# Patient Record
Sex: MALE | Race: WHITE | NOT HISPANIC OR LATINO | Employment: OTHER | URBAN - METROPOLITAN AREA
[De-identification: names, ages, dates, MRNs, and addresses within clinical notes are randomized per-mention and may not be internally consistent; named-entity substitution may affect disease eponyms.]

---

## 2017-01-04 DIAGNOSIS — I48.91 ATRIAL FIBRILLATION AND FLUTTER (H): ICD-10-CM

## 2017-01-04 DIAGNOSIS — G25.81 RESTLESS LEGS SYNDROME (RLS): Primary | ICD-10-CM

## 2017-01-04 DIAGNOSIS — I48.92 ATRIAL FIBRILLATION AND FLUTTER (H): ICD-10-CM

## 2017-01-09 ENCOUNTER — TRANSFERRED RECORDS (OUTPATIENT)
Dept: HEALTH INFORMATION MANAGEMENT | Facility: CLINIC | Age: 72
End: 2017-01-09

## 2017-02-07 DIAGNOSIS — G25.81 RESTLESS LEGS SYNDROME (RLS): Primary | ICD-10-CM

## 2017-02-07 RX ORDER — GABAPENTIN 300 MG/1
600 CAPSULE ORAL EVERY EVENING
Qty: 60 CAPSULE | Refills: 5 | Status: SHIPPED | OUTPATIENT
Start: 2017-02-07 | End: 2017-09-13

## 2017-02-10 ENCOUNTER — OFFICE VISIT (OUTPATIENT)
Dept: SLEEP MEDICINE | Facility: CLINIC | Age: 72
End: 2017-02-10
Payer: MEDICARE

## 2017-02-10 VITALS
HEART RATE: 61 BPM | BODY MASS INDEX: 27.55 KG/M2 | DIASTOLIC BLOOD PRESSURE: 54 MMHG | SYSTOLIC BLOOD PRESSURE: 110 MMHG | TEMPERATURE: 97.5 F | WEIGHT: 186 LBS | HEIGHT: 69 IN | OXYGEN SATURATION: 93 %

## 2017-02-10 DIAGNOSIS — G47.33 OSA (OBSTRUCTIVE SLEEP APNEA): Primary | ICD-10-CM

## 2017-02-10 PROCEDURE — 99214 OFFICE O/P EST MOD 30 MIN: CPT | Performed by: INTERNAL MEDICINE

## 2017-02-10 NOTE — PATIENT INSTRUCTIONS

## 2017-02-10 NOTE — MR AVS SNAPSHOT
After Visit Summary   2/10/2017    Efrem Ramos    MRN: [id number]           Patient Information     Date Of Birth          1945        Visit Information        Provider Department      2/10/2017 11:30 AM Ron Pacheco MD [facility] [place]        Today's Diagnoses     MEL (obstructive sleep apnea)    -  1      Care Instructions      Your BMI is Body mass index is 27.87 kg/(m^2).  Weight management is a personal decision.  If you are interested in exploring weight loss strategies, the following discussion covers the approaches that may be successful. Body mass index (BMI) is one way to tell whether you are at a healthy weight, overweight, or obese. It measures your weight in relation to your height.  A BMI of 18.5 to 24.9 is in the healthy range. A person with a BMI of 25 to 29.9 is considered overweight, and someone with a BMI of 30 or greater is considered obese. More than two-thirds of American adults are considered overweight or obese.  Being overweight or obese increases the risk for further weight gain. Excess weight may lead to heart disease and diabetes.  Creating and following plans for healthy eating and physical activity may help you improve your health.  Weight control is part of healthy lifestyle and includes exercise, emotional health, and healthy eating habits. Careful eating habits lifelong are the mainstay of weight control. Though there are significant health benefits from weight loss, long-term weight loss with diet alone may be very difficult to achieve- studies show long-term success with dietary management in less than 10% of people. Attaining a healthy weight may be especially difficult to achieve in those with severe obesity. In some cases, medications, devices and surgical management might be considered.  What can you do?  If you are overweight or obese and are interested in methods for weight loss, you should discuss this with your provider.     Consider  reducing daily calorie intake by 500 calories.     Keep a food journal.     Avoiding skipping meals, consider cutting portions instead.    Diet combined with exercise helps maintain muscle while optimizing fat loss. Strength training is particularly important for building and maintaining muscle mass. Exercise helps reduce stress, increase energy, and improves fitness. Increasing exercise without diet control, however, may not burn enough calories to loose weight.       Start walking three days a week 10-20 minutes at a time    Work towards walking thirty minutes five days a week     Eventually, increase the speed of your walking for 1-2 minutes at time    In addition, we recommend that you review healthy lifestyles and methods for weight loss available through the National Institutes of Health patient information sites:  http://win.niddk.nih.gov/publications/index.htm    And look into health and wellness programs that may be available through your health insurance provider, employer, local community center, or anna club.    Weight management plan: Patient was referred to their PCP to discuss a diet and exercise plan.            Follow-ups after your visit        Follow-up notes from your care team     Return in about 3 months (around 5/10/2017).      Your next 10 appointments already scheduled     May 23, 2017  1:00 PM CDT   Level O with [facility] 2   [facility] ([facility])    [facility]  [address]  [facility] [address]   [phone]            May 23, 2017  2:00 PM CDT   Return Sleep Patient with Ron Pacheco MD   [facility] ([facility])    [address]  [facility] [address]   [phone]            May 31, 2017  1:00 PM CDT   Return Visit with Shae Aldana MD   [facility] ([facility])    [facility]  [address]  [place] [address] [phone]              Who to contact     If you have questions or need follow up information about today's clinic visit or your schedule please contact [facility] directly at [phone].  Normal or non-critical lab and imaging results will be communicated to you by MyChart, letter or phone within 4 business days after the clinic has received the results. If you do not hear from us within 7 days, please contact the clinic through [employer] or phone. If you have a critical or abnormal lab result, we will notify you by phone as soon as possible.  Submit refill requests through [employer] or call your pharmacy and they will forward the refill request to us. Please allow 3 business days for your refill to be completed.          Additional Information About Your Visit        [employer] Information     [employer] gives you secure access to your electronic health record. If you see a primary care provider, you can also send messages to your care team and make appointments. If you have questions, please call your primary care clinic.  If you do not have a primary care provider, please call [phone] and they will assist you.        Care EveryWhere ID     This is your Care EveryWhere ID. This could be used by other organizations to access your [place] medical records  [id number]        Your Vitals Were     Pulse Temperature Height Pulse Oximetry BMI (Body Mass Index)       61 97.5  F (36.4  C) (Oral) 1.74 m (5' 8.5\") 93% 27.87 kg/m2        Blood Pressure from Last 3 Encounters:   02/10/17 110/54   12/14/16 112/66   11/30/16 112/72    Weight from Last 3 Encounters:   02/10/17 84.4 kg (186 lb)   12/14/16 82.2 kg (181 lb 3.2 oz)   11/30/16 83.9 kg (185 lb)              We Performed the Following     Comprehensive DME        Primary Care Provider Office Phone # Fax #    Danny Paige [phone]       [facility] [address]  [facility] [address]        Thank you!     Thank you for choosing [facility]  for your care. Our goal is always to provide you with excellent care. Hearing back from our patients is one way we can continue to improve our services. Please take a few minutes to complete the written survey that you may receive in the mail after your visit with us. Thank you!             Your Updated Medication List - Protect others around you: Learn how to safely use, store and throw away your medicines at www.disposemymeds.org.          This list is accurate as of: 2/10/17 11:59 PM.  Always use your most recent med list.                   Brand Name Dispense Instructions for use    acetaminophen 500 MG tablet    TYLENOL     Take 1,000 mg by mouth every 6 hours as needed for mild pain       apixaban ANTICOAGULANT 5 MG tablet    ELIQUIS    180 tablet    Take 1 tablet (5 mg) by mouth 2 times daily       ASPIRIN PO      Take 81 mg by mouth every morning       B-12 2000 MCG Tabs      Take 1 tablet by mouth every morning       digoxin 125 MCG tablet    LANOXIN    30 tablet    Take 1 tablet (125 mcg) by mouth daily       gabapentin 300 MG capsule    NEURONTIN    60 capsule    Take 2 capsules (600 mg) by mouth every evening       metoprolol 25 MG tablet    LOPRESSOR    30 tablet    Take 0.5 tablets (12.5 mg) by mouth 2 times daily       MIRALAX powder   Generic drug:  polyethylene glycol      Take 17 g by mouth daily       multivitamin Tabs tablet      Take 1 tablet by mouth every morning       predniSONE 10 MG tablet    DELTASONE     Take 5 mg by mouth daily       simvastatin 40 MG tablet    ZOCOR    90 tablet    Take 1 tablet (40 mg) by mouth At Bedtime

## 2017-02-10 NOTE — NURSING NOTE
"Chief Complaint   Patient presents with     Sleep Problem     medication follow up for RLS, discuss restart of cpap machine       Initial /54 mmHg  Pulse 61  Temp(Src) 97.5  F (36.4  C) (Oral)  Ht 1.74 m (5' 8.5\")  Wt 84.369 kg (186 lb)  BMI 27.87 kg/m2  SpO2 93% Estimated body mass index is 27.87 kg/(m^2) as calculated from the following:    Height as of this encounter: 1.74 m (5' 8.5\").    Weight as of this encounter: 84.369 kg (186 lb).  Medication Reconciliation: complete   ESS 7/24  Yudy Felix MA      "

## 2017-02-11 NOTE — PROGRESS NOTES
PROGRESS NOTE       DATE OF SERVICE:  02/10/2017.       CHIEF COMPLAINT:  Follow up for obstructive sleep apnea and restless legs syndrome.      HISTORY OF PRESENT ILLNESS:  Mr. Efrem Ramos was accompanied by his wife.  He was previously diagnosed with severe obstructive sleep apnea.  Polysomnogram from 07/20/2015 showed an apnea-hypopnea index of 43.4 per hour and RDI of 58.8 per hour.  Lowest oxygen saturation was 87.2%.  The patient had a PLM index of 40 per hour with a PLM arousal index of 8 per hour.  The patient has subjective complaints of restless legs syndrome.      He was started on auto-PAP therapy with a pressure range of 5-10 cm of water.  He had a good response initially to CPAP.  He had demonstrated good compliance on his followup on 10/21/2015.  However, patient had a break in therapy last year.  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  He cannot use CPAP any more due to recurrent cough.  He subsequently discontinued CPAP and has some anxiety about restarting it.  He currently uses a full face mask and wonders if a nasal mask interface may be better tolerated.      He was started on gabapentin at a dose of 600 mg for managing his restless legs and excessive periodic limb movements of sleep.  The patient has responded well to gabapentin, which he has continued regularly.      Today, I discussed restarting CPAP therapy for his sleep apnea.  CPAP desensitization techniques were discussed in detail.  We also discussed changing his mask interface to see if he can tolerate therapy better.  I have advised a mask fitting session with a DME and restarting CPAP in the autotitration range at 5-10 cm of water.            Medications:     Current Outpatient Prescriptions   Medication Sig     gabapentin (NEURONTIN) 300 MG capsule Take 2 capsules (600 mg) by mouth every evening     apixaban ANTICOAGULANT (ELIQUIS) 5 MG tablet Take 1 tablet (5 mg) by mouth 2 times daily      predniSONE (DELTASONE) 10 MG tablet Take 5 mg by mouth daily      polyethylene glycol (MIRALAX) powder Take 17 g by mouth daily     metoprolol (LOPRESSOR) 25 MG tablet Take 0.5 tablets (12.5 mg) by mouth 2 times daily     acetaminophen (TYLENOL) 500 MG tablet Take 1,000 mg by mouth every 6 hours as needed for mild pain     simvastatin (ZOCOR) 40 MG tablet Take 1 tablet (40 mg) by mouth At Bedtime     digoxin (LANOXIN) 125 MCG tablet Take 1 tablet (125 mcg) by mouth daily     ASPIRIN PO Take 81 mg by mouth every morning      Cyanocobalamin (B-12) 2000 MCG TABS Take 1 tablet by mouth every morning      multivitamin (OCUVITE) TABS Take 1 tablet by mouth every morning     No current facility-administered medications for this visit.         Allergies   Allergen Reactions     Adhesive Tape Blisters     Lasix [Furosemide] Other (See Comments)     Throat swelling, wheezing     Penicillins Unknown     Sulfa Drugs Difficulty breathing            Past Medical History:     Does not need 02 supplement at night   Past Medical History   Diagnosis Date     Atrial fibrillation (H)      Cancer (H) vocal cord     Carpal tunnel syndrome      abstracted 7/3/02.     CVA (cerebral infarction) 5/5/2015     Demyelinating disease of central nervous system, unspecified (H)      abstracted 7/3/02.     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     Other and unspecified noninfectious gastroenteritis and colitis(558.9)      abstracted 7/3/02.     Pneumonia 8/17/2016     Redundant colon      needs CT colonography     Shingles      SKIN DISORDERS NEC 3/15/2005     Sleep apnea              Past Surgical History:    No h/o  upper airway surgery  Past Surgical History   Procedure Laterality Date     C nonspecific procedure  as a child     T & A. abstracted 7/3/02.     C nonspecific  "procedure  early     CTR. abstracted 7/3/02.     Orthopedic surgery       Head & neck surgery       Biopsy  brain 2002     Thoracoscopic wedge resection lung Right 2016     Procedure: THORACOSCOPIC WEDGE RESECTION LUNG;  Surgeon: Abdelrahman Noriega MD;  Location:  OR            Physical Examination:   /54  Pulse 61  Temp 97.5  F (36.4  C) (Oral)  Ht 1.74 m (5' 8.5\")  Wt 84.4 kg (186 lb)  SpO2 93%  BMI 27.87 kg/m2     DIAGNOSES:     1.  Severe obstructive sleep apnea.   2.  Restless leg syndrome.     Patient has severe obstructive sleep apnea but has been unable to use CPAP due to intolerance of mask. A mask refitting has been requested through DME. He was counseled in detail regarding consequences of untreated severe sleep apnea. He was also counseled regarding CPAP desensitization techniques.      TREATMENT PLAN:   1.  Continue auto PAP therapy.    2.  Mask fitting through DME.   3.  Continue gabapentin at 600 mg daily.   4.  Follow up in a month.      I spent a total of 25 minutes with this patient, with more than 50% spent in counseling regarding sleep apnea.         MEGAN HERNANDEZ MD             D: 02/10/2017 16:53   T: 02/10/2017 18:18   MT: SUSHILA      Name:     PHANI AVITIA   MRN:      [id number]        Account:      [id number]   :      1945           Visit Date:   02/10/2017      Document: [id number]       cc: Danny Paige MD   "

## 2017-03-01 ENCOUNTER — TELEPHONE (OUTPATIENT)
Dept: SLEEP MEDICINE | Facility: CLINIC | Age: 72
End: 2017-03-01

## 2017-03-09 ENCOUNTER — ALLIED HEALTH/NURSE VISIT (OUTPATIENT)
Dept: FAMILY MEDICINE | Facility: CLINIC | Age: 72
End: 2017-03-09
Payer: MEDICARE

## 2017-03-09 VITALS — DIASTOLIC BLOOD PRESSURE: 68 MMHG | SYSTOLIC BLOOD PRESSURE: 110 MMHG

## 2017-03-09 DIAGNOSIS — Z01.30 BP CHECK: Primary | ICD-10-CM

## 2017-03-09 PROCEDURE — 99207 ZZC NO CHARGE NURSE ONLY: CPT | Performed by: INTERNAL MEDICINE

## 2017-03-09 NOTE — MR AVS SNAPSHOT
After Visit Summary   3/9/2017    Efrem Ramos    MRN: [id number]           Patient Information     Date Of Birth          1945        Visit Information        Provider Department      3/9/2017 2:04 PM Danny Paige MD [facility]        Today's Diagnoses     BP check    -  1       Follow-ups after your visit        Your next 10 appointments already scheduled     May 23, 2017  1:00 PM CDT   Level O with  INFUSION CHAIR 2   [facility] ([facility])    [facility]  [address]  [facility] [address]   [phone]            May 23, 2017  2:00 PM CDT   Return Sleep Patient with Ron Pacheco MD   [facility] ([facility])    [address]  [facility] [address]   [phone]            May 31, 2017  1:00 PM CDT   Return Visit with Shae Aldana MD   [facility] ([facility])    [facility]  [address]  [facility] [address]   [phone]              Who to contact     If you have questions or need follow up information about today's clinic visit or your schedule please contact [facility] directly at [phone].  Normal or non-critical lab and imaging results will be communicated to you by MyChart, letter or phone within 4 business days after the clinic has received the results. If you do not hear from us within 7 days, please contact the clinic through [employer] or phone. If you have a critical or abnormal lab result, we will notify you by phone as soon as possible.  Submit refill requests through [employer] or call your pharmacy and they will forward the refill request to us. Please allow 3 business days for your refill to be completed.          Additional Information About Your Visit        [employer] Information     [employer] gives you secure access to your electronic health  record. If you see a primary care provider, you can also send messages to your care team and make appointments. If you have questions, please call your primary care clinic.  If you do not have a primary care provider, please call [phone] and they will assist you.        Care EveryWhere ID     This is your Care EveryWhere ID. This could be used by other organizations to access your [place] medical records  [id number]         Blood Pressure from Last 3 Encounters:   03/09/17 110/68   02/10/17 110/54   12/14/16 112/66    Weight from Last 3 Encounters:   02/10/17 186 lb (84.4 kg)   12/14/16 181 lb 3.2 oz (82.2 kg)   11/30/16 185 lb (83.9 kg)              Today, you had the following     No orders found for display       Primary Care Provider Office Phone # Fax #    Danny Paige [phone]       [facility] [address]  [facility] [address]        Thank you!     Thank you for choosing [facility]  for your care. Our goal is always to provide you with excellent care. Hearing back from our patients is one way we can continue to improve our services. Please take a few minutes to complete the written survey that you may receive in the mail after your visit with us. Thank you!             Your Updated Medication List - Protect others around you: Learn how to safely use, store and throw away your medicines at www.disposemymeds.org.          This list is accurate as of: 3/9/17  2:06 PM.  Always use your most recent med list.                   Brand Name Dispense Instructions for use    acetaminophen 500 MG tablet    TYLENOL     Take 1,000 mg by mouth every 6 hours as needed for mild pain       apixaban ANTICOAGULANT 5 MG tablet    ELIQUIS    180 tablet    Take 1 tablet (5 mg) by mouth 2 times daily       ASPIRIN PO      Take 81 mg by mouth every morning       B-12 2000 MCG Tabs      Take 1 tablet by mouth every morning       digoxin 125 MCG tablet    LANOXIN     30 tablet    Take 1 tablet (125 mcg) by mouth daily       gabapentin 300 MG capsule    NEURONTIN    60 capsule    Take 2 capsules (600 mg) by mouth every evening       metoprolol 25 MG tablet    LOPRESSOR    30 tablet    Take 0.5 tablets (12.5 mg) by mouth 2 times daily       MIRALAX powder   Generic drug:  polyethylene glycol      Take 17 g by mouth daily       multivitamin Tabs tablet      Take 1 tablet by mouth every morning       predniSONE 10 MG tablet    DELTASONE     Take 5 mg by mouth daily       simvastatin 40 MG tablet    ZOCOR    90 tablet    Take 1 tablet (40 mg) by mouth At Bedtime

## 2017-03-09 NOTE — PROGRESS NOTES
Efrem Ramos is enrolled/participating in the retail pharmacy Blood Pressure Goals Achievement Program (BPGAP).  Efrem Ramos was evaluated at [facility] on March 9, 2017 at which time his blood pressure was:    BP Readings from Last 3 Encounters:   03/09/17 110/68   02/10/17 110/54   12/14/16 112/66     Reviewed lifestyle modifications for blood pressure control and reduction: including making healthy food choices, managing weight, getting regular exercise, smoking cessation, reducing alcohol consumption, monitoring blood pressure regularly.     Efrem Ramos is not experiencing symptoms.    Follow-Up: BP is at goal of < 150/90 mmHg (patient 60+ years of age without diabetes).  Recommended follow-up at pharmacy in 6 months.     Completed by: Analisa Le, PharmD, [facility]     [facility]  [phone]

## 2017-04-10 ENCOUNTER — TRANSFERRED RECORDS (OUTPATIENT)
Dept: HEALTH INFORMATION MANAGEMENT | Facility: CLINIC | Age: 72
End: 2017-04-10

## 2017-05-23 ENCOUNTER — HOSPITAL ENCOUNTER (OUTPATIENT)
Facility: CLINIC | Age: 72
Setting detail: SPECIMEN
Discharge: HOME | End: 2017-05-23
Attending: INTERNAL MEDICINE | Admitting: INTERNAL MEDICINE
Payer: MEDICARE

## 2017-05-23 ENCOUNTER — ONCOLOGY VISIT (OUTPATIENT)
Dept: ONCOLOGY | Facility: CLINIC | Age: 72
End: 2017-05-23
Attending: INTERNAL MEDICINE
Payer: MEDICARE

## 2017-05-23 DIAGNOSIS — D47.2 MGUS (MONOCLONAL GAMMOPATHY OF UNKNOWN SIGNIFICANCE): ICD-10-CM

## 2017-05-23 LAB
ALBUMIN SERPL-MCNC: 3.4 G/DL (ref 3.4–5)
ALP SERPL-CCNC: 90 U/L (ref 40–150)
ALT SERPL W P-5'-P-CCNC: 14 U/L (ref 0–70)
ANION GAP SERPL CALCULATED.3IONS-SCNC: 11 MMOL/L (ref 3–14)
AST SERPL W P-5'-P-CCNC: 12 U/L (ref 0–45)
BASOPHILS # BLD AUTO: 0.1 10E9/L (ref 0–0.2)
BASOPHILS NFR BLD AUTO: 0.6 %
BILIRUB SERPL-MCNC: 0.9 MG/DL (ref 0.2–1.3)
BUN SERPL-MCNC: 7 MG/DL (ref 7–30)
CALCIUM SERPL-MCNC: 8.7 MG/DL (ref 8.5–10.1)
CHLORIDE SERPL-SCNC: 107 MMOL/L (ref 94–109)
CO2 SERPL-SCNC: 22 MMOL/L (ref 20–32)
CREAT SERPL-MCNC: 0.82 MG/DL (ref 0.66–1.25)
DIFFERENTIAL METHOD BLD: ABNORMAL
EOSINOPHIL # BLD AUTO: 0.2 10E9/L (ref 0–0.7)
EOSINOPHIL NFR BLD AUTO: 1.7 %
ERYTHROCYTE [DISTWIDTH] IN BLOOD BY AUTOMATED COUNT: 14.3 % (ref 10–15)
GFR SERPL CREATININE-BSD FRML MDRD: NORMAL ML/MIN/1.7M2
GLUCOSE SERPL-MCNC: 94 MG/DL (ref 70–99)
HCT VFR BLD AUTO: 39.6 % (ref 40–53)
HGB BLD-MCNC: 13.5 G/DL (ref 13.3–17.7)
IMM GRANULOCYTES # BLD: 0 10E9/L (ref 0–0.4)
IMM GRANULOCYTES NFR BLD: 0.4 %
LYMPHOCYTES # BLD AUTO: 1.8 10E9/L (ref 0.8–5.3)
LYMPHOCYTES NFR BLD AUTO: 17.3 %
MCH RBC QN AUTO: 32.7 PG (ref 26.5–33)
MCHC RBC AUTO-ENTMCNC: 34.1 G/DL (ref 31.5–36.5)
MCV RBC AUTO: 96 FL (ref 78–100)
MONOCYTES # BLD AUTO: 1 10E9/L (ref 0–1.3)
MONOCYTES NFR BLD AUTO: 9.6 %
NEUTROPHILS # BLD AUTO: 7.4 10E9/L (ref 1.6–8.3)
NEUTROPHILS NFR BLD AUTO: 70.4 %
NRBC # BLD AUTO: 0 10*3/UL
NRBC BLD AUTO-RTO: 0 /100
PLATELET # BLD AUTO: 296 10E9/L (ref 150–450)
POTASSIUM SERPL-SCNC: 3.5 MMOL/L (ref 3.4–5.3)
PROT SERPL-MCNC: 7.3 G/DL (ref 6.8–8.8)
RBC # BLD AUTO: 4.13 10E12/L (ref 4.4–5.9)
SODIUM SERPL-SCNC: 140 MMOL/L (ref 133–144)
WBC # BLD AUTO: 10.6 10E9/L (ref 4–11)

## 2017-05-23 PROCEDURE — 80053 COMPREHEN METABOLIC PANEL: CPT | Performed by: INTERNAL MEDICINE

## 2017-05-23 PROCEDURE — 00000402 ZZHCL STATISTIC TOTAL PROTEIN: Performed by: INTERNAL MEDICINE

## 2017-05-23 PROCEDURE — 36415 COLL VENOUS BLD VENIPUNCTURE: CPT

## 2017-05-23 PROCEDURE — 82784 ASSAY IGA/IGD/IGG/IGM EACH: CPT | Performed by: INTERNAL MEDICINE

## 2017-05-23 PROCEDURE — 84165 PROTEIN E-PHORESIS SERUM: CPT | Performed by: INTERNAL MEDICINE

## 2017-05-23 PROCEDURE — 86334 IMMUNOFIX E-PHORESIS SERUM: CPT | Performed by: INTERNAL MEDICINE

## 2017-05-23 PROCEDURE — 83883 ASSAY NEPHELOMETRY NOT SPEC: CPT | Performed by: INTERNAL MEDICINE

## 2017-05-23 PROCEDURE — 85025 COMPLETE CBC W/AUTO DIFF WBC: CPT | Performed by: INTERNAL MEDICINE

## 2017-05-23 NOTE — MR AVS SNAPSHOT
After Visit Summary   5/23/2017    Efrem Ramos    MRN: [id number]           Patient Information     Date Of Birth          1945        Visit Information        Provider Department      5/23/2017 1:00 PM Nurse, Tram Oncology [facility]        Today's Diagnoses     MGUS (monoclonal gammopathy of unknown significance)           Follow-ups after your visit        Your next 10 appointments already scheduled     May 31, 2017  1:00 PM CDT   Return Visit with Shae Aldana MD   [facility] ([facility])    [facility]  [address]  [facility] [address] [phone]            Ferdinand 15, 2017  1:40 PM CDT   LAB with DANIELLE LAB   [facility] PHYSICIANS HEART AT [place] ([facility])    [address]  [facility] [address] [phone]           Patient must bring picture ID.  Patient should be prepared to give a urine specimen  Please do not eat 10-12 hours before your appointment if you are coming in fasting for labs on lipids, cholesterol, or glucose (sugar).  Pregnant women should follow their Care Team instructions. Water with medications is okay. Do not drink coffee or other fluids.   If you have concerns about taking  your medications, please ask at office or if scheduling via S[employer]ed [employer], send a message by clicking on Secure Messaging, Message Your Care Team.            Ferdinand 15, 2017  2:00 PM CDT   Ech Complete with SHCVECHR2   [facility] CV Echocardiography (Cardiovascular Imaging at [facility])    [address]  [facility] [address] [phone]           1.  Please bring or wear a comfortable two-piece outfit. 2.  You may eat, drink and take your normal medicines. 3.  For any questions that cannot be answered, please contact the ordering physician            Jun 16, 2017 12:45 PM CDT   Return Visit with Bella Chavis MD   [facility]  PHYSICIANS HEART AT [place] ([facility])    [address]  [place] [address] [phone]              Who to contact     If you have questions or need follow up information about today's clinic visit or your schedule please contact [facility] directly at [phone].  Normal or non-critical lab and imaging results will be communicated to you by MyChart, letter or phone within 4 business days after the clinic has received the results. If you do not hear from us within 7 days, please contact the clinic through Addeparhart or phone. If you have a critical or abnormal lab result, we will notify you by phone as soon as possible.  Submit refill requests through [employer] or call your pharmacy and they will forward the refill request to us. Please allow 3 business days for your refill to be completed.          Additional Information About Your Visit        Addeparhart Information     [employer] gives you secure access to your electronic health record. If you see a primary care provider, you can also send messages to your care team and make appointments. If you have questions, please call your primary care clinic.  If you do not have a primary care provider, please call [phone] and they will assist you.        Care EveryWhere ID     This is your Care EveryWhere ID. This could be used by other organizations to access your [place] medical records  [id number]         Blood Pressure from Last 3 Encounters:   03/09/17 110/68   02/10/17 110/54   12/14/16 112/66    Weight from Last 3 Encounters:   02/10/17 84.4 kg (186 lb)   12/14/16 82.2 kg (181 lb 3.2 oz)   11/30/16 83.9 kg (185 lb)              We Performed the Following     CBC with platelets differential     Comprehensive metabolic panel     Kappa and lambda light chain     Protein electrophoresis     Protein Immunofixation Serum        Primary Care Provider Office Phone # Fax #    Danny Paige [phone]        [facility] [address] [facility] [address]  [facility] [address]        Thank you!     Thank you for choosing [facility]  for your care. Our goal is always to provide you with excellent care. Hearing back from our patients is one way we can continue to improve our services. Please take a few minutes to complete the written survey that you may receive in the mail after your visit with us. Thank you!             Your Updated Medication List - Protect others around you: Learn how to safely use, store and throw away your medicines at www.disposemymeds.org.          This list is accurate as of: 5/23/17  1:15 PM.  Always use your most recent med list.                   Brand Name Dispense Instructions for use    acetaminophen 500 MG tablet    TYLENOL     Take 1,000 mg by mouth every 6 hours as needed for mild pain       apixaban ANTICOAGULANT 5 MG tablet    ELIQUIS    180 tablet    Take 1 tablet (5 mg) by mouth 2 times daily       ASPIRIN PO      Take 81 mg by mouth every morning       B-12 2000 MCG Tabs      Take 1 tablet by mouth every morning       digoxin 125 MCG tablet    LANOXIN    30 tablet    Take 1 tablet (125 mcg) by mouth daily       gabapentin 300 MG capsule    NEURONTIN    60 capsule    Take 2 capsules (600 mg) by mouth every evening       metoprolol 25 MG tablet    LOPRESSOR    30 tablet    Take 0.5 tablets (12.5 mg) by mouth 2 times daily       MIRALAX powder   Generic drug:  polyethylene glycol      Take 17 g by mouth daily       multivitamin Tabs tablet      Take 1 tablet by mouth every morning       predniSONE 10 MG tablet    DELTASONE     Take 5 mg by mouth daily       simvastatin 40 MG tablet    ZOCOR    90 tablet    Take 1 tablet (40 mg) by mouth At Bedtime

## 2017-05-23 NOTE — PROGRESS NOTES
Medical Assistant Note:  Efrem Ramos presents today for lab visit.    Patient seen by provider today: No.   present during visit today: Not Applicable.    Concerns: No Concerns.    Procedure:  Lab draw site: LAC, Needle type: BF, Gauge: 21 g gauze and coban applied.    Post Assessment:  Labs drawn without difficulty: Yes.    Discharge Plan:  Departure Mode: Ambulatory.    Face to Face Time: 5.    Dotty Batista MA

## 2017-05-24 LAB
ALBUMIN SERPL ELPH-MCNC: 3.6 G/DL (ref 3.7–5.1)
ALPHA1 GLOB SERPL ELPH-MCNC: 0.4 G/DL (ref 0.2–0.4)
ALPHA2 GLOB SERPL ELPH-MCNC: 0.8 G/DL (ref 0.5–0.9)
B-GLOBULIN SERPL ELPH-MCNC: 0.8 G/DL (ref 0.6–1)
GAMMA GLOB SERPL ELPH-MCNC: 1.5 G/DL (ref 0.7–1.6)
IGA SERPL-MCNC: 328 MG/DL (ref 70–380)
IGG SERPL-MCNC: 1370 MG/DL (ref 695–1620)
IGM SERPL-MCNC: 193 MG/DL (ref 60–265)
IMMUNOFIXATION ELP: NORMAL
KAPPA LC UR-MCNC: 5.51 MG/DL (ref 0.33–1.94)
KAPPA LC/LAMBDA SER: 1.69 {RATIO} (ref 0.26–1.65)
LAMBDA LC SERPL-MCNC: 3.26 MG/DL (ref 0.57–2.63)
M PROTEIN SERPL ELPH-MCNC: 0.4 G/DL
PROT PATTERN SERPL ELPH-IMP: ABNORMAL

## 2017-05-31 ENCOUNTER — ONCOLOGY VISIT (OUTPATIENT)
Dept: ONCOLOGY | Facility: CLINIC | Age: 72
End: 2017-05-31
Attending: INTERNAL MEDICINE
Payer: MEDICARE

## 2017-05-31 ENCOUNTER — HOSPITAL ENCOUNTER (OUTPATIENT)
Facility: CLINIC | Age: 72
Setting detail: SPECIMEN
Discharge: HOME | End: 2017-05-31
Attending: INTERNAL MEDICINE | Admitting: INTERNAL MEDICINE
Payer: MEDICARE

## 2017-05-31 VITALS
WEIGHT: 182.8 LBS | HEART RATE: 64 BPM | SYSTOLIC BLOOD PRESSURE: 98 MMHG | TEMPERATURE: 98.3 F | RESPIRATION RATE: 20 BRPM | OXYGEN SATURATION: 97 % | DIASTOLIC BLOOD PRESSURE: 61 MMHG | BODY MASS INDEX: 27.39 KG/M2

## 2017-05-31 DIAGNOSIS — D47.2 MGUS (MONOCLONAL GAMMOPATHY OF UNKNOWN SIGNIFICANCE): Primary | ICD-10-CM

## 2017-05-31 PROCEDURE — 99211 OFF/OP EST MAY X REQ PHY/QHP: CPT

## 2017-05-31 PROCEDURE — 36415 COLL VENOUS BLD VENIPUNCTURE: CPT

## 2017-05-31 PROCEDURE — 99214 OFFICE O/P EST MOD 30 MIN: CPT | Performed by: INTERNAL MEDICINE

## 2017-05-31 PROCEDURE — 82607 VITAMIN B-12: CPT | Performed by: INTERNAL MEDICINE

## 2017-05-31 ASSESSMENT — PAIN SCALES - GENERAL: PAINLEVEL: NO PAIN (0)

## 2017-05-31 NOTE — MR AVS SNAPSHOT
After Visit Summary   5/31/2017    Efrem Ramos    MRN: [id number]           Patient Information     Date Of Birth          1945        Visit Information        Provider Department      5/31/2017 1:00 PM Shae Aldana MD [facility]        Today's Diagnoses     MGUS (monoclonal gammopathy of unknown significance)    -  1      Care Instructions    1. Labs before next visit- CBC diff, CMP, IFXN, SPEP, light chain, B12  2.  RTC MD 4 months  3- B12 level today  4- Change vitamin B12 2000 mcg once a week  5- Schedule an appointment with Dr. Paige for chronic diarrhea  6- Schedule a bone marrow biopsy          Follow-ups after your visit        Your next 10 appointments already scheduled     Jun 08, 2017   Procedure with Jamie Gonzales MD   [facility] ([facility])    [address] [place] [address] [phone]           [facility] is located at [address] [place] [address]            Ferdinand 15, 2017  1:40 PM CDT   LAB with DANIELLE LAB   [facility] PHYSICIANS HEART AT [place] (P PSA Clinics)    [address]  [facility] [address] [phone]           Patient must bring picture ID.  Patient should be prepared to give a urine specimen  Please do not eat 10-12 hours before your appointment if you are coming in fasting for labs on lipids, cholesterol, or glucose (sugar).  Pregnant women should follow their Care Team instructions. Water with medications is okay. Do not drink coffee or other fluids.   If you have concerns about taking  your medications, please ask at office or if scheduling via [employer], send a message by clicking on Secure Messaging, Message Your Care Team.            Ferdinand 15, 2017  2:00 PM CDT   Ech Complete with SHCVECHR2   [facility] CV Echocardiography (Cardiovascular Imaging at [facility])    [address]  [facility] [address]    [phone]           1.  Please bring or wear a comfortable two-piece outfit. 2.  You may eat, drink and take your normal medicines. 3.  For any questions that cannot be answered, please contact the ordering physician            Jun 16, 2017 12:45 PM CDT   Return Visit with Bella Chavis MD   [facility] PHYSICIANS [facility] AT [place] ([facility] Clin[facility])    [address]  [facility] [address]   [phone]            Sep 20, 2017  1:00 PM CDT   Return Visit with  Oncology Nurse   [facility] ([facility])    [facility]  [address]  [facility] [address] [phone]            Sep 27, 2017  1:00 PM CDT   Return Visit with Shae Aldana MD   [facility] ([facility])    [facility]  [address]  [facility] [address] [phone]              Who to contact     If you have questions or need follow up information about today's clinic visit or your schedule please contact [facility] directly at [phone].  Normal or non-critical lab and imaging results will be communicated to you by Ambient [employer] Systemshart, letter or phone within 4 business days after the clinic has received the results. If you do not hear from us within 7 days, please contact the clinic through EzyInsightst or phone. If you have a critical or abnormal lab result, we will notify you by phone as soon as possible.  Submit refill requests through [employer] or call your pharmacy and they will forward the refill request to us. Please allow 3 business days for your refill to be completed.          Additional Information About Your Visit        [employer] Information     [employer] gives you secure access to your electronic health record. If you see a primary care provider, you can also send messages to your care team and make appointments. If you have questions, please call your primary care clinic.  If  you do not have a primary care provider, please call [phone] and they will assist you.        Care EveryWhere ID     This is your Care EveryWhere ID. This could be used by other organizations to access your [place] medical records  [id number]        Your Vitals Were     Pulse Temperature Respirations Pulse Oximetry BMI (Body Mass Index)       64 98.3  F (36.8  C) (Oral) 20 97% 27.39 kg/m2        Blood Pressure from Last 3 Encounters:   No data found for BP    Weight from Last 3 Encounters:   No data found for Wt              Today, you had the following     No orders found for display         Today's Medication Changes          These changes are accurate as of: 5/31/17 11:59 PM.  If you have any questions, ask your nurse or doctor.               Stop taking these medicines if you haven't already. Please contact your care team if you have questions.     predniSONE 10 MG tablet   Commonly known as:  DELTASONE                    Primary Care Provider Office Phone # Fax #    Danny Paige [phone]       [facility] [address]  [facility] [address]        Thank you!     Thank you for choosing [facility]  for your care. Our goal is always to provide you with excellent care. Hearing back from our patients is one way we can continue to improve our services. Please take a few minutes to complete the written survey that you may receive in the mail after your visit with us. Thank you!             Your Updated Medication List - Protect others around you: Learn how to safely use, store and throw away your medicines at www.disposemymeds.org.          This list is accurate as of: 5/31/17 11:59 PM.  Always use your most recent med list.                   Brand Name Dispense Instructions for use    acetaminophen 500 MG tablet    TYLENOL     Take 1,000 mg by mouth every 6 hours as needed for mild pain       apixaban ANTICOAGULANT 5 MG tablet    ELIQUIS    180  tablet    Take 1 tablet (5 mg) by mouth 2 times daily       ASPIRIN PO      Take 81 mg by mouth every morning       B-12 2000 MCG Tabs      Take 1 tablet by mouth every morning       digoxin 125 MCG tablet    LANOXIN    30 tablet    Take 1 tablet (125 mcg) by mouth daily       gabapentin 300 MG capsule    NEURONTIN    60 capsule    Take 2 capsules (600 mg) by mouth every evening       metoprolol 25 MG tablet    LOPRESSOR    30 tablet    Take 0.5 tablets (12.5 mg) by mouth 2 times daily       MIRALAX powder   Generic drug:  polyethylene glycol      Take 17 g by mouth daily       multivitamin Tabs tablet      Take 1 tablet by mouth every morning       simvastatin 40 MG tablet    ZOCOR    90 tablet    Take 1 tablet (40 mg) by mouth At Bedtime

## 2017-05-31 NOTE — PROGRESS NOTES
"Oncology Rooming Note    May 31, 2017 1:12 PM   Efrem Ramos is a 71 year old male who presents for:    Chief Complaint   Patient presents with     Oncology Clinic Visit     Initial Vitals: BP 98/61 (BP Location: Left arm, Patient Position: Chair, Cuff Size: Adult Regular)  Pulse 64  Temp 98.3  F (36.8  C) (Oral)  Resp 20  Wt 82.9 kg (182 lb 12.8 oz)  SpO2 97%  BMI 27.39 kg/m2 Estimated body mass index is 27.39 kg/(m^2) as calculated from the following:    Height as of 2/10/17: 1.74 m (5' 8.5\").    Weight as of this encounter: 82.9 kg (182 lb 12.8 oz). Body surface area is 2 meters squared.  No Pain (0) Comment: Data Unavailable   No LMP for male patient.  Allergies reviewed: Yes  Medications reviewed: Yes    Medications: Medication refills not needed today.  Pharmacy name entered into Osmopure:    [facility] [address] [place] [address] [address] AT [facility] & [address] [place] [address] PHARMACY [facility] [address]    Clinical concerns: None     5 minutes for nursing intake (face to face time)     Opal Samuel CMA        Medical Assistant Note:  Efrem Ramos presents today for blood draw.    Patient seen by provider today: Yes   present during visit today: Not Applicable.    Concerns: No Concerns.    Procedure:  Lab draw site: LAC, Needle type: BF, Gauge: 21.    Post Assessment:  Labs drawn without difficulty: Yes.    Discharge Plan:  Departure Mode: Ambulatory.    Face to Face Time: 5 minutes.    Opal Samuel MA        DISCHARGE PLAN:  Next appointments: See patient instruction section, blood drawn by Opal.  Departure Mode: Ambulatory  Accompanied by: wife  5 minutes for nursing discharge (face to face time)   Opal Samuel CMA      1. Labs before next visit- CBC diff, CMP, IFXN, SPEP, light chain, B12  2.  RTC MD 4 months  3- B12 level today  4- Change vitamin B12 2000 mcg once a week  5- Schedule an appointment with Dr." Idelkope for chronic diarrhea  6- Schedule a bone marrow biopsy--6-8-17 9/20/2017 Wed  1:00 PM  1:00 P 15 [facility] [[id number]]  ONCOLOGY NURSE [[id number]] RETURN [657] Labs, CBC diff, CMP, IFXN, SPEP, light chain, B12       9/27/2017 Wed  1:00 PM  1:00 P 15 [facility] [[id number]] CASSIDY MAYO [[id number]] RETURN [897] MGUS

## 2017-05-31 NOTE — PROGRESS NOTES
[facility] PHYSICIANS  HEMATOLOGY ONCOLOGY    HEMATOLOGY FOLLOWUP NOTE      DIAGNOSES:   1.  Monoclonal gammopathy of unknown significance.   2.  Anemia.   3.  History of Vocal cord cancer.      TREATMENT:     1.  Efrem Ramos is status post surgery for vocal cord cancer.  He is following up with ENT regularly.   2.  Vitamin B12 1000 mcg intramuscular today.   3.  Vitamin B12 2000 mcg p.o. daily.      SUBJECTIVE:  Patient comes in for followup today. He has been having issues with diarrhea. He otherwise loja not have any other alarming symptoms. No bone pains.      REVIEW OF SYSTEMS:  A complete review of systems was performed and found to be negative other than pertinent positives mentioned in History of Present Illness.     Past medical, social histories reviewed.    Meds- Reviewed.     PHYSICAL EXAMINATION:   VITAL SIGNS:  BP 98/61 (BP Location: Left arm, Patient Position: Chair, Cuff Size: Adult Regular)  Pulse 64  Temp 98.3  F (36.8  C) (Oral)  Resp 20  Wt 82.9 kg (182 lb 12.8 oz)  SpO2 97%  BMI 27.39 kg/m2  GENERAL:  Sitting comfortably.   HEENT:  Pupils equal.  Oral mucosa normal.   NECK:  No supraclavicular or cervical lymphadenopathy.   HEART:  S1, S2, regular.   LUNGS:  Bilaterally clear to auscultation.   GASTROINTESTINAL:  Abdomen is oft, nontender.  No hepatosplenomegaly.   SKIN:  No rash.   EXTREMITIES:  No dependent edema.   NEUROLOGIC:  Alert, awake.      DATA:  Recent Labs   Lab Test  05/23/17   1311  11/21/16   1314   NA  140  141   POTASSIUM  3.5  4.2   CHLORIDE  107  108   CO2  22  27   ANIONGAP  11  6   BUN  7  9   CR  0.82  0.77   GLC  94  87   ULISSES  8.7  8.9     Recent Labs   Lab Test  05/23/17   1311  11/21/16   1314  08/24/16   1539   WBC  10.6  15.2*  8.4   HGB  13.5  12.7*  12.5*   PLT  296  314  293   MCV  96  98  95   NEUTROPHIL  70.4  82.9  81.8     Recent Labs   Lab Test  05/23/17   1311  11/21/16   1314  11/09/16   1246  08/24/16   1539   09/16/13   1350  08/20/13    1509   BILITOTAL  0.9  0.6   --   0.8   < >  0.9  0.7   ALKPHOS  90  66   --   73   < >  90  109   ALT  14  25  <5*  42   < >  28  28   AST  12  15   --   22   < >  30  28   ALBUMIN  3.4  3.1*   --   3.0*   < >  4.1  3.8   LDH   --    --    --    --    --   453  434    < > = values in this interval not displayed.   Results for PHANI AVITIA (MRN [id number]) as of 5/31/2017 13:17   Ref. Range 8/24/2016 15:39 11/21/2016 13:14 5/23/2017 13:11   Gamma Fraction Latest Ref Range: 0.7 - 1.6 g/dL 1.2 1.2 1.5   IGA Latest Ref Range: 70 - 380 mg/dL 351 314 328   IGG Latest Ref Range: 695 - 1620 mg/dL 1150 1030 1370   IGM Latest Ref Range: 60 - 265 mg/dL 172 205 193   Immunofixation ELP Unknown (Note)... (Note)... (Note)...   Kappa Free Lt Chain Latest Ref Range: 0.33 - 1.94 mg/dL 2.90 (H) 2.25 (H) 5.51 (H)   Kappa Lambda Ratio Latest Ref Range: 0.26 - 1.65  1.58 1.30 1.69 (H)   Lambda Free Lt Chain Latest Ref Range: 0.57 - 2.63 mg/dL 1.84 1.73 3.26 (H)   Monoclonal Peak Latest Ref Range: 0.0 g/dL 0.3 (H) 0.3 (H) 0.4 (H)     ASSESSMENT:   1- This is a 69-year-old gentleman with monoclonal gammopathy of unknown significance.  Given abnormal light chain ratio a bone marrow biopsy was recommended, patient declined in the past. He is being monitored with periodic exam and labs.   - His labs show further abnormality of light chains. No other evidence of end organ damage. I discussed that a bone marrow biopsy would be reasonable to assess for possibility of multiple myeloma. He agrees to it.   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     PLAN:     1. Labs before next visit- CBC diff, CMP, IFXN, SPEP, light chain, B12  2.  RTC MD 4 months  3- B12 level today  4- Change vitamin B12 2000 mcg once a week  5- Schedule an appointment with Dr. Paige for chronic diarrhea  6- Schedule a bone marrow biopsy  CASSIDY MAYO MD    5/31/2017    Total time spent 30 minutes, more than 50% of which were  spent in counselling and coordination of care.       CC: Danny Paige MD

## 2017-06-01 LAB — VIT B12 SERPL-MCNC: 2484 PG/ML (ref 193–986)

## 2017-06-07 ENCOUNTER — TELEPHONE (OUTPATIENT)
Dept: ONCOLOGY | Facility: CLINIC | Age: 72
End: 2017-06-07

## 2017-06-07 NOTE — TELEPHONE ENCOUNTER
Received positive distress screen regarding patient's concern for ability to eat.  Called and spoke with patient's wife as patient was unavailable.  Patient's wife states patient eats 1 meal per day and snacks throughout the day which he has done for years.  Encouraged wife if patient desires to discuss nutrition concerns to make appointment with RD when he returns to clinic for follow up appointment with MD.  Patient's wife verbalized understanding of plan.    Angela Duque, RD, LD  Clinical Dietitian  [facility]  [phone] (direct)

## 2017-06-08 ENCOUNTER — HOSPITAL ENCOUNTER (OUTPATIENT)
Facility: CLINIC | Age: 72
Discharge: HOME | End: 2017-06-08
Attending: PATHOLOGY | Admitting: PATHOLOGY
Payer: MEDICARE

## 2017-06-08 VITALS
DIASTOLIC BLOOD PRESSURE: 74 MMHG | SYSTOLIC BLOOD PRESSURE: 120 MMHG | HEIGHT: 68 IN | OXYGEN SATURATION: 94 % | RESPIRATION RATE: 13 BRPM

## 2017-06-08 DIAGNOSIS — D47.2 MGUS (MONOCLONAL GAMMOPATHY OF UNKNOWN SIGNIFICANCE): ICD-10-CM

## 2017-06-08 LAB
BASOPHILS # BLD AUTO: 0.1 10E9/L (ref 0–0.2)
BASOPHILS NFR BLD AUTO: 0.5 %
DIFFERENTIAL METHOD BLD: ABNORMAL
EOSINOPHIL # BLD AUTO: 0.3 10E9/L (ref 0–0.7)
EOSINOPHIL NFR BLD AUTO: 2.5 %
ERYTHROCYTE [DISTWIDTH] IN BLOOD BY AUTOMATED COUNT: 14.3 % (ref 10–15)
HCT VFR BLD AUTO: 37 % (ref 40–53)
HGB BLD-MCNC: 12.5 G/DL (ref 13.3–17.7)
IMM GRANULOCYTES # BLD: 0.1 10E9/L (ref 0–0.4)
IMM GRANULOCYTES NFR BLD: 0.5 %
LYMPHOCYTES # BLD AUTO: 2.1 10E9/L (ref 0.8–5.3)
LYMPHOCYTES NFR BLD AUTO: 18 %
MCH RBC QN AUTO: 32.6 PG (ref 26.5–33)
MCHC RBC AUTO-ENTMCNC: 33.8 G/DL (ref 31.5–36.5)
MCV RBC AUTO: 96 FL (ref 78–100)
MONOCYTES # BLD AUTO: 1 10E9/L (ref 0–1.3)
MONOCYTES NFR BLD AUTO: 8.8 %
NEUTROPHILS # BLD AUTO: 8 10E9/L (ref 1.6–8.3)
NEUTROPHILS NFR BLD AUTO: 69.7 %
NRBC # BLD AUTO: 0 10*3/UL
NRBC BLD AUTO-RTO: 0 /100
PLATELET # BLD AUTO: 338 10E9/L (ref 150–450)
RBC # BLD AUTO: 3.84 10E12/L (ref 4.4–5.9)
RETICS # AUTO: 61.8 10E9/L (ref 25–95)
RETICS/RBC NFR AUTO: 1.6 % (ref 0.5–2)
WBC # BLD AUTO: 11.5 10E9/L (ref 4–11)

## 2017-06-08 PROCEDURE — 88275 CYTOGENETICS 100-300: CPT | Performed by: INTERNAL MEDICINE

## 2017-06-08 PROCEDURE — 88237 TISSUE CULTURE BONE MARROW: CPT | Performed by: INTERNAL MEDICINE

## 2017-06-08 PROCEDURE — 88311 DECALCIFY TISSUE: CPT | Performed by: INTERNAL MEDICINE

## 2017-06-08 PROCEDURE — 88341 IMHCHEM/IMCYTCHM EA ADD ANTB: CPT | Performed by: INTERNAL MEDICINE

## 2017-06-08 PROCEDURE — 88311 DECALCIFY TISSUE: CPT | Mod: 26 | Performed by: INTERNAL MEDICINE

## 2017-06-08 PROCEDURE — 88280 CHROMOSOME KARYOTYPE STUDY: CPT | Performed by: INTERNAL MEDICINE

## 2017-06-08 PROCEDURE — 88313 SPECIAL STAINS GROUP 2: CPT | Performed by: INTERNAL MEDICINE

## 2017-06-08 PROCEDURE — 40000424 ZZHCL STATISTIC BONE MARROW CORE PERF TC 38221: Performed by: INTERNAL MEDICINE

## 2017-06-08 PROCEDURE — 40000795 ZZHCL STATISTIC DNA PROCESS AND HOLD: Performed by: INTERNAL MEDICINE

## 2017-06-08 PROCEDURE — 88305 TISSUE EXAM BY PATHOLOGIST: CPT | Performed by: INTERNAL MEDICINE

## 2017-06-08 PROCEDURE — 85097 BONE MARROW INTERPRETATION: CPT | Performed by: INTERNAL MEDICINE

## 2017-06-08 PROCEDURE — G0364 BONE MARROW ASPIRATE &BIOPSY: HCPCS

## 2017-06-08 PROCEDURE — 38221 DX BONE MARROW BIOPSIES: CPT | Performed by: INTERNAL MEDICINE

## 2017-06-08 PROCEDURE — 38221 DX BONE MARROW BIOPSIES: CPT | Performed by: PATHOLOGY

## 2017-06-08 PROCEDURE — 00000159 ZZHCL STATISTIC H-SEND OUTS PREP: Performed by: INTERNAL MEDICINE

## 2017-06-08 PROCEDURE — 88185 FLOWCYTOMETRY/TC ADD-ON: CPT | Performed by: INTERNAL MEDICINE

## 2017-06-08 PROCEDURE — 40000948 ZZHCL STATISTIC BONE MARROW ASP TC 85097: Performed by: INTERNAL MEDICINE

## 2017-06-08 PROCEDURE — 40001004 ZZHCL STATISTIC FLOW INT 9-15 ABY TC 88188: Performed by: INTERNAL MEDICINE

## 2017-06-08 PROCEDURE — 88182 CELL MARKER STUDY: CPT | Performed by: INTERNAL MEDICINE

## 2017-06-08 PROCEDURE — 99152 MOD SED SAME PHYS/QHP 5/>YRS: CPT | Performed by: PATHOLOGY

## 2017-06-08 PROCEDURE — 88313 SPECIAL STAINS GROUP 2: CPT | Mod: 26 | Performed by: INTERNAL MEDICINE

## 2017-06-08 PROCEDURE — 88305 TISSUE EXAM BY PATHOLOGIST: CPT | Mod: 26 | Performed by: INTERNAL MEDICINE

## 2017-06-08 PROCEDURE — 88184 FLOWCYTOMETRY/ TC 1 MARKER: CPT | Performed by: INTERNAL MEDICINE

## 2017-06-08 PROCEDURE — 85045 AUTOMATED RETICULOCYTE COUNT: CPT | Performed by: PATHOLOGY

## 2017-06-08 PROCEDURE — 88271 CYTOGENETICS DNA PROBE: CPT | Performed by: INTERNAL MEDICINE

## 2017-06-08 PROCEDURE — 88264 CHROMOSOME ANALYSIS 20-25: CPT | Performed by: INTERNAL MEDICINE

## 2017-06-08 PROCEDURE — 25000125 ZZHC RX 250: Performed by: FAMILY MEDICINE

## 2017-06-08 PROCEDURE — G0364 BONE MARROW ASPIRATE &BIOPSY: HCPCS | Performed by: INTERNAL MEDICINE

## 2017-06-08 PROCEDURE — 40000847 ZZHCL STATISTIC MORPHOLOGY W/INTERP HISTOLOGY TC 85060: Performed by: INTERNAL MEDICINE

## 2017-06-08 PROCEDURE — 88342 IMHCHEM/IMCYTCHM 1ST ANTB: CPT | Performed by: INTERNAL MEDICINE

## 2017-06-08 PROCEDURE — 85060 BLOOD SMEAR INTERPRETATION: CPT | Performed by: INTERNAL MEDICINE

## 2017-06-08 PROCEDURE — 00000058 ZZHCL STATISTIC BONE MARROW ASP PERF TC 38220: Performed by: INTERNAL MEDICINE

## 2017-06-08 PROCEDURE — 85025 COMPLETE CBC W/AUTO DIFF WBC: CPT | Performed by: PATHOLOGY

## 2017-06-08 PROCEDURE — 36415 COLL VENOUS BLD VENIPUNCTURE: CPT | Performed by: PATHOLOGY

## 2017-06-08 PROCEDURE — 88342 IMHCHEM/IMCYTCHM 1ST ANTB: CPT | Mod: 26 | Performed by: INTERNAL MEDICINE

## 2017-06-08 PROCEDURE — 88341 IMHCHEM/IMCYTCHM EA ADD ANTB: CPT | Mod: 26 | Performed by: INTERNAL MEDICINE

## 2017-06-08 PROCEDURE — 25000128 H RX IP 250 OP 636: Performed by: PATHOLOGY

## 2017-06-08 RX ORDER — FLUMAZENIL 0.1 MG/ML
0.2 INJECTION, SOLUTION INTRAVENOUS
Status: DISCONTINUED | OUTPATIENT
Start: 2017-06-08 | End: 2017-06-08 | Stop reason: HOSPADM

## 2017-06-08 RX ORDER — FENTANYL CITRATE 50 UG/ML
25 INJECTION, SOLUTION INTRAMUSCULAR; INTRAVENOUS EVERY 5 MIN PRN
Status: DISCONTINUED | OUTPATIENT
Start: 2017-06-08 | End: 2017-06-08 | Stop reason: HOSPADM

## 2017-06-08 RX ORDER — FENTANYL CITRATE 50 UG/ML
25-50 INJECTION, SOLUTION INTRAMUSCULAR; INTRAVENOUS
Status: DISCONTINUED | OUTPATIENT
Start: 2017-06-08 | End: 2017-06-08 | Stop reason: HOSPADM

## 2017-06-08 RX ORDER — NALOXONE HYDROCHLORIDE 0.4 MG/ML
.1-.4 INJECTION, SOLUTION INTRAMUSCULAR; INTRAVENOUS; SUBCUTANEOUS
Status: DISCONTINUED | OUTPATIENT
Start: 2017-06-08 | End: 2017-06-08 | Stop reason: HOSPADM

## 2017-06-08 RX ORDER — FENTANYL CITRATE 50 UG/ML
INJECTION, SOLUTION INTRAMUSCULAR; INTRAVENOUS PRN
Status: DISCONTINUED | OUTPATIENT
Start: 2017-06-08 | End: 2017-06-08 | Stop reason: HOSPADM

## 2017-06-08 NOTE — BRIEF OP NOTE
The patient was positively identified and informed consent was obtained (see the completed Affirmation of Consent for Bone Marrow Aspiration and/or Biopsy Procedure(s) form in the patient's chart). The patient was placed in the prone position and the bony landmarks of the pelvis were identified. Medical staff reconfirmed the patient's name, date of birth and procedure. The skin over the posterior iliac crest was scrubbed and draped in a sterile fashion. The local area of the procedure was anesthetized with a total of 10 mL of 1% Lidocaine and a small incision was made.  The patient did receive conscious sedation.    Trephine bone marrow core(s) was/were obtained from the right posterior iliac crest. Bone marrow aspirate was obtained from the right posterior iliac crest for: morphology with possible immunophenotyping and/or cytogenetics and molecular diagnostics    Direct pressure was applied to the biopsy site with sterile gauze. The biopsy site was cleaned with alcohol and a sterile dressing was placed over the biopsy incision using a pressure bandage. The patient was then placed in the supine position to maintain pressure on the biopsy site. Post-procedure wound care instructions, including routine dressing instructions and analgesia, were given to the patient. The procedure was completed without complication.

## 2017-06-08 NOTE — PROGRESS NOTES
[facility]  House Physician Consult Note     1100 - Thurs 8 June 2017   Outpatient Procedures / Endoscopy       PROBLEMS   ACUTE/ CHRONIC       - Full Code (8/9/16)   -   - 82.9 kg   -   -   -   - IgG monoclonal gammopathy   -   -   -   - Anemia   -   - Elevated ferritin   -   - Vitamin B12 deficiency   -   -   -   -   -   -   - Alcohol abuse   -   - hx Tob - Quit ~'13  -     - ~40py = 2-3ppd x 20y     - py = pack year     - ppd = pack per day   -   -   -   - Shingles   -   -   - mixed hyperlipidemia 3/15/05 -    - (LDL < 100)   -   -   -   - Vocal cord cancer   -   -   - MEL (obstructive sleep apnea) -    - probs w cpap mask - new soon   -   - dyspnea   - amio lung toxicity --> O2 / pred   - acute resp failure / hypoxia   - decreased diffusion capacity   -   - Community Acquired Pneumonia (?yr)  -   -   -   -   - atrial fibrillation/ flutter    - CVA (cerebral infarction) 5/5/2015   -   - ?CAD - angio next week per pt   - PVD (peripheral vascular disease)   -   - Mitral Regurgitation (?)   -   -   - Urinary retention   -   -   - Constipation   - Redundant colon   - Collagenous colitis   - Lymphocytic colitis   - noninfectious gastroenteritis, colitis   -   -   -   -   - Encephalopathy 3/15/2005   -   - Demyelinating disease of CNS unspecified   - acute diseminated encephalitis   -   - Shingles   -   -   -   -   -   - Carpal Tunnel Syndrome   -   - Rash and nonspecific skin eruption   -   -   -   - 71M (R-handed)    -   - Full Code (8/9/16)  -   -   - amiodarone -     --> lung toxicity   -   - furosemide/Lasix -     --> throat swelling / wheezing   -   - sulfa drugs -     --> difficulty breathing   -   - penicillins -     --> ?   -   - adhesive tape -     --> blisters  -   -   - 82.9 kg / 1.73 m   -   - 2 m  (BSA)  -   - 27.39 kg/m  (BMI)   -   - Physical deconditioning   -   -   - Health Care Home   -   - ?Advanced Care Directives -    - discussions/counseling   -   -   -     MEDICATIONS  (NEW/CHANGES)     -   -   -   -   -   -   -   -   -   -   -   -   -   -   - cyanocobalamin (B-12) 2000   -   -   -    -   -   -   - (acetaminophen/Tylenol 1000/ 6h prn)   -   -   -   -   -   -   -   -    -   -   -   - simvastatin/Zocor 40 hs   -   -   -   -   -   -   -   -   -   -   -   -   -   -   -   -   -   -   -   -   - aspirin 81   - apixaban/Eliquis 5   - metoprolol (LOPRESSOR) 12.5 bid   - digoxin (LANOXIN) 125   -   -   -   -   -   -   -   -   - polyethylene glycol   -   - needs CT colonography   -   -   -   -   -   -   -   -   -   -   -   -   - gabapentin (NEURONTIN) 600 hs   -   - acetaminophen/Tylenol 1000/ 6h prn   -   -   -   -   -   -   -   -   -   - multivitamin (OCUVITE)   -   -   -   -   -   -   -   -   -   -   -   -   -   -   -   -   -   -   -   -   -   -   -   -   -   -   -   -   -   -   -   -   -      # Procedural Physician Assessment For Moderate/Conscious Sedation    Procedure- Bone Marrow Bx  Indication - IgG monoclonal gammopathy      A/P -   - patient assessment completed immediately prior to procedure by me   - vital signs have been reviewed   - airway, lungs, and heart have been assessed   - physical status classification is P2**     - (P2 -  A patient with mild systemic disease)   - risks, benefits, and alternatives to moderate sedation explained and consent obtained   - has  home after sedation   - approved for moderate/conscious sedation for Bone Marrow Bx    --> proceed with planned sedation       JACQUELIN Smyth MD/  15 min   [facility] Physician / [phone]    _______________________________________      History -   - NPO > 6 hrs   - no previous anesthesia/sedation problems       - (?) heart disease - cor angiogram next week   - (+) valve disease - mitral valve     - (+) lung disease - hx amiodarone toxicity   - (+) sleep apnea - difficulty w cpap mask - new soon   - no smoking or significant exposure to tobacco smoke in last year     - hx Tob - Quit ~'13  -       -  ~40py = 2-3ppd x 20y       - py = pack year       - ppd = pack per day       - no bleeding tendency/disorder     Lab Results   Component Value Date     05/23/2017      - (+) aspirin 81 - stopped 7 days ago   - (+) apixaban/Eliquis        - no recent infection     - no glaucoma         Exam -   Gen  - looks good - NAD    - alert, cooperative    - nl speech / Lang   - breathing easily - nl rate/ effort/ depth    - color-good, warm, dry      VS  - 122/75 - 65     - 94% - RA - 16 - easy - nl depth   - afebrile     ENT - Mallampati Class I*     - (MC-I - Faucial pillars, soft palate, and uvula are visible)   - able to open mouth    - able to stick tongue out     Lungs - nl - clear ausc bilat equal posterior base     Heart  - nl - RRR, nl s1s2, 2/6 systolic murmur -      - best ursb (upper right sternal border)                         [facility] Procedure Note        Sedation:      Performed by: Romel Smyth  Authorized by: Romel Smyth    Pre-Procedure Assessment done at 1130.    Expected Level:  Moderate Sedation    Indication:  Sedation is required to allow for Bone Marrow Biopsy      Consent obtained from patient after discussing the risks, benefits and alternatives.    PO Intake:  Appropriately NPO for procedure    ASA Class:  Class 2 - MILD SYSTEMIC DISEASE, NO ACUTE PROBLEMS, NO FUNCTIONAL LIMITATIONS.    Mallampati:  Grade 1:  Soft palate, uvula, tonsillar pillars, and posterior pharyngeal wall visible    Lungs: Lungs Clear with good breath sounds bilaterally.     Heart: Normal heart sounds and rate    History and physical reviewed and no updates needed. I have reviewed the lab findings, diagnostic data, medications, and the plan for sedation. I have determined this patient to be an appropriate candidate for the planned sedation and procedure and have reassessed the patient IMMEDIATELY PRIOR to sedation and procedure      Sedation Post Procedure Summary:    Prior to the  start of the procedure and with procedural staff participation, I verbally confirmed the patient s identity using two indicators, relevant allergies, that the procedure was appropriate and matched the consent or emergent situation, and that the correct equipment/implants were available. Immediately prior to starting the procedure I conducted the Time Out with the procedural staff and re-confirmed the patient s name, procedure, and site/side. (The Joint Commission universal protocol was followed.)    --> No (Not Applicable for House MD as Pathologist does Bone Marrow Bx)       Sedatives: Fentanyl and Midazolam (Versed)    Vital signs, airway, and pulse oximetry were monitored and remained stable throughout the procedure and sedation was maintained until the procedure was complete.  The patient was monitored by staff until sedation discharge criteria were met.    Patient tolerance: Patient tolerated the procedure well with no immediate complications.    Time of sedation in minutes:  20 minutes from beginning to end of physician one to one monitoring.      JACQUELIN Smyth MD/  15 min   [facility] Physician / [phone]    __________________________________                  [facility] Policy and Procedure Manual -    -System Policy--Code: S:PC-2012 -      -Sedation and Analgesia for Proced    *Mallampati Classification   Class  Definintion   I   Faucial pillars/ soft palate/ uvula - all visible   II   Faucial pillars/ soft palate seen - uvula blocked by base of tongue   III   Soft palate only visible => difficult Intubation predicted    IV   Soft palate not visible => difficult Intubation predicted      - The Mallampati classification is based on the finding that visualization of the glottis is impaired when the base of the tongue is disproportionately large     - Assessment is made with the patient sitting upright, with head in neutral position, the mouth open as wide as possible, and the tongue protruded maximally     - The  "modified classification includes four categories/classes above           **ASA Physical Classification System  Class  Definintion   P1  A normal healthy patient   P2  A patient with mild systemic disease   P3  A patient with severe systemic disease   P4  A patient with severe systemic disease that is a constant threat to life   P5  A moribund patient who is not expected to survive without the operation   P6  A declared brain-dead patient whose organs are being removed for donor purposes     (http://[email or web address].org/clinical/physicalstatus.htm; March 9, 2006)                          APPENDICES -   - Keystrokes for convenience   - Key to symbols, abbreviations, and format conventions         Keystrokes for convenience  -   - from on-line summary article by Ozzie Balderas-- + \"all-around computer geek\"    - Working with words instead of letters   - Moving the Cursor   - Selecting text   - Combine in sequence   - Editing   - Formatting   - Functions           Working with words instead of letters -     - using L or R arrow  (<- / ->), Backspace (<--), or Delete keys to select single characters     - add Control key (option key for [employer] users) to apply to entire words or phrases          - C/<-   - Ctrl + L arrow  - move cursor to beginning of previous word   - C/->   - Ctrl + R arrow - move cursor to beginning of next word     - C/<--  - Ctrl + Backspace  - delete previous word   - C/delete  - Ctrl + Delete  - delete next word     - C/up   - Ctrl + up arrow  - select text from cursor to beginning current paragraph or text entry field   - C/dn   - Ctrl + dn arrow  - select text from cursor to end current paragraph or text entry field                 Moving the Cursor -     - Home   -   - beginning of current line   - End   -   - end of current line   - C/Home  - Ctrl + Home  - top of text entry field   - C/End   - Ctrl + End  - bottom of text entry field   - pg up   - Page Up  - up a frame   - pg dn   - Page " "Down  - down a frame                 Selecting text -      - ^/end   - shift + end   - select to end of current line   - ^/home  - shift + home  - select to beginning of current line     - C/Lclk  - ctrl* + L click   - select individual smartphrases/entries/lines from  list   - ^/Lclk   - shift + L click   - select all smartphrases/entries/lines from initial selection to next     - ^<-   - shift + L arrow   - select characters one at a time   - ^->   - shift + R arrow   - select characters one at a time     - ^up    - shift  + up arrow   - select line above   - ^dn   - shift + dn arrow   - select line below     - ^C<-  - shift + ctrl + L arrow  - select words   - ^C->   - shift + ctrl + R arrow  - select words     - ^up  - shift + up arrow   - select paragraph above cursor   - ^dn   - shift + dn arrow   - select paragraph below  cursor      - ^Home  - shift + Home   - select text between cursor and beginning of line   - ^End   - shift + End   - select text between cursor and end of line     - ^C/Home - shift + ctrl + Home   - select text between cursor and beginning of next text entry field -------  - ^C/End   - shift + ctrl + End   - select text between cursor and end of next text entry field     - ^pg dn   - shift + page down   - select frame of text below cursor   - ^pg up   - shift + page up   - select frame of text above cursor     - C/A  - Ctrl + A    - select all text               Combine in sequence -     - ^End      - select to end line   - ^dn     - select line below   - (not need \"delete\" first, just start typing)               Editing -     - C/C   - Ctrl + C*   - copy selected text   - C/Insert  - Ctrl + Insert   - copy selected text     - C/X  -Ctrl + X*    - cut/delete selected text   - ^Del  - shift + Delete   - cut/delete selected text     - C/V   - Ctrl + V*   - paste   - ^Insert   - shift + Insert   - paste     - C/Z   - Ctrl + Z*   - undo   - C/Y   - Ctrl + Y*   - redo     - C/Z   - Ctrl + " Z*   - go back 1 step   - A/<--  - Alt + Backspace   - go back 1 step (same as Ctrl + Z)                Formatting -     - C/B  - Ctrl + B*   - Bold   - C/I  - Ctrl + I*    - Italics   - C/U  - Ctrl .+ U*  - Underline               Functions -     - C/F   - Ctrl + F    - Find   - F3   - F3    - Find Next   - ^F3  - shift + F3   - Find Previous     - C/O  - Ctrl + O   - Open   - C/S   - Ctrl + S   - Save   - C/N   - Ctrl + N   - New doc   - C/P   - Ctrl + P   - Print     - A   - Alt**    - activate application's menu bar   - A/F   - Alt + F    - open File menu   - A/E  - Alt + E    - open Edit menu   - A/V  - Alt + V    - open View menu                                     ______________________________            Key to symbols, abbreviations, and format conventions - [ordered by sense / meaning]     Symbol (Description)  Meaning / interpretation     #  (number/ hashtag)   - problem - summary which does not need editing if copied forward     -  (dash)   - data - s/sx (changes) found/observed at time pt seen     --  (double dash/ hyphen)  - Assessment/ Interpretation/ Impression - of observations in context of problem -       - - improved, worsened no change   -->  (arrow)    - Plan - action to be done in response to assessment -       - - either that day either or after discharge       --->>  (double arrow)   - cont same plan (eg --->> lisinopril  =  Cont lisinopril )        ([employer])   - degrees    [   ] (brackets)  - [author's (my own) comments/thoughts/additions/assumptions]      0'4 (apostrophe)   - instead of period - not stop highlighting entire line as a single unit - means 0.4           Abbrv'n    Abbreviation    - Time -   -    - date-time (month implied)  - (-2d)  - 2d ago   - d  - day(s)   - m - min(s)   - minute(s)   - h  - hr(s)   - hour(s)   - mo  - month(s)       - c  - with   - w /   - with     - s  - without   - w /o  - without     - x  - except     - p  - after   - a  - before     - c/w  -  consistent with   - c/c  - compare/contrast - ie differentiate from       - sx  - symptomatic   - asx  - asymptomatic       - sx  - symptoms   - s/sx  - signs/symptoms       - dx  - diagnosis   - dz  - disease   - dz  - dizzy   - ez  - easy       - tx  - treatment (or transplant)   - Tx  - Transplant (eg. LRD RTx - Living Related Donor Renal Transplant)     - px  - prophylaxis   - pphx  - prophylaxis       - d/c  - discharge (eg from hospital or from eye)       - cx  - culture (or canceled)   - cx  - cancelled       - fx  - fracture   - rxn  - reaction   - fxn  - function       - yest  - yesterday - (or yday)   - hector  - tomorrow       - nl  - normal   - abnl  - abnormal     - incr'd  -   - decr'd -     - hr (r)  - hyper-   - ho (o) - hypo-      - abx  - antibiotics       - cp  - chest pain   - sob  - shortness of breath       - dz/ lh - dizziness / lightheadedness   - HA - HeadAche (ha)   - H/N  - Head / Neck (h/n)       - f/c  - fevers/chills   - n/v  - nausea/vomiting       - cp  - chest pain/ pressure   - sob  - shortness of breath       - eg  - for example (e.g.)   - ie  - in other words (i.e.)       - 2   - secondary to (caused by, because of)   - 2/2  - secondary to (caused by, because of)     - dtr  - daughter   - mo  - mother   - fa  - father     - *o/w  - otherwise   - usu - usual     - * note: capital L used for visual clarity/emphasis where otherwise lower case is strictly correct -   - - eg in generic names drugs Labetalol or mL milliLiters        - [Cap]* - Capital Letter  - not need capital - only capital for clarity/emphasis in presentation here   - *Ctrl  - Control   - **Alt - Alternate         - BMBx  - Bone Marrow Biopsy             Anatomic Relationships -       - Lat - lateral (Lateral)        - prox  - proximal       Laterality (side) -   - bLat  - biLateral  - both sides   - iLat  - ipsiLateral  - same side   - cLat  - contraLateral  - opposite side       - L*  - Left*   - R  - Right   - B  -  Bilateral   - (karina)* - (any anatomical structure) + * (asterix) => Bilateral implied unless side / laterality specified    -- eg. Calf* => calf (B)    -- eg. Calf -  R .... L .... => different findings for R or L specified         - * note: capital L used for visual clarity/emphasis where otherwise lower case is strictly correct -   - - eg in generic names drugs Labetalol or mL milliLiters       - eg -     - UE  - Upper Extremity   - LE - Lower Extremity     - LUE   - Left Upper Extremity   - LLE  - Right Lower Extremity     - RUE  - Right Upper Extremity   - RLE  - Right Lower Extremity     - BU/LE  - Bilat Upper/Lower Extremities       __________________________________________

## 2017-06-09 LAB — COPATH REPORT: NORMAL

## 2017-06-12 LAB
COPATH REPORT: NORMAL
COPATH REPORT: NORMAL

## 2017-06-15 ENCOUNTER — TRANSFERRED RECORDS (OUTPATIENT)
Dept: HEALTH INFORMATION MANAGEMENT | Facility: CLINIC | Age: 72
End: 2017-06-15

## 2017-06-15 ENCOUNTER — HOSPITAL ENCOUNTER (OUTPATIENT)
Dept: CARDIOLOGY | Facility: CLINIC | Age: 72
Discharge: HOME | End: 2017-06-15
Attending: PHYSICIAN ASSISTANT | Admitting: PHYSICIAN ASSISTANT
Payer: MEDICARE

## 2017-06-15 DIAGNOSIS — I35.0 NONRHEUMATIC AORTIC VALVE STENOSIS: ICD-10-CM

## 2017-06-15 LAB
ANION GAP SERPL CALCULATED.3IONS-SCNC: 10.2 MMOL/L (ref 6–17)
BUN SERPL-MCNC: 11 MG/DL (ref 7–30)
CALCIUM SERPL-MCNC: 9.3 MG/DL (ref 8.5–10.5)
CHLORIDE SERPL-SCNC: 106 MMOL/L (ref 98–107)
CO2 SERPL-SCNC: 27 MMOL/L (ref 23–29)
CREAT SERPL-MCNC: 1.02 MG/DL (ref 0.7–1.3)
GFR SERPL CREATININE-BSD FRML MDRD: 72 ML/MIN/1.7M2
GLUCOSE SERPL-MCNC: 93 MG/DL (ref 70–105)
POTASSIUM SERPL-SCNC: 4.2 MMOL/L (ref 3.5–5.1)
SODIUM SERPL-SCNC: 139 MMOL/L (ref 136–145)

## 2017-06-15 PROCEDURE — 36415 COLL VENOUS BLD VENIPUNCTURE: CPT | Performed by: PHYSICIAN ASSISTANT

## 2017-06-15 PROCEDURE — 80048 BASIC METABOLIC PNL TOTAL CA: CPT | Performed by: PHYSICIAN ASSISTANT

## 2017-06-15 PROCEDURE — 93306 TTE W/DOPPLER COMPLETE: CPT | Mod: 26 | Performed by: INTERNAL MEDICINE

## 2017-06-15 PROCEDURE — 25500064 ZZH RX 255 OP 636: Performed by: PHYSICIAN ASSISTANT

## 2017-06-15 PROCEDURE — 93306 TTE W/DOPPLER COMPLETE: CPT

## 2017-06-15 RX ADMIN — SULFUR HEXAFLUORIDE 2 ML: KIT at 15:45

## 2017-06-16 ENCOUNTER — OFFICE VISIT (OUTPATIENT)
Dept: CARDIOLOGY | Facility: CLINIC | Age: 72
End: 2017-06-16
Attending: PHYSICIAN ASSISTANT
Payer: MEDICARE

## 2017-06-16 VITALS
BODY MASS INDEX: 26.51 KG/M2 | WEIGHT: 179 LBS | HEIGHT: 69 IN | SYSTOLIC BLOOD PRESSURE: 98 MMHG | DIASTOLIC BLOOD PRESSURE: 60 MMHG | HEART RATE: 65 BPM

## 2017-06-16 DIAGNOSIS — R94.31 ABNORMAL ELECTROCARDIOGRAM: ICD-10-CM

## 2017-06-16 DIAGNOSIS — R06.09 DYSPNEA ON EXERTION: Primary | ICD-10-CM

## 2017-06-16 DIAGNOSIS — I35.0 NONRHEUMATIC AORTIC VALVE STENOSIS: ICD-10-CM

## 2017-06-16 LAB — DIGOXIN SERPL-MCNC: 0.7 UG/L (ref 0.5–2)

## 2017-06-16 PROCEDURE — 93000 ELECTROCARDIOGRAM COMPLETE: CPT | Performed by: INTERNAL MEDICINE

## 2017-06-16 PROCEDURE — 99214 OFFICE O/P EST MOD 30 MIN: CPT | Mod: 25 | Performed by: INTERNAL MEDICINE

## 2017-06-16 PROCEDURE — 80162 ASSAY OF DIGOXIN TOTAL: CPT | Performed by: INTERNAL MEDICINE

## 2017-06-16 PROCEDURE — 36415 COLL VENOUS BLD VENIPUNCTURE: CPT | Performed by: INTERNAL MEDICINE

## 2017-06-16 RX ORDER — METOPROLOL SUCCINATE 25 MG/1
12.5 TABLET, EXTENDED RELEASE ORAL DAILY
Qty: 30 TABLET | Refills: 3 | Status: SHIPPED | OUTPATIENT
Start: 2017-06-16 | End: 2018-01-24

## 2017-06-16 NOTE — LETTER
6/16/2017    Danny Paige MD  [address]  [facility] [address]    RE: Efrem Manuelwell       Dear Colleague,    I had the pleasure of seeing Efrem Ramos in the [facility].    REASON FOR VISIT:  Followup visit.      Mr. Ramos is a pleasant 71-year-old gentleman who comes in routine followup.  He has a history of stroke with visual loss in the right eye, SVT and eventual diagnosis of atrial fibrillation and flutter.  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  He has a history of acute disseminated encephalomyelitis, sleep apnea treated, vocal cord cancer in remission and monoclonal gammopathy of unknown significance with no bone marrow biopsy per patient preference.      He has also got moderate aortic stenosis, stable on last echo done yesterday.  He has mild mitral stenosis and preserved systolic function.  He has mild to moderate pulmonary hypertension.      Mr. Ramos underwent right and left heart catheterization in 07/2016 showing nonobstructive disease in the distal RCA and mid LAD with negative FFRs.      He feels that he has gotten a bit more winded and fatigued over the past month, perhaps mildly worse than when he saw his pulmonologist in April of this year.  The pulmonologist felt that there had been some resolution or improvement in his amiodarone-induced pulmonary toxicity and put a followup out 6 more months with spirometry at that time.  He has had no change in his echocardiogram from prior.  He has no chest discomfort on exertion, can get lightheaded with change in position, no PND or orthopnea, and chronic left lower extremity pedal edema.  He has no significant palpitations, no presyncope nor syncope.      He has atrial fibrillation on anticoagulation and rate control with digoxin and metoprolol.     Outpatient Encounter Prescriptions as of 6/16/2017   Medication Sig Dispense Refill     metoprolol (TOPROL-XL) 25 MG 24 hr  tablet Take 0.5 tablets (12.5 mg) by mouth daily 30 tablet 3     gabapentin (NEURONTIN) 300 MG capsule Take 2 capsules (600 mg) by mouth every evening 60 capsule 5     apixaban ANTICOAGULANT (ELIQUIS) 5 MG tablet Take 1 tablet (5 mg) by mouth 2 times daily 180 tablet 3     acetaminophen (TYLENOL) 500 MG tablet Take 1,000 mg by mouth every 6 hours as needed for mild pain       [DISCONTINUED] simvastatin (ZOCOR) 40 MG tablet Take 1 tablet (40 mg) by mouth At Bedtime 90 tablet 3     [DISCONTINUED] digoxin (LANOXIN) 125 MCG tablet Take 1 tablet (125 mcg) by mouth daily 30 tablet 11     multivitamin (OCUVITE) TABS Take 1 tablet by mouth every morning       ASPIRIN PO Take 81 mg by mouth every morning        Cyanocobalamin (B-12) 2000 MCG TABS Take 1 tablet by mouth every morning        [DISCONTINUED] polyethylene glycol (MIRALAX) powder Take 17 g by mouth daily       [DISCONTINUED] metoprolol (LOPRESSOR) 25 MG tablet Take 0.5 tablets (12.5 mg) by mouth 2 times daily 30 tablet 11     No facility-administered encounter medications on file as of 6/16/2017.       ASSESSMENT AND PLAN:  Pleasant 71-year-old gentleman with moderate aortic stenosis, mild mitral stenosis, preserved systolic function and nonobstructive coronary disease on catheterization in 07/2016, with negative FFRs at that time.  He has had no change in his echocardiogram and only apparently some improvement from the standpoint of pneumonitis related to his amiodarone use last year, however, notes that he is more fatigued than short of breath on exertion over the past month or two.        At this point, I would decrease his metoprolol to metoprolol XL 12.5 mg twice daily.      He has atrial fibrillation on anticoagulation and rate control with digoxin and metoprolol.  The patient's atrial fibrillation appears rate controlled.  We are going to check a digoxin level and a 24-hour Holter monitor to see if this could be also contributing to his shortness of breath.   I will decrease the metoprolol with his low blood pressures and lowish heart rate as mentioned to 12.5 mg of XL daily.  We will check a Lexiscan sestamibi as the lesions on catheterization were close to 60%.      Further recommendations will be pending the findings above.      Total time is 30 minutes, 25 in coordination of care and counseling.     Again, thank you for allowing me to participate in the care of your patient.      Sincerely,    Bella Chavis MD     [facility]

## 2017-06-16 NOTE — PROGRESS NOTES
DIAGNOSES:      Encounter Diagnoses   Name Primary?     Nonrheumatic aortic valve stenosis      Dyspnea on exertion Yes     Abnormal electrocardiogram           HPI:  See dictation        MEDICATIONS:    Current Outpatient Prescriptions   Medication Sig Dispense Refill     metoprolol (TOPROL-XL) 25 MG 24 hr tablet Take 0.5 tablets (12.5 mg) by mouth daily 30 tablet 3     gabapentin (NEURONTIN) 300 MG capsule Take 2 capsules (600 mg) by mouth every evening 60 capsule 5     apixaban ANTICOAGULANT (ELIQUIS) 5 MG tablet Take 1 tablet (5 mg) by mouth 2 times daily 180 tablet 3     acetaminophen (TYLENOL) 500 MG tablet Take 1,000 mg by mouth every 6 hours as needed for mild pain       simvastatin (ZOCOR) 40 MG tablet Take 1 tablet (40 mg) by mouth At Bedtime 90 tablet 3     digoxin (LANOXIN) 125 MCG tablet Take 1 tablet (125 mcg) by mouth daily 30 tablet 11     multivitamin (OCUVITE) TABS Take 1 tablet by mouth every morning       ASPIRIN PO Take 81 mg by mouth every morning        Cyanocobalamin (B-12) 2000 MCG TABS Take 1 tablet by mouth every morning        polyethylene glycol (MIRALAX) powder Take 17 g by mouth daily           ALLERGIES     Allergies   Allergen Reactions     Adhesive Tape Blisters     Amiodarone      Lasix [Furosemide] Other (See Comments)     Throat swelling, wheezing     Penicillins Unknown     Sulfa Drugs Difficulty breathing       PAST MEDICAL HISTORY:  Past Medical History:   Diagnosis Date     Atrial fibrillation (H)      Cancer (H) vocal cord     Carpal tunnel syndrome     abstracted 7/3/02.     CVA (cerebral infarction) 5/5/2015     Demyelinating disease of central nervous system, unspecified (H)     abstracted 7/3/02.     Dyspnea      ENCEPHALOPATHY UNSPECIFIED  3/15/2005    acute diseminated encephalitis     Hyperlipidemia      Mixed hyperlipidemia 3/15/2005     Other and unspecified hyperlipidemia     abstracted 7/3/02.     Other and unspecified noninfectious gastroenteritis and  colitis(558.9)     abstracted 7/3/02.     Pneumonia 8/17/2016     Redundant colon     needs CT colonography     Shingles      SKIN DISORDERS NEC 3/15/2005     Sleep apnea        PAST SURGICAL HISTORY:  Past Surgical History:   Procedure Laterality Date     BIOPSY  brain 2002     BONE MARROW BIOPSY, BONE SPECIMEN, NEEDLE/TROCAR N/A 6/8/2017    Procedure: BIOPSY BONE MARROW;  UNILATERAL BONE MARROW BIOPSY (CONSCIOUS SEDATION) ;  Surgeon: Jamie Gonzales MD;  Location:  GI     C NONSPECIFIC PROCEDURE  as a child    T & A. abstracted 7/3/02.     C NONSPECIFIC PROCEDURE  early    CTR. abstracted 7/3/02.     HEAD & NECK SURGERY       ORTHOPEDIC SURGERY      left arm ulna reset after injury     THORACOSCOPIC WEDGE RESECTION LUNG Right 8/2/2016    Procedure: THORACOSCOPIC WEDGE RESECTION LUNG;  Surgeon: Abdelrahman Noriega MD;  Location:  OR       FAMILY HISTORY:  Family History   Problem Relation Age of Onset     Blood Disease Mother      Anemia     Cardiovascular Father      Cancer - colorectal Maternal Grandfather      Hypertension Brother      DIABETES Sister 5     Juvinile Diabetes passed at 36     Respiratory Other      Lung Cancer       SOCIAL HISTORY:  Social History     Social History     Marital status:      Spouse name: N/A     Number of children: N/A     Years of education: N/A     Social History Main Topics     Smoking status: Former Smoker     Packs/day: 1.00     Years: 30.00     Types: Cigarettes     Quit date: 7/26/2013     Smokeless tobacco: Never Used     Alcohol use 25.2 oz/week     42 Standard drinks or equivalent per week      Comment: 2 per day     Drug use: No     Sexual activity: Not Currently     Other Topics Concern     Caffeine Concern Yes     1 Coke occasionally      Special Diet No     Exercise No     Social History Narrative       Review of Systems:  Skin:  Positive for bruising   Eyes:  Positive for glasses  ENT:  Negative    Respiratory:  Positive for shortness of  "breath;dyspnea on exertion  Cardiovascular:  Negative for;palpitations;chest pain;edema Positive for;lightheadedness;dizziness;fatigue  Gastroenterology: Positive for poor appetite  Genitourinary:  Negative    Musculoskeletal:  Positive for joint pain  Neurologic:  Positive for stroke;incoordination  Psychiatric:  Positive for sleep disturbances  Heme/Lymph/Imm:  Positive for    Endocrine:  Negative      Physical Exam:  Vitals: BP 98/60  Pulse 65  Ht 1.74 m (5' 8.5\")  Wt 81.2 kg (179 lb)  BMI 26.82 kg/m2    Constitutional:  in no acute distress;cooperative, alert and oriented, well developed, well nourished, in no acute distress frail      Skin:  warm and dry to the touch        Head:  normocephalic;normocephalic, no masses or lesions        Eyes:  pupils equal and round;pupils equal and round, conjunctivae and lids unremarkable, sclera white, no xanthalasma, EOMS intact, no nystagmus        ENT:  no pallor or cyanosis        Neck:  JVP normal;carotid pulses are full and equal bilaterally        Chest:  normal breath sounds, clear to auscultation, normal A-P diameter, normal symmetry, normal respiratory excursion, no use of accessory muscles        Cardiac: no S3 or S4             S1, S2 regular with 3/6 ESM LUSB    Abdomen:  abdomen soft;non-tender;BS normoactive        Vascular: pulses full and equal                                        Extremities and Back:        RLE edema;trace;pitting LLE edema;pitting;1+      Neurological:  affect appropriate, oriented to time, person and place;no gross motor deficits          ASSESSMENT AND PLAN:    See dictation    ORDERS AT TODAY'S VISIT:    Orders Placed This Encounter   Procedures     NM Lexiscan stress test     Digoxin level     Follow-Up with Cardiac Advanced Practice Provider     Holter Monitor 24 hour - Adult     EKG 12-lead complete w/read - Clinics (performed today)           MINNIE Lyons PA-C  [facility] AT [address]  [place] [id number]

## 2017-06-16 NOTE — MR AVS SNAPSHOT
After Visit Summary   6/16/2017    Efrem Raoms    MRN: [id number]           Patient Information     Date Of Birth          1945        Visit Information        Provider Department      6/16/2017 12:45 PM Bella Chavis MD [facility] [place]        Today's Diagnoses     Dyspnea on exertion    -  1    Nonrheumatic aortic valve stenosis        Abnormal electrocardiogram            Follow-ups after your visit        Additional Services     Follow-Up with Cardiac Advanced Practice Provider                 Your next 10 appointments already scheduled     Sep 20, 2017  1:00 PM CDT   Return Visit with  Oncology Nurse   [facility]    [facility] [place]  [address]  [place] [address] [phone]            Sep 27, 2017  1:00 PM CDT   Return Visit with Shae Aldana MD   [facility]    [facility] [place]  [address]  [place] [id number] [phone]              Future tests that were ordered for you today     Open Future Orders        Priority Expected Expires Ordered    Follow-Up with Cardiac Advanced Practice Provider Routine 6/28/2017 6/16/2019 6/16/2017    Digoxin level Routine 6/16/2017 6/16/2019 6/16/2017    NM Lexiscan stress test Routine 6/16/2017 6/16/2018 6/16/2017    Holter Monitor 24 hour - Adult Routine 6/16/2017 7/31/2017 6/16/2017    EKG 12-lead complete w/read - Clinics (performed today) Routine 6/16/2017 7/6/2017 6/16/2017            Who to contact     If you have questions or need follow up information about today's clinic visit or your schedule please contact [facility] directly at [phone].  Normal or non-critical lab and imaging results will be communicated to you by MyChart, letter or phone within 4 business days after the clinic has received the  "results. If you do not hear from us within 7 days, please contact the clinic through [employer] or phone. If you have a critical or abnormal lab result, we will notify you by phone as soon as possible.  Submit refill requests through [employer] or call your pharmacy and they will forward the refill request to us. Please allow 3 business days for your refill to be completed.          Additional Information About Your Visit        [employer] Information     [employer] gives you secure access to your electronic health record. If you see a primary care provider, you can also send messages to your care team and make appointments. If you have questions, please call your primary care clinic.  If you do not have a primary care provider, please call [phone] and they will assist you.        Care EveryWhere ID     This is your Care EveryWhere ID. This could be used by other organizations to access your [place] medical records  [id number]        Your Vitals Were     Pulse Height BMI (Body Mass Index)             65 1.74 m (5' 8.5\") 26.82 kg/m2          Blood Pressure from Last 3 Encounters:   06/16/17 98/60   06/08/17 120/74   05/31/17 98/61    Weight from Last 3 Encounters:   06/16/17 81.2 kg (179 lb)   05/31/17 82.9 kg (182 lb 12.8 oz)   02/10/17 84.4 kg (186 lb)              We Performed the Following     Follow-Up with Cardiologist          Today's Medication Changes          These changes are accurate as of: 6/16/17  1:29 PM.  If you have any questions, ask your nurse or doctor.               Start taking these medicines.        Dose/Directions    metoprolol 25 MG 24 hr tablet   Commonly known as:  TOPROL-XL   Used for:  Nonrheumatic aortic valve stenosis   Started by:  Bella Chavis MD        Dose:  12.5 mg   Take 0.5 tablets (12.5 mg) by mouth daily   Quantity:  30 tablet   Refills:  3         Stop taking these medicines if you haven't already. Please contact your care team if you have questions.     metoprolol 25 " MG tablet   Commonly known as:  LOPRESSOR   Stopped by:  Bella Chavis MD                Where to get your medicines      These medications were sent to [id number] - [place], [place] - [address] AT [facility] [address], [facility] [address]     Phone:  [phone]     metoprolol 25 MG 24 hr tablet                Primary Care Provider Office Phone # Fax #    Danny Paige [phone]       [facility] [address]  [facility] [address]        Thank you!     Thank you for choosing [facility] [place]  for your care. Our goal is always to provide you with excellent care. Hearing back from our patients is one way we can continue to improve our services. Please take a few minutes to complete the written survey that you may receive in the mail after your visit with us. Thank you!             Your Updated Medication List - Protect others around you: Learn how to safely use, store and throw away your medicines at www.disposemymeds.org.          This list is accurate as of: 6/16/17  1:29 PM.  Always use your most recent med list.                   Brand Name Dispense Instructions for use    acetaminophen 500 MG tablet    TYLENOL     Take 1,000 mg by mouth every 6 hours as needed for mild pain       apixaban ANTICOAGULANT 5 MG tablet    ELIQUIS    180 tablet    Take 1 tablet (5 mg) by mouth 2 times daily       ASPIRIN PO      Take 81 mg by mouth every morning       B-12 2000 MCG Tabs      Take 1 tablet by mouth every morning       digoxin 125 MCG tablet    LANOXIN    30 tablet    Take 1 tablet (125 mcg) by mouth daily       gabapentin 300 MG capsule    NEURONTIN    60 capsule    Take 2 capsules (600 mg) by mouth every evening       metoprolol 25 MG 24 hr tablet    TOPROL-XL    30 tablet    Take 0.5 tablets (12.5 mg) by mouth daily       MIRALAX powder   Generic drug:  polyethylene  glycol      Take 17 g by mouth daily       multivitamin Tabs tablet      Take 1 tablet by mouth every morning       simvastatin 40 MG tablet    ZOCOR    90 tablet    Take 1 tablet (40 mg) by mouth At Bedtime

## 2017-06-17 NOTE — PROGRESS NOTES
REASON FOR VISIT:  Followup visit.      HISTORY OF PRESENT ILLNESS:  Mr. Ramos is a pleasant 71-year-old gentleman who comes in routine followup.  He has a history of stroke with visual loss in the right eye, SVT and eventual diagnosis of atrial fibrillation and flutter.  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  He has a history of acute disseminated encephalomyelitis, sleep apnea treated, vocal cord cancer in remission and monoclonal gammopathy of unknown significance with no bone marrow biopsy per patient preference.      He has also got moderate aortic stenosis, stable on last echo done yesterday.  He has mild mitral stenosis and preserved systolic function.  He has mild to moderate pulmonary hypertension.      Mr. Ramos underwent right and left heart catheterization in 07/2016 showing nonobstructive disease in the distal RCA and mid LAD with negative FFRs.      He feels that he has gotten a bit more winded and fatigued over the past month, perhaps mildly worse than when he saw his pulmonologist in April of this year.  The pulmonologist felt that there had been some resolution or improvement in his amiodarone-induced pulmonary toxicity and put a followup out 6 more months with spirometry at that time.  He has had no change in his echocardiogram from prior.  He has no chest discomfort on exertion, can get lightheaded with change in position, no PND or orthopnea, and chronic left lower extremity pedal edema.  He has no significant palpitations, no presyncope nor syncope.      He has atrial fibrillation on anticoagulation and rate control with digoxin and metoprolol.      ASSESSMENT AND PLAN:  Tex 71-year-old gentleman with moderate aortic stenosis, mild mitral stenosis, preserved systolic function and nonobstructive coronary disease on catheterization in 07/2016, with negative FFRs at that time.  He has had no change in his echocardiogram and only apparently some improvement from  the standpoint of pneumonitis related to his amiodarone use last year, however, notes that he is more fatigued than short of breath on exertion over the past month or two.        At this point, I would decrease his metoprolol to metoprolol XL 12.5 mg twice daily.      He has atrial fibrillation on anticoagulation and rate control with digoxin and metoprolol.  The patient's atrial fibrillation appears rate controlled.  We are going to check a digoxin level and a 24-hour Holter monitor to see if this could be also contributing to his shortness of breath.  I will decrease the metoprolol with his low blood pressures and lowish heart rate as mentioned to 12.5 mg of XL daily.  We will check a Lexiscan sestamibi as the lesions on catheterization were close to 60%.      Further recommendations will be pending the findings above.      Total time is 30 minutes, 25 in coordination of care and counseling.      Luís Chavis MD      cc:   Danny Paige MD    [address], [address]    [place] [address]         LUÍS CHAVIS MD             D: 2017 13:56   T: 2017 03:18   MT: COLLEEN      Name:     PHANI AVITIA   MRN:      [id number]        Account:      [id number]   :      1945           Service Date: 2017      Document: [id number]

## 2017-06-21 LAB
COPATH REPORT: NORMAL
COPATH REPORT: NORMAL

## 2017-06-28 ENCOUNTER — TELEPHONE (OUTPATIENT)
Dept: CARDIOLOGY | Facility: CLINIC | Age: 72
End: 2017-06-28

## 2017-06-28 ENCOUNTER — HOSPITAL ENCOUNTER (OUTPATIENT)
Dept: CARDIOLOGY | Facility: CLINIC | Age: 72
Discharge: HOME | End: 2017-06-28
Attending: INTERNAL MEDICINE | Admitting: INTERNAL MEDICINE
Payer: MEDICARE

## 2017-06-28 VITALS — SYSTOLIC BLOOD PRESSURE: 100 MMHG | DIASTOLIC BLOOD PRESSURE: 68 MMHG

## 2017-06-28 DIAGNOSIS — I35.0 NONRHEUMATIC AORTIC VALVE STENOSIS: ICD-10-CM

## 2017-06-28 DIAGNOSIS — I48.91 ATRIAL FIBRILLATION AND FLUTTER (H): ICD-10-CM

## 2017-06-28 DIAGNOSIS — R06.09 DYSPNEA ON EXERTION: ICD-10-CM

## 2017-06-28 DIAGNOSIS — I48.92 ATRIAL FIBRILLATION AND FLUTTER (H): ICD-10-CM

## 2017-06-28 PROCEDURE — A9502 TC99M TETROFOSMIN: HCPCS | Performed by: INTERNAL MEDICINE

## 2017-06-28 PROCEDURE — 34300033 ZZH RX 343: Performed by: INTERNAL MEDICINE

## 2017-06-28 PROCEDURE — 78452 HT MUSCLE IMAGE SPECT MULT: CPT | Mod: 26 | Performed by: INTERNAL MEDICINE

## 2017-06-28 PROCEDURE — 78452 HT MUSCLE IMAGE SPECT MULT: CPT

## 2017-06-28 PROCEDURE — 93018 CV STRESS TEST I&R ONLY: CPT | Performed by: INTERNAL MEDICINE

## 2017-06-28 PROCEDURE — 25000128 H RX IP 250 OP 636: Performed by: INTERNAL MEDICINE

## 2017-06-28 PROCEDURE — 93016 CV STRESS TEST SUPVJ ONLY: CPT | Performed by: INTERNAL MEDICINE

## 2017-06-28 RX ORDER — REGADENOSON 0.08 MG/ML
0.4 INJECTION, SOLUTION INTRAVENOUS ONCE
Status: COMPLETED | OUTPATIENT
Start: 2017-06-28 | End: 2017-06-28

## 2017-06-28 RX ORDER — DIGOXIN 125 MCG
125 TABLET ORAL DAILY
Qty: 30 TABLET | Refills: 11 | Status: SHIPPED | OUTPATIENT
Start: 2017-06-28 | End: 2018-05-11

## 2017-06-28 RX ORDER — ACYCLOVIR 200 MG/1
0-1 CAPSULE ORAL
Status: DISCONTINUED | OUTPATIENT
Start: 2017-06-28 | End: 2017-06-29 | Stop reason: HOSPADM

## 2017-06-28 RX ORDER — AMINOPHYLLINE 25 MG/ML
50-100 INJECTION, SOLUTION INTRAVENOUS
Status: DISCONTINUED | OUTPATIENT
Start: 2017-06-28 | End: 2017-06-29 | Stop reason: HOSPADM

## 2017-06-28 RX ORDER — ALBUTEROL SULFATE 90 UG/1
2 AEROSOL, METERED RESPIRATORY (INHALATION) EVERY 5 MIN PRN
Status: DISCONTINUED | OUTPATIENT
Start: 2017-06-28 | End: 2017-06-29 | Stop reason: HOSPADM

## 2017-06-28 RX ADMIN — TETROFOSMIN 9.14 MCI.: 1.38 INJECTION, POWDER, LYOPHILIZED, FOR SOLUTION INTRAVENOUS at 13:34

## 2017-06-28 RX ADMIN — REGADENOSON 0.4 MG: 0.08 INJECTION, SOLUTION INTRAVENOUS at 13:29

## 2017-06-28 RX ADMIN — TETROFOSMIN 3.2 MCI.: 1.38 INJECTION, POWDER, LYOPHILIZED, FOR SOLUTION INTRAVENOUS at 11:36

## 2017-06-28 NOTE — TELEPHONE ENCOUNTER
Reviewed lexiscan showing   1.  Myocardial perfusion imaging using single isotope technique demonstrated probably normal perfusion. However there is evidence of transient ischemic dilatation the ratio 1.5 which may represent balanced ischemia. Clinical correlation is recommended.   2. Gated images demonstrated normal LV cavity size and systolic function.  The left ventricular systolic function is 69%.  3. Compared to the prior study from July 21, 2016, similar findings are noted .    Attempted to call pt, left messages at home & mobile number for pt to call back. [place] RN

## 2017-06-29 ENCOUNTER — HOSPITAL ENCOUNTER (OUTPATIENT)
Dept: CARDIOLOGY | Facility: CLINIC | Age: 72
Discharge: HOME | End: 2017-06-29
Attending: INTERNAL MEDICINE | Admitting: INTERNAL MEDICINE
Payer: MEDICARE

## 2017-06-29 DIAGNOSIS — I35.0 NONRHEUMATIC AORTIC VALVE STENOSIS: ICD-10-CM

## 2017-06-29 DIAGNOSIS — R94.31 ABNORMAL ELECTROCARDIOGRAM: ICD-10-CM

## 2017-06-29 DIAGNOSIS — R06.09 DYSPNEA ON EXERTION: ICD-10-CM

## 2017-06-29 PROCEDURE — 93227 XTRNL ECG REC<48 HR R&I: CPT | Performed by: INTERNAL MEDICINE

## 2017-06-29 PROCEDURE — 93225 XTRNL ECG REC<48 HRS REC: CPT

## 2017-06-29 NOTE — TELEPHONE ENCOUNTER
"Per OV note from Dr. Chavis, 6/16/17 \"Tex 71-year-old gentleman with moderate aortic stenosis, mild mitral stenosis, preserved systolic function and nonobstructive coronary disease on catheterization in 07/2016, with negative FFRs at that time.  He has had no change in his echocardiogram and only apparently some improvement from the standpoint of pneumonitis related to his amiodarone use last year, however, notes that he is more fatigued than short of breath on exertion over the past month or two.         He has atrial fibrillation on anticoagulation and rate control with digoxin and metoprolol.  The patient's atrial fibrillation appears rate controlled.  We are going to check a digoxin level and a 24-hour Holter monitor to see if this could be also contributing to his shortness of breath.  I will decrease the metoprolol with his low blood pressures and lowish heart rate as mentioned to 12.5 mg of XL daily.  We will check a Lexiscan sestamibi as the lesions on catheterization were close to 60%.\"    \"Mr. Ramos underwent right and left heart catheterization in 07/2016 showing nonobstructive disease in the distal RCA and mid LAD with negative FFRs.\"    Lexiscan results available.    Reviewed lexiscan showing   1.  Myocardial perfusion imaging using single isotope technique demonstrated probably normal perfusion. However there is evidence of transient ischemic dilatation the ratio 1.5 which may represent balanced ischemia. Clinical correlation is recommended.   2. Gated images demonstrated normal LV cavity size and systolic function.  The left ventricular systolic function is 69%.  3. Compared to the prior study from July 21, 2016, similar findings are noted .      Call placed to patient.  Pt unavailable.  Discussed results of Lexiscan with patients wife Mariajose.  Mariajose states Efrem has not had any complaints of CP.  SOB has not changed in nature since OV with Dr. Chavis on 6/16/17. Mariajose " states pt did lots of walking yesterday without C/O CP or SOB but did feel fatigued.      24h holter to be placed today (6/29/17). Will route to Dr. Chavis for recommendations.

## 2017-06-30 NOTE — TELEPHONE ENCOUNTER
"Discussed results with Dr. Chavis. Per Dr. Chavis, \" we can offer him the option of an angiogram.\"  Pt to keep scheduled OV with [facility] 7/7/17 and discuss possibility of proceeding with angiogram at this time.      Call placed to patient to discuss results.  No answer, left VM.   "

## 2017-07-07 ENCOUNTER — OFFICE VISIT (OUTPATIENT)
Dept: CARDIOLOGY | Facility: CLINIC | Age: 72
End: 2017-07-07
Attending: INTERNAL MEDICINE
Payer: MEDICARE

## 2017-07-07 VITALS
SYSTOLIC BLOOD PRESSURE: 102 MMHG | HEIGHT: 69 IN | WEIGHT: 176 LBS | BODY MASS INDEX: 26.07 KG/M2 | DIASTOLIC BLOOD PRESSURE: 65 MMHG | HEART RATE: 79 BPM

## 2017-07-07 DIAGNOSIS — I25.119 CORONARY ARTERY DISEASE INVOLVING NATIVE CORONARY ARTERY OF NATIVE HEART WITH ANGINA PECTORIS (H): Primary | ICD-10-CM

## 2017-07-07 DIAGNOSIS — R06.09 DYSPNEA ON EXERTION: ICD-10-CM

## 2017-07-07 DIAGNOSIS — I48.0 PAROXYSMAL ATRIAL FIBRILLATION (H): ICD-10-CM

## 2017-07-07 DIAGNOSIS — E78.2 MIXED HYPERLIPIDEMIA: ICD-10-CM

## 2017-07-07 PROCEDURE — 99214 OFFICE O/P EST MOD 30 MIN: CPT | Performed by: NURSE PRACTITIONER

## 2017-07-07 RX ORDER — ATORVASTATIN CALCIUM 40 MG/1
40 TABLET, FILM COATED ORAL DAILY
Qty: 30 TABLET | Refills: 3 | Status: SHIPPED | OUTPATIENT
Start: 2017-07-07 | End: 2017-11-01

## 2017-07-07 NOTE — LETTER
7/7/2017    Danny Paige MD  [facility]   [address]  [facility] [address]    RE: Efrem Manuelwell       Dear Colleague,    I had the pleasure of seeing Efrem Ramos in the [facility].    HPI and Plan:   See dictation  3:19 PM  Mr. Ramos is a pleasant 71-year-old gentleman who comes in routine followup.  He has a history of stroke with visual loss in the right eye, SVT and eventual diagnosis of atrial fibrillation and flutter.  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  He has a history of acute disseminated encephalomyelitis, sleep apnea treated, vocal cord cancer in remission and monoclonal gammopathy of unknown significance. Bone biopsy was just very recently completed and results are unknown to the patient at this time. He follows with Dr. Aldana and has an appointment next month in follow up.        He has also got moderate aortic stenosis, stable on last echo done yesterday.  He has mild mitral stenosis and preserved systolic function.  He has mild to moderate pulmonary hypertension.       Mr. Ramos underwent right and left heart catheterization in 07/2016 showing nonobstructive disease in the distal RCA and mid LAD with negative FFRs.     He feels that he has gotten a bit more winded and fatigued over the past month, perhaps mildly worse than when he saw his pulmonologist in April of this year.  The pulmonologist felt that there had been some resolution or improvement in his amiodarone-induced pulmonary toxicity and put a followup out 6 more months with spirometry at that time.  He has had no change in his echocardiogram from prior.    Last saw Dr. Eid on June 16. At that time his metoprolol dosing was decreased to 12.5 mg twice daily. His digoxin level was checked he was sent for a 24-hour Holter monitor. He was also sent for a nuclear stress test. His Kevin level was within normal at 0.7. His Holter  monitor showed a rhythm of sinus rhythm. Average heart rate was 69 bpm with a minimum of 46 bpm and maximum overnight 98 bpm. He had 0 positive greater than 2 seconds. There were 274 isolated ventricular ectopic beats. He had one pair and 34 bigeminal events. There were 1704 isolated supraventricular premature beats. 17 were pairs and 6 or runs. The longest run was 6 beats which was also the fastest at a rate of 161 bpm. The patient pressed the event button 8 times during the recording time. The rhythm was sinus with heart rates ranging 64-89 bpm with occasional supraventricular ectopy. His nuclear stress test showed probable normal perfusion. However there was evidence of transient ischemic dilatation. The ratio was 1.5 her presenting possible balanced ischemia. These results were reviewed by Dr. Chavis who suggested we offer him an angiogram.     Mr. Ramos comes in the clinic today accompanied by his wife. In general he tells me he's doing okay. He feels that perhaps some of his symptoms are slightly better since reducing the dose of his metoprolol. He does however continue to have fatigue. At times he feels a little wobbly with a loss of balance. He falls or syncope. He denies any chest pain. His breathing does not seem to be particularly labored though he does not strain himself that often.    On exam this is a well-developed well-nourished male who appears his stated age. He is a bit frail appearing. He walks with a cane. Vital signs are stable.  HEENT: Normocephalic atraumatic without tenderness or involuntary movements. Face appears symmetric. Visual fields are full. EOMs intact.. Conjunctiva clear. Oral mucosa pink and moist.  Neck: Supple for range of motion no JVD no carotid bruit  Cardiac exam: S1-S2 regular rate and rhythm 2 to 3/6 systolic murmur  Lungs: Chest extension appears symmetric. Breath sounds are clear.  Abdomen: Soft nontender bowel sounds present  Extremities: Trace lower extremity  edema bilaterally.    Impression and plan  #1 history of nonobstructive coronary artery disease  Mr. Ramos is had some generalized complaints of fatigue and shortness of breath. He does not complain of chest pain per se. Nuclear stress test was concerning for possible balanced ischemia. T.i.d. was measured at 1.5 one previously a year ago was measured at 1.3. I talked to Mr. Ramos and his wife about pursuing coronary angiogram. I discussed with him and the procedure itself. We discussed the risks and benefits. Risks include but are not limited to bleeding hematoma contrast nephropathy vascular perforation heart attack stroke or death. They verbalize understanding. At this time Mr. Ramos is unable to commit to a decision. For now he would like to take some time to think about it. In the interim I would like to be more aggressive with treating his coronary disease. He is currently on simvastatin. Last LDL was 90. I would like to change his statin therapy to atorvastatin 40 mg daily. We'll recheck a fasting lipid panel and ALT in 6 weeks. At that time we can further discuss angiogram if he would like to pursue it. Otherwise if they make a decision prior to that I asked him to contact the office.    #2 history of initial fibrillation  Holter monitor was without periods of atrial fibrillation. It showed a primary rhythm of sinus rhythm. He did have occasional nonsustained superventricular tachycardia's. We'll continue with digoxin and metoprolol. He is anticoagulated on Eliquis.     #3 history of dyslipidemia.  As above were changing to a high potency statin. We'll recheck in 6 weeks.    #4 history of monoclonal gammopathy.  Recently underwent a bone marrow biopsy. Following with Dr. Aldana.     Again we'll plan to see Mr. Ramos in 6 weeks. He can call if he would like to discuss angiogram prior to that. Any other questions or concerns he can contact the office. Thank you for allowing me to participate in the care  of this patient.         Orders Placed This Encounter   Procedures     Lipid Profile     ALT     Follow-Up with Cardiac Advanced Practice Provider       Orders Placed This Encounter   Medications     atorvastatin (LIPITOR) 40 MG tablet     Sig: Take 1 tablet (40 mg) by mouth daily     Dispense:  30 tablet     Refill:  3       Medications Discontinued During This Encounter   Medication Reason     simvastatin (ZOCOR) 40 MG tablet          Encounter Diagnoses   Name Primary?     Nonrheumatic aortic valve stenosis      Dyspnea on exertion      Abnormal electrocardiogram       Coronary artery disease involving native coronary artery of native heart with angina pectoris (H) Yes       CURRENT MEDICATIONS:  Current Outpatient Prescriptions   Medication Sig Dispense Refill     atorvastatin (LIPITOR) 40 MG tablet Take 1 tablet (40 mg) by mouth daily 30 tablet 3     digoxin (LANOXIN) 125 MCG tablet Take 1 tablet (125 mcg) by mouth daily 30 tablet 11     metoprolol (TOPROL-XL) 25 MG 24 hr tablet Take 0.5 tablets (12.5 mg) by mouth daily 30 tablet 3     gabapentin (NEURONTIN) 300 MG capsule Take 2 capsules (600 mg) by mouth every evening 60 capsule 5     apixaban ANTICOAGULANT (ELIQUIS) 5 MG tablet Take 1 tablet (5 mg) by mouth 2 times daily 180 tablet 3     polyethylene glycol (MIRALAX) powder Take 17 g by mouth daily       acetaminophen (TYLENOL) 500 MG tablet Take 1,000 mg by mouth every 6 hours as needed for mild pain       multivitamin (OCUVITE) TABS Take 1 tablet by mouth every morning       ASPIRIN PO Take 81 mg by mouth every morning        Cyanocobalamin (B-12) 2000 MCG TABS Take 1 tablet by mouth every morning        ALLERGIES     Allergies   Allergen Reactions     Adhesive Tape Blisters     Amiodarone      Lasix [Furosemide] Other (See Comments)     Throat swelling, wheezing     Penicillins Unknown     Sulfa Drugs Difficulty breathing     PAST MEDICAL HISTORY:  Past Medical History:   Diagnosis Date     Atrial  fibrillation (H)      Cancer (H) vocal cord     Carpal tunnel syndrome     abstracted 7/3/02.     CVA (cerebral infarction) 5/5/2015     Demyelinating disease of central nervous system, unspecified (H)     abstracted 7/3/02.     Dyspnea      ENCEPHALOPATHY UNSPECIFIED  3/15/2005    acute diseminated encephalitis     Hyperlipidemia      Mixed hyperlipidemia 3/15/2005     Noninf gastroenterit NEC     abstracted 7/3/02.     Other and unspecified hyperlipidemia     abstracted 7/3/02.     Pneumonia 8/17/2016     Redundant colon     needs CT colonography     Shingles      SKIN DISORDERS NEC 3/15/2005     Sleep apnea        PAST SURGICAL HISTORY:  Past Surgical History:   Procedure Laterality Date     BIOPSY  brain 2002     BONE MARROW BIOPSY, BONE SPECIMEN, NEEDLE/TROCAR N/A 6/8/2017    Procedure: BIOPSY BONE MARROW;  UNILATERAL BONE MARROW BIOPSY (CONSCIOUS SEDATION) ;  Surgeon: Jamie Gonzales MD;  Location:  GI     C NONSPECIFIC PROCEDURE  as a child    T & A. abstracted 7/3/02.     C NONSPECIFIC PROCEDURE  early    CTR. abstracted 7/3/02.     HEAD & NECK SURGERY       ORTHOPEDIC SURGERY      left arm ulna reset after injury     THORACOSCOPIC WEDGE RESECTION LUNG Right 8/2/2016    Procedure: THORACOSCOPIC WEDGE RESECTION LUNG;  Surgeon: Abdelrahman Noriega MD;  Location:  OR     FAMILY HISTORY:  Family History   Problem Relation Age of Onset     Blood Disease Mother      Anemia     Cardiovascular Father      Cancer - colorectal Maternal Grandfather      Hypertension Brother      DIABETES Sister 5     Juvinile Diabetes passed at 36     Respiratory Other      Lung Cancer     SOCIAL HISTORY:  Social History     Social History     Marital status:      Spouse name: N/A     Number of children: N/A     Years of education: N/A     Social History Main Topics     Smoking status: Former Smoker     Packs/day: 1.00     Years: 30.00     Types: Cigarettes     Quit date: 7/26/2013     Smokeless tobacco: Never Used  "    Alcohol use 25.2 oz/week     42 Standard drinks or equivalent per week      Comment: 2 per day     Drug use: No     Sexual activity: Not Currently     Other Topics Concern     Caffeine Concern Yes     1 Coke occasionally      Special Diet No     Exercise No     Social History Narrative       Review of Systems:  Skin:  not assessed       Eyes:  Positive for glasses    ENT:  Negative      Respiratory:  Positive for dyspnea on exertion     Cardiovascular:    Positive for;lightheadedness;fatigue    Gastroenterology: Positive for poor appetite    Genitourinary:  Negative      Musculoskeletal:  Positive for joint pain right foot   Neurologic:  Positive for stroke;incoordination    Psychiatric:  Positive for sleep disturbances    Heme/Lymph/Imm:  not assessed      Endocrine:  Negative        Physical Exam:  Vitals: /65  Pulse 79  Ht 1.74 m (5' 8.5\")  Wt 79.8 kg (176 lb)  BMI 26.37 kg/m2    Constitutional:  cooperative, alert and oriented, well developed, well nourished, in no acute distress frail      Skin:  warm and dry to the touch, no apparent skin lesions or masses noted        Head:  normocephalic, no masses or lesions        Eyes:  pupils equal and round, conjunctivae and lids unremarkable, sclera white, no xanthalasma, EOMS intact, no nystagmus        ENT:  no pallor or cyanosis, dentition good        Neck:  carotid pulses are full and equal bilaterally;JVP normal;no carotid bruit        Chest:  clear to auscultation          Cardiac:               S1, S2 regular with 3/6 ESM LUSB    Abdomen:  abdomen soft;BS normoactive        Vascular: not assessed this visit                                        Extremities and Back:  no deformities, clubbing, cyanosis, erythema observed   bilateral LE edema;trace        Neurological:  affect appropriate, oriented to time, person and place;no gross motor deficits        Thank you for allowing me to participate in the care of your patient.    Sincerely,     Bella" FABI Chanel CNP     [facility]

## 2017-07-07 NOTE — PROGRESS NOTES
HPI and Plan:   See dictation  3:19 PM  Mr. Ramos is a pleasant 71-year-old gentleman who comes in routine followup.  He has a history of stroke with visual loss in the right eye, SVT and eventual diagnosis of atrial fibrillation and flutter.  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  He has a history of acute disseminated encephalomyelitis, sleep apnea treated, vocal cord cancer in remission and monoclonal gammopathy of unknown significance. Bone biopsy was just very recently completed and results are unknown to the patient at this time. He follows with Dr. Aldana and has an appointment next month in follow up.        He has also got moderate aortic stenosis, stable on last echo done yesterday.  He has mild mitral stenosis and preserved systolic function.  He has mild to moderate pulmonary hypertension.       Mr. Ramos underwent right and left heart catheterization in 07/2016 showing nonobstructive disease in the distal RCA and mid LAD with negative FFRs.     He feels that he has gotten a bit more winded and fatigued over the past month, perhaps mildly worse than when he saw his pulmonologist in April of this year.  The pulmonologist felt that there had been some resolution or improvement in his amiodarone-induced pulmonary toxicity and put a followup out 6 more months with spirometry at that time.  He has had no change in his echocardiogram from prior.    Last saw Dr. Eid on June 16. At that time his metoprolol dosing was decreased to 12.5 mg twice daily. His digoxin level was checked he was sent for a 24-hour Holter monitor. He was also sent for a nuclear stress test. His Kevin level was within normal at 0.7. His Holter monitor showed a rhythm of sinus rhythm. Average heart rate was 69 bpm with a minimum of 46 bpm and maximum overnight 98 bpm. He had 0 positive greater than 2 seconds. There were 274 isolated ventricular ectopic beats. He had one pair and 34 bigeminal events.  There were 1704 isolated supraventricular premature beats. 17 were pairs and 6 or runs. The longest run was 6 beats which was also the fastest at a rate of 161 bpm. The patient pressed the event button 8 times during the recording time. The rhythm was sinus with heart rates ranging 64-89 bpm with occasional supraventricular ectopy. His nuclear stress test showed probable normal perfusion. However there was evidence of transient ischemic dilatation. The ratio was 1.5 her presenting possible balanced ischemia. These results were reviewed by Dr. Chavis who suggested we offer him an angiogram.     Mr. Ramos comes in the clinic today accompanied by his wife. In general he tells me he's doing okay. He feels that perhaps some of his symptoms are slightly better since reducing the dose of his metoprolol. He does however continue to have fatigue. At times he feels a little wobbly with a loss of balance. He falls or syncope. He denies any chest pain. His breathing does not seem to be particularly labored though he does not strain himself that often.    On exam this is a well-developed well-nourished male who appears his stated age. He is a bit frail appearing. He walks with a cane. Vital signs are stable.  HEENT: Normocephalic atraumatic without tenderness or involuntary movements. Face appears symmetric. Visual fields are full. EOMs intact.. Conjunctiva clear. Oral mucosa pink and moist.  Neck: Supple for range of motion no JVD no carotid bruit  Cardiac exam: S1-S2 regular rate and rhythm 2 to 3/6 systolic murmur  Lungs: Chest extension appears symmetric. Breath sounds are clear.  Abdomen: Soft nontender bowel sounds present  Extremities: Trace lower extremity edema bilaterally.    Impression and plan  #1 history of nonobstructive coronary artery disease  Mr. Ramos is had some generalized complaints of fatigue and shortness of breath. He does not complain of chest pain per se. Nuclear stress test was concerning  for possible balanced ischemia. T.i.d. was measured at 1.5 one previously a year ago was measured at 1.3. I talked to Mr. Ramos and his wife about pursuing coronary angiogram. I discussed with him and the procedure itself. We discussed the risks and benefits. Risks include but are not limited to bleeding hematoma contrast nephropathy vascular perforation heart attack stroke or death. They verbalize understanding. At this time Mr. Ramos is unable to commit to a decision. For now he would like to take some time to think about it. In the interim I would like to be more aggressive with treating his coronary disease. He is currently on simvastatin. Last LDL was 90. I would like to change his statin therapy to atorvastatin 40 mg daily. We'll recheck a fasting lipid panel and ALT in 6 weeks. At that time we can further discuss angiogram if he would like to pursue it. Otherwise if they make a decision prior to that I asked him to contact the office.    #2 history of initial fibrillation  Holter monitor was without periods of atrial fibrillation. It showed a primary rhythm of sinus rhythm. He did have occasional nonsustained superventricular tachycardia's. We'll continue with digoxin and metoprolol. He is anticoagulated on Eliquis.     #3 history of dyslipidemia.  As above were changing to a high potency statin. We'll recheck in 6 weeks.    #4 history of monoclonal gammopathy.  Recently underwent a bone marrow biopsy. Following with Dr. Aldana.     Again we'll plan to see Mr. Ramos in 6 weeks. He can call if he would like to discuss angiogram prior to that. Any other questions or concerns he can contact the office. Thank you for allowing me to participate in the care of this patient.         Orders Placed This Encounter   Procedures     Lipid Profile     ALT     Follow-Up with Cardiac Advanced Practice Provider       Orders Placed This Encounter   Medications     atorvastatin (LIPITOR) 40 MG tablet     Sig: Take 1 tablet  (40 mg) by mouth daily     Dispense:  30 tablet     Refill:  3       Medications Discontinued During This Encounter   Medication Reason     simvastatin (ZOCOR) 40 MG tablet          Encounter Diagnoses   Name Primary?     Nonrheumatic aortic valve stenosis      Dyspnea on exertion      Abnormal electrocardiogram       Coronary artery disease involving native coronary artery of native heart with angina pectoris (H) Yes       CURRENT MEDICATIONS:  Current Outpatient Prescriptions   Medication Sig Dispense Refill     atorvastatin (LIPITOR) 40 MG tablet Take 1 tablet (40 mg) by mouth daily 30 tablet 3     digoxin (LANOXIN) 125 MCG tablet Take 1 tablet (125 mcg) by mouth daily 30 tablet 11     metoprolol (TOPROL-XL) 25 MG 24 hr tablet Take 0.5 tablets (12.5 mg) by mouth daily 30 tablet 3     gabapentin (NEURONTIN) 300 MG capsule Take 2 capsules (600 mg) by mouth every evening 60 capsule 5     apixaban ANTICOAGULANT (ELIQUIS) 5 MG tablet Take 1 tablet (5 mg) by mouth 2 times daily 180 tablet 3     polyethylene glycol (MIRALAX) powder Take 17 g by mouth daily       acetaminophen (TYLENOL) 500 MG tablet Take 1,000 mg by mouth every 6 hours as needed for mild pain       multivitamin (OCUVITE) TABS Take 1 tablet by mouth every morning       ASPIRIN PO Take 81 mg by mouth every morning        Cyanocobalamin (B-12) 2000 MCG TABS Take 1 tablet by mouth every morning          ALLERGIES     Allergies   Allergen Reactions     Adhesive Tape Blisters     Amiodarone      Lasix [Furosemide] Other (See Comments)     Throat swelling, wheezing     Penicillins Unknown     Sulfa Drugs Difficulty breathing       PAST MEDICAL HISTORY:  Past Medical History:   Diagnosis Date     Atrial fibrillation (H)      Cancer (H) vocal cord     Carpal tunnel syndrome     abstracted 7/3/02.     CVA (cerebral infarction) 5/5/2015     Demyelinating disease of central nervous system, unspecified (H)     abstracted 7/3/02.     Dyspnea      ENCEPHALOPATHY  UNSPECIFIED  3/15/2005    acute diseminated encephalitis     Hyperlipidemia      Mixed hyperlipidemia 3/15/2005     Noninf gastroenterit NEC     abstracted 7/3/02.     Other and unspecified hyperlipidemia     abstracted 7/3/02.     Pneumonia 8/17/2016     Redundant colon     needs CT colonography     Shingles      SKIN DISORDERS NEC 3/15/2005     Sleep apnea        PAST SURGICAL HISTORY:  Past Surgical History:   Procedure Laterality Date     BIOPSY  brain 2002     BONE MARROW BIOPSY, BONE SPECIMEN, NEEDLE/TROCAR N/A 6/8/2017    Procedure: BIOPSY BONE MARROW;  UNILATERAL BONE MARROW BIOPSY (CONSCIOUS SEDATION) ;  Surgeon: Jamie Gonzales MD;  Location:  GI     C NONSPECIFIC PROCEDURE  as a child    T & A. abstracted 7/3/02.     C NONSPECIFIC PROCEDURE  early    CTR. abstracted 7/3/02.     HEAD & NECK SURGERY       ORTHOPEDIC SURGERY      left arm ulna reset after injury     THORACOSCOPIC WEDGE RESECTION LUNG Right 8/2/2016    Procedure: THORACOSCOPIC WEDGE RESECTION LUNG;  Surgeon: Abdelrahman Noriega MD;  Location:  OR       FAMILY HISTORY:  Family History   Problem Relation Age of Onset     Blood Disease Mother      Anemia     Cardiovascular Father      Cancer - colorectal Maternal Grandfather      Hypertension Brother      DIABETES Sister 5     Juvinile Diabetes passed at 36     Respiratory Other      Lung Cancer       SOCIAL HISTORY:  Social History     Social History     Marital status:      Spouse name: N/A     Number of children: N/A     Years of education: N/A     Social History Main Topics     Smoking status: Former Smoker     Packs/day: 1.00     Years: 30.00     Types: Cigarettes     Quit date: 7/26/2013     Smokeless tobacco: Never Used     Alcohol use 25.2 oz/week     42 Standard drinks or equivalent per week      Comment: 2 per day     Drug use: No     Sexual activity: Not Currently     Other Topics Concern     Caffeine Concern Yes     1 Coke occasionally      Special Diet No      "Exercise No     Social History Narrative       Review of Systems:  Skin:  not assessed       Eyes:  Positive for glasses    ENT:  Negative      Respiratory:  Positive for dyspnea on exertion     Cardiovascular:    Positive for;lightheadedness;fatigue    Gastroenterology: Positive for poor appetite    Genitourinary:  Negative      Musculoskeletal:  Positive for joint pain right foot   Neurologic:  Positive for stroke;incoordination    Psychiatric:  Positive for sleep disturbances    Heme/Lymph/Imm:  not assessed      Endocrine:  Negative        Physical Exam:  Vitals: /65  Pulse 79  Ht 1.74 m (5' 8.5\")  Wt 79.8 kg (176 lb)  BMI 26.37 kg/m2    Constitutional:  cooperative, alert and oriented, well developed, well nourished, in no acute distress frail      Skin:  warm and dry to the touch, no apparent skin lesions or masses noted        Head:  normocephalic, no masses or lesions        Eyes:  pupils equal and round, conjunctivae and lids unremarkable, sclera white, no xanthalasma, EOMS intact, no nystagmus        ENT:  no pallor or cyanosis, dentition good        Neck:  carotid pulses are full and equal bilaterally;JVP normal;no carotid bruit        Chest:  clear to auscultation          Cardiac:               S1, S2 regular with 3/6 ESM LUSB    Abdomen:  abdomen soft;BS normoactive        Vascular: not assessed this visit                                        Extremities and Back:  no deformities, clubbing, cyanosis, erythema observed   bilateral LE edema;trace          Neurological:  affect appropriate, oriented to time, person and place;no gross motor deficits              CC  Bella Chavis MD   [facility]  [address]  [place] [address]              "

## 2017-07-07 NOTE — MR AVS SNAPSHOT
After Visit Summary   7/7/2017    Efrem Ramos    MRN: [id number]           Patient Information     Date Of Birth          1945        Visit Information        Provider Department      7/7/2017 1:10 PM Bella Alvarado APRN CNP [facility]        Today's Diagnoses     Coronary artery disease involving native coronary artery of native heart with angina pectoris (H)    -  1    Dyspnea on exertion        Mixed hyperlipidemia        Paroxysmal atrial fibrillation (H)          Care Instructions    Stop simvastatin  Start atorvastatin 40 mg daily  Follow up in 6 weeks          Follow-ups after your visit        Additional Services     Follow-Up with Cardiac Advanced Practice Provider                 Your next 10 appointments already scheduled     Aug 24, 2017 12:10 PM CDT   LAB with DANIELLE [facility]   [facility] ([facility])    [address] [place] [address] [address]  [facility] [id number] [phone]           Patient must bring picture ID.  Patient should be prepared to give a urine specimen  Please do not eat 10-12 hours before your appointment if you are coming in fasting for labs on lipids, cholesterol, or glucose (sugar).  Pregnant women should follow their Care Team instructions. Water with medications is okay. Do not drink coffee or other fluids.   If you have concerns about taking  your medications, please ask at office or if scheduling via [employer], send a message by clicking on Secure Messaging, Message Your Care Team.            Aug 24, 2017  1:10 PM CDT   Return Visit with FAIB Britton CNP   [facility] ([facility])    [address]  [facility] [id number] [phone]            Sep 20, 2017  1:00 PM CDT   Return Visit with  Oncology Nurse   [facility] ([facility])    [facility]  [place]  [address]  [place] [address]   [phone]            Sep 27, 2017  1:00 PM CDT   Return Visit with Shae Aldana MD   [facility] ([facility])    [facility] [place]  [address]  [place] [address]   [phone]              Future tests that were ordered for you today     Open Future Orders        Priority Expected Expires Ordered    Lipid Profile Routine 8/18/2017 7/7/2018 7/7/2017    ALT Routine 8/18/2017 7/7/2018 7/7/2017    Follow-Up with Cardiac Advanced Practice Provider Routine 8/18/2017 7/7/2018 7/7/2017            Who to contact     If you have questions or need follow up information about today's clinic visit or your schedule please contact [facility] [place] directly at [phone].  Normal or non-critical lab and imaging results will be communicated to you by [employer], letter or phone within 4 business days after the clinic has received the results. If you do not hear from us within 7 days, please contact the clinic through [employer] or phone. If you have a critical or abnormal lab result, we will notify you by phone as soon as possible.  Submit refill requests through [employer] or call your pharmacy and they will forward the refill request to us. Please allow 3 business days for your refill to be completed.          Additional Information About Your Visit        [employer] Information     [employer] gives you secure access to your electronic health record. If you see a primary care provider, you can also send messages to your care team and make appointments. If you have questions, please call your primary care clinic.  If you do not have a primary care provider, please call [phone] and they will assist you.        Care EveryWhere ID     This is your Care EveryWhere ID. This could be used by other organizations to access your [place] medical records  [id number]        Your Vitals Were      "Pulse Height BMI (Body Mass Index)             79 1.74 m (5' 8.5\") 26.37 kg/m2          Blood Pressure from Last 3 Encounters:   07/07/17 102/65   06/28/17 100/68   06/16/17 98/60    Weight from Last 3 Encounters:   07/07/17 79.8 kg (176 lb)   06/16/17 81.2 kg (179 lb)   05/31/17 82.9 kg (182 lb 12.8 oz)              We Performed the Following     Follow-Up with Cardiac Advanced Practice Provider          Today's Medication Changes          These changes are accurate as of: 7/7/17  3:31 PM.  If you have any questions, ask your nurse or doctor.               Start taking these medicines.        Dose/Directions    atorvastatin 40 MG tablet   Commonly known as:  LIPITOR   Used for:  Coronary artery disease involving native coronary artery of native heart with angina pectoris (H)   Started by:  Bella Alvarado APRN CNP        Dose:  40 mg   Take 1 tablet (40 mg) by mouth daily   Quantity:  30 tablet   Refills:  3         Stop taking these medicines if you haven't already. Please contact your care team if you have questions.     simvastatin 40 MG tablet   Commonly known as:  ZOCOR   Stopped by:  Bella Alvarado APRN CNP                Where to get your medicines      These medications were sent to [facility] [id number] [place] [address] [address]VE [address] AT [facility] [address]  [address] [facility] [address]     Phone:  [phone]     atorvastatin 40 MG tablet                Primary Care Provider Office Phone # Fax #    Danny Paige [phone]       [facility] [place] [address]  [facility] [address]        Equal Access to Services     MONA HARRIS AH: Tika Hodge, wafatimah luton, qasmiley kaalmada angelique, alfonzo lynch. So [facility] [phone].    ATENCIÓN: Si habla español, tiene a palomo disposición servicios gratuitos de asistencia lingüística. Llame al [phone].    We comply with " applicable federal civil rights laws and [place] laws. We do not discriminate on the basis of race, color, national origin, age, disability sex, sexual orientation or gender identity.            Thank you!     Thank you for choosing [facility] [place]  for your care. Our goal is always to provide you with excellent care. Hearing back from our patients is one way we can continue to improve our services. Please take a few minutes to complete the written survey that you may receive in the mail after your visit with us. Thank you!             Your Updated Medication List - Protect others around you: Learn how to safely use, store and throw away your medicines at www.disposemymeds.org.          This list is accurate as of: 7/7/17  3:31 PM.  Always use your most recent med list.                   Brand Name Dispense Instructions for use Diagnosis    acetaminophen 500 MG tablet    TYLENOL     Take 1,000 mg by mouth every 6 hours as needed for mild pain        apixaban ANTICOAGULANT 5 MG tablet    ELIQUIS    180 tablet    Take 1 tablet (5 mg) by mouth 2 times daily    Atrial fibrillation and flutter (H)       ASPIRIN PO      Take 81 mg by mouth every morning        atorvastatin 40 MG tablet    LIPITOR    30 tablet    Take 1 tablet (40 mg) by mouth daily    Coronary artery disease involving native coronary artery of native heart with angina pectoris (H)       B-12 2000 MCG Tabs      Take 1 tablet by mouth every morning        digoxin 125 MCG tablet    LANOXIN    30 tablet    Take 1 tablet (125 mcg) by mouth daily    Atrial fibrillation and flutter (H)       gabapentin 300 MG capsule    NEURONTIN    60 capsule    Take 2 capsules (600 mg) by mouth every evening    Restless legs syndrome (RLS)       metoprolol 25 MG 24 hr tablet    TOPROL-XL    30 tablet    Take 0.5 tablets (12.5 mg) by mouth daily    Nonrheumatic aortic valve stenosis       MIRALAX powder   Generic drug:  polyethylene glycol       Take 17 g by mouth daily        multivitamin Tabs tablet      Take 1 tablet by mouth every morning

## 2017-08-21 DIAGNOSIS — I48.91 ATRIAL FIBRILLATION (H): Primary | ICD-10-CM

## 2017-08-24 ENCOUNTER — OFFICE VISIT (OUTPATIENT)
Dept: CARDIOLOGY | Facility: CLINIC | Age: 72
End: 2017-08-24
Attending: NURSE PRACTITIONER
Payer: MEDICARE

## 2017-08-24 VITALS
DIASTOLIC BLOOD PRESSURE: 77 MMHG | HEIGHT: 69 IN | OXYGEN SATURATION: 98 % | WEIGHT: 175.1 LBS | BODY MASS INDEX: 25.93 KG/M2 | HEART RATE: 69 BPM | SYSTOLIC BLOOD PRESSURE: 126 MMHG

## 2017-08-24 DIAGNOSIS — I25.119 CORONARY ARTERY DISEASE INVOLVING NATIVE CORONARY ARTERY OF NATIVE HEART WITH ANGINA PECTORIS (H): Primary | ICD-10-CM

## 2017-08-24 DIAGNOSIS — I48.91 ATRIAL FIBRILLATION (H): ICD-10-CM

## 2017-08-24 DIAGNOSIS — I25.119 CORONARY ARTERY DISEASE INVOLVING NATIVE CORONARY ARTERY OF NATIVE HEART WITH ANGINA PECTORIS (H): ICD-10-CM

## 2017-08-24 DIAGNOSIS — R94.39 ABNORMAL CARDIOVASCULAR STRESS TEST: ICD-10-CM

## 2017-08-24 LAB
ALT SERPL W P-5'-P-CCNC: <5 U/L (ref 5–30)
ANION GAP SERPL CALCULATED.3IONS-SCNC: 10.1 MMOL/L (ref 6–17)
BUN SERPL-MCNC: 11 MG/DL (ref 7–30)
CALCIUM SERPL-MCNC: 9.2 MG/DL (ref 8.5–10.5)
CHLORIDE SERPL-SCNC: 104 MMOL/L (ref 98–107)
CHOLEST SERPL-MCNC: 134 MG/DL
CO2 SERPL-SCNC: 28 MMOL/L (ref 23–29)
CREAT SERPL-MCNC: 0.9 MG/DL (ref 0.7–1.3)
GFR SERPL CREATININE-BSD FRML MDRD: 83 ML/MIN/1.7M2
GLUCOSE SERPL-MCNC: 92 MG/DL (ref 70–105)
HDLC SERPL-MCNC: 56 MG/DL
LDLC SERPL CALC-MCNC: 64 MG/DL
NONHDLC SERPL-MCNC: 78 MG/DL
POTASSIUM SERPL-SCNC: 4.1 MMOL/L (ref 3.5–5.1)
SODIUM SERPL-SCNC: 138 MMOL/L (ref 136–145)
TRIGL SERPL-MCNC: 70 MG/DL

## 2017-08-24 PROCEDURE — 80048 BASIC METABOLIC PNL TOTAL CA: CPT | Performed by: NURSE PRACTITIONER

## 2017-08-24 PROCEDURE — 36415 COLL VENOUS BLD VENIPUNCTURE: CPT | Performed by: NURSE PRACTITIONER

## 2017-08-24 PROCEDURE — 99214 OFFICE O/P EST MOD 30 MIN: CPT | Performed by: NURSE PRACTITIONER

## 2017-08-24 PROCEDURE — 84460 ALANINE AMINO (ALT) (SGPT): CPT | Performed by: NURSE PRACTITIONER

## 2017-08-24 PROCEDURE — 80061 LIPID PANEL: CPT | Performed by: NURSE PRACTITIONER

## 2017-08-25 ENCOUNTER — CARE COORDINATION (OUTPATIENT)
Dept: CARDIOLOGY | Facility: CLINIC | Age: 72
End: 2017-08-25

## 2017-08-25 DIAGNOSIS — G25.81 RESTLESS LEGS SYNDROME (RLS): ICD-10-CM

## 2017-08-25 NOTE — PROGRESS NOTES
HPI and Plan:   See dictation  9:30 AM  [id number]    Orders Placed This Encounter   Procedures     Basic metabolic panel     Follow-Up with Cardiac Advanced Practice Provider       No orders of the defined types were placed in this encounter.      Medications Discontinued During This Encounter   Medication Reason     polyethylene glycol (MIRALAX) powder Therapy completed         Encounter Diagnoses   Name Primary?     Coronary artery disease involving native coronary artery of native heart with angina pectoris (H)      Mixed hyperlipidemia Yes     PVD (peripheral vascular disease) (H)      Mitral valve problem      Atrial fibrillation and flutter (H)      Abnormal cardiovascular stress test        CURRENT MEDICATIONS:  Current Outpatient Prescriptions   Medication Sig Dispense Refill     atorvastatin (LIPITOR) 40 MG tablet Take 1 tablet (40 mg) by mouth daily 30 tablet 3     digoxin (LANOXIN) 125 MCG tablet Take 1 tablet (125 mcg) by mouth daily 30 tablet 11     metoprolol (TOPROL-XL) 25 MG 24 hr tablet Take 0.5 tablets (12.5 mg) by mouth daily 30 tablet 3     gabapentin (NEURONTIN) 300 MG capsule Take 2 capsules (600 mg) by mouth every evening 60 capsule 5     apixaban ANTICOAGULANT (ELIQUIS) 5 MG tablet Take 1 tablet (5 mg) by mouth 2 times daily 180 tablet 3     acetaminophen (TYLENOL) 500 MG tablet Take 1,000 mg by mouth every 6 hours as needed for mild pain       multivitamin (OCUVITE) TABS Take 1 tablet by mouth every morning       ASPIRIN PO Take 81 mg by mouth every morning        Cyanocobalamin (B-12) 2000 MCG TABS Take 1 tablet by mouth every morning          ALLERGIES     Allergies   Allergen Reactions     Adhesive Tape Blisters     Amiodarone      Lasix [Furosemide] Other (See Comments)     Throat swelling, wheezing     Penicillins Unknown     Sulfa Drugs Difficulty breathing       PAST MEDICAL HISTORY:  Past Medical History:   Diagnosis Date     Atrial fibrillation (H)      Cancer (H) vocal cord     Carpal  tunnel syndrome     abstracted 7/3/02.     CVA (cerebral infarction) 5/5/2015     Demyelinating disease of central nervous system, unspecified (H)     abstracted 7/3/02.     Dyspnea      ENCEPHALOPATHY UNSPECIFIED  3/15/2005    acute diseminated encephalitis     Hyperlipidemia      Mixed hyperlipidemia 3/15/2005     Noninf gastroenterit NEC     abstracted 7/3/02.     Other and unspecified hyperlipidemia     abstracted 7/3/02.     Pneumonia 8/17/2016     Redundant colon     needs CT colonography     Shingles      SKIN DISORDERS NEC 3/15/2005     Sleep apnea        PAST SURGICAL HISTORY:  Past Surgical History:   Procedure Laterality Date     BIOPSY  brain 2002     BONE MARROW BIOPSY, BONE SPECIMEN, NEEDLE/TROCAR N/A 6/8/2017    Procedure: BIOPSY BONE MARROW;  UNILATERAL BONE MARROW BIOPSY (CONSCIOUS SEDATION) ;  Surgeon: Jaime Gonzales MD;  Location:  GI     C NONSPECIFIC PROCEDURE  as a child    T & A. abstracted 7/3/02.     C NONSPECIFIC PROCEDURE  early    CTR. abstracted 7/3/02.     HEAD & NECK SURGERY       ORTHOPEDIC SURGERY      left arm ulna reset after injury     THORACOSCOPIC WEDGE RESECTION LUNG Right 8/2/2016    Procedure: THORACOSCOPIC WEDGE RESECTION LUNG;  Surgeon: Abdelrahman Noriega MD;  Location:  OR       FAMILY HISTORY:  Family History   Problem Relation Age of Onset     Blood Disease Mother      Anemia     Cardiovascular Father      Cancer - colorectal Maternal Grandfather      Hypertension Brother      DIABETES Sister 5     Juvinile Diabetes passed at 36     Respiratory Other      Lung Cancer       SOCIAL HISTORY:  Social History     Social History     Marital status:      Spouse name: N/A     Number of children: N/A     Years of education: N/A     Social History Main Topics     Smoking status: Former Smoker     Packs/day: 1.00     Years: 30.00     Types: Cigarettes     Quit date: 7/26/2013     Smokeless tobacco: Never Used     Alcohol use 25.2 oz/week     42 Standard drinks  "or equivalent per week      Comment: 2 per day     Drug use: No     Sexual activity: Not Currently     Other Topics Concern     Caffeine Concern Yes     1 Coke occasionally      Special Diet No     Exercise No     Social History Narrative       Review of Systems:  Skin:  not assessed       Eyes:  Positive for glasses    ENT:  Negative      Respiratory:  Positive for dyspnea on exertion     Cardiovascular:    Positive for;lightheadedness;fatigue;dizziness;chest pain moves slow when standing.  Occassional chest pain.  Gastroenterology: Positive for poor appetite    Genitourinary:  Negative      Musculoskeletal:  Positive for joint pain right foot   Neurologic:  Positive for stroke;incoordination;headaches    Psychiatric:  Positive for sleep disturbances    Heme/Lymph/Imm:  not assessed      Endocrine:  Negative        Physical Exam:  Vitals: /77  Pulse 69  Ht 1.74 m (5' 8.5\")  Wt 79.4 kg (175 lb 1.6 oz)  SpO2 98%  BMI 26.23 kg/m2    Constitutional:  cooperative, alert and oriented, well developed, well nourished, in no acute distress        Skin:  warm and dry to the touch, no apparent skin lesions or masses noted        Head:  normocephalic, no masses or lesions        Eyes:  pupils equal and round, conjunctivae and lids unremarkable, sclera white, no xanthalasma, EOMS intact, no nystagmus        ENT:  no pallor or cyanosis, dentition good        Neck:  carotid pulses are full and equal bilaterally;JVP normal;no carotid bruit        Chest:  clear to auscultation          Cardiac:               S1, S2 regular with 3/6 ESM LUSB    Abdomen:  abdomen soft;BS normoactive        Vascular:   pulses below the femoral arteries are diminished                                      Extremities and Back:  no deformities, clubbing, cyanosis, erythema observed   bilateral LE edema;trace          Neurological:  affect appropriate, oriented to time, person and place;no gross motor deficits              MINNIE Mary " Christiano, APRN CNP  [address]  [place] [address]

## 2017-08-25 NOTE — PROGRESS NOTES
Called to Dr GRANT Aldana office at the request of SHAWN Alvarado NP to inquire if any contraindication to antiplatelet therapy as patient is undergoing an angiogram on 9/5/2017 for evaluation as of recently he has been complaining of more fatigue and shortness of breath; consideration will determine drug eluting stent (ISABEL) vs Bare Metal stent (BMS) if stenting is recommended at time of heart catheterization due to findings.    Seen by Dr Aldana 11/30/2016-  monoclonal gammopathy of unknown significance.  Given abnormal light chain ratio a bone marrow biopsy was recommended, patient declined in the past. He is being monitored with periodic exam and labs.    Messaged Dr Aldana.  Hemalatha Horvath RN 08/25/17 1:46 PM

## 2017-08-25 NOTE — PROGRESS NOTES
CHIEF COMPLAINT:  Followup.      HISTORY OF PRESENT ILLNESS:  Mr. Ramos is a pleasant 72-year-old patient who has seen Dr. Chavis.  He has a history of a stroke with visual loss in the right eye, SVT and eventual diagnosis of atrial fibrillation and flutter.  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  He has a history of acute disseminated encephalomyelitis, sleep apnea treated, vocal cord cancer in remission and monoclonal gammopathy of unknown significance.  He follows with Dr. Aldana for this.  He also has moderate aortic stenosis which is stable on his most recent echocardiogram.  He has mild mitral stenosis with preserved systolic function.  He has mild to moderate pulmonary hypertension.      In 2016, he had a right and left heart cath showing nonobstructive disease in the distal right coronary artery and mid-LAD with negative FFRs.      More recently he has been complaining of more fatigue and shortness of breath.  He is having a hard time following through with activities because he is not able to catch his breath.  He saw Dr. Chavis in June.  At that time she decreased his metoprolol dose.  He was sent for a 24-hour Holter monitor and a nuclear stress test.  His Holter monitor showed sinus rhythm with an average heart rate of 69 beats per minute, a minimum of 46 beats per minute and a maximum of 98 beats per minute.  He had 274 isolated ventricular ectopic beats.  He had 1 pair and 34 bigeminal events.  There were 1704 isolated supraventricular premature beats; 17 were paired and 6 were runs.  The longest run was 6 beats and the fastest was a rate of 161 beats per minute.  The nuclear stress test showed probable normal perfusion; however, there was evidence of transient ischemic dilatation.  When I discussed this with Dr. Chavis, she suggested I offer him an angiogram.      I then saw him in followup on 06/07.  At that time, he was having a hard time making any  definitive decisions on whether or not to pursue angiogram.  He comes back to the clinic today in followup.  He tells me his symptoms are persisting.  He still has significant fatigue and dyspnea on exertion.  He has occasional chest discomfort but cannot necessarily associate this with activity and is rather vague about a description.      LABORATORY DATA:  We did laboratory studies which showed a sodium of 138, potassium 4.1, BUN 11, creatinine 0.9, GFR 83.      PHYSICAL EXAMINATION:   GENERAL:  On exam, this is a well-developed, well-nourished male who appears his stated age.  He is cooperative and appropriate.   VITAL SIGNS:  Stable.   NEUROLOGIC:  He is intact.   HEENT:  Normocephalic, atraumatic without tenderness or involuntary movements.  Face appears symmetric.  Visual fields are full, EOMs intact.  Conjunctivae clear.  Oral mucosa is pink and moist.   NECK:  Supple, full range of motion.  Trachea appears midline.  No JVD, no carotid bruit.   CARDIOVASCULAR:  S1, S2, regular rate and rhythm, 3/6 systolic murmur.   LUNGS:  Chest expansion appears symmetric.  Breath sounds are clear.   ABDOMEN:  Soft, bowel sounds present.   EXTREMITIES:  Warm and dry with trace ankle edema bilaterally.      IMPRESSION AND PLAN:   1.  Dyspnea on exertion and fatigue, with recent stress test showing transient ischemic dilatation.  At this point, we did discuss further the coronary angiogram and he would like to pursue it.  We talked about the procedure itself.  We talked about the risks and benefits.  Risks include but are not limited to bleeding, hematoma, contrast nephropathy, vascular perforation, heart attack, stroke or death.  He verbalizes understanding of this and is willing to proceed.  He is on Eliquis for his history of atrial fibrillation and so he will need to hold this a couple of days prior to the procedure.  He will continue for now his metoprolol, atorvastatin, digoxin and aspirin.      He had a recent bone  marrow biopsy, though he has not followed up yet with Dr. Aldana on this.  I would like to get more information about those results prior to the procedure.  I will have my nurse get in touch with Dr. Aldana's staff for more information and to make sure that there will be no issues with use of antiplatelet therapy.      Mr. Ramos is aware if he has questions or concerns prior to his next visit, he can contact the office.      Thank you for allowing me to participate in the care of this patient.        Addendum: We did contact Dr. Aldana regarding the use of antiplatelet therapy, and we were told there is no contraindication to using it in this patient if stent placement is warranted.      FABI GEIGER CNP             D: 2017 09:39   T: 2017 11:40   MT: al      Name:     PHANI RAMOS   MRN:      [id number]        Account:      [id number]   :      1945           Service Date: 2017      Document: [id number]

## 2017-08-28 ENCOUNTER — CARE COORDINATION (OUTPATIENT)
Dept: CARDIOLOGY | Facility: CLINIC | Age: 72
End: 2017-08-28

## 2017-08-28 DIAGNOSIS — R94.39 ABNORMAL CARDIOVASCULAR STRESS TEST: Primary | ICD-10-CM

## 2017-08-28 RX ORDER — LIDOCAINE 40 MG/G
CREAM TOPICAL
Status: CANCELLED | OUTPATIENT
Start: 2017-08-28

## 2017-08-28 RX ORDER — ASPIRIN 81 MG/1
81 TABLET ORAL DAILY
Status: CANCELLED | OUTPATIENT
Start: 2017-08-28

## 2017-08-28 RX ORDER — SODIUM CHLORIDE 9 MG/ML
INJECTION, SOLUTION INTRAVENOUS CONTINUOUS
Status: CANCELLED | OUTPATIENT
Start: 2017-08-28

## 2017-08-28 RX ORDER — POTASSIUM CHLORIDE 1500 MG/1
20 TABLET, EXTENDED RELEASE ORAL
Status: CANCELLED | OUTPATIENT
Start: 2017-08-28

## 2017-08-28 NOTE — PROGRESS NOTES
"Pt scheduled for Left heart cath on 9/5/17.      Per OV note from LW, NP, \" ..He is on Eliquis for his history of atrial fibrillation and so he will need to hold this a couple of days prior to the procedure..\"    Orders entered for procedure.   "

## 2017-08-28 NOTE — TELEPHONE ENCOUNTER
There is no hematologic contraindication to coronary artery stenting and anti platelet therapy. Thanks

## 2017-08-31 ENCOUNTER — CARE COORDINATION (OUTPATIENT)
Dept: CARDIOLOGY | Facility: CLINIC | Age: 72
End: 2017-08-31

## 2017-08-31 NOTE — PROGRESS NOTES
Contacted patient to review pre-cath procedures: patient is scheduled for coronary angiogram (left )  on 9/5/17 . Patient's arrival time is 0900 and procedure time is 1100. Patient is aware to be NPO except for medications. Patient will hold the medication Eliquis x48h. Patient has arranged for transportation and 24 hour f/u care. Patient has no known contract dye allergy. Patient is not diabetic. Patient's renal function is WNL for procedure. Patient will continue daily aspirin dose 81mg. Order for procedure entered.

## 2017-09-05 ENCOUNTER — APPOINTMENT (OUTPATIENT)
Dept: CARDIOLOGY | Facility: CLINIC | Age: 72
End: 2017-09-05
Attending: NURSE PRACTITIONER
Payer: MEDICARE

## 2017-09-05 ENCOUNTER — HOSPITAL ENCOUNTER (OUTPATIENT)
Facility: CLINIC | Age: 72
Discharge: HOME | End: 2017-09-05
Attending: INTERNAL MEDICINE | Admitting: INTERNAL MEDICINE
Payer: MEDICARE

## 2017-09-05 VITALS
HEIGHT: 68 IN | BODY MASS INDEX: 26.52 KG/M2 | SYSTOLIC BLOOD PRESSURE: 91 MMHG | TEMPERATURE: 97 F | WEIGHT: 175 LBS | OXYGEN SATURATION: 94 % | HEART RATE: 62 BPM | DIASTOLIC BLOOD PRESSURE: 52 MMHG | RESPIRATION RATE: 16 BRPM

## 2017-09-05 DIAGNOSIS — R94.39 ABNORMAL CARDIOVASCULAR STRESS TEST: ICD-10-CM

## 2017-09-05 DIAGNOSIS — Z98.890 STATUS POST CORONARY ANGIOGRAM: Primary | ICD-10-CM

## 2017-09-05 LAB
ANION GAP SERPL CALCULATED.3IONS-SCNC: 8 MMOL/L (ref 3–14)
APTT PPP: 30 SEC (ref 22–37)
BUN SERPL-MCNC: 7 MG/DL (ref 7–30)
CALCIUM SERPL-MCNC: 8.4 MG/DL (ref 8.5–10.1)
CHLORIDE SERPL-SCNC: 108 MMOL/L (ref 94–109)
CO2 SERPL-SCNC: 24 MMOL/L (ref 20–32)
CREAT SERPL-MCNC: 0.78 MG/DL (ref 0.66–1.25)
ERYTHROCYTE [DISTWIDTH] IN BLOOD BY AUTOMATED COUNT: 14.7 % (ref 10–15)
GFR SERPL CREATININE-BSD FRML MDRD: >90 ML/MIN/1.7M2
GLUCOSE SERPL-MCNC: 92 MG/DL (ref 70–99)
HCT VFR BLD AUTO: 38.2 % (ref 40–53)
HGB BLD-MCNC: 13 G/DL (ref 13.3–17.7)
INR PPP: 1.02 (ref 0.86–1.14)
MCH RBC QN AUTO: 31.7 PG (ref 26.5–33)
MCHC RBC AUTO-ENTMCNC: 34 G/DL (ref 31.5–36.5)
MCV RBC AUTO: 93 FL (ref 78–100)
PLATELET # BLD AUTO: 302 10E9/L (ref 150–450)
POTASSIUM SERPL-SCNC: 3.8 MMOL/L (ref 3.4–5.3)
RBC # BLD AUTO: 4.1 10E12/L (ref 4.4–5.9)
SODIUM SERPL-SCNC: 140 MMOL/L (ref 133–144)
WBC # BLD AUTO: 10.9 10E9/L (ref 4–11)

## 2017-09-05 PROCEDURE — 27210914 ZZH SHEATH CR8

## 2017-09-05 PROCEDURE — B2111ZZ FLUOROSCOPY OF MULTIPLE CORONARY ARTERIES USING LOW OSMOLAR CONTRAST: ICD-10-PCS | Performed by: INTERNAL MEDICINE

## 2017-09-05 PROCEDURE — 93454 CORONARY ARTERY ANGIO S&I: CPT | Mod: 26 | Performed by: INTERNAL MEDICINE

## 2017-09-05 PROCEDURE — 93571 IV DOP VEL&/PRESS C FLO 1ST: CPT | Mod: 26 | Performed by: INTERNAL MEDICINE

## 2017-09-05 PROCEDURE — C1769 GUIDE WIRE: HCPCS

## 2017-09-05 PROCEDURE — 27210946 ZZH KIT HC TOTES DISP CR8

## 2017-09-05 PROCEDURE — 85610 PROTHROMBIN TIME: CPT | Performed by: INTERNAL MEDICINE

## 2017-09-05 PROCEDURE — A9270 NON-COVERED ITEM OR SERVICE: HCPCS | Mod: GY | Performed by: INTERNAL MEDICINE

## 2017-09-05 PROCEDURE — 93005 ELECTROCARDIOGRAM TRACING: CPT

## 2017-09-05 PROCEDURE — 40000852 ZZH STATISTIC HEART CATH LAB OR EP LAB

## 2017-09-05 PROCEDURE — 99153 MOD SED SAME PHYS/QHP EA: CPT

## 2017-09-05 PROCEDURE — 99152 MOD SED SAME PHYS/QHP 5/>YRS: CPT | Performed by: INTERNAL MEDICINE

## 2017-09-05 PROCEDURE — 93571 IV DOP VEL&/PRESS C FLO 1ST: CPT

## 2017-09-05 PROCEDURE — 99152 MOD SED SAME PHYS/QHP 5/>YRS: CPT

## 2017-09-05 PROCEDURE — 25000125 ZZHC RX 250: Performed by: INTERNAL MEDICINE

## 2017-09-05 PROCEDURE — 27210795 ZZH PAD DEFIB QUICK CR4

## 2017-09-05 PROCEDURE — 27210856 ZZH ACCESS HEART CATH CR2

## 2017-09-05 PROCEDURE — 36415 COLL VENOUS BLD VENIPUNCTURE: CPT | Performed by: INTERNAL MEDICINE

## 2017-09-05 PROCEDURE — 25000128 H RX IP 250 OP 636: Performed by: INTERNAL MEDICINE

## 2017-09-05 PROCEDURE — 80048 BASIC METABOLIC PNL TOTAL CA: CPT | Performed by: INTERNAL MEDICINE

## 2017-09-05 PROCEDURE — 25000132 ZZH RX MED GY IP 250 OP 250 PS 637: Mod: GY | Performed by: INTERNAL MEDICINE

## 2017-09-05 PROCEDURE — 40000065 ZZH STATISTIC EKG NON-CHARGEABLE

## 2017-09-05 PROCEDURE — 27210787 ZZH MANIFOLD CR2

## 2017-09-05 PROCEDURE — 93454 CORONARY ARTERY ANGIO S&I: CPT

## 2017-09-05 PROCEDURE — 4A033BC MEASUREMENT OF ARTERIAL PRESSURE, CORONARY, PERCUTANEOUS APPROACH: ICD-10-PCS | Performed by: INTERNAL MEDICINE

## 2017-09-05 PROCEDURE — 27211089 ZZH KIT ACIST INJECTOR CR3

## 2017-09-05 PROCEDURE — 85730 THROMBOPLASTIN TIME PARTIAL: CPT | Performed by: INTERNAL MEDICINE

## 2017-09-05 PROCEDURE — 93010 ELECTROCARDIOGRAM REPORT: CPT | Performed by: INTERNAL MEDICINE

## 2017-09-05 PROCEDURE — 85027 COMPLETE CBC AUTOMATED: CPT | Performed by: INTERNAL MEDICINE

## 2017-09-05 RX ORDER — LIDOCAINE 40 MG/G
CREAM TOPICAL
Status: DISCONTINUED | OUTPATIENT
Start: 2017-09-05 | End: 2017-09-05 | Stop reason: HOSPADM

## 2017-09-05 RX ORDER — LIDOCAINE HYDROCHLORIDE 10 MG/ML
30 INJECTION, SOLUTION EPIDURAL; INFILTRATION; INTRACAUDAL; PERINEURAL
Status: DISCONTINUED | OUTPATIENT
Start: 2017-09-05 | End: 2017-09-05 | Stop reason: HOSPADM

## 2017-09-05 RX ORDER — NIFEDIPINE 10 MG/1
10 CAPSULE ORAL
Status: DISCONTINUED | OUTPATIENT
Start: 2017-09-05 | End: 2017-09-05 | Stop reason: HOSPADM

## 2017-09-05 RX ORDER — LORAZEPAM 2 MG/ML
.5-2 INJECTION INTRAMUSCULAR EVERY 4 HOURS PRN
Status: DISCONTINUED | OUTPATIENT
Start: 2017-09-05 | End: 2017-09-05 | Stop reason: HOSPADM

## 2017-09-05 RX ORDER — NITROGLYCERIN 5 MG/ML
100-500 VIAL (ML) INTRAVENOUS
Status: COMPLETED | OUTPATIENT
Start: 2017-09-05 | End: 2017-09-05

## 2017-09-05 RX ORDER — CLOPIDOGREL BISULFATE 75 MG/1
75 TABLET ORAL
Status: DISCONTINUED | OUTPATIENT
Start: 2017-09-05 | End: 2017-09-05 | Stop reason: HOSPADM

## 2017-09-05 RX ORDER — POTASSIUM CHLORIDE 1500 MG/1
20 TABLET, EXTENDED RELEASE ORAL
Status: COMPLETED | OUTPATIENT
Start: 2017-09-05 | End: 2017-09-05

## 2017-09-05 RX ORDER — EPTIFIBATIDE 2 MG/ML
180 INJECTION, SOLUTION INTRAVENOUS EVERY 10 MIN PRN
Status: DISCONTINUED | OUTPATIENT
Start: 2017-09-05 | End: 2017-09-05 | Stop reason: HOSPADM

## 2017-09-05 RX ORDER — VERAPAMIL HYDROCHLORIDE 2.5 MG/ML
1-2.5 INJECTION, SOLUTION INTRAVENOUS
Status: COMPLETED | OUTPATIENT
Start: 2017-09-05 | End: 2017-09-05

## 2017-09-05 RX ORDER — PHENYLEPHRINE HCL IN 0.9% NACL 1 MG/10 ML
20-100 SYRINGE (ML) INTRAVENOUS
Status: DISCONTINUED | OUTPATIENT
Start: 2017-09-05 | End: 2017-09-05 | Stop reason: HOSPADM

## 2017-09-05 RX ORDER — NICARDIPINE HYDROCHLORIDE 2.5 MG/ML
100 INJECTION INTRAVENOUS
Status: DISCONTINUED | OUTPATIENT
Start: 2017-09-05 | End: 2017-09-05 | Stop reason: HOSPADM

## 2017-09-05 RX ORDER — DEXTROSE MONOHYDRATE 25 G/50ML
12.5-5 INJECTION, SOLUTION INTRAVENOUS EVERY 30 MIN PRN
Status: DISCONTINUED | OUTPATIENT
Start: 2017-09-05 | End: 2017-09-05 | Stop reason: HOSPADM

## 2017-09-05 RX ORDER — PROMETHAZINE HYDROCHLORIDE 25 MG/ML
6.25-25 INJECTION, SOLUTION INTRAMUSCULAR; INTRAVENOUS EVERY 4 HOURS PRN
Status: DISCONTINUED | OUTPATIENT
Start: 2017-09-05 | End: 2017-09-05 | Stop reason: HOSPADM

## 2017-09-05 RX ORDER — NITROGLYCERIN 5 MG/ML
100-200 VIAL (ML) INTRAVENOUS
Status: DISCONTINUED | OUTPATIENT
Start: 2017-09-05 | End: 2017-09-05 | Stop reason: HOSPADM

## 2017-09-05 RX ORDER — CLOPIDOGREL 300 MG/1
300-600 TABLET, FILM COATED ORAL
Status: DISCONTINUED | OUTPATIENT
Start: 2017-09-05 | End: 2017-09-05 | Stop reason: HOSPADM

## 2017-09-05 RX ORDER — FENTANYL CITRATE 50 UG/ML
25-50 INJECTION, SOLUTION INTRAMUSCULAR; INTRAVENOUS
Status: DISCONTINUED | OUTPATIENT
Start: 2017-09-05 | End: 2017-09-05 | Stop reason: HOSPADM

## 2017-09-05 RX ORDER — ENALAPRILAT 1.25 MG/ML
1.25-2.5 INJECTION INTRAVENOUS
Status: DISCONTINUED | OUTPATIENT
Start: 2017-09-05 | End: 2017-09-05 | Stop reason: HOSPADM

## 2017-09-05 RX ORDER — PROTAMINE SULFATE 10 MG/ML
25-100 INJECTION, SOLUTION INTRAVENOUS EVERY 5 MIN PRN
Status: DISCONTINUED | OUTPATIENT
Start: 2017-09-05 | End: 2017-09-05 | Stop reason: HOSPADM

## 2017-09-05 RX ORDER — METHYLPREDNISOLONE SODIUM SUCCINATE 125 MG/2ML
125 INJECTION, POWDER, LYOPHILIZED, FOR SOLUTION INTRAMUSCULAR; INTRAVENOUS
Status: DISCONTINUED | OUTPATIENT
Start: 2017-09-05 | End: 2017-09-05 | Stop reason: HOSPADM

## 2017-09-05 RX ORDER — DIPHENHYDRAMINE HYDROCHLORIDE 50 MG/ML
25-50 INJECTION INTRAMUSCULAR; INTRAVENOUS
Status: DISCONTINUED | OUTPATIENT
Start: 2017-09-05 | End: 2017-09-05 | Stop reason: HOSPADM

## 2017-09-05 RX ORDER — PRASUGREL 10 MG/1
10-60 TABLET, FILM COATED ORAL
Status: DISCONTINUED | OUTPATIENT
Start: 2017-09-05 | End: 2017-09-05 | Stop reason: HOSPADM

## 2017-09-05 RX ORDER — NITROGLYCERIN 20 MG/100ML
.07-2 INJECTION INTRAVENOUS CONTINUOUS PRN
Status: DISCONTINUED | OUTPATIENT
Start: 2017-09-05 | End: 2017-09-05 | Stop reason: HOSPADM

## 2017-09-05 RX ORDER — NALOXONE HYDROCHLORIDE 0.4 MG/ML
.1-.4 INJECTION, SOLUTION INTRAMUSCULAR; INTRAVENOUS; SUBCUTANEOUS
Status: DISCONTINUED | OUTPATIENT
Start: 2017-09-05 | End: 2017-09-05 | Stop reason: HOSPADM

## 2017-09-05 RX ORDER — DOBUTAMINE HYDROCHLORIDE 200 MG/100ML
2-20 INJECTION INTRAVENOUS CONTINUOUS PRN
Status: DISCONTINUED | OUTPATIENT
Start: 2017-09-05 | End: 2017-09-05 | Stop reason: HOSPADM

## 2017-09-05 RX ORDER — ASPIRIN 325 MG
325 TABLET ORAL
Status: DISCONTINUED | OUTPATIENT
Start: 2017-09-05 | End: 2017-09-05 | Stop reason: HOSPADM

## 2017-09-05 RX ORDER — LIDOCAINE HYDROCHLORIDE 10 MG/ML
1-10 INJECTION, SOLUTION EPIDURAL; INFILTRATION; INTRACAUDAL; PERINEURAL
Status: COMPLETED | OUTPATIENT
Start: 2017-09-05 | End: 2017-09-05

## 2017-09-05 RX ORDER — BUPIVACAINE HYDROCHLORIDE 2.5 MG/ML
1-10 INJECTION, SOLUTION EPIDURAL; INFILTRATION; INTRACAUDAL
Status: DISCONTINUED | OUTPATIENT
Start: 2017-09-05 | End: 2017-09-05 | Stop reason: HOSPADM

## 2017-09-05 RX ORDER — ACETAMINOPHEN 325 MG/1
325-650 TABLET ORAL EVERY 4 HOURS PRN
Status: DISCONTINUED | OUTPATIENT
Start: 2017-09-05 | End: 2017-09-05 | Stop reason: HOSPADM

## 2017-09-05 RX ORDER — ASPIRIN 81 MG/1
81 TABLET ORAL DAILY
Status: DISCONTINUED | OUTPATIENT
Start: 2017-09-05 | End: 2017-09-05 | Stop reason: HOSPADM

## 2017-09-05 RX ORDER — ATROPINE SULFATE 0.1 MG/ML
0.5 INJECTION INTRAVENOUS EVERY 5 MIN PRN
Status: DISCONTINUED | OUTPATIENT
Start: 2017-09-05 | End: 2017-09-05 | Stop reason: HOSPADM

## 2017-09-05 RX ORDER — PROTAMINE SULFATE 10 MG/ML
1-5 INJECTION, SOLUTION INTRAVENOUS
Status: DISCONTINUED | OUTPATIENT
Start: 2017-09-05 | End: 2017-09-05 | Stop reason: HOSPADM

## 2017-09-05 RX ORDER — EPTIFIBATIDE 2 MG/ML
2 INJECTION, SOLUTION INTRAVENOUS CONTINUOUS PRN
Status: DISCONTINUED | OUTPATIENT
Start: 2017-09-05 | End: 2017-09-05 | Stop reason: HOSPADM

## 2017-09-05 RX ORDER — SODIUM CHLORIDE 9 MG/ML
INJECTION, SOLUTION INTRAVENOUS CONTINUOUS
Status: DISCONTINUED | OUTPATIENT
Start: 2017-09-05 | End: 2017-09-05 | Stop reason: HOSPADM

## 2017-09-05 RX ORDER — FUROSEMIDE 10 MG/ML
20-100 INJECTION INTRAMUSCULAR; INTRAVENOUS
Status: DISCONTINUED | OUTPATIENT
Start: 2017-09-05 | End: 2017-09-05 | Stop reason: HOSPADM

## 2017-09-05 RX ORDER — METOPROLOL TARTRATE 1 MG/ML
5 INJECTION, SOLUTION INTRAVENOUS EVERY 5 MIN PRN
Status: DISCONTINUED | OUTPATIENT
Start: 2017-09-05 | End: 2017-09-05 | Stop reason: HOSPADM

## 2017-09-05 RX ORDER — IOPAMIDOL 755 MG/ML
50 INJECTION, SOLUTION INTRAVASCULAR ONCE
Status: COMPLETED | OUTPATIENT
Start: 2017-09-05 | End: 2017-09-05

## 2017-09-05 RX ORDER — NALOXONE HYDROCHLORIDE 0.4 MG/ML
0.4 INJECTION, SOLUTION INTRAMUSCULAR; INTRAVENOUS; SUBCUTANEOUS EVERY 5 MIN PRN
Status: DISCONTINUED | OUTPATIENT
Start: 2017-09-05 | End: 2017-09-05 | Stop reason: HOSPADM

## 2017-09-05 RX ORDER — POTASSIUM CHLORIDE 7.45 MG/ML
10 INJECTION INTRAVENOUS
Status: DISCONTINUED | OUTPATIENT
Start: 2017-09-05 | End: 2017-09-05 | Stop reason: HOSPADM

## 2017-09-05 RX ORDER — HYDRALAZINE HYDROCHLORIDE 20 MG/ML
10-20 INJECTION INTRAMUSCULAR; INTRAVENOUS
Status: DISCONTINUED | OUTPATIENT
Start: 2017-09-05 | End: 2017-09-05 | Stop reason: HOSPADM

## 2017-09-05 RX ORDER — ATROPINE SULFATE 0.1 MG/ML
.5-1 INJECTION INTRAVENOUS
Status: DISCONTINUED | OUTPATIENT
Start: 2017-09-05 | End: 2017-09-05 | Stop reason: HOSPADM

## 2017-09-05 RX ORDER — ONDANSETRON 2 MG/ML
4 INJECTION INTRAMUSCULAR; INTRAVENOUS EVERY 4 HOURS PRN
Status: DISCONTINUED | OUTPATIENT
Start: 2017-09-05 | End: 2017-09-05 | Stop reason: HOSPADM

## 2017-09-05 RX ORDER — FLUMAZENIL 0.1 MG/ML
0.2 INJECTION, SOLUTION INTRAVENOUS
Status: DISCONTINUED | OUTPATIENT
Start: 2017-09-05 | End: 2017-09-05 | Stop reason: HOSPADM

## 2017-09-05 RX ORDER — SODIUM NITROPRUSSIDE 25 MG/ML
100-200 INJECTION INTRAVENOUS
Status: DISCONTINUED | OUTPATIENT
Start: 2017-09-05 | End: 2017-09-05 | Stop reason: HOSPADM

## 2017-09-05 RX ORDER — POTASSIUM CHLORIDE 29.8 MG/ML
20 INJECTION INTRAVENOUS
Status: DISCONTINUED | OUTPATIENT
Start: 2017-09-05 | End: 2017-09-05 | Stop reason: HOSPADM

## 2017-09-05 RX ORDER — NALOXONE HYDROCHLORIDE 0.4 MG/ML
.2-.4 INJECTION, SOLUTION INTRAMUSCULAR; INTRAVENOUS; SUBCUTANEOUS
Status: DISCONTINUED | OUTPATIENT
Start: 2017-09-05 | End: 2017-09-05 | Stop reason: HOSPADM

## 2017-09-05 RX ORDER — DOPAMINE HYDROCHLORIDE 160 MG/100ML
2-20 INJECTION, SOLUTION INTRAVENOUS CONTINUOUS PRN
Status: DISCONTINUED | OUTPATIENT
Start: 2017-09-05 | End: 2017-09-05 | Stop reason: HOSPADM

## 2017-09-05 RX ORDER — NITROGLYCERIN 0.4 MG/1
0.4 TABLET SUBLINGUAL EVERY 5 MIN PRN
Status: DISCONTINUED | OUTPATIENT
Start: 2017-09-05 | End: 2017-09-05 | Stop reason: HOSPADM

## 2017-09-05 RX ORDER — ASPIRIN 81 MG/1
81-324 TABLET, CHEWABLE ORAL
Status: DISCONTINUED | OUTPATIENT
Start: 2017-09-05 | End: 2017-09-05 | Stop reason: HOSPADM

## 2017-09-05 RX ORDER — MORPHINE SULFATE 2 MG/ML
1-2 INJECTION, SOLUTION INTRAMUSCULAR; INTRAVENOUS EVERY 5 MIN PRN
Status: DISCONTINUED | OUTPATIENT
Start: 2017-09-05 | End: 2017-09-05 | Stop reason: HOSPADM

## 2017-09-05 RX ORDER — HYDROCODONE BITARTRATE AND ACETAMINOPHEN 5; 325 MG/1; MG/1
1-2 TABLET ORAL EVERY 4 HOURS PRN
Status: DISCONTINUED | OUTPATIENT
Start: 2017-09-05 | End: 2017-09-05 | Stop reason: HOSPADM

## 2017-09-05 RX ORDER — ADENOSINE 3 MG/ML
12-12000 INJECTION, SOLUTION INTRAVENOUS
Status: DISCONTINUED | OUTPATIENT
Start: 2017-09-05 | End: 2017-09-05 | Stop reason: HOSPADM

## 2017-09-05 RX ORDER — HEPARIN SODIUM 1000 [USP'U]/ML
1000-10000 INJECTION, SOLUTION INTRAVENOUS; SUBCUTANEOUS EVERY 5 MIN PRN
Status: DISCONTINUED | OUTPATIENT
Start: 2017-09-05 | End: 2017-09-05 | Stop reason: HOSPADM

## 2017-09-05 RX ADMIN — POTASSIUM CHLORIDE 20 MEQ: 1500 TABLET, EXTENDED RELEASE ORAL at 10:50

## 2017-09-05 RX ADMIN — MIDAZOLAM HYDROCHLORIDE 1 MG: 1 INJECTION, SOLUTION INTRAMUSCULAR; INTRAVENOUS at 12:10

## 2017-09-05 RX ADMIN — SODIUM CHLORIDE: 9 INJECTION, SOLUTION INTRAVENOUS at 10:01

## 2017-09-05 RX ADMIN — NITROGLYCERIN 200 MCG: 5 INJECTION, SOLUTION INTRAVENOUS at 11:48

## 2017-09-05 RX ADMIN — VERAPAMIL HYDROCHLORIDE 2.5 MG: 2.5 INJECTION, SOLUTION INTRAVENOUS at 11:46

## 2017-09-05 RX ADMIN — ASPIRIN 81 MG: 81 TABLET, COATED ORAL at 10:06

## 2017-09-05 RX ADMIN — IOPAMIDOL 50 ML: 755 INJECTION, SOLUTION INTRAVASCULAR at 12:15

## 2017-09-05 RX ADMIN — LIDOCAINE HYDROCHLORIDE 100 MG: 10 INJECTION, SOLUTION EPIDURAL; INFILTRATION; INTRACAUDAL; PERINEURAL at 11:46

## 2017-09-05 RX ADMIN — FENTANYL CITRATE 25 MCG: 50 INJECTION, SOLUTION INTRAMUSCULAR; INTRAVENOUS at 12:10

## 2017-09-05 RX ADMIN — Medication 5000 UNITS: at 11:49

## 2017-09-05 NOTE — PROGRESS NOTES
Up to BR to void, see flow sheet.  Care Suites Discharge Summary    Discharge Criteria:   Discharge Criteria met per MD orders: Yes.   Vital signs stable.     Pt demonstrates ability to ambulate safely: Yes.  (See discharge questionnaire for additional information)    Discharge instructions & education:   Discharge instructions reviewed with patient and wife. Patient verbalizes  understanding.     Medications:   Patient will be discharging on new medications- No. Patient verbalizes reason for use, start date, and side effects NA.    Items returned to patient:   Home and hospital acquired medications returned to patient NA   Listed belongings gathered and returned to patient: Yes    Patient discharged to home via w/c with wife.    Bella Salmeron

## 2017-09-05 NOTE — IP AVS SNAPSHOT
[place] [address]    [place] [address]    Phone:  [phone]                                       After Visit Summary   9/5/2017    Efrem Ramos    MRN: [id number]           After Visit Summary Signature Page     I have received my discharge instructions, and my questions have been answered. I have discussed any challenges I see with this plan with the nurse or doctor.    ..........................................................................................................................................  Patient/Patient Representative Signature      ..........................................................................................................................................  Patient Representative Print Name and Relationship to Patient    ..................................................               ................................................  Date                                            Time    ..........................................................................................................................................  Reviewed by Signature/Title    ...................................................              ..............................................  Date                                                            Time

## 2017-09-05 NOTE — IP AVS SNAPSHOT
MRN:[id number]                      After Visit Summary   9/5/2017    Efrem Ramos    MRN: [id number]           Visit Information        Department      9/5/2017  8:57 AM [facility]          Review of your medicines      UNREVIEWED medicines. Ask your doctor about these medicines        Dose / Directions    acetaminophen 500 MG tablet   Commonly known as:  TYLENOL        Dose:  1000 mg   Take 1,000 mg by mouth every 6 hours as needed for mild pain   Refills:  0       apixaban ANTICOAGULANT 5 MG tablet   Commonly known as:  ELIQUIS   Used for:  Atrial fibrillation and flutter (H)        Dose:  5 mg   Take 1 tablet (5 mg) by mouth 2 times daily   Quantity:  180 tablet   Refills:  3       ASPIRIN PO        Dose:  81 mg   Take 81 mg by mouth every morning   Refills:  0       atorvastatin 40 MG tablet   Commonly known as:  LIPITOR   Used for:  Coronary artery disease involving native coronary artery of native heart with angina pectoris (H)        Dose:  40 mg   Take 1 tablet (40 mg) by mouth daily   Quantity:  30 tablet   Refills:  3       B-12 2000 MCG Tabs        Dose:  1 tablet   Take 1 tablet by mouth every morning   Refills:  0       digoxin 125 MCG tablet   Commonly known as:  LANOXIN   Used for:  Atrial fibrillation and flutter (H)        Dose:  125 mcg   Take 1 tablet (125 mcg) by mouth daily   Quantity:  30 tablet   Refills:  11       gabapentin 300 MG capsule   Commonly known as:  NEURONTIN   Used for:  Restless legs syndrome (RLS)        Dose:  600 mg   Take 2 capsules (600 mg) by mouth every evening   Quantity:  60 capsule   Refills:  5       metoprolol 25 MG 24 hr tablet   Commonly known as:  TOPROL-XL   Used for:  Nonrheumatic aortic valve stenosis        Dose:  12.5 mg   Take 0.5 tablets (12.5 mg) by mouth daily   Quantity:  30 tablet   Refills:  3       multivitamin Tabs tablet        Dose:  1 tablet   Take 1 tablet by mouth 2 times daily   Refills:  0                 Protect others around you: Learn how to safely use, store and throw away your medicines at www.disposemymeds.org.         Follow-ups after your visit        Your next 10 appointments already scheduled     Sep 19, 2017  1:30 PM CDT   Return Visit with Cherise Raymond PA-C   [facility] [place] ([facility])    [address]  [place] [address]   [phone]            Sep 20, 2017  1:00 PM CDT   Return Visit with  Oncology Nurse   [facility] ([facility])    [facility]  [address]  [place] [address]   [phone]            Sep 27, 2017  1:00 PM CDT   Return Visit with Shae Aldana MD   [facility] ([facility])    [facility]  [address]  [place] [address]   [phone]               Care Instructions        After Care Instructions     Discharge Instructions - IF on Metformin (Glucophage or Glucovance) or Metformin containing medications       IF on Metformin (Glucophage or Glucovance) or Metformin containing medications , schedule a Basic Metabolic Panel at [facility] or Primary Clinic in 48 - 72 hours post procedure and PRIOR TO resuming the Metformin or Metformin containing medications.  Hold Metformin (Glucophage or Glucovance) or Metformin containing medications until after the Basic Metabolic Panel on the 2nd or 3rd day following the procedure.  May resume after blood draw is complete.                  Further instructions from your care team       Cardiac Angiogram Discharge Instructions - Radial    After you go home:      Have an adult stay with you until tomorrow.    Drink extra fluids for 2 days.    You may resume your normal diet.    No smoking       For 24 hours - due to the sedation you received:    Relax and take it easy.    Do NOT make any important or legal decisions.    Do NOT drive or operate  machines at home or at work.    Do NOT drink alcohol.    Care of Wrist Puncture Site:      For the first 24 hrs - check the puncture site every 1-2 hours while awake.    It is normal to have soreness at the puncture site and mild tingling in your hand for up to 3 days.    Remove the bandaid after 24 hours. If there is minor oozing, apply another bandaid and remove it after 12 hours.    You may shower tomorrow.  Do NOT take a bath, or use a hot tub or pool for at least 3 days. Do NOT scrub the site. Do not use lotion or powder near the puncture site.           Activity:        For 2 days:     do not use your hand or arm to support your weight (such as rising from a chair)     do not bend your wrist (such as lifting a garage door).    do not lift more than 5 pounds or exercise your arm (such as tennis, golf or bowling).    Do NOT do any heavy activity such as exercise, lifting, or straining.     Bleeding:      If you start bleeding from the site in your wrist, sit down and press firmly on/above the site for 10 minutes.     Once bleeding stops, keep arm still for 2 hours.     Call [facility] as soon as you can.       Call 911 right away if you have heavy bleeding or bleeding that does not stop.      Medicines:      If you are taking antiplatelet medications such as Plavix, Brilinta or Effient, do not stop taking it until you talk to your cardiologist.        If you are on Metformin (Glucophage) and your GFR (kidney function level) is >30, you may continue taking your Metformin.    If you are on Metformin (Glucophage) and your GFR (kidney function level) is <30, do not restart the Metformin for 48 hours after your procedure. Check with your primary care giver before restarting the Metformin to see if you need to have blood drawn to recheck your kidney function (GFR).    Take your medications, including blood thinners, unless your provider tells you not to.  If you take Coumadin (Warfarin), have your INR checked by your  "provider in  3-5 days. Call your clinic to schedule this.    If you have stopped any medicines, check with your provider about when to restart them.    Follow Up Appointments:      Follow up with [facility] Nurse Practitioner at [facility] of patient preference in 7-10 days.    Call the clinic if:      You have a large or growing hard lump around the site.    The site is red, swollen, hot or tender.    Blood or fluid is draining from the site.    You have chills or a fever greater than 101 F (38 C).    Your arm turns feels numb, cool or changes color.    You have hives, a rash or unusual itching.    Any questions or concerns.          [facility] at [place]:    [phone] [facility] (7 days a week)               Additional Information About Your Visit        MyChart Information     [employer] gives you secure access to your electronic health record. If you see a primary care provider, you can also send messages to your care team and make appointments. If you have questions, please call your primary care clinic.  If you do not have a primary care provider, please call [phone] and they will assist you.        Care EveryWhere ID     This is your Care EveryWhere ID. This could be used by other organizations to access your [place] medical records  [id number]        Your Vitals Were     Blood Pressure Pulse Temperature Respirations Height Weight    91/63 62 97  F (36.1  C) (Oral) 16 1.727 m (5' 8\") 79.4 kg (175 lb)    Pulse Oximetry BMI (Body Mass Index)                94% 26.61 kg/m2           Primary Care Provider Office Phone # Fax #    Danny Paige [phone]      Equal Access to Services     [facility]: Hadii angle Hodge, waaxda luqadaha, qaybta kaalmada alfonzo merino. So [facility] [phone].    ATENCIÓN: Si habla español, tiene a palomo disposición servicios gratuitos de asistencia lingüística. Llame al " [phone].    We comply with applicable federal civil rights laws and [place] laws. We do not discriminate on the basis of race, color, national origin, age, disability sex, sexual orientation or gender identity.            Thank you!     Thank you for choosing [facility] for your care. Our goal is always to provide you with excellent care. Hearing back from our patients is one way we can continue to improve our services. Please take a few minutes to complete the written survey that you may receive in the mail after you visit with us. Thank you!             Medication List: This is a list of all your medications and when to take them. Check marks below indicate your daily home schedule. Keep this list as a reference.      Medications           Morning Afternoon Evening Bedtime As Needed    acetaminophen 500 MG tablet   Commonly known as:  TYLENOL   Take 1,000 mg by mouth every 6 hours as needed for mild pain                                apixaban ANTICOAGULANT 5 MG tablet   Commonly known as:  ELIQUIS   Take 1 tablet (5 mg) by mouth 2 times daily                                ASPIRIN PO   Take 81 mg by mouth every morning   Last time this was given:  81 mg on 9/5/2017 10:06 AM                                atorvastatin 40 MG tablet   Commonly known as:  LIPITOR   Take 1 tablet (40 mg) by mouth daily                                B-12 2000 MCG Tabs   Take 1 tablet by mouth every morning                                digoxin 125 MCG tablet   Commonly known as:  LANOXIN   Take 1 tablet (125 mcg) by mouth daily                                gabapentin 300 MG capsule   Commonly known as:  NEURONTIN   Take 2 capsules (600 mg) by mouth every evening                                metoprolol 25 MG 24 hr tablet   Commonly known as:  TOPROL-XL   Take 0.5 tablets (12.5 mg) by mouth daily                                multivitamin Tabs tablet   Take 1 tablet by mouth 2 times daily

## 2017-09-05 NOTE — DISCHARGE INSTRUCTIONS
Cardiac Angiogram Discharge Instructions - Radial    After you go home:      Have an adult stay with you until tomorrow.    Drink extra fluids for 2 days.    You may resume your normal diet.    No smoking       For 24 hours - due to the sedation you received:    Relax and take it easy.    Do NOT make any important or legal decisions.    Do NOT drive or operate machines at home or at work.    Do NOT drink alcohol.    Care of Wrist Puncture Site:      For the first 24 hrs - check the puncture site every 1-2 hours while awake.    It is normal to have soreness at the puncture site and mild tingling in your hand for up to 3 days.    Remove the bandaid after 24 hours. If there is minor oozing, apply another bandaid and remove it after 12 hours.    You may shower tomorrow.  Do NOT take a bath, or use a hot tub or pool for at least 3 days. Do NOT scrub the site. Do not use lotion or powder near the puncture site.           Activity:        For 2 days:     do not use your hand or arm to support your weight (such as rising from a chair)     do not bend your wrist (such as lifting a garage door).    do not lift more than 5 pounds or exercise your arm (such as tennis, golf or bowling).    Do NOT do any heavy activity such as exercise, lifting, or straining.     Bleeding:      If you start bleeding from the site in your wrist, sit down and press firmly on/above the site for 10 minutes.     Once bleeding stops, keep arm still for 2 hours.     Call [facility] as soon as you can.       Call 911 right away if you have heavy bleeding or bleeding that does not stop.      Medicines:      If you are taking antiplatelet medications such as Plavix, Brilinta or Effient, do not stop taking it until you talk to your cardiologist.        If you are on Metformin (Glucophage) and your GFR (kidney function level) is >30, you may continue taking your Metformin.    If you are on Metformin (Glucophage) and your GFR (kidney function level) is <30,  do not restart the Metformin for 48 hours after your procedure. Check with your primary care giver before restarting the Metformin to see if you need to have blood drawn to recheck your kidney function (GFR).    Take your medications, including blood thinners, unless your provider tells you not to.  If you take Coumadin (Warfarin), have your INR checked by your provider in  3-5 days. Call your clinic to schedule this.    If you have stopped any medicines, check with your provider about when to restart them.    Follow Up Appointments:      Follow up with [facility] Nurse Practitioner at [facility] of patient preference in 7-10 days.    Call the clinic if:      You have a large or growing hard lump around the site.    The site is red, swollen, hot or tender.    Blood or fluid is draining from the site.    You have chills or a fever greater than 101 F (38 C).    Your arm turns feels numb, cool or changes color.    You have hives, a rash or unusual itching.    Any questions or concerns.          [facility] at [place]:    [phone] [facility] (7 days a week)

## 2017-09-07 LAB — INTERPRETATION ECG - MUSE: NORMAL

## 2017-09-13 DIAGNOSIS — G25.81 RESTLESS LEGS SYNDROME (RLS): ICD-10-CM

## 2017-09-13 RX ORDER — GABAPENTIN 300 MG/1
600 CAPSULE ORAL EVERY EVENING
Qty: 60 CAPSULE | Refills: 5 | Status: CANCELLED
Start: 2017-09-13

## 2017-09-13 RX ORDER — GABAPENTIN 300 MG/1
600 CAPSULE ORAL EVERY EVENING
Qty: 60 CAPSULE | Refills: 5 | Status: SHIPPED | OUTPATIENT
Start: 2017-09-13 | End: 2018-03-20

## 2017-09-19 ENCOUNTER — ONCOLOGY VISIT (OUTPATIENT)
Dept: ONCOLOGY | Facility: CLINIC | Age: 72
End: 2017-09-19
Attending: INTERNAL MEDICINE
Payer: MEDICARE

## 2017-09-19 ENCOUNTER — OFFICE VISIT (OUTPATIENT)
Dept: CARDIOLOGY | Facility: CLINIC | Age: 72
End: 2017-09-19
Attending: NURSE PRACTITIONER
Payer: MEDICARE

## 2017-09-19 ENCOUNTER — HOSPITAL ENCOUNTER (OUTPATIENT)
Facility: CLINIC | Age: 72
Setting detail: SPECIMEN
Discharge: HOME | End: 2017-09-19
Attending: INTERNAL MEDICINE | Admitting: INTERNAL MEDICINE
Payer: MEDICARE

## 2017-09-19 VITALS
HEIGHT: 69 IN | WEIGHT: 175.9 LBS | HEART RATE: 58 BPM | BODY MASS INDEX: 26.05 KG/M2 | DIASTOLIC BLOOD PRESSURE: 70 MMHG | SYSTOLIC BLOOD PRESSURE: 108 MMHG

## 2017-09-19 DIAGNOSIS — I48.0 PAROXYSMAL ATRIAL FIBRILLATION (H): ICD-10-CM

## 2017-09-19 DIAGNOSIS — D47.2 MGUS (MONOCLONAL GAMMOPATHY OF UNKNOWN SIGNIFICANCE): ICD-10-CM

## 2017-09-19 DIAGNOSIS — R06.09 DOE (DYSPNEA ON EXERTION): Primary | ICD-10-CM

## 2017-09-19 DIAGNOSIS — I25.119 CORONARY ARTERY DISEASE INVOLVING NATIVE CORONARY ARTERY OF NATIVE HEART WITH ANGINA PECTORIS (H): ICD-10-CM

## 2017-09-19 LAB
ALBUMIN SERPL-MCNC: 3 G/DL (ref 3.4–5)
ALP SERPL-CCNC: 127 U/L (ref 40–150)
ALT SERPL W P-5'-P-CCNC: 19 U/L (ref 0–70)
ANION GAP SERPL CALCULATED.3IONS-SCNC: 8 MMOL/L (ref 3–14)
AST SERPL W P-5'-P-CCNC: 17 U/L (ref 0–45)
BASOPHILS # BLD AUTO: 0.1 10E9/L (ref 0–0.2)
BASOPHILS NFR BLD AUTO: 0.5 %
BILIRUB SERPL-MCNC: 1 MG/DL (ref 0.2–1.3)
BUN SERPL-MCNC: 7 MG/DL (ref 7–30)
CALCIUM SERPL-MCNC: 8.7 MG/DL (ref 8.5–10.1)
CHLORIDE SERPL-SCNC: 108 MMOL/L (ref 94–109)
CO2 SERPL-SCNC: 26 MMOL/L (ref 20–32)
CREAT SERPL-MCNC: 0.81 MG/DL (ref 0.66–1.25)
DIFFERENTIAL METHOD BLD: ABNORMAL
EOSINOPHIL # BLD AUTO: 0.2 10E9/L (ref 0–0.7)
EOSINOPHIL NFR BLD AUTO: 2 %
ERYTHROCYTE [DISTWIDTH] IN BLOOD BY AUTOMATED COUNT: 15.2 % (ref 10–15)
GFR SERPL CREATININE-BSD FRML MDRD: >90 ML/MIN/1.7M2
GLUCOSE SERPL-MCNC: 82 MG/DL (ref 70–99)
HCT VFR BLD AUTO: 38.7 % (ref 40–53)
HGB BLD-MCNC: 12.8 G/DL (ref 13.3–17.7)
IMM GRANULOCYTES # BLD: 0.1 10E9/L (ref 0–0.4)
IMM GRANULOCYTES NFR BLD: 0.4 %
LYMPHOCYTES # BLD AUTO: 1.8 10E9/L (ref 0.8–5.3)
LYMPHOCYTES NFR BLD AUTO: 15.2 %
MCH RBC QN AUTO: 31 PG (ref 26.5–33)
MCHC RBC AUTO-ENTMCNC: 33.1 G/DL (ref 31.5–36.5)
MCV RBC AUTO: 94 FL (ref 78–100)
MONOCYTES # BLD AUTO: 1.2 10E9/L (ref 0–1.3)
MONOCYTES NFR BLD AUTO: 10.2 %
NEUTROPHILS # BLD AUTO: 8.4 10E9/L (ref 1.6–8.3)
NEUTROPHILS NFR BLD AUTO: 71.7 %
NRBC # BLD AUTO: 0 10*3/UL
NRBC BLD AUTO-RTO: 0 /100
PLATELET # BLD AUTO: 315 10E9/L (ref 150–450)
POTASSIUM SERPL-SCNC: 3.6 MMOL/L (ref 3.4–5.3)
PROT SERPL-MCNC: 7 G/DL (ref 6.8–8.8)
RBC # BLD AUTO: 4.13 10E12/L (ref 4.4–5.9)
SODIUM SERPL-SCNC: 142 MMOL/L (ref 133–144)
VIT B12 SERPL-MCNC: 948 PG/ML (ref 193–986)
WBC # BLD AUTO: 11.7 10E9/L (ref 4–11)

## 2017-09-19 PROCEDURE — 82607 VITAMIN B-12: CPT | Performed by: INTERNAL MEDICINE

## 2017-09-19 PROCEDURE — 36415 COLL VENOUS BLD VENIPUNCTURE: CPT

## 2017-09-19 PROCEDURE — 80053 COMPREHEN METABOLIC PANEL: CPT | Performed by: INTERNAL MEDICINE

## 2017-09-19 PROCEDURE — 86334 IMMUNOFIX E-PHORESIS SERUM: CPT | Performed by: INTERNAL MEDICINE

## 2017-09-19 PROCEDURE — 82784 ASSAY IGA/IGD/IGG/IGM EACH: CPT | Performed by: INTERNAL MEDICINE

## 2017-09-19 PROCEDURE — 00000402 ZZHCL STATISTIC TOTAL PROTEIN: Performed by: INTERNAL MEDICINE

## 2017-09-19 PROCEDURE — 83883 ASSAY NEPHELOMETRY NOT SPEC: CPT | Performed by: INTERNAL MEDICINE

## 2017-09-19 PROCEDURE — 84165 PROTEIN E-PHORESIS SERUM: CPT | Performed by: INTERNAL MEDICINE

## 2017-09-19 PROCEDURE — 99214 OFFICE O/P EST MOD 30 MIN: CPT | Performed by: PHYSICIAN ASSISTANT

## 2017-09-19 PROCEDURE — 85025 COMPLETE CBC W/AUTO DIFF WBC: CPT | Performed by: INTERNAL MEDICINE

## 2017-09-19 NOTE — PROGRESS NOTES
Medical Assistant Note:  Efrem Ramos presents today for LAB DRAW.    Patient seen by provider today: No.   present during visit today: Not Applicable.    Concerns: No Concerns.    Procedure:  Lab draw site: RAC, Needle type: BF, Gauge: 21G.  GAUZE AND COBAN APPLIED    Post Assessment:  Labs drawn without difficulty: Yes.    Discharge Plan:  Departure Mode: Ambulatory.    Face to Face Time: 4 MINUTES.    Mary Coburn

## 2017-09-19 NOTE — PROGRESS NOTES
HISTORY OF PRESENT ILLNESS:  I had the pleasure of seeing Efrem today when he came accompanied by his wife, Mariajose, for followup of his recent angiogram.  He is a very pleasant 72-year-old who unfortunately has suffered from significant shortness of breath.  He has a history of cerebrovascular accident with vision changes to the right eye and, atrial fibrillation and atrial flutter for which he had previously been on amiodarone.  This was stopped back in 06/2016 after a chest x-ray showing increased interstitial markings.  PFTs that previous January had been normal; however, there was significant concern for amiodarone toxicity and amiodarone was discontinued.  Subsequent CT scans have shown resolution of his interstitial disease, though he continues to complain of significant dyspnea.        Other history includes moderate aortic stenosis, ADEMS (acute disseminated encephalomyelitis) and untreated sleep apnea.  He also has monoclonal gammopathy of unknown significance and sees Dr. Aldana for this.  He has a history of peripheral vascular disease for which he sees Dr. Hoff.  Finally, he does also have COPD and sees Dr. 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      When he last saw Dr. Chavis, he continued to complain of significant shortness of breath and stress testing was recommended.  He then met with Bella Alvarado to review this stress test showing no evidence of ischemia but evidence of transient ischemic dilatation.  She also reviewed a 24-hour Holter monitor showing sinus rhythm with an average heart rate of 69 beats per minute.  He previously had his metoprolol dose reduced due to concerns that bradycardia was causing some of his shortness of breath.      After meeting with Bella, he opted to proceed with coronary angiography, and this was undertaken 09/05.      Dr. Chawla  proceed with coronary angiography via the right radial artery and showed really no significant change in his lesions since 2016.  He had a normal left main, mid LAD lesion 40%-50%, circumflex lesion of 20% and distal RCA lesion of 50%-60%.  IFR of that vessel was 0.95.      Therefore, it was felt that obstructive coronary disease is not the cause of his significant shortness of breath.      Workup in the past has included right and left heart catheterization in 2016 showing a mild nonobstructive disease as well as only mildly elevated pulmonary pressures and normal LV filling pressures.  It was not felt that these right heart catheterization abnormalities were significant enough to cause dyspnea/hypoxia.  He had severe mitral annular calcification with a mean gradient of 3.2/mild mitral stenosis at 61 beats per minute.  He had moderate trileaflet aortic sclerosis with moderate aortic stenosis noted.  Valve area was 1.3 cm2 and mean gradient was 19.9.  This had not changed dramatically since 11/2016.      PFTs done 04/2017 showed DLCO 53% predicted.  He sees Dr. Tineo at [facility] and has a followup appointment with him coming in the following weeks.     CBMP drawn at Dr. Aldana's office was basically normal.  CBC showed a slightly low hemoglobin at 12.8 g/dL. His WBC was slightly high at 11.7 with a mild elevation in ANC. He denies symptoms consistent with bacterial infection, including fever.    ASSESSMENT AND PLAN:     1.  Dyspnea.  Unfortunately, his dyspnea has persisted.  There was no evidence of significant CAD to be a cause of this, and although the patient was pleased to hear this, he is also disappointed that there has been no significant findings to account for this.  He has a normal ejection fraction, moderate aortic stenosis and does not appear fluid overloaded on exam with the exception of some lower extremity edema which is grossly unchanged.     In the past we did try a brief trial of Lasix  therapy, just for 3 days, and this was discontinued as his wife called back in 2016 and stated it really had not helped his shortness of breath at all.  We could certainly try that again if Dr. Valdez thinks that may be of benefit.     He actually sees Dr. Valdez on 10/09 and they will repeat spirometry.  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  She will speak with Dr. Tineo about this.     I would like to look at Dr. Tineo's note from October and contact the patient to determine followup.  It does not appear that he has any bradycardia or heart block to account for shortness of breath.  He has no significant lesions on coronary angiography and again does not appear to be fluid overloaded.  His hemoglobin is slightly low but stable at 12.8 g/dL and I do not have a cardiac reason for his significant shortness of breath.     He does have a history of amiodarone use and this was discontinued after infiltrative disease was found on his chest x-ray.  He had a biopsy and was placed on prednisone.       2.  Paroxysmal atrial fibrillation and atrial flutter.  He remains in a sinus rhythm on metoprolol and digoxin.  A rate control/anticoagulation strategy has been recommended as he had this potential lung disease due to amiodarone and had QT prolongation while on sotalol.      I will be in touch with him after I review Dr. Tineo's note and we will get him back in to see Dr. Chavis or myself.  As mentioned, we could always try a longer trial of diuretic therapy to see if that improves his symptoms at all, but feel it would be a bit of long shot.         BLESSING CLARK PA-C             D: 2017 14:27   T: 2017 16:36   MT: LONG      Name:     PHANI AVITIA   MRN:      [id number]        Account:      [id number]   :       1945           Service Date: 09/19/2017      Document: [id number]

## 2017-09-19 NOTE — PROGRESS NOTES
HPI and Plan:   See dictation #[id number]    No orders of the defined types were placed in this encounter.      Orders Placed This Encounter   Medications     Cyanocobalamin 2000 MCG TABS     Sig: Take 2,000 mcg by mouth every 7 days       Medications Discontinued During This Encounter   Medication Reason     Cyanocobalamin (B-12) 2000 MCG TABS Medication Reconciliation Clean Up       Encounter Diagnosis   Name Primary?     Coronary artery disease involving native coronary artery of native heart with angina pectoris (H)        CURRENT MEDICATIONS:  Current Outpatient Prescriptions   Medication Sig Dispense Refill     Cyanocobalamin 2000 MCG TABS Take 2,000 mcg by mouth every 7 days       gabapentin (NEURONTIN) 300 MG capsule Take 2 capsules (600 mg) by mouth every evening 60 capsule 5     atorvastatin (LIPITOR) 40 MG tablet Take 1 tablet (40 mg) by mouth daily 30 tablet 3     digoxin (LANOXIN) 125 MCG tablet Take 1 tablet (125 mcg) by mouth daily 30 tablet 11     metoprolol (TOPROL-XL) 25 MG 24 hr tablet Take 0.5 tablets (12.5 mg) by mouth daily 30 tablet 3     apixaban ANTICOAGULANT (ELIQUIS) 5 MG tablet Take 1 tablet (5 mg) by mouth 2 times daily 180 tablet 3     acetaminophen (TYLENOL) 500 MG tablet Take 1,000 mg by mouth every 6 hours as needed for mild pain       multivitamin (OCUVITE) TABS Take 1 tablet by mouth 2 times daily        ASPIRIN PO Take 81 mg by mouth every morning          ALLERGIES     Allergies   Allergen Reactions     Adhesive Tape Blisters     Amiodarone      Lasix [Furosemide] Other (See Comments)     Throat swelling, wheezing     Penicillins Unknown     Sulfa Drugs Difficulty breathing       PAST MEDICAL HISTORY:  Past Medical History:   Diagnosis Date     Atrial fibrillation (H)      Cancer (H) vocal cord     Carpal tunnel syndrome     abstracted 7/3/02.     CVA (cerebral infarction) 5/5/2015     Demyelinating disease of central nervous system, unspecified (H)     abstracted 7/3/02.     Dyspnea       ENCEPHALOPATHY UNSPECIFIED  3/15/2005    acute diseminated encephalitis     Hyperlipidemia      Mixed hyperlipidemia 3/15/2005     Noninf gastroenterit NEC     abstracted 7/3/02.     Other and unspecified hyperlipidemia     abstracted 7/3/02.     Pneumonia 8/17/2016     Redundant colon     needs CT colonography     S/P coronary angiogram 09/05/2017     Shingles      SKIN DISORDERS NEC 3/15/2005     Sleep apnea        PAST SURGICAL HISTORY:  Past Surgical History:   Procedure Laterality Date     BIOPSY  brain 2002     BONE MARROW BIOPSY, BONE SPECIMEN, NEEDLE/TROCAR N/A 6/8/2017    Procedure: BIOPSY BONE MARROW;  UNILATERAL BONE MARROW BIOPSY (CONSCIOUS SEDATION) ;  Surgeon: Jamie Gonzales MD;  Location:  GI     C NONSPECIFIC PROCEDURE  as a child    T & A. abstracted 7/3/02.     C NONSPECIFIC PROCEDURE  early    CTR. abstracted 7/3/02.     HEAD & NECK SURGERY       ORTHOPEDIC SURGERY      right arm ulna reset after injury     THORACOSCOPIC WEDGE RESECTION LUNG Right 8/2/2016    Procedure: THORACOSCOPIC WEDGE RESECTION LUNG;  Surgeon: Abdelrahman Noriega MD;  Location:  OR       FAMILY HISTORY:  Family History   Problem Relation Age of Onset     Blood Disease Mother      Anemia     Cardiovascular Father      Cancer - colorectal Maternal Grandfather      Hypertension Brother      DIABETES Sister 5     Juvinile Diabetes passed at 36     Respiratory Other      Lung Cancer       SOCIAL HISTORY:  Social History     Social History     Marital status:      Spouse name: N/A     Number of children: N/A     Years of education: N/A     Social History Main Topics     Smoking status: Former Smoker     Packs/day: 1.00     Years: 30.00     Types: Cigarettes     Quit date: 7/26/2013     Smokeless tobacco: Never Used     Alcohol use 25.2 oz/week     42 Standard drinks or equivalent per week      Comment: 2 beers per day     Drug use: No     Sexual activity: Not Currently     Other Topics Concern     Caffeine  "Concern Yes     1 Coke occasionally      Special Diet No     Exercise No     Social History Narrative       Review of Systems:  Skin:  not assessed rash pt reports rash from last EKG.   Eyes:  Positive for glasses pt reports partial vision in right eye due to stroke.    ENT:  Negative postnasal drainage;hearing loss    Respiratory:  Positive for dyspnea on exertion Pt reports SOB on occasion.     Cardiovascular:    Positive for;lightheadedness;fatigue;dizziness;chest pain moves slow when standing.  Occassional chest pain.  Gastroenterology: Positive for poor appetite    Genitourinary:  Negative      Musculoskeletal:  Positive for joint pain right foot   Neurologic:  Positive for stroke;incoordination;headaches Hx of stroke in 2015; occasion numbness reported.  Psychiatric:  Positive for sleep disturbances    Heme/Lymph/Imm:  not assessed   Vocal cord CA in 2013.  Endocrine:  Negative        Physical Exam:  Vitals: /70  Pulse 58  Ht 1.74 m (5' 8.5\")  Wt 79.8 kg (175 lb 14.4 oz)  BMI 26.36 kg/m2    Constitutional:  cooperative, alert and oriented, well developed, well nourished, in no acute distress        Skin:  warm and dry to the touch, no apparent skin lesions or masses noted        Head:  normocephalic, no masses or lesions        Eyes:  conjunctivae and lids unremarkable;sclera white        ENT:  no pallor or cyanosis, dentition good        Neck:  carotid pulses are full and equal bilaterally;JVP normal;no carotid bruit        Chest:  clear to auscultation          Cardiac: regular rhythm       systolic ejection murmur;grade 2;grade 3;LUSB          Abdomen:  abdomen soft;BS normoactive        Vascular:   pulses below the femoral arteries are diminished                             right radial bruit (-)   R radial artery without hematoma, bruising or tenderness    Extremities and Back:  no deformities, clubbing, cyanosis, erythema observed   bilateral LE edema;trace          Neurological:  affect " appropriate, oriented to time, person and place;no gross motor deficits

## 2017-09-19 NOTE — MR AVS SNAPSHOT
"              After Visit Summary   9/19/2017    Efrem Ramos    MRN: [id number]           Patient Information     Date Of Birth          1945        Visit Information        Provider Department      9/19/2017 1:30 PM Cherise Raymond PA-C [facility] AT [place]        Today's Diagnoses     Coronary artery disease involving native coronary artery of native heart with angina pectoris (H)          Care Instructions    1. Reviewed angiogram 9/5 showing no significant changes compared with last time.  No \"plumbing\" reason for continued breathing issues    2. Reviewed other labs drawn showing hemoglobin is still slightly low but stable, SLIGHTLY elevated white count (but no fever), normal liver, normal kidneys and normal electrolytes     3. As we discussed, will see what Dr. Tineo says and determine follow up    4. My nurse is Opal: [phone]          Follow-ups after your visit        Your next 10 appointments already scheduled     Sep 27, 2017  1:00 PM CDT   Return Visit with Shae Aldana MD   [facility] ([facility])    [facility]  [address]  [facility] [address] [phone]              Who to contact     If you have questions or need follow up information about today's clinic visit or your schedule please contact [facility] directly at [phone].  Normal or non-critical lab and imaging results will be communicated to you by MyChart, letter or phone within 4 business days after the clinic has received the results. If you do not hear from us within 7 days, please contact the clinic through MyChart or phone. If you have a critical or abnormal lab result, we will notify you by phone as soon as possible.  Submit refill requests through [employer] or call your pharmacy and they will forward the refill request to us. Please allow 3 business days for your refill " "to be completed.          Additional Information About Your Visit        [employer] Information     [employer] gives you secure access to your electronic health record. If you see a primary care provider, you can also send messages to your care team and make appointments. If you have questions, please call your primary care clinic.  If you do not have a primary care provider, please call [phone] and they will assist you.        Care EveryWhere ID     This is your Care EveryWhere ID. This could be used by other organizations to access your [facility] medical records  [id number]        Your Vitals Were     Pulse Height BMI (Body Mass Index)             58 1.74 m (5' 8.5\") 26.36 kg/m2          Blood Pressure from Last 3 Encounters:   09/19/17 108/70   09/05/17 91/52   08/24/17 126/77    Weight from Last 3 Encounters:   09/19/17 79.8 kg (175 lb 14.4 oz)   09/05/17 79.4 kg (175 lb)   08/24/17 79.4 kg (175 lb 1.6 oz)              We Performed the Following     Follow-Up with Cardiac Advanced Practice Provider        Primary Care Provider Office Phone # Fax #    Danny Paige [phone] [address] [address]        Equal Access to Services     JAMEEL HARRIS : Hadii aad ku hadasho Soomaali, waaxda luqadaha, qaybta kaalmada adeegyada, waxay idiin haymiryamn yris olvera . So [facility] [phone].    ATENCIÓN: Si habla español, tiene a palomo disposición servicios gratuitos de asistencia lingüística. Llame al [phone].    We comply with applicable federal civil rights laws and [place] laws. We do not discriminate on the basis of race, color, national origin, age, disability sex, sexual orientation or gender identity.            Thank you!     Thank you for choosing [facility] AT [place]  for your care. Our goal is always to provide you with excellent care. Hearing back from our patients is one way we can continue to improve our services. " Please take a few minutes to complete the written survey that you may receive in the mail after your visit with us. Thank you!             Your Updated Medication List - Protect others around you: Learn how to safely use, store and throw away your medicines at www.disposemymeds.org.          This list is accurate as of: 9/19/17  2:06 PM.  Always use your most recent med list.                   Brand Name Dispense Instructions for use Diagnosis    acetaminophen 500 MG tablet    TYLENOL     Take 1,000 mg by mouth every 6 hours as needed for mild pain        apixaban ANTICOAGULANT 5 MG tablet    ELIQUIS    180 tablet    Take 1 tablet (5 mg) by mouth 2 times daily    Atrial fibrillation and flutter (H)       ASPIRIN PO      Take 81 mg by mouth every morning        atorvastatin 40 MG tablet    LIPITOR    30 tablet    Take 1 tablet (40 mg) by mouth daily    Coronary artery disease involving native coronary artery of native heart with angina pectoris (H)       Cyanocobalamin 2000 MCG Tabs      Take 2,000 mcg by mouth every 7 days        digoxin 125 MCG tablet    LANOXIN    30 tablet    Take 1 tablet (125 mcg) by mouth daily    Atrial fibrillation and flutter (H)       gabapentin 300 MG capsule    NEURONTIN    60 capsule    Take 2 capsules (600 mg) by mouth every evening    Restless legs syndrome (RLS)       metoprolol 25 MG 24 hr tablet    TOPROL-XL    30 tablet    Take 0.5 tablets (12.5 mg) by mouth daily    Nonrheumatic aortic valve stenosis       multivitamin Tabs tablet      Take 1 tablet by mouth 2 times daily

## 2017-09-19 NOTE — MR AVS SNAPSHOT
After Visit Summary   9/19/2017    Efrem Ramos    MRN: [id number]           Patient Information     Date Of Birth          1945        Visit Information        Provider Department      9/19/2017 11:30 AM Nurse, Tram Oncology [facility]        Today's Diagnoses     MGUS (monoclonal gammopathy of unknown significance)        Coronary artery disease involving native coronary artery of native heart with angina pectoris (H)           Follow-ups after your visit        Your next 10 appointments already scheduled     Sep 19, 2017  1:30 PM CDT   Return Visit with Cherise Raymond PA-C   [facility] AT [place] ([facility])    [address]  [facility] [id number] [phone]            Sep 27, 2017  1:00 PM CDT   Return Visit with Shae Aldana MD   [facility] ([facility])    [facility]  [address]  [facility] [id number] [phone]              Who to contact     If you have questions or need follow up information about today's clinic visit or your schedule please contact [facility] directly at [phone].  Normal or non-critical lab and imaging results will be communicated to you by [employer], letter or phone within 4 business days after the clinic has received the results. If you do not hear from us within 7 days, please contact the clinic through [employer] or phone. If you have a critical or abnormal lab result, we will notify you by phone as soon as possible.  Submit refill requests through [email or web address] or call your pharmacy and they will forward the refill request to us. Please allow 3 business days for your refill to be completed.          Additional Information About Your Visit        [employer] Information     [email or web address] gives you secure access to your electronic health record. If you see a primary care provider, you can also send messages to your care team  and make appointments. If you have questions, please call your primary care clinic.  If you do not have a primary care provider, please call [phone] and they will assist you.        Care EveryWhere ID     This is your Care EveryWhere ID. This could be used by other organizations to access your [facility] medical records  [id number]         Blood Pressure from Last 3 Encounters:   09/05/17 91/52   08/24/17 126/77   07/07/17 102/65    Weight from Last 3 Encounters:   09/05/17 79.4 kg (175 lb)   08/24/17 79.4 kg (175 lb 1.6 oz)   07/07/17 79.8 kg (176 lb)              We Performed the Following     CBC with platelets differential     Comprehensive metabolic panel     Kappa and lambda light chain     Protein electrophoresis     Protein Immunofixation Serum     Vitamin B12        Primary Care Provider Office Phone # Fax #    Danny Paige [phone] [address]  [place] [address]        Equal Access to Services     JAMEEL HARRIS : Hadii aad ku hadasho Soomaali, waaxda luqadaha, qaybta kaalmada adeegyada, waxay trentonin haymiryamn yris olvera . So [facility] [phone].    ATENCIÓN: Si habla español, tiene a palomo disposición servicios gratuitos de asistencia lingüística. Ll[facility] [phone].    We comply with applicable federal civil rights laws and [place] laws. We do not discriminate on the basis of race, color, national origin, age, disability sex, sexual orientation or gender identity.            Thank you!     Thank you for choosing [facility]  for your care. Our goal is always to provide you with excellent care. Hearing back from our patients is one way we can continue to improve our services. Please take a few minutes to complete the written survey that you may receive in the mail after your visit with us. Thank you!             Your Updated Medication List - Protect others around you: Learn how to safely use, store and throw away your medicines at  www.disposemymeds.org.          This list is accurate as of: 9/19/17 12:46 PM.  Always use your most recent med list.                   Brand Name Dispense Instructions for use Diagnosis    acetaminophen 500 MG tablet    TYLENOL     Take 1,000 mg by mouth every 6 hours as needed for mild pain        apixaban ANTICOAGULANT 5 MG tablet    ELIQUIS    180 tablet    Take 1 tablet (5 mg) by mouth 2 times daily    Atrial fibrillation and flutter (H)       ASPIRIN PO      Take 81 mg by mouth every morning        atorvastatin 40 MG tablet    LIPITOR    30 tablet    Take 1 tablet (40 mg) by mouth daily    Coronary artery disease involving native coronary artery of native heart with angina pectoris (H)       B-12 2000 MCG Tabs      Take 1 tablet by mouth every morning        digoxin 125 MCG tablet    LANOXIN    30 tablet    Take 1 tablet (125 mcg) by mouth daily    Atrial fibrillation and flutter (H)       gabapentin 300 MG capsule    NEURONTIN    60 capsule    Take 2 capsules (600 mg) by mouth every evening    Restless legs syndrome (RLS)       metoprolol 25 MG 24 hr tablet    TOPROL-XL    30 tablet    Take 0.5 tablets (12.5 mg) by mouth daily    Nonrheumatic aortic valve stenosis       multivitamin Tabs tablet      Take 1 tablet by mouth 2 times daily

## 2017-09-19 NOTE — LETTER
9/19/2017    Danny Paige MD  [address]  [facility] [address]    RE: Efrem Ramos       Dear Colleague,    I had the pleasure of seeing Efrem Ramos in the [facility].    I had the pleasure of seeing Efrem today when he came accompanied by his wife, Mariajose, for followup of his recent angiogram.  He is a very pleasant 72-year-old who unfortunately has suffered from significant shortness of breath.  He has a history of cerebrovascular accident with vision changes to the right eye and, atrial fibrillation and atrial flutter for which he had previously been on amiodarone.  This was stopped back in 06/2016 after a chest x-ray showing increased interstitial markings.  PFTs that previous January had been normal; however, there was significant concern for amiodarone toxicity and amiodarone was discontinued.  Subsequent CT scans have shown resolution of his interstitial disease, though he continues to complain of significant dyspnea.        Other history includes moderate aortic stenosis, ADEMS (acute disseminated encephalomyelitis) and untreated sleep apnea.  He also has monoclonal gammopathy of unknown significance and sees Dr. Aldana for this.  He has a history of peripheral vascular disease for which he sees Dr. Hoff.  Finally, he does also have COPD and sees Dr. 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      When he last saw Dr. Chavis, he continued to complain of significant shortness of breath and stress testing was recommended.  He then met with Bella Alvarado to review this stress test showing no evidence of ischemia but evidence of transient ischemic dilatation.  She also reviewed a 24-hour Holter monitor showing sinus rhythm with an average heart rate of 69 beats per minute.  He previously had his metoprolol dose reduced due  to concerns that bradycardia was causing some of his shortness of breath.      After meeting with Bella, he opted to proceed with coronary angiography, and this was undertaken 09/05.      Dr. Chawla proceed with coronary angiography via the right radial artery and showed really no significant change in his lesions since 2016.  He had a normal left main, mid LAD lesion 40%-50%, circumflex lesion of 20% and distal RCA lesion of 50%-60%.  IFR of that vessel was 0.95.      Therefore, it was felt that obstructive coronary disease is not the cause of his significant shortness of breath.      Workup in the past has included right and left heart catheterization in 2016 showing a mild nonobstructive disease as well as only mildly elevated pulmonary pressures and normal LV filling pressures.  It was not felt that these right heart catheterization abnormalities were significant enough to cause dyspnea/hypoxia.  He had severe mitral annular calcification with a mean gradient of 3.2/mild mitral stenosis at 61 beats per minute.  He had moderate trileaflet aortic sclerosis with moderate aortic stenosis noted.  Valve area was 1.3 cm2 and mean gradient was 19.9.  This had not changed dramatically since 11/2016.      PFTs done 04/2017 showed DLCO 53% predicted.  He sees Dr. Tineo at [facility] and has a followup appointment with him coming in the following weeks.     CBMP drawn at Dr. Aldana's office was basically normal.  CBC showed a slightly low hemoglobin at 12.8 g/dL. His WBC was slightly high at 11.7 with a mild elevation in ANC. He denies symptoms consistent with bacterial infection, including fever.    Outpatient Encounter Prescriptions as of 9/19/2017   Medication Sig Dispense Refill     Cyanocobalamin 2000 MCG TABS Take 2,000 mcg by mouth every 7 days       gabapentin (NEURONTIN) 300 MG capsule Take 2 capsules (600 mg) by mouth every evening 60 capsule 5     [DISCONTINUED] atorvastatin (LIPITOR) 40 MG tablet Take 1 tablet  (40 mg) by mouth daily 30 tablet 3     digoxin (LANOXIN) 125 MCG tablet Take 1 tablet (125 mcg) by mouth daily 30 tablet 11     metoprolol (TOPROL-XL) 25 MG 24 hr tablet Take 0.5 tablets (12.5 mg) by mouth daily 30 tablet 3     apixaban ANTICOAGULANT (ELIQUIS) 5 MG tablet Take 1 tablet (5 mg) by mouth 2 times daily 180 tablet 3     acetaminophen (TYLENOL) 500 MG tablet Take 1,000 mg by mouth every 6 hours as needed for mild pain       multivitamin (OCUVITE) TABS Take 1 tablet by mouth 2 times daily        ASPIRIN PO Take 81 mg by mouth every morning        [DISCONTINUED] Cyanocobalamin (B-12) 2000 MCG TABS Take 1 tablet by mouth every morning        No facility-administered encounter medications on file as of 9/19/2017.      ASSESSMENT AND PLAN:     1.  Dyspnea.  Unfortunately, his dyspnea has persisted.  There was no evidence of significant CAD to be a cause of this, and although the patient was pleased to hear this, he is also disappointed that there has been no significant findings to account for this.  He has a normal ejection fraction, moderate aortic stenosis and does not appear fluid overloaded on exam with the exception of some lower extremity edema which is grossly unchanged.     In the past we did try a brief trial of Lasix therapy, just for 3 days, and this was discontinued as his wife called back in July 2016 and stated it really had not helped his shortness of breath at all.  We could certainly try that again if Dr. Valdez thinks that may be of benefit.     He actually sees Dr. Valdez on 10/09 and they will repeat spirometry.  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  She will speak with Dr. Tineo about this.     I would like to look at Dr. Tineo's note from October and contact the patient to determine followup.  It does not appear  that he has any bradycardia or heart block to account for shortness of breath.  He has no significant lesions on coronary angiography and again does not appear to be fluid overloaded.  His hemoglobin is slightly low but stable at 12.8 g/dL and I do not have a cardiac reason for his significant shortness of breath.     He does have a history of amiodarone use and this was discontinued after infiltrative disease was found on his chest x-ray.  He had a biopsy and was placed on prednisone.       2.  Paroxysmal atrial fibrillation and atrial flutter.  He remains in a sinus rhythm on metoprolol and digoxin.  A rate control/anticoagulation strategy has been recommended as he had this potential lung disease due to amiodarone and had QT prolongation while on sotalol.      I will be in touch with him after I review Dr. Tineo's note and we will get him back in to see Dr. Chavis or myself.  As mentioned, we could always try a longer trial of diuretic therapy to see if that improves his symptoms at all, but feel it would be a bit of long shot.     Again, thank you for allowing me to participate in the care of your patient.      Sincerely,    Cherise Raymond PA-C     [facility]

## 2017-09-20 LAB
ALBUMIN SERPL ELPH-MCNC: 3.3 G/DL (ref 3.7–5.1)
ALPHA1 GLOB SERPL ELPH-MCNC: 0.3 G/DL (ref 0.2–0.4)
ALPHA2 GLOB SERPL ELPH-MCNC: 0.8 G/DL (ref 0.5–0.9)
B-GLOBULIN SERPL ELPH-MCNC: 0.7 G/DL (ref 0.6–1)
GAMMA GLOB SERPL ELPH-MCNC: 1.6 G/DL (ref 0.7–1.6)
IGA SERPL-MCNC: 367 MG/DL (ref 70–380)
IGG SERPL-MCNC: 1390 MG/DL (ref 695–1620)
IGM SERPL-MCNC: 228 MG/DL (ref 60–265)
KAPPA LC UR-MCNC: 2.89 MG/DL (ref 0.33–1.94)
KAPPA LC/LAMBDA SER: 1.01 {RATIO} (ref 0.26–1.65)
LAMBDA LC SERPL-MCNC: 2.85 MG/DL (ref 0.57–2.63)
M PROTEIN SERPL ELPH-MCNC: 0.4 G/DL
PROT PATTERN SERPL ELPH-IMP: ABNORMAL
PROT PATTERN SERPL IFE-IMP: NORMAL

## 2017-09-27 ENCOUNTER — ONCOLOGY VISIT (OUTPATIENT)
Dept: ONCOLOGY | Facility: CLINIC | Age: 72
End: 2017-09-27
Attending: INTERNAL MEDICINE
Payer: MEDICARE

## 2017-09-27 VITALS
OXYGEN SATURATION: 100 % | RESPIRATION RATE: 16 BRPM | HEART RATE: 77 BPM | BODY MASS INDEX: 25.98 KG/M2 | DIASTOLIC BLOOD PRESSURE: 66 MMHG | WEIGHT: 173.4 LBS | TEMPERATURE: 98.1 F | SYSTOLIC BLOOD PRESSURE: 104 MMHG

## 2017-09-27 DIAGNOSIS — D47.2 MGUS (MONOCLONAL GAMMOPATHY OF UNKNOWN SIGNIFICANCE): Primary | ICD-10-CM

## 2017-09-27 PROCEDURE — 99213 OFFICE O/P EST LOW 20 MIN: CPT | Performed by: INTERNAL MEDICINE

## 2017-09-27 PROCEDURE — 99211 OFF/OP EST MAY X REQ PHY/QHP: CPT

## 2017-09-27 ASSESSMENT — PAIN SCALES - GENERAL: PAINLEVEL: NO PAIN (0)

## 2017-09-27 NOTE — PROGRESS NOTES
"Oncology Rooming Note    September 27, 2017 1:10 PM   Efrem Ramos is a 72 year old male who presents for:    Chief Complaint   Patient presents with     Oncology Clinic Visit     Vocal cord cancer     Initial Vitals: /66 (BP Location: Left arm, Patient Position: Sitting, Cuff Size: Adult Large)  Pulse 77  Temp 98.1  F (36.7  C) (Oral)  Resp 16  Wt 78.7 kg (173 lb 6.4 oz)  SpO2 100%  BMI 25.98 kg/m2 Estimated body mass index is 25.98 kg/(m^2) as calculated from the following:    Height as of 9/19/17: 1.74 m (5' 8.5\").    Weight as of this encounter: 78.7 kg (173 lb 6.4 oz). Body surface area is 1.95 meters squared.  No Pain (0) Comment: Data Unavailable   No LMP for male patient.  Allergies reviewed: Yes  Medications reviewed: Yes    Medications: Medication refills not needed today.  Pharmacy name entered into "[employer]:    [facility] [id number] [place] [address] [address] AT [address] [facility] [facility] [address]  [facility] [address] [facility] [address] AT [address] [place] [address]    Clinical concerns: None               4 minutes for nursing intake (face to face time)     Dotty Batista MA    DISCHARGE PLAN:  Next appointments: See patient instruction section.  Future appointments scheduled bu JOSELITO Storm  Departure Mode: Ambulatory  Accompanied by: wife  5 minutes for nursing discharge (face to face time)   Dotty Batista MA      1. Labs before next visit- CBC diff, CMP, IFXN, SPEP, light chain.- Scheduled by JOSELITO Storm  2.  RTC MD 6 months- scheduled by JOSELITO Storm  3- Continue B12 2000 mcg once a week--oral      03/21/2018 1:00 pm Labs at Dr. Aldana's office     03/28/2018 1:00 pm Follow up Dr. Aldana       3/21/2018 Wed  1:00 PM  1:00 P 15 [facility] [[id number]]  ONCOLOGY NURSE [[id number]] RETURN [177] Peripheral labs prior to exam- LW       3/28/2018 Wed  1:00 PM  1:00 P 15 [facility] [[id number]] CASSIDY ALDANA [[id number]] RETURN [547] 6 month " follow up MGUS labs prior

## 2017-09-27 NOTE — PROGRESS NOTES
[facility]  HEMATOLOGY ONCOLOGY    HEMATOLOGY FOLLOWUP NOTE      DIAGNOSES:   1.  Monoclonal gammopathy of unknown significance.   2.  Anemia.   3.  History of Vocal cord cancer.      TREATMENT:     1. Vitamin B12 2000 mcg p.o. daily.      SUBJECTIVE:  Patient comes in for followup today. He has been feeling well. No new complaints today.      REVIEW OF SYSTEMS:  A complete review of systems was performed and found to be negative other than pertinent positives mentioned in History of Present Illness.     Past medical, social histories reviewed.    Meds- Reviewed.     PHYSICAL EXAMINATION:   VITAL SIGNS:  /66 (BP Location: Left arm, Patient Position: Sitting, Cuff Size: Adult Large)  Pulse 77  Temp 98.1  F (36.7  C) (Oral)  Resp 16  Wt 78.7 kg (173 lb 6.4 oz)  SpO2 100%  BMI 25.98 kg/m2  CONSTITUTIONAL: Sitting comfortably.   HEENT: Pupils are equal. Oropharynx is clear.   NECK: No cervical or supraclavicular lymphadenopathy.   RESPIRATORY: Clear bilaterally.   CARD/VASC: S1, S2, regular.   GI: Soft, nontender, nondistended, no hepatosplenomegaly.   MUSKULOSKELETAL: Warm, well perfused.   NEUROLOGIC: Alert, awake.   INTEGUMENT: No rash.   LYMPHATICS: No edema.   PSYCH: Mood and affect was normal.     LABORATORY DATA AND IMAGING REVIEWED DURING THIS VISIT:  Recent Labs   Lab Test  09/19/17   1144  09/05/17   0950   NA  142  140   POTASSIUM  3.6  3.8   CHLORIDE  108  108   CO2  26  24   ANIONGAP  8  8   BUN  7  7   CR  0.81  0.78   GLC  82  92   ULISSES  8.7  8.4*     Recent Labs   Lab Test  09/19/17   1144  09/05/17   0950  06/08/17   1120  05/23/17   1311   WBC  11.7*  10.9  11.5*  10.6   HGB  12.8*  13.0*  12.5*  13.5   PLT  315  302  338  296   MCV  94  93  96  96   NEUTROPHIL  71.7   --   69.7  70.4     Recent Labs   Lab Test  09/19/17   1144  08/24/17   1206  05/23/17   1311  11/21/16   1314   09/16/13   1350  08/20/13   1509   BILITOTAL  1.0   --   0.9  0.6   < >  0.9  0.7    ALKPHOS  127   --   90  66   < >  90  109   ALT  19  <5*  14  25   < >  28  28   AST  17   --   12  15   < >  30  28   ALBUMIN  3.0*   --   3.4  3.1*   < >  4.1  3.8   LDH   --    --    --    --    --   453  434    < > = values in this interval not displayed.   Results for PHANI AVITIA (MRN [id number]) as of 9/27/2017 13:13   Ref. Range 5/23/2017 13:11 6/8/2017 12:06 9/19/2017 11:44   IGA Latest Ref Range: 70 - 380 mg/dL 328  367   IGG Latest Ref Range: 695 - 1620 mg/dL 1370  1390   IGM Latest Ref Range: 60 - 265 mg/dL 193  228   Immunofixation ELP Unknown (Note)...  (Note)   Kappa Free Lt Chain Latest Ref Range: 0.33 - 1.94 mg/dL 5.51 (H)  2.89 (H)   Kappa Lambda Ratio Latest Ref Range: 0.26 - 1.65  1.69 (H)  1.01   Lambda Free Lt Chain Latest Ref Range: 0.57 - 2.63 mg/dL 3.26 (H)  2.85 (H)   LEUKEMIA LYMPHOMA EVALUATION (FLOW CYTOMETRY) Unknown  Rpt    Monoclonal Peak Latest Ref Range: 0.0 g/dL 0.4 (H)  0.4 (H)     ASSESSMENT:   1- This is a 72-year-old gentleman with monoclonal gammopathy of unknown significance.    Bone marrow biopsy 6/8/17- normo cellular marrow with 0.3% monoclonal plasma cells. Normal flow. One (5%) of the 20 metaphase cells analyzed had a hyperdiploid karyotype   with gains of one extra copy each of chromosomes 5, 7, 9, 15, and 19, and loss of one copy each of chromosomes 13 and 22. 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   Labs were reviewed withteh patient- stable , no end organ damage.     PLAN:     1. Labs before next visit- CBC diff, CMP, IFXN, SPEP, light chain.  2.  RTC MD 6 months  3- Continue B12 2000 mcg once a week  CASSIDY MAYO MD    9/27/2017    CC: Danny Paige MD

## 2017-09-27 NOTE — PATIENT INSTRUCTIONS
1. Labs before next visit- CBC diff, CMP, IFXN, SPEP, light chain.- Scheduled by JOSELITO Storm  2.  RTC MD 6 months- scheduled by JOSELITO Storm  3- Continue B12 2000 mcg once a week      03/21/2018 1:00 pm Labs at Dr. Aldana's office     03/28/2018 1:00 pm Follow up Dr. Aldana

## 2017-09-27 NOTE — MR AVS SNAPSHOT
After Visit Summary   9/27/2017    Efrem Ramos    MRN: [id number]           Patient Information     Date Of Birth          1945        Visit Information        Provider Department      9/27/2017 1:00 PM Shae Aldana MD [facility]        Today's Diagnoses     MGUS (monoclonal gammopathy of unknown significance)    -  1      Care Instructions    1. Labs before next visit- CBC diff, CMP, IFXN, SPEP, light chain.- Scheduled by JOSELITO Storm  2.  RTC MD 6 months- scheduled by JOSELITO Storm  3- Continue B12 2000 mcg once a week      03/21/2018 1:00 pm Labs at Dr. Aldana's office     03/28/2018 1:00 pm Follow up Dr. Aldana           Follow-ups after your visit        Your next 10 appointments already scheduled     Mar 21, 2018  1:00 PM CDT   Return Visit with  Oncology Nurse   [facility] ([facility])    [facility]  [address]  [place] [address] [phone]            Mar 28, 2018  1:00 PM CDT   Return Visit with Shae Aldana MD   [facility] ([facility])    [facility] [place]  [address]  [place] [address] [phone]              Future tests that were ordered for you today     Open Future Orders        Priority Expected Expires Ordered    Protein electrophoresis Routine 3/27/2018 9/27/2018 9/27/2017    Kappa and lambda light chain Routine 3/27/2018 9/27/2018 9/27/2017    Comprehensive metabolic panel Routine 3/27/2018 9/27/2018 9/27/2017    CBC with platelets differential Routine 3/27/2018 9/27/2018 9/27/2017    Protein Immunofixation Serum Routine 3/27/2018 9/27/2018 9/27/2017            Who to contact     If you have questions or need follow up information about today's clinic visit or your schedule please contact [facility] directly at [phone].  Normal or non-critical lab and imaging results will be communicated to you by MyChart, letter or  phone within 4 business days after the clinic has received the results. If you do not hear from us within 7 days, please contact the clinic through [employer] or phone. If you have a critical or abnormal lab result, we will notify you by phone as soon as possible.  Submit refill requests through [employer] or call your pharmacy and they will forward the refill request to us. Please allow 3 business days for your refill to be completed.          Additional Information About Your Visit        [employer] Information     [employer] gives you secure access to your electronic health record. If you see a primary care provider, you can also send messages to your care team and make appointments. If you have questions, please call your primary care clinic.  If you do not have a primary care provider, please call [phone] and they will assist you.        Care EveryWhere ID     This is your Care EveryWhere ID. This could be used by other organizations to access your [place] medical records  [id number]        Your Vitals Were     Pulse Temperature Respirations Pulse Oximetry BMI (Body Mass Index)       77 98.1  F (36.7  C) (Oral) 16 100% 25.98 kg/m2        Blood Pressure from Last 3 Encounters:   09/27/17 104/66   09/19/17 108/70   09/05/17 91/52    Weight from Last 3 Encounters:   09/27/17 78.7 kg (173 lb 6.4 oz)   09/19/17 79.8 kg (175 lb 14.4 oz)   09/05/17 79.4 kg (175 lb)               Primary Care Provider Office Phone # Fax #    Danny Patric Paige [phone] [address] [facility] [address]  [facility] [address]        Equal Access to Services     [facility]: Hadii aad ku hadasho Soomaali, waaxda luqadaha, qaybta kaalmada angelique, alfonzo lynch. So [facility] [phone].    ATENCIÓN: Si habla español, tiene a palomo disposición servicios gratuitos de asistencia lingüística. Llame al [phone].    We comply with applicable federal civil rights laws and [place] laws. We do not  discriminate on the basis of race, color, national origin, age, disability sex, sexual orientation or gender identity.            Thank you!     Thank you for choosing [facility]  for your care. Our goal is always to provide you with excellent care. Hearing back from our patients is one way we can continue to improve our services. Please take a few minutes to complete the written survey that you may receive in the mail after your visit with us. Thank you!             Your Updated Medication List - Protect others around you: Learn how to safely use, store and throw away your medicines at www.disposemymeds.org.          This list is accurate as of: 9/27/17  1:26 PM.  Always use your most recent med list.                   Brand Name Dispense Instructions for use Diagnosis    acetaminophen 500 MG tablet    TYLENOL     Take 1,000 mg by mouth every 6 hours as needed for mild pain        apixaban ANTICOAGULANT 5 MG tablet    ELIQUIS    180 tablet    Take 1 tablet (5 mg) by mouth 2 times daily    Atrial fibrillation and flutter (H)       ASPIRIN PO      Take 81 mg by mouth every morning        atorvastatin 40 MG tablet    LIPITOR    30 tablet    Take 1 tablet (40 mg) by mouth daily    Coronary artery disease involving native coronary artery of native heart with angina pectoris (H)       Cyanocobalamin 2000 MCG Tabs      Take 2,000 mcg by mouth every 7 days        digoxin 125 MCG tablet    LANOXIN    30 tablet    Take 1 tablet (125 mcg) by mouth daily    Atrial fibrillation and flutter (H)       gabapentin 300 MG capsule    NEURONTIN    60 capsule    Take 2 capsules (600 mg) by mouth every evening    Restless legs syndrome (RLS)       metoprolol 25 MG 24 hr tablet    TOPROL-XL    30 tablet    Take 0.5 tablets (12.5 mg) by mouth daily    Nonrheumatic aortic valve stenosis       multivitamin Tabs tablet      Take 1 tablet by mouth 2 times daily

## 2017-10-09 ENCOUNTER — TRANSFERRED RECORDS (OUTPATIENT)
Dept: HEALTH INFORMATION MANAGEMENT | Facility: CLINIC | Age: 72
End: 2017-10-09

## 2017-10-31 ENCOUNTER — DOCUMENTATION ONLY (OUTPATIENT)
Dept: CARDIOLOGY | Facility: CLINIC | Age: 72
End: 2017-10-31

## 2017-10-31 DIAGNOSIS — I25.810 CORONARY ARTERY DISEASE INVOLVING CORONARY BYPASS GRAFT OF NATIVE HEART WITHOUT ANGINA PECTORIS: Primary | ICD-10-CM

## 2017-10-31 NOTE — PROGRESS NOTES
Called pt and left VM after reviewing Dr. Tineo's notes. No change to spirometry - CPAP use encouraged and nasal spray given (Dymista).     Encouraged to call Opal with update on breathing and if wants to try a longer trial of diuretic to see if breathing improves.  Previously tried Lasix x 3 days.

## 2017-11-01 DIAGNOSIS — I25.119 CORONARY ARTERY DISEASE INVOLVING NATIVE CORONARY ARTERY OF NATIVE HEART WITH ANGINA PECTORIS (H): ICD-10-CM

## 2017-11-01 RX ORDER — ATORVASTATIN CALCIUM 40 MG/1
40 TABLET, FILM COATED ORAL DAILY
Qty: 90 TABLET | Refills: 3 | Status: ON HOLD | OUTPATIENT
Start: 2017-11-01 | End: 2018-09-10

## 2018-01-22 DIAGNOSIS — I48.91 ATRIAL FIBRILLATION AND FLUTTER (H): ICD-10-CM

## 2018-01-22 DIAGNOSIS — I48.92 ATRIAL FIBRILLATION AND FLUTTER (H): ICD-10-CM

## 2018-01-24 DIAGNOSIS — I35.0 NONRHEUMATIC AORTIC VALVE STENOSIS: ICD-10-CM

## 2018-01-24 RX ORDER — METOPROLOL SUCCINATE 25 MG/1
12.5 TABLET, EXTENDED RELEASE ORAL DAILY
Qty: 45 TABLET | Refills: 2 | Status: SHIPPED | OUTPATIENT
Start: 2018-01-24 | End: 2018-10-08

## 2018-02-08 ENCOUNTER — TRANSFERRED RECORDS (OUTPATIENT)
Dept: HEALTH INFORMATION MANAGEMENT | Facility: CLINIC | Age: 73
End: 2018-02-08

## 2018-02-16 NOTE — PROGRESS NOTES
Received call back from Mariajose (Efrem was napping).  Let them know that we could try a diuretic to see if any retained fluid could be contributing to HIGH, but if he was happy with how things were, we certainly didn't need to try this.    Wife will speak with Efrem and call with update and decision.     Will see Dr. Chavis 6 months. If we end up starting diuretic, will need FU blood work and earlier FU.   Is This A New Presentation, Or A Follow-Up?: Skin Lesions

## 2018-03-09 DIAGNOSIS — G25.81 RESTLESS LEGS SYNDROME (RLS): ICD-10-CM

## 2018-03-09 RX ORDER — GABAPENTIN 300 MG/1
600 CAPSULE ORAL EVERY EVENING
Qty: 60 CAPSULE | Refills: 5 | OUTPATIENT
Start: 2018-03-09

## 2018-03-20 ENCOUNTER — OFFICE VISIT (OUTPATIENT)
Dept: SLEEP MEDICINE | Facility: CLINIC | Age: 73
End: 2018-03-20
Payer: MEDICARE

## 2018-03-20 VITALS
OXYGEN SATURATION: 91 % | SYSTOLIC BLOOD PRESSURE: 131 MMHG | HEART RATE: 68 BPM | BODY MASS INDEX: 27.4 KG/M2 | DIASTOLIC BLOOD PRESSURE: 84 MMHG | WEIGHT: 185 LBS | RESPIRATION RATE: 16 BRPM | HEIGHT: 69 IN

## 2018-03-20 DIAGNOSIS — G25.81 RESTLESS LEGS SYNDROME (RLS): ICD-10-CM

## 2018-03-20 DIAGNOSIS — G47.33 OSA (OBSTRUCTIVE SLEEP APNEA): Primary | ICD-10-CM

## 2018-03-20 DIAGNOSIS — G47.61 PLMD (PERIODIC LIMB MOVEMENT DISORDER): ICD-10-CM

## 2018-03-20 PROCEDURE — 99213 OFFICE O/P EST LOW 20 MIN: CPT | Performed by: INTERNAL MEDICINE

## 2018-03-20 RX ORDER — GABAPENTIN 300 MG/1
600 CAPSULE ORAL EVERY EVENING
Qty: 60 CAPSULE | Refills: 5 | Status: SHIPPED | OUTPATIENT
Start: 2018-03-20 | End: 2018-09-04

## 2018-03-20 NOTE — PATIENT INSTRUCTIONS

## 2018-03-20 NOTE — NURSING NOTE
"Chief Complaint   Patient presents with     Sleep Problem     Medication follow up       Initial /85  Pulse 68  Resp 16  Ht 1.74 m (5' 8.5\")  Wt 83.9 kg (185 lb)  SpO2 91%  BMI 27.72 kg/m2 Estimated body mass index is 27.72 kg/(m^2) as calculated from the following:    Height as of this encounter: 1.74 m (5' 8.5\").    Weight as of this encounter: 83.9 kg (185 lb).  Medication Reconciliation: complete   ESS 6  Yudy Felix MA      "

## 2018-03-20 NOTE — PROGRESS NOTES
Visit Date:   03/20/2018      SLEEP CLINIC PROGRESS NOTE      CHIEF COMPLAINT:  Follow up for obstructive sleep apnea and restless leg syndrome.      HISTORY OF PRESENT ILLNESS:  Mr. Ramos was accompanied by his wife.  He was diagnosed with severe obstructive sleep apnea in 2015.  A PSG from 07/20/2015 showed an apnea-hypopnea index of 43.4 per hour and RDI of 58.8 per hour.  PLM index was 40 per hour and PLM arousal index was 8 per hour.      The patient was prescribed CPAP therapy with a good response initially.  However, he discontinued treatment after he had amiodarone toxicity and pneumonia and a subsequent shortness of breath.  He has not resumed CPAP therapy.      He has been prescribed Gabapentin 600 mg for management of restless legs and periodic limb movements of sleep.  This has worked reasonably well for him.      The patient has a broken sleep pattern.  He goes to bed at midnight and falls asleep within 20 minutes.  He, however, wakes up every 2 hours for uncertain reasons.  In the morning he wakes up by 8:00 a.m.  He takes short periods of naps throughout the day.      The patient reports that he has been uncomfortable with a full face mask and has found it difficult to tolerate CPAP after his pneumonia.      I counseled him regarding the severity of his sleep apnea and importance of treatment.  Recommended a titration polysomnogram for an optimal titration of CPAP for him.  The patient was in agreement.      He wants to continue with gabapentin.      ASSESSMENT:   1.  Severe obstructive sleep apnea.   2.  Restless legs syndrome.      TREATMENT PLAN:   1.  A titration polysomnogram for titration of CPAP.   2.  Continue gabapentin at a dose of 600 mg at night.   3.  Follow up after sleep study.      I spent a total of 15 minutes with this patient, with more than 50% spent in counseling.         MEGAN HERNANDEZ MD             D: 03/20/2018   T: 03/20/2018   MT: AMBER      Name:     PHANI RAMOS   MRN:       [id number]        Account:      [id number]   :      1945           Visit Date:   2018      Document: [id number]       cc: Danny Paige MD

## 2018-03-20 NOTE — MR AVS SNAPSHOT
After Visit Summary   3/20/2018    Efrem Ramos    MRN: [id number]           Patient Information     Date Of Birth          1945        Visit Information        Provider Department      3/20/2018 1:30 PM Ron Pacheco MD [facility] [place]        Today's Diagnoses     MEL (obstructive sleep apnea)    -  1    Restless legs syndrome (RLS)        PLMD (periodic limb movement disorder)          Care Instructions      Your BMI is Body mass index is 27.72 kg/(m^2).  Weight management is a personal decision.  If you are interested in exploring weight loss strategies, the following discussion covers the approaches that may be successful. Body mass index (BMI) is one way to tell whether you are at a healthy weight, overweight, or obese. It measures your weight in relation to your height.  A BMI of 18.5 to 24.9 is in the healthy range. A person with a BMI of 25 to 29.9 is considered overweight, and someone with a BMI of 30 or greater is considered obese. More than two-thirds of American adults are considered overweight or obese.  Being overweight or obese increases the risk for further weight gain. Excess weight may lead to heart disease and diabetes.  Creating and following plans for healthy eating and physical activity may help you improve your health.  Weight control is part of healthy lifestyle and includes exercise, emotional health, and healthy eating habits. Careful eating habits lifelong are the mainstay of weight control. Though there are significant health benefits from weight loss, long-term weight loss with diet alone may be very difficult to achieve- studies show long-term success with dietary management in less than 10% of people. Attaining a healthy weight may be especially difficult to achieve in those with severe obesity. In some cases, medications, devices and surgical management might be considered.  What can you do?  If you are overweight or obese and are interested in  methods for weight loss, you should discuss this with your provider.     Consider reducing daily calorie intake by 500 calories.     Keep a food journal.     Avoiding skipping meals, consider cutting portions instead.    Diet combined with exercise helps maintain muscle while optimizing fat loss. Strength training is particularly important for building and maintaining muscle mass. Exercise helps reduce stress, increase energy, and improves fitness. Increasing exercise without diet control, however, may not burn enough calories to loose weight.       Start walking three days a week 10-20 minutes at a time    Work towards walking thirty minutes five days a week     Eventually, increase the speed of your walking for 1-2 minutes at time    In addition, we recommend that you review healthy lifestyles and methods for weight loss available through the National Institutes of Health patient information sites:  http://win.niddk.nih.gov/publications/index.htm    And look into health and wellness programs that may be available through your health insurance provider, employer, local community center, or anna club.    Weight management plan: Patient was referred to their PCP to discuss a diet and exercise plan.              Follow-ups after your visit        Your next 10 appointments already scheduled     Mar 21, 2018  1:00 PM CDT   Return Visit with  Oncology Nurse   [facility] ([facility])    [facility]  [address]  [facility] [address]   [phone]            Mar 28, 2018  1:00 PM CDT   Return Visit with Shae Aldana MD   [facility] ([facility])    [facility]  [address]  [facility] [address]   [phone]            May 03, 2018  8:30 PM CDT   PSG Titration with BED 6  SLEEP   [facility] ([facility])    [address]  [facility] [address]  [place]  [address] [phone]            May 11, 2018  1:45 PM CDT   Return Visit with Bella Chavis MD   [facility] ([facility])    [address]  [place] [address] [phone]            May 16, 2018  1:30 PM CDT   Return Sleep Patient with Ron Pacheco MD   [facility] ([facility])    [address]  [address]  [place] [address] [phone]              Future tests that were ordered for you today     Open Future Orders        Priority Expected Expires Ordered    Comprehensive Sleep Study Routine  9/16/2018 3/20/2018            Who to contact     If you have questions or need follow up information about today's clinic visit or your schedule please contact [facility] directly at [phone].  Normal or non-critical lab and imaging results will be communicated to you by MyChart, letter or phone within 4 business days after the clinic has received the results. If you do not hear from us within 7 days, please contact the clinic through Syllabustert or phone. If you have a critical or abnormal lab result, we will notify you by phone as soon as possible.  Submit refill requests through [employer] or call your pharmacy and they will forward the refill request to us. Please allow 3 business days for your refill to be completed.          Additional Information About Your Visit        MyChart Information     [employer] gives you secure access to your electronic health record. If you see a primary care provider, you can also send messages to your care team and make appointments. If you have questions, please call your primary care clinic.  If you do not have a primary care provider, please call [phone] and they will assist you.        Care EveryWhere ID     This is your Care EveryWhere ID. This could be used by other organizations to access your [facility]  "records  [id number]        Your Vitals Were     Pulse Respirations Height Pulse Oximetry BMI (Body Mass Index)       68 16 1.74 m (5' 8.5\") 91% 27.72 kg/m2        Blood Pressure from Last 3 Encounters:   03/20/18 131/84   09/27/17 104/66   09/19/17 108/70    Weight from Last 3 Encounters:   03/20/18 83.9 kg (185 lb)   09/27/17 78.7 kg (173 lb 6.4 oz)   09/19/17 79.8 kg (175 lb 14.4 oz)                 Where to get your medicines      These medications were sent to [id number] [place] [id number] [facility] [address] [facility] [address]     Phone:  [phone]     gabapentin 300 MG capsule          Primary Care Provider Office Phone # Fax #    Danny Paige [phone] [address] [facility] [address]  [facility] [address]        Equal Access to Services     [facility] : Hadii aad ku hadasho Soomaali, waaxda luqadaha, qaybta kaalmada angelique, alfonzo lynch. So [facility] [phone].    ATENCIÓN: Si habla español, tiene a palomo disposición servicios gratuitos de asistencia lingüística. Shilo al [phone].    We comply with applicable federal civil rights laws and [place] laws. We do not discriminate on the basis of race, color, national origin, age, disability, sex, sexual orientation, or gender identity.            Thank you!     Thank you for choosing [facility]  for your care. Our goal is always to provide you with excellent care. Hearing back from our patients is one way we can continue to improve our services. Please take a few minutes to complete the written survey that you may receive in the mail after your visit with us. Thank you!             Your Updated Medication List - Protect others around you: Learn how to safely use, store and throw away your medicines at www.disposemymeds.org.          This list is accurate as of 3/20/18  2:02 PM.  Always use your most recent med " list.                   Brand Name Dispense Instructions for use Diagnosis    acetaminophen 500 MG tablet    TYLENOL     Take 1,000 mg by mouth every 6 hours as needed for mild pain        apixaban ANTICOAGULANT 5 MG tablet    ELIQUIS    180 tablet    Take 1 tablet (5 mg) by mouth 2 times daily    Atrial fibrillation and flutter (H)       ASPIRIN PO      Take 81 mg by mouth every morning        atorvastatin 40 MG tablet    LIPITOR    90 tablet    Take 1 tablet (40 mg) by mouth daily    Coronary artery disease involving native coronary artery of native heart with angina pectoris (H)       Cyanocobalamin 2000 MCG Tabs      Take 2,000 mcg by mouth every 7 days        digoxin 125 MCG tablet    LANOXIN    30 tablet    Take 1 tablet (125 mcg) by mouth daily    Atrial fibrillation and flutter (H)       gabapentin 300 MG capsule    NEURONTIN    60 capsule    Take 2 capsules (600 mg) by mouth every evening    Restless legs syndrome (RLS)       metoprolol succinate 25 MG 24 hr tablet    TOPROL-XL    45 tablet    Take 0.5 tablets (12.5 mg) by mouth daily    Nonrheumatic aortic valve stenosis       multivitamin Tabs tablet      Take 1 tablet by mouth 2 times daily

## 2018-03-21 ENCOUNTER — HOSPITAL ENCOUNTER (OUTPATIENT)
Facility: CLINIC | Age: 73
Setting detail: SPECIMEN
Discharge: HOME | End: 2018-03-21
Attending: INTERNAL MEDICINE | Admitting: INTERNAL MEDICINE
Payer: MEDICARE

## 2018-03-21 ENCOUNTER — ONCOLOGY VISIT (OUTPATIENT)
Dept: ONCOLOGY | Facility: CLINIC | Age: 73
End: 2018-03-21
Attending: INTERNAL MEDICINE
Payer: MEDICARE

## 2018-03-21 DIAGNOSIS — D47.2 MGUS (MONOCLONAL GAMMOPATHY OF UNKNOWN SIGNIFICANCE): ICD-10-CM

## 2018-03-21 LAB
ALBUMIN SERPL-MCNC: 3 G/DL (ref 3.4–5)
ALP SERPL-CCNC: 125 U/L (ref 40–150)
ALT SERPL W P-5'-P-CCNC: 14 U/L (ref 0–70)
ANION GAP SERPL CALCULATED.3IONS-SCNC: 5 MMOL/L (ref 3–14)
AST SERPL W P-5'-P-CCNC: 19 U/L (ref 0–45)
BASOPHILS # BLD AUTO: 0.1 10E9/L (ref 0–0.2)
BASOPHILS NFR BLD AUTO: 0.5 %
BILIRUB SERPL-MCNC: 0.9 MG/DL (ref 0.2–1.3)
BUN SERPL-MCNC: 8 MG/DL (ref 7–30)
CALCIUM SERPL-MCNC: 8.3 MG/DL (ref 8.5–10.1)
CHLORIDE SERPL-SCNC: 108 MMOL/L (ref 94–109)
CO2 SERPL-SCNC: 28 MMOL/L (ref 20–32)
CREAT SERPL-MCNC: 0.82 MG/DL (ref 0.66–1.25)
DIFFERENTIAL METHOD BLD: ABNORMAL
EOSINOPHIL # BLD AUTO: 0.4 10E9/L (ref 0–0.7)
EOSINOPHIL NFR BLD AUTO: 3.8 %
ERYTHROCYTE [DISTWIDTH] IN BLOOD BY AUTOMATED COUNT: 14.2 % (ref 10–15)
GFR SERPL CREATININE-BSD FRML MDRD: >90 ML/MIN/1.7M2
GLUCOSE SERPL-MCNC: 84 MG/DL (ref 70–99)
HCT VFR BLD AUTO: 39.6 % (ref 40–53)
HGB BLD-MCNC: 13 G/DL (ref 13.3–17.7)
IMM GRANULOCYTES # BLD: 0.1 10E9/L (ref 0–0.4)
IMM GRANULOCYTES NFR BLD: 0.6 %
LYMPHOCYTES # BLD AUTO: 2.6 10E9/L (ref 0.8–5.3)
LYMPHOCYTES NFR BLD AUTO: 22.3 %
MCH RBC QN AUTO: 31.9 PG (ref 26.5–33)
MCHC RBC AUTO-ENTMCNC: 32.8 G/DL (ref 31.5–36.5)
MCV RBC AUTO: 97 FL (ref 78–100)
MONOCYTES # BLD AUTO: 1 10E9/L (ref 0–1.3)
MONOCYTES NFR BLD AUTO: 8.3 %
NEUTROPHILS # BLD AUTO: 7.5 10E9/L (ref 1.6–8.3)
NEUTROPHILS NFR BLD AUTO: 64.5 %
NRBC # BLD AUTO: 0 10*3/UL
NRBC BLD AUTO-RTO: 0 /100
PLATELET # BLD AUTO: 293 10E9/L (ref 150–450)
POTASSIUM SERPL-SCNC: 3.9 MMOL/L (ref 3.4–5.3)
PROT SERPL-MCNC: 7.2 G/DL (ref 6.8–8.8)
RBC # BLD AUTO: 4.07 10E12/L (ref 4.4–5.9)
SODIUM SERPL-SCNC: 141 MMOL/L (ref 133–144)
WBC # BLD AUTO: 11.6 10E9/L (ref 4–11)

## 2018-03-21 PROCEDURE — 84165 PROTEIN E-PHORESIS SERUM: CPT | Performed by: INTERNAL MEDICINE

## 2018-03-21 PROCEDURE — 82784 ASSAY IGA/IGD/IGG/IGM EACH: CPT | Performed by: INTERNAL MEDICINE

## 2018-03-21 PROCEDURE — 83883 ASSAY NEPHELOMETRY NOT SPEC: CPT | Performed by: INTERNAL MEDICINE

## 2018-03-21 PROCEDURE — 85025 COMPLETE CBC W/AUTO DIFF WBC: CPT | Performed by: INTERNAL MEDICINE

## 2018-03-21 PROCEDURE — 86334 IMMUNOFIX E-PHORESIS SERUM: CPT | Performed by: INTERNAL MEDICINE

## 2018-03-21 PROCEDURE — 00000402 ZZHCL STATISTIC TOTAL PROTEIN: Performed by: INTERNAL MEDICINE

## 2018-03-21 PROCEDURE — 80053 COMPREHEN METABOLIC PANEL: CPT | Performed by: INTERNAL MEDICINE

## 2018-03-21 PROCEDURE — 36415 COLL VENOUS BLD VENIPUNCTURE: CPT

## 2018-03-21 NOTE — LETTER
3/21/2018         RE: Efrem Ramos  [address]  [facility] [address]        Dear Colleague,    Thank you for referring your patient, Efrem Ramos, to the [facility]. Please see a copy of my visit note below.    Medical Assistant Note:  Efrem Ramos presents today for lab visit.    Patient seen by provider today: No.   present during visit today: Not Applicable.    Concerns: No Concerns.    Procedure:  Lab draw site: LAC, Needle type: BF, Gauge: 21 g gauze and coban applied.    Post Assessment:  Labs drawn without difficulty: Yes.    Discharge Plan:  Departure Mode: Ambulatory.    Face to Face Time: 4.    Dotty Batista MA              Again, thank you for allowing me to participate in the care of your patient.        Sincerely,        Oncology Nurse

## 2018-03-21 NOTE — PROGRESS NOTES
Medical Assistant Note:  Efrem Ramos presents today for lab visit.    Patient seen by provider today: No.   present during visit today: Not Applicable.    Concerns: No Concerns.    Procedure:  Lab draw site: LAC, Needle type: BF, Gauge: 21 g gauze and coban applied.    Post Assessment:  Labs drawn without difficulty: Yes.    Discharge Plan:  Departure Mode: Ambulatory.    Face to Face Time: 4.    Dotty Batista MA

## 2018-03-21 NOTE — MR AVS SNAPSHOT
After Visit Summary   3/21/2018    Efrem Ramos    MRN: [id number]           Patient Information     Date Of Birth          1945        Visit Information        Provider Department      3/21/2018 1:00 PM Nurse,  Oncology [facility]        Today's Diagnoses     MGUS (monoclonal gammopathy of unknown significance)           Follow-ups after your visit        Your next 10 appointments already scheduled     Mar 28, 2018  1:00 PM CDT   Return Visit with Shae Aldana MD   [facility] ([facility])    [facility]  [address]  [facility] [address]   [phone]            May 03, 2018  8:30 PM CDT   PSG Titration with BED 6  SLEEP   [facility] ([facility])    [address]  [place] [address]   [phone]            May 11, 2018  1:45 PM CDT   Return Visit with Bella Chavis MD   [facility] ([facility])    [address]  [facility] [address] [phone] [address]            May 16, 2018  1:30 PM CDT   Return Sleep Patient with Ron Pacheco MD   [facility] ([facility])    [address]  [address]  [facility] [address] [phone]              Future tests that were ordered for you today     Open Future Orders        Priority Expected Expires Ordered    Comprehensive Sleep Study Routine  9/16/2018 3/20/2018            Who to contact     If you have questions or need follow up information about today's clinic visit or your schedule please contact [facility] directly at [phone].  Normal or non-critical lab and imaging results will be communicated to you by MyChart, letter or phone within 4 business days after the clinic has received the results. If you do not hear from us within 7 days, please contact the clinic through Redkneet  or phone. If you have a critical or abnormal lab result, we will notify you by phone as soon as possible.  Submit refill requests through [employer] or call your pharmacy and they will forward the refill request to us. Please allow 3 business days for your refill to be completed.          Additional Information About Your Visit        Conformiqhart Information     [employer] gives you secure access to your electronic health record. If you see a primary care provider, you can also send messages to your care team and make appointments. If you have questions, please call your primary care clinic.  If you do not have a primary care provider, please call [phone] and they will assist you.        Care EveryWhere ID     This is your Care EveryWhere ID. This could be used by other organizations to access your [facility] medical records  [id number]         Blood Pressure from Last 3 Encounters:   03/20/18 131/84   09/27/17 104/66   09/19/17 108/70    Weight from Last 3 Encounters:   03/20/18 83.9 kg (185 lb)   09/27/17 78.7 kg (173 lb 6.4 oz)   09/19/17 79.8 kg (175 lb 14.4 oz)              We Performed the Following     CBC with platelets differential     Comprehensive metabolic panel     Kappa and lambda light chain     Protein electrophoresis     Protein Immunofixation Serum        Primary Care Provider Office Phone # Fax #    Danny Paige [phone] [address] [facility] [address]  [place] [address]        Equal Access to Services     MONA HARRIS : Hadii aad ku hadasho Soomaali, waaxda luqadaha, qaybta kaalmada adeegyada, alfonzo garcia haymiryamn yris olvera . So [facility] [phone].    ATENCIÓN: Si habla español, tiene a palomo disposición servicios gratuitos de asistencia lingüística. Llame al [phone].    We comply with applicable federal civil rights laws and [place] laws. We do not discriminate on the basis of race, color, national origin, age, disability, sex, sexual orientation, or gender  identity.            Thank you!     Thank you for choosing [facility]  for your care. Our goal is always to provide you with excellent care. Hearing back from our patients is one way we can continue to improve our services. Please take a few minutes to complete the written survey that you may receive in the mail after your visit with us. Thank you!             Your Updated Medication List - Protect others around you: Learn how to safely use, store and throw away your medicines at www.disposemymeds.org.          This list is accurate as of 3/21/18  1:07 PM.  Always use your most recent med list.                   Brand Name Dispense Instructions for use Diagnosis    acetaminophen 500 MG tablet    TYLENOL     Take 1,000 mg by mouth every 6 hours as needed for mild pain        apixaban ANTICOAGULANT 5 MG tablet    ELIQUIS    180 tablet    Take 1 tablet (5 mg) by mouth 2 times daily    Atrial fibrillation and flutter (H)       ASPIRIN PO      Take 81 mg by mouth every morning        atorvastatin 40 MG tablet    LIPITOR    90 tablet    Take 1 tablet (40 mg) by mouth daily    Coronary artery disease involving native coronary artery of native heart with angina pectoris (H)       Cyanocobalamin 2000 MCG Tabs      Take 2,000 mcg by mouth every 7 days        digoxin 125 MCG tablet    LANOXIN    30 tablet    Take 1 tablet (125 mcg) by mouth daily    Atrial fibrillation and flutter (H)       gabapentin 300 MG capsule    NEURONTIN    60 capsule    Take 2 capsules (600 mg) by mouth every evening    Restless legs syndrome (RLS)       metoprolol succinate 25 MG 24 hr tablet    TOPROL-XL    45 tablet    Take 0.5 tablets (12.5 mg) by mouth daily    Nonrheumatic aortic valve stenosis       multivitamin Tabs tablet      Take 1 tablet by mouth 2 times daily

## 2018-03-22 LAB
ALBUMIN SERPL ELPH-MCNC: 3.3 G/DL (ref 3.7–5.1)
ALPHA1 GLOB SERPL ELPH-MCNC: 0.3 G/DL (ref 0.2–0.4)
ALPHA2 GLOB SERPL ELPH-MCNC: 0.8 G/DL (ref 0.5–0.9)
B-GLOBULIN SERPL ELPH-MCNC: 0.8 G/DL (ref 0.6–1)
GAMMA GLOB SERPL ELPH-MCNC: 1.8 G/DL (ref 0.7–1.6)
IGA SERPL-MCNC: 419 MG/DL (ref 70–380)
IGG SERPL-MCNC: 1660 MG/DL (ref 695–1620)
IGM SERPL-MCNC: 234 MG/DL (ref 60–265)
KAPPA LC UR-MCNC: 3.51 MG/DL (ref 0.33–1.94)
KAPPA LC/LAMBDA SER: 1.42 {RATIO} (ref 0.26–1.65)
LAMBDA LC SERPL-MCNC: 2.48 MG/DL (ref 0.57–2.63)
M PROTEIN SERPL ELPH-MCNC: 0.4 G/DL
PROT PATTERN SERPL ELPH-IMP: ABNORMAL
PROT PATTERN SERPL IFE-IMP: ABNORMAL

## 2018-03-25 ENCOUNTER — TELEPHONE (OUTPATIENT)
Dept: FAMILY MEDICINE | Facility: CLINIC | Age: 73
End: 2018-03-25

## 2018-03-25 NOTE — TELEPHONE ENCOUNTER
Can we call Efrem Ramos and let him know that     He was recommended to follow-up in the clinic for elevated blood pressure

## 2018-03-26 NOTE — TELEPHONE ENCOUNTER
Left a message for pt return our call. Patient needs to schedule a follow up appointment with .    Beth Staples CMA

## 2018-03-26 NOTE — TELEPHONE ENCOUNTER
Pt's wife called back stating he does not need to come in and get it checked because his BP was fine at last two appointment with sleep clinic.    Pt has appointment in May with cardiologist.

## 2018-03-28 ENCOUNTER — HOSPITAL ENCOUNTER (OUTPATIENT)
Facility: CLINIC | Age: 73
Setting detail: SPECIMEN
End: 2018-03-28
Attending: INTERNAL MEDICINE
Payer: MEDICARE

## 2018-03-28 ENCOUNTER — ONCOLOGY VISIT (OUTPATIENT)
Dept: ONCOLOGY | Facility: CLINIC | Age: 73
End: 2018-03-28
Attending: INTERNAL MEDICINE
Payer: MEDICARE

## 2018-03-28 VITALS
OXYGEN SATURATION: 93 % | WEIGHT: 182.6 LBS | TEMPERATURE: 98.2 F | DIASTOLIC BLOOD PRESSURE: 75 MMHG | HEART RATE: 69 BPM | SYSTOLIC BLOOD PRESSURE: 118 MMHG | RESPIRATION RATE: 16 BRPM | BODY MASS INDEX: 27.36 KG/M2

## 2018-03-28 DIAGNOSIS — D47.2 MGUS (MONOCLONAL GAMMOPATHY OF UNKNOWN SIGNIFICANCE): Primary | ICD-10-CM

## 2018-03-28 PROCEDURE — G0463 HOSPITAL OUTPT CLINIC VISIT: HCPCS

## 2018-03-28 PROCEDURE — 99213 OFFICE O/P EST LOW 20 MIN: CPT | Performed by: INTERNAL MEDICINE

## 2018-03-28 ASSESSMENT — PAIN SCALES - GENERAL: PAINLEVEL: NO PAIN (0)

## 2018-03-28 NOTE — PROGRESS NOTES
"Oncology Rooming Note    March 28, 2018 1:06 PM   Efrem Ramos is a 72 year old male who presents for:    Chief Complaint   Patient presents with     Oncology Clinic Visit     CAD (coronary artery disease)-moderate nonobstructive 2 vessel     Initial Vitals: /75 (BP Location: Left arm, Patient Position: Sitting, Cuff Size: Adult Regular)  Pulse 69  Temp 98.2  F (36.8  C) (Oral)  Resp 16  Wt 82.8 kg (182 lb 9.6 oz)  SpO2 93%  BMI 27.36 kg/m2 Estimated body mass index is 27.36 kg/(m^2) as calculated from the following:    Height as of 3/20/18: 1.74 m (5' 8.5\").    Weight as of this encounter: 82.8 kg (182 lb 9.6 oz). Body surface area is 2 meters squared.  No Pain (0) Comment: Data Unavailable   No LMP for male patient.  Allergies reviewed: Yes  Medications reviewed: Yes    Medications: Medication refills not needed today.  Pharmacy name entered into FreshOffice:    [id number] - [place], [place] - [address] AT [address] [facility] [place]  [place] - [address]  [id number]  [place], [place] - [address] AT [address] [place] [address]    Clinical concerns: no    5 minutes for nursing intake (face to face time)          Lala Sanchez MA            "

## 2018-03-28 NOTE — PROGRESS NOTES
[facility]  HEMATOLOGY ONCOLOGY    HEMATOLOGY FOLLOWUP NOTE      DIAGNOSES:   1.  Monoclonal gammopathy of unknown significance.   2.  Anemia.   3.  History of Vocal cord cancer.      TREATMENT:     1. Vitamin B12 2000 mcg p.o. daily.      SUBJECTIVE:  Patient comes in for followup today. He has been at his baseline. No new complaints.      REVIEW OF SYSTEMS:  A complete review of systems was performed and found to be negative other than pertinent positives mentioned in History of Present Illness.     Past medical, social histories reviewed.    Meds- Reviewed.     PHYSICAL EXAMINATION:   VITAL SIGNS:  /75 (BP Location: Left arm, Patient Position: Sitting, Cuff Size: Adult Regular)  Pulse 69  Temp 98.2  F (36.8  C) (Oral)  Resp 16  Wt 82.8 kg (182 lb 9.6 oz)  SpO2 93%  BMI 27.36 kg/m2  CONSTITUTIONAL: Sitting comfortably.   HEENT: Pupils are equal. Oropharynx is clear.   NECK: No cervical or supraclavicular lymphadenopathy.   RESPIRATORY: Clear bilaterally.   CARD/VASC: S1, S2, regular.   GI: Soft, nontender, nondistended, no hepatosplenomegaly.   MUSKULOSKELETAL: Warm, well perfused.   NEUROLOGIC: Alert, awake.   INTEGUMENT: No rash.   LYMPHATICS: No edema.   PSYCH: Mood and affect was normal.     LABORATORY DATA AND IMAGING REVIEWED DURING THIS VISIT:  Recent Labs   Lab Test  03/21/18   1303  09/19/17   1144   NA  141  142   POTASSIUM  3.9  3.6   CHLORIDE  108  108   CO2  28  26   ANIONGAP  5  8   BUN  8  7   CR  0.82  0.81   GLC  84  82   ULISSES  8.3*  8.7     Recent Labs   Lab Test  03/21/18   1303  09/19/17   1144  09/05/17   0950  06/08/17   1120   WBC  11.6*  11.7*  10.9  11.5*   HGB  13.0*  12.8*  13.0*  12.5*   PLT  293  315  302  338   MCV  97  94  93  96   NEUTROPHIL  64.5  71.7   --   69.7     Recent Labs   Lab Test  03/21/18   1303  09/19/17   1144  08/24/17   1206  05/23/17   1311   09/16/13   1350  08/20/13   1509   BILITOTAL  0.9  1.0   --   0.9   < >  0.9  0.7    ALKPHOS  125  127   --   90   < >  90  109   ALT  14  19  <5*  14   < >  28  28   AST  19  17   --   12   < >  30  28   ALBUMIN  3.0*  3.0*   --   3.4   < >  4.1  3.8   LDH   --    --    --    --    --   453  434    < > = values in this interval not displayed.   Results for PHANI AVITIA (MRN [id number]) as of 3/28/2018 13:06   Ref. Range 9/19/2017 11:44 3/21/2018 13:03   Gamma Fraction Latest Ref Range: 0.7 - 1.6 g/dL 1.6 1.8 (H)   IGA Latest Ref Range: 70 - 380 mg/dL 367 419 (H)   IGG Latest Ref Range: 695 - 1620 mg/dL 1390 1660 (H)   IGM Latest Ref Range: 60 - 265 mg/dL 228 234   Immunofixation ELP Unknown (Note) (Note)   Kappa Free Lt Chain Latest Ref Range: 0.33 - 1.94 mg/dL 2.89 (H) 3.51 (H)   Kappa Lambda Ratio Latest Ref Range: 0.26 - 1.65  1.01 1.42   Lambda Free Lt Chain Latest Ref Range: 0.57 - 2.63 mg/dL 2.85 (H) 2.48   Monoclonal Peak Latest Ref Range: 0.0 g/dL 0.4 (H) 0.4 (H)     ECOG PS: 1    ASSESSMENT:   1- This is a 72-year-old gentleman with monoclonal gammopathy of unknown significance.    Bone marrow biopsy 6/8/17- normo cellular marrow with 0.3% monoclonal plasma cells. Normal flow. One (5%) of the 20 metaphase cells analyzed had a hyperdiploid karyotype   with gains of one extra copy each of chromosomes 5, 7, 9, 15, and 19, and loss of one copy each of chromosomes 13 and 22. 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   [facility]- M spike of 0.4 grams , no end organ damage.     PLAN:     1. Labs before next visit- CBC diff, CMP, IFXN, SPEP, light chain, B12.   2.  RTC MD 6 months  3- Continue B12 2000 mcg once a week  CASSIDY MAYO MD    3/28/2018    CC: Danny Paige MD

## 2018-03-28 NOTE — MR AVS SNAPSHOT
After Visit Summary   3/28/2018    Efrem Ramos    MRN: [id number]           Patient Information     Date Of Birth          1945        Visit Information        Provider Department      3/28/2018 1:00 PM Shae Aldana MD [facility]        Today's Diagnoses     MGUS (monoclonal gammopathy of unknown significance)    -  1      Care Instructions    1. Labs before next visit- CBC diff, CMP, IFXN, SPEP, light chain, B12.   2.  RTC MD 6 months  3- Continue B12 2000 mcg once a week          Follow-ups after your visit        Your next 10 appointments already scheduled     May 03, 2018  8:30 PM CDT   PSG Titration with BED 6  SLEEP   [facility] ([facility])    [address]  [place] [address]   [phone]            May 11, 2018  1:45 PM CDT   Return Visit with Bella Chavis MD   [facility] ([facility])    [address]  [place] [address] [phone] OPT 2            May 16, 2018  1:30 PM CDT   Return Sleep Patient with Ron Pacheco MD   [facility] ([facility])    [address]  [address]  [place] [address]   [phone]            Sep 19, 2018  1:15 PM CDT   Return Visit with  Oncology Nurse   [facility] ([facility])    [facility] [place]  [address]  [place] [address] [phone]            Sep 26, 2018  1:00 PM CDT   Return Visit with Shae Aldana MD   [facility] ([facility])    [facility] [place]  [address]  [place] [address]   [phone]              Future tests that were ordered for you today     Open Future Orders        Priority Expected Expires Ordered    Vitamin B12 Routine 9/28/2018 3/28/2019 3/28/2018    Protein electrophoresis Routine 9/28/2018 3/28/2019  3/28/2018    Kappa and lambda light chain Routine 9/28/2018 3/28/2019 3/28/2018    Comprehensive metabolic panel Routine 9/28/2018 3/28/2019 3/28/2018    CBC with platelets differential Routine 9/28/2018 3/28/2019 3/28/2018    Protein Immunofixation Serum Routine 9/28/2018 3/28/2019 3/28/2018            Who to contact     If you have questions or need follow up information about today's clinic visit or your schedule please contact [facility] directly at [phone].  Normal or non-critical lab and imaging results will be communicated to you by CICCWORLDhart, letter or phone within 4 business days after the clinic has received the results. If you do not hear from us within 7 days, please contact the clinic through Chief Trunk or phone. If you have a critical or abnormal lab result, we will notify you by phone as soon as possible.  Submit refill requests through Chief Trunk or call your pharmacy and they will forward the refill request to us. Please allow 3 business days for your refill to be completed.          Additional Information About Your Visit        MyChart Information     Chief Trunk gives you secure access to your electronic health record. If you see a primary care provider, you can also send messages to your care team and make appointments. If you have questions, please call your primary care clinic.  If you do not have a primary care provider, please call [phone] and they will assist you.        Care EveryWhere ID     This is your Care EveryWhere ID. This could be used by other organizations to access your [place] medical records  [id number]        Your Vitals Were     Pulse Temperature Respirations Pulse Oximetry BMI (Body Mass Index)       69 98.2  F (36.8  C) (Oral) 16 93% 27.36 kg/m2        Blood Pressure from Last 3 Encounters:   03/28/18 118/75   03/20/18 131/84   09/27/17 104/66    Weight from Last 3 Encounters:   03/28/18 82.8 kg (182 lb 9.6 oz)   03/20/18 83.9 kg (185 lb)   09/27/17 78.7 kg  (173 lb 6.4 oz)               Primary Care Provider Office Phone # Fax #    Danny Paige [phone] [address] [address]        Equal Access to Services     JAMEEL HARRIS : Hadii aad ku hadmeraryo Soomaali, waaxda luqadaha, qaybta kaalmada adeegyada, alfonzo garcia haymiryamn adedarleen blackman laabbyyair . So [facility] [phone].    ATENCIÓN: Si habla español, tiene a palomo disposición servicios gratuitos de asistencia lingüística. Llame al [phone].    We comply with applicable federal civil rights laws and [place] laws. We do not discriminate on the basis of race, color, national origin, age, disability, sex, sexual orientation, or gender identity.            Thank you!     Thank you for choosing [facility]  for your care. Our goal is always to provide you with excellent care. Hearing back from our patients is one way we can continue to improve our services. Please take a few minutes to complete the written survey that you may receive in the mail after your visit with us. Thank you!             Your Updated Medication List - Protect others around you: Learn how to safely use, store and throw away your medicines at www.disposemymeds.org.          This list is accurate as of 3/28/18  1:21 PM.  Always use your most recent med list.                   Brand Name Dispense Instructions for use Diagnosis    acetaminophen 500 MG tablet    TYLENOL     Take 1,000 mg by mouth every 6 hours as needed for mild pain        apixaban ANTICOAGULANT 5 MG tablet    ELIQUIS    180 tablet    Take 1 tablet (5 mg) by mouth 2 times daily    Atrial fibrillation and flutter (H)       ASPIRIN PO      Take 81 mg by mouth every morning        atorvastatin 40 MG tablet    LIPITOR    90 tablet    Take 1 tablet (40 mg) by mouth daily    Coronary artery disease involving native coronary artery of native heart with angina pectoris (H)       Cyanocobalamin 2000 MCG Tabs      Take 2,000 mcg by mouth  every 7 days        digoxin 125 MCG tablet    LANOXIN    30 tablet    Take 1 tablet (125 mcg) by mouth daily    Atrial fibrillation and flutter (H)       gabapentin 300 MG capsule    NEURONTIN    60 capsule    Take 2 capsules (600 mg) by mouth every evening    Restless legs syndrome (RLS)       metoprolol succinate 25 MG 24 hr tablet    TOPROL-XL    45 tablet    Take 0.5 tablets (12.5 mg) by mouth daily    Nonrheumatic aortic valve stenosis       multivitamin Tabs tablet      Take 1 tablet by mouth 2 times daily

## 2018-03-28 NOTE — PATIENT INSTRUCTIONS
1. Labs before next visit- CBC diff, CMP, IFXN, SPEP, light chain, B12.   Scheduled - Marily  2.  RTC MD 6 months   Scheduled - Marily  3- Continue B12 2000 mcg once a week    AVS given to patient - Marily

## 2018-03-28 NOTE — LETTER
3/28/2018         RE: Efrem Ramos  [address]  [facility] [address]        Dear Colleague,    Thank you for referring your patient, Efrem Ramos, to the [facility]. Please see a copy of my visit note below.    [facility] PHYSICIANS  HEMATOLOGY ONCOLOGY    HEMATOLOGY FOLLOWUP NOTE      DIAGNOSES:   1.  Monoclonal gammopathy of unknown significance.   2.  Anemia.   3.  History of Vocal cord cancer.      TREATMENT:     1. Vitamin B12 2000 mcg p.o. daily.      SUBJECTIVE:  Patient comes in for followup today. He has been at his baseline. No new complaints.      REVIEW OF SYSTEMS:  A complete review of systems was performed and found to be negative other than pertinent positives mentioned in History of Present Illness.     Past medical, social histories reviewed.    Meds- Reviewed.     PHYSICAL EXAMINATION:   VITAL SIGNS:  /75 (BP Location: Left arm, Patient Position: Sitting, Cuff Size: Adult Regular)  Pulse 69  Temp 98.2  F (36.8  C) (Oral)  Resp 16  Wt 82.8 kg (182 lb 9.6 oz)  SpO2 93%  BMI 27.36 kg/m2  CONSTITUTIONAL: Sitting comfortably.   HEENT: Pupils are equal. Oropharynx is clear.   NECK: No cervical or supraclavicular lymphadenopathy.   RESPIRATORY: Clear bilaterally.   CARD/VASC: S1, S2, regular.   GI: Soft, nontender, nondistended, no hepatosplenomegaly.   MUSKULOSKELETAL: Warm, well perfused.   NEUROLOGIC: Alert, awake.   INTEGUMENT: No rash.   LYMPHATICS: No edema.   PSYCH: Mood and affect was normal.     LABORATORY DATA AND IMAGING REVIEWED DURING THIS VISIT:  Recent Labs   Lab Test  03/21/18   1303  09/19/17   1144   NA  141  142   POTASSIUM  3.9  3.6   CHLORIDE  108  108   CO2  28  26   ANIONGAP  5  8   BUN  8  7   CR  0.82  0.81   GLC  84  82   ULISSES  8.3*  8.7     Recent Labs   Lab Test  03/21/18   1303  09/19/17   1144  09/05/17   0950  06/08/17   1120   WBC  11.6*  11.7*  10.9  11.5*   HGB  13.0*  12.8*  13.0*  12.5*   PLT  293  315  302   338   MCV  97  94  93  96   NEUTROPHIL  64.5  71.7   --   69.7     Recent Labs   Lab Test  03/21/18   1303  09/19/17   1144  08/24/17   1206  05/23/17   1311   09/16/13   1350  08/20/13   1509   BILITOTAL  0.9  1.0   --   0.9   < >  0.9  0.7   ALKPHOS  125  127   --   90   < >  90  109   ALT  14  19  <5*  14   < >  28  28   AST  19  17   --   12   < >  30  28   ALBUMIN  3.0*  3.0*   --   3.4   < >  4.1  3.8   LDH   --    --    --    --    --   453  434    < > = values in this interval not displayed.   Results for ADORE, PHANI JULIA (MRN [id number]) as of 3/28/2018 13:06   Ref. Range 9/19/2017 11:44 3/21/2018 13:03   Gamma Fraction Latest Ref Range: 0.7 - 1.6 g/dL 1.6 1.8 (H)   IGA Latest Ref Range: 70 - 380 mg/dL 367 419 (H)   IGG Latest Ref Range: 695 - 1620 mg/dL 1390 1660 (H)   IGM Latest Ref Range: 60 - 265 mg/dL 228 234   Immunofixation ELP Unknown (Note) (Note)   Kappa Free Lt Chain Latest Ref Range: 0.33 - 1.94 mg/dL 2.89 (H) 3.51 (H)   Kappa Lambda Ratio Latest Ref Range: 0.26 - 1.65  1.01 1.42   Lambda Free Lt Chain Latest Ref Range: 0.57 - 2.63 mg/dL 2.85 (H) 2.48   Monoclonal Peak Latest Ref Range: 0.0 g/dL 0.4 (H) 0.4 (H)     ECOG PS: 1    ASSESSMENT:   1- This is a 72-year-old gentleman with monoclonal gammopathy of unknown significance.    Bone marrow biopsy 6/8/17- normo cellular marrow with 0.3% monoclonal plasma cells. Normal flow. One (5%) of the 20 metaphase cells analyzed had a hyperdiploid karyotype   with gains of one extra copy each of chromosomes 5, 7, 9, 15, and 19, and loss of one copy each of chromosomes 13 and 22. 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   [facility]- M spike of 0.4 grams , no end organ damage.     PLAN:     1. Labs before next visit- CBC diff, CMP, IFXN, SPEP, light chain, B12.   2.  RTC MD 6 months  3- Continue B12 2000 mcg once a week  CASSIDY MAYO MD    3/28/2018    CC: Danny Paige MD          Oncology Rooming Note    March 28, 2018  "1:06 PM   Efrem Ramos is a 72 year old male who presents for:    Chief Complaint   Patient presents with     Oncology Clinic Visit     CAD (coronary artery disease)-moderate nonobstructive 2 vessel     Initial Vitals: /75 (BP Location: Left arm, Patient Position: Sitting, Cuff Size: Adult Regular)  Pulse 69  Temp 98.2  F (36.8  C) (Oral)  Resp 16  Wt 82.8 kg (182 lb 9.6 oz)  SpO2 93%  BMI 27.36 kg/m2 Estimated body mass index is 27.36 kg/(m^2) as calculated from the following:    Height as of 3/20/18: 1.74 m (5' 8.5\").    Weight as of this encounter: 82.8 kg (182 lb 9.6 oz). Body surface area is 2 meters squared.  No Pain (0) Comment: Data Unavailable   No LMP for male patient.  Allergies reviewed: Yes  Medications reviewed: Yes    Medications: Medication refills not needed today.  Pharmacy name entered into [facility]:    [id number] - [facility] [address] AT [address] PHARMACY [place], [place] - [address]  [facility] [address] AT [address] [place] [address]    Clinical concerns: no    5 minutes for nursing intake (face to face time)          Lala Sanchez MA              Again, thank you for allowing me to participate in the care of your patient.        Sincerely,        Shae Aldana MD    "

## 2018-05-03 ENCOUNTER — THERAPY VISIT (OUTPATIENT)
Dept: SLEEP MEDICINE | Facility: CLINIC | Age: 73
End: 2018-05-03
Payer: MEDICARE

## 2018-05-03 DIAGNOSIS — G47.33 OSA (OBSTRUCTIVE SLEEP APNEA): ICD-10-CM

## 2018-05-03 DIAGNOSIS — G25.81 RESTLESS LEGS SYNDROME (RLS): ICD-10-CM

## 2018-05-03 DIAGNOSIS — G47.61 PLMD (PERIODIC LIMB MOVEMENT DISORDER): ICD-10-CM

## 2018-05-03 PROCEDURE — 95811 POLYSOM 6/>YRS CPAP 4/> PARM: CPT | Performed by: INTERNAL MEDICINE

## 2018-05-03 NOTE — MR AVS SNAPSHOT
After Visit Summary   5/3/2018    Efrem Ramos    MRN: [id number]           Patient Information     Date Of Birth          1945        Visit Information        Provider Department      5/3/2018 8:30 PM BED 6  SLEEP [place] [facility]        Today's Diagnoses     MEL (obstructive sleep apnea)        PLMD (periodic limb movement disorder)        Restless legs syndrome (RLS)           Follow-ups after your visit        Your next 10 appointments already scheduled     May 11, 2018  1:45 PM CDT   Return Visit with Bella Chavis MD   [facility] ([facility])    [address]  [place] [address]   [phone] OPT 2            May 16, 2018  1:30 PM CDT   Return Sleep Patient with Ron Pacheco MD   [facility] ([facility] [place])    [address]  [place] [address]   [phone]            Sep 19, 2018  1:15 PM CDT   Return Visit with  Oncology Nurse   [facility] ([facility])    [facility]  [address]  [place] [address]   [phone]            Sep 26, 2018  1:00 PM CDT   Return Visit with Shae Aldana MD   [facility] ([facility])    [facility]  [address]  [place] [address]   [phone]              Who to contact     If you have questions or need follow up information about today's clinic visit or your schedule please contact [facility] directly at [phone].  Normal or non-critical lab and imaging results will be communicated to you by MyChart, letter or phone within 4 business days after the clinic has received the results. If you do not hear from us within 7 days, please contact the clinic through MyChart or phone. If you have a critical or abnormal lab result, we will notify you by phone as soon as  possible.  Submit refill requests through WineSimple or call your pharmacy and they will forward the refill request to us. Please allow 3 business days for your refill to be completed.          Additional Information About Your Visit        Kaixin001hart Information     WineSimple gives you secure access to your electronic health record. If you see a primary care provider, you can also send messages to your care team and make appointments. If you have questions, please call your primary care clinic.  If you do not have a primary care provider, please call [phone] and they will assist you.        Care EveryWhere ID     This is your Care EveryWhere ID. This could be used by other organizations to access your [place] medical records  [id number]         Blood Pressure from Last 3 Encounters:   03/28/18 118/75   03/20/18 131/84   09/27/17 104/66    Weight from Last 3 Encounters:   03/28/18 82.8 kg (182 lb 9.6 oz)   03/20/18 83.9 kg (185 lb)   09/27/17 78.7 kg (173 lb 6.4 oz)              We Performed the Following     Comprehensive Sleep Study        Primary Care Provider Office Phone # Fax #    Danny Paige [phone] [address]  [facility] [address]        Equal Access to Services     JAMEEL HARRIS : Hadii aad ku hadasho Soomaali, waaxda luqadaha, qaybta kaalmada adeegyada, waxay jose olvera . So [facility] [phone].    ATENCIÓN: Si habla español, tiene a palomo disposición servicios gratuitos de asistencia lingüística. Llame al [phone].    We comply with applicable federal civil rights laws and [place] laws. We do not discriminate on the basis of race, color, national origin, age, disability, sex, sexual orientation, or gender identity.            Thank you!     Thank you for choosing [facility]  for your care. Our goal is always to provide you with excellent care. Hearing back from our patients is one way we can continue to improve our  services. Please take a few minutes to complete the written survey that you may receive in the mail after your visit with us. Thank you!             Your Updated Medication List - Protect others around you: Learn how to safely use, store and throw away your medicines at www.disposemymeds.org.          This list is accurate as of 5/3/18 11:59 PM.  Always use your most recent med list.                   Brand Name Dispense Instructions for use Diagnosis    acetaminophen 500 MG tablet    TYLENOL     Take 1,000 mg by mouth every 6 hours as needed for mild pain        apixaban ANTICOAGULANT 5 MG tablet    ELIQUIS    180 tablet    Take 1 tablet (5 mg) by mouth 2 times daily    Atrial fibrillation and flutter (H)       ASPIRIN PO      Take 81 mg by mouth every morning        atorvastatin 40 MG tablet    LIPITOR    90 tablet    Take 1 tablet (40 mg) by mouth daily    Coronary artery disease involving native coronary artery of native heart with angina pectoris (H)       Cyanocobalamin 2000 MCG Tabs      Take 2,000 mcg by mouth every 7 days        digoxin 125 MCG tablet    LANOXIN    30 tablet    Take 1 tablet (125 mcg) by mouth daily    Atrial fibrillation and flutter (H)       gabapentin 300 MG capsule    NEURONTIN    60 capsule    Take 2 capsules (600 mg) by mouth every evening    Restless legs syndrome (RLS)       metoprolol succinate 25 MG 24 hr tablet    TOPROL-XL    45 tablet    Take 0.5 tablets (12.5 mg) by mouth daily    Nonrheumatic aortic valve stenosis       multivitamin Tabs tablet      Take 1 tablet by mouth 2 times daily

## 2018-05-04 NOTE — PROGRESS NOTES
Completed a all night titration PSG per provider order.    Preliminary AHI >30.  A final therapeutic PAP pressure was achieved.    Supine REM was seen on therapeutic pressure.    Patient reports feeling refreshed in AM.

## 2018-05-11 ENCOUNTER — OFFICE VISIT (OUTPATIENT)
Dept: CARDIOLOGY | Facility: CLINIC | Age: 73
End: 2018-05-11
Attending: PHYSICIAN ASSISTANT
Payer: MEDICARE

## 2018-05-11 VITALS
HEART RATE: 63 BPM | WEIGHT: 180 LBS | DIASTOLIC BLOOD PRESSURE: 75 MMHG | BODY MASS INDEX: 26.66 KG/M2 | SYSTOLIC BLOOD PRESSURE: 126 MMHG | HEIGHT: 69 IN

## 2018-05-11 DIAGNOSIS — I25.810 CORONARY ARTERY DISEASE INVOLVING CORONARY BYPASS GRAFT OF NATIVE HEART WITHOUT ANGINA PECTORIS: ICD-10-CM

## 2018-05-11 DIAGNOSIS — I48.0 PAROXYSMAL ATRIAL FIBRILLATION (H): ICD-10-CM

## 2018-05-11 DIAGNOSIS — I35.0 AORTIC VALVE STENOSIS, ETIOLOGY OF CARDIAC VALVE DISEASE UNSPECIFIED: Primary | ICD-10-CM

## 2018-05-11 PROCEDURE — 93000 ELECTROCARDIOGRAM COMPLETE: CPT | Performed by: INTERNAL MEDICINE

## 2018-05-11 PROCEDURE — 99214 OFFICE O/P EST MOD 30 MIN: CPT | Performed by: INTERNAL MEDICINE

## 2018-05-11 NOTE — PROCEDURES
"SLEEP STUDY INTERPRETATION  TITRATION STUDY      Patient: PHANI AVITIA  YOB: 1945  Study Date: 5/3/2018  MRN: [id number]  Referring Provider: MD Paige Brian  Ordering Provider: MD Pacheco Rakesh    Indications for Polysomnography: The patient is a 72 y old Male who is 5' 9\" and weighs 185.0 lbs. His BMI is 27.7, Sanders sleepiness scale 6.0 and neck circumference is 0.0 cm. Relevant medical history includes severe MEL with AHI 43.4 from PSG on 7/20/2015. A polysomnogram with PAP titration was performed to correct for sleep apnea.    Polysomnogram Data: A full night polysomnogram recorded the standard physiologic parameters including EEG, EOG, EMG, ECG, nasal and oral airflow. Respiratory parameters of chest and abdominal movements were recorded with respiratory inductance plethysmography. Oxygen saturation was recorded by pulse oximetry. Hypopnea scoring rule used: 1B 4%.    Treatment PSG  Sleep Architecture: Sleep was fragmented.   The total recording time of the polysomnogram was 395.3 minutes. The total sleep time was 275.0 minutes. Sleep latency was normal at 13.0 minutes without the use of a sleep aid. REM latency was 127.5 minutes. Arousal index was increased at 22.3 arousals per hour. Sleep efficiency was decreased at 69.6%. Wake after sleep onset was 101.0 minutes. The patient spent 13.6% of total sleep time in Stage N1, 43.8% in Stage N2, 28.7% in Stage N3, and 13.8% in REM. Time in REM supine was 38.0 minutes.      Respiration: CPAP effectively treated obstructive respiratory events. A few treatment emergent central apneas that were noted were present during a period of sleep fragmentation. .     The patient was titrated at pressures ranging from CPAP 6 cmH2O up to CPAP 11 cmH2O. The final pressure achieved was CPAP 11 cmH2O with a residual AHI of 12.7 events per hour. Time in REM supine on final pressure was 26.5 minutes.     This titration was considered :  - Adequate (residual AHI " with 75% decrease or above constraints without REM supine sleep at final pressure).    Snoring - was reported as mild on treatment.    Respiratory rate and pattern - was notable for normal respiratory rate and pattern.    Sustained Sleep Associated Hypoventilation - Transcutaneous carbon dioxide monitoring was not used; however significant hypoventilation was not suggested by oximetry.    Sleep Associated Hypoxemia - (Greater than 5 minutes O2 sat at or below 88%) was not present. Baseline oxygen saturation was 94.2%. Lowest oxygen saturation was 78.8%. Time spent less than or equal to 88% was 0.7 minutes. Time spent less than or equal to 89% was 1.2 minutes.    Movement Activity: There was an increased frequency of periodic limb movements. However, majority of the movements were not associated with EEG arousals.     Periodic Limb Activity - There were 239 PLMs during the entire study. The PLM index was 52.1 movements per hour. The PLM Arousal Index was 0.4 per hour.    REM EMG Activity - Excessive transient/sustained muscle activity was not present.    Nocturnal Behavior - Abnormal sleep related behaviors were not noted during/arising out of NREM / REM sleep.     Bruxism - None apparent.    Cardiac Summary: Sinus rhythm.   The average pulse rate was 50.7 bpm. The minimum pulse rate was 42.9 bpm while the maximum pulse rate was 71.3 bpm. Arrhythmias were noted.      Assessment:     This titration PSG shows adequate treatment of patient s severe obstructive sleep apnea with CPAP. A pressure of 11 cm H2O was adequate. Obstructive respiratory events were adequately treated at this pressure.   Recommendations:    Treatment of MEL with Auto?titrating PAP therapy with a range of 10 cmH2O to 14  cmH2O. Recommend clinical follow up with sleep management team, including review of compliance measures.    Pharmacologic therapy should be used for management of restless legs syndrome only if present and clinically indicated and  not based on the presence of periodic limb movements alone.    Diagnostic Codes:   Obstructive Sleep Apnea G47.33  Repetitive Intrusions Into Sleep F51.8    5/3/2018 [facility] Titration Sleep Study (185.0 lbs) - Treatment was titrated to a pressure of CPAP 11 with an AHI of 12.7. Time Spent in REM supine at this pressure was 26.5.      _____________________________________   Electronically Signed By: Ron Pacheco MD 05/10/2018

## 2018-05-11 NOTE — LETTER
5/11/2018    Danny Paige MD, MD  [address]  [facility] [address]    RE: Efrem Ramos       Dear Colleague,    I had the pleasure of seeing Efrem Ramos in the [facility].    DIAGNOSES:      Encounter Diagnoses   Name Primary?     Coronary artery disease involving coronary bypass graft of native heart without angina pectoris      Aortic valve stenosis, etiology of cardiac valve disease unspecified Yes     Paroxysmal atrial fibrillation (H)           HPI:  See [id number]        MEDICATIONS:    Current Outpatient Prescriptions   Medication Sig Dispense Refill     acetaminophen (TYLENOL) 500 MG tablet Take 1,000 mg by mouth every 6 hours as needed for mild pain       apixaban ANTICOAGULANT (ELIQUIS) 5 MG tablet Take 1 tablet (5 mg) by mouth 2 times daily 180 tablet 2     ASPIRIN PO Take 81 mg by mouth every morning        atorvastatin (LIPITOR) 40 MG tablet Take 1 tablet (40 mg) by mouth daily 90 tablet 3     Cyanocobalamin 2000 MCG TABS Take 2,000 mcg by mouth every 7 days       gabapentin (NEURONTIN) 300 MG capsule Take 2 capsules (600 mg) by mouth every evening 60 capsule 5     metoprolol succinate (TOPROL-XL) 25 MG 24 hr tablet Take 0.5 tablets (12.5 mg) by mouth daily 45 tablet 2     multivitamin (OCUVITE) TABS Take 1 tablet by mouth 2 times daily            ALLERGIES     Allergies   Allergen Reactions     Adhesive Tape Blisters     Amiodarone      Lasix [Furosemide] Other (See Comments)     Throat swelling, wheezing     Penicillins Unknown     Sulfa Drugs Difficulty breathing       PAST MEDICAL HISTORY:  Past Medical History:   Diagnosis Date     Atrial fibrillation (H)      Cancer (H) vocal cord     Carpal tunnel syndrome     abstracted 7/3/02.     CVA (cerebral infarction) 5/5/2015     Demyelinating disease of central nervous system, unspecified     abstracted 7/3/02.     Dyspnea      ENCEPHALOPATHY UNSPECIFIED  3/15/2005    acute diseminated  encephalitis     Mixed hyperlipidemia 3/15/2005     Other and unspecified noninfectious gastroenteritis and colitis(558.9)     abstracted 7/3/02.     Pneumonia 8/17/2016     Redundant colon     needs CT colonography     S/P coronary angiogram 09/05/2017     Shingles      SKIN DISORDERS NEC 3/15/2005     Sleep apnea        PAST SURGICAL HISTORY:  Past Surgical History:   Procedure Laterality Date     BIOPSY  brain 2002     BONE MARROW BIOPSY, BONE SPECIMEN, NEEDLE/TROCAR N/A 6/8/2017    Procedure: BIOPSY BONE MARROW;  UNILATERAL BONE MARROW BIOPSY (CONSCIOUS SEDATION) ;  Surgeon: Jamie Gonzales MD;  Location:  GI     C NONSPECIFIC PROCEDURE  as a child    T & A. abstracted 7/3/02.     C NONSPECIFIC PROCEDURE  early    CTR. abstracted 7/3/02.     HEAD & NECK SURGERY       ORTHOPEDIC SURGERY      right arm ulna reset after injury     THORACOSCOPIC WEDGE RESECTION LUNG Right 8/2/2016    Procedure: THORACOSCOPIC WEDGE RESECTION LUNG;  Surgeon: Abdelrahman Noriega MD;  Location:  OR       FAMILY HISTORY:  Family History   Problem Relation Age of Onset     Blood Disease Mother      Anemia     Cardiovascular Father      Cancer - colorectal Maternal Grandfather      Hypertension Brother      DIABETES Sister 5     Juvinile Diabetes passed at 36     Respiratory Other      Lung Cancer       SOCIAL HISTORY:  Social History     Social History     Marital status:      Spouse name: N/A     Number of children: N/A     Years of education: N/A     Social History Main Topics     Smoking status: Former Smoker     Packs/day: 1.00     Years: 30.00     Types: Cigarettes     Quit date: 7/26/2013     Smokeless tobacco: Never Used     Alcohol use 25.2 oz/week     42 Standard drinks or equivalent per week      Comment: 2 beers per day     Drug use: No     Sexual activity: Not Currently     Other Topics Concern     Caffeine Concern Yes     1 Coke occasionally      Special Diet No     Exercise No     Social History Narrative  "      Review of Systems:  Skin:  not assessed rash   Eyes:  Positive for glasses  ENT:  Negative postnasal drainage;hearing loss  Respiratory:  Positive for dyspnea on exertion;cough;wheezing  Cardiovascular:  Negative for;palpitations;chest pain;edema Positive for;lightheadedness;fatigue;dizziness;chest pain  Gastroenterology: Positive for poor appetite  Genitourinary:  Positive for urgency  Musculoskeletal:  Positive for joint pain;arthritis  Neurologic:  Positive for stroke;incoordination  Psychiatric:  Positive for sleep disturbances  Heme/Lymph/Imm:  Positive for    Endocrine:  Negative      Physical Exam:  Vitals: /75  Pulse 63  Ht 1.74 m (5' 8.5\")  Wt 81.6 kg (180 lb)  BMI 26.97 kg/m2    Constitutional:  cooperative, alert and oriented, well developed, well nourished, in no acute distress        Skin:  warm and dry to the touch, no apparent skin lesions or masses noted        Head:  normocephalic, no masses or lesions        Eyes:  conjunctivae and lids unremarkable;sclera white        ENT:  no pallor or cyanosis, dentition good        Neck:  carotid pulses are full and equal bilaterally;JVP normal;no carotid bruit        Chest:  clear to auscultation;normal symmetry;no tenderness to palpation;normal respiratory excursion;no intercostal retraction;no use of accessory muscles        Cardiac: regular rhythm;no S3 or S4       systolic ejection murmur;grade 2;grade 3;LUSB          Abdomen:  abdomen soft;BS normoactive;non-tender        Vascular:   pulses below the femoral arteries are diminished                                      Extremities and Back:  no deformities, clubbing, cyanosis, erythema observed   bilateral LE edema;trace          Neurological:  no gross motor deficits;affect appropriate          ASSESSMENT AND PLAN:    See dictation    ORDERS AT TODAY'S VISIT:    Orders Placed This Encounter   Procedures     Lipid Profile     TSH with free T4 reflex     CARDIOLOGY EVAL ADULT REFERRAL     " Follow-Up with Cardiac Advanced Practice Provider     EKG 12-lead complete w/read - Clinics (performed today)     Echocardiogram Complete     Echocardiogram Complete           CC  Cherise Raymond PA-C  [address]  [place] [address]              Thank you for allowing me to participate in the care of your patient.      Sincerely,     Bella Chavis MD     [facility]    cc:   Cherise Raymond PA-C  [address]  [place] [address]

## 2018-05-11 NOTE — LETTER
5/11/2018      Danny Paige MD, MD  [address]  [facility] [address]      RE: Efrem Ramos       Dear Colleague,    I had the pleasure of seeing Efrem Ramos in the [facility].    Service Date: 05/11/2018      REASON FOR VISIT:  Followup visit.      HISTORY OF PRESENT ILLNESS:  Mr. Ramos is a pleasant 72-year-old gentleman who comes in routine followup.  He has a history of a stroke with visual changes as well as atrial fibrillation and flutter for which he had previously been on amiodarone.  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      Mr. Ramos also has moderate aortic stenosis, acute disseminated encephalomyelitis and sleep apnea which was previously untreated.  He states he has repeated a sleep study recently as his physician is looking for a different mask which would be more comfortable to wear.  He states he is very fatigued during (?) exertion is clearly limited.      He also has a monoclonal gammopathy of unknown significance and follows with Dr. Aldana.  He has a history of peripheral vascular disease for which he sees Dr. Hoff.  He was told that he is not a candidate for bypass grafting due to his anatomy.      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      Mr. Ramos had complained of significant shortness of breath and underwent stress testing demonstrated transient ischemic dilatation, proceeding with cardiac catheterization demonstrating intermediate lesions, the worst of which was in the 50%-60% RCA lesion with a normal iFR.  He had only mildly elevated pulmonary pressures on a right heart cath in 2016 and mild mitral stenosis as well.      Mr. Ramos has chronic stable dyspnea, though is not clearly limiting.  He can get occasionally  lightheaded with sudden changes in position only.  He does not have any chest pain, palpitations, orthopnea, PND or pedal edema.      I have reviewed his medications today as well as heart rates on his EKGs which are really quite low.  He is in sinus bradycardia today with a rate of 52 beats per minute.      I would like to discontinue his digoxin given its poor effectiveness for heart rate control in atrial fibrillation.      We will also follow up lipids and a TSH (the latter, given his fatigue).  We will follow up an echocardiogram as well for structural evaluation.      We had discussed possibly light compression stockings for his mild edema; however, given the significant peripheral vascular disease, the extent of which is not entirely known to me, I would defer any consideration of compression stockings to his vascular surgeons and have told him I would ask their opinion with respect to whether it would be useful to have even light compression for trying them.  My recommendation, though, is for limiting his salt intake to 2500 mg a day of sodium or less.  He has verbalized understanding and agrees.  We will see him back with the results of the echo and labs.      Total time is 30 minutes, 25 in coordination of care and counseling.      Luís Chavis MD      cc:   Danny Paige MD    [address], [address]    [place] [address]         LUÍS CHAVIS MD             D: 2018   T: 2018   MT: COLLEEN      Name:     PHANI AVITIA   MRN:      [id number]        Account:      [id number]   :      1945           Service Date: 2018      Document: [id number]           Outpatient Encounter Prescriptions as of 2018   Medication Sig Dispense Refill     acetaminophen (TYLENOL) 500 MG tablet Take 1,000 mg by mouth every 6 hours as needed for mild pain       apixaban ANTICOAGULANT (ELIQUIS) 5 MG tablet Take 1 tablet (5 mg) by mouth 2 times  daily 180 tablet 2     ASPIRIN PO Take 81 mg by mouth every morning        atorvastatin (LIPITOR) 40 MG tablet Take 1 tablet (40 mg) by mouth daily 90 tablet 3     Cyanocobalamin 2000 MCG TABS Take 2,000 mcg by mouth every 7 days       gabapentin (NEURONTIN) 300 MG capsule Take 2 capsules (600 mg) by mouth every evening 60 capsule 5     metoprolol succinate (TOPROL-XL) 25 MG 24 hr tablet Take 0.5 tablets (12.5 mg) by mouth daily 45 tablet 2     multivitamin (OCUVITE) TABS Take 1 tablet by mouth 2 times daily        [DISCONTINUED] digoxin (LANOXIN) 125 MCG tablet Take 1 tablet (125 mcg) by mouth daily 30 tablet 11     No facility-administered encounter medications on file as of 5/11/2018.        Again, thank you for allowing me to participate in the care of your patient.      Sincerely,    Bella Chavis MD     [facility]

## 2018-05-11 NOTE — PROGRESS NOTES
DIAGNOSES:      Encounter Diagnoses   Name Primary?     Coronary artery disease involving coronary bypass graft of native heart without angina pectoris      Aortic valve stenosis, etiology of cardiac valve disease unspecified Yes     Paroxysmal atrial fibrillation (H)           HPI:  See [id number]        MEDICATIONS:    Current Outpatient Prescriptions   Medication Sig Dispense Refill     acetaminophen (TYLENOL) 500 MG tablet Take 1,000 mg by mouth every 6 hours as needed for mild pain       apixaban ANTICOAGULANT (ELIQUIS) 5 MG tablet Take 1 tablet (5 mg) by mouth 2 times daily 180 tablet 2     ASPIRIN PO Take 81 mg by mouth every morning        atorvastatin (LIPITOR) 40 MG tablet Take 1 tablet (40 mg) by mouth daily 90 tablet 3     Cyanocobalamin 2000 MCG TABS Take 2,000 mcg by mouth every 7 days       gabapentin (NEURONTIN) 300 MG capsule Take 2 capsules (600 mg) by mouth every evening 60 capsule 5     metoprolol succinate (TOPROL-XL) 25 MG 24 hr tablet Take 0.5 tablets (12.5 mg) by mouth daily 45 tablet 2     multivitamin (OCUVITE) TABS Take 1 tablet by mouth 2 times daily            ALLERGIES     Allergies   Allergen Reactions     Adhesive Tape Blisters     Amiodarone      Lasix [Furosemide] Other (See Comments)     Throat swelling, wheezing     Penicillins Unknown     Sulfa Drugs Difficulty breathing       PAST MEDICAL HISTORY:  Past Medical History:   Diagnosis Date     Atrial fibrillation (H)      Cancer (H) vocal cord     Carpal tunnel syndrome     abstracted 7/3/02.     CVA (cerebral infarction) 5/5/2015     Demyelinating disease of central nervous system, unspecified     abstracted 7/3/02.     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     Pneumonia 8/17/2016     Redundant colon     needs CT colonography     S/P coronary  angiogram 09/05/2017     Shingles      SKIN DISORDERS NEC 3/15/2005     Sleep apnea        PAST SURGICAL HISTORY:  Past Surgical History:   Procedure Laterality Date     BIOPSY  brain 2002     BONE MARROW BIOPSY, BONE SPECIMEN, NEEDLE/TROCAR N/A 6/8/2017    Procedure: BIOPSY BONE MARROW;  UNILATERAL BONE MARROW BIOPSY (CONSCIOUS SEDATION) ;  Surgeon: Jamie Gonzales MD;  Location:  GI     C NONSPECIFIC PROCEDURE  as a child    T & A. abstracted 7/3/02.     C NONSPECIFIC PROCEDURE  early    CTR. abstracted 7/3/02.     HEAD & NECK SURGERY       ORTHOPEDIC SURGERY      right arm ulna reset after injury     THORACOSCOPIC WEDGE RESECTION LUNG Right 8/2/2016    Procedure: THORACOSCOPIC WEDGE RESECTION LUNG;  Surgeon: Abdelrahman Noriega MD;  Location:  OR       FAMILY HISTORY:  Family History   Problem Relation Age of Onset     Blood Disease Mother      Anemia     Cardiovascular Father      Cancer - colorectal Maternal Grandfather      Hypertension Brother      DIABETES Sister 5     Juvinile Diabetes passed at 36     Respiratory Other      Lung Cancer       SOCIAL HISTORY:  Social History     Social History     Marital status:      Spouse name: N/A     Number of children: N/A     Years of education: N/A     Social History Main Topics     Smoking status: Former Smoker     Packs/day: 1.00     Years: 30.00     Types: Cigarettes     Quit date: 7/26/2013     Smokeless tobacco: Never Used     Alcohol use 25.2 oz/week     42 Standard drinks or equivalent per week      Comment: 2 beers per day     Drug use: No     Sexual activity: Not Currently     Other Topics Concern     Caffeine Concern Yes     1 Coke occasionally      Special Diet No     Exercise No     Social History Narrative       Review of Systems:  Skin:  not assessed rash   Eyes:  Positive for glasses  ENT:  Negative postnasal drainage;hearing loss  Respiratory:  Positive for dyspnea on exertion;cough;wheezing  Cardiovascular:  Negative  "for;palpitations;chest pain;edema Positive for;lightheadedness;fatigue;dizziness;chest pain  Gastroenterology: Positive for poor appetite  Genitourinary:  Positive for urgency  Musculoskeletal:  Positive for joint pain;arthritis  Neurologic:  Positive for stroke;incoordination  Psychiatric:  Positive for sleep disturbances  Heme/Lymph/Imm:  Positive for    Endocrine:  Negative      Physical Exam:  Vitals: /75  Pulse 63  Ht 1.74 m (5' 8.5\")  Wt 81.6 kg (180 lb)  BMI 26.97 kg/m2    Constitutional:  cooperative, alert and oriented, well developed, well nourished, in no acute distress        Skin:  warm and dry to the touch, no apparent skin lesions or masses noted        Head:  normocephalic, no masses or lesions        Eyes:  conjunctivae and lids unremarkable;sclera white        ENT:  no pallor or cyanosis, dentition good        Neck:  carotid pulses are full and equal bilaterally;JVP normal;no carotid bruit        Chest:  clear to auscultation;normal symmetry;no tenderness to palpation;normal respiratory excursion;no intercostal retraction;no use of accessory muscles        Cardiac: regular rhythm;no S3 or S4       systolic ejection murmur;grade 2;grade 3;LUSB          Abdomen:  abdomen soft;BS normoactive;non-tender        Vascular:   pulses below the femoral arteries are diminished                                      Extremities and Back:  no deformities, clubbing, cyanosis, erythema observed   bilateral LE edema;trace          Neurological:  no gross motor deficits;affect appropriate          ASSESSMENT AND PLAN:    See dictation    ORDERS AT TODAY'S VISIT:    Orders Placed This Encounter   Procedures     Lipid Profile     TSH with free T4 reflex     CARDIOLOGY EVAL ADULT REFERRAL     Follow-Up with Cardiac Advanced Practice Provider     EKG 12-lead complete w/read - Clinics (performed today)     Echocardiogram Complete     Echocardiogram Complete           CC  Cherise Raymond PA-C  [address]  [place] [id number]

## 2018-05-11 NOTE — LETTER
5/11/2018      Danny Paige MD, MD  [address]  [facility] [address]      RE: Efrem Ramos       Dear Colleague,    I had the pleasure of seeing Efrem Ramos in the [facility].    Service Date: 05/11/2018      REASON FOR VISIT:  Followup visit.      HISTORY OF PRESENT ILLNESS:  Mr. Ramos is a pleasant 72-year-old gentleman who comes in routine followup.  He has a history of a stroke with visual changes as well as atrial fibrillation and flutter for which he had previously been on amiodarone.  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      Mr. Ramos also has moderate aortic stenosis, acute disseminated encephalomyelitis and sleep apnea which was previously untreated.  He states he has repeated a sleep study recently as his physician is looking for a different mask which would be more comfortable to wear.  He states he is very fatigued during *** exertion is clearly limited.      He also has a monoclonal gammopathy of unknown significance and follows with Dr. Aldana.  He has a history of peripheral vascular disease for which he sees Dr. Hoff.  He was told that he is not a candidate for bypass grafting due to his anatomy.      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      Mr. Ramos had complained of significant shortness of breath and underwent stress testing demonstrated transient ischemic dilatation, proceeding with cardiac catheterization demonstrating intermediate lesions, the worst of which was in the 50%-60% RCA lesion with a normal iFR.  He had only mildly elevated pulmonary pressures on a right heart cath in 2016 and mild mitral stenosis as well.      Mr. Ramos has chronic stable dyspnea, though is not clearly limiting.  He can get occasionally  lightheaded with sudden changes in position only.  He does not have any chest pain, palpitations, orthopnea, PND or pedal edema.      I have reviewed his medications today as well as heart rates on his EKGs which are really quite low.  He is in sinus bradycardia today with a rate of 52 beats per minute.      I would like to discontinue his digoxin given its poor effectiveness for heart rate control in atrial fibrillation.      We will also follow up lipids and a TSH (the latter, given his fatigue).  We will follow up an echocardiogram as well for structural evaluation.      We had discussed possibly light compression stockings for his mild edema; however, given the significant peripheral vascular disease, the extent of which is not entirely known to me, I would defer any consideration of compression stockings to his vascular surgeons and have told him I would ask their opinion with respect to whether it would be useful to have even light compression for trying them.  My recommendation, though, is for limiting his salt intake to 2500 mg a day of sodium or less.  He has verbalized understanding and agrees.  We will see him back with the results of the echo and labs.      Total time is 30 minutes, 25 in coordination of care and counseling.      Luís Chavis MD      cc:   Danny Paige MD    [address], [address]    [place] [address]         LUÍS CHAVIS MD             D: 2018   T: 2018   MT: COLLEEN      Name:     PHANI AVITIA   MRN:      [id number]        Account:      [id number]   :      1945           Service Date: 2018      Document: [id number]         Outpatient Encounter Prescriptions as of 2018   Medication Sig Dispense Refill     acetaminophen (TYLENOL) 500 MG tablet Take 1,000 mg by mouth every 6 hours as needed for mild pain       apixaban ANTICOAGULANT (ELIQUIS) 5 MG tablet Take 1 tablet (5 mg) by mouth 2 times daily  180 tablet 2     ASPIRIN PO Take 81 mg by mouth every morning        atorvastatin (LIPITOR) 40 MG tablet Take 1 tablet (40 mg) by mouth daily 90 tablet 3     Cyanocobalamin 2000 MCG TABS Take 2,000 mcg by mouth every 7 days       gabapentin (NEURONTIN) 300 MG capsule Take 2 capsules (600 mg) by mouth every evening 60 capsule 5     metoprolol succinate (TOPROL-XL) 25 MG 24 hr tablet Take 0.5 tablets (12.5 mg) by mouth daily 45 tablet 2     multivitamin (OCUVITE) TABS Take 1 tablet by mouth 2 times daily        [DISCONTINUED] digoxin (LANOXIN) 125 MCG tablet Take 1 tablet (125 mcg) by mouth daily 30 tablet 11     No facility-administered encounter medications on file as of 5/11/2018.        Again, thank you for allowing me to participate in the care of your patient.      Sincerely,    Bella Chavis MD     [facility]

## 2018-05-11 NOTE — LETTER
5/11/2018      Danny Paige MD, MD  [address]  [facility] [address]      RE: Efrem Ramos       Dear Colleague,    I had the pleasure of seeing Efrem Ramos in the [facility].    Service Date: 05/11/2018      REASON FOR VISIT:  Followup visit.      HISTORY OF PRESENT ILLNESS:  Mr. Ramos is a pleasant 72-year-old gentleman who comes in routine followup.  He has a history of a stroke with visual changes as well as atrial fibrillation and flutter for which he had previously been on amiodarone.  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      Mr. Ramos also has moderate aortic stenosis, acute disseminated encephalomyelitis and sleep apnea which was previously untreated.  He states he has repeated a sleep study recently as his physician is looking for a different mask which would be more comfortable to wear.  He states he is very fatigued during *** exertion is clearly limited.      He also has a monoclonal gammopathy of unknown significance and follows with Dr. Aldana.  He has a history of peripheral vascular disease for which he sees Dr. Hoff.  He was told that he is not a candidate for bypass grafting due to his anatomy.      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      Mr. Ramos had complained of significant shortness of breath and underwent stress testing demonstrated transient ischemic dilatation, proceeding with cardiac catheterization demonstrating intermediate lesions, the worst of which was in the 50%-60% RCA lesion with a normal iFR.  He had only mildly elevated pulmonary pressures on a right heart cath in 2016 and mild mitral stenosis as well.      Mr. Ramos has chronic stable dyspnea, though is not clearly limiting.  He can get occasionally  lightheaded with sudden changes in position only.  He does not have any chest pain, palpitations, orthopnea, PND or pedal edema.      I have reviewed his medications today as well as heart rates on his EKGs which are really quite low.  He is in sinus bradycardia today with a rate of 52 beats per minute.      I would like to discontinue his digoxin given its poor effectiveness for heart rate control in atrial fibrillation.      We will also follow up lipids and a TSH (the latter, given his fatigue).  We will follow up an echocardiogram as well for structural evaluation.      We had discussed possibly light compression stockings for his mild edema; however, given the significant peripheral vascular disease, the extent of which is not entirely known to me, I would defer any consideration of compression stockings to his vascular surgeons and have told him I would ask their opinion with respect to whether it would be useful to have even light compression for trying them.  My recommendation, though, is for limiting his salt intake to 2500 mg a day of sodium or less.  He has verbalized understanding and agrees.  We will see him back with the results of the echo and labs.      Total time is 30 minutes, 25 in coordination of care and counseling.      Luís Chavis MD      cc:   Danny Paige MD    [address], [address]    [place] [address]         LUÍS CHAVIS MD             D: 2018   T: 2018   MT: COLLEEN      Name:     PHANI AVITIA   MRN:      [id number]        Account:      [id number]   :      1945           Service Date: 2018      Document: [id number]         Outpatient Encounter Prescriptions as of 2018   Medication Sig Dispense Refill     acetaminophen (TYLENOL) 500 MG tablet Take 1,000 mg by mouth every 6 hours as needed for mild pain       apixaban ANTICOAGULANT (ELIQUIS) 5 MG tablet Take 1 tablet (5 mg) by mouth 2 times daily  180 tablet 2     ASPIRIN PO Take 81 mg by mouth every morning        atorvastatin (LIPITOR) 40 MG tablet Take 1 tablet (40 mg) by mouth daily 90 tablet 3     Cyanocobalamin 2000 MCG TABS Take 2,000 mcg by mouth every 7 days       gabapentin (NEURONTIN) 300 MG capsule Take 2 capsules (600 mg) by mouth every evening 60 capsule 5     metoprolol succinate (TOPROL-XL) 25 MG 24 hr tablet Take 0.5 tablets (12.5 mg) by mouth daily 45 tablet 2     multivitamin (OCUVITE) TABS Take 1 tablet by mouth 2 times daily        [DISCONTINUED] digoxin (LANOXIN) 125 MCG tablet Take 1 tablet (125 mcg) by mouth daily 30 tablet 11     No facility-administered encounter medications on file as of 5/11/2018.        Again, thank you for allowing me to participate in the care of your patient.      Sincerely,    Bella Chavis MD     [facility]

## 2018-05-12 NOTE — PROGRESS NOTES
Service Date: 05/11/2018      REASON FOR VISIT:  Followup visit.      HISTORY OF PRESENT ILLNESS:  Mr. Ramos is a pleasant 72-year-old gentleman who comes in routine followup.  He has a history of a stroke with visual changes as well as atrial fibrillation and flutter for which he had previously been on amiodarone.  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      Mr. Ramos also has moderate aortic stenosis, acute disseminated encephalomyelitis and sleep apnea which was previously untreated.  He states he has repeated a sleep study recently as his physician is looking for a different mask which would be more comfortable to wear.  He states he is very fatigued during (?) exertion is clearly limited.      He also has a monoclonal gammopathy of unknown significance and follows with Dr. Aldana.  He has a history of peripheral vascular disease for which he sees Dr. Hoff.  He was told that he is not a candidate for bypass grafting due to his anatomy.      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      Mr. Ramos had complained of significant shortness of breath and underwent stress testing demonstrated transient ischemic dilatation, proceeding with cardiac catheterization demonstrating intermediate lesions, the worst of which was in the 50%-60% RCA lesion with a normal iFR.  He had only mildly elevated pulmonary pressures on a right heart cath in 2016 and mild mitral stenosis as well.      Mr. Ramos has chronic stable dyspnea, though is not clearly limiting.  He can get occasionally lightheaded with sudden changes in position only.  He does not have any chest pain, palpitations, orthopnea, PND or pedal edema.      I have reviewed his medications today as well as heart rates on his EKGs which are really quite low.  He is  in sinus bradycardia today with a rate of 52 beats per minute.      I would like to discontinue his digoxin given its poor effectiveness for heart rate control in atrial fibrillation.      We will also follow up lipids and a TSH (the latter, given his fatigue).  We will follow up an echocardiogram as well for structural evaluation.      We had discussed possibly light compression stockings for his mild edema; however, given the significant peripheral vascular disease, the extent of which is not entirely known to me, I would defer any consideration of compression stockings to his vascular surgeons and have told him I would ask their opinion with respect to whether it would be useful to have even light compression for trying them.  My recommendation, though, is for limiting his salt intake to 2500 mg a day of sodium or less.  He has verbalized understanding and agrees.  We will see him back with the results of the echo and labs.      Total time is 30 minutes, 25 in coordination of care and counseling.      Luís Chavis MD      cc:   Danny Paige MD    [address], [address]    [place] [address]         LUÍS CHAVIS MD             D: 2018   T: 2018   MT: COLLEEN      Name:     PHANI AVITIA   MRN:      [id number]        Account:      [id number]   :      1945           Service Date: 2018      Document: [id number]

## 2018-05-16 ENCOUNTER — OFFICE VISIT (OUTPATIENT)
Dept: SLEEP MEDICINE | Facility: CLINIC | Age: 73
End: 2018-05-16
Payer: MEDICARE

## 2018-05-16 VITALS
HEIGHT: 69 IN | HEART RATE: 70 BPM | RESPIRATION RATE: 16 BRPM | SYSTOLIC BLOOD PRESSURE: 110 MMHG | BODY MASS INDEX: 26.66 KG/M2 | OXYGEN SATURATION: 97 % | DIASTOLIC BLOOD PRESSURE: 68 MMHG | WEIGHT: 180 LBS

## 2018-05-16 DIAGNOSIS — G47.33 OSA (OBSTRUCTIVE SLEEP APNEA): Primary | ICD-10-CM

## 2018-05-16 PROCEDURE — 99213 OFFICE O/P EST LOW 20 MIN: CPT | Performed by: INTERNAL MEDICINE

## 2018-05-16 NOTE — NURSING NOTE
"Chief Complaint   Patient presents with     Study Results     Follow up psg        Initial /68  Pulse 70  Resp 16  Ht 1.74 m (5' 8.5\")  Wt 81.6 kg (180 lb)  SpO2 97%  BMI 26.97 kg/m2 Estimated body mass index is 26.97 kg/(m^2) as calculated from the following:    Height as of this encounter: 1.74 m (5' 8.5\").    Weight as of this encounter: 81.6 kg (180 lb).    Medication Reconciliation: complete  ESS 5  Yudy Felix MA    "

## 2018-05-16 NOTE — MR AVS SNAPSHOT
After Visit Summary   5/16/2018    Efrem Ramos    MRN: [id number]           Patient Information     Date Of Birth          1945        Visit Information        Provider Department      5/16/2018 1:30 PM Ron Pacheco MD [facility]        Today's Diagnoses     MEL (obstructive sleep apnea)    -  1       Follow-ups after your visit        Your next 10 appointments already scheduled     May 23, 2018  3:00 PM CDT   Ech Complete with [id number]   [facility] CV Echocardiography ([facility])    [address]  [facility] [address] [phone]           1. Please bring or wear a comfortable two-piece outfit. 2. You may eat, drink and take your normal medicines. 3. For any questions that cannot be answered, please contact the ordering physician            May 30, 2018 12:10 PM CDT   LAB with DANIELLE [facility]   [facility] [place] ([facility])    [address]  [facility] [address] [phone]           Please do not eat 10-12 hours before your appointment if you are coming in fasting for labs on lipids, cholesterol, or glucose (sugar). This does not apply to pregnant women. Water, hot tea and black coffee (with nothing added) are okay. Do not drink other fluids, diet soda or chew gum.            May 30, 2018  1:10 PM CDT   Return Visit with FABI Griffith CNP   [facility] ([facility])    [address]  [facility] [address] [phone] OPT 2            Sep 19, 2018  1:15 PM CDT   Return Visit with  Oncology Nurse   [facility] ([facility])    [facility]  [address]  [facility] [address] [phone]            Sep 26, 2018  1:00 PM CDT   Return Visit with Shae Aldana MD   [facility] ([facility])     "[facility] [place]  [address]  [place] [id number] [phone]              Who to contact     If you have questions or need follow up information about today's clinic visit or your schedule please contact [facility] [place] directly at [phone].  Normal or non-critical lab and imaging results will be communicated to you by MyChart, letter or phone within 4 business days after the clinic has received the results. If you do not hear from us within 7 days, please contact the clinic through Moodsn[employer]art or phone. If you have a critical or abnormal lab result, we will notify you by phone as soon as possible.  Submit refill requests through [employer] or call your pharmacy and they will forward the refill request to us. Please allow 3 business days for your refill to be completed.          Additional Information About Your Visit        Moodsnaphart Information     Front Row gives you secure access to your electronic health record. If you see a primary care provider, you can also send messages to your care team and make appointments. If you have questions, please call your primary care clinic.  If you do not have a primary care provider, please call [phone] and they will assist you.        Care EveryWhere ID     This is your Care EveryWhere ID. This could be used by other organizations to access your [place] medical records  [id number]        Your Vitals Were     Pulse Respirations Height Pulse Oximetry BMI (Body Mass Index)       70 16 1.74 m (5' 8.5\") 97% 26.97 kg/m2        Blood Pressure from Last 3 Encounters:   05/16/18 110/68   05/11/18 126/75   03/28/18 118/75    Weight from Last 3 Encounters:   05/16/18 81.6 kg (180 lb)   05/11/18 81.6 kg (180 lb)   03/28/18 82.8 kg (182 lb 9.6 oz)              We Performed the Following     Comprehensive DME        Primary Care Provider Office Phone # Fax #    Danny Paige [phone] [address]  [facility] [address]        Equal Access to Services     JAMEEL HARRIS : Hadii angle ku hadmeraryo Sojonasali, waaxda luqadaha, qaybta kaalmaalfonzo dickson. So [facility] [phone].    ATENCIÓN: Si habla español, tiene a palomo disposición servicios gratuitos de asistencia lingüística. Ummame al [phone].    We comply with applicable federal civil rights laws and [place] laws. We do not discriminate on the basis of race, color, national origin, age, disability, sex, sexual orientation, or gender identity.            Thank you!     Thank you for choosing [facility]  for your care. Our goal is always to provide you with excellent care. Hearing back from our patients is one way we can continue to improve our services. Please take a few minutes to complete the written survey that you may receive in the mail after your visit with us. Thank you!             Your Updated Medication List - Protect others around you: Learn how to safely use, store and throw away your medicines at www.disposemymeds.org.          This list is accurate as of 5/16/18  1:42 PM.  Always use your most recent med list.                   Brand Name Dispense Instructions for use Diagnosis    acetaminophen 500 MG tablet    TYLENOL     Take 1,000 mg by mouth every 6 hours as needed for mild pain        apixaban ANTICOAGULANT 5 MG tablet    ELIQUIS    180 tablet    Take 1 tablet (5 mg) by mouth 2 times daily    Atrial fibrillation and flutter (H)       ASPIRIN PO      Take 81 mg by mouth every morning        atorvastatin 40 MG tablet    LIPITOR    90 tablet    Take 1 tablet (40 mg) by mouth daily    Coronary artery disease involving native coronary artery of native heart with angina pectoris (H)       Cyanocobalamin 2000 MCG Tabs      Take 2,000 mcg by mouth every 7 days        gabapentin 300 MG capsule    NEURONTIN    60 capsule    Take 2 capsules (600 mg) by mouth every evening    Restless legs syndrome  (RLS)       metoprolol succinate 25 MG 24 hr tablet    TOPROL-XL    45 tablet    Take 0.5 tablets (12.5 mg) by mouth daily    Nonrheumatic aortic valve stenosis       multivitamin Tabs tablet      Take 1 tablet by mouth 2 times daily

## 2018-05-23 ENCOUNTER — HOSPITAL ENCOUNTER (OUTPATIENT)
Dept: CARDIOLOGY | Facility: CLINIC | Age: 73
End: 2018-05-23
Attending: INTERNAL MEDICINE
Payer: MEDICARE

## 2018-05-23 DIAGNOSIS — I25.810 CORONARY ARTERY DISEASE INVOLVING CORONARY BYPASS GRAFT OF NATIVE HEART WITHOUT ANGINA PECTORIS: ICD-10-CM

## 2018-05-23 PROCEDURE — 93306 TTE W/DOPPLER COMPLETE: CPT | Mod: 26 | Performed by: INTERNAL MEDICINE

## 2018-05-30 ENCOUNTER — OFFICE VISIT (OUTPATIENT)
Dept: CARDIOLOGY | Facility: CLINIC | Age: 73
End: 2018-05-30
Attending: INTERNAL MEDICINE
Payer: MEDICARE

## 2018-05-30 VITALS
WEIGHT: 181 LBS | DIASTOLIC BLOOD PRESSURE: 70 MMHG | HEIGHT: 69 IN | BODY MASS INDEX: 26.81 KG/M2 | SYSTOLIC BLOOD PRESSURE: 105 MMHG | HEART RATE: 66 BPM

## 2018-05-30 DIAGNOSIS — I25.810 CORONARY ARTERY DISEASE INVOLVING CORONARY BYPASS GRAFT OF NATIVE HEART WITHOUT ANGINA PECTORIS: ICD-10-CM

## 2018-05-30 DIAGNOSIS — I48.91 ATRIAL FIBRILLATION AND FLUTTER (H): Primary | ICD-10-CM

## 2018-05-30 DIAGNOSIS — R60.0 LOWER EXTREMITY EDEMA: ICD-10-CM

## 2018-05-30 DIAGNOSIS — I35.0 AORTIC VALVE STENOSIS, ETIOLOGY OF CARDIAC VALVE DISEASE UNSPECIFIED: ICD-10-CM

## 2018-05-30 DIAGNOSIS — I25.10 CORONARY ARTERY DISEASE INVOLVING NATIVE CORONARY ARTERY OF NATIVE HEART WITHOUT ANGINA PECTORIS: ICD-10-CM

## 2018-05-30 DIAGNOSIS — I48.92 ATRIAL FIBRILLATION AND FLUTTER (H): Primary | ICD-10-CM

## 2018-05-30 DIAGNOSIS — I48.0 PAROXYSMAL ATRIAL FIBRILLATION (H): ICD-10-CM

## 2018-05-30 LAB
CHOLEST SERPL-MCNC: 146 MG/DL
HDLC SERPL-MCNC: 61 MG/DL
LDLC SERPL CALC-MCNC: 67 MG/DL
NONHDLC SERPL-MCNC: 85 MG/DL
TRIGL SERPL-MCNC: 90 MG/DL
TSH SERPL DL<=0.005 MIU/L-ACNC: 3.66 MU/L (ref 0.4–4)

## 2018-05-30 PROCEDURE — 99214 OFFICE O/P EST MOD 30 MIN: CPT | Performed by: NURSE PRACTITIONER

## 2018-05-30 PROCEDURE — 36415 COLL VENOUS BLD VENIPUNCTURE: CPT | Performed by: INTERNAL MEDICINE

## 2018-05-30 PROCEDURE — 80061 LIPID PANEL: CPT | Performed by: INTERNAL MEDICINE

## 2018-05-30 PROCEDURE — 84443 ASSAY THYROID STIM HORMONE: CPT | Performed by: INTERNAL MEDICINE

## 2018-05-30 NOTE — MR AVS SNAPSHOT
After Visit Summary   5/30/2018    Efrem Ramos    MRN: [id number]           Patient Information     Date Of Birth          1945        Visit Information        Provider Department      5/30/2018 1:10 PM Marya Vela APRN CNP [facility]   [place]        Today's Diagnoses     Aortic valve stenosis, etiology of cardiac valve disease unspecified          Care Instructions    Thanks for coming into [facility] today.    Reviewed echo results.   HR stable off digoxin.  Labs stable  Continue on current medical therapy.   Follow up with sleep lab next week.   Follow up in 6 months with SHAWN        Please call my nurse at [phone] with any questions or concerns.    Scheduling phone number: [phone]  Reminder: Please bring in all current medications, over the counter supplements and vitamin bottles to your next appointment.                Follow-ups after your visit        Additional Services     Follow-Up with Cardiac Advanced Practice Provider                 Your next 10 appointments already scheduled     Sep 19, 2018  1:15 PM CDT   Return Visit with  Oncology Nurse   [facility] ([facility])    [facility]  [address]  [facility] [address]   [phone]            Sep 26, 2018  1:00 PM CDT   Return Visit with Shae Aldana MD   [facility] ([facility])    [facility]  [address]  [facility] [address]   [phone]              Future tests that were ordered for you today     Open Future Orders        Priority Expected Expires Ordered    Follow-Up with Cardiac Advanced Practice Provider Routine 11/26/2018 5/30/2019 5/30/2018            Who to contact     If you have questions or need follow up information about today's clinic visit or your schedule please contact [facility]   [place]  "directly at [phone].  Normal or non-critical lab and imaging results will be communicated to you by MyChart, letter or phone within 4 business days after the clinic has received the results. If you do not hear from us within 7 days, please contact the clinic through iViZ Securityhart or phone. If you have a critical or abnormal lab result, we will notify you by phone as soon as possible.  Submit refill requests through [employer] or call your pharmacy and they will forward the refill request to us. Please allow 3 business days for your refill to be completed.          Additional Information About Your Visit        [employer] Information     [employer] gives you secure access to your electronic health record. If you see a primary care provider, you can also send messages to your care team and make appointments. If you have questions, please call your primary care clinic.  If you do not have a primary care provider, please call [phone] and they will assist you.        Care EveryWhere ID     This is your Care EveryWhere ID. This could be used by other organizations to access your [place] medical records  [id number]        Your Vitals Were     Pulse Height BMI (Body Mass Index)             66 1.74 m (5' 8.5\") 27.12 kg/m2          Blood Pressure from Last 3 Encounters:   05/30/18 105/70   05/16/18 110/68   05/11/18 126/75    Weight from Last 3 Encounters:   05/30/18 82.1 kg (181 lb)   05/16/18 81.6 kg (180 lb)   05/11/18 81.6 kg (180 lb)              We Performed the Following     Follow-Up with Cardiac Advanced Practice Provider        Primary Care Provider Office Phone # Fax #    Danny Paige [phone] [address]  [place] [address]        Equal Access to Services     JAMEEL HARRIS : Tika Hodge, codi alexander, keyanna merino, alfonzo lynch. So [facility] [phone].    ATENCIÓN: Si habla español, tiene a palomo disposición " servicios gratuitos de asistencia lingüística. Shilo watt [phone].    We comply with applicable federal civil rights laws and [place] laws. We do not discriminate on the basis of race, color, national origin, age, disability, sex, sexual orientation, or gender identity.            Thank you!     Thank you for choosing [facility]  for your care. Our goal is always to provide you with excellent care. Hearing back from our patients is one way we can continue to improve our services. Please take a few minutes to complete the written survey that you may receive in the mail after your visit with us. Thank you!             Your Updated Medication List - Protect others around you: Learn how to safely use, store and throw away your medicines at www.disposemymeds.org.          This list is accurate as of 5/30/18  1:40 PM.  Always use your most recent med list.                   Brand Name Dispense Instructions for use Diagnosis    acetaminophen 500 MG tablet    TYLENOL     Take 1,000 mg by mouth every 6 hours as needed for mild pain        apixaban ANTICOAGULANT 5 MG tablet    ELIQUIS    180 tablet    Take 1 tablet (5 mg) by mouth 2 times daily    Atrial fibrillation and flutter (H)       ASPIRIN PO      Take 81 mg by mouth every morning        atorvastatin 40 MG tablet    LIPITOR    90 tablet    Take 1 tablet (40 mg) by mouth daily    Coronary artery disease involving native coronary artery of native heart with angina pectoris (H)       Cyanocobalamin 2000 MCG Tabs      Take 2,000 mcg by mouth every 7 days        gabapentin 300 MG capsule    NEURONTIN    60 capsule    Take 2 capsules (600 mg) by mouth every evening    Restless legs syndrome (RLS)       metoprolol succinate 25 MG 24 hr tablet    TOPROL-XL    45 tablet    Take 0.5 tablets (12.5 mg) by mouth daily    Nonrheumatic aortic valve stenosis       multivitamin Tabs tablet      Take 1 tablet by mouth 2 times daily

## 2018-05-30 NOTE — PATIENT INSTRUCTIONS
Thanks for coming into [facility] clinic today.    Reviewed echo results.   HR stable off digoxin.  Labs stable  Continue on current medical therapy.   Follow up with sleep lab next week.   Follow up in 6 months with SHAWN        Please call my nurse at [phone] with any questions or concerns.    Scheduling phone number: [phone]  Reminder: Please bring in all current medications, over the counter supplements and vitamin bottles to your next appointment.

## 2018-05-30 NOTE — LETTER
5/30/2018    Danny Paige MD  [address]  [facility] [address]    RE: Efrem Ramos       Dear Colleague,    I had the pleasure of seeing Efrem Ramos in the [facility].    Cardiology Clinic Progress Note  Efrem Ramos MRN# [id number]   YOB: 1945 Age: 72 year old     Reason For Visit:  Echo and Lab results.    Primary Cardiologist:   Dr. Chavis          History of Presenting Illness:    Efrem Ramos is a pleasant 72 year old patient who carries a  history significant for stroke w/ visual changes, atrial fibrillation/flutter (off amiodarone due to evidence of interstitial lung disease), aortic stenosis, acute disseminated encephalomyelitis, CAD, sleep apnea, COPD, PVD and MGUS. He recently presented to the office for follow up and found to have symptomatic bradycardia in the low 50's. His digoxin was discontinued and instructed to get a follow up echo. He returns to the office today accompanied by his wife for results.     He is feeling well on a cardiac standpoint, denies chest pain, shortness of breath, PND, orthopnea, presyncope, syncope, edema, heart racing, or palpitations.  He admits his lower extremity edema has completely resolved after significantly reducing the sodium in his diet. He continues with occasional exertional shortness of breath and fatigue.    Recent echo confirms:   Echo confirms  Left ventricular systolic function is normal.  There is severe mitral annular calcification.  There is mild (1+) mitral regurgitation.  Mild to moderate valvular aortic stenosis.    Last ischemic evaluation (9/2017) confirms:   1. Moderate nonobstructive two-vessel coronary disease  The patient's coronary angiogram demonstrated mild to moderate  LAD disease and moderate distal RCA disease. Both lesions appeared  similar to previous angiogram.     Blood pressure is well controlled at 105/70, heart rate 66.  He has discontinued his  digoxin and remains on metoprolol 12.5 mg daily and Eliquis 5 mg twice daily for stroke prevention.  He reports no significant change in symptoms since stopping digoxin.    Follow up labs today confirm normal TSH - 3.66  Lipid panel: Total cholesterol 146, triglycerides 90, HDL 61, LDL 67.    His activity consists of primarily walking with a cane.  Compliant with all medications.                   Assessment and Plan:   (I48.91,  I48.92) Atrial fibrillation and flutter (H)  (primary encounter diagnosis)  Comment: Well-controlled on metoprolol and Eliquis    (I35.0) Aortic valve stenosis, etiology of cardiac valve disease unspecified  Comment: Echo confirmed trace aortic regurgitation.  The peak AoV pressure gradient is 29.0 mmHg. The mean AoV pressure gradient is  18.3 mmHg. The calculated aortic valve are is 1.3 cm^2. Mild to moderate  valvular aortic stenosis.    (I25.10) Coronary artery disease involving native coronary artery of native heart without angina pectoris  Comment: Stable, no symptoms of ischemia.  Last angiogram confirms moderate nonobstructive 2 vessel disease  Plan: Continue on aspirin, beta-blocker, statin.  Encourage exercise, weight loss, heart healthy diet.    (R60.0) Lower extremity edema  Comment: Resolved after significant reduction in sodium intake.  Heart healthy, low-sodium diet.              Thank you for allowing me to participate in this delightful patient's care.      This note was completed in part using Dragon voice recognition software. Although reviewed after completion, some word and grammatical errors may occur.    FABI Griffith, CNP          Review of Systems:   Review of Systems:  Skin:  not assessed rash   Eyes:  Positive for glasses  ENT:  Negative postnasal drainage;hearing loss  Respiratory:  Positive for dyspnea on exertion;cough;wheezing  Cardiovascular:  Negative for;palpitations;chest pain;edema Positive for;lightheadedness;fatigue;dizziness;chest  "pain  Gastroenterology: Positive for poor appetite  Genitourinary:  Positive for urgency  Musculoskeletal:  Positive for joint pain;arthritis  Neurologic:  Positive for stroke;incoordination  Psychiatric:  Positive for sleep disturbances  Heme/Lymph/Imm:  Positive for    Endocrine:  Negative                Physical Exam:     Vitals: /70  Pulse 66  Ht 1.74 m (5' 8.5\")  Wt 82.1 kg (181 lb)  BMI 27.12 kg/m2  Constitutional:  cooperative, alert and oriented, well developed, well nourished, in no acute distress        Skin:  warm and dry to the touch, no apparent skin lesions or masses noted        Head:  normocephalic, no masses or lesions        Eyes:  pupils equal and round, conjunctivae and lids unremarkable, sclera white, no xanthalasma, EOMS intact, no nystagmus        ENT:  no pallor or cyanosis, dentition good        Neck:  carotid pulses are full and equal bilaterally, JVP normal, no carotid bruit        Chest:    fine rales bilateral upper lobes    Cardiac:   irregular rhythm     systolic murmur          Abdomen:  BS normoactive;non-tender        Vascular:                                    diminished pedal pulses bilaterally.    Extremities and Back:  no deformities, clubbing, cyanosis, erythema observed;no edema        Neurological:    ataxia;limb weakness               Medications:     Current Outpatient Prescriptions   Medication Sig Dispense Refill     acetaminophen (TYLENOL) 500 MG tablet Take 1,000 mg by mouth every 6 hours as needed for mild pain       apixaban ANTICOAGULANT (ELIQUIS) 5 MG tablet Take 1 tablet (5 mg) by mouth 2 times daily 180 tablet 2     ASPIRIN PO Take 81 mg by mouth every morning        atorvastatin (LIPITOR) 40 MG tablet Take 1 tablet (40 mg) by mouth daily 90 tablet 3     Cyanocobalamin 2000 MCG TABS Take 2,000 mcg by mouth every 7 days       gabapentin (NEURONTIN) 300 MG capsule Take 2 capsules (600 mg) by mouth every evening 60 capsule 5     metoprolol succinate " (TOPROL-XL) 25 MG 24 hr tablet Take 0.5 tablets (12.5 mg) by mouth daily 45 tablet 2     multivitamin (OCUVITE) TABS Take 1 tablet by mouth 2 times daily              Family History   Problem Relation Age of Onset     Blood Disease Mother      Anemia     Cardiovascular Father      Cancer - colorectal Maternal Grandfather      Hypertension Brother      DIABETES Sister 5     Juvinile Diabetes passed at 36     Respiratory Other      Lung Cancer       Social History     Social History     Marital status:      Spouse name: N/A     Number of children: N/A     Years of education: N/A     Occupational History     Not on file.     Social History Main Topics     Smoking status: Former Smoker     Packs/day: 1.00     Years: 30.00     Types: Cigarettes     Quit date: 7/26/2013     Smokeless tobacco: Never Used     Alcohol use 25.2 oz/week     42 Standard drinks or equivalent per week      Comment: 2 beers per day     Drug use: No     Sexual activity: Not Currently     Other Topics Concern     Caffeine Concern Yes     1 Coke occasionally      Special Diet No     Exercise No     Social History Narrative            Past Medical History:     Past Medical History:   Diagnosis Date     Atrial fibrillation (H)      Cancer (H) vocal cord     Carpal tunnel syndrome     abstracted 7/3/02.     CVA (cerebral infarction) 5/5/2015     Demyelinating disease of central nervous system, unspecified     abstracted 7/3/02.     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     Pneumonia 8/17/2016     Redundant colon     needs CT colonography     S/P coronary angiogram 09/05/2017     Shingles      SKIN DISORDERS NEC 3/15/2005     Sleep apnea               Past Surgical History:     Past Surgical History:   Procedure Laterality Date     BIOPSY  brain 2002     BONE MARROW  BIOPSY, BONE SPECIMEN, NEEDLE/TROCAR N/A 6/8/2017    Procedure: BIOPSY BONE MARROW;  UNILATERAL BONE MARROW BIOPSY (CONSCIOUS SEDATION) ;  Surgeon: Jamie Gonzales MD;  Location:  GI     C NONSPECIFIC PROCEDURE  as a child    T & A. abstracted 7/3/02.     C NONSPECIFIC PROCEDURE  early    CTR. abstracted 7/3/02.     HEAD & NECK SURGERY       ORTHOPEDIC SURGERY      right arm ulna reset after injury     THORACOSCOPIC WEDGE RESECTION LUNG Right 8/2/2016    Procedure: THORACOSCOPIC WEDGE RESECTION LUNG;  Surgeon: Abdelrahman Noriega MD;  Location:  OR              Allergies:   Adhesive tape; Amiodarone; Lasix [furosemide]; Penicillins; and Sulfa drugs       Data:   All laboratory data reviewed:    LAST CHOLESTEROL:  Lab Results   Component Value Date    CHOL 146 05/30/2018     Lab Results   Component Value Date    HDL 61 05/30/2018     Lab Results   Component Value Date    LDL 67 05/30/2018     Lab Results   Component Value Date    TRIG 90 05/30/2018     Lab Results   Component Value Date    CHOLHDLRATIO 2.7 09/04/2015       LAST BMP:  Lab Results   Component Value Date     03/21/2018      Lab Results   Component Value Date    POTASSIUM 3.9 03/21/2018     Lab Results   Component Value Date    CHLORIDE 108 03/21/2018     Lab Results   Component Value Date    ULISSES 8.3 03/21/2018     Lab Results   Component Value Date    CO2 28 03/21/2018     Lab Results   Component Value Date    BUN 8 03/21/2018     Lab Results   Component Value Date    CR 0.82 03/21/2018     Lab Results   Component Value Date    GLC 84 03/21/2018       LAST CBC:  Lab Results   Component Value Date    WBC 11.6 03/21/2018     Lab Results   Component Value Date    RBC 4.07 03/21/2018     Lab Results   Component Value Date    HGB 13.0 03/21/2018     Lab Results   Component Value Date    HCT 39.6 03/21/2018     Lab Results   Component Value Date    MCV 97 03/21/2018     Lab Results   Component Value Date    MCH 31.9 03/21/2018     Lab  Results   Component Value Date    MCHC 32.8 03/21/2018     Lab Results   Component Value Date    RDW 14.2 03/21/2018     Lab Results   Component Value Date     03/21/2018         Thank you for allowing me to participate in the care of your patient.    Sincerely,     FABI Griffith [facility]

## 2018-05-30 NOTE — PROGRESS NOTES
Cardiology Clinic Progress Note  Efrem Ramos MRN# [id number]   YOB: 1945 Age: 72 year old     Reason For Visit:  Echo and Lab results.    Primary Cardiologist:   Dr. Chavis          History of Presenting Illness:    Efrem Ramos is a pleasant 72 year old patient who carries a  history significant for stroke w/ visual changes, atrial fibrillation/flutter (off amiodarone due to evidence of interstitial lung disease), aortic stenosis, acute disseminated encephalomyelitis, CAD, sleep apnea, COPD, PVD and MGUS. He recently presented to the office for follow up and found to have symptomatic bradycardia in the low 50's. His digoxin was discontinued and instructed to get a follow up echo. He returns to the office today accompanied by his wife for results.     He is feeling well on a cardiac standpoint, denies chest pain, shortness of breath, PND, orthopnea, presyncope, syncope, edema, heart racing, or palpitations.  He admits his lower extremity edema has completely resolved after significantly reducing the sodium in his diet. He continues with occasional exertional shortness of breath and fatigue.    Recent echo confirms:   Echo confirms  Left ventricular systolic function is normal.  There is severe mitral annular calcification.  There is mild (1+) mitral regurgitation.  Mild to moderate valvular aortic stenosis.    Last ischemic evaluation (9/2017) confirms:   1. Moderate nonobstructive two-vessel coronary disease  The patient's coronary angiogram demonstrated mild to moderate  LAD disease and moderate distal RCA disease. Both lesions appeared  similar to previous angiogram.     Blood pressure is well controlled at 105/70, heart rate 66.  He has discontinued his digoxin and remains on metoprolol 12.5 mg daily and Eliquis 5 mg twice daily for stroke prevention.  He reports no significant change in symptoms since stopping digoxin.    Follow up labs today confirm normal TSH - 3.66  Lipid panel:  Total cholesterol 146, triglycerides 90, HDL 61, LDL 67.    His activity consists of primarily walking with a cane.  Compliant with all medications.                   Assessment and Plan:   (I48.91,  I48.92) Atrial fibrillation and flutter (H)  (primary encounter diagnosis)  Comment: Well-controlled on metoprolol and Eliquis    (I35.0) Aortic valve stenosis, etiology of cardiac valve disease unspecified  Comment: Echo confirmed trace aortic regurgitation.  The peak AoV pressure gradient is 29.0 mmHg. The mean AoV pressure gradient is  18.3 mmHg. The calculated aortic valve are is 1.3 cm^2. Mild to moderate  valvular aortic stenosis.    (I25.10) Coronary artery disease involving native coronary artery of native heart without angina pectoris  Comment: Stable, no symptoms of ischemia.  Last angiogram confirms moderate nonobstructive 2 vessel disease  Plan: Continue on aspirin, beta-blocker, statin.  Encourage exercise, weight loss, heart healthy diet.    (R60.0) Lower extremity edema  Comment: Resolved after significant reduction in sodium intake.  Heart healthy, low-sodium diet.              Thank you for allowing me to participate in this delightful patient's care.      This note was completed in part using Dragon voice recognition software. Although reviewed after completion, some word and grammatical errors may occur.    FABI Griffith, CNP          Review of Systems:   Review of Systems:  Skin:  not assessed rash   Eyes:  Positive for glasses  ENT:  Negative postnasal drainage;hearing loss  Respiratory:  Positive for dyspnea on exertion;cough;wheezing  Cardiovascular:  Negative for;palpitations;chest pain;edema Positive for;lightheadedness;fatigue;dizziness;chest pain  Gastroenterology: Positive for poor appetite  Genitourinary:  Positive for urgency  Musculoskeletal:  Positive for joint pain;arthritis  Neurologic:  Positive for stroke;incoordination  Psychiatric:  Positive for sleep  "disturbances  Heme/Lymph/Imm:  Positive for    Endocrine:  Negative                Physical Exam:     Vitals: /70  Pulse 66  Ht 1.74 m (5' 8.5\")  Wt 82.1 kg (181 lb)  BMI 27.12 kg/m2  Constitutional:  cooperative, alert and oriented, well developed, well nourished, in no acute distress        Skin:  warm and dry to the touch, no apparent skin lesions or masses noted        Head:  normocephalic, no masses or lesions        Eyes:  pupils equal and round, conjunctivae and lids unremarkable, sclera white, no xanthalasma, EOMS intact, no nystagmus        ENT:  no pallor or cyanosis, dentition good        Neck:  carotid pulses are full and equal bilaterally, JVP normal, no carotid bruit        Chest:    fine rales bilateral upper lobes    Cardiac:   irregular rhythm     systolic murmur          Abdomen:  BS normoactive;non-tender        Vascular:                                    diminished pedal pulses bilaterally.    Extremities and Back:  no deformities, clubbing, cyanosis, erythema observed;no edema        Neurological:    ataxia;limb weakness               Medications:     Current Outpatient Prescriptions   Medication Sig Dispense Refill     acetaminophen (TYLENOL) 500 MG tablet Take 1,000 mg by mouth every 6 hours as needed for mild pain       apixaban ANTICOAGULANT (ELIQUIS) 5 MG tablet Take 1 tablet (5 mg) by mouth 2 times daily 180 tablet 2     ASPIRIN PO Take 81 mg by mouth every morning        atorvastatin (LIPITOR) 40 MG tablet Take 1 tablet (40 mg) by mouth daily 90 tablet 3     Cyanocobalamin 2000 MCG TABS Take 2,000 mcg by mouth every 7 days       gabapentin (NEURONTIN) 300 MG capsule Take 2 capsules (600 mg) by mouth every evening 60 capsule 5     metoprolol succinate (TOPROL-XL) 25 MG 24 hr tablet Take 0.5 tablets (12.5 mg) by mouth daily 45 tablet 2     multivitamin (OCUVITE) TABS Take 1 tablet by mouth 2 times daily              Family History   Problem Relation Age of Onset     Blood Disease " Mother      Anemia     Cardiovascular Father      Cancer - colorectal Maternal Grandfather      Hypertension Brother      DIABETES Sister 5     Juvinile Diabetes passed at 36     Respiratory Other      Lung Cancer       Social History     Social History     Marital status:      Spouse name: N/A     Number of children: N/A     Years of education: N/A     Occupational History     Not on file.     Social History Main Topics     Smoking status: Former Smoker     Packs/day: 1.00     Years: 30.00     Types: Cigarettes     Quit date: 7/26/2013     Smokeless tobacco: Never Used     Alcohol use 25.2 oz/week     42 Standard drinks or equivalent per week      Comment: 2 beers per day     Drug use: No     Sexual activity: Not Currently     Other Topics Concern     Caffeine Concern Yes     1 Coke occasionally      Special Diet No     Exercise No     Social History Narrative            Past Medical History:     Past Medical History:   Diagnosis Date     Atrial fibrillation (H)      Cancer (H) vocal cord     Carpal tunnel syndrome     abstracted 7/3/02.     CVA (cerebral infarction) 5/5/2015     Demyelinating disease of central nervous system, unspecified     abstracted 7/3/02.     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     Pneumonia 8/17/2016     Redundant colon     needs CT colonography     S/P coronary angiogram 09/05/2017     Shingles      SKIN DISORDERS NEC 3/15/2005     Sleep apnea               Past Surgical History:     Past Surgical History:   Procedure Laterality Date     BIOPSY  brain 2002     BONE MARROW BIOPSY, BONE SPECIMEN, NEEDLE/TROCAR N/A 6/8/2017    Procedure: BIOPSY BONE MARROW;  UNILATERAL BONE MARROW BIOPSY (CONSCIOUS SEDATION) ;  Surgeon: Jamie Gonzales MD;  Location:  GI     C NONSPECIFIC PROCEDURE  as a child    T &  A. abstracted 7/3/02.     C NONSPECIFIC PROCEDURE  early    CTR. abstracted 7/3/02.     HEAD & NECK SURGERY       ORTHOPEDIC SURGERY      right arm ulna reset after injury     THORACOSCOPIC WEDGE RESECTION LUNG Right 8/2/2016    Procedure: THORACOSCOPIC WEDGE RESECTION LUNG;  Surgeon: Abdelrahman Noriega MD;  Location:  OR              Allergies:   Adhesive tape; Amiodarone; Lasix [furosemide]; Penicillins; and Sulfa drugs       Data:   All laboratory data reviewed:    LAST CHOLESTEROL:  Lab Results   Component Value Date    CHOL 146 05/30/2018     Lab Results   Component Value Date    HDL 61 05/30/2018     Lab Results   Component Value Date    LDL 67 05/30/2018     Lab Results   Component Value Date    TRIG 90 05/30/2018     Lab Results   Component Value Date    CHOLHDLRATIO 2.7 09/04/2015       LAST BMP:  Lab Results   Component Value Date     03/21/2018      Lab Results   Component Value Date    POTASSIUM 3.9 03/21/2018     Lab Results   Component Value Date    CHLORIDE 108 03/21/2018     Lab Results   Component Value Date    ULISSES 8.3 03/21/2018     Lab Results   Component Value Date    CO2 28 03/21/2018     Lab Results   Component Value Date    BUN 8 03/21/2018     Lab Results   Component Value Date    CR 0.82 03/21/2018     Lab Results   Component Value Date    GLC 84 03/21/2018       LAST CBC:  Lab Results   Component Value Date    WBC 11.6 03/21/2018     Lab Results   Component Value Date    RBC 4.07 03/21/2018     Lab Results   Component Value Date    HGB 13.0 03/21/2018     Lab Results   Component Value Date    HCT 39.6 03/21/2018     Lab Results   Component Value Date    MCV 97 03/21/2018     Lab Results   Component Value Date    MCH 31.9 03/21/2018     Lab Results   Component Value Date    MCHC 32.8 03/21/2018     Lab Results   Component Value Date    RDW 14.2 03/21/2018     Lab Results   Component Value Date     03/21/2018

## 2018-06-06 ENCOUNTER — DOCUMENTATION ONLY (OUTPATIENT)
Dept: SLEEP MEDICINE | Facility: CLINIC | Age: 73
End: 2018-06-06

## 2018-06-06 NOTE — PROGRESS NOTES
Patient came to [facility] mask fitting appointment on June 5, 2018. Patient requested to switch masks because general discomfort .  Patient tried on the following masks: AirFit F20 LG and Dreamwear Full Face Med.   Patient selected  [employer], type DreamwearFull Face mask Medium. Patient was also given an overview of how to properly operate is SystemOne CPAP machined as he had not used it in a few years due to numerous health issues.

## 2018-08-15 ENCOUNTER — OFFICE VISIT (OUTPATIENT)
Dept: SLEEP MEDICINE | Facility: CLINIC | Age: 73
End: 2018-08-15
Payer: MEDICARE

## 2018-08-15 VITALS
RESPIRATION RATE: 16 BRPM | BODY MASS INDEX: 27.93 KG/M2 | HEIGHT: 69 IN | HEART RATE: 68 BPM | WEIGHT: 188.6 LBS | OXYGEN SATURATION: 96 % | SYSTOLIC BLOOD PRESSURE: 110 MMHG | DIASTOLIC BLOOD PRESSURE: 73 MMHG

## 2018-08-15 DIAGNOSIS — G47.33 OSA (OBSTRUCTIVE SLEEP APNEA): Primary | ICD-10-CM

## 2018-08-15 PROCEDURE — 99213 OFFICE O/P EST LOW 20 MIN: CPT | Performed by: INTERNAL MEDICINE

## 2018-08-15 NOTE — PROGRESS NOTES
Obstructive Sleep Apnea - PAP Follow-Up Visit:    Chief Complaint   Patient presents with     Sleep Apnea     CPAP Follow Up       Efrem Ramos comes in today for follow-up of their severe sleep apnea, managed with CPAP.     PSG from 7/20/2015 showed an AHI of 43.4.     Overall, he rates the experience with PAP as 8 (0 poor, 10 great). The mask is comfortable. The mask is not leaking.  He is not snoring with the mask on. He is not having gasp arousals.  He is not having significant oral/nasal dryness. The pressure settings are comfortable.     He uses full-face mask.     Bedtime is typically 12 am. Usually it takes about 10 minutes to fall asleep with the mask on. Wake time is typically 5-6 am.  Patient is using PAP therapy 5-6 hours per night. The patient is usually getting 6-8 hours of sleep per night.    He does feel rested in the morning.    Total score - [place]: 5 (5/16/2018  1:24 PM)      Respironics    Auto-PAP 10-14 cmH2O download:  29/30 total days of use. 1 nonuse days.  Average use 5 hours 39 minutes per day.  83% days with >4 hours use.  Large leak 0 secs per day average. CPAP 90% pressure 1.2cm. AHI 1.7          Reviewed by team:      Reviewed by provider:        Problem List:  Patient Active Problem List    Diagnosis Date Noted     CAD (coronary artery disease)-moderate  nonobstructive 2 vessel 09/18/2017     Priority: Medium     Pneumonia 08/17/2016     Priority: Medium     Acute respiratory failure with hypoxia (H) 08/10/2016     Priority: Medium     Physical deconditioning 08/10/2016     Priority: Medium     Urinary retention 08/10/2016     Priority: Medium     Community acquired pneumonia 08/10/2016     Priority: Medium     Constipation 08/10/2016     Priority: Medium     Hypoxia 07/27/2016     Priority: Medium     Atrial fibrillation (H) 06/28/2016     Priority: Medium     Health Care Home 12/10/2015     Priority: Medium     State Tier Level:  unknown  Status:  Unable to reach  Care  "Coordinator:  Denisse Gonzales    See Letters for [facility] Care Plan  Date:  December 10, 2015           Atrial fibrillation and flutter (H) 12/07/2015     Priority: Medium     Alcohol abuse 08/11/2015     Priority: Medium     Cerebral infarction (H) 06/09/2015     Priority: Medium     Diagnosis updated by automated process. Provider to review and confirm.       Rash and nonspecific skin eruption 12/06/2013     Priority: Medium     Vitamin B12 deficiency disease 09/10/2013     Priority: Medium     Elevated ferritin 09/10/2013     Priority: Medium     Collagenous colitis 09/10/2013     Priority: Medium     Redundant colon      Priority: Medium     Lymphocytic colitis 08/18/2013     Priority: Medium     Mitral valve problem 08/18/2013     Priority: Medium     TRANSTHORACIC ECHOCARDIOGRAM 08/2013 There is a linear strand like projection seen in the LV cavity in diastole that I suspect is the posterior mitral leaflet although I cannot exclude a torn chordae or small vegetation         IgG monoclonal gammopathy 08/18/2013     Priority: Medium     MGUS        CARDIOVASCULAR SCREENING; LDL GOAL LESS THAN 100 08/18/2013     Priority: Medium     Vocal cord cancer (H) 07/31/2013     Priority: Medium     07/2013       Lower extremity edema 07/31/2013     Priority: Medium     Anemia 07/23/2013     Priority: Medium     MEL (obstructive sleep apnea) 07/22/2013     Priority: Medium     PVD (peripheral vascular disease) (H) 07/22/2013     Priority: Medium     Decreased diffusion capacity 07/22/2013     Priority: Medium     Encephalopathy 03/15/2005     Priority: Medium     Problem list name updated by automated process. Provider to review       Mixed hyperlipidemia 03/15/2005     Priority: Medium     Other specified disorder of skin 03/15/2005     Priority: Medium          /73  Pulse 68  Resp 16  Ht 1.74 m (5' 8.5\")  Wt 85.5 kg (188 lb 9.6 oz)  SpO2 96%  BMI 28.26 kg/m2    Impression/Plan:     Severe sleep apnea.     - " Tolerating PAP well. Daytime symptoms are stable. Regular compliance and normal AHI noted on download.     Plan:     1. Continue auto PAP therapy       Efrem Ramos will follow up in about 1 year(s).     Fifteen minutes spent with patient, all of which were spent face-to-face counseling, consulting, coordinating plan of care.      Ron Pacheco MD, MD    CC:  Danny Paige,

## 2018-08-15 NOTE — MR AVS SNAPSHOT
After Visit Summary   8/15/2018    Efrem Ramos    MRN: [id number]           Patient Information     Date Of Birth          1945        Visit Information        Provider Department      8/15/2018 10:30 AM Rno Pacheco MD [facility]        Today's Diagnoses     MEL (obstructive sleep apnea)    -  1      Care Instructions        Your Body mass index is 28.26 kg/(m^2).  Weight management is a personal decision.  If you are interested in exploring weight loss strategies, the following discussion covers the approaches that may be successful. Body mass index (BMI) is one way to tell whether you are at a healthy weight, overweight, or obese. It measures your weight in relation to your height.  A BMI of 18.5 to 24.9 is in the healthy range. A person with a BMI of 25 to 29.9 is considered overweight, and someone with a BMI of 30 or greater is considered obese. More than two-thirds of American adults are considered overweight or obese.  Being overweight or obese increases the risk for further weight gain. Excess weight may lead to heart disease and diabetes.  Creating and following plans for healthy eating and physical activity may help you improve your health.  Weight control is part of healthy lifestyle and includes exercise, emotional health, and healthy eating habits. Careful eating habits lifelong are the mainstay of weight control. Though there are significant health benefits from weight loss, long-term weight loss with diet alone may be very difficult to achieve- studies show long-term success with dietary management in less than 10% of people. Attaining a healthy weight may be especially difficult to achieve in those with severe obesity. In some cases, medications, devices and surgical management might be considered.  What can you do?  If you are overweight or obese and are interested in methods for weight loss, you should discuss this with your provider.     Consider reducing  daily calorie intake by 500 calories.     Keep a food journal.     Avoiding skipping meals, consider cutting portions instead.    Diet combined with exercise helps maintain muscle while optimizing fat loss. Strength training is particularly important for building and maintaining muscle mass. Exercise helps reduce stress, increase energy, and improves fitness. Increasing exercise without diet control, however, may not burn enough calories to loose weight.       Start walking three days a week 10-20 minutes at a time    Work towards walking thirty minutes five days a week     Eventually, increase the speed of your walking for 1-2 minutes at time    In addition, we recommend that you review healthy lifestyles and methods for weight loss available through the National Institutes of Health patient information sites:  http://win.niddk.nih.gov/publications/index.htm    And look into health and wellness programs that may be available through your health insurance provider, employer, local community center, or anna club.                Follow-ups after your visit        Your next 10 appointments already scheduled     Sep 19, 2018  1:15 PM CDT   Return Visit with  Oncology Nurse   [facility] ([facility])    [facility]  [address]  [facility] [address]   [phone]            Sep 26, 2018  1:00 PM CDT   Return Visit with Shae Aldana MD   [facility] ([facility])    [facility]  [address] [facility] [facility] [address]  [facility] [address]   [phone]            Aug 14, 2019  1:30 PM CDT   Return Sleep Patient with Ron Pacheco MD   [facility] ([facility])    [address]  [place] [facility] [facility] [address]   [phone]              Who to contact     If you have questions or need follow up information about today's clinic visit or your schedule please contact [facility]  "[facility] [place] directly at [phone].  Normal or non-critical lab and imaging results will be communicated to you by MyChart, letter or phone within 4 business days after the clinic has received the results. If you do not hear from us within 7 days, please contact the clinic through [employer] or phone. If you have a critical or abnormal lab result, we will notify you by phone as soon as possible.  Submit refill requests through [employer] or call your pharmacy and they will forward the refill request to us. Please allow 3 business days for your refill to be completed.          Additional Information About Your Visit        [employer] Information     [employer] gives you secure access to your electronic health record. If you see a primary care provider, you can also send messages to your care team and make appointments. If you have questions, please call your primary care clinic.  If you do not have a primary care provider, please call [phone] and they will assist you.        Care EveryWhere ID     This is your Care EveryWhere ID. This could be used by other organizations to access your [facility] medical records  [id number]        Your Vitals Were     Pulse Respirations Height Pulse Oximetry BMI (Body Mass Index)       68 16 1.74 m (5' 8.5\") 96% 28.26 kg/m2        Blood Pressure from Last 3 Encounters:   08/15/18 110/73   05/30/18 105/70   05/16/18 110/68    Weight from Last 3 Encounters:   08/15/18 85.5 kg (188 lb 9.6 oz)   05/30/18 82.1 kg (181 lb)   05/16/18 81.6 kg (180 lb)              Today, you had the following     No orders found for display       Primary Care Provider Office Phone # Fax #    Danny Paige [phone] [address]  [place] [address]        Equal Access to Services     JAMEEL HARRIS : Hadii angle herringo Sochetan, waaxda luqadaha, qaybta kaalmada yrisyada, alfonzo lynch. So [facility] [phone].    ATENCIÓN: Si caity espkarlos, " tiene a palomo disposición servicios gratuitos de asistencia lingüística. Shilo watt [phone].    We comply with applicable federal civil rights laws and [place] laws. We do not discriminate on the basis of race, color, national origin, age, disability, sex, sexual orientation, or gender identity.            Thank you!     Thank you for choosing [facility]  for your care. Our goal is always to provide you with excellent care. Hearing back from our patients is one way we can continue to improve our services. Please take a few minutes to complete the written survey that you may receive in the mail after your visit with us. Thank you!             Your Updated Medication List - Protect others around you: Learn how to safely use, store and throw away your medicines at www.disposemymeds.org.          This list is accurate as of 8/15/18 11:00 AM.  Always use your most recent med list.                   Brand Name Dispense Instructions for use Diagnosis    acetaminophen 500 MG tablet    TYLENOL     Take 1,000 mg by mouth every 6 hours as needed for mild pain        apixaban ANTICOAGULANT 5 MG tablet    ELIQUIS    180 tablet    Take 1 tablet (5 mg) by mouth 2 times daily    Atrial fibrillation and flutter (H)       ASPIRIN PO      Take 81 mg by mouth every morning        atorvastatin 40 MG tablet    LIPITOR    90 tablet    Take 1 tablet (40 mg) by mouth daily    Coronary artery disease involving native coronary artery of native heart with angina pectoris (H)       Cyanocobalamin 2000 MCG Tabs      Take 2,000 mcg by mouth every 7 days        gabapentin 300 MG capsule    NEURONTIN    60 capsule    Take 2 capsules (600 mg) by mouth every evening    Restless legs syndrome (RLS)       metoprolol succinate 25 MG 24 hr tablet    TOPROL-XL    45 tablet    Take 0.5 tablets (12.5 mg) by mouth daily    Nonrheumatic aortic valve stenosis       multivitamin Tabs tablet      Take 1 tablet by mouth 2 times daily

## 2018-08-15 NOTE — NURSING NOTE
"Chief Complaint   Patient presents with     Sleep Apnea     CPAP Follow Up       Initial /73  Pulse 68  Resp 16  Ht 1.74 m (5' 8.5\")  Wt 85.5 kg (188 lb 9.6 oz)  SpO2 96%  BMI 28.26 kg/m2 Estimated body mass index is 28.26 kg/(m^2) as calculated from the following:    Height as of this encounter: 1.74 m (5' 8.5\").    Weight as of this encounter: 85.5 kg (188 lb 9.6 oz).    Medication Reconciliation: complete     ESS   Kendal Smiley      "

## 2018-08-15 NOTE — PATIENT INSTRUCTIONS
Your Body mass index is 28.26 kg/(m^2).  Weight management is a personal decision.  If you are interested in exploring weight loss strategies, the following discussion covers the approaches that may be successful. Body mass index (BMI) is one way to tell whether you are at a healthy weight, overweight, or obese. It measures your weight in relation to your height.  A BMI of 18.5 to 24.9 is in the healthy range. A person with a BMI of 25 to 29.9 is considered overweight, and someone with a BMI of 30 or greater is considered obese. More than two-thirds of American adults are considered overweight or obese.  Being overweight or obese increases the risk for further weight gain. Excess weight may lead to heart disease and diabetes.  Creating and following plans for healthy eating and physical activity may help you improve your health.  Weight control is part of healthy lifestyle and includes exercise, emotional health, and healthy eating habits. Careful eating habits lifelong are the mainstay of weight control. Though there are significant health benefits from weight loss, long-term weight loss with diet alone may be very difficult to achieve- studies show long-term success with dietary management in less than 10% of people. Attaining a healthy weight may be especially difficult to achieve in those with severe obesity. In some cases, medications, devices and surgical management might be considered.  What can you do?  If you are overweight or obese and are interested in methods for weight loss, you should discuss this with your provider.     Consider reducing daily calorie intake by 500 calories.     Keep a food journal.     Avoiding skipping meals, consider cutting portions instead.    Diet combined with exercise helps maintain muscle while optimizing fat loss. Strength training is particularly important for building and maintaining muscle mass. Exercise helps reduce stress, increase energy, and improves fitness.  Increasing exercise without diet control, however, may not burn enough calories to loose weight.       Start walking three days a week 10-20 minutes at a time    Work towards walking thirty minutes five days a week     Eventually, increase the speed of your walking for 1-2 minutes at time    In addition, we recommend that you review healthy lifestyles and methods for weight loss available through the National Institutes of Health patient information sites:  http://win.niddk.nih.gov/publications/index.htm    And look into health and wellness programs that may be available through your health insurance provider, employer, local community center, or anna club.

## 2018-09-04 DIAGNOSIS — G25.81 RESTLESS LEGS SYNDROME (RLS): ICD-10-CM

## 2018-09-04 RX ORDER — GABAPENTIN 300 MG/1
600 CAPSULE ORAL EVERY EVENING
Qty: 60 CAPSULE | Refills: 5 | Status: SHIPPED | OUTPATIENT
Start: 2018-09-04 | End: 2019-04-08

## 2018-09-05 ENCOUNTER — OFFICE VISIT (OUTPATIENT)
Dept: URGENT CARE | Facility: URGENT CARE | Age: 73
End: 2018-09-05
Payer: MEDICARE

## 2018-09-05 ENCOUNTER — HOSPITAL ENCOUNTER (INPATIENT)
Facility: CLINIC | Age: 73
LOS: 5 days | Discharge: NURSING, REHAB OR LONG-TERM CARE | DRG: 392 | End: 2018-09-10
Attending: EMERGENCY MEDICINE | Admitting: HOSPITALIST
Payer: MEDICARE

## 2018-09-05 ENCOUNTER — APPOINTMENT (OUTPATIENT)
Dept: CT IMAGING | Facility: CLINIC | Age: 73
DRG: 392 | End: 2018-09-05
Attending: EMERGENCY MEDICINE
Payer: MEDICARE

## 2018-09-05 ENCOUNTER — APPOINTMENT (OUTPATIENT)
Dept: GENERAL RADIOLOGY | Facility: CLINIC | Age: 73
DRG: 392 | End: 2018-09-05
Attending: EMERGENCY MEDICINE
Payer: MEDICARE

## 2018-09-05 ENCOUNTER — APPOINTMENT (OUTPATIENT)
Dept: ULTRASOUND IMAGING | Facility: CLINIC | Age: 73
DRG: 392 | End: 2018-09-05
Attending: EMERGENCY MEDICINE
Payer: MEDICARE

## 2018-09-05 VITALS
OXYGEN SATURATION: 97 % | HEART RATE: 65 BPM | TEMPERATURE: 97.8 F | DIASTOLIC BLOOD PRESSURE: 61 MMHG | WEIGHT: 172 LBS | BODY MASS INDEX: 25.77 KG/M2 | SYSTOLIC BLOOD PRESSURE: 92 MMHG

## 2018-09-05 DIAGNOSIS — N17.9 ACUTE RENAL FAILURE, UNSPECIFIED ACUTE RENAL FAILURE TYPE (H): ICD-10-CM

## 2018-09-05 DIAGNOSIS — E87.6 HYPOKALEMIA: ICD-10-CM

## 2018-09-05 DIAGNOSIS — R14.2 FLATULENCE, ERUCTATION AND GAS PAIN: ICD-10-CM

## 2018-09-05 DIAGNOSIS — R53.1 WEAKNESS GENERALIZED: Primary | ICD-10-CM

## 2018-09-05 DIAGNOSIS — R19.7 DIARRHEA, UNSPECIFIED TYPE: ICD-10-CM

## 2018-09-05 DIAGNOSIS — R14.1 FLATULENCE, ERUCTATION AND GAS PAIN: ICD-10-CM

## 2018-09-05 DIAGNOSIS — K52.831 COLLAGENOUS COLITIS: Primary | ICD-10-CM

## 2018-09-05 DIAGNOSIS — D72.829 LEUKOCYTOSIS, UNSPECIFIED TYPE: ICD-10-CM

## 2018-09-05 DIAGNOSIS — E86.0 DEHYDRATION: ICD-10-CM

## 2018-09-05 DIAGNOSIS — R14.3 FLATULENCE, ERUCTATION AND GAS PAIN: ICD-10-CM

## 2018-09-05 LAB
ALBUMIN SERPL-MCNC: 3.2 G/DL (ref 3.4–5)
ALBUMIN UR-MCNC: 10 MG/DL
ALP SERPL-CCNC: 313 U/L (ref 40–150)
ALT SERPL W P-5'-P-CCNC: 97 U/L (ref 0–70)
ANION GAP SERPL CALCULATED.3IONS-SCNC: 11 MMOL/L (ref 3–14)
APPEARANCE UR: CLEAR
AST SERPL W P-5'-P-CCNC: 118 U/L (ref 0–45)
BASOPHILS # BLD AUTO: 0.1 10E9/L (ref 0–0.2)
BASOPHILS NFR BLD AUTO: 0.5 %
BILIRUB SERPL-MCNC: 1.2 MG/DL (ref 0.2–1.3)
BILIRUB UR QL STRIP: NEGATIVE
BUN SERPL-MCNC: 38 MG/DL (ref 7–30)
CALCIUM SERPL-MCNC: 8.5 MG/DL (ref 8.5–10.1)
CHLORIDE SERPL-SCNC: 104 MMOL/L (ref 94–109)
CO2 SERPL-SCNC: 23 MMOL/L (ref 20–32)
COLOR UR AUTO: YELLOW
CREAT SERPL-MCNC: 1.66 MG/DL (ref 0.66–1.25)
CRP SERPL-MCNC: 36.9 MG/L (ref 0–8)
DIFFERENTIAL METHOD BLD: ABNORMAL
EOSINOPHIL # BLD AUTO: 0.2 10E9/L (ref 0–0.7)
EOSINOPHIL NFR BLD AUTO: 0.8 %
ERYTHROCYTE [DISTWIDTH] IN BLOOD BY AUTOMATED COUNT: 14 % (ref 10–15)
GFR SERPL CREATININE-BSD FRML MDRD: 41 ML/MIN/1.7M2
GLUCOSE SERPL-MCNC: 93 MG/DL (ref 70–99)
GLUCOSE UR STRIP-MCNC: NEGATIVE MG/DL
HCT VFR BLD AUTO: 40.4 % (ref 40–53)
HGB BLD-MCNC: 14.4 G/DL (ref 13.3–17.7)
HGB UR QL STRIP: NEGATIVE
HYALINE CASTS #/AREA URNS LPF: 17 /LPF (ref 0–2)
IMM GRANULOCYTES # BLD: 0.2 10E9/L (ref 0–0.4)
IMM GRANULOCYTES NFR BLD: 1 %
INTERPRETATION ECG - MUSE: NORMAL
KETONES UR STRIP-MCNC: NEGATIVE MG/DL
LACTATE BLD-SCNC: 1.5 MMOL/L (ref 0.7–2)
LEUKOCYTE ESTERASE UR QL STRIP: NEGATIVE
LIPASE SERPL-CCNC: 294 U/L (ref 73–393)
LYMPHOCYTES # BLD AUTO: 2.3 10E9/L (ref 0.8–5.3)
LYMPHOCYTES NFR BLD AUTO: 11.4 %
MAGNESIUM SERPL-MCNC: 2.1 MG/DL (ref 1.6–2.3)
MCH RBC QN AUTO: 32.9 PG (ref 26.5–33)
MCHC RBC AUTO-ENTMCNC: 35.6 G/DL (ref 31.5–36.5)
MCV RBC AUTO: 92 FL (ref 78–100)
MONOCYTES # BLD AUTO: 1.8 10E9/L (ref 0–1.3)
MONOCYTES NFR BLD AUTO: 9.2 %
NEUTROPHILS # BLD AUTO: 15.4 10E9/L (ref 1.6–8.3)
NEUTROPHILS NFR BLD AUTO: 77.1 %
NITRATE UR QL: NEGATIVE
NRBC # BLD AUTO: 0 10*3/UL
NRBC BLD AUTO-RTO: 0 /100
PH UR STRIP: 6 PH (ref 5–7)
PLATELET # BLD AUTO: 366 10E9/L (ref 150–450)
POTASSIUM SERPL-SCNC: 2.3 MMOL/L (ref 3.4–5.3)
POTASSIUM SERPL-SCNC: 2.8 MMOL/L (ref 3.4–5.3)
PROT SERPL-MCNC: 8.5 G/DL (ref 6.8–8.8)
RBC # BLD AUTO: 4.38 10E12/L (ref 4.4–5.9)
RBC #/AREA URNS AUTO: <1 /HPF (ref 0–2)
SODIUM SERPL-SCNC: 138 MMOL/L (ref 133–144)
SOURCE: ABNORMAL
SP GR UR STRIP: 1.01 (ref 1–1.03)
TROPONIN I SERPL-MCNC: 0.03 UG/L (ref 0–0.04)
UROBILINOGEN UR STRIP-MCNC: NORMAL MG/DL (ref 0–2)
WBC # BLD AUTO: 19.9 10E9/L (ref 4–11)
WBC #/AREA URNS AUTO: 3 /HPF (ref 0–5)

## 2018-09-05 PROCEDURE — 96365 THER/PROPH/DIAG IV INF INIT: CPT

## 2018-09-05 PROCEDURE — 99223 1ST HOSP IP/OBS HIGH 75: CPT | Mod: AI | Performed by: HOSPITALIST

## 2018-09-05 PROCEDURE — 86140 C-REACTIVE PROTEIN: CPT | Performed by: EMERGENCY MEDICINE

## 2018-09-05 PROCEDURE — 99285 EMERGENCY DEPT VISIT HI MDM: CPT | Mod: 25

## 2018-09-05 PROCEDURE — 83605 ASSAY OF LACTIC ACID: CPT | Performed by: EMERGENCY MEDICINE

## 2018-09-05 PROCEDURE — A9270 NON-COVERED ITEM OR SERVICE: HCPCS | Mod: GY | Performed by: EMERGENCY MEDICINE

## 2018-09-05 PROCEDURE — 84484 ASSAY OF TROPONIN QUANT: CPT | Performed by: EMERGENCY MEDICINE

## 2018-09-05 PROCEDURE — 36415 COLL VENOUS BLD VENIPUNCTURE: CPT | Performed by: HOSPITALIST

## 2018-09-05 PROCEDURE — 93005 ELECTROCARDIOGRAM TRACING: CPT

## 2018-09-05 PROCEDURE — A9270 NON-COVERED ITEM OR SERVICE: HCPCS | Mod: GY | Performed by: HOSPITALIST

## 2018-09-05 PROCEDURE — 25000128 H RX IP 250 OP 636: Performed by: HOSPITALIST

## 2018-09-05 PROCEDURE — 76705 ECHO EXAM OF ABDOMEN: CPT

## 2018-09-05 PROCEDURE — 87493 C DIFF AMPLIFIED PROBE: CPT | Performed by: HOSPITALIST

## 2018-09-05 PROCEDURE — 36415 COLL VENOUS BLD VENIPUNCTURE: CPT

## 2018-09-05 PROCEDURE — 84132 ASSAY OF SERUM POTASSIUM: CPT | Performed by: HOSPITALIST

## 2018-09-05 PROCEDURE — 96361 HYDRATE IV INFUSION ADD-ON: CPT

## 2018-09-05 PROCEDURE — 25000132 ZZH RX MED GY IP 250 OP 250 PS 637: Mod: GY | Performed by: HOSPITALIST

## 2018-09-05 PROCEDURE — 83690 ASSAY OF LIPASE: CPT | Performed by: EMERGENCY MEDICINE

## 2018-09-05 PROCEDURE — 25000132 ZZH RX MED GY IP 250 OP 250 PS 637: Mod: GY | Performed by: EMERGENCY MEDICINE

## 2018-09-05 PROCEDURE — 99214 OFFICE O/P EST MOD 30 MIN: CPT | Performed by: PHYSICIAN ASSISTANT

## 2018-09-05 PROCEDURE — 83735 ASSAY OF MAGNESIUM: CPT | Performed by: EMERGENCY MEDICINE

## 2018-09-05 PROCEDURE — 25000125 ZZHC RX 250: Performed by: EMERGENCY MEDICINE

## 2018-09-05 PROCEDURE — 87506 IADNA-DNA/RNA PROBE TQ 6-11: CPT | Performed by: HOSPITALIST

## 2018-09-05 PROCEDURE — 25000128 H RX IP 250 OP 636: Performed by: EMERGENCY MEDICINE

## 2018-09-05 PROCEDURE — 71046 X-RAY EXAM CHEST 2 VIEWS: CPT

## 2018-09-05 PROCEDURE — 85025 COMPLETE CBC W/AUTO DIFF WBC: CPT | Performed by: EMERGENCY MEDICINE

## 2018-09-05 PROCEDURE — 80053 COMPREHEN METABOLIC PANEL: CPT | Performed by: EMERGENCY MEDICINE

## 2018-09-05 PROCEDURE — 12000000 ZZH R&B MED SURG/OB

## 2018-09-05 PROCEDURE — 81001 URINALYSIS AUTO W/SCOPE: CPT | Performed by: EMERGENCY MEDICINE

## 2018-09-05 PROCEDURE — 74176 CT ABD & PELVIS W/O CONTRAST: CPT

## 2018-09-05 PROCEDURE — 87040 BLOOD CULTURE FOR BACTERIA: CPT | Performed by: EMERGENCY MEDICINE

## 2018-09-05 RX ORDER — NALOXONE HYDROCHLORIDE 0.4 MG/ML
.1-.4 INJECTION, SOLUTION INTRAMUSCULAR; INTRAVENOUS; SUBCUTANEOUS
Status: DISCONTINUED | OUTPATIENT
Start: 2018-09-05 | End: 2018-09-08

## 2018-09-05 RX ORDER — LANOLIN ALCOHOL/MO/W.PET/CERES
2000 CREAM (GRAM) TOPICAL
Status: DISCONTINUED | OUTPATIENT
Start: 2018-09-09 | End: 2018-09-10 | Stop reason: HOSPADM

## 2018-09-05 RX ORDER — ASPIRIN 81 MG/1
81 TABLET, CHEWABLE ORAL EVERY EVENING
Status: DISCONTINUED | OUTPATIENT
Start: 2018-09-05 | End: 2018-09-10 | Stop reason: HOSPADM

## 2018-09-05 RX ORDER — MAGNESIUM SULFATE HEPTAHYDRATE 40 MG/ML
4 INJECTION, SOLUTION INTRAVENOUS EVERY 4 HOURS PRN
Status: DISCONTINUED | OUTPATIENT
Start: 2018-09-05 | End: 2018-09-10 | Stop reason: HOSPADM

## 2018-09-05 RX ORDER — SODIUM CHLORIDE 9 MG/ML
INJECTION, SOLUTION INTRAVENOUS CONTINUOUS
Status: DISCONTINUED | OUTPATIENT
Start: 2018-09-05 | End: 2018-09-06

## 2018-09-05 RX ORDER — POTASSIUM CHLORIDE 1500 MG/1
40 TABLET, EXTENDED RELEASE ORAL ONCE
Status: DISCONTINUED | OUTPATIENT
Start: 2018-09-05 | End: 2018-09-05

## 2018-09-05 RX ORDER — POTASSIUM CHLORIDE 1.5 G/1.58G
20-40 POWDER, FOR SOLUTION ORAL
Status: DISCONTINUED | OUTPATIENT
Start: 2018-09-05 | End: 2018-09-10 | Stop reason: HOSPADM

## 2018-09-05 RX ORDER — POTASSIUM CHLORIDE 7.45 MG/ML
10 INJECTION INTRAVENOUS
Status: DISCONTINUED | OUTPATIENT
Start: 2018-09-05 | End: 2018-09-10 | Stop reason: HOSPADM

## 2018-09-05 RX ORDER — ONDANSETRON 2 MG/ML
4 INJECTION INTRAMUSCULAR; INTRAVENOUS EVERY 6 HOURS PRN
Status: DISCONTINUED | OUTPATIENT
Start: 2018-09-05 | End: 2018-09-10 | Stop reason: HOSPADM

## 2018-09-05 RX ORDER — POTASSIUM CHLORIDE 29.8 MG/ML
20 INJECTION INTRAVENOUS
Status: DISCONTINUED | OUTPATIENT
Start: 2018-09-05 | End: 2018-09-10 | Stop reason: HOSPADM

## 2018-09-05 RX ORDER — POTASSIUM CHLORIDE 1.5 G/1.58G
40 POWDER, FOR SOLUTION ORAL ONCE
Status: COMPLETED | OUTPATIENT
Start: 2018-09-05 | End: 2018-09-05

## 2018-09-05 RX ORDER — ONDANSETRON 4 MG/1
4 TABLET, ORALLY DISINTEGRATING ORAL EVERY 6 HOURS PRN
Status: DISCONTINUED | OUTPATIENT
Start: 2018-09-05 | End: 2018-09-10 | Stop reason: HOSPADM

## 2018-09-05 RX ORDER — POTASSIUM CL/LIDO/0.9 % NACL 10MEQ/0.1L
10 INTRAVENOUS SOLUTION, PIGGYBACK (ML) INTRAVENOUS ONCE
Status: COMPLETED | OUTPATIENT
Start: 2018-09-05 | End: 2018-09-05

## 2018-09-05 RX ORDER — LIDOCAINE 40 MG/G
CREAM TOPICAL
Status: DISCONTINUED | OUTPATIENT
Start: 2018-09-05 | End: 2018-09-10 | Stop reason: HOSPADM

## 2018-09-05 RX ORDER — MAGNESIUM SULFATE HEPTAHYDRATE 40 MG/ML
2 INJECTION, SOLUTION INTRAVENOUS DAILY PRN
Status: DISCONTINUED | OUTPATIENT
Start: 2018-09-05 | End: 2018-09-10 | Stop reason: HOSPADM

## 2018-09-05 RX ORDER — ACETAMINOPHEN 325 MG/1
650 TABLET ORAL EVERY 4 HOURS PRN
Status: DISCONTINUED | OUTPATIENT
Start: 2018-09-05 | End: 2018-09-10 | Stop reason: HOSPADM

## 2018-09-05 RX ORDER — PROCHLORPERAZINE MALEATE 5 MG
5 TABLET ORAL EVERY 6 HOURS PRN
Status: DISCONTINUED | OUTPATIENT
Start: 2018-09-05 | End: 2018-09-10 | Stop reason: HOSPADM

## 2018-09-05 RX ORDER — GABAPENTIN 300 MG/1
600 CAPSULE ORAL EVERY EVENING
Status: DISCONTINUED | OUTPATIENT
Start: 2018-09-05 | End: 2018-09-10 | Stop reason: HOSPADM

## 2018-09-05 RX ORDER — SODIUM CHLORIDE 9 MG/ML
1000 INJECTION, SOLUTION INTRAVENOUS CONTINUOUS
Status: DISCONTINUED | OUTPATIENT
Start: 2018-09-05 | End: 2018-09-05

## 2018-09-05 RX ORDER — ATORVASTATIN CALCIUM 40 MG/1
40 TABLET, FILM COATED ORAL EVERY EVENING
Status: DISCONTINUED | OUTPATIENT
Start: 2018-09-05 | End: 2018-09-06

## 2018-09-05 RX ORDER — PROCHLORPERAZINE 25 MG
12.5 SUPPOSITORY, RECTAL RECTAL EVERY 12 HOURS PRN
Status: DISCONTINUED | OUTPATIENT
Start: 2018-09-05 | End: 2018-09-10 | Stop reason: HOSPADM

## 2018-09-05 RX ORDER — POTASSIUM CHLORIDE 1500 MG/1
20-40 TABLET, EXTENDED RELEASE ORAL
Status: DISCONTINUED | OUTPATIENT
Start: 2018-09-05 | End: 2018-09-10 | Stop reason: HOSPADM

## 2018-09-05 RX ORDER — POTASSIUM CL/LIDO/0.9 % NACL 10MEQ/0.1L
10 INTRAVENOUS SOLUTION, PIGGYBACK (ML) INTRAVENOUS
Status: DISCONTINUED | OUTPATIENT
Start: 2018-09-05 | End: 2018-09-10 | Stop reason: HOSPADM

## 2018-09-05 RX ADMIN — POTASSIUM CHLORIDE 10 MEQ: 2 INJECTION, SOLUTION, CONCENTRATE INTRAVENOUS at 17:45

## 2018-09-05 RX ADMIN — SODIUM CHLORIDE: 9 INJECTION, SOLUTION INTRAVENOUS at 21:47

## 2018-09-05 RX ADMIN — GABAPENTIN 600 MG: 300 CAPSULE ORAL at 21:52

## 2018-09-05 RX ADMIN — ASPIRIN 81 MG 81 MG: 81 TABLET ORAL at 21:52

## 2018-09-05 RX ADMIN — SODIUM CHLORIDE 1000 ML: 9 INJECTION, SOLUTION INTRAVENOUS at 16:52

## 2018-09-05 RX ADMIN — POTASSIUM CHLORIDE 40 MEQ: 1.5 POWDER, FOR SOLUTION ORAL at 23:51

## 2018-09-05 RX ADMIN — ATORVASTATIN CALCIUM 40 MG: 40 TABLET, FILM COATED ORAL at 21:52

## 2018-09-05 RX ADMIN — SODIUM CHLORIDE 1000 ML: 9 INJECTION, SOLUTION INTRAVENOUS at 19:27

## 2018-09-05 RX ADMIN — APIXABAN 5 MG: 5 TABLET, FILM COATED ORAL at 21:52

## 2018-09-05 RX ADMIN — POTASSIUM CHLORIDE 40 MEQ: 1.5 POWDER, FOR SOLUTION ORAL at 17:39

## 2018-09-05 RX ADMIN — SODIUM CHLORIDE 1000 ML: 9 INJECTION, SOLUTION INTRAVENOUS at 17:41

## 2018-09-05 ASSESSMENT — ENCOUNTER SYMPTOMS
SHORTNESS OF BREATH: 0
DIARRHEA: 1
NAUSEA: 0
ABDOMINAL PAIN: 0
VOMITING: 0
BLOOD IN STOOL: 0
COUGH: 1
CHILLS: 1
DIZZINESS: 0
FEVER: 0

## 2018-09-05 NOTE — MR AVS SNAPSHOT
After Visit Summary   9/5/2018    Efrem Ramos    MRN: [id number]           Patient Information     Date Of Birth          1945        Visit Information        Provider Department      9/5/2018 2:05 PM Juliana Bauman PA-C [facility]        Today's Diagnoses     Weakness generalized    -  1       Follow-ups after your visit        Your next 10 appointments already scheduled     Sep 19, 2018  1:15 PM CDT   Return Visit with  Oncology Nurse   [facility]    [facility]  [address]  [place] [address]   [phone]            Sep 26, 2018  1:00 PM CDT   Return Visit with Shae Aldana MD   [facility] ([facility]    [facility]  [address]  [place] [address]   [phone]            Aug 14, 2019  1:30 PM CDT   Return Sleep Patient with Ron Pacheco MD   [facility] [place] ([facility] [place])    [address]  [place] [address]   [phone]              Who to contact     If you have questions or need follow up information about today's clinic visit or your schedule please contact [facility] directly at [phone].  Normal or non-critical lab and imaging results will be communicated to you by MyChart, letter or phone within 4 business days after the clinic has received the results. If you do not hear from us within 7 days, please contact the clinic through [employer] or phone. If you have a critical or abnormal lab result, we will notify you by phone as soon as possible.  Submit refill requests through [employer] or call your pharmacy and they will forward the refill request to us. Please allow 3 business days for your refill to be completed.          Additional Information About Your Visit        [employer] Information     [employer] gives you secure  access to your electronic health record. If you see a primary care provider, you can also send messages to your care team and make appointments. If you have questions, please call your primary care clinic.  If you do not have a primary care provider, please call [phone] and they will assist you.        Care EveryWhere ID     This is your Care EveryWhere ID. This could be used by other organizations to access your [facility] medical records  [id number]        Your Vitals Were     Pulse Temperature Pulse Oximetry BMI (Body Mass Index)          65 97.8  F (36.6  C) (Oral) 97% 25.77 kg/m2         Blood Pressure from Last 3 Encounters:   09/05/18 92/61   08/15/18 110/73   05/30/18 105/70    Weight from Last 3 Encounters:   09/05/18 172 lb (78 kg)   08/15/18 188 lb 9.6 oz (85.5 kg)   05/30/18 181 lb (82.1 kg)              Today, you had the following     No orders found for display       Primary Care Provider Office Phone # Fax #    Danny Paige [phone] [address]  [place] [address]        Equal Access to Services     MON[facility] CenterJULIA : Hadii aad ku hadasho Sojonasali, waaxda luqadaha, qaybta kaalmada adeegyada, alfonzo olvera . So [facility] [phone].    ATENCIÓN: Si habla español, tiene a palomo disposición servicios gratuitos de asistencia lingüística. [facility] [phone].    We comply with applicable federal civil rights laws and [place] laws. We do not discriminate on the basis of race, color, national origin, age, disability, sex, sexual orientation, or gender identity.            Thank you!     Thank you for choosing [facility]  for your care. Our goal is always to provide you with excellent care. Hearing back from our patients is one way we can continue to improve our services. Please take a few minutes to complete the written survey that you may receive in the mail after your visit with us. Thank you!              Your Updated Medication List - Protect others around you: Learn how to safely use, store and throw away your medicines at www.disposemymeds.org.          This list is accurate as of 9/5/18  3:10 PM.  Always use your most recent med list.                   Brand Name Dispense Instructions for use Diagnosis    acetaminophen 500 MG tablet    TYLENOL     Take 1,000 mg by mouth every 6 hours as needed for mild pain        apixaban ANTICOAGULANT 5 MG tablet    ELIQUIS    180 tablet    Take 1 tablet (5 mg) by mouth 2 times daily    Atrial fibrillation and flutter (H)       ASPIRIN PO      Take 81 mg by mouth every morning        atorvastatin 40 MG tablet    LIPITOR    90 tablet    Take 1 tablet (40 mg) by mouth daily    Coronary artery disease involving native coronary artery of native heart with angina pectoris (H)       Cyanocobalamin 2000 MCG Tabs      Take 2,000 mcg by mouth every 7 days        gabapentin 300 MG capsule    NEURONTIN    60 capsule    Take 2 capsules (600 mg) by mouth every evening    Restless legs syndrome (RLS)       metoprolol succinate 25 MG 24 hr tablet    TOPROL-XL    45 tablet    Take 0.5 tablets (12.5 mg) by mouth daily    Nonrheumatic aortic valve stenosis       multivitamin Tabs tablet      Take 1 tablet by mouth 2 times daily

## 2018-09-05 NOTE — IP AVS SNAPSHOT
"          [place] [address] [facility]: [phone]                                              INTERAGENCY TRANSFER FORM - LAB / IMAGING / EKG / EMG RESULTS   2018                    Hospital Admission Date: 2018  PHANI AVITIA   : 1945  Sex: Male        Attending Provider: Aidan Zazueta MD     Allergies:  Adhesive Tape, Amiodarone, Lasix [Furosemide], Penicillins, Sulfa Drugs    Infection:  None   Service:  HOSPITALIST    Ht:  1.727 m (5' 8\")   Wt:  84.5 kg (186 lb 3.2 oz)   Admission Wt:  78 kg (172 lb)    BMI:  28.31 kg/m 2   BSA:  2.01 m 2            Patient PCP Information     Provider PCP Type    Danny Paige MD, MD General         Lab Results - 3 Days      Magnesium [[id number]]  Resulted: 09/10/18 1009, Result status: Final result    Ordering provider: Emeli Reyes MD  09/10/18 0750 Resulting lab: [facility]    Specimen Information    Type Source Collected On     09/10/18 0750          Components       Value Reference Range Flag Lab   Magnesium 1.8 1.6 - 2.3 mg/dL  FrStHsLb            Comprehensive metabolic panel [[id number]] (Abnormal)  Resulted: 09/10/18 0841, Result status: Final result    Ordering provider: Emeli Reyes MD  09/10/18 0000 Resulting lab: [facility]    Specimen Information    Type Source Collected On   Blood  09/10/18 0750          Components       Value Reference Range Flag Lab   Sodium 141 133 - 144 mmol/L  FrStHsLb   Potassium 4.3 3.4 - 5.3 mmol/L  FrStHsLb   Chloride 114 94 - 109 mmol/L H FrStHsLb   Carbon Dioxide 19 20 - 32 mmol/L L FrStHsLb   Anion Gap 8 3 - 14 mmol/L  FrStHsLb   Glucose 95 70 - 99 mg/dL  FrStHsLb   Urea Nitrogen 7 7 - 30 mg/dL  FrStHsLb   Creatinine 0.69 0.66 - 1.25 mg/dL  FrStHsLb   GFR Estimate >90 >60 mL/min/1.7m2  FrStHsLb   Comment:  Non  GFR Calc   GFR Estimate If Black >90 >60 mL/min/1.7m2  FrStHsLb   Comment:  African American GFR Calc   Calcium 7.4 " 8.5 - 10.1 mg/dL L FrStHsLb   Bilirubin Total 0.9 0.2 - 1.3 mg/dL  FrStHsLb   Albumin 2.5 3.4 - 5.0 g/dL L FrStHsLb   Protein Total 6.9 6.8 - 8.8 g/dL  FrStHsLb   Alkaline Phosphatase 424 40 - 150 U/L H FrStHsLb    0 - 70 U/L H FrStHsLb    0 - 45 U/L H FrStHsLb            Bilirubin direct [[id number]] (Abnormal)  Resulted: 09/10/18 0841, Result status: Final result    Ordering provider: Emeli Reyes MD  09/10/18 0601 Resulting lab: [facility]    Specimen Information    Type Source Collected On     09/10/18 0750          Components       Value Reference Range Flag Lab   Bilirubin Direct 0.4 0.0 - 0.2 mg/dL H FrStHsLb            CBC with platelets [[id number]] (Abnormal)  Resulted: 09/10/18 0819, Result status: Final result    Ordering provider: Emeli Reyes MD  09/10/18 0000 Resulting lab: [facility]    Specimen Information    Type Source Collected On   Blood  09/10/18 0750          Components       Value Reference Range Flag Lab   WBC 11.2 4.0 - 11.0 10e9/L H FrStHsLb   RBC Count 3.48 4.4 - 5.9 10e12/L L FrStHsLb   Hemoglobin 11.2 13.3 - 17.7 g/dL L FrStHsLb   Hematocrit 33.2 40.0 - 53.0 % L FrStHsLb   MCV 95 78 - 100 fl  FrStHsLb   MCH 32.2 26.5 - 33.0 pg  FrStHsLb   MCHC 33.7 31.5 - 36.5 g/dL  FrStHsLb   RDW 14.9 10.0 - 15.0 %  FrStHsLb   Platelet Count 294 150 - 450 10e9/L  FrStHsLb            Blood culture [[id number]]  Resulted: 09/10/18 0742, Result status: Preliminary result    Ordering provider: Yosvany Salomon MD  09/05/18 1933 Resulting lab: [facility]    Specimen Information    Type Source Collected On   Blood Arm, Right 09/05/18 1940   Comment:  Right Arm          Components       Value Reference Range Flag Lab   Specimen Description Blood Right Arm      Special Requests Aerobic and anaerobic bottles received   FrStHsLb   Culture Micro No growth after 5 days   225            Blood culture [[id number]]  Resulted: 09/10/18  0742, Result status: Preliminary result    Ordering provider: Yosvany Salomon MD  09/05/18 1933 Resulting lab: [facility]    Specimen Information    Type Source Collected On   Blood Arm, Right 09/05/18 2020   Comment:  Right Arm          Components       Value Reference Range Flag Lab   Specimen Description Blood Right Arm      Special Requests Aerobic and anaerobic bottles received   FrStHsLb   Culture Micro No growth after 5 days   225            Surgical pathology exam [[id number]]  Resulted: 09/10/18 0718, Result status: In process    Ordering provider: Xander Marie MD  09/08/18 1157 Resulting lab: [facility]    Specimen Information    Type Source Collected On   Biopsy Gastro Esophageal Junction 09/08/18 1157            Protein electrophoresis [[id number]]  Resulted: 09/09/18 1929, Result status: In process    Ordering provider: Xander Marie MD  09/08/18 2342 Resulting lab: [facility]    Specimen Information    Type Source Collected On   Blood  09/09/18 0601          Components       Value Reference Range Flag Lab   Albumin Fraction PENDING 3.7 - 5.1 g/dL     Alpha 1 Fraction PENDING 0.2 - 0.4 g/dL     Alpha 2 Fraction PENDING 0.5 - 0.9 g/dL     Beta Fraction PENDING 0.6 - 1.0 g/dL     Gamma Fraction PENDING 0.7 - 1.6 g/dL     Monoclonal Peak PENDING 0.0 g/dL     ELP Interpretation: PENDING               Potassium [[id number]]  Resulted: 09/09/18 1828, Result status: Final result    Ordering provider: Emeli Reyes MD  09/09/18 1610 Resulting lab: [facility]    Specimen Information    Type Source Collected On   Blood  09/09/18 1745          Components       Value Reference Range Flag Lab   Potassium 4.1 3.4 - 5.3 mmol/L  FrStHsLb            Hepatitis A antibody IgM [[id number]]  Resulted: 09/09/18 1532, Result status: In process    Ordering provider: Xander Marie MD  09/09/18 1001 Resulting lab: [facility]    Specimen Information    Type Source  Collected On   Blood  09/09/18 0601            Hepatitis B surface antigen [[id number]]  Resulted: 09/09/18 1532, Result status: In process    Ordering provider: Xander Marie MD  09/09/18 1001 Resulting lab: [facility]    Specimen Information    Type Source Collected On   Blood  09/09/18 0601            Hepatitis C antibody [[id number]]  Resulted: 09/09/18 1532, Result status: In process    Ordering provider: Xander Marie MD  09/09/18 1001 Resulting lab: [facility]    Specimen Information    Type Source Collected On   Blood  09/09/18 0601            Basic metabolic panel [[id number]] (Abnormal)  Resulted: 09/09/18 1446, Result status: Final result    Ordering provider: Emeli Reyes MD  09/08/18 2342 Resulting lab: [facility]    Specimen Information    Type Source Collected On   Blood  09/09/18 0601          Components       Value Reference Range Flag Lab   Sodium 142 133 - 144 mmol/L  FrStHsLb   Potassium 4.0 3.4 - 5.3 mmol/L  FrStHsLb   Chloride 115 94 - 109 mmol/L H FrStHsLb   Carbon Dioxide 19 20 - 32 mmol/L L FrStHsLb   Anion Gap 8 3 - 14 mmol/L  FrStHsLb   Glucose 82 70 - 99 mg/dL  FrStHsLb   Urea Nitrogen 9 7 - 30 mg/dL  FrStHsLb   Creatinine 0.79 0.66 - 1.25 mg/dL  FrStHsLb   GFR Estimate GFR not calculated, patient <16 years old. >60 mL/min/1.7m2  FrStHsLb   Comment:  Non  GFR Calc   GFR Estimate If Black GFR not calculated, patient <16 years old. >60 mL/min/1.7m2  FrStHsLb   Comment:  African American GFR Calc   Calcium 7.2 8.5 - 10.1 mg/dL L FrStHsLb            Hepatic panel [[id number]] (Abnormal)  Resulted: 09/09/18 1443, Result status: Final result    Ordering provider: Emeli Reyes MD  09/08/18 2342 Resulting lab: [facility]    Specimen Information    Type Source Collected On   Blood  09/09/18 0601          Components       Value Reference Range Flag Lab   Bilirubin Direct 0.6 0.0 - 0.2 mg/dL H FrStHsLb   Bilirubin Total 1.3 0.2 - 1.3 mg/dL   FrStHsLb   Albumin 2.4 3.4 - 5.0 g/dL L FrStHsLb   Protein Total 6.4 6.8 - 8.8 g/dL L FrStHsLb   Alkaline Phosphatase 448 40 - 150 U/L H FrStHsLb    0 - 70 U/L H FrStHsLb    0 - 45 U/L H FrStHsLb            CBC with platelets [[id number]] (Abnormal)  Resulted: 09/09/18 1324, Result status: Final result    Ordering provider: Emeli Reyes MD  09/08/18 2342 Resulting lab: [facility]    Specimen Information    Type Source Collected On   Blood  09/09/18 0601          Components       Value Reference Range Flag Lab   WBC 11.6 4.0 - 11.0 10e9/L H FrStHsLb   RBC Count 3.40 4.4 - 5.9 10e12/L L FrStHsLb   Hemoglobin 11.0 13.3 - 17.7 g/dL L FrStHsLb   Hematocrit 32.5 40.0 - 53.0 % L FrStHsLb   MCV 96 78 - 100 fl  FrStHsLb   MCH 32.4 26.5 - 33.0 pg  FrStHsLb   MCHC 33.8 31.5 - 36.5 g/dL  FrStHsLb   RDW 14.8 10.0 - 15.0 %  FrStHsLb   Platelet Count 263 150 - 450 10e9/L  FrStHsLb            Mitochondrial M2 Antibody IgG [[id number]]  Resulted: 09/09/18 1323, Result status: In process    Ordering provider: Xander Marie MD  09/08/18 2342 Resulting lab: [facility]    Specimen Information    Type Source Collected On   Blood  09/09/18 0601            Anti Nuclear Kiesha IgG by IFA with Reflex [[id number]]  Resulted: 09/09/18 1323, Result status: In process    Ordering provider: Xander Marie MD  09/08/18 2342 Resulting lab: [facility]    Specimen Information    Type Source Collected On   Blood  09/09/18 0601            F Actin EIA with reflex [[id number]]  Resulted: 09/09/18 1323, Result status: In process    Ordering provider: Xander Marie MD  09/08/18 2342 Resulting lab: [facility]    Specimen Information    Type Source Collected On   Blood  09/09/18 0601            Magnesium [[id number]] (Abnormal)  Resulted: 09/08/18 1644, Result status: Edited Result - FINAL    Ordering provider: Emeli Reyes MD  09/08/18 1400 Resulting lab: [facility]    Specimen Information    Type  Source Collected On   Blood  09/08/18 1555          Components       Value Reference Range Flag Lab   Magnesium 2.8 1.6 - 2.3 mg/dL H FrStHsLb   Comment:  Drawn above stopped IV            Lactic acid level STAT for sepsis protocol [[id number]]  Resulted: 09/08/18 1628, Result status: Final result    Ordering provider: Emeli Reyes MD  09/08/18 1556 Resulting lab: [facility]    Specimen Information    Type Source Collected On   Blood  09/08/18 1555          Components       Value Reference Range Flag Lab   Lactate for Sepsis Protocol 1.2 0.7 - 2.0 mmol/L  FrStHsLb   Comment:  Drawn above stopped IV            Hepatitis B surface antigen [[id number]]  Resulted: 09/08/18 1617, Result status: In process    Ordering provider: Xander Marie MD  09/08/18 0902 Resulting lab: [facility]    Specimen Information    Type Source Collected On   Blood  09/08/18 1555            Hepatitis C antibody [[id number]]  Resulted: 09/08/18 1617, Result status: In process    Ordering provider: Xander Marie MD  09/08/18 0902 Resulting lab: [facility]    Specimen Information    Type Source Collected On   Blood  09/08/18 1555            Hepatitis A antibody IgM [[id number]]  Resulted: 09/08/18 1617, Result status: In process    Ordering provider: Xander Marie MD  09/08/18 0902 Resulting lab: [facility]    Specimen Information    Type Source Collected On   Blood  09/08/18 1555            Ferritin [[id number]] (Abnormal)  Resulted: 09/08/18 1003, Result status: Final result    Ordering provider: Emeli Reyes MD  09/08/18 0839 Resulting lab: [facility]    Specimen Information    Type Source Collected On     09/08/18 0839          Components       Value Reference Range Flag Lab   Ferritin 584 26 - 388 ng/mL H FrStHsLb            Magnesium [[id number]] (Abnormal)  Resulted: 09/08/18 0948, Result status: Final result    Ordering provider: Emeli Reyes MD  09/08/18 0839 Resulting lab: [facility]    Specimen Information    Type Source Collected On     09/08/18 0839          Components       Value Reference Range Flag Lab   Magnesium 1.5 1.6 - 2.3 mg/dL L FrStHsLb            Comprehensive metabolic panel [[id number]] (Abnormal)  Resulted: 09/08/18 0905, Result status: Final result    Ordering provider: Emeli Reyes MD  09/08/18 0001 Resulting lab: [facility]    Specimen Information    Type Source Collected On   Blood  09/08/18 0839          Components       Value Reference Range Flag Lab   Sodium 141 133 - 144 mmol/L  FrStHsLb   Potassium 3.9 3.4 - 5.3 mmol/L  FrStHsLb   Chloride 115 94 - 109 mmol/L H FrStHsLb   Carbon Dioxide 18 20 - 32 mmol/L L FrStHsLb   Anion Gap 8 3 - 14 mmol/L  FrStHsLb   Glucose 102 70 - 99 mg/dL H FrStHsLb   Urea Nitrogen 13 7 - 30 mg/dL  FrStHsLb   Creatinine 0.85 0.66 - 1.25 mg/dL  FrStHsLb   GFR Estimate 88 >60 mL/min/1.7m2  FrStHsLb   Comment:  Non  GFR Calc   GFR Estimate If Black >90 >60 mL/min/1.7m2  FrStHsLb   Comment:  African American GFR Calc   Calcium 7.2 8.5 - 10.1 mg/dL L FrStHsLb   Bilirubin Total 1.5 0.2 - 1.3 mg/dL H FrStHsLb   Albumin 2.4 3.4 - 5.0 g/dL L FrStHsLb   Protein Total 6.4 6.8 - 8.8 g/dL L FrStHsLb   Alkaline Phosphatase 283 40 - 150 U/L H FrStHsLb    0 - 70 U/L H FrStHsLb    0 - 45 U/L H FrStHsLb            Basic metabolic panel [[id number]] (Abnormal)  Resulted: 09/07/18 0920, Result status: Final result    Ordering provider: Xochilt Cervantes MD  09/07/18 0001 Resulting lab: [facility]    Specimen Information    Type Source Collected On   Blood  09/07/18 0835          Components       Value Reference Range Flag Lab   Sodium 143 133 - 144 mmol/L  FrStHsLb   Potassium 3.4 3.4 - 5.3 mmol/L  FrStHsLb   Chloride 113 94 - 109 mmol/L H FrStHsLb   Carbon Dioxide 20 20 - 32 mmol/L  FrStHsLb   Anion Gap 10 3 - 14 mmol/L  FrStHsLb   Glucose 77 70 - 99 mg/dL  FrStHsLb   Urea  Nitrogen 13 7 - 30 mg/dL  FrStHsLb   Creatinine 1.06 0.66 - 1.25 mg/dL  FrStHsLb   GFR Estimate 68 >60 mL/min/1.7m2  FrStHsLb   Comment:  Non  GFR Calc   GFR Estimate If Black 82 >60 mL/min/1.7m2  FrStHsLb   Comment:  African American GFR Calc   Calcium 7.0 8.5 - 10.1 mg/dL L FrStHsLb            Hepatic panel [[id number]] (Abnormal)  Resulted: 09/07/18 0920, Result status: Final result    Ordering provider: Xochilt Cervantes MD  09/07/18 0001 Resulting lab: [facility]    Specimen Information    Type Source Collected On   Blood  09/07/18 0835          Components       Value Reference Range Flag Lab   Bilirubin Direct 0.6 0.0 - 0.2 mg/dL H FrStHsLb   Bilirubin Total 1.3 0.2 - 1.3 mg/dL  FrStHsLb   Albumin 2.4 3.4 - 5.0 g/dL L FrStHsLb   Protein Total 6.3 6.8 - 8.8 g/dL L FrStHsLb   Alkaline Phosphatase 312 40 - 150 U/L H FrStHsLb    0 - 70 U/L H FrStHsLb    0 - 45 U/L H FrStHsLb            CBC with platelets [[id number]] (Abnormal)  Resulted: 09/07/18 0901, Result status: Final result    Ordering provider: Xochilt Cervantes MD  09/07/18 0001 Resulting lab: [facility]    Specimen Information    Type Source Collected On   Blood  09/07/18 0835          Components       Value Reference Range Flag Lab   WBC 12.7 4.0 - 11.0 10e9/L H FrStHsLb   RBC Count 3.54 4.4 - 5.9 10e12/L L FrStHsLb   Hemoglobin 11.7 13.3 - 17.7 g/dL L FrStHsLb   Hematocrit 33.6 40.0 - 53.0 % L FrStHsLb   MCV 95 78 - 100 fl  FrStHsLb   MCH 33.1 26.5 - 33.0 pg H FrStHsLb   MCHC 34.8 31.5 - 36.5 g/dL  FrStHsLb   RDW 14.6 10.0 - 15.0 %  FrStHsLb   Platelet Count 262 150 - 450 10e9/L  FrStHsLb            Testing Performed By     Lab - Abbreviation Name Director Address Valid Date Range    14 - FrStHsLb [facility] Unknown [address] [place] [address] 05/08/15 1057 - Present    45 - JGL241 [facility] Unknown Unknown 01/28/02 0000 - Present    88 - Unknown [facility] Unknown  "Unknown 10/30/02 0000 - Present    225 - Unknown [facility] Unknown [address] [facility] [address] 12/19/14 0954 - Present            Unresulted Labs (24h ago through future)    Start       Ordered    Unscheduled  Potassium  (Potassium Replacement - \"High\" - Replacement for all levels less than 4.1 mmol/L - UU,UR,UA,RH,SH,PH,WY )  CONDITIONAL (SPECIFY),   Routine     Comments:  Obtain Potassium Level for these conditions:  *IF no potassium result within 24 hrs before initiation of order set, draw potassium level with next lab collect.    *2 HOURS AFTER last IV potassium replacement dose and 4 hours after an oral replacement dose when potassium replacement given for level less than 3.4.  *Next morning after potassium dose.     Repeat Potassium Replacement if necessary.    09/05/18 2125    Unscheduled  Magnesium  (Magnesium Replacement - Adult - \"High\" - Replacement for all levels less than or equal to 2 mg/dL)  CONDITIONAL (SPECIFY),   Routine     Comments:  Obtain Magnesium Level for these conditions:  *IF no magnesium result within 24 hrs before initiation of order set, draw magnesium level with next lab collect.    *2 HOURS AFTER last magnesium replacement dose when magnesium replacement given for level less than 1.6  *Next morning after magnesium dose.     Repeat Magnesium Replacement if necessary.    09/05/18 2125         Imaging Results - 3 Days      XR Video Swallow w/o Esophagram [[id number]]  Resulted: 09/07/18 1007, Result status: Final result    Ordering provider: Xochilt Cervantes MD  09/06/18 1331 Resulted by: Perry Ramirez MD    Performed: 09/07/18 0928 - 09/07/18 0929 Resulting lab: RADIOLOGY RESULTS    Narrative:       VIDEO SWALLOWING EVALUATION   9/7/2018 9:29 AM     HISTORY: Status post CA of the VC and stroke. Globus sensation with  swallowing.     COMPARISON: 6/30/2015.    FLUOROSCOPY TIME: 4.1 minutes. 11 cine video clips were obtained. "     FINDINGS:    Solid: Minimal residue. No aspiration or penetration.    Pill: Difficulty with oral transit. No aspiration. There was some  esophageal dysmotility. There was failure of relaxation of the lower  esophageal sphincter during the exam. The liquid readily passed the  lower esophageal sphincter but the pill did not pass during the exam.    Pudding: Mild residue. No aspiration or penetration.    Honey: Not tested.    Nectar: Not tested.    Thin: Multiple swallows with penetration. One swallow did produce  aspiration without cough. There was moderate residue. There was  premature spillage into both the vallecula and piriform sinuses. The  penetration improved with chin tuck maneuver.    This study only includes the cervical esophagus.  For more detailed  information, please see speech therapy report.    JOSE F RAMIREZ MD      CT Head w/o Contrast [[id number]]  Resulted: 09/07/18 0911, Result status: Final result    Ordering provider: Xochilt Cervantes MD  09/06/18 1611 Resulted by: Jose F Ramirez MD    Performed: 09/06/18 1703 - 09/06/18 1716 Resulting lab: RADIOLOGY RESULTS    Narrative:       CT SCAN OF THE HEAD WITHOUT CONTRAST   9/6/2018 5:16 PM     HISTORY:  Weakness. Rule out stroke.     TECHNIQUE:  Axial images of the head and coronal reformations without  IV contrast material.  Radiation dose for this scan was reduced using  automated exposure control, adjustment of the mA and/or kV according  to patient size, or iterative reconstruction technique.    COMPARISON: None.    FINDINGS: There has been a previous left frontal tim hole. The  calvarium is otherwise intact. Vascular calcifications are seen at the  skull base. Mild to moderate cerebral atrophy is present. Minimal  nonspecific white matter changes are present. There is a low density  in the left corona radiata which is probably an old lacunar infarct,  although it is difficult to exclude a subacute infarct. 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 There is no evidence for  intracranial hemorrhage, mass effect, acute infarct, or skull  fracture.      Impression:       IMPRESSION: Chronic changes. No evidence for intracranial hemorrhage  or any acute process. If clinically indicated, MRI of the brain  without and with contrast might be more sensitive in evaluating for a  subtle acute infarct.    JOSE F STEPHENS MD      Testing Performed By     Lab - Abbreviation Name Director Address Valid Date Range    104 - Rad Rslts RADIOLOGY RESULTS Unknown Unknown 02/16/05 1553 - Present            Encounter-Level Documents:     There are no encounter-level documents.      Order-Level Documents:     There are no order-level documents.

## 2018-09-05 NOTE — IP AVS SNAPSHOT
[place] [address] [facility]    [address] [place] [address]    Phone:  [phone]                                       After Visit Summary   9/5/2018    Efrem Ramos    MRN: [id number]           After Visit Summary Signature Page     I have received my discharge instructions, and my questions have been answered. I have discussed any challenges I see with this plan with the nurse or doctor.    ..........................................................................................................................................  Patient/Patient Representative Signature      ..........................................................................................................................................  Patient Representative Print Name and Relationship to Patient    ..................................................               ................................................  Date                                            Time    ..........................................................................................................................................  Reviewed by Signature/Title    ...................................................              ..............................................  Date                                                            Time          22EPIC Rev 08/18

## 2018-09-05 NOTE — IP AVS SNAPSHOT
"` `           [place] [address] [facility]: [phone]                                              INTERAGENCY TRANSFER FORM - NURSING   2018                    Hospital Admission Date: 2018  PHANI AVITIA   : 1945  Sex: Male        Attending Provider: Aidan Zazueta MD     Allergies:  Adhesive Tape, Amiodarone, Lasix [Furosemide], Penicillins, Sulfa Drugs    Infection:  None   Service:  HOSPITALIST    Ht:  1.727 m (5' 8\")   Wt:  84.5 kg (186 lb 3.2 oz)   Admission Wt:  78 kg (172 lb)    BMI:  28.31 kg/m 2   BSA:  2.01 m 2            Patient PCP Information     Provider PCP Type    Danny Paige MD, MD General      Current Code Status     Date Active Code Status Order ID Comments User Context       Prior      Code Status History     Date Active Date Inactive Code Status Order ID Comments User Context    9/10/2018 10:01 AM  Full Code [id number]  Emeli Reyes MD Outpatient    2018 10:34 AM 9/10/2018 10:01 AM Full Code [id number]  Emeli Reyes MD Inpatient    2018  9:25 PM 2018  4:09 PM Full Code [id number]  Aidan Zazueta MD Inpatient    2016  9:53 AM 2018  9:25 PM Full Code [id number]  Edenilson Leal MD Outpatient    2016  6:44 PM 2016 12:28 PM Full Code [id number]  Xochilt Cervantes MD Inpatient    2016  9:40 AM 2016  4:23 PM Full Code [id number]  Yumiko Wilson APRN CNP Inpatient    2015  3:43 PM 2016  9:40 AM Full Code [id number]  Moises Soriano MD Outpatient    2015 10:26 PM 2015  3:43 PM Full Code [id number]  Tony Lozano, DO Inpatient      Advance Directives        Scanned docmt in ACP Activity?           No scanned doc        Hospital Problems as of 9/10/2018              Priority Class Noted POA    Hypokalemia Medium  2018 Yes      Non-Hospital Problems as of 9/10/2018              Priority Class Noted    Encephalopathy Medium  3/15/2005    Mixed " hyperlipidemia Medium  3/15/2005    Other specified disorder of skin Medium  3/15/2005    MEL (obstructive sleep apnea) Medium  7/22/2013    PVD (peripheral vascular disease) (H) Medium  7/22/2013    Decreased diffusion capacity Medium  7/22/2013    Anemia Medium  7/23/2013    Vocal cord cancer (H) Medium  7/31/2013    Lower extremity edema Medium  7/31/2013    Lymphocytic colitis Medium  8/18/2013    Mitral valve problem Medium  8/18/2013    IgG monoclonal gammopathy Medium  8/18/2013    CARDIOVASCULAR SCREENING; LDL GOAL LESS THAN 100 Medium  8/18/2013    Vitamin B12 deficiency disease Medium  9/10/2013    Elevated ferritin Medium  9/10/2013    Collagenous colitis Medium  9/10/2013    Redundant colon Medium  Unknown    Rash and nonspecific skin eruption Medium  12/6/2013    Cerebral infarction (H) Medium  6/9/2015    Alcohol abuse Medium  8/11/2015    Atrial fibrillation and flutter (H) Medium  12/7/2015    Health Care Home Medium  12/10/2015    Atrial fibrillation (H) Medium  6/28/2016    Hypoxia Medium  7/27/2016    Acute respiratory failure with hypoxia (H) Medium  8/10/2016    Physical deconditioning Medium  8/10/2016    Urinary retention Medium  8/10/2016    Community acquired pneumonia Medium  8/10/2016    Constipation Medium  8/10/2016    Pneumonia Medium  8/17/2016    CAD (coronary artery disease)-moderate  nonobstructive 2 vessel Medium  9/18/2017      Immunizations     Name Date      Influenza (High Dose) 3 valent vaccine 10/09/17     Influenza (High Dose) 3 valent vaccine 11/01/16     Influenza (High Dose) 3 valent vaccine 10/21/15     Influenza (High Dose) 3 valent vaccine 11/24/14     Influenza (High Dose) 3 valent vaccine 11/25/13     Influenza (IIV3) PF 12/27/00     Pneumo Conj 13-V (2010&after) 06/29/15     Pneumococcal 23 valent 11/25/13     TDAP Vaccine (Adacel) 12/06/13          END      ASSESSMENT     Discharge Profile Flowsheet     EXPECTED DISCHARGE     FINAL RESOURCES      Expected Discharge  "Date  09/10/18 09/09/18 1142   Resources List  Transitional Care 08/09/16 1102    DISCHARGE NEEDS ASSESSMENT     PAS Number  [id number] 08/09/16 1117    Equipment Currently Used at Home  cane, straight;walker, rolling;raised toilet 09/07/18 1420   SKIN      # of Referrals Placed by CTS  Post Acute Facilities 08/06/16 1556   Inspection of bony prominences  Full 09/10/18 0242    Equipment Used at Home  cane, straight 12/11/15 1250   Inspection under devices  Full 09/10/18 0242    GASTROINTESTINAL (ADULT,PEDIATRIC,OB)     Skin WDL  WDL 09/10/18 0242    GI WDL  WDL 09/10/18 0242   Skin Color/Characteristics  pale 09/10/18 0242    Abdominal Appearance  distended 09/10/18 0242   Skin Moisture  dry 09/10/18 0242    All Quadrants Bowel Sounds  audible and active in all quadrants 09/10/18 0242   Skin Integrity  scar(s) 09/09/18 1731    Last Bowel Movement  09/09/18 09/09/18 1731   Skin Temperature  warm 09/10/18 0242    GI Signs/Symptoms  no gastrointestinal signs/symptoms 09/10/18 0242   SAFETY      Passing flatus  yes 09/10/18 0242   Safety WDL  WDL 09/10/18 0242    COMMUNICATION ASSESSMENT     All Alarms  alarm(s) activated and audible 09/10/18 0242    Patient's communication style  spoken language (English or Bilingual) 09/05/18 1531                      Assessment WDL (Within Defined Limits) Definitions           Safety WDL     Effective: 09/28/15    Row Information: <b>WDL Definition:</b> Bed in low position, wheels locked; call light in reach; upper side rails up x 2; ID band on<br> <font color=\"gray\"><i>Item=AS safety wdl>>List=AS safety wdl>>Version=F14</i></font>      Skin WDL     Effective: 09/28/15    Row Information: <b>WDL Definition:</b> Warm; dry; intact; elastic; without discoloration; pressure points without redness<br> <font color=\"gray\"><i>Item=AS skin wdl>>List=AS skin wdl>>Version=F14</i></font>      Vitals     Vital Signs Flowsheet     VITAL SIGNS     Side Effects Monitoring: Respiratory Depth  N " "09/09/18 0548    Temp  97.4  F (36.3  C) 09/10/18 0745   Side Effects Monitoring: Sedation Level  1 09/09/18 0548    Temp src  Axillary 09/10/18 0743   HEIGHT AND WEIGHT      Resp  16 09/10/18 0743   Height  1.727 m (5' 8\") 09/05/18 1536    Pulse  81 09/10/18 0743   Height Method  Stated 09/05/18 1536    Heart Rate  81 09/10/18 0743   Weight  84.5 kg (186 lb 3.2 oz) 09/10/18 0635    Pulse/Heart Rate Source  Monitor 09/10/18 0743   Weight Method  Standing scale 09/10/18 0635    BP  133/90 09/10/18 0743   Bed Scale  Standard (fitted sheet, draw sheet/ pad, cover/flat sheet, blanket, two pillows) 09/06/18 0645    BP Location  Left arm 09/10/18 0743   BSA (Calculated - sq m)  1.93 09/05/18 1536    OXYGEN THERAPY     BMI (Calculated)  26.21 09/05/18 1536    SpO2  90 % 09/10/18 0743   JING COMA SCALE      O2 Device  None (Room air) 09/10/18 0743   Best Eye Response  4-->(E4) spontaneous 09/10/18 0235    PAIN/COMFORT     Best Motor Response  6-->(M6) obeys commands 09/10/18 0235    Patient Currently in Pain  yes 09/10/18 0235   Best Verbal Response  5-->(V5) oriented 09/10/18 0235    Preferred Pain Scale  number (Numeric Rating Pain Scale) 09/10/18 0235   [place] Coma Scale Score  15 09/10/18 0235    Patient's Stated Pain Goal  No pain 09/10/18 0235   POSITIONING      0-10 Pain Scale  4 09/10/18 0235   Body Position  -- 09/10/18 0957    Pain Location  Abdomen 09/10/18 0235   Head of Bed (HOB)  -- 09/10/18 0957    Pain Orientation  Lower 09/10/18 0235   Positioning/Transfer Devices  -- 09/10/18 0957    Pain Descriptors  Cramping 09/10/18 0235   Chair  Upright in chair 09/10/18 0957    Pain Management Interventions  analgesia administered 09/10/18 0235   DAILY CARE      Pain Intervention(s)  Medication (See eMAR) 09/10/18 0235   Activity Management  ambulated to bathroom 09/10/18 0953    Response to Interventions  Decrease in pain 09/07/18 2105   Activity Assistance Provided  assistance, stand-by 09/10/18 0953    " ANALGESIA SIDE EFFECTS MONITORING     Assistive Device Utilized  cane 09/10/18 0953    Side Effects Monitoring: Respiratory Quality  R 09/09/18 0548   Additional Documentation  -- 09/06/18 0127            Patient Lines/Drains/Airways Status    Active LINES/DRAINS/AIRWAYS     Name: Placement date: Placement time: Site: Days: Last dressing change:    Peripheral IV 09/09/18 Right Upper forearm 09/09/18   1338   Upper forearm   less than 1     Incision/Surgical Site 08/02/16 Right Chest 08/02/16   1617    768             Patient Lines/Drains/Airways Status    Active PICC/CVC     None            Intake/Output Detail Report     Date Intake     Output    Shift P.O. I.V. IV Piggyback Total Total       Noc 09/08/18 2300 - 09/09/18 0659 -- -- -- -- -- 0    Day 09/09/18 0700 - 09/09/18 1459 -- 1231 -- 1231 -- 1231    Sam[place] 09/09/18 1500 - 09/09/18 2259 240 -- -- 240 -- 240    Noc 09/09/18 2300 - 09/10/18 0659 -- 1628 -- 1628 -- 1628    Day 09/10/18 0700 - 09/10/18 1459 240 170 -- 410 -- 410      Last Void/BM       Most Recent Value    Urine Occurrence 1 at 09/10/2018 0953    Stool Occurrence 1 at 09/10/2018 0637      Case Management/Discharge Planning     Case Management/Discharge Planning Flowsheet     REFERRAL INFORMATION     EXPECTED DISCHARGE      Did the Initial Social Work Assessment result in a Social Work Case?  Yes 09/09/18 1519   Expected Discharge Date  09/10/18 09/09/18 1142    Arrived From  home or self-care 06/28/16 1033   DISCHARGE PLANNING      Referral Source  physician 09/09/18 1519   Equipment Used at Home  cane, straight 12/11/15 1250    # of Referrals Placed by CTS  Post Acute Facilities 08/06/16 1556   FINAL RESOURCES      Reason For Consult  discharge planning 09/09/18 1519   Equipment Currently Used at Home  cane, straight;walker, rolling;raised toilet 09/07/18 1420    Record Reviewed  medical record 09/09/18 1519   Resources List  Transitional Care 08/09/16 1102    LIVING ENVIRONMENT     PAS Number   [id number] 08/09/16 1117    Lives With  spouse 09/09/18 1519   ABUSE RISK SCREEN      Living Arrangements  house 09/09/18 1519   QUESTION TO PATIENT:  Has a member of your family or a partner(now or in the past) intimidated, hurt, manipulated, or controlled you in any way?  no 09/05/18 1539    Quality Of Family Relationships  supportive;involved 09/09/18 1519   QUESTION TO PATIENT: Do you feel safe going back to the place where you are living?  yes 09/05/18 1539    ASSESSMENT OF FAMILY/SOCIAL SUPPORT     OBSERVATION: Is there reason to believe there has been maltreatment of a vulnerable adult (ie. Physical/Sexual/Emotional abuse, self neglect, lack of adequate food, shelter, medical care, or financial exploitation)?  no 09/05/18 1539    Marital Status   09/09/18 1519   OTHER      Who is your support system?  Wife 09/09/18 1519   Are you depressed or being treated for depression?  No 09/05/18 2202    COPING/STRESS     HOMICIDE RISK      Major Change/Loss/Stressor  hospitalization 09/05/18 2202   Feels Like Hurting Others  no 09/05/18 1539    Patient Personal Strengths  humor;positive attitude;strong support system 08/06/16 1554

## 2018-09-05 NOTE — IP AVS SNAPSHOT
"[place] [address] [facility]: [phone]                                              INTERAGENCY TRANSFER FORM - PHYSICIAN ORDERS   2018                    Hospital Admission Date: 2018  PHANI AVITIA   : 1945  Sex: Male        Attending Provider: Aidan Zazueta MD     Allergies:  Adhesive Tape, Amiodarone, Lasix [Furosemide], Penicillins, Sulfa Drugs    Infection:  None   Service:  HOSPITALIST    Ht:  1.727 m (5' 8\")   Wt:  84.5 kg (186 lb 3.2 oz)   Admission Wt:  78 kg (172 lb)    BMI:  28.31 kg/m 2   BSA:  2.01 m 2            Patient PCP Information     Provider PCP Type    Danny Paige MD, MD General      ED Clinical Impression     Diagnosis Description Comment Added By Time Added    Acute renal failure, unspecified acute renal failure type (H) [N17.9] Acute renal failure, unspecified acute renal failure type (H) [N17.9]  Yosvany Salomon MD 2018  8:06 PM    Hypokalemia [E87.6] Hypokalemia [E87.6]  Yosvany Salomon MD 2018  8:06 PM    Diarrhea, unspecified type [R19.7] Diarrhea, unspecified type [R19.7]  Yosvany Salomon MD 2018  8:06 PM    Dehydration [E86.0] Dehydration [E86.0]  Yosvany Salomon MD 2018  8:07 PM    Leukocytosis, unspecified type [D72.829] Leukocytosis, unspecified type [D72.829]  Yosvany Salomon MD 2018  8:07 PM      Hospital Problems as of 9/10/2018              Priority Class Noted POA    Hypokalemia Medium  2018 Yes      Non-Hospital Problems as of 9/10/2018              Priority Class Noted    Encephalopathy Medium  3/15/2005    Mixed hyperlipidemia Medium  3/15/2005    Other specified disorder of skin Medium  3/15/2005    MEL (obstructive sleep apnea) Medium  2013    PVD (peripheral vascular disease) (H) Medium  2013    Decreased diffusion capacity Medium  2013    Anemia Medium  2013    Vocal cord cancer (H) Medium  2013    Lower extremity edema Medium  " 7/31/2013    Lymphocytic colitis Medium  8/18/2013    Mitral valve problem Medium  8/18/2013    IgG monoclonal gammopathy Medium  8/18/2013    CARDIOVASCULAR SCREENING; LDL GOAL LESS THAN 100 Medium  8/18/2013    Vitamin B12 deficiency disease Medium  9/10/2013    Elevated ferritin Medium  9/10/2013    Collagenous colitis Medium  9/10/2013    Redundant colon Medium  Unknown    Rash and nonspecific skin eruption Medium  12/6/2013    Cerebral infarction (H) Medium  6/9/2015    Alcohol abuse Medium  8/11/2015    Atrial fibrillation and flutter (H) Medium  12/7/2015    Health Care Home Medium  12/10/2015    Atrial fibrillation (H) Medium  6/28/2016    Hypoxia Medium  7/27/2016    Acute respiratory failure with hypoxia (H) Medium  8/10/2016    Physical deconditioning Medium  8/10/2016    Urinary retention Medium  8/10/2016    Community acquired pneumonia Medium  8/10/2016    Constipation Medium  8/10/2016    Pneumonia Medium  8/17/2016    CAD (coronary artery disease)-moderate  nonobstructive 2 vessel Medium  9/18/2017      Code Status History     Date Active Date Inactive Code Status Order ID Comments User Context    9/10/2018 10:01 AM  Full Code [id number]  Emeli Reyes MD Outpatient    9/9/2018 10:34 AM 9/10/2018 10:01 AM Full Code [id number]  Emeli Reyes MD Inpatient    9/5/2018  9:25 PM 9/6/2018  4:09 PM Full Code [id number]  Aidan Zazueta MD Inpatient    8/9/2016  9:53 AM 9/5/2018  9:25 PM Full Code [id number]  Edenilson Leal MD Outpatient    7/27/2016  6:44 PM 8/5/2016 12:28 PM Full Code [id number]  Xochilt Cervantes MD Inpatient    6/28/2016  9:40 AM 6/28/2016  4:23 PM Full Code [id number]  Yumiko Wilson APRN CNP Inpatient    12/8/2015  3:43 PM 6/28/2016  9:40 AM Full Code [id number]  Moises Soriano MD Outpatient    12/7/2015 10:26 PM 12/8/2015  3:43 PM Full Code [id number]  Tony Lozano, DO Inpatient         Medication Review      START taking        Dose / Directions  Comments    budesonide 9 MG 24 hr tablet   Commonly known as:  UCERIS   Used for:  Collagenous colitis        Dose:  9 mg   Start taking on:  9/11/2018   Take 1 tablet (9 mg) by mouth daily   Quantity:  30 tablet   Refills:  1        loperamide 2 MG capsule   Commonly known as:  IMODIUM   Used for:  Diarrhea, unspecified type        Dose:  4 mg   Take 2 capsules (4 mg) by mouth 4 times daily as needed for diarrhea   Quantity:  20 capsule   Refills:  0    ho       simethicone 80 MG chewable tablet   Commonly known as:  MYLICON   Used for:  Flatulence, eructation and gas pain        Dose:  80 mg   Take 1 tablet (80 mg) by mouth every 6 hours as needed for cramping   Quantity:  180 tablet   Refills:  0          CONTINUE these medications which may have CHANGED, or have new prescriptions. If we are uncertain of the size of tablets/capsules you have at home, strength may be listed as something that might have changed.        Dose / Directions Comments    metoprolol succinate 25 MG 24 hr tablet   Commonly known as:  TOPROL-XL   This may have changed:  when to take this   Used for:  Nonrheumatic aortic valve stenosis        Dose:  12.5 mg   Take 0.5 tablets (12.5 mg) by mouth daily   Quantity:  45 tablet   Refills:  2          CONTINUE these medications which have NOT CHANGED        Dose / Directions Comments    apixaban ANTICOAGULANT 5 MG tablet   Commonly known as:  ELIQUIS   Used for:  Atrial fibrillation and flutter (H)        Dose:  5 mg   Take 1 tablet (5 mg) by mouth 2 times daily   Quantity:  180 tablet   Refills:  2        ASPIRIN PO        Dose:  81 mg   Take 81 mg by mouth every evening   Refills:  0        Cyanocobalamin 2000 MCG Tabs        Dose:  2000 mcg   Take 2,000 mcg by mouth every 7 days On Sundays   Refills:  0        gabapentin 300 MG capsule   Commonly known as:  NEURONTIN   Used for:  Restless legs syndrome (RLS)        Dose:  600 mg   Take 2 capsules (600 mg) by mouth every evening   Quantity:  60  capsule   Refills:  5        multivitamin Tabs tablet        Dose:  1 tablet   Take 1 tablet by mouth 2 times daily   Refills:  0          STOP taking     acetaminophen 500 MG tablet   Commonly known as:  TYLENOL           atorvastatin 40 MG tablet   Commonly known as:  LIPITOR                   Summary of Visit     Reason for your hospital stay       Diarrhea, low potassium , dysphagia, elevated liver function test             After Care     Activity - Up with assistive device           Activity - Up with nursing assistance           Advance Diet as Tolerated       Follow this diet upon discharge: Orders Placed This Encounter      Room Service      Combination Diet Dysphagia Diet Level 2: Mechan Altered; Thin Liquids (water, ice chips, juice, milk gelatin, ice cream, etc)  ASpiration precaution       Daily weights       Call Provider for weight gain of more than 2 pounds per day or 5 pounds per week.       Fall precautions           General info for SNF       Length of Stay Estimate: Short Term Care: Estimated # of Days <30  Condition at Discharge: Improving  Level of care:skilled   Rehabilitation Potential: Good  Admission H&P remains valid and up-to-date: Yes  Recent Chemotherapy: N/A  Use Nursing Home Standing Orders: Yes       Intake and output       Every shift       Mantoux instructions       Give two-step Mantoux (PPD) Per Facility Policy Yes             Referrals     Occupational Therapy Adult Consult       Evaluate and treat as clinically indicated.    Reason:  Deconditioning       Physical Therapy Adult Consult       Evaluate and treat as clinically indicated.    Reason:  Deconditioning       Speech Language Path Adult Consult       Evaluate and treat as clinically indicated.    Reason:  Dysphagia             Supplies     Pneumatic Compression Device        Bilateral calf. Remove 30 mins BID.             Your next 10 appointments already scheduled     Sep 19, 2018  1:15 PM CDT   Return Visit with Tram  Oncology Nurse   [facility] ([facility])    [facility] [place]  [address]  [place] [address]   [phone]            Sep 26, 2018  1:00 PM CDT   Return Visit with Shae Aldana MD   [facility] ([facility])    [facility] [place]  [address]  [place] [address]   [phone]            Aug 14, 2019  1:30 PM CDT   Return Sleep Patient with Ron Pacheco MD   [facility] [place] [facility]    [address]  [facility] [address]   [phone]              Follow-Up Appointment Instructions     Future Labs/Procedures    Follow Up and recommended labs and tests     Comments:    Follow up with senior living physician.  The following labs/tests are recommended: CBC/CMP.  Follow-up with GI in 2-3 weeks ([facility])      Follow-Up Appointment Instructions     Follow Up and recommended labs and tests       Follow up with senior living physician.  The following labs/tests are recommended: CBC/CMP.  Follow-up with GI in 2-3 weeks ([facility])             Statement of Approval     Ordered          09/10/18 1135  I have reviewed and agree with all the recommendations and orders detailed in this document.  EFFECTIVE NOW     Approved and electronically signed by:  Emeli Reyes MD

## 2018-09-05 NOTE — ED NOTES
DATE:  9/5/2018   TIME OF RECEIPT FROM LAB:  2364  LAB TEST:  Potassium  LAB VALUE:  2.3  RESULTS GIVEN WITH READ-BACK TO (PROVIDER):  Yosvany Salomon MD  TIME LAB VALUE REPORTED TO PROVIDER:   [id number]

## 2018-09-05 NOTE — PROGRESS NOTES
SUBJECTIVE:   Efrem Ramos is a 73 year old male presenting with a chief complaint of worsening weakness in the past 2 weeks.  Difficulty swallowing.  Fatigue.  Denies any specific urinary symptoms or abdominal pain.  Does have a h/o colitis and has frequent loose stools.  Denies any blood in stools.      Symptoms worse in the past couple of days    He states that he has been in bed for the past few days, unable to do much.      Denies any chest pain.      Feels very winded with walking only a few steps      SH: he is here with his wife Mariajose.   They have been  almost 50 years, their anniversary is on October 18.      Past Medical History:   Diagnosis Date     Atrial fibrillation (H)      Cancer (H) vocal cord     Carpal tunnel syndrome     abstracted 7/3/02.     CVA (cerebral infarction) 5/5/2015     Demyelinating disease of central nervous system, unspecified     abstracted 7/3/02.     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     Pneumonia 8/17/2016     Redundant colon     needs CT colonography     S/P coronary angiogram 09/05/2017     Shingles      SKIN DISORDERS NEC 3/15/2005     Sleep apnea      Patient Active Problem List   Diagnosis     Encephalopathy     Mixed hyperlipidemia     Other specified disorder of skin     MEL (obstructive sleep apnea)     PVD (peripheral vascular disease) (H)     Decreased diffusion capacity     Anemia     Vocal cord cancer (H)     Lower extremity edema     Lymphocytic colitis     Mitral valve problem     IgG monoclonal gammopathy     CARDIOVASCULAR SCREENING; LDL GOAL LESS THAN 100     Vitamin B12 deficiency disease     Elevated ferritin     Collagenous colitis     Redundant colon     Rash and nonspecific skin eruption     Cerebral infarction (H)     Alcohol abuse     Atrial fibrillation and  flutter (H)     Health Care Home     Atrial fibrillation (H)     Hypoxia     Acute respiratory failure with hypoxia (H)     Physical deconditioning     Urinary retention     Community acquired pneumonia     Constipation     Pneumonia     CAD (coronary artery disease)-moderate  nonobstructive 2 vessel     Social History   Substance Use Topics     Smoking status: Former Smoker     Packs/day: 1.00     Years: 30.00     Types: Cigarettes     Quit date: 7/26/2013     Smokeless tobacco: Never Used     Alcohol use 25.2 oz/week     42 Standard drinks or equivalent per week      Comment: 2 beers per day       ROS:  CONSTITUTIONAL:as per HPI  INTEGUMENTARY/SKIN: NEGATIVE for worrisome rashes, moles or lesions  ENT/MOUTH: NEGATIVE for ear, mouth and throat problems  RESP:as per HPI  CV: NEGATIVE for chest pain, palpitations or peripheral edema  GI: as per HPI  : negative for dysuria, hematuria, decreased urinary stream, erectile dysfunction    OBJECTIVE  :BP 92/61  Pulse 65  Temp 97.8  F (36.6  C) (Oral)  Wt 172 lb (78 kg)  SpO2 97%  BMI 25.77 kg/m2  GENERAL APPEARANCE: pale, weak, unable to walk more than a few steps without feeling winded.    SKIN: warm and dry    (R53.1) Weakness generalized  (primary encounter diagnosis)  Comment:   Plan: to ED now for further evaluation.  His wife Mariajose will drive him.

## 2018-09-05 NOTE — IP AVS SNAPSHOT
` `     [place] [address] [facility]: [phone]                 INTERAGENCY TRANSFER FORM - NOTES (H&P, Discharge Summary, Consults, Procedures, Therapies)   2018                    Hospital Admission Date: 2018  PHANI RAMOS   : 1945  Sex: Male        Patient PCP Information     Provider PCP Type    Danny Paige MD, MD General         History & Physicals      H&P by Aidan Zazueta MD at 2018  8:57 PM     Author:  Aidan Zazueta MD Service:  Hospitalist Author Type:  Physician    Filed:  2018 10:23 PM Date of Service:  2018  8:57 PM Creation Time:  2018  8:57 PM    Status:  Signed :  Aidan Zazueta MD (Physician)         [facility]    History and Physical  Hospitalist       Date of Admission:  2018    Assessment & Plan    Phani Ramos is a 73 year old male[CA1.1] with multiple medical problems 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  He has MGUS which is being followed by his oncologist.  His atrial fibrillation and history of a stroke.  He is anticoagulated with apixaban.  The patient says that he has had colitis throughout his life and he was originally told he had ulcerative colitis.  However later in life he was told that it was not ulcerative colitis but he cannot recall the diagnosis.  He says he has 4-5 loose stools per day on an ongoing basis.  He does not have blood in his stool.  Over the past 2 weeks he has had much more frequent bowel movements.  He is also had some abdominal distention and crampy pain with this.  He has not seen any blood in his stool.  He also says that he has had dysphagia to solid food for a couple of weeks.  He has been taking liquids only.  He has been having trouble swallowing his pills.  He has not had any fever, chills, night sweats, cold, cough, chest pain, shortness of breath.  He has not had any vomiting.  In  year  colonoscopy which showed a polyp but was otherwise unremarkable.  He says that a colonoscopy was attempted in 2015 but was aborted due to a redundant colon.  He says that he was told he could never have another colonoscopy.  I do not have that report available.  He says that he subsequently had a virtual colonoscopy.  He was reluctant to come to the hospital but his wife finally insisted that he come in this evening.  His blood pressure was 91/51 temperature 97.7 oxygen saturation 97% weight 78 kg.  He had a benign abdominal exam.  EKG showed atrial fibrillation.  CT of the abdomen was unremarkable.  Right upper quadrant ultrasound was unremarkable.  Chest x-ray showed an elevated left hemidiaphragm.  There was no infiltrate or effusion.  Labs are notable for potassium 2.3 creatinine 1.66 magnesium 2.1 AST 97  18 CRP 37 lactic acid 1.5 lipase 294 troponin 0 0.027 white blood cell count 19.9 hemoglobin 14.4 platelets 366,000 urinalysis showed 3 white cells less than 1 red cell and a small amount of protein.  He was treated with IV fluids and is being made to the hospital.[CA1.2]    Diarrhea[CA1.1], history of colitis  The patient normally has 4-5 nonbloody stools per day but is having increased frequency and abdominal cramping for 2 weeks  It is unclear what kind of colitis the patient was told he had.  He appears to have had a normal colonoscopy except for a polyp 13 years ago.  In 2015 and attempted colonoscopy was aborted and he was told he could never have another one because he had a redundant colon.  Stool studies were ordered, and we will ask GI to see him tomorrow[CA1.2]    Dysphagia to solid food[CA1.1]   Speech therapy and GI consultations[CA1.2]    Hypokalemia[CA1.1]   Replace per protocol    Acute kidney injury   Probably prerenal  Continue with IV fluid and recheck creatinine tomorrow    Leukocytosis  Daily due to stress and possibly dehydration.  CRP is 37.  Repeat tomorrow.[CA1.2]    Elevated  liver transaminases[CA1.1]    Total bilirubin is 1.2, ALT 97, , alkaline phosphatase 313 and lipase 294  Etiology unclear but may be due to his statin  Upper quadrant ultrasound negative  Repeat tomorrow morning    Chronic atrial fibrillation and hypertension  Continue metoprolol and apixaban    Hypercholesterolemia on statin therapy  Liver transaminases are elevated.  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  He has MGUS which is being followed by his oncologist.  His atrial fibrillation and history of a stroke.  He is anticoagulated with apixaban.  The patient says that he has had colitis throughout his life and he was originally told he had ulcerative colitis.  However later in life he was told that it was not ulcerative colitis but he cannot recall the diagnosis.  He says he has 4-5 loose stools per day on an ongoing basis.  He does not have blood in his stool.  Over the past 2 weeks he has had much more frequent bowel movements.  He is also had some abdominal distention and crampy pain with this.  He has not seen any blood in his stool.  He also says that he has had dysphagia to solid food for a couple of weeks.  He has been taking liquids only.  He has been having trouble swallowing his pills.  He has not had any fever, chills, night sweats, cold, cough, chest pain, shortness of breath.  He has not had any vomiting.  In 2005 year colonoscopy which showed a polyp but was otherwise unremarkable.  He says that a colonoscopy was  attempted in 2015 but was aborted due to a redundant colon.  He says that he was told he could never have another colonoscopy.  I do not have that report available.  He says that he subsequently had a virtual colonoscopy.  He was reluctant to come to the hospital but his wife finally insisted that he come in this evening.  His blood pressure was 91/51 temperature 97.7 oxygen saturation 97% weight 78 kg.  He had a benign abdominal exam.  EKG showed atrial fibrillation.  CT of the abdomen was unremarkable.  Right upper quadrant ultrasound was unremarkable.  Chest x-ray showed an elevated left hemidiaphragm.  There was no infiltrate or effusion.  Labs are notable for potassium 2.3 creatinine 1.66 magnesium 2.1 AST 97  18 CRP 37 lactic acid 1.5 lipase 294 troponin 0 0.027 white blood cell count 19.9 hemoglobin 14.4 platelets 366,000 urinalysis showed 3 white cells less than 1 red cell and a small amount of protein.  He was treated with IV fluids and is being made to the hospital.[CA1.2]        Past Medical History    I have reviewed this patient's medical history and updated it with pertinent information if needed.[CA1.1]   Past Medical History:   Diagnosis Date     Atrial fibrillation (H)      Cancer (H) vocal cord     Carpal tunnel syndrome     abstracted 7/3/02.     CVA (cerebral infarction) 5/5/2015     Demyelinating disease of central nervous system, unspecified     abstracted 7/3/02.     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     Pneumonia 8/17/2016     Redundant colon     needs CT colonography     S/P coronary angiogram 09/05/2017     Shingles      SKIN DISORDERS NEC 3/15/2005     Sleep apnea[CA1.3]        Past Surgical History   I have reviewed this patient's surgical history and updated it with pertinent information if  needed.[CA1.1]  Past Surgical History:   Procedure Laterality Date     BIOPSY  brain 2002     BONE MARROW BIOPSY, BONE SPECIMEN, NEEDLE/TROCAR N/A 6/8/2017    Procedure: BIOPSY BONE MARROW;  UNILATERAL BONE MARROW BIOPSY (CONSCIOUS SEDATION) ;  Surgeon: Jamie Gonzales MD;  Location:  GI     C NONSPECIFIC PROCEDURE  as a child    T & A. abstracted 7/3/02.     C NONSPECIFIC PROCEDURE  early    CTR. abstracted 7/3/02.     HEAD & NECK SURGERY       ORTHOPEDIC SURGERY      right arm ulna reset after injury     THORACOSCOPIC WEDGE RESECTION LUNG Right 8/2/2016    Procedure: THORACOSCOPIC WEDGE RESECTION LUNG;  Surgeon: Abdelrahman Noriega MD;  Location:  OR[CA1.3]       Prior to Admission Medications   Prior to Admission Medications   Prescriptions Last Dose Informant Patient Reported? Taking?   ASPIRIN PO 9/4/2018 at pm Self Yes Yes   Sig: Take 81 mg by mouth every evening    Cyanocobalamin 2000 MCG TABS 9/2/2018 at pm Self Yes No   Sig: Take 2,000 mcg by mouth every 7 days On Sundays   acetaminophen (TYLENOL) 500 MG tablet More than a month at prn Self Yes No   Sig: Take 1,000 mg by mouth every 6 hours as needed for mild pain   apixaban ANTICOAGULANT (ELIQUIS) 5 MG tablet 9/5/2018 at am Self No Yes   Sig: Take 1 tablet (5 mg) by mouth 2 times daily   atorvastatin (LIPITOR) 40 MG tablet 9/4/2018 at pm Self No Yes   Sig: Take 1 tablet (40 mg) by mouth daily   Patient taking differently: Take 40 mg by mouth every evening    gabapentin (NEURONTIN) 300 MG capsule 9/4/2018 at pm Self No Yes   Sig: Take 2 capsules (600 mg) by mouth every evening   metoprolol succinate (TOPROL-XL) 25 MG 24 hr tablet 9/4/2018 at pm Self No Yes   Sig: Take 0.5 tablets (12.5 mg) by mouth daily   Patient taking differently: Take 12.5 mg by mouth every evening    multivitamin (OCUVITE) TABS 9/5/2018 at am Self Yes Yes   Sig: Take 1 tablet by mouth 2 times daily       Facility-Administered Medications: None     Allergies   Allergies    Allergen Reactions     Adhesive Tape Blisters     Amiodarone      Lasix [Furosemide] Other (See Comments)     Throat swelling, wheezing     Penicillins Unknown     Sulfa Drugs Difficulty breathing       Social History   I have reviewed this patient's social history and updated it with pertinent information if needed. Efrem Ramos[CA1.1]  reports that he quit smoking about 5 years ago. His smoking use included Cigarettes. He has a 30.00 pack-year smoking history. He has never used smokeless tobacco. He reports that he drinks about 25.2 oz of alcohol per week  He reports that he does not use illicit drugs.[CA1.3]    Family History   I have reviewed this patient's family history and updated it with pertinent information if needed.[CA1.1]   Family History   Problem Relation Age of Onset     Blood Disease Mother      Anemia     Cardiovascular Father      Cancer - colorectal Maternal Grandfather      Hypertension Brother      Diabetes Sister 5     Juvinile Diabetes passed at 36     Respiratory Other      Lung Cancer[CA1.3]       Review of Systems   The 10 point Review of Systems is negative other than noted in the HPI or here.     Physical Exam   Temp: 97.7  F (36.5  C) Temp src: Oral BP: 90/66   Heart Rate: 77 Resp: 10 SpO2: 100 % O2 Device: None (Room air)    Vital Signs with Ranges  Temp:  [97.7  F (36.5  C)-97.8  F (36.6  C)] 97.7  F (36.5  C)  Pulse:  [65] 65  Heart Rate:  [55-77] 77  Resp:  [10-16] 10  BP: ()/(43-87) 90/66  SpO2:  [97 %-100 %] 100 %  172 lbs 0 oz    Constitutional: Awake, alert, cooperative, no apparent distress.  Eyes: Conjunctiva and pupils examined and normal.  HEENT: Moist mucous membranes, normal dentition.  Respiratory: Clear to auscultation bilaterally, no crackles or wheezing.  Cardiovascular: Regular rate and rhythm, normal S1 and S2, and no murmur noted.  GI: Soft, non-distended, non-tender, normal bowel sounds.  Lymph/Hematologic: No anterior cervical or supraclavicular  adenopathy.  Skin: No rashes, no cyanosis, no edema.  Musculoskeletal: No joint swelling, erythema or tenderness.  Neurologic: Cranial nerves 2-12 intact, normal strength and sensation.  Psychiatric: Alert, oriented to person, place and time, no obvious anxiety or depression.    Data   Data reviewed today:  I personally reviewed no images or EKG's today.    Recent Labs  Lab 09/05/18  1628   WBC 19.9*   HGB 14.4   MCV 92         POTASSIUM 2.3*   CHLORIDE 104   CO2 23   BUN 38*   CR 1.66*   ANIONGAP 11   ULISSES 8.5   GLC 93   ALBUMIN 3.2*   PROTTOTAL 8.5   BILITOTAL 1.2   ALKPHOS 313*   ALT 97*   *   LIPASE 294   TROPI 0.027       Recent Results (from the past 24 hour(s))   XR Chest 2 Views    Narrative    CHEST TWO VIEWS  9/5/2018 5:11 PM     HISTORY: Weakness, cough.     COMPARISON: 8/7/2016      Impression    IMPRESSION: Elevated left diaphragm. Interval clearing of interstitial  infiltrates noted on the previous exam. No acute infiltrates or  pleural effusion. Mitral annulus calcification.    RAJI THAKUR MD   US Abdomen Limited    Narrative    US ABDOMEN LIMITED  9/5/2018 6:38 PM      HISTORY: RUQ pain.      COMPARISON: None.    FINDINGS: The liver is normal in size and texture without focal mass.  There is no intra or extrahepatic biliary dilatation. The common bile  duct measures 0.5 cm.  The gallbladder is normal in appearance without  gallstones.  The pancreas head and body appear normal. The tail is  obscured by bowel gas.  The right kidney measures 9.4 cm and is normal  in appearance.      Impression    IMPRESSION: Normal right upper quadrant. No gallstone or biliary  dilatation.   Abd/pelvis CT no contrast - Stone Protocol    Narrative    CT ABDOMEN AND PELVIS WITHOUT CONTRAST   9/5/2018 7:57 PM     HISTORY: Abdominal pain, leukocytosis.     TECHNIQUE: Noncontrast CT abdomen and pelvis was performed. Radiation  dose for this scan was reduced using automated exposure control,  adjustment  of the mA and/or kV according to patient size, or iterative  reconstruction technique.    COMPARISON: None.    FINDINGS:  Abdomen: There is mild atelectasis and scarring at the lung bases.  Coronary artery atherosclerotic calcifications. Mitral valve  calcifications. Evaluation of the solid abdominal organs is limited by  the lack of intravenous contrast. The liver, spleen, gallbladder,  pancreas, adrenal glands and kidneys are normal in appearance. There  is atherosclerotic calcification of aorta and its branches. No  aneurysm. There is no abdominal or pelvic lymph node enlargement.    Pelvis: Small and large bowel are filled with gas and fluid to the  rectum. No bowel obstruction. No evidence of inflammation. There is no  free intraperitoneal gas or fluid. There is a small posterior right  urinary bladder diverticulum. The prostate gland is mildly enlarged.      Impression    IMPRESSION: No acute abnormality. No bowel obstruction or  inflammation.[CA1.1]        Revision History        User Key Date/Time User Provider Type Action    > CA1.2 9/5/2018 10:23 PM Aidan Zazueta MD Physician Sign     CA1.3 9/5/2018  8:58 PM Aidan Zazueta MD Physician      CA1.1 9/5/2018  8:57 PM Aidan Zazueta MD Physician                      Discharge Summaries      Discharge Summaries by Emeli Reyes MD at 9/10/2018 10:02 AM     Author:  Emeli Reyes MD Service:  Hospitalist Author Type:  Physician    Filed:  9/10/2018 11:47 AM Date of Service:  9/10/2018 10:02 AM Creation Time:  9/10/2018 10:02 AM    Status:  Signed :  Emeli Reyes MD (Physician)         [facility]    Discharge Summary  Hospitalist    Date of Admission:  9/5/2018  Date of Discharge:[PK1.1]  9/10/2018[PK1.2]  Discharging Provider:[PK1.1] Emeli Reyes[PK1.3], MD[PK1.1]    Discharge Diagnoses[PK1.3]   Acute exacerbation of chronic diarrhea  History of collagenous colitis  Hypokalemia related to above  Acute kidney  injury related to above  Hypernatremia due to dehydration, resolved  Transaminitis  Leukocytosis  Dysphagia  History of vocal cord cancer  History of chronic atrial fibrillation on chronic anticoagulation  Hypertension  History of MGUS  Hypercholesterolemia  History of disseminated encephalomyelitis  Physical deconditioning[PK1.2]    History of Present Illness[PK1.3]   Efrem Ramos is a 73 year old male with PMH of Afib- on Eliquis, CVA, HLP, MGUS, 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  Please see admission H&P for details[PK1.2]    Hospital Course[PK1.3]   Efrem Ramos was admitted on 9/5/2018.  The following problems were addressed during his hospitalization:[PK1.1]    Diarrhea  - h/o chronic diarrhea related to collagenous colitis  - last colonoscopy in 8/2013 was incomplete due to a redundant colon.  Random biopsies were obtained for diarrhea and he was found to have collagenous colitis; he had  a virtual colonoscopy after his last colonoscopy in 9/2013 which was normal except for scattered sigmoid diverticulosis- as per notes  - no associated fever, no abd pain, no blood in stool, CT abdomen and US abdomen- no acute pathology,C diff negative; stool sample for enteric bacteria and viruses- negative  -Started on budesonide daily with the plan to continue for 8 weeks and scheduled Imodium QID, diarrhea improved prior to discharge and was advised to follow-up with [facility] as an outpatient.      Dysphagia to solid food   Voice change  - reported difficulty swallowing solids and pills and change of voice for the last 2 weeks  - has h/o vocal cord cancer and is believed to be in remission  - SLP followed patient while in-house and had a video swallow evaluation which showed some silent aspiration with thin liquids.  See below for details.  Speech recommended  DD2 with thin liquids,  aspiration precaution, he needs to follow-up with speech therapy outpatient  -s/p  EGD 9/8/18-.Minimal schatzki's ring :  Biopsied, pathology pending at the time of discharge.  There is also possibility of tertiary esophageal contractions ?esophageal spasm.  May need esophageal manometry as an outpatient      Hypokalemia   - likely due to diarrhea, replaced prior to discharge      Acute kidney injury   - Probably prerenal due to diarrhea, improved with hydration      Hypernatremia  - Na 147, improved with hydration      Elevated liver transaminases    -Patient did have  elevated LFTs which remained stable.  He did have intermittent right upper quadrant abdominal pain which would not persist  - Upper quadrant ultrasound negative, viral serology and workup for elevated LFTs pending at the time of discharge, needs outpatient follow-up with GI/liver specialist  -Continue to hold PTA statin at the time of discharge      Leukocytosis  - cause?- stress?, dehydration?others, improved today, patient has been afebrile  - UA negative, CXR- no acute infiltrates, CT abd- no acute pathology  - c diff and stool cultures negative  - continue to monitor intermittently      Lethargy/weakness  - as per son- pt is more lethargic in the last few weeks- possible related to dehydration/diarrhea and electrolyte imbalance and physical deconditioning  -Head CT negative, patient being discharged to TCU for rehabilitation      Chronic atrial fibrillation  Hypertension  - rate controlled on PTA Toprol XL 12.5 mg po qdaily, continued at the time of discharge  - continue PTA apixaban      Hypercholesterolemia   - hold PTA Lipitor for now given elevated LFTs      MGUS which is being monitored by his oncologist, follow-up with his primary oncologist as previous schedule, no new acute issues while in-house[PK1.2]  # Discharge Pain Plan:[PK1.1]   - Patient currently has NO PAIN and is not being prescribed pain medications on  discharge.[PK1.2]    Active Problems:    Hypokalemia[PK1.3]      Emeli Reyes MD[PK1.1]    Significant Results and Procedures[PK1.3]   See below[PK1.2]    Pending Results[PK1.3]   These results will be followed up by[PK1.1] GI specialist/PCP[PK1.2]  Unresulted Labs Ordered in the Past 30 Days of this Admission     Date and Time Order Name Status Description    9/10/2018 0750 Magnesium In process     9/9/2018 1001 Hepatitis C antibody In process     9/9/2018 1001 Hepatitis B surface antigen In process     9/9/2018 1001 Hepatitis A antibody IgM In process     9/8/2018 2342 Mitochondrial M2 Antibody IgG In process     9/8/2018 2342 Protein electrophoresis In process     9/8/2018 2342 F Actin EIA with reflex In process     9/8/2018 2342 Anti Nuclear Nimisha IgG by IFA with Reflex In process     9/8/2018 1157 Surgical pathology exam In process     9/8/2018 0902 Hepatitis C antibody In process     9/8/2018 0902 Hepatitis B surface antigen In process     9/8/2018 0902 Hepatitis A antibody IgM In process     9/6/2018 0944 Tissue transglutaminase nimisha IgA and IgG In process     9/5/2018 1933 Blood culture Preliminary     9/5/2018 1933 Blood culture Preliminary           Code Status[PK1.3]   Full Code[PK1.2]       Primary Care Physician   Danny Paige MD    Physical Exam   Temp: 97.4  F (36.3  C) Temp src: Axillary BP: 133/90 Pulse: 81 Heart Rate: 81 Resp: 16 SpO2: 90 % O2 Device: None (Room air)    Vitals:    09/08/18 0630 09/09/18 0500 09/10/18 0634   Weight: 81.4 kg (179 lb 6.4 oz) 81.4 kg (179 lb 7.3 oz) 84.5 kg (186 lb 3.2 oz)[PK1.3]     Vital Signs with Ranges[PK1.1]  Temp:  [97.4  F (36.3  C)] 97.4  F (36.3  C)  Pulse:  [81] 81  Heart Rate:  [64-81] 81  Resp:  [16] 16  BP: (104-133)/(67-90) 133/90  SpO2:  [90 %-95 %] 90 %  I/O last 3 completed shifts:  In: 3099 [P.O.:240; I.V.:2859]  Out: -[PK1.3]     Exam:  Constitutional: Awake, alert and no distress. Appears comfortable  Head: Normocephalic. No masses,  lesions, tenderness or abnormalities  ENT: ENT exam normal, no neck nodes or sinus tenderness  Cardiovascular: RRR.  No murmurs, no rubs or JVD  Respiratory: Normal WOB,b/l equal air entry, no wheezes or crackles   Gastrointestinal: Abdomen soft, non-tender. BS normal. No masses, organomegaly  : Deferred   extremities : No edema , no clubbing or cyanosis[PK1.2]      Discharge Disposition[PK1.3]   Discharged to short-term care facility[PK1.2]  Condition at discharge:[PK1.1] Stable[PK1.2]    Consultations This Hospital Stay   GASTROENTEROLOGY IP CONSULT  SWALLOW EVAL SPEECH PATH AT BEDSIDE IP CONSULT  PHYSICAL THERAPY ADULT IP CONSULT  SOCIAL WORK IP CONSULT  PHYSICAL THERAPY ADULT IP CONSULT  OCCUPATIONAL THERAPY ADULT IP CONSULT  SPEECH LANGUAGE PATH ADULT IP CONSULT    Time Spent on this Encounter[PK1.3]   Emeli GREEN, personally saw the patient today and spent greater than 30 minutes discharging this patient.[PK1.2]    Discharge Orders     General info for SNF   Length of Stay Estimate: Short Term Care: Estimated # of Days <30  Condition at Discharge: Improving  Level of care:skilled   Rehabilitation Potential: Good  Admission H&P remains valid and up-to-date: Yes  Recent Chemotherapy: N/A  Use Nursing Home Standing Orders: Yes     Mantoux instructions   Give two-step Mantoux (PPD) Per Facility Policy Yes     Reason for your hospital stay   Diarrhea, low potassium , dysphagia, elevated liver function test     Intake and output   Every shift     Daily weights   Call Provider for weight gain of more than 2 pounds per day or 5 pounds per week.     Follow Up and recommended labs and tests   Follow up with custodial physician.  The following labs/tests are recommended: CBC/CMP.  Follow-up with GI in 2-3 weeks ([facility])     Activity - Up with assistive device     Activity - Up with nursing assistance     Full Code     Physical Therapy Adult Consult   Evaluate and treat as clinically indicated.    Reason:   Deconditioning     Occupational Therapy Adult Consult   Evaluate and treat as clinically indicated.    Reason:  Deconditioning     Speech Language Path Adult Consult   Evaluate and treat as clinically indicated.    Reason:  Dysphagia     Fall precautions     Pneumatic Compression Device    Bilateral calf. Remove 30 mins BID.     Advance Diet as Tolerated   Follow this diet upon discharge: Orders Placed This Encounter     Room Service     Combination Diet Dysphagia Diet Level 2: Mechan Altered; Thin Liquids (water, ice chips, juice, milk gelatin, ice cream, etc)  ASpiration precaution       Discharge Medications   Current Discharge Medication List      START taking these medications    Details   budesonide (UCERIS) 9 MG 24 hr tablet Take 1 tablet (9 mg) by mouth daily  Qty: 30 tablet, Refills: 1    Associated Diagnoses: Collagenous colitis      loperamide (IMODIUM) 2 MG capsule Take 2 capsules (4 mg) by mouth 4 times daily as needed for diarrhea  Qty: 20 capsule    Comments: ho  Associated Diagnoses: Diarrhea, unspecified type      simethicone (MYLICON) 80 MG chewable tablet Take 1 tablet (80 mg) by mouth every 6 hours as needed for cramping  Qty: 180 tablet    Associated Diagnoses: Flatulence, eructation and gas pain         CONTINUE these medications which have NOT CHANGED    Details   apixaban ANTICOAGULANT (ELIQUIS) 5 MG tablet Take 1 tablet (5 mg) by mouth 2 times daily  Qty: 180 tablet, Refills: 2    Associated Diagnoses: Atrial fibrillation and flutter (H)      ASPIRIN PO Take 81 mg by mouth every evening       gabapentin (NEURONTIN) 300 MG capsule Take 2 capsules (600 mg) by mouth every evening  Qty: 60 capsule, Refills: 5    Associated Diagnoses: Restless legs syndrome (RLS)      metoprolol succinate (TOPROL-XL) 25 MG 24 hr tablet Take 0.5 tablets (12.5 mg) by mouth daily  Qty: 45 tablet, Refills: 2    Associated Diagnoses: Nonrheumatic aortic valve stenosis      multivitamin (OCUVITE) TABS Take 1 tablet by  mouth 2 times daily       Cyanocobalamin 2000 MCG TABS Take 2,000 mcg by mouth every 7 days On Sundays         STOP taking these medications       acetaminophen (TYLENOL) 500 MG tablet Comments:   Reason for Stopping:         atorvastatin (LIPITOR) 40 MG tablet Comments:   Reason for Stopping:             Allergies   Allergies   Allergen Reactions     Adhesive Tape Blisters     Amiodarone      Lasix [Furosemide] Other (See Comments)     Throat swelling, wheezing     Penicillins Unknown     Sulfa Drugs Difficulty breathing     Data[PK1.3]   Most Recent 3 CBC's:[PK1.2]  Recent Labs   Lab Test  09/10/18   0750  09/09/18   0601  09/07/18   0835   WBC  11.2*  11.6*  12.7*   HGB  11.2*  11.0*  11.7*   MCV  95  96  95   PLT  294  263  262[PK1.4]      Most Recent 3 BMP's:[PK1.2]  Recent Labs   Lab Test  09/10/18   0750  09/09/18   1745  09/09/18   0601  09/08/18   0839   NA  141   --   142  141   POTASSIUM  4.3  4.1  4.0  3.9   CHLORIDE  114*   --   115*  115*   CO2  19*   --   19*  18*   BUN  7   --   9  13   CR  0.69   --   0.79  0.85   ANIONGAP  8   --   8  8   ULISSES  7.4*   --   7.2*  7.2*   GLC  95   --   82  102*[PK1.4]     Most Recent 2 LFT's:[PK1.2]  Recent Labs   Lab Test  09/10/18   0750  09/09/18   0601   AST  159*  240*   ALT  183*  194*   ALKPHOS  424*  448*   BILITOTAL  0.9  1.3[PK1.4]     Most Recent INR's and Anticoagulation Dosing History:  Anticoagulation Dose History     Recent Dosing and Labs Latest Ref Rng & Units 9/4/2015 8/1/2016 9/5/2017    INR 0.86 - 1.14 1.02 1.17(H) 1.02        Most Recent 3 Troponin's:[PK1.2]  Recent Labs   Lab Test  09/05/18   1628  07/27/16   1400  12/07/15   1655   TROPI  0.027  <0.015  The 99th percentile for upper reference range is 0.045 ug/L.  Troponin values in   the range of 0.045 - 0.120 ug/L may be associated with risks of adverse   clinical events.    0.022[PK1.4]     Most Recent Cholesterol Panel:[PK1.2]  Recent Labs   Lab Test  05/30/18   1204   CHOL  146   LDL  67    HDL  61   TRIG  90[PK1.4]     Most Recent 6 Bacteria Isolates From Any Culture (See EPIC Reports for Culture Details):[PK1.2]  Recent Labs   Lab Test  09/05/18 2020 09/05/18   1940  08/02/16   1609  07/28/16   0620   CULT  No growth after 5 days  No growth after 5 days  Culture negative for acid fast bacilli  Assayed at [facility] [address]    No growth after 4 weeks  Culture negative after 4 weeks  No Legionella species isolated  No anaerobes isolated  No growth  Canceled, Test credited  >10 Squamous epithelial cells/low power field indicates oral contamination.   Please recollect.  Called to David Cobb [phone] @ [phone]. [phone]  *[PK1.4]     Most Recent TSH, T4 and A1c Labs:[PK1.2]  Recent Labs   Lab Test  09/06/18   0602   04/06/16   1252   09/04/15   1040   TSH  2.05   < >  6.32*   < >   --    T4   --    --   0.90   < >   --    A1C   --    --    --    --   5.3    < > = values in this interval not displayed.     Results for orders placed or performed during the hospital encounter of 09/05/18   XR Chest 2 Views    Narrative    CHEST TWO VIEWS  9/5/2018 5:11 PM     HISTORY: Weakness, cough.     COMPARISON: 8/7/2016      Impression    IMPRESSION: Elevated left diaphragm. Interval clearing of interstitial  infiltrates noted on the previous exam. No acute infiltrates or  pleural effusion. Mitral annulus calcification.    RAJI THAKUR MD   US Abdomen Limited    Narrative    US ABDOMEN LIMITED  9/5/2018 6:38 PM      HISTORY: RUQ pain.      COMPARISON: None.    FINDINGS: The liver is normal in size and texture without focal mass.  There is no intra or extrahepatic biliary dilatation. The common bile  duct measures 0.5 cm.  The gallbladder is normal in appearance without  gallstones.  The pancreas head and body appear normal. The tail is  obscured by bowel gas.  The right kidney measures 9.4 cm and is normal  in appearance.      Impression    IMPRESSION: Normal right upper  quadrant. No gallstone or biliary  dilatation.    CLARA BHARDWAJ MD   Abd/pelvis CT no contrast - Stone Protocol    Narrative    CT ABDOMEN AND PELVIS WITHOUT CONTRAST   9/5/2018 7:57 PM     HISTORY: Abdominal pain, leukocytosis.     TECHNIQUE: Noncontrast CT abdomen and pelvis was performed. Radiation  dose for this scan was reduced using automated exposure control,  adjustment of the mA and/or kV according to patient size, or iterative  reconstruction technique.    COMPARISON: None.    FINDINGS:  Abdomen: There is mild atelectasis and scarring at the lung bases.  Coronary artery atherosclerotic calcifications. Mitral valve  calcifications. Evaluation of the solid abdominal organs is limited by  the lack of intravenous contrast. The liver, spleen, gallbladder,  pancreas, adrenal glands and kidneys are normal in appearance. There  is atherosclerotic calcification of aorta and its branches. No  aneurysm. There is no abdominal or pelvic lymph node enlargement.    Pelvis: Small and large bowel are filled with gas and fluid to the  rectum. No bowel obstruction. No evidence of inflammation. There is no  free intraperitoneal gas or fluid. There is a small posterior right  urinary bladder diverticulum. The prostate gland is mildly enlarged.      Impression    IMPRESSION: No acute abnormality. No bowel obstruction or  inflammation.    CLARA BHARDWAJ MD   XR Video Swallow w/o Esophagram    Narrative    VIDEO SWALLOWING EVALUATION   9/7/2018 9:29 AM     HISTORY: Status post CA of the VC and stroke. Globus sensation with  swallowing.     COMPARISON: 6/30/2015.    FLUOROSCOPY TIME: 4.1 minutes. 11 cine video clips were obtained.     FINDINGS:    Solid: Minimal residue. No aspiration or penetration.    Pill: Difficulty with oral transit. No aspiration. There was some  esophageal dysmotility. There was failure of relaxation of the lower  esophageal sphincter during the exam. The liquid readily passed the  lower esophageal  sphincter but the pill did not pass during the exam.    Pudding: Mild residue. No aspiration or penetration.    Honey: Not tested.    Nectar: Not tested.    Thin: Multiple swallows with penetration. One swallow did produce  aspiration without cough. There was moderate residue. There was  premature spillage into both the vallecula and piriform sinuses. The  penetration improved with chin tuck maneuver.    This study only includes the cervical esophagus.  For more detailed  information, please see speech therapy report.    JOSE F STEPHENS MD   CT Head w/o Contrast    Narrative    CT SCAN OF THE HEAD WITHOUT CONTRAST   9/6/2018 5:16 PM     HISTORY:  Weakness. Rule out stroke.     TECHNIQUE:  Axial images of the head and coronal reformations without  IV contrast material.  Radiation dose for this scan was reduced using  automated exposure control, adjustment of the mA and/or kV according  to patient size, or iterative reconstruction technique.    COMPARISON: None.    FINDINGS: There has been a previous left frontal tim hole. The  calvarium is otherwise intact. Vascular calcifications are seen at the  skull base. Mild to moderate cerebral atrophy is present. Minimal  nonspecific white matter changes are present. There is a low density  in the left corona radiata which is probably an old lacunar infarct,  although it is difficult to exclude a subacute infarct. There is also  an old left basal ganglia infarct. There is no evidence for  intracranial hemorrhage, mass effect, acute infarct, or skull  fracture.      Impression    IMPRESSION: Chronic changes. No evidence for intracranial hemorrhage  or any acute process. If clinically indicated, MRI of the brain  without and with contrast might be more sensitive in evaluating for a  subtle acute infarct.    JOSE F STEPHENS MD[PK1.4]        Revision History        User Key Date/Time User Provider Type Action    > PK1.4 9/10/2018 11:47 AM Emeli Reyes MD Physician Sign     PK1.2  9/10/2018 11:35 AM Emeli Reyes MD Physician      PK1.3 9/10/2018 10:03 AM Emeli Reyes MD Physician      PK1.1 9/10/2018 10:02 AM Emeli Reyes MD Physician                      Consult Notes      Consults by Pavan Gray at 9/9/2018  3:22 PM     Author:  Pavan Gray Service:  Social Work Author Type:      Filed:  9/9/2018  3:48 PM Date of Service:  9/9/2018  3:22 PM Creation Time:  9/9/2018  3:22 PM    Status:  Addendum :  Pavan Gray ()     Consult Orders:    1. Social Work IP Consult [[id number]] ordered by Emeli Reyes MD at 09/09/18 1137                Care Transition Initial Assessment - SW  Reason For Consult: discharge planning  Met with: Patient    Active Problems:    Hypokalemia       DATA  Lives With: spouse  Living Arrangements: house     Who is your support system?: Wife, son  Identified issues/concerns regarding health management: SW consult noted for DC planning. Patient is a 73 year old male anticipated to be ready for DC in 1 day. PT recommends a TCU stay. At baseline patient lives with his spouse in their home with steps. He uses a cane or walker.   Met with patient and reviewed Medicare guidelines for coverage of a TCU stay. Patient has been at [facility] in the past (8/16) and he would prefer placement there again. If other options are needed, his back up choices would be [facility] or [facility]. He prefers not to pay private room fees. He believes his spouse or son would be able to provide transport.[RH1.1] Updated spouse, Mariajose.[RH1.2]    A referral was sent via [facility] to [facility].     Quality Of Family Relationships: supportive, involved     ASSESSMENT  Cognitive Status: A&O  Concerns to be addressed: SW will follow for DC planning. No other concerns evident.   PLAN  Financial costs for the patient includes: None anticipated  Patient given options and choices for discharge: Yes  Patient/family is agreeable to the plan? Yes  Patient  "Goals and Preferences: TCU  Patient anticipates discharging to: TCU[RH1.1]           Revision History        User Key Date/Time User Provider Type Action    > RH1.2 9/9/2018  3:48 PM Pavan Gray  Addend     RH1.1 9/9/2018  3:32 PM Pavan Gray Worker Sign            Consults by Yudy Novoa MD at 9/6/2018  9:23 AM     Author:  Yudy Novoa MD Service:  Gastroenterology Author Type:  Physician    Filed:  9/6/2018  8:28 PM Date of Service:  9/6/2018  9:23 AM Creation Time:  9/6/2018  9:23 AM    Status:  Addendum :  Yudy Novoa MD (Physician)     Consult Orders:    1. Gastroenterology IP Consult: chronic diarrhea, dysphagia; Consultant may enter orders: Yes; Patient to be seen: Routine - within 24 hours [[id number]] ordered by Aidan Zazueta MD at 09/05/18 2056                [facility]  Gastroenterology Consultation    Efrem Ramos  [address] [address]  73 year old male    Admission Date/Time: 9/5/2018  Primary Care Provider: Danny Paige    We were asked to see the patient in consultation by Dr. Zazueta for evaluation of chronic diarrhea.        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  He has MGUS which is being followed by his oncologist. He also has a history of atrial fibrillation and stroke, on apixaban.  The patient has a long history of \"colitis\".  He reports being told at one time that he had \"ulcerative colitis\" but was later told this is not the case.  The patient reports 4-5 watery stools per day.  He denies hematochezia or melena. His bowel movements have been more frequent in the past 2 weeks.      His last colonoscopy[KW1.1] in 8/2013 was incomplete due to a redundant colon.  Random biopsies were obtained for diarrhea and he was found to have collagenous colitis.  He also had one adenomatous polyp and was " "recommended to return for a repeat colonoscopy in 8/2018.  He had  a virtual colonoscopy after his last colonoscopy in 9/2013 which was normal except for scattered sigmoid diveticulosis.[KW1.2]      The patient reports 4-5 watery stool per day.  He denies abdominal pain.  He has no hematochezia or melena.  Stool studies are negative for evidence of infection.  The patient also complains of onset of dysphagia to pills and solid food in the last 2 weeks.  He reports a \"choking\" sensation with the inability to breathe when food gets stuck.  He has a speech evaluation ordered.    ROS: A comprehensive ten point review of systems was negative aside from those in mentioned in the HPI.      MEDICATIONS:   No current facility-administered medications on file prior to encounter.   Current Outpatient Prescriptions on File Prior to Encounter:  apixaban ANTICOAGULANT (ELIQUIS) 5 MG tablet Take 1 tablet (5 mg) by mouth 2 times daily   ASPIRIN PO Take 81 mg by mouth every evening    atorvastatin (LIPITOR) 40 MG tablet Take 1 tablet (40 mg) by mouth daily (Patient taking differently: Take 40 mg by mouth every evening )   gabapentin (NEURONTIN) 300 MG capsule Take 2 capsules (600 mg) by mouth every evening   metoprolol succinate (TOPROL-XL) 25 MG 24 hr tablet Take 0.5 tablets (12.5 mg) by mouth daily (Patient taking differently: Take 12.5 mg by mouth every evening )   multivitamin (OCUVITE) TABS Take 1 tablet by mouth 2 times daily    acetaminophen (TYLENOL) 500 MG tablet Take 1,000 mg by mouth every 6 hours as needed for mild pain   Cyanocobalamin 2000 MCG TABS Take 2,000 mcg by mouth every 7 days On Sundays       ALLERGIES:   Allergies   Allergen Reactions     Adhesive Tape Blisters     Amiodarone      Lasix [Furosemide] Other (See Comments)     Throat swelling, wheezing     Penicillins Unknown     Sulfa Drugs Difficulty breathing       Past Medical History:   Diagnosis Date     Atrial fibrillation (H)      Cancer (H) vocal cord "     Carpal tunnel syndrome     abstracted 7/3/02.     CVA (cerebral infarction) 5/5/2015     Demyelinating disease of central nervous system, unspecified     abstracted 7/3/02.     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     Pneumonia 8/17/2016     Redundant colon     needs CT colonography     S/P coronary angiogram 09/05/2017     Shingles      SKIN DISORDERS NEC 3/15/2005     Sleep apnea        Past Surgical History:   Procedure Laterality Date     BIOPSY  brain 2002     BONE MARROW BIOPSY, BONE SPECIMEN, NEEDLE/TROCAR N/A 6/8/2017    Procedure: BIOPSY BONE MARROW;  UNILATERAL BONE MARROW BIOPSY (CONSCIOUS SEDATION) ;  Surgeon: Jamie Gonzales MD;  Location:  GI     C NONSPECIFIC PROCEDURE  as a child    T & A. abstracted 7/3/02.     C NONSPECIFIC PROCEDURE  early    CTR. abstracted 7/3/02.     HEAD & NECK SURGERY       ORTHOPEDIC SURGERY      right arm ulna reset after injury     THORACOSCOPIC WEDGE RESECTION LUNG Right 8/2/2016    Procedure: THORACOSCOPIC WEDGE RESECTION LUNG;  Surgeon: Abdelrahman Noriega MD;  Location:  OR         SOCIAL HISTORY:  Social History   Substance Use Topics     Smoking status: Former Smoker     Packs/day: 1.00     Years: 30.00     Types: Cigarettes     Quit date: 7/26/2013     Smokeless tobacco: Never Used     Alcohol use 25.2 oz/week     42 Standard drinks or equivalent per week      Comment: occ       FAMILY HISTORY:  Family History   Problem Relation Age of Onset     Blood Disease Mother      Anemia     Cardiovascular Father      Cancer - colorectal Maternal Grandfather      Hypertension Brother      Diabetes Sister 5     Juvinile Diabetes passed at 36     Respiratory Other      Lung Cancer       PHYSICAL EXAM:   BP 92/68 (BP Location: Left arm)  Temp 97.3  F (36.3  C) (Axillary)  Resp 12   "Ht 1.727 m (5' 8\")  Wt 80.9 kg (178 lb 5.6 oz)  SpO2 98%  BMI 27.12 kg/m2    Constitutional: NAD, comfortable  Cardiovascular: RRR, normal S1 and S2, no r/c/g/m  Respiratory: CTAB  Psychiatric: mentation appears normal and affect normal  Head: Normocephalic. Atraumatic.    Neck: Neck supple. No adenopathy. Thyroid symmetric, normal size, trachea midline  Eyes:  PERRL, no icterus  ENT: Hearing adequate, pharynx normal without erythema or exudate  Abdomen:   Auscultation: + BS  Appearance: non-distended  Palpation: non-tender  NEURO: grossly negative  SKIN: no suspicious lesions or rashes  LYMPH:   anterior cervical: no adenopathy  posterior cervical: no adenopathy  supraclavicular: no adenopathy          ADDITIONAL COMMENTS:   I reviewed the patient's new clinical lab test results.   Recent Labs   Lab Test  09/06/18   0602 09/05/18   1628  03/21/18   1303   09/05/17   0950   08/01/16   1733   09/04/15   1040   WBC  18.4*  19.9*  11.6*   < >  10.9   < >  14.9*   < >  9.4   HGB  11.9*  14.4  13.0*   < >  13.0*   < >  11.8*   < >  12.9*   MCV  94  92  97   < >  93   < >  94   < >  99   PLT  272  366  293   < >  302   < >  328   < >  350   INR   --    --    --    --   1.02   --   1.17*   --   1.02    < > = values in this interval not displayed.     Recent Labs   Lab Test  09/06/18   0602 09/05/18   2240  09/05/18   1628  03/21/18   1303   NA  147*   --   138  141   POTASSIUM  3.3*  2.8*  2.3*  3.9   CHLORIDE  121*   --   104  108   CO2  20   --   23  28   BUN  27   --   38*  8   CR  1.20   --   1.66*  0.82   ANIONGAP  6   --   11  5   ULISSES  7.2*   --   8.5  8.3*   GLC  87   --   93  84     Recent Labs   Lab Test  09/06/18   0602  09/05/18   1905  09/05/18   1628  03/21/18   1303   06/14/16   1252   ALBUMIN  2.5*   --   3.2*  3.0*   < >  2.8*   BILITOTAL  1.2   --   1.2  0.9   < >  0.7   DBIL  0.5*   --    --    --    --   0.3*   ALT  100*   --   97*  14   < >  46   AST  146*   --   118*  19   < >  46*   ALKPHOS  323* " "  --   313*  125   < >  102   PROTEIN   --   10*   --    --    --    --    LIPASE   --    --   294   --    --    --     < > = values in this interval not displayed.             .    CONSULTATION ASSESSMENT AND PLAN:      Pt with longstanding history of diarrhea.  Previously told had \"colitis\".  Symptoms are most consistent with microscopic colitis.[KW1.1]  Biopsies on colonoscopy in 2013 showed collagenous colitis.[KW1.2]  Stool studies are negative for infection.  CT of A/P and US are negative.    -  Will check thyroid levels, as well as, celiac panel.  -[KW1.1]  Recommend treatment with budesonide x 8 weeks.[KW1.2]  -  IVF, diet as tolerated.  -  Speech evaluation for dysphagia, most consistent with oropharyngeal, however, will consider upper endoscopy for evaluation of the esophagus if needed.        I discussed the patient's findings and plan with Dr. Novoa.          CHANDLER Castillo  [facility]  Office:  [phone] call if needed after 5PM  Cell:  [phone], not available after 5PM at this number[KW1.1]    GI Attending Note:  Chart reviewed, patient interviewed and examined.  Chronic, severe diarrhea with prior history of collagenous colitis, now started on Entocort.  He also has dysphagia, even occasionally for liquids, with a history of vocal cord carcinoma in remission and a history of demyelinating disease of CNS.  PE: VSS, afebrile. He appears in NAD.  Abd: soft, nontender, no masses, no organomegatly, +bs.  Labs as noted.    A/P: agree with Tonya Barrera[CS1.1]e[CS1.2]carlos a PA's assessment and plan.  Will follow on Entocort.  Await speech pathology swallowing evaluation next.    Yudy Novoa MD  [facility]  Office: [phone]  Cell:  [phone]  Monday through Thursday 8am to 5pm, Friday 8am to 4pm[CS1.1]     Revision History        User Key Date/Time User Provider Type Action    > CS1.2 9/6/2018  8:28 PM Yudy Novoa MD Physician Addend     CS1.1 " "2018  8:27 PM Yudy Novoa MD Physician Addend     KW1.2 2018 11:32 AM Tonya Abernathy PA-C Physician Assistant - [facility] Addend     KW1.1 2018  9:43 AM Tonya Abernathy PA-C Physician Assistant - [facility] Sign                     Progress Notes - Physician (Notes from 18 through 09/10/18)      Progress Notes by Sulma Belle APRN CNP at 9/10/2018  9:34 AM     Author:  Sulma Belle APRN CNP Service:  Gastroenterology Author Type:  Nurse Practitioner    Filed:  9/10/2018 11:49 AM Date of Service:  9/10/2018  9:34 AM Creation Time:  9/10/2018  9:34 AM    Status:  Addendum :  Sulma Belle APRN CNP (Nurse Practitioner)         GASTROENTEROLOGY PROGRESS NOTE     SUBJECTIVE: The patient states diarrhea has improved - reports having 0-1 bowel movement per day over the weekend. No complaints of dysphagia. Seems to have some abdominal discomfort in the evening hours - none currently.     OBJECTIVE:   /90 (BP Location: Left arm)  Pulse 81  Temp 97.4  F (36.3  C) (Axillary)  Resp 16  Ht 1.727 m (5' 8\")  Wt 84.5 kg (186 lb 3.2 oz)  SpO2 90%  BMI 28.31 kg/m2   Temp (24hrs), Av.4  F (36.3  C), Min:97.4  F (36.3  C), Max:97.4  F (36.3  C)     Patient Vitals for the past 72 hrs:   Weight   09/10/18 0634 84.5 kg (186 lb 3.2 oz)   18 0500 81.4 kg (179 lb 7.3 oz)   18 0630 81.4 kg (179 lb 6.4 oz)        Intake/Output Summary (Last 24 hours) at 09/10/18 0934  Last data filed at 09/10/18 0800   Gross per 24 hour   Intake             2684 ml   Output                0 ml   Net             2684 ml      PHYSICAL EXAM   Constitutional: in bed, no acute distress  Cardiovascular: Regular rate and rhythm. No murmurs rubs or gallops  Respiratory: Clear to auscultation bilaterally  Abdomen: Soft, non-tender, non-distended, normally active bowel sounds. No guarding or rebound tenderness.    Additional Comments:   ROS, FH, SH: See initial GI consult for details.     I " have reviewed the patient's new clinical lab results:   Recent Labs   Lab Test  09/10/18   0750  09/09/18   0601  09/07/18   0835   09/05/17   0950   08/01/16   1733   09/04/15   1040   WBC  11.2*  11.6*  12.7*   < >  10.9   < >  14.9*   < >  9.4   HGB  11.2*  11.0*  11.7*   < >  13.0*   < >  11.8*   < >  12.9*   MCV  95  96  95   < >  93   < >  94   < >  99   PLT  294  263  262   < >  302   < >  328   < >  350   INR   --    --    --    --   1.02   --   1.17*   --   1.02    < > = values in this interval not displayed.      Recent Labs   Lab Test  09/10/18   0750  09/09/18   1745  09/09/18   0601  09/08/18   0839   NA  141   --   142  141   POTASSIUM  4.3  4.1  4.0  3.9   CHLORIDE  114*   --   115*  115*   CO2  19*   --   19*  18*   BUN  7   --   9  13   CR  0.69   --   0.79  0.85   ANIONGAP  8   --   8  8   ULISSES  7.4*   --   7.2*  7.2*      Recent Labs   Lab Test  09/10/18   0750  09/09/18   0601  09/08/18   0839   09/05/18   1905  09/05/18   1628   ALBUMIN  2.5*  2.4*  2.4*   < >   --   3.2*   BILITOTAL  0.9  1.3  1.5*   < >   --   1.2   ALT  183*  194*  163*   < >   --   97*   AST  159*  240*  213*   < >   --   118*   ALKPHOS  424*  448*  283*   < >   --   313*   PROTEIN   --    --    --    --   10*   --    LIPASE   --    --    --    --    --   294    < > = values in this interval not displayed.          Assessment:  This is a pleasant 73 year old male with a history of collagenous colitis and redundant colon who we are following for diarrhea, dysphagia, and elevated liver function tests. He had symptoms consistent with reoccurrence of microscopic colitis and has had an excellent response to the initiation of budesonide.      Plan:  -Diarrhea    Enteric pathogen panel/c diff negative    History of collagenous colitis. Continue budesonide[LB1.1] 9mg[LB1.2] for 8 weeks   History of redundant colon and diverticulosis    Replace potassium as needed   -Elevated LFTs   Cause remains unknown - alkaline phosphatase 424,  ,    Imaging studies unremarkable - US/CT   Additional liver studies are pending   Continue to hold Statin and avoid hepatoxic medications   -Dysphagia    Benign Schatzki's ring was dilated with recent EGD   Biopsies from EGD are pending   If dysphagia persists, could consider esophageal manometry in the future[LB1.1]  -OK to discharge per GI (discussed with Dr. Enrique) f[LB1.2]ollow up in [facility][LB1.1] in 1-2 weeks[LB1.2]     Sulma Belle CNP  [facility]  Cell: [phone]  Monday through Friday [id number]  Office: [phone][LB1.1]     Revision History        User Key Date/Time User Provider Type Action    > [N/A] 9/10/2018 11:49 AM Sulma Belle APRN CNP Nurse Practitioner Addend     LB1.2 9/10/2018 11:48 AM Sulma Belle APRN CNP Nurse Practitioner Addend     LB1.1 9/10/2018  9:54 AM Sulma Belle APRN CNP Nurse Practitioner Sign            Progress Notes by Emeli Reyes MD at 9/9/2018 10:34 AM     Author:  Emeli Reyes MD Service:  Hospitalist Author Type:  Physician    Filed:  9/9/2018  3:20 PM Date of Service:  9/9/2018 10:34 AM Creation Time:  9/9/2018 10:34 AM    Status:  Addendum :  Emeli Reyes MD (Physician)         [facility]    Hospitalist Progress Note    Assessment & Plan   Efrem Ramos is a 73 year old male with PMH of Afib- on Eliquis, CVA, HLP, MGUS, 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     Diarrhea  - h/o chronic diarrhea  - last colonoscopy in 8/2013 was incomplete due to a redundant colon.  Random biopsies were obtained for diarrhea and he was found to have collagenous colitis; he had  a virtual colonoscopy after his last colonoscopy in 9/2013 which was normal except for scattered sigmoid diverticulosis- as per notes  - no associated fever, no abd pain, no blood in stool  -  WBCs elevated up to 18.4, but gradually improving   - CT abdomen and US abdomen- no acute pathology  - C diff negative; stool sample for enteric bacteria and viruses- negative  -Continue with Budesonide daily for 8 weeks and scheduled Imodium QID  - diet as tolerated  - iv fluids 1/2NS with KCl at 100cc/h, stop IVF if diarrhea continues to improve.  However if diarrhea recurs he may need colonoscopy.  GI following and consult appreciated     Dysphagia to solid food   Voice change  - reported difficulty swallowing solids and pills and change of voice for the last 2 weeks  - has h/o vocal cord cancer and is believed to be in remission  - SLP- rec DD2 with thin liquids, aspiration precaution, status post video swallow earlier today, does show some silent aspiration with thin liquid, see below for details  -Will continue to need outpatient speech evaluation and treatment, s/p  EGD 9/8/18-.Minimal schatzki's ring :  Biopsied for disruption  Tertiary esophageal contractions ?esophageal spasm.Exam otherwise negative.      Hypokalemia   - likely due to diarrhea  - K replacement protocol       Acute kidney injury   - Probably prerenal due to diarrhea  -Currently on IV fluids and renal function now improved.  Continue to monitor     Hypernatremia  - Na 147, improved after IV fluids changed to half normal saline  -Monitor     Elevated liver transaminases    - Total bilirubin is 1.2, ALT 97--100, --146, alkaline phosphatase 313 and lipase 294  - Upper quadrant ultrasound negative[PK1.1], viral serology pending, GI following[PK1.2]  -[PK1.1]Continue to hold[PK1.2] PTA statin for now[PK1.1] and[PK1.2] avoid hepatotoxic drugs  -Continue to monitor     Leukocytosis  - cause?- stress?, dehydration?others, improved today, patient has been afebrile  - UA negative, CXR- no acute infiltrates, CT abd- no acute pathology  - c diff and stool cultures negative  - continue to monitor     Lethargy/weakness  - as per son- pt is more  lethargic in the last few weeks- possible related to dehydration/diarrhea and electrolyte imbalance  -Head CT negative, PT OT recommending TCU,      Chronic atrial fibrillation  Hypertension  - rate controlled on PTA Toprol XL 12.5 mg po qdaily  - continue PTA apixaban      Hypercholesterolemia   - hold PTA Lipitor for now given elevated LFTs      MGUS which is being monitored by his oncologist    Pain assessment :  Current Pain Score 9/9/2018   Patient currently in pain? denies   Pain score (0-10) -   Pain location -   Pain descriptors -   Efrem s pain level was assessed and he currently denies pain.      DVT Prophylaxis: Eliquis  Code Status: Full code    Disposition: Expected discharge in 1-2 days once diarrhea improves, renal function/electrolytes remained stable off IV fluids and clearance by GI[PK1.1] and placement found[PK1.2].    Emeli Reyes MD  Text page (7am - 6pm)    Interval History     Patient reports his diarrhea has improved today and last night he just had one bowel movement.  Has some occasional gassy distention but feels better today.  No new nursing concerns  -Data reviewed today: I reviewed all new labs and imaging results over the last 24 hours. I personally reviewed the Barium swallow evaluation image(s) showing See below.    Physical Exam   Temp: 97.5  F (36.4  C) Temp src: Oral BP: 104/68   Heart Rate: 58 Resp: 16 SpO2: 96 % O2 Device: None (Room air)    Vitals:    09/06/18 0644 09/08/18 0630 09/09/18 0500   Weight: 80.9 kg (178 lb 5.6 oz) 81.4 kg (179 lb 6.4 oz) 81.4 kg (179 lb 7.3 oz)     Vital Signs with Ranges  Temp:  [97.3  F (36.3  C)-97.6  F (36.4  C)] 97.5  F (36.4  C)  Heart Rate:  [58-70] 58  Resp:  [15-18] 16  BP: ()/(48-68) 104/68  SpO2:  [93 %-99 %] 96 %  I/O last 3 completed shifts:  In: 6303 [P.O.:360; I.V.:5943]  Out: -     Exam:  Constitutional: Awake, alert and no distress. Appears comfortable  Head: Normocephalic. No masses, lesions, tenderness or  abnormalities  ENT: no neck nodes or sinus tenderness  Cardiovascular: Irregular but rate controlled, 2+ murmurs, no rub or gallop no JVD  Respiratory: Normal WOB, no wheezes or crackles   Gastrointestinal: Abdomen soft, non-tender. BS normal. No masses, organomegaly  : Deferred  Extremities: No edema , no clubbing /cyanosis   Skin: Warm and dry, no rash        Medications     IV infusion builder WITH additives 100 mL/hr at 09/09/18 1000     - MEDICATION INSTRUCTIONS -       - MEDICATION INSTRUCTIONS -         apixaban ANTICOAGULANT  5 mg Oral BID     aspirin chewable tablet 81 mg  81 mg Oral QPM     budesonide  9 mg Oral Daily     cyanocobalamin  2,000 mcg Oral Q7 Days     gabapentin  600 mg Oral QPM     loperamide  4 mg Oral 4x daily     metoprolol succinate  12.5 mg Oral QPM     sodium chloride (PF)  3 mL Intracatheter Q8H       Data     Recent Labs  Lab 09/09/18  0601 09/08/18  0839 09/07/18  0835  09/06/18  0602  09/05/18  1628   WBC 11.6*  --  12.7*  --  18.4*  --  19.9*   HGB 11.0*  --  11.7*  --  11.9*  --  14.4   MCV 96  --  95  --  94  --  92     --  262  --  272  --  366    141 143  --  147*  --  138   POTASSIUM 4.0 3.9 3.4  < > 3.3*  < > 2.3*   CHLORIDE 115* 115* 113*  --  121*  --  104   CO2 19* 18* 20  --  20  --  23   BUN 9 13 13  --  27  --  38*   CR 0.79 0.85 1.06  --  1.20  --  1.66*   ANIONGAP 8 8 10  --  6  --  11   ULISSES 7.2* 7.2* 7.0*  --  7.2*  --  8.5   GLC 82 102* 77  --  87  --  93   ALBUMIN 2.4* 2.4* 2.4*  --  2.5*  --  3.2*   PROTTOTAL 6.4* 6.4* 6.3*  --  6.7*  --  8.5   BILITOTAL 1.3 1.5* 1.3  --  1.2  --  1.2   ALKPHOS 448* 283* 312*  --  323*  --  313*   * 163* 131*  --  100*  --  97*   * 213* 188*  --  146*  --  118*   LIPASE  --   --   --   --   --   --  294   TROPI  --   --   --   --   --   --  0.027   < > = values in this interval not displayed.    Imaging:  No results found for this or any previous visit (from the past 24 hour(s)).[PK1.1]     Revision  "History        User Key Date/Time User Provider Type Action    > [N/A] 2018  3:20 PM Emeli Reyes MD Physician Addend     PK1.2 2018  3:18 PM Emeli Reyes MD Physician Sign     PK1.1 2018  3:16 PM Emeli Reyes MD Physician             Progress Notes by Xander Marie MD at 2018  9:54 AM     Author:  Xander Marie MD Service:  Gastroenterology Author Type:  Physician    Filed:  2018  9:59 AM Date of Service:  2018  9:54 AM Creation Time:  2018  9:54 AM    Status:  Signed :  Xander Marie MD (Physician)         [facility]  Gastroenterology Progress note      Assessment       No obvious change in swallowing post egd.  Finished his breakfast (omelet) without difficulty.  Some coughing when he lies flat.  Doubt significant esophageal component to his symptoms      Plan     Diet changes per speech pathology   workup for abnormal LFT\"s pending.     Interval History     see above.       Physical Exam    /68 (BP Location: Left arm)  Temp 97.5  F (36.4  C) (Oral)  Resp 16  Ht 1.727 m (5' 8\")  Wt 81.4 kg (179 lb 7.3 oz)  SpO2 96%  BMI 27.29 kg/m2  Temp (24hrs), Av.4  F (36.3  C), Min:97.3  F (36.3  C), Max:97.6  F (36.4  C)    Patient Vitals for the past 72 hrs:   Weight   18 0500 81.4 kg (179 lb 7.3 oz)   18 0630 81.4 kg (179 lb 6.4 oz)       Intake/Output Summary (Last 24 hours) at 18 0954  Last data filed at 18 0702   Gross per 24 hour   Intake             5897 ml   Output                0 ml   Net             5897 ml         Constitutional: NAD, comfortable  Cardiovascular: RRR, normal S1, S2   Respiratory: CTAB  Abdomen: soft, non-tender, nondistended,  bs+      Additional Comments     ROS, FH, SH: See initial GI consult for details.    Lab Data     Recent Labs   Lab Test  18   0835  18   0602  18   1628   17   0950   16   1733   09/04/15   1040   WBC  12.7*  " 18.4*  19.9*   < >  10.9   < >  14.9*   < >  9.4   HGB  11.7*  11.9*  14.4   < >  13.0*   < >  11.8*   < >  12.9*   MCV  95  94  92   < >  93   < >  94   < >  99   PLT  262  272  366   < >  302   < >  328   < >  350   INR   --    --    --    --   1.02   --   1.17*   --   1.02    < > = values in this interval not displayed.     Recent Labs   Lab Test  09/08/18   0839  09/07/18   0835  09/06/18   1328  09/06/18   0602   NA  141  143   --   147*   POTASSIUM  3.9  3.4  3.4  3.3*   CHLORIDE  115*  113*   --   121*   CO2  18*  20   --   20   BUN  13  13   --   27   CR  0.85  1.06   --   1.20   ANIONGAP  8  10   --   6   ULISSES  7.2*  7.0*   --   7.2*     Recent Labs   Lab Test  09/08/18   0839  09/07/18   0835  09/06/18   0602  09/05/18   1905  09/05/18   1628   ALBUMIN  2.4*  2.4*  2.5*   --   3.2*   BILITOTAL  1.5*  1.3  1.2   --   1.2   ALT  163*  131*  100*   --   97*   AST  213*  188*  146*   --   118*   ALKPHOS  283*  312*  323*   --   313*   PROTEIN   --    --    --   10*   --    LIPASE   --    --    --    --   294               S. Gino Marie MD  [facility]  Office: [phone] call if needed after 5PM or on weekends  Cell: [phone], not available after 5PM at this number[SF1.1]     Revision History        User Key Date/Time User Provider Type Action    > SF1.1 9/9/2018  9:59 AM Xander Marie MD Physician Sign            Progress Notes by Mary Kate Sainz, RN at 9/8/2018  4:47 PM     Author:  Mary Kate Sainz, RN Service:  (none) Author Type:  Registered Nurse    Filed:  9/8/2018  4:50 PM Date of Service:  9/8/2018  4:47 PM Creation Time:  9/8/2018  4:47 PM    Status:  Signed :  Mary Ktae Sainz, RN (Registered Nurse)         Pt blood pressure this evening 87/48, sepsis protocol fired, lactic acid 1.2 & recheck of bp 96/52.  Pt asymptomatic & denies any pain/dizziness.[CR1.1]      Revision History        User Key Date/Time User Provider Type Action    > CR1.1 9/8/2018  4:50 PM Alistair  Mary Kate RESTREPO RN Registered Nurse Sign            Progress Notes by Emeli Reyes MD at 9/8/2018  3:42 PM     Author:  Emeli Reyes MD Service:  Hospitalist Author Type:  Physician    Filed:  9/8/2018  3:56 PM Date of Service:  9/8/2018  3:42 PM Creation Time:  9/8/2018  3:42 PM    Status:  Signed :  Emeli Reyes MD (Physician)         [facility]    Hospitalist Progress Note[PK1.1]    Assessment & Plan[PK1.2]   Efrem Ramos is a 73 year old male with PMH of Afib- on Eliquis, CVA, HLP, MGUS, 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     Diarrhea  - h/o chronic diarrhea  - last colonoscopy in 8/2013 was incomplete due to a redundant colon.  Random biopsies were obtained for diarrhea and he was found to have collagenous colitis; he had  a virtual colonoscopy after his last colonoscopy in 9/2013 which was normal except for scattered sigmoid diverticulosis- as per notes  - no associated fever, no abd pain, no blood in stool  - WBCs elevated up to 18.4, but gradually improving   - CT abdomen and US abdomen- no acute pathology  - C diff negative; stool sample for enteric bacteria and viruses- negative  - GI consult appreciated  -Continue with Budesonide daily for 8 weeks and scheduled Imodium QID  - diet as tolerated  - iv fluids 1/2NS with KCl at 100cc/h, stop IVF once diarrhea improves     Dysphagia to solid food   Voice change  - reported difficulty swallowing solids and pills and change of voice for the last 2 weeks  - has h/o vocal cord cancer and is believed to be in remission  - SLP- rec DD2 with thin liquids, aspiration precaution, status post video swallow earlier today, does show some silent aspiration with thin liquid, see below for details  -Will continue to need outpatient speech evaluation and treatment, s/p  EGD 9/8/18-.Minimal schatzki's ring :  Biopsied for  disruption  Tertiary esophageal contractions ?esophageal spasm.Exam otherwise negative.      Hypokalemia   - likely due to diarrhea  - K replacement protocol       Acute kidney injury   - Probably prerenal due to diarrhea  -Currently on IV fluids and renal function now improved.  Continue to monitor     Hypernatremia  - Na 147, improved after IV fluids changed to half normal saline  -Monitor     Elevated liver transaminases    - Total bilirubin is 1.2, ALT 97--100, --146, alkaline phosphatase 313 and lipase 294  - Upper quadrant ultrasound negative  - stop PTA statin for now  - avoid hepatotoxic drugs  -Continue to monitor     Leukocytosis  - cause?- stress?, dehydration?others, improved today, patient has been afebrile  - UA negative, CXR- no acute infiltrates, CT abd- no acute pathology  - c diff and stool cultures negative  - continue to monitor     Lethargy/weakness  - as per son- pt is more lethargic in the last few weeks- possible related to dehydration/diarrhea and electrolyte imbalance  -Head CT negative, PT OT recommending TCU,      Chronic atrial fibrillation  Hypertension  - rate controlled on PTA Toprol XL 12.5 mg po qdaily  - continue PTA apixaban      Hypercholesterolemia   - hold PTA Lipitor for now given elevated LFTs      MGUS which is being monitored by his oncologist    Pain assessment :[PK1.1]  Current Pain Score 9/8/2018   Patient currently in pain? denies   Pain score (0-10) -   Pain location -   Pain descriptors -[PK1.2]   Efrem s pain level was assessed and he currently denies pain.      DVT Prophylaxis: Eliquis  Code Status: Full code    Disposition: Expected discharge in 1-2 days once diarrhea improves, renal function/electrolytes remained stable off IV fluids and clearance by GI.[PK1.1]    Emeli Reyes[PK1.2], MD  Text page (7am - 6pm)[PK1.1]    Interval History[PK1.2]   Patient  Had EGD today , see procedure report for details , had terrible night , still having liquidy diarrhea  which kept him awake throughout night  -Data reviewed today: I reviewed all new labs and imaging results over the last 24 hours. I personally reviewed the Barium swallow evaluation image(s) showing See below.[PK1.1]    Physical Exam   Temp: 97.6  F (36.4  C) Temp src: Oral BP: 95/64   Heart Rate: 60 Resp: 11 SpO2: 97 % O2 Device: None (Room air)    Vitals:    09/05/18 1535 09/06/18 0644 09/08/18 0630   Weight: 78 kg (172 lb) 80.9 kg (178 lb 5.6 oz) 81.4 kg (179 lb 6.4 oz)[PK1.2]     Vital Signs with Ranges[PK1.1]  Temp:  [97.4  F (36.3  C)-98.8  F (37.1  C)] 97.6  F (36.4  C)  Heart Rate:  [57-90] 60  Resp:  [11-20] 11  BP: ()/(58-85) 95/64  SpO2:  [94 %-98 %] 97 %  I/O last 3 completed shifts:  In: 320 [P.O.:320]  Out: -[PK1.2]     Exam:  Constitutional: Awake, alert and no distress. Appears comfortable  Head: Normocephalic. No masses, lesions, tenderness or abnormalities  ENT: no neck nodes or sinus tenderness  Cardiovascular: Irregular but rate controlled, 2+ murmurs, no rub or gallop no JVD  Respiratory: Normal WOB, no wheezes or crackles   Gastrointestinal: Abdomen soft, non-tender. BS normal. No masses, organomegaly  : Deferred  Extremities: No edema , no clubbing /cyanosis   Skin: Warm and dry, no rash[PK1.1]        Medications     IV infusion builder WITH additives 100 mL/hr at 09/08/18 1309     - MEDICATION INSTRUCTIONS -       - MEDICATION INSTRUCTIONS -         apixaban ANTICOAGULANT  5 mg Oral BID     aspirin chewable tablet 81 mg  81 mg Oral QPM     budesonide  9 mg Oral Daily     [START ON 9/9/2018] cyanocobalamin  2,000 mcg Oral Q7 Days     gabapentin  600 mg Oral QPM     loperamide  4 mg Oral 4x daily     metoprolol succinate  12.5 mg Oral QPM     sodium chloride (PF)  3 mL Intracatheter Q8H       Data     Recent Labs  Lab 09/08/18  0839 09/07/18  0835 09/06/18  1328 09/06/18  0602  09/05/18  1628   WBC  --  12.7*  --  18.4*  --  19.9*   HGB  --  11.7*  --  11.9*  --  14.4   MCV  --  95  --   94  --  92   PLT  --  262  --  272  --  366    143  --  147*  --  138   POTASSIUM 3.9 3.4 3.4 3.3*  < > 2.3*   CHLORIDE 115* 113*  --  121*  --  104   CO2 18* 20  --  20  --  23   BUN 13 13  --  27  --  38*   CR 0.85 1.06  --  1.20  --  1.66*   ANIONGAP 8 10  --  6  --  11   ULISSES 7.2* 7.0*  --  7.2*  --  8.5   * 77  --  87  --  93   ALBUMIN 2.4* 2.4*  --  2.5*  --  3.2*   PROTTOTAL 6.4* 6.3*  --  6.7*  --  8.5   BILITOTAL 1.5* 1.3  --  1.2  --  1.2   ALKPHOS 283* 312*  --  323*  --  313*   * 131*  --  100*  --  97*   * 188*  --  146*  --  118*   LIPASE  --   --   --   --   --  294   TROPI  --   --   --   --   --  0.027   < > = values in this interval not displayed.[PK1.2]    Imaging:[PK1.1]  No results found for this or any previous visit (from the past 24 hour(s)).[PK1.2]     Revision History        User Key Date/Time User Provider Type Action    > PK1.2 9/8/2018  3:56 PM Emeli Reyes MD Physician Sign     PK1.1 9/8/2018  3:42 PM Emeli Reyes MD Physician             Progress Notes by Xander Marie MD at 9/8/2018  8:54 AM     Author:  Xander Marie MD Service:  Gastroenterology Author Type:  Physician    Filed:  9/8/2018  8:59 AM Date of Service:  9/8/2018  8:54 AM Creation Time:  9/8/2018  8:54 AM    Status:  Signed :  Xander Marie MD (Physician)         [facility]  Gastroenterology Progress note      Assessment        Diarrhea:  Not clearly improved.  Will continue to observe this weekend.  May be worth colonoscopy with biopsies next week if no better by then.  Dysphagia:  Swallow study suggests both oropharyngeal problems as well as ? Obstruction in distal esophagus:  Will schedule EGD  Abnormal LFT's:  CT and ultrasound unrevealing.  None of his meds are hepatotoxic.  Statins rarely cause LFT's more than 3X normal.      Plan    EGD today  Will order hepatitis serologies, autoimmune markers.  Follow for now.      Interval  "History     5 watery stools last night.  Some aspiration as well as pooling of barium in hypopharynx on swallow study.       Physical Exam    BP 98/61 (BP Location: Left arm)  Temp 97.6  F (36.4  C) (Oral)  Resp 18  Ht 1.727 m (5' 8\")  Wt 81.4 kg (179 lb 6.4 oz)  SpO2 96%  BMI 27.28 kg/m2  Temp (24hrs), Av.9  F (36.6  C), Min:97.4  F (36.3  C), Max:98.8  F (37.1  C)    Patient Vitals for the past 72 hrs:   Weight   18 0630 81.4 kg (179 lb 6.4 oz)   18 0644 80.9 kg (178 lb 5.6 oz)   18 1535 78 kg (172 lb)       Intake/Output Summary (Last 24 hours) at 18 0854  Last data filed at 18 1923   Gross per 24 hour   Intake              560 ml   Output                0 ml   Net              560 ml         Constitutional: NAD, comfortable  Cardiovascular: RRR, normal S1, S2  1/6 PRAFUL heard at LSB   Respiratory: CTAB  Abdomen: soft, non-tender, nondistended, BS + no hsm      Additional Comments     ROS, FH, SH: See initial GI consult for details.    Lab Data     Recent Labs   Lab Test  18   0835  18   0602  18   1628   17   0950   16   1733   09/04/15   1040   WBC  12.7*  18.4*  19.9*   < >  10.9   < >  14.9*   < >  9.4   HGB  11.7*  11.9*  14.4   < >  13.0*   < >  11.8*   < >  12.9*   MCV  95  94  92   < >  93   < >  94   < >  99   PLT  262  272  366   < >  302   < >  328   < >  350   INR   --    --    --    --   1.02   --   1.17*   --   1.02    < > = values in this interval not displayed.     Recent Labs   Lab Test  18   0835  18   1328  18   0602   18   1628   NA  143   --   147*   --   138   POTASSIUM  3.4  3.4  3.3*   < >  2.3*   CHLORIDE  113*   --   121*   --   104   CO2  20   --   20   --   23   BUN  13   --   27   --   38*   CR  1.06   --   1.20   --   1.66*   ANIONGAP  10   --   6   --   11   ULISSES  7.0*   --   7.2*   --   8.5    < > = values in this interval not displayed.     Recent Labs   Lab Test  18   0835  18   " 0602  09/05/18   1905  09/05/18   1628   ALBUMIN  2.4*  2.5*   --   3.2*   BILITOTAL  1.3  1.2   --   1.2   ALT  131*  100*   --   97*   AST  188*  146*   --   118*   ALKPHOS  312*  323*   --   313*   PROTEIN   --    --   10*   --    LIPASE   --    --    --   294               S. Gino Marie MD  [facility]  Office: [phone] call if needed after 5PM or on weekends  Cell: [phone], not available after 5PM at this number[SF1.1]     Revision History        User Key Date/Time User Provider Type Action    > SF1.1 9/8/2018  8:59 AM Xander Marie MD Physician Sign            Progress Notes by Emeli Reyes MD at 9/7/2018 11:12 AM     Author:  Emeli Reyes MD Service:  Hospitalist Author Type:  Physician    Filed:  9/7/2018  2:57 PM Date of Service:  9/7/2018 11:12 AM Creation Time:  9/7/2018 11:12 AM    Status:  Signed :  Emeli Reyes MD (Physician)         [facility]    Hospitalist Progress Note[PK1.1]    Assessment & Plan[PK1.2]   Efrem Ramos is a 73 year old male with PMH of Afib- on Eliquis, CVA, HLP, MGUS, 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     Diarrhea  - h/o chronic diarrhea  - last colonoscopy in 8/2013 was incomplete due to a redundant colon.  Random biopsies were obtained for diarrhea and he was found to have collagenous colitis; he had  a virtual colonoscopy after his last colonoscopy in 9/2013 which was normal except for scattered sigmoid diverticulosis- as per notes  - no associated fever, no abd pain, no blood in stool  - WBCs elevated up to 18.4, but improved today to 12.7  - CT abdomen and US abdomen- no acute pathology  - C diff negative; stool sample for enteric bacteria and viruses- negative  - GI consult appreciated  -Continue with Budesonide daily for 8 weeks and scheduled Imodium QID  - diet as tolerated  - iv  fluids 1/2NS with KCl at 100cc/h     Dysphagia to solid food   Voice change  - reported difficulty swallowing solids and pills and change of voice for the last 2 weeks  - has h/o vocal cord cancer and is believed to be in remission  - SLP- rec DD2 with thin liquids, aspiration precaution, status post video swallow earlier today, does show some silent aspiration with thin liquid, see below for details  -Will continue to need outpatient speech evaluation, also awaiting further recommendation from GI about timing for possible EGD.      Hypokalemia   - likely due to diarrhea  - K replacement protocol       Acute kidney injury   - Probably prerenal due to diarrhea  -Currently on IV fluids and gradually improving renal function.  Continue to monitor     Hypernatremia  - Na 147, improved after IV fluids changed to half normal saline  -Monitor     Elevated liver transaminases    - Total bilirubin is 1.2, ALT 97--100, --146, alkaline phosphatase 313 and lipase 294  - Upper quadrant ultrasound negative  - stop PTA statin for now  - avoid hepatotoxic drugs  -Continue to monitor     Leukocytosis  - cause?- stress?, dehydration?others, improved today, patient has been afebrile  - UA negative, CXR- no acute infiltrates, CT abd- no acute pathology  - c diff and stool cultures negative  - continue to monitor     Lethargy/weakness  - as per son- pt is more lethargic in the last few weeks- possible related to dehydration/diarrhea and electrolyte imbalance  -Head CT negative, PT OT recommending TCU,      Chronic atrial fibrillation  Hypertension  - rate controlled on PTA Toprol XL 12.5 mg po qdaily  - continue PTA apixaban      Hypercholesterolemia   - hold PTA Lipitor for now given elevated LFTs      MGUS which is being monitored by his oncologist[PK1.3]    Pain assessment :[PK1.1]  Current Pain Score 9/7/2018   Patient currently in pain? denies   Pain score (0-10) -   Pain location -   Pain descriptors -[PK1.2]   Efrem s pain  level was assessed and he currently denies pain.[PK1.3]      DVT Prophylaxis:[PK1.1] Eliquis[PK1.3]  Code Status:[PK1.1] Full code[PK1.3]    Disposition: Expected discharge in[PK1.1] 1-2[PK1.3] days once[PK1.1] diarrhea improves, renal function/electrolytes remained stable off IV fluids and clearance by GI[PK1.3].[PK1.1]    Emeli Reyes[PK1.2], MD  Text page (7am - 6pm)[PK1.1]    Interval History[PK1.2]   Patient was just back from swallow evaluation had just tried some medicine when I was going to his room and had a coughing bout at the time of my evaluation.  Started getting better before I left.  Reports diarrhea is still there but getting better and he is almost back to his baseline.  Also reports generalized weakness[PK1.3]    -Data reviewed today: I reviewed all new labs and imaging results over the last 24 hours. I personally reviewed[PK1.1] the Barium swallow evaluation image(s) showing See below[PK1.3].[PK1.1]    Physical Exam   Temp: 98.4  F (36.9  C) Temp src: Oral BP: 90/54   Heart Rate: 67 Resp: 16 SpO2: 95 % O2 Device: None (Room air)    Vitals:    09/05/18 1535 09/06/18 0644   Weight: 78 kg (172 lb) 80.9 kg (178 lb 5.6 oz)[PK1.2]     Vital Signs with Ranges[PK1.1]  Temp:  [98.4  F (36.9  C)-98.5  F (36.9  C)] 98.4  F (36.9  C)  Heart Rate:  [61-75] 67  Resp:  [12-16] 16  BP: ()/(52-66) 90/54  SpO2:  [95 %-98 %] 95 %  I/O last 3 completed shifts:  In: 971 [I.V.:971]  Out: -[PK1.2]     Exam:  Constitutional: Awake, alert and no distress. Appears comfortable  Head: Normocephalic. No masses, lesions, tenderness or abnormalities  ENT: no neck nodes or sinus tenderness  Cardiovascular:[PK1.1] Irregular but rate controlled, 2+[PK1.3] murmurs, no rub or gallop no JVD  Respiratory:[PK1.1] Normal[PK1.3] WOB,[PK1.1] no[PK1.3] wheezes or crackles   Gastrointestinal: Abdomen soft, non-tender. BS normal. No masses, organomegaly  : Deferred  Extremities:[PK1.1] No[PK1.3] edema , no clubbing /cyanosis   Skin:  Warm and dry, no rash[PK1.1]        Medications     IV infusion builder WITH additives 100 mL/hr at 09/07/18 0714     - MEDICATION INSTRUCTIONS -         apixaban ANTICOAGULANT  5 mg Oral BID     aspirin chewable tablet 81 mg  81 mg Oral QPM     budesonide  9 mg Oral Daily     [START ON 9/9/2018] cyanocobalamin  2,000 mcg Oral Q7 Days     gabapentin  600 mg Oral QPM     loperamide  4 mg Oral 4x daily     metoprolol succinate  12.5 mg Oral QPM     sodium chloride (PF)  3 mL Intracatheter Q8H       Data     Recent Labs  Lab 09/07/18  0835 09/06/18  1328 09/06/18  0602  09/05/18  1628   WBC 12.7*  --  18.4*  --  19.9*   HGB 11.7*  --  11.9*  --  14.4   MCV 95  --  94  --  92     --  272  --  366     --  147*  --  138   POTASSIUM 3.4 3.4 3.3*  < > 2.3*   CHLORIDE 113*  --  121*  --  104   CO2 20  --  20  --  23   BUN 13  --  27  --  38*   CR 1.06  --  1.20  --  1.66*   ANIONGAP 10  --  6  --  11   ULISSES 7.0*  --  7.2*  --  8.5   GLC 77  --  87  --  93   ALBUMIN 2.4*  --  2.5*  --  3.2*   PROTTOTAL 6.3*  --  6.7*  --  8.5   BILITOTAL 1.3  --  1.2  --  1.2   ALKPHOS 312*  --  323*  --  313*   *  --  100*  --  97*   *  --  146*  --  118*   LIPASE  --   --   --   --  294   TROPI  --   --   --   --  0.027   < > = values in this interval not displayed.[PK1.2]    Imaging:[PK1.1]  Recent Results (from the past 24 hour(s))   CT Head w/o Contrast    Narrative    CT SCAN OF THE HEAD WITHOUT CONTRAST   9/6/2018 5:16 PM     HISTORY:  Weakness. Rule out stroke.     TECHNIQUE:  Axial images of the head and coronal reformations without  IV contrast material.  Radiation dose for this scan was reduced using  automated exposure control, adjustment of the mA and/or kV according  to patient size, or iterative reconstruction technique.    COMPARISON: None.    FINDINGS: There has been a previous left frontal tim hole. The  calvarium is otherwise intact. Vascular calcifications are seen at the  skull base. Mild to  moderate cerebral atrophy is present. Minimal  nonspecific white matter changes are present. There is a low density  in the left corona radiata which is probably an old lacunar infarct,  although it is difficult to exclude a subacute infarct. There is also  an old left basal ganglia infarct. There is no evidence for  intracranial hemorrhage, mass effect, acute infarct, or skull  fracture.      Impression    IMPRESSION: Chronic changes. No evidence for intracranial hemorrhage  or any acute process. If clinically indicated, MRI of the brain  without and with contrast might be more sensitive in evaluating for a  subtle acute infarct.    JOSE F STEPHENS MD   XR Video Swallow w/o Esophagram    Narrative    VIDEO SWALLOWING EVALUATION   9/7/2018 9:29 AM     HISTORY: Status post CA of the VC and stroke. Globus sensation with  swallowing.     COMPARISON: 6/30/2015.    FLUOROSCOPY TIME: 4.1 minutes. 11 cine video clips were obtained.     FINDINGS:    Solid: Minimal residue. No aspiration or penetration.    Pill: Difficulty with oral transit. No aspiration. There was some  esophageal dysmotility. There was failure of relaxation of the lower  esophageal sphincter during the exam. The liquid readily passed the  lower esophageal sphincter but the pill did not pass during the exam.    Pudding: Mild residue. No aspiration or penetration.    Honey: Not tested.    Nectar: Not tested.    Thin: Multiple swallows with penetration. One swallow did produce  aspiration without cough. There was moderate residue. There was  premature spillage into both the vallecula and piriform sinuses. The  penetration improved with chin tuck maneuver.    This study only includes the cervical esophagus.  For more detailed  information, please see speech therapy report.    JOSE F STEPHENS MD[PK1.2]        Revision History        User Key Date/Time User Provider Type Action    > PK1.3 9/7/2018  2:57 PM Emeli Reyes MD Physician Sign     PK1.2 9/7/2018 11:13 AM  Emeli Reyes MD Physician      PK1.1 9/7/2018 11:12 AM Emeli Reyes MD Physician             Progress Notes by Kev Rodriguez PT at 9/7/2018  2:28 PM     Author:  Kev Rodriguez PT Service:  (none) Author Type:  Physical Therapist    Filed:  9/7/2018  2:28 PM Date of Service:  9/7/2018  2:28 PM Creation Time:  9/7/2018  2:28 PM    Status:  Signed :  Kev Rodriguez PT (Physical Therapist)            09/07/18 1400   Quick Adds   Type of Visit Initial PT Evaluation   Living Environment   Lives With spouse   Living Arrangements house   Home Accessibility stairs to enter home;bed and bath on same level;stairs within home   Number of Stairs to Enter Home 6   Number of Stairs Within Home 12   Stair Railings at Home outside, present on right side   Living Environment Comment Lives with spouse, pt and spouse acted as caretaker for eachother   Self-Care   Usual Activity Tolerance moderate   Current Activity Tolerance fair   Regular Exercise no   Equipment Currently Used at Home cane, straight;walker, rolling;raised toilet   Functional Level Prior   Ambulation 1-->assistive equipment   Transferring 1-->assistive equipment   Toileting 0-->independent   Bathing 1-->assistive equipment   Dressing 0-->independent   Eating 0-->independent   Fall history within last six months yes   Number of times patient has fallen within last six months 2   Which of the above functional risks had a recent onset or change? 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   Precautions/Limitations fall precautions   Cognitive Status Examination   Orientation orientation to person, place and time   Level of Consciousness alert   Posture    Posture Forward head position;Kyphosis   Range of Motion (ROM)   ROM Comment UE LE WNL   Strength   Strength Comments B knee ext 5/5; B hip flex 4/5; B DF 5/5   Bed Mobility   Bed Mobility Comments SBA supine<>sit   Transfer Skills   Transfer Comments CGA sit<>stand to no AD. Requires cues to hand placement   Gait   Gait Comments SBA with FWW, decrease pace, step length, NBOS; CGA with SEC, R lean   Balance   Balance Comments SBA static sitting and standing balance   Sensory Examination   Sensory Perception Comments BLE WNL   General Therapy Interventions   Planned Therapy Interventions balance training;gait training;progressive activity/exercise;home program guidelines;risk factor education;transfer training;strengthening;neuromuscular re-education   Clinical Impression   Criteria for Skilled Therapeutic Intervention yes, treatment indicated   PT Diagnosis Impaired gait and mobility   Influenced by the following impairments Decreased activity tolerance, fatigue   Functional limitations due to impairments Decrease safety and IND with mobility   Clinical Presentation Stable/Uncomplicated   Clinical Presentation Rationale Medically stable   Clinical Decision Making (Complexity) Low complexity   Therapy Frequency` daily   Predicted Duration of Therapy Intervention (days/wks) 3 Days   Anticipated Equipment Needs at Discharge other (see comments)  (Has all DME)   Anticipated Discharge Disposition Transitional Care Facility   Risk & Benefits of therapy have been explained Yes   Patient, Family & other staff in agreement with plan of care Yes   Clinical Impression Comments Pt functioning below baseline, unable to safety return home as spouse is  "unable to provide full assistance   [facility]-P[facility] TM \"6 Clicks\"   2016, Trustees of [facility], under license to [employer].  All rights reserved.   6 Clicks Short Forms Basic Mobility Inpatient Short Form   [facility] AM-PAC  \"6 Clicks\" V.2 Basic Mobility Inpatient Short Form   1. Turning from your back to your side while in a flat bed without using bedrails? 4 - None   2. Moving from lying on your back to sitting on the side of a flat bed without using bedrails? 3 - A Little   3. Moving to and from a bed to a chair (including a wheelchair)? 3 - A Little   4. Standing up from a chair using your arms (e.g., wheelchair, or bedside chair)? 3 - A Little   5. To walk in hospital room? 3 - A Little   6. Climbing 3-5 steps with a railing? 2 - A Lot   Basic Mobility Raw Score (Score out of 24.Lower scores equate to lower levels of function) 18   Total Evaluation Time   Total Evaluation Time (Minutes) 7[JS1.1]        Revision History        User Key Date/Time User Provider Type Action    > JS1.1 9/7/2018  2:28 PM Kev Rodriguez, PT Physical Therapist Sign            Progress Notes by Tyra Fierro SLP at 9/7/2018 10:04 AM     Author:  Tyra Fierro SLP Service:  (none) Author Type:  Speech Therapist    Filed:  9/7/2018 10:04 AM Date of Service:  9/7/2018 10:04 AM Creation Time:  9/7/2018 10:04 AM    Status:  Signed :  Tyra Fierro SLP (Speech Therapist)         VIDEO SWALLOW STUDY       09/07/18 0900   General Information   Onset Date 09/05/18   Start of Care Date 09/07/18   Referring Physician Dr. Zazueta   Patient Profile Review/OT: Additional Occupational Profile Info See Profile for full history and prior level of function   Patient/Family Goals Statement To swallow pills better   Swallowing Evaluation Videofluoroscopic evaluation   Behaviorial Observations Alert   Mode of current nutrition Oral diet   Type of oral diet Dysphagia diet level 1;Thin liquid   Comments Efrem Ramos " is a 73 year old male with multiple medical problems 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  He has MGUS which is being followed by his oncologist.  His atrial fibrillation and history of a stroke.  He is anticoagulated with apixaban.  The patient says that he has had colitis throughout his life and he was originally told he had ulcerative colitis.  However later in life he was told that it was not ulcerative colitis but he cannot recall the diagnosis.  He says he has 4-5 loose stools per day on an ongoing basis.  He does not have blood in his stool.  Over the past 2 weeks he has had much more frequent bowel movements.  He is also had some abdominal distention and crampy pain with this.  He has not seen any blood in his stool.  He also says that he has had dysphagia to solid food for a couple of weeks.  He has been taking liquids only.  He has been having trouble swallowing his pills.  He has not had any fever, chills, night sweats, cold, cough, chest pain, shortness of breath.  He has not had any vomiting. Patient reports hx of dysphagia since the 1990s when he had encephalopathy and needed to rehabilitate his swallowing.     VFSS Eval: Thin Liquid Texture Trial   Mode of Presentation, Thin Liquid cup;self-fed   Preparatory Phase WFL   Oral Phase, Thin Liquid Premature pharyngeal entry   Pharyngeal Phase, Thin Liquid Residue in valleculae;Residue in pyriform sinus   Rosenbek's Penetration Aspiration Scale: Thin Liquid Trial Results 6 - contrast passes glottis, no subglottic residue remains (aspiration)   Response to Aspiration productive volitional cough following clinician cue   Successful Strategies Trialed During Procedure, Thin Liquid chin tuck   Diagnostic Statement Transient penetration for 3/5 trials, aspiration with delayed effective cough 1/5.     VFSS Eval: Puree Solid Texture Trial   Mode of Presentation, Puree spoon;self-fed   Preparatory Phase WFL   Oral Phase,  Puree Premature pharyngeal entry   Pharyngeal Phase, Puree Residue in valleculae;Residue in pyriform sinus   Rosenbek's Penetration Aspiration Scale: Puree Food Trial Results 1 - no aspiration, contrast does not enter airway   Diagnostic Statement Moderate pharyngeal residue   VFSS Eval: Solid Food Texture Trial   Mode of Presentation, Solid spoon;fed by clinician   Preparatory Phase WFL   Oral Phase, Solid Premature pharyngeal entry   Pharyngeal Phase, Solid Residue in valleculae;Residue in pyriform sinus   Rosenbek's Penetration Aspiration Scale: Solid Food Trial Results 1 - no aspiration, contrast does not enter airway   Diagnostic Statement Moderate pharyngeal residue   Esophageal Phase of Swallow   Patient reports or presents with symptoms of esophageal dysphagia Yes   Esophageal comments On video 2015 noted to have prominent cricopharyngeus muscle.    General Therapy Interventions   Planned Therapy Interventions Dysphagia Treatment   Dysphagia treatment Modified diet education;Instruction of safe swallow strategies   Swallow Eval: Clinical Impressions   Skilled Criteria for Therapy Intervention Skilled criteria met.  Treatment indicated.   Functional Assessment Scale (FAS) 3   Dysphagia Outcome Severity Scale (GONZALEZ) Level 3 - GONZALEZ   Treatment Diagnosis Moderate oropharyngeal dysphagia, distal esophageal pill retention   Diet texture recommendations Thin liquids;Dysphagia diet level 2   Recommended Feeding/Eating Techniques hard swallow w/ each bite or sip;maintain upright posture during/after eating for 30 mins;no straws;small sips/bites;tuck chin during every swallow  (Double swallow)   Demonstrates Need for Referral to Another Service dietitian   Therapy Frequency 5 times/wk   Predicted Duration of Therapy Intervention (days/wks) 1 week   Anticipated Discharge Disposition home w/ outpatient services  (Pending outcome)   Risks and Benefits of Treatment have been explained. Yes   Patient, family and/or staff  "in agreement with Plan of Care Yes   Clinical Impression Comments Patient presents with both oropharyngeal and sx of esophageal dysphagia.  Patient exhibited delayed swallow response with premature pharyngeal entry of liquids, moderate valleculae and pyriform sinus residue with all consistencies, and aspiration event x1 with thin liquid wash with delayed, strong cough response.  Patient exhibited good airway protection with use of chin tuck and DOUBLE SWALLOW.  Cricopharyngeal prominence present and stable from previous video swallow, not impeding solid foods or pills.  However, there was retention of the 13 mm diameter barium tablet at the GE junction across several liquid wash trials after the pill swallow during esophageal follow through, thus, recommend GI consideration for EGD as appropriate. Recommend dysphagia diet level 2 with thin liquids with CHIN TUCK and DOUBLE SWALLOW, eating only when alert and up to chair.    Total Evaluation Time   Total Evaluation Time (Minutes) 60[RL1.1]        Revision History        User Key Date/Time User Provider Type Action    > RL1.1 2018 10:04 AM Tyra Fierro, SLP Speech Therapist Sign            Progress Notes by Tonya Abernathy PA-C at 2018  9:19 AM     Author:  Tonya Abernathy PA-C Service:  Gastroenterology Author Type:  Physician Assistant - C    Filed:  2018  9:22 AM Date of Service:  2018  9:19 AM Creation Time:  2018  9:19 AM    Status:  Signed :  Tonya Abernathy PA-C (Physician Assistant - C)         [facility]  [facility]  Gastroenterology Progress note    Interval History:      Pt still with loose stool but with some improvement.  Video swallow study this am.      Vital Signs:      BP 90/54 (BP Location: Left arm)  Temp 98.4  F (36.9  C) (Oral)  Resp 16  Ht 1.727 m (5' 8\")  Wt 80.9 kg (178 lb 5.6 oz)  SpO2 95%  BMI 27.12 kg/m2  Temp (24hrs), Av.5  F (36.9  C), Min:98.4  F (36.9 "  C), Max:98.5  F (36.9  C)    Patient Vitals for the past 72 hrs:   Weight   09/06/18 0644 80.9 kg (178 lb 5.6 oz)   09/05/18 1535 78 kg (172 lb)       Intake/Output Summary (Last 24 hours) at 09/07/18 0919  Last data filed at 09/06/18 2149   Gross per 24 hour   Intake              971 ml   Output                0 ml   Net              971 ml         Constitutional: NAD, comfortable  Cardiovascular: RRR, normal S1, S2   Respiratory: CTAB  Abdomen: soft, non-tender, nondistended.    Additional Comments:  ROS, FH, SH: See initial GI consult for details.    Laboratory Data:  Recent Labs   Lab Test  09/07/18   0835  09/06/18   0602  09/05/18   1628   09/05/17   0950   08/01/16   1733   09/04/15   1040   WBC  12.7*  18.4*  19.9*   < >  10.9   < >  14.9*   < >  9.4   HGB  11.7*  11.9*  14.4   < >  13.0*   < >  11.8*   < >  12.9*   MCV  95  94  92   < >  93   < >  94   < >  99   PLT  262  272  366   < >  302   < >  328   < >  350   INR   --    --    --    --   1.02   --   1.17*   --   1.02    < > = values in this interval not displayed.     Recent Labs   Lab Test  09/06/18   1328  09/06/18   0602  09/05/18   2240  09/05/18   1628  03/21/18   1303   NA   --   147*   --   138  141   POTASSIUM  3.4  3.3*  2.8*  2.3*  3.9   CHLORIDE   --   121*   --   104  108   CO2   --   20   --   23  28   BUN   --   27   --   38*  8   CR   --   1.20   --   1.66*  0.82   ANIONGAP   --   6   --   11  5   ULISSES   --   7.2*   --   8.5  8.3*     Recent Labs   Lab Test  09/06/18   0602  09/05/18   1905  09/05/18   1628  03/21/18   1303   06/14/16   1252   ALBUMIN  2.5*   --   3.2*  3.0*   < >  2.8*   BILITOTAL  1.2   --   1.2  0.9   < >  0.7   DBIL  0.5*   --    --    --    --   0.3*   ALT  100*   --   97*  14   < >  46   AST  146*   --   118*  19   < >  46*   ALKPHOS  323*   --   313*  125   < >  102   PROTEIN   --   10*   --    --    --    --    LIPASE   --    --   294   --    --    --     < > = values in this interval not displayed.          Assessment:    Chronic diarrhea secondary to collagenous colitis.  Started on budesonide yesterday.  Some improvement.  Dysphagia.  Currently being evaluated with video swallow study by speech.  Can consider EGD for further evaluation if warranted.  Plan:    -  Continue budesonide for total of 8 weeks.  -  Video swallow study results pending.  EGD if needed.                CHANDLER Castillo  [facility]  Office:  [phone] call if needed after 5PM  Cell:  [phone], not available after 5PM at this number[[email or web address]]       Revision History        User Key Date/Time User Provider Type Action    > KW1.1 9/7/2018  9:22 AM Tonya Abernathy PA-C Physician Assistant - C Sign                     Procedure Notes      Procedures by Xander Marie MD at 9/8/2018 12:07 PM     Author:  Xander Marie MD Service:  Gastroenterology Author Type:  Physician    Filed:  9/8/2018 12:07 PM Date of Service:  9/8/2018 12:07 PM Creation Time:  9/8/2018 12:06 PM    Status:  Signed :  Xander Marie MD (Physician)         EGD:  Minimal schatzki's ring :  Biopsied for disruption  Tertiary esophageal contractions ?esophageal spasm  Exam otherwise negative.  See Provation procedure note.   Gino Marie MD  [phone]  After 5 pm or on weekends  [phone][SF1.1]     Revision History        User Key Date/Time User Provider Type Action    > SF1.1 9/8/2018 12:07 PM Xander Marie MD Physician Sign            Procedures by Xander Marie MD at 9/8/2018 11:24 AM     Author:  Xander Marie MD Service:  Gastroenterology Author Type:  Physician    Filed:  9/8/2018 11:24 AM Date of Service:  9/8/2018 11:24 AM Creation Time:  9/8/2018 11:23 AM    Status:  Signed :  Xander Marie MD (Physician)         INPATIENT PRE-PROCEDURE NOTE    CHIEF COMPLAINT / REASON FOR PROCEDURE:  dysphagia    History and Physical Reviewed: on chart-<30 days old;  updated within 7 days.    PRE-SEDATION ASSESSMENT:   Lung Exam: normal  Heart Exam: normal  Mallampati Airway Classification: Class I (visualization of the soft palate, fauces, uvula, anterior and posterior pillars)  Previous reaction to anesthesia/sedation: NO  Sedation plan based on assessment:Moderate (conscious) sedation    Comment(s):      ASA Classification: 2 - Mild systemic disease      IMPRESSION:  Dysphagia.  Question of distal esophageal obstruction on swallow evaluation    Plan:  EGD      LU Marie MD  [facility]  Office: [phone]  Pager:  [phone] 8- 5pm on Monday-Thursday, 8-4pm Friday[SF1.1]     Revision History        User Key Date/Time User Provider Type Action    > SF1.1 9/8/2018 11:24 AM Xander Marie MD Physician Sign                     Progress Notes - Therapies (Notes from 09/07/18 through 09/10/18)      Progress Notes by Kev Rodriguez PT at 9/7/2018  2:28 PM     Author:  Kev Rodriguez PT Service:  (none) Author Type:  Physical Therapist    Filed:  9/7/2018  2:28 PM Date of Service:  9/7/2018  2:28 PM Creation Time:  9/7/2018  2:28 PM    Status:  Signed :  Kev Rodriguez PT (Physical Therapist)            09/07/18 1400   Quick Adds   Type of Visit Initial PT Evaluation   Living Environment   Lives With spouse   Living Arrangements house   Home Accessibility stairs to enter home;bed and bath on same level;stairs within home   Number of Stairs to Enter Home 6   Number of Stairs Within Home 12   Stair Railings at Home outside, present on right side   Living Environment Comment Lives with spouse, pt and spouse acted as caretaker for eachother   Self-Care   Usual Activity Tolerance moderate   Current Activity Tolerance fair   Regular Exercise no   Equipment Currently Used at Home cane, straight;walker, rolling;raised toilet   Functional Level Prior   Ambulation 1-->assistive equipment   Transferring 1-->assistive equipment   Toileting 0-->independent    Bathing 1-->assistive equipment   Dressing 0-->independent   Eating 0-->independent   Fall history within last six months yes   Number of times patient has fallen within last six months 2   Which of the above functional risks had a recent onset or change? 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   Precautions/Limitations fall precautions   Cognitive Status Examination   Orientation orientation to person, place and time   Level of Consciousness alert   Posture    Posture Forward head position;Kyphosis   Range of Motion (ROM)   ROM Comment UE LE WNL   Strength   Strength Comments B knee ext 5/5; B hip flex 4/5; B DF 5/5   Bed Mobility   Bed Mobility Comments SBA supine<>sit   Transfer Skills   Transfer Comments CGA sit<>stand to no AD. Requires cues to hand placement   Gait   Gait Comments SBA with FWW, decrease pace, step length, NBOS; CGA with SEC, R lean   Balance   Balance Comments SBA static sitting and standing balance   Sensory Examination   Sensory Perception Comments BLE WNL   General Therapy Interventions   Planned Therapy Interventions balance training;gait training;progressive activity/exercise;home program guidelines;risk factor education;transfer training;strengthening;neuromuscular re-education   Clinical Impression   Criteria for Skilled Therapeutic Intervention yes, treatment indicated   PT Diagnosis Impaired gait and  "mobility   Influenced by the following impairments Decreased activity tolerance, fatigue   Functional limitations due to impairments Decrease safety and IND with mobility   Clinical Presentation Stable/Uncomplicated   Clinical Presentation Rationale Medically stable   Clinical Decision Making (Complexity) Low complexity   Therapy Frequency` daily   Predicted Duration of Therapy Intervention (days/wks) 3 Days   Anticipated Equipment Needs at Discharge other (see comments)  (Has all DME)   Anticipated Discharge Disposition Transitional Care Facility   Risk & Benefits of therapy have been explained Yes   Patient, Family & other staff in agreement with plan of care Yes   Clinical Impression Comments Pt functioning below baseline, unable to safety return home as spouse is unable to provide full assistance   [facility] AM-PAC TM \"6 Clicks\"   2016, Trustees of [facility], under license to [employer].  All rights reserved.   6 Clicks Short Forms Basic Mobility Inpatient Short Form   [facility] AM-PAC  \"6 Clicks\" V.2 Basic Mobility Inpatient Short Form   1. Turning from your back to your side while in a flat bed without using bedrails? 4 - None   2. Moving from lying on your back to sitting on the side of a flat bed without using bedrails? 3 - A Little   3. Moving to and from a bed to a chair (including a wheelchair)? 3 - A Little   4. Standing up from a chair using your arms (e.g., wheelchair, or bedside chair)? 3 - A Little   5. To walk in hospital room? 3 - A Little   6. Climbing 3-5 steps with a railing? 2 - A Lot   Basic Mobility Raw Score (Score out of 24.Lower scores equate to lower levels of function) 18   Total Evaluation Time   Total Evaluation Time (Minutes) 7[JS1.1]        Revision History        User Key Date/Time User Provider Type Action    > JS1.1 9/7/2018  2:28 PM Kev Rodriguez, PT Physical Therapist Sign            Progress Notes by Tyra Fierro, SLP at 9/7/2018 10:04 AM     Author: "  Sri, Tyra, SLP Service:  (none) Author Type:  Speech Therapist    Filed:  9/7/2018 10:04 AM Date of Service:  9/7/2018 10:04 AM Creation Time:  9/7/2018 10:04 AM    Status:  Signed :  Tyra Fierro SLP (Speech Therapist)         VIDEO SWALLOW STUDY       09/07/18 0900   General Information   Onset Date 09/05/18   Start of Care Date 09/07/18   Referring Physician Dr. 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  He has MGUS which is being followed by his oncologist.  His atrial fibrillation and history of a stroke.  He is anticoagulated with apixaban.  The patient says that he has had colitis throughout his life and he was originally told he had ulcerative colitis.  However later in life he was told that it was not ulcerative colitis but he cannot recall the diagnosis.  He says he has 4-5 loose stools per day on an ongoing basis.  He does not have blood in his stool.  Over the past 2 weeks he has had much more frequent bowel movements.  He is also had some abdominal distention and crampy pain with this.  He has not seen any blood in his stool.  He also says that he has had dysphagia to solid food for a couple of weeks.  He has been taking liquids only.  He has been having trouble swallowing his pills.  He has not had any fever, chills, night sweats, cold, cough, chest pain, shortness of breath.  He has not had any vomiting. Patient reports hx of dysphagia since the 1990s when he had encephalopathy and needed to rehabilitate his  swallowing.     VFSS Eval: Thin Liquid Texture Trial   Mode of Presentation, Thin Liquid cup;self-fed   Preparatory Phase WFL   Oral Phase, Thin Liquid Premature pharyngeal entry   Pharyngeal Phase, Thin Liquid Residue in valleculae;Residue in pyriform sinus   Rosenbek's Penetration Aspiration Scale: Thin Liquid Trial Results 6 - contrast passes glottis, no subglottic residue remains (aspiration)   Response to Aspiration productive volitional cough following clinician cue   Successful Strategies Trialed During Procedure, Thin Liquid chin tuck   Diagnostic Statement Transient penetration for 3/5 trials, aspiration with delayed effective cough 1/5.     VFSS Eval: Puree Solid Texture Trial   Mode of Presentation, Puree spoon;self-fed   Preparatory Phase WFL   Oral Phase, Puree Premature pharyngeal entry   Pharyngeal Phase, Puree Residue in valleculae;Residue in pyriform sinus   Rosenbek's Penetration Aspiration Scale: Puree Food Trial Results 1 - no aspiration, contrast does not enter airway   Diagnostic Statement Moderate pharyngeal residue   VFSS Eval: Solid Food Texture Trial   Mode of Presentation, Solid spoon;fed by clinician   Preparatory Phase WFL   Oral Phase, Solid Premature pharyngeal entry   Pharyngeal Phase, Solid Residue in valleculae;Residue in pyriform sinus   Rosenbek's Penetration Aspiration Scale: Solid Food Trial Results 1 - no aspiration, contrast does not enter airway   Diagnostic Statement Moderate pharyngeal residue   Esophageal Phase of Swallow   Patient reports or presents with symptoms of esophageal dysphagia Yes   Esophageal comments On video 2015 noted to have prominent cricopharyngeus muscle.    General Therapy Interventions   Planned Therapy Interventions Dysphagia Treatment   Dysphagia treatment Modified diet education;Instruction of safe swallow strategies   Swallow Eval: Clinical Impressions   Skilled Criteria for Therapy Intervention Skilled criteria met.  Treatment indicated.    Functional Assessment Scale (FAS) 3   Dysphagia Outcome Severity Scale (GONZALEZ) Level 3 - GONZALEZ   Treatment Diagnosis Moderate oropharyngeal dysphagia, distal esophageal pill retention   Diet texture recommendations Thin liquids;Dysphagia diet level 2   Recommended Feeding/Eating Techniques hard swallow w/ each bite or sip;maintain upright posture during/after eating for 30 mins;no straws;small sips/bites;tuck chin during every swallow  (Double swallow)   Demonstrates Need for Referral to Another Service dietitian   Therapy Frequency 5 times/wk   Predicted Duration of Therapy Intervention (days/wks) 1 week   Anticipated Discharge Disposition home w/ outpatient services  (Pending outcome)   Risks and Benefits of Treatment have been explained. Yes   Patient, family and/or staff in agreement with Plan of Care Yes   Clinical Impression Comments Patient presents with both oropharyngeal and sx of esophageal dysphagia.  Patient exhibited delayed swallow response with premature pharyngeal entry of liquids, moderate valleculae and pyriform sinus residue with all consistencies, and aspiration event x1 with thin liquid wash with delayed, strong cough response.  Patient exhibited good airway protection with use of chin tuck and DOUBLE SWALLOW.  Cricopharyngeal prominence present and stable from previous video swallow, not impeding solid foods or pills.  However, there was retention of the 13 mm diameter barium tablet at the GE junction across several liquid wash trials after the pill swallow during esophageal follow through, thus, recommend GI consideration for EGD as appropriate. Recommend dysphagia diet level 2 with thin liquids with CHIN TUCK and DOUBLE SWALLOW, eating only when alert and up to chair.    Total Evaluation Time   Total Evaluation Time (Minutes) 60[RL1.1]        Revision History        User Key Date/Time User Provider Type Action    > RL1.1 9/7/2018 10:04 AM Tyra Fierro SLP Speech Therapist Sign

## 2018-09-05 NOTE — IP AVS SNAPSHOT
MRN:[id number]                      After Visit Summary   9/5/2018    Efrem Ramos    MRN: [id number]           Thank you!     Thank you for choosing [facility] for your care. Our goal is always to provide you with excellent care. Hearing back from our patients is one way we can continue to improve our services. Please take a few minutes to complete the written survey that you may receive in the mail after you visit with us. Thank you!        Patient Information     Date Of Birth          1945        Designated Caregiver       Most Recent Value    Caregiver    Will someone help with your care after discharge? no      About your hospital stay     You were admitted on:  September 5, 2018 You last received care in the:  [place] [address] [facility]    You were discharged on:  September 10, 2018        Reason for your hospital stay       Diarrhea, low potassium , dysphagia, elevated liver function test                  Who to Call     For medical emergencies, please call 911.  For non-urgent questions about your medical care, please call your primary care provider or clinic, [phone]  For questions related to your surgery, please call your surgery clinic        Attending Provider     Provider Specialty    Yosvany Salomon MD Emergency Medicine    Aidan Zazueta MD Internal Medicine       Primary Care Provider Office Phone # Fax #    Danny Patric Paige [phone]      After Care Instructions     Activity - Up with assistive device           Activity - Up with nursing assistance           Advance Diet as Tolerated       Follow this diet upon discharge: Orders Placed This Encounter      Room Service      Combination Diet Dysphagia Diet Level 2: Mechan Altered; Thin Liquids (water, ice chips, juice, milk gelatin, ice cream, etc)  ASpiration precaution            Daily weights       Call Provider for weight gain of more than 2 pounds per day or 5  pounds per week.            Fall precautions           General info for SNF       Length of Stay Estimate: Short Term Care: Estimated # of Days <30  Condition at Discharge: Improving  Level of care:skilled   Rehabilitation Potential: Good  Admission H&P remains valid and up-to-date: Yes  Recent Chemotherapy: N/A  Use Nursing Home Standing Orders: Yes            Intake and output       Every shift            Mantoux instructions       Give two-step Mantoux (PPD) Per Facility Policy Yes                  Follow-up Appointments     Follow Up and recommended labs and tests       Follow up with snf physician.  The following labs/tests are recommended: CBC/CMP.  Follow-up with GI in 2-3 weeks ([facility])                  Your next 10 appointments already scheduled     Sep 19, 2018  1:15 PM CDT   Return Visit with  Oncology Nurse   [facility] ([facility])    [facility]  [address]  [facility] [address]   [phone]            Sep 26, 2018  1:00 PM CDT   Return Visit with Shae Aldana MD   [facility] ([facility])    [facility]  [address]  [facility] [address]   [phone]            Aug 14, 2019  1:30 PM CDT   Return Sleep Patient with Ron Pacheco MD   [facility] ([facility])    [address]  [facility] [address]   [phone]              Additional Services     Occupational Therapy Adult Consult       Evaluate and treat as clinically indicated.    Reason:  Deconditioning            Physical Therapy Adult Consult       Evaluate and treat as clinically indicated.    Reason:  Deconditioning            Speech Language Path Adult Consult       Evaluate and treat as clinically indicated.    Reason:  Dysphagia                  Pending Results     Date and Time Order Name Status Description    9/9/2018 1001 Hepatitis C antibody In  "process     9/9/2018 1001 Hepatitis B surface antigen In process     9/9/2018 1001 Hepatitis A antibody IgM In process     9/8/2018 2342 Mitochondrial M2 Antibody IgG In process     9/8/2018 2342 F Actin EIA with reflex In process     9/8/2018 2342 Anti Nuclear Nimisha IgG by IFA with Reflex In process     9/8/2018 1157 Surgical pathology exam In process     9/8/2018 0902 Hepatitis C antibody In process     9/8/2018 0902 Hepatitis B surface antigen In process     9/8/2018 0902 Hepatitis A antibody IgM In process     9/6/2018 0944 Tissue transglutaminase nimisha IgA and IgG In process     9/5/2018 1933 Blood culture Preliminary     9/5/2018 1933 Blood culture Preliminary             Statement of Approval     Ordered          09/10/18 1135  I have reviewed and agree with all the recommendations and orders detailed in this document.  EFFECTIVE NOW     Approved and electronically signed by:  Emeli Reyes MD             Admission Information     Date & Time Provider Department Dept. Phone    9/5/2018 Aidan Zazueta MD [place] [address] [facility] [phone]      Your Vitals Were     Blood Pressure Pulse Temperature Respirations Height Weight    133/90 (BP Location: Left arm) 81 97.4  F (36.3  C) (Axillary) 16 1.727 m (5' 8\") 84.5 kg (186 lb 3.2 oz)    Pulse Oximetry BMI (Body Mass Index)                90% 28.31 kg/m2          MyChart Information     tabulate gives you secure access to your electronic health record. If you see a primary care provider, you can also send messages to your care team and make appointments. If you have questions, please call your primary care clinic.  If you do not have a primary care provider, please call [phone] and they will assist you.        Care EveryWhere ID     This is your Care EveryWhere ID. This could be used by other organizations to access your [facility] medical records  [id number]        Equal Access to Services     JAMEEL HARRIS AH: Tika loja " Jenniferchetan, wajenda luqadaha, qaybta kaalmada angelique, alfonzo mosleyyair cris. So [facility] [phone].    ATENCIÓN: Si caity palomares, tiene a palomo disposición servicios gratuitos de asistencia lingüística. Shilo al [phone].    We comply with applicable federal civil rights laws and [place] laws. We do not discriminate on the basis of race, color, national origin, age, disability, sex, sexual orientation, or gender identity.               Review of your medicines      START taking        Dose / Directions    budesonide 9 MG 24 hr tablet   Commonly known as:  UCERIS   Used for:  Collagenous colitis        Dose:  9 mg   Start taking on:  9/11/2018   Take 1 tablet (9 mg) by mouth daily   Quantity:  30 tablet   Refills:  1       loperamide 2 MG capsule   Commonly known as:  IMODIUM   Used for:  Diarrhea, unspecified type        Dose:  4 mg   Take 2 capsules (4 mg) by mouth 4 times daily as needed for diarrhea   Quantity:  20 capsule   Refills:  0       simethicone 80 MG chewable tablet   Commonly known as:  MYLICON   Used for:  Flatulence, eructation and gas pain        Dose:  80 mg   Take 1 tablet (80 mg) by mouth every 6 hours as needed for cramping   Quantity:  180 tablet   Refills:  0         CONTINUE these medicines which may have CHANGED, or have new prescriptions. If we are uncertain of the size of tablets/capsules you have at home, strength may be listed as something that might have changed.        Dose / Directions    metoprolol succinate 25 MG 24 hr tablet   Commonly known as:  TOPROL-XL   This may have changed:  when to take this   Used for:  Nonrheumatic aortic valve stenosis        Dose:  12.5 mg   Take 0.5 tablets (12.5 mg) by mouth daily   Quantity:  45 tablet   Refills:  2         CONTINUE these medicines which have NOT CHANGED        Dose / Directions    apixaban ANTICOAGULANT 5 MG tablet   Commonly known as:  ELIQUIS   Used for:  Atrial fibrillation and flutter (H)        Dose:  5  mg   Take 1 tablet (5 mg) by mouth 2 times daily   Quantity:  180 tablet   Refills:  2       ASPIRIN PO        Dose:  81 mg   Take 81 mg by mouth every evening   Refills:  0       Cyanocobalamin 2000 MCG Tabs        Dose:  2000 mcg   Take 2,000 mcg by mouth every 7 days On Sundays   Refills:  0       gabapentin 300 MG capsule   Commonly known as:  NEURONTIN   Used for:  Restless legs syndrome (RLS)        Dose:  600 mg   Take 2 capsules (600 mg) by mouth every evening   Quantity:  60 capsule   Refills:  5       multivitamin Tabs tablet        Dose:  1 tablet   Take 1 tablet by mouth 2 times daily   Refills:  0         STOP taking     acetaminophen 500 MG tablet   Commonly known as:  TYLENOL           atorvastatin 40 MG tablet   Commonly known as:  LIPITOR                Where to get your medicines      These medications were sent to [facility] [id number] [place] [address] AT [facility] [address] [facility] [facility] [address]     Phone:  [phone]     budesonide 9 MG 24 hr tablet         Some of these will need a paper prescription and others can be bought over the counter. Ask your nurse if you have questions.     You don't need a prescription for these medications     loperamide 2 MG capsule    simethicone 80 MG chewable tablet                Protect others around you: Learn how to safely use, store and throw away your medicines at www.disposemymeds.org.             Medication List: This is a list of all your medications and when to take them. Check marks below indicate your daily home schedule. Keep this list as a reference.      Medications           Morning Afternoon Evening Bedtime As Needed    apixaban ANTICOAGULANT 5 MG tablet   Commonly known as:  ELIQUIS   Take 1 tablet (5 mg) by mouth 2 times daily   Last time this was given:  5 mg on 9/10/2018  8:22 AM                                ASPIRIN PO   Take 81 mg by mouth every evening   Last time this was given:   81 mg on 9/9/2018  7:54 PM                                budesonide 9 MG 24 hr tablet   Commonly known as:  UCERIS   Take 1 tablet (9 mg) by mouth daily   Start taking on:  9/11/2018   Last time this was given:  9 mg on 9/10/2018  8:22 AM                                Cyanocobalamin 2000 MCG Tabs   Take 2,000 mcg by mouth every 7 days On Sundays   Last time this was given:  2,000 mcg on 9/9/2018  8:10 AM                                gabapentin 300 MG capsule   Commonly known as:  NEURONTIN   Take 2 capsules (600 mg) by mouth every evening   Last time this was given:  600 mg on 9/9/2018  7:55 PM                                loperamide 2 MG capsule   Commonly known as:  IMODIUM   Take 2 capsules (4 mg) by mouth 4 times daily as needed for diarrhea   Last time this was given:  4 mg on 9/10/2018 12:02 PM                                metoprolol succinate 25 MG 24 hr tablet   Commonly known as:  TOPROL-XL   Take 0.5 tablets (12.5 mg) by mouth daily   Last time this was given:  12.5 mg on 9/9/2018  7:55 PM                                multivitamin Tabs tablet   Take 1 tablet by mouth 2 times daily                                simethicone 80 MG chewable tablet   Commonly known as:  MYLICON   Take 1 tablet (80 mg) by mouth every 6 hours as needed for cramping   Last time this was given:  80 mg on 9/10/2018  1:08 AM

## 2018-09-05 NOTE — IP AVS SNAPSHOT
` `     [place] [address] [facility]: [phone]            Medication Administration Report for Efrem Ramos as of 09/10/18 1251   Legend:    Given Hold Not Given Due Canceled Entry Other Actions    Time Time (Time) Time  Time-Action       Inactive    Active    Linked        Medications 09/04/18 09/05/18 09/06/18 09/07/18 09/08/18 09/09/18 09/10/18    acetaminophen (TYLENOL) tablet 650 mg  Dose: 650 mg  Freq: EVERY 4 HOURS PRN Route: PO  PRN Reason: mild pain  Start: 09/05/18 2125   Admin Instructions: Alternate ibuprofen (if ordered) with acetaminophen.  Maximum acetaminophen dose from all sources = 75 mg/kg/day not to exceed 4 grams/day.    Admin. Amount: 2 tablet (2 × 325 mg tablet)  Dispense Loc: [facility]               apixaban ANTICOAGULANT (ELIQUIS) tablet 5 mg  Dose: 5 mg  Freq: 2 TIMES DAILY Route: PO  Start: 09/05/18 2130   Admin. Amount: 1 tablet (1 × 5 mg tablet)  Last Admin: 09/10/18 0822  Dispense Loc: [facility]      2152 (5 mg)-Given        0933 (5 mg)-Given       2055 (5 mg)-Given        0832 (5 mg)-Given       2020 (5 mg)-Given        0802 (5 mg)-Given       2107 (5 mg)-Given        0810 (5 mg)-Given       1957 (5 mg)-Given               0822 (5 mg)-Given       [ ] 2100           aspirin chewable tablet 81 mg  Dose: 81 mg  Freq: EVERY EVENING Route: PO  Start: 09/05/18 2130   Admin. Amount: 1 tablet (1 × 81 mg tablet)  Last Admin: 09/09/18 1954  Dispense Loc: [facility]      2152 (81 mg)-Given        2055 (81 mg)-Given        2020 (81 mg)-Given        2107 (81 mg)-Given        1954 (81 mg)-Given        [ ] 2000           budesonide (UCERIS) 24 hr tablet 9 mg  Dose: 9 mg  Freq: DAILY Route: PO  Start: 09/06/18 1200   Admin. Amount: 1 tablet (1 × 9 mg tablet)  Last Admin: 09/10/18 0822  Dispense Loc: [facility]       1541 (9 mg)-Given        0832 (9 mg)-Given        0803 (9 mg)-Given        0810 (9 mg)-Given        0822 (9 mg)-Given           cyanocobalamin (vitamin  B-12)  "tablet 2,000 mcg  Dose: 2,000 mcg  Freq: EVERY 7 DAYS Route: PO  Start: 09/09/18 0900   Admin. Amount: 2 tablet (2 × 1,000 mcg tablet)  Last Admin: 09/09/18 0810  Dispense Loc: [place] [id number]          0810 (2,000 mcg)-Given            gabapentin (NEURONTIN) capsule 600 mg  Dose: 600 mg  Freq: EVERY EVENING Route: PO  Start: 09/05/18 2130   Admin. Amount: 2 capsule (2 × 300 mg capsule)  Last Admin: 09/09/18 1955  Dispense Loc: [place] [id number]      2152 (600 mg)-Given        2055 (600 mg)-Given        2020 (600 mg)-Given        2107 (600 mg)-Given        1955 (600 mg)-Given        [ ] 2000           lidocaine (LMX4) cream  Freq: EVERY 1 HOUR PRN Route: Top  PRN Reason: pain  PRN Comment: with VAD insertion or accessing implanted port.  Start: 09/05/18 2125   Admin Instructions: Do NOT give if patient has a history of allergy to any local anesthetic or any \"kera\" product.   Apply 30 minutes prior to VAD insertion or port access.  MAX Dose:  2.5 g (  of 5 g tube)    Dispense Loc: Contact Rx for dose               lidocaine 1 % 1 mL  Dose: 1 mL  Freq: EVERY 1 HOUR PRN Route: OTHER  PRN Comment: mild pain with VAD insertion or accessing implanted port  Start: 09/05/18 2125   Admin Instructions: Do NOT give if patient has a history of allergy to any local anesthetic or any \"kera\" product. MAX dose 1 mL subcutaneous OR intradermal in divided doses.    Admin. Amount: 1 mL  Dispense Loc: [facility]  Volume: 20 mL               loperamide (IMODIUM) capsule 4 mg  Dose: 4 mg  Freq: 4 TIMES DAILY Route: PO  Start: 09/06/18 0945   Admin. Amount: 2 capsule (2 × 2 mg capsule)  Last Admin: 09/10/18 1202  Dispense Loc: [place] [id number]       1248 (4 mg)-Given       1542 (4 mg)-Given       2054 (4 mg)-Given        0606 (4 mg)-Given       1039 (4 mg)-Given       1436 (4 mg)-Given       1814 (4 mg)-Given        0650 (4 mg)-Given       (1033)-Not Given       1306 (4 mg)-Given              1854 (4 mg)-Given        0702 (4 mg)-Given       1000 (4 " mg)-Given       1422 (4 mg)-Given       1953 (4 mg)-Given        0642 (4 mg)-Given       1202 (4 mg)-Given       [ ] 1500       [ ] 1900           magnesium sulfate 2 g in water intermittent infusion  Dose: 2 g  Freq: DAILY PRN Route: IV  PRN Reason: magnesium supplementation  Start: 09/05/18 2125   Admin Instructions: For Serum Mg++ 1.6 - 2 mg/dL  Give 2 g and recheck magnesium level next AM.    Admin. Amount: 2 g = 50 mL Conc: 0.04 g/mL  Dispense Loc: Contact Rx for dose  Infused Over: 60 Minutes  Volume: 50 mL               magnesium sulfate 4 g in 100 mL sterile water (premade)  Dose: 4 g  Freq: EVERY 4 HOURS PRN Route: IV  PRN Reason: magnesium supplementation  Start: 09/05/18 2125   Admin Instructions: For serum Mg++ less than 1.6 mg/dL  Give 4 g and recheck magnesium level 2 hours after dose, and next AM.    Admin. Amount: 4 g = 100 mL Conc: 4 g/100 mL  Last Admin: 09/08/18 1037  Dispense Loc: Contact Rx for dose  Infused Over: 120 Minutes  Volume: 100 mL         1037 (4 g)-New Bag             May continue current IV fluids if patient has IV fluids infusing.  Freq: CONTINUOUS PRN Route: XX  Start: 09/08/18 1211   Dispense Loc: [facility] Floor Stock  POC: Post-procedure               melatonin tablet 1 mg  Dose: 1 mg  Freq: AT BEDTIME PRN Route: PO  PRN Reason: sleep  Start: 09/05/18 2125   Admin Instructions: Do not give unless at least 6 hours of uninterrupted sleep is expected.    Admin. Amount: 1 tablet (1 × 1 mg tablet)  Dispense Loc:  [facility]               metoprolol succinate (TOPROL-XL) half-tab 12.5 mg  Dose: 12.5 mg  Freq: EVERY EVENING Route: PO  Start: 09/05/18 2130   Admin Instructions: DO NOT CRUSH. Tablet may be split in half along score line.  Hold it if SBP<100 or HR<55    Admin. Amount: 1 half-tab (1 × 12.5 mg half-tab)  Last Admin: 09/09/18 1955  Dispense Loc:  [facility]      (2145)-Not Given        2054 (12.5 mg)-Given        2020 (12.5 mg)-Given        2111-Hold        1955 (12.5 mg)-Given         [ ] 2000           ondansetron (ZOFRAN-ODT) ODT tab 4 mg  Dose: 4 mg  Freq: EVERY 6 HOURS PRN Route: PO  PRN Reasons: nausea,vomiting  Start: 09/05/18 2125   Admin Instructions: This is Step 1 of nausea and vomiting management.  If nausea not resolved in 15 minutes, go to Step 2 prochlorperazine (COMPAZINE). Do not push through foil backing. Peel back foil and gently remove. Place on tongue immediately. Administration with liquid unnecessary  With dry hands, peel back foil backing and gently remove tablet; do not push oral disintegrating tablet through foil backing; administer immediately on tongue and oral disintegrating tablet dissolves in seconds; then swallow with saliva; liquid not required.    Admin. Amount: 1 tablet (1 × 4 mg tablet)  Dispense Loc:  [facility]              Or  ondansetron (ZOFRAN) injection 4 mg  Dose: 4 mg  Freq: EVERY 6 HOURS PRN Route: IV  PRN Reasons: nausea,vomiting  Start: 09/05/18 2125   Admin Instructions: This is Step 1 of nausea and vomiting management.  If nausea not resolved in 15 minutes, go to Step 2 prochlorperazine (COMPAZINE).  Irritant. For ordered doses up to 4 mg, give IV Push undiluted over 2-5 minutes.    Admin. Amount: 4 mg = 2 mL Conc: 4 mg/2 mL  Dispense Loc:  [facility]  Infused Over: 2-5 Minutes  Volume: 2 mL               Patient is already receiving anticoagulation with heparin, enoxaparin (LOVENOX), warfarin (COUMADIN)  or other anticoagulant medication  Freq: CONTINUOUS PRN Route: XX  Start: 09/05/18 2125   Dispense Loc: [facility] Floor Stock               potassium chloride (KLOR-CON) Packet 20-40 mEq  Dose: 20-40 mEq  Freq: EVERY 2 HOURS PRN Route: ORAL OR FEED  PRN Reason: potassium supplementation  Start: 09/05/18 2125   Admin Instructions: Use if unable to tolerate tablets.    If Serum K+ 3.4-4.0, dose = 20 mEq x1. Recheck K+ level the next AM.  If Serum K+ 3.0-3.3, dose = 60 mEq po total dose (40 mEq x 1 followed in 2 hours by 20 mEq X1). Recheck K+ level 4  hours after dose and the next AM.  If Serum K+ 2.5-2.9, dose = 80 mEq po total dose (40 mEq Q2H x2). Recheck K+ level 4 hours after dose and the next AM.  If Serum K+ less than 2.5, See IV order.  Dissolve packet contents in 4-8 ounces of cold water or juice.    Admin. Amount: 20-40 mEq  Last Admin: 09/06/18 0933  Dispense Loc:  [facility]      2351 (40 mEq)-Given        0200 (40 mEq)-Given       0647 (40 mEq)-Given       0933 (20 mEq)-Given               potassium chloride 10 mEq in 100 mL intermittent infusion with 10 mg lidocaine  Dose: 10 mEq  Freq: EVERY 1 HOUR PRN Route: IV  PRN Reason: potassium supplementation  Start: 09/05/18 2125   Admin Instructions: Infuse via PERIPHERAL LINE. Use potassium with lidocaine for pain with peripheral administration.  If Serum K+ 3.4-4.0, dose = 10 mEq/hr x2 doses. Recheck K+ level the next AM.  If Serum K+ 3.0-3.3, dose = 10 mEq/hr x4 doses (40 mEq IV total dose). Recheck K+ level 2 hours after dose and the next AM.  If Serum K+ less than 3.0, dose = 10 mEq/hr x6 doses (60 mEq IV total dose). Recheck K+ level 2 hours after dose and the next AM.    Admin. Amount: 10 mEq = 100 mL Conc: 10 mEq/100 mL  Dispense Loc: Contact Rx for dose  Infused Over: 1 Hours  Volume: 100 mL               potassium chloride 10 mEq in 100 mL sterile water intermittent infusion (premix)  Dose: 10 mEq  Freq: EVERY 1 HOUR PRN Route: IV  PRN Reason: potassium supplementation  Start: 09/05/18 2125   Admin Instructions: Infuse via PERIPHERAL LINE or CENTRAL LINE. Use for central line replacement if patient weight less than 65 kg, if patient is on TPN with high potassium content or if unit does not stock 20 mEq bags.  If Serum K+ 3.4-4.0, dose = 10 mEq/hr x2 doses. Recheck K+ level the next AM.  If Serum K+ 3.0-3.3, dose = 10 mEq/hr x4 doses (40 mEq IV total dose). Recheck K+ level 2 hours after dose and the next AM.  If Serum K+ less than 3.0, dose = 10 mEq/hr x6 doses (60 mEq IV total dose). Recheck K+  level 2 hours after dose and the next AM.    Admin. Amount: 10 mEq = 100 mL Conc: 10 mEq/100 mL  Dispense Loc: Contact Rx for dose  Infused Over: 60 Minutes  Volume: 100 mL               potassium chloride 20 mEq in 50 mL intermittent infusion  Dose: 20 mEq  Freq: EVERY 1 HOUR PRN Route: IV  PRN Reason: potassium supplementation  Start: 09/05/18 2125   Admin Instructions: Infuse via CENTRAL LINE Only.  May need EKG if less than 65 kg or on TPN - Max rate is 0.3 mEq/kg/hr for patients not on EKG monitoring.    If Serum K+ 3.4-4.0, dose = 20 mEq/hr x1 doses. Recheck K+ level the next AM.  If Serum K+ 3.0-3.3, dose = 20 mEq/hr x2 doses (40 mEq IV total dose).  Recheck K+ level 2 hours after dose and the next AM.  If Serum K+ less than 3.0, dose = 20 mEq/hr x3 doses (60 mEq IV total dose). Recheck K+ level 2 hours after dose and the next AM.    Admin. Amount: 20 mEq = 50 mL Conc: 20 mEq/50 mL  Dispense Loc: Contact Rx for dose  Volume: 50 mL               potassium chloride SA (K-DUR/KLOR-CON M) CR tablet 20-40 mEq  Dose: 20-40 mEq  Freq: EVERY 2 HOURS PRN Route: PO  PRN Reason: potassium supplementation  Start: 09/05/18 2125   Admin Instructions: Use if able to take PO.   If Serum K+ 3.4-4.0, dose = 20 mEq x1. Recheck K+ level the next AM.  If Serum K+ 3.0-3.3, dose = 60 mEq po total dose (40 mEq x1 followed in 2 hours by 20 mEq x1). Recheck K+ level 4 hours after dose and the next AM.  If Serum K+ 2.5-2.9, dose = 80 mEq po total dose (40 mEq Q2H x2). Recheck K+ level 4 hours after dose and the next AM.  If Serum K+ less than 2.5, See IV order.  DO NOT CRUSH    Admin. Amount: 1-2 tablet (1-2 × 20 mEq tablet)  Last Admin: 09/07/18 1249  Dispense Loc:  [facility]       1542 (20 mEq)-Given        1249 (20 mEq)-Given [C]              prochlorperazine (COMPAZINE) injection 5 mg  Dose: 5 mg  Freq: EVERY 6 HOURS PRN Route: IV  PRN Reasons: nausea,vomiting  Start: 09/05/18 2125   Admin Instructions: This is Step 2 of nausea and  vomiting management. Give if nausea not resolved 15 minutes after giving ondansetron (ZOFRAN). If nausea not resolved in 15 minutes, go to Step 3 metoclopramide (REGLAN), if ordered.  For ordered doses up to 10 mg, give IV Push undiluted. Each 5mg over 1 minute.    Admin. Amount: 5 mg = 1 mL Conc: 5 mg/mL  Dispense Loc:  [facility]  Infused Over: 1-2 Minutes  Volume: 1 mL              Or  prochlorperazine (COMPAZINE) tablet 5 mg  Dose: 5 mg  Freq: EVERY 6 HOURS PRN Route: PO  PRN Reason: vomiting  Start: 09/05/18 2125   Admin Instructions: This is Step 2 of nausea and vomiting management. Give if nausea not resolved 15 minutes after giving ondansetron (ZOFRAN). If nausea not resolved in 15 minutes, go to Step 3 metoclopramide (REGLAN), if ordered.    Admin. Amount: 1 tablet (1 × 5 mg tablet)  Dispense Loc:  [facility]              Or  prochlorperazine (COMPAZINE) Suppository 12.5 mg  Dose: 12.5 mg  Freq: EVERY 12 HOURS PRN Route: RE  PRN Reasons: nausea,vomiting  Start: 09/05/18 2125   Admin Instructions: This is Step 2 of nausea and vomiting management. Give if nausea not resolved 15 minutes after giving ondansetron (ZOFRAN). If nausea not resolved in 15 minutes, go to Step 3 metoclopramide (REGLAN), if ordered.    Admin. Amount: 0.5 suppository (0.5 × 25 mg suppository)  Dispense Loc:  Main Pharmacy               simethicone (MYLICON) chewable tablet 80 mg  Dose: 80 mg  Freq: EVERY 6 HOURS PRN Route: PO  PRN Reason: cramping  Start: 09/07/18 1711   Admin. Amount: 1 tablet (1 × 80 mg tablet)  Last Admin: 09/10/18 0108  Dispense Loc:  [facility]        1722 (80 mg)-Given        2340 (80 mg)-Given         0108 (80 mg)-Given           sodium chloride (PF) 0.9% PF flush 3 mL  Dose: 3 mL  Freq: EVERY 1 MIN PRN Route: IV  PRN Reason: line flush  PRN Comment: after medication administration. For peripheral IV flush post IV meds  Start: 09/08/18 1211   Admin. Amount: 3 mL  Dispense Loc: [facility] Floor Stock  Volume: 3 mL  POC:  Post-procedure               sodium chloride (PF) 0.9% PF flush 3 mL  Dose: 3 mL  Freq: EVERY 8 HOURS Route: IK  Start: 09/05/18 2130   Admin Instructions: And Q1H PRN, to lock peripheral IV dormant line.    Admin. Amount: 3 mL  Last Admin: 09/10/18 0827  Dispense Loc: [facility] Stock  Volume: 3 mL   Current Line: Peripheral IV 09/09/18 Right Upper forearm     (2146)-Not Given        (0435)-Not Given       (1233)-Not Given       2056 (3 mL)-Given        (0606)-Not Given       (1433)-Not Given       (2137)-Not Given        (0651)-Not Given       (1310)-Not Given       (2111)-Not Given        (0654)-Not Given       (1615)-Not Given       (2002)-Not Given        (0446)-Not Given       0827 (3 mL)-Given       (1202)-Not Given       [ ] 2100           sodium chloride (PF) 0.9% PF flush 3 mL  Dose: 3 mL  Freq: EVERY 1 HOUR PRN Route: IK  PRN Reason: line flush  PRN Comment: for peripheral IV flush post IV meds  Start: 09/05/18 2125   Admin. Amount: 3 mL  Dispense Loc: [facility] Floor Stock  Volume: 3 mL              Discontinued Medications  Medications 09/04/18 09/05/18 09/06/18 09/07/18 09/08/18 09/09/18 09/10/18         Rate: 100 mL/hr   Freq: CONTINUOUS Route: IV  Start: 09/06/18 0830   End: 09/10/18 0807   Last Admin: 09/09/18 2308  Dispense Loc:  Main Pharmacy  Volume: 1,000 mL   Mixture Administration Information:   Medication Type Amount   0.45% sodium chloride + KCl 20 mEq/L 20-0.45 MEQ/L-% SOLN Medications 1,000 mL           Current Line: Peripheral IV 09/09/18 Right Upper forearm      0919 ( )-New Bag       2149 ( )-New Bag        0714 ( )-New Bag       1656 ( )-New Bag        0225 ( )-New Bag       1309 ( )-New Bag       2345 ( )-New Bag        1000 ( )-New Bag       1954 ( )-Rate/Dose Verify       2308 ( )-New Bag        0807-Med Discontinued         Freq: PRN  Start: 09/08/18 1144   End: 09/08/18 1234   Last Admin: 09/08/18 1144  Infused Over: 3-5 Minutes  POC: Intra-procedure         1144 (50 mcg)-Given        1234-Med Discontinued           Dose: 0.2 mg  Freq: EVERY 1 MIN PRN Route: IV  PRN Reason: benzodiazepine reversal  PRN Comment: over sedation  Start: 09/08/18 1211   End: 09/09/18 0010   Admin Instructions: Give over 15 seconds. If inadequate response after 45 seconds, may repeat up to a MAX total dose of 1 mg)  Irritant. For ordered doses up to 1 mg, give IV Push undiluted. Administer each 0.2mg over 15 seconds.    Admin. Amount: 0.2 mg = 2 mL Conc: 0.1 mg/mL  Dispense Loc: [facility]  Infused Over: 1 Minutes  Volume: 5 mL  POC: Post-procedure          0010-Med Discontinued          Freq: PRN  Start: 09/08/18 1145   End: 09/08/18 1234   Last Admin: 09/08/18 1145  Infused Over: 2 Minutes  POC: Intra-procedure         1145 (0.5 mg)-Given       1234-Med Discontinued           Dose: 0.1-0.4 mg  Freq: EVERY 2 MIN PRN Route: IV  PRN Reason: opioid reversal  Start: 09/08/18 1211   End: 09/09/18 1210   Admin Instructions: For apnea or imminent respiratory arrest: give 0.4 mg IV undiluted Q 2 minutes PRN until desired degree of reversal is obtained, stop opioid and notify provider. Continue monitoring until discharge criteria are met for a minimum of 2 hours.  For severe sedation, decrease in respiratory depth, quality or respiratory rate less than 8: give 0.1 mg IV Q 2 minutes x 3 doses, stop opioid and notify provider.  Try to minimize reversal of analgesia especially in end-of-life patients  For ordered doses up to 2mg give IVP. Give each 0.4mg over 15 seconds in emergency situations. For non-emergent situations further dilute in 9mL of NS to facilitate titration of response.    Admin. Amount: 0.1-0.4 mg = 0.25-1 mL Conc: 0.4 mg/mL  Dispense Loc: [facility]  Volume: 1 mL  POC: Post-procedure          1210-Med Discontinued          Dose: 0.1-0.4 mg  Freq: EVERY 2 MIN PRN Route: IV  PRN Reason: opioid reversal  Start: 09/05/18 2125   End: 09/08/18 1229   Admin Instructions: For respiratory rate LESS than or EQUAL to 8.   Partial reversal dose:  0.1 mg titrated q 2 minutes for Analgesia Side Effects Monitoring Sedation Level of 3 (frequently drowsy, arousable, drifts to sleep during conversation).Full reversal dose:  0.4 mg bolus for Analgesia Side Effects Monitoring Sedation Level of 4 (somnolent, minimal or no response to stimulation).  For ordered doses up to 2mg give IVP. Give each 0.4mg over 15 seconds in emergency situations. For non-emergent situations further dilute in 9mL of NS to facilitate titration of response.    Admin. Amount: 0.1-0.4 mg = 0.25-1 mL Conc: 0.4 mg/mL  Dispense Loc: [facility]  Volume: 1 mL         1229-Med Discontinued           Dose: 4 mg  Freq: EVERY 6 HOURS PRN Route: PO  PRN Reasons: nausea,vomiting  Start: 09/08/18 1231   End: 09/08/18 1232   Admin Instructions: This is Step 1 of nausea and vomiting management.  If nausea not resolved in 15 minutes, go to Step 2 prochlorperazine (COMPAZINE). Do not push through foil backing. Peel back foil and gently remove. Place on tongue immediately. Administration with liquid unnecessary  With dry hands, peel back foil backing and gently remove tablet; do not push oral disintegrating tablet through foil backing; administer immediately on tongue and oral disintegrating tablet dissolves in seconds; then swallow with saliva; liquid not required.    Admin. Amount: 1 tablet (1 × 4 mg tablet)  POC: Post-procedure         1232-Med Discontinued        Or    Dose: 4 mg  Freq: EVERY 6 HOURS PRN Route: IV  PRN Reasons: nausea,vomiting  Start: 09/08/18 1231   End: 09/08/18 1232   Admin Instructions: This is Step 1 of nausea and vomiting management.  If nausea not resolved in 15 minutes, go to Step 2 prochlorperazine (COMPAZINE).  Irritant. For ordered doses up to 4 mg, give IV Push undiluted over 2-5 minutes.    Admin. Amount: 4 mg = 2 mL Conc: 4 mg/2 mL  Infused Over: 2-5 Minutes  Volume: 2 mL  POC: Post-procedure         1232-Med Discontinued           Dose: 4 mg  Freq:  EVERY 6 HOURS PRN Route: PO  PRN Reasons: nausea,vomiting  Start: 09/08/18 1211   End: 09/08/18 1231   Admin Instructions: This is Step 1 of nausea and vomiting management.  If nausea not resolved in 15 minutes, go to Step 2 prochlorperazine (COMPAZINE). Do not push through foil backing. Peel back foil and gently remove. Place on tongue immediately. Administration with liquid unnecessary  With dry hands, peel back foil backing and gently remove tablet; do not push oral disintegrating tablet through foil backing; administer immediately on tongue and oral disintegrating tablet dissolves in seconds; then swallow with saliva; liquid not required.    Admin. Amount: 1 tablet (1 × 4 mg tablet)  POC: Post-procedure         1231-Med Discontinued        Or    Dose: 4 mg  Freq: EVERY 6 HOURS PRN Route: IV  PRN Reasons: nausea,vomiting  Start: 09/08/18 1211   End: 09/08/18 1231   Admin Instructions: This is Step 1 of nausea and vomiting management.  If nausea not resolved in 15 minutes, go to Step 2 prochlorperazine (COMPAZINE).  Irritant. For ordered doses up to 4 mg, give IV Push undiluted over 2-5 minutes.    Admin. Amount: 4 mg = 2 mL Conc: 4 mg/2 mL  Infused Over: 2-5 Minutes  Volume: 2 mL  POC: Post-procedure         1231-Med Discontinued      Medications 09/04/18 09/05/18 09/06/18 09/07/18 09/08/18 09/09/18 09/10/18

## 2018-09-05 NOTE — IP AVS SNAPSHOT
` ` Patient Information     Patient Name Sex     Efrem Ramos ([id number]) Male 1945       Room Bed    [facility]      Patient Demographics     Address Phone E-mail Address    [address]  [facility] [address] [phone] (Home)  [phone] (Mobile) *Preferred* [email or web address]      Patient Ethnicity & Race     Ethnic Group Patient Race    American White      Emergency Contact(s)     Name Relation Home Work Mobile    Mariajose Ramos Spouse [phone]      Documents on File        Status Date Received Description       Documents for the Patient    Face Sheet  () 05     Insurance Card  ()      Privacy Notice - [place] Received 13     Insurance Card  () 05     Face Sheet Received () 09     Face Sheet Received () 07/15/10     Insurance Card  () 07/15/10     External Medication Information Consent Accepted () 07/15/10     Patient ID Received () 13 [place] exp: 2015    Consent for Services - Hospital/Clinic Received () 13     Consent for EHR Access Received 13     [facility] Specified Other       Insurance Card Received () 13 BCBS    Insurance Card Received 13 Medicare    External Medication Information Consent Accepted 13     Consent to Communicate Received 13     Consent to Communicate Received 13    Consent for Services - Hospital/Clinic Received () 05/12/15     Insurance Card Received () 05/22/15 BCBS- [place]    Business/Insurance/Care Coordination/Health Form - Patient  06/11/15 [employer]    Speech Therapy Certification Received 07/09/15 6/30/15 Eval only    HIM REBEKAH Authorization  07/06/15     HIM REBEKAH Authorization  07/07/15     Patient ID Received 07/14/15 DL - 2019    Consent for Services/Privacy Notice - Hospital/Clinic Received () 16     Privacy Notice - [place] Received 16      Insurance Card Received 16 BCBS    Patient ID Received 16     HIM REBEKAH Authorization  16     Consent for Services/Privacy Notice - Hospital/Clinic Received () 02/10/17     Insurance Card Received 17     Patient ID Received 17     Care Everywhere Prospective Auth Received 18     Consent for Services/Privacy Notice - Hospital/Clinic Received 18     Consent for Services - Hospital and Clinic Received 18     HIE Auth Received 18     Insurance Card Received 18 BCBS 2018    Insurance Card Received 18 MEDICARE    Insurance Card  (Deleted) 09        Documents for the Encounter    CMS IM for Patient Signature Received 18       Admission Information     Attending Provider Admitting Provider Admission Type Admission Date/Time    Aidan Zazueta MD Aronson, Christopher D, MD Emergency 18  1607    Discharge Date Hospital Service Auth/Cert Status Service Area     Hospitalist [facility] SERVICES    Unit Room/Bed Admission Status        [facility] UNIT 6629/6629-01 Admission (Confirmed)       Admission     Complaint    Hypokalemia, anemia      Hospital Account     Name Acct ID Class Status Primary Coverage    Efrem Ramos [id number] Inpatient Open MEDICARE - MEDICARE            Guarantor Account (for Hospital Account #[id number])     Name Relation to Pt Service Area Active? Acct Type    Efrem Ramos  FCS Yes Personal/Family    Address Phone          [address] [place] [address] [phone](H)              Coverage Information (for Hospital Account #[id number])     1. MEDICARE/MEDICARE     F/O Payor/Plan Precert #    MEDICARE/MEDICARE     Subscriber Subscriber #    Efrem Ramos [id number]    Address Phone    [employer]  [address]  [place] [address] [phone]          2. [place]     F/O Payor/Plan Precert #    [place]     Subscriber Subscriber #     Efrem Ramos [id number]    Address Phone    [address] [address]  [place] [address] [phone]

## 2018-09-05 NOTE — ED PROVIDER NOTES
"  History     Chief Complaint:  Generalized Weakness and Diarrhea       HPI   Efrem Ramos is a 73 year old male with past medical history significant for high blood pressure, high cholesterol and atrial fibrillation and stroke who presents the emergency department with generalized weakness.  He has been having loose runny stools for the past 2 weeks has been eating less.  He says he feels like this voice is hoarse.  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  He was given the \"allCLEAR\" by his otolaryngologist.  He has not had any blood in his stool.  Has not been having any vomiting.  Denies any chest pain.  He is a mild cough.  He does have a history of a colitis of some sort.  He was previously diagnosed as ulcerative colitis but was later told that it was some other type of colitis which he is unsure of the name.    Allergies:  Allergies   Allergen Reactions     Adhesive Tape Blisters     Amiodarone      Lasix [Furosemide] Other (See Comments)     Throat swelling, wheezing     Penicillins Unknown     Sulfa Drugs Difficulty breathing        Medications:    Current Facility-Administered Medications   Medication     0.9% sodium chloride BOLUS    Followed by     sodium chloride 0.9% infusion     Current Outpatient Prescriptions   Medication     acetaminophen (TYLENOL) 500 MG tablet     apixaban ANTICOAGULANT (ELIQUIS) 5 MG tablet     ASPIRIN PO     atorvastatin (LIPITOR) 40 MG tablet     Cyanocobalamin 2000 MCG TABS     gabapentin (NEURONTIN) 300 MG capsule     metoprolol succinate (TOPROL-XL) 25 MG 24 hr tablet     multivitamin (OCUVITE) TABS        Past Medical History:    Past Medical History:   Diagnosis Date     Atrial fibrillation (H)      Cancer (H) vocal cord     Carpal tunnel syndrome     abstracted 7/3/02.     CVA (cerebral infarction) 5/5/2015     Demyelinating disease of central nervous system, unspecified     abstracted 7/3/02.     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     Pneumonia 8/17/2016     Redundant colon     needs CT colonography     S/P coronary angiogram 09/05/2017     Shingles      SKIN DISORDERS NEC 3/15/2005     Sleep apnea         Past Surgical History:    Past Surgical History:   Procedure Laterality Date     BIOPSY  brain 2002     BONE MARROW BIOPSY, BONE SPECIMEN, NEEDLE/TROCAR N/A 6/8/2017    Procedure: BIOPSY BONE MARROW;  UNILATERAL BONE MARROW BIOPSY (CONSCIOUS SEDATION) ;  Surgeon: Jamie Gonzales MD;  Location:  GI     C NONSPECIFIC PROCEDURE  as a child    T & A. abstracted 7/3/02.     C NONSPECIFIC PROCEDURE  early    CTR. abstracted 7/3/02.     HEAD & NECK SURGERY       ORTHOPEDIC SURGERY      right arm ulna reset after injury     THORACOSCOPIC WEDGE RESECTION LUNG Right 8/2/2016    Procedure: THORACOSCOPIC WEDGE RESECTION LUNG;  Surgeon: Abdelrahman Noriega MD;  Location:  OR        Family History:    Family History   Problem Relation Age of Onset     Blood Disease Mother      Anemia     Cardiovascular Father      Cancer - colorectal Maternal Grandfather      Hypertension Brother      Diabetes Sister 5     Juvinile Diabetes passed at 36     Respiratory Other      Lung Cancer        Social History:  Social History     Social History     Marital status:      Spouse name: N/A     Number of children: N/A     Years of education: N/A     Social History Main Topics     Smoking status: Former Smoker     Packs/day: 1.00     Years: 30.00     Types: Cigarettes     Quit date: 7/26/2013     Smokeless tobacco: Never Used     Alcohol use 25.2 oz/week     42 Standard drinks or equivalent per week      Comment: occ     Drug use: No     Sexual activity: Not Currently     Other Topics Concern     Caffeine Concern Yes     1 Coke occasionally      Special Diet No     Exercise No     Social History  "Narrative        Review of Systems   Review of Systems   Constitutional: Positive for chills. Negative for fever.   Respiratory: Positive for cough. Negative for shortness of breath.    Cardiovascular: Negative for chest pain.   Gastrointestinal: Positive for diarrhea. Negative for abdominal pain, blood in stool, nausea and vomiting.   Neurological: Negative for dizziness.   All other systems reviewed and are negative.       Physical Exam   Vitals:   ED Triage Vitals   Enc Vitals Group      BP 09/05/18 1535 91/56      Pulse --       Resp 09/05/18 1535 16      Temp 09/05/18 1535 97.7  F (36.5  C)      Temp src 09/05/18 1535 Oral      SpO2 09/05/18 1535 97 %      Weight 09/05/18 1535 78 kg (172 lb)      Height 09/05/18 1535 1.727 m (5' 8\")      Head Cir --       Peak Flow --       Pain Score --       Pain Loc --       Pain Edu? --       Excl. in GC? --         Physical Exam  Eyes:  The pupils are equal and round    Conjunctivae and sclerae are normal  ENT:    The nose is normal    Pinnae are normal    The oropharynx is dry  CV:  Regular rate and rhythm     No edema  Resp:  Lungs are clear    Non-labored    No rales    No wheezing   GI:  Abdomen is soft without tenderness, there is no rigidity    No distension    No rebound tenderness   MS:  Normal muscular tone    No asymmetric leg swelling  Skin:  No rash or acute skin lesions noted  Neuro:   Awake, alert.      Speech is normal and fluent.    Face is symmetric.     Moves all extremities      Emergency Department Course     ECG (17:29:21):  Rate 67 bpm. MD interval *. QRS duration 86. QT/QTc 422/445. P-R-T axes * -30 2. Atrial fibrillation with premature ventricular or aberrantly conducted complexes. Left axis deviation. Nonspecfic ST abnormality. Abnormal ECG. Interpreted at 1734 by Yosvany Salomon MD.    Imaging:  Abd/pelvis CT no contrast - Stone Protocol   Preliminary Result   IMPRESSION: No acute abnormality. No bowel obstruction or   inflammation.      US " Abdomen Limited   Preliminary Result   IMPRESSION: Normal right upper quadrant. No gallstone or biliary   dilatation.      XR Chest 2 Views   Final Result   IMPRESSION: Elevated left diaphragm. Interval clearing of interstitial   infiltrates noted on the previous exam. No acute infiltrates or   pleural effusion. Mitral annulus calcification.      RAJI THAKUR MD           Laboratory:  Laboratory findings were communicated with the patient who voiced understanding of the findings.    Labs Ordered and Resulted from Time of ED Arrival Up to the Time of Departure from the ED   CBC WITH PLATELETS DIFFERENTIAL - Abnormal; Notable for the following:        Result Value    WBC 19.9 (*)     RBC Count 4.38 (*)     Absolute Neutrophil 15.4 (*)     Absolute Monocytes 1.8 (*)     All other components within normal limits   UA MACROSCOPIC WITH REFLEX TO MICRO AND CULTURE - Abnormal; Notable for the following:     Protein Albumin Urine 10 (*)     Hyaline Casts 17 (*)     All other components within normal limits   COMPREHENSIVE METABOLIC PANEL - Abnormal; Notable for the following:     Potassium 2.3 (*)     Urea Nitrogen 38 (*)     Creatinine 1.66 (*)     GFR Estimate 41 (*)     GFR Estimate If Black 49 (*)     Albumin 3.2 (*)     Alkaline Phosphatase 313 (*)     ALT 97 (*)      (*)     All other components within normal limits   CRP INFLAMMATION - Abnormal; Notable for the following:     CRP Inflammation 36.9 (*)     All other components within normal limits   TROPONIN I   LACTIC ACID WHOLE BLOOD   LIPASE   MAGNESIUM   PERIPHERAL IV CATHETER   BLOOD CULTURE   BLOOD CULTURE   CLOSTRIDIUM DIFFICILE TOXIN B   ENTERIC BACTERIA AND VIRUS PANEL BY MIGUEL ÁNGEL STOOL        Interventions:  Medications   0.9% sodium chloride BOLUS (0 mLs Intravenous Stopped 9/5/18 1740)     Followed by   sodium chloride 0.9% infusion (not administered)   0.9% sodium chloride BOLUS (1,000 mLs Intravenous New Bag 9/5/18 1927)   0.9% sodium chloride BOLUS  (0 mLs Intravenous Stopped 9/5/18 1928)   potassium chloride 10 mEq in 100 mL intermittent infusion with 10 mg lidocaine (0 mEq Intravenous Stopped 9/5/18 1908)   potassium chloride (KLOR-CON) Packet 40 mEq (40 mEq Oral Given 9/5/18 1739)         Emergency Department Course:  Nursing notes and vitals reviewed.  I performed an exam of the patient as documented above.       I personally reviewed the laboratory and imaging results with the patient and answered all related questions prior to admission.    Impression & Plan      Medical Decision Making:  Efrem Ramos is a 73 year old male who presents the emergency department with diarrhea.  He appears dehydrated on exam.  Heart rate is normal.  No focal abdominal tenderness initially.  Labs returned and show elevated liver enzymes, renal insufficiency, and hypokalemia.  Suspect this is all due to the diarrhea and decreased oral intake.  Upon repeat exam he does have some tenderness in his right upper quadrant.  With elevated LFTs and ultrasound was performed.  Ultrasound was negative.  White blood cell count was fairly elevated and on repeat exam again he seemed to have more tenderness in his lower abdomen so CT scan was performed and was negative.  Continue with IV fluid hydration here.  He was given potassium.  Discussed with the hospitalist who agrees except from his admission.  Will be admitted to the floor for further IV fluid hydration.    Diagnosis:  1. Acute renal failure, unspecified acute renal failure type (H)    2. Hypokalemia    3. Diarrhea, unspecified type    4. Dehydration    5. Leukocytosis, unspecified type         Disposition:   Admission    9/5/2018    EMERGENCY DEPARTMENT       Yosvany Salomon MD  05/03/17 9450        Yosvany Salomon MD  09/05/18 3147

## 2018-09-06 ENCOUNTER — APPOINTMENT (OUTPATIENT)
Dept: CT IMAGING | Facility: CLINIC | Age: 73
DRG: 392 | End: 2018-09-06
Attending: INTERNAL MEDICINE
Payer: MEDICARE

## 2018-09-06 ENCOUNTER — APPOINTMENT (OUTPATIENT)
Dept: SPEECH THERAPY | Facility: CLINIC | Age: 73
DRG: 392 | End: 2018-09-06
Attending: HOSPITALIST
Payer: MEDICARE

## 2018-09-06 LAB
ALBUMIN SERPL-MCNC: 2.5 G/DL (ref 3.4–5)
ALP SERPL-CCNC: 323 U/L (ref 40–150)
ALT SERPL W P-5'-P-CCNC: 100 U/L (ref 0–70)
ANION GAP SERPL CALCULATED.3IONS-SCNC: 6 MMOL/L (ref 3–14)
AST SERPL W P-5'-P-CCNC: 146 U/L (ref 0–45)
BILIRUB DIRECT SERPL-MCNC: 0.5 MG/DL (ref 0–0.2)
BILIRUB SERPL-MCNC: 1.2 MG/DL (ref 0.2–1.3)
BUN SERPL-MCNC: 27 MG/DL (ref 7–30)
C COLI+JEJUNI+LARI FUSA STL QL NAA+PROBE: NOT DETECTED
C DIFF TOX B STL QL: NEGATIVE
CALCIUM SERPL-MCNC: 7.2 MG/DL (ref 8.5–10.1)
CHLORIDE SERPL-SCNC: 121 MMOL/L (ref 94–109)
CO2 SERPL-SCNC: 20 MMOL/L (ref 20–32)
CREAT SERPL-MCNC: 1.2 MG/DL (ref 0.66–1.25)
EC STX1 GENE STL QL NAA+PROBE: NOT DETECTED
EC STX2 GENE STL QL NAA+PROBE: NOT DETECTED
ENTERIC PATHOGEN COMMENT: NORMAL
ERYTHROCYTE [DISTWIDTH] IN BLOOD BY AUTOMATED COUNT: 14.3 % (ref 10–15)
GFR SERPL CREATININE-BSD FRML MDRD: 59 ML/MIN/1.7M2
GLUCOSE SERPL-MCNC: 87 MG/DL (ref 70–99)
HCT VFR BLD AUTO: 34.2 % (ref 40–53)
HGB BLD-MCNC: 11.9 G/DL (ref 13.3–17.7)
MCH RBC QN AUTO: 32.6 PG (ref 26.5–33)
MCHC RBC AUTO-ENTMCNC: 34.8 G/DL (ref 31.5–36.5)
MCV RBC AUTO: 94 FL (ref 78–100)
NOROV GI+II ORF1-ORF2 JNC STL QL NAA+PR: NOT DETECTED
PLATELET # BLD AUTO: 272 10E9/L (ref 150–450)
POTASSIUM SERPL-SCNC: 3.3 MMOL/L (ref 3.4–5.3)
POTASSIUM SERPL-SCNC: 3.4 MMOL/L (ref 3.4–5.3)
PROT SERPL-MCNC: 6.7 G/DL (ref 6.8–8.8)
RBC # BLD AUTO: 3.65 10E12/L (ref 4.4–5.9)
RVA NSP5 STL QL NAA+PROBE: NOT DETECTED
SALMONELLA SP RPOD STL QL NAA+PROBE: NOT DETECTED
SHIGELLA SP+EIEC IPAH STL QL NAA+PROBE: NOT DETECTED
SODIUM SERPL-SCNC: 147 MMOL/L (ref 133–144)
SPECIMEN SOURCE: NORMAL
TSH SERPL DL<=0.005 MIU/L-ACNC: 2.05 MU/L (ref 0.4–4)
V CHOL+PARA RFBL+TRKH+TNAA STL QL NAA+PR: NOT DETECTED
WBC # BLD AUTO: 18.4 10E9/L (ref 4–11)
Y ENTERO RECN STL QL NAA+PROBE: NOT DETECTED

## 2018-09-06 PROCEDURE — 25000132 ZZH RX MED GY IP 250 OP 250 PS 637: Mod: GY | Performed by: HOSPITALIST

## 2018-09-06 PROCEDURE — 83516 IMMUNOASSAY NONANTIBODY: CPT | Performed by: PHYSICIAN ASSISTANT

## 2018-09-06 PROCEDURE — 25000132 ZZH RX MED GY IP 250 OP 250 PS 637: Mod: GY | Performed by: INTERNAL MEDICINE

## 2018-09-06 PROCEDURE — 92526 ORAL FUNCTION THERAPY: CPT | Mod: GN | Performed by: SPEECH-LANGUAGE PATHOLOGIST

## 2018-09-06 PROCEDURE — 85027 COMPLETE CBC AUTOMATED: CPT | Performed by: HOSPITALIST

## 2018-09-06 PROCEDURE — A9270 NON-COVERED ITEM OR SERVICE: HCPCS | Mod: GY | Performed by: INTERNAL MEDICINE

## 2018-09-06 PROCEDURE — 92610 EVALUATE SWALLOWING FUNCTION: CPT | Mod: GN | Performed by: SPEECH-LANGUAGE PATHOLOGIST

## 2018-09-06 PROCEDURE — 99233 SBSQ HOSP IP/OBS HIGH 50: CPT | Performed by: INTERNAL MEDICINE

## 2018-09-06 PROCEDURE — 80048 BASIC METABOLIC PNL TOTAL CA: CPT | Performed by: HOSPITALIST

## 2018-09-06 PROCEDURE — 25000132 ZZH RX MED GY IP 250 OP 250 PS 637: Mod: GY | Performed by: PHYSICIAN ASSISTANT

## 2018-09-06 PROCEDURE — 25000128 H RX IP 250 OP 636: Performed by: HOSPITALIST

## 2018-09-06 PROCEDURE — 36415 COLL VENOUS BLD VENIPUNCTURE: CPT | Performed by: HOSPITALIST

## 2018-09-06 PROCEDURE — A9270 NON-COVERED ITEM OR SERVICE: HCPCS | Mod: GY | Performed by: PHYSICIAN ASSISTANT

## 2018-09-06 PROCEDURE — A9270 NON-COVERED ITEM OR SERVICE: HCPCS | Mod: GY | Performed by: HOSPITALIST

## 2018-09-06 PROCEDURE — 36415 COLL VENOUS BLD VENIPUNCTURE: CPT | Performed by: INTERNAL MEDICINE

## 2018-09-06 PROCEDURE — 84443 ASSAY THYROID STIM HORMONE: CPT | Performed by: HOSPITALIST

## 2018-09-06 PROCEDURE — 40000225 ZZH STATISTIC SLP WARD VISIT: Performed by: SPEECH-LANGUAGE PATHOLOGIST

## 2018-09-06 PROCEDURE — 80076 HEPATIC FUNCTION PANEL: CPT | Performed by: HOSPITALIST

## 2018-09-06 PROCEDURE — 84132 ASSAY OF SERUM POTASSIUM: CPT | Performed by: INTERNAL MEDICINE

## 2018-09-06 PROCEDURE — 12000000 ZZH R&B MED SURG/OB

## 2018-09-06 PROCEDURE — 70450 CT HEAD/BRAIN W/O DYE: CPT

## 2018-09-06 RX ORDER — BUDESONIDE 9 MG/1
9 TABLET, FILM COATED, EXTENDED RELEASE ORAL DAILY
Status: DISCONTINUED | OUTPATIENT
Start: 2018-09-06 | End: 2018-09-10 | Stop reason: HOSPADM

## 2018-09-06 RX ORDER — LOPERAMIDE HCL 2 MG
4 CAPSULE ORAL
Status: DISCONTINUED | OUTPATIENT
Start: 2018-09-06 | End: 2018-09-10 | Stop reason: HOSPADM

## 2018-09-06 RX ADMIN — POTASSIUM CHLORIDE 40 MEQ: 1.5 POWDER, FOR SOLUTION ORAL at 06:47

## 2018-09-06 RX ADMIN — Medication 12.5 MG: at 20:54

## 2018-09-06 RX ADMIN — APIXABAN 5 MG: 5 TABLET, FILM COATED ORAL at 20:55

## 2018-09-06 RX ADMIN — ASPIRIN 81 MG 81 MG: 81 TABLET ORAL at 20:55

## 2018-09-06 RX ADMIN — LOPERAMIDE HYDROCHLORIDE 4 MG: 2 CAPSULE ORAL at 15:42

## 2018-09-06 RX ADMIN — SODIUM CHLORIDE: 9 INJECTION, SOLUTION INTRAVENOUS at 06:09

## 2018-09-06 RX ADMIN — LOPERAMIDE HYDROCHLORIDE 4 MG: 2 CAPSULE ORAL at 20:54

## 2018-09-06 RX ADMIN — POTASSIUM CHLORIDE 20 MEQ: 1.5 POWDER, FOR SOLUTION ORAL at 09:33

## 2018-09-06 RX ADMIN — LOPERAMIDE HYDROCHLORIDE 4 MG: 2 CAPSULE ORAL at 12:48

## 2018-09-06 RX ADMIN — APIXABAN 5 MG: 5 TABLET, FILM COATED ORAL at 09:33

## 2018-09-06 RX ADMIN — POTASSIUM CHLORIDE 40 MEQ: 1.5 POWDER, FOR SOLUTION ORAL at 02:00

## 2018-09-06 RX ADMIN — SODIUM CHLORIDE AND POTASSIUM CHLORIDE: 4.5; 1.49 INJECTION, SOLUTION INTRAVENOUS at 21:49

## 2018-09-06 RX ADMIN — BUDESONIDE 9 MG: 9 TABLET, EXTENDED RELEASE ORAL at 15:41

## 2018-09-06 RX ADMIN — SODIUM CHLORIDE AND POTASSIUM CHLORIDE: 4.5; 1.49 INJECTION, SOLUTION INTRAVENOUS at 09:19

## 2018-09-06 RX ADMIN — POTASSIUM CHLORIDE 20 MEQ: 1500 TABLET, EXTENDED RELEASE ORAL at 15:42

## 2018-09-06 RX ADMIN — GABAPENTIN 600 MG: 300 CAPSULE ORAL at 20:55

## 2018-09-06 ASSESSMENT — ACTIVITIES OF DAILY LIVING (ADL)
ADLS_ACUITY_SCORE: 14
ADLS_ACUITY_SCORE: 13
ADLS_ACUITY_SCORE: 14
ADLS_ACUITY_SCORE: 14

## 2018-09-06 NOTE — PROGRESS NOTES
09/06/18 0955   General Information   Onset Date 09/05/18   Start of Care Date 09/06/18   Referring Physician Dr. Zazueta   Patient Profile Review/OT: Additional Occupational Profile Info See Profile for full history and prior level of function   Swallowing Evaluation Bedside swallow evaluation   Behaviorial Observations Alert   Mode of current nutrition Oral diet  (Clears thin)   Type of oral diet Thin liquid   Respiratory Status Room air   Comments Efrem Ramos is a 73 year old male with multiple medical problems 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  He has MGUS which is being followed by his oncologist.  His atrial fibrillation and history of a stroke.  He is anticoagulated with apixaban.  The patient says that he has had colitis throughout his life and he was originally told he had ulcerative colitis.  However later in life he was told that it was not ulcerative colitis but he cannot recall the diagnosis.  He says he has 4-5 loose stools per day on an ongoing basis.  He does not have blood in his stool.  Over the past 2 weeks he has had much more frequent bowel movements.  He is also had some abdominal distention and crampy pain with this.  He has not seen any blood in his stool.  He also says that he has had dysphagia to solid food for a couple of weeks.  He has been taking liquids only.  He has been having trouble swallowing his pills.  He has not had any fever, chills, night sweats, cold, cough, chest pain, shortness of breath.  He has not had any vomiting. Patient had an OP video in 2015 at that time it was WFL.    Clinical Swallow Evaluation   Oral Musculature anomalies present   Structural Abnormalities none present   Dentition present and adequate   Mucosal Quality adequate   Mandibular Strength and Mobility intact   Oral Labial Strength and Mobility impaired retraction   Lingual Strength and Mobility impaired protrusion   Velar Elevation intact   Buccal  Strength and Mobility intact   Laryngeal Function Cough;Throat clear;Swallow;Voicing initiated;Dry swallow palpated   Oral Musculature Comments Mild deficits.   Swallow Eval   Feeding Assistance set up only required   Clinical Swallow Eval: Thin Liquid Texture Trial   Mode of Presentation, Thin Liquids cup;straw;self-fed   Volume of Liquid or Food Presented 5 oz of water   Oral Phase of Swallow Premature pharyngeal entry   Pharyngeal Phase of Swallow impaired;reduction in laryngeal movement;repeated swallows   Diagnostic Statement Premature spillage suspect pharyngeal residue. No overt Sx of aspiration but can not rule out penetration or silent aspiration.    Clinical Swallow Eval: Puree Solid Texture Trial   Mode of Presentation, Puree spoon;self-fed   Volume of Puree Presented 3 teaspoons of pudding   Oral Phase, Puree WFL   Pharyngeal Phase, Puree impaired;repeated swallows   Diagnostic Statement No overt Sx of aspiration.   Clinical Swallow Eval: Semisolid Texture Trial   Mode of Presentation, Semisolid spoon;self-fed   Volume of Semisolid Food Presented 3 teaspoons   Oral Phase, Semisolid Poor AP movement;Premature pharyngeal entry   Pharyngeal Phase, Semisolid impaired;repeated swallows;reduction in laryngeal movement   Diagnostic Statement Effortful looking swallows with globus sensation reported.    Swallow Compensations   Swallow Compensations Alternate viscosity of consistencies;Effortful swallow;Pacing;Reduce amounts;Multiple swallow   Results Suspect silent aspiration   Esophageal Phase of Swallow   Patient reports or presents with symptoms of esophageal dysphagia Yes   Esophageal comments On video 2015 noted to have prominent crico pharyngeus muscle.    General Therapy Interventions   Planned Therapy Interventions Dysphagia Treatment   Dysphagia treatment Modified diet education;Instruction of safe swallow strategies   Swallow Eval: Clinical Impressions   Skilled Criteria for Therapy Intervention  Skilled criteria met.  Treatment indicated.   Functional Assessment Scale (FAS) 4   Treatment Diagnosis Mild to moderate oral and pharyngeal dysphagia   Diet texture recommendations Dysphagia diet level 1;Thin liquids   Recommended Feeding/Eating Techniques alternate between small bites and sips of food/liquid;hard swallow w/ each bite or sip;maintain upright posture during/after eating for 30 mins;no straws;small sips/bites   Therapy Frequency 5 times/wk   Predicted Duration of Therapy Intervention (days/wks) 1 week   Anticipated Discharge Disposition (Pending outcome)   Risks and Benefits of Treatment have been explained. Yes   Patient, family and/or staff in agreement with Plan of Care Yes   Clinical Impression Comments Patient presents with mild to moderate oral and pharyngeal dysphagia at bedside. Patient has a history for vocal cord cancer that was removed. He can not recall if it was his right or left VC. His voice is hoarse with a stratchy vocal quality. He reports globus sensation when eating solids and choking on pills recently. He completed an OP video in 2015, with similiar complaints. It was WFL's, but noted a promient cricopharyngeus muscle. He demonstrated premature entry of thin liquids with mild delay, no overt Sx of aspiration but suspect some pharyngeal residue. Pudding was tolerated without difficulty 2 swallows to clear. Mastication was mildly prolonged for a semi-solid with 2-3 swallows to clear, and reported globus sensation that cleared with a liquid rinse. Recommend; 1. If ok by GI/MD may advance diet to a DDL 1 with thin liquids. 2. Upright, no straws, small bites/sips, hard double swallow and alternate liquids/solids. Crush med-large pills if able. 3. Would complete a video swallow study to fully assess pharyngeal function and source of his globus sensation.    Total Evaluation Time   Total Evaluation Time (Minutes) 20

## 2018-09-06 NOTE — PHARMACY-ADMISSION MEDICATION HISTORY
Admission medication history interview status for the 9/5/2018  admission is complete. See EPIC admission navigator for prior to admission medications     Medication history source reliability:Good    Actions taken by pharmacist (provider contacted, etc): chart review and face to face interview with patient.     Additional medication history information not noted on PTA med list :None    Medication reconciliation/reorder completed by provider prior to medication history? No    Time spent in this activity: 10 minutes    Prior to Admission medications    Medication Sig Last Dose Taking? Auth Provider   apixaban ANTICOAGULANT (ELIQUIS) 5 MG tablet Take 1 tablet (5 mg) by mouth 2 times daily 9/5/2018 at am Yes More, Joanne Dailey PA-C   ASPIRIN PO Take 81 mg by mouth every evening  9/4/2018 at pm Yes Reported, Patient   atorvastatin (LIPITOR) 40 MG tablet Take 1 tablet (40 mg) by mouth daily  Patient taking differently: Take 40 mg by mouth every evening  9/4/2018 at pm Yes Bella Chavis MD   gabapentin (NEURONTIN) 300 MG capsule Take 2 capsules (600 mg) by mouth every evening 9/4/2018 at pm Yes Ron Pacheco MD   metoprolol succinate (TOPROL-XL) 25 MG 24 hr tablet Take 0.5 tablets (12.5 mg) by mouth daily  Patient taking differently: Take 12.5 mg by mouth every evening  9/4/2018 at pm Yes Bella Chavis MD   multivitamin (OCUVITE) TABS Take 1 tablet by mouth 2 times daily  9/5/2018 at am Yes Unknown, Entered By History   acetaminophen (TYLENOL) 500 MG tablet Take 1,000 mg by mouth every 6 hours as needed for mild pain More than a month at prn  Reported, Patient   Cyanocobalamin 2000 MCG TABS Take 2,000 mcg by mouth every 7 days On Sundays 9/2/2018 at pm  Reported, Patient

## 2018-09-06 NOTE — ED NOTES
[facility]  ED Nurse Handoff Report    ED Chief complaint: Generalized Weakness (ongoing x2 weeks, not eating well.  loose stools x2 weeks, known colitis. States a week ago developed hoarse after choking on a pill) and Diarrhea      ED Diagnosis:   Final diagnoses:   Acute renal failure, unspecified acute renal failure type (H)   Hypokalemia   Diarrhea, unspecified type   Dehydration   Leukocytosis, unspecified type       Code Status: Full Code    Allergies:   Allergies   Allergen Reactions     Adhesive Tape Blisters     Amiodarone      Lasix [Furosemide] Other (See Comments)     Throat swelling, wheezing     Penicillins Unknown     Sulfa Drugs Difficulty breathing       Activity level - Baseline/Home:  Independent    Activity Level - Current:   Stand with Assist of 2. Assist of 1-2. Pt feeling weak.     Needed?: No    Isolation: Yes  Infection: Not Applicable  C-Diff Pending  Bariatric?: No    Vital Signs:   Vitals:    09/05/18 1800 09/05/18 1900 09/05/18 1930 09/05/18 1937   BP: 93/43 101/73 (!) 86/63 90/66   Resp: 12  12 10   Temp:       TempSrc:       SpO2: 99%  100% 100%   Weight:       Height:           Cardiac Rhythm: ,        Pain level: 0-10 Pain Scale: 2    Is this patient confused?: No   [place] - Suicide Severity Rating Scale Completed?  Yes  If yes, what color did the patient score?  White    Patient Report: Initial Complaint: increased weakness. diarrhea  Focused Assessment:   Pt has chronic diarrhea from colitis. Pt states diarrhea has been worse the past couple of weeks, watery and multiple episodes a day, 3-7. Decreased appetite. Hx of afib.   Tests Performed: labs, xray, ct, US  Abnormal Results: Potassium 2.3. WBC 19.9. Soft pressures  Treatments provided: fluids, potassium oral and IV    Family Comments: wife at bedside    OBS brochure/video discussed/provided to patient: N/A    ED Medications:   Medications   0.9% sodium chloride BOLUS (0 mLs Intravenous Stopped  9/5/18 1740)     Followed by   sodium chloride 0.9% infusion (not administered)   0.9% sodium chloride BOLUS (1,000 mLs Intravenous New Bag 9/5/18 1927)   0.9% sodium chloride BOLUS (0 mLs Intravenous Stopped 9/5/18 1928)   potassium chloride 10 mEq in 100 mL intermittent infusion with 10 mg lidocaine (0 mEq Intravenous Stopped 9/5/18 1908)   potassium chloride (KLOR-CON) Packet 40 mEq (40 mEq Oral Given 9/5/18 1739)       Drips infusing?:  Yes    For the majority of the shift this patient was Green.   Interventions performed were n/a.    Severe Sepsis OR Septic Shock Diagnosis Present: No      ED NURSE PHONE NUMBER: [phone]

## 2018-09-06 NOTE — PROGRESS NOTES
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    Diarrhea  - h/o chronic diarrhea  - last colonoscopy in 8/2013 was incomplete due to a redundant colon.  Random biopsies were obtained for diarrhea and he was found to have collagenous colitis; he had  a virtual colonoscopy after his last colonoscopy in 9/2013 which was normal except for scattered sigmoid diverticulosis- as per notes  - no associated fever, no abd pain, no blood in stool  - WBCs elevated at 19/9--18.4  - CT abdomen and US abdomen- no acute pathology  - C diff negative; stool sample for enteric bacteria and viruses- negative  - GI consult appreciated  - check TTG Ab  - started on Budesonide daily for 8 weeks; scheduled Imodium QID  - diet as tolerated  - iv fluids 1/2NS with KCl at 100cc/h    Dysphagia to solid food   Voice change  - reported difficulty swallowing solids and pills and change of voice for the last 2 weeks  - has h/o vocal cord cancer and is believed to be in remission  - SLP- rec DD1 with thin liquids  - plan for video swallow tomorrow  - may need to follow up with ENT     Hypokalemia   - likely due to diarrhea  - K replacement protocol      Acute kidney injury   - Probably prerenal due to diarrhea  - started on iv fluids  - cr improved from 1.66--1.2 today 9/6  - continue with IV fluids   - repeat BMP in am    Hypernatremia  - Na 147  - change iv fluids from NS to 1/2NS with KCL at 100cc/h    Elevated liver transaminases    - Total bilirubin is 1.2, ALT 97--100, --146, alkaline phosphatase 313 and lipase 294  - Upper quadrant ultrasound negative  - stop PTA statin for now  - avoid hepatotoxic drugs  - repeat LFTs in am    Leukocytosis  - cause?- stress?,  "dehydration?others  - WBCs 19.9--18.4; afebrile  - CRP is 37  - UA negative, CXR- no acute infiltrates, CT abd- no acute pathology  - c diff and stool cultures negative  - continue to monitor    Lethargy/weakness  - as per son- pt is more lethargic in the last few weeks- possible related to dehydration/diarrhea  - family was wondering if he had a stroke (has h/o CVA)  - will do CT head, although stroke less likely given the fact that he is on Eliquis  - PT eval     Chronic atrial fibrillation  Hypertension  - rate controlled on PTA Toprol XL 12.5 mg po qdaily  - continue PTA apixaban     Hypercholesterolemia   - hold PTA Lipitor for now given elevated LFTs     MGUS which is being monitored by his oncologist    # Pain Assessment:  Current Pain Score 9/6/2018   Patient currently in pain? denies   Pain score (0-10) 0   Pain location -   Pain descriptors -   Efrem s pain level was assessed and he currently denies pain.      DVT Prophylaxis: Eliquis  Code Status: Full Code    Disposition: Expected discharge in 1-2 day, pending improvement of diarrhea    Xochilt Cervantes     I have spent 35 minute taking care of Mr Ramos with >50% of time spent counseling the patient and family.    Interval History   Doing better, had only 1 episode of diarrhea today; reports \"hunger pain in his stomach\" and would like to eat more (was on clear liquids in am); denies chest pain, no SOB but reports feeling very weak; no N/V; no chest pain, no SOB; discussed with bedside RN, son and SLP    -Data reviewed today: I reviewed all new labs and imaging results over the last 24 hours. I personally reviewed the abdominal CT image(s) showing no acute pathology.    Physical Exam   Temp: 98.5  F (36.9  C) Temp src: Oral BP: 113/66   Heart Rate: 71 Resp: 12 SpO2: 97 % O2 Device: None (Room air)    Vitals:    09/05/18 1535 09/06/18 0644   Weight: 78 kg (172 lb) 80.9 kg (178 lb 5.6 oz)     Vital Signs with Ranges  Temp:  [97.3  F (36.3  C)-98.5  F " (36.9  C)] 98.5  F (36.9  C)  Heart Rate:  [60-77] 71  Resp:  [10-19] 12  BP: ()/(43-73) 113/66  SpO2:  [97 %-100 %] 97 %       Constitutional: Awake, alert, NAD  Respiratory: Bilateral air entry, no wheezing, no rales, no crackles  Cardiovascular:S1S2, RRR, systolic murmur 2/6 precordial area  GI: abdomen- soft, mild, nontender, BS present  Skin/Integumen: intact, no rashes, no cyanosis  Other:      Medications     IV infusion builder WITH additives 100 mL/hr at 09/06/18 0919     - MEDICATION INSTRUCTIONS -         apixaban ANTICOAGULANT  5 mg Oral BID     aspirin chewable tablet 81 mg  81 mg Oral QPM     budesonide  9 mg Oral Daily     [START ON 9/9/2018] cyanocobalamin  2,000 mcg Oral Q7 Days     gabapentin  600 mg Oral QPM     loperamide  4 mg Oral 4x daily     metoprolol succinate  12.5 mg Oral QPM     sodium chloride (PF)  3 mL Intracatheter Q8H       Data     Recent Labs  Lab 09/06/18  1328 09/06/18  0602 09/05/18  2240 09/05/18  1628   WBC  --  18.4*  --  19.9*   HGB  --  11.9*  --  14.4   MCV  --  94  --  92   PLT  --  272  --  366   NA  --  147*  --  138   POTASSIUM 3.4 3.3* 2.8* 2.3*   CHLORIDE  --  121*  --  104   CO2  --  20  --  23   BUN  --  27  --  38*   CR  --  1.20  --  1.66*   ANIONGAP  --  6  --  11   ULISSES  --  7.2*  --  8.5   GLC  --  87  --  93   ALBUMIN  --  2.5*  --  3.2*   PROTTOTAL  --  6.7*  --  8.5   BILITOTAL  --  1.2  --  1.2   ALKPHOS  --  323*  --  313*   ALT  --  100*  --  97*   AST  --  146*  --  118*   LIPASE  --   --   --  294   TROPI  --   --   --  0.027       Recent Results (from the past 24 hour(s))   US Abdomen Limited    Narrative    US ABDOMEN LIMITED  9/5/2018 6:38 PM      HISTORY: RUQ pain.      COMPARISON: None.    FINDINGS: The liver is normal in size and texture without focal mass.  There is no intra or extrahepatic biliary dilatation. The common bile  duct measures 0.5 cm.  The gallbladder is normal in appearance without  gallstones.  The pancreas head and body  appear normal. The tail is  obscured by bowel gas.  The right kidney measures 9.4 cm and is normal  in appearance.      Impression    IMPRESSION: Normal right upper quadrant. No gallstone or biliary  dilatation.    CLARA BHARDWAJ MD   Abd/pelvis CT no contrast - Stone Protocol    Narrative    CT ABDOMEN AND PELVIS WITHOUT CONTRAST   9/5/2018 7:57 PM     HISTORY: Abdominal pain, leukocytosis.     TECHNIQUE: Noncontrast CT abdomen and pelvis was performed. Radiation  dose for this scan was reduced using automated exposure control,  adjustment of the mA and/or kV according to patient size, or iterative  reconstruction technique.    COMPARISON: None.    FINDINGS:  Abdomen: There is mild atelectasis and scarring at the lung bases.  Coronary artery atherosclerotic calcifications. Mitral valve  calcifications. Evaluation of the solid abdominal organs is limited by  the lack of intravenous contrast. The liver, spleen, gallbladder,  pancreas, adrenal glands and kidneys are normal in appearance. There  is atherosclerotic calcification of aorta and its branches. No  aneurysm. There is no abdominal or pelvic lymph node enlargement.    Pelvis: Small and large bowel are filled with gas and fluid to the  rectum. No bowel obstruction. No evidence of inflammation. There is no  free intraperitoneal gas or fluid. There is a small posterior right  urinary bladder diverticulum. The prostate gland is mildly enlarged.      Impression    IMPRESSION: No acute abnormality. No bowel obstruction or  inflammation.    CLARA BHARDWAJ MD

## 2018-09-06 NOTE — PLAN OF CARE
Problem: Patient Care Overview  Goal: Plan of Care/Patient Progress Review  Outcome: No Change  Pt A&O x4, up with assist of 1 w/cane. BP's soft, VSS on RA otherwise. Denies pain, SOB, N/V. K 2.8, replaced, rechecked at 0600. IVF running at 100mL/hr. Waiting on stool sample results, enteric precautions maintained until further notice. Discharge pending.

## 2018-09-06 NOTE — H&P
[facility]    History and Physical  Hospitalist       Date of Admission:  9/5/2018    Assessment & Plan    Efrem Ramos is a 73 year old male with multiple medical problems 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  He has MGUS which is being followed by his oncologist.  His atrial fibrillation and history of a stroke.  He is anticoagulated with apixaban.  The patient says that he has had colitis throughout his life and he was originally told he had ulcerative colitis.  However later in life he was told that it was not ulcerative colitis but he cannot recall the diagnosis.  He says he has 4-5 loose stools per day on an ongoing basis.  He does not have blood in his stool.  Over the past 2 weeks he has had much more frequent bowel movements.  He is also had some abdominal distention and crampy pain with this.  He has not seen any blood in his stool.  He also says that he has had dysphagia to solid food for a couple of weeks.  He has been taking liquids only.  He has been having trouble swallowing his pills.  He has not had any fever, chills, night sweats, cold, cough, chest pain, shortness of breath.  He has not had any vomiting.  In 2005 year colonoscopy which showed a polyp but was otherwise unremarkable.  He says that a colonoscopy was attempted in 2015 but was aborted due to a redundant colon.  He says that he was told he could never have another colonoscopy.  I do not have that report available.  He says that he subsequently had a virtual colonoscopy.  He was reluctant to come to the hospital but his wife finally insisted that he come in this evening.  His blood pressure was 91/51 temperature 97.7 oxygen saturation 97% weight 78 kg.  He had a benign abdominal exam.  EKG showed atrial fibrillation.  CT of the abdomen was unremarkable.  Right upper quadrant ultrasound was unremarkable.  Chest x-ray showed an elevated left hemidiaphragm.  There was no  infiltrate or effusion.  Labs are notable for potassium 2.3 creatinine 1.66 magnesium 2.1 AST 97  18 CRP 37 lactic acid 1.5 lipase 294 troponin 0 0.027 white blood cell count 19.9 hemoglobin 14.4 platelets 366,000 urinalysis showed 3 white cells less than 1 red cell and a small amount of protein.  He was treated with IV fluids and is being made to the hospital.    Diarrhea, history of colitis  The patient normally has 4-5 nonbloody stools per day but is having increased frequency and abdominal cramping for 2 weeks  It is unclear what kind of colitis the patient was told he had.  He appears to have had a normal colonoscopy except for a polyp 13 years ago.  In 2015 and attempted colonoscopy was aborted and he was told he could never have another one because he had a redundant colon.  Stool studies were ordered, and we will ask GI to see him tomorrow    Dysphagia to solid food   Speech therapy and GI consultations    Hypokalemia   Replace per protocol    Acute kidney injury   Probably prerenal  Continue with IV fluid and recheck creatinine tomorrow    Leukocytosis  Daily due to stress and possibly dehydration.  CRP is 37.  Repeat tomorrow.    Elevated liver transaminases    Total bilirubin is 1.2, ALT 97, , alkaline phosphatase 313 and lipase 294  Etiology unclear but may be due to his statin  Upper quadrant ultrasound negative  Repeat tomorrow morning    Chronic atrial fibrillation and hypertension  Continue metoprolol and apixaban    Hypercholesterolemia on statin therapy  Liver transaminases are elevated.  Continue to monitor    MGUS which is being monitored by his oncologist    DVT Prophylaxis: on anticoagulation   Code Status: Full Code    Disposition: Expected discharge TBD     Aidan Zazueta MD    Primary Care Physician   Danny Paige MD    Chief Complaint   Diarrhea     History is obtained from the patient    History of Present Illness   Efrem Ramos is a 73 year old male  with multiple medical problems 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  He has MGUS which is being followed by his oncologist.  His atrial fibrillation and history of a stroke.  He is anticoagulated with apixaban.  The patient says that he has had colitis throughout his life and he was originally told he had ulcerative colitis.  However later in life he was told that it was not ulcerative colitis but he cannot recall the diagnosis.  He says he has 4-5 loose stools per day on an ongoing basis.  He does not have blood in his stool.  Over the past 2 weeks he has had much more frequent bowel movements.  He is also had some abdominal distention and crampy pain with this.  He has not seen any blood in his stool.  He also says that he has had dysphagia to solid food for a couple of weeks.  He has been taking liquids only.  He has been having trouble swallowing his pills.  He has not had any fever, chills, night sweats, cold, cough, chest pain, shortness of breath.  He has not had any vomiting.  In 2005 year colonoscopy which showed a polyp but was otherwise unremarkable.  He says that a colonoscopy was attempted in 2015 but was aborted due to a redundant colon.  He says that he was told he could never have another colonoscopy.  I do not have that report available.  He says that he subsequently had a virtual colonoscopy.  He was reluctant to come to the hospital but his wife finally insisted that he come in this evening.  His blood pressure was 91/51 temperature 97.7 oxygen saturation 97% weight 78 kg.  He had a benign abdominal exam.  EKG showed atrial fibrillation.  CT of the abdomen was unremarkable.  Right upper quadrant ultrasound was unremarkable.  Chest x-ray showed an elevated left hemidiaphragm.  There was no infiltrate or effusion.  Labs are notable for potassium 2.3 creatinine 1.66 magnesium 2.1 AST 97  18 CRP 37 lactic acid 1.5 lipase 294 troponin 0 0.027 white blood  cell count 19.9 hemoglobin 14.4 platelets 366,000 urinalysis showed 3 white cells less than 1 red cell and a small amount of protein.  He was treated with IV fluids and is being made to the hospital.        Past Medical History    I have reviewed this patient's medical history and updated it with pertinent information if needed.   Past Medical History:   Diagnosis Date     Atrial fibrillation (H)      Cancer (H) vocal cord     Carpal tunnel syndrome     abstracted 7/3/02.     CVA (cerebral infarction) 5/5/2015     Demyelinating disease of central nervous system, unspecified     abstracted 7/3/02.     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     Pneumonia 8/17/2016     Redundant colon     needs CT colonography     S/P coronary angiogram 09/05/2017     Shingles      SKIN DISORDERS NEC 3/15/2005     Sleep apnea        Past Surgical History   I have reviewed this patient's surgical history and updated it with pertinent information if needed.  Past Surgical History:   Procedure Laterality Date     BIOPSY  brain 2002     BONE MARROW BIOPSY, BONE SPECIMEN, NEEDLE/TROCAR N/A 6/8/2017    Procedure: BIOPSY BONE MARROW;  UNILATERAL BONE MARROW BIOPSY (CONSCIOUS SEDATION) ;  Surgeon: Jamie Gonzales MD;  Location:  GI     C NONSPECIFIC PROCEDURE  as a child    T & A. abstracted 7/3/02.     C NONSPECIFIC PROCEDURE  early    CTR. abstracted 7/3/02.     HEAD & NECK SURGERY       ORTHOPEDIC SURGERY      right arm ulna reset after injury     THORACOSCOPIC WEDGE RESECTION LUNG Right 8/2/2016    Procedure: THORACOSCOPIC WEDGE RESECTION LUNG;  Surgeon: Abdelrahman Noriega MD;  Location:  OR       Prior to Admission Medications   Prior to Admission Medications   Prescriptions Last Dose Informant Patient Reported? Taking?   ASPIRIN PO 9/4/2018 at pm  Self Yes Yes   Sig: Take 81 mg by mouth every evening    Cyanocobalamin 2000 MCG TABS 9/2/2018 at pm Self Yes No   Sig: Take 2,000 mcg by mouth every 7 days On Sundays   acetaminophen (TYLENOL) 500 MG tablet More than a month at prn Self Yes No   Sig: Take 1,000 mg by mouth every 6 hours as needed for mild pain   apixaban ANTICOAGULANT (ELIQUIS) 5 MG tablet 9/5/2018 at am Self No Yes   Sig: Take 1 tablet (5 mg) by mouth 2 times daily   atorvastatin (LIPITOR) 40 MG tablet 9/4/2018 at pm Self No Yes   Sig: Take 1 tablet (40 mg) by mouth daily   Patient taking differently: Take 40 mg by mouth every evening    gabapentin (NEURONTIN) 300 MG capsule 9/4/2018 at pm Self No Yes   Sig: Take 2 capsules (600 mg) by mouth every evening   metoprolol succinate (TOPROL-XL) 25 MG 24 hr tablet 9/4/2018 at pm Self No Yes   Sig: Take 0.5 tablets (12.5 mg) by mouth daily   Patient taking differently: Take 12.5 mg by mouth every evening    multivitamin (OCUVITE) TABS 9/5/2018 at am Self Yes Yes   Sig: Take 1 tablet by mouth 2 times daily       Facility-Administered Medications: None     Allergies   Allergies   Allergen Reactions     Adhesive Tape Blisters     Amiodarone      Lasix [Furosemide] Other (See Comments)     Throat swelling, wheezing     Penicillins Unknown     Sulfa Drugs Difficulty breathing       Social History   I have reviewed this patient's social history and updated it with pertinent information if needed. Efrem DUMONT Richard  reports that he quit smoking about 5 years ago. His smoking use included Cigarettes. He has a 30.00 pack-year smoking history. He has never used smokeless tobacco. He reports that he drinks about 25.2 oz of alcohol per week  He reports that he does not use illicit drugs.    Family History   I have reviewed this patient's family history and updated it with pertinent information if needed.   Family History   Problem Relation Age of Onset     Blood Disease Mother      Anemia     Cardiovascular Father       Cancer - colorectal Maternal Grandfather      Hypertension Brother      Diabetes Sister 5     Constance Diabetes passed at 36     Respiratory Other      Lung Cancer       Review of Systems   The 10 point Review of Systems is negative other than noted in the HPI or here.     Physical Exam   Temp: 97.7  F (36.5  C) Temp src: Oral BP: 90/66   Heart Rate: 77 Resp: 10 SpO2: 100 % O2 Device: None (Room air)    Vital Signs with Ranges  Temp:  [97.7  F (36.5  C)-97.8  F (36.6  C)] 97.7  F (36.5  C)  Pulse:  [65] 65  Heart Rate:  [55-77] 77  Resp:  [10-16] 10  BP: ()/(43-87) 90/66  SpO2:  [97 %-100 %] 100 %  172 lbs 0 oz    Constitutional: Awake, alert, cooperative, no apparent distress.  Eyes: Conjunctiva and pupils examined and normal.  HEENT: Moist mucous membranes, normal dentition.  Respiratory: Clear to auscultation bilaterally, no crackles or wheezing.  Cardiovascular: Regular rate and rhythm, normal S1 and S2, and no murmur noted.  GI: Soft, non-distended, non-tender, normal bowel sounds.  Lymph/Hematologic: No anterior cervical or supraclavicular adenopathy.  Skin: No rashes, no cyanosis, no edema.  Musculoskeletal: No joint swelling, erythema or tenderness.  Neurologic: Cranial nerves 2-12 intact, normal strength and sensation.  Psychiatric: Alert, oriented to person, place and time, no obvious anxiety or depression.    Data   Data reviewed today:  I personally reviewed no images or EKG's today.    Recent Labs  Lab 09/05/18  1628   WBC 19.9*   HGB 14.4   MCV 92         POTASSIUM 2.3*   CHLORIDE 104   CO2 23   BUN 38*   CR 1.66*   ANIONGAP 11   ULISSES 8.5   GLC 93   ALBUMIN 3.2*   PROTTOTAL 8.5   BILITOTAL 1.2   ALKPHOS 313*   ALT 97*   *   LIPASE 294   TROPI 0.027       Recent Results (from the past 24 hour(s))   XR Chest 2 Views    Narrative    CHEST TWO VIEWS  9/5/2018 5:11 PM     HISTORY: Weakness, cough.     COMPARISON: 8/7/2016      Impression    IMPRESSION: Elevated left diaphragm.  Interval clearing of interstitial  infiltrates noted on the previous exam. No acute infiltrates or  pleural effusion. Mitral annulus calcification.    RAJI THAKUR MD   US Abdomen Limited    Narrative    US ABDOMEN LIMITED  9/5/2018 6:38 PM      HISTORY: RUQ pain.      COMPARISON: None.    FINDINGS: The liver is normal in size and texture without focal mass.  There is no intra or extrahepatic biliary dilatation. The common bile  duct measures 0.5 cm.  The gallbladder is normal in appearance without  gallstones.  The pancreas head and body appear normal. The tail is  obscured by bowel gas.  The right kidney measures 9.4 cm and is normal  in appearance.      Impression    IMPRESSION: Normal right upper quadrant. No gallstone or biliary  dilatation.   Abd/pelvis CT no contrast - Stone Protocol    Narrative    CT ABDOMEN AND PELVIS WITHOUT CONTRAST   9/5/2018 7:57 PM     HISTORY: Abdominal pain, leukocytosis.     TECHNIQUE: Noncontrast CT abdomen and pelvis was performed. Radiation  dose for this scan was reduced using automated exposure control,  adjustment of the mA and/or kV according to patient size, or iterative  reconstruction technique.    COMPARISON: None.    FINDINGS:  Abdomen: There is mild atelectasis and scarring at the lung bases.  Coronary artery atherosclerotic calcifications. Mitral valve  calcifications. Evaluation of the solid abdominal organs is limited by  the lack of intravenous contrast. The liver, spleen, gallbladder,  pancreas, adrenal glands and kidneys are normal in appearance. There  is atherosclerotic calcification of aorta and its branches. No  aneurysm. There is no abdominal or pelvic lymph node enlargement.    Pelvis: Small and large bowel are filled with gas and fluid to the  rectum. No bowel obstruction. No evidence of inflammation. There is no  free intraperitoneal gas or fluid. There is a small posterior right  urinary bladder diverticulum. The prostate gland is mildly enlarged.       Impression    IMPRESSION: No acute abnormality. No bowel obstruction or  inflammation.

## 2018-09-06 NOTE — CONSULTS
"[facility]  Gastroenterology Consultation    Efrem Ramos  [address]  [facility] [address]  73 year old male    Admission Date/Time: 9/5/2018  Primary Care Provider: Danny Paige    We were asked to see the patient in consultation by Dr. Zazueta for evaluation of chronic diarrhea.        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  He has MGUS which is being followed by his oncologist. He also has a history of atrial fibrillation and stroke, on apixaban.  The patient has a long history of \"colitis\".  He reports being told at one time that he had \"ulcerative colitis\" but was later told this is not the case.  The patient reports 4-5 watery stools per day.  He denies hematochezia or melena. His bowel movements have been more frequent in the past 2 weeks.      His last colonoscopy in 8/2013 was incomplete due to a redundant colon.  Random biopsies were obtained for diarrhea and he was found to have collagenous colitis.  He also had one adenomatous polyp and was recommended to return for a repeat colonoscopy in 8/2018.  He had  a virtual colonoscopy after his last colonoscopy in 9/2013 which was normal except for scattered sigmoid diveticulosis.      The patient reports 4-5 watery stool per day.  He denies abdominal pain.  He has no hematochezia or melena.  Stool studies are negative for evidence of infection.  The patient also complains of onset of dysphagia to pills and solid food in the last 2 weeks.  He reports a \"choking\" sensation with the inability to breathe when food gets stuck.  He has a speech evaluation ordered.    ROS: A comprehensive ten point review of systems was negative aside from those in mentioned in the HPI.      MEDICATIONS:   No current facility-administered medications on file prior to encounter.   Current Outpatient Prescriptions on File Prior to Encounter:  apixaban " ANTICOAGULANT (ELIQUIS) 5 MG tablet Take 1 tablet (5 mg) by mouth 2 times daily   ASPIRIN PO Take 81 mg by mouth every evening    atorvastatin (LIPITOR) 40 MG tablet Take 1 tablet (40 mg) by mouth daily (Patient taking differently: Take 40 mg by mouth every evening )   gabapentin (NEURONTIN) 300 MG capsule Take 2 capsules (600 mg) by mouth every evening   metoprolol succinate (TOPROL-XL) 25 MG 24 hr tablet Take 0.5 tablets (12.5 mg) by mouth daily (Patient taking differently: Take 12.5 mg by mouth every evening )   multivitamin (OCUVITE) TABS Take 1 tablet by mouth 2 times daily    acetaminophen (TYLENOL) 500 MG tablet Take 1,000 mg by mouth every 6 hours as needed for mild pain   Cyanocobalamin 2000 MCG TABS Take 2,000 mcg by mouth every 7 days On Sundays       ALLERGIES:   Allergies   Allergen Reactions     Adhesive Tape Blisters     Amiodarone      Lasix [Furosemide] Other (See Comments)     Throat swelling, wheezing     Penicillins Unknown     Sulfa Drugs Difficulty breathing       Past Medical History:   Diagnosis Date     Atrial fibrillation (H)      Cancer (H) vocal cord     Carpal tunnel syndrome     abstracted 7/3/02.     CVA (cerebral infarction) 5/5/2015     Demyelinating disease of central nervous system, unspecified     abstracted 7/3/02.     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     Pneumonia 8/17/2016     Redundant colon     needs CT colonography     S/P coronary angiogram 09/05/2017     Shingles      SKIN DISORDERS NEC 3/15/2005     Sleep apnea        Past Surgical History:   Procedure Laterality Date     BIOPSY  brain 2002     BONE MARROW BIOPSY, BONE SPECIMEN, NEEDLE/TROCAR N/A 6/8/2017    Procedure: BIOPSY BONE MARROW;  UNILATERAL BONE MARROW BIOPSY (CONSCIOUS SEDATION) ;  Surgeon: Jamie Gonzales MD;  Location:   "GI     C NONSPECIFIC PROCEDURE  as a child    T & A. abstracted 7/3/02.     C NONSPECIFIC PROCEDURE  early    CTR. abstracted 7/3/02.     HEAD & NECK SURGERY       ORTHOPEDIC SURGERY      right arm ulna reset after injury     THORACOSCOPIC WEDGE RESECTION LUNG Right 8/2/2016    Procedure: THORACOSCOPIC WEDGE RESECTION LUNG;  Surgeon: Abdelrahman Noriega MD;  Location:  OR         SOCIAL HISTORY:  Social History   Substance Use Topics     Smoking status: Former Smoker     Packs/day: 1.00     Years: 30.00     Types: Cigarettes     Quit date: 7/26/2013     Smokeless tobacco: Never Used     Alcohol use 25.2 oz/week     42 Standard drinks or equivalent per week      Comment: occ       FAMILY HISTORY:  Family History   Problem Relation Age of Onset     Blood Disease Mother      Anemia     Cardiovascular Father      Cancer - colorectal Maternal Grandfather      Hypertension Brother      Diabetes Sister 5     Juvinile Diabetes passed at 36     Respiratory Other      Lung Cancer       PHYSICAL EXAM:   BP 92/68 (BP Location: Left arm)  Temp 97.3  F (36.3  C) (Axillary)  Resp 12  Ht 1.727 m (5' 8\")  Wt 80.9 kg (178 lb 5.6 oz)  SpO2 98%  BMI 27.12 kg/m2    Constitutional: NAD, comfortable  Cardiovascular: RRR, normal S1 and S2, no r/c/g/m  Respiratory: CTAB  Psychiatric: mentation appears normal and affect normal  Head: Normocephalic. Atraumatic.    Neck: Neck supple. No adenopathy. Thyroid symmetric, normal size, trachea midline  Eyes:  PERRL, no icterus  ENT: Hearing adequate, pharynx normal without erythema or exudate  Abdomen:   Auscultation: + BS  Appearance: non-distended  Palpation: non-tender  NEURO: grossly negative  SKIN: no suspicious lesions or rashes  LYMPH:   anterior cervical: no adenopathy  posterior cervical: no adenopathy  supraclavicular: no adenopathy          ADDITIONAL COMMENTS:   I reviewed the patient's new clinical lab test results.   Recent Labs   Lab Test  09/06/18   0602  09/05/18   1628 " " 03/21/18   1303   09/05/17   0950   08/01/16   1733   09/04/15   1040   WBC  18.4*  19.9*  11.6*   < >  10.9   < >  14.9*   < >  9.4   HGB  11.9*  14.4  13.0*   < >  13.0*   < >  11.8*   < >  12.9*   MCV  94  92  97   < >  93   < >  94   < >  99   PLT  272  366  293   < >  302   < >  328   < >  350   INR   --    --    --    --   1.02   --   1.17*   --   1.02    < > = values in this interval not displayed.     Recent Labs   Lab Test  09/06/18   0602  09/05/18   2240  09/05/18   1628  03/21/18   1303   NA  147*   --   138  141   POTASSIUM  3.3*  2.8*  2.3*  3.9   CHLORIDE  121*   --   104  108   CO2  20   --   23  28   BUN  27   --   38*  8   CR  1.20   --   1.66*  0.82   ANIONGAP  6   --   11  5   ULISSES  7.2*   --   8.5  8.3*   GLC  87   --   93  84     Recent Labs   Lab Test  09/06/18   0602  09/05/18   1905  09/05/18   1628  03/21/18   1303   06/14/16   1252   ALBUMIN  2.5*   --   3.2*  3.0*   < >  2.8*   BILITOTAL  1.2   --   1.2  0.9   < >  0.7   DBIL  0.5*   --    --    --    --   0.3*   ALT  100*   --   97*  14   < >  46   AST  146*   --   118*  19   < >  46*   ALKPHOS  323*   --   313*  125   < >  102   PROTEIN   --   10*   --    --    --    --    LIPASE   --    --   294   --    --    --     < > = values in this interval not displayed.             .    CONSULTATION ASSESSMENT AND PLAN:      Pt with longstanding history of diarrhea.  Previously told had \"colitis\".  Symptoms are most consistent with microscopic colitis.  Biopsies on colonoscopy in 2013 showed collagenous colitis.  Stool studies are negative for infection.  CT of A/P and US are negative.    -  Will check thyroid levels, as well as, celiac panel.  -  Recommend treatment with budesonide x 8 weeks.  -  IVF, diet as tolerated.  -  Speech evaluation for dysphagia, most consistent with oropharyngeal, however, will consider upper endoscopy for evaluation of the esophagus if needed.        I discussed the patient's findings and plan with Dr. Novoa.  "         CHANDLER Castillo  [facility]  Office:  [phone] call if needed after 5PM  Cell:  [phone], not available after 5PM at this number    GI Attending Note:  Chart reviewed, patient interviewed and examined.  Chronic, severe diarrhea with prior history of collagenous colitis, now started on Entocort.  He also has dysphagia, even occasionally for liquids, with a history of vocal cord carcinoma in remission and a history of demyelinating disease of CNS.  PE: VSS, afebrile. He appears in NAD.  Abd: soft, nontender, no masses, no organomegatly, +bs.  Labs as noted.    A/P: agree with Tonya ADAMS's assessment and plan.  Will follow on Entocort.  Await speech pathology swallowing evaluation next.    Yudy Novoa MD  [facility]  Office: [phone]  Cell:  [phone]  Monday through Thursday 8am to 5pm, Friday 8am to 4pm

## 2018-09-06 NOTE — PLAN OF CARE
Problem: Patient Care Overview  Goal: Plan of Care/Patient Progress Review  Discharge Planner SLP   Patient plan for discharge: Patient did not state.  Current status: Bedside swallow evaluation completed. Patient presents with mild to moderate oral and pharyngeal dysphagia at bedside. Patient has a history for vocal cord cancer that was removed. He can not recall if it was his right or left VC. His voice is hoarse with a stratchy vocal quality. He reports globus sensation when eating solids and choking on pills recently. He completed an OP video in 2015, with similiar complaints. It was WFL's, but noted a promient cricopharyngeus muscle. He demonstrated premature entry of thin liquids with mild delay, no overt Sx of aspiration but suspect some pharyngeal residue. Pudding was tolerated without difficulty 2 swallows to clear. Mastication was mildly prolonged for a semi-solid with 2-3 swallows to clear, and reported globus sensation that cleared with a liquid rinse. Recommend; 1. If ok by GI/MD may advance diet to a DDL 1 with thin liquids. 2. Upright, no straws, small bites/sips, hard double swallow and alternate liquids/solids. Crush med-large pills if able. 3. Would complete a video swallow study to fully assess pharyngeal function and source of his globus sensation, if ok by GI/MD.   Barriers to return to prior living situation: Generalized weakness.  Recommendations for discharge: Per medical team.  Rationale for recommendations: Pending outcome and progress.        Entered by: Do Issa 09/06/2018 10:46 AM

## 2018-09-07 ENCOUNTER — APPOINTMENT (OUTPATIENT)
Dept: GENERAL RADIOLOGY | Facility: CLINIC | Age: 73
DRG: 392 | End: 2018-09-07
Attending: INTERNAL MEDICINE
Payer: MEDICARE

## 2018-09-07 ENCOUNTER — APPOINTMENT (OUTPATIENT)
Dept: PHYSICAL THERAPY | Facility: CLINIC | Age: 73
DRG: 392 | End: 2018-09-07
Attending: INTERNAL MEDICINE
Payer: MEDICARE

## 2018-09-07 ENCOUNTER — APPOINTMENT (OUTPATIENT)
Dept: SPEECH THERAPY | Facility: CLINIC | Age: 73
DRG: 392 | End: 2018-09-07
Payer: MEDICARE

## 2018-09-07 LAB
ALBUMIN SERPL-MCNC: 2.4 G/DL (ref 3.4–5)
ALP SERPL-CCNC: 312 U/L (ref 40–150)
ALT SERPL W P-5'-P-CCNC: 131 U/L (ref 0–70)
ANION GAP SERPL CALCULATED.3IONS-SCNC: 10 MMOL/L (ref 3–14)
AST SERPL W P-5'-P-CCNC: 188 U/L (ref 0–45)
BILIRUB DIRECT SERPL-MCNC: 0.6 MG/DL (ref 0–0.2)
BILIRUB SERPL-MCNC: 1.3 MG/DL (ref 0.2–1.3)
BUN SERPL-MCNC: 13 MG/DL (ref 7–30)
CALCIUM SERPL-MCNC: 7 MG/DL (ref 8.5–10.1)
CHLORIDE SERPL-SCNC: 113 MMOL/L (ref 94–109)
CO2 SERPL-SCNC: 20 MMOL/L (ref 20–32)
CREAT SERPL-MCNC: 1.06 MG/DL (ref 0.66–1.25)
ERYTHROCYTE [DISTWIDTH] IN BLOOD BY AUTOMATED COUNT: 14.6 % (ref 10–15)
GFR SERPL CREATININE-BSD FRML MDRD: 68 ML/MIN/1.7M2
GLUCOSE SERPL-MCNC: 77 MG/DL (ref 70–99)
HCT VFR BLD AUTO: 33.6 % (ref 40–53)
HGB BLD-MCNC: 11.7 G/DL (ref 13.3–17.7)
MCH RBC QN AUTO: 33.1 PG (ref 26.5–33)
MCHC RBC AUTO-ENTMCNC: 34.8 G/DL (ref 31.5–36.5)
MCV RBC AUTO: 95 FL (ref 78–100)
PLATELET # BLD AUTO: 262 10E9/L (ref 150–450)
POTASSIUM SERPL-SCNC: 3.4 MMOL/L (ref 3.4–5.3)
PROT SERPL-MCNC: 6.3 G/DL (ref 6.8–8.8)
RBC # BLD AUTO: 3.54 10E12/L (ref 4.4–5.9)
SODIUM SERPL-SCNC: 143 MMOL/L (ref 133–144)
WBC # BLD AUTO: 12.7 10E9/L (ref 4–11)

## 2018-09-07 PROCEDURE — 92526 ORAL FUNCTION THERAPY: CPT | Mod: GN | Performed by: SPEECH-LANGUAGE PATHOLOGIST

## 2018-09-07 PROCEDURE — 25000132 ZZH RX MED GY IP 250 OP 250 PS 637: Mod: GY | Performed by: INTERNAL MEDICINE

## 2018-09-07 PROCEDURE — A9270 NON-COVERED ITEM OR SERVICE: HCPCS | Mod: GY | Performed by: INTERNAL MEDICINE

## 2018-09-07 PROCEDURE — 99232 SBSQ HOSP IP/OBS MODERATE 35: CPT | Performed by: INTERNAL MEDICINE

## 2018-09-07 PROCEDURE — A9270 NON-COVERED ITEM OR SERVICE: HCPCS | Mod: GY | Performed by: HOSPITALIST

## 2018-09-07 PROCEDURE — 25000132 ZZH RX MED GY IP 250 OP 250 PS 637: Mod: GY | Performed by: HOSPITALIST

## 2018-09-07 PROCEDURE — 92611 MOTION FLUOROSCOPY/SWALLOW: CPT | Mod: GN | Performed by: SPEECH-LANGUAGE PATHOLOGIST

## 2018-09-07 PROCEDURE — 36415 COLL VENOUS BLD VENIPUNCTURE: CPT | Performed by: INTERNAL MEDICINE

## 2018-09-07 PROCEDURE — 97530 THERAPEUTIC ACTIVITIES: CPT | Mod: GP

## 2018-09-07 PROCEDURE — 25000132 ZZH RX MED GY IP 250 OP 250 PS 637: Mod: GY | Performed by: PHYSICIAN ASSISTANT

## 2018-09-07 PROCEDURE — 97116 GAIT TRAINING THERAPY: CPT | Mod: GP

## 2018-09-07 PROCEDURE — A9270 NON-COVERED ITEM OR SERVICE: HCPCS | Mod: GY | Performed by: PHYSICIAN ASSISTANT

## 2018-09-07 PROCEDURE — 40000225 ZZH STATISTIC SLP WARD VISIT: Performed by: SPEECH-LANGUAGE PATHOLOGIST

## 2018-09-07 PROCEDURE — 80048 BASIC METABOLIC PNL TOTAL CA: CPT | Performed by: INTERNAL MEDICINE

## 2018-09-07 PROCEDURE — 80076 HEPATIC FUNCTION PANEL: CPT | Performed by: INTERNAL MEDICINE

## 2018-09-07 PROCEDURE — 25000128 H RX IP 250 OP 636: Performed by: HOSPITALIST

## 2018-09-07 PROCEDURE — 12000000 ZZH R&B MED SURG/OB

## 2018-09-07 PROCEDURE — 85027 COMPLETE CBC AUTOMATED: CPT | Performed by: INTERNAL MEDICINE

## 2018-09-07 PROCEDURE — 97161 PT EVAL LOW COMPLEX 20 MIN: CPT | Mod: GP

## 2018-09-07 PROCEDURE — 74230 X-RAY XM SWLNG FUNCJ C+: CPT

## 2018-09-07 PROCEDURE — 40000193 ZZH STATISTIC PT WARD VISIT

## 2018-09-07 RX ORDER — SIMETHICONE 80 MG
80 TABLET,CHEWABLE ORAL EVERY 6 HOURS PRN
Status: DISCONTINUED | OUTPATIENT
Start: 2018-09-07 | End: 2018-09-10 | Stop reason: HOSPADM

## 2018-09-07 RX ADMIN — SODIUM CHLORIDE AND POTASSIUM CHLORIDE: 4.5; 1.49 INJECTION, SOLUTION INTRAVENOUS at 07:14

## 2018-09-07 RX ADMIN — APIXABAN 5 MG: 5 TABLET, FILM COATED ORAL at 20:20

## 2018-09-07 RX ADMIN — LOPERAMIDE HYDROCHLORIDE 4 MG: 2 CAPSULE ORAL at 06:06

## 2018-09-07 RX ADMIN — LOPERAMIDE HYDROCHLORIDE 4 MG: 2 CAPSULE ORAL at 14:36

## 2018-09-07 RX ADMIN — LOPERAMIDE HYDROCHLORIDE 4 MG: 2 CAPSULE ORAL at 10:39

## 2018-09-07 RX ADMIN — POTASSIUM CHLORIDE 20 MEQ: 1500 TABLET, EXTENDED RELEASE ORAL at 12:49

## 2018-09-07 RX ADMIN — BUDESONIDE 9 MG: 9 TABLET, EXTENDED RELEASE ORAL at 08:32

## 2018-09-07 RX ADMIN — LOPERAMIDE HYDROCHLORIDE 4 MG: 2 CAPSULE ORAL at 18:14

## 2018-09-07 RX ADMIN — GABAPENTIN 600 MG: 300 CAPSULE ORAL at 20:20

## 2018-09-07 RX ADMIN — SIMETHICONE CHEW TAB 80 MG 80 MG: 80 TABLET ORAL at 17:22

## 2018-09-07 RX ADMIN — SODIUM CHLORIDE AND POTASSIUM CHLORIDE: 4.5; 1.49 INJECTION, SOLUTION INTRAVENOUS at 16:56

## 2018-09-07 RX ADMIN — ASPIRIN 81 MG 81 MG: 81 TABLET ORAL at 20:20

## 2018-09-07 RX ADMIN — APIXABAN 5 MG: 5 TABLET, FILM COATED ORAL at 08:32

## 2018-09-07 RX ADMIN — Medication 12.5 MG: at 20:20

## 2018-09-07 ASSESSMENT — ACTIVITIES OF DAILY LIVING (ADL)
ADLS_ACUITY_SCORE: 14
ADLS_ACUITY_SCORE: 13

## 2018-09-07 NOTE — PLAN OF CARE
Problem: Patient Care Overview  Goal: Plan of Care/Patient Progress Review  Outcome: No Change  A&Ox4. VSS on RA. Up with SBA. Denies pain. IVF infusing. Potassium replaced per protocol. Frequent stools continue, but pt states it has improved. Video swallow completed today, diet advanced to DD2 with thin liquids; encourage chin tuck & slow sips. Continue to monitor.

## 2018-09-07 NOTE — PLAN OF CARE
Problem: Patient Care Overview  Goal: Plan of Care/Patient Progress Review  Discharge Planner PT      PT: Orders received, eval completed, tx initiated. 73 year old male with PMH of Afib- on Eliquis, CVA, HLP, MGUS, 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  Pt lives in a multi-floor home with his spouse. Pt and spouse mutually help take care of each other. At baseline ambulates mod-I with SEC and is IND with ADLs.    Patient plan for discharge: TCU  Current status: Transfers: supine<>sit SBA, sit<>stand CGA requiring cues for safety. Pt ambulates 50ft SBA with FWW and 50ft CGA with SEC. Recommending FWW at this time for increased stability and activity tolerance when up and pt fatigues easily.  Barriers to return to prior living situation: Stairs, current mobility status, limited assistance from spouse.  Recommendations for discharge: TCU  Rationale for recommendations: Will need ongoing skilled PT intervention to improve independence and safety with functional mobility skills prior to returning home.       Entered by: Kev Rodriguez 09/07/2018 2:35 PM

## 2018-09-07 NOTE — PROGRESS NOTES
09/07/18 1400   Quick Adds   Type of Visit Initial PT Evaluation   Living Environment   Lives With spouse   Living Arrangements house   Home Accessibility stairs to enter home;bed and bath on same level;stairs within home   Number of Stairs to Enter Home 6   Number of Stairs Within Home 12   Stair Railings at Home outside, present on right side   Living Environment Comment Lives with spouse, pt and spouse acted as caretaker for eachother   Self-Care   Usual Activity Tolerance moderate   Current Activity Tolerance fair   Regular Exercise no   Equipment Currently Used at Home cane, straight;walker, rolling;raised toilet   Functional Level Prior   Ambulation 1-->assistive equipment   Transferring 1-->assistive equipment   Toileting 0-->independent   Bathing 1-->assistive equipment   Dressing 0-->independent   Eating 0-->independent   Fall history within last six months yes   Number of times patient has fallen within last six months 2   Which of the above functional risks had a recent onset or change? 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   Precautions/Limitations fall precautions   Cognitive Status Examination   Orientation orientation to person, place and time   Level of Consciousness alert   Posture    Posture Forward head position;Kyphosis   Range of Motion (ROM)   ROM Comment UE LE WNL   Strength   Strength Comments B knee ext  "5/5; B hip flex 4/5; B DF 5/5   Bed Mobility   Bed Mobility Comments SBA supine<>sit   Transfer Skills   Transfer Comments CGA sit<>stand to no AD. Requires cues to hand placement   Gait   Gait Comments SBA with FWW, decrease pace, step length, NBOS; CGA with SEC, R lean   Balance   Balance Comments SBA static sitting and standing balance   Sensory Examination   Sensory Perception Comments BLE WNL   General Therapy Interventions   Planned Therapy Interventions balance training;gait training;progressive activity/exercise;home program guidelines;risk factor education;transfer training;strengthening;neuromuscular re-education   Clinical Impression   Criteria for Skilled Therapeutic Intervention yes, treatment indicated   PT Diagnosis Impaired gait and mobility   Influenced by the following impairments Decreased activity tolerance, fatigue   Functional limitations due to impairments Decrease safety and IND with mobility   Clinical Presentation Stable/Uncomplicated   Clinical Presentation Rationale Medically stable   Clinical Decision Making (Complexity) Low complexity   Therapy Frequency` daily   Predicted Duration of Therapy Intervention (days/wks) 3 Days   Anticipated Equipment Needs at Discharge other (see comments)  (Has all DME)   Anticipated Discharge Disposition Transitional Care Facility   Risk & Benefits of therapy have been explained Yes   Patient, Family & other staff in agreement with plan of care Yes   Clinical Impression Comments Pt functioning below baseline, unable to safety return home as spouse is unable to provide full assistance   [facility] [email or web address] TM \"6 Clicks\"   2016, Trustees of [facility], under license to [employer].  All rights reserved.   6 Clicks Short Forms Basic Mobility Inpatient Short Form   [facility] AM-PAC  \"6 Clicks\" V.2 Basic Mobility Inpatient Short Form   1. Turning from your back to your side while in a flat bed without using bedrails? 4 - None   2. Moving " from lying on your back to sitting on the side of a flat bed without using bedrails? 3 - A Little   3. Moving to and from a bed to a chair (including a wheelchair)? 3 - A Little   4. Standing up from a chair using your arms (e.g., wheelchair, or bedside chair)? 3 - A Little   5. To walk in hospital room? 3 - A Little   6. Climbing 3-5 steps with a railing? 2 - A Lot   Basic Mobility Raw Score (Score out of 24.Lower scores equate to lower levels of function) 18   Total Evaluation Time   Total Evaluation Time (Minutes) 7

## 2018-09-07 NOTE — PROGRESS NOTES
"[facility]  [facility]  Gastroenterology Progress note    Interval History:      Pt still with loose stool but with some improvement.  Video swallow study this am.      Vital Signs:      BP 90/54 (BP Location: Left arm)  Temp 98.4  F (36.9  C) (Oral)  Resp 16  Ht 1.727 m (5' 8\")  Wt 80.9 kg (178 lb 5.6 oz)  SpO2 95%  BMI 27.12 kg/m2  Temp (24hrs), Av.5  F (36.9  C), Min:98.4  F (36.9  C), Max:98.5  F (36.9  C)    Patient Vitals for the past 72 hrs:   Weight   18 0644 80.9 kg (178 lb 5.6 oz)   18 1535 78 kg (172 lb)       Intake/Output Summary (Last 24 hours) at 18 0919  Last data filed at 18 2149   Gross per 24 hour   Intake              971 ml   Output                0 ml   Net              971 ml         Constitutional: NAD, comfortable  Cardiovascular: RRR, normal S1, S2   Respiratory: CTAB  Abdomen: soft, non-tender, nondistended.    Additional Comments:  ROS, FH, SH: See initial GI consult for details.    Laboratory Data:  Recent Labs   Lab Test  18   0835  18   0602  18   1628   17   0950   16   1733   09/04/15   1040   WBC  12.7*  18.4*  19.9*   < >  10.9   < >  14.9*   < >  9.4   HGB  11.7*  11.9*  14.4   < >  13.0*   < >  11.8*   < >  12.9*   MCV  95  94  92   < >  93   < >  94   < >  99   PLT  262  272  366   < >  302   < >  328   < >  350   INR   --    --    --    --   1.02   --   1.17*   --   1.02    < > = values in this interval not displayed.     Recent Labs   Lab Test  18   1328  18   0602  18   2240  18   1628  18   1303   NA   --   147*   --   138  141   POTASSIUM  3.4  3.3*  2.8*  2.3*  3.9   CHLORIDE   --   121*   --   104  108   CO2   --   20   --   23  28   BUN   --   27   --   38*  8   CR   --   1.20   --   1.66*  0.82   ANIONGAP   --   6   --   11  5   ULISSES   --   7.2*   --   8.5  8.3*     Recent Labs   Lab Test  18   0602  18   1905  18   1628  " 03/21/18   1303   06/14/16   1252   ALBUMIN  2.5*   --   3.2*  3.0*   < >  2.8*   BILITOTAL  1.2   --   1.2  0.9   < >  0.7   DBIL  0.5*   --    --    --    --   0.3*   ALT  100*   --   97*  14   < >  46   AST  146*   --   118*  19   < >  46*   ALKPHOS  323*   --   313*  125   < >  102   PROTEIN   --   10*   --    --    --    --    LIPASE   --    --   294   --    --    --     < > = values in this interval not displayed.         Assessment:    Chronic diarrhea secondary to collagenous colitis.  Started on budesonide yesterday.  Some improvement.  Dysphagia.  Currently being evaluated with video swallow study by speech.  Can consider EGD for further evaluation if warranted.  Plan:    -  Continue budesonide for total of 8 weeks.  -  Video swallow study results pending.  EGD if needed.                Tonya Abernathy, PAC  [facility]  Office:  [phone] call if needed after 5PM  Cell:  [phone], not available after 5PM at this number

## 2018-09-07 NOTE — PLAN OF CARE
Problem: Patient Care Overview  Goal: Plan of Care/Patient Progress Review  Outcome: No Change  A&O x4. VSS on RA. Up with one- gait belt and cane. Denies pain. K was 3.4 this afternoon, 20 mEq administered, per order, K to be re-checked in AM. IVF at 100 mL/hr. Enteric precautions discontinued. CT ordered and completed, as SLP noticed a slight left facial droop (patient has history of stroke). DD1 diet- no straws, patient tolerated well. Video Swallow ordered for tomorrow. Will continue to monitor.

## 2018-09-07 NOTE — PLAN OF CARE
Problem: Patient Care Overview  Goal: Plan of Care/Patient Progress Review  Discharge Planner SLP   Patient plan for discharge: Home with spouse  Current status: Video swallow study completed in radiology.  Patient presents with both oropharyngeal and sx of esophageal dysphagia.  Patient exhibited delayed swallow response with premature pharyngeal entry of liquids, moderate valleculae and pyriform sinus residue with all consistencies, and aspiration event x1 with thin liquid wash with delayed, strong cough response.  Patient exhibited good airway protection with use of chin tuck and DOUBLE SWALLOW.  There was retention of the 13 mm diameter barium tablet at the GE junction across several liquid wash trials after the pill swallow during esophageal follow through, thus, recommend GI consideration for EGD as appropriate. Recommend dysphagia diet level 2 with thin liquids with CHIN TUCK and DOUBLE SWALLOW, eating only when alert and up to chair.   Barriers to return to prior living situation: Weakness, nutrition status, confusion.   Recommendations for discharge: OP SLP follow up after discharge  Rationale for recommendations: Recommend continued SLP services for strategy training and pharyngeal strengthening program.        Entered by: Tyra Fierro 09/07/2018 9:47 AM

## 2018-09-07 NOTE — PROGRESS NOTES
VIDEO SWALLOW STUDY       09/07/18 0900   General Information   Onset Date 09/05/18   Start of Care Date 09/07/18   Referring Physician Dr. 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  He has MGUS which is being followed by his oncologist.  His atrial fibrillation and history of a stroke.  He is anticoagulated with apixaban.  The patient says that he has had colitis throughout his life and he was originally told he had ulcerative colitis.  However later in life he was told that it was not ulcerative colitis but he cannot recall the diagnosis.  He says he has 4-5 loose stools per day on an ongoing basis.  He does not have blood in his stool.  Over the past 2 weeks he has had much more frequent bowel movements.  He is also had some abdominal distention and crampy pain with this.  He has not seen any blood in his stool.  He also says that he has had dysphagia to solid food for a couple of weeks.  He has been taking liquids only.  He has been having trouble swallowing his pills.  He has not had any fever, chills, night sweats, cold, cough, chest pain, shortness of breath.  He has not had any vomiting. Patient reports hx of dysphagia since the 1990s when he had encephalopathy and needed to rehabilitate his swallowing.     VFSS Eval: Thin Liquid Texture Trial   Mode of Presentation, Thin Liquid cup;self-fed   Preparatory Phase WFL   Oral Phase, Thin Liquid Premature pharyngeal entry   Pharyngeal Phase, Thin Liquid Residue in valleculae;Residue in  pyriform sinus   Rosenbek's Penetration Aspiration Scale: Thin Liquid Trial Results 6 - contrast passes glottis, no subglottic residue remains (aspiration)   Response to Aspiration productive volitional cough following clinician cue   Successful Strategies Trialed During Procedure, Thin Liquid chin tuck   Diagnostic Statement Transient penetration for 3/5 trials, aspiration with delayed effective cough 1/5.     VFSS Eval: Puree Solid Texture Trial   Mode of Presentation, Puree spoon;self-fed   Preparatory Phase WFL   Oral Phase, Puree Premature pharyngeal entry   Pharyngeal Phase, Puree Residue in valleculae;Residue in pyriform sinus   Rosenbek's Penetration Aspiration Scale: Puree Food Trial Results 1 - no aspiration, contrast does not enter airway   Diagnostic Statement Moderate pharyngeal residue   VFSS Eval: Solid Food Texture Trial   Mode of Presentation, Solid spoon;fed by clinician   Preparatory Phase WFL   Oral Phase, Solid Premature pharyngeal entry   Pharyngeal Phase, Solid Residue in valleculae;Residue in pyriform sinus   Rosenbek's Penetration Aspiration Scale: Solid Food Trial Results 1 - no aspiration, contrast does not enter airway   Diagnostic Statement Moderate pharyngeal residue   Esophageal Phase of Swallow   Patient reports or presents with symptoms of esophageal dysphagia Yes   Esophageal comments On video 2015 noted to have prominent cricopharyngeus muscle.    General Therapy Interventions   Planned Therapy Interventions Dysphagia Treatment   Dysphagia treatment Modified diet education;Instruction of safe swallow strategies   Swallow Eval: Clinical Impressions   Skilled Criteria for Therapy Intervention Skilled criteria met.  Treatment indicated.   Functional Assessment Scale (FAS) 3   Dysphagia Outcome Severity Scale (GONZALEZ) Level 3 - GONZALEZ   Treatment Diagnosis Moderate oropharyngeal dysphagia, distal esophageal pill retention   Diet texture recommendations Thin liquids;Dysphagia diet level 2    Recommended Feeding/Eating Techniques hard swallow w/ each bite or sip;maintain upright posture during/after eating for 30 mins;no straws;small sips/bites;tuck chin during every swallow  (Double swallow)   Demonstrates Need for Referral to Another Service dietitian   Therapy Frequency 5 times/wk   Predicted Duration of Therapy Intervention (days/wks) 1 week   Anticipated Discharge Disposition home w/ outpatient services  (Pending outcome)   Risks and Benefits of Treatment have been explained. Yes   Patient, family and/or staff in agreement with Plan of Care Yes   Clinical Impression Comments Patient presents with both oropharyngeal and sx of esophageal dysphagia.  Patient exhibited delayed swallow response with premature pharyngeal entry of liquids, moderate valleculae and pyriform sinus residue with all consistencies, and aspiration event x1 with thin liquid wash with delayed, strong cough response.  Patient exhibited good airway protection with use of chin tuck and DOUBLE SWALLOW.  Cricopharyngeal prominence present and stable from previous video swallow, not impeding solid foods or pills.  However, there was retention of the 13 mm diameter barium tablet at the GE junction across several liquid wash trials after the pill swallow during esophageal follow through, thus, recommend GI consideration for EGD as appropriate. Recommend dysphagia diet level 2 with thin liquids with CHIN TUCK and DOUBLE SWALLOW, eating only when alert and up to chair.    Total Evaluation Time   Total Evaluation Time (Minutes) 60

## 2018-09-07 NOTE — PLAN OF CARE
Problem: Patient Care Overview  Goal: Plan of Care/Patient Progress Review  Outcome: No Change  Pt alert/oriented. VS stable. Denies pain. Cont IVF. NDD1 diet w/thin liquids. Pt still having some loose stools but reports improvement. Plan for video swallow study 9/7 am.

## 2018-09-07 NOTE — PROGRESS NOTES
[facility]    Hospitalist Progress Note    Assessment & Plan   Efrem Ramos is a 73 year old male with PMH of Afib- on Eliquis, CVA, HLP, MGUS, 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     Diarrhea  - h/o chronic diarrhea  - last colonoscopy in 8/2013 was incomplete due to a redundant colon.  Random biopsies were obtained for diarrhea and he was found to have collagenous colitis; he had  a virtual colonoscopy after his last colonoscopy in 9/2013 which was normal except for scattered sigmoid diverticulosis- as per notes  - no associated fever, no abd pain, no blood in stool  - WBCs elevated up to 18.4, but improved today to 12.7  - CT abdomen and US abdomen- no acute pathology  - C diff negative; stool sample for enteric bacteria and viruses- negative  - GI consult appreciated  -Continue with Budesonide daily for 8 weeks and scheduled Imodium QID  - diet as tolerated  - iv fluids 1/2NS with KCl at 100cc/h     Dysphagia to solid food   Voice change  - reported difficulty swallowing solids and pills and change of voice for the last 2 weeks  - has h/o vocal cord cancer and is believed to be in remission  - SLP- rec DD2 with thin liquids, aspiration precaution, status post video swallow earlier today, does show some silent aspiration with thin liquid, see below for details  -Will continue to need outpatient speech evaluation, also awaiting further recommendation from GI about timing for possible EGD.      Hypokalemia   - likely due to diarrhea  - K replacement protocol       Acute kidney injury   - Probably prerenal due to diarrhea  -Currently on IV fluids and gradually improving renal function.  Continue to monitor     Hypernatremia  - Na 147, improved after IV fluids changed to half normal saline  -Monitor     Elevated liver transaminases    - Total bilirubin is 1.2, ALT 97--100, AST  118--146, alkaline phosphatase 313 and lipase 294  - Upper quadrant ultrasound negative  - stop PTA statin for now  - avoid hepatotoxic drugs  -Continue to monitor     Leukocytosis  - cause?- stress?, dehydration?others, improved today, patient has been afebrile  - UA negative, CXR- no acute infiltrates, CT abd- no acute pathology  - c diff and stool cultures negative  - continue to monitor     Lethargy/weakness  - as per son- pt is more lethargic in the last few weeks- possible related to dehydration/diarrhea and electrolyte imbalance  -Head CT negative, PT OT recommending TCU,      Chronic atrial fibrillation  Hypertension  - rate controlled on PTA Toprol XL 12.5 mg po qdaily  - continue PTA apixaban      Hypercholesterolemia   - hold PTA Lipitor for now given elevated LFTs      MGUS which is being monitored by his oncologist    Pain assessment :  Current Pain Score 9/7/2018   Patient currently in pain? denies   Pain score (0-10) -   Pain location -   Pain descriptors -   Efrem s pain level was assessed and he currently denies pain.      DVT Prophylaxis: Eliquis  Code Status: Full code    Disposition: Expected discharge in 1-2 days once diarrhea improves, renal function/electrolytes remained stable off IV fluids and clearance by GI.    Emeli Reyes MD  Text page (7am - 6pm)    Interval History   Patient was just back from swallow evaluation had just tried some medicine when I was going to his room and had a coughing bout at the time of my evaluation.  Started getting better before I left.  Reports diarrhea is still there but getting better and he is almost back to his baseline.  Also reports generalized weakness    -Data reviewed today: I reviewed all new labs and imaging results over the last 24 hours. I personally reviewed the Barium swallow evaluation image(s) showing See below.    Physical Exam   Temp: 98.4  F (36.9  C) Temp src: Oral BP: 90/54   Heart Rate: 67 Resp: 16 SpO2: 95 % O2 Device: None (Room air)     Vitals:    09/05/18 1535 09/06/18 0644   Weight: 78 kg (172 lb) 80.9 kg (178 lb 5.6 oz)     Vital Signs with Ranges  Temp:  [98.4  F (36.9  C)-98.5  F (36.9  C)] 98.4  F (36.9  C)  Heart Rate:  [61-75] 67  Resp:  [12-16] 16  BP: ()/(52-66) 90/54  SpO2:  [95 %-98 %] 95 %  I/O last 3 completed shifts:  In: 971 [I.V.:971]  Out: -     Exam:  Constitutional: Awake, alert and no distress. Appears comfortable  Head: Normocephalic. No masses, lesions, tenderness or abnormalities  ENT: no neck nodes or sinus tenderness  Cardiovascular: Irregular but rate controlled, 2+ murmurs, no rub or gallop no JVD  Respiratory: Normal WOB, no wheezes or crackles   Gastrointestinal: Abdomen soft, non-tender. BS normal. No masses, organomegaly  : Deferred  Extremities: No edema , no clubbing /cyanosis   Skin: Warm and dry, no rash        Medications     IV infusion builder WITH additives 100 mL/hr at 09/07/18 0714     - MEDICATION INSTRUCTIONS -         apixaban ANTICOAGULANT  5 mg Oral BID     aspirin chewable tablet 81 mg  81 mg Oral QPM     budesonide  9 mg Oral Daily     [START ON 9/9/2018] cyanocobalamin  2,000 mcg Oral Q7 Days     gabapentin  600 mg Oral QPM     loperamide  4 mg Oral 4x daily     metoprolol succinate  12.5 mg Oral QPM     sodium chloride (PF)  3 mL Intracatheter Q8H       Data     Recent Labs  Lab 09/07/18  0835 09/06/18  1328 09/06/18  0602  09/05/18  1628   WBC 12.7*  --  18.4*  --  19.9*   HGB 11.7*  --  11.9*  --  14.4   MCV 95  --  94  --  92     --  272  --  366     --  147*  --  138   POTASSIUM 3.4 3.4 3.3*  < > 2.3*   CHLORIDE 113*  --  121*  --  104   CO2 20  --  20  --  23   BUN 13  --  27  --  38*   CR 1.06  --  1.20  --  1.66*   ANIONGAP 10  --  6  --  11   ULISSES 7.0*  --  7.2*  --  8.5   GLC 77  --  87  --  93   ALBUMIN 2.4*  --  2.5*  --  3.2*   PROTTOTAL 6.3*  --  6.7*  --  8.5   BILITOTAL 1.3  --  1.2  --  1.2   ALKPHOS 312*  --  323*  --  313*   *  --  100*  --  97*   AST  188*  --  146*  --  118*   LIPASE  --   --   --   --  294   TROPI  --   --   --   --  0.027   < > = values in this interval not displayed.    Imaging:  Recent Results (from the past 24 hour(s))   CT Head w/o Contrast    Narrative    CT SCAN OF THE HEAD WITHOUT CONTRAST   9/6/2018 5:16 PM     HISTORY:  Weakness. Rule out stroke.     TECHNIQUE:  Axial images of the head and coronal reformations without  IV contrast material.  Radiation dose for this scan was reduced using  automated exposure control, adjustment of the mA and/or kV according  to patient size, or iterative reconstruction technique.    COMPARISON: None.    FINDINGS: There has been a previous left frontal tim hole. The  calvarium is otherwise intact. Vascular calcifications are seen at the  skull base. Mild to moderate cerebral atrophy is present. Minimal  nonspecific white matter changes are present. There is a low density  in the left corona radiata which is probably an old lacunar infarct,  although it is difficult to exclude a subacute infarct. There is also  an old left basal ganglia infarct. There is no evidence for  intracranial hemorrhage, mass effect, acute infarct, or skull  fracture.      Impression    IMPRESSION: Chronic changes. No evidence for intracranial hemorrhage  or any acute process. If clinically indicated, MRI of the brain  without and with contrast might be more sensitive in evaluating for a  subtle acute infarct.    JOSE F STEPHENS MD   XR Video Swallow w/o Esophagram    Narrative    VIDEO SWALLOWING EVALUATION   9/7/2018 9:29 AM     HISTORY: Status post CA of the VC and stroke. Globus sensation with  swallowing.     COMPARISON: 6/30/2015.    FLUOROSCOPY TIME: 4.1 minutes. 11 cine video clips were obtained.     FINDINGS:    Solid: Minimal residue. No aspiration or penetration.    Pill: Difficulty with oral transit. No aspiration. There was some  esophageal dysmotility. There was failure of relaxation of the lower  esophageal  sphincter during the exam. The liquid readily passed the  lower esophageal sphincter but the pill did not pass during the exam.    Pudding: Mild residue. No aspiration or penetration.    Honey: Not tested.    Nectar: Not tested.    Thin: Multiple swallows with penetration. One swallow did produce  aspiration without cough. There was moderate residue. There was  premature spillage into both the vallecula and piriform sinuses. The  penetration improved with chin tuck maneuver.    This study only includes the cervical esophagus.  For more detailed  information, please see speech therapy report.    JOSE F STEPHENS MD

## 2018-09-08 ENCOUNTER — RESULTS ONLY (OUTPATIENT)
Dept: ENDOSCOPY | Facility: CLINIC | Age: 73
End: 2018-09-08

## 2018-09-08 LAB
ALBUMIN SERPL-MCNC: 2.4 G/DL (ref 3.4–5)
ALP SERPL-CCNC: 283 U/L (ref 40–150)
ALT SERPL W P-5'-P-CCNC: 163 U/L (ref 0–70)
ANION GAP SERPL CALCULATED.3IONS-SCNC: 8 MMOL/L (ref 3–14)
AST SERPL W P-5'-P-CCNC: 213 U/L (ref 0–45)
BILIRUB SERPL-MCNC: 1.5 MG/DL (ref 0.2–1.3)
BUN SERPL-MCNC: 13 MG/DL (ref 7–30)
CALCIUM SERPL-MCNC: 7.2 MG/DL (ref 8.5–10.1)
CHLORIDE SERPL-SCNC: 115 MMOL/L (ref 94–109)
CO2 SERPL-SCNC: 18 MMOL/L (ref 20–32)
CREAT SERPL-MCNC: 0.85 MG/DL (ref 0.66–1.25)
FERRITIN SERPL-MCNC: 584 NG/ML (ref 26–388)
GFR SERPL CREATININE-BSD FRML MDRD: 88 ML/MIN/1.7M2
GLUCOSE SERPL-MCNC: 102 MG/DL (ref 70–99)
LACTATE BLD-SCNC: 1.2 MMOL/L (ref 0.7–2)
MAGNESIUM SERPL-MCNC: 1.5 MG/DL (ref 1.6–2.3)
MAGNESIUM SERPL-MCNC: 2.8 MG/DL (ref 1.6–2.3)
POTASSIUM SERPL-SCNC: 3.9 MMOL/L (ref 3.4–5.3)
PROT SERPL-MCNC: 6.4 G/DL (ref 6.8–8.8)
SODIUM SERPL-SCNC: 141 MMOL/L (ref 133–144)
UPPER GI ENDOSCOPY: NORMAL

## 2018-09-08 PROCEDURE — 80053 COMPREHEN METABOLIC PANEL: CPT | Performed by: INTERNAL MEDICINE

## 2018-09-08 PROCEDURE — 88305 TISSUE EXAM BY PATHOLOGIST: CPT | Performed by: INTERNAL MEDICINE

## 2018-09-08 PROCEDURE — A9270 NON-COVERED ITEM OR SERVICE: HCPCS | Mod: GY | Performed by: PHYSICIAN ASSISTANT

## 2018-09-08 PROCEDURE — 82728 ASSAY OF FERRITIN: CPT | Performed by: INTERNAL MEDICINE

## 2018-09-08 PROCEDURE — 86709 HEPATITIS A IGM ANTIBODY: CPT | Performed by: INTERNAL MEDICINE

## 2018-09-08 PROCEDURE — 25000128 H RX IP 250 OP 636: Performed by: HOSPITALIST

## 2018-09-08 PROCEDURE — 36415 COLL VENOUS BLD VENIPUNCTURE: CPT | Performed by: INTERNAL MEDICINE

## 2018-09-08 PROCEDURE — 43239 EGD BIOPSY SINGLE/MULTIPLE: CPT | Performed by: INTERNAL MEDICINE

## 2018-09-08 PROCEDURE — 12000000 ZZH R&B MED SURG/OB

## 2018-09-08 PROCEDURE — 25000132 ZZH RX MED GY IP 250 OP 250 PS 637: Mod: GY | Performed by: INTERNAL MEDICINE

## 2018-09-08 PROCEDURE — 0DB38ZX EXCISION OF LOWER ESOPHAGUS, VIA NATURAL OR ARTIFICIAL OPENING ENDOSCOPIC, DIAGNOSTIC: ICD-10-PCS | Performed by: INTERNAL MEDICINE

## 2018-09-08 PROCEDURE — 25000128 H RX IP 250 OP 636: Performed by: INTERNAL MEDICINE

## 2018-09-08 PROCEDURE — 25000132 ZZH RX MED GY IP 250 OP 250 PS 637: Mod: GY | Performed by: PHYSICIAN ASSISTANT

## 2018-09-08 PROCEDURE — 25000132 ZZH RX MED GY IP 250 OP 250 PS 637: Mod: GY | Performed by: HOSPITALIST

## 2018-09-08 PROCEDURE — G0500 MOD SEDAT ENDO SERVICE >5YRS: HCPCS | Performed by: INTERNAL MEDICINE

## 2018-09-08 PROCEDURE — 88305 TISSUE EXAM BY PATHOLOGIST: CPT | Mod: 26 | Performed by: INTERNAL MEDICINE

## 2018-09-08 PROCEDURE — 83735 ASSAY OF MAGNESIUM: CPT | Performed by: INTERNAL MEDICINE

## 2018-09-08 PROCEDURE — A9270 NON-COVERED ITEM OR SERVICE: HCPCS | Mod: GY | Performed by: INTERNAL MEDICINE

## 2018-09-08 PROCEDURE — A9270 NON-COVERED ITEM OR SERVICE: HCPCS | Mod: GY | Performed by: HOSPITALIST

## 2018-09-08 PROCEDURE — G0499 HEPB SCREEN HIGH RISK INDIV: HCPCS | Performed by: INTERNAL MEDICINE

## 2018-09-08 PROCEDURE — 99232 SBSQ HOSP IP/OBS MODERATE 35: CPT | Performed by: INTERNAL MEDICINE

## 2018-09-08 PROCEDURE — 83605 ASSAY OF LACTIC ACID: CPT | Performed by: INTERNAL MEDICINE

## 2018-09-08 PROCEDURE — 86803 HEPATITIS C AB TEST: CPT | Performed by: INTERNAL MEDICINE

## 2018-09-08 RX ORDER — ONDANSETRON 4 MG/1
4 TABLET, ORALLY DISINTEGRATING ORAL EVERY 6 HOURS PRN
Status: DISCONTINUED | OUTPATIENT
Start: 2018-09-08 | End: 2018-09-08

## 2018-09-08 RX ORDER — ONDANSETRON 2 MG/ML
4 INJECTION INTRAMUSCULAR; INTRAVENOUS EVERY 6 HOURS PRN
Status: DISCONTINUED | OUTPATIENT
Start: 2018-09-08 | End: 2018-09-08

## 2018-09-08 RX ORDER — FLUMAZENIL 0.1 MG/ML
0.2 INJECTION, SOLUTION INTRAVENOUS
Status: ACTIVE | OUTPATIENT
Start: 2018-09-08 | End: 2018-09-09

## 2018-09-08 RX ORDER — FENTANYL CITRATE 50 UG/ML
INJECTION, SOLUTION INTRAMUSCULAR; INTRAVENOUS PRN
Status: DISCONTINUED | OUTPATIENT
Start: 2018-09-08 | End: 2018-09-08 | Stop reason: HOSPADM

## 2018-09-08 RX ORDER — NALOXONE HYDROCHLORIDE 0.4 MG/ML
.1-.4 INJECTION, SOLUTION INTRAMUSCULAR; INTRAVENOUS; SUBCUTANEOUS
Status: ACTIVE | OUTPATIENT
Start: 2018-09-08 | End: 2018-09-09

## 2018-09-08 RX ADMIN — SIMETHICONE CHEW TAB 80 MG 80 MG: 80 TABLET ORAL at 23:40

## 2018-09-08 RX ADMIN — LOPERAMIDE HYDROCHLORIDE 4 MG: 2 CAPSULE ORAL at 18:54

## 2018-09-08 RX ADMIN — SODIUM CHLORIDE AND POTASSIUM CHLORIDE: 4.5; 1.49 INJECTION, SOLUTION INTRAVENOUS at 02:25

## 2018-09-08 RX ADMIN — LOPERAMIDE HYDROCHLORIDE 4 MG: 2 CAPSULE ORAL at 06:50

## 2018-09-08 RX ADMIN — APIXABAN 5 MG: 5 TABLET, FILM COATED ORAL at 08:02

## 2018-09-08 RX ADMIN — SODIUM CHLORIDE AND POTASSIUM CHLORIDE: 4.5; 1.49 INJECTION, SOLUTION INTRAVENOUS at 23:45

## 2018-09-08 RX ADMIN — SODIUM CHLORIDE AND POTASSIUM CHLORIDE: 4.5; 1.49 INJECTION, SOLUTION INTRAVENOUS at 13:09

## 2018-09-08 RX ADMIN — MAGNESIUM SULFATE HEPTAHYDRATE 4 G: 40 INJECTION, SOLUTION INTRAVENOUS at 10:37

## 2018-09-08 RX ADMIN — LOPERAMIDE HYDROCHLORIDE 4 MG: 2 CAPSULE ORAL at 13:06

## 2018-09-08 RX ADMIN — ASPIRIN 81 MG 81 MG: 81 TABLET ORAL at 21:07

## 2018-09-08 RX ADMIN — APIXABAN 5 MG: 5 TABLET, FILM COATED ORAL at 21:07

## 2018-09-08 RX ADMIN — GABAPENTIN 600 MG: 300 CAPSULE ORAL at 21:07

## 2018-09-08 RX ADMIN — BUDESONIDE 9 MG: 9 TABLET, EXTENDED RELEASE ORAL at 08:03

## 2018-09-08 ASSESSMENT — ACTIVITIES OF DAILY LIVING (ADL)
ADLS_ACUITY_SCORE: 13
ADLS_ACUITY_SCORE: 13
ADLS_ACUITY_SCORE: 14
ADLS_ACUITY_SCORE: 14
ADLS_ACUITY_SCORE: 13

## 2018-09-08 NOTE — PROCEDURES
INPATIENT PRE-PROCEDURE NOTE    CHIEF COMPLAINT / REASON FOR PROCEDURE:  dysphagia    History and Physical Reviewed: on chart-<30 days old; updated within 7 days.    PRE-SEDATION ASSESSMENT:   Lung Exam: normal  Heart Exam: normal  Mallampati Airway Classification: Class I (visualization of the soft palate, fauces, uvula, anterior and posterior pillars)  Previous reaction to anesthesia/sedation: NO  Sedation plan based on assessment:Moderate (conscious) sedation    Comment(s):      ASA Classification: 2 - Mild systemic disease      IMPRESSION:  Dysphagia.  Question of distal esophageal obstruction on swallow evaluation    Plan:  EGD      LU Marie MD  [facility]  Office: [phone]  Pager:  [phone] 8- 5pm on Monday-Thursday, 8-4pm Friday

## 2018-09-08 NOTE — PLAN OF CARE
Problem: Patient Care Overview  Goal: Plan of Care/Patient Progress Review  Outcome: Improving  Patient alert/orient X4, up with sba.  Lungs clear, on RA.  Had EGD today, vss except bp soft at beginning of shift.  Sepsis protocol fired, lactic 1.2, bp back to 96/52, no dizziness or lightheaded.  K+3.9, mag replaced & back at 2.8.  Continues with loose stools. Denied pain this shift.  Getting IVF.

## 2018-09-08 NOTE — PLAN OF CARE
Problem: Patient Care Overview  Goal: Plan of Care/Patient Progress Review  Outcome: Improving  A/O x4. SBA w walker. 3 loose stools overnight. No c/o pain. DDD2 thin liquid diet. PT recommending TCU at discharge. Nursing will continue to monitor.

## 2018-09-08 NOTE — PROGRESS NOTES
Pt blood pressure this evening 87/48, sepsis protocol fired, lactic acid 1.2 & recheck of bp 96/52.  Pt asymptomatic & denies any pain/dizziness.

## 2018-09-08 NOTE — PLAN OF CARE
Problem: Patient Care Overview  Goal: Plan of Care/Patient Progress Review  SLP: Dysphagia treatment session cancelled today. Pt NPO and having EGD this early afternoon. Attempted to see him in the afternoon and he requested no PO trials as his throat is still sore following his procedure. EGD report as follows -  Benign-appearing esophageal stenosis. Minimallumenal narrowing and I doubt that this is a cause of his swallowing difficulty. Esophageal spasm could be present but more difficult to diagnose and  treat. Would favor diet per speech pathology recommendations and observe response.     Will continue to follow per POC.

## 2018-09-08 NOTE — PLAN OF CARE
Problem: Patient Care Overview  Goal: Plan of Care/Patient Progress Review  Outcome: Improving  Pt is A&Ox4, pleasant and cooperative. Up SBA. To BA frequently, slightly improved per pt. Had abdominal cramping this tyler, medication prn given with some improvement. Is available q6hr. IVF infusing. PT recommend TCU at discharge. A-1, cane. DD2, thins. Encouraging careful swallowing, dip of chin. Pt sometimes still coughs with med admin with thin liquids. To try applesauce/pudding/thickened liquids at next med admin. Potassium recheck in AM. EGD still being considered for swallowing difficulty r/t past vocal cord cancer. Nursing will continue to monitor.

## 2018-09-08 NOTE — PROGRESS NOTES
[facility]    Hospitalist Progress Note    Assessment & Plan   Efrem Ramos is a 73 year old male with PMH of Afib- on Eliquis, CVA, HLP, MGUS, 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     Diarrhea  - h/o chronic diarrhea  - last colonoscopy in 8/2013 was incomplete due to a redundant colon.  Random biopsies were obtained for diarrhea and he was found to have collagenous colitis; he had  a virtual colonoscopy after his last colonoscopy in 9/2013 which was normal except for scattered sigmoid diverticulosis- as per notes  - no associated fever, no abd pain, no blood in stool  - WBCs elevated up to 18.4, but gradually improving   - CT abdomen and US abdomen- no acute pathology  - C diff negative; stool sample for enteric bacteria and viruses- negative  - GI consult appreciated  -Continue with Budesonide daily for 8 weeks and scheduled Imodium QID  - diet as tolerated  - iv fluids 1/2NS with KCl at 100cc/h, stop IVF once diarrhea improves     Dysphagia to solid food   Voice change  - reported difficulty swallowing solids and pills and change of voice for the last 2 weeks  - has h/o vocal cord cancer and is believed to be in remission  - SLP- rec DD2 with thin liquids, aspiration precaution, status post video swallow earlier today, does show some silent aspiration with thin liquid, see below for details  -Will continue to need outpatient speech evaluation and treatment, s/p  EGD 9/8/18-.Minimal schatzki's ring :  Biopsied for disruption  Tertiary esophageal contractions ?esophageal spasm.Exam otherwise negative.      Hypokalemia   - likely due to diarrhea  - K replacement protocol       Acute kidney injury   - Probably prerenal due to diarrhea  -Currently on IV fluids and renal function now improved.  Continue to monitor     Hypernatremia  - Na 147, improved after IV fluids changed to  half normal saline  -Monitor     Elevated liver transaminases    - Total bilirubin is 1.2, ALT 97--100, --146, alkaline phosphatase 313 and lipase 294  - Upper quadrant ultrasound negative  - stop PTA statin for now  - avoid hepatotoxic drugs  -Continue to monitor     Leukocytosis  - cause?- stress?, dehydration?others, improved today, patient has been afebrile  - UA negative, CXR- no acute infiltrates, CT abd- no acute pathology  - c diff and stool cultures negative  - continue to monitor     Lethargy/weakness  - as per son- pt is more lethargic in the last few weeks- possible related to dehydration/diarrhea and electrolyte imbalance  -Head CT negative, PT OT recommending TCU,      Chronic atrial fibrillation  Hypertension  - rate controlled on PTA Toprol XL 12.5 mg po qdaily  - continue PTA apixaban      Hypercholesterolemia   - hold PTA Lipitor for now given elevated LFTs      MGUS which is being monitored by his oncologist    Pain assessment :  Current Pain Score 9/8/2018   Patient currently in pain? denies   Pain score (0-10) -   Pain location -   Pain descriptors -   Efrem s pain level was assessed and he currently denies pain.      DVT Prophylaxis: Eliquis  Code Status: Full code    Disposition: Expected discharge in 1-2 days once diarrhea improves, renal function/electrolytes remained stable off IV fluids and clearance by GI.    Emeli Reyes MD  Text page (7am - 6pm)    Interval History   Patient  Had EGD today , see procedure report for details , had terrible night , still having liquidy diarrhea which kept him awake throughout night  -Data reviewed today: I reviewed all new labs and imaging results over the last 24 hours. I personally reviewed the Barium swallow evaluation image(s) showing See below.    Physical Exam   Temp: 97.6  F (36.4  C) Temp src: Oral BP: 95/64   Heart Rate: 60 Resp: 11 SpO2: 97 % O2 Device: None (Room air)    Vitals:    09/05/18 1535 09/06/18 0644 09/08/18 0630   Weight: 78  kg (172 lb) 80.9 kg (178 lb 5.6 oz) 81.4 kg (179 lb 6.4 oz)     Vital Signs with Ranges  Temp:  [97.4  F (36.3  C)-98.8  F (37.1  C)] 97.6  F (36.4  C)  Heart Rate:  [57-90] 60  Resp:  [11-20] 11  BP: ()/(58-85) 95/64  SpO2:  [94 %-98 %] 97 %  I/O last 3 completed shifts:  In: 320 [P.O.:320]  Out: -     Exam:  Constitutional: Awake, alert and no distress. Appears comfortable  Head: Normocephalic. No masses, lesions, tenderness or abnormalities  ENT: no neck nodes or sinus tenderness  Cardiovascular: Irregular but rate controlled, 2+ murmurs, no rub or gallop no JVD  Respiratory: Normal WOB, no wheezes or crackles   Gastrointestinal: Abdomen soft, non-tender. BS normal. No masses, organomegaly  : Deferred  Extremities: No edema , no clubbing /cyanosis   Skin: Warm and dry, no rash        Medications     IV infusion builder WITH additives 100 mL/hr at 09/08/18 1309     - MEDICATION INSTRUCTIONS -       - MEDICATION INSTRUCTIONS -         apixaban ANTICOAGULANT  5 mg Oral BID     aspirin chewable tablet 81 mg  81 mg Oral QPM     budesonide  9 mg Oral Daily     [START ON 9/9/2018] cyanocobalamin  2,000 mcg Oral Q7 Days     gabapentin  600 mg Oral QPM     loperamide  4 mg Oral 4x daily     metoprolol succinate  12.5 mg Oral QPM     sodium chloride (PF)  3 mL Intracatheter Q8H       Data     Recent Labs  Lab 09/08/18  0839 09/07/18  0835 09/06/18  1328 09/06/18  0602  09/05/18  1628   WBC  --  12.7*  --  18.4*  --  19.9*   HGB  --  11.7*  --  11.9*  --  14.4   MCV  --  95  --  94  --  92   PLT  --  262  --  272  --  366    143  --  147*  --  138   POTASSIUM 3.9 3.4 3.4 3.3*  < > 2.3*   CHLORIDE 115* 113*  --  121*  --  104   CO2 18* 20  --  20  --  23   BUN 13 13  --  27  --  38*   CR 0.85 1.06  --  1.20  --  1.66*   ANIONGAP 8 10  --  6  --  11   ULISSES 7.2* 7.0*  --  7.2*  --  8.5   * 77  --  87  --  93   ALBUMIN 2.4* 2.4*  --  2.5*  --  3.2*   PROTTOTAL 6.4* 6.3*  --  6.7*  --  8.5   BILITOTAL 1.5*  1.3  --  1.2  --  1.2   ALKPHOS 283* 312*  --  323*  --  313*   * 131*  --  100*  --  97*   * 188*  --  146*  --  118*   LIPASE  --   --   --   --   --  294   TROPI  --   --   --   --   --  0.027   < > = values in this interval not displayed.    Imaging:  No results found for this or any previous visit (from the past 24 hour(s)).

## 2018-09-08 NOTE — PROGRESS NOTES
"[facility]  Gastroenterology Progress note      Assessment        Diarrhea:  Not clearly improved.  Will continue to observe this weekend.  May be worth colonoscopy with biopsies next week if no better by then.  Dysphagia:  Swallow study suggests both oropharyngeal problems as well as ? Obstruction in distal esophagus:  Will schedule EGD  Abnormal LFT's:  CT and ultrasound unrevealing.  None of his meds are hepatotoxic.  Statins rarely cause LFT's more than 3X normal.      Plan    EGD today  Will order hepatitis serologies, autoimmune markers.  Follow for now.      Interval History     5 watery stools last night.  Some aspiration as well as pooling of barium in hypopharynx on swallow study.       Physical Exam    BP 98/61 (BP Location: Left arm)  Temp 97.6  F (36.4  C) (Oral)  Resp 18  Ht 1.727 m (5' 8\")  Wt 81.4 kg (179 lb 6.4 oz)  SpO2 96%  BMI 27.28 kg/m2  Temp (24hrs), Av.9  F (36.6  C), Min:97.4  F (36.3  C), Max:98.8  F (37.1  C)    Patient Vitals for the past 72 hrs:   Weight   18 0630 81.4 kg (179 lb 6.4 oz)   18 0644 80.9 kg (178 lb 5.6 oz)   18 1535 78 kg (172 lb)       Intake/Output Summary (Last 24 hours) at 18 0854  Last data filed at 18 1923   Gross per 24 hour   Intake              560 ml   Output                0 ml   Net              560 ml         Constitutional: NAD, comfortable  Cardiovascular: RRR, normal S1, S2  1/6 PRAFUL heard at LSB   Respiratory: CTAB  Abdomen: soft, non-tender, nondistended, BS + no hsm      Additional Comments     ROS, FH, SH: See initial GI consult for details.    Lab Data     Recent Labs   Lab Test  18   0835  18   0602  18   1628   17   0950   16   1733   09/04/15   1040   WBC  12.7*  18.4*  19.9*   < >  10.9   < >  14.9*   < >  9.4   HGB  11.7*  11.9*  14.4   < >  13.0*   < >  11.8*   < >  12.9*   MCV  95  94  92   < >  93   < >  94   < >  99   PLT  262  272  366   < >  302   < " >  328   < >  350   INR   --    --    --    --   1.02   --   1.17*   --   1.02    < > = values in this interval not displayed.     Recent Labs   Lab Test  09/07/18   0835  09/06/18   1328  09/06/18   0602   09/05/18   1628   NA  143   --   147*   --   138   POTASSIUM  3.4  3.4  3.3*   < >  2.3*   CHLORIDE  113*   --   121*   --   104   CO2  20   --   20   --   23   BUN  13   --   27   --   38*   CR  1.06   --   1.20   --   1.66*   ANIONGAP  10   --   6   --   11   ULISSES  7.0*   --   7.2*   --   8.5    < > = values in this interval not displayed.     Recent Labs   Lab Test  09/07/18   0835  09/06/18   0602  09/05/18   1905  09/05/18   1628   ALBUMIN  2.4*  2.5*   --   3.2*   BILITOTAL  1.3  1.2   --   1.2   ALT  131*  100*   --   97*   AST  188*  146*   --   118*   ALKPHOS  312*  323*   --   313*   PROTEIN   --    --   10*   --    LIPASE   --    --    --   294               S. Gino Marie MD  [facility]  Office: [phone] call if needed after 5PM or on weekends  Cell: [phone], not available after 5PM at this number

## 2018-09-09 ENCOUNTER — APPOINTMENT (OUTPATIENT)
Dept: SPEECH THERAPY | Facility: CLINIC | Age: 73
DRG: 392 | End: 2018-09-09
Payer: MEDICARE

## 2018-09-09 ENCOUNTER — APPOINTMENT (OUTPATIENT)
Dept: PHYSICAL THERAPY | Facility: CLINIC | Age: 73
DRG: 392 | End: 2018-09-09
Payer: MEDICARE

## 2018-09-09 LAB
ALBUMIN SERPL-MCNC: 2.4 G/DL (ref 3.4–5)
ALP SERPL-CCNC: 448 U/L (ref 40–150)
ALT SERPL W P-5'-P-CCNC: 194 U/L (ref 0–70)
ANION GAP SERPL CALCULATED.3IONS-SCNC: 8 MMOL/L (ref 3–14)
AST SERPL W P-5'-P-CCNC: 240 U/L (ref 0–45)
BILIRUB DIRECT SERPL-MCNC: 0.6 MG/DL (ref 0–0.2)
BILIRUB SERPL-MCNC: 1.3 MG/DL (ref 0.2–1.3)
BUN SERPL-MCNC: 9 MG/DL (ref 7–30)
CALCIUM SERPL-MCNC: 7.2 MG/DL (ref 8.5–10.1)
CHLORIDE SERPL-SCNC: 115 MMOL/L (ref 94–109)
CO2 SERPL-SCNC: 19 MMOL/L (ref 20–32)
CREAT SERPL-MCNC: 0.79 MG/DL (ref 0.66–1.25)
ERYTHROCYTE [DISTWIDTH] IN BLOOD BY AUTOMATED COUNT: 14.8 % (ref 10–15)
GFR SERPL CREATININE-BSD FRML MDRD: ABNORMAL ML/MIN/1.7M2
GLUCOSE SERPL-MCNC: 82 MG/DL (ref 70–99)
HCT VFR BLD AUTO: 32.5 % (ref 40–53)
HGB BLD-MCNC: 11 G/DL (ref 13.3–17.7)
MCH RBC QN AUTO: 32.4 PG (ref 26.5–33)
MCHC RBC AUTO-ENTMCNC: 33.8 G/DL (ref 31.5–36.5)
MCV RBC AUTO: 96 FL (ref 78–100)
PLATELET # BLD AUTO: 263 10E9/L (ref 150–450)
POTASSIUM SERPL-SCNC: 4 MMOL/L (ref 3.4–5.3)
POTASSIUM SERPL-SCNC: 4.1 MMOL/L (ref 3.4–5.3)
PROT SERPL-MCNC: 6.4 G/DL (ref 6.8–8.8)
RBC # BLD AUTO: 3.4 10E12/L (ref 4.4–5.9)
SODIUM SERPL-SCNC: 142 MMOL/L (ref 133–144)
WBC # BLD AUTO: 11.6 10E9/L (ref 4–11)

## 2018-09-09 PROCEDURE — 00000402 ZZHCL STATISTIC TOTAL PROTEIN: Performed by: INTERNAL MEDICINE

## 2018-09-09 PROCEDURE — 40000193 ZZH STATISTIC PT WARD VISIT

## 2018-09-09 PROCEDURE — 84132 ASSAY OF SERUM POTASSIUM: CPT | Performed by: INTERNAL MEDICINE

## 2018-09-09 PROCEDURE — 25000132 ZZH RX MED GY IP 250 OP 250 PS 637: Mod: GY | Performed by: INTERNAL MEDICINE

## 2018-09-09 PROCEDURE — 40000257 ZZH STATISTIC CONSULT NO CHARGE VASC ACCESS

## 2018-09-09 PROCEDURE — 86256 FLUORESCENT ANTIBODY TITER: CPT | Performed by: INTERNAL MEDICINE

## 2018-09-09 PROCEDURE — 25000132 ZZH RX MED GY IP 250 OP 250 PS 637: Mod: GY | Performed by: PHYSICIAN ASSISTANT

## 2018-09-09 PROCEDURE — 83516 IMMUNOASSAY NONANTIBODY: CPT | Performed by: INTERNAL MEDICINE

## 2018-09-09 PROCEDURE — 86038 ANTINUCLEAR ANTIBODIES: CPT | Performed by: INTERNAL MEDICINE

## 2018-09-09 PROCEDURE — 40000141 ZZH STATISTIC PERIPHERAL IV START W/O US GUIDANCE

## 2018-09-09 PROCEDURE — 40000225 ZZH STATISTIC SLP WARD VISIT: Performed by: SPEECH-LANGUAGE PATHOLOGIST

## 2018-09-09 PROCEDURE — 80076 HEPATIC FUNCTION PANEL: CPT | Performed by: INTERNAL MEDICINE

## 2018-09-09 PROCEDURE — 80048 BASIC METABOLIC PNL TOTAL CA: CPT | Performed by: INTERNAL MEDICINE

## 2018-09-09 PROCEDURE — A9270 NON-COVERED ITEM OR SERVICE: HCPCS | Mod: GY | Performed by: HOSPITALIST

## 2018-09-09 PROCEDURE — A9270 NON-COVERED ITEM OR SERVICE: HCPCS | Mod: GY | Performed by: PHYSICIAN ASSISTANT

## 2018-09-09 PROCEDURE — 12000000 ZZH R&B MED SURG/OB

## 2018-09-09 PROCEDURE — 86709 HEPATITIS A IGM ANTIBODY: CPT | Performed by: INTERNAL MEDICINE

## 2018-09-09 PROCEDURE — 25000132 ZZH RX MED GY IP 250 OP 250 PS 637: Mod: GY | Performed by: HOSPITALIST

## 2018-09-09 PROCEDURE — 99232 SBSQ HOSP IP/OBS MODERATE 35: CPT | Performed by: INTERNAL MEDICINE

## 2018-09-09 PROCEDURE — A9270 NON-COVERED ITEM OR SERVICE: HCPCS | Mod: GY | Performed by: INTERNAL MEDICINE

## 2018-09-09 PROCEDURE — 97116 GAIT TRAINING THERAPY: CPT | Mod: GP

## 2018-09-09 PROCEDURE — G0499 HEPB SCREEN HIGH RISK INDIV: HCPCS | Performed by: INTERNAL MEDICINE

## 2018-09-09 PROCEDURE — 36415 COLL VENOUS BLD VENIPUNCTURE: CPT | Performed by: INTERNAL MEDICINE

## 2018-09-09 PROCEDURE — 99207 ZZC CDG-MDM COMPONENT: MEETS MODERATE - UP CODED: CPT | Performed by: INTERNAL MEDICINE

## 2018-09-09 PROCEDURE — 25000128 H RX IP 250 OP 636: Performed by: HOSPITALIST

## 2018-09-09 PROCEDURE — 85027 COMPLETE CBC AUTOMATED: CPT | Performed by: INTERNAL MEDICINE

## 2018-09-09 PROCEDURE — 86803 HEPATITIS C AB TEST: CPT | Performed by: INTERNAL MEDICINE

## 2018-09-09 PROCEDURE — 92526 ORAL FUNCTION THERAPY: CPT | Mod: GN | Performed by: SPEECH-LANGUAGE PATHOLOGIST

## 2018-09-09 PROCEDURE — 84165 PROTEIN E-PHORESIS SERUM: CPT | Performed by: INTERNAL MEDICINE

## 2018-09-09 RX ADMIN — Medication 12.5 MG: at 19:55

## 2018-09-09 RX ADMIN — LOPERAMIDE HYDROCHLORIDE 4 MG: 2 CAPSULE ORAL at 19:53

## 2018-09-09 RX ADMIN — APIXABAN 5 MG: 5 TABLET, FILM COATED ORAL at 19:57

## 2018-09-09 RX ADMIN — LOPERAMIDE HYDROCHLORIDE 4 MG: 2 CAPSULE ORAL at 07:02

## 2018-09-09 RX ADMIN — GABAPENTIN 600 MG: 300 CAPSULE ORAL at 19:55

## 2018-09-09 RX ADMIN — APIXABAN 5 MG: 5 TABLET, FILM COATED ORAL at 08:10

## 2018-09-09 RX ADMIN — SODIUM CHLORIDE AND POTASSIUM CHLORIDE: 4.5; 1.49 INJECTION, SOLUTION INTRAVENOUS at 23:08

## 2018-09-09 RX ADMIN — ASPIRIN 81 MG 81 MG: 81 TABLET ORAL at 19:54

## 2018-09-09 RX ADMIN — CYANOCOBALAMIN TAB 1000 MCG 2000 MCG: 1000 TAB at 08:10

## 2018-09-09 RX ADMIN — LOPERAMIDE HYDROCHLORIDE 4 MG: 2 CAPSULE ORAL at 10:00

## 2018-09-09 RX ADMIN — BUDESONIDE 9 MG: 9 TABLET, EXTENDED RELEASE ORAL at 08:10

## 2018-09-09 RX ADMIN — SODIUM CHLORIDE AND POTASSIUM CHLORIDE: 4.5; 1.49 INJECTION, SOLUTION INTRAVENOUS at 10:00

## 2018-09-09 RX ADMIN — LOPERAMIDE HYDROCHLORIDE 4 MG: 2 CAPSULE ORAL at 14:22

## 2018-09-09 ASSESSMENT — ACTIVITIES OF DAILY LIVING (ADL)
ADLS_ACUITY_SCORE: 13
ADLS_ACUITY_SCORE: 13
ADLS_ACUITY_SCORE: 14
ADLS_ACUITY_SCORE: 13
ADLS_ACUITY_SCORE: 14
ADLS_ACUITY_SCORE: 13

## 2018-09-09 NOTE — PLAN OF CARE
Problem: Patient Care Overview  Goal: Plan of Care/Patient Progress Review  Outcome: Improving  Patient alert/orient X4, up with sba/cane/belt.  Lungs clear, on RA.  Had 1 stool today.  Tolerating diet, Getting IVF at 100cc/hr.  Vss, denied any pain. Ambulated in hallway X1.

## 2018-09-09 NOTE — PLAN OF CARE
Problem: Patient Care Overview  Goal: Plan of Care/Patient Progress Review  Outcome: Improving  A/O x4. SBA. VSS on ra. DD2 thin. Abdominal cramping treated x1 w simethicone, relief. Iv fluids infusing. Nursing will continue to monitor

## 2018-09-09 NOTE — PLAN OF CARE
Problem: Patient Care Overview  Goal: Plan of Care/Patient Progress Review  Discharge Planner SLP   Patient plan for discharge: Patient did not state.   Current status:  Patient seen for swallow treatment bedside. Did not fully recall how to perform a chin tuck, with instruction and minimal cues able to complete with 2 hard swallows. Took pills in apple sauce and needed a liquid rinse to fully clear. Tolerated apple sauce without difficulty. Recommend: 1. Continue on the DDL 2 with thin liquids. 2. Up in chair for all meals, CHIN TUCK with all liquids x2 and alternate liquids/solids. 3. SLP will f/u at a meal for tolerance and swallow strategies.   Barriers to return to prior living situation: Deconditioning  Recommendations for discharge: TCU  Rationale for recommendations: Continue ST at next level of care for diet advancement and swallow strategies. Not at his baseline.        Entered by: Do Issa 09/09/2018 3:09 PM

## 2018-09-09 NOTE — PLAN OF CARE
Problem: Patient Care Overview  Goal: Plan of Care/Patient Progress Review  Outcome: Improving  Alert and oriented. Up with SBA, gait belt and cane to the bathroom.  Low BP, metoprolol held, other VSS on room air. No complaints of pain. Skin pale but BLE slightly jaundice. IV at 100/hr. Tolerating DD2 with thin liquids.

## 2018-09-09 NOTE — PROGRESS NOTES
"[facility]  Gastroenterology Progress note      Assessment       No obvious change in swallowing post egd.  Finished his breakfast (omelet) without difficulty.  Some coughing when he lies flat.  Doubt significant esophageal component to his symptoms      Plan     Diet changes per speech pathology   workup for abnormal LFT\"s pending.     Interval History     see above.       Physical Exam    /68 (BP Location: Left arm)  Temp 97.5  F (36.4  C) (Oral)  Resp 16  Ht 1.727 m (5' 8\")  Wt 81.4 kg (179 lb 7.3 oz)  SpO2 96%  BMI 27.29 kg/m2  Temp (24hrs), Av.4  F (36.3  C), Min:97.3  F (36.3  C), Max:97.6  F (36.4  C)    Patient Vitals for the past 72 hrs:   Weight   18 0500 81.4 kg (179 lb 7.3 oz)   18 0630 81.4 kg (179 lb 6.4 oz)       Intake/Output Summary (Last 24 hours) at 18 0954  Last data filed at 18 0702   Gross per 24 hour   Intake             5897 ml   Output                0 ml   Net             5897 ml         Constitutional: NAD, comfortable  Cardiovascular: RRR, normal S1, S2   Respiratory: CTAB  Abdomen: soft, non-tender, nondistended,  bs+      Additional Comments     ROS, FH, SH: See initial GI consult for details.    Lab Data     Recent Labs   Lab Test  18   0835  18   0602  18   1628   17   0950   16   1733   09/04/15   1040   WBC  12.7*  18.4*  19.9*   < >  10.9   < >  14.9*   < >  9.4   HGB  11.7*  11.9*  14.4   < >  13.0*   < >  11.8*   < >  12.9*   MCV  95  94  92   < >  93   < >  94   < >  99   PLT  262  272  366   < >  302   < >  328   < >  350   INR   --    --    --    --   1.02   --   1.17*   --   1.02    < > = values in this interval not displayed.     Recent Labs   Lab Test  18   0839  18   0835  18   1328  18   0602   NA  141  143   --   147*   POTASSIUM  3.9  3.4  3.4  3.3*   CHLORIDE  115*  113*   --   121*   CO2  18*  20   --   20   BUN  13  13   --   27   CR  0.85  1.06   --  "  1.20   ANIONGAP  8  10   --   6   ULISSES  7.2*  7.0*   --   7.2*     Recent Labs   Lab Test  09/08/18   0839  09/07/18   0835  09/06/18   0602  09/05/18   1905  09/05/18   1628   ALBUMIN  2.4*  2.4*  2.5*   --   3.2*   BILITOTAL  1.5*  1.3  1.2   --   1.2   ALT  163*  131*  100*   --   97*   AST  213*  188*  146*   --   118*   ALKPHOS  283*  312*  323*   --   313*   PROTEIN   --    --    --   10*   --    LIPASE   --    --    --    --   294               S. Gino Marie MD  [facility]  Office: [phone] call if needed after 5PM or on weekends  Cell: [phone], not available after 5PM at this number

## 2018-09-09 NOTE — CONSULTS
Care Transition Initial Assessment -   Reason For Consult: discharge planning  Met with: Patient    Active Problems:    Hypokalemia       DATA  Lives With: spouse  Living Arrangements: house     Who is your support system?: Wife, son  Identified issues/concerns regarding health management: SW consult noted for DC planning. Patient is a 73 year old male anticipated to be ready for DC in 1 day. PT recommends a TCU stay. At baseline patient lives with his spouse in their home with steps. He uses a cane or walker.   Met with patient and reviewed Medicare guidelines for coverage of a TCU stay. Patient has been at [facility] in the past (8/16) and he would prefer placement there again. If other options are needed, his back up choices would be [facility] or [facility]. He prefers not to pay private room fees. He believes his spouse or son would be able to provide transport. Updated spouse, Mariajose.    A referral was sent via [facility] to [facility].     Quality Of Family Relationships: supportive, involved     ASSESSMENT  Cognitive Status: A&O  Concerns to be addressed: SW will follow for DC planning. No other concerns evident.   PLAN  Financial costs for the patient includes: None anticipated  Patient given options and choices for discharge: Yes  Patient/family is agreeable to the plan? Yes  Patient Goals and Preferences: TCU  Patient anticipates discharging to: TCU

## 2018-09-09 NOTE — PLAN OF CARE
Problem: Patient Care Overview  Goal: Plan of Care/Patient Progress Review  PT:  Discharge Planner PT   Patient plan for discharge: Go to rehab  Current status: Pt required SBA for bed mobility and transfers with SEC, CGA for gait of 150 ft with SEC with dec balance noted and multiple missteps causing small LOBs. Pt stated he felt off balance today and fatigued.  Barriers to return to prior living situation: Falls risk, needs assist with mobility, stairs in the home  Recommendations for discharge: TCU  Rationale for recommendations: Pt would benefit from continued PT to improve strength, balance, mobility to increase independence, reduce falls risk and increase safety before returning home.       Entered by: Pham Cervantes 09/09/2018 3:13 PM

## 2018-09-09 NOTE — PLAN OF CARE
Problem: Patient Care Overview  Goal: Plan of Care/Patient Progress Review  PT: Attempted to see patient for PT session. Pt stated he just got out of the the shower and is too fatigued for PT right now. Agreeable for PT later on if able.

## 2018-09-09 NOTE — PROGRESS NOTES
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     Diarrhea  - h/o chronic diarrhea  - last colonoscopy in 8/2013 was incomplete due to a redundant colon.  Random biopsies were obtained for diarrhea and he was found to have collagenous colitis; he had  a virtual colonoscopy after his last colonoscopy in 9/2013 which was normal except for scattered sigmoid diverticulosis- as per notes  - no associated fever, no abd pain, no blood in stool  - WBCs elevated up to 18.4, but gradually improving   - CT abdomen and US abdomen- no acute pathology  - C diff negative; stool sample for enteric bacteria and viruses- negative  -Continue with Budesonide daily for 8 weeks and scheduled Imodium QID  - diet as tolerated  - iv fluids 1/2NS with KCl at 100cc/h, stop IVF if diarrhea continues to improve.  However if diarrhea recurs he may need colonoscopy.  GI following and consult appreciated     Dysphagia to solid food   Voice change  - reported difficulty swallowing solids and pills and change of voice for the last 2 weeks  - has h/o vocal cord cancer and is believed to be in remission  - SLP- rec DD2 with thin liquids, aspiration precaution, status post video swallow earlier today, does show some silent aspiration with thin liquid, see below for details  -Will continue to need outpatient speech evaluation and treatment, s/p  EGD 9/8/18-.Minimal schatzki's ring :  Biopsied for disruption  Tertiary esophageal contractions ?esophageal spasm.Exam otherwise negative.      Hypokalemia   - likely due to diarrhea  - K replacement protocol       Acute kidney injury   - Probably prerenal due to diarrhea  -Currently on IV fluids and renal function now improved.  Continue to  monitor     Hypernatremia  - Na 147, improved after IV fluids changed to half normal saline  -Monitor     Elevated liver transaminases    - Total bilirubin is 1.2, ALT 97--100, --146, alkaline phosphatase 313 and lipase 294  - Upper quadrant ultrasound negative, viral serology pending, GI following  -Continue to hold PTA statin for now and avoid hepatotoxic drugs  -Continue to monitor     Leukocytosis  - cause?- stress?, dehydration?others, improved today, patient has been afebrile  - UA negative, CXR- no acute infiltrates, CT abd- no acute pathology  - c diff and stool cultures negative  - continue to monitor     Lethargy/weakness  - as per son- pt is more lethargic in the last few weeks- possible related to dehydration/diarrhea and electrolyte imbalance  -Head CT negative, PT OT recommending TCU,      Chronic atrial fibrillation  Hypertension  - rate controlled on PTA Toprol XL 12.5 mg po qdaily  - continue PTA apixaban      Hypercholesterolemia   - hold PTA Lipitor for now given elevated LFTs      MGUS which is being monitored by his oncologist    Pain assessment :  Current Pain Score 9/9/2018   Patient currently in pain? denies   Pain score (0-10) -   Pain location -   Pain descriptors -   Efrem s pain level was assessed and he currently denies pain.      DVT Prophylaxis: Eliquis  Code Status: Full code    Disposition: Expected discharge in 1-2 days once diarrhea improves, renal function/electrolytes remained stable off IV fluids and clearance by GI and placement found.    Emeli Reyes MD  Text page (7am - 6pm)    Interval History     Patient reports his diarrhea has improved today and last night he just had one bowel movement.  Has some occasional gassy distention but feels better today.  No new nursing concerns  -Data reviewed today: I reviewed all new labs and imaging results over the last 24 hours. I personally reviewed the Barium swallow evaluation image(s) showing See below.    Physical Exam    Temp: 97.5  F (36.4  C) Temp src: Oral BP: 104/68   Heart Rate: 58 Resp: 16 SpO2: 96 % O2 Device: None (Room air)    Vitals:    09/06/18 0644 09/08/18 0630 09/09/18 0500   Weight: 80.9 kg (178 lb 5.6 oz) 81.4 kg (179 lb 6.4 oz) 81.4 kg (179 lb 7.3 oz)     Vital Signs with Ranges  Temp:  [97.3  F (36.3  C)-97.6  F (36.4  C)] 97.5  F (36.4  C)  Heart Rate:  [58-70] 58  Resp:  [15-18] 16  BP: ()/(48-68) 104/68  SpO2:  [93 %-99 %] 96 %  I/O last 3 completed shifts:  In: 6303 [P.O.:360; I.V.:5943]  Out: -     Exam:  Constitutional: Awake, alert and no distress. Appears comfortable  Head: Normocephalic. No masses, lesions, tenderness or abnormalities  ENT: no neck nodes or sinus tenderness  Cardiovascular: Irregular but rate controlled, 2+ murmurs, no rub or gallop no JVD  Respiratory: Normal WOB, no wheezes or crackles   Gastrointestinal: Abdomen soft, non-tender. BS normal. No masses, organomegaly  : Deferred  Extremities: No edema , no clubbing /cyanosis   Skin: Warm and dry, no rash        Medications     IV infusion builder WITH additives 100 mL/hr at 09/09/18 1000     - MEDICATION INSTRUCTIONS -       - MEDICATION INSTRUCTIONS -         apixaban ANTICOAGULANT  5 mg Oral BID     aspirin chewable tablet 81 mg  81 mg Oral QPM     budesonide  9 mg Oral Daily     cyanocobalamin  2,000 mcg Oral Q7 Days     gabapentin  600 mg Oral QPM     loperamide  4 mg Oral 4x daily     metoprolol succinate  12.5 mg Oral QPM     sodium chloride (PF)  3 mL Intracatheter Q8H       Data     Recent Labs  Lab 09/09/18  0601 09/08/18  0839 09/07/18  0835  09/06/18  0602  09/05/18  1628   WBC 11.6*  --  12.7*  --  18.4*  --  19.9*   HGB 11.0*  --  11.7*  --  11.9*  --  14.4   MCV 96  --  95  --  94  --  92     --  262  --  272  --  366    141 143  --  147*  --  138   POTASSIUM 4.0 3.9 3.4  < > 3.3*  < > 2.3*   CHLORIDE 115* 115* 113*  --  121*  --  104   CO2 19* 18* 20  --  20  --  23   BUN 9 13 13  --  27  --  38*   CR  0.79 0.85 1.06  --  1.20  --  1.66*   ANIONGAP 8 8 10  --  6  --  11   ULISSES 7.2* 7.2* 7.0*  --  7.2*  --  8.5   GLC 82 102* 77  --  87  --  93   ALBUMIN 2.4* 2.4* 2.4*  --  2.5*  --  3.2*   PROTTOTAL 6.4* 6.4* 6.3*  --  6.7*  --  8.5   BILITOTAL 1.3 1.5* 1.3  --  1.2  --  1.2   ALKPHOS 448* 283* 312*  --  323*  --  313*   * 163* 131*  --  100*  --  97*   * 213* 188*  --  146*  --  118*   LIPASE  --   --   --   --   --   --  294   TROPI  --   --   --   --   --   --  0.027   < > = values in this interval not displayed.    Imaging:  No results found for this or any previous visit (from the past 24 hour(s)).

## 2018-09-10 ENCOUNTER — APPOINTMENT (OUTPATIENT)
Dept: SPEECH THERAPY | Facility: CLINIC | Age: 73
DRG: 392 | End: 2018-09-10
Payer: MEDICARE

## 2018-09-10 VITALS
HEIGHT: 68 IN | BODY MASS INDEX: 28.22 KG/M2 | HEART RATE: 81 BPM | RESPIRATION RATE: 16 BRPM | WEIGHT: 186.2 LBS | OXYGEN SATURATION: 90 % | SYSTOLIC BLOOD PRESSURE: 133 MMHG | DIASTOLIC BLOOD PRESSURE: 90 MMHG | TEMPERATURE: 97.4 F

## 2018-09-10 VITALS
TEMPERATURE: 98.3 F | HEART RATE: 54 BPM | OXYGEN SATURATION: 97 % | DIASTOLIC BLOOD PRESSURE: 71 MMHG | BODY MASS INDEX: 29.3 KG/M2 | RESPIRATION RATE: 16 BRPM | WEIGHT: 192.7 LBS | SYSTOLIC BLOOD PRESSURE: 116 MMHG

## 2018-09-10 LAB
ALBUMIN SERPL ELPH-MCNC: 2.7 G/DL (ref 3.7–5.1)
ALBUMIN SERPL-MCNC: 2.5 G/DL (ref 3.4–5)
ALP SERPL-CCNC: 424 U/L (ref 40–150)
ALPHA1 GLOB SERPL ELPH-MCNC: 0.4 G/DL (ref 0.2–0.4)
ALPHA2 GLOB SERPL ELPH-MCNC: 0.8 G/DL (ref 0.5–0.9)
ALT SERPL W P-5'-P-CCNC: 183 U/L (ref 0–70)
ANION GAP SERPL CALCULATED.3IONS-SCNC: 8 MMOL/L (ref 3–14)
AST SERPL W P-5'-P-CCNC: 159 U/L (ref 0–45)
B-GLOBULIN SERPL ELPH-MCNC: 0.6 G/DL (ref 0.6–1)
BILIRUB DIRECT SERPL-MCNC: 0.4 MG/DL (ref 0–0.2)
BILIRUB SERPL-MCNC: 0.9 MG/DL (ref 0.2–1.3)
BUN SERPL-MCNC: 7 MG/DL (ref 7–30)
CALCIUM SERPL-MCNC: 7.4 MG/DL (ref 8.5–10.1)
CHLORIDE SERPL-SCNC: 114 MMOL/L (ref 94–109)
CO2 SERPL-SCNC: 19 MMOL/L (ref 20–32)
CREAT SERPL-MCNC: 0.69 MG/DL (ref 0.66–1.25)
ERYTHROCYTE [DISTWIDTH] IN BLOOD BY AUTOMATED COUNT: 14.9 % (ref 10–15)
GAMMA GLOB SERPL ELPH-MCNC: 1.6 G/DL (ref 0.7–1.6)
GFR SERPL CREATININE-BSD FRML MDRD: >90 ML/MIN/1.7M2
GLUCOSE SERPL-MCNC: 95 MG/DL (ref 70–99)
HAV IGM SERPL QL IA: NONREACTIVE
HAV IGM SERPL QL IA: NONREACTIVE
HBV SURFACE AG SERPL QL IA: NONREACTIVE
HBV SURFACE AG SERPL QL IA: NONREACTIVE
HCT VFR BLD AUTO: 33.2 % (ref 40–53)
HCV AB SERPL QL IA: NONREACTIVE
HCV AB SERPL QL IA: NONREACTIVE
HGB BLD-MCNC: 11.2 G/DL (ref 13.3–17.7)
M PROTEIN SERPL ELPH-MCNC: 0.5 G/DL
MAGNESIUM SERPL-MCNC: 1.8 MG/DL (ref 1.6–2.3)
MCH RBC QN AUTO: 32.2 PG (ref 26.5–33)
MCHC RBC AUTO-ENTMCNC: 33.7 G/DL (ref 31.5–36.5)
MCV RBC AUTO: 95 FL (ref 78–100)
MITOCHONDRIA M2 IGG SER-ACNC: 1 U/ML
PLATELET # BLD AUTO: 294 10E9/L (ref 150–450)
POTASSIUM SERPL-SCNC: 4.3 MMOL/L (ref 3.4–5.3)
PROT PATTERN SERPL ELPH-IMP: ABNORMAL
PROT SERPL-MCNC: 6.9 G/DL (ref 6.8–8.8)
RBC # BLD AUTO: 3.48 10E12/L (ref 4.4–5.9)
SODIUM SERPL-SCNC: 141 MMOL/L (ref 133–144)
WBC # BLD AUTO: 11.2 10E9/L (ref 4–11)

## 2018-09-10 PROCEDURE — 82248 BILIRUBIN DIRECT: CPT | Performed by: INTERNAL MEDICINE

## 2018-09-10 PROCEDURE — A9270 NON-COVERED ITEM OR SERVICE: HCPCS | Mod: GY | Performed by: PHYSICIAN ASSISTANT

## 2018-09-10 PROCEDURE — 36415 COLL VENOUS BLD VENIPUNCTURE: CPT | Performed by: INTERNAL MEDICINE

## 2018-09-10 PROCEDURE — 25000132 ZZH RX MED GY IP 250 OP 250 PS 637: Mod: GY | Performed by: INTERNAL MEDICINE

## 2018-09-10 PROCEDURE — 83735 ASSAY OF MAGNESIUM: CPT | Performed by: INTERNAL MEDICINE

## 2018-09-10 PROCEDURE — 40000225 ZZH STATISTIC SLP WARD VISIT: Performed by: SPEECH-LANGUAGE PATHOLOGIST

## 2018-09-10 PROCEDURE — 80053 COMPREHEN METABOLIC PANEL: CPT | Performed by: INTERNAL MEDICINE

## 2018-09-10 PROCEDURE — 99239 HOSP IP/OBS DSCHRG MGMT >30: CPT | Performed by: INTERNAL MEDICINE

## 2018-09-10 PROCEDURE — 25000132 ZZH RX MED GY IP 250 OP 250 PS 637: Mod: GY | Performed by: HOSPITALIST

## 2018-09-10 PROCEDURE — A9270 NON-COVERED ITEM OR SERVICE: HCPCS | Mod: GY | Performed by: HOSPITALIST

## 2018-09-10 PROCEDURE — 92526 ORAL FUNCTION THERAPY: CPT | Mod: GN | Performed by: SPEECH-LANGUAGE PATHOLOGIST

## 2018-09-10 PROCEDURE — A9270 NON-COVERED ITEM OR SERVICE: HCPCS | Mod: GY | Performed by: INTERNAL MEDICINE

## 2018-09-10 PROCEDURE — 85027 COMPLETE CBC AUTOMATED: CPT | Performed by: INTERNAL MEDICINE

## 2018-09-10 PROCEDURE — 25000132 ZZH RX MED GY IP 250 OP 250 PS 637: Mod: GY | Performed by: PHYSICIAN ASSISTANT

## 2018-09-10 PROCEDURE — 99207 ZZC CDG-CHARGE REQUIRED MANUAL ENTRY: CPT | Performed by: INTERNAL MEDICINE

## 2018-09-10 RX ORDER — LOPERAMIDE HCL 2 MG
4 CAPSULE ORAL 4 TIMES DAILY PRN
Qty: 20 CAPSULE
Start: 2018-09-10 | End: 2018-10-22

## 2018-09-10 RX ORDER — BUDESONIDE 9 MG/1
9 TABLET, FILM COATED, EXTENDED RELEASE ORAL DAILY
Qty: 30 TABLET | Refills: 1 | Status: SHIPPED | OUTPATIENT
Start: 2018-09-11 | End: 2018-10-22

## 2018-09-10 RX ORDER — SIMETHICONE 80 MG
80 TABLET,CHEWABLE ORAL EVERY 6 HOURS PRN
Qty: 180 TABLET
Start: 2018-09-10 | End: 2018-10-22

## 2018-09-10 RX ADMIN — APIXABAN 5 MG: 5 TABLET, FILM COATED ORAL at 08:22

## 2018-09-10 RX ADMIN — SIMETHICONE CHEW TAB 80 MG 80 MG: 80 TABLET ORAL at 01:08

## 2018-09-10 RX ADMIN — LOPERAMIDE HYDROCHLORIDE 4 MG: 2 CAPSULE ORAL at 12:02

## 2018-09-10 RX ADMIN — LOPERAMIDE HYDROCHLORIDE 4 MG: 2 CAPSULE ORAL at 06:42

## 2018-09-10 RX ADMIN — BUDESONIDE 9 MG: 9 TABLET, EXTENDED RELEASE ORAL at 08:22

## 2018-09-10 ASSESSMENT — ACTIVITIES OF DAILY LIVING (ADL)
ADLS_ACUITY_SCORE: 13

## 2018-09-10 NOTE — PROGRESS NOTES
"GASTROENTEROLOGY PROGRESS NOTE     SUBJECTIVE: The patient states diarrhea has improved - reports having 0-1 bowel movement per day over the weekend. No complaints of dysphagia. Seems to have some abdominal discomfort in the evening hours - none currently.     OBJECTIVE:   /90 (BP Location: Left arm)  Pulse 81  Temp 97.4  F (36.3  C) (Axillary)  Resp 16  Ht 1.727 m (5' 8\")  Wt 84.5 kg (186 lb 3.2 oz)  SpO2 90%  BMI 28.31 kg/m2   Temp (24hrs), Av.4  F (36.3  C), Min:97.4  F (36.3  C), Max:97.4  F (36.3  C)     Patient Vitals for the past 72 hrs:   Weight   09/10/18 0634 84.5 kg (186 lb 3.2 oz)   18 0500 81.4 kg (179 lb 7.3 oz)   18 0630 81.4 kg (179 lb 6.4 oz)        Intake/Output Summary (Last 24 hours) at 09/10/18 0934  Last data filed at 09/10/18 0800   Gross per 24 hour   Intake             2684 ml   Output                0 ml   Net             2684 ml      PHYSICAL EXAM   Constitutional: in bed, no acute distress  Cardiovascular: Regular rate and rhythm. No murmurs rubs or gallops  Respiratory: Clear to auscultation bilaterally  Abdomen: Soft, non-tender, non-distended, normally active bowel sounds. No guarding or rebound tenderness.    Additional Comments:   ROS, FH, SH: See initial GI consult for details.     I have reviewed the patient's new clinical lab results:   Recent Labs   Lab Test  09/10/18   0750  18   0601  18   0835   17   0950   16   1733   09/04/15   1040   WBC  11.2*  11.6*  12.7*   < >  10.9   < >  14.9*   < >  9.4   HGB  11.2*  11.0*  11.7*   < >  13.0*   < >  11.8*   < >  12.9*   MCV  95  96  95   < >  93   < >  94   < >  99   PLT  294  263  262   < >  302   < >  328   < >  350   INR   --    --    --    --   1.02   --   1.17*   --   1.02    < > = values in this interval not displayed.      Recent Labs   Lab Test  09/10/18   0750  18   1745  18   0601  18   0839   NA  141   --   142  141   POTASSIUM  4.3  4.1  4.0  3.9 "   CHLORIDE  114*   --   115*  115*   CO2  19*   --   19*  18*   BUN  7   --   9  13   CR  0.69   --   0.79  0.85   ANIONGAP  8   --   8  8   ULISSES  7.4*   --   7.2*  7.2*      Recent Labs   Lab Test  09/10/18   0750  09/09/18   0601  09/08/18   0839   09/05/18   1905  09/05/18   1628   ALBUMIN  2.5*  2.4*  2.4*   < >   --   3.2*   BILITOTAL  0.9  1.3  1.5*   < >   --   1.2   ALT  183*  194*  163*   < >   --   97*   AST  159*  240*  213*   < >   --   118*   ALKPHOS  424*  448*  283*   < >   --   313*   PROTEIN   --    --    --    --   10*   --    LIPASE   --    --    --    --    --   294    < > = values in this interval not displayed.          Assessment:  This is a pleasant 73 year old male with a history of collagenous colitis and redundant colon who we are following for diarrhea, dysphagia, and elevated liver function tests. He had symptoms consistent with reoccurrence of microscopic colitis and has had an excellent response to the initiation of budesonide.      Plan:  -Diarrhea    Enteric pathogen panel/c diff negative    History of collagenous colitis. Continue budesonide 9mg for 8 weeks   History of redundant colon and diverticulosis    Replace potassium as needed   -Elevated LFTs   Cause remains unknown - alkaline phosphatase 424, ,    Imaging studies unremarkable - US/CT   Additional liver studies are pending   Continue to hold Statin and avoid hepatoxic medications   -Dysphagia    Benign Schatzki's ring was dilated with recent EGD   Biopsies from EGD are pending   If dysphagia persists, could consider esophageal manometry in the future  -OK to discharge per GI (discussed with Dr. Enrique) follow up in [facility] in 1-2 weeks     [facility]  Cell: [phone]  Monday through Friday 5544-5020  Office: [phone]

## 2018-09-10 NOTE — DISCHARGE SUMMARY
[facility]    Discharge Summary  Hospitalist    Date of Admission:  9/5/2018  Date of Discharge:  9/10/2018  Discharging Provider: Emeli Reyes MD    Discharge Diagnoses   Acute exacerbation of chronic diarrhea  History of collagenous colitis  Hypokalemia related to above  Acute kidney injury related to above  Hypernatremia due to dehydration, resolved  Transaminitis  Leukocytosis  Dysphagia  History of vocal cord cancer  History of chronic atrial fibrillation on chronic anticoagulation  Hypertension  History of MGUS  Hypercholesterolemia  History of disseminated encephalomyelitis  Physical deconditioning    History of Present Illness   Efrem Ramos is a 73 year old male with PMH of Afib- on Eliquis, CVA, HLP, MGUS, 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  Please see admission H&P for details    Hospital Course   Efrem Ramos was admitted on 9/5/2018.  The following problems were addressed during his hospitalization:    Diarrhea  - h/o chronic diarrhea related to collagenous colitis  - last colonoscopy in 8/2013 was incomplete due to a redundant colon.  Random biopsies were obtained for diarrhea and he was found to have collagenous colitis; he had  a virtual colonoscopy after his last colonoscopy in 9/2013 which was normal except for scattered sigmoid diverticulosis- as per notes  - no associated fever, no abd pain, no blood in stool, CT abdomen and US abdomen- no acute pathology,C diff negative; stool sample for enteric bacteria and viruses- negative  -Started on budesonide daily with the plan to continue for 8 weeks and scheduled Imodium QID, diarrhea improved prior to discharge and was advised to follow-up with [facility] as an outpatient.      Dysphagia to solid food   Voice change  - reported difficulty swallowing solids and pills and change of voice for the last 2  weeks  - has h/o vocal cord cancer and is believed to be in remission  - SLP followed patient while in-house and had a video swallow evaluation which showed some silent aspiration with thin liquids.  See below for details.  Speech recommended  DD2 with thin liquids, aspiration precaution, he needs to follow-up with speech therapy outpatient  -s/p  EGD 9/8/18-.Minimal schatzki's ring :  Biopsied, pathology pending at the time of discharge.  There is also possibility of tertiary esophageal contractions ?esophageal spasm.  May need esophageal manometry as an outpatient      Hypokalemia   - likely due to diarrhea, replaced prior to discharge      Acute kidney injury   - Probably prerenal due to diarrhea, improved with hydration      Hypernatremia  - Na 147, improved with hydration      Elevated liver transaminases    -Patient did have  elevated LFTs which remained stable.  He did have intermittent right upper quadrant abdominal pain which would not persist  - Upper quadrant ultrasound negative, viral serology and workup for elevated LFTs pending at the time of discharge, needs outpatient follow-up with GI/liver specialist  -Continue to hold PTA statin at the time of discharge      Leukocytosis  - cause?- stress?, dehydration?others, improved today, patient has been afebrile  - UA negative, CXR- no acute infiltrates, CT abd- no acute pathology  - c diff and stool cultures negative  - continue to monitor intermittently      Lethargy/weakness  - as per son- pt is more lethargic in the last few weeks- possible related to dehydration/diarrhea and electrolyte imbalance and physical deconditioning  -Head CT negative, patient being discharged to TCU for rehabilitation      Chronic atrial fibrillation  Hypertension  - rate controlled on PTA Toprol XL 12.5 mg po qdaily, continued at the time of discharge  - continue PTA apixaban      Hypercholesterolemia   - hold PTA Lipitor for now given elevated LFTs      MGUS which is being  monitored by his oncologist, follow-up with his primary oncologist as previous schedule, no new acute issues while in-house  # Discharge Pain Plan:   - Patient currently has NO PAIN and is not being prescribed pain medications on discharge.    Active Problems:    Hypokalemia      Emeli Reyes MD    Significant Results and Procedures   See below    Pending Results   These results will be followed up by GI specialist/PCP  Unresulted Labs Ordered in the Past 30 Days of this Admission     Date and Time Order Name Status Description    9/10/2018 0750 Magnesium In process     9/9/2018 1001 Hepatitis C antibody In process     9/9/2018 1001 Hepatitis B surface antigen In process     9/9/2018 1001 Hepatitis A antibody IgM In process     9/8/2018 2342 Mitochondrial M2 Antibody IgG In process     9/8/2018 2342 Protein electrophoresis In process     9/8/2018 2342 F Actin EIA with reflex In process     9/8/2018 2342 Anti Nuclear Nimisha IgG by IFA with Reflex In process     9/8/2018 1157 Surgical pathology exam In process     9/8/2018 0902 Hepatitis C antibody In process     9/8/2018 0902 Hepatitis B surface antigen In process     9/8/2018 0902 Hepatitis A antibody IgM In process     9/6/2018 0944 Tissue transglutaminase nimisha IgA and IgG In process     9/5/2018 1933 Blood culture Preliminary     9/5/2018 1933 Blood culture Preliminary           Code Status   Full Code       Primary Care Physician   Danny Paige MD    Physical Exam   Temp: 97.4  F (36.3  C) Temp src: Axillary BP: 133/90 Pulse: 81 Heart Rate: 81 Resp: 16 SpO2: 90 % O2 Device: None (Room air)    Vitals:    09/08/18 0630 09/09/18 0500 09/10/18 0634   Weight: 81.4 kg (179 lb 6.4 oz) 81.4 kg (179 lb 7.3 oz) 84.5 kg (186 lb 3.2 oz)     Vital Signs with Ranges  Temp:  [97.4  F (36.3  C)] 97.4  F (36.3  C)  Pulse:  [81] 81  Heart Rate:  [64-81] 81  Resp:  [16] 16  BP: (104-133)/(67-90) 133/90  SpO2:  [90 %-95 %] 90 %  I/O last 3 completed shifts:  In: 3099  [P.O.:240; I.V.:2859]  Out: -     Exam:  Constitutional: Awake, alert and no distress. Appears comfortable  Head: Normocephalic. No masses, lesions, tenderness or abnormalities  ENT: ENT exam normal, no neck nodes or sinus tenderness  Cardiovascular: RRR.  No murmurs, no rubs or JVD  Respiratory: Normal WOB,b/l equal air entry, no wheezes or crackles   Gastrointestinal: Abdomen soft, non-tender. BS normal. No masses, organomegaly  : Deferred   extremities : No edema , no clubbing or cyanosis      Discharge Disposition   Discharged to short-term care facility  Condition at discharge: Stable    Consultations This Hospital Stay   GASTROENTEROLOGY IP CONSULT  SWALLOW EVAL SPEECH PATH AT BEDSIDE IP CONSULT  PHYSICAL THERAPY ADULT IP CONSULT  SOCIAL WORK IP CONSULT  PHYSICAL THERAPY ADULT IP CONSULT  OCCUPATIONAL THERAPY ADULT IP CONSULT  SPEECH LANGUAGE PATH ADULT IP CONSULT    Time Spent on this Encounter   Emeli GREEN, personally saw the patient today and spent greater than 30 minutes discharging this patient.    Discharge Orders     General info for SNF   Length of Stay Estimate: Short Term Care: Estimated # of Days <30  Condition at Discharge: Improving  Level of care:skilled   Rehabilitation Potential: Good  Admission H&P remains valid and up-to-date: Yes  Recent Chemotherapy: N/A  Use Nursing Home Standing Orders: Yes     Mantoux instructions   Give two-step Mantoux (PPD) Per Facility Policy Yes     Reason for your hospital stay   Diarrhea, low potassium , dysphagia, elevated liver function test     Intake and output   Every shift     Daily weights   Call Provider for weight gain of more than 2 pounds per day or 5 pounds per week.     Follow Up and recommended labs and tests   Follow up with correction physician.  The following labs/tests are recommended: CBC/CMP.  Follow-up with GI in 2-3 weeks ([facility])     Activity - Up with assistive device     Activity - Up with nursing assistance     Full Code      Physical Therapy Adult Consult   Evaluate and treat as clinically indicated.    Reason:  Deconditioning     Occupational Therapy Adult Consult   Evaluate and treat as clinically indicated.    Reason:  Deconditioning     Speech Language Path Adult Consult   Evaluate and treat as clinically indicated.    Reason:  Dysphagia     Fall precautions     Pneumatic Compression Device    Bilateral calf. Remove 30 mins BID.     Advance Diet as Tolerated   Follow this diet upon discharge: Orders Placed This Encounter     Room Service     Combination Diet Dysphagia Diet Level 2: Mechan Altered; Thin Liquids (water, ice chips, juice, milk gelatin, ice cream, etc)  ASpiration precaution       Discharge Medications   Current Discharge Medication List      START taking these medications    Details   budesonide (UCERIS) 9 MG 24 hr tablet Take 1 tablet (9 mg) by mouth daily  Qty: 30 tablet, Refills: 1    Associated Diagnoses: Collagenous colitis      loperamide (IMODIUM) 2 MG capsule Take 2 capsules (4 mg) by mouth 4 times daily as needed for diarrhea  Qty: 20 capsule    Comments: ho  Associated Diagnoses: Diarrhea, unspecified type      simethicone (MYLICON) 80 MG chewable tablet Take 1 tablet (80 mg) by mouth every 6 hours as needed for cramping  Qty: 180 tablet    Associated Diagnoses: Flatulence, eructation and gas pain         CONTINUE these medications which have NOT CHANGED    Details   apixaban ANTICOAGULANT (ELIQUIS) 5 MG tablet Take 1 tablet (5 mg) by mouth 2 times daily  Qty: 180 tablet, Refills: 2    Associated Diagnoses: Atrial fibrillation and flutter (H)      ASPIRIN PO Take 81 mg by mouth every evening       gabapentin (NEURONTIN) 300 MG capsule Take 2 capsules (600 mg) by mouth every evening  Qty: 60 capsule, Refills: 5    Associated Diagnoses: Restless legs syndrome (RLS)      metoprolol succinate (TOPROL-XL) 25 MG 24 hr tablet Take 0.5 tablets (12.5 mg) by mouth daily  Qty: 45 tablet, Refills: 2    Associated  Diagnoses: Nonrheumatic aortic valve stenosis      multivitamin (OCUVITE) TABS Take 1 tablet by mouth 2 times daily       Cyanocobalamin 2000 MCG TABS Take 2,000 mcg by mouth every 7 days On Sundays         STOP taking these medications       acetaminophen (TYLENOL) 500 MG tablet Comments:   Reason for Stopping:         atorvastatin (LIPITOR) 40 MG tablet Comments:   Reason for Stopping:             Allergies   Allergies   Allergen Reactions     Adhesive Tape Blisters     Amiodarone      Lasix [Furosemide] Other (See Comments)     Throat swelling, wheezing     Penicillins Unknown     Sulfa Drugs Difficulty breathing     Data   Most Recent 3 CBC's:  Recent Labs   Lab Test  09/10/18   0750  09/09/18   0601  09/07/18   0835   WBC  11.2*  11.6*  12.7*   HGB  11.2*  11.0*  11.7*   MCV  95  96  95   PLT  294  263  262      Most Recent 3 BMP's:  Recent Labs   Lab Test  09/10/18   0750  09/09/18   1745  09/09/18   0601  09/08/18   0839   NA  141   --   142  141   POTASSIUM  4.3  4.1  4.0  3.9   CHLORIDE  114*   --   115*  115*   CO2  19*   --   19*  18*   BUN  7   --   9  13   CR  0.69   --   0.79  0.85   ANIONGAP  8   --   8  8   ULISSES  7.4*   --   7.2*  7.2*   GLC  95   --   82  102*     Most Recent 2 LFT's:  Recent Labs   Lab Test  09/10/18   0750  09/09/18   0601   AST  159*  240*   ALT  183*  194*   ALKPHOS  424*  448*   BILITOTAL  0.9  1.3     Most Recent INR's and Anticoagulation Dosing History:  Anticoagulation Dose History     Recent Dosing and Labs Latest Ref Rng & Units 9/4/2015 8/1/2016 9/5/2017    INR 0.86 - 1.14 1.02 1.17(H) 1.02        Most Recent 3 Troponin's:  Recent Labs   Lab Test  09/05/18   1628  07/27/16   1400  12/07/15   1655   TROPI  0.027  <0.015  The 99th percentile for upper reference range is 0.045 ug/L.  Troponin values in   the range of 0.045 - 0.120 ug/L may be associated with risks of adverse   clinical events.    0.022     Most Recent Cholesterol Panel:  Recent Labs   Lab Test  05/30/18    1204   CHOL  146   LDL  67   HDL  61   TRIG  90     Most Recent 6 Bacteria Isolates From Any Culture (See EPIC Reports for Culture Details):  Recent Labs   Lab Test  09/05/18   2020  09/05/18   1940  08/02/16   1609  07/28/16   0620   CULT  No growth after 5 days  No growth after 5 days  Culture negative for acid fast bacilli  Assayed at [facility] [address]    No growth after 4 weeks  Culture negative after 4 weeks  No Legionella species isolated  No anaerobes isolated  No growth  Canceled, Test credited  >10 Squamous epithelial cells/low power field indicates oral contamination.   Please recollect.  Called to David Cobb [phone]  *     Most Recent TSH, T4 and A1c Labs:  Recent Labs   Lab Test  09/06/18   0602   04/06/16   1252   09/04/15   1040   TSH  2.05   < >  6.32*   < >   --    T4   --    --   0.90   < >   --    A1C   --    --    --    --   5.3    < > = values in this interval not displayed.     Results for orders placed or performed during the hospital encounter of 09/05/18   XR Chest 2 Views    Narrative    CHEST TWO VIEWS  9/5/2018 5:11 PM     HISTORY: Weakness, cough.     COMPARISON: 8/7/2016      Impression    IMPRESSION: Elevated left diaphragm. Interval clearing of interstitial  infiltrates noted on the previous exam. No acute infiltrates or  pleural effusion. Mitral annulus calcification.    RAJI THAKUR MD   US Abdomen Limited    Narrative    US ABDOMEN LIMITED  9/5/2018 6:38 PM      HISTORY: RUQ pain.      COMPARISON: None.    FINDINGS: The liver is normal in size and texture without focal mass.  There is no intra or extrahepatic biliary dilatation. The common bile  duct measures 0.5 cm.  The gallbladder is normal in appearance without  gallstones.  The pancreas head and body appear normal. The tail is  obscured by bowel gas.  The right kidney measures 9.4 cm and is normal  in appearance.      Impression    IMPRESSION: Normal right upper  quadrant. No gallstone or biliary  dilatation.    CLARA BHARDWAJ MD   Abd/pelvis CT no contrast - Stone Protocol    Narrative    CT ABDOMEN AND PELVIS WITHOUT CONTRAST   9/5/2018 7:57 PM     HISTORY: Abdominal pain, leukocytosis.     TECHNIQUE: Noncontrast CT abdomen and pelvis was performed. Radiation  dose for this scan was reduced using automated exposure control,  adjustment of the mA and/or kV according to patient size, or iterative  reconstruction technique.    COMPARISON: None.    FINDINGS:  Abdomen: There is mild atelectasis and scarring at the lung bases.  Coronary artery atherosclerotic calcifications. Mitral valve  calcifications. Evaluation of the solid abdominal organs is limited by  the lack of intravenous contrast. The liver, spleen, gallbladder,  pancreas, adrenal glands and kidneys are normal in appearance. There  is atherosclerotic calcification of aorta and its branches. No  aneurysm. There is no abdominal or pelvic lymph node enlargement.    Pelvis: Small and large bowel are filled with gas and fluid to the  rectum. No bowel obstruction. No evidence of inflammation. There is no  free intraperitoneal gas or fluid. There is a small posterior right  urinary bladder diverticulum. The prostate gland is mildly enlarged.      Impression    IMPRESSION: No acute abnormality. No bowel obstruction or  inflammation.    CLARA BHARDWAJ MD   XR Video Swallow w/o Esophagram    Narrative    VIDEO SWALLOWING EVALUATION   9/7/2018 9:29 AM     HISTORY: Status post CA of the VC and stroke. Globus sensation with  swallowing.     COMPARISON: 6/30/2015.    FLUOROSCOPY TIME: 4.1 minutes. 11 cine video clips were obtained.     FINDINGS:    Solid: Minimal residue. No aspiration or penetration.    Pill: Difficulty with oral transit. No aspiration. There was some  esophageal dysmotility. There was failure of relaxation of the lower  esophageal sphincter during the exam. The liquid readily passed the  lower esophageal  sphincter but the pill did not pass during the exam.    Pudding: Mild residue. No aspiration or penetration.    Honey: Not tested.    Nectar: Not tested.    Thin: Multiple swallows with penetration. One swallow did produce  aspiration without cough. There was moderate residue. There was  premature spillage into both the vallecula and piriform sinuses. The  penetration improved with chin tuck maneuver.    This study only includes the cervical esophagus.  For more detailed  information, please see speech therapy report.    JOSE F STEPHENS MD   CT Head w/o Contrast    Narrative    CT SCAN OF THE HEAD WITHOUT CONTRAST   9/6/2018 5:16 PM     HISTORY:  Weakness. Rule out stroke.     TECHNIQUE:  Axial images of the head and coronal reformations without  IV contrast material.  Radiation dose for this scan was reduced using  automated exposure control, adjustment of the mA and/or kV according  to patient size, or iterative reconstruction technique.    COMPARISON: None.    FINDINGS: There has been a previous left frontal tim hole. The  calvarium is otherwise intact. Vascular calcifications are seen at the  skull base. Mild to moderate cerebral atrophy is present. Minimal  nonspecific white matter changes are present. There is a low density  in the left corona radiata which is probably an old lacunar infarct,  although it is difficult to exclude a subacute infarct. There is also  an old left basal ganglia infarct. There is no evidence for  intracranial hemorrhage, mass effect, acute infarct, or skull  fracture.      Impression    IMPRESSION: Chronic changes. No evidence for intracranial hemorrhage  or any acute process. If clinically indicated, MRI of the brain  without and with contrast might be more sensitive in evaluating for a  subtle acute infarct.    JOSE F STEPHENS MD

## 2018-09-10 NOTE — PLAN OF CARE
Problem: Patient Care Overview  Goal: Plan of Care/Patient Progress Review  Outcome: Improving  A&Ox4.  Ax1 with cane.  Ambulation encouraged, patient refused to walk halls this evening.  Tolerates DD2 with thins.  Swallow precautions utilized to prevent choking.  Meds whole, one at a time without issue.  VSS on RA.  Denies pain.  Running IVF.  K 4.1.  LS clear, HIGH.  No diarrhea this shift.  PT, speech swallow consults.  Discharge to TCU 1-2 days.

## 2018-09-10 NOTE — PLAN OF CARE
Problem: Patient Care Overview  Goal: Plan of Care/Patient Progress Review  Outcome: Adequate for Discharge Date Met: 09/10/18  Pt A & O x4, up with SBA to BR. VSS on RA. Denies pain. Tolerating DD2 with thin liquids. No complaints of abdominal pain/cramping/ diarrhea. Protocols WNL discharging to TCU today.

## 2018-09-10 NOTE — PROGRESS NOTES
Discharge    Patient discharged to [facility] via wheelchair with wife      Listed belongings gathered and returned to patient. Yes  Care Plan and Patient education resolved: Yes  Prescriptions if needed, hard copies sent with patient  NA  Home and hospital acquired medications returned to patient: NA  Medication Bin checked and emptied on discharge Yes  Follow up appointment made for patient: No

## 2018-09-10 NOTE — PROGRESS NOTES
SW:  D:  Met with wife and patient.  Wife will transport patient to [facility] TCU today.  Patient has been a patient at [facility] before and thus they know where [facility] is located.  Orders have been faxed to [facility]. No scripts.  PAS-RR    D: Per DHS regulation, SW completed and submitted PAS-RR to [place] [employer] via the Senior LinkAge Line.  PAS-RR confirmation [id number]    I: SW spoke with patient/wife and they are aware a PAS-RR has been submitted.  SW reviewed with them that they may be contacted for a follow up appointment within 10 days of hospital discharge if their SNF stay is < 30 days.  Contact information for [facility] LinkAge Line was also provided.    A: They verbalized understanding.    P: Further questions may be directed to [facility] LinkAge Line at #[phone], option #4 for PAS-RR staff.

## 2018-09-10 NOTE — PLAN OF CARE
Problem: Patient Care Overview  Goal: Plan of Care/Patient Progress Review  Outcome: Improving  A/O x4. SBA w cane. VSS on ra. DD2 w thins, tolerating. Up to bathroom, no bm. Discharge to TCU pending 1-2 days. Nursing will continue to monitor.

## 2018-09-10 NOTE — PLAN OF CARE
Problem: Patient Care Overview  Goal: Plan of Care/Patient Progress Review  Discharge Planner SLP   Patient plan for discharge: TCU  Current status:  Patient seen for swallow treatment with his wife present prior to discharging to a TCU. Patient up in the chair and had just finished lunch. He ate 100% of his meal, he was able to recall his swallow strategies and wife stated he was implementing them at lunch. Education provided to both on the anatomy and physiology of the swallow by viewing images of his video swallow study and rationale for swallow stratetgies and modified diet. Recommend: 1. Continue on the DDL 2 with thin liquids. 2. Upright in a chair and after for 60 minutes, Chin Tuck with all liquids x2 and alternate liquids/solids.   Barriers to return to prior living situation: Generalized weakness.  Recommendations for discharge: TCU  Rationale for recommendations: Continue ST needs at TCU for diet advancement as tolerated.     Speech Language Therapy Discharge Summary    Reason for therapy discharge:    Discharged to transitional care facility.    Progress towards therapy goal(s). See goals on Care Plan in Epic electronic health record for goal details.  Goals partially met.  Barriers to achieving goals:   discharge from facility.    Therapy recommendation(s):    Continued therapy is recommended.  Rationale/Recommendations:  Continue for safe diet advancement..           Entered by: Do Issa 09/10/2018 1:41 PM

## 2018-09-10 NOTE — PLAN OF CARE
Problem: Patient Care Overview  Goal: Plan of Care/Patient Progress Review  PT:  Attempted to see patient for PT this AM. Patient dressed and sitting in chair, states he is discharging today. Will discharge from acute PT.     Physical Therapy Discharge Summary    Reason for therapy discharge:    Discharged to transitional care facility.    Progress towards therapy goal(s). See goals on Care Plan in [facility] electronic health record for goal details.  Goals not met.  Barriers to achieving goals:   discharge from facility.    Therapy recommendation(s):    Continued therapy is recommended.  Rationale/Recommendations:  Patient would benefit from continued skilled PT to further improve strength, balance, and independence with mobility and ambulation. .

## 2018-09-11 ENCOUNTER — NURSING HOME VISIT (OUTPATIENT)
Dept: GERIATRICS | Facility: CLINIC | Age: 73
End: 2018-09-11
Payer: MEDICARE

## 2018-09-11 DIAGNOSIS — I48.91 ATRIAL FIBRILLATION AND FLUTTER (H): ICD-10-CM

## 2018-09-11 DIAGNOSIS — K52.831 COLLAGENOUS COLITIS: Primary | ICD-10-CM

## 2018-09-11 DIAGNOSIS — R53.81 PHYSICAL DECONDITIONING: ICD-10-CM

## 2018-09-11 DIAGNOSIS — C32.0 CANCER OF VOCAL CORD (H): ICD-10-CM

## 2018-09-11 DIAGNOSIS — R13.10 DYSPHAGIA, UNSPECIFIED TYPE: ICD-10-CM

## 2018-09-11 DIAGNOSIS — N17.9 ACUTE KIDNEY INJURY (H): ICD-10-CM

## 2018-09-11 DIAGNOSIS — R19.7 ACUTE DIARRHEA: ICD-10-CM

## 2018-09-11 DIAGNOSIS — I48.92 ATRIAL FIBRILLATION AND FLUTTER (H): ICD-10-CM

## 2018-09-11 DIAGNOSIS — R74.01 TRANSAMINITIS: ICD-10-CM

## 2018-09-11 LAB
ANA SER QL IF: NEGATIVE
BACTERIA SPEC CULT: NO GROWTH
BACTERIA SPEC CULT: NO GROWTH
COPATH REPORT: NORMAL
Lab: NORMAL
Lab: NORMAL
SPECIMEN SOURCE: NORMAL
SPECIMEN SOURCE: NORMAL
TTG IGA SER-ACNC: 1 U/ML
TTG IGG SER-ACNC: 1 U/ML

## 2018-09-11 PROCEDURE — 99310 SBSQ NF CARE HIGH MDM 45: CPT | Performed by: NURSE PRACTITIONER

## 2018-09-11 NOTE — PROGRESS NOTES
"[facility]  PRIMARY CARE PROVIDER AND CLINIC:  AnnaositopeDanny Patric [address] / [place] [address]  Chief Complaint   Patient presents with     Hospital F/U     [place] Medical Record Number:  [id number]    HPI:    Efrem Ramos is a 73 year old  (1945), PMH of Atrial fib on eliquis, CVA, HLP, MGUS, vocal chord cancer in remission, acute disseminated encephalomyelitis, PAD, chronic colitis presents with acute diarrhea admitted to the [facility]  from [facility].  Hospital stay 9/5/18 through 9/10/18.   Collagenous colitis exacerbation: last colonoscopy 8/2013 incomplete due to redundant colon with biopsy + for collagenous colitis: current stool cultures negative for acute pathology, started on budesonide and scheduled imodium with improvement in diarrhea.  Dysphagia/voice change: Hx of vocal chord cancer believed to be in remission: s/p EGD 9/8 biopsy pending, video swallow with silent aspiration: possibility for tertiary esophageal contractions/spasms may need esophageal manometry as OP.  MISTI: improved with IVF, K+ replaced and Hypernatremia improved as well   Elevated liver transaminase: LFT elevated, intermittent RUQ abdominal pain: RUQ US negative, viral serology and workup pending, need OP GI/liver specialist. Hold statin  Weakness: son reported lethargy and weakness, Head CT negative, TCU for rehab   Admitted to this facility for  rehab, medical management and nursing care.  HPI information obtained from: facility chart records, facility staff, patient report and [facility] chart review.  Current issues are: On exam today patient is alert, sitting up in WC, denies pain or discomfort, states he is \"tired\" otherwise feeling well, states he has not had a BM in a \"few\" days states he is passing flatus, denies N/V or abdominal pain, states appetite is good, denies CP, palpitations, fever, chills, SOB, cough or congestion, states she is " sleeping well at night.      Last 3 BPs: 116/71, 116/69, 138/88 mmHg  HR Ranges: 54 bpm  Admission Weight: 9/10/18: 192.7 lbs      CODE STATUS/ADVANCE DIRECTIVES DISCUSSION:   CPR/Full code   Patient's living condition: lives with spouse    ALLERGIES:Adhesive tape; Amiodarone; Lasix [furosemide]; Penicillins; and Sulfa drugs  PAST MEDICAL HISTORY:  has a past medical history of Atrial fibrillation (H); Cancer (H) (vocal cord); Carpal tunnel syndrome; CVA (cerebral infarction) (5/5/2015); Demyelinating disease of central nervous system, unspecified; Dyspnea; ENCEPHALOPATHY UNSPECIFIED  (3/15/2005); Mixed hyperlipidemia (3/15/2005); Other and unspecified noninfectious gastroenteritis and colitis(558.9); Pneumonia (8/17/2016); Redundant colon; S/P coronary angiogram (09/05/2017); Shingles; SKIN DISORDERS NEC (3/15/2005); and Sleep apnea. He also has no past medical history of Arthritis; Asthma; Congestive heart failure, unspecified; Diabetes mellitus (H); Hypertension; or Thyroid disease.  PAST SURGICAL HISTORY:  has a past surgical history that includes NONSPECIFIC PROCEDURE (as a child); NONSPECIFIC PROCEDURE (early); Head and neck surgery; biopsy (brain 2002); Thoracoscopic wedge resection lung (Right, 8/2/2016); Bone marrow biopsy, bone specimen, needle/trocar (N/A, 6/8/2017); orthopedic surgery; and Esophagoscopy, gastroscopy, duodenoscopy (EGD), combined (N/A, 9/8/2018).  FAMILY HISTORY: family history includes Blood Disease in his mother; Cancer - colorectal in his maternal grandfather; Cardiovascular in his father; Diabetes (age of onset: 5) in his sister; Hypertension in his brother; Respiratory in an other family member.  SOCIAL HISTORY:  reports that he quit smoking about 5 years ago. His smoking use included Cigarettes. He has a 30.00 pack-year smoking history. He has never used smokeless tobacco. He reports that he drinks about 25.2 oz of alcohol per week  He reports that he does not use illicit  drugs.    Post Discharge Medication Reconciliation Status: discharge medications reconciled, continue medications without change.  Current Outpatient Prescriptions   Medication Sig Dispense Refill     apixaban ANTICOAGULANT (ELIQUIS) 5 MG tablet Take 1 tablet (5 mg) by mouth 2 times daily 180 tablet 2     ASPIRIN PO Take 81 mg by mouth every evening        budesonide (UCERIS) 9 MG 24 hr tablet Take 1 tablet (9 mg) by mouth daily 30 tablet 1     Cyanocobalamin 2000 MCG TABS Take 2,000 mcg by mouth every 7 days On Sundays       gabapentin (NEURONTIN) 300 MG capsule Take 2 capsules (600 mg) by mouth every evening 60 capsule 5     loperamide (IMODIUM) 2 MG capsule Take 2 capsules (4 mg) by mouth 4 times daily as needed for diarrhea 20 capsule      metoprolol succinate (TOPROL-XL) 25 MG 24 hr tablet Take 0.5 tablets (12.5 mg) by mouth daily (Patient taking differently: Take 12.5 mg by mouth every evening ) 45 tablet 2     multivitamin (OCUVITE) TABS Take 1 tablet by mouth 2 times daily        simethicone (MYLICON) 80 MG chewable tablet Take 1 tablet (80 mg) by mouth every 6 hours as needed for cramping 180 tablet        ROS:  10 point ROS of systems including Constitutional, Eyes, Respiratory, Cardiovascular, Gastroenterology, Genitourinary, Integumentary, Muscularskeletal, Psychiatric were all negative except for pertinent positives noted in my HPI.    Exam:  /71  Pulse 54  Temp 98.3  F (36.8  C)  Resp 16  Wt 192 lb 11.2 oz (87.4 kg)  SpO2 97%  BMI 29.3 kg/m2  GENERAL APPEARANCE:  Alert, in no distress  ENT:  Mouth and posterior oropharynx normal, moist mucous membranes, [place]  EYES:  EOM, conjunctivae, lids, pupils and irises normal, PERRL  RESP:  respiratory effort and palpation of chest normal, lungs clear to auscultation , no respiratory distress  CV:  Palpation and auscultation of heart done , regular rate and rhythm, no murmur, rub, or gallop, peripheral edema 1+ in LE bilaterally  ABDOMEN:  normal bowel  sounds, soft, nontender, no hepatosplenomegaly or other masses  M/S:   Examination of:   right upper extremity, left upper extremity, right lower extremity and left lower extremity  Inspection, ROM, stability and muscle strength normal  SKIN:  Inspection of skin and subcutaneous tissue baseline  NEURO:   Cranial nerves 2-12 are normal tested and grossly at patient's baseline, speech WNL  PSYCH:  affect and mood normal    Lab/Diagnostic data:  CBC RESULTS:   Recent Labs   Lab Test  09/10/18   0750  09/09/18   0601   WBC  11.2*  11.6*   RBC  3.48*  3.40*   HGB  11.2*  11.0*   HCT  33.2*  32.5*   MCV  95  96   MCH  32.2  32.4   MCHC  33.7  33.8   RDW  14.9  14.8   PLT  294  263       Last Basic Metabolic Panel:  Recent Labs   Lab Test  09/10/18   0750  09/09/18   1745  09/09/18   0601   NA  141   --   142   POTASSIUM  4.3  4.1  4.0   CHLORIDE  114*   --   115*   ULISSES  7.4*   --   7.2*   CO2  19*   --   19*   BUN  7   --   9   CR  0.69   --   0.79   GLC  95   --   82       Liver Function Studies -   Recent Labs   Lab Test  09/10/18   0750  09/09/18   0601   PROTTOTAL  6.9  6.4*   ALBUMIN  2.5*  2.4*   BILITOTAL  0.9  1.3   ALKPHOS  424*  448*   AST  159*  240*   ALT  183*  194*       TSH   Date Value Ref Range Status   09/06/2018 2.05 0.40 - 4.00 mU/L Final   05/30/2018 3.66 0.40 - 4.00 mU/L Final       ASSESSMENT/PLAN:  Collagenous colitis  Acute diarrhea  Acute kidney injury (H)  Physical deconditioning  Acute/ongoing: PT and OT for strengthening, BMP twice weekly, continue budesonide 9mg every day, imodium 2mg QID prn, simethicone 80mg q 6 hours prn   miralax 17gm every day prn    Cancer of vocal cord (H)  Dysphagia, unspecified type  Acute/ongoing: biopsy done with EGD 9/8/18 results pending  F/u with ENT as OP  ST evaluation and treatment    Transaminitis  Acute: CMP weekly, f/u as OP with GI    Atrial fibrillation and flutter (H)  Chronic: vitals daily and prn, BMP follow, continue toprol xl 12.5mg every day,  apixaban 5mg BID, ASA 81mg QD       Orders:  BMP on Mondays and CMP on thursdays  Hgb weekly on thursdays  miralax 17gm every day prn    Total time with patient visit 45 minutes including discussions about the POC and care coordination with the patient, review of chart, update orders          Electronically signed by:  Tonya Lynn Haase, APRN CNP

## 2018-09-11 NOTE — LETTER
"    9/11/2018        RE: Efrem Ramos  [address]  [facility] [address]        [facility]  PRIMARY CARE PROVIDER AND CLINIC:  Danny Paige [address] [facility] [address] / [place] [address]  Chief Complaint   Patient presents with     Hospital F/U     [facility] Medical Record Number:  [id number]    HPI:    Efrem Ramos is a 73 year old  (1945), PMH of Atrial fib on eliquis, CVA, HLP, MGUS, vocal chord cancer in remission, acute disseminated encephalomyelitis, PAD, chronic colitis presents with acute diarrhea admitted to the [facility]  from [facility].  Hospital stay 9/5/18 through 9/10/18.   Collagenous colitis exacerbation: last colonoscopy 8/2013 incomplete due to redundant colon with biopsy + for collagenous colitis: current stool cultures negative for acute pathology, started on budesonide and scheduled imodium with improvement in diarrhea.  Dysphagia/voice change: Hx of vocal chord cancer believed to be in remission: s/p EGD 9/8 biopsy pending, video swallow with silent aspiration: possibility for tertiary esophageal contractions/spasms may need esophageal manometry as OP.  MISTI: improved with IVF, K+ replaced and Hypernatremia improved as well   Elevated liver transaminase: LFT elevated, intermittent RUQ abdominal pain: RUQ US negative, viral serology and workup pending, need OP GI/liver specialist. Hold statin  Weakness: son reported lethargy and weakness, Head CT negative, TCU for rehab   Admitted to this facility for  rehab, medical management and nursing care.  HPI information obtained from: facility chart records, facility staff, patient report and [facility] chart review.  Current issues are: On exam today patient is alert, sitting up in WC, denies pain or discomfort, states he is \"tired\" otherwise feeling well, states he has not had a BM in a \"few\" days states he is passing flatus, denies N/V or abdominal pain, states appetite " is good, denies CP, palpitations, fever, chills, SOB, cough or congestion, states she is sleeping well at night.      Last 3 BPs: 116/71, 116/69, 138/88 mmHg  HR Ranges: 54 bpm  Admission Weight: 9/10/18: 192.7 lbs      CODE STATUS/ADVANCE DIRECTIVES DISCUSSION:   CPR/Full code   Patient's living condition: lives with spouse    ALLERGIES:Adhesive tape; Amiodarone; Lasix [furosemide]; Penicillins; and Sulfa drugs  PAST MEDICAL HISTORY:  has a past medical history of Atrial fibrillation (H); Cancer (H) (vocal cord); Carpal tunnel syndrome; CVA (cerebral infarction) (5/5/2015); Demyelinating disease of central nervous system, unspecified; Dyspnea; ENCEPHALOPATHY UNSPECIFIED  (3/15/2005); Mixed hyperlipidemia (3/15/2005); Other and unspecified noninfectious gastroenteritis and colitis(558.9); Pneumonia (8/17/2016); Redundant colon; S/P coronary angiogram (09/05/2017); Shingles; SKIN DISORDERS NEC (3/15/2005); and Sleep apnea. He also has no past medical history of Arthritis; Asthma; Congestive heart failure, unspecified; Diabetes mellitus (H); Hypertension; or Thyroid disease.  PAST SURGICAL HISTORY:  has a past surgical history that includes NONSPECIFIC PROCEDURE (as a child); NONSPECIFIC PROCEDURE (early); Head and neck surgery; biopsy (brain 2002); Thoracoscopic wedge resection lung (Right, 8/2/2016); Bone marrow biopsy, bone specimen, needle/trocar (N/A, 6/8/2017); orthopedic surgery; and Esophagoscopy, gastroscopy, duodenoscopy (EGD), combined (N/A, 9/8/2018).  FAMILY HISTORY: family history includes Blood Disease in his mother; Cancer - colorectal in his maternal grandfather; Cardiovascular in his father; Diabetes (age of onset: 5) in his sister; Hypertension in his brother; Respiratory in an other family member.  SOCIAL HISTORY:  reports that he quit smoking about 5 years ago. His smoking use included Cigarettes. He has a 30.00 pack-year smoking history. He has never used smokeless tobacco. He reports that he  drinks about 25.2 oz of alcohol per week  He reports that he does not use illicit drugs.    Post Discharge Medication Reconciliation Status: discharge medications reconciled, continue medications without change.  Current Outpatient Prescriptions   Medication Sig Dispense Refill     apixaban ANTICOAGULANT (ELIQUIS) 5 MG tablet Take 1 tablet (5 mg) by mouth 2 times daily 180 tablet 2     ASPIRIN PO Take 81 mg by mouth every evening        budesonide (UCERIS) 9 MG 24 hr tablet Take 1 tablet (9 mg) by mouth daily 30 tablet 1     Cyanocobalamin 2000 MCG TABS Take 2,000 mcg by mouth every 7 days On Sundays       gabapentin (NEURONTIN) 300 MG capsule Take 2 capsules (600 mg) by mouth every evening 60 capsule 5     loperamide (IMODIUM) 2 MG capsule Take 2 capsules (4 mg) by mouth 4 times daily as needed for diarrhea 20 capsule      metoprolol succinate (TOPROL-XL) 25 MG 24 hr tablet Take 0.5 tablets (12.5 mg) by mouth daily (Patient taking differently: Take 12.5 mg by mouth every evening ) 45 tablet 2     multivitamin (OCUVITE) TABS Take 1 tablet by mouth 2 times daily        simethicone (MYLICON) 80 MG chewable tablet Take 1 tablet (80 mg) by mouth every 6 hours as needed for cramping 180 tablet        ROS:  10 point ROS of systems including Constitutional, Eyes, Respiratory, Cardiovascular, Gastroenterology, Genitourinary, Integumentary, Muscularskeletal, Psychiatric were all negative except for pertinent positives noted in my HPI.    Exam:  /71  Pulse 54  Temp 98.3  F (36.8  C)  Resp 16  Wt 192 lb 11.2 oz (87.4 kg)  SpO2 97%  BMI 29.3 kg/m2  GENERAL APPEARANCE:  Alert, in no distress  ENT:  Mouth and posterior oropharynx normal, moist mucous membranes, [place]  EYES:  EOM, conjunctivae, lids, pupils and irises normal, PERRL  RESP:  respiratory effort and palpation of chest normal, lungs clear to auscultation , no respiratory distress  CV:  Palpation and auscultation of heart done , regular rate and rhythm, no  murmur, rub, or gallop, peripheral edema 1+ in LE bilaterally  ABDOMEN:  normal bowel sounds, soft, nontender, no hepatosplenomegaly or other masses  M/S:   Examination of:   right upper extremity, left upper extremity, right lower extremity and left lower extremity  Inspection, ROM, stability and muscle strength normal  SKIN:  Inspection of skin and subcutaneous tissue baseline  NEURO:   Cranial nerves 2-12 are normal tested and grossly at patient's baseline, speech WNL  PSYCH:  affect and mood normal    Lab/Diagnostic data:  CBC RESULTS:   Recent Labs   Lab Test  09/10/18   0750  09/09/18   0601   WBC  11.2*  11.6*   RBC  3.48*  3.40*   HGB  11.2*  11.0*   HCT  33.2*  32.5*   MCV  95  96   MCH  32.2  32.4   MCHC  33.7  33.8   RDW  14.9  14.8   PLT  294  263       Last Basic Metabolic Panel:  Recent Labs   Lab Test  09/10/18   0750  09/09/18   1745  09/09/18   0601   NA  141   --   142   POTASSIUM  4.3  4.1  4.0   CHLORIDE  114*   --   115*   ULISSES  7.4*   --   7.2*   CO2  19*   --   19*   BUN  7   --   9   CR  0.69   --   0.79   GLC  95   --   82       Liver Function Studies -   Recent Labs   Lab Test  09/10/18   0750  09/09/18   0601   PROTTOTAL  6.9  6.4*   ALBUMIN  2.5*  2.4*   BILITOTAL  0.9  1.3   ALKPHOS  424*  448*   AST  159*  240*   ALT  183*  194*       TSH   Date Value Ref Range Status   09/06/2018 2.05 0.40 - 4.00 mU/L Final   05/30/2018 3.66 0.40 - 4.00 mU/L Final       ASSESSMENT/PLAN:  Collagenous colitis  Acute diarrhea  Acute kidney injury (H)  Physical deconditioning  Acute/ongoing: PT and OT for strengthening, BMP twice weekly, continue budesonide 9mg every day, imodium 2mg QID prn, simethicone 80mg q 6 hours prn   miralax 17gm every day prn    Cancer of vocal cord (H)  Dysphagia, unspecified type  Acute/ongoing: biopsy done with EGD 9/8/18 results pending  F/u with ENT as OP  ST evaluation and treatment    Transaminitis  Acute: CMP weekly, f/u as OP with GI    Atrial fibrillation and flutter  (H)  Chronic: vitals daily and prn, BMP follow, continue toprol xl 12.5mg every day, apixaban 5mg BID, ASA 81mg QD       Orders:  BMP on Mondays and CMP on thursdays  Hgb weekly on thursdays  miralax 17gm every day prn    Total time with patient visit 45 minutes including discussions about the POC and care coordination with the patient, review of chart, update orders          Electronically signed by:  Tonya Lynn Haase, APRN CNP                    Sincerely,        Tonya Lynn Haase, APRN CNP

## 2018-09-12 LAB — SMA IGG SER-ACNC: 32 UNITS (ref 0–19)

## 2018-09-13 ENCOUNTER — TRANSFERRED RECORDS (OUTPATIENT)
Dept: HEALTH INFORMATION MANAGEMENT | Facility: CLINIC | Age: 73
End: 2018-09-13

## 2018-09-13 LAB
ALBUMIN SERPL-MCNC: 2.9 G/DL (ref 3.4–5)
ALP SERPL-CCNC: 279 U/L (ref 40–150)
ALT SERPL-CCNC: 96 U/L (ref 9–55)
AST SERPL-CCNC: 66 U/L (ref 10–40)
BILIRUB SERPL-MCNC: 1 MG/DL (ref 0.2–1.2)
BUN SERPL-MCNC: <10 MG/DL (ref 9–26)
CALCIUM SERPL-MCNC: 8.7 MG/DL (ref 8.4–10.4)
CHLORIDE SERPLBLD-SCNC: 108 MMOL/L (ref 98–109)
CO2 SERPL-SCNC: 19 MMOL/L (ref 22–31)
CREAT SERPL-MCNC: 0.79 MG/DL (ref 0.73–1.18)
GFR SERPL CREATININE-BSD FRML MDRD: >60 ML/MIN/1.73M2
GLUCOSE SERPL-MCNC: 78 MG/DL (ref 70–100)
HEMOGLOBIN: 11.8 G/DL (ref 13.4–17.5)
POTASSIUM SERPL-SCNC: 4.1 MMOL/L (ref 3.5–5.2)
PROT SERPL-MCNC: 7.1 G/DL (ref 6.4–8.3)
SODIUM SERPL-SCNC: 139 MMOL/L (ref 136–145)

## 2018-09-14 LAB — SMOOTH MUSCLE ABY IGG TITER: ABNORMAL

## 2018-09-15 ENCOUNTER — NURSING HOME VISIT (OUTPATIENT)
Dept: GERIATRICS | Facility: CLINIC | Age: 73
End: 2018-09-15
Payer: MEDICARE

## 2018-09-15 DIAGNOSIS — K52.831 COLLAGENOUS COLITIS: Primary | ICD-10-CM

## 2018-09-15 DIAGNOSIS — R74.01 TRANSAMINITIS: ICD-10-CM

## 2018-09-15 DIAGNOSIS — I48.92 ATRIAL FIBRILLATION AND FLUTTER (H): ICD-10-CM

## 2018-09-15 DIAGNOSIS — R13.10 DYSPHAGIA, UNSPECIFIED TYPE: ICD-10-CM

## 2018-09-15 DIAGNOSIS — N17.9 ACUTE KIDNEY INJURY (H): ICD-10-CM

## 2018-09-15 DIAGNOSIS — I48.91 ATRIAL FIBRILLATION AND FLUTTER (H): ICD-10-CM

## 2018-09-15 PROCEDURE — 99306 1ST NF CARE HIGH MDM 50: CPT | Performed by: INTERNAL MEDICINE

## 2018-09-17 ENCOUNTER — TRANSFERRED RECORDS (OUTPATIENT)
Dept: HEALTH INFORMATION MANAGEMENT | Facility: CLINIC | Age: 73
End: 2018-09-17

## 2018-09-17 VITALS
RESPIRATION RATE: 18 BRPM | TEMPERATURE: 97.4 F | SYSTOLIC BLOOD PRESSURE: 126 MMHG | HEART RATE: 73 BPM | WEIGHT: 193.3 LBS | OXYGEN SATURATION: 97 % | BODY MASS INDEX: 29.39 KG/M2 | DIASTOLIC BLOOD PRESSURE: 79 MMHG

## 2018-09-17 LAB
BUN SERPL-MCNC: <10 MG/DL (ref 9–26)
CALCIUM SERPL-MCNC: 8.9 MG/DL (ref 8.4–10.4)
CHLORIDE SERPLBLD-SCNC: 105 MMOL/L (ref 98–109)
CO2 SERPL-SCNC: 29 MMOL/L (ref 22–31)
CREAT SERPL-MCNC: 0.8 MG/DL (ref 0.73–1.18)
GFR SERPL CREATININE-BSD FRML MDRD: >60 ML/MIN/1.73M2
GLUCOSE SERPL-MCNC: 79 MG/DL (ref 70–100)
POTASSIUM SERPL-SCNC: 4.4 MMOL/L (ref 3.5–5.2)
SODIUM SERPL-SCNC: 140 MMOL/L (ref 136–145)

## 2018-09-17 NOTE — PROGRESS NOTES
[facility]  PRIMARY CARE PROVIDER AND CLINIC:  Danny Paige [address] / [place] [address]      Pt was seen by Dr Lopez on 9/15/18 for an initial TCU visit      HPI:    Efrem Ramos is a 73 year old  (1945), PMH of Atrial fib on eliquis, CVA, MGUS, vocal chord cancer in remission, acute disseminated encephalomyelitis, PAD, chronic colitis, who was hospitalized at [facility] from 9/5/18-9/10/18 for the management of acute diarrhea     Hospital course reviewed by me, is as per the hospital dc summary and NP note.    Pt was started on budesonide for a planned 8 week course, after stool studies neg, with improvement in diarrhea.    Pt was evaluated for dysphagia with a video swallow and EGD  Was found to have silent aspiration with thin liquids  EGD revealed minimal Shatzki's ring. Biopsy of GE junction revealed chronic active inflammation   MISTI on admission resolved with IV fluids  Transaminases were elevated, though down trending  RUQ US was nl, serologic eval neg       Admitted to this facility for  rehab, medical management and nursing care.    Pt states he feels well, is getting stronger  He states he is now constipated  He denies abd pain, nausea, fevers, chills, cough, SOB, difficulty swallowing    Vitals have been stable since admission    CODE STATUS/ADVANCE DIRECTIVES DISCUSSION:   CPR/Full code   Patient's living condition: lives with spouse    ALLERGIES:Adhesive tape; Amiodarone; Lasix [furosemide]; Penicillins; and Sulfa drugs  PAST MEDICAL HISTORY:  has a past medical history of Atrial fibrillation (H); Cancer (H) (vocal cord); Carpal tunnel syndrome; CVA (cerebral infarction) (5/5/2015); Demyelinating disease of central nervous system, unspecified; Dyspnea; ENCEPHALOPATHY UNSPECIFIED  (3/15/2005); Mixed hyperlipidemia (3/15/2005); Other and unspecified noninfectious gastroenteritis and colitis(558.9); Pneumonia (8/17/2016); Redundant colon; S/P coronary angiogram  (09/05/2017); Shingles; SKIN DISORDERS NEC (3/15/2005); and Sleep apnea. He also has no past medical history of Arthritis; Asthma; Congestive heart failure, unspecified; Diabetes mellitus (H); Hypertension; or Thyroid disease.  PAST SURGICAL HISTORY:  has a past surgical history that includes NONSPECIFIC PROCEDURE (as a child); NONSPECIFIC PROCEDURE (early); Head and neck surgery; biopsy (brain 2002); Thoracoscopic wedge resection lung (Right, 8/2/2016); Bone marrow biopsy, bone specimen, needle/trocar (N/A, 6/8/2017); orthopedic surgery; and Esophagoscopy, gastroscopy, duodenoscopy (EGD), combined (N/A, 9/8/2018).  FAMILY HISTORY: family history includes Blood Disease in his mother; Cancer - colorectal in his maternal grandfather; Cardiovascular in his father; Diabetes (age of onset: 5) in his sister; Hypertension in his brother; Respiratory in an other family member.  SOCIAL HISTORY:  reports that he quit smoking about 5 years ago. His smoking use included Cigarettes. He has a 30.00 pack-year smoking history. He has never used smokeless tobacco. He reports that he drinks about 25.2 oz of alcohol per week  He reports that he does not use illicit drugs.        Post Discharge Medication Reconciliation Status:   .  Current Outpatient Prescriptions   Medication Sig Dispense Refill     apixaban ANTICOAGULANT (ELIQUIS) 5 MG tablet Take 1 tablet (5 mg) by mouth 2 times daily 180 tablet 2     ASPIRIN PO Take 81 mg by mouth every evening        budesonide (UCERIS) 9 MG 24 hr tablet Take 1 tablet (9 mg) by mouth daily 30 tablet 1     Cyanocobalamin 2000 MCG TABS Take 2,000 mcg by mouth every 7 days On Sundays       gabapentin (NEURONTIN) 300 MG capsule Take 2 capsules (600 mg) by mouth every evening 60 capsule 5     loperamide (IMODIUM) 2 MG capsule Take 2 capsules (4 mg) by mouth 4 times daily as needed for diarrhea 20 capsule      metoprolol succinate (TOPROL-XL) 25 MG 24 hr tablet Take 0.5 tablets (12.5 mg) by mouth daily  (Patient taking differently: Take 12.5 mg by mouth every evening ) 45 tablet 2     multivitamin (OCUVITE) TABS Take 1 tablet by mouth 2 times daily        simethicone (MYLICON) 80 MG chewable tablet Take 1 tablet (80 mg) by mouth every 6 hours as needed for cramping 180 tablet        ROS:  10 point ROS neg except as noted above    Exam:    GENERAL APPEARANCE:  Alert, in no distress, appears well, sitting at bedside  ENT:  Oral mucosa moist, voice nl sounding  EYES:  EOM, conjunctivae, lids, pupils and irises normal  RESP: RR 12, lungs clear  CV: RRR no M  ABDOMEN:  Soft, non-tender  M/S:   No LE edema  SKIN:  No rash  NEURO:   Pt is alert, fully oriented, facies symmetric  No focal weakness. No focal weakness  Gait was not assessed  PSYCH:  affect and mood normal    Lab/Diagnostic data:  CBC RESULTS:   Recent Labs   Lab Test  09/10/18   0750  09/09/18   0601   WBC  11.2*  11.6*   RBC  3.48*  3.40*   HGB  11.2*  11.0*   HCT  33.2*  32.5*   MCV  95  96   MCH  32.2  32.4   MCHC  33.7  33.8   RDW  14.9  14.8   PLT  294  263       Last Basic Metabolic Panel:  Recent Labs   Lab Test  09/10/18   0750  09/09/18   1745  09/09/18   0601   NA  141   --   142   POTASSIUM  4.3  4.1  4.0   CHLORIDE  114*   --   115*   ULISSES  7.4*   --   7.2*   CO2  19*   --   19*   BUN  7   --   9   CR  0.69   --   0.79   GLC  95   --   82       Liver Function Studies -   Recent Labs   Lab Test  09/10/18   0750  09/09/18   0601   PROTTOTAL  6.9  6.4*   ALBUMIN  2.5*  2.4*   BILITOTAL  0.9  1.3   ALKPHOS  424*  448*   AST  159*  240*   ALT  183*  194*       TSH   Date Value Ref Range Status   09/06/2018 2.05 0.40 - 4.00 mU/L Final   05/30/2018 3.66 0.40 - 4.00 mU/L Final       ASSESSMENT/PLAN:    Collagenous colitis  Acute on chronic diarrhea, likely related to above  Improved with budesonide  Plan continue budesonide for anticipated 8 weeks  Monitor bowel status.  GI f/u  PT/OT    Acute kidney injury (H)  Resolved  Plan monitor intake, renal  function      Cancer of vocal cord (H)  Dysphagia, unspecified type  Plan speech path eval. ENT eval  Consider H2 Blocker or PPI therapy for esophogeal inflammation noted on biopsy.    Transaminitis  Etiology not clear, resolving  F-actin antibody weakly +, of unclear significance.   Plan monitor CMP, GI f/u if transaminases do not completely resolve    Atrial fibrillation and flutter (H)  HR controlled  Plan continue toprol XL, apixaban        Perry Lopez MD

## 2018-09-18 ENCOUNTER — NURSING HOME VISIT (OUTPATIENT)
Dept: GERIATRICS | Facility: CLINIC | Age: 73
End: 2018-09-18
Payer: MEDICARE

## 2018-09-18 DIAGNOSIS — R13.10 DYSPHAGIA, UNSPECIFIED TYPE: ICD-10-CM

## 2018-09-18 DIAGNOSIS — I48.92 ATRIAL FIBRILLATION AND FLUTTER (H): ICD-10-CM

## 2018-09-18 DIAGNOSIS — C32.0 CANCER OF VOCAL CORD (H): ICD-10-CM

## 2018-09-18 DIAGNOSIS — R19.7 ACUTE DIARRHEA: ICD-10-CM

## 2018-09-18 DIAGNOSIS — N17.9 ACUTE KIDNEY INJURY (H): ICD-10-CM

## 2018-09-18 DIAGNOSIS — R74.01 TRANSAMINITIS: ICD-10-CM

## 2018-09-18 DIAGNOSIS — K52.831 COLLAGENOUS COLITIS: Primary | ICD-10-CM

## 2018-09-18 DIAGNOSIS — K22.9 ESOPHAGEAL ABNORMALITY: ICD-10-CM

## 2018-09-18 DIAGNOSIS — R53.81 PHYSICAL DECONDITIONING: ICD-10-CM

## 2018-09-18 DIAGNOSIS — I48.91 ATRIAL FIBRILLATION AND FLUTTER (H): ICD-10-CM

## 2018-09-18 PROCEDURE — 99309 SBSQ NF CARE MODERATE MDM 30: CPT | Performed by: NURSE PRACTITIONER

## 2018-09-18 NOTE — LETTER
9/18/2018        RE: Efrem Ramos  [address]  [facility] [address]        [facility]    Chief Complaint   Patient presents with     snf Acute       [facility] Medical Record Number:  [id number]    HPI:    Efrem Ramos is a 73 year old  (1945), who is being seen today for an episodic care visit at [facility] .  HPI information obtained from: facility chart records, facility staff, patient report and [facility] chart review.    Today's concern is:  Collagenous colitis  Acute diarrhea  Acute kidney injury (H)  Last colonoscopy 8/2013 incomplete due to redundant colon with biopsy + for collagenous colitis: current stool cultures negative for acute pathology, started on budesonide  Patient reports diarrhea has resolved. Had no stools x 4 days, then had two movements in the past two days. Stable renal function.   Lab Results   Component Value Date    CR 0.79 09/13/2018    CR 0.69 09/10/2018     Lab Results   Component Value Date    POTASSIUM 4.1 09/13/2018    POTASSIUM 4.3 09/10/2018       Physical deconditioning  Continues therapies. Up with walker. No dyspnea.     Cancer of vocal cord (H)  Dysphagia, unspecified type  Esophageal abnormality  Patient continues on altered diet. Reports some coughing with meals. Due to esophageal inflammation noted on biopsy TCU MD recommend PPI.     Transaminitis  RUQ US negative, viral serology and workup pending, need OP GI/liver specialist. Holding statin. Liver Function Studies improving.    Recent Labs   Lab Test 09/13/18   PROTTOTAL  7.1   ALBUMIN  2.9*   BILITOTAL  1.0   ALKPHOS  279*   AST  66*   ALT  96*       Atrial fibrillation and flutter (H)  Continues on Eliquis, no s/s bleeding or bruising. Rate controlled with Toprol XL. Admission Weight: 9/10/18: 192.7 lbs  Current Weight: 9/18/18: 191 lbs.  BP: /64-88 mmHg    ALLERGIES: Adhesive tape; Amiodarone; Lasix [furosemide]; Penicillins; and Sulfa drugs  Past  Medical, Surgical, Family and Social History reviewed and updated in [facility].    Current Outpatient Prescriptions   Medication Sig Dispense Refill     apixaban ANTICOAGULANT (ELIQUIS) 5 MG tablet Take 1 tablet (5 mg) by mouth 2 times daily 180 tablet 2     ASPIRIN PO Take 81 mg by mouth every evening        budesonide (UCERIS) 9 MG 24 hr tablet Take 1 tablet (9 mg) by mouth daily 30 tablet 1     Cyanocobalamin 2000 MCG TABS Take 2,000 mcg by mouth every 7 days On Sundays       gabapentin (NEURONTIN) 300 MG capsule Take 2 capsules (600 mg) by mouth every evening 60 capsule 5     loperamide (IMODIUM) 2 MG capsule Take 2 capsules (4 mg) by mouth 4 times daily as needed for diarrhea 20 capsule      metoprolol succinate (TOPROL-XL) 25 MG 24 hr tablet Take 0.5 tablets (12.5 mg) by mouth daily (Patient taking differently: Take 12.5 mg by mouth every evening ) 45 tablet 2     multivitamin (OCUVITE) TABS Take 1 tablet by mouth 2 times daily        Omeprazole (PRILOSEC PO) Take 20 mg by mouth every morning       simethicone (MYLICON) 80 MG chewable tablet Take 1 tablet (80 mg) by mouth every 6 hours as needed for cramping 180 tablet      Medications reviewed:  Medications reconciled to facility chart and changes were made to reflect current medications as identified as above med list. Below are the changes that were made:   Medications stopped since last EPIC medication reconciliation:   There are no discontinued medications.    Medications started since last [facility] medication reconciliation:  No orders of the defined types were placed in this encounter.    REVIEW OF SYSTEMS:  10 point ROS of systems including Constitutional, Eyes, Respiratory, Cardiovascular, Gastroenterology, Genitourinary, Integumentary, Musculoskeletal, Psychiatric were all negative except for pertinent positives noted in my HPI.    Physical Exam:  /79  Pulse 73  Temp 97.4  F (36.3  C)  Resp 18  Wt 193 lb 4.8 oz (87.7 kg)  SpO2 97%  BMI 29.39  kg/m2  GENERAL APPEARANCE:  Alert, in no distress, pleasant, cooperative, oriented x 4  EYES:  EOM, lids, pupils and irises normal, sclera clear and conjunctiva normal, no discharge or mattering on lids or lashes noted  ENT:  Mouth normal, moist mucous membranes, nose normal without drainage or crusting, external ears without lesions, hearing acuity intact  RESP:  respiratory effort and palpation of chest normal, no chest wall tenderness, no respiratory distress, Lung sounds clear, patient is on room air  CV:  Palpation and auscultation of heart done, rate and rhythm controlled and sounds regular today, no murmur, no rub or gallop. Edema +1 pitting bilateral lower extremities.   ABDOMEN:  normal bowel sounds, soft, nontender.  M/S:   Gait and station ambulates independently with walker, no tenderness or swelling of the joints; able to move all extremities   NEURO: cranial nerves 2-12 grossly intact, no facial asymmetry, no speech deficits and able to follow directions, moves all extremities symmetrically  PSYCH:  insight and judgement intact, memory at baseline, affect and mood normal     Recent Labs:   9/17/18 BUN 10, Cr 0.8, Na 140 and K 4.4  CBC RESULTS:   Recent Labs   Lab Test 09/13/18  09/10/18   0750  09/09/18   0601   WBC   --   11.2*  11.6*   RBC   --   3.48*  3.40*   HGB  11.8*  11.2*  11.0*   HCT   --   33.2*  32.5*   MCV   --   95  96   MCH   --   32.2  32.4   MCHC   --   33.7  33.8   RDW   --   14.9  14.8   PLT   --   294  263       Last Basic Metabolic Panel:  Recent Labs   Lab Test 09/13/18  09/10/18   0750   NA  139  141   POTASSIUM  4.1  4.3   CHLORIDE  108  114*   ULISSES  8.7  7.4*   CO2  19*  19*   BUN  <10  7   CR  0.79  0.69   GLC  78  95       Liver Function Studies -   Recent Labs   Lab Test 09/13/18  09/10/18   0750   PROTTOTAL  7.1  6.9   ALBUMIN  2.9*  2.5*   BILITOTAL  1.0  0.9   ALKPHOS  279*  424*   AST  66*  159*   ALT  96*  183*       TSH   Date Value Ref Range Status   09/06/2018 2.05  0.40 - 4.00 mU/L Final   05/30/2018 3.66 0.40 - 4.00 mU/L Final       Assessment/Plan:  Collagenous colitis  Acute diarrhea  Acute kidney injury (H)  Diarrhea resolved. Meds as above. Monitor renal function weekly and F/U next week.     Physical deconditioning  Acute on chronic. Therapies. F/U with progress next week.     Cancer of vocal cord (H)  Dysphagia, unspecified type  Esophageal abnormality  Chronic issues. ENT f/u as outpatient. Add PPI due to esophageal inflammation.     Transaminitis  Improving LFTs. Monitor.     Atrial fibrillation and flutter (H)  Chronic, rate controlled. Eliquis as ordered.     Orders:  1. Per MD recommendations start Prilosec 20 mg PO daily diagnosis esophageal inflammation    Electronically signed by  FABI Hernandez CNP                      Sincerely,        FABI Hernandez CNP

## 2018-09-18 NOTE — MR AVS SNAPSHOT
After Visit Summary   9/18/2018    Efrem Ramos    MRN: [id number]           Patient Information     Date Of Birth          1945        Visit Information        Provider Department      9/18/2018 8:30 AM Kavya Gibbs APRN CNP Ger[facility] Transitional [facility]        Today's Diagnoses     Collagenous colitis    -  1    Acute diarrhea        Acute kidney injury (H)        Physical deconditioning        Cancer of vocal cord (H)        Dysphagia, unspecified type        Transaminitis        Atrial fibrillation and flutter (H)        Esophageal abnormality           Follow-ups after your visit        Your next 10 appointments already scheduled     Sep 19, 2018  1:15 PM CDT   Return Visit with  Oncology Nurse   [facility] ([facility]    [facility] [place]  [address]  [place] [address]   [phone]            Sep 26, 2018  1:00 PM CDT   Return Visit with Shae Aldana MD   [facility]    [facility] [place]  [address]  [place] [address]   [phone]            Aug 14, 2019  1:30 PM CDT   Return Sleep Patient with Ron Pacheco MD   [facility] [place] ([facility] [place]    [address] [facility] [address]  [place] [address] [phone]              Who to contact     If you have questions or need follow up information about today's clinic visit or your schedule please contact G[facility] directly at [phone].  Normal or non-critical lab and imaging results will be communicated to you by MyChart, letter or phone within 4 business days after the clinic has received the results. If you do not hear from us within 7 days, please contact the clinic through [employer] or phone. If you have a critical or abnormal lab result, we will notify you by phone as soon as possible.  Submit refill requests through [employer] or call your  pharmacy and they will forward the refill request to us. Please allow 3 business days for your refill to be completed.          Additional Information About Your Visit        [employer] Information     [employer] gives you secure access to your electronic health record. If you see a primary care provider, you can also send messages to your care team and make appointments. If you have questions, please call your primary care clinic.  If you do not have a primary care provider, please call [phone] and they will assist you.        Care EveryWhere ID     This is your Care EveryWhere ID. This could be used by other organizations to access your [facility] medical records  [id number]        Your Vitals Were     Pulse Temperature Respirations Pulse Oximetry BMI (Body Mass Index)       73 97.4  F (36.3  C) 18 97% 29.39 kg/m2        Blood Pressure from Last 3 Encounters:   09/17/18 126/79   09/10/18 116/71   09/10/18 133/90    Weight from Last 3 Encounters:   09/17/18 193 lb 4.8 oz (87.7 kg)   09/10/18 192 lb 11.2 oz (87.4 kg)   09/10/18 186 lb 3.2 oz (84.5 kg)              Today, you had the following     No orders found for display         Today's Medication Changes          These changes are accurate as of 9/18/18  4:37 PM.  If you have any questions, ask your nurse or doctor.               These medicines have changed or have updated prescriptions.        Dose/Directions    metoprolol succinate 25 MG 24 hr tablet   Commonly known as:  TOPROL-XL   This may have changed:  when to take this   Used for:  Nonrheumatic aortic valve stenosis        Dose:  12.5 mg   Take 0.5 tablets (12.5 mg) by mouth daily   Quantity:  45 tablet   Refills:  2                Primary Care Provider Office Phone # Fax #    Danny Paige [phone] [address] [facility] [address]  [facility] [address]        Equal Access to Services     JAMEEL HARRIS AH: Tika Hodge, codi alexander, keyanna merino,  alfonzo oquendo yris sharp'aan ah. So [facility] [phone].    ATENCIÓN: Si carola marielle, tiene a palomo disposición servicios gratuitos de asistencia lingüística. Shilo watt [phone].    We comply with applicable federal civil rights laws and [place] laws. We do not discriminate on the basis of race, color, national origin, age, disability, sex, sexual orientation, or gender identity.            Thank you!     Thank you for choosing [facility]  for your care. Our goal is always to provide you with excellent care. Hearing back from our patients is one way we can continue to improve our services. Please take a few minutes to complete the written survey that you may receive in the mail after your visit with us. Thank you!             Your Updated Medication List - Protect others around you: Learn how to safely use, store and throw away your medicines at www.disposemymeds.org.          This list is accurate as of 9/18/18  4:37 PM.  Always use your most recent med list.                   Brand Name Dispense Instructions for use Diagnosis    apixaban ANTICOAGULANT 5 MG tablet    ELIQUIS    180 tablet    Take 1 tablet (5 mg) by mouth 2 times daily    Atrial fibrillation and flutter (H)       ASPIRIN PO      Take 81 mg by mouth every evening        budesonide 9 MG 24 hr tablet    UCERIS    30 tablet    Take 1 tablet (9 mg) by mouth daily    Collagenous colitis       Cyanocobalamin 2000 MCG Tabs      Take 2,000 mcg by mouth every 7 days On Sundays        gabapentin 300 MG capsule    NEURONTIN    60 capsule    Take 2 capsules (600 mg) by mouth every evening    Restless legs syndrome (RLS)       loperamide 2 MG capsule    IMODIUM    20 capsule    Take 2 capsules (4 mg) by mouth 4 times daily as needed for diarrhea    Diarrhea, unspecified type       metoprolol succinate 25 MG 24 hr tablet    TOPROL-XL    45 tablet    Take 0.5 tablets (12.5 mg) by mouth daily    Nonrheumatic aortic valve stenosis        multivitamin Tabs tablet      Take 1 tablet by mouth 2 times daily        PRILOSEC PO      Take 20 mg by mouth every morning        simethicone 80 MG chewable tablet    MYLICON    180 tablet    Take 1 tablet (80 mg) by mouth every 6 hours as needed for cramping    Flatulence, eructation and gas pain

## 2018-09-18 NOTE — PROGRESS NOTES
[facility]    Chief Complaint   Patient presents with     long term Acute       [facility] Medical Record Number:  [id number]    HPI:    Efrem Ramos is a 73 year old  (1945), who is being seen today for an episodic care visit at [facility] .  HPI information obtained from: facility chart records, facility staff, patient report and [facility] chart review.    Today's concern is:  Collagenous colitis  Acute diarrhea  Acute kidney injury (H)  Last colonoscopy 8/2013 incomplete due to redundant colon with biopsy + for collagenous colitis: current stool cultures negative for acute pathology, started on budesonide  Patient reports diarrhea has resolved. Had no stools x 4 days, then had two movements in the past two days. Stable renal function.   Lab Results   Component Value Date    CR 0.79 09/13/2018    CR 0.69 09/10/2018     Lab Results   Component Value Date    POTASSIUM 4.1 09/13/2018    POTASSIUM 4.3 09/10/2018       Physical deconditioning  Continues therapies. Up with walker. No dyspnea.     Cancer of vocal cord (H)  Dysphagia, unspecified type  Esophageal abnormality  Patient continues on altered diet. Reports some coughing with meals. Due to esophageal inflammation noted on biopsy TCU MD recommend PPI.     Transaminitis  RUQ US negative, viral serology and workup pending, need OP GI/liver specialist. Holding statin. Liver Function Studies improving.    Recent Labs   Lab Test 09/13/18   PROTTOTAL  7.1   ALBUMIN  2.9*   BILITOTAL  1.0   ALKPHOS  279*   AST  66*   ALT  96*       Atrial fibrillation and flutter (H)  Continues on Eliquis, no s/s bleeding or bruising. Rate controlled with Toprol XL. Admission Weight: 9/10/18: 192.7 lbs  Current Weight: 9/18/18: 191 lbs.  BP: /64-88 mmHg    ALLERGIES: Adhesive tape; Amiodarone; Lasix [furosemide]; Penicillins; and Sulfa drugs  Past Medical, Surgical, Family and Social History reviewed and updated in EPIC.    Current  Outpatient Prescriptions   Medication Sig Dispense Refill     apixaban ANTICOAGULANT (ELIQUIS) 5 MG tablet Take 1 tablet (5 mg) by mouth 2 times daily 180 tablet 2     ASPIRIN PO Take 81 mg by mouth every evening        budesonide (UCERIS) 9 MG 24 hr tablet Take 1 tablet (9 mg) by mouth daily 30 tablet 1     Cyanocobalamin 2000 MCG TABS Take 2,000 mcg by mouth every 7 days On Sundays       gabapentin (NEURONTIN) 300 MG capsule Take 2 capsules (600 mg) by mouth every evening 60 capsule 5     loperamide (IMODIUM) 2 MG capsule Take 2 capsules (4 mg) by mouth 4 times daily as needed for diarrhea 20 capsule      metoprolol succinate (TOPROL-XL) 25 MG 24 hr tablet Take 0.5 tablets (12.5 mg) by mouth daily (Patient taking differently: Take 12.5 mg by mouth every evening ) 45 tablet 2     multivitamin (OCUVITE) TABS Take 1 tablet by mouth 2 times daily        Omeprazole (PRILOSEC PO) Take 20 mg by mouth every morning       simethicone (MYLICON) 80 MG chewable tablet Take 1 tablet (80 mg) by mouth every 6 hours as needed for cramping 180 tablet      Medications reviewed:  Medications reconciled to facility chart and changes were made to reflect current medications as identified as above med list. Below are the changes that were made:   Medications stopped since last EPIC medication reconciliation:   There are no discontinued medications.    Medications started since last [facility] medication reconciliation:  No orders of the defined types were placed in this encounter.    REVIEW OF SYSTEMS:  10 point ROS of systems including Constitutional, Eyes, Respiratory, Cardiovascular, Gastroenterology, Genitourinary, Integumentary, Musculoskeletal, Psychiatric were all negative except for pertinent positives noted in my HPI.    Physical Exam:  /79  Pulse 73  Temp 97.4  F (36.3  C)  Resp 18  Wt 193 lb 4.8 oz (87.7 kg)  SpO2 97%  BMI 29.39 kg/m2  GENERAL APPEARANCE:  Alert, in no distress, pleasant, cooperative, oriented x  4  EYES:  EOM, lids, pupils and irises normal, sclera clear and conjunctiva normal, no discharge or mattering on lids or lashes noted  ENT:  Mouth normal, moist mucous membranes, nose normal without drainage or crusting, external ears without lesions, hearing acuity intact  RESP:  respiratory effort and palpation of chest normal, no chest wall tenderness, no respiratory distress, Lung sounds clear, patient is on room air  CV:  Palpation and auscultation of heart done, rate and rhythm controlled and sounds regular today, no murmur, no rub or gallop. Edema +1 pitting bilateral lower extremities.   ABDOMEN:  normal bowel sounds, soft, nontender.  M/S:   Gait and station ambulates independently with walker, no tenderness or swelling of the joints; able to move all extremities   NEURO: cranial nerves 2-12 grossly intact, no facial asymmetry, no speech deficits and able to follow directions, moves all extremities symmetrically  PSYCH:  insight and judgement intact, memory at baseline, affect and mood normal     Recent Labs:   9/17/18 BUN 10, Cr 0.8, Na 140 and K 4.4  CBC RESULTS:   Recent Labs   Lab Test 09/13/18  09/10/18   0750  09/09/18   0601   WBC   --   11.2*  11.6*   RBC   --   3.48*  3.40*   HGB  11.8*  11.2*  11.0*   HCT   --   33.2*  32.5*   MCV   --   95  96   MCH   --   32.2  32.4   MCHC   --   33.7  33.8   RDW   --   14.9  14.8   PLT   --   294  263       Last Basic Metabolic Panel:  Recent Labs   Lab Test 09/13/18  09/10/18   0750   NA  139  141   POTASSIUM  4.1  4.3   CHLORIDE  108  114*   ULISSES  8.7  7.4*   CO2  19*  19*   BUN  <10  7   CR  0.79  0.69   GLC  78  95       Liver Function Studies -   Recent Labs   Lab Test 09/13/18  09/10/18   0750   PROTTOTAL  7.1  6.9   ALBUMIN  2.9*  2.5*   BILITOTAL  1.0  0.9   ALKPHOS  279*  424*   AST  66*  159*   ALT  96*  183*       TSH   Date Value Ref Range Status   09/06/2018 2.05 0.40 - 4.00 mU/L Final   05/30/2018 3.66 0.40 - 4.00 mU/L Final        Assessment/Plan:  Collagenous colitis  Acute diarrhea  Acute kidney injury (H)  Diarrhea resolved. Meds as above. Monitor renal function weekly and F/U next week.     Physical deconditioning  Acute on chronic. Therapies. F/U with progress next week.     Cancer of vocal cord (H)  Dysphagia, unspecified type  Esophageal abnormality  Chronic issues. ENT f/u as outpatient. Add PPI due to esophageal inflammation.     Transaminitis  Improving LFTs. Monitor.     Atrial fibrillation and flutter (H)  Chronic, rate controlled. Eliquis as ordered.     Orders:  1. Per MD recommendations start Prilosec 20 mg PO daily diagnosis esophageal inflammation    Electronically signed by  FABI Hernandez CNP

## 2018-09-19 ENCOUNTER — ONCOLOGY VISIT (OUTPATIENT)
Dept: ONCOLOGY | Facility: CLINIC | Age: 73
End: 2018-09-19
Attending: INTERNAL MEDICINE
Payer: MEDICARE

## 2018-09-19 ENCOUNTER — MEDICAL CORRESPONDENCE (OUTPATIENT)
Dept: HEALTH INFORMATION MANAGEMENT | Facility: CLINIC | Age: 73
End: 2018-09-19

## 2018-09-19 ENCOUNTER — HOSPITAL ENCOUNTER (OUTPATIENT)
Facility: CLINIC | Age: 73
Setting detail: SPECIMEN
Discharge: HOME | End: 2018-09-19
Attending: INTERNAL MEDICINE | Admitting: INTERNAL MEDICINE
Payer: MEDICARE

## 2018-09-19 VITALS
SYSTOLIC BLOOD PRESSURE: 120 MMHG | OXYGEN SATURATION: 96 % | BODY MASS INDEX: 28.78 KG/M2 | DIASTOLIC BLOOD PRESSURE: 71 MMHG | TEMPERATURE: 96.7 F | HEART RATE: 69 BPM | RESPIRATION RATE: 18 BRPM | WEIGHT: 189.3 LBS

## 2018-09-19 DIAGNOSIS — D47.2 MGUS (MONOCLONAL GAMMOPATHY OF UNKNOWN SIGNIFICANCE): ICD-10-CM

## 2018-09-19 LAB
ALBUMIN SERPL-MCNC: 3 G/DL (ref 3.4–5)
ALP SERPL-CCNC: 173 U/L (ref 40–150)
ALT SERPL W P-5'-P-CCNC: 57 U/L (ref 0–70)
ANION GAP SERPL CALCULATED.3IONS-SCNC: 4 MMOL/L (ref 3–14)
AST SERPL W P-5'-P-CCNC: 33 U/L (ref 0–45)
BASOPHILS # BLD AUTO: 0.1 10E9/L (ref 0–0.2)
BASOPHILS NFR BLD AUTO: 0.8 %
BILIRUB SERPL-MCNC: 0.7 MG/DL (ref 0.2–1.3)
BUN SERPL-MCNC: 10 MG/DL (ref 7–30)
CALCIUM SERPL-MCNC: 8.5 MG/DL (ref 8.5–10.1)
CHLORIDE SERPL-SCNC: 107 MMOL/L (ref 94–109)
CO2 SERPL-SCNC: 31 MMOL/L (ref 20–32)
CREAT SERPL-MCNC: 0.88 MG/DL (ref 0.66–1.25)
DIFFERENTIAL METHOD BLD: ABNORMAL
EOSINOPHIL # BLD AUTO: 0.3 10E9/L (ref 0–0.7)
EOSINOPHIL NFR BLD AUTO: 1.8 %
ERYTHROCYTE [DISTWIDTH] IN BLOOD BY AUTOMATED COUNT: 15.3 % (ref 10–15)
GFR SERPL CREATININE-BSD FRML MDRD: 84 ML/MIN/1.7M2
GLUCOSE SERPL-MCNC: 82 MG/DL (ref 70–99)
HCT VFR BLD AUTO: 36.8 % (ref 40–53)
HGB BLD-MCNC: 11.9 G/DL (ref 13.3–17.7)
IMM GRANULOCYTES # BLD: 0.1 10E9/L (ref 0–0.4)
IMM GRANULOCYTES NFR BLD: 0.7 %
LYMPHOCYTES # BLD AUTO: 2.4 10E9/L (ref 0.8–5.3)
LYMPHOCYTES NFR BLD AUTO: 17.5 %
MCH RBC QN AUTO: 32 PG (ref 26.5–33)
MCHC RBC AUTO-ENTMCNC: 32.3 G/DL (ref 31.5–36.5)
MCV RBC AUTO: 99 FL (ref 78–100)
MONOCYTES # BLD AUTO: 1 10E9/L (ref 0–1.3)
MONOCYTES NFR BLD AUTO: 6.9 %
NEUTROPHILS # BLD AUTO: 9.9 10E9/L (ref 1.6–8.3)
NEUTROPHILS NFR BLD AUTO: 72.3 %
NRBC # BLD AUTO: 0 10*3/UL
NRBC BLD AUTO-RTO: 0 /100
PLATELET # BLD AUTO: 367 10E9/L (ref 150–450)
POTASSIUM SERPL-SCNC: 4.6 MMOL/L (ref 3.4–5.3)
PROT SERPL-MCNC: 7.1 G/DL (ref 6.8–8.8)
RBC # BLD AUTO: 3.72 10E12/L (ref 4.4–5.9)
SODIUM SERPL-SCNC: 142 MMOL/L (ref 133–144)
VIT B12 SERPL-MCNC: 846 PG/ML (ref 193–986)
WBC # BLD AUTO: 13.7 10E9/L (ref 4–11)

## 2018-09-19 PROCEDURE — 80053 COMPREHEN METABOLIC PANEL: CPT | Performed by: INTERNAL MEDICINE

## 2018-09-19 PROCEDURE — 82607 VITAMIN B-12: CPT | Performed by: INTERNAL MEDICINE

## 2018-09-19 PROCEDURE — 82784 ASSAY IGA/IGD/IGG/IGM EACH: CPT | Performed by: INTERNAL MEDICINE

## 2018-09-19 PROCEDURE — 83883 ASSAY NEPHELOMETRY NOT SPEC: CPT | Performed by: INTERNAL MEDICINE

## 2018-09-19 PROCEDURE — 36415 COLL VENOUS BLD VENIPUNCTURE: CPT

## 2018-09-19 PROCEDURE — 86334 IMMUNOFIX E-PHORESIS SERUM: CPT | Performed by: INTERNAL MEDICINE

## 2018-09-19 PROCEDURE — 85025 COMPLETE CBC W/AUTO DIFF WBC: CPT | Performed by: INTERNAL MEDICINE

## 2018-09-19 PROCEDURE — 00000402 ZZHCL STATISTIC TOTAL PROTEIN: Performed by: INTERNAL MEDICINE

## 2018-09-19 PROCEDURE — 84165 PROTEIN E-PHORESIS SERUM: CPT | Performed by: INTERNAL MEDICINE

## 2018-09-19 NOTE — MR AVS SNAPSHOT
After Visit Summary   9/19/2018    Efrem Ramos    MRN: [id number]           Patient Information     Date Of Birth          1945        Visit Information        Provider Department      9/19/2018 1:15 PM Nurse,  Oncology [facility]        Today's Diagnoses     MGUS (monoclonal gammopathy of unknown significance)           Follow-ups after your visit        Your next 10 appointments already scheduled     Sep 19, 2018  1:15 PM CDT   Return Visit with  Oncology Nurse   [facility]    [facility]  [address]  [facility] [address]   [phone]            Sep 26, 2018  1:00 PM CDT   Return Visit with Shae Aldana MD   [facility]    [facility]  [address]  [facility] [address]   [phone]            Aug 14, 2019  1:30 PM CDT   Return Sleep Patient with Ron Pacheco MD   [facility] ([facility]    [address]  [facility] [address] [phone]              Who to contact     If you have questions or need follow up information about today's clinic visit or your schedule please contact [facility] directly at [phone].  Normal or non-critical lab and imaging results will be communicated to you by MyChart, letter or phone within 4 business days after the clinic has received the results. If you do not hear from us within 7 days, please contact the clinic through [employer] or phone. If you have a critical or abnormal lab result, we will notify you by phone as soon as possible.  Submit refill requests through [employer] or call your pharmacy and they will forward the refill request to us. Please allow 3 business days for your refill to be completed.          Additional Information About Your Visit        [employer] Information     [employer] gives you secure access to your  electronic health record. If you see a primary care provider, you can also send messages to your care team and make appointments. If you have questions, please call your primary care clinic.  If you do not have a primary care provider, please call [phone] and they will assist you.        Care EveryWhere ID     This is your Care EveryWhere ID. This could be used by other organizations to access your [place] medical records  [id number]         Blood Pressure from Last 3 Encounters:   09/17/18 126/79   09/10/18 116/71   09/10/18 133/90    Weight from Last 3 Encounters:   09/17/18 87.7 kg (193 lb 4.8 oz)   09/10/18 87.4 kg (192 lb 11.2 oz)   09/10/18 84.5 kg (186 lb 3.2 oz)              We Performed the Following     CBC with platelets differential     Comprehensive metabolic panel     Kappa and lambda light chain     Protein electrophoresis     Protein Immunofixation Serum     Vitamin B12          Today's Medication Changes          These changes are accurate as of 9/19/18 12:57 PM.  If you have any questions, ask your nurse or doctor.               These medicines have changed or have updated prescriptions.        Dose/Directions    metoprolol succinate 25 MG 24 hr tablet   Commonly known as:  TOPROL-XL   This may have changed:  when to take this   Used for:  Nonrheumatic aortic valve stenosis        Dose:  12.5 mg   Take 0.5 tablets (12.5 mg) by mouth daily   Quantity:  45 tablet   Refills:  2                Primary Care Provider Office Phone # Fax #    Danny Patric Paige [phone] [address]  [facility] [address]        Equal Access to Services     MONA [facility] SystemJULIA : Hadii angle herringo Sochetan, waaxda luqadaha, qaybta kaalmada alfonzo merino. So [facility] [phone].    ATENCIÓN: Si habla español, tiene a palomo disposición servicios gratuitos de asistencia lingüística. Llame al [phone].    We comply with applicable federal civil  rights laws and [place] laws. We do not discriminate on the basis of race, color, national origin, age, disability, sex, sexual orientation, or gender identity.            Thank you!     Thank you for choosing [facility]  for your care. Our goal is always to provide you with excellent care. Hearing back from our patients is one way we can continue to improve our services. Please take a few minutes to complete the written survey that you may receive in the mail after your visit with us. Thank you!             Your Updated Medication List - Protect others around you: Learn how to safely use, store and throw away your medicines at www.disposemymeds.org.          This list is accurate as of 9/19/18 12:57 PM.  Always use your most recent med list.                   Brand Name Dispense Instructions for use Diagnosis    apixaban ANTICOAGULANT 5 MG tablet    ELIQUIS    180 tablet    Take 1 tablet (5 mg) by mouth 2 times daily    Atrial fibrillation and flutter (H)       ASPIRIN PO      Take 81 mg by mouth every evening        budesonide 9 MG 24 hr tablet    UCERIS    30 tablet    Take 1 tablet (9 mg) by mouth daily    Collagenous colitis       Cyanocobalamin 2000 MCG Tabs      Take 2,000 mcg by mouth every 7 days On Sundays        gabapentin 300 MG capsule    NEURONTIN    60 capsule    Take 2 capsules (600 mg) by mouth every evening    Restless legs syndrome (RLS)       loperamide 2 MG capsule    IMODIUM    20 capsule    Take 2 capsules (4 mg) by mouth 4 times daily as needed for diarrhea    Diarrhea, unspecified type       metoprolol succinate 25 MG 24 hr tablet    TOPROL-XL    45 tablet    Take 0.5 tablets (12.5 mg) by mouth daily    Nonrheumatic aortic valve stenosis       multivitamin Tabs tablet      Take 1 tablet by mouth 2 times daily        PRILOSEC PO      Take 20 mg by mouth every morning        simethicone 80 MG chewable tablet    MYLICON    180 tablet    Take 1 tablet (80 mg) by mouth every 6  hours as needed for cramping    Flatulence, eructation and gas pain

## 2018-09-19 NOTE — LETTER
9/19/2018         RE: Efrem Ramos  [address]  [facility] [address]        Dear Colleague,    Thank you for referring your patient, Efrem Ramos, to the [facility]. Please see a copy of my visit note below.    Medical Assistant Note:  Efrem Ramos presents today for yes.    Patient seen by provider today: no.   present during visit today: Not Applicable.    Concerns: No Concerns.    Procedure:  Lab draw site: RAC, Needle type: BF, Gauge: 21. Gauze and coban applied    Post Assessment:  Labs drawn without difficulty: Yes.    Discharge Plan:  Departure Mode: Ambulatory.    Face to Face Time: 5.    Lala Sanchez MA              Again, thank you for allowing me to participate in the care of your patient.        Sincerely,        Oncology Nurse

## 2018-09-19 NOTE — PROGRESS NOTES
Medical Assistant Note:  Efrem Ramos presents today for yes.    Patient seen by provider today: no.   present during visit today: Not Applicable.    Concerns: No Concerns.    Procedure:  Lab draw site: RAC, Needle type: BF, Gauge: 21. Gauze and coban applied    Post Assessment:  Labs drawn without difficulty: Yes.    Discharge Plan:  Departure Mode: Ambulatory.    Face to Face Time: 5.    Lala Sanchez MA

## 2018-09-20 ENCOUNTER — DISCHARGE SUMMARY NURSING HOME (OUTPATIENT)
Dept: GERIATRICS | Facility: CLINIC | Age: 73
End: 2018-09-20
Payer: MEDICARE

## 2018-09-20 DIAGNOSIS — R13.10 DYSPHAGIA, UNSPECIFIED TYPE: ICD-10-CM

## 2018-09-20 DIAGNOSIS — I48.92 ATRIAL FIBRILLATION AND FLUTTER (H): ICD-10-CM

## 2018-09-20 DIAGNOSIS — R53.81 PHYSICAL DECONDITIONING: ICD-10-CM

## 2018-09-20 DIAGNOSIS — R74.01 TRANSAMINITIS: ICD-10-CM

## 2018-09-20 DIAGNOSIS — I48.91 ATRIAL FIBRILLATION AND FLUTTER (H): ICD-10-CM

## 2018-09-20 DIAGNOSIS — K52.831 COLLAGENOUS COLITIS: Primary | ICD-10-CM

## 2018-09-20 DIAGNOSIS — R19.7 ACUTE DIARRHEA: ICD-10-CM

## 2018-09-20 DIAGNOSIS — N17.9 ACUTE KIDNEY INJURY (H): ICD-10-CM

## 2018-09-20 DIAGNOSIS — C32.0 CANCER OF VOCAL CORD (H): ICD-10-CM

## 2018-09-20 LAB
ALBUMIN SERPL ELPH-MCNC: 3.4 G/DL (ref 3.7–5.1)
ALPHA1 GLOB SERPL ELPH-MCNC: 0.3 G/DL (ref 0.2–0.4)
ALPHA2 GLOB SERPL ELPH-MCNC: 0.9 G/DL (ref 0.5–0.9)
B-GLOBULIN SERPL ELPH-MCNC: 0.7 G/DL (ref 0.6–1)
GAMMA GLOB SERPL ELPH-MCNC: 1.7 G/DL (ref 0.7–1.6)
IGA SERPL-MCNC: 435 MG/DL (ref 70–380)
IGG SERPL-MCNC: 1590 MG/DL (ref 695–1620)
IGM SERPL-MCNC: 255 MG/DL (ref 60–265)
KAPPA LC UR-MCNC: 4.5 MG/DL (ref 0.33–1.94)
KAPPA LC/LAMBDA SER: 1.69 {RATIO} (ref 0.26–1.65)
LAMBDA LC SERPL-MCNC: 2.67 MG/DL (ref 0.57–2.63)
M PROTEIN SERPL ELPH-MCNC: 0.4 G/DL
PROT PATTERN SERPL ELPH-IMP: ABNORMAL
PROT PATTERN SERPL IFE-IMP: ABNORMAL

## 2018-09-20 PROCEDURE — 99316 NF DSCHRG MGMT 30 MIN+: CPT | Performed by: NURSE PRACTITIONER

## 2018-09-20 NOTE — LETTER
9/20/2018        RE: Efrem Ramos  [address]  [place] [address]          [place] [facility] DISCHARGE SUMMARY    PATIENT'S NAME: Efrem Ramos  YOB: 1945  MEDICAL RECORD NUMBER:  [id number]    PRIMARY CARE PROVIDER AND CLINIC RESPONSIBLE AFTER TRANSFER: Danny Paige [address] / [place] [address]     CODE STATUS/ADVANCE DIRECTIVES DISCUSSION:   CPR/Full code        Allergies   Allergen Reactions     Adhesive Tape Blisters     Amiodarone      Lasix [Furosemide] Other (See Comments)     Throat swelling, wheezing     Penicillins Unknown     Sulfa Drugs Difficulty breathing       TRANSFERRING PROVIDERS: Tonya Lynn Haase, FABI CNP, Dr. John MD  DATE OF SNF ADMISSION:  September / 10 / 2018  DATE OF SNF (anticipated) DISCHARGE: September / 23 / 2018  DISCHARGE DISPOSITION: FMG Provider   Nursing Facility: [facility] stay 9/5/18 to 9/10/18.     Condition on Discharge:  Stable.  Function:  Walking up to 200 feet using a cane or RW indep  Cognitive Scores: BIMS: 15/15, SBT: 6/28    Equipment: RW and cane interchageably    DISCHARGE DIAGNOSIS:   1. Collagenous colitis    2. Acute diarrhea    3. Acute kidney injury (H)    4. Physical deconditioning    5. Cancer of vocal cord (H)    6. Dysphagia, unspecified type    7. Transaminitis    8. Atrial fibrillation and flutter (H)            PAST MEDICAL HISTORY:  has a past medical history of Atrial fibrillation (H); Cancer (H) (vocal cord); Carpal tunnel syndrome; CVA (cerebral infarction) (5/5/2015); Demyelinating disease of central nervous system, unspecified; Dyspnea; ENCEPHALOPATHY UNSPECIFIED  (3/15/2005); Mixed hyperlipidemia (3/15/2005); Other and unspecified noninfectious gastroenteritis and colitis(558.9); Pneumonia (8/17/2016); Redundant colon; S/P coronary angiogram (09/05/2017); Shingles; SKIN DISORDERS NEC (3/15/2005); and Sleep apnea. He also has no  past medical history of Arthritis; Asthma; Congestive heart failure, unspecified; Diabetes mellitus (H); Hypertension; or Thyroid disease.    DISCHARGE MEDICATIONS:  Current Outpatient Prescriptions   Medication Sig Dispense Refill     apixaban ANTICOAGULANT (ELIQUIS) 5 MG tablet Take 1 tablet (5 mg) by mouth 2 times daily 180 tablet 2     ASPIRIN PO Take 81 mg by mouth every evening        budesonide (UCERIS) 9 MG 24 hr tablet Take 1 tablet (9 mg) by mouth daily 30 tablet 1     Cyanocobalamin 2000 MCG TABS Take 2,000 mcg by mouth every 7 days On Sundays       gabapentin (NEURONTIN) 300 MG capsule Take 2 capsules (600 mg) by mouth every evening 60 capsule 5     loperamide (IMODIUM) 2 MG capsule Take 2 capsules (4 mg) by mouth 4 times daily as needed for diarrhea 20 capsule      metoprolol succinate (TOPROL-XL) 25 MG 24 hr tablet Take 0.5 tablets (12.5 mg) by mouth daily (Patient taking differently: Take 12.5 mg by mouth every evening ) 45 tablet 2     multivitamin (OCUVITE) TABS Take 1 tablet by mouth 2 times daily        Omeprazole (PRILOSEC PO) Take 20 mg by mouth every morning       simethicone (MYLICON) 80 MG chewable tablet Take 1 tablet (80 mg) by mouth every 6 hours as needed for cramping 180 tablet        MEDICATION CHANGES/RATIONALE:   Started on prilosec 20mg every day due to esophageal inflammation  Patient having constipation during TCU stay, using miralax 17gm every day  Prn for management, patient states he has miralax at home  Last 3 BPs: 120/71, 132/81, 126/79 mmHg  HR Ranges: 64-73 bpm  Admission Weight: 9/10/18: 192.7 lbs  Current Weight: 9/19/18: 189.3 lbs  Controlled medications sent with patient:   not applicable/none     ROS:    10 point ROS of systems including Constitutional, Eyes, Respiratory, Cardiovascular, Gastroenterology, Genitourinary, Integumentary, Muscularskeletal, Psychiatric were all negative except for pertinent positives noted in my HPI.    Physical Exam:   Vitals: /71   Pulse 69  Temp 96.7  F (35.9  C)  Resp 18  Wt 189 lb 4.8 oz (85.9 kg)  SpO2 96%  BMI 28.78 kg/m2  BMI= Body mass index is 28.78 kg/(m^2).  GENERAL APPEARANCE:  Alert, in no distress  ENT:  Mouth and posterior oropharynx normal, moist mucous membranes, [place]  EYES:  EOM, conjunctivae, lids, pupils and irises normal, PERRL  RESP:  respiratory effort and palpation of chest normal, lungs clear to auscultation , no respiratory distress  CV:  Palpation and auscultation of heart done , regular rate and rhythm, no murmur, rub, or gallop, peripheral edema 1+ in LE bilaterally  ABDOMEN:  normal bowel sounds, soft, nontender, no hepatosplenomegaly or other masses  M/S:   Examination of:   right upper extremity, left upper extremity, right lower extremity and left lower extremity  Inspection, ROM, stability and muscle strength normal  SKIN:  Inspection of skin and subcutaneous tissue baseline  NEURO:   Cranial nerves 2-12 are normal tested and grossly at patient's baseline, speech WNL  PSYCH:  affect and mood normal    HPI Nursing Facility Course: Patient progressed in therapy to walking > 200 feet using a RW or a cane, he is indep with ADL's will discontinue home with OP PT.   HPI information obtained from: facility chart records, facility staff, patient report and [facility] chart review.      Collagenous colitis  Acute diarrhea  Acute kidney injury (H)  Physical deconditioning  Acute/ongoing: continue budesonide 9mg every day, imodium 2mg QID prn, simethicone 80mg q 6 hours prn   miralax 17gm every day prn     Cancer of vocal cord (H)  Dysphagia, unspecified type  Acute/ongoing: biopsy done with EGD 9/8/18 results pending  F/u oncology 9/26/18  Started on prilosec 20mg every day due to esophageal irritation     Transaminitis  Acute: f/u as OP with GI     Atrial fibrillation and flutter (H)  Chronic:  continue toprol xl 12.5mg every day, apixaban 5mg BID, ASA 81mg QD    DISCHARGE PLAN:  Outpatient Physical  Therapy  Patient instructed to follow-up with:  PCP in 5-7 days       Current [place] scheduled appointments:  Future Appointments  Date Time Provider Department Center   9/26/2018 1:00 PM [facility]   8/14/2019 1:30 PM Ron Pacheco MD [facility]       MTM referral needed and placed by this provider: No    Pending labs: none  SNF labs   CBC RESULTS:   Recent Labs   Lab Test  09/19/18   1254 09/13/18  09/10/18   0750   WBC  13.7*   --   11.2*   RBC  3.72*   --   3.48*   HGB  11.9*  11.8*  11.2*   HCT  36.8*   --   33.2*   MCV  99   --   95   MCH  32.0   --   32.2   MCHC  32.3   --   33.7   RDW  15.3*   --   14.9   PLT  367   --   294       Last Basic Metabolic Panel:  Recent Labs   Lab Test  09/19/18   1254 09/17/18   NA  142  140   POTASSIUM  4.6  4.4   CHLORIDE  107  105   ULISSES  8.5  8.9   CO2  31  29   BUN  10  <10   CR  0.88  0.80   GLC  82  79       Liver Function Studies -   Recent Labs   Lab Test  09/19/18   1254 09/13/18   PROTTOTAL  7.1  7.1   ALBUMIN  3.0*  2.9*   BILITOTAL  0.7  1.0   ALKPHOS  173*  279*   AST  33  66*   ALT  57  96*       TSH   Date Value Ref Range Status   09/06/2018 2.05 0.40 - 4.00 mU/L Final   05/30/2018 3.66 0.40 - 4.00 mU/L Final         Discharge Treatments:none    TOTAL DISCHARGE TIME:   Greater than 30 minutes  Electronically signed by:  Tonya Lynn Haase, APRN CNP      Sincerely,        Tonya Lynn Haase, APRN CNP

## 2018-09-20 NOTE — PROGRESS NOTES
[place] [facility] DISCHARGE SUMMARY    PATIENT'S NAME: Efrem Ramos  YOB: 1945  MEDICAL RECORD NUMBER:  [id number]    PRIMARY CARE PROVIDER AND CLINIC RESPONSIBLE AFTER TRANSFER: Danny Paige [address] / [place] [address]     CODE STATUS/ADVANCE DIRECTIVES DISCUSSION:   CPR/Full code        Allergies   Allergen Reactions     Adhesive Tape Blisters     Amiodarone      Lasix [Furosemide] Other (See Comments)     Throat swelling, wheezing     Penicillins Unknown     Sulfa Drugs Difficulty breathing       TRANSFERRING PROVIDERS: Tonya Lynn Haase, APRN CNP, Dr. John MD  DATE OF SNF ADMISSION:  September / 10 / 2018  DATE OF SNF (anticipated) DISCHARGE: September / 23 / 2018  DISCHARGE DISPOSITION: FM Provider   Nursing Facility: [facility] stay 9/5/18 to 9/10/18.     Condition on Discharge:  Stable.  Function:  Walking up to 200 feet using a cane or RW indep  Cognitive Scores: BIMS: 15/15, SBT: 6/28    Equipment: RW and cane interchageably    DISCHARGE DIAGNOSIS:   1. Collagenous colitis    2. Acute diarrhea    3. Acute kidney injury (H)    4. Physical deconditioning    5. Cancer of vocal cord (H)    6. Dysphagia, unspecified type    7. Transaminitis    8. Atrial fibrillation and flutter (H)            PAST MEDICAL HISTORY:  has a past medical history of Atrial fibrillation (H); Cancer (H) (vocal cord); Carpal tunnel syndrome; CVA (cerebral infarction) (5/5/2015); Demyelinating disease of central nervous system, unspecified; Dyspnea; ENCEPHALOPATHY UNSPECIFIED  (3/15/2005); Mixed hyperlipidemia (3/15/2005); Other and unspecified noninfectious gastroenteritis and colitis(558.9); Pneumonia (8/17/2016); Redundant colon; S/P coronary angiogram (09/05/2017); Shingles; SKIN DISORDERS NEC (3/15/2005); and Sleep apnea. He also has no past medical history of Arthritis; Asthma; Congestive heart failure, unspecified; Diabetes  mellitus (H); Hypertension; or Thyroid disease.    DISCHARGE MEDICATIONS:  Current Outpatient Prescriptions   Medication Sig Dispense Refill     apixaban ANTICOAGULANT (ELIQUIS) 5 MG tablet Take 1 tablet (5 mg) by mouth 2 times daily 180 tablet 2     ASPIRIN PO Take 81 mg by mouth every evening        budesonide (UCERIS) 9 MG 24 hr tablet Take 1 tablet (9 mg) by mouth daily 30 tablet 1     Cyanocobalamin 2000 MCG TABS Take 2,000 mcg by mouth every 7 days On Sundays       gabapentin (NEURONTIN) 300 MG capsule Take 2 capsules (600 mg) by mouth every evening 60 capsule 5     loperamide (IMODIUM) 2 MG capsule Take 2 capsules (4 mg) by mouth 4 times daily as needed for diarrhea 20 capsule      metoprolol succinate (TOPROL-XL) 25 MG 24 hr tablet Take 0.5 tablets (12.5 mg) by mouth daily (Patient taking differently: Take 12.5 mg by mouth every evening ) 45 tablet 2     multivitamin (OCUVITE) TABS Take 1 tablet by mouth 2 times daily        Omeprazole (PRILOSEC PO) Take 20 mg by mouth every morning       simethicone (MYLICON) 80 MG chewable tablet Take 1 tablet (80 mg) by mouth every 6 hours as needed for cramping 180 tablet        MEDICATION CHANGES/RATIONALE:   Started on prilosec 20mg every day due to esophageal inflammation  Patient having constipation during TCU stay, using miralax 17gm every day  Prn for management, patient states he has miralax at home  Last 3 BPs: 120/71, 132/81, 126/79 mmHg  HR Ranges: 64-73 bpm  Admission Weight: 9/10/18: 192.7 lbs  Current Weight: 9/19/18: 189.3 lbs  Controlled medications sent with patient:   not applicable/none     ROS:    10 point ROS of systems including Constitutional, Eyes, Respiratory, Cardiovascular, Gastroenterology, Genitourinary, Integumentary, Muscularskeletal, Psychiatric were all negative except for pertinent positives noted in my HPI.    Physical Exam:   Vitals: /71  Pulse 69  Temp 96.7  F (35.9  C)  Resp 18  Wt 189 lb 4.8 oz (85.9 kg)  SpO2 96%  BMI  28.78 kg/m2  BMI= Body mass index is 28.78 kg/(m^2).  GENERAL APPEARANCE:  Alert, in no distress  ENT:  Mouth and posterior oropharynx normal, moist mucous membranes, [place]  EYES:  EOM, conjunctivae, lids, pupils and irises normal, PERRL  RESP:  respiratory effort and palpation of chest normal, lungs clear to auscultation , no respiratory distress  CV:  Palpation and auscultation of heart done , regular rate and rhythm, no murmur, rub, or gallop, peripheral edema 1+ in LE bilaterally  ABDOMEN:  normal bowel sounds, soft, nontender, no hepatosplenomegaly or other masses  M/S:   Examination of:   right upper extremity, left upper extremity, right lower extremity and left lower extremity  Inspection, ROM, stability and muscle strength normal  SKIN:  Inspection of skin and subcutaneous tissue baseline  NEURO:   Cranial nerves 2-12 are normal tested and grossly at patient's baseline, speech WNL  PSYCH:  affect and mood normal    HPI Nursing Facility Course: Patient progressed in therapy to walking > 200 feet using a RW or a cane, he is indep with ADL's will discontinue home with OP PT.   HPI information obtained from: facility chart records, facility staff, patient report and [facility] chart review.      Collagenous colitis  Acute diarrhea  Acute kidney injury (H)  Physical deconditioning  Acute/ongoing: continue budesonide 9mg every day, imodium 2mg QID prn, simethicone 80mg q 6 hours prn   miralax 17gm every day prn     Cancer of vocal cord (H)  Dysphagia, unspecified type  Acute/ongoing: biopsy done with EGD 9/8/18 results pending  F/u oncology 9/26/18  Started on prilosec 20mg every day due to esophageal irritation     Transaminitis  Acute: f/u as OP with GI     Atrial fibrillation and flutter (H)  Chronic:  continue toprol xl 12.5mg every day, apixaban 5mg BID, ASA 81mg QD    DISCHARGE PLAN:  Outpatient Physical Therapy  Patient instructed to follow-up with:  PCP in 5-7 days       Current [place] scheduled  appointments:  Future Appointments  Date Time Provider Department Center   9/26/2018 1:00 PM Shae Aldana MD [facility] [place]   8/14/2019 1:30 PM Ron Pacheco MD [facility]       MTM referral needed and placed by this provider: No    Pending labs: none  SNF labs   CBC RESULTS:   Recent Labs   Lab Test  09/19/18   1254 09/13/18  09/10/18   0750   WBC  13.7*   --   11.2*   RBC  3.72*   --   3.48*   HGB  11.9*  11.8*  11.2*   HCT  36.8*   --   33.2*   MCV  99   --   95   MCH  32.0   --   32.2   MCHC  32.3   --   33.7   RDW  15.3*   --   14.9   PLT  367   --   294       Last Basic Metabolic Panel:  Recent Labs   Lab Test  09/19/18   1254 09/17/18   NA  142  140   POTASSIUM  4.6  4.4   CHLORIDE  107  105   ULISSES  8.5  8.9   CO2  31  29   BUN  10  <10   CR  0.88  0.80   GLC  82  79       Liver Function Studies -   Recent Labs   Lab Test  09/19/18   1254 09/13/18   PROTTOTAL  7.1  7.1   ALBUMIN  3.0*  2.9*   BILITOTAL  0.7  1.0   ALKPHOS  173*  279*   AST  33  66*   ALT  57  96*       TSH   Date Value Ref Range Status   09/06/2018 2.05 0.40 - 4.00 mU/L Final   05/30/2018 3.66 0.40 - 4.00 mU/L Final         Discharge Treatments:none    TOTAL DISCHARGE TIME:   Greater than 30 minutes  Electronically signed by:  Tonya Lynn Haase, APRN CNP

## 2018-09-21 ENCOUNTER — TELEPHONE (OUTPATIENT)
Dept: FAMILY MEDICINE | Facility: CLINIC | Age: 73
End: 2018-09-21

## 2018-09-21 DIAGNOSIS — K52.831 COLLAGENOUS COLITIS: ICD-10-CM

## 2018-09-21 NOTE — TELEPHONE ENCOUNTER
Mariajose notified. Rx change called into [facility] [address] as requested.

## 2018-09-21 NOTE — TELEPHONE ENCOUNTER
Reason for Call:  Other returning call    Detailed comments: wife Mariajose returning call.  Please  Call her cell phone.     Phone Number Patient can be reached at: Cell number on file:    Telephone Information:   Mobile [phone]       Best Time: any    Can we leave a detailed message on this number? YES    Call taken on 9/21/2018 at 4:37 PM by Herlinda Sadler

## 2018-09-26 ENCOUNTER — HOSPITAL ENCOUNTER (OUTPATIENT)
Facility: CLINIC | Age: 73
Setting detail: SPECIMEN
End: 2018-09-26
Attending: INTERNAL MEDICINE
Payer: MEDICARE

## 2018-09-27 ENCOUNTER — ONCOLOGY VISIT (OUTPATIENT)
Dept: ONCOLOGY | Facility: CLINIC | Age: 73
End: 2018-09-27
Attending: INTERNAL MEDICINE
Payer: MEDICARE

## 2018-09-27 VITALS
TEMPERATURE: 97.7 F | WEIGHT: 180.2 LBS | HEART RATE: 65 BPM | OXYGEN SATURATION: 100 % | DIASTOLIC BLOOD PRESSURE: 80 MMHG | BODY MASS INDEX: 27.4 KG/M2 | SYSTOLIC BLOOD PRESSURE: 124 MMHG | RESPIRATION RATE: 16 BRPM

## 2018-09-27 DIAGNOSIS — D47.2 MGUS (MONOCLONAL GAMMOPATHY OF UNKNOWN SIGNIFICANCE): Primary | ICD-10-CM

## 2018-09-27 PROCEDURE — 99214 OFFICE O/P EST MOD 30 MIN: CPT | Performed by: INTERNAL MEDICINE

## 2018-09-27 PROCEDURE — G0463 HOSPITAL OUTPT CLINIC VISIT: HCPCS

## 2018-09-27 RX ORDER — ATORVASTATIN CALCIUM 40 MG/1
40 TABLET, FILM COATED ORAL DAILY
Refills: 3 | COMMUNITY
Start: 2018-07-23 | End: 2018-10-25

## 2018-09-27 ASSESSMENT — PAIN SCALES - GENERAL: PAINLEVEL: MODERATE PAIN (4)

## 2018-09-27 NOTE — MR AVS SNAPSHOT
After Visit Summary   9/27/2018    Efrem Ramos    MRN: [id number]           Patient Information     Date Of Birth          1945        Visit Information        Provider Department      9/27/2018 1:00 PM Shae Aldana MD [facility]        Today's Diagnoses     MGUS (monoclonal gammopathy of unknown significance)    -  1      Care Instructions    1. Labs before next visit- CBC diff, CMP, IFXN, SPEP, light chain, B12.   2.  RTC MD 3-4 months  3- Continue oral B12 2000 mcg once a week  4- Skeletal survey in 1-2 weeks              Follow-ups after your visit        Your next 10 appointments already scheduled     Oct 01, 2018  2:00 PM CDT   [facility] MD   [facility]    [address] [id number] [phone]           Bring a current list of meds and any records pertaining to this visit. For Physicals, please bring immunization records and any forms needing to be filled out. Please arrive 10 minutes early to complete paperwork.            Oct 04, 2018  1:00 PM CDT   XR BONE SURVEY COMPLETE with SHXR1   [facility] ([facility])    [address] [facility] [id number] [phone]           How do I prepare for my exam? (Food and drink instructions) No Food and Drink Restrictions.  How do I prepare for my exam? (Other instructions) You do not need to do anything special for this exam.  What should I wear: Wear comfortable clothes.  How long does the exam take: Most scans take less than 5 minutes.  What should I bring: Bring a list of your medicines, including vitamins, minerals and over-the-counter drugs. It is safest to leave personal items at home.  Do I need a :  No  is needed.  What do I need to tell my doctor: Tell your doctor if there s any chance you are pregnant.  What should I do after the exam: No restrictions, You may resume normal  activities.  What is this test: An image of a specific body part shown in shades of black and white.  Who should I call with questions: If you have any questions, please call the Imaging Department where you will have your exam. Directions, parking instructions, and other information is available on our website, [email or web address].            Dec 27, 2018  1:30 PM CST   Return Visit with  Oncology Nurse   [facility])    [facility]  [address]  [place] [address]   [phone]            Jan 03, 2019  2:00 PM CST   Return Visit with Shae Aldana MD   [facility])    [facility] [place]  [address]  [place] [address]   [phone]            Aug 14, 2019  1:30 PM CDT   Return Sleep Patient with Ron Pacheco MD   [facility] [place] ([facility] [place])    [address]  [place] [address]   [phone]              Future tests that were ordered for you today     Open Future Orders        Priority Expected Expires Ordered    XR Bone Survey Complete Routine 9/27/2018 9/27/2019 9/27/2018    Protein electrophoresis Routine 12/27/2018 6/27/2019 9/27/2018    Kappa and lambda light chain Routine 12/27/2018 6/27/2019 9/27/2018    Comprehensive metabolic panel Routine 12/27/2018 6/27/2019 9/27/2018    CBC with platelets differential Routine 12/27/2018 6/27/2019 9/27/2018    Protein Immunofixation Serum Routine 12/27/2018 6/27/2019 9/27/2018            Who to contact     If you have questions or need follow up information about today's clinic visit or your schedule please contact [facility] directly at [phone].  Normal or non-critical lab and imaging results will be communicated to you by MyChart, letter or phone within 4 business days after the clinic has received the results. If you do not hear from us within 7  days, please contact the clinic through Tomorrow or phone. If you have a critical or abnormal lab result, we will notify you by phone as soon as possible.  Submit refill requests through Tomorrow or call your pharmacy and they will forward the refill request to us. Please allow 3 business days for your refill to be completed.          Additional Information About Your Visit        Franchise [employer] Information     Tomorrow gives you secure access to your electronic health record. If you see a primary care provider, you can also send messages to your care team and make appointments. If you have questions, please call your primary care clinic.  If you do not have a primary care provider, please call [phone] and they will assist you.        Care EveryWhere ID     This is your Care EveryWhere ID. This could be used by other organizations to access your [facility] medical records  [id number]        Your Vitals Were     Pulse Temperature Respirations Pulse Oximetry BMI (Body Mass Index)       65 97.7  F (36.5  C) (Oral) 16 100% 27.4 kg/m2        Blood Pressure from Last 3 Encounters:   09/27/18 124/80   09/19/18 120/71   09/17/18 126/79    Weight from Last 3 Encounters:   09/27/18 81.7 kg (180 lb 3.2 oz)   09/19/18 85.9 kg (189 lb 4.8 oz)   09/17/18 87.7 kg (193 lb 4.8 oz)                 Today's Medication Changes          These changes are accurate as of 9/27/18  1:17 PM.  If you have any questions, ask your nurse or doctor.               These medicines have changed or have updated prescriptions.        Dose/Directions    metoprolol succinate 25 MG 24 hr tablet   Commonly known as:  TOPROL-XL   This may have changed:  when to take this   Used for:  Nonrheumatic aortic valve stenosis        Dose:  12.5 mg   Take 0.5 tablets (12.5 mg) by mouth daily   Quantity:  45 tablet   Refills:  2                Primary Care Provider Office Phone # Fax #    Danny Paige [phone] [address]  [facility] [address]        Equal Access to Services     [facility] Hospital[facility]ULIA : Hadii angle ku hadmeraryo Sojonasali, waaxda luqadaha, qaybta kaalmada angelique, alfonzo montielramyabrooke lynch. So [facility] [phone].    ATENCIÓN: Si habla español, tiene a palomo disposición servicios gratuitos de asistencia lingüística. Ummame al [phone].    We comply with applicable federal civil rights laws and [place] laws. We do not discriminate on the basis of race, color, national origin, age, disability, sex, sexual orientation, or gender identity.            Thank you!     Thank you for choosing [facility]  for your care. Our goal is always to provide you with excellent care. Hearing back from our patients is one way we can continue to improve our services. Please take a few minutes to complete the written survey that you may receive in the mail after your visit with us. Thank you!             Your Updated Medication List - Protect others around you: Learn how to safely use, store and throw away your medicines at www.disposemymeds.org.          This list is accurate as of 9/27/18  1:17 PM.  Always use your most recent med list.                   Brand Name Dispense Instructions for use Diagnosis    apixaban ANTICOAGULANT 5 MG tablet    ELIQUIS    180 tablet    Take 1 tablet (5 mg) by mouth 2 times daily    Atrial fibrillation and flutter (H)       ASPIRIN PO      Take 81 mg by mouth every evening        atorvastatin 40 MG tablet    LIPITOR      MGUS (monoclonal gammopathy of unknown significance)       budesonide 9 MG 24 hr tablet    UCERIS    30 tablet    Take 1 tablet (9 mg) by mouth daily    Collagenous colitis       Cyanocobalamin 2000 MCG Tabs      Take 2,000 mcg by mouth every 7 days On Sundays        gabapentin 300 MG capsule    NEURONTIN    60 capsule    Take 2 capsules (600 mg) by mouth every evening    Restless legs syndrome (RLS)       loperamide 2 MG capsule    IMODIUM    20 capsule    Take 2 capsules (4  mg) by mouth 4 times daily as needed for diarrhea    Diarrhea, unspecified type       metoprolol succinate 25 MG 24 hr tablet    TOPROL-XL    45 tablet    Take 0.5 tablets (12.5 mg) by mouth daily    Nonrheumatic aortic valve stenosis       multivitamin Tabs tablet      Take 1 tablet by mouth 2 times daily        PRILOSEC PO      Take 20 mg by mouth every morning        simethicone 80 MG chewable tablet    MYLICON    180 tablet    Take 1 tablet (80 mg) by mouth every 6 hours as needed for cramping    Flatulence, eructation and gas pain

## 2018-09-27 NOTE — PROGRESS NOTES
[facility] PHYSICIANS  HEMATOLOGY ONCOLOGY    HEMATOLOGY FOLLOWUP NOTE      DIAGNOSES:   1.  Monoclonal gammopathy of unknown significance.   2.  Anemia.   3.  History of Vocal cord cancer.      TREATMENT:     1. Vitamin B12 2000 mcg p.o. daily.      SUBJECTIVE:  Patient comes in for followup today. He was admitted to the hospitak with diarrhea and dehydration. He is slowly mara     REVIEW OF SYSTEMS:  A complete review of systems was performed and found to be negative other than pertinent positives mentioned in History of Present Illness.     Past medical, social histories reviewed.    Meds- Reviewed.     PHYSICAL EXAMINATION:   VITAL SIGNS:/80 (BP Location: Left arm, Patient Position: Chair, Cuff Size: Adult Regular)  Pulse 65  Temp 97.7  F (36.5  C) (Oral)  Resp 16  Wt 81.7 kg (180 lb 3.2 oz)  SpO2 100%  BMI 27.4 kg/m2  CONSTITUTIONAL: Appears tired.  HEENT: Pupils are equal. Oropharynx is clear.   NECK: No cervical or supraclavicular lymphadenopathy.   RESPIRATORY: Clear bilaterally.   CARD/VASC: S1, S2, regular.   GI: Soft, nontender, nondistended, no hepatosplenomegaly.   MUSKULOSKELETAL: Warm, well perfused.   NEUROLOGIC: Alert, awake.   INTEGUMENT: No rash.   LYMPHATICS: No edema.   PSYCH: Mood and affect was normal.     LABORATORY DATA AND IMAGING REVIEWED DURING THIS VISIT:  Recent Labs   Lab Test  09/19/18   1254 09/17/18   09/10/18   0750   09/08/18   1555   NA  142  140   < >  141   < >   --    POTASSIUM  4.6  4.4   < >  4.3   < >   --    CHLORIDE  107  105   < >  114*   < >   --    CO2  31  29   < >  19*   < >   --    ANIONGAP  4   --    --   8   < >   --    BUN  10  <10   < >  7   < >   --    CR  0.88  0.80   < >  0.69   < >   --    GLC  82  79   < >  95   < >   --    ULISSES  8.5  8.9   < >  7.4*   < >   --    MAG   --    --    --   1.8   --   2.8*    < > = values in this interval not displayed.     Recent Labs   Lab Test  09/19/18   1254 09/13/18  09/10/18   0750  09/09/18    0601   09/05/18   1628  03/21/18   1303   WBC  13.7*   --   11.2*  11.6*   < >  19.9*  11.6*   HGB  11.9*  11.8*  11.2*  11.0*   < >  14.4  13.0*   PLT  367   --   294  263   < >  366  293   MCV  99   --   95  96   < >  92  97   NEUTROPHIL  72.3   --    --    --    --   77.1  64.5    < > = values in this interval not displayed.     Recent Labs   Lab Test  09/19/18   1254 09/13/18  09/10/18   0750   09/16/13   1350  08/20/13   1509   BILITOTAL  0.7  1.0  0.9   < >  0.9  0.7   ALKPHOS  173*  279*  424*   < >  90  109   ALT  57  96*  183*   < >  28  28   AST  33  66*  159*   < >  30  28   ALBUMIN  3.0*  2.9*  2.5*   < >  4.1  3.8   LDH   --    --    --    --   453  434    < > = values in this interval not displayed.     Results for PHANI AVITIA (MRN [id number]) as of 9/27/2018 12:52   Ref. Range 3/21/2018 13:03 9/9/2018 06:01 9/19/2018 12:54   Gamma Fraction Latest Ref Range: 0.7 - 1.6 g/dL 1.8 (H) 1.6 1.7 (H)   IGA Latest Ref Range: 70 - 380 mg/dL 419 (H)  435 (H)   IGG Latest Ref Range: 695 - 1620 mg/dL 1660 (H)  1590   IGM Latest Ref Range: 60 - 265 mg/dL 234  255   Immunofixation ELP Unknown (Note)  (Note)   Kappa Free Lt Chain Latest Ref Range: 0.33 - 1.94 mg/dL 3.51 (H)  4.50 (H)   Kappa Lambda Ratio Latest Ref Range: 0.26 - 1.65  1.42  1.69 (H)   Lambda Free Lt Chain Latest Ref Range: 0.57 - 2.63 mg/dL 2.48  2.67 (H)   Mitochondrial M2 Antibody IgG Latest Ref Range: <4 U/mL  1    Monoclonal Peak Latest Ref Range: 0.0 g/dL 0.4 (H) 0.5 (H) 0.4 (H)       ECOG PS: 1    ASSESSMENT:   1- This is a 73-year-old gentleman with monoclonal gammopathy of unknown significance.    Bone marrow biopsy 6/8/17- normo cellular marrow with 0.3% monoclonal plasma cells. Normal flow. One (5%) of the 20 metaphase cells analyzed had a hyperdiploid karyotype   with gains of one extra copy each of chromosomes 5, 7, 9, 15, and 19, and loss of one copy each of chromosomes 13 and 22. 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     [facility]- M spike of 0.4 grams ,elevated kappa and lambda light chains and ratio. no end organ damage.   Recent admission due to worsening diarrhea. Leukocytosis is likely reactive.   We will monitor his labs for now. I will order a skeletal survey given back pain.    PLAN:     1. Labs before next visit- CBC diff, CMP, IFXN, SPEP, light chain, B12.   2.  RTC MD 3-4 months  3- Continue oral B12 2000 mcg once a week  4- Skeletal survey in 1-2 weeks  CASSIDY MAYO MD    9/27/2018    CC: Danny Paige MD

## 2018-09-27 NOTE — PATIENT INSTRUCTIONS
1. Labs before next visit- CBC diff, CMP, IFXN, SPEP, light chain, B12.   Scheduled/jp  2.  RTC MD 3-4 months   Scheduled/jp  3- Continue oral B12 2000 mcg once a week  4- Skeletal survey in 1-2 weeks  Scheduled/Jp    AVS given to patient/Jp

## 2018-09-27 NOTE — PROGRESS NOTES
"Oncology Rooming Note    September 27, 2018 12:57 PM   Efrem Ramos is a 73 year old male who presents for:    Chief Complaint   Patient presents with     Oncology Clinic Visit     Initial Vitals: /80 (BP Location: Left arm, Patient Position: Chair, Cuff Size: Adult Regular)  Pulse 65  Temp 97.7  F (36.5  C) (Oral)  Resp 16  Wt 81.7 kg (180 lb 3.2 oz)  SpO2 100%  BMI 27.4 kg/m2 Estimated body mass index is 27.4 kg/(m^2) as calculated from the following:    Height as of 9/5/18: 1.727 m (5' 8\").    Weight as of this encounter: 81.7 kg (180 lb 3.2 oz). Body surface area is 1.98 meters squared.  Moderate Pain (4) Comment: low back pain   No LMP for male patient.  Allergies reviewed: Yes  Medications reviewed: Yes    Medications: Medication refills not needed today.  Pharmacy name entered into I-MD:    [id number] - [facility] [address] AT [address] PHARMACY [facility], [place] - [address]  [facility], [place] - [address] AT [address] [place] [address]    Clinical concerns: None     5 minutes for nursing intake (face to face time)     Opal Samuel CMA              "

## 2018-10-01 ENCOUNTER — OFFICE VISIT (OUTPATIENT)
Dept: FAMILY MEDICINE | Facility: CLINIC | Age: 73
End: 2018-10-01
Payer: MEDICARE

## 2018-10-01 VITALS
SYSTOLIC BLOOD PRESSURE: 140 MMHG | WEIGHT: 178.1 LBS | HEIGHT: 68 IN | BODY MASS INDEX: 26.99 KG/M2 | OXYGEN SATURATION: 96 % | DIASTOLIC BLOOD PRESSURE: 86 MMHG | HEART RATE: 66 BPM | TEMPERATURE: 98.2 F

## 2018-10-01 DIAGNOSIS — G89.29 CHRONIC MIDLINE LOW BACK PAIN WITHOUT SCIATICA: ICD-10-CM

## 2018-10-01 DIAGNOSIS — R53.81 PHYSICAL DECONDITIONING: ICD-10-CM

## 2018-10-01 DIAGNOSIS — Z23 NEED FOR PROPHYLACTIC VACCINATION AND INOCULATION AGAINST INFLUENZA: ICD-10-CM

## 2018-10-01 DIAGNOSIS — Z12.11 SPECIAL SCREENING FOR MALIGNANT NEOPLASMS, COLON: Primary | ICD-10-CM

## 2018-10-01 DIAGNOSIS — Z12.11 SCREEN FOR COLON CANCER: ICD-10-CM

## 2018-10-01 DIAGNOSIS — K52.831 COLLAGENOUS COLITIS: ICD-10-CM

## 2018-10-01 DIAGNOSIS — M54.50 CHRONIC MIDLINE LOW BACK PAIN WITHOUT SCIATICA: ICD-10-CM

## 2018-10-01 PROCEDURE — 99214 OFFICE O/P EST MOD 30 MIN: CPT | Mod: 25 | Performed by: INTERNAL MEDICINE

## 2018-10-01 PROCEDURE — 36415 COLL VENOUS BLD VENIPUNCTURE: CPT | Performed by: INTERNAL MEDICINE

## 2018-10-01 PROCEDURE — G0008 ADMIN INFLUENZA VIRUS VAC: HCPCS | Performed by: INTERNAL MEDICINE

## 2018-10-01 PROCEDURE — 80048 BASIC METABOLIC PNL TOTAL CA: CPT | Performed by: INTERNAL MEDICINE

## 2018-10-01 PROCEDURE — 90662 IIV NO PRSV INCREASED AG IM: CPT | Performed by: INTERNAL MEDICINE

## 2018-10-01 RX ORDER — ACETAMINOPHEN 325 MG/1
325-650 TABLET ORAL EVERY 6 HOURS PRN
COMMUNITY
End: 2022-01-01

## 2018-10-01 NOTE — NURSING NOTE
Prior to injection verified patient identity using patient's name and date of birth.  Due to injection administration, patient instructed to remain in clinic for 15 minutes  afterwards, and to report any adverse reaction to me immediately.    Screening Questionnaire for Adult Immunization    Are you sick today?   No   Do you have allergies to medications, food, a vaccine component or latex?   No   Have you ever had a serious reaction after receiving a vaccination?   No   Do you have a long-term health problem with heart disease, lung disease, asthma, kidney disease, metabolic disease (e.g. diabetes), anemia, or other blood disorder?   No   Do you have cancer, leukemia, HIV/AIDS, or any other immune system problem?   No   In the past 3 months, have you taken medications that affect  your immune system, such as prednisone, other steroids, or anticancer drugs; drugs for the treatment of rheumatoid arthritis, Crohn s disease, or psoriasis; or have you had radiation treatments?   No   Have you had a seizure, or a brain or other nervous system problem?   No   During the past year, have you received a transfusion of blood or blood     products, or been given immune (gamma) globulin or antiviral drug?   No   For women: Are you pregnant or is there a chance you could become        pregnant during the next month?   No   Have you received any vaccinations in the past 4 weeks?   No     Immunization questionnaire answers were all negative.        Per orders of Dr. Paige, injection of Flu Shot given by Bria Clark. Patient instructed to remain in clinic for 15 minutes afterwards, and to report any adverse reaction to me immediately.       Screening performed by Bria Clark on 10/1/2018 at 3:03 PM.

## 2018-10-01 NOTE — PATIENT INSTRUCTIONS
(Z12.11) Special screening for malignant neoplasms, colon  (primary encounter diagnosis)  Comment: referral for CT colongraphy given [facility] phone #[phone]   Plan:     (Z12.11) Screen for colon cancer  Comment: We will check this as you are able  Plan: CT Colonography Screening without Contrast            (Z23) Need for prophylactic vaccination and inoculation against influenza  Comment: due for pneumonia vaccine   Plan:     (K52.831) Collagenous colitis  Comment: Recommend continued use of budesoide as needed   Plan:     (R53.81) Physical deconditioning  Comment: Plan to start physical therapy   Plan:     (M54.5,  G89.29) Chronic midline low back pain without sciatica  Comment: Recommend a trial of NSAID  patches  Plan: diclofenac (FLECTOR) 1.3 % Patch

## 2018-10-01 NOTE — PROGRESS NOTES
SUBJECTIVE:   Efrem Rmaos is a 73 year old male who presents to clinic today for the following health issues:          Hospital Follow-up Visit:    Hospital/Nursing Home/IP Rehab Facility: [facility]   Date of Admission: 9/10/18  Date of Discharge: 9/23/18  Reason(s) for Admission: Colitis            Problems taking medications regularly:  None       Medication changes since discharge: None       Problems adhering to non-medication therapy:  None    Summary of hospitalization:  [facility] discharge summary reviewed  Diagnostic Tests/Treatments reviewed.  Follow up needed: gastroenterology   Other Healthcare Providers Involved in Patient s Care:         None  Update since discharge: improved.     Post Discharge Medication Reconciliation: discharge medications reconciled, continue medications without change.  Plan of care communicated with patient     Coding guidelines for this visit:  Type of Medical   Decision Making Face-to-Face Visit       within 7 Days of discharge Face-to-Face Visit        within 14 days of discharge   Moderate Complexity [id number]   High Complexity [id number]            Collagenous colitis   He will plan to follow up in gastroenterology in the next few weeks.    Physical deconditioning   Efrem Ramos was recently admitted to [facility] and discharge to transitional care unit following treatment for collagenous colitis.  He was recommended to continue budesonide 9mg every day.  He did stop imodium due to constipation.   He is taking miralax 17gm on rare occasions.    Cancer of vocal cord (H)   No issues with regards to vocal cord tumor.  Dysphagia, unspecified type   He is having difficulty swallowing with EGD 9/8/18 results showing minimal luminal narrowing.  He was recommended speech evaluation which did not show any concerning findings.  He was recommended to start on prilosec 20mg every day due to esophageal irritation  Transaminitis   He has a plan to  "follow up in gastroenterology         Past medical history, medications, allergies, social history, family history reviewed and updated in [facility] as of 10/1/2018 .    ROS  CONSTITUTIONAL: + fatigue, no unexpected change in weight  SKIN: no worrisome rashes, no worrisome moles, no worrisome lesions  EYES: no acute vision problems or changes  ENT: no ear problems, no mouth problems  RESP: no significant cough, no shortness of breath  CV: no chest pain, no palpitations, no new or worsening peripheral edema  GI:  As above   : no frequency, no dysuria, no hematuria  MS: as above   PSYCHIATRIC: no changes in mood or affect      Objective:  Vitals  /86 (BP Location: Left arm, Patient Position: Sitting, Cuff Size: Adult Regular)  Pulse 66  Temp 98.2  F (36.8  C) (Oral)  Ht 5' 8\" (1.727 m)  Wt 178 lb 1.6 oz (80.8 kg)  SpO2 96%  BMI 27.08 kg/m2  GEN: Alert Oriented x3 NAD  HEENT: Atraumatic, normocephalic, neck supple,   CV: RRR no murmurs or rubs  PULM: CTA no wheezes or crackles  ABD: Soft, nontender nondistended,   SKIN: No visible skin lesion or ulcerations  EXT: No edema bilateral lower extremities  NEURO: Gait and station deferred, No focal neurologic deficits  PSYCH: Mood good, affect mood congruent    No images are attached to the encounter.    No results found for this or any previous visit (from the past 24 hour(s)).    Assessment/Plan:  Patient Instructions   (Z12.11) Special screening for malignant neoplasms, colon  (primary encounter diagnosis)  Comment: referral for CT colongraphy given [facility] phone #[phone]   Plan:     (Z12.11) Screen for colon cancer  Comment: We will check this as you are able  Plan: CT Colonography Screening without Contrast            (Z23) Need for prophylactic vaccination and inoculation against influenza  Comment: due for pneumonia vaccine   Plan:     (Z16.230) Collagenous colitis  Comment: Recommend continued use of budesoide as needed   Plan:     (R53.81) " Physical deconditioning  Comment: Plan to start physical therapy   Plan:     (M54.5,  G89.29) Chronic midline low back pain without sciatica  Comment: Recommend a trial of NSAID  patches  Plan: diclofenac (FLECTOR) 1.3 % Patch               Follow up in gastroenterology     Disclaimer: This note consists of symbols derived from keyboarding, dictation and/or voice recognition software. As a result, there may be errors in the script that have gone undetected. Please consider this when interpreting information found in this chart.    Danny Paige MD  [phone]

## 2018-10-01 NOTE — MR AVS SNAPSHOT
After Visit Summary   10/1/2018    Efrem Ramos    MRN: [id number]           Patient Information     Date Of Birth          1945        Visit Information        Provider Department      10/1/2018 2:00 PM Danny Paige MD [facility] [place]        Today's Diagnoses     Special screening for malignant neoplasms, colon    -  1    Screen for colon cancer        Need for prophylactic vaccination and inoculation against influenza        Collagenous colitis        Physical deconditioning        Chronic midline low back pain without sciatica          Care Instructions    (Z12.11) Special screening for malignant neoplasms, colon  (primary encounter diagnosis)  Comment: referral for CT colongraphy given [facility] phone #[phone]   Plan:     (Z12.11) Screen for colon cancer  Comment: We will check this as you are able  Plan: CT Colonography Screening without Contrast            (Z23) Need for prophylactic vaccination and inoculation against influenza  Comment: due for pneumonia vaccine   Plan:     (K52.831) Collagenous colitis  Comment: Recommend continued use of budesoide as needed   Plan:     (R53.81) Physical deconditioning  Comment: Plan to start physical therapy   Plan:     (M54.5,  G89.29) Chronic midline low back pain without sciatica  Comment: Recommend a trial of NSAID  patches  Plan: diclofenac (FLECTOR) 1.3 % Patch                     Follow-ups after your visit        Your next 10 appointments already scheduled     Oct 04, 2018  1:00 PM CDT   XR BONE SURVEY COMPLETE with SHXR1   [facility] ([facility])    [address] [place] [address]   [phone]           How do I prepare for my exam? (Food and drink instructions) No Food and Drink Restrictions.  How do I prepare for my exam? (Other instructions) You do not need to do anything special for this exam.  What should I wear: Wear comfortable clothes.  How long does  the exam take: Most scans take less than 5 minutes.  What should I bring: Bring a list of your medicines, including vitamins, minerals and over-the-counter drugs. It is safest to leave personal items at home.  Do I need a :  No  is needed.  What do I need to tell my doctor: Tell your doctor if there s any chance you are pregnant.  What should I do after the exam: No restrictions, You may resume normal activities.  What is this test: An image of a specific body part shown in shades of black and white.  Who should I call with questions: If you have any questions, please call the Imaging Department where you will have your exam. Directions, parking instructions, and other information is available on our website, [email or web address].            Dec 27, 2018  1:30 PM CST   Return Visit with  Oncology Nurse   [facility] ([facility])    [facility]  [address]  [facility] [address]   [phone]            Jan 03, 2019  2:00 PM CST   Return Visit with Shae Aldana MD   [facility] ([facility])    [facility]  [address] [facility] [address]  [facility] [address]   [phone]            Aug 14, 2019  1:30 PM CDT   Return Sleep Patient with Ron Pacheco MD   [facility] ([facility])    [address] [address]  [facility] [address]   [phone]              Future tests that were ordered for you today     Open Future Orders        Priority Expected Expires Ordered    CT Colonography Screening without Contrast Routine  10/1/2019 10/1/2018            Who to contact     If you have questions or need follow up information about today's clinic visit or your schedule please contact [facility] directly at [phone].  Normal or non-critical lab and imaging results will be communicated to you by MyChart, letter or phone within 4 business days after the  "clinic has received the results. If you do not hear from us within 7 days, please contact the clinic through [employer] or phone. If you have a critical or abnormal lab result, we will notify you by phone as soon as possible.  Submit refill requests through [employer] or call your pharmacy and they will forward the refill request to us. Please allow 3 business days for your refill to be completed.          Additional Information About Your Visit        [employer] Information     [employer] gives you secure access to your electronic health record. If you see a primary care provider, you can also send messages to your care team and make appointments. If you have questions, please call your primary care clinic.  If you do not have a primary care provider, please call [phone] and they will assist you.        Care EveryWhere ID     This is your Care EveryWhere ID. This could be used by other organizations to access your [place] medical records  [id number]        Your Vitals Were     Pulse Temperature Height Pulse Oximetry BMI (Body Mass Index)       66 98.2  F (36.8  C) (Oral) 5' 8\" (1.727 m) 96% 27.08 kg/m2        Blood Pressure from Last 3 Encounters:   10/01/18 140/86   09/27/18 124/80   09/19/18 120/71    Weight from Last 3 Encounters:   10/01/18 178 lb 1.6 oz (80.8 kg)   09/27/18 180 lb 3.2 oz (81.7 kg)   09/19/18 189 lb 4.8 oz (85.9 kg)                 Today's Medication Changes          These changes are accurate as of 10/1/18  2:41 PM.  If you have any questions, ask your nurse or doctor.               Start taking these medicines.        Dose/Directions    diclofenac 1.3 % Patch   Commonly known as:  FLECTOR   Used for:  Chronic midline low back pain without sciatica   Started by:  Danny Paige MD        Dose:  1 patch   Place 1 patch onto the skin 2 times daily   Quantity:  30 patch   Refills:  1         These medicines have changed or have updated prescriptions.        Dose/Directions    metoprolol " succinate 25 MG 24 hr tablet   Commonly known as:  TOPROL-XL   This may have changed:  when to take this   Used for:  Nonrheumatic aortic valve stenosis        Dose:  12.5 mg   Take 0.5 tablets (12.5 mg) by mouth daily   Quantity:  45 tablet   Refills:  2            Where to get your medicines      These medications were sent to [id number] - [place], [place] - [address] AT [facility] [address], [facility] [address]     Phone:  [phone]     diclofenac 1.3 % Patch                Primary Care Provider Office Phone # Fax #    Danny Paige [phone] [address] [facility] [address]  [facility] [address]        Equal Access to Services     JAMEEL HARRIS : Hadii angle quevedo hadasho Soomaali, waaxda luqadaha, qaybta kaalmada adeegyada, alfonzo olvera . So [facility] [phone].    ATENCIÓN: Si habla español, tiene a palomo disposición servicios gratuitos de asistencia lingüística. Llame al [phone].    We comply with applicable federal civil rights laws and [place] laws. We do not discriminate on the basis of race, color, national origin, age, disability, sex, sexual orientation, or gender identity.            Thank you!     Thank you for choosing [facility]  for your care. Our goal is always to provide you with excellent care. Hearing back from our patients is one way we can continue to improve our services. Please take a few minutes to complete the written survey that you may receive in the mail after your visit with us. Thank you!             Your Updated Medication List - Protect others around you: Learn how to safely use, store and throw away your medicines at www.disposemymeds.org.          This list is accurate as of 10/1/18  2:41 PM.  Always use your most recent med list.                   Brand Name Dispense Instructions for use Diagnosis    acetaminophen 325 MG tablet    TYLENOL     Take 325-650 mg by  mouth every 6 hours as needed for mild pain        apixaban ANTICOAGULANT 5 MG tablet    ELIQUIS    180 tablet    Take 1 tablet (5 mg) by mouth 2 times daily    Atrial fibrillation and flutter (H)       ASPIRIN PO      Take 81 mg by mouth every evening        atorvastatin 40 MG tablet    LIPITOR      MGUS (monoclonal gammopathy of unknown significance)       budesonide 9 MG 24 hr tablet    UCERIS    30 tablet    Take 1 tablet (9 mg) by mouth daily    Collagenous colitis       Cyanocobalamin 2000 MCG Tabs      Take 2,000 mcg by mouth every 7 days On Sundays        diclofenac 1.3 % Patch    FLECTOR    30 patch    Place 1 patch onto the skin 2 times daily    Chronic midline low back pain without sciatica       gabapentin 300 MG capsule    NEURONTIN    60 capsule    Take 2 capsules (600 mg) by mouth every evening    Restless legs syndrome (RLS)       loperamide 2 MG capsule    IMODIUM    20 capsule    Take 2 capsules (4 mg) by mouth 4 times daily as needed for diarrhea    Diarrhea, unspecified type       metoprolol succinate 25 MG 24 hr tablet    TOPROL-XL    45 tablet    Take 0.5 tablets (12.5 mg) by mouth daily    Nonrheumatic aortic valve stenosis       multivitamin Tabs tablet      Take 1 tablet by mouth 2 times daily        PRILOSEC PO      Take 20 mg by mouth every morning        simethicone 80 MG chewable tablet    MYLICON    180 tablet    Take 1 tablet (80 mg) by mouth every 6 hours as needed for cramping    Flatulence, eructation and gas pain

## 2018-10-01 NOTE — NURSING NOTE
"Chief Complaint   Patient presents with     TCU follow up        Initial /86 (BP Location: Left arm, Patient Position: Sitting, Cuff Size: Adult Regular)  Pulse 66  Temp 98.2  F (36.8  C) (Oral)  Ht 5' 8\" (1.727 m)  Wt 178 lb 1.6 oz (80.8 kg)  SpO2 96%  BMI 27.08 kg/m2 Estimated body mass index is 27.08 kg/(m^2) as calculated from the following:    Height as of this encounter: 5' 8\" (1.727 m).    Weight as of this encounter: 178 lb 1.6 oz (80.8 kg)..    BP completed using cuff size: regular  MEDICATIONS REVIEWED  SOCIAL AND FAMILY HX REVIEWED  Bria Clark CMA  "

## 2018-10-01 NOTE — PROGRESS NOTES

## 2018-10-02 ENCOUNTER — TELEPHONE (OUTPATIENT)
Dept: FAMILY MEDICINE | Facility: CLINIC | Age: 73
End: 2018-10-02

## 2018-10-02 DIAGNOSIS — G89.29 CHRONIC MIDLINE LOW BACK PAIN WITHOUT SCIATICA: ICD-10-CM

## 2018-10-02 DIAGNOSIS — M54.50 CHRONIC MIDLINE LOW BACK PAIN WITHOUT SCIATICA: ICD-10-CM

## 2018-10-02 LAB
ANION GAP SERPL CALCULATED.3IONS-SCNC: 9 MMOL/L (ref 3–14)
BUN SERPL-MCNC: 7 MG/DL (ref 7–30)
CALCIUM SERPL-MCNC: 8.9 MG/DL (ref 8.5–10.1)
CHLORIDE SERPL-SCNC: 104 MMOL/L (ref 94–109)
CO2 SERPL-SCNC: 25 MMOL/L (ref 20–32)
CREAT SERPL-MCNC: 0.94 MG/DL (ref 0.66–1.25)
GFR SERPL CREATININE-BSD FRML MDRD: 78 ML/MIN/1.7M2
GLUCOSE SERPL-MCNC: 89 MG/DL (ref 70–99)
POTASSIUM SERPL-SCNC: 4.6 MMOL/L (ref 3.4–5.3)
SODIUM SERPL-SCNC: 138 MMOL/L (ref 133–144)

## 2018-10-02 ASSESSMENT — PATIENT HEALTH QUESTIONNAIRE - PHQ9: SUM OF ALL RESPONSES TO PHQ QUESTIONS 1-9: 24

## 2018-10-02 NOTE — PROGRESS NOTES
Nik Topete,    I have had the opportunity to review your recent results and an interpretation is as follows:  Your follow up basic metabolic panel shows stable sodium and potassium     Sincerely,  Danny Paige MD

## 2018-10-02 NOTE — TELEPHONE ENCOUNTER
Prior Authorization Retail Medication Request    Medication/Dose: flector 1.3 % patch  ICD code (if different than what is on RX):  M54.5, G89.25  Previously Tried and Failed:  Tylenol 325 mg  Rationale:  failed    Insurance Name:  Medicare/medicare  Insurance ID:  [id number]      Pharmacy Information (if different than what is on RX)  Name:  [facility]  Phone:  [phone]

## 2018-10-02 NOTE — TELEPHONE ENCOUNTER
CENTRAL PRIOR AUTHORIZATION  [phone]    PA Initiation    Medication: flector 1.3 % patch  Insurance Company: [employer] - Phone [phone] Fax [phone]  Pharmacy Filling the Rx: [facility] [address] [place] [address] [address] AT [facility]  Filling Pharmacy Phone: [phone]  Filling Pharmacy Fax:    Start Date: 10/2/2018

## 2018-10-03 NOTE — TELEPHONE ENCOUNTER
PRIOR AUTHORIZATION DENIED    Medication: flector 1.3 % patch-DENIED     Denial Date: 10/3/2018    Denial Rational: FLECTOR PATCHES IS DENIED BECAUSE THE USE IS NOT SUPPORTED BY THE FOOD AND DRUG ADMINISTRATION (FDA) OR BY ONE OF THE MEDICARE APPROVED REFERENCES FOR TREATING YOUR MEDICAL CONDITIONS: LOW BACK, CHRONIC MIDLINE LOW BACK PAIN WITHOUT SCIATICA, AND OTHER CHRONIC PAIN. COVERAGE REQUIRES THAT THE REQUESTED DRUG HAS BEEN RECOGNIZED FOR THE TREATMENT OF YOUR MEDICAL CONDITIONS BY ONE OF THE FOLLOWING MEDICARE APPROVED COMPENDIA: (1) [employer] [facility] [employer] DRUG INFORMATION, (2) [employer] DRUGDEX INFORMATION SYSTEM          Appeal Information: IF YOU WANT TO APPEAL THIS DENIAL, PLEASE PROVIDE A LETTER OF MEDICAL NECESSITY TO THE PA TEAM AND ROUTE BACK

## 2018-10-04 ENCOUNTER — HOSPITAL ENCOUNTER (OUTPATIENT)
Dept: GENERAL RADIOLOGY | Facility: CLINIC | Age: 73
Discharge: HOME | End: 2018-10-04
Attending: INTERNAL MEDICINE | Admitting: INTERNAL MEDICINE
Payer: MEDICARE

## 2018-10-04 DIAGNOSIS — D47.2 MGUS (MONOCLONAL GAMMOPATHY OF UNKNOWN SIGNIFICANCE): ICD-10-CM

## 2018-10-04 PROCEDURE — 77075 RADEX OSSEOUS SURVEY COMPL: CPT

## 2018-10-05 ENCOUNTER — TELEPHONE (OUTPATIENT)
Dept: FAMILY MEDICINE | Facility: CLINIC | Age: 73
End: 2018-10-05

## 2018-10-05 LAB — RADIOLOGIST FLAGS: ABNORMAL

## 2018-10-05 NOTE — TELEPHONE ENCOUNTER
Can we call Efrem JULIA Ramos and let him know that     His X-ray showed finding of new L1 compression fracture.  Can we ask him to schedule a follow up in the clinic to review this as it is likely a consequence of osteoporosis.    Danny Paige MD

## 2018-10-08 DIAGNOSIS — I48.91 ATRIAL FIBRILLATION AND FLUTTER (H): ICD-10-CM

## 2018-10-08 DIAGNOSIS — I35.0 NONRHEUMATIC AORTIC VALVE STENOSIS: ICD-10-CM

## 2018-10-08 DIAGNOSIS — I48.92 ATRIAL FIBRILLATION AND FLUTTER (H): ICD-10-CM

## 2018-10-08 RX ORDER — METOPROLOL SUCCINATE 25 MG/1
12.5 TABLET, EXTENDED RELEASE ORAL DAILY
Qty: 45 TABLET | Refills: 2 | Status: SHIPPED | OUTPATIENT
Start: 2018-10-08 | End: 2019-07-05

## 2018-10-09 ENCOUNTER — OFFICE VISIT (OUTPATIENT)
Dept: FAMILY MEDICINE | Facility: CLINIC | Age: 73
End: 2018-10-09
Payer: MEDICARE

## 2018-10-09 VITALS
HEIGHT: 68 IN | TEMPERATURE: 97.2 F | WEIGHT: 174.6 LBS | BODY MASS INDEX: 26.46 KG/M2 | SYSTOLIC BLOOD PRESSURE: 126 MMHG | HEART RATE: 64 BPM | OXYGEN SATURATION: 99 % | DIASTOLIC BLOOD PRESSURE: 72 MMHG

## 2018-10-09 DIAGNOSIS — K52.831 COLLAGENOUS COLITIS: ICD-10-CM

## 2018-10-09 DIAGNOSIS — M80.00XD AGE-RELATED OSTEOPOROSIS WITH CURRENT PATHOLOGICAL FRACTURE WITH ROUTINE HEALING, SUBSEQUENT ENCOUNTER: ICD-10-CM

## 2018-10-09 DIAGNOSIS — M48.56XD NON-TRAUMATIC COMPRESSION FRACTURE OF L1 LUMBAR VERTEBRA WITH ROUTINE HEALING, SUBSEQUENT ENCOUNTER: Primary | ICD-10-CM

## 2018-10-09 PROCEDURE — 82306 VITAMIN D 25 HYDROXY: CPT | Performed by: INTERNAL MEDICINE

## 2018-10-09 PROCEDURE — 99214 OFFICE O/P EST MOD 30 MIN: CPT | Performed by: INTERNAL MEDICINE

## 2018-10-09 PROCEDURE — 36415 COLL VENOUS BLD VENIPUNCTURE: CPT | Performed by: INTERNAL MEDICINE

## 2018-10-09 RX ORDER — HYDROCODONE BITARTRATE AND ACETAMINOPHEN 5; 325 MG/1; MG/1
1 TABLET ORAL EVERY 4 HOURS PRN
Qty: 18 TABLET | Refills: 0 | Status: ON HOLD | OUTPATIENT
Start: 2018-10-09 | End: 2019-07-01

## 2018-10-09 RX ORDER — AMOXICILLIN 250 MG
2 CAPSULE ORAL 2 TIMES DAILY
Qty: 120 TABLET | Refills: 3 | Status: SHIPPED | OUTPATIENT
Start: 2018-10-09 | End: 2019-12-27

## 2018-10-09 RX ORDER — SENNOSIDES A AND B 8.6 MG/1
2 TABLET, FILM COATED ORAL 2 TIMES DAILY
Qty: 120 TABLET | Refills: 3 | Status: SHIPPED | OUTPATIENT
Start: 2018-10-09 | End: 2018-10-09

## 2018-10-09 NOTE — PROGRESS NOTES
"[facility] [place]  CLINIC PROGRESS NOTE    Subjective:  L1 compression fracture   Efrem Ramos was recently noted to have a new compression fracture of lumbar spine at level L1.  This was identified on x-ray bone survey which was ordered due to history of monoclonal gammopathy of undetermined significance.  He consulted with his oncologist who felt that the fracture did not represent any insidious pathology, but more likely due to osteoporosis.  He has never had a bone density scan to this point.  He is taking multivitamin, but not taking either calcium or vitamin D.  He is also taking budesonide tablets for management of his collagenous colitis.  Notably he is no taking omeprazole.   For pain medications he is taking only acetaminophen and diclofenac.  He is not using any brace at this time.       Past medical history, medications, allergies, social history, family history reviewed and updated in [facility] as of 10/9/2018 .    ROS  CONSTITUTIONAL: no fatigue, no unexpected change in weight  SKIN: no worrisome rashes, no worrisome moles, no worrisome lesions  EYES: no acute vision problems or changes  ENT: no ear problems, no mouth problems, no throat problems  RESP: no significant cough, no shortness of breath  CV: no chest pain, no palpitations, no new or worsening peripheral edema  GI: no nausea, no vomiting, no constipation, no diarrhea  : no frequency, no dysuria, no hematuria  MS: significant low back pain   PSYCHIATRIC: no changes in mood or affect      Objective:  Vitals  /72 (BP Location: Right arm, Patient Position: Sitting, Cuff Size: Adult Regular)  Pulse 64  Temp 97.2  F (36.2  C) (Oral)  Ht 5' 8\" (1.727 m)  Wt 174 lb 9.6 oz (79.2 kg)  SpO2 99%  BMI 26.55 kg/m2  GEN: Alert Oriented x3 NAD  HEENT: Atraumatic, normocephalic, neck supple  CV: RRR no murmurs or rubs  PULM: CTA no wheezes or crackles  ABD: Soft, nontender nondistended, no hepatosplenomegally  SKIN: No visible skin lesion or " ulcerations  EXT:  no edema bilateral lower extremities  NEURO: Gait and station stable, No focal neurologic deficits, equal strength 5/5 upper and lower extremities   PSYCH: Mood good, affect mood congruent    No images are attached to the encounter.    No results found for this or any previous visit (from the past 24 hour(s)).    Assessment/Plan:  Patient Instructions   (M48.56XD) Non-traumatic compression fracture of L1 lumbar vertebra with routine healing, subsequent encounter  (primary encounter diagnosis)  Comment: We will refer for urgent spine clinic assessment and start pain control with hydrocodone as needed.  Try to limit hydrocodone due to risk for constipation.  Be very aggressive with stool softeners, drinking fluids and being active.  Walk 5 minutes twice per day  Plan: SPINE SURGERY REFERRAL,         HYDROcodone-acetaminophen (NORCO) 5-325 MG per         tablet            (K52.831) Collagenous colitis  Comment: Noted - continuation of budesonide.  - plan to follow up in gastroenterology   Plan: senna (SENOKOT) 8.6 MG tablet            (M80.00XD) Age-related osteoporosis with current pathological fracture with routine healing, subsequent encounter  Comment: We will refer for bone density scan  [facility] (also performs diagnostic mammogram, ultrasound and biopsy) [phone]. And also check vitamin D.  Pending results we will likely start a medication for osteoporosis and referral to medical therapeutic management was given  Plan: SPINE SURGERY REFERRAL, Vitamin D Deficiency               Follow up in 4 weeks    Disclaimer: This note consists of symbols derived from keyboarding, dictation and/or voice recognition software. As a result, there may be errors in the script that have gone undetected. Please consider this when interpreting information found in this chart.    Danny Paige MD  [phone]

## 2018-10-09 NOTE — MR AVS SNAPSHOT
After Visit Summary   10/9/2018    Efrem Ramos    MRN: [id number]           Patient Information     Date Of Birth          1945        Visit Information        Provider Department      10/9/2018 2:30 PM Danny Paige MD [facility]        Today's Diagnoses     Non-traumatic compression fracture of L1 lumbar vertebra with routine healing, subsequent encounter    -  1    Collagenous colitis        Age-related osteoporosis with current pathological fracture with routine healing, subsequent encounter          Care Instructions    (M48.56XD) Non-traumatic compression fracture of L1 lumbar vertebra with routine healing, subsequent encounter  (primary encounter diagnosis)  Comment: We will refer for urgent spine clinic assessment and start pain control with hydrocodone as needed.  Try to limit hydrocodone due to risk for constipation.  Be very aggressive with stool softeners, drinking fluids and being active.  Walk 5 minutes twice per day  Plan: SPINE SURGERY REFERRAL,         HYDROcodone-acetaminophen (NORCO) 5-325 MG per         tablet            (K52.831) Collagenous colitis  Comment: Noted - continuation of budesonide.  - plan to follow up in gastroenterology   Plan: senna (SENOKOT) 8.6 MG tablet            (M80.00XD) Age-related osteoporosis with current pathological fracture with routine healing, subsequent encounter  Comment: We will refer for bone density scan  [facility] (also performs diagnostic mammogram, ultrasound and biopsy) [phone]. And also check vitamin D.  Pending results we will likely start a medication for osteoporosis and referral to medical therapeutic management was given  Plan: SPINE SURGERY REFERRAL, Vitamin D Deficiency                     Follow-ups after your visit        Additional Services     MED THERAPY MANAGE REFERRAL       Your provider has referred you to: **[facility] Scheduling (numerous  [facility] [phone]   [email or web address]  FMG: [facility] [phone]   [email or web address]    Reason for Referral: osteoporosis     The [facility] department will contact you to schedule an appointment.  You may also schedule the appointment by calling [phone].  For [facility] [place] patients, please call [phone] to confirm/schedule your appointment on the next business day.    This service is designed to help you get the most from your medications.  A specially trained Pharmacist will work closely with you and your providers to solve any questions, concerns, issues or problems related to your medications.    Please bring all of your prescription and non-prescription medications (such as vitamins, over-the-counter medications, and herbals) or a detailed medication list to your appointment.    If you have a glucose meter or other home monitoring information, please also bring this to your appointment (i.e. blood glucose log, blood pressure log, pain log, etc.).            SPINE SURGERY REFERRAL       You have been referred to: Spine Lumbar: Spine Surgeon: FMG: [facility] [phone]   [email or web address]    Please be aware that coverage of these services is subject to the terms and limitations of your health insurance plan.  Call member services at your health plan with any benefit or coverage questions.      Please bring the following to your appointment:    **Any x-rays, CTs or MRIs which have been performed.  Contact the facility where they were done to arrange for  prior to your scheduled appointment.    **List of current medications   **This referral request   **Any documents/labs given to you regarding this referral                  Follow-up notes from your care team      Return in about 4 weeks (around 11/6/2018) for MTM fu for osteoporosis .      Your next 10 appointments already scheduled     Oct 15, 2018  1:30 PM CDT   New Visit with Amadeo Chaves MD   [facility] ([facility])    [address]  [address]  [place] [address]   [phone]            Nov 20, 2018  2:30 PM CST   Return Visit with FABI [facility] ([facility])    [address]  [place] [address]   [phone] OPT 2            Dec 27, 2018  1:30 PM CST   Return Visit with  Oncology Nurse   [facility] ([facility])    [facility]  [address]  [facility] [address]   [phone]            Jan 03, 2019  2:00 PM CST   Return Visit with Shae Aldana MD   [facility] ([facility])    [facility]  [address]  [place] [address]   [phone]            Aug 14, 2019  1:30 PM CDT   Return Sleep Patient with Ron Pacheco MD   [facility] [place] ([facility])    [address]  [facility] [address]   [phone]              Who to contact     If you have questions or need follow up information about today's clinic visit or your schedule please contact [facility] directly at [phone].  Normal or non-critical lab and imaging results will be communicated to you by MyChart, letter or phone within 4 business days after the clinic has received the results. If you do not hear from us within 7 days, please contact the clinic through MyChart or phone. If you have a critical or abnormal lab result, we will notify you by phone as soon as possible.  Submit refill requests through [employer] or call your pharmacy and they will forward the refill request to us. Please allow 3 business days for your  "refill to be completed.          Additional Information About Your Visit        [facility] Information     [employer] gives you secure access to your electronic health record. If you see a primary care provider, you can also send messages to your care team and make appointments. If you have questions, please call your primary care clinic.  If you do not have a primary care provider, please call [phone] and they will assist you.        Care EveryWhere ID     This is your Care EveryWhere ID. This could be used by other organizations to access your [facility] medical records  [id number]        Your Vitals Were     Pulse Temperature Height Pulse Oximetry BMI (Body Mass Index)       64 97.2  F (36.2  C) (Oral) 5' 8\" (1.727 m) 99% 26.55 kg/m2        Blood Pressure from Last 3 Encounters:   10/09/18 126/72   10/01/18 140/86   09/27/18 124/80    Weight from Last 3 Encounters:   10/09/18 174 lb 9.6 oz (79.2 kg)   10/01/18 178 lb 1.6 oz (80.8 kg)   09/27/18 180 lb 3.2 oz (81.7 kg)              We Performed the Following     MED THERAPY MANAGE REFERRAL     SPINE SURGERY REFERRAL     Vitamin D Deficiency          Today's Medication Changes          These changes are accurate as of 10/9/18  3:19 PM.  If you have any questions, ask your nurse or doctor.               Start taking these medicines.        Dose/Directions    HYDROcodone-acetaminophen 5-325 MG per tablet   Commonly known as:  NORCO   Used for:  Non-traumatic compression fracture of L1 lumbar vertebra with routine healing, subsequent encounter   Started by:  Danny Paige MD        Dose:  1 tablet   Take 1 tablet by mouth every 4 hours as needed for pain   Quantity:  18 tablet   Refills:  0       senna-docusate 8.6-50 MG per tablet   Commonly known as:  SENOKOT-S;PERICOLACE   Used for:  Collagenous colitis   Started by:  Danny Paige MD        Dose:  2 tablet   Take 2 tablets by mouth 2 times daily   Quantity:  120 tablet   Refills:  3       "   Stop taking these medicines if you haven't already. Please contact your care team if you have questions.     diclofenac 1.3 % Patch   Commonly known as:  FLECTOR   Stopped by:  Danny Paige MD           PRILOSEC PO   Stopped by:  Danny Paige MD                Where to get your medicines      These medications were sent to [facility] [id number] [place] [address] [address] [facility] [address] [place], [facility] [address]     Phone:  [phone]     senna-docusate 8.6-50 MG per tablet         Some of these will need a paper prescription and others can be bought over the counter.  Ask your nurse if you have questions.     Bring a paper prescription for each of these medications     HYDROcodone-acetaminophen 5-325 MG per tablet               Information about OPIOIDS     PRESCRIPTION OPIOIDS: WHAT YOU NEED TO KNOW   We gave you an opioid (narcotic) pain medicine. It is important to manage your pain, but opioids are not always the best choice. You should first try all the other options your care team gave you. Take this medicine for as short a time (and as few doses) as possible.    Some activities can increase your pain, such as bandage changes or therapy sessions. It may help to take your pain medicine 30 to 60 minutes before these activities. Reduce your stress by getting enough sleep, working on hobbies you enjoy and practicing relaxation or meditation. Talk to your care team about ways to manage your pain beyond prescription opioids.    These medicines have risks:    DO NOT drive when on new or higher doses of pain medicine. These medicines can affect your alertness and reaction times, and you could be arrested for driving under the influence (DUI). If you need to use opioids long-term, talk to your care team about driving.    DO NOT operate heavy machinery    DO NOT do any other dangerous activities while taking these medicines.    DO NOT drink  any alcohol while taking these medicines.     If the opioid prescribed includes acetaminophen, DO NOT take with any other medicines that contain acetaminophen. Read all labels carefully. Look for the word  acetaminophen  or  Tylenol.  Ask your pharmacist if you have questions or are unsure.    You can get addicted to pain medicines, especially if you have a history of addiction (chemical, alcohol or substance dependence). Talk to your care team about ways to reduce this risk.    All opioids tend to cause constipation. Drink plenty of water and eat foods that have a lot of fiber, such as fruits, vegetables, prune juice, apple juice and high-fiber cereal. Take a laxative (Miralax, milk of magnesia, Colace, Senna) if you don t move your bowels at least every other day. Other side effects include upset stomach, sleepiness, dizziness, throwing up, tolerance (needing more of the medicine to have the same effect), physical dependence and slowed breathing.    Store your pills in a secure place, locked if possible. We will not replace any lost or stolen medicine. If you don t finish your medicine, please throw away (dispose) as directed by your pharmacist. The [place] Pollution Control Agency has more information about safe disposal: [email or web address]-medications         Primary Care Provider Office Phone # Fax #    Danny Paige [phone] [address] [facility] [address]  [facility] [address]        Equal Access to Services     AJMEEL HARRIS : Hadii angle quevedo hadasho Sojonasali, waaxda luqadaha, qaybta kaalmada adeegyada, alfonzo lynch. So [facility] [phone].    ATENCIÓN: Si habla español, tiene a palomo disposición servicios gratuitos de asistencia lingüística. Llame al [phone].    We comply with applicable federal civil rights laws and [place] laws. We do not discriminate on the basis of race, color, national origin, age, disability,  sex, sexual orientation, or gender identity.            Thank you!     Thank you for choosing [facility]  for your care. Our goal is always to provide you with excellent care. Hearing back from our patients is one way we can continue to improve our services. Please take a few minutes to complete the written survey that you may receive in the mail after your visit with us. Thank you!             Your Updated Medication List - Protect others around you: Learn how to safely use, store and throw away your medicines at www.disposemymeds.org.          This list is accurate as of 10/9/18  3:19 PM.  Always use your most recent med list.                   Brand Name Dispense Instructions for use Diagnosis    acetaminophen 325 MG tablet    TYLENOL     Take 325-650 mg by mouth every 6 hours as needed for mild pain        apixaban ANTICOAGULANT 5 MG tablet    ELIQUIS    180 tablet    Take 1 tablet (5 mg) by mouth 2 times daily    Atrial fibrillation and flutter (H)       ASPIRIN PO      Take 81 mg by mouth every evening        atorvastatin 40 MG tablet    LIPITOR      MGUS (monoclonal gammopathy of unknown significance)       budesonide 9 MG 24 hr tablet    UCERIS    30 tablet    Take 1 tablet (9 mg) by mouth daily    Collagenous colitis       Cyanocobalamin 2000 MCG Tabs      Take 2,000 mcg by mouth every 7 days On Sundays        gabapentin 300 MG capsule    NEURONTIN    60 capsule    Take 2 capsules (600 mg) by mouth every evening    Restless legs syndrome (RLS)       HYDROcodone-acetaminophen 5-325 MG per tablet    NORCO    18 tablet    Take 1 tablet by mouth every 4 hours as needed for pain    Non-traumatic compression fracture of L1 lumbar vertebra with routine healing, subsequent encounter       loperamide 2 MG capsule    IMODIUM    20 capsule    Take 2 capsules (4 mg) by mouth 4 times daily as needed for diarrhea    Diarrhea, unspecified type       metoprolol succinate 25 MG 24 hr tablet    TOPROL-XL    45  tablet    Take 0.5 tablets (12.5 mg) by mouth daily    Nonrheumatic aortic valve stenosis       multivitamin Tabs tablet      Take 1 tablet by mouth 2 times daily        senna-docusate 8.6-50 MG per tablet    SENOKOT-S;PERICOLACE    120 tablet    Take 2 tablets by mouth 2 times daily    Collagenous colitis       simethicone 80 MG chewable tablet    MYLICON    180 tablet    Take 1 tablet (80 mg) by mouth every 6 hours as needed for cramping    Flatulence, eructation and gas pain

## 2018-10-09 NOTE — PATIENT INSTRUCTIONS
(M48.56XD) Non-traumatic compression fracture of L1 lumbar vertebra with routine healing, subsequent encounter  (primary encounter diagnosis)  Comment: We will refer for urgent spine clinic assessment and start pain control with hydrocodone as needed.  Try to limit hydrocodone due to risk for constipation.  Be very aggressive with stool softeners, drinking fluids and being active.  Walk 5 minutes twice per day  Plan: SPINE SURGERY REFERRAL,         HYDROcodone-acetaminophen (NORCO) 5-325 MG per         tablet            (K52.831) Collagenous colitis  Comment: Noted - continuation of budesonide.  - plan to follow up in gastroenterology   Plan: senna (SENOKOT) 8.6 MG tablet            (M80.00XD) Age-related osteoporosis with current pathological fracture with routine healing, subsequent encounter  Comment: We will refer for bone density scan  [facility] (also performs diagnostic mammogram, ultrasound and biopsy) [phone]. And also check vitamin D.  Pending results we will likely start a medication for osteoporosis and referral to medical therapeutic management was given  Plan: SPINE SURGERY REFERRAL, Vitamin D Deficiency

## 2018-10-10 LAB — DEPRECATED CALCIDIOL+CALCIFEROL SERPL-MC: 28 UG/L (ref 20–75)

## 2018-10-11 NOTE — PROGRESS NOTES
Nik Topete,    I have had the opportunity to review your recent results and an interpretation is as follows:  Your vitamin D returned within normal limits - we will await results of bone density scan and discussion with medical therapeutic management      Sincerely,  Danny Paige MD

## 2018-10-12 ENCOUNTER — HOSPITAL ENCOUNTER (OUTPATIENT)
Dept: MRI IMAGING | Facility: CLINIC | Age: 73
Discharge: HOME | End: 2018-10-12
Attending: INTERNAL MEDICINE | Admitting: INTERNAL MEDICINE
Payer: MEDICARE

## 2018-10-12 DIAGNOSIS — M48.56XD NON-TRAUMATIC COMPRESSION FRACTURE OF L1 LUMBAR VERTEBRA WITH ROUTINE HEALING, SUBSEQUENT ENCOUNTER: ICD-10-CM

## 2018-10-12 PROCEDURE — 72148 MRI LUMBAR SPINE W/O DYE: CPT

## 2018-10-12 NOTE — PROGRESS NOTES
Nik Topete,    I have had the opportunity to review    Your MRI does confirm the presence of a lumbar compression fracture at the level of L1.  We will plan to follow-up with neurosurgery next week    Sincerely,  Danny Paige MD

## 2018-10-15 ENCOUNTER — OFFICE VISIT (OUTPATIENT)
Dept: NEUROSURGERY | Facility: CLINIC | Age: 73
End: 2018-10-15
Attending: NEUROLOGICAL SURGERY
Payer: MEDICARE

## 2018-10-15 VITALS
SYSTOLIC BLOOD PRESSURE: 110 MMHG | WEIGHT: 174 LBS | DIASTOLIC BLOOD PRESSURE: 74 MMHG | BODY MASS INDEX: 26.37 KG/M2 | HEART RATE: 71 BPM | OXYGEN SATURATION: 94 % | HEIGHT: 68 IN

## 2018-10-15 DIAGNOSIS — M48.56XA COLLAPSED VERTEBRA, NOT ELSEWHERE CLASSIFIED, LUMBAR REGION, INITIAL ENCOUNTER FOR FRACTURE (H): ICD-10-CM

## 2018-10-15 DIAGNOSIS — S32.010A CLOSED COMPRESSION FRACTURE OF FIRST LUMBAR VERTEBRA, INITIAL ENCOUNTER: Primary | ICD-10-CM

## 2018-10-15 PROCEDURE — 99214 OFFICE O/P EST MOD 30 MIN: CPT | Performed by: PHYSICIAN ASSISTANT

## 2018-10-15 PROCEDURE — G0463 HOSPITAL OUTPT CLINIC VISIT: HCPCS

## 2018-10-15 ASSESSMENT — PAIN SCALES - GENERAL: PAINLEVEL: SEVERE PAIN (6)

## 2018-10-15 NOTE — NURSING NOTE
"Efrem Ramos is a 73 year old male who presents for:  Chief Complaint   Patient presents with     Neurologic Problem     Non-traumatic compression fracture of L1 lumbar vertebra with routine healing. No brace on today         Vitals:    Vitals:    10/15/18 1329   BP: 110/74   BP Location: Left arm   Patient Position: Sitting   Cuff Size: Adult Regular   Pulse: 71   SpO2: 94%   Weight: 174 lb (78.9 kg)   Height: 5' 8\" (1.727 m)       BMI:  Estimated body mass index is 26.46 kg/(m^2) as calculated from the following:    Height as of this encounter: 5' 8\" (1.727 m).    Weight as of this encounter: 174 lb (78.9 kg).    Pain Score:  Severe Pain (6)      Do you feel safe in your environment?  Yes      Joy Montoya          "

## 2018-10-15 NOTE — PROGRESS NOTES
Dr. Amadeo Chaves  [facility]  Neurosurgery Clinic Visit      CC: Back pain    Primary care Provider: Danny Paige      Reason For Visit:   I was asked by Danny Paige MD to consult on the patient for non traumatic compression fracture of L1 lumbar vertebrae with routine healing.      HPI: Efrem Ramos is a 73 year old male who presents for evaluation of back pain x 3-4 weeks. He was trying to get out of bed too quickly and had sudden acute back pain. He was recently found to have L1 compression fracture on XR bone survey and followed up with oncology who felt fracture was likely due to osteoporosis. Pain is located in bilateral low back and does not radiate down into the legs. Describes the pain as a constant ache with intermittent sharpness. Pain is worsened with movement. Pain is alleviated with tylenol. He does not have a brace. Denies bladder/bowel incontinence.    Current pain: 6-7/10 At worst: 10/10    Past Medical History:   Diagnosis Date     Atrial fibrillation (H)      Cancer (H) vocal cord     Carpal tunnel syndrome     abstracted 7/3/02.     CVA (cerebral infarction) 5/5/2015     Demyelinating disease of central nervous system, unspecified (H)     abstracted 7/3/02.     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     Pneumonia 8/17/2016     Redundant colon     needs CT colonography     S/P coronary angiogram 09/05/2017     Shingles      SKIN DISORDERS NEC 3/15/2005     Sleep apnea        Past Medical History reviewed with patient during visit.    Past Surgical History:   Procedure Laterality Date     BIOPSY  brain 2002     BONE MARROW BIOPSY, BONE SPECIMEN, NEEDLE/TROCAR N/A 6/8/2017    Procedure: BIOPSY BONE MARROW;  UNILATERAL BONE MARROW BIOPSY (CONSCIOUS SEDATION) ;  Surgeon: Jamie Gonzales,  MD;  Location:  GI     C NONSPECIFIC PROCEDURE  as a child    T & A. abstracted 7/3/02.     C NONSPECIFIC PROCEDURE  early    CTR. abstracted 7/3/02.     ESOPHAGOSCOPY, GASTROSCOPY, DUODENOSCOPY (EGD), COMBINED N/A 9/8/2018    Procedure: COMBINED ESOPHAGOSCOPY, GASTROSCOPY, DUODENOSCOPY (EGD), BIOPSY SINGLE OR MULTIPLE;;  Surgeon: Xander Marie MD;  Location:  GI     HEAD & NECK SURGERY       ORTHOPEDIC SURGERY      right arm ulna reset after injury     THORACOSCOPIC WEDGE RESECTION LUNG Right 8/2/2016    Procedure: THORACOSCOPIC WEDGE RESECTION LUNG;  Surgeon: Abdelrahman Noriega MD;  Location:  OR     Past Surgical History reviewed with patient during visit.    Current Outpatient Prescriptions   Medication     acetaminophen (TYLENOL) 325 MG tablet     apixaban ANTICOAGULANT (ELIQUIS) 5 MG tablet     ASPIRIN PO     atorvastatin (LIPITOR) 40 MG tablet     budesonide (UCERIS) 9 MG 24 hr tablet     Cyanocobalamin 2000 MCG TABS     gabapentin (NEURONTIN) 300 MG capsule     HYDROcodone-acetaminophen (NORCO) 5-325 MG per tablet     loperamide (IMODIUM) 2 MG capsule     metoprolol succinate (TOPROL-XL) 25 MG 24 hr tablet     multivitamin (OCUVITE) TABS     senna-docusate (SENOKOT-S;PERICOLACE) 8.6-50 MG per tablet     simethicone (MYLICON) 80 MG chewable tablet     No current facility-administered medications for this visit.        Allergies   Allergen Reactions     Adhesive Tape Blisters     Amiodarone      Lasix [Furosemide] Other (See Comments)     Throat swelling, wheezing     Penicillins Unknown     Sulfa Drugs Difficulty breathing       Social History     Social History     Marital status:      Spouse name: N/A     Number of children: N/A     Years of education: N/A     Social History Main Topics     Smoking status: Former Smoker     Packs/day: 1.00     Years: 30.00     Types: Cigarettes     Quit date: 7/26/2013     Smokeless tobacco: Never Used     Alcohol use 25.2 oz/week     42  "Standard drinks or equivalent per week      Comment: occ     Drug use: No     Sexual activity: Not Currently     Other Topics Concern     Caffeine Concern Yes     1 Coke occasionally      Special Diet No     Exercise No     Social History Narrative       Family History   Problem Relation Age of Onset     Blood Disease Mother      Anemia     Cardiovascular Father      Cancer - colorectal Maternal Grandfather      Hypertension Brother      Diabetes Sister 5     Juvinile Diabetes passed at 36     Respiratory Other      Lung Cancer          ROS: 10 point ROS neg other than the symptoms noted above in the HPI.    Vital Signs: /74 (BP Location: Left arm, Patient Position: Sitting, Cuff Size: Adult Regular)  Pulse 71  Ht 5' 8\" (1.727 m)  Wt 174 lb (78.9 kg)  SpO2 94%  BMI 26.46 kg/m2    Examination:  Constitutional:  Alert, well nourished, NAD.  HEENT: Normocephalic, atraumatic.   Pulmonary:  Without shortness of breath, normal effort.   Lymph: no lymphadenopathy to low back or LE.   Integumentary: Skin is free of rashes or lesions.   Cardiovascular:  No pitting edema of BLE.      Neurological:  Awake  Alert  Oriented x 3  Speech clear  Cranial nerves II - XII grossly intact  PERRL  EOMI  Face symmetric  Tongue midline  Motor exam   Hip Flexor:                Right: 5/5  Left:  5/5  Hip Adductor:             Right:  5/5  Left:  5/5  Hip Abductor:             Right:  5/5  Left:  5/5  Gastroc Soleus:        Right:  5/5  Left:  5/5  Tib/Ant:                      Right:  5/5  Left:  5/5  EHL:                          Right:  5/5  Left:  5/5       Sensation normal to bilateral upper and lower extremities.    Reflexes are 2+ in the patellar and Achilles. There is no clonus. Downgoing Babinski.  Musculoskeletal:  Gait: Ambulates with walker. Increased pain when going from standing to seated.  Lumbar examination reveals no tenderness of the spine or paraspinous muscles.  Hip height is symmetrical. Negative SI joint, " sciatic notch or greater trochanteric tenderness to palpation bilaterally.      Imaging:   MRI of the lumbar spine from 10/12/2018 was reviewed in the office today. Reveals acute/subacute L1 compression fracture.     XR bone survey from 10/4/2018 revealed L1 compression fracture with 30% loss of vertebral height.    Assessment/Plan:   Efrem Ramos is a 73 year old male who presents for evaluation of back pain x 3-4 weeks. He was trying to get out of bed too quickly and had sudden acute back pain. He was recently found to have L1 compression fracture on XR bone survey and followed up with oncology who felt fracture was likely due to osteoporosis. Pain is located in bilateral low back and does not radiate down into the legs. Describes the pain as a constant ache with intermittent sharpness. MRI and bone survey XR with acute/subacute L1 compression fracture with 30% height loss. Will place referral for evaluation for vertebroplasty. Discussed activity restrictions (no lifting >10-15 pounds bending, twisting, overhead reaching). Patient voiced understating and agreement.          Ewa Rodríguez PA-C  [facility]  [address] [address]    Tel [phone]  Pager [phone]

## 2018-10-15 NOTE — MR AVS SNAPSHOT
After Visit Summary   10/15/2018    Efrem Ramos    MRN: [id number]           Patient Information     Date Of Birth          1945        Visit Information        Provider Department      10/15/2018 1:30 PM Ewa Rodríguez PA-C [facility]        Today's Diagnoses     Closed compression fracture of first lumbar vertebra, initial encounter (H)    -  1    Collapsed vertebra, not elsewhere classified, lumbar region, initial encounter for fracture (H)           Care Instructions    Lumbar vertebroplasty referral placed - they will contact you to schedule    Please contact the clinic if pain persists at [phone].            Follow-ups after your visit        Your next 10 appointments already scheduled     Oct 22, 2018  2:00 PM CDT   Office Visit with Amparo Antunez RPH   [facility] ([facility])    [address]  [place] [id number] [phone]           Bring a current list of meds and any records pertaining to this visit. For Physicals, please bring immunization records and any forms needing to be filled out. Please arrive 10 minutes early to complete paperwork.            Nov 20, 2018  2:30 PM CST   Return Visit with FABI Griffith CNP   [facility] ([facility])    [address]  [place] [address] [phone] OPT 2            Dec 27, 2018  1:30 PM CST   Return Visit with  Oncology Nurse   [facility] ([facility])    [facility]  [address]  [place] [address] [phone]            Jan 03, 2019  2:00 PM CST   Return Visit with Shae Aldana MD   [facility] ([facility])    [facility]  [address]  [place] [address] [phone]            Aug 14, 2019  1:30 PM CDT   Return Sleep Patient with Ron  "MD Junior   [facility] ([facility])    [address] [address] [phone]              Future tests that were ordered for you today     Open Future Orders        Priority Expected Expires Ordered    IR Lumbar Vertebroplasty Routine  10/15/2019 10/15/2018            Who to contact     If you have questions or need follow up information about today's clinic visit or your schedule please contact [facility] directly at [phone].  Normal or non-critical lab and imaging results will be communicated to you by [employer], letter or phone within 4 business days after the clinic has received the results. If you do not hear from us within 7 days, please contact the clinic through "[employer] or phone. If you have a critical or abnormal lab result, we will notify you by phone as soon as possible.  Submit refill requests through "[employer] or call your pharmacy and they will forward the refill request to us. Please allow 3 business days for your refill to be completed.          Additional Information About Your Visit        "[employer] Information     "[employer] gives you secure access to your electronic health record. If you see a primary care provider, you can also send messages to your care team and make appointments. If you have questions, please call your primary care clinic.  If you do not have a primary care provider, please call [phone] and they will assist you.        Care EveryWhere ID     This is your Care EveryWhere ID. This could be used by other organizations to access your [facility] medical records  [id number]        Your Vitals Were     Pulse Height Pulse Oximetry BMI (Body Mass Index)          71 5' 8\" (1.727 m) 94% 26.46 kg/m2         Blood Pressure from Last 3 Encounters:   10/15/18 110/74   10/09/18 126/72   10/01/18 140/86    Weight from Last 3 Encounters:   10/15/18 174 lb (78.9 kg)   10/09/18 174 lb 9.6 oz (79.2 " kg)   10/01/18 178 lb 1.6 oz (80.8 kg)               Primary Care Provider Office Phone # Fax #    Danny Paige [phone] [address] [place] [address]        Equal Access to Services     [facility] KIMBERLY : Hadii aad ku hadasho Soomaali, waaxda luqadaha, qaybta kaalmada adeegyada, waxay idiin hayaan adedarleen yehuda lacarlos . So [facility] [phone].    ATENCIÓN: Si habla español, tiene a palomo disposición servicios gratuitos de asistencia lingüística. Shilo al [phone].    We comply with applicable federal civil rights laws and [place] laws. We do not discriminate on the basis of race, color, national origin, age, disability, sex, sexual orientation, or gender identity.            Thank you!     Thank you for choosing [facility]  for your care. Our goal is always to provide you with excellent care. Hearing back from our patients is one way we can continue to improve our services. Please take a few minutes to complete the written survey that you may receive in the mail after your visit with us. Thank you!             Your Updated Medication List - Protect others around you: Learn how to safely use, store and throw away your medicines at www.disposemymeds.org.          This list is accurate as of 10/15/18  1:49 PM.  Always use your most recent med list.                   Brand Name Dispense Instructions for use Diagnosis    acetaminophen 325 MG tablet    TYLENOL     Take 325-650 mg by mouth every 6 hours as needed for mild pain        apixaban ANTICOAGULANT 5 MG tablet    ELIQUIS    180 tablet    Take 1 tablet (5 mg) by mouth 2 times daily    Atrial fibrillation and flutter (H)       ASPIRIN PO      Take 81 mg by mouth every evening        atorvastatin 40 MG tablet    LIPITOR      MGUS (monoclonal gammopathy of unknown significance)       budesonide 9 MG 24 hr tablet    UCERIS    30 tablet    Take 1 tablet (9 mg) by mouth daily    Collagenous  colitis       Cyanocobalamin 2000 MCG Tabs      Take 2,000 mcg by mouth every 7 days On Sundays        gabapentin 300 MG capsule    NEURONTIN    60 capsule    Take 2 capsules (600 mg) by mouth every evening    Restless legs syndrome (RLS)       HYDROcodone-acetaminophen 5-325 MG per tablet    NORCO    18 tablet    Take 1 tablet by mouth every 4 hours as needed for pain    Non-traumatic compression fracture of L1 lumbar vertebra with routine healing, subsequent encounter       loperamide 2 MG capsule    IMODIUM    20 capsule    Take 2 capsules (4 mg) by mouth 4 times daily as needed for diarrhea    Diarrhea, unspecified type       metoprolol succinate 25 MG 24 hr tablet    TOPROL-XL    45 tablet    Take 0.5 tablets (12.5 mg) by mouth daily    Nonrheumatic aortic valve stenosis       multivitamin Tabs tablet      Take 1 tablet by mouth 2 times daily        senna-docusate 8.6-50 MG per tablet    SENOKOT-S;PERICOLACE    120 tablet    Take 2 tablets by mouth 2 times daily    Collagenous colitis       simethicone 80 MG chewable tablet    MYLICON    180 tablet    Take 1 tablet (80 mg) by mouth every 6 hours as needed for cramping    Flatulence, eructation and gas pain

## 2018-10-15 NOTE — PATIENT INSTRUCTIONS
Lumbar vertebroplasty referral placed - they will contact you to schedule    Please contact the clinic if pain persists at [phone].

## 2018-10-15 NOTE — LETTER
10/15/2018         RE: Efrem Ramos  [address]  [place] [address]        Dear Colleague,    Thank you for referring your patient, Efrem Ramos, to the [facility]UROS[facility]ERY [facility]IC. Please see a copy of my visit note below.    Dr. Amadeo Chaves  [facility]  Neurosurgery Clinic Visit      CC: Back pain    Primary care Provider: Danny Paige      Reason For Visit:   I was asked by Danny Paige MD to consult on the patient for non traumatic compression fracture of L1 lumbar vertebrae with routine healing.      HPI: Efrem Ramos is a 73 year old male who presents for evaluation of back pain x 3-4 weeks. He was trying to get out of bed too quickly and had sudden acute back pain. He was recently found to have L1 compression fracture on XR bone survey and followed up with oncology who felt fracture was likely due to osteoporosis. Pain is located in bilateral low back and does not radiate down into the legs. Describes the pain as a constant ache with intermittent sharpness. Pain is worsened with movement. Pain is alleviated with tylenol. He does not have a brace. Denies bladder/bowel incontinence.    Current pain: 6-7/10 At worst: 10/10    Past Medical History:   Diagnosis Date     Atrial fibrillation (H)      Cancer (H) vocal cord     Carpal tunnel syndrome     abstracted 7/3/02.     CVA (cerebral infarction) 5/5/2015     Demyelinating disease of central nervous system, unspecified (H)     abstracted 7/3/02.     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     Pneumonia 8/17/2016     Redundant colon     needs CT colonography     S/P coronary angiogram 09/05/2017     Shingles      SKIN DISORDERS NEC 3/15/2005     Sleep apnea        Past Medical History reviewed with patient during  visit.    Past Surgical History:   Procedure Laterality Date     BIOPSY  brain 2002     BONE MARROW BIOPSY, BONE SPECIMEN, NEEDLE/TROCAR N/A 6/8/2017    Procedure: BIOPSY BONE MARROW;  UNILATERAL BONE MARROW BIOPSY (CONSCIOUS SEDATION) ;  Surgeon: Jamie Gonzales MD;  Location:  GI     C NONSPECIFIC PROCEDURE  as a child    T & A. abstracted 7/3/02.     C NONSPECIFIC PROCEDURE  early    CTR. abstracted 7/3/02.     ESOPHAGOSCOPY, GASTROSCOPY, DUODENOSCOPY (EGD), COMBINED N/A 9/8/2018    Procedure: COMBINED ESOPHAGOSCOPY, GASTROSCOPY, DUODENOSCOPY (EGD), BIOPSY SINGLE OR MULTIPLE;;  Surgeon: Xander Marie MD;  Location:  GI     HEAD & NECK SURGERY       ORTHOPEDIC SURGERY      right arm ulna reset after injury     THORACOSCOPIC WEDGE RESECTION LUNG Right 8/2/2016    Procedure: THORACOSCOPIC WEDGE RESECTION LUNG;  Surgeon: Abdelrahman Noriega MD;  Location:  OR     Past Surgical History reviewed with patient during visit.    Current Outpatient Prescriptions   Medication     acetaminophen (TYLENOL) 325 MG tablet     apixaban ANTICOAGULANT (ELIQUIS) 5 MG tablet     ASPIRIN PO     atorvastatin (LIPITOR) 40 MG tablet     budesonide (UCERIS) 9 MG 24 hr tablet     Cyanocobalamin 2000 MCG TABS     gabapentin (NEURONTIN) 300 MG capsule     HYDROcodone-acetaminophen (NORCO) 5-325 MG per tablet     loperamide (IMODIUM) 2 MG capsule     metoprolol succinate (TOPROL-XL) 25 MG 24 hr tablet     multivitamin (OCUVITE) TABS     senna-docusate (SENOKOT-S;PERICOLACE) 8.6-50 MG per tablet     simethicone (MYLICON) 80 MG chewable tablet     No current facility-administered medications for this visit.        Allergies   Allergen Reactions     Adhesive Tape Blisters     Amiodarone      Lasix [Furosemide] Other (See Comments)     Throat swelling, wheezing     Penicillins Unknown     Sulfa Drugs Difficulty breathing       Social History     Social History     Marital status:      Spouse name: N/A     Number of  "children: N/A     Years of education: N/A     Social History Main Topics     Smoking status: Former Smoker     Packs/day: 1.00     Years: 30.00     Types: Cigarettes     Quit date: 7/26/2013     Smokeless tobacco: Never Used     Alcohol use 25.2 oz/week     42 Standard drinks or equivalent per week      Comment: occ     Drug use: No     Sexual activity: Not Currently     Other Topics Concern     Caffeine Concern Yes     1 Coke occasionally      Special Diet No     Exercise No     Social History Narrative       Family History   Problem Relation Age of Onset     Blood Disease Mother      Anemia     Cardiovascular Father      Cancer - colorectal Maternal Grandfather      Hypertension Brother      Diabetes Sister 5     Juvinile Diabetes passed at 36     Respiratory Other      Lung Cancer          ROS: 10 point ROS neg other than the symptoms noted above in the HPI.    Vital Signs: /74 (BP Location: Left arm, Patient Position: Sitting, Cuff Size: Adult Regular)  Pulse 71  Ht 5' 8\" (1.727 m)  Wt 174 lb (78.9 kg)  SpO2 94%  BMI 26.46 kg/m2    Examination:  Constitutional:  Alert, well nourished, NAD.  HEENT: Normocephalic, atraumatic.   Pulmonary:  Without shortness of breath, normal effort.   Lymph: no lymphadenopathy to low back or LE.   Integumentary: Skin is free of rashes or lesions.   Cardiovascular:  No pitting edema of BLE.      Neurological:  Awake  Alert  Oriented x 3  Speech clear  Cranial nerves II - XII grossly intact  PERRL  EOMI  Face symmetric  Tongue midline  Motor exam   Hip Flexor:                Right: 5/5  Left:  5/5  Hip Adductor:             Right:  5/5  Left:  5/5  Hip Abductor:             Right:  5/5  Left:  5/5  Gastroc Soleus:        Right:  5/5  Left:  5/5  Tib/Ant:                      Right:  5/5  Left:  5/5  EHL:                          Right:  5/5  Left:  5/5       Sensation normal to bilateral upper and lower extremities.    Reflexes are 2+ in the patellar and Achilles. There " is no clonus. Downgoing Babinski.  Musculoskeletal:  Gait: Ambulates with walker. Increased pain when going from standing to seated.  Lumbar examination reveals no tenderness of the spine or paraspinous muscles.  Hip height is symmetrical. Negative SI joint, sciatic notch or greater trochanteric tenderness to palpation bilaterally.      Imaging:   MRI of the lumbar spine from 10/12/2018 was reviewed in the office today. Reveals acute/subacute L1 compression fracture.     XR bone survey from 10/4/2018 revealed L1 compression fracture with 30% loss of vertebral height.    Assessment/Plan:   Efrem Ramos is a 73 year old male who presents for evaluation of back pain x 3-4 weeks. He was trying to get out of bed too quickly and had sudden acute back pain. He was recently found to have L1 compression fracture on XR bone survey and followed up with oncology who felt fracture was likely due to osteoporosis. Pain is located in bilateral low back and does not radiate down into the legs. Describes the pain as a constant ache with intermittent sharpness. MRI and bone survey XR with acute/subacute L1 compression fracture with 30% height loss. Will place referral for evaluation for vertebroplasty. Discussed activity restrictions (no lifting >10-15 pounds bending, twisting, overhead reaching). Patient voiced understating and agreement.          Ewa Rodríguez PA-C  [facility]  [address] [address]    Tel [phone]  Pager [phone]      Again, thank you for allowing me to participate in the care of your patient.        Sincerely,        Ewa Rodríguez PA-C

## 2018-10-18 ENCOUNTER — TRANSFERRED RECORDS (OUTPATIENT)
Dept: HEALTH INFORMATION MANAGEMENT | Facility: CLINIC | Age: 73
End: 2018-10-18

## 2018-10-19 ENCOUNTER — DOCUMENTATION ONLY (OUTPATIENT)
Dept: CARDIOLOGY | Facility: CLINIC | Age: 73
End: 2018-10-19

## 2018-10-19 DIAGNOSIS — I48.0 PAROXYSMAL ATRIAL FIBRILLATION (H): Primary | ICD-10-CM

## 2018-10-19 NOTE — PROGRESS NOTES
Called pt's wife with recommendations from Marya Vela NP. Wife verbalized understanding. Prescription escripted to [facility] as requested. [place] RN

## 2018-10-19 NOTE — PROGRESS NOTES
Received call from patients wife stating patient has fallen and needing to undergo a vetebralplasty on Monday. Patient is on Eliquis for atrial fibrillation. Due to his history of stroke and CHADsVAS - 5, he needs to be bridged with Lovenox 1mg/kg every 12 hr prior to procedure. Please notify patient to take last dose of Eliquis this am. Start Lovenox injections tonight then twice Saturday, and Sunday ( last dose Sunday night). Resume Eliquis post procedure when surgeon feels bleeding risk is low.    Thanks

## 2018-10-22 ENCOUNTER — HOSPITAL ENCOUNTER (OUTPATIENT)
Facility: CLINIC | Age: 73
Discharge: HOME | End: 2018-10-22
Attending: RADIOLOGY | Admitting: RADIOLOGY
Payer: MEDICARE

## 2018-10-22 ENCOUNTER — OFFICE VISIT (OUTPATIENT)
Dept: PHARMACY | Facility: CLINIC | Age: 73
End: 2018-10-22
Payer: COMMERCIAL

## 2018-10-22 ENCOUNTER — APPOINTMENT (OUTPATIENT)
Dept: INTERVENTIONAL RADIOLOGY/VASCULAR | Facility: CLINIC | Age: 73
End: 2018-10-22
Attending: PHYSICIAN ASSISTANT
Payer: MEDICARE

## 2018-10-22 VITALS
DIASTOLIC BLOOD PRESSURE: 68 MMHG | SYSTOLIC BLOOD PRESSURE: 102 MMHG | WEIGHT: 168 LBS | HEART RATE: 60 BPM | BODY MASS INDEX: 25.54 KG/M2

## 2018-10-22 VITALS
BODY MASS INDEX: 25.29 KG/M2 | OXYGEN SATURATION: 96 % | HEART RATE: 129 BPM | WEIGHT: 166.9 LBS | RESPIRATION RATE: 16 BRPM | DIASTOLIC BLOOD PRESSURE: 62 MMHG | SYSTOLIC BLOOD PRESSURE: 111 MMHG | TEMPERATURE: 98.9 F | HEIGHT: 68 IN

## 2018-10-22 DIAGNOSIS — I25.10 CORONARY ARTERY DISEASE INVOLVING NATIVE CORONARY ARTERY OF NATIVE HEART WITHOUT ANGINA PECTORIS: ICD-10-CM

## 2018-10-22 DIAGNOSIS — M48.56XA COLLAPSED VERTEBRA, NOT ELSEWHERE CLASSIFIED, LUMBAR REGION, INITIAL ENCOUNTER FOR FRACTURE (H): ICD-10-CM

## 2018-10-22 DIAGNOSIS — S32.010A CLOSED COMPRESSION FRACTURE OF FIRST LUMBAR VERTEBRA, INITIAL ENCOUNTER: ICD-10-CM

## 2018-10-22 DIAGNOSIS — E78.2 MIXED HYPERLIPIDEMIA: ICD-10-CM

## 2018-10-22 DIAGNOSIS — K52.831 COLLAGENOUS COLITIS: ICD-10-CM

## 2018-10-22 DIAGNOSIS — I10 BENIGN ESSENTIAL HYPERTENSION: ICD-10-CM

## 2018-10-22 DIAGNOSIS — G25.81 RESTLESS LEG SYNDROME: ICD-10-CM

## 2018-10-22 DIAGNOSIS — I48.0 PAROXYSMAL ATRIAL FIBRILLATION (H): ICD-10-CM

## 2018-10-22 DIAGNOSIS — M48.56XD NON-TRAUMATIC COMPRESSION FRACTURE OF L1 LUMBAR VERTEBRA WITH ROUTINE HEALING, SUBSEQUENT ENCOUNTER: Primary | ICD-10-CM

## 2018-10-22 DIAGNOSIS — E63.9 NUTRITIONAL DEFICIENCY: ICD-10-CM

## 2018-10-22 LAB
APTT PPP: 40 SEC (ref 22–37)
HGB BLD-MCNC: 13.5 G/DL (ref 13.3–17.7)
INR PPP: 1.08 (ref 0.86–1.14)
PLATELET # BLD AUTO: 365 10E9/L (ref 150–450)

## 2018-10-22 PROCEDURE — 27210905 ZZH KIT CR7

## 2018-10-22 PROCEDURE — 25000125 ZZHC RX 250

## 2018-10-22 PROCEDURE — 25000128 H RX IP 250 OP 636

## 2018-10-22 PROCEDURE — 85730 THROMBOPLASTIN TIME PARTIAL: CPT | Performed by: PHYSICIAN ASSISTANT

## 2018-10-22 PROCEDURE — 99605 MTMS BY PHARM NP 15 MIN: CPT | Performed by: PHARMACIST

## 2018-10-22 PROCEDURE — 25000128 H RX IP 250 OP 636: Performed by: RADIOLOGY

## 2018-10-22 PROCEDURE — 36415 COLL VENOUS BLD VENIPUNCTURE: CPT | Performed by: PHYSICIAN ASSISTANT

## 2018-10-22 PROCEDURE — 40000853 ZZH STATISTIC ANGIOGRAM, STENT, VERTEBRO PLASTY

## 2018-10-22 PROCEDURE — 85610 PROTHROMBIN TIME: CPT | Performed by: PHYSICIAN ASSISTANT

## 2018-10-22 PROCEDURE — 85018 HEMOGLOBIN: CPT | Performed by: PHYSICIAN ASSISTANT

## 2018-10-22 PROCEDURE — 99152 MOD SED SAME PHYS/QHP 5/>YRS: CPT

## 2018-10-22 PROCEDURE — 85049 AUTOMATED PLATELET COUNT: CPT | Performed by: PHYSICIAN ASSISTANT

## 2018-10-22 PROCEDURE — 99607 MTMS BY PHARM ADDL 15 MIN: CPT | Performed by: PHARMACIST

## 2018-10-22 PROCEDURE — 25000125 ZZHC RX 250: Performed by: PHYSICIAN ASSISTANT

## 2018-10-22 RX ORDER — LIDOCAINE 40 MG/G
CREAM TOPICAL
Status: DISCONTINUED | OUTPATIENT
Start: 2018-10-22 | End: 2018-10-22 | Stop reason: HOSPADM

## 2018-10-22 RX ORDER — POLYETHYLENE GLYCOL 3350 17 G/17G
1 POWDER, FOR SOLUTION ORAL DAILY PRN
COMMUNITY
End: 2020-07-09

## 2018-10-22 RX ORDER — NALOXONE HYDROCHLORIDE 0.4 MG/ML
.1-.4 INJECTION, SOLUTION INTRAMUSCULAR; INTRAVENOUS; SUBCUTANEOUS
Status: DISCONTINUED | OUTPATIENT
Start: 2018-10-22 | End: 2018-10-22 | Stop reason: HOSPADM

## 2018-10-22 RX ORDER — FLUMAZENIL 0.1 MG/ML
0.2 INJECTION, SOLUTION INTRAVENOUS
Status: DISCONTINUED | OUTPATIENT
Start: 2018-10-22 | End: 2018-10-22 | Stop reason: HOSPADM

## 2018-10-22 RX ORDER — FENTANYL CITRATE 50 UG/ML
INJECTION, SOLUTION INTRAMUSCULAR; INTRAVENOUS
Status: COMPLETED
Start: 2018-10-22 | End: 2018-10-22

## 2018-10-22 RX ORDER — CLINDAMYCIN PHOSPHATE 900 MG/50ML
900 INJECTION, SOLUTION INTRAVENOUS
Status: COMPLETED | OUTPATIENT
Start: 2018-10-22 | End: 2018-10-22

## 2018-10-22 RX ORDER — LIDOCAINE HYDROCHLORIDE 10 MG/ML
INJECTION, SOLUTION INFILTRATION; PERINEURAL
Status: DISCONTINUED
Start: 2018-10-22 | End: 2018-10-22 | Stop reason: HOSPADM

## 2018-10-22 RX ORDER — LIDOCAINE HYDROCHLORIDE 10 MG/ML
1-30 INJECTION, SOLUTION EPIDURAL; INFILTRATION; INTRACAUDAL; PERINEURAL
Status: COMPLETED | OUTPATIENT
Start: 2018-10-22 | End: 2018-10-22

## 2018-10-22 RX ORDER — FENTANYL CITRATE 50 UG/ML
25-50 INJECTION, SOLUTION INTRAMUSCULAR; INTRAVENOUS EVERY 5 MIN PRN
Status: DISCONTINUED | OUTPATIENT
Start: 2018-10-22 | End: 2018-10-22 | Stop reason: HOSPADM

## 2018-10-22 RX ORDER — BUDESONIDE 3 MG/1
3 CAPSULE, COATED PELLETS ORAL 3 TIMES DAILY
COMMUNITY
End: 2018-11-20

## 2018-10-22 RX ADMIN — LIDOCAINE HYDROCHLORIDE 6 ML: 10 INJECTION, SOLUTION EPIDURAL; INFILTRATION; INTRACAUDAL; PERINEURAL at 08:48

## 2018-10-22 RX ADMIN — CLINDAMYCIN PHOSPHATE 900 MG: 900 INJECTION, SOLUTION INTRAVENOUS at 08:13

## 2018-10-22 RX ADMIN — MIDAZOLAM HYDROCHLORIDE 0.5 MG: 1 INJECTION, SOLUTION INTRAMUSCULAR; INTRAVENOUS at 09:02

## 2018-10-22 RX ADMIN — SODIUM CHLORIDE 300 ML: 9 INJECTION, SOLUTION INTRAVENOUS at 09:15

## 2018-10-22 RX ADMIN — LIDOCAINE HYDROCHLORIDE 6 ML: 10 INJECTION, SOLUTION INFILTRATION; PERINEURAL at 08:48

## 2018-10-22 RX ADMIN — FENTANYL CITRATE 25 MCG: 50 INJECTION INTRAMUSCULAR; INTRAVENOUS at 08:50

## 2018-10-22 RX ADMIN — MIDAZOLAM HYDROCHLORIDE 0.5 MG: 1 INJECTION, SOLUTION INTRAMUSCULAR; INTRAVENOUS at 08:37

## 2018-10-22 RX ADMIN — MIDAZOLAM HYDROCHLORIDE 0.5 MG: 1 INJECTION, SOLUTION INTRAMUSCULAR; INTRAVENOUS at 08:48

## 2018-10-22 RX ADMIN — FENTANYL CITRATE 25 MCG: 50 INJECTION INTRAMUSCULAR; INTRAVENOUS at 08:38

## 2018-10-22 RX ADMIN — FENTANYL CITRATE 25 MCG: 50 INJECTION INTRAMUSCULAR; INTRAVENOUS at 09:02

## 2018-10-22 NOTE — PATIENT INSTRUCTIONS
Recommendations from today's MTM visit:                                                    MTM (medication therapy management) is a service provided by a clinical pharmacist designed to help you get the most of out of your medicines.   Today we reviewed what your medicines are for, how to know if they are working, that your medicines are safe and how to make your medicine regimen as easy as possible.     1.  If you do feel you need something additional for constipation, you can add in the Miralax.    2.  Go ahead and start taking calcium/vitamin D 600mg/400 international units twice daily.    3.  Schedule the DEXA scan and then call to schedule a follow-up appointment with us once you've had the scan.    Next MTM visit: Following DEXA scan    To schedule another MTM appointment, please call the clinic directly or you may call the MTM scheduling line at [phone] or toll-free at [phone].     My Clinical Pharmacist's contact information:                                                      It was a pleasure seeing you today!  Please feel free to contact me with any questions or concerns you have.      Amparo Antunez, PharmD, [facility]  Medication Therapy Management Provider  Pager: [phone]     Opal Chanel PharmD  Medication Therapy Management Resident  Pager: [phone]    You may receive a survey about the MTM services you received.  I would appreciate your feedback to help me serve you better in the future. Please fill it out and return it when you can. Your comments will be anonymous.

## 2018-10-22 NOTE — PROGRESS NOTES
"SUBJECTIVE/OBJECTIVE:                           Efrem Ramos is a 73 year old male coming in for an initial visit for Medication Therapy Management.  He was referred to me from Dr. Paige.  Efrem is joined by his wife, Mariajose, for our visit today.    Chief Complaint: Discuss bone health.  Mariajose also has some questions about budesonide.    Allergies/ADRs: Reviewed in Epic  Tobacco: History of tobacco dependence - quit 2013  Alcohol: not currently using, but says \"I enjoy it but haven't had any for about a month now.\"  Caffeine: 2 sodas/day, which has also been reduced  Activity: Currently limited due to back pain    PMH: Reviewed in Epic    Medication Adherence/Access:  Patient uses pill box(es).  Patient takes medications 3 time(s) per day.   Per patient, misses medication 0 times per week (maybe once every 3 weeks or so he'll miss the PM dose because he falls asleep).    Compression Fracture: Pt recently had compression fracture.  Was recommended he get DEXA scan and f/up with me to discuss medication options.  They have not yet scheduled the DEXA scan.  He underwent a vertebroplasty this morning, he reports his back is hurting but they said this would likely be the case for a few days/weeks.  He has hydrocodone/APAP available for use, but has not been using (he took one dose since receiving it).  He does take APAP as needed which is helpful.  Last Vitamin D level 10/9/18 = 28 micrograms/L.  He does not eat much dairy because it tends to upset his stomach.  Estimated Creatinine Clearance: 72.7 mL/min (based on Cr of 0.94).     Collagenous Colitis: This was diagnosed in ED on 9/5 - he was started on budesonide with plan to continue for 8 weeks.  He reports he's no longer having diarrhea, in fact is having more constipation so is taking senna/docusate 2 tablets BID.  He's having bowel movements every other day or so.  They do also have Miralax available at home if needed but he hasn't been taking this.  He " "was also asked to f/up with [facility], they have an appt scheduled in November. He has had colitis since he was in high school but this is his first flare.    Hypertension/A. Fib/CAD: Current medications include ASA 81mg daily, Eliquis 5mg BID and metoprolol ER 12.5mg daily.  Eliquis was on hold for procedure today - he'll resume Eliquis tonight.  Patient does not self-monitor BP.  Patient reports no current medication side effects.     Hyperlipidemia: Current therapy includes atorvastatin 40mg once daily.  Pt reports no significant myalgias or other side effects.    RLS:  He takes gabapentin 600mg at bedtime for RLS.  He reports he was having a procedure of some sort and they noticed his legs were quite restless.  He's been taking gabapentin since this time.  He denies side effects.  He does find he doesn't sleep very well if he misses the dose of gabapentin.  Last ferritin 9/8/18 = 584ng/mL.    Supplements:  Current supplements include Ocuvite BID for the \"beginnings of macular degeneration\" and Vitamin B12 2000mcg once weekly (Sundays).  Last Vitamin B12 level on file in EPIC = 846pg/mL.    Today's Vitals: /68  Pulse 60  Wt 168 lb (76.2 kg)  BMI 25.54 kg/m2    ASSESSMENT:                             Current medications were reviewed today.     Medication Adherence: good, no issues identified    Compression Fracture: Needs improvement. Patient would benefit from having a DEXA scan done to confirm osteoporosis diagnosis. Patient would benefit from starting a Calcium/Vitamin D supplement given that his Vitamin D level (10/9/18) was just below goal of 30 ug/L and he admits to low calcium intake in his diet. If diagnosed with osteoporosis, patient would likely benefit from an injection option such as Reclast or Prolia given that he has esophageal issues with prior history of vocal cord cancer.     Collagenous colitis: Needs improvement. Defer to [facility] to discuss duration of budesonide treatment. However, it would " be beneficial to minimize long-term use as much as possible to preserve bone health and other complications. Although minimal opioid use, he is having worsened constipation since starting budesonide. Patient would benefit from adding in Miralax as appropriate to help with the constipation.    Hypertension/A. Fib/CAD: Stable.    Hyperlipidemia: Stable.    RLS: Stable. In the future, if he does not continue to find this medication helpful could consider reducing the dose or discontinuation. It was unclear how helpful this medication truly was given his current nighttime pain from the fracture.     Supplements: Stable.    PLAN:                            1.  If you do feel you need something additional for constipation, you can add in the Miralax.  2.  Go ahead and start taking calcium/vitamin D 600mg/400 international units twice daily.  3.  Schedule the DEXA scan and then call to schedule a follow-up appointment with us once you've had the scan.    I spent 60 minutes with this patient today. A copy of the visit note was provided to the patient's primary care provider.    Will follow up within the next month following DEXA scan.    The patient was given a summary of these recommendations as an after visit summary.     Kristen Weiler, PharmD IV Student    Amparo Antunez, PharmD, BCACP  Medication Therapy Management Provider  Pager: [phone]

## 2018-10-22 NOTE — MR AVS SNAPSHOT
After Visit Summary   10/22/2018    Efrem Ramos    MRN: [id number]           Patient Information     Date Of Birth          1945        Visit Information        Provider Department      10/22/2018 2:00 PM Amparo Antunez, [facility] MTM        Care Instructions    Recommendations from today's MTM visit:                                                    MTM (medication therapy management) is a service provided by a clinical pharmacist designed to help you get the most of out of your medicines.   Today we reviewed what your medicines are for, how to know if they are working, that your medicines are safe and how to make your medicine regimen as easy as possible.     1.  If you do feel you need something additional for constipation, you can add in the Miralax.    2.  Go ahead and start taking calcium/vitamin D 600mg/400 international units twice daily.    3.  Schedule the DEXA scan and then call to schedule a follow-up appointment with us once you've had the scan.    Next MTM visit: Following DEXA scan    To schedule another MTM appointment, please call the clinic directly or you may call the MTM scheduling line at [phone] or toll-free at [phone].     My Clinical Pharmacist's contact information:                                                      It was a pleasure seeing you today!  Please feel free to contact me with any questions or concerns you have.      Amparo Antunez PharmD, [facility]  Medication Therapy Management Provider  Pager: [phone]     Opal Chanel PharmD  Medication Therapy Management Resident  Pager: [phone]    You may receive a survey about the MTM services you received.  I would appreciate your feedback to help me serve you better in the future. Please fill it out and return it when you can. Your comments will be anonymous.                     Follow-ups after your visit        Your next 10 appointments already scheduled     Nov 20, 2018   2:30 PM CST   Return Visit with FABI Griffith CNP   [facility] ([facility])    [address] [place] [address]  [facility] [address] [phone] OPT 2            Dec 27, 2018  1:30 PM CST   Return Visit with  Oncology Nurse   [facility] ([facility])    [facility]  [address]  [facility] [address]   [phone]            Jan 03, 2019  2:00 PM CST   Return Visit with Shae Aldana MD   [facility] ([facility])    [facility]  [address]  [facility] [address]   [phone]            Aug 14, 2019  1:30 PM CDT   Return Sleep Patient with Ron Pacheco MD   [facility] ([facility])    [address]  [address]  [facility] [address] [phone]              Who to contact     If you have questions or need follow up information about today's clinic visit or your schedule please contact [facility] directly at [phone].  Normal or non-critical lab and imaging results will be communicated to you by AirWatchhart, letter or phone within 4 business days after the clinic has received the results. If you do not hear from us within 7 days, please contact the clinic through AirWatchhart or phone. If you have a critical or abnormal lab result, we will notify you by phone as soon as possible.  Submit refill requests through OneFineMeal or call your pharmacy and they will forward the refill request to us. Please allow 3 business days for your refill to be completed.          Additional Information About Your Visit        OneFineMeal Information     OneFineMe[employer] gives you secure access to your electronic health record. If you see a primary care provider, you can also send messages to your care team and make appointments. If you have questions, please call your primary care clinic.  If you do not have a  primary care provider, please call [phone] and they will assist you.        Care EveryWhere ID     This is your Care EveryWhere ID. This could be used by other organizations to access your [facility] medical records  [id number]        Your Vitals Were     Pulse BMI (Body Mass Index)                60 25.54 kg/m2           Blood Pressure from Last 3 Encounters:   10/22/18 102/68   10/22/18 111/62   10/15/18 110/74    Weight from Last 3 Encounters:   10/22/18 168 lb (76.2 kg)   10/22/18 166 lb 14.4 oz (75.7 kg)   10/15/18 174 lb (78.9 kg)              Today, you had the following     No orders found for display       Primary Care Provider Office Phone # Fax #    Danny Paige [phone] [address]  [place] [address]        Equal Access to Services     [facility]: Hadii aad ku hadasho Soomaali, waaxda luqadaha, qaybta kaalmada adeegyada, waxay idiin haymiryamn yris olvera . So [facility] [phone].    ATENCIÓN: Si habla español, tiene a palomo disposición servicios gratuitos de asistencia lingüística. Ummame al [phone].    We comply with applicable federal civil rights laws and [place] laws. We do not discriminate on the basis of race, color, national origin, age, disability, sex, sexual orientation, or gender identity.            Thank you!     Thank you for choosing [facility]  for your care. Our goal is always to provide you with excellent care. Hearing back from our patients is one way we can continue to improve our services. Please take a few minutes to complete the written survey that you may receive in the mail after your visit with us. Thank you!             Your Updated Medication List - Protect others around you: Learn how to safely use, store and throw away your medicines at www.disposemymeds.org.          This list is accurate as of 10/22/18  2:50 PM.  Always use your most recent med list.                   Brand Name Dispense  Instructions for use Diagnosis    acetaminophen 325 MG tablet    TYLENOL     Take 325-650 mg by mouth every 6 hours as needed for mild pain        apixaban ANTICOAGULANT 5 MG tablet    ELIQUIS    180 tablet    Take 1 tablet (5 mg) by mouth 2 times daily    Atrial fibrillation and flutter (H)       ASPIRIN PO      Take 81 mg by mouth every evening        atorvastatin 40 MG tablet    LIPITOR     Take 40 mg by mouth daily    MGUS (monoclonal gammopathy of unknown significance)       budesonide 3 MG EC capsule    ENTOCORT EC     Take 3 mg by mouth 3 times daily        calcium carbonate 600 mg-vitamin D 400 units 600-400 MG-UNIT per tablet    CALTRATE     Take 1 tablet by mouth 2 times daily        Cyanocobalamin 2000 MCG Tabs      Take 2,000 mcg by mouth every 7 days On Sundays        gabapentin 300 MG capsule    NEURONTIN    60 capsule    Take 2 capsules (600 mg) by mouth every evening    Restless legs syndrome (RLS)       HYDROcodone-acetaminophen 5-325 MG per tablet    NORCO    18 tablet    Take 1 tablet by mouth every 4 hours as needed for pain    Non-traumatic compression fracture of L1 lumbar vertebra with routine healing, subsequent encounter       metoprolol succinate 25 MG 24 hr tablet    TOPROL-XL    45 tablet    Take 0.5 tablets (12.5 mg) by mouth daily    Nonrheumatic aortic valve stenosis       multivitamin Tabs tablet      Take 1 tablet by mouth 2 times daily        polyethylene glycol powder    MIRALAX/GLYCOLAX     Take 1 capful by mouth daily as needed for constipation        senna-docusate 8.6-50 MG per tablet    SENOKOT-S;PERICOLACE    120 tablet    Take 2 tablets by mouth 2 times daily    Collagenous colitis

## 2018-10-22 NOTE — DISCHARGE INSTRUCTIONS
Vertebroplasty Discharge Instructions    After you go home:      Have an adult stay with you until tomorrow.    Drink extra fluids for 2 days.    You may resume your normal diet.    No smoking       For 24 hours - due to the sedation you received:    Relax and take it easy.    Do NOT make any important or legal decisions.    Do NOT drive or operate machines at home or at work.    Do NOT drink alcohol.    Care of Back Puncture Site:      Keep the bandages on until your incision heals. Make sure the bandages are clean and dry.    You may take a shower tomorrow.     Wait three days before swimming or taking a bath.    Activity:      We urge you to lie down for the rest of the day. You may get up to use the toilet.    You may drive tomorrow.     Slowly increase your activity level. Walking will help you get stronger.     If you cannot increase your activity or you are not getting stronger, call your primary care provider.    Bleeding:       If you start bleeding from the site in your back, lie down and press gently on the site for 10 minutes. You will need assistance to do this.    If bleeding does not stop - call your primary care provider as soon as you can.       Call 911 right away if you have heavy bleeding that does not stop.      Medicines:      Take your medications, including blood thinners, unless your provider tells you not to.    If you take aspirin, Coumadin (Warfarin) or Plavix - you may start taking it this evening if no bleeding present.    If you take Coumadin (Warfarin), have your INR checked by your provider in 3 to 5 days. Call your clinic to schedule this.    If you have stopped any medicines, check with your provider about when to restart them.    Follow Up Appointments:      Follow up with your primary care provider as needed.    Call the clinic if:      Back pain that gets worse.    You have increased pain or a large or growing hard lump around the site.    The site is red, swollen, hot or  tender.    Blood or fluid is draining from the site.    You have chills or a fever greater than 101 F (38 C).    Any questions or concerns.    Other Information:      You may have bruising and new soreness in your back for two to three days. Take Tylenol (acetaminophen) or the pain medicine your provider prescribed.    You may feel pain relief right away, or it may take several days. If your pain does not improve, call your provider.    As your pain lessens and you become more active, you will need to be careful. You may have a higher risk for broken bones in the spine.    If you have osteoporosis (brittle bones) - talk to your provider about a treatment plan. This may include:        Medicine to strengthen your bones (such as Fosamax or Actonel).      Calcium tablets, along with vitamins D and C.      Exercise to increase strength and balance.      Physical therapy, if advised by your provider.      Bone scans (called DEXA scans) to check your bone density.    If you have questions or concerns, call:    MARQUEZ Penny @ [facility]     @ [phone] during business hours

## 2018-10-22 NOTE — IP AVS SNAPSHOT
[place] [address]    [place] [address]    Phone:  [phone]                                       After Visit Summary   10/22/2018    Efrem Ramos    MRN: [id number]           After Visit Summary Signature Page     I have received my discharge instructions, and my questions have been answered. I have discussed any challenges I see with this plan with the nurse or doctor.    ..........................................................................................................................................  Patient/Patient Representative Signature      ..........................................................................................................................................  Patient Representative Print Name and Relationship to Patient    ..................................................               ................................................  Date                                   Time    ..........................................................................................................................................  Reviewed by Signature/Title    ...................................................              ..............................................  Date                                               Time          22EPIC Rev 08/18

## 2018-10-22 NOTE — PROGRESS NOTES
Completed over two hours bedrest. Dressing on back C/D/I. Dangled to eat lunch ,tolerated well. BP currently 111/62, HR 79. Wife at bedside. Will prepare for discharge. Instructions were reviewed earlier by Jamie ZAYAS.

## 2018-10-22 NOTE — IR NOTE
Interventional Radiology Intra-procedural Nursing Note    Patient Name: Efrem Ramos  Medical Record Number: [id number]  Today's Date: October 22, 2018    Start Time: 0845   End of procedure time: 0907  Procedure: vertebroplasty L1  Report given to: care suites RN  Time pt departs:  0930  : n/a    Other Notes: patient tolerated procedure well. SBP 70-80's during and post-procedure. Ns bolus of 300ml initiated after procedre per Dr. Valdes who also remained present and evaluated patient. Pt remains on 2 liters o2 nasal cannula with o2 sats 100% and respirations regular and unlabored. Remained with patient for a few minutes until he was more alert and responsive. Left IR suite in stable condition, BP up to 87/61, transferred to care suites where report was given to Georgina Ayala.  1.5mg Versed and 75mcg Fentanyl were given for sedation.    Tasneem Tamayo RN

## 2018-10-22 NOTE — IP AVS SNAPSHOT
MRN:[id number]                      After Visit Summary   10/22/2018    Efrem Ramos    MRN: [id number]           Visit Information        Department      10/22/2018  6:26 AM [facility]          Review of your medicines      UNREVIEWED medicines. Ask your doctor about these medicines        Dose / Directions    acetaminophen 325 MG tablet   Commonly known as:  TYLENOL        Dose:  325-650 mg   Take 325-650 mg by mouth every 6 hours as needed for mild pain   Refills:  0       apixaban ANTICOAGULANT 5 MG tablet   Commonly known as:  ELIQUIS   Used for:  Atrial fibrillation and flutter (H)        Dose:  5 mg   Take 1 tablet (5 mg) by mouth 2 times daily   Quantity:  180 tablet   Refills:  2       ASPIRIN PO        Dose:  81 mg   Take 81 mg by mouth every evening   Refills:  0       atorvastatin 40 MG tablet   Commonly known as:  LIPITOR   Used for:  MGUS (monoclonal gammopathy of unknown significance)        Refills:  3       budesonide 9 MG 24 hr tablet   Commonly known as:  UCERIS   Used for:  Collagenous colitis        Dose:  9 mg   Take 1 tablet (9 mg) by mouth daily   Quantity:  30 tablet   Refills:  1       Cyanocobalamin 2000 MCG Tabs        Dose:  2000 mcg   Take 2,000 mcg by mouth every 7 days On Sundays   Refills:  0       enoxaparin 100 MG/ML injection   Commonly known as:  LOVENOX   Used for:  Paroxysmal atrial fibrillation (H)        Dose:  1 mg/kg   Inject 0.79 mLs (79 mg) Subcutaneous every 12 hours   Quantity:  5 Syringe   Refills:  0       gabapentin 300 MG capsule   Commonly known as:  NEURONTIN   Used for:  Restless legs syndrome (RLS)        Dose:  600 mg   Take 2 capsules (600 mg) by mouth every evening   Quantity:  60 capsule   Refills:  5       HYDROcodone-acetaminophen 5-325 MG per tablet   Commonly known as:  NORCO   Used for:  Non-traumatic compression fracture of L1 lumbar vertebra with routine healing, subsequent encounter        Dose:  1 tablet    Take 1 tablet by mouth every 4 hours as needed for pain   Quantity:  18 tablet   Refills:  0       loperamide 2 MG capsule   Commonly known as:  IMODIUM   Used for:  Diarrhea, unspecified type        Dose:  4 mg   Take 2 capsules (4 mg) by mouth 4 times daily as needed for diarrhea   Quantity:  20 capsule   Refills:  0       metoprolol succinate 25 MG 24 hr tablet   Commonly known as:  TOPROL-XL   Used for:  Nonrheumatic aortic valve stenosis        Dose:  12.5 mg   Take 0.5 tablets (12.5 mg) by mouth daily   Quantity:  45 tablet   Refills:  2       multivitamin Tabs tablet        Dose:  1 tablet   Take 1 tablet by mouth 2 times daily   Refills:  0       senna-docusate 8.6-50 MG per tablet   Commonly known as:  SENOKOT-S;PERICOLACE   Used for:  Collagenous colitis        Dose:  2 tablet   Take 2 tablets by mouth 2 times daily   Quantity:  120 tablet   Refills:  3       simethicone 80 MG chewable tablet   Commonly known as:  MYLICON   Used for:  Flatulence, eructation and gas pain        Dose:  80 mg   Take 1 tablet (80 mg) by mouth every 6 hours as needed for cramping   Quantity:  180 tablet   Refills:  0                Protect others around you: Learn how to safely use, store and throw away your medicines at www.disposemymeds.org.         Follow-ups after your visit        Your next 10 appointments already scheduled     Oct 22, 2018  2:00 PM CDT   Office Visit with Amparo Antunez ABNER   [facility] ([facility])    [address] [place] [address] [phone]           Bring a current list of meds and any records pertaining to this visit. For Physicals, please bring immunization records and any forms needing to be filled out. Please arrive 10 minutes early to complete paperwork.            Nov 20, 2018  2:30 PM CST   Return Visit with FABI Griffith [facility] ([facility])    [address]  [address]  [place] [address]   [phone] OPT 2            Dec 27, 2018  1:30 PM CST   Return Visit with  Oncology Nurse   [facility] ([facility])    [facility]  [address]  [place] [address]   [phone]            Jan 03, 2019  2:00 PM CST   Return Visit with Shae Aldana MD   [facility] ([facility])    [facility] [place]  [address]  [place] [address]   [phone]            Aug 14, 2019  1:30 PM CDT   Return Sleep Patient with Ron Pacheco MD   [facility] ([facility])    [address]  [address]  [place] [address]   [phone]               Care Instructions        Further instructions from your care team       Vertebroplasty Discharge Instructions    After you go home:      Have an adult stay with you until tomorrow.    Drink extra fluids for 2 days.    You may resume your normal diet.    No smoking       For 24 hours - due to the sedation you received:    Relax and take it easy.    Do NOT make any important or legal decisions.    Do NOT drive or operate machines at home or at work.    Do NOT drink alcohol.    Care of Back Puncture Site:      Keep the bandages on until your incision heals. Make sure the bandages are clean and dry.    You may take a shower tomorrow.     Wait three days before swimming or taking a bath.    Activity:      We urge you to lie down for the rest of the day. You may get up to use the toilet.    You may drive tomorrow.     Slowly increase your activity level. Walking will help you get stronger.     If you cannot increase your activity or you are not getting stronger, call your primary care provider.    Bleeding:       If you start bleeding from the site in your back, lie down and press gently on the site for 10 minutes. You will need assistance to do this.    If bleeding does not stop - call your  primary care provider as soon as you can.       Call 911 right away if you have heavy bleeding that does not stop.      Medicines:      Take your medications, including blood thinners, unless your provider tells you not to.    If you take aspirin, Coumadin (Warfarin) or Plavix - you may start taking it this evening if no bleeding present.    If you take Coumadin (Warfarin), have your INR checked by your provider in 3 to 5 days. Call your clinic to schedule this.    If you have stopped any medicines, check with your provider about when to restart them.    Follow Up Appointments:      Follow up with your primary care provider as needed.    Call the clinic if:      Back pain that gets worse.    You have increased pain or a large or growing hard lump around the site.    The site is red, swollen, hot or tender.    Blood or fluid is draining from the site.    You have chills or a fever greater than 101 F (38 C).    Any questions or concerns.    Other Information:      You may have bruising and new soreness in your back for two to three days. Take Tylenol (acetaminophen) or the pain medicine your provider prescribed.    You may feel pain relief right away, or it may take several days. If your pain does not improve, call your provider.    As your pain lessens and you become more active, you will need to be careful. You may have a higher risk for broken bones in the spine.    If you have osteoporosis (brittle bones) - talk to your provider about a treatment plan. This may include:        Medicine to strengthen your bones (such as Fosamax or Actonel).      Calcium tablets, along with vitamins D and C.      Exercise to increase strength and balance.      Physical therapy, if advised by your provider.      Bone scans (called DEXA scans) to check your bone density.    If you have questions or concerns, call:    MARQUEZ Penny @ [facility]     @ [phone] during business hours       Additional Information About Your Visit       "  [employer] Information     [employer] gives you secure access to your electronic health record. If you see a primary care provider, you can also send messages to your care team and make appointments. If you have questions, please call your primary care clinic.  If you do not have a primary care provider, please call [phone] and they will assist you.        Care EveryWhere ID     This is your Care EveryWhere ID. This could be used by other organizations to access your [facility] medical records  [id number]        Your Vitals Were     Blood Pressure Pulse Temperature Respirations Height Weight    93/63 129 98.9  F (37.2  C) (Oral) 18 1.727 m (5' 8\") 75.7 kg (166 lb 14.4 oz)    Pulse Oximetry BMI (Body Mass Index)                98% 25.38 kg/m2           Primary Care Provider Office Phone # Fax #    Danny Paige [phone]      Equal Access to Services     JAMEEL HARRIS AH: Hadii angle ku hadasho Soomaali, waaxda luqadaha, qaybta kaalmada adeegyada, alfonzo olvera . So [facility] [phone].    ATENCIÓN: Si habla español, tiene a palomo disposición servicios gratuitos de asistencia lingüística. Llame al [phone].    We comply with applicable federal civil rights laws and [place] laws. We do not discriminate on the basis of race, color, national origin, age, disability, sex, sexual orientation, or gender identity.            Thank you!     Thank you for choosing [facility] for your care. Our goal is always to provide you with excellent care. Hearing back from our patients is one way we can continue to improve our services. Please take a few minutes to complete the written survey that you may receive in the mail after you visit with us. Thank you!             Medication List: This is a list of all your medications and when to take them. Check marks below indicate your daily home schedule. Keep this list as a reference.      Medications           Morning Afternoon Evening " Bedtime As Needed    acetaminophen 325 MG tablet   Commonly known as:  TYLENOL   Take 325-650 mg by mouth every 6 hours as needed for mild pain                                apixaban ANTICOAGULANT 5 MG tablet   Commonly known as:  ELIQUIS   Take 1 tablet (5 mg) by mouth 2 times daily                                ASPIRIN PO   Take 81 mg by mouth every evening                                atorvastatin 40 MG tablet   Commonly known as:  LIPITOR                                budesonide 9 MG 24 hr tablet   Commonly known as:  UCERIS   Take 1 tablet (9 mg) by mouth daily                                Cyanocobalamin 2000 MCG Tabs   Take 2,000 mcg by mouth every 7 days On Sundays                                enoxaparin 100 MG/ML injection   Commonly known as:  LOVENOX   Inject 0.79 mLs (79 mg) Subcutaneous every 12 hours                                gabapentin 300 MG capsule   Commonly known as:  NEURONTIN   Take 2 capsules (600 mg) by mouth every evening                                HYDROcodone-acetaminophen 5-325 MG per tablet   Commonly known as:  NORCO   Take 1 tablet by mouth every 4 hours as needed for pain                                loperamide 2 MG capsule   Commonly known as:  IMODIUM   Take 2 capsules (4 mg) by mouth 4 times daily as needed for diarrhea                                metoprolol succinate 25 MG 24 hr tablet   Commonly known as:  TOPROL-XL   Take 0.5 tablets (12.5 mg) by mouth daily                                multivitamin Tabs tablet   Take 1 tablet by mouth 2 times daily                                senna-docusate 8.6-50 MG per tablet   Commonly known as:  SENOKOT-S;PERICOLACE   Take 2 tablets by mouth 2 times daily                                simethicone 80 MG chewable tablet   Commonly known as:  MYLICON   Take 1 tablet (80 mg) by mouth every 6 hours as needed for cramping

## 2018-10-22 NOTE — BRIEF OP NOTE
RADIOLOGY PROCEDURE NOTE  Patient name: Efrem Ramos  MRN: [id number]  : 1945    Pre-procedure diagnosis: L1 compression fx  Post-procedure diagnosis: Same    Procedure Date/Time: 2018  9:19 AM  Procedure: l1 vertebroplasty  Estimated blood loss: None  Specimen(s) collected with description: none  The patient tolerated the procedure well with no immediate complications.  Significant findings:none    See imaging dictation for procedural details.    Provider name: Karson Valdes  Assistant(s):None

## 2018-10-25 DIAGNOSIS — D47.2 MGUS (MONOCLONAL GAMMOPATHY OF UNKNOWN SIGNIFICANCE): ICD-10-CM

## 2018-10-25 DIAGNOSIS — E78.5 HLD (HYPERLIPIDEMIA): Primary | ICD-10-CM

## 2018-10-25 RX ORDER — ATORVASTATIN CALCIUM 40 MG/1
40 TABLET, FILM COATED ORAL DAILY
Qty: 90 TABLET | Refills: 2 | Status: SHIPPED | OUTPATIENT
Start: 2018-10-25 | End: 2019-07-31

## 2018-10-25 NOTE — ADDENDUM NOTE
Addended by: PENFIELD, LYNETTE EILEEN BARTELT on: 10/25/2018 01:27 PM     Modules accepted: Orders

## 2018-10-29 ENCOUNTER — MYC MEDICAL ADVICE (OUTPATIENT)
Dept: FAMILY MEDICINE | Facility: CLINIC | Age: 73
End: 2018-10-29

## 2018-10-29 DIAGNOSIS — M80.08XA AGE-RELATED OSTEOPOROSIS WITH CURRENT PATHOLOGICAL FRACTURE OF VERTEBRA, INITIAL ENCOUNTER (H): ICD-10-CM

## 2018-10-29 DIAGNOSIS — S32.010D CLOSED COMPRESSION FRACTURE OF L1 LUMBAR VERTEBRA WITH ROUTINE HEALING, SUBSEQUENT ENCOUNTER: ICD-10-CM

## 2018-10-29 DIAGNOSIS — M80.00XD AGE-RELATED OSTEOPOROSIS WITH CURRENT PATHOLOGICAL FRACTURE WITH ROUTINE HEALING, SUBSEQUENT ENCOUNTER: Primary | ICD-10-CM

## 2018-10-29 NOTE — TELEPHONE ENCOUNTER
To PCP: Please see [employer] message.     Order for DEXA is pended in     Thank you,   Pauline LEVINE RN

## 2018-10-31 ENCOUNTER — HOSPITAL ENCOUNTER (OUTPATIENT)
Dept: BONE DENSITY | Facility: CLINIC | Age: 73
End: 2018-10-31
Attending: INTERNAL MEDICINE
Payer: MEDICARE

## 2018-10-31 ENCOUNTER — HOSPITAL ENCOUNTER (OUTPATIENT)
Dept: BONE DENSITY | Facility: CLINIC | Age: 73
Discharge: HOME | End: 2018-10-31
Attending: INTERNAL MEDICINE | Admitting: INTERNAL MEDICINE
Payer: MEDICARE

## 2018-10-31 DIAGNOSIS — M80.08XA AGE-RELATED OSTEOPOROSIS WITH CURRENT PATHOLOGICAL FRACTURE OF VERTEBRA, INITIAL ENCOUNTER (H): ICD-10-CM

## 2018-10-31 DIAGNOSIS — M80.00XD AGE-RELATED OSTEOPOROSIS WITH CURRENT PATHOLOGICAL FRACTURE WITH ROUTINE HEALING, SUBSEQUENT ENCOUNTER: ICD-10-CM

## 2018-10-31 DIAGNOSIS — S32.010D CLOSED COMPRESSION FRACTURE OF L1 LUMBAR VERTEBRA WITH ROUTINE HEALING, SUBSEQUENT ENCOUNTER: ICD-10-CM

## 2018-10-31 PROCEDURE — 77080 DXA BONE DENSITY AXIAL: CPT

## 2018-10-31 PROCEDURE — 77081 DXA BONE DENSITY APPENDICULR: CPT

## 2018-11-02 NOTE — PROGRESS NOTES
Nik Topete,    I have had the opportunity to review your recent results and an interpretation is as follows:  Your bone density scan shows evidence of osteoporosis and I would recommend we plan to discuss with medical therapeutic management the risks/benefits of taking a medication for this     Sincerely,  Danny Paige MD

## 2018-11-10 ENCOUNTER — HEALTH MAINTENANCE LETTER (OUTPATIENT)
Age: 73
End: 2018-11-10

## 2018-11-14 ENCOUNTER — TRANSFERRED RECORDS (OUTPATIENT)
Dept: HEALTH INFORMATION MANAGEMENT | Facility: CLINIC | Age: 73
End: 2018-11-14

## 2018-11-20 ENCOUNTER — OFFICE VISIT (OUTPATIENT)
Dept: CARDIOLOGY | Facility: CLINIC | Age: 73
End: 2018-11-20
Attending: NURSE PRACTITIONER
Payer: MEDICARE

## 2018-11-20 VITALS
BODY MASS INDEX: 24.67 KG/M2 | HEART RATE: 70 BPM | SYSTOLIC BLOOD PRESSURE: 110 MMHG | HEIGHT: 68 IN | DIASTOLIC BLOOD PRESSURE: 70 MMHG | WEIGHT: 162.8 LBS

## 2018-11-20 DIAGNOSIS — I48.91 ATRIAL FIBRILLATION AND FLUTTER (H): Primary | ICD-10-CM

## 2018-11-20 DIAGNOSIS — I10 BENIGN ESSENTIAL HYPERTENSION: ICD-10-CM

## 2018-11-20 DIAGNOSIS — I48.92 ATRIAL FIBRILLATION AND FLUTTER (H): Primary | ICD-10-CM

## 2018-11-20 DIAGNOSIS — R60.0 LOWER EXTREMITY EDEMA: ICD-10-CM

## 2018-11-20 DIAGNOSIS — I25.10 CORONARY ARTERY DISEASE INVOLVING NATIVE CORONARY ARTERY OF NATIVE HEART WITHOUT ANGINA PECTORIS: ICD-10-CM

## 2018-11-20 DIAGNOSIS — E78.2 MIXED HYPERLIPIDEMIA: ICD-10-CM

## 2018-11-20 DIAGNOSIS — I35.0 AORTIC VALVE STENOSIS, ETIOLOGY OF CARDIAC VALVE DISEASE UNSPECIFIED: ICD-10-CM

## 2018-11-20 PROCEDURE — 99214 OFFICE O/P EST MOD 30 MIN: CPT | Performed by: NURSE PRACTITIONER

## 2018-11-20 RX ORDER — LOPERAMIDE HCL 2 MG
2 CAPSULE ORAL 4 TIMES DAILY PRN
COMMUNITY
End: 2020-07-09

## 2018-11-20 NOTE — LETTER
11/20/2018    Danny Paige MD, MD  [address]  [facility] [address]    RE: Efrem Ramos       Dear Colleague,    I had the pleasure of seeing Efrem Ramos in the [facility].    Cardiology Clinic Progress Note  Efrem Ramos MRN# [id number]   YOB: 1945 Age: 73 year old     Reason For Visit:  6 month follow up              History of Presenting Illness:    Efrem Ramos is a pleasant 73 year old patient who comes to the office today for a 6 month follow.    He carries a past medical history significant for stroke with visual changes, atrial fibrillation/flutter, aortic stenosis, acute disseminated  encephalomyelitis, coronary artery disease, sleep apnea, hypertension, hyperlipidemia, colitis, COPD, PVD and MGUS.      He is feeling well on a cardiac standpoint, denies chest pain, shortness of breath, PND, orthopnea, presyncope, syncope,  heart racing, or palpitations. His primary complaint is back pain.  In October,  he was evaluated by his primary doctor for back pain and found to have L1 compression fracture. On 10/22/2018,  he underwent L1 vertebroplasty without complication.     Blood pressure is well controlled at 110/70, managed on low-dose metoprolol  BMP showed a sodium of 138, potassium 4.6, BUN 7, creatinine 0.94, GFR 78  Hemoglobin normal at 13.5, platelet 365  BMI 24.81    Atrial fibrillation is rate controlled on metoprolol, anticoagulated with Eliquis. He denies any evidence of bleeding.   Last echo showed normal LV function 60-65%. Severe mitral annular calcification, mild MR, mild to moderate aortic stenosis.     His activity is limited due to back pain. Upon exam, he is noted to have 1+ pitting lower extremity edema . He admits his legs hang dependent most of the day.  He refuses to wear compression stockings.      He reports a poor appetite and weight loss of > 30lbs over the past year.   He remains compliant with all  medications.                   Assessment and Plan:     1. Atrial fibrillation - managed on metoprolol and Eliquis  2. Aortic Stenosis - last echo showed mild to moderate aortic stenosis. Recommend follow up echo in 6 months. Continue on current medical therapy.   3. CAD - no symptoms of ischemia. Last angiogram confirms moderate non obstructive disease. Continue with aspirin, metoprolol, and statin. Encourage activity.   4. Hypertension - Well controlled on current medical therapy   5.  LE edema - encourage ACE wraps, low sodium diet, and elevation.   6. Hyperlipidemia - LDL at goal, continue on current dose of statin. Encourage heart healthy diet.   7.  Back pain - follows with PCP and neurology.              Thank you for allowing me to participate in this delightful patient's care. He will need to follow up in 6 months to establish care with cardiologist.        FABI Griffith, CNP          Review of Systems:   Review of Systems:  Skin:  Negative     Eyes:  Positive for glasses  ENT:  Negative    Respiratory:  Positive for dyspnea on exertion  Cardiovascular:    Positive for;lightheadedness;fatigue;dizziness;chest pain  Gastroenterology: Positive for poor appetite  Genitourinary:  Negative    Musculoskeletal:  Positive for joint pain;arthritis;muscular weakness  Neurologic:  Positive for stroke;incoordination  Psychiatric:  Positive for sleep disturbances  Heme/Lymph/Imm:  Positive for    Endocrine:  Negative                Physical Exam:     GEN:  In general, this is a frail elderly male in no acute distress. Patient ambulatory with cane/walker  HEENT:  Pupils equal, round. Sclerae nonicteric. Clear oropharynx. Mucous membranes moist.  NECK: Supple, no masses appreciated. Trachea midline. No JVD   C/V:  Regular rate and irregular rhythm, systolic  murmur, rub or gallop.   RESP: Respirations are unlabored. No use of accessory muscles. Clear to auscultation bilaterally without wheezing, rales, or  rhonchi.  GI: Abdomen soft, nontender, nondistended. No HSM appreciated.   EXTREM: 1+ pitting LE edema.   NEURO: Alert and oriented, cooperative. Gait stable with assistance device. Extremity weakness  PSYCH: Normal affect.  SKIN: Warm and dry. No rashes or petechiae appreciated.          Past Medical History:     Past Medical History:   Diagnosis Date     Atrial fibrillation (H)      Cancer (H) vocal cord     Carpal tunnel syndrome     abstracted 7/3/02.     CVA (cerebral infarction) 5/5/2015     Demyelinating disease of central nervous system, unspecified (H)     abstracted 7/3/02.     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     Pneumonia 8/17/2016     Redundant colon     needs CT colonography     S/P coronary angiogram 09/05/2017     Shingles      SKIN DISORDERS NEC 3/15/2005     Sleep apnea               Past Surgical History:     Past Surgical History:   Procedure Laterality Date     BIOPSY  brain 2002     BONE MARROW BIOPSY, BONE SPECIMEN, NEEDLE/TROCAR N/A 6/8/2017    Procedure: BIOPSY BONE MARROW;  UNILATERAL BONE MARROW BIOPSY (CONSCIOUS SEDATION) ;  Surgeon: Jamie Gonzales MD;  Location:  GI     C NONSPECIFIC PROCEDURE  as a child    T & A. abstracted 7/3/02.     C NONSPECIFIC PROCEDURE  early    CTR. abstracted 7/3/02.     ESOPHAGOSCOPY, GASTROSCOPY, DUODENOSCOPY (EGD), COMBINED N/A 9/8/2018    Procedure: COMBINED ESOPHAGOSCOPY, GASTROSCOPY, DUODENOSCOPY (EGD), BIOPSY SINGLE OR MULTIPLE;;  Surgeon: Xander Marie MD;  Location:  GI     HEAD & NECK SURGERY       ORTHOPEDIC SURGERY      right arm ulna reset after injury     THORACOSCOPIC WEDGE RESECTION LUNG Right 8/2/2016    Procedure: THORACOSCOPIC WEDGE RESECTION LUNG;  Surgeon: Abdelrahman Noriega MD;  Location:  OR              Allergies:   Lasix  [furosemide]; Sulfa drugs; Adhesive tape; Amiodarone; and Penicillins       Data:   All laboratory data reviewed:    LAST CHOLESTEROL:  Lab Results   Component Value Date    CHOL 146 05/30/2018     Lab Results   Component Value Date    HDL 61 05/30/2018     Lab Results   Component Value Date    LDL 67 05/30/2018     Lab Results   Component Value Date    TRIG 90 05/30/2018     Lab Results   Component Value Date    CHOLHDLRATIO 2.7 09/04/2015       LAST BMP:  Lab Results   Component Value Date     10/01/2018      Lab Results   Component Value Date    POTASSIUM 4.6 10/01/2018     Lab Results   Component Value Date    CHLORIDE 104 10/01/2018     Lab Results   Component Value Date    ULISSES 8.9 10/01/2018     Lab Results   Component Value Date    CO2 25 10/01/2018     Lab Results   Component Value Date    BUN 7 10/01/2018     Lab Results   Component Value Date    CR 0.94 10/01/2018     Lab Results   Component Value Date    GLC 89 10/01/2018       LAST CBC:  Lab Results   Component Value Date    WBC 13.7 09/19/2018     Lab Results   Component Value Date    RBC 3.72 09/19/2018     Lab Results   Component Value Date    HGB 13.5 10/22/2018     Lab Results   Component Value Date    HCT 36.8 09/19/2018     Lab Results   Component Value Date    MCV 99 09/19/2018     Lab Results   Component Value Date    MCH 32.0 09/19/2018     Lab Results   Component Value Date    MCHC 32.3 09/19/2018     Lab Results   Component Value Date    RDW 15.3 09/19/2018     Lab Results   Component Value Date     10/22/2018             Thank you for allowing me to participate in the care of your patient.      Sincerely,     FABI Griffith CNP     [facility]    cc:   FABI Griffith CNP  [address] [place] [address]

## 2018-11-20 NOTE — PATIENT INSTRUCTIONS
Thanks for coming into [facility] today.    No cardiac complaints  Healing from compression fracture  Encourage activity  Elevate legs, use ACE wraps to manage edema.  Monitor worsening shortness of breath  Follow up echo in May   Follow up in 6 months to est care.         Please call my nurse at [phone] with any questions or concerns.    Scheduling phone number: [phone]  Reminder: Please bring in all current medications, over the counter supplements and vitamin bottles to your next appointment.

## 2018-11-20 NOTE — LETTER
11/20/2018    Danny Paige MD, MD  [address]  [facility] [address]    RE: Efrem Ramos       Dear Colleague,    I had the pleasure of seeing Efrem Ramos in the [facility].    Cardiology Clinic Progress Note  Efrem Ramos MRN# [id number]   YOB: 1945 Age: 73 year old     Reason For Visit:  6 month follow up              History of Presenting Illness:    Efrem Ramos is a pleasant 73 year old patient who comes to the office today for a 6 month follow.    He carries a past medical history significant for stroke with visual changes, atrial fibrillation/flutter, aortic stenosis, acute disseminated  encephalomyelitis, coronary artery disease, sleep apnea, hypertension, hyperlipidemia, colitis, COPD, PVD and MGUS.      He is feeling well on a cardiac standpoint, denies chest pain, shortness of breath, PND, orthopnea, presyncope, syncope,  heart racing, or palpitations. His primary complaint is back pain.  In October,  he was evaluated by his primary doctor for back pain and found to have L1 compression fracture. On 10/22/2018,  he underwent L1 vertebroplasty without complication.     Blood pressure is well controlled at 110/70, managed on low-dose metoprolol  BMP showed a sodium of 138, potassium 4.6, BUN 7, creatinine 0.94, GFR 78  Hemoglobin normal at 13.5, platelet 365  BMI 24.81    Atrial fibrillation is rate controlled on metoprolol, anticoagulated with Eliquis. He denies any evidence of bleeding.   Last echo showed normal LV function 60-65%. Severe mitral annular calcification, mild MR, mild to moderate aortic stenosis.     His activity is limited due to back pain. Upon exam, he is noted to have 1+ pitting lower extremity edema . He admits his legs hang dependent most of the day.  He refuses to wear compression stockings.      He reports a poor appetite and weight loss of > 30lbs over the past year.   He remains compliant with all  medications.                   Assessment and Plan:     1. Atrial fibrillation - managed on metoprolol and Eliquis  2. Aortic Stenosis - last echo showed mild to moderate aortic stenosis. Recommend follow up echo in 6 months. Continue on current medical therapy.   3. CAD - no symptoms of ischemia. Last angiogram confirms moderate non obstructive disease. Continue with aspirin, metoprolol, and statin. Encourage activity.   4. Hypertension - Well controlled on current medical therapy   5.  LE edema - encourage ACE wraps, low sodium diet, and elevation.   6. Hyperlipidemia - LDL at goal, continue on current dose of statin. Encourage heart healthy diet.   7.  Back pain - follows with PCP and neurology.              Thank you for allowing me to participate in this delightful patient's care. He will need to follow up in 6 months to establish care with cardiologist.        FABI Griffith, CNP          Review of Systems:   Review of Systems:  Skin:  Negative     Eyes:  Positive for glasses  ENT:  Negative    Respiratory:  Positive for dyspnea on exertion  Cardiovascular:    Positive for;lightheadedness;fatigue;dizziness;chest pain  Gastroenterology: Positive for poor appetite  Genitourinary:  Negative    Musculoskeletal:  Positive for joint pain;arthritis;muscular weakness  Neurologic:  Positive for stroke;incoordination  Psychiatric:  Positive for sleep disturbances  Heme/Lymph/Imm:  Positive for    Endocrine:  Negative                Physical Exam:     GEN:  In general, this is a frail elderly male in no acute distress. Patient ambulatory with cane/walker  HEENT:  Pupils equal, round. Sclerae nonicteric. Clear oropharynx. Mucous membranes moist.  NECK: Supple, no masses appreciated. Trachea midline. No JVD   C/V:  Regular rate and irregular rhythm, systolic  murmur, rub or gallop.   RESP: Respirations are unlabored. No use of accessory muscles. Clear to auscultation bilaterally without wheezing, rales, or  rhonchi.  GI: Abdomen soft, nontender, nondistended. No HSM appreciated.   EXTREM: 1+ pitting LE edema.   NEURO: Alert and oriented, cooperative. Gait stable with assistance device. Extremity weakness  PSYCH: Normal affect.  SKIN: Warm and dry. No rashes or petechiae appreciated.          Past Medical History:     Past Medical History:   Diagnosis Date     Atrial fibrillation (H)      Cancer (H) vocal cord     Carpal tunnel syndrome     abstracted 7/3/02.     CVA (cerebral infarction) 5/5/2015     Demyelinating disease of central nervous system, unspecified (H)     abstracted 7/3/02.     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     Pneumonia 8/17/2016     Redundant colon     needs CT colonography     S/P coronary angiogram 09/05/2017     Shingles      SKIN DISORDERS NEC 3/15/2005     Sleep apnea               Past Surgical History:     Past Surgical History:   Procedure Laterality Date     BIOPSY  brain 2002     BONE MARROW BIOPSY, BONE SPECIMEN, NEEDLE/TROCAR N/A 6/8/2017    Procedure: BIOPSY BONE MARROW;  UNILATERAL BONE MARROW BIOPSY (CONSCIOUS SEDATION) ;  Surgeon: Jamie Gonzales MD;  Location:  GI     C NONSPECIFIC PROCEDURE  as a child    T & A. abstracted 7/3/02.     C NONSPECIFIC PROCEDURE  early    CTR. abstracted 7/3/02.     ESOPHAGOSCOPY, GASTROSCOPY, DUODENOSCOPY (EGD), COMBINED N/A 9/8/2018    Procedure: COMBINED ESOPHAGOSCOPY, GASTROSCOPY, DUODENOSCOPY (EGD), BIOPSY SINGLE OR MULTIPLE;;  Surgeon: Xander Marie MD;  Location:  GI     HEAD & NECK SURGERY       ORTHOPEDIC SURGERY      right arm ulna reset after injury     THORACOSCOPIC WEDGE RESECTION LUNG Right 8/2/2016    Procedure: THORACOSCOPIC WEDGE RESECTION LUNG;  Surgeon: Abdelrahman Noriega MD;  Location:  OR              Allergies:   Lasix  [furosemide]; Sulfa drugs; Adhesive tape; Amiodarone; and Penicillins       Data:   All laboratory data reviewed:    LAST CHOLESTEROL:  Lab Results   Component Value Date    CHOL 146 05/30/2018     Lab Results   Component Value Date    HDL 61 05/30/2018     Lab Results   Component Value Date    LDL 67 05/30/2018     Lab Results   Component Value Date    TRIG 90 05/30/2018     Lab Results   Component Value Date    CHOLHDLRATIO 2.7 09/04/2015       LAST BMP:  Lab Results   Component Value Date     10/01/2018      Lab Results   Component Value Date    POTASSIUM 4.6 10/01/2018     Lab Results   Component Value Date    CHLORIDE 104 10/01/2018     Lab Results   Component Value Date    ULISSES 8.9 10/01/2018     Lab Results   Component Value Date    CO2 25 10/01/2018     Lab Results   Component Value Date    BUN 7 10/01/2018     Lab Results   Component Value Date    CR 0.94 10/01/2018     Lab Results   Component Value Date    GLC 89 10/01/2018       LAST CBC:  Lab Results   Component Value Date    WBC 13.7 09/19/2018     Lab Results   Component Value Date    RBC 3.72 09/19/2018     Lab Results   Component Value Date    HGB 13.5 10/22/2018     Lab Results   Component Value Date    HCT 36.8 09/19/2018     Lab Results   Component Value Date    MCV 99 09/19/2018     Lab Results   Component Value Date    MCH 32.0 09/19/2018     Lab Results   Component Value Date    MCHC 32.3 09/19/2018     Lab Results   Component Value Date    RDW 15.3 09/19/2018     Lab Results   Component Value Date     10/22/2018       Thank you for allowing me to participate in the care of your patient.    Sincerely,     FABI Griffith [facility]

## 2018-11-20 NOTE — MR AVS SNAPSHOT
After Visit Summary   11/20/2018    Efrem Ramos    MRN: [id number]           Patient Information     Date Of Birth          1945        Visit Information        Provider Department      11/20/2018 2:30 PM Marya Vela APRN [facility]        Today's Diagnoses     Aortic valve stenosis, etiology of cardiac valve disease unspecified          Care Instructions    Thanks for coming into [facility] today.    No cardiac complaints  Healing from compression fracture  Encourage activity  Elevate legs, use ACE wraps to manage edema.  Monitor worsening shortness of breath  Follow up echo in May   Follow up in 6 months to est care.         Please call my nurse at [phone] with any questions or concerns.    Scheduling phone number: [phone]  Reminder: Please bring in all current medications, over the counter supplements and vitamin bottles to your next appointment.                Follow-ups after your visit        Your next 10 appointments already scheduled     Nov 21, 2018  1:30 PM CST   TELEMEDICINE with Amparo Antunez RPH   [facility] ([facility])    [address] [place] [address] [address]  [facility] [address] [phone]           Note: this is not an onsite visit; there is no need to come to the facility.            Dec 27, 2018  1:30 PM CST   Return Visit with  Oncology Nurse   [facility] ([facility])    [facility]  [address] [facility] [address]  [facility] [address]   [phone]            Jan 03, 2019  2:00 PM CST   Return Visit with Shae Aldana MD   [facility] ([facility])    [facility]  [address] [facility] [address]  [facility] [address]   [phone]            Aug 14, 2019  1:30 PM CDT   Return Sleep Patient with Ron Pacheco MD   [facility] ([facility])  "   [address] [id number] [phone]              Who to contact     If you have questions or need follow up information about today's clinic visit or your schedule please contact [facility] directly at [phone].  Normal or non-critical lab and imaging results will be communicated to you by MyChart, letter or phone within 4 business days after the clinic has received the results. If you do not hear from us within 7 days, please contact the clinic through LaunchKeyhart or phone. If you have a critical or abnormal lab result, we will notify you by phone as soon as possible.  Submit refill requests through CollabRx or call your pharmacy and they will forward the refill request to us. Please allow 3 business days for your refill to be completed.          Additional Information About Your Visit        MyChart Information     CollabRx gives you secure access to your electronic health record. If you see a primary care provider, you can also send messages to your care team and make appointments. If you have questions, please call your primary care clinic.  If you do not have a primary care provider, please call [phone] and they will assist you.        Care EveryWhere ID     This is your Care EveryWhere ID. This could be used by other organizations to access your [facility] medical records  [id number]        Your Vitals Were     Pulse Height BMI (Body Mass Index)             70 1.727 m (5' 8\") 24.75 kg/m2          Blood Pressure from Last 3 Encounters:   11/20/18 110/70   10/22/18 102/68   10/22/18 111/62    Weight from Last 3 Encounters:   11/20/18 73.8 kg (162 lb 12.8 oz)   10/22/18 76.2 kg (168 lb)   10/22/18 75.7 kg (166 lb 14.4 oz)              We Performed the Following     Follow-Up with Cardiac Advanced Practice Provider          Today's Medication Changes          These changes are accurate as of 11/20/18  3:00 PM.  If you have any " questions, ask your nurse or doctor.               Stop taking these medicines if you haven't already. Please contact your care team if you have questions.     budesonide 3 MG EC capsule   Commonly known as:  ENTOCORT EC   Stopped by:  Marya Vela APRN CNP                    Primary Care Provider Office Phone # Fax #    Danny Paige [phone] [address] [facility] [address]  [facility] [address]        Equal Access to Services     [facility]: Hadii aad ku hadasho Soomaali, waaxda luqadaha, qaybta kaalmada adeegyada, waxay idiin hayaan adeeg kharash la'aan . So [facility] [phone].    ATENCIÓN: Si habla español, tiene a palomo disposición servicios gratuitos de asistencia lingüística. [facility] [phone].    We comply with applicable federal civil rights laws and [place] laws. We do not discriminate on the basis of race, color, national origin, age, disability, sex, sexual orientation, or gender identity.            Thank you!     Thank you for choosing [facility]  for your care. Our goal is always to provide you with excellent care. Hearing back from our patients is one way we can continue to improve our services. Please take a few minutes to complete the written survey that you may receive in the mail after your visit with us. Thank you!             Your Updated Medication List - Protect others around you: Learn how to safely use, store and throw away your medicines at www.disposemymeds.org.          This list is accurate as of 11/20/18  3:00 PM.  Always use your most recent med list.                   Brand Name Dispense Instructions for use Diagnosis    acetaminophen 325 MG tablet    TYLENOL     Take 325-650 mg by mouth every 6 hours as needed for mild pain        apixaban ANTICOAGULANT 5 MG tablet    ELIQUIS    180 tablet    Take 1 tablet (5 mg) by mouth 2 times daily    Atrial fibrillation and flutter (H)       ASPIRIN PO      Take 81 mg by  mouth every evening        atorvastatin 40 MG tablet    LIPITOR    90 tablet    Take 1 tablet (40 mg) by mouth daily    MGUS (monoclonal gammopathy of unknown significance)       calcium carbonate 600 mg-vitamin D 400 units 600-400 MG-UNIT per tablet    CALTRATE     Take 1 tablet by mouth 2 times daily        gabapentin 300 MG capsule    NEURONTIN    60 capsule    Take 2 capsules (600 mg) by mouth every evening    Restless legs syndrome (RLS)       HYDROcodone-acetaminophen 5-325 MG per tablet    NORCO    18 tablet    Take 1 tablet by mouth every 4 hours as needed for pain    Non-traumatic compression fracture of L1 lumbar vertebra with routine healing, subsequent encounter       loperamide 2 MG capsule    IMODIUM     Take 2 mg by mouth 4 times daily as needed for diarrhea        metoprolol succinate 25 MG 24 hr tablet    TOPROL-XL    45 tablet    Take 0.5 tablets (12.5 mg) by mouth daily    Nonrheumatic aortic valve stenosis       multivitamin Tabs tablet      Take 1 tablet by mouth 2 times daily        polyethylene glycol powder    MIRALAX/GLYCOLAX     Take 1 capful by mouth daily as needed for constipation        senna-docusate 8.6-50 MG per tablet    SENOKOT-S;PERICOLACE    120 tablet    Take 2 tablets by mouth 2 times daily    Collagenous colitis       vitamin B12 2000 MCG tablet    CYANOCOBALAMIN     Take 2,000 mcg by mouth every 7 days On Sundays

## 2018-11-21 ENCOUNTER — ALLIED HEALTH/NURSE VISIT (OUTPATIENT)
Dept: PHARMACY | Facility: CLINIC | Age: 73
End: 2018-11-21
Payer: COMMERCIAL

## 2018-11-21 DIAGNOSIS — M80.00XA AGE-RELATED OSTEOPOROSIS WITH CURRENT PATHOLOGICAL FRACTURE, INITIAL ENCOUNTER: Primary | ICD-10-CM

## 2018-11-21 DIAGNOSIS — M48.56XD NON-TRAUMATIC COMPRESSION FRACTURE OF L1 LUMBAR VERTEBRA WITH ROUTINE HEALING, SUBSEQUENT ENCOUNTER: ICD-10-CM

## 2018-11-21 PROCEDURE — 99606 MTMS BY PHARM EST 15 MIN: CPT | Performed by: PHARMACIST

## 2018-11-21 PROCEDURE — 99607 MTMS BY PHARM ADDL 15 MIN: CPT | Performed by: PHARMACIST

## 2018-11-21 NOTE — MR AVS SNAPSHOT
After Visit Summary   11/21/2018    Efrem Ramos    MRN: [id number]           Patient Information     Date Of Birth          1945        Visit Information        Provider Department      11/21/2018 1:30 PM Amparo Antunez [facility]        Today's Diagnoses     Age-related osteoporosis with current pathological fracture, initial encounter    -  1    Non-traumatic compression fracture of L1 lumbar vertebra with routine healing, subsequent encounter           Follow-ups after your visit        Your next 10 appointments already scheduled     Dec 27, 2018  1:30 PM CST   Return Visit with  Oncology Nurse   [facility])    [facility]  [address]  [place] [address]   [phone]            Jan 03, 2019  2:00 PM CST   Return Visit with Shae Aldana MD   [facility]    [facility] [place]  [address]  [place] [address]   [phone]            Aug 14, 2019  1:30 PM CDT   Return Sleep Patient with Ron Pacheco MD   [facility] ([facility])    [address] [facility] [address]  [place] [address] [phone]              Future tests that were ordered for you today     Open Future Orders        Priority Expected Expires Ordered    Follow-Up with Cardiologist Routine 5/19/2019 11/20/2019 11/20/2018    Echocardiogram Routine 5/27/2019 11/20/2019 11/20/2018            Who to contact     If you have questions or need follow up information about today's clinic visit or your schedule please contact [facility] directly at [phone].  Normal or non-critical lab and imaging results will be communicated to you by MyChart, letter or phone within 4 business days after the clinic has received the results. If you do not hear from us within 7 days, please contact the clinic through  motifyhart or phone. If you have a critical or abnormal lab result, we will notify you by phone as soon as possible.  Submit refill requests through Nextdoor or call your pharmacy and they will forward the refill request to us. Please allow 3 business days for your refill to be completed.          Additional Information About Your Visit        motifyhart Information     Nextdoor gives you secure access to your electronic health record. If you see a primary care provider, you can also send messages to your care team and make appointments. If you have questions, please call your primary care clinic.  If you do not have a primary care provider, please call [phone] and they will assist you.        Care EveryWhere ID     This is your Care EveryWhere ID. This could be used by other organizations to access your [facility] medical records  [id number]         Blood Pressure from Last 3 Encounters:   11/20/18 110/70   10/22/18 102/68   10/22/18 111/62    Weight from Last 3 Encounters:   11/20/18 162 lb 12.8 oz (73.8 kg)   10/22/18 168 lb (76.2 kg)   10/22/18 166 lb 14.4 oz (75.7 kg)              Today, you had the following     No orders found for display       Primary Care Provider Office Phone # Fax #    Danny Paige [phone] [address] [facility] [address]  [place] [address]        Equal Access to Services     [facility]: Hadii aad ku hadasho Soomaali, waaxda luqadaha, qaybta kaalmada yrisyada, alfonzo olvera . So [facility] [phone].    ATENCIÓN: Si habla español, tiene a palomo disposición servicios gratuitos de asistencia lingüística. Ummame al [phone].    We comply with applicable federal civil rights laws and [place] laws. We do not discriminate on the basis of race, color, national origin, age, disability, sex, sexual orientation, or gender identity.            Thank you!     Thank you for choosing [facility]  for your care. Our goal is always  to provide you with excellent care. Hearing back from our patients is one way we can continue to improve our services. Please take a few minutes to complete the written survey that you may receive in the mail after your visit with us. Thank you!             Your Updated Medication List - Protect others around you: Learn how to safely use, store and throw away your medicines at www.disposemymeds.org.          This list is accurate as of 11/21/18  1:57 PM.  Always use your most recent med list.                   Brand Name Dispense Instructions for use Diagnosis    acetaminophen 325 MG tablet    TYLENOL     Take 325-650 mg by mouth every 6 hours as needed for mild pain        apixaban ANTICOAGULANT 5 MG tablet    ELIQUIS    180 tablet    Take 1 tablet (5 mg) by mouth 2 times daily    Atrial fibrillation and flutter (H)       ASPIRIN PO      Take 81 mg by mouth every evening        atorvastatin 40 MG tablet    LIPITOR    90 tablet    Take 1 tablet (40 mg) by mouth daily    MGUS (monoclonal gammopathy of unknown significance)       calcium carbonate 600 mg-vitamin D 400 units 600-400 MG-UNIT per tablet    CALTRATE     Take 1 tablet by mouth 2 times daily        gabapentin 300 MG capsule    NEURONTIN    60 capsule    Take 2 capsules (600 mg) by mouth every evening    Restless legs syndrome (RLS)       HYDROcodone-acetaminophen 5-325 MG tablet    NORCO    18 tablet    Take 1 tablet by mouth every 4 hours as needed for pain    Non-traumatic compression fracture of L1 lumbar vertebra with routine healing, subsequent encounter       loperamide 2 MG capsule    IMODIUM     Take 2 mg by mouth 4 times daily as needed for diarrhea        metoprolol succinate 25 MG 24 hr tablet    TOPROL-XL    45 tablet    Take 0.5 tablets (12.5 mg) by mouth daily    Nonrheumatic aortic valve stenosis       multivitamin Tabs tablet      Take 1 tablet by mouth 2 times daily        polyethylene glycol powder    MIRALAX/GLYCOLAX     Take 1 capful by  mouth daily as needed for constipation        senna-docusate 8.6-50 MG per tablet    SENOKOT-S;PERICOLACE    120 tablet    Take 2 tablets by mouth 2 times daily    Collagenous colitis       vitamin B12 2000 MCG tablet    CYANOCOBALAMIN     Take 2,000 mcg by mouth every 7 days On Sundays

## 2018-11-21 NOTE — PROGRESS NOTES
Cardiology Clinic Progress Note  Efrem Ramos MRN# [id number]   YOB: 1945 Age: 73 year old     Reason For Visit:  6 month follow up              History of Presenting Illness:    Efrem Ramos is a pleasant 73 year old patient who comes to the office today for a 6 month follow.    He carries a past medical history significant for stroke with visual changes, atrial fibrillation/flutter, aortic stenosis, acute disseminated  encephalomyelitis, coronary artery disease, sleep apnea, hypertension, hyperlipidemia, colitis, COPD, PVD and MGUS.      He is feeling well on a cardiac standpoint, denies chest pain, shortness of breath, PND, orthopnea, presyncope, syncope,  heart racing, or palpitations. His primary complaint is back pain.  In October,  he was evaluated by his primary doctor for back pain and found to have L1 compression fracture. On 10/22/2018,  he underwent L1 vertebroplasty without complication.     Blood pressure is well controlled at 110/70, managed on low-dose metoprolol  BMP showed a sodium of 138, potassium 4.6, BUN 7, creatinine 0.94, GFR 78  Hemoglobin normal at 13.5, platelet 365  BMI 24.81    Atrial fibrillation is rate controlled on metoprolol, anticoagulated with Eliquis. He denies any evidence of bleeding.   Last echo showed normal LV function 60-65%. Severe mitral annular calcification, mild MR, mild to moderate aortic stenosis.     His activity is limited due to back pain. Upon exam, he is noted to have 1+ pitting lower extremity edema . He admits his legs hang dependent most of the day.  He refuses to wear compression stockings.      He reports a poor appetite and weight loss of > 30lbs over the past year.   He remains compliant with all medications.                   Assessment and Plan:     1. Atrial fibrillation - managed on metoprolol and Eliquis  2. Aortic Stenosis - last echo showed mild to moderate aortic stenosis. Recommend follow up echo in 6 months. Continue on  current medical therapy.   3. CAD - no symptoms of ischemia. Last angiogram confirms moderate non obstructive disease. Continue with aspirin, metoprolol, and statin. Encourage activity.   4. Hypertension - Well controlled on current medical therapy   5.  LE edema - encourage ACE wraps, low sodium diet, and elevation.   6. Hyperlipidemia - LDL at goal, continue on current dose of statin. Encourage heart healthy diet.   7.  Back pain - follows with PCP and neurology.              Thank you for allowing me to participate in this delightful patient's care. He will need to follow up in 6 months to establish care with cardiologist.        Marya Vela, FABI, CNP          Review of Systems:   Review of Systems:  Skin:  Negative     Eyes:  Positive for glasses  ENT:  Negative    Respiratory:  Positive for dyspnea on exertion  Cardiovascular:    Positive for;lightheadedness;fatigue;dizziness;chest pain  Gastroenterology: Positive for poor appetite  Genitourinary:  Negative    Musculoskeletal:  Positive for joint pain;arthritis;muscular weakness  Neurologic:  Positive for stroke;incoordination  Psychiatric:  Positive for sleep disturbances  Heme/Lymph/Imm:  Positive for    Endocrine:  Negative                Physical Exam:     GEN:  In general, this is a frail elderly male in no acute distress. Patient ambulatory with cane/walker  HEENT:  Pupils equal, round. Sclerae nonicteric. Clear oropharynx. Mucous membranes moist.  NECK: Supple, no masses appreciated. Trachea midline. No JVD   C/V:  Regular rate and irregular rhythm, systolic  murmur, rub or gallop.   RESP: Respirations are unlabored. No use of accessory muscles. Clear to auscultation bilaterally without wheezing, rales, or rhonchi.  GI: Abdomen soft, nontender, nondistended. No HSM appreciated.   EXTREM: 1+ pitting LE edema.   NEURO: Alert and oriented, cooperative. Gait stable with assistance device. Extremity weakness  PSYCH: Normal affect.  SKIN: Warm and dry. No  rashes or petechiae appreciated.          Past Medical History:     Past Medical History:   Diagnosis Date     Atrial fibrillation (H)      Cancer (H) vocal cord     Carpal tunnel syndrome     abstracted 7/3/02.     CVA (cerebral infarction) 5/5/2015     Demyelinating disease of central nervous system, unspecified (H)     abstracted 7/3/02.     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     Pneumonia 8/17/2016     Redundant colon     needs CT colonography     S/P coronary angiogram 09/05/2017     Shingles      SKIN DISORDERS NEC 3/15/2005     Sleep apnea               Past Surgical History:     Past Surgical History:   Procedure Laterality Date     BIOPSY  brain 2002     BONE MARROW BIOPSY, BONE SPECIMEN, NEEDLE/TROCAR N/A 6/8/2017    Procedure: BIOPSY BONE MARROW;  UNILATERAL BONE MARROW BIOPSY (CONSCIOUS SEDATION) ;  Surgeon: Jamie Gonzales MD;  Location:  GI     C NONSPECIFIC PROCEDURE  as a child    T & A. abstracted 7/3/02.     C NONSPECIFIC PROCEDURE  early    CTR. abstracted 7/3/02.     ESOPHAGOSCOPY, GASTROSCOPY, DUODENOSCOPY (EGD), COMBINED N/A 9/8/2018    Procedure: COMBINED ESOPHAGOSCOPY, GASTROSCOPY, DUODENOSCOPY (EGD), BIOPSY SINGLE OR MULTIPLE;;  Surgeon: Xander Marie MD;  Location:  GI     HEAD & NECK SURGERY       ORTHOPEDIC SURGERY      right arm ulna reset after injury     THORACOSCOPIC WEDGE RESECTION LUNG Right 8/2/2016    Procedure: THORACOSCOPIC WEDGE RESECTION LUNG;  Surgeon: Abdelrahman Noriega MD;  Location:  OR              Allergies:   Lasix [furosemide]; Sulfa drugs; Adhesive tape; Amiodarone; and Penicillins       Data:   All laboratory data reviewed:    LAST CHOLESTEROL:  Lab Results   Component Value Date    CHOL 146 05/30/2018     Lab Results   Component Value Date    HDL 61 05/30/2018      Lab Results   Component Value Date    LDL 67 05/30/2018     Lab Results   Component Value Date    TRIG 90 05/30/2018     Lab Results   Component Value Date    CHOLHDLRATIO 2.7 09/04/2015       LAST BMP:  Lab Results   Component Value Date     10/01/2018      Lab Results   Component Value Date    POTASSIUM 4.6 10/01/2018     Lab Results   Component Value Date    CHLORIDE 104 10/01/2018     Lab Results   Component Value Date    ULISSES 8.9 10/01/2018     Lab Results   Component Value Date    CO2 25 10/01/2018     Lab Results   Component Value Date    BUN 7 10/01/2018     Lab Results   Component Value Date    CR 0.94 10/01/2018     Lab Results   Component Value Date    GLC 89 10/01/2018       LAST CBC:  Lab Results   Component Value Date    WBC 13.7 09/19/2018     Lab Results   Component Value Date    RBC 3.72 09/19/2018     Lab Results   Component Value Date    HGB 13.5 10/22/2018     Lab Results   Component Value Date    HCT 36.8 09/19/2018     Lab Results   Component Value Date    MCV 99 09/19/2018     Lab Results   Component Value Date    MCH 32.0 09/19/2018     Lab Results   Component Value Date    MCHC 32.3 09/19/2018     Lab Results   Component Value Date    RDW 15.3 09/19/2018     Lab Results   Component Value Date     10/22/2018

## 2018-11-21 NOTE — PROGRESS NOTES
SUBJECTIVE/OBJECTIVE:                Efrem Ramos is a 73 year old male called for a follow-up visit for Medication Therapy Management.  He was referred to me from Dr. Paige.  His wife, Mariajose, spoke on his behalf today (with his authorization at the beginning of the visit).    Chief Complaint: Follow up from our visit on 10/22/18.  Called to f/up on osteoporosis treatment options following DEXA scan.    Tobacco: History of tobacco dependence - quit 2013  Alcohol: not currently using    Medication Adherence/Access: no issues reported    Osteoporosis/Compression Fracture: Current therapy includes: calcium/vitamin D BID. Pt is not experiencing side effects.  Pt is getting the following sources of dietary calcium: limited  Last vitamin D level: 10/9/18 - 28 micrograms/L  DEXA History: 10/31/18 - IMPRESSION: 1. Prominent osteopenia within L2.  2. Mild osteoporosis within the left femoral neck. 3. The right femoral neck bone density is borderline between osteoporosis and osteopenia.  4. Mild/moderate osteopenia within the distal 33% radius.    Risk factors: recent fracture   Estimated Creatinine Clearance: 70.9 mL/min (based on Cr of 0.94).     Other medications/conditions were not discussed per Mariajose's preference.    Today's Vitals: There were no vitals taken for this visit. - telephone visit    ASSESSMENT:              Current medications were reviewed today as discussed above.      Medication Adherence: good, no issues identified    Osteoporosis/Compression Fracture: Needs Improvement.  Osteoporosis is currently untreated - would benefit from starting therapy.  Patient would likely benefit from an injection option such as Reclast or Prolia given that he has esophageal issues with prior history of vocal cord cancer.  Would recommend he finish therapy for compression fracture prior to receiving any medication for osteoporosis (unclear if he's getting further vertebroplasties, etc).     PLAN:                  1.   Discussed risks vs benefits of Prolia vs Reclast.  Mariajose will discuss with Alfredo and get back to me.    I spent 30 minutes with this patient today. A copy of the visit note was provided to the patient's primary care provider.     Will follow up in the next 2 weeks if I don't hear back from them.    The patient declined a summary of these recommendations as an after visit summary.    Amparo Antunez, PharmD, [facility]  Medication Therapy Management Provider  Pager: [phone]

## 2018-12-26 ENCOUNTER — TRANSFERRED RECORDS (OUTPATIENT)
Dept: HEALTH INFORMATION MANAGEMENT | Facility: CLINIC | Age: 73
End: 2018-12-26

## 2018-12-31 ENCOUNTER — ONCOLOGY VISIT (OUTPATIENT)
Dept: ONCOLOGY | Facility: CLINIC | Age: 73
End: 2018-12-31
Attending: INTERNAL MEDICINE
Payer: MEDICARE

## 2018-12-31 ENCOUNTER — HOSPITAL ENCOUNTER (OUTPATIENT)
Facility: CLINIC | Age: 73
Setting detail: SPECIMEN
Discharge: HOME | End: 2018-12-31
Attending: INTERNAL MEDICINE | Admitting: INTERNAL MEDICINE
Payer: MEDICARE

## 2018-12-31 DIAGNOSIS — D47.2 MGUS (MONOCLONAL GAMMOPATHY OF UNKNOWN SIGNIFICANCE): ICD-10-CM

## 2018-12-31 DIAGNOSIS — I25.119 CORONARY ARTERY DISEASE INVOLVING NATIVE CORONARY ARTERY OF NATIVE HEART WITH ANGINA PECTORIS (H): Primary | ICD-10-CM

## 2018-12-31 LAB
ALBUMIN SERPL-MCNC: 3.3 G/DL (ref 3.4–5)
ALP SERPL-CCNC: 92 U/L (ref 40–150)
ALT SERPL W P-5'-P-CCNC: 20 U/L (ref 0–70)
ANION GAP SERPL CALCULATED.3IONS-SCNC: 10 MMOL/L (ref 3–14)
AST SERPL W P-5'-P-CCNC: 21 U/L (ref 0–45)
BASOPHILS # BLD AUTO: 0.1 10E9/L (ref 0–0.2)
BASOPHILS NFR BLD AUTO: 0.8 %
BILIRUB SERPL-MCNC: 0.8 MG/DL (ref 0.2–1.3)
BUN SERPL-MCNC: 12 MG/DL (ref 7–30)
CALCIUM SERPL-MCNC: 9 MG/DL (ref 8.5–10.1)
CHLORIDE SERPL-SCNC: 104 MMOL/L (ref 94–109)
CO2 SERPL-SCNC: 26 MMOL/L (ref 20–32)
CREAT SERPL-MCNC: 0.8 MG/DL (ref 0.66–1.25)
DIFFERENTIAL METHOD BLD: ABNORMAL
EOSINOPHIL # BLD AUTO: 0.4 10E9/L (ref 0–0.7)
EOSINOPHIL NFR BLD AUTO: 3.2 %
ERYTHROCYTE [DISTWIDTH] IN BLOOD BY AUTOMATED COUNT: 13.7 % (ref 10–15)
GFR SERPL CREATININE-BSD FRML MDRD: 87 ML/MIN/{1.73_M2}
GLUCOSE SERPL-MCNC: 89 MG/DL (ref 70–99)
HCT VFR BLD AUTO: 37.8 % (ref 40–53)
HGB BLD-MCNC: 12.5 G/DL (ref 13.3–17.7)
IMM GRANULOCYTES # BLD: 0.1 10E9/L (ref 0–0.4)
IMM GRANULOCYTES NFR BLD: 0.5 %
LYMPHOCYTES # BLD AUTO: 2.1 10E9/L (ref 0.8–5.3)
LYMPHOCYTES NFR BLD AUTO: 18.8 %
MCH RBC QN AUTO: 31.7 PG (ref 26.5–33)
MCHC RBC AUTO-ENTMCNC: 33.1 G/DL (ref 31.5–36.5)
MCV RBC AUTO: 96 FL (ref 78–100)
MONOCYTES # BLD AUTO: 0.9 10E9/L (ref 0–1.3)
MONOCYTES NFR BLD AUTO: 8.3 %
NEUTROPHILS # BLD AUTO: 7.6 10E9/L (ref 1.6–8.3)
NEUTROPHILS NFR BLD AUTO: 68.4 %
NRBC # BLD AUTO: 0 10*3/UL
NRBC BLD AUTO-RTO: 0 /100
PLATELET # BLD AUTO: 282 10E9/L (ref 150–450)
POTASSIUM SERPL-SCNC: 4 MMOL/L (ref 3.4–5.3)
PROT SERPL-MCNC: 7.5 G/DL (ref 6.8–8.8)
RBC # BLD AUTO: 3.94 10E12/L (ref 4.4–5.9)
SODIUM SERPL-SCNC: 140 MMOL/L (ref 133–144)
WBC # BLD AUTO: 11.1 10E9/L (ref 4–11)

## 2018-12-31 PROCEDURE — 80053 COMPREHEN METABOLIC PANEL: CPT | Performed by: INTERNAL MEDICINE

## 2018-12-31 PROCEDURE — 83883 ASSAY NEPHELOMETRY NOT SPEC: CPT | Performed by: INTERNAL MEDICINE

## 2018-12-31 PROCEDURE — 82784 ASSAY IGA/IGD/IGG/IGM EACH: CPT | Performed by: INTERNAL MEDICINE

## 2018-12-31 PROCEDURE — 00000402 ZZHCL STATISTIC TOTAL PROTEIN: Performed by: INTERNAL MEDICINE

## 2018-12-31 PROCEDURE — 84165 PROTEIN E-PHORESIS SERUM: CPT | Performed by: INTERNAL MEDICINE

## 2018-12-31 PROCEDURE — 36415 COLL VENOUS BLD VENIPUNCTURE: CPT

## 2018-12-31 PROCEDURE — 86334 IMMUNOFIX E-PHORESIS SERUM: CPT | Performed by: INTERNAL MEDICINE

## 2018-12-31 PROCEDURE — 85025 COMPLETE CBC W/AUTO DIFF WBC: CPT | Performed by: INTERNAL MEDICINE

## 2018-12-31 NOTE — LETTER
12/31/2018         RE: Efrem Ramos  [address]  [place] [facility] [address]        Dear Colleague,    Thank you for referring your patient, Efrem Ramos, to the [facility]. Please see a copy of my visit note below.    Medical Assistant Note:  Efrem Ramos presents today for Blood Draw.    Patient seen by provider today: No   present during visit today: Not Applicable.    Concerns: No Concerns.    Procedure:  Lab draw site: LAC, Needle type: BF, Gauge: 23.    Post Assessment:  Labs drawn without difficulty: Yes.    Discharge Plan:  Departure Mode: Ambulatory.    Face to Face Time: 4 min.    Mague Lechuga CMA              Again, thank you for allowing me to participate in the care of your patient.        Sincerely,        Oncology Nurse

## 2018-12-31 NOTE — PROGRESS NOTES
Medical Assistant Note:  Efrem Ramos presents today for Blood Draw.    Patient seen by provider today: No   present during visit today: Not Applicable.    Concerns: No Concerns.    Procedure:  Lab draw site: LAC, Needle type: BF, Gauge: 23.    Post Assessment:  Labs drawn without difficulty: Yes.    Discharge Plan:  Departure Mode: Ambulatory.    Face to Face Time: 4 min.    Mague Lechuga CMA

## 2019-01-02 LAB
ALBUMIN SERPL ELPH-MCNC: 3.6 G/DL (ref 3.7–5.1)
ALPHA1 GLOB SERPL ELPH-MCNC: 0.3 G/DL (ref 0.2–0.4)
ALPHA2 GLOB SERPL ELPH-MCNC: 0.9 G/DL (ref 0.5–0.9)
B-GLOBULIN SERPL ELPH-MCNC: 0.8 G/DL (ref 0.6–1)
GAMMA GLOB SERPL ELPH-MCNC: 1.6 G/DL (ref 0.7–1.6)
IGA SERPL-MCNC: 360 MG/DL (ref 70–380)
IGG SERPL-MCNC: 1380 MG/DL (ref 695–1620)
IGM SERPL-MCNC: 213 MG/DL (ref 60–265)
KAPPA LC UR-MCNC: 5.1 MG/DL (ref 0.33–1.94)
KAPPA LC/LAMBDA SER: 2.12 {RATIO} (ref 0.26–1.65)
LAMBDA LC SERPL-MCNC: 2.41 MG/DL (ref 0.57–2.63)
M PROTEIN SERPL ELPH-MCNC: 0.5 G/DL
PROT PATTERN SERPL ELPH-IMP: ABNORMAL
PROT PATTERN SERPL IFE-IMP: NORMAL

## 2019-01-03 ENCOUNTER — ONCOLOGY VISIT (OUTPATIENT)
Dept: ONCOLOGY | Facility: CLINIC | Age: 74
End: 2019-01-03
Attending: INTERNAL MEDICINE
Payer: MEDICARE

## 2019-01-03 VITALS
HEART RATE: 62 BPM | BODY MASS INDEX: 23.52 KG/M2 | DIASTOLIC BLOOD PRESSURE: 74 MMHG | HEIGHT: 68 IN | TEMPERATURE: 97.7 F | WEIGHT: 155.2 LBS | OXYGEN SATURATION: 95 % | SYSTOLIC BLOOD PRESSURE: 131 MMHG

## 2019-01-03 DIAGNOSIS — D47.2 MGUS (MONOCLONAL GAMMOPATHY OF UNKNOWN SIGNIFICANCE): Primary | ICD-10-CM

## 2019-01-03 DIAGNOSIS — R91.8 PULMONARY NODULES: ICD-10-CM

## 2019-01-03 PROCEDURE — 99214 OFFICE O/P EST MOD 30 MIN: CPT | Performed by: INTERNAL MEDICINE

## 2019-01-03 PROCEDURE — G0463 HOSPITAL OUTPT CLINIC VISIT: HCPCS

## 2019-01-03 ASSESSMENT — PAIN SCALES - GENERAL: PAINLEVEL: NO PAIN (0)

## 2019-01-03 ASSESSMENT — MIFFLIN-ST. JEOR: SCORE: 1413.48

## 2019-01-03 NOTE — PATIENT INSTRUCTIONS
1. Labs before next visit- CBC diff, CMP, IFXN, SPEP, light chain, B12. , Scheduled/Tia  2.  RTC MD 6 months, Scheduled/Tia  3- Continue oral B12 2000 mcg once a week  4- CT scan chest within 1 week, Order Faxed to [facility] per patient's request/Tia      Avs printed and given to patient

## 2019-01-03 NOTE — LETTER
"    1/3/2019         RE: Efrem Ramos  [address]  [place] [address]        Dear Colleague,    Thank you for referring your patient, Efrem Ramos, to the [facility]. Please see a copy of my visit note below.    [facility] PHYSICIANS  HEMATOLOGY ONCOLOGY    HEMATOLOGY FOLLOWUP NOTE      DIAGNOSES:   1.  Monoclonal gammopathy of unknown significance.   2.  Anemia.   3.  History of Vocal cord cancer.      TREATMENT:     1. Vitamin B12 supplementation     SUBJECTIVE:  Patient comes in for followup today. He has a vertebroplasty done for compression fracture. He was noted to have right lower lobe nodule noted on CT colonography. He is fatigued.      REVIEW OF SYSTEMS:  A complete review of systems was performed and found to be negative other than pertinent positives mentioned in History of Present Illness.     Past medical, social histories reviewed.    Meds- Reviewed.     PHYSICAL EXAMINATION:   VITAL SIGNS:/74   Pulse 62   Temp 97.7  F (36.5  C) (Oral)   Ht 1.727 m (5' 8\")   Wt 70.4 kg (155 lb 3.2 oz)   SpO2 95%   BMI 23.60 kg/m     CONSTITUTIONAL: Appears tired.  HEENT: Pupils are equal. Oropharynx is clear.   NECK: No cervical or supraclavicular lymphadenopathy.   RESPIRATORY: Clear bilaterally.   CARD/VASC: S1, S2, regular.   GI: Soft, nontender, nondistended, no hepatosplenomegaly.   MUSKULOSKELETAL: Warm, well perfused.   NEUROLOGIC: Alert, awake.   INTEGUMENT: No rash.   LYMPHATICS: No edema.   PSYCH: Mood and affect was normal.     LABORATORY DATA AND IMAGING REVIEWED DURING THIS VISIT:  Recent Labs   Lab Test 12/31/18  1310 10/01/18  1455  09/10/18  0750  09/08/18  1555    138   < > 141   < >  --    POTASSIUM 4.0 4.6   < > 4.3   < >  --    CHLORIDE 104 104   < > 114*   < >  --    CO2 26 25   < > 19*   < >  --    ANIONGAP 10 9   < > 8   < >  --    BUN 12 7   < > 7   < >  --    CR 0.80 0.94   < > 0.69   < >  --    GLC 89 89   < > 95   < >  --    ULISSES 9.0 " 8.9   < > 7.4*   < >  --    MAG  --   --   --  1.8  --  2.8*    < > = values in this interval not displayed.     Recent Labs   Lab Test 12/31/18  1310 10/22/18  0715 09/19/18  1254  09/10/18  0750  09/05/18  1628   WBC 11.1*  --  13.7*  --  11.2*   < > 19.9*   HGB 12.5* 13.5 11.9*   < > 11.2*   < > 14.4    365 367  --  294   < > 366   MCV 96  --  99  --  95   < > 92   NEUTROPHIL 68.4  --  72.3  --   --   --  77.1    < > = values in this interval not displayed.     Recent Labs   Lab Test 12/31/18  1310 09/19/18  1254 09/13/18 09/16/13  1350 08/20/13  1509   BILITOTAL 0.8 0.7 1.0   < > 0.9 0.7   ALKPHOS 92 173* 279*   < > 90 109   ALT 20 57 96*   < > 28 28   AST 21 33 66*   < > 30 28   ALBUMIN 3.3* 3.0* 2.9*   < > 4.1 3.8   LDH  --   --   --   --  453 434    < > = values in this interval not displayed.     Results for YOAV AVITIA (MRN [id number]) as of 1/3/2019 14:24   Ref. Range 3/21/2018 13:03 9/9/2018 06:01 9/19/2018 12:54 12/31/2018 13:10   Gamma Fraction Latest Ref Range: 0.7 - 1.6 g/dL 1.8 (H) 1.6 1.7 (H) 1.6   IGA Latest Ref Range: 70 - 380 mg/dL 419 (H)  435 (H) 360   IGG Latest Ref Range: 695 - 1,620 mg/dL 1,660 (H)  1,590 1,380   IGM Latest Ref Range: 60 - 265 mg/dL 234  255 213   Immunofixation ELP Unknown (Note)  (Note) (Note)   Kappa Free Lt Chain Latest Ref Range: 0.33 - 1.94 mg/dL 3.51 (H)  4.50 (H) 5.10 (H)   Kappa Lambda Ratio Latest Ref Range: 0.26 - 1.65  1.42  1.69 (H) 2.12 (H)   Lambda Free Lt Chain Latest Ref Range: 0.57 - 2.63 mg/dL 2.48  2.67 (H) 2.41   Mitochondrial M2 Antibody IgG Latest Ref Range: <4 U/mL  1     Monoclonal Peak Latest Ref Range: 0.0 g/dL 0.4 (H) 0.5 (H) 0.4 (H) 0.5 (H)     XR BONE SURVEY COMPLETE, 10/4/2018 1:40 PM     Comparison: 8/30/2016 bone survey and abdominal CT dated 9/5/2018.     History: MGUS (monoclonal gammopathy of unknown significance).     Findings:   Skull: Old tim hole again seen in the skull, unchanged. No new lytic  lesions  identified.     Cervical spine: Moderate to severe degenerative changes are seen. No  concerning lesions identified.     Thoracic spine: No fractures are seen. No concerning lesions  identified.     Lumbar spine: L1 compression fracture with approximately 30% vertebral  body height loss is new since abdominal CT dated 9/5/2018, but  otherwise age indeterminate. No other fractures are seen. No definite  lytic lesions identified.     Pelvis and lower extremities: Osteopenia is seen throughout both lower  extremities. Mottled lucencies in the proximal femurs do not appear  significantly changed and may reflect osteopenia.     Chest and ribs: No concerning lesions identified.     Upper extremities: Mottled lucencies in the proximal humeri are again  seen and not significantly changed.                                                                      Impression:   1. L1 compression fracture with 30% vertebral body height loss is new  since abdominal CT dated 9/5/2018.  2. Mottled lucencies are seen in the proximal femurs and proximal  humeri. These are not significantly changed since 8/30/2016 and may  reflect osteopenia.     [Access Center: L1 compression fracture new since 9/5/2018 CT.]     This report will be copied to the [facility] to ensure a  provider acknowledges the finding. Access Center is available Monday  through Friday 8am-3:30 pm.      LINDA ESPINAL MD      ECOG PS: 1    ASSESSMENT:   1- This is a 73-year-old gentleman with monoclonal gammopathy of unknown significance.    Bone marrow biopsy 6/8/17- normo cellular marrow with 0.3% monoclonal plasma cells. Normal flow. One (5%) of the 20 metaphase cells analyzed had a hyperdiploid karyotype   with gains of one extra copy each of chromosomes 5, 7, 9, 15, and 19, and loss of one copy each of chromosomes 13 and 22. 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     - S/P vertebroplasty for compression fracture  -[facility]- some  elevation of light chains. Stable M spike. No clear end organ damage.   - Lung nodule, right lower lobe on CT colonography. He needs a CT of lungs for better assessment.     PLAN:     1. Labs before next visit- CBC diff, CMP, IFXN, SPEP, light chain, B12.   2.  RTC MD 6 months  3- Continue oral B12 2000 mcg once a week  4- CT scan chest within 1 week    SHAE ALDANA MD    1/3/2019    CC: Danny Paige MD          Again, thank you for allowing me to participate in the care of your patient.        Sincerely,        Shae Aldana MD

## 2019-01-03 NOTE — PROGRESS NOTES
"[facility] PHYSICIANS  HEMATOLOGY ONCOLOGY    HEMATOLOGY FOLLOWUP NOTE      DIAGNOSES:   1.  Monoclonal gammopathy of unknown significance.   2.  Anemia.   3.  History of Vocal cord cancer.      TREATMENT:     1. Vitamin B12 supplementation     SUBJECTIVE:  Patient comes in for followup today. He has a vertebroplasty done for compression fracture. He was noted to have right lower lobe nodule noted on CT colonography. He is fatigued.      REVIEW OF SYSTEMS:  A complete review of systems was performed and found to be negative other than pertinent positives mentioned in History of Present Illness.     Past medical, social histories reviewed.    Meds- Reviewed.     PHYSICAL EXAMINATION:   VITAL SIGNS:/74   Pulse 62   Temp 97.7  F (36.5  C) (Oral)   Ht 1.727 m (5' 8\")   Wt 70.4 kg (155 lb 3.2 oz)   SpO2 95%   BMI 23.60 kg/m    CONSTITUTIONAL: Appears tired.  HEENT: Pupils are equal. Oropharynx is clear.   NECK: No cervical or supraclavicular lymphadenopathy.   RESPIRATORY: Clear bilaterally.   CARD/VASC: S1, S2, regular.   GI: Soft, nontender, nondistended, no hepatosplenomegaly.   MUSKULOSKELETAL: Warm, well perfused.   NEUROLOGIC: Alert, awake.   INTEGUMENT: No rash.   LYMPHATICS: No edema.   PSYCH: Mood and affect was normal.     LABORATORY DATA AND IMAGING REVIEWED DURING THIS VISIT:  Recent Labs   Lab Test 12/31/18  1310 10/01/18  1455  09/10/18  0750  09/08/18  1555    138   < > 141   < >  --    POTASSIUM 4.0 4.6   < > 4.3   < >  --    CHLORIDE 104 104   < > 114*   < >  --    CO2 26 25   < > 19*   < >  --    ANIONGAP 10 9   < > 8   < >  --    BUN 12 7   < > 7   < >  --    CR 0.80 0.94   < > 0.69   < >  --    GLC 89 89   < > 95   < >  --    ULISSES 9.0 8.9   < > 7.4*   < >  --    MAG  --   --   --  1.8  --  2.8*    < > = values in this interval not displayed.     Recent Labs   Lab Test 12/31/18  1310 10/22/18  0715 09/19/18  1254  09/10/18  0750  09/05/18  1628   WBC 11.1*  --  13.7*  --  " 11.2*   < > 19.9*   HGB 12.5* 13.5 11.9*   < > 11.2*   < > 14.4    365 367  --  294   < > 366   MCV 96  --  99  --  95   < > 92   NEUTROPHIL 68.4  --  72.3  --   --   --  77.1    < > = values in this interval not displayed.     Recent Labs   Lab Test 12/31/18  1310 09/19/18  1254 09/13/18 09/16/13  1350 08/20/13  1509   BILITOTAL 0.8 0.7 1.0   < > 0.9 0.7   ALKPHOS 92 173* 279*   < > 90 109   ALT 20 57 96*   < > 28 28   AST 21 33 66*   < > 30 28   ALBUMIN 3.3* 3.0* 2.9*   < > 4.1 3.8   LDH  --   --   --   --  453 434    < > = values in this interval not displayed.     Results for YOAV AVITIA (MRN [id number]) as of 1/3/2019 14:24   Ref. Range 3/21/2018 13:03 9/9/2018 06:01 9/19/2018 12:54 12/31/2018 13:10   Gamma Fraction Latest Ref Range: 0.7 - 1.6 g/dL 1.8 (H) 1.6 1.7 (H) 1.6   IGA Latest Ref Range: 70 - 380 mg/dL 419 (H)  435 (H) 360   IGG Latest Ref Range: 695 - 1,620 mg/dL 1,660 (H)  1,590 1,380   IGM Latest Ref Range: 60 - 265 mg/dL 234  255 213   Immunofixation ELP Unknown (Note)  (Note) (Note)   Kappa Free Lt Chain Latest Ref Range: 0.33 - 1.94 mg/dL 3.51 (H)  4.50 (H) 5.10 (H)   Kappa Lambda Ratio Latest Ref Range: 0.26 - 1.65  1.42  1.69 (H) 2.12 (H)   Lambda Free Lt Chain Latest Ref Range: 0.57 - 2.63 mg/dL 2.48  2.67 (H) 2.41   Mitochondrial M2 Antibody IgG Latest Ref Range: <4 U/mL  1     Monoclonal Peak Latest Ref Range: 0.0 g/dL 0.4 (H) 0.5 (H) 0.4 (H) 0.5 (H)     XR BONE SURVEY COMPLETE, 10/4/2018 1:40 PM     Comparison: 8/30/2016 bone survey and abdominal CT dated 9/5/2018.     History: MGUS (monoclonal gammopathy of unknown significance).     Findings:   Skull: Old tim hole again seen in the skull, unchanged. No new lytic  lesions identified.     Cervical spine: Moderate to severe degenerative changes are seen. No  concerning lesions identified.     Thoracic spine: No fractures are seen. No concerning lesions  identified.     Lumbar spine: L1 compression fracture with approximately 30%  vertebral  body height loss is new since abdominal CT dated 9/5/2018, but  otherwise age indeterminate. No other fractures are seen. No definite  lytic lesions identified.     Pelvis and lower extremities: Osteopenia is seen throughout both lower  extremities. Mottled lucencies in the proximal femurs do not appear  significantly changed and may reflect osteopenia.     Chest and ribs: No concerning lesions identified.     Upper extremities: Mottled lucencies in the proximal humeri are again  seen and not significantly changed.                                                                      Impression:   1. L1 compression fracture with 30% vertebral body height loss is new  since abdominal CT dated 9/5/2018.  2. Mottled lucencies are seen in the proximal femurs and proximal  humeri. These are not significantly changed since 8/30/2016 and may  reflect osteopenia.     [Access Center: L1 compression fracture new since 9/5/2018 CT.]     This report will be copied to the [place] [facility] to ensure a  provider acknowledges the finding. Access Center is available Monday  through Friday 8am-3:30 pm.      LINDA ESPINAL MD      ECOG PS: 1    ASSESSMENT:   1- This is a 73-year-old gentleman with monoclonal gammopathy of unknown significance.    Bone marrow biopsy 6/8/17- normo cellular marrow with 0.3% monoclonal plasma cells. Normal flow. One (5%) of the 20 metaphase cells analyzed had a hyperdiploid karyotype   with gains of one extra copy each of chromosomes 5, 7, 9, 15, and 19, and loss of one copy each of chromosomes 13 and 22. 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     - S/P vertebroplasty for compression fracture  -Labs were reviewed withteh patient- some elevation of light chains. Stable M spike. No clear end organ damage.   - Lung nodule, right lower lobe on CT colonography. He needs a CT of lungs for better assessment.     PLAN:     1. Labs before next visit- CBC diff, CMP, IFXN, SPEP, light chain, B12.   2.   RTC MD 6 months  3- Continue oral B12 2000 mcg once a week  4- CT scan chest within 1 week    CASSIDY MAYO MD    1/3/2019    CC: Danny Paige MD

## 2019-01-07 ENCOUNTER — MYC MEDICAL ADVICE (OUTPATIENT)
Dept: FAMILY MEDICINE | Facility: CLINIC | Age: 74
End: 2019-01-07

## 2019-01-07 NOTE — TELEPHONE ENCOUNTER
Will route to  for review. (See [employer] message)    ,    Do you want pt to see you after the PET scan?  Pt scheduled for the scan on 1/10/19.

## 2019-01-10 ENCOUNTER — TRANSFERRED RECORDS (OUTPATIENT)
Dept: HEALTH INFORMATION MANAGEMENT | Facility: CLINIC | Age: 74
End: 2019-01-10

## 2019-01-16 ENCOUNTER — OFFICE VISIT (OUTPATIENT)
Dept: PHARMACY | Facility: CLINIC | Age: 74
End: 2019-01-16
Payer: COMMERCIAL

## 2019-01-16 ENCOUNTER — TELEPHONE (OUTPATIENT)
Dept: PHARMACY | Facility: CLINIC | Age: 74
End: 2019-01-16

## 2019-01-16 ENCOUNTER — OFFICE VISIT (OUTPATIENT)
Dept: FAMILY MEDICINE | Facility: CLINIC | Age: 74
End: 2019-01-16
Payer: MEDICARE

## 2019-01-16 VITALS
TEMPERATURE: 97 F | HEART RATE: 80 BPM | BODY MASS INDEX: 23.64 KG/M2 | DIASTOLIC BLOOD PRESSURE: 71 MMHG | OXYGEN SATURATION: 97 % | SYSTOLIC BLOOD PRESSURE: 109 MMHG | WEIGHT: 156 LBS | HEIGHT: 68 IN

## 2019-01-16 DIAGNOSIS — I48.91 ATRIAL FIBRILLATION AND FLUTTER (H): ICD-10-CM

## 2019-01-16 DIAGNOSIS — G47.00 INSOMNIA, UNSPECIFIED TYPE: ICD-10-CM

## 2019-01-16 DIAGNOSIS — I25.119 CORONARY ARTERY DISEASE INVOLVING NATIVE CORONARY ARTERY OF NATIVE HEART WITH ANGINA PECTORIS (H): ICD-10-CM

## 2019-01-16 DIAGNOSIS — M80.00XA AGE-RELATED OSTEOPOROSIS WITH CURRENT PATHOLOGICAL FRACTURE, INITIAL ENCOUNTER: Primary | ICD-10-CM

## 2019-01-16 DIAGNOSIS — M48.56XD NON-TRAUMATIC COMPRESSION FRACTURE OF L1 LUMBAR VERTEBRA WITH ROUTINE HEALING, SUBSEQUENT ENCOUNTER: ICD-10-CM

## 2019-01-16 DIAGNOSIS — I48.92 ATRIAL FIBRILLATION AND FLUTTER (H): ICD-10-CM

## 2019-01-16 DIAGNOSIS — E43 EDEMA DUE TO MALNUTRITION, DUE TO UNSPECIFIED MALNUTRITION TYPE (H): ICD-10-CM

## 2019-01-16 DIAGNOSIS — I73.9 PVD (PERIPHERAL VASCULAR DISEASE) (H): ICD-10-CM

## 2019-01-16 DIAGNOSIS — C32.0 CANCER OF VOCAL CORD (H): ICD-10-CM

## 2019-01-16 DIAGNOSIS — I27.29 OTHER SECONDARY PULMONARY HYPERTENSION (H): ICD-10-CM

## 2019-01-16 PROCEDURE — 99605 MTMS BY PHARM NP 15 MIN: CPT | Performed by: PHARMACIST

## 2019-01-16 PROCEDURE — 99214 OFFICE O/P EST MOD 30 MIN: CPT | Performed by: INTERNAL MEDICINE

## 2019-01-16 RX ORDER — ZOLEDRONIC ACID 5 MG/100ML
5 INJECTION, SOLUTION INTRAVENOUS ONCE
Status: CANCELLED
Start: 2019-01-16 | End: 2019-01-16

## 2019-01-16 ASSESSMENT — MIFFLIN-ST. JEOR: SCORE: 1417.11

## 2019-01-16 NOTE — Clinical Note
Please see recent CT chest 01/10/19 done at [facility] and can you help to determine if PET scan would be warranted.

## 2019-01-16 NOTE — TELEPHONE ENCOUNTER
Triage team - can you help us put in a therapy plan for Reclast infusions to be done at the [facility]?    See MT note for more details (1/16).    Thanks!  Amparo Antunez, PharmD, [facility]  Medication Therapy Management Provider  Pager: [phone]

## 2019-01-16 NOTE — TELEPHONE ENCOUNTER
I actually don't think I have access to sign a therapy plan, so let's try Dr. Paige.    Pete MurrellD, [facility]  Medication Therapy Management Provider  Pager: [phone]

## 2019-01-16 NOTE — PATIENT INSTRUCTIONS
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  Continue apixiban for stroke prevention  Plan:     (I25.119) Coronary artery disease involving native coronary artery of native heart with angina pectoris (H)  Comment: Also stable - conitnue aspirin, statin and beta blocker   Plan:     (I27.29) Other secondary pulmonary hypertension (H)  Comment: No issues  Plan:     (E43) Edema due to malnutrition, due to unspecified malnutrition type (H)  Comment: Continue to drink Boost and try to increase nutrition to help improved your energy and strength  Plan:      Lung nodules  Comment: Recent CT showing nodules/concern for masses - would recommend discussion in oncology for need for PET scan

## 2019-01-16 NOTE — PROGRESS NOTES
SUBJECTIVE/OBJECTIVE:                Efrem Ramos is a 73 year old male coming in for a follow-up visit for Medication Therapy Management.  He was referred to me from Dr. Paige.  Alfredo was here to see Dr. Paige today and they asked to talk with me.  We are joined by his wife, Mariajose, for our visit today.    Chief Complaint: Follow up from our visit on 11/21/18 and myChart correspondence between now and then.  Here to f/up on treatment of osteoporosis/compression fracture.  This visit serves as an initial visit for 2019.    Tobacco: History of tobacco dependence - quit 2013  Alcohol: not currently using     Medication Adherence/Access: no issues reported    Osteoporosis/Compression Fracture: Current therapy includes: calcium/vitamin D BID. Pt is not experiencing side effects.  Pt would like to proceed with Reclast infusions.  Pt is getting the following sources of dietary calcium: limited  DEXA History: 10/31/18 - IMPRESSION: 1. Prominent osteopenia within L2.  2. Mild osteoporosis within the left femoral neck. 3. The right femoral neck bone density is borderline between osteoporosis and osteopenia.  4. Mild/moderate osteopenia within the distal 33% radius.    Risk factors: recent fracture   Estimated Creatinine Clearance: 79.9 mL/min (based on SCr of 0.8 mg/dL).     Insomnia: Alfredo has been having more trouble with insomnia lately and Mariajose wonders if he can try melatonin.    Today's Vitals: There were no vitals taken for this visit. - vitals taken during PCP immediately before ours:  BP Readings from Last 3 Encounters:   01/16/19 109/71   01/03/19 131/74   11/20/18 110/70      Wt Readings from Last 2 Encounters:   01/16/19 156 lb (70.8 kg)   01/03/19 155 lb 3.2 oz (70.4 kg)      ASSESSMENT:              Current medications were reviewed today as discussed above.      Medication Adherence: good, no issues identified    Osteoporosis/Compression Fracture: Needs Improvement.  Would benefit from starting therapy  for osteoporosis.     Insomnia: Needed additional education, would be ok to try melatonin.     PLAN:                  1.  Will have RN's put in therapy plan for Reclast infusions. Advised pt he should be contacted in the next few days to schedule infusion.  2.  Advised trying 1-3mg of melatonin about 2 hours prior to anticipated bedtime.    I spent 15 minutes with this patient today. All changes were made via collaborative practice agreement with Dr. Paige. A copy of the visit note was provided to the patient's primary care provider.     [facility] in [facility].    The patient was given a summary of these recommendations as an after visit summary.    Amparo Antunez, PharmD, [facility]  Medication Therapy Management Provider  Pager: [phone]

## 2019-01-16 NOTE — PROGRESS NOTES
"[facility]  CLINIC PROGRESS NOTE    Subjective:  MGUS   Efrem Ramos has been following in oncology for MGUS.  He did have a recent compression fracture which was thought to be related to osteoporosis.  He did speak to Amparo about starting Reclast, but has not decided   Lung Opacities   CT scan from [facility] imaging 01/10/19 showed slight increased in right lower lobe lung nodule.  He is breathing OK during the day, but has difficulties with obstructive sleep apnea and can't breath well at night.  He was recommended to consider PET scan.     Past medical history, medications, allergies, social history, family history reviewed and updated in [facility] as of 1/16/2019 .    ROS  CONSTITUTIONAL: no fatigue, no unexpected change in weight  SKIN: no worrisome rashes, no worrisome moles, no worrisome lesions  EYES: no acute vision problems or changes  ENT: no ear problems, no mouth problems, no throat problems  RESP: as above   CV: no chest pain, no palpitations, no new or worsening peripheral edema  GI: no nausea, no vomiting, no constipation, no diarrhea  : no frequency, no dysuria, no hematuria  MS: no claudication, no myalgias, no joint aches  PSYCHIATRIC: no changes in mood or affect      Objective:  Vitals  /71 (BP Location: Right arm, Patient Position: Chair, Cuff Size: Adult Large)   Pulse 80   Temp 97  F (36.1  C) (Oral)   Ht 1.727 m (5' 8\")   Wt 70.8 kg (156 lb)   SpO2 97%   BMI 23.72 kg/m    GEN: Alert Oriented x3 NAD  HEENT: Atraumatic, normocephalic, neck supple, no thyromegaly, negative cervical adenopathy  TM: TM bilaterally pearly and grey with normal light reflex  CV: RRR 2/6 systolic murmur or rubs  PULM: CTA no wheezes or crackles  ABD: Soft, nontender nondistended,  SKIN: No visible skin lesion or ulcerations  EXT:   no edema bilateral lower extremities  NEURO: Gait and station normal - generalized weakness, No focal neurologic deficits  PSYCH: Mood good, affect mood " congruent    No images are attached to the encounter.    No results found for this or any previous visit (from the past 24 hour(s)).    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  Continue apixiban for stroke prevention  Plan:     (I25.119) Coronary artery disease involving native coronary artery of native heart with angina pectoris (H)  Comment: Also stable - conitnue aspirin, statin and beta blocker   Plan:     (I27.29) Other secondary pulmonary hypertension (H)  Comment: No issues  Plan:     (E43) Edema due to malnutrition, due to unspecified malnutrition type (H)  Comment: Continue to drink Boost and try to increase nutrition to help improved your energy and strength  Plan:      Lung nodules  Comment: Recent CT showing nodules/concern for masses - would recommend discussion in oncology for need for PET scan    Follow up in 2 weeks    Disclaimer: This note consists of symbols derived from keyboarding, dictation and/or voice recognition software. As a result, there may be errors in the script that have gone undetected. Please consider this when interpreting information found in this chart.    Danny Paige MD  [phone]

## 2019-01-16 NOTE — TELEPHONE ENCOUNTER
Pended therapy plan for review and signature and routed back to you.  Route to PCP if that is more appropriate.    Thank you,  Kaylan Severino RN

## 2019-01-17 NOTE — TELEPHONE ENCOUNTER
Thank you!  Amparo Antunez, PharmD, [facility]  Medication Therapy Management Provider  Pager: [phone]

## 2019-04-08 DIAGNOSIS — G25.81 RESTLESS LEGS SYNDROME (RLS): ICD-10-CM

## 2019-04-08 RX ORDER — GABAPENTIN 300 MG/1
600 CAPSULE ORAL EVERY EVENING
Qty: 60 CAPSULE | Refills: 5 | Status: SHIPPED | OUTPATIENT
Start: 2019-04-08 | End: 2019-10-14

## 2019-04-23 NOTE — TELEPHONE ENCOUNTER
Reason for Call:  Other appointment    Detailed comments: Mariajose called in to try and get information about Reclast injection that was discussed previously. They don't know when they should be seen & where the the reclast would be at.    Please contact them.    Phone Number Patient can be reached at: Home number on file [phone] (home)    Best Time: any    Can we leave a detailed message on this number? NO    Call taken on 4/23/2019 at 1:30 PM by Jona Amezcua

## 2019-04-24 NOTE — TELEPHONE ENCOUNTER
Sent Rocky [employer] with information for [facility] to call to schedule Reclast.    [phone]     Pete MurrellD, [facility]  Medication Therapy Management Provider  Pager: [phone]

## 2019-06-24 ENCOUNTER — HOSPITAL ENCOUNTER (OUTPATIENT)
Dept: CARDIOLOGY | Facility: CLINIC | Age: 74
Discharge: HOME | End: 2019-06-24
Attending: NURSE PRACTITIONER | Admitting: NURSE PRACTITIONER
Payer: MEDICARE

## 2019-06-24 ENCOUNTER — OFFICE VISIT (OUTPATIENT)
Dept: CARDIOLOGY | Facility: CLINIC | Age: 74
End: 2019-06-24
Attending: NURSE PRACTITIONER
Payer: MEDICARE

## 2019-06-24 VITALS
WEIGHT: 153 LBS | DIASTOLIC BLOOD PRESSURE: 62 MMHG | SYSTOLIC BLOOD PRESSURE: 104 MMHG | BODY MASS INDEX: 23.26 KG/M2 | HEART RATE: 54 BPM

## 2019-06-24 DIAGNOSIS — I35.0 AORTIC VALVE STENOSIS, ETIOLOGY OF CARDIAC VALVE DISEASE UNSPECIFIED: ICD-10-CM

## 2019-06-24 DIAGNOSIS — E78.5 HLD (HYPERLIPIDEMIA): ICD-10-CM

## 2019-06-24 LAB
ALT SERPL W P-5'-P-CCNC: <5 U/L (ref 5–30)
CHOLEST SERPL-MCNC: 118 MG/DL
HDLC SERPL-MCNC: 51 MG/DL
LDLC SERPL CALC-MCNC: 51 MG/DL
NONHDLC SERPL-MCNC: 67 MG/DL
TRIGL SERPL-MCNC: 80 MG/DL

## 2019-06-24 PROCEDURE — 99214 OFFICE O/P EST MOD 30 MIN: CPT | Performed by: INTERNAL MEDICINE

## 2019-06-24 PROCEDURE — 93306 TTE W/DOPPLER COMPLETE: CPT

## 2019-06-24 PROCEDURE — 36415 COLL VENOUS BLD VENIPUNCTURE: CPT | Performed by: INTERNAL MEDICINE

## 2019-06-24 PROCEDURE — 84460 ALANINE AMINO (ALT) (SGPT): CPT | Performed by: INTERNAL MEDICINE

## 2019-06-24 PROCEDURE — 80061 LIPID PANEL: CPT | Performed by: INTERNAL MEDICINE

## 2019-06-24 PROCEDURE — 93306 TTE W/DOPPLER COMPLETE: CPT | Mod: 26 | Performed by: INTERNAL MEDICINE

## 2019-06-24 NOTE — PROGRESS NOTES
HPI and Plan:     Today I had the pleasure of seeing Efrem Ramos at [facility] in [place]. He is a pleasant 73 year old patient with a past medical history of left PCA stroke in 2013, paroxsymal atrial fibrillation/flutter currently on Eliquis, acute disseminated encephalomyelitis in 2003, obstructive sleep apnea, hypertension, hyperlipidemia, colitis, peripheral vascular disease involving rt superficial femoral artery  who presents to the clinic for routine follow-up.  The patient was previously seen by Dr. Chavis and Ms. Marya Ford.  His last visit was on 11/20/2018.    During the last visit with Ms. Ford a transthoracic echocardiogram was ordered six-month follow-up appointment scheduled.  The echocardiogram was completed today and shows severe aortic valve stenosis with aortic valve area of 0.9 cm  with a mean gradient of 17 mmHg.  The stroke volume index was 32 mL/m .  There is also mild aortic insufficiency.  There was mild mitral regurgitation and stenosis with a mean gradient of 3 mmHg at a heart rate of 64 bpm.  There was moderate tricuspid regurgitation.  The patient was in sinus rhythm during the study.     Symptom wise, the patient does report becoming dizzy often, especially while changing positions and having no energy.  He says he has to get up very slowly or else becomes lightheaded and comes close to losing his balance.  For example, today while walking from the third floor elevator to get an echo room the patient had to wait because he was fatigued and dizzy.  He has never passed out or had a syncopal episode.     As for his paroxysmal atrial fibrillation is concerned he was previously on amiodarone which was discontinued due to concern for pulmonary fibrosis.  Coronary angiogram in 07/2016 showed 50% disease in the mid RCA and mid LAD.  IFR was negative and no intervention was performed.  Right heart cath at that time showed pulmonary pressure of  40/18 with a mean of 23, PCWP of 12, RV 35/6, RA 5.     He was also previously seen by Dr. Hoff in vascular clinic in 2015. As per Dr. Maravilla note a peripheral angiogram was performed and sowed long segment of stenosis in the superficial femoral artery on the right with reconstitution of a right below the knee popletial.  As per the note this lesion was not amenable to percutaneous intervention.  There was no other significant vascular disease noted.  He is being conservatively managed for this.     Assessment and plan    1.  Severe aortic stenosis (low flow low gradient)-  There is a discrepancy between the aortic valve area and the mean pressure gradient across the aortic valve raising suspicion for low flow low gradient severe aortic stenosis.  I would perform a dobutamine stress echocardiogram to further evaluate this.  I realize that he has a history of paroxysmal atrial fibrillation there is a small risk of pushing him into A. fib with dobutamine infusion.  However, I think this is an important step in further assessing his symptoms as I believe he may be a symptom from his aortic stenosis.  I will also consider performing a trans-esophageal echocardiogram to further evaluate the aortic stenosis.     2.  Paroxysmal atrial fibrillation with high chads vas score  The patient is currently on Eliquis for anticoagulation for primary prevention.  I will continue him on anti-correlation and also on metoprolol 25 mg p.o. twice daily.    3.  Moderate, nonobstructive two-vessel coronary artery disease  He had disease of the mid RCA and LAD both of which were negative by IFR.  He is currently on Lipitor and metoprolol and I will continue both of these.    4.  Hypertension  Well controlled.    5.  Hyperlipidemia  Blood work from today showed LDL of 51 mg/dL.  ALT is normal    6.  Moderate tricuspid regurgitation, mild mitral regurgitation and mitral stenosis  We will continue to monitor for now.  Unchanged from previous  echo in 2018.      Thank you for allowing me to participate in the care of Efrem Xiong MD  Cardiology    Orders Placed This Encounter   Procedures     Follow-Up with Cardiologist     Echocardiogram Dobutamine Stress       No orders of the defined types were placed in this encounter.      There are no discontinued medications.    Encounter Diagnosis   Name Primary?     Aortic valve stenosis, etiology of cardiac valve disease unspecified        CURRENT MEDICATIONS:  Current Outpatient Medications   Medication Sig Dispense Refill     acetaminophen (TYLENOL) 325 MG tablet Take 325-650 mg by mouth every 6 hours as needed for mild pain       apixaban ANTICOAGULANT (ELIQUIS) 5 MG tablet Take 1 tablet (5 mg) by mouth 2 times daily 180 tablet 2     ASPIRIN PO Take 81 mg by mouth every evening        atorvastatin (LIPITOR) 40 MG tablet Take 1 tablet (40 mg) by mouth daily 90 tablet 2     calcium carbonate 600 mg-vitamin D 400 units (CALTRATE) 600-400 MG-UNIT per tablet Take 1 tablet by mouth 2 times daily       Cyanocobalamin 2000 MCG TABS Take 2,000 mcg by mouth every 7 days On Sundays       gabapentin (NEURONTIN) 300 MG capsule Take 2 capsules (600 mg) by mouth every evening 60 capsule 5     HYDROcodone-acetaminophen (NORCO) 5-325 MG per tablet Take 1 tablet by mouth every 4 hours as needed for pain 18 tablet 0     loperamide (IMODIUM) 2 MG capsule Take 2 mg by mouth 4 times daily as needed for diarrhea       melatonin 1 MG TABS tablet Take 1-3 mg by mouth nightly as needed for sleep       metoprolol succinate (TOPROL-XL) 25 MG 24 hr tablet Take 0.5 tablets (12.5 mg) by mouth daily 45 tablet 2     multivitamin (OCUVITE) TABS Take 1 tablet by mouth 2 times daily        polyethylene glycol (MIRALAX/GLYCOLAX) powder Take 1 capful by mouth daily as needed for constipation       senna-docusate (SENOKOT-S;PERICOLACE) 8.6-50 MG per tablet Take 2 tablets by mouth 2 times daily 120 tablet 3       ALLERGIES      Allergies   Allergen Reactions     Lasix [Furosemide] Other (See Comments)     Throat swelling, wheezing     Sulfa Drugs Difficulty breathing     Adhesive Tape Blisters     Amiodarone Other (See Comments)     Concern regarding chest X-ray     Penicillins Unknown     Reaction occurred as a child       PAST MEDICAL HISTORY:  Past Medical History:   Diagnosis Date     Atrial fibrillation (H)      Benign essential hypertension 11/20/2018     Cancer (H) vocal cord     Carpal tunnel syndrome     abstracted 7/3/02.     CVA (cerebral infarction) 5/5/2015     Demyelinating disease of central nervous system, unspecified (H)     abstracted 7/3/02.     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     Pneumonia 8/17/2016     Redundant colon     needs CT colonography     S/P coronary angiogram 09/05/2017     Shingles      SKIN DISORDERS NEC 3/15/2005     Sleep apnea        PAST SURGICAL HISTORY:  Past Surgical History:   Procedure Laterality Date     BIOPSY  brain 2002     BONE MARROW BIOPSY, BONE SPECIMEN, NEEDLE/TROCAR N/A 6/8/2017    Procedure: BIOPSY BONE MARROW;  UNILATERAL BONE MARROW BIOPSY (CONSCIOUS SEDATION) ;  Surgeon: Jamie Gonzales MD;  Location:  GI     C NONSPECIFIC PROCEDURE  as a child    T & A. abstracted 7/3/02.     C NONSPECIFIC PROCEDURE  early    CTR. abstracted 7/3/02.     ESOPHAGOSCOPY, GASTROSCOPY, DUODENOSCOPY (EGD), COMBINED N/A 9/8/2018    Procedure: COMBINED ESOPHAGOSCOPY, GASTROSCOPY, DUODENOSCOPY (EGD), BIOPSY SINGLE OR MULTIPLE;;  Surgeon: Xander Marie MD;  Location:  GI     HEAD & NECK SURGERY       ORTHOPEDIC SURGERY      right arm ulna reset after injury     THORACOSCOPIC WEDGE RESECTION LUNG Right 8/2/2016    Procedure: THORACOSCOPIC WEDGE RESECTION LUNG;  Surgeon: Abdelrahman Noriega MD;   Location:  OR       FAMILY HISTORY:  Family History   Problem Relation Age of Onset     Blood Disease Mother         Anemia     Cardiovascular Father      Cancer - colorectal Maternal Grandfather      Hypertension Brother      Diabetes Sister 5        Juvinile Diabetes passed at 36     Respiratory Other         Lung Cancer       SOCIAL HISTORY:  Social History     Socioeconomic History     Marital status:      Spouse name: None     Number of children: None     Years of education: None     Highest education level: None   Occupational History     None   Social Needs     Financial resource strain: None     Food insecurity:     Worry: None     Inability: None     Transportation needs:     Medical: None     Non-medical: None   Tobacco Use     Smoking status: Former Smoker     Packs/day: 1.00     Years: 30.00     Pack years: 30.00     Types: Cigarettes     Last attempt to quit: 2013     Years since quittin.9     Smokeless tobacco: Never Used   Substance and Sexual Activity     Alcohol use: Yes     Alcohol/week: 25.2 oz     Types: 42 Standard drinks or equivalent per week     Comment: occ     Drug use: No     Sexual activity: Not Currently   Lifestyle     Physical activity:     Days per week: None     Minutes per session: None     Stress: None   Relationships     Social connections:     Talks on phone: None     Gets together: None     Attends Jew service: None     Active member of club or organization: None     Attends meetings of clubs or organizations: None     Relationship status: None     Intimate partner violence:     Fear of current or ex partner: None     Emotionally abused: None     Physically abused: None     Forced sexual activity: None   Other Topics Concern     Parent/sibling w/ CABG, MI or angioplasty before 65F 55M? Not Asked      Service Not Asked     Blood Transfusions Not Asked     Caffeine Concern Yes     Comment: 1 Coke occasionally      Occupational Exposure Not Asked      Hobby Hazards Not Asked     Sleep Concern Not Asked     Stress Concern Not Asked     Weight Concern Not Asked     Special Diet No     Back Care Not Asked     Exercise No     Bike Helmet Not Asked     Seat Belt Not Asked     Self-Exams Not Asked   Social History Narrative     None       Review of Systems:  Skin:  Negative       Eyes:  Positive for glasses    ENT:  Negative      Respiratory:  Positive for dyspnea on exertion     Cardiovascular:    Positive for;dizziness;fatigue    Gastroenterology: not assessed      Genitourinary:  Negative      Musculoskeletal:  Positive for joint pain;arthritis;muscular weakness    Neurologic:  Positive for stroke;incoordination    Psychiatric:  Positive for sleep disturbances    Heme/Lymph/Imm:  Positive for   Vocal cord CA in 2013.  Endocrine:  Negative        Physical Exam:  Vitals: /62   Pulse 54   Wt 69.4 kg (153 lb)   BMI 23.26 kg/m     Constitutional: awake, alert, no distress  Skin: Warm and dry to touch  Head: Normocephalic, atraumatic  Eyes: Conjunctivae and lids unremarkable, sclera white  ENT: No pallor or cyanosis  Respiratory: Normal breath sounds, clear to auscultation  Cardiac: Regular rate and rhythm, S1 normal, soft S2, delayed carotid upstrokes, 2/6 late peaking systolic murmur  heard best at the right upper sternal border.  No pedal edema.   Extremities and musculoskeletal: No gross motor deficit, Poor pulses on the right lower extremity.  Neurological.  Affect normal  Psych: Alert and oriented x3    Recent Lab Results:  LIPID RESULTS:  Lab Results   Component Value Date    CHOL 118 06/24/2019    HDL 51 06/24/2019    LDL 51 06/24/2019    TRIG 80 06/24/2019    CHOLHDLRATIO 2.7 09/04/2015       LIVER ENZYME RESULTS:  Lab Results   Component Value Date    AST 21 12/31/2018    ALT <5 (L) 06/24/2019       CBC RESULTS:  Lab Results   Component Value Date    WBC 11.1 (H) 12/31/2018    RBC 3.94 (L) 12/31/2018    HGB 12.5 (L) 12/31/2018    HCT 37.8 (L) 12/31/2018     MCV 96 12/31/2018    MCH 31.7 12/31/2018    MCHC 33.1 12/31/2018    RDW 13.7 12/31/2018     12/31/2018       BMP RESULTS:  Lab Results   Component Value Date     12/31/2018    POTASSIUM 4.0 12/31/2018    CHLORIDE 104 12/31/2018    CO2 26 12/31/2018    ANIONGAP 10 12/31/2018    GLC 89 12/31/2018    BUN 12 12/31/2018    CR 0.80 12/31/2018    GFRESTIMATED 87 12/31/2018    GFRESTBLACK >90 12/31/2018    ULISSES 9.0 12/31/2018        A1C RESULTS:  Lab Results   Component Value Date    A1C 5.3 09/04/2015       INR RESULTS:  Lab Results   Component Value Date    INR 1.08 10/22/2018    INR 1.02 09/05/2017       CC  Marya Vela, FABI CNP  [address] [place] [address]    All medical records were reviewed in detail and discussed with the patient. Greater than 35 mins were spent with the patient, 50% of this time was spent on counseling and coordination of care.  After visit summary was printed and given to the patient.

## 2019-06-24 NOTE — LETTER
6/24/2019    Danny Paige MD, MD  [address]  [facility] [address]    RE: Efrem Manuelwell       Dear Colleague,    I had the pleasure of seeing Efrem Ramos in the [facility].    HPI and Plan:     Today I had the pleasure of seeing Efrem Ramos at [facility] in [place]. He is a pleasant 73 year old patient with a past medical history of left PCA stroke in 2013, paroxsymal atrial fibrillation/flutter currently on Eliquis, acute disseminated encephalomyelitis in 2003, obstructive sleep apnea, hypertension, hyperlipidemia, colitis, peripheral vascular disease involving rt superficial femoral artery  who presents to the clinic for routine follow-up.  The patient was previously seen by Dr. Chavis and Ms. Marya Ford.  His last visit was on 11/20/2018.    During the last visit with Ms. Ford a transthoracic echocardiogram was ordered six-month follow-up appointment scheduled.  The echocardiogram was completed today and shows severe aortic valve stenosis with aortic valve area of 0.9 cm  with a mean gradient of 17 mmHg.  The stroke volume index was 32 mL/m .  There is also mild aortic insufficiency.  There was mild mitral regurgitation and stenosis with a mean gradient of 3 mmHg at a heart rate of 64 bpm.  There was moderate tricuspid regurgitation.  The patient was in sinus rhythm during the study.     Symptom wise, the patient does report becoming dizzy often, especially while changing positions and having no energy.  He says he has to get up very slowly or else becomes lightheaded and comes close to losing his balance.  For example, today while walking from the third floor elevator to get an echo room the patient had to wait because he was fatigued and dizzy.  He has never passed out or had a syncopal episode.     As for his paroxysmal atrial fibrillation is concerned he was previously on amiodarone which was discontinued  due to concern for pulmonary fibrosis.  Coronary angiogram in 07/2016 showed 50% disease in the mid RCA and mid LAD.  IFR was negative and no intervention was performed.  Right heart cath at that time showed pulmonary pressure of 40/18 with a mean of 23, PCWP of 12, RV 35/6, RA 5.     He was also previously seen by Dr. Hoff in vascular clinic in 2015. As per Dr. Maravilla note a peripheral angiogram was performed and sowed long segment of stenosis in the superficial femoral artery on the right with reconstitution of a right below the knee popletial.  As per the note this lesion was not amenable to percutaneous intervention.  There was no other significant vascular disease noted.  He is being conservatively managed for this.     Assessment and plan    1.  Severe aortic stenosis (low flow low gradient)-  There is a discrepancy between the aortic valve area and the mean pressure gradient across the aortic valve raising suspicion for low flow low gradient severe aortic stenosis.  I would perform a dobutamine stress echocardiogram to further evaluate this.  I realize that he has a history of paroxysmal atrial fibrillation there is a small risk of pushing him into A. fib with dobutamine infusion.  However, I think this is an important step in further assessing his symptoms as I believe he may be a symptom from his aortic stenosis.  I will also consider performing a trans-esophageal echocardiogram to further evaluate the aortic stenosis.     2.  Paroxysmal atrial fibrillation with high chads vas score  The patient is currently on Eliquis for anticoagulation for primary prevention.  I will continue him on anti-correlation and also on metoprolol 25 mg p.o. twice daily.    3.  Moderate, nonobstructive two-vessel coronary artery disease  He had disease of the mid RCA and LAD both of which were negative by IFR.  He is currently on Lipitor and metoprolol and I will continue both of these.    4.  Hypertension  Well  controlled.    5.  Hyperlipidemia  Blood work from today showed LDL of 51 mg/dL.  ALT is normal    6.  Moderate tricuspid regurgitation, mild mitral regurgitation and mitral stenosis  We will continue to monitor for now.  Unchanged from previous echo in 2018.      Thank you for allowing me to participate in the care of Efrem Xiong MD  Cardiology    Orders Placed This Encounter   Procedures     Follow-Up with Cardiologist     Echocardiogram Dobutamine Stress       No orders of the defined types were placed in this encounter.      There are no discontinued medications.    Encounter Diagnosis   Name Primary?     Aortic valve stenosis, etiology of cardiac valve disease unspecified        CURRENT MEDICATIONS:  Current Outpatient Medications   Medication Sig Dispense Refill     acetaminophen (TYLENOL) 325 MG tablet Take 325-650 mg by mouth every 6 hours as needed for mild pain       apixaban ANTICOAGULANT (ELIQUIS) 5 MG tablet Take 1 tablet (5 mg) by mouth 2 times daily 180 tablet 2     ASPIRIN PO Take 81 mg by mouth every evening        atorvastatin (LIPITOR) 40 MG tablet Take 1 tablet (40 mg) by mouth daily 90 tablet 2     calcium carbonate 600 mg-vitamin D 400 units (CALTRATE) 600-400 MG-UNIT per tablet Take 1 tablet by mouth 2 times daily       Cyanocobalamin 2000 MCG TABS Take 2,000 mcg by mouth every 7 days On Sundays       gabapentin (NEURONTIN) 300 MG capsule Take 2 capsules (600 mg) by mouth every evening 60 capsule 5     HYDROcodone-acetaminophen (NORCO) 5-325 MG per tablet Take 1 tablet by mouth every 4 hours as needed for pain 18 tablet 0     loperamide (IMODIUM) 2 MG capsule Take 2 mg by mouth 4 times daily as needed for diarrhea       melatonin 1 MG TABS tablet Take 1-3 mg by mouth nightly as needed for sleep       metoprolol succinate (TOPROL-XL) 25 MG 24 hr tablet Take 0.5 tablets (12.5 mg) by mouth daily 45 tablet 2     multivitamin (OCUVITE) TABS Take 1 tablet by mouth 2 times daily         polyethylene glycol (MIRALAX/GLYCOLAX) powder Take 1 capful by mouth daily as needed for constipation       senna-docusate (SENOKOT-S;PERICOLACE) 8.6-50 MG per tablet Take 2 tablets by mouth 2 times daily 120 tablet 3       ALLERGIES     Allergies   Allergen Reactions     Lasix [Furosemide] Other (See Comments)     Throat swelling, wheezing     Sulfa Drugs Difficulty breathing     Adhesive Tape Blisters     Amiodarone Other (See Comments)     Concern regarding chest X-ray     Penicillins Unknown     Reaction occurred as a child       PAST MEDICAL HISTORY:  Past Medical History:   Diagnosis Date     Atrial fibrillation (H)      Benign essential hypertension 11/20/2018     Cancer (H) vocal cord     Carpal tunnel syndrome     abstracted 7/3/02.     CVA (cerebral infarction) 5/5/2015     Demyelinating disease of central nervous system, unspecified (H)     abstracted 7/3/02.     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     Pneumonia 8/17/2016     Redundant colon     needs CT colonography     S/P coronary angiogram 09/05/2017     Shingles      SKIN DISORDERS NEC 3/15/2005     Sleep apnea        PAST SURGICAL HISTORY:  Past Surgical History:   Procedure Laterality Date     BIOPSY  brain 2002     BONE MARROW BIOPSY, BONE SPECIMEN, NEEDLE/TROCAR N/A 6/8/2017    Procedure: BIOPSY BONE MARROW;  UNILATERAL BONE MARROW BIOPSY (CONSCIOUS SEDATION) ;  Surgeon: Jamie Gonzales MD;  Location:  GI     C NONSPECIFIC PROCEDURE  as a child    T & A. abstracted 7/3/02.     C NONSPECIFIC PROCEDURE  early    CTR. abstracted 7/3/02.     ESOPHAGOSCOPY, GASTROSCOPY, DUODENOSCOPY (EGD), COMBINED N/A 9/8/2018    Procedure: COMBINED ESOPHAGOSCOPY, GASTROSCOPY, DUODENOSCOPY (EGD), BIOPSY SINGLE OR MULTIPLE;;  Surgeon: Xander Marie MD;  Location:   GI     HEAD & NECK SURGERY       ORTHOPEDIC SURGERY      right arm ulna reset after injury     THORACOSCOPIC WEDGE RESECTION LUNG Right 2016    Procedure: THORACOSCOPIC WEDGE RESECTION LUNG;  Surgeon: Abdelrahman Noriega MD;  Location:  OR       FAMILY HISTORY:  Family History   Problem Relation Age of Onset     Blood Disease Mother         Anemia     Cardiovascular Father      Cancer - colorectal Maternal Grandfather      Hypertension Brother      Diabetes Sister 5        Juvinile Diabetes passed at 36     Respiratory Other         Lung Cancer       SOCIAL HISTORY:  Social History     Socioeconomic History     Marital status:      Spouse name: None     Number of children: None     Years of education: None     Highest education level: None   Occupational History     None   Social Needs     Financial resource strain: None     Food insecurity:     Worry: None     Inability: None     Transportation needs:     Medical: None     Non-medical: None   Tobacco Use     Smoking status: Former Smoker     Packs/day: 1.00     Years: 30.00     Pack years: 30.00     Types: Cigarettes     Last attempt to quit: 2013     Years since quittin.9     Smokeless tobacco: Never Used   Substance and Sexual Activity     Alcohol use: Yes     Alcohol/week: 25.2 oz     Types: 42 Standard drinks or equivalent per week     Comment: occ     Drug use: No     Sexual activity: Not Currently   Lifestyle     Physical activity:     Days per week: None     Minutes per session: None     Stress: None   Relationships     Social connections:     Talks on phone: None     Gets together: None     Attends [employer] service: None     Active member of club or organization: None     Attends meetings of clubs or organizations: None     Relationship status: None     Intimate partner violence:     Fear of current or ex partner: None     Emotionally abused: None     Physically abused: None     Forced sexual activity: None   Other Topics Concern      Parent/sibling w/ CABG, MI or angioplasty before 65F 55M? Not Asked      Service Not Asked     Blood Transfusions Not Asked     Caffeine Concern Yes     Comment: 1 Coke occasionally      Occupational Exposure Not Asked     Hobby Hazards Not Asked     Sleep Concern Not Asked     Stress Concern Not Asked     Weight Concern Not Asked     Special Diet No     Back Care Not Asked     Exercise No     Bike Helmet Not Asked     Seat Belt Not Asked     Self-Exams Not Asked   Social History Narrative     None       Review of Systems:  Skin:  Negative       Eyes:  Positive for glasses    ENT:  Negative      Respiratory:  Positive for dyspnea on exertion     Cardiovascular:    Positive for;dizziness;fatigue    Gastroenterology: not assessed      Genitourinary:  Negative      Musculoskeletal:  Positive for joint pain;arthritis;muscular weakness    Neurologic:  Positive for stroke;incoordination    Psychiatric:  Positive for sleep disturbances    Heme/Lymph/Imm:  Positive for   Vocal cord CA in 2013.  Endocrine:  Negative        Physical Exam:  Vitals: /62   Pulse 54   Wt 69.4 kg (153 lb)   BMI 23.26 kg/m      Constitutional: awake, alert, no distress  Skin: Warm and dry to touch  Head: Normocephalic, atraumatic  Eyes: Conjunctivae and lids unremarkable, sclera white  ENT: No pallor or cyanosis  Respiratory: Normal breath sounds, clear to auscultation  Cardiac: Regular rate and rhythm, S1 normal, soft S2, delayed carotid upstrokes, 2/6 late peaking systolic murmur  heard best at the right upper sternal border.  No pedal edema.   Extremities and musculoskeletal: No gross motor deficit, Poor pulses on the right lower extremity.  Neurological.  Affect normal  Psych: Alert and oriented x3    Recent Lab Results:  LIPID RESULTS:  Lab Results   Component Value Date    CHOL 118 06/24/2019    HDL 51 06/24/2019    LDL 51 06/24/2019    TRIG 80 06/24/2019    CHOLHDLRATIO 2.7 09/04/2015       LIVER ENZYME RESULTS:  Lab  Results   Component Value Date    AST 21 12/31/2018    ALT <5 (L) 06/24/2019       CBC RESULTS:  Lab Results   Component Value Date    WBC 11.1 (H) 12/31/2018    RBC 3.94 (L) 12/31/2018    HGB 12.5 (L) 12/31/2018    HCT 37.8 (L) 12/31/2018    MCV 96 12/31/2018    MCH 31.7 12/31/2018    MCHC 33.1 12/31/2018    RDW 13.7 12/31/2018     12/31/2018       BMP RESULTS:  Lab Results   Component Value Date     12/31/2018    POTASSIUM 4.0 12/31/2018    CHLORIDE 104 12/31/2018    CO2 26 12/31/2018    ANIONGAP 10 12/31/2018    GLC 89 12/31/2018    BUN 12 12/31/2018    CR 0.80 12/31/2018    GFRESTIMATED 87 12/31/2018    GFRESTBLACK >90 12/31/2018    ULISSES 9.0 12/31/2018        A1C RESULTS:  Lab Results   Component Value Date    A1C 5.3 09/04/2015       INR RESULTS:  Lab Results   Component Value Date    INR 1.08 10/22/2018    INR 1.02 09/05/2017       CC  FABI Griffith CNP  [address] [place] [address]    All medical records were reviewed in detail and discussed with the patient. Greater than 35 mins were spent with the patient, 50% of this time was spent on counseling and coordination of care.  After visit summary was printed and given to the patient.        Thank you for allowing me to participate in the care of your patient.      Sincerely,     Severo Xiong MD     [facility]    cc:   FABI Griffith CNP  [address] [place] [address]

## 2019-06-24 NOTE — LETTER
6/24/2019    Danny Paige MD, MD  [address]  [facility] [address]    RE: Efrem Manuelwell       Dear Colleague,    I had the pleasure of seeing Efrem Ramos in the [facility].    HPI and Plan:     Today I had the pleasure of seeing Efrem Ramos at [facility] in [place]. He is a pleasant 73 year old patient with a past medical history of left PCA stroke in 2013, paroxsymal atrial fibrillation/flutter currently on Eliquis, acute disseminated encephalomyelitis in 2003, obstructive sleep apnea, hypertension, hyperlipidemia, colitis, peripheral vascular disease involving rt superficial femoral artery  who presents to the clinic for routine follow-up.  The patient was previously seen by Dr. Chavis and Ms. Marya Ford.  His last visit was on 11/20/2018.    During the last visit with Ms. Ford a transthoracic echocardiogram was ordered six-month follow-up appointment scheduled.  The echocardiogram was completed today and shows severe aortic valve stenosis with aortic valve area of 0.9 cm  with a mean gradient of 17 mmHg.  The stroke volume index was 32 mL/m .  There is also mild aortic insufficiency.  There was mild mitral regurgitation and stenosis with a mean gradient of 3 mmHg at a heart rate of 64 bpm.  There was moderate tricuspid regurgitation.  The patient was in sinus rhythm during the study.     Symptom wise, the patient does report becoming dizzy often, especially while changing positions and having no energy.  He says he has to get up very slowly or else becomes lightheaded and comes close to losing his balance.  For example, today while walking from the third floor elevator to get an echo room the patient had to wait because he was fatigued and dizzy.  He has never passed out or had a syncopal episode.     As for his paroxysmal atrial fibrillation is concerned he was previously on amiodarone which was discontinued  due to concern for pulmonary fibrosis.  Coronary angiogram in 07/2016 showed 50% disease in the mid RCA and mid LAD.  IFR was negative and no intervention was performed.  Right heart cath at that time showed pulmonary pressure of 40/18 with a mean of 23, PCWP of 12, RV 35/6, RA 5.     He was also previously seen by Dr. Hoff in vascular clinic in 2015. As per Dr. Maravilla note a peripheral angiogram was performed and sowed long segment of stenosis in the superficial femoral artery on the right with reconstitution of a right below the knee popletial.  As per the note this lesion was not amenable to percutaneous intervention.  There was no other significant vascular disease noted.  He is being conservatively managed for this.     Assessment and plan    1.  Severe aortic stenosis (low flow low gradient)-  There is a discrepancy between the aortic valve area and the mean pressure gradient across the aortic valve raising suspicion for low flow low gradient severe aortic stenosis.  I would perform a dobutamine stress echocardiogram to further evaluate this.  I realize that he has a history of paroxysmal atrial fibrillation there is a small risk of pushing him into A. fib with dobutamine infusion.  However, I think this is an important step in further assessing his symptoms as I believe he may be a symptom from his aortic stenosis.  I will also consider performing a trans-esophageal echocardiogram to further evaluate the aortic stenosis.     2.  Paroxysmal atrial fibrillation with high chads vas score  The patient is currently on Eliquis for anticoagulation for primary prevention.  I will continue him on anti-correlation and also on metoprolol 25 mg p.o. twice daily.    3.  Moderate, nonobstructive two-vessel coronary artery disease  He had disease of the mid RCA and LAD both of which were negative by IFR.  He is currently on Lipitor and metoprolol and I will continue both of these.    4.  Hypertension  Well  controlled.    5.  Hyperlipidemia  Blood work from today showed LDL of 51 mg/dL.  ALT is normal    6.  Moderate tricuspid regurgitation, mild mitral regurgitation and mitral stenosis  We will continue to monitor for now.  Unchanged from previous echo in 2018.      Thank you for allowing me to participate in the care of Efrem Xiong MD  Cardiology    Orders Placed This Encounter   Procedures     Follow-Up with Cardiologist     Echocardiogram Dobutamine Stress       No orders of the defined types were placed in this encounter.      There are no discontinued medications.    Encounter Diagnosis   Name Primary?     Aortic valve stenosis, etiology of cardiac valve disease unspecified        CURRENT MEDICATIONS:  Current Outpatient Medications   Medication Sig Dispense Refill     acetaminophen (TYLENOL) 325 MG tablet Take 325-650 mg by mouth every 6 hours as needed for mild pain       apixaban ANTICOAGULANT (ELIQUIS) 5 MG tablet Take 1 tablet (5 mg) by mouth 2 times daily 180 tablet 2     ASPIRIN PO Take 81 mg by mouth every evening        atorvastatin (LIPITOR) 40 MG tablet Take 1 tablet (40 mg) by mouth daily 90 tablet 2     calcium carbonate 600 mg-vitamin D 400 units (CALTRATE) 600-400 MG-UNIT per tablet Take 1 tablet by mouth 2 times daily       Cyanocobalamin 2000 MCG TABS Take 2,000 mcg by mouth every 7 days On Sundays       gabapentin (NEURONTIN) 300 MG capsule Take 2 capsules (600 mg) by mouth every evening 60 capsule 5     HYDROcodone-acetaminophen (NORCO) 5-325 MG per tablet Take 1 tablet by mouth every 4 hours as needed for pain 18 tablet 0     loperamide (IMODIUM) 2 MG capsule Take 2 mg by mouth 4 times daily as needed for diarrhea       melatonin 1 MG TABS tablet Take 1-3 mg by mouth nightly as needed for sleep       metoprolol succinate (TOPROL-XL) 25 MG 24 hr tablet Take 0.5 tablets (12.5 mg) by mouth daily 45 tablet 2     multivitamin (OCUVITE) TABS Take 1 tablet by mouth 2 times daily         polyethylene glycol (MIRALAX/GLYCOLAX) powder Take 1 capful by mouth daily as needed for constipation       senna-docusate (SENOKOT-S;PERICOLACE) 8.6-50 MG per tablet Take 2 tablets by mouth 2 times daily 120 tablet 3       ALLERGIES     Allergies   Allergen Reactions     Lasix [Furosemide] Other (See Comments)     Throat swelling, wheezing     Sulfa Drugs Difficulty breathing     Adhesive Tape Blisters     Amiodarone Other (See Comments)     Concern regarding chest X-ray     Penicillins Unknown     Reaction occurred as a child       PAST MEDICAL HISTORY:  Past Medical History:   Diagnosis Date     Atrial fibrillation (H)      Benign essential hypertension 11/20/2018     Cancer (H) vocal cord     Carpal tunnel syndrome     abstracted 7/3/02.     CVA (cerebral infarction) 5/5/2015     Demyelinating disease of central nervous system, unspecified (H)     abstracted 7/3/02.     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     Pneumonia 8/17/2016     Redundant colon     needs CT colonography     S/P coronary angiogram 09/05/2017     Shingles      SKIN DISORDERS NEC 3/15/2005     Sleep apnea        PAST SURGICAL HISTORY:  Past Surgical History:   Procedure Laterality Date     BIOPSY  brain 2002     BONE MARROW BIOPSY, BONE SPECIMEN, NEEDLE/TROCAR N/A 6/8/2017    Procedure: BIOPSY BONE MARROW;  UNILATERAL BONE MARROW BIOPSY (CONSCIOUS SEDATION) ;  Surgeon: Jamie Gonzales MD;  Location:  GI     C NONSPECIFIC PROCEDURE  as a child    T & A. abstracted 7/3/02.     C NONSPECIFIC PROCEDURE  early    CTR. abstracted 7/3/02.     ESOPHAGOSCOPY, GASTROSCOPY, DUODENOSCOPY (EGD), COMBINED N/A 9/8/2018    Procedure: COMBINED ESOPHAGOSCOPY, GASTROSCOPY, DUODENOSCOPY (EGD), BIOPSY SINGLE OR MULTIPLE;;  Surgeon: Xander Marie MD;  Location:   GI     HEAD & NECK SURGERY       ORTHOPEDIC SURGERY      right arm ulna reset after injury     THORACOSCOPIC WEDGE RESECTION LUNG Right 2016    Procedure: THORACOSCOPIC WEDGE RESECTION LUNG;  Surgeon: Abdelrahman Noriega MD;  Location:  OR       FAMILY HISTORY:  Family History   Problem Relation Age of Onset     Blood Disease Mother         Anemia     Cardiovascular Father      Cancer - colorectal Maternal Grandfather      Hypertension Brother      Diabetes Sister 5        Juvinile Diabetes passed at 36     Respiratory Other         Lung Cancer       SOCIAL HISTORY:  Social History     Socioeconomic History     Marital status:      Spouse name: None     Number of children: None     Years of education: None     Highest education level: None   Occupational History     None   Social Needs     Financial resource strain: None     Food insecurity:     Worry: None     Inability: None     Transportation needs:     Medical: None     Non-medical: None   Tobacco Use     Smoking status: Former Smoker     Packs/day: 1.00     Years: 30.00     Pack years: 30.00     Types: Cigarettes     Last attempt to quit: 2013     Years since quittin.9     Smokeless tobacco: Never Used   Substance and Sexual Activity     Alcohol use: Yes     Alcohol/week: 25.2 oz     Types: 42 Standard drinks or equivalent per week     Comment: occ     Drug use: No     Sexual activity: Not Currently   Lifestyle     Physical activity:     Days per week: None     Minutes per session: None     Stress: None   Relationships     Social connections:     Talks on phone: None     Gets together: None     Attends [employer] service: None     Active member of club or organization: None     Attends meetings of clubs or organizations: None     Relationship status: None     Intimate partner violence:     Fear of current or ex partner: None     Emotionally abused: None     Physically abused: None     Forced sexual activity: None   Other Topics Concern      Parent/sibling w/ CABG, MI or angioplasty before 65F 55M? Not Asked      Service Not Asked     Blood Transfusions Not Asked     Caffeine Concern Yes     Comment: 1 Coke occasionally      Occupational Exposure Not Asked     Hobby Hazards Not Asked     Sleep Concern Not Asked     Stress Concern Not Asked     Weight Concern Not Asked     Special Diet No     Back Care Not Asked     Exercise No     Bike Helmet Not Asked     Seat Belt Not Asked     Self-Exams Not Asked   Social History Narrative     None       Review of Systems:  Skin:  Negative       Eyes:  Positive for glasses    ENT:  Negative      Respiratory:  Positive for dyspnea on exertion     Cardiovascular:    Positive for;dizziness;fatigue    Gastroenterology: not assessed      Genitourinary:  Negative      Musculoskeletal:  Positive for joint pain;arthritis;muscular weakness    Neurologic:  Positive for stroke;incoordination    Psychiatric:  Positive for sleep disturbances    Heme/Lymph/Imm:  Positive for   Vocal cord CA in 2013.  Endocrine:  Negative        Physical Exam:  Vitals: /62   Pulse 54   Wt 69.4 kg (153 lb)   BMI 23.26 kg/m      Constitutional: awake, alert, no distress  Skin: Warm and dry to touch  Head: Normocephalic, atraumatic  Eyes: Conjunctivae and lids unremarkable, sclera white  ENT: No pallor or cyanosis  Respiratory: Normal breath sounds, clear to auscultation  Cardiac: Regular rate and rhythm, S1 normal, soft S2, delayed carotid upstrokes, 2/6 late peaking systolic murmur  heard best at the right upper sternal border.  No pedal edema.   Extremities and musculoskeletal: No gross motor deficit, Poor pulses on the right lower extremity.  Neurological.  Affect normal  Psych: Alert and oriented x3    Recent Lab Results:  LIPID RESULTS:  Lab Results   Component Value Date    CHOL 118 06/24/2019    HDL 51 06/24/2019    LDL 51 06/24/2019    TRIG 80 06/24/2019    CHOLHDLRATIO 2.7 09/04/2015       LIVER ENZYME RESULTS:  Lab  Results   Component Value Date    AST 21 12/31/2018    ALT <5 (L) 06/24/2019       CBC RESULTS:  Lab Results   Component Value Date    WBC 11.1 (H) 12/31/2018    RBC 3.94 (L) 12/31/2018    HGB 12.5 (L) 12/31/2018    HCT 37.8 (L) 12/31/2018    MCV 96 12/31/2018    MCH 31.7 12/31/2018    MCHC 33.1 12/31/2018    RDW 13.7 12/31/2018     12/31/2018       BMP RESULTS:  Lab Results   Component Value Date     12/31/2018    POTASSIUM 4.0 12/31/2018    CHLORIDE 104 12/31/2018    CO2 26 12/31/2018    ANIONGAP 10 12/31/2018    GLC 89 12/31/2018    BUN 12 12/31/2018    CR 0.80 12/31/2018    GFRESTIMATED 87 12/31/2018    GFRESTBLACK >90 12/31/2018    ULISSES 9.0 12/31/2018        A1C RESULTS:  Lab Results   Component Value Date    A1C 5.3 09/04/2015       INR RESULTS:  Lab Results   Component Value Date    INR 1.08 10/22/2018    INR 1.02 09/05/2017       CC  Marya Vela, FABI CNP  [address] [place] [address]    All medical records were reviewed in detail and discussed with the patient. Greater than 35 mins were spent with the patient, 50% of this time was spent on counseling and coordination of care.  After visit summary was printed and given to the patient.      Thank you for allowing me to participate in the care of your patient.    Sincerely,     Severo Xiong MD     [facility]

## 2019-07-01 ENCOUNTER — HOSPITAL ENCOUNTER (OUTPATIENT)
Dept: CARDIOLOGY | Facility: CLINIC | Age: 74
End: 2019-07-01
Attending: INTERNAL MEDICINE
Payer: MEDICARE

## 2019-07-01 ENCOUNTER — HOSPITAL ENCOUNTER (OUTPATIENT)
Facility: CLINIC | Age: 74
Discharge: HOME | End: 2019-07-01
Attending: INTERNAL MEDICINE | Admitting: INTERNAL MEDICINE
Payer: MEDICARE

## 2019-07-01 VITALS — BODY MASS INDEX: 23.46 KG/M2 | WEIGHT: 154.32 LBS

## 2019-07-01 DIAGNOSIS — I35.0 AORTIC VALVE STENOSIS, ETIOLOGY OF CARDIAC VALVE DISEASE UNSPECIFIED: ICD-10-CM

## 2019-07-01 PROCEDURE — 40000863 ZZH STATISTIC RADIOLOGY XRAY, US, CT, MAR, NM

## 2019-07-01 PROCEDURE — 25000128 H RX IP 250 OP 636: Performed by: INTERNAL MEDICINE

## 2019-07-01 PROCEDURE — 25800030 ZZH RX IP 258 OP 636: Performed by: INTERNAL MEDICINE

## 2019-07-01 PROCEDURE — 25000125 ZZHC RX 250: Performed by: INTERNAL MEDICINE

## 2019-07-01 PROCEDURE — 93016 CV STRESS TEST SUPVJ ONLY: CPT | Performed by: INTERNAL MEDICINE

## 2019-07-01 PROCEDURE — 93321 DOPPLER ECHO F-UP/LMTD STD: CPT | Mod: 26 | Performed by: INTERNAL MEDICINE

## 2019-07-01 PROCEDURE — 93350 STRESS TTE ONLY: CPT | Mod: 26 | Performed by: INTERNAL MEDICINE

## 2019-07-01 PROCEDURE — 93018 CV STRESS TEST I&R ONLY: CPT | Performed by: INTERNAL MEDICINE

## 2019-07-01 PROCEDURE — 93325 DOPPLER ECHO COLOR FLOW MAPG: CPT | Mod: 26 | Performed by: INTERNAL MEDICINE

## 2019-07-01 PROCEDURE — 93350 STRESS TTE ONLY: CPT | Mod: TC

## 2019-07-01 RX ORDER — SODIUM CHLORIDE 9 MG/ML
INJECTION, SOLUTION INTRAVENOUS CONTINUOUS
Status: DISCONTINUED | OUTPATIENT
Start: 2019-07-01 | End: 2019-07-01 | Stop reason: HOSPADM

## 2019-07-01 RX ORDER — DOBUTAMINE HYDROCHLORIDE 200 MG/100ML
5-20 INJECTION INTRAVENOUS CONTINUOUS
Status: DISCONTINUED | OUTPATIENT
Start: 2019-07-01 | End: 2019-07-01 | Stop reason: HOSPADM

## 2019-07-01 RX ORDER — ATROPINE SULFATE 0.1 MG/ML
.2-2 INJECTION INTRAVENOUS
Status: DISCONTINUED | OUTPATIENT
Start: 2019-07-01 | End: 2019-07-01 | Stop reason: HOSPADM

## 2019-07-01 RX ORDER — METOPROLOL TARTRATE 1 MG/ML
1-20 INJECTION, SOLUTION INTRAVENOUS
Status: DISCONTINUED | OUTPATIENT
Start: 2019-07-01 | End: 2019-07-01 | Stop reason: HOSPADM

## 2019-07-01 RX ADMIN — SODIUM CHLORIDE: 9 INJECTION, SOLUTION INTRAVENOUS at 14:00

## 2019-07-01 RX ADMIN — METOPROLOL TARTRATE 2.5 MG: 5 INJECTION, SOLUTION INTRAVENOUS at 14:51

## 2019-07-01 RX ADMIN — DOBUTAMINE IN DEXTROSE 5 MCG/KG/MIN: 200 INJECTION, SOLUTION INTRAVENOUS at 14:28

## 2019-07-01 NOTE — PROGRESS NOTES
Pre-procedure - [facility]     Dobutamine Stress Test: Pt tolerated well. VSS.  Aortic Stenosis protocol used. Pt denied chest pain, pressure or SOB prior to test. During procedure pt reported no symptoms even when HR in 160's. Rhythm converted to A fib after gtt shut off. Metoprolol 2.5 mg IV given per VO Dr Malhotra.    Dr Malhotra spoke with pt pre & post procedure.    1510 Pt discharged per w/c to lobby by EKG tech. All personal belongings taken with pt. Pt states that his wife is waiting for him in the lobby.

## 2019-07-02 ENCOUNTER — HOSPITAL ENCOUNTER (OUTPATIENT)
Facility: CLINIC | Age: 74
Setting detail: SPECIMEN
Discharge: HOME | End: 2019-07-02
Attending: INTERNAL MEDICINE | Admitting: INTERNAL MEDICINE
Payer: MEDICARE

## 2019-07-02 ENCOUNTER — INFUSION THERAPY VISIT (OUTPATIENT)
Dept: INFUSION THERAPY | Facility: CLINIC | Age: 74
End: 2019-07-02
Attending: INTERNAL MEDICINE
Payer: MEDICARE

## 2019-07-02 DIAGNOSIS — D47.2 MGUS (MONOCLONAL GAMMOPATHY OF UNKNOWN SIGNIFICANCE): ICD-10-CM

## 2019-07-02 LAB
ALBUMIN SERPL-MCNC: 3.1 G/DL (ref 3.4–5)
ALP SERPL-CCNC: 96 U/L (ref 40–150)
ALT SERPL W P-5'-P-CCNC: 16 U/L (ref 0–70)
ANION GAP SERPL CALCULATED.3IONS-SCNC: 4 MMOL/L (ref 3–14)
AST SERPL W P-5'-P-CCNC: 18 U/L (ref 0–45)
BASOPHILS # BLD AUTO: 0.1 10E9/L (ref 0–0.2)
BASOPHILS NFR BLD AUTO: 0.6 %
BILIRUB SERPL-MCNC: 0.9 MG/DL (ref 0.2–1.3)
BUN SERPL-MCNC: 17 MG/DL (ref 7–30)
CALCIUM SERPL-MCNC: 8.9 MG/DL (ref 8.5–10.1)
CHLORIDE SERPL-SCNC: 107 MMOL/L (ref 94–109)
CO2 SERPL-SCNC: 30 MMOL/L (ref 20–32)
CREAT SERPL-MCNC: 0.83 MG/DL (ref 0.66–1.25)
DIFFERENTIAL METHOD BLD: ABNORMAL
EOSINOPHIL # BLD AUTO: 0.2 10E9/L (ref 0–0.7)
EOSINOPHIL NFR BLD AUTO: 1.9 %
ERYTHROCYTE [DISTWIDTH] IN BLOOD BY AUTOMATED COUNT: 14 % (ref 10–15)
GFR SERPL CREATININE-BSD FRML MDRD: 85 ML/MIN/{1.73_M2}
GLUCOSE SERPL-MCNC: 82 MG/DL (ref 70–99)
HCT VFR BLD AUTO: 35.9 % (ref 40–53)
HGB BLD-MCNC: 12.4 G/DL (ref 13.3–17.7)
IMM GRANULOCYTES # BLD: 0 10E9/L (ref 0–0.4)
IMM GRANULOCYTES NFR BLD: 0.1 %
LYMPHOCYTES # BLD AUTO: 2.8 10E9/L (ref 0.8–5.3)
LYMPHOCYTES NFR BLD AUTO: 22.5 %
MCH RBC QN AUTO: 32.8 PG (ref 26.5–33)
MCHC RBC AUTO-ENTMCNC: 34.5 G/DL (ref 31.5–36.5)
MCV RBC AUTO: 95 FL (ref 78–100)
MONOCYTES # BLD AUTO: 1.1 10E9/L (ref 0–1.3)
MONOCYTES NFR BLD AUTO: 8.7 %
NEUTROPHILS # BLD AUTO: 8.3 10E9/L (ref 1.6–8.3)
NEUTROPHILS NFR BLD AUTO: 66.2 %
NRBC # BLD AUTO: 0 10*3/UL
NRBC BLD AUTO-RTO: 0 /100
PLATELET # BLD AUTO: 321 10E9/L (ref 150–450)
POTASSIUM SERPL-SCNC: 2.8 MMOL/L (ref 3.4–5.3)
PROT SERPL-MCNC: 7.6 G/DL (ref 6.8–8.8)
RBC # BLD AUTO: 3.78 10E12/L (ref 4.4–5.9)
SODIUM SERPL-SCNC: 141 MMOL/L (ref 133–144)
WBC # BLD AUTO: 12.5 10E9/L (ref 4–11)

## 2019-07-02 PROCEDURE — 85025 COMPLETE CBC W/AUTO DIFF WBC: CPT | Performed by: INTERNAL MEDICINE

## 2019-07-02 PROCEDURE — 83883 ASSAY NEPHELOMETRY NOT SPEC: CPT | Performed by: INTERNAL MEDICINE

## 2019-07-02 PROCEDURE — 80053 COMPREHEN METABOLIC PANEL: CPT | Performed by: INTERNAL MEDICINE

## 2019-07-02 PROCEDURE — 82784 ASSAY IGA/IGD/IGG/IGM EACH: CPT | Performed by: INTERNAL MEDICINE

## 2019-07-02 PROCEDURE — 86334 IMMUNOFIX E-PHORESIS SERUM: CPT | Performed by: INTERNAL MEDICINE

## 2019-07-02 PROCEDURE — 36415 COLL VENOUS BLD VENIPUNCTURE: CPT

## 2019-07-02 PROCEDURE — 84165 PROTEIN E-PHORESIS SERUM: CPT | Performed by: INTERNAL MEDICINE

## 2019-07-02 PROCEDURE — 00000402 ZZHCL STATISTIC TOTAL PROTEIN: Performed by: INTERNAL MEDICINE

## 2019-07-02 NOTE — PROGRESS NOTES
Medical Assistant Note:  Efrem Ramos presents today for blood draw.    Patient seen by provider today: No.   present during visit today: Not Applicable.    Concerns: No Concerns.    Procedure:  Lab draw site: LAC, Needle type: BF, Gauge: 23g with gauze and coban.    Post Assessment:  Labs drawn without difficulty: Yes.    Discharge Plan:  Departure Mode: Ambulatory.    Face to Face Time: 5.    Camille Mccann

## 2019-07-03 DIAGNOSIS — E87.6 HYPOKALEMIA: Primary | ICD-10-CM

## 2019-07-03 LAB
ALBUMIN SERPL ELPH-MCNC: 3.5 G/DL (ref 3.7–5.1)
ALPHA1 GLOB SERPL ELPH-MCNC: 0.4 G/DL (ref 0.2–0.4)
ALPHA2 GLOB SERPL ELPH-MCNC: 0.9 G/DL (ref 0.5–0.9)
B-GLOBULIN SERPL ELPH-MCNC: 0.8 G/DL (ref 0.6–1)
GAMMA GLOB SERPL ELPH-MCNC: 1.9 G/DL (ref 0.7–1.6)
IGA SERPL-MCNC: 402 MG/DL (ref 70–380)
IGG SERPL-MCNC: 1760 MG/DL (ref 695–1620)
IGM SERPL-MCNC: 201 MG/DL (ref 60–265)
M PROTEIN SERPL ELPH-MCNC: 0.7 G/DL
PROT PATTERN SERPL ELPH-IMP: ABNORMAL
PROT PATTERN SERPL IFE-IMP: ABNORMAL

## 2019-07-03 RX ORDER — POTASSIUM CHLORIDE 750 MG/1
40 TABLET, EXTENDED RELEASE ORAL DAILY
Qty: 12 TABLET | Refills: 0 | Status: SHIPPED | OUTPATIENT
Start: 2019-07-03 | End: 2019-08-14

## 2019-07-04 LAB
KAPPA LC FREE SER-MCNC: 6.81 MG/DL (ref 0.33–1.94)
KAPPA LC FREE/LAMBDA FREE SER NEPH: 2.86 {RATIO} (ref 0.26–1.65)
LAMBDA LC FREE SERPL-MCNC: 2.38 MG/DL (ref 0.57–2.63)

## 2019-07-05 DIAGNOSIS — I35.0 NONRHEUMATIC AORTIC VALVE STENOSIS: ICD-10-CM

## 2019-07-05 DIAGNOSIS — I48.91 ATRIAL FIBRILLATION AND FLUTTER (H): ICD-10-CM

## 2019-07-05 DIAGNOSIS — I48.92 ATRIAL FIBRILLATION AND FLUTTER (H): ICD-10-CM

## 2019-07-05 RX ORDER — METOPROLOL SUCCINATE 25 MG/1
12.5 TABLET, EXTENDED RELEASE ORAL DAILY
Qty: 45 TABLET | Refills: 3 | Status: ON HOLD | OUTPATIENT
Start: 2019-07-05 | End: 2020-03-23

## 2019-07-10 ENCOUNTER — ONCOLOGY VISIT (OUTPATIENT)
Dept: ONCOLOGY | Facility: CLINIC | Age: 74
End: 2019-07-10
Attending: INTERNAL MEDICINE
Payer: MEDICARE

## 2019-07-10 ENCOUNTER — HOSPITAL ENCOUNTER (OUTPATIENT)
Facility: CLINIC | Age: 74
Setting detail: SPECIMEN
Discharge: HOME | End: 2019-07-10
Attending: INTERNAL MEDICINE | Admitting: INTERNAL MEDICINE
Payer: MEDICARE

## 2019-07-10 VITALS
WEIGHT: 155.3 LBS | HEIGHT: 68 IN | BODY MASS INDEX: 23.54 KG/M2 | TEMPERATURE: 98.1 F | DIASTOLIC BLOOD PRESSURE: 77 MMHG | SYSTOLIC BLOOD PRESSURE: 110 MMHG | OXYGEN SATURATION: 98 % | HEART RATE: 76 BPM

## 2019-07-10 DIAGNOSIS — R91.8 PULMONARY NODULES: ICD-10-CM

## 2019-07-10 DIAGNOSIS — D47.2 MGUS (MONOCLONAL GAMMOPATHY OF UNKNOWN SIGNIFICANCE): Primary | ICD-10-CM

## 2019-07-10 LAB — POTASSIUM SERPL-SCNC: 3.6 MMOL/L (ref 3.4–5.3)

## 2019-07-10 PROCEDURE — 36415 COLL VENOUS BLD VENIPUNCTURE: CPT

## 2019-07-10 PROCEDURE — 84132 ASSAY OF SERUM POTASSIUM: CPT | Performed by: INTERNAL MEDICINE

## 2019-07-10 PROCEDURE — G0463 HOSPITAL OUTPT CLINIC VISIT: HCPCS

## 2019-07-10 PROCEDURE — 99214 OFFICE O/P EST MOD 30 MIN: CPT | Performed by: INTERNAL MEDICINE

## 2019-07-10 ASSESSMENT — MIFFLIN-ST. JEOR: SCORE: 1408.94

## 2019-07-10 ASSESSMENT — PAIN SCALES - GENERAL: PAINLEVEL: NO PAIN (0)

## 2019-07-10 NOTE — PATIENT INSTRUCTIONS
1. Labs before next visit- CBC diff, CMP, IFXN, SPEP, light chain, B12.   2.  RTC MD 6 months  3- Continue oral B12 2000 mcg once a week  4- CT scan chest within 1 week  5- Schedule an appointment with Dr. Abad for lung nodules  6- Potassium level today / Complete Kadi Macias CMA    7- Please obtain CT scan images from 1/2019/ Sub IMaging- Called

## 2019-07-10 NOTE — PROGRESS NOTES
"[facility]  HEMATOLOGY ONCOLOGY    HEMATOLOGY FOLLOWUP NOTE      DIAGNOSES:   1.  Monoclonal gammopathy of unknown significance.   2.  Anemia.   3.  History of Vocal cord cancer.      TREATMENT:     1. Vitamin B12 supplementation     SUBJECTIVE:  Patient comes in for followup today. He has been at his baseline. No bone pain, no weight loss. No respiratory symptoms.      REVIEW OF SYSTEMS:  A complete review of systems was performed and found to be negative other than pertinent positives mentioned in History of Present Illness.     Past medical, social histories reviewed.    Meds- Reviewed.     PHYSICAL EXAMINATION:   VITAL SIGNS:/77   Pulse 76   Temp 98.1  F (36.7  C)   Ht 1.727 m (5' 8\")   Wt 70.4 kg (155 lb 4.8 oz)   SpO2 98%   BMI 23.61 kg/m    CONSTITUTIONAL: Appears tired.  HEENT: Pupils are equal. Oropharynx is clear.   NECK: No cervical or supraclavicular lymphadenopathy.   RESPIRATORY: Clear bilaterally.   CARD/VASC: S1, S2, regular.   GI: Soft, nontender, nondistended, no hepatosplenomegaly.   MUSKULOSKELETAL: Warm, well perfused.   NEUROLOGIC: Alert, awake.   INTEGUMENT: No rash.   LYMPHATICS: Bilateral edema.   PSYCH: Mood and affect was normal.     LABORATORY DATA AND IMAGING REVIEWED DURING THIS VISIT:  Recent Labs   Lab Test 07/02/19  1329 12/31/18  1310  09/10/18  0750  09/08/18  1555    140   < > 141   < >  --    POTASSIUM 2.8* 4.0   < > 4.3   < >  --    CHLORIDE 107 104   < > 114*   < >  --    CO2 30 26   < > 19*   < >  --    ANIONGAP 4 10   < > 8   < >  --    BUN 17 12   < > 7   < >  --    CR 0.83 0.80   < > 0.69   < >  --    GLC 82 89   < > 95   < >  --    ULISSES 8.9 9.0   < > 7.4*   < >  --    MAG  --   --   --  1.8  --  2.8*    < > = values in this interval not displayed.     Recent Labs   Lab Test 07/02/19  1329 12/31/18  1310 10/22/18  0715 09/19/18  1254   WBC 12.5* 11.1*  --  13.7*   HGB 12.4* 12.5* 13.5 11.9*    282 365 367   MCV 95 96  --  99 "   NEUTROPHIL 66.2 68.4  --  72.3     Recent Labs   Lab Test 07/02/19  1329 06/24/19  1023 12/31/18  1310 09/19/18  1254  09/16/13  1350 08/20/13  1509   BILITOTAL 0.9  --  0.8 0.7   < > 0.9 0.7   ALKPHOS 96  --  92 173*   < > 90 109   ALT 16 <5* 20 57   < > 28 28   AST 18  --  21 33   < > 30 28   ALBUMIN 3.1*  --  3.3* 3.0*   < > 4.1 3.8   LDH  --   --   --   --   --  453 434    < > = values in this interval not displayed.     Results for YOAV AVITIA (MRN [id number]) as of 7/10/2019 13:31   Ref. Range 9/19/2018 12:54 12/31/2018 13:10 7/2/2019 13:29   Gamma Fraction Latest Ref Range: 0.7 - 1.6 g/dL 1.7 (H) 1.6 1.9 (H)   IGA Latest Ref Range: 70 - 380 mg/dL 435 (H) 360 402 (H)   IGG Latest Ref Range: 695 - 1,620 mg/dL 1,590 1,380 1,760 (H)   IGM Latest Ref Range: 60 - 265 mg/dL 255 213 201   Immunofixation ELP Unknown (Note) (Note) (Note)   Kappa Free Lt Chain Latest Ref Range: 0.33 - 1.94 mg/dL 4.50 (H) 5.10 (H)    Kappa Lambda Ratio Latest Ref Range: 0.26 - 1.65  1.69 (H) 2.12 (H)    Lambda Free Lt Chain Latest Ref Range: 0.57 - 2.63 mg/dL 2.67 (H) 2.41    Monoclonal Peak Latest Ref Range: 0.0 g/dL 0.4 (H) 0.5 (H) 0.7 (H)       Results for orders placed or performed during the hospital encounter of 10/31/18   DX Wrist Heel Radius    Narrative    BONE DENSITY (DEXA) October 31, 2018 2:11 PM    HISTORY: Male patient with osteoporosis.    COMPARISON: None.    TECHNIQUE: The study was performed using DEXA in the AP lumbar spine,  AP distal radius and AP femur. In accordance with the ISCD  (International Society of Clinical Densitometry), the lowest BMD  between the total hip and femoral neck will be used.     FINDINGS: Using DEXA, the results were reported according to T-score.  The T-score is the standard deviation from the peak bone mass in a  normal young patient. A T-score of 0 to -1.0 is normal. A T-score of  -1.0 to -2.5 correlates with osteopenia. A T-score of less than -2.5  correlates with osteoporosis.       The L2 T-score is -2.2 and I suspect false elevation at the other  levels. The femoral neck T-scores are -2.8 on the left and -2.5 on the  right. The distal 33% radius T-score is -1.5.      Impression    IMPRESSION:  1. Prominent osteopenia within L2.  2. Mild osteoporosis within the left femoral neck.  3. The right femoral neck bone density is borderline between  osteoporosis and osteopenia.   4. Mild/moderate osteopenia within the distal 33% radius.      ARACELIS TABOR MD       ECOG PS: 1    ASSESSMENT:   1- This is a 74-year-old gentleman with monoclonal gammopathy of unknown significance.    Bone marrow biopsy 6/8/17- normo cellular marrow with 0.3% monoclonal plasma cells. Normal flow. One (5%) of the 20 metaphase cells analyzed had a hyperdiploid karyotype   with gains of one extra copy each of chromosomes 5, 7, 9, 15, and 19, and loss of one copy each of chromosomes 13 and 22. 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     - S/P vertebroplasty for compression fracture. Also has osteoporosis (addressed by primary care).   -Labs were reviewed withteh patient- some elevation of light chains. M spike has gone up to 0.7.   Kappa light chains 6.81  K/L ratio 2.86    - Lung nodule, right lower lobe on CT colonography.  CT 1/2019 showed 1.4X0.9 cm right lower lobe nodule, 0.3 cm right lower lobe nodule. Right upper lobe 1.5X1.0 cm nodule.   Needs another scan and evaluation by pulmonary.   - Hypokalemia, he had oral replacement. We will check the levels today.     PLAN:     1. Labs before next visit- CBC diff, CMP, IFXN, SPEP, light chain, B12.   2.  RTC MD 6 months  3- Continue oral B12 2000 mcg once a week  4- CT scan chest within 1 week  5- Schedule an appointment with Dr. Abad for lung nodules  6- Potassium level today  7- Please obtain CT scan images from 1/2019    CASSIDY MAYO MD    7/10/2019    CC: Danny Paige MD

## 2019-07-10 NOTE — LETTER
"    7/10/2019         RE: Efrem Ramos  [address]  [facility] [address]        Dear Colleague,    Thank you for referring your patient, Efrem Ramos, to the [facility]. Please see a copy of my visit note below.    [facility] PHYSICIANS  HEMATOLOGY ONCOLOGY    HEMATOLOGY FOLLOWUP NOTE      DIAGNOSES:   1.  Monoclonal gammopathy of unknown significance.   2.  Anemia.   3.  History of Vocal cord cancer.      TREATMENT:     1. Vitamin B12 supplementation     SUBJECTIVE:  Patient comes in for followup today. He has been at his baseline. No bone pain, no weight loss. No respiratory symptoms.      REVIEW OF SYSTEMS:  A complete review of systems was performed and found to be negative other than pertinent positives mentioned in History of Present Illness.     Past medical, social histories reviewed.    Meds- Reviewed.     PHYSICAL EXAMINATION:   VITAL SIGNS:/77   Pulse 76   Temp 98.1  F (36.7  C)   Ht 1.727 m (5' 8\")   Wt 70.4 kg (155 lb 4.8 oz)   SpO2 98%   BMI 23.61 kg/m     CONSTITUTIONAL: Appears tired.  HEENT: Pupils are equal. Oropharynx is clear.   NECK: No cervical or supraclavicular lymphadenopathy.   RESPIRATORY: Clear bilaterally.   CARD/VASC: S1, S2, regular.   GI: Soft, nontender, nondistended, no hepatosplenomegaly.   MUSKULOSKELETAL: Warm, well perfused.   NEUROLOGIC: Alert, awake.   INTEGUMENT: No rash.   LYMPHATICS: Bilateral edema.   PSYCH: Mood and affect was normal.     LABORATORY DATA AND IMAGING REVIEWED DURING THIS VISIT:  Recent Labs   Lab Test 07/02/19  1329 12/31/18  1310  09/10/18  0750  09/08/18  1555    140   < > 141   < >  --    POTASSIUM 2.8* 4.0   < > 4.3   < >  --    CHLORIDE 107 104   < > 114*   < >  --    CO2 30 26   < > 19*   < >  --    ANIONGAP 4 10   < > 8   < >  --    BUN 17 12   < > 7   < >  --    CR 0.83 0.80   < > 0.69   < >  --    GLC 82 89   < > 95   < >  --    ULISSES 8.9 9.0   < > 7.4*   < >  --    MAG  --   --   --  1.8  -- "  2.8*    < > = values in this interval not displayed.     Recent Labs   Lab Test 07/02/19  1329 12/31/18  1310 10/22/18  0715 09/19/18  1254   WBC 12.5* 11.1*  --  13.7*   HGB 12.4* 12.5* 13.5 11.9*    282 365 367   MCV 95 96  --  99   NEUTROPHIL 66.2 68.4  --  72.3     Recent Labs   Lab Test 07/02/19  1329 06/24/19  1023 12/31/18  1310 09/19/18  1254  09/16/13  1350 08/20/13  1509   BILITOTAL 0.9  --  0.8 0.7   < > 0.9 0.7   ALKPHOS 96  --  92 173*   < > 90 109   ALT 16 <5* 20 57   < > 28 28   AST 18  --  21 33   < > 30 28   ALBUMIN 3.1*  --  3.3* 3.0*   < > 4.1 3.8   LDH  --   --   --   --   --  453 434    < > = values in this interval not displayed.     Results for YOAV AVITIA (MRN [id number]) as of 7/10/2019 13:31   Ref. Range 9/19/2018 12:54 12/31/2018 13:10 7/2/2019 13:29   Gamma Fraction Latest Ref Range: 0.7 - 1.6 g/dL 1.7 (H) 1.6 1.9 (H)   IGA Latest Ref Range: 70 - 380 mg/dL 435 (H) 360 402 (H)   IGG Latest Ref Range: 695 - 1,620 mg/dL 1,590 1,380 1,760 (H)   IGM Latest Ref Range: 60 - 265 mg/dL 255 213 201   Immunofixation ELP Unknown (Note) (Note) (Note)   Kappa Free Lt Chain Latest Ref Range: 0.33 - 1.94 mg/dL 4.50 (H) 5.10 (H)    Kappa Lambda Ratio Latest Ref Range: 0.26 - 1.65  1.69 (H) 2.12 (H)    Lambda Free Lt Chain Latest Ref Range: 0.57 - 2.63 mg/dL 2.67 (H) 2.41    Monoclonal Peak Latest Ref Range: 0.0 g/dL 0.4 (H) 0.5 (H) 0.7 (H)       Results for orders placed or performed during the hospital encounter of 10/31/18   DX Wrist Heel Radius    Narrative    BONE DENSITY (DEXA) October 31, 2018 2:11 PM    HISTORY: Male patient with osteoporosis.    COMPARISON: None.    TECHNIQUE: The study was performed using DEXA in the AP lumbar spine,  AP distal radius and AP femur. In accordance with the ISCD  (International Society of Clinical Densitometry), the lowest BMD  between the total hip and femoral neck will be used.     FINDINGS: Using DEXA, the results were reported according to  T-score.  The T-score is the standard deviation from the peak bone mass in a  normal young patient. A T-score of 0 to -1.0 is normal. A T-score of  -1.0 to -2.5 correlates with osteopenia. A T-score of less than -2.5  correlates with osteoporosis.      The L2 T-score is -2.2 and I suspect false elevation at the other  levels. The femoral neck T-scores are -2.8 on the left and -2.5 on the  right. The distal 33% radius T-score is -1.5.      Impression    IMPRESSION:  1. Prominent osteopenia within L2.  2. Mild osteoporosis within the left femoral neck.  3. The right femoral neck bone density is borderline between  osteoporosis and osteopenia.   4. Mild/moderate osteopenia within the distal 33% radius.      ARACELIS TABOR MD       ECOG PS: 1    ASSESSMENT:   1- This is a 74-year-old gentleman with monoclonal gammopathy of unknown significance.    Bone marrow biopsy 6/8/17- normo cellular marrow with 0.3% monoclonal plasma cells. Normal flow. One (5%) of the 20 metaphase cells analyzed had a hyperdiploid karyotype   with gains of one extra copy each of chromosomes 5, 7, 9, 15, and 19, and loss of one copy each of chromosomes 13 and 22. 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     - S/P vertebroplasty for compression fracture. Also has osteoporosis (addressed by primary care).   -Labs were reviewed withteh patient- some elevation of light chains. M spike has gone up to 0.7.   Kappa light chains 6.81  K/L ratio 2.86    - Lung nodule, right lower lobe on CT colonography.  CT 1/2019 showed 1.4X0.9 cm right lower lobe nodule, 0.3 cm right lower lobe nodule. Right upper lobe 1.5X1.0 cm nodule.   Needs another scan and evaluation by pulmonary.   - Hypokalemia, he had oral replacement. We will check the levels today.     PLAN:     1. Labs before next visit- CBC diff, CMP, IFXN, SPEP, light chain, B12.   2.  RTC MD 6 months  3- Continue oral B12 2000 mcg once a week  4- CT scan chest within 1 week  5- Schedule an appointment with   Jenniffer for lung nodules  6- Potassium level today  7- Please obtain CT scan images from 1/2019    CASSIDY ALDANA MD    7/10/2019    CC: Danny Paige MD          Again, thank you for allowing me to participate in the care of your patient.        Sincerely,        Cassidy Aldana MD

## 2019-07-11 NOTE — TELEPHONE ENCOUNTER
RECORDS STATUS - ALL OTHER DIAGNOSIS      RECORDS RECEIVED FROM: Epic/   DATE RECEIVED: 8/12/19   NOTES STATUS DETAILS   OFFICE NOTE from referring provider Complete  Dr. Aldana   OFFICE NOTE from medical oncologist Complete  EPIC   DISCHARGE SUMMARY from hospital Complete  Epic 6/8/17   DISCHARGE REPORT from the ER N/A    OPERATIVE REPORT Complete  EPIC   MEDICATION LIST Complete  Epic/   CLINICAL TRIAL TREATMENTS TO DATE N/A    LABS     PATHOLOGY REPORTS Complete  EPIC   ANYTHING RELATED TO DIAGNOSIS Complete  Recent Labs in Epic    GENONOMIC TESTING     TYPE:     IMAGING (NEED IMAGES & REPORT)     CT SCANS Complete  PACS   Xray  Complete  PACS   MRI     MAMMO     ULTRASOUND     PET

## 2019-07-16 ENCOUNTER — HOSPITAL ENCOUNTER (OUTPATIENT)
Dept: CT IMAGING | Facility: CLINIC | Age: 74
Discharge: HOME | End: 2019-07-16
Attending: INTERNAL MEDICINE | Admitting: INTERNAL MEDICINE
Payer: MEDICARE

## 2019-07-16 DIAGNOSIS — R91.8 PULMONARY NODULES: ICD-10-CM

## 2019-07-16 PROCEDURE — 71250 CT THORAX DX C-: CPT

## 2019-07-26 ENCOUNTER — OFFICE VISIT (OUTPATIENT)
Dept: CARDIOLOGY | Facility: CLINIC | Age: 74
End: 2019-07-26
Attending: INTERNAL MEDICINE
Payer: MEDICARE

## 2019-07-26 VITALS
HEIGHT: 68 IN | WEIGHT: 155 LBS | HEART RATE: 54 BPM | SYSTOLIC BLOOD PRESSURE: 103 MMHG | BODY MASS INDEX: 23.49 KG/M2 | DIASTOLIC BLOOD PRESSURE: 63 MMHG

## 2019-07-26 DIAGNOSIS — I35.0 AORTIC VALVE STENOSIS, ETIOLOGY OF CARDIAC VALVE DISEASE UNSPECIFIED: ICD-10-CM

## 2019-07-26 PROCEDURE — 99214 OFFICE O/P EST MOD 30 MIN: CPT | Performed by: INTERNAL MEDICINE

## 2019-07-26 ASSESSMENT — MIFFLIN-ST. JEOR: SCORE: 1407.58

## 2019-07-26 NOTE — PROGRESS NOTES
HPI and Plan:     Today I had the pleasure of seeing Efrem Ramos at [facility] in [place]. He is a pleasant 73 year old patient with a past medical history of left PCA stroke in 2013, paroxsymal atrial fibrillation/flutter currently on Eliquis, acute disseminated encephalomyelitis in 2003, obstructive sleep apnea, hypertension, hyperlipidemia, colitis, peripheral vascular disease involving rt superficial femoral artery, moderate tricuspid regurgitation and mild MS/MR who presents to the clinic for routine follow-up.  The patient was previously seen by Dr. Chavis and Ms. Marya Ford.  His last visit with me was on 6/24/2019. A TTE prior to that visit showed severe aortic valve stenosis with aortic valve area of 0.9 cm  with a mean gradient of 17 mmHg and SVI of 32 mL/m  single flow low gradient severe aortic stenosis.  I ordered a low-dose dobutamine stress echocardiogram to further evaluate low flow low gradient AS.  During that study the gradient increased from 20 mmHg at rest to 30 mmHg at peak infusion of 15 mcg.  The test was stopped due to development of rate with atrial fibrillation.  The patient was not symptomatic during the test.  He is here today in the clinic to follow-up on the results and for further management planning.     He continues to be symptomatic and reports becoming dizzy often, especially when changing positions, quickly.  He also reports being fatigued and tired all the time.  He needs multiple naps during the day. He says he has to get up very slowly or else becomes lightheaded and comes close to losing his balance. He has never passed out or had a syncopal episode.    As far as paroxysmal atrial fibrillation is concerned he was previously on amiodarone which was discontinued due to concern for pulmonary fibrosis.  Coronary angiogram in 07/2016 showed 50% disease in the mid RCA and mid LAD.  IFR was negative and no intervention was performed.  Right  heart cath at that time showed pulmonary pressure of 40/18 with a mean of 23, PCWP of 12, RV 35/6, RA 5.     He also has peripheral vascular disease and was previously seen by Dr. Hoff in vascular clinic in 2015. As per Dr. Maravilla note a peripheral angiogram was performed and sowed long segment of stenosis in the rt superficial femoral artery.  As per the note this lesion was not amenable to percutaneous intervention.  There was no other significant vascular disease noted.  He is being conservatively managed for this.     As stated above, the most recent echocardiogram showed there was also mild aortic insufficiency.  There was mild mitral regurgitation and stenosis with a mean gradient of 3 mmHg at a heart rate of 64 bpm.  There was moderate tricuspid regurgitation.  The patient was in sinus rhythm during the study.   Symptom wise, the patient does report becoming dizzy often, especially while changing positions and having no energy.       Assessment and plan    1.  Severe aortic stenosis (paradoxical low flow low gradient)-  There was an increase in gradient during the stress echocardiogram to 30 mmHg at peak infusion of 15 mcg of dobutamine at a stroke volume index of 41 mL/m2.  The test had to be stopped because of development of atrial fibrillation.  I will refer him to TAVR clinic for further work-up.  I am not certain if he is going to be a TAVR candidate due to presence of peripheral vascular disease and possible access site issues.  This will be w/u with a CT scan as part of TAVR w/u. He also has other valvular issues which I think patient needs further investigation.  These include mitral stenosis/mitral regurgitation, tricuspid regurgitation.  I will perform trans-esophageal echocardiogram to further evaluate his valvular disease.      I explained the procedure (EMILIANO) to the patient.  I explained the risks and benefits and answered all his questions to the best of my ability.  The patient verbally  consented to undergo the procedure.    2.  Paroxysmal atrial fibrillation with high chads vas score  The patient is currently on Eliquis for anticoagulation for primary prevention.  I will continue him on AC and also on metoprolol 25 mg p.o. twice daily.    3.  Moderate, nonobstructive two-vessel coronary artery disease  He had disease of the mid RCA and LAD both of which were negative by IFR.  He is currently on Lipitor and metoprolol and I will continue both of these.    4.  Hypertension  Well controlled.    5.  Hyperlipidemia  Blood work from today showed LDL of 51 mg/dL.  ALT is normal    6.  Moderate tricuspid regurgitation, mild mitral regurgitation and mitral stenosis  EMILIANO for further w/u.     Thank you for allowing me to participate in the care of Efrem Xiong MD  Cardiology    Orders Placed This Encounter   Procedures     Cardiac Structural Heart Clinic     Follow-Up with Cardiologist     Transesophageal Echocardiogram       No orders of the defined types were placed in this encounter.      There are no discontinued medications.    Encounter Diagnosis   Name Primary?     Aortic valve stenosis, etiology of cardiac valve disease unspecified        CURRENT MEDICATIONS:  Current Outpatient Medications   Medication Sig Dispense Refill     acetaminophen (TYLENOL) 325 MG tablet Take 325-650 mg by mouth every 6 hours as needed for mild pain       apixaban ANTICOAGULANT (ELIQUIS) 5 MG tablet Take 1 tablet (5 mg) by mouth 2 times daily 180 tablet 3     ASPIRIN PO Take 81 mg by mouth every evening        atorvastatin (LIPITOR) 40 MG tablet Take 1 tablet (40 mg) by mouth daily 90 tablet 2     calcium carbonate 600 mg-vitamin D 400 units (CALTRATE) 600-400 MG-UNIT per tablet Take 1 tablet by mouth 2 times daily       Cyanocobalamin 2000 MCG TABS Take 2,000 mcg by mouth every 7 days On Sundays       gabapentin (NEURONTIN) 300 MG capsule Take 2 capsules (600 mg) by mouth every evening 60 capsule 5      loperamide (IMODIUM) 2 MG capsule Take 2 mg by mouth 4 times daily as needed for diarrhea       melatonin 1 MG TABS tablet Take 1-3 mg by mouth nightly as needed for sleep       metoprolol succinate ER (TOPROL-XL) 25 MG 24 hr tablet Take 0.5 tablets (12.5 mg) by mouth daily 45 tablet 3     multivitamin (OCUVITE) TABS Take 1 tablet by mouth 2 times daily        polyethylene glycol (MIRALAX/GLYCOLAX) powder Take 1 capful by mouth daily as needed for constipation       senna-docusate (SENOKOT-S;PERICOLACE) 8.6-50 MG per tablet Take 2 tablets by mouth 2 times daily 120 tablet 3       ALLERGIES     Allergies   Allergen Reactions     Lasix [Furosemide] Other (See Comments) and Swelling     Throat swelling, wheezing     Sulfa Drugs Difficulty breathing     Adhesive Tape Blisters     Amiodarone Other (See Comments)     Developed pleural effusion     Penicillins Unknown     Reaction occurred as a child       PAST MEDICAL HISTORY:  Past Medical History:   Diagnosis Date     Atrial fibrillation (H)      Benign essential hypertension 11/20/2018     Cancer (H) vocal cord     Carpal tunnel syndrome     abstracted 7/3/02.     CVA (cerebral infarction) 5/5/2015     Demyelinating disease of central nervous system, unspecified (H)     abstracted 7/3/02.     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     Pneumonia 8/17/2016     Redundant colon     needs CT colonography     S/P coronary angiogram 09/05/2017     Shingles      SKIN DISORDERS NEC 3/15/2005     Sleep apnea        PAST SURGICAL HISTORY:  Past Surgical History:   Procedure Laterality Date     BIOPSY  brain 2002     BONE MARROW BIOPSY, BONE SPECIMEN, NEEDLE/TROCAR N/A 6/8/2017    Procedure: BIOPSY BONE MARROW;  UNILATERAL BONE MARROW BIOPSY (CONSCIOUS SEDATION) ;  Surgeon: Jamie Gonzales MD;   Location:  GI     C NONSPECIFIC PROCEDURE  as a child    T & A. abstracted 7/3/02.     C NONSPECIFIC PROCEDURE  early    CTR. abstracted 7/3/02.     ESOPHAGOSCOPY, GASTROSCOPY, DUODENOSCOPY (EGD), COMBINED N/A 2018    Procedure: COMBINED ESOPHAGOSCOPY, GASTROSCOPY, DUODENOSCOPY (EGD), BIOPSY SINGLE OR MULTIPLE;;  Surgeon: Xander Marie MD;  Location:  GI     HEAD & NECK SURGERY       ORTHOPEDIC SURGERY      right arm ulna reset after injury     THORACOSCOPIC WEDGE RESECTION LUNG Right 2016    Procedure: THORACOSCOPIC WEDGE RESECTION LUNG;  Surgeon: Abdelrahman Noriega MD;  Location:  OR       FAMILY HISTORY:  Family History   Problem Relation Age of Onset     Blood Disease Mother         Anemia     Cardiovascular Father      Cancer - colorectal Maternal Grandfather      Hypertension Brother      Diabetes Sister 5        Juvinile Diabetes passed at 36     Respiratory Other         Lung Cancer       SOCIAL HISTORY:  Social History     Socioeconomic History     Marital status:      Spouse name: None     Number of children: None     Years of education: None     Highest education level: None   Occupational History     None   Social Needs     Financial resource strain: None     Food insecurity:     Worry: None     Inability: None     Transportation needs:     Medical: None     Non-medical: None   Tobacco Use     Smoking status: Former Smoker     Packs/day: 1.00     Years: 30.00     Pack years: 30.00     Types: Cigarettes     Last attempt to quit: 2013     Years since quittin.0     Smokeless tobacco: Never Used   Substance and Sexual Activity     Alcohol use: Yes     Alcohol/week: 25.2 oz     Types: 42 Standard drinks or equivalent per week     Comment: occ     Drug use: No     Sexual activity: Not Currently   Lifestyle     Physical activity:     Days per week: None     Minutes per session: None     Stress: None   Relationships     Social connections:     Talks on phone: None     " Gets together: None     Attends [employer] service: None     Active member of club or organization: None     Attends meetings of clubs or organizations: None     Relationship status: None     Intimate partner violence:     Fear of current or ex partner: None     Emotionally abused: None     Physically abused: None     Forced sexual activity: None   Other Topics Concern     Parent/sibling w/ CABG, MI or angioplasty before 65F 55M? Not Asked      Service Not Asked     Blood Transfusions Not Asked     Caffeine Concern Yes     Comment: 1 Coke occasionally      Occupational Exposure Not Asked     Hobby Hazards Not Asked     Sleep Concern Not Asked     Stress Concern Not Asked     Weight Concern Not Asked     Special Diet No     Back Care Not Asked     Exercise No     Bike Helmet Not Asked     Seat Belt Not Asked     Self-Exams Not Asked   Social History Narrative     None       Review of Systems:  Skin:  Negative       Eyes:  Positive for glasses    ENT:  Negative      Respiratory:  Positive for dyspnea on exertion     Cardiovascular:    Positive for;dizziness;fatigue    Gastroenterology: not assessed      Genitourinary:  Negative      Musculoskeletal:  Positive for joint pain;arthritis;muscular weakness    Neurologic:  Positive for stroke;incoordination    Psychiatric:  Positive for sleep disturbances    Heme/Lymph/Imm:  Positive for   Vocal cord CA in 2013.  Endocrine:  Negative        Physical Exam:  Vitals: /63   Pulse 54   Ht 1.727 m (5' 8\")   Wt 70.3 kg (155 lb)   BMI 23.57 kg/m     Constitutional: awake, alert, no distress  Skin: Warm and dry to touch  Head: Normocephalic, atraumatic  Eyes: Conjunctivae and lids unremarkable, sclera white  ENT: No pallor or cyanosis  Respiratory: Normal breath sounds, clear to auscultation  Cardiac: Regular rate and rhythm, S1 normal, soft S2, delayed carotid upstrokes, 2/6 late peaking systolic murmur  heard best at the right upper sternal border.  No pedal " edema.   Extremities and musculoskeletal: No gross motor deficit, Poor pulses on the right lower extremity.  Neurological.  Affect normal  Psych: Alert and oriented x3    Recent Lab Results:  LIPID RESULTS:  Lab Results   Component Value Date    CHOL 118 06/24/2019    HDL 51 06/24/2019    LDL 51 06/24/2019    TRIG 80 06/24/2019    CHOLHDLRATIO 2.7 09/04/2015       LIVER ENZYME RESULTS:  Lab Results   Component Value Date    AST 18 07/02/2019    ALT 16 07/02/2019       CBC RESULTS:  Lab Results   Component Value Date    WBC 12.5 (H) 07/02/2019    RBC 3.78 (L) 07/02/2019    HGB 12.4 (L) 07/02/2019    HCT 35.9 (L) 07/02/2019    MCV 95 07/02/2019    MCH 32.8 07/02/2019    MCHC 34.5 07/02/2019    RDW 14.0 07/02/2019     07/02/2019       BMP RESULTS:  Lab Results   Component Value Date     07/02/2019    POTASSIUM 3.6 07/10/2019    CHLORIDE 107 07/02/2019    CO2 30 07/02/2019    ANIONGAP 4 07/02/2019    GLC 82 07/02/2019    BUN 17 07/02/2019    CR 0.83 07/02/2019    GFRESTIMATED 85 07/02/2019    GFRESTBLACK >90 07/02/2019    ULISSES 8.9 07/02/2019        A1C RESULTS:  Lab Results   Component Value Date    A1C 5.3 09/04/2015       INR RESULTS:  Lab Results   Component Value Date    INR 1.08 10/22/2018    INR 1.02 09/05/2017       CC  FABI Griffith CNP  [address] [place] [address]    All medical records were reviewed in detail and discussed with the patient. Greater than 35 mins were spent with the patient, 50% of this time was spent on counseling and coordination of care.  After visit summary was printed and given to the patient.

## 2019-07-26 NOTE — LETTER
7/26/2019    Danny Paige MD, MD  [address]  [facility] [address]    RE: Efrem DUMONT Richard       Dear Colleague,    I had the pleasure of seeing Efrem Ramos in the [facility].    HPI and Plan:     Today I had the pleasure of seeing Efrem Ramos at [facility] in [place]. He is a pleasant 73 year old patient with a past medical history of left PCA stroke in 2013, paroxsymal atrial fibrillation/flutter currently on Eliquis, acute disseminated encephalomyelitis in 2003, obstructive sleep apnea, hypertension, hyperlipidemia, colitis, peripheral vascular disease involving rt superficial femoral artery, moderate tricuspid regurgitation and mild MS/MR who presents to the clinic for routine follow-up.  The patient was previously seen by Dr. Chavis and Ms. Marya Ford.  His last visit with me was on 6/24/2019. A TTE prior to that visit showed severe aortic valve stenosis with aortic valve area of 0.9 cm  with a mean gradient of 17 mmHg and SVI of 32 mL/m   single flow low gradient severe aortic stenosis.  I ordered a low-dose dobutamine stress echocardiogram to further evaluate low flow low gradient AS.  During that study the gradient increased from 20 mmHg at rest to 30 mmHg at peak infusion of 15 mcg.  The test was stopped due to development of rate with atrial fibrillation.  The patient was not symptomatic during the test.  He is here today in the clinic to follow-up on the results and for further management planning.     He continues to be symptomatic and reports becoming dizzy often, especially when changing positions, quickly.  He also reports being fatigued and tired all the time.  He needs multiple naps during the day. He says he has to get up very slowly or else becomes lightheaded and comes close to losing his balance. He has never passed out or had a syncopal episode.    As far as paroxysmal atrial fibrillation is concerned  he was previously on amiodarone which was discontinued due to concern for pulmonary fibrosis.  Coronary angiogram in 07/2016 showed 50% disease in the mid RCA and mid LAD.  IFR was negative and no intervention was performed.  Right heart cath at that time showed pulmonary pressure of 40/18 with a mean of 23, PCWP of 12, RV 35/6, RA 5.     He also has peripheral vascular disease and was previously seen by Dr. Hoff in vascular clinic in 2015. As per Dr. Maravilla note a peripheral angiogram was performed and sowed long segment of stenosis in the rt superficial femoral artery.  As per the note this lesion was not amenable to percutaneous intervention.  There was no other significant vascular disease noted.  He is being conservatively managed for this.     As stated above, the most recent echocardiogram showed there was also mild aortic insufficiency.  There was mild mitral regurgitation and stenosis with a mean gradient of 3 mmHg at a heart rate of 64 bpm.  There was moderate tricuspid regurgitation.  The patient was in sinus rhythm during the study.   Symptom wise, the patient does report becoming dizzy often, especially while changing positions and having no energy.       Assessment and plan    1.  Severe aortic stenosis (paradoxical low flow low gradient)-  There was an increase in gradient during the stress echocardiogram to 30 mmHg at peak infusion of 15 mcg of dobutamine at a stroke volume index of 41 mL/m2.  The test had to be stopped because of development of atrial fibrillation.  I will refer him to TAVR clinic for further work-up.  I am not certain if he is going to be a TAVR candidate due to presence of peripheral vascular disease and possible access site issues.  This will be w/u with a CT scan as part of TAVR w/u. He also has other valvular issues which I think patient needs further investigation.  These include mitral stenosis/mitral regurgitation, tricuspid regurgitation.  I will perform trans-esophageal  echocardiogram to further evaluate his valvular disease.      I explained the procedure (EMILIANO) to the patient.  I explained the risks and benefits and answered all his questions to the best of my ability.  The patient verbally consented to undergo the procedure.    2.  Paroxysmal atrial fibrillation with high chads vas score  The patient is currently on Eliquis for anticoagulation for primary prevention.  I will continue him on AC and also on metoprolol 25 mg p.o. twice daily.    3.  Moderate, nonobstructive two-vessel coronary artery disease  He had disease of the mid RCA and LAD both of which were negative by IFR.  He is currently on Lipitor and metoprolol and I will continue both of these.    4.  Hypertension  Well controlled.    5.  Hyperlipidemia  Blood work from today showed LDL of 51 mg/dL.  ALT is normal    6.  Moderate tricuspid regurgitation, mild mitral regurgitation and mitral stenosis  EMILIANO for further w/u.     Thank you for allowing me to participate in the care of Efrem Xiong MD  Cardiology    Orders Placed This Encounter   Procedures     Cardiac Structural Heart Clinic     Follow-Up with Cardiologist     Transesophageal Echocardiogram       No orders of the defined types were placed in this encounter.      There are no discontinued medications.    Encounter Diagnosis   Name Primary?     Aortic valve stenosis, etiology of cardiac valve disease unspecified        CURRENT MEDICATIONS:  Current Outpatient Medications   Medication Sig Dispense Refill     acetaminophen (TYLENOL) 325 MG tablet Take 325-650 mg by mouth every 6 hours as needed for mild pain       apixaban ANTICOAGULANT (ELIQUIS) 5 MG tablet Take 1 tablet (5 mg) by mouth 2 times daily 180 tablet 3     ASPIRIN PO Take 81 mg by mouth every evening        atorvastatin (LIPITOR) 40 MG tablet Take 1 tablet (40 mg) by mouth daily 90 tablet 2     calcium carbonate 600 mg-vitamin D 400 units (CALTRATE) 600-400 MG-UNIT per tablet  Take 1 tablet by mouth 2 times daily       Cyanocobalamin 2000 MCG TABS Take 2,000 mcg by mouth every 7 days On Sundays       gabapentin (NEURONTIN) 300 MG capsule Take 2 capsules (600 mg) by mouth every evening 60 capsule 5     loperamide (IMODIUM) 2 MG capsule Take 2 mg by mouth 4 times daily as needed for diarrhea       melatonin 1 MG TABS tablet Take 1-3 mg by mouth nightly as needed for sleep       metoprolol succinate ER (TOPROL-XL) 25 MG 24 hr tablet Take 0.5 tablets (12.5 mg) by mouth daily 45 tablet 3     multivitamin (OCUVITE) TABS Take 1 tablet by mouth 2 times daily        polyethylene glycol (MIRALAX/GLYCOLAX) powder Take 1 capful by mouth daily as needed for constipation       senna-docusate (SENOKOT-S;PERICOLACE) 8.6-50 MG per tablet Take 2 tablets by mouth 2 times daily 120 tablet 3       ALLERGIES     Allergies   Allergen Reactions     Lasix [Furosemide] Other (See Comments) and Swelling     Throat swelling, wheezing     Sulfa Drugs Difficulty breathing     Adhesive Tape Blisters     Amiodarone Other (See Comments)     Developed pleural effusion     Penicillins Unknown     Reaction occurred as a child       PAST MEDICAL HISTORY:  Past Medical History:   Diagnosis Date     Atrial fibrillation (H)      Benign essential hypertension 11/20/2018     Cancer (H) vocal cord     Carpal tunnel syndrome     abstracted 7/3/02.     CVA (cerebral infarction) 5/5/2015     Demyelinating disease of central nervous system, unspecified (H)     abstracted 7/3/02.     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     Pneumonia 8/17/2016     Redundant colon     needs CT colonography     S/P coronary angiogram 09/05/2017     Shingles      SKIN DISORDERS NEC 3/15/2005     Sleep apnea        PAST SURGICAL HISTORY:  Past Surgical History:   Procedure  Laterality Date     BIOPSY  brain 2002     BONE MARROW BIOPSY, BONE SPECIMEN, NEEDLE/TROCAR N/A 2017    Procedure: BIOPSY BONE MARROW;  UNILATERAL BONE MARROW BIOPSY (CONSCIOUS SEDATION) ;  Surgeon: Jamie Gonzales MD;  Location:  GI     C NONSPECIFIC PROCEDURE  as a child    T & A. abstracted 7/3/02.     C NONSPECIFIC PROCEDURE  early    CTR. abstracted 7/3/02.     ESOPHAGOSCOPY, GASTROSCOPY, DUODENOSCOPY (EGD), COMBINED N/A 2018    Procedure: COMBINED ESOPHAGOSCOPY, GASTROSCOPY, DUODENOSCOPY (EGD), BIOPSY SINGLE OR MULTIPLE;;  Surgeon: Xander Marie MD;  Location:  GI     HEAD & NECK SURGERY       ORTHOPEDIC SURGERY      right arm ulna reset after injury     THORACOSCOPIC WEDGE RESECTION LUNG Right 2016    Procedure: THORACOSCOPIC WEDGE RESECTION LUNG;  Surgeon: Abdelrahman Noriega MD;  Location:  OR       FAMILY HISTORY:  Family History   Problem Relation Age of Onset     Blood Disease Mother         Anemia     Cardiovascular Father      Cancer - colorectal Maternal Grandfather      Hypertension Brother      Diabetes Sister 5        Juvinile Diabetes passed at 36     Respiratory Other         Lung Cancer       SOCIAL HISTORY:  Social History     Socioeconomic History     Marital status:      Spouse name: None     Number of children: None     Years of education: None     Highest education level: None   Occupational History     None   Social Needs     Financial resource strain: None     Food insecurity:     Worry: None     Inability: None     Transportation needs:     Medical: None     Non-medical: None   Tobacco Use     Smoking status: Former Smoker     Packs/day: 1.00     Years: 30.00     Pack years: 30.00     Types: Cigarettes     Last attempt to quit: 2013     Years since quittin.0     Smokeless tobacco: Never Used   Substance and Sexual Activity     Alcohol use: Yes     Alcohol/week: 25.2 oz     Types: 42 Standard drinks or equivalent per week     Comment:  "occ     Drug use: No     Sexual activity: Not Currently   Lifestyle     Physical activity:     Days per week: None     Minutes per session: None     Stress: None   Relationships     Social connections:     Talks on phone: None     Gets together: None     Attends Synagogue service: None     Active member of club or organization: None     Attends meetings of clubs or organizations: None     Relationship status: None     Intimate partner violence:     Fear of current or ex partner: None     Emotionally abused: None     Physically abused: None     Forced sexual activity: None   Other Topics Concern     Parent/sibling w/ CABG, MI or angioplasty before 65F 55M? Not Asked      Service Not Asked     Blood Transfusions Not Asked     Caffeine Concern Yes     Comment: 1 Coke occasionally      Occupational Exposure Not Asked     Hobby Hazards Not Asked     Sleep Concern Not Asked     Stress Concern Not Asked     Weight Concern Not Asked     Special Diet No     Back Care Not Asked     Exercise No     Bike Helmet Not Asked     Seat Belt Not Asked     Self-Exams Not Asked   Social History Narrative     None       Review of Systems:  Skin:  Negative       Eyes:  Positive for glasses    ENT:  Negative      Respiratory:  Positive for dyspnea on exertion     Cardiovascular:    Positive for;dizziness;fatigue    Gastroenterology: not assessed      Genitourinary:  Negative      Musculoskeletal:  Positive for joint pain;arthritis;muscular weakness    Neurologic:  Positive for stroke;incoordination    Psychiatric:  Positive for sleep disturbances    Heme/Lymph/Imm:  Positive for   Vocal cord CA in 2013.  Endocrine:  Negative        Physical Exam:  Vitals: /63   Pulse 54   Ht 1.727 m (5' 8\")   Wt 70.3 kg (155 lb)   BMI 23.57 kg/m      Constitutional: awake, alert, no distress  Skin: Warm and dry to touch  Head: Normocephalic, atraumatic  Eyes: Conjunctivae and lids unremarkable, sclera white  ENT: No pallor or " cyanosis  Respiratory: Normal breath sounds, clear to auscultation  Cardiac: Regular rate and rhythm, S1 normal, soft S2, delayed carotid upstrokes, 2/6 late peaking systolic murmur  heard best at the right upper sternal border.  No pedal edema.   Extremities and musculoskeletal: No gross motor deficit, Poor pulses on the right lower extremity.  Neurological.  Affect normal  Psych: Alert and oriented x3    Recent Lab Results:  LIPID RESULTS:  Lab Results   Component Value Date    CHOL 118 06/24/2019    HDL 51 06/24/2019    LDL 51 06/24/2019    TRIG 80 06/24/2019    CHOLHDLRATIO 2.7 09/04/2015       LIVER ENZYME RESULTS:  Lab Results   Component Value Date    AST 18 07/02/2019    ALT 16 07/02/2019       CBC RESULTS:  Lab Results   Component Value Date    WBC 12.5 (H) 07/02/2019    RBC 3.78 (L) 07/02/2019    HGB 12.4 (L) 07/02/2019    HCT 35.9 (L) 07/02/2019    MCV 95 07/02/2019    MCH 32.8 07/02/2019    MCHC 34.5 07/02/2019    RDW 14.0 07/02/2019     07/02/2019       BMP RESULTS:  Lab Results   Component Value Date     07/02/2019    POTASSIUM 3.6 07/10/2019    CHLORIDE 107 07/02/2019    CO2 30 07/02/2019    ANIONGAP 4 07/02/2019    GLC 82 07/02/2019    BUN 17 07/02/2019    CR 0.83 07/02/2019    GFRESTIMATED 85 07/02/2019    GFRESTBLACK >90 07/02/2019    ULISSES 8.9 07/02/2019        A1C RESULTS:  Lab Results   Component Value Date    A1C 5.3 09/04/2015       INR RESULTS:  Lab Results   Component Value Date    INR 1.08 10/22/2018    INR 1.02 09/05/2017       CC  Marya Vela, APRN CNP  [address] [place] [address]    All medical records were reviewed in detail and discussed with the patient. Greater than 35 mins were spent with the patient, 50% of this time was spent on counseling and coordination of care.  After visit summary was printed and given to the patient.      Thank you for allowing me to participate in the care of your patient.    Sincerely,     Severo Xiong MD     [facility]  [facility]

## 2019-07-29 ENCOUNTER — TELEPHONE (OUTPATIENT)
Dept: CARDIOLOGY | Facility: CLINIC | Age: 74
End: 2019-07-29

## 2019-07-29 NOTE — TELEPHONE ENCOUNTER
Spoke with Dr. Xiong about required risks/benefits discussion prior to 7/31/19 EMILIANO. Dr. Xiong verbalized that he would addend his 7/26/19 office note to include this.     Called patient x2, once at approx 1130 and once at approx 1630, to discuss pre-visit. Unable to reach, left VM both times requesting callback.     ELOISA FischerN, RN  Care Coordinator - Cardiology    Addendum: Received return call from patient's wife. Reviewed Cardioversion Check List:      Therapeutic INR >2.0 x 4 weeks: N/A - on Eliquis  Insulin/metformin/glipazide: No  Digoxin/Lanoxin: No  Patient aware to be NPO x 6hr except for medications.  Transportation home: Wife  H&P + consent + risk&benefit within 30 days: Per Dr. Xiong, will addend OV note to include this  Labwork ordered DAY OF procedure only: N/A    Patient aware of time, date, and location.

## 2019-07-31 ENCOUNTER — HOSPITAL ENCOUNTER (OUTPATIENT)
Facility: CLINIC | Age: 74
Discharge: HOME | End: 2019-07-31
Attending: INTERNAL MEDICINE | Admitting: INTERNAL MEDICINE
Payer: MEDICARE

## 2019-07-31 ENCOUNTER — HOSPITAL ENCOUNTER (OUTPATIENT)
Dept: CARDIOLOGY | Facility: CLINIC | Age: 74
End: 2019-07-31
Attending: INTERNAL MEDICINE | Admitting: INTERNAL MEDICINE
Payer: MEDICARE

## 2019-07-31 VITALS
HEIGHT: 68 IN | RESPIRATION RATE: 16 BRPM | DIASTOLIC BLOOD PRESSURE: 58 MMHG | BODY MASS INDEX: 23.79 KG/M2 | HEART RATE: 68 BPM | WEIGHT: 157 LBS | SYSTOLIC BLOOD PRESSURE: 97 MMHG | TEMPERATURE: 98 F | OXYGEN SATURATION: 97 %

## 2019-07-31 DIAGNOSIS — D47.2 MGUS (MONOCLONAL GAMMOPATHY OF UNKNOWN SIGNIFICANCE): ICD-10-CM

## 2019-07-31 DIAGNOSIS — I35.0 AORTIC VALVE STENOSIS, ETIOLOGY OF CARDIAC VALVE DISEASE UNSPECIFIED: ICD-10-CM

## 2019-07-31 PROCEDURE — 40000857 ZZH STATISTIC TEE INCLUDES SEDATION

## 2019-07-31 PROCEDURE — 76376 3D RENDER W/INTRP POSTPROCES: CPT | Mod: 26 | Performed by: INTERNAL MEDICINE

## 2019-07-31 PROCEDURE — 93320 DOPPLER ECHO COMPLETE: CPT | Mod: 26 | Performed by: INTERNAL MEDICINE

## 2019-07-31 PROCEDURE — 93320 DOPPLER ECHO COMPLETE: CPT

## 2019-07-31 PROCEDURE — 25000125 ZZHC RX 250: Performed by: INTERNAL MEDICINE

## 2019-07-31 PROCEDURE — 93325 DOPPLER ECHO COLOR FLOW MAPG: CPT | Mod: 26 | Performed by: INTERNAL MEDICINE

## 2019-07-31 PROCEDURE — 93312 ECHO TRANSESOPHAGEAL: CPT | Mod: 26 | Performed by: INTERNAL MEDICINE

## 2019-07-31 PROCEDURE — 25800025 ZZH RX 258: Performed by: INTERNAL MEDICINE

## 2019-07-31 PROCEDURE — 25000128 H RX IP 250 OP 636: Performed by: INTERNAL MEDICINE

## 2019-07-31 RX ORDER — ATORVASTATIN CALCIUM 40 MG/1
40 TABLET, FILM COATED ORAL DAILY
Qty: 90 TABLET | Refills: 3 | Status: SHIPPED | OUTPATIENT
Start: 2019-07-31 | End: 2020-07-09

## 2019-07-31 RX ORDER — GLYCOPYRROLATE 0.2 MG/ML
0.1 INJECTION, SOLUTION INTRAMUSCULAR; INTRAVENOUS ONCE
Status: COMPLETED | OUTPATIENT
Start: 2019-07-31 | End: 2019-07-31

## 2019-07-31 RX ORDER — FLUMAZENIL 0.1 MG/ML
0.2 INJECTION, SOLUTION INTRAVENOUS
Status: DISCONTINUED | OUTPATIENT
Start: 2019-07-31 | End: 2019-08-01 | Stop reason: HOSPADM

## 2019-07-31 RX ORDER — FENTANYL CITRATE 50 UG/ML
25 INJECTION, SOLUTION INTRAMUSCULAR; INTRAVENOUS
Status: DISCONTINUED | OUTPATIENT
Start: 2019-07-31 | End: 2019-07-31 | Stop reason: HOSPADM

## 2019-07-31 RX ORDER — SODIUM CHLORIDE 9 MG/ML
INJECTION, SOLUTION INTRAVENOUS CONTINUOUS PRN
Status: DISCONTINUED | OUTPATIENT
Start: 2019-07-31 | End: 2019-08-01 | Stop reason: HOSPADM

## 2019-07-31 RX ORDER — SODIUM CHLORIDE 9 MG/ML
INJECTION, SOLUTION INTRAVENOUS CONTINUOUS PRN
Status: DISCONTINUED | OUTPATIENT
Start: 2019-07-31 | End: 2019-07-31 | Stop reason: HOSPADM

## 2019-07-31 RX ORDER — NALOXONE HYDROCHLORIDE 0.4 MG/ML
.1-.4 INJECTION, SOLUTION INTRAMUSCULAR; INTRAVENOUS; SUBCUTANEOUS
Status: DISCONTINUED | OUTPATIENT
Start: 2019-07-31 | End: 2019-07-31 | Stop reason: HOSPADM

## 2019-07-31 RX ORDER — PHENYLEPHRINE HCL IN 0.9% NACL 1 MG/10 ML
50 SYRINGE (ML) INTRAVENOUS CONTINUOUS PRN
Status: DISCONTINUED | OUTPATIENT
Start: 2019-07-31 | End: 2019-07-31

## 2019-07-31 RX ORDER — FENTANYL CITRATE 50 UG/ML
25 INJECTION, SOLUTION INTRAMUSCULAR; INTRAVENOUS ONCE
Status: COMPLETED | OUTPATIENT
Start: 2019-07-31 | End: 2019-07-31

## 2019-07-31 RX ORDER — FENTANYL CITRATE 50 UG/ML
25 INJECTION, SOLUTION INTRAMUSCULAR; INTRAVENOUS
Status: DISCONTINUED | OUTPATIENT
Start: 2019-07-31 | End: 2019-08-01 | Stop reason: HOSPADM

## 2019-07-31 RX ORDER — FLUMAZENIL 0.1 MG/ML
0.2 INJECTION, SOLUTION INTRAVENOUS
Status: DISCONTINUED | OUTPATIENT
Start: 2019-07-31 | End: 2019-07-31 | Stop reason: HOSPADM

## 2019-07-31 RX ORDER — FENTANYL CITRATE 50 UG/ML
25 INJECTION, SOLUTION INTRAMUSCULAR; INTRAVENOUS ONCE
Status: DISCONTINUED | OUTPATIENT
Start: 2019-07-31 | End: 2019-08-01 | Stop reason: HOSPADM

## 2019-07-31 RX ORDER — NALOXONE HYDROCHLORIDE 0.4 MG/ML
.1-.4 INJECTION, SOLUTION INTRAMUSCULAR; INTRAVENOUS; SUBCUTANEOUS
Status: DISCONTINUED | OUTPATIENT
Start: 2019-07-31 | End: 2019-08-01 | Stop reason: HOSPADM

## 2019-07-31 RX ORDER — PHENYLEPHRINE HCL IN 0.9% NACL 1 MG/10 ML
50 SYRINGE (ML) INTRAVENOUS ONCE
Status: COMPLETED | OUTPATIENT
Start: 2019-07-31 | End: 2019-07-31

## 2019-07-31 RX ORDER — DEXTROSE MONOHYDRATE 25 G/50ML
9.5 INJECTION, SOLUTION INTRAVENOUS
Status: DISCONTINUED | OUTPATIENT
Start: 2019-07-31 | End: 2019-07-31 | Stop reason: HOSPADM

## 2019-07-31 RX ORDER — LIDOCAINE 40 MG/G
CREAM TOPICAL
Status: DISCONTINUED | OUTPATIENT
Start: 2019-07-31 | End: 2019-08-01 | Stop reason: HOSPADM

## 2019-07-31 RX ORDER — GLYCOPYRROLATE 0.2 MG/ML
0.1 INJECTION, SOLUTION INTRAMUSCULAR; INTRAVENOUS ONCE
Status: DISCONTINUED | OUTPATIENT
Start: 2019-07-31 | End: 2019-08-01 | Stop reason: HOSPADM

## 2019-07-31 RX ORDER — PHENYLEPHRINE HCL IN 0.9% NACL 1 MG/10 ML
100 SYRINGE (ML) INTRAVENOUS ONCE
Status: COMPLETED | OUTPATIENT
Start: 2019-07-31 | End: 2019-07-31

## 2019-07-31 RX ADMIN — FENTANYL CITRATE 50 MCG: 50 INJECTION, SOLUTION INTRAMUSCULAR; INTRAVENOUS at 13:48

## 2019-07-31 RX ADMIN — Medication 50 MCG: at 14:10

## 2019-07-31 RX ADMIN — Medication 50 MCG: at 14:02

## 2019-07-31 RX ADMIN — DEXTROSE MONOHYDRATE 10 ML: 25 INJECTION, SOLUTION INTRAVENOUS at 14:15

## 2019-07-31 RX ADMIN — GLYCOPYRROLATE 0.1 MG: 0.2 INJECTION, SOLUTION INTRAMUSCULAR; INTRAVENOUS at 13:21

## 2019-07-31 RX ADMIN — TOPICAL ANESTHETIC 0.5 ML: 200 SPRAY DENTAL; PERIODONTAL at 13:28

## 2019-07-31 RX ADMIN — MIDAZOLAM HYDROCHLORIDE 1 MG: 1 INJECTION, SOLUTION INTRAMUSCULAR; INTRAVENOUS at 13:50

## 2019-07-31 RX ADMIN — MIDAZOLAM HYDROCHLORIDE 1 MG: 1 INJECTION, SOLUTION INTRAMUSCULAR; INTRAVENOUS at 13:48

## 2019-07-31 ASSESSMENT — MIFFLIN-ST. JEOR: SCORE: 1416.65

## 2019-07-31 NOTE — PROGRESS NOTES
EMILIANO done in [facility] [facility]  BP baseline  - soft - remained low through out procedure   Total of 2 mg versed and 50 mcg of IV Fentanyl given  In addition phenylephrine 50 mcg IV given x 2 for low BP's  Pt tolerated procedure well   Presently sitting on side of bed - talking - taking fluids  Good SATs on RA  AVS given   IV dc'd   Pt ready for discharge home with wife

## 2019-07-31 NOTE — DISCHARGE INSTRUCTIONS
EMILIANO  (Transesophageal Echocardiogram)  Discharge Instructions    After you go home:      Have an adult stay with you for 6 hours.       For 24 hours - due to the sedation you received:    Relax and take it easy.    Do NOT make any important or legal decisions.    Do NOT drive or operate machines at home or at work.    Do NOT drink alcohol.    Diet:    You may resume your normal diet, but no scratchy foods for two days.    If your throat is sore, eat cold, bland or soft foods.    You may have heartburn if the tube used in the exam entered your stomach.  If so:   - Do not eat acidic and spicy foods.   - Do not eat three hours before bedtime. Clear liquids are okay.   - When lying down, use two pillows to raise your head.    Medicines:      Take your medications, including blood thinners, unless your provider tells you not to.    If you have stopped any medicines, check with your provider about when to restart them.    You may take Tylenol (Acetaminophen) if your throat is sore.    You may take antacids if you have heartburn.      Follow Up Appointments:      Follow up with your cardiologist at [facility] of patient preference as instructed.    Follow up with your primary care provider as needed.    Call the clinic if:      You have heartburn that is severe or lasts more than 72 hours.    You have a sore throat that feels worse after 72 hours.    You have shortness of breath, neck pain, chest pain, fever, chills, coughing up blood, or other unusual signs.    Questions or concerns      [facility] [place]:    [phone] [facility] (7 days a week)

## 2019-08-06 ENCOUNTER — OFFICE VISIT (OUTPATIENT)
Dept: CARDIOLOGY | Facility: CLINIC | Age: 74
End: 2019-08-06
Attending: INTERNAL MEDICINE
Payer: MEDICARE

## 2019-08-06 VITALS
HEART RATE: 54 BPM | WEIGHT: 157 LBS | BODY MASS INDEX: 23.79 KG/M2 | DIASTOLIC BLOOD PRESSURE: 60 MMHG | HEIGHT: 68 IN | SYSTOLIC BLOOD PRESSURE: 96 MMHG

## 2019-08-06 DIAGNOSIS — I48.91 ATRIAL FIBRILLATION AND FLUTTER (H): ICD-10-CM

## 2019-08-06 DIAGNOSIS — I25.810 CORONARY ARTERY DISEASE INVOLVING CORONARY BYPASS GRAFT OF NATIVE HEART WITHOUT ANGINA PECTORIS: ICD-10-CM

## 2019-08-06 DIAGNOSIS — D47.2 MGUS (MONOCLONAL GAMMOPATHY OF UNKNOWN SIGNIFICANCE): ICD-10-CM

## 2019-08-06 DIAGNOSIS — I35.0 AORTIC VALVE STENOSIS, ETIOLOGY OF CARDIAC VALVE DISEASE UNSPECIFIED: Primary | ICD-10-CM

## 2019-08-06 DIAGNOSIS — I10 BENIGN ESSENTIAL HYPERTENSION: ICD-10-CM

## 2019-08-06 DIAGNOSIS — I48.92 ATRIAL FIBRILLATION AND FLUTTER (H): ICD-10-CM

## 2019-08-06 PROCEDURE — 99205 OFFICE O/P NEW HI 60 MIN: CPT | Performed by: INTERNAL MEDICINE

## 2019-08-06 PROCEDURE — 93000 ELECTROCARDIOGRAM COMPLETE: CPT | Performed by: INTERNAL MEDICINE

## 2019-08-06 ASSESSMENT — MIFFLIN-ST. JEOR: SCORE: 1416.65

## 2019-08-06 NOTE — LETTER
8/6/2019    Danny Paige MD, MD  [address]  [facility] [address]    RE: Efrem Ramos       Dear Colleague,    I had the pleasure of seeing Efrem Ramos in the [facility].    HPI and Plan:   See dictation    Orders Placed This Encounter   Procedures     EKG 12-lead complete w/read - Clinics (performed today)     No orders of the defined types were placed in this encounter.    There are no discontinued medications.      Encounter Diagnosis   Name Primary?     Aortic valve stenosis, etiology of cardiac valve disease unspecified Yes       CURRENT MEDICATIONS:  Current Outpatient Medications   Medication Sig Dispense Refill     acetaminophen (TYLENOL) 325 MG tablet Take 325-650 mg by mouth every 6 hours as needed for mild pain       apixaban ANTICOAGULANT (ELIQUIS) 5 MG tablet Take 1 tablet (5 mg) by mouth 2 times daily 180 tablet 3     ASPIRIN PO Take 81 mg by mouth every evening        atorvastatin (LIPITOR) 40 MG tablet Take 1 tablet (40 mg) by mouth daily 90 tablet 3     calcium carbonate 600 mg-vitamin D 400 units (CALTRATE) 600-400 MG-UNIT per tablet Take 1 tablet by mouth 2 times daily       Cyanocobalamin 2000 MCG TABS Take 2,000 mcg by mouth every 7 days On Sundays       gabapentin (NEURONTIN) 300 MG capsule Take 2 capsules (600 mg) by mouth every evening 60 capsule 5     loperamide (IMODIUM) 2 MG capsule Take 2 mg by mouth 4 times daily as needed for diarrhea       melatonin 1 MG TABS tablet Take 1-3 mg by mouth nightly as needed for sleep       metoprolol succinate ER (TOPROL-XL) 25 MG 24 hr tablet Take 0.5 tablets (12.5 mg) by mouth daily 45 tablet 3     multivitamin (OCUVITE) TABS Take 1 tablet by mouth 2 times daily        polyethylene glycol (MIRALAX/GLYCOLAX) powder Take 1 capful by mouth daily as needed for constipation       senna-docusate (SENOKOT-S;PERICOLACE) 8.6-50 MG per tablet Take 2 tablets by mouth 2 times daily 120 tablet 3        ALLERGIES     Allergies   Allergen Reactions     Lasix [Furosemide] Other (See Comments) and Swelling     Throat swelling, wheezing     Sulfa Drugs Difficulty breathing     Adhesive Tape Blisters     Amiodarone Other (See Comments)     Developed pleural effusion     Penicillins Unknown     Reaction occurred as a child       PAST MEDICAL HISTORY:  Past Medical History:   Diagnosis Date     Atrial fibrillation (H)      Benign essential hypertension 11/20/2018     Cancer (H) vocal cord     Carpal tunnel syndrome     abstracted 7/3/02.     CVA (cerebral infarction) 5/5/2015     Demyelinating disease of central nervous system, unspecified (H)     abstracted 7/3/02.     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     Pneumonia 8/17/2016     PVD (peripheral vascular disease) (H)      Redundant colon     needs CT colonography     S/P coronary angiogram 09/05/2017     Shingles      SKIN DISORDERS NEC 3/15/2005     Sleep apnea        PAST SURGICAL HISTORY:  Past Surgical History:   Procedure Laterality Date     BIOPSY  brain 2002     BONE MARROW BIOPSY, BONE SPECIMEN, NEEDLE/TROCAR N/A 6/8/2017    Procedure: BIOPSY BONE MARROW;  UNILATERAL BONE MARROW BIOPSY (CONSCIOUS SEDATION) ;  Surgeon: Jamie Gonzales MD;  Location:  GI     C NONSPECIFIC PROCEDURE  as a child    T & A. abstracted 7/3/02.     C NONSPECIFIC PROCEDURE  early    CTR. abstracted 7/3/02.     CARDIAC CATHERIZATION  09/05/2017    2V CAD, IFR of RCA 0.95     ESOPHAGOSCOPY, GASTROSCOPY, DUODENOSCOPY (EGD), COMBINED N/A  2018    Procedure: COMBINED ESOPHAGOSCOPY, GASTROSCOPY, DUODENOSCOPY (EGD), BIOPSY SINGLE OR MULTIPLE;;  Surgeon: Xander Marie MD;  Location:  GI     HEAD & NECK SURGERY       ORTHOPEDIC SURGERY      right arm ulna reset after injury     THORACOSCOPIC WEDGE RESECTION LUNG Right 2016    Procedure: THORACOSCOPIC WEDGE RESECTION LUNG;  Surgeon: Abdelrahman Noriega MD;  Location:  OR       FAMILY HISTORY:  Family History   Problem Relation Age of Onset     Blood Disease Mother         Anemia     Cardiovascular Father      Cancer - colorectal Maternal Grandfather      Hypertension Brother      Diabetes Sister 5        Juvinile Diabetes passed at 36     Respiratory Other         Lung Cancer       SOCIAL HISTORY:  Social History     Socioeconomic History     Marital status:      Spouse name: None     Number of children: None     Years of education: None     Highest education level: None   Occupational History     None   Social Needs     Financial resource strain: None     Food insecurity:     Worry: None     Inability: None     Transportation needs:     Medical: None     Non-medical: None   Tobacco Use     Smoking status: Former Smoker     Packs/day: 1.00     Years: 30.00     Pack years: 30.00     Types: Cigarettes     Last attempt to quit: 2013     Years since quittin.0     Smokeless tobacco: Never Used   Substance and Sexual Activity     Alcohol use: Yes     Alcohol/week: 25.2 oz     Types: 42 Standard drinks or equivalent per week     Comment: occ     Drug use: No     Sexual activity: Not Currently   Lifestyle     Physical activity:     Days per week: None     Minutes per session: None     Stress: None   Relationships     Social connections:     Talks on phone: None     Gets together: None     Attends Spiritism service: None     Active member of club or organization: None     Attends meetings of clubs or organizations: None     Relationship status: None     Intimate partner  "violence:     Fear of current or ex partner: None     Emotionally abused: None     Physically abused: None     Forced sexual activity: None   Other Topics Concern     Parent/sibling w/ CABG, MI or angioplasty before 65F 55M? Not Asked      Service Not Asked     Blood Transfusions Not Asked     Caffeine Concern Yes     Comment: 1 Coke occasionally      Occupational Exposure Not Asked     Hobby Hazards Not Asked     Sleep Concern Not Asked     Stress Concern Not Asked     Weight Concern Not Asked     Special Diet No     Back Care Not Asked     Exercise No     Bike Helmet Not Asked     Seat Belt Not Asked     Self-Exams Not Asked   Social History Narrative     None       Review of Systems:  Skin:  Negative     Eyes:  Positive for glasses  ENT:  Negative    Respiratory:  Positive for dyspnea on exertion  Cardiovascular:    Positive for;dizziness;fatigue  Gastroenterology: not assessed    Genitourinary:  Negative    Musculoskeletal:  Positive for joint pain;arthritis;muscular weakness  Neurologic:  Positive for stroke;incoordination  Psychiatric:  Positive for sleep disturbances  Heme/Lymph/Imm:  Positive for    Endocrine:  Negative      Physical Exam:  Vitals: BP 96/60   Pulse 54   Ht 1.727 m (5' 8\")   Wt 71.2 kg (157 lb)   BMI 23.87 kg/m       Constitutional:  cooperative, alert and oriented, well developed, well nourished, in no acute distress frail      Skin:  warm and dry to the touch, no apparent skin lesions or masses noted          Head:  normocephalic, no masses or lesions        Eyes:  pupils equal and round, conjunctivae and lids unremarkable, sclera white, no xanthalasma, EOMS intact, no nystagmus        Lymph:No Cervical lymphadenopathy present     ENT:  no pallor or cyanosis, dentition good        Neck:  carotid pulses are full and equal bilaterally, JVP normal, no carotid bruit        Respiratory:  clear to auscultation;normal symmetry;no tenderness to palpation;normal respiratory excursion;no " intercostal retraction;no use of accessory muscles fine rales  bilateral upper lobes    Cardiac: regular rhythm;no S3 or S4 irregular rhythm     systolic murmur;late systolic murmur;grade 3 systolic murmur;RUSB   S1, S2 regular with 3/6 ESM LUSB    pulses below the femoral arteries are diminished;radial-femoral delay                             right radial bruit (-)   R radial artery without hematoma, bruising or tenderness    GI:  BS normoactive;non-tender        Extremities and Muscular Skeletal:  no deformities, clubbing, cyanosis, erythema observed;no edema   bilateral LE edema;trace RLE edema;trace;pitting LLE edema;pitting;1+ up to shin    Neurological:  no gross motor deficits;affect appropriate ataxia;limb weakness      Psych:  Alert and Oriented x 3        Recent Lab Results:  LIPID RESULTS:  Lab Results   Component Value Date    CHOL 118 06/24/2019    HDL 51 06/24/2019    LDL 51 06/24/2019    TRIG 80 06/24/2019    CHOLHDLRATIO 2.7 09/04/2015       LIVER ENZYME RESULTS:  Lab Results   Component Value Date    AST 18 07/02/2019    ALT 16 07/02/2019       CBC RESULTS:  Lab Results   Component Value Date    WBC 12.5 (H) 07/02/2019    RBC 3.78 (L) 07/02/2019    HGB 12.4 (L) 07/02/2019    HCT 35.9 (L) 07/02/2019    MCV 95 07/02/2019    MCH 32.8 07/02/2019    MCHC 34.5 07/02/2019    RDW 14.0 07/02/2019     07/02/2019       BMP RESULTS:  Lab Results   Component Value Date     07/02/2019    POTASSIUM 3.6 07/10/2019    CHLORIDE 107 07/02/2019    CO2 30 07/02/2019    ANIONGAP 4 07/02/2019    GLC 82 07/02/2019    BUN 17 07/02/2019    CR 0.83 07/02/2019    GFRESTIMATED 85 07/02/2019    GFRESTBLACK >90 07/02/2019    ULISSES 8.9 07/02/2019        A1C RESULTS:  Lab Results   Component Value Date    A1C 5.3 09/04/2015       INR RESULTS:  Lab Results   Component Value Date    INR 1.08 10/22/2018    INR 1.02 09/05/2017           CC  Severo Xiong MD  [address]  [place]  [id number]                  Thank you for allowing me to participate in the care of your patient.      Sincerely,     MILADYS ASH MD     [facility]    cc:   Severo Xiong MD  [address]  [place] [address]

## 2019-08-06 NOTE — PROGRESS NOTES
HPI and Plan:   See dictation    Orders Placed This Encounter   Procedures     EKG 12-lead complete w/read - Clinics (performed today)     No orders of the defined types were placed in this encounter.    There are no discontinued medications.      Encounter Diagnosis   Name Primary?     Aortic valve stenosis, etiology of cardiac valve disease unspecified Yes       CURRENT MEDICATIONS:  Current Outpatient Medications   Medication Sig Dispense Refill     acetaminophen (TYLENOL) 325 MG tablet Take 325-650 mg by mouth every 6 hours as needed for mild pain       apixaban ANTICOAGULANT (ELIQUIS) 5 MG tablet Take 1 tablet (5 mg) by mouth 2 times daily 180 tablet 3     ASPIRIN PO Take 81 mg by mouth every evening        atorvastatin (LIPITOR) 40 MG tablet Take 1 tablet (40 mg) by mouth daily 90 tablet 3     calcium carbonate 600 mg-vitamin D 400 units (CALTRATE) 600-400 MG-UNIT per tablet Take 1 tablet by mouth 2 times daily       Cyanocobalamin 2000 MCG TABS Take 2,000 mcg by mouth every 7 days On Sundays       gabapentin (NEURONTIN) 300 MG capsule Take 2 capsules (600 mg) by mouth every evening 60 capsule 5     loperamide (IMODIUM) 2 MG capsule Take 2 mg by mouth 4 times daily as needed for diarrhea       melatonin 1 MG TABS tablet Take 1-3 mg by mouth nightly as needed for sleep       metoprolol succinate ER (TOPROL-XL) 25 MG 24 hr tablet Take 0.5 tablets (12.5 mg) by mouth daily 45 tablet 3     multivitamin (OCUVITE) TABS Take 1 tablet by mouth 2 times daily        polyethylene glycol (MIRALAX/GLYCOLAX) powder Take 1 capful by mouth daily as needed for constipation       senna-docusate (SENOKOT-S;PERICOLACE) 8.6-50 MG per tablet Take 2 tablets by mouth 2 times daily 120 tablet 3       ALLERGIES     Allergies   Allergen Reactions     Lasix [Furosemide] Other (See Comments) and Swelling     Throat swelling, wheezing     Sulfa Drugs Difficulty breathing     Adhesive Tape Blisters     Amiodarone Other (See Comments)      Developed pleural effusion     Penicillins Unknown     Reaction occurred as a child       PAST MEDICAL HISTORY:  Past Medical History:   Diagnosis Date     Atrial fibrillation (H)      Benign essential hypertension 11/20/2018     Cancer (H) vocal cord     Carpal tunnel syndrome     abstracted 7/3/02.     CVA (cerebral infarction) 5/5/2015     Demyelinating disease of central nervous system, unspecified (H)     abstracted 7/3/02.     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     Pneumonia 8/17/2016     PVD (peripheral vascular disease) (H)      Redundant colon     needs CT colonography     S/P coronary angiogram 09/05/2017     Shingles      SKIN DISORDERS NEC 3/15/2005     Sleep apnea        PAST SURGICAL HISTORY:  Past Surgical History:   Procedure Laterality Date     BIOPSY  brain 2002     BONE MARROW BIOPSY, BONE SPECIMEN, NEEDLE/TROCAR N/A 6/8/2017    Procedure: BIOPSY BONE MARROW;  UNILATERAL BONE MARROW BIOPSY (CONSCIOUS SEDATION) ;  Surgeon: Jamie Gonzales MD;  Location:  GI     C NONSPECIFIC PROCEDURE  as a child    T & A. abstracted 7/3/02.     C NONSPECIFIC PROCEDURE  early    CTR. abstracted 7/3/02.     CARDIAC CATHERIZATION  09/05/2017    2V CAD, IFR of RCA 0.95     ESOPHAGOSCOPY, GASTROSCOPY, DUODENOSCOPY (EGD), COMBINED N/A 9/8/2018    Procedure: COMBINED ESOPHAGOSCOPY, GASTROSCOPY, DUODENOSCOPY (EGD), BIOPSY SINGLE OR MULTIPLE;;  Surgeon: Xander Marie MD;  Location:  GI     HEAD & NECK SURGERY       ORTHOPEDIC SURGERY      right arm ulna reset after  injury     THORACOSCOPIC WEDGE RESECTION LUNG Right 2016    Procedure: THORACOSCOPIC WEDGE RESECTION LUNG;  Surgeon: Abdelrahman Noriega MD;  Location: [facility] OR       FAMILY HISTORY:  Family History   Problem Relation Age of Onset     Blood Disease Mother         Anemia     Cardiovascular Father      Cancer - colorectal Maternal Grandfather      Hypertension Brother      Diabetes Sister 5        Juvinile Diabetes passed at 36     Respiratory Other         Lung Cancer       SOCIAL HISTORY:  Social History     Socioeconomic History     Marital status:      Spouse name: None     Number of children: None     Years of education: None     Highest education level: None   Occupational History     None   Social Needs     Financial resource strain: None     Food insecurity:     Worry: None     Inability: None     Transportation needs:     Medical: None     Non-medical: None   Tobacco Use     Smoking status: Former Smoker     Packs/day: 1.00     Years: 30.00     Pack years: 30.00     Types: Cigarettes     Last attempt to quit: 2013     Years since quittin.0     Smokeless tobacco: Never Used   Substance and Sexual Activity     Alcohol use: Yes     Alcohol/week: 25.2 oz     Types: 42 Standard drinks or equivalent per week     Comment: occ     Drug use: No     Sexual activity: Not Currently   Lifestyle     Physical activity:     Days per week: None     Minutes per session: None     Stress: None   Relationships     Social connections:     Talks on phone: None     Gets together: None     Attends Episcopalian service: None     Active member of club or organization: None     Attends meetings of clubs or organizations: None     Relationship status: None     Intimate partner violence:     Fear of current or ex partner: None     Emotionally abused: None     Physically abused: None     Forced sexual activity: None   Other Topics Concern     Parent/sibling w/ CABG, MI or angioplasty before 65F 55M? Not Asked      " Service Not Asked     Blood Transfusions Not Asked     Caffeine Concern Yes     Comment: 1 Coke occasionally      Occupational Exposure Not Asked     Hobby Hazards Not Asked     Sleep Concern Not Asked     Stress Concern Not Asked     Weight Concern Not Asked     Special Diet No     Back Care Not Asked     Exercise No     Bike Helmet Not Asked     Seat Belt Not Asked     Self-Exams Not Asked   Social History Narrative     None       Review of Systems:  Skin:  Negative     Eyes:  Positive for glasses  ENT:  Negative    Respiratory:  Positive for dyspnea on exertion  Cardiovascular:    Positive for;dizziness;fatigue  Gastroenterology: not assessed    Genitourinary:  Negative    Musculoskeletal:  Positive for joint pain;arthritis;muscular weakness  Neurologic:  Positive for stroke;incoordination  Psychiatric:  Positive for sleep disturbances  Heme/Lymph/Imm:  Positive for    Endocrine:  Negative      Physical Exam:  Vitals: BP 96/60   Pulse 54   Ht 1.727 m (5' 8\")   Wt 71.2 kg (157 lb)   BMI 23.87 kg/m      Constitutional:  cooperative, alert and oriented, well developed, well nourished, in no acute distress frail      Skin:  warm and dry to the touch, no apparent skin lesions or masses noted          Head:  normocephalic, no masses or lesions        Eyes:  pupils equal and round, conjunctivae and lids unremarkable, sclera white, no xanthalasma, EOMS intact, no nystagmus        Lymph:No Cervical lymphadenopathy present     ENT:  no pallor or cyanosis, dentition good        Neck:  carotid pulses are full and equal bilaterally, JVP normal, no carotid bruit        Respiratory:  clear to auscultation;normal symmetry;no tenderness to palpation;normal respiratory excursion;no intercostal retraction;no use of accessory muscles fine rales  bilateral upper lobes    Cardiac: regular rhythm;no S3 or S4 irregular rhythm     systolic murmur;late systolic murmur;grade 3 systolic murmur;RUSB   S1, S2 regular with 3/6 ESM " LUSB    pulses below the femoral arteries are diminished;radial-femoral delay                             right radial bruit (-)   R radial artery without hematoma, bruising or tenderness    GI:  BS normoactive;non-tender        Extremities and Muscular Skeletal:  no deformities, clubbing, cyanosis, erythema observed;no edema   bilateral LE edema;trace RLE edema;trace;pitting LLE edema;pitting;1+ up to shin    Neurological:  no gross motor deficits;affect appropriate ataxia;limb weakness      Psych:  Alert and Oriented x 3        Recent Lab Results:  LIPID RESULTS:  Lab Results   Component Value Date    CHOL 118 06/24/2019    HDL 51 06/24/2019    LDL 51 06/24/2019    TRIG 80 06/24/2019    CHOLHDLRATIO 2.7 09/04/2015       LIVER ENZYME RESULTS:  Lab Results   Component Value Date    AST 18 07/02/2019    ALT 16 07/02/2019       CBC RESULTS:  Lab Results   Component Value Date    WBC 12.5 (H) 07/02/2019    RBC 3.78 (L) 07/02/2019    HGB 12.4 (L) 07/02/2019    HCT 35.9 (L) 07/02/2019    MCV 95 07/02/2019    MCH 32.8 07/02/2019    MCHC 34.5 07/02/2019    RDW 14.0 07/02/2019     07/02/2019       BMP RESULTS:  Lab Results   Component Value Date     07/02/2019    POTASSIUM 3.6 07/10/2019    CHLORIDE 107 07/02/2019    CO2 30 07/02/2019    ANIONGAP 4 07/02/2019    GLC 82 07/02/2019    BUN 17 07/02/2019    CR 0.83 07/02/2019    GFRESTIMATED 85 07/02/2019    GFRESTBLACK >90 07/02/2019    ULISSES 8.9 07/02/2019        A1C RESULTS:  Lab Results   Component Value Date    A1C 5.3 09/04/2015       INR RESULTS:  Lab Results   Component Value Date    INR 1.08 10/22/2018    INR 1.02 09/05/2017           CC  Severo Xiong MD  [address]  [place] [id number]

## 2019-08-06 NOTE — PROGRESS NOTES
Service Date: 08/06/2019      HISTORY OF PRESENT ILLNESS:  Mr. Ramos is a 74-year-old gentleman who is referred by Dr. Xiong for consideration of transcatheter aortic valve.  He has a history of atrial fibrillation, prior stroke, interstitial lung disease, obstructive sleep apnea, COPD, peripheral arterial disease, walks and/or lives in a walker and looks older than his stated age, who comes in with his wife and son.  He states his complaint is that of fatigue, but it has been constant for 5 years.  Mr. Ramos denies any chest pain, chest pressure.  He does have chronic dyspnea, but again this has been there for 5 years.  He has chronic interstitial lung disease thought secondary to amiodarone therapy.      His echocardiograms have always shown moderate aortic stenosis with gradients in the teens.  His most recent transthoracic study from June showed a gradient of 17, prior 20; however dobutamine stress echo done on 07/01/2019 on low dose increased gradient of 30.  There is no dictation of what happened to the valve area.  It did induce atrial fibrillation.  He had a transesophageal echocardiogram also performed by Dr. Cortez where she felt it was consistent with severe low-flow, low-gradient aortic stenosis with mild aortic insufficiency; however, there was moderate mitral regurgitation by her dictation and mild mitral stenosis and moderately severe tricuspid regurgitation.  His STS score is approximately 3%.  He also has peripheral arterial disease, history of monoclonal gammopathy and previous encephalopathy.      ASSESSMENT AND PLAN:  Overall, the patient, even though his STS put him at low risk, I would put him at least at intermediate if not high-risk surgical candidate due to multiple factors including frailty and lung disease.  He does have concomitant tricuspid valve disease, which is concerning as well.  The severity of his aortic stenosis is in question here.  I personally reviewed the transesophageal  echocardiogram.  While the noncoronary cusp is frozen, there is good mobility of the right coronary cusp and I would put it at more moderately severe.  The dobutamine echo is hard to interpret, and I would ask our echocardiography friends to interpret this.  If it appears to be more in the severe range, we could consider proceeding with a TAVR CT scan as our initial thought and present him at conference to discuss what his best options may be.  At this point in time, he is relatively asymptomatic, best I can tell; however, he is a poor historian, making the whole history somewhat of a question and in doubt.         MILADYS ASH MD [facility]             D: 2019   T: 2019   MT: COLLEEN      Name:     PHANI AVITIA   MRN:      [id number]        Account:      [id number]   :      1945           Service Date: 2019      Document: [id number]

## 2019-08-06 NOTE — LETTER
8/6/2019      Danny Paige MD, MD  [address]  [facility] [address]      RE: Efrem Ramos       Dear Colleague,    I had the pleasure of seeing Efrem Ramos in the [facility].    Service Date: 08/06/2019      HISTORY OF PRESENT ILLNESS:  Mr. Ramos is a 74-year-old gentleman who is referred by Dr. Xiong for consideration of transcatheter aortic valve.  He has a history of atrial fibrillation, prior stroke, interstitial lung disease, obstructive sleep apnea, COPD, peripheral arterial disease, walks and/or lives in a walker and looks older than his stated age, who comes in with his wife and son.  He states his complaint is that of fatigue, but it has been constant for 5 years.  Mr. Ramos denies any chest pain, chest pressure.  He does have chronic dyspnea, but again this has been there for 5 years.  He has chronic interstitial lung disease thought secondary to amiodarone therapy.      His echocardiograms have always shown moderate aortic stenosis with gradients in the teens.  His most recent transthoracic study from June showed a gradient of 17, prior 20; however dobutamine stress echo done on 07/01/2019 on low dose increased gradient of 30.  There is no dictation of what happened to the valve area.  It did induce atrial fibrillation.  He had a transesophageal echocardiogram also performed by Dr. Cortez where she felt it was consistent with severe low-flow, low-gradient aortic stenosis with mild aortic insufficiency; however, there was moderate mitral regurgitation by her dictation and mild mitral stenosis and moderately severe tricuspid regurgitation.  His STS score is approximately 3%.  He also has peripheral arterial disease, history of monoclonal gammopathy and previous encephalopathy.      ASSESSMENT AND PLAN:  Overall, the patient, even though his STS put him at low risk, I would put him at least at intermediate if not high-risk surgical candidate due to  multiple factors including frailty and lung disease.  He does have concomitant tricuspid valve disease, which is concerning as well.  The severity of his aortic stenosis is in question here.  I personally reviewed the transesophageal echocardiogram.  While the noncoronary cusp is frozen, there is good mobility of the right coronary cusp and I would put it at more moderately severe.  The dobutamine echo is hard to interpret, and I would ask our echocardiography friends to interpret this.  If it appears to be more in the severe range, we could consider proceeding with a TAVR CT scan as our initial thought and present him at conference to discuss what his best options may be.  At this point in time, he is relatively asymptomatic, best I can tell; however, he is a poor historian, making the whole history somewhat of a question and in doubt.         MILADYS ASH MD [facility]             D: 2019   T: 2019   MT: COLLEEN      Name:     PHANI AVITIA   MRN:      [id number]        Account:      [id number]   :      1945           Service Date: 2019      Document: [id number]         Outpatient Encounter Medications as of 2019   Medication Sig Dispense Refill     acetaminophen (TYLENOL) 325 MG tablet Take 325-650 mg by mouth every 6 hours as needed for mild pain       apixaban ANTICOAGULANT (ELIQUIS) 5 MG tablet Take 1 tablet (5 mg) by mouth 2 times daily 180 tablet 3     ASPIRIN PO Take 81 mg by mouth every evening        atorvastatin (LIPITOR) 40 MG tablet Take 1 tablet (40 mg) by mouth daily 90 tablet 3     calcium carbonate 600 mg-vitamin D 400 units (CALTRATE) 600-400 MG-UNIT per tablet Take 1 tablet by mouth 2 times daily       Cyanocobalamin 2000 MCG TABS Take 2,000 mcg by mouth every 7 days On Sundays       gabapentin (NEURONTIN) 300 MG capsule Take 2 capsules (600 mg) by mouth every evening 60 capsule 5     loperamide (IMODIUM) 2 MG capsule Take 2 mg by mouth 4 times daily as needed for  diarrhea       melatonin 1 MG TABS tablet Take 1-3 mg by mouth nightly as needed for sleep       metoprolol succinate ER (TOPROL-XL) 25 MG 24 hr tablet Take 0.5 tablets (12.5 mg) by mouth daily 45 tablet 3     multivitamin (OCUVITE) TABS Take 1 tablet by mouth 2 times daily        polyethylene glycol (MIRALAX/GLYCOLAX) powder Take 1 capful by mouth daily as needed for constipation       senna-docusate (SENOKOT-S;PERICOLACE) 8.6-50 MG per tablet Take 2 tablets by mouth 2 times daily 120 tablet 3     [] potassium chloride ER (K-TAB/KLOR-CON) 10 MEQ CR tablet Take 4 tablets (40 mEq) by mouth daily for 3 days 12 tablet 0     No facility-administered encounter medications on file as of 2019.        Again, thank you for allowing me to participate in the care of your patient.      Sincerely,    MILADYS ASH MD     [facility]

## 2019-08-07 DIAGNOSIS — I35.0 AORTIC VALVE STENOSIS, ETIOLOGY OF CARDIAC VALVE DISEASE UNSPECIFIED: Primary | ICD-10-CM

## 2019-08-07 NOTE — PROGRESS NOTES
has reviewed echo, EMILIANO and Dobutamine echo with Dr. Juarez.  Both feel his aortic valve looks restricted and is calcified, but his measurements are showing mod-severe AS.  He has not reached severe aortic stenosis at this time.  The plan is for patient to have repeat echo and to be seen in TAVR clinic in 6 months.

## 2019-08-12 ENCOUNTER — ONCOLOGY VISIT (OUTPATIENT)
Dept: ONCOLOGY | Facility: CLINIC | Age: 74
End: 2019-08-12
Attending: INTERNAL MEDICINE
Payer: MEDICARE

## 2019-08-12 ENCOUNTER — PRE VISIT (OUTPATIENT)
Dept: ONCOLOGY | Facility: CLINIC | Age: 74
End: 2019-08-12

## 2019-08-12 ENCOUNTER — HOSPITAL ENCOUNTER (OUTPATIENT)
Facility: CLINIC | Age: 74
Setting detail: SPECIMEN
End: 2019-08-12
Attending: INTERNAL MEDICINE
Payer: MEDICARE

## 2019-08-12 VITALS
DIASTOLIC BLOOD PRESSURE: 60 MMHG | OXYGEN SATURATION: 96 % | RESPIRATION RATE: 16 BRPM | WEIGHT: 158 LBS | HEIGHT: 68 IN | HEART RATE: 62 BPM | TEMPERATURE: 97.6 F | BODY MASS INDEX: 23.95 KG/M2 | SYSTOLIC BLOOD PRESSURE: 109 MMHG

## 2019-08-12 DIAGNOSIS — R91.8 PULMONARY NODULES: Primary | ICD-10-CM

## 2019-08-12 PROCEDURE — G0463 HOSPITAL OUTPT CLINIC VISIT: HCPCS

## 2019-08-12 ASSESSMENT — PAIN SCALES - GENERAL: PAINLEVEL: MODERATE PAIN (4)

## 2019-08-12 ASSESSMENT — MIFFLIN-ST. JEOR: SCORE: 1421.18

## 2019-08-12 NOTE — NURSING NOTE
"Oncology Rooming Note    August 12, 2019 1:18 PM   Efrem Ramos is a 74 year old male who presents for:    Chief Complaint   Patient presents with     Oncology Clinic Visit     New Patient consult     Initial Vitals: /60   Pulse 62   Temp 97.6  F (36.4  C) (Tympanic)   Resp 16   Ht 1.727 m (5' 8\")   Wt 71.7 kg (158 lb)   SpO2 96%   BMI 24.02 kg/m   Estimated body mass index is 24.02 kg/m  as calculated from the following:    Height as of this encounter: 1.727 m (5' 8\").    Weight as of this encounter: 71.7 kg (158 lb). Body surface area is 1.85 meters squared.  Moderate Pain (4) Comment: Data Unavailable   No LMP for male patient.  Allergies reviewed: Yes  Medications reviewed: Yes    Medications: Medication refills not needed today.  Pharmacy name entered into [employer]: [id number] - [place], [place] - [address] AT [address]    Clinical concerns: New Patient consult       Bella Curry CMA              "

## 2019-08-12 NOTE — PATIENT INSTRUCTIONS
The white spots on the lung are probably mucus. I recommend we just keep an eye on them in about 6 months with a CT scan. If you want to minimize appointments, okay to just get CT scan and visit with medical oncology.

## 2019-08-12 NOTE — PROGRESS NOTES
[facility] Initial Visit    Reason for Visit  Efrem Ramos is a 74 year old male who is referred by Dr Aldana for abnormal lung CT  Pulmonary HPI    Seen for MGUS surveillance. In 2016 he had lung biopsy showing DAD/organizing pneumonia. 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  The patient was seen and examined by Katie Abad MD   Current Outpatient Medications   Medication     acetaminophen (TYLENOL) 325 MG tablet     apixaban ANTICOAGULANT (ELIQUIS) 5 MG tablet     ASPIRIN PO     atorvastatin (LIPITOR) 40 MG tablet     calcium carbonate 600 mg-vitamin D 400 units (CALTRATE) 600-400 MG-UNIT per tablet     Cyanocobalamin 2000 MCG TABS     gabapentin (NEURONTIN) 300 MG capsule     loperamide (IMODIUM) 2 MG capsule     melatonin 1 MG TABS tablet     metoprolol succinate ER (TOPROL-XL) 25 MG 24 hr tablet     multivitamin (OCUVITE) TABS     polyethylene glycol (MIRALAX/GLYCOLAX) powder     senna-docusate (SENOKOT-S;PERICOLACE) 8.6-50 MG per tablet     No current facility-administered medications for this visit.      Allergies   Allergen Reactions     Lasix [Furosemide] Other (See Comments) and Swelling     Throat swelling, wheezing     Sulfa Drugs Difficulty breathing     Adhesive  Tape Blisters     Amiodarone Other (See Comments)     Developed pleural effusion     Penicillins Unknown     Reaction occurred as a child     Social History     Socioeconomic History     Marital status:      Spouse name: Not on file     Number of children: Not on file     Years of education: Not on file     Highest education level: Not on file   Occupational History     Not on file   Social Needs     Financial resource strain: Not on file     Food insecurity:     Worry: Not on file     Inability: Not on file     Transportation needs:     Medical: Not on file     Non-medical: Not on file   Tobacco Use     Smoking status: Former Smoker     Packs/day: 1.00     Years: 30.00     Pack years: 30.00     Types: Cigarettes     Last attempt to quit: 2013     Years since quittin.0     Smokeless tobacco: Never Used   Substance and Sexual Activity     Alcohol use: Yes     Alcohol/week: 25.2 oz     Types: 42 Standard drinks or equivalent per week     Comment: occ     Drug use: No     Sexual activity: Not Currently   Lifestyle     Physical activity:     Days per week: Not on file     Minutes per session: Not on file     Stress: Not on file   Relationships     Social connections:     Talks on phone: Not on file     Gets together: Not on file     Attends [employer] service: Not on file     Active member of club or organization: Not on file     Attends meetings of clubs or organizations: Not on file     Relationship status: Not on file     Intimate partner violence:     Fear of current or ex partner: Not on file     Emotionally abused: Not on file     Physically abused: Not on file     Forced sexual activity: Not on file   Other Topics Concern     Parent/sibling w/ CABG, MI or angioplasty before 65F 55M? Not Asked      Service Not Asked     Blood Transfusions Not Asked     Caffeine Concern Yes     Comment: 1 Coke occasionally      Occupational Exposure Not Asked     Hobby Hazards Not Asked     Sleep Concern Not  Asked     Stress Concern Not Asked     Weight Concern Not Asked     Special Diet No     Back Care Not Asked     Exercise No     Bike Helmet Not Asked     Seat Belt Not Asked     Self-Exams Not Asked   Social History Narrative     Not on file     Past Medical History:   Diagnosis Date     Atrial fibrillation (H)      Benign essential hypertension 11/20/2018     Cancer (H) vocal cord     Carpal tunnel syndrome     abstracted 7/3/02.     CVA (cerebral infarction) 5/5/2015     Demyelinating disease of central nervous system, unspecified (H)     abstracted 7/3/02.     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     Pneumonia 8/17/2016     PVD (peripheral vascular disease) (H)      Redundant colon     needs CT colonography     S/P coronary angiogram 09/05/2017     Shingles      SKIN DISORDERS NEC 3/15/2005     Sleep apnea      Past Surgical History:   Procedure Laterality Date     BIOPSY  brain 2002     BONE MARROW BIOPSY, BONE SPECIMEN, NEEDLE/TROCAR N/A 6/8/2017    Procedure: BIOPSY BONE MARROW;  UNILATERAL BONE MARROW BIOPSY (CONSCIOUS SEDATION) ;  Surgeon: Jamie Gonzales MD;  Location:  GI     C NONSPECIFIC PROCEDURE  as a child    T & A. abstracted 7/3/02.     C NONSPECIFIC PROCEDURE  early    CTR. abstracted 7/3/02.     CARDIAC CATHERIZATION  09/05/2017    2V CAD, IFR of RCA 0.95     ESOPHAGOSCOPY, GASTROSCOPY, DUODENOSCOPY (EGD), COMBINED N/A 9/8/2018    Procedure: COMBINED ESOPHAGOSCOPY, GASTROSCOPY, DUODENOSCOPY (EGD), BIOPSY SINGLE OR MULTIPLE;;  Surgeon: Xander Marie,  "MD;  Location:  GI     HEAD & NECK SURGERY       ORTHOPEDIC SURGERY      right arm ulna reset after injury     THORACOSCOPIC WEDGE RESECTION LUNG Right 8/2/2016    Procedure: THORACOSCOPIC WEDGE RESECTION LUNG;  Surgeon: Abdelrahman Noriega MD;  Location:  OR     Family History   Problem Relation Age of Onset     Blood Disease Mother         Anemia     Cardiovascular Father      Cancer - colorectal Maternal Grandfather      Hypertension Brother      Diabetes Sister 5        Juvinile Diabetes passed at 36     Respiratory Other         Lung Cancer       Exam:   /60   Pulse 62   Temp 97.6  F (36.4  C) (Tympanic)   Resp 16   Ht 1.727 m (5' 8\")   Wt 71.7 kg (158 lb)   SpO2 96%   BMI 24.02 kg/m    GENERAL APPEARANCE: Well developed, well nourished, alert, and in no apparent distress.  EYES: PERRL, EOMI  HENT: Nasal mucosa with no edema and no hyperemia. No nasal polyps.  EARS: Canals clear, TMs normal  MOUTH: Oral mucosa is moist, without any lesions, no tonsillar enlargement, no oropharyngeal exudate.  NECK: supple, no masses, no thyromegaly.  LYMPHATICS: No significant axillary, cervical, or supraclavicular nodes.  RESP: normal percussion, good air flow throughout.  No crackles. No rhonchi. No wheezes.  CV: Normal S1, S2, regular rhythm, normal rate. Systolic murmur.  No rub. No gallop. No LE edema.   ABDOMEN:  Bowel sounds normal, soft, nontender, no HSM or masses.   MS: extremities normal. No clubbing. No cyanosis.  SKIN: no rash on limited exam  NEURO: Mentation intact, speech normal, normal strength and tone, normal gait and stance  PSYCH: mentation appears normal. and affect normal/bright  Results:  - My interpretation of the images relevant for this visit includes: CT 7/16 shows multiple abnormalities including bronchiectasis, mucus plugging, peripheral fibrosis, emphysema      Assessment and plan: Alfredo is a 75 yo male with abnormal chest CT. He is rather frail, minimally symptomatic.   Lung " nodule - seen on chest CT, initially Jan 2019 then more conspicuous in July 2019. Not seen in 2016.    Considering patient with high risk for lung cancer, recommend follow up with CT in  6 months.

## 2019-08-12 NOTE — LETTER
8/12/2019         RE: Efrem Ramos  [address]  [facility] [address]        Dear Colleague,    Thank you for referring your patient, Efrem Ramos, to the [facility]. Please see a copy of my visit note below.    [facility] Initial Visit    Reason for Visit  Efrem Ramos is a 74 year old male who is referred by Dr Aldana for abnormal lung CT  Pulmonary HPI    Seen for MGUS surveillance. In 2016 he had lung biopsy showing DAD/organizing pneumonia. 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  The patient was seen and examined by Katie Abad MD   Current Outpatient Medications   Medication     acetaminophen (TYLENOL) 325 MG tablet     apixaban ANTICOAGULANT (ELIQUIS) 5 MG tablet     ASPIRIN PO     atorvastatin (LIPITOR) 40 MG tablet     calcium carbonate 600 mg-vitamin D 400 units (CALTRATE) 600-400 MG-UNIT per tablet     Cyanocobalamin 2000 MCG TABS     gabapentin (NEURONTIN) 300 MG capsule     loperamide (IMODIUM) 2 MG capsule     melatonin 1 MG TABS tablet     metoprolol succinate ER (TOPROL-XL) 25 MG 24 hr tablet     multivitamin (OCUVITE) TABS     polyethylene glycol (MIRALAX/GLYCOLAX) powder     senna-docusate (SENOKOT-S;PERICOLACE)  8.6-50 MG per tablet     No current facility-administered medications for this visit.      Allergies   Allergen Reactions     Lasix [Furosemide] Other (See Comments) and Swelling     Throat swelling, wheezing     Sulfa Drugs Difficulty breathing     Adhesive Tape Blisters     Amiodarone Other (See Comments)     Developed pleural effusion     Penicillins Unknown     Reaction occurred as a child     Social History     Socioeconomic History     Marital status:      Spouse name: Not on file     Number of children: Not on file     Years of education: Not on file     Highest education level: Not on file   Occupational History     Not on file   Social Needs     Financial resource strain: Not on file     Food insecurity:     Worry: Not on file     Inability: Not on file     Transportation needs:     Medical: Not on file     Non-medical: Not on file   Tobacco Use     Smoking status: Former Smoker     Packs/day: 1.00     Years: 30.00     Pack years: 30.00     Types: Cigarettes     Last attempt to quit: 2013     Years since quittin.0     Smokeless tobacco: Never Used   Substance and Sexual Activity     Alcohol use: Yes     Alcohol/week: 25.2 oz     Types: 42 Standard drinks or equivalent per week     Comment: occ     Drug use: No     Sexual activity: Not Currently   Lifestyle     Physical activity:     Days per week: Not on file     Minutes per session: Not on file     Stress: Not on file   Relationships     Social connections:     Talks on phone: Not on file     Gets together: Not on file     Attends Yazidism service: Not on file     Active member of club or organization: Not on file     Attends meetings of clubs or organizations: Not on file     Relationship status: Not on file     Intimate partner violence:     Fear of current or ex partner: Not on file     Emotionally abused: Not on file     Physically abused: Not on file     Forced sexual activity: Not on file   Other Topics Concern     Parent/sibling w/  CABG, MI or angioplasty before 65F 55M? Not Asked      Service Not Asked     Blood Transfusions Not Asked     Caffeine Concern Yes     Comment: 1 Coke occasionally      Occupational Exposure Not Asked     Hobby Hazards Not Asked     Sleep Concern Not Asked     Stress Concern Not Asked     Weight Concern Not Asked     Special Diet No     Back Care Not Asked     Exercise No     Bike Helmet Not Asked     Seat Belt Not Asked     Self-Exams Not Asked   Social History Narrative     Not on file     Past Medical History:   Diagnosis Date     Atrial fibrillation (H)      Benign essential hypertension 11/20/2018     Cancer (H) vocal cord     Carpal tunnel syndrome     abstracted 7/3/02.     CVA (cerebral infarction) 5/5/2015     Demyelinating disease of central nervous system, unspecified (H)     abstracted 7/3/02.     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     Pneumonia 8/17/2016     PVD (peripheral vascular disease) (H)      Redundant colon     needs CT colonography     S/P coronary angiogram 09/05/2017     Shingles      SKIN DISORDERS NEC 3/15/2005     Sleep apnea      Past Surgical History:   Procedure Laterality Date     BIOPSY  brain 2002     BONE MARROW BIOPSY, BONE SPECIMEN, NEEDLE/TROCAR N/A 6/8/2017    Procedure: BIOPSY BONE MARROW;  UNILATERAL BONE MARROW BIOPSY (CONSCIOUS SEDATION) ;  Surgeon: Jamie Gonzales MD;  Location: [facility]     C NONSPECIFIC PROCEDURE  as a child    T & A. abstracted 7/3/02.     C NONSPECIFIC PROCEDURE  early    CTR. abstracted 7/3/02.     CARDIAC  "CATHERIZATION  09/05/2017    2V CAD, IFR of RCA 0.95     ESOPHAGOSCOPY, GASTROSCOPY, DUODENOSCOPY (EGD), COMBINED N/A 9/8/2018    Procedure: COMBINED ESOPHAGOSCOPY, GASTROSCOPY, DUODENOSCOPY (EGD), BIOPSY SINGLE OR MULTIPLE;;  Surgeon: Xander Marie MD;  Location:  GI     HEAD & NECK SURGERY       ORTHOPEDIC SURGERY      right arm ulna reset after injury     THORACOSCOPIC WEDGE RESECTION LUNG Right 8/2/2016    Procedure: THORACOSCOPIC WEDGE RESECTION LUNG;  Surgeon: Abdelrahman Noriega MD;  Location:  OR     Family History   Problem Relation Age of Onset     Blood Disease Mother         Anemia     Cardiovascular Father      Cancer - colorectal Maternal Grandfather      Hypertension Brother      Diabetes Sister 5        Juvinile Diabetes passed at 36     Respiratory Other         Lung Cancer       Exam:   /60   Pulse 62   Temp 97.6  F (36.4  C) (Tympanic)   Resp 16   Ht 1.727 m (5' 8\")   Wt 71.7 kg (158 lb)   SpO2 96%   BMI 24.02 kg/m     GENERAL APPEARANCE: Well developed, well nourished, alert, and in no apparent distress.  EYES: PERRL, EOMI  HENT: Nasal mucosa with no edema and no hyperemia. No nasal polyps.  EARS: Canals clear, TMs normal  MOUTH: Oral mucosa is moist, without any lesions, no tonsillar enlargement, no oropharyngeal exudate.  NECK: supple, no masses, no thyromegaly.  LYMPHATICS: No significant axillary, cervical, or supraclavicular nodes.  RESP: normal percussion, good air flow throughout.  No crackles. No rhonchi. No wheezes.  CV: Normal S1, S2, regular rhythm, normal rate. Systolic murmur.  No rub. No gallop. No LE edema.   ABDOMEN:  Bowel sounds normal, soft, nontender, no HSM or masses.   MS: extremities normal. No clubbing. No cyanosis.  SKIN: no rash on limited exam  NEURO: Mentation intact, speech normal, normal strength and tone, normal gait and stance  PSYCH: mentation appears normal. and affect normal/bright  Results:  - My interpretation of the images " relevant for this visit includes: CT 7/16 shows multiple abnormalities including bronchiectasis, mucus plugging, peripheral fibrosis, emphysema      Assessment and plan: Alfredo is a 75 yo male with abnormal chest CT. He is rather frail, minimally symptomatic.   Lung nodule - seen on chest CT, initially Jan 2019 then more conspicuous in July 2019. Not seen in 2016.    Considering patient with high risk for lung cancer, recommend follow up with CT in  6 months.     Again, thank you for allowing me to participate in the care of your patient.        Sincerely,        Katie Abad MD

## 2019-08-14 ENCOUNTER — OFFICE VISIT (OUTPATIENT)
Dept: SLEEP MEDICINE | Facility: CLINIC | Age: 74
End: 2019-08-14
Payer: MEDICARE

## 2019-08-14 VITALS
OXYGEN SATURATION: 92 % | HEART RATE: 60 BPM | WEIGHT: 158 LBS | DIASTOLIC BLOOD PRESSURE: 71 MMHG | SYSTOLIC BLOOD PRESSURE: 117 MMHG | BODY MASS INDEX: 23.95 KG/M2 | RESPIRATION RATE: 16 BRPM | HEIGHT: 68 IN

## 2019-08-14 DIAGNOSIS — G47.33 OSA (OBSTRUCTIVE SLEEP APNEA): Primary | ICD-10-CM

## 2019-08-14 PROCEDURE — 99213 OFFICE O/P EST LOW 20 MIN: CPT | Performed by: INTERNAL MEDICINE

## 2019-08-14 ASSESSMENT — MIFFLIN-ST. JEOR: SCORE: 1421.18

## 2019-08-14 NOTE — NURSING NOTE
"Chief Complaint   Patient presents with     Sleep Apnea     CPAP Follow Up       Initial /71   Pulse 60   Resp 16   Ht 1.727 m (5' 8\")   Wt 71.7 kg (158 lb)   SpO2 92%   BMI 24.02 kg/m   Estimated body mass index is 24.02 kg/m  as calculated from the following:    Height as of this encounter: 1.727 m (5' 8\").    Weight as of this encounter: 71.7 kg (158 lb).    Medication Reconciliation: complete     ESS 8  Kendal Smiley        "

## 2019-08-14 NOTE — PATIENT INSTRUCTIONS
Your Body mass index is 24.02 kg/m .  Weight management is a personal decision.  If you are interested in exploring weight loss strategies, the following discussion covers the approaches that may be successful. Body mass index (BMI) is one way to tell whether you are at a healthy weight, overweight, or obese. It measures your weight in relation to your height.  A BMI of 18.5 to 24.9 is in the healthy range. A person with a BMI of 25 to 29.9 is considered overweight, and someone with a BMI of 30 or greater is considered obese. More than two-thirds of American adults are considered overweight or obese.  Being overweight or obese increases the risk for further weight gain. Excess weight may lead to heart disease and diabetes.  Creating and following plans for healthy eating and physical activity may help you improve your health.  Weight control is part of healthy lifestyle and includes exercise, emotional health, and healthy eating habits. Careful eating habits lifelong are the mainstay of weight control. Though there are significant health benefits from weight loss, long-term weight loss with diet alone may be very difficult to achieve- studies show long-term success with dietary management in less than 10% of people. Attaining a healthy weight may be especially difficult to achieve in those with severe obesity. In some cases, medications, devices and surgical management might be considered.  What can you do?  If you are overweight or obese and are interested in methods for weight loss, you should discuss this with your provider.     Consider reducing daily calorie intake by 500 calories.     Keep a food journal.     Avoiding skipping meals, consider cutting portions instead.    Diet combined with exercise helps maintain muscle while optimizing fat loss. Strength training is particularly important for building and maintaining muscle mass. Exercise helps reduce stress, increase energy, and improves fitness.  Increasing exercise without diet control, however, may not burn enough calories to loose weight.       Start walking three days a week 10-20 minutes at a time    Work towards walking thirty minutes five days a week     Eventually, increase the speed of your walking for 1-2 minutes at time    In addition, we recommend that you review healthy lifestyles and methods for weight loss available through the National Institutes of Health patient information sites:  http://win.niddk.nih.gov/publications/index.htm    And look into health and wellness programs that may be available through your health insurance provider, employer, local community center, or anna club.    Weight management plan: Patient was referred to their PCP to discuss a diet and exercise plan.

## 2019-08-14 NOTE — PROGRESS NOTES
Obstructive Sleep Apnea - PAP Follow-Up Visit:    Chief Complaint   Patient presents with     Sleep Apnea       Efrem Ramos comes in today for follow-up of their severe sleep apnea, prescribed CPAP.     PSG on 7/20/2015 showed an AHI of 43.4 per hour.     Medical history is significant for stroke , atrial fibrillation/flutter, aortic stenosis, coronary artery disease, hypertension, hyperlipidemia,  COPD, PVD and MGUS.     Patient has been using CPAP consistently in our last meeting in August 2018. However, since then he has fallen off regular compliance. He has not used the device in many months.     He complains of having dry mouth which interferes with CPAP use.     Bedtime is typically 1 am. Usually it takes about 10 minutes to fall asleep with the mask on. Wake time is typically 8 am.  The patient is usually getting 7 hours of sleep per night.    [place]bent Sleepiness Scale: 8/24      Reviewed by team:      Reviewed by provider:        Problem List:  Patient Active Problem List    Diagnosis Date Noted     Coronary artery disease involving native coronary artery of native heart with angina pectoris (H) 01/16/2019     Priority: Medium     Other secondary pulmonary hypertension (H) 01/16/2019     Priority: Medium     Edema due to malnutrition, due to unspecified malnutrition type (H) 01/16/2019     Priority: Medium     Benign essential hypertension 11/20/2018     Priority: Medium     Non-traumatic compression fracture of L1 lumbar vertebra with routine healing, subsequent encounter 10/09/2018     Priority: Medium     Age-related osteoporosis with current pathological fracture with routine healing, subsequent encounter 10/09/2018     Priority: Medium     Hypokalemia 09/05/2018     Priority: Medium     CAD (coronary artery disease)-moderate  nonobstructive 2 vessel 09/18/2017     Priority: Medium     Pneumonia 08/17/2016     Priority: Medium     Acute respiratory failure with hypoxia (H) 08/10/2016     Priority:  Medium     Physical deconditioning 08/10/2016     Priority: Medium     Urinary retention 08/10/2016     Priority: Medium     Community acquired pneumonia 08/10/2016     Priority: Medium     Constipation 08/10/2016     Priority: Medium     Hypoxia 07/27/2016     Priority: Medium     Atrial fibrillation (H) 06/28/2016     Priority: Medium     Health Care Home 12/10/2015     Priority: Medium     State Tier Level:  unknown  Status:  Unable to reach  Care Coordinator:  Denisse Gonzales    See Letters for [facility] Care Plan  Date:  December 10, 2015           Atrial fibrillation and flutter (H) 12/07/2015     Priority: Medium     Alcohol abuse 08/11/2015     Priority: Medium     Cerebral infarction (H) 06/09/2015     Priority: Medium     Diagnosis updated by automated process. Provider to review and confirm.       Rash and nonspecific skin eruption 12/06/2013     Priority: Medium     Vitamin B12 deficiency disease 09/10/2013     Priority: Medium     Elevated ferritin 09/10/2013     Priority: Medium     Collagenous colitis 09/10/2013     Priority: Medium     Redundant colon      Priority: Medium     Lymphocytic colitis 08/18/2013     Priority: Medium     Mitral valve problem 08/18/2013     Priority: Medium     TRANSTHORACIC ECHOCARDIOGRAM 08/2013 There is a linear strand like projection seen in the LV cavity in diastole that I suspect is the posterior mitral leaflet although I cannot exclude a torn chordae or small vegetation         IgG monoclonal gammopathy 08/18/2013     Priority: Medium     MGUS        CARDIOVASCULAR SCREENING; LDL GOAL LESS THAN 100 08/18/2013     Priority: Medium     Vocal cord cancer (H) 07/31/2013     Priority: Medium     07/2013       Lower extremity edema 07/31/2013     Priority: Medium     Anemia 07/23/2013     Priority: Medium     MEL (obstructive sleep apnea) 07/22/2013     Priority: Medium     PVD (peripheral vascular disease) (H) 07/22/2013     Priority: Medium     Decreased diffusion capacity  "07/22/2013     Priority: Medium     Encephalopathy 03/15/2005     Priority: Medium     Problem list name updated by automated process. Provider to review       Mixed hyperlipidemia 03/15/2005     Priority: Medium     Other specified disorder of skin 03/15/2005     Priority: Medium          /71   Pulse 60   Resp 16   Ht 1.727 m (5' 8\")   Wt 71.7 kg (158 lb)   SpO2 92%   BMI 24.02 kg/m      Impression/Plan:     Severe sleep apnea.     - Not tolerating CPAP at this time. Dry mouth is the main problem for which he will try increased humidity and an oral rinse. He will try to restart CPAP and adherent with therapy.     - Alternate therapy options were reviewed, mainly dental appliance. He will try to improve PAP adherence but can consider dental appliance if unable to tolerate CPAP.       Fifteen minutes spent with patient, all of which were spent face-to-face counseling, consulting, coordinating plan of care.      Ron Pacheco MD, MD    CC:  Danny Paige,   "

## 2019-09-29 ENCOUNTER — HEALTH MAINTENANCE LETTER (OUTPATIENT)
Age: 74
End: 2019-09-29

## 2019-10-14 DIAGNOSIS — G25.81 RESTLESS LEGS SYNDROME (RLS): ICD-10-CM

## 2019-10-14 RX ORDER — GABAPENTIN 300 MG/1
600 CAPSULE ORAL EVERY EVENING
Qty: 60 CAPSULE | Refills: 5 | Status: SHIPPED | OUTPATIENT
Start: 2019-10-14 | End: 2020-04-06

## 2019-10-14 NOTE — TELEPHONE ENCOUNTER
Pending Prescriptions:                       Disp   Refills    gabapentin (NEURONTIN) 300 MG capsule     60 cap*5            Sig: Take 2 capsules (600 mg) by mouth every evening          Last Written Prescription Date:  04/08/2019  Last Fill Quantity: 60,   # refills: 5  Future Office visit:    Next 5 appointments (look out 90 days)    Nov 21, 2019  2:45 PM CST  Return Visit with Severo Xiong MD  [facility] ([facility]) [address]  [facility] [id number] [phone] OPT 2   Jan 08, 2020  1:10 PM CST  Return Visit with  LAB DRAW 1  [facility] ([facility]) [facility]  [address]  [place] [facility] [address]  [phone]           Routing refill request to provider for review/approval because:  Drug not on the [facility], [facility] or [facility] refill protocol or controlled substance

## 2019-11-21 ENCOUNTER — OFFICE VISIT (OUTPATIENT)
Dept: CARDIOLOGY | Facility: CLINIC | Age: 74
End: 2019-11-21
Attending: INTERNAL MEDICINE
Payer: MEDICARE

## 2019-11-21 VITALS
DIASTOLIC BLOOD PRESSURE: 79 MMHG | SYSTOLIC BLOOD PRESSURE: 140 MMHG | WEIGHT: 164.8 LBS | HEART RATE: 65 BPM | OXYGEN SATURATION: 96 % | BODY MASS INDEX: 24.98 KG/M2 | HEIGHT: 68 IN

## 2019-11-21 DIAGNOSIS — I35.0 AORTIC VALVE STENOSIS, ETIOLOGY OF CARDIAC VALVE DISEASE UNSPECIFIED: ICD-10-CM

## 2019-11-21 PROCEDURE — 99214 OFFICE O/P EST MOD 30 MIN: CPT | Performed by: INTERNAL MEDICINE

## 2019-11-21 RX ORDER — FUROSEMIDE 20 MG
20 TABLET ORAL DAILY
Qty: 60 TABLET | Refills: 1 | Status: SHIPPED | OUTPATIENT
Start: 2019-11-21 | End: 2020-03-13

## 2019-11-21 RX ORDER — POTASSIUM CHLORIDE 1500 MG/1
20 TABLET, EXTENDED RELEASE ORAL DAILY
Qty: 90 TABLET | Refills: 0 | Status: SHIPPED | OUTPATIENT
Start: 2019-11-21 | End: 2020-02-19

## 2019-11-21 ASSESSMENT — MIFFLIN-ST. JEOR: SCORE: 1452.03

## 2019-11-21 NOTE — LETTER
11/21/2019    Danny Paige MD, MD  [address]  [facility] [address]    RE: Efrem Ramos       Dear Colleague,    I had the pleasure of seeing Efrem Ramos in the [facility].    HPI and Plan:     Today I had the pleasure of seeing Efrem Ramos at [facility] in [place]. He is a pleasant 73 year old patient with a past medical history of left PCA stroke in 2013, paroxsymal atrial fibrillation/flutter currently on Eliquis, acute disseminated encephalomyelitis in 2003, obstructive sleep apnea, hypertension, hyperlipidemia, colitis, peripheral vascular disease involving rt superficial femoral artery not amenable to PCI, severe aortic stenosis, moderate-severe tricuspid regurgitation and mild MS/MR who presents to the clinic for routine follow-up.  The patient was previously seen by Dr. Chavis and Ms. Marya Ford.     He had an echocardiogram prior to the visit with me in 07/2019.  Echocardiogram showed progression of aortic stenosis to severe from mild to moderate previously.  The  aortic valve area  was noted to be 0.9 cm  with a mean gradient of 17 mmHg and SVI of 32 mL/m   indicating low flow low gradient severe aortic stenosis.  I ordered a low-dose dobutamine stress echocardiogram to further evaluate low flow, low gradient AS.  During that study the gradient increased from 20 mmHg at rest to 30 mmHg at peak infusion of 15 mcg.  The test was stopped due to development of rapid atrial fibrillation.  I also ordered a transesophageal echocardiogram which showed severe low flow low gradient aortic stenosis with a mean gradient of 18 mmHg, mild mitral stenosis with moderate mitral regurgitation, moderate to severe tricuspid regurgitation and moderate to severe biatrial enlargement. During my last appointment with him I referred him to to the TAVR clinic.  He was seen by Dr. Bonilla who thought he was intermediate risk for surgical  AVR due to multiple co morbidities, even though his STS score was 2.5%.  He wanted to discuss the echocardiograms during a valve conference to help assist decision making.    Previous work-up has included coronary angiogram in 07/2016 which showed 50% disease in the mid RCA and mid LAD.  IFR was negative and no intervention was performed.  Right heart cath at that time showed pulmonary pressure of 40/18 with a mean of 23, PCWP of 12, RV 35/6, RA 5.     Today, the patient reports being short of breath.  Today he walked from the front door to the elevator which is a 50 feet distance and became significantly E[place] short of breath.  He today denies dizziness although he has reported that in the past.  He has also noticed lower extremities swelling for which he has tried compression stockings in the past but not using them due to discomfort.        Assessment and plan    1.  Severe aortic stenosis (paradoxical low flow low gradient)-  There was an increase in gradient during the stress echocardiogram to 30 mmHg at peak infusion of 15 mcg of dobutamine at a stroke volume index of 41 mL/m2.  The test had to be stopped because of development of atrial fibrillation.  I referred him to TAVR clinic but It was thought that his stenosis was not severe enough to require TAVR yet. Another issue is that he also has mod to severe TR which may be causing his s/s.  He may even be a candidate for TV ring along with surgical AVR. However, will reassess in a few months with repeat echo after optimization of his fluid status.       2.  Paroxysmal atrial fibrillation with high chads vas score  The patient is currently on Eliquis for anticoagulation for primary prevention.  I will continue him on AC and also on metoprolol 25 mg p.o. twice daily.    3.  Moderate, nonobstructive two-vessel coronary artery disease  He had disease of the mid RCA and LAD both of which were negative by IFR.  He is currently on Lipitor and metoprolol and I will  continue both of these.    4.  Hypertension  Well controlled.    5.  Hyperlipidemia  Blood work from today showed LDL of 51 mg/dL.  ALT is normal    6.  Moderate to severe tricuspid regurgitation, mild mitral regurgitation and mitral stenosis  The patient looks volume overloaded today.  I will start him on 20 mg of Lasix daily.  Will uptitrate as tolerated.  We will also start him on 20 mEq of potassium given hypokalemia.  I will have him come back and see our nurse practitioner in 1 month.  I would like him to come back sooner but they are hesitant to come back any sooner than this.  If he is found to have improvement in his lower extremity edema and symptoms he can continue Lasix and regimen the current dose.  Otherwise, he can uptitrate Lasix to 40 mg daily.  Alternatively, if the potassium is low we can start him on low-dose of spironolactone instead and keep Lasix at 20 mg. I also told him to check his weight daily and use Ace wraps on his legs.    Thank you for allowing me to participate in the care of Efrem Xiong MD  Cardiology    Orders Placed This Encounter   Procedures     Basic metabolic panel     Follow-Up with Cardiac Advanced Practice Provider     Follow-Up with Cardiologist       Orders Placed This Encounter   Medications     furosemide (LASIX) 20 MG tablet     Sig: Take 1 tablet (20 mg) by mouth daily     Dispense:  60 tablet     Refill:  1     potassium chloride ER (K-TAB) 20 MEQ CR tablet     Sig: Take 1 tablet (20 mEq) by mouth daily     Dispense:  90 tablet     Refill:  0       There are no discontinued medications.    Encounter Diagnosis   Name Primary?     Aortic valve stenosis, etiology of cardiac valve disease unspecified        CURRENT MEDICATIONS:  Current Outpatient Medications   Medication Sig Dispense Refill     acetaminophen (TYLENOL) 325 MG tablet Take 325-650 mg by mouth every 6 hours as needed for mild pain       apixaban ANTICOAGULANT (ELIQUIS) 5 MG tablet Take  1 tablet (5 mg) by mouth 2 times daily 180 tablet 3     ASPIRIN PO Take 81 mg by mouth every evening        atorvastatin (LIPITOR) 40 MG tablet Take 1 tablet (40 mg) by mouth daily 90 tablet 3     Cyanocobalamin 2000 MCG TABS Take 2,000 mcg by mouth every 7 days On Sundays       furosemide (LASIX) 20 MG tablet Take 1 tablet (20 mg) by mouth daily 60 tablet 1     gabapentin (NEURONTIN) 300 MG capsule Take 2 capsules (600 mg) by mouth every evening 60 capsule 5     loperamide (IMODIUM) 2 MG capsule Take 2 mg by mouth 4 times daily as needed for diarrhea       melatonin 1 MG TABS tablet Take 1-3 mg by mouth nightly as needed for sleep       metoprolol succinate ER (TOPROL-XL) 25 MG 24 hr tablet Take 0.5 tablets (12.5 mg) by mouth daily 45 tablet 3     multivitamin (OCUVITE) TABS Take 1 tablet by mouth 2 times daily        potassium chloride ER (K-TAB) 20 MEQ CR tablet Take 1 tablet (20 mEq) by mouth daily 90 tablet 0     calcium carbonate 600 mg-vitamin D 400 units (CALTRATE) 600-400 MG-UNIT per tablet Take 1 tablet by mouth 2 times daily       polyethylene glycol (MIRALAX/GLYCOLAX) powder Take 1 capful by mouth daily as needed for constipation       senna-docusate (SENOKOT-S;PERICOLACE) 8.6-50 MG per tablet Take 2 tablets by mouth 2 times daily (Patient not taking: Reported on 11/21/2019) 120 tablet 3       ALLERGIES     Allergies   Allergen Reactions     Lasix [Furosemide] Other (See Comments) and Swelling     Throat swelling, wheezing     Sulfa Drugs Difficulty breathing     Adhesive Tape Blisters     Amiodarone Other (See Comments)     Developed pleural effusion     Penicillins Unknown     Reaction occurred as a child       PAST MEDICAL HISTORY:  Past Medical History:   Diagnosis Date     Atrial fibrillation (H)      Benign essential hypertension 11/20/2018     Cancer (H) vocal cord     Carpal tunnel syndrome     abstracted 7/3/02.     CVA (cerebral infarction) 5/5/2015     Demyelinating disease of central nervous  system, unspecified (H)     abstracted 7/3/02.     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     Pneumonia 8/17/2016     PVD (peripheral vascular disease) (H)      Redundant colon     needs CT colonography     S/P coronary angiogram 09/05/2017     Shingles      SKIN DISORDERS NEC 3/15/2005     Sleep apnea        PAST SURGICAL HISTORY:  Past Surgical History:   Procedure Laterality Date     BIOPSY  brain 2002     BONE MARROW BIOPSY, BONE SPECIMEN, NEEDLE/TROCAR N/A 6/8/2017    Procedure: BIOPSY BONE MARROW;  UNILATERAL BONE MARROW BIOPSY (CONSCIOUS SEDATION) ;  Surgeon: Jamie Gonzales MD;  Location:  GI     C NONSPECIFIC PROCEDURE  as a child    T & A. abstracted 7/3/02.     C NONSPECIFIC PROCEDURE  early    CTR. abstracted 7/3/02.     CARDIAC CATHERIZATION  09/05/2017    2V CAD, IFR of RCA 0.95     ESOPHAGOSCOPY, GASTROSCOPY, DUODENOSCOPY (EGD), COMBINED N/A 9/8/2018    Procedure: COMBINED ESOPHAGOSCOPY, GASTROSCOPY, DUODENOSCOPY (EGD), BIOPSY SINGLE OR MULTIPLE;;  Surgeon: Xander aMrie MD;  Location:  GI     HEAD & NECK SURGERY       ORTHOPEDIC SURGERY      right arm ulna reset after injury     THORACOSCOPIC WEDGE RESECTION LUNG Right 8/2/2016    Procedure: THORACOSCOPIC WEDGE RESECTION LUNG;  Surgeon: Abdelrahman Noriega MD;  Location:  OR       FAMILY HISTORY:  Family History   Problem Relation Age of Onset     Blood Disease Mother         Anemia     Cardiovascular Father      Cancer - colorectal Maternal Grandfather      Hypertension Brother      Diabetes  Sister 5        Constance Diabetes passed at 36     Respiratory Other         Lung Cancer       SOCIAL HISTORY:  Social History     Socioeconomic History     Marital status:      Spouse name: None     Number of children: None     Years of education: None     Highest education level: None   Occupational History     None   Social Needs     Financial resource strain: None     Food insecurity:     Worry: None     Inability: None     Transportation needs:     Medical: None     Non-medical: None   Tobacco Use     Smoking status: Former Smoker     Packs/day: 1.00     Years: 30.00     Pack years: 30.00     Types: Cigarettes     Last attempt to quit: 2013     Years since quittin.3     Smokeless tobacco: Never Used   Substance and Sexual Activity     Alcohol use: Yes     Alcohol/week: 42.0 standard drinks     Types: 42 Standard drinks or equivalent per week     Comment: occ     Drug use: No     Sexual activity: Not Currently   Lifestyle     Physical activity:     Days per week: None     Minutes per session: None     Stress: None   Relationships     Social connections:     Talks on phone: None     Gets together: None     Attends [employer] service: None     Active member of club or organization: None     Attends meetings of clubs or organizations: None     Relationship status: None     Intimate partner violence:     Fear of current or ex partner: None     Emotionally abused: None     Physically abused: None     Forced sexual activity: None   Other Topics Concern     Parent/sibling w/ CABG, MI or angioplasty before 65F 55M? Not Asked      Service Not Asked     Blood Transfusions Not Asked     Caffeine Concern Yes     Comment: 1 Coke occasionally      Occupational Exposure Not Asked     Hobby Hazards Not Asked     Sleep Concern Not Asked     Stress Concern Not Asked     Weight Concern Not Asked     Special Diet No     Back Care Not Asked     Exercise No     Bike Helmet Not Asked     Seat Belt Not Asked      "Self-Exams Not Asked   Social History Narrative    Retired (disability)        Review of Systems:  Skin:  Negative       Eyes:  Positive for glasses    ENT:  Negative      Respiratory:  Positive for dyspnea on exertion;sleep apnea;CPAP     Cardiovascular:    Positive for;dizziness;fatigue;chest pain;edema    Gastroenterology: Positive for diarrhea    Genitourinary:  Negative      Musculoskeletal:  Positive for joint pain;arthritis;muscular weakness;back pain    Neurologic:  Positive for stroke;incoordination;memory problems    Psychiatric:  Positive for sleep disturbances;excessive stress;anxiety;depression    Heme/Lymph/Imm:  Positive for allergies Vocal cord CA in 2013.  Endocrine:  Negative        Physical Exam:  Vitals: BP (!) 140/79 (BP Location: Right arm, Patient Position: Sitting, Cuff Size: Adult Regular)   Pulse 65   Ht 1.727 m (5' 8\")   Wt 74.8 kg (164 lb 12.8 oz)   SpO2 96%   BMI 25.06 kg/m      Constitutional: awake, alert, no distress  Skin: Warm and dry to touch  Head: Normocephalic, atraumatic  Eyes: Conjunctivae and lids unremarkable, sclera white  Respiratory: Normal breath sounds, clear to auscultation  Cardiac: Regular rate and rhythm, S1 normal, soft S2, delayed carotid upstrokes, 2/6 late peaking systolic murmur  heard best at the right upper sternal border.   1-2+ b/l pedal edema.   Extremities and musculoskeletal: No gross motor deficit, Poor pulses on the right lower extremity.  Neurological.  Affect normal  Psych: Alert and oriented x3    Recent Lab Results:  LIPID RESULTS:  Lab Results   Component Value Date    CHOL 118 06/24/2019    HDL 51 06/24/2019    LDL 51 06/24/2019    TRIG 80 06/24/2019    CHOLHDLRATIO 2.7 09/04/2015       LIVER ENZYME RESULTS:  Lab Results   Component Value Date    AST 18 07/02/2019    ALT 16 07/02/2019       CBC RESULTS:  Lab Results   Component Value Date    WBC 12.5 (H) 07/02/2019    RBC 3.78 (L) 07/02/2019    HGB 12.4 (L) 07/02/2019    HCT " 35.9 (L) 07/02/2019    MCV 95 07/02/2019    MCH 32.8 07/02/2019    MCHC 34.5 07/02/2019    RDW 14.0 07/02/2019     07/02/2019       BMP RESULTS:  Lab Results   Component Value Date     07/02/2019    POTASSIUM 3.6 07/10/2019    CHLORIDE 107 07/02/2019    CO2 30 07/02/2019    ANIONGAP 4 07/02/2019    GLC 82 07/02/2019    BUN 17 07/02/2019    CR 0.83 07/02/2019    GFRESTIMATED 85 07/02/2019    GFRESTBLACK >90 07/02/2019    ULISSES 8.9 07/02/2019        A1C RESULTS:  Lab Results   Component Value Date    A1C 5.3 09/04/2015       INR RESULTS:  Lab Results   Component Value Date    INR 1.08 10/22/2018    INR 1.02 09/05/2017         All medical records were reviewed in detail and discussed with the patient. Greater than 35 mins were spent with the patient, 50% of this time was spent on counseling and coordination of care.  After visit summary was printed and given to the patient.        Thank you for allowing me to participate in the care of your patient.    Sincerely,     Severo Xiong MD     [facility]

## 2019-11-21 NOTE — PROGRESS NOTES
HPI and Plan:     Today I had the pleasure of seeing Efrem Ramos at [facility] in [place]. He is a pleasant 73 year old patient with a past medical history of left PCA stroke in 2013, paroxsymal atrial fibrillation/flutter currently on Eliquis, acute disseminated encephalomyelitis in 2003, obstructive sleep apnea, hypertension, hyperlipidemia, colitis, peripheral vascular disease involving rt superficial femoral artery not amenable to PCI, severe aortic stenosis, moderate-severe tricuspid regurgitation and mild MS/MR who presents to the clinic for routine follow-up.  The patient was previously seen by Dr. Chavis and Ms. Marya Ford.     He had an echocardiogram prior to the visit with me in 07/2019.  Echocardiogram showed progression of aortic stenosis to severe from mild to moderate previously.  The  aortic valve area  was noted to be 0.9 cm  with a mean gradient of 17 mmHg and SVI of 32 mL/m  indicating low flow low gradient severe aortic stenosis.  I ordered a low-dose dobutamine stress echocardiogram to further evaluate low flow, low gradient AS.  During that study the gradient increased from 20 mmHg at rest to 30 mmHg at peak infusion of 15 mcg.  The test was stopped due to development of rapid atrial fibrillation.  I also ordered a transesophageal echocardiogram which showed severe low flow low gradient aortic stenosis with a mean gradient of 18 mmHg, mild mitral stenosis with moderate mitral regurgitation, moderate to severe tricuspid regurgitation and moderate to severe biatrial enlargement. During my last appointment with him I referred him to to the TAVR clinic.  He was seen by Dr. Bonilla who thought he was intermediate risk for surgical AVR due to multiple co morbidities, even though his STS score was 2.5%.  He wanted to discuss the echocardiograms during a valve conference to help assist decision making.    Previous work-up has included coronary angiogram in 07/2016  which showed 50% disease in the mid RCA and mid LAD.  IFR was negative and no intervention was performed.  Right heart cath at that time showed pulmonary pressure of 40/18 with a mean of 23, PCWP of 12, RV 35/6, RA 5.     Today, the patient reports being short of breath.  Today he walked from the front door to the elevator which is a 50 feet distance and became significantly [place] short of breath.  He today denies dizziness although he has reported that in the past.  He has also noticed lower extremities swelling for which he has tried compression stockings in the past but not using them due to discomfort.        Assessment and plan    1.  Severe aortic stenosis (paradoxical low flow low gradient)-  There was an increase in gradient during the stress echocardiogram to 30 mmHg at peak infusion of 15 mcg of dobutamine at a stroke volume index of 41 mL/m2.  The test had to be stopped because of development of atrial fibrillation.  I referred him to TAVR clinic but It was thought that his stenosis was not severe enough to require TAVR yet. Another issue is that he also has mod to severe TR which may be causing his s/s.  He may even be a candidate for TV ring along with surgical AVR. However, will reassess in a few months with repeat echo after optimization of his fluid status.       2.  Paroxysmal atrial fibrillation with high chads vas score  The patient is currently on Eliquis for anticoagulation for primary prevention.  I will continue him on AC and also on metoprolol 25 mg p.o. twice daily.    3.  Moderate, nonobstructive two-vessel coronary artery disease  He had disease of the mid RCA and LAD both of which were negative by IFR.  He is currently on Lipitor and metoprolol and I will continue both of these.    4.  Hypertension  Well controlled.    5.  Hyperlipidemia  Blood work from today showed LDL of 51 mg/dL.  ALT is normal    6.  Moderate to severe tricuspid regurgitation, mild mitral regurgitation and mitral  stenosis  The patient looks volume overloaded today.  I will start him on 20 mg of Lasix daily.  Will uptitrate as tolerated.  We will also start him on 20 mEq of potassium given hypokalemia.  I will have him come back and see our nurse practitioner in 1 month.  I would like him to come back sooner but they are hesitant to come back any sooner than this.  If he is found to have improvement in his lower extremity edema and symptoms he can continue Lasix and regimen the current dose.  Otherwise, he can uptitrate Lasix to 40 mg daily.  Alternatively, if the potassium is low we can start him on low-dose of spironolactone instead and keep Lasix at 20 mg. I also told him to check his weight daily and use Ace wraps on his legs.    Thank you for allowing me to participate in the care of Efrem Xiong MD  Cardiology    Orders Placed This Encounter   Procedures     Basic metabolic panel     Follow-Up with Cardiac Advanced Practice Provider     Follow-Up with Cardiologist       Orders Placed This Encounter   Medications     furosemide (LASIX) 20 MG tablet     Sig: Take 1 tablet (20 mg) by mouth daily     Dispense:  60 tablet     Refill:  1     potassium chloride ER (K-TAB) 20 MEQ CR tablet     Sig: Take 1 tablet (20 mEq) by mouth daily     Dispense:  90 tablet     Refill:  0       There are no discontinued medications.    Encounter Diagnosis   Name Primary?     Aortic valve stenosis, etiology of cardiac valve disease unspecified        CURRENT MEDICATIONS:  Current Outpatient Medications   Medication Sig Dispense Refill     acetaminophen (TYLENOL) 325 MG tablet Take 325-650 mg by mouth every 6 hours as needed for mild pain       apixaban ANTICOAGULANT (ELIQUIS) 5 MG tablet Take 1 tablet (5 mg) by mouth 2 times daily 180 tablet 3     ASPIRIN PO Take 81 mg by mouth every evening        atorvastatin (LIPITOR) 40 MG tablet Take 1 tablet (40 mg) by mouth daily 90 tablet 3     Cyanocobalamin 2000 MCG TABS Take  2,000 mcg by mouth every 7 days On Sundays       furosemide (LASIX) 20 MG tablet Take 1 tablet (20 mg) by mouth daily 60 tablet 1     gabapentin (NEURONTIN) 300 MG capsule Take 2 capsules (600 mg) by mouth every evening 60 capsule 5     loperamide (IMODIUM) 2 MG capsule Take 2 mg by mouth 4 times daily as needed for diarrhea       melatonin 1 MG TABS tablet Take 1-3 mg by mouth nightly as needed for sleep       metoprolol succinate ER (TOPROL-XL) 25 MG 24 hr tablet Take 0.5 tablets (12.5 mg) by mouth daily 45 tablet 3     multivitamin (OCUVITE) TABS Take 1 tablet by mouth 2 times daily        potassium chloride ER (K-TAB) 20 MEQ CR tablet Take 1 tablet (20 mEq) by mouth daily 90 tablet 0     calcium carbonate 600 mg-vitamin D 400 units (CALTRATE) 600-400 MG-UNIT per tablet Take 1 tablet by mouth 2 times daily       polyethylene glycol (MIRALAX/GLYCOLAX) powder Take 1 capful by mouth daily as needed for constipation       senna-docusate (SENOKOT-S;PERICOLACE) 8.6-50 MG per tablet Take 2 tablets by mouth 2 times daily (Patient not taking: Reported on 11/21/2019) 120 tablet 3       ALLERGIES     Allergies   Allergen Reactions     Lasix [Furosemide] Other (See Comments) and Swelling     Throat swelling, wheezing     Sulfa Drugs Difficulty breathing     Adhesive Tape Blisters     Amiodarone Other (See Comments)     Developed pleural effusion     Penicillins Unknown     Reaction occurred as a child       PAST MEDICAL HISTORY:  Past Medical History:   Diagnosis Date     Atrial fibrillation (H)      Benign essential hypertension 11/20/2018     Cancer (H) vocal cord     Carpal tunnel syndrome     abstracted 7/3/02.     CVA (cerebral infarction) 5/5/2015     Demyelinating disease of central nervous system, unspecified (H)     abstracted 7/3/02.     Dyspnea      ENCEPHALOPATHY UNSPECIFIED  3/15/2005    acute diseminated encephalitis     Mitral valve problem 8/18/2013    TRANSTHORACIC ECHOCARDIOGRAM 08/2013 There is a linear  strand like projection seen in the LV cavity in diastole that I suspect is the posterior mitral leaflet although I cannot exclude a torn chordae or small vegetation       Mixed hyperlipidemia 3/15/2005     Other and unspecified noninfectious gastroenteritis and colitis(558.9)     abstracted 7/3/02.     Pneumonia 8/17/2016     PVD (peripheral vascular disease) (H)      Redundant colon     needs CT colonography     S/P coronary angiogram 09/05/2017     Shingles      SKIN DISORDERS NEC 3/15/2005     Sleep apnea        PAST SURGICAL HISTORY:  Past Surgical History:   Procedure Laterality Date     BIOPSY  brain 2002     BONE MARROW BIOPSY, BONE SPECIMEN, NEEDLE/TROCAR N/A 6/8/2017    Procedure: BIOPSY BONE MARROW;  UNILATERAL BONE MARROW BIOPSY (CONSCIOUS SEDATION) ;  Surgeon: Jamie Gonzales MD;  Location:  GI     C NONSPECIFIC PROCEDURE  as a child    T & A. abstracted 7/3/02.     C NONSPECIFIC PROCEDURE  early    CTR. abstracted 7/3/02.     CARDIAC CATHERIZATION  09/05/2017    2V CAD, IFR of RCA 0.95     ESOPHAGOSCOPY, GASTROSCOPY, DUODENOSCOPY (EGD), COMBINED N/A 9/8/2018    Procedure: COMBINED ESOPHAGOSCOPY, GASTROSCOPY, DUODENOSCOPY (EGD), BIOPSY SINGLE OR MULTIPLE;;  Surgeon: Xander Marie MD;  Location:  GI     HEAD & NECK SURGERY       ORTHOPEDIC SURGERY      right arm ulna reset after injury     THORACOSCOPIC WEDGE RESECTION LUNG Right 8/2/2016    Procedure: THORACOSCOPIC WEDGE RESECTION LUNG;  Surgeon: Abdelrahman Noriega MD;  Location:  OR       FAMILY HISTORY:  Family History   Problem Relation Age of Onset     Blood Disease Mother         Anemia     Cardiovascular Father      Cancer - colorectal Maternal Grandfather      Hypertension Brother      Diabetes Sister 5        Juvinile Diabetes passed at 36     Respiratory Other         Lung Cancer       SOCIAL HISTORY:  Social History     Socioeconomic History     Marital status:      Spouse name: None     Number of children: None      Years of education: None     Highest education level: None   Occupational History     None   Social Needs     Financial resource strain: None     Food insecurity:     Worry: None     Inability: None     Transportation needs:     Medical: None     Non-medical: None   Tobacco Use     Smoking status: Former Smoker     Packs/day: 1.00     Years: 30.00     Pack years: 30.00     Types: Cigarettes     Last attempt to quit: 2013     Years since quittin.3     Smokeless tobacco: Never Used   Substance and Sexual Activity     Alcohol use: Yes     Alcohol/week: 42.0 standard drinks     Types: 42 Standard drinks or equivalent per week     Comment: occ     Drug use: No     Sexual activity: Not Currently   Lifestyle     Physical activity:     Days per week: None     Minutes per session: None     Stress: None   Relationships     Social connections:     Talks on phone: None     Gets together: None     Attends Mu-ism service: None     Active member of club or organization: None     Attends meetings of clubs or organizations: None     Relationship status: None     Intimate partner violence:     Fear of current or ex partner: None     Emotionally abused: None     Physically abused: None     Forced sexual activity: None   Other Topics Concern     Parent/sibling w/ CABG, MI or angioplasty before 65F 55M? Not Asked      Service Not Asked     Blood Transfusions Not Asked     Caffeine Concern Yes     Comment: 1 Coke occasionally      Occupational Exposure Not Asked     Hobby Hazards Not Asked     Sleep Concern Not Asked     Stress Concern Not Asked     Weight Concern Not Asked     Special Diet No     Back Care Not Asked     Exercise No     Bike Helmet Not Asked     Seat Belt Not Asked     Self-Exams Not Asked   Social History Narrative    Retired (disability)        Review of Systems:  Skin:  Negative       Eyes:  Positive for glasses    ENT:  Negative      Respiratory:  Positive for dyspnea on  "exertion;sleep apnea;CPAP     Cardiovascular:    Positive for;dizziness;fatigue;chest pain;edema    Gastroenterology: Positive for diarrhea    Genitourinary:  Negative      Musculoskeletal:  Positive for joint pain;arthritis;muscular weakness;back pain    Neurologic:  Positive for stroke;incoordination;memory problems    Psychiatric:  Positive for sleep disturbances;excessive stress;anxiety;depression    Heme/Lymph/Imm:  Positive for allergies Vocal cord CA in 2013.  Endocrine:  Negative        Physical Exam:  Vitals: BP (!) 140/79 (BP Location: Right arm, Patient Position: Sitting, Cuff Size: Adult Regular)   Pulse 65   Ht 1.727 m (5' 8\")   Wt 74.8 kg (164 lb 12.8 oz)   SpO2 96%   BMI 25.06 kg/m     Constitutional: awake, alert, no distress  Skin: Warm and dry to touch  Head: Normocephalic, atraumatic  Eyes: Conjunctivae and lids unremarkable, sclera white  Respiratory: Normal breath sounds, clear to auscultation  Cardiac: Regular rate and rhythm, S1 normal, soft S2, delayed carotid upstrokes, 2/6 late peaking systolic murmur  heard best at the right upper sternal border.  1-2+ b/l pedal edema.   Extremities and musculoskeletal: No gross motor deficit, Poor pulses on the right lower extremity.  Neurological.  Affect normal  Psych: Alert and oriented x3    Recent Lab Results:  LIPID RESULTS:  Lab Results   Component Value Date    CHOL 118 06/24/2019    HDL 51 06/24/2019    LDL 51 06/24/2019    TRIG 80 06/24/2019    CHOLHDLRATIO 2.7 09/04/2015       LIVER ENZYME RESULTS:  Lab Results   Component Value Date    AST 18 07/02/2019    ALT 16 07/02/2019       CBC RESULTS:  Lab Results   Component Value Date    WBC 12.5 (H) 07/02/2019    RBC 3.78 (L) 07/02/2019    HGB 12.4 (L) 07/02/2019    HCT 35.9 (L) 07/02/2019    MCV 95 07/02/2019    MCH 32.8 07/02/2019    MCHC 34.5 07/02/2019    RDW 14.0 07/02/2019     07/02/2019       BMP RESULTS:  Lab Results   Component Value Date     07/02/2019    POTASSIUM 3.6 " 07/10/2019    CHLORIDE 107 07/02/2019    CO2 30 07/02/2019    ANIONGAP 4 07/02/2019    GLC 82 07/02/2019    BUN 17 07/02/2019    CR 0.83 07/02/2019    GFRESTIMATED 85 07/02/2019    GFRESTBLACK >90 07/02/2019    ULISSES 8.9 07/02/2019        A1C RESULTS:  Lab Results   Component Value Date    A1C 5.3 09/04/2015       INR RESULTS:  Lab Results   Component Value Date    INR 1.08 10/22/2018    INR 1.02 09/05/2017       CC  FABI Griffith CNP  [address] [place] [address]    All medical records were reviewed in detail and discussed with the patient. Greater than 35 mins were spent with the patient, 50% of this time was spent on counseling and coordination of care.  After visit summary was printed and given to the patient.

## 2019-11-21 NOTE — LETTER
11/21/2019    Danny Paige MD, MD  [address]  [facility] [address]    RE: Efrem Ramos       Dear Colleague,    I had the pleasure of seeing Efrem Ramos in the [facility].    HPI and Plan:     Today I had the pleasure of seeing Efrem Ramos at [facility] in [place]. He is a pleasant 73 year old patient with a past medical history of left PCA stroke in 2013, paroxsymal atrial fibrillation/flutter currently on Eliquis, acute disseminated encephalomyelitis in 2003, obstructive sleep apnea, hypertension, hyperlipidemia, colitis, peripheral vascular disease involving rt superficial femoral artery not amenable to PCI, severe aortic stenosis, moderate-severe tricuspid regurgitation and mild MS/MR who presents to the clinic for routine follow-up.  The patient was previously seen by Dr. Chavis and Ms. Marya Ford.     He had an echocardiogram prior to the visit with me in 07/2019.  Echocardiogram showed progression of aortic stenosis to severe from mild to moderate previously.  The  aortic valve area  was noted to be 0.9 cm  with a mean gradient of 17 mmHg and SVI of 32 mL/m   indicating low flow low gradient severe aortic stenosis.  I ordered a low-dose dobutamine stress echocardiogram to further evaluate low flow, low gradient AS.  During that study the gradient increased from 20 mmHg at rest to 30 mmHg at peak infusion of 15 mcg.  The test was stopped due to development of rapid atrial fibrillation.  I also ordered a transesophageal echocardiogram which showed severe low flow low gradient aortic stenosis with a mean gradient of 18 mmHg, mild mitral stenosis with moderate mitral regurgitation, moderate to severe tricuspid regurgitation and moderate to severe biatrial enlargement. During my last appointment with him I referred him to to the TAVR clinic.  He was seen by Dr. Bonilla who thought he was intermediate risk for surgical  AVR due to multiple co morbidities, even though his STS score was 2.5%.  He wanted to discuss the echocardiograms during a valve conference to help assist decision making.    Previous work-up has included coronary angiogram in 07/2016 which showed 50% disease in the mid RCA and mid LAD.  IFR was negative and no intervention was performed.  Right heart cath at that time showed pulmonary pressure of 40/18 with a mean of 23, PCWP of 12, RV 35/6, RA 5.     Today, the patient reports being short of breath.  Today he walked from the front door to the elevator which is a 50 feet distance and became significantly E[place] short of breath.  He today denies dizziness although he has reported that in the past.  He has also noticed lower extremities swelling for which he has tried compression stockings in the past but not using them due to discomfort.        Assessment and plan    1.  Severe aortic stenosis (paradoxical low flow low gradient)-  There was an increase in gradient during the stress echocardiogram to 30 mmHg at peak infusion of 15 mcg of dobutamine at a stroke volume index of 41 mL/m2.  The test had to be stopped because of development of atrial fibrillation.  I referred him to TAVR clinic but It was thought that his stenosis was not severe enough to require TAVR yet. Another issue is that he also has mod to severe TR which may be causing his s/s.  He may even be a candidate for TV ring along with surgical AVR. However, will reassess in a few months with repeat echo after optimization of his fluid status.       2.  Paroxysmal atrial fibrillation with high chads vas score  The patient is currently on Eliquis for anticoagulation for primary prevention.  I will continue him on AC and also on metoprolol 25 mg p.o. twice daily.    3.  Moderate, nonobstructive two-vessel coronary artery disease  He had disease of the mid RCA and LAD both of which were negative by IFR.  He is currently on Lipitor and metoprolol and I will  continue both of these.    4.  Hypertension  Well controlled.    5.  Hyperlipidemia  Blood work from today showed LDL of 51 mg/dL.  ALT is normal    6.  Moderate to severe tricuspid regurgitation, mild mitral regurgitation and mitral stenosis  The patient looks volume overloaded today.  I will start him on 20 mg of Lasix daily.  Will uptitrate as tolerated.  We will also start him on 20 mEq of potassium given hypokalemia.  I will have him come back and see our nurse practitioner in 1 month.  I would like him to come back sooner but they are hesitant to come back any sooner than this.  If he is found to have improvement in his lower extremity edema and symptoms he can continue Lasix and regimen the current dose.  Otherwise, he can uptitrate Lasix to 40 mg daily.  Alternatively, if the potassium is low we can start him on low-dose of spironolactone instead and keep Lasix at 20 mg. I also told him to check his weight daily and use Ace wraps on his legs.    Thank you for allowing me to participate in the care of Efrem Xiong MD  Cardiology    Orders Placed This Encounter   Procedures     Basic metabolic panel     Follow-Up with Cardiac Advanced Practice Provider     Follow-Up with Cardiologist       Orders Placed This Encounter   Medications     furosemide (LASIX) 20 MG tablet     Sig: Take 1 tablet (20 mg) by mouth daily     Dispense:  60 tablet     Refill:  1     potassium chloride ER (K-TAB) 20 MEQ CR tablet     Sig: Take 1 tablet (20 mEq) by mouth daily     Dispense:  90 tablet     Refill:  0       There are no discontinued medications.    Encounter Diagnosis   Name Primary?     Aortic valve stenosis, etiology of cardiac valve disease unspecified        CURRENT MEDICATIONS:  Current Outpatient Medications   Medication Sig Dispense Refill     acetaminophen (TYLENOL) 325 MG tablet Take 325-650 mg by mouth every 6 hours as needed for mild pain       apixaban ANTICOAGULANT (ELIQUIS) 5 MG tablet Take  1 tablet (5 mg) by mouth 2 times daily 180 tablet 3     ASPIRIN PO Take 81 mg by mouth every evening        atorvastatin (LIPITOR) 40 MG tablet Take 1 tablet (40 mg) by mouth daily 90 tablet 3     Cyanocobalamin 2000 MCG TABS Take 2,000 mcg by mouth every 7 days On Sundays       furosemide (LASIX) 20 MG tablet Take 1 tablet (20 mg) by mouth daily 60 tablet 1     gabapentin (NEURONTIN) 300 MG capsule Take 2 capsules (600 mg) by mouth every evening 60 capsule 5     loperamide (IMODIUM) 2 MG capsule Take 2 mg by mouth 4 times daily as needed for diarrhea       melatonin 1 MG TABS tablet Take 1-3 mg by mouth nightly as needed for sleep       metoprolol succinate ER (TOPROL-XL) 25 MG 24 hr tablet Take 0.5 tablets (12.5 mg) by mouth daily 45 tablet 3     multivitamin (OCUVITE) TABS Take 1 tablet by mouth 2 times daily        potassium chloride ER (K-TAB) 20 MEQ CR tablet Take 1 tablet (20 mEq) by mouth daily 90 tablet 0     calcium carbonate 600 mg-vitamin D 400 units (CALTRATE) 600-400 MG-UNIT per tablet Take 1 tablet by mouth 2 times daily       polyethylene glycol (MIRALAX/GLYCOLAX) powder Take 1 capful by mouth daily as needed for constipation       senna-docusate (SENOKOT-S;PERICOLACE) 8.6-50 MG per tablet Take 2 tablets by mouth 2 times daily (Patient not taking: Reported on 11/21/2019) 120 tablet 3       ALLERGIES     Allergies   Allergen Reactions     Lasix [Furosemide] Other (See Comments) and Swelling     Throat swelling, wheezing     Sulfa Drugs Difficulty breathing     Adhesive Tape Blisters     Amiodarone Other (See Comments)     Developed pleural effusion     Penicillins Unknown     Reaction occurred as a child       PAST MEDICAL HISTORY:  Past Medical History:   Diagnosis Date     Atrial fibrillation (H)      Benign essential hypertension 11/20/2018     Cancer (H) vocal cord     Carpal tunnel syndrome     abstracted 7/3/02.     CVA (cerebral infarction) 5/5/2015     Demyelinating disease of central nervous  system, unspecified (H)     abstracted 7/3/02.     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     Pneumonia 8/17/2016     PVD (peripheral vascular disease) (H)      Redundant colon     needs CT colonography     S/P coronary angiogram 09/05/2017     Shingles      SKIN DISORDERS NEC 3/15/2005     Sleep apnea        PAST SURGICAL HISTORY:  Past Surgical History:   Procedure Laterality Date     BIOPSY  brain 2002     BONE MARROW BIOPSY, BONE SPECIMEN, NEEDLE/TROCAR N/A 6/8/2017    Procedure: BIOPSY BONE MARROW;  UNILATERAL BONE MARROW BIOPSY (CONSCIOUS SEDATION) ;  Surgeon: Jamie Gonzales MD;  Location:  GI     C NONSPECIFIC PROCEDURE  as a child    T & A. abstracted 7/3/02.     C NONSPECIFIC PROCEDURE  early    CTR. abstracted 7/3/02.     CARDIAC CATHERIZATION  09/05/2017    2V CAD, IFR of RCA 0.95     ESOPHAGOSCOPY, GASTROSCOPY, DUODENOSCOPY (EGD), COMBINED N/A 9/8/2018    Procedure: COMBINED ESOPHAGOSCOPY, GASTROSCOPY, DUODENOSCOPY (EGD), BIOPSY SINGLE OR MULTIPLE;;  Surgeon: Xander Marie MD;  Location:  GI     HEAD & NECK SURGERY       ORTHOPEDIC SURGERY      right arm ulna reset after injury     THORACOSCOPIC WEDGE RESECTION LUNG Right 8/2/2016    Procedure: THORACOSCOPIC WEDGE RESECTION LUNG;  Surgeon: Abdelrahman Noriega MD;  Location:  OR       FAMILY HISTORY:  Family History   Problem Relation Age of Onset     Blood Disease Mother         Anemia     Cardiovascular Father      Cancer - colorectal Maternal Grandfather      Hypertension Brother      Diabetes  Sister 5        Constance Diabetes passed at 36     Respiratory Other         Lung Cancer       SOCIAL HISTORY:  Social History     Socioeconomic History     Marital status:      Spouse name: None     Number of children: None     Years of education: None     Highest education level: None   Occupational History     None   Social Needs     Financial resource strain: None     Food insecurity:     Worry: None     Inability: None     Transportation needs:     Medical: None     Non-medical: None   Tobacco Use     Smoking status: Former Smoker     Packs/day: 1.00     Years: 30.00     Pack years: 30.00     Types: Cigarettes     Last attempt to quit: 2013     Years since quittin.3     Smokeless tobacco: Never Used   Substance and Sexual Activity     Alcohol use: Yes     Alcohol/week: 42.0 standard drinks     Types: 42 Standard drinks or equivalent per week     Comment: occ     Drug use: No     Sexual activity: Not Currently   Lifestyle     Physical activity:     Days per week: None     Minutes per session: None     Stress: None   Relationships     Social connections:     Talks on phone: None     Gets together: None     Attends Orthodox service: None     Active member of club or organization: None     Attends meetings of clubs or organizations: None     Relationship status: None     Intimate partner violence:     Fear of current or ex partner: None     Emotionally abused: None     Physically abused: None     Forced sexual activity: None   Other Topics Concern     Parent/sibling w/ CABG, MI or angioplasty before 65F 55M? Not Asked      Service Not Asked     Blood Transfusions Not Asked     Caffeine Concern Yes     Comment: 1 Coke occasionally      Occupational Exposure Not Asked     Hobby Hazards Not Asked     Sleep Concern Not Asked     Stress Concern Not Asked     Weight Concern Not Asked     Special Diet No     Back Care Not Asked     Exercise No     Bike Helmet Not Asked     Seat Belt Not Asked      "Self-Exams Not Asked   Social History Narrative    Retired (disability)        Review of Systems:  Skin:  Negative       Eyes:  Positive for glasses    ENT:  Negative      Respiratory:  Positive for dyspnea on exertion;sleep apnea;CPAP     Cardiovascular:    Positive for;dizziness;fatigue;chest pain;edema    Gastroenterology: Positive for diarrhea    Genitourinary:  Negative      Musculoskeletal:  Positive for joint pain;arthritis;muscular weakness;back pain    Neurologic:  Positive for stroke;incoordination;memory problems    Psychiatric:  Positive for sleep disturbances;excessive stress;anxiety;depression    Heme/Lymph/Imm:  Positive for allergies Vocal cord CA in 2013.  Endocrine:  Negative        Physical Exam:  Vitals: BP (!) 140/79 (BP Location: Right arm, Patient Position: Sitting, Cuff Size: Adult Regular)   Pulse 65   Ht 1.727 m (5' 8\")   Wt 74.8 kg (164 lb 12.8 oz)   SpO2 96%   BMI 25.06 kg/m      Constitutional: awake, alert, no distress  Skin: Warm and dry to touch  Head: Normocephalic, atraumatic  Eyes: Conjunctivae and lids unremarkable, sclera white  Respiratory: Normal breath sounds, clear to auscultation  Cardiac: Regular rate and rhythm, S1 normal, soft S2, delayed carotid upstrokes, 2/6 late peaking systolic murmur  heard best at the right upper sternal border.   1-2+ b/l pedal edema.   Extremities and musculoskeletal: No gross motor deficit, Poor pulses on the right lower extremity.  Neurological.  Affect normal  Psych: Alert and oriented x3    Recent Lab Results:  LIPID RESULTS:  Lab Results   Component Value Date    CHOL 118 06/24/2019    HDL 51 06/24/2019    LDL 51 06/24/2019    TRIG 80 06/24/2019    CHOLHDLRATIO 2.7 09/04/2015       LIVER ENZYME RESULTS:  Lab Results   Component Value Date    AST 18 07/02/2019    ALT 16 07/02/2019       CBC RESULTS:  Lab Results   Component Value Date    WBC 12.5 (H) 07/02/2019    RBC 3.78 (L) 07/02/2019    HGB 12.4 (L) 07/02/2019    HCT " 35.9 (L) 07/02/2019    MCV 95 07/02/2019    MCH 32.8 07/02/2019    MCHC 34.5 07/02/2019    RDW 14.0 07/02/2019     07/02/2019       BMP RESULTS:  Lab Results   Component Value Date     07/02/2019    POTASSIUM 3.6 07/10/2019    CHLORIDE 107 07/02/2019    CO2 30 07/02/2019    ANIONGAP 4 07/02/2019    GLC 82 07/02/2019    BUN 17 07/02/2019    CR 0.83 07/02/2019    GFRESTIMATED 85 07/02/2019    GFRESTBLACK >90 07/02/2019    ULISSES 8.9 07/02/2019        A1C RESULTS:  Lab Results   Component Value Date    A1C 5.3 09/04/2015       INR RESULTS:  Lab Results   Component Value Date    INR 1.08 10/22/2018    INR 1.02 09/05/2017       CC  FABI Griffith CNP  [address] [place] [address]    All medical records were reviewed in detail and discussed with the patient. Greater than 35 mins were spent with the patient, 50% of this time was spent on counseling and coordination of care.  After visit summary was printed and given to the patient.        Thank you for allowing me to participate in the care of your patient.      Sincerely,     Severo Xiong MD     [facility]    cc:   Severo Xiong MD  [address]  [place] [address]

## 2019-11-27 ENCOUNTER — TELEPHONE (OUTPATIENT)
Dept: PHARMACY | Facility: CLINIC | Age: 74
End: 2019-11-27

## 2019-11-27 NOTE — TELEPHONE ENCOUNTER
We have been unable to reach this patient for MTM follow-up after several attempts. We will stop reaching out to the patient at this time. Please let us know if we can assist in this patient's care in the future.    Routing to PCP as Pete AmesD, [facility]  Medication Therapy Management Provider  Pager: [phone]

## 2019-12-27 ENCOUNTER — OFFICE VISIT (OUTPATIENT)
Dept: CARDIOLOGY | Facility: CLINIC | Age: 74
End: 2019-12-27
Attending: INTERNAL MEDICINE
Payer: MEDICARE

## 2019-12-27 VITALS
BODY MASS INDEX: 23.95 KG/M2 | WEIGHT: 158 LBS | HEART RATE: 106 BPM | SYSTOLIC BLOOD PRESSURE: 99 MMHG | HEIGHT: 68 IN | DIASTOLIC BLOOD PRESSURE: 67 MMHG

## 2019-12-27 DIAGNOSIS — I25.119 CORONARY ARTERY DISEASE INVOLVING NATIVE CORONARY ARTERY OF NATIVE HEART WITH ANGINA PECTORIS (H): ICD-10-CM

## 2019-12-27 DIAGNOSIS — I48.91 ATRIAL FIBRILLATION AND FLUTTER (H): ICD-10-CM

## 2019-12-27 DIAGNOSIS — I35.0 AORTIC VALVE STENOSIS, ETIOLOGY OF CARDIAC VALVE DISEASE UNSPECIFIED: Primary | ICD-10-CM

## 2019-12-27 DIAGNOSIS — I10 BENIGN ESSENTIAL HYPERTENSION: ICD-10-CM

## 2019-12-27 DIAGNOSIS — R60.0 LOWER EXTREMITY EDEMA: ICD-10-CM

## 2019-12-27 DIAGNOSIS — I35.0 AORTIC VALVE STENOSIS, ETIOLOGY OF CARDIAC VALVE DISEASE UNSPECIFIED: ICD-10-CM

## 2019-12-27 DIAGNOSIS — E78.2 MIXED HYPERLIPIDEMIA: ICD-10-CM

## 2019-12-27 DIAGNOSIS — I48.92 ATRIAL FIBRILLATION AND FLUTTER (H): ICD-10-CM

## 2019-12-27 LAB
ANION GAP SERPL CALCULATED.3IONS-SCNC: 10.7 MMOL/L (ref 6–17)
BUN SERPL-MCNC: 9 MG/DL (ref 7–30)
CALCIUM SERPL-MCNC: 9.3 MG/DL (ref 8.5–10.5)
CHLORIDE SERPL-SCNC: 102 MMOL/L (ref 98–107)
CO2 SERPL-SCNC: 32 MMOL/L (ref 23–29)
CREAT SERPL-MCNC: 1.16 MG/DL (ref 0.7–1.3)
GFR SERPL CREATININE-BSD FRML MDRD: 61 ML/MIN/{1.73_M2}
GLUCOSE SERPL-MCNC: 99 MG/DL (ref 70–105)
POTASSIUM SERPL-SCNC: 3.7 MMOL/L (ref 3.5–5.1)
SODIUM SERPL-SCNC: 141 MMOL/L (ref 136–145)

## 2019-12-27 PROCEDURE — 99214 OFFICE O/P EST MOD 30 MIN: CPT | Performed by: NURSE PRACTITIONER

## 2019-12-27 PROCEDURE — 80048 BASIC METABOLIC PNL TOTAL CA: CPT | Performed by: INTERNAL MEDICINE

## 2019-12-27 PROCEDURE — 36415 COLL VENOUS BLD VENIPUNCTURE: CPT | Performed by: INTERNAL MEDICINE

## 2019-12-27 PROCEDURE — 93000 ELECTROCARDIOGRAM COMPLETE: CPT | Performed by: NURSE PRACTITIONER

## 2019-12-27 ASSESSMENT — MIFFLIN-ST. JEOR: SCORE: 1421.18

## 2019-12-27 NOTE — PATIENT INSTRUCTIONS
Thanks for coming into [facility] today.    Continues with shortness of breath at rest and with exertion.   Tolerating Lasix 20 mg daily, down ~ 6 lbs over the last month.  Improved lower extremity edema  BMP today, stable results.   Follow up echo in February, structural heart team to review.   Continue on all medications.         Please call my nurse at [phone] with any questions or concerns.    Scheduling phone number: [phone]  Reminder: Please bring in all current medications, over the counter supplements and vitamin bottles to your next appointment.

## 2019-12-27 NOTE — PROGRESS NOTES
Cardiology Clinic Progress Note  Efrem Ramos MRN# [id number]   YOB: 1945 Age: 73 year old     Reason For Visit:  1 month follow up    Primary cardiologist -Dr. Xiong          History of Presenting Illness:    Efrem Ramos is a pleasant 73 year old patient who carries a past medical history significant for stroke with visual changes, atrial fibrillation/flutter, aortic stenosis, acute disseminated  encephalomyelitis, coronary artery disease, sleep apnea, hypertension, hyperlipidemia, colitis, COPD, PVD and MGUS.      He was last seen on 11/21/2019 for further evaluation of his aortic stenosis.  His last echocardiogram showed a progression of his aortic stenosis with aortic valve area 0.9 cm  with a mean gradient of 17 mmHg and SVI of 32 mls/m2, indicating low flow low gradient severe aortic stenosis.  A follow-up transesophageal echocardiogram showed severe low-flow low gradient with a mean gradient of 18 mmHg, mild mitral stenosis with moderate MR, moderate to severe tricuspid regurgitation and moderate to severe biatrial enlargement.  The studies were reviewed by Dr. Cortes and the structural heart team and felt his aortic valve looked restricted and calcified but measurements showed moderate to severe aortic stenosis.  He recommended repeat echocardiogram in 6 months to reassess the aortic valve.  He was initiated on 20 mg of Lasix daily for symptom improvement of his exertional shortness of breath and lower extremity edema.  He returns to the office today accompanied by his wife for clinical evaluation.    He reports improvement in his lower extremity edema and noted to be down approximately 6 pounds since the initiation of furosemide.  However, he has no improvement in his shortness of breath and continues with positional lightheadedness.  He denies chest pain, orthopnea, PND, palpitations, presyncope, or syncope.    Blood pressure is borderline low at 99/67  Atrial fibrillation is rate  controlled on metoprolol, anticoagulated with Eliquis. He denies any evidence of bleeding.    Activity is primarily sedentary.  He does walk with the assistance of a walker.  Eating and sleeping well  He remains compliant with all medications.                   Assessment and Plan:     1. Atrial fibrillation - managed on metoprolol and Eliquis  2. Aortic Stenosis - . Last echocardiogram showed a progression of his aortic stenosis with aortic valve area 0.9 cm  with a mean gradient of 17 mmHg and SVI of 32 mls/m2, indicating low flow low gradient severe aortic stenosis.  A follow-up EMILIANO showed severe low-flow low gradient with a mean gradient of 18 mmHg, mild mitral stenosis with moderate MR, moderate to severe tricuspid regurgitation and moderate to severe biatrial enlargement.  The studies were reviewed by Dr. Cortes and TAVR team and felt his aortic valve looked restricted and calcified but measurements showed moderate to severe aortic stenosis. He recommended repeat echocardiogram in 6 months to reassess the aortic valve.   3. CAD - no symptoms of ischemia. Last angiogram confirms moderate non obstructive disease. Continue with aspirin, metoprolol, and statin. Encourage activity.   4. Hypertension -borderline low 99/67.  Continue with current therapy, monitor for lightheadedness or dizziness.  5.          LE edema -significantly improved after initiating Lasix.  Encourage compression stockings.  6.         Hyperlipidemia - LDL at goal, continue on current dose of statin. Encourage heart healthy diet.                Thank you for allowing me to participate in this delightful patient's care.  He is scheduled for a follow-up echocardiogram in February with follow-up after in structural heart clinic.       Marya Vela, APRN, CNP          Review of Systems:   Review of Systems:  Skin:  Negative     Eyes:  Positive for glasses  ENT:  Negative    Respiratory:  Positive for dyspnea on exertion;sleep  apnea;CPAP  Cardiovascular:    Positive for;dizziness;fatigue;chest pain;edema  Gastroenterology: Positive for diarrhea  Genitourinary:  Negative    Musculoskeletal:  Positive for joint pain;arthritis;muscular weakness;back pain  Neurologic:  Positive for stroke;incoordination;memory problems;headaches  Psychiatric:  Positive for sleep disturbances;excessive stress;anxiety;depression  Heme/Lymph/Imm:  Positive for allergies  Endocrine:  Negative                Physical Exam:     GEN:  In general, this is a frail elderly male in no acute distress. Patient ambulatory with cane/walker  HEENT:  Pupils equal, round. Sclerae nonicteric. Clear oropharynx. Mucous membranes moist.  NECK: Supple, no masses appreciated. Trachea midline. No JVD   C/V:  Regular rate and irregular rhythm, systolic  murmur, rub or gallop.   RESP: Respirations are unlabored. No use of accessory muscles. Clear to auscultation bilaterally without wheezing, rales, or rhonchi.  GI: Abdomen soft, nontender, nondistended. No HSM appreciated.   EXTREM: Trace bilateral LE edema.   NEURO: Alert and oriented, cooperative. Gait stable with assistance device. Extremity weakness  PSYCH: Normal affect.  SKIN: Warm and dry. No rashes or petechiae appreciated.          Past Medical History:     Past Medical History:   Diagnosis Date     Atrial fibrillation (H)      Benign essential hypertension 11/20/2018     Cancer (H) vocal cord     Carpal tunnel syndrome     abstracted 7/3/02.     CVA (cerebral infarction) 5/5/2015     Demyelinating disease of central nervous system, unspecified (H)     abstracted 7/3/02.     Dyspnea      ENCEPHALOPATHY UNSPECIFIED  3/15/2005    acute diseminated encephalitis     Mitral valve problem 8/18/2013    TRANSTHORACIC ECHOCARDIOGRAM 08/2013 There is a linear strand like projection seen in the LV cavity in diastole that I suspect is the posterior mitral leaflet although I cannot exclude a torn chordae or small vegetation       Mixed  hyperlipidemia 3/15/2005     Other and unspecified noninfectious gastroenteritis and colitis(558.9)     abstracted 7/3/02.     Pneumonia 8/17/2016     PVD (peripheral vascular disease) (H)      Redundant colon     needs CT colonography     S/P coronary angiogram 09/05/2017     Shingles      SKIN DISORDERS NEC 3/15/2005     Sleep apnea               Past Surgical History:     Past Surgical History:   Procedure Laterality Date     BIOPSY  brain 2002     BONE MARROW BIOPSY, BONE SPECIMEN, NEEDLE/TROCAR N/A 6/8/2017    Procedure: BIOPSY BONE MARROW;  UNILATERAL BONE MARROW BIOPSY (CONSCIOUS SEDATION) ;  Surgeon: Jamie Gonzales MD;  Location:  GI     C NONSPECIFIC PROCEDURE  as a child    T & A. abstracted 7/3/02.     C NONSPECIFIC PROCEDURE  early    CTR. abstracted 7/3/02.     CARDIAC CATHERIZATION  09/05/2017    2V CAD, IFR of RCA 0.95     ESOPHAGOSCOPY, GASTROSCOPY, DUODENOSCOPY (EGD), COMBINED N/A 9/8/2018    Procedure: COMBINED ESOPHAGOSCOPY, GASTROSCOPY, DUODENOSCOPY (EGD), BIOPSY SINGLE OR MULTIPLE;;  Surgeon: Xander Marie MD;  Location:  GI     HEAD & NECK SURGERY       ORTHOPEDIC SURGERY      right arm ulna reset after injury     THORACOSCOPIC WEDGE RESECTION LUNG Right 8/2/2016    Procedure: THORACOSCOPIC WEDGE RESECTION LUNG;  Surgeon: Abdelrahman Noriega MD;  Location:  OR              Allergies:   Lasix [furosemide]; Sulfa drugs; Adhesive tape; Amiodarone; and Penicillins       Data:   All laboratory data reviewed:    LAST CHOLESTEROL:  Lab Results   Component Value Date    CHOL 146 05/30/2018     Lab Results   Component Value Date    HDL 61 05/30/2018     Lab Results   Component Value Date    LDL 67 05/30/2018     Lab Results   Component Value Date    TRIG 90 05/30/2018     Lab Results   Component Value Date    CHOLHDLRATIO 2.7 09/04/2015       LAST BMP:  Lab Results   Component Value Date     10/01/2018      Lab Results   Component Value Date    POTASSIUM 4.6 10/01/2018      Lab Results   Component Value Date    CHLORIDE 104 10/01/2018     Lab Results   Component Value Date    ULISSES 8.9 10/01/2018     Lab Results   Component Value Date    CO2 25 10/01/2018     Lab Results   Component Value Date    BUN 7 10/01/2018     Lab Results   Component Value Date    CR 0.94 10/01/2018     Lab Results   Component Value Date    GLC 89 10/01/2018       LAST CBC:  Lab Results   Component Value Date    WBC 13.7 09/19/2018     Lab Results   Component Value Date    RBC 3.72 09/19/2018     Lab Results   Component Value Date    HGB 13.5 10/22/2018     Lab Results   Component Value Date    HCT 36.8 09/19/2018     Lab Results   Component Value Date    MCV 99 09/19/2018     Lab Results   Component Value Date    MCH 32.0 09/19/2018     Lab Results   Component Value Date    MCHC 32.3 09/19/2018     Lab Results   Component Value Date    RDW 15.3 09/19/2018     Lab Results   Component Value Date     10/22/2018

## 2019-12-27 NOTE — LETTER
12/27/2019    Danny Paige MD, MD  [address]  [facility] [address]    RE: Efrem Ramos       Dear Colleague,    I had the pleasure of seeing Efrem Ramos in the [facility].    Cardiology Clinic Progress Note  Efrem Ramos MRN# [id number]   YOB: 1945 Age: 73 year old     Reason For Visit:  1 month follow up    Primary cardiologist -Dr. Xiong          History of Presenting Illness:    Efrem Ramos is a pleasant 73 year old patient who carries a past medical history significant for stroke with visual changes, atrial fibrillation/flutter, aortic stenosis, acute disseminated  encephalomyelitis, coronary artery disease, sleep apnea, hypertension, hyperlipidemia, colitis, COPD, PVD and MGUS.      He was last seen on 11/21/2019 for further evaluation of his aortic stenosis.  His last echocardiogram showed a progression of his aortic stenosis with aortic valve area 0.9 cm  with a mean gradient of 17 mmHg and SVI of 32 mls/m2, indicating low flow low gradient severe aortic stenosis.  A follow-up transesophageal echocardiogram showed severe low-flow low gradient with a mean gradient of 18 mmHg, mild mitral stenosis with moderate MR, moderate to severe tricuspid regurgitation and moderate to severe biatrial enlargement.  The studies were reviewed by Dr. Cortes and the structural heart team and felt his aortic valve looked restricted and calcified but measurements showed moderate to severe aortic stenosis.  He recommended repeat echocardiogram in 6 months to reassess the aortic valve.  He was initiated on 20 mg of Lasix daily for symptom improvement of his exertional shortness of breath and lower extremity edema.  He returns to the office today accompanied by his wife for clinical evaluation.    He reports improvement in his lower extremity edema and noted to be down approximately 6 pounds since the initiation of furosemide.  However, he has no  improvement in his shortness of breath and continues with positional lightheadedness.  He denies chest pain, orthopnea, PND, palpitations, presyncope, or syncope.    Blood pressure is borderline low at 99/67  Atrial fibrillation is rate controlled on metoprolol, anticoagulated with Eliquis. He denies any evidence of bleeding.    Activity is primarily sedentary.  He does walk with the assistance of a walker.  Eating and sleeping well  He remains compliant with all medications.                   Assessment and Plan:     1. Atrial fibrillation - managed on metoprolol and Eliquis  2. Aortic Stenosis - . Last echocardiogram showed a progression of his aortic stenosis with aortic valve area 0.9 cm  with a mean gradient of 17 mmHg and SVI of 32 mls/m2, indicating low flow low gradient severe aortic stenosis.  A follow-up EMILIANO showed severe low-flow low gradient with a mean gradient of 18 mmHg, mild mitral stenosis with moderate MR, moderate to severe tricuspid regurgitation and moderate to severe biatrial enlargement.  The studies were reviewed by Dr. Cortes and TAVR team and felt his aortic valve looked restricted and calcified but measurements showed moderate to severe aortic stenosis. He recommended repeat echocardiogram in 6 months to reassess the aortic valve.   3. CAD - no symptoms of ischemia. Last angiogram confirms moderate non obstructive disease. Continue with aspirin, metoprolol, and statin. Encourage activity.   4. Hypertension -borderline low 99/67.  Continue with current therapy, monitor for lightheadedness or dizziness.  5.          LE edema -significantly improved after initiating Lasix.  Encourage compression stockings.  6.         Hyperlipidemia - LDL at goal, continue on current dose of statin. Encourage heart healthy diet.                Thank you for allowing me to participate in this delightful patient's care.  He is scheduled for a follow-up echocardiogram in February with follow-up after in  structural heart clinic.       FABI Griffith, CNP          Review of Systems:   Review of Systems:  Skin:  Negative     Eyes:  Positive for glasses  ENT:  Negative    Respiratory:  Positive for dyspnea on exertion;sleep apnea;CPAP  Cardiovascular:    Positive for;dizziness;fatigue;chest pain;edema  Gastroenterology: Positive for diarrhea  Genitourinary:  Negative    Musculoskeletal:  Positive for joint pain;arthritis;muscular weakness;back pain  Neurologic:  Positive for stroke;incoordination;memory problems;headaches  Psychiatric:  Positive for sleep disturbances;excessive stress;anxiety;depression  Heme/Lymph/Imm:  Positive for allergies  Endocrine:  Negative                Physical Exam:     GEN:  In general, this is a frail elderly male in no acute distress. Patient ambulatory with cane/walker  HEENT:  Pupils equal, round. Sclerae nonicteric. Clear oropharynx. Mucous membranes moist.  NECK: Supple, no masses appreciated. Trachea midline. No JVD   C/V:  Regular rate and irregular rhythm, systolic  murmur, rub or gallop.   RESP: Respirations are unlabored. No use of accessory muscles. Clear to auscultation bilaterally without wheezing, rales, or rhonchi.  GI: Abdomen soft, nontender, nondistended. No HSM appreciated.   EXTREM: Trace bilateral LE edema.   NEURO: Alert and oriented, cooperative. Gait stable with assistance device. Extremity weakness  PSYCH: Normal affect.  SKIN: Warm and dry. No rashes or petechiae appreciated.          Past Medical History:     Past Medical History:   Diagnosis Date     Atrial fibrillation (H)      Benign essential hypertension 11/20/2018     Cancer (H) vocal cord     Carpal tunnel syndrome     abstracted 7/3/02.     CVA (cerebral infarction) 5/5/2015     Demyelinating disease of central nervous system, unspecified (H)     abstracted 7/3/02.     Dyspnea      ENCEPHALOPATHY UNSPECIFIED  3/15/2005    acute diseminated encephalitis     Mitral valve problem 8/18/2013     TRANSTHORACIC ECHOCARDIOGRAM 08/2013 There is a linear strand like projection seen in the LV cavity in diastole that I suspect is the posterior mitral leaflet although I cannot exclude a torn chordae or small vegetation       Mixed hyperlipidemia 3/15/2005     Other and unspecified noninfectious gastroenteritis and colitis(558.9)     abstracted 7/3/02.     Pneumonia 8/17/2016     PVD (peripheral vascular disease) (H)      Redundant colon     needs CT colonography     S/P coronary angiogram 09/05/2017     Shingles      SKIN DISORDERS NEC 3/15/2005     Sleep apnea               Past Surgical History:     Past Surgical History:   Procedure Laterality Date     BIOPSY  brain 2002     BONE MARROW BIOPSY, BONE SPECIMEN, NEEDLE/TROCAR N/A 6/8/2017    Procedure: BIOPSY BONE MARROW;  UNILATERAL BONE MARROW BIOPSY (CONSCIOUS SEDATION) ;  Surgeon: Jamie Gonzales MD;  Location:  GI     C NONSPECIFIC PROCEDURE  as a child    T & A. abstracted 7/3/02.     C NONSPECIFIC PROCEDURE  early    CTR. abstracted 7/3/02.     CARDIAC CATHERIZATION  09/05/2017    2V CAD, IFR of RCA 0.95     ESOPHAGOSCOPY, GASTROSCOPY, DUODENOSCOPY (EGD), COMBINED N/A 9/8/2018    Procedure: COMBINED ESOPHAGOSCOPY, GASTROSCOPY, DUODENOSCOPY (EGD), BIOPSY SINGLE OR MULTIPLE;;  Surgeon: Xander Marie MD;  Location:  GI     HEAD & NECK SURGERY       ORTHOPEDIC SURGERY      right arm ulna reset after injury     THORACOSCOPIC WEDGE RESECTION LUNG Right 8/2/2016    Procedure: THORACOSCOPIC WEDGE RESECTION LUNG;  Surgeon: Abdelrahman Noriega MD;  Location:  OR              Allergies:   Lasix [furosemide]; Sulfa drugs; Adhesive tape; Amiodarone; and Penicillins       Data:   All laboratory data reviewed:    LAST CHOLESTEROL:  Lab Results   Component Value Date    CHOL 146 05/30/2018     Lab Results   Component Value Date    HDL 61 05/30/2018     Lab Results   Component Value Date    LDL 67 05/30/2018     Lab Results   Component Value Date     TRIG 90 05/30/2018     Lab Results   Component Value Date    CHOLHDLRATIO 2.7 09/04/2015       LAST BMP:  Lab Results   Component Value Date     10/01/2018      Lab Results   Component Value Date    POTASSIUM 4.6 10/01/2018     Lab Results   Component Value Date    CHLORIDE 104 10/01/2018     Lab Results   Component Value Date    ULISSES 8.9 10/01/2018     Lab Results   Component Value Date    CO2 25 10/01/2018     Lab Results   Component Value Date    BUN 7 10/01/2018     Lab Results   Component Value Date    CR 0.94 10/01/2018     Lab Results   Component Value Date    GLC 89 10/01/2018       LAST CBC:  Lab Results   Component Value Date    WBC 13.7 09/19/2018     Lab Results   Component Value Date    RBC 3.72 09/19/2018     Lab Results   Component Value Date    HGB 13.5 10/22/2018     Lab Results   Component Value Date    HCT 36.8 09/19/2018     Lab Results   Component Value Date    MCV 99 09/19/2018     Lab Results   Component Value Date    MCH 32.0 09/19/2018     Lab Results   Component Value Date    MCHC 32.3 09/19/2018     Lab Results   Component Value Date    RDW 15.3 09/19/2018     Lab Results   Component Value Date     10/22/2018       Thank you for allowing me to participate in the care of your patient.    Sincerely,     FABI Griffith [facility]

## 2020-01-07 DIAGNOSIS — D47.2 IGG MONOCLONAL GAMMOPATHY: Primary | ICD-10-CM

## 2020-01-09 ENCOUNTER — INFUSION THERAPY VISIT (OUTPATIENT)
Dept: INFUSION THERAPY | Facility: CLINIC | Age: 75
End: 2020-01-09
Attending: INTERNAL MEDICINE
Payer: MEDICARE

## 2020-01-09 ENCOUNTER — HOSPITAL ENCOUNTER (OUTPATIENT)
Facility: CLINIC | Age: 75
Setting detail: SPECIMEN
Discharge: HOME | End: 2020-01-09
Attending: INTERNAL MEDICINE | Admitting: INTERNAL MEDICINE
Payer: MEDICARE

## 2020-01-09 DIAGNOSIS — D47.2 IGG MONOCLONAL GAMMOPATHY: ICD-10-CM

## 2020-01-09 LAB
ALBUMIN SERPL-MCNC: 3.5 G/DL (ref 3.4–5)
ALP SERPL-CCNC: 111 U/L (ref 40–150)
ALT SERPL W P-5'-P-CCNC: 18 U/L (ref 0–70)
ANION GAP SERPL CALCULATED.3IONS-SCNC: 5 MMOL/L (ref 3–14)
AST SERPL W P-5'-P-CCNC: 23 U/L (ref 0–45)
BASOPHILS # BLD AUTO: 0.1 10E9/L (ref 0–0.2)
BASOPHILS NFR BLD AUTO: 0.9 %
BILIRUB SERPL-MCNC: 1.2 MG/DL (ref 0.2–1.3)
BUN SERPL-MCNC: 8 MG/DL (ref 7–30)
CALCIUM SERPL-MCNC: 9.3 MG/DL (ref 8.5–10.1)
CHLORIDE SERPL-SCNC: 105 MMOL/L (ref 94–109)
CO2 SERPL-SCNC: 29 MMOL/L (ref 20–32)
CREAT SERPL-MCNC: 1.02 MG/DL (ref 0.66–1.25)
DIFFERENTIAL METHOD BLD: ABNORMAL
EOSINOPHIL # BLD AUTO: 0.2 10E9/L (ref 0–0.7)
EOSINOPHIL NFR BLD AUTO: 2 %
ERYTHROCYTE [DISTWIDTH] IN BLOOD BY AUTOMATED COUNT: 14.7 % (ref 10–15)
GFR SERPL CREATININE-BSD FRML MDRD: 71 ML/MIN/{1.73_M2}
GLUCOSE SERPL-MCNC: 104 MG/DL (ref 70–99)
HCT VFR BLD AUTO: 41 % (ref 40–53)
HGB BLD-MCNC: 13.3 G/DL (ref 13.3–17.7)
IMM GRANULOCYTES # BLD: 0 10E9/L (ref 0–0.4)
IMM GRANULOCYTES NFR BLD: 0.4 %
LYMPHOCYTES # BLD AUTO: 2.4 10E9/L (ref 0.8–5.3)
LYMPHOCYTES NFR BLD AUTO: 23.1 %
MCH RBC QN AUTO: 31.1 PG (ref 26.5–33)
MCHC RBC AUTO-ENTMCNC: 32.4 G/DL (ref 31.5–36.5)
MCV RBC AUTO: 96 FL (ref 78–100)
MONOCYTES # BLD AUTO: 0.8 10E9/L (ref 0–1.3)
MONOCYTES NFR BLD AUTO: 7.4 %
NEUTROPHILS # BLD AUTO: 6.8 10E9/L (ref 1.6–8.3)
NEUTROPHILS NFR BLD AUTO: 66.2 %
NRBC # BLD AUTO: 0 10*3/UL
NRBC BLD AUTO-RTO: 0 /100
PLATELET # BLD AUTO: 316 10E9/L (ref 150–450)
POTASSIUM SERPL-SCNC: 4.4 MMOL/L (ref 3.4–5.3)
PROT SERPL-MCNC: 8.2 G/DL (ref 6.8–8.8)
RBC # BLD AUTO: 4.28 10E12/L (ref 4.4–5.9)
SODIUM SERPL-SCNC: 139 MMOL/L (ref 133–144)
VIT B12 SERPL-MCNC: 1212 PG/ML (ref 193–986)
WBC # BLD AUTO: 10.3 10E9/L (ref 4–11)

## 2020-01-09 PROCEDURE — 36415 COLL VENOUS BLD VENIPUNCTURE: CPT

## 2020-01-09 PROCEDURE — 86334 IMMUNOFIX E-PHORESIS SERUM: CPT | Performed by: INTERNAL MEDICINE

## 2020-01-09 PROCEDURE — 82784 ASSAY IGA/IGD/IGG/IGM EACH: CPT | Performed by: INTERNAL MEDICINE

## 2020-01-09 PROCEDURE — 85025 COMPLETE CBC W/AUTO DIFF WBC: CPT | Performed by: INTERNAL MEDICINE

## 2020-01-09 PROCEDURE — 00000402 ZZHCL STATISTIC TOTAL PROTEIN: Performed by: INTERNAL MEDICINE

## 2020-01-09 PROCEDURE — 84165 PROTEIN E-PHORESIS SERUM: CPT | Performed by: INTERNAL MEDICINE

## 2020-01-09 PROCEDURE — 80053 COMPREHEN METABOLIC PANEL: CPT | Performed by: INTERNAL MEDICINE

## 2020-01-09 PROCEDURE — 83883 ASSAY NEPHELOMETRY NOT SPEC: CPT | Performed by: INTERNAL MEDICINE

## 2020-01-09 PROCEDURE — 82607 VITAMIN B-12: CPT | Performed by: INTERNAL MEDICINE

## 2020-01-09 NOTE — PROGRESS NOTES
Medical Assistant Note:  Efrem Ramos presents today for blood draw.    Patient seen by provider today: No.   present during visit today: Not Applicable.    Concerns: No Concerns.    Procedure:  Lab draw site: LAC, Needle type: BF, Gauge: 23.    Post Assessment:  Labs drawn without difficulty: Yes.    Discharge Plan:  Departure Mode: Ambulatory.    Face to Face Time: 5MIN  .    Ebony Johnson, CMA

## 2020-01-10 LAB
KAPPA LC UR-MCNC: 6.79 MG/DL (ref 0.33–1.94)
KAPPA LC/LAMBDA SER: 2.63 {RATIO} (ref 0.26–1.65)
LAMBDA LC SERPL-MCNC: 2.58 MG/DL (ref 0.57–2.63)

## 2020-01-11 LAB
ALBUMIN SERPL ELPH-MCNC: 3.8 G/DL (ref 3.7–5.1)
ALPHA1 GLOB SERPL ELPH-MCNC: 0.4 G/DL (ref 0.2–0.4)
ALPHA2 GLOB SERPL ELPH-MCNC: 0.9 G/DL (ref 0.5–0.9)
B-GLOBULIN SERPL ELPH-MCNC: 0.8 G/DL (ref 0.6–1)
GAMMA GLOB SERPL ELPH-MCNC: 2.2 G/DL (ref 0.7–1.6)
IGA SERPL-MCNC: 474 MG/DL (ref 84–499)
IGG SERPL-MCNC: 1984 MG/DL (ref 610–1616)
IGM SERPL-MCNC: 247 MG/DL (ref 35–242)
M PROTEIN SERPL ELPH-MCNC: 0.8 G/DL
PROT PATTERN SERPL ELPH-IMP: ABNORMAL
PROT PATTERN SERPL IFE-IMP: ABNORMAL

## 2020-02-10 ENCOUNTER — HOSPITAL ENCOUNTER (OUTPATIENT)
Dept: CT IMAGING | Facility: CLINIC | Age: 75
End: 2020-02-10
Attending: INTERNAL MEDICINE
Payer: MEDICARE

## 2020-02-10 ENCOUNTER — HOSPITAL ENCOUNTER (OUTPATIENT)
Dept: CARDIOLOGY | Facility: CLINIC | Age: 75
Discharge: HOME | End: 2020-02-10
Attending: INTERNAL MEDICINE | Admitting: INTERNAL MEDICINE
Payer: MEDICARE

## 2020-02-10 VITALS — SYSTOLIC BLOOD PRESSURE: 90 MMHG | DIASTOLIC BLOOD PRESSURE: 60 MMHG

## 2020-02-10 DIAGNOSIS — R91.8 PULMONARY NODULES: ICD-10-CM

## 2020-02-10 DIAGNOSIS — I35.0 AORTIC VALVE STENOSIS, ETIOLOGY OF CARDIAC VALVE DISEASE UNSPECIFIED: ICD-10-CM

## 2020-02-10 PROCEDURE — 71250 CT THORAX DX C-: CPT

## 2020-02-10 PROCEDURE — 25500064 ZZH RX 255 OP 636: Performed by: INTERNAL MEDICINE

## 2020-02-10 PROCEDURE — 93306 TTE W/DOPPLER COMPLETE: CPT | Mod: 26 | Performed by: INTERNAL MEDICINE

## 2020-02-10 PROCEDURE — 40000264 ECHOCARDIOGRAM COMPLETE

## 2020-02-10 RX ADMIN — HUMAN ALBUMIN MICROSPHERES AND PERFLUTREN 9 ML: 10; .22 INJECTION, SOLUTION INTRAVENOUS at 15:45

## 2020-02-14 ENCOUNTER — PATIENT OUTREACH (OUTPATIENT)
Dept: ONCOLOGY | Facility: CLINIC | Age: 75
End: 2020-02-14

## 2020-02-14 NOTE — PROGRESS NOTES
"Per Dr. Abad's results note from Alfredo's CT on 2/10/20    \"CT looks about the same, nothing concerning. I recommend he continue annual lung screening with PCP\"     Writer discussed with Alfredo's spouse, Mariajose. She verbalized understanding and is in agreement with the plan.     Twila Henning RN, BSN, [facility]  Patient Care Coordinator  [facility]  [phone]      "

## 2020-02-19 DIAGNOSIS — I35.0 AORTIC VALVE STENOSIS, ETIOLOGY OF CARDIAC VALVE DISEASE UNSPECIFIED: ICD-10-CM

## 2020-02-19 RX ORDER — POTASSIUM CHLORIDE 1500 MG/1
20 TABLET, EXTENDED RELEASE ORAL DAILY
Qty: 90 TABLET | Refills: 3 | Status: SHIPPED | OUTPATIENT
Start: 2020-02-19 | End: 2020-07-09

## 2020-02-24 ENCOUNTER — ONCOLOGY VISIT (OUTPATIENT)
Dept: ONCOLOGY | Facility: CLINIC | Age: 75
End: 2020-02-24
Attending: INTERNAL MEDICINE
Payer: MEDICARE

## 2020-02-24 VITALS
SYSTOLIC BLOOD PRESSURE: 122 MMHG | DIASTOLIC BLOOD PRESSURE: 80 MMHG | TEMPERATURE: 97.6 F | BODY MASS INDEX: 25.01 KG/M2 | RESPIRATION RATE: 16 BRPM | WEIGHT: 165 LBS | HEART RATE: 94 BPM | HEIGHT: 68 IN | OXYGEN SATURATION: 97 %

## 2020-02-24 DIAGNOSIS — E53.8 VITAMIN B12 DEFICIENCY (NON ANEMIC): ICD-10-CM

## 2020-02-24 DIAGNOSIS — M54.50 LUMBAR SPINE PAIN: ICD-10-CM

## 2020-02-24 DIAGNOSIS — D47.2 MGUS (MONOCLONAL GAMMOPATHY OF UNKNOWN SIGNIFICANCE): Primary | ICD-10-CM

## 2020-02-24 PROCEDURE — 99215 OFFICE O/P EST HI 40 MIN: CPT | Performed by: INTERNAL MEDICINE

## 2020-02-24 PROCEDURE — G0463 HOSPITAL OUTPT CLINIC VISIT: HCPCS

## 2020-02-24 ASSESSMENT — PAIN SCALES - GENERAL: PAINLEVEL: NO PAIN (0)

## 2020-02-24 ASSESSMENT — MIFFLIN-ST. JEOR: SCORE: 1452.94

## 2020-02-24 NOTE — LETTER
2/24/2020         RE: Efrem Ramos  [address]  [facility] [address]        Dear Colleague,    Thank you for referring your patient, Efrem Ramos, to the [facility]. Please see a copy of my visit note below.    [facility]    Hematology/Oncology Established Patient Follow-up Note      Today's Date: 02/24/20    Reason for Follow-up: MGUS, IgG kappa type. Transfer of care.  Previously seen by Dr. Shae Aldana.    HISTORY OF PRESENT ILLNESS: Efrem Ramos is a 74 year old male who presents for follow-up of MGUS and vitamin B12 deficiency anemia.  He was previously followed by Dr. Alan Solomon for MGUS in 2013 when his M-spike was 0.6 g/dL.  He then transferred his care to Dr. Shae Aldana around 2015.  He underwent bone marrow biopsy on 6/08/2017 which showed normocellular marrow with 0.3% monoclonal plasma cells; 1 of 20 metaphase cells had a hyperdiploid karyotype with gains of one extra copy of chromosomes 5, 7, 9, 15, and loss of 1 copy each of chromosomes 13 and 22; FISH showed loss of chromosome 13.  For his vitamin B12 deficiency anemia, he received oral B12 2000 mcg once per week.      INTERIM HISTORY:  Efrem Ramos reports stable fatigue.  He has chronic right lower extremity weakness that he is attributed to prior blood clot.  He is on Eliquis for this.  He is also taking gabapentin for restless leg syndrome.  Otherwise, he denies any fevers, chills, night sweats, unintentional weight loss, bowel or bladder dysfunction.  He has chronic back pain that he attributes to a fall after vertebroplasty in 2018 for lumbar spine L1 compression fracture.  He never had follow-up for this after the fall.      REVIEW OF SYSTEMS:   14 point ROS was reviewed and is negative other than as noted above in HPI.       HOME MEDICATIONS:  Current Outpatient Medications   Medication Sig Dispense Refill     acetaminophen (TYLENOL) 325 MG tablet Take 325-650 mg by mouth every 6  hours as needed for mild pain       apixaban ANTICOAGULANT (ELIQUIS) 5 MG tablet Take 1 tablet (5 mg) by mouth 2 times daily 180 tablet 3     ASPIRIN PO Take 81 mg by mouth every evening        atorvastatin (LIPITOR) 40 MG tablet Take 1 tablet (40 mg) by mouth daily 90 tablet 3     calcium carbonate 600 mg-vitamin D 400 units (CALTRATE) 600-400 MG-UNIT per tablet Take 1 tablet by mouth 2 times daily       Cyanocobalamin 2000 MCG TABS Take 2,000 mcg by mouth every 7 days On Sundays       furosemide (LASIX) 20 MG tablet Take 1 tablet (20 mg) by mouth daily 60 tablet 1     gabapentin (NEURONTIN) 300 MG capsule Take 2 capsules (600 mg) by mouth every evening 60 capsule 5     loperamide (IMODIUM) 2 MG capsule Take 2 mg by mouth 4 times daily as needed for diarrhea       melatonin 1 MG TABS tablet Take 1-3 mg by mouth nightly as needed for sleep       metoprolol succinate ER (TOPROL-XL) 25 MG 24 hr tablet Take 0.5 tablets (12.5 mg) by mouth daily 45 tablet 3     multivitamin (OCUVITE) TABS Take 1 tablet by mouth 2 times daily        polyethylene glycol (MIRALAX/GLYCOLAX) powder Take 1 capful by mouth daily as needed for constipation       potassium chloride ER 20 MEQ PO CR tablet Take 1 tablet (20 mEq) by mouth daily 90 tablet 3         ALLERGIES:  Allergies   Allergen Reactions     Lasix [Furosemide] Other (See Comments) and Swelling     Throat swelling, wheezing     Sulfa Drugs Difficulty breathing     Adhesive Tape Blisters     Amiodarone Other (See Comments)     Developed pleural effusion     Penicillins Unknown     Reaction occurred as a child         PAST MEDICAL HISTORY:  Past Medical History:   Diagnosis Date     Atrial fibrillation (H)      Benign essential hypertension 11/20/2018     Cancer (H) vocal cord     Carpal tunnel syndrome     abstracted 7/3/02.     CVA (cerebral infarction) 5/5/2015     Demyelinating disease of central nervous system, unspecified (H)     abstracted 7/3/02.     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     Pneumonia 8/17/2016     PVD (peripheral vascular disease) (H)      Redundant colon     needs CT colonography     S/P coronary angiogram 09/05/2017     Shingles      SKIN DISORDERS NEC 3/15/2005     Sleep apnea          PAST SURGICAL HISTORY:  Past Surgical History:   Procedure Laterality Date     BIOPSY  brain 2002     BONE MARROW BIOPSY, BONE SPECIMEN, NEEDLE/TROCAR N/A 6/8/2017    Procedure: BIOPSY BONE MARROW;  UNILATERAL BONE MARROW BIOPSY (CONSCIOUS SEDATION) ;  Surgeon: Jamie Gonzales MD;  Location:  GI     C NONSPECIFIC PROCEDURE  as a child    T & A. abstracted 7/3/02.     C NONSPECIFIC PROCEDURE  early    CTR. abstracted 7/3/02.     CARDIAC CATHERIZATION  09/05/2017    2V CAD, IFR of RCA 0.95     ESOPHAGOSCOPY, GASTROSCOPY, DUODENOSCOPY (EGD), COMBINED N/A 9/8/2018    Procedure: COMBINED ESOPHAGOSCOPY, GASTROSCOPY, DUODENOSCOPY (EGD), BIOPSY SINGLE OR MULTIPLE;;  Surgeon: Xander Marie MD;  Location:  GI     HEAD & NECK SURGERY       ORTHOPEDIC SURGERY      right arm ulna reset after injury     THORACOSCOPIC WEDGE RESECTION LUNG Right 8/2/2016    Procedure: THORACOSCOPIC WEDGE RESECTION LUNG;  Surgeon: Abdelrahman Noriega MD;  Location:  OR         SOCIAL HISTORY:  Social History     Socioeconomic History     Marital status:      Spouse name: Not on file     Number of children: Not on file     Years of education: Not on file     Highest education level: Not on file   Occupational History     Not on file   Social  Needs     Financial resource strain: Not on file     Food insecurity:     Worry: Not on file     Inability: Not on file     Transportation needs:     Medical: Not on file     Non-medical: Not on file   Tobacco Use     Smoking status: Former Smoker     Packs/day: 1.00     Years: 30.00     Pack years: 30.00     Types: Cigarettes     Last attempt to quit: 2013     Years since quittin.5     Smokeless tobacco: Never Used   Substance and Sexual Activity     Alcohol use: Yes     Alcohol/week: 42.0 standard drinks     Types: 42 Standard drinks or equivalent per week     Comment: occ     Drug use: No     Sexual activity: Not Currently   Lifestyle     Physical activity:     Days per week: Not on file     Minutes per session: Not on file     Stress: Not on file   Relationships     Social connections:     Talks on phone: Not on file     Gets together: Not on file     Attends Islam service: Not on file     Active member of club or organization: Not on file     Attends meetings of clubs or organizations: Not on file     Relationship status: Not on file     Intimate partner violence:     Fear of current or ex partner: Not on file     Emotionally abused: Not on file     Physically abused: Not on file     Forced sexual activity: Not on file   Other Topics Concern     Parent/sibling w/ CABG, MI or angioplasty before 65F 55M? Not Asked      Service Not Asked     Blood Transfusions Not Asked     Caffeine Concern Yes     Comment: 1 Coke occasionally      Occupational Exposure Not Asked     Hobby Hazards Not Asked     Sleep Concern Not Asked     Stress Concern Not Asked     Weight Concern Not Asked     Special Diet No     Back Care Not Asked     Exercise No     Bike Helmet Not Asked     Seat Belt Not Asked     Self-Exams Not Asked   Social History Narrative    Retired (disability)          FAMILY HISTORY:  Family History   Problem Relation Age of Onset     Blood Disease Mother         Anemia      "Cardiovascular Father      Cancer - colorectal Maternal Grandfather      Hypertension Brother      Diabetes Sister 5        Constance Diabetes passed at 36     Respiratory Other         Lung Cancer         PHYSICAL EXAM:  Vital signs:  /80   Pulse 94   Temp 97.6  F (36.4  C)   Resp 16   Ht 1.727 m (5' 8\")   Wt 74.8 kg (165 lb)   SpO2 97%   BMI 25.09 kg/m      ECO  GENERAL/CONSTITUTIONAL: No acute distress.  EYES: No scleral icterus.  ENT/MOUTH: Neck supple. Oropharynx clear.  LYMPH: No occipital, pre- or postauricular, submandibular, submental, anterior cervical, posterior cervical, supraclavicular, axillary or inguinal adenopathy.   RESPIRATORY: Clear to auscultation bilaterally. No crackles or wheezing.   CARDIOVASCULAR: Irregular rate and rhythm 3/6 systolic murmur loudest at the aortic position.  GASTROINTESTINAL: No hepatosplenomegaly, masses, or tenderness. No guarding or distention.  MUSCULOSKELETAL:  No cyanosis, clubbing, or edema.  NEUROLOGIC: Cranial nerves II-XII are grossly intact. Alert, oriented, answers questions appropriately.  Decreased strength in the right lower extremity.  INTEGUMENTARY: No rashes or jaundice.  GAIT: Slight limp to the right; uses cane for ambulation.      LABS:  CBC RESULTS:   Recent Labs   Lab Test 20  1420   WBC 10.3   RBC 4.28*   HGB 13.3   HCT 41.0   MCV 96   MCH 31.1   MCHC 32.4   RDW 14.7          Recent Labs   Lab Test 20  1420 19  1325    141   POTASSIUM 4.4 3.7   CHLORIDE 105 102   CO2 29 32*   ANIONGAP 5 10.7   * 99   BUN 8 9   CR 1.02 1.16   ULISSES 9.3 9.3     SPEP - M-spike:  2017: 0.4  2017: 0.4  3/21/2018: 0.4  2018: 0.5  2018: 0.5  2019: 0.7  2020: 0.8    2020:  Serum kappa FLC = 6.79  Serum lambda FLC = 2.58  Ratio = 2.63  IgA = 2.2  IgG = 1984  IgM = 247    PATHOLOGY:  2017 Bone marrow biopsy:   1.  Peripheral blood with mild normochromic normocytic anemia.   2. "  Normocellular bone marrow with a 0.5% plasma cells with monoclonal   light chain restriction identified in 0.3% plasma cells  (MGUS).   Flow cytometry, cytogenetics, and FISH panel studies sent and pending   and be separately reported.   4.  Adequate bone marrow storage iron    IMAGIN/10/2020 CT chest without contrast:  1.  Pulmonary fibrosis, without significant change from the prior  study. CT features are most consistent with a non-IPF diagnosis.  Findings are nonspecific, though differential includes drug toxicity,  combined pulmonary fibrosis and emphysema, postinfectious change, and  other processes.  2.  Diffuse bronchial wall thickening and debris in the areas can be  seen with bronchitis.  3.  Bronchiectasis.  4.  Severe coronary artery disease.  5.  Emphysema.    ASSESSMENT/PLAN:  Efrem Ramos is a 74 year old male with the following issues:  1. Monoclonal gammopathy of undetermined significance, IgG kappa  -I reviewed the 2020 labs with Alfredo.  Overall, his M-spike is increasing slightly over past 3 years.  However, he has no evidence of anemia, hypercalcemia, renal failure, or other end organ disease at the present time.  -I discussed that he has at least 1-2% chance per year of developing multiple myeloma or other lymphoproliferative disorder.  -I recommended return in 1 year with repeat MGUS lab parameters.  -Will also check skeletal survey prior to next visit.    2. History of vitamin B12 deficiency  -2020 Vitamin B12 level was elevated at 1212.   -Currently not anemic.  -He may discontinue vitamin B12 supplement.  Will recheck level prior to next visit.    3. Pulmonary fibrosis, history of pulmonary nodule  -Follows with Dr. Abad.  -2/10/2020 Chest CT scan showed stable pulmonary fibrosis, non-IPF type.  He also has bronchiectasis, severe CAD, and emphysema.    4. Moderate aortic stenosis  -Seen by Dr. Cortes previously in 2019.  -Intermediate to high risk surgical candidate.  Not  "felt to need TAVR at present time due to relative asymptomatic status.    5. Lumbar spine pain with history of L1 compression fracture  -His mid back pain is chronic since at least 2018 when he underwent vertebroplasty for his L1 compression fracture but subsequently suffered a fall and reinjuring his back.  He did not seek medical attention for his fall and has not had evaluation for the back pain since 2018.  -I recommended revisiting spine surgery to have his lumbar spine reevaluated.  He agreed to this plan of care. Referral made.    Return in 1 year with labs done prior to visit.    Cherelle Hurley MD  Hematology/Oncology  [facility]    I spent a total of 40 minutes with the patient, with greater than 50% of the time in counseling and coordination of care.    Oncology Rooming Note    February 24, 2020 12:57 PM   Efrem Ramos is a 74 year old male who presents for:    Chief Complaint   Patient presents with     Oncology Clinic Visit     Initial Vitals: There were no vitals taken for this visit. Estimated body mass index is 24.02 kg/m  as calculated from the following:    Height as of 12/27/19: 1.727 m (5' 8\").    Weight as of 12/27/19: 71.7 kg (158 lb). There is no height or weight on file to calculate BSA.  Data Unavailable Comment: Data Unavailable   No LMP for male patient.  Allergies reviewed: Yes  Medications reviewed: Yes    Medications: Medication refills not needed today.  Pharmacy name entered into [facility]: [id number] [address] AT [facility] & [facility]    Clinical concerns:  doctor was notified.      Claudia Hill MA              Again, thank you for allowing me to participate in the care of your patient.        Sincerely,        Cherelle Hurley MD    "

## 2020-02-24 NOTE — PATIENT INSTRUCTIONS
1. Arrange for neurosurgery return appointment.  2. RTC MD in 1 year with labs and bone survey prior to visit.

## 2020-02-24 NOTE — PROGRESS NOTES
"Oncology Rooming Note    February 24, 2020 12:57 PM   Efrem Ramos is a 74 year old male who presents for:    Chief Complaint   Patient presents with     Oncology Clinic Visit     Initial Vitals: There were no vitals taken for this visit. Estimated body mass index is 24.02 kg/m  as calculated from the following:    Height as of 12/27/19: 1.727 m (5' 8\").    Weight as of 12/27/19: 71.7 kg (158 lb). There is no height or weight on file to calculate BSA.  Data Unavailable Comment: Data Unavailable   No LMP for male patient.  Allergies reviewed: Yes  Medications reviewed: Yes    Medications: Medication refills not needed today.  Pharmacy name entered into [facility]: [facility] #[id number] - [place], [place] - [address] AT [facility] & [facility]    Clinical concerns:  doctor was notified.      Claudia Hill MA            "

## 2020-02-24 NOTE — PROGRESS NOTES
[facility]    Hematology/Oncology Established Patient Follow-up Note      Today's Date: 02/24/20    Reason for Follow-up: MGUS, IgG kappa type. Transfer of care.  Previously seen by Dr. Shae Aldana.    HISTORY OF PRESENT ILLNESS: Efrem Ramos is a 74 year old male who presents for follow-up of MGUS and vitamin B12 deficiency anemia.  He was previously followed by Dr. Alan Solomon for MGUS in 2013 when his M-spike was 0.6 g/dL.  He then transferred his care to Dr. Shae Aldana around 2015.  He underwent bone marrow biopsy on 6/08/2017 which showed normocellular marrow with 0.3% monoclonal plasma cells; 1 of 20 metaphase cells had a hyperdiploid karyotype with gains of one extra copy of chromosomes 5, 7, 9, 15, and loss of 1 copy each of chromosomes 13 and 22; FISH showed loss of chromosome 13.  For his vitamin B12 deficiency anemia, he received oral B12 2000 mcg once per week.      INTERIM HISTORY:  Efrem Ramos reports stable fatigue.  He has chronic right lower extremity weakness that he is attributed to prior blood clot.  He is on Eliquis for this.  He is also taking gabapentin for restless leg syndrome.  Otherwise, he denies any fevers, chills, night sweats, unintentional weight loss, bowel or bladder dysfunction.  He has chronic back pain that he attributes to a fall after vertebroplasty in 2018 for lumbar spine L1 compression fracture.  He never had follow-up for this after the fall.      REVIEW OF SYSTEMS:   14 point ROS was reviewed and is negative other than as noted above in HPI.       HOME MEDICATIONS:  Current Outpatient Medications   Medication Sig Dispense Refill     acetaminophen (TYLENOL) 325 MG tablet Take 325-650 mg by mouth every 6 hours as needed for mild pain       apixaban ANTICOAGULANT (ELIQUIS) 5 MG tablet Take 1 tablet (5 mg) by mouth 2 times daily 180 tablet 3     ASPIRIN PO Take 81 mg by mouth every evening        atorvastatin (LIPITOR) 40 MG tablet Take 1 tablet  (40 mg) by mouth daily 90 tablet 3     calcium carbonate 600 mg-vitamin D 400 units (CALTRATE) 600-400 MG-UNIT per tablet Take 1 tablet by mouth 2 times daily       Cyanocobalamin 2000 MCG TABS Take 2,000 mcg by mouth every 7 days On Sundays       furosemide (LASIX) 20 MG tablet Take 1 tablet (20 mg) by mouth daily 60 tablet 1     gabapentin (NEURONTIN) 300 MG capsule Take 2 capsules (600 mg) by mouth every evening 60 capsule 5     loperamide (IMODIUM) 2 MG capsule Take 2 mg by mouth 4 times daily as needed for diarrhea       melatonin 1 MG TABS tablet Take 1-3 mg by mouth nightly as needed for sleep       metoprolol succinate ER (TOPROL-XL) 25 MG 24 hr tablet Take 0.5 tablets (12.5 mg) by mouth daily 45 tablet 3     multivitamin (OCUVITE) TABS Take 1 tablet by mouth 2 times daily        polyethylene glycol (MIRALAX/GLYCOLAX) powder Take 1 capful by mouth daily as needed for constipation       potassium chloride ER 20 MEQ PO CR tablet Take 1 tablet (20 mEq) by mouth daily 90 tablet 3         ALLERGIES:  Allergies   Allergen Reactions     Lasix [Furosemide] Other (See Comments) and Swelling     Throat swelling, wheezing     Sulfa Drugs Difficulty breathing     Adhesive Tape Blisters     Amiodarone Other (See Comments)     Developed pleural effusion     Penicillins Unknown     Reaction occurred as a child         PAST MEDICAL HISTORY:  Past Medical History:   Diagnosis Date     Atrial fibrillation (H)      Benign essential hypertension 11/20/2018     Cancer (H) vocal cord     Carpal tunnel syndrome     abstracted 7/3/02.     CVA (cerebral infarction) 5/5/2015     Demyelinating disease of central nervous system, unspecified (H)     abstracted 7/3/02.     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     Pneumonia 8/17/2016     PVD (peripheral vascular disease) (H)      Redundant colon     needs CT colonography     S/P coronary angiogram 09/05/2017     Shingles      SKIN DISORDERS NEC 3/15/2005     Sleep apnea          PAST SURGICAL HISTORY:  Past Surgical History:   Procedure Laterality Date     BIOPSY  brain 2002     BONE MARROW BIOPSY, BONE SPECIMEN, NEEDLE/TROCAR N/A 6/8/2017    Procedure: BIOPSY BONE MARROW;  UNILATERAL BONE MARROW BIOPSY (CONSCIOUS SEDATION) ;  Surgeon: Jamie Gonzales MD;  Location:  GI     C NONSPECIFIC PROCEDURE  as a child    T & A. abstracted 7/3/02.     C NONSPECIFIC PROCEDURE  early    CTR. abstracted 7/3/02.     CARDIAC CATHERIZATION  09/05/2017    2V CAD, IFR of RCA 0.95     ESOPHAGOSCOPY, GASTROSCOPY, DUODENOSCOPY (EGD), COMBINED N/A 9/8/2018    Procedure: COMBINED ESOPHAGOSCOPY, GASTROSCOPY, DUODENOSCOPY (EGD), BIOPSY SINGLE OR MULTIPLE;;  Surgeon: Xander Marie MD;  Location:  GI     HEAD & NECK SURGERY       ORTHOPEDIC SURGERY      right arm ulna reset after injury     THORACOSCOPIC WEDGE RESECTION LUNG Right 8/2/2016    Procedure: THORACOSCOPIC WEDGE RESECTION LUNG;  Surgeon: Abdelrahman Noriega MD;  Location:  OR         SOCIAL HISTORY:  Social History     Socioeconomic History     Marital status:      Spouse name: Not on file     Number of children: Not on file     Years of education: Not on file     Highest education level: Not on file   Occupational History     Not on file   Social Needs     Financial resource strain: Not on file     Food insecurity:     Worry: Not on file     Inability: Not on file     Transportation needs:     Medical: Not on file     Non-medical: Not on file   Tobacco Use     Smoking status: Former Smoker      Packs/day: 1.00     Years: 30.00     Pack years: 30.00     Types: Cigarettes     Last attempt to quit: 2013     Years since quittin.5     Smokeless tobacco: Never Used   Substance and Sexual Activity     Alcohol use: Yes     Alcohol/week: 42.0 standard drinks     Types: 42 Standard drinks or equivalent per week     Comment: occ     Drug use: No     Sexual activity: Not Currently   Lifestyle     Physical activity:     Days per week: Not on file     Minutes per session: Not on file     Stress: Not on file   Relationships     Social connections:     Talks on phone: Not on file     Gets together: Not on file     Attends Christianity service: Not on file     Active member of club or organization: Not on file     Attends meetings of clubs or organizations: Not on file     Relationship status: Not on file     Intimate partner violence:     Fear of current or ex partner: Not on file     Emotionally abused: Not on file     Physically abused: Not on file     Forced sexual activity: Not on file   Other Topics Concern     Parent/sibling w/ CABG, MI or angioplasty before 65F 55M? Not Asked      Service Not Asked     Blood Transfusions Not Asked     Caffeine Concern Yes     Comment: 1 Coke occasionally      Occupational Exposure Not Asked     Hobby Hazards Not Asked     Sleep Concern Not Asked     Stress Concern Not Asked     Weight Concern Not Asked     Special Diet No     Back Care Not Asked     Exercise No     Bike Helmet Not Asked     Seat Belt Not Asked     Self-Exams Not Asked   Social History Narrative    Retired (disability)          FAMILY HISTORY:  Family History   Problem Relation Age of Onset     Blood Disease Mother         Anemia     Cardiovascular Father      Cancer - colorectal Maternal Grandfather      Hypertension Brother      Diabetes Sister 5        Juvinile Diabetes passed at 36     Respiratory Other         Lung Cancer         PHYSICAL EXAM:  Vital signs:  /80   Pulse 94   " Temp 97.6  F (36.4  C)   Resp 16   Ht 1.727 m (5' 8\")   Wt 74.8 kg (165 lb)   SpO2 97%   BMI 25.09 kg/m     ECO  GENERAL/CONSTITUTIONAL: No acute distress.  EYES: No scleral icterus.  ENT/MOUTH: Neck supple. Oropharynx clear.  LYMPH: No occipital, pre- or postauricular, submandibular, submental, anterior cervical, posterior cervical, supraclavicular, axillary or inguinal adenopathy.   RESPIRATORY: Clear to auscultation bilaterally. No crackles or wheezing.   CARDIOVASCULAR: Irregular rate and rhythm 3/6 systolic murmur loudest at the aortic position.  GASTROINTESTINAL: No hepatosplenomegaly, masses, or tenderness. No guarding or distention.  MUSCULOSKELETAL:  No cyanosis, clubbing, or edema.  NEUROLOGIC: Cranial nerves II-XII are grossly intact. Alert, oriented, answers questions appropriately.  Decreased strength in the right lower extremity.  INTEGUMENTARY: No rashes or jaundice.  GAIT: Slight limp to the right; uses cane for ambulation.      LABS:  CBC RESULTS:   Recent Labs   Lab Test 20  1420   WBC 10.3   RBC 4.28*   HGB 13.3   HCT 41.0   MCV 96   MCH 31.1   MCHC 32.4   RDW 14.7          Recent Labs   Lab Test 20  1420 19  1325    141   POTASSIUM 4.4 3.7   CHLORIDE 105 102   CO2 29 32*   ANIONGAP 5 10.7   * 99   BUN 8 9   CR 1.02 1.16   ULISSES 9.3 9.3     SPEP - M-spike:  2017: 0.4  2017: 0.4  3/21/2018: 0.4  2018: 0.5  2018: 0.5  2019: 0.7  2020: 0.8    2020:  Serum kappa FLC = 6.79  Serum lambda FLC = 2.58  Ratio = 2.63  IgA = 2.2  IgG = 1984  IgM = 247    PATHOLOGY:  2017 Bone marrow biopsy:   1.  Peripheral blood with mild normochromic normocytic anemia.   2.  Normocellular bone marrow with a 0.5% plasma cells with monoclonal   light chain restriction identified in 0.3% plasma cells  (MGUS).   Flow cytometry, cytogenetics, and FISH panel studies sent and pending   and be separately reported.   4.  Adequate bone marrow storage " iron    IMAGIN/10/2020 CT chest without contrast:  1.  Pulmonary fibrosis, without significant change from the prior  study. CT features are most consistent with a non-IPF diagnosis.  Findings are nonspecific, though differential includes drug toxicity,  combined pulmonary fibrosis and emphysema, postinfectious change, and  other processes.  2.  Diffuse bronchial wall thickening and debris in the areas can be  seen with bronchitis.  3.  Bronchiectasis.  4.  Severe coronary artery disease.  5.  Emphysema.    ASSESSMENT/PLAN:  Efrem Ramos is a 74 year old male with the following issues:  1. Monoclonal gammopathy of undetermined significance, IgG kappa  -I reviewed the 2020 labs with Alfredo.  Overall, his M-spike is increasing slightly over past 3 years.  However, he has no evidence of anemia, hypercalcemia, renal failure, or other end organ disease at the present time.  -I discussed that he has at least 1-2% chance per year of developing multiple myeloma or other lymphoproliferative disorder.  -I recommended return in 1 year with repeat MGUS lab parameters.  -Will also check skeletal survey prior to next visit.    2. History of vitamin B12 deficiency  -2020 Vitamin B12 level was elevated at 1212.   -Currently not anemic.  -He may discontinue vitamin B12 supplement.  Will recheck level prior to next visit.    3. Pulmonary fibrosis, history of pulmonary nodule  -Follows with Dr. Abad.  -2/10/2020 Chest CT scan showed stable pulmonary fibrosis, non-IPF type.  He also has bronchiectasis, severe CAD, and emphysema.    4. Moderate aortic stenosis  -Seen by Dr. Cortes previously in 2019.  -Intermediate to high risk surgical candidate.  Not felt to need TAVR at present time due to relative asymptomatic status.    5. Lumbar spine pain with history of L1 compression fracture  -His mid back pain is chronic since at least 2018 when he underwent vertebroplasty for his L1 compression fracture but subsequently  suffered a fall and reinjuring his back.  He did not seek medical attention for his fall and has not had evaluation for the back pain since 2018.  -I recommended revisiting spine surgery to have his lumbar spine reevaluated.  He agreed to this plan of care. Referral made.    Return in 1 year with labs done prior to visit.    Cherelle Hurley MD  Hematology/Oncology  [facility]    I spent a total of 40 minutes with the patient, with greater than 50% of the time in counseling and coordination of care.

## 2020-02-26 ENCOUNTER — OFFICE VISIT (OUTPATIENT)
Dept: NEUROSURGERY | Facility: CLINIC | Age: 75
End: 2020-02-26
Attending: PHYSICIAN ASSISTANT
Payer: MEDICARE

## 2020-02-26 VITALS
RESPIRATION RATE: 16 BRPM | BODY MASS INDEX: 25.01 KG/M2 | HEART RATE: 114 BPM | OXYGEN SATURATION: 97 % | HEIGHT: 68 IN | WEIGHT: 165 LBS | SYSTOLIC BLOOD PRESSURE: 120 MMHG | DIASTOLIC BLOOD PRESSURE: 75 MMHG

## 2020-02-26 DIAGNOSIS — M54.50 LUMBAR SPINE PAIN: ICD-10-CM

## 2020-02-26 DIAGNOSIS — Z98.890 S/P VERTEBROPLASTY: Primary | ICD-10-CM

## 2020-02-26 PROCEDURE — 99214 OFFICE O/P EST MOD 30 MIN: CPT | Performed by: PHYSICIAN ASSISTANT

## 2020-02-26 PROCEDURE — G0463 HOSPITAL OUTPT CLINIC VISIT: HCPCS

## 2020-02-26 ASSESSMENT — MIFFLIN-ST. JEOR: SCORE: 1452.94

## 2020-02-26 ASSESSMENT — PAIN SCALES - GENERAL: PAINLEVEL: MODERATE PAIN (4)

## 2020-02-26 NOTE — PROGRESS NOTES
"[facility]  Neurosurgery followup:    HPI: Efrem Ramos is a 73 year old male who presents for evaluation of back pain x 3-4 weeks. He was trying to get out of bed too quickly and had sudden acute back pain. He was recently found to have L1 compression fracture on XR bone survey and followed up with oncology who felt fracture was likely due to osteoporosis. Pain is located in bilateral low back and does not radiate down into the legs. Describes the pain as a constant ache with intermittent sharpness. Pain is worsened with movement. Pain is alleviated with tylenol. He does not have a brace. Denies bladder/bowel incontinence.  Returns for follow up. Continued back localized to midline lumbar region. Underwent vertebroplasty 10/2018. He did experience another fall about 1-2 weeks after and did not seek medical evaluation. States pain has been pretty consistent since this time rated 4/10. Has been wearing back brace for comfort which is slightly helpful. Has not had any recent imaging. No overt weakness.   Exam:  Constitutional:  Alert, well nourished, NAD.  HEENT: Normocephalic, atraumatic.   Pulm:  Without shortness of breath   CV:  No pitting edema of BLE.      /75   Pulse 114   Resp 16   Ht 5' 8\" (1.727 m)   Wt 165 lb (74.8 kg)   SpO2 97%   BMI 25.09 kg/m      Neurological:  Awake  Alert  Oriented x 3  Motor exam:        IP Q DF PF EHL  R   5  5   5   5    5  L   5  5   5   5    5     Reflexes are 2+ in the patellar and Achilles. There is no clonus. Downgoing Babinski.    Able to spontaneously move L/E bilaterally  Sensation intact throughout all L/E dermatomes    Ambulates with walker     Imaging: No recent imaging  A/P: Returns for follow up. Continued back localized to midline lumbar region. Underwent vertebroplasty 10/2018. He did experience another fall about 1-2 weeks after and did not seek medical evaluation. Will obtain updated lumbar MRI and call him with results. Patient " voiced understanding and agreement.          Ewa Rodríguez PA-C  [facility]  [address] [address]    Tel [phone]  Pager [phone]

## 2020-02-26 NOTE — NURSING NOTE
"Efrem Ramos is a 74 year old male who presents for:  Chief Complaint   Patient presents with     Consult For     compression fracture of vertebra        Initial Vitals:  /75   Pulse 114   Resp 16   Ht 5' 8\" (1.727 m)   Wt 165 lb (74.8 kg)   SpO2 97%   BMI 25.09 kg/m   Estimated body mass index is 25.09 kg/m  as calculated from the following:    Height as of this encounter: 5' 8\" (1.727 m).    Weight as of this encounter: 165 lb (74.8 kg).. Body surface area is 1.89 meters squared. BP completed using cuff size: regular  Moderate Pain (4)    Nursing Comments: Pt present today for consult of compression fracture of first vertebra.    Markie Velazquez, [facility]    "

## 2020-02-26 NOTE — LETTER
"    2/26/2020         RE: Efrem Ramos  [address]  [facility] [facility] [address]        Dear Colleague,    Thank you for referring your patient, Efrem Ramos, to the [facility]. Please see a copy of my visit note below.    [facility]  Neurosurgery followup:    HPI: Efrem Ramos is a 73 year old male who presents for evaluation of back pain x 3-4 weeks. He was trying to get out of bed too quickly and had sudden acute back pain. He was recently found to have L1 compression fracture on XR bone survey and followed up with oncology who felt fracture was likely due to osteoporosis. Pain is located in bilateral low back and does not radiate down into the legs. Describes the pain as a constant ache with intermittent sharpness. Pain is worsened with movement. Pain is alleviated with tylenol. He does not have a brace. Denies bladder/bowel incontinence.  Returns for follow up. Continued back localized to midline lumbar region. Underwent vertebroplasty 10/2018. He did experience another fall about 1-2 weeks after and did not seek medical evaluation. States pain has been pretty consistent since this time rated 4/10. Has been wearing back brace for comfort which is slightly helpful. Has not had any recent imaging. No overt weakness.   Exam:  Constitutional:  Alert, well nourished, NAD.  HEENT: Normocephalic, atraumatic.   Pulm:  Without shortness of breath   CV:  No pitting edema of BLE.      /75   Pulse 114   Resp 16   Ht 5' 8\" (1.727 m)   Wt 165 lb (74.8 kg)   SpO2 97%   BMI 25.09 kg/m       Neurological:  Awake  Alert  Oriented x 3  Motor exam:        IP Q DF PF EHL  R   5  5   5   5    5  L   5  5   5   5    5     Reflexes are 2+ in the patellar and Achilles. There is no clonus. Downgoing Babinski.    Able to spontaneously move L/E bilaterally  Sensation intact throughout all L/E dermatomes    Ambulates with walker     Imaging: No recent imaging  A/P: " Returns for follow up. Continued back localized to midline lumbar region. Underwent vertebroplasty 10/2018. He did experience another fall about 1-2 weeks after and did not seek medical evaluation. Will obtain updated lumbar MRI and call him with results. Patient voiced understanding and agreement.          Ewa Rodríguez PA-C  [facility]  [address] [place] [address]    Tel [phone]  Pager [phone]      Again, thank you for allowing me to participate in the care of your patient.        Sincerely,        Ewa Rodríguez PA-C

## 2020-03-04 ENCOUNTER — TELEPHONE (OUTPATIENT)
Dept: NEUROSURGERY | Facility: CLINIC | Age: 75
End: 2020-03-04

## 2020-03-04 ENCOUNTER — HOSPITAL ENCOUNTER (OUTPATIENT)
Dept: MRI IMAGING | Facility: CLINIC | Age: 75
Discharge: HOME | End: 2020-03-04
Attending: PHYSICIAN ASSISTANT | Admitting: PHYSICIAN ASSISTANT
Payer: MEDICARE

## 2020-03-04 DIAGNOSIS — M54.50 LUMBAR SPINE PAIN: ICD-10-CM

## 2020-03-04 DIAGNOSIS — Z98.890 S/P VERTEBROPLASTY: ICD-10-CM

## 2020-03-04 DIAGNOSIS — M80.08XA AGE-RELATED OSTEOPOROSIS WITH CURRENT PATHOLOGICAL FRACTURE, VERTEBRA(E), INITIAL ENCOUNTER FOR FRACTURE (H): ICD-10-CM

## 2020-03-04 DIAGNOSIS — S32.010A CLOSED COMPRESSION FRACTURE OF FIRST LUMBAR VERTEBRA, INITIAL ENCOUNTER: Primary | ICD-10-CM

## 2020-03-04 DIAGNOSIS — S22.080A T12 COMPRESSION FRACTURE (H): ICD-10-CM

## 2020-03-04 DIAGNOSIS — M81.0 AGE RELATED OSTEOPOROSIS, UNSPECIFIED PATHOLOGICAL FRACTURE PRESENCE: ICD-10-CM

## 2020-03-04 PROCEDURE — 72148 MRI LUMBAR SPINE W/O DYE: CPT

## 2020-03-04 NOTE — TELEPHONE ENCOUNTER
Called and spoke to patient. IR vertebroplasty order placed and staff message sent to Lupe Sneed to coordinate and contact patient.     ----- Message from Ewa Rodríguez PA-C sent at 3/4/2020  4:34 PM CST -----  Please call patient and let him know he has new T12 compression fracture that appears new. Recommend IR vertebroplasty consult. Thanks.

## 2020-03-05 ENCOUNTER — TELEPHONE (OUTPATIENT)
Dept: OTHER | Facility: CLINIC | Age: 75
End: 2020-03-05

## 2020-03-05 NOTE — TELEPHONE ENCOUNTER
Received referral for Vertebroplasty from Ewa ADAMS.  Contacted Juliana ( care coordinator) for [facility] to contact patient to schedule.  Lupe Sneed RN  IR nurse clinician  [phone]

## 2020-03-09 ENCOUNTER — CARE COORDINATION (OUTPATIENT)
Dept: CARDIOLOGY | Facility: CLINIC | Age: 75
End: 2020-03-09

## 2020-03-09 NOTE — PROGRESS NOTES
Dr. Cortes has reviewed echo showing:  Severely calcificied mitral apparatus.   Mild-moderate mitral stenosis. Mean gradient averages 6.8 mmHg at  bpm.  Moderate-severe aortic stenosis. Vmax 3.3 m/s with mean gradient of 32 mmHg, likely underestimated in setting of low flow (Stroke volume index 23 ml/m2).  DVI 0.26 and SEBASTIAN by continuity 0.8 cm2.  Moderate-severe TR.    Dr. Cortes has reviewed echo results.  He would like to see patient along with Dr. Butterfield in clinic.  I have scheduled this patient  to see Dr. Butterfield and Dr. Cortes next Monday 3/16/2020

## 2020-03-13 ENCOUNTER — TELEPHONE (OUTPATIENT)
Dept: CARDIOLOGY | Facility: CLINIC | Age: 75
End: 2020-03-13

## 2020-03-13 DIAGNOSIS — I35.0 AORTIC VALVE STENOSIS, ETIOLOGY OF CARDIAC VALVE DISEASE UNSPECIFIED: ICD-10-CM

## 2020-03-13 RX ORDER — FUROSEMIDE 20 MG
20 TABLET ORAL DAILY
Qty: 90 TABLET | Refills: 0 | Status: ON HOLD | OUTPATIENT
Start: 2020-03-13 | End: 2020-06-04

## 2020-03-13 NOTE — TELEPHONE ENCOUNTER
"Wellness Screening Tool    Symptom Screening:    Do you have a:    Fever?  No    Cough?  No    Shortness of breath?  Yes, not new \"could be his lungs or his heart\"    Skin rash?  No    Within the past 14 days, have you been in contact with someone who:    Is currently being ruled out for COVID-19 (novel coronavirus)?  No    Has tested positive for COVID-10? No    Has symptoms of a respiratory illness (fever, cough, shortness of breath)?  No    Have you or someone you have been in contact with traveled to an area with COVID-19:  Refer to the CDC Coronavirus webpage for COVID-19 areas: No travel noted    Within the past 3 weeks, have you been exposed to the following:    Pertussis?  No    Chicken pox? No    Measles? No    Patient's appointment status: Spoke with patient's wife, Mariajose, who answered these questions on behalf of patient. Patient will be seen in clinic as scheduled on Monday 3/16/20. They will be coming with their son. Bella Dunn RN    "

## 2020-03-15 ENCOUNTER — HEALTH MAINTENANCE LETTER (OUTPATIENT)
Age: 75
End: 2020-03-15

## 2020-03-16 ENCOUNTER — OFFICE VISIT (OUTPATIENT)
Dept: CARDIOLOGY | Facility: CLINIC | Age: 75
End: 2020-03-16
Payer: MEDICARE

## 2020-03-16 VITALS
WEIGHT: 166 LBS | HEIGHT: 68 IN | BODY MASS INDEX: 25.16 KG/M2 | HEART RATE: 96 BPM | DIASTOLIC BLOOD PRESSURE: 75 MMHG | SYSTOLIC BLOOD PRESSURE: 110 MMHG

## 2020-03-16 DIAGNOSIS — I48.92 ATRIAL FIBRILLATION AND FLUTTER (H): ICD-10-CM

## 2020-03-16 DIAGNOSIS — R93.1 ABNORMAL FINDINGS ON DIAGNOSTIC IMAGING OF HEART AND CORONARY CIRCULATION: ICD-10-CM

## 2020-03-16 DIAGNOSIS — I35.0 AORTIC VALVE STENOSIS, ETIOLOGY OF CARDIAC VALVE DISEASE UNSPECIFIED: Primary | ICD-10-CM

## 2020-03-16 DIAGNOSIS — I25.10 CORONARY ARTERY DISEASE INVOLVING NATIVE CORONARY ARTERY OF NATIVE HEART WITHOUT ANGINA PECTORIS: ICD-10-CM

## 2020-03-16 DIAGNOSIS — R09.89 ABNORMAL CHEST SOUNDS: Primary | ICD-10-CM

## 2020-03-16 DIAGNOSIS — I35.0 AORTIC STENOSIS: ICD-10-CM

## 2020-03-16 DIAGNOSIS — I48.91 ATRIAL FIBRILLATION AND FLUTTER (H): ICD-10-CM

## 2020-03-16 DIAGNOSIS — I36.1 NONRHEUMATIC TRICUSPID VALVE REGURGITATION: ICD-10-CM

## 2020-03-16 PROCEDURE — 99215 OFFICE O/P EST HI 40 MIN: CPT | Performed by: INTERNAL MEDICINE

## 2020-03-16 ASSESSMENT — MIFFLIN-ST. JEOR: SCORE: 1457.47

## 2020-03-16 NOTE — PROGRESS NOTES
HPI and Plan:     Mr. Ramos comes to structural clinic today with his son and wife.  He was seen by both myself and Dr. Butterfield.  Mr. Ramos is known to me.  I saw him 1 year ago when we felt that his aortic valve was moderate.  He has had increasing fatigue over the past 6 to 12 months.  Of time.  His aortic valve is now worsened with a mean gradient of 32 mmHg.  Valve area is 0.8.  However he also has concomitant tricuspid valve disease with the echocardiogram showing 3-4+ TR preserved LV function moderate mitral valve stenosis with a mean gradient of 7 and mild mitral regurgitation.    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  His last DLCO was performed in 2016 which was 24%.  He gets short of breath with walking minimal distances and really does not do much at home.    Although Mr. Ramos has a low STS score of approximately 3% Dr. Butterfield and I believe he would be a very high surgical risk due to his underlying lung disease and frailty with increased risk for prolonged intubation pneumonia sepsis.  Because of this we have recommended transcatheter aortic valve replacement.  He needs to have a TAVR CT scan done as well as coronary angiography and left and right heart catheterization and repeat pulmonary function test.  He has class III NYHA heart failure symptoms and therefore I would proceed with aortic valve replacement even at times current with the Covidien 19 situation.  Risk and benefits of the procedure been Splane to the patient including the risk of mortality stroke vascular injury and permanent pacemaker.      Encounter Diagnoses   Name Primary?     Aortic valve stenosis, etiology of cardiac valve disease unspecified Yes     Coronary artery disease involving native coronary artery of native heart without angina pectoris      Atrial fibrillation  and flutter (H)      Nonrheumatic tricuspid valve regurgitation        CURRENT MEDICATIONS:  Current Outpatient Medications   Medication Sig Dispense Refill     acetaminophen (TYLENOL) 325 MG tablet Take 325-650 mg by mouth every 6 hours as needed for mild pain       apixaban ANTICOAGULANT (ELIQUIS) 5 MG tablet Take 1 tablet (5 mg) by mouth 2 times daily 180 tablet 3     ASPIRIN PO Take 81 mg by mouth every evening        atorvastatin (LIPITOR) 40 MG tablet Take 1 tablet (40 mg) by mouth daily 90 tablet 3     calcium carbonate 600 mg-vitamin D 400 units (CALTRATE) 600-400 MG-UNIT per tablet Take 1 tablet by mouth 2 times daily       Cyanocobalamin 2000 MCG TABS Take 2,000 mcg by mouth every 7 days On Sundays       furosemide (LASIX) 20 MG tablet Take 1 tablet (20 mg) by mouth daily 90 tablet 0     gabapentin (NEURONTIN) 300 MG capsule Take 2 capsules (600 mg) by mouth every evening 60 capsule 5     loperamide (IMODIUM) 2 MG capsule Take 2 mg by mouth 4 times daily as needed for diarrhea       melatonin 1 MG TABS tablet Take 1-3 mg by mouth nightly as needed for sleep       metoprolol succinate ER (TOPROL-XL) 25 MG 24 hr tablet Take 0.5 tablets (12.5 mg) by mouth daily 45 tablet 3     multivitamin (OCUVITE) TABS Take 1 tablet by mouth 2 times daily        polyethylene glycol (MIRALAX/GLYCOLAX) powder Take 1 capful by mouth daily as needed for constipation       potassium chloride ER 20 MEQ PO CR tablet Take 1 tablet (20 mEq) by mouth daily 90 tablet 3       ALLERGIES     Allergies   Allergen Reactions     Lasix [Furosemide] Other (See Comments) and Swelling     Throat swelling, wheezing     Sulfa Drugs Difficulty breathing     Adhesive Tape Blisters     Amiodarone Other (See Comments)     Developed pleural effusion     Penicillins Unknown     Reaction occurred as a child       PAST MEDICAL, SURGICAL, FAMILY, SOCIAL HISTORY:  History was reviewed and updated as needed, see medical record.    Review of Systems:  A  12-point review of systems was completed, see medical record for detailed review of systems information.    Physical Exam:  Vitals: There were no vitals taken for this visit.    Constitutional:  cooperative, alert and oriented, well developed, well nourished, in no acute distress frail      Skin:  warm and dry to the touch, no apparent skin lesions or masses noted          Head:  normocephalic, no masses or lesions        Eyes:  pupils equal and round, conjunctivae and lids unremarkable, sclera white, no xanthalasma, EOMS intact, no nystagmus        Lymph:No Cervical lymphadenopathy present     ENT:  no pallor or cyanosis, dentition good        Neck:  carotid pulses are full and equal bilaterally, JVP normal, no carotid bruit        Respiratory:  clear to auscultation;normal symmetry;no tenderness to palpation;normal respiratory excursion;no intercostal retraction;no use of accessory muscles fine rales  bilateral upper lobes    Cardiac: regular rhythm;no S3 or S4 irregular rhythm     systolic murmur;late systolic murmur;grade 3 systolic murmur;RUSB   S1, S2 regular with 3/6 ESM LUSB  not assessed this visit pulses below the femoral arteries are diminished;radial-femoral delay                             right radial bruit (-)   R radial artery without hematoma, bruising or tenderness    GI:  BS normoactive;non-tender        Extremities and Muscular Skeletal:  no deformities, clubbing, cyanosis, erythema observed;no edema   bilateral LE edema;2+ RLE edema;trace;pitting LLE edema;pitting;2+ up to shin    Neurological:  no gross motor deficits;affect appropriate ataxia;limb weakness      Psych:  Alert and Oriented x 3        Recent Lab Results:  LIPID RESULTS:  Lab Results   Component Value Date    CHOL 118 06/24/2019    HDL 51 06/24/2019    LDL 51 06/24/2019    TRIG 80 06/24/2019    CHOLHDLRATIO 2.7 09/04/2015       LIVER ENZYME RESULTS:  Lab Results   Component Value Date    AST 23 01/09/2020    ALT 18 01/09/2020        CBC RESULTS:  Lab Results   Component Value Date    WBC 10.3 01/09/2020    RBC 4.28 (L) 01/09/2020    HGB 13.3 01/09/2020    HCT 41.0 01/09/2020    MCV 96 01/09/2020    MCH 31.1 01/09/2020    MCHC 32.4 01/09/2020    RDW 14.7 01/09/2020     01/09/2020       BMP RESULTS:  Lab Results   Component Value Date     01/09/2020    POTASSIUM 4.4 01/09/2020    CHLORIDE 105 01/09/2020    CO2 29 01/09/2020    ANIONGAP 5 01/09/2020     (H) 01/09/2020    BUN 8 01/09/2020    CR 1.02 01/09/2020    GFRESTIMATED 71 01/09/2020    GFRESTBLACK 82 01/09/2020    ULISSES 9.3 01/09/2020        A1C RESULTS:  Lab Results   Component Value Date    A1C 5.3 09/04/2015       INR RESULTS:  Lab Results   Component Value Date    INR 1.08 10/22/2018    INR 1.02 09/05/2017           CC  Danny Paige MD  [address]  [place] [id number]

## 2020-03-16 NOTE — LETTER
3/16/2020       RE: Efrem Ramos  [address]  [facility] [address]       CV Surgery      Tia Butterfield MD    Service Date: 03/16/2020      REFERRING CARDIOLOGIST:  Severo Xiong MD      REASON FOR CONSULTATION:  Evaluation for severe aortic stenosis and severe tricuspid valve regurgitation.      HISTORY OF PRESENT ILLNESS:  Mr. 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  He is chronically short of breath at baseline and is able to ambulate only a minimal distance and has been so for quite some time.  He was referred to us for worsening aortic stenosis, now to the severe range, and also severe tricuspid valve regurgitation with moderate mitral valve stenosis as well.  I am seeing him together today with Dr. Cortes to discuss any potential surgical or percutaneous intervention for his multivalvular disease.      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      PAST SURGICAL HISTORY:  Bone marrow biopsy, tonsillectomy, right arm ulna resection, thoracoscopic lung resection in 2016 on the right.      ALLERGIES:  Lasix, sulfa, amiodarone, penicillin.       CURRENT OUTPATIENT MEDICATIONS:  Reviewed in Epic chart.      FAMILY HISTORY:  Noncontributory.      SOCIAL HISTORY:  , former pack a day smoker, quit in 2013.  Drinks occasional alcohol.      REVIEW OF SYSTEMS:  As per HPI.  All other 10-point review of systems are completed and were otherwise negative unless stated above.      PHYSICAL EXAMINATION:   VITAL SIGNS:  Blood pressure is 110/75, pulse is 96, 75  kg, 5 feet 8 inches, BMI of 25, BSA 1.9.   GENERAL:  He appears quite frail but in no acute distress.   HEENT:  Within normal limits.   NECK:  Supple.   CARDIOVASCULAR:  Irregularly irregular, normal S1, S2.  Grade 2/6 systolic murmur at the apex.   LUNGS:  Distant bilaterally.   ABDOMEN:  Soft, nontender, nondistended.   EXTREMITIES:  Negative for edema, cyanosis or clubbing.   NEUROLOGIC:  A&O x3 with no focal deficits.      LABORATORY DATA:  Most recent transthoracic echo from 02/10/2020 demonstrated moderate LVH with EF of 50%-55%, mildly decreased RV systolic function, severely calcified mitral valve apparatus with mild to moderate mitral valve stenosis, mean gradient of 7 mmHg, severe aortic stenosis with mean gradient of 32 mmHg, calculated valve area of 0.8 cm2 and severe tricuspid valve regurgitation with mild pulmonary hypertension.      IMPRESSION AND PLAN:  Mr. Ramos is a 74 gentleman with multivalvular disease, namely severe AS, mild to moderate mitral valve stenosis, severe tricuspid valve regurgitation with multiple medical comorbidities but in particular severely reduced pulmonary function from presumed pulmonary fibrosis with a DLCO of 24% based on a PFT from 2016.  He is very deconditioned, quite frail and ambulates very minimally.  Although the STS score is only 3%, I do think that his surgical risk would be prohibitively high for multivalvular open heart surgery.  Dr. Cortes also agrees.  We did discuss the possibility of being able to replace his aortic valve via transcatheter approach (TAVR) and will proceed with this workup with a TAVR protocol CT scan as well as a left and right heart catheterization.  Once the studies are completed, we will discuss his case at our upcoming multidisciplinary TAVR conference for group consensus treatment plan.      It was a pleasure to meet Mr. Ramos today.         ROLLY KING MD             D: 03/21/2020   T: 03/23/2020   MT: little      Name:      ADORE PHANI   MRN:      [id number]        Account:      [id number]   :      1945           Service Date: 2020      Document: [id number]       Tia Butterfield MD

## 2020-03-16 NOTE — LETTER
3/16/2020    Danny Paige MD, MD  [address]  [facility] [address]    RE: Efrem Ramos       Dear Colleague,    I had the pleasure of seeing Efrem Ramos in the [facility].    HPI and Plan:     Mr. Ramos comes to structural clinic today with his son and wife.  He was seen by both myself and Dr. Butterfiled.  Mr. Ramos is known to me.  I saw him 1 year ago when we felt that his aortic valve was moderate.  He has had increasing fatigue over the past 6 to 12 months.  Of time.  His aortic valve is now worsened with a mean gradient of 32 mmHg.  Valve area is 0.8.  However he also has concomitant tricuspid valve disease with the echocardiogram showing 3-4+ TR preserved LV function moderate mitral valve stenosis with a mean gradient of 7 and mild mitral regurgitation.    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  His last DLCO was performed in 2016 which was 24%.  He gets short of breath with walking minimal distances and really does not do much at home.    Although Mr. Ramos has a low STS score of approximately 3% Dr. Butterfield and I believe he would be a very high surgical risk due to his underlying lung disease and frailty with increased risk for prolonged intubation pneumonia sepsis.  Because of this we have recommended transcatheter aortic valve replacement.  He needs to have a TAVR CT scan done as well as coronary angiography and left and right heart catheterization and repeat pulmonary function test.  He has class III NYHA heart failure symptoms and therefore I would proceed with aortic valve replacement even at times current with the Covidien 19 situation.  Risk and benefits of the procedure been Splane to the patient including the risk of mortality stroke vascular injury and permanent  pacemaker.      Encounter Diagnoses   Name Primary?     Aortic valve stenosis, etiology of cardiac valve disease unspecified Yes     Coronary artery disease involving native coronary artery of native heart without angina pectoris      Atrial fibrillation and flutter (H)      Nonrheumatic tricuspid valve regurgitation        CURRENT MEDICATIONS:  Current Outpatient Medications   Medication Sig Dispense Refill     acetaminophen (TYLENOL) 325 MG tablet Take 325-650 mg by mouth every 6 hours as needed for mild pain       apixaban ANTICOAGULANT (ELIQUIS) 5 MG tablet Take 1 tablet (5 mg) by mouth 2 times daily 180 tablet 3     ASPIRIN PO Take 81 mg by mouth every evening        atorvastatin (LIPITOR) 40 MG tablet Take 1 tablet (40 mg) by mouth daily 90 tablet 3     calcium carbonate 600 mg-vitamin D 400 units (CALTRATE) 600-400 MG-UNIT per tablet Take 1 tablet by mouth 2 times daily       Cyanocobalamin 2000 MCG TABS Take 2,000 mcg by mouth every 7 days On Sundays       furosemide (LASIX) 20 MG tablet Take 1 tablet (20 mg) by mouth daily 90 tablet 0     gabapentin (NEURONTIN) 300 MG capsule Take 2 capsules (600 mg) by mouth every evening 60 capsule 5     loperamide (IMODIUM) 2 MG capsule Take 2 mg by mouth 4 times daily as needed for diarrhea       melatonin 1 MG TABS tablet Take 1-3 mg by mouth nightly as needed for sleep       metoprolol succinate ER (TOPROL-XL) 25 MG 24 hr tablet Take 0.5 tablets (12.5 mg) by mouth daily 45 tablet 3     multivitamin (OCUVITE) TABS Take 1 tablet by mouth 2 times daily        polyethylene glycol (MIRALAX/GLYCOLAX) powder Take 1 capful by mouth daily as needed for constipation       potassium chloride ER 20 MEQ PO CR tablet Take 1 tablet (20 mEq) by mouth daily 90 tablet 3       ALLERGIES     Allergies   Allergen Reactions     Lasix [Furosemide] Other (See Comments) and Swelling     Throat swelling, wheezing     Sulfa Drugs Difficulty breathing     Adhesive Tape Blisters     Amiodarone  Other (See Comments)     Developed pleural effusion     Penicillins Unknown     Reaction occurred as a child       PAST MEDICAL, SURGICAL, FAMILY, SOCIAL HISTORY:  History was reviewed and updated as needed, see medical record.    Review of Systems:  A 12-point review of systems was completed, see medical record for detailed review of systems information.    Physical Exam:  Vitals: There were no vitals taken for this visit.    Constitutional:  cooperative, alert and oriented, well developed, well nourished, in no acute distress frail      Skin:  warm and dry to the touch, no apparent skin lesions or masses noted          Head:  normocephalic, no masses or lesions        Eyes:  pupils equal and round, conjunctivae and lids unremarkable, sclera white, no xanthalasma, EOMS intact, no nystagmus        Lymph:No Cervical lymphadenopathy present     ENT:  no pallor or cyanosis, dentition good        Neck:  carotid pulses are full and equal bilaterally, JVP normal, no carotid bruit        Respiratory:  clear to auscultation;normal symmetry;no tenderness to palpation;normal respiratory excursion;no intercostal retraction;no use of accessory muscles fine rales  bilateral upper lobes    Cardiac: regular rhythm;no S3 or S4 irregular rhythm     systolic murmur;late systolic murmur;grade 3 systolic murmur;RUSB   S1, S2 regular with 3/6 ESM LUSB  not assessed this visit pulses below the femoral arteries are diminished;radial-femoral delay                             right radial bruit (-)   R radial artery without hematoma, bruising or tenderness    GI:  BS normoactive;non-tender        Extremities and Muscular Skeletal:  no deformities, clubbing, cyanosis, erythema observed;no edema   bilateral LE edema;2+ RLE edema;trace;pitting LLE edema;pitting;2+ up to shin    Neurological:  no gross motor deficits;affect appropriate ataxia;limb weakness      Psych:  Alert and Oriented x 3        Recent Lab Results:  LIPID RESULTS:  Lab  Results   Component Value Date    CHOL 118 06/24/2019    HDL 51 06/24/2019    LDL 51 06/24/2019    TRIG 80 06/24/2019    CHOLHDLRATIO 2.7 09/04/2015       LIVER ENZYME RESULTS:  Lab Results   Component Value Date    AST 23 01/09/2020    ALT 18 01/09/2020       CBC RESULTS:  Lab Results   Component Value Date    WBC 10.3 01/09/2020    RBC 4.28 (L) 01/09/2020    HGB 13.3 01/09/2020    HCT 41.0 01/09/2020    MCV 96 01/09/2020    MCH 31.1 01/09/2020    MCHC 32.4 01/09/2020    RDW 14.7 01/09/2020     01/09/2020       BMP RESULTS:  Lab Results   Component Value Date     01/09/2020    POTASSIUM 4.4 01/09/2020    CHLORIDE 105 01/09/2020    CO2 29 01/09/2020    ANIONGAP 5 01/09/2020     (H) 01/09/2020    BUN 8 01/09/2020    CR 1.02 01/09/2020    GFRESTIMATED 71 01/09/2020    GFRESTBLACK 82 01/09/2020    ULISSES 9.3 01/09/2020        A1C RESULTS:  Lab Results   Component Value Date    A1C 5.3 09/04/2015       INR RESULTS:  Lab Results   Component Value Date    INR 1.08 10/22/2018    INR 1.02 09/05/2017           Thank you for allowing me to participate in the care of your patient.    Sincerely,     MILADYS ASH MD     [facility]

## 2020-03-16 NOTE — LETTER
3/16/2020      RE: Efrem Ramos  [address]  [facility] [address]       Dear Colleague,    Thank you for the opportunity to participate in the care of your patient, Efrem Ramos, at the [facility] CARDIOTH[facility] at [facility]. Please see a copy of my visit note below.    CV Surgery    Patient seen, clinic note dictated #[id number].    Tia Butterfield MD    Please do not hesitate to contact me if you have any questions/concerns.     Sincerely,     Tia Butterfield MD

## 2020-03-19 ENCOUNTER — TELEPHONE (OUTPATIENT)
Dept: CARDIOLOGY | Facility: CLINIC | Age: 75
End: 2020-03-19

## 2020-03-19 DIAGNOSIS — I35.0 AORTIC STENOSIS: Primary | ICD-10-CM

## 2020-03-19 RX ORDER — SODIUM CHLORIDE 9 MG/ML
INJECTION, SOLUTION INTRAVENOUS CONTINUOUS
Status: CANCELLED | OUTPATIENT
Start: 2020-03-19

## 2020-03-19 RX ORDER — LIDOCAINE 40 MG/G
CREAM TOPICAL
Status: CANCELLED | OUTPATIENT
Start: 2020-03-19

## 2020-03-19 RX ORDER — POTASSIUM CHLORIDE 1500 MG/1
20 TABLET, EXTENDED RELEASE ORAL
Status: CANCELLED | OUTPATIENT
Start: 2020-03-19

## 2020-03-19 NOTE — TELEPHONE ENCOUNTER
Patient is scheduled for a heart cath at [facility] on 3/23/20. Check in time is at 0800am and procedure time is at 1000am.  Patient to be NPO after midnight on 3/23/20.   Patient should hold Lasix the morning of the procedure and take it after the procedure. Patient is not on insulin or on any other diabetic medications. Patient is on an anticoagulant, patient will hold his Eliquis prior to angiogram taking last dose: 3/20/20. Patient is on aspirin and will continue 81mg daily. Patient can take other meds prior to procedure with small sips of water.  Patient denies any contrast allergy.  Patient will have someone available to drive to the hospital, to drive patient home, and have someone available for 24 hours post-procedure.

## 2020-03-23 ENCOUNTER — HOSPITAL ENCOUNTER (OUTPATIENT)
Facility: CLINIC | Age: 75
Discharge: HOME | End: 2020-03-23
Attending: INTERNAL MEDICINE | Admitting: INTERNAL MEDICINE
Payer: MEDICARE

## 2020-03-23 VITALS
HEART RATE: 83 BPM | RESPIRATION RATE: 16 BRPM | SYSTOLIC BLOOD PRESSURE: 108 MMHG | TEMPERATURE: 98.1 F | WEIGHT: 165.2 LBS | HEIGHT: 68 IN | BODY MASS INDEX: 25.04 KG/M2 | DIASTOLIC BLOOD PRESSURE: 80 MMHG | OXYGEN SATURATION: 94 %

## 2020-03-23 DIAGNOSIS — R93.1 ABNORMAL FINDINGS ON DIAGNOSTIC IMAGING OF HEART AND CORONARY CIRCULATION: ICD-10-CM

## 2020-03-23 DIAGNOSIS — I35.0 AORTIC STENOSIS: ICD-10-CM

## 2020-03-23 LAB
ANION GAP SERPL CALCULATED.3IONS-SCNC: 5 MMOL/L (ref 3–14)
APTT PPP: 29 SEC (ref 22–37)
BUN SERPL-MCNC: 11 MG/DL (ref 7–30)
CALCIUM SERPL-MCNC: 9 MG/DL (ref 8.5–10.1)
CHLORIDE SERPL-SCNC: 107 MMOL/L (ref 94–109)
CO2 BLDCOV-SCNC: 26 MMOL/L (ref 21–28)
CO2 SERPL-SCNC: 27 MMOL/L (ref 20–32)
CREAT SERPL-MCNC: 0.97 MG/DL (ref 0.66–1.25)
ERYTHROCYTE [DISTWIDTH] IN BLOOD BY AUTOMATED COUNT: 14.3 % (ref 10–15)
GFR SERPL CREATININE-BSD FRML MDRD: 75 ML/MIN/{1.73_M2}
GLUCOSE SERPL-MCNC: 96 MG/DL (ref 70–99)
HCT VFR BLD AUTO: 38.1 % (ref 40–53)
HGB BLD-MCNC: 12.2 G/DL (ref 13.3–17.7)
INR PPP: 1.07 (ref 0.86–1.14)
LACTATE BLD-SCNC: 0.8 MMOL/L (ref 0.7–2)
MCH RBC QN AUTO: 31.7 PG (ref 26.5–33)
MCHC RBC AUTO-ENTMCNC: 32 G/DL (ref 31.5–36.5)
MCV RBC AUTO: 99 FL (ref 78–100)
PCO2 BLDV: 41 MM HG (ref 40–50)
PH BLDV: 7.4 PH (ref 7.32–7.43)
PLATELET # BLD AUTO: 390 10E9/L (ref 150–450)
PO2 BLDV: 33 MM HG (ref 25–47)
POTASSIUM SERPL-SCNC: 4.3 MMOL/L (ref 3.4–5.3)
RBC # BLD AUTO: 3.85 10E12/L (ref 4.4–5.9)
SAO2 % BLDV FROM PO2: 64 %
SODIUM SERPL-SCNC: 139 MMOL/L (ref 133–144)
WBC # BLD AUTO: 12.9 10E9/L (ref 4–11)

## 2020-03-23 PROCEDURE — 85610 PROTHROMBIN TIME: CPT | Performed by: INTERNAL MEDICINE

## 2020-03-23 PROCEDURE — C1894 INTRO/SHEATH, NON-LASER: HCPCS | Performed by: INTERNAL MEDICINE

## 2020-03-23 PROCEDURE — 93005 ELECTROCARDIOGRAM TRACING: CPT

## 2020-03-23 PROCEDURE — 80048 BASIC METABOLIC PNL TOTAL CA: CPT | Performed by: INTERNAL MEDICINE

## 2020-03-23 PROCEDURE — 40000852 ZZH STATISTIC HEART CATH LAB OR EP LAB

## 2020-03-23 PROCEDURE — 36415 COLL VENOUS BLD VENIPUNCTURE: CPT | Performed by: INTERNAL MEDICINE

## 2020-03-23 PROCEDURE — 25000125 ZZHC RX 250: Performed by: INTERNAL MEDICINE

## 2020-03-23 PROCEDURE — 99152 MOD SED SAME PHYS/QHP 5/>YRS: CPT | Performed by: INTERNAL MEDICINE

## 2020-03-23 PROCEDURE — 93454 CORONARY ARTERY ANGIO S&I: CPT | Performed by: INTERNAL MEDICINE

## 2020-03-23 PROCEDURE — 40000235 ZZH STATISTIC TELEMETRY

## 2020-03-23 PROCEDURE — 85730 THROMBOPLASTIN TIME PARTIAL: CPT | Performed by: INTERNAL MEDICINE

## 2020-03-23 PROCEDURE — C1769 GUIDE WIRE: HCPCS | Performed by: INTERNAL MEDICINE

## 2020-03-23 PROCEDURE — 93571 IV DOP VEL&/PRESS C FLO 1ST: CPT | Mod: 52 | Performed by: INTERNAL MEDICINE

## 2020-03-23 PROCEDURE — 25000128 H RX IP 250 OP 636: Performed by: INTERNAL MEDICINE

## 2020-03-23 PROCEDURE — 83605 ASSAY OF LACTIC ACID: CPT | Performed by: INTERNAL MEDICINE

## 2020-03-23 PROCEDURE — 85027 COMPLETE CBC AUTOMATED: CPT | Performed by: INTERNAL MEDICINE

## 2020-03-23 PROCEDURE — 25800030 ZZH RX IP 258 OP 636: Performed by: INTERNAL MEDICINE

## 2020-03-23 PROCEDURE — 40000065 ZZH STATISTIC EKG NON-CHARGEABLE

## 2020-03-23 PROCEDURE — 36415 COLL VENOUS BLD VENIPUNCTURE: CPT

## 2020-03-23 PROCEDURE — 93010 ELECTROCARDIOGRAM REPORT: CPT | Mod: 59 | Performed by: INTERNAL MEDICINE

## 2020-03-23 PROCEDURE — 27210794 ZZH OR GENERAL SUPPLY STERILE: Performed by: INTERNAL MEDICINE

## 2020-03-23 PROCEDURE — 99153 MOD SED SAME PHYS/QHP EA: CPT | Performed by: INTERNAL MEDICINE

## 2020-03-23 PROCEDURE — 82803 BLOOD GASES ANY COMBINATION: CPT

## 2020-03-23 RX ORDER — NALOXONE HYDROCHLORIDE 0.4 MG/ML
.1-.4 INJECTION, SOLUTION INTRAMUSCULAR; INTRAVENOUS; SUBCUTANEOUS
Status: DISCONTINUED | OUTPATIENT
Start: 2020-03-23 | End: 2020-03-23 | Stop reason: HOSPADM

## 2020-03-23 RX ORDER — SODIUM CHLORIDE 9 MG/ML
INJECTION, SOLUTION INTRAVENOUS CONTINUOUS
Status: DISCONTINUED | OUTPATIENT
Start: 2020-03-23 | End: 2020-03-23 | Stop reason: HOSPADM

## 2020-03-23 RX ORDER — NALOXONE HYDROCHLORIDE 0.4 MG/ML
.2-.4 INJECTION, SOLUTION INTRAMUSCULAR; INTRAVENOUS; SUBCUTANEOUS
Status: DISCONTINUED | OUTPATIENT
Start: 2020-03-23 | End: 2020-03-23 | Stop reason: HOSPADM

## 2020-03-23 RX ORDER — ATROPINE SULFATE 0.1 MG/ML
0.5 INJECTION INTRAVENOUS EVERY 5 MIN PRN
Status: DISCONTINUED | OUTPATIENT
Start: 2020-03-23 | End: 2020-03-23 | Stop reason: HOSPADM

## 2020-03-23 RX ORDER — ACETAMINOPHEN 325 MG/1
650 TABLET ORAL EVERY 4 HOURS PRN
Status: DISCONTINUED | OUTPATIENT
Start: 2020-03-23 | End: 2020-03-23 | Stop reason: HOSPADM

## 2020-03-23 RX ORDER — FLUMAZENIL 0.1 MG/ML
0.2 INJECTION, SOLUTION INTRAVENOUS
Status: DISCONTINUED | OUTPATIENT
Start: 2020-03-23 | End: 2020-03-23 | Stop reason: HOSPADM

## 2020-03-23 RX ORDER — FENTANYL CITRATE 50 UG/ML
25-50 INJECTION, SOLUTION INTRAMUSCULAR; INTRAVENOUS
Status: DISCONTINUED | OUTPATIENT
Start: 2020-03-23 | End: 2020-03-23 | Stop reason: HOSPADM

## 2020-03-23 RX ORDER — LIDOCAINE 40 MG/G
CREAM TOPICAL
Status: DISCONTINUED | OUTPATIENT
Start: 2020-03-23 | End: 2020-03-23 | Stop reason: HOSPADM

## 2020-03-23 RX ORDER — POTASSIUM CHLORIDE 1500 MG/1
20 TABLET, EXTENDED RELEASE ORAL
Status: DISCONTINUED | OUTPATIENT
Start: 2020-03-23 | End: 2020-03-23 | Stop reason: HOSPADM

## 2020-03-23 RX ORDER — FENTANYL CITRATE 50 UG/ML
INJECTION, SOLUTION INTRAMUSCULAR; INTRAVENOUS
Status: DISCONTINUED | OUTPATIENT
Start: 2020-03-23 | End: 2020-03-23 | Stop reason: HOSPADM

## 2020-03-23 RX ORDER — METOPROLOL SUCCINATE 25 MG/1
25 TABLET, EXTENDED RELEASE ORAL DAILY
Qty: 90 TABLET | Refills: 3 | Status: SHIPPED | OUTPATIENT
Start: 2020-03-23 | End: 2020-05-13 | Stop reason: DRUGHIGH

## 2020-03-23 RX ADMIN — SODIUM CHLORIDE 150 ML/HR: 9 INJECTION, SOLUTION INTRAVENOUS at 08:52

## 2020-03-23 ASSESSMENT — MIFFLIN-ST. JEOR: SCORE: 1453.84

## 2020-03-23 NOTE — DISCHARGE INSTRUCTIONS
Cardiac Angiogram Discharge Instructions - Brachial & Femoral    After you go home:      Have an adult stay with you until tomorrow.    Drink extra fluids for 2 days.    You may resume your normal diet.    No smoking       For 24 hours - due to the sedation you received:    Relax and take it easy.    Do NOT make any important or legal decisions.    Do NOT drive or operate machines at home or at work.    Do NOT drink alcohol.    Care of Arm & Groin Puncture Sites:      For the first 24 hrs - check the puncture site every 1-2 hours while awake.    For 2 days, when you cough, sneeze, laugh or move your bowels, hold your hand over the puncture site and press firmly.    Remove the bandaid after 24 hours. If there is minor oozing, apply another bandaid and remove it after 12 hours.    It is normal to have a small bruise or pea size lump at the puncture sites.    You may shower tomorrow. Do NOT take a bath, or use a hot tub or pool for at least 3 days. Do NOT scrub the site. Do not use lotion or powder near the puncture site.    Activity - For 2 days:       Arm site:      Do not use your hand or arm to support your weight (such as rising from a chair)     Do not lift more than 5 pounds or exercise your arm (such as tennis, golf or bowling).       Groin site:          No stooping or squatting    Do NOT do any heavy activity such as exercise, lifting, or straining.     No housework, yard work or any activity that make you sweat    Do NOT lift more than 10 pounds    Bleeding:     Arm site:    If you start bleeding from the site in your arm, sit down and press firmly on/above the site with your fingers for 10 minutes.    Once bleeding stops, keep your arm straight for 2 hours.     Call the [facility] as soon as you can.    Groin site:      If you start bleeding from the site in your groin, lie down flat and press firmly on/above the site for 10 minutes.     Once bleeding stops, lay flat for 2 hours.     Call [facility] as soon as you can.       Call 911 right away if you have heavy bleeding or bleeding that does not stop.      Medicines:      Take your medications, including blood thinners, unless your provider tells you not to.      If you have stopped any medicines, check with your provider about when to restart them.    Follow Up Appointments:      Follow up with [facility] Nurse Practitioner at [facility] of patient preference in 7-10 days.    Call the clinic if:      You have increased pain or a large or growing hard lump around the site.    The site is red, swollen, hot or tender.    Blood or fluid is draining from the site.    You have chills or a fever greater than 101 F (38 C).    Your arm or leg feels numb, cool or changes color.    You have hives, a rash or unusual itching.    New pain in the back or belly that you cannot control with Tylenol.    Any questions or concerns.        [facility] [place]:    [phone] [facility] (7 days a week)

## 2020-03-23 NOTE — PROGRESS NOTES
Care Suites Post Procedure Note    Patient Information  Name: Efrem Ramos  Age: 74 year old    Post Procedure  Procedure: Right and left heart cath   Time patient returned to Care Suites: 1115      Plan/Other: Continue post procedure plan of care  Report received from Yamil to continue monitoring pt.  Right groin dressing CDI, no hematoma. Pulses are dopplerable.  Right brachial and radial CDI.  No hematoma.  Pt has been taking po fluids well, voiding without difficulty.  VS stable, BP has been soft. Denies any pain or discomfort    Anticipate bedrest until 1715  Anticipate discharge at 1800 if stable.  Pt is off bedrest and up to the nails with his walker and tolerate well. Stable sites  Report to Antoinette to continue monitoring pt.      Kam Mohamud RN

## 2020-03-23 NOTE — PROVIDER NOTIFICATION
Dr. Cortes notified of Sepsis BPA alert   Hypotensive and WBC 12.6  Pt. Is afebrile   Lactic acid lab ordered per protocol     Lactate level  .8

## 2020-03-23 NOTE — PROGRESS NOTES
Care Suites Post Procedure Note    Patient Information  Name: Efrem Ramos  Age: 74 year old    Post Procedure  Procedure: Right and Left Heart Cath  No intervention  Time patient returned to Care Suites: 1112  Concerns/abnormal assessment:   Right groin site -6 FR.   Right brachial site redressed/CDI  Right radial attempted site- CDI   If abnormal assessment, provider notified:   1255- Dr. Cortes notified of Sepsis alert - Lactic acid drawn.  Lactate result .8  Plan/Other: Bedrest until 1715   Discharge at 1800  1315- Called wife Mariajose- [phone] Updated with procedure results and discharge plans of 1800.   Mariajose instructed to call CS at 1730 for update.   Efrem- son will plan on picking patient up at Door [address] at 1800    1430- Dr. Laxson called wife and explained increased Metoprolol dose, resume Eliquis tomorrow evening.   AVS reviewed with patient, with emphasis on activity limitations of brachial, radial and right groin site and copy to be sent home.    Yamil Hathaway RN

## 2020-03-23 NOTE — PROGRESS NOTES
1725 Report received from Arben Mohamud RN.  1730 Pt A/O. Gauze drsg CDI top right AC site.  No oozing or hematoma noted. Area soft & flat. Gauze CDI to right groin puncture site. No oozing or hematoma noted. Area soft & flat. Pt denies pain. Pt sitting in bedside chair. No family present.   1745 Pt ready for discharge. Sitting at side of bed.  1800 Pt discharged per w/c to private vehicle. All personal belongings taken w/ pt. Pt's son informed that pt should do no stooping or squatting for next 2 days.

## 2020-03-23 NOTE — PRE-PROCEDURE
GENERAL PRE-PROCEDURE:   Procedure:  Heart catheterization  Date/Time:  3/23/2020 9:31 AM    Written consent obtained?: Yes    Risks and benefits: Risks, benefits and alternatives were discussed    Consent given by:  Patient  Patient states understanding of procedure being performed: Yes    Patient's understanding of procedure matches consent: Yes    Procedure consent matches procedure scheduled: Yes    Expected level of sedation:  Moderate  Appropriately NPO:  Yes  ASA Class:  Class 3- Severe systemic disease, definite functional limitations  Mallampati  :  Grade 2- soft palate, base of uvula, tonsillar pillars, and portion of posterior pharyngeal wall visible  Lungs:  Lungs clear with good breath sounds bilaterally  Heart:  Systolic murmur and a-fib  History & Physical reviewed:  History and physical reviewed and no updates needed  Statement of review:  I have reviewed the lab findings, diagnostic data, medications, and the plan for sedation

## 2020-03-23 NOTE — PROGRESS NOTES
Care Suites Admission Nursing Note    Patient Information  Name: Efrem Ramos  Age: 74 year old  Reason for admission: Right Heart Cath   Care Suites arrival time: 0810    Patient Admission/Assessment    Pre-procedure assessment complete: Yes  If abnormal assessment/labs, provider notified: Yes  Labs in normal limits   NPO: Yes  Medications held per instructions/orders: Yes  Consent: Dr. Ferrer consented   Patient oriented to room: Yes Pt. Is forgetful and needs reminding   Alert and cooperative   Education/questions answered: Yes  Plan/other: Right Heart cath at 10:00  0915- Labs intact   To Cath Lab     Discharge Planning  Accompanied by: self   Wife Mariajose [phone]  Discharge name/phone number: son Efrem to drive home   Overnight post sedation caregiver: Mariajose wife  Discharge location: home    Yamil Hathaway RN

## 2020-03-23 NOTE — PROGRESS NOTES
1115 Report received from Yamil Hathaway RN.  1130 Pt A/O. Gauze drsg CDI to right AC site. No oozing or hematoma noted. Area soft & flat. Right arm elevated on pillows. Gauze drsg CDI to right groin puncture site. No oozing or hematoma noted. Area soft & flat. Pt denies pain. Pt instructed on activity restrictions while on bedrest. Verbal understanding received from pt. No family present at this time.  1210 Report given to Yamil Hathaway RN.

## 2020-03-23 NOTE — PROGRESS NOTES
Service Date: 03/16/2020      REFERRING CARDIOLOGIST:  Severo Xiong MD      REASON FOR CONSULTATION:  Evaluation for severe aortic stenosis and severe tricuspid valve regurgitation.      HISTORY OF PRESENT ILLNESS:  Mr. 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  He is chronically short of breath at baseline and is able to ambulate only a minimal distance and has been so for quite some time.  He was referred to us for worsening aortic stenosis, now to the severe range, and also severe tricuspid valve regurgitation with moderate mitral valve stenosis as well.  I am seeing him together today with Dr. Cortes to discuss any potential surgical or percutaneous intervention for his multivalvular disease.      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      PAST SURGICAL HISTORY:  Bone marrow biopsy, tonsillectomy, right arm ulna resection, thoracoscopic lung resection in 2016 on the right.      ALLERGIES:  Lasix, sulfa, amiodarone, penicillin.       CURRENT OUTPATIENT MEDICATIONS:  Reviewed in Epic chart.      FAMILY HISTORY:  Noncontributory.      SOCIAL HISTORY:  , former pack a day smoker, quit in 2013.  Drinks occasional alcohol.      REVIEW OF SYSTEMS:  As per HPI.  All other 10-point review of systems are completed and were otherwise negative unless stated above.      PHYSICAL EXAMINATION:   VITAL SIGNS:  Blood pressure is 110/75, pulse is 96, 75 kg, 5 feet 8 inches, BMI of 25, BSA 1.9.   GENERAL:  He appears quite frail but in no acute distress.   HEENT:  Within normal  limits.   NECK:  Supple.   CARDIOVASCULAR:  Irregularly irregular, normal S1, S2.  Grade 2/6 systolic murmur at the apex.   LUNGS:  Distant bilaterally.   ABDOMEN:  Soft, nontender, nondistended.   EXTREMITIES:  Negative for edema, cyanosis or clubbing.   NEUROLOGIC:  A&O x3 with no focal deficits.      LABORATORY DATA:  Most recent transthoracic echo from 02/10/2020 demonstrated moderate LVH with EF of 50%-55%, mildly decreased RV systolic function, severely calcified mitral valve apparatus with mild to moderate mitral valve stenosis, mean gradient of 7 mmHg, severe aortic stenosis with mean gradient of 32 mmHg, calculated valve area of 0.8 cm2 and severe tricuspid valve regurgitation with mild pulmonary hypertension.      IMPRESSION AND PLAN:  Mr. Ramos is a 74 gentleman with multivalvular disease, namely severe AS, mild to moderate mitral valve stenosis, severe tricuspid valve regurgitation with multiple medical comorbidities but in particular severely reduced pulmonary function from presumed pulmonary fibrosis with a DLCO of 24% based on a PFT from 2016.  He is very deconditioned, quite frail and ambulates very minimally.  Although the STS score is only 3%, I do think that his surgical risk would be prohibitively high for multivalvular open heart surgery.  Dr. Cortes also agrees.  We did discuss the possibility of being able to replace his aortic valve via transcatheter approach (TAVR) and will proceed with this workup with a TAVR protocol CT scan as well as a left and right heart catheterization.  Once the studies are completed, we will discuss his case at our upcoming multidisciplinary TAVR conference for group consensus treatment plan.      It was a pleasure to meet Mr. Ramos today.         ROLLY KING MD             D: 2020   T: 2020   MT: little      Name:     PHANI RAMOS   MRN:      [id number]        Account:      [id number]   :      1945           Service Date:  03/16/2020      Document: [id number]

## 2020-04-01 LAB — INTERPRETATION ECG - MUSE: NORMAL

## 2020-04-02 ENCOUNTER — HOSPITAL ENCOUNTER (OUTPATIENT)
Dept: CARDIOLOGY | Facility: CLINIC | Age: 75
End: 2020-04-02
Attending: INTERNAL MEDICINE
Payer: MEDICARE

## 2020-04-02 ENCOUNTER — VIRTUAL VISIT (OUTPATIENT)
Dept: FAMILY MEDICINE | Facility: CLINIC | Age: 75
End: 2020-04-02
Payer: MEDICARE

## 2020-04-02 ENCOUNTER — TELEPHONE (OUTPATIENT)
Dept: FAMILY MEDICINE | Facility: CLINIC | Age: 75
End: 2020-04-02

## 2020-04-02 ENCOUNTER — HOSPITAL ENCOUNTER (OUTPATIENT)
Dept: ULTRASOUND IMAGING | Facility: CLINIC | Age: 75
End: 2020-04-02
Attending: INTERNAL MEDICINE
Payer: MEDICARE

## 2020-04-02 VITALS — SYSTOLIC BLOOD PRESSURE: 107 MMHG | HEART RATE: 88 BPM | DIASTOLIC BLOOD PRESSURE: 92 MMHG

## 2020-04-02 DIAGNOSIS — I35.0 AORTIC STENOSIS: ICD-10-CM

## 2020-04-02 DIAGNOSIS — R09.89 ABNORMAL CHEST SOUNDS: ICD-10-CM

## 2020-04-02 DIAGNOSIS — B02.9 HERPES ZOSTER WITHOUT COMPLICATION: Primary | ICD-10-CM

## 2020-04-02 LAB
CREAT BLD-MCNC: 1 MG/DL (ref 0.66–1.25)
GFR SERPL CREATININE-BSD FRML MDRD: 73 ML/MIN/{1.73_M2}

## 2020-04-02 PROCEDURE — 93880 EXTRACRANIAL BILAT STUDY: CPT | Mod: 26 | Performed by: INTERNAL MEDICINE

## 2020-04-02 PROCEDURE — 74174 CTA ABD&PLVS W/CONTRAST: CPT | Mod: 26 | Performed by: INTERNAL MEDICINE

## 2020-04-02 PROCEDURE — 71275 CT ANGIOGRAPHY CHEST: CPT

## 2020-04-02 PROCEDURE — 82565 ASSAY OF CREATININE: CPT | Performed by: INTERNAL MEDICINE

## 2020-04-02 PROCEDURE — 25000128 H RX IP 250 OP 636: Performed by: INTERNAL MEDICINE

## 2020-04-02 PROCEDURE — 99442 ZZC PHYSICIAN TELEPHONE EVALUATION 11-20 MIN: CPT | Performed by: INTERNAL MEDICINE

## 2020-04-02 PROCEDURE — 71275 CT ANGIOGRAPHY CHEST: CPT | Mod: 26 | Performed by: INTERNAL MEDICINE

## 2020-04-02 PROCEDURE — 93880 EXTRACRANIAL BILAT STUDY: CPT

## 2020-04-02 RX ORDER — IOPAMIDOL 755 MG/ML
200 INJECTION, SOLUTION INTRAVASCULAR ONCE
Status: COMPLETED | OUTPATIENT
Start: 2020-04-02 | End: 2020-04-02

## 2020-04-02 RX ORDER — VALACYCLOVIR HYDROCHLORIDE 1 G/1
1000 TABLET, FILM COATED ORAL 3 TIMES DAILY
Qty: 21 TABLET | Refills: 0 | Status: SHIPPED | OUTPATIENT
Start: 2020-04-02 | End: 2020-04-10

## 2020-04-02 RX ADMIN — IOPAMIDOL 115 ML: 755 INJECTION, SOLUTION INTRAVENOUS at 16:16

## 2020-04-02 ASSESSMENT — PATIENT HEALTH QUESTIONNAIRE - PHQ9: SUM OF ALL RESPONSES TO PHQ QUESTIONS 1-9: 3

## 2020-04-02 NOTE — TELEPHONE ENCOUNTER
PCP,    Pt was at the [facility] heart clinic and the nurse stated the pt had shingles. Would you like telephone visit to discuss before prescribing antiviral/ pain medication?    Thank you,  Odell REYES RN

## 2020-04-02 NOTE — PROGRESS NOTES
Upon start of CT TAVR, pt. Noted to have shingles with the beginning stages of weeping.  Dr. Maurer informed and TAVR nurse Presley along with Dr. Cortes.  Dr. Cortes ordered to proceed with CT TAVR and also carotid ultrasound.  Pt. Instructed to see his primary physician as soon as possible for active shingles

## 2020-04-02 NOTE — PROGRESS NOTES
"Efrem Ramos is a 74 year old male who is being evaluated via a telephone visit.      The patient has been notified of following (by JAMARCUS Herman CMA       \"We have found that certain health care needs can be provided without the need for a physical exam.  This service lets us provide the care you need with a short phone conversation.  If a prescription is necessary we can send it directly to your pharmacy.  If lab work is needed we can place an order for that and you can then stop by our lab to have the test done at a later time.    This telephone visit will be conducted via 3 way call with the you (the patient) , the physician/provider, and a me all on the line at the same time.  This allows your physician/provider to have the phone conversation with you while I will be taking notes for your medical record.  We will have full access to your [facility] medical record during this entire phone call.    Since this is like an office visit,  will bill your insurance company for this service.  Please check with your medical insurance if this type of telephone/virtual is covered . You may be responsible for the cost of this service if insurance coverage is denied.  The typical cost is $30 (10min), $59(11-20min) and $85 (21-30min)     If during the course of the call the physician/provider feels a telephone visit is not appropriate, you will not be charged for this service\"    Consent has been obtained for this service by care team member: yes.  See the scanned image in the medical record.    S: The history as provided by the patient to the provider during this 3 way call include:             Pertinent parts of the the patient's medical history reviewed and confirmed by the provider included : Probable shingles    Very pleasant 74-year-old man with multiple medical problems including coronary artery disease, atrial fibrillation, valvular heart disease, history of stroke.  He is a former smoker.  He is currently " being evaluated for possible heart valve replacement.  Part of his evaluation included a CT scan today.  When he removed his shirt for the CT scan, staff at the radiology suite noted a rash concerning for shingles.  The patient and his wife describe a mildly tender, itchy rash in a linear distribution across his chest and back.  There are no other systemic symptoms such as fevers, chills, weight loss.  His wife states that he has been feeling less energy than usual lately but she attributes that to his valvular heart disease.  In his recollection, he thinks the rash may have been present for several days.    Total time of call between patient and provider was 13 minutes     Adria Mishra (MD signature)  ===================================================    I have reviewed the note as documented above.  This accurately captures the substance of my conversation with the patient,      Assessment/Plan:    Herpes zoster without complication    This is most likely shingles, but without an examination, this cannot be confirmed.  Given that the treatment for shingles is relatively benign, and also that there is risk of potential exposure to the novel coronavirus associated with coming in for a clinical evaluation of the rash, I believe the risk-benefit ratio favors treating for putative herpes zoster eruption.  The pathophysiology of shingles as well as the treatment was discussed with the patient and his wife in detail.  I advised starting valacyclovir as soon as possible.  Since symptoms associated with the rash are currently quite mild, I advised using Tylenol as directed to help with any associated discomfort.  He was advised that pain could become more severe, and that if he has pain that is not controlled by Tylenol, he is advised to call back to discuss more potent analgesics such as gabapentin or opioids.  The time course to recovery can be very variable and this was discussed with the patient and his  wife.

## 2020-04-02 NOTE — TELEPHONE ENCOUNTER
Reason for call:  Patient reporting a symptom    Symptom or request: pain in back, shingles    Duration (how long have symptoms been present): 2-3 days    Have you been treated for this before? No    Additional comments: had an appointment at the [facility] and the nurse there informed him he has shingles, what medication for pain?    Phone Number patient can be reached at:  Home number on file [phone] (home)    Best Time:  Today 4/2/2020 after 4 p.m.    Can we leave a detailed message on this number:  YES    Call taken on 4/2/2020 at 2:38 PM by Arti Jeffery

## 2020-04-03 ENCOUNTER — VIRTUAL VISIT (OUTPATIENT)
Dept: CARDIOLOGY | Facility: CLINIC | Age: 75
End: 2020-04-03
Attending: INTERNAL MEDICINE
Payer: MEDICARE

## 2020-04-03 DIAGNOSIS — R06.09 DOE (DYSPNEA ON EXERTION): ICD-10-CM

## 2020-04-03 DIAGNOSIS — I35.0 AORTIC VALVE STENOSIS, ETIOLOGY OF CARDIAC VALVE DISEASE UNSPECIFIED: ICD-10-CM

## 2020-04-03 DIAGNOSIS — I10 BENIGN ESSENTIAL HYPERTENSION: ICD-10-CM

## 2020-04-03 DIAGNOSIS — I25.10 CORONARY ARTERY DISEASE INVOLVING NATIVE CORONARY ARTERY OF NATIVE HEART WITHOUT ANGINA PECTORIS: Primary | ICD-10-CM

## 2020-04-03 DIAGNOSIS — I48.91 ATRIAL FIBRILLATION AND FLUTTER (H): ICD-10-CM

## 2020-04-03 DIAGNOSIS — I48.92 ATRIAL FIBRILLATION AND FLUTTER (H): ICD-10-CM

## 2020-04-03 DIAGNOSIS — E78.2 MIXED HYPERLIPIDEMIA: ICD-10-CM

## 2020-04-03 PROCEDURE — 99442 ZZC PHYSICIAN TELEPHONE EVALUATION 11-20 MIN: CPT | Performed by: PHYSICIAN ASSISTANT

## 2020-04-03 NOTE — PROGRESS NOTES
"Efrem Ramos is a 74 year old male who is being evaluated via a billable telephone visit.      The patient has been notified of following:     \"This telephone visit will be conducted via a call between you and your physician/provider. We have found that certain health care needs can be provided without the need for a physical exam.  This service lets us provide the care you need with a short phone conversation.  If a prescription is necessary we can send it directly to your pharmacy.  If lab work is needed we can place an order for that and you can then stop by our lab to have the test done at a later time.    If during the course of the call the physician/provider feels a telephone visit is not appropriate, you will not be charged for this service.\"     Patient has given verbal consent for Telephone visit? Yes.     Efrem Ramos complains of    Chief Complaint   Patient presents with     FU After Angiogram       I have reviewed and updated the patient's Past Medical History, Social History, Family History and Medication List.    ALLERGIES  Sulfa drugs; Adhesive tape; Amiodarone; and Penicillins     Unable to obtain BP and P.  Current weiight per patient 165lb.  Patient reports currently has shingles.    Emily Koch LPN    -----------------------------------------------------------------------------------------------------------    Primary Cardiologist: Dr. Xiong (general cardiology)/Dr. 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    He was seen in the structural heart clinic recently for symptoms of worsening dyspnea on exertion.  He was initiated on work-up for transcatheter aortic valve replacement.  He underwent left and right heart catheterization.  His right heart catheterization showed normal right and left heart pressures and normal cardiac output and index.  He did have one-vessel coronary artery disease with 60% mid RCA lesion with IFR R of 0.97 and 60 to 70% distal RCA lesion with IFR of 0.89 (this was in the setting of suboptimally controlled heart rate).  Given his suboptimal heart rates his metoprolol was increased from 12.5 mg to 25 mg daily.  Revascularization of the distal RCA lesion was deferred after discussing the case with Dr. Palmer.  He wanted to further evaluate and see what the results of the patient's other tests would show.  Angiogram also revealed significant peripheral arterial disease with likely significant obstructive disease in the right common iliac artery.  Given his right heart catheterization results it was felt that his shortness of breath could be more lung related.    CT demonstrated fibrotic and emphysematous abnormalities in the lungs.  There appears to be a prior wedge resection in the right lung.  No effusion or infiltration is noted.  CT angiogram portion of the study showed no acute aortic dissection, intramural hematoma, or contained rupture.    I spoke with Alfredo and his wife on the phone today.  He denies any type of groin discomfort or numbness/tingling down to his leg.  He continues to have dyspnea on exertion.  He denies chest discomfort,  palpitations, PND, orthopnea.  They do not have a blood pressure machine so he is not sure his blood pressure is.  He is tolerating the increased dose of metoprolol.  They do have a pulse oximetry but they have not checked his pulse.  He continues to have mild peripheral edema but denies orthopnea, PND, abdominal distention, or significant weight gain (though he tells me he does not check his weight regularly).  He denies any bleeding issues.    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       I have reviewed the results of the coronary angiogram as well as a right heart catheterization with Alfredo today.  He does not have any symptoms to suggest angina.  He continues to have dyspnea on exertion.  He is tolerating the increased dose of metoprolol but were not sure if his heart rates are controlled.  He did check his pulse oximetry today and it showed that his heart rate was 111 bpm.  He did admit that he was quite anxious about the phone visit today.  We ultimately decided to have him check it daily and schedule a phone visit with me in 2 weeks.  At that time we will review his numbers and see if he needs further increase in metoprolol.    It is possible that his shortness  of breath could be multifactorial in etiology.  He does have emphysematous changes noted on his recent chest CT.  He is not on any inhalers.    I also discussed with him that given the COVID 19 pandemic all elective procedures are on hold at this time.  He will get further updates from our valve care coordinators.  Both he and his wife verbalized understanding.    Thank you for allowing me to participate in the care of this pleasant patient today.    Call Duration: 14 minutes    This visit is being conducted as a virtual visit due to the emphasis on mitigation of the COVID-19 virus pandemic. The clinician has decided that the risk of an in-office visit outweighs the benefit for this patient.     Garcia Vincent PA-C   4/3/2020  Pager: [phone]

## 2020-04-06 DIAGNOSIS — G25.81 RESTLESS LEGS SYNDROME (RLS): ICD-10-CM

## 2020-04-06 NOTE — TELEPHONE ENCOUNTER
Received faxed request from [facility] [address] [facility], pending RX for Gabapentin 300 mg capsules, DISP:  60 capsules, SIG:  Take 2 capsules (600mg) by mouth every evening.  REFILL     Last Written Prescription Date: 10/14/2019  Last Fill Quantity: 60   # refills:5  Last office visit:  8/14/2019  Future Office visit: none scheduled      Route to Dr. Pacheco for his review.   Next 5 appointments (look out 90 days)    Apr 17, 2020  1:10 PM CDT  Telephone Visit with Garcia Vincent PA-C  [facility] ([facility]) [address] [place] [id number] [phone] OPT 2           Routing refill request to provider for review/approval because:  Drug not on the [facility] or [facility] refill protocol or controlled substance

## 2020-04-07 RX ORDER — GABAPENTIN 300 MG/1
600 CAPSULE ORAL EVERY EVENING
Qty: 60 CAPSULE | Refills: 5 | Status: SHIPPED | OUTPATIENT
Start: 2020-04-09 | End: 2020-07-09

## 2020-04-09 DIAGNOSIS — B02.9 HERPES ZOSTER WITHOUT COMPLICATION: ICD-10-CM

## 2020-04-09 NOTE — TELEPHONE ENCOUNTER
Dr Mishra/DOD:   Please advise on further refill    MyChart response from spouse:   Thank you for responding.  This is his wife (Mariajose.)  Yes, he still has them.  He says they are a little better but still pretty painful & the rash is still bad.  He just told me he will be out of meds after tonight.  I just don't want him to have to start over with treatment if he doesn't have to.

## 2020-04-09 NOTE — TELEPHONE ENCOUNTER
"Rx'd 1x for shingles  Sent MyChart to pt  Hermelinda GRAHAM RN    Last Written Prescription Date:  4/2/2020  Last Fill Quantity: 21,  # refills: 0   Last office visit: 4/2/2020 with prescribing provider:     Future Office Visit:   Next 5 appointments (look out 90 days)    Apr 17, 2020  1:10 PM CDT  Telephone Visit with Garcia Vincent PA-C  [facility] ([facility]) [address] [place] [phone] OPT 2   May 15, 2020  2:45 PM CDT  Telephone Visit with Severo Xiong MD  [facility] ([facility]) [address] [place] [phone] OPT 2         Requested Prescriptions   Pending Prescriptions Disp Refills     valACYclovir (VALTREX) 1000 mg tablet [Pharmacy Med Name: VALACYCLOVIR 1GM TABLETS] 21 tablet 0     Sig: TAKE 1 TABLET(1000 MG) BY MOUTH THREE TIMES DAILY FOR 7 DAYS       Antivirals for Herpes Protocol Passed - 4/9/2020 12:59 PM        Passed - Patient is age 12 or older        Passed - Recent (12 mo) or future (30 days) visit within the authorizing provider's specialty     Patient has had an office visit with the authorizing provider or a provider within the authorizing providers department within the previous 12 mos or has a future within next 30 days. See \"Patient Info\" tab in inbasket, or \"Choose Columns\" in Meds & Orders section of the refill encounter.              Passed - Medication is active on med list        Passed - Normal serum creatinine on file in past 12 months     Recent Labs   Lab Test 04/02/20  1309 03/23/20  0847   CR  --  0.97   CREAT 1.0  --        Ok to refill medication if creatinine is low             "

## 2020-04-10 RX ORDER — VALACYCLOVIR HYDROCHLORIDE 1 G/1
TABLET, FILM COATED ORAL
Qty: 21 TABLET | Refills: 0 | Status: ON HOLD | OUTPATIENT
Start: 2020-04-10 | End: 2020-06-04

## 2020-04-16 ENCOUNTER — CARE COORDINATION (OUTPATIENT)
Dept: CARDIOLOGY | Facility: CLINIC | Age: 75
End: 2020-04-16

## 2020-04-16 NOTE — PROGRESS NOTES
Patient was presented this morning at the multidisciplinary TAVR conference..   Plan is to proceed with TAVR procedure.    Patient will need RCA intervention prior to TAVR procedure.  PFT also are needed, but due to COVID-19 it has been difficult to get him scheduled.    Valve: [facility]  Approach: We are going to upload his CT images to SHOCKWAVE rep along with [employer] rep to determine if shockwave would be doable due to all the calcium in his femoral arteries.  [place]: No, due to inability to access right radial artery.  Sedation: Hopefully MAC due to h/o pulmonary fibrosis    Above information was called out to the patient.  There was no answer.  I left a VM instructing him to call back.

## 2020-04-17 ENCOUNTER — VIRTUAL VISIT (OUTPATIENT)
Dept: CARDIOLOGY | Facility: CLINIC | Age: 75
End: 2020-04-17
Attending: PHYSICIAN ASSISTANT
Payer: MEDICARE

## 2020-04-17 VITALS — WEIGHT: 158 LBS | BODY MASS INDEX: 24.02 KG/M2 | HEART RATE: 133 BPM

## 2020-04-17 DIAGNOSIS — R06.09 DOE (DYSPNEA ON EXERTION): ICD-10-CM

## 2020-04-17 DIAGNOSIS — E78.2 MIXED HYPERLIPIDEMIA: ICD-10-CM

## 2020-04-17 DIAGNOSIS — I35.0 AORTIC VALVE STENOSIS, ETIOLOGY OF CARDIAC VALVE DISEASE UNSPECIFIED: ICD-10-CM

## 2020-04-17 DIAGNOSIS — I10 BENIGN ESSENTIAL HYPERTENSION: ICD-10-CM

## 2020-04-17 DIAGNOSIS — I48.91 ATRIAL FIBRILLATION AND FLUTTER (H): Primary | ICD-10-CM

## 2020-04-17 DIAGNOSIS — I48.92 ATRIAL FIBRILLATION AND FLUTTER (H): Primary | ICD-10-CM

## 2020-04-17 DIAGNOSIS — I25.10 CORONARY ARTERY DISEASE INVOLVING NATIVE CORONARY ARTERY OF NATIVE HEART WITHOUT ANGINA PECTORIS: ICD-10-CM

## 2020-04-17 PROCEDURE — 99443 ZZC PHYSICIAN TELEPHONE EVALUATION 21-30 MIN: CPT | Performed by: PHYSICIAN ASSISTANT

## 2020-04-17 NOTE — PROGRESS NOTES
"Efrem Ramos is a 74 year old male who is being evaluated via a billable telephone visit.      The patient has been notified of following:     \"This telephone visit will be conducted via a call between you and your physician/provider. We have found that certain health care needs can be provided without the need for a physical exam.  This service lets us provide the care you need with a short phone conversation.  If a prescription is necessary we can send it directly to your pharmacy.  If lab work is needed we can place an order for that and you can then stop by our lab to have the test done at a later time.    Telephone visits are billed at different rates depending on your insurance coverage. During this emergency period, for some insurers they may be billed the same as an in-person visit.  Please reach out to your insurance provider with any questions.    If during the course of the call the physician/provider feels a telephone visit is not appropriate, you will not be charged for this service.\"    Patient has given verbal consent for Telephone visit?  Yes    How would you like to obtain your AVS? Mail a copy     Vital signs reported by patient  BP. Patient unable to check due to broken bp machine  P.  133  Wt.  158 lb  O2. 95-98%  [facility]    Please call Patient at [phone]       -----------------------------------------------------------------------------------------------------------     Primary Cardiologist: Dr. Xiong (general cardiology)/Dr. 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     He was seen in the structural heart clinic recently for symptoms of worsening dyspnea on exertion.  He was initiated on work-up for transcatheter aortic valve replacement.  He underwent left and right heart catheterization.  His right heart catheterization showed normal right and left heart pressures and normal cardiac output and index.  He did have one-vessel coronary artery disease with 60% mid RCA lesion with IFR R of 0.97 and 60 to 70% distal RCA lesion with IFR of 0.89 (this was in the setting of suboptimally controlled heart rate).  Given his suboptimal heart rates his metoprolol was increased from 12.5 mg to 25 mg daily.  Revascularization of the distal RCA lesion was deferred after discussing the case with Dr. Swenson.  He wanted to further evaluate and see what the results of the patient's other tests would show.  Angiogram also revealed significant peripheral arterial disease with likely significant obstructive disease in the right common iliac artery.  Given his right heart catheterization results it was felt that his shortness of breath could be more lung related.     CT demonstrated fibrotic and emphysematous abnormalities in the lungs.  There appears to be a prior wedge resection in the right lung.  No effusion or infiltration is noted.  CT angiogram portion of the study showed no acute aortic dissection, intramural hematoma, or contained rupture.     I met him during last visit which was for post angiogram follow up. At that time he had elevated heart rates and we decided to  monitor this further as he was not sure if they were elevated other times or just elevated on the day of the phone visit as he was feeling a bit anxious.     He reports he continues to have HIGH and lightheadedness when he stands up too fast. He has checked his pulse everyday and it ranges from 86 to 121. It is even higher today at 133. He does not feel more lightheaded or any palpitations. He does SOB but this seems to be about the same for the last 4-6 weeks. He is not able to check his BP as his machine is broken. He denies presyncope or syncope.      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        It appears that his heart rates are still suboptimal. He takes his metoprolol in the evening. I asked him to start taking additional half tablet (12.5 mg) in the morning as well to see if we can lower his HR's a bit more. I will have TAVR team contact him on Monday to see how he is doing on this dose.     His case was discussed at the TAVR meeting yesterday and he will be scheduled for TAVR with possible shockwave therapy given significant calcification in the femoral tree. Given his significant symptoms of HIGH that is  progressive, he would be a good candidate to have TAVR in the near future. He missed TAVR team's call yesterday; I instructed him to call them back for more details.     He is instructed to call 911 if his heart rates get higher and he feels lightheaded or presyncopal. Both he and his wife verbalized understanding.      Thank you for allowing me to participate in the care of this pleasant patient today.     Call Duration: 23 minutes     This visit is being conducted as a virtual visit due to the emphasis on mitigation of the COVID-19 virus pandemic. The clinician has decided that the risk of an in-office visit outweighs the benefit for this patient.      Garcia Vincent PA-C   4/17/2020  Pager: [phone]

## 2020-04-23 ENCOUNTER — CARE COORDINATION (OUTPATIENT)
Dept: CARDIOLOGY | Facility: CLINIC | Age: 75
End: 2020-04-23

## 2020-04-23 NOTE — PROGRESS NOTES
Garcia had recent phone call to patient.  Metoprolol 12.5 mg  AM was.  He is currently taking it in the PM.  I talked with patient's wife who stated he feels about the same and his heart rate is better, she thought more in the 90's.  She was going to rechecks his heart rate and then get back to me.  I will inform Garcia.  He will need PFT's, but due to COVID-19 they are not bringing any patient into clinic.  He will also need RCA intervention prior to TAVR.

## 2020-04-24 ENCOUNTER — CARE COORDINATION (OUTPATIENT)
Dept: CARDIOLOGY | Facility: CLINIC | Age: 75
End: 2020-04-24

## 2020-04-24 NOTE — PROGRESS NOTES
Received a call from Mariajose and Efrem' HR has been running anywhere from 74 bpm to 100 bpm. She wanted to make sure we had the numbers and I will pass them on the Lupe. She also had questions as to when we thought the Pulmonologist office would be able to get him for PFT's I told her we would follow up with her next week.Presley Lisa RN

## 2020-04-29 ENCOUNTER — CARE COORDINATION (OUTPATIENT)
Dept: CARDIOLOGY | Facility: CLINIC | Age: 75
End: 2020-04-29

## 2020-04-29 RX ORDER — ZOLEDRONIC ACID 5 MG/100ML
5 INJECTION, SOLUTION INTRAVENOUS ONCE
Status: CANCELLED
Start: 2020-04-29

## 2020-05-01 ENCOUNTER — CARE COORDINATION (OUTPATIENT)
Dept: CARDIOLOGY | Facility: CLINIC | Age: 75
End: 2020-05-01

## 2020-05-01 NOTE — PROGRESS NOTES
I have called patient and left VM x 2 to have him call back with update on how he is feeling.  Plan is to continue with current dose of beta blocker since heart rate better.  Wondering how his shingles is healing.  Once this heals our plan is intervention on right coronary artery prior to TAVR procedure.

## 2020-05-08 ENCOUNTER — CARE COORDINATION (OUTPATIENT)
Dept: CARDIOLOGY | Facility: CLINIC | Age: 75
End: 2020-05-08

## 2020-05-08 DIAGNOSIS — Z11.59 ENCOUNTER FOR SCREENING FOR OTHER VIRAL DISEASES: Primary | ICD-10-CM

## 2020-05-08 DIAGNOSIS — I25.10 CORONARY ARTERY DISEASE INVOLVING NATIVE CORONARY ARTERY OF NATIVE HEART WITHOUT ANGINA PECTORIS: Primary | ICD-10-CM

## 2020-05-08 NOTE — PROGRESS NOTES
Patient needs to have RCA intervention prior to TAVR.  His shingles is all dry, no evidence of skin break down or open areas per wife.  Plan is for SHAWN visit for H&P and then schedule for angiogram with Dr. Cortes on 5/15/20.

## 2020-05-13 ENCOUNTER — VIRTUAL VISIT (OUTPATIENT)
Dept: CARDIOLOGY | Facility: CLINIC | Age: 75
End: 2020-05-13
Attending: INTERNAL MEDICINE
Payer: MEDICARE

## 2020-05-13 VITALS
DIASTOLIC BLOOD PRESSURE: 81 MMHG | HEART RATE: 80 BPM | WEIGHT: 163 LBS | BODY MASS INDEX: 24.78 KG/M2 | SYSTOLIC BLOOD PRESSURE: 99 MMHG

## 2020-05-13 DIAGNOSIS — I35.0 AORTIC VALVE STENOSIS, ETIOLOGY OF CARDIAC VALVE DISEASE UNSPECIFIED: ICD-10-CM

## 2020-05-13 DIAGNOSIS — I25.10 CORONARY ARTERY DISEASE INVOLVING NATIVE CORONARY ARTERY OF NATIVE HEART WITHOUT ANGINA PECTORIS: Primary | ICD-10-CM

## 2020-05-13 DIAGNOSIS — I25.119 CORONARY ARTERY DISEASE INVOLVING NATIVE CORONARY ARTERY OF NATIVE HEART WITH ANGINA PECTORIS (H): Primary | ICD-10-CM

## 2020-05-13 PROCEDURE — 99443: CPT | Performed by: INTERNAL MEDICINE

## 2020-05-13 RX ORDER — METOPROLOL SUCCINATE 25 MG/1
37.5 TABLET, EXTENDED RELEASE ORAL
Status: ON HOLD | COMMUNITY
End: 2020-06-04

## 2020-05-13 RX ORDER — ISOSORBIDE MONONITRATE 30 MG/1
15 TABLET, EXTENDED RELEASE ORAL DAILY
Qty: 30 TABLET | Refills: 1 | Status: ON HOLD | OUTPATIENT
Start: 2020-05-13 | End: 2020-06-04

## 2020-05-13 RX ORDER — POTASSIUM CHLORIDE 1500 MG/1
20 TABLET, EXTENDED RELEASE ORAL
Status: CANCELLED | OUTPATIENT
Start: 2020-05-13

## 2020-05-13 RX ORDER — SODIUM CHLORIDE 9 MG/ML
INJECTION, SOLUTION INTRAVENOUS CONTINUOUS
Status: CANCELLED | OUTPATIENT
Start: 2020-05-13

## 2020-05-13 RX ORDER — LIDOCAINE 40 MG/G
CREAM TOPICAL
Status: CANCELLED | OUTPATIENT
Start: 2020-05-13

## 2020-05-13 NOTE — PROGRESS NOTES
"Efrem Ramos is a 74 year old male who is being evaluated via a billable telephone visit.      The patient has been notified of following:     \"This telephone visit will be conducted via a call between you and your physician/provider. We have found that certain health care needs can be provided without the need for a physical exam.  This service lets us provide the care you need with a short phone conversation.  If a prescription is necessary we can send it directly to your pharmacy.  If lab work is needed we can place an order for that and you can then stop by our lab to have the test done at a later time.    Telephone visits are billed at different rates depending on your insurance coverage. During this emergency period, for some insurers they may be billed the same as an in-person visit.  Please reach out to your insurance provider with any questions.    If during the course of the call the physician/provider feels a telephone visit is not appropriate, you will not be charged for this service.\"    Patient has given verbal consent for Telephone visit? Yes   What phone number would you like to be contacted at? [phone]  How would you like to obtain your AVS? MyChart     Review Of Systems  Skin: NEGATIVE  Eyes:Ears/Nose/Throat: NEGATIVE  Respiratory: HIGH, with some activity  Cardiovascular: at rest \"needle feeling\" under left breast,twinge off and on for 1/2 hr,then goes away. A few times a week  Gastrointestinal: NEGATIVE  Genitourinary:NEGATIVE   Musculoskeletal: NEGATIVE  Neurologic: NEGATIVE  Psychiatric: NEGATIVE  Hematologic/Lymphatic/Immunologic: NEGATIVE  Endocrine:  NEGATIVE    Vitals: Pt reports Pulse 80 BP 99/81  Weight 163lbs    Phone call duration: 30 minutes    Jess Dinero LPN    "

## 2020-05-13 NOTE — PROGRESS NOTES
Called patient with Pre cath instructions:      Contrast allergy: no  Anticoagulation: yes, has been instructed to hold Eliquis x 48 hrs prior to procedure.   Metformin: no  Oral DM meds: no  Insulin: no (will review with PMD)  Diuretic: yes, Lasix (will hold)  Use of phosphodiesterase type 5 inhibitor: no  Aspirin: yes, will continue 81 mg daily.  Pt informed to be NPO at midnight  Renal issues: no  Pt has transportation and 24 hours post procedure monitoring set up.   Pt aware of no driving for 24 hours post procedure.   Pt. and wife aware he can not have visitors.     Pt aware of arrival time and location. Pt verbalized understanding of instructions.   All questions answered.    He is aware of COVID-19 testing prior to procedure.    Wellness Screening Tool    Symptom Screening:    Do you have one of the following new symptoms:      Fever or reported chills?   No    A new cough (started within the past 14 days)? No    Shortness of breath (started within the past 14 days)? No    Nausea, vomiting or diarrhea? No    Within the past 3 weeks, have you been exposed to someone with a known positive illness below?      COVID - 19 (known or suspected)  no    Chicken pox?   no.  Patient had shingles weeks ago.  All areas are healed.  No areas of breakdown in his skin according to his wife.    Measles? no    Pertussis?  No    Patient notified of visitor restriction: Yes   Patient informed to wear a mask: YES

## 2020-05-13 NOTE — LETTER
5/13/2020      RE: Efrem Ramos  [address]  [facility] [place] [address]       Dear Colleague,    Thank you for the opportunity to participate in the care of your patient, Efrem Ramos, at the [facility]. Please see a copy of my visit note below.    Today, I had the pleasure of connecting with Efrem Ramos for a follow-up visit.  He is a pleasant 74 year old patient with single-vessel coronary disease pending intervention to RCA, atrial fibrillation with high chads Vasc and prior CVA, obstructive sleep apnea, dyslipidemia, hypertension and and valvular disease in the form of severe aortic stenosis mild to moderate mitral stenosis whom I reached out today for a follow-up.  I refer the patient earlier to the TAVR clinic for aortic valve replacement.  He underwent a coronary angiogram as part of work-up which showed severe RCA disease.  Right heart cath showed normal left and right-sided and pulmonary pressures.  However because he was not decided whether surgical or percutaneous approach for aortic valve replacement would be adopted RCA was not intervened on.  He then underwent a CT TAVR protocol which showed favorable anatomy for TAVR.  He is currently scheduled to undergo PCI of RCA on 5/16/2020.  After he recovers from this transcatheter aortic valve placement will be performed at a later date.    Today the patient is accompanied by his wife on phone.  He tells me that he has been experiencing a left-sided, sharp, left inframammary pain almost every day which occurs at rest and lasts for several minutes before resolving spontaneously.  He has also noticed this times when walking the stairs.  He continues to have shortness of breath is chronic and multifactorial due to multiple lung and cardiac issues.  Shortness of breath has not changed.  He denies orthopnea, dizziness, lightheadedness, presyncope or  syncope.    Assessment:  1.  Coronary disease-pending intervention to RCA  2.  Severe aortic stenosis-pending TAVR and   3.  Amiodarone induced lung toxicity  4.  History of ischemic CVA  5.  Obstructive sleep apnea  6.  Mild-moderate mitral stenosis, moderate to severe tricuspid regurgitation  7.  Hypertension  8.  Chronic atrial fibrillation on anticoagulation  9.  Peripheral arterial disease- rt superficial femoral artery not amenable to PCI  10.  Prior tobacco abuse    The patient is scheduled to undergo PCI to RCA on Monday.  I instructed him to hold anticoagulation starting Friday night.  He will continue rest of his medications unchanged.  He reported episodes of left-sided chest pain which is suggestive of angina.  I will add low-dose of Imdur.  I am expecting this to be a temporary measure as fixing RCA should improve his symptoms. I believe will be able to take him off Imdur at a later date after RCA intervention and TAVR.    Plan:   1.  Scheduled for PCI to RCA  2.  Plan to perform TAVR at a later date  3.  Start Imdur 15 mg p.o. daily.  4.  Do not take anticoagulation after Friday night.  5.  Stay well-hydrated before angiogram    Thank you for allowing me to participate in the care of Efrem Ramos    This note was completed in part using Dragon voice recognition software. Although reviewed after completion, some word and grammatical errors may occur.    Severo Xiong MD  Cardiology    No orders of the defined types were placed in this encounter.      Orders Placed This Encounter   Medications     metoprolol succinate ER (TOPROL-XL) 25 MG 24 hr tablet     Sig: Take 37.5 mg by mouth 1/2 tablet in am , and 1 tablet in pm       Medications Discontinued During This Encounter   Medication Reason     metoprolol succinate ER (TOPROL-XL) 25 MG 24 hr tablet Dose adjustment       Encounter Diagnoses   Name Primary?     Coronary artery disease involving native coronary artery of native heart with angina  pectoris (H) Yes     Aortic valve stenosis, etiology of cardiac valve disease unspecified        CURRENT MEDICATIONS:  Current Outpatient Medications   Medication Sig Dispense Refill     acetaminophen (TYLENOL) 325 MG tablet Take 325-650 mg by mouth every 6 hours as needed for mild pain       apixaban ANTICOAGULANT (ELIQUIS) 5 MG tablet Take 1 tablet (5 mg) by mouth 2 times daily 180 tablet 3     ASPIRIN PO Take 81 mg by mouth every evening        atorvastatin (LIPITOR) 40 MG tablet Take 1 tablet (40 mg) by mouth daily 90 tablet 3     furosemide (LASIX) 20 MG tablet Take 1 tablet (20 mg) by mouth daily 90 tablet 0     gabapentin (NEURONTIN) 300 MG capsule Take 2 capsules (600 mg) by mouth every evening 60 capsule 5     loperamide (IMODIUM) 2 MG capsule Take 2 mg by mouth 4 times daily as needed for diarrhea       melatonin 1 MG TABS tablet Take 1-3 mg by mouth nightly as needed for sleep       metoprolol succinate ER (TOPROL-XL) 25 MG 24 hr tablet Take 37.5 mg by mouth 1/2 tablet in am , and 1 tablet in pm       multivitamin (OCUVITE) TABS Take 1 tablet by mouth 2 times daily        polyethylene glycol (MIRALAX/GLYCOLAX) powder Take 1 capful by mouth daily as needed for constipation       potassium chloride ER 20 MEQ PO CR tablet Take 1 tablet (20 mEq) by mouth daily 90 tablet 3     calcium carbonate 600 mg-vitamin D 400 units (CALTRATE) 600-400 MG-UNIT per tablet Take 1 tablet by mouth 2 times daily       Cyanocobalamin 2000 MCG TABS Take 2,000 mcg by mouth every 7 days On Sundays       valACYclovir (VALTREX) 1000 mg tablet TAKE 1 TABLET(1000 MG) BY MOUTH THREE TIMES DAILY FOR 7 DAYS 21 tablet 0       ALLERGIES     Allergies   Allergen Reactions     Sulfa Drugs Difficulty breathing     Adhesive Tape Blisters     Amiodarone Other (See Comments)     Developed pleural effusion     Penicillins Unknown     Reaction occurred as a child       PAST MEDICAL HISTORY:  Past Medical History:   Diagnosis Date     Atrial  fibrillation (H)      Benign essential hypertension 11/20/2018     Cancer (H) vocal cord     Carpal tunnel syndrome     abstracted 7/3/02.     CVA (cerebral infarction) 5/5/2015     Demyelinating disease of central nervous system, unspecified (H)     abstracted 7/3/02.     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     Pneumonia 8/17/2016     PVD (peripheral vascular disease) (H)      Redundant colon     needs CT colonography     S/P coronary angiogram 09/05/2017     Shingles      SKIN DISORDERS NEC 3/15/2005     Sleep apnea        PAST SURGICAL HISTORY:  Past Surgical History:   Procedure Laterality Date     BIOPSY  brain 2002     BONE MARROW BIOPSY, BONE SPECIMEN, NEEDLE/TROCAR N/A 6/8/2017    Procedure: BIOPSY BONE MARROW;  UNILATERAL BONE MARROW BIOPSY (CONSCIOUS SEDATION) ;  Surgeon: Jamie Gonzales MD;  Location:  GI     C NONSPECIFIC PROCEDURE  as a child    T & A. abstracted 7/3/02.     C NONSPECIFIC PROCEDURE  early    CTR. abstracted 7/3/02.     CARDIAC CATHERIZATION  09/05/2017    2V CAD, IFR of RCA 0.95     CV CORONARY ANGIOGRAM N/A 3/23/2020    Procedure: Coronary Angiogram and right heart cath;  Surgeon: Gino Ferrer MD;  Location: [facility] CARDIAC CATH LAB     CV INSTANTANEOUS WAVE-FREE RATIO N/A 3/23/2020    Procedure: Instantaneous Wave-Free Ratio;  Surgeon: Gino Ferrer MD;  Location: [facility] CARDIAC CATH LAB     ESOPHAGOSCOPY, GASTROSCOPY, DUODENOSCOPY (EGD), COMBINED N/A 9/8/2018    Procedure: COMBINED ESOPHAGOSCOPY, GASTROSCOPY, DUODENOSCOPY  (EGD), BIOPSY SINGLE OR MULTIPLE;;  Surgeon: Xander Marie MD;  Location:  GI     HEAD & NECK SURGERY       ORTHOPEDIC SURGERY      right arm ulna reset after injury     THORACOSCOPIC WEDGE RESECTION LUNG Right 2016    Procedure: THORACOSCOPIC WEDGE RESECTION LUNG;  Surgeon: Abdelrahman Noriega MD;  Location:  OR       FAMILY HISTORY:  Family History   Problem Relation Age of Onset     Blood Disease Mother         Anemia     Cardiovascular Father      Cancer - colorectal Maternal Grandfather      Hypertension Brother      Diabetes Sister 5        Juvinile Diabetes passed at 36     Respiratory Other         Lung Cancer       SOCIAL HISTORY:  Social History     Socioeconomic History     Marital status:      Spouse name: None     Number of children: None     Years of education: None     Highest education level: None   Occupational History     None   Social Needs     Financial resource strain: None     Food insecurity     Worry: None     Inability: None     Transportation needs     Medical: None     Non-medical: None   Tobacco Use     Smoking status: Former Smoker     Packs/day: 1.00     Years: 30.00     Pack years: 30.00     Types: Cigarettes     Last attempt to quit: 2013     Years since quittin.8     Smokeless tobacco: Never Used   Substance and Sexual Activity     Alcohol use: Yes     Alcohol/week: 42.0 standard drinks     Types: 42 Standard drinks or equivalent per week     Comment: occ     Drug use: No     Sexual activity: Not Currently   Lifestyle     Physical activity     Days per week: None     Minutes per session: None     Stress: None   Relationships     Social connections     Talks on phone: None     Gets together: None     Attends Yarsanism service: None     Active member of club or organization: None     Attends meetings of clubs or organizations: None     Relationship status: None     Intimate partner violence     Fear of current or ex partner: None     Emotionally  abused: None     Physically abused: None     Forced sexual activity: None   Other Topics Concern     Parent/sibling w/ CABG, MI or angioplasty before 65F 55M? Not Asked      Service Not Asked     Blood Transfusions Not Asked     Caffeine Concern Yes     Comment: 1 Coke occasionally      Occupational Exposure Not Asked     Hobby Hazards Not Asked     Sleep Concern Not Asked     Stress Concern Not Asked     Weight Concern Not Asked     Special Diet No     Back Care Not Asked     Exercise No     Bike Helmet Not Asked     Seat Belt Not Asked     Self-Exams Not Asked   Social History Narrative    Retired (disability)      Please do not hesitate to contact me if you have any questions/concerns.     Sincerely,     Severo Xiong MD

## 2020-05-14 NOTE — PROGRESS NOTES
"Efrem Ramos is a 74 year old male who is being evaluated via a billable telephone visit.      The patient has been notified of following:     \"This telephone visit will be conducted via a call between you and your physician/provider. We have found that certain health care needs can be provided without the need for a physical exam.  This service lets us provide the care you need with a short phone conversation.  If a prescription is necessary we can send it directly to your pharmacy.  If lab work is needed we can place an order for that and you can then stop by our lab to have the test done at a later time.    Telephone visits are billed at different rates depending on your insurance coverage. During this emergency period, for some insurers they may be billed the same as an in-person visit.  Please reach out to your insurance provider with any questions.    If during the course of the call the physician/provider feels a telephone visit is not appropriate, you will not be charged for this service.\"    Patient has given verbal consent for Telephone visit? Yes   What phone number would you like to be contacted at? [phone]  How would you like to obtain your AVS? MyChart     Review Of Systems  Skin: NEGATIVE  Eyes:Ears/Nose/Throat: NEGATIVE  Respiratory: HIGH, with some activity  Cardiovascular: at rest \"needle feeling\" under left breast,twinge off and on for 1/2 hr,then goes away. A few times a week  Gastrointestinal: NEGATIVE  Genitourinary:NEGATIVE   Musculoskeletal: NEGATIVE  Neurologic: NEGATIVE  Psychiatric: NEGATIVE  Hematologic/Lymphatic/Immunologic: NEGATIVE  Endocrine:  NEGATIVE    Vitals: Pt reports Pulse 80 BP 99/81  Weight 163lbs      Phone call duration: 30 minutes    Jess Dinero LPN    This clinic visit was performed via telephone/video due to COVID-19 restrictions.    Today, I had the pleasure of connecting with Efrem Ramos for a follow-up visit.  He is a pleasant 74 year old patient with " single-vessel coronary disease pending intervention to RCA, atrial fibrillation with high chads Vasc and prior CVA, obstructive sleep apnea, dyslipidemia, hypertension and and valvular disease in the form of severe aortic stenosis mild to moderate mitral stenosis whom I reached out today for a follow-up.  I refer the patient earlier to the TAVR clinic for aortic valve replacement.  He underwent a coronary angiogram as part of work-up which showed severe RCA disease.  Right heart cath showed normal left and right-sided and pulmonary pressures.  However because he was not decided whether surgical or percutaneous approach for aortic valve replacement would be adopted RCA was not intervened on.  He then underwent a CT TAVR protocol which showed favorable anatomy for TAVR.  He is currently scheduled to undergo PCI of RCA on 5/16/2020.  After he recovers from this transcatheter aortic valve placement will be performed at a later date.    Today the patient is accompanied by his wife on phone.  He tells me that he has been experiencing a left-sided, sharp, left inframammary pain almost every day which occurs at rest and lasts for several minutes before resolving spontaneously.  He has also noticed this times when walking the stairs.  He continues to have shortness of breath is chronic and multifactorial due to multiple lung and cardiac issues.  Shortness of breath has not changed.  He denies orthopnea, dizziness, lightheadedness, presyncope or syncope.    Assessment:  1.  Coronary disease-pending intervention to RCA  2.  Severe aortic stenosis-pending TAVR and   3.  Amiodarone induced lung toxicity  4.  History of ischemic CVA  5.  Obstructive sleep apnea  6.  Mild-moderate mitral stenosis, moderate to severe tricuspid regurgitation  7.  Hypertension  8.  Chronic atrial fibrillation on anticoagulation  9.  Peripheral arterial disease- rt superficial femoral artery not amenable to PCI  10.  Prior tobacco abuse    The patient  is scheduled to undergo PCI to RCA on Monday.  I instructed him to hold anticoagulation starting Friday night.  He will continue rest of his medications unchanged.  He reported episodes of left-sided chest pain which is suggestive of angina.  I will add low-dose of Imdur.  I am expecting this to be a temporary measure as fixing RCA should improve his symptoms. I believe will be able to take him off Imdur at a later date after RCA intervention and TAVR.    Plan:   1.  Scheduled for PCI to RCA  2.  Plan to perform TAVR at a later date  3.  Start Imdur 15 mg p.o. daily.  4.  Do not take anticoagulation after Friday night.  5.  Stay well-hydrated before angiogram    Thank you for allowing me to participate in the care of Efrem Ramos    This note was completed in part using Dragon voice recognition software. Although reviewed after completion, some word and grammatical errors may occur.    Severo Xiong MD  Cardiology    No orders of the defined types were placed in this encounter.      Orders Placed This Encounter   Medications     metoprolol succinate ER (TOPROL-XL) 25 MG 24 hr tablet     Sig: Take 37.5 mg by mouth 1/2 tablet in am , and 1 tablet in pm       Medications Discontinued During This Encounter   Medication Reason     metoprolol succinate ER (TOPROL-XL) 25 MG 24 hr tablet Dose adjustment       Encounter Diagnoses   Name Primary?     Coronary artery disease involving native coronary artery of native heart with angina pectoris (H) Yes     Aortic valve stenosis, etiology of cardiac valve disease unspecified        CURRENT MEDICATIONS:  Current Outpatient Medications   Medication Sig Dispense Refill     acetaminophen (TYLENOL) 325 MG tablet Take 325-650 mg by mouth every 6 hours as needed for mild pain       apixaban ANTICOAGULANT (ELIQUIS) 5 MG tablet Take 1 tablet (5 mg) by mouth 2 times daily 180 tablet 3     ASPIRIN PO Take 81 mg by mouth every evening        atorvastatin (LIPITOR) 40 MG tablet Take 1  tablet (40 mg) by mouth daily 90 tablet 3     furosemide (LASIX) 20 MG tablet Take 1 tablet (20 mg) by mouth daily 90 tablet 0     gabapentin (NEURONTIN) 300 MG capsule Take 2 capsules (600 mg) by mouth every evening 60 capsule 5     loperamide (IMODIUM) 2 MG capsule Take 2 mg by mouth 4 times daily as needed for diarrhea       melatonin 1 MG TABS tablet Take 1-3 mg by mouth nightly as needed for sleep       metoprolol succinate ER (TOPROL-XL) 25 MG 24 hr tablet Take 37.5 mg by mouth 1/2 tablet in am , and 1 tablet in pm       multivitamin (OCUVITE) TABS Take 1 tablet by mouth 2 times daily        polyethylene glycol (MIRALAX/GLYCOLAX) powder Take 1 capful by mouth daily as needed for constipation       potassium chloride ER 20 MEQ PO CR tablet Take 1 tablet (20 mEq) by mouth daily 90 tablet 3     calcium carbonate 600 mg-vitamin D 400 units (CALTRATE) 600-400 MG-UNIT per tablet Take 1 tablet by mouth 2 times daily       Cyanocobalamin 2000 MCG TABS Take 2,000 mcg by mouth every 7 days On Sundays       valACYclovir (VALTREX) 1000 mg tablet TAKE 1 TABLET(1000 MG) BY MOUTH THREE TIMES DAILY FOR 7 DAYS 21 tablet 0       ALLERGIES     Allergies   Allergen Reactions     Sulfa Drugs Difficulty breathing     Adhesive Tape Blisters     Amiodarone Other (See Comments)     Developed pleural effusion     Penicillins Unknown     Reaction occurred as a child       PAST MEDICAL HISTORY:  Past Medical History:   Diagnosis Date     Atrial fibrillation (H)      Benign essential hypertension 11/20/2018     Cancer (H) vocal cord     Carpal tunnel syndrome     abstracted 7/3/02.     CVA (cerebral infarction) 5/5/2015     Demyelinating disease of central nervous system, unspecified (H)     abstracted 7/3/02.     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     Pneumonia 8/17/2016     PVD (peripheral vascular disease) (H)      Redundant colon     needs CT colonography     S/P coronary angiogram 09/05/2017     Shingles      SKIN DISORDERS NEC 3/15/2005     Sleep apnea        PAST SURGICAL HISTORY:  Past Surgical History:   Procedure Laterality Date     BIOPSY  brain 2002     BONE MARROW BIOPSY, BONE SPECIMEN, NEEDLE/TROCAR N/A 6/8/2017    Procedure: BIOPSY BONE MARROW;  UNILATERAL BONE MARROW BIOPSY (CONSCIOUS SEDATION) ;  Surgeon: Jamie Gonzales MD;  Location:  GI     C NONSPECIFIC PROCEDURE  as a child    T & A. abstracted 7/3/02.     C NONSPECIFIC PROCEDURE  early    CTR. abstracted 7/3/02.     CARDIAC CATHERIZATION  09/05/2017    2V CAD, IFR of RCA 0.95     CV CORONARY ANGIOGRAM N/A 3/23/2020    Procedure: Coronary Angiogram and right heart cath;  Surgeon: Gino Ferrer MD;  Location:  [facility]ATH [facility]     CV INSTANTANEOUS WAVE-FREE RATIO N/A 3/23/2020    Procedure: Instantaneous Wave-Free Ratio;  Surgeon: Gino Ferrer MD;  Location:  [facility]ATH [facility]     ESOPHAGOSCOPY, GASTROSCOPY, DUODENOSCOPY (EGD), COMBINED N/A 9/8/2018    Procedure: COMBINED ESOPHAGOSCOPY, GASTROSCOPY, DUODENOSCOPY (EGD), BIOPSY SINGLE OR MULTIPLE;;  Surgeon: Xander Marie MD;  Location:  GI     HEAD & NECK SURGERY       ORTHOPEDIC SURGERY      right arm ulna reset after injury     THORACOSCOPIC WEDGE RESECTION LUNG Right 8/2/2016    Procedure: THORACOSCOPIC WEDGE RESECTION LUNG;  Surgeon: Abdelrahman Noriega MD;  Location:  OR       FAMILY HISTORY:  Family History   Problem Relation Age of Onset     Blood Disease Mother         Anemia     Cardiovascular Father      Cancer - colorectal  Maternal Grandfather      Hypertension Brother      Diabetes Sister 5        Constance Diabetes passed at 36     Respiratory Other         Lung Cancer       SOCIAL HISTORY:  Social History     Socioeconomic History     Marital status:      Spouse name: None     Number of children: None     Years of education: None     Highest education level: None   Occupational History     None   Social Needs     Financial resource strain: None     Food insecurity     Worry: None     Inability: None     Transportation needs     Medical: None     Non-medical: None   Tobacco Use     Smoking status: Former Smoker     Packs/day: 1.00     Years: 30.00     Pack years: 30.00     Types: Cigarettes     Last attempt to quit: 2013     Years since quittin.8     Smokeless tobacco: Never Used   Substance and Sexual Activity     Alcohol use: Yes     Alcohol/week: 42.0 standard drinks     Types: 42 Standard drinks or equivalent per week     Comment: occ     Drug use: No     Sexual activity: Not Currently   Lifestyle     Physical activity     Days per week: None     Minutes per session: None     Stress: None   Relationships     Social connections     Talks on phone: None     Gets together: None     Attends Tenriism service: None     Active member of club or organization: None     Attends meetings of clubs or organizations: None     Relationship status: None     Intimate partner violence     Fear of current or ex partner: None     Emotionally abused: None     Physically abused: None     Forced sexual activity: None   Other Topics Concern     Parent/sibling w/ CABG, MI or angioplasty before 65F 55M? Not Asked      Service Not Asked     Blood Transfusions Not Asked     Caffeine Concern Yes     Comment: 1 Coke occasionally      Occupational Exposure Not Asked     Hobby Hazards Not Asked     Sleep Concern Not Asked     Stress Concern Not Asked     Weight Concern Not Asked     Special Diet No     Back Care Not Asked     Exercise  No     Bike Helmet Not Asked     Seat Belt Not Asked     Self-Exams Not Asked   Social History Narrative    Retired (disability)        MD Tyrese  Cardiology

## 2020-05-16 ENCOUNTER — OFFICE VISIT (OUTPATIENT)
Dept: URGENT CARE | Facility: URGENT CARE | Age: 75
End: 2020-05-16
Attending: INTERNAL MEDICINE
Payer: MEDICARE

## 2020-05-16 DIAGNOSIS — Z11.59 ENCOUNTER FOR SCREENING FOR OTHER VIRAL DISEASES: ICD-10-CM

## 2020-05-16 PROCEDURE — 87635 SARS-COV-2 COVID-19 AMP PRB: CPT | Performed by: INTERNAL MEDICINE

## 2020-05-16 PROCEDURE — 99207 ZZC NO BILLABLE SERVICE THIS VISIT: CPT

## 2020-05-16 PROCEDURE — 99000 SPECIMEN HANDLING OFFICE-LAB: CPT | Performed by: INTERNAL MEDICINE

## 2020-05-17 LAB
SARS-COV-2 RNA SPEC QL NAA+PROBE: NOT DETECTED
SPECIMEN SOURCE: NORMAL

## 2020-05-18 ENCOUNTER — HOSPITAL ENCOUNTER (INPATIENT)
Facility: CLINIC | Age: 75
LOS: 17 days | Discharge: NURSING, REHAB OR LONG-TERM CARE | DRG: 246 | End: 2020-06-04
Admitting: INTERNAL MEDICINE
Payer: MEDICARE

## 2020-05-18 ENCOUNTER — APPOINTMENT (OUTPATIENT)
Dept: CT IMAGING | Facility: CLINIC | Age: 75
DRG: 246 | End: 2020-05-18
Attending: NURSE PRACTITIONER
Payer: MEDICARE

## 2020-05-18 ENCOUNTER — SURGERY (OUTPATIENT)
Age: 75
End: 2020-05-18
Payer: MEDICARE

## 2020-05-18 ENCOUNTER — APPOINTMENT (OUTPATIENT)
Dept: ULTRASOUND IMAGING | Facility: CLINIC | Age: 75
DRG: 246 | End: 2020-05-18
Attending: NURSE PRACTITIONER
Payer: MEDICARE

## 2020-05-18 DIAGNOSIS — D62 ANEMIA DUE TO BLOOD LOSS, ACUTE: ICD-10-CM

## 2020-05-18 DIAGNOSIS — I35.0 NONRHEUMATIC AORTIC VALVE STENOSIS: ICD-10-CM

## 2020-05-18 DIAGNOSIS — Z98.890 STATUS POST CORONARY ANGIOGRAM: ICD-10-CM

## 2020-05-18 DIAGNOSIS — I25.10 CORONARY ARTERY DISEASE INVOLVING NATIVE CORONARY ARTERY OF NATIVE HEART WITHOUT ANGINA PECTORIS: ICD-10-CM

## 2020-05-18 DIAGNOSIS — I48.19 PERSISTENT ATRIAL FIBRILLATION (H): ICD-10-CM

## 2020-05-18 DIAGNOSIS — I27.29 OTHER SECONDARY PULMONARY HYPERTENSION (H): ICD-10-CM

## 2020-05-18 DIAGNOSIS — I35.0 NONRHEUMATIC AORTIC VALVE STENOSIS: Primary | ICD-10-CM

## 2020-05-18 DIAGNOSIS — R57.0 CARDIOGENIC SHOCK (H): ICD-10-CM

## 2020-05-18 DIAGNOSIS — E78.2 MIXED HYPERLIPIDEMIA: ICD-10-CM

## 2020-05-18 DIAGNOSIS — J18.9 PNEUMONIA DUE TO INFECTIOUS ORGANISM, UNSPECIFIED LATERALITY, UNSPECIFIED PART OF LUNG: ICD-10-CM

## 2020-05-18 DIAGNOSIS — R58 BLEEDING: ICD-10-CM

## 2020-05-18 LAB
ABO + RH BLD: NORMAL
ABO + RH BLD: NORMAL
ANION GAP SERPL CALCULATED.3IONS-SCNC: 10 MMOL/L (ref 3–14)
ANION GAP SERPL CALCULATED.3IONS-SCNC: 7 MMOL/L (ref 3–14)
APTT PPP: 33 SEC (ref 22–37)
BLD GP AB SCN SERPL QL: NORMAL
BLD PROD TYP BPU: NORMAL
BLD UNIT ID BPU: 0
BLOOD BANK CMNT PATIENT-IMP: NORMAL
BLOOD PRODUCT CODE: NORMAL
BPU ID: NORMAL
BUN SERPL-MCNC: 8 MG/DL (ref 7–30)
BUN SERPL-MCNC: 8 MG/DL (ref 7–30)
CA-I BLD-MCNC: 4.7 MG/DL (ref 4.4–5.2)
CALCIUM SERPL-MCNC: 8.5 MG/DL (ref 8.5–10.1)
CALCIUM SERPL-MCNC: 8.8 MG/DL (ref 8.5–10.1)
CHLORIDE SERPL-SCNC: 109 MMOL/L (ref 94–109)
CHLORIDE SERPL-SCNC: 111 MMOL/L (ref 94–109)
CO2 SERPL-SCNC: 20 MMOL/L (ref 20–32)
CO2 SERPL-SCNC: 24 MMOL/L (ref 20–32)
CREAT SERPL-MCNC: 0.84 MG/DL (ref 0.66–1.25)
CREAT SERPL-MCNC: 0.86 MG/DL (ref 0.66–1.25)
ERYTHROCYTE [DISTWIDTH] IN BLOOD BY AUTOMATED COUNT: 14.9 % (ref 10–15)
ERYTHROCYTE [DISTWIDTH] IN BLOOD BY AUTOMATED COUNT: 15.2 % (ref 10–15)
ERYTHROCYTE [DISTWIDTH] IN BLOOD BY AUTOMATED COUNT: 16.8 % (ref 10–15)
GFR SERPL CREATININE-BSD FRML MDRD: 84 ML/MIN/{1.73_M2}
GFR SERPL CREATININE-BSD FRML MDRD: 84 ML/MIN/{1.73_M2}
GLUCOSE BLDC GLUCOMTR-MCNC: 101 MG/DL (ref 70–99)
GLUCOSE SERPL-MCNC: 155 MG/DL (ref 70–99)
GLUCOSE SERPL-MCNC: 84 MG/DL (ref 70–99)
HCT VFR BLD AUTO: 32.7 % (ref 40–53)
HCT VFR BLD AUTO: 36.7 % (ref 40–53)
HCT VFR BLD AUTO: 38.2 % (ref 40–53)
HGB BLD-MCNC: 10.6 G/DL (ref 13.3–17.7)
HGB BLD-MCNC: 12.1 G/DL (ref 13.3–17.7)
HGB BLD-MCNC: 12.4 G/DL (ref 13.3–17.7)
INR PPP: 1.18 (ref 0.86–1.14)
INR PPP: 1.51 (ref 0.86–1.14)
KCT BLD-ACNC: 171 SEC (ref 75–150)
KCT BLD-ACNC: 199 SEC (ref 75–150)
KCT BLD-ACNC: 211 SEC (ref 75–150)
KCT BLD-ACNC: 219 SEC (ref 75–150)
KCT BLD-ACNC: 239 SEC (ref 75–150)
KCT BLD-ACNC: 267 SEC (ref 75–150)
KCT BLD-ACNC: 308 SEC (ref 75–150)
LACTATE BLD-SCNC: 0.7 MMOL/L (ref 0.7–2)
LACTATE BLD-SCNC: 5.4 MMOL/L (ref 0.7–2)
MAGNESIUM SERPL-MCNC: 1.8 MG/DL (ref 1.6–2.3)
MCH RBC QN AUTO: 31.8 PG (ref 26.5–33)
MCH RBC QN AUTO: 32.9 PG (ref 26.5–33)
MCH RBC QN AUTO: 33 PG (ref 26.5–33)
MCHC RBC AUTO-ENTMCNC: 32.4 G/DL (ref 31.5–36.5)
MCHC RBC AUTO-ENTMCNC: 32.5 G/DL (ref 31.5–36.5)
MCHC RBC AUTO-ENTMCNC: 33 G/DL (ref 31.5–36.5)
MCV RBC AUTO: 101 FL (ref 78–100)
MCV RBC AUTO: 102 FL (ref 78–100)
MCV RBC AUTO: 97 FL (ref 78–100)
NUM BPU REQUESTED: 8
PLATELET # BLD AUTO: 238 10E9/L (ref 150–450)
PLATELET # BLD AUTO: 261 10E9/L (ref 150–450)
PLATELET # BLD AUTO: 310 10E9/L (ref 150–450)
POTASSIUM SERPL-SCNC: 3.9 MMOL/L (ref 3.4–5.3)
POTASSIUM SERPL-SCNC: 4.3 MMOL/L (ref 3.4–5.3)
RADIOLOGIST FLAGS: ABNORMAL
RBC # BLD AUTO: 3.21 10E12/L (ref 4.4–5.9)
RBC # BLD AUTO: 3.77 10E12/L (ref 4.4–5.9)
RBC # BLD AUTO: 3.8 10E12/L (ref 4.4–5.9)
SODIUM SERPL-SCNC: 140 MMOL/L (ref 133–144)
SODIUM SERPL-SCNC: 141 MMOL/L (ref 133–144)
SPECIMEN EXP DATE BLD: NORMAL
TRANSFUSION STATUS PATIENT QL: NORMAL
WBC # BLD AUTO: 11.9 10E9/L (ref 4–11)
WBC # BLD AUTO: 21.4 10E9/L (ref 4–11)
WBC # BLD AUTO: 23.8 10E9/L (ref 4–11)

## 2020-05-18 PROCEDURE — 86900 BLOOD TYPING SEROLOGIC ABO: CPT | Performed by: INTERNAL MEDICINE

## 2020-05-18 PROCEDURE — 93926 LOWER EXTREMITY STUDY: CPT | Mod: LT

## 2020-05-18 PROCEDURE — 25000125 ZZHC RX 250: Performed by: INTERNAL MEDICINE

## 2020-05-18 PROCEDURE — 36415 COLL VENOUS BLD VENIPUNCTURE: CPT

## 2020-05-18 PROCEDURE — 25000125 ZZHC RX 250

## 2020-05-18 PROCEDURE — C1894 INTRO/SHEATH, NON-LASER: HCPCS | Performed by: INTERNAL MEDICINE

## 2020-05-18 PROCEDURE — 36415 COLL VENOUS BLD VENIPUNCTURE: CPT | Performed by: NURSE PRACTITIONER

## 2020-05-18 PROCEDURE — 85027 COMPLETE CBC AUTOMATED: CPT | Performed by: NURSE PRACTITIONER

## 2020-05-18 PROCEDURE — C1769 GUIDE WIRE: HCPCS | Performed by: INTERNAL MEDICINE

## 2020-05-18 PROCEDURE — C1730 CATH, EP, 19 OR FEW ELECT: HCPCS | Performed by: INTERNAL MEDICINE

## 2020-05-18 PROCEDURE — 86901 BLOOD TYPING SEROLOGIC RH(D): CPT | Performed by: INTERNAL MEDICINE

## 2020-05-18 PROCEDURE — 25800030 ZZH RX IP 258 OP 636: Performed by: NURSE PRACTITIONER

## 2020-05-18 PROCEDURE — 85027 COMPLETE CBC AUTOMATED: CPT

## 2020-05-18 PROCEDURE — 85347 COAGULATION TIME ACTIVATED: CPT

## 2020-05-18 PROCEDURE — 20000003 ZZH R&B ICU

## 2020-05-18 PROCEDURE — C9602 PERC D-E COR STENT ATHER S: HCPCS | Performed by: INTERNAL MEDICINE

## 2020-05-18 PROCEDURE — 25800030 ZZH RX IP 258 OP 636: Performed by: INTERNAL MEDICINE

## 2020-05-18 PROCEDURE — 25000128 H RX IP 250 OP 636: Performed by: INTERNAL MEDICINE

## 2020-05-18 PROCEDURE — 25000128 H RX IP 250 OP 636: Performed by: NURSE PRACTITIONER

## 2020-05-18 PROCEDURE — C1887 CATHETER, GUIDING: HCPCS | Performed by: INTERNAL MEDICINE

## 2020-05-18 PROCEDURE — 27210794 ZZH OR GENERAL SUPPLY STERILE: Performed by: INTERNAL MEDICINE

## 2020-05-18 PROCEDURE — 83735 ASSAY OF MAGNESIUM: CPT | Performed by: NURSE PRACTITIONER

## 2020-05-18 PROCEDURE — C1874 STENT, COATED/COV W/DEL SYS: HCPCS | Performed by: INTERNAL MEDICINE

## 2020-05-18 PROCEDURE — 40000344 ZZHCL STATISTIC THAWING COMPONENT: Performed by: INTERNAL MEDICINE

## 2020-05-18 PROCEDURE — 74174 CTA ABD&PLVS W/CONTRAST: CPT

## 2020-05-18 PROCEDURE — 86900 BLOOD TYPING SEROLOGIC ABO: CPT | Performed by: NURSE PRACTITIONER

## 2020-05-18 PROCEDURE — 99291 CRITICAL CARE FIRST HOUR: CPT | Performed by: INTERNAL MEDICINE

## 2020-05-18 PROCEDURE — 99152 MOD SED SAME PHYS/QHP 5/>YRS: CPT | Mod: 59 | Performed by: INTERNAL MEDICINE

## 2020-05-18 PROCEDURE — 85730 THROMBOPLASTIN TIME PARTIAL: CPT

## 2020-05-18 PROCEDURE — 99152 MOD SED SAME PHYS/QHP 5/>YRS: CPT | Performed by: INTERNAL MEDICINE

## 2020-05-18 PROCEDURE — 86923 COMPATIBILITY TEST ELECTRIC: CPT | Performed by: INTERNAL MEDICINE

## 2020-05-18 PROCEDURE — 25000132 ZZH RX MED GY IP 250 OP 250 PS 637: Mod: GY | Performed by: INTERNAL MEDICINE

## 2020-05-18 PROCEDURE — 00000146 ZZHCL STATISTIC GLUCOSE BY METER IP

## 2020-05-18 PROCEDURE — 93571 IV DOP VEL&/PRESS C FLO 1ST: CPT | Mod: 26 | Performed by: INTERNAL MEDICINE

## 2020-05-18 PROCEDURE — 99292 CRITICAL CARE ADDL 30 MIN: CPT | Performed by: NURSE PRACTITIONER

## 2020-05-18 PROCEDURE — 86850 RBC ANTIBODY SCREEN: CPT | Performed by: INTERNAL MEDICINE

## 2020-05-18 PROCEDURE — 40000852 ZZH STATISTIC HEART CATH LAB OR EP LAB

## 2020-05-18 PROCEDURE — 80048 BASIC METABOLIC PNL TOTAL CA: CPT | Performed by: NURSE PRACTITIONER

## 2020-05-18 PROCEDURE — 99291 CRITICAL CARE FIRST HOUR: CPT | Performed by: NURSE PRACTITIONER

## 2020-05-18 PROCEDURE — 92933 PRQ TRLML C ATHRC ST ANGIOP1: CPT | Mod: RC | Performed by: INTERNAL MEDICINE

## 2020-05-18 PROCEDURE — 25000125 ZZHC RX 250: Performed by: NURSE PRACTITIONER

## 2020-05-18 PROCEDURE — 80048 BASIC METABOLIC PNL TOTAL CA: CPT

## 2020-05-18 PROCEDURE — 85610 PROTHROMBIN TIME: CPT | Performed by: NURSE PRACTITIONER

## 2020-05-18 PROCEDURE — 25000128 H RX IP 250 OP 636

## 2020-05-18 PROCEDURE — 93005 ELECTROCARDIOGRAM TRACING: CPT

## 2020-05-18 PROCEDURE — 83605 ASSAY OF LACTIC ACID: CPT | Performed by: NURSE PRACTITIONER

## 2020-05-18 PROCEDURE — P9016 RBC LEUKOCYTES REDUCED: HCPCS | Performed by: INTERNAL MEDICINE

## 2020-05-18 PROCEDURE — 027037Z DILATION OF CORONARY ARTERY, ONE ARTERY WITH FOUR OR MORE DRUG-ELUTING INTRALUMINAL DEVICES, PERCUTANEOUS APPROACH: ICD-10-PCS | Performed by: INTERNAL MEDICINE

## 2020-05-18 PROCEDURE — 93010 ELECTROCARDIOGRAM REPORT: CPT | Mod: 76 | Performed by: INTERNAL MEDICINE

## 2020-05-18 PROCEDURE — C1724 CATH, TRANS ATHEREC,ROTATION: HCPCS | Performed by: INTERNAL MEDICINE

## 2020-05-18 PROCEDURE — 82330 ASSAY OF CALCIUM: CPT | Performed by: NURSE PRACTITIONER

## 2020-05-18 PROCEDURE — 3E043XZ INTRODUCTION OF VASOPRESSOR INTO CENTRAL VEIN, PERCUTANEOUS APPROACH: ICD-10-PCS | Performed by: INTERNAL MEDICINE

## 2020-05-18 PROCEDURE — C1725 CATH, TRANSLUMIN NON-LASER: HCPCS | Performed by: INTERNAL MEDICINE

## 2020-05-18 PROCEDURE — 40000235 ZZH STATISTIC TELEMETRY

## 2020-05-18 PROCEDURE — 99153 MOD SED SAME PHYS/QHP EA: CPT | Performed by: INTERNAL MEDICINE

## 2020-05-18 PROCEDURE — 83605 ASSAY OF LACTIC ACID: CPT

## 2020-05-18 PROCEDURE — 85610 PROTHROMBIN TIME: CPT

## 2020-05-18 PROCEDURE — 99291 CRITICAL CARE FIRST HOUR: CPT | Mod: 25 | Performed by: INTERNAL MEDICINE

## 2020-05-18 DEVICE — STENT SYNERGY DRUG ELUTING 2.75X08MM  H7493926008270: Type: IMPLANTABLE DEVICE | Status: FUNCTIONAL

## 2020-05-18 DEVICE — STENT SYNERGY DRUG ELUTING 3.00X28MM  H7493926028300: Type: IMPLANTABLE DEVICE | Status: FUNCTIONAL

## 2020-05-18 DEVICE — STENT SYNERGY DRUG ELUTING 3.00X38MM  H7493926038300: Type: IMPLANTABLE DEVICE | Status: FUNCTIONAL

## 2020-05-18 DEVICE — STENT SYNERGY DRUG ELUTING 3.00X24MM  H7493926024300: Type: IMPLANTABLE DEVICE | Status: FUNCTIONAL

## 2020-05-18 RX ORDER — FENTANYL CITRATE 50 UG/ML
25 INJECTION, SOLUTION INTRAMUSCULAR; INTRAVENOUS ONCE
Status: COMPLETED | OUTPATIENT
Start: 2020-05-18 | End: 2020-05-18

## 2020-05-18 RX ORDER — ACETAMINOPHEN 325 MG/1
650 TABLET ORAL EVERY 4 HOURS PRN
Status: DISCONTINUED | OUTPATIENT
Start: 2020-05-18 | End: 2020-05-19

## 2020-05-18 RX ORDER — ONDANSETRON 2 MG/ML
4 INJECTION INTRAMUSCULAR; INTRAVENOUS EVERY 6 HOURS PRN
Status: DISCONTINUED | OUTPATIENT
Start: 2020-05-18 | End: 2020-06-04 | Stop reason: HOSPADM

## 2020-05-18 RX ORDER — IOPAMIDOL 755 MG/ML
INJECTION, SOLUTION INTRAVASCULAR
Status: DISCONTINUED | OUTPATIENT
Start: 2020-05-18 | End: 2020-05-18 | Stop reason: HOSPADM

## 2020-05-18 RX ORDER — SODIUM CHLORIDE 9 MG/ML
INJECTION, SOLUTION INTRAVENOUS CONTINUOUS
Status: DISCONTINUED | OUTPATIENT
Start: 2020-05-18 | End: 2020-05-18 | Stop reason: HOSPADM

## 2020-05-18 RX ORDER — DOBUTAMINE HYDROCHLORIDE 200 MG/100ML
2-20 INJECTION INTRAVENOUS CONTINUOUS PRN
Status: DISCONTINUED | OUTPATIENT
Start: 2020-05-18 | End: 2020-05-18 | Stop reason: HOSPADM

## 2020-05-18 RX ORDER — FENTANYL CITRATE 50 UG/ML
25 INJECTION, SOLUTION INTRAMUSCULAR; INTRAVENOUS EVERY 5 MIN PRN
Status: ACTIVE | OUTPATIENT
Start: 2020-05-18 | End: 2020-05-19

## 2020-05-18 RX ORDER — NITROGLYCERIN 20 MG/100ML
.07-2 INJECTION INTRAVENOUS CONTINUOUS PRN
Status: DISCONTINUED | OUTPATIENT
Start: 2020-05-18 | End: 2020-05-18 | Stop reason: HOSPADM

## 2020-05-18 RX ORDER — FENTANYL CITRATE 50 UG/ML
25-50 INJECTION, SOLUTION INTRAMUSCULAR; INTRAVENOUS
Status: DISCONTINUED | OUTPATIENT
Start: 2020-05-18 | End: 2020-05-19

## 2020-05-18 RX ORDER — MAGNESIUM SULFATE HEPTAHYDRATE 40 MG/ML
2 INJECTION, SOLUTION INTRAVENOUS ONCE
Status: COMPLETED | OUTPATIENT
Start: 2020-05-18 | End: 2020-05-18

## 2020-05-18 RX ORDER — FLUMAZENIL 0.1 MG/ML
0.2 INJECTION, SOLUTION INTRAVENOUS
Status: DISCONTINUED | OUTPATIENT
Start: 2020-05-18 | End: 2020-05-19

## 2020-05-18 RX ORDER — AMOXICILLIN 250 MG
2 CAPSULE ORAL 2 TIMES DAILY PRN
Status: DISCONTINUED | OUTPATIENT
Start: 2020-05-18 | End: 2020-06-04 | Stop reason: HOSPADM

## 2020-05-18 RX ORDER — FENTANYL CITRATE 50 UG/ML
50 INJECTION, SOLUTION INTRAMUSCULAR; INTRAVENOUS
Status: COMPLETED | OUTPATIENT
Start: 2020-05-18 | End: 2020-05-18

## 2020-05-18 RX ORDER — DOCUSATE SODIUM 100 MG/1
100 CAPSULE, LIQUID FILLED ORAL 2 TIMES DAILY
Status: DISCONTINUED | OUTPATIENT
Start: 2020-05-18 | End: 2020-05-22

## 2020-05-18 RX ORDER — FENTANYL CITRATE 50 UG/ML
INJECTION, SOLUTION INTRAMUSCULAR; INTRAVENOUS
Status: DISCONTINUED | OUTPATIENT
Start: 2020-05-18 | End: 2020-05-18 | Stop reason: HOSPADM

## 2020-05-18 RX ORDER — NALOXONE HYDROCHLORIDE 0.4 MG/ML
.1-.4 INJECTION, SOLUTION INTRAMUSCULAR; INTRAVENOUS; SUBCUTANEOUS
Status: DISCONTINUED | OUTPATIENT
Start: 2020-05-18 | End: 2020-05-19

## 2020-05-18 RX ORDER — PHENYLEPHRINE HYDROCHLORIDE 10 MG/ML
INJECTION INTRAVENOUS
Status: DISCONTINUED | OUTPATIENT
Start: 2020-05-18 | End: 2020-05-18 | Stop reason: HOSPADM

## 2020-05-18 RX ORDER — CALCIUM CHLORIDE 100 MG/ML
1 INJECTION INTRAVENOUS; INTRAVENTRICULAR ONCE
Status: COMPLETED | OUTPATIENT
Start: 2020-05-18 | End: 2020-05-18

## 2020-05-18 RX ORDER — NOREPINEPHRINE BITARTRATE 0.06 MG/ML
0.03-0.4 INJECTION, SOLUTION INTRAVENOUS CONTINUOUS
Status: DISCONTINUED | OUTPATIENT
Start: 2020-05-18 | End: 2020-05-22

## 2020-05-18 RX ORDER — CLOPIDOGREL BISULFATE 75 MG/1
75 TABLET ORAL DAILY
Status: DISCONTINUED | OUTPATIENT
Start: 2020-05-19 | End: 2020-06-04 | Stop reason: HOSPADM

## 2020-05-18 RX ORDER — LIDOCAINE 40 MG/G
CREAM TOPICAL
Status: DISCONTINUED | OUTPATIENT
Start: 2020-05-18 | End: 2020-05-19

## 2020-05-18 RX ORDER — LIDOCAINE 40 MG/G
CREAM TOPICAL
Status: DISCONTINUED | OUTPATIENT
Start: 2020-05-18 | End: 2020-05-18 | Stop reason: HOSPADM

## 2020-05-18 RX ORDER — HEPARIN SODIUM 10000 [USP'U]/100ML
100-1000 INJECTION, SOLUTION INTRAVENOUS CONTINUOUS PRN
Status: DISCONTINUED | OUTPATIENT
Start: 2020-05-18 | End: 2020-05-18 | Stop reason: HOSPADM

## 2020-05-18 RX ORDER — ATORVASTATIN CALCIUM 40 MG/1
40 TABLET, FILM COATED ORAL DAILY
Status: DISCONTINUED | OUTPATIENT
Start: 2020-05-18 | End: 2020-06-04 | Stop reason: HOSPADM

## 2020-05-18 RX ORDER — IOPAMIDOL 755 MG/ML
82 INJECTION, SOLUTION INTRAVASCULAR ONCE
Status: COMPLETED | OUTPATIENT
Start: 2020-05-18 | End: 2020-05-18

## 2020-05-18 RX ORDER — SODIUM CHLORIDE 9 MG/ML
INJECTION, SOLUTION INTRAVENOUS CONTINUOUS
Status: ACTIVE | OUTPATIENT
Start: 2020-05-18 | End: 2020-05-18

## 2020-05-18 RX ORDER — NITROGLYCERIN 0.4 MG/1
0.4 TABLET SUBLINGUAL EVERY 5 MIN PRN
Status: DISCONTINUED | OUTPATIENT
Start: 2020-05-18 | End: 2020-05-19

## 2020-05-18 RX ORDER — CLOPIDOGREL BISULFATE 75 MG/1
75 TABLET ORAL DAILY
Qty: 90 TABLET | Refills: 3 | Status: SHIPPED | OUTPATIENT
Start: 2020-05-19 | End: 2020-07-09

## 2020-05-18 RX ORDER — ASPIRIN 81 MG/1
81 TABLET ORAL DAILY
Status: DISCONTINUED | OUTPATIENT
Start: 2020-05-18 | End: 2020-05-18

## 2020-05-18 RX ORDER — CLOPIDOGREL BISULFATE 75 MG/1
TABLET ORAL
Status: DISCONTINUED | OUTPATIENT
Start: 2020-05-18 | End: 2020-05-18 | Stop reason: HOSPADM

## 2020-05-18 RX ORDER — HEPARIN SODIUM 1000 [USP'U]/ML
INJECTION, SOLUTION INTRAVENOUS; SUBCUTANEOUS
Status: DISCONTINUED | OUTPATIENT
Start: 2020-05-18 | End: 2020-05-18 | Stop reason: HOSPADM

## 2020-05-18 RX ORDER — AMOXICILLIN 250 MG
1 CAPSULE ORAL 2 TIMES DAILY PRN
Status: DISCONTINUED | OUTPATIENT
Start: 2020-05-18 | End: 2020-06-04 | Stop reason: HOSPADM

## 2020-05-18 RX ORDER — SODIUM CHLORIDE 9 MG/ML
INJECTION, SOLUTION INTRAVENOUS CONTINUOUS
Status: DISCONTINUED | OUTPATIENT
Start: 2020-05-18 | End: 2020-05-19

## 2020-05-18 RX ORDER — DOPAMINE HYDROCHLORIDE 160 MG/100ML
2-20 INJECTION, SOLUTION INTRAVENOUS CONTINUOUS PRN
Status: DISCONTINUED | OUTPATIENT
Start: 2020-05-18 | End: 2020-05-18 | Stop reason: HOSPADM

## 2020-05-18 RX ORDER — NALOXONE HYDROCHLORIDE 0.4 MG/ML
.2-.4 INJECTION, SOLUTION INTRAMUSCULAR; INTRAVENOUS; SUBCUTANEOUS
Status: DISCONTINUED | OUTPATIENT
Start: 2020-05-18 | End: 2020-05-19

## 2020-05-18 RX ORDER — ATROPINE SULFATE 0.1 MG/ML
0.5 INJECTION INTRAVENOUS EVERY 5 MIN PRN
Status: DISCONTINUED | OUTPATIENT
Start: 2020-05-18 | End: 2020-05-19

## 2020-05-18 RX ORDER — CLOPIDOGREL BISULFATE 75 MG/1
75 TABLET ORAL DAILY
Qty: 90 TABLET | Refills: 3 | Status: SHIPPED | OUTPATIENT
Start: 2020-05-19 | End: 2020-06-07

## 2020-05-18 RX ORDER — ASPIRIN 81 MG/1
81 TABLET ORAL DAILY
Status: DISCONTINUED | OUTPATIENT
Start: 2020-05-19 | End: 2020-05-19

## 2020-05-18 RX ORDER — GABAPENTIN 300 MG/1
600 CAPSULE ORAL EVERY EVENING
Status: DISCONTINUED | OUTPATIENT
Start: 2020-05-18 | End: 2020-05-31

## 2020-05-18 RX ORDER — ONDANSETRON 4 MG/1
4 TABLET, ORALLY DISINTEGRATING ORAL EVERY 6 HOURS PRN
Status: DISCONTINUED | OUTPATIENT
Start: 2020-05-18 | End: 2020-06-04 | Stop reason: HOSPADM

## 2020-05-18 RX ORDER — POTASSIUM CHLORIDE 1500 MG/1
20 TABLET, EXTENDED RELEASE ORAL
Status: COMPLETED | OUTPATIENT
Start: 2020-05-18 | End: 2020-05-18

## 2020-05-18 RX ORDER — LIDOCAINE HYDROCHLORIDE 20 MG/ML
JELLY TOPICAL ONCE
Status: COMPLETED | OUTPATIENT
Start: 2020-05-18 | End: 2020-05-18

## 2020-05-18 RX ADMIN — IOPAMIDOL 82 ML: 755 INJECTION, SOLUTION INTRAVENOUS at 20:12

## 2020-05-18 RX ADMIN — IOPAMIDOL 65 ML: 755 INJECTION, SOLUTION INTRAVENOUS at 12:36

## 2020-05-18 RX ADMIN — SODIUM CHLORIDE 500 ML: 9 INJECTION, SOLUTION INTRAVENOUS at 23:20

## 2020-05-18 RX ADMIN — MIDAZOLAM HYDROCHLORIDE 1 MG: 1 INJECTION, SOLUTION INTRAMUSCULAR; INTRAVENOUS at 20:37

## 2020-05-18 RX ADMIN — ASPIRIN 325 MG ORAL TABLET 325 MG: 325 PILL ORAL at 09:27

## 2020-05-18 RX ADMIN — HEPARIN SODIUM 2000 UNITS: 1000 INJECTION INTRAVENOUS; SUBCUTANEOUS at 11:56

## 2020-05-18 RX ADMIN — POTASSIUM CHLORIDE 20 MEQ: 1500 TABLET, EXTENDED RELEASE ORAL at 09:28

## 2020-05-18 RX ADMIN — PHENYLEPHRINE HYDROCHLORIDE 50 MCG: 10 INJECTION INTRAVENOUS at 11:27

## 2020-05-18 RX ADMIN — FENTANYL CITRATE 50 MCG: 0.05 INJECTION, SOLUTION INTRAMUSCULAR; INTRAVENOUS at 20:36

## 2020-05-18 RX ADMIN — MIDAZOLAM 0.5 MG: 1 INJECTION INTRAMUSCULAR; INTRAVENOUS at 10:59

## 2020-05-18 RX ADMIN — MAGNESIUM SULFATE HEPTAHYDRATE 2 G: 40 INJECTION, SOLUTION INTRAVENOUS at 21:38

## 2020-05-18 RX ADMIN — PHENYLEPHRINE HYDROCHLORIDE 100 MCG: 10 INJECTION INTRAVENOUS at 11:42

## 2020-05-18 RX ADMIN — LIDOCAINE HYDROCHLORIDE: 20 JELLY TOPICAL at 20:50

## 2020-05-18 RX ADMIN — HEPARIN SODIUM 4000 UNITS: 1000 INJECTION INTRAVENOUS; SUBCUTANEOUS at 11:29

## 2020-05-18 RX ADMIN — CALCIUM CHLORIDE 1 G: 100 INJECTION INTRAVENOUS; INTRAVENTRICULAR at 19:25

## 2020-05-18 RX ADMIN — PHENYLEPHRINE HYDROCHLORIDE 100 MCG: 10 INJECTION INTRAVENOUS at 11:51

## 2020-05-18 RX ADMIN — FENTANYL CITRATE 25 MCG: 0.05 INJECTION, SOLUTION INTRAMUSCULAR; INTRAVENOUS at 20:04

## 2020-05-18 RX ADMIN — FENTANYL CITRATE 25 MCG: 50 INJECTION, SOLUTION INTRAMUSCULAR; INTRAVENOUS at 10:59

## 2020-05-18 RX ADMIN — SODIUM CHLORIDE: 9 INJECTION, SOLUTION INTRAVENOUS at 08:48

## 2020-05-18 RX ADMIN — ONDANSETRON 4 MG: 2 INJECTION INTRAMUSCULAR; INTRAVENOUS at 21:36

## 2020-05-18 RX ADMIN — LIDOCAINE HYDROCHLORIDE 10 ML: 10 INJECTION, SOLUTION EPIDURAL; INFILTRATION; INTRACAUDAL; PERINEURAL at 11:12

## 2020-05-18 RX ADMIN — SODIUM CHLORIDE 63 ML: 9 INJECTION, SOLUTION INTRAVENOUS at 20:12

## 2020-05-18 RX ADMIN — HEPARIN SODIUM 7000 UNITS: 1000 INJECTION INTRAVENOUS; SUBCUTANEOUS at 11:16

## 2020-05-18 RX ADMIN — CLOPIDOGREL BISULFATE 600 MG: 75 TABLET ORAL at 12:34

## 2020-05-18 RX ADMIN — SODIUM CHLORIDE: 9 INJECTION, SOLUTION INTRAVENOUS at 23:07

## 2020-05-18 RX ADMIN — FENTANYL CITRATE 50 MCG: 50 INJECTION, SOLUTION INTRAMUSCULAR; INTRAVENOUS at 17:27

## 2020-05-18 RX ADMIN — NOREPINEPHRINE BITARTRATE 0.03 MCG/KG/MIN: 1 INJECTION, SOLUTION, CONCENTRATE INTRAVENOUS at 19:25

## 2020-05-18 ASSESSMENT — MIFFLIN-ST. JEOR: SCORE: 1431.62

## 2020-05-18 NOTE — Clinical Note
Stent deployed in the middle right coronary artery. Max pressure = 8 tiago. Total duration = 20 seconds.

## 2020-05-18 NOTE — PROGRESS NOTES
Care Suites Post Procedure Note    Patient Information  Name: Efrem Ramos  Age: 74 year old    Post Procedure  Time patient returned to Care Suites: 1245  Concerns/abnormal assessment: none  If abnormal assessment, provider notified: N/A  Plan/Other: PTCR to RCA- 4 stents placed today to RCA.  VSS, denies pain/  Arterial and Venous sheaths secured in place.  Left groin soft and clean  Last heparin given at 1156.  Last  @ 1214.  Will recheck ACT at 1400.  Will cont to monitor.  Call light in reach.  Pt comfortable.    1422-   , will plan to re-denys at 1500.  Groin site soft but puffy.  Pressure held to site, faint hematoma noted above tegaderm.  Site soft and flat now.  will cont to monitor  Text page sent to Dr Swenson @ 1350 to call and update family and Plavix Rx here and needs to be signed.    1435-   Sepsis alert triggered.  Lactic acid ordered as protocol.    1455-  Per Dr Swenson verbal order- pt will take Aspirin, Plavix and Eliquis for one week.  Then pt will only take Plavix and Eliquils and discontinue aspirin.    Natasha Carrero RN

## 2020-05-18 NOTE — Clinical Note
Stent deployed in the proximal right coronary artery. Max pressure = 11 tiago. Total duration = 20 seconds.

## 2020-05-18 NOTE — Clinical Note
The first balloon was inserted into the right coronary artery and distal right coronary artery.Max pressure = 14 tiago. Total duration = 10 seconds.     Max pressure = 14 tiago. Total duration = 5 seconds.    Balloon reinflated a second time: Max pressure = 14 tiago. Total duration = 5 seconds.  Balloon reinflated a third time: Max pressure = 14 tiago. Total duration = 10 seconds.  Balloon reinflated a fourth time: Max pressure = 14 tiago. Total duration = 7 seconds.

## 2020-05-18 NOTE — Clinical Note
The first balloon was inserted into the right coronary artery and middle right coronary artery.Max pressure = 12 tiago. Total duration = 20 seconds.     Max pressure = 14 tiago. Total duration = 10 seconds.    Balloon reinflated a second time: Max pressure = 14 tiago. Total duration = 10 seconds.

## 2020-05-18 NOTE — Clinical Note
Potential access sites were evaluated for patency using ultrasound.   The right brachial vein was selected. Access was obtained under with Sonosite guidance using a micropuncture 21 guage needle with direct visualization of needle entry.

## 2020-05-18 NOTE — Clinical Note
Atherectomy performed in the middle right coronary artery. Rotational atherectomy was performed. Pass rate = 160 RPM. Pass duration = 15 seconds.

## 2020-05-18 NOTE — Clinical Note
The first balloon was inserted into the right coronary artery and middle right coronary artery.Max pressure = 12 tiago. Total duration = 10 seconds.     Max pressure = 14 tiago. Total duration = 10 seconds.    Balloon reinflated a second time: Max pressure = 14 tiago. Total duration = 10 seconds. Max pressure = 14 tiago. Total duration = 10 seconds.  Balloon reinflated a fourth time: Max pressure = 14 tiago. Total duration = 5 seconds.

## 2020-05-18 NOTE — Clinical Note
Max pressure = 16 tiago. Total duration = 5 seconds.     Max pressure = 18 tiago. Total duration = 5 seconds.    Balloon reinflated a second time: Max pressure = 18 tiago. Total duration = 5 seconds.  Balloon reinflated a third time: Max pressure = 16 tiago. Total duration = 10 seconds.

## 2020-05-18 NOTE — PROGRESS NOTES
"Care Suites Admission Nursing Note    Patient Information  Name: Efrem Ramos  Age: 74 year old  Reason for admission: coronary angiogram  Care Suites arrival time: 0810    Patient Admission/Assessment   Pre-procedure assessment complete: Yes  If abnormal assessment/labs, provider notified: No  NPO: Yes  Medications held per instructions/orders: N/A  Consent: deferred  If applicable, pregnancy test status: deferred  Patient oriented to room: Yes  Education/questions answered: Yes  Plan/other: labs pending, awaiting 10:00 angiogram.  No pain today.  Reports occasional \"shooting pain under left nipple, very brief, goes away on its own.    Discharge Planning  Accompanied by: none  Discharge name/phone number: Wife Mariajose  [phone], Son Alfredo will .  Overnight post sedation caregiver: wife  Discharge location: home    Natasha Carrero RN         "

## 2020-05-18 NOTE — PRE-PROCEDURE
GENERAL PRE-PROCEDURE:   Date/Time:  5/18/2020 10:51 AM    Verbal consent obtained?: Yes    Written consent obtained?: Yes    Risks and benefits: Risks, benefits and alternatives were discussed    Consent given by:  Patient  Patient states understanding of procedure being performed: Yes    Patient's understanding of procedure matches consent: Yes    Procedure consent matches procedure scheduled: Yes    Expected level of sedation:  Moderate  Appropriately NPO:  Yes  ASA Class:  Class 3- Severe systemic disease, definite functional limitations  Mallampati  :  Grade 3- soft palate visible, posterior pharyngeal wall not visible  Lungs:  Lungs clear with good breath sounds bilaterally  Heart:  Normal heart sounds and rate  History & Physical reviewed:  History and physical reviewed and no updates needed  Statement of review:  I have reviewed the lab findings, diagnostic data, medications, and the plan for sedation

## 2020-05-18 NOTE — Clinical Note
Atherectomy performed in the middle right coronary artery. Rotational atherectomy was performed. Pass rate = 150 RPM. Pass duration = 20 seconds.

## 2020-05-18 NOTE — Clinical Note
Stent deployed in the distal right coronary artery. Max pressure = 16 tiago. Total duration = 15 seconds. Balloon reinflated a second time: Max pressure = 18 tiago. Total duration = 5 seconds.

## 2020-05-18 NOTE — Clinical Note
Stent deployed in the distal right coronary artery. Max pressure = 10 tiago. Total duration = 20 seconds.

## 2020-05-18 NOTE — Clinical Note
Atherectomy performed in the middle right coronary artery. Pass rate = 160 RPM. Pass duration = 15 seconds.

## 2020-05-18 NOTE — Clinical Note
The first balloon was inserted into the right coronary artery and middle right coronary artery.Max pressure = 12 tiago. Total duration = 10 seconds.      Balloon reinflated a second time:

## 2020-05-18 NOTE — Clinical Note
The first balloon was inserted into the right coronary artery and distal right coronary artery.Max pressure = 16 tiago. Total duration = 10 seconds.

## 2020-05-18 NOTE — PROGRESS NOTES
1720 .  Arterial sheath dc'd from (L) femoral artery. Fentanyl 50mcg given for sheath pull. Intermittent large hematoma above site easily resolved with manual pressure.  1754 Venous sheath dc'd from (L) femoral site, manual pressure held. Site stable.  1820 Hematoma quickly redeveloped once manual pressure removed.  1825 Femstop applied to (L) femoral as well as manual pressure to surrounding site. Cardiology called.  1830 Hypotensive with BP's 60's, RRT in progress.  Scrotal swelling now apparent more notably on (L)  Refer to RRT notes for further information.  Rapid transfusion protocol in progress.  1925 Spouse Mariajose updated on patients condition, emergent  scan, blood transfusion and transfer to ICU.  1935 Transferred to CT per cart with flying squad, zoll and belongings. Stent cards and 2 filled rx's enclosed in chart.

## 2020-05-19 ENCOUNTER — APPOINTMENT (OUTPATIENT)
Dept: ULTRASOUND IMAGING | Facility: CLINIC | Age: 75
DRG: 246 | End: 2020-05-19
Attending: INTERNAL MEDICINE
Payer: MEDICARE

## 2020-05-19 ENCOUNTER — APPOINTMENT (OUTPATIENT)
Dept: GENERAL RADIOLOGY | Facility: CLINIC | Age: 75
DRG: 246 | End: 2020-05-19
Attending: INTERNAL MEDICINE
Payer: MEDICARE

## 2020-05-19 LAB
ANION GAP SERPL CALCULATED.3IONS-SCNC: 9 MMOL/L (ref 3–14)
BUN SERPL-MCNC: 13 MG/DL (ref 7–30)
CALCIUM SERPL-MCNC: 8.4 MG/DL (ref 8.5–10.1)
CHLORIDE SERPL-SCNC: 113 MMOL/L (ref 94–109)
CO2 SERPL-SCNC: 19 MMOL/L (ref 20–32)
CREAT SERPL-MCNC: 1.08 MG/DL (ref 0.66–1.25)
GFR SERPL CREATININE-BSD FRML MDRD: 66 ML/MIN/{1.73_M2}
GLUCOSE BLDC GLUCOMTR-MCNC: 108 MG/DL (ref 70–99)
GLUCOSE BLDC GLUCOMTR-MCNC: 111 MG/DL (ref 70–99)
GLUCOSE BLDC GLUCOMTR-MCNC: 99 MG/DL (ref 70–99)
GLUCOSE SERPL-MCNC: 115 MG/DL (ref 70–99)
HGB BLD-MCNC: 10.3 G/DL (ref 13.3–17.7)
HGB BLD-MCNC: 11.6 G/DL (ref 13.3–17.7)
HGB BLD-MCNC: 11.8 G/DL (ref 13.3–17.7)
HGB BLD-MCNC: 9 G/DL (ref 13.3–17.7)
HGB BLD-MCNC: 9.7 G/DL (ref 13.3–17.7)
LACTATE BLD-SCNC: 2.4 MMOL/L (ref 0.7–2)
MAGNESIUM SERPL-MCNC: 2.4 MG/DL (ref 1.6–2.3)
PHOSPHATE SERPL-MCNC: 4.6 MG/DL (ref 2.5–4.5)
POTASSIUM SERPL-SCNC: 4.9 MMOL/L (ref 3.4–5.3)
SODIUM SERPL-SCNC: 141 MMOL/L (ref 133–144)
TROPONIN I SERPL-MCNC: 0.65 UG/L (ref 0–0.04)
TROPONIN I SERPL-MCNC: 0.69 UG/L (ref 0–0.04)
TROPONIN I SERPL-MCNC: 0.8 UG/L (ref 0–0.04)

## 2020-05-19 PROCEDURE — 85018 HEMOGLOBIN: CPT | Performed by: INTERNAL MEDICINE

## 2020-05-19 PROCEDURE — 93926 LOWER EXTREMITY STUDY: CPT | Mod: LT

## 2020-05-19 PROCEDURE — 25000128 H RX IP 250 OP 636: Performed by: INTERNAL MEDICINE

## 2020-05-19 PROCEDURE — 93005 ELECTROCARDIOGRAM TRACING: CPT

## 2020-05-19 PROCEDURE — 85018 HEMOGLOBIN: CPT | Performed by: SURGERY

## 2020-05-19 PROCEDURE — 99233 SBSQ HOSP IP/OBS HIGH 50: CPT | Performed by: INTERNAL MEDICINE

## 2020-05-19 PROCEDURE — 25000125 ZZHC RX 250: Performed by: NURSE PRACTITIONER

## 2020-05-19 PROCEDURE — 25800030 ZZH RX IP 258 OP 636: Performed by: INTERNAL MEDICINE

## 2020-05-19 PROCEDURE — 93010 ELECTROCARDIOGRAM REPORT: CPT | Performed by: INTERNAL MEDICINE

## 2020-05-19 PROCEDURE — 36415 COLL VENOUS BLD VENIPUNCTURE: CPT | Performed by: INTERNAL MEDICINE

## 2020-05-19 PROCEDURE — 25000132 ZZH RX MED GY IP 250 OP 250 PS 637: Mod: GY | Performed by: NURSE PRACTITIONER

## 2020-05-19 PROCEDURE — 84100 ASSAY OF PHOSPHORUS: CPT | Performed by: INTERNAL MEDICINE

## 2020-05-19 PROCEDURE — 40000986 XR CHEST PORT 1 VW

## 2020-05-19 PROCEDURE — 80048 BASIC METABOLIC PNL TOTAL CA: CPT | Performed by: INTERNAL MEDICINE

## 2020-05-19 PROCEDURE — 25000132 ZZH RX MED GY IP 250 OP 250 PS 637: Mod: GY | Performed by: INTERNAL MEDICINE

## 2020-05-19 PROCEDURE — 25800030 ZZH RX IP 258 OP 636: Performed by: NURSE PRACTITIONER

## 2020-05-19 PROCEDURE — 20000003 ZZH R&B ICU

## 2020-05-19 PROCEDURE — 84484 ASSAY OF TROPONIN QUANT: CPT | Performed by: SURGERY

## 2020-05-19 PROCEDURE — 83735 ASSAY OF MAGNESIUM: CPT | Performed by: INTERNAL MEDICINE

## 2020-05-19 PROCEDURE — 83605 ASSAY OF LACTIC ACID: CPT | Performed by: INTERNAL MEDICINE

## 2020-05-19 PROCEDURE — 00000146 ZZHCL STATISTIC GLUCOSE BY METER IP

## 2020-05-19 PROCEDURE — 84484 ASSAY OF TROPONIN QUANT: CPT | Performed by: INTERNAL MEDICINE

## 2020-05-19 PROCEDURE — 99291 CRITICAL CARE FIRST HOUR: CPT | Mod: 25 | Performed by: INTERNAL MEDICINE

## 2020-05-19 RX ORDER — SODIUM CHLORIDE, SODIUM LACTATE, POTASSIUM CHLORIDE, CALCIUM CHLORIDE 600; 310; 30; 20 MG/100ML; MG/100ML; MG/100ML; MG/100ML
INJECTION, SOLUTION INTRAVENOUS CONTINUOUS
Status: DISCONTINUED | OUTPATIENT
Start: 2020-05-19 | End: 2020-05-20

## 2020-05-19 RX ORDER — SODIUM CHLORIDE 9 MG/ML
INJECTION, SOLUTION INTRAVENOUS CONTINUOUS
Status: DISCONTINUED | OUTPATIENT
Start: 2020-05-19 | End: 2020-05-19

## 2020-05-19 RX ORDER — LIDOCAINE 40 MG/G
CREAM TOPICAL
Status: DISCONTINUED | OUTPATIENT
Start: 2020-05-19 | End: 2020-06-04 | Stop reason: HOSPADM

## 2020-05-19 RX ORDER — NITROGLYCERIN 0.4 MG/1
0.4 TABLET SUBLINGUAL EVERY 5 MIN PRN
Status: DISCONTINUED | OUTPATIENT
Start: 2020-05-19 | End: 2020-06-04 | Stop reason: HOSPADM

## 2020-05-19 RX ORDER — ASPIRIN 81 MG/1
81 TABLET ORAL DAILY
Status: DISCONTINUED | OUTPATIENT
Start: 2020-05-19 | End: 2020-05-25

## 2020-05-19 RX ORDER — DIGOXIN 0.25 MG/ML
125 INJECTION INTRAMUSCULAR; INTRAVENOUS ONCE
Status: COMPLETED | OUTPATIENT
Start: 2020-05-19 | End: 2020-05-19

## 2020-05-19 RX ORDER — FLUMAZENIL 0.1 MG/ML
0.2 INJECTION, SOLUTION INTRAVENOUS
Status: ACTIVE | OUTPATIENT
Start: 2020-05-19 | End: 2020-05-19

## 2020-05-19 RX ORDER — DIGOXIN 0.25 MG/ML
250 INJECTION INTRAMUSCULAR; INTRAVENOUS ONCE
Status: COMPLETED | OUTPATIENT
Start: 2020-05-19 | End: 2020-05-19

## 2020-05-19 RX ORDER — ALBUTEROL SULFATE 0.83 MG/ML
2.5 SOLUTION RESPIRATORY (INHALATION) EVERY 6 HOURS PRN
Status: DISCONTINUED | OUTPATIENT
Start: 2020-05-19 | End: 2020-06-04 | Stop reason: HOSPADM

## 2020-05-19 RX ORDER — ATROPINE SULFATE 0.1 MG/ML
0.5 INJECTION INTRAVENOUS EVERY 5 MIN PRN
Status: ACTIVE | OUTPATIENT
Start: 2020-05-19 | End: 2020-05-19

## 2020-05-19 RX ORDER — FENTANYL CITRATE 50 UG/ML
25-50 INJECTION, SOLUTION INTRAMUSCULAR; INTRAVENOUS
Status: ACTIVE | OUTPATIENT
Start: 2020-05-19 | End: 2020-05-19

## 2020-05-19 RX ORDER — ACETAMINOPHEN 325 MG/1
650 TABLET ORAL EVERY 4 HOURS PRN
Status: DISCONTINUED | OUTPATIENT
Start: 2020-05-19 | End: 2020-05-24

## 2020-05-19 RX ORDER — NALOXONE HYDROCHLORIDE 0.4 MG/ML
.1-.4 INJECTION, SOLUTION INTRAMUSCULAR; INTRAVENOUS; SUBCUTANEOUS
Status: DISCONTINUED | OUTPATIENT
Start: 2020-05-19 | End: 2020-05-28

## 2020-05-19 RX ORDER — NALOXONE HYDROCHLORIDE 0.4 MG/ML
.2-.4 INJECTION, SOLUTION INTRAMUSCULAR; INTRAVENOUS; SUBCUTANEOUS
Status: ACTIVE | OUTPATIENT
Start: 2020-05-19 | End: 2020-05-19

## 2020-05-19 RX ADMIN — GABAPENTIN 600 MG: 300 CAPSULE ORAL at 22:24

## 2020-05-19 RX ADMIN — CLOPIDOGREL BISULFATE 75 MG: 75 TABLET, FILM COATED ORAL at 09:48

## 2020-05-19 RX ADMIN — ASPIRIN 81 MG: 81 TABLET, DELAYED RELEASE ORAL at 09:48

## 2020-05-19 RX ADMIN — SODIUM CHLORIDE, POTASSIUM CHLORIDE, SODIUM LACTATE AND CALCIUM CHLORIDE: 600; 310; 30; 20 INJECTION, SOLUTION INTRAVENOUS at 01:13

## 2020-05-19 RX ADMIN — DOCUSATE SODIUM 100 MG: 100 CAPSULE, LIQUID FILLED ORAL at 08:43

## 2020-05-19 RX ADMIN — NOREPINEPHRINE BITARTRATE 0.09 MCG/KG/MIN: 1 INJECTION, SOLUTION, CONCENTRATE INTRAVENOUS at 15:12

## 2020-05-19 RX ADMIN — SENNOSIDES AND DOCUSATE SODIUM 1 TABLET: 8.6; 5 TABLET ORAL at 08:43

## 2020-05-19 RX ADMIN — DIGOXIN 125 MCG: 0.25 INJECTION INTRAMUSCULAR; INTRAVENOUS at 10:53

## 2020-05-19 RX ADMIN — DIGOXIN 250 MCG: 0.25 INJECTION INTRAMUSCULAR; INTRAVENOUS at 02:22

## 2020-05-19 RX ADMIN — SODIUM CHLORIDE 500 ML: 9 INJECTION, SOLUTION INTRAVENOUS at 01:14

## 2020-05-19 RX ADMIN — ACETAMINOPHEN 650 MG: 325 TABLET, FILM COATED ORAL at 12:48

## 2020-05-19 RX ADMIN — ACETAMINOPHEN 650 MG: 325 TABLET, FILM COATED ORAL at 08:43

## 2020-05-19 RX ADMIN — SODIUM CHLORIDE, POTASSIUM CHLORIDE, SODIUM LACTATE AND CALCIUM CHLORIDE: 600; 310; 30; 20 INJECTION, SOLUTION INTRAVENOUS at 11:43

## 2020-05-19 RX ADMIN — ATORVASTATIN CALCIUM 40 MG: 40 TABLET, FILM COATED ORAL at 08:43

## 2020-05-19 ASSESSMENT — ACTIVITIES OF DAILY LIVING (ADL)
BATHING: 1-->ASSISTIVE EQUIPMENT
FALL_HISTORY_WITHIN_LAST_SIX_MONTHS: NO
SWALLOWING: 0-->SWALLOWS FOODS/LIQUIDS WITHOUT DIFFICULTY
RETIRED_EATING: 0-->INDEPENDENT
DRESS: 0-->INDEPENDENT
ADLS_ACUITY_SCORE: 21
COGNITION: 0 - NO COGNITION ISSUES REPORTED
ADLS_ACUITY_SCORE: 21
AMBULATION: 1-->ASSISTIVE EQUIPMENT
WHICH_OF_THE_ABOVE_FUNCTIONAL_RISKS_HAD_A_RECENT_ONSET_OR_CHANGE?: TRANSFERRING;AMBULATION
TRANSFERRING: 1-->ASSISTIVE EQUIPMENT
TOILETING: 1-->ASSISTIVE EQUIPMENT
ADLS_ACUITY_SCORE: 16
RETIRED_COMMUNICATION: 0-->UNDERSTANDS/COMMUNICATES WITHOUT DIFFICULTY
ADLS_ACUITY_SCORE: 21

## 2020-05-19 ASSESSMENT — MIFFLIN-ST. JEOR: SCORE: 1457.63

## 2020-05-19 NOTE — CONSULTS
"UROLOGY CONSULTATION:    PATIENT: Efrem Ramos       (1945)  AGE: 74 year old year old male    Referring Physician: Gerrado Cronin  Primary Physician: Danny Paige    CHIEF COMPLAINT:  Penile and Scrotal Swelling    HPI:   Mr. Ramos is a very pleasant 74 yoM who underwent a cardiac catheterization 20 and subsequently developed severe groin stick hematoma with evidence of hemorrhagic shock. He was transferred to the ICU. Patient has known critical aortic stenosis and is being prepared for a potential TAVR. Between his cardiogenic edema and the edema from his groin hematoma his scrotum and penis have developed a significant amount of swelling. Overnight the intensivist attempted to place a pedroza catheter for I&O's monitoring but was unsuccessful due to the edema of his foreskin (uncircumcised). So a consultation was placed this morning for evaluation.    Mr. Ramos has been able to spontaneously urinate in the meantime. He reports that he has never seen a urologist before and his baseline urination is \"just fine.\"  He does endorse a prior catheterization as he was recovering from a previous surgery but otherwise no urological history.  At current he does not feel like he is retain and endorses sensation of emptying and normal urge to urinate.  He would like to avoid cauterization if possible as last night's attempts proved very painful in the setting of his groin hematoma.    Past Medical History:   Diagnosis Date     Atrial fibrillation (H)      Benign essential hypertension 2018     Cancer (H) vocal cord     Carpal tunnel syndrome     abstracted 7/3/02.     CVA (cerebral infarction) 2015     Demyelinating disease of central nervous system, unspecified (H)     abstracted 7/3/02.     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     Pneumonia 8/17/2016     PVD (peripheral vascular disease) (H)      Redundant colon     needs CT colonography     S/P coronary angiogram 09/05/2017     Shingles      SKIN DISORDERS NEC 3/15/2005     Sleep apnea      Past Surgical History:   Procedure Laterality Date     BIOPSY  brain 2002     BONE MARROW BIOPSY, BONE SPECIMEN, NEEDLE/TROCAR N/A 6/8/2017    Procedure: BIOPSY BONE MARROW;  UNILATERAL BONE MARROW BIOPSY (CONSCIOUS SEDATION) ;  Surgeon: Jamie Gonzales MD;  Location:  GI     C NONSPECIFIC PROCEDURE  as a child    T & A. abstracted 7/3/02.     C NONSPECIFIC PROCEDURE  early    CTR. abstracted 7/3/02.     CARDIAC CATHERIZATION  09/05/2017    2V CAD, IFR of RCA 0.95     CV CORONARY ANGIOGRAM N/A 3/23/2020    Procedure: Coronary Angiogram and right heart cath;  Surgeon: Gino Ferrer MD;  Location:  HEART CARDIAC CATH LAB     CV INSTANTANEOUS WAVE-FREE RATIO N/A 3/23/2020    Procedure: Instantaneous Wave-Free Ratio;  Surgeon: Gino Ferrer MD;  Location:  HEART CARDIAC CATH LAB     ESOPHAGOSCOPY, GASTROSCOPY, DUODENOSCOPY (EGD), COMBINED N/A 9/8/2018    Procedure: COMBINED ESOPHAGOSCOPY, GASTROSCOPY, DUODENOSCOPY (EGD), BIOPSY SINGLE OR MULTIPLE;;  Surgeon: Xander Marie MD;  Location:  GI     HEAD & NECK SURGERY       ORTHOPEDIC SURGERY      right arm ulna reset after injury     THORACOSCOPIC WEDGE RESECTION LUNG Right 8/2/2016    Procedure: THORACOSCOPIC WEDGE RESECTION LUNG;  Surgeon: Abdelrahman Noriega MD;  Location:  OR     PAST SOCIAL HISTORY  Social History     Social History Narrative    Retired (disability)      Social History     Tobacco Use      "Smoking status: Former Smoker     Packs/day: 1.00     Years: 30.00     Pack years: 30.00     Types: Cigarettes     Last attempt to quit: 2013     Years since quittin.8     Smokeless tobacco: Never Used   Substance Use Topics     Alcohol use: Yes     Alcohol/week: 42.0 standard drinks     Types: 42 Standard drinks or equivalent per week     Comment: occ       FAMILY HISTORY  family history includes Blood Disease in his mother; Cancer - colorectal in his maternal grandfather; Cardiovascular in his father; Diabetes (age of onset: 5) in his sister; Hypertension in his brother; Respiratory in an other family member.    REVIEW OF SYSTEMS:  A comprehensive review of systems was reviewed with the patient with all findings across 10 systems negative except as per dictated in HPI.    There are no exam notes on file for this visit.             Current Outpatient Medications   Medication Sig Dispense Refill     aspirin (ASA) 81 MG EC tablet Take 1 tablet (81 mg) by mouth daily Start tomorrow morning. 90 tablet 3     clopidogrel (PLAVIX) 75 MG tablet Take 1 tablet (75 mg) by mouth daily 90 tablet 3     clopidogrel (PLAVIX) 75 MG tablet Take 1 tablet (75 mg) by mouth daily 90 tablet 3                Allergies   Allergen Reactions     Sulfa Drugs Difficulty breathing     Adhesive Tape Blisters     Amiodarone Other (See Comments)     Developed pleural effusion     Penicillins Unknown     Reaction occurred as a child       PHYSICAL EXAM:          BP 94/57   Pulse 113   Temp 98.5  F (36.9  C) (Oral)   Resp 21   Ht 1.702 m (5' 7\")   Wt 76.9 kg (169 lb 8.5 oz)   SpO2 92%   BMI 26.55 kg/m           General appearance shows no deformaties and good grooming.  EYES: no icterus  HEAD, EARS, NOSE, MOUTH, AND THROAT: atraumatic, normocephalic  NECK: symmetric  CHEST WALL: symmetric  CARDIAC: skin well perfused, pulses regular  RESPIRATORY: breathing unlabored  ABDOMEN: soft, nontender, non distended, no rebound, guarding or " peritoneal signs. No palpable bladder above the pubic symphysis, very tender anywhere near groin hematoma.  BACK/FLANKS: no CVA tenderness bilaterally  : significant amount of both penile and scrotal swelling and ecchymosis. I am able to gently squeeze some fluid out of the phallus but evidence of phimotic ring is present.  RECTAL: patient expresses too much pain to position to make JESSICA possible.  SKIN/HAIR/NAILS: no visible rashes  NEUROLOGIC: no focal deficits  PSYCHIATRIC: Alert and oriented x3. Speech: normal Mood: normal Affect: normal    LABS:          The results of your urine culture showed no significant bacterial growth.             PSA   Date Value Ref Range Status   07/25/2016 0.37 0 - 4 ug/L Final               Hemoglobin   Date Value Ref Range Status   05/19/2020 10.3 (L) 13.3 - 17.7 g/dL Final   ]           Lab Results   Component Value Date    CR 1.08 05/19/2020         IMAGING:   Images from patient's CTA A/P dated 5/18/2020 reviewed and I concur with the Radiologist's interpretation...    FINDINGS: There is active extravasation from probably the distal  external iliac artery vs. very proximal common femoral artery into the  superficial tissues of the left groin, as well as into the inguinal  canal which extends into the scrotum. The scrotum is markedly  distended through the hematoma. No significant intra-abdominal  extension. There are extensive atherosclerotic changes of the  visualized aorta and its branches. There is no evidence of aortic  dissection or aneurysm. There are no dilated loops of small intestine  or large bowel to suggest ileus or obstruction. The liver, spleen,  adrenal glands, kidneys, and pancreas demonstrate no worrisome focal  lesion.                                                                       IMPRESSION: Active extravasation from the left common femoral artery  into the left groin and extensive hemorrhage into the scrotum. The  center of the compression device is  approximately 4 cm caudal and 3 cm  lateral to the area of active extravasation.    Additional Read by me:  Bladder appears full but not grossly distended, suggesting that he is not in significant retention. Evidence of some prostatomegaly and small bladder trabeculations but not severe.        ASSESSMENT:   74 yoM with significant groin hematoma with resulting penile and scrotal edema.    PLAN:    1. Penile and Scrotal Edema  - Encouraged scrotal elevation  - With gentle manual compression I am able to push much of his penile edema out. I anticipate he does have a phimotic ring at baseline but this appears mild. His case was discussed the the intensivist on duty. As too much manipulation is painful for him and he seems to be voiding well (voided volumes 175ml+) we will hold off on pedroza catheter at this time.   - Would recommend addition of tamsulosin if ok with intensivist    2. Groin Hematoma with Scrotal/Penile Echymosis  - Will likely resolve with conservative measures.    Daniel Campbell MD  [facility]  Pager: [phone]  Office: [phone]  Surgical [phone]

## 2020-05-19 NOTE — CONSULTS
VASCULAR SURGERY CONSULTATION    VASCULAR SURGEON: Marcia Worthy MD    LOCATION:  [facility]    Efrem Ramos   Medical Record #:  [id number]  YOB: 1945  Age:  74 year old     Date of Service: 5/8/2020    PRIMARY CARE PROVIDER: Danny Paige      Reason for visit: Left groin hematoma    IMPRESSION: Patient who had significant bleeding from left groin 6 [place] access site for cardiac catheterization and coronary stenting.  After identification of location of bleeding and direct pressure and close monitoring over the period of an hour followed by completion duplex imaging bleeding controlled and patient is coming down on pressors    RECOMMENDATION: Remain with left groin immobilized overnight.  Close monitoring in ICU.  Ongoing volume resuscitation likely critical but needs to be closely managed given his critical aortic stenosis.  Repeat duplex in a.m.    HPI:  Efrem Ramos is a 74 year old male who was seen today in consultation for massive bleeding and hypotension with severe scrotal and groin hematoma shortly after cardiac catheterization performed and completed at 5:20 PM today.  Called urgently to support hypotensive patient and after review over the phone plan was to initiate resuscitation directly apply pressure to access site and get an urgent CTA.  CT demonstrated active bleeding from the mid common femoral artery.  I came in to see the patient in the ICU who was mildly somnolent but arousable and in fairly significant discomfort.    Patient has critical aortic stenosis and is being planned for a TAVR.  As part of that work-up his coronaries were evaluated and rotational atherectomy was performed of a coronary artery today.  Patient to be taken off his Eliquis for 3 days.  He was loaded on 600 mg of Plavix and 70103 units of heparin for the procedure.  On completion of the procedure his ACT was down to 170.  No attempt was made to percutaneously close the access site.   Local pressure was applied for hemostasis by the coronary cath team.    REVIEW OF SYSTEMS:    A 12 point ROS was reviewed and except for what is listed in the HPI above, all others are negative    PHH:    Past Medical History:   Diagnosis Date     Atrial fibrillation (H)      Benign essential hypertension 11/20/2018     Cancer (H) vocal cord     Carpal tunnel syndrome     abstracted 7/3/02.     CVA (cerebral infarction) 5/5/2015     Demyelinating disease of central nervous system, unspecified (H)     abstracted 7/3/02.     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     Pneumonia 8/17/2016     PVD (peripheral vascular disease) (H)      Redundant colon     needs CT colonography     S/P coronary angiogram 09/05/2017     Shingles      SKIN DISORDERS NEC 3/15/2005     Sleep apnea         Past Surgical History:   Procedure Laterality Date     BIOPSY  brain 2002     BONE MARROW BIOPSY, BONE SPECIMEN, NEEDLE/TROCAR N/A 6/8/2017    Procedure: BIOPSY BONE MARROW;  UNILATERAL BONE MARROW BIOPSY (CONSCIOUS SEDATION) ;  Surgeon: Jamie Gonzales MD;  Location:  GI     C NONSPECIFIC PROCEDURE  as a child    T & A. abstracted 7/3/02.     C NONSPECIFIC PROCEDURE  early    CTR. abstracted 7/3/02.     CARDIAC CATHERIZATION  09/05/2017    2V CAD, IFR of RCA 0.95     CV CORONARY ANGIOGRAM N/A 3/23/2020    Procedure: Coronary Angiogram and right heart cath;  Surgeon: Gino Ferrer MD;  Location:  HEART CARDIAC CATH LAB     CV INSTANTANEOUS WAVE-FREE RATIO N/A 3/23/2020    Procedure:  Instantaneous Wave-Free Ratio;  Surgeon: Gino Ferrer MD;  Location:  HEART CARDIAC CATH LAB     ESOPHAGOSCOPY, GASTROSCOPY, DUODENOSCOPY (EGD), COMBINED N/A 9/8/2018    Procedure: COMBINED ESOPHAGOSCOPY, GASTROSCOPY, DUODENOSCOPY (EGD), BIOPSY SINGLE OR MULTIPLE;;  Surgeon: Xander Marie MD;  Location:  GI     HEAD & NECK SURGERY       ORTHOPEDIC SURGERY      right arm ulna reset after injury     THORACOSCOPIC WEDGE RESECTION LUNG Right 8/2/2016    Procedure: THORACOSCOPIC WEDGE RESECTION LUNG;  Surgeon: Abdelrahman Noriega MD;  Location:  OR       ALLERGIES:    Allergies   Allergen Reactions     Sulfa Drugs Difficulty breathing     Adhesive Tape Blisters     Amiodarone Other (See Comments)     Developed pleural effusion     Penicillins Unknown     Reaction occurred as a child       MEDS:    Current Facility-Administered Medications:      0.9% sodium chloride BOLUS, 300 mL, Intravenous, Once PRN, Gaurang Swenson MD     acetaminophen (TYLENOL) tablet 650 mg, 650 mg, Oral, Q4H PRN, Gaurang Swenson MD     [START ON 5/19/2020] aspirin EC tablet 81 mg, 81 mg, Oral, Daily, Gaurang Swenson MD     atorvastatin (LIPITOR) tablet 40 mg, 40 mg, Oral, Daily, Gaurang Swenson MD     atropine injection 0.5 mg, 0.5 mg, Intravenous, Q5 Min PRN, Gaurang Swenson MD     [START ON 5/19/2020] clopidogrel (PLAVIX) tablet 75 mg, 75 mg, Oral, Daily, Gaurang Swenson MD     Continuing ACE inhibitor/ARB/ARNI from home medication list OR ACEinhibitor/ARB/ARNI order already placed during this visit, , Does not apply, DOES NOT GO TO [facility] Timinder Singh, MD     Continuing antiplatelet from home medication list OR antiplatelet order already placed during this visit, , Does not apply, DOES NOT GO TO [facility] Timinder Singh, MD     Continuing beta blocker from home medication list OR beta blocker order already placed during this visit, , Does not apply, DOES  NOT GO TO [facility] Timinder Singh, MD     Continuing statin from home medication list OR statin order already placed during this visit, , Does not apply, DOES NOT GO TO [facility] Timinder Singh, MD     docusate sodium (COLACE) capsule 100 mg, 100 mg, Oral, BID, Patrick Davis NP     [COMPLETED] fentaNYL (PF) (SUBLIMAZE) injection 50 mcg, 50 mcg, Intravenous, Once within 24 hrs, 50 mcg at 05/18/20 2036 **FOLLOWED BY** fentaNYL (PF) (SUBLIMAZE) injection 25 mcg, 25 mcg, Intravenous, Q5 Min PRN, Patrick Davis NP     fentaNYL (PF) (SUBLIMAZE) injection 25-50 mcg, 25-50 mcg, Intravenous, Q15 Min PRN, Gaurang Swenson MD, 50 mcg at 05/18/20 1727     flumazenil (ROMAZICON) injection 0.2 mg, 0.2 mg, Intravenous, q1 min prn, Gaurang Swenson MD     gabapentin (NEURONTIN) capsule 600 mg, 600 mg, Oral, QPM, Gaurang Swenson MD     Hold: metformin and metformin containing medications on day of the procedure and for 48 hours after IV contrast given- Patients with acute kidney injury or severe chronic kidney disease (stage IV or stage V; i.e., eGFR less than 30), , Does not apply, HOLD, Gaurang Swenson MD     lidocaine (LMX4) cream, , Topical, Q1H PRN, Gaurang Swenson MD     lidocaine 1 % 0.1-1 mL, 0.1-1 mL, Other, Q1H PRN, Gaurang Swenson MD     lidocaine 1 % 5 mL, 5 mL, Subcutaneous, Once PRN, Gaurang Swenson MD     magnesium hydroxide (MILK OF MAGNESIA) suspension 30 mL, 30 mL, Oral, Daily PRN, Patrick Davis NP     [COMPLETED] midazolam (VERSED) injection 1 mg, 1 mg, Intravenous, Once within 24 hrs, 1 mg at 05/18/20 2037 **FOLLOWED BY** midazolam (VERSED) injection 0.5 mg, 0.5 mg, Intravenous, Q4 Min PRN, Patrick Davis NP     midazolam (VERSED) injection 0.5-1 mg, 0.5-1 mg, Intravenous, Q5 Min PRN, Gaurang Swenson MD     naloxone (NARCAN) injection 0.1-0.4 mg, 0.1-0.4 mg, Intravenous, Q2 Min PRN, Gaurang Swenson MD      naloxone (NARCAN) injection 0.2-0.4 mg, 0.2-0.4 mg, Intravenous, Q2 Min PRN, Gaurang Swenson MD     nitroGLYcerin (NITROSTAT) sublingual tablet 0.4 mg, 0.4 mg, Sublingual, Q5 Min PRN, Gaurang Swenson MD     norepinephrine (LEVOPHED) 16 mg in  mL infusion, 0.03-0.4 mcg/kg/min, Intravenous, Continuous, Patrick Davis NP, Last Rate: 13.9 mL/hr at 05/18/20 2330, 0.2 mcg/kg/min at 05/18/20 2330     ondansetron (ZOFRAN-ODT) ODT tab 4 mg, 4 mg, Oral, Q6H PRN **OR** ondansetron (ZOFRAN) injection 4 mg, 4 mg, Intravenous, Q6H PRN, Patrick Davis NP, 4 mg at 05/18/20 2136     Percutaneous Coronary Intervention orders placed (this is information for BPA alerting), , Does not apply, DOES NOT GO TO [facility] Timinder Singh, MD     Percutaneous Coronary Intervention orders placed (this is information for BPA alerting), , Does not apply, DOES NOT GO TO [facility] Timinder Singh, MD     senna-docusate (SENOKOT-S/PERICOLACE) 8.6-50 MG per tablet 1 tablet, 1 tablet, Oral, BID PRN **OR** senna-docusate (SENOKOT-S/PERICOLACE) 8.6-50 MG per tablet 2 tablet, 2 tablet, Oral, BID PRN, Patrick Davis NP     sodium chloride (PF) 0.9% PF flush 3 mL, 3 mL, Intracatheter, q1 min prn, Gaurang Swenson MD     sodium chloride (PF) 0.9% PF flush 3 mL, 3 mL, Intracatheter, Q8H, Gaurang Swenson MD     sodium chloride 0.9% infusion, , Intravenous, Continuous, Karson Howard MD, Last Rate: 100 mL/hr at 05/18/20 2307    SOCIAL HABITS:   Social History    Substance and Sexual Activity      Alcohol use: Yes        Alcohol/week: 42.0 standard drinks        Types: 42 Standard drinks or equivalent per week        Comment: occ      History   Drug Use No      History   Smoking Status     Former Smoker     Packs/day: 1.00     Years: 30.00     Types: Cigarettes     Quit date: 7/26/2013   Smokeless Tobacco     Never Used        FAMILY HISTORY:    Family History   Problem Relation Age of  "Onset     Blood Disease Mother         Anemia     Cardiovascular Father      Cancer - colorectal Maternal Grandfather      Hypertension Brother      Diabetes Sister 5        Juvinile Diabetes passed at 36     Respiratory Other         Lung Cancer       PE:  /76   Pulse 107   Temp 97.9  F (36.6  C) (Oral)   Resp 28   Ht 1.702 m (5' 7\")   Wt 74.3 kg (163 lb 12.8 oz)   SpO2 96%   BMI 25.65 kg/m    Wt Readings from Last 1 Encounters:   05/18/20 74.3 kg (163 lb 12.8 oz)     Body mass index is 25.65 kg/m .    EXAM:  GENERAL: This is a well-developed 74 year old male who appears his stated age  EYES: Grossly normal.  MOUTH: Buccal mucosa normal   CARDIAC: Not evaluated.  CHEST/LUNG: Not evaluated.  GASTROINTESINAL (ABDOMEN): Small left groin hematoma but massive scrotal swelling.    MUSCULOSKELETAL: Grossly normal and both lower extremities are intact.  HEME/LYMPH: No lymphedema  NEUROLOGIC: Focally intact, Alert and oriented x 3.   PSYCH: appropriate affect  INTEGUMENT: No open lesions or ulcers.  Ecchymosis to scrotum and left groin    Pulse Exam:   Patient on pressors and with nonpalpable pulses.    DIAGNOSTIC STUDIES:     Images:  Cardiac Catheterization    Result Date: 5/18/2020  1.  Successful complex PCI with adjunctive rotational atherectomy with drug-eluting stents x4 from proximal to distal RCA 2.  Temporary pacemaker wire removed at end of case    Us Low Extremity Arterial Duplex Left Port    Result Date: 5/18/2020  EXAM: US LOWER EXTREMITY ARTERIAL DUPLEX LEFT PORTABLE LOCATION: [facility] DATE/TIME: 5/18/2020 10:04 PM INDICATION: Status post angiogram. Pain. Rule out bleeding. COMPARISON: None. TECHNIQUE: Duplex utilizing 2D gray-scale imaging, Doppler interrogation with color-flow and spectral waveform analysis. 1.  Findings: Ultrasound examination of the left groin shows no evidence for pseudoaneurysm or active bleeding. Ill-defined hypoechoic region in the left groin measuring 7.5 " x 4.6 cm probably ill-defined hematoma. The underlying common femoral artery is patent.    Cta Abdomen Pelvis With Contrast    Result Date: 5/18/2020  CTA ABDOMEN AND PELVIS WITH CONTRAST   5/18/2020 8:12 PM HISTORY: Rupture of artery. Post angiography with femoral approach, now large hematoma formation. TECHNIQUE:  82mL [id number]. CTA images were obtained before and after contrast administration.Radiation dose for this scan was reduced using automated exposure control, adjustment of the mA and/or kV according to patient size, or iterative reconstruction technique. Multi planar And Three-D reformatted images were created on a separate workstation. COMPARISON: April 2, 2020 FINDINGS: There is active extravasation from probably the distal external iliac artery vs. very proximal common femoral artery into the superficial tissues of the left groin, as well as into the inguinal canal which extends into the scrotum. The scrotum is markedly distended through the hematoma. No significant intra-abdominal extension. There are extensive atherosclerotic changes of the visualized aorta and its branches. There is no evidence of aortic dissection or aneurysm. There are no dilated loops of small intestine or large bowel to suggest ileus or obstruction. The liver, spleen, adrenal glands, kidneys, and pancreas demonstrate no worrisome focal lesion.     IMPRESSION: Active extravasation from the left common femoral artery into the left groin and extensive hemorrhage into the scrotum. The center of the compression device is approximately 4 cm caudal and 3 cm lateral to the area of active extravasation. [Critical Result: Active extravasation] Finding was identified on 5/18/2020 8:14 PM. Dr. Diez was contacted by me at 5/18/2020 8:32 PM and verbalized understanding of the critical finding. MARY WESLEY MD      I personally reviewed the images and my interpretation is that the CT demonstrated active bleeding from the mid common  femoral artery on the left side.  His completion duplex after ultrasound-guided pressure by me in the team showed resolution of active extravasation..    LABS:      Sodium   Date Value Ref Range Status   05/18/2020 141 133 - 144 mmol/L Final   05/18/2020 140 133 - 144 mmol/L Final   03/23/2020 139 133 - 144 mmol/L Final     Urea Nitrogen   Date Value Ref Range Status   05/18/2020 8 7 - 30 mg/dL Final   05/18/2020 8 7 - 30 mg/dL Final   03/23/2020 11 7 - 30 mg/dL Final     Hemoglobin   Date Value Ref Range Status   05/18/2020 12.1 (L) 13.3 - 17.7 g/dL Final   05/18/2020 10.6 (L) 13.3 - 17.7 g/dL Final   05/18/2020 12.4 (L) 13.3 - 17.7 g/dL Final     Platelet Count   Date Value Ref Range Status   05/18/2020 238 150 - 450 10e9/L Final   05/18/2020 261 150 - 450 10e9/L Final   05/18/2020 310 150 - 450 10e9/L Final     INR   Date Value Ref Range Status   05/18/2020 1.51 (H) 0.86 - 1.14 Final   05/18/2020 1.18 (H) 0.86 - 1.14 Final   03/23/2020 1.07 0.86 - 1.14 Final       Total time spent 120 minutes face to face with patient in an active care of the patient.    Marcia Worthy MD, MD  VASCULAR SURGERY

## 2020-05-19 NOTE — PLAN OF CARE
Alert and oriented X 4. L groin hematoma site stable, edges demarcated. LE ultrasound repeated, see results. Scrotal edema slightly improved, tender to touch. Last hg 9.0, MD aware. IVF's changed to 50 ml/hr. Pulses present via doppler. Endorses intermittent L chest twinges, cardiology aware. EKG unchanged. Trops elevated but trending down. BP stable but hypotensive at times, attempting to wean off Levophed gtt. A-fib RVR on tele, HR improved with Digoxin. O2 weaned. Voiding spontaneously. Urology consulted due to penile edema, no pedroza at this time. Wife Mariajose called and updated on POC. Continue to monitor.

## 2020-05-19 NOTE — PROVIDER NOTIFICATION
MD Notification    Notified Person: MD    Notified Person Name: Dr. Cronin    Notification Date/Time: 5/19/20 @ 0940    Notification Interaction: Verbal    Purpose of Notification: Trop elevated @ 0.802    Orders Received: No new orders at this time. EKG done.    Comments:

## 2020-05-19 NOTE — PROCEDURES
[facility]    Central line    Date/Time: 5/19/2020 6:37 AM  Performed by: Karson Howard MD  Authorized by: Karson Howard MD   Indications: vascular access (In the setting of shock with pressor requirements)      UNIVERSAL PROTOCOL   Site Marked: NA  Prior Images Obtained and Reviewed:  NA  Required items: Required blood products, implants, devices and special equipment available    Patient identity confirmed:  Verbally with patient  Patient was reevaluated immediately before administering moderate or deep sedation or anesthesia  Confirmation Checklist:  Patient's identity using two indicators, relevant allergies, procedure was appropriate and matched the consent or emergent situation and correct equipment/implants were available  Time out: Immediately prior to the procedure a time out was called    Universal Protocol: the Joint Commission Universal Protocol was followed    Preparation: Patient was prepped and draped in usual sterile fashion    ESBL (mL):  0.5     ANESTHESIA    Anesthesia: Local infiltration  Local Anesthetic:  Lidocaine 1% without epinephrine  Anesthetic Total (mL):  3      SEDATION    Patient Sedated: No      Preparation: skin prepped with 2% chlorhexidine  Skin prep agent dried: skin prep agent completely dried prior to procedure  Sterile barriers: all five maximum sterile barriers used - cap, mask, sterile gown, sterile gloves, and large sterile sheet  Hand hygiene: hand hygiene performed prior to central venous catheter insertion  Location details: right internal jugular  Patient position: Trendelenburg  Catheter type: triple lumen  Pre-procedure: landmarks identified  Ultrasound guidance: yes  Sterile ultrasound techniques: sterile gel and sterile probe covers were used  Number of attempts: 1  Successful placement: yes  Post-procedure: line sutured and dressing applied  Assessment: blood return through all ports,  free fluid flow,  placement  verified by x-ray and no pneumothorax on x-ray      PROCEDURE    Patient Tolerance:  Patient tolerated the procedure well with no immediate complications  Length of time physician/provider present for 1:1 monitoring during sedation: 0

## 2020-05-19 NOTE — PLAN OF CARE
Central line placed per MD. Left groin hematoma unchanged from previous note. Pulses dopplerable. Left knee immobilizer in place. Pt remains flat and turning side to side. Pain at a 2 out of 10. Will continue to monitor.

## 2020-05-19 NOTE — PLAN OF CARE
Pt from cath lab post cardiac cath and stenting. Pt with hematoma to left groin and swelling of the scrotum and penis. Pt hypotensive and is on levophed via PIV.   Dr. Worthy at bedside. Fem stop removed and direct pressure applied to site. Duplex image ordered and shows no bleeding at this time. Will continue to monitor.

## 2020-05-19 NOTE — PROGRESS NOTES
Critical Care Progress Note  05/19/2020    Name: Efrem Ramos MRN#: [id number]   Age: 74 year old YOB: 1945     \[facility]\"" Day# 1           Problem List:   Principal Problem:    Coronary artery disease involving native coronary artery of native heart without angina pectoris  Active Problems:    CAD (coronary artery disease)-moderate  nonobstructive 2 vessel    Status post coronary angiogram    Bleeding           Summary/Hospital Course:   74 year old male with history of MEL, recent shingles, PVD, HLD, CVA 2015, A fib, severe aortic stenosis, and mild to moderate mitral stenosis admitted 5/18/2020 for elective RCA catheterization with stent placement in anticipation of TAVR. Post procedure, he developed persistent cath site bleeding and massive hematoma. He was also hypotensive, and was transferred to the ICU. He was transfused 3 units PRBCs. CT contrast was done, which found active extravasation in the common iliac vs proximal common femoral. Femstop was repositioned by vascular surgery. He had persistent pressor needs after ICU admission, and central line was placed on 5/19 in the early morning.       Assessment and plan :     Efrem Ramos is a 74 year old male admitted on 5/18/2020 for RCA catheterization and stent placement in anticipation of TAVR, with course complicated by post op bleeding/hematoma and shock state requiring pressors, in the setting of a history of MEL, recent shingles, PVD, HLD, CVA 2015, A fib, severe aortic stenosis, and mild to moderate mitral stenosis.  I have personally reviewed the daily labs, imaging studies, cultures and discussed the case with referring physician and consulting physicians.   My assessment and plan by system for this patient is as follows:    Neurology/Psychiatry:   1. Analgesia  Plan  -Fentanyl PRN   -gabapentin 600 mg nightly     Cardiovascular:   1. Single vessel CAD (RCA) s/p 4 stents on 5/18, complicated by cath site bleeding/hematoma  2. Severe  Aortic stenosis  3. Mild-moderate mitral stenosis  4. Atrial fibrillation with RVR  5. Shock state, acute blood loss vs undifferentiated vs vasoplegia.   Plan  -appreciate cardiology assistance  -Continue norepinephrine, wean as able  -Follow serial hemoglobin q4h  -holding eliquis given bleeding.  -given digoxin 125 mcg today (received 250 mcg last night) for rate control, plan to resume home metoprolol tomorrow if stable   -Trend troponin   -Unclear timing on plan for TAVR  -holding metoprolol until tomorrow.   -asa 81 mg daily  -clopidogrel 75 mg daily   -atorvastatin 40 mg dialy     Pulmonary/Ventilator Management:   1. Acute hypoxic respiratory insufficiency  2. 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 Emphysema on imaging: not on inhalers as outpatient  Plan  - wean supplemental O2 as able.   -albuterol PRN    GI/Nutrition :   1.  Nutrition:  Plan  - start cardiac diet  -docusate 100 mg BID    Renal/Fluids/Electrolytes/:  1. Penile and scrotal edema and hematoma: initially had difficulty urinating, now has been able to pass urine. Bravo unable to be placed overnight.   Plan  - Urology consulted, appreciate assistance  - monitor function and electrolytes as needed with replacement per ICU protocols.  - generally avoid nephrotoxic agents such as NSAID, IV contrast unless specifically required  - adjust medications as needed for renal clearance  - follow I/O's as appropriate.  - Reduce LR to 50 mL/hr, with plan to stop if PO intake improves    Infectious Disease:    1. No acute issues  Plan:  -monitor    Endocrine:   1. No acute issues  Plan  - ICU insulin protocol, goal sugar <180    Hematology/Oncology:   1.  Acute blood loss anemia with hemodynamic instability   Plan  - Pressors as above, wean as able  -Hgb goal >8 per cardiology  -follow Hgb q4h  -Holding eliquis with bleeding  -Dual antiplatelet therapy with aspirin and plavix for recent heart stents (placed  5/18)    MSK/Rheum:  1. No acute issues  Plan:  -monitor    IV/Access:   1. Venous access - right internal jugular CVC   2. Arterial access - none  3.  Plan  - central access required and necessary    ICU Prophylaxis:   1. DVT: mechanical  2. VAP: Not indicated  3. Stress Ulcer: Not indicated  4. Restraints: Not indicated  5. Wound care - per unit routine   6. Feeding - advance to cardiac diet  7. Family Update: done overnight   8. Disposition - remain in ICU given pressor requirement    Key goals for next 24 hours:   1. Wean pressors as able  2.    Urology consult  3.    Follow Hgb        Interim History/Overnight Events:   Doing okay. Had some chest pain in left lower chest this AM. EKG unchanged. No fevers or chills. No shortness of breath. No nausea. Some lower abdominal/groin pain due to swelling. No LE edema. No other complaints currently. Feels much better than last night.        Key Medications:       aspirin  81 mg Oral Daily     atorvastatin  40 mg Oral Daily     clopidogrel  75 mg Oral Daily     docusate sodium  100 mg Oral BID     gabapentin  600 mg Oral QPM     sodium chloride (PF)  3 mL Intracatheter Q8H       - MEDICATION INSTRUCTIONS -       - MEDICATION INSTRUCTIONS -       - MEDICATION INSTRUCTIONS -       - MEDICATION INSTRUCTIONS -       lactated ringers 100 mL/hr at 05/19/20 0113     norepinephrine 0.15 mcg/kg/min (05/19/20 0633)     Percutaneous Coronary Intervention orders placed (this is information for BPA alerting)       Percutaneous Coronary Intervention orders placed (this is information for BPA alerting)       ACE/ARB/ARNI NOT PRESCRIBED                 Physical Examination:   Temp:  [97.6  F (36.4  C)-98.3  F (36.8  C)] 98.3  F (36.8  C)  Pulse:  [] 134  Heart Rate:  [] 161  Resp:  [0-39] 15  BP: ()/() 107/69  SpO2:  [30 %-100 %] 100 %    Intake/Output Summary (Last 24 hours) at 5/19/2020 0750  Last data filed at 5/19/2020 0600  Gross per 24 hour   Intake 2819.91  ml   Output 500 ml   Net 2319.91 ml     Wt Readings from Last 4 Encounters:   05/19/20 76.9 kg (169 lb 8.5 oz)   05/13/20 73.9 kg (163 lb)   04/17/20 71.7 kg (158 lb)   03/23/20 74.9 kg (165 lb 3.2 oz)     BP - Mean:  [] 81  Resp: 15    No lab results found in last 7 days.    GEN: no acute distress, lying in bed.   HEENT: head ncat, sclera anicteric, trachea midline   PULM: unlabored breathing. No crackles. On NC o2.   CV/COR: irregularly irregular, soft systolic murmur. Tachycardic.   ABD: soft. TTP in lower abdomen bilaterally. Hypoactive bowel sounds.   MSK/EXT:  Bruising at L iliac crest anteriorly, mild on right. TTP. No LE edema noted  NEURO: grossly intact. Speech/language wnl.   SKIN: no obvious rash. Bruising in left iliac crest as well as scrotum and penis   : significant edema and hematoma present in scrotum, with tense edema. Penis with tense hematoma/edema as well.   LINES: clean, dry intact         Data:   All data and imaging reviewed.     ROUTINE ICU LABS (Last four results)  CMP  Recent Labs   Lab 05/19/20  0506 05/18/20 2025 05/18/20  0831   NA  --  141 140   POTASSIUM  --  4.3 3.9   CHLORIDE  --  111* 109   CO2  --  20 24   ANIONGAP  --  10 7   GLC  --  155* 84   BUN  --  8 8   CR  --  0.86 0.84   GFRESTIMATED  --  84 84   GFRESTBLACK  --  >90 >90   ULISSES  --  8.8 8.5   MAG 2.4* 1.8  --    PHOS 4.6*  --   --      CBC  Recent Labs   Lab 05/19/20  0506 05/19/20  0112 05/18/20 2025 05/18/20  1935 05/18/20  0831   WBC  --   --  21.4* 23.8* 11.9*   RBC  --   --  3.80* 3.21* 3.77*   HGB 11.8* 11.6* 12.1* 10.6* 12.4*   HCT  --   --  36.7* 32.7* 38.2*   MCV  --   --  97 102* 101*   MCH  --   --  31.8 33.0 32.9   MCHC  --   --  33.0 32.4 32.5   RDW  --   --  16.8* 15.2* 14.9   PLT  --   --  238 261 310     INR  Recent Labs   Lab 05/18/20 2025 05/18/20  0831   INR 1.51* 1.18*     Arterial Blood GasNo lab results found in last 7 days.    All cultures:  No results for input(s): CULT in the last 168  hours.  Recent Results (from the past 24 hour(s))   Cardiac Catheterization    Narrative    1.  Successful complex PCI with adjunctive rotational atherectomy with   drug-eluting stents x4 from proximal to distal RCA  2.  Temporary pacemaker wire removed at end of case   CTA Abdomen Pelvis with Contrast   Result Value    Radiologist flags Active extravasation (AA)    Narrative    CTA ABDOMEN AND PELVIS WITH CONTRAST   5/18/2020 8:12 PM     HISTORY: Rupture of artery. Post angiography with femoral approach,  now large hematoma formation.    TECHNIQUE:  82mL [id number]. CTA images were obtained before and after  contrast administration.Radiation dose for this scan was reduced using  automated exposure control, adjustment of the mA and/or kV according  to patient size, or iterative reconstruction technique. Multi planar  And Three-D reformatted images were created on a separate workstation.    COMPARISON: April 2, 2020    FINDINGS: There is active extravasation from probably the distal  external iliac artery vs. very proximal common femoral artery into the  superficial tissues of the left groin, as well as into the inguinal  canal which extends into the scrotum. The scrotum is markedly  distended through the hematoma. No significant intra-abdominal  extension. There are extensive atherosclerotic changes of the  visualized aorta and its branches. There is no evidence of aortic  dissection or aneurysm. There are no dilated loops of small intestine  or large bowel to suggest ileus or obstruction. The liver, spleen,  adrenal glands, kidneys, and pancreas demonstrate no worrisome focal  lesion.       Impression    IMPRESSION: Active extravasation from the left common femoral artery  into the left groin and extensive hemorrhage into the scrotum. The  center of the compression device is approximately 4 cm caudal and 3 cm  lateral to the area of active extravasation.    [Critical Result: Active extravasation]    Finding was  identified on 5/18/2020 8:14 PM.     Dr. Diez was contacted by me at 5/18/2020 8:32 PM and  verbalized understanding of the critical finding.     MARY WESLEY MD   US Low Extremity Arterial Duplex Left Port    Narrative    EXAM: US LOWER EXTREMITY ARTERIAL DUPLEX LEFT PORTABLE  LOCATION: [facility]  DATE/TIME: 5/18/2020 10:04 PM    INDICATION: Status post angiogram. Pain. Rule out bleeding.  COMPARISON: None.  TECHNIQUE: Duplex utilizing 2D gray-scale imaging, Doppler interrogation with color-flow and spectral waveform analysis.    1.  Findings: Ultrasound examination of the left groin shows no evidence for pseudoaneurysm or active bleeding. Ill-defined hypoechoic region in the left groin measuring 7.5 x 4.6 cm probably ill-defined hematoma. The underlying common femoral artery is   patent.   XR Chest Port 1 View    Narrative    EXAM: XR CHEST PORT 1 VW  LOCATION: [facility]  DATE/TIME: 5/19/2020 6:10 AM    INDICATION: Central line placement.  COMPARISON: None.      Impression    IMPRESSION: Right-sided central line with tip at the cavoatrial junction. No focal airspace disease or pneumothorax. Emphysema. Right upper lung scarring. Normal heart size.                          Billing: This patient is critically ill: Yes, due to shock state requiring pressors. Total critical care time today 45 min.    Gerardo Cronin MD    Department of Medicine, Division of Pulmonary, Allergy, Critical Care, and Sleep Medicine  Pager: [phone]

## 2020-05-19 NOTE — CONSULTS
[facility]    Cardiology Consultation     Date of Admission:  5/18/2020    Assessment & Plan   Efrem Ramos is a 74 year old male who was admitted on 5/18/2020.    Postprocedural shock likely hemorrhagic  Moderate to severe aortic stenosis awaiting transcatheter aortic valve implant as an outpatient  Severe single-vessel right coronary artery disease status post 4 stents today  History of atrial fibrillation  Peripheral vascular disease    Plan  1. Patient is in shock and appears pale with low blood pressures.  After a unit of blood and little bit of epi systolic blood pressures came up from 60 up to 77.  The patient is conversive and alert.  2. Groin site looks 'stable' with the FemStop in place. Will confirm on CT  3. Patient transferred to stat CT  4. ICU and vascular teams have been updated.  They are awaiting CT results. Dr. Worthy should be here to assess the patient with US soon.   5. Patient will be transferred to the ICU for transfusion protocol.  6. Family has been updated    Please call cardiology overnight with questions. Critical care time of 60 minutes spent with the patient and coordinating care.    Anton Calvillo MD    Primary Care Physician   Danny Paige MD    Reason for Consult   Reason for consult: I was asked by medicine team and care suites stat to evaluate this patient for severe hypotension post PCI.    History of Present Illness   Efrem Ramos is a 74 year old male who presents as an outpatient for elective right coronary artery stenting.  The patient has a history of moderate to severe aortic stenosis awaiting outpatient transcatheter aortic valve implantation.  He came in today for an elective outpatient PCI to his right coronary artery.  He has a history of peripheral vascular disease.  Left femoral access was taken with a 6 Indonesian catheter in the femoral artery and femoral vein for temporary pacemaker wire as well.  Patient had successful 4 stents placed  to his right coronary artery.  Post procedurally did okay until 5 PM today.  After the sheath came out there was a large hematoma which initially resolved with manual pressure however upon releasing manual pressure the hematoma developed very quickly and associated with severe hypotension with systolic blood pressures dropping into the 60s.  Massive scrotal swelling was also very evident that happened quite acutely.  Rapid response was called and cardiology was paged.    Upon my arrival hospitalist team was already there and appropriately initiated blood transfusion.  The patient was obviously appearing pale.  He was conversive mentating well and talking in full sentences without any respiratory distress.  Time stop seem to be in reasonable position.  Patient denied any chest pain or shortness of breath.  Last ACT was 170.  Vascular surgery has been paged and ICU team has been informed.    Patient Active Problem List   Diagnosis     Encephalopathy     Mixed hyperlipidemia     Other specified disorder of skin     MEL (obstructive sleep apnea)     PVD (peripheral vascular disease) (H)     Decreased diffusion capacity     Anemia     Vocal cord cancer (H)     Lower extremity edema     Lymphocytic colitis     Mitral valve problem     IgG monoclonal gammopathy     CARDIOVASCULAR SCREENING; LDL GOAL LESS THAN 100     Vitamin B12 deficiency disease     Elevated ferritin     Collagenous colitis     Redundant colon     Rash and nonspecific skin eruption     Cerebral infarction (H)     Alcohol abuse     Atrial fibrillation and flutter (H)     Health Care Home     Atrial fibrillation (H)     Hypoxia     Acute respiratory failure with hypoxia (H)     Physical deconditioning     Urinary retention     Community acquired pneumonia     Constipation     Pneumonia     CAD (coronary artery disease)-moderate  nonobstructive 2 vessel     Hypokalemia     Non-traumatic compression fracture of L1 lumbar vertebra with routine healing,  subsequent encounter     Age-related osteoporosis with current pathological fracture with routine healing, subsequent encounter     Benign essential hypertension     Coronary artery disease involving native coronary artery of native heart with angina pectoris (H)     Other secondary pulmonary hypertension (H)     Edema due to malnutrition, due to unspecified malnutrition type (H)     Abnormal findings on diagnostic imaging of heart and coronary circulation     Coronary artery disease involving native coronary artery of native heart without angina pectoris     Status post coronary angiogram       Past Medical History   I have reviewed this patient's medical history and updated it with pertinent information if needed.   Past Medical History:   Diagnosis Date     Atrial fibrillation (H)      Benign essential hypertension 11/20/2018     Cancer (H) vocal cord     Carpal tunnel syndrome     abstracted 7/3/02.     CVA (cerebral infarction) 5/5/2015     Demyelinating disease of central nervous system, unspecified (H)     abstracted 7/3/02.     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     Pneumonia 8/17/2016     PVD (peripheral vascular disease) (H)      Redundant colon     needs CT colonography     S/P coronary angiogram 09/05/2017     Shingles      SKIN DISORDERS NEC 3/15/2005     Sleep apnea        Past Surgical History   I have reviewed this patient's surgical history and updated it with pertinent information if needed.  Past Surgical History:   Procedure Laterality Date     BIOPSY  brain  2002     BONE MARROW BIOPSY, BONE SPECIMEN, NEEDLE/TROCAR N/A 6/8/2017    Procedure: BIOPSY BONE MARROW;  UNILATERAL BONE MARROW BIOPSY (CONSCIOUS SEDATION) ;  Surgeon: Jamie Gonzales MD;  Location:  GI     C NONSPECIFIC PROCEDURE  as a child    T & A. abstracted 7/3/02.     C NONSPECIFIC PROCEDURE  early    CTR. abstracted 7/3/02.     CARDIAC CATHERIZATION  09/05/2017    2V CAD, IFR of RCA 0.95     CV CORONARY ANGIOGRAM N/A 3/23/2020    Procedure: Coronary Angiogram and right heart cath;  Surgeon: Gino Ferrer MD;  Location:  HEART CARDIAC CATH LAB     CV INSTANTANEOUS WAVE-FREE RATIO N/A 3/23/2020    Procedure: Instantaneous Wave-Free Ratio;  Surgeon: Gino Ferrer MD;  Location:  HEART CARDIAC CATH LAB     ESOPHAGOSCOPY, GASTROSCOPY, DUODENOSCOPY (EGD), COMBINED N/A 9/8/2018    Procedure: COMBINED ESOPHAGOSCOPY, GASTROSCOPY, DUODENOSCOPY (EGD), BIOPSY SINGLE OR MULTIPLE;;  Surgeon: Xander Marie MD;  Location:  GI     HEAD & NECK SURGERY       ORTHOPEDIC SURGERY      right arm ulna reset after injury     THORACOSCOPIC WEDGE RESECTION LUNG Right 8/2/2016    Procedure: THORACOSCOPIC WEDGE RESECTION LUNG;  Surgeon: Abdelrahman Noriega MD;  Location:  OR       Prior to Admission Medications   Prior to Admission Medications   Prescriptions Last Dose Informant Patient Reported? Taking?   ASPIRIN PO 5/17/2020 at Unknown time Self Yes Yes   Sig: Take 81 mg by mouth every evening    Cyanocobalamin 2000 MCG TABS Unknown at Unknown time Self Yes No   Sig: Take 2,000 mcg by mouth every 7 days On Sundays   acetaminophen (TYLENOL) 325 MG tablet Past Month at Unknown time  Yes Yes   Sig: Take 325-650 mg by mouth every 6 hours as needed for mild pain   apixaban ANTICOAGULANT (ELIQUIS) 5 MG tablet 5/17/2020 at Unknown time  No Yes   Sig: Take 1 tablet (5 mg) by mouth 2 times daily   atorvastatin (LIPITOR) 40 MG tablet 5/17/2020 at Unknown time  No Yes   Sig: Take 1 tablet (40 mg)  by mouth daily   calcium carbonate 600 mg-vitamin D 400 units (CALTRATE) 600-400 MG-UNIT per tablet 5/17/2020 at Unknown time  Yes Yes   Sig: Take 1 tablet by mouth 2 times daily   furosemide (LASIX) 20 MG tablet Past Week at Unknown time  No Yes   Sig: Take 1 tablet (20 mg) by mouth daily   gabapentin (NEURONTIN) 300 MG capsule 5/17/2020 at Unknown time  No Yes   Sig: Take 2 capsules (600 mg) by mouth every evening   isosorbide mononitrate (IMDUR) 30 MG 24 hr tablet not started yet  No No   Sig: Take 0.5 tablets (15 mg) by mouth daily   loperamide (IMODIUM) 2 MG capsule Unknown at Unknown time  Yes No   Sig: Take 2 mg by mouth 4 times daily as needed for diarrhea   melatonin 1 MG TABS tablet 5/17/2020 at Unknown time  Yes Yes   Sig: Take 1-3 mg by mouth nightly as needed for sleep   metoprolol succinate ER (TOPROL-XL) 25 MG 24 hr tablet 5/17/2020 at Unknown time  Yes Yes   Sig: Take 37.5 mg by mouth 1/2 tablet in am , and 1 tablet in pm   multivitamin (OCUVITE) TABS 5/17/2020 at Unknown time Self Yes Yes   Sig: Take 1 tablet by mouth 2 times daily    polyethylene glycol (MIRALAX/GLYCOLAX) powder Unknown at Unknown time  Yes No   Sig: Take 1 capful by mouth daily as needed for constipation   potassium chloride ER 20 MEQ PO CR tablet 5/17/2020 at Unknown time  No Yes   Sig: Take 1 tablet (20 mEq) by mouth daily   valACYclovir (VALTREX) 1000 mg tablet   No No   Sig: TAKE 1 TABLET(1000 MG) BY MOUTH THREE TIMES DAILY FOR 7 DAYS      Facility-Administered Medications: None     Current Facility-Administered Medications   Medication Dose Route Frequency     aspirin  81 mg Oral Daily     [START ON 5/19/2020] aspirin  81 mg Oral Daily     atorvastatin  40 mg Oral Daily     calcium chloride  1 g Intravenous Once     [START ON 5/19/2020] clopidogrel  75 mg Oral Daily     gabapentin  600 mg Oral QPM     iopamidol  82 mL Intravenous Once     sodium chloride 0.9 %  63 mL Intravenous Once     sodium chloride (PF)  3 mL  "Intracatheter Q8H     Current Facility-Administered Medications   Medication Last Rate     - MEDICATION INSTRUCTIONS -       - MEDICATION INSTRUCTIONS -       - MEDICATION INSTRUCTIONS -       - MEDICATION INSTRUCTIONS -       norepinephrine       Percutaneous Coronary Intervention orders placed (this is information for BPA alerting)       Percutaneous Coronary Intervention orders placed (this is information for BPA alerting)       Allergies   Allergies   Allergen Reactions     Sulfa Drugs Difficulty breathing     Adhesive Tape Blisters     Amiodarone Other (See Comments)     Developed pleural effusion     Penicillins Unknown     Reaction occurred as a child       Social History    reports that he quit smoking about 6 years ago. His smoking use included cigarettes. He has a 30.00 pack-year smoking history. He has never used smokeless tobacco. He reports current alcohol use of about 42.0 standard drinks of alcohol per week. He reports that he does not use drugs.    Family History   Family History   Problem Relation Age of Onset     Blood Disease Mother         Anemia     Cardiovascular Father      Cancer - colorectal Maternal Grandfather      Hypertension Brother      Diabetes Sister 5        Juvinile Diabetes passed at 36     Respiratory Other         Lung Cancer       Review of Systems   The comprehensive 10 point Review of Systems is negative other than noted in the HPI or here.     Physical Exam   Vital Signs with Ranges  Temp:  [97.9  F (36.6  C)] 97.9  F (36.6  C)  Pulse:  [] 82  Heart Rate:  [] 83  Resp:  [8-23] 11  BP: ()/(47-92) 99/74  SpO2:  [94 %-100 %] 94 %  Wt Readings from Last 4 Encounters:   05/18/20 74.3 kg (163 lb 12.8 oz)   05/13/20 73.9 kg (163 lb)   04/17/20 71.7 kg (158 lb)   03/23/20 74.9 kg (165 lb 3.2 oz)     No intake/output data recorded.      Vitals: BP 99/74   Pulse 82   Temp 97.9  F (36.6  C) (Oral)   Resp 11   Ht 1.702 m (5' 7\")   Wt 74.3 kg (163 lb 12.8 oz)   " SpO2 94%   BMI 25.65 kg/m      General the patient is alert oriented x3 in no acute distress.  He appears pale.  No icterus.  Respirations are normal.  Lungs are mild basilar crackles.  Ejection systolic murmur.  No edema in the extremities.  Large scrotal swelling.  Hematoma on the left side which is resolved with the FemoStop.      No lab results found in last 7 days.    Invalid input(s): TROPONINIES    Recent Labs   Lab 05/18/20 1935 05/18/20  0831   WBC 23.8* 11.9*   HGB 10.6* 12.4*   * 101*    310   INR  --  1.18*   NA  --  140   POTASSIUM  --  3.9   CHLORIDE  --  109   CO2  --  24   BUN  --  8   CR  --  0.84   GFRESTIMATED  --  84   GFRESTBLACK  --  >90   ANIONGAP  --  7   ULISSES  --  8.5   GLC  --  84     Recent Labs   Lab Test 06/24/19  1023 05/30/18  1204  09/04/15  1040 05/29/15  1203   CHOL 118 146   < > 162 189   HDL 51 61   < > 59 80   LDL 51 67   < > 83 93   TRIG 80 90   < > 100 80   CHOLHDLRATIO  --   --   --  2.7 2.4    < > = values in this interval not displayed.     Recent Labs   Lab 05/18/20 1935 05/18/20  0831   WBC 23.8* 11.9*   HGB 10.6* 12.4*   HCT 32.7* 38.2*   * 101*    310     No results for input(s): PH, PHV, PO2, PO2V, SAT, PCO2, PCO2V, HCO3, HCO3V in the last 168 hours.  No results for input(s): NTBNPI, NTBNP in the last 168 hours.  No results for input(s): DD in the last 168 hours.  No results for input(s): SED, CRP in the last 168 hours.  Recent Labs   Lab 05/18/20 1935 05/18/20  0831    310     No results for input(s): TSH in the last 168 hours.  No results for input(s): COLOR, APPEARANCE, URINEGLC, URINEBILI, URINEKETONE, SG, UBLD, URINEPH, PROTEIN, UROBILINOGEN, NITRITE, LEUKEST, RBCU, WBCU in the last 168 hours.    Imaging:  Recent Results (from the past 48 hour(s))   Cardiac Catheterization    Narrative    1.  Successful complex PCI with adjunctive rotational atherectomy with   drug-eluting stents x4 from proximal to distal RCA  2.  Temporary  pacemaker wire removed at end of case       Echo:  No results found for this or any previous visit (from the past 4320 hour(s)).

## 2020-05-19 NOTE — PLAN OF CARE
Pt in A-fib with rates going up to the 180's. One time dose of digoxin ordered and given. Continue to monitor.

## 2020-05-19 NOTE — PROGRESS NOTES
Assessment and Plan:        Efrem Ramos is a 74 year old male who was admitted on 5/18/2020.     Postprocedural shock likely hemorrhagic  Moderate to severe aortic stenosis awaiting transcatheter aortic valve implant as an outpatient  Severe single-vessel right coronary artery disease status post 4 stents today  History of atrial fibrillation on Eliquis at home  Peripheral vascular disease     Groin site looks 'stable' this am. Appreciate vascular assistance last evening. L CFA bleed actively that was stopped with pressure. Artery patent on doppler last night. No surgery was needed. Hemodynamically improved this am. Coming off NE. Not SOB. Hgb is stable.      Plan and recommendations -   1. Continue weaning NE as able per ICU.   2. Continue serial trending Hgb still and follow up with vascular  3. Continue DAPT with aspirin and Plavix.   4. Hold Eliquis for the time being. Once stable in a week or two, can switch to Plavix DOAC combination or as an when deemed safe by vascular.   5. He is in AF with RVR episodically from acute issues including pressor need and BB withdrawal. Has some MS with elevated gradients at higher HRs. Reasonable to use short term Dig 0.125 mg daily (got one dose last night) but switch to home metoprolol tomorrow if stable hemodynamically and stop digoxin.   6. Check Creatinine today  7. Cautiously watch volume status with hydration. Call cardiology with worsening SOB. Has non specific pain on the left side. Troponin was drawn this am at 0.8. Continue to trend, call cardiology if worsening.    Dr. Pinto's CR inpatient team will follow this afternoon. I am covering this am for this patient.    MD Anton Griffith MD        Interval History:   No overnight issues. Feeling OK this am. HR was a bit fast overnight in AF given that he is on NE and his home BP was held and that he has acute anemia with bleeding and pain. Atypical chest wall pain this am.            Medications:       aspirin  81 mg Oral Daily     atorvastatin  40 mg Oral Daily     clopidogrel  75 mg Oral Daily     docusate sodium  100 mg Oral BID     gabapentin  600 mg Oral QPM     sodium chloride (PF)  3 mL Intracatheter Q8H            Physical Exam:     Patient Vitals for the past 24 hrs:   BP Temp Temp src Pulse Heart Rate Resp SpO2 Weight   05/19/20 0800 121/77 98.5  F (36.9  C) Oral 89 118 14 97 % --   05/19/20 0745 106/63 -- -- 142 156 19 94 % --   05/19/20 0730 (!) 122/109 -- -- 126 140 12 96 % --   05/19/20 0715 (!) 117/99 -- -- 158 128 15 100 % --   05/19/20 0700 (!) 86/67 -- -- 141 140 10 99 % --   05/19/20 0645 107/69 -- -- 134 161 15 100 % --   05/19/20 0630 (!) 161/86 -- -- 86 89 22 98 % --   05/19/20 0615 (!) 111/106 -- -- 130 160 12 98 % --   05/19/20 0600 (!) 107/92 -- -- 135 161 18 100 % --   05/19/20 0500 (!) 89/54 -- -- 138 161 18 100 % --   05/19/20 0400 94/61 98.3  F (36.8  C) Oral 129 140 18 100 % --   05/19/20 0300 (!) 87/67 -- -- 149 149 (!) 0 100 % --   05/19/20 0200 (!) 76/66 -- -- 141 184 22 99 % 76.9 kg (169 lb 8.5 oz)   05/19/20 0100 (!) 82/60 -- -- 160 179 12 100 % --   05/19/20 0000 (!) 87/68 98.3  F (36.8  C) Oral 151 152 21 99 % --   05/18/20 2300 (!) 87/53 -- -- 142 160 (!) 0 (!) 87 % --   05/18/20 2200 100/63 -- -- 110 109 22 91 % --   05/18/20 2100 -- 97.6  F (36.4  C) Oral 54 -- 14 96 % --   05/18/20 2015 105/70 97.6  F (36.4  C) Oral 100 108 28 100 % --   05/18/20 2013 114/76 -- -- 107 -- 28 -- --   05/18/20 2000 102/75 -- -- 132 -- 14 96 % --   05/18/20 1900 (!) 47/36 -- -- 90 97 (!) 39 (!) 30 % --   05/18/20 1835 (!) 71/50 -- -- 84 84 20 98 % --   05/18/20 1830 (!) 60/43 -- -- 88 100 21 97 % --   05/18/20 1825 (!) 61/42 -- -- 76 89 23 99 % --   05/18/20 1815 99/74 -- -- 82 83 11 94 % --   05/18/20 1810 (!) 73/60 -- -- 93 88 16 94 % --   05/18/20 1800 (!) 83/60 -- -- 86 87 16 95 % --   05/18/20 1755 (!) 84/64 -- -- 89 91 16 95 % --   05/18/20 1750 91/58 -- -- 83 83 16 97  % --   05/18/20 1745 92/63 -- -- 83 95 16 97 % --   05/18/20 1740 110/74 -- -- 99 88 16 98 % --   05/18/20 1735 117/79 -- -- 88 96 16 95 % --   05/18/20 1730 114/82 -- -- -- 87 15 97 % --   05/18/20 1715 116/79 -- -- 90 89 17 97 % --   05/18/20 1700 111/74 -- -- 83 93 17 98 % --   05/18/20 1645 104/78 -- -- 84 80 11 96 % --   05/18/20 1630 (!) 114/92 -- -- 91 72 12 99 % --   05/18/20 1615 108/80 -- -- 84 84 8 100 % --   05/18/20 1600 118/47 -- -- 87 72 17 98 % --   05/18/20 1545 92/70 -- -- 70 82 15 97 % --   05/18/20 1530 91/67 -- -- 75 73 23 98 % --   05/18/20 1515 97/68 -- -- 64 65 17 97 % --   05/18/20 1500 (!) 75/51 -- -- 74 68 14 97 % --   05/18/20 1430 96/72 -- -- 73 66 16 99 % --   05/18/20 1415 (!) 83/61 -- -- 78 60 22 98 % --   05/18/20 1400 (!) 87/70 -- -- 66 71 11 98 % --   05/18/20 1330 (!) 89/65 -- -- 70 73 19 99 % --   05/18/20 1315 100/69 -- -- 71 70 17 98 % --   05/18/20 1300 -- -- -- -- 149 18 95 % --   05/18/20 1245 104/66 -- -- -- 69 18 97 % --     Vitals:    05/18/20 0807 05/19/20 0200   Weight: 74.3 kg (163 lb 12.8 oz) 76.9 kg (169 lb 8.5 oz)         Intake/Output Summary (Last 24 hours) at 5/19/2020 0929  Last data filed at 5/19/2020 0800  Gross per 24 hour   Intake 3042.64 ml   Output 675 ml   Net 2367.64 ml       05/14 0700 - 05/19 0659  In: 2819.91 [I.V.:1319.91]  Out: 500 [Urine:500]  Net: 2319.91    Exam:  GENERAL APPEARANCE ADULT: Alert, in no acute distress  RESP: lungs clear to auscultation   CV: Irregular, RVR, ESM  ABDOMEN: no guarding or rebound  EXTREMITIES: No edema, groin has large swelling in the scrotum, L groin swelling is minimal. No signs of worsening bleeding         Data:   LABS (Last four results)  CMP  Recent Labs   Lab 05/19/20  0506 05/18/20 2025 05/18/20  0831   NA  --  141 140   POTASSIUM  --  4.3 3.9   CHLORIDE  --  111* 109   CO2  --  20 24   ANIONGAP  --  10 7   GLC  --  155* 84   BUN  --  8 8   CR  --  0.86 0.84   GFRESTIMATED  --  84 84   GFRESTBLACK  --  >90  >90   ULISSES  --  8.8 8.5   MAG 2.4* 1.8  --    PHOS 4.6*  --   --      CBC  Recent Labs   Lab 05/19/20  0506 05/19/20  0112 05/18/20 2025 05/18/20  1935 05/18/20  0831   WBC  --   --  21.4* 23.8* 11.9*   RBC  --   --  3.80* 3.21* 3.77*   HGB 11.8* 11.6* 12.1* 10.6* 12.4*   HCT  --   --  36.7* 32.7* 38.2*   MCV  --   --  97 102* 101*   MCH  --   --  31.8 33.0 32.9   MCHC  --   --  33.0 32.4 32.5   RDW  --   --  16.8* 15.2* 14.9   PLT  --   --  238 261 310     INR  Recent Labs   Lab 05/18/20 2025 05/18/20  0831   INR 1.51* 1.18*     TROPONINS   Lab Results   Component Value Date    TROPI 0.027 09/05/2018    TROPI  07/27/2016     <0.015  The 99th percentile for upper reference range is 0.045 ug/L.  Troponin values in   the range of 0.045 - 0.120 ug/L may be associated with risks of adverse   clinical events.      TROPI 0.022 12/07/2015                                                                                                               Anton Calvillo MD

## 2020-05-19 NOTE — H&P
Medical ICU History and Physical    Name: Efrem Ramos MRN: [id number]     Age: 74 year old   YOB: 1945     Date of Admisson: 5/18/2020    Primary Care Provider: Danny Paige          HPI:   CC: Persistent bleeding and shock      Efrem Ramos is a 74 year old male with HO MEL, recently active shingles, PVD, Mitral valve disorder NOS, HLD, CVA 2015, A fib, severe aortic stenosis, mild to moderate mitral stenosis admitted 5/18/2020 for elective RCA catheterization being seen for persistent bleeding following cardiac catheterization.     Patient unerwent scheduled RCA catheterization with stent placement in anticipation of TAVR.  He initially appeared to recover well from the procedure, but developed persistent cath site bleeding and massive hematoma despite fem stop in place.   He apparently was hypotensive following the procedure and massive transfusion protocol was initiated based on hemodynamics however his hgb was not documented below 10.6.    He underwent CT with contrast which identified active extravasation in the common iliac vs proximal common femoral. The femstop was repositioned initially based on imaging. The vascular surgery consultant arrived at bedside and the femstop was again repositioned based on ultrasound assessment of artery position and distal flow.               Past Medical History:     Past Medical History:   Diagnosis Date     Atrial fibrillation (H)      Benign essential hypertension 11/20/2018     Cancer (H) vocal cord     Carpal tunnel syndrome     abstracted 7/3/02.     CVA (cerebral infarction) 5/5/2015     Demyelinating disease of central nervous system, unspecified (H)     abstracted 7/3/02.     Dyspnea      ENCEPHALOPATHY UNSPECIFIED  3/15/2005    acute diseminated encephalitis     Mitral valve problem 8/18/2013    TRANSTHORACIC ECHOCARDIOGRAM 08/2013 There is a linear strand like projection seen in the LV cavity in diastole that I suspect is  the posterior mitral leaflet although I cannot exclude a torn chordae or small vegetation       Mixed hyperlipidemia 3/15/2005     Other and unspecified noninfectious gastroenteritis and colitis(558.9)     abstracted 7/3/02.     Pneumonia 8/17/2016     PVD (peripheral vascular disease) (H)      Redundant colon     needs CT colonography     S/P coronary angiogram 09/05/2017     Shingles      SKIN DISORDERS NEC 3/15/2005     Sleep apnea              Past Surgical History:      Past Surgical History:   Procedure Laterality Date     BIOPSY  brain 2002     BONE MARROW BIOPSY, BONE SPECIMEN, NEEDLE/TROCAR N/A 6/8/2017    Procedure: BIOPSY BONE MARROW;  UNILATERAL BONE MARROW BIOPSY (CONSCIOUS SEDATION) ;  Surgeon: Jmaie Gonzales MD;  Location:  GI     C NONSPECIFIC PROCEDURE  as a child    T & A. abstracted 7/3/02.     C NONSPECIFIC PROCEDURE  early    CTR. abstracted 7/3/02.     CARDIAC CATHERIZATION  09/05/2017    2V CAD, IFR of RCA 0.95     CV CORONARY ANGIOGRAM N/A 3/23/2020    Procedure: Coronary Angiogram and right heart cath;  Surgeon: Gino Ferrer MD;  Location:  HEART CARDIAC CATH LAB     CV INSTANTANEOUS WAVE-FREE RATIO N/A 3/23/2020    Procedure: Instantaneous Wave-Free Ratio;  Surgeon: Gino Ferrer MD;  Location:  HEART CARDIAC CATH LAB     ESOPHAGOSCOPY, GASTROSCOPY, DUODENOSCOPY (EGD), COMBINED N/A 9/8/2018    Procedure: COMBINED ESOPHAGOSCOPY, GASTROSCOPY, DUODENOSCOPY (EGD), BIOPSY SINGLE OR MULTIPLE;;  Surgeon: Xander Marie MD;  Location:  GI     HEAD & NECK SURGERY       ORTHOPEDIC SURGERY      right arm ulna reset after injury     THORACOSCOPIC WEDGE RESECTION LUNG Right 8/2/2016    Procedure: THORACOSCOPIC WEDGE RESECTION LUNG;  Surgeon: Abdelrahman Noriega MD;  Location:  OR             Social History:     Social History     Socioeconomic History     Marital status:      Spouse name: Not on file     Number of children: Not on file     Years of  education: Not on file     Highest education level: Not on file   Occupational History     Not on file   Social Needs     Financial resource strain: Not on file     Food insecurity     Worry: Not on file     Inability: Not on file     Transportation needs     Medical: Not on file     Non-medical: Not on file   Tobacco Use     Smoking status: Former Smoker     Packs/day: 1.00     Years: 30.00     Pack years: 30.00     Types: Cigarettes     Last attempt to quit: 2013     Years since quittin.8     Smokeless tobacco: Never Used   Substance and Sexual Activity     Alcohol use: Yes     Alcohol/week: 42.0 standard drinks     Types: 42 Standard drinks or equivalent per week     Comment: occ     Drug use: No     Sexual activity: Not Currently   Lifestyle     Physical activity     Days per week: Not on file     Minutes per session: Not on file     Stress: Not on file   Relationships     Social connections     Talks on phone: Not on file     Gets together: Not on file     Attends Denominational service: Not on file     Active member of club or organization: Not on file     Attends meetings of clubs or organizations: Not on file     Relationship status: Not on file     Intimate partner violence     Fear of current or ex partner: Not on file     Emotionally abused: Not on file     Physically abused: Not on file     Forced sexual activity: Not on file   Other Topics Concern     Parent/sibling w/ CABG, MI or angioplasty before 65F 55M? Not Asked      Service Not Asked     Blood Transfusions Not Asked     Caffeine Concern Yes     Comment: 1 Coke occasionally      Occupational Exposure Not Asked     Hobby Hazards Not Asked     Sleep Concern Not Asked     Stress Concern Not Asked     Weight Concern Not Asked     Special Diet No     Back Care Not Asked     Exercise No     Bike Helmet Not Asked     Seat Belt Not Asked     Self-Exams Not Asked   Social History Narrative    Retired (disability)                Family History:     Family History   Problem Relation Age of Onset     Blood Disease Mother         Anemia     Cardiovascular Father      Cancer - colorectal Maternal Grandfather      Hypertension Brother      Diabetes Sister 5        Juvinile Diabetes passed at 36     Respiratory Other         Lung Cancer             Immunizations:     Immunization History   Administered Date(s) Administered     Influenza (High Dose) 3 valent vaccine 11/25/2013, 11/24/2014, 10/21/2015, 11/01/2016, 10/09/2017, 10/01/2018, 11/21/2019     Influenza (IIV3) PF 12/27/2000     Pneumo Conj 13-V (2010&after) 06/29/2015     Pneumococcal 23 valent 11/25/2013     TDAP Vaccine (Adacel) 12/06/2013             Allergies:     Allergies   Allergen Reactions     Sulfa Drugs Difficulty breathing     Adhesive Tape Blisters     Amiodarone Other (See Comments)     Developed pleural effusion     Penicillins Unknown     Reaction occurred as a child             Medications:   No current facility-administered medications on file prior to encounter.   acetaminophen (TYLENOL) 325 MG tablet, Take 325-650 mg by mouth every 6 hours as needed for mild pain  apixaban ANTICOAGULANT (ELIQUIS) 5 MG tablet, Take 1 tablet (5 mg) by mouth 2 times daily  ASPIRIN PO, Take 81 mg by mouth every evening   atorvastatin (LIPITOR) 40 MG tablet, Take 1 tablet (40 mg) by mouth daily  calcium carbonate 600 mg-vitamin D 400 units (CALTRATE) 600-400 MG-UNIT per tablet, Take 1 tablet by mouth 2 times daily  furosemide (LASIX) 20 MG tablet, Take 1 tablet (20 mg) by mouth daily  gabapentin (NEURONTIN) 300 MG capsule, Take 2 capsules (600 mg) by mouth every evening  melatonin 1 MG TABS tablet, Take 1-3 mg by mouth nightly as needed for sleep  multivitamin (OCUVITE) TABS, Take 1 tablet by mouth 2 times daily   potassium chloride ER 20 MEQ PO CR tablet, Take 1 tablet (20 mEq) by mouth daily  Cyanocobalamin 2000 MCG TABS, Take 2,000 mcg by mouth every 7 days On Sundays  loperamide  "(IMODIUM) 2 MG capsule, Take 2 mg by mouth 4 times daily as needed for diarrhea  polyethylene glycol (MIRALAX/GLYCOLAX) powder, Take 1 capful by mouth daily as needed for constipation  valACYclovir (VALTREX) 1000 mg tablet, TAKE 1 TABLET(1000 MG) BY MOUTH THREE TIMES DAILY FOR 7 DAYS             Review of Systems:     Review of systems is not obtainable due to patient factors - confusion, critical condition and sedation    Review of Systems   Unable to perform ROS: Acuity of condition            Physical Exam:     BP (!) 83/60   Pulse 86   Temp 97.9  F (36.6  C) (Oral)   Resp 16   Ht 1.702 m (5' 7\")   Wt 74.3 kg (163 lb 12.8 oz)   SpO2 95%   BMI 25.65 kg/m      Resp: 16      Physical Exam  Vitals signs and nursing note reviewed.   Constitutional:       General: He is in acute distress.      Comments: Somnolent, complaining of pain   HENT:      Head: Normocephalic and atraumatic.      Right Ear: External ear normal.      Left Ear: External ear normal.      Nose: Nose normal.      Mouth/Throat:      Mouth: Mucous membranes are moist.      Pharynx: Oropharynx is clear.   Eyes:      Extraocular Movements: Extraocular movements intact.      Conjunctiva/sclera: Conjunctivae normal.      Pupils: Pupils are equal, round, and reactive to light.   Neck:      Musculoskeletal: Normal range of motion and neck supple. No neck rigidity.   Cardiovascular:      Rate and Rhythm: Tachycardia present. Rhythm irregular.      Heart sounds: Murmur present.   Pulmonary:      Effort: Pulmonary effort is normal.      Breath sounds: Normal breath sounds.   Abdominal:      Palpations: Abdomen is soft.      Tenderness: There is abdominal tenderness.      Comments: Tenderness in lower abd   Genitourinary:     Comments: Tight edema and bruising of penis and scrotum  Musculoskeletal:      Comments: Bruising at L iliac crest   Skin:     General: Skin is warm and dry.   Neurological:      General: No focal deficit present.      Mental Status: " He is oriented to person, place, and time.                  Data:   ROUTINE ICU LABS (Last four results)  CMP  Recent Labs   Lab 05/18/20  0831      POTASSIUM 3.9   CHLORIDE 109   CO2 24   ANIONGAP 7   GLC 84   BUN 8   CR 0.84   GFRESTIMATED 84   GFRESTBLACK >90   ULISSES 8.5     CBC  Recent Labs   Lab 05/18/20  1935 05/18/20  0831   WBC 23.8* 11.9*   RBC 3.21* 3.77*   HGB 10.6* 12.4*   HCT 32.7* 38.2*   * 101*   MCH 33.0 32.9   MCHC 32.4 32.5   RDW 15.2* 14.9    310     INR  Recent Labs   Lab 05/18/20  0831   INR 1.18*     Arterial Blood GasNo lab results found in last 7 days.         Imaging:   EKG:    CTA ABD and Pelvis with contrast 5/18/2020  FINDINGS: There is active extravasation from probably the distal  external iliac artery vs. very proximal common femoral artery into the  superficial tissues of the left groin, as well as into the inguinal  canal which extends into the scrotum. The scrotum is markedly  distended through the hematoma. No significant intra-abdominal  extension. There are extensive atherosclerotic changes of the  visualized aorta and its branches. There is no evidence of aortic  dissection or aneurysm. There are no dilated loops of small intestine  or large bowel to suggest ileus or obstruction. The liver, spleen,  adrenal glands, kidneys, and pancreas demonstrate no worrisome focal  lesion.                                                                       IMPRESSION: Active extravasation from the left common femoral artery  into the left groin and extensive hemorrhage into the scrotum. The  center of the compression device is approximately 4 cm caudal and 3 cm  lateral to the area of active extravasation.             Assessment and Plan:       Neuro/psych:    -Sedation: None   -Pain: Fentanyl PRN for pain   -Agitation: None    Pulmonary:     ## Fibrosis vs CPFE on imaging. Thought secondary to amiodarone per cardiology notes but no pulmonology notes available.  - Titrate  O2 to maintain sats above 92%    Cardiac:     ## Single vessel disease SP 4 stents to the RCA this admission with persistent cath site bleeding  Plavix in the cath lab, no further antiplatelet or anticoagulating drugs at this time due to ongoing bleeding. Await recs from cardiology in the AM after reassessment.   Persistent cath site bleeding with hemodynamic compromise, lowest measured HGB 10.6. Massive transfusion protocol with 3u PRC's given.  Activated clot time 171 early evening. INR 1.5    ## Valvular heart disease  - Mild to moderate mitral disease  - Severe Aortic stenosis pending TAVR  Monitor    ## Atrial fib  High Chads-Vasc on apixaban and metoprolol as outpatient   Holding apixaban due to bleeding.  Holding metoprolol due to hypotension    ## Hypotension/shock  Primarily hemorrhagic secondary to persistent cath site bleeding requiring epi up to 0.4 mcg/kg/min. Initially treated with fluids and blood. BP appears to be rapidly improving and pressor needs declining so central line / arterial line deferred for now. Lactic acid initially 5.4, will trend.       Renal / FEN:   ## Scrotal / penile edema  Discussed with urology on-call. No concern for vascular compromise in this situation. They will reassess in the AM.  He initially had trouble voiding but after manual compression in an attempt to place a pedroza, he was able to void to pedroza was not placed.       Infectious Disease: No active concerns    GI/:   -Nutrition: NPO with meds overnight.   Will reassess in AM, to ensure no procedures which would preclude PO intake.    Endocrine: No active concerns    Heme:   ## Blood loss with hemodynamic instability though anemia was not measured  - HGB goal > 8 per cardiology.   - Trending HGB Q4h    Skin:   ## Puncture site and bruising at L iliac crest    Prophylaxis:    -GI: None   -DVT: No chemoprophylaxis due to bleeding, will reassess in AM    Family Disposition: Wife updated by phone     CODE: Full    Dispo:  Likely out of the ICU tomorrow if remains stable          Karson Howard M.D.  Pulmonary & Critical Care  Pager: Click Here to page      Efrem Ramos remains critically ill with hypovolemic shock.  I personally examined and evaluated the patient today.   I have evaluated all laboratory values and imaging studies from the past 24 hours.    I spent a total of greater than 60 minutes (excluding procedure time) personally providing and directing critical care services at the  bedside and on the critical care unit for Efrem Ramos.

## 2020-05-19 NOTE — CODE/RAPID RESPONSE
[facility]  [facility] RRT Note  5/18/2020   Time Called: 1834  RRT called for: hypotension  Code Status: Full Code During Procedure    Assessment & Plan   I was paged to the bedside to evaluate . Efrem Ramos for an acute episode of hypotension following stent placement by Dr. Swenson. Nursing staff paged an RRT after determining patient had a large left leg hematoma. Patient was hemodynamically unstable on my initial exam. BPs were  60/30 mmHg. There was a large hematoma extending into the patient's scrotum. Patient was pale, lightheaded and lethargic.     I initiated the massive transfusion protocol given this patients initial appearance and apparent bleed. Patient was given multiple 10 mcg pushes of epinephrine to maintain adequate perfusion. Dr. Calvillo with cardiology was stat paged and came to the bedside to evaluate groin. While Dr. Calvillo was on his way to [facility], I stat consulted vascular surgery to evaluate groin also. I ordered a CT/CTA of his abdomen and pelvis. Patient was stabilized with multiple units of blood, calcium chloride, epinephrine and norepinephrine infusion. Patient was taken to CT without issue then taken to the ICU. Dr. Howard was given full hand-off of what happened in care suites post procedure. Dr. Worthy with vasuclar surgery came to the ICU to evaluate patient and reposition femstop pressure device.     Diagnosis:  -- hypovolemic/hemorrhagic shock 2/2 left femoral access site complication    Interventions ordered/provided:  -- epinephrine pushes  -- MTP  -- norepinephrine infusion  -- fluid bolus x1  -- CT as noted above  -- admission to ICU  -- labs    At the conclusion of this RRT patient was hemodynamically unstable and will transfer to ICU. Dr. Howard with critical-care medicine was updated and will assume care.    Wife, Mariajose was notified of change in condition and transfer to the ICU.    Interval History     Mr. Efrem Ramos is a 74 year old male  who was admitted on 5/18/2020 for hemorrhagic shock.    His history is significant for:  Past Medical History:   Diagnosis Date     Atrial fibrillation (H)      Benign essential hypertension 11/20/2018     Cancer (H) vocal cord     Carpal tunnel syndrome     abstracted 7/3/02.     CVA (cerebral infarction) 5/5/2015     Demyelinating disease of central nervous system, unspecified (H)     abstracted 7/3/02.     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     Pneumonia 8/17/2016     PVD (peripheral vascular disease) (H)      Redundant colon     needs CT colonography     S/P coronary angiogram 09/05/2017     Shingles      SKIN DISORDERS NEC 3/15/2005     Sleep apnea      Past Surgical History:   Procedure Laterality Date     BIOPSY  brain 2002     BONE MARROW BIOPSY, BONE SPECIMEN, NEEDLE/TROCAR N/A 6/8/2017    Procedure: BIOPSY BONE MARROW;  UNILATERAL BONE MARROW BIOPSY (CONSCIOUS SEDATION) ;  Surgeon: Jamie Gonzales MD;  Location:  GI     C NONSPECIFIC PROCEDURE  as a child    T & A. abstracted 7/3/02.     C NONSPECIFIC PROCEDURE  early    CTR. abstracted 7/3/02.     CARDIAC CATHERIZATION  09/05/2017    2V CAD, IFR of RCA 0.95     CV CORONARY ANGIOGRAM N/A 3/23/2020    Procedure: Coronary Angiogram and right heart cath;  Surgeon: Gino Ferrer MD;  Location: [facility] CARDIAC CATH LAB     CV INSTANTANEOUS WAVE-FREE RATIO N/A 3/23/2020    Procedure: Instantaneous Wave-Free Ratio;  Surgeon: Gino Ferrer MD;  Location: [facility] CARDIAC CATH LAB     ESOPHAGOSCOPY,  GASTROSCOPY, DUODENOSCOPY (EGD), COMBINED N/A 9/8/2018    Procedure: COMBINED ESOPHAGOSCOPY, GASTROSCOPY, DUODENOSCOPY (EGD), BIOPSY SINGLE OR MULTIPLE;;  Surgeon: Xander Marie MD;  Location:  GI     HEAD & NECK SURGERY       ORTHOPEDIC SURGERY      right arm ulna reset after injury     THORACOSCOPIC WEDGE RESECTION LUNG Right 8/2/2016    Procedure: THORACOSCOPIC WEDGE RESECTION LUNG;  Surgeon: Abdelrahman Noriega MD;  Location:  OR       Allergies   Allergies   Allergen Reactions     Sulfa Drugs Difficulty breathing     Adhesive Tape Blisters     Amiodarone Other (See Comments)     Developed pleural effusion     Penicillins Unknown     Reaction occurred as a child       Physical Exam   Physical Exam  Constitutional:       General: He is in acute distress.      Appearance: He is ill-appearing, toxic-appearing and diaphoretic.   HENT:      Head: Normocephalic and atraumatic.      Mouth/Throat:      Mouth: Mucous membranes are dry.   Eyes:      Extraocular Movements: Extraocular movements intact.      Pupils: Pupils are equal, round, and reactive to light.   Cardiovascular:      Rate and Rhythm: Tachycardia present. Rhythm irregular.      Pulses: Decreased pulses.           Dorsalis pedis pulses are 0 on the right side and 0 on the left side.      Heart sounds: No systolic murmur.   Pulmonary:      Effort: Pulmonary effort is normal.   Abdominal:      General: Abdomen is flat. There is no distension.   Genitourinary:     Penis: Swelling present.    Musculoskeletal: Normal range of motion.      Right lower leg: No edema.      Left lower leg: No edema.   Skin:     General: Skin is warm.      Capillary Refill: Capillary refill takes 2 to 3 seconds.   Neurological:      Mental Status: He is oriented to person, place, and time.   Psychiatric:         Mood and Affect: Mood normal.         Vital Signs with Ranges:  Temp:  [97.9  F (36.6  C)] 97.9  F (36.6  C)  Pulse:  [] 107  Heart Rate:  []  97  Resp:  [8-39] 28  BP: ()/(36-92) 114/76  SpO2:  [30 %-100 %] 96 %  No intake/output data recorded.    Data     Results for orders placed or performed during the hospital encounter of 05/18/20 (from the past 24 hour(s))   EKG 12-lead, tracing only   Result Value Ref Range    Interpretation ECG Click View Image link to view waveform and result    Basic metabolic panel   Result Value Ref Range    Sodium 140 133 - 144 mmol/L    Potassium 3.9 3.4 - 5.3 mmol/L    Chloride 109 94 - 109 mmol/L    Carbon Dioxide 24 20 - 32 mmol/L    Anion Gap 7 3 - 14 mmol/L    Glucose 84 70 - 99 mg/dL    Urea Nitrogen 8 7 - 30 mg/dL    Creatinine 0.84 0.66 - 1.25 mg/dL    GFR Estimate 84 >60 mL/min/[1.73_m2]    GFR Estimate If Black >90 >60 mL/min/[1.73_m2]    Calcium 8.5 8.5 - 10.1 mg/dL   CBC with platelets   Result Value Ref Range    WBC 11.9 (H) 4.0 - 11.0 10e9/L    RBC Count 3.77 (L) 4.4 - 5.9 10e12/L    Hemoglobin 12.4 (L) 13.3 - 17.7 g/dL    Hematocrit 38.2 (L) 40.0 - 53.0 %     (H) 78 - 100 fl    MCH 32.9 26.5 - 33.0 pg    MCHC 32.5 31.5 - 36.5 g/dL    RDW 14.9 10.0 - 15.0 %    Platelet Count 310 150 - 450 10e9/L   INR   Result Value Ref Range    INR 1.18 (H) 0.86 - 1.14   Partial thromboplastin time   Result Value Ref Range    PTT 33 22 - 37 sec   ABO/Rh type and screen   Result Value Ref Range    Units Ordered 8     ABO O     RH(D) Pos     Antibody Screen Neg     Test Valid Only At [facility]        Specimen Expires 05/21/2020     Crossmatch Red Blood Cells    Blood component   Result Value Ref Range    Unit Number [id number]     Blood Component Type Red Blood Cells Leukocyte Reduced     Division Number 00     Status of Unit Released to care unit    Blood component   Result Value Ref Range    Unit Number [id number]     Blood Component Type Red Blood Cells Leukocyte Reduced     Division Number 00     Status of Unit Released to care unit    Blood component   Result Value Ref Range    Unit  Number [id number]     Blood Component Type Red Blood Cells Leukocyte Reduced     Division Number 00     Status of Unit No longer available 05/18/2020 2009    Blood component   Result Value Ref Range    Unit Number [id number]     Blood Component Type Red Blood Cells Leukocyte Reduced     Division Number 00     Status of Unit Released to care unit    Blood component   Result Value Ref Range    Unit Number [id number]     Blood Component Type Red Blood Cells Leukocyte Reduced     Division Number 00     Status of Unit No longer available 05/18/2020 2251     Blood Product Code Y6921G08     Unit Status RET    Blood component   Result Value Ref Range    Unit Number [id number]     Blood Component Type Red Blood Cells Leukocyte Reduced     Division Number 00     Status of Unit Ready for patient 05/18/2020 2254     Blood Product Code J5721L12     Unit Status DEAN    Blood component   Result Value Ref Range    Unit Number [id number]     Blood Component Type Red Blood Cells Leukocyte Reduced     Division Number 00     Status of Unit No longer available 05/18/2020 2253     Blood Product Code E2580I73     Unit Status RET    Blood component   Result Value Ref Range    Unit Number [id number]     Blood Component Type Red Blood Cells Leukocyte Reduced     Division Number 00     Status of Unit No longer available 05/18/2020 2252     Blood Product Code X4492L99     Unit Status RET    Activated clotting time POCT   Result Value Ref Range    Activated Clot Time 211 (H) 75 - 150 sec   Activated clotting time POCT   Result Value Ref Range    Activated Clot Time 239 (H) 75 - 150 sec   Activated clotting time POCT   Result Value Ref Range    Activated Clot Time 267 (H) 75 - 150 sec   Cardiac Catheterization    Narrative    1.  Successful complex PCI with adjunctive rotational atherectomy with   drug-eluting stents x4 from proximal to distal RCA  2.  Temporary pacemaker wire removed at end of case   EKG 12-lead, tracing only     Result Value Ref Range    Interpretation ECG Click View Image link to view waveform and result    Activated clotting time POCT   Result Value Ref Range    Activated Clot Time 219 (H) 75 - 150 sec   Activated clotting time POCT   Result Value Ref Range    Activated Clot Time 199 (H) 75 - 150 sec   Lactic acid level STAT   Result Value Ref Range    Lactate for Sepsis Protocol 0.7 0.7 - 2.0 mmol/L   Activated clotting time POCT   Result Value Ref Range    Activated Clot Time 308 (H) 75 - 150 sec   Activated clotting time POCT   Result Value Ref Range    Activated Clot Time 171 (H) 75 - 150 sec   Plasma prepare order unit   Result Value Ref Range    Blood Component Type Plasma     Units Ordered 4    Blood component   Result Value Ref Range    Unit Number [id number]     Blood Component Type Plasma, Thawed     Division Number 00     Status of Unit No longer available 05/18/2020 2250     Blood Product Code M6905G68     Unit Status RET    Blood component   Result Value Ref Range    Unit Number [id number]     Blood Component Type Plasma, Thawed     Division Number 00     Status of Unit No longer available 05/18/2020 2249     Blood Product Code R4651V58     Unit Status RET    Blood component   Result Value Ref Range    Unit Number [id number]     Blood Component Type Plasma, Thawed     Division Number 00     Status of Unit No longer available 05/18/2020 2301     Blood Product Code J1954Q25     Unit Status RET    Blood component   Result Value Ref Range    Unit Number [id number]     Blood Component Type Plasma, Thawed     Division Number 00     Status of Unit No longer available 05/18/2020 2301     Blood Product Code K5202N75     Unit Status RET    Platelets prepare order unit   Result Value Ref Range    Blood Component Type PLT Pheresis     Units Ordered 1    Cryoprecipitate prepare order unit   Result Value Ref Range    Blood Component Type Cryoprecipitate     Units Ordered 1    Blood component   Result Value  Ref Range    Unit Number [id number]     Blood Component Type PlateletPheresis LeukoReduced Irradiated     Division Number 00     Status of Unit No longer available 05/18/2020 2247     Blood Product Code M1413G66     Unit Status RET    CBC with platelets   Result Value Ref Range    WBC 23.8 (H) 4.0 - 11.0 10e9/L    RBC Count 3.21 (L) 4.4 - 5.9 10e12/L    Hemoglobin 10.6 (L) 13.3 - 17.7 g/dL    Hematocrit 32.7 (L) 40.0 - 53.0 %     (H) 78 - 100 fl    MCH 33.0 26.5 - 33.0 pg    MCHC 32.4 31.5 - 36.5 g/dL    RDW 15.2 (H) 10.0 - 15.0 %    Platelet Count 261 150 - 450 10e9/L   CTA Abdomen Pelvis with Contrast   Result Value Ref Range    Radiologist flags Active extravasation (AA)     Narrative    CTA ABDOMEN AND PELVIS WITH CONTRAST   5/18/2020 8:12 PM     HISTORY: Rupture of artery. Post angiography with femoral approach,  now large hematoma formation.    TECHNIQUE:  82mL Isovue-370. CTA images were obtained before and after  contrast administration.Radiation dose for this scan was reduced using  automated exposure control, adjustment of the mA and/or kV according  to patient size, or iterative reconstruction technique. Multi planar  And Three-D reformatted images were created on a separate workstation.    COMPARISON: April 2, 2020    FINDINGS: There is active extravasation from probably the distal  external iliac artery vs. very proximal common femoral artery into the  superficial tissues of the left groin, as well as into the inguinal  canal which extends into the scrotum. The scrotum is markedly  distended through the hematoma. No significant intra-abdominal  extension. There are extensive atherosclerotic changes of the  visualized aorta and its branches. There is no evidence of aortic  dissection or aneurysm. There are no dilated loops of small intestine  or large bowel to suggest ileus or obstruction. The liver, spleen,  adrenal glands, kidneys, and pancreas demonstrate no worrisome focal  lesion.        Impression    IMPRESSION: Active extravasation from the left common femoral artery  into the left groin and extensive hemorrhage into the scrotum. The  center of the compression device is approximately 4 cm caudal and 3 cm  lateral to the area of active extravasation.    [Critical Result: Active extravasation]    Finding was identified on 5/18/2020 8:14 PM.     Dr. Diez was contacted by me at 5/18/2020 8:32 PM and  verbalized understanding of the critical finding.     MARY WESLEY MD   Glucose by meter   Result Value Ref Range    Glucose 101 (H) 70 - 99 mg/dL   Basic metabolic panel   Result Value Ref Range    Sodium 141 133 - 144 mmol/L    Potassium 4.3 3.4 - 5.3 mmol/L    Chloride 111 (H) 94 - 109 mmol/L    Carbon Dioxide 20 20 - 32 mmol/L    Anion Gap 10 3 - 14 mmol/L    Glucose 155 (H) 70 - 99 mg/dL    Urea Nitrogen 8 7 - 30 mg/dL    Creatinine 0.86 0.66 - 1.25 mg/dL    GFR Estimate 84 >60 mL/min/[1.73_m2]    GFR Estimate If Black >90 >60 mL/min/[1.73_m2]    Calcium 8.8 8.5 - 10.1 mg/dL   Calcium ionized whole blood (Add on or recollect)   Result Value Ref Range    Calcium Ionized Whole Blood 4.7 4.4 - 5.2 mg/dL   CBC with platelets   Result Value Ref Range    WBC 21.4 (H) 4.0 - 11.0 10e9/L    RBC Count 3.80 (L) 4.4 - 5.9 10e12/L    Hemoglobin 12.1 (L) 13.3 - 17.7 g/dL    Hematocrit 36.7 (L) 40.0 - 53.0 %    MCV 97 78 - 100 fl    MCH 31.8 26.5 - 33.0 pg    MCHC 33.0 31.5 - 36.5 g/dL    RDW 16.8 (H) 10.0 - 15.0 %    Platelet Count 238 150 - 450 10e9/L   INR   Result Value Ref Range    INR 1.51 (H) 0.86 - 1.14   Lactic acid whole blood   Result Value Ref Range    Lactic Acid 5.4 (HH) 0.7 - 2.0 mmol/L   Magnesium   Result Value Ref Range    Magnesium 1.8 1.6 - 2.3 mg/dL   US Low Extremity Arterial Duplex Left Port    Narrative    EXAM: US LOWER EXTREMITY ARTERIAL DUPLEX LEFT PORTABLE  LOCATION: [facility]  DATE/TIME: 5/18/2020 10:04 PM    INDICATION: Status post angiogram. Pain. Rule out  bleeding.  COMPARISON: None.  TECHNIQUE: Duplex utilizing 2D gray-scale imaging, Doppler interrogation with color-flow and spectral waveform analysis.    1.  Findings: Ultrasound examination of the left groin shows no evidence for pseudoaneurysm or active bleeding. Ill-defined hypoechoic region in the left groin measuring 7.5 x 4.6 cm probably ill-defined hematoma. The underlying common femoral artery is   patent.       EKG:  -- none    Time Spent on this Encounter   I spent 90 minutes of critical care time on the unit/floor managing the care of Efrem Ramos. Upon evaluation, this patient had a high probability of imminent or life-threatening deterioration due to hypovolemic shock, which required my direct attention, intervention, and personal management. 100% of my time was spent at the bedside counseling the patient and/or coordinating care regarding services listed in this note.    FABI Black, CNP  Hospitalist - House [facility]  Text Page  [phone])

## 2020-05-20 ENCOUNTER — APPOINTMENT (OUTPATIENT)
Dept: OCCUPATIONAL THERAPY | Facility: CLINIC | Age: 75
DRG: 246 | End: 2020-05-20
Attending: NURSE PRACTITIONER
Payer: MEDICARE

## 2020-05-20 LAB
ANION GAP SERPL CALCULATED.3IONS-SCNC: 7 MMOL/L (ref 3–14)
BLD PROD TYP BPU: NORMAL
BUN SERPL-MCNC: 12 MG/DL (ref 7–30)
CALCIUM SERPL-MCNC: 7.8 MG/DL (ref 8.5–10.1)
CHLORIDE SERPL-SCNC: 111 MMOL/L (ref 94–109)
CO2 SERPL-SCNC: 22 MMOL/L (ref 20–32)
CREAT SERPL-MCNC: 0.79 MG/DL (ref 0.66–1.25)
ERYTHROCYTE [DISTWIDTH] IN BLOOD BY AUTOMATED COUNT: 17.1 % (ref 10–15)
GFR SERPL CREATININE-BSD FRML MDRD: 87 ML/MIN/{1.73_M2}
GLUCOSE SERPL-MCNC: 93 MG/DL (ref 70–99)
HCT VFR BLD AUTO: 24.5 % (ref 40–53)
HGB BLD-MCNC: 8.5 G/DL (ref 13.3–17.7)
MCH RBC QN AUTO: 32.6 PG (ref 26.5–33)
MCHC RBC AUTO-ENTMCNC: 34.7 G/DL (ref 31.5–36.5)
MCV RBC AUTO: 94 FL (ref 78–100)
NUM BPU REQUESTED: 0
PLATELET # BLD AUTO: 209 10E9/L (ref 150–450)
POTASSIUM SERPL-SCNC: 3.6 MMOL/L (ref 3.4–5.3)
RBC # BLD AUTO: 2.61 10E12/L (ref 4.4–5.9)
SODIUM SERPL-SCNC: 140 MMOL/L (ref 133–144)
WBC # BLD AUTO: 17.9 10E9/L (ref 4–11)

## 2020-05-20 PROCEDURE — 25000128 H RX IP 250 OP 636: Performed by: NURSE PRACTITIONER

## 2020-05-20 PROCEDURE — 25000132 ZZH RX MED GY IP 250 OP 250 PS 637: Mod: GY | Performed by: INTERNAL MEDICINE

## 2020-05-20 PROCEDURE — 99232 SBSQ HOSP IP/OBS MODERATE 35: CPT | Performed by: INTERNAL MEDICINE

## 2020-05-20 PROCEDURE — 80048 BASIC METABOLIC PNL TOTAL CA: CPT | Performed by: INTERNAL MEDICINE

## 2020-05-20 PROCEDURE — 99291 CRITICAL CARE FIRST HOUR: CPT | Performed by: INTERNAL MEDICINE

## 2020-05-20 PROCEDURE — 97535 SELF CARE MNGMENT TRAINING: CPT | Mod: GO

## 2020-05-20 PROCEDURE — 85027 COMPLETE CBC AUTOMATED: CPT | Performed by: INTERNAL MEDICINE

## 2020-05-20 PROCEDURE — 20000003 ZZH R&B ICU

## 2020-05-20 PROCEDURE — 25800030 ZZH RX IP 258 OP 636: Performed by: INTERNAL MEDICINE

## 2020-05-20 PROCEDURE — 97165 OT EVAL LOW COMPLEX 30 MIN: CPT | Mod: GO

## 2020-05-20 RX ORDER — MAGNESIUM SULFATE HEPTAHYDRATE 40 MG/ML
2 INJECTION, SOLUTION INTRAVENOUS DAILY PRN
Status: DISCONTINUED | OUTPATIENT
Start: 2020-05-20 | End: 2020-06-04 | Stop reason: HOSPADM

## 2020-05-20 RX ORDER — POTASSIUM CHLORIDE 1.5 G/1.58G
20-40 POWDER, FOR SOLUTION ORAL
Status: DISCONTINUED | OUTPATIENT
Start: 2020-05-20 | End: 2020-06-04 | Stop reason: HOSPADM

## 2020-05-20 RX ORDER — MAGNESIUM SULFATE HEPTAHYDRATE 40 MG/ML
4 INJECTION, SOLUTION INTRAVENOUS EVERY 4 HOURS PRN
Status: DISCONTINUED | OUTPATIENT
Start: 2020-05-20 | End: 2020-06-04 | Stop reason: HOSPADM

## 2020-05-20 RX ORDER — POTASSIUM CHLORIDE 29.8 MG/ML
20 INJECTION INTRAVENOUS
Status: DISCONTINUED | OUTPATIENT
Start: 2020-05-20 | End: 2020-06-04 | Stop reason: HOSPADM

## 2020-05-20 RX ORDER — SODIUM CHLORIDE, SODIUM LACTATE, POTASSIUM CHLORIDE, CALCIUM CHLORIDE 600; 310; 30; 20 MG/100ML; MG/100ML; MG/100ML; MG/100ML
INJECTION, SOLUTION INTRAVENOUS CONTINUOUS
Status: DISCONTINUED | OUTPATIENT
Start: 2020-05-20 | End: 2020-05-25

## 2020-05-20 RX ORDER — POTASSIUM CHLORIDE 7.45 MG/ML
10 INJECTION INTRAVENOUS
Status: DISCONTINUED | OUTPATIENT
Start: 2020-05-20 | End: 2020-06-04 | Stop reason: HOSPADM

## 2020-05-20 RX ORDER — DIGOXIN 0.25 MG/ML
125 INJECTION INTRAMUSCULAR; INTRAVENOUS ONCE
Status: COMPLETED | OUTPATIENT
Start: 2020-05-20 | End: 2020-05-20

## 2020-05-20 RX ORDER — POTASSIUM CHLORIDE 1500 MG/1
20-40 TABLET, EXTENDED RELEASE ORAL
Status: DISCONTINUED | OUTPATIENT
Start: 2020-05-20 | End: 2020-06-04 | Stop reason: HOSPADM

## 2020-05-20 RX ORDER — POTASSIUM CL/LIDO/0.9 % NACL 10MEQ/0.1L
10 INTRAVENOUS SOLUTION, PIGGYBACK (ML) INTRAVENOUS
Status: DISCONTINUED | OUTPATIENT
Start: 2020-05-20 | End: 2020-06-04 | Stop reason: HOSPADM

## 2020-05-20 RX ADMIN — SODIUM CHLORIDE, POTASSIUM CHLORIDE, SODIUM LACTATE AND CALCIUM CHLORIDE: 600; 310; 30; 20 INJECTION, SOLUTION INTRAVENOUS at 04:14

## 2020-05-20 RX ADMIN — DIGOXIN 125 MCG: 0.25 INJECTION INTRAMUSCULAR; INTRAVENOUS at 11:50

## 2020-05-20 RX ADMIN — POTASSIUM CHLORIDE 20 MEQ: 29.8 INJECTION, SOLUTION INTRAVENOUS at 11:50

## 2020-05-20 RX ADMIN — SODIUM CHLORIDE, POTASSIUM CHLORIDE, SODIUM LACTATE AND CALCIUM CHLORIDE 500 ML: 600; 310; 30; 20 INJECTION, SOLUTION INTRAVENOUS at 14:06

## 2020-05-20 RX ADMIN — GABAPENTIN 600 MG: 300 CAPSULE ORAL at 21:30

## 2020-05-20 RX ADMIN — SODIUM CHLORIDE, POTASSIUM CHLORIDE, SODIUM LACTATE AND CALCIUM CHLORIDE: 600; 310; 30; 20 INJECTION, SOLUTION INTRAVENOUS at 05:34

## 2020-05-20 RX ADMIN — SODIUM CHLORIDE, POTASSIUM CHLORIDE, SODIUM LACTATE AND CALCIUM CHLORIDE: 600; 310; 30; 20 INJECTION, SOLUTION INTRAVENOUS at 17:16

## 2020-05-20 RX ADMIN — CLOPIDOGREL BISULFATE 75 MG: 75 TABLET, FILM COATED ORAL at 09:15

## 2020-05-20 RX ADMIN — ASPIRIN 81 MG: 81 TABLET, DELAYED RELEASE ORAL at 09:15

## 2020-05-20 RX ADMIN — ATORVASTATIN CALCIUM 40 MG: 40 TABLET, FILM COATED ORAL at 09:15

## 2020-05-20 ASSESSMENT — ACTIVITIES OF DAILY LIVING (ADL)
ADLS_ACUITY_SCORE: 21
PREVIOUS_RESPONSIBILITIES: MEAL PREP;MEDICATION MANAGEMENT
ADLS_ACUITY_SCORE: 21

## 2020-05-20 ASSESSMENT — MIFFLIN-ST. JEOR: SCORE: 1475.63

## 2020-05-20 NOTE — PROGRESS NOTES
Critical Care Progress Note  05/20/2020    Name: Efrem Ramos MRN#: [id number]   Age: 74 year old YOB: 1945     \[facility] [place] [facility]\"" Day# 2           Problem List:   Principal Problem:    Coronary artery disease involving native coronary artery of native heart without angina pectoris  Active Problems:    CAD (coronary artery disease)-moderate  nonobstructive 2 vessel    Status post coronary angiogram    Bleeding           Summary/Hospital Course:   74 year old male with history of MEL, recent shingles, PVD, HLD, CVA 2015, A fib, severe aortic stenosis, and mild to moderate mitral stenosis admitted 5/18/2020 for elective RCA catheterization with stent placement in anticipation of TAVR. Post procedure, he developed persistent cath site bleeding and massive hematoma. He was also hypotensive, and was transferred to the ICU. He was transfused 3 units PRBCs. CT contrast was done, which found active extravasation in the common iliac vs proximal common femoral. Femstop was repositioned by vascular surgery. He had persistent pressor needs after ICU admission, and central line was placed on 5/19 in the early morning.     Assessment and plan :     Efrem Ramos is a 74 year old male admitted on 5/18/2020 for RCA catheterization and stent placement in anticipation of TAVR, with course complicated by post op bleeding/hematoma and shock state requiring pressors, in the setting of a history of MEL, recent shingles, PVD, HLD, CVA 2015, A fib, severe aortic stenosis, and mild to moderate mitral stenosis.  I have personally reviewed the daily labs, imaging studies, cultures and discussed the case with referring physician and consulting physicians.   My assessment and plan by system for this patient is as follows:    Neurology/Psychiatry:   1. Analgesia  Plan  -acetaminophen 650 mg q4h PRN  -gabapentin 600 mg nightly     Cardiovascular:   1. Single vessel CAD (RCA) s/p 4 stents on 5/18, complicated by cath site  bleeding/hematoma  2. Severe Aortic stenosis  3. Mild-moderate mitral stenosis  4. Atrial fibrillation with RVR  5. Shock state, acute blood loss vs undifferentiated vs vasoplegia.   Plan  -appreciate cardiology assistance  -Continue norepinephrine, wean as able, MAP goal of 60 (baseline SBPs often in 90s per chart review and patient)  -Give 500 mL LR bolus now in attempt to wean pressor   -Follow serial hemoglobin q8h, may need blood if continues to decrease   -holding eliquis given bleeding.  -Holding home metoprolol given ongoing pressor requirement  -redose digoxin 125 mcg today.   -Unclear timing on plan for TAVR  -asa 81 mg daily  -clopidogrel 75 mg daily   -atorvastatin 40 mg dialy     Pulmonary/Ventilator Management:   1. Acute hypoxic respiratory insufficiency, resolved  2. 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 Emphysema on imaging: not on inhalers as outpatient  Plan  - wean supplemental O2 as able, now on room air.   -albuterol PRN    GI/Nutrition :   1.  Nutrition:  Plan  -cardiac diet  -docusate 100 mg BID    Renal/Fluids/Electrolytes/:  1. Penile and scrotal edema and hematoma: slow improvement   2. Hypotension, possible hypovolemia.   Plan  - Urology consulted, appreciate assistance  - monitor function and electrolytes as needed with replacement per ICU protocols.  - generally avoid nephrotoxic agents such as NSAID, IV contrast unless specifically required  - adjust medications as needed for renal clearance  - follow I/O's as appropriate.  - Bolus 500 mL LR as above. Also continue LR at 50 mL/hr, with plan to stop if PO intake improves    Infectious Disease:    1. No acute issues  Plan:  -monitor    Endocrine:   1. No acute issues  Plan  - ICU insulin protocol, goal sugar <180    Hematology/Oncology:   1.  Acute blood loss anemia with hemodynamic instability   Plan  - Pressors as above, wean as able  -Hgb goal >8 per cardiology, transfuse if needed.    -follow Hgb q8h  -Holding eliquis with bleeding  -Dual antiplatelet therapy with aspirin and plavix for recent heart stents (placed 5/18)    MSK/Rheum:  1. No acute issues  Plan:  -monitor    IV/Access:   1. Venous access - right internal jugular CVC   2. Arterial access - none  3.  Plan  - central access required and necessary    ICU Prophylaxis:   1. DVT: mechanical  2. VAP: Not indicated  3. Stress Ulcer: Not indicated  4. Restraints: Not indicated  5. Wound care - per unit routine   6. Feeding - advance to cardiac diet  7. Family Update: not yet done  8. Disposition - remain in ICU given pressor requirement    Key goals for next 24 hours:   1. Wean pressors as able  2.    Bolus LR  3.    Redose digoxin  4.    Follow Hgb, transfuse for <8        Interim History/Overnight Events:   Continued on levophed overnight. Slowly improving scrotal edema. Troponin trended down. Mr. Ramos reports ongoing soreness in his scrotum. He tried to eat last night, but feels his swallow is somewhat weak. He feels weak overall. No fevers, chills, chest pain, or shortness of breath. Still having some lower abdominal pain. Able to urinate. Does feel a bit dizzy and lightheaded when heart rate is elevated.        Key Medications:       aspirin  81 mg Oral Daily     atorvastatin  40 mg Oral Daily     clopidogrel  75 mg Oral Daily     docusate sodium  100 mg Oral BID     gabapentin  600 mg Oral QPM     sodium chloride (PF)  3 mL Intracatheter Q8H       - MEDICATION INSTRUCTIONS -       - MEDICATION INSTRUCTIONS -       - MEDICATION INSTRUCTIONS -       - MEDICATION INSTRUCTIONS -       lactated ringers 50 mL/hr at 05/20/20 0534     norepinephrine 0.1 mcg/kg/min (05/20/20 0800)     Percutaneous Coronary Intervention orders placed (this is information for BPA alerting)       Percutaneous Coronary Intervention orders placed (this is information for BPA alerting)       ACE/ARB/ARNI NOT PRESCRIBED            Physical Examination:   Temp:   [98.3  F (36.8  C)-99  F (37.2  C)] 99  F (37.2  C)  Pulse:  [] 112  Heart Rate:  [] 115  Resp:  [8-26] 16  BP: ()/(46-90) 99/64  SpO2:  [86 %-100 %] 95 %    Intake/Output Summary (Last 24 hours) at 5/19/2020 0750  Last data filed at 5/19/2020 0600  Gross per 24 hour   Intake 2819.91 ml   Output 500 ml   Net 2319.91 ml     Wt Readings from Last 4 Encounters:   05/20/20 78.7 kg (173 lb 8 oz)   05/13/20 73.9 kg (163 lb)   04/17/20 71.7 kg (158 lb)   03/23/20 74.9 kg (165 lb 3.2 oz)     BP - Mean:  [] 78  Resp: 16    No lab results found in last 7 days.    GEN: no acute distress, sitting up in chair.   HEENT: head ncat, sclera anicteric, trachea midline   PULM: unlabored breathing. No crackles. On room air  CV/COR: irregularly irregular and tachycardic, soft systolic murmur.    ABD: soft. TTP in lower abdomen bilaterally. Hypoactive bowel sounds.   MSK/EXT:  Bruising at L iliac crest anteriorly, mild on right. TTP. No LE edema noted  NEURO: grossly intact. Speech/language wnl.   SKIN: no obvious rash. Bruising in left iliac crest as well as scrotum and penis   : significant edema and hematoma present in scrotum, with tense edema. Penis with tense hematoma/edema as well. This seems to be slightly improved compared to yesterday.   LINES: clean, dry intact         Data:   All data and imaging reviewed.     ROUTINE ICU LABS (Last four results)  CMP  Recent Labs   Lab 05/20/20  0415 05/19/20  0850 05/19/20  0506 05/18/20 2025 05/18/20  0831    141  --  141 140   POTASSIUM 3.6 4.9  --  4.3 3.9   CHLORIDE 111* 113*  --  111* 109   CO2 22 19*  --  20 24   ANIONGAP 7 9  --  10 7   GLC 93 115*  --  155* 84   BUN 12 13  --  8 8   CR 0.79 1.08  --  0.86 0.84   GFRESTIMATED 87 66  --  84 84   GFRESTBLACK >90 76  --  >90 >90   ULISSES 7.8* 8.4*  --  8.8 8.5   MAG  --   --  2.4* 1.8  --    PHOS  --   --  4.6*  --   --      CBC  Recent Labs   Lab 05/20/20  0415 05/19/20  1736 05/19/20  1242 05/19/20  0911   05/18/20 2025 05/18/20 1935 05/18/20  0831   WBC 17.9*  --   --   --   --  21.4* 23.8* 11.9*   RBC 2.61*  --   --   --   --  3.80* 3.21* 3.77*   HGB 8.5* 9.0* 9.7* 10.3*   < > 12.1* 10.6* 12.4*   HCT 24.5*  --   --   --   --  36.7* 32.7* 38.2*   MCV 94  --   --   --   --  97 102* 101*   MCH 32.6  --   --   --   --  31.8 33.0 32.9   MCHC 34.7  --   --   --   --  33.0 32.4 32.5   RDW 17.1*  --   --   --   --  16.8* 15.2* 14.9     --   --   --   --  238 261 310    < > = values in this interval not displayed.     INR  Recent Labs   Lab 05/18/20 2025 05/18/20  0831   INR 1.51* 1.18*     Arterial Blood GasNo lab results found in last 7 days.    All cultures:  No results for input(s): CULT in the last 168 hours.  Recent Results (from the past 24 hour(s))   US Lower Extremity Arterial Duplex Left    Narrative    US LOWER EXTREMITY ARTERIAL DUPLEX LEFT   5/19/2020 3:31 PM     HISTORY: Hematoma 2/2 catheterization, with potential vascular  compromise. Follow up imaging per vascular surgery.    COMPARISON: Ultrasound 5/18/2020, CT 5/18/2020.     FINDINGS: Color Doppler and spectral waveform analysis was performed  throughout the left groin. The left common femoral, superficial  femoral and profunda femoral arteries are patent. No evidence of  pseudoaneurysm or active bleed. There is a large hematoma in the left  groin measuring 7.2 x 6.7 x 2.7 cm.      Impression    IMPRESSION:   1. No pseudoaneurysm or active bleeding.  2. Hematoma in the left groin measuring 7.2 x 6.7 x 2.7 cm.    HAY TEJEDA DO                 Billing: This patient is critically ill: Yes, due to shock state requiring pressors. Total critical care time today 40 min.    Gerardo Cronin MD    Department of Medicine, Division of Pulmonary, Allergy, Critical Care, and Sleep Medicine  Pager: [phone]

## 2020-05-20 NOTE — PLAN OF CARE
OT- Evaluation and treatment initiated. Pt lives in a two story home with his spouse. Prior pt independent in all ADLs and some IADLs.   Discharge Planner OT   Patient plan for discharge: Home   Current status: Pt went from sit<>Stand with CGA of 2. Pt took 5 steps to the bedside chair with minimum assist of 2 and walker. Pt transferred to/from the chair with minimum assist. HR elevated with activity, see vitals flow sheet for details. RN aware of vitals.   Barriers to return to prior living situation: Current level of A for I/ADLs, stairs   Recommendations for discharge: TCU  Rationale for recommendations: Pt will benefit from skilled therapy to address safety and independence in I/ADLs. Pt hopeful to return home, will continue to assess progress.        Entered by: Du Butler 05/20/2020 12:31 PM

## 2020-05-20 NOTE — PLAN OF CARE
Pt is alert and oriented x4. Hematoma is unchanged this shift. Scrotal and penile edema improving slightly. Pt rates pain at a 2. Pulses via doppler in lower extremities. Remains in A-fib with hr in 80-90's. Levophed at 0.1 currently. Pt voiding on own. Will continue to monitor.

## 2020-05-20 NOTE — PLAN OF CARE
[id number]  Neuro: A&Ox4, up to chair w OCTAVIO, TERRI DAMIAN  CV: A-fib rate 130-160's-given digoxin & rate now more controlled 100's. Levo MAP>65  Pulm: LS dim, room air. Pulmonary hygiene w/ encouragement   GI: Low fat diet, pt ate one meal of soup and pudding. No BM.  : urinating with urinal, good UO  Skin: lt groin and scrotal hematoma  Access: Rt CVC, PIVx2  Plan: wean levo as able. Cont to monitor.  Please see flowsheet for detailed assessments and drip titrations.  Conchis Wright RN 05/20/20 6:50 PM

## 2020-05-20 NOTE — PLAN OF CARE
PT: Order received; per chart review and conversation with OT, patient having poor tolerance to mobility today with HR into 140's and scrotal edema limiting to mobility. Will reschedule eval for 5/21.

## 2020-05-20 NOTE — PROGRESS NOTES
Urology Progress NOte     Scrotums is still swollen but able to urinate    Plan: scrotal support; will take time (weeks for hematoma to dissolve)            Check Post void residuals as needed     Symone Millard MD

## 2020-05-20 NOTE — PROGRESS NOTES
05/20/20 1047   Quick Adds   Type of Visit Initial Occupational Therapy Evaluation   Living Environment   Lives With spouse   Living Arrangements house  (two story home )   Home Accessibility stairs to enter home;stairs within home   Transportation Anticipated family or friend will provide  (Son does the driving)   Living Environment Comment Spouse uses a walker and can assist to an extent. Son lives nearby and can assist as needed.    Self-Care   Usual Activity Tolerance good   Current Activity Tolerance moderate   Equipment Currently Used at Home cane, straight;walker, rolling;raised toilet;shower chair   Activity/Exercise/Self-Care Comment Pt does not use an AD in the home however does use the walker outside of the home.    Functional Level   Ambulation 1-->assistive equipment   Transferring 1-->assistive equipment   Toileting 1-->assistive equipment   Bathing 1-->assistive equipment  (shower chair )   Dressing 0-->independent   Fall history within last six months no   General Information   Onset of Illness/Injury or Date of Surgery - Date 05/18/20   Referring Physician Patrick Davis NP    Patient/Family Goals Statement Home    Additional Occupational Profile Info/Pertinent History of Current Problem Per chart: 74 year old male with history of MEL, recent shingles, PVD, HLD, CVA 2015, A fib, severe aortic stenosis, and mild to moderate mitral stenosis admitted 5/18/2020 for elective RCA catheterization with stent placement in anticipation of TAVR. Post procedure, he developed persistent cath site bleeding and massive hematoma. He was also hypotensive, and was transferred to the ICU.    Cognitive Status Examination   Orientation orientation to person, place and time   Level of Consciousness alert   Mobility   Bed Mobility Bed mobility skill: Supine to sit   Bed Mobility Skill: Supine to Sit   Level of [place]: Supine/Sit moderate assist (50% patients effort)   Physical Assist/Nonphysical Assist:  "Supine/Sit 2 persons   Transfer Skills   Transfer Transfer Safety Analysis Bed/Chair;Transfer Skill: Stand to Sit;Transfer Safety Analysis Sit/Stand   Transfer Skill: Bed to Chair/Chair to Bed   Level of [place]: Bed to Chair minimum assist (75% patients effort)   Physical Assist/Nonphysical Assist: Bed to Chair 1 person assist   Transfer Skill: Sit to Stand   Level of [place]: Sit/Stand contact guard   Physical Assist/Nonphysical Assist: Sit/Stand 2 persons;set-up required;verbal cues   Instrumental Activities of Daily Living (IADL)   Previous Responsibilities meal prep;medication management   IADL Comments Son assists in home management tasks, laundry, driving, yardwork.    General Therapy Interventions   Planned Therapy Interventions ADL retraining;IADL retraining;cognition;transfer training   Clinical Impression   Criteria for Skilled Therapeutic Interventions Met yes, treatment indicated   OT Diagnosis decreased independence in I/ADLs    Influenced by the following impairments decreased independence in I/ADLs    Assessment of Occupational Performance 1-3 Performance Deficits   Identified Performance Deficits decreased independence in I/ADLs  (dressing, bathing, toileting)   Clinical Decision Making (Complexity) Low complexity   Therapy Frequency Daily   Predicted Duration of Therapy Intervention (days/wks) 4 days   Anticipated Discharge Disposition Transitional Care Facility   Risks and Benefits of Treatment have been explained. Yes   Patient, Family & other staff in agreement with plan of care Yes   [facility] TM \"6 Clicks\"   2016, Trustees of [facility], under license to [employer].  All rights reserved.   6 Clicks Short Forms Daily Activity Inpatient Short Form   [facility] AM-PAC  \"6 Clicks\" Daily Activity Inpatient Short Form   1. Putting on and taking off regular lower body clothing? 3 - A Little   2. Bathing (including washing, rinsing, drying)? 2 - A Lot   3. " Toileting, which includes using toilet, bedpan or urinal? 3 - A Little   4. Putting on and taking off regular upper body clothing? 4 - None   5. Taking care of personal grooming such as brushing teeth? 3 - A Little   6. Eating meals? 4 - None   Daily Activity Raw Score (Score out of 24.Lower scores equate to lower levels of function) 19   Total Evaluation Time   Total Evaluation Time (Minutes) 8

## 2020-05-20 NOTE — PROGRESS NOTES
"       Assessment and Plan:     Efrem Ramos is a 74 year old male who was admitted on 5/18/2020 after outpatient PTCA/stent of RCA prior to TAVR.      1. Postprocedural shock, hemorrhagic, complicated by severe aortic stenosis. Ongoing hypotension requiring NorEpi.   2. Severe aortic stenosis awaiting transcatheter aortic valve implant as an outpatient after recovery from PCI.  3. Severe single-vessel right coronary artery disease status post 4 stents 5/18/20. Complicated by RFA bleeding into sctrotum and groin without retroperitoneal bleeding or pseudoaneurysm.   4. Chronic atrial fibrillation on Eliquis PTA, with history of stroke. Currently rate controlled except during activity. Off Eliquis currently due to bleeding. Off metoprolol due to hypotension   5. Peripheral vascular disease  6. Blood loss anemia, Hgb 8.5 (12.1 prior to PCI and bleeding)    Continue aspirin and Plavix  Holding Eliquis for now. Plan to restart Eliquis and stop aspirin with bleeding risk has subsided.   Wean NorEpi as tolerated   Continue digoxin  TAVR not yet scheduled. He should recover well from this episode prior to TAVR if possible.        RAJI BURNETT MD        Interval History:   Painful groin, especially with movement. Feeling a little discouraged.          Medications:       aspirin  81 mg Oral Daily     atorvastatin  40 mg Oral Daily     clopidogrel  75 mg Oral Daily     docusate sodium  100 mg Oral BID     gabapentin  600 mg Oral QPM     lactated ringers  500 mL Intravenous Once     sodium chloride (PF)  3 mL Intracatheter Q8H            Physical Exam:   Blood pressure (!) 88/63, pulse 134, temperature 99  F (37.2  C), temperature source Oral, resp. rate 8, height 1.702 m (5' 7\"), weight 78.7 kg (173 lb 8 oz), SpO2 95 %.    Vitals:    05/18/20 0807 05/19/20 0200 05/20/20 0400   Weight: 74.3 kg (163 lb 12.8 oz) 76.9 kg (169 lb 8.5 oz) 78.7 kg (173 lb 8 oz)         Intake/Output Summary (Last 24 hours) at 5/20/2020 " 1328  Last data filed at 5/20/2020 1200  Gross per 24 hour   Intake 1845.77 ml   Output 1125 ml   Net 720.77 ml       05/15 0700 - 05/20 0659  In: 4905.47 [P.O.:610; I.V.:2795.47]  Out: 1520 [Urine:1520]  Net: 3385.47    Exam:  GENERAL APPEARANCE ADULT: Alert, in no acute distress  RESP: lungs clear to auscultation   CV: irregularly irregular rhythm with systolic murmur.  ABDOMEN: normal bowel sounds, soft, nontender, no hepatosplenomegaly or other masses  EXTREMITIES: No edema, ecchymosis over RFA site, penis and scrotum, which are enlarged and swollen.          Data:   LABS (Last four results)  CMP  Recent Labs   Lab 05/20/20  0415 05/19/20  0850 05/19/20  0506 05/18/20 2025 05/18/20  0831    141  --  141 140   POTASSIUM 3.6 4.9  --  4.3 3.9   CHLORIDE 111* 113*  --  111* 109   CO2 22 19*  --  20 24   ANIONGAP 7 9  --  10 7   GLC 93 115*  --  155* 84   BUN 12 13  --  8 8   CR 0.79 1.08  --  0.86 0.84   GFRESTIMATED 87 66  --  84 84   GFRESTBLACK >90 76  --  >90 >90   ULISSES 7.8* 8.4*  --  8.8 8.5   MAG  --   --  2.4* 1.8  --    PHOS  --   --  4.6*  --   --      CBC  Recent Labs   Lab 05/20/20  0415 05/19/20  1736 05/19/20  1242 05/19/20  0911  05/18/20 2025 05/18/20  1935 05/18/20  0831   WBC 17.9*  --   --   --   --  21.4* 23.8* 11.9*   RBC 2.61*  --   --   --   --  3.80* 3.21* 3.77*   HGB 8.5* 9.0* 9.7* 10.3*   < > 12.1* 10.6* 12.4*   HCT 24.5*  --   --   --   --  36.7* 32.7* 38.2*   MCV 94  --   --   --   --  97 102* 101*   MCH 32.6  --   --   --   --  31.8 33.0 32.9   MCHC 34.7  --   --   --   --  33.0 32.4 32.5   RDW 17.1*  --   --   --   --  16.8* 15.2* 14.9     --   --   --   --  238 261 310    < > = values in this interval not displayed.     INR  Recent Labs   Lab 05/18/20 2025 05/18/20  0831   INR 1.51* 1.18*     TROPONINS   Lab Results   Component Value Date    TROPI 0.645 () 05/19/2020    TROPI 0.692 () 05/19/2020    TROPI 0.802 () 05/19/2020    TROPI 0.027 09/05/2018    TROPI   07/27/2016     <0.015  The 99th percentile for upper reference range is 0.045 ug/L.  Troponin values in   the range of 0.045 - 0.120 ug/L may be associated with risks of adverse   clinical events.                                                                                                                 RAJI BURNETT MD

## 2020-05-21 ENCOUNTER — APPOINTMENT (OUTPATIENT)
Dept: OCCUPATIONAL THERAPY | Facility: CLINIC | Age: 75
DRG: 246 | End: 2020-05-21
Payer: MEDICARE

## 2020-05-21 ENCOUNTER — APPOINTMENT (OUTPATIENT)
Dept: ULTRASOUND IMAGING | Facility: CLINIC | Age: 75
DRG: 246 | End: 2020-05-21
Attending: INTERNAL MEDICINE
Payer: MEDICARE

## 2020-05-21 LAB
ALBUMIN SERPL-MCNC: 2.2 G/DL (ref 3.4–5)
ANION GAP SERPL CALCULATED.3IONS-SCNC: 4 MMOL/L (ref 3–14)
BLD PROD TYP BPU: NORMAL
BLD UNIT ID BPU: 0
BLOOD PRODUCT CODE: NORMAL
BPU ID: NORMAL
BUN SERPL-MCNC: 7 MG/DL (ref 7–30)
CALCIUM SERPL-MCNC: 8 MG/DL (ref 8.5–10.1)
CHLORIDE SERPL-SCNC: 110 MMOL/L (ref 94–109)
CO2 SERPL-SCNC: 26 MMOL/L (ref 20–32)
CREAT SERPL-MCNC: 0.67 MG/DL (ref 0.66–1.25)
ERYTHROCYTE [DISTWIDTH] IN BLOOD BY AUTOMATED COUNT: 16.7 % (ref 10–15)
GFR SERPL CREATININE-BSD FRML MDRD: >90 ML/MIN/{1.73_M2}
GLUCOSE SERPL-MCNC: 94 MG/DL (ref 70–99)
HCT VFR BLD AUTO: 23 % (ref 40–53)
HGB BLD-MCNC: 7.8 G/DL (ref 13.3–17.7)
MCH RBC QN AUTO: 32.1 PG (ref 26.5–33)
MCHC RBC AUTO-ENTMCNC: 33.9 G/DL (ref 31.5–36.5)
MCV RBC AUTO: 95 FL (ref 78–100)
PLATELET # BLD AUTO: 203 10E9/L (ref 150–450)
POTASSIUM SERPL-SCNC: 3.6 MMOL/L (ref 3.4–5.3)
RBC # BLD AUTO: 2.43 10E12/L (ref 4.4–5.9)
SODIUM SERPL-SCNC: 140 MMOL/L (ref 133–144)
TRANSFUSION STATUS PATIENT QL: NORMAL
TRANSFUSION STATUS PATIENT QL: NORMAL
WBC # BLD AUTO: 13 10E9/L (ref 4–11)

## 2020-05-21 PROCEDURE — 97530 THERAPEUTIC ACTIVITIES: CPT | Mod: GO

## 2020-05-21 PROCEDURE — 25000125 ZZHC RX 250: Performed by: INTERNAL MEDICINE

## 2020-05-21 PROCEDURE — 25000132 ZZH RX MED GY IP 250 OP 250 PS 637: Mod: GY | Performed by: INTERNAL MEDICINE

## 2020-05-21 PROCEDURE — 25800030 ZZH RX IP 258 OP 636: Performed by: INTERNAL MEDICINE

## 2020-05-21 PROCEDURE — 25000128 H RX IP 250 OP 636: Performed by: INTERNAL MEDICINE

## 2020-05-21 PROCEDURE — 82040 ASSAY OF SERUM ALBUMIN: CPT | Performed by: INTERNAL MEDICINE

## 2020-05-21 PROCEDURE — 99232 SBSQ HOSP IP/OBS MODERATE 35: CPT | Performed by: INTERNAL MEDICINE

## 2020-05-21 PROCEDURE — 80048 BASIC METABOLIC PNL TOTAL CA: CPT | Performed by: INTERNAL MEDICINE

## 2020-05-21 PROCEDURE — P9016 RBC LEUKOCYTES REDUCED: HCPCS | Performed by: INTERNAL MEDICINE

## 2020-05-21 PROCEDURE — 20000003 ZZH R&B ICU

## 2020-05-21 PROCEDURE — 93926 LOWER EXTREMITY STUDY: CPT | Mod: LT

## 2020-05-21 PROCEDURE — 97535 SELF CARE MNGMENT TRAINING: CPT | Mod: GO

## 2020-05-21 PROCEDURE — 85027 COMPLETE CBC AUTOMATED: CPT | Performed by: INTERNAL MEDICINE

## 2020-05-21 PROCEDURE — 99291 CRITICAL CARE FIRST HOUR: CPT | Performed by: INTERNAL MEDICINE

## 2020-05-21 RX ORDER — DIGOXIN 0.25 MG/ML
125 INJECTION INTRAMUSCULAR; INTRAVENOUS DAILY
Status: DISCONTINUED | OUTPATIENT
Start: 2020-05-21 | End: 2020-05-22

## 2020-05-21 RX ADMIN — GABAPENTIN 600 MG: 300 CAPSULE ORAL at 21:09

## 2020-05-21 RX ADMIN — SODIUM CHLORIDE, POTASSIUM CHLORIDE, SODIUM LACTATE AND CALCIUM CHLORIDE: 600; 310; 30; 20 INJECTION, SOLUTION INTRAVENOUS at 19:24

## 2020-05-21 RX ADMIN — SODIUM CHLORIDE, POTASSIUM CHLORIDE, SODIUM LACTATE AND CALCIUM CHLORIDE: 600; 310; 30; 20 INJECTION, SOLUTION INTRAVENOUS at 02:39

## 2020-05-21 RX ADMIN — ASPIRIN 81 MG: 81 TABLET, DELAYED RELEASE ORAL at 08:35

## 2020-05-21 RX ADMIN — SODIUM CHLORIDE, POTASSIUM CHLORIDE, SODIUM LACTATE AND CALCIUM CHLORIDE: 600; 310; 30; 20 INJECTION, SOLUTION INTRAVENOUS at 10:50

## 2020-05-21 RX ADMIN — DIGOXIN 125 MCG: 0.25 INJECTION INTRAMUSCULAR; INTRAVENOUS at 08:36

## 2020-05-21 RX ADMIN — NOREPINEPHRINE BITARTRATE 0.03 MCG/KG/MIN: 1 INJECTION, SOLUTION, CONCENTRATE INTRAVENOUS at 22:27

## 2020-05-21 RX ADMIN — ATORVASTATIN CALCIUM 40 MG: 40 TABLET, FILM COATED ORAL at 08:35

## 2020-05-21 RX ADMIN — CLOPIDOGREL BISULFATE 75 MG: 75 TABLET, FILM COATED ORAL at 08:35

## 2020-05-21 RX ADMIN — ACETAMINOPHEN 650 MG: 325 TABLET, FILM COATED ORAL at 08:35

## 2020-05-21 ASSESSMENT — ACTIVITIES OF DAILY LIVING (ADL)
ADLS_ACUITY_SCORE: 22
ADLS_ACUITY_SCORE: 21
ADLS_ACUITY_SCORE: 22
ADLS_ACUITY_SCORE: 22
ADLS_ACUITY_SCORE: 21
ADLS_ACUITY_SCORE: 21

## 2020-05-21 ASSESSMENT — MIFFLIN-ST. JEOR: SCORE: 1498.63

## 2020-05-21 NOTE — PROGRESS NOTES
Critical Care Progress Note  05/21/2020    Name: Efrem Ramos MRN#: [id number]   Age: 74 year old YOB: 1945     [facility] Day# 3           Problem List:   Principal Problem:    Coronary artery disease involving native coronary artery of native heart without angina pectoris  Active Problems:    CAD (coronary artery disease)-moderate  nonobstructive 2 vessel    Status post coronary angiogram    Bleeding           Summary/Hospital Course:   74 year old male with history of MEL, recent shingles, PVD, HLD, CVA 2015, A fib, severe aortic stenosis, and mild to moderate mitral stenosis admitted 5/18/2020 for elective RCA catheterization with stent placement in anticipation of TAVR. Post procedure, he developed persistent cath site bleeding and massive hematoma. He was also hypotensive, and was transferred to the ICU. He was transfused 3 units PRBCs. CT contrast was done, which found active extravasation in the common iliac vs proximal common femoral. Femstop was repositioned by vascular surgery. He had persistent pressor needs after ICU admission, and central line was placed on 5/19 in the early morning.     Assessment and plan :     Efrem Ramos is a 74 year old male admitted on 5/18/2020 for RCA catheterization and stent placement in anticipation of TAVR, with course complicated by post op bleeding/hematoma and shock state requiring pressors, in the setting of a history of MEL, recent shingles, PVD, HLD, CVA 2015, A fib, severe aortic stenosis, and mild to moderate mitral stenosis.  I have personally reviewed the daily labs, imaging studies, cultures and discussed the case with referring physician and consulting physicians.   My assessment and plan by system for this patient is as follows:    Neurology/Psychiatry:   1. Analgesia  Plan  -acetaminophen 650 mg q4h PRN  -gabapentin 600 mg nightly     Cardiovascular:   1. Single vessel CAD (RCA) s/p 4 stents on 5/18, complicated by cath site  bleeding/hematoma  2. Severe Aortic stenosis  3. Mild-moderate mitral stenosis  4. Atrial fibrillation with RVR  5. Shock state, acute blood loss vs undifferentiated vs vasoplegia.   Plan  -appreciate cardiology assistance  -Continue norepinephrine, wean as able, MAP goal of 60 (baseline SBPs often in 90s per chart review and patient)  -Follow serial hemoglobin q8h, may need blood if continues to decrease   -holding eliquis given bleeding, with plan to restart eliquis and stop aspirin when bleeding risk subsides.   -Holding home metoprolol given ongoing pressor requirement  -redose digoxin 125 mcg today.   -TAVR to hopefully be done as outpatient after patient is fully recovered.   -asa 81 mg daily (for now)  -clopidogrel 75 mg daily   -atorvastatin 40 mg dialy     Pulmonary/Ventilator Management:   1. Acute hypoxic respiratory insufficiency, resolved  2. 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 Emphysema on imaging: not on inhalers as outpatient  Plan  -albuterol PRN    GI/Nutrition :   1.  Nutrition:  Plan  -regular diet  -docusate 100 mg BID    Renal/Fluids/Electrolytes/:  1. Penile and scrotal edema and hematoma: slow improvement   2. Hypotension, possible hypovolemia.   Plan  - monitor function and electrolytes as needed with replacement per ICU protocols.  - generally avoid nephrotoxic agents such as NSAID, IV contrast unless specifically required  - adjust medications as needed for renal clearance  - follow I/O's as appropriate.  - LR at 125 mL/hr until PO intake improves    Infectious Disease:    1. No acute issues  Plan:  -monitor    Endocrine:   1. No acute issues  Plan  - ICU insulin protocol, goal sugar <180    Hematology/Oncology:   1.  Acute blood loss anemia with hemodynamic instability   Plan  - Pressors as above, wean as able  -Hgb goal >7, transfuse if needed.   -follow Hgb q8h  -Holding eliquis with bleeding as above  -Dual antiplatelet therapy  with aspirin and plavix for recent heart stents (placed 5/18)    MSK/Rheum:  1. No acute issues  Plan:  -monitor    IV/Access:   1. Venous access - right internal jugular CVC   2. Arterial access - none  3.  Plan  - central access required and necessary    ICU Prophylaxis:   1. DVT: mechanical  2. VAP: Not indicated  3. Stress Ulcer: Not indicated  4. Restraints: Not indicated  5. Wound care - per unit routine   6. Feeding - regular diet  7. Family Update: not yet done  8. Disposition - remain in ICU given pressor requirement    Key goals for next 24 hours:   1. Wean pressors as able  2.   Repeat arterial doppler LLE to look for extension of hematoma/bleeding       Interim History/Overnight Events:   No acute events overnight. Hematoma noted to be somewhat enlarged. Continues to have some pain in lower abdomen and groin. Feeling weak. Low appetite. No nausea. Some shortness of breath with movement, and left lateral chest pain (has been present for a couple days). No fevers, but felt mildly chilled last evening. No other complaints. Had BM, still urinating.        Key Medications:       aspirin  81 mg Oral Daily     atorvastatin  40 mg Oral Daily     clopidogrel  75 mg Oral Daily     docusate sodium  100 mg Oral BID     gabapentin  600 mg Oral QPM     sodium chloride (PF)  3 mL Intracatheter Q8H       - MEDICATION INSTRUCTIONS -       - MEDICATION INSTRUCTIONS -       - MEDICATION INSTRUCTIONS -       - MEDICATION INSTRUCTIONS -       lactated ringers 125 mL/hr at 05/21/20 0239     norepinephrine 0.09 mcg/kg/min (05/21/20 0030)     Percutaneous Coronary Intervention orders placed (this is information for BPA alerting)       Percutaneous Coronary Intervention orders placed (this is information for BPA alerting)       ACE/ARB/ARNI NOT PRESCRIBED            Physical Examination:   Temp:  [98.1  F (36.7  C)-99  F (37.2  C)] 98.1  F (36.7  C)  Pulse:  [] 129  Heart Rate:  [] 114  Resp:  [7-29] 15  BP:  ()/(50-95) 98/77  SpO2:  [83 %-99 %] 95 %    Intake/Output Summary (Last 24 hours) at 5/19/2020 0750  Last data filed at 5/19/2020 0600  Gross per 24 hour   Intake 2819.91 ml   Output 500 ml   Net 2319.91 ml     Wt Readings from Last 4 Encounters:   05/21/20 81 kg (178 lb 9.2 oz)   05/13/20 73.9 kg (163 lb)   04/17/20 71.7 kg (158 lb)   03/23/20 74.9 kg (165 lb 3.2 oz)     BP - Mean:  [] 81  Resp: 15    No lab results found in last 7 days.    GEN: no acute distress, lying in bed    HEENT: head ncat, sclera anicteric, trachea midline   PULM: unlabored breathing. Mild crackles at bilateral bases. Somewhat decreased air movement at bases. On room air  CV/COR: irregularly irregular and tachycardic, soft systolic murmur.    ABD: soft. TTP in lower abdomen bilaterally. Hypoactive bowel sounds.   MSK/EXT:  Bruising at L iliac crest, with extension to left lower back, mild on right. TTP. No LE edema noted  NEURO: grossly intact. Speech/language wnl.   SKIN: no obvious rash. Bruising in lower abdomen as well as scrotum and penis   : Improvement in scrotal edema/hematoma. Ongoing significant edema and hematoma present in penis.  LINES: clean, dry intact         Data:   All data and imaging reviewed.     ROUTINE ICU LABS (Last four results)  CMP  Recent Labs   Lab 05/21/20  0530 05/20/20  0415 05/19/20  0850 05/19/20  0506 05/18/20 2025    140 141  --  141   POTASSIUM 3.6 3.6 4.9  --  4.3   CHLORIDE 110* 111* 113*  --  111*   CO2 26 22 19*  --  20   ANIONGAP 4 7 9  --  10   GLC 94 93 115*  --  155*   BUN 7 12 13  --  8   CR 0.67 0.79 1.08  --  0.86   GFRESTIMATED >90 87 66  --  84   GFRESTBLACK >90 >90 76  --  >90   ULISSES 8.0* 7.8* 8.4*  --  8.8   MAG  --   --   --  2.4* 1.8   PHOS  --   --   --  4.6*  --      CBC  Recent Labs   Lab 05/21/20  0545 05/20/20  0415 05/19/20  1736 05/19/20  1242  05/18/20 2025 05/18/20  1935   WBC 13.0* 17.9*  --   --   --  21.4* 23.8*   RBC 2.43* 2.61*  --   --   --  3.80*  3.21*   HGB 7.8* 8.5* 9.0* 9.7*   < > 12.1* 10.6*   HCT 23.0* 24.5*  --   --   --  36.7* 32.7*   MCV 95 94  --   --   --  97 102*   MCH 32.1 32.6  --   --   --  31.8 33.0   MCHC 33.9 34.7  --   --   --  33.0 32.4   RDW 16.7* 17.1*  --   --   --  16.8* 15.2*    209  --   --   --  238 261    < > = values in this interval not displayed.     INR  Recent Labs   Lab 05/18/20 2025 05/18/20  0831   INR 1.51* 1.18*     Arterial Blood GasNo lab results found in last 7 days.    All cultures:  No results for input(s): CULT in the last 168 hours.  No results found for this or any previous visit (from the past 24 hour(s)).              Billing: This patient is critically ill: Yes, due to shock state requiring pressors. Total critical care time today 35 min.    Gerardo Cronin MD    Department of Medicine, Division of Pulmonary, Allergy, Critical Care, and Sleep Medicine  Pager: [phone]

## 2020-05-21 NOTE — PROVIDER NOTIFICATION
During shift change/bedside patient hand off the patient's ecchymosis on the left groin hematoma was found to have extended some (about 8-10cm long by 3-4cm wide) since it was last checked by the off-going RN . The area is soft. No bruit. Pulses are palpable in the lower extremities bilaterally.   Dr. Murphy was paged and notified. Previous ultrasound of the area had not shown any active bleeding or pseudoaneurysm. Per Dr. Murphy if hematoma/ecchymotic area continues to increase in size have a repeat ultrasound of the area ordered by the intensivist physician.   Junior Pack RN 7:57 PM 05/20/20

## 2020-05-21 NOTE — PLAN OF CARE
[id number]  Neuro: A&Ox4, up to chair w A2, RICKIE, TERRI  CV: A-fib rate 130-160's-given digoxin & rate now more controlled 100's. Levo MAP>65. Given 1 unit PRBCs  Pulm: LS dim, room air. Pulmonary hygiene w/ encouragement   GI: Low fat diet, pt ate one meal. BMx1  : urinating with urinal, good UO  Skin: lt groin and scrotal hematoma  Access: Rt CVC, PIVx2  Plan: wean levo as able. Cont to monitor.  Please see flowsheet for detailed assessments and drip titrations.  Conchis Wright RN 05/21/20 6:57 PM          Monitor ins and outs  Monitor weight  Monitor for volume overload  Seen by Cardiology has an ICD pacemaker in place  Last EF was 20%

## 2020-05-21 NOTE — PROGRESS NOTES
Assessment and Plan:     Efrem Ramos is a 74 year old male who was admitted on 5/18/2020 after outpatient PCI and stenting of the RCA prior to consideration for TAVR.      1. Postprocedural shock, hemorrhagic, complicated by severe aortic stenosis. Ongoing hypotension requiring NorEpi.   2. Severe aortic stenosis   - Awaiting TAVR as an outpatient after recovery from PCI, problem #1.  3. Severe single-vessel right coronary artery disease   - Status post 4 stents 5/18/20. - Complicated by RFA bleeding into sctrotum and groin without retroperitoneal bleeding or pseudoaneurysm.   4. Chronic atrial fibrillation   - Anticoagulated on Eliquis PTA, with history of stroke.   - Currently rate controlled except during activity. Off Eliquis currently due to bleeding.   - Off PTA metoprolol due to hypotension. Rate controlled with digoxin 125 mcg daily.  5. Peripheral vascular disease  6. Acute blood loss anemia  - Hgb trending down to 7.8 today from 8.5 yesterday (12.1 prior to PCI and bleeding).    Plan:  1. Continue with aspirin and plavix.  2. Continue holding Eliquis for now. Plan to restart Eliquis and stop aspirin once bleeding risk has subsided and anemia improved.   3. Wean NorEpi as tolerated.  4. Continue digoxin for rate control.  5. TAVR not yet scheduled. He should recover well from this episode prior to TAVR if possible.      Pedro Grimes NP        Interval History:   Groin remains painful with any movement but tolerable at rest. Currently denies any chest pain or shortness of breath. BP remain soft requiring NorEpi. Hgb trending down to 7.          Medications:       aspirin  81 mg Oral Daily     atorvastatin  40 mg Oral Daily     clopidogrel  75 mg Oral Daily     digoxin  125 mcg Intravenous Daily     docusate sodium  100 mg Oral BID     gabapentin  600 mg Oral QPM     sodium chloride (PF)  3 mL Intracatheter Q8H          Physical Exam:   Blood pressure 131/74, pulse 112, temperature 98.2  F  "(36.8  C), temperature source Oral, resp. rate 21, height 1.702 m (5' 7\"), weight 81 kg (178 lb 9.2 oz), SpO2 93 %.    Vitals:    05/18/20 0807 05/19/20 0200 05/20/20 0400 05/21/20 0600   Weight: 74.3 kg (163 lb 12.8 oz) 76.9 kg (169 lb 8.5 oz) 78.7 kg (173 lb 8 oz) 81 kg (178 lb 9.2 oz)     Intake/Output Summary (Last 24 hours) at 5/21/2020 1144  Last data filed at 5/21/2020 0830  Gross per 24 hour   Intake 2729.3 ml   Output 2550 ml   Net 179.3 ml     05/16 0700 - 05/21 0659  In: 7622.17 [P.O.:660; I.V.:4962.17]  Out: 3795 [Urine:3795]  Net: 3827.17    Physical Exam:  CONSTITUTIONAL: Alert, in no acute distress  HEENT: Normocephalic,atraumatic. PERRL. EOMs intact.   RESP: Lungs clear to auscultation bilaterally.  CV: Irregularly irregular rhythm, controlled rate, with grade 2/6systolic murmur best heard at the LUSB.  EXTREMITIES: Moves all extremities well and symmetrically. No lower extremity edema. Significant ecchymosis surrounding the right femoral site, penis, and scrotum. Penile and scotal edema noted.   NEURO: No gross focal deficits. Speech clear.        Data:   LABS (Last four results)  CMP  Recent Labs   Lab 05/21/20  0530 05/20/20  0415 05/19/20  0850 05/19/20  0506 05/18/20 2025    140 141  --  141   POTASSIUM 3.6 3.6 4.9  --  4.3   CHLORIDE 110* 111* 113*  --  111*   CO2 26 22 19*  --  20   ANIONGAP 4 7 9  --  10   GLC 94 93 115*  --  155*   BUN 7 12 13  --  8   CR 0.67 0.79 1.08  --  0.86   GFRESTIMATED >90 87 66  --  84   GFRESTBLACK >90 >90 76  --  >90   ULISSES 8.0* 7.8* 8.4*  --  8.8   MAG  --   --   --  2.4* 1.8   PHOS  --   --   --  4.6*  --      CBC  Recent Labs   Lab 05/21/20  0545 05/20/20  0415 05/19/20  1736 05/19/20  1242  05/18/20 2025 05/18/20  1935   WBC 13.0* 17.9*  --   --   --  21.4* 23.8*   RBC 2.43* 2.61*  --   --   --  3.80* 3.21*   HGB 7.8* 8.5* 9.0* 9.7*   < > 12.1* 10.6*   HCT 23.0* 24.5*  --   --   --  36.7* 32.7*   MCV 95 94  --   --   --  97 102*   MCH 32.1 32.6  --   " --   --  31.8 33.0   MCHC 33.9 34.7  --   --   --  33.0 32.4   RDW 16.7* 17.1*  --   --   --  16.8* 15.2*    209  --   --   --  238 261    < > = values in this interval not displayed.     INR  Recent Labs   Lab 05/18/20 2025 05/18/20  0831   INR 1.51* 1.18*     TROPONINS   Lab Results   Component Value Date    TROPI 0.645 () 05/19/2020    TROPI 0.692 () 05/19/2020    TROPI 0.802 () 05/19/2020    TROPI 0.027 09/05/2018    TROPI  07/27/2016     <0.015  The 99th percentile for upper reference range is 0.045 ug/L.  Troponin values in   the range of 0.045 - 0.120 ug/L may be associated with risks of adverse   clinical events.                                                                                                            Pedro Grimes NP

## 2020-05-21 NOTE — PLAN OF CARE
Discharge Planner OT   Patient plan for discharge: Home     Current status: BP 89/55,  BPM. Pt completed supine > sit with min A. CGA for sit <> stand and FWW and able to ambulate 5 steps to chair. HR up to 120 BPM. Pt able to tolerate standing x 40 seconds and marching in place however HR up in 140's therefore returned to sitting. BP 81/58 post activity. G/h tasks completed up in chair.     Barriers to return to prior living situation: Current level of A for I/ADLs, stairs     Recommendations for discharge: TCU    Rationale for recommendations: Pt would benefit from continued skilled OT services to improve independence and safety with ADL's and functional transfers as pt is not at baseline. Pending progress and LOS, pt may be safe to return home with spouse A. Will continue to update recs.           Entered by: Kendal Nolasco 05/21/2020 9:41 AM

## 2020-05-21 NOTE — PLAN OF CARE
PT: Reviewed chart, spoke with OT following patient. Pt demonstrates decreased activity tolerance with increased HR and decreased BP with minimal activity, will not currently tolerate two therapies.

## 2020-05-21 NOTE — PROGRESS NOTES
[facility]    Urology Progress Note     Assessment & Plan   Efrem Ramos is a 74 year old male who was admitted on 5/18/2020. Urology following for penoscrotal edema and hematoma following cardiac cath on 5/18-- stable.     Plan:   -scrotal support and elevation   -RN to please follow and chart postvoid residuals     Ave Madrid PA-C  [facility]   [email or web address]  Text Page (7:30am to 4:30pm)    Interval History   No interval changes or events   Voiding fairly well, 150-250cc at a time; unsure if he is emptying completely   *no PVRs charted   Urine has been clear    AVSS    Physical Exam   Temp: 98.1  F (36.7  C) Temp src: Oral BP: 100/63 Pulse: 98 Heart Rate: 96 Resp: 9 SpO2: 92 % O2 Device: None (Room air) Oxygen Delivery: 1 LPM  Vitals:    05/19/20 0200 05/20/20 0400 05/21/20 0600   Weight: 76.9 kg (169 lb 8.5 oz) 78.7 kg (173 lb 8 oz) 81 kg (178 lb 9.2 oz)     Vital Signs with Ranges  Temp:  [98.1  F (36.7  C)] 98.1  F (36.7  C)  Pulse:  [] 98  Heart Rate:  [] 96  Resp:  [7-29] 9  BP: ()/(50-95) 100/63  SpO2:  [83 %-100 %] 92 %  I/O last 3 completed shifts:  In: 2716.7 [P.O.:50; I.V.:2166.7; IV Piggyback:500]  Out: 2275 [Urine:2275]    Constitutional: Sitting up in chair, NAD  Eyes: no icterus  ENT: normocephalic   Respiratory: breathing unlabored   Cardiovascular: chest wall symmetric   GI: abd soft, NT, ND  Lymph/Hematologic: no le edema   Genitourinary: significant penoscrotal edema and ecchymosis present; tender to palpation   Skin: well perfused   Musculoskeletal: moves all extremities   Neurologic: no focal deficits   Neuropsychiatric: A&Ox3    Medications     - MEDICATION INSTRUCTIONS -       - MEDICATION INSTRUCTIONS -       - MEDICATION INSTRUCTIONS -       - MEDICATION INSTRUCTIONS -       lactated ringers 125 mL/hr at 05/21/20 0239     norepinephrine 0.06 mcg/kg/min (05/21/20 0849)     Percutaneous Coronary Intervention orders  placed (this is information for BPA alerting)       Percutaneous Coronary Intervention orders placed (this is information for BPA alerting)       ACE/ARB/ARNI NOT PRESCRIBED         aspirin  81 mg Oral Daily     atorvastatin  40 mg Oral Daily     clopidogrel  75 mg Oral Daily     digoxin  125 mcg Intravenous Daily     docusate sodium  100 mg Oral BID     gabapentin  600 mg Oral QPM     sodium chloride (PF)  3 mL Intracatheter Q8H       Data   Results for orders placed or performed during the hospital encounter of 05/18/20 (from the past 24 hour(s))   Basic metabolic panel   Result Value Ref Range    Sodium 140 133 - 144 mmol/L    Potassium 3.6 3.4 - 5.3 mmol/L    Chloride 110 (H) 94 - 109 mmol/L    Carbon Dioxide 26 20 - 32 mmol/L    Anion Gap 4 3 - 14 mmol/L    Glucose 94 70 - 99 mg/dL    Urea Nitrogen 7 7 - 30 mg/dL    Creatinine 0.67 0.66 - 1.25 mg/dL    GFR Estimate >90 >60 mL/min/[1.73_m2]    GFR Estimate If Black >90 >60 mL/min/[1.73_m2]    Calcium 8.0 (L) 8.5 - 10.1 mg/dL   CBC (AM Draw)   Result Value Ref Range    WBC 13.0 (H) 4.0 - 11.0 10e9/L    RBC Count 2.43 (L) 4.4 - 5.9 10e12/L    Hemoglobin 7.8 (L) 13.3 - 17.7 g/dL    Hematocrit 23.0 (L) 40.0 - 53.0 %    MCV 95 78 - 100 fl    MCH 32.1 26.5 - 33.0 pg    MCHC 33.9 31.5 - 36.5 g/dL    RDW 16.7 (H) 10.0 - 15.0 %    Platelet Count 203 150 - 450 10e9/L

## 2020-05-22 ENCOUNTER — DOCUMENTATION ONLY (OUTPATIENT)
Dept: CARDIOLOGY | Facility: CLINIC | Age: 75
End: 2020-05-22

## 2020-05-22 ENCOUNTER — APPOINTMENT (OUTPATIENT)
Dept: OCCUPATIONAL THERAPY | Facility: CLINIC | Age: 75
DRG: 246 | End: 2020-05-22
Payer: MEDICARE

## 2020-05-22 LAB
ANION GAP SERPL CALCULATED.3IONS-SCNC: 5 MMOL/L (ref 3–14)
BUN SERPL-MCNC: 6 MG/DL (ref 7–30)
CALCIUM SERPL-MCNC: 8 MG/DL (ref 8.5–10.1)
CHLORIDE SERPL-SCNC: 109 MMOL/L (ref 94–109)
CO2 SERPL-SCNC: 26 MMOL/L (ref 20–32)
CREAT SERPL-MCNC: 0.66 MG/DL (ref 0.66–1.25)
ERYTHROCYTE [DISTWIDTH] IN BLOOD BY AUTOMATED COUNT: 16.8 % (ref 10–15)
GFR SERPL CREATININE-BSD FRML MDRD: >90 ML/MIN/{1.73_M2}
GLUCOSE SERPL-MCNC: 87 MG/DL (ref 70–99)
HCT VFR BLD AUTO: 29.3 % (ref 40–53)
HGB BLD-MCNC: 9.7 G/DL (ref 13.3–17.7)
LACTATE BLD-SCNC: 1.6 MMOL/L (ref 0.7–2)
MCH RBC QN AUTO: 30.9 PG (ref 26.5–33)
MCHC RBC AUTO-ENTMCNC: 33.1 G/DL (ref 31.5–36.5)
MCV RBC AUTO: 93 FL (ref 78–100)
PLATELET # BLD AUTO: 224 10E9/L (ref 150–450)
POTASSIUM SERPL-SCNC: 3 MMOL/L (ref 3.4–5.3)
RBC # BLD AUTO: 3.14 10E12/L (ref 4.4–5.9)
SODIUM SERPL-SCNC: 140 MMOL/L (ref 133–144)
WBC # BLD AUTO: 11.5 10E9/L (ref 4–11)

## 2020-05-22 PROCEDURE — 99207 ZZC CONSULT E&M CHANGED TO INITIAL LEVEL: CPT | Performed by: PHYSICIAN ASSISTANT

## 2020-05-22 PROCEDURE — 99232 SBSQ HOSP IP/OBS MODERATE 35: CPT | Performed by: INTERNAL MEDICINE

## 2020-05-22 PROCEDURE — 40000893 ZZH STATISTIC PT IP EVAL DEFER: Performed by: PHYSICAL THERAPIST

## 2020-05-22 PROCEDURE — 21000001 ZZH R&B HEART CARE

## 2020-05-22 PROCEDURE — 83605 ASSAY OF LACTIC ACID: CPT | Performed by: INTERNAL MEDICINE

## 2020-05-22 PROCEDURE — 85027 COMPLETE CBC AUTOMATED: CPT | Performed by: INTERNAL MEDICINE

## 2020-05-22 PROCEDURE — 97110 THERAPEUTIC EXERCISES: CPT | Mod: GO

## 2020-05-22 PROCEDURE — 25000132 ZZH RX MED GY IP 250 OP 250 PS 637: Mod: GY | Performed by: NURSE PRACTITIONER

## 2020-05-22 PROCEDURE — 25000128 H RX IP 250 OP 636: Performed by: INTERNAL MEDICINE

## 2020-05-22 PROCEDURE — 25000132 ZZH RX MED GY IP 250 OP 250 PS 637: Mod: GY | Performed by: INTERNAL MEDICINE

## 2020-05-22 PROCEDURE — 80048 BASIC METABOLIC PNL TOTAL CA: CPT | Performed by: INTERNAL MEDICINE

## 2020-05-22 PROCEDURE — 99233 SBSQ HOSP IP/OBS HIGH 50: CPT | Performed by: INTERNAL MEDICINE

## 2020-05-22 PROCEDURE — 99222 1ST HOSP IP/OBS MODERATE 55: CPT | Performed by: PHYSICIAN ASSISTANT

## 2020-05-22 PROCEDURE — 25800030 ZZH RX IP 258 OP 636: Performed by: INTERNAL MEDICINE

## 2020-05-22 RX ORDER — DOCUSATE SODIUM 100 MG/1
100 CAPSULE, LIQUID FILLED ORAL 2 TIMES DAILY PRN
Status: DISCONTINUED | OUTPATIENT
Start: 2020-05-22 | End: 2020-06-04 | Stop reason: HOSPADM

## 2020-05-22 RX ORDER — LOPERAMIDE HCL 2 MG
2 CAPSULE ORAL 4 TIMES DAILY PRN
Status: DISCONTINUED | OUTPATIENT
Start: 2020-05-22 | End: 2020-06-04 | Stop reason: HOSPADM

## 2020-05-22 RX ORDER — DIGOXIN 125 MCG
125 TABLET ORAL DAILY
Status: DISCONTINUED | OUTPATIENT
Start: 2020-05-23 | End: 2020-05-31

## 2020-05-22 RX ADMIN — SODIUM CHLORIDE, POTASSIUM CHLORIDE, SODIUM LACTATE AND CALCIUM CHLORIDE: 600; 310; 30; 20 INJECTION, SOLUTION INTRAVENOUS at 11:33

## 2020-05-22 RX ADMIN — ATORVASTATIN CALCIUM 40 MG: 40 TABLET, FILM COATED ORAL at 08:17

## 2020-05-22 RX ADMIN — ASPIRIN 81 MG: 81 TABLET, DELAYED RELEASE ORAL at 08:17

## 2020-05-22 RX ADMIN — CLOPIDOGREL BISULFATE 75 MG: 75 TABLET, FILM COATED ORAL at 08:17

## 2020-05-22 RX ADMIN — DIGOXIN 125 MCG: 0.25 INJECTION INTRAMUSCULAR; INTRAVENOUS at 08:17

## 2020-05-22 RX ADMIN — POTASSIUM CHLORIDE 40 MEQ: 1500 TABLET, EXTENDED RELEASE ORAL at 06:38

## 2020-05-22 RX ADMIN — SODIUM CHLORIDE, POTASSIUM CHLORIDE, SODIUM LACTATE AND CALCIUM CHLORIDE: 600; 310; 30; 20 INJECTION, SOLUTION INTRAVENOUS at 20:12

## 2020-05-22 RX ADMIN — SODIUM CHLORIDE, POTASSIUM CHLORIDE, SODIUM LACTATE AND CALCIUM CHLORIDE: 600; 310; 30; 20 INJECTION, SOLUTION INTRAVENOUS at 03:21

## 2020-05-22 RX ADMIN — GABAPENTIN 600 MG: 300 CAPSULE ORAL at 20:05

## 2020-05-22 ASSESSMENT — ACTIVITIES OF DAILY LIVING (ADL)
ADLS_ACUITY_SCORE: 22

## 2020-05-22 ASSESSMENT — MIFFLIN-ST. JEOR: SCORE: 1490.63

## 2020-05-22 NOTE — PROGRESS NOTES
Report called to CCU. Patient VSS. On room air. Transferred in wheelchair with RN & NA . Belongings reviewed with patient: cell phone, , glasses, wallet, clothing.     Transferred without complication.

## 2020-05-22 NOTE — PLAN OF CARE
Discharge Planner PT   Patient plan for discharge: unknown  Current status: PT: Order received; Per chart review and conversation with OT, patient having poor tolerance for activity with 's to 170's with limited activity; OT will plan to carry out rehab services in hospital; will plan to defer PT to next level of care. Will complete order  Barriers to return to prior living situation: defer to OT  Recommendations for discharge: defer to OT  Rationale for recommendations: At this time we will defer PT to next level of care given patient's poor tolerance for activity. Would likely benefit from both services when more medically stable and has improved activity tolerance.       Entered by: Марина Lawrence 05/22/2020 3:53 PM

## 2020-05-22 NOTE — CONSULTS
[facility]  Consult Note - Hospitalist Service     Date of Admission:  5/18/2020  Consult Requested by: ICU service  Reason for Consult: assume care    Assessment & Plan   Efrem Ramos is a 74 year old male with a past medical history of obstructive sleep apnea, hyperlipidemia, CVA, A. fib on chronic anticoagulation with Eliquis, coronary artery disease, mitral stenosis, and severe aortic stenosis who was admitted on 5/18/2020 following complications after an elective heart catheterization with stent placement and rotational artherectomy of RCA in anticipation of TAVR.  Postprocedure, patient was identified to have persistent cath site bleeding with development of massive hematoma and associated hemorrhagic shock.  Patient was identified to have active extravasation from the left common femoral artery. Hemostasis was achieved with ultrasound-guided direct pressure. He was resuscitated and admitted to the ICU with ongoing cares.  He has since been weaned from pressor support for greater than 24 hours, hospitalist service was contacted to assume medical care.    Massive hematoma, left groin/scrotum  Hemorrhagic shock, resolved  ABL anemia  Postprocedure, developed acute hypotension with rapid development of groin/scrotal hematoma. In setting of chronic AC with Eliquis for afib, had been held for 3d in anticipation of procedure. Required initiation of transfusion protocol, pressor support. CTA A/P revealed active extravasation from left common femoral artery into left groin with extensive hemorrhage. Vascular surgery was urgently consulted, femstop repositioned and hemostasis achieved with direct ultrasound-guided pressure. Duplex ultrasound performed after direct pressure indicated resolution of active extravasation. Pt received a total of 4u PRBCs, last transfusion 5/21 for Hgb 7.8 and persistent hypotension. Has been weaned from pressors for >24h. Hgb this AM 9.7.  - Daily Hgb  - Transfuse PRN  Hgb < 7  - Holding PTA Eliquis given above, anticipate resuming in 1-2 weeks when bleeding risk decreased  - Remains on DAPT with ASA/Plavix given recent PCI  - Urology consulted given scrotal hematoma, scrotal support & elevation, PVRs have been wnl, signed off    Chronic Afib with RVR  Hx afib on chronic AC with Eliquis. PTA maintained on metoprolol for rate control. Developed episodic RVR. Transitioned to digoxin temporarily for rate control.  - Continues on digoxin, holding PTA metoprolol, resume per Cardiology  - Holding PTA Eliquis as above    CAD s/p PCIx4 to RCA (5/18/2020)  Hypertension  Hyperlipidemia  Pt with identified RCA disease on initial angiogram for TAVR workup performed 3/23/20, however due to uncertainty on repair approach (surgical v percutaneous) of aortic valve, RCA was not intervened upon at that time. CT TAVR was subsequently performed and indicated favorable anatomy for TAVR. Subsequently underwent angiogram 5/18/2020 with complex PCI, adjunctive rotational arthrectomy with DESx4 from proximal to distal RCA.  - Continues on DAPT with ASA/Plavix given recent stent  - Holding PTA metoprolol due to soft blood pressures, resume per Cardiology    Severe Aortic Stenosis  Undergoing workup for eventual outpatient TAVR.  - Cardiology following    MEL  - CPAP with home settings    The patient's care was discussed with the Attending Physician, Dr. Reyes, Bedside Nurse and Patient.    Franklin Christensen PA-C  [facility]    ______________________________________________________________________    History of Present Illness   Efrem Ramos is a 74 year old male with a past medical history of obstructive sleep apnea, hyperlipidemia, CVA, A. fib on chronic anticoagulation with Eliquis, coronary artery disease, mitral stenosis, and severe aortic stenosis who was admitted on 5/18/2020 following complications after an elective heart catheterization with stent placement and rotational  artherectomy of RCA in anticipation of TAVR.  Postprocedure, patient was identified to have persistent cath site bleeding with development of massive hematoma and associated hemorrhagic shock.  Patient was identified to have active extravasation from the left common femoral artery. Hemostasis was achieved with ultrasound-guided direct pressure. He was resuscitated and admitted to the ICU with ongoing cares.  He has since been weaned from pressor support for greater than 24 hours, hospitalist service was contacted to assume medical care.    Patient is presently evaluated in hospital room. He is sleeping on interview, wakes easily to voice. Denies pain, difficulty breathing. RN at bedside without issues presently. Blood pressures have been soft overnight and this AM, pt is tolerating well. Continuing to hold PTA metoprolol.    Review of Systems   The 10 point Review of Systems is negative other than noted in the HPI or here.     Past Medical History    I have reviewed this patient's medical history and updated it with pertinent information if needed.   Past Medical History:   Diagnosis Date     Atrial fibrillation (H)      Benign essential hypertension 11/20/2018     Cancer (H) vocal cord     Carpal tunnel syndrome     abstracted 7/3/02.     CVA (cerebral infarction) 5/5/2015     Demyelinating disease of central nervous system, unspecified (H)     abstracted 7/3/02.     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     Pneumonia 8/17/2016     PVD (peripheral vascular disease) (H)      Redundant  colon     needs CT colonography     S/P coronary angiogram 09/05/2017     Shingles      SKIN DISORDERS NEC 3/15/2005     Sleep apnea        Past Surgical History   I have reviewed this patient's surgical history and updated it with pertinent information if needed.  Past Surgical History:   Procedure Laterality Date     BIOPSY  brain 2002     BONE MARROW BIOPSY, BONE SPECIMEN, NEEDLE/TROCAR N/A 6/8/2017    Procedure: BIOPSY BONE MARROW;  UNILATERAL BONE MARROW BIOPSY (CONSCIOUS SEDATION) ;  Surgeon: Jamie Gonzales MD;  Location:  GI     C NONSPECIFIC PROCEDURE  as a child    T & A. abstracted 7/3/02.     C NONSPECIFIC PROCEDURE  early    CTR. abstracted 7/3/02.     CARDIAC CATHERIZATION  09/05/2017    2V CAD, IFR of RCA 0.95     CV CORONARY ANGIOGRAM N/A 3/23/2020    Procedure: Coronary Angiogram and right heart cath;  Surgeon: Gino Ferrer MD;  Location: [facility] CARDIAC CATH LAB     CV HEART CATHETERIZATION WITH POSSIBLE INTERVENTION N/A 5/18/2020    Procedure: Heart Catheterization with Possible Intervention;  Surgeon: Gaurang Swenson MD;  Location: [facility] CARDIAC CATH LAB     CV INSTANTANEOUS WAVE-FREE RATIO N/A 3/23/2020    Procedure: Instantaneous Wave-Free Ratio;  Surgeon: Gino Ferrer MD;  Location: [facility] CARDIAC CATH LAB     CV PCI ATHERECTOMY ORBITAL N/A 5/18/2020    Procedure: Percutaneous Coronary Intervention Atherectomy Rotational;  Surgeon: Gaurnag Swenson MD;  Location: [facility] CARDIAC CATH LAB     CV PCI STENT DRUG ELUTING N/A 5/18/2020    Procedure: Percutaneous Coronary Intervention Stent Drug Eluting;  Surgeon: Gaurang Swenson MD;  Location: [facility] CARDIAC CATH LAB     CV TEMPORARY PACEMAKER INSERTION N/A 5/18/2020    Procedure: Temporary Pacemaker Insertion;  Surgeon: Gaurang Swenson MD;  Location: [facility] CARDIAC CATH LAB     ESOPHAGOSCOPY, GASTROSCOPY, DUODENOSCOPY (EGD), COMBINED N/A 9/8/2018    Procedure: COMBINED  ESOPHAGOSCOPY, GASTROSCOPY, DUODENOSCOPY (EGD), BIOPSY SINGLE OR MULTIPLE;;  Surgeon: Xander Marie MD;  Location:  GI     HEAD & NECK SURGERY       ORTHOPEDIC SURGERY      right arm ulna reset after injury     THORACOSCOPIC WEDGE RESECTION LUNG Right 2016    Procedure: THORACOSCOPIC WEDGE RESECTION LUNG;  Surgeon: Abdelrahman Noriega MD;  Location:  OR       Social History   I have reviewed this patient's social history and updated it with pertinent information if needed.  Social History     Tobacco Use     Smoking status: Former Smoker     Packs/day: 1.00     Years: 30.00     Pack years: 30.00     Types: Cigarettes     Last attempt to quit: 2013     Years since quittin.8     Smokeless tobacco: Never Used   Substance Use Topics     Alcohol use: Yes     Alcohol/week: 42.0 standard drinks     Types: 42 Standard drinks or equivalent per week     Comment: occ     Drug use: No       Family History   I have reviewed this patient's family history and updated it with pertinent information if needed.   Family History   Problem Relation Age of Onset     Blood Disease Mother         Anemia     Cardiovascular Father      Cancer - colorectal Maternal Grandfather      Hypertension Brother      Diabetes Sister 5        Juvinile Diabetes passed at 36     Respiratory Other         Lung Cancer       Medications   Medications Prior to Admission   Medication Sig Dispense Refill Last Dose     acetaminophen (TYLENOL) 325 MG tablet Take 325-650 mg by mouth every 6 hours as needed for mild pain   Past Month at Unknown time     apixaban ANTICOAGULANT (ELIQUIS) 5 MG tablet Take 1 tablet (5 mg) by mouth 2 times daily 180 tablet 3 2020 at Unknown time     ASPIRIN PO Take 81 mg by mouth every evening    2020 at Unknown time     atorvastatin (LIPITOR) 40 MG tablet Take 1 tablet (40 mg) by mouth daily 90 tablet 3 2020 at Unknown time     calcium carbonate 600 mg-vitamin D 400 units (CALTRATE)  600-400 MG-UNIT per tablet Take 1 tablet by mouth 2 times daily   5/17/2020 at Unknown time     furosemide (LASIX) 20 MG tablet Take 1 tablet (20 mg) by mouth daily 90 tablet 0 Past Week at Unknown time     gabapentin (NEURONTIN) 300 MG capsule Take 2 capsules (600 mg) by mouth every evening 60 capsule 5 5/17/2020 at Unknown time     melatonin 1 MG TABS tablet Take 1-3 mg by mouth nightly as needed for sleep   5/17/2020 at Unknown time     metoprolol succinate ER (TOPROL-XL) 25 MG 24 hr tablet Take 37.5 mg by mouth 1/2 tablet in am , and 1 tablet in pm   5/17/2020 at Unknown time     multivitamin (OCUVITE) TABS Take 1 tablet by mouth 2 times daily    5/17/2020 at Unknown time     potassium chloride ER 20 MEQ PO CR tablet Take 1 tablet (20 mEq) by mouth daily 90 tablet 3 5/17/2020 at Unknown time     Cyanocobalamin 2000 MCG TABS Take 2,000 mcg by mouth every 7 days On Sundays   Unknown at Unknown time     isosorbide mononitrate (IMDUR) 30 MG 24 hr tablet Take 0.5 tablets (15 mg) by mouth daily 30 tablet 1 not started yet     loperamide (IMODIUM) 2 MG capsule Take 2 mg by mouth 4 times daily as needed for diarrhea   Unknown at Unknown time     polyethylene glycol (MIRALAX/GLYCOLAX) powder Take 1 capful by mouth daily as needed for constipation   Unknown at Unknown time     valACYclovir (VALTREX) 1000 mg tablet TAKE 1 TABLET(1000 MG) BY MOUTH THREE TIMES DAILY FOR 7 DAYS 21 tablet 0        Allergies   Allergies   Allergen Reactions     Sulfa Drugs Difficulty breathing     Adhesive Tape Blisters     Amiodarone Other (See Comments)     Developed pleural effusion     Penicillins Unknown     Reaction occurred as a child       Physical Exam   Vital Signs: Temp: 97.7  F (36.5  C) Temp src: Oral BP: (!) 108/96 Pulse: 129 Heart Rate: 124 Resp: 17 SpO2: 95 % O2 Device: None (Room air)    Weight: 176 lbs 12.94 oz    GEN: well-developed, appears comfortable  HEENT: NCAT, PERRL, EOM intact bilaterally, sclera clear, conjunctiva  normal, nose & mouth patent, mucous membranes moist  CHEST: lungs CTA bilaterally, no increased work of breathing, no wheeze, rales, rhonchi  HEART: tachycardic, irregularly irregular, S1 & S2, +systolic murmur along left sternal border  ABD: soft, nontender, nondistended, no guarding or rigidity, +BS in all 4 quadrants  : scrotum edematous with ecchymosis in circumferential pattern, ecchymosis extending into left groin; no warmth, open wound, drainage  MSK: full AROM bilateral UE/LE, pedal & radial pulses 2+ bilaterally  NEURO: awake, alert, oriented to name, place, and time. CN II-XII grossly intact.   SKIN: warm & dry without rash, no pedal edema      Data   Results for orders placed or performed during the hospital encounter of 05/18/20 (from the past 24 hour(s))   US Lower Extremity Arterial Duplex Left    Narrative    ULTRASOUND LEFT LOWER EXTREMITY ARTERIAL DUPLEX May 21, 2020 12:15 PM     HISTORY: 74-year-old with worsening left groin hematoma.    COMPARISON: Ultrasound dated 5/19/2020.    FINDINGS: Color Doppler and spectral waveform analysis performed.    The left external iliac artery, common femoral artery, proximal  superficial femoral and proximal profunda femoris arteries are patent.  No evidence for pseudoaneurysm. Again noted is a probable hematoma in  the left inguinal region that measures 4.2 x 5.7 x 2.2 cm. This is not  significantly changed when compared to the previous exam.       Impression    IMPRESSION: No definite evidence for pseudoaneurysm in the left  inguinal region.    OMAIRA WILKINS MD   Basic metabolic panel   Result Value Ref Range    Sodium 140 133 - 144 mmol/L    Potassium 3.0 (L) 3.4 - 5.3 mmol/L    Chloride 109 94 - 109 mmol/L    Carbon Dioxide 26 20 - 32 mmol/L    Anion Gap 5 3 - 14 mmol/L    Glucose 87 70 - 99 mg/dL    Urea Nitrogen 6 (L) 7 - 30 mg/dL    Creatinine 0.66 0.66 - 1.25 mg/dL    GFR Estimate >90 >60 mL/min/[1.73_m2]    GFR Estimate If Black >90 >60  mL/min/[1.73_m2]    Calcium 8.0 (L) 8.5 - 10.1 mg/dL   CBC (AM Draw)   Result Value Ref Range    WBC 11.5 (H) 4.0 - 11.0 10e9/L    RBC Count 3.14 (L) 4.4 - 5.9 10e12/L    Hemoglobin 9.7 (L) 13.3 - 17.7 g/dL    Hematocrit 29.3 (L) 40.0 - 53.0 %    MCV 93 78 - 100 fl    MCH 30.9 26.5 - 33.0 pg    MCHC 33.1 31.5 - 36.5 g/dL    RDW 16.8 (H) 10.0 - 15.0 %    Platelet Count 224 150 - 450 10e9/L

## 2020-05-22 NOTE — PROGRESS NOTES
Critical Care Progress Note  05/22/2020    Name: Efrem Ramos MRN#: [id number]   Age: 74 year old YOB: 1945     [facility]           Problem List:   Principal Problem:    Coronary artery disease involving native coronary artery of native heart without angina pectoris  Active Problems:    CAD (coronary artery disease)-moderate  nonobstructive 2 vessel    Status post coronary angiogram    Bleeding         Summary/Hospital Course:   74 year old male with history of MEL, recent shingles, PVD, HLD, CVA 2015, A fib, severe aortic stenosis, and mild to moderate mitral stenosis admitted 5/18/2020 for elective RCA catheterization with stent placement in anticipation of TAVR. Post procedure, he developed persistent cath site bleeding and massive hematoma. He was also hypotensive, and was transferred to the ICU. He was transfused 3 units PRBCs. CT contrast was done, which found active extravasation in the common iliac vs proximal common femoral. Femstop was repositioned by vascular surgery. He had persistent pressor needs after ICU admission, and central line was placed on 5/19 in the early morning. He was weaned off pressors on 5/21 after receiving 1 unit PRBC.     Assessment and plan :     Efrem Ramos is a 74 year old male admitted on 5/18/2020 for RCA catheterization and stent placement in anticipation of TAVR, with course complicated by post-op bleeding/hematoma and shock state requiring pressors, in the setting of a history of MEL, recent shingles, PVD, HLD, CVA 2015, A fib, severe aortic stenosis, and mild to moderate mitral stenosis.  I have personally reviewed the daily labs, imaging studies, cultures and discussed the case with referring physician and consulting physicians.   My assessment and plan by system for this patient is as follows:    Neurology/Psychiatry:   1. Analgesia  Plan  -acetaminophen 650 mg q4h PRN  -gabapentin 600 mg nightly     Cardiovascular:   1. Single vessel CAD (RCA) s/p  4 stents on 5/18, complicated by cath site bleeding/hematoma  2. Severe Aortic stenosis  3. Mild-moderate mitral stenosis  4. Atrial fibrillation with RVR  5. Shock state, acute blood loss vs undifferentiated vs vasoplegia, improved with PRBC transfusion.   Plan  -appreciate cardiology assistance  -Follow serial hemoglobin daily   -holding eliquis given bleeding, with plan to restart eliquis and stop aspirin when bleeding risk subsides (1-2 weeks likely)   -Holding home metoprolol given ongoing low BPs, hopefully will be able to restart soon.   -digoxin 125 mcg daily while off metoprolol.   -TAVR to hopefully be done as outpatient after patient is fully recovered.   -asa 81 mg daily (planning to stop this when eliquis restarted)   -clopidogrel 75 mg daily   -atorvastatin 40 mg dialy     Pulmonary/Ventilator Management:   1. Acute hypoxic respiratory insufficiency, resolved  2. 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 Emphysema on imaging: not on inhalers as outpatient  Plan  -albuterol PRN    GI/Nutrition :   1.  Nutrition:  2. Loose stools.   Plan  -regular diet  -docusate 100 mg BID PRN  -Imodium PRN    Renal/Fluids/Electrolytes/:  1. Penile and scrotal edema and hematoma: slow improvement   2. Hypotension, possible hypovolemia.   Plan  - monitor function and electrolytes as needed with replacement per ICU protocols.  - generally avoid nephrotoxic agents such as NSAID, IV contrast unless specifically required  - adjust medications as needed for renal clearance  - follow I/O's as appropriate.  - LR at 125 mL/hr until PO intake improves  - may need additional 1 unit PRBC if BP remains marginal.     Infectious Disease:    1. No acute issues  Plan:  -monitor    Endocrine:   1. No acute issues  Plan  - ICU insulin protocol, goal sugar <180    Hematology/Oncology:   1.  Acute blood loss anemia with hemodynamic instability   Plan  -Hgb goal >7, transfuse if needed. May  need to transfuse to higher goal if BP remains marginal  -follow Hgb daily   -Holding eliquis with bleeding as above  -Dual antiplatelet therapy with aspirin and plavix for recent heart stents (placed 5/18)    MSK/Rheum:  1. No acute issues  Plan:  -monitor    IV/Access:   1. Venous access - right internal jugular CVC, can remove  2. Arterial access - none  3.  Plan  - central access can be discontinued     ICU Prophylaxis:   1. DVT: mechanical  2. VAP: Not indicated  3. Stress Ulcer: Not indicated  4. Restraints: Not indicated  5. Wound care - per unit routine   6. Feeding - regular diet  7. Family Update: not yet done  8. Disposition - stable to transfer to monitored bed    Key goals for next 24 hours:   1. Transfer out of ICU  2. Monitor BP, may need additional unit PRBC      Interim History/Overnight Events:   Transfused 1 unit PRBC last evening. Off of pressors. Remains tachycardic at times. HR controlled at rest, but increases with activity. Continues to have some lower abdominal pain/scrotal pain. Mild improvement in scrotal swelling. No chest pain, no shortness of breath. Occasional lightheadedness with movement. Had loose BM this AM (this apparently happens at home too). Did eat about half of breakfast this AM.        Key Medications:       aspirin  81 mg Oral Daily     atorvastatin  40 mg Oral Daily     clopidogrel  75 mg Oral Daily     digoxin  125 mcg Intravenous Daily     docusate sodium  100 mg Oral BID     gabapentin  600 mg Oral QPM     sodium chloride (PF)  3 mL Intracatheter Q8H       - MEDICATION INSTRUCTIONS -       - MEDICATION INSTRUCTIONS -       - MEDICATION INSTRUCTIONS -       - MEDICATION INSTRUCTIONS -       lactated ringers 125 mL/hr at 05/22/20 0321     norepinephrine Stopped (05/22/20 0040)     Percutaneous Coronary Intervention orders placed (this is information for BPA alerting)       Percutaneous Coronary Intervention orders placed (this is information for BPA alerting)        ACE/ARB/ARNI NOT PRESCRIBED            Physical Examination:   Temp:  [97.7  F (36.5  C)-98.7  F (37.1  C)] 97.7  F (36.5  C)  Pulse:  [] 96  Heart Rate:  [] 92  Resp:  [9-36] 19  BP: ()/() 97/67  SpO2:  [88 %-99 %] 96 %    Intake/Output Summary (Last 24 hours) at 5/19/2020 0750  Last data filed at 5/19/2020 0600  Gross per 24 hour   Intake 2819.91 ml   Output 500 ml   Net 2319.91 ml     Wt Readings from Last 4 Encounters:   05/22/20 80.2 kg (176 lb 12.9 oz)   05/13/20 73.9 kg (163 lb)   04/17/20 71.7 kg (158 lb)   03/23/20 74.9 kg (165 lb 3.2 oz)     BP - Mean:  [] 78  Resp: 19    No lab results found in last 7 days.    GEN: no acute distress, lying in bed    HEENT: head ncat, sclera anicteric, trachea midline   PULM: unlabored breathing. CTAB.   CV/COR: irregularly irregular and tachycardic, soft systolic murmur.    ABD: soft. TTP in lower abdomen bilaterally which is somewhat tense from hematoma. Hypoactive bowel sounds.   MSK/EXT:  Bruising at L iliac crest, with extension to left lower back, mild on right. TTP. No LE edema noted  NEURO: grossly intact. Speech/language wnl.   SKIN: no obvious rash. Bruising in lower abdomen as well as scrotum and penis   : Improvement in scrotal and penile edema/hematoma.   LINES: clean, dry intact         Data:   All data and imaging reviewed.     ROUTINE ICU LABS (Last four results)  CMP  Recent Labs   Lab 05/22/20  0540 05/21/20  0530 05/20/20  0415 05/19/20  0850 05/19/20  0506 05/18/20 2025    140 140 141  --  141   POTASSIUM 3.0* 3.6 3.6 4.9  --  4.3   CHLORIDE 109 110* 111* 113*  --  111*   CO2 26 26 22 19*  --  20   ANIONGAP 5 4 7 9  --  10   GLC 87 94 93 115*  --  155*   BUN 6* 7 12 13  --  8   CR 0.66 0.67 0.79 1.08  --  0.86   GFRESTIMATED >90 >90 87 66  --  84   GFRESTBLACK >90 >90 >90 76  --  >90   ULISSES 8.0* 8.0* 7.8* 8.4*  --  8.8   MAG  --   --   --   --  2.4* 1.8   PHOS  --   --   --   --  4.6*  --    ALBUMIN  --  2.2*  --    --   --   --      CBC  Recent Labs   Lab 05/22/20  0540 05/21/20  0545 05/20/20  0415 05/19/20  1736  05/18/20 2025   WBC 11.5* 13.0* 17.9*  --   --  21.4*   RBC 3.14* 2.43* 2.61*  --   --  3.80*   HGB 9.7* 7.8* 8.5* 9.0*   < > 12.1*   HCT 29.3* 23.0* 24.5*  --   --  36.7*   MCV 93 95 94  --   --  97   MCH 30.9 32.1 32.6  --   --  31.8   MCHC 33.1 33.9 34.7  --   --  33.0   RDW 16.8* 16.7* 17.1*  --   --  16.8*    203 209  --   --  238    < > = values in this interval not displayed.     INR  Recent Labs   Lab 05/18/20 2025 05/18/20  0831   INR 1.51* 1.18*     Arterial Blood GasNo lab results found in last 7 days.    All cultures:  No results for input(s): CULT in the last 168 hours.  Recent Results (from the past 24 hour(s))   US Lower Extremity Arterial Duplex Left    Narrative    ULTRASOUND LEFT LOWER EXTREMITY ARTERIAL DUPLEX May 21, 2020 12:15 PM     HISTORY: 74-year-old with worsening left groin hematoma.    COMPARISON: Ultrasound dated 5/19/2020.    FINDINGS: Color Doppler and spectral waveform analysis performed.    The left external iliac artery, common femoral artery, proximal  superficial femoral and proximal profunda femoris arteries are patent.  No evidence for pseudoaneurysm. Again noted is a probable hematoma in  the left inguinal region that measures 4.2 x 5.7 x 2.2 cm. This is not  significantly changed when compared to the previous exam.       Impression    IMPRESSION: No definite evidence for pseudoaneurysm in the left  inguinal region.    OMAIRA WILKINS MD                 Billing: This patient is critically ill: No. Care time today is 35 minutes, with >50% of time in counseling/coordination of care.     Gerardo Cronin MD    Department of Medicine, Division of Pulmonary, Allergy, Critical Care, and Sleep Medicine  Pager: [phone]

## 2020-05-22 NOTE — PLAN OF CARE
The patient remains alert and oriented. He wanted to sleep tonight.   Levophed weaned off. Patient still borderline hypotensive. MAP>65 most of the night.   A-fib on monitor. Mostly rate controlled. HR 120s with getting up to commode this morning.   Room air. Lung sounds clear.   Groin site still ecchymotic. Soft to palpation.   1 unit of blood was started on previous shift and finished on this shift. Hgb stable with AM labs.   Potassium replaced this AM.   Patient urinal voiding.   Junior Pack RN 7:02 AM 05/22/20

## 2020-05-22 NOTE — PLAN OF CARE
Discharge Planner OT   Patient plan for discharge: Home      Current status: Supine > sit with min A. Initially Malinda for sitting balance and progressed to SBA. Pt uncomfortable due to scrotal edema. CGA for sit to stand with FWW. Engaged in standing exercises for 2 consecutive minutes. Stand to sit with CGA. Rest break. Sit to stand again with CGA. Ambulated 4 ft to bedside chair with CGA using FWW. HR briefly spiked to 140s otherwise was in 110-125 range with all activity. RN reported he spoke to MD regarding HR and received approval for therapy participation. BP decreased with EOB activity; refer to VS flowsheet. Maintained O2 sat on RA.     Barriers to return to prior living situation: Current level of A for I/ADLs, stairs      Recommendations for discharge: TCU     Rationale for recommendations: Pt would benefit from continued skilled OT services to improve independence and safety with ADL's and functional transfers as pt is not at baseline. Pending progress and LOS, pt may be safe to return home with spouse A. Will continue to update recs.         Entered by: Pham Hendricks 05/22/2020 3:11 PM

## 2020-05-22 NOTE — PROGRESS NOTES
Patient reviewed with Dr. Cortes and Dr. Swenson. Pt's TAVR will be delayed until recovery from current admission, at least one month after PCI per Dr. Swenson. Alternative access will be pursued. Will follow along chart.  Bella Dunn RN  Structural Heart Care Coordinator

## 2020-05-22 NOTE — PROGRESS NOTES
[facility]    Urology Progress Note     Assessment & Plan   Efrem Ramos is a 74 year old male who was admitted on 5/18/2020. Urology following for penoscrotal edema and hematoma following cardiac cath on 5/18-- stable.     Plan:   -cont scrotal support and elevation   -PVR this am 25cc, cont intermittent checks to ensure emptying well     Urology will sign off for now.   Please call with any questions or concerns.     Ave Madrid PA-C  [facility]   [email or web address]  Text Page (7:30am to 4:30pm)    Interval History   No events overnight   Edema seems to be slightly improved this AM   Voiding well, small amounts; PVR this AM 25cc    AVSS    Physical Exam   Temp: 97.7  F (36.5  C) Temp src: Oral BP: (!) 108/96 Pulse: 129 Heart Rate: 124 Resp: 17 SpO2: 95 % O2 Device: None (Room air)    Vitals:    05/20/20 0400 05/21/20 0600 05/22/20 0600   Weight: 78.7 kg (173 lb 8 oz) 81 kg (178 lb 9.2 oz) 80.2 kg (176 lb 12.9 oz)     Vital Signs with Ranges  Temp:  [97.7  F (36.5  C)-98.7  F (37.1  C)] 97.7  F (36.5  C)  Pulse:  [] 129  Heart Rate:  [] 124  Resp:  [0-36] 17  BP: ()/() 108/96  SpO2:  [88 %-99 %] 95 %  I/O last 3 completed shifts:  In: 3669.7 [I.V.:3049.7]  Out: 2035 [Urine:2035]    Constitutional: Sitting up in chair, NAD  Eyes: no icterus  ENT: normocephalic   Respiratory: breathing unlabored   Cardiovascular: chest wall symmetric   GI: abd soft, NT, ND  Lymph/Hematologic: no le edema   Genitourinary: significant penoscrotal edema and ecchymosis present; mildly tender to palpation   Skin: well perfused   Musculoskeletal: moves all extremities   Neurologic: no focal deficits   Neuropsychiatric: A&Ox3    Medications     - MEDICATION INSTRUCTIONS -       - MEDICATION INSTRUCTIONS -       - MEDICATION INSTRUCTIONS -       - MEDICATION INSTRUCTIONS -       lactated ringers 125 mL/hr at 05/22/20 0321     norepinephrine Stopped (05/22/20 0040)      Percutaneous Coronary Intervention orders placed (this is information for BPA alerting)       Percutaneous Coronary Intervention orders placed (this is information for BPA alerting)       ACE/ARB/ARNI NOT PRESCRIBED         aspirin  81 mg Oral Daily     atorvastatin  40 mg Oral Daily     clopidogrel  75 mg Oral Daily     [START ON 5/23/2020] digoxin  125 mcg Oral Daily     docusate sodium  100 mg Oral BID     gabapentin  600 mg Oral QPM     sodium chloride (PF)  3 mL Intracatheter Q8H       Data   Results for orders placed or performed during the hospital encounter of 05/18/20 (from the past 24 hour(s))   US Lower Extremity Arterial Duplex Left    Narrative    ULTRASOUND LEFT LOWER EXTREMITY ARTERIAL DUPLEX May 21, 2020 12:15 PM     HISTORY: 74-year-old with worsening left groin hematoma.    COMPARISON: Ultrasound dated 5/19/2020.    FINDINGS: Color Doppler and spectral waveform analysis performed.    The left external iliac artery, common femoral artery, proximal  superficial femoral and proximal profunda femoris arteries are patent.  No evidence for pseudoaneurysm. Again noted is a probable hematoma in  the left inguinal region that measures 4.2 x 5.7 x 2.2 cm. This is not  significantly changed when compared to the previous exam.       Impression    IMPRESSION: No definite evidence for pseudoaneurysm in the left  inguinal region.    OMAIRA WILKINS MD   Basic metabolic panel   Result Value Ref Range    Sodium 140 133 - 144 mmol/L    Potassium 3.0 (L) 3.4 - 5.3 mmol/L    Chloride 109 94 - 109 mmol/L    Carbon Dioxide 26 20 - 32 mmol/L    Anion Gap 5 3 - 14 mmol/L    Glucose 87 70 - 99 mg/dL    Urea Nitrogen 6 (L) 7 - 30 mg/dL    Creatinine 0.66 0.66 - 1.25 mg/dL    GFR Estimate >90 >60 mL/min/[1.73_m2]    GFR Estimate If Black >90 >60 mL/min/[1.73_m2]    Calcium 8.0 (L) 8.5 - 10.1 mg/dL   CBC (AM Draw)   Result Value Ref Range    WBC 11.5 (H) 4.0 - 11.0 10e9/L    RBC Count 3.14 (L) 4.4 - 5.9 10e12/L     Hemoglobin 9.7 (L) 13.3 - 17.7 g/dL    Hematocrit 29.3 (L) 40.0 - 53.0 %    MCV 93 78 - 100 fl    MCH 30.9 26.5 - 33.0 pg    MCHC 33.1 31.5 - 36.5 g/dL    RDW 16.8 (H) 10.0 - 15.0 %    Platelet Count 224 150 - 450 10e9/L

## 2020-05-22 NOTE — PROGRESS NOTES
Assessment and Plan:     Efrem Ramos is a 74 year old male who was admitted on 5/18/2020 after outpatient PCI and stenting of the RCA prior to consideration for TAVR.      1. Postprocedural shock, hemorrhagic  - Complicated by severe aortic stenosis. Ongoing hypotension requiring NorEpi.   - Now weaned off pressors on 5/21/20 after receiving 1 unit PRBC. BP remain soft but stable.   2. Severe aortic stenosis   - Awaiting TAVR as an outpatient after recovery from recent PCI and problem #1.  3. Severe single-vessel right coronary artery disease   - Status post complex PCI with rotational atherectomy and 4 stents to the proximal to distal RCA on 5/18/20.   - Complicated by RFA bleeding into the groin, penis, and scrotum without retroperitoneal bleeding or pseudoaneurysm.   4. Chronic atrial fibrillation   - Anticoagulated on Eliquis PTA with history of stroke.   - Currently rate controlled except during activity. Off Eliquis currently due to bleeding.   - Off PTA metoprolol due to hypotension. Rate controlled with digoxin 125 mcg daily.  5. Peripheral vascular disease  6. Acute blood loss anemia  - Hgb trended down to 7.8 on 5/21/20. Improved today at 9.7 status post transfusion with 1 additional unit of PRBCs on 5/21/20 (Hgb of 12.1 prior to PCI and bleeding).    Plan:  1. Continue with aspirin and plavix.  2. Continue holding Eliquis for now. Plan to restart Eliquis and stop aspirin once bleeding risk has subsided and anemia improved.   4. Continue digoxin for rate control. Continue to hold metoprolol for now given continued soft blood pressures off NorEpi. Will attempt to re-initiate as blood pressures allow.   5. Could likely transfer to [facility] later this afternoon or tomorrow if blood pressures remain stable.   6. TAVR not yet scheduled. He should recover well from this episode prior to TAVR if possible.     Thank you for the opportunity to participate in this very nice patient's care.      Pedro  "KRYSTAL Grimes        Interval History:   Groin remains painful with any movement but tolerable at rest. Currently denies any chest pain or shortness of breath. BP remaining intermittently soft but now stable off pressors.          Medications:       aspirin  81 mg Oral Daily     atorvastatin  40 mg Oral Daily     clopidogrel  75 mg Oral Daily     [START ON 5/23/2020] digoxin  125 mcg Oral Daily     gabapentin  600 mg Oral QPM     sodium chloride (PF)  3 mL Intracatheter Q8H          Physical Exam:   Blood pressure 93/58, pulse 101, temperature 97.7  F (36.5  C), temperature source Oral, resp. rate 27, height 1.702 m (5' 7\"), weight 80.2 kg (176 lb 12.9 oz), SpO2 92 %.    Vitals:    05/18/20 0807 05/19/20 0200 05/20/20 0400 05/21/20 0600   Weight: 74.3 kg (163 lb 12.8 oz) 76.9 kg (169 lb 8.5 oz) 78.7 kg (173 lb 8 oz) 81 kg (178 lb 9.2 oz)    05/22/20 0600   Weight: 80.2 kg (176 lb 12.9 oz)     Intake/Output Summary (Last 24 hours) at 5/21/2020 1144  Last data filed at 5/21/2020 0830  Gross per 24 hour   Intake 2729.3 ml   Output 2550 ml   Net 179.3 ml     05/17 0700 - 05/22 0659  In: 32375.87 [P.O.:660; I.V.:8011.87]  Out: 5830 [Urine:5830]  Net: 5461.87    Physical Exam:  CONSTITUTIONAL: Alert, in no acute distress  HEENT: Normocephalic,atraumatic.   RESP: Lungs clear to auscultation bilaterally.  CV: Irregularly irregular rhythm, controlled rate, with grade 2/6 systolic murmur best heard at the LUSB.  EXTREMITIES: Moves all extremities well and symmetrically. Significant ecchymosis surrounding the right femoral site, penis, and scrotum. Penile and scotal edema noted. No lower extremity edema.  NEURO: No gross focal deficits. Speech clear.        Data:   LABS (Last four results)  CMP  Recent Labs   Lab 05/22/20  0540 05/21/20  0530 05/20/20  0415 05/19/20  0850 05/19/20  0506 05/18/20 2025    140 140 141  --  141   POTASSIUM 3.0* 3.6 3.6 4.9  --  4.3   CHLORIDE 109 110* 111* 113*  --  111*   CO2 26 26 22 19*  -- "  20   ANIONGAP 5 4 7 9  --  10   GLC 87 94 93 115*  --  155*   BUN 6* 7 12 13  --  8   CR 0.66 0.67 0.79 1.08  --  0.86   GFRESTIMATED >90 >90 87 66  --  84   GFRESTBLACK >90 >90 >90 76  --  >90   ULISSES 8.0* 8.0* 7.8* 8.4*  --  8.8   MAG  --   --   --   --  2.4* 1.8   PHOS  --   --   --   --  4.6*  --    ALBUMIN  --  2.2*  --   --   --   --      CBC  Recent Labs   Lab 05/22/20  0540 05/21/20  0545 05/20/20  0415 05/19/20  1736  05/18/20 2025   WBC 11.5* 13.0* 17.9*  --   --  21.4*   RBC 3.14* 2.43* 2.61*  --   --  3.80*   HGB 9.7* 7.8* 8.5* 9.0*   < > 12.1*   HCT 29.3* 23.0* 24.5*  --   --  36.7*   MCV 93 95 94  --   --  97   MCH 30.9 32.1 32.6  --   --  31.8   MCHC 33.1 33.9 34.7  --   --  33.0   RDW 16.8* 16.7* 17.1*  --   --  16.8*    203 209  --   --  238    < > = values in this interval not displayed.     INR  Recent Labs   Lab 05/18/20 2025 05/18/20  0831   INR 1.51* 1.18*     TROPONINS   Lab Results   Component Value Date    TROPI 0.645 () 05/19/2020    TROPI 0.692 () 05/19/2020    TROPI 0.802 () 05/19/2020    TROPI 0.027 09/05/2018    TROPI  07/27/2016     <0.015  The 99th percentile for upper reference range is 0.045 ug/L.  Troponin values in   the range of 0.045 - 0.120 ug/L may be associated with risks of adverse   clinical events.

## 2020-05-23 ENCOUNTER — APPOINTMENT (OUTPATIENT)
Dept: OCCUPATIONAL THERAPY | Facility: CLINIC | Age: 75
DRG: 246 | End: 2020-05-23
Payer: MEDICARE

## 2020-05-23 LAB
ANION GAP SERPL CALCULATED.3IONS-SCNC: 6 MMOL/L (ref 3–14)
BUN SERPL-MCNC: 5 MG/DL (ref 7–30)
CALCIUM SERPL-MCNC: 7.7 MG/DL (ref 8.5–10.1)
CHLORIDE SERPL-SCNC: 110 MMOL/L (ref 94–109)
CO2 SERPL-SCNC: 27 MMOL/L (ref 20–32)
CREAT SERPL-MCNC: 0.65 MG/DL (ref 0.66–1.25)
ERYTHROCYTE [DISTWIDTH] IN BLOOD BY AUTOMATED COUNT: 17 % (ref 10–15)
GFR SERPL CREATININE-BSD FRML MDRD: >90 ML/MIN/{1.73_M2}
GLUCOSE SERPL-MCNC: 79 MG/DL (ref 70–99)
HCT VFR BLD AUTO: 25.9 % (ref 40–53)
HGB BLD-MCNC: 8.6 G/DL (ref 13.3–17.7)
HGB BLD-MCNC: 9.8 G/DL (ref 13.3–17.7)
INTERPRETATION ECG - MUSE: NORMAL
MCH RBC QN AUTO: 31.3 PG (ref 26.5–33)
MCHC RBC AUTO-ENTMCNC: 33.2 G/DL (ref 31.5–36.5)
MCV RBC AUTO: 94 FL (ref 78–100)
PLATELET # BLD AUTO: 219 10E9/L (ref 150–450)
POTASSIUM SERPL-SCNC: 2.9 MMOL/L (ref 3.4–5.3)
POTASSIUM SERPL-SCNC: 3.5 MMOL/L (ref 3.4–5.3)
RBC # BLD AUTO: 2.75 10E12/L (ref 4.4–5.9)
SODIUM SERPL-SCNC: 143 MMOL/L (ref 133–144)
WBC # BLD AUTO: 9.5 10E9/L (ref 4–11)

## 2020-05-23 PROCEDURE — 84132 ASSAY OF SERUM POTASSIUM: CPT | Performed by: INTERNAL MEDICINE

## 2020-05-23 PROCEDURE — 85027 COMPLETE CBC AUTOMATED: CPT | Performed by: INTERNAL MEDICINE

## 2020-05-23 PROCEDURE — 25800030 ZZH RX IP 258 OP 636: Performed by: INTERNAL MEDICINE

## 2020-05-23 PROCEDURE — 80048 BASIC METABOLIC PNL TOTAL CA: CPT | Performed by: INTERNAL MEDICINE

## 2020-05-23 PROCEDURE — 99232 SBSQ HOSP IP/OBS MODERATE 35: CPT | Performed by: INTERNAL MEDICINE

## 2020-05-23 PROCEDURE — 21000001 ZZH R&B HEART CARE

## 2020-05-23 PROCEDURE — 97535 SELF CARE MNGMENT TRAINING: CPT | Mod: GO

## 2020-05-23 PROCEDURE — 25000132 ZZH RX MED GY IP 250 OP 250 PS 637: Mod: GY | Performed by: NURSE PRACTITIONER

## 2020-05-23 PROCEDURE — 25000132 ZZH RX MED GY IP 250 OP 250 PS 637: Mod: GY | Performed by: INTERNAL MEDICINE

## 2020-05-23 PROCEDURE — 85018 HEMOGLOBIN: CPT | Performed by: INTERNAL MEDICINE

## 2020-05-23 RX ADMIN — ASPIRIN 81 MG: 81 TABLET, DELAYED RELEASE ORAL at 08:22

## 2020-05-23 RX ADMIN — ATORVASTATIN CALCIUM 40 MG: 40 TABLET, FILM COATED ORAL at 08:22

## 2020-05-23 RX ADMIN — SODIUM CHLORIDE, POTASSIUM CHLORIDE, SODIUM LACTATE AND CALCIUM CHLORIDE: 600; 310; 30; 20 INJECTION, SOLUTION INTRAVENOUS at 13:14

## 2020-05-23 RX ADMIN — POTASSIUM CHLORIDE 40 MEQ: 1.5 POWDER, FOR SOLUTION ORAL at 07:02

## 2020-05-23 RX ADMIN — POTASSIUM CHLORIDE 20 MEQ: 1.5 POWDER, FOR SOLUTION ORAL at 14:54

## 2020-05-23 RX ADMIN — DIGOXIN 125 MCG: 125 TABLET ORAL at 08:22

## 2020-05-23 RX ADMIN — CLOPIDOGREL BISULFATE 75 MG: 75 TABLET, FILM COATED ORAL at 08:22

## 2020-05-23 RX ADMIN — GABAPENTIN 600 MG: 300 CAPSULE ORAL at 20:30

## 2020-05-23 RX ADMIN — POTASSIUM CHLORIDE 40 MEQ: 1.5 POWDER, FOR SOLUTION ORAL at 05:00

## 2020-05-23 RX ADMIN — SODIUM CHLORIDE, POTASSIUM CHLORIDE, SODIUM LACTATE AND CALCIUM CHLORIDE: 600; 310; 30; 20 INJECTION, SOLUTION INTRAVENOUS at 22:04

## 2020-05-23 RX ADMIN — SODIUM CHLORIDE, POTASSIUM CHLORIDE, SODIUM LACTATE AND CALCIUM CHLORIDE: 600; 310; 30; 20 INJECTION, SOLUTION INTRAVENOUS at 04:12

## 2020-05-23 NOTE — PROGRESS NOTES
[facility]    Cardiology Progress Note     Assessment & Plan     Efrem Ramos is a 74 year old male who was admitted on 5/18/2020 after outpatient PCI and stenting of the RCA prior to consideration for TAVR.      1. Postprocedural shock, hemorrhagic  - Complicated by severe aortic stenosis. Ongoing hypotension requiring NorEpi.   - Now weaned off pressors on 5/21/20 after receiving 1 unit PRBC. BP remain soft but stable.  Hgb down 9.7 to 8.6 today.  No overt signs of bleeding    2. Severe aortic stenosis   - Awaiting TAVR as an outpatient after recovery from recent PCI and problem #1.    3. Severe single-vessel right coronary artery disease   - Status post complex PCI with rotational atherectomy and 4 stents to the proximal to distal RCA on 5/18/20.   - Complicated by RFA bleeding into the groin, penis, and scrotum without retroperitoneal bleeding or pseudoaneurysm.     4. Chronic atrial fibrillation   - Anticoagulated on Eliquis PTA with history of stroke.   - Currently rate controlled except during activity. Off Eliquis currently due to bleeding.   - Off PTA metoprolol due to hypotension. Rate controlled with digoxin 125 mcg daily.    5. Peripheral vascular disease    6. Acute blood loss anemia  - Hgb trended down to 7.8 on 5/21/20. Improved to 9.7 status post transfusion with 1 additional unit of PRBCs on 5/21/20 (Hgb of 12.1 prior to PCI and bleeding).     Plan:  Patient to remain in hospital today with the ongoing uncontrolled heart rates and small drop in Hgb.      -- Recheck Hgb around 4-5pm   -- Continue with aspirin and plavix.  -- Continue holding Eliquis for now. Plan to restart Eliquis and stop aspirin once bleeding risk has subsided and anemia improved.   -- Continue digoxin for rate control. Continue to hold metoprolol for now given continued soft blood pressures off NorEpi. Will attempt to re-initiate as blood pressures allow.   -- TAVR not yet scheduled. He should recover well  from this episode prior to TAVR if possible.       Efrem Ferguson MD    Interval History   No acute events overnight.  Continues to have tachycardia at times and had a 1 gram drop in Hgb.  No overt bleeding.     Physical Exam   Temp: 98.2  F (36.8  C) Temp src: Oral BP: 113/69 Pulse: 92 Heart Rate: 98 Resp: 20 SpO2: 95 % O2 Device: None (Room air)    Vitals:    05/20/20 0400 05/21/20 0600 05/22/20 0600   Weight: 78.7 kg (173 lb 8 oz) 81 kg (178 lb 9.2 oz) 80.2 kg (176 lb 12.9 oz)     Vital Signs with Ranges  Temp:  [97.8  F (36.6  C)-98.2  F (36.8  C)] 98.2  F (36.8  C)  Pulse:  [] 92  Heart Rate:  [] 98  Resp:  [11-31] 20  BP: ()/(56-86) 113/69  SpO2:  [90 %-96 %] 95 %  I/O last 3 completed shifts:  In: 1991 [P.O.:480; I.V.:1511]  Out: 2100 [Urine:2100]    CONSTITUTIONAL: Alert, in no acute distress  HEENT: Normocephalic,atraumatic.   RESP: Lungs clear to auscultation bilaterally.  CV: Irregularly irregular rhythm, controlled rate, with grade 2/6 systolic murmur best heard at the LUSB.  EXTREMITIES: Moves all extremities well and symmetrically. Significant ecchymosis surrounding the right femoral site, penis, and scrotum. Penile and scotal edema noted. No lower extremity edema.  NEURO: No gross focal deficits. Speech clear.     Medications     - MEDICATION INSTRUCTIONS -       - MEDICATION INSTRUCTIONS -       - MEDICATION INSTRUCTIONS -       - MEDICATION INSTRUCTIONS -       lactated ringers 125 mL/hr at 05/23/20 0412     Percutaneous Coronary Intervention orders placed (this is information for BPA alerting)       Percutaneous Coronary Intervention orders placed (this is information for BPA alerting)       ACE/ARB/ARNI NOT PRESCRIBED         aspirin  81 mg Oral Daily     atorvastatin  40 mg Oral Daily     clopidogrel  75 mg Oral Daily     digoxin  125 mcg Oral Daily     gabapentin  600 mg Oral QPM     sodium chloride (PF)  3 mL Intracatheter Q8H       Data   Results for orders placed or  performed during the hospital encounter of 05/18/20 (from the past 24 hour(s))   Lactic acid level STAT   Result Value Ref Range    Lactate for Sepsis Protocol 1.6 0.7 - 2.0 mmol/L   Basic metabolic panel   Result Value Ref Range    Sodium 143 133 - 144 mmol/L    Potassium 2.9 (L) 3.4 - 5.3 mmol/L    Chloride 110 (H) 94 - 109 mmol/L    Carbon Dioxide 27 20 - 32 mmol/L    Anion Gap 6 3 - 14 mmol/L    Glucose 79 70 - 99 mg/dL    Urea Nitrogen 5 (L) 7 - 30 mg/dL    Creatinine 0.65 (L) 0.66 - 1.25 mg/dL    GFR Estimate >90 >60 mL/min/[1.73_m2]    GFR Estimate If Black >90 >60 mL/min/[1.73_m2]    Calcium 7.7 (L) 8.5 - 10.1 mg/dL   CBC (AM Draw)   Result Value Ref Range    WBC 9.5 4.0 - 11.0 10e9/L    RBC Count 2.75 (L) 4.4 - 5.9 10e12/L    Hemoglobin 8.6 (L) 13.3 - 17.7 g/dL    Hematocrit 25.9 (L) 40.0 - 53.0 %    MCV 94 78 - 100 fl    MCH 31.3 26.5 - 33.0 pg    MCHC 33.2 31.5 - 36.5 g/dL    RDW 17.0 (H) 10.0 - 15.0 %    Platelet Count 219 150 - 450 10e9/L

## 2020-05-23 NOTE — PLAN OF CARE
A&Ox4. C/o tenderness in scrotum with movement. Tele AFIB controlled at rest, increases with activity as high as 180 but quickly resolves with rest. SOB with activity. Potassium rechecked and replaced, hemoglobin now stable. Groin ecchymotic and swollen. Ambulated to BR with FWW with assist x1. Plan for home vs TCU possibly tomorrow.

## 2020-05-23 NOTE — PLAN OF CARE
A&Ox4, c/o tenderness in groin with movement. Denies SOB. Tele AFIB, HR elevated with activity to 150's. SBP soft. Voids per urinal, post void bladder scan WNL. Watery BM. Groin and scrotum with dark purple hematoma and edema. Home medications sent to security.

## 2020-05-23 NOTE — PROGRESS NOTES
[facility]    Medicine Progress Note - Hospitalist Service       Date of Admission:  5/18/2020  Assessment & Plan   Efrem Ramos is a 74 year old male with a past medical history of obstructive sleep apnea, hyperlipidemia, CVA, A. fib on chronic anticoagulation with Eliquis, coronary artery disease, mitral stenosis, and severe aortic stenosis who was admitted on 5/18/2020 following complications after an elective heart catheterization with stent placement and rotational artherectomy of RCA in anticipation of TAVR.  Postprocedure, patient was identified to have persistent cath site bleeding with development of massive hematoma and associated hemorrhagic shock.  Patient was identified to have active extravasation from the left common femoral artery. Hemostasis was achieved with ultrasound-guided direct pressure. He was resuscitated and admitted to the ICU with ongoing cares.  Once off of pressors patient transferred out of ICU/hospitalist contacted for resumption of care.    Postprocedure massive hematoma, left groin/scrotum  Hemorrhagic shock, resolved  Acute blood loss anemia  Postprocedure, developed acute hypotension with rapid development of groin/scrotal hematoma. In setting of chronic AC with Eliquis for afib, had been held for 3d in anticipation of procedure. Required initiation of massive transfusion protocol, pressor support.  - CTA A/P revealed active extravasation from left common femoral artery into left groin with extensive hemorrhage. Vascular surgery was urgently consulted, femstop repositioned and hemostasis achieved with direct ultrasound-guided pressure. Duplex ultrasound performed after direct pressure indicated resolution of active extravasation. Pt received a total of 4u PRBCs, last transfusion 5/21 for Hgb 7.8 and persistent hypotension.  Posttransfusion hemoglobin was 9.7, hemoglobin 8.6 today.  Will recheck hemoglobin later today.  -No overt signs of bleeding, scrotal  hematoma about the same per patient  -Continue to monitor daily Hgb  - Transfuse PRN Hgb < 7  - Holding PTA Eliquis given above, anticipate resuming in 1-2 weeks when bleeding risk decreased  - Remains on DAPT with ASA/Plavix given recent PCI.  Ultimate plan is to start Eliquis and Plavix once more stable from bleeding perspective and anemia has improved..  - Urology consulted given scrotal hematoma, scrotal support & elevation, PVRs have been wnl, signed off     Chronic Afib with RVR  Hx afib on chronic AC with Eliquis. PTA maintained on metoprolol for rate control.   - episodic RVR with activity.  Last night heart rate went as high as 180s with activity.  -Patient not tolerating metoprolol due to low/soft blood pressures.  Started on  digoxin temporarily for rate control.  - Continues on digoxin, holding PTA metoprolol, resume per Cardiology  - Holding PTA Eliquis as above     CAD s/p PCIx4 to RCA (5/18/2020)  Hypertension  Hyperlipidemia  Pt with identified RCA disease on initial angiogram for TAVR workup performed 3/23/20, however due to uncertainty on repair approach (surgical v percutaneous) of aortic valve, RCA was not intervened upon at that time. CT TAVR was subsequently performed and indicated favorable anatomy for TAVR. Subsequently underwent angiogram 5/18/2020 with complex PCI, adjunctive rotational arthrectomy with DESx4 from proximal to distal RCA.  - Continues on DAPT with ASA/Plavix given recent stent  - Holding PTA metoprolol due to soft blood pressures, resume per Cardiology     Severe Aortic Stenosis  Undergoing workup for eventual outpatient TAVR.  -Patient should recover well from current episode prior to TAVR  - Cardiology following     MEL  - CPAP with home settings        Diet: Regular Diet Adult    DVT Prophylaxis: Pneumatic Compression Devices  Bravo Catheter: not present  Code Status: Full Code           Disposition Plan   Expected discharge: 1 to 2 days, recommended to transitional care  unit once hemoglobin stable, safe disposition plan/ TCU bed available and Heart rate better controlled and cleared by cardiology.  Entered: Emeli Reyes MD 05/23/2020, 12:51 PM       The patient's care was discussed with the Bedside Nurse and Patient.    Emeli Reyes MD  Hospitalist Service  [facility]    ______________________________________________________________________    Interval History   Patient reports feeling about the same.  Aware that he has to stay another night due to his decreasing hemoglobin and elevated heart rate with activity.  No other nursing concerns.    Data reviewed today: I reviewed all medications, new labs and imaging results over the last 24 hours. I personally reviewed the EKG tracing showing Atrial fibrillation.    Physical Exam   Vital Signs: Temp: 98.1  F (36.7  C) Temp src: Oral BP: 113/73 Pulse: 97 Heart Rate: 98 Resp: 20 SpO2: 95 % O2 Device: None (Room air)    Weight: 176 lbs 12.94 oz  Exam:  Constitutional: Awake, alert and no distress. Appears comfortable  Head: Normocephalic. No masses, lesions, tenderness or abnormalities  ENT: ENT exam normal, no neck nodes or sinus tenderness  Cardiovascular: Tachycardic, irregular, 3+ murmurs, no rubs or JVD  Respiratory: Normal WOB,b/l equal air entry, no wheezes or crackles   Gastrointestinal: Abdomen soft, non-tender. BS normal. No masses, organomegaly  : Hematoma in the scrotum  Extremities : No edema , no clubbing or cyanosis        Data   Recent Labs   Lab 05/23/20  1115 05/23/20  0423 05/22/20  0540 05/21/20  0545 05/21/20  0530  05/19/20  1736 05/19/20  1242  05/19/20  0850  05/18/20  2025  05/18/20  0831   WBC  --  9.5 11.5* 13.0*  --    < >  --   --   --   --   --  21.4*   < > 11.9*   HGB  --  8.6* 9.7* 7.8*  --    < > 9.0* 9.7*   < >  --    < > 12.1*   < > 12.4*   MCV  --  94 93 95  --    < >  --   --   --   --   --  97   < > 101*   PLT  --  219 224 203  --    < >  --   --   --   --   --  238   < > 310   INR   --   --   --   --   --   --   --   --   --   --   --  1.51*  --  1.18*   NA  --  143 140  --  140   < >  --   --   --  141  --  141  --  140   POTASSIUM 3.5 2.9* 3.0*  --  3.6   < >  --   --   --  4.9  --  4.3  --  3.9   CHLORIDE  --  110* 109  --  110*   < >  --   --   --  113*  --  111*  --  109   CO2  --  27 26  --  26   < >  --   --   --  19*  --  20  --  24   BUN  --  5* 6*  --  7   < >  --   --   --  13  --  8  --  8   CR  --  0.65* 0.66  --  0.67   < >  --   --   --  1.08  --  0.86  --  0.84   ANIONGAP  --  6 5  --  4   < >  --   --   --  9  --  10  --  7   ULISSES  --  7.7* 8.0*  --  8.0*   < >  --   --   --  8.4*  --  8.8  --  8.5   GLC  --  79 87  --  94   < >  --   --   --  115*  --  155*  --  84   ALBUMIN  --   --   --   --  2.2*  --   --   --   --   --   --   --   --   --    TROPI  --   --   --   --   --   --  0.645* 0.692*  --  0.802*  --   --   --   --     < > = values in this interval not displayed.     No results found for this or any previous visit (from the past 24 hour(s)).  Medications     - MEDICATION INSTRUCTIONS -       - MEDICATION INSTRUCTIONS -       - MEDICATION INSTRUCTIONS -       - MEDICATION INSTRUCTIONS -       lactated ringers 125 mL/hr at 05/23/20 0412     Percutaneous Coronary Intervention orders placed (this is information for BPA alerting)       Percutaneous Coronary Intervention orders placed (this is information for BPA alerting)       ACE/ARB/ARNI NOT PRESCRIBED         aspirin  81 mg Oral Daily     atorvastatin  40 mg Oral Daily     clopidogrel  75 mg Oral Daily     digoxin  125 mcg Oral Daily     gabapentin  600 mg Oral QPM     sodium chloride (PF)  3 mL Intracatheter Q8H

## 2020-05-23 NOTE — PLAN OF CARE
Discharge Planner OT   Patient plan for discharge: Home    Current status: Pt required min A with FWW for toilet transfer. Pt required mod A for LE dressing task. XK=[phone] bpm during activity with RN notified.     Barriers to return to prior living situation: Current level of A required; Fall risk; Stairs    Recommendations for discharge: TCU    Rationale for recommendations: Pt limited by impaired cognition and safety, decreased balance and activity tolerance, and pain; however, pt making gains with daily OT/CR. Pt would benefit from TCU stay, but hopeful to return home. If discharges home, recommend 24 hr supervision for A with ADLs, use of FWW at all times, and home RN, PT, and OT.        Entered by: Ivonne Quinones 05/23/2020 12:39 PM

## 2020-05-23 NOTE — PLAN OF CARE
A&Ox4. RA. Tele is AFib RVR. BP soft at times. 1 episode of HR up to 180 w/ activity - resolved when pt back to bed. Frequent diarrhea. K+ replaced in AM. Continue to monitor.

## 2020-05-24 ENCOUNTER — APPOINTMENT (OUTPATIENT)
Dept: OCCUPATIONAL THERAPY | Facility: CLINIC | Age: 75
DRG: 246 | End: 2020-05-24
Payer: MEDICARE

## 2020-05-24 LAB
ANION GAP SERPL CALCULATED.3IONS-SCNC: 4 MMOL/L (ref 3–14)
BUN SERPL-MCNC: 4 MG/DL (ref 7–30)
CALCIUM SERPL-MCNC: 7.7 MG/DL (ref 8.5–10.1)
CHLORIDE SERPL-SCNC: 108 MMOL/L (ref 94–109)
CO2 SERPL-SCNC: 27 MMOL/L (ref 20–32)
CREAT SERPL-MCNC: 0.61 MG/DL (ref 0.66–1.25)
ERYTHROCYTE [DISTWIDTH] IN BLOOD BY AUTOMATED COUNT: 17.2 % (ref 10–15)
GFR SERPL CREATININE-BSD FRML MDRD: >90 ML/MIN/{1.73_M2}
GLUCOSE SERPL-MCNC: 80 MG/DL (ref 70–99)
HCT VFR BLD AUTO: 26.2 % (ref 40–53)
HGB BLD-MCNC: 8.8 G/DL (ref 13.3–17.7)
LACTATE BLD-SCNC: 1.3 MMOL/L (ref 0.7–2)
MCH RBC QN AUTO: 31.9 PG (ref 26.5–33)
MCHC RBC AUTO-ENTMCNC: 33.6 G/DL (ref 31.5–36.5)
MCV RBC AUTO: 95 FL (ref 78–100)
PLATELET # BLD AUTO: 241 10E9/L (ref 150–450)
POTASSIUM SERPL-SCNC: 3 MMOL/L (ref 3.4–5.3)
POTASSIUM SERPL-SCNC: 3.6 MMOL/L (ref 3.4–5.3)
RBC # BLD AUTO: 2.76 10E12/L (ref 4.4–5.9)
SODIUM SERPL-SCNC: 139 MMOL/L (ref 133–144)
WBC # BLD AUTO: 10 10E9/L (ref 4–11)

## 2020-05-24 PROCEDURE — 25000132 ZZH RX MED GY IP 250 OP 250 PS 637: Mod: GY | Performed by: PHYSICIAN ASSISTANT

## 2020-05-24 PROCEDURE — 99232 SBSQ HOSP IP/OBS MODERATE 35: CPT | Performed by: INTERNAL MEDICINE

## 2020-05-24 PROCEDURE — 80048 BASIC METABOLIC PNL TOTAL CA: CPT | Performed by: PHYSICIAN ASSISTANT

## 2020-05-24 PROCEDURE — 85027 COMPLETE CBC AUTOMATED: CPT | Performed by: PHYSICIAN ASSISTANT

## 2020-05-24 PROCEDURE — 25800030 ZZH RX IP 258 OP 636: Performed by: PHYSICIAN ASSISTANT

## 2020-05-24 PROCEDURE — 83605 ASSAY OF LACTIC ACID: CPT | Performed by: INTERNAL MEDICINE

## 2020-05-24 PROCEDURE — 97530 THERAPEUTIC ACTIVITIES: CPT | Mod: GO | Performed by: OCCUPATIONAL THERAPIST

## 2020-05-24 PROCEDURE — 25000132 ZZH RX MED GY IP 250 OP 250 PS 637: Mod: GY | Performed by: INTERNAL MEDICINE

## 2020-05-24 PROCEDURE — 21000001 ZZH R&B HEART CARE

## 2020-05-24 PROCEDURE — 84132 ASSAY OF SERUM POTASSIUM: CPT | Performed by: INTERNAL MEDICINE

## 2020-05-24 RX ORDER — ACETAMINOPHEN 325 MG/1
325-650 TABLET ORAL EVERY 6 HOURS PRN
Status: DISCONTINUED | OUTPATIENT
Start: 2020-05-24 | End: 2020-05-28

## 2020-05-24 RX ADMIN — POTASSIUM CHLORIDE 20 MEQ: 1.5 POWDER, FOR SOLUTION ORAL at 13:53

## 2020-05-24 RX ADMIN — SODIUM CHLORIDE, POTASSIUM CHLORIDE, SODIUM LACTATE AND CALCIUM CHLORIDE: 600; 310; 30; 20 INJECTION, SOLUTION INTRAVENOUS at 13:58

## 2020-05-24 RX ADMIN — DIGOXIN 125 MCG: 125 TABLET ORAL at 08:47

## 2020-05-24 RX ADMIN — ATORVASTATIN CALCIUM 40 MG: 40 TABLET, FILM COATED ORAL at 08:47

## 2020-05-24 RX ADMIN — CLOPIDOGREL BISULFATE 75 MG: 75 TABLET, FILM COATED ORAL at 08:47

## 2020-05-24 RX ADMIN — SODIUM CHLORIDE, POTASSIUM CHLORIDE, SODIUM LACTATE AND CALCIUM CHLORIDE: 600; 310; 30; 20 INJECTION, SOLUTION INTRAVENOUS at 06:07

## 2020-05-24 RX ADMIN — ASPIRIN 81 MG: 81 TABLET, DELAYED RELEASE ORAL at 08:47

## 2020-05-24 RX ADMIN — POTASSIUM CHLORIDE 20 MEQ: 1.5 POWDER, FOR SOLUTION ORAL at 18:36

## 2020-05-24 RX ADMIN — POTASSIUM CHLORIDE 40 MEQ: 1.5 POWDER, FOR SOLUTION ORAL at 11:45

## 2020-05-24 RX ADMIN — GABAPENTIN 600 MG: 300 CAPSULE ORAL at 20:41

## 2020-05-24 RX ADMIN — METOPROLOL TARTRATE 12.5 MG: 25 TABLET, FILM COATED ORAL at 11:45

## 2020-05-24 RX ADMIN — SODIUM CHLORIDE, POTASSIUM CHLORIDE, SODIUM LACTATE AND CALCIUM CHLORIDE: 600; 310; 30; 20 INJECTION, SOLUTION INTRAVENOUS at 23:08

## 2020-05-24 RX ADMIN — METOPROLOL TARTRATE 12.5 MG: 25 TABLET, FILM COATED ORAL at 20:41

## 2020-05-24 ASSESSMENT — MIFFLIN-ST. JEOR: SCORE: 1517.35

## 2020-05-24 NOTE — PROGRESS NOTES
SW:  Acknowledging SW consult for discharge planning.  Note PT is recommending TCU.  Will complete consult either later today or tomorrow.

## 2020-05-24 NOTE — PLAN OF CARE
A&Ox4, denies pain. Tele controlled AFIB, elevated HR with activity to 160's. VSS, tolerated metoprolol today. Up to chair x2. Needs encouragement for activity and meals. Plan for discharge home vs TCU in next couple days.

## 2020-05-24 NOTE — PLAN OF CARE
Discharge Planner OT   Patient plan for discharge: home  Current status: supine to sit with min A, sit <> stand and transfer to BSC with CGA and FWW with cues for safety. Per nursing, only bedside ADL's as pt's HR increasing to 140-160's with mobility and ADL's. Pt needing to sit on BSC for awhile and nursing notified and safe sitting on BSC with call light within reach.   Barriers to return to prior living situation: impaired activity tolerance, abdominal pain, impaired strength, safety.   Recommendations for discharge: TCU  Rationale for recommendations: pt requires increased A with ADL/IADL's and functional mobility and Pt will require daily therapy to increase ADL and functional mobility independence and safety to PLOF. Pt does not want to go to TCU, if home will require 24 hr S and A with ADL/IADL's and functional mobility at all times, use of FWW and home RN, OT and PT.        Entered by: Natasha Leyva 05/24/2020 8:19 AM

## 2020-05-24 NOTE — PLAN OF CARE
A&Ox4 w/ VSS. Tele is AFib RVR- Up to 180 w/ activity. Rt groin site ecchymotic/tender. No change. Voiding well. Continue POC.

## 2020-05-24 NOTE — PROGRESS NOTES
[facility]    Cardiology Progress Note     Assessment & Plan     Efrem Ramos is a 74 year old male who was admitted on 5/18/2020 after outpatient PCI and stenting of the RCA prior to consideration for TAVR.      1. Postprocedural shock, hemorrhagic  - Complicated by severe aortic stenosis. Ongoing hypotension requiring NorEpi.   - Now weaned off pressors on 5/21/20 after receiving 1 unit PRBC. BP remain soft but stable.  Hgb stable today.  No overt signs of bleeding    2. Severe aortic stenosis   - Awaiting TAVR as an outpatient after recovery from recent PCI and problem #1.    3. Severe single-vessel right coronary artery disease   - Status post complex PCI with rotational atherectomy and 4 stents to the proximal to distal RCA on 5/18/20.   - Complicated by RFA bleeding into the groin, penis, and scrotum without retroperitoneal bleeding or pseudoaneurysm.     4. Chronic atrial fibrillation   - Anticoagulated on Eliquis PTA with history of stroke.   - Currently rate controlled except during activity. Off Eliquis currently due to bleeding.   - Off PTA metoprolol due to hypotension. Rate controlled with digoxin 125 mcg daily.    5. Peripheral vascular disease    6. Acute blood loss anemia  - Hgb trended down to 7.8 on 5/21/20. Improved to 9.7 status post transfusion with 1 additional unit of PRBCs on 5/21/20 (Hgb of 12.1 prior to PCI and bleeding).     Plan:  Patient to remain in hospital today with the ongoing uncontrolled heart rates      -- Continue with aspirin and plavix.  -- Continue holding Eliquis for now. Plan to restart Eliquis and stop aspirin once bleeding risk has subsided and anemia improved (possibly tomorrow).   -- Continue digoxin for rate control. Will also restart metoprolol today at reduced dose. -- TAVR not yet scheduled. He should recover well from this episode prior to TAVR if possible.       Efrem Ferguson MD    Interval History   No acute events overnight.   No  overt bleeding.     Physical Exam   Temp: 98.7  F (37.1  C) Temp src: Oral BP: 123/77 Pulse: 114 Heart Rate: 116 Resp: 22 SpO2: 93 % O2 Device: None (Room air)    Vitals:    05/21/20 0600 05/22/20 0600 05/24/20 0639   Weight: 81 kg (178 lb 9.2 oz) 80.2 kg (176 lb 12.9 oz) 82.9 kg (182 lb 11.2 oz)     Vital Signs with Ranges  Temp:  [97.8  F (36.6  C)-98.9  F (37.2  C)] 98.7  F (37.1  C)  Pulse:  [] 114  Heart Rate:  [107-116] 116  Resp:  [16-22] 22  BP: (109-128)/(67-86) 123/77  SpO2:  [93 %-97 %] 93 %  I/O last 3 completed shifts:  In: 3501 [P.O.:710; I.V.:2791]  Out: 3975 [Urine:3975]    CONSTITUTIONAL: Alert, in no acute distress  HEENT: Normocephalic,atraumatic.   RESP: Lungs clear to auscultation bilaterally.  CV: Irregularly irregular rhythm, controlled rate, with grade 2/6 systolic murmur best heard at the LUSB.  EXTREMITIES: Moves all extremities well and symmetrically. Significant ecchymosis surrounding the right femoral site, penis, and scrotum. Penile and scotal edema noted. No lower extremity edema.  NEURO: No gross focal deficits. Speech clear.     Medications     - MEDICATION INSTRUCTIONS -       - MEDICATION INSTRUCTIONS -       - MEDICATION INSTRUCTIONS -       - MEDICATION INSTRUCTIONS -       lactated ringers 125 mL/hr at 05/24/20 0607     Percutaneous Coronary Intervention orders placed (this is information for BPA alerting)       ACE/ARB/ARNI NOT PRESCRIBED         aspirin  81 mg Oral Daily     atorvastatin  40 mg Oral Daily     clopidogrel  75 mg Oral Daily     digoxin  125 mcg Oral Daily     gabapentin  600 mg Oral QPM     sodium chloride (PF)  3 mL Intracatheter Q8H       Data   Results for orders placed or performed during the hospital encounter of 05/18/20 (from the past 24 hour(s))   Potassium   Result Value Ref Range    Potassium 3.5 3.4 - 5.3 mmol/L   Hemoglobin   Result Value Ref Range    Hemoglobin 9.8 (L) 13.3 - 17.7 g/dL   CBC (AM Draw)   Result Value Ref Range    WBC 10.0 4.0 -  11.0 10e9/L    RBC Count 2.76 (L) 4.4 - 5.9 10e12/L    Hemoglobin 8.8 (L) 13.3 - 17.7 g/dL    Hematocrit 26.2 (L) 40.0 - 53.0 %    MCV 95 78 - 100 fl    MCH 31.9 26.5 - 33.0 pg    MCHC 33.6 31.5 - 36.5 g/dL    RDW 17.2 (H) 10.0 - 15.0 %    Platelet Count 241 150 - 450 10e9/L   Basic metabolic panel   Result Value Ref Range    Sodium 139 133 - 144 mmol/L    Potassium 3.0 (L) 3.4 - 5.3 mmol/L    Chloride 108 94 - 109 mmol/L    Carbon Dioxide 27 20 - 32 mmol/L    Anion Gap 4 3 - 14 mmol/L    Glucose 80 70 - 99 mg/dL    Urea Nitrogen 4 (L) 7 - 30 mg/dL    Creatinine 0.61 (L) 0.66 - 1.25 mg/dL    GFR Estimate >90 >60 mL/min/[1.73_m2]    GFR Estimate If Black >90 >60 mL/min/[1.73_m2]    Calcium 7.7 (L) 8.5 - 10.1 mg/dL   Lactic acid level STAT   Result Value Ref Range    Lactate for Sepsis Protocol 1.3 0.7 - 2.0 mmol/L

## 2020-05-24 NOTE — PROGRESS NOTES
[facility]    Medicine Progress Note - Hospitalist Service       Date of Admission:  5/18/2020  Assessment & Plan   Efrem Ramos is a 74 year old male with a past medical history of obstructive sleep apnea, hyperlipidemia, CVA, A. fib on chronic anticoagulation with Eliquis, coronary artery disease, mitral stenosis, and severe aortic stenosis who was admitted on 5/18/2020 following complications after an elective heart catheterization with stent placement and rotational artherectomy of RCA in anticipation of TAVR.  Postprocedure, patient was identified to have persistent cath site bleeding with development of massive hematoma and associated hemorrhagic shock.  Patient was identified to have active extravasation from the left common femoral artery. Hemostasis was achieved with ultrasound-guided direct pressure. He was resuscitated and admitted to the ICU with ongoing cares.  Once off of pressors patient transferred out of ICU/hospitalist contacted for resumption of care.    Postprocedure massive hematoma, left groin/scrotum  Hemorrhagic shock, resolved  Acute blood loss anemia  Postprocedure, developed acute hypotension with rapid development of groin/scrotal hematoma. In setting of chronic AC with Eliquis for afib, had been held for 3d in anticipation of procedure. Required initiation of massive transfusion protocol, pressor support.  - CTA A/P revealed active extravasation from left common femoral artery into left groin with extensive hemorrhage. Vascular surgery was urgently consulted, femstop repositioned and hemostasis achieved with direct ultrasound-guided pressure. Duplex ultrasound performed after direct pressure indicated resolution of active extravasation. Pt received a total of 4u PRBCs, last transfusion 5/21 for Hgb 7.8 and persistent hypotension.  Posttransfusion hemoglobin was 9.7, fluctuating between 8.6 and 9.8 since.   -No overt signs of bleeding, scrotal hematoma about the same    -Continue to monitor daily Hgb  - Transfuse PRN Hgb < 7  - Holding PTA Eliquis given above, anticipate resuming in 1-2 weeks when bleeding risk decreased  - Remains on DAPT with ASA/Plavix given recent PCI.  Ultimate plan is to start Eliquis and Plavix once more stable from bleeding perspective and anemia has improved..  - Urology consulted given scrotal hematoma, scrotal support & elevation, PVRs have been wnl, signed off     Chronic Afib with RVR  Hx afib on chronic AC with Eliquis. PTA maintained on metoprolol for rate control.   - episodic RVR with activity.  Last night heart rate went as high as 160s with minimal activity.    -PTA metoprolol was held due to soft/low blood pressure.  Started on  digoxin temporarily for rate control during this hospitalization and heart rate continues to be elevated with minimal activity.  -Low-dose metoprolol initiated today as blood pressure slightly better, continue digoxin  - Holding PTA Eliquis as above     CAD s/p PCIx4 to RCA (5/18/2020)  Hypertension  Hyperlipidemia  Pt with identified RCA disease on initial angiogram for TAVR workup performed 3/23/20, however due to uncertainty on repair approach (surgical v percutaneous) of aortic valve, RCA was not intervened upon at that time. CT TAVR was subsequently performed and indicated favorable anatomy for TAVR. Subsequently underwent angiogram 5/18/2020 with complex PCI, adjunctive rotational arthrectomy with DESx4 from proximal to distal RCA.  - Continues on DAPT with ASA/Plavix given recent stent  -Restarted low-dose metoprolol today, dose adjustment per cardiology     Severe Aortic Stenosis  Undergoing workup for eventual outpatient TAVR.  -Patient should recover well from current episode prior to TAVR  - Cardiology following     MEL  - CPAP with home settings        Diet: Regular Diet Adult    DVT Prophylaxis: Pneumatic Compression Devices  Bravo Catheter: not present  Code Status: Full Code           Disposition Plan    Expected discharge: 1 to 2 days, recommended to transitional care unit once hemoglobin stable, safe disposition plan/ TCU bed available and Heart rate better controlled and cleared by cardiology.  Entered: Emeli Reyes MD 05/24/2020, 9:41 AM       The patient's care was discussed with the Bedside Nurse and Patient.    Emeli Reyes MD  Hospitalist Service  [facility]    ______________________________________________________________________    Interval History   Patient reports feeling about the same.  Still extremely weak.  Did discuss with him recommendation from therapy about him needing to go to rehab, he understands but worried about going to rehab due to COVID condition.  Data reviewed today: I reviewed all medications, new labs and imaging results over the last 24 hours. I personally reviewed the EKG tracing showing Atrial fibrillation.    Physical Exam   Vital Signs: Temp: 98.7  F (37.1  C) Temp src: Oral BP: 123/77 Pulse: 114 Heart Rate: 116 Resp: 22 SpO2: 93 % O2 Device: None (Room air)    Weight: 182 lbs 11.2 oz  Exam:  Constitutional: Awake, alert and no distress. Appears comfortable  Head: Normocephalic. No masses, lesions, tenderness or abnormalities  ENT: ENT exam normal, no neck nodes or sinus tenderness  Cardiovascular: Tachycardic, irregular, 3+ murmurs, no rubs or JVD  Respiratory: Normal WOB,b/l equal air entry, no wheezes or crackles   Gastrointestinal: Abdomen soft, non-tender. BS normal. No masses, organomegaly  : Hematoma in the scrotum  Extremities : No edema , no clubbing or cyanosis        Data   Recent Labs   Lab 05/24/20  0615 05/23/20  1720 05/23/20  1115 05/23/20  0423 05/22/20  0540  05/21/20  0530  05/19/20  1736 05/19/20  1242  05/19/20  0850  05/18/20  2025  05/18/20  0831   WBC 10.0  --   --  9.5 11.5*   < >  --    < >  --   --   --   --   --  21.4*   < > 11.9*   HGB 8.8* 9.8*  --  8.6* 9.7*   < >  --    < > 9.0* 9.7*   < >  --    < > 12.1*   < > 12.4*   MCV  95  --   --  94 93   < >  --    < >  --   --   --   --   --  97   < > 101*     --   --  219 224   < >  --    < >  --   --   --   --   --  238   < > 310   INR  --   --   --   --   --   --   --   --   --   --   --   --   --  1.51*  --  1.18*     --   --  143 140  --  140   < >  --   --   --  141  --  141  --  140   POTASSIUM 3.0*  --  3.5 2.9* 3.0*  --  3.6   < >  --   --   --  4.9  --  4.3  --  3.9   CHLORIDE 108  --   --  110* 109  --  110*   < >  --   --   --  113*  --  111*  --  109   CO2 27  --   --  27 26  --  26   < >  --   --   --  19*  --  20  --  24   BUN 4*  --   --  5* 6*  --  7   < >  --   --   --  13  --  8  --  8   CR 0.61*  --   --  0.65* 0.66  --  0.67   < >  --   --   --  1.08  --  0.86  --  0.84   ANIONGAP 4  --   --  6 5  --  4   < >  --   --   --  9  --  10  --  7   ULISSES 7.7*  --   --  7.7* 8.0*  --  8.0*   < >  --   --   --  8.4*  --  8.8  --  8.5   GLC 80  --   --  79 87  --  94   < >  --   --   --  115*  --  155*  --  84   ALBUMIN  --   --   --   --   --   --  2.2*  --   --   --   --   --   --   --   --   --    TROPI  --   --   --   --   --   --   --   --  0.645* 0.692*  --  0.802*  --   --   --   --     < > = values in this interval not displayed.     No results found for this or any previous visit (from the past 24 hour(s)).  Medications     - MEDICATION INSTRUCTIONS -       - MEDICATION INSTRUCTIONS -       - MEDICATION INSTRUCTIONS -       - MEDICATION INSTRUCTIONS -       lactated ringers 125 mL/hr at 05/24/20 0607     Percutaneous Coronary Intervention orders placed (this is information for BPA alerting)       ACE/ARB/ARNI NOT PRESCRIBED         aspirin  81 mg Oral Daily     atorvastatin  40 mg Oral Daily     clopidogrel  75 mg Oral Daily     digoxin  125 mcg Oral Daily     gabapentin  600 mg Oral QPM     sodium chloride (PF)  3 mL Intracatheter Q8H

## 2020-05-24 NOTE — PROGRESS NOTES
SPIRITUAL HEALTH SERVICES Progress Note  [facility]    Initiated phone visit due to LOS.  Pt declined offer for visit at this time - SH remains available for further support upon pt request.      Timo Carmen  Chaplain Resident

## 2020-05-25 LAB
HGB BLD-MCNC: 8.7 G/DL (ref 13.3–17.7)
HGB BLD-MCNC: 8.8 G/DL (ref 13.3–17.7)
POTASSIUM SERPL-SCNC: 3.2 MMOL/L (ref 3.4–5.3)
POTASSIUM SERPL-SCNC: 3.7 MMOL/L (ref 3.4–5.3)

## 2020-05-25 PROCEDURE — 25800030 ZZH RX IP 258 OP 636: Performed by: PHYSICIAN ASSISTANT

## 2020-05-25 PROCEDURE — 25000132 ZZH RX MED GY IP 250 OP 250 PS 637: Mod: GY | Performed by: PHYSICIAN ASSISTANT

## 2020-05-25 PROCEDURE — 84132 ASSAY OF SERUM POTASSIUM: CPT | Performed by: INTERNAL MEDICINE

## 2020-05-25 PROCEDURE — 21000001 ZZH R&B HEART CARE

## 2020-05-25 PROCEDURE — 99232 SBSQ HOSP IP/OBS MODERATE 35: CPT | Performed by: INTERNAL MEDICINE

## 2020-05-25 PROCEDURE — 85018 HEMOGLOBIN: CPT | Performed by: INTERNAL MEDICINE

## 2020-05-25 PROCEDURE — 25000128 H RX IP 250 OP 636: Performed by: INTERNAL MEDICINE

## 2020-05-25 PROCEDURE — 25000132 ZZH RX MED GY IP 250 OP 250 PS 637: Mod: GY | Performed by: INTERNAL MEDICINE

## 2020-05-25 RX ORDER — HEPARIN SODIUM,PORCINE 10 UNIT/ML
5-10 VIAL (ML) INTRAVENOUS
Status: DISCONTINUED | OUTPATIENT
Start: 2020-05-25 | End: 2020-06-04 | Stop reason: HOSPADM

## 2020-05-25 RX ORDER — HEPARIN SODIUM,PORCINE 10 UNIT/ML
5-10 VIAL (ML) INTRAVENOUS EVERY 24 HOURS
Status: DISCONTINUED | OUTPATIENT
Start: 2020-05-25 | End: 2020-06-04 | Stop reason: HOSPADM

## 2020-05-25 RX ORDER — LIDOCAINE 40 MG/G
CREAM TOPICAL
Status: DISCONTINUED | OUTPATIENT
Start: 2020-05-25 | End: 2020-05-25

## 2020-05-25 RX ADMIN — ATORVASTATIN CALCIUM 40 MG: 40 TABLET, FILM COATED ORAL at 08:27

## 2020-05-25 RX ADMIN — METOPROLOL TARTRATE 12.5 MG: 25 TABLET, FILM COATED ORAL at 08:27

## 2020-05-25 RX ADMIN — Medication 5 ML: at 06:57

## 2020-05-25 RX ADMIN — POTASSIUM CHLORIDE 20 MEQ: 1.5 POWDER, FOR SOLUTION ORAL at 18:03

## 2020-05-25 RX ADMIN — SODIUM CHLORIDE, POTASSIUM CHLORIDE, SODIUM LACTATE AND CALCIUM CHLORIDE: 600; 310; 30; 20 INJECTION, SOLUTION INTRAVENOUS at 08:26

## 2020-05-25 RX ADMIN — Medication 5 ML: at 17:28

## 2020-05-25 RX ADMIN — ASPIRIN 81 MG: 81 TABLET, DELAYED RELEASE ORAL at 08:27

## 2020-05-25 RX ADMIN — POTASSIUM CHLORIDE 20 MEQ: 1.5 POWDER, FOR SOLUTION ORAL at 13:26

## 2020-05-25 RX ADMIN — METOPROLOL TARTRATE 12.5 MG: 25 TABLET, FILM COATED ORAL at 23:41

## 2020-05-25 RX ADMIN — CLOPIDOGREL BISULFATE 75 MG: 75 TABLET, FILM COATED ORAL at 08:27

## 2020-05-25 RX ADMIN — SODIUM CHLORIDE, PRESERVATIVE FREE 5 ML: 5 INJECTION INTRAVENOUS at 06:56

## 2020-05-25 RX ADMIN — GABAPENTIN 600 MG: 300 CAPSULE ORAL at 23:40

## 2020-05-25 RX ADMIN — APIXABAN 5 MG: 5 TABLET, FILM COATED ORAL at 23:41

## 2020-05-25 RX ADMIN — DIGOXIN 125 MCG: 125 TABLET ORAL at 08:27

## 2020-05-25 RX ADMIN — POTASSIUM CHLORIDE 40 MEQ: 1500 TABLET, EXTENDED RELEASE ORAL at 11:12

## 2020-05-25 ASSESSMENT — MIFFLIN-ST. JEOR: SCORE: 1473.35

## 2020-05-25 NOTE — PROGRESS NOTES
[facility]    Cardiology Progress Note     Assessment & Plan     Efrem Ramos is a 74 year old male who was admitted on 5/18/2020 after outpatient PCI and stenting of the RCA prior to consideration for TAVR.      1. Postprocedural shock, hemorrhagic  - Complicated by severe aortic stenosis. Ongoing hypotension requiring NorEpi.   - Now weaned off pressors on 5/21/20 after receiving 1 unit PRBC. BP remain soft but stable.  Hgb stable today.  No overt signs of bleeding    2. Severe aortic stenosis   - Awaiting TAVR as an outpatient after recovery from recent PCI and problem #1.    3. Severe single-vessel right coronary artery disease   - Status post complex PCI with rotational atherectomy and 4 stents to the proximal to distal RCA on 5/18/20.   - Complicated by RFA bleeding into the groin, penis, and scrotum without retroperitoneal bleeding or pseudoaneurysm.     4. Chronic atrial fibrillation   - Anticoagulated on Eliquis PTA with history of stroke.   - Currently rate controlled except during activity. Off Eliquis currently due to bleeding.   - Off PTA metoprolol due to hypotension. Rate controlled with digoxin 125 mcg daily.    5. Peripheral vascular disease    6. Acute blood loss anemia  - Hgb trended down to 7.8 on 5/21/20. Improved to 9.7 status post transfusion with 1 additional unit of PRBCs on 5/21/20 (Hgb of 12.1 prior to PCI and bleeding).     Plan:  --Patient is very weak and will need to discharge to a facility in my opinion  --heart rates have improved and Hgb is stable today.    --metoprolol 12.5 BID added yesterday, we can continue digoxin today and then likely stop this tomorrow with uptitration of metoprolol  -- discharge aspirin today  --Continue Plavix   --Start Eliquis today (this evening) for stroke PPx in the setting of Afib.  Multiple days of stable Hgb at this point.    -- TAVR not yet scheduled. He should recover well from this episode prior to TAVR if possible.       Efrem  Kennedy Ferguson MD    Interval History   No acute events overnight.   No overt bleeding.     Physical Exam   Temp: 98.5  F (36.9  C) Temp src: Oral BP: 115/85 Pulse: 121 Heart Rate: 93 Resp: 20 SpO2: 96 % O2 Device: Nasal cannula Oxygen Delivery: 1.5 LPM  Vitals:    05/22/20 0600 05/24/20 0639 05/25/20 0544   Weight: 80.2 kg (176 lb 12.9 oz) 82.9 kg (182 lb 11.2 oz) 78.5 kg (173 lb)     Vital Signs with Ranges  Temp:  [97.6  F (36.4  C)-99  F (37.2  C)] 98.5  F (36.9  C)  Pulse:  [] 121  Heart Rate:  [] 93  Resp:  [18-24] 20  BP: (108-129)/(59-85) 115/85  SpO2:  [89 %-97 %] 96 %  I/O last 3 completed shifts:  In: 4763 [P.O.:440; I.V.:4323]  Out: 3520 [Urine:3520]    CONSTITUTIONAL: Alert, in no acute distress  HEENT: Normocephalic,atraumatic.   RESP: Lungs clear to auscultation bilaterally.  CV: Irregularly irregular rhythm, controlled rate, with grade 2/6 systolic murmur best heard at the LUSB.  EXTREMITIES: Moves all extremities well and symmetrically. Significant ecchymosis surrounding the right femoral site, penis, and scrotum. Penile and scotal edema noted. No lower extremity edema.  NEURO: No gross focal deficits. Speech clear.     Medications     - MEDICATION INSTRUCTIONS -       - MEDICATION INSTRUCTIONS -       - MEDICATION INSTRUCTIONS -       - MEDICATION INSTRUCTIONS -       lactated ringers 125 mL/hr at 05/25/20 0826     Percutaneous Coronary Intervention orders placed (this is information for BPA alerting)       ACE/ARB/ARNI NOT PRESCRIBED         aspirin  81 mg Oral Daily     atorvastatin  40 mg Oral Daily     clopidogrel  75 mg Oral Daily     digoxin  125 mcg Oral Daily     gabapentin  600 mg Oral QPM     heparin lock flush  5-10 mL Intracatheter Q24H     metoprolol tartrate  12.5 mg Oral BID     sodium chloride (PF)  3 mL Intracatheter Q8H       Data   Results for orders placed or performed during the hospital encounter of 05/18/20 (from the past 24 hour(s))   Potassium   Result Value  Ref Range    Potassium 3.6 3.4 - 5.3 mmol/L   Hemoglobin   Result Value Ref Range    Hemoglobin 8.7 (L) 13.3 - 17.7 g/dL   Hemoglobin   Result Value Ref Range    Hemoglobin 8.8 (L) 13.3 - 17.7 g/dL   Potassium   Result Value Ref Range    Potassium 3.2 (L) 3.4 - 5.3 mmol/L

## 2020-05-25 NOTE — PROGRESS NOTES
"BRIEF NUTRITION ASSESSMENT      REASON FOR ASSESSMENT:  Efrem Ramos is a 74 year old male seen by Registered Dietitian for [facility]    NUTRITION HISTORY:  Patient known to our services from past hospitalizations.  Most recent encounter was in 2017 -->  Received positive distress screen regarding patient's concern for ability to eat.  Called and spoke with patient's wife as patient was unavailable.  Patient's wife states patient eats 1 meal per day and snacks throughout the day which he has done for years.  Encouraged wife if patient desires to discuss nutrition concerns to make appointment with RD when he returns to clinic for follow up appointment with MD.  Patient's wife verbalized understanding of plan.      CURRENT DIET AND INTAKE:  Diet:  Regular               Noted patient has been ordered one meal per day (which is baseline for him) and also ordering a Boost (which he takes at home based on my colleague's note from 2016).    Per flowsheets, taking % of meals.   Dinner last night was a grilled cheese sandwich, chips, and juice.     ANTHROPOMETRICS:  Height: 5' 7\"  Weight:  74.3 kg (163#)(5/18)  Body mass index is 25.5 kg/m2   Weight Status: Overweight BMI 25-29.9  IBW:  67.3 kg   %IBW: 110%  Weight History:   Wt Readings from Last 10 Encounters:   05/25/20 78.5 kg (173 lb)   05/13/20 73.9 kg (163 lb)   04/17/20 71.7 kg (158 lb)   03/23/20 74.9 kg (165 lb 3.2 oz)   03/16/20 75.3 kg (166 lb)   02/26/20 74.8 kg (165 lb)   02/24/20 74.8 kg (165 lb)   12/27/19 71.7 kg (158 lb)   11/21/19 74.8 kg (164 lb 12.8 oz)   08/14/19 71.7 kg (158 lb)     No weight loss noted     LABS:  Labs noted    MALNUTRITION:  Patient does not meet two of the criteria necessary for diagnosing malnutrition.     NUTRITION INTERVENTION:  Nutrition Diagnosis:  No nutrition diagnosis at this time.    Implementation:  Nutrition Education:  Per Provider order if indicated.    FOLLOW UP/MONITORING:   Will re-evaluate in 7 - 10 days, or " sooner, if re-consulted.    Rocio Chang RD, LD, CNSC   Clinical Dietitian - [facility]

## 2020-05-25 NOTE — PROGRESS NOTES
[facility]    Medicine Progress Note - Hospitalist Service       Date of Admission:  5/18/2020  Assessment & Plan   Efrem Ramos is a 74 year old male with a past medical history of obstructive sleep apnea, hyperlipidemia, CVA, A. fib on chronic anticoagulation with Eliquis, coronary artery disease, mitral stenosis, and severe aortic stenosis who was admitted on 5/18/2020 following complications after an elective heart catheterization with stent placement and rotational artherectomy of RCA in anticipation of TAVR.  Postprocedure, patient was identified to have persistent cath site bleeding with development of massive hematoma and associated hemorrhagic shock.  Patient was identified to have active extravasation from the left common femoral artery. Hemostasis was achieved with ultrasound-guided direct pressure. He was resuscitated and admitted to the ICU with ongoing cares.  Once off of pressors patient transferred out of ICU/hospitalist contacted for resumption of care.    Postprocedure massive hematoma, left groin/scrotum  Hemorrhagic shock, resolved  Acute blood loss anemia  Postprocedure, developed acute hypotension with rapid development of groin/scrotal hematoma. In setting of chronic AC with Eliquis for afib, had been held for 3d in anticipation of procedure. Required initiation of massive transfusion protocol, pressor support.  - CTA A/P revealed active extravasation from left common femoral artery into left groin with extensive hemorrhage. Vascular surgery was urgently consulted, femstop repositioned and hemostasis achieved with direct ultrasound-guided pressure. Duplex ultrasound performed after direct pressure indicated resolution of active extravasation. Pt received a total of 4u PRBCs, last transfusion 5/21 for Hgb 7.8 and persistent hypotension.  Posttransfusion hemoglobin was 9.7, fluctuating between 8.6 and 9.8 since.   -No overt signs of bleeding, scrotal hematoma about the same    -Continue to monitor daily Hgb  - Transfuse PRN Hgb < 7  - Holding PTA Eliquis given above, anticipate resuming in 1-2 weeks when bleeding risk decreased  - Was on DAPT with ASA/Plavix given recent PCI.  This patient's hemoglobin has been stable for last several days cardiology started Eliquis and he discontinued aspirin (5/25)  continue Plavix.  - Urology consulted given scrotal hematoma, scrotal support & elevation, PVRs have been wnl, signed off     Chronic Afib with RVR  Hx afib on chronic AC with Eliquis. PTA maintained on metoprolol for rate control.   - episodic RVR with activity.  Last night heart rate went as high as 160s with minimal activity.    -PTA metoprolol resumed at a lower dose 12.5 twice daily 5/24/2020.  Started on  digoxin temporarily for rate control during this hospitalization and heart rate continues to be elevated with minimal activity.  However gradually getting better.  - Holding PTA Eliquis as above       Hypoxia  -Patient started needing oxygen, DC IV fluid  -Monitor closely, incentive spirometry, early ambulation    CAD s/p PCIx4 to RCA (5/18/2020)  Hypertension  Hyperlipidemia  Pt with identified RCA disease on initial angiogram for TAVR workup performed 3/23/20, however due to uncertainty on repair approach (surgical v percutaneous) of aortic valve, RCA was not intervened upon at that time. CT TAVR was subsequently performed and indicated favorable anatomy for TAVR. Subsequently underwent angiogram 5/18/2020 with complex PCI, adjunctive rotational arthrectomy with DESx4 from proximal to distal RCA.  - Continues on DAPT with ASA/Plavix given recent stent  -Restarted low-dose metoprolol 5/24/2020, dose adjustment per cardiology     Severe Aortic Stenosis  Undergoing workup for eventual outpatient TAVR.  -Patient should recover well from current episode prior to TAVR  - Cardiology following     MEL  - CPAP with home settings        Diet: Regular Diet Adult    DVT Prophylaxis: Pneumatic  Compression Devices  Bravo Catheter: not present  Code Status: Full Code           Disposition Plan   Expected discharge: 1 to 2 days, recommended to transitional care unit once hemoglobin stable, safe disposition plan/ TCU bed available and Heart rate better controlled and cleared by cardiology.  Entered: Emeli Reyes MD 05/25/2020, 12:54 PM       The patient's care was discussed with the Bedside Nurse and Patient.    Emeli Reyes MD  Hospitalist Service  [facility]    ______________________________________________________________________    Interval History   Patient reports feeling about the same.  Still extremely weak.  No new nursing concerns.  Heart rate goes up to 140 with minimal activity but improves faster.    Data reviewed today: I reviewed all medications, new labs and imaging results over the last 24 hours. I personally reviewed the EKG tracing showing Atrial fibrillation.    Physical Exam   Vital Signs: Temp: 98.5  F (36.9  C) Temp src: Oral BP: 115/85 Pulse: 121 Heart Rate: 93 Resp: 20 SpO2: 96 % O2 Device: Nasal cannula Oxygen Delivery: 1.5 LPM  Weight: 173 lbs 0 oz  Exam:  Constitutional: Awake, alert and no distress. Appears comfortable  Head: Normocephalic. No masses, lesions, tenderness or abnormalities  ENT: ENT exam normal, no neck nodes or sinus tenderness  Cardiovascular: Tachycardic, irregular, 3+ murmurs, no rubs or JVD  Respiratory: Normal WOB,b/l equal air entry, no wheezes or crackles   Gastrointestinal: Abdomen soft, non-tender. BS normal. No masses, organomegaly  : Hematoma in the scrotum appears stable  Extremities : No edema , no clubbing or cyanosis        Data   Recent Labs   Lab 05/25/20  0700 05/25/20  0150 05/24/20  1720 05/24/20  0615  05/23/20  0423 05/22/20  0540  05/21/20  0530  05/19/20  1736 05/19/20  1242  05/19/20  0850  05/18/20 2025   WBC  --   --   --  10.0  --  9.5 11.5*   < >  --    < >  --   --   --   --   --  21.4*   HGB 8.8* 8.7*  --  8.8*    < > 8.6* 9.7*   < >  --    < > 9.0* 9.7*   < >  --    < > 12.1*   MCV  --   --   --  95  --  94 93   < >  --    < >  --   --   --   --   --  97   PLT  --   --   --  241  --  219 224   < >  --    < >  --   --   --   --   --  238   INR  --   --   --   --   --   --   --   --   --   --   --   --   --   --   --  1.51*   NA  --   --   --  139  --  143 140  --  140   < >  --   --   --  141  --  141   POTASSIUM 3.2*  --  3.6 3.0*   < > 2.9* 3.0*  --  3.6   < >  --   --   --  4.9  --  4.3   CHLORIDE  --   --   --  108  --  110* 109  --  110*   < >  --   --   --  113*  --  111*   CO2  --   --   --  27  --  27 26  --  26   < >  --   --   --  19*  --  20   BUN  --   --   --  4*  --  5* 6*  --  7   < >  --   --   --  13  --  8   CR  --   --   --  0.61*  --  0.65* 0.66  --  0.67   < >  --   --   --  1.08  --  0.86   ANIONGAP  --   --   --  4  --  6 5  --  4   < >  --   --   --  9  --  10   ULISSES  --   --   --  7.7*  --  7.7* 8.0*  --  8.0*   < >  --   --   --  8.4*  --  8.8   GLC  --   --   --  80  --  79 87  --  94   < >  --   --   --  115*  --  155*   ALBUMIN  --   --   --   --   --   --   --   --  2.2*  --   --   --   --   --   --   --    TROPI  --   --   --   --   --   --   --   --   --   --  0.645* 0.692*  --  0.802*  --   --     < > = values in this interval not displayed.     No results found for this or any previous visit (from the past 24 hour(s)).  Medications     - MEDICATION INSTRUCTIONS -       - MEDICATION INSTRUCTIONS -       - MEDICATION INSTRUCTIONS -       - MEDICATION INSTRUCTIONS -       Percutaneous Coronary Intervention orders placed (this is information for BPA alerting)       ACE/ARB/ARNI NOT PRESCRIBED         apixaban ANTICOAGULANT  5 mg Oral BID     atorvastatin  40 mg Oral Daily     clopidogrel  75 mg Oral Daily     digoxin  125 mcg Oral Daily     gabapentin  600 mg Oral QPM     heparin lock flush  5-10 mL Intracatheter Q24H     metoprolol tartrate  12.5 mg Oral BID     sodium chloride (PF)  3 mL  Intracatheter Q8H

## 2020-05-25 NOTE — PLAN OF CARE
VSS. Tele: a fib RVR. Up to commode for BM. Groin sites ecchymotic, no bruit, good CMS and pulses. Will continue to monitor.

## 2020-05-25 NOTE — PLAN OF CARE
A&Ox4. C/o tenderness in scrotum with movement, bruised, swollen. Tele AFIB controlled at rest, increases with activity as high as 140-150 but quickly resolves with rest. SOB with activity. RA 95%, O2 at times for SOB. Potassium rechecked and replaced, hemoglobin now stable. Groin ecchymotic and swollen. Ambulated to BR/BSC with FWW with assist x1. Plan for home vs TCU possibly tomorrow.

## 2020-05-26 ENCOUNTER — APPOINTMENT (OUTPATIENT)
Dept: OCCUPATIONAL THERAPY | Facility: CLINIC | Age: 75
DRG: 246 | End: 2020-05-26
Payer: MEDICARE

## 2020-05-26 LAB
INTERPRETATION ECG - MUSE: NORMAL
LACTATE BLD-SCNC: 1.4 MMOL/L (ref 0.7–2)
MAGNESIUM SERPL-MCNC: 1.5 MG/DL (ref 1.6–2.3)
MAGNESIUM SERPL-MCNC: 2.8 MG/DL (ref 1.6–2.3)
PHOSPHATE SERPL-MCNC: 3.3 MG/DL (ref 2.5–4.5)
POTASSIUM SERPL-SCNC: 3.3 MMOL/L (ref 3.4–5.3)
POTASSIUM SERPL-SCNC: 3.8 MMOL/L (ref 3.4–5.3)

## 2020-05-26 PROCEDURE — 25000128 H RX IP 250 OP 636: Performed by: INTERNAL MEDICINE

## 2020-05-26 PROCEDURE — 25000132 ZZH RX MED GY IP 250 OP 250 PS 637: Mod: GY | Performed by: PHYSICIAN ASSISTANT

## 2020-05-26 PROCEDURE — 25000128 H RX IP 250 OP 636: Performed by: PHYSICIAN ASSISTANT

## 2020-05-26 PROCEDURE — 25000132 ZZH RX MED GY IP 250 OP 250 PS 637: Mod: GY | Performed by: INTERNAL MEDICINE

## 2020-05-26 PROCEDURE — 97535 SELF CARE MNGMENT TRAINING: CPT | Mod: GO

## 2020-05-26 PROCEDURE — 25000132 ZZH RX MED GY IP 250 OP 250 PS 637: Mod: GY | Performed by: UROLOGY

## 2020-05-26 PROCEDURE — 84100 ASSAY OF PHOSPHORUS: CPT | Performed by: INTERNAL MEDICINE

## 2020-05-26 PROCEDURE — 99232 SBSQ HOSP IP/OBS MODERATE 35: CPT | Performed by: INTERNAL MEDICINE

## 2020-05-26 PROCEDURE — 83735 ASSAY OF MAGNESIUM: CPT | Performed by: INTERNAL MEDICINE

## 2020-05-26 PROCEDURE — 83605 ASSAY OF LACTIC ACID: CPT | Performed by: HOSPITALIST

## 2020-05-26 PROCEDURE — 84132 ASSAY OF SERUM POTASSIUM: CPT | Performed by: INTERNAL MEDICINE

## 2020-05-26 PROCEDURE — 99233 SBSQ HOSP IP/OBS HIGH 50: CPT | Performed by: INTERNAL MEDICINE

## 2020-05-26 PROCEDURE — 21000001 ZZH R&B HEART CARE

## 2020-05-26 RX ORDER — TAMSULOSIN HYDROCHLORIDE 0.4 MG/1
0.4 CAPSULE ORAL DAILY
Status: DISCONTINUED | OUTPATIENT
Start: 2020-05-26 | End: 2020-06-03

## 2020-05-26 RX ADMIN — Medication 5 ML: at 13:58

## 2020-05-26 RX ADMIN — POTASSIUM CHLORIDE 40 MEQ: 1.5 POWDER, FOR SOLUTION ORAL at 08:16

## 2020-05-26 RX ADMIN — APIXABAN 5 MG: 5 TABLET, FILM COATED ORAL at 21:49

## 2020-05-26 RX ADMIN — METOPROLOL TARTRATE 12.5 MG: 25 TABLET, FILM COATED ORAL at 08:02

## 2020-05-26 RX ADMIN — POTASSIUM CHLORIDE 20 MEQ: 1.5 POWDER, FOR SOLUTION ORAL at 10:06

## 2020-05-26 RX ADMIN — SODIUM CHLORIDE, PRESERVATIVE FREE 5 ML: 5 INJECTION INTRAVENOUS at 06:48

## 2020-05-26 RX ADMIN — GABAPENTIN 600 MG: 300 CAPSULE ORAL at 21:49

## 2020-05-26 RX ADMIN — ATORVASTATIN CALCIUM 40 MG: 40 TABLET, FILM COATED ORAL at 08:02

## 2020-05-26 RX ADMIN — ACETAMINOPHEN 650 MG: 325 TABLET, FILM COATED ORAL at 15:46

## 2020-05-26 RX ADMIN — TAMSULOSIN HYDROCHLORIDE 0.4 MG: 0.4 CAPSULE ORAL at 13:51

## 2020-05-26 RX ADMIN — CLOPIDOGREL BISULFATE 75 MG: 75 TABLET, FILM COATED ORAL at 08:03

## 2020-05-26 RX ADMIN — POTASSIUM CHLORIDE 20 MEQ: 1.5 POWDER, FOR SOLUTION ORAL at 15:47

## 2020-05-26 RX ADMIN — APIXABAN 5 MG: 5 TABLET, FILM COATED ORAL at 08:02

## 2020-05-26 RX ADMIN — Medication 5 ML: at 21:51

## 2020-05-26 RX ADMIN — Medication 5 ML: at 11:42

## 2020-05-26 RX ADMIN — MAGNESIUM SULFATE IN WATER 4 G: 40 INJECTION, SOLUTION INTRAVENOUS at 09:32

## 2020-05-26 RX ADMIN — DIGOXIN 125 MCG: 125 TABLET ORAL at 08:03

## 2020-05-26 RX ADMIN — METOPROLOL TARTRATE 12.5 MG: 25 TABLET, FILM COATED ORAL at 21:49

## 2020-05-26 ASSESSMENT — MIFFLIN-ST. JEOR: SCORE: 1457.48

## 2020-05-26 NOTE — PLAN OF CARE
Discharge Planner OT   Patient plan for discharge: now agreeable to TCU  Current status: sit>stand and ambulation to/from restroom using 2WW w/ CGA and VCs. Required seated rest break prior to completing oral-care standing, then completed w/ SBA-CGA, no LOB. Pt required another seated rest break 2' fatigue and SOB. Pt completed toileting w/ CGA standing, had difficulty completing pericare seated, then cues for attempting while standing, then was able to complete w/ CGA. Pt very fatigued after completion, HR at 180 at highest briefly, otherwise ranged from 110's to 160. Pt hypotensive at rest, dec to 72/63, then after a few min increased to 90s systolic.   Barriers to return to prior living situation: medical status, impaired functional act tolerance,scrotal edema and pain  Recommendations for discharge: TCU  Rationale for recommendations:  pt requires increased A with ADL/IADL's and functional mobility and Pt will require daily therapy to increase ADL and functional mobility independence and safety to PLOF. Pt does not want to go to TCU, if home will require 24 hr S and A with ADL/IADL's and functional mobility at all times, use of FWW and home RN, OT and PT.        Entered by: Joan Craft 05/26/2020 3:17 PM

## 2020-05-26 NOTE — PROGRESS NOTES
[facility]  Cardiology Progress Note  Date of Service: 05/26/2020  Primary Cardiologist: Dr. Xiong     Assessment & Plan    Efrem Ramos is a 74 year old male with past medical history significant for MEL, PVD, HLD, CVA 2015, atrial fibrillation, severe aortic stenosis and mild to moderate mitral valve stenosis admitted on 5/18/2020. He underwent and elective RCA catheterization with intervention prior to TAVR. Post procedurally he developed persistent bleeding from his left femoral access site.     Assessment:   1. Hemorrhagic shock secondary to left femoral artery prolonged bleeding post coronary angiogram.    Complicated by severe AS    CT scan showed active extravasation of the common iliac vs prox common femoral.FemStop was repositioned based on US.     Initially requiring NorEpi and now weaned off pressor support as of 5/21.    Hemoglobin dropped to 7.8 and improved to 9.7 post transfusion of 1 unit (12.1 prior to procedure)    Hemoglobin remains stable after first dose of Eliquis    Metoprolol restarted 5/24 BP soft, but stable    2. Coronary artery disease    Status post PCI with adjunctive rotational atherectomy with drug-eluting stents x4 from proximal to distal RCA on 5/18/2020    ASA+Plavix+Eliquis x 1 week.     Now on Plavix + Eliquis as of 5/25    3. Severe aortic stenosis    Awaiting TAVR    4. Chronic atrial fibrillation with history of CVA    Eliquis was initially held due to bleed, but was restarted last evening 5/25    Plan:  1.     FABI WARD CNP  Pager:  [phone]   (7am - 5pm, M-F)    Interval History   Continues to have elevated HRs with activity with drop in BP    Physical Exam   Temp: 98.4  F (36.9  C) Temp src: Oral BP: 104/73 Pulse: 129(with activity) Heart Rate: 89 Resp: 20 SpO2: 97 % O2 Device: Nasal cannula Oxygen Delivery: 1 LPM  Vitals:    05/24/20 0639 05/25/20 0544 05/26/20 0912   Weight: 82.9 kg (182 lb 11.2 oz) 78.5 kg (173 lb) 76.9 kg (169 lb 8  oz)       Constitutional:   NAD   Skin:   Warm and dry   Head:   Nontraumatic   Neck:   no JVD   Lungs:   symmetric, clear to auscultation   Cardiovascular:   irregularly irregular rhythm and 2/6 PRAFUL    Abdomen:   Benign   Extremities and Back:   Left groin site with diffuse hematoma with palpable pulses   Neurological:   Grossly nonfocal       Medications     - MEDICATION INSTRUCTIONS -       - MEDICATION INSTRUCTIONS -       - MEDICATION INSTRUCTIONS -       - MEDICATION INSTRUCTIONS -       Percutaneous Coronary Intervention orders placed (this is information for BPA alerting)       ACE/ARB/ARNI NOT PRESCRIBED         apixaban ANTICOAGULANT  5 mg Oral BID     atorvastatin  40 mg Oral Daily     clopidogrel  75 mg Oral Daily     digoxin  125 mcg Oral Daily     gabapentin  600 mg Oral QPM     heparin lock flush  5-10 mL Intracatheter Q24H     metoprolol tartrate  12.5 mg Oral BID     sodium chloride (PF)  3 mL Intracatheter Q8H       Data     Most Recent 3 CBC's:  Recent Labs   Lab Test 05/25/20  0700 05/25/20  0150 05/24/20  0615  05/23/20  0423 05/22/20  0540   WBC  --   --  10.0  --  9.5 11.5*   HGB 8.8* 8.7* 8.8*   < > 8.6* 9.7*   MCV  --   --  95  --  94 93   PLT  --   --  241  --  219 224    < > = values in this interval not displayed.     Most Recent 3 BMP's:  Recent Labs   Lab Test 05/26/20  0645 05/25/20  1733 05/25/20  0700  05/24/20  0615  05/23/20  0423 05/22/20  0540   NA  --   --   --   --  139  --  143 140   POTASSIUM 3.3* 3.7 3.2*   < > 3.0*   < > 2.9* 3.0*   CHLORIDE  --   --   --   --  108  --  110* 109   CO2  --   --   --   --  27  --  27 26   BUN  --   --   --   --  4*  --  5* 6*   CR  --   --   --   --  0.61*  --  0.65* 0.66   ANIONGAP  --   --   --   --  4  --  6 5   ULISSES  --   --   --   --  7.7*  --  7.7* 8.0*   GLC  --   --   --   --  80  --  79 87    < > = values in this interval not displayed.     Most Recent 3 Troponin's:  Recent Labs   Lab Test 05/19/20  1736 05/19/20  1242 05/19/20  0850    TROPI 0.645* 0.692* 0.802*     Most Recent 3 BNP's:  Recent Labs   Lab Test 07/30/16  1555 07/27/16  1400   NTBNPI 3,617* 872     Most Recent Cholesterol Panel:  Recent Labs   Lab Test 06/24/19  1023   CHOL 118   LDL 51   HDL 51   TRIG 80

## 2020-05-26 NOTE — CONSULTS
Care Transition Initial Assessment -      Met with: Patient and Family  (wife)  Principal Problem:    Coronary artery disease involving native coronary artery of native heart without angina pectoris  Active Problems:    CAD (coronary artery disease)-moderate  nonobstructive 2 vessel    Status post coronary angiogram    Bleeding       DATA  Lives With: spouse   Living Arrangements: house(two story home )  Quality of Family Relationships: involved, supportive  Description of Support System: Supportive, Involved  Who is your support system?: Wife  Support Assessment: Adequate family and caregiver support.   Identified issues/concerns regarding health management:    Quality of Family Relationships: involved, supportive  Transportation Anticipated: family or friend will provide(Son does the driving)    Per social work consult for discharge planning and TCU placement on discharge.  Patient was admitted on 5-18-20 who was admitted after complications after an elective heart cath.  The tentative date of discharge is 5-27-20.  Reviewed chart and spoke with patient regarding discharge plans.  Per patient report, he lives with his wife in a 2 story house with steps to enter and steps inside the house.  Patient states that he typically does not use any assistive devices within the house, but he has a straight cane and a rolling walker and he uses them outside the house, depending on the distance.  Patient has a raised toilet seat and a shower chair in the bathroom.  Patient was independent with ADL's and some IADL's at baseline.  Patient's wife is not able to assist too much as she uses a walker and she is on oxygen.  Patient's son lives nearby and can assist as needed, including driving.  Reviewed the therapy discharge recommendations of tcu placement on discharge and patient's son is asking for me to contact his wife to discuss the discharge planning further.  Call placed to patient's wife Mariajose to discuss discharge  plans.  Per patient's wife's report, she is concerned about having patient go to a tcu due to the COVID virus.    Patient's wife also states that she does not feel that she can care for him.  She mainly stays upstairs and he mainly stays downstairs.  Patient's wife states that if patient was to go anywhere she would want him to go to [facility].  Patient's wife is asking for a referral to be sent and then she would like to talk to patient further about the discharge.  Referral sent, via discharge on the double, to check bed availability.   Received a call back from [facility], they have a bed available for patient tomorrow.   Call placed to update patient's wife so she can consider her options.  Also explained what homecare would look like and that it would not be 24 hour care.  Patient's wife states she understands.      ASSESSMENT  Cognitive Status:  awake and alert  Concerns to be addressed: discharge planning, possible TCU placement on discharge.     PLAN  Financial costs for the patient includes N/A.  Patient given options and choices for discharge TCu choices.  Patient/family is agreeable to the plan?  Yes  Transportation/person available to transport on day of discharge  is TBD and have they been notified/set up TBD  Patient Goals and Preferences: TBD .  Patient anticipates discharging to:  TBD.    Will continue to follow and assist with a safe discharge plan.      DAVIE Shell, [facility]  Lead   [phone]  [facility]

## 2020-05-26 NOTE — TELECONSULT
"BP 95/62   Pulse 94   Temp 98.4  F (36.9  C) (Oral)   Resp 20   Ht 1.702 m (5' 7\")   Wt 76.9 kg (169 lb 8 oz)   SpO2 90%   BMI 26.55 kg/m      I have now done a telephone consultation with this 74-year-old gentleman.  I have been asked to discuss the situation with the staff regarding some apparent difficulty emptying his bladder although he is voiding about 200 cc amounts but with residuals are between 202 150 cc.  No obvious previous urologic history.  He has been in the hospital here for treatment because of complications after elective heart catheterization with stent placement.  He has had persistent catheter site of bleeding with a hematoma in the groin area with hemorrhagic shock which is being addressed.  He has a history of cerebrovascular accident, atrial fibrillation on chronic anticoagulation with Eliquis, coronary artery disease, mitral stenosis, severe aortic stenosis.  Currently still in intensive care unit although improving with some considerations for discharge in the near future.    Patient is voiding, but has some difficulty standing because of hematoma in the groin.  I have recommended that he start Flomax 0.4 mg daily, that provided he is voiding satisfactorily he would not need catheterizing.  If in great distress and unable to void he will need a Bravo catheter placed.  I would recommend monitoring the situation this afternoon, if he continues to void well with residuals at or below the current level then he should be able to be discharged.  If there are concerns about this however placement of a Bravo catheter would be appropriate, he will go home with a catheter in for 1 week until some improvement in his overall situation and then return to the office for trial of voiding.  Please call me if concerns [phone]  Thank you  "

## 2020-05-26 NOTE — PROGRESS NOTES
[facility]    Medicine Progress Note - Hospitalist Service       Date of Admission:  5/18/2020  Assessment & Plan   Efrem Ramos is a 74 year old male with a past medical history of obstructive sleep apnea, hyperlipidemia, CVA, A. fib on chronic anticoagulation with Eliquis, coronary artery disease, mitral stenosis, and severe aortic stenosis who was admitted on 5/18/2020 following complications after an elective heart catheterization with stent placement and rotational artherectomy of RCA in anticipation of TAVR.  Postprocedure, patient was identified to have persistent cath site bleeding with development of massive hematoma and associated hemorrhagic shock.  Patient was identified to have active extravasation from the left common femoral artery. Hemostasis was achieved with ultrasound-guided direct pressure. He was resuscitated and admitted to the ICU with ongoing cares.  Once off of pressors patient transferred out of ICU/hospitalist contacted for resumption of care.    Postprocedure massive hematoma, left groin/scrotum  Hemorrhagic shock, resolved  Acute blood loss anemia  Postprocedure, developed acute hypotension with rapid development of groin/scrotal hematoma. In setting of chronic AC with Eliquis for afib, had been held for 3d in anticipation of procedure. Required initiation of massive transfusion protocol, pressor support.  - CTA A/P revealed active extravasation from left common femoral artery into left groin with extensive hemorrhage. Vascular surgery was urgently consulted, femstop repositioned and hemostasis achieved with direct ultrasound-guided pressure. Duplex ultrasound performed after direct pressure indicated resolution of active extravasation. Pt received a total of 4u PRBCs, last transfusion 5/21 for Hgb 7.8 and persistent hypotension.  Posttransfusion hemoglobin was 9.7, fluctuating between 8.6 and 9.8 since.   -No overt signs of bleeding, scrotal hematoma about the same    -Continue to monitor daily Hgb  - Transfuse PRN Hgb < 7  - Was on DAPT with ASA/Plavix given recent PCI.  This patient's hemoglobin has been stable for last several days cardiology started Eliquis and discontinued aspirin (5/25)  continue Plavix.  - Urology consulted given scrotal hematoma, scrotal support & elevation, PVRs have been wnl, so had signed off, this morning PVR was more than 200 cc and patient complained of pain with urination.  Urology reconsulted, started on Flomax and recommending to monitor.  If continues to have problems with voiding he would need a Bravo catheter before discharge.  -Patient used to have normal hemoglobin preprocedure, will check iron studies and if low iron sats will give IV iron     Chronic Afib with RVR  Hx afib on chronic AC with Eliquis. PTA maintained on metoprolol for rate control.   - episodic RVR with activity.  Heart rate bumps up up to 140-150 with minimal activity  -PTA metoprolol resumed at a lower dose 12.5 twice daily 5/24/2020.  Started on  digoxin temporarily for rate control during this hospitalization and heart rate continues to be elevated with minimal activity.    Cardiology following and adjusting his medication, appreciate input  - Holding PTA Eliquis as above     Hypoxia  -Patient started needing oxygen so IV fluid discontinued, currently on room air  -Monitor closely, incentive spirometry, early ambulation    CAD s/p PCIx4 to RCA (5/18/2020)  Hypertension  Hyperlipidemia  Pt with identified RCA disease on initial angiogram for TAVR workup performed 3/23/20, however due to uncertainty on repair approach (surgical v percutaneous) of aortic valve, RCA was not intervened upon at that time. CT TAVR was subsequently performed and indicated favorable anatomy for TAVR. Subsequently underwent angiogram 5/18/2020 with complex PCI, adjunctive rotational arthrectomy with DESx4 from proximal to distal RCA.  - Continues on DAPT with ASA/Plavix given recent  stent  -Restarted low-dose metoprolol 5/24/2020, dose adjustment per cardiology     Severe Aortic Stenosis  Undergoing workup for eventual outpatient TAVR.  -Patient should recover well from current episode prior to TAVR  - Cardiology following     MEL  - CPAP with home settings    Physical deconditioning        Diet: Regular Diet Adult    DVT Prophylaxis: Pneumatic Compression Devices  Bravo Catheter: not present  Code Status: Full Code           Disposition Plan   Expected discharge: 1 to 2 days, recommended to transitional care unit once hemoglobin stable, safe disposition plan/ TCU bed available and Heart rate better controlled and cleared by cardiology.  Entered: Emeli Reyes MD 05/26/2020, 2:38 PM       The patient's care was discussed with the Bedside Nurse and Patient.    Emeli Reyes MD  Hospitalist Service  [facility]    ______________________________________________________________________    Interval History   Patient reports feeling about the same.  Still extremely weak.  No new nursing concerns.  Heart rate goes up to 140 with minimal activity .  Continues to have groin pain and difficulty voiding.  Postvoid residual was more than 200.    Data reviewed today: I reviewed all medications, new labs and imaging results over the last 24 hours. I personally reviewed the EKG tracing showing Atrial fibrillation.    Physical Exam   Vital Signs: Temp: 98.4  F (36.9  C) Temp src: Oral BP: 95/62 Pulse: 94 Heart Rate: 89 Resp: 20 SpO2: 90 % O2 Device: None (Room air) Oxygen Delivery: 1 LPM  Weight: 169 lbs 8 oz  Exam:  Constitutional: Awake, alert and no distress. Appears comfortable  Head: Normocephalic. No masses, lesions, tenderness or abnormalities  ENT: ENT exam normal, no neck nodes or sinus tenderness  Cardiovascular: Tachycardic, irregular, 3+ murmurs, no rubs or JVD  Respiratory: Normal WOB,b/l equal air entry, no wheezes or crackles   Gastrointestinal: Abdomen soft, non-tender. BS  normal. No masses, organomegaly  : Hematoma in the scrotum appears stable  Extremities : No edema , no clubbing or cyanosis        Data   Recent Labs   Lab 05/26/20  1359 05/26/20  0645 05/25/20  1733 05/25/20  0700 05/25/20  0150  05/24/20  0615  05/23/20  0423 05/22/20  0540  05/21/20  0530  05/19/20  1736   WBC  --   --   --   --   --   --  10.0  --  9.5 11.5*   < >  --    < >  --    HGB  --   --   --  8.8* 8.7*  --  8.8*   < > 8.6* 9.7*   < >  --    < > 9.0*   MCV  --   --   --   --   --   --  95  --  94 93   < >  --    < >  --    PLT  --   --   --   --   --   --  241  --  219 224   < >  --    < >  --    NA  --   --   --   --   --   --  139  --  143 140  --  140   < >  --    POTASSIUM 3.8 3.3* 3.7 3.2*  --    < > 3.0*   < > 2.9* 3.0*  --  3.6   < >  --    CHLORIDE  --   --   --   --   --   --  108  --  110* 109  --  110*   < >  --    CO2  --   --   --   --   --   --  27  --  27 26  --  26   < >  --    BUN  --   --   --   --   --   --  4*  --  5* 6*  --  7   < >  --    CR  --   --   --   --   --   --  0.61*  --  0.65* 0.66  --  0.67   < >  --    ANIONGAP  --   --   --   --   --   --  4  --  6 5  --  4   < >  --    ULISSES  --   --   --   --   --   --  7.7*  --  7.7* 8.0*  --  8.0*   < >  --    GLC  --   --   --   --   --   --  80  --  79 87  --  94   < >  --    ALBUMIN  --   --   --   --   --   --   --   --   --   --   --  2.2*  --   --    TROPI  --   --   --   --   --   --   --   --   --   --   --   --   --  0.645*    < > = values in this interval not displayed.     No results found for this or any previous visit (from the past 24 hour(s)).  Medications     - MEDICATION INSTRUCTIONS -       - MEDICATION INSTRUCTIONS -       - MEDICATION INSTRUCTIONS -       - MEDICATION INSTRUCTIONS -       Percutaneous Coronary Intervention orders placed (this is information for BPA alerting)       ACE/ARB/ARNI NOT PRESCRIBED         apixaban ANTICOAGULANT  5 mg Oral BID     atorvastatin  40 mg Oral Daily     clopidogrel  75 mg  Oral Daily     digoxin  125 mcg Oral Daily     gabapentin  600 mg Oral QPM     heparin lock flush  5-10 mL Intracatheter Q24H     metoprolol tartrate  12.5 mg Oral BID     sodium chloride (PF)  3 mL Intracatheter Q8H     tamsulosin  0.4 mg Oral Daily

## 2020-05-26 NOTE — PLAN OF CARE
Patient is up un the room with walker gait belt and 1 assist. Up in the chair with meals, patient has hard time swallowing it is not new. Voiding in the urinal post void residuals 200, urology started on Flomax. Gave Tylenol for groin pain. It is very painful to move because off swollen ecchymotic groin.

## 2020-05-26 NOTE — PLAN OF CARE
Neuro- A&O, forgetful  Most Recent Vitals- Temp: 98.4  F (36.9  C) Temp src: Oral BP: 105/77 Pulse: 104 Heart Rate: 94 Resp: 20 SpO2: 99 % O2 Device: Nasal cannula Oxygen Delivery: 2 LPM  Tele - AFib RVR-metoprolol restarted 5/24, continues on dig  Cardiac- No chest pain reported   Resp- LS clear, some shortness of breath reported-not a change  O2- 2L for comfort   Activity- Ax2 to BSC  Pain- Scrotal pain related to edema and hematoma   Drips- SL  Drains/Tubes- Right IJ  Aggression Color- Green  Plan- Awaiting safe discharge plan   Misc- Patient is severely deconditioned, needs encouragement.  Per discussion with previous day shift RN, will get daily weight when patient gets up for breakfast.    Twila Sharp, RN

## 2020-05-27 ENCOUNTER — APPOINTMENT (OUTPATIENT)
Dept: OCCUPATIONAL THERAPY | Facility: CLINIC | Age: 75
DRG: 246 | End: 2020-05-27
Payer: MEDICARE

## 2020-05-27 ENCOUNTER — APPOINTMENT (OUTPATIENT)
Dept: CT IMAGING | Facility: CLINIC | Age: 75
DRG: 246 | End: 2020-05-27
Attending: NURSE PRACTITIONER
Payer: MEDICARE

## 2020-05-27 LAB
HGB BLD-MCNC: 8.3 G/DL (ref 13.3–17.7)
IRON SATN MFR SERPL: 14 % (ref 15–46)
IRON SERPL-MCNC: 26 UG/DL (ref 35–180)
MAGNESIUM SERPL-MCNC: 2.4 MG/DL (ref 1.6–2.3)
POTASSIUM SERPL-SCNC: 3.4 MMOL/L (ref 3.4–5.3)
TIBC SERPL-MCNC: 191 UG/DL (ref 240–430)

## 2020-05-27 PROCEDURE — 25000132 ZZH RX MED GY IP 250 OP 250 PS 637: Mod: GY | Performed by: INTERNAL MEDICINE

## 2020-05-27 PROCEDURE — 83735 ASSAY OF MAGNESIUM: CPT | Performed by: INTERNAL MEDICINE

## 2020-05-27 PROCEDURE — 25000132 ZZH RX MED GY IP 250 OP 250 PS 637: Mod: GY | Performed by: UROLOGY

## 2020-05-27 PROCEDURE — 25000132 ZZH RX MED GY IP 250 OP 250 PS 637: Mod: GY | Performed by: PHYSICIAN ASSISTANT

## 2020-05-27 PROCEDURE — 21000001 ZZH R&B HEART CARE

## 2020-05-27 PROCEDURE — 85018 HEMOGLOBIN: CPT | Performed by: INTERNAL MEDICINE

## 2020-05-27 PROCEDURE — 72191 CT ANGIOGRAPH PELV W/O&W/DYE: CPT

## 2020-05-27 PROCEDURE — 99233 SBSQ HOSP IP/OBS HIGH 50: CPT | Performed by: SURGERY

## 2020-05-27 PROCEDURE — 83550 IRON BINDING TEST: CPT | Performed by: INTERNAL MEDICINE

## 2020-05-27 PROCEDURE — 25000128 H RX IP 250 OP 636: Performed by: INTERNAL MEDICINE

## 2020-05-27 PROCEDURE — 99233 SBSQ HOSP IP/OBS HIGH 50: CPT | Performed by: HOSPITALIST

## 2020-05-27 PROCEDURE — 97535 SELF CARE MNGMENT TRAINING: CPT | Mod: GO

## 2020-05-27 PROCEDURE — 83540 ASSAY OF IRON: CPT | Performed by: INTERNAL MEDICINE

## 2020-05-27 PROCEDURE — 99233 SBSQ HOSP IP/OBS HIGH 50: CPT | Performed by: INTERNAL MEDICINE

## 2020-05-27 PROCEDURE — 84132 ASSAY OF SERUM POTASSIUM: CPT | Performed by: INTERNAL MEDICINE

## 2020-05-27 PROCEDURE — 25000128 H RX IP 250 OP 636

## 2020-05-27 PROCEDURE — 25000125 ZZHC RX 250

## 2020-05-27 RX ORDER — IOPAMIDOL 755 MG/ML
80 INJECTION, SOLUTION INTRAVASCULAR ONCE
Status: COMPLETED | OUTPATIENT
Start: 2020-05-27 | End: 2020-05-27

## 2020-05-27 RX ORDER — OXYCODONE HYDROCHLORIDE 5 MG/1
5 TABLET ORAL EVERY 8 HOURS PRN
Status: DISCONTINUED | OUTPATIENT
Start: 2020-05-27 | End: 2020-05-30

## 2020-05-27 RX ADMIN — DIGOXIN 125 MCG: 125 TABLET ORAL at 08:29

## 2020-05-27 RX ADMIN — ATORVASTATIN CALCIUM 40 MG: 40 TABLET, FILM COATED ORAL at 08:29

## 2020-05-27 RX ADMIN — POTASSIUM CHLORIDE 20 MEQ: 1.5 POWDER, FOR SOLUTION ORAL at 08:35

## 2020-05-27 RX ADMIN — IOPAMIDOL 80 ML: 755 INJECTION, SOLUTION INTRAVENOUS at 09:17

## 2020-05-27 RX ADMIN — TAMSULOSIN HYDROCHLORIDE 0.4 MG: 0.4 CAPSULE ORAL at 08:29

## 2020-05-27 RX ADMIN — SODIUM CHLORIDE 80 ML: 9 INJECTION, SOLUTION INTRAVENOUS at 09:18

## 2020-05-27 RX ADMIN — SODIUM CHLORIDE, PRESERVATIVE FREE 5 ML: 5 INJECTION INTRAVENOUS at 06:54

## 2020-05-27 RX ADMIN — APIXABAN 5 MG: 5 TABLET, FILM COATED ORAL at 22:37

## 2020-05-27 RX ADMIN — METOPROLOL TARTRATE 12.5 MG: 25 TABLET, FILM COATED ORAL at 09:42

## 2020-05-27 RX ADMIN — METOPROLOL TARTRATE 12.5 MG: 25 TABLET, FILM COATED ORAL at 22:37

## 2020-05-27 RX ADMIN — ACETAMINOPHEN 650 MG: 325 TABLET, FILM COATED ORAL at 09:42

## 2020-05-27 RX ADMIN — CLOPIDOGREL BISULFATE 75 MG: 75 TABLET, FILM COATED ORAL at 08:29

## 2020-05-27 RX ADMIN — ACETAMINOPHEN 650 MG: 325 TABLET, FILM COATED ORAL at 04:32

## 2020-05-27 RX ADMIN — APIXABAN 5 MG: 5 TABLET, FILM COATED ORAL at 08:29

## 2020-05-27 RX ADMIN — ACETAMINOPHEN 650 MG: 325 TABLET, FILM COATED ORAL at 22:37

## 2020-05-27 RX ADMIN — GABAPENTIN 600 MG: 300 CAPSULE ORAL at 22:37

## 2020-05-27 ASSESSMENT — MIFFLIN-ST. JEOR: SCORE: 1485.6

## 2020-05-27 NOTE — PROGRESS NOTES
[facility]  Cardiology Progress Note  Date of Service: 05/27/2020  Primary Cardiologist: Dr. Xiong     Assessment & Plan    Efrem Ramos is a 74 year old male with past medical history significant for MEL, PVD, HLD, CVA 2015, atrial fibrillation, severe aortic stenosis and mild to moderate mitral valve stenosis admitted on 5/18/2020. He underwent and elective cardiac catheterization and RCA intervention prior to TAVR. Post procedurally he developed persistent bleeding from his left femoral access site and subsequent hemorrhagic shock.     Assessment:   1. Hemorrhagic shock secondary to left femoral artery prolonged bleeding post coronary angiogram.    Complicated by severe AS    CT scan showed active extravasation of the common iliac vs prox common femoral. Vascular surgery consulted and FemStop was repositioned based on US.     Initially requiring NorEpi and now weaned off pressor support as of 5/21.    Hemoglobin dropped to 7.8 and improved to 9.7 post transfusion of 4 units (12.1 prior to procedure).    Transfuse for hgb <7    Hemoglobin remains stable after first dose of Eliquis, but continues to drift down since transfusion 8.3 today.    Metoprolol restarted 5/24 BP soft, but stable    Iron studies Iron 26 (L), iron binding cap 191 (L), Iron Sat. Index 14 (L)    2. Coronary artery disease    Status post PCI with adjunctive rotational atherectomy with drug-eluting stents x4 from proximal to distal RCA on 5/18/2020    ASA+Plavix+Eliquis x 1 week.     Now on Plavix + Eliquis as of 5/25    3. Severe aortic stenosis    Awaiting TAVR    4. Chronic atrial fibrillation with history of CVA    Eliquis was initially held due to bleed, but was restarted last evening 5/25    Episodes of RVR with activity. Resumed metoprolol 12.5 mg BID on 5/24 and started on temporary digoxin.     Plan:  1. Repeat CT of abdomen and pelvis for reassessment of hematoma given ongoing pain.     ALEJANDRA KEARNEY, FABI  CNP  Pager:  [phone]   (7am - 5pm, M-F)    Interval History   Continues to have elevated HRs with activity with drop in BP. Patient reports ongoing pain from hematoma.     Physical Exam   Temp: 98.7  F (37.1  C) Temp src: Oral BP: 96/69 Pulse: 105 Heart Rate: 88 Resp: 20 SpO2: 91 % O2 Device: None (Room air) Oxygen Delivery: 1 LPM  Vitals:    05/25/20 0544 05/26/20 0912 05/27/20 0500   Weight: 78.5 kg (173 lb) 76.9 kg (169 lb 8 oz) 79.7 kg (175 lb 11.2 oz)       Constitutional:   NAD   Skin:   Warm and dry   Head:   Nontraumatic   Neck:   no JVD   Lungs:   symmetric, clear to auscultation   Cardiovascular:   irregularly irregular rhythm and 2/6 PRAFUL    Abdomen:   Benign   Extremities and Back:   Left groin site with diffuse hematoma with palpable pulses   Neurological:   Grossly nonfocal       Medications     - MEDICATION INSTRUCTIONS -       - MEDICATION INSTRUCTIONS -       - MEDICATION INSTRUCTIONS -       - MEDICATION INSTRUCTIONS -       Percutaneous Coronary Intervention orders placed (this is information for BPA alerting)       ACE/ARB/ARNI NOT PRESCRIBED         apixaban ANTICOAGULANT  5 mg Oral BID     atorvastatin  40 mg Oral Daily     clopidogrel  75 mg Oral Daily     digoxin  125 mcg Oral Daily     gabapentin  600 mg Oral QPM     heparin lock flush  5-10 mL Intracatheter Q24H     metoprolol tartrate  12.5 mg Oral BID     sodium chloride (PF)  3 mL Intracatheter Q8H     tamsulosin  0.4 mg Oral Daily       Data     Most Recent 3 CBC's:  Recent Labs   Lab Test 05/27/20  0645 05/25/20  0700 05/25/20  0150 05/24/20  0615  05/23/20  0423 05/22/20  0540   WBC  --   --   --  10.0  --  9.5 11.5*   HGB 8.3* 8.8* 8.7* 8.8*   < > 8.6* 9.7*   MCV  --   --   --  95  --  94 93   PLT  --   --   --  241  --  219 224    < > = values in this interval not displayed.     Most Recent 3 BMP's:  Recent Labs   Lab Test 05/27/20  0645 05/26/20  1359 05/26/20  0645  05/24/20  0615  05/23/20  0423 05/22/20  0540   NA  --   --    --   --  139  --  143 140   POTASSIUM 3.4 3.8 3.3*   < > 3.0*   < > 2.9* 3.0*   CHLORIDE  --   --   --   --  108  --  110* 109   CO2  --   --   --   --  27  --  27 26   BUN  --   --   --   --  4*  --  5* 6*   CR  --   --   --   --  0.61*  --  0.65* 0.66   ANIONGAP  --   --   --   --  4  --  6 5   ULISSES  --   --   --   --  7.7*  --  7.7* 8.0*   GLC  --   --   --   --  80  --  79 87    < > = values in this interval not displayed.     Most Recent 3 Troponin's:  Recent Labs   Lab Test 05/19/20  1736 05/19/20  1242 05/19/20  0850   TROPI 0.645* 0.692* 0.802*     Most Recent 3 BNP's:  Recent Labs   Lab Test 07/30/16  1555 07/27/16  1400   NTBNPI 3,617* 872     Most Recent Cholesterol Panel:  Recent Labs   Lab Test 06/24/19  1023   CHOL 118   LDL 51   HDL 51   TRIG 80

## 2020-05-27 NOTE — PLAN OF CARE
VSS, groin sites stable, ice and elevation to scrotum frequently. BP soft, was able to get meds on day shift after re-checks. Urine retention seems improved on flomax. Checking PVRs. Up with 1 assist.

## 2020-05-27 NOTE — PROGRESS NOTES
SW:  D:  Call placed to update patient's wife as to patient's status.    P:  Will continue to follow.      DAVIE Shell, [facility]  Lead   [phone]  [facility]

## 2020-05-27 NOTE — PROGRESS NOTES
[facility]    Medicine Progress Note - Hospitalist Service       Date of Admission:  5/18/2020  Assessment & Plan   Efrem Ramos is a 74 year old male with a past medical history of obstructive sleep apnea, hyperlipidemia, CVA, A. fib on chronic anticoagulation with Eliquis, coronary artery disease, mitral stenosis, and severe aortic stenosis who was admitted on 5/18/2020 following complications after an elective heart catheterization with stent placement and rotational artherectomy of RCA in anticipation of TAVR.  Postprocedure, patient was identified to have persistent cath site bleeding with development of massive hematoma and associated hemorrhagic shock.  Patient was identified to have active extravasation from the left common femoral artery. Hemostasis was achieved with ultrasound-guided direct pressure. He was resuscitated and admitted to the ICU with ongoing cares.  Once off of pressors patient transferred out of ICU/hospitalist contacted for resumption of care.    Postprocedure massive hematoma, left groin/scrotum  Hemorrhagic shock, resolved  Acute blood loss anemia  Postprocedure, developed acute hypotension with rapid development of groin/scrotal hematoma. In setting of chronic AC with Eliquis for afib, had been held for 3d in anticipation of procedure. Required initiation of massive transfusion protocol, pressor support.  - CTA A/P revealed active extravasation from left common femoral artery into left groin with extensive hemorrhage. Vascular surgery was urgently consulted, femstop repositioned and hemostasis achieved with direct ultrasound-guided pressure. Duplex ultrasound performed after direct pressure indicated resolution of active extravasation. Pt received a total of 4u PRBCs, last transfusion 5/21 for Hgb 7.8 and persistent hypotension.  Posttransfusion hemoglobin was 9.7, fluctuating between 8.6 and 9.8 since.  Today 8.3.  -Continue to monitor daily Hgb  - Transfuse PRN Hgb  < 7  - Was on DAPT with ASA/Plavix given recent PCI.  This patient's hemoglobin has been stable for last several days cardiology started Eliquis and discontinued aspirin (5/25)  continue Plavix.  Drift down in hemoglobin, persistent pain.  Repeat CT.  - Urology consulted given scrotal hematoma, scrotal support & elevation, PVRs have been wnl, Urology reconsulted, started on Flomax and recommending to monitor  -Patient used to have normal hemoglobin preprocedure, will check iron studies and if low iron sats will give IV iron  -Increasing pain, appreciate general surgery consultation who recommended conservative management/local care.  Close monitor serial hemoglobin..  Start PRN oxycodone, close monitor for any excess sedation on narcotics, monitor bowels.  Serial monitor hemoglobin     Chronic Afib with RVR  Hx afib on chronic AC with Eliquis. PTA maintained on metoprolol for rate control.   - episodic RVR with activity.  Heart rate bumps up up to 140-150 with minimal activity secondary to severe aortic stenosis.  -PTA metoprolol resumed at a lower dose 12.5 twice daily 5/24/2020.  Started on  digoxin temporarily for rate control during this hospitalization and heart rate continues to be elevated with minimal activity.    Cardiology following and adjusting his medication, appreciate input  -Eliquis restarted by Cardiology, close monitor.     Hypoxia  -Patient started needing oxygen;  IV fluid discontinued, currently on room air  -Monitor closely, incentive spirometry, early ambulation    CAD s/p PCIx4 to RCA (5/18/2020)  Hypertension  Hyperlipidemia  Pt with identified RCA disease on initial angiogram for TAVR workup performed 3/23/20, however due to uncertainty on repair approach (surgical v percutaneous) of aortic valve, RCA was not intervened upon at that time. CT TAVR was subsequently performed and indicated favorable anatomy for TAVR. Subsequently underwent angiogram 5/18/2020 with complex PCI, adjunctive  rotational arthrectomy with DESx4 from proximal to distal RCA.  - Continues on DAPT with ASA/Plavix given recent stent  -Restarted low-dose metoprolol 5/24/2020, dose adjustment per cardiology     Severe Aortic Stenosis  Undergoing workup for eventual outpatient TAVR.  -Patient should recover well from current episode prior to TAVR  - Cardiology following     MEL  - CPAP with home settings    Physical deconditioning  -OT PT.        Diet: Regular Diet Adult  Snacks/Supplements Adult: Boost Plus; Between Meals    DVT Prophylaxis: Pneumatic Compression Devices  Bravo Catheter: not present  Code Status: Full Code           Disposition Plan   Expected discharge: 1 to 2 days, recommended to transitional care unit pending status of hematoma, scrotal edema, pain management  Entered: Larisa Carrillo MD 05/27/2020, 4:20 PM         Larisa Carrillo MD  Hospitalist Service  [facility]    ______________________________________________________________________    Interval History   Patient reports persistent discomfort in scrotal region, making urination difficult although he is able to urinate.  Nursing reports no elevated PVRs.  Eating, bowels moving.  Nursing also reports pain refractory to Tylenol.    Data reviewed today: I reviewed all medications, new labs and imaging results over the last 24 hours.    Physical Exam   Vital Signs: Temp: 98.9  F (37.2  C) Temp src: Oral BP: 94/58 Pulse: 111 Heart Rate: 114 Resp: 16 SpO2: 98 % O2 Device: None (Room air)    Weight: 175 lbs 11.2 oz      Constitutional: NAD, alert, calm, cooperative.  Head: Atraumatic no masses, lesions, tenderness or abnormalities  ENT: Buccal mucosa moist.  Cardiovascular: Irregular, 2/6 murmur.  Respiratory: Air, no rales or wheeze.  Respirations nonlabored.  Gastrointestinal: Abdomen soft, non-tender.  No rebound guarding or other peritoneal signs.  : Extensive ecchymoses across lower abdomen, suprapubic region.  2/4 scrotal edema  bilaterally.  Extremities : No edema , no clubbing or cyanosis    Neurologic, gross motor testing intact, nonfocal  Psych: Affect calm.      Data   Recent Labs   Lab 05/27/20  0645 05/26/20  1359 05/26/20  0645  05/25/20  0700 05/25/20  0150  05/24/20  0615  05/23/20  0423 05/22/20  0540  05/21/20  0530   WBC  --   --   --   --   --   --   --  10.0  --  9.5 11.5*   < >  --    HGB 8.3*  --   --   --  8.8* 8.7*  --  8.8*   < > 8.6* 9.7*   < >  --    MCV  --   --   --   --   --   --   --  95  --  94 93   < >  --    PLT  --   --   --   --   --   --   --  241  --  219 224   < >  --    NA  --   --   --   --   --   --   --  139  --  143 140  --  140   POTASSIUM 3.4 3.8 3.3*   < > 3.2*  --    < > 3.0*   < > 2.9* 3.0*  --  3.6   CHLORIDE  --   --   --   --   --   --   --  108  --  110* 109  --  110*   CO2  --   --   --   --   --   --   --  27  --  27 26  --  26   BUN  --   --   --   --   --   --   --  4*  --  5* 6*  --  7   CR  --   --   --   --   --   --   --  0.61*  --  0.65* 0.66  --  0.67   ANIONGAP  --   --   --   --   --   --   --  4  --  6 5  --  4   ULISSES  --   --   --   --   --   --   --  7.7*  --  7.7* 8.0*  --  8.0*   GLC  --   --   --   --   --   --   --  80  --  79 87  --  94   ALBUMIN  --   --   --   --   --   --   --   --   --   --   --   --  2.2*    < > = values in this interval not displayed.     Recent Results (from the past 24 hour(s))   CT Pelvis Angio w/o & w Contrast    Narrative    CTA ANGIOGRAM PELVIS  5/27/2020 10:28 AM     HISTORY: Reassess hematoma post coronary artery angiogram.    COMPARISON: CT angiogram dated 5/18/2020    TECHNIQUE: CT angiogram of the pelvis was performed following the  administration of 80 mL intravenous contrast. Images are reviewed in  multiple planes and 3-D reconstructions were also performed. Radiation  dose for this scan was reduced using automated exposure control,  adjustment of the mA and/or kV according to patient size, or iterative  reconstruction  technique.    FINDINGS:   Vascular examination: Previously seen left femoral pseudoaneurysm has  resolved. There is a residual hematoma in the left inguinal region  extending into the left scrotum. This has decreased in size. The  inguinal component measures approximately 5.6 x 3.8 cm in maximum  craniocaudad and transverse dimensions. The scrotal component measures  approximately 11.6 x 5.3 cm in maximum craniocaudal and transverse  dimensions.    Again identified is scattered atherosclerotic plaque in the abdominal  aorta iliac arteries and proximal femoral arteries. The proximal/mid  right superficial femoral artery is occluded.    There is bladder wall thickening and small bladder diverticuli.      Impression    IMPRESSION: Interval resolution of the previously seen left femoral  pseudoaneurysm. Residual hematomas in the left inguinal region and  scrotum are decreased in size.    OMAIRA WILKINS MD     Medications     - MEDICATION INSTRUCTIONS -       - MEDICATION INSTRUCTIONS -       - MEDICATION INSTRUCTIONS -       - MEDICATION INSTRUCTIONS -       Percutaneous Coronary Intervention orders placed (this is information for BPA alerting)       ACE/ARB/ARNI NOT PRESCRIBED         apixaban ANTICOAGULANT  5 mg Oral BID     atorvastatin  40 mg Oral Daily     clopidogrel  75 mg Oral Daily     digoxin  125 mcg Oral Daily     gabapentin  600 mg Oral QPM     heparin lock flush  5-10 mL Intracatheter Q24H     metoprolol tartrate  12.5 mg Oral BID     sodium chloride (PF)  3 mL Intracatheter Q8H     tamsulosin  0.4 mg Oral Daily       Discussed with Nursing and Patient today

## 2020-05-27 NOTE — PLAN OF CARE
"Discharge Planner OT   Patient plan for discharge: now agreeable to TCU    Current status:  CGA for sit <> stand transfer with  FWW, HR up to 150's with activity. Pt ambulated to bathroom with CGA and FWW. CGA for toilet transfer. Pt able to perform pericares with SBA. Attempted to have pt stand at sink for ADL tasks however unable to due to feeling \"shaky,\" pt completed g/h tasks with encouragement and set- up A for sequencing. Pt reports feeling low energy and unable to participate in further ax. CGA for sit > supine. BP 80/61,  in supine. RN updated.     Barriers to return to prior living situation: medical status, impaired functional act tolerance,scrotal edema and pain    Recommendations for discharge: TCU    Rationale for recommendations:  Pt would benefit from continued skilled OT services to improve independence and safety with ADL's and functional transfers as pt is not at baseline.  Pt does not want to go to TCU, if home will require 24 hr S and A with ADL/IADL's and functional mobility at all times, use of FWW and home RN, OT and PT.        Entered by: Kendal Nolasco 05/27/2020 10:45 AM       "

## 2020-05-27 NOTE — PLAN OF CARE
Neuro- A&O, forgetful  Most Recent Vitals- Temp: 98.7  F (37.1  C) Temp src: Oral BP: 96/69 Pulse: 105 Heart Rate: 88 Resp: 20 SpO2: 91 % O2 Device: None (Room air) Oxygen Delivery: 1 LPM  Tele - AFib CVR  Cardiac- No chest pain reported, murmur noted   Resp- LS clear, shortness of breath with ambulation  O2- RA  Activity- Ax2 with walker and gait belt   Pain- Tylenol given for groin/scrotal pain  Drips- SL  Drains/Tubes- Right IJ  Aggression Color- Green  Plan- [place] 5/27  [facility] [place]     Twila Sharp RN

## 2020-05-27 NOTE — PLAN OF CARE
Patient is very weak , do not want to have lunch, blood pressure 80 systolic. Cardiology saw patient vascular aware of blood pressure and groin pain, Vascular surgery saw ice packs on the groin area, elevation.

## 2020-05-27 NOTE — PROGRESS NOTES
"BP (!) 80/53   Pulse 111   Temp 97.7  F (36.5  C) (Oral)   Resp 16   Ht 1.702 m (5' 7\")   Wt 79.7 kg (175 lb 11.2 oz)   SpO2 98%   BMI 27.52 kg/m      Follow-up visit with this pleasant 74-year-old gentleman who was having voiding difficulties following elective heart catheterization and stent placement with who developed a significant groin hematoma.  As noted he has a history of cerebrovascular accidents, atrial fibrillation on chronic anticoagulation with Eliquis, coronary artery disease mitral stenosis and severe aortic stenosis.  We will concern yesterday that he still was getting significant postvoid residuals in excess of 200 cc and we started Flomax 0.4 mg daily.  Today the post void residual volumes have declined and I certainly no more than 150 cc and he seems to be voiding a little more satisfactorily.  My current recommendation therefore is that he should continue on Flomax during his convalescence.  I would recommend a follow-up virtual visit in about 4 weeks time unless he is having increasing difficulty voiding again.  If there are concerns about his ability to void the next step would be a cystoscopy to determine if there is evidence of intravesical obstruction.  .  I discussed this is the situation in detail today.  He is convalescing here for the time being.  I will be happy to see him again in the hospital if there are concerns otherwise we will look forward to communicating with him in a video consultation.  "

## 2020-05-28 ENCOUNTER — SURGERY (OUTPATIENT)
Age: 75
End: 2020-05-28
Payer: MEDICARE

## 2020-05-28 ENCOUNTER — APPOINTMENT (OUTPATIENT)
Dept: OCCUPATIONAL THERAPY | Facility: CLINIC | Age: 75
DRG: 246 | End: 2020-05-28
Payer: MEDICARE

## 2020-05-28 ENCOUNTER — APPOINTMENT (OUTPATIENT)
Dept: CARDIOLOGY | Facility: CLINIC | Age: 75
DRG: 246 | End: 2020-05-28
Attending: NURSE PRACTITIONER
Payer: MEDICARE

## 2020-05-28 PROBLEM — I35.0 NONRHEUMATIC AORTIC VALVE STENOSIS: Status: ACTIVE | Noted: 2020-05-08

## 2020-05-28 PROBLEM — R57.0 CARDIOGENIC SHOCK (H): Status: ACTIVE | Noted: 2020-05-08

## 2020-05-28 LAB
ALBUMIN SERPL-MCNC: 2.1 G/DL (ref 3.4–5)
ALBUMIN UR-MCNC: 10 MG/DL
ALP SERPL-CCNC: 88 U/L (ref 40–150)
ALT SERPL W P-5'-P-CCNC: 15 U/L (ref 0–70)
ANION GAP SERPL CALCULATED.3IONS-SCNC: 4 MMOL/L (ref 3–14)
ANISOCYTOSIS BLD QL SMEAR: SLIGHT
APPEARANCE UR: CLEAR
AST SERPL W P-5'-P-CCNC: 27 U/L (ref 0–45)
BASOPHILS # BLD AUTO: 0.1 10E9/L (ref 0–0.2)
BASOPHILS NFR BLD AUTO: 0.9 %
BILIRUB DIRECT SERPL-MCNC: 0.3 MG/DL (ref 0–0.2)
BILIRUB SERPL-MCNC: 1.3 MG/DL (ref 0.2–1.3)
BILIRUB UR QL STRIP: NEGATIVE
BUN SERPL-MCNC: 8 MG/DL (ref 7–30)
BURR CELLS BLD QL SMEAR: SLIGHT
CALCIUM SERPL-MCNC: 7.4 MG/DL (ref 8.5–10.1)
CHLORIDE SERPL-SCNC: 110 MMOL/L (ref 94–109)
CO2 SERPL-SCNC: 24 MMOL/L (ref 20–32)
COLOR UR AUTO: YELLOW
CREAT SERPL-MCNC: 0.65 MG/DL (ref 0.66–1.25)
DIFFERENTIAL METHOD BLD: ABNORMAL
EOSINOPHIL # BLD AUTO: 0.6 10E9/L (ref 0–0.7)
EOSINOPHIL NFR BLD AUTO: 5 %
ERYTHROCYTE [DISTWIDTH] IN BLOOD BY AUTOMATED COUNT: 17.2 % (ref 10–15)
GFR SERPL CREATININE-BSD FRML MDRD: >90 ML/MIN/{1.73_M2}
GLUCOSE SERPL-MCNC: 85 MG/DL (ref 70–99)
GLUCOSE UR STRIP-MCNC: NEGATIVE MG/DL
HCT VFR BLD AUTO: 27.6 % (ref 40–53)
HGB BLD-MCNC: 9 G/DL (ref 13.3–17.7)
HGB BLD-MCNC: 9.1 G/DL (ref 13.3–17.7)
HGB UR QL STRIP: NEGATIVE
HYALINE CASTS #/AREA URNS LPF: 3 /LPF (ref 0–2)
IMM GRANULOCYTES # BLD: 0.1 10E9/L (ref 0–0.4)
IMM GRANULOCYTES NFR BLD: 0.8 %
KETONES UR STRIP-MCNC: NEGATIVE MG/DL
LACTATE BLD-SCNC: 1.6 MMOL/L (ref 0.7–2)
LACTATE BLD-SCNC: 3.3 MMOL/L (ref 0.7–2)
LEUKOCYTE ESTERASE UR QL STRIP: NEGATIVE
LYMPHOCYTES # BLD AUTO: 2 10E9/L (ref 0.8–5.3)
LYMPHOCYTES NFR BLD AUTO: 16.9 %
MCH RBC QN AUTO: 31.7 PG (ref 26.5–33)
MCHC RBC AUTO-ENTMCNC: 32.6 G/DL (ref 31.5–36.5)
MCV RBC AUTO: 97 FL (ref 78–100)
MONOCYTES # BLD AUTO: 1.3 10E9/L (ref 0–1.3)
MONOCYTES NFR BLD AUTO: 10.8 %
MUCOUS THREADS #/AREA URNS LPF: PRESENT /LPF
NEUTROPHILS # BLD AUTO: 7.8 10E9/L (ref 1.6–8.3)
NEUTROPHILS NFR BLD AUTO: 65.6 %
NITRATE UR QL: NEGATIVE
NRBC # BLD AUTO: 0 10*3/UL
NRBC BLD AUTO-RTO: 0 /100
PH UR STRIP: 7.5 PH (ref 5–7)
PLATELET # BLD AUTO: 353 10E9/L (ref 150–450)
PLATELET # BLD EST: ABNORMAL 10*3/UL
POLYCHROMASIA BLD QL SMEAR: SLIGHT
POTASSIUM SERPL-SCNC: 3.4 MMOL/L (ref 3.4–5.3)
PROT SERPL-MCNC: 6.3 G/DL (ref 6.8–8.8)
RBC # BLD AUTO: 2.84 10E12/L (ref 4.4–5.9)
RBC #/AREA URNS AUTO: <1 /HPF (ref 0–2)
SARS-COV-2 RNA SPEC QL NAA+PROBE: NORMAL
SODIUM SERPL-SCNC: 138 MMOL/L (ref 133–144)
SOURCE: ABNORMAL
SP GR UR STRIP: 1.02 (ref 1–1.03)
SPECIMEN SOURCE: NORMAL
UROBILINOGEN UR STRIP-MCNC: NORMAL MG/DL (ref 0–2)
WBC # BLD AUTO: 12.4 10E9/L (ref 4–11)
WBC #/AREA URNS AUTO: 1 /HPF (ref 0–5)

## 2020-05-28 PROCEDURE — 25000125 ZZHC RX 250: Performed by: INTERNAL MEDICINE

## 2020-05-28 PROCEDURE — 83605 ASSAY OF LACTIC ACID: CPT | Performed by: HOSPITALIST

## 2020-05-28 PROCEDURE — 97535 SELF CARE MNGMENT TRAINING: CPT | Mod: GO

## 2020-05-28 PROCEDURE — 25000128 H RX IP 250 OP 636: Performed by: INTERNAL MEDICINE

## 2020-05-28 PROCEDURE — 99233 SBSQ HOSP IP/OBS HIGH 50: CPT | Performed by: HOSPITALIST

## 2020-05-28 PROCEDURE — 25000132 ZZH RX MED GY IP 250 OP 250 PS 637: Mod: GY | Performed by: PHYSICIAN ASSISTANT

## 2020-05-28 PROCEDURE — 97530 THERAPEUTIC ACTIVITIES: CPT | Mod: GO

## 2020-05-28 PROCEDURE — 4A023N6 MEASUREMENT OF CARDIAC SAMPLING AND PRESSURE, RIGHT HEART, PERCUTANEOUS APPROACH: ICD-10-PCS | Performed by: INTERNAL MEDICINE

## 2020-05-28 PROCEDURE — 25000132 ZZH RX MED GY IP 250 OP 250 PS 637: Mod: GY | Performed by: INTERNAL MEDICINE

## 2020-05-28 PROCEDURE — 85347 COAGULATION TIME ACTIVATED: CPT

## 2020-05-28 PROCEDURE — 02HQ32Z INSERTION OF MONITORING DEVICE INTO RIGHT PULMONARY ARTERY, PERCUTANEOUS APPROACH: ICD-10-PCS | Performed by: INTERNAL MEDICINE

## 2020-05-28 PROCEDURE — C1887 CATHETER, GUIDING: HCPCS | Performed by: INTERNAL MEDICINE

## 2020-05-28 PROCEDURE — 25800030 ZZH RX IP 258 OP 636: Performed by: HOSPITALIST

## 2020-05-28 PROCEDURE — 25500064 ZZH RX 255 OP 636

## 2020-05-28 PROCEDURE — 25000128 H RX IP 250 OP 636: Performed by: HOSPITALIST

## 2020-05-28 PROCEDURE — 93453 R&L HRT CATH W/VENTRICLGRPHY: CPT | Performed by: INTERNAL MEDICINE

## 2020-05-28 PROCEDURE — 80048 BASIC METABOLIC PNL TOTAL CA: CPT | Performed by: HOSPITALIST

## 2020-05-28 PROCEDURE — 99153 MOD SED SAME PHYS/QHP EA: CPT | Performed by: INTERNAL MEDICINE

## 2020-05-28 PROCEDURE — 80076 HEPATIC FUNCTION PANEL: CPT | Performed by: NURSE PRACTITIONER

## 2020-05-28 PROCEDURE — 27210788 ZZH MANIFOLD CR3: Performed by: INTERNAL MEDICINE

## 2020-05-28 PROCEDURE — 83605 ASSAY OF LACTIC ACID: CPT | Performed by: NURSE PRACTITIONER

## 2020-05-28 PROCEDURE — 93321 DOPPLER ECHO F-UP/LMTD STD: CPT | Mod: 26 | Performed by: INTERNAL MEDICINE

## 2020-05-28 PROCEDURE — 99152 MOD SED SAME PHYS/QHP 5/>YRS: CPT | Mod: 59 | Performed by: INTERNAL MEDICINE

## 2020-05-28 PROCEDURE — 93308 TTE F-UP OR LMTD: CPT

## 2020-05-28 PROCEDURE — 85025 COMPLETE CBC W/AUTO DIFF WBC: CPT | Performed by: HOSPITALIST

## 2020-05-28 PROCEDURE — 25000132 ZZH RX MED GY IP 250 OP 250 PS 637: Mod: GY | Performed by: HOSPITALIST

## 2020-05-28 PROCEDURE — C1769 GUIDE WIRE: HCPCS | Performed by: INTERNAL MEDICINE

## 2020-05-28 PROCEDURE — 21000001 ZZH R&B HEART CARE

## 2020-05-28 PROCEDURE — U0003 INFECTIOUS AGENT DETECTION BY NUCLEIC ACID (DNA OR RNA); SEVERE ACUTE RESPIRATORY SYNDROME CORONAVIRUS 2 (SARS-COV-2) (CORONAVIRUS DISEASE [COVID-19]), AMPLIFIED PROBE TECHNIQUE, MAKING USE OF HIGH THROUGHPUT TECHNOLOGIES AS DESCRIBED BY CMS-2020-01-R: HCPCS | Performed by: NURSE PRACTITIONER

## 2020-05-28 PROCEDURE — C1894 INTRO/SHEATH, NON-LASER: HCPCS | Performed by: INTERNAL MEDICINE

## 2020-05-28 PROCEDURE — 82803 BLOOD GASES ANY COMBINATION: CPT

## 2020-05-28 PROCEDURE — 93460 R&L HRT ART/VENTRICLE ANGIO: CPT | Performed by: INTERNAL MEDICINE

## 2020-05-28 PROCEDURE — 25800030 ZZH RX IP 258 OP 636: Performed by: INTERNAL MEDICINE

## 2020-05-28 PROCEDURE — 99152 MOD SED SAME PHYS/QHP 5/>YRS: CPT | Performed by: INTERNAL MEDICINE

## 2020-05-28 PROCEDURE — 36415 COLL VENOUS BLD VENIPUNCTURE: CPT | Performed by: HOSPITALIST

## 2020-05-28 PROCEDURE — 25000132 ZZH RX MED GY IP 250 OP 250 PS 637: Mod: GY | Performed by: UROLOGY

## 2020-05-28 PROCEDURE — 99233 SBSQ HOSP IP/OBS HIGH 50: CPT | Mod: 25 | Performed by: INTERNAL MEDICINE

## 2020-05-28 PROCEDURE — 93325 DOPPLER ECHO COLOR FLOW MAPG: CPT | Mod: 26 | Performed by: INTERNAL MEDICINE

## 2020-05-28 PROCEDURE — 27210794 ZZH OR GENERAL SUPPLY STERILE: Performed by: INTERNAL MEDICINE

## 2020-05-28 PROCEDURE — 93308 TTE F-UP OR LMTD: CPT | Mod: 26 | Performed by: INTERNAL MEDICINE

## 2020-05-28 PROCEDURE — 85018 HEMOGLOBIN: CPT | Performed by: NURSE PRACTITIONER

## 2020-05-28 PROCEDURE — 87040 BLOOD CULTURE FOR BACTERIA: CPT | Performed by: NURSE PRACTITIONER

## 2020-05-28 PROCEDURE — 87040 BLOOD CULTURE FOR BACTERIA: CPT | Performed by: HOSPITALIST

## 2020-05-28 PROCEDURE — 81001 URINALYSIS AUTO W/SCOPE: CPT | Performed by: HOSPITALIST

## 2020-05-28 PROCEDURE — 93460 R&L HRT ART/VENTRICLE ANGIO: CPT | Mod: 26 | Performed by: INTERNAL MEDICINE

## 2020-05-28 RX ORDER — LIDOCAINE 40 MG/G
CREAM TOPICAL
Status: DISCONTINUED | OUTPATIENT
Start: 2020-05-28 | End: 2020-05-28

## 2020-05-28 RX ORDER — HEPARIN SODIUM 10000 [USP'U]/100ML
100-1000 INJECTION, SOLUTION INTRAVENOUS CONTINUOUS PRN
Status: DISCONTINUED | OUTPATIENT
Start: 2020-05-28 | End: 2020-05-28 | Stop reason: HOSPADM

## 2020-05-28 RX ORDER — LORAZEPAM 0.5 MG/1
0.5 TABLET ORAL
Status: DISCONTINUED | OUTPATIENT
Start: 2020-05-28 | End: 2020-05-28 | Stop reason: HOSPADM

## 2020-05-28 RX ORDER — LORAZEPAM 2 MG/ML
0.5 INJECTION INTRAMUSCULAR
Status: DISCONTINUED | OUTPATIENT
Start: 2020-05-28 | End: 2020-05-28 | Stop reason: HOSPADM

## 2020-05-28 RX ORDER — SODIUM CHLORIDE 9 MG/ML
INJECTION, SOLUTION INTRAVENOUS CONTINUOUS
Status: DISCONTINUED | OUTPATIENT
Start: 2020-05-28 | End: 2020-05-28

## 2020-05-28 RX ORDER — NALOXONE HYDROCHLORIDE 0.4 MG/ML
.2-.4 INJECTION, SOLUTION INTRAMUSCULAR; INTRAVENOUS; SUBCUTANEOUS
Status: DISCONTINUED | OUTPATIENT
Start: 2020-05-28 | End: 2020-05-29 | Stop reason: CLARIF

## 2020-05-28 RX ORDER — DOBUTAMINE HYDROCHLORIDE 200 MG/100ML
2-20 INJECTION INTRAVENOUS CONTINUOUS PRN
Status: DISCONTINUED | OUTPATIENT
Start: 2020-05-28 | End: 2020-05-28 | Stop reason: HOSPADM

## 2020-05-28 RX ORDER — ATROPINE SULFATE 0.1 MG/ML
0.5 INJECTION INTRAVENOUS EVERY 5 MIN PRN
Status: DISCONTINUED | OUTPATIENT
Start: 2020-05-28 | End: 2020-05-29 | Stop reason: CLARIF

## 2020-05-28 RX ORDER — LORAZEPAM 0.5 MG/1
0.5 TABLET ORAL
Status: CANCELLED | OUTPATIENT
Start: 2020-05-28

## 2020-05-28 RX ORDER — FENTANYL CITRATE 50 UG/ML
INJECTION, SOLUTION INTRAMUSCULAR; INTRAVENOUS
Status: DISCONTINUED | OUTPATIENT
Start: 2020-05-28 | End: 2020-05-28 | Stop reason: HOSPADM

## 2020-05-28 RX ORDER — EPTIFIBATIDE 2 MG/ML
2 INJECTION, SOLUTION INTRAVENOUS CONTINUOUS PRN
Status: DISCONTINUED | OUTPATIENT
Start: 2020-05-28 | End: 2020-05-28 | Stop reason: HOSPADM

## 2020-05-28 RX ORDER — METHYLPREDNISOLONE SODIUM SUCCINATE 125 MG/2ML
125 INJECTION, POWDER, LYOPHILIZED, FOR SOLUTION INTRAMUSCULAR; INTRAVENOUS
Status: DISCONTINUED | OUTPATIENT
Start: 2020-05-28 | End: 2020-05-30

## 2020-05-28 RX ORDER — DIPHENHYDRAMINE HYDROCHLORIDE 50 MG/ML
50 INJECTION INTRAMUSCULAR; INTRAVENOUS
Status: DISCONTINUED | OUTPATIENT
Start: 2020-05-28 | End: 2020-05-30

## 2020-05-28 RX ORDER — IOPAMIDOL 755 MG/ML
INJECTION, SOLUTION INTRAVASCULAR
Status: DISCONTINUED | OUTPATIENT
Start: 2020-05-28 | End: 2020-05-28 | Stop reason: HOSPADM

## 2020-05-28 RX ORDER — NALOXONE HYDROCHLORIDE 0.4 MG/ML
.1-.4 INJECTION, SOLUTION INTRAMUSCULAR; INTRAVENOUS; SUBCUTANEOUS
Status: DISCONTINUED | OUTPATIENT
Start: 2020-05-28 | End: 2020-05-28

## 2020-05-28 RX ORDER — FENTANYL CITRATE 50 UG/ML
25-50 INJECTION, SOLUTION INTRAMUSCULAR; INTRAVENOUS
Status: DISCONTINUED | OUTPATIENT
Start: 2020-05-28 | End: 2020-05-29 | Stop reason: CLARIF

## 2020-05-28 RX ORDER — ATROPINE SULFATE 0.1 MG/ML
0.5 INJECTION INTRAVENOUS EVERY 5 MIN PRN
Status: DISCONTINUED | OUTPATIENT
Start: 2020-05-28 | End: 2020-05-28

## 2020-05-28 RX ORDER — POTASSIUM CHLORIDE 1500 MG/1
20 TABLET, EXTENDED RELEASE ORAL
Status: CANCELLED | OUTPATIENT
Start: 2020-05-28

## 2020-05-28 RX ORDER — HEPARIN SODIUM 1000 [USP'U]/ML
INJECTION, SOLUTION INTRAVENOUS; SUBCUTANEOUS
Status: DISCONTINUED | OUTPATIENT
Start: 2020-05-28 | End: 2020-05-28 | Stop reason: HOSPADM

## 2020-05-28 RX ORDER — SODIUM CHLORIDE 9 MG/ML
INJECTION, SOLUTION INTRAVENOUS CONTINUOUS
Status: DISCONTINUED | OUTPATIENT
Start: 2020-05-28 | End: 2020-05-28 | Stop reason: HOSPADM

## 2020-05-28 RX ORDER — FLUMAZENIL 0.1 MG/ML
0.2 INJECTION, SOLUTION INTRAVENOUS
Status: DISCONTINUED | OUTPATIENT
Start: 2020-05-28 | End: 2020-05-28

## 2020-05-28 RX ORDER — FLUMAZENIL 0.1 MG/ML
0.2 INJECTION, SOLUTION INTRAVENOUS
Status: DISCONTINUED | OUTPATIENT
Start: 2020-05-28 | End: 2020-05-29 | Stop reason: CLARIF

## 2020-05-28 RX ORDER — LORAZEPAM 2 MG/ML
0.5 INJECTION INTRAMUSCULAR
Status: CANCELLED | OUTPATIENT
Start: 2020-05-28

## 2020-05-28 RX ORDER — ACETAMINOPHEN 325 MG/1
650 TABLET ORAL EVERY 4 HOURS PRN
Status: DISCONTINUED | OUTPATIENT
Start: 2020-05-28 | End: 2020-05-28

## 2020-05-28 RX ORDER — FENTANYL CITRATE 50 UG/ML
25-50 INJECTION, SOLUTION INTRAMUSCULAR; INTRAVENOUS
Status: DISCONTINUED | OUTPATIENT
Start: 2020-05-28 | End: 2020-05-28

## 2020-05-28 RX ORDER — NALOXONE HYDROCHLORIDE 0.4 MG/ML
.2-.4 INJECTION, SOLUTION INTRAMUSCULAR; INTRAVENOUS; SUBCUTANEOUS
Status: DISCONTINUED | OUTPATIENT
Start: 2020-05-28 | End: 2020-05-28

## 2020-05-28 RX ORDER — LIDOCAINE 40 MG/G
CREAM TOPICAL
Status: CANCELLED | OUTPATIENT
Start: 2020-05-28

## 2020-05-28 RX ORDER — EPTIFIBATIDE 2 MG/ML
180 INJECTION, SOLUTION INTRAVENOUS EVERY 10 MIN PRN
Status: DISCONTINUED | OUTPATIENT
Start: 2020-05-28 | End: 2020-05-28 | Stop reason: HOSPADM

## 2020-05-28 RX ORDER — ACETAMINOPHEN 325 MG/1
650 TABLET ORAL EVERY 4 HOURS PRN
Status: DISCONTINUED | OUTPATIENT
Start: 2020-05-28 | End: 2020-06-04 | Stop reason: HOSPADM

## 2020-05-28 RX ORDER — POTASSIUM CHLORIDE 1500 MG/1
20 TABLET, EXTENDED RELEASE ORAL
Status: DISCONTINUED | OUTPATIENT
Start: 2020-05-28 | End: 2020-05-28 | Stop reason: HOSPADM

## 2020-05-28 RX ORDER — NALOXONE HYDROCHLORIDE 0.4 MG/ML
.1-.4 INJECTION, SOLUTION INTRAMUSCULAR; INTRAVENOUS; SUBCUTANEOUS
Status: DISCONTINUED | OUTPATIENT
Start: 2020-05-28 | End: 2020-05-29 | Stop reason: CLARIF

## 2020-05-28 RX ADMIN — ACETAMINOPHEN 650 MG: 325 TABLET, FILM COATED ORAL at 12:18

## 2020-05-28 RX ADMIN — HEPARIN SODIUM 2000 UNITS: 1000 INJECTION INTRAVENOUS; SUBCUTANEOUS at 15:24

## 2020-05-28 RX ADMIN — FENTANYL CITRATE 25 MCG: 50 INJECTION, SOLUTION INTRAMUSCULAR; INTRAVENOUS at 15:24

## 2020-05-28 RX ADMIN — ATORVASTATIN CALCIUM 40 MG: 40 TABLET, FILM COATED ORAL at 09:28

## 2020-05-28 RX ADMIN — APIXABAN 5 MG: 5 TABLET, FILM COATED ORAL at 09:29

## 2020-05-28 RX ADMIN — SODIUM CHLORIDE: 9 INJECTION, SOLUTION INTRAVENOUS at 14:40

## 2020-05-28 RX ADMIN — DIGOXIN 125 MCG: 125 TABLET ORAL at 09:28

## 2020-05-28 RX ADMIN — LIDOCAINE HYDROCHLORIDE 1 ML: 10 INJECTION, SOLUTION EPIDURAL; INFILTRATION; INTRACAUDAL; PERINEURAL at 15:02

## 2020-05-28 RX ADMIN — METOPROLOL TARTRATE 12.5 MG: 25 TABLET, FILM COATED ORAL at 21:39

## 2020-05-28 RX ADMIN — POTASSIUM CHLORIDE 20 MEQ: 1.5 POWDER, FOR SOLUTION ORAL at 09:37

## 2020-05-28 RX ADMIN — METOPROLOL TARTRATE 12.5 MG: 25 TABLET, FILM COATED ORAL at 09:28

## 2020-05-28 RX ADMIN — CLOPIDOGREL BISULFATE 75 MG: 75 TABLET, FILM COATED ORAL at 09:29

## 2020-05-28 RX ADMIN — LIDOCAINE HYDROCHLORIDE 1 ML: 10 INJECTION, SOLUTION EPIDURAL; INFILTRATION; INTRACAUDAL; PERINEURAL at 14:48

## 2020-05-28 RX ADMIN — IOPAMIDOL 20 ML: 755 INJECTION, SOLUTION INTRAVENOUS at 15:43

## 2020-05-28 RX ADMIN — Medication 5 ML: at 19:02

## 2020-05-28 RX ADMIN — HUMAN ALBUMIN MICROSPHERES AND PERFLUTREN 9 ML: 10; .22 INJECTION, SOLUTION INTRAVENOUS at 11:23

## 2020-05-28 RX ADMIN — SODIUM CHLORIDE, PRESERVATIVE FREE 5 ML: 5 INJECTION INTRAVENOUS at 07:00

## 2020-05-28 RX ADMIN — TAMSULOSIN HYDROCHLORIDE 0.4 MG: 0.4 CAPSULE ORAL at 09:28

## 2020-05-28 ASSESSMENT — MIFFLIN-ST. JEOR: SCORE: 1449.31

## 2020-05-28 NOTE — PROGRESS NOTES
[facility]  Cardiology Progress Note  Date of Service: 05/28/2020  Primary Cardiologist: Dr. Xiong     Assessment & Plan    Efrem Ramos is a 74 year old male with past medical history significant for MEL, PVD, HLD, CVA 2015, atrial fibrillation, severe aortic stenosis and mild to moderate mitral valve stenosis admitted on 5/18/2020. He underwent and elective cardiac catheterization and RCA intervention prior to TAVR. Post procedurally he developed persistent bleeding from his left femoral access site and subsequent hemorrhagic shock.     Assessment:   1. Hemorrhagic shock secondary to left femoral artery prolonged bleeding post coronary angiogram.    Complicated by severe AS    CT scan showed active extravasation of the common iliac vs prox common femoral. Vascular surgery consulted and FemStop was repositioned based on US.     Initially requiring NorEpi and now weaned off pressor support as of 5/21.    Hemoglobin dropped to 7.8 and improved to 9.7 post transfusion of 4 units (12.1 prior to procedure).     Transfuse for hgb <7    Hemoglobin 9.0 today    Metoprolol restarted 5/24 BP soft, but stable    Iron studies Iron 26 (L), iron binding cap 191 (L), Iron Sat. Index 14 (L)-IV Iron     Repeat CT (5/27): no active extravasation and decrease in groin inguinal and scrotal hematomas.     Vascular surgery recommended ice, elevation and snug fitting underwear    2. Coronary artery disease    Status post PCI with adjunctive rotational atherectomy with drug-eluting stents x4 from proximal to distal RCA on 5/18/2020    ASA+Plavix+Eliquis x 1 week.     Now on Plavix + Eliquis as of 5/25    3. Severe aortic stenosis    Awaiting TAVR    4. Chronic atrial fibrillation with history of CVA    Eliquis was initially held due to bleed, but was restarted last evening 5/25    Episodes of RVR with activity. Resumed metoprolol 12.5 mg BID on 5/24 and started on temporary digoxin.     Plan:  1. R and L heart  catheterization  2. Echocardiogram    FABI WARD CNP  Pager:  [phone]   (7am - 5pm, M-F)    Interval History   He remains tachycardic and hypotensive, but this may be his hemodynamic state until his aortic valve is fixed.     Physical Exam   Temp: 99.1  F (37.3  C) Temp src: Oral BP: 108/74 Pulse: 134 Heart Rate: 134 Resp: 16 SpO2: 92 % O2 Device: None (Room air)    Vitals:    05/26/20 0912 05/27/20 0500 05/28/20 0621   Weight: 76.9 kg (169 lb 8 oz) 79.7 kg (175 lb 11.2 oz) 76.1 kg (167 lb 11.2 oz)       Constitutional:   NAD   Skin:   Warm and dry   Head:   Nontraumatic   Neck:   no JVD   Lungs:   symmetric, clear to auscultation   Cardiovascular:   irregularly irregular rhythm and 2/6 PRAFUL    Abdomen:   Benign   Extremities and Back:   Left groin site with diffuse hematoma with palpable pulses    Neurological:   Grossly nonfocal       Medications     - MEDICATION INSTRUCTIONS -       - MEDICATION INSTRUCTIONS -       - MEDICATION INSTRUCTIONS -       - MEDICATION INSTRUCTIONS -       Percutaneous Coronary Intervention orders placed (this is information for BPA alerting)       ACE/ARB/ARNI NOT PRESCRIBED         apixaban ANTICOAGULANT  5 mg Oral BID     atorvastatin  40 mg Oral Daily     clopidogrel  75 mg Oral Daily     digoxin  125 mcg Oral Daily     gabapentin  600 mg Oral QPM     heparin lock flush  5-10 mL Intracatheter Q24H     metoprolol tartrate  12.5 mg Oral BID     sodium chloride (PF)  3 mL Intracatheter Q8H     tamsulosin  0.4 mg Oral Daily       Data     Most Recent 3 CBC's:  Recent Labs   Lab Test 05/28/20  0625 05/27/20  0645 05/25/20  0700  05/24/20  0615  05/23/20  0423   WBC 12.4*  --   --   --  10.0  --  9.5   HGB 9.0* 8.3* 8.8*   < > 8.8*   < > 8.6*   MCV 97  --   --   --  95  --  94     --   --   --  241  --  219    < > = values in this interval not displayed.     Most Recent 3 BMP's:  Recent Labs   Lab Test 05/28/20  0625 05/27/20  0645 05/26/20  1359  05/24/20  0615   05/23/20  0423     --   --   --  139  --  143   POTASSIUM 3.4 3.4 3.8   < > 3.0*   < > 2.9*   CHLORIDE 110*  --   --   --  108  --  110*   CO2 24  --   --   --  27  --  27   BUN 8  --   --   --  4*  --  5*   CR 0.65*  --   --   --  0.61*  --  0.65*   ANIONGAP 4  --   --   --  4  --  6   ULISSES 7.4*  --   --   --  7.7*  --  7.7*   GLC 85  --   --   --  80  --  79    < > = values in this interval not displayed.     Most Recent 3 Troponin's:  Recent Labs   Lab Test 05/19/20  1736 05/19/20  1242 05/19/20  0850   TROPI 0.645* 0.692* 0.802*     Most Recent 3 BNP's:  Recent Labs   Lab Test 07/30/16  1555 07/27/16  1400   NTBNPI 3,617* 872     Most Recent Cholesterol Panel:  Recent Labs   Lab Test 06/24/19  1023   CHOL 118   LDL 51   HDL 51   TRIG 80

## 2020-05-28 NOTE — PLAN OF CARE
Patient is up with walker and stand by assist. Tolerated breakfast. Patient is very weak. Pain in the groin area gave Tylenol once.

## 2020-05-28 NOTE — PROGRESS NOTES
Sepsis Evaluation Progress Note    I Re Efrem Ramos due to abnormal vital signs triggering the Sepsis SIRS screening alert. He is not known to have an infection.     Physical Exam   Vital Signs:  Temp: 97.6  F (36.4  C) Temp src: Oral BP: 97/71 Pulse: 98 Heart Rate: 120 Resp: 16 SpO2: 96 % O2 Device: Nasal cannula      Lab:  Lactic Acid   Date Value Ref Range Status   05/28/2020 1.6 0.7 - 2.0 mmol/L Final     Lactate for Sepsis Protocol   Date Value Ref Range Status   05/28/2020 3.3 (H) 0.7 - 2.0 mmol/L Final     Comment:     Significant value called to and read back by  CHANDAN BERMUDEZ RN IN [facility] [place] @ 1237 DK         The patient is at baseline mental status.     The rest of their physical exam is significant for tachycardia; otherwise no change from baseline exam    Assessment & Plan   NO EVIDENCE OF SEPSIS at this time.  Vital sign, physical exam, and lab findings are likely due to concerns of cardiogenic shock; severe AoStenosis; prior TAVR candidate. .    Disposition: The patient was evaluated by Cardiology who plans RHC and consider need for emergent TAVR.  Larisa Carrillo MD    Sepsis Criteria   Sepsis: 2+ SIRS criteria due to infection  Severe Sepsis: Sepsis AND 1+ new sign of acute organ dysfunction (Note: lactate >2 is organ dysfunction)  Septic Shock: Sepsis AND hypotension despite volume resuscitation with 30 ml/kg crystalloid

## 2020-05-28 NOTE — PROVIDER NOTIFICATION
MD Notification    Notified Person: MD    Notified Person Name: Dr. Remy    Notification Date/Time: 5/28/2020 1906 / [id number]    Notification Interaction: Paged / Phone    Purpose of Notification: Do you want patient to receive the post procedure IV fluids given BP/HR and lactic values?    Orders Received: Hold post-procedure IV fluids as patient is volume overloaded.    Comments: High risk for sepsis or may be early sepsis; will require close monitoring of hemodynamics over the next 12 hours.

## 2020-05-28 NOTE — SIGNIFICANT EVENT
CARDIOLOGY EVENT NOTE    Notified of patient with elevated lactate. Patient with progressive hypotension and tachycardia over past several days. CTA yesterday demonstrated no recurrent acute bleed and patient evaluated by vascular surgery again yesterday. Hemoglobin stable this morning. Patient seen and evaluated again this afternoon. Appears critically unwell, ashen. Suspect lactate elevation from possible cardiogenic shock. Medications reviewed. On toprol XL 12.5 (stable dose for several days). Known severe aortic stenosis awaiting TAVR. Restarted on apixaban after bleed deemed stable. S/p 4 units PRBC.     -stat hemoglobin, LFTs  -stop toprol for now, permissive tachycardia   -will hold apixaban starting now pending further evaluation   -RHC with possible leave in [place]  -would consider more urgent TAVR if this is leading to progressive shock in setting of volume fluctuations although groin access issues have been problematic - to be discussed with Dr. Chawla   -temitope nevarez for sepsis, blood cultures, UA    Lala Remy MD MSc  Cardiology Staff

## 2020-05-28 NOTE — PROGRESS NOTES
[facility]    Medicine Progress Note - Hospitalist Service       Date of Admission:  5/18/2020  Assessment & Plan   Efrem Ramos is a 74 year old male with a past medical history of obstructive sleep apnea, hyperlipidemia, CVA, A. fib on chronic anticoagulation with Eliquis, coronary artery disease, mitral stenosis, and severe aortic stenosis who was admitted on 5/18/2020 following complications after an elective heart catheterization with stent placement and rotational artherectomy of RCA in anticipation of TAVR.  Postprocedure, patient was identified to have persistent cath site bleeding with development of massive hematoma and associated hemorrhagic shock.  Patient was identified to have active extravasation from the left common femoral artery. Hemostasis was achieved with ultrasound-guided direct pressure. He was resuscitated and admitted to the ICU with ongoing cares.  Once off of pressors patient transferred out of ICU/hospitalist contacted for resumption of care.    Postprocedure massive hematoma, left groin/scrotum  Hemorrhagic shock, resolved  Acute blood loss anemia  Postprocedure, developed acute hypotension with rapid development of groin/scrotal hematoma. In setting of chronic AC with Eliquis for afib, had been held for 3d in anticipation of procedure. Required initiation of massive transfusion protocol, pressor support.  - CTA A/P revealed active extravasation from left common femoral artery into left groin with extensive hemorrhage. Vascular surgery was urgently consulted, femstop repositioned and hemostasis achieved with direct ultrasound-guided pressure. Duplex ultrasound performed after direct pressure indicated resolution of active extravasation. Pt received a total of 4u PRBCs, last transfusion 5/21 for Hgb 7.8 and persistent hypotension.  Posttransfusion hemoglobin was 9.7, fluctuating between 8.6 and 9.8 since.  Hemoglobin stable.  -Continue to monitor daily Hgb  - Transfuse  PRN Hgb < 7  - Was on DAPT with ASA/Plavix given recent PCI.  This patient's hemoglobin has been stable for last several days cardiology started Eliquis and discontinued aspirin (5/25)  continue Plavix.  Drift down in hemoglobin, persistent pain.  Repeat CT, no active bleeding..  - Urology consulted given scrotal hematoma, scrotal support & elevation, PVRs have been wnl, Urology reconsulted, started on Flomax and recommending to monitor  -Low iron studies, IV iron x2.  -Increasing pain, appreciate general surgery consultation who recommended conservative management/local care.    Start PRN oxycodone, close monitor for any excess sedation on narcotics, monitor bowels.  Serial monitor hemoglobin, have remained stable.    Chronic Afib with RVR  Hx afib on chronic AC with Eliquis. PTA maintained on metoprolol for rate control.   - episodic RVR with activity.  Heart rate bumps up up to 140-150 with minimal activity secondary to severe aortic stenosis.  -PTA metoprolol resumed at a lower dose 12.5 twice daily 5/24/2020.  Started on  digoxin temporarily for rate control during this hospitalization   Cardiology following and adjusting his medication, appreciate input  -Eliquis restarted by Cardiology, close monitor.  Held this afternoon for heart cath.  -Metoprolol held this afternoon due to hypotension.  Heart cath this afternoon, verbal report from Cardiology,  [formal result report pending]  cites  EF WNL, gradient across the aortic valve not as severe as had been supposed with TAVR candidacy.  Recommend restart on metoprolol 12.5 mg twice daily with hold parameters, continues on dig; If hemoglobin stable restart Eliquis tomorrow..    Hypoxia  -Patient started needing oxygen; titrate to O2 sat 90%;  IV fluid discontinued,  -Monitor closely, incentive spirometry    CAD s/p PCIx4 to RCA (5/18/2020)  Hypertension  Hyperlipidemia  Pt with identified RCA disease on initial angiogram for TAVR workup performed 3/23/20,  "however due to uncertainty on repair approach (surgical v percutaneous) of aortic valve, RCA was not intervened upon at that time. CT TAVR was subsequently performed and indicated favorable anatomy for TAVR. Subsequently underwent angiogram 5/18/2020 with complex PCI, adjunctive rotational arthrectomy with DESx4 from proximal to distal RCA.  - Continues on DAPT with apixaban/Plavix given recent stent  -Restarted low-dose metoprolol 5/24/2020, dose adjustment per cardiology, see \"A. fib \"above.     Severe Aortic Stenosis  Undergoing workup for eventual outpatient TAVR.  -TAVR on hold related to severe hematoma/anemia.  -5/28/2020 hypotension and tachycardia fired lactate, elevated 3.3, see separate Note.  Hemoglobin stable, BP soft but stable.  Cardiology has evaluated patient, concern for impending cardiogenic shock given severe aortic stenosis, plan for cardiac catherization;  verbal report from Cardiology,  [formal result report pending]  cites  EF WNL, gradient across the aortic valve not as severe as had been supposed with TAVR candidacy.  Recommend restart on metoprolol 12.5 mg twice daily with hold parameters, and if hemoglobin stable restart Eliquis tomorrow..    MEL  - CPAP with home settings    Physical deconditioning  -OT PT.        Diet: Regular Diet Adult  Snacks/Supplements Adult: Boost Plus; Between Meals  NPO for Medical/Clinical Reasons Except for: Meds    DVT Prophylaxis: Pneumatic Compression Devices  Bravo Catheter: not present  Code Status: Full Code           Disposition Plan   Expected discharge: TBD pending cardiac decompensation, cor catheterization today, Cardiology recommendations.Entered: Larisa Carrillo MD 05/28/2020, 3:59 PM         Larisa Carrillo MD  Hospitalist Service  [facility]    ______________________________________________________________________    Interval History   Patient reports he is tired, continued pain and scrotum/groin related to " swelling/hematoma.  Persistent tachycardia with activities.  Later in the day tachycardia and hypotension triggered lactate/sepsis evaluation, please see separate Progress Note    Data reviewed today: I reviewed all medications, new labs and imaging results over the last 24 hours.    Physical Exam   Vital Signs: Temp: 97.6  F (36.4  C) Temp src: Oral BP: 97/71 Pulse: 98 Heart Rate: 120 Resp: 16 SpO2: 96 % O2 Device: Nasal cannula    Weight: 167 lbs 11.2 oz      Constitutional: NAD, tired appearance, alert, calm.  Head: Atraumatic no masses, lesions, tenderness or abnormalities  ENT: Buccal mucosa moist.  Cardiovascular: Irregular, 2/6 murmur.  Respiratory: Air, no rales or wheeze.  Respirations nonlabored.  Gastrointestinal: Abdomen soft, non-tender.  No rebound guarding or other peritoneal signs.  : Extensive ecchymoses across lower abdomen, suprapubic region.  2/4 scrotal edema bilaterally.  Extremities : No edema , no clubbing or cyanosis    Neurologic, gross motor testing intact, nonfocal  Psych: Affect calm.      Data   Recent Labs   Lab 05/28/20  1412 05/28/20  0625 05/27/20  0645 05/26/20  1359  05/24/20  0615  05/23/20  0423   WBC  --  12.4*  --   --   --  10.0  --  9.5   HGB 9.1* 9.0* 8.3*  --    < > 8.8*   < > 8.6*   MCV  --  97  --   --   --  95  --  94   PLT  --  353  --   --   --  241  --  219   NA  --  138  --   --   --  139  --  143   POTASSIUM  --  3.4 3.4 3.8   < > 3.0*   < > 2.9*   CHLORIDE  --  110*  --   --   --  108  --  110*   CO2  --  24  --   --   --  27  --  27   BUN  --  8  --   --   --  4*  --  5*   CR  --  0.65*  --   --   --  0.61*  --  0.65*   ANIONGAP  --  4  --   --   --  4  --  6   ULISSES  --  7.4*  --   --   --  7.7*  --  7.7*   GLC  --  85  --   --   --  80  --  79   ALBUMIN 2.1*  --   --   --   --   --   --   --    PROTTOTAL 6.3*  --   --   --   --   --   --   --    BILITOTAL 1.3  --   --   --   --   --   --   --    ALKPHOS 88  --   --   --   --   --   --   --    ALT 15  --   --    --   --   --   --   --    AST 27  --   --   --   --   --   --   --     < > = values in this interval not displayed.     Recent Results (from the past 24 hour(s))   Echocardiogram Limited    Narrative    [id number]  [id number]  [id number]  [id number]^CHRISTEL^ALEJANDRA^LORIN           [facility]  Echocardiography Laboratory  [address] [place] [address]        Name: PHANI AVITIA  MRN: [id number]  : 1945  Study Date: 2020 10:49 AM  Age: 74 yrs  Gender: Male  Patient Location: [facility]  Reason For Study: Aortic Valve Disorder  Ordering Physician: ALEJANDRA KEARNEY  Referring Physician: Danny Paige  Performed By: Mary Kate Nuñez     BSA: 1.9 m2  Height: 67 in  Weight: 167 lb  HR: 108  BP: 123/77 mmHg  _____________________________________________________________________________  __        Procedure  Limited Echo Adult. Optison (NDC #[id number]) given intravenously.  _____________________________________________________________________________  __        Interpretation Summary     1. The left ventricle is normal in structure, function and size. The visual  ejection fraction is estimated at 60%.  2. The right ventricle is normal size. Mildly decreased right ventricular  systolic function  3. There is moderate to severe mitral annular calcification. The mitral valve  leaflets appear thickened, but open well. There is mild to moderate mitral  stenosis. Mean 5-6mmHg.  4. Moderate valvular aortic stenosis. Mean 22mmHg, Vmax 3.2m/s, SEBASTIAN 1.3cm, DI  0.39. Visually valve appears more in the severe range, suspect gradient is  underestimated.  5. There is moderate (2+) tricuspid regurgitation.  6. Dilation of the inferior vena cava is present with abnormal respiratory  variation in diameter.     Echo from 2-10-20 showed EF 50-55%, mild RV hypokinesis, MS with mean 6-7mmHg,  AS with mean 32mmHg, Vmax 3.3m/s, 3-4+ TR.     _____________________________________________________________________________  __         Left Ventricle  The left ventricle is normal in structure, function and size. The visual  ejection fraction is estimated at 60%. Normal left ventricular wall motion.     Right Ventricle  The right ventricle is normal size. Mildly decreased right ventricular  systolic function.     Mitral Valve  There is moderate to severe mitral annular calcification. The mitral valve  leaflets appear thickened, but open well. There is mild (1+) mitral  regurgitation. There is mild to moderate mitral stenosis. Mean 5-6mmHg.     Tricuspid Valve  The right ventricular systolic pressure is approximated at 36.6 mmHg plus the  right atrial pressure. There is moderate (2+) tricuspid regurgitation.        Aortic Valve  No aortic regurgitation is present. Moderate valvular aortic stenosis. Mean  22mmHg, Vmax 3.2m/s, SEBASTIAN 1.3cm, DI 0.39.     Vessels  Dilation of the inferior vena cava is present with abnormal respiratory  variation in diameter.     Pericardium  There is no pericardial effusion.     Rhythm  The rhythm was undetermined.     _____________________________________________________________________________  __  MMode/2D Measurements & Calculations  LVOT diam: 2.2 cm  LVOT area: 3.7 cm2        Doppler Measurements & Calculations  MV E max lex: 147.8 cm/sec  MV max P.4 mmHg  MV mean P.3 mmHg  MV V2 VTI: 37.8 cm  MVA(VTI): 1.9 cm2  MV dec time: 0.19 sec  Ao V2 max: 319.0 cm/sec  Ao max P.0 mmHg  Ao V2 mean: 221.0 cm/sec  Ao mean P.8 mmHg  Ao V2 VTI: 53.5 cm  SEBASTIAN(I,D): 1.3 cm2  SEBASTIAN(V,D): 1.4 cm2     LV V1 max P.2 mmHg  LV V1 max: 124.4 cm/sec  LV V1 VTI: 19.5 cm  SV(LVOT): 72.0 ml  SI(LVOT): 38.5 ml/m2  TR max lex: 302.7 cm/sec  TR max P.6 mmHg  AV Lex Ratio (DI): 0.39  SEBASTIAN Index (cm2/m2): 0.72  E/E' av.1  Lateral E/e': 13.4  Medial E/e': 14.8           _____________________________________________________________________________  __           Report approved by: Funmilayo Bass 2020  12:10 PM        Medications     cangrelor (KENGREAL) infusion ADULT       - MEDICATION INSTRUCTIONS -       - MEDICATION INSTRUCTIONS -       - MEDICATION INSTRUCTIONS -       - MEDICATION INSTRUCTIONS -       DOBUTamine       eptifibatide       HEParin       - MEDICATION INSTRUCTIONS -       nitroPRUsside (NIPRIDE) IV infusion ADULT/PEDS GREATER than or EQUAL to 45 kg std conc       - MEDICATION INSTRUCTIONS -       Percutaneous Coronary Intervention orders placed (this is information for BPA alerting)       ACE/ARB/ARNI NOT PRESCRIBED       sodium chloride 150 mL/hr at 05/28/20 1440       aspirin  325 mg Oral Daily     atorvastatin  40 mg Oral Daily     cangrelor  30 mcg/kg Intravenous Once     clopidogrel  75 mg Oral Daily     digoxin  125 mcg Oral Daily     gabapentin  600 mg Oral QPM     heparin lock flush  5-10 mL Intracatheter Q24H     iron sucrose (VENOFER) intermittent infusion  300 mg Intravenous Daily     sodium chloride (PF)  3 mL Intracatheter Q8H     sodium chloride (PF)  3 mL Intracatheter Q8H     sodium chloride (PF)  3 mL Intracatheter Q8H     tamsulosin  0.4 mg Oral Daily       Discussed with Nursing, Cardiology PA, SW and Patient today

## 2020-05-28 NOTE — PLAN OF CARE
Neuro- A&O  Most Recent Vitals- Temp: 99.1  F (37.3  C) Temp src: Oral BP: 123/77 Pulse: 93 Heart Rate: 103 Resp: 16 SpO2: 92 % O2 Device: None (Room air)    Tele - Afib CVR  Cardiac- No chest pain reported, murmur noted   Resp- LS clear, HIGH, no shortness of breath at rest   O2- RA  Activity- Ax1 with walker and gait belt   Pain- Scrotal pain-ice pack applied with relief   Drips- SL  Drains/Tubes- Right IJ  Aggression Color- Green  Plan- Discharge to TCU 5/28-5/29    Twila Sharp, RN

## 2020-05-28 NOTE — PRE-PROCEDURE
GENERAL PRE-PROCEDURE:     Written consent obtained?: Yes    Risks and benefits: Risks, benefits and alternatives were discussed    Consent given by:  Patient  Patient states understanding of procedure being performed: Yes    Patient's understanding of procedure matches consent: Yes    Procedure consent matches procedure scheduled: Yes    Expected level of sedation:  Moderate  Appropriately NPO:  Yes  ASA Class:  Class 3- Severe systemic disease, definite functional limitations  Mallampati  :  Grade 1- soft palate, uvula, tonsillar pillars, and posterior pharyngeal wall visible  Lungs:  Lungs clear with good breath sounds bilaterally  Heart:  Normal heart sounds and rate  History & Physical reviewed:  History and physical reviewed and no updates needed  Statement of review:  I have reviewed the lab findings, diagnostic data, medications, and the plan for sedation

## 2020-05-28 NOTE — PROGRESS NOTES
SW:  D:  Call placed to update [facility] as to patient's status.  They will have a bed available for patient tomorrow.  P:  Will continue to follow.      DAVIE Shell, [facility]  Lead   [phone]  [facility]

## 2020-05-28 NOTE — PLAN OF CARE
Discharge Planner OT   Patient plan for discharge: now agreeable to TCU    Current status:  BP in supine 101/80,  BPM's, per RN ok to see. Min A for supine > sit due to discomfort. Pt ambulated x 75 ft with CGA and FWW. HR up to 160 BPM's, RN aware. BP 99/84 post session. Pt ambulated to bathroom with CGA and FWW.  min A for toilet transfer. Max A for pericares due to inc pain with reaching.  Pt completed g/h tasks sitting on BSC at sink with set- up A.      Barriers to return to prior living situation: medical status, impaired functional act tolerance,scrotal edema and pain    Recommendations for discharge: TCU    Rationale for recommendations:  Pt would benefit from continued skilled OT services to improve independence and safety with ADL's and functional transfers as pt is not at baseline.  Pt does not want to go to TCU, if home will require 24 hr S and A with ADL/IADL's and functional mobility at all times, use of FWW and home RN, OT and PT.        Entered by: Kendal Nolasco 05/28/2020 9:29 AM

## 2020-05-28 NOTE — PROGRESS NOTES
Received significant value lactic acid of 3.3. Text sent to Dr Carrillo, await further orders.     Spoke with Dr Remy who does not want any fluids given.

## 2020-05-29 ENCOUNTER — APPOINTMENT (OUTPATIENT)
Dept: OCCUPATIONAL THERAPY | Facility: CLINIC | Age: 75
DRG: 246 | End: 2020-05-29
Payer: MEDICARE

## 2020-05-29 ENCOUNTER — APPOINTMENT (OUTPATIENT)
Dept: GENERAL RADIOLOGY | Facility: CLINIC | Age: 75
DRG: 246 | End: 2020-05-29
Attending: HOSPITALIST
Payer: MEDICARE

## 2020-05-29 ENCOUNTER — APPOINTMENT (OUTPATIENT)
Dept: CT IMAGING | Facility: CLINIC | Age: 75
DRG: 246 | End: 2020-05-29
Attending: HOSPITALIST
Payer: MEDICARE

## 2020-05-29 ENCOUNTER — APPOINTMENT (OUTPATIENT)
Dept: ULTRASOUND IMAGING | Facility: CLINIC | Age: 75
DRG: 246 | End: 2020-05-29
Attending: NURSE PRACTITIONER
Payer: MEDICARE

## 2020-05-29 ENCOUNTER — APPOINTMENT (OUTPATIENT)
Dept: SPEECH THERAPY | Facility: CLINIC | Age: 75
DRG: 246 | End: 2020-05-29
Attending: HOSPITALIST
Payer: MEDICARE

## 2020-05-29 LAB
ALBUMIN SERPL-MCNC: 2.1 G/DL (ref 3.4–5)
ALP SERPL-CCNC: 71 U/L (ref 40–150)
ALT SERPL W P-5'-P-CCNC: 15 U/L (ref 0–70)
ANION GAP SERPL CALCULATED.3IONS-SCNC: 5 MMOL/L (ref 3–14)
AST SERPL W P-5'-P-CCNC: 28 U/L (ref 0–45)
BASOPHILS # BLD AUTO: 0.1 10E9/L (ref 0–0.2)
BASOPHILS NFR BLD AUTO: 0.3 %
BILIRUB DIRECT SERPL-MCNC: 0.4 MG/DL (ref 0–0.2)
BILIRUB SERPL-MCNC: 1.4 MG/DL (ref 0.2–1.3)
BUN SERPL-MCNC: 6 MG/DL (ref 7–30)
CALCIUM SERPL-MCNC: 7.7 MG/DL (ref 8.5–10.1)
CHLORIDE SERPL-SCNC: 109 MMOL/L (ref 94–109)
CO2 BLD-SCNC: 20 MMOL/L (ref 21–28)
CO2 BLD-SCNC: 22 MMOL/L (ref 21–28)
CO2 SERPL-SCNC: 24 MMOL/L (ref 20–32)
CREAT SERPL-MCNC: 0.67 MG/DL (ref 0.66–1.25)
DIFFERENTIAL METHOD BLD: ABNORMAL
EOSINOPHIL # BLD AUTO: 0.3 10E9/L (ref 0–0.7)
EOSINOPHIL NFR BLD AUTO: 1.7 %
ERYTHROCYTE [DISTWIDTH] IN BLOOD BY AUTOMATED COUNT: 16.8 % (ref 10–15)
GFR SERPL CREATININE-BSD FRML MDRD: >90 ML/MIN/{1.73_M2}
GLUCOSE SERPL-MCNC: 87 MG/DL (ref 70–99)
HCT VFR BLD AUTO: 27.1 % (ref 40–53)
HGB BLD-MCNC: 9 G/DL (ref 13.3–17.7)
IMM GRANULOCYTES # BLD: 0.1 10E9/L (ref 0–0.4)
IMM GRANULOCYTES NFR BLD: 0.5 %
KCT BLD-ACNC: 167 SEC (ref 75–150)
LACTATE BLD-SCNC: 0.9 MMOL/L (ref 0.7–2)
LDH SERPL L TO P-CCNC: 385 U/L (ref 85–227)
LYMPHOCYTES # BLD AUTO: 1 10E9/L (ref 0.8–5.3)
LYMPHOCYTES NFR BLD AUTO: 5.9 %
MCH RBC QN AUTO: 32.3 PG (ref 26.5–33)
MCHC RBC AUTO-ENTMCNC: 33.2 G/DL (ref 31.5–36.5)
MCV RBC AUTO: 97 FL (ref 78–100)
MONOCYTES # BLD AUTO: 1 10E9/L (ref 0–1.3)
MONOCYTES NFR BLD AUTO: 5.9 %
NEUTROPHILS # BLD AUTO: 14.9 10E9/L (ref 1.6–8.3)
NEUTROPHILS NFR BLD AUTO: 85.7 %
NRBC # BLD AUTO: 0 10*3/UL
NRBC BLD AUTO-RTO: 0 /100
NT-PROBNP SERPL-MCNC: 5428 PG/ML (ref 0–1800)
PCO2 BLD: 26 MM HG (ref 35–45)
PCO2 BLD: 30 MM HG (ref 35–45)
PH BLD: 7.47 PH (ref 7.35–7.45)
PH BLD: 7.5 PH (ref 7.35–7.45)
PLATELET # BLD AUTO: 388 10E9/L (ref 150–450)
PO2 BLD: 26 MM HG (ref 80–105)
PO2 BLD: 50 MM HG (ref 80–105)
POTASSIUM SERPL-SCNC: 4.1 MMOL/L (ref 3.4–5.3)
PROCALCITONIN SERPL-MCNC: <0.05 NG/ML
PROT SERPL-MCNC: 6.1 G/DL (ref 6.8–8.8)
RBC # BLD AUTO: 2.79 10E12/L (ref 4.4–5.9)
SAO2 % BLDA FROM PO2: 54 % (ref 92–100)
SAO2 % BLDA FROM PO2: 89 % (ref 92–100)
SARS-COV-2 PCR COMMENT: NORMAL
SARS-COV-2 RNA SPEC QL NAA+PROBE: NEGATIVE
SODIUM SERPL-SCNC: 138 MMOL/L (ref 133–144)
SPECIMEN SOURCE: NORMAL
WBC # BLD AUTO: 17.9 10E9/L (ref 4–11)

## 2020-05-29 PROCEDURE — 83605 ASSAY OF LACTIC ACID: CPT | Performed by: NURSE PRACTITIONER

## 2020-05-29 PROCEDURE — 85025 COMPLETE CBC W/AUTO DIFF WBC: CPT | Performed by: HOSPITALIST

## 2020-05-29 PROCEDURE — 25000125 ZZHC RX 250: Performed by: INTERNAL MEDICINE

## 2020-05-29 PROCEDURE — 25000132 ZZH RX MED GY IP 250 OP 250 PS 637: Mod: GY | Performed by: PHYSICIAN ASSISTANT

## 2020-05-29 PROCEDURE — 25000128 H RX IP 250 OP 636: Performed by: INTERNAL MEDICINE

## 2020-05-29 PROCEDURE — 83880 ASSAY OF NATRIURETIC PEPTIDE: CPT | Performed by: HOSPITALIST

## 2020-05-29 PROCEDURE — 92610 EVALUATE SWALLOWING FUNCTION: CPT | Mod: GN | Performed by: SPEECH-LANGUAGE PATHOLOGIST

## 2020-05-29 PROCEDURE — 83615 LACTATE (LD) (LDH) ENZYME: CPT | Performed by: NURSE PRACTITIONER

## 2020-05-29 PROCEDURE — 99233 SBSQ HOSP IP/OBS HIGH 50: CPT | Performed by: HOSPITALIST

## 2020-05-29 PROCEDURE — 80048 BASIC METABOLIC PNL TOTAL CA: CPT | Performed by: HOSPITALIST

## 2020-05-29 PROCEDURE — 80076 HEPATIC FUNCTION PANEL: CPT | Performed by: NURSE PRACTITIONER

## 2020-05-29 PROCEDURE — 25000132 ZZH RX MED GY IP 250 OP 250 PS 637: Mod: GY | Performed by: NURSE PRACTITIONER

## 2020-05-29 PROCEDURE — 93971 EXTREMITY STUDY: CPT

## 2020-05-29 PROCEDURE — 70450 CT HEAD/BRAIN W/O DYE: CPT

## 2020-05-29 PROCEDURE — 99233 SBSQ HOSP IP/OBS HIGH 50: CPT | Performed by: INTERNAL MEDICINE

## 2020-05-29 PROCEDURE — 84145 PROCALCITONIN (PCT): CPT | Performed by: HOSPITALIST

## 2020-05-29 PROCEDURE — 21000001 ZZH R&B HEART CARE

## 2020-05-29 PROCEDURE — 25000128 H RX IP 250 OP 636: Performed by: HOSPITALIST

## 2020-05-29 PROCEDURE — 25000132 ZZH RX MED GY IP 250 OP 250 PS 637: Mod: GY | Performed by: UROLOGY

## 2020-05-29 PROCEDURE — 25800030 ZZH RX IP 258 OP 636: Performed by: HOSPITALIST

## 2020-05-29 PROCEDURE — 97110 THERAPEUTIC EXERCISES: CPT | Mod: GO

## 2020-05-29 PROCEDURE — 92526 ORAL FUNCTION THERAPY: CPT | Mod: GN | Performed by: SPEECH-LANGUAGE PATHOLOGIST

## 2020-05-29 PROCEDURE — 71045 X-RAY EXAM CHEST 1 VIEW: CPT

## 2020-05-29 PROCEDURE — 25000132 ZZH RX MED GY IP 250 OP 250 PS 637: Mod: GY | Performed by: HOSPITALIST

## 2020-05-29 RX ORDER — METOPROLOL TARTRATE 1 MG/ML
2.5 INJECTION, SOLUTION INTRAVENOUS EVERY 4 HOURS PRN
Status: DISCONTINUED | OUTPATIENT
Start: 2020-05-29 | End: 2020-06-04 | Stop reason: HOSPADM

## 2020-05-29 RX ORDER — CEFTRIAXONE 1 G/1
1 INJECTION, POWDER, FOR SOLUTION INTRAMUSCULAR; INTRAVENOUS EVERY 24 HOURS
Status: DISCONTINUED | OUTPATIENT
Start: 2020-05-29 | End: 2020-05-31

## 2020-05-29 RX ORDER — METOPROLOL TARTRATE 1 MG/ML
2.5 INJECTION, SOLUTION INTRAVENOUS ONCE
Status: COMPLETED | OUTPATIENT
Start: 2020-05-29 | End: 2020-05-29

## 2020-05-29 RX ADMIN — Medication 5 ML: at 06:18

## 2020-05-29 RX ADMIN — METOPROLOL TARTRATE 12.5 MG: 25 TABLET, FILM COATED ORAL at 09:56

## 2020-05-29 RX ADMIN — Medication 5 ML: at 13:09

## 2020-05-29 RX ADMIN — ATORVASTATIN CALCIUM 40 MG: 40 TABLET, FILM COATED ORAL at 08:48

## 2020-05-29 RX ADMIN — METOPROLOL TARTRATE 2.5 MG: 5 INJECTION INTRAVENOUS at 03:34

## 2020-05-29 RX ADMIN — CLOPIDOGREL BISULFATE 75 MG: 75 TABLET, FILM COATED ORAL at 08:48

## 2020-05-29 RX ADMIN — METOPROLOL TARTRATE 2.5 MG: 5 INJECTION INTRAVENOUS at 06:10

## 2020-05-29 RX ADMIN — APIXABAN 5 MG: 5 TABLET, FILM COATED ORAL at 15:53

## 2020-05-29 RX ADMIN — VANCOMYCIN HYDROCHLORIDE 1500 MG: 5 INJECTION, POWDER, LYOPHILIZED, FOR SOLUTION INTRAVENOUS at 22:10

## 2020-05-29 RX ADMIN — Medication 5 ML: at 11:50

## 2020-05-29 RX ADMIN — CEFTRIAXONE 1 G: 1 INJECTION, POWDER, FOR SOLUTION INTRAMUSCULAR; INTRAVENOUS at 09:57

## 2020-05-29 RX ADMIN — OXYCODONE HYDROCHLORIDE 5 MG: 5 TABLET ORAL at 09:55

## 2020-05-29 RX ADMIN — TAMSULOSIN HYDROCHLORIDE 0.4 MG: 0.4 CAPSULE ORAL at 08:48

## 2020-05-29 RX ADMIN — Medication 5 ML: at 12:30

## 2020-05-29 RX ADMIN — IRON SUCROSE 300 MG: 20 INJECTION, SOLUTION INTRAVENOUS at 08:44

## 2020-05-29 RX ADMIN — VANCOMYCIN HYDROCHLORIDE 1500 MG: 5 INJECTION, POWDER, LYOPHILIZED, FOR SOLUTION INTRAVENOUS at 10:26

## 2020-05-29 RX ADMIN — DIGOXIN 125 MCG: 125 TABLET ORAL at 08:45

## 2020-05-29 RX ADMIN — METOPROLOL TARTRATE 2.5 MG: 5 INJECTION INTRAVENOUS at 04:32

## 2020-05-29 RX ADMIN — SODIUM CHLORIDE, PRESERVATIVE FREE 5 ML: 5 INJECTION INTRAVENOUS at 06:10

## 2020-05-29 ASSESSMENT — MIFFLIN-ST. JEOR
SCORE: 1440.63
SCORE: 1440.24

## 2020-05-29 NOTE — PLAN OF CARE
Patient plan for discharge: now agreeable to TCU     Current status:  BP in supine 84/54, HR 91 BPM's, per RN ok to see for light activity but defer ambulation. Mod A for supine > sit and frequent cues. Pt reporting SOB and required cues for PLB. BP increased with sitting EOB to 96/60. Tolerating sitting EOB for one minute with Malinda for sitting balance then impulsively laid down. Needed mod assist for semi-supine to sit again. Cues to sidescoot hips towards HOB. Completed sit to supine with Malinda. Slight assist and max cues to reposition in bed. Engaged in light BLE exercises while supine.      Barriers to return to prior living situation: medical status, impaired functional act tolerance,scrotal edema and pain, need for supplemental O2     Recommendations for discharge: TCU     Rationale for recommendations:  Pt would benefit from continued skilled OT services to improve independence and safety with ADL's and functional transfers as pt is not at baseline.  Pt does not want to go to TCU, if home will require 24 hr S and A with ADL/IADL's and functional mobility at all times, use of FWW and home RN, OT and PT.

## 2020-05-29 NOTE — PLAN OF CARE
Patient is confused this morning, very weak, SOB with activity. Patient is yellow color, back pain gave oxycodone once today. Refused to get up in the chair, one loose stool early in the morning. Blood pressure os low side, cardiology aware. Rapid a-fib with any activity. Groin, scrotum  edema. Choked on the pill this morning. Speech saw patient.

## 2020-05-29 NOTE — PHARMACY-VANCOMYCIN DOSING SERVICE
Pharmacy Vancomycin Initial Note  Date of Service May 29, 2020  Patient's  1945  74 year old, male    Indication: [facility]ated Pneumonia    Current estimated CrCl = Estimated Creatinine Clearance: 99.8 mL/min (based on SCr of 0.67 mg/dL).    Creatinine for last 3 days  2020:  6:25 AM Creatinine 0.65 mg/dL  2020:  6:20 AM Creatinine 0.67 mg/dL    Recent Vancomycin Level(s) for last 3 days  No results found for requested labs within last 72 hours.      Vancomycin IV Administrations (past 72 hours)      No vancomycin orders with administrations in past 72 hours.                Nephrotoxins and other renal medications (From now, onward)    Start     Dose/Rate Route Frequency Ordered Stop    20 1000  vancomycin 1500 mg in 0.9% NaCl 250 ml intermittent infusion 1,500 mg      1,500 mg  over 90 Minutes Intravenous EVERY 12 HOURS 20 0922            Contrast Orders - past 72 hours (72h ago, onward)    Start     Dose/Rate Route Frequency Ordered Stop    20 1543  iopamidol (ISOVUE-370) solution  Status:  Discontinued        ONCE PRN 20 1543 20 1607    20 1130  perflutren diluted 1mL to 2mL with saline (OPTISON) diluted injection 9 mL     Note to Pharmacy:  NDC # 9379-3428-92    9 mL Intravenous ONCE 20 1122 20 1123    20 0915  iopamidol (ISOVUE-370) solution 80 mL      80 mL Intravenous ONCE 20 0903 20 0917                Plan:  1.  Start vancomycin  1500 mg IV q12h.   2.  Goal Trough Level: 15-20 mg/L   3.  Pharmacy will check trough levels as appropriate in 1-3 Days.    4. Serum creatinine levels will be ordered daily for the first week of therapy and at least twice weekly for subsequent weeks.    5. [place] method utilized to dose vancomycin therapy: Method 1    Natasha Wright [facility]

## 2020-05-29 NOTE — PROGRESS NOTES
"   05/29/20 1543   General Information   Onset Date 05/18/20   Start of Care Date 05/29/20   Referring Physician Larisa Carrillo MD    Patient Profile Review/OT: Additional Occupational Profile Info See Profile for full history and prior level of function   Patient/Family Goals Statement determine cause of dysphagia   Swallowing Evaluation Bedside swallow evaluation   Behaviorial Observations Alert;Confused   Mode of current nutrition Oral diet   Type of oral diet Regular;Thin liquid   Comments \"Efrem Ramos is a 74 year old male with past medical history significant for MEL, PVD, HLD, CVA 2015, atrial fibrillation, severe aortic stenosis and mild to moderate mitral valve stenosis admitted on 5/18/2020. He underwent and elective cardiac catheterization and RCA intervention prior to TAVR. 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 Latest VFSS in 2018 found oropharyngeal and sx of esophageal dysphagia with cricopharyngeal prominence. EG at this time also found Benign-appearing esophageal stenosis and ?esophageal spasm. dysphagia diet level 2 with thin liquids with CHIN TUCK and DOUBLE SWALLOW, eating only when alert and up to chair was recommended at that time.   Clinical Swallow Evaluation   Oral Musculature generally intact   Esophageal Phase of Swallow   Patient reports or presents with symptoms of esophageal dysphagia Yes   General Therapy Interventions   Planned Therapy Interventions Dysphagia Treatment   Dysphagia treatment Modified diet education;Instruction of safe swallow strategies   Swallow Eval: Clinical Impressions   Skilled Criteria for Therapy Intervention Skilled criteria met.  " Treatment indicated.   Functional Assessment Scale (FAS) 3   Treatment Diagnosis moderate dysphagia   Diet texture recommendations Clear liquid;Thin liquids   Recommended Feeding/Eating Techniques maintain upright posture during/after eating for 30 mins;small sips/bites   Therapy Frequency Daily   Predicted Duration of Therapy Intervention (days/wks) 1 week   Anticipated Discharge Disposition extended care facility   Risks and Benefits of Treatment have been explained. Yes   Patient, family and/or staff in agreement with Plan of Care Yes   Clinical Impression Comments SLP: SLP consulted after aspiration event with thin liquids and pill with concern for choking. Pt pleasantly confused and unable provide a history, but reported inability to swallow anything but boost shake. Pt also unable to sit fully upright d/t back pain. Pt accepted small amount of thin liquids with good timing and no immediate overt s/sx of aspiration, however, belching/gurgling noted with delayed coughing. Lengthy discussion with pt. Pt would like to pursue a repeat Video Swallow Study. Would allow clear liquids only with meds crushed for tonight. Pending VFSS, may need additional GI consult and EGD. Will tentatively schedule VFSS for 5/30 am.    Total Evaluation Time   Total Evaluation Time (Minutes) 20

## 2020-05-29 NOTE — PROVIDER NOTIFICATION
Brief update:    Added low dose IV PRN metoprolol w/ one time 2.5 mg dose, additional PRN doses available thereafter    (note prior hypotension, but now restarting low dose metoprolol as per PTA)    Jonathan Franks MD  3:18 AM

## 2020-05-29 NOTE — PROGRESS NOTES
[facility]  Cardiology Progress Note  Date of Service: 05/29/2020  Primary Cardiologist: Dr. Xiong     Assessment & Plan    Efrem Ramos is a 74 year old male with past medical history significant for MEL, PVD, HLD, CVA 2015, atrial fibrillation, severe aortic stenosis and mild to moderate mitral valve stenosis admitted on 5/18/2020. He underwent and elective cardiac catheterization and RCA intervention prior to TAVR. Post procedurally he developed persistent bleeding from his left femoral access site and subsequent hemorrhagic shock.     Assessment:   1. Elevated Lactate concerning for Sepsis    CXR    Blood cultures    US    IV ABx    2. Hemorrhagic shock secondary to left femoral artery prolonged bleeding post coronary angiogram.    Complicated by severe AS    CT scan showed active extravasation of the common iliac vs prox common femoral. Vascular surgery consulted and FemStop was repositioned based on US.     Initially requiring NorEpi and now weaned off pressor support as of 5/21.    Hemoglobin dropped to 7.8 and improved to 9.7 post transfusion of 4 units (12.1 prior to procedure).     Transfuse for hgb <7    Hemoglobin 9.0 today    Metoprolol restarted 5/24 BP soft, but stable    Iron studies Iron 26 (L), iron binding cap 191 (L), Iron Sat. Index 14 (L)-IV Iron     Repeat CT (5/27): no active extravasation and decrease in groin inguinal and scrotal hematomas.     Vascular surgery recommended ice, elevation and snug fitting underwear    RHC (5/28): RA pressures 14 mmHg, Wedge 18 mmHg, LVEDP 14 mmHg, CO 5.16 l/min    3. Coronary artery disease    Status post PCI with adjunctive rotational atherectomy with drug-eluting stents x4 from proximal to distal RCA on 5/18/2020    ASA+Plavix+Eliquis x 1 week.     Now on Plavix + Eliquis as of 5/25    4. Severe aortic stenosis    Awaiting TAVR    5. Chronic atrial fibrillation with history of CVA    Eliquis was initially held due to bleed, but was  restarted last evening 5/25    Episodes of RVR with activity. Resumed metoprolol 12.5 mg BID on 5/24 and started on temporary digoxin.     Plan:  1. Restart Eliquis 5 mg BID  2. Metoprolol 25 mg BID  3. Left groin US today to assess previous hematoma and pseudoaneurysm.    FABI WARD CNP  Pager:  [phone]   (7am - 5pm, M-F)    Interval History   He remains tachycardic and hypotensive, but this may be his hemodynamic state until his aortic valve is fixed. Confused this AM and being worked up by hospitalist service. If IV hydration is necessary, consider D5 1/2 NS.     Physical Exam   Temp: 98.5  F (36.9  C) Temp src: Oral BP: 102/85 Pulse: 174 Heart Rate: 101 Resp: 20 SpO2: 96 % O2 Device: Nasal cannula Oxygen Delivery: 2 LPM  Vitals:    05/27/20 0500 05/28/20 0621 05/29/20 0500   Weight: 79.7 kg (175 lb 11.2 oz) 76.1 kg (167 lb 11.2 oz) 75.2 kg (165 lb 12.6 oz)       Constitutional:   NAD   Skin:   Warm and dry   Head:   Nontraumatic   Neck:   no JVD   Lungs:   symmetric, clear to auscultation   Cardiovascular:   irregularly irregular rhythm and 2/6 PRAFUL    Abdomen:   Benign   Extremities and Back:   Left groin site with diffuse hematoma with palpable pulses. Left radial with mild old blood right brachial WNL    Neurological:   Grossly nonfocal       Medications     - MEDICATION INSTRUCTIONS -       - MEDICATION INSTRUCTIONS -       - MEDICATION INSTRUCTIONS -       - MEDICATION INSTRUCTIONS -       Percutaneous Coronary Intervention orders placed (this is information for BPA alerting)       ACE/ARB/ARNI NOT PRESCRIBED         atorvastatin  40 mg Oral Daily     clopidogrel  75 mg Oral Daily     digoxin  125 mcg Oral Daily     gabapentin  600 mg Oral QPM     heparin lock flush  5-10 mL Intracatheter Q24H     iron sucrose (VENOFER) intermittent infusion  300 mg Intravenous Daily     metoprolol tartrate  12.5 mg Oral BID     tamsulosin  0.4 mg Oral Daily       Data     Most Recent 3 CBC's:  Recent Labs    Lab Test 05/29/20  0620 05/28/20  1412 05/28/20  0625  05/24/20  0615   WBC 17.9*  --  12.4*  --  10.0   HGB 9.0* 9.1* 9.0*   < > 8.8*   MCV 97  --  97  --  95     --  353  --  241    < > = values in this interval not displayed.     Most Recent 3 BMP's:  Recent Labs   Lab Test 05/29/20  0620 05/28/20  0625 05/27/20  0645  05/24/20  0615    138  --   --  139   POTASSIUM 4.1 3.4 3.4   < > 3.0*   CHLORIDE 109 110*  --   --  108   CO2 24 24  --   --  27   BUN 6* 8  --   --  4*   CR 0.67 0.65*  --   --  0.61*   ANIONGAP 5 4  --   --  4   ULISSES 7.7* 7.4*  --   --  7.7*   GLC 87 85  --   --  80    < > = values in this interval not displayed.     Most Recent 3 Troponin's:  Recent Labs   Lab Test 05/19/20  1736 05/19/20  1242 05/19/20  0850   TROPI 0.645* 0.692* 0.802*     Most Recent 3 BNP's:  Recent Labs   Lab Test 07/30/16  1555 07/27/16  1400   NTBNPI 3,617* 872     Most Recent Cholesterol Panel:  Recent Labs   Lab Test 06/24/19  1023   CHOL 118   LDL 51   HDL 51   TRIG 80

## 2020-05-29 NOTE — PLAN OF CARE
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 Latest VFSS in 2018 found oropharyngeal and sx of esophageal dysphagia with cricopharyngeal prominence. EG at this time also found Benign-appearing esophageal stenosis and ?esophageal spasm. dysphagia diet level 2 with thin liquids with CHIN TUCK and DOUBLE SWALLOW, eating only when alert and up to chair was recommended at that time.       SLP consulted after aspiration event with thin liquids and pill with concern for choking. Pt pleasantly confused and unable provide a history, but reported inability to swallow anything but boost shake. Pt also unable to sit fully upright d/t back pain. Pt accepted small amount of thin liquids with good timing and no immediate overt s/sx of aspiration, however, belching/gurgling noted with delayed coughing. Lengthy discussion with pt. Pt would like to pursue a repeat Video Swallow Study.     Would allow clear liquids only with meds crushed in puree for tonight. Pending VFSS, may need additional GI consult and EGD. Will tentatively schedule VFSS for 5/30 am.     Barriers to return to prior living situation: Dysphagia baseline vs new deficits; pending VFSS  Recommendations for discharge: TCU  Rationale for recommendations: Pending VFSS       Entered by: Sabina Ching 05/29/2020 3:38 PM

## 2020-05-29 NOTE — PLAN OF CARE
DATE & TIME: 5/28/2020 8284-9179    COGNITION/BEHAVIOR: A&Ox4  Vital signs:  Temp: 98.5  F (36.9  C) Temp src: Oral BP: 119/77 Pulse: 104 Heart Rate: 101 Resp: 20 SpO2: 98 % via 2 LPM NC  TELEMETRY RHYTHM: A-Fib RVR in 110's  MOBILITY: Has yet to be out of bed post angio  PAIN: Denies  DIET: Choked on apple juice and metoprolol at HS; held gabapentin.  RESP: LS clear  CV/PV: Irregular apical pulse, tachycardic, with a murmur.  3-4+ edema in the hips and scrotum.  Tolerates PCD's.  GI/: No stool this shift, voiding adequately  SKIN: Grossly bruised in the pelvic region.  Puncture to left wrist from angio.  LINES: Right triple lumen internal jugular heparin locked.  Left radial site with TR band in place, gradually reducing air.  LAB/BG: Asymptomatic COVID-19 test pending.  Potassium recheck in the morning following replacement earlier today.  TESTS/PROCEDURES: Coronary angio 5/28/2020 without intervention

## 2020-05-29 NOTE — PLAN OF CARE
Pt stable after Angiogram, left wrist bruised but intact with out bleeding.  BP stable, but HR was elevated and in Afib RVR, HR'[id number]'s. Pt received PRN IV Metoprolol per direction of Dr Franks.  Pt is asymptomatic with elevated HR's, he denied SOB or discomfort.  Pt was reported from Lab that he is Covid Neg.  He is alert and oriented and able to make his needs known.  Will continue to monitor.

## 2020-05-30 ENCOUNTER — APPOINTMENT (OUTPATIENT)
Dept: SPEECH THERAPY | Facility: CLINIC | Age: 75
DRG: 246 | End: 2020-05-30
Payer: MEDICARE

## 2020-05-30 LAB
ANION GAP SERPL CALCULATED.3IONS-SCNC: 5 MMOL/L (ref 3–14)
BASOPHILS # BLD AUTO: 0.1 10E9/L (ref 0–0.2)
BASOPHILS NFR BLD AUTO: 0.7 %
BUN SERPL-MCNC: 7 MG/DL (ref 7–30)
CALCIUM SERPL-MCNC: 7.4 MG/DL (ref 8.5–10.1)
CHLORIDE SERPL-SCNC: 109 MMOL/L (ref 94–109)
CO2 SERPL-SCNC: 24 MMOL/L (ref 20–32)
CREAT SERPL-MCNC: 0.69 MG/DL (ref 0.66–1.25)
DIFFERENTIAL METHOD BLD: ABNORMAL
EOSINOPHIL # BLD AUTO: 0.5 10E9/L (ref 0–0.7)
EOSINOPHIL NFR BLD AUTO: 4.2 %
ERYTHROCYTE [DISTWIDTH] IN BLOOD BY AUTOMATED COUNT: 17.1 % (ref 10–15)
GFR SERPL CREATININE-BSD FRML MDRD: >90 ML/MIN/{1.73_M2}
GLUCOSE SERPL-MCNC: 77 MG/DL (ref 70–99)
HCT VFR BLD AUTO: 24.3 % (ref 40–53)
HGB BLD-MCNC: 7.9 G/DL (ref 13.3–17.7)
HGB BLD-MCNC: 8.9 G/DL (ref 13.3–17.7)
IMM GRANULOCYTES # BLD: 0.1 10E9/L (ref 0–0.4)
IMM GRANULOCYTES NFR BLD: 1.1 %
LACTATE BLD-SCNC: 0.9 MMOL/L (ref 0.7–2)
LYMPHOCYTES # BLD AUTO: 2.1 10E9/L (ref 0.8–5.3)
LYMPHOCYTES NFR BLD AUTO: 17.2 %
MCH RBC QN AUTO: 31.9 PG (ref 26.5–33)
MCHC RBC AUTO-ENTMCNC: 32.5 G/DL (ref 31.5–36.5)
MCV RBC AUTO: 98 FL (ref 78–100)
MONOCYTES # BLD AUTO: 1.2 10E9/L (ref 0–1.3)
MONOCYTES NFR BLD AUTO: 10.1 %
NEUTROPHILS # BLD AUTO: 8.1 10E9/L (ref 1.6–8.3)
NEUTROPHILS NFR BLD AUTO: 66.7 %
NRBC # BLD AUTO: 0 10*3/UL
NRBC BLD AUTO-RTO: 0 /100
PLATELET # BLD AUTO: 375 10E9/L (ref 150–450)
POTASSIUM SERPL-SCNC: 3.6 MMOL/L (ref 3.4–5.3)
RBC # BLD AUTO: 2.48 10E12/L (ref 4.4–5.9)
SODIUM SERPL-SCNC: 138 MMOL/L (ref 133–144)
WBC # BLD AUTO: 12.1 10E9/L (ref 4–11)

## 2020-05-30 PROCEDURE — 92526 ORAL FUNCTION THERAPY: CPT | Mod: GN | Performed by: SPEECH-LANGUAGE PATHOLOGIST

## 2020-05-30 PROCEDURE — 99232 SBSQ HOSP IP/OBS MODERATE 35: CPT | Performed by: INTERNAL MEDICINE

## 2020-05-30 PROCEDURE — 25000132 ZZH RX MED GY IP 250 OP 250 PS 637: Mod: GY | Performed by: PHYSICIAN ASSISTANT

## 2020-05-30 PROCEDURE — 21000001 ZZH R&B HEART CARE

## 2020-05-30 PROCEDURE — 83605 ASSAY OF LACTIC ACID: CPT | Performed by: HOSPITALIST

## 2020-05-30 PROCEDURE — 25000128 H RX IP 250 OP 636: Performed by: INTERNAL MEDICINE

## 2020-05-30 PROCEDURE — 25000132 ZZH RX MED GY IP 250 OP 250 PS 637: Mod: GY | Performed by: INTERNAL MEDICINE

## 2020-05-30 PROCEDURE — 85018 HEMOGLOBIN: CPT | Performed by: INTERNAL MEDICINE

## 2020-05-30 PROCEDURE — 25000128 H RX IP 250 OP 636: Performed by: HOSPITALIST

## 2020-05-30 PROCEDURE — 25000132 ZZH RX MED GY IP 250 OP 250 PS 637: Mod: GY | Performed by: NURSE PRACTITIONER

## 2020-05-30 PROCEDURE — 25000128 H RX IP 250 OP 636: Performed by: PHYSICIAN ASSISTANT

## 2020-05-30 PROCEDURE — 25800030 ZZH RX IP 258 OP 636: Performed by: HOSPITALIST

## 2020-05-30 PROCEDURE — 25000132 ZZH RX MED GY IP 250 OP 250 PS 637: Mod: GY | Performed by: HOSPITALIST

## 2020-05-30 PROCEDURE — 99233 SBSQ HOSP IP/OBS HIGH 50: CPT | Performed by: HOSPITALIST

## 2020-05-30 PROCEDURE — 85025 COMPLETE CBC W/AUTO DIFF WBC: CPT | Performed by: HOSPITALIST

## 2020-05-30 PROCEDURE — 80048 BASIC METABOLIC PNL TOTAL CA: CPT | Performed by: HOSPITALIST

## 2020-05-30 PROCEDURE — 25000132 ZZH RX MED GY IP 250 OP 250 PS 637: Mod: GY | Performed by: UROLOGY

## 2020-05-30 RX ORDER — NALOXONE HYDROCHLORIDE 0.4 MG/ML
.1-.4 INJECTION, SOLUTION INTRAMUSCULAR; INTRAVENOUS; SUBCUTANEOUS
Status: DISCONTINUED | OUTPATIENT
Start: 2020-05-30 | End: 2020-06-04 | Stop reason: HOSPADM

## 2020-05-30 RX ADMIN — TAMSULOSIN HYDROCHLORIDE 0.4 MG: 0.4 CAPSULE ORAL at 11:05

## 2020-05-30 RX ADMIN — DIGOXIN 125 MCG: 125 TABLET ORAL at 11:05

## 2020-05-30 RX ADMIN — SODIUM CHLORIDE, PRESERVATIVE FREE 5 ML: 5 INJECTION INTRAVENOUS at 05:43

## 2020-05-30 RX ADMIN — Medication 5 ML: at 09:41

## 2020-05-30 RX ADMIN — CEFTRIAXONE 1 G: 1 INJECTION, POWDER, FOR SOLUTION INTRAMUSCULAR; INTRAVENOUS at 08:40

## 2020-05-30 RX ADMIN — Medication 5 ML: at 00:10

## 2020-05-30 RX ADMIN — METOPROLOL TARTRATE 12.5 MG: 25 TABLET, FILM COATED ORAL at 11:05

## 2020-05-30 RX ADMIN — VANCOMYCIN HYDROCHLORIDE 1500 MG: 5 INJECTION, POWDER, LYOPHILIZED, FOR SOLUTION INTRAVENOUS at 22:01

## 2020-05-30 RX ADMIN — OXYCODONE HYDROCHLORIDE 5 MG: 5 TABLET ORAL at 00:08

## 2020-05-30 RX ADMIN — APIXABAN 5 MG: 5 TABLET, FILM COATED ORAL at 00:08

## 2020-05-30 RX ADMIN — Medication 5 ML: at 09:26

## 2020-05-30 RX ADMIN — GABAPENTIN 600 MG: 300 CAPSULE ORAL at 00:09

## 2020-05-30 RX ADMIN — Medication 5 ML: at 23:43

## 2020-05-30 RX ADMIN — ATORVASTATIN CALCIUM 40 MG: 40 TABLET, FILM COATED ORAL at 11:05

## 2020-05-30 RX ADMIN — CLOPIDOGREL BISULFATE 75 MG: 75 TABLET, FILM COATED ORAL at 11:05

## 2020-05-30 RX ADMIN — POTASSIUM CHLORIDE 20 MEQ: 29.8 INJECTION, SOLUTION INTRAVENOUS at 08:41

## 2020-05-30 RX ADMIN — ACETAMINOPHEN 650 MG: 325 TABLET, FILM COATED ORAL at 22:11

## 2020-05-30 RX ADMIN — METOPROLOL TARTRATE 12.5 MG: 25 TABLET, FILM COATED ORAL at 20:27

## 2020-05-30 RX ADMIN — VANCOMYCIN HYDROCHLORIDE 1500 MG: 5 INJECTION, POWDER, LYOPHILIZED, FOR SOLUTION INTRAVENOUS at 11:00

## 2020-05-30 RX ADMIN — METOPROLOL TARTRATE 12.5 MG: 25 TABLET, FILM COATED ORAL at 00:08

## 2020-05-30 RX ADMIN — APIXABAN 5 MG: 5 TABLET, FILM COATED ORAL at 11:04

## 2020-05-30 RX ADMIN — Medication 5 ML: at 13:14

## 2020-05-30 ASSESSMENT — MIFFLIN-ST. JEOR: SCORE: 1443.42

## 2020-05-30 NOTE — PLAN OF CARE
Discharge Planner SLP   Patient plan for discharge: Did not state  Current status: SLP: Attempted Video Swallow Study, however, unable to be completed due to Radiology scheduling. Pt reported that liquids are going well and pt is okay with crushed meds, but feels like there is residue in his throat. Education provided on diet levels.     Pt reported poor appetite, but would like to continue boost shakes. Will change order to full liquids, meds crushed. Based on previous Video Swallow's CHIN TUCK with liquids and DOUBLE SWALLOWS.     Barriers to return to prior living situation: Dysphagia; deconditioning  Recommendations for discharge: TCU  Rationale for recommendations: pending VFSS on Monday       Entered by: Sabina Ching 05/30/2020 10:49 AM

## 2020-05-30 NOTE — PLAN OF CARE
DATE & TIME: 5/29/2020 1500 - 5/30/2020 0700    COGNITION/BEHAVIOR: A&Ox4, though slow to respond.  Vital signs:  Temp: 98.3  F (36.8  C) Temp src: Oral BP: 97/70 Pulse: 87 Heart Rate: 110 Resp: 18 SpO2: 92 % via 2 LPM oxymask.  NC while awake.  TELEMETRY RHYTHM: A-Fib CVR  MOBILITY: SBA with GB, walker.  Ambulated to bathroom.  PAIN: Reports back and groin pain; PRN oxycodone x1, effective  DIET: Tolerates clear liquids.  Tolerated meds crushed in applesauce with intentional focus on swallowing including use of double swallow technique.  RESP: Crackles RML.  Endorses some SOB.  CV/PV: Irregular apical pulse.  Intermittently tachycardia.  Murmur present.  +3-4 edema in hips and groin, trace edema in LE's.  Testicles elevated in bed.  Reports some numbness/tingling in right second toe.  GI/: One loose bowel movement.  Voiding michelle urine in adequate amounts.  SKIN: Yellow, bruised, pale.  Groin significantly bruised.  Left radial and right brachial sites CDI, CMS intact.  LINES: Right internal jugular heparin locked.  Oxygen in use.  BG/LAB: K 3.6 this morning, replaced, recheck [place] morning.  TESTS/PROCEDURES: Barium swallow study 5/30/2020 for repeated choking on pills.

## 2020-05-30 NOTE — PLAN OF CARE
OT - Attempted to see pt for scheduled session, however, pt reports he is too cold and fatigued to participate in occupational therapy today. CX OT session today.

## 2020-05-30 NOTE — PLAN OF CARE
VSS--continues to have intermittent soft BPs but asymptomatic with this, HR rapid with activity.  Alert and oriented--forgetful at times. Needs barium swallow study completed.  Continues on antibiotics.  Weak and very frail. Rechecking hgb this evening, eliquis on hold again.  Continue to monitor closely.

## 2020-05-30 NOTE — PROGRESS NOTES
[facility]    Medicine Progress Note - Hospitalist Service       Date of Admission:  5/18/2020  Assessment & Plan   Efrem Ramos is a 74 year old male with a past medical history of obstructive sleep apnea, hyperlipidemia, CVA, A. fib on chronic anticoagulation with Eliquis, coronary artery disease, mitral stenosis, and severe aortic stenosis who was admitted on 5/18/2020 following complications after an elective heart catheterization with stent placement and rotational artherectomy of RCA in anticipation of TAVR.  Postprocedure, patient was identified to have persistent cath site bleeding with development of massive hematoma and associated hemorrhagic shock.  Patient was identified to have active extravasation from the left common femoral artery. Hemostasis was achieved with ultrasound-guided direct pressure. He was resuscitated and admitted to the ICU with ongoing cares.  Once off of pressors patient transferred out of ICU/hospitalist contacted for resumption of care.    Postprocedure massive hematoma, left groin/scrotum  Hemorrhagic shock, resolved  Acute blood loss anemia  Postprocedure, developed acute hypotension with rapid development of groin/scrotal hematoma. In setting of chronic AC with Eliquis for afib, had been held for 3d in anticipation of procedure. Required initiation of massive transfusion protocol, pressor support.  - CTA A/P revealed active extravasation from left common femoral artery into left groin with extensive hemorrhage. Vascular surgery was urgently consulted, femstop repositioned and hemostasis achieved with direct ultrasound-guided pressure. Duplex ultrasound performed after direct pressure indicated resolution of active extravasation. Pt received a total of 4u PRBCs, last transfusion 5/21 for Hgb 7.8 and persistent hypotension.  Posttransfusion hemoglobin was 9.7, fluctuating between 8.6 and 9.8 since.  Hemoglobin stable.  -Continue to monitor daily Hgb  - Transfuse  PRN Hgb < 7  - Was on DAPT with ASA/Plavix given recent PCI.  This patient's hemoglobin has been stable for last several days cardiology started Eliquis and discontinued aspirin (5/25)  continue Plavix.  Drift down in hemoglobin, persistent pain.  Repeat CT, no active bleeding..  - Urology consulted given scrotal hematoma, scrotal support & elevation, PVRs have been wnl, Urology reconsulted, started on Flomax and recommending to monitor  -Low iron studies, IV iron x2.  -Increasing pain, appreciate general surgery consultation who recommended conservative management/local care.    Start PRN oxycodone, close monitor for any excess sedation on narcotics, monitor bowels.  Serial monitor hemoglobin, have remained stable.    Chronic Afib with RVR  Hx afib on chronic AC with Eliquis. PTA maintained on metoprolol for rate control.   - episodic RVR with activity.  Heart rate bumps up up to 140-150 with minimal activity secondary to severe aortic stenosis.  -PTA metoprolol resumed at a lower dose 12.5 twice daily 5/24/2020.  Started on  digoxin temporarily for rate control during this hospitalization   Cardiology following and adjusting his medication, appreciate input  -Eliquis restarted by Cardiology, close monitor.  -Emergent cardiac catheterization 5/28/2020 final report pending, verbal report from Cardiology,   cites  EF WNL, gradient across the aortic valve not as severe as had been supposed with TAVR candidacy.  Recommend restart on metoprolol 12.5 mg twice daily with hold parameters, continues on dig; restarted on Eliquis.      Hypoxia  -Patient started needing oxygen; titrate to O2 sat 90%;  IV fluid discontinued,  -Monitor closely, incentive spirometry    CAD s/p PCIx4 to RCA (5/18/2020)  Hypertension  Hyperlipidemia  Pt with identified RCA disease on initial angiogram for TAVR workup performed 3/23/20, however due to uncertainty on repair approach (surgical v percutaneous) of aortic valve, RCA was not  "intervened upon at that time. CT TAVR was subsequently performed and indicated favorable anatomy for TAVR. Subsequently underwent angiogram 5/18/2020 with complex PCI, adjunctive rotational arthrectomy with DESx4 from proximal to distal RCA.  - Continues on DAPT with apixaban/Plavix given recent stent  -Restarted low-dose metoprolol 5/24/2020, dose adjustment per cardiology, see \"A. fib \"above.     Severe Aortic Stenosis  Undergoing workup for eventual outpatient TAVR.  -TAVR on hold related to severe hematoma/anemia.  -5/28/2020 hypotension and tachycardia fired lactate, elevated 3.3, see separate Note.  Hemoglobin stable, BP soft but stable.  Cardiology has evaluated patient, concern for impending cardiogenic shock given severe aortic stenosis, plan for cardiac catherization;  verbal report from Cardiology,  [formal result report pending]  cites  EF WNL, gradient across the aortic valve not as severe as had been supposed with TAVR candidacy.  Recommend restart on metoprolol 12.5 mg twice daily with hold parameters,restart Eliquis post cath.     Leukocytosis, lactic acidosis with hypotension, tachycardia  Bilateral pulmonary infiltrates  CXR new bilateral interstitial infiltrates suggesting infection or edema.  Given concerns of possible early sepsis firing sepsis protocol with lactic at 3.3, patient started on broad-spectrum antibiotics, given penicillin allergy elected ceftriaxone and vancomycin.  Ordered sputum to narrow antibiotics.  Nursing concerned of aspiration, ST to rule out aspiration.  Close monitor.    MEL  - CPAP with home settings    Physical deconditioning  -OT PT.        Diet: Snacks/Supplements Adult: Boost Plus; Between Meals  Combination Diet Full Liquid Diet; Thin Liquids (water, ice chips, juice, milk, gelatin, ice cream, etc) (CHIN TUCK)    DVT Prophylaxis: Pneumatic Compression Devices  Bravo Catheter: not present  Code Status: Full Code           Disposition Plan   Expected " discharge: TBD pending Cardiology recommendations with complex medical condition and labile hemodynamic status.      Entered: Larisa Carrillo MD 05/30/2020, 3:07 PM      Larisa Carrillo MD  Hospitalist Service  [facility]    ______________________________________________________________________    Interval History   Patient more sleepy, confused in his thoughts today.  Nursing reports no acute or new neurologic focality.  Has not been using PRN oxycodone, patient prefers only to use Tylenol.  Remains afebrile.  Persistent ecchymoses lower abdomen/scrotum due to hematoma, hemoglobin stable.    Data reviewed today: I reviewed all medications, new labs and imaging results over the last 24 hours.    Physical Exam   Vital Signs: Temp: 97.8  F (36.6  C) Temp src: Oral BP: 92/63 Pulse: 87   Resp: 16 SpO2: 96 % O2 Device: Nasal cannula Oxygen Delivery: 2 LPM  Weight: 166 lbs 6.4 oz      Constitutional: NAD, tired appearance, more sleepy yet arousable, calm.  Head: Atraumatic no masses, lesions, tenderness or abnormalities  ENT: Buccal mucosa moist.  Cardiovascular: Irregular, 2/6 murmur.  Respiratory: No rales or wheeze.  Respirations nonlabored.  Gastrointestinal: Abdomen soft, non-tender.  No rebound guarding or other peritoneal signs.  : Extensive ecchymoses across lower abdomen, suprapubic region.  2/4 scrotal edema bilaterally.  Extremities : No edema , no clubbing or cyanosis    Neurologic, gross motor testing intact, nonfocal  Psych: Affect calm.      Data   Recent Labs   Lab 05/30/20  0545 05/29/20  1310 05/29/20  0620 05/28/20  1412 05/28/20  0625   WBC 12.1*  --  17.9*  --  12.4*   HGB 7.9*  --  9.0* 9.1* 9.0*   MCV 98  --  97  --  97     --  388  --  353     --  138  --  138   POTASSIUM 3.6  --  4.1  --  3.4   CHLORIDE 109  --  109  --  110*   CO2 24  --  24  --  24   BUN 7  --  6*  --  8   CR 0.69  --  0.67  --  0.65*   ANIONGAP 5  --  5  --  4   ULISSES 7.4*  --  7.7*  --   7.4*   GLC 77  --  87  --  85   ALBUMIN  --  2.1*  --  2.1*  --    PROTTOTAL  --  6.1*  --  6.3*  --    BILITOTAL  --  1.4*  --  1.3  --    ALKPHOS  --  71  --  88  --    ALT  --  15  --  15  --    AST  --  28  --  27  --      No results found for this or any previous visit (from the past 24 hour(s)).  Medications     - MEDICATION INSTRUCTIONS -       - MEDICATION INSTRUCTIONS -       - MEDICATION INSTRUCTIONS -       - MEDICATION INSTRUCTIONS -       Percutaneous Coronary Intervention orders placed (this is information for BPA alerting)       ACE/ARB/ARNI NOT PRESCRIBED         atorvastatin  40 mg Oral Daily     cefTRIAXone  1 g Intravenous Q24H     clopidogrel  75 mg Oral Daily     digoxin  125 mcg Oral Daily     gabapentin  600 mg Oral QPM     heparin lock flush  5-10 mL Intracatheter Q24H     metoprolol tartrate  12.5 mg Oral BID     tamsulosin  0.4 mg Oral Daily     vancomycin (VANCOCIN) IV  1,500 mg Intravenous Q12H       Discussed with Nursing, Cardiology and Patient today

## 2020-05-30 NOTE — PROGRESS NOTES
"Cardiology Progress Note          Assessment and Plan:   Hemorrhagic shock secondary to delayed failed angioseal of left femoral artery post angiogram with PCI x4 to RCA. Required 4 units and fortunately stopped after repositioned of Femstop. Did not required vascular repair. This am hgb 7.9. it was 9 yesterday. Eliquis was started yesterday in lieu of ASA. On plavix  -will check hgb later today. Transfuse for hgb <7   -hold Eliquis now. If no active bleed will start ASA instead as Eliquis has an antidote but it is quite expensive    CAD - s/p PCI of RCA as above    Long lasting persistent AF since Dec on rate control. Rate is less than 100 at rest but up to 140 with ambulation. Given anemia this may be compensatory tachy response. As long as resting HR <100 continue with current dig and bb    Aortic stenosis - area only 1.3 cm with mean grad of 22 but suspect underestimation, pt is currently eval for TAVR    H/o CVA in 2015 no residual neurol deficit. Ok to be off Eliquis until confirmation of no rebleeding.                 Interval History:   Pain over left femoral and scrotum due large extrasvastion of blood from LFA. Not bigger.              Review of Systems:   As per subjective, otherwise 5 systems reviewed and negative.           Physical Exam:   Blood pressure 92/63, pulse 87, temperature 97.8  F (36.6  C), resp. rate 16, height 1.702 m (5' 7\"), weight 75.5 kg (166 lb 6.4 oz), SpO2 96 %.          Intake/Output Summary (Last 24 hours) at 5/30/2020 1400  Last data filed at 5/30/2020 1100  Gross per 24 hour   Intake 1435 ml   Output 725 ml   Net 710 ml       Constitutional:   NAD   Skin:   Warm and dry   Head:   Nontraumatic   Neck:   Supple, symmetrical, trachea midline, no adenopathy, thyroid symmetric, not enlarged and no tenderness, skin normal   Lungs:   normal   Cardiovascular:   irregularly irregular rhythm murmurs include systolic murmur II/VI located at right upper sternal border without radiation "   Abdomen:   Benign   Extremities and Back:   Symmetric, no curvature, spinous processes are non-tender on palpation, paraspinous muscles are non-tender on palpation, no costal vertebral tenderness   Neurological:   Grossly nonfocal            Medications:       apixaban ANTICOAGULANT  5 mg Oral BID     atorvastatin  40 mg Oral Daily     cefTRIAXone  1 g Intravenous Q24H     clopidogrel  75 mg Oral Daily     digoxin  125 mcg Oral Daily     gabapentin  600 mg Oral QPM     heparin lock flush  5-10 mL Intracatheter Q24H     metoprolol tartrate  12.5 mg Oral BID     tamsulosin  0.4 mg Oral Daily     vancomycin (VANCOCIN) IV  1,500 mg Intravenous Q12H     Office Visit on 05/16/2020   Component Date Value Ref Range Status     COVID-19 Virus PCR to [facility] - So* 05/16/2020 Nasopharyngeal   Final     COVID-19 Virus PCR to [facility] - Re* 05/16/2020 Not Detected   Final    Comment: Collection of multiple specimens from the same patient may be necessary to   detect the virus. The possibility of a false negative should be considered if   the patient's recent exposure or clinical presentation suggests 2019 nCOV   infection and diagnostic tests for other causes of illness are negative.   Repeat testing may be considered in this setting.  Viral RNA was extracted via a validated method and subsequently underwent   single step reverse transcriptase-real time polymerase chain reaction using   primers to the CDC specified N1,N2 gene targets of CoV2 and human RNP as an   internal control.  A negative result does not rule out the presence of real-time PCR inhibitors   in the specimen or COVID-19 RNA in concentrations below the limit of detection   of the assay. The possibility of a false negative should be considered if the   patients recent exposure or clinical presentation suggests COVID-19.   Additional testing or repeat testing requires consultation with the   laborator                           y.  Nasopharyngeal specimen is the  preferred choice for swab-based SARS CoV2   testing. When collection of a nasopharyngeal swab is not possible the   following are acceptable alternatives:  an oropharyngeal (OP) specimen collected by a healthcare professional, or a   nasal mid-turbinate (NMT) swab collected by a healthcare professional or by   onsite self-collection (using a flocked tapered swab), or an anterior nares   specimen collected by a healthcare professional or by onsite self-collection   (using a round foam swab). (Centers for Disease Control)  Testing performed by [facility], Room [address]210, [address] [place] [address].ï   This test was developed and its   performance characteristics determined by the [facility]   [place]. It has not been cleared or approved by the FDA.  The laboratory is regulated under the Clinical Laboratory Improvement   Amendments of 1988 (CLIA-88) as qualified to perform high-complexity                            testing.   This test is used for clinical purposes. It should not be regarded as   investigational or for research.                    Stiven Song MD

## 2020-05-30 NOTE — PROGRESS NOTES
[facility]    Medicine Progress Note - Hospitalist Service       Date of Admission:  5/18/2020  Assessment & Plan   Efrem Ramos is a 74 year old male with a past medical history of obstructive sleep apnea, hyperlipidemia, CVA, A. fib on chronic anticoagulation with Eliquis, coronary artery disease, mitral stenosis, and severe aortic stenosis who was admitted on 5/18/2020 following complications after an elective heart catheterization with stent placement and rotational artherectomy of RCA in anticipation of TAVR.  Postprocedure, patient was identified to have persistent cath site bleeding with development of massive hematoma and associated hemorrhagic shock.  Patient was identified to have active extravasation from the left common femoral artery. Hemostasis was achieved with ultrasound-guided direct pressure. He was resuscitated and admitted to the ICU with ongoing cares.  Once off of pressors patient transferred out of ICU/hospitalist contacted for resumption of care.    Postprocedure massive hematoma, left groin/scrotum  Hemorrhagic shock, resolved  Acute blood loss anemia  Postprocedure, developed acute hypotension with rapid development of groin/scrotal hematoma. In setting of chronic AC with Eliquis for afib, had been held for 3d in anticipation of procedure. Required initiation of massive transfusion protocol, pressor support.  - CTA A/P revealed active extravasation from left common femoral artery into left groin with extensive hemorrhage. Vascular surgery was urgently consulted, femstop repositioned and hemostasis achieved with direct ultrasound-guided pressure. Duplex ultrasound performed after direct pressure indicated resolution of active extravasation. Pt received a total of 4u PRBCs, last transfusion 5/21 for Hgb 7.8 and persistent hypotension.  Posttransfusion hemoglobin was 9.7, fluctuating between 8.6 and 9.8 since.  Hemoglobin stable.  -Continue to monitor daily Hgb  - Transfuse  PRN Hgb < 7  - Was on DAPT with ASA/Plavix given recent PCI.  This patient's hemoglobin has been stable for last several days cardiology started Eliquis and discontinued aspirin (5/25)  continue Plavix.  Drift down in hemoglobin, persistent pain.  Repeat CT, no active bleeding..  - Urology consulted given scrotal hematoma, scrotal support & elevation, PVRs have been wnl, Urology reconsulted, started on Flomax and recommending to monitor  -Low iron studies, IV iron x2.  -Increasing pain, appreciate general surgery consultation who recommended conservative management/local care.    Start low dose PRN oxycodone, close monitor for any excess sedation on narcotics, monitor bowels.  Serial monitor hemoglobin, have remained stable. USN recurrent pseudoaneurysm, residual hematoma left groin.    Chronic Afib with RVR  Hx afib on chronic AC with Eliquis. PTA maintained on metoprolol for rate control.   - episodic RVR with activity.  Heart rate bumps up up to 140-150 with minimal activity secondary to severe aortic stenosis.  -PTA metoprolol resumed at a lower dose 12.5 twice daily 5/24/2020.  Started on  digoxin temporarily for rate control during this hospitalization   Cardiology following and adjusting his medication, appreciate input  -Eliquis restarted by Cardiology, close monitor.  -Emergent cardiac catheterization 5/28/2020 secondary to acute hypotension, tachycardia, lactate firing.  Final report pending, verbal report from Cardiology,   cites  EF WNL, gradient across the aortic valve not as severe as had been supposed with TAVR candidacy.  Recommend restart on metoprolol 12.5 mg twice daily with hold parameters, continues on dig; restarted on Eliquis postprocedure, per Cardiology..      Hypoxia  -Patient started needing oxygen; titrate to O2 sat 90%;  IV fluid discontinued,  -Monitor closely, incentive spirometry    CAD s/p PCIx4 to RCA (5/18/2020)  Hypertension  Hyperlipidemia  Pt with identified RCA disease  "on initial angiogram for TAVR workup performed 3/23/20, however due to uncertainty on repair approach (surgical v percutaneous) of aortic valve, RCA was not intervened upon at that time. CT TAVR was subsequently performed and indicated favorable anatomy for TAVR. Subsequently underwent angiogram 5/18/2020 with complex PCI, adjunctive rotational arthrectomy with DESx4 from proximal to distal RCA.  - Continues on DAPT with apixaban/Plavix given recent stent  -Restarted low-dose metoprolol 5/24/2020, dose adjustment per cardiology, see \"A. fib \"above.  Restarted Eliquis per Cardiology post catheterization.     Severe Aortic Stenosis  Undergoing workup for eventual outpatient TAVR.  -TAVR on hold related to severe hematoma/anemia.  -5/28/2020 acute hypotension and tachycardia fired lactate, elevated 3.3, see separate Note.  Hemoglobin stable, BP soft but stable.  Cardiology has evaluated patient, concern for impending cardiogenic shock given severe aortic stenosis, plan for cardiac catherization;  verbal report from Cardiology,  [formal result report pending]  cites  EF WNL, gradient across the aortic valve not as severe as had been supposed with TAVR candidacy.  Recommend restart on metoprolol 12.5 mg twice daily with hold parameters,restart Eliquis post cath per Cardiology..     Leukocytosis, lactic acidosis with hypotension, tachycardia  Bilateral pulmonary infiltrates  CXR new bilateral interstitial infiltrates suggesting infection or edema.  Given concerns of possible early sepsis firing sepsis protocol with lactic at 3.3, patient started on broad-spectrum antibiotics, given penicillin allergy elected ceftriaxone and vancomycin.  Ordered sputum to narrow antibiotics.  Nursing concerned of aspiration, ST to rule out aspiration.  Close monitor.  -Discussed with Cardiology, CXR Radiology interpretation infection vs edema.  Laboratory studies with elevated BNP suggest more so pulmonary volume excess, " Cardiology reluctant to diuresis given aortic stenosis.  As Patient has significantly improved on IV antibiotics with downtrending WBC, marked improvement in patient's clinical status/cognition, and with normal procalcitonin leaning to  short course of antibiotics, ie 5 days.     MEL  - CPAP with home settings    Physical deconditioning  -OT PT.        Diet: Snacks/Supplements Adult: Boost Plus; Between Meals  Combination Diet Full Liquid Diet; Thin Liquids (water, ice chips, juice, milk, gelatin, ice cream, etc) (CHIN TUCK)    DVT Prophylaxis: Pneumatic Compression Devices  Bravo Catheter: not present  Code Status: Full Code           Disposition Plan   Expected discharge: TBD pending Cardiology recommendations with complex medical condition and labile hemodynamic status.      Entered: Larisa Carrillo MD 05/30/2020, 3:22 PM      Larisa Carrillo MD  Hospitalist Service  [facility]    ______________________________________________________________________    Interval History   Patient more alert, near his baseline, initiates conversation, appropriate.  Reports continued scrotal and back pain, although improved, Nursing states patient has taken low-dose oxycodone, in the past he preferred only Tylenol.  No dyspnea, cough, sputum.     Data reviewed today: I reviewed all medications, new labs and imaging results over the last 24 hours.    Physical Exam   Vital Signs: Temp: 97.8  F (36.6  C) Temp src: Oral BP: 92/63 Pulse: 87   Resp: 16 SpO2: 96 % O2 Device: Nasal cannula Oxygen Delivery: 2 LPM  Weight: 166 lbs 6.4 oz      Constitutional: NAD, more alert [near baseline] initiates conversation, appropriate  Head: Atraumatic no masses, lesions, tenderness or abnormalities  ENT: Buccal mucosa moist.  Cardiovascular: Irregular, 2/6 murmur.  Respiratory: No rales or wheeze.  Respirations nonlabored.  Gastrointestinal: Abdomen soft, non-tender.  No rebound guarding or other peritoneal signs.  :  Extensive ecchymoses across lower abdomen, suprapubic region.  2/4 scrotal edema bilaterally.  Extremities : No edema , no clubbing or cyanosis, bilateral hammertoe.  Neurologic, gross motor testing intact, nonfocal  Psych: Affect calm.      Data   Recent Labs   Lab 05/30/20  0545 05/29/20  1310 05/29/20  0620 05/28/20  1412 05/28/20  0625   WBC 12.1*  --  17.9*  --  12.4*   HGB 7.9*  --  9.0* 9.1* 9.0*   MCV 98  --  97  --  97     --  388  --  353     --  138  --  138   POTASSIUM 3.6  --  4.1  --  3.4   CHLORIDE 109  --  109  --  110*   CO2 24  --  24  --  24   BUN 7  --  6*  --  8   CR 0.69  --  0.67  --  0.65*   ANIONGAP 5  --  5  --  4   ULISSES 7.4*  --  7.7*  --  7.4*   GLC 77  --  87  --  85   ALBUMIN  --  2.1*  --  2.1*  --    PROTTOTAL  --  6.1*  --  6.3*  --    BILITOTAL  --  1.4*  --  1.3  --    ALKPHOS  --  71  --  88  --    ALT  --  15  --  15  --    AST  --  28  --  27  --      No results found for this or any previous visit (from the past 24 hour(s)).  Medications     - MEDICATION INSTRUCTIONS -       - MEDICATION INSTRUCTIONS -       - MEDICATION INSTRUCTIONS -       - MEDICATION INSTRUCTIONS -       Percutaneous Coronary Intervention orders placed (this is information for BPA alerting)       ACE/ARB/ARNI NOT PRESCRIBED         atorvastatin  40 mg Oral Daily     cefTRIAXone  1 g Intravenous Q24H     clopidogrel  75 mg Oral Daily     digoxin  125 mcg Oral Daily     gabapentin  600 mg Oral QPM     heparin lock flush  5-10 mL Intracatheter Q24H     metoprolol tartrate  12.5 mg Oral BID     tamsulosin  0.4 mg Oral Daily     vancomycin (VANCOCIN) IV  1,500 mg Intravenous Q12H       Discussed with Nursing and Patient today

## 2020-05-31 ENCOUNTER — APPOINTMENT (OUTPATIENT)
Dept: GENERAL RADIOLOGY | Facility: CLINIC | Age: 75
DRG: 246 | End: 2020-05-31
Attending: HOSPITALIST
Payer: MEDICARE

## 2020-05-31 ENCOUNTER — APPOINTMENT (OUTPATIENT)
Dept: OCCUPATIONAL THERAPY | Facility: CLINIC | Age: 75
DRG: 246 | End: 2020-05-31
Payer: MEDICARE

## 2020-05-31 ENCOUNTER — APPOINTMENT (OUTPATIENT)
Dept: SPEECH THERAPY | Facility: CLINIC | Age: 75
DRG: 246 | End: 2020-05-31
Payer: MEDICARE

## 2020-05-31 LAB
BASOPHILS # BLD AUTO: 0.1 10E9/L (ref 0–0.2)
BASOPHILS NFR BLD AUTO: 1 %
CREAT SERPL-MCNC: 0.61 MG/DL (ref 0.66–1.25)
DIFFERENTIAL METHOD BLD: ABNORMAL
EOSINOPHIL # BLD AUTO: 0.6 10E9/L (ref 0–0.7)
EOSINOPHIL NFR BLD AUTO: 4.1 %
ERYTHROCYTE [DISTWIDTH] IN BLOOD BY AUTOMATED COUNT: 17 % (ref 10–15)
GFR SERPL CREATININE-BSD FRML MDRD: >90 ML/MIN/{1.73_M2}
HCT VFR BLD AUTO: 26.7 % (ref 40–53)
HGB BLD-MCNC: 8.7 G/DL (ref 13.3–17.7)
IMM GRANULOCYTES # BLD: 0.2 10E9/L (ref 0–0.4)
IMM GRANULOCYTES NFR BLD: 1.3 %
LACTATE BLD-SCNC: 1 MMOL/L (ref 0.7–2)
LYMPHOCYTES # BLD AUTO: 1.8 10E9/L (ref 0.8–5.3)
LYMPHOCYTES NFR BLD AUTO: 13 %
MCH RBC QN AUTO: 32.2 PG (ref 26.5–33)
MCHC RBC AUTO-ENTMCNC: 32.6 G/DL (ref 31.5–36.5)
MCV RBC AUTO: 99 FL (ref 78–100)
MONOCYTES # BLD AUTO: 1.3 10E9/L (ref 0–1.3)
MONOCYTES NFR BLD AUTO: 9.2 %
MRSA DNA SPEC QL NAA+PROBE: NEGATIVE
NEUTROPHILS # BLD AUTO: 9.8 10E9/L (ref 1.6–8.3)
NEUTROPHILS NFR BLD AUTO: 71.4 %
NRBC # BLD AUTO: 0 10*3/UL
NRBC BLD AUTO-RTO: 0 /100
PLATELET # BLD AUTO: 422 10E9/L (ref 150–450)
POTASSIUM SERPL-SCNC: 3.6 MMOL/L (ref 3.4–5.3)
RBC # BLD AUTO: 2.7 10E12/L (ref 4.4–5.9)
SPECIMEN SOURCE: NORMAL
VANCOMYCIN SERPL-MCNC: 21.3 MG/L
WBC # BLD AUTO: 13.7 10E9/L (ref 4–11)

## 2020-05-31 PROCEDURE — 74230 X-RAY XM SWLNG FUNCJ C+: CPT

## 2020-05-31 PROCEDURE — 25000132 ZZH RX MED GY IP 250 OP 250 PS 637: Mod: GY | Performed by: PHYSICIAN ASSISTANT

## 2020-05-31 PROCEDURE — 84132 ASSAY OF SERUM POTASSIUM: CPT

## 2020-05-31 PROCEDURE — 21000001 ZZH R&B HEART CARE

## 2020-05-31 PROCEDURE — 85025 COMPLETE CBC W/AUTO DIFF WBC: CPT

## 2020-05-31 PROCEDURE — 87640 STAPH A DNA AMP PROBE: CPT | Performed by: HOSPITALIST

## 2020-05-31 PROCEDURE — 92610 EVALUATE SWALLOWING FUNCTION: CPT | Mod: GN | Performed by: SPEECH-LANGUAGE PATHOLOGIST

## 2020-05-31 PROCEDURE — 92526 ORAL FUNCTION THERAPY: CPT | Mod: GN | Performed by: SPEECH-LANGUAGE PATHOLOGIST

## 2020-05-31 PROCEDURE — 25000132 ZZH RX MED GY IP 250 OP 250 PS 637: Mod: GY | Performed by: UROLOGY

## 2020-05-31 PROCEDURE — 25000132 ZZH RX MED GY IP 250 OP 250 PS 637: Mod: GY | Performed by: INTERNAL MEDICINE

## 2020-05-31 PROCEDURE — 25000128 H RX IP 250 OP 636: Performed by: PHYSICIAN ASSISTANT

## 2020-05-31 PROCEDURE — 97110 THERAPEUTIC EXERCISES: CPT | Mod: GO | Performed by: OCCUPATIONAL THERAPIST

## 2020-05-31 PROCEDURE — 25000128 H RX IP 250 OP 636: Performed by: INTERNAL MEDICINE

## 2020-05-31 PROCEDURE — 99232 SBSQ HOSP IP/OBS MODERATE 35: CPT | Performed by: INTERNAL MEDICINE

## 2020-05-31 PROCEDURE — 99233 SBSQ HOSP IP/OBS HIGH 50: CPT | Performed by: HOSPITALIST

## 2020-05-31 PROCEDURE — 82565 ASSAY OF CREATININE: CPT

## 2020-05-31 PROCEDURE — 92611 MOTION FLUOROSCOPY/SWALLOW: CPT | Mod: GN | Performed by: SPEECH-LANGUAGE PATHOLOGIST

## 2020-05-31 PROCEDURE — 80202 ASSAY OF VANCOMYCIN: CPT

## 2020-05-31 PROCEDURE — 25800030 ZZH RX IP 258 OP 636: Performed by: HOSPITALIST

## 2020-05-31 PROCEDURE — 87641 MR-STAPH DNA AMP PROBE: CPT | Performed by: HOSPITALIST

## 2020-05-31 PROCEDURE — 83605 ASSAY OF LACTIC ACID: CPT | Performed by: HOSPITALIST

## 2020-05-31 PROCEDURE — 25000132 ZZH RX MED GY IP 250 OP 250 PS 637: Mod: GY | Performed by: HOSPITALIST

## 2020-05-31 PROCEDURE — 25000128 H RX IP 250 OP 636: Performed by: HOSPITALIST

## 2020-05-31 RX ORDER — GABAPENTIN 600 MG/1
600 TABLET ORAL EVERY EVENING
Status: DISCONTINUED | OUTPATIENT
Start: 2020-05-31 | End: 2020-06-04 | Stop reason: HOSPADM

## 2020-05-31 RX ORDER — ASPIRIN 81 MG/1
81 TABLET ORAL DAILY
Status: DISCONTINUED | OUTPATIENT
Start: 2020-05-31 | End: 2020-06-04 | Stop reason: HOSPADM

## 2020-05-31 RX ORDER — DIGOXIN 125 MCG
250 TABLET ORAL DAILY
Status: DISCONTINUED | OUTPATIENT
Start: 2020-06-01 | End: 2020-06-04 | Stop reason: HOSPADM

## 2020-05-31 RX ORDER — SODIUM CHLORIDE 9 MG/ML
INJECTION, SOLUTION INTRAVENOUS CONTINUOUS
Status: ACTIVE | OUTPATIENT
Start: 2020-05-31 | End: 2020-05-31

## 2020-05-31 RX ORDER — CEFTRIAXONE 2 G/1
2 INJECTION, POWDER, FOR SOLUTION INTRAMUSCULAR; INTRAVENOUS EVERY 24 HOURS
Status: DISCONTINUED | OUTPATIENT
Start: 2020-05-31 | End: 2020-06-04 | Stop reason: HOSPADM

## 2020-05-31 RX ORDER — DIGOXIN 125 MCG
250 TABLET ORAL ONCE
Status: COMPLETED | OUTPATIENT
Start: 2020-05-31 | End: 2020-05-31

## 2020-05-31 RX ADMIN — Medication 5 ML: at 10:47

## 2020-05-31 RX ADMIN — TAMSULOSIN HYDROCHLORIDE 0.4 MG: 0.4 CAPSULE ORAL at 08:41

## 2020-05-31 RX ADMIN — ASPIRIN 81 MG: 81 TABLET, DELAYED RELEASE ORAL at 13:33

## 2020-05-31 RX ADMIN — Medication 5 ML: at 13:09

## 2020-05-31 RX ADMIN — SODIUM CHLORIDE 75 ML: 9 INJECTION, SOLUTION INTRAVENOUS at 16:30

## 2020-05-31 RX ADMIN — DIGOXIN 250 MCG: 125 TABLET ORAL at 13:33

## 2020-05-31 RX ADMIN — SODIUM CHLORIDE, PRESERVATIVE FREE 5 ML: 5 INJECTION INTRAVENOUS at 05:41

## 2020-05-31 RX ADMIN — METOPROLOL TARTRATE 12.5 MG: 25 TABLET, FILM COATED ORAL at 08:41

## 2020-05-31 RX ADMIN — ACETAMINOPHEN 650 MG: 325 TABLET, FILM COATED ORAL at 13:33

## 2020-05-31 RX ADMIN — VANCOMYCIN HYDROCHLORIDE 1500 MG: 5 INJECTION, POWDER, LYOPHILIZED, FOR SOLUTION INTRAVENOUS at 11:30

## 2020-05-31 RX ADMIN — CLOPIDOGREL BISULFATE 75 MG: 75 TABLET, FILM COATED ORAL at 08:41

## 2020-05-31 RX ADMIN — Medication 5 ML: at 23:10

## 2020-05-31 RX ADMIN — CEFTRIAXONE 2 G: 2 INJECTION, POWDER, FOR SOLUTION INTRAMUSCULAR; INTRAVENOUS at 08:40

## 2020-05-31 RX ADMIN — POTASSIUM CHLORIDE 20 MEQ: 29.8 INJECTION, SOLUTION INTRAVENOUS at 07:01

## 2020-05-31 RX ADMIN — Medication 5 ML: at 11:32

## 2020-05-31 RX ADMIN — ATORVASTATIN CALCIUM 40 MG: 40 TABLET, FILM COATED ORAL at 08:41

## 2020-05-31 RX ADMIN — GABAPENTIN 600 MG: 600 TABLET, FILM COATED ORAL at 21:44

## 2020-05-31 RX ADMIN — DIGOXIN 125 MCG: 125 TABLET ORAL at 08:41

## 2020-05-31 ASSESSMENT — MIFFLIN-ST. JEOR: SCORE: 1452.03

## 2020-05-31 NOTE — PROGRESS NOTES
[facility]    Medicine Progress Note - Hospitalist Service       Date of Admission:  5/18/2020  Assessment & Plan   Efrem Ramos is a 74 year old male with a past medical history of obstructive sleep apnea, hyperlipidemia, CVA, A. fib on chronic anticoagulation with Eliquis, coronary artery disease, mitral stenosis, and severe aortic stenosis who was admitted on 5/18/2020 following complications after an elective heart catheterization with stent placement and rotational artherectomy of RCA in anticipation of TAVR.  Postprocedure, patient was identified to have persistent cath site bleeding with development of massive hematoma and associated hemorrhagic shock.  Patient was identified to have active extravasation from the left common femoral artery. Hemostasis was achieved with ultrasound-guided direct pressure. He was resuscitated and admitted to the ICU with ongoing cares.  Once off of pressors patient transferred out of ICU/hospitalist contacted for resumption of care.    Postprocedure massive hematoma, left groin/scrotum  Hemorrhagic shock, resolved  Acute blood loss anemia  Postprocedure, developed acute hypotension with rapid development of groin/scrotal hematoma. In setting of chronic AC with Eliquis for afib, had been held for 3d in anticipation of procedure. Required initiation of massive transfusion protocol, pressor support.  - CTA A/P revealed active extravasation from left common femoral artery into left groin with extensive hemorrhage. Vascular surgery was urgently consulted, femstop repositioned and hemostasis achieved with direct ultrasound-guided pressure. Pt received a total of 4u PRBCs, last transfusion 5/21 for Hgb 7.8 and persistent hypotension.  Posttransfusion hemoglobin was 9.7, fluctuating between 7.9-8.9 since, today at 8.7.  Monitor  -Continue to monitor daily Hgb  - Transfuse PRN Hgb < 7  - Was on DAPT with ASA/Plavix given recent PCI.  This patient's hemoglobin has been  stable for last several days cardiology started Eliquis and discontinued aspirin (5/25)  continue Plavix.  Drift down in hemoglobin, persistent pain.  Repeat CT, no active bleeding..  - Urology consulted given scrotal hematoma, scrotal support & elevation, PVRs have been wnl, Urology reconsulted, started on Flomax and recommending to monitor  -Low iron studies, IV iron x2.  -Increasing pain, appreciate general surgery consultation who recommended conservative management/local care.    Start low dose PRN oxycodone, close monitor for any excess sedation on narcotics, monitor bowels.    - USN no recurrent pseudoaneurysm, residual hematoma left groin.  -Apixaban held 5/28/2020 prior to urgent heart catheterization.  Note per cardiology NP 5/29/2020 cites restart Eliquis, however this was not done.  Rounding Cardiologist this weekend has held Eliquis, please see note 5/31/2020, instead elected to restart aspirin.  Hemoglobin today 8.7; range 7.8-9.0.  Defer to Cardiology.    Chronic Afib with RVR  Hx afib on chronic AC with Eliquis. PTA maintained on metoprolol for rate control.   - episodic RVR with activity.  Heart rate bumps up up to 140-150 with minimal activity secondary to severe aortic stenosis.  -PTA metoprolol resumed at a lower dose 12.5 twice daily 5/24/2020.  Started on  digoxin temporarily for rate control during this hospitalization   Cardiology following and adjusting his medication, appreciate input  -Emergent cardiac catheterization 5/28/2020 secondary to acute hypotension, tachycardia, lactate firing.  Final report pending, verbal report from Cardiology,   cites  EF WNL, gradient across the aortic valve not as severe as had been supposed with TAVR candidacy.  Recommend restart on metoprolol 12.5 mg twice daily with hold parameters, continues on dig; restart on Eliquis postprocedure, per Cardiology.  See above; Note per cardiology NP 5/29/2020 cites restart Eliquis, however this was not done.   "Rounding Cardiologist this weekend has held Eliquis, please see note 5/31/2020, instead elected to restart aspirin.  Hemoglobin today 8.7; range 7.8-9.0.  Defer to Cardiology.    Hypoxia  - titrate to O2 sat 90%;  IV fluid discontinued,  -Monitor closely, incentive spirometry    CAD s/p PCIx4 to RCA (5/18/2020)  Hypertension  Hyperlipidemia  Pt with identified RCA disease on initial angiogram for TAVR workup performed 3/23/20, however due to uncertainty on repair approach (surgical v percutaneous) of aortic valve, RCA was not intervened upon at that time. CT TAVR was subsequently performed and indicated favorable anatomy for TAVR. Subsequently underwent angiogram 5/18/2020 with complex PCI, adjunctive rotational arthrectomy with DESx4 from proximal to distal RCA.  -  DAPT with apixaban/Plavix given recent stent  -Restarted low-dose metoprolol 5/24/2020, dose adjustment per cardiology, see \"A. fib \"above.  Restarted Eliquis per Cardiology post catheterization; see above.      Severe Aortic Stenosis  Undergoing workup for eventual outpatient TAVR.  -TAVR on hold related to severe hematoma/anemia.  -5/28/2020 acute hypotension and tachycardia fired lactate, elevated 3.3, see separate Note.  Hemoglobin stable, BP soft but stable.  Cardiology has evaluated patient, concern for impending cardiogenic shock given severe aortic stenosis, plan for cardiac catherization;  verbal report from Cardiology,  [formal result report pending]  cites  EF WNL, gradient across the aortic valve not as severe as had been supposed with TAVR candidacy.  Recommend restart on metoprolol 12.5 mg twice daily with hold parameters,restart Eliquis post cath per Cardiology, see above.     Leukocytosis, lactic acidosis with hypotension, tachycardia  Bilateral pulmonary infiltrates  CXR new bilateral interstitial infiltrates suggesting infection or edema.  Given concerns of possible early sepsis firing sepsis protocol with lactic at 3.3, " patient started on broad-spectrum antibiotics, given penicillin allergy elected ceftriaxone and vancomycin.  Ordered sputum to narrow antibiotics.  Nursing concerned of aspiration, ST to rule out aspiration.  Close monitor.  -Discussed with Cardiology, CXR Radiology interpretation infection vs edema.  Laboratory studies with elevated BNP suggest more so pulmonary volume excess, Cardiology reluctant to diuresis given aortic stenosis.  As Patient has significantly improved on IV antibiotics with downtrending WBC, marked improvement in patient's clinical status/cognition, and with normal procalcitonin leaning to short course of antibiotics, ie 5 days.  MRSA nasal swab today negative, DC Vanco, continue cefuroxime as above.    MEL  - CPAP with home settings    Physical deconditioning  -OT PT.        Diet: Snacks/Supplements Adult: Boost Plus; Between Meals  Combination Diet Dysphagia Diet Level 1: Pureed; Thin Liquids (water, ice chips, juice, milk gelatin, ice cream, etc) (NO straws and NO chin tuck. )  Room Service    DVT Prophylaxis: Pneumatic Compression Devices  Bravo Catheter: not present  Code Status: Full Code           Disposition Plan   Expected discharge: TBD pending Cardiology recommendations with complex medical condition and labile hemodynamic status.    Entered: Larisa Carrillo MD 05/31/2020, 1:06 PM    Larisa Carrillo MD  Hospitalist Service  [facility]    ______________________________________________________________________    Interval History   Patient is more alert, appears stronger, up in sitting in chair, conversive.  Persistent pain primarily in the scrotum, serially decreased edema.  No new or change symptoms.  No cough, sputum, afebrile.     Data reviewed today: I reviewed all medications, new labs and imaging results over the last 24 hours.    Physical Exam   Vital Signs: Temp: 97.9  F (36.6  C) Temp src: Oral BP: (!) 138/95 Pulse: 127 Heart Rate: 111 Resp: 17 SpO2: 100  % O2 Device: Nasal cannula Oxygen Delivery: 1 LPM  Weight: 168 lbs 4.8 oz      Constitutional: NAD, more alert [near baseline] initiates conversation, appropriate  Head: Atraumatic   ENT: Buccal mucosa moist.  Cardiovascular: Irregular, 2/6 murmur.  Respiratory: No rales or wheeze.  Respirations nonlabored.  Gastrointestinal: Abdomen soft, non-tender.  No rebound guarding or other peritoneal signs.  : Extensive ecchymoses across lower abdomen, suprapubic region, serially decreased;.  2/4 scrotal edema bilaterally, decreasing.  Extremities : No edema , no clubbing or cyanosis, bilateral hammertoe.  Neurologic, gross motor testing intact, nonfocal  Psych: Affect calm.      Data   Recent Labs   Lab 05/31/20  0535 05/30/20  1645 05/30/20  0545 05/29/20  1310 05/29/20  0620 05/28/20  1412 05/28/20  0625   WBC 13.7*  --  12.1*  --  17.9*  --  12.4*   HGB 8.7* 8.9* 7.9*  --  9.0* 9.1* 9.0*   MCV 99  --  98  --  97  --  97     --  375  --  388  --  353   NA  --   --  138  --  138  --  138   POTASSIUM 3.6  --  3.6  --  4.1  --  3.4   CHLORIDE  --   --  109  --  109  --  110*   CO2  --   --  24  --  24  --  24   BUN  --   --  7  --  6*  --  8   CR 0.61*  --  0.69  --  0.67  --  0.65*   ANIONGAP  --   --  5  --  5  --  4   ULISSES  --   --  7.4*  --  7.7*  --  7.4*   GLC  --   --  77  --  87  --  85   ALBUMIN  --   --   --  2.1*  --  2.1*  --    PROTTOTAL  --   --   --  6.1*  --  6.3*  --    BILITOTAL  --   --   --  1.4*  --  1.3  --    ALKPHOS  --   --   --  71  --  88  --    ALT  --   --   --  15  --  15  --    AST  --   --   --  28  --  27  --      No results found for this or any previous visit (from the past 24 hour(s)).  Medications     - MEDICATION INSTRUCTIONS -       - MEDICATION INSTRUCTIONS -       - MEDICATION INSTRUCTIONS -       - MEDICATION INSTRUCTIONS -       Percutaneous Coronary Intervention orders placed (this is information for BPA alerting)       ACE/ARB/ARNI NOT PRESCRIBED         aspirin  81 mg  Oral Daily     atorvastatin  40 mg Oral Daily     cefTRIAXone  2 g Intravenous Q24H     clopidogrel  75 mg Oral Daily     [START ON 6/1/2020] digoxin  250 mcg Oral Daily     digoxin  250 mcg Oral Once     gabapentin  600 mg Oral QPM     heparin lock flush  5-10 mL Intracatheter Q24H     metoprolol tartrate  12.5 mg Oral BID     tamsulosin  0.4 mg Oral Daily     vancomycin (VANCOCIN) IV  1,250 mg Intravenous Q12H       Discussed with Nursing and Patient today

## 2020-05-31 NOTE — PLAN OF CARE
Discharge Planner SLP   Patient plan for discharge: Not addressed.   Current status: Video swallow study completed. Patient presents with moderate dysphagia on today's study. Deficits include mild holding of the bolus, decreased oral control with premature entry of to the level of the pyriform sinuses with thin liquids and past the epiglottis with nectar thick liquids. Reduced laryngeal elevation and contraction. These deficits resulted in premature entry of thin liquids to the pyriform sinuses with minimal flash penetration with thin liquids via the cup. Chin tuck there was increased depth of the penetration. No penetration via the cup with nectar thick liquids. Delay with pudding and semi-solids with vallecular and post pharyngeal wall residue. That reduced with an additional swallow and liquid rinse, but not fully cleared. He has a prominent crico pharyngeus muscle without obstruction of the flow. There was no aspiration during this study.   Recommend: 1. Advance diet to DDL 1 and continue with thin liquids. 2. Up in a chair for all meals and 30 minutes after. No straws, small single sips, hard double swallow and alternate liquids/solids. Continue to crush medication and place in apple sauce. If coughing with thin liquids change to nectar. 3. GI consult as an OP to further assess esophageal dysfunction. 4. SLP will follow up for diet tolerance and advancement as indicated.   Barriers to return to prior living situation: Dysphagia and deconditioning.   Recommendations for discharge: TCU  Rationale for recommendations: Patient will need on going ST need for dysphagia management and diet advancement as indicated. OP GI consult. Patient is below his baseline.        Entered by: Do Issa 05/31/2020 1:22 PM

## 2020-05-31 NOTE — PROGRESS NOTES
"   05/31/20 1108   General Information   Onset Date 05/18/20   Start of Care Date 05/31/20   Referring Physician Larisa Carrillo MD    Patient Profile Review/OT: Additional Occupational Profile Info See Profile for full history and prior level of function   Patient/Family Goals Statement Patient did not state.    Swallowing Evaluation Videofluoroscopic evaluation   Behaviorial Observations Alert   Mode of current nutrition Oral diet   Type of oral diet   (Full liquids thin.)   Respiratory Status O2 Supply   Type of O2 supply Nasal cannula   Comments \"Efrem Ramos is a 74 year old male with past medical history significant for MEL, PVD, HLD, CVA 2015, atrial fibrillation, severe aortic stenosis and mild to moderate mitral valve stenosis admitted on 5/18/2020. He underwent and elective cardiac catheterization and RCA intervention prior to TAVR. 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 Latest VFSS in 2018 found oropharyngeal and sx of esophageal dysphagia with cricopharyngeal prominence. EG at this time also found Benign-appearing esophageal stenosis and ?esophageal spasm.    VFSS Eval: Radiology   Radiologist Dr. Gorman   Views Taken left lateral   Physical Location of Procedure [facility]   VFSS Eval: Thin Liquid Texture Trial   Mode of Presentation, Thin Liquid cup;spoon;self-fed;fed by clinician   Order of Presentation 3 4 5 7 9 10   Preparatory Phase Holding   Oral Phase, Thin Liquid Premature pharyngeal entry   Pharyngeal Phase, Thin Liquid Delayed swallow reflex;Residue in valleculae;Residue in pyriform sinus   Rosenbek's Penetration Aspiration Scale: Thin Liquid Trial Results 2 - contrast " enters airway, remains above the vocal cords, no residue remains (penetration)   Successful Strategies Trialed During Procedure, Thin Liquid chin tuck  (Made the penetration deeper. )   Diagnostic Statement Flash penetration of the vestibule.    VFSS Eval: Nectar Thick Liquid Texture Trial   Mode of Presentation, Nectar cup;spoon;self-fed;fed by clinician   Order of Presentation 1 2    Preparatory Phase Holding   Oral Phase, Nectar Premature pharyngeal entry   Pharyngeal Phase, Nectar Residue in valleculae;Residue in pyriform sinus   Rosenbek's Penetration Aspiration Scale: Nectar-Thick Liquid Trial Results 1 - no aspiration, contrast does not enter airway   VFSS Eval: Puree Solid Texture Trial   Mode of Presentation, Puree spoon;self-fed   Order of Presentation 6   Preparatory Phase WFL   Oral Phase, Puree Poor AP movement;Residue in oral cavity;Premature pharyngeal entry   Pharyngeal Phase, Puree Delayed swallow reflex;Pharyngeal wall coating;Residue in valleculae;Residue in pyriform sinus   Rosenbek's Penetration Aspiration Scale: Puree Food Trial Results 1 - no aspiration, contrast does not enter airway   Diagnostic Statement Post pharyngeal wall, vallecular residue mild to moderate amount.    VFSS Eval: Semisolid Texture Trial   Mode of Presentation, Semisolid spoon;self-fed   Order of Presentation 8   Preparatory Phase WFL   Oral Phase, Semisolid Poor AP movement;Residue in oral cavity;Premature pharyngeal entry   Pharyngeal Phase, Semisolid Delayed swallow reflex;Pharyngeal wall coating;Residue in valleculae   Rosenbek's Penetration Aspiration Scale: Semisolid Food Trial Results 1 - no aspiration, contrast does not enter airway   Diagnostic Statement Delay and mild/moderate vallecular and post pharyngeal wall residue.    Esophageal Phase of Swallow   Patient reports or presents with symptoms of esophageal dysphagia Yes   Esophageal comments Prominent crico pharyngeus muscle.    Swallow Eval: Clinical  Impressions   Skilled Criteria for Therapy Intervention Skilled criteria met.  Treatment indicated.   Functional Assessment Scale (FAS) 3   Dysphagia Outcome Severity Scale (GONZALEZ) Level 3 - GONZALEZ   Diet texture recommendations Dysphagia diet level 1;Thin liquids   Recommended Feeding/Eating Techniques alternate between small bites and sips of food/liquid;hard swallow w/ each bite or sip;maintain upright posture during/after eating for 30 mins;small sips/bites;no straws  (Swallow x3)   Therapy Frequency Daily   Predicted Duration of Therapy Intervention (days/wks) 1 week   Anticipated Discharge Disposition inpatient rehabilitation facility   Risks and Benefits of Treatment have been explained. Yes   Patient, family and/or staff in agreement with Plan of Care Yes   Clinical Impression Comments Patient presents with moderate dysphagia on today's study. Deficits include mild holding of the bolus, decreased oral control with premature entry of to the level of the pyriform sinuses with thin liquids and past the epiglottis with nectar thick liquids. Reduced laryngeal elevation and contraction. These deficits resulted in premature entry of thin liquids to the pyriform sinuses with minimal flash penetration with thin liquids via the cup. Chin tuck there was increased depth of the penetration. No penetration via the cup with nectar thick liquids. Delay with pudding and semi-solids with vallecular and post pharyngeal wall residue. That reduced with an additional swallow and liquid rinse, but not fully cleared. He has a prominent crico pharyngeus muscle without obstruction of the flow. There was no aspiration during this study. Recommend: 1. Advance diet to DDL 1 and continue with thin liquids. 2. Up in a chair for all meals and 30 minutes after. No straws, small single sips, hard double swallow and alternate liquids/solids. Continue to crush medication and place in apple sauce. 3. GI consult as an OP to further assess  esophageal dysfunction.    Total Evaluation Time   Total Evaluation Time (Minutes) 20

## 2020-05-31 NOTE — PROVIDER NOTIFICATION
MD Notification    Notified Person: MD    Notified Person Name:  Lorena    Notification Date/Time:  5/31, 1530    Notification Interaction:  Text page     Purpose of Notification: FYI, pts blood pressure softer this afternoon, SBP high 70s-80s/50s,  pt asymptomatic with this.      Orders Received: NS bolus of 500, will complete and continue to monitor.     Comments:       23-Dec-2019 03:37

## 2020-05-31 NOTE — PLAN OF CARE
Low BPs this afternoon, fluid bolus of 500/5hours started, c/o back pain and scrotal pain--prn tylenol given, more tired today, swallow study completed diet changed to pureed with thin liquids, speech to follow up, lactic fired-results pending, mrsa negative, vanco d/c'd, will need probable TCU but pt is declining at this time.  Will continue to monitor closely.

## 2020-05-31 NOTE — PLAN OF CARE
Discharge Planner OT   Patient plan for discharge: TCU  Current status: Pt had just transferred bed to chair with Burt CAMPOS of nurse. Pt agreeable to do light exercises while up in chair, with encouragement. Completed 3 UE and 2 LE AROM exercises with cues and demo. HR up to 170 with very minimal activity. SOB on supplemental O2; cued for pursed lip breathing. Pt reports too fatigued to do more.  Barriers to return to prior living situation: medical status, very limited activity tolerance, impaired swallowing, current level of assist  Recommendations for discharge: TCU  Rationale for recommendations: Pt would benefit from continued therapy at an inpt setting to maximize (I), tolerance, safety with ADL, IADL and mobility. Does not appear safe or appropriate for discharge home at this time.       Entered by: Hannah Bernard 05/31/2020 8:40 AM

## 2020-05-31 NOTE — PROGRESS NOTES
"Cardiac Electrophysiology Progress Note          Assessment and Plan:   Hemorrhagic shock secondary to delayed failed angioseal of left femoral artery post angiogram with PCI x4 to RCA. Required 4 units and fortunately stopped after repositioned of Femstop. Did not required vascular repair. hgb stabilized now. Transfuse for hgb <7   -hold Eliquis now.  will start ASA instead as Eliquis has an antidote but it is quite expensive     CAD - s/p PCI of RCA as above     Long lasting persistent AF since Dec on rate control. Rate is less than 100 at rest but up to 140 with ambulation or minimal movement. Some of it from scrotum pain. Increase digoxin to 250mcg daily for now along with one time dose of 250 mcg digoxin today. Normal renal function and bp too low to increase bb. Will wait for a week to restart Eliquis and stop ASA due to higher risk of bleeding than cva risk if start it now.     Aortic stenosis - area only 1.3 cm with mean grad of 22 but suspect underestimation, pt is currently eval for TAVR, obviously will defer oupt eval in the future.     H/o CVA in 2015 no residual neurol deficit. Ok to be off Eliquis until confirmation of no rebleeding.    Need tcu pt pt declines. Swallowing test today.                Interval History:   No sob but pain from scrotum              Review of Systems:   As per subjective, otherwise 5 systems reviewed and negative.           Physical Exam:   Blood pressure (!) 138/95, pulse 127, temperature 97.9  F (36.6  C), temperature source Oral, resp. rate 17, height 1.702 m (5' 7\"), weight 76.3 kg (168 lb 4.8 oz), SpO2 100 %.          Intake/Output Summary (Last 24 hours) at 5/31/2020 1259  Last data filed at 5/31/2020 1100  Gross per 24 hour   Intake 665 ml   Output 1850 ml   Net -1185 ml       Constitutional:   NAD   Skin:   Warm and dry   Head:   Nontraumatic   Neck:   Supple, symmetrical, trachea midline, no adenopathy, thyroid symmetric, not enlarged and no tenderness, skin normal "   Lungs:   normal   Cardiovascular:   irregularly irregular rhythm murmurs include systolic murmur II/VI located at right upper sternal border without radiation   Abdomen:   Benign   Extremities and Back:   Symmetric, no curvature, spinous processes are non-tender on palpation, paraspinous muscles are non-tender on palpation, no costal vertebral tenderness   Neurological:   Grossly nonfocal            Medications:       atorvastatin  40 mg Oral Daily     cefTRIAXone  2 g Intravenous Q24H     clopidogrel  75 mg Oral Daily     [START ON 6/1/2020] digoxin  250 mcg Oral Daily     digoxin  250 mcg Oral Once     gabapentin  600 mg Oral QPM     heparin lock flush  5-10 mL Intracatheter Q24H     metoprolol tartrate  12.5 mg Oral BID     tamsulosin  0.4 mg Oral Daily     vancomycin (VANCOCIN) IV  1,250 mg Intravenous Q12H     Office Visit on 05/16/2020   Component Date Value Ref Range Status     COVID-19 Virus PCR to [facility] - So* 05/16/2020 Nasopharyngeal   Final     COVID-19 Virus PCR to [facility] - Re* 05/16/2020 Not Detected   Final    Comment: Collection of multiple specimens from the same patient may be necessary to   detect the virus. The possibility of a false negative should be considered if   the patient's recent exposure or clinical presentation suggests 2019 nCOV   infection and diagnostic tests for other causes of illness are negative.   Repeat testing may be considered in this setting.  Viral RNA was extracted via a validated method and subsequently underwent   single step reverse transcriptase-real time polymerase chain reaction using   primers to the CDC specified N1,N2 gene targets of CoV2 and human RNP as an   internal control.  A negative result does not rule out the presence of real-time PCR inhibitors   in the specimen or COVID-19 RNA in concentrations below the limit of detection   of the assay. The possibility of a false negative should be considered if the   patients recent exposure or clinical  presentation suggests COVID-19.   Additional testing or repeat testing requires consultation with the   laborator                           y.  Nasopharyngeal specimen is the preferred choice for swab-based SARS CoV2   testing. When collection of a nasopharyngeal swab is not possible the   following are acceptable alternatives:  an oropharyngeal (OP) specimen collected by a healthcare professional, or a   nasal mid-turbinate (NMT) swab collected by a healthcare professional or by   onsite self-collection (using a flocked tapered swab), or an anterior nares   specimen collected by a healthcare professional or by onsite self-collection   (using a round foam swab). (Centers for Disease Control)  Testing performed by [facility] [place], Room [address], [address] [place] [address].ï   This test was developed and its   performance characteristics determined by the [facility]   [place]. It has not been cleared or approved by the FDA.  The laboratory is regulated under the Clinical Laboratory Improvement   Amendments of 1988 (CLIA-88) as qualified to perform high-complexity                            testing.   This test is used for clinical purposes. It should not be regarded as   investigational or for research.                    Stiven Song MD

## 2020-05-31 NOTE — PLAN OF CARE
A&Ox4, forgetful. BPs stable, VSS on 2L NC. Tele: Afib; HR 90s-low 100s at rest, tachycardic with activity. Continues to have moderate-severe edema to scrotum, hips, and groin. Signficant bruising to groin. Left radial and right brachial site slightly bruised, CDI, CMS intact. Voiding adequatley. R IJ heparin locked. Tolerates pills crushed in applesauce. Needs barium swallow study.

## 2020-05-31 NOTE — PHARMACY-VANCOMYCIN DOSING SERVICE
Pharmacy Vancomycin Note  Date of Service May 31, 2020  Patient's  1945   74 year old, male    Indication: Healthcare-Associated Pneumonia  Goal Trough Level: 15-20 mg/L  Day of Therapy: 3  Current Vancomycin regimen: 1500 mg IV q12h    Current estimated CrCl = Estimated Creatinine Clearance: 111.2 mL/min (A) (based on SCr of 0.61 mg/dL (L)).    Creatinine for last 3 days  2020:  6:20 AM Creatinine 0.67 mg/dL  2020:  5:45 AM Creatinine 0.69 mg/dL  2020:  5:35 AM Creatinine 0.61 mg/dL    Recent Vancomycin Levels (past 3 days)  2020: 10:49 AM Vancomycin Level 21.3 mg/L    Vancomycin IV Administrations (past 72 hours)                   vancomycin 1500 mg in 0.9% NaCl 250 ml intermittent infusion 1,500 mg (mg) 1,500 mg Given 20 1130     1,500 mg Given 20 2201     1,500 mg Given  1100     1,500 mg Given 20 2210     1,500 mg Given  1026                Nephrotoxins and other renal medications (From now, onward)    Start     Dose/Rate Route Frequency Ordered Stop    20 2330  vancomycin 1250 mg in 0.9% NaCl 250 mL intermittent infusion 1,250 mg      1,250 mg  over 90 Minutes Intravenous EVERY 12 HOURS 20 1143               Contrast Orders - past 72 hours (72h ago, onward)    Start     Dose/Rate Route Frequency Ordered Stop    20 1000  barium sulfate (EZ-DISK) tablet 700 mg  Status:  Discontinued      700 mg Oral ONCE 20 0954 20 1013    20 1000  barium sulfate 40% (VARIBAR HONEY or NECTAR) oral suspension  Status:  Discontinued       Oral ONCE 20 0954 20 1013    20 1000  barium sulfate 40% (VARIBAR HONEY or NECTAR) oral suspension       Oral ONCE 20 0954 20 1010    20 1000  barium sulfate (VARIBAR) 40 % pudding/paste 5 mL      5 mL Oral ONCE 20 0954 20 1010    20 1000  barium sulfate (VARIBAR THIN Liquid) 40 % oral suspension 2 g      5 mL Oral ONCE 20 0954 20 1006    20 1543   iopamidol (ISOVUE-370) solution  Status:  Discontinued        ONCE PRN 05/28/20 1543 05/28/20 1607          Interpretation of levels and current regimen:  Trough level is  Supratherapeutic    Has serum creatinine changed > 50% in last 72 hours: No    Urine output:  good urine output    Renal Function: Stable    Plan:  1.  Decrease Dose to 1250 mg q12h  2.  Pharmacy will check trough levels as appropriate in 1-3 Days.    3. Serum creatinine levels will be ordered daily for the first week of therapy and at least twice weekly for subsequent weeks.      Patrica Boss, PharmD, BCPS      .

## 2020-06-01 ENCOUNTER — APPOINTMENT (OUTPATIENT)
Dept: SPEECH THERAPY | Facility: CLINIC | Age: 75
DRG: 246 | End: 2020-06-01
Payer: MEDICARE

## 2020-06-01 LAB
ANION GAP SERPL CALCULATED.3IONS-SCNC: 6 MMOL/L (ref 3–14)
BASOPHILS # BLD AUTO: 0.1 10E9/L (ref 0–0.2)
BASOPHILS NFR BLD AUTO: 0.9 %
BUN SERPL-MCNC: 9 MG/DL (ref 7–30)
CALCIUM SERPL-MCNC: 7.6 MG/DL (ref 8.5–10.1)
CHLORIDE SERPL-SCNC: 111 MMOL/L (ref 94–109)
CO2 SERPL-SCNC: 24 MMOL/L (ref 20–32)
CREAT SERPL-MCNC: 0.82 MG/DL (ref 0.66–1.25)
DIFFERENTIAL METHOD BLD: ABNORMAL
EOSINOPHIL # BLD AUTO: 0.5 10E9/L (ref 0–0.7)
EOSINOPHIL NFR BLD AUTO: 3.9 %
ERYTHROCYTE [DISTWIDTH] IN BLOOD BY AUTOMATED COUNT: 17.5 % (ref 10–15)
GFR SERPL CREATININE-BSD FRML MDRD: 85 ML/MIN/{1.73_M2}
GLUCOSE SERPL-MCNC: 87 MG/DL (ref 70–99)
HCT VFR BLD AUTO: 26.6 % (ref 40–53)
HGB BLD-MCNC: 8.4 G/DL (ref 13.3–17.7)
IMM GRANULOCYTES # BLD: 0.1 10E9/L (ref 0–0.4)
IMM GRANULOCYTES NFR BLD: 1.1 %
LACTATE BLD-SCNC: 1.1 MMOL/L (ref 0.7–2)
LYMPHOCYTES # BLD AUTO: 1.7 10E9/L (ref 0.8–5.3)
LYMPHOCYTES NFR BLD AUTO: 13.4 %
MCH RBC QN AUTO: 31.3 PG (ref 26.5–33)
MCHC RBC AUTO-ENTMCNC: 31.6 G/DL (ref 31.5–36.5)
MCV RBC AUTO: 99 FL (ref 78–100)
MONOCYTES # BLD AUTO: 1.4 10E9/L (ref 0–1.3)
MONOCYTES NFR BLD AUTO: 10.7 %
NEUTROPHILS # BLD AUTO: 9 10E9/L (ref 1.6–8.3)
NEUTROPHILS NFR BLD AUTO: 70 %
NRBC # BLD AUTO: 0 10*3/UL
NRBC BLD AUTO-RTO: 0 /100
PLATELET # BLD AUTO: 438 10E9/L (ref 150–450)
POTASSIUM SERPL-SCNC: 3.4 MMOL/L (ref 3.4–5.3)
RBC # BLD AUTO: 2.68 10E12/L (ref 4.4–5.9)
SODIUM SERPL-SCNC: 141 MMOL/L (ref 133–144)
WBC # BLD AUTO: 12.8 10E9/L (ref 4–11)

## 2020-06-01 PROCEDURE — 25000128 H RX IP 250 OP 636: Performed by: HOSPITALIST

## 2020-06-01 PROCEDURE — 99232 SBSQ HOSP IP/OBS MODERATE 35: CPT | Performed by: INTERNAL MEDICINE

## 2020-06-01 PROCEDURE — 80048 BASIC METABOLIC PNL TOTAL CA: CPT | Performed by: HOSPITALIST

## 2020-06-01 PROCEDURE — 25000132 ZZH RX MED GY IP 250 OP 250 PS 637: Mod: GY | Performed by: INTERNAL MEDICINE

## 2020-06-01 PROCEDURE — 83605 ASSAY OF LACTIC ACID: CPT | Performed by: INTERNAL MEDICINE

## 2020-06-01 PROCEDURE — 25000132 ZZH RX MED GY IP 250 OP 250 PS 637: Mod: GY | Performed by: PHYSICIAN ASSISTANT

## 2020-06-01 PROCEDURE — 25000132 ZZH RX MED GY IP 250 OP 250 PS 637: Mod: GY | Performed by: HOSPITALIST

## 2020-06-01 PROCEDURE — 85025 COMPLETE CBC W/AUTO DIFF WBC: CPT | Performed by: HOSPITALIST

## 2020-06-01 PROCEDURE — 21000001 ZZH R&B HEART CARE

## 2020-06-01 PROCEDURE — 25000132 ZZH RX MED GY IP 250 OP 250 PS 637: Mod: GY | Performed by: UROLOGY

## 2020-06-01 PROCEDURE — 99233 SBSQ HOSP IP/OBS HIGH 50: CPT | Performed by: INTERNAL MEDICINE

## 2020-06-01 PROCEDURE — 25000128 H RX IP 250 OP 636: Performed by: PHYSICIAN ASSISTANT

## 2020-06-01 PROCEDURE — 92526 ORAL FUNCTION THERAPY: CPT | Mod: GN | Performed by: SPEECH-LANGUAGE PATHOLOGIST

## 2020-06-01 PROCEDURE — 25000128 H RX IP 250 OP 636: Performed by: INTERNAL MEDICINE

## 2020-06-01 RX ADMIN — ASPIRIN 81 MG: 81 TABLET, DELAYED RELEASE ORAL at 08:14

## 2020-06-01 RX ADMIN — CLOPIDOGREL BISULFATE 75 MG: 75 TABLET, FILM COATED ORAL at 08:15

## 2020-06-01 RX ADMIN — CEFTRIAXONE 2 G: 2 INJECTION, POWDER, FOR SOLUTION INTRAMUSCULAR; INTRAVENOUS at 10:07

## 2020-06-01 RX ADMIN — Medication 5 ML: at 21:18

## 2020-06-01 RX ADMIN — ATORVASTATIN CALCIUM 40 MG: 40 TABLET, FILM COATED ORAL at 08:15

## 2020-06-01 RX ADMIN — SODIUM CHLORIDE, PRESERVATIVE FREE 10 ML: 5 INJECTION INTRAVENOUS at 06:13

## 2020-06-01 RX ADMIN — METOPROLOL TARTRATE 12.5 MG: 25 TABLET, FILM COATED ORAL at 08:15

## 2020-06-01 RX ADMIN — ACETAMINOPHEN 650 MG: 325 TABLET, FILM COATED ORAL at 21:04

## 2020-06-01 RX ADMIN — POTASSIUM CHLORIDE 20 MEQ: 29.8 INJECTION, SOLUTION INTRAVENOUS at 07:04

## 2020-06-01 RX ADMIN — TAMSULOSIN HYDROCHLORIDE 0.4 MG: 0.4 CAPSULE ORAL at 08:14

## 2020-06-01 RX ADMIN — DIGOXIN 250 MCG: 125 TABLET ORAL at 08:14

## 2020-06-01 RX ADMIN — GABAPENTIN 600 MG: 600 TABLET, FILM COATED ORAL at 21:04

## 2020-06-01 ASSESSMENT — MIFFLIN-ST. JEOR: SCORE: 1443.87

## 2020-06-01 NOTE — PROGRESS NOTES
SW:  D:  Call placed to update Anna as to patient's status and potential for discharge on Tuesday or Wednesday.  P:  Will continue to follow.      DAVIE Shell, [facility]  Lead   [phone]  [facility]

## 2020-06-01 NOTE — PLAN OF CARE
Discharge Planner SLP   Patient plan for discharge: Did not discuss  Current status: Swallow Tx was provided this am.  Pt was fatigued and reported poor appetite.  Pt accepted limited thin liquid and pudding trials with mod cues to use safe swallow strategies.  Pt did not consistently follow cues and took large sips of thin liquids.  Slight wet voice and SOB were observed.    Recommend increased precautions with a DDL1 and thin liquids. Recommend nursing reminders for use of safe swallow strategies including:  pt to sit up in a chair/at 90 degrees for all meals and 30 minutes after. No straws, small single sips, hard double swallow and alternate liquids/solids. Continue to crush medication and place in apple sauce. If coughing with thin liquids change to nectar.     Recommend GI consult as an OP to further assess esophageal dysfunction.    SLP will follow up for diet tolerance and advancement as indicated.   Barriers to return to prior living situation: Dysphagia and deconditioning.   Recommendations for discharge: TCU  Rationale for recommendations: Patient will need on going ST need for dysphagia management and diet advancement as indicated. OP GI consult. Patient is below his baseline.          Entered by: Kae Acevedo 06/01/2020 9:40 AM

## 2020-06-01 NOTE — PROGRESS NOTES
[facility]    Medicine Progress Note - Hospitalist Service       Date of Admission:  5/18/2020  Assessment & Plan   Efrem Ramos is a 74 year old male with a past medical history of obstructive sleep apnea, hyperlipidemia, CVA, A. fib on chronic anticoagulation with Eliquis, coronary artery disease, mitral stenosis, and severe aortic stenosis who was admitted on 5/18/2020 following complications after an elective heart catheterization with stent placement and rotational artherectomy of RCA in anticipation of TAVR.  Postprocedure, patient was identified to have persistent cath site bleeding with development of massive hematoma and associated hemorrhagic shock.  Patient was identified to have active extravasation from the left common femoral artery. Hemostasis was achieved with ultrasound-guided direct pressure. He was resuscitated and admitted to the ICU with ongoing cares.  Once off of pressors patient transferred out of ICU/hospitalist contacted for resumption of care.    Postprocedure massive hematoma, left groin/scrotum  Hemorrhagic shock, resolved  Acute blood loss anemia  Postprocedure, developed acute hypotension with rapid development of groin/scrotal hematoma. In setting of chronic AC with Eliquis for afib, had been held for 3d in anticipation of procedure. Required initiation of massive transfusion protocol, pressor support.  - CTA A/P revealed active extravasation from left common femoral artery into left groin with extensive hemorrhage. Vascular surgery was urgently consulted, femstop repositioned and hemostasis achieved with direct ultrasound-guided pressure. Pt received a total of 4u PRBCs, last transfusion 5/21 for Hgb 7.8 and persistent hypotension.  Posttransfusion hemoglobin was 9.7, fluctuating between 7.9-8.9 since, today at 8.7.  Monitor  -Continue to monitor daily Hgb  - Transfuse PRN Hgb < 7  - Was on DAPT with ASA/Plavix given recent PCI.  This patient's hemoglobin has been  stable for last several days cardiology started Eliquis and discontinued aspirin (5/25)  continue Plavix.  Drift down in hemoglobin, persistent pain.  Repeat CT, no active bleeding.. On 5/29/2020 Eliquis was restarted, over the weekend on 5/30 Eliquis was on hold, plan to continue on holding that until bleeding risk is much reduced.  - Urology consulted given scrotal hematoma, scrotal support & elevation, PVRs have been wnl, Urology reconsulted, started on Flomax and recommending to monitor  -Low iron studies, IV iron x2.  -Increasing pain, appreciate general surgery consultation who recommended conservative management/local care.    Start low dose PRN oxycodone, close monitor for any excess sedation on narcotics, monitor bowels.    - USN no recurrent pseudoaneurysm, residual hematoma left groin.  -Hemoglobin today stable at 8.4    Chronic Afib with RVR  Hx afib on chronic AC with Eliquis. PTA maintained on metoprolol for rate control.   - episodic RVR with activity.  Heart rate bumps up up to 140-150 with minimal activity secondary to severe aortic stenosis.  -PTA metoprolol resumed at a lower dose 12.5 twice daily 5/24/2020.  Started on  digoxin temporarily for rate control during this hospitalization   Cardiology following and adjusting his medication, appreciate input  -Emergent cardiac catheterization 5/28/2020 secondary to acute hypotension, tachycardia, lactate firing.  Final report pending, verbal report from Cardiology,   cites  EF WNL, gradient across the aortic valve not as severe as had been supposed with TAVR candidacy.  Recommend restart on metoprolol 12.5 mg twice daily with hold parameters, continues on dig; restart on Eliquis postprocedure, per Cardiology.  See above; Note per cardiology NP 5/29/2020 cites restart Eliquis, however this was not done.  Rounding Cardiologist this weekend has held Eliquis, please see note 5/31/2020, instead elected to restart aspirin.   -Cardiology planning to  hold off on Eliquis for now, restart following outpatient follow-up, digoxin levels have been increased to 250 mcg, heart rate is only controlled at rest quite uncontrolled with the minimal movement, blood pressures are low to titrate beta-blockers.    Hypoxia  - titrate to O2 sat 90%;  IV fluid discontinued,  -Monitor closely, incentive spirometry    CAD s/p PCIx4 to RCA (5/18/2020)  Hypertension  Hyperlipidemia  Pt with identified RCA disease on initial angiogram for TAVR workup performed 3/23/20, however due to uncertainty on repair approach (surgical v percutaneous) of aortic valve, RCA was not intervened upon at that time. CT TAVR was subsequently performed and indicated favorable anatomy for TAVR. Subsequently underwent angiogram 5/18/2020 with complex PCI, adjunctive rotational arthrectomy with DESx4 from proximal to distal RCA.  -  DAPT with apixaban/Plavix given recent stent  -Restarted low-dose metoprolol 5/24/2020, see above     Severe Aortic Stenosis  Undergoing workup for eventual outpatient TAVR.  -TAVR on hold related to severe hematoma/anemia.  -5/28/2020 acute hypotension and tachycardia fired lactate, elevated 3.3, see separate Note.  Hemoglobin stable, BP soft but stable.  Cardiology has evaluated patient, concern for impending cardiogenic shock given severe aortic stenosis, plan for cardiac catherization;  verbal report from Cardiology,  [formal result report pending]  cites  EF WNL, gradient across the aortic valve not as severe as had been supposed with TAVR candidacy.-TAVR follow-up as per cardiology    Leukocytosis, lactic acidosis with hypotension, tachycardia  Bilateral pulmonary infiltrates  CXR new bilateral interstitial infiltrates suggesting infection or edema.  Given concerns of possible early sepsis firing sepsis protocol with lactic at 3.3, patient started on broad-spectrum antibiotics, given penicillin allergy elected ceftriaxone and vancomycin.  Ordered sputum to narrow  antibiotics.  Nursing concerned of aspiration, ST to rule out aspiration.  Close monitor.  -Discussed with Cardiology, CXR Radiology interpretation infection vs edema.  Laboratory studies with elevated BNP suggest more so pulmonary volume excess, Cardiology reluctant to diuresis given aortic stenosis.  As Patient has significantly improved on IV antibiotics with downtrending WBC, marked improvement in patient's clinical status/cognition, and with normal procalcitonin leaning to short course of antibiotics, ie 5 days.  MRSA nasal swab today negative, DC Vanco, continue cefuroxime as above.  Stop Rocephin on June 4    MEL  - CPAP with home settings    Physical deconditioning  -OT PT.        Diet: Snacks/Supplements Adult: Boost Plus; Between Meals  Combination Diet Dysphagia Diet Level 1: Pureed; Thin Liquids (water, ice chips, juice, milk gelatin, ice cream, etc) (NO straws and NO chin tuck. )  Room Service    DVT Prophylaxis: Pneumatic Compression Devices  Bravo Catheter: not present  Code Status: Full Code           Disposition Plan   Expected discharge: Discussed about TCU transfer in 1 to 2 days, patient was first hesitant but after a long conversation he had agreed to pursue that, he is quite anxious about COVID cases in TCU's, he understands that going home would not be the right choice for him.  His wife is also debilitated and will not be able to care for him.    Entered: Mere Jimenez MD 06/01/2020, 2:04 PM  Total time spend 35 min >50% spend on coordination of care including discharge planning and disposition.    Mere Jimenez MD  Hospitalist Service  [facility]    ______________________________________________________________________    Interval History   Patient is resting comfortably, no new bleeding noted, he is quite tachycardic, in A. fib, worse with activity, blood pressures are borderline, no new issues overnight, we discussed at length about discharge disposition.  Data  reviewed today: I reviewed all medications, new labs and imaging results over the last 24 hours.    Physical Exam   Vital Signs: Temp: 98.2  F (36.8  C) Temp src: Oral BP: 120/78 Pulse: 84   Resp: 18 SpO2: 95 % O2 Device: Nasal cannula Oxygen Delivery: 1 LPM  Weight: 166 lbs 8 oz      Constitutional: Awake, alert, cooperative, no apparent distress  Respiratory: Clear to auscultation bilaterally, no crackles or wheezing  Cardiovascular: Regular rate and rhythm, normal S1 and S2, and no murmur noted  GI: Normal bowel sounds, soft, non-distended, left groin area noted, ecchymosis extending into the scrotal area and the external genitalia.    Skin/Integumen: No rashes, no cyanosis, no edema  Neuro : moving all 4 extremities, no focal deficit noted         Data   Recent Labs   Lab 06/01/20  0605 05/31/20  0535 05/30/20  1645 05/30/20  0545 05/29/20  1310 05/29/20  0620 05/28/20  1412   WBC 12.8* 13.7*  --  12.1*  --  17.9*  --    HGB 8.4* 8.7* 8.9* 7.9*  --  9.0* 9.1*   MCV 99 99  --  98  --  97  --     422  --  375  --  388  --      --   --  138  --  138  --    POTASSIUM 3.4 3.6  --  3.6  --  4.1  --    CHLORIDE 111*  --   --  109  --  109  --    CO2 24  --   --  24  --  24  --    BUN 9  --   --  7  --  6*  --    CR 0.82 0.61*  --  0.69  --  0.67  --    ANIONGAP 6  --   --  5  --  5  --    ULISSES 7.6*  --   --  7.4*  --  7.7*  --    GLC 87  --   --  77  --  87  --    ALBUMIN  --   --   --   --  2.1*  --  2.1*   PROTTOTAL  --   --   --   --  6.1*  --  6.3*   BILITOTAL  --   --   --   --  1.4*  --  1.3   ALKPHOS  --   --   --   --  71  --  88   ALT  --   --   --   --  15  --  15   AST  --   --   --   --  28  --  27     No results found for this or any previous visit (from the past 24 hour(s)).  Medications     - MEDICATION INSTRUCTIONS -       - MEDICATION INSTRUCTIONS -       - MEDICATION INSTRUCTIONS -       - MEDICATION INSTRUCTIONS -       Percutaneous Coronary Intervention orders placed (this is information  for BPA alerting)       ACE/ARB/ARNI NOT PRESCRIBED         aspirin  81 mg Oral Daily     atorvastatin  40 mg Oral Daily     cefTRIAXone  2 g Intravenous Q24H     clopidogrel  75 mg Oral Daily     digoxin  250 mcg Oral Daily     gabapentin  600 mg Oral QPM     heparin lock flush  5-10 mL Intracatheter Q24H     metoprolol tartrate  12.5 mg Oral BID     tamsulosin  0.4 mg Oral Daily

## 2020-06-01 NOTE — PROGRESS NOTES
[facility]    Cardiology Progress Note    Date of Admission: 05/18/2020  Service date: 06/01/2020    Summary:  Mr. Efrem Ramos is a very pleasant 74 year old male with a history of MEL, PVD, HLD, CVA 2015, atrial fibrillation, severe aortic stenosis and mild to moderate mitral valve stenosis admitted on 5/18/2020. He underwent and elective cardiac catheterization and RCA intervention as part of an outpatient TAVR workup. Post-procedurally he unfortunately developed persistent bleeding from his left femoral access site and subsequent hemorrhagic shock.     Assessment & Plan   1. Postprocedural shock, hemorrhagic  - Complicated by severe aortic stenosis. Ongoing hypotension requiring NorEpi.   - Now weaned off pressors on 5/21/20 after receiving 1 unit PRBC. BP remain soft but stable.   2. Severe aortic stenosis   - Awaiting TAVR as an outpatient after recovery from recent PCI and hemorrhagic shock.  3. Severe single-vessel right coronary artery disease   - Status post complex PCI with rotational atherectomy and 4 stents to the proximal to distal RCA on 5/18/20.   - Complicated by RFA bleeding into the groin, penis, and scrotum without retroperitoneal bleeding or pseudoaneurysm.   4. Chronic atrial fibrillation   - Anticoagulated on Eliquis PTA with history of stroke. Off Eliquis currently due to bleeding.   - Rate controlled with metoprolol tartrate 12.5 mg BID and digoxin 250 mcg daily, which was increased from 125 mcg daily yesterday. BP too low to allow titration of beta blocker.  - Currently rate controlled except during activity. Control overall improved and adequate with the increased dose of digoxin.   5. Peripheral vascular disease  6. History of CVA in 2015   - No significant residual neurological deficit. Okay to be off Eliquis until confirmation of no re-bleeding.  7. Acute blood loss anemia  - Hgb stabilized now at 8.4 today. Status post transfusions with 4 units of PRBCs  this admission. Transfuse for hgb <7.    Plan:   1. Continue with aspirin and plavix.  2. Continue holding Eliquis for now. Plan to restart Eliquis and stop aspirin once bleeding risk has subsided and anemia improved.   3. Continue digoxin 250 mcg daily and metoprolol tartrate 12.5 mg BID for rate control.   4. Wean off oxygen as able.  5. TAVR not yet scheduled. He should recover well from this episode prior to TAVR if possible.   6. Possible discharge tomorrow pending placement. Patient needs TCU due to deconditioning and dysphagia but has been declining this due to concern with COVID-19. After discussion today, patient noted that his home has a lot of narrow hallways and would not be ideal for home with home care and PT/OT so he would be agreeable to going to a TCU to help rehab and get his strength back.   7. Will arrange for follow up with a Cardiology [facility] in 1-2 weeks post discharge with a digoxin level beforehand.     Disposition Plan   Expected discharge in 0-2 days to transitional care unit pending placement.     Entered: Pedro Grimes 06/01/2020, 12:10 PM     Interval History   Patient is resting comfortably. He continues to feel winded quite easily with minimal activity and with groin pain with movement. His rate control with A.Fib is improved today on the increased dose of digoxin. He otherwise denies chest pain, palpitations, or dizziness. He has intermittently been oxygen, currently on 1.5 L by nasal cannula.     Telemetry: A.Fib with HRs around  bpm    Thank you for the opportunity to participate in this pleasant patient's care.     Seth Grimes, APRN, CNP   Text Page  (8am - 5pm, M-F)    Patient Active Problem List   Diagnosis     Encephalopathy     Mixed hyperlipidemia     Other specified disorder of skin     MEL (obstructive sleep apnea)     PVD (peripheral vascular disease) (H)     Decreased diffusion capacity     Anemia     Vocal cord cancer (H)     Lower extremity edema     Lymphocytic  colitis     Mitral valve problem     IgG monoclonal gammopathy     CARDIOVASCULAR SCREENING; LDL GOAL LESS THAN 100     Vitamin B12 deficiency disease     Elevated ferritin     Collagenous colitis     Redundant colon     Rash and nonspecific skin eruption     Cerebral infarction (H)     Alcohol abuse     Atrial fibrillation and flutter (H)     Health Care Home     Atrial fibrillation (H)     Hypoxia     Acute respiratory failure with hypoxia (H)     Physical deconditioning     Urinary retention     Community acquired pneumonia     Constipation     Pneumonia     CAD (coronary artery disease)-moderate  nonobstructive 2 vessel     Hypokalemia     Non-traumatic compression fracture of L1 lumbar vertebra with routine healing, subsequent encounter     Age-related osteoporosis with current pathological fracture with routine healing, subsequent encounter     Benign essential hypertension     Coronary artery disease involving native coronary artery of native heart with angina pectoris (H)     Other secondary pulmonary hypertension (H)     Edema due to malnutrition, due to unspecified malnutrition type (H)     Abnormal findings on diagnostic imaging of heart and coronary circulation     Coronary artery disease involving native coronary artery of native heart without angina pectoris     Status post coronary angiogram     Bleeding     Nonrheumatic aortic valve stenosis     Cardiogenic shock (H)       Physical Exam   Temp: 98.2  F (36.8  C) Temp src: Oral BP: 120/78 Pulse: 84   Resp: 18 SpO2: 95 % O2 Device: Nasal cannula Oxygen Delivery: 1 LPM  Vitals:    05/30/20 0600 05/31/20 0542 06/01/20 0645   Weight: 75.5 kg (166 lb 6.4 oz) 76.3 kg (168 lb 4.8 oz) 75.5 kg (166 lb 8 oz)     Vital Signs with Ranges  Temp:  [98  F (36.7  C)-98.3  F (36.8  C)] 98.2  F (36.8  C)  Pulse:  [] 84  Resp:  [16-18] 18  BP: ()/(44-79) 120/78  SpO2:  [92 %-98 %] 95 %  I/O last 3 completed shifts:  In: 1907.5 [P.O.:820; I.V.:1087.5]  Out:  1850 [Urine:1850]    Constitutional: Pleasant elderly gentleman, well nourished, and in no acute distress.  Eyes: Pupils equal, round. Sclerae anicteric.   HEENT: Normocephalic, atraumatic.   Respiratory: Breathing non-labored. Lungs dimished bilaterally. No crackles, wheezes, rhonchi, or rales.  Cardiovascular: Irregularly irregular rhythm, normal rate, grade 2/6 systolic murmur at the LUSB, no rub or gallop. Femoral pulses 2+ bilaterally.  Skin: Warm, dry. Significant healing ecchymosis throughout the groin to both femorals and down to the scrotum.   Musculoskeletal/Extremities: Moves all extremities well and symmetrically. No lower extremity edema bilaterally.  Neurologic: No gross focal deficits. Alert, awake, and oriented to person, place and time.  Psychiatric: Affect appropriate. Mentation normal.    Medications     - MEDICATION INSTRUCTIONS -       - MEDICATION INSTRUCTIONS -       - MEDICATION INSTRUCTIONS -       - MEDICATION INSTRUCTIONS -       Percutaneous Coronary Intervention orders placed (this is information for BPA alerting)       ACE/ARB/ARNI NOT PRESCRIBED         aspirin  81 mg Oral Daily     atorvastatin  40 mg Oral Daily     cefTRIAXone  2 g Intravenous Q24H     clopidogrel  75 mg Oral Daily     digoxin  250 mcg Oral Daily     gabapentin  600 mg Oral QPM     heparin lock flush  5-10 mL Intracatheter Q24H     metoprolol tartrate  12.5 mg Oral BID     tamsulosin  0.4 mg Oral Daily     Data   Results for orders placed or performed during the hospital encounter of 05/18/20 (from the past 24 hour(s))   Lactic acid level STAT   Result Value Ref Range    Lactate for Sepsis Protocol 1.0 0.7 - 2.0 mmol/L   Basic metabolic panel   Result Value Ref Range    Sodium 141 133 - 144 mmol/L    Potassium 3.4 3.4 - 5.3 mmol/L    Chloride 111 (H) 94 - 109 mmol/L    Carbon Dioxide 24 20 - 32 mmol/L    Anion Gap 6 3 - 14 mmol/L    Glucose 87 70 - 99 mg/dL    Urea Nitrogen 9 7 - 30 mg/dL    Creatinine 0.82 0.66 -  1.25 mg/dL    GFR Estimate 85 >60 mL/min/[1.73_m2]    GFR Estimate If Black >90 >60 mL/min/[1.73_m2]    Calcium 7.6 (L) 8.5 - 10.1 mg/dL   CBC with platelets differential   Result Value Ref Range    WBC 12.8 (H) 4.0 - 11.0 10e9/L    RBC Count 2.68 (L) 4.4 - 5.9 10e12/L    Hemoglobin 8.4 (L) 13.3 - 17.7 g/dL    Hematocrit 26.6 (L) 40.0 - 53.0 %    MCV 99 78 - 100 fl    MCH 31.3 26.5 - 33.0 pg    MCHC 31.6 31.5 - 36.5 g/dL    RDW 17.5 (H) 10.0 - 15.0 %    Platelet Count 438 150 - 450 10e9/L    Diff Method Automated Method     % Neutrophils 70.0 %    % Lymphocytes 13.4 %    % Monocytes 10.7 %    % Eosinophils 3.9 %    % Basophils 0.9 %    % Immature Granulocytes 1.1 %    Nucleated RBCs 0 0 /100    Absolute Neutrophil 9.0 (H) 1.6 - 8.3 10e9/L    Absolute Lymphocytes 1.7 0.8 - 5.3 10e9/L    Absolute Monocytes 1.4 (H) 0.0 - 1.3 10e9/L    Absolute Eosinophils 0.5 0.0 - 0.7 10e9/L    Absolute Basophils 0.1 0.0 - 0.2 10e9/L    Abs Immature Granulocytes 0.1 0 - 0.4 10e9/L    Absolute Nucleated RBC 0.0      This note was completed in part using Dragon voice recognition software. Although reviewed after completion, some word and grammatical errors may occur.

## 2020-06-01 NOTE — PLAN OF CARE
Weak. Up with asistance  of  2. Uses walker. Ambulated to bathroom. Sat in chair. Voiding. Wilmer diet.  Agreeable to TCU. SW to see. Scrotal area and hip very bruised

## 2020-06-01 NOTE — PLAN OF CARE
Assumed care [id number]. A&Ox4, c/o pain/tenderness in scrotum with movement. Tele Afib controlled when at rest. SBP soft in 90's.Groin & scrotum ecchymotic and swollen but improved. Up with walker and gait belt, pt needs encouragement to move. Plan to discharge to TCU in next 1-2 days.

## 2020-06-01 NOTE — PLAN OF CARE
BPs in the high 80s-90s systolic last evening, improved after fluid bolus. O2 sats stable on 1-2L NC, unable to wean O2 off this shift. Continues to have edema to scrotum, hips, and groin; significant bruising to groin and hips. Left radial and right brachial site CDI, CMS intact. Voiding. R IJ heparin locked. Tolerating DD1 diet and thin liquids. Tele: afib, rapid with activity. C/o back pain and scrotal pain, denies need for medication. OT recamending TCU at d/c but pt declining at this time.

## 2020-06-02 ENCOUNTER — APPOINTMENT (OUTPATIENT)
Dept: SPEECH THERAPY | Facility: CLINIC | Age: 75
DRG: 246 | End: 2020-06-02
Payer: MEDICARE

## 2020-06-02 ENCOUNTER — APPOINTMENT (OUTPATIENT)
Dept: OCCUPATIONAL THERAPY | Facility: CLINIC | Age: 75
DRG: 246 | End: 2020-06-02
Payer: MEDICARE

## 2020-06-02 ENCOUNTER — APPOINTMENT (OUTPATIENT)
Dept: GENERAL RADIOLOGY | Facility: CLINIC | Age: 75
DRG: 246 | End: 2020-06-02
Attending: INTERNAL MEDICINE
Payer: MEDICARE

## 2020-06-02 LAB
CREAT SERPL-MCNC: 0.9 MG/DL (ref 0.66–1.25)
GFR SERPL CREATININE-BSD FRML MDRD: 82 ML/MIN/{1.73_M2}
LACTATE BLD-SCNC: 0.7 MMOL/L (ref 0.7–2)
POTASSIUM SERPL-SCNC: 3.4 MMOL/L (ref 3.4–5.3)

## 2020-06-02 PROCEDURE — 25000128 H RX IP 250 OP 636: Performed by: INTERNAL MEDICINE

## 2020-06-02 PROCEDURE — 99232 SBSQ HOSP IP/OBS MODERATE 35: CPT | Performed by: INTERNAL MEDICINE

## 2020-06-02 PROCEDURE — 25000132 ZZH RX MED GY IP 250 OP 250 PS 637: Mod: GY | Performed by: UROLOGY

## 2020-06-02 PROCEDURE — 25000132 ZZH RX MED GY IP 250 OP 250 PS 637: Mod: GY | Performed by: INTERNAL MEDICINE

## 2020-06-02 PROCEDURE — 71045 X-RAY EXAM CHEST 1 VIEW: CPT

## 2020-06-02 PROCEDURE — 25800030 ZZH RX IP 258 OP 636: Performed by: NURSE PRACTITIONER

## 2020-06-02 PROCEDURE — 25000128 H RX IP 250 OP 636: Performed by: HOSPITALIST

## 2020-06-02 PROCEDURE — 25000132 ZZH RX MED GY IP 250 OP 250 PS 637: Mod: GY | Performed by: PHYSICIAN ASSISTANT

## 2020-06-02 PROCEDURE — 92526 ORAL FUNCTION THERAPY: CPT | Mod: GN

## 2020-06-02 PROCEDURE — 21000001 ZZH R&B HEART CARE

## 2020-06-02 PROCEDURE — 97110 THERAPEUTIC EXERCISES: CPT | Mod: GO | Performed by: OCCUPATIONAL THERAPIST

## 2020-06-02 PROCEDURE — 84132 ASSAY OF SERUM POTASSIUM: CPT

## 2020-06-02 PROCEDURE — 83605 ASSAY OF LACTIC ACID: CPT | Performed by: INTERNAL MEDICINE

## 2020-06-02 PROCEDURE — 25000132 ZZH RX MED GY IP 250 OP 250 PS 637: Mod: GY | Performed by: HOSPITALIST

## 2020-06-02 PROCEDURE — 82565 ASSAY OF CREATININE: CPT

## 2020-06-02 RX ADMIN — CEFTRIAXONE 2 G: 2 INJECTION, POWDER, FOR SOLUTION INTRAMUSCULAR; INTRAVENOUS at 08:56

## 2020-06-02 RX ADMIN — TAMSULOSIN HYDROCHLORIDE 0.4 MG: 0.4 CAPSULE ORAL at 09:05

## 2020-06-02 RX ADMIN — DIGOXIN 250 MCG: 125 TABLET ORAL at 08:57

## 2020-06-02 RX ADMIN — SODIUM CHLORIDE 250 ML: 9 INJECTION, SOLUTION INTRAVENOUS at 12:42

## 2020-06-02 RX ADMIN — Medication 5 ML: at 05:23

## 2020-06-02 RX ADMIN — ACETAMINOPHEN 650 MG: 325 TABLET, FILM COATED ORAL at 22:21

## 2020-06-02 RX ADMIN — ATORVASTATIN CALCIUM 40 MG: 40 TABLET, FILM COATED ORAL at 08:58

## 2020-06-02 RX ADMIN — ASPIRIN 81 MG: 81 TABLET, DELAYED RELEASE ORAL at 08:58

## 2020-06-02 RX ADMIN — POTASSIUM CHLORIDE 20 MEQ: 1500 TABLET, EXTENDED RELEASE ORAL at 14:18

## 2020-06-02 RX ADMIN — CLOPIDOGREL BISULFATE 75 MG: 75 TABLET, FILM COATED ORAL at 08:58

## 2020-06-02 RX ADMIN — GABAPENTIN 600 MG: 600 TABLET, FILM COATED ORAL at 20:48

## 2020-06-02 RX ADMIN — METOPROLOL TARTRATE 12.5 MG: 25 TABLET, FILM COATED ORAL at 20:47

## 2020-06-02 ASSESSMENT — MIFFLIN-ST. JEOR: SCORE: 1406.22

## 2020-06-02 NOTE — PLAN OF CARE
Discharge Planner OT   Patient plan for discharge: home  Current status: Supine <> sit with MIN A, sit <> stand and ambulate to BR and completed toilet transfer with CGA and cues for safety. Pt dyspneic and HR increasing to 130-150's. Pt requires mod A for LE dress of pull up pad and CGA for toileting hygiene, pt requires cues for PLB and rest breaks due to SOB. After activity Pt's BP decreased to 76/60 and pt reports some lightheadedness, pt assisted back into bed and BP increased to 93/61, nursing present and aware of BP and HR.   Barriers to return to prior living situation: impaired activity tolerance, balance, strength, SOB with min activity, pt's wife unable to A pt.   Recommendations for discharge: TCU  Rationale for recommendations: pt requires increased A with ADL/IADl's and functional mobility and Pt will require daily therapy to increase ADL and functional mobility independence and safety to PLOF. Pt reports he wants to return home and hoping his son can stay with him and his wife as currently staying with his wife. If home will need increased ADl/IADl's IE dressing, tub/shower transfer all IADl's and home RN, PT, OT.       Entered by: Natasha Leyva 06/02/2020 8:59 AM

## 2020-06-02 NOTE — PLAN OF CARE
Full code. A&O x4. L groin and L wrist angiogram sites, ecchymotic. Scrotum edematous, sore, relief with tylenol. Asst 2 walker gait belt. AF CVR/RVR, scheduled lopressor held for SBP <100. LS dim. 1L NC. Q2 turns overnight. Voiding in urinal. Pills crushed in applesauce. Continue to monitor.

## 2020-06-02 NOTE — PROGRESS NOTES
Brief cardiology progress note:  Discussed patient with Dr. Xiong. Will try giving 250 mL fluid bolus to see if patient may be slightly dehydrated contributing to his soft blood pressures. If BP improved with fluid bolus, will plan to titrate metoprolol dose slightly versus add amiodarone to help with rate control with chronic A.Fib. Seth Grimes APRN, CNP

## 2020-06-02 NOTE — PROGRESS NOTES
[facility]    Medicine Progress Note - Hospitalist Service       Date of Admission:  5/18/2020  Assessment & Plan   Efrem Ramos is a 74 year old male with a past medical history of obstructive sleep apnea, hyperlipidemia, CVA, A. fib on chronic anticoagulation with Eliquis, coronary artery disease, mitral stenosis, and severe aortic stenosis who was admitted on 5/18/2020 following complications after an elective heart catheterization with stent placement and rotational artherectomy of RCA in anticipation of TAVR.  Postprocedure, patient was identified to have persistent cath site bleeding with development of massive hematoma and associated hemorrhagic shock.  Patient was identified to have active extravasation from the left common femoral artery. Hemostasis was achieved with ultrasound-guided direct pressure. He was resuscitated and admitted to the ICU with ongoing cares.  Once off of pressors patient transferred out of ICU/hospitalist contacted for resumption of care.    Postprocedure massive hematoma, left groin/scrotum  Hemorrhagic shock, resolved  Acute blood loss anemia  Postprocedure, developed acute hypotension with rapid development of groin/scrotal hematoma. In setting of chronic AC with Eliquis for afib, had been held for 3d in anticipation of procedure. Required initiation of massive transfusion protocol, pressor support.  - CTA A/P revealed active extravasation from left common femoral artery into left groin with extensive hemorrhage. Vascular surgery was urgently consulted, femstop repositioned and hemostasis achieved with direct ultrasound-guided pressure. Pt received a total of 4u PRBCs, last transfusion 5/21 for Hgb 7.8 and persistent hypotension.  Posttransfusion hemoglobin was 9.7, fluctuating between 7.9-8.9 since, today at 8.7.  Monitor  -Continue to monitor daily Hgb  - Transfuse PRN Hgb < 7  - Was on DAPT with ASA/Plavix given recent PCI.  This patient's hemoglobin has been  stable for last several days cardiology started Eliquis and discontinued aspirin (5/25)  continue Plavix.  Drift down in hemoglobin, persistent pain.  Repeat CT, no active bleeding.. On 5/29/2020 Eliquis was restarted, over the weekend on 5/30 Eliquis was on hold, plan to continue on holding that until bleeding risk is much reduced.  - Urology consulted given scrotal hematoma, scrotal support & elevation, PVRs have been wnl, Urology reconsulted, started on Flomax and recommending to monitor  -Low iron studies, IV iron x2.  -Increasing pain, appreciate general surgery consultation who recommended conservative management/local care.    Start low dose PRN oxycodone, close monitor for any excess sedation on narcotics, monitor bowels.    - USN no recurrent pseudoaneurysm, residual hematoma left groin.  -Hemoglobin  stable at 8.4, CBC in a.m.    Chronic Afib with RVR  Hx afib on chronic AC with Eliquis. PTA maintained on metoprolol for rate control.   - episodic RVR with activity.  Heart rate bumps up up to 140-150 with minimal activity secondary to severe aortic stenosis.  -PTA metoprolol resumed at a lower dose 12.5 twice daily 5/24/2020.  Started on  digoxin temporarily for rate control during this hospitalization   Cardiology following and adjusting his medication, appreciate input  -Emergent cardiac catheterization 5/28/2020 secondary to acute hypotension, tachycardia, lactate firing.  Final report pending, verbal report from Cardiology,   cites  EF WNL, gradient across the aortic valve not as severe as had been supposed with TAVR candidacy.  Recommend restart on metoprolol 12.5 mg twice daily with hold parameters, continues on dig; restart on Eliquis postprocedure, per Cardiology.  See above; Note per cardiology NP 5/29/2020 cites restart Eliquis, however this was not done.  Rounding Cardiologist this weekend has held Eliquis, please see note 5/31/2020, instead elected to restart aspirin.   -Cardiology  planning to hold off on Eliquis for now, restart following outpatient follow-up, digoxin levels have been increased to 250 mcg, heart rate is only controlled at rest quite uncontrolled with the minimal movement, blood pressures are low to titrate beta-blockers.  -Patient was symptomatic today including dizziness, his metoprolol dose was on hold, his x-ray still shows pulmonary vascular congestion, blood pressures are on the lower side, cardiology is planning to hydrate him today with small fluid bolus.    Hypoxia  - titrate to O2 sat 90%;  IV fluid discontinued,  -Monitor closely, incentive spirometry  -Patient is currently stable on 2 L of oxygen, he was not using any oxygen prior to this admission, repeat chest x-ray just shows pulmonary vascular congestion along with the right-sided infiltrate.  He is currently on antibiotics.    CAD s/p PCIx4 to RCA (5/18/2020)  Hypertension  Hyperlipidemia  Pt with identified RCA disease on initial angiogram for TAVR workup performed 3/23/20, however due to uncertainty on repair approach (surgical v percutaneous) of aortic valve, RCA was not intervened upon at that time. CT TAVR was subsequently performed and indicated favorable anatomy for TAVR. Subsequently underwent angiogram 5/18/2020 with complex PCI, adjunctive rotational arthrectomy with DESx4 from proximal to distal RCA.  -  DAPT with apixaban/Plavix given recent stent  -Restarted low-dose metoprolol 5/24/2020, see above     Severe Aortic Stenosis  Undergoing workup for eventual outpatient TAVR.  -TAVR on hold related to severe hematoma/anemia.  -5/28/2020 acute hypotension and tachycardia fired lactate, elevated 3.3, see separate Note.  Hemoglobin stable, BP soft but stable.  Cardiology has evaluated patient, concern for impending cardiogenic shock given severe aortic stenosis, plan for cardiac catherization;  verbal report from Cardiology,  [formal result report pending]  cites  EF WNL, gradient across the  aortic valve not as severe as had been supposed with TAVR candidacy.-TAVR follow-up as per cardiology    Leukocytosis, lactic acidosis with hypotension, tachycardia  Bilateral pulmonary infiltrates  CXR new bilateral interstitial infiltrates suggesting infection or edema.  Given concerns of possible early sepsis firing sepsis protocol with lactic at 3.3, patient started on broad-spectrum antibiotics, given penicillin allergy elected ceftriaxone and vancomycin.  Ordered sputum to narrow antibiotics.  Nursing concerned of aspiration, ST to rule out aspiration.  Close monitor.  -Discussed with Cardiology, CXR Radiology interpretation infection vs edema.  Laboratory studies with elevated BNP suggest more so pulmonary volume excess, Cardiology reluctant to diuresis given aortic stenosis.  As Patient has significantly improved on IV antibiotics with downtrending WBC, marked improvement in patient's clinical status/cognition, and with normal procalcitonin leaning to short course of antibiotics, ie 5 days.  MRSA nasal swab negative, DC Vanco, continue cefuroxime as above.  Stop Rocephin on June 4    MEL  - CPAP with home settings    Physical deconditioning  -OT PT.  I contacted Mrs. Ramos and left message with callback number.     Diet: Snacks/Supplements Adult: Boost Plus; Between Meals  Combination Diet Dysphagia Diet Level 1: Pureed; Thin Liquids (water, ice chips, juice, milk gelatin, ice cream, etc) (NO straws and NO chin tuck. )  Room Service    DVT Prophylaxis: Pneumatic Compression Devices  Bravo Catheter: not present  Code Status: Full Code           Disposition Plan   Expected discharge: He will need a transfer to TCU possibly in a day or 2, transfer was not initiated today as the patient was feeling dizzy and his blood pressures were low.    Entered: Mere Jimenez MD 06/02/2020, 1:39 PM      Mere Jimenez MD  Hospitalist Service  [facility]  [facility]    ______________________________________________________________________    Interval History   His heart rate was much controlled today, his metoprolol dose was on hold as his blood pressures were very low, he also had complaints of dizziness, discussed with the nursing about obtaining an orthostatic blood pressure check on him.  His blood pressures later on was much lowered 82/50.  Cardiology is planning to do a small fluid bolus.  X-ray was obtained of his chest which shows pulmonary vascular congestion with improvement in bilateral infiltrates.  Data reviewed today: I reviewed all medications, new labs and imaging results over the last 24 hours.    Physical Exam   Vital Signs: Temp: 98.6  F (37  C) Temp src: Axillary BP: (!) 82/50 Pulse: 93 Heart Rate: 123 Resp: 18 SpO2: 94 % O2 Device: Nasal cannula Oxygen Delivery: 1 LPM  Weight: 158 lbs 3.2 oz      Constitutional: Awake, alert, cooperative, no apparent distress  Respiratory: Clear to auscultation bilaterally, no crackles or wheezing  Cardiovascular: Regular rate and rhythm, normal S1 and S2, and no murmur noted  GI: Normal bowel sounds, soft, non-distended, left groin area noted, ecchymosis extending into the scrotal area and the external genitalia.    Skin/Integumen: No rashes, no cyanosis, no edema  Neuro : moving all 4 extremities, no focal deficit noted         Data   Recent Labs   Lab 06/02/20  0530 06/01/20  0605 05/31/20  0535 05/30/20  1645 05/30/20  0545 05/29/20  1310 05/29/20  0620 05/28/20  1412   WBC  --  12.8* 13.7*  --  12.1*  --  17.9*  --    HGB  --  8.4* 8.7* 8.9* 7.9*  --  9.0* 9.1*   MCV  --  99 99  --  98  --  97  --    PLT  --  438 422  --  375  --  388  --    NA  --  141  --   --  138  --  138  --    POTASSIUM 3.4 3.4 3.6  --  3.6  --  4.1  --    CHLORIDE  --  111*  --   --  109  --  109  --    CO2  --  24  --   --  24  --  24  --    BUN  --  9  --   --  7  --  6*  --    CR 0.90 0.82 0.61*  --  0.69  --  0.67  --    ANIONGAP   --  6  --   --  5  --  5  --    ULISSES  --  7.6*  --   --  7.4*  --  7.7*  --    GLC  --  87  --   --  77  --  87  --    ALBUMIN  --   --   --   --   --  2.1*  --  2.1*   PROTTOTAL  --   --   --   --   --  6.1*  --  6.3*   BILITOTAL  --   --   --   --   --  1.4*  --  1.3   ALKPHOS  --   --   --   --   --  71  --  88   ALT  --   --   --   --   --  15  --  15   AST  --   --   --   --   --  28  --  27     Recent Results (from the past 24 hour(s))   XR Chest Port 1 View    Narrative    XR CHEST PORT 1 VW 6/2/2020 10:15 AM    HISTORY: shortness of breath    COMPARISON: 5/29/2020      Impression    IMPRESSION: Improvement in previously seen bilateral airspace disease.  There is still pulmonary venous congestion and focal infiltrate in the  right costophrenic angle. No pneumothorax. Mild decrease in the  cardiopericardial silhouette size. There are mitral annular  calcifications. Right internal jugular central line with tip at the  expected location of the cavoatrial junction.    SAMEER BHATT MD     Medications     - MEDICATION INSTRUCTIONS -       - MEDICATION INSTRUCTIONS -       - MEDICATION INSTRUCTIONS -       - MEDICATION INSTRUCTIONS -       Percutaneous Coronary Intervention orders placed (this is information for BPA alerting)       ACE/ARB/ARNI NOT PRESCRIBED         sodium chloride 0.9%  250 mL Intravenous Once     aspirin  81 mg Oral Daily     atorvastatin  40 mg Oral Daily     cefTRIAXone  2 g Intravenous Q24H     clopidogrel  75 mg Oral Daily     digoxin  250 mcg Oral Daily     gabapentin  600 mg Oral QPM     heparin lock flush  5-10 mL Intracatheter Q24H     metoprolol tartrate  12.5 mg Oral BID     tamsulosin  0.4 mg Oral Daily

## 2020-06-02 NOTE — PLAN OF CARE
Discharge Planner SLP   Patient plan for discharge: Hopeful for TCU soon  Current status: Pt tolerated soft solids with slow and careful mastication and oral manipulation. Sufficient oral clearance and no s/sx of aspiration with solids or liquids. Education provided re: strategies, pt very agreeable and reported understanding     Recommend dysphagia diet 2 and thin liquids. Pt must be seated fully upright, avoid straws, take small bites/sips, eat/drink at a slow rate, and alternate solids and liquids. Pt to remain upright for 30-60 minutes after eating     Barriers to return to prior living situation: Below baseline  Recommendations for discharge: TCU  Rationale for recommendations: SLP at next level of care for management of dysphagia         Entered by: Beba Novak 06/02/2020 3:48 PM

## 2020-06-02 NOTE — PROGRESS NOTES
[facility]    Cardiology Progress Note    Date of Admission: 05/18/2020  Service date: 06/02/2020    Summary:  Mr. Efrem Ramos is a very pleasant 74 year old male with a history of MEL, PVD, HLD, CVA 2015, atrial fibrillation, severe aortic stenosis and mild to moderate mitral valve stenosis admitted on 5/18/2020. He underwent and elective cardiac catheterization and RCA intervention as part of an outpatient TAVR workup. Post-procedurally he unfortunately developed persistent bleeding from his left femoral access site and subsequent hemorrhagic shock.     Assessment & Plan   1. Postprocedural shock, hemorrhagic  - Complicated by severe aortic stenosis. Ongoing hypotension requiring NorEpi.   - Now weaned off pressors on 5/21/20 after receiving 1 unit PRBC. BP remain soft but stable.   2. Severe aortic stenosis   - Awaiting TAVR as an outpatient after recovery from recent PCI and hemorrhagic shock.  3. Severe single-vessel right coronary artery disease   - Status post complex PCI with rotational atherectomy and 4 stents to the proximal to distal RCA on 5/18/20.   - Complicated by RFA bleeding into the groin, penis, and scrotum without retroperitoneal bleeding or pseudoaneurysm.   4. Chronic atrial fibrillation   - Anticoagulated on Eliquis PTA with history of stroke. Off Eliquis currently due to bleeding.   - On metoprolol tartrate 12.5 mg BID and digoxin 250 mcg bre. BP has been too low to allow titration of beta blocker.  - Currently rate controlled at rest, but with RVR and dizziness with activity.  5. Peripheral vascular disease  6. History of CVA in 2015   - No significant residual neurological deficit. Okay to be off Eliquis until confirmation of no re-bleeding.  7. Acute blood loss anemia  - Hgb stabilized now at 8.4 today. Status post transfusions with 4 units of PRBCs this admission. Transfuse for hgb <7.    Plan:   1. Continue with aspirin and plavix.  2. Continue holding  Eliquis for now. Plan to restart Eliquis and stop aspirin once bleeding risk has subsided and anemia improved. Likely in about 2 weeks and then can consider stopping ASA and restarting Apixaban with Plavix.   3. Continue digoxin 250 mcg daily and metoprolol tartrate 12.5 mg BID for rate control. Would consider a formal EP consult for consideration of rhythm control strategy given difficult to control rates without BP room for titration of beta blocker.   4. Wean off oxygen as able.  5. TAVR not yet scheduled. He should recover well from this episode prior to TAVR if possible.   6. Will arrange for follow up with a Cardiology [facility] in 1-2 weeks post discharge with a digoxin level beforehand.     Disposition Plan   Expected discharge in 1-2 days to transitional care unit pending improvement in dizziness, stable blood pressures, and improvement in rate control with A.Fib.     Entered: Pedro Grimes 06/02/2020, 11:25 AM     Interval History   Patient is resting comfortably. He continues to feel winded easily with minimal activity. His BPs have been soft intermittently and he had some dizziness getting up to the bathroom this morning.  He otherwise denies chest pain, palpitations, or dizziness. He has intermittently been oxygen, currently on 1.0-1.5 L by nasal cannula. His rates with A.Fib have been fairly rapid with any activity but stable at rest around  bpm.    Telemetry: A.Fib with HRs around  bpm at rest, up to 120-160 bpm with activity.    Thank you for the opportunity to participate in this pleasant patient's care.     Seth Grimes, APRN, CNP   Text Page  (8am - 5pm, M-F)    Patient Active Problem List   Diagnosis     Encephalopathy     Mixed hyperlipidemia     Other specified disorder of skin     MEL (obstructive sleep apnea)     PVD (peripheral vascular disease) (H)     Decreased diffusion capacity     Anemia     Vocal cord cancer (H)     Lower extremity edema     Lymphocytic colitis     Mitral valve  problem     IgG monoclonal gammopathy     CARDIOVASCULAR SCREENING; LDL GOAL LESS THAN 100     Vitamin B12 deficiency disease     Elevated ferritin     Collagenous colitis     Redundant colon     Rash and nonspecific skin eruption     Cerebral infarction (H)     Alcohol abuse     Atrial fibrillation and flutter (H)     Health Care Home     Atrial fibrillation (H)     Hypoxia     Acute respiratory failure with hypoxia (H)     Physical deconditioning     Urinary retention     Community acquired pneumonia     Constipation     Pneumonia     CAD (coronary artery disease)-moderate  nonobstructive 2 vessel     Hypokalemia     Non-traumatic compression fracture of L1 lumbar vertebra with routine healing, subsequent encounter     Age-related osteoporosis with current pathological fracture with routine healing, subsequent encounter     Benign essential hypertension     Coronary artery disease involving native coronary artery of native heart with angina pectoris (H)     Other secondary pulmonary hypertension (H)     Edema due to malnutrition, due to unspecified malnutrition type (H)     Abnormal findings on diagnostic imaging of heart and coronary circulation     Coronary artery disease involving native coronary artery of native heart without angina pectoris     Status post coronary angiogram     Bleeding     Nonrheumatic aortic valve stenosis     Cardiogenic shock (H)       Physical Exam   Temp: 98  F (36.7  C) Temp src: Axillary BP: 93/61 Pulse: 103 Heart Rate: 123 Resp: 18 SpO2: 94 % O2 Device: Nasal cannula Oxygen Delivery: 1 LPM  Vitals:    05/31/20 0542 06/01/20 0645 06/02/20 0516   Weight: 76.3 kg (168 lb 4.8 oz) 75.5 kg (166 lb 8 oz) 71.8 kg (158 lb 3.2 oz)     Vital Signs with Ranges  Temp:  [97.5  F (36.4  C)-98.3  F (36.8  C)] 98  F (36.7  C)  Pulse:  [] 103  Heart Rate:  [106-156] 123  Resp:  [18] 18  BP: ()/(56-85) 93/61  SpO2:  [90 %-97 %] 94 %  I/O last 3 completed shifts:  In: 620 [P.O.:620]  Out:  920 [Urine:920]    Constitutional: Pleasant, frail appearing elderly gentleman in no acute distress.  Eyes: Pupils equal, round. Sclerae anicteric.   HEENT: Normocephalic, atraumatic.   Respiratory: Breathing non-labored. Lungs dimished bilaterally. No crackles, wheezes, rhonchi, or rales.  Cardiovascular: Irregularly irregular rhythm, fast rate, grade 2/6 systolic murmur best heard at the LUSB, no rub or gallop. Femoral pulses 2+ bilaterally.  Skin: Warm, dry. Significant healing ecchymosis throughout the groin to both femorals and down to the scrotum.   Musculoskeletal/Extremities: Moves all extremities well and symmetrically. No lower extremity edema bilaterally.  Neurologic: No gross focal deficits. Alert, awake, and oriented to person, place and time.  Psychiatric: Affect appropriate. Mentation normal.    Medications     - MEDICATION INSTRUCTIONS -       - MEDICATION INSTRUCTIONS -       - MEDICATION INSTRUCTIONS -       - MEDICATION INSTRUCTIONS -       Percutaneous Coronary Intervention orders placed (this is information for BPA alerting)       ACE/ARB/ARNI NOT PRESCRIBED         aspirin  81 mg Oral Daily     atorvastatin  40 mg Oral Daily     cefTRIAXone  2 g Intravenous Q24H     clopidogrel  75 mg Oral Daily     digoxin  250 mcg Oral Daily     gabapentin  600 mg Oral QPM     heparin lock flush  5-10 mL Intracatheter Q24H     metoprolol tartrate  12.5 mg Oral BID     tamsulosin  0.4 mg Oral Daily     Data   Results for orders placed or performed during the hospital encounter of 05/18/20 (from the past 24 hour(s))   Lactic acid level STAT   Result Value Ref Range    Lactate for Sepsis Protocol 1.1 0.7 - 2.0 mmol/L   Creatinine   Result Value Ref Range    Creatinine 0.90 0.66 - 1.25 mg/dL    GFR Estimate 82 >60 mL/min/[1.73_m2]    GFR Estimate If Black >90 >60 mL/min/[1.73_m2]   Potassium   Result Value Ref Range    Potassium 3.4 3.4 - 5.3 mmol/L   XR Chest Port 1 View    Narrative    XR CHEST PORT 1 VW  6/2/2020 10:15 AM    HISTORY: shortness of breath    COMPARISON: 5/29/2020      Impression    IMPRESSION: Improvement in previously seen bilateral airspace disease.  There is still pulmonary venous congestion and focal infiltrate in the  right costophrenic angle. No pneumothorax. Mild decrease in the  cardiopericardial silhouette size. There are mitral annular  calcifications. Right internal jugular central line with tip at the  expected location of the cavoatrial junction.    SAMEER BHATT MD     This note was completed in part using Dragon voice recognition software. Although reviewed after completion, some word and grammatical errors may occur.

## 2020-06-03 ENCOUNTER — APPOINTMENT (OUTPATIENT)
Dept: SPEECH THERAPY | Facility: CLINIC | Age: 75
DRG: 246 | End: 2020-06-03
Payer: MEDICARE

## 2020-06-03 ENCOUNTER — APPOINTMENT (OUTPATIENT)
Dept: OCCUPATIONAL THERAPY | Facility: CLINIC | Age: 75
DRG: 246 | End: 2020-06-03
Payer: MEDICARE

## 2020-06-03 LAB
ANION GAP SERPL CALCULATED.3IONS-SCNC: 6 MMOL/L (ref 3–14)
BACTERIA SPEC CULT: NO GROWTH
BACTERIA SPEC CULT: NO GROWTH
BUN SERPL-MCNC: 10 MG/DL (ref 7–30)
CALCIUM SERPL-MCNC: 7.9 MG/DL (ref 8.5–10.1)
CHLORIDE SERPL-SCNC: 108 MMOL/L (ref 94–109)
CO2 SERPL-SCNC: 25 MMOL/L (ref 20–32)
CREAT SERPL-MCNC: 0.9 MG/DL (ref 0.66–1.25)
ERYTHROCYTE [DISTWIDTH] IN BLOOD BY AUTOMATED COUNT: 17.6 % (ref 10–15)
GFR SERPL CREATININE-BSD FRML MDRD: 82 ML/MIN/{1.73_M2}
GLUCOSE SERPL-MCNC: 74 MG/DL (ref 70–99)
HCT VFR BLD AUTO: 27.7 % (ref 40–53)
HGB BLD-MCNC: 9 G/DL (ref 13.3–17.7)
LACTATE BLD-SCNC: 0.8 MMOL/L (ref 0.7–2)
MCH RBC QN AUTO: 32.5 PG (ref 26.5–33)
MCHC RBC AUTO-ENTMCNC: 32.5 G/DL (ref 31.5–36.5)
MCV RBC AUTO: 100 FL (ref 78–100)
PLATELET # BLD AUTO: 487 10E9/L (ref 150–450)
POTASSIUM SERPL-SCNC: 3.4 MMOL/L (ref 3.4–5.3)
RBC # BLD AUTO: 2.77 10E12/L (ref 4.4–5.9)
SODIUM SERPL-SCNC: 139 MMOL/L (ref 133–144)
SPECIMEN SOURCE: NORMAL
SPECIMEN SOURCE: NORMAL
WBC # BLD AUTO: 13.5 10E9/L (ref 4–11)

## 2020-06-03 PROCEDURE — 21000001 ZZH R&B HEART CARE

## 2020-06-03 PROCEDURE — 83605 ASSAY OF LACTIC ACID: CPT | Performed by: INTERNAL MEDICINE

## 2020-06-03 PROCEDURE — 99233 SBSQ HOSP IP/OBS HIGH 50: CPT | Performed by: INTERNAL MEDICINE

## 2020-06-03 PROCEDURE — 25000132 ZZH RX MED GY IP 250 OP 250 PS 637: Mod: GY | Performed by: HOSPITALIST

## 2020-06-03 PROCEDURE — 99232 SBSQ HOSP IP/OBS MODERATE 35: CPT | Performed by: INTERNAL MEDICINE

## 2020-06-03 PROCEDURE — 25000128 H RX IP 250 OP 636: Performed by: HOSPITALIST

## 2020-06-03 PROCEDURE — 80048 BASIC METABOLIC PNL TOTAL CA: CPT | Performed by: INTERNAL MEDICINE

## 2020-06-03 PROCEDURE — 92526 ORAL FUNCTION THERAPY: CPT | Mod: GN

## 2020-06-03 PROCEDURE — 25000132 ZZH RX MED GY IP 250 OP 250 PS 637: Mod: GY | Performed by: INTERNAL MEDICINE

## 2020-06-03 PROCEDURE — 25800030 ZZH RX IP 258 OP 636: Performed by: INTERNAL MEDICINE

## 2020-06-03 PROCEDURE — 25000132 ZZH RX MED GY IP 250 OP 250 PS 637: Mod: GY | Performed by: PHYSICIAN ASSISTANT

## 2020-06-03 PROCEDURE — 25000128 H RX IP 250 OP 636: Performed by: INTERNAL MEDICINE

## 2020-06-03 PROCEDURE — 97535 SELF CARE MNGMENT TRAINING: CPT | Mod: GO | Performed by: OCCUPATIONAL THERAPIST

## 2020-06-03 PROCEDURE — 85027 COMPLETE CBC AUTOMATED: CPT | Performed by: INTERNAL MEDICINE

## 2020-06-03 RX ADMIN — SODIUM CHLORIDE, PRESERVATIVE FREE 5 ML: 5 INJECTION INTRAVENOUS at 05:37

## 2020-06-03 RX ADMIN — CLOPIDOGREL BISULFATE 75 MG: 75 TABLET, FILM COATED ORAL at 08:59

## 2020-06-03 RX ADMIN — ASPIRIN 81 MG: 81 TABLET, DELAYED RELEASE ORAL at 09:00

## 2020-06-03 RX ADMIN — METOPROLOL TARTRATE 12.5 MG: 25 TABLET, FILM COATED ORAL at 22:39

## 2020-06-03 RX ADMIN — SODIUM CHLORIDE 500 ML: 9 INJECTION, SOLUTION INTRAVENOUS at 14:42

## 2020-06-03 RX ADMIN — CEFTRIAXONE 2 G: 2 INJECTION, POWDER, FOR SOLUTION INTRAMUSCULAR; INTRAVENOUS at 09:10

## 2020-06-03 RX ADMIN — POTASSIUM CHLORIDE 20 MEQ: 1500 TABLET, EXTENDED RELEASE ORAL at 17:00

## 2020-06-03 RX ADMIN — Medication 5 ML: at 22:48

## 2020-06-03 RX ADMIN — DIGOXIN 250 MCG: 125 TABLET ORAL at 09:00

## 2020-06-03 RX ADMIN — GABAPENTIN 600 MG: 600 TABLET, FILM COATED ORAL at 22:39

## 2020-06-03 RX ADMIN — ATORVASTATIN CALCIUM 40 MG: 40 TABLET, FILM COATED ORAL at 08:59

## 2020-06-03 RX ADMIN — METOPROLOL TARTRATE 12.5 MG: 25 TABLET, FILM COATED ORAL at 11:06

## 2020-06-03 RX ADMIN — OXYCODONE HYDROCHLORIDE 2.5 MG: 5 TABLET ORAL at 22:39

## 2020-06-03 ASSESSMENT — MIFFLIN-ST. JEOR: SCORE: 1407.58

## 2020-06-03 NOTE — PROGRESS NOTES
[facility]    Medicine Progress Note - Hospitalist Service       Date of Admission:  5/18/2020  Assessment & Plan   Efrem Ramos is a 74 year old male with a past medical history of obstructive sleep apnea, hyperlipidemia, CVA, A. fib on chronic anticoagulation with Eliquis, coronary artery disease, mitral stenosis, and severe aortic stenosis who was admitted on 5/18/2020 following complications after an elective heart catheterization with stent placement and rotational artherectomy of RCA in anticipation of TAVR.  Postprocedure, patient was identified to have persistent cath site bleeding with development of massive hematoma and associated hemorrhagic shock.  Patient was identified to have active extravasation from the left common femoral artery. Hemostasis was achieved with ultrasound-guided direct pressure. He was resuscitated and admitted to the ICU with ongoing cares.  Once off of pressors patient transferred out of ICU/hospitalist contacted for resumption of care.    Postprocedure massive hematoma, left groin/scrotum  Hemorrhagic shock, resolved  Acute blood loss anemia  Postprocedure, developed acute hypotension with rapid development of groin/scrotal hematoma. In setting of chronic AC with Eliquis for afib, had been held for 3d in anticipation of procedure. Required initiation of massive transfusion protocol, pressor support.  CTA A/P revealed active extravasation from left common femoral artery into left groin with extensive hemorrhage. Vascular surgery was urgently consulted, femstop repositioned and hemostasis achieved with direct ultrasound-guided pressure.   - Pt received a total of 4u PRBCs, last transfusion 5/21 for Hgb 7.8 and persistent hypotension.  Posttransfusion hemoglobin was 9.7  - fluctuating between ~8-9 since, 6/3 hgb at 9.0  - Continue to monitor daily Hgb, transfuse PRN Hgb < 7  - On DAPT with ASA/Plavix given recent PCI. Previously on Eliquis for afib, currently on  hold as per cardiology with plans to restart in near future   - Urology consulted given scrotal hematoma, recommended scrotal support & elevation.  Urology reconsulted given significant PVR's, started on Flomax. Recommended follow up visit in 4 weeks or earlier if needed  - given IV iron x 2 2/2 low iron  - ongoing pain management  - US no recurrent pseudoaneurysm 5/29, noted residual hematoma left groin.    Chronic Afib with RVR  Hx afib on chronic AC with Eliquis. PTA maintained on metoprolol for rate control.   Has episodic RVR with activity.  Heart rate bumps up up to 140-150 with minimal activity secondary to severe aortic stenosis.  - PTA metoprolol continues at lower dose 2/2 hypotension at 12.5 mg am/ 25 mg pm  - digoxin added, currently on 250 mcg daily  - he had an emergent cardiac catheterization 5/28/2020 secondary to acute hypotension, tachycardia, lactate firing.  Final report pending (as of 6/3 not completed), verbal report from Cardiology,   cites  EF WNL, gradient across the aortic valve not as severe as had been supposed with TAVR candidacy  - Cardiology planning to hold off on Eliquis for now, restart following outpatient follow-up in 2 weeks/ when safe given bleed  - rates still 110-120's, BP's 80-90's systolic. Getting 500 ml fluids now, will follow    Hypoxia  Not on O2 prior to admission. CXR with pulmonary vascular congestion.   - titrate O2 off as able  - abx as below for pneumonia    CAD s/p PCIx4 to RCA (5/18/2020)  Hypertension  Hyperlipidemia  Pt with identified RCA disease on initial angiogram for TAVR workup performed 3/23/20, however due to uncertainty on repair approach (surgical v percutaneous) of aortic valve, RCA was not intervened upon at that time. CT TAVR was subsequently performed and indicated favorable anatomy for TAVR. Subsequently underwent angiogram 5/18/2020 with complex PCI, adjunctive rotational arthrectomy with DESx4 from proximal to distal RCA.  - DAPT with  "ASA/ plavix for now given bleed  - Restarted low-dose metoprolol 5/24/2020, see above     Severe Aortic Stenosis  Undergoing workup for eventual outpatient TAVR. TAVR currently on hold related give bleed  -TAVR follow-up as per cardiology    Sepsis (Leukocytosis, lactic acidosis with hypotension, tachycardia)  Bilateral pulmonary infiltrates concerning for pna vs CHF  CXR 5/29 with new bilateral interstitial infiltrates suggesting infection or edema. With lactate at 3.3 was started on vanco/ceftx (pcn allergy).    - SLP following and appreciate assistance  - ? CHF vs pna. Very difficult to diurese given severe aortic stenosis and hypotension. Improved on IV abx. vanco discontinued. Completes course of rocephin 6/4  - 5/28 blood cultures no growth    MEL  CPAP with home settings    Physical deconditioning  -OT PT.     Diet: Snacks/Supplements Adult: Boost Plus; Between Meals  Room Service  Combination Diet Dysphagia Diet Level 2: Mechan Altered; Thin Liquids (water, ice chips, juice, milk gelatin, ice cream, etc) (NO straws and NO chin tuck. )    DVT Prophylaxis: Pneumatic Compression Devices  Bravo Catheter: not present  Code Status: Full Code           Disposition Plan   Expected discharge: possibly in next day or two, however unless demonstrates stability worry that if discharged he would come right back    Entered: Ramesh Marie MD 06/03/2020, 12:18 PM     I spent >35 minutes on the unit/floor in management of care today. Over 50% of my time was spent counseling the patient and/or coordinating care regarding services listed in this note.    Ramesh Marie M.D.  Hospitalist  Pager [phone]  Text Page    ______________________________________________________________________    Interval History   Overnight events reviewed. C/o being \"sore\" in groin area. C/o SOB but not worse. No noted CP. Still with poor appetite    Data reviewed today: I reviewed all medications, new labs and imaging results over the " last 24 hours.    Physical Exam   Vital Signs: Temp: 98.2  F (36.8  C) Temp src: Oral BP: 90/57 Pulse: 110 Heart Rate: 121 Resp: 18 SpO2: 98 % O2 Device: Nasal cannula Oxygen Delivery: 1/2 LPM  Weight: 158 lbs 8 oz      Constitutional: Awake, alert, cooperative, no apparent distress  Respiratory: CTA B  Cardiovascular: irregular rhythm, rate ok. PRAFUL noted. No edema  GI: Normal bowel sounds, soft, non-distended  Skin/Integumen: No rashes, no cyanosis. Bruising/ ecchymoses diffusely in LLQ and groin area. Scrotum very swollen. Skin looks ok in area  Neuro : moving all 4 extremities, no focal deficit noted         Data   Recent Labs   Lab 06/03/20  0530 06/02/20  0530 06/01/20  0605 05/31/20  0535  05/30/20  0545 05/29/20  1310  05/28/20  1412   WBC 13.5*  --  12.8* 13.7*  --  12.1*  --    < >  --    HGB 9.0*  --  8.4* 8.7*   < > 7.9*  --    < > 9.1*     --  99 99  --  98  --    < >  --    *  --  438 422  --  375  --    < >  --      --  141  --   --  138  --    < >  --    POTASSIUM 3.4 3.4 3.4 3.6  --  3.6  --    < >  --    CHLORIDE 108  --  111*  --   --  109  --    < >  --    CO2 25  --  24  --   --  24  --    < >  --    BUN 10  --  9  --   --  7  --    < >  --    CR 0.90 0.90 0.82 0.61*  --  0.69  --    < >  --    ANIONGAP 6  --  6  --   --  5  --    < >  --    ULISSES 7.9*  --  7.6*  --   --  7.4*  --    < >  --    GLC 74  --  87  --   --  77  --    < >  --    ALBUMIN  --   --   --   --   --   --  2.1*  --  2.1*   PROTTOTAL  --   --   --   --   --   --  6.1*  --  6.3*   BILITOTAL  --   --   --   --   --   --  1.4*  --  1.3   ALKPHOS  --   --   --   --   --   --  71  --  88   ALT  --   --   --   --   --   --  15  --  15   AST  --   --   --   --   --   --  28  --  27    < > = values in this interval not displayed.     No results found for this or any previous visit (from the past 24 hour(s)).  Medications     - MEDICATION INSTRUCTIONS -       - MEDICATION INSTRUCTIONS -       - MEDICATION INSTRUCTIONS  -       - MEDICATION INSTRUCTIONS -       Percutaneous Coronary Intervention orders placed (this is information for BPA alerting)       ACE/ARB/ARNI NOT PRESCRIBED         aspirin  81 mg Oral Daily     atorvastatin  40 mg Oral Daily     cefTRIAXone  2 g Intravenous Q24H     clopidogrel  75 mg Oral Daily     digoxin  250 mcg Oral Daily     gabapentin  600 mg Oral QPM     heparin lock flush  5-10 mL Intracatheter Q24H     metoprolol tartrate  12.5 mg Oral BID     tamsulosin  0.4 mg Oral Daily

## 2020-06-03 NOTE — PLAN OF CARE
7am-3pm nsg note    Ortho bp positive, 250 ml bolus given per orders. Denies any pain or discomfort. Afib w  CVR, Up with 2 assist and walker, gait belt.

## 2020-06-03 NOTE — PLAN OF CARE
Pt stable over night, VSS, no c/o Chest pain or sob.  Pt in Afib CVR without ectopy.  Pt slept well, no new issues, will continue to monitor.

## 2020-06-03 NOTE — PLAN OF CARE
Discharge Planner SLP   Patient plan for discharge: did not state  Current status: Swallow tx completed. Pt seen with thin liquids, DD2 solids - RN and pt deny concerns since upgrade prior day. Pt very cautious and slow with PO, taking small single bites/sips. Cues for double swallows and liquid wash for pharyngeal residuals. No signs/sx aspiration noted.     Recommend continue dysphagia diet 2 and thin liquids. Pt must be seated fully upright, avoid straws, take small bites/sips, eat/drink at a slow rate, and alternate solids and liquids. Pt to remain upright for 30-60 minutes after eating. Medications: crush with puree currently, RN may trial small single pills with puree, as appropriate.      Barriers to return to prior living situation: Below baseline  Recommendations for discharge: TCU  Rationale for recommendations: SLP at next level of care for management of dysphagia       Entered by: Alisha Vitale 06/03/2020 3:09 PM

## 2020-06-03 NOTE — PLAN OF CARE
Discharge Planner OT   Patient plan for discharge: Agreeable to TCU but would rather go home  Current status: Pt washing up and brushing teeth at EOB when OT arrived. Transferred sit to stand with CGA from bed. Ambulated approx 25' total with CGA, WW, assist to manage IV and O2 cord. O2 98% on 0.5 LPM via NC. HR up to 140 with activity. Completed toilet task with CGA for transfer, Min A for hygiene after BM and Mod A for managing briefs. Pt wanted to rest in bed at end of session. Mildly lightheaded, getting a fluid bolus. Updated RN.  Barriers to return to prior living situation: Current level of assist, falls risk, impaired activity tolerance  Recommendations for discharge: TCU  Rationale for recommendations: Pt would benefit from continued OT and PT at TCU setting to maximize (I), safety and activity tolerance for eventual discharge home.       Entered by: Hannah Bernard 06/03/2020 12:44 PM

## 2020-06-03 NOTE — PLAN OF CARE
Neuro A&Ox4  .vital   Tele/Cardiac A-Fib CVR/ RVR with activity  Respiratory 1L NC  Activity assist of 2 and RW and GB  Pain Tylenol given at HS  Drips SL  Drains/Tubes three lumen internal jugular- SL  Skin bruised, L and R groin and scrotal and vivek area  CI/ voiding well and had BM 6/2  Aggression color green  Plan TBD  Miscellaneous: poor appetite, encourage fluids

## 2020-06-03 NOTE — PLAN OF CARE
Pt AOR X 4, he continues to be weak and deconditioned, needs assist of 2 to transfer out of bed. Denies any pain or discomfort.   Bp soft this am, 500 CC bolus given per cards. Will cont to monitor. Remains in AFIB w CVR, on Metoprolol and Digoxin.

## 2020-06-03 NOTE — PROGRESS NOTES
[facility]    Cardiology Progress Note    Date of Admission: 05/18/2020  Service date: 06/03/2020    Summary:  Mr. Efrem Ramos is a very pleasant 74 year old male with a history of MEL, PVD, HLD, CVA 2015, atrial fibrillation, severe aortic stenosis and mild to moderate mitral valve stenosis admitted on 5/18/2020. He underwent and elective cardiac catheterization and RCA intervention as part of an outpatient TAVR workup. Post-procedurally he unfortunately developed persistent bleeding from his left femoral access site and subsequent hemorrhagic shock.     Assessment & Plan   1. Postprocedural shock, hemorrhagic  - Complicated by severe aortic stenosis. Ongoing hypotension requiring NorEpi.   - Now weaned off pressors on 5/21/20 after receiving 1 unit PRBC. BP remain soft but stable.   2. Severe aortic stenosis   - Awaiting TAVR as an outpatient after recovery from recent PCI and hemorrhagic shock.  3. Severe single-vessel right coronary artery disease   - Status post complex PCI with rotational atherectomy and 4 stents to the proximal to distal RCA on 5/18/20.   - Complicated by RFA bleeding into the groin, penis, and scrotum without retroperitoneal bleeding or pseudoaneurysm.   4. Chronic atrial fibrillation   - Anticoagulated on Eliquis PTA with history of stroke. Off Eliquis currently due to bleeding.   - PTA on metoprolol succinate 12.5 mg in the morning and 25 mg in the evening. Currently on metoprolol tartrate 12.5 mg BID and digoxin 250 mcg daily. BP has been too low to allow titration of beta blocker and metoprolol doses have .  - Currently rate controlled at rest, but with RVR and dizziness with activity.  5. Peripheral vascular disease  6. History of CVA in 2015   - No significant residual neurological deficit. Okay to be off Eliquis until confirmation of no re-bleeding.  7. Acute blood loss anemia  - Hgb stabilized now at 9.0 today. Status post transfusions with 4 units of  PRBCs this admission. Transfuse for hgb <7.  8. Physical deconditioning   - Patient remains quite weak and with a poor appetite. Continue with PT/OT and try to increase activity. Plan for TCU at discharge for continued rehab, PT/OT.     Plan:   1. Continue with aspirin and plavix.  2. Continue holding Eliquis for now. Plan to restart Eliquis and stop aspirin once bleeding risk has subsided and anemia improved. Likely in about 2 weeks and then can consider stopping ASA and restarting Apixaban with Plavix.   3. Continue digoxin 250 mcg daily and metoprolol tartrate 12.5 mg BID for rate control. 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 There is a reasonable chance that he may need PPM implant should he undergo TAVR regardless.   4. Wean off oxygen as able.  5. TAVR not yet scheduled. He should recover well from this episode prior to TAVR if possible.   6. Will arrange for follow up with a Cardiology [facility] in 1-2 weeks post discharge with a digoxin level beforehand.     Disposition Plan   Expected discharge in 1-2 days to transitional care unit pending improvement in dizziness, hypotension, and rate control with A.Fib.     Entered: Pedro Sydney 06/03/2020, 10:31 AM     Interval History   Patient is resting comfortably. He continues to feel winded and dizzy easily with minimal activity. His appetite has been poor and he continues to feel weak. His BPs have been soft intermittently. He did have positive orthostatic blood pressures yesterday without significant change noted following a 250 mL fluid bolus. He otherwise denies chest pain, palpitations, or dizziness. He has intermittently been oxygen, currently on 0.5 L by nasal cannula. His rates with A.Fib have been fairly rapid with minimal activity but stable at rest around   bpm.    Telemetry: Bret     Thank you for the opportunity to participate in this pleasant patient's care.     Seht Grimes, APRN, CNP   Text Page  (8am - 5pm, M-F)    Patient Active Problem List   Diagnosis     Encephalopathy     Mixed hyperlipidemia     Other specified disorder of skin     MEL (obstructive sleep apnea)     PVD (peripheral vascular disease) (H)     Decreased diffusion capacity     Anemia     Vocal cord cancer (H)     Lower extremity edema     Lymphocytic colitis     Mitral valve problem     IgG monoclonal gammopathy     CARDIOVASCULAR SCREENING; LDL GOAL LESS THAN 100     Vitamin B12 deficiency disease     Elevated ferritin     Collagenous colitis     Redundant colon     Rash and nonspecific skin eruption     Cerebral infarction (H)     Alcohol abuse     Atrial fibrillation and flutter (H)     Health Care Home     Atrial fibrillation (H)     Hypoxia     Acute respiratory failure with hypoxia (H)     Physical deconditioning     Urinary retention     Community acquired pneumonia     Constipation     Pneumonia     CAD (coronary artery disease)-moderate  nonobstructive 2 vessel     Hypokalemia     Non-traumatic compression fracture of L1 lumbar vertebra with routine healing, subsequent encounter     Age-related osteoporosis with current pathological fracture with routine healing, subsequent encounter     Benign essential hypertension     Coronary artery disease involving native coronary artery of native heart with angina pectoris (H)     Other secondary pulmonary hypertension (H)     Edema due to malnutrition, due to unspecified malnutrition type (H)     Abnormal findings on diagnostic imaging of heart and coronary circulation     Coronary artery disease involving native coronary artery of native heart without angina pectoris     Status post coronary angiogram     Bleeding     Nonrheumatic aortic valve stenosis     Cardiogenic shock (H)       Physical Exam   Temp: 98.2  F (36.8  C) Temp src: Oral  BP: (!) 86/61 Pulse: 112 Heart Rate: 95 Resp: 18 SpO2: 97 % O2 Device: Nasal cannula Oxygen Delivery: 1 LPM  Vitals:    06/01/20 0645 06/02/20 0516 06/03/20 0532   Weight: 75.5 kg (166 lb 8 oz) 71.8 kg (158 lb 3.2 oz) 71.9 kg (158 lb 8 oz)     Vital Signs with Ranges  Temp:  [97.4  F (36.3  C)-99  F (37.2  C)] 98.2  F (36.8  C)  Pulse:  [] 112  Heart Rate:  [95] 95  Resp:  [10-20] 18  BP: ()/(50-72) 86/61  SpO2:  [94 %-99 %] 97 %  I/O last 3 completed shifts:  In: 240 [P.O.:240]  Out: 1475 [Urine:1475]    Constitutional: Pleasant, frail appearing elderly gentleman in no acute distress.  Eyes: Pupils equal, round. Sclerae anicteric.   HEENT: Normocephalic, atraumatic.   Respiratory: Breathing non-labored. Lungs clear bilaterally. No crackles, wheezes, rhonchi, or rales.  Cardiovascular: Irregularly irregular rhythm, fast rate, grade 2/6 systolic murmur best heard at the LUSB, no rub or gallop.   Skin: Warm, dry. Significant healing ecchymosis throughout the groin to both femorals and down to the scrotum.   Musculoskeletal/Extremities: Moves all extremities well and symmetrically. No lower extremity edema bilaterally.  Neurologic: No gross focal deficits. Alert, awake, and oriented to person, place and time.  Psychiatric: Affect appropriate. Mentation normal.    Medications     - MEDICATION INSTRUCTIONS -       - MEDICATION INSTRUCTIONS -       - MEDICATION INSTRUCTIONS -       - MEDICATION INSTRUCTIONS -       Percutaneous Coronary Intervention orders placed (this is information for BPA alerting)       ACE/ARB/ARNI NOT PRESCRIBED         aspirin  81 mg Oral Daily     atorvastatin  40 mg Oral Daily     cefTRIAXone  2 g Intravenous Q24H     clopidogrel  75 mg Oral Daily     digoxin  250 mcg Oral Daily     gabapentin  600 mg Oral QPM     heparin lock flush  5-10 mL Intracatheter Q24H     metoprolol tartrate  12.5 mg Oral BID     tamsulosin  0.4 mg Oral Daily     Data   Results for orders placed or performed  during the hospital encounter of 05/18/20 (from the past 24 hour(s))   Lactic acid level STAT   Result Value Ref Range    Lactate for Sepsis Protocol 0.7 0.7 - 2.0 mmol/L   Basic metabolic panel   Result Value Ref Range    Sodium 139 133 - 144 mmol/L    Potassium 3.4 3.4 - 5.3 mmol/L    Chloride 108 94 - 109 mmol/L    Carbon Dioxide 25 20 - 32 mmol/L    Anion Gap 6 3 - 14 mmol/L    Glucose 74 70 - 99 mg/dL    Urea Nitrogen 10 7 - 30 mg/dL    Creatinine 0.90 0.66 - 1.25 mg/dL    GFR Estimate 82 >60 mL/min/[1.73_m2]    GFR Estimate If Black >90 >60 mL/min/[1.73_m2]    Calcium 7.9 (L) 8.5 - 10.1 mg/dL   CBC with platelets   Result Value Ref Range    WBC 13.5 (H) 4.0 - 11.0 10e9/L    RBC Count 2.77 (L) 4.4 - 5.9 10e12/L    Hemoglobin 9.0 (L) 13.3 - 17.7 g/dL    Hematocrit 27.7 (L) 40.0 - 53.0 %     78 - 100 fl    MCH 32.5 26.5 - 33.0 pg    MCHC 32.5 31.5 - 36.5 g/dL    RDW 17.6 (H) 10.0 - 15.0 %    Platelet Count 487 (H) 150 - 450 10e9/L     This note was completed in part using Dragon voice recognition software. Although reviewed after completion, some word and grammatical errors may occur.

## 2020-06-03 NOTE — PLAN OF CARE
Alert and oriented x 4, VS low but stable and no complaints or pressure. He was turned and repositioned every two hours and has skin intact. Tele a fib with rates in the 80's. His Potassium was replaced this afternoon. Plan for discharge at some point to TCU.

## 2020-06-04 ENCOUNTER — APPOINTMENT (OUTPATIENT)
Dept: OCCUPATIONAL THERAPY | Facility: CLINIC | Age: 75
DRG: 246 | End: 2020-06-04
Payer: MEDICARE

## 2020-06-04 VITALS
BODY MASS INDEX: 26.4 KG/M2 | RESPIRATION RATE: 16 BRPM | HEART RATE: 128 BPM | OXYGEN SATURATION: 92 % | WEIGHT: 168.2 LBS | HEIGHT: 67 IN | SYSTOLIC BLOOD PRESSURE: 90 MMHG | TEMPERATURE: 99 F | DIASTOLIC BLOOD PRESSURE: 61 MMHG

## 2020-06-04 DIAGNOSIS — R58 HEMORRHAGE: ICD-10-CM

## 2020-06-04 DIAGNOSIS — I48.19 PERSISTENT ATRIAL FIBRILLATION (H): ICD-10-CM

## 2020-06-04 DIAGNOSIS — I25.10 CORONARY ARTERY DISEASE INVOLVING NATIVE CORONARY ARTERY OF NATIVE HEART WITHOUT ANGINA PECTORIS: Primary | ICD-10-CM

## 2020-06-04 LAB
ANION GAP SERPL CALCULATED.3IONS-SCNC: 5 MMOL/L (ref 3–14)
BASOPHILS # BLD AUTO: 0.2 10E9/L (ref 0–0.2)
BASOPHILS NFR BLD AUTO: 1.2 %
BUN SERPL-MCNC: 10 MG/DL (ref 7–30)
CALCIUM SERPL-MCNC: 7.7 MG/DL (ref 8.5–10.1)
CHLORIDE SERPL-SCNC: 109 MMOL/L (ref 94–109)
CO2 SERPL-SCNC: 26 MMOL/L (ref 20–32)
CREAT SERPL-MCNC: 0.91 MG/DL (ref 0.66–1.25)
DIFFERENTIAL METHOD BLD: ABNORMAL
EOSINOPHIL # BLD AUTO: 0.6 10E9/L (ref 0–0.7)
EOSINOPHIL NFR BLD AUTO: 5.2 %
ERYTHROCYTE [DISTWIDTH] IN BLOOD BY AUTOMATED COUNT: 17.5 % (ref 10–15)
GFR SERPL CREATININE-BSD FRML MDRD: 81 ML/MIN/{1.73_M2}
GLUCOSE SERPL-MCNC: 109 MG/DL (ref 70–99)
HCT VFR BLD AUTO: 27.5 % (ref 40–53)
HGB BLD-MCNC: 8.8 G/DL (ref 13.3–17.7)
IMM GRANULOCYTES # BLD: 0.2 10E9/L (ref 0–0.4)
IMM GRANULOCYTES NFR BLD: 2 %
LYMPHOCYTES # BLD AUTO: 2 10E9/L (ref 0.8–5.3)
LYMPHOCYTES NFR BLD AUTO: 16.6 %
MAGNESIUM SERPL-MCNC: 2 MG/DL (ref 1.6–2.3)
MCH RBC QN AUTO: 31.8 PG (ref 26.5–33)
MCHC RBC AUTO-ENTMCNC: 32 G/DL (ref 31.5–36.5)
MCV RBC AUTO: 99 FL (ref 78–100)
MONOCYTES # BLD AUTO: 1.4 10E9/L (ref 0–1.3)
MONOCYTES NFR BLD AUTO: 11.5 %
NEUTROPHILS # BLD AUTO: 7.6 10E9/L (ref 1.6–8.3)
NEUTROPHILS NFR BLD AUTO: 63.5 %
NRBC # BLD AUTO: 0 10*3/UL
NRBC BLD AUTO-RTO: 0 /100
PHOSPHATE SERPL-MCNC: 2.6 MG/DL (ref 2.5–4.5)
PLATELET # BLD AUTO: 472 10E9/L (ref 150–450)
POTASSIUM SERPL-SCNC: 3.5 MMOL/L (ref 3.4–5.3)
RBC # BLD AUTO: 2.77 10E12/L (ref 4.4–5.9)
SODIUM SERPL-SCNC: 140 MMOL/L (ref 133–144)
WBC # BLD AUTO: 12 10E9/L (ref 4–11)

## 2020-06-04 PROCEDURE — 84100 ASSAY OF PHOSPHORUS: CPT | Performed by: INTERNAL MEDICINE

## 2020-06-04 PROCEDURE — 99239 HOSP IP/OBS DSCHRG MGMT >30: CPT | Performed by: INTERNAL MEDICINE

## 2020-06-04 PROCEDURE — 97535 SELF CARE MNGMENT TRAINING: CPT | Mod: GO

## 2020-06-04 PROCEDURE — 25000132 ZZH RX MED GY IP 250 OP 250 PS 637: Mod: GY | Performed by: INTERNAL MEDICINE

## 2020-06-04 PROCEDURE — 25000128 H RX IP 250 OP 636: Performed by: HOSPITALIST

## 2020-06-04 PROCEDURE — 80048 BASIC METABOLIC PNL TOTAL CA: CPT | Performed by: INTERNAL MEDICINE

## 2020-06-04 PROCEDURE — 85025 COMPLETE CBC W/AUTO DIFF WBC: CPT | Performed by: INTERNAL MEDICINE

## 2020-06-04 PROCEDURE — 25000128 H RX IP 250 OP 636: Performed by: INTERNAL MEDICINE

## 2020-06-04 PROCEDURE — 83735 ASSAY OF MAGNESIUM: CPT | Performed by: INTERNAL MEDICINE

## 2020-06-04 PROCEDURE — 25000132 ZZH RX MED GY IP 250 OP 250 PS 637: Mod: GY | Performed by: PHYSICIAN ASSISTANT

## 2020-06-04 PROCEDURE — 99232 SBSQ HOSP IP/OBS MODERATE 35: CPT | Performed by: INTERNAL MEDICINE

## 2020-06-04 PROCEDURE — G0463 HOSPITAL OUTPT CLINIC VISIT: HCPCS | Mod: 25

## 2020-06-04 RX ORDER — METOPROLOL TARTRATE 25 MG/1
12.5 TABLET, FILM COATED ORAL 2 TIMES DAILY
Qty: 60 TABLET | Refills: 0 | DISCHARGE
Start: 2020-06-04 | End: 2020-07-09 | Stop reason: DRUGHIGH

## 2020-06-04 RX ORDER — NITROGLYCERIN 0.4 MG/1
TABLET SUBLINGUAL
Qty: 20 TABLET | Refills: 0 | DISCHARGE
Start: 2020-06-04 | End: 2020-07-09

## 2020-06-04 RX ORDER — OXYCODONE HYDROCHLORIDE 5 MG/1
2.5 TABLET ORAL EVERY 8 HOURS PRN
Qty: 15 TABLET | Refills: 0 | Status: SHIPPED | OUTPATIENT
Start: 2020-06-04 | End: 2020-07-03

## 2020-06-04 RX ORDER — LEVOFLOXACIN 750 MG/1
750 TABLET, FILM COATED ORAL DAILY
Qty: 3 TABLET | Refills: 0 | DISCHARGE
Start: 2020-06-04 | End: 2020-06-14

## 2020-06-04 RX ORDER — DIGOXIN 250 MCG
250 TABLET ORAL DAILY
Qty: 30 TABLET | Refills: 0 | DISCHARGE
Start: 2020-06-05 | End: 2020-06-17 | Stop reason: DRUGHIGH

## 2020-06-04 RX ADMIN — CLOPIDOGREL BISULFATE 75 MG: 75 TABLET, FILM COATED ORAL at 09:01

## 2020-06-04 RX ADMIN — CEFTRIAXONE 2 G: 2 INJECTION, POWDER, FOR SOLUTION INTRAMUSCULAR; INTRAVENOUS at 09:02

## 2020-06-04 RX ADMIN — SODIUM CHLORIDE, PRESERVATIVE FREE 10 ML: 5 INJECTION INTRAVENOUS at 06:19

## 2020-06-04 RX ADMIN — ASPIRIN 81 MG: 81 TABLET, DELAYED RELEASE ORAL at 09:01

## 2020-06-04 RX ADMIN — ATORVASTATIN CALCIUM 40 MG: 40 TABLET, FILM COATED ORAL at 09:01

## 2020-06-04 RX ADMIN — DIGOXIN 250 MCG: 125 TABLET ORAL at 09:01

## 2020-06-04 RX ADMIN — POTASSIUM CHLORIDE 20 MEQ: 1500 TABLET, EXTENDED RELEASE ORAL at 09:10

## 2020-06-04 ASSESSMENT — MIFFLIN-ST. JEOR: SCORE: 1451.58

## 2020-06-04 NOTE — PLAN OF CARE
Alert and oriented x 4 . VS stable despite  BP on the low side, he is asymptomatic with lower pressures. Patient was discharged this afternoon with [facility] staff as transport to TCU. No new medications were sent with the patient. I reviewed all DC paperwork,new medications, orders and appointments with the patient and he was able to verbalize understanding of teaching. All questions answered. All IVs were removed and patient had all belongings at the time of DC.

## 2020-06-04 NOTE — DISCHARGE INSTRUCTIONS
Discharge Instructions for Coronary Angioplasty and Stenting  During your angioplasty, a doctor inserts a thin tube called a catheter into a blood vessel in your groin or wrist. The catheter is pushed through your blood vessel to a blocked area in one of your heart s arteries. The doctor inflates a tiny balloon at the tip of the catheter and stretches the blocked vessel so blood can flow freely. The balloon is then deflated and removed with the catheter. The doctor may also insert a metal mesh tube called a stent in the blocked vessel. The stent helps the vessel stay open. You may get several stents if you have blockages in more than one of your arteries.  Home care    Ask someone to drive you to your appointments for the next few days.    Rest for 2 to 3 days after the procedure. Most people are able to go back to normal activity within a few days.    Take your temperature and check your incision for signs of infection every day for a week. Signs of infection include redness, swelling, drainage, or warmth. It is normal to have a small bruise or bump where the catheter was inserted.    Take your medicines exactly as directed. Don t skip doses. It is important to take aspirin or other similar medicines for as long as your doctor advises. If you were also prescribed clopidogrel, prasugrel, or ticagrelor, it is very important to take these medicines, as well. These medicines prevent clots that could cause a heart attack. If you have a problem with any of your medicines, call healthcare provider right away. Call your provider right away if you have extra bleeding, but go to the emergency room if the bleeding can't be controlled.    Unless told otherwise, drink plenty of fluids to help flush your body of the dye that was used during your angioplasty. Let your healthcare provider know if the color of your urine changes and doesn't return to normal color.    Eat a healthy diet that is low in fat, salt, and cholesterol.  Ask your healthcare team for menus and other diet information.    Exercise according to your healthcare team's recommendation. Depending on your case, your team may recommend you start a cardiac rehabilitation program. Cardiac rehab is an exercise program in which trained healthcare staff monitor your progress and stress on your heart while you exercise. Ask how to enroll if your team recommends this program.    Don't swim or take a bath for 5 to 7 days. You may shower the day after the procedure. This keeps the incision site from getting wet and infected until the skin and artery can heal.  Follow-up care    Make a follow-up appointment as directed by our staff. Follow-up appointments are usually scheduled for 2 to 4 weeks after an angioplasty or coronary stent procedure.    Have a yearly checkup to make sure you are still doing well and not having any new symptoms.    Don't wait for a follow-up appointment if your medicines are not working or you are having heart-related symptoms.     When to call your healthcare provider  Call your healthcare provider right away if you have any of the following:    Chest pain or a return of the symptoms you had prior to the angioplasty    Constant or increasing pain or numbness in your leg, or if your leg looks blue or feels cold    Fever above 100.4 F (38.0 C) or other signs of infection (redness, swelling, drainage, or warmth at the incision site of the leg or wrist)    Shortness of breath    Bleeding, bruising, or a large swelling where the catheter (tube) was inserted    Blood in your urine    Black or tarry stools    Feeling faint    Difficulty speaking or weakness in any muscle   Date Last Reviewed: 10/1/2016    1203-2164 [employer]. [address] [place] [address], [place] [address]. All rights reserved. This information is not intended as a substitute for professional medical care. Always follow your healthcare professional's instructions.

## 2020-06-04 NOTE — PROGRESS NOTES
SW:  D:  Received discharge orders for patient.  Bed available at [facility] for today.  Patient will transport, via the skyway, around 13:00 today.  Patient informed of the plan and in agreement to the plan.  Call placed to update patient's wife and she is in agreement to the plan.  Call placed to update [facility] and faxed the orders and the PAS.      PAS-RR    D: Per DHS regulation, SW completed and submitted PAS-RR to [place] [employer] Direct Connect via the Senior LinkAge Line.  PAS-RR confirmation # is : [id number].    I: SW spoke with patient and wife and they are aware a PAS-RR has been submitted.  SW reviewed with patient and wife that they may be contacted for a follow up appointment within 10 days of hospital discharge if their SNF stay is < 30 days.  Contact information for [facility] LinkAge Line was also provided.    A: Patient and wife verbalized understanding.    P: Further questions may be directed to [facility] LinkAge Line at #[phone], option #4 for PAS-RR staff.      DAVIE Shell, MaineGeneral Medical CenterSW  Lead   [phone]  [facility]

## 2020-06-04 NOTE — PROGRESS NOTES
[facility]    Cardiology Progress Note    Date of Admission: 05/18/2020  Service date: 06/03/2020    Summary:  Mr. Efrem Ramos is a very pleasant 74 year old male with a history of MEL, PVD, HLD, CVA 2015, atrial fibrillation, severe aortic stenosis and mild to moderate mitral valve stenosis admitted on 5/18/2020. He underwent and elective cardiac catheterization and RCA intervention as part of an outpatient TAVR workup. Post-procedurally he unfortunately developed persistent bleeding from his left femoral access site and subsequent hemorrhagic shock.     Assessment & Plan   1. Postprocedural shock, hemorrhagic  - Complicated by severe aortic stenosis. Required pressors temporarily post procedure, now improved s/p 4 transfusions of PRBCs this admission.  - BP remain soft but stable.   2. Severe aortic stenosis   - Awaiting TAVR as an outpatient after recovery from recent PCI and hemorrhagic shock.  3. Severe single-vessel right coronary artery disease   - Status post complex PCI with rotational atherectomy and 4 stents to the proximal to distal RCA on 5/18/20.   - Complicated by RFA bleeding into the groin, penis, and scrotum without retroperitoneal bleeding or pseudoaneurysm.   4. Chronic atrial fibrillation   - Anticoagulated on Eliquis PTA with history of stroke. Off Eliquis currently due to bleeding.   - PTA on metoprolol succinate 12.5 mg in the morning and 25 mg in the evening. Currently on metoprolol tartrate 12.5 mg BID and digoxin 250 mcg daily. BP has been too low to allow titration of beta blocker with doses of metoprolol intermittently held due to hypotension.  - Rate control adequate with the increased dose of digoxin.  5. Peripheral vascular disease  6. History of CVA in 2015   - No significant residual neurological deficit. Okay to be off Eliquis until confirmation of no re-bleeding.  7. Acute blood loss anemia  - Hgb stabilized now at 8.8 today. Status post transfusions  with 4 units of PRBCs this admission. Transfuse for hgb <7.  8. Physical deconditioning   - Patient remains quite weak and with a poor appetite. Continue with PT/OT and try to increase activity. Plan for TCU stay at discharge for continued rehab, PT/OT.     Plan:   1. Continue with aspirin and plavix.  2. Continue holding Eliquis for now. Plan to restart Eliquis and stop aspirin once bleeding risk has subsided and anemia improved. Likely in about 2 weeks and then can consider stopping ASA and restarting Apixaban with Plavix.   3. Continue digoxin 250 mcg daily and metoprolol tartrate 12.5 mg BID for rate control.   4. TAVR not yet scheduled. He should recover well from this episode prior to TAVR if possible.   5. Okay from a cardiac standpoint for discharge to TCU today.  6. Will arrange for follow up with a [facility] PA-C in 1-2 weeks post discharge with a digoxin level beforehand.     Disposition Plan   Expected discharge in 0-1 days to transitional care unit pending placement.     Entered: Pedro Grimes 06/04/2020, 9:52 AM     Interval History   Patient is resting comfortably. His BPs remain soft intermittently but stable. His breathing is improved today and he is now on room air with O2 sats in the mid to upper 90s. He remains somewhat weak and with some postural dizziness intermittently He otherwise denies chest pain or palpitations. His groin remains tender with movement with pain control with PRN tylenol.    Telemetry: Bret     Thank you for the opportunity to participate in this pleasant patient's care.     Seth Grimes, FABI, CNP   Text Page  (8am - 5pm, M-F)    Patient Active Problem List   Diagnosis     Encephalopathy     Mixed hyperlipidemia     Other specified disorder of skin     MEL (obstructive sleep apnea)     PVD (peripheral vascular disease) (H)     Decreased diffusion capacity     Anemia     Vocal cord cancer (H)     Lower extremity edema     Lymphocytic colitis     Mitral valve  problem     IgG monoclonal gammopathy     CARDIOVASCULAR SCREENING; LDL GOAL LESS THAN 100     Vitamin B12 deficiency disease     Elevated ferritin     Collagenous colitis     Redundant colon     Rash and nonspecific skin eruption     Cerebral infarction (H)     Alcohol abuse     Atrial fibrillation and flutter (H)     Health Care Home     Atrial fibrillation (H)     Hypoxia     Acute respiratory failure with hypoxia (H)     Physical deconditioning     Urinary retention     Community acquired pneumonia     Constipation     Pneumonia     CAD (coronary artery disease)-moderate  nonobstructive 2 vessel     Hypokalemia     Non-traumatic compression fracture of L1 lumbar vertebra with routine healing, subsequent encounter     Age-related osteoporosis with current pathological fracture with routine healing, subsequent encounter     Benign essential hypertension     Coronary artery disease involving native coronary artery of native heart with angina pectoris (H)     Other secondary pulmonary hypertension (H)     Edema due to malnutrition, due to unspecified malnutrition type (H)     Abnormal findings on diagnostic imaging of heart and coronary circulation     Coronary artery disease involving native coronary artery of native heart without angina pectoris     Status post coronary angiogram     Bleeding     Nonrheumatic aortic valve stenosis     Cardiogenic shock (H)       Physical Exam   Temp: 99  F (37.2  C) Temp src: Oral BP: 90/61 Pulse: 128 Heart Rate: 78 Resp: 16 SpO2: 92 % O2 Device: None (Room air) Oxygen Delivery: 1/2 LPM  Vitals:    06/02/20 0516 06/03/20 0532 06/04/20 0629   Weight: 71.8 kg (158 lb 3.2 oz) 71.9 kg (158 lb 8 oz) 76.3 kg (168 lb 3.2 oz)     Vital Signs with Ranges  Temp:  [98.7  F (37.1  C)-99  F (37.2  C)] 99  F (37.2  C)  Pulse:  [] 128  Heart Rate:  [] 78  Resp:  [16-18] 16  BP: ()/(53-74) 90/61  SpO2:  [92 %-98 %] 92 %  I/O last 3 completed shifts:  In: 380 [P.O.:340;  I.V.:40]  Out: 1675 [Urine:1675]    Constitutional: Pleasant, frail appearing elderly gentleman in no acute distress.  Eyes: Pupils equal, round. Sclerae anicteric.   HEENT: Normocephalic, atraumatic.   Respiratory: Breathing non-labored. Lungs clear bilaterally. No crackles, wheezes, rhonchi, or rales.  Cardiovascular: Irregularly irregular rhythm, fast rate, grade 2/6 systolic murmur best heard at the LUSB, no rub or gallop. Femoral pulses 2+ bilaterally.   Skin: Warm, dry. Significant healing ecchymosis throughout the groin to both femorals and down to the scrotum.   Musculoskeletal/Extremities: Moves all extremities well and symmetrically. No lower extremity edema bilaterally.  Neurologic: No gross focal deficits. Alert, awake, and oriented to person, place and time.  Psychiatric: Affect appropriate. Mentation normal.    Medications     - MEDICATION INSTRUCTIONS -       - MEDICATION INSTRUCTIONS -       - MEDICATION INSTRUCTIONS -       - MEDICATION INSTRUCTIONS -       Percutaneous Coronary Intervention orders placed (this is information for BPA alerting)       ACE/ARB/ARNI NOT PRESCRIBED         aspirin  81 mg Oral Daily     atorvastatin  40 mg Oral Daily     cefTRIAXone  2 g Intravenous Q24H     clopidogrel  75 mg Oral Daily     digoxin  250 mcg Oral Daily     gabapentin  600 mg Oral QPM     heparin lock flush  5-10 mL Intracatheter Q24H     metoprolol tartrate  12.5 mg Oral BID     Data   Results for orders placed or performed during the hospital encounter of 05/18/20 (from the past 24 hour(s))   Lactic acid level STAT   Result Value Ref Range    Lactate for Sepsis Protocol 0.8 0.7 - 2.0 mmol/L   CBC with platelets differential   Result Value Ref Range    WBC 12.0 (H) 4.0 - 11.0 10e9/L    RBC Count 2.77 (L) 4.4 - 5.9 10e12/L    Hemoglobin 8.8 (L) 13.3 - 17.7 g/dL    Hematocrit 27.5 (L) 40.0 - 53.0 %    MCV 99 78 - 100 fl    MCH 31.8 26.5 - 33.0 pg    MCHC 32.0 31.5 - 36.5 g/dL    RDW 17.5 (H) 10.0 - 15.0 %     Platelet Count 472 (H) 150 - 450 10e9/L    Diff Method Automated Method     % Neutrophils 63.5 %    % Lymphocytes 16.6 %    % Monocytes 11.5 %    % Eosinophils 5.2 %    % Basophils 1.2 %    % Immature Granulocytes 2.0 %    Nucleated RBCs 0 0 /100    Absolute Neutrophil 7.6 1.6 - 8.3 10e9/L    Absolute Lymphocytes 2.0 0.8 - 5.3 10e9/L    Absolute Monocytes 1.4 (H) 0.0 - 1.3 10e9/L    Absolute Eosinophils 0.6 0.0 - 0.7 10e9/L    Absolute Basophils 0.2 0.0 - 0.2 10e9/L    Abs Immature Granulocytes 0.2 0 - 0.4 10e9/L    Absolute Nucleated RBC 0.0    Basic metabolic panel   Result Value Ref Range    Sodium 140 133 - 144 mmol/L    Potassium 3.5 3.4 - 5.3 mmol/L    Chloride 109 94 - 109 mmol/L    Carbon Dioxide 26 20 - 32 mmol/L    Anion Gap 5 3 - 14 mmol/L    Glucose 109 (H) 70 - 99 mg/dL    Urea Nitrogen 10 7 - 30 mg/dL    Creatinine 0.91 0.66 - 1.25 mg/dL    GFR Estimate 81 >60 mL/min/[1.73_m2]    GFR Estimate If Black >90 >60 mL/min/[1.73_m2]    Calcium 7.7 (L) 8.5 - 10.1 mg/dL   Magnesium   Result Value Ref Range    Magnesium 2.0 1.6 - 2.3 mg/dL   Phosphorus   Result Value Ref Range    Phosphorus 2.6 2.5 - 4.5 mg/dL     This note was completed in part using Dragon voice recognition software. Although reviewed after completion, some word and grammatical errors may occur.

## 2020-06-04 NOTE — PROGRESS NOTES
"CLINICAL NUTRITION SERVICES - REASSESSMENT NOTE    Recommendations Ordered by Registered Dietitian (RD):   - Continue supplements as ordered. Chocolate Boost BID between meals  - Continue \"Not appropriate for room service\" --> Pt feels this is the best option for him.    Malnutrition:   % Weight Loss:  None noted  % Intake:  <75% for > 7 days (non-severe malnutrition) --> or worse  Subcutaneous Fat Loss:  Deferred  Muscle Loss:  Deferred  Fluid Retention:  Moderate-Severe    Malnutrition Diagnosis: Non-Severe malnutrition  In Context of:  Chronic illness or disease     EVALUATION OF PROGRESS TOWARD GOALS   Diet: DD2 + Thin Liquids  Boost BID between meals   Room Service Not Appropriate     Intake/Tolerance:   - Spoke to patient over the phone. He states that he has not much of an appetite, which has been the case for years. Generally he consumes just 1 small meal/day (consisting of soup or similar).   - Has been drinking 1-2 of the Boost Plus daily (350 kcal, 14 g pro per serving)   - Given that he is on a \"Room Service Not Appropriate\" plan, he automatically receives standard diet-appropriate meals TID. He feels this works just fine, as he generally does not know what he might want for a meal anyway. \"It's kind of like a surprise\". He tends to pick at what looks good \"nibble on what catches my eye\", though states \"I am ashamed to say I end up sending most of it back\".   - Flowsheet documentation shows 0-75% intakes, with the average falling somewhere between 25-50% of meal consumption.   - Patient does not drink coffee due to colitis (was recommended to him in the past). Notes that it frequently comes on his tray --> will enter preference.     ASSESSED NUTRITION NEEDS PER APPROVED PRACTICE GUIDELINES:  Dosing Weight: 71.8 kg - lowest of admission   Estimated Energy Needs: 6684-1247 kcals (25-30 Kcal/Kg)  Justification: maintenance  Estimated Protein Needs:  grams protein (1.2-1.5 g pro/Kg)  Justification: " preservation of lean body mass  Estimated Fluid Needs: 1 mL/kcal  Justification: maintenance    NEW FINDINGS:   - Pt w/ diarrhea. Last BM recorded 6/3.   - Labs reviewed  - Moderate-Severe Edema recorded today  - Meds reviewed     Previous Nutrition Diagnosis:   No nutrition diagnosis at this time.  Evaluation: Declining    MALNUTRITION  % Weight Loss:  None noted  % Intake:  <75% for > 7 days (non-severe malnutrition) --> or worse  Subcutaneous Fat Loss:  Deferred  Muscle Loss:  Deferred  Fluid Retention:  Moderate-Severe    Malnutrition Diagnosis: Non-Severe malnutrition  In Context of:  Chronic illness or disease    CURRENT NUTRITION DIAGNOSIS  Inadequate oral intake related to baseline poor appetite as evidenced by baseline intake of just 1 small meal/day, eating 25-50% on average of meals TID while inpatient.     INTERVENTIONS  Recommendations / Nutrition Prescription  Continue diet as ordered - Per SLP    Boost Plus BID between meals (likes the chocolate flavor)    Continue Not appropriate for room service - patient feels this works best for him    Implementation  None new today. See recommendations and POC abvoe.     Goals:  Patient to consume at least 50% meals and 1 supplement daily.     MONITORING AND EVALUATION:  Progress towards goals will be monitored and evaluated per protocol and Practice Guidelines    Manjula Lau RD, LD  Pager: [phone]

## 2020-06-04 NOTE — PLAN OF CARE
DATE & TIME: 6/3/2020 1900 - 6/4/2020 0700    COGNITION/BEHAVIOR: A&Ox4  Vital signs:  Temp: 99  F (37.2  C) Temp src: Oral BP: 96/58 Pulse: 93 Heart Rate: 78 Resp: 16 SpO2: 92 % via RA  TELEMETRY RHYTHM: A-Fib CVR  MOBILITY: SBA with walker  PAIN: Reported groin pain at HS; PRN oxycodone effective  DIET: DD2 with thin liquids, no straw, no chin tuck, meds crushed  RESP: RML with crackles, CECIL coarse.  HIGH.  CV/PV: Irregular apical pulse. Murmur.  3-4+ hip and scrotal edema, trace LE edema.  Refused PCD's.  GI/: BS active.  Voiding adequately.  SKIN: Bruised groin  LINES: Right internal jugular CVC heparin locked  LAB/BG: Sepsis risk fired for tachycardia and leukocytosis with lactic acid 0.8

## 2020-06-04 NOTE — DISCHARGE SUMMARY
[facility]    Discharge Summary  Hospitalist    Date of Admission:  5/18/2020  Date of Discharge:  6/4/2020  Discharging Provider: Ramesh Marie MD  Date of Service (when I saw the patient): 06/04/20    Discharge Diagnoses     Postprocedure massive hematoma, left groin/scrotum  Hemorrhagic shock, resolved  Acute blood loss anemia  Chronic atrial fibrillation with RVR  CAD s/p PCIx4 to RCA (5/18/2020)  Hypertension  Hyperlipidemia  Severe aortic stenosis  Sepsis (Leukocytosis, lactic acidosis with hypotension, tachycardia)  Bilateral pulmonary infiltrates concerning for pna vs CHF  MEL    History of Present Illness   Efrem Ramos is a 74 year old male with a past medical history of obstructive sleep apnea, hyperlipidemia, CVA, A. fib on chronic anticoagulation with Eliquis, coronary artery disease, mitral stenosis, and severe aortic stenosis who was admitted on 5/18/2020 following complications after an elective heart catheterization with stent placement and rotational artherectomy of RCA in anticipation of TAVR.  Postprocedure, patient was identified to have persistent cath site bleeding with development of massive hematoma and associated hemorrhagic shock.  Patient was identified to have active extravasation from the left common femoral artery. Hemostasis was achieved with ultrasound-guided direct pressure. He was resuscitated and admitted to the ICU with ongoing cares.  Once off of pressors patient transferred out of ICU/hospitalist contacted for resumption of care.    Hospital Course   Efrem Ramos was admitted on 5/18/2020.  The following problems were addressed during his hospitalization:    Postprocedure massive hematoma, left groin/scrotum  Hemorrhagic shock, resolved  Acute blood loss anemia  Postprocedure, developed acute hypotension with rapid development of groin/scrotal hematoma. In setting of chronic AC with Eliquis for afib, had been held for 3d in anticipation of procedure.  Required initiation of massive transfusion protocol, pressor support.  CTA A/P revealed active extravasation from left common femoral artery into left groin with extensive hemorrhage. Vascular surgery was urgently consulted, femstop repositioned and hemostasis achieved with direct ultrasound-guided pressure.   - Pt received a total of 4u PRBCs, last transfusion 5/21 for Hgb 7.8 and persistent hypotension.  Posttransfusion hemoglobin was 9.7  - given IV iron x 2 2/2 low iron  - fluctuating between ~8-9 since, at time of discharge stable at 8.8  - On DAPT with ASA/Plavix given recent PCI. Holding Eliquis for ~2 weeks, restart as per cardiology  - Urology consulted given scrotal hematoma, recommended scrotal support & elevation.  Urology reconsulted given significant PVR's, started on Flomax. Recommended follow up visit in 4 weeks or earlier if needed. Will have PVR checked at TCU  - prn oxycodone q8 at time of discharge  - US no recurrent pseudoaneurysm 5/29, noted residual hematoma left groin.     Chronic Afib with RVR  Hx afib on chronic AC with Eliquis. PTA maintained on metoprolol for rate control.   Has episodic RVR with activity.  He had an emergent cardiac catheterization 5/28/2020 secondary to acute hypotension, tachycardia, lactate firing.  Final report pending (as of 6/3 not completed), verbal report from Cardiology,   cites  EF WNL, gradient across the aortic valve not as severe as had been supposed with TAVR candidacy  - PTA metoprolol decreased to 12.5 mg BID at discharge  - digoxin started and will discharge on 250 mcg daily  - Cardiology planning to hold off on Eliquis for now, restart following outpatient follow-up in 2 weeks/ when safe given bleed  - he has transient tachycardia to 120's with ambulation but improve with rest. BP's also drop to upper 80's systolic/ low 90's systolic intermittently. Ok if remains asymptomatic and cardiology has cleared for discharge.     CAD s/p PCIx4 to RCA  (5/18/2020)  Hypertension  Hyperlipidemia  Pt with identified RCA disease on initial angiogram for TAVR workup performed 3/23/20, however due to uncertainty on repair approach (surgical v percutaneous) of aortic valve, RCA was not intervened upon at that time. CT TAVR was subsequently performed and indicated favorable anatomy for TAVR. Subsequently underwent angiogram 5/18/2020 with complex PCI, adjunctive rotational arthrectomy with DESx4 from proximal to distal RCA.  - DAPT with ASA/ plavix, further anticoagulation per cardiology  - Restarted low-dose metoprolol 5/24/2020, see above     Severe Aortic Stenosis  Undergoing workup for eventual outpatient TAVR. TAVR currently on hold related give bleed  -TAVR follow-up as per cardiology     Sepsis (Leukocytosis, lactic acidosis with hypotension, tachycardia)  Bilateral pulmonary infiltrates concerning for pna vs CHF  CXR 5/29 with new bilateral interstitial infiltrates suggesting infection or edema. With lactate at 3.3 was started on vanco/ceftx (pcn allergy).    - SLP following and appreciate assistance  - ? CHF vs pna. Very difficult to diurese given severe aortic stenosis and hypotension. Improved on IV abx. vanco discontinued. Will continue 3 more days of levofloxacin to complete 10 day course for pneumonia.   - 5/28 blood cultures no growth     MEL  CPAP with home settings     Ramesh Marie M.D.  Hospitalist  Pager [phone]    Significant Results and Procedures   CXR  Video swallow study  CT head  CT angiogram  US lower extremity  CT abdomen pelvis  Coronary angiogram  echocardiogram    Pending Results   None    Code Status   Full Code       Primary Care Physician   Danny Paige MD    Physical Exam   Temp: 99  F (37.2  C) Temp src: Oral BP: 90/61 Pulse: 128 Heart Rate: 78 Resp: 16 SpO2: 92 % O2 Device: None (Room air) Oxygen Delivery: 1/2 LPM  Vitals:    06/02/20 0516 06/03/20 0532 06/04/20 0629   Weight: 71.8 kg (158 lb 3.2 oz) 71.9 kg (158  lb 8 oz) 76.3 kg (168 lb 3.2 oz)     Vital Signs with Ranges  Temp:  [98.7  F (37.1  C)-99  F (37.2  C)] 99  F (37.2  C)  Pulse:  [] 128  Heart Rate:  [] 78  Resp:  [16-18] 16  BP: ()/(53-74) 90/61  SpO2:  [92 %-98 %] 92 %  I/O last 3 completed shifts:  In: 380 [P.O.:340; I.V.:40]  Out: 1675 [Urine:1675]    Constitutional: Alert, oriented, no acute distress  Respiratory: Lungs clear to auscultation bilaterally, no wheezes, no crackles  Cardiovascular: Regular rate and irregular rhythm, prominent PRAFUL  GI: Soft, non-tender, non-disteneded, good bowel sounds  Skin/Integumen: No erythema, cyanosis or edema. Large ecchymoses involving his groin, lower abdominal wall. Pronounced tense scrotal swelling  Other:      Discharge Disposition   Discharged to short-term care facility  Condition at discharge: Stable    Consultations This Hospital Stay   VASCULAR SURGERY IP CONSULT  PHYSICAL THERAPY ADULT IP CONSULT  OCCUPATIONAL THERAPY ADULT IP CONSULT  CARDIOLOGY IP CONSULT  UROLOGY IP CONSULT  SOCIAL WORK IP CONSULT  UROLOGY IP CONSULT  PHARMACY IP CONSULT  PHARMACY IP CONSULT  PHARMACY IP CONSULT  PHARMACY IP CONSULT  PHARMACY TO DOSE VANCO  SWALLOW EVAL SPEECH PATH AT BEDSIDE IP CONSULT  NUTRITION SERVICES ADULT IP CONSULT  PHYSICAL THERAPY ADULT IP CONSULT  OCCUPATIONAL THERAPY ADULT IP CONSULT  SPEECH LANGUAGE PATH ADULT IP CONSULT  SMOKING CESSATION PROGRAM IP CONSULT  SMOKING CESSATION PROGRAM IP CONSULT    Time Spent on this Encounter   I, Ramesh Marie MD, personally saw the patient today and spent greater than 30 minutes discharging this patient.    Discharge Orders      Discharge Order: F/U with Cardiac  SHAWN      Discharge Instructions - IF on Metformin (Glucophage or Glucovance) or Metformin containing medications    IF on Metformin (Glucophage or Glucovance) or Metformin containing medications , schedule a Basic Metabolic Panel at [facility] in 48 - 72 hours post procedure and  PRIOR TO resuming the Metformin or Metformin containing medications.  Hold Metformin (Glucophage or Glucovance) or Metformin containing medications until after the Basic Metabolic Panel on the 2nd or 3rd day following the procedure.  May resume after blood draw is complete.     Discharge Instructions - Follow up with [facility] Nurse Practitioner     Follow up with [facility] Nurse Practitioner at [facility] of patient preference in 7-10 days.     Discharge Instructions - IF on Metformin (Glucophage or Glucovance) or Metformin containing medications    IF on Metformin (Glucophage or Glucovance) or Metformin containing medications , schedule a Basic Metabolic Panel at [facility] or Primary Clinic in 48 - 72 hours post procedure and PRIOR TO resuming the Metformin or Metformin containing medications.  Hold Metformin (Glucophage or Glucovance) or Metformin containing medications until after the Basic Metabolic Panel on the 2nd or 3rd day following the procedure.  May resume after blood draw is complete.     Discharge Instructions - Follow up with [facility] Nurse Practitioner     Follow up with [facility] Nurse Practitioner at [facility] of patient preference in 7-10 days.     General info for SNF    Length of Stay Estimate: Short Term Care: Estimated # of Days <30  Condition at Discharge: Improving  Level of care:skilled   Rehabilitation Potential: Good  Admission H&P remains valid and up-to-date: Yes  Recent Chemotherapy: N/A  Use Nursing Home Standing Orders: Yes     Mantoux instructions    Give two-step Mantoux (PPD) Per Facility Policy Yes     Reason for your hospital stay    You were hospitalized secondary to a bleed into your groin following a cardiac procedure.     Glucose monitor nursing POCT    Before meals and at bedtime     Daily weights    Call Provider for weight gain of more than 2 pounds per day or 5 pounds per week.     Bladder scan    X 2 for post void residual     Follow Up and recommended  labs and tests    Follow up with shelter physician.  The following labs/tests are recommended: cbc, bmp in 2-3 days.     Activity - Up with nursing assistance     Full Code     Nutrition Services Adult IP Consult    Reason:  Malnutrition s/p complex hospitalization     Physical Therapy Adult Consult    Evaluate and treat as clinically indicated.    Reason:  Weakness/ deconditioning s/p hospitalization     Occupational Therapy Adult Consult    Evaluate and treat as clinically indicated.    Reason:  Weakness/ deconditioning s/p hospitalization     Speech Language Path Adult Consult    Evaluate and treat as clinically indicated.    Reason:  dysphagia     Advance Diet as Tolerated    Follow this diet upon discharge: Orders Placed This Encounter      Snacks/Supplements Adult: Boost Plus; Between Meals      Room Service      Combination Diet Dysphagia Diet Level 2: Mechan Altered; Thin Liquids (water, ice chips, juice, milk gelatin, ice cream, etc) (NO straws and NO chin tuck. )     Discharge Medications   Current Discharge Medication List      START taking these medications    Details   !! clopidogrel (PLAVIX) 75 MG tablet Take 1 tablet (75 mg) by mouth daily  Qty: 90 tablet, Refills: 3    Associated Diagnoses: Coronary artery disease involving native coronary artery of native heart without angina pectoris      !! clopidogrel (PLAVIX) 75 MG tablet Take 1 tablet (75 mg) by mouth daily  Qty: 90 tablet, Refills: 3    Associated Diagnoses: Coronary artery disease involving native coronary artery of native heart without angina pectoris      digoxin (LANOXIN) 250 MCG tablet Take 1 tablet (250 mcg) by mouth daily  Qty: 30 tablet, Refills: 0    Associated Diagnoses: Persistent atrial fibrillation      levofloxacin (LEVAQUIN) 750 MG tablet Take 1 tablet (750 mg) by mouth daily  Qty: 3 tablet, Refills: 0    Associated Diagnoses: Pneumonia due to infectious organism, unspecified laterality, unspecified part of lung       metoprolol tartrate (LOPRESSOR) 25 MG tablet Take 0.5 tablets (12.5 mg) by mouth 2 times daily Hold for SBP<100 or HR<60  Qty: 60 tablet, Refills: 0    Associated Diagnoses: Persistent atrial fibrillation      nitroGLYcerin (NITROSTAT) 0.4 MG sublingual tablet For chest pain place 1 tablet under the tongue every 5 minutes for 3 doses. If symptoms persist 5 minutes after 1st dose call 911.  Qty: 20 tablet, Refills: 0    Associated Diagnoses: Other secondary pulmonary hypertension (H)      oxyCODONE (ROXICODONE) 5 MG tablet Take 0.5 tablets (2.5 mg) by mouth every 8 hours as needed for moderate to severe pain  Qty: 15 tablet, Refills: 0    Associated Diagnoses: Anemia due to blood loss, acute      Skin Protectants, Misc. (EUCERIN) cream Apply topically every hour as needed for dry skin  Qty: 120 g, Refills: 0    Associated Diagnoses: Anemia due to blood loss, acute       !! - Potential duplicate medications found. Please discuss with provider.      CONTINUE these medications which have CHANGED    Details   aspirin (ASA) 81 MG EC tablet Take 1 tablet (81 mg) by mouth daily Start tomorrow morning.  Qty: 90 tablet, Refills: 3    Associated Diagnoses: Coronary artery disease involving native coronary artery of native heart without angina pectoris         CONTINUE these medications which have NOT CHANGED    Details   acetaminophen (TYLENOL) 325 MG tablet Take 325-650 mg by mouth every 6 hours as needed for mild pain      atorvastatin (LIPITOR) 40 MG tablet Take 1 tablet (40 mg) by mouth daily  Qty: 90 tablet, Refills: 3    Associated Diagnoses: MGUS (monoclonal gammopathy of unknown significance)      calcium carbonate 600 mg-vitamin D 400 units (CALTRATE) 600-400 MG-UNIT per tablet Take 1 tablet by mouth 2 times daily      gabapentin (NEURONTIN) 300 MG capsule Take 2 capsules (600 mg) by mouth every evening  Qty: 60 capsule, Refills: 5    Associated Diagnoses: Restless legs syndrome (RLS)      melatonin 1 MG TABS tablet  Take 1-3 mg by mouth nightly as needed for sleep      multivitamin (OCUVITE) TABS Take 1 tablet by mouth 2 times daily       potassium chloride ER 20 MEQ PO CR tablet Take 1 tablet (20 mEq) by mouth daily  Qty: 90 tablet, Refills: 3    Associated Diagnoses: Aortic valve stenosis, etiology of cardiac valve disease unspecified      Cyanocobalamin 2000 MCG TABS Take 2,000 mcg by mouth every 7 days On Sundays      loperamide (IMODIUM) 2 MG capsule Take 2 mg by mouth 4 times daily as needed for diarrhea      polyethylene glycol (MIRALAX/GLYCOLAX) powder Take 1 capful by mouth daily as needed for constipation         STOP taking these medications       apixaban ANTICOAGULANT (ELIQUIS) 5 MG tablet Comments:   Reason for Stopping:         furosemide (LASIX) 20 MG tablet Comments:   Reason for Stopping:         isosorbide mononitrate (IMDUR) 30 MG 24 hr tablet Comments:   Reason for Stopping:         metoprolol succinate ER (TOPROL-XL) 25 MG 24 hr tablet Comments:   Reason for Stopping:         valACYclovir (VALTREX) 1000 mg tablet Comments:   Reason for Stopping:             Allergies   Allergies   Allergen Reactions     Sulfa Drugs Difficulty breathing     Adhesive Tape Blisters     Amiodarone Other (See Comments)     Developed pleural effusion     Penicillins Unknown     Reaction occurred as a child     Data   Most Recent 3 CBC's:  Recent Labs   Lab Test 06/04/20  0620 06/03/20  0530 06/01/20  0605   WBC 12.0* 13.5* 12.8*   HGB 8.8* 9.0* 8.4*   MCV 99 100 99   * 487* 438      Most Recent 3 BMP's:  Recent Labs   Lab Test 06/04/20  0620 06/03/20  0530 06/02/20  0530 06/01/20  0605    139  --  141   POTASSIUM 3.5 3.4 3.4 3.4   CHLORIDE 109 108  --  111*   CO2 26 25  --  24   BUN 10 10  --  9   CR 0.91 0.90 0.90 0.82   ANIONGAP 5 6  --  6   ULISSES 7.7* 7.9*  --  7.6*   * 74  --  87     Most Recent 2 LFT's:  Recent Labs   Lab Test 05/29/20  1310 05/28/20  1412   AST 28 27   ALT 15 15   ALKPHOS 71 88    BILITOTAL 1.4* 1.3     Most Recent INR's and Anticoagulation Dosing History:  Anticoagulation Dose History     Recent Dosing and Labs Latest Ref Rng & Units 9/4/2015 8/1/2016 9/5/2017 10/22/2018 3/23/2020 5/18/2020 5/18/2020    INR 0.86 - 1.14 1.02 1.17(H) 1.02 1.08 1.07 1.18(H) 1.51(H)        Most Recent 3 Troponin's:  Recent Labs   Lab Test 05/19/20  1736 05/19/20  1242 05/19/20  0850   TROPI 0.645* 0.692* 0.802*     Most Recent Cholesterol Panel:  Recent Labs   Lab Test 06/24/19  1023   CHOL 118   LDL 51   HDL 51   TRIG 80     Most Recent 6 Bacteria Isolates From Any Culture (See EPIC Reports for Culture Details):  Recent Labs   Lab Test 05/28/20  1631 05/28/20  1412 09/05/18 2020 09/05/18  1940 08/02/16  1609 07/28/16  0620   CULT No growth No growth No growth No growth Culture negative for acid fast bacilli  Assayed at [facility] [address]    No growth after 4 weeks  Culture negative after 4 weeks  No Legionella species isolated  No anaerobes isolated  No growth Canceled, Test credited  >10 Squamous epithelial cells/low power field indicates oral contamination.   Please recollect.  Called to David Cobb [phone] @ [phone]. [phone]  *     Most Recent TSH, T4 and A1c Labs:  Recent Labs   Lab Test 09/06/18  0602  04/06/16  1252  09/04/15  1040   TSH 2.05   < > 6.32*   < >  --    T4  --   --  0.90   < >  --    A1C  --   --   --   --  5.3    < > = values in this interval not displayed.     Results for orders placed or performed during the hospital encounter of 05/18/20   CTA Abdomen Pelvis with Contrast     Value    Radiologist flags Active extravasation (AA)    Narrative    CTA ABDOMEN AND PELVIS WITH CONTRAST   5/18/2020 8:12 PM     HISTORY: Rupture of artery. Post angiography with femoral approach,  now large hematoma formation.    TECHNIQUE:  82mL Isovue-[id number]. CTA images were obtained before and after  contrast administration.Radiation dose for this scan was reduced  using  automated exposure control, adjustment of the mA and/or kV according  to patient size, or iterative reconstruction technique. Multi planar  And Three-D reformatted images were created on a separate workstation.    COMPARISON: April 2, 2020    FINDINGS: There is active extravasation from probably the distal  external iliac artery vs. very proximal common femoral artery into the  superficial tissues of the left groin, as well as into the inguinal  canal which extends into the scrotum. The scrotum is markedly  distended through the hematoma. No significant intra-abdominal  extension. There are extensive atherosclerotic changes of the  visualized aorta and its branches. There is no evidence of aortic  dissection or aneurysm. There are no dilated loops of small intestine  or large bowel to suggest ileus or obstruction. The liver, spleen,  adrenal glands, kidneys, and pancreas demonstrate no worrisome focal  lesion.       Impression    IMPRESSION: Active extravasation from the left common femoral artery  into the left groin and extensive hemorrhage into the scrotum. The  center of the compression device is approximately 4 cm caudal and 3 cm  lateral to the area of active extravasation.    [Critical Result: Active extravasation]    Finding was identified on 5/18/2020 8:14 PM.     Dr. Diez was contacted by me at 5/18/2020 8:32 PM and  verbalized understanding of the critical finding.     MARY WESLEY MD   US Low Extremity Arterial Duplex Left Port    Narrative    EXAM: US LOWER EXTREMITY ARTERIAL DUPLEX LEFT PORTABLE  LOCATION: [facility]  DATE/TIME: 5/18/2020 10:04 PM    INDICATION: Status post angiogram. Pain. Rule out bleeding.  COMPARISON: None.  TECHNIQUE: Duplex utilizing 2D gray-scale imaging, Doppler interrogation with color-flow and spectral waveform analysis.    1.  Findings: Ultrasound examination of the left groin shows no evidence for pseudoaneurysm or active bleeding. Ill-defined  hypoechoic region in the left groin measuring 7.5 x 4.6 cm probably ill-defined hematoma. The underlying common femoral artery is   patent.   XR Chest Port 1 View    Narrative    EXAM: XR CHEST PORT 1 VW  LOCATION: [facility]  DATE/TIME: 5/19/2020 6:10 AM    INDICATION: Central line placement.  COMPARISON: None.      Impression    IMPRESSION: Right-sided central line with tip at the cavoatrial junction. No focal airspace disease or pneumothorax. Emphysema. Right upper lung scarring. Normal heart size.            US Lower Extremity Arterial Duplex Left    Narrative    US LOWER EXTREMITY ARTERIAL DUPLEX LEFT   5/19/2020 3:31 PM     HISTORY: Hematoma 2/2 catheterization, with potential vascular  compromise. Follow up imaging per vascular surgery.    COMPARISON: Ultrasound 5/18/2020, CT 5/18/2020.     FINDINGS: Color Doppler and spectral waveform analysis was performed  throughout the left groin. The left common femoral, superficial  femoral and profunda femoral arteries are patent. No evidence of  pseudoaneurysm or active bleed. There is a large hematoma in the left  groin measuring 7.2 x 6.7 x 2.7 cm.      Impression    IMPRESSION:   1. No pseudoaneurysm or active bleeding.  2. Hematoma in the left groin measuring 7.2 x 6.7 x 2.7 cm.    HAY TEJEDA DO   US Lower Extremity Arterial Duplex Left    Narrative    ULTRASOUND LEFT LOWER EXTREMITY ARTERIAL DUPLEX May 21, 2020 12:15 PM     HISTORY: 74-year-old with worsening left groin hematoma.    COMPARISON: Ultrasound dated 5/19/2020.    FINDINGS: Color Doppler and spectral waveform analysis performed.    The left external iliac artery, common femoral artery, proximal  superficial femoral and proximal profunda femoris arteries are patent.  No evidence for pseudoaneurysm. Again noted is a probable hematoma in  the left inguinal region that measures 4.2 x 5.7 x 2.2 cm. This is not  significantly changed when compared to the previous exam.       Impression     IMPRESSION: No definite evidence for pseudoaneurysm in the left  inguinal region.    OMAIRA WILKINS MD   CT Pelvis Angio w/o & w Contrast    Narrative    CTA ANGIOGRAM PELVIS  5/27/2020 10:28 AM     HISTORY: Reassess hematoma post coronary artery angiogram.    COMPARISON: CT angiogram dated 5/18/2020    TECHNIQUE: CT angiogram of the pelvis was performed following the  administration of 80 mL intravenous contrast. Images are reviewed in  multiple planes and 3-D reconstructions were also performed. Radiation  dose for this scan was reduced using automated exposure control,  adjustment of the mA and/or kV according to patient size, or iterative  reconstruction technique.    FINDINGS:   Vascular examination: Previously seen left femoral pseudoaneurysm has  resolved. There is a residual hematoma in the left inguinal region  extending into the left scrotum. This has decreased in size. The  inguinal component measures approximately 5.6 x 3.8 cm in maximum  craniocaudad and transverse dimensions. The scrotal component measures  approximately 11.6 x 5.3 cm in maximum craniocaudal and transverse  dimensions.    Again identified is scattered atherosclerotic plaque in the abdominal  aorta iliac arteries and proximal femoral arteries. The proximal/mid  right superficial femoral artery is occluded.    There is bladder wall thickening and small bladder diverticuli.      Impression    IMPRESSION: Interval resolution of the previously seen left femoral  pseudoaneurysm. Residual hematomas in the left inguinal region and  scrotum are decreased in size.    OMAIRA WILKINS MD   XR Chest Port 1 View    Narrative    XR CHEST PORT 1 VW 5/29/2020 8:00 AM    HISTORY: leukocytosis, hypotensive    COMPARISON: 5/19/2020      Impression    IMPRESSION: New bilateral interstitial and airspace disease, with  probable bibasilar infiltrates or atelectasis. This suggests infection  or edema. No pneumothorax. Unchanged cardiac size. Mitral  annular  calcifications. Right internal jugular central line with the tip at  the expected location of the right atrium.    SAMEER BHATT MD   CT Head w/o Contrast    Narrative    CT OF THE HEAD WITHOUT CONTRAST 5/29/2020 11:31 AM     COMPARISON: Head CT 9/6/2018.    HISTORY: Altered level of consciousness (LOC), unexplained.    TECHNIQUE: 5 mm thick axial CT images of the head were acquired  without IV contrast material.    FINDINGS:  There is moderate diffuse cerebral volume loss. There are  subtle patchy areas of decreased density in the cerebral white matter  bilaterally that are consistent with sequela of chronic small vessel  ischemic disease.     The ventricles and basal cisterns are within normal limits in  configuration given the degree of cerebral volume loss.  There is no  midline shift. There are no extra-axial fluid collections.     No intracranial hemorrhage, mass or recent infarct.    The visualized paranasal sinuses are well-aerated. There is no  mastoiditis. There are no fractures of the visualized bones. Old left  frontal tim hole is again noted.      Impression    IMPRESSION:  Diffuse cerebral volume loss and cerebral white matter  changes consistent with chronic small vessel ischemic disease. No  evidence for acute intracranial pathology.      Radiation dose for this scan was reduced using automated exposure  control, adjustment of the mA and/or kV according to patient size, or  iterative reconstruction technique.    PRACHI VILLAR MD   Us Post Vascular Access Low Ext Duplex    Narrative    ULTRASOUND POST VASCULAR ACCESS LOWER EXTREMITY DUPLEX  5/29/2020  11:19 AM     HISTORY:  Patient has a left groin hematoma following femoral  catheterization. Evaluate for recurrent pseudoaneurysm.    COMPARISON: CT of the pelvis dated 5/27/2020.    FINDINGS: Color Doppler and spectral waveform analysis performed.    There is no evidence for pseudoaneurysm arising from the left common  femoral artery.  The left common femoral artery, proximal superficial  femoral artery, proximal profunda femoris artery, and distal external  iliac artery are patent with multiphasic waveforms.    There is a hematoma in the left groin that measures 7.0 x 10.0 x 3.2  cm.      Impression    IMPRESSION: No recurrent pseudoaneurysm. Residual hematoma in the left  groin.    OMAIRA WILKINS MD   XR Video Swallow with SLP or OT    Narrative    VIDEO SWALLOW WITH SLP OR OT 5/31/2020 10:13 AM    HISTORY: 74-year-old patient with history of aspiration and dysphagia.    COMPARISON: September 7, 2018.    TECHNIQUE: Patient was brought to the radiology department and lateral  radiographs obtained overlying the neck. Today's exam was performed  with [place] of speech pathology. Patient has previous history of vocal  cord cancer remotely and coronary arterial disease.    A total of 10 fluoroscopic sequences obtained throughout the  procedure. Prior to administering contrast, densities noted in the  posterior soft tissues of the hypopharynx overlying C4-C5, present on  previous exam to variable degree. Patient was given thin barium with  cup and spoon with approximately 50% of swallows demonstrating minimal  penetration, though otherwise unremarkable. This was predominantly  with cup administration and with chin tuck. Nectar-thick consistency  was administered demonstrating tiny flash penetration with spoon,  though unremarkable with cup administration. Pooling was noted in the  valleculae as well as residual. Pudding was administered also  demonstrating pooling, and residual, otherwise unremarkable.  Similarly, semisolid material demonstrated pooling and residual,  though without penetration or aspiration. Total fluoroscopic time is  2.2 minutes.    Air kerma is 3.7 mGy.      Impression    IMPRESSION:  1. Overall, no aspiration. Trace amounts of penetration noted with  thin barium.  2. Relatively consistent throughout the exam was pooling in  the  valleculae and residual and multiple consistencies.    This study includes only the cervical esophagus.    JAMIL DE LA CRUZ MD   XR Chest Port 1 View    Narrative    XR CHEST PORT 1 VW 2020 10:15 AM    HISTORY: shortness of breath    COMPARISON: 2020      Impression    IMPRESSION: Improvement in previously seen bilateral airspace disease.  There is still pulmonary venous congestion and focal infiltrate in the  right costophrenic angle. No pneumothorax. Mild decrease in the  cardiopericardial silhouette size. There are mitral annular  calcifications. Right internal jugular central line with tip at the  expected location of the cavoatrial junction.    SAMEER BHATT MD   Echocardiogram Limited    Narrative    [id number]  [id number]  [id number]  [id number]^CHRISTEL^ALEJANDRA^[facility]  Echocardiography Laboratory  [address] [place] [address]        Name: PHANI AVITIA  MRN: [id number]  : 1945  Study Date: 2020 10:49 AM  Age: 74 yrs  Gender: Male  Patient Location: [facility]  Reason For Study: Aortic Valve Disorder  Ordering Physician: ALEJANDRA KEARNEY  Referring Physician: Jamil Paige  Performed By: Mary Kate Nuñez     BSA: 1.9 m2  Height: 67 in  Weight: 167 lb  HR: 108  BP: 123/77 mmHg  _____________________________________________________________________________  __        Procedure  Limited Echo Adult. Optison (NDC #[id number]) given intravenously.  _____________________________________________________________________________  __        Interpretation Summary     1. The left ventricle is normal in structure, function and size. The visual  ejection fraction is estimated at 60%.  2. The right ventricle is normal size. Mildly decreased right ventricular  systolic function  3. There is moderate to severe mitral annular calcification. The mitral valve  leaflets appear thickened, but open well. There is mild to moderate mitral  stenosis. Mean 5-6mmHg.  4.  Moderate valvular aortic stenosis. Mean 22mmHg, Vmax 3.2m/s, SEBASTIAN 1.3cm, DI  0.39. Visually valve appears more in the severe range, suspect gradient is  underestimated.  5. There is moderate (2+) tricuspid regurgitation.  6. Dilation of the inferior vena cava is present with abnormal respiratory  variation in diameter.     Echo from 2-10-20 showed EF 50-55%, mild RV hypokinesis, MS with mean 6-7mmHg,  AS with mean 32mmHg, Vmax 3.3m/s, 3-4+ TR.     _____________________________________________________________________________  __        Left Ventricle  The left ventricle is normal in structure, function and size. The visual  ejection fraction is estimated at 60%. Normal left ventricular wall motion.     Right Ventricle  The right ventricle is normal size. Mildly decreased right ventricular  systolic function.     Mitral Valve  There is moderate to severe mitral annular calcification. The mitral valve  leaflets appear thickened, but open well. There is mild (1+) mitral  regurgitation. There is mild to moderate mitral stenosis. Mean 5-6mmHg.     Tricuspid Valve  The right ventricular systolic pressure is approximated at 36.6 mmHg plus the  right atrial pressure. There is moderate (2+) tricuspid regurgitation.        Aortic Valve  No aortic regurgitation is present. Moderate valvular aortic stenosis. Mean  22mmHg, Vmax 3.2m/s, SEBASTIAN 1.3cm, DI 0.39.     Vessels  Dilation of the inferior vena cava is present with abnormal respiratory  variation in diameter.     Pericardium  There is no pericardial effusion.     Rhythm  The rhythm was undetermined.     _____________________________________________________________________________  __  MMode/2D Measurements & Calculations  LVOT diam: 2.2 cm  LVOT area: 3.7 cm2        Doppler Measurements & Calculations  MV E max cassandra: 147.8 cm/sec  MV max P.4 mmHg  MV mean P.3 mmHg  MV V2 VTI: 37.8 cm  MVA(VTI): 1.9 cm2  MV dec time: 0.19 sec  Ao V2 max: 319.0 cm/sec  Ao max P.0  mmHg  Ao V2 mean: 221.0 cm/sec  Ao mean P.8 mmHg  Ao V2 VTI: 53.5 cm  SEBASTIAN(I,D): 1.3 cm2  SEBASTIAN(V,D): 1.4 cm2     LV V1 max P.2 mmHg  LV V1 max: 124.4 cm/sec  LV V1 VTI: 19.5 cm  SV(LVOT): 72.0 ml  SI(LVOT): 38.5 ml/m2  TR max lex: 302.7 cm/sec  TR max P.6 mmHg  AV Lex Ratio (DI): 0.39  SEBASTIAN Index (cm2/m2): 0.72  E/E' av.1  Lateral E/e': 13.4  Medial E/e': 14.8           _____________________________________________________________________________  __           Report approved by: Funmilayo Bass 2020 12:10 PM      Cardiac Catheterization    Narrative    1.  Successful complex PCI with adjunctive rotational atherectomy with   drug-eluting stents x4 from proximal to distal RCA  2.  Temporary pacemaker wire removed at end of case

## 2020-06-04 NOTE — PLAN OF CARE
Discharge Planner OT   Patient plan for discharge: agreeable to TCU  Current status: See VS flowsheet for vitals but pt hypotensive and HR tachy w/ activity, in 120's-130s for majority of activity but did spike to 150 and 160 briefly. Mod I w/ bed mob. CGA for transfers and ambulation to/from restroom using 2WW, had 1 LOB and needed min A to regain balance. CGA for toileting. Plan was to complete g/h at sink following toileting, however pt too fatigued after.   Barriers to return to prior living situation: level of A, fall risk, impaired act tolerance, medical status  Recommendations for discharge: TCU  Rationale for recommendations: Pt would benefit from continued OT and PT at TCU setting to maximize (I), safety and activity tolerance for eventual discharge home.       Entered by: Joan Craft 06/04/2020 9:03 AM    Occupational Therapy Discharge Summary    Reason for therapy discharge:    Discharged to transitional care facility.    Progress towards therapy goal(s). See goals on Care Plan in [facility] electronic health record for goal details.  Goals not met.  Barriers to achieving goals:   discharge from facility.    Therapy recommendation(s):    Continued therapy is recommended.  Rationale/Recommendations:  at TCU to improve ind/safety w/ ADL's and IADL's.

## 2020-06-05 ENCOUNTER — PATIENT OUTREACH (OUTPATIENT)
Dept: CARE COORDINATION | Facility: CLINIC | Age: 75
End: 2020-06-05

## 2020-06-05 ENCOUNTER — NURSING HOME VISIT (OUTPATIENT)
Dept: GERIATRICS | Facility: CLINIC | Age: 75
End: 2020-06-05
Payer: MEDICARE

## 2020-06-05 VITALS
HEART RATE: 98 BPM | TEMPERATURE: 98.3 F | SYSTOLIC BLOOD PRESSURE: 118 MMHG | WEIGHT: 155 LBS | RESPIRATION RATE: 20 BRPM | HEIGHT: 67 IN | DIASTOLIC BLOOD PRESSURE: 66 MMHG | BODY MASS INDEX: 24.33 KG/M2 | OXYGEN SATURATION: 95 %

## 2020-06-05 DIAGNOSIS — G47.33 OSA (OBSTRUCTIVE SLEEP APNEA): ICD-10-CM

## 2020-06-05 DIAGNOSIS — T14.8XXA HEMATOMA: ICD-10-CM

## 2020-06-05 DIAGNOSIS — I48.20 CHRONIC A-FIB (H): ICD-10-CM

## 2020-06-05 DIAGNOSIS — J18.9 PNEUMONIA OF BOTH LOWER LOBES DUE TO INFECTIOUS ORGANISM: ICD-10-CM

## 2020-06-05 DIAGNOSIS — R33.9 URINARY RETENTION: ICD-10-CM

## 2020-06-05 DIAGNOSIS — I10 BENIGN ESSENTIAL HYPERTENSION: ICD-10-CM

## 2020-06-05 DIAGNOSIS — R13.12 OROPHARYNGEAL DYSPHAGIA: ICD-10-CM

## 2020-06-05 DIAGNOSIS — Z71.89 ADVANCED DIRECTIVES, COUNSELING/DISCUSSION: ICD-10-CM

## 2020-06-05 DIAGNOSIS — K52.831 COLLAGENOUS COLITIS: ICD-10-CM

## 2020-06-05 DIAGNOSIS — D62 ANEMIA DUE TO BLOOD LOSS, ACUTE: Primary | ICD-10-CM

## 2020-06-05 DIAGNOSIS — I35.0 NONRHEUMATIC AORTIC VALVE STENOSIS: ICD-10-CM

## 2020-06-05 DIAGNOSIS — S30.1XXA HEMATOMA OF GROIN: Primary | ICD-10-CM

## 2020-06-05 DIAGNOSIS — R57.8 HEMORRHAGIC SHOCK (H): ICD-10-CM

## 2020-06-05 DIAGNOSIS — I25.10 CORONARY ARTERY DISEASE INVOLVING NATIVE CORONARY ARTERY OF NATIVE HEART WITHOUT ANGINA PECTORIS: ICD-10-CM

## 2020-06-05 DIAGNOSIS — R53.81 PHYSICAL DECONDITIONING: ICD-10-CM

## 2020-06-05 PROCEDURE — 99309 SBSQ NF CARE MODERATE MDM 30: CPT | Performed by: NURSE PRACTITIONER

## 2020-06-05 ASSESSMENT — MIFFLIN-ST. JEOR: SCORE: 1391.71

## 2020-06-05 NOTE — PROGRESS NOTES
[facility]  PRIMARY CARE PROVIDER AND CLINIC:  Danny Paige MD, MD, [address] / [place] [address]  Chief Complaint   Patient presents with     Hospital F/U     [facility] Record Number: [id number]  Place of Service where encounter took place: [facility] [[id number]]    Efrem Ramos is a 74 year old (1945), admitted to the above facility from  [facility]. Hospital stay 05/18/20 through 06/04/20. Admitted to this facility for rehab, medical management and nursing care.    HPI:    HPI information obtained from: facility chart records, facility staff, patient report and [facility] chart review.   Brief Summary of Hospital Course:   He has a medical history significant for CAD, chronic afib,  severe aortic stenosis, mild to moderate mitral stenosis, PVD,  HTN, MEL on CPAP, hyperlipidemia, colitis, CVA 2015, and was admitted 5/18/2020 due to hemorrhagic shock secondary to cath site bleeding and a massive hematoma of his left groin post elective  right coronary artery stenting X 4, in anticipation of TAVR. Eliquis had been held 3 days prior to the procedure. CTA abdomen/pelvis showed active extravasation from the left common femoral artery into the left groin with extensive hemorrhage. Vascular Surgery was consulted and hemostasis was achieved with direct US guided pressure. He required pressor support and received a total of 4 units of PRBC. IV iron was given X 2. Hgb stabilized in the mid 8s. Eliquis is on hold pending Cardiology follow up. ASA/Plavix continued given recent PCI. Follow up US 5/29/2020 showed no recurrent pseudoaneurysm, residual left groin hematoma was noted.   Urology consulted for scrotal edema and recommended support and elevation. Tamsulosin was started for urinary retention with high PVRs.   Emergent cardiac cath was done 5/28/2020 due to acute hypotension, leukocytosis, tachycardia and elevated lactate  "of 3.3. Final report is pending. Metoprolol was decreased and digoxin was started. He had transient tachycardia in the 120s with ambulation, HR improved at rest. SBP intermittently dropped to the 80-90s, but he remained asymptomatic.   CXR 5/29/2020 showed new bibasilar interstitial infiltrates suggesting infection or edema. Given elevated lactate and WBC 12.4, he was treated with vancomycin and ceftriaxone. Discharged on levaquin for 3 days. Blood cultures no growth. CT head was done 5/29/2020 for altered level of consciousness and showed diffuse cerebral volume loss and changes consistent with small vessel ischemic disease, no acute pathology.   SPEECH THERAPY evaluated for dysphagia and recommended DD2 with thin liquids.   At discharge: WBC 12.0, Hgb 8.8, creatinine 0.91.       Updates on Status Since Skilled nursing Admission:   Feels \"not so hot.\" Fatigued with poor activity tolerance. Reports pain of his groin is slowly improving. Appetite is poor. Denies trouble chewing or swallowing. Denies GI symptoms. Reports shortness of breath with exertion, no cough or chest pain.     CODE STATUS/ADVANCE DIRECTIVES DISCUSSION: CPR/Full code   Patient's living condition: lives with spouse at their home   ALLERGIES: Sulfa drugs; Adhesive tape; Amiodarone; and Penicillins  PAST MEDICAL HISTORY:  has a past medical history of Atrial fibrillation (H), Benign essential hypertension (11/20/2018), Cancer (H) (vocal cord), Carpal tunnel syndrome, CVA (cerebral infarction) (5/5/2015), Demyelinating disease of central nervous system, unspecified (H), Dyspnea, ENCEPHALOPATHY UNSPECIFIED  (3/15/2005), Mitral valve problem (8/18/2013), Mixed hyperlipidemia (3/15/2005), Other and unspecified noninfectious gastroenteritis and colitis(558.9), Pneumonia (8/17/2016), PVD (peripheral vascular disease) (H), Redundant colon, S/P coronary angiogram (09/05/2017), Shingles, SKIN DISORDERS NEC (3/15/2005), and Sleep apnea. He also has no past " medical history of Arthritis, Asthma, Congestive heart failure, unspecified, Diabetes mellitus (H), or Thyroid disease.  PAST SURGICAL HISTORY:   has a past surgical history that includes NONSPECIFIC PROCEDURE (as a child); NONSPECIFIC PROCEDURE (early); Head and neck surgery; biopsy (brain 2002); Thoracoscopic wedge resection lung (Right, 8/2/2016); Bone marrow biopsy, bone specimen, needle/trocar (N/A, 6/8/2017); orthopedic surgery; Esophagoscopy, gastroscopy, duodenoscopy (EGD), combined (N/A, 9/8/2018); cardiac catherization (09/05/2017); Coronary Angiogram (N/A, 3/23/2020); Instantaneous Wave-Free Ratio (N/A, 3/23/2020); Heart Catheterization with Possible Intervention (N/A, 5/18/2020); Temporary Pacemaker Insertion (N/A, 5/18/2020); Percutaneous Coronary Intervention Stent Drug Eluting (N/A, 5/18/2020); and Percutaneous Coronary Intervention Atherectomy Orbital (N/A, 5/18/2020).  FAMILY HISTORY: family history includes Blood Disease in his mother; Cancer - colorectal in his maternal grandfather; Cardiovascular in his father; Diabetes (age of onset: 5) in his sister; Hypertension in his brother; Respiratory in an other family member.  SOCIAL HISTORY:   reports that he quit smoking about 6 years ago. His smoking use included cigarettes. He has a 30.00 pack-year smoking history. He has never used smokeless tobacco. He reports current alcohol use of about 42.0 standard drinks of alcohol per week. He reports that he does not use drugs.    Post Discharge Medication Reconciliation Status: discharge medications reconciled and changed, per note/orders (see AVS)    Current Outpatient Medications   Medication Sig Dispense Refill     acetaminophen (TYLENOL) 325 MG tablet Take 650 mg by mouth every 4 hours as needed for mild pain        aspirin (ASA) 81 MG EC tablet Take 1 tablet (81 mg) by mouth daily Start tomorrow morning. 90 tablet 3     atorvastatin (LIPITOR) 40 MG tablet Take 1 tablet (40 mg) by mouth daily 90 tablet  "3     calcium carbonate 600 mg-vitamin D 400 units (CALTRATE) 600-400 MG-UNIT per tablet Take 1 tablet by mouth 2 times daily       clopidogrel (PLAVIX) 75 MG tablet Take 1 tablet (75 mg) by mouth daily 90 tablet 3     Cyanocobalamin 2000 MCG TABS Take 2,000 mcg by mouth every 7 days On Sundays       digoxin (LANOXIN) 250 MCG tablet Take 1 tablet (250 mcg) by mouth daily 30 tablet 0     gabapentin (NEURONTIN) 300 MG capsule Take 2 capsules (600 mg) by mouth every evening 60 capsule 5     levofloxacin (LEVAQUIN) 750 MG tablet Take 1 tablet (750 mg) by mouth daily 3 tablet 0     loperamide (IMODIUM) 2 MG capsule Take 2 mg by mouth 4 times daily as needed for diarrhea       melatonin 1 MG TABS tablet Take 3 mg by mouth nightly as needed for sleep        metoprolol tartrate (LOPRESSOR) 25 MG tablet Take 0.5 tablets (12.5 mg) by mouth 2 times daily Hold for SBP<100 or HR<60 60 tablet 0     multivitamin (OCUVITE) TABS Take 1 tablet by mouth 2 times daily        nitroGLYcerin (NITROSTAT) 0.4 MG sublingual tablet For chest pain place 1 tablet under the tongue every 5 minutes for 3 doses. If symptoms persist 5 minutes after 1st dose call 911. 20 tablet 0     oxyCODONE (ROXICODONE) 5 MG tablet Take 0.5 tablets (2.5 mg) by mouth every 8 hours as needed for moderate to severe pain 15 tablet 0     polyethylene glycol (MIRALAX/GLYCOLAX) powder Take 1 capful by mouth daily as needed for constipation       potassium chloride ER 20 MEQ PO CR tablet Take 1 tablet (20 mEq) by mouth daily 90 tablet 3     Skin Protectants, Misc. (EUCERIN) cream Apply topically every hour as needed for dry skin 120 g 0         ROS:  10 point ROS of systems including Constitutional, Eyes, Respiratory, Cardiovascular, Gastroenterology, Genitourinary, Integumentary, Musculoskeletal, Psychiatric were all negative except for pertinent positives noted in my HPI.    Vitals:  /66   Pulse 98   Temp 98.3  F (36.8  C)   Resp 20   Ht 1.702 m (5' 7\")   Wt " 70.3 kg (155 lb)   SpO2 95%   BMI 24.28 kg/m    Limited exam due to COVID-19 precautions   GENERAL APPEARANCE:  Alert, in no distress  ENT:  [place]  EYES:  EOM normal, conjunctiva and lids normal  RESP:  no respiratory distress  CV:  peripheral edema trace in both LE  M/S:   in bed. DAMIAN with good strength  SKIN: hematoma across groin,  lower abdomen, scrotum and upper thighs. No visible open areas or rashes. Mild scrotal edema.   PSYCH:  oriented X 3, memory impaired , affect and mood normal    Lab/Diagnostic data:  Recent labs in [facility] reviewed by me today.     ASSESSMENT/PLAN:  (D62) Anemia due to blood loss, acute  (primary encounter diagnosis)  (T14.8XXA) Hematoma  (R57.8) Hemorrhagic shock (H)  Comment: post cardiac catheterization, he developed acute bleeding from his left femoral access site with subsequent hemorrhagic shock. Received a total of 4 units PRBC and IV iron X 2. Hgb 8.8 on 6/4/2020. Hematoma slowly improving, pain is controlled. Eliquis is on hold.   Plan: continue tylenol and oxycodone. CBC 6/8/2020.    (I25.10) Coronary artery disease involving native coronary artery of native heart without angina pectoris   (I35.0) Nonrheumatic aortic valve stenosis  Comment: s/p complex PCI with rotational atherectomy and 4 stents to the proximal to distal RCA on 5/18/2020. Awaiting TAVR after he recuperates.   Plan: continue ASA, Plavix and metoprolol. Cardiology follow up 6/11/2020.     (I48.20) Chronic a-fib  Comment: he had episodes of afib with RVR with activity. Emergent cardiac cath was done 5/28/2020 and final report is not available. Metoprolol was decreased due to hypotension and digoxin was started.  Eliquis remains on hold secondary to massive hematoma.   Recent HR: 81-98   Plan: continue metoprolol and digoxin.  Monitor HR. Hold Eliquis pending Cardiology follow up 6/11/2020.     (J18.1) Pneumonia of both lower lobes due to infectious organism (H)  Comment: CXR 5/29/2020 concerning for pneumonia vs  edema. Given elevated lactate and WBC, he was treated with vancomycin and ceftriaxone. Respiratory status appears to be improving, no hypoxia. Afebrile.   Plan: complete 3 day course of levaquin. Monitor respiratory status, VS. CBC    (R13.12) Oropharyngeal dysphagia  Comment: video swallow done 5/31/2020 showed trace penetration with thin barium, no aspiration. Tolerating DD2 with thin liquids   Plan: SPEECH THERAPY eval and treat, advance diet as tolerated.     (I10) Benign essential hypertension  Comment: controlled with BPs: 118/66, 125/82, 114/71  Plan: continue metoprolol. Monitor VS. Avoid hypotension due to multiple comorbidities, advanced age and fall risk.     Urinary retention  Comment: tamsulosin was started while inpatient for significant PVRs. PVR today is 260. His poor mobility is likely contributing. Scrotal edema is improving.   Plan: continue tamsulosin. Bladder scan for PVR.  Urology follow up in 4 weeks.     (K52.831) Collagenous colitis  Comment: chronic with no acute symptoms   Plan: miralax and imodium prn     (G47.33) MEL (obstructive sleep apnea)  Comment: reports he has not used his CPAP for the past few months and is not sure if a family member will be able to bring it to the facility   Plan: closely monitor respiratory status and sleep. He will check with family re: bringing in his CPAP.     (R53.81) Physical deconditioning  Comment: due to acute illness, prolonged hospitalization and multiple comorbidities   Plan: PHYSICAL THERAPY/OT. Goal is to return home with his wife and home care services. They are interested in possibly moving to [place].  will schedule care conference.     (Z71.89) Advanced directives, counseling/discussion  Comment: he requests Full Code   Plan: POLST completed and orders updated             Electronically signed by:  FABI Fuentes CNP

## 2020-06-05 NOTE — LETTER
6/5/2020        RE: Efrem Ramos  [address] [place] [address]        [facility]  PRIMARY CARE PROVIDER AND CLINIC:  Danny Paige MD, MD, [address] [place] [address] / [place] [address]  Chief Complaint   Patient presents with     Hospital F/U     [facility] Medical Record Number: [id number]  Place of Service where encounter took place: [facility] [[id number]]    Efrem Ramos is a 74 year old (1945), admitted to the above facility from  [facility]. Hospital stay 05/18/20 through 06/04/20. Admitted to this facility for rehab, medical management and nursing care.    HPI:    HPI information obtained from: facility chart records, facility staff, patient report and [facility] chart review.   Brief Summary of Hospital Course:   He has a medical history significant for CAD, chronic afib,  severe aortic stenosis, mild to moderate mitral stenosis, PVD,  HTN, MEL on CPAP, hyperlipidemia, colitis, CVA 2015, and was admitted 5/18/2020 due to hemorrhagic shock secondary to cath site bleeding and a massive hematoma of his left groin post elective  right coronary artery stenting X 4, in anticipation of TAVR. Eliquis had been held 3 days prior to the procedure. CTA abdomen/pelvis showed active extravasation from the left common femoral artery into the left groin with extensive hemorrhage. Vascular Surgery was consulted and hemostasis was achieved with direct US guided pressure. He required pressor support and received a total of 4 units of PRBC. IV iron was given X 2. Hgb stabilized in the mid 8s. Eliquis is on hold pending Cardiology follow up. ASA/Plavix continued given recent PCI. Follow up US 5/29/2020 showed no recurrent pseudoaneurysm, residual left groin hematoma was noted.   Urology consulted for scrotal edema and recommended support and elevation. Tamsulosin was started for urinary retention with high PVRs.   Emergent cardiac cath  "was done 5/28/2020 due to acute hypotension, leukocytosis, tachycardia and elevated lactate of 3.3. Final report is pending. Metoprolol was decreased and digoxin was started. He had transient tachycardia in the 120s with ambulation, HR improved at rest. SBP intermittently dropped to the 80-90s, but he remained asymptomatic.   CXR 5/29/2020 showed new bibasilar interstitial infiltrates suggesting infection or edema. Given elevated lactate and WBC 12.4, he was treated with vancomycin and ceftriaxone. Discharged on levaquin for 3 days. Blood cultures no growth. CT head was done 5/29/2020 for altered level of consciousness and showed diffuse cerebral volume loss and changes consistent with small vessel ischemic disease, no acute pathology.   SPEECH THERAPY evaluated for dysphagia and recommended DD2 with thin liquids.   At discharge: WBC 12.0, Hgb 8.8, creatinine 0.91.       Updates on Status Since Skilled nursing Admission:   Feels \"not so hot.\" Fatigued with poor activity tolerance. Reports pain of his groin is slowly improving. Appetite is poor. Denies trouble chewing or swallowing. Denies GI symptoms. Reports shortness of breath with exertion, no cough or chest pain.     CODE STATUS/ADVANCE DIRECTIVES DISCUSSION: CPR/Full code   Patient's living condition: lives with spouse at their home   ALLERGIES: Sulfa drugs; Adhesive tape; Amiodarone; and Penicillins  PAST MEDICAL HISTORY:  has a past medical history of Atrial fibrillation (H), Benign essential hypertension (11/20/2018), Cancer (H) (vocal cord), Carpal tunnel syndrome, CVA (cerebral infarction) (5/5/2015), Demyelinating disease of central nervous system, unspecified (H), Dyspnea, ENCEPHALOPATHY UNSPECIFIED  (3/15/2005), Mitral valve problem (8/18/2013), Mixed hyperlipidemia (3/15/2005), Other and unspecified noninfectious gastroenteritis and colitis(558.9), Pneumonia (8/17/2016), PVD (peripheral vascular disease) (H), Redundant colon, S/P coronary angiogram " (09/05/2017), Shingles, SKIN DISORDERS NEC (3/15/2005), and Sleep apnea. He also has no past medical history of Arthritis, Asthma, Congestive heart failure, unspecified, Diabetes mellitus (H), or Thyroid disease.  PAST SURGICAL HISTORY:   has a past surgical history that includes NONSPECIFIC PROCEDURE (as a child); NONSPECIFIC PROCEDURE (early); Head and neck surgery; biopsy (brain 2002); Thoracoscopic wedge resection lung (Right, 8/2/2016); Bone marrow biopsy, bone specimen, needle/trocar (N/A, 6/8/2017); orthopedic surgery; Esophagoscopy, gastroscopy, duodenoscopy (EGD), combined (N/A, 9/8/2018); cardiac catherization (09/05/2017); Coronary Angiogram (N/A, 3/23/2020); Instantaneous Wave-Free Ratio (N/A, 3/23/2020); Heart Catheterization with Possible Intervention (N/A, 5/18/2020); Temporary Pacemaker Insertion (N/A, 5/18/2020); Percutaneous Coronary Intervention Stent Drug Eluting (N/A, 5/18/2020); and Percutaneous Coronary Intervention Atherectomy Orbital (N/A, 5/18/2020).  FAMILY HISTORY: family history includes Blood Disease in his mother; Cancer - colorectal in his maternal grandfather; Cardiovascular in his father; Diabetes (age of onset: 5) in his sister; Hypertension in his brother; Respiratory in an other family member.  SOCIAL HISTORY:   reports that he quit smoking about 6 years ago. His smoking use included cigarettes. He has a 30.00 pack-year smoking history. He has never used smokeless tobacco. He reports current alcohol use of about 42.0 standard drinks of alcohol per week. He reports that he does not use drugs.    Post Discharge Medication Reconciliation Status: discharge medications reconciled and changed, per note/orders (see AVS)    Current Outpatient Medications   Medication Sig Dispense Refill     acetaminophen (TYLENOL) 325 MG tablet Take 650 mg by mouth every 4 hours as needed for mild pain        aspirin (ASA) 81 MG EC tablet Take 1 tablet (81 mg) by mouth daily Start tomorrow morning. 90  tablet 3     atorvastatin (LIPITOR) 40 MG tablet Take 1 tablet (40 mg) by mouth daily 90 tablet 3     calcium carbonate 600 mg-vitamin D 400 units (CALTRATE) 600-400 MG-UNIT per tablet Take 1 tablet by mouth 2 times daily       clopidogrel (PLAVIX) 75 MG tablet Take 1 tablet (75 mg) by mouth daily 90 tablet 3     Cyanocobalamin 2000 MCG TABS Take 2,000 mcg by mouth every 7 days On Sundays       digoxin (LANOXIN) 250 MCG tablet Take 1 tablet (250 mcg) by mouth daily 30 tablet 0     gabapentin (NEURONTIN) 300 MG capsule Take 2 capsules (600 mg) by mouth every evening 60 capsule 5     levofloxacin (LEVAQUIN) 750 MG tablet Take 1 tablet (750 mg) by mouth daily 3 tablet 0     loperamide (IMODIUM) 2 MG capsule Take 2 mg by mouth 4 times daily as needed for diarrhea       melatonin 1 MG TABS tablet Take 3 mg by mouth nightly as needed for sleep        metoprolol tartrate (LOPRESSOR) 25 MG tablet Take 0.5 tablets (12.5 mg) by mouth 2 times daily Hold for SBP<100 or HR<60 60 tablet 0     multivitamin (OCUVITE) TABS Take 1 tablet by mouth 2 times daily        nitroGLYcerin (NITROSTAT) 0.4 MG sublingual tablet For chest pain place 1 tablet under the tongue every 5 minutes for 3 doses. If symptoms persist 5 minutes after 1st dose call 911. 20 tablet 0     oxyCODONE (ROXICODONE) 5 MG tablet Take 0.5 tablets (2.5 mg) by mouth every 8 hours as needed for moderate to severe pain 15 tablet 0     polyethylene glycol (MIRALAX/GLYCOLAX) powder Take 1 capful by mouth daily as needed for constipation       potassium chloride ER 20 MEQ PO CR tablet Take 1 tablet (20 mEq) by mouth daily 90 tablet 3     Skin Protectants, Misc. (EUCERIN) cream Apply topically every hour as needed for dry skin 120 g 0         ROS:  10 point ROS of systems including Constitutional, Eyes, Respiratory, Cardiovascular, Gastroenterology, Genitourinary, Integumentary, Musculoskeletal, Psychiatric were all negative except for pertinent positives noted in my  "HPI.    Vitals:  /66   Pulse 98   Temp 98.3  F (36.8  C)   Resp 20   Ht 1.702 m (5' 7\")   Wt 70.3 kg (155 lb)   SpO2 95%   BMI 24.28 kg/m    Limited exam due to COVID-19 precautions   GENERAL APPEARANCE:  Alert, in no distress  ENT:  [place]  EYES:  EOM normal, conjunctiva and lids normal  RESP:  no respiratory distress  CV:  peripheral edema trace in both LE  M/S:   in bed. DAMIAN with good strength  SKIN: hematoma across groin,  lower abdomen, scrotum and upper thighs. No visible open areas or rashes. Mild scrotal edema.   PSYCH:  oriented X 3, memory impaired , affect and mood normal    Lab/Diagnostic data:  Recent labs in [facility] reviewed by me today.     ASSESSMENT/PLAN:  (D62) Anemia due to blood loss, acute  (primary encounter diagnosis)  (T14.8XXA) Hematoma  (R57.8) Hemorrhagic shock (H)  Comment: post cardiac catheterization, he developed acute bleeding from his left femoral access site with subsequent hemorrhagic shock. Received a total of 4 units PRBC and IV iron X 2. Hgb 8.8 on 6/4/2020. Hematoma slowly improving, pain is controlled. Eliquis is on hold.   Plan: continue tylenol and oxycodone. CBC 6/8/2020.    (I25.10) Coronary artery disease involving native coronary artery of native heart without angina pectoris   (I35.0) Nonrheumatic aortic valve stenosis  Comment: s/p complex PCI with rotational atherectomy and 4 stents to the proximal to distal RCA on 5/18/2020. Awaiting TAVR after he recuperates.   Plan: continue ASA, Plavix and metoprolol. Cardiology follow up 6/11/2020.     (I48.20) Chronic a-fib  Comment: he had episodes of afib with RVR with activity. Emergent cardiac cath was done 5/28/2020 and final report is not available. Metoprolol was decreased due to hypotension and digoxin was started.  Eliquis remains on hold secondary to massive hematoma.   Recent HR: 81-98   Plan: continue metoprolol and digoxin.  Monitor HR. Hold Eliquis pending Cardiology follow up 6/11/2020.     (J18.1) " Pneumonia of both lower lobes due to infectious organism (H)  Comment: CXR 5/29/2020 concerning for pneumonia vs edema. Given elevated lactate and WBC, he was treated with vancomycin and ceftriaxone. Respiratory status appears to be improving, no hypoxia. Afebrile.   Plan: complete 3 day course of levaquin. Monitor respiratory status, VS. CBC    (R13.12) Oropharyngeal dysphagia  Comment: video swallow done 5/31/2020 showed trace penetration with thin barium, no aspiration. Tolerating DD2 with thin liquids   Plan: SPEECH THERAPY eval and treat, advance diet as tolerated.     (I10) Benign essential hypertension  Comment: controlled with BPs: 118/66, 125/82, 114/71  Plan: continue metoprolol. Monitor VS. Avoid hypotension due to multiple comorbidities, advanced age and fall risk.     Urinary retention  Comment: tamsulosin was started while inpatient for significant PVRs. PVR today is 260. His poor mobility is likely contributing. Scrotal edema is improving.   Plan: continue tamsulosin. Bladder scan for PVR.  Urology follow up in 4 weeks.     (K52.831) Collagenous colitis  Comment: chronic with no acute symptoms   Plan: miralax and imodium prn     (G47.33) MEL (obstructive sleep apnea)  Comment: reports he has not used his CPAP for the past few months and is not sure if a family member will be able to bring it to the facility   Plan: closely monitor respiratory status and sleep. He will check with family re: bringing in his CPAP.     (R53.81) Physical deconditioning  Comment: due to acute illness, prolonged hospitalization and multiple comorbidities   Plan: PHYSICAL THERAPY/OT. Goal is to return home with his wife and home care services. They are interested in possibly moving to [place].  will schedule care conference.     (Z71.89) Advanced directives, counseling/discussion  Comment: he requests Full Code   Plan: POLST completed and orders updated             Electronically signed by:  FABI Fuentes  CNP         Sincerely,        FABI Fuentes CNP

## 2020-06-05 NOTE — PROGRESS NOTES
Clinic Care Coordination Contact  Care Coordination Transition Communication         Clinical Data:  [facility]     Discharge Summary  Hospitalist     Date of Admission:  5/18/2020  Date of Discharge:  6/4/2020  Discharging Provider: Ramesh Marie MD  Date of Service (when I saw the patient): 06/04/20        Discharge Diagnoses        Postprocedure massive hematoma, left groin/scrotum  Hemorrhagic shock, resolved  Acute blood loss anemia  Chronic atrial fibrillation with RVR  CAD s/p PCIx4 to RCA (5/18/2020)  Hypertension  Hyperlipidemia  Severe aortic stenosis  Sepsis (Leukocytosis, lactic acidosis with hypotension, tachycardia)  Bilateral pulmonary infiltrates concerning for pna vs CHF  MEL   .     Transition to Facility:              Facility Name: [facility]              Contact name and phone number/fax: CC faxed contact information to facility to call back when the patient has been discharged     Plan: RN/SW Care Coordinator will await notification from facility staff informing RN/SW Care Coordinator of patient's discharge plans/needs. RN/SW Care Coordinator will review chart and outreach to facility staff every 4 weeks and as needed.     [facility]     Shannen Lynn RN Care Coordinator   [facility]   Phone: [phone]  Email :  [email or web address]

## 2020-06-05 NOTE — LETTER
To:             Please give to facility    From:   Shannen Lynn RN, Care Coordinator   [facility] -Care Coordination  [facility]   E-mail [email or web address]   [phone]    Patient Name:  Efrem Ramos YOB: 1945   Admit date: 6/4/2020      *Information Needed:  Please contact me when the patient will discharge (or if they will move to long term care)- include the discharge date, disposition, and main diagnosis   - If the patient is discharged with home care services, please provide the name of the agency    Also- Please inform me if a care conference is being held.   Shannen Lynn RN, Care Coordinator   [facility] -Care Coordination  [facility]   E-mail [email or web address]   [phone]                              Thank you

## 2020-06-08 ENCOUNTER — TELEPHONE (OUTPATIENT)
Dept: CARDIOLOGY | Facility: CLINIC | Age: 75
End: 2020-06-08

## 2020-06-08 ENCOUNTER — HOSPITAL LABORATORY (OUTPATIENT)
Dept: OTHER | Facility: CLINIC | Age: 75
End: 2020-06-08

## 2020-06-08 LAB
ANION GAP SERPL CALCULATED.3IONS-SCNC: 9 MMOL/L (ref 3–14)
BUN SERPL-MCNC: 15 MG/DL (ref 7–30)
CALCIUM SERPL-MCNC: 8.9 MG/DL (ref 8.5–10.1)
CHLORIDE SERPL-SCNC: 103 MMOL/L (ref 94–109)
CO2 SERPL-SCNC: 27 MMOL/L (ref 20–32)
CREAT SERPL-MCNC: 1.12 MG/DL (ref 0.66–1.25)
ERYTHROCYTE [DISTWIDTH] IN BLOOD BY AUTOMATED COUNT: 17.1 % (ref 10–15)
GFR SERPL CREATININE-BSD FRML MDRD: 63 ML/MIN/{1.73_M2}
GLUCOSE SERPL-MCNC: 113 MG/DL (ref 70–99)
HCT VFR BLD AUTO: 33.5 % (ref 40–53)
HGB BLD-MCNC: 10.4 G/DL (ref 13.3–17.7)
MCH RBC QN AUTO: 30.8 PG (ref 26.5–33)
MCHC RBC AUTO-ENTMCNC: 31 G/DL (ref 31.5–36.5)
MCV RBC AUTO: 99 FL (ref 78–100)
PLATELET # BLD AUTO: 581 10E9/L (ref 150–450)
POTASSIUM SERPL-SCNC: 3.5 MMOL/L (ref 3.4–5.3)
RBC # BLD AUTO: 3.38 10E12/L (ref 4.4–5.9)
SODIUM SERPL-SCNC: 139 MMOL/L (ref 133–144)
WBC # BLD AUTO: 11.6 10E9/L (ref 4–11)

## 2020-06-08 NOTE — TELEPHONE ENCOUNTER
Patient with a past medical history of obstructive sleep apnea, hyperlipidemia, CVA, A. fib on chronic anticoagulation with Eliquis, coronary artery disease, mitral stenosis, and severe aortic stenosis who was admitted on 5/18/2020 following complications after an elective heart catheterization with stent placement and rotational artherectomy of RCA in anticipation of TAVR. PCI via LFA with ISABEL x 4 to RCA. Post procedure hypotension and massive LFA hematoma and hemorrhagic shock. Vascular surgery achieved hemostasis via US guided direct pressure. Anemia with Hgb dropping to 7.8-received 4 units of PRBC's. Started on Plavix, Digoxin, Metoprolol and PRN SL NTG prior to discharge. [facility] up plan for patient with Care Coordinator. RN confirmed with Care Coordinator that patient has a scheduled video visit appt on 6/11/20 at 1050 with SHAWN Griffith. JB Ruff RN.

## 2020-06-09 VITALS
DIASTOLIC BLOOD PRESSURE: 60 MMHG | OXYGEN SATURATION: 100 % | SYSTOLIC BLOOD PRESSURE: 86 MMHG | HEART RATE: 97 BPM | HEIGHT: 67 IN | BODY MASS INDEX: 23.32 KG/M2 | TEMPERATURE: 98.7 F | RESPIRATION RATE: 18 BRPM | WEIGHT: 148.6 LBS

## 2020-06-09 ASSESSMENT — MIFFLIN-ST. JEOR: SCORE: 1362.68

## 2020-06-09 NOTE — PROGRESS NOTES
"[facility]   Efrem Ramos is being evaluated via a billable video visit due to the restrictions of the Covid-19 pandemic.   The patient has been notified of following:  This video visit will be conducted via a call between you and your provider. We have found that certain health care needs can be provided without the need for an in-person physical exam.  This service lets us provide the care you need with a video conversation. If during the course of the call the provider feels a video visit is not appropriate, you will not be charged for this service.\"   The provider has received verbal consent for a Video Visit from the patient or first contact? Yes  Patient  or facility staff would like the video invitation sent by: Send to e-mail at: [email or web address]  Video Start Time: 10:30 am  [facility] Record Number:  [id number]  Place of Location at the time of visit: [facility]     CHIEF COMPLAINT:  Hospital follow-up/Initial visit    HPI:  Efrem Ramos  is a 74 year old (1945), who is being seen today for a visit.  HPI information obtained from: patient report and [employer] chart review.     Medical history is significant for coronary artery disease, aortic stenosis, mitral stenosis, chronic atrial fibrillation on Eliquis, hypertension, CVA, COPD, obstructive sleep apnea, peripheral vascular disease, dyslipidemia, vocal cord cancer, shingles, pneumonia, collagenous colitis, carpal tunnel syndrome, and demyelinating disease of central nervous system.    He was hospitalized at [facility] from May 18 through June 4, 2020 following complications after an elective cardiac catheterization and placement of multiple stents to RCA.  Postprocedure he was noted to have persistent catheter site bleeding with massive hematoma and associated hemorrhagic shock.  There was active extravasation from the left common femoral artery into the left groin and extensive " hemorrhage into the scrotum.  He was resuscitated with total of 4 units of PRBC and managed initially in ICU.  Vascular surgery was consulted urgently and hemostasis was achieved with direct ultrasound-guided pressure and repositioning of FemoStop.  Urology was consulted for scrotal hematoma, recommending scrotal support.  Patient had uncontrolled A. fib with RVR and therefore started on digoxin.  Metoprolol was started once blood pressure stabilized.  Emergent cardiac cath was done on May 28 due to acute hypotension, leukocytosis, tachycardia, and elevated lactate of 3.3 (report is still pending).  Metoprolol was decreased and Zosyn was restarted.  Chest x-ray on May 29, showed new bibasilar interstitial infiltrates suggesting infection or edema.  Given elevated lactate and mild leukocytosis, he was treated with vancomycin and ceftriaxone.  He was discharged on Levaquin for 3 days.  Blood cultures did not yield any growth.  CT head done on May 29 for altered level of consciousness, and showed diffuse cerebral volume loss and changes consistent with diffuse cerebral volume loss and chronic small vessel ischemic disease but no acute pathology.  SLP evaluation was performed and he was started on dysphagia diet level 2 with thin liquids.    At time of discharge from hospital, hemoglobin was 8.8.  Notably he received IV iron x2 while inpatient.    Patient is admitted to this facility for rehabilitation and continuation of medical management.  He appears comfortable.  He complains of swelling in his scrotum as well as penis which at times interferes with normal urination.  His blood pressure is running low to low normal.  He feels somewhat dizzy upon standing. He reports dyspnea with moderate exertion.  He denies fever, chills, chest pain, palpitation,  nausea, vomiting, or abdominal pain.  He does endorse 3-4 loose/watery BMs a day which is normal for him due to collagenous colitis.      Patient was seen today through a  virtual video visit.     CODE STATUS:   CPR/Full code     Past Medical and Surgical History reviewed in Epic today.    PAST MEDICAL HISTORY:   1 vessel coronary artery disease, status post drug-eluting stents x4  from proximal to distal RCA on May 18, 2020  Hypertension  Dyslipidemia  Chronic atrial fibrillation, on Eliquis  CVA  Peripheral arterial disease (obstructive disease in the right common iliac artery)  Moderate-severe aortic stenosis  Mild to moderate mitral stenosis  Moderate tricuspid regurgitation  COPD  Obstructive sleep apnea, on CPAP  Vocal cord cancer  Shingles  Pneumonia  Carpal tunnel syndrome  Demyelinating disease of central nervous system  Collagenous colitis  Post-cardiac cath left inguinal and scrotal hematoma with hemorrhagic shock, as outlined in HPI    Past Medical History:   Diagnosis Date     Atrial fibrillation (H)      Benign essential hypertension 11/20/2018     Cancer (H) vocal cord     Carpal tunnel syndrome     abstracted 7/3/02.     CVA (cerebral infarction) 5/5/2015     Demyelinating disease of central nervous system, unspecified (H)     abstracted 7/3/02.     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     Pneumonia 8/17/2016     PVD (peripheral vascular disease) (H)      Redundant colon     needs CT colonography     S/P coronary angiogram 09/05/2017     Shingles      SKIN DISORDERS NEC 3/15/2005     Sleep apnea        PAST SURGICAL HISTORY:   Past Surgical History:   Procedure Laterality Date     BIOPSY  brain 2002     BONE MARROW BIOPSY, BONE  SPECIMEN, NEEDLE/TROCAR N/A 6/8/2017    Procedure: BIOPSY BONE MARROW;  UNILATERAL BONE MARROW BIOPSY (CONSCIOUS SEDATION) ;  Surgeon: Jamie Gonzales MD;  Location:  GI     C NONSPECIFIC PROCEDURE  as a child    T & A. abstracted 7/3/02.     C NONSPECIFIC PROCEDURE  early    CTR. abstracted 7/3/02.     CARDIAC CATHERIZATION  09/05/2017    2V CAD, IFR of RCA 0.95     CV CORONARY ANGIOGRAM N/A 3/23/2020    Procedure: Coronary Angiogram and right heart cath;  Surgeon: Gino Ferrer MD;  Location:  HEART CARDIAC CATH LAB     CV HEART CATHETERIZATION WITH POSSIBLE INTERVENTION N/A 5/18/2020    Procedure: Heart Catheterization with Possible Intervention;  Surgeon: Gaurang Swenson MD;  Location:  HEART CARDIAC CATH LAB     CV INSTANTANEOUS WAVE-FREE RATIO N/A 3/23/2020    Procedure: Instantaneous Wave-Free Ratio;  Surgeon: Gino Ferrer MD;  Location:  HEART CARDIAC CATH LAB     CV PCI ATHERECTOMY ORBITAL N/A 5/18/2020    Procedure: Percutaneous Coronary Intervention Atherectomy Rotational;  Surgeon: Gaurang Swenson MD;  Location:  HEART CARDIAC CATH LAB     CV PCI STENT DRUG ELUTING N/A 5/18/2020    Procedure: Percutaneous Coronary Intervention Stent Drug Eluting;  Surgeon: Gaurang Swenson MD;  Location:  HEART CARDIAC CATH LAB     CV TEMPORARY PACEMAKER INSERTION N/A 5/18/2020    Procedure: Temporary Pacemaker Insertion;  Surgeon: Gaurang Swenson MD;  Location:  HEART CARDIAC CATH LAB     ESOPHAGOSCOPY, GASTROSCOPY, DUODENOSCOPY (EGD), COMBINED N/A 9/8/2018    Procedure: COMBINED ESOPHAGOSCOPY, GASTROSCOPY, DUODENOSCOPY (EGD), BIOPSY SINGLE OR MULTIPLE;;  Surgeon: Xander Marie MD;  Location:  GI     HEAD & NECK SURGERY       ORTHOPEDIC SURGERY      right arm ulna reset after injury     THORACOSCOPIC WEDGE RESECTION LUNG Right 8/2/2016    Procedure: THORACOSCOPIC WEDGE RESECTION LUNG;  Surgeon: Abdelrahman Noriega MD;  Location:  OR        FAMILY HISTORY:   Family History   Problem Relation Age of Onset     Blood Disease Mother         Anemia     Cardiovascular Father      Cancer - colorectal Maternal Grandfather      Hypertension Brother      Diabetes Sister 5        Juvinile Diabetes passed at 36     Respiratory Other         Lung Cancer       SOCIAL HISTORY:  Social History     Tobacco Use     Smoking status: Former Smoker     Packs/day: 1.00     Years: 30.00     Pack years: 30.00     Types: Cigarettes     Last attempt to quit: 2013     Years since quittin.8     Smokeless tobacco: Never Used   Substance Use Topics     Alcohol use: Yes     Alcohol/week: 42.0 standard drinks     Types: 42 Standard drinks or equivalent per week     Comment: occ       MEDICATIONS:  Current Outpatient Medications   Medication Sig Dispense Refill     acetaminophen (TYLENOL) 325 MG tablet Take 650 mg by mouth every 4 hours as needed for mild pain        aspirin (ASA) 81 MG EC tablet Take 1 tablet (81 mg) by mouth daily Start tomorrow morning. 90 tablet 3     atorvastatin (LIPITOR) 40 MG tablet Take 1 tablet (40 mg) by mouth daily 90 tablet 3     calcium carbonate 600 mg-vitamin D 400 units (CALTRATE) 600-400 MG-UNIT per tablet Take 1 tablet by mouth 2 times daily       clopidogrel (PLAVIX) 75 MG tablet Take 1 tablet (75 mg) by mouth daily 90 tablet 3     Cyanocobalamin 2000 MCG TABS Take 2,000 mcg by mouth every 7 days On Sundays       digoxin (LANOXIN) 250 MCG tablet Take 1 tablet (250 mcg) by mouth daily 30 tablet 0     gabapentin (NEURONTIN) 300 MG capsule Take 2 capsules (600 mg) by mouth every evening 60 capsule 5     levofloxacin (LEVAQUIN) 750 MG tablet Take 1 tablet (750 mg) by mouth daily 3 tablet 0     loperamide (IMODIUM) 2 MG capsule Take 2 mg by mouth 4 times daily as needed for diarrhea       melatonin 1 MG TABS tablet Take 3 mg by mouth nightly as needed for sleep        metoprolol tartrate (LOPRESSOR) 25 MG tablet Take 0.5 tablets (12.5 mg)  by mouth 2 times daily Hold for SBP<100 or HR<60 60 tablet 0     multivitamin (OCUVITE) TABS Take 1 tablet by mouth 2 times daily        nitroGLYcerin (NITROSTAT) 0.4 MG sublingual tablet For chest pain place 1 tablet under the tongue every 5 minutes for 3 doses. If symptoms persist 5 minutes after 1st dose call 911. 20 tablet 0     oxyCODONE (ROXICODONE) 5 MG tablet Take 0.5 tablets (2.5 mg) by mouth every 8 hours as needed for moderate to severe pain 15 tablet 0     polyethylene glycol (MIRALAX/GLYCOLAX) powder Take 1 capful by mouth daily as needed for constipation       potassium chloride ER 20 MEQ PO CR tablet Take 1 tablet (20 mEq) by mouth daily 90 tablet 3     Skin Protectants, Misc. (EUCERIN) cream Apply topically every hour as needed for dry skin 120 g 0       REVIEW OF SYSTEMS:   10 point review of system was performed as much as possible as stated in HPI.    Objective:   Limited visit exam done given COVID-19 precautions.   Vital signs in the chart were reviewed.  Vital Signs: Blood pressure 93/60, heart rate 95, respiratory rate 18, temperature 98.7  F, oxygen saturation 100%, weight 148.6 LBS  Constitutional: Somewhat weak appearing but comfortable and in no acute distress  HEENT: Mild conjunctival pallor  Neurologic: Awake, alert, oriented x3  Extremities: No significant lower extremity edema  Skin/integument: No acute rash, no cyanosis    Labs:   Lab Results   Component Value Date    WBC 11.6 06/08/2020     Lab Results   Component Value Date    RBC 3.38 06/08/2020     Lab Results   Component Value Date    HGB 10.4 06/08/2020     Lab Results   Component Value Date    HCT 33.5 06/08/2020     Lab Results   Component Value Date    MCV 99 06/08/2020     Lab Results   Component Value Date    MCH 30.8 06/08/2020     Lab Results   Component Value Date    MCHC 31.0 06/08/2020     Lab Results   Component Value Date    RDW 17.1 06/08/2020     Lab Results   Component Value Date     06/08/2020     Last  Comprehensive Metabolic Panel:  Sodium   Date Value Ref Range Status   06/08/2020 139 133 - 144 mmol/L Final     Potassium   Date Value Ref Range Status   06/08/2020 3.5 3.4 - 5.3 mmol/L Final     Chloride   Date Value Ref Range Status   06/08/2020 103 94 - 109 mmol/L Final     Carbon Dioxide   Date Value Ref Range Status   06/08/2020 27 20 - 32 mmol/L Final     Anion Gap   Date Value Ref Range Status   06/08/2020 9 3 - 14 mmol/L Final     Glucose   Date Value Ref Range Status   06/08/2020 113 (H) 70 - 99 mg/dL Final     Urea Nitrogen   Date Value Ref Range Status   06/08/2020 15 7 - 30 mg/dL Final     Creatinine   Date Value Ref Range Status   06/08/2020 1.12 0.66 - 1.25 mg/dL Final     GFR Estimate   Date Value Ref Range Status   06/08/2020 63 >60 mL/min/[1.73_m2] Final     Comment:     Non  GFR Calc  Starting 12/18/2018, serum creatinine based estimated GFR (eGFR) will be   calculated using the Chronic Kidney Disease Epidemiology Collaboration   (CKD-EPI) equation.       Calcium   Date Value Ref Range Status   06/08/2020 8.9 8.5 - 10.1 mg/dL Final       ASSESSMENT/PLAN:  Acute blood loss anemia,  Left inguinal and scrotal hematoma,  Post procedure hemorrhagic shock.  Bleeding from left common femoral artery at the catheterization site.   Hemostasis was achieved by ultrasound guided direct pressure as well as repositioning of FemoStop.  Patient was resuscitated with 4 units of PRBCs.  He also received IV iron x2.  Most recent hemoglobin was stable at 10.4 on June 8.  Plan:  Monitor hemoglobin periodically    Single-vessel coronary artery disease, status post 4 drug-eluting stents to RCA on May 18, 2020,  Dyslipidemia.  Stable.  Plan:  Continue dual antiplatelet therapy with aspirin 81 mg daily and Plavix 75 mg daily  Continue metoprolol 12.5 mg p.o. twice daily, atorvastatin 40 mg p.o. daily, and PRN nitroglycerin sublingual    Essential hypertension.  Blood pressure is running low to  low-normal.  Plan:  May need to reduce the dose of metoprolol from 12.5 mg p.o. twice daily to 6.25 mg p.o. twice daily if BP does not improve  Monitor blood pressure  Await input from cardiology, scheduled for video visit on 06/11    Chronic atrial fibrillation with RVR,  History of CVA in 2015.  Heart rate is currently in the range of .  Plan:  Continue to hold Eliquis pending further input from cardiology due to blood loss anemia and hematoma as above (he is scheduled for cardiac follow-up on June 11)  Continue metoprolol 12.5 mg p.o. twice daily and digoxin 250 mcg p.o. daily  Monitor heart rate    Moderate-severe aortic stenosis.  Plan:  TAVR as an outpatient after recovery from recent hospitalization for hemorrhagic shock    Question bilateral basilar pneumonia.  He completed the course of antibiotics on June 8.  Plan:  Monitor respiratory status    Collagenous colitis.  Patient has chronic diarrhea, 3-4 times a day.  Plan:  Continue PRN Imodium    Oropharyngeal dysphagia.  Plan:  Continue dysphagia diet level 2 with regular consistency  Continue SLP evaluation and therapy    Obstructive sleep apnea.  Patient normally uses CPAP at night.  He states that his CPAP machine requires disassembling and assembling which is quite complex and therefore he is not able to bring it in to use it in TCU.  Plan:  Monitor O2 sat    Physical deconditioning.  Plan:  Continue PT/OT evaluation and therapy      Orders written by provider at facility:  None    Electronically signed by:  Jaycob Naqvi MD     Video-Visit Details  Type of service:  Video Visit  Video End Time (time video stopped): 10:40 am  Distant Location (provider location):  [facility]

## 2020-06-10 ENCOUNTER — VIRTUAL VISIT (OUTPATIENT)
Dept: GERIATRICS | Facility: CLINIC | Age: 75
End: 2020-06-10
Payer: MEDICARE

## 2020-06-10 DIAGNOSIS — I48.20 CHRONIC A-FIB (H): ICD-10-CM

## 2020-06-10 DIAGNOSIS — D62 ANEMIA DUE TO BLOOD LOSS, ACUTE: Primary | ICD-10-CM

## 2020-06-10 DIAGNOSIS — I10 BENIGN ESSENTIAL HYPERTENSION: ICD-10-CM

## 2020-06-10 DIAGNOSIS — J18.9 PNEUMONIA OF BOTH LOWER LOBES DUE TO INFECTIOUS ORGANISM: ICD-10-CM

## 2020-06-10 DIAGNOSIS — I25.10 CORONARY ARTERY DISEASE INVOLVING NATIVE CORONARY ARTERY OF NATIVE HEART WITHOUT ANGINA PECTORIS: ICD-10-CM

## 2020-06-10 DIAGNOSIS — G47.33 OSA (OBSTRUCTIVE SLEEP APNEA): ICD-10-CM

## 2020-06-10 DIAGNOSIS — R53.81 PHYSICAL DECONDITIONING: ICD-10-CM

## 2020-06-10 DIAGNOSIS — K52.831 COLLAGENOUS COLITIS: ICD-10-CM

## 2020-06-10 DIAGNOSIS — R57.8 HEMORRHAGIC SHOCK (H): ICD-10-CM

## 2020-06-10 DIAGNOSIS — Z86.73 HISTORY OF CVA (CEREBROVASCULAR ACCIDENT): ICD-10-CM

## 2020-06-10 DIAGNOSIS — I35.0 AORTIC VALVE STENOSIS, ETIOLOGY OF CARDIAC VALVE DISEASE UNSPECIFIED: ICD-10-CM

## 2020-06-10 DIAGNOSIS — R13.12 OROPHARYNGEAL DYSPHAGIA: ICD-10-CM

## 2020-06-10 DIAGNOSIS — T14.8XXA HEMATOMA: ICD-10-CM

## 2020-06-10 PROCEDURE — 99305 1ST NF CARE MODERATE MDM 35: CPT | Mod: 95 | Performed by: INTERNAL MEDICINE

## 2020-06-10 NOTE — LETTER
"    6/10/2020        RE: Efrem Ramos  [address] [facility] [address]        [facility]   Efrem Ramos is being evaluated via a billable video visit due to the restrictions of the Covid-19 pandemic.   The patient has been notified of following:  This video visit will be conducted via a call between you and your provider. We have found that certain health care needs can be provided without the need for an in-person physical exam.  This service lets us provide the care you need with a video conversation. If during the course of the call the provider feels a video visit is not appropriate, you will not be charged for this service.\"   The provider has received verbal consent for a Video Visit from the patient or first contact? Yes  Patient  or facility staff would like the video invitation sent by: Send to e-mail at: [email or web address]  Video Start Time: 10:30 am  [facility] Medical Record Number:  [id number]  Place of Location at the time of visit: [facility]     CHIEF COMPLAINT:  Hospital follow-up/Initial visit    HPI:  Efrem Ramos  is a 74 year old (1945), who is being seen today for a visit.  HPI information obtained from: patient report and [facility] chart review.     Medical history is significant for coronary artery disease, aortic stenosis, mitral stenosis, chronic atrial fibrillation on Eliquis, hypertension, CVA, COPD, obstructive sleep apnea, peripheral vascular disease, dyslipidemia, vocal cord cancer, shingles, pneumonia, collagenous colitis, carpal tunnel syndrome, and demyelinating disease of central nervous system.    He was hospitalized at [facility] from May 18 through June 4, 2020 following complications after an elective cardiac catheterization and placement of multiple stents to RCA.  Postprocedure he was noted to have persistent catheter site bleeding with massive hematoma and associated hemorrhagic shock.  There was " active extravasation from the left common femoral artery into the left groin and extensive hemorrhage into the scrotum.  He was resuscitated with total of 4 units of PRBC and managed initially in ICU.  Vascular surgery was consulted urgently and hemostasis was achieved with direct ultrasound-guided pressure and repositioning of FemoStop.  Urology was consulted for scrotal hematoma, recommending scrotal support.  Patient had uncontrolled A. fib with RVR and therefore started on digoxin.  Metoprolol was started once blood pressure stabilized.  Emergent cardiac cath was done on May 28 due to acute hypotension, leukocytosis, tachycardia, and elevated lactate of 3.3 (report is still pending).  Metoprolol was decreased and the Zosyn was restarted.  Chest x-ray on May 29, showed new bibasilar interstitial infiltrates suggesting infection or edema.  Given elevated lactate and mild leukocytosis, he was treated with vancomycin and ceftriaxone.  He was discharged on Levaquin for 3 days.  Blood cultures did not yield any growth.  CT head done on May 29 for altered level of consciousness, and showed diffuse cerebral volume loss and changes consistent with diffuse cerebral volume loss and chronic small vessel ischemic disease but no acute pathology.  SLP evaluation was performed and he was started on dysphagia diet level 2 with thin liquids.    At time of discharge from hospital, hemoglobin was 8.8.  Notably he received IV iron x2 while inpatient.    Patient is admitted to this facility for rehabilitation and continuation of medical management.  He appears comfortable.  He complains of swelling in his scrotum as well as penis which at times interferes with normal urination.  His blood pressure is running low to low normal.  He feels somewhat dizzy upon standing. He reports dyspnea with moderate exertion.  He denies fever, chills, chest pain, palpitation,  nausea, vomiting, or abdominal pain.  He does endorse 3-4 loose/watery BMs  a day which is normal for him due to collagenous colitis.      Patient was seen today through a virtual video visit.     CODE STATUS:   CPR/Full code     Past Medical and Surgical History reviewed in [facility] today.    PAST MEDICAL HISTORY:   1 vessel coronary artery disease, status post drug-eluting stents x4  from proximal to distal RCA on May 18, 2020  Hypertension  Dyslipidemia  Chronic atrial fibrillation, on Eliquis  CVA  Peripheral arterial disease (obstructive disease in the right common iliac artery)  Moderate-severe aortic stenosis  Mild to moderate mitral stenosis  Moderate tricuspid regurgitation  COPD  Obstructive sleep apnea, on CPAP  Vocal cord cancer  Shingles  Pneumonia  Carpal tunnel syndrome  Demyelinating disease of central nervous system  Collagenous colitis  Post-cardiac cath left inguinal and scrotal hematoma with hemorrhagic shock, as outlined in HPI    Past Medical History:   Diagnosis Date     Atrial fibrillation (H)      Benign essential hypertension 11/20/2018     Cancer (H) vocal cord     Carpal tunnel syndrome     abstracted 7/3/02.     CVA (cerebral infarction) 5/5/2015     Demyelinating disease of central nervous system, unspecified (H)     abstracted 7/3/02.     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     Pneumonia 8/17/2016     PVD (peripheral vascular disease) (H)      Redundant colon     needs CT colonography     S/P coronary angiogram 09/05/2017     Shingles      SKIN DISORDERS NEC 3/15/2005     Sleep apnea        PAST SURGICAL HISTORY:   Past Surgical  History:   Procedure Laterality Date     BIOPSY  brain 2002     BONE MARROW BIOPSY, BONE SPECIMEN, NEEDLE/TROCAR N/A 6/8/2017    Procedure: BIOPSY BONE MARROW;  UNILATERAL BONE MARROW BIOPSY (CONSCIOUS SEDATION) ;  Surgeon: Jamie Gonzales MD;  Location:  GI     C NONSPECIFIC PROCEDURE  as a child    T & A. abstracted 7/3/02.     C NONSPECIFIC PROCEDURE  early    CTR. abstracted 7/3/02.     CARDIAC CATHERIZATION  09/05/2017    2V CAD, IFR of RCA 0.95     CV CORONARY ANGIOGRAM N/A 3/23/2020    Procedure: Coronary Angiogram and right heart cath;  Surgeon: Gino Ferrer MD;  Location:  HEART CARDIAC CATH LAB     CV HEART CATHETERIZATION WITH POSSIBLE INTERVENTION N/A 5/18/2020    Procedure: Heart Catheterization with Possible Intervention;  Surgeon: Gaurang Swenson MD;  Location:  HEART CARDIAC CATH LAB     CV INSTANTANEOUS WAVE-FREE RATIO N/A 3/23/2020    Procedure: Instantaneous Wave-Free Ratio;  Surgeon: Gino Ferrer MD;  Location: [facility] CARDIAC CATH LAB     CV PCI ATHERECTOMY ORBITAL N/A 5/18/2020    Procedure: Percutaneous Coronary Intervention Atherectomy Rotational;  Surgeon: Gaurang Swenson MD;  Location: [facility] CARDIAC CATH LAB     CV PCI STENT DRUG ELUTING N/A 5/18/2020    Procedure: Percutaneous Coronary Intervention Stent Drug Eluting;  Surgeon: Gaurang Swenson MD;  Location: [facility] CARDIAC CATH LAB     CV TEMPORARY PACEMAKER INSERTION N/A 5/18/2020    Procedure: Temporary Pacemaker Insertion;  Surgeon: Gaurang Swenson MD;  Location:  HEART CARDIAC CATH LAB     ESOPHAGOSCOPY, GASTROSCOPY, DUODENOSCOPY (EGD), COMBINED N/A 9/8/2018    Procedure: COMBINED ESOPHAGOSCOPY, GASTROSCOPY, DUODENOSCOPY (EGD), BIOPSY SINGLE OR MULTIPLE;;  Surgeon: Xander Marie MD;  Location:  GI     HEAD & NECK SURGERY       ORTHOPEDIC SURGERY      right arm ulna reset after injury     THORACOSCOPIC WEDGE RESECTION LUNG Right 8/2/2016    Procedure:  THORACOSCOPIC WEDGE RESECTION LUNG;  Surgeon: Abdelrahman Noriega MD;  Location:  OR       FAMILY HISTORY:   Family History   Problem Relation Age of Onset     Blood Disease Mother         Anemia     Cardiovascular Father      Cancer - colorectal Maternal Grandfather      Hypertension Brother      Diabetes Sister 5        Juvinile Diabetes passed at 36     Respiratory Other         Lung Cancer       SOCIAL HISTORY:  Social History     Tobacco Use     Smoking status: Former Smoker     Packs/day: 1.00     Years: 30.00     Pack years: 30.00     Types: Cigarettes     Last attempt to quit: 2013     Years since quittin.8     Smokeless tobacco: Never Used   Substance Use Topics     Alcohol use: Yes     Alcohol/week: 42.0 standard drinks     Types: 42 Standard drinks or equivalent per week     Comment: occ       MEDICATIONS:  Current Outpatient Medications   Medication Sig Dispense Refill     acetaminophen (TYLENOL) 325 MG tablet Take 650 mg by mouth every 4 hours as needed for mild pain        aspirin (ASA) 81 MG EC tablet Take 1 tablet (81 mg) by mouth daily Start tomorrow morning. 90 tablet 3     atorvastatin (LIPITOR) 40 MG tablet Take 1 tablet (40 mg) by mouth daily 90 tablet 3     calcium carbonate 600 mg-vitamin D 400 units (CALTRATE) 600-400 MG-UNIT per tablet Take 1 tablet by mouth 2 times daily       clopidogrel (PLAVIX) 75 MG tablet Take 1 tablet (75 mg) by mouth daily 90 tablet 3     Cyanocobalamin 2000 MCG TABS Take 2,000 mcg by mouth every 7 days On Sundays       digoxin (LANOXIN) 250 MCG tablet Take 1 tablet (250 mcg) by mouth daily 30 tablet 0     gabapentin (NEURONTIN) 300 MG capsule Take 2 capsules (600 mg) by mouth every evening 60 capsule 5     levofloxacin (LEVAQUIN) 750 MG tablet Take 1 tablet (750 mg) by mouth daily 3 tablet 0     loperamide (IMODIUM) 2 MG capsule Take 2 mg by mouth 4 times daily as needed for diarrhea       melatonin 1 MG TABS tablet Take 3 mg by mouth nightly as  needed for sleep        metoprolol tartrate (LOPRESSOR) 25 MG tablet Take 0.5 tablets (12.5 mg) by mouth 2 times daily Hold for SBP<100 or HR<60 60 tablet 0     multivitamin (OCUVITE) TABS Take 1 tablet by mouth 2 times daily        nitroGLYcerin (NITROSTAT) 0.4 MG sublingual tablet For chest pain place 1 tablet under the tongue every 5 minutes for 3 doses. If symptoms persist 5 minutes after 1st dose call 911. 20 tablet 0     oxyCODONE (ROXICODONE) 5 MG tablet Take 0.5 tablets (2.5 mg) by mouth every 8 hours as needed for moderate to severe pain 15 tablet 0     polyethylene glycol (MIRALAX/GLYCOLAX) powder Take 1 capful by mouth daily as needed for constipation       potassium chloride ER 20 MEQ PO CR tablet Take 1 tablet (20 mEq) by mouth daily 90 tablet 3     Skin Protectants, Misc. (EUCERIN) cream Apply topically every hour as needed for dry skin 120 g 0       REVIEW OF SYSTEMS:   10 point review of system was performed as much as possible as stated in HPI.    Objective:   Limited visit exam done given COVID-19 precautions.   Vital signs in the chart were reviewed.  Vital Signs: Blood pressure 93/60, heart rate 95, respiratory rate 18, temperature 98.7  F, oxygen saturation 100%, weight 148.6 LBS  Constitutional: Somewhat weak appearing but comfortable and in no acute distress  HEENT: Mild conjunctival pallor  Neurologic: Awake, alert, oriented x3  Extremities: No significant lower extremity edema  Skin/integument: No acute rash, no cyanosis    Labs:   Lab Results   Component Value Date    WBC 11.6 06/08/2020     Lab Results   Component Value Date    RBC 3.38 06/08/2020     Lab Results   Component Value Date    HGB 10.4 06/08/2020     Lab Results   Component Value Date    HCT 33.5 06/08/2020     Lab Results   Component Value Date    MCV 99 06/08/2020     Lab Results   Component Value Date    MCH 30.8 06/08/2020     Lab Results   Component Value Date    MCHC 31.0 06/08/2020     Lab Results   Component Value Date     RDW 17.1 06/08/2020     Lab Results   Component Value Date     06/08/2020     Last Comprehensive Metabolic Panel:  Sodium   Date Value Ref Range Status   06/08/2020 139 133 - 144 mmol/L Final     Potassium   Date Value Ref Range Status   06/08/2020 3.5 3.4 - 5.3 mmol/L Final     Chloride   Date Value Ref Range Status   06/08/2020 103 94 - 109 mmol/L Final     Carbon Dioxide   Date Value Ref Range Status   06/08/2020 27 20 - 32 mmol/L Final     Anion Gap   Date Value Ref Range Status   06/08/2020 9 3 - 14 mmol/L Final     Glucose   Date Value Ref Range Status   06/08/2020 113 (H) 70 - 99 mg/dL Final     Urea Nitrogen   Date Value Ref Range Status   06/08/2020 15 7 - 30 mg/dL Final     Creatinine   Date Value Ref Range Status   06/08/2020 1.12 0.66 - 1.25 mg/dL Final     GFR Estimate   Date Value Ref Range Status   06/08/2020 63 >60 mL/min/[1.73_m2] Final     Comment:     Non  GFR Calc  Starting 12/18/2018, serum creatinine based estimated GFR (eGFR) will be   calculated using the Chronic Kidney Disease Epidemiology Collaboration   (CKD-EPI) equation.       Calcium   Date Value Ref Range Status   06/08/2020 8.9 8.5 - 10.1 mg/dL Final       ASSESSMENT/PLAN:  Acute blood loss anemia,  Left inguinal and scrotal hematoma,  Post procedure hemorrhagic shock.  Bleeding from left common femoral artery at the catheterization site.   Hemostasis was achieved by ultrasound guided direct pressure as well as repositioning of FemoStop.  Patient was resuscitated with 4 units of PRBCs.  He also received IV iron x2.  Most recent hemoglobin was stable at 10.4 on June 8.  Plan:  Monitor hemoglobin periodically    Single-vessel coronary artery disease, status post 4 drug-eluting stents to RCA on May 18, 2020,  Dyslipidemia.  Stable.  Plan:  Continue dual antiplatelet therapy with aspirin 81 mg daily and Plavix 75 mg daily  Continue metoprolol 12.5 mg p.o. twice daily, atorvastatin 40 mg p.o. daily, and PRN  nitroglycerin sublingual    Essential hypertension.  Blood pressure is running low to low-normal.  Plan:  May need to reduce the dose of metoprolol from 12.5 mg p.o. twice daily to 6.25 mg p.o. twice daily if BP does not improve  Monitor blood pressure  Await input from cardiology, scheduled for video visit on 06/11    Chronic atrial fibrillation with RVR,  History of CVA in 2015.  Heart rate is currently in the range of .  Plan:  Continue to hold Eliquis pending further input from cardiology due to blood loss anemia and hematoma as above (he is scheduled for cardiac follow-up on June 11)  Continue metoprolol 12.5 mg p.o. twice daily and digoxin 250 mcg p.o. daily  Monitor heart rate    Moderate-severe aortic stenosis.  Plan:  TAVR as an outpatient after recovery from recent hospitalization for hemorrhagic shock    Question bilateral basilar pneumonia.  He completed the course of antibiotics on June 8.  Plan:  Monitor respiratory status    Collagenous colitis.  Patient has chronic diarrhea, 3-4 times a day.  Plan:  Continue PRN Imodium    Oropharyngeal dysphagia.  Plan:  Continue dysphagia diet level 2 with regular consistency  Continue SLP evaluation and therapy    Obstructive sleep apnea.  Patient normally uses CPAP at night.  He states that his CPAP machine requires disassembling and assembling which is quite complex and therefore he is not able to bring it in to use it in TCU.  Plan:  Monitor O2 sat    Physical deconditioning.  Plan:  Continue PT/OT evaluation and therapy      Orders written by provider at facility:  None    Electronically signed by:  Jaycob Naqvi MD     Video-Visit Details  Type of service:  Video Visit  Video End Time (time video stopped): 10:40 am  Distant Location (provider location):  [facility]       Sincerely,        Jaycob Naqvi MD

## 2020-06-12 ENCOUNTER — NURSING HOME VISIT (OUTPATIENT)
Dept: GERIATRICS | Facility: CLINIC | Age: 75
End: 2020-06-12
Payer: MEDICARE

## 2020-06-12 ENCOUNTER — HOSPITAL LABORATORY (OUTPATIENT)
Dept: OTHER | Facility: CLINIC | Age: 75
End: 2020-06-12

## 2020-06-12 VITALS
HEIGHT: 67 IN | WEIGHT: 148.2 LBS | TEMPERATURE: 98.8 F | RESPIRATION RATE: 16 BRPM | DIASTOLIC BLOOD PRESSURE: 63 MMHG | SYSTOLIC BLOOD PRESSURE: 92 MMHG | HEART RATE: 91 BPM | BODY MASS INDEX: 23.26 KG/M2

## 2020-06-12 DIAGNOSIS — R33.9 URINARY RETENTION: ICD-10-CM

## 2020-06-12 DIAGNOSIS — I10 BENIGN ESSENTIAL HYPERTENSION: ICD-10-CM

## 2020-06-12 DIAGNOSIS — D62 ANEMIA DUE TO BLOOD LOSS, ACUTE: Primary | ICD-10-CM

## 2020-06-12 DIAGNOSIS — I48.20 CHRONIC A-FIB (H): ICD-10-CM

## 2020-06-12 DIAGNOSIS — R13.12 OROPHARYNGEAL DYSPHAGIA: ICD-10-CM

## 2020-06-12 DIAGNOSIS — I35.0 NONRHEUMATIC AORTIC VALVE STENOSIS: ICD-10-CM

## 2020-06-12 DIAGNOSIS — T14.8XXA HEMATOMA: ICD-10-CM

## 2020-06-12 DIAGNOSIS — I25.10 CORONARY ARTERY DISEASE INVOLVING NATIVE CORONARY ARTERY OF NATIVE HEART WITHOUT ANGINA PECTORIS: ICD-10-CM

## 2020-06-12 DIAGNOSIS — G47.33 OSA (OBSTRUCTIVE SLEEP APNEA): ICD-10-CM

## 2020-06-12 DIAGNOSIS — K52.831 COLLAGENOUS COLITIS: ICD-10-CM

## 2020-06-12 DIAGNOSIS — J18.9 PNEUMONIA OF BOTH LOWER LOBES DUE TO INFECTIOUS ORGANISM: ICD-10-CM

## 2020-06-12 DIAGNOSIS — R53.81 PHYSICAL DECONDITIONING: ICD-10-CM

## 2020-06-12 LAB
C DIFF TOX B STL QL: NEGATIVE
SPECIMEN SOURCE: NORMAL

## 2020-06-12 PROCEDURE — 99309 SBSQ NF CARE MODERATE MDM 30: CPT | Performed by: NURSE PRACTITIONER

## 2020-06-12 ASSESSMENT — MIFFLIN-ST. JEOR: SCORE: 1360.86

## 2020-06-12 NOTE — PROGRESS NOTES
[facility] [facility]  [facility] Record Number: [id number]  Place of Service where encounter took place: [facility] [[id number]]  Chief Complaint   Patient presents with     RECHECK       HPI:    Efrem Ramos is a 74 year old (1945), who is being seen today for an episodic care visit. HPI information obtained from: facility chart records, facility staff, patient report and [facility] chart review.   He came to this facility 6/4/2020 for short term rehab and medical management following hospitalization 5/18/2020 due to   hemorrhagic shock secondary to cath site bleeding and a massive hematoma of his left groin post elective  right coronary artery stenting X 4, in anticipation of TAVR. Eliquis had been held 3 days prior to the procedure. CTA abdomen/pelvis showed active extravasation from the left common femoral artery into the left groin with extensive hemorrhage. Vascular Surgery was consulted and hemostasis was achieved with direct US guided pressure. He required pressor support and received a total of 4 units of PRBC. IV iron was given X 2. Hgb stabilized in the mid 8s. Eliquis is on hold pending Cardiology follow up. ASA/Plavix continued given recent PCI. Follow up US 5/29/2020 showed no recurrent pseudoaneurysm, residual left groin hematoma was noted.   Urology consulted for scrotal edema and recommended support and elevation. Tamsulosin was started for urinary retention with high PVRs.   Emergent cardiac cath was done 5/28/2020 due to acute hypotension, leukocytosis, tachycardia and elevated lactate of 3.3.  Metoprolol was decreased and digoxin was started. He had transient tachycardia in the 120s with ambulation, HR improved at rest. SBP intermittently dropped to the 80-90s, but he remained asymptomatic.   CXR 5/29/2020 showed new bibasilar interstitial infiltrates suggesting infection or edema. Given elevated lactate and WBC 12.4, he was treated with vancomycin  and ceftriaxone. Discharged on levaquin for 3 days. Blood cultures no growth. CT head was done 5/29/2020 for altered level of consciousness and showed diffuse cerebral volume loss and changes consistent with small vessel ischemic disease, no acute pathology. SPEECH THERAPY evaluated for dysphagia and recommended DD2 with thin liquids.   At discharge: WBC 12.0, Hgb 8.8, creatinine 0.91.     Today's concern is:     Anemia due to blood loss, acute  Hematoma  Coronary artery disease involving native coronary artery of native heart without angina pectoris  Nonrheumatic aortic valve stenosis  Chronic a-fib (H)-HR: 77-91   Pneumonia of both lower lobes due to infectious organism-afebrile. Denies cough, shortness of breath or chest pain.   Oropharyngeal dysphagia  Benign essential hypertension-BPs: 97/63, 92/58, 95/63   Urinary retention-feels that he's emptying his bladder. No pain or burning   Collagenous colitis-reports not feeling as well today with poor appetite, mild nausea and abdominal cramping. Reports an episode of watery stools this morning. Doesn't like the modified diet.   MEL (obstructive sleep apnea)  Physical deconditioning-ambulating short distances with walker and stand by assist. Requires contact guard to min assist with cares.   SLUMS 24/30     Past Medical and Surgical History reviewed in Epic today.    MEDICATIONS:    Current Outpatient Medications   Medication Sig Dispense Refill     acetaminophen (TYLENOL) 325 MG tablet Take 650 mg by mouth every 4 hours as needed for mild pain        aspirin (ASA) 81 MG EC tablet Take 1 tablet (81 mg) by mouth daily Start tomorrow morning. 90 tablet 3     atorvastatin (LIPITOR) 40 MG tablet Take 1 tablet (40 mg) by mouth daily 90 tablet 3     calcium carbonate 600 mg-vitamin D 400 units (CALTRATE) 600-400 MG-UNIT per tablet Take 1 tablet by mouth 2 times daily       clopidogrel (PLAVIX) 75 MG tablet Take 1 tablet (75 mg) by mouth daily 90 tablet 3     Cyanocobalamin  "2000 MCG TABS Take 2,000 mcg by mouth every 7 days On Sundays       digoxin (LANOXIN) 250 MCG tablet Take 1 tablet (250 mcg) by mouth daily 30 tablet 0     gabapentin (NEURONTIN) 300 MG capsule Take 2 capsules (600 mg) by mouth every evening 60 capsule 5     levofloxacin (LEVAQUIN) 750 MG tablet Take 1 tablet (750 mg) by mouth daily 3 tablet 0     loperamide (IMODIUM) 2 MG capsule Take 2 mg by mouth 4 times daily as needed for diarrhea       melatonin 1 MG TABS tablet Take 3 mg by mouth nightly as needed for sleep        metoprolol tartrate (LOPRESSOR) 25 MG tablet Take 0.5 tablets (12.5 mg) by mouth 2 times daily Hold for SBP<100 or HR<60 60 tablet 0     multivitamin (OCUVITE) TABS Take 1 tablet by mouth 2 times daily        nitroGLYcerin (NITROSTAT) 0.4 MG sublingual tablet For chest pain place 1 tablet under the tongue every 5 minutes for 3 doses. If symptoms persist 5 minutes after 1st dose call 911. 20 tablet 0     oxyCODONE (ROXICODONE) 5 MG tablet Take 0.5 tablets (2.5 mg) by mouth every 8 hours as needed for moderate to severe pain 15 tablet 0     polyethylene glycol (MIRALAX/GLYCOLAX) powder Take 1 capful by mouth daily as needed for constipation       potassium chloride ER 20 MEQ PO CR tablet Take 1 tablet (20 mEq) by mouth daily 90 tablet 3     Skin Protectants, Misc. (EUCERIN) cream Apply topically every hour as needed for dry skin 120 g 0     REVIEW OF SYSTEMS:  4 point ROS including Respiratory, CV, GI and , other than that noted in the HPI,  is negative    Objective:  BP 92/63   Pulse 91   Temp 98.8  F (37.1  C)   Resp 16   Ht 1.702 m (5' 7\")   Wt 67.2 kg (148 lb 3.2 oz)   PF 96 L/min   BMI 23.21 kg/m    Exam:  GENERAL APPEARANCE:  Alert, in no distress  ENT:  [place]  EYES:  EOM normal, conjunctiva and lids normal  RESP:  no respiratory distress  CV:  No LE  edema  M/S:   in bed. DAMIAN with good strength  SKIN:  fading hematoma over groin, lower abdomen and upper thighs  PSYCH:  oriented X 3, " memory impaired , affect and mood normal    Labs:   Recent labs in [facility] reviewed by me today.     ASSESSMENT/PLAN:  (D62) Anemia due to blood loss, acute  (primary encounter diagnosis)  (T14.8XXA) Hematoma  Comment: post cardiac catheterization, he developed acute bleeding from his left femoral access site with subsequent hemorrhagic shock. Received a total of 4 units PRBC and IV iron X 2. Hgb improved at 10.4 on 6/8/2020 . Hematoma is slowly resolving.   Plan: continue tylenol and oxycodone. CBC 6/15/2020.      (I25.10) Coronary artery disease involving native coronary artery of native heart without angina pectoris   (I35.0) Nonrheumatic aortic valve stenosis  Comment: s/p complex PCI with rotational atherectomy and 4 stents to the proximal to distal RCA on 5/18/2020. Awaiting TAVR after he recuperates.   Plan: continue ASA, Plavix. Continue metoprolol with parameters-see below. He was scheduled for Cardiology follow up 6/11/2020, but it appears this did not happen-will check with unit coordinator to reschedule.      (I48.20) Chronic a-fib  Comment: recent HR: 77-91. Almost every dose of metoprolol has been held for SBP in the 90s.   Plan: continue digoxin. Continue metoprolol with parameters. Hold Eliquis. Cardiology follow up as scheduled.      (J18.1) Pneumonia of both lower lobes due to infectious organism (H)  Comment: completed levaquin 6/7/2020 and infection appears resolved.   Plan: monitor respiratory status, VS. CBC  on 6/15/2020      (R13.12) Oropharyngeal dysphagia  Comment: video swallow done 5/31/2020 showed trace penetration with thin barium, no aspiration. Tolerating DD2 with thin liquids. Oral intake is fair to poor, in part due to the modified diet.   Plan: continue SPEECH THERAPY, advance diet as tolerated. Encourage fluids. BMP 6/15/2020       (I10) Benign essential hypertension  Comment: metoprolol has been held for SBP in the 90s. Denies feeling dizzy or lightheaded.   Plan: continue  metoprolol with parameters. Monitor VS. Avoid hypotension due to advanced age and fall risk.      Urinary retention  Comment: appears resolved. Scrotal edema has improved.   Plan: continue tamsulosin.  Urology follow up as scheduled.      (K53.621) Collagenous colitis  Comment: intermittent nausea, cramping and loose stools.   Plan: obtain specimen for C diff.      (G47.33) MEL (obstructive sleep apnea)  Comment: reports he has not used his CPAP for the past few months. States that his machine is a different type of set up and too complicated to have family bring to the facility.   Plan: closely monitor respiratory status and sleep.     (R53.81) Physical deconditioning  Comment: slowly progressing in therapies.   Plan: continue PHYSICAL THERAPY/OT. Goal is to return home with his wife and home care services.     Electronically signed by:  FABI Fuentes CNP

## 2020-06-12 NOTE — LETTER
6/12/2020        RE: Efrem Ramos  [address] [facility] [address] [address] [facility] [address]        [place] [facility]  [place] [facility] Record Number: [id number]  [facility] of [facility] where encounter took place[facility] [[id number]]  Chief Complaint   Patient presents with     RECHECK       HPI:    Efrem Ramos is a 74 year old (1945), who is being seen today for an episodic care visit. HPI information obtained from: facility chart records, facility staff, patient report and [facility] chart review.   He came to this facility 6/4/2020 for short term rehab and medical management following hospitalization 5/18/2020 due to   hemorrhagic shock secondary to cath site bleeding and a massive hematoma of his left groin post elective  right coronary artery stenting X 4, in anticipation of TAVR. Eliquis had been held 3 days prior to the procedure. CTA abdomen/pelvis showed active extravasation from the left common femoral artery into the left groin with extensive hemorrhage. Vascular Surgery was consulted and hemostasis was achieved with direct US guided pressure. He required pressor support and received a total of 4 units of PRBC. IV iron was given X 2. Hgb stabilized in the mid 8s. Eliquis is on hold pending Cardiology follow up. ASA/Plavix continued given recent PCI. Follow up US 5/29/2020 showed no recurrent pseudoaneurysm, residual left groin hematoma was noted.   Urology consulted for scrotal edema and recommended support and elevation. Tamsulosin was started for urinary retention with high PVRs.   Emergent cardiac cath was done 5/28/2020 due to acute hypotension, leukocytosis, tachycardia and elevated lactate of 3.3.  Metoprolol was decreased and digoxin was started. He had transient tachycardia in the 120s with ambulation, HR improved at rest. SBP intermittently dropped to the 80-90s, but he remained asymptomatic.   CXR 5/29/2020 showed new bibasilar interstitial infiltrates suggesting  infection or edema. Given elevated lactate and WBC 12.4, he was treated with vancomycin and ceftriaxone. Discharged on levaquin for 3 days. Blood cultures no growth. CT head was done 5/29/2020 for altered level of consciousness and showed diffuse cerebral volume loss and changes consistent with small vessel ischemic disease, no acute pathology. SPEECH THERAPY evaluated for dysphagia and recommended DD2 with thin liquids.   At discharge: WBC 12.0, Hgb 8.8, creatinine 0.91.     Today's concern is:     Anemia due to blood loss, acute  Hematoma  Coronary artery disease involving native coronary artery of native heart without angina pectoris  Nonrheumatic aortic valve stenosis  Chronic a-fib (H)-HR: 77-91   Pneumonia of both lower lobes due to infectious organism-afebrile. Denies cough, shortness of breath or chest pain.   Oropharyngeal dysphagia  Benign essential hypertension-BPs: 97/63, 92/58, 95/63   Urinary retention-feels that he's emptying his bladder. No pain or burning   Collagenous colitis-reports not feeling as well today with poor appetite, mild nausea and abdominal cramping. Reports an episode of watery stools this morning. Doesn't like the modified diet.   MEL (obstructive sleep apnea)  Physical deconditioning-ambulating short distances with walker and stand by assist. Requires contact guard to min assist with cares.   SLUMS 24/30     Past Medical and Surgical History reviewed in Epic today.    MEDICATIONS:    Current Outpatient Medications   Medication Sig Dispense Refill     acetaminophen (TYLENOL) 325 MG tablet Take 650 mg by mouth every 4 hours as needed for mild pain        aspirin (ASA) 81 MG EC tablet Take 1 tablet (81 mg) by mouth daily Start tomorrow morning. 90 tablet 3     atorvastatin (LIPITOR) 40 MG tablet Take 1 tablet (40 mg) by mouth daily 90 tablet 3     calcium carbonate 600 mg-vitamin D 400 units (CALTRATE) 600-400 MG-UNIT per tablet Take 1 tablet by mouth 2 times daily       clopidogrel  "(PLAVIX) 75 MG tablet Take 1 tablet (75 mg) by mouth daily 90 tablet 3     Cyanocobalamin 2000 MCG TABS Take 2,000 mcg by mouth every 7 days On Sundays       digoxin (LANOXIN) 250 MCG tablet Take 1 tablet (250 mcg) by mouth daily 30 tablet 0     gabapentin (NEURONTIN) 300 MG capsule Take 2 capsules (600 mg) by mouth every evening 60 capsule 5     levofloxacin (LEVAQUIN) 750 MG tablet Take 1 tablet (750 mg) by mouth daily 3 tablet 0     loperamide (IMODIUM) 2 MG capsule Take 2 mg by mouth 4 times daily as needed for diarrhea       melatonin 1 MG TABS tablet Take 3 mg by mouth nightly as needed for sleep        metoprolol tartrate (LOPRESSOR) 25 MG tablet Take 0.5 tablets (12.5 mg) by mouth 2 times daily Hold for SBP<100 or HR<60 60 tablet 0     multivitamin (OCUVITE) TABS Take 1 tablet by mouth 2 times daily        nitroGLYcerin (NITROSTAT) 0.4 MG sublingual tablet For chest pain place 1 tablet under the tongue every 5 minutes for 3 doses. If symptoms persist 5 minutes after 1st dose call 911. 20 tablet 0     oxyCODONE (ROXICODONE) 5 MG tablet Take 0.5 tablets (2.5 mg) by mouth every 8 hours as needed for moderate to severe pain 15 tablet 0     polyethylene glycol (MIRALAX/GLYCOLAX) powder Take 1 capful by mouth daily as needed for constipation       potassium chloride ER 20 MEQ PO CR tablet Take 1 tablet (20 mEq) by mouth daily 90 tablet 3     Skin Protectants, Misc. (EUCERIN) cream Apply topically every hour as needed for dry skin 120 g 0     REVIEW OF SYSTEMS:  4 point ROS including Respiratory, CV, GI and , other than that noted in the HPI,  is negative    Objective:  BP 92/63   Pulse 91   Temp 98.8  F (37.1  C)   Resp 16   Ht 1.702 m (5' 7\")   Wt 67.2 kg (148 lb 3.2 oz)   PF 96 L/min   BMI 23.21 kg/m    Exam:  GENERAL APPEARANCE:  Alert, in no distress  ENT:  [place]  EYES:  EOM normal, conjunctiva and lids normal  RESP:  no respiratory distress  CV:  No LE  edema  M/S:   in bed. DAMIAN with good " strength  SKIN:  fading hematoma over groin, lower abdomen and upper thighs  PSYCH:  oriented X 3, memory impaired , affect and mood normal    Labs:   Recent labs in [facility] reviewed by me today.     ASSESSMENT/PLAN:  (D62) Anemia due to blood loss, acute  (primary encounter diagnosis)  (T14.8XXA) Hematoma  Comment: post cardiac catheterization, he developed acute bleeding from his left femoral access site with subsequent hemorrhagic shock. Received a total of 4 units PRBC and IV iron X 2. Hgb improved at 10.4 on 6/8/2020 . Hematoma is slowly resolving.   Plan: continue tylenol and oxycodone. CBC 6/15/2020.      (I25.10) Coronary artery disease involving native coronary artery of native heart without angina pectoris   (I35.0) Nonrheumatic aortic valve stenosis  Comment: s/p complex PCI with rotational atherectomy and 4 stents to the proximal to distal RCA on 5/18/2020. Awaiting TAVR after he recuperates.   Plan: continue ASA, Plavix. Continue metoprolol with parameters-see below. He was scheduled for Cardiology follow up 6/11/2020, but it appears this did not happen-will check with unit coordinator to reschedule.      (I48.20) Chronic a-fib  Comment: recent HR: 77-91. Almost every dose of metoprolol has been held for SBP in the 90s.   Plan: continue digoxin. Continue metoprolol with parameters. Hold Eliquis. Cardiology follow up as scheduled.      (J18.1) Pneumonia of both lower lobes due to infectious organism (H)  Comment: completed levaquin 6/7/2020 and infection appears resolved.   Plan: monitor respiratory status, VS. CBC  on 6/15/2020      (R13.12) Oropharyngeal dysphagia  Comment: video swallow done 5/31/2020 showed trace penetration with thin barium, no aspiration. Tolerating DD2 with thin liquids. Oral intake is fair to poor, in part due to the modified diet.   Plan: continue SPEECH THERAPY, advance diet as tolerated. Encourage fluids. BMP 6/15/2020       (I10) Benign essential hypertension  Comment:  metoprolol has been held for SBP in the 90s. Denies feeling dizzy or lightheaded.   Plan: continue metoprolol with parameters. Monitor VS. Avoid hypotension due to advanced age and fall risk.      Urinary retention  Comment: appears resolved. Scrotal edema has improved.   Plan: continue tamsulosin.  Urology follow up as scheduled.      (K50.751) Collagenous colitis  Comment: intermittent nausea, cramping and loose stools.   Plan: obtain specimen for C diff.      (G47.33) MEL (obstructive sleep apnea)  Comment: reports he has not used his CPAP for the past few months. States that his machine is a different type of set up and too complicated to have family bring to the facility.   Plan: closely monitor respiratory status and sleep.     (R53.81) Physical deconditioning  Comment: slowly progressing in therapies.   Plan: continue PHYSICAL THERAPY/OT. Goal is to return home with his wife and home care services.     Electronically signed by:  FABI Fuentes CNP         Sincerely,        FABI Fuentes CNP

## 2020-06-15 ENCOUNTER — NURSING HOME VISIT (OUTPATIENT)
Dept: GERIATRICS | Facility: CLINIC | Age: 75
End: 2020-06-15
Payer: MEDICARE

## 2020-06-15 ENCOUNTER — HOSPITAL LABORATORY (OUTPATIENT)
Dept: OTHER | Facility: CLINIC | Age: 75
End: 2020-06-15

## 2020-06-15 VITALS
DIASTOLIC BLOOD PRESSURE: 63 MMHG | BODY MASS INDEX: 22.63 KG/M2 | SYSTOLIC BLOOD PRESSURE: 96 MMHG | RESPIRATION RATE: 16 BRPM | HEIGHT: 67 IN | OXYGEN SATURATION: 92 % | TEMPERATURE: 98.7 F | WEIGHT: 144.2 LBS | HEART RATE: 102 BPM

## 2020-06-15 DIAGNOSIS — I35.0 NONRHEUMATIC AORTIC (VALVE) STENOSIS: ICD-10-CM

## 2020-06-15 DIAGNOSIS — R13.12 OROPHARYNGEAL DYSPHAGIA: ICD-10-CM

## 2020-06-15 DIAGNOSIS — J18.9 PNEUMONIA OF BOTH LOWER LOBES DUE TO INFECTIOUS ORGANISM: ICD-10-CM

## 2020-06-15 DIAGNOSIS — R33.9 URINARY RETENTION: ICD-10-CM

## 2020-06-15 DIAGNOSIS — R63.4 WEIGHT LOSS: ICD-10-CM

## 2020-06-15 DIAGNOSIS — I48.20 CHRONIC A-FIB (H): ICD-10-CM

## 2020-06-15 DIAGNOSIS — R53.81 PHYSICAL DECONDITIONING: ICD-10-CM

## 2020-06-15 DIAGNOSIS — D62 ANEMIA DUE TO BLOOD LOSS, ACUTE: Primary | ICD-10-CM

## 2020-06-15 DIAGNOSIS — T14.8XXA HEMATOMA: ICD-10-CM

## 2020-06-15 DIAGNOSIS — I10 BENIGN ESSENTIAL HYPERTENSION: ICD-10-CM

## 2020-06-15 DIAGNOSIS — K52.831 COLLAGENOUS COLITIS: ICD-10-CM

## 2020-06-15 DIAGNOSIS — I25.10 CORONARY ARTERY DISEASE INVOLVING NATIVE CORONARY ARTERY OF NATIVE HEART WITHOUT ANGINA PECTORIS: ICD-10-CM

## 2020-06-15 LAB
ANION GAP SERPL CALCULATED.3IONS-SCNC: 7 MMOL/L (ref 3–14)
BUN SERPL-MCNC: 17 MG/DL (ref 7–30)
CALCIUM SERPL-MCNC: 8.6 MG/DL (ref 8.5–10.1)
CHLORIDE SERPL-SCNC: 107 MMOL/L (ref 94–109)
CO2 SERPL-SCNC: 26 MMOL/L (ref 20–32)
CREAT SERPL-MCNC: 0.9 MG/DL (ref 0.66–1.25)
ERYTHROCYTE [DISTWIDTH] IN BLOOD BY AUTOMATED COUNT: 16.6 % (ref 10–15)
GFR SERPL CREATININE-BSD FRML MDRD: 82 ML/MIN/{1.73_M2}
GLUCOSE SERPL-MCNC: 74 MG/DL (ref 70–99)
HCT VFR BLD AUTO: 33.3 % (ref 40–53)
HGB BLD-MCNC: 10.5 G/DL (ref 13.3–17.7)
MCH RBC QN AUTO: 31.2 PG (ref 26.5–33)
MCHC RBC AUTO-ENTMCNC: 31.5 G/DL (ref 31.5–36.5)
MCV RBC AUTO: 99 FL (ref 78–100)
PLATELET # BLD AUTO: 488 10E9/L (ref 150–450)
POTASSIUM SERPL-SCNC: 3.4 MMOL/L (ref 3.4–5.3)
RBC # BLD AUTO: 3.37 10E12/L (ref 4.4–5.9)
SODIUM SERPL-SCNC: 140 MMOL/L (ref 133–144)
WBC # BLD AUTO: 14.6 10E9/L (ref 4–11)

## 2020-06-15 PROCEDURE — 99310 SBSQ NF CARE HIGH MDM 45: CPT | Performed by: NURSE PRACTITIONER

## 2020-06-15 ASSESSMENT — MIFFLIN-ST. JEOR: SCORE: 1342.72

## 2020-06-15 NOTE — LETTER
6/15/2020        RE: Efrem Ramos  [address] [facility] [address]        [place] [facility]  [facility] [facility] Record Number: [id number]  [facility] of [facility] took [facility] [[id number]]  Chief Complaint   Patient presents with     RECHECK       HPI:    Efrem Ramos is a 74 year old (1945), who is being seen today for an episodic care visit. HPI information obtained from: facility chart records, facility staff, patient report and [facility] chart review.   He came to this facility 6/4/2020 for short term rehab and medical management following hospitalization 5/18/2020 due to   hemorrhagic shock secondary to cath site bleeding and a massive hematoma of his left groin post elective  right coronary artery stenting X 4, in anticipation of TAVR. Eliquis had been held 3 days prior to the procedure. CTA abdomen/pelvis showed active extravasation from the left common femoral artery into the left groin with extensive hemorrhage. Vascular Surgery was consulted and hemostasis was achieved with direct US guided pressure. He required pressor support and received a total of 4 units of PRBC. IV iron was given X 2. Hgb stabilized in the mid 8s. Eliquis is on hold pending Cardiology follow up. ASA/Plavix continued given recent PCI. Follow up US 5/29/2020 showed no recurrent pseudoaneurysm, residual left groin hematoma was noted.   Urology consulted for scrotal edema and recommended support and elevation. Tamsulosin was started for urinary retention with high PVRs.   Emergent cardiac cath was done 5/28/2020 due to acute hypotension, leukocytosis, tachycardia and elevated lactate of 3.3.  Metoprolol was decreased and digoxin was started. He had transient tachycardia in the 120s with ambulation, HR improved at rest. SBP intermittently dropped to the 80-90s, but he remained asymptomatic.   CXR 5/29/2020 showed new bibasilar interstitial infiltrates suggesting  "infection or edema. Given elevated lactate and WBC 12.4, he was treated with vancomycin and ceftriaxone. Discharged on levaquin for 3 days. Blood cultures no growth. CT head was done 5/29/2020 for altered level of consciousness and showed diffuse cerebral volume loss and changes consistent with small vessel ischemic disease, no acute pathology. SPEECH THERAPY evaluated for dysphagia and recommended DD2 with thin liquids.   At discharge: WBC 12.0, Hgb 8.8, creatinine 0.91.     Today's concern is:     Anemia due to blood loss, acute-reports pain from the hematoma and scrotal edema continues to improve.   Hematoma  Coronary artery disease involving native coronary artery of native heart without angina pectoris  Nonrheumatic aortic (valve) stenosis  Chronic a-fib (H)-HR: 63-97   Weight loss-nurses report that he refused to eat over the weekend. Patient states that he doesn't feel well, but is unable to be more specific. Denies cough, shortness of breath or chest pain. Reports intermittent nausea, no vomiting.  He doesn't like the modified diet and reports the food is \"unappealing.\" He had loose stools last week and reports this has improved. C diff was negative on 6/12/2020. Denies abdominal pain. Afebrile.   Pneumonia of both lower lobes due to infectious organism  Oropharyngeal dysphagia  Benign essential hypertension-BPs: 115/73, 93/57, 95/61   Urinary retention-denies pain or burning.   Collagenous colitis  Physical deconditioning-ambulating short distances with walker and assist. Requires supervision to min assist with cares.   SLUMS 24/30     Past Medical and Surgical History reviewed in Epic today.    MEDICATIONS:    Current Outpatient Medications   Medication Sig Dispense Refill     acetaminophen (TYLENOL) 325 MG tablet Take 650 mg by mouth every 4 hours as needed for mild pain        aspirin (ASA) 81 MG EC tablet Take 1 tablet (81 mg) by mouth daily Start tomorrow morning. 90 tablet 3     atorvastatin (LIPITOR) " "40 MG tablet Take 1 tablet (40 mg) by mouth daily 90 tablet 3     calcium carbonate 600 mg-vitamin D 400 units (CALTRATE) 600-400 MG-UNIT per tablet Take 1 tablet by mouth 2 times daily       clopidogrel (PLAVIX) 75 MG tablet Take 1 tablet (75 mg) by mouth daily 90 tablet 3     Cyanocobalamin 2000 MCG TABS Take 2,000 mcg by mouth every 7 days On Sundays       digoxin (LANOXIN) 250 MCG tablet Take 1 tablet (250 mcg) by mouth daily 30 tablet 0     gabapentin (NEURONTIN) 300 MG capsule Take 2 capsules (600 mg) by mouth every evening 60 capsule 5     loperamide (IMODIUM) 2 MG capsule Take 2 mg by mouth 4 times daily as needed for diarrhea       melatonin 1 MG TABS tablet Take 3 mg by mouth nightly as needed for sleep        metoprolol tartrate (LOPRESSOR) 25 MG tablet Take 0.5 tablets (12.5 mg) by mouth 2 times daily Hold for SBP<100 or HR<60 60 tablet 0     multivitamin (OCUVITE) TABS Take 1 tablet by mouth 2 times daily        nitroGLYcerin (NITROSTAT) 0.4 MG sublingual tablet For chest pain place 1 tablet under the tongue every 5 minutes for 3 doses. If symptoms persist 5 minutes after 1st dose call 911. 20 tablet 0     oxyCODONE (ROXICODONE) 5 MG tablet Take 0.5 tablets (2.5 mg) by mouth every 8 hours as needed for moderate to severe pain 15 tablet 0     polyethylene glycol (MIRALAX/GLYCOLAX) powder Take 1 capful by mouth daily as needed for constipation       potassium chloride ER 20 MEQ PO CR tablet Take 1 tablet (20 mEq) by mouth daily 90 tablet 3     Skin Protectants, Misc. (EUCERIN) cream Apply topically every hour as needed for dry skin 120 g 0     REVIEW OF SYSTEMS:  10 point ROS of systems including Constitutional, Eyes, Respiratory, Cardiovascular, Gastroenterology, Genitourinary, Integumentary, Musculoskeletal, Psychiatric were all negative except for pertinent positives noted in my HPI.    Objective:  BP 96/63   Pulse 102   Temp 98.7  F (37.1  C)   Resp 16   Ht 1.702 m (5' 7\")   Wt 65.4 kg (144 lb " 3.2 oz)   SpO2 92%   BMI 22.58 kg/m    Exam:  GENERAL APPEARANCE:  Alert, in no distress  ENT:  [place]  EYES:  EOM normal, conjunctiva and lids normal  RESP:  no respiratory distress  CV:  no edema  ABDOMEN:  soft, non-tender, no distension, no masses  M/S:   in bed. DAMIAN with good strength  SKIN:  fading hematoma over groin, lower abdomen and upper thighs. No visible rashes or open areas  PSYCH:  oriented X 3, memory impaired , flat affect    Labs:   Recent labs in [facility] reviewed by me today.     ASSESSMENT/PLAN:  (D62) Anemia due to blood loss, acute  (primary encounter diagnosis)  (T14.8XXA) Hematoma  Comment: Hgb improved at 10.5. Hematoma is slowly resolving with less pain. Oxycodone hasn't been used in over a week.   Plan: continue tylenol, prn oxycodone for severe pain. Recheck labs 6/22/2020.     (I25.10) Coronary artery disease involving native coronary artery of native heart without angina pectoris  (I35.0) Nonrheumatic aortic (valve) stenosis  Comment: s/p complex PCI with rotational atherectomy and 4 stents to the proximal to distal RCA on 5/18/2020. Plan is for TAVR after he recuperates.   Plan: continue ASA, Plavix. Continue metoprolol with parameters-see below. He was scheduled for Cardiology follow up 6/11/2020, but it appears this did not happen and will need to be rescheduled.     (I48.20) Chronic a-fib (H)  Comment: rate controlled. Metoprolol has been held most doses for SBP <100. Question if digoxin is possibly contributing to his intermittent nausea and poor intake   Plan: continue digoxin. Continue metoprolol with parameters. Obtain digoxin level.     (R63.4) Weight loss   (R13.12) Oropharyngeal dysphagia  Comment: appetite continues to be poor,  in part due to his dislike of the modified diet. Weight is down 9 lbs from admission. Speech therapist reports that his swallow is ok, but his dentition is poor.  Dietician has offered different types of supplements, which he declines. Question if  depression may be contributing. He denies feeling depressed but admits that his home situation is difficult. He lives with his wife and their son, who is a hoarder. There is no room in the home for a walker and he eats his meals while sitting on the bed. [place], but his wife states that's not possible. He doesn't feel that there is enough space to have home care services, especially therapy.   Plan: change diet to DD3 with thin liquids to promote intake. SPEECH THERAPY will continue to advance diet as tolerated. Dietician will meet with him again re: supplements and food preferences. Trial of zofran 30 minutes prior to meals. Check digoxin level.  Refer to onsite psychologist for possible depression.  is involved.   Discussed with dietician, speech therapist, , nurse manager and floor nurse.     (J18.9) Pneumonia of both lower lobes due to infectious organism  Comment: infection appears resolved. He continues to have mild leukocytosis with WBC 14.6 today. This is consistent with WBC while inpatient. He remains afebrile,no hypoxia and no s/s of infection   Plan: monitor respiratory status. CBC with diff, BMP 6/22/2020.     (I10) Benign essential hypertension  Comment: soft BPs are unchanged, asymptomatic   Plan: continue metoprolol for rate control, with parameters. Monitor VS. Avoid hypotension due to advanced age and fall risk.    (R33.9) Urinary retention  Comment: scrotal edema has improved. PVRs were within range early in his tcu stay. Given elevated WBC and vague reports of not feeling well, urinary retention may be ongoing and UTI is a possibility   Plan: bladder scan for PVR x 3 and nurse to update me tomorrow. Monitor urinary symptoms. Continue tamsulosin.     (K52.831) Collagenous colitis  Comment: he had a few watery stools last week, resolved. C diff was negative 6/12/2020  Plan: monitor symptoms     (R53.81) Physical deconditioning  Comment: slow  progress in therapies   Plan: continue PHYSICAL THERAPY/OT. Disposition unclear at this time, but he will most likely return home with family.      Electronically signed by:  FABI Fuentes CNP         Sincerely,        FABI Fuentes CNP

## 2020-06-15 NOTE — PROGRESS NOTES
[place] [facility]  [place] [facility] Record Number: [id number]  [facility] of [facility] where encounter took place[facility] [[id number]]  Chief Complaint   Patient presents with     RECHECK       HPI:    Efrem Ramos is a 74 year old (1945), who is being seen today for an episodic care visit. HPI information obtained from: facility chart records, facility staff, patient report and [facility] chart review.   He came to this facility 6/4/2020 for short term rehab and medical management following hospitalization 5/18/2020 due to   hemorrhagic shock secondary to cath site bleeding and a massive hematoma of his left groin post elective  right coronary artery stenting X 4, in anticipation of TAVR. Eliquis had been held 3 days prior to the procedure. CTA abdomen/pelvis showed active extravasation from the left common femoral artery into the left groin with extensive hemorrhage. Vascular Surgery was consulted and hemostasis was achieved with direct US guided pressure. He required pressor support and received a total of 4 units of PRBC. IV iron was given X 2. Hgb stabilized in the mid 8s. Eliquis is on hold pending Cardiology follow up. ASA/Plavix continued given recent PCI. Follow up US 5/29/2020 showed no recurrent pseudoaneurysm, residual left groin hematoma was noted.   Urology consulted for scrotal edema and recommended support and elevation. Tamsulosin was started for urinary retention with high PVRs.   Emergent cardiac cath was done 5/28/2020 due to acute hypotension, leukocytosis, tachycardia and elevated lactate of 3.3.  Metoprolol was decreased and digoxin was started. He had transient tachycardia in the 120s with ambulation, HR improved at rest. SBP intermittently dropped to the 80-90s, but he remained asymptomatic.   CXR 5/29/2020 showed new bibasilar interstitial infiltrates suggesting infection or edema. Given elevated lactate and WBC 12.4, he was treated with vancomycin  "and ceftriaxone. Discharged on levaquin for 3 days. Blood cultures no growth. CT head was done 5/29/2020 for altered level of consciousness and showed diffuse cerebral volume loss and changes consistent with small vessel ischemic disease, no acute pathology. SPEECH THERAPY evaluated for dysphagia and recommended DD2 with thin liquids.   At discharge: WBC 12.0, Hgb 8.8, creatinine 0.91.     Today's concern is:     Anemia due to blood loss, acute-reports pain from the hematoma and scrotal edema continues to improve.   Hematoma  Coronary artery disease involving native coronary artery of native heart without angina pectoris  Nonrheumatic aortic (valve) stenosis  Chronic a-fib (H)-HR: 63-97   Weight loss-nurses report that he refused to eat over the weekend. Patient states that he doesn't feel well, but is unable to be more specific. Denies cough, shortness of breath or chest pain. Reports intermittent nausea, no vomiting.  He doesn't like the modified diet and reports the food is \"unappealing.\" He had loose stools last week and reports this has improved. C diff was negative on 6/12/2020. Denies abdominal pain. Afebrile.   Pneumonia of both lower lobes due to infectious organism  Oropharyngeal dysphagia  Benign essential hypertension-BPs: 115/73, 93/57, 95/61   Urinary retention-denies pain or burning.   Collagenous colitis  Physical deconditioning-ambulating short distances with walker and assist. Requires supervision to min assist with cares.   SLUMS 24/30     Past Medical and Surgical History reviewed in Epic today.    MEDICATIONS:    Current Outpatient Medications   Medication Sig Dispense Refill     acetaminophen (TYLENOL) 325 MG tablet Take 650 mg by mouth every 4 hours as needed for mild pain        aspirin (ASA) 81 MG EC tablet Take 1 tablet (81 mg) by mouth daily Start tomorrow morning. 90 tablet 3     atorvastatin (LIPITOR) 40 MG tablet Take 1 tablet (40 mg) by mouth daily 90 tablet 3     calcium carbonate 600 " "mg-vitamin D 400 units (CALTRATE) 600-400 MG-UNIT per tablet Take 1 tablet by mouth 2 times daily       clopidogrel (PLAVIX) 75 MG tablet Take 1 tablet (75 mg) by mouth daily 90 tablet 3     Cyanocobalamin 2000 MCG TABS Take 2,000 mcg by mouth every 7 days On Sundays       digoxin (LANOXIN) 250 MCG tablet Take 1 tablet (250 mcg) by mouth daily 30 tablet 0     gabapentin (NEURONTIN) 300 MG capsule Take 2 capsules (600 mg) by mouth every evening 60 capsule 5     loperamide (IMODIUM) 2 MG capsule Take 2 mg by mouth 4 times daily as needed for diarrhea       melatonin 1 MG TABS tablet Take 3 mg by mouth nightly as needed for sleep        metoprolol tartrate (LOPRESSOR) 25 MG tablet Take 0.5 tablets (12.5 mg) by mouth 2 times daily Hold for SBP<100 or HR<60 60 tablet 0     multivitamin (OCUVITE) TABS Take 1 tablet by mouth 2 times daily        nitroGLYcerin (NITROSTAT) 0.4 MG sublingual tablet For chest pain place 1 tablet under the tongue every 5 minutes for 3 doses. If symptoms persist 5 minutes after 1st dose call 911. 20 tablet 0     oxyCODONE (ROXICODONE) 5 MG tablet Take 0.5 tablets (2.5 mg) by mouth every 8 hours as needed for moderate to severe pain 15 tablet 0     polyethylene glycol (MIRALAX/GLYCOLAX) powder Take 1 capful by mouth daily as needed for constipation       potassium chloride ER 20 MEQ PO CR tablet Take 1 tablet (20 mEq) by mouth daily 90 tablet 3     Skin Protectants, Misc. (EUCERIN) cream Apply topically every hour as needed for dry skin 120 g 0     REVIEW OF SYSTEMS:  10 point ROS of systems including Constitutional, Eyes, Respiratory, Cardiovascular, Gastroenterology, Genitourinary, Integumentary, Musculoskeletal, Psychiatric were all negative except for pertinent positives noted in my HPI.    Objective:  BP 96/63   Pulse 102   Temp 98.7  F (37.1  C)   Resp 16   Ht 1.702 m (5' 7\")   Wt 65.4 kg (144 lb 3.2 oz)   SpO2 92%   BMI 22.58 kg/m    Exam:  GENERAL APPEARANCE:  Alert, in no " distress  ENT:  [place]  EYES:  EOM normal, conjunctiva and lids normal  RESP:  no respiratory distress  CV:  no edema  ABDOMEN:  soft, non-tender, no distension, no masses  M/S:   in bed. DAMIAN with good strength  SKIN:  fading hematoma over groin, lower abdomen and upper thighs. No visible rashes or open areas  PSYCH:  oriented X 3, memory impaired , flat affect    Labs:   Recent labs in [facility] reviewed by me today.     ASSESSMENT/PLAN:  (D62) Anemia due to blood loss, acute  (primary encounter diagnosis)  (T14.8XXA) Hematoma  Comment: Hgb improved at 10.5. Hematoma is slowly resolving with less pain. Oxycodone hasn't been used in over a week.   Plan: continue tylenol, prn oxycodone for severe pain. Recheck labs 6/22/2020.     (I25.10) Coronary artery disease involving native coronary artery of native heart without angina pectoris  (I35.0) Nonrheumatic aortic (valve) stenosis  Comment: s/p complex PCI with rotational atherectomy and 4 stents to the proximal to distal RCA on 5/18/2020. Plan is for TAVR after he recuperates.   Plan: continue ASA, Plavix. Continue metoprolol with parameters-see below. He was scheduled for Cardiology follow up 6/11/2020, but it appears this did not happen and will need to be rescheduled.     (I48.20) Chronic a-fib (H)  Comment: rate controlled. Metoprolol has been held most doses for SBP <100. Question if digoxin is possibly contributing to his intermittent nausea and poor intake   Plan: continue digoxin. Continue metoprolol with parameters. Obtain digoxin level.     (R63.4) Weight loss   (R13.12) Oropharyngeal dysphagia  Comment: appetite continues to be poor,  in part due to his dislike of the modified diet. Weight is down 9 lbs from admission. Speech therapist reports that his swallow is ok, but his dentition is poor.  Dietician has offered different types of supplements, which he declines. Question if depression may be contributing. He denies feeling depressed but admits that his home  situation is difficult. He lives with his wife and their son, who is a hoarder. There is no room in the home for a walker and he eats his meals while sitting on the bed. [place], but his wife states that's not possible. He doesn't feel that there is enough space to have home care services, especially therapy.   Plan: change diet to DD3 with thin liquids to promote intake. SPEECH THERAPY will continue to advance diet as tolerated. Dietician will meet with him again re: supplements and food preferences. Trial of zofran 30 minutes prior to meals. Check digoxin level.  Refer to onsite psychologist for possible depression.  is involved.   Discussed with dietician, speech therapist, , nurse manager and floor nurse.     (J18.9) Pneumonia of both lower lobes due to infectious organism  Comment: infection appears resolved. He continues to have mild leukocytosis with WBC 14.6 today. This is consistent with WBC while inpatient. He remains afebrile,no hypoxia and no s/s of infection   Plan: monitor respiratory status. CBC with diff, BMP 6/22/2020.     (I10) Benign essential hypertension  Comment: soft BPs are unchanged, asymptomatic   Plan: continue metoprolol for rate control, with parameters. Monitor VS. Avoid hypotension due to advanced age and fall risk.    (R33.9) Urinary retention  Comment: scrotal edema has improved. PVRs were within range early in his tcu stay. Given elevated WBC and vague reports of not feeling well, urinary retention may be ongoing and UTI is a possibility   Plan: bladder scan for PVR x 3 and nurse to update me tomorrow. Monitor urinary symptoms. Continue tamsulosin.     (K52.831) Collagenous colitis  Comment: he had a few watery stools last week, resolved. C diff was negative 6/12/2020  Plan: monitor symptoms     (R53.81) Physical deconditioning  Comment: slow progress in therapies   Plan: continue PHYSICAL THERAPY/OT. Disposition unclear at this  time, but he will most likely return home with family.      Electronically signed by:  FABI Fuentes CNP

## 2020-06-16 ENCOUNTER — HOSPITAL LABORATORY (OUTPATIENT)
Dept: OTHER | Facility: CLINIC | Age: 75
End: 2020-06-16

## 2020-06-16 LAB
ALBUMIN UR-MCNC: 30 MG/DL
APPEARANCE UR: CLEAR
BACTERIA #/AREA URNS HPF: ABNORMAL /HPF
BILIRUB UR QL STRIP: NEGATIVE
COLOR UR AUTO: YELLOW
GLUCOSE UR STRIP-MCNC: NEGATIVE MG/DL
HGB UR QL STRIP: NEGATIVE
HYALINE CASTS #/AREA URNS LPF: 1 /LPF (ref 0–2)
KETONES UR STRIP-MCNC: NEGATIVE MG/DL
LEUKOCYTE ESTERASE UR QL STRIP: NEGATIVE
MUCOUS THREADS #/AREA URNS LPF: PRESENT /LPF
NITRATE UR QL: NEGATIVE
PH UR STRIP: 6 PH (ref 5–7)
RBC #/AREA URNS AUTO: 1 /HPF (ref 0–2)
SOURCE: ABNORMAL
SP GR UR STRIP: 1.03 (ref 1–1.03)
SQUAMOUS #/AREA URNS AUTO: <1 /HPF (ref 0–1)
UROBILINOGEN UR STRIP-MCNC: NORMAL MG/DL (ref 0–2)
WBC #/AREA URNS AUTO: 2 /HPF (ref 0–5)

## 2020-06-16 RX ORDER — ONDANSETRON 4 MG/1
4 TABLET, ORALLY DISINTEGRATING ORAL 3 TIMES DAILY
COMMUNITY
End: 2020-07-09

## 2020-06-16 ASSESSMENT — MIFFLIN-ST. JEOR: SCORE: 1341.81

## 2020-06-17 ENCOUNTER — HOSPITAL LABORATORY (OUTPATIENT)
Dept: OTHER | Facility: CLINIC | Age: 75
End: 2020-06-17

## 2020-06-17 ENCOUNTER — TELEPHONE (OUTPATIENT)
Dept: CARDIOLOGY | Facility: CLINIC | Age: 75
End: 2020-06-17

## 2020-06-17 ENCOUNTER — NURSING HOME VISIT (OUTPATIENT)
Dept: GERIATRICS | Facility: CLINIC | Age: 75
End: 2020-06-17
Payer: MEDICARE

## 2020-06-17 VITALS
TEMPERATURE: 97.7 F | SYSTOLIC BLOOD PRESSURE: 98 MMHG | BODY MASS INDEX: 22.6 KG/M2 | HEIGHT: 67 IN | HEART RATE: 65 BPM | RESPIRATION RATE: 18 BRPM | DIASTOLIC BLOOD PRESSURE: 63 MMHG | WEIGHT: 144 LBS | OXYGEN SATURATION: 94 %

## 2020-06-17 DIAGNOSIS — F32.A DEPRESSION, UNSPECIFIED DEPRESSION TYPE: ICD-10-CM

## 2020-06-17 DIAGNOSIS — D62 ANEMIA DUE TO BLOOD LOSS, ACUTE: ICD-10-CM

## 2020-06-17 DIAGNOSIS — I25.10 CORONARY ARTERY DISEASE INVOLVING NATIVE CORONARY ARTERY OF NATIVE HEART WITHOUT ANGINA PECTORIS: ICD-10-CM

## 2020-06-17 DIAGNOSIS — R53.81 PHYSICAL DECONDITIONING: ICD-10-CM

## 2020-06-17 DIAGNOSIS — J18.9 PNEUMONIA OF BOTH LOWER LOBES DUE TO INFECTIOUS ORGANISM: ICD-10-CM

## 2020-06-17 DIAGNOSIS — I35.0 NONRHEUMATIC AORTIC (VALVE) STENOSIS: ICD-10-CM

## 2020-06-17 DIAGNOSIS — R33.9 URINARY RETENTION: ICD-10-CM

## 2020-06-17 DIAGNOSIS — R63.4 WEIGHT LOSS: Primary | ICD-10-CM

## 2020-06-17 DIAGNOSIS — T14.8XXA HEMATOMA: ICD-10-CM

## 2020-06-17 DIAGNOSIS — R13.12 OROPHARYNGEAL DYSPHAGIA: ICD-10-CM

## 2020-06-17 DIAGNOSIS — K52.831 COLLAGENOUS COLITIS: ICD-10-CM

## 2020-06-17 DIAGNOSIS — I10 BENIGN ESSENTIAL HYPERTENSION: ICD-10-CM

## 2020-06-17 DIAGNOSIS — I48.20 CHRONIC A-FIB (H): ICD-10-CM

## 2020-06-17 LAB — DIGOXIN SERPL-MCNC: 2 UG/L (ref 0.5–2)

## 2020-06-17 PROCEDURE — 99310 SBSQ NF CARE HIGH MDM 45: CPT | Performed by: NURSE PRACTITIONER

## 2020-06-17 RX ORDER — MIRTAZAPINE 7.5 MG/1
15 TABLET, FILM COATED ORAL AT BEDTIME
COMMUNITY
End: 2020-07-09 | Stop reason: DRUGHIGH

## 2020-06-17 RX ORDER — DIGOXIN 125 MCG
125 TABLET ORAL DAILY
COMMUNITY
End: 2020-07-09

## 2020-06-17 NOTE — LETTER
6/17/2020        RE: Efrem Ramos  [address] [facility] [address]        [place] [facility]  [place] Medical Record Number: [id number]  [facility] of [facility] took [facility] [[id number]]  Chief Complaint   Patient presents with     RECHECK       HPI:    Efrem Ramos is a 74 year old (1945), who is being seen today for an episodic care visit. HPI information obtained from: facility chart records, facility staff, patient report, [facility] chart review and family/first contact wife Mariajose Ramos report.   He came to this facility 6/4/2020 for short term rehab and medical management following hospitalization 5/18/2020 due to   hemorrhagic shock secondary to cath site bleeding and a massive hematoma of his left groin post elective  right coronary artery stenting X 4, in anticipation of TAVR. Eliquis had been held 3 days prior to the procedure. CTA abdomen/pelvis showed active extravasation from the left common femoral artery into the left groin with extensive hemorrhage. Vascular Surgery was consulted and hemostasis was achieved with direct US guided pressure. He required pressor support and received a total of 4 units of PRBC. IV iron was given X 2. Hgb stabilized in the mid 8s. Eliquis is on hold pending Cardiology follow up. ASA/Plavix continued given recent PCI. Follow up US 5/29/2020 showed no recurrent pseudoaneurysm, residual left groin hematoma was noted.   Urology consulted for scrotal edema and recommended support and elevation. Tamsulosin was started for urinary retention with high PVRs.   Emergent cardiac cath was done 5/28/2020 due to acute hypotension, leukocytosis, tachycardia and elevated lactate of 3.3.  Metoprolol was decreased and digoxin was started. He had transient tachycardia in the 120s with ambulation, HR improved at rest. SBP intermittently dropped to the 80-90s, but he remained asymptomatic.   CXR 5/29/2020 showed  "new bibasilar interstitial infiltrates suggesting infection or edema. Given elevated lactate and WBC 12.4, he was treated with vancomycin and ceftriaxone. Discharged on levaquin for 3 days. Blood cultures no growth. CT head was done 5/29/2020 for altered level of consciousness and showed diffuse cerebral volume loss and changes consistent with small vessel ischemic disease, no acute pathology. SPEECH THERAPY evaluated for dysphagia and recommended DD2 with thin liquids.   At discharge: WBC 12.0, Hgb 8.8, creatinine 0.91.       Today's concern is:     Weight loss-appetite remains very poor and weight is down 11 lbs from tcu admission. His diet was advanced to DD3, but he continues to refuse to eat and drink, stating \"I'm don't know why. I just don't feel like it.\" Zofran was started for nausea and he's not sure if this has been helpful. No vomiting or abdominal pain. Occasional loose stool, which is related to his colitis and typical for him.   Oropharyngeal dysphagia  Anemia due to blood loss, acute  Hematoma  Coronary artery disease involving native coronary artery of native heart without angina pectoris  Nonrheumatic aortic (valve) stenosis  Chronic a-fib (H)-HR: 65-83, outlier 102  Benign essential hypertension-BPs: 98/63, 104/68, 97/61, 105/72. Denies feeling dizzy or lightheaded.   Urinary retention-he urinated in small amounts on the pm shift yesterday and was straight cathed for 412 ml. PVRs today are 112 ml and 0 ml.   Collagenous colitis  Pneumonia of both lower lobes due to infectious organism-denies cough, shortness of breath or chest pain. Afebrile. No hypoxia.   Physical deconditioning-ambulating 155 ft with walker and stand  by to contact guard assist. Requires contact guard to max assist with cares.     Past Medical and Surgical History reviewed in Epic today.    MEDICATIONS:    Current Outpatient Medications   Medication Sig Dispense Refill     digoxin (LANOXIN) 125 MCG tablet Take 125 mcg by mouth " daily       mirtazapine (REMERON) 7.5 MG tablet Take 7.5 mg by mouth At Bedtime       acetaminophen (TYLENOL) 325 MG tablet Take 650 mg by mouth every 4 hours as needed for mild pain        aspirin (ASA) 81 MG EC tablet Take 1 tablet (81 mg) by mouth daily Start tomorrow morning. 90 tablet 3     atorvastatin (LIPITOR) 40 MG tablet Take 1 tablet (40 mg) by mouth daily 90 tablet 3     calcium carbonate 600 mg-vitamin D 400 units (CALTRATE) 600-400 MG-UNIT per tablet Take 1 tablet by mouth 2 times daily       clopidogrel (PLAVIX) 75 MG tablet Take 1 tablet (75 mg) by mouth daily 90 tablet 3     Cyanocobalamin 2000 MCG TABS Take 2,000 mcg by mouth every 7 days On Sundays       gabapentin (NEURONTIN) 300 MG capsule Take 2 capsules (600 mg) by mouth every evening 60 capsule 5     loperamide (IMODIUM) 2 MG capsule Take 2 mg by mouth 4 times daily as needed for diarrhea       melatonin 1 MG TABS tablet Take 3 mg by mouth nightly as needed for sleep        metoprolol tartrate (LOPRESSOR) 25 MG tablet Take 0.5 tablets (12.5 mg) by mouth 2 times daily Hold for SBP<100 or HR<60 60 tablet 0     multivitamin (OCUVITE) TABS Take 1 tablet by mouth 2 times daily        nitroGLYcerin (NITROSTAT) 0.4 MG sublingual tablet For chest pain place 1 tablet under the tongue every 5 minutes for 3 doses. If symptoms persist 5 minutes after 1st dose call 911. 20 tablet 0     ondansetron (ZOFRAN-ODT) 4 MG ODT tab Take 4 mg by mouth 3 times daily Prior to meals       oxyCODONE (ROXICODONE) 5 MG tablet Take 0.5 tablets (2.5 mg) by mouth every 8 hours as needed for moderate to severe pain 15 tablet 0     polyethylene glycol (MIRALAX/GLYCOLAX) powder Take 1 capful by mouth daily as needed for constipation       potassium chloride ER 20 MEQ PO CR tablet Take 1 tablet (20 mEq) by mouth daily 90 tablet 3     Skin Protectants, Misc. (EUCERIN) cream Apply topically every hour as needed for dry skin 120 g 0     REVIEW OF SYSTEMS:  10 point ROS of systems  "including Constitutional, Eyes, Respiratory, Cardiovascular, Gastroenterology, Genitourinary, Integumentary, Musculoskeletal, Psychiatric were all negative except for pertinent positives noted in my HPI.    Objective:  BP 98/63   Pulse 65   Temp 97.7  F (36.5  C)   Resp 18   Ht 1.702 m (5' 7\")   Wt 65.3 kg (144 lb)   SpO2 94%   BMI 22.55 kg/m    Exam:  GENERAL APPEARANCE:  Alert, in no distress, thin  ENT:  [place]  EYES:  EOM normal, conjunctiva and lids normal  RESP:  no respiratory distress  CV:  no edema  ABDOMEN:  soft, non-tender, no distension, no masses  M/S:   gait slow with walker. DAMIAN with good strength  SKIN:  fading hematoma over lower abdomen and groin. Mild scrotal edema with areas of induration along the left side and lower left pelvis, no warmth or tenderness  PSYCH:  oriented X 3, insight and judgement impaired, memory impaired , flat affect    Labs:   Recent labs in [facility] reviewed by me today.     ASSESSMENT/PLAN:  (R63.4) Weight loss  (primary encounter diagnosis)  (R13.12) Oropharyngeal dysphagia  Comment: ongoing weight loss with refusal to eat and drink. This seems to be most likely related to depression. No s/s of infection or an acute process. Speech therapist has advanced his diet to DD3 to promote intake.   Plan: continue SPEECH THERAPY, advance diet as tolerated. Dietician is involved, continue nutritional supplements. Give 500 ml IVF today due to poor fluid intake. Continue zofran prior to meals.     Depression  Comment: mood is flat and he's poorly motivated to eat, participate in self cares and therapies. He admits to feeling depressed and talked with the psychologist yesterday, but is  unsure if this was helpful. He would prefer to stay at the facility or move to an AL. He and his wife live in the same house they bought when they were in their  20s and their son lives with them. 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 There is no room in the house for patient to use a walker and he " and his wife live in separate areas of the house. Per wife, AL is not an option at this time. We discussed a trial of mirtazapine to help with mood an appetite and he is willing to give it a try.   Plan: start mirtazapine 7.5 mg HS. Onsite psychologist will continue to follow.     (D62) Anemia due to blood loss, acute  (T14.8XXA) Hematoma  Comment: hematoma resolving. There's mild induration along the left lower pelvis and scrotum, but the area does not appear infected. Pain has improved and he hasn't used oxycodone for several days.   Plan: continue tylenol, prn oxycodone for severe pain. CBC 6/22/2020    (I25.10) Coronary artery disease involving native coronary artery of native heart without angina pectoris  (I35.0) Nonrheumatic aortic (valve) stenosis  Comment: s/p complex PCI with rotational atherectomy and 4 stents to the proximal to distal RCA on 5/18/2020. Plan is for TAVR in the future, but his functional status and weight will need to improve.   Plan: continue ASA, Plavix. Continue metoprolol with parameters.     (I48.20) Chronic a-fib (H)  Comment: rate controlled. Digoxin level is 2.0 today, ? If digoxin is contributing to poor intake and intermittent nausea.   Plan: discussed with Pedro Grimes NP with Cardiology, who was concerned about patient's mood and ability to rehab while he was inpatient. Will decrease digoxin to 125 mcg daily and closely monitor VS. Continue metoprolol with parameters. Patient's wife cancelled the 6/11/2020 Cardiology follow up and this will be rescheduled. Eliquis remains on hold.     (I10) Benign essential hypertension  Comment: soft BPs are unchanged, asymptomatic   Plan: continue metoprolol for rate control, with parameters. Monitor VS. Avoid hypotension due to advanced age and fall risk.    (R33.9) Urinary retention  Comment: he had difficulty urinating last night and was straight cathed for 412 ml. PVRs today: 112 ml and 0 ml. UA was sent last night and shows few  bacteria, 30 protein and mucous, otherwise normal.   Plan: bladder scan for PVR q shift and straight cath if >350 ml. Encourage fluids and activity. Continue tamsulosin. Urology follow up as scheduled 6/29/2020.     (K52.831) Collagenous colitis  Comment: no recent diarrhea. C diff was negative 6/12/2020  Plan: monitor     (J18.9) Pneumonia of both lower lobes due to infectious organism  Comment: infection appears resolved. Completed levaquin 6/7/2020. He continues to have mild leukocytosis with WBC 14.6, similar to results while inpatient. He remains afebrile,no hypoxia and no s/s of infection   Plan: monitor respiratory status. CBC with diff, BMP 6/22/2020.     (R53.81) Physical deconditioning  Comment: slowly progressing in therapies   Plan: continue PHYSICAL THERAPY/OT. Goal is to return home with family and hopefully they will agree to home care services.       Total time spent with patient visit at the skilled nursing facility was 55  min including patient visit, review of past records, phone call to patient contact and coordinating care with Cardiology. Greater than 50% of total time spent with counseling and coordinating care due to poor appetite, weight loss and general decline in status. Counseling patient and his wife re: depression, potential benefit vs risk of mirtazapine and concerns re: ability to manage at home, necessary follow up labs and specialty appts . Coordinating care with facility staff re: medication changes as noted above, monitoring PVRs, starting IVF, supervising intake at meals were discussed with nurses,  need for follow up specialty appts discussed with unit coordinator.       Electronically signed by:  FABI Fuentes CNP         Sincerely,        FABI Fuentes CNP

## 2020-06-17 NOTE — PROGRESS NOTES
[place] [facility]  [place] Medical Record Number: [id number]  [facility] of Service where encounter took place[facility] [[id number]]  Chief Complaint   Patient presents with     RECHECK       HPI:    Efrem Ramos is a 74 year old (1945), who is being seen today for an episodic care visit. HPI information obtained from: facility chart records, facility staff, patient report, [facility] chart review and family/first contact wife Mariajose Ramos report.   He came to this facility 6/4/2020 for short term rehab and medical management following hospitalization 5/18/2020 due to   hemorrhagic shock secondary to cath site bleeding and a massive hematoma of his left groin post elective  right coronary artery stenting X 4, in anticipation of TAVR. Eliquis had been held 3 days prior to the procedure. CTA abdomen/pelvis showed active extravasation from the left common femoral artery into the left groin with extensive hemorrhage. Vascular Surgery was consulted and hemostasis was achieved with direct US guided pressure. He required pressor support and received a total of 4 units of PRBC. IV iron was given X 2. Hgb stabilized in the mid 8s. Eliquis is on hold pending Cardiology follow up. ASA/Plavix continued given recent PCI. Follow up US 5/29/2020 showed no recurrent pseudoaneurysm, residual left groin hematoma was noted.   Urology consulted for scrotal edema and recommended support and elevation. Tamsulosin was started for urinary retention with high PVRs.   Emergent cardiac cath was done 5/28/2020 due to acute hypotension, leukocytosis, tachycardia and elevated lactate of 3.3.  Metoprolol was decreased and digoxin was started. He had transient tachycardia in the 120s with ambulation, HR improved at rest. SBP intermittently dropped to the 80-90s, but he remained asymptomatic.   CXR 5/29/2020 showed new bibasilar interstitial infiltrates suggesting infection or edema. Given elevated  "lactate and WBC 12.4, he was treated with vancomycin and ceftriaxone. Discharged on levaquin for 3 days. Blood cultures no growth. CT head was done 5/29/2020 for altered level of consciousness and showed diffuse cerebral volume loss and changes consistent with small vessel ischemic disease, no acute pathology. SPEECH THERAPY evaluated for dysphagia and recommended DD2 with thin liquids.   At discharge: WBC 12.0, Hgb 8.8, creatinine 0.91.       Today's concern is:     Weight loss-appetite remains very poor and weight is down 11 lbs from tcu admission. His diet was advanced to DD3, but he continues to refuse to eat and drink, stating \"I'm don't know why. I just don't feel like it.\" Zofran was started for nausea and he's not sure if this has been helpful. No vomiting or abdominal pain. Occasional loose stool, which is related to his colitis and typical for him.   Oropharyngeal dysphagia  Anemia due to blood loss, acute  Hematoma  Coronary artery disease involving native coronary artery of native heart without angina pectoris  Nonrheumatic aortic (valve) stenosis  Chronic a-fib (H)-HR: 65-83, outlier 102  Benign essential hypertension-BPs: 98/63, 104/68, 97/61, 105/72. Denies feeling dizzy or lightheaded.   Urinary retention-he urinated in small amounts on the pm shift yesterday and was straight cathed for 412 ml. PVRs today are 112 ml and 0 ml.   Collagenous colitis  Pneumonia of both lower lobes due to infectious organism-denies cough, shortness of breath or chest pain. Afebrile. No hypoxia.   Physical deconditioning-ambulating 155 ft with walker and stand  by to contact guard assist. Requires contact guard to max assist with cares.     Past Medical and Surgical History reviewed in Epic today.    MEDICATIONS:    Current Outpatient Medications   Medication Sig Dispense Refill     digoxin (LANOXIN) 125 MCG tablet Take 125 mcg by mouth daily       mirtazapine (REMERON) 7.5 MG tablet Take 7.5 mg by mouth At Bedtime       " acetaminophen (TYLENOL) 325 MG tablet Take 650 mg by mouth every 4 hours as needed for mild pain        aspirin (ASA) 81 MG EC tablet Take 1 tablet (81 mg) by mouth daily Start tomorrow morning. 90 tablet 3     atorvastatin (LIPITOR) 40 MG tablet Take 1 tablet (40 mg) by mouth daily 90 tablet 3     calcium carbonate 600 mg-vitamin D 400 units (CALTRATE) 600-400 MG-UNIT per tablet Take 1 tablet by mouth 2 times daily       clopidogrel (PLAVIX) 75 MG tablet Take 1 tablet (75 mg) by mouth daily 90 tablet 3     Cyanocobalamin 2000 MCG TABS Take 2,000 mcg by mouth every 7 days On Sundays       gabapentin (NEURONTIN) 300 MG capsule Take 2 capsules (600 mg) by mouth every evening 60 capsule 5     loperamide (IMODIUM) 2 MG capsule Take 2 mg by mouth 4 times daily as needed for diarrhea       melatonin 1 MG TABS tablet Take 3 mg by mouth nightly as needed for sleep        metoprolol tartrate (LOPRESSOR) 25 MG tablet Take 0.5 tablets (12.5 mg) by mouth 2 times daily Hold for SBP<100 or HR<60 60 tablet 0     multivitamin (OCUVITE) TABS Take 1 tablet by mouth 2 times daily        nitroGLYcerin (NITROSTAT) 0.4 MG sublingual tablet For chest pain place 1 tablet under the tongue every 5 minutes for 3 doses. If symptoms persist 5 minutes after 1st dose call 911. 20 tablet 0     ondansetron (ZOFRAN-ODT) 4 MG ODT tab Take 4 mg by mouth 3 times daily Prior to meals       oxyCODONE (ROXICODONE) 5 MG tablet Take 0.5 tablets (2.5 mg) by mouth every 8 hours as needed for moderate to severe pain 15 tablet 0     polyethylene glycol (MIRALAX/GLYCOLAX) powder Take 1 capful by mouth daily as needed for constipation       potassium chloride ER 20 MEQ PO CR tablet Take 1 tablet (20 mEq) by mouth daily 90 tablet 3     Skin Protectants, Misc. (EUCERIN) cream Apply topically every hour as needed for dry skin 120 g 0     REVIEW OF SYSTEMS:  10 point ROS of systems including Constitutional, Eyes, Respiratory, Cardiovascular, Gastroenterology,  "Genitourinary, Integumentary, Musculoskeletal, Psychiatric were all negative except for pertinent positives noted in my HPI.    Objective:  BP 98/63   Pulse 65   Temp 97.7  F (36.5  C)   Resp 18   Ht 1.702 m (5' 7\")   Wt 65.3 kg (144 lb)   SpO2 94%   BMI 22.55 kg/m    Exam:  GENERAL APPEARANCE:  Alert, in no distress, thin  ENT:  [place]  EYES:  EOM normal, conjunctiva and lids normal  RESP:  no respiratory distress  CV:  no edema  ABDOMEN:  soft, non-tender, no distension, no masses  M/S:   gait slow with walker. DAMIAN with good strength  SKIN:  fading hematoma over lower abdomen and groin. Mild scrotal edema with areas of induration along the left side and lower left pelvis, no warmth or tenderness  PSYCH:  oriented X 3, insight and judgement impaired, memory impaired , flat affect    Labs:   Recent labs in [facility] reviewed by me today.     ASSESSMENT/PLAN:  (R63.4) Weight loss  (primary encounter diagnosis)  (R13.12) Oropharyngeal dysphagia  Comment: ongoing weight loss with refusal to eat and drink. This seems to be most likely related to depression. No s/s of infection or an acute process. Speech therapist has advanced his diet to DD3 to promote intake.   Plan: continue SPEECH THERAPY, advance diet as tolerated. Dietician is involved, continue nutritional supplements. Give 500 ml IVF today due to poor fluid intake. Continue zofran prior to meals.     Depression  Comment: mood is flat and he's poorly motivated to eat, participate in self cares and therapies. He admits to feeling depressed and talked with the psychologist yesterday, but is  unsure if this was helpful. He would prefer to stay at the facility or move to an AL. He and his wife live in the same house they bought when they were in their  20s and their son lives with them. 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 There is no room in the house for patient to use a walker and he and his wife live in separate areas of the house. Per wife, AL is not an " option at this time. We discussed a trial of mirtazapine to help with mood an appetite and he is willing to give it a try.   Plan: start mirtazapine 7.5 mg HS. Onsite psychologist will continue to follow.     (D62) Anemia due to blood loss, acute  (T14.8XXA) Hematoma  Comment: hematoma resolving. There's mild induration along the left lower pelvis and scrotum, but the area does not appear infected. Pain has improved and he hasn't used oxycodone for several days.   Plan: continue tylenol, prn oxycodone for severe pain. CBC 6/22/2020    (I25.10) Coronary artery disease involving native coronary artery of native heart without angina pectoris  (I35.0) Nonrheumatic aortic (valve) stenosis  Comment: s/p complex PCI with rotational atherectomy and 4 stents to the proximal to distal RCA on 5/18/2020. Plan is for TAVR in the future, but his functional status and weight will need to improve.   Plan: continue ASA, Plavix. Continue metoprolol with parameters.     (I48.20) Chronic a-fib (H)  Comment: rate controlled. Digoxin level is 2.0 today, ? If digoxin is contributing to poor intake and intermittent nausea.   Plan: discussed with Pedro Grimes NP with Cardiology, who was concerned about patient's mood and ability to rehab while he was inpatient. Will decrease digoxin to 125 mcg daily and closely monitor VS. Continue metoprolol with parameters. Patient's wife cancelled the 6/11/2020 Cardiology follow up and this will be rescheduled. Eliquis remains on hold.     (I10) Benign essential hypertension  Comment: soft BPs are unchanged, asymptomatic   Plan: continue metoprolol for rate control, with parameters. Monitor VS. Avoid hypotension due to advanced age and fall risk.    (R33.9) Urinary retention  Comment: he had difficulty urinating last night and was straight cathed for 412 ml. PVRs today: 112 ml and 0 ml. UA was sent last night and shows few bacteria, 30 protein and mucous, otherwise normal.   Plan: bladder scan for  PVR q shift and straight cath if >350 ml. Encourage fluids and activity. Continue tamsulosin. Urology follow up as scheduled 6/29/2020.     (K52.831) Collagenous colitis  Comment: no recent diarrhea. C diff was negative 6/12/2020  Plan: monitor     (J18.9) Pneumonia of both lower lobes due to infectious organism  Comment: infection appears resolved. Completed levaquin 6/7/2020. He continues to have mild leukocytosis with WBC 14.6, similar to results while inpatient. He remains afebrile,no hypoxia and no s/s of infection   Plan: monitor respiratory status. CBC with diff, BMP 6/22/2020.     (R53.81) Physical deconditioning  Comment: slowly progressing in therapies   Plan: continue PHYSICAL THERAPY/OT. Goal is to return home with family and hopefully they will agree to home care services.       Total time spent with patient visit at the skilled nursing facility was 55  min including patient visit, review of past records, phone call to patient contact and coordinating care with Cardiology. Greater than 50% of total time spent with counseling and coordinating care due to poor appetite, weight loss and general decline in status. Counseling patient and his wife re: depression, potential benefit vs risk of mirtazapine and concerns re: ability to manage at home, necessary follow up labs and specialty appts . Coordinating care with facility staff re: medication changes as noted above, monitoring PVRs, starting IVF, supervising intake at meals were discussed with nurses,  need for follow up specialty appts discussed with unit coordinator.       Electronically signed by:  FABI Fuentes CNP

## 2020-06-18 NOTE — PROGRESS NOTES
[facility]  [facility] Record Number: [id number]  [facility] of Service where encounter took place[facility] [[id number]]  Chief Complaint   Patient presents with     RECHECK       HPI:    Efrem Ramos is a 74 year old (1945), who is being seen today for an episodic care visit. HPI information obtained from: facility chart records, facility staff, patient report and [facility] chart review.   He came to this facility 6/4/2020 for short term rehab and medical management following hospitalization 5/18/2020 due to   hemorrhagic shock secondary to cath site bleeding and a massive hematoma of his left groin post elective  right coronary artery stenting X 4, in anticipation of TAVR. Eliquis had been held 3 days prior to the procedure. CTA abdomen/pelvis showed active extravasation from the left common femoral artery into the left groin with extensive hemorrhage. Vascular Surgery was consulted and hemostasis was achieved with direct US guided pressure. He required pressor support and received a total of 4 units of PRBC. IV iron was given X 2. Hgb stabilized in the mid 8s. Eliquis is on hold pending Cardiology follow up. ASA/Plavix continued given recent PCI. Follow up US 5/29/2020 showed no recurrent pseudoaneurysm, residual left groin hematoma was noted.   Urology consulted for scrotal edema and recommended support and elevation. Tamsulosin was started for urinary retention with high PVRs.   Emergent cardiac cath was done 5/28/2020 due to acute hypotension, leukocytosis, tachycardia and elevated lactate of 3.3.  Metoprolol was decreased and digoxin was started. He had transient tachycardia in the 120s with ambulation, HR improved at rest. SBP intermittently dropped to the 80-90s, but he remained asymptomatic.   CXR 5/29/2020 showed new bibasilar interstitial infiltrates suggesting infection or edema. Given elevated lactate and WBC 12.4, he was treated with vancomycin  and ceftriaxone. Discharged on levaquin for 3 days. Blood cultures no growth. CT head was done 5/29/2020 for altered level of consciousness and showed diffuse cerebral volume loss and changes consistent with small vessel ischemic disease, no acute pathology. SPEECH THERAPY evaluated for dysphagia and recommended DD2 with thin liquids.   At discharge: WBC 12.0, Hgb 8.8, creatinine 0.91.     Today's concern is:     Weight loss-mirtazapine was started 6/17/2020 for poor appetite and depression. He reports his appetite remains poor, but he's trying to drink supplements. Nurse reports he's been more alert and interactive today.   Oropharyngeal dysphagia  Depression, unspecified depression type  Anemia due to blood loss, acute  Hematoma  Coronary artery disease involving native coronary artery of native heart without angina pectoris  Nonrheumatic aortic (valve) stenosis  Chronic a-fib (H)-HR: 62-94   Benign essential hypertension-BPs: 93/61, 118/75, 82/54  Urinary retention-feels that he's emptying his bladder. PVR was 187 today. No pain or burning with urination.   Collagenous colitis  Pneumonia of both lower lobes due to infectious organism-denies cough,shortness of breath or chest pain   Physical deconditioning-ambulating 155 ft with walker and stand  by to contact guard assist. Requires contact guard to max assist with cares.     Past Medical and Surgical History reviewed in Epic today.    MEDICATIONS:    Current Outpatient Medications   Medication Sig Dispense Refill     acetaminophen (TYLENOL) 325 MG tablet Take 650 mg by mouth every 4 hours as needed for mild pain        aspirin (ASA) 81 MG EC tablet Take 1 tablet (81 mg) by mouth daily Start tomorrow morning. 90 tablet 3     atorvastatin (LIPITOR) 40 MG tablet Take 1 tablet (40 mg) by mouth daily 90 tablet 3     calcium carbonate 600 mg-vitamin D 400 units (CALTRATE) 600-400 MG-UNIT per tablet Take 1 tablet by mouth 2 times daily       clopidogrel (PLAVIX) 75 MG  "tablet Take 1 tablet (75 mg) by mouth daily 90 tablet 3     Cyanocobalamin 2000 MCG TABS Take 2,000 mcg by mouth every 7 days On Sundays       digoxin (LANOXIN) 125 MCG tablet Take 125 mcg by mouth daily       gabapentin (NEURONTIN) 300 MG capsule Take 2 capsules (600 mg) by mouth every evening 60 capsule 5     loperamide (IMODIUM) 2 MG capsule Take 2 mg by mouth 4 times daily as needed for diarrhea       melatonin 1 MG TABS tablet Take 3 mg by mouth nightly as needed for sleep        metoprolol tartrate (LOPRESSOR) 25 MG tablet Take 0.5 tablets (12.5 mg) by mouth 2 times daily Hold for SBP<100 or HR<60 60 tablet 0     mirtazapine (REMERON) 7.5 MG tablet Take 7.5 mg by mouth At Bedtime       multivitamin (OCUVITE) TABS Take 1 tablet by mouth 2 times daily        nitroGLYcerin (NITROSTAT) 0.4 MG sublingual tablet For chest pain place 1 tablet under the tongue every 5 minutes for 3 doses. If symptoms persist 5 minutes after 1st dose call 911. 20 tablet 0     ondansetron (ZOFRAN-ODT) 4 MG ODT tab Take 4 mg by mouth 3 times daily Prior to meals       oxyCODONE (ROXICODONE) 5 MG tablet Take 0.5 tablets (2.5 mg) by mouth every 8 hours as needed for moderate to severe pain 15 tablet 0     polyethylene glycol (MIRALAX/GLYCOLAX) powder Take 1 capful by mouth daily as needed for constipation       potassium chloride ER 20 MEQ PO CR tablet Take 1 tablet (20 mEq) by mouth daily 90 tablet 3     Skin Protectants, Misc. (EUCERIN) cream Apply topically every hour as needed for dry skin 120 g 0     REVIEW OF SYSTEMS:  4 point ROS including Respiratory, CV, GI and , other than that noted in the HPI,  is negative    Objective:  BP 93/61   Pulse 62   Temp 98.9  F (37.2  C)   Resp 16   Ht 1.702 m (5' 7\")   Wt 65.4 kg (144 lb 3.2 oz)   SpO2 94%   BMI 22.58 kg/m    Exam:  GENERAL APPEARANCE:  Alert, in no distress  ENT:  [place]  EYES:  EOM normal, conjunctiva and lids normal  RESP:  no respiratory distress  CV:  no edema  ABDOMEN:  " mild scrotal edema with areas of induration, no warmth or erythema, no tenderness, soft, non-tender, no distension, no masses  M/S:   in bed. DAMIAN with good strength  SKIN:  fading hematoma over lower abdomen and groin. No rashes or open areas  PSYCH:  oriented X 3, insight and judgement impaired, memory impaired , affect and mood normal    Labs:   Recent labs in [facility] reviewed by me today.     ASSESSMENT/PLAN:  (R63.4) Weight loss  (primary encounter diagnosis)  (R13.12) Oropharyngeal dysphagia  Comment: received 500 ml of IVF 6/17/2020 for poor intake and mild dehydration. Diet was advanced to DD3 to promote oral intake. Refusal to eat and drink is most is most likely related to depression.   Plan: continue SPEECH THERAPY, advance diet as tolerated. Dietician is involved, continue nutritional supplements.  Continue zofran prior to meals. Continue mirtazapine.     (F32.9) Depression, unspecified depression type  Comment: mirtazapine was started 6/17/2020 for poor intake, poor motivation and depression. Onsite psychologist is involved.   Plan: continue mirtazapine.     (D62) Anemia due to blood loss, acute  (T14.8XXA) Hematoma  Comment: hematoma slowly resolving. Scrotal edema is improving, no signs of infection or abscess. Pain improved.   Plan: continue tylenol, prn oxycodone for severe pain. CBC 6/22/2020    (I25.10) Coronary artery disease involving native coronary artery of native heart without angina pectoris  (I35.0) Nonrheumatic aortic (valve) stenosis  Comment: s/p complex PCI with rotational atherectomy and 4 stents to the proximal to distal RCA on 5/18/2020. Plan is for TAVR in the future, but his functional status and weight will need to improve.   Plan: continue ASA, Plavix. Continue metoprolol with parameters.    (I48.20) Chronic a-fib (H)  Comment: rate remains controlled. Digoxin level was 2.0 on 6/17/2020 and dose was decreased 6/17/2020 after consulting  with Pedro Grimes NP with Cardiology.    Plan: continue digoxin. Continue metoprolol with parameters. Eliquis remains on hold. Patient's wife cancelled the 6/11/2020 Cardiology follow up and this will be rescheduled    (I10) Benign essential hypertension  Comment: BPs remain soft, but asymptomatic.   Plan: continue metoprolol for rate control, with parameters. Monitor VS. Avoid hypotension due to advanced age and fall risk.    (R33.9) Urinary retention  Comment: he required straight cath 6/16/2020 for 412 ml. PVRs have been acceptable since that time. UA 6/16/2020 negative.   Plan: continue tamsulosin. Bladder scan for PVR q shift and straight cath if >350 ml. Encourage fluids and activity. Urology follow up as scheduled 6/29/2020.     (K52.831) Collagenous colitis  Comment: no recent diarrhea or acute GI symptoms C diff was negative 6/12/2020  Plan: monitor     (J18.9) Pneumonia of both lower lobes due to infectious organism  Comment: infection appears resolved. Completed levaquin 6/7/2020. He continues to have mild leukocytosis with WBC 14.6, similar to results while inpatient. He remains afebrile,no hypoxia and no s/s of infection   Plan: monitor respiratory status. CBC with diff, BMP 6/22/2020    (R53.81) Physical deconditioning  Comment: slowly progressing in therapies   Plan: continue PHYSICAL THERAPY/OT. Goal is to return home with family and hopefully they will agree to home care services.       Electronically signed by:  FABI Fuentes CNP

## 2020-06-19 ENCOUNTER — NURSING HOME VISIT (OUTPATIENT)
Dept: GERIATRICS | Facility: CLINIC | Age: 75
End: 2020-06-19
Payer: MEDICARE

## 2020-06-19 VITALS
RESPIRATION RATE: 16 BRPM | DIASTOLIC BLOOD PRESSURE: 61 MMHG | HEART RATE: 62 BPM | OXYGEN SATURATION: 94 % | WEIGHT: 144.2 LBS | BODY MASS INDEX: 22.63 KG/M2 | TEMPERATURE: 98.9 F | HEIGHT: 67 IN | SYSTOLIC BLOOD PRESSURE: 93 MMHG

## 2020-06-19 DIAGNOSIS — I25.10 CORONARY ARTERY DISEASE INVOLVING NATIVE CORONARY ARTERY OF NATIVE HEART WITHOUT ANGINA PECTORIS: ICD-10-CM

## 2020-06-19 DIAGNOSIS — D62 ANEMIA DUE TO BLOOD LOSS, ACUTE: ICD-10-CM

## 2020-06-19 DIAGNOSIS — R53.81 PHYSICAL DECONDITIONING: ICD-10-CM

## 2020-06-19 DIAGNOSIS — R33.9 URINARY RETENTION: ICD-10-CM

## 2020-06-19 DIAGNOSIS — I35.0 NONRHEUMATIC AORTIC (VALVE) STENOSIS: ICD-10-CM

## 2020-06-19 DIAGNOSIS — T14.8XXA HEMATOMA: ICD-10-CM

## 2020-06-19 DIAGNOSIS — J18.9 PNEUMONIA OF BOTH LOWER LOBES DUE TO INFECTIOUS ORGANISM: ICD-10-CM

## 2020-06-19 DIAGNOSIS — R13.12 OROPHARYNGEAL DYSPHAGIA: ICD-10-CM

## 2020-06-19 DIAGNOSIS — R63.4 WEIGHT LOSS: Primary | ICD-10-CM

## 2020-06-19 DIAGNOSIS — I48.20 CHRONIC A-FIB (H): ICD-10-CM

## 2020-06-19 DIAGNOSIS — K52.831 COLLAGENOUS COLITIS: ICD-10-CM

## 2020-06-19 DIAGNOSIS — F32.A DEPRESSION, UNSPECIFIED DEPRESSION TYPE: ICD-10-CM

## 2020-06-19 DIAGNOSIS — I10 BENIGN ESSENTIAL HYPERTENSION: ICD-10-CM

## 2020-06-19 PROCEDURE — 99309 SBSQ NF CARE MODERATE MDM 30: CPT | Performed by: NURSE PRACTITIONER

## 2020-06-19 ASSESSMENT — MIFFLIN-ST. JEOR: SCORE: 1342.72

## 2020-06-22 ENCOUNTER — HOSPITAL LABORATORY (OUTPATIENT)
Dept: OTHER | Facility: CLINIC | Age: 75
End: 2020-06-22

## 2020-06-22 LAB
ANION GAP SERPL CALCULATED.3IONS-SCNC: 3 MMOL/L (ref 3–14)
BASOPHILS # BLD AUTO: 0.1 10E9/L (ref 0–0.2)
BASOPHILS NFR BLD AUTO: 0.7 %
BUN SERPL-MCNC: 10 MG/DL (ref 7–30)
CALCIUM SERPL-MCNC: 8.5 MG/DL (ref 8.5–10.1)
CHLORIDE SERPL-SCNC: 109 MMOL/L (ref 94–109)
CO2 SERPL-SCNC: 27 MMOL/L (ref 20–32)
CREAT SERPL-MCNC: 1.1 MG/DL (ref 0.66–1.25)
DIFFERENTIAL METHOD BLD: ABNORMAL
EOSINOPHIL # BLD AUTO: 0.5 10E9/L (ref 0–0.7)
EOSINOPHIL NFR BLD AUTO: 4 %
ERYTHROCYTE [DISTWIDTH] IN BLOOD BY AUTOMATED COUNT: 16.4 % (ref 10–15)
GFR SERPL CREATININE-BSD FRML MDRD: 64 ML/MIN/{1.73_M2}
GLUCOSE SERPL-MCNC: 69 MG/DL (ref 70–99)
HCT VFR BLD AUTO: 33.6 % (ref 40–53)
HGB BLD-MCNC: 10.8 G/DL (ref 13.3–17.7)
IMM GRANULOCYTES # BLD: 0.2 10E9/L (ref 0–0.4)
IMM GRANULOCYTES NFR BLD: 1.3 %
LYMPHOCYTES # BLD AUTO: 2.2 10E9/L (ref 0.8–5.3)
LYMPHOCYTES NFR BLD AUTO: 16 %
MCH RBC QN AUTO: 31.6 PG (ref 26.5–33)
MCHC RBC AUTO-ENTMCNC: 32.1 G/DL (ref 31.5–36.5)
MCV RBC AUTO: 98 FL (ref 78–100)
MONOCYTES # BLD AUTO: 0.9 10E9/L (ref 0–1.3)
MONOCYTES NFR BLD AUTO: 6.9 %
NEUTROPHILS # BLD AUTO: 9.7 10E9/L (ref 1.6–8.3)
NEUTROPHILS NFR BLD AUTO: 71.1 %
NRBC # BLD AUTO: 0 10*3/UL
NRBC BLD AUTO-RTO: 0 /100
PLATELET # BLD AUTO: 426 10E9/L (ref 150–450)
POTASSIUM SERPL-SCNC: 3.8 MMOL/L (ref 3.4–5.3)
RBC # BLD AUTO: 3.42 10E12/L (ref 4.4–5.9)
SODIUM SERPL-SCNC: 139 MMOL/L (ref 133–144)
WBC # BLD AUTO: 13.6 10E9/L (ref 4–11)

## 2020-06-23 ENCOUNTER — HOSPITAL LABORATORY (OUTPATIENT)
Dept: OTHER | Facility: CLINIC | Age: 75
End: 2020-06-23

## 2020-06-23 ENCOUNTER — NURSING HOME VISIT (OUTPATIENT)
Dept: GERIATRICS | Facility: CLINIC | Age: 75
End: 2020-06-23
Payer: MEDICARE

## 2020-06-23 VITALS
SYSTOLIC BLOOD PRESSURE: 93 MMHG | OXYGEN SATURATION: 91 % | HEART RATE: 91 BPM | WEIGHT: 145.2 LBS | RESPIRATION RATE: 16 BRPM | HEIGHT: 67 IN | BODY MASS INDEX: 22.79 KG/M2 | DIASTOLIC BLOOD PRESSURE: 60 MMHG | TEMPERATURE: 98.8 F

## 2020-06-23 DIAGNOSIS — I48.20 CHRONIC A-FIB (H): ICD-10-CM

## 2020-06-23 DIAGNOSIS — R53.81 PHYSICAL DECONDITIONING: ICD-10-CM

## 2020-06-23 DIAGNOSIS — R63.4 WEIGHT LOSS: Primary | ICD-10-CM

## 2020-06-23 DIAGNOSIS — K52.831 COLLAGENOUS COLITIS: ICD-10-CM

## 2020-06-23 DIAGNOSIS — F32.A DEPRESSION, UNSPECIFIED DEPRESSION TYPE: ICD-10-CM

## 2020-06-23 DIAGNOSIS — I10 BENIGN ESSENTIAL HYPERTENSION: ICD-10-CM

## 2020-06-23 DIAGNOSIS — J18.9 PNEUMONIA OF BOTH LOWER LOBES DUE TO INFECTIOUS ORGANISM: ICD-10-CM

## 2020-06-23 DIAGNOSIS — I35.0 NONRHEUMATIC AORTIC (VALVE) STENOSIS: ICD-10-CM

## 2020-06-23 DIAGNOSIS — T14.8XXA HEMATOMA: ICD-10-CM

## 2020-06-23 DIAGNOSIS — D62 ANEMIA DUE TO BLOOD LOSS, ACUTE: ICD-10-CM

## 2020-06-23 DIAGNOSIS — R33.9 URINARY RETENTION: ICD-10-CM

## 2020-06-23 DIAGNOSIS — I25.10 CORONARY ARTERY DISEASE INVOLVING NATIVE CORONARY ARTERY OF NATIVE HEART WITHOUT ANGINA PECTORIS: ICD-10-CM

## 2020-06-23 DIAGNOSIS — R13.12 OROPHARYNGEAL DYSPHAGIA: ICD-10-CM

## 2020-06-23 LAB
ALBUMIN UR-MCNC: 10 MG/DL
APPEARANCE UR: CLEAR
BACTERIA #/AREA URNS HPF: ABNORMAL /HPF
BILIRUB UR QL STRIP: NEGATIVE
COLOR UR AUTO: YELLOW
GLUCOSE UR STRIP-MCNC: NEGATIVE MG/DL
HGB UR QL STRIP: ABNORMAL
HYALINE CASTS #/AREA URNS LPF: 4 /LPF (ref 0–2)
KETONES UR STRIP-MCNC: NEGATIVE MG/DL
LEUKOCYTE ESTERASE UR QL STRIP: ABNORMAL
MUCOUS THREADS #/AREA URNS LPF: PRESENT /LPF
NITRATE UR QL: NEGATIVE
PH UR STRIP: 5.5 PH (ref 5–7)
RBC #/AREA URNS AUTO: 28 /HPF (ref 0–2)
SOURCE: ABNORMAL
SP GR UR STRIP: 1.02 (ref 1–1.03)
SQUAMOUS #/AREA URNS AUTO: 3 /HPF (ref 0–1)
UROBILINOGEN UR STRIP-MCNC: NORMAL MG/DL (ref 0–2)
WBC #/AREA URNS AUTO: 13 /HPF (ref 0–5)

## 2020-06-23 PROCEDURE — 99309 SBSQ NF CARE MODERATE MDM 30: CPT | Performed by: NURSE PRACTITIONER

## 2020-06-23 ASSESSMENT — MIFFLIN-ST. JEOR: SCORE: 1347.25

## 2020-06-23 NOTE — PROGRESS NOTES
[facility]  [facility] Record Number: [id number]  Place of Service where encounter took place[facility] [[id number]]  Chief Complaint   Patient presents with     RECHECK       HPI:    Efrem Ramos is a 74 year old (1945), who is being seen today for an episodic care visit. HPI information obtained from: facility chart records, facility staff, patient report and [facility] chart review.   He came to this facility 6/4/2020 for short term rehab and medical management following hospitalization 5/18/2020 due to   hemorrhagic shock secondary to cath site bleeding and a massive hematoma of his left groin post elective  right coronary artery stenting X 4, in anticipation of TAVR. Eliquis had been held 3 days prior to the procedure. CTA abdomen/pelvis showed active extravasation from the left common femoral artery into the left groin with extensive hemorrhage. Vascular Surgery was consulted and hemostasis was achieved with direct US guided pressure. He required pressor support and received a total of 4 units of PRBC. IV iron was given X 2. Hgb stabilized in the mid 8s. Eliquis is on hold pending Cardiology follow up. ASA/Plavix continued given recent PCI. Follow up US 5/29/2020 showed no recurrent pseudoaneurysm, residual left groin hematoma was noted.   Urology consulted for scrotal edema and recommended support and elevation. Tamsulosin was started for urinary retention with high PVRs.   Emergent cardiac cath was done 5/28/2020 due to acute hypotension, leukocytosis, tachycardia and elevated lactate of 3.3.  Metoprolol was decreased and digoxin was started. He had transient tachycardia in the 120s with ambulation, HR improved at rest. SBP intermittently dropped to the 80-90s, but he remained asymptomatic.   CXR 5/29/2020 showed new bibasilar interstitial infiltrates suggesting infection or edema. Given elevated lactate and WBC 12.4, he was treated with vancomycin  "and ceftriaxone. Discharged on levaquin for 3 days. Blood cultures no growth. CT head was done 5/29/2020 for altered level of consciousness and showed diffuse cerebral volume loss and changes consistent with small vessel ischemic disease, no acute pathology. SPEECH THERAPY evaluated for dysphagia and recommended DD2 with thin liquids.   At discharge: WBC 12.0, Hgb 8.8, creatinine 0.91.     Today's concern is:     Weight loss-appetite remains poor and he frequently refuses to eat. Has been drinking small amounts of supplements, with a lot of encouragement from staff. States that he isn't hungry. He's not sure if zofran has helped, \"maybe a little.\"   Oropharyngeal dysphagia  Depression, unspecified depression type-mirtazapine was started last week. Affect remains flat. Stays in bed watching tv, except when working with therapy. His is cooperative with therapies.   Anemia due to blood loss, acute  Hematoma-reports scrotal pain is less   Coronary artery disease involving native coronary artery of native heart without angina pectoris  Nonrheumatic aortic (valve) stenosis  Chronic a-fib (H)-HR: 70-94   Benign essential hypertension-BPs: 99/62, 93/60, 104/65, 103/68   Urinary retention-PVRs have ranged 136-331. Required straight cath 2-3 days ago. Reports hesitancy, no pain.   Temp 100 this am. Denies cough, shortness of breath or chest pain.   Collagenous colitis  Pneumonia of both lower lobes due to infectious organism-no hypoxia   Physical deconditioning-ambulating 150 ft with walker and stand by assist. Requires stand by to contact guard assist with cares. Able to do 6 stairs with contact guard assist.   SLUMS 24/30.     Past Medical and Surgical History reviewed in Epic today.    MEDICATIONS:    Current Outpatient Medications   Medication Sig Dispense Refill     acetaminophen (TYLENOL) 325 MG tablet Take 650 mg by mouth 3 times daily Plus 650 mg bid prn       aspirin (ASA) 81 MG EC tablet Take 1 tablet (81 mg) by " mouth daily Start tomorrow morning. 90 tablet 3     atorvastatin (LIPITOR) 40 MG tablet Take 1 tablet (40 mg) by mouth daily 90 tablet 3     calcium carbonate 600 mg-vitamin D 400 units (CALTRATE) 600-400 MG-UNIT per tablet Take 1 tablet by mouth 2 times daily       clopidogrel (PLAVIX) 75 MG tablet Take 1 tablet (75 mg) by mouth daily 90 tablet 3     Cyanocobalamin 2000 MCG TABS Take 2,000 mcg by mouth every 7 days On Sundays       digoxin (LANOXIN) 125 MCG tablet Take 125 mcg by mouth daily       gabapentin (NEURONTIN) 300 MG capsule Take 2 capsules (600 mg) by mouth every evening 60 capsule 5     loperamide (IMODIUM) 2 MG capsule Take 2 mg by mouth 4 times daily as needed for diarrhea       melatonin 1 MG TABS tablet Take 3 mg by mouth nightly as needed for sleep        metoprolol tartrate (LOPRESSOR) 25 MG tablet Take 0.5 tablets (12.5 mg) by mouth 2 times daily Hold for SBP<100 or HR<60 60 tablet 0     mirtazapine (REMERON) 7.5 MG tablet Take 15 mg by mouth At Bedtime        multivitamin (OCUVITE) TABS Take 1 tablet by mouth 2 times daily        nitroGLYcerin (NITROSTAT) 0.4 MG sublingual tablet For chest pain place 1 tablet under the tongue every 5 minutes for 3 doses. If symptoms persist 5 minutes after 1st dose call 911. 20 tablet 0     ondansetron (ZOFRAN-ODT) 4 MG ODT tab Take 4 mg by mouth 3 times daily Prior to meals       oxyCODONE (ROXICODONE) 5 MG tablet Take 0.5 tablets (2.5 mg) by mouth every 8 hours as needed for moderate to severe pain 15 tablet 0     polyethylene glycol (MIRALAX/GLYCOLAX) powder Take 1 capful by mouth daily as needed for constipation       potassium chloride ER 20 MEQ PO CR tablet Take 1 tablet (20 mEq) by mouth daily 90 tablet 3     Skin Protectants, Misc. (EUCERIN) cream Apply topically every hour as needed for dry skin 120 g 0     REVIEW OF SYSTEMS:  4 point ROS including Respiratory, CV, GI and , other than that noted in the HPI,  is negative    Objective:  BP 93/60    "Pulse 91   Temp 98.8  F (37.1  C)   Resp 16   Ht 1.702 m (5' 7\")   Wt 65.9 kg (145 lb 3.2 oz)   SpO2 91%   BMI 22.74 kg/m    Exam:  GENERAL APPEARANCE:  Alert, in no distress, thin  ENT:  [place]  EYES:  EOM normal, conjunctiva and lids normal  RESP:  respiratory effort and palpation of chest normal, lungs clear to auscultation , no respiratory distress  CV:  Palpation and auscultation of heart done , irregular rhythm, rate normal, no murmur,  no edema, +2 pedal pulses  ABDOMEN:  soft, non-tender, no distension, no masses  M/S:   in bed. DAMIAN with good strength  SKIN:  fading hematoma across lower abdomen and groin. No erythema or warmth. No open areas  PSYCH:  oriented X 3, insight and judgement impaired, memory impaired , affect and mood normal    Labs:   Recent labs in [facility] reviewed by me today.     ASSESSMENT/PLAN:  (R63.4) Weight loss  (primary encounter diagnosis)  (R13.12) Oropharyngeal dysphagia  Comment: appetite remains poor. No acute GI issues and this seems to be related to depression. Tolerating DD3 with thin liquids. Weight has stabilized at 143-145 lbs.   Plan: increase mirtazapine to 15 mg HS. Continue SPEECH THERAPY, advance diet as tolerated. Continue supplements. Dietician is involved.     (F32.9) Depression, unspecified depression type  Comment: he somewhat brighter and more talkative today   Plan: increase mirtazapine as above. Onsite psychologist is involved.     (D62) Anemia due to blood loss, acute  (T14.8XXA) Hematoma  Comment: hematoma and scrotal edema slowly resolving. Hgb improved at 10.8.   Plan: follow Hgb     (I25.10) Coronary artery disease involving native coronary artery of native heart without angina pectoris  (I35.0) Nonrheumatic aortic (valve) stenosis  Comment: s/p complex PCI with rotational atherectomy and 4 stents to the proximal to distal RCA on 5/18/2020. Plan is for TAVR in the future, but his functional status and weight will need to improve.   Plan: continue ASA, " Plavix. Continue metoprolol with parameters. Cardiology follow up later this week.     (I48.20) Chronic a-fib (H)  Comment: rate remains controlled after digoxin dose was decreased 6/17/2020 (after consulting with Pedro Grimes NP with Cardiology).   Plan: continue digoxin. Continue metoprolol with parameters. Eliquis remains on hold, pending Cardiology follow up (wife cancelled 6/11/2020 appointment).     (I10) Benign essential hypertension  Comment: BPs unchanged, asymptomatic   Plan: continue metoprolol for rate control, with parameters. Monitor VS. Avoid hypotension due to advanced age and fall risk    (R33.9) Urinary retention  Comment: scrotal edema improving. Has required  intermittent straight cath for PVR >350 ml. Fever to 100 this morning and ongoing urinary issues are  concerning for UTI.   WBC has been slightly high throughout his hospitalization and tcu stay, some improvement at 13.6 on 6/22/2020.   Plan: UA/UC. Monitor temp, symptoms.     (K52.831) Collagenous colitis  Comment: stools are loose at baseline. C diff was negative 6/12/2020.   Plan: monitor symptoms     (J18.9) Pneumonia of both lower lobes due to infectious organism  Comment: completed levaquin 6/7/2020 and infection appears resolved   Plan: monitor respiratory symptoms     (R53.81) Physical deconditioning  Comment: slowly progressing in therapies.   Lives with his wife and son. Their son is a hoarder and there is no space for patient to use a walker or have home services, per patient and wife. Moving to [place] is not possible at this time.   Plan: continue PHYSICAL THERAPY/OT. Goal is to return home with family, hopefully he will allow home care to assess.       Electronically signed by:  FABI Fuentes CNP

## 2020-06-24 LAB
BACTERIA SPEC CULT: NORMAL
Lab: NORMAL
SPECIMEN SOURCE: NORMAL

## 2020-06-25 ENCOUNTER — VIRTUAL VISIT (OUTPATIENT)
Dept: CARDIOLOGY | Facility: CLINIC | Age: 75
End: 2020-06-25
Payer: MEDICARE

## 2020-06-25 DIAGNOSIS — Z53.9 ERRONEOUS ENCOUNTER--DISREGARD: Primary | ICD-10-CM

## 2020-06-25 NOTE — PROGRESS NOTES
"Efrem Ramos is a 74 year old male who is being evaluated via a billable video visit.      The patient has been notified of following:     \"This video visit will be conducted via a call between you and your physician/provider. We have found that certain health care needs can be provided without the need for an in-person physical exam.  This service lets us provide the care you need with a video conversation.  If a prescription is necessary we can send it directly to your pharmacy.  If lab work is needed we can place an order for that and you can then stop by our lab to have the test done at a later time.    Video visits are billed at different rates depending on your insurance coverage.  Please reach out to your insurance provider with any questions.    If during the course of the call the physician/provider feels a video visit is not appropriate, you will not be charged for this service.\"    Patient has given verbal consent for Video visit? Yes  Please Email pt at [email or web address]  Will anyone else be joining your video visit? No    Vitals reported by patient:  B/P: 6:03 am:  107/64 laying down   HR: 87  Weight: 143.8 lbs    Review Of Systems  Skin: negative  Eyes: negative  Ears/Nose/Throat: negative  Respiratory: No shortness of breath, dyspnea on exertion, cough, or hemoptysis  Cardiovascular: negative  Gastrointestinal: negative  Genitourinary: negative  Musculoskeletal: negative  Neurologic: negative  Psychiatric: negative  Hematologic/Lymphatic/Immunologic: negative  Endocrine: negative    Information was provided by Phill Green A phone # that I reached him on was [phone].   Reviewed by: Joy Rangel MA    -----------------------------------------------------------------------------------------------------------------------------    Primary Cardiologist: Dr. Xiong    Reason for Video Visit: Hospital follow up    HPI:  This is a very pleasant 74-year-old gentleman with past medical " history notable for coronary artery disease, history of CVA, obstructive sleep apnea, dyslipidemia, hypertension, paradoxical low-flow low gradient moderately severe aortic stenosis (valve area of 0.8 cm , mean gradient of 32 mmHg in the setting of low stroke volume index of 23 mL/m  with LVEF 50 to 55%; subsequent echo studies shows SEBASTIAN up to 1.3 cm with DI 0.39; commented visually appears more severe and stress dobutamine echo in 7/2019 showed mean gradient increase to 30 mmHg), mild mitral stenosis (severe MAC, mean gradient of 6.8 mmHg at heart rate 106 bpm), 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     He was seen in the structural heart clinic recently for symptoms of worsening dyspnea on exertion.  He was initiated on work-up for transcatheter aortic valve replacement.  He underwent left and right heart catheterization.  His right heart catheterization showed normal right and left heart pressures and normal cardiac output and index.  He did have one-vessel coronary artery disease with 60% mid RCA lesion with IFR R of 0.97 and 60 to 70% distal RCA lesion with IFR of 0.89 (this was in the setting of suboptimally controlled heart rate).  Given his suboptimal heart rates his metoprolol was increased from 12.5 mg to 25 mg daily.  Revascularization of the distal RCA lesion was deferred after discussing the case with Dr. Swenson.  He wanted to further evaluate and see what the results of the patient's other tests would show.  Angiogram also revealed significant peripheral arterial disease with likely significant obstructive disease in the right common iliac artery.  Given his right heart catheterization results  it was felt that his shortness of breath could be more lung related.     CT demonstrated fibrotic and emphysematous abnormalities in the lungs.  There appears to be a prior wedge resection in the right lung.  No effusion or infiltration is noted.  CT angiogram portion of the study showed no acute aortic dissection, intramural hematoma, or contained rupture.    He recently underwent left heart catheterization forcomplex PCI of heavily calcified RCA lesion (received 4 ISABEL from proximal to distal), but this was complicated by bleeding from access site leading to hemorrhagic shock, requiring short course of pressors and blood transfusion (4 units). His hgb was as low as 7.9 but improved to around 10. Due to his complications he had a repeat left and heart catheterization during which his valve was crossed and this showed SEBASTIAN of 1.1. cm2 and mean gradient of 23 mmHg with normal cardiac output. He was kept on DAPT to be taken for 2 weeks and then switching his aspirin to anticoagulation. He was also started on digoxin for rate control as his metoprolol could not be increase further due to soft BP. If rate control became difficult referral for AVNA/PPM was recommended.        Physical Exam:  A limited exam was conducted via video.  Constitutional:  Patient is pleasant, alert, cooperative, and in NAD.  HEENT:  NCAT. EOM's intact.   Neck:  CVP appears normal.   Pulmonary: Normal respiratory effort.   Extremities: No edema, erythema, cyanosis appreciated.  Skin:  No rashes or lesions appreciated.   Neurological:  No gross motor or sensory deficits.   Psych: Appropriate affect.       A/P:     There was miscommunication regarding timing for appointment. Nurse staff at [facility] asked to reschedule.    Video start time: N/A  Video end time: N/A  Originating Location (pt. Location): home  Distant Location (provider location):  [facility]   Mode of Communication:  Video Conference via [employer]        This visit is being conducted as a virtual visit due to the emphasis on mitigation of the COVID-19 virus pandemic. The clinician has decided that the risk of an in-office visit outweighs the benefit for this patient. The rest of the comprehensive physical examination is deferred due to public health emergency video visit restrictions.         Garcia Vincent PA-C   6/25/2020  Pager: [phone]

## 2020-06-25 NOTE — PROGRESS NOTES
Garcia Vincent PA-C   6/25/2020  Pager: [phone]      This encounter was opened in error. There was miscommunication regarding timing of appointment. Staff at [facility] asked to reschedule. Please disregard.

## 2020-06-25 NOTE — LETTER
"6/25/2020    Danny Paige MD, MD  [address] [facility] [address]  [facility] [address]    RE: Efrem Ramos       Dear Colleague,    I had the pleasure of seeing Efrem Ramos in the [facility].    Garcia Vincent PA-C   6/25/2020  Pager: [phone]      This encounter was opened in error. There was miscommunication regarding timing of appointment. Staff at [facility] asked to reschedule. Please disregard.    Efrem Ramos is a 74 year old male who is being evaluated via a billable video visit.      The patient has been notified of following:     \"This video visit will be conducted via a call between you and your physician/provider. We have found that certain health care needs can be provided without the need for an in-person physical exam.  This service lets us provide the care you need with a video conversation.  If a prescription is necessary we can send it directly to your pharmacy.  If lab work is needed we can place an order for that and you can then stop by our lab to have the test done at a later time.    Video visits are billed at different rates depending on your insurance coverage.  Please reach out to your insurance provider with any questions.    If during the course of the call the physician/provider feels a video visit is not appropriate, you will not be charged for this service.\"    Patient has given verbal consent for Video visit? Yes  Please Email pt at [email or web address]  Will anyone else be joining your video visit? No    Vitals reported by patient:  B/P: 6:03 am:  107/64 laying down   HR: 87  Weight: 143.8 lbs    Review Of Systems  Skin: negative  Eyes: negative  Ears/Nose/Throat: negative  Respiratory: No shortness of breath, dyspnea on exertion, cough, or hemoptysis  Cardiovascular: negative  Gastrointestinal: negative  Genitourinary: negative  Musculoskeletal: negative  Neurologic: negative  Psychiatric: " negative  Hematologic/Lymphatic/Immunologic: negative  Endocrine: negative    Information was provided by Phill @ [employer] phone # that I reached him on was [phone].   Reviewed by: Joy Rangel MA    -----------------------------------------------------------------------------------------------------------------------------    Primary Cardiologist: Dr. Xiong    Reason for Video Visit: Hospital follow up    HPI:  This is a very pleasant 74-year-old gentleman with past medical history notable for coronary artery disease, history of CVA, obstructive sleep apnea, dyslipidemia, hypertension, paradoxical low-flow low gradient moderately severe aortic stenosis (valve area of 0.8 cm , mean gradient of 32 mmHg in the setting of low stroke volume index of 23 mL/m  with LVEF 50 to 55%; subsequent echo studies shows SEBASTIAN up to 1.3 cm with DI 0.39; commented visually appears more severe and stress dobutamine echo in 7/2019 showed mean gradient increase to 30 mmHg), mild mitral stenosis (severe MAC, mean gradient of 6.8 mmHg at heart rate 106 bpm), 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     He was seen in the structural heart clinic recently for symptoms of worsening dyspnea on exertion.  He was initiated on work-up for transcatheter aortic valve replacement.  He underwent left and right heart catheterization.  His right heart catheterization showed normal right and left heart pressures and normal cardiac output and index.  He did have one-vessel coronary artery disease with 60% mid RCA lesion with IFR R of 0.97 and 60 to 70% distal RCA lesion with IFR of 0.89 (this was in the setting of suboptimally  controlled heart rate).  Given his suboptimal heart rates his metoprolol was increased from 12.5 mg to 25 mg daily.  Revascularization of the distal RCA lesion was deferred after discussing the case with Dr. Swenson.  He wanted to further evaluate and see what the results of the patient's other tests would show.  Angiogram also revealed significant peripheral arterial disease with likely significant obstructive disease in the right common iliac artery.  Given his right heart catheterization results it was felt that his shortness of breath could be more lung related.     CT demonstrated fibrotic and emphysematous abnormalities in the lungs.  There appears to be a prior wedge resection in the right lung.  No effusion or infiltration is noted.  CT angiogram portion of the study showed no acute aortic dissection, intramural hematoma, or contained rupture.    He recently underwent left heart catheterization forcomplex PCI of heavily calcified RCA lesion (received 4 ISABEL from proximal to distal), but this was complicated by bleeding from access site leading to hemorrhagic shock, requiring short course of pressors and blood transfusion (4 units). His hgb was as low as 7.9 but improved to around 10. Due to his complications he had a repeat left and heart catheterization during which his valve was crossed and this showed SEBASTIAN of 1.1. cm2 and mean gradient of 23 mmHg with normal cardiac output. He was kept on DAPT to be taken for 2 weeks and then switching his aspirin to anticoagulation. He was also started on digoxin for rate control as his metoprolol could not be increase further due to soft BP. If rate control became difficult referral for AVNA/PPM was recommended.        Physical Exam:  A limited exam was conducted via video.  Constitutional:  Patient is pleasant, alert, cooperative, and in NAD.  HEENT:  NCAT. EOM's intact.   Neck:  CVP appears normal.   Pulmonary: Normal respiratory effort.   Extremities: No edema,  erythema, cyanosis appreciated.  Skin:  No rashes or lesions appreciated.   Neurological:  No gross motor or sensory deficits.   Psych: Appropriate affect.       A/P:     There was miscommunication regarding timing for appointment. Nurse staff at [facility] asked to reschedule.    Video start time: N/A  Video end time: N/A  Originating Location (pt. Location): home  Distant Location (provider location):  [facility]   Mode of Communication:  Video Conference via [employer]       This visit is being conducted as a virtual visit due to the emphasis on mitigation of the COVID-19 virus pandemic. The clinician has decided that the risk of an in-office visit outweighs the benefit for this patient. The rest of the comprehensive physical examination is deferred due to public health emergency video visit restrictions.         Garcia Vincent PA-C   6/25/2020  Pager: [phone]        Thank you for allowing me to participate in the care of your patient.      Sincerely,     Garcia Vincent PA-C     [facility]    cc:   Danny Paige MD  [address] [address]

## 2020-06-27 ENCOUNTER — TELEPHONE (OUTPATIENT)
Dept: GERIATRICS | Facility: CLINIC | Age: 75
End: 2020-06-27

## 2020-06-27 NOTE — TELEPHONE ENCOUNTER
Resident had a fall this AM with no injury.    Staff note his temp runs on higher normal possible.  In the 99's but no acute symptoms.    Asked them if temp goes above 100, then to follow up with on call      Electronically signed by Yudy Michaels RN, CNP

## 2020-06-29 ENCOUNTER — VIRTUAL VISIT (OUTPATIENT)
Dept: UROLOGY | Facility: CLINIC | Age: 75
End: 2020-06-29
Payer: MEDICARE

## 2020-06-29 VITALS — HEIGHT: 68 IN | WEIGHT: 143 LBS | BODY MASS INDEX: 21.67 KG/M2

## 2020-06-29 DIAGNOSIS — R33.9 URINARY RETENTION: Primary | ICD-10-CM

## 2020-06-29 PROCEDURE — 99442 ZZC PHYSICIAN TELEPHONE EVALUATION 11-20 MIN: CPT | Performed by: UROLOGY

## 2020-06-29 ASSESSMENT — MIFFLIN-ST. JEOR: SCORE: 1353.14

## 2020-06-29 ASSESSMENT — PAIN SCALES - GENERAL: PAINLEVEL: MODERATE PAIN (5)

## 2020-06-29 NOTE — PROGRESS NOTES
"Efrem Ramos is a 74 year old male who is being evaluated via a billable telephone visit.      The patient has been notified of following:     \"This telephone visit will be conducted via a call between you and your physician/provider. We have found that certain health care needs can be provided without the need for a physical exam.  This service lets us provide the care you need with a short phone conversation.  If a prescription is necessary we can send it directly to your pharmacy.  If lab work is needed we can place an order for that and you can then stop by our lab to have the test done at a later time.    Telephone visits are billed at different rates depending on your insurance coverage. During this emergency period, for some insurers they may be billed the same as an in-person visit.  Please reach out to your insurance provider with any questions.    If during the course of the call the physician/provider feels a telephone visit is not appropriate, you will not be charged for this service.\"    Patient has given verbal consent for Telephone visit?  Yes    What phone number would you like to be contacted at? [phone]    How would you like to obtain your AVS? Mail a copy   This pleasant 74-year-old gentleman who was having voiding difficulties following elective heart catheterization and stent placement with who developed a significant groin hematoma.  As noted he has a history of cerebrovascular accidents, atrial fibrillation on chronic anticoagulation with Eliquis, coronary artery disease mitral stenosis and severe aortic stenosis.  We will concern yesterday that he still was getting significant postvoid residuals in excess of 200 cc and we started Flomax 0.4 mg daily.  Today the post void residual volumes have declined and I certainly no more than 150 cc and he seems to be voiding a little more satisfactorily.  My current recommendation therefore is that he should continue on Flomax during his " convalescence.  I would recommend a follow-up virtual visit in about 4 weeks time unless he is having increasing difficulty voiding again.    Past Medical History:   Diagnosis Date     Atrial fibrillation (H)     amiodarone therapy discontinued due to pulmonary toxicity     Atrial flutter (H)      Benign essential hypertension 11/20/2018     Cancer (H) vocal cord     Carpal tunnel syndrome     abstracted 7/3/02.     Coronary artery disease involving native coronary artery of native heart without angina pectoris 5/8/2020    Cath 5/28/2020: patent stents; Cath 5/18/2020: ISABEL x4 to RCA; Cath 3/2020: 1 vessel disease; Cath 2017: moderate 2 vessel disease     CVA (cerebral infarction) 5/5/2015     Demyelinating disease of central nervous system, unspecified (H)     abstracted 7/3/02.     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     Pneumonia 8/17/2016     PVD (peripheral vascular disease) (H)      Redundant colon     needs CT colonography     Shingles      SKIN DISORDERS NEC 3/15/2005     Sleep apnea      SVT (supraventricular tachycardia) (H)      Past Surgical History:   Procedure Laterality Date     BIOPSY  brain 2002     BONE MARROW BIOPSY, BONE SPECIMEN, NEEDLE/TROCAR N/A 6/8/2017    Procedure: BIOPSY BONE MARROW;  UNILATERAL BONE MARROW BIOPSY (CONSCIOUS SEDATION) ;  Surgeon: Jamie Gonzales MD;  Location: [facility]     C NONSPECIFIC  PROCEDURE  as a child    T & A. abstracted 7/3/02.     C NONSPECIFIC PROCEDURE  early    CTR. abstracted 7/3/02.     CARDIAC CATHERIZATION  09/05/2017    2V CAD, IFR of RCA 0.95     CV CORONARY ANGIOGRAM N/A 3/23/2020    Procedure: Coronary Angiogram and right heart cath;  Surgeon: Gino Ferrer MD;  Location:  HEART CARDIAC CATH LAB     CV HEART CATHETERIZATION WITH POSSIBLE INTERVENTION N/A 5/18/2020    Procedure: Heart Catheterization with Possible Intervention;  Surgeon: Gaurang Swenson MD;  Location:  HEART CARDIAC CATH LAB     CV HEART CATHETERIZATION WITH POSSIBLE INTERVENTION N/A 5/28/2020    Procedure: Heart Catheterization with Possible Intervention;  Surgeon: Larry Chawla MD;  Location:  HEART CARDIAC CATH LAB     CV INSTANTANEOUS WAVE-FREE RATIO N/A 3/23/2020    Procedure: Instantaneous Wave-Free Ratio;  Surgeon: Gino Ferrer MD;  Location:  HEART CARDIAC CATH LAB     CV PCI ATHERECTOMY ORBITAL N/A 5/18/2020    Procedure: Percutaneous Coronary Intervention Atherectomy Rotational;  Surgeon: Gaurang Swenson MD;  Location:  HEART CARDIAC CATH LAB     CV PCI STENT DRUG ELUTING N/A 5/18/2020    Procedure: Percutaneous Coronary Intervention Stent Drug Eluting;  Surgeon: Gaurang Swenson MD;  Location:  HEART CARDIAC CATH LAB     CV RIGHT HEART CATH N/A 5/28/2020    Procedure: Right Heart Cath;  Surgeon: Larry Chawla MD;  Location:  HEART CARDIAC CATH LAB     CV TEMPORARY PACEMAKER INSERTION N/A 5/18/2020    Procedure: Temporary Pacemaker Insertion;  Surgeon: Gaurang Swenson MD;  Location:  HEART CARDIAC CATH LAB     ESOPHAGOSCOPY, GASTROSCOPY, DUODENOSCOPY (EGD), COMBINED N/A 9/8/2018    Procedure: COMBINED ESOPHAGOSCOPY, GASTROSCOPY, DUODENOSCOPY (EGD), BIOPSY SINGLE OR MULTIPLE;;  Surgeon: Xander Marie MD;  Location:  GI     HEAD & NECK SURGERY       ORTHOPEDIC SURGERY      right arm ulna reset after injury      THORACOSCOPIC WEDGE RESECTION LUNG Right 8/2/2016    Procedure: THORACOSCOPIC WEDGE RESECTION LUNG;  Surgeon: Abdelrahman Noriega MD;  Location: [facility] OR       Current Outpatient Medications:      acetaminophen (TYLENOL) 325 MG tablet, Take 650 mg by mouth 3 times daily Plus 650 mg bid prn, Disp: , Rfl:      aspirin (ASA) 81 MG EC tablet, Take 1 tablet (81 mg) by mouth daily Start tomorrow morning., Disp: 90 tablet, Rfl: 3     atorvastatin (LIPITOR) 40 MG tablet, Take 1 tablet (40 mg) by mouth daily, Disp: 90 tablet, Rfl: 3     calcium carbonate 600 mg-vitamin D 400 units (CALTRATE) 600-400 MG-UNIT per tablet, Take 1 tablet by mouth 2 times daily, Disp: , Rfl:      clopidogrel (PLAVIX) 75 MG tablet, Take 1 tablet (75 mg) by mouth daily, Disp: 90 tablet, Rfl: 3     Cyanocobalamin 2000 MCG TABS, Take 2,000 mcg by mouth every 7 days On Sundays, Disp: , Rfl:      digoxin (LANOXIN) 125 MCG tablet, Take 125 mcg by mouth daily, Disp: , Rfl:      gabapentin (NEURONTIN) 300 MG capsule, Take 2 capsules (600 mg) by mouth every evening, Disp: 60 capsule, Rfl: 5     loperamide (IMODIUM) 2 MG capsule, Take 2 mg by mouth 4 times daily as needed for diarrhea, Disp: , Rfl:      melatonin 1 MG TABS tablet, Take 3 mg by mouth nightly as needed for sleep , Disp: , Rfl:      metoprolol tartrate (LOPRESSOR) 25 MG tablet, Take 0.5 tablets (12.5 mg) by mouth 2 times daily Hold for SBP<100 or HR<60, Disp: 60 tablet, Rfl: 0     mirtazapine (REMERON) 7.5 MG tablet, Take 15 mg by mouth At Bedtime , Disp: , Rfl:      multivitamin (OCUVITE) TABS, Take 1 tablet by mouth 2 times daily , Disp: , Rfl:      nitroGLYcerin (NITROSTAT) 0.4 MG sublingual tablet, For chest pain place 1 tablet under the tongue every 5 minutes for 3 doses. If symptoms persist 5 minutes after 1st dose call 911. (Patient not taking: Reported on 6/25/2020), Disp: 20 tablet, Rfl: 0     ondansetron (ZOFRAN-ODT) 4 MG ODT tab, Take 4 mg by mouth 3 times daily Prior to meals,  "Disp: , Rfl:      oxyCODONE (ROXICODONE) 5 MG tablet, Take 0.5 tablets (2.5 mg) by mouth every 8 hours as needed for moderate to severe pain, Disp: 15 tablet, Rfl: 0     polyethylene glycol (MIRALAX/GLYCOLAX) powder, Take 1 capful by mouth daily as needed for constipation, Disp: , Rfl:      potassium chloride ER 20 MEQ PO CR tablet, Take 1 tablet (20 mEq) by mouth daily, Disp: 90 tablet, Rfl: 3     Skin Protectants, Misc. (EUCERIN) cream, Apply topically every hour as needed for dry skin (Patient not taking: Reported on 6/25/2020), Disp: 120 g, Rfl: 0      ROS: 10 point ROS neg other than the symptoms noted above in the HPI.    Impression.  I had a careful discussion today with the patient on the telephone.  He is still in convalescence at [facility], he however states he is voiding satisfactorily although he is getting to the bathroom only with the help of staff and a walker.  He has not observed blood in the urine, he is not troubled too much during the night, the stream seems to be initiated satisfactorily and is had no evidence of urinary tract infection or incontinence.  Apparently the bladder scanner at [facility] has not been working well so they have not been able to do estimations of residual volume but by all accounts based on my discussion today the patient is voiding well with few problems.  Therefore I do not think we need to initiate any further treatment at the present time.  Clearly if there is problems in the near future, we will need to be informed.  Otherwise, I would recommend I see him in the office in 3 months for cystoscopy to evaluate for intravesical obstruction and assess his progress.  I carefully discussed the situation with him in detail.  I answered all his questions.    Plan.  Cystoscopy in 3 months.      Phone call duration: 12 minutes    It is a pleasure to coordinate this very pleasant 74-year-old gentleman in telephone consultation today.  He was seen in the hospital initially.  \"This " "dictation was performed with voice recognition software and may contain errors,  omissions and inadvertent word substitution.\"    Junior Doan MD    "

## 2020-06-29 NOTE — LETTER
"6/29/2020       RE: Efrem Ramos  [address] [facility] [address]     Dear Colleague,    Thank you for referring your patient, Efrem Ramos, to the [facility] [place] at [facility]. Please see a copy of my visit note below.    Efrem Ramos is a 74 year old male who is being evaluated via a billable telephone visit.      The patient has been notified of following:     \"This telephone visit will be conducted via a call between you and your physician/provider. We have found that certain health care needs can be provided without the need for a physical exam.  This service lets us provide the care you need with a short phone conversation.  If a prescription is necessary we can send it directly to your pharmacy.  If lab work is needed we can place an order for that and you can then stop by our lab to have the test done at a later time.    Telephone visits are billed at different rates depending on your insurance coverage. During this emergency period, for some insurers they may be billed the same as an in-person visit.  Please reach out to your insurance provider with any questions.    If during the course of the call the physician/provider feels a telephone visit is not appropriate, you will not be charged for this service.\"    Patient has given verbal consent for Telephone visit?  Yes    What phone number would you like to be contacted at? [phone]    How would you like to obtain your AVS? Mail a copy   This pleasant 74-year-old gentleman who was having voiding difficulties following elective heart catheterization and stent placement with who developed a significant groin hematoma.  As noted he has a history of cerebrovascular accidents, atrial fibrillation on chronic anticoagulation with Eliquis, coronary artery disease mitral stenosis and severe aortic stenosis.  We will concern yesterday that he still was getting significant " postvoid residuals in excess of 200 cc and we started Flomax 0.4 mg daily.  Today the post void residual volumes have declined and I certainly no more than 150 cc and he seems to be voiding a little more satisfactorily.  My current recommendation therefore is that he should continue on Flomax during his convalescence.  I would recommend a follow-up virtual visit in about 4 weeks time unless he is having increasing difficulty voiding again.    Past Medical History:   Diagnosis Date     Atrial fibrillation (H)     amiodarone therapy discontinued due to pulmonary toxicity     Atrial flutter (H)      Benign essential hypertension 11/20/2018     Cancer (H) vocal cord     Carpal tunnel syndrome     abstracted 7/3/02.     Coronary artery disease involving native coronary artery of native heart without angina pectoris 5/8/2020    Cath 5/28/2020: patent stents; Cath 5/18/2020: ISABEL x4 to RCA; Cath 3/2020: 1 vessel disease; Cath 2017: moderate 2 vessel disease     CVA (cerebral infarction) 5/5/2015     Demyelinating disease of central nervous system, unspecified (H)     abstracted 7/3/02.     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     Pneumonia 8/17/2016     PVD (peripheral vascular disease) (H)      Redundant colon     needs CT colonography     Shingles      SKIN DISORDERS NEC 3/15/2005     Sleep apnea      SVT (supraventricular tachycardia) (H)       Past Surgical History:   Procedure Laterality Date     BIOPSY  brain 2002     BONE MARROW BIOPSY, BONE SPECIMEN, NEEDLE/TROCAR N/A 6/8/2017    Procedure: BIOPSY BONE MARROW;  UNILATERAL BONE MARROW BIOPSY (CONSCIOUS SEDATION) ;  Surgeon: Jamie Gonzales MD;  Location:  GI     C NONSPECIFIC PROCEDURE  as a child    T & A. abstracted 7/3/02.     C NONSPECIFIC PROCEDURE  early    CTR. abstracted 7/3/02.     CARDIAC CATHERIZATION  09/05/2017    2V CAD, IFR of RCA 0.95     CV CORONARY ANGIOGRAM N/A 3/23/2020    Procedure: Coronary Angiogram and right heart cath;  Surgeon: Gino Ferrer MD;  Location:  HEART CARDIAC CATH LAB     CV HEART CATHETERIZATION WITH POSSIBLE INTERVENTION N/A 5/18/2020    Procedure: Heart Catheterization with Possible Intervention;  Surgeon: Gaurang Swenson MD;  Location:  HEART CARDIAC CATH LAB     CV HEART CATHETERIZATION WITH POSSIBLE INTERVENTION N/A 5/28/2020    Procedure: Heart Catheterization with Possible Intervention;  Surgeon: Larry Chawla MD;  Location:  HEART CARDIAC CATH LAB     CV INSTANTANEOUS WAVE-FREE RATIO N/A 3/23/2020    Procedure: Instantaneous Wave-Free Ratio;  Surgeon: Gino Ferrer MD;  Location: [facility] CARDIAC CATH LAB     CV PCI ATHERECTOMY ORBITAL N/A 5/18/2020    Procedure: Percutaneous Coronary Intervention Atherectomy Rotational;  Surgeon: Gaurang Swenson MD;  Location: [facility] CARDIAC CATH LAB     CV PCI STENT DRUG ELUTING N/A 5/18/2020    Procedure: Percutaneous Coronary Intervention Stent Drug Eluting;  Surgeon: Gaurang Swenson MD;  Location:  HEART CARDIAC CATH LAB     CV RIGHT HEART CATH N/A 5/28/2020    Procedure: Right Heart Cath;  Surgeon: Larry Chawla MD;  Location: [facility] CARDIAC CATH LAB     CV TEMPORARY PACEMAKER INSERTION N/A 5/18/2020    Procedure: Temporary Pacemaker Insertion;  Surgeon: Gaurang Swenson MD;  Location: [facility] CARDIAC CATH LAB     ESOPHAGOSCOPY,  GASTROSCOPY, DUODENOSCOPY (EGD), COMBINED N/A 9/8/2018    Procedure: COMBINED ESOPHAGOSCOPY, GASTROSCOPY, DUODENOSCOPY (EGD), BIOPSY SINGLE OR MULTIPLE;;  Surgeon: Xander Marie MD;  Location:  GI     HEAD & NECK SURGERY       ORTHOPEDIC SURGERY      right arm ulna reset after injury     THORACOSCOPIC WEDGE RESECTION LUNG Right 8/2/2016    Procedure: THORACOSCOPIC WEDGE RESECTION LUNG;  Surgeon: Abdelrahman Noriega MD;  Location:  OR       Current Outpatient Medications:      acetaminophen (TYLENOL) 325 MG tablet, Take 650 mg by mouth 3 times daily Plus 650 mg bid prn, Disp: , Rfl:      aspirin (ASA) 81 MG EC tablet, Take 1 tablet (81 mg) by mouth daily Start tomorrow morning., Disp: 90 tablet, Rfl: 3     atorvastatin (LIPITOR) 40 MG tablet, Take 1 tablet (40 mg) by mouth daily, Disp: 90 tablet, Rfl: 3     calcium carbonate 600 mg-vitamin D 400 units (CALTRATE) 600-400 MG-UNIT per tablet, Take 1 tablet by mouth 2 times daily, Disp: , Rfl:      clopidogrel (PLAVIX) 75 MG tablet, Take 1 tablet (75 mg) by mouth daily, Disp: 90 tablet, Rfl: 3     Cyanocobalamin 2000 MCG TABS, Take 2,000 mcg by mouth every 7 days On Sundays, Disp: , Rfl:      digoxin (LANOXIN) 125 MCG tablet, Take 125 mcg by mouth daily, Disp: , Rfl:      gabapentin (NEURONTIN) 300 MG capsule, Take 2 capsules (600 mg) by mouth every evening, Disp: 60 capsule, Rfl: 5     loperamide (IMODIUM) 2 MG capsule, Take 2 mg by mouth 4 times daily as needed for diarrhea, Disp: , Rfl:      melatonin 1 MG TABS tablet, Take 3 mg by mouth nightly as needed for sleep , Disp: , Rfl:      metoprolol tartrate (LOPRESSOR) 25 MG tablet, Take 0.5 tablets (12.5 mg) by mouth 2 times daily Hold for SBP<100 or HR<60, Disp: 60 tablet, Rfl: 0     mirtazapine (REMERON) 7.5 MG tablet, Take 15 mg by mouth At Bedtime , Disp: , Rfl:      multivitamin (OCUVITE) TABS, Take 1 tablet by mouth 2 times daily , Disp: , Rfl:      nitroGLYcerin (NITROSTAT) 0.4 MG sublingual  tablet, For chest pain place 1 tablet under the tongue every 5 minutes for 3 doses. If symptoms persist 5 minutes after 1st dose call 911. (Patient not taking: Reported on 6/25/2020), Disp: 20 tablet, Rfl: 0     ondansetron (ZOFRAN-ODT) 4 MG ODT tab, Take 4 mg by mouth 3 times daily Prior to meals, Disp: , Rfl:      oxyCODONE (ROXICODONE) 5 MG tablet, Take 0.5 tablets (2.5 mg) by mouth every 8 hours as needed for moderate to severe pain, Disp: 15 tablet, Rfl: 0     polyethylene glycol (MIRALAX/GLYCOLAX) powder, Take 1 capful by mouth daily as needed for constipation, Disp: , Rfl:      potassium chloride ER 20 MEQ PO CR tablet, Take 1 tablet (20 mEq) by mouth daily, Disp: 90 tablet, Rfl: 3     Skin Protectants, Misc. (EUCERIN) cream, Apply topically every hour as needed for dry skin (Patient not taking: Reported on 6/25/2020), Disp: 120 g, Rfl: 0      ROS: 10 point ROS neg other than the symptoms noted above in the HPI.    Impression.  I had a careful discussion today with the patient on the telephone.  He is still in convalescence at [facility], he however states he is voiding satisfactorily although he is getting to the bathroom only with the help of staff and a walker.  He has not observed blood in the urine, he is not troubled too much during the night, the stream seems to be initiated satisfactorily and is had no evidence of urinary tract infection or incontinence.  Apparently the bladder scanner at [facility] has not been working well so they have not been able to do estimations of residual volume but by all accounts based on my discussion today the patient is voiding well with few problems.  Therefore I do not think we need to initiate any further treatment at the present time.  Clearly if there is problems in the near future, we will need to be informed.  Otherwise, I would recommend I see him in the office in 3 months for cystoscopy to evaluate for intravesical obstruction and assess his progress.  I carefully  "discussed the situation with him in detail.  I answered all his questions.    Plan.  Cystoscopy in 3 months.      Phone call duration: 12 minutes    It is a pleasure to coordinate this very pleasant 74-year-old gentleman in telephone consultation today.  He was seen in the hospital initially.  \"This dictation was performed with voice recognition software and may contain errors,  omissions and inadvertent word substitution.\"  "

## 2020-06-29 NOTE — NURSING NOTE
Chief Complaint   Patient presents with     New Patient     follow up from hospital discuss urinary retention      Patient does not know his medication list. He is at [place].     Trang Shay CMA

## 2020-06-30 ENCOUNTER — NURSING HOME VISIT (OUTPATIENT)
Dept: GERIATRICS | Facility: CLINIC | Age: 75
End: 2020-06-30
Payer: MEDICARE

## 2020-06-30 VITALS
RESPIRATION RATE: 17 BRPM | SYSTOLIC BLOOD PRESSURE: 101 MMHG | HEART RATE: 68 BPM | DIASTOLIC BLOOD PRESSURE: 66 MMHG | HEIGHT: 67 IN | BODY MASS INDEX: 22.54 KG/M2 | OXYGEN SATURATION: 95 % | WEIGHT: 143.6 LBS | TEMPERATURE: 98.5 F

## 2020-06-30 DIAGNOSIS — R33.9 URINARY RETENTION: ICD-10-CM

## 2020-06-30 DIAGNOSIS — I35.0 NONRHEUMATIC AORTIC (VALVE) STENOSIS: ICD-10-CM

## 2020-06-30 DIAGNOSIS — R13.12 OROPHARYNGEAL DYSPHAGIA: ICD-10-CM

## 2020-06-30 DIAGNOSIS — D62 ANEMIA DUE TO BLOOD LOSS, ACUTE: ICD-10-CM

## 2020-06-30 DIAGNOSIS — F32.A DEPRESSION, UNSPECIFIED DEPRESSION TYPE: ICD-10-CM

## 2020-06-30 DIAGNOSIS — I48.20 CHRONIC A-FIB (H): ICD-10-CM

## 2020-06-30 DIAGNOSIS — K52.831 COLLAGENOUS COLITIS: ICD-10-CM

## 2020-06-30 DIAGNOSIS — I10 BENIGN ESSENTIAL HYPERTENSION: ICD-10-CM

## 2020-06-30 DIAGNOSIS — T14.8XXA HEMATOMA: ICD-10-CM

## 2020-06-30 DIAGNOSIS — R53.81 PHYSICAL DECONDITIONING: ICD-10-CM

## 2020-06-30 DIAGNOSIS — R63.4 WEIGHT LOSS: Primary | ICD-10-CM

## 2020-06-30 DIAGNOSIS — I25.10 CORONARY ARTERY DISEASE INVOLVING NATIVE CORONARY ARTERY OF NATIVE HEART WITHOUT ANGINA PECTORIS: ICD-10-CM

## 2020-06-30 PROCEDURE — 99309 SBSQ NF CARE MODERATE MDM 30: CPT | Performed by: NURSE PRACTITIONER

## 2020-06-30 ASSESSMENT — MIFFLIN-ST. JEOR: SCORE: 1340

## 2020-06-30 NOTE — PROGRESS NOTES
[facility]  [facility] Medical Record Number:  [id number]  Place of Service where encounter took place:  [facility] [[id number]]  Chief Complaint   Patient presents with     RECHECK       HPI:    Efrem Ramos  is a 74 year old (1945), who is being seen today for an episodic care visit.  HPI information obtained from: facility chart records, facility staff, patient report and [facility] chart review.   He came to this facility 6/4/2020 for short term rehab and medical management following hospitalization 5/18/2020 due to   hemorrhagic shock secondary to cath site bleeding and a massive hematoma of his left groin post elective  right coronary artery stenting X 4, in anticipation of TAVR. Eliquis had been held 3 days prior to the procedure. CTA abdomen/pelvis showed active extravasation from the left common femoral artery into the left groin with extensive hemorrhage. Vascular Surgery was consulted and hemostasis was achieved with direct US guided pressure. He required pressor support and received a total of 4 units of PRBC. IV iron was given X 2. Hgb stabilized in the mid 8s. Eliquis is on hold pending Cardiology follow up. ASA/Plavix continued given recent PCI. Follow up US 5/29/2020 showed no recurrent pseudoaneurysm, residual left groin hematoma was noted.   Urology consulted for scrotal edema and recommended support and elevation. Tamsulosin was started for urinary retention with high PVRs.   Emergent cardiac cath was done 5/28/2020 due to acute hypotension, leukocytosis, tachycardia and elevated lactate of 3.3.  Metoprolol was decreased and digoxin was started. He had transient tachycardia in the 120s with ambulation, HR improved at rest. SBP intermittently dropped to the 80-90s, but he remained asymptomatic.   CXR 5/29/2020 showed new bibasilar interstitial infiltrates suggesting infection or edema. Given elevated lactate and WBC 12.4, he was treated with  "vancomycin and ceftriaxone. Discharged on levaquin for 3 days. Blood cultures no growth. CT head was done 5/29/2020 for altered level of consciousness and showed diffuse cerebral volume loss and changes consistent with small vessel ischemic disease, no acute pathology. SPEECH THERAPY evaluated for dysphagia and recommended DD2 with thin liquids.   At discharge: WBC 12.0, Hgb 8.8, creatinine 0.91.     Today's concern is:     Weight loss-reports his appetite remains poor. Denies GI symptoms, \"just not hungry.\" Weight has been stable at 143-145 lbs for the past 2 weeks.   Oropharyngeal dysphagia-tolerating DD3 with thin. Denies coughing or choking with meals.   Depression, unspecified depression type-report his mood is \"maybe a little better.\" Sleeping well.   Anemia due to blood loss, acute  Hematoma-  Coronary artery disease involving native coronary artery of native heart without angina pectoris-denies cough, shortness of breath or chest pain   Nonrheumatic aortic (valve) stenosis  Benign essential hypertension-BPs: 108/66, 93/62, 93/60   Chronic a-fib (H)-HR: 62-87  Urinary retention-voiding has improved and PVRs have been within range. Denies pain or burning. Scrotal edema and pain have improved.   Collagenous colitis-reports loose stools are unchanged from usual   Physical deconditioning-ambulating 180 ft with walker and stand by assist. Able to do 12 stairs with hand rails and stand by assist. Requires supervision to contact guard assist with cares.   SLUMS 24/30     Past Medical and Surgical History reviewed in Epic today.    MEDICATIONS:    Current Outpatient Medications   Medication Sig Dispense Refill     acetaminophen (TYLENOL) 325 MG tablet Take 650 mg by mouth 3 times daily Plus 650 mg bid prn       aspirin (ASA) 81 MG EC tablet Take 1 tablet (81 mg) by mouth daily Start tomorrow morning. 90 tablet 3     atorvastatin (LIPITOR) 40 MG tablet Take 1 tablet (40 mg) by mouth daily 90 tablet 3     calcium " carbonate 600 mg-vitamin D 400 units (CALTRATE) 600-400 MG-UNIT per tablet Take 1 tablet by mouth 2 times daily       clopidogrel (PLAVIX) 75 MG tablet Take 1 tablet (75 mg) by mouth daily 90 tablet 3     Cyanocobalamin 2000 MCG TABS Take 2,000 mcg by mouth every 7 days On Sundays       digoxin (LANOXIN) 125 MCG tablet Take 125 mcg by mouth daily       gabapentin (NEURONTIN) 300 MG capsule Take 2 capsules (600 mg) by mouth every evening 60 capsule 5     loperamide (IMODIUM) 2 MG capsule Take 2 mg by mouth 4 times daily as needed for diarrhea       melatonin 1 MG TABS tablet Take 3 mg by mouth nightly as needed for sleep        metoprolol tartrate (LOPRESSOR) 25 MG tablet Take 0.5 tablets (12.5 mg) by mouth 2 times daily Hold for SBP<100 or HR<60 60 tablet 0     mirtazapine (REMERON) 7.5 MG tablet Take 15 mg by mouth At Bedtime        multivitamin (OCUVITE) TABS Take 1 tablet by mouth 2 times daily        nitroGLYcerin (NITROSTAT) 0.4 MG sublingual tablet For chest pain place 1 tablet under the tongue every 5 minutes for 3 doses. If symptoms persist 5 minutes after 1st dose call 911. (Patient not taking: Reported on 6/25/2020) 20 tablet 0     ondansetron (ZOFRAN-ODT) 4 MG ODT tab Take 4 mg by mouth 3 times daily Prior to meals       oxyCODONE (ROXICODONE) 5 MG tablet Take 0.5 tablets (2.5 mg) by mouth every 8 hours as needed for moderate to severe pain 15 tablet 0     polyethylene glycol (MIRALAX/GLYCOLAX) powder Take 1 capful by mouth daily as needed for constipation       potassium chloride ER 20 MEQ PO CR tablet Take 1 tablet (20 mEq) by mouth daily 90 tablet 3     Skin Protectants, Misc. (EUCERIN) cream Apply topically every hour as needed for dry skin (Patient not taking: Reported on 6/25/2020) 120 g 0         REVIEW OF SYSTEMS:  4 point ROS including Respiratory, CV, GI and , other than that noted in the HPI,  is negative    Objective:  /66   Pulse 68   Temp 98.5  F (36.9  C)   Resp 17   Ht 1.702 m  "(5' 7\")   Wt 65.1 kg (143 lb 9.6 oz)   SpO2 95%   BMI 22.49 kg/m    Exam:  GENERAL APPEARANCE:  Alert, in no distress, thin  ENT:  [place]  EYES:  EOM normal, conjunctiva and lids normal  NECK:  No adenopathy,masses or thyromegaly  RESP:  respiratory effort and palpation of chest normal, lungs clear to auscultation , no respiratory distress  CV:  Palpation and auscultation of heart done, slightly irregular rhythm, rate normal,  no murmur, no edema, +2 pedal pulses  ABDOMEN:  soft, non-tender, no distension, no masses  M/S:   gait slow with walker. DAMIAN with good strength  SKIN:  fading hematoma over lower abdomen, groin and upper thighs. No erythema or warmth.   PSYCH:  oriented X 3, insight and judgement impaired, memory impaired , affect and mood normal    Labs:   Recent labs in [facility] reviewed by me today.     ASSESSMENT/PLAN:  (R63.4) Weight loss  (primary encounter diagnosis)  (R13.12) Oropharyngeal dysphagia  Comment: appetite remains poor, but weight has been stable for the past 2 weeks. Tolerating DD3 with thin liquids.   Plan: continue mirtazapine. Continue nutritional supplements. Continue SPEECH THERAPY, advance diet as tolerated.     (F32.9) Depression, unspecified depression type  Comment: mood appears better after mirtazapine was started. He's brighter and more talkative today.   He is eager to return home, but their home situation is worrisome as patient and his wife report there is no space for him to use a walker or have home care services. Their son lives with them and is a hoarder.  recently spoke with his wife who reports they are trying to clean the house.   Plan: continue mirtazapine. Onsite psychologist is involved.     (D62) Anemia due to blood loss, acute  (T14.8XXA) Hematoma  Comment: hematoma is resolving. Hgb improved at 10.8 on 6/22/2020.   Plan: follow Hgb.     (I25.10) Coronary artery disease involving native coronary artery of native heart without angina pectoris  (I35.0) " Nonrheumatic aortic (valve) stenosis  Comment: s/p complex PCI with rotational atherectomy and 4 stents to the proximal to distal RCA on 5/18/2020. Plan is for TAVR in the future, but his functional status and weight will need to improve.   Plan: continue ASA, Plavix. Continue metoprolol with parameters. Cardiology follow up as scheduled.     (I48.20) Chronic a-fib (H)  Comment: rate remains controlled on lower dose of digoxin. He was scheduled for a Cardiology virtual visit on 6/25/2020 that did not occur.   Plan: continue digoxin and metoprolol. Eliquis remains on hold pending Cardiology follow up-will consult with Cardiology re: resuming Eliquis.     (I10) Benign essential hypertension  Comment: soft BPs are unchanged and asymptomatic   Plan: continue metoprolol for rate control, with parameters. Avoid hypotension due to advanced age and fall risk.     (R33.9) Urinary retention  Comment: scrotal edema has improved. Retention appears resolved and recent PVRs have been within range. UC 6/23/2020 with <1000 mixed urogenital leti. He has a virtual visit with Dr Doan 6/29/2020 who recommended continuing tamsulosin and follow up in 4 weeks.   Plan: continue tamsulosin. Monitor symptoms.     (K52.831) Collagenous colitis  Comment: stools are loose at baseline. C diff was negative 6/12/2020.   Plan: monitor symptoms     (R53.81) Physical deconditioning  Comment: continues to progress in therapies   Plan: continue PHYSICAL THERAPY/OT. Goal is to return home with his wife and son, with home care services.       Electronically signed by:  FABI Fuentes CNP

## 2020-07-02 ENCOUNTER — HOSPITAL LABORATORY (OUTPATIENT)
Dept: OTHER | Facility: CLINIC | Age: 75
End: 2020-07-02

## 2020-07-02 NOTE — PROGRESS NOTES
[facility]  [place] Medical Record Number:  [id number]  Place of Service where encounter took place:  [facility] [[id number]]  Chief Complaint   Patient presents with     Nursing Home Acute       HPI:    Efrem Ramos  is a 74 year old (1945), who is being seen today for an episodic care visit.  HPI information obtained from: facility chart records, facility staff, patient report and [facility] chart review.   He came to this facility 6/4/2020 for short term rehab and medical management following hospitalization 5/18/2020 due to   hemorrhagic shock secondary to catheter site bleeding and a massive hematoma of his left groin post right coronary artery stenting X 4, in anticipation of TAVR. Eliquis had been held 3 days prior to the procedure. CTA abdomen/pelvis showed active extravasation from the left common femoral artery into the left groin with extensive hemorrhage. Vascular Surgery was consulted and hemostasis was achieved with direct US guided pressure. He required pressor support and received a total of 4 units of PRBC. IV iron was given X 2. Hgb stabilized in the mid 8s. Eliquis held pending Cardiology follow up. ASA/Plavix continued given recent PCI. Follow up US 5/29/2020 showed no recurrent pseudoaneurysm, residual left groin hematoma was noted.   Urology consulted for scrotal edema and recommended support and elevation. Tamsulosin was started for urinary retention with high PVRs.   Emergent cardiac cath was done 5/28/2020 due to acute hypotension, leukocytosis, tachycardia and elevated lactate of 3.3.  Metoprolol was decreased and digoxin was started. He had transient tachycardia in the 120s with ambulation, HR improved at rest. SBP intermittently dropped to the 80-90s, but he remained asymptomatic. CXR 5/29/2020 showed new bibasilar interstitial infiltrates suggesting infection or edema. Given elevated lactate and WBC 12.4, he was treated with vancomycin  and ceftriaxone. Discharged on levaquin for 3 days. Blood cultures no growth. CT head was done 5/29/2020 for altered level of consciousness and showed diffuse cerebral volume loss and changes consistent with small vessel ischemic disease, no acute pathology. SPEECH THERAPY evaluated for dysphagia and recommended DD2 with thin liquids.   At discharge: WBC 12.0, Hgb 8.8, creatinine 0.91.    Today's concern is:     Chronic a-fib (H)-HR: 66-92   Weight loss-reports his appetite remains poor. Denies GI symptoms. Weight has been stable around 143 lbs.   Oropharyngeal dysphagia  Depression, unspecified depression type  Anemia due to blood loss, acute  Hematoma  Coronary artery disease involving native coronary artery of native heart without angina pectoris-denies cough, shortness of breath or chest pain   Nonrheumatic aortic (valve) stenosis  Benign essential hypertension-BPs: 109/73, 103/70, 94/63   Urinary retention-voiding without difficulty   Collagenous colitis-no diarrhea, reports loose stools are usual   Physical deconditioning-ambulating 180 ft with walker and stand by assist. Able to do 12 stairs with hand rails and stand by assist. Requires supervision to contact guard assist with cares.   SLUMS 24/30       Past Medical and Surgical History reviewed in Epic today.    MEDICATIONS:    Current Outpatient Medications   Medication Sig Dispense Refill     apixaban ANTICOAGULANT (ELIQUIS) 5 MG tablet Take 5 mg by mouth 2 times daily       acetaminophen (TYLENOL) 325 MG tablet Take 650 mg by mouth 3 times daily Plus 650 mg bid prn       atorvastatin (LIPITOR) 40 MG tablet Take 1 tablet (40 mg) by mouth daily 90 tablet 3     calcium carbonate 600 mg-vitamin D 400 units (CALTRATE) 600-400 MG-UNIT per tablet Take 1 tablet by mouth 2 times daily       clopidogrel (PLAVIX) 75 MG tablet Take 1 tablet (75 mg) by mouth daily 90 tablet 3     Cyanocobalamin 2000 MCG TABS Take 2,000 mcg by mouth every 7 days On Sundays        "digoxin (LANOXIN) 125 MCG tablet Take 125 mcg by mouth daily       gabapentin (NEURONTIN) 300 MG capsule Take 2 capsules (600 mg) by mouth every evening 60 capsule 5     loperamide (IMODIUM) 2 MG capsule Take 2 mg by mouth 4 times daily as needed for diarrhea       melatonin 1 MG TABS tablet Take 3 mg by mouth nightly as needed for sleep        metoprolol tartrate (LOPRESSOR) 25 MG tablet Take 0.5 tablets (12.5 mg) by mouth 2 times daily Hold for SBP<100 or HR<60 60 tablet 0     mirtazapine (REMERON) 7.5 MG tablet Take 15 mg by mouth At Bedtime        multivitamin (OCUVITE) TABS Take 1 tablet by mouth 2 times daily        nitroGLYcerin (NITROSTAT) 0.4 MG sublingual tablet For chest pain place 1 tablet under the tongue every 5 minutes for 3 doses. If symptoms persist 5 minutes after 1st dose call 911. (Patient not taking: Reported on 6/25/2020) 20 tablet 0     ondansetron (ZOFRAN-ODT) 4 MG ODT tab Take 4 mg by mouth 3 times daily Prior to meals       polyethylene glycol (MIRALAX/GLYCOLAX) powder Take 1 capful by mouth daily as needed for constipation       potassium chloride ER 20 MEQ PO CR tablet Take 1 tablet (20 mEq) by mouth daily 90 tablet 3     Skin Protectants, Misc. (EUCERIN) cream Apply topically every hour as needed for dry skin (Patient not taking: Reported on 6/25/2020) 120 g 0         REVIEW OF SYSTEMS:  4 point ROS including Respiratory, CV, GI and , other than that noted in the HPI,  is negative    Objective:  /73   Pulse 89   Temp 98.6  F (37  C)   Resp 18   Ht 1.702 m (5' 7\")   Wt 64.4 kg (142 lb)   SpO2 96%   BMI 22.24 kg/m    Exam:  GENERAL APPEARANCE:  Alert, in no distress, thin  ENT:  [place]  EYES:  EOM normal, conjunctiva and lids normal  NECK:  No adenopathy,masses or thyromegaly  RESP:  respiratory effort and palpation of chest normal, lungs clear to auscultation , no respiratory distress  CV:  Palpation and auscultation of heart done, slightly irregular rhythm, rate normal,  no " murmur, no edema, +2 pedal pulses  ABDOMEN:  soft, non-tender, no distension, no masses  M/S:   in bed. DAMIAN with good strength  SKIN:  fading hematoma over lower abdomen, groin and upper thighs, indurated areas are soft,  No erythema or warmth.   PSYCH:  oriented X 3, insight and judgement impaired, memory impaired , affect and mood normal    Labs:   Recent labs in [facility] reviewed by me today.     ASSESSMENT/PLAN:  (I48.20) Chronic a-fib (H)  (primary encounter diagnosis)  Comment: rate controlled. He missed a Cardiology virtual visit last week. Hgb has stabilized, no signs of bleeding.   Discussed resuming apixaban with Pedro Grimes NP at Cardiology, who agrees ok to resume.   Plan: resume apixaban 5 mg bid. Discontinue ASA. Continue metoprolol and digoxin. Monitor VS. Cardiology virtual visit 7/10/2020.     (R63.4) Weight loss  (R13.12) Oropharyngeal dysphagia  Comment: oral intake remains fairly poor, but weight has stabilized. No acute GI symptoms. No swallow issues. Tolerating DD3 with thin liquids.   Plan:continue mirtazapine. Continue nutritional supplements.      (F32.9) Depression, unspecified depression type  Comment: mirtazapine was started 6/17/2020 for depression and poor appetite. He's brighter and more interactive and agrees that his mood has improved. Onsite psychologist is involved.   Plan: continue mirtazapine.     (D62) Anemia due to blood loss, acute  (T14.8XXA) Hematoma  Comment: hematoma continues to resolved, areas of induration are softer and smaller. Hgb improved at 10.8   Plan: CBC 7/7/2020    (I25.10) Coronary artery disease involving native coronary artery of native heart without angina pectoris  (I35.0) Nonrheumatic aortic (valve) stenosis  Comment: s/p complex PCI with rotational atherectomy and 4 stents to the proximal to distal RCA on 5/18/2020. Plan is for TAVR in the future, but his functional status and weight will need to improve.   Plan: continue ASA, Plavix. Continue  metoprolol with parameters. Cardiology follow up as scheduled.     (I10) Benign essential hypertension  Comment: soft BPs are unchanged and asymptomatic   Plan: continue metoprolol for rate control, with parameters. Avoid hypotension due to advanced age and fall risk.     (R33.9) Urinary retention  Comment: scrotal edema has improved. Retention appears resolved and recent PVRs were within range. UC 6/23/2020 with <1000 mixed urogenital leti. He had a virtual visit with Dr Doan 6/29/2020 who recommended continuing tamsulosin and follow up in 4 weeks.   Plan: continue tamsulosin. Monitor symptoms    (K52.831) Collagenous colitis  Comment: chronic. C diff negative 6/12/2020  Plan: monitor     (R53.81) Physical deconditioning  Comment: progressing in therapies   Plan: continue PHYSICAL THERAPY/OT. Goal is to return home with his wife and son, probably next week.       Electronically signed by:  FABI Fuentes CNP

## 2020-07-03 ENCOUNTER — NURSING HOME VISIT (OUTPATIENT)
Dept: GERIATRICS | Facility: CLINIC | Age: 75
End: 2020-07-03
Payer: MEDICARE

## 2020-07-03 VITALS
BODY MASS INDEX: 22.29 KG/M2 | SYSTOLIC BLOOD PRESSURE: 109 MMHG | DIASTOLIC BLOOD PRESSURE: 73 MMHG | HEART RATE: 89 BPM | WEIGHT: 142 LBS | TEMPERATURE: 98.6 F | HEIGHT: 67 IN | RESPIRATION RATE: 18 BRPM | OXYGEN SATURATION: 96 %

## 2020-07-03 DIAGNOSIS — K52.831 COLLAGENOUS COLITIS: ICD-10-CM

## 2020-07-03 DIAGNOSIS — F32.A DEPRESSION, UNSPECIFIED DEPRESSION TYPE: ICD-10-CM

## 2020-07-03 DIAGNOSIS — I25.10 CORONARY ARTERY DISEASE INVOLVING NATIVE CORONARY ARTERY OF NATIVE HEART WITHOUT ANGINA PECTORIS: ICD-10-CM

## 2020-07-03 DIAGNOSIS — R33.9 URINARY RETENTION: ICD-10-CM

## 2020-07-03 DIAGNOSIS — I10 BENIGN ESSENTIAL HYPERTENSION: ICD-10-CM

## 2020-07-03 DIAGNOSIS — R63.4 WEIGHT LOSS: ICD-10-CM

## 2020-07-03 DIAGNOSIS — I48.20 CHRONIC A-FIB (H): Primary | ICD-10-CM

## 2020-07-03 DIAGNOSIS — T14.8XXA HEMATOMA: ICD-10-CM

## 2020-07-03 DIAGNOSIS — R13.12 OROPHARYNGEAL DYSPHAGIA: ICD-10-CM

## 2020-07-03 DIAGNOSIS — D62 ANEMIA DUE TO BLOOD LOSS, ACUTE: ICD-10-CM

## 2020-07-03 DIAGNOSIS — R53.81 PHYSICAL DECONDITIONING: ICD-10-CM

## 2020-07-03 DIAGNOSIS — I35.0 NONRHEUMATIC AORTIC (VALVE) STENOSIS: ICD-10-CM

## 2020-07-03 PROCEDURE — 99309 SBSQ NF CARE MODERATE MDM 30: CPT | Performed by: NURSE PRACTITIONER

## 2020-07-03 ASSESSMENT — MIFFLIN-ST. JEOR: SCORE: 1332.74

## 2020-07-06 LAB
COVID-19 VIRUS PCR TO MAYO - RESULT: NORMAL
SPECIMEN SOURCE: NORMAL

## 2020-07-07 ENCOUNTER — HOSPITAL LABORATORY (OUTPATIENT)
Dept: OTHER | Facility: CLINIC | Age: 75
End: 2020-07-07

## 2020-07-07 LAB
ANION GAP SERPL CALCULATED.3IONS-SCNC: 5 MMOL/L (ref 3–14)
BUN SERPL-MCNC: 11 MG/DL (ref 7–30)
CALCIUM SERPL-MCNC: 8.7 MG/DL (ref 8.5–10.1)
CHLORIDE SERPL-SCNC: 109 MMOL/L (ref 94–109)
CO2 SERPL-SCNC: 25 MMOL/L (ref 20–32)
CREAT SERPL-MCNC: 0.82 MG/DL (ref 0.66–1.25)
ERYTHROCYTE [DISTWIDTH] IN BLOOD BY AUTOMATED COUNT: 15.8 % (ref 10–15)
GFR SERPL CREATININE-BSD FRML MDRD: 85 ML/MIN/{1.73_M2}
GLUCOSE SERPL-MCNC: 78 MG/DL (ref 70–99)
HCT VFR BLD AUTO: 35.8 % (ref 40–53)
HGB BLD-MCNC: 11.5 G/DL (ref 13.3–17.7)
MCH RBC QN AUTO: 31.3 PG (ref 26.5–33)
MCHC RBC AUTO-ENTMCNC: 32.1 G/DL (ref 31.5–36.5)
MCV RBC AUTO: 98 FL (ref 78–100)
PLATELET # BLD AUTO: 480 10E9/L (ref 150–450)
POTASSIUM SERPL-SCNC: 3.7 MMOL/L (ref 3.4–5.3)
RBC # BLD AUTO: 3.67 10E12/L (ref 4.4–5.9)
SODIUM SERPL-SCNC: 139 MMOL/L (ref 133–144)
WBC # BLD AUTO: 10.9 10E9/L (ref 4–11)

## 2020-07-08 NOTE — PROGRESS NOTES
[facility] DISCHARGE SUMMARY  PATIENT'S NAME: Efrem Ramos  YOB: 1945  MEDICAL RECORD NUMBER: [id number]  Place of Service where encounter took place[facility] [[id number]]    PRIMARY CARE PROVIDER AND CLINIC RESPONSIBLE AFTER TRANSFER: Danny Paige MD, MD, [address] / [place] [address]; Phone: [phone]; Fax: [phone]; [facility]     Transferring providers: FABI Waddell CNP; Jaycob Naqvi MD  Recent Hospitalization/ED:  [facility] stay 05/18/20 to 06/04/20.  Date of SNF Admission: June / 04 / 2020  Date of SNF (anticipated) Discharge: July / 11 / 2020  Discharged to: previous home with wife   Cognitive Scores: SLUMS: 24/30 and Safety Questionnaire: 17/20  DME: Walker    CODE STATUS/ADVANCE DIRECTIVES DISCUSSION: Full Code   ALLERGIES: Sulfa drugs; Adhesive tape; Amiodarone; and Penicillins    DISCHARGE DIAGNOSIS/NURSING FACILITY COURSE:   He came to this facility 6/4/2020 for short term rehab and medical management following hospitalization 5/18/2020 due to   hemorrhagic shock secondary to catheter site bleeding and a massive hematoma of his left groin post right coronary artery stenting X 4, in anticipation of TAVR. Eliquis had been held 3 days prior to the procedure. CTA abdomen/pelvis showed active extravasation from the left common femoral artery into the left groin with extensive hemorrhage. Vascular Surgery was consulted and hemostasis was achieved with direct US guided pressure. He required pressor support and received a total of 4 units of PRBC. IV iron was given X 2. Hgb stabilized in the mid 8s. Eliquis was held pending Cardiology follow up. ASA/Plavix continued given recent PCI. Follow up US 5/29/2020 showed no recurrent pseudoaneurysm, residual left groin hematoma was noted.   Urology consulted for scrotal edema and recommended support and elevation. Tamsulosin was started  for urinary retention with high PVRs.   Emergent cardiac cath was done 5/28/2020 due to acute hypotension, leukocytosis, tachycardia and elevated lactate of 3.3.  Metoprolol was decreased and digoxin was started. He had transient tachycardia in the 120s with ambulation, HR improved at rest. SBP intermittently dropped to the 80-90s, but he remained asymptomatic. CXR 5/29/2020 showed new bibasilar interstitial infiltrates suggesting infection or edema. Given elevated lactate and WBC 12.4, he was treated with vancomycin and ceftriaxone. Discharged on levaquin for 3 days. Blood cultures no growth. CT head was done 5/29/2020 for altered level of consciousness and showed diffuse cerebral volume loss and changes consistent with small vessel ischemic disease, no acute pathology. SPEECH THERAPY evaluated for dysphagia and recommended DD2 with thin liquids. At hospital discharge: WBC 12.0, Hgb 8.8, creatinine 0.91.    He has worked with PHYSICAL THERAPY and OT with slow, but ultimately, good progress. Ambulating 150 ft with walker and stand by assist. Able to do 12 stairs with hand rails and stand by assist. TUG 34, indicating high fall risk.   ASSESSMENT / PLAN:  (D62) Anemia due to blood loss, acute  (primary encounter diagnosis)  (T14.8XXA) Hematoma  Comment: hematoma is resolving. Hgb improved at 11.5 on 7/7/2020. He is feeling well and ready to return home.   Unfortunately, he and his wife decline all home care services, stating that there is no room for someone to come into their home. Their son is a hoarder, but has been trying to clean out the house.   will be filing a [facility] report due to concerns about home safety.   Plan: discharge home with family as planned. Follow up appts as below.     (I48.20) Chronic a-fib (H)  Comment: rate controlled: 79-90. Digoxin level was 2.0 on 6/17/2020 and dose was decreased, per consultation with Cardiology. Eliquis was resumed and ASA discontinued 7/3/2020, also per  consultation with Cardiology.   Metoprolol dose is limited by hypotension.   Plan: continue low dose metoprolol, digoxin and apixaban.     (I25.10) Coronary artery disease involving native coronary artery of native heart without angina pectoris  (I35.0) Nonrheumatic aortic (valve) stenosis  Comment: s/p complex PCI with rotational atherectomy and 4 stents to the proximal to distal RCA on 5/18/2020. Awaiting TAVR after he recuperates.   Plan: continue Plavix and metoprolol. Cardiology follow up 7/10/2020.     (F32.9) Depression, unspecified depression type  Comment: mood and motivation have been poor, but he has been brighter and more interactive after mirtazapine was started 6/17/2020. The onsite psychologist has been involved.   Plan: continue mirtazapine.     (R63.4) Weight loss  Comment: he has struggled with oral intake throughout the tcu stay. Mirtazapine was started with some improvement in both appetite and mood. Weight has been stable at 141-145 lbs for the past 3 weeks. His weigh on admission was 155 lbs.   Plan: continue mirtazapine. Continue nutritional supplements.     (R13.12) Oropharyngeal dysphagia  Comment: video swallow done 5/31/2020 showed trace penetration with thin barium, no aspiration. Diet has been advanced to DD3 (due to dentition), which he is tolerating well.   Plan: continue food textures as tolerated with thin liquids.     (J18.9) Pneumonia of both lower lobes due to infectious organism  Comment: completed levaquin 6/7/2020 and infection appears resolved.   Plan: monitor respiratory status.     (I10) Benign essential hypertension  Comment: soft BPs are unchanged and asymptomatic. Recent readings: 91/59, 105/69, 92/65   Plan: continue low dose metoprolol for rate control. Avoid hypotension due to advanced age and fall risk.     (K52.831) Collagenous colitis  Comment: stools are loose at baseline and unchanged. C diff was negative 6/12/2020.   Plan: monitor symptoms     (R33.9) Urinary  retention   Comment: retention resolved as his scrotal edema improved. No urinary symptoms at present. He had a telephone visit with Dr Doan who recommended continuing tamsulosin and following up in 4 weeks. UC 6/23/2020 with <1000 mixed urogenital leti.   Plan: continue tamsulosin. Urology follow up as scheduled.     (G47.33) MEL (obstructive sleep apnea)  Comment: he has not used his CPAP while on tcu and states that he hasn't used it for several months prior to hospitalization   Plan: encouraged him to use his CPAP regularly at home     RLS  Comment: chronic, managed  Plan: continue gabapentin.     (R53.81) Physical deconditioning  Comment: progress in therapies as above   Plan: he and his wife decline home services     Past Medical History:  has a past medical history of Atrial fibrillation (H), Atrial flutter (H), Benign essential hypertension (11/20/2018), Cancer (H) (vocal cord), Carpal tunnel syndrome, Coronary artery disease involving native coronary artery of native heart without angina pectoris (5/8/2020), CVA (cerebral infarction) (5/5/2015), Demyelinating disease of central nervous system, unspecified (H), Dyspnea, ENCEPHALOPATHY UNSPECIFIED  (3/15/2005), Femoral artery hematoma complicating cardiac catheterization, History of thrombophlebitis, Mitral valve problem (8/18/2013), Mixed hyperlipidemia (3/15/2005), Nonrheumatic mitral valve stenosis, Other and unspecified noninfectious gastroenteritis and colitis(558.9), Pneumonia (8/17/2016), PVD (peripheral vascular disease) (H), Redundant colon, Shingles, SKIN DISORDERS NEC (3/15/2005), Sleep apnea, and SVT (supraventricular tachycardia) (H). He also has no past medical history of Arthritis, Asthma, Asymptomatic human immunodeficiency virus (HIV) infection status (H), Blood in semen, Cerebral palsy (H), Complication of anesthesia, Congenital renal agenesis and dysgenesis, Congestive heart failure, unspecified, Diabetes mellitus (H), Epididymitis,  bilateral, Epididymitis, left, Epididymitis, right, Goiter, Gout, Hernia, abdominal, History of spinal cord injury, Horseshoe kidney, Hydrocephalus (H), Malignant hyperthermia, Mumps, Orchitis, epididymitis, and epididymo-orchitis, with abscess, Palpitations, Parkinsons disease (H), Penile discharge, Prostate infection, Spider veins, Spina bifida (H), STD (sexually transmitted disease), Swelling of testicle, Tethered cord (H), Thyroid disease, or Tuberculosis.    Discharge Medications:    Current Outpatient Medications   Medication Sig Dispense Refill     apixaban ANTICOAGULANT (ELIQUIS) 5 MG tablet Take 1 tablet (5 mg) by mouth 2 times daily 60 tablet 0     atorvastatin (LIPITOR) 40 MG tablet Take 1 tablet (40 mg) by mouth daily 30 tablet 0     clopidogrel (PLAVIX) 75 MG tablet Take 1 tablet (75 mg) by mouth daily 30 tablet 0     digoxin (LANOXIN) 125 MCG tablet Take 1 tablet (125 mcg) by mouth daily 30 tablet 0     gabapentin (NEURONTIN) 300 MG capsule Take 2 capsules (600 mg) by mouth every evening 60 capsule 0     metoprolol tartrate (LOPRESSOR) 25 MG tablet Take 0.5 tablets (12.5 mg) by mouth 2 times daily 60 tablet 0     mirtazapine (REMERON) 15 MG tablet Take 1 tablet (15 mg) by mouth At Bedtime 30 tablet 0     nitroGLYcerin (NITROSTAT) 0.4 MG sublingual tablet For chest pain place 1 tablet under the tongue every 5 minutes for 3 doses. If symptoms persist 5 minutes after 1st dose call 911. 20 tablet 0     potassium chloride ER (K-TAB) 20 MEQ CR tablet Take 1 tablet (20 mEq) by mouth daily 30 tablet 0     acetaminophen (TYLENOL) 325 MG tablet Take 650 mg by mouth 3 times daily Plus 650 mg bid prn       calcium carbonate 600 mg-vitamin D 400 units (CALTRATE) 600-400 MG-UNIT per tablet Take 1 tablet by mouth 2 times daily       Cyanocobalamin 2000 MCG TABS Take 2,000 mcg by mouth every 7 days On Sundays       multivitamin (OCUVITE) TABS Take 1 tablet by mouth 2 times daily        Medication Changes/Rationale:  "    Levaquin completed 6/7/2020    Mirtazapine started 6/17 and dose increased 6/23/2020    Tylenol scheduled for pain    Digoxin dose decreased 6/17/2020    Apixaban restarted and ASA discontinued 7/3/2020    Zofran prn nausea-discontinued when no longer used     Melatonin prn discontinued, not used    Bowel regimen adjusted     Oxycodone was discontinued     Controlled medications sent with patient:   not applicable/none     ROS:   4 point ROS including Respiratory, CV, GI and , other than that noted in the HPI,  is negative    Physical Exam:   Vitals: BP 91/59   Pulse 86   Temp 98  F (36.7  C)   Resp 16   Ht 1.702 m (5' 7\")   Wt 64.3 kg (141 lb 12.8 oz)   SpO2 94%   BMI 22.21 kg/m    BMI= Body mass index is 22.21 kg/m .  GENERAL APPEARANCE:  Alert, in no distress, thin  ENT:  [place]  EYES:  EOM normal, conjunctiva and lids normal  NECK:  No adenopathy,masses or thyromegaly  RESP:  respiratory effort and palpation of chest normal, lungs clear to auscultation , no respiratory distress  CV:  Palpation and auscultation of heart done, irregular rhythm, rate normal,  no murmur, no edema, +2 pedal pulses  ABDOMEN:  soft, non-tender, no distension, no masses  M/S:   in bed. DAMIAN with good strength  SKIN:  fading hematoma over lower abdomen, groin and upper thighs, indurated areas are soft,  No erythema or warmth.   PSYCH:  oriented X 3, insight and judgement impaired, memory impaired , affect and mood normal    SNF labs: Labs done in SNF are in [place] EPIC. Please refer to them using EPIC/Care Everywhere.      DISCHARGE PLAN:    Follow up labs: per PCP    Medical Follow Up: Follow up with primary care provider within 1-2 weeks     Follow up with Cardiology 7/10/2020    MT referral needed and placed by this provider: No    Discharge Services: patient and wife declined     Discharge Instructions Verbalized to Patient at Discharge:     Continue nutritional supplements       TOTAL DISCHARGE TIME:   Greater than 30 " minutes  Electronically signed by:  FABI Fuentes CNP

## 2020-07-09 ENCOUNTER — NURSING HOME VISIT (OUTPATIENT)
Dept: GERIATRICS | Facility: CLINIC | Age: 75
End: 2020-07-09
Payer: MEDICARE

## 2020-07-09 VITALS
TEMPERATURE: 98 F | WEIGHT: 141.8 LBS | RESPIRATION RATE: 16 BRPM | DIASTOLIC BLOOD PRESSURE: 59 MMHG | SYSTOLIC BLOOD PRESSURE: 91 MMHG | HEIGHT: 67 IN | OXYGEN SATURATION: 94 % | BODY MASS INDEX: 22.26 KG/M2 | HEART RATE: 86 BPM

## 2020-07-09 DIAGNOSIS — I25.10 CORONARY ARTERY DISEASE INVOLVING NATIVE CORONARY ARTERY OF NATIVE HEART WITHOUT ANGINA PECTORIS: ICD-10-CM

## 2020-07-09 DIAGNOSIS — I10 BENIGN ESSENTIAL HYPERTENSION: ICD-10-CM

## 2020-07-09 DIAGNOSIS — G25.81 RESTLESS LEGS SYNDROME (RLS): ICD-10-CM

## 2020-07-09 DIAGNOSIS — G47.33 OSA (OBSTRUCTIVE SLEEP APNEA): ICD-10-CM

## 2020-07-09 DIAGNOSIS — T14.8XXA HEMATOMA: ICD-10-CM

## 2020-07-09 DIAGNOSIS — R63.4 WEIGHT LOSS: ICD-10-CM

## 2020-07-09 DIAGNOSIS — I35.0 NONRHEUMATIC AORTIC (VALVE) STENOSIS: ICD-10-CM

## 2020-07-09 DIAGNOSIS — I48.20 CHRONIC A-FIB (H): ICD-10-CM

## 2020-07-09 DIAGNOSIS — D62 ANEMIA DUE TO BLOOD LOSS, ACUTE: Primary | ICD-10-CM

## 2020-07-09 DIAGNOSIS — R13.12 OROPHARYNGEAL DYSPHAGIA: ICD-10-CM

## 2020-07-09 DIAGNOSIS — J18.9 PNEUMONIA OF BOTH LOWER LOBES DUE TO INFECTIOUS ORGANISM: ICD-10-CM

## 2020-07-09 DIAGNOSIS — R33.9 URINARY RETENTION: ICD-10-CM

## 2020-07-09 DIAGNOSIS — R53.81 PHYSICAL DECONDITIONING: ICD-10-CM

## 2020-07-09 DIAGNOSIS — F32.A DEPRESSION, UNSPECIFIED DEPRESSION TYPE: ICD-10-CM

## 2020-07-09 DIAGNOSIS — K52.831 COLLAGENOUS COLITIS: ICD-10-CM

## 2020-07-09 PROCEDURE — 99316 NF DSCHRG MGMT 30 MIN+: CPT | Performed by: NURSE PRACTITIONER

## 2020-07-09 RX ORDER — METOPROLOL TARTRATE 25 MG/1
12.5 TABLET, FILM COATED ORAL 2 TIMES DAILY
COMMUNITY
End: 2020-07-09

## 2020-07-09 RX ORDER — GABAPENTIN 300 MG/1
600 CAPSULE ORAL EVERY EVENING
Qty: 60 CAPSULE | Refills: 0 | Status: SHIPPED | OUTPATIENT
Start: 2020-07-09 | End: 2020-09-14

## 2020-07-09 RX ORDER — CLOPIDOGREL BISULFATE 75 MG/1
75 TABLET ORAL DAILY
Qty: 30 TABLET | Refills: 0 | Status: SHIPPED | OUTPATIENT
Start: 2020-07-09 | End: 2020-08-07

## 2020-07-09 RX ORDER — ATORVASTATIN CALCIUM 40 MG/1
40 TABLET, FILM COATED ORAL DAILY
Qty: 30 TABLET | Refills: 0 | Status: SHIPPED | OUTPATIENT
Start: 2020-07-09 | End: 2020-08-07

## 2020-07-09 RX ORDER — POTASSIUM CHLORIDE 1500 MG/1
20 TABLET, EXTENDED RELEASE ORAL DAILY
Qty: 30 TABLET | Refills: 0 | Status: SHIPPED | OUTPATIENT
Start: 2020-07-09 | End: 2021-05-19

## 2020-07-09 RX ORDER — NITROGLYCERIN 0.4 MG/1
TABLET SUBLINGUAL
Qty: 20 TABLET | Refills: 0 | Status: SHIPPED | OUTPATIENT
Start: 2020-07-09 | End: 2021-02-24

## 2020-07-09 RX ORDER — METOPROLOL TARTRATE 25 MG/1
12.5 TABLET, FILM COATED ORAL 2 TIMES DAILY
Qty: 60 TABLET | Refills: 0 | Status: SHIPPED | OUTPATIENT
Start: 2020-07-09 | End: 2021-04-29

## 2020-07-09 RX ORDER — MIRTAZAPINE 15 MG/1
15 TABLET, FILM COATED ORAL AT BEDTIME
COMMUNITY
End: 2020-07-09

## 2020-07-09 RX ORDER — DIGOXIN 125 MCG
125 TABLET ORAL DAILY
Qty: 30 TABLET | Refills: 0 | Status: SHIPPED | OUTPATIENT
Start: 2020-07-09 | End: 2020-08-07

## 2020-07-09 RX ORDER — MIRTAZAPINE 15 MG/1
15 TABLET, FILM COATED ORAL AT BEDTIME
Qty: 30 TABLET | Refills: 0 | Status: SHIPPED | OUTPATIENT
Start: 2020-07-09 | End: 2020-08-07

## 2020-07-09 ASSESSMENT — MIFFLIN-ST. JEOR: SCORE: 1326.83

## 2020-07-09 NOTE — PLAN OF CARE
Neuro- A&O, forgetful  Most Recent Vitals- Temp: 98.5  F (36.9  C) Temp src: Oral BP: 111/59 Pulse: 121 Heart Rate: 101 Resp: 20 SpO2: 95 % O2 Device: Nasal cannula Oxygen Delivery: 2 LPM  Tele - AFib RVR  Cardiac- No chest pain reported, significant murmur   Resp- LS clear, no shortness of breath reported  O2- 2L for comfort   Activity- Ax1 with walker and gait belt   Pain- Scrotal pain related to hematoma-refused intervention   Drips- LR at 125 ml/hr  Drains/Tubes- Right IJ  Aggression Color- Green  Plan-  Patient and family deciding home vs tcu      Twila Sharp, RN     UTI start Bactrim DS twice a day x7 days urine CNS  A1c slightly elevated prediabetic 1800-calorie ADA diet  Triglycerides elevated  Add fenofibrate 145 mg daily  Lipid profile in 1 month

## 2020-07-10 ENCOUNTER — VIRTUAL VISIT (OUTPATIENT)
Dept: CARDIOLOGY | Facility: CLINIC | Age: 75
End: 2020-07-10
Payer: MEDICARE

## 2020-07-10 DIAGNOSIS — I35.0 SEVERE AORTIC STENOSIS: ICD-10-CM

## 2020-07-10 DIAGNOSIS — I10 BENIGN ESSENTIAL HYPERTENSION: ICD-10-CM

## 2020-07-10 DIAGNOSIS — I48.19 PERSISTENT ATRIAL FIBRILLATION (H): ICD-10-CM

## 2020-07-10 DIAGNOSIS — E78.2 MIXED HYPERLIPIDEMIA: ICD-10-CM

## 2020-07-10 DIAGNOSIS — I25.10 CORONARY ARTERY DISEASE INVOLVING NATIVE CORONARY ARTERY OF NATIVE HEART WITHOUT ANGINA PECTORIS: Primary | ICD-10-CM

## 2020-07-10 PROCEDURE — 99442 ZZC PHYSICIAN TELEPHONE EVALUATION 11-20 MIN: CPT | Performed by: NURSE PRACTITIONER

## 2020-07-10 RX ORDER — POLYETHYLENE GLYCOL 3350 17 G/17G
1 POWDER, FOR SOLUTION ORAL DAILY
COMMUNITY
End: 2021-08-30

## 2020-07-10 NOTE — LETTER
7/10/2020    Danny Paige MD, MD  [address]  [facility] [address]    RE: Efrem Ramos       Dear Colleague,    I had the pleasure of seeing Efrem Ramos in the [facility].      Cardiology Phone Visit Progress Note    Service Date: July 10, 2020    PRIMARY CARDIOLOGIST: Dr. Severo Xiong      REASON FOR VISIT: Hospital follow up    Mr. Efrem Ramos is a 75 year old male who is being evaluated via a billable telephone visit to minimize risk of exposure with the current COVID-19 pandemic.     I had the pleasure of speaking with Efrem Ramos today over the phone for follow up of his recent hospitalization. He is a very nice 75 year old male with a past medical history significant for obstructive sleep apnea, peripheral vascular disease, hyperlipidemia, prior CVA in 2015, atrial fibrillation, severe aortic stenosis, mild to moderate mitral valve stenosis, and coronary artery disease.  He was recently admitted to [facility] on 5/18/2020.  He underwent an elective coronary angiogram and RCA intervention as part of an outpatient TAVR work-up. Post procedurally he unfortunately developed persistent bleeding from his left femoral access site and subsequently developed hemorrhagic shock.     Unfortunately, he had quite a prolonged hospital stay of over 2 weeks. He had significant bleeding into the groin, scrotum, and penis. He required pressors temporarily following his coronary angiogram and gradually improved over time and with transfusions of IV iron and 4 units of PRBCs throughout his admission. He was discharged on aspirin and Plavix with a plan to restart Eliquis and stop aspirin once his anemia had improved and bleeding risk had subsided some. He was also discharged on to digoxin 250 mcg daily and metoprolol tartrate 12.5 mg for rate control with atrial fibrillation. He was discharged to the TCU at [place].    Following his  discharge, he has gradually build some of his strength back up working with rehab at the TCU. His hemoglobin has continued to gradually improve up to 11.5 earlier this week.  He was restarted on apixaban at 5 mg twice daily. Aspirin was stopped and he has continued on Plavix. Alfredo tells me that the swelling in his groin has improved and is now nearly resolved. He has significant pain in his groin with the swelling from the breathing and this has also now nearly completely resolved. He continues to have mild dyspnea on exertion when he is more active working with rehab. He otherwise denies chest pain, palpitations, orthopnea, PND, or lower extremity edema.     He has had a bit of a tough time with depression and low mood through this extended hospitalization and TCU stay, particularly with the current visitor restrictions due to COVID-19. His appetite is been a bit poor as well but has gradually been improving somewhat.  He is planning to be discharged home from the TCU in the next couple of days and he is looking forward to this.    ASSESSMENT AND PLAN:  1. Coronary artery disease   - Status post complex PCI with rotational arthrectomy and placement of 4 stents to the proximal to distal RCA on 5/18/2020 complicated by right femoral artery bleeding into the groin and postprocedural hemorrhagic shock.  - Stable without anginal symptoms. Continues on Plavix. He was previously on aspirin but this has subsequently been stopped to allow for reinitiation of apixaban for anticoagulation for stroke prophylaxis in the setting of persistent atrial fibrillation. His hemoglobin has gradually improved and is now stable at 11.5.    2. Severe aortic stenosis  - Aortic valve area of 0.8 cm , mean gradient of 32 mmHg in the setting of low stroke volume index of 23 mL/m  with LVEF 50 to 55%.  - He has been seen by the structural heart clinic with plan for TAVR. Will arrange follow-up with the structural heart clinic in the next couple  of weeks to discuss further work-up needed prior to proceeding with TAVR now that he seems to be recovering nicely and more stable following his recent prolonged hospitalization.    3. Mild mitral stenosis (severe MAC, mean gradient of 6.8 mmHg at heart rate 106 bpm)     4. Persistent atrial fibrillation  - Anticoagulated on apixaban 5 mg twice daily and rate controlled on digoxin 125 mcg daily and metoprolol tartrate 12.5 mg twice daily.    5. Hypertension, well-controlled    6. Hyperlipidemia  - Treated on atorvastatin 40 mg daily.    7. Obstructive sleep apnea, treated with CPAP    Thank you for the opportunity to participate in this pleasant patient's care.  As noted above, I will arrange for him to visit with the Structural Heart care team in follow up in the next couple of weeks to discuss any further workup needed prior to proceeding with the planned TAVR and to get this scheduled. We would be happy to see him sooner if needed for any concerns in the meantime.    Provider location: Home  Patient location: [facility]    Phone call start time: 2:35 PM  Phone call end time: 2:46 PM  Total phone call time: 11 minutes    FABI Thapa, CNP  Text Page  (8am - 5pm, M-F)    Orders this Visit:  Orders Placed This Encounter   Procedures     Follow-Up with Cardiac Structural Heart Clinic     Orders Placed This Encounter   Medications     polyethylene glycol (MIRALAX) 17 g packet     Sig: Take 1 packet by mouth daily     There are no discontinued medications.  Encounter Diagnoses   Name Primary?     Coronary artery disease involving native coronary artery of native heart without angina pectoris Yes     Severe aortic stenosis      Persistent atrial fibrillation (H)      Benign essential hypertension      Mixed hyperlipidemia      CURRENT MEDICATIONS:  Current Outpatient Medications   Medication Sig Dispense Refill     acetaminophen (TYLENOL) 325 MG tablet Take 650 mg by mouth 3 times daily Plus 650 mg bid  prn       apixaban ANTICOAGULANT (ELIQUIS) 5 MG tablet Take 1 tablet (5 mg) by mouth 2 times daily 60 tablet 0     atorvastatin (LIPITOR) 40 MG tablet Take 1 tablet (40 mg) by mouth daily 30 tablet 0     calcium carbonate 600 mg-vitamin D 400 units (CALTRATE) 600-400 MG-UNIT per tablet Take 1 tablet by mouth 2 times daily       clopidogrel (PLAVIX) 75 MG tablet Take 1 tablet (75 mg) by mouth daily 30 tablet 0     Cyanocobalamin 2000 MCG TABS Take 2,000 mcg by mouth every 7 days On Sundays       digoxin (LANOXIN) 125 MCG tablet Take 1 tablet (125 mcg) by mouth daily 30 tablet 0     gabapentin (NEURONTIN) 300 MG capsule Take 2 capsules (600 mg) by mouth every evening 60 capsule 0     metoprolol tartrate (LOPRESSOR) 25 MG tablet Take 0.5 tablets (12.5 mg) by mouth 2 times daily 60 tablet 0     mirtazapine (REMERON) 15 MG tablet Take 1 tablet (15 mg) by mouth At Bedtime 30 tablet 0     multivitamin (OCUVITE) TABS Take 1 tablet by mouth 2 times daily        polyethylene glycol (MIRALAX) 17 g packet Take 1 packet by mouth daily       potassium chloride ER (K-TAB) 20 MEQ CR tablet Take 1 tablet (20 mEq) by mouth daily 30 tablet 0     nitroGLYcerin (NITROSTAT) 0.4 MG sublingual tablet For chest pain place 1 tablet under the tongue every 5 minutes for 3 doses. If symptoms persist 5 minutes after 1st dose call 911. (Patient not taking: Reported on 7/10/2020) 20 tablet 0     ALLERGIES  Allergies   Allergen Reactions     Sulfa Drugs Difficulty breathing and Swelling     Adhesive Tape Blisters     Amiodarone Other (See Comments)     Developed pleural effusion     Penicillins Other (See Comments)     Reaction occurred as a child     PAST MEDICAL, SURGICAL, FAMILY HISTORY:  History was reviewed and updated as needed, see medical record.    SOCIAL HISTORY:  Social History     Socioeconomic History     Marital status:      Spouse name: Not on file     Number of children: Not on file     Years of education: Not on file      Highest education level: Not on file   Occupational History     Not on file   Social Needs     Financial resource strain: Not on file     Food insecurity     Worry: Not on file     Inability: Not on file     Transportation needs     Medical: Not on file     Non-medical: Not on file   Tobacco Use     Smoking status: Former Smoker     Packs/day: 1.00     Years: 30.00     Pack years: 30.00     Types: Cigarettes     Last attempt to quit: 2013     Years since quittin.9     Smokeless tobacco: Never Used   Substance and Sexual Activity     Alcohol use: Yes     Alcohol/week: 42.0 standard drinks     Types: 42 Standard drinks or equivalent per week     Comment: occ     Drug use: No     Sexual activity: Not Currently   Lifestyle     Physical activity     Days per week: Not on file     Minutes per session: Not on file     Stress: Not on file   Relationships     Social connections     Talks on phone: Not on file     Gets together: Not on file     Attends Druze service: Not on file     Active member of club or organization: Not on file     Attends meetings of clubs or organizations: Not on file     Relationship status: Not on file     Intimate partner violence     Fear of current or ex partner: Not on file     Emotionally abused: Not on file     Physically abused: Not on file     Forced sexual activity: Not on file   Other Topics Concern     Parent/sibling w/ CABG, MI or angioplasty before 65F 55M? Not Asked      Service Not Asked     Blood Transfusions Not Asked     Caffeine Concern Yes     Comment: 1 Coke occasionally      Occupational Exposure Not Asked     Hobby Hazards Not Asked     Sleep Concern Not Asked     Stress Concern Not Asked     Weight Concern Not Asked     Special Diet No     Back Care Not Asked     Exercise No     Bike Helmet Not Asked     Seat Belt Not Asked     Self-Exams Not Asked   Social History Narrative    Retired (disability)      Review of Systems:  Skin:  negative  Eyes: positive for negative, glasses  Ears/Nose/Throat: negative  Respiratory: Dyspnea on exertion - sleep apnea uses CPAP, SOB  Cardiovascular:  Negative   Gastrointestinal: diarrhea  Genitourinary: negative   Musculoskeletal: back pain, arthritis, joint pain and muscular weakness  Neurologic: negative  Psychiatric: positive for excessive stress, sleep disturbance, anxiety and depression  Hematologic/Lymphatic/Immunologic: negative  Endocrine: negative    Patient Reported Vitals:   BP: N/A  Pulse: N/A  Weight: N/A    Sugar Garcia CMA    There were no vitals taken for this visit.   Wt Readings from Last 4 Encounters:   07/09/20 64.3 kg (141 lb 12.8 oz)   07/03/20 64.4 kg (142 lb)   06/30/20 65.1 kg (143 lb 9.6 oz)   06/29/20 64.9 kg (143 lb)     Recent Lab Results:  LIPID RESULTS:  Lab Results   Component Value Date    CHOL 118 06/24/2019    HDL 51 06/24/2019    LDL 51 06/24/2019    TRIG 80 06/24/2019    CHOLHDLRATIO 2.7 09/04/2015     LIVER ENZYME RESULTS:  Lab Results   Component Value Date    AST 28 05/29/2020    ALT 15 05/29/2020     CBC RESULTS:  Lab Results   Component Value Date    WBC 10.9 07/07/2020    RBC 3.67 (L) 07/07/2020    HGB 11.5 (L) 07/07/2020    HCT 35.8 (L) 07/07/2020    MCV 98 07/07/2020    MCH 31.3 07/07/2020    MCHC 32.1 07/07/2020    RDW 15.8 (H) 07/07/2020     (H) 07/07/2020     BMP RESULTS:  Lab Results   Component Value Date     07/07/2020    POTASSIUM 3.7 07/07/2020    CHLORIDE 109 07/07/2020    CO2 25 07/07/2020    ANIONGAP 5 07/07/2020    GLC 78 07/07/2020    BUN 11 07/07/2020    CR 0.82 07/07/2020    GFRESTIMATED 85 07/07/2020    GFRESTBLACK >90 07/07/2020    ULISSES 8.7 07/07/2020      A1C RESULTS:  Lab Results   Component Value Date    A1C 5.3 09/04/2015     INR RESULTS:  Lab Results   Component Value Date    INR 1.51 (H) 05/18/2020    INR 1.18 (H) 05/18/2020     This note was completed in part using Dragon voice recognition software. Although reviewed after  completion, some word and grammatical errors may occur.     Thank you for allowing me to participate in the care of your patient.    Sincerely,     Pedro rGimes NP     [facility]

## 2020-07-10 NOTE — PATIENT INSTRUCTIONS
[facility] Discharge Orders    Name: Efrem Ramos  : 1945  Planned Discharge Date: 2020  Discharged to: previous home with wife     MEDICAL FOLLOW UP  Follow up with Dr Paige within 1-2 weeks   Follow up with Cardiology 7/10/2020      ORDER CHANGES:  Mirtazapine was started to help your mood and appetite.   Eliquis was resumed and aspirin was discontinued for afib.   Digoxin dose was decreased.     DISCHARGE MEDICATIONS:  The patient s pharmacy is authorized to dispense a 30-day supply of medications. Refill requests should be directed to the primary provider, Danny Paige  Your medications were ordered at [facility] in [place] [phone]   Current Outpatient Medications   Medication Sig Dispense Refill     apixaban ANTICOAGULANT (ELIQUIS) 5 MG tablet Take 1 tablet (5 mg) by mouth 2 times daily 60 tablet 0     atorvastatin (LIPITOR) 40 MG tablet Take 1 tablet (40 mg) by mouth daily 30 tablet 0     clopidogrel (PLAVIX) 75 MG tablet Take 1 tablet (75 mg) by mouth daily 30 tablet 0     digoxin (LANOXIN) 125 MCG tablet Take 1 tablet (125 mcg) by mouth daily 30 tablet 0     gabapentin (NEURONTIN) 300 MG capsule Take 2 capsules (600 mg) by mouth every evening 60 capsule 0     metoprolol tartrate (LOPRESSOR) 25 MG tablet Take 0.5 tablets (12.5 mg) by mouth 2 times daily 60 tablet 0     mirtazapine (REMERON) 15 MG tablet Take 1 tablet (15 mg) by mouth At Bedtime 30 tablet 0     nitroGLYcerin (NITROSTAT) 0.4 MG sublingual tablet For chest pain place 1 tablet under the tongue every 5 minutes for 3 doses. If symptoms persist 5 minutes after 1st dose call 911. 20 tablet 0     potassium chloride ER (K-TAB) 20 MEQ CR tablet Take 1 tablet (20 mEq) by mouth daily 30 tablet 0     acetaminophen (TYLENOL) 325 MG tablet Take 650 mg by mouth 3 times daily Plus 650 mg bid prn       calcium carbonate 600 mg-vitamin D 400 units (CALTRATE) 600-400 MG-UNIT per tablet Take 1 tablet by mouth 2  times daily       Cyanocobalamin 2000 MCG TABS Take 2,000 mcg by mouth every 7 days On Sundays       multivitamin (OCUVITE) TABS Take 1 tablet by mouth 2 times daily          SERVICES:  You and your wife declined home care services.     ADDITIONAL INSTRUCTIONS:  Continue nutritional supplements.     FABI Fuentes CNP  This document was electronically signed on July 9, 2020

## 2020-07-10 NOTE — PROGRESS NOTES
"  Cardiology Phone Visit Progress Note    Service Date: July 10, 2020    PRIMARY CARDIOLOGIST: Dr. Severo Xiong      REASON FOR VISIT: Hospital follow up    Mr. Efrem Ramos is a 75 year old male who is being evaluated via a billable telephone visit to minimize risk of exposure with the current COVID-19 pandemic.     The patient has been notified of following:     \"This telephone visit will be conducted via a call between you and your provider. We have found that certain health care needs can be provided without the need for a physical exam. This service lets us provide the care you need with a short phone conversation. If a prescription is necessary we can send it directly to your pharmacy. If lab work is needed we can place an order for that and you can then stop by our lab to have the test done at a later time.    Telephone visits are billed at different rates depending on your insurance coverage. During this emergency period, for some insurers they may be billed the same as an in-person visit. Please reach out to your insurance provider with any questions.    If during the course of the call the provider feels a telephone visit is not appropriate, you will not be charged for this service.\"    Patient has given verbal consent for Telephone visit? Yes    What phone number would you like to be contacted at? Call patient at [facility] [phone]    How would you like to obtain your AVS? Mail a copy    I had the pleasure of speaking with Efrem Ramos today over the phone for follow up of his recent hospitalization. He is a very nice 75 year old male with a past medical history significant for obstructive sleep apnea, peripheral vascular disease, hyperlipidemia, prior CVA in 2015, atrial fibrillation, severe aortic stenosis, mild to moderate mitral valve stenosis, and coronary artery disease.  He was recently admitted to [facility] on 5/18/2020.  He underwent an " elective coronary angiogram and RCA intervention as part of an outpatient TAVR work-up. Post procedurally he unfortunately developed persistent bleeding from his left femoral access site and subsequently developed hemorrhagic shock.     Unfortunately, he had quite a prolonged hospital stay of over 2 weeks. He had significant bleeding into the groin, scrotum, and penis. He required pressors temporarily following his coronary angiogram and gradually improved over time and with transfusions of IV iron and 4 units of PRBCs throughout his admission. He was discharged on aspirin and Plavix with a plan to restart Eliquis and stop aspirin once his anemia had improved and bleeding risk had subsided some. He was also discharged on to digoxin 250 mcg daily and metoprolol tartrate 12.5 mg for rate control with atrial fibrillation. He was discharged to the TCU at [place].    Following his discharge, he has gradually build some of his strength back up working with rehab at the TCU. His hemoglobin has continued to gradually improve up to 11.5 earlier this week.  He was restarted on apixaban at 5 mg twice daily. Aspirin was stopped and he has continued on Plavix. Alfredo tells me that the swelling in his groin has improved and is now nearly resolved. He has significant pain in his groin with the swelling from the breathing and this has also now nearly completely resolved. He continues to have mild dyspnea on exertion when he is more active working with rehab. He otherwise denies chest pain, palpitations, orthopnea, PND, or lower extremity edema.     He has had a bit of a tough time with depression and low mood through this extended hospitalization and TCU stay, particularly with the current visitor restrictions due to COVID-19. His appetite is been a bit poor as well but has gradually been improving somewhat.  He is planning to be discharged home from the TCU in the next couple of days and he is looking forward to this.    ASSESSMENT  AND PLAN:  1. Coronary artery disease   - Status post complex PCI with rotational arthrectomy and placement of 4 stents to the proximal to distal RCA on 5/18/2020 complicated by right femoral artery bleeding into the groin and postprocedural hemorrhagic shock.  - Stable without anginal symptoms. Continues on Plavix. He was previously on aspirin but this has subsequently been stopped to allow for reinitiation of apixaban for anticoagulation for stroke prophylaxis in the setting of persistent atrial fibrillation. His hemoglobin has gradually improved and is now stable at 11.5.    2. Severe aortic stenosis  - Aortic valve area of 0.8 cm , mean gradient of 32 mmHg in the setting of low stroke volume index of 23 mL/m  with LVEF 50 to 55%.  - He has been seen by the structural heart clinic with plan for TAVR. Will arrange follow-up with the structural heart clinic in the next couple of weeks to discuss further work-up needed prior to proceeding with TAVR now that he seems to be recovering nicely and more stable following his recent prolonged hospitalization.    3. Mild mitral stenosis (severe MAC, mean gradient of 6.8 mmHg at heart rate 106 bpm)     4. Persistent atrial fibrillation  - Anticoagulated on apixaban 5 mg twice daily and rate controlled on digoxin 125 mcg daily and metoprolol tartrate 12.5 mg twice daily.    5. Hypertension, well-controlled    6. Hyperlipidemia  - Treated on atorvastatin 40 mg daily.    7. Obstructive sleep apnea, treated with CPAP    Thank you for the opportunity to participate in this pleasant patient's care.  As noted above, I will arrange for him to visit with the Structural Heart care team in follow up in the next couple of weeks to discuss any further workup needed prior to proceeding with the planned TAVR and to get this scheduled. We would be happy to see him sooner if needed for any concerns in the meantime.    Provider location: Home  Patient location: [facility]    Phone  call start time: 2:35 PM  Phone call end time: 2:46 PM  Total phone call time: 11 minutes    FABI Thapa, CNP  Text Page  (8am - 5pm, M-F)    Orders this Visit:  Orders Placed This Encounter   Procedures     Follow-Up with Cardiac Structural Heart Clinic     Orders Placed This Encounter   Medications     polyethylene glycol (MIRALAX) 17 g packet     Sig: Take 1 packet by mouth daily     There are no discontinued medications.  Encounter Diagnoses   Name Primary?     Coronary artery disease involving native coronary artery of native heart without angina pectoris Yes     Severe aortic stenosis      Persistent atrial fibrillation (H)      Benign essential hypertension      Mixed hyperlipidemia      CURRENT MEDICATIONS:  Current Outpatient Medications   Medication Sig Dispense Refill     acetaminophen (TYLENOL) 325 MG tablet Take 650 mg by mouth 3 times daily Plus 650 mg bid prn       apixaban ANTICOAGULANT (ELIQUIS) 5 MG tablet Take 1 tablet (5 mg) by mouth 2 times daily 60 tablet 0     atorvastatin (LIPITOR) 40 MG tablet Take 1 tablet (40 mg) by mouth daily 30 tablet 0     calcium carbonate 600 mg-vitamin D 400 units (CALTRATE) 600-400 MG-UNIT per tablet Take 1 tablet by mouth 2 times daily       clopidogrel (PLAVIX) 75 MG tablet Take 1 tablet (75 mg) by mouth daily 30 tablet 0     Cyanocobalamin 2000 MCG TABS Take 2,000 mcg by mouth every 7 days On Sundays       digoxin (LANOXIN) 125 MCG tablet Take 1 tablet (125 mcg) by mouth daily 30 tablet 0     gabapentin (NEURONTIN) 300 MG capsule Take 2 capsules (600 mg) by mouth every evening 60 capsule 0     metoprolol tartrate (LOPRESSOR) 25 MG tablet Take 0.5 tablets (12.5 mg) by mouth 2 times daily 60 tablet 0     mirtazapine (REMERON) 15 MG tablet Take 1 tablet (15 mg) by mouth At Bedtime 30 tablet 0     multivitamin (OCUVITE) TABS Take 1 tablet by mouth 2 times daily        polyethylene glycol (MIRALAX) 17 g packet Take 1 packet by mouth daily       potassium  chloride ER (K-TAB) 20 MEQ CR tablet Take 1 tablet (20 mEq) by mouth daily 30 tablet 0     nitroGLYcerin (NITROSTAT) 0.4 MG sublingual tablet For chest pain place 1 tablet under the tongue every 5 minutes for 3 doses. If symptoms persist 5 minutes after 1st dose call 911. (Patient not taking: Reported on 7/10/2020) 20 tablet 0     ALLERGIES  Allergies   Allergen Reactions     Sulfa Drugs Difficulty breathing and Swelling     Adhesive Tape Blisters     Amiodarone Other (See Comments)     Developed pleural effusion     Penicillins Other (See Comments)     Reaction occurred as a child     PAST MEDICAL, SURGICAL, FAMILY HISTORY:  History was reviewed and updated as needed, see medical record.    SOCIAL HISTORY:  Social History     Socioeconomic History     Marital status:      Spouse name: Not on file     Number of children: Not on file     Years of education: Not on file     Highest education level: Not on file   Occupational History     Not on file   Social Needs     Financial resource strain: Not on file     Food insecurity     Worry: Not on file     Inability: Not on file     Transportation needs     Medical: Not on file     Non-medical: Not on file   Tobacco Use     Smoking status: Former Smoker     Packs/day: 1.00     Years: 30.00     Pack years: 30.00     Types: Cigarettes     Last attempt to quit: 2013     Years since quittin.9     Smokeless tobacco: Never Used   Substance and Sexual Activity     Alcohol use: Yes     Alcohol/week: 42.0 standard drinks     Types: 42 Standard drinks or equivalent per week     Comment: occ     Drug use: No     Sexual activity: Not Currently   Lifestyle     Physical activity     Days per week: Not on file     Minutes per session: Not on file     Stress: Not on file   Relationships     Social connections     Talks on phone: Not on file     Gets together: Not on file     Attends Quaker service: Not on file     Active member of club or organization: Not on file      Attends meetings of clubs or organizations: Not on file     Relationship status: Not on file     Intimate partner violence     Fear of current or ex partner: Not on file     Emotionally abused: Not on file     Physically abused: Not on file     Forced sexual activity: Not on file   Other Topics Concern     Parent/sibling w/ CABG, MI or angioplasty before 65F 55M? Not Asked      Service Not Asked     Blood Transfusions Not Asked     Caffeine Concern Yes     Comment: 1 Coke occasionally      Occupational Exposure Not Asked     Hobby Hazards Not Asked     Sleep Concern Not Asked     Stress Concern Not Asked     Weight Concern Not Asked     Special Diet No     Back Care Not Asked     Exercise No     Bike Helmet Not Asked     Seat Belt Not Asked     Self-Exams Not Asked   Social History Narrative    Retired (disability)      Review of Systems:  Skin: negative  Eyes: positive for negative, glasses  Ears/Nose/Throat: negative  Respiratory: Dyspnea on exertion - sleep apnea uses CPAP, SOB  Cardiovascular:  Negative   Gastrointestinal: diarrhea  Genitourinary: negative   Musculoskeletal: back pain, arthritis, joint pain and muscular weakness  Neurologic: negative  Psychiatric: positive for excessive stress, sleep disturbance, anxiety and depression  Hematologic/Lymphatic/Immunologic: negative  Endocrine: negative    Patient Reported Vitals:   BP: N/A  Pulse: N/A  Weight: N/A    Sugar Garcia CMA    There were no vitals taken for this visit.   Wt Readings from Last 4 Encounters:   07/09/20 64.3 kg (141 lb 12.8 oz)   07/03/20 64.4 kg (142 lb)   06/30/20 65.1 kg (143 lb 9.6 oz)   06/29/20 64.9 kg (143 lb)     Recent Lab Results:  LIPID RESULTS:  Lab Results   Component Value Date    CHOL 118 06/24/2019    HDL 51 06/24/2019    LDL 51 06/24/2019    TRIG 80 06/24/2019    CHOLHDLRATIO 2.7 09/04/2015     LIVER ENZYME RESULTS:  Lab Results   Component Value Date    AST 28 05/29/2020    ALT 15 05/29/2020      CBC RESULTS:  Lab Results   Component Value Date    WBC 10.9 07/07/2020    RBC 3.67 (L) 07/07/2020    HGB 11.5 (L) 07/07/2020    HCT 35.8 (L) 07/07/2020    MCV 98 07/07/2020    MCH 31.3 07/07/2020    MCHC 32.1 07/07/2020    RDW 15.8 (H) 07/07/2020     (H) 07/07/2020     BMP RESULTS:  Lab Results   Component Value Date     07/07/2020    POTASSIUM 3.7 07/07/2020    CHLORIDE 109 07/07/2020    CO2 25 07/07/2020    ANIONGAP 5 07/07/2020    GLC 78 07/07/2020    BUN 11 07/07/2020    CR 0.82 07/07/2020    GFRESTIMATED 85 07/07/2020    GFRESTBLACK >90 07/07/2020    ULISSES 8.7 07/07/2020      A1C RESULTS:  Lab Results   Component Value Date    A1C 5.3 09/04/2015     INR RESULTS:  Lab Results   Component Value Date    INR 1.51 (H) 05/18/2020    INR 1.18 (H) 05/18/2020     This note was completed in part using Dragon voice recognition software. Although reviewed after completion, some word and grammatical errors may occur.

## 2020-07-10 NOTE — PATIENT INSTRUCTIONS
Thank you for your phone visit with the [facility] today.    Today's plan:   1. Continue with your current medications.  2. I will have scheduling reach out to you to arrange a follow-up visit with the structural heart clinic in the next couple of weeks to discuss the next steps for the planned TAVR (aortic valve replacement).    If you have questions or concerns in the meantime, please do not hesitate to call my nurse at [phone].    Scheduling phone number: [phone]    It was a pleasure speaking with you today!     Seth Grimes, Nurse Practitioner  [facility]  July 10, 2020  _____________________________________________________

## 2020-07-20 ENCOUNTER — PATIENT OUTREACH (OUTPATIENT)
Dept: CARE COORDINATION | Facility: CLINIC | Age: 75
End: 2020-07-20

## 2020-07-20 DIAGNOSIS — S30.1XXA GROIN HEMATOMA: Primary | ICD-10-CM

## 2020-07-20 DIAGNOSIS — R57.8 HEMORRHAGIC SHOCK (H): ICD-10-CM

## 2020-07-20 NOTE — PROGRESS NOTES
Clinic Care Coordination Contact  [facility]/Voicemail       Clinical Data: Care Coordinator Outreach  [facility]     Discharge Summary  Hospitalist     Date of Admission:  5/18/2020  Date of Discharge:  6/4/2020  Discharging Provider: Ramesh Marie MD  Date of Service (when I saw the patient): 06/04/20        Discharge Diagnoses        Postprocedure massive hematoma, left groin/scrotum  Hemorrhagic shock, resolved  Acute blood loss anemia  Chronic atrial fibrillation with RVR  CAD s/p PCIx4 to RCA (5/18/2020)  Hypertension  Hyperlipidemia  Severe aortic stenosis  Sepsis (Leukocytosis, lactic acidosis with hypotension, tachycardia)  Bilateral pulmonary infiltrates concerning for pna vs CHF  MEL     Outreach attempted x 1.  Left message on patient's voicemail with call back information and requested return call.  Plan: . Care Coordinator will try to reach patient again in 1-2 business days.  [facility]     Shannen Lynn RN Care Coordinator   [facility] / [facility] -[facility]   Phone: [phone]  Email :  [email or web address]

## 2020-07-22 NOTE — PROGRESS NOTES
Clinic Care Coordination Contact  [facility]/Voicemail    Referral Source: IP Report  [facility]     Discharge Summary  Hospitalist     Date of Admission:  5/18/2020  Date of Discharge:  6/4/2020  Discharging Provider: Ramesh Marie MD  Date of Service (when I saw the patient): 06/04/20        Discharge Diagnoses        Postprocedure massive hematoma, left groin/scrotum  Hemorrhagic shock, resolved  Acute blood loss anemia  Chronic atrial fibrillation with RVR  CAD s/p PCIx4 to RCA (5/18/2020)  Hypertension  Hyperlipidemia  Severe aortic stenosis  Sepsis (Leukocytosis, lactic acidosis with hypotension, tachycardia)  Bilateral pulmonary infiltrates concerning for pna vs CHF  MEL       TCU discharge July 11 ,2020      Clinical Data: Care Coordinator Outreach  Outreach attempted x 2.  Left message on patient's voicemail with call back information and requested return call.  Left a message to make a follow up appointment with Dr Paige  Plan: . Care Coordinator will do no further outreaches at this time.  [facility]     Shannen Lynn RN Care Coordinator   [facility] / [facility] [place]   Phone: [phone]  Email :  [email or web address]

## 2020-08-07 DIAGNOSIS — I25.10 CORONARY ARTERY DISEASE INVOLVING NATIVE CORONARY ARTERY OF NATIVE HEART WITHOUT ANGINA PECTORIS: ICD-10-CM

## 2020-08-07 DIAGNOSIS — I48.20 CHRONIC A-FIB (H): ICD-10-CM

## 2020-08-10 RX ORDER — ATORVASTATIN CALCIUM 40 MG/1
TABLET, FILM COATED ORAL
Qty: 90 TABLET | Refills: 0 | Status: SHIPPED | OUTPATIENT
Start: 2020-08-10 | End: 2021-02-10

## 2020-08-10 RX ORDER — CLOPIDOGREL BISULFATE 75 MG/1
TABLET ORAL
Qty: 90 TABLET | Refills: 0 | Status: SHIPPED | OUTPATIENT
Start: 2020-08-10 | End: 2020-11-12

## 2020-08-10 RX ORDER — DIGOXIN 125 MCG
TABLET ORAL
Qty: 90 TABLET | Refills: 0 | Status: SHIPPED | OUTPATIENT
Start: 2020-08-10 | End: 2020-11-12

## 2020-08-28 ENCOUNTER — TELEPHONE (OUTPATIENT)
Dept: NEUROSURGERY | Facility: CLINIC | Age: 75
End: 2020-08-28

## 2020-08-28 DIAGNOSIS — M80.08XA AGE-RELATED OSTEOPOROSIS WITH CURRENT PATHOLOGICAL FRACTURE, VERTEBRA(E), INITIAL ENCOUNTER FOR FRACTURE (H): Primary | ICD-10-CM

## 2020-08-28 DIAGNOSIS — S22.080A T12 COMPRESSION FRACTURE (H): ICD-10-CM

## 2020-08-28 NOTE — TELEPHONE ENCOUNTER
Patient's wife Called to get patient scheduled for his IR Thoracic Vertebroplasty. Provided them the contact number to get that scheduled and they were advised they needed an  MRA done.     Please place order for MRA and contact wife at  [phone] once order is placed.

## 2020-08-28 NOTE — TELEPHONE ENCOUNTER
Spoke to Mariajose, wife, and she mentioned he will be having this procedure done at [facility]. Informed her writer will reach out to Interventional Radiology (IR) to get more information as to what orders are needed. Writer left a voice message for IR to call writer back to confirm what is being requested.

## 2020-08-31 DIAGNOSIS — F32.A DEPRESSION, UNSPECIFIED DEPRESSION TYPE: ICD-10-CM

## 2020-08-31 NOTE — TELEPHONE ENCOUNTER
Spoke to DARVIN Andrade Care Coordinator, she states patient needs an updated MRI within 30 days of procedure date.     Attempted to reach out to patient/spouse, no answer. Left voice message asking them to call clinic back to further discuss. Should ask if he has the procedure scheduled or not. Writer will forward this to WILL Mcneil, to confirm that it is OK to order the MRI.

## 2020-08-31 NOTE — TELEPHONE ENCOUNTER
Per WILL Mcneil, OK to order MRI. MRI ordered. Still awaiting for a call back from patient/spouse.

## 2020-09-01 RX ORDER — MIRTAZAPINE 15 MG/1
TABLET, FILM COATED ORAL
Qty: 90 TABLET | Refills: 0 | Status: SHIPPED | OUTPATIENT
Start: 2020-09-01 | End: 2020-12-09

## 2020-09-01 NOTE — TELEPHONE ENCOUNTER
Pt last visit in clinic with Diego Jan 2019, virtual visit 4/2/20 with [facility] uriah has reached out to pt in July.  But was being followed closely by geriatrics  As was in tcu      Note to pharmacy to have pt call to schedule f/u with diego.  Gretta Rich RN

## 2020-09-03 NOTE — TELEPHONE ENCOUNTER
Called and spoke to Mariajose, patients wife. She states that they have the MRI scheduled for 9/13. No further questions or concerns at this time.

## 2020-09-13 ENCOUNTER — HOSPITAL ENCOUNTER (OUTPATIENT)
Dept: MRI IMAGING | Facility: CLINIC | Age: 75
Discharge: HOME | End: 2020-09-13
Attending: PHYSICIAN ASSISTANT | Admitting: PHYSICIAN ASSISTANT
Payer: MEDICARE

## 2020-09-13 DIAGNOSIS — S22.080A T12 COMPRESSION FRACTURE (H): ICD-10-CM

## 2020-09-13 DIAGNOSIS — M80.08XA AGE-RELATED OSTEOPOROSIS WITH CURRENT PATHOLOGICAL FRACTURE, VERTEBRA(E), INITIAL ENCOUNTER FOR FRACTURE (H): ICD-10-CM

## 2020-09-13 PROCEDURE — 72148 MRI LUMBAR SPINE W/O DYE: CPT

## 2020-09-23 ENCOUNTER — TELEPHONE (OUTPATIENT)
Dept: UROLOGY | Facility: CLINIC | Age: 75
End: 2020-09-23

## 2020-10-08 DIAGNOSIS — R33.9 URINARY RETENTION: Primary | ICD-10-CM

## 2020-11-12 DIAGNOSIS — I25.10 CORONARY ARTERY DISEASE INVOLVING NATIVE CORONARY ARTERY OF NATIVE HEART WITHOUT ANGINA PECTORIS: ICD-10-CM

## 2020-11-12 DIAGNOSIS — I35.0 AORTIC STENOSIS: Primary | ICD-10-CM

## 2020-11-12 DIAGNOSIS — I48.20 CHRONIC A-FIB (H): ICD-10-CM

## 2020-11-12 RX ORDER — CLOPIDOGREL BISULFATE 75 MG/1
75 TABLET ORAL DAILY
Qty: 90 TABLET | Refills: 3 | Status: SHIPPED | OUTPATIENT
Start: 2020-11-12 | End: 2021-11-16

## 2020-11-12 RX ORDER — DIGOXIN 125 MCG
125 TABLET ORAL DAILY
Qty: 90 TABLET | Refills: 3 | Status: SHIPPED | OUTPATIENT
Start: 2020-11-12 | End: 2021-11-10

## 2020-11-12 NOTE — PROGRESS NOTES
Received call from Mariajose, patient's wife requesting refills for his medications.  He had a virtural visit with Seth Grimes in July 2020.  At that time he was told to:  1. stop ASA  2. Continue Plavix  3. Restart Eliquis.  According to his wife patient is taking all 3 meds.  I informed her that he should stop his ASA. Continue Plavix and Eliquis.  Will refill.  Wife also requesting refill for Isosorbid.  She states patient takes this medication on prn basis for chest discomfort.  He has not needed many.  I informed her he should take subl NTG, which he has for chest discomfort and not Isosorbide.  Will place orders for echo and to be seen by Dr. Cortes sometime Feb. 2021.

## 2020-12-07 DIAGNOSIS — F32.A DEPRESSION, UNSPECIFIED DEPRESSION TYPE: ICD-10-CM

## 2020-12-09 RX ORDER — MIRTAZAPINE 15 MG/1
TABLET, FILM COATED ORAL
Qty: 90 TABLET | Refills: 0 | Status: SHIPPED | OUTPATIENT
Start: 2020-12-09 | End: 2021-03-11

## 2020-12-09 NOTE — TELEPHONE ENCOUNTER
Prescription approved per [facility].  Hermelinda GRAHAM RN

## 2021-01-01 ENCOUNTER — TELEPHONE (OUTPATIENT)
Dept: CARDIOLOGY | Facility: CLINIC | Age: 76
End: 2021-01-01
Payer: MEDICARE

## 2021-01-01 ENCOUNTER — HOSPITAL ENCOUNTER (OUTPATIENT)
Dept: CARDIOLOGY | Facility: CLINIC | Age: 76
Discharge: HOME | End: 2021-12-06
Attending: INTERNAL MEDICINE | Admitting: INTERNAL MEDICINE
Payer: MEDICARE

## 2021-01-01 DIAGNOSIS — I48.19 PERSISTENT ATRIAL FIBRILLATION (H): ICD-10-CM

## 2021-01-01 DIAGNOSIS — I48.20 CHRONIC A-FIB (H): ICD-10-CM

## 2021-01-01 DIAGNOSIS — I50.32 CHRONIC DIASTOLIC CONGESTIVE HEART FAILURE (H): ICD-10-CM

## 2021-01-01 DIAGNOSIS — I25.10 CORONARY ARTERY DISEASE INVOLVING NATIVE CORONARY ARTERY OF NATIVE HEART WITHOUT ANGINA PECTORIS: ICD-10-CM

## 2021-01-01 DIAGNOSIS — I35.0 AORTIC VALVE STENOSIS, ETIOLOGY OF CARDIAC VALVE DISEASE UNSPECIFIED: ICD-10-CM

## 2021-01-01 DIAGNOSIS — G25.81 RESTLESS LEGS SYNDROME (RLS): ICD-10-CM

## 2021-01-01 LAB — LVEF ECHO: NORMAL

## 2021-01-01 PROCEDURE — 93306 TTE W/DOPPLER COMPLETE: CPT

## 2021-01-01 PROCEDURE — 93306 TTE W/DOPPLER COMPLETE: CPT | Mod: 26 | Performed by: INTERNAL MEDICINE

## 2021-01-01 RX ORDER — GABAPENTIN 300 MG/1
CAPSULE ORAL
Qty: 180 CAPSULE | Refills: 3 | Status: SHIPPED | OUTPATIENT
Start: 2021-01-01 | End: 2022-01-01

## 2021-01-14 ENCOUNTER — HEALTH MAINTENANCE LETTER (OUTPATIENT)
Age: 76
End: 2021-01-14

## 2021-02-10 DIAGNOSIS — I25.10 CORONARY ARTERY DISEASE INVOLVING NATIVE CORONARY ARTERY OF NATIVE HEART WITHOUT ANGINA PECTORIS: ICD-10-CM

## 2021-02-10 RX ORDER — ATORVASTATIN CALCIUM 40 MG/1
40 TABLET, FILM COATED ORAL DAILY
Qty: 90 TABLET | Refills: 0 | Status: SHIPPED | OUTPATIENT
Start: 2021-02-10 | End: 2021-05-10

## 2021-02-16 ENCOUNTER — HOSPITAL ENCOUNTER (OUTPATIENT)
Dept: LAB | Facility: CLINIC | Age: 76
End: 2021-02-16
Attending: INTERNAL MEDICINE
Payer: MEDICARE

## 2021-02-16 ENCOUNTER — HOSPITAL ENCOUNTER (OUTPATIENT)
Dept: GENERAL RADIOLOGY | Facility: CLINIC | Age: 76
End: 2021-02-16
Attending: INTERNAL MEDICINE
Payer: MEDICARE

## 2021-02-16 DIAGNOSIS — E53.8 VITAMIN B12 DEFICIENCY (NON ANEMIC): ICD-10-CM

## 2021-02-16 DIAGNOSIS — D47.2 MGUS (MONOCLONAL GAMMOPATHY OF UNKNOWN SIGNIFICANCE): ICD-10-CM

## 2021-02-16 LAB
ALBUMIN SERPL-MCNC: 3.2 G/DL (ref 3.4–5)
ALP SERPL-CCNC: 115 U/L (ref 40–150)
ALT SERPL W P-5'-P-CCNC: 52 U/L (ref 0–70)
ANION GAP SERPL CALCULATED.3IONS-SCNC: 4 MMOL/L (ref 3–14)
AST SERPL W P-5'-P-CCNC: 37 U/L (ref 0–45)
BASOPHILS # BLD AUTO: 0.1 10E9/L (ref 0–0.2)
BASOPHILS NFR BLD AUTO: 1.1 %
BILIRUB SERPL-MCNC: 0.6 MG/DL (ref 0.2–1.3)
BUN SERPL-MCNC: 14 MG/DL (ref 7–30)
CALCIUM SERPL-MCNC: 8.9 MG/DL (ref 8.5–10.1)
CHLORIDE SERPL-SCNC: 108 MMOL/L (ref 94–109)
CO2 SERPL-SCNC: 26 MMOL/L (ref 20–32)
CREAT SERPL-MCNC: 0.92 MG/DL (ref 0.66–1.25)
DIFFERENTIAL METHOD BLD: ABNORMAL
EOSINOPHIL # BLD AUTO: 0.3 10E9/L (ref 0–0.7)
EOSINOPHIL NFR BLD AUTO: 3.3 %
ERYTHROCYTE [DISTWIDTH] IN BLOOD BY AUTOMATED COUNT: 15.5 % (ref 10–15)
GFR SERPL CREATININE-BSD FRML MDRD: 80 ML/MIN/{1.73_M2}
GLUCOSE SERPL-MCNC: 92 MG/DL (ref 70–99)
HCT VFR BLD AUTO: 37.5 % (ref 40–53)
HGB BLD-MCNC: 11.8 G/DL (ref 13.3–17.7)
IMM GRANULOCYTES # BLD: 0.1 10E9/L (ref 0–0.4)
IMM GRANULOCYTES NFR BLD: 0.7 %
LYMPHOCYTES # BLD AUTO: 1.6 10E9/L (ref 0.8–5.3)
LYMPHOCYTES NFR BLD AUTO: 15.7 %
MCH RBC QN AUTO: 30.8 PG (ref 26.5–33)
MCHC RBC AUTO-ENTMCNC: 31.5 G/DL (ref 31.5–36.5)
MCV RBC AUTO: 98 FL (ref 78–100)
MONOCYTES # BLD AUTO: 0.8 10E9/L (ref 0–1.3)
MONOCYTES NFR BLD AUTO: 7.3 %
NEUTROPHILS # BLD AUTO: 7.5 10E9/L (ref 1.6–8.3)
NEUTROPHILS NFR BLD AUTO: 71.9 %
NRBC # BLD AUTO: 0 10*3/UL
NRBC BLD AUTO-RTO: 0 /100
PLATELET # BLD AUTO: 344 10E9/L (ref 150–450)
POTASSIUM SERPL-SCNC: 3.8 MMOL/L (ref 3.4–5.3)
PROT SERPL-MCNC: 7.8 G/DL (ref 6.8–8.8)
RBC # BLD AUTO: 3.83 10E12/L (ref 4.4–5.9)
SODIUM SERPL-SCNC: 138 MMOL/L (ref 133–144)
VIT B12 SERPL-MCNC: 767 PG/ML (ref 193–986)
WBC # BLD AUTO: 10.4 10E9/L (ref 4–11)

## 2021-02-16 PROCEDURE — 36415 COLL VENOUS BLD VENIPUNCTURE: CPT | Performed by: INTERNAL MEDICINE

## 2021-02-16 PROCEDURE — 85025 COMPLETE CBC W/AUTO DIFF WBC: CPT | Performed by: INTERNAL MEDICINE

## 2021-02-16 PROCEDURE — 86334 IMMUNOFIX E-PHORESIS SERUM: CPT | Mod: 26 | Performed by: PATHOLOGY

## 2021-02-16 PROCEDURE — 77075 RADEX OSSEOUS SURVEY COMPL: CPT

## 2021-02-16 PROCEDURE — 82784 ASSAY IGA/IGD/IGG/IGM EACH: CPT | Performed by: INTERNAL MEDICINE

## 2021-02-16 PROCEDURE — 84165 PROTEIN E-PHORESIS SERUM: CPT | Mod: TC | Performed by: INTERNAL MEDICINE

## 2021-02-16 PROCEDURE — 999N001036 HC STATISTIC TOTAL PROTEIN: Performed by: INTERNAL MEDICINE

## 2021-02-16 PROCEDURE — 82607 VITAMIN B-12: CPT | Performed by: INTERNAL MEDICINE

## 2021-02-16 PROCEDURE — 80053 COMPREHEN METABOLIC PANEL: CPT | Performed by: INTERNAL MEDICINE

## 2021-02-16 PROCEDURE — 84165 PROTEIN E-PHORESIS SERUM: CPT | Mod: 26 | Performed by: PATHOLOGY

## 2021-02-16 PROCEDURE — 86334 IMMUNOFIX E-PHORESIS SERUM: CPT | Mod: TC | Performed by: INTERNAL MEDICINE

## 2021-02-18 LAB
ALBUMIN SERPL ELPH-MCNC: 3.4 G/DL (ref 3.7–5.1)
ALPHA1 GLOB SERPL ELPH-MCNC: 0.4 G/DL (ref 0.2–0.4)
ALPHA2 GLOB SERPL ELPH-MCNC: 0.9 G/DL (ref 0.5–0.9)
B-GLOBULIN SERPL ELPH-MCNC: 0.7 G/DL (ref 0.6–1)
GAMMA GLOB SERPL ELPH-MCNC: 2 G/DL (ref 0.7–1.6)
IGA SERPL-MCNC: 425 MG/DL (ref 84–499)
IGG SERPL-MCNC: 1936 MG/DL (ref 610–1616)
IGM SERPL-MCNC: 164 MG/DL (ref 35–242)
M PROTEIN SERPL ELPH-MCNC: 0.6 G/DL
PROT PATTERN SERPL ELPH-IMP: ABNORMAL
PROT PATTERN SERPL IFE-IMP: ABNORMAL

## 2021-02-19 NOTE — PROGRESS NOTES
"[facility]    Hematology/Oncology Established Patient Follow-up Note      Today's Date: 02/24/20    Reason for Follow-up: MGUS, IgG kappa type.     Efrem Ramos is a 75 year old male who is being evaluated via a billable telephone visit.      The patient has been notified of following:     \"This telephone visit will be conducted via a call between you and your physician/provider. We have found that certain health care needs can be provided without the need for a physical exam.  This service lets us provide the care you need with a short phone conversation during the COVID-19 pandemic.  If a prescription is necessary we can send it directly to your pharmacy.  If lab work is needed we can place an order for that and you can then stop by our lab to have the test done at a later time.    Telephone visits are billed at different rates depending on your insurance coverage. During this emergency period, for some insurers they may be billed the same as an in-person visit.  Please reach out to your insurance provider with any questions.    If during the course of the call the physician/provider feels a telephone visit is not appropriate, you will not be charged for this service.\"    Patient has given verbal consent for Telephone visit?  Yes    How would you like to obtain your AVS? [employer]    Phone visit duration: 10 minutes    HISTORY OF PRESENT ILLNESS: Efrem Ramos is a 75 year old male who presents for follow-up of MGUS and vitamin B12 deficiency anemia.  He was previously followed by Dr. Alan Solomon for MGUS in 2013 when his M-spike was 0.6 g/dL.  He then transferred his care to Dr. Shae Aldana around 2015.  He underwent bone marrow biopsy on 6/08/2017 which showed normocellular marrow with 0.3% monoclonal plasma cells; 1 of 20 metaphase cells had a hyperdiploid karyotype with gains of one extra copy of chromosomes 5, 7, 9, 15, and loss of 1 copy each of chromosomes 13 and 22; FISH showed " loss of chromosome 13.  For his vitamin B12 deficiency anemia, he received oral B12 2000 mcg once per week.      INTERIM HISTORY:  Alfredo reports stable fatigue.  He has chronic right lower extremity weakness that he is attributed to prior blood clot.  Otherwise, he denies any fevers, chills, night sweats, unintentional weight loss, bowel or bladder dysfunction.      He was recently hospitalized for cardiac issues and is still recovering from it.      REVIEW OF SYSTEMS:   14 point ROS was reviewed and is negative other than as noted above in HPI.       HOME MEDICATIONS:  Current Outpatient Medications   Medication Sig Dispense Refill     acetaminophen (TYLENOL) 325 MG tablet Take 650 mg by mouth 3 times daily Plus 650 mg bid prn       apixaban ANTICOAGULANT (ELIQUIS) 5 MG tablet Take 1 tablet (5 mg) by mouth 2 times daily 180 tablet 3     atorvastatin (LIPITOR) 40 MG tablet Take 1 tablet (40 mg) by mouth daily 90 tablet 0     calcium carbonate 600 mg-vitamin D 400 units (CALTRATE) 600-400 MG-UNIT per tablet Take 1 tablet by mouth 2 times daily       clopidogrel (PLAVIX) 75 MG tablet Take 1 tablet (75 mg) by mouth daily 90 tablet 3     Cyanocobalamin 2000 MCG TABS Take 2,000 mcg by mouth every 7 days On Sundays       digoxin (LANOXIN) 125 MCG tablet Take 1 tablet (125 mcg) by mouth daily 90 tablet 3     gabapentin (NEURONTIN) 300 MG capsule TAKE 2 CAPSULES(600 MG) BY MOUTH EVERY EVENING 180 capsule 3     metoprolol tartrate (LOPRESSOR) 25 MG tablet Take 0.5 tablets (12.5 mg) by mouth 2 times daily 60 tablet 0     mirtazapine (REMERON) 15 MG tablet TAKE 1 TABLET(15 MG) BY MOUTH AT BEDTIME 90 tablet 0     multivitamin (OCUVITE) TABS Take 1 tablet by mouth 2 times daily        nitroGLYcerin (NITROSTAT) 0.4 MG sublingual tablet For chest pain place 1 tablet under the tongue every 5 minutes for 3 doses. If symptoms persist 5 minutes after 1st dose call 911. (Patient not taking: Reported on 7/10/2020) 20 tablet 0      polyethylene glycol (MIRALAX) 17 g packet Take 1 packet by mouth daily       potassium chloride ER (K-TAB) 20 MEQ CR tablet Take 1 tablet (20 mEq) by mouth daily 30 tablet 0         ALLERGIES:  Allergies   Allergen Reactions     Sulfa Drugs Difficulty breathing and Swelling     Adhesive Tape Blisters     Amiodarone Other (See Comments)     Developed pleural effusion     Penicillins Other (See Comments)     Reaction occurred as a child         PAST MEDICAL HISTORY:  Past Medical History:   Diagnosis Date     Atrial fibrillation (H)     amiodarone therapy discontinued due to pulmonary toxicity     Atrial flutter (H)      Benign essential hypertension 11/20/2018     Cancer (H) vocal cord     Carpal tunnel syndrome     abstracted 7/3/02.     Coronary artery disease involving native coronary artery of native heart without angina pectoris 5/8/2020    Cath 5/28/2020: patent stents; Cath 5/18/2020: ISABEL x4 to RCA; Cath 3/2020: 1 vessel disease; Cath 2017: moderate 2 vessel disease     CVA (cerebral infarction) 5/5/2015     Demyelinating disease of central nervous system, unspecified (H)     abstracted 7/3/02.     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     Pneumonia 8/17/2016     PVD (peripheral vascular disease) (H)      Redundant colon     needs CT colonography     Shingles      SKIN DISORDERS NEC 3/15/2005     Sleep apnea      SVT  (supraventricular tachycardia) (H)          PAST SURGICAL HISTORY:  Past Surgical History:   Procedure Laterality Date     BIOPSY  brain 2002     BONE MARROW BIOPSY, BONE SPECIMEN, NEEDLE/TROCAR N/A 6/8/2017    Procedure: BIOPSY BONE MARROW;  UNILATERAL BONE MARROW BIOPSY (CONSCIOUS SEDATION) ;  Surgeon: Jamie Gonzales MD;  Location:  GI     CARDIAC CATHERIZATION  09/05/2017    2V CAD, IFR of RCA 0.95     CV CORONARY ANGIOGRAM N/A 3/23/2020    Procedure: Coronary Angiogram and right heart cath;  Surgeon: Gino Ferrer MD;  Location:  HEART CARDIAC CATH LAB     CV HEART CATHETERIZATION WITH POSSIBLE INTERVENTION N/A 5/28/2020    Procedure: Heart Catheterization with Possible Intervention;  Surgeon: Larry Chawla MD;  Location:  HEART CARDIAC CATH LAB     CV HEART CATHETERIZATION WITH POSSIBLE INTERVENTION N/A 5/18/2020    Procedure: Heart Catheterization with Possible Intervention;  Surgeon: Gaurang Swenson MD;  Location:  HEART CARDIAC CATH LAB     CV INSTANTANEOUS WAVE-FREE RATIO N/A 3/23/2020    Procedure: Instantaneous Wave-Free Ratio;  Surgeon: Gino Ferrer MD;  Location:  HEART CARDIAC CATH LAB     CV PCI ATHERECTOMY ORBITAL N/A 5/18/2020    Procedure: Percutaneous Coronary Intervention Atherectomy Rotational;  Surgeon: Gaurang Swenson MD;  Location:  HEART CARDIAC CATH LAB     CV PCI STENT DRUG ELUTING N/A 5/18/2020    Procedure: Percutaneous Coronary Intervention Stent Drug Eluting;  Surgeon: Gaurang Swenson MD;  Location:  HEART CARDIAC CATH LAB     CV RIGHT HEART CATH MEASUREMENTS RECORDED N/A 5/28/2020    Procedure: Right Heart Cath;  Surgeon: Larry Chawla MD;  Location: [facility] CARDIAC CATH LAB     CV TEMPORARY PACEMAKER INSERTION N/A 5/18/2020    Procedure: Temporary Pacemaker Insertion;  Surgeon: Gaurang Swenson MD;  Location:  HEART CARDIAC CATH LAB     ESOPHAGOSCOPY, GASTROSCOPY, DUODENOSCOPY (EGD), COMBINED N/A 9/8/2018     Procedure: COMBINED ESOPHAGOSCOPY, GASTROSCOPY, DUODENOSCOPY (EGD), BIOPSY SINGLE OR MULTIPLE;;  Surgeon: Xander Marie MD;  Location:  GI     HEAD & NECK SURGERY       ORTHOPEDIC SURGERY      right arm ulna reset after injury     THORACOSCOPIC WEDGE RESECTION LUNG Right 2016    Procedure: THORACOSCOPIC WEDGE RESECTION LUNG;  Surgeon: Abdelrahman Noriega MD;  Location:  OR     [facility] NONSPECIFIC PROCEDURE  as a child    T & A. abstracted 7/3/02.     ZZC NONSPECIFIC PROCEDURE  early    CTR. abstracted 7/3/02.         SOCIAL HISTORY:  Social History     Socioeconomic History     Marital status:      Spouse name: Not on file     Number of children: Not on file     Years of education: Not on file     Highest education level: Not on file   Occupational History     Not on file   Social Needs     Financial resource strain: Not on file     Food insecurity     Worry: Not on file     Inability: Not on file     Transportation needs     Medical: Not on file     Non-medical: Not on file   Tobacco Use     Smoking status: Former Smoker     Packs/day: 1.00     Years: 30.00     Pack years: 30.00     Types: Cigarettes     Quit date: 2013     Years since quittin.5     Smokeless tobacco: Never Used   Substance and Sexual Activity     Alcohol use: Yes     Alcohol/week: 42.0 standard drinks     Types: 42 Standard drinks or equivalent per week     Comment: occ     Drug use: No     Sexual activity: Not Currently   Lifestyle     Physical activity     Days per week: Not on file     Minutes per session: Not on file     Stress: Not on file   Relationships     Social connections     Talks on phone: Not on file     Gets together: Not on file     Attends Restoration service: Not on file     Active member of club or organization: Not on file     Attends meetings of clubs or organizations: Not on file     Relationship status: Not on file     Intimate partner violence     Fear of current or ex partner: Not on  file     Emotionally abused: Not on file     Physically abused: Not on file     Forced sexual activity: Not on file   Other Topics Concern     Parent/sibling w/ CABG, MI or angioplasty before 65F 55M? Not Asked      Service Not Asked     Blood Transfusions Not Asked     Caffeine Concern Yes     Comment: 1 Coke occasionally      Occupational Exposure Not Asked     Hobby Hazards Not Asked     Sleep Concern Not Asked     Stress Concern Not Asked     Weight Concern Not Asked     Special Diet No     Back Care Not Asked     Exercise No     Bike Helmet Not Asked     Seat Belt Not Asked     Self-Exams Not Asked   Social History Narrative    Retired (disability)          FAMILY HISTORY:  Family History   Problem Relation Age of Onset     Blood Disease Mother         Anemia     Cardiovascular Father      Cancer - colorectal Maternal Grandfather      Hypertension Brother      Diabetes Sister 5        Juvinile Diabetes passed at 36     Respiratory Other         Lung Cancer         PHYSICAL EXAM:  Vital signs:  There were no vitals taken for this visit.   Not performed as this was a telephone visit.    LABS:  CBC RESULTS:   Recent Labs   Lab Test 02/16/21  1255   WBC 10.4   RBC 3.83*   HGB 11.8*   HCT 37.5*   MCV 98   MCH 30.8   MCHC 31.5   RDW 15.5*        Last Comprehensive Metabolic Panel:  Sodium   Date Value Ref Range Status   02/16/2021 138 133 - 144 mmol/L Final     Potassium   Date Value Ref Range Status   02/16/2021 3.8 3.4 - 5.3 mmol/L Final     Chloride   Date Value Ref Range Status   02/16/2021 108 94 - 109 mmol/L Final     Carbon Dioxide   Date Value Ref Range Status   02/16/2021 26 20 - 32 mmol/L Final     Anion Gap   Date Value Ref Range Status   02/16/2021 4 3 - 14 mmol/L Final     Glucose   Date Value Ref Range Status   02/16/2021 92 70 - 99 mg/dL Final     Urea Nitrogen   Date Value Ref Range Status   02/16/2021 14 7 - 30 mg/dL Final     Creatinine   Date Value Ref Range Status    2021 0.92 0.66 - 1.25 mg/dL Final     GFR Estimate   Date Value Ref Range Status   2021 80 >60 mL/min/[1.73_m2] Final     Comment:     Non  GFR Calc  Starting 2018, serum creatinine based estimated GFR (eGFR) will be   calculated using the Chronic Kidney Disease Epidemiology Collaboration   (CKD-EPI) equation.       Calcium   Date Value Ref Range Status   2021 8.9 8.5 - 10.1 mg/dL Final     Bilirubin Total   Date Value Ref Range Status   2021 0.6 0.2 - 1.3 mg/dL Final     Alkaline Phosphatase   Date Value Ref Range Status   2021 115 40 - 150 U/L Final     ALT   Date Value Ref Range Status   2021 52 0 - 70 U/L Final     AST   Date Value Ref Range Status   2021 37 0 - 45 U/L Final     Vitamin B12 = 767    SPEP - M-spike:  2017: 0.4  2017: 0.4  3/21/2018: 0.4  2018: 0.5  2018: 0.5  2019: 0.7  2020: 0.8  2021: 0.6    2021:  IgA = 425  IgG = 1936  IgM = 164    PATHOLOGY:  2017 Bone marrow biopsy:   1.  Peripheral blood with mild normochromic normocytic anemia.   2.  Normocellular bone marrow with a 0.5% plasma cells with monoclonal   light chain restriction identified in 0.3% plasma cells  (MGUS).   Flow cytometry, cytogenetics, and FISH panel studies sent and pending   and be separately reported.   4.  Adequate bone marrow storage iron    IMAGIN/10/2020 CT chest without contrast:  1.  Pulmonary fibrosis, without significant change from the prior  study. CT features are most consistent with a non-IPF diagnosis.  Findings are nonspecific, though differential includes drug toxicity,  combined pulmonary fibrosis and emphysema, postinfectious change, and  other processes.  2.  Diffuse bronchial wall thickening and debris in the areas can be  seen with bronchitis.  3.  Bronchiectasis.  4.  Severe coronary artery disease.  5.  Emphysema.    2021: Bone survey showed calvarial lucency unchanged from 2018; T11,  T12, L1 vertebral body fractures with vertebroplasty cement at L1.    ASSESSMENT/PLAN:  Efrem Ramos is a 75 year old male with the following issues:  1. Monoclonal gammopathy of undetermined significance, IgG kappa  -I reviewed the 2/16/2021 labs with Alfredo.  His M-spike has decreased to 0.6.  He has no evidence of anemia of Hgb < 10 g/dL, hypercalcemia, renal failure, or other end organ disease at the present time.  -I reviewed his bone survey from 2/16/2021 which shows no lytic lesions.  -I discussed that he has at least 1-2% chance per year of developing multiple myeloma or other lymphoproliferative disorder.  -I recommended return in 1 year with repeat MGUS lab parameters.    2. History of vitamin B12 deficiency  -1/9/2020 Vitamin B12 level was elevated at 1212.  -He discontinued vitamin B12 supplement since last visit with normal B12 level.  Will recheck level prior to next visit.    3. Pulmonary fibrosis, history of pulmonary nodule  -Follows with Dr. Abad.  -2/10/2020 Chest CT scan showed stable pulmonary fibrosis, non-IPF type.  He also has bronchiectasis, severe CAD, and emphysema.    4. Moderate aortic stenosis  -Seen by Dr. Cortes previously in 8/2019.  -Intermediate to high risk surgical candidate.  Not felt to need TAVR at present time due to relative asymptomatic status.    5. Lumbar spine pain with history of L1 compression fracture  -His mid back pain is chronic since at least 2018 when he underwent vertebroplasty for his L1 compression fracture but subsequently suffered a fall and reinjuring his back.    - 9/13/2020 spine MRI showed no new fractures; no significant interval change compared to 3/2020.    Return in 1 year with labs done prior to visit.    Cherelle Hurley MD  Hematology/Oncology  [facility]

## 2021-02-24 ENCOUNTER — VIRTUAL VISIT (OUTPATIENT)
Dept: ONCOLOGY | Facility: CLINIC | Age: 76
End: 2021-02-24
Attending: INTERNAL MEDICINE
Payer: MEDICARE

## 2021-02-24 DIAGNOSIS — M54.50 LUMBAR SPINE PAIN: ICD-10-CM

## 2021-02-24 DIAGNOSIS — R91.8 PULMONARY NODULES: ICD-10-CM

## 2021-02-24 DIAGNOSIS — D47.2 MGUS (MONOCLONAL GAMMOPATHY OF UNKNOWN SIGNIFICANCE): Primary | ICD-10-CM

## 2021-02-24 DIAGNOSIS — E53.8 VITAMIN B12 DEFICIENCY (NON ANEMIC): ICD-10-CM

## 2021-02-24 PROCEDURE — 99442 PR PHYSICIAN TELEPHONE EVALUATION 11-20 MIN: CPT | Mod: 95 | Performed by: INTERNAL MEDICINE

## 2021-02-24 PROCEDURE — 999N001193 HC VIDEO/TELEPHONE VISIT; NO CHARGE

## 2021-02-24 ASSESSMENT — PAIN SCALES - GENERAL: PAINLEVEL: NO PAIN (0)

## 2021-02-24 NOTE — PROGRESS NOTES
Alfredo is a 75 year old who is being evaluated via a billable telephone visit.      What phone number would you like to be contacted at? [phone]  How would you like to obtain your AVS? [employer]inda  Phone call duration: 5 minutes

## 2021-02-24 NOTE — LETTER
"    2/24/2021         RE: Efrem Ramos  [address] [address]        Dear Colleague,    Thank you for referring your patient, Efrem Ramos, to the [facility]. Please see a copy of my visit note below.    [facility]    Hematology/Oncology Established Patient Follow-up Note      Today's Date: 02/24/20    Reason for Follow-up: MGUS, IgG kappa type.     Efrem Ramos is a 75 year old male who is being evaluated via a billable telephone visit.      The patient has been notified of following:     \"This telephone visit will be conducted via a call between you and your physician/provider. We have found that certain health care needs can be provided without the need for a physical exam.  This service lets us provide the care you need with a short phone conversation during the COVID-19 pandemic.  If a prescription is necessary we can send it directly to your pharmacy.  If lab work is needed we can place an order for that and you can then stop by our lab to have the test done at a later time.    Telephone visits are billed at different rates depending on your insurance coverage. During this emergency period, for some insurers they may be billed the same as an in-person visit.  Please reach out to your insurance provider with any questions.    If during the course of the call the physician/provider feels a telephone visit is not appropriate, you will not be charged for this service.\"    Patient has given verbal consent for Telephone visit?  Yes    How would you like to obtain your AVS? MyChart    Phone visit duration: 10 minutes    HISTORY OF PRESENT ILLNESS: Efrem Ramos is a 75 year old male who presents for follow-up of MGUS and vitamin B12 deficiency anemia.  He was previously followed by Dr. Alan Solomon for MGUS in 2013 when his M-spike was 0.6 g/dL.  He then transferred his care to Dr. Shae Aldana around 2015.  He underwent bone marrow biopsy " on 6/08/2017 which showed normocellular marrow with 0.3% monoclonal plasma cells; 1 of 20 metaphase cells had a hyperdiploid karyotype with gains of one extra copy of chromosomes 5, 7, 9, 15, and loss of 1 copy each of chromosomes 13 and 22; FISH showed loss of chromosome 13.  For his vitamin B12 deficiency anemia, he received oral B12 2000 mcg once per week.      INTERIM HISTORY:  Alfredo reports stable fatigue.  He has chronic right lower extremity weakness that he is attributed to prior blood clot.  Otherwise, he denies any fevers, chills, night sweats, unintentional weight loss, bowel or bladder dysfunction.      He was recently hospitalized for cardiac issues and is still recovering from it.      REVIEW OF SYSTEMS:   14 point ROS was reviewed and is negative other than as noted above in HPI.       HOME MEDICATIONS:  Current Outpatient Medications   Medication Sig Dispense Refill     acetaminophen (TYLENOL) 325 MG tablet Take 650 mg by mouth 3 times daily Plus 650 mg bid prn       apixaban ANTICOAGULANT (ELIQUIS) 5 MG tablet Take 1 tablet (5 mg) by mouth 2 times daily 180 tablet 3     atorvastatin (LIPITOR) 40 MG tablet Take 1 tablet (40 mg) by mouth daily 90 tablet 0     calcium carbonate 600 mg-vitamin D 400 units (CALTRATE) 600-400 MG-UNIT per tablet Take 1 tablet by mouth 2 times daily       clopidogrel (PLAVIX) 75 MG tablet Take 1 tablet (75 mg) by mouth daily 90 tablet 3     Cyanocobalamin 2000 MCG TABS Take 2,000 mcg by mouth every 7 days On Sundays       digoxin (LANOXIN) 125 MCG tablet Take 1 tablet (125 mcg) by mouth daily 90 tablet 3     gabapentin (NEURONTIN) 300 MG capsule TAKE 2 CAPSULES(600 MG) BY MOUTH EVERY EVENING 180 capsule 3     metoprolol tartrate (LOPRESSOR) 25 MG tablet Take 0.5 tablets (12.5 mg) by mouth 2 times daily 60 tablet 0     mirtazapine (REMERON) 15 MG tablet TAKE 1 TABLET(15 MG) BY MOUTH AT BEDTIME 90 tablet 0     multivitamin (OCUVITE) TABS Take 1 tablet by mouth 2 times daily         nitroGLYcerin (NITROSTAT) 0.4 MG sublingual tablet For chest pain place 1 tablet under the tongue every 5 minutes for 3 doses. If symptoms persist 5 minutes after 1st dose call 911. (Patient not taking: Reported on 7/10/2020) 20 tablet 0     polyethylene glycol (MIRALAX) 17 g packet Take 1 packet by mouth daily       potassium chloride ER (K-TAB) 20 MEQ CR tablet Take 1 tablet (20 mEq) by mouth daily 30 tablet 0         ALLERGIES:  Allergies   Allergen Reactions     Sulfa Drugs Difficulty breathing and Swelling     Adhesive Tape Blisters     Amiodarone Other (See Comments)     Developed pleural effusion     Penicillins Other (See Comments)     Reaction occurred as a child         PAST MEDICAL HISTORY:  Past Medical History:   Diagnosis Date     Atrial fibrillation (H)     amiodarone therapy discontinued due to pulmonary toxicity     Atrial flutter (H)      Benign essential hypertension 11/20/2018     Cancer (H) vocal cord     Carpal tunnel syndrome     abstracted 7/3/02.     Coronary artery disease involving native coronary artery of native heart without angina pectoris 5/8/2020    Cath 5/28/2020: patent stents; Cath 5/18/2020: ISABEL x4 to RCA; Cath 3/2020: 1 vessel disease; Cath 2017: moderate 2 vessel disease     CVA (cerebral infarction) 5/5/2015     Demyelinating disease of central nervous system, unspecified (H)     abstracted 7/3/02.     Dyspnea      ENCEPHALOPATHY UNSPECIFIED  3/15/2005    acute diseminated encephalitis     Femoral artery hematoma complicating cardiac catheterization     5/18/2020     History of thrombophlebitis      Mitral valve problem 8/18/2013    TRANSTHORACIC ECHOCARDIOGRAM 08/2013 There is a linear strand like projection seen in the LV cavity in diastole that I suspect is the posterior mitral leaflet although I cannot exclude a torn chordae or small vegetation       Mixed hyperlipidemia 3/15/2005     Nonrheumatic mitral valve stenosis      Other and unspecified noninfectious  gastroenteritis and colitis(558.9)     abstracted 7/3/02.     Pneumonia 8/17/2016     PVD (peripheral vascular disease) (H)      Redundant colon     needs CT colonography     Shingles      SKIN DISORDERS NEC 3/15/2005     Sleep apnea      SVT (supraventricular tachycardia) (H)          PAST SURGICAL HISTORY:  Past Surgical History:   Procedure Laterality Date     BIOPSY  brain 2002     BONE MARROW BIOPSY, BONE SPECIMEN, NEEDLE/TROCAR N/A 6/8/2017    Procedure: BIOPSY BONE MARROW;  UNILATERAL BONE MARROW BIOPSY (CONSCIOUS SEDATION) ;  Surgeon: Jamie Gonzales MD;  Location:  GI     CARDIAC CATHERIZATION  09/05/2017    2V CAD, IFR of RCA 0.95     CV CORONARY ANGIOGRAM N/A 3/23/2020    Procedure: Coronary Angiogram and right heart cath;  Surgeon: Gino Ferrer MD;  Location:  HEART CARDIAC CATH LAB     CV HEART CATHETERIZATION WITH POSSIBLE INTERVENTION N/A 5/28/2020    Procedure: Heart Catheterization with Possible Intervention;  Surgeon: Larry Chawla MD;  Location:  HEART CARDIAC CATH LAB     CV HEART CATHETERIZATION WITH POSSIBLE INTERVENTION N/A 5/18/2020    Procedure: Heart Catheterization with Possible Intervention;  Surgeon: Gaurang Swenson MD;  Location: [facility] CARDIAC CATH LAB     CV INSTANTANEOUS WAVE-FREE RATIO N/A 3/23/2020    Procedure: Instantaneous Wave-Free Ratio;  Surgeon: Gino Ferrer MD;  Location:  HEART CARDIAC CATH LAB     CV PCI ATHERECTOMY ORBITAL N/A 5/18/2020    Procedure: Percutaneous Coronary Intervention Atherectomy Rotational;  Surgeon: Gaurang Swenson MD;  Location:  HEART CARDIAC CATH LAB     CV PCI STENT DRUG ELUTING N/A 5/18/2020    Procedure: Percutaneous Coronary Intervention Stent Drug Eluting;  Surgeon: Gaurang Swenson MD;  Location:  HEART CARDIAC CATH LAB     CV RIGHT HEART CATH MEASUREMENTS RECORDED N/A 5/28/2020    Procedure: Right Heart Cath;  Surgeon: Larry Chawla MD;  Location:  HEART CARDIAC CATH LAB      CV TEMPORARY PACEMAKER INSERTION N/A 2020    Procedure: Temporary Pacemaker Insertion;  Surgeon: Gaurang Swenson MD;  Location:  HEART CARDIAC CATH LAB     ESOPHAGOSCOPY, GASTROSCOPY, DUODENOSCOPY (EGD), COMBINED N/A 2018    Procedure: COMBINED ESOPHAGOSCOPY, GASTROSCOPY, DUODENOSCOPY (EGD), BIOPSY SINGLE OR MULTIPLE;;  Surgeon: Xander Marie MD;  Location:  GI     HEAD & NECK SURGERY       ORTHOPEDIC SURGERY      right arm ulna reset after injury     THORACOSCOPIC WEDGE RESECTION LUNG Right 2016    Procedure: THORACOSCOPIC WEDGE RESECTION LUNG;  Surgeon: Abdelrahman Noriega MD;  Location:  OR     [facility] NONSPECIFIC PROCEDURE  as a child    T & A. abstracted 7/3/02.     ZZC NONSPECIFIC PROCEDURE  early    CTR. abstracted 7/3/02.         SOCIAL HISTORY:  Social History     Socioeconomic History     Marital status:      Spouse name: Not on file     Number of children: Not on file     Years of education: Not on file     Highest education level: Not on file   Occupational History     Not on file   Social Needs     Financial resource strain: Not on file     Food insecurity     Worry: Not on file     Inability: Not on file     Transportation needs     Medical: Not on file     Non-medical: Not on file   Tobacco Use     Smoking status: Former Smoker     Packs/day: 1.00     Years: 30.00     Pack years: 30.00     Types: Cigarettes     Quit date: 2013     Years since quittin.5     Smokeless tobacco: Never Used   Substance and Sexual Activity     Alcohol use: Yes     Alcohol/week: 42.0 standard drinks     Types: 42 Standard drinks or equivalent per week     Comment: occ     Drug use: No     Sexual activity: Not Currently   Lifestyle     Physical activity     Days per week: Not on file     Minutes per session: Not on file     Stress: Not on file   Relationships     Social connections     Talks on phone: Not on file     Gets together: Not on file     Attends Judaism  service: Not on file     Active member of club or organization: Not on file     Attends meetings of clubs or organizations: Not on file     Relationship status: Not on file     Intimate partner violence     Fear of current or ex partner: Not on file     Emotionally abused: Not on file     Physically abused: Not on file     Forced sexual activity: Not on file   Other Topics Concern     Parent/sibling w/ CABG, MI or angioplasty before 65F 55M? Not Asked      Service Not Asked     Blood Transfusions Not Asked     Caffeine Concern Yes     Comment: 1 Coke occasionally      Occupational Exposure Not Asked     Hobby Hazards Not Asked     Sleep Concern Not Asked     Stress Concern Not Asked     Weight Concern Not Asked     Special Diet No     Back Care Not Asked     Exercise No     Bike Helmet Not Asked     Seat Belt Not Asked     Self-Exams Not Asked   Social History Narrative    Retired (disability)          FAMILY HISTORY:  Family History   Problem Relation Age of Onset     Blood Disease Mother         Anemia     Cardiovascular Father      Cancer - colorectal Maternal Grandfather      Hypertension Brother      Diabetes Sister 5        Juvinile Diabetes passed at 36     Respiratory Other         Lung Cancer         PHYSICAL EXAM:  Vital signs:  There were no vitals taken for this visit.   Not performed as this was a telephone visit.    LABS:  CBC RESULTS:   Recent Labs   Lab Test 02/16/21  1255   WBC 10.4   RBC 3.83*   HGB 11.8*   HCT 37.5*   MCV 98   MCH 30.8   MCHC 31.5   RDW 15.5*        Last Comprehensive Metabolic Panel:  Sodium   Date Value Ref Range Status   02/16/2021 138 133 - 144 mmol/L Final     Potassium   Date Value Ref Range Status   02/16/2021 3.8 3.4 - 5.3 mmol/L Final     Chloride   Date Value Ref Range Status   02/16/2021 108 94 - 109 mmol/L Final     Carbon Dioxide   Date Value Ref Range Status   02/16/2021 26 20 - 32 mmol/L Final     Anion Gap   Date Value Ref Range Status    2021 4 3 - 14 mmol/L Final     Glucose   Date Value Ref Range Status   2021 92 70 - 99 mg/dL Final     Urea Nitrogen   Date Value Ref Range Status   2021 14 7 - 30 mg/dL Final     Creatinine   Date Value Ref Range Status   2021 0.92 0.66 - 1.25 mg/dL Final     GFR Estimate   Date Value Ref Range Status   2021 80 >60 mL/min/[1.73_m2] Final     Comment:     Non  GFR Calc  Starting 2018, serum creatinine based estimated GFR (eGFR) will be   calculated using the Chronic Kidney Disease Epidemiology Collaboration   (CKD-EPI) equation.       Calcium   Date Value Ref Range Status   2021 8.9 8.5 - 10.1 mg/dL Final     Bilirubin Total   Date Value Ref Range Status   2021 0.6 0.2 - 1.3 mg/dL Final     Alkaline Phosphatase   Date Value Ref Range Status   2021 115 40 - 150 U/L Final     ALT   Date Value Ref Range Status   2021 52 0 - 70 U/L Final     AST   Date Value Ref Range Status   2021 37 0 - 45 U/L Final     Vitamin B12 = 767    SPEP - M-spike:  2017: 0.4  2017: 0.4  3/21/2018: 0.4  2018: 0.5  2018: 0.5  2019: 0.7  2020: 0.8  2021: 0.6    2021:  IgA = 425  IgG = 1936  IgM = 164    PATHOLOGY:  2017 Bone marrow biopsy:   1.  Peripheral blood with mild normochromic normocytic anemia.   2.  Normocellular bone marrow with a 0.5% plasma cells with monoclonal   light chain restriction identified in 0.3% plasma cells  (MGUS).   Flow cytometry, cytogenetics, and FISH panel studies sent and pending   and be separately reported.   4.  Adequate bone marrow storage iron    IMAGIN/10/2020 CT chest without contrast:  1.  Pulmonary fibrosis, without significant change from the prior  study. CT features are most consistent with a non-IPF diagnosis.  Findings are nonspecific, though differential includes drug toxicity,  combined pulmonary fibrosis and emphysema, postinfectious change, and  other processes.  2.   Diffuse bronchial wall thickening and debris in the areas can be  seen with bronchitis.  3.  Bronchiectasis.  4.  Severe coronary artery disease.  5.  Emphysema.    2/16/2021: Bone survey showed calvarial lucency unchanged from 2018; T11, T12, L1 vertebral body fractures with vertebroplasty cement at L1.    ASSESSMENT/PLAN:  Efrem Ramos is a 75 year old male with the following issues:  1. Monoclonal gammopathy of undetermined significance, IgG kappa  -I reviewed the 2/16/2021 labs with Alfredo.  His M-spike has decreased to 0.6.  He has no evidence of anemia of Hgb < 10 g/dL, hypercalcemia, renal failure, or other end organ disease at the present time.  -I reviewed his bone survey from 2/16/2021 which shows no lytic lesions.  -I discussed that he has at least 1-2% chance per year of developing multiple myeloma or other lymphoproliferative disorder.  -I recommended return in 1 year with repeat MGUS lab parameters.    2. History of vitamin B12 deficiency  -1/9/2020 Vitamin B12 level was elevated at 1212.  -He discontinued vitamin B12 supplement since last visit with normal B12 level.  Will recheck level prior to next visit.    3. Pulmonary fibrosis, history of pulmonary nodule  -Follows with Dr. Abad.  -2/10/2020 Chest CT scan showed stable pulmonary fibrosis, non-IPF type.  He also has bronchiectasis, severe CAD, and emphysema.    4. Moderate aortic stenosis  -Seen by Dr. Cortes previously in 8/2019.  -Intermediate to high risk surgical candidate.  Not felt to need TAVR at present time due to relative asymptomatic status.    5. Lumbar spine pain with history of L1 compression fracture  -His mid back pain is chronic since at least 2018 when he underwent vertebroplasty for his L1 compression fracture but subsequently suffered a fall and reinjuring his back.    - 9/13/2020 spine MRI showed no new fractures; no significant interval change compared to 3/2020.    Return in 1 year with labs done prior to  visit.    Cherelle Hurley MD  Hematology/Oncology  [facility]      Alfredo is a 75 year old who is being evaluated via a billable telephone visit.      What phone number would you like to be contacted at? [phone]  How would you like to obtain your AVS? MyChart  Phone call duration: 5 minutes      Again, thank you for allowing me to participate in the care of your patient.        Sincerely,        Cherelle Hurley MD

## 2021-02-24 NOTE — PATIENT INSTRUCTIONS
RTC MD in 1 year with labs prior to visit    Patient requested a call in Oct 2021 to schedule- order deferred in [place]

## 2021-02-24 NOTE — LETTER
"    2/24/2021         RE: Efrem Ramos  [address] [facility] [address]        Dear Colleague,    Thank you for referring your patient, Efrem Ramos, to the [facility]. Please see a copy of my visit note below.    [facility]    Hematology/Oncology Established Patient Follow-up Note      Today's Date: 02/24/20    Reason for Follow-up: MGUS, IgG kappa type.     Efrem Ramos is a 75 year old male who is being evaluated via a billable telephone visit.      The patient has been notified of following:     \"This telephone visit will be conducted via a call between you and your physician/provider. We have found that certain health care needs can be provided without the need for a physical exam.  This service lets us provide the care you need with a short phone conversation during the COVID-19 pandemic.  If a prescription is necessary we can send it directly to your pharmacy.  If lab work is needed we can place an order for that and you can then stop by our lab to have the test done at a later time.    Telephone visits are billed at different rates depending on your insurance coverage. During this emergency period, for some insurers they may be billed the same as an in-person visit.  Please reach out to your insurance provider with any questions.    If during the course of the call the physician/provider feels a telephone visit is not appropriate, you will not be charged for this service.\"    Patient has given verbal consent for Telephone visit?  Yes    How would you like to obtain your AVS? MyChart    Phone visit duration: 10 minutes    HISTORY OF PRESENT ILLNESS: Efrem Ramos is a 75 year old male who presents for follow-up of MGUS and vitamin B12 deficiency anemia.  He was previously followed by Dr. Alan Solomon for MGUS in 2013 when his M-spike was 0.6 g/dL.  He then transferred his care to Dr. Shae Aldana around 2015.  He underwent bone marrow biopsy " on 6/08/2017 which showed normocellular marrow with 0.3% monoclonal plasma cells; 1 of 20 metaphase cells had a hyperdiploid karyotype with gains of one extra copy of chromosomes 5, 7, 9, 15, and loss of 1 copy each of chromosomes 13 and 22; FISH showed loss of chromosome 13.  For his vitamin B12 deficiency anemia, he received oral B12 2000 mcg once per week.      INTERIM HISTORY:  Alfredo reports stable fatigue.  He has chronic right lower extremity weakness that he is attributed to prior blood clot.  Otherwise, he denies any fevers, chills, night sweats, unintentional weight loss, bowel or bladder dysfunction.      He was recently hospitalized for cardiac issues and is still recovering from it.      REVIEW OF SYSTEMS:   14 point ROS was reviewed and is negative other than as noted above in HPI.       HOME MEDICATIONS:  Current Outpatient Medications   Medication Sig Dispense Refill     acetaminophen (TYLENOL) 325 MG tablet Take 650 mg by mouth 3 times daily Plus 650 mg bid prn       apixaban ANTICOAGULANT (ELIQUIS) 5 MG tablet Take 1 tablet (5 mg) by mouth 2 times daily 180 tablet 3     atorvastatin (LIPITOR) 40 MG tablet Take 1 tablet (40 mg) by mouth daily 90 tablet 0     calcium carbonate 600 mg-vitamin D 400 units (CALTRATE) 600-400 MG-UNIT per tablet Take 1 tablet by mouth 2 times daily       clopidogrel (PLAVIX) 75 MG tablet Take 1 tablet (75 mg) by mouth daily 90 tablet 3     Cyanocobalamin 2000 MCG TABS Take 2,000 mcg by mouth every 7 days On Sundays       digoxin (LANOXIN) 125 MCG tablet Take 1 tablet (125 mcg) by mouth daily 90 tablet 3     gabapentin (NEURONTIN) 300 MG capsule TAKE 2 CAPSULES(600 MG) BY MOUTH EVERY EVENING 180 capsule 3     metoprolol tartrate (LOPRESSOR) 25 MG tablet Take 0.5 tablets (12.5 mg) by mouth 2 times daily 60 tablet 0     mirtazapine (REMERON) 15 MG tablet TAKE 1 TABLET(15 MG) BY MOUTH AT BEDTIME 90 tablet 0     multivitamin (OCUVITE) TABS Take 1 tablet by mouth 2 times daily         nitroGLYcerin (NITROSTAT) 0.4 MG sublingual tablet For chest pain place 1 tablet under the tongue every 5 minutes for 3 doses. If symptoms persist 5 minutes after 1st dose call 911. (Patient not taking: Reported on 7/10/2020) 20 tablet 0     polyethylene glycol (MIRALAX) 17 g packet Take 1 packet by mouth daily       potassium chloride ER (K-TAB) 20 MEQ CR tablet Take 1 tablet (20 mEq) by mouth daily 30 tablet 0         ALLERGIES:  Allergies   Allergen Reactions     Sulfa Drugs Difficulty breathing and Swelling     Adhesive Tape Blisters     Amiodarone Other (See Comments)     Developed pleural effusion     Penicillins Other (See Comments)     Reaction occurred as a child         PAST MEDICAL HISTORY:  Past Medical History:   Diagnosis Date     Atrial fibrillation (H)     amiodarone therapy discontinued due to pulmonary toxicity     Atrial flutter (H)      Benign essential hypertension 11/20/2018     Cancer (H) vocal cord     Carpal tunnel syndrome     abstracted 7/3/02.     Coronary artery disease involving native coronary artery of native heart without angina pectoris 5/8/2020    Cath 5/28/2020: patent stents; Cath 5/18/2020: ISABEL x4 to RCA; Cath 3/2020: 1 vessel disease; Cath 2017: moderate 2 vessel disease     CVA (cerebral infarction) 5/5/2015     Demyelinating disease of central nervous system, unspecified (H)     abstracted 7/3/02.     Dyspnea      ENCEPHALOPATHY UNSPECIFIED  3/15/2005    acute diseminated encephalitis     Femoral artery hematoma complicating cardiac catheterization     5/18/2020     History of thrombophlebitis      Mitral valve problem 8/18/2013    TRANSTHORACIC ECHOCARDIOGRAM 08/2013 There is a linear strand like projection seen in the LV cavity in diastole that I suspect is the posterior mitral leaflet although I cannot exclude a torn chordae or small vegetation       Mixed hyperlipidemia 3/15/2005     Nonrheumatic mitral valve stenosis      Other and unspecified noninfectious  gastroenteritis and colitis(558.9)     abstracted 7/3/02.     Pneumonia 8/17/2016     PVD (peripheral vascular disease) (H)      Redundant colon     needs CT colonography     Shingles      SKIN DISORDERS NEC 3/15/2005     Sleep apnea      SVT (supraventricular tachycardia) (H)          PAST SURGICAL HISTORY:  Past Surgical History:   Procedure Laterality Date     BIOPSY  brain 2002     BONE MARROW BIOPSY, BONE SPECIMEN, NEEDLE/TROCAR N/A 6/8/2017    Procedure: BIOPSY BONE MARROW;  UNILATERAL BONE MARROW BIOPSY (CONSCIOUS SEDATION) ;  Surgeon: Jamie Gonzales MD;  Location:  GI     CARDIAC CATHERIZATION  09/05/2017    2V CAD, IFR of RCA 0.95     CV CORONARY ANGIOGRAM N/A 3/23/2020    Procedure: Coronary Angiogram and right heart cath;  Surgeon: Gino Ferrer MD;  Location:  HEART CARDIAC CATH LAB     CV HEART CATHETERIZATION WITH POSSIBLE INTERVENTION N/A 5/28/2020    Procedure: Heart Catheterization with Possible Intervention;  Surgeon: Larry Chawla MD;  Location:  HEART CARDIAC CATH LAB     CV HEART CATHETERIZATION WITH POSSIBLE INTERVENTION N/A 5/18/2020    Procedure: Heart Catheterization with Possible Intervention;  Surgeon: Gaurang Swenson MD;  Location: [facility] CARDIAC CATH LAB     CV INSTANTANEOUS WAVE-FREE RATIO N/A 3/23/2020    Procedure: Instantaneous Wave-Free Ratio;  Surgeon: Gino Ferrer MD;  Location:  HEART CARDIAC CATH LAB     CV PCI ATHERECTOMY ORBITAL N/A 5/18/2020    Procedure: Percutaneous Coronary Intervention Atherectomy Rotational;  Surgeon: Gaurang Swenson MD;  Location:  HEART CARDIAC CATH LAB     CV PCI STENT DRUG ELUTING N/A 5/18/2020    Procedure: Percutaneous Coronary Intervention Stent Drug Eluting;  Surgeon: Gaurang Swenson MD;  Location:  HEART CARDIAC CATH LAB     CV RIGHT HEART CATH MEASUREMENTS RECORDED N/A 5/28/2020    Procedure: Right Heart Cath;  Surgeon: Larry Chawla MD;  Location:  HEART CARDIAC CATH LAB      CV TEMPORARY PACEMAKER INSERTION N/A 2020    Procedure: Temporary Pacemaker Insertion;  Surgeon: Gaurang Swenson MD;  Location:  HEART CARDIAC CATH LAB     ESOPHAGOSCOPY, GASTROSCOPY, DUODENOSCOPY (EGD), COMBINED N/A 2018    Procedure: COMBINED ESOPHAGOSCOPY, GASTROSCOPY, DUODENOSCOPY (EGD), BIOPSY SINGLE OR MULTIPLE;;  Surgeon: Xander Marie MD;  Location:  GI     HEAD & NECK SURGERY       ORTHOPEDIC SURGERY      right arm ulna reset after injury     THORACOSCOPIC WEDGE RESECTION LUNG Right 2016    Procedure: THORACOSCOPIC WEDGE RESECTION LUNG;  Surgeon: Abdelrahman Noriega MD;  Location:  OR     [facility] NONSPECIFIC PROCEDURE  as a child    T & A. abstracted 7/3/02.     ZZC NONSPECIFIC PROCEDURE  early    CTR. abstracted 7/3/02.         SOCIAL HISTORY:  Social History     Socioeconomic History     Marital status:      Spouse name: Not on file     Number of children: Not on file     Years of education: Not on file     Highest education level: Not on file   Occupational History     Not on file   Social Needs     Financial resource strain: Not on file     Food insecurity     Worry: Not on file     Inability: Not on file     Transportation needs     Medical: Not on file     Non-medical: Not on file   Tobacco Use     Smoking status: Former Smoker     Packs/day: 1.00     Years: 30.00     Pack years: 30.00     Types: Cigarettes     Quit date: 2013     Years since quittin.5     Smokeless tobacco: Never Used   Substance and Sexual Activity     Alcohol use: Yes     Alcohol/week: 42.0 standard drinks     Types: 42 Standard drinks or equivalent per week     Comment: occ     Drug use: No     Sexual activity: Not Currently   Lifestyle     Physical activity     Days per week: Not on file     Minutes per session: Not on file     Stress: Not on file   Relationships     Social connections     Talks on phone: Not on file     Gets together: Not on file     Attends [employer]  service: Not on file     Active member of club or organization: Not on file     Attends meetings of clubs or organizations: Not on file     Relationship status: Not on file     Intimate partner violence     Fear of current or ex partner: Not on file     Emotionally abused: Not on file     Physically abused: Not on file     Forced sexual activity: Not on file   Other Topics Concern     Parent/sibling w/ CABG, MI or angioplasty before 65F 55M? Not Asked      Service Not Asked     Blood Transfusions Not Asked     Caffeine Concern Yes     Comment: 1 Coke occasionally      Occupational Exposure Not Asked     Hobby Hazards Not Asked     Sleep Concern Not Asked     Stress Concern Not Asked     Weight Concern Not Asked     Special Diet No     Back Care Not Asked     Exercise No     Bike Helmet Not Asked     Seat Belt Not Asked     Self-Exams Not Asked   Social History Narrative    Retired (disability)          FAMILY HISTORY:  Family History   Problem Relation Age of Onset     Blood Disease Mother         Anemia     Cardiovascular Father      Cancer - colorectal Maternal Grandfather      Hypertension Brother      Diabetes Sister 5        Juvinile Diabetes passed at 36     Respiratory Other         Lung Cancer         PHYSICAL EXAM:  Vital signs:  There were no vitals taken for this visit.   Not performed as this was a telephone visit.    LABS:  CBC RESULTS:   Recent Labs   Lab Test 02/16/21  1255   WBC 10.4   RBC 3.83*   HGB 11.8*   HCT 37.5*   MCV 98   MCH 30.8   MCHC 31.5   RDW 15.5*        Last Comprehensive Metabolic Panel:  Sodium   Date Value Ref Range Status   02/16/2021 138 133 - 144 mmol/L Final     Potassium   Date Value Ref Range Status   02/16/2021 3.8 3.4 - 5.3 mmol/L Final     Chloride   Date Value Ref Range Status   02/16/2021 108 94 - 109 mmol/L Final     Carbon Dioxide   Date Value Ref Range Status   02/16/2021 26 20 - 32 mmol/L Final     Anion Gap   Date Value Ref Range Status    2021 4 3 - 14 mmol/L Final     Glucose   Date Value Ref Range Status   2021 92 70 - 99 mg/dL Final     Urea Nitrogen   Date Value Ref Range Status   2021 14 7 - 30 mg/dL Final     Creatinine   Date Value Ref Range Status   2021 0.92 0.66 - 1.25 mg/dL Final     GFR Estimate   Date Value Ref Range Status   2021 80 >60 mL/min/[1.73_m2] Final     Comment:     Non  GFR Calc  Starting 2018, serum creatinine based estimated GFR (eGFR) will be   calculated using the Chronic Kidney Disease Epidemiology Collaboration   (CKD-EPI) equation.       Calcium   Date Value Ref Range Status   2021 8.9 8.5 - 10.1 mg/dL Final     Bilirubin Total   Date Value Ref Range Status   2021 0.6 0.2 - 1.3 mg/dL Final     Alkaline Phosphatase   Date Value Ref Range Status   2021 115 40 - 150 U/L Final     ALT   Date Value Ref Range Status   2021 52 0 - 70 U/L Final     AST   Date Value Ref Range Status   2021 37 0 - 45 U/L Final     Vitamin B12 = 767    SPEP - M-spike:  2017: 0.4  2017: 0.4  3/21/2018: 0.4  2018: 0.5  2018: 0.5  2019: 0.7  2020: 0.8  2021: 0.6    2021:  IgA = 425  IgG = 1936  IgM = 164    PATHOLOGY:  2017 Bone marrow biopsy:   1.  Peripheral blood with mild normochromic normocytic anemia.   2.  Normocellular bone marrow with a 0.5% plasma cells with monoclonal   light chain restriction identified in 0.3% plasma cells  (MGUS).   Flow cytometry, cytogenetics, and FISH panel studies sent and pending   and be separately reported.   4.  Adequate bone marrow storage iron    IMAGIN/10/2020 CT chest without contrast:  1.  Pulmonary fibrosis, without significant change from the prior  study. CT features are most consistent with a non-IPF diagnosis.  Findings are nonspecific, though differential includes drug toxicity,  combined pulmonary fibrosis and emphysema, postinfectious change, and  other processes.  2.   Diffuse bronchial wall thickening and debris in the areas can be  seen with bronchitis.  3.  Bronchiectasis.  4.  Severe coronary artery disease.  5.  Emphysema.    2/16/2021: Bone survey showed calvarial lucency unchanged from 2018; T11, T12, L1 vertebral body fractures with vertebroplasty cement at L1.    ASSESSMENT/PLAN:  Efrem Ramos is a 75 year old male with the following issues:  1. Monoclonal gammopathy of undetermined significance, IgG kappa  -I reviewed the 2/16/2021 labs with Alfredo.  His M-spike has decreased to 0.6.  He has no evidence of anemia of Hgb < 10 g/dL, hypercalcemia, renal failure, or other end organ disease at the present time.  -I reviewed his bone survey from 2/16/2021 which shows no lytic lesions.  -I discussed that he has at least 1-2% chance per year of developing multiple myeloma or other lymphoproliferative disorder.  -I recommended return in 1 year with repeat MGUS lab parameters.    2. History of vitamin B12 deficiency  -1/9/2020 Vitamin B12 level was elevated at 1212.  -He discontinued vitamin B12 supplement since last visit with normal B12 level.  Will recheck level prior to next visit.    3. Pulmonary fibrosis, history of pulmonary nodule  -Follows with Dr. Abad.  -2/10/2020 Chest CT scan showed stable pulmonary fibrosis, non-IPF type.  He also has bronchiectasis, severe CAD, and emphysema.    4. Moderate aortic stenosis  -Seen by Dr. Cortes previously in 8/2019.  -Intermediate to high risk surgical candidate.  Not felt to need TAVR at present time due to relative asymptomatic status.    5. Lumbar spine pain with history of L1 compression fracture  -His mid back pain is chronic since at least 2018 when he underwent vertebroplasty for his L1 compression fracture but subsequently suffered a fall and reinjuring his back.    - 9/13/2020 spine MRI showed no new fractures; no significant interval change compared to 3/2020.    Return in 1 year with labs done prior to  visit.    Cherelle Hurley MD  Hematology/Oncology  [facility]      Alfredo is a 75 year old who is being evaluated via a billable telephone visit.      What phone number would you like to be contacted at? [phone]  How would you like to obtain your AVS? MyChart  Phone call duration: 5 minutes      Again, thank you for allowing me to participate in the care of your patient.        Sincerely,        Cherelle Hurley MD

## 2021-03-10 DIAGNOSIS — F32.A DEPRESSION, UNSPECIFIED DEPRESSION TYPE: ICD-10-CM

## 2021-03-11 RX ORDER — MIRTAZAPINE 15 MG/1
15 TABLET, FILM COATED ORAL AT BEDTIME
Qty: 30 TABLET | Refills: 0 | Status: SHIPPED | OUTPATIENT
Start: 2021-03-11 | End: 2021-04-09

## 2021-04-29 DIAGNOSIS — I48.20 CHRONIC A-FIB (H): ICD-10-CM

## 2021-04-29 RX ORDER — METOPROLOL TARTRATE 25 MG/1
12.5 TABLET, FILM COATED ORAL 2 TIMES DAILY
Qty: 90 TABLET | Refills: 0 | Status: SHIPPED | OUTPATIENT
Start: 2021-04-29 | End: 2021-07-23 | Stop reason: DRUGHIGH

## 2021-05-08 DIAGNOSIS — F32.A DEPRESSION, UNSPECIFIED DEPRESSION TYPE: ICD-10-CM

## 2021-05-09 ENCOUNTER — HEALTH MAINTENANCE LETTER (OUTPATIENT)
Age: 76
End: 2021-05-09

## 2021-05-10 DIAGNOSIS — I25.10 CORONARY ARTERY DISEASE INVOLVING NATIVE CORONARY ARTERY OF NATIVE HEART WITHOUT ANGINA PECTORIS: ICD-10-CM

## 2021-05-10 RX ORDER — ATORVASTATIN CALCIUM 40 MG/1
40 TABLET, FILM COATED ORAL DAILY
Qty: 90 TABLET | Refills: 0 | Status: SHIPPED | OUTPATIENT
Start: 2021-05-10 | End: 2021-05-10

## 2021-05-10 RX ORDER — ATORVASTATIN CALCIUM 40 MG/1
40 TABLET, FILM COATED ORAL DAILY
Qty: 90 TABLET | Refills: 3 | Status: SHIPPED | OUTPATIENT
Start: 2021-05-10 | End: 2022-01-01

## 2021-05-10 RX ORDER — MIRTAZAPINE 15 MG/1
TABLET, FILM COATED ORAL
Qty: 30 TABLET | Refills: 0 | Status: SHIPPED | OUTPATIENT
Start: 2021-05-10 | End: 2021-06-10

## 2021-05-10 NOTE — TELEPHONE ENCOUNTER
Routing refill request to provider for review/approval because:  Edna given x1 and patient did not follow up, please advise  Patient needs to be seen because it has been more than 1 year since last office visit.  Juliana Sharpe RN

## 2021-05-19 DIAGNOSIS — I10 BENIGN ESSENTIAL HYPERTENSION: Primary | ICD-10-CM

## 2021-05-19 RX ORDER — POTASSIUM CHLORIDE 1500 MG/1
20 TABLET, EXTENDED RELEASE ORAL DAILY
Qty: 30 TABLET | Refills: 0 | Status: SHIPPED | OUTPATIENT
Start: 2021-05-19 | End: 2021-06-18

## 2021-06-03 ENCOUNTER — HOSPITAL ENCOUNTER (OUTPATIENT)
Dept: CARDIOLOGY | Facility: CLINIC | Age: 76
Discharge: HOME | End: 2021-06-03
Attending: INTERNAL MEDICINE | Admitting: INTERNAL MEDICINE
Payer: MEDICARE

## 2021-06-03 DIAGNOSIS — I25.10 CORONARY ARTERY DISEASE INVOLVING NATIVE CORONARY ARTERY OF NATIVE HEART WITHOUT ANGINA PECTORIS: Primary | ICD-10-CM

## 2021-06-03 DIAGNOSIS — I35.0 AORTIC STENOSIS: ICD-10-CM

## 2021-06-03 PROCEDURE — 999N000208 ECHOCARDIOGRAM COMPLETE

## 2021-06-03 PROCEDURE — 255N000002 HC RX 255 OP 636: Performed by: INTERNAL MEDICINE

## 2021-06-03 PROCEDURE — 93306 TTE W/DOPPLER COMPLETE: CPT | Mod: 26 | Performed by: INTERNAL MEDICINE

## 2021-06-03 RX ADMIN — HUMAN ALBUMIN MICROSPHERES AND PERFLUTREN 9 ML: 10; .22 INJECTION, SOLUTION INTRAVENOUS at 14:28

## 2021-06-07 ENCOUNTER — OFFICE VISIT (OUTPATIENT)
Dept: CARDIOLOGY | Facility: CLINIC | Age: 76
End: 2021-06-07
Payer: MEDICARE

## 2021-06-07 VITALS
HEART RATE: 99 BPM | BODY MASS INDEX: 17.88 KG/M2 | WEIGHT: 118 LBS | DIASTOLIC BLOOD PRESSURE: 72 MMHG | SYSTOLIC BLOOD PRESSURE: 104 MMHG | HEIGHT: 68 IN

## 2021-06-07 DIAGNOSIS — I48.19 PERSISTENT ATRIAL FIBRILLATION (H): ICD-10-CM

## 2021-06-07 DIAGNOSIS — I48.20 CHRONIC A-FIB (H): ICD-10-CM

## 2021-06-07 DIAGNOSIS — I50.32 CHRONIC DIASTOLIC CONGESTIVE HEART FAILURE (H): ICD-10-CM

## 2021-06-07 DIAGNOSIS — I35.0 AORTIC VALVE STENOSIS, ETIOLOGY OF CARDIAC VALVE DISEASE UNSPECIFIED: ICD-10-CM

## 2021-06-07 DIAGNOSIS — I25.10 CORONARY ARTERY DISEASE INVOLVING NATIVE CORONARY ARTERY OF NATIVE HEART WITHOUT ANGINA PECTORIS: Primary | ICD-10-CM

## 2021-06-07 PROCEDURE — 99214 OFFICE O/P EST MOD 30 MIN: CPT | Performed by: INTERNAL MEDICINE

## 2021-06-07 RX ORDER — FUROSEMIDE 20 MG
20 TABLET ORAL ONCE
Status: DISCONTINUED | OUTPATIENT
Start: 2021-06-07 | End: 2021-06-22

## 2021-06-07 ASSESSMENT — MIFFLIN-ST. JEOR: SCORE: 1234.74

## 2021-06-07 NOTE — PROGRESS NOTES
HPI and Plan:     Alfredo Ramos is a 75-year-old very frail gentleman history of coronary disease atrial fibrillation diastolic heart failure aortic stenosis tricuspid regurgitation who gets quite dyspneic with any exertion.  He also has spinal disease walks very slowly with a walker and is quite frail.  In anticipation for transcatheter aortic valve replacement he underwent an angiogram where he had 4 stents placed subsequently had hemorrhage of his left femoral site and shock.  He had a prolonged recovery from this.    He has subsequently of late put on about 8 pounds.  He is still quite underweight at 118 pounds and 5 foot 8.  We talked about his echocardiogram results and reviewed them personally and images reviewed showing now severe tricuspid regurgitation with severe aortic valve stenosis low-flow low gradient valve area 0.5 gradient 18.    Assessment is a complex and difficult case.  I believe that his frailty really at this time does not warrant intervention.  I have asked him to try and gain weight gain strength with exercises calorie supplementation boost and get more towards a weight of 130 or greater pounds.  However the aortic valve is likely to progress and may be in at least part contributing to his tricuspid regurgitation.  I am starting him out on low-dose diuretics to see if this then can improve his breathing given the severe tricuspid regurgitation.  We will get follow-up with our [facility] is in a BMP in 2 weeks.  Of time.    Orders Placed This Encounter   Procedures     Basic metabolic panel     Follow-Up with Cardiac Advanced Practice Provider     Follow-Up with Cardiologist     Orders Placed This Encounter   Medications     furosemide (LASIX) tablet 20 mg     There are no discontinued medications.      Encounter Diagnoses   Name Primary?     Aortic valve stenosis, etiology of cardiac valve disease unspecified      Coronary artery disease involving native coronary artery of native heart without  angina pectoris Yes     Persistent atrial fibrillation (H)      Chronic a-fib (H)      Chronic diastolic congestive heart failure (H)        CURRENT MEDICATIONS:  Current Outpatient Medications   Medication Sig Dispense Refill     acetaminophen (TYLENOL) 325 MG tablet Take 650 mg by mouth 3 times daily Plus 650 mg bid prn       apixaban ANTICOAGULANT (ELIQUIS) 5 MG tablet Take 1 tablet (5 mg) by mouth 2 times daily 180 tablet 3     atorvastatin (LIPITOR) 40 MG tablet Take 1 tablet (40 mg) by mouth daily 90 tablet 3     calcium carbonate 600 mg-vitamin D 400 units (CALTRATE) 600-400 MG-UNIT per tablet Take 1 tablet by mouth 2 times daily       clopidogrel (PLAVIX) 75 MG tablet Take 1 tablet (75 mg) by mouth daily 90 tablet 3     digoxin (LANOXIN) 125 MCG tablet Take 1 tablet (125 mcg) by mouth daily 90 tablet 3     gabapentin (NEURONTIN) 300 MG capsule TAKE 2 CAPSULES(600 MG) BY MOUTH EVERY EVENING 180 capsule 3     melatonin 1 MG TABS tablet Take 1 mg by mouth nightly as needed for sleep       metoprolol tartrate (LOPRESSOR) 25 MG tablet Take 0.5 tablets (12.5 mg) by mouth 2 times daily Please call [phone] for an appointment. 90 tablet 0     mirtazapine (REMERON) 15 MG tablet TAKE 1 TABLET(15 MG) BY MOUTH AT BEDTIME 30 tablet 0     multivitamin (OCUVITE) TABS Take 1 tablet by mouth 2 times daily        polyethylene glycol (MIRALAX) 17 g packet Take 1 packet by mouth daily       potassium chloride ER (K-TAB) 20 MEQ CR tablet Take 1 tablet (20 mEq) by mouth daily 30 tablet 0     Cyanocobalamin 2000 MCG TABS Take 2,000 mcg by mouth every 7 days On Sundays         ALLERGIES     Allergies   Allergen Reactions     Sulfa Drugs Difficulty breathing and Swelling     Adhesive Tape Blisters     Amiodarone Other (See Comments)     Developed pleural effusion     Penicillins Other (See Comments)     Reaction occurred as a child       PAST MEDICAL HISTORY:  Past Medical History:   Diagnosis Date     Atrial fibrillation (H)      amiodarone therapy discontinued due to pulmonary toxicity     Atrial flutter (H)      Benign essential hypertension 11/20/2018     Cancer (H) vocal cord     Carpal tunnel syndrome     abstracted 7/3/02.     Coronary artery disease involving native coronary artery of native heart without angina pectoris 5/8/2020    Cath 5/28/2020: patent stents; Cath 5/18/2020: ISABEL x4 to RCA; Cath 3/2020: 1 vessel disease; Cath 2017: moderate 2 vessel disease     CVA (cerebral infarction) 5/5/2015     Demyelinating disease of central nervous system, unspecified (H)     abstracted 7/3/02.     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     Pneumonia 8/17/2016     PVD (peripheral vascular disease) (H)      Redundant colon     needs CT colonography     Shingles      SKIN DISORDERS NEC 3/15/2005     Sleep apnea      SVT (supraventricular tachycardia) (H)        PAST SURGICAL HISTORY:  Past Surgical History:   Procedure Laterality Date     BIOPSY  brain 2002     BONE MARROW BIOPSY, BONE SPECIMEN, NEEDLE/TROCAR N/A 6/8/2017    Procedure: BIOPSY BONE MARROW;  UNILATERAL BONE MARROW BIOPSY (CONSCIOUS SEDATION) ;  Surgeon: Jamie Gonzales MD;  Location:  GI     CARDIAC CATHERIZATION  09/05/2017    2V CAD, IFR of RCA 0.95     CV CORONARY ANGIOGRAM N/A 3/23/2020    Procedure: Coronary Angiogram and right heart cath;  Surgeon: Gino Ferrer MD;   Location:  HEART CARDIAC CATH LAB     CV HEART CATHETERIZATION WITH POSSIBLE INTERVENTION N/A 5/28/2020    Procedure: Heart Catheterization with Possible Intervention;  Surgeon: Larry Chawla MD;  Location:  HEART CARDIAC CATH LAB     CV HEART CATHETERIZATION WITH POSSIBLE INTERVENTION N/A 5/18/2020    Procedure: Heart Catheterization with Possible Intervention;  Surgeon: Gaurang Swenson MD;  Location:  HEART CARDIAC CATH LAB     CV INSTANTANEOUS WAVE-FREE RATIO N/A 3/23/2020    Procedure: Instantaneous Wave-Free Ratio;  Surgeon: Gino Ferrer MD;  Location:  HEART CARDIAC CATH LAB     CV PCI ATHERECTOMY ORBITAL N/A 5/18/2020    Procedure: Percutaneous Coronary Intervention Atherectomy Rotational;  Surgeon: Gaurang Swenson MD;  Location:  HEART CARDIAC CATH LAB     CV PCI STENT DRUG ELUTING N/A 5/18/2020    Procedure: Percutaneous Coronary Intervention Stent Drug Eluting;  Surgeon: Gaurang Swenson MD;  Location:  HEART CARDIAC CATH LAB     CV RIGHT HEART CATH MEASUREMENTS RECORDED N/A 5/28/2020    Procedure: Right Heart Cath;  Surgeon: Larry Chawla MD;  Location:  HEART CARDIAC CATH LAB     CV TEMPORARY PACEMAKER INSERTION N/A 5/18/2020    Procedure: Temporary Pacemaker Insertion;  Surgeon: Gaurang Swenson MD;  Location:  HEART CARDIAC CATH LAB     ESOPHAGOSCOPY, GASTROSCOPY, DUODENOSCOPY (EGD), COMBINED N/A 9/8/2018    Procedure: COMBINED ESOPHAGOSCOPY, GASTROSCOPY, DUODENOSCOPY (EGD), BIOPSY SINGLE OR MULTIPLE;;  Surgeon: Xander Marie MD;  Location:  GI     HEAD & NECK SURGERY       ORTHOPEDIC SURGERY      right arm ulna reset after injury     THORACOSCOPIC WEDGE RESECTION LUNG Right 8/2/2016    Procedure: THORACOSCOPIC WEDGE RESECTION LUNG;  Surgeon: Abdelrahman Noriega MD;  Location:  OR     [facility] NONSPECIFIC PROCEDURE  as a child    T & A. abstracted 7/3/02.     [facility] NONSPECIFIC PROCEDURE  early    CTR. abstracted 7/3/02.        FAMILY HISTORY:  Family History   Problem Relation Age of Onset     Blood Disease Mother         Anemia     Cardiovascular Father      Cancer - colorectal Maternal Grandfather      Hypertension Brother      Diabetes Sister 5        Juvinile Diabetes passed at 36     Respiratory Other         Lung Cancer       SOCIAL HISTORY:  Social History     Socioeconomic History     Marital status:      Spouse name: None     Number of children: None     Years of education: None     Highest education level: None   Occupational History     None   Social Needs     Financial resource strain: None     Food insecurity     Worry: None     Inability: None     Transportation needs     Medical: None     Non-medical: None   Tobacco Use     Smoking status: Former Smoker     Packs/day: 1.00     Years: 30.00     Pack years: 30.00     Types: Cigarettes     Quit date: 2013     Years since quittin.8     Smokeless tobacco: Never Used   Substance and Sexual Activity     Alcohol use: Yes     Alcohol/week: 42.0 standard drinks     Types: 42 Standard drinks or equivalent per week     Comment: occ     Drug use: No     Sexual activity: Not Currently   Lifestyle     Physical activity     Days per week: None     Minutes per session: None     Stress: None   Relationships     Social connections     Talks on phone: None     Gets together: None     Attends [employer] service: None     Active member of club or organization: None     Attends meetings of clubs or organizations: None     Relationship status: None     Intimate partner violence     Fear of current or ex partner: None     Emotionally abused: None     Physically abused: None     Forced sexual activity: None   Other Topics Concern     Parent/sibling w/ CABG, MI or angioplasty before 65F 55M? Not Asked      Service Not Asked     Blood Transfusions Not Asked     Caffeine Concern Yes     Comment: 1 Coke occasionally      Occupational Exposure Not Asked     Hobby Hazards Not  "Asked     Sleep Concern Not Asked     Stress Concern Not Asked     Weight Concern Not Asked     Special Diet No     Back Care Not Asked     Exercise No     Bike Helmet Not Asked     Seat Belt Not Asked     Self-Exams Not Asked   Social History Narrative    Retired (disability)        Review of Systems:  Skin:  Negative rash   Eyes:  Positive for glasses  ENT:  Negative postnasal drainage;hearing loss  Respiratory:  Positive for dyspnea on exertion;sleep apnea;CPAP  Cardiovascular:  Negative for;palpitations;chest pain;edema Positive for;fatigue;lightheadedness  Gastroenterology: Positive for diarrhea  Genitourinary:  Negative urgency  Musculoskeletal:  Positive for joint pain;arthritis;muscular weakness;back pain  Neurologic:  Positive for stroke;incoordination;memory problems;headaches  Psychiatric:  Positive for sleep disturbances;excessive stress;anxiety;depression  Heme/Lymph/Imm:  Positive for allergies  Endocrine:  Negative      Physical Exam:  Vitals: /72   Pulse 99   Ht 1.727 m (5' 8\")   Wt 53.5 kg (118 lb)   BMI 17.94 kg/m      Constitutional:           Skin:             Head:           Eyes:           Lymph:      ENT:           Neck:           Respiratory:            Cardiac:                                                           GI:           Extremities and Muscular Skeletal:                 Neurological:           Psych:         Recent Lab Results:  LIPID RESULTS:  Lab Results   Component Value Date    CHOL 118 06/24/2019    HDL 51 06/24/2019    LDL 51 06/24/2019    TRIG 80 06/24/2019    CHOLHDLRATIO 2.7 09/04/2015       LIVER ENZYME RESULTS:  Lab Results   Component Value Date    AST 37 02/16/2021    ALT 52 02/16/2021       CBC RESULTS:  Lab Results   Component Value Date    WBC 10.4 02/16/2021    RBC 3.83 (L) 02/16/2021    HGB 11.8 (L) 02/16/2021    HCT 37.5 (L) 02/16/2021    MCV 98 02/16/2021    MCH 30.8 02/16/2021    MCHC 31.5 02/16/2021    RDW 15.5 (H) 02/16/2021    PLT " 344 02/16/2021       BMP RESULTS:  Lab Results   Component Value Date     02/16/2021    POTASSIUM 3.8 02/16/2021    CHLORIDE 108 02/16/2021    CO2 26 02/16/2021    ANIONGAP 4 02/16/2021    GLC 92 02/16/2021    BUN 14 02/16/2021    CR 0.92 02/16/2021    GFRESTIMATED 80 02/16/2021    GFRESTBLACK >90 02/16/2021    ULISSES 8.9 02/16/2021        A1C RESULTS:  Lab Results   Component Value Date    A1C 5.3 09/04/2015       INR RESULTS:  Lab Results   Component Value Date    INR 1.51 (H) 05/18/2020    INR 1.18 (H) 05/18/2020           CC  No referring provider defined for this encounter.

## 2021-06-07 NOTE — LETTER
6/7/2021    Danny Paige MD, MD  [address]  [facility] [address]    RE: Efrem Ramos       Dear Colleague,    I had the pleasure of seeing Efrem Ramos in the [facility].    HPI and Plan:     Alfredo Ramos is a 75-year-old very frail gentleman history of coronary disease atrial fibrillation diastolic heart failure aortic stenosis tricuspid regurgitation who gets quite dyspneic with any exertion.  He also has spinal disease walks very slowly with a walker and is quite frail.  In anticipation for transcatheter aortic valve replacement he underwent an angiogram where he had 4 stents placed subsequently had hemorrhage of his left femoral site and shock.  He had a prolonged recovery from this.    He has subsequently of late put on about 8 pounds.  He is still quite underweight at 118 pounds and 5 foot 8.  We talked about his echocardiogram results and reviewed them personally and images reviewed showing now severe tricuspid regurgitation with severe aortic valve stenosis low-flow low gradient valve area 0.5 gradient 18.    Assessment is a complex and difficult case.  I believe that his frailty really at this time does not warrant intervention.  I have asked him to try and gain weight gain strength with exercises calorie supplementation boost and get more towards a weight of 130 or greater pounds.  However the aortic valve is likely to progress and may be in at least part contributing to his tricuspid regurgitation.  I am starting him out on low-dose diuretics to see if this then can improve his breathing given the severe tricuspid regurgitation.  We will get follow-up with our [facility] is in a BMP in 2 weeks.  Of time.    Orders Placed This Encounter   Procedures     Basic metabolic panel     Follow-Up with Cardiac Advanced Practice Provider     Follow-Up with Cardiologist     Orders Placed This Encounter   Medications     furosemide (LASIX)  tablet 20 mg     There are no discontinued medications.      Encounter Diagnoses   Name Primary?     Aortic valve stenosis, etiology of cardiac valve disease unspecified      Coronary artery disease involving native coronary artery of native heart without angina pectoris Yes     Persistent atrial fibrillation (H)      Chronic a-fib (H)      Chronic diastolic congestive heart failure (H)        CURRENT MEDICATIONS:  Current Outpatient Medications   Medication Sig Dispense Refill     acetaminophen (TYLENOL) 325 MG tablet Take 650 mg by mouth 3 times daily Plus 650 mg bid prn       apixaban ANTICOAGULANT (ELIQUIS) 5 MG tablet Take 1 tablet (5 mg) by mouth 2 times daily 180 tablet 3     atorvastatin (LIPITOR) 40 MG tablet Take 1 tablet (40 mg) by mouth daily 90 tablet 3     calcium carbonate 600 mg-vitamin D 400 units (CALTRATE) 600-400 MG-UNIT per tablet Take 1 tablet by mouth 2 times daily       clopidogrel (PLAVIX) 75 MG tablet Take 1 tablet (75 mg) by mouth daily 90 tablet 3     digoxin (LANOXIN) 125 MCG tablet Take 1 tablet (125 mcg) by mouth daily 90 tablet 3     gabapentin (NEURONTIN) 300 MG capsule TAKE 2 CAPSULES(600 MG) BY MOUTH EVERY EVENING 180 capsule 3     melatonin 1 MG TABS tablet Take 1 mg by mouth nightly as needed for sleep       metoprolol tartrate (LOPRESSOR) 25 MG tablet Take 0.5 tablets (12.5 mg) by mouth 2 times daily Please call [phone] for an appointment. 90 tablet 0     mirtazapine (REMERON) 15 MG tablet TAKE 1 TABLET(15 MG) BY MOUTH AT BEDTIME 30 tablet 0     multivitamin (OCUVITE) TABS Take 1 tablet by mouth 2 times daily        polyethylene glycol (MIRALAX) 17 g packet Take 1 packet by mouth daily       potassium chloride ER (K-TAB) 20 MEQ CR tablet Take 1 tablet (20 mEq) by mouth daily 30 tablet 0     Cyanocobalamin 2000 MCG TABS Take 2,000 mcg by mouth every 7 days On Sundays         ALLERGIES     Allergies   Allergen Reactions     Sulfa Drugs Difficulty breathing and Swelling      Adhesive Tape Blisters     Amiodarone Other (See Comments)     Developed pleural effusion     Penicillins Other (See Comments)     Reaction occurred as a child       PAST MEDICAL HISTORY:  Past Medical History:   Diagnosis Date     Atrial fibrillation (H)     amiodarone therapy discontinued due to pulmonary toxicity     Atrial flutter (H)      Benign essential hypertension 11/20/2018     Cancer (H) vocal cord     Carpal tunnel syndrome     abstracted 7/3/02.     Coronary artery disease involving native coronary artery of native heart without angina pectoris 5/8/2020    Cath 5/28/2020: patent stents; Cath 5/18/2020: ISABEL x4 to RCA; Cath 3/2020: 1 vessel disease; Cath 2017: moderate 2 vessel disease     CVA (cerebral infarction) 5/5/2015     Demyelinating disease of central nervous system, unspecified (H)     abstracted 7/3/02.     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     Pneumonia 8/17/2016     PVD (peripheral vascular disease) (H)      Redundant colon     needs CT colonography     Shingles      SKIN DISORDERS NEC 3/15/2005     Sleep apnea      SVT (supraventricular tachycardia) (H)        PAST SURGICAL HISTORY:  Past Surgical History:   Procedure Laterality Date     BIOPSY  brain 2002     BONE MARROW BIOPSY, BONE SPECIMEN, NEEDLE/TROCAR N/A 6/8/2017    Procedure: BIOPSY BONE MARROW;  UNILATERAL BONE MARROW BIOPSY (CONSCIOUS SEDATION) ;   Surgeon: Jamie Gonzales MD;  Location:  GI     CARDIAC CATHERIZATION  09/05/2017    2V CAD, IFR of RCA 0.95     CV CORONARY ANGIOGRAM N/A 3/23/2020    Procedure: Coronary Angiogram and right heart cath;  Surgeon: Gino Ferrer MD;  Location:  HEART CARDIAC CATH LAB     CV HEART CATHETERIZATION WITH POSSIBLE INTERVENTION N/A 5/28/2020    Procedure: Heart Catheterization with Possible Intervention;  Surgeon: Larry Chawla MD;  Location:  HEART CARDIAC CATH LAB     CV HEART CATHETERIZATION WITH POSSIBLE INTERVENTION N/A 5/18/2020    Procedure: Heart Catheterization with Possible Intervention;  Surgeon: Gaurang Swenson MD;  Location:  HEART CARDIAC CATH LAB     CV INSTANTANEOUS WAVE-FREE RATIO N/A 3/23/2020    Procedure: Instantaneous Wave-Free Ratio;  Surgeon: Gino Ferrer MD;  Location:  HEART CARDIAC CATH LAB     CV PCI ATHERECTOMY ORBITAL N/A 5/18/2020    Procedure: Percutaneous Coronary Intervention Atherectomy Rotational;  Surgeon: Gaurang Swenson MD;  Location:  HEART CARDIAC CATH LAB     CV PCI STENT DRUG ELUTING N/A 5/18/2020    Procedure: Percutaneous Coronary Intervention Stent Drug Eluting;  Surgeon: Gaurang Swenson MD;  Location:  HEART CARDIAC CATH LAB     CV RIGHT HEART CATH MEASUREMENTS RECORDED N/A 5/28/2020    Procedure: Right Heart Cath;  Surgeon: Larry Chawla MD;  Location:  HEART CARDIAC CATH LAB     CV TEMPORARY PACEMAKER INSERTION N/A 5/18/2020    Procedure: Temporary Pacemaker Insertion;  Surgeon: Gaurang Swenson MD;  Location:  HEART CARDIAC CATH LAB     ESOPHAGOSCOPY, GASTROSCOPY, DUODENOSCOPY (EGD), COMBINED N/A 9/8/2018    Procedure: COMBINED ESOPHAGOSCOPY, GASTROSCOPY, DUODENOSCOPY (EGD), BIOPSY SINGLE OR MULTIPLE;;  Surgeon: Xander Marie MD;  Location:  GI     HEAD & NECK SURGERY       ORTHOPEDIC SURGERY      right arm ulna reset after injury     THORACOSCOPIC WEDGE RESECTION LUNG Right 8/2/2016     Procedure: THORACOSCOPIC WEDGE RESECTION LUNG;  Surgeon: Abdelrahman Noriega MD;  Location:  OR     [facility] NONSPECIFIC PROCEDURE  as a child    T & A. abstracted 7/3/02.     ZZC NONSPECIFIC PROCEDURE  early    CTR. abstracted 7/3/02.       FAMILY HISTORY:  Family History   Problem Relation Age of Onset     Blood Disease Mother         Anemia     Cardiovascular Father      Cancer - colorectal Maternal Grandfather      Hypertension Brother      Diabetes Sister 5        Juvinile Diabetes passed at 36     Respiratory Other         Lung Cancer       SOCIAL HISTORY:  Social History     Socioeconomic History     Marital status:      Spouse name: None     Number of children: None     Years of education: None     Highest education level: None   Occupational History     None   Social Needs     Financial resource strain: None     Food insecurity     Worry: None     Inability: None     Transportation needs     Medical: None     Non-medical: None   Tobacco Use     Smoking status: Former Smoker     Packs/day: 1.00     Years: 30.00     Pack years: 30.00     Types: Cigarettes     Quit date: 2013     Years since quittin.8     Smokeless tobacco: Never Used   Substance and Sexual Activity     Alcohol use: Yes     Alcohol/week: 42.0 standard drinks     Types: 42 Standard drinks or equivalent per week     Comment: occ     Drug use: No     Sexual activity: Not Currently   Lifestyle     Physical activity     Days per week: None     Minutes per session: None     Stress: None   Relationships     Social connections     Talks on phone: None     Gets together: None     Attends Religion service: None     Active member of club or organization: None     Attends meetings of clubs or organizations: None     Relationship status: None     Intimate partner violence     Fear of current or ex partner: None     Emotionally abused: None     Physically abused: None     Forced sexual activity: None   Other Topics Concern      "Parent/sibling w/ CABG, MI or angioplasty before 65F 55M? Not Asked      Service Not Asked     Blood Transfusions Not Asked     Caffeine Concern Yes     Comment: 1 Coke occasionally      Occupational Exposure Not Asked     Hobby Hazards Not Asked     Sleep Concern Not Asked     Stress Concern Not Asked     Weight Concern Not Asked     Special Diet No     Back Care Not Asked     Exercise No     Bike Helmet Not Asked     Seat Belt Not Asked     Self-Exams Not Asked   Social History Narrative    Retired (disability)        Review of Systems:  Skin:  Negative rash   Eyes:  Positive for glasses  ENT:  Negative postnasal drainage;hearing loss  Respiratory:  Positive for dyspnea on exertion;sleep apnea;CPAP  Cardiovascular:  Negative for;palpitations;chest pain;edema Positive for;fatigue;lightheadedness  Gastroenterology: Positive for diarrhea  Genitourinary:  Negative urgency  Musculoskeletal:  Positive for joint pain;arthritis;muscular weakness;back pain  Neurologic:  Positive for stroke;incoordination;memory problems;headaches  Psychiatric:  Positive for sleep disturbances;excessive stress;anxiety;depression  Heme/Lymph/Imm:  Positive for allergies  Endocrine:  Negative      Physical Exam:  Vitals: /72   Pulse 99   Ht 1.727 m (5' 8\")   Wt 53.5 kg (118 lb)   BMI 17.94 kg/m      Constitutional:           Skin:             Head:           Eyes:           Lymph:      ENT:           Neck:           Respiratory:            Cardiac:                                                           GI:           Extremities and Muscular Skeletal:                 Neurological:           Psych:         Recent Lab Results:  LIPID RESULTS:  Lab Results   Component Value Date    CHOL 118 06/24/2019    HDL 51 06/24/2019    LDL 51 06/24/2019    TRIG 80 06/24/2019    CHOLHDLRATIO 2.7 09/04/2015       LIVER ENZYME RESULTS:  Lab Results   Component Value Date    AST 37 02/16/2021    ALT 52 02/16/2021       CBC " RESULTS:  Lab Results   Component Value Date    WBC 10.4 02/16/2021    RBC 3.83 (L) 02/16/2021    HGB 11.8 (L) 02/16/2021    HCT 37.5 (L) 02/16/2021    MCV 98 02/16/2021    MCH 30.8 02/16/2021    MCHC 31.5 02/16/2021    RDW 15.5 (H) 02/16/2021     02/16/2021       BMP RESULTS:  Lab Results   Component Value Date     02/16/2021    POTASSIUM 3.8 02/16/2021    CHLORIDE 108 02/16/2021    CO2 26 02/16/2021    ANIONGAP 4 02/16/2021    GLC 92 02/16/2021    BUN 14 02/16/2021    CR 0.92 02/16/2021    GFRESTIMATED 80 02/16/2021    GFRESTBLACK >90 02/16/2021    ULISSES 8.9 02/16/2021        A1C RESULTS:  Lab Results   Component Value Date    A1C 5.3 09/04/2015       INR RESULTS:  Lab Results   Component Value Date    INR 1.51 (H) 05/18/2020    INR 1.18 (H) 05/18/2020     Thank you for allowing me to participate in the care of your patient.      Sincerely,     MILADYS ASH MD     [facility]  cc:   No referring provider defined for this encounter.

## 2021-06-10 DIAGNOSIS — F32.A DEPRESSION, UNSPECIFIED DEPRESSION TYPE: ICD-10-CM

## 2021-06-10 RX ORDER — MIRTAZAPINE 15 MG/1
TABLET, FILM COATED ORAL
Qty: 90 TABLET | Refills: 0 | Status: SHIPPED | OUTPATIENT
Start: 2021-06-10 | End: 2021-09-14

## 2021-06-10 NOTE — TELEPHONE ENCOUNTER
Routing refill request to provider for review/approval because:  Labs not current:    PHQ-9 and GAD7 Scores  10/1/2018   4/2/2020    PHQ-9 Total Score 24 3     Last office vist: 4/2/20    Pended 90 day supply & 0 refills. Please Review.    Next office visit: none scheduled    TC:  Please call to schedule PHQ-9 and Visit    Michelle Marino RN  [facility]

## 2021-06-16 NOTE — TELEPHONE ENCOUNTER
Connected with wife, C2C verified, and said they have been too hot to schedule harvinder'ts. Will call back to schedule after their AC is fixed.    Awaiting callback.

## 2021-06-18 DIAGNOSIS — I10 BENIGN ESSENTIAL HYPERTENSION: ICD-10-CM

## 2021-06-18 RX ORDER — POTASSIUM CHLORIDE 1500 MG/1
20 TABLET, EXTENDED RELEASE ORAL DAILY
Qty: 30 TABLET | Refills: 0 | Status: SHIPPED | OUTPATIENT
Start: 2021-06-18 | End: 2021-06-22

## 2021-06-21 NOTE — PROGRESS NOTES
"[facility]    I had the pleasure of seeing Alfredo when he came for follow up of diuretic initiation.  This 75 year old sees Dr. Xiong and has seen Dr. Cortes for his history of:    1. Severe low flow, low gradient AoS - echo 6/3/2021 with SEBASTIAN 0.5cm2 with mean gradient 19mmHg  2. CAD - noted during TAVR w/u 5/2020. S/p rotational atherectomy and ISABEL x 4 to prox-distal RCA. Complicated by hemorrhagic shock from LFA bleeding. 4 units of blood transfused.  3. Permanent AFib - rate control with digoxin and lopressor. Eliquis for CHADSVASc 6 (HTN, CVA, CAD, age)  4. Mild-moderate MS, severe TR - on echo 6/3/2021  5. H/o L PCA CVA - 2013  6. MEL - on CPAP  7. HFpEF  8. PVD - has R SFA not amenable to PCI  9. Dyslipidemia   10. MGUS - sees Dr. Xavi Cortes saw Alfredo 6/7/2021 at which time they discussed his prolonged hospitalization following angiogram complicated by hemorrhagic shock back in 2020. They reviewed his echo showing severe TR, severe AoS (low-flow gradient with SEBASTIAN 0.5 cm2 with gradient of 19 mmHg). He was very frail, at 118#. Low dose diuretic was started in an effort to improve HIGH. 2 week follow-up was rec'd and I'm meeting him today.  Weight was recommended to be >130# with calorie supplements.    Interval History:  He actually doesn't think he picked up the Lasix and we called [facility] to confirm - indeed, no Rx for Lasix was sent in following Dr. Cortes's appt 6/7/2021.  Has not really had any change in breathing at all, which makes sense as he's not started diuretics.    He's working on increasing calorie intake, hoping to get at least to 130# per Dr. Cortes's recommendation.  He drinks Boost every morning.  Though we typically have our CAD pts follow a low saturated fat diet, we are looking for high calorie nutritious food for him.      VITALS:  Vitals: /70   Pulse 79   Ht 1.727 m (5' 8\")   Wt 53.4 kg (117 lb 12.8 oz)   BMI 17.91 kg/m      Diagnostic " Testing:  Echocardiogram 6/3/2021 - EF 65-70%. Sev dilated RV, mod decreased RVSF. Severe MAC, 2+MR, mild-mod MS (3.7 mmHg @ 84 bpm). 3-4+TR. Sev AoS. Mean gradient 19mmHg with SEBASTIAN 0.59cm2  Angiogram 5/2020 - Patent RCA stents. No obstructive dz elsewhere. Mild pHTN. Mod-sev elevated RH filling pressures (mean RA 14 mmHg). Nl CO. SEBASTIAN 1.1 cm2, mean gradient 23 mmHg    Plan:  Lasix 20 mg daily  BMP/see me 2 weeks    Assessment/Plan:    1. Severe AoS, low flow    This has been followed over the last few years; he had a very prolonged recovery from his cath over 1 year ago 5/2020 resulting in 4 stents placed to RCA, complicated by LFA bleed and hemorrhagic shock    Dr. Cortes noted poor candidate for TAVR given extreme frailty. Calorie boosting rec'd. He's up 5# since 6/7 and drinking Boost in AM    PLAN:    Continue close follow-up       2. HFpEF    Has severe TR, mild MS (with AFib) and severe AoS in setting of nl EF on echo 6/3/2021    Lasix wasn't sent in to pharmacy 6/7 so he's not tried this. Not surprisingly, without change in HIGH      BMP stable    PLAN:    Start Lasix 20 mg daily    See me back 2-3 weeks with BMP        Odette Raymond PA-C, MSPAS      Orders Placed This Encounter   Procedures     Basic metabolic panel     Follow-Up with Cardiac Advanced Practice Provider     Orders Placed This Encounter   Medications     furosemide (LASIX) 20 MG tablet     Sig: Take 1 tablet (20 mg) by mouth daily     Dispense:  30 tablet     Refill:  0     Medications Discontinued During This Encounter   Medication Reason     furosemide (LASIX) tablet 20 mg Stopped by Patient     potassium chloride ER (K-TAB) 20 MEQ CR tablet Stopped by Patient     Cyanocobalamin 2000 MCG TABS          Encounter Diagnoses   Name Primary?     Aortic valve stenosis, etiology of cardiac valve disease unspecified      Coronary artery disease involving native coronary artery of native heart without angina pectoris      Persistent atrial fibrillation  (H)      Chronic a-fib (H)      Chronic diastolic congestive heart failure (H)        CURRENT MEDICATIONS:  Current Outpatient Medications   Medication Sig Dispense Refill     acetaminophen (TYLENOL) 325 MG tablet Take 650 mg by mouth 3 times daily Plus 650 mg bid prn       apixaban ANTICOAGULANT (ELIQUIS) 5 MG tablet Take 1 tablet (5 mg) by mouth 2 times daily 180 tablet 3     atorvastatin (LIPITOR) 40 MG tablet Take 1 tablet (40 mg) by mouth daily 90 tablet 3     calcium carbonate 600 mg-vitamin D 400 units (CALTRATE) 600-400 MG-UNIT per tablet Take 1 tablet by mouth 2 times daily       clopidogrel (PLAVIX) 75 MG tablet Take 1 tablet (75 mg) by mouth daily 90 tablet 3     digoxin (LANOXIN) 125 MCG tablet Take 1 tablet (125 mcg) by mouth daily 90 tablet 3     furosemide (LASIX) 20 MG tablet Take 1 tablet (20 mg) by mouth daily 30 tablet 0     gabapentin (NEURONTIN) 300 MG capsule TAKE 2 CAPSULES(600 MG) BY MOUTH EVERY EVENING 180 capsule 3     melatonin 1 MG TABS tablet Take 1 mg by mouth nightly as needed for sleep       metoprolol tartrate (LOPRESSOR) 25 MG tablet Take 0.5 tablets (12.5 mg) by mouth 2 times daily Please call [phone] for an appointment. 90 tablet 0     mirtazapine (REMERON) 15 MG tablet TAKE 1 TABLET(15 MG) BY MOUTH AT BEDTIME 90 tablet 0     multivitamin (OCUVITE) TABS Take 1 tablet by mouth 2 times daily        polyethylene glycol (MIRALAX) 17 g packet Take 1 packet by mouth daily         ALLERGIES     Allergies   Allergen Reactions     Sulfa Drugs Difficulty breathing and Swelling     Adhesive Tape Blisters     Amiodarone Other (See Comments)     Developed pleural effusion     Penicillins Other (See Comments)     Reaction occurred as a child         Review of Systems:  Skin:  Negative rash   Eyes:  Positive for glasses  ENT:  Negative postnasal drainage;hearing loss  Respiratory:  Positive for dyspnea on exertion;sleep apnea;CPAP  Cardiovascular:  Negative for;palpitations;chest pain;edema  "Positive for;fatigue;lightheadedness;dizziness  Gastroenterology: Positive for diarrhea  Genitourinary:  Negative urgency  Musculoskeletal:  Positive for joint pain;arthritis;muscular weakness;back pain  Neurologic:  Positive for stroke;incoordination;memory problems;headaches  Psychiatric:  Positive for sleep disturbances;excessive stress;anxiety;depression  Heme/Lymph/Imm:  Positive for allergies  Endocrine:  Negative      Physical Exam:  Vitals: /70   Pulse 79   Ht 1.727 m (5' 8\")   Wt 53.4 kg (117 lb 12.8 oz)   BMI 17.91 kg/m      Constitutional:  cooperative, alert and oriented, well developed, well nourished, in no acute distress frail      Skin:  warm and dry to the touch, no apparent skin lesions or masses noted        Head:  normocephalic, no masses or lesions        Eyes:  pupils equal and round;conjunctivae and lids unremarkable;sclera white        ENT:  not assessed this visit        Neck:  JVP normal transmitted murmur      Chest:  clear to auscultation;normal symmetry;no tenderness to palpation;normal respiratory excursion;no intercostal retraction;no use of accessory muscles fine rales bilateral upper lobes    Cardiac: no S3 or S4 irregular rhythm     systolic murmur;late systolic murmur;grade 3 systolic murmur;RUSB   S1, S2 regular with 3/6 ESM LUSB    Abdomen:  BS normoactive;non-tender        Vascular:   pulses below the femoral arteries are diminished                               R radial artery without hematoma, bruising or tenderness    Extremities and Back:  no deformities, clubbing, cyanosis, erythema observed   up to shin    Neurological:  no gross motor deficits;affect appropriate ataxia;limb weakness          PAST MEDICAL HISTORY:  Past Medical History:   Diagnosis Date     Atrial fibrillation (H)     amiodarone therapy discontinued due to pulmonary toxicity     Atrial flutter (H)      Benign essential hypertension 11/20/2018     Cancer (H) vocal cord     Carpal tunnel syndrome  "    abstracted 7/3/02.     Coronary artery disease involving native coronary artery of native heart without angina pectoris 5/8/2020    Cath 5/28/2020: patent stents; Cath 5/18/2020: ISABEL x4 to RCA; Cath 3/2020: 1 vessel disease; Cath 2017: moderate 2 vessel disease     CVA (cerebral infarction) 5/5/2015     Demyelinating disease of central nervous system, unspecified (H)     abstracted 7/3/02.     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     Pneumonia 8/17/2016     PVD (peripheral vascular disease) (H)      Redundant colon     needs CT colonography     Shingles      SKIN DISORDERS NEC 3/15/2005     Sleep apnea      SVT (supraventricular tachycardia) (H)        PAST SURGICAL HISTORY:  Past Surgical History:   Procedure Laterality Date     BIOPSY  brain 2002     BONE MARROW BIOPSY, BONE SPECIMEN, NEEDLE/TROCAR N/A 6/8/2017    Procedure: BIOPSY BONE MARROW;  UNILATERAL BONE MARROW BIOPSY (CONSCIOUS SEDATION) ;  Surgeon: Jamie Gonzales MD;  Location:  GI     CARDIAC CATHERIZATION  09/05/2017    2V CAD, IFR of RCA 0.95     CV CORONARY ANGIOGRAM N/A 3/23/2020    Procedure: Coronary Angiogram and right heart cath;  Surgeon: Gino Ferrer MD;  Location:  HEART CARDIAC CATH LAB     CV HEART CATHETERIZATION WITH POSSIBLE INTERVENTION N/A 5/28/2020    Procedure: Heart Catheterization with Possible Intervention;  Surgeon: Larry Chawla  MD Shae;  Location:  HEART CARDIAC CATH LAB     CV HEART CATHETERIZATION WITH POSSIBLE INTERVENTION N/A 5/18/2020    Procedure: Heart Catheterization with Possible Intervention;  Surgeon: Gaurang Swenson MD;  Location:  HEART CARDIAC CATH LAB     CV INSTANTANEOUS WAVE-FREE RATIO N/A 3/23/2020    Procedure: Instantaneous Wave-Free Ratio;  Surgeon: Gino Ferrer MD;  Location:  HEART CARDIAC CATH LAB     CV PCI ATHERECTOMY ORBITAL N/A 5/18/2020    Procedure: Percutaneous Coronary Intervention Atherectomy Rotational;  Surgeon: Gaurang Swenson MD;  Location:  HEART CARDIAC CATH LAB     CV PCI STENT DRUG ELUTING N/A 5/18/2020    Procedure: Percutaneous Coronary Intervention Stent Drug Eluting;  Surgeon: Gaurang Swenson MD;  Location:  HEART CARDIAC CATH LAB     CV RIGHT HEART CATH MEASUREMENTS RECORDED N/A 5/28/2020    Procedure: Right Heart Cath;  Surgeon: Larry Chawla MD;  Location:  HEART CARDIAC CATH LAB     CV TEMPORARY PACEMAKER INSERTION N/A 5/18/2020    Procedure: Temporary Pacemaker Insertion;  Surgeon: Gaurang Swenson MD;  Location:  HEART CARDIAC CATH LAB     ESOPHAGOSCOPY, GASTROSCOPY, DUODENOSCOPY (EGD), COMBINED N/A 9/8/2018    Procedure: COMBINED ESOPHAGOSCOPY, GASTROSCOPY, DUODENOSCOPY (EGD), BIOPSY SINGLE OR MULTIPLE;;  Surgeon: Xnader Marie MD;  Location:  GI     HEAD & NECK SURGERY       ORTHOPEDIC SURGERY      right arm ulna reset after injury     THORACOSCOPIC WEDGE RESECTION LUNG Right 8/2/2016    Procedure: THORACOSCOPIC WEDGE RESECTION LUNG;  Surgeon: Abdelrahman Noriega MD;  Location:  OR     [facility] NONSPECIFIC PROCEDURE  as a child    T & A. abstracted 7/3/02.     [facility] NONSPECIFIC PROCEDURE  early    CTR. abstracted 7/3/02.       FAMILY HISTORY:  Family History   Problem Relation Age of Onset     Blood Disease Mother         Anemia     Cardiovascular Father      Cancer - colorectal Maternal Grandfather       Hypertension Brother      Diabetes Sister 5        Constance Diabetes passed at 36     Respiratory Other         Lung Cancer       SOCIAL HISTORY:  Social History     Socioeconomic History     Marital status:      Spouse name: None     Number of children: None     Years of education: None     Highest education level: None   Occupational History     None   Social Needs     Financial resource strain: None     Food insecurity     Worry: None     Inability: None     Transportation needs     Medical: None     Non-medical: None   Tobacco Use     Smoking status: Former Smoker     Packs/day: 1.00     Years: 30.00     Pack years: 30.00     Types: Cigarettes     Quit date: 2013     Years since quittin.9     Smokeless tobacco: Never Used   Substance and Sexual Activity     Alcohol use: Yes     Alcohol/week: 42.0 standard drinks     Types: 42 Standard drinks or equivalent per week     Comment: occ     Drug use: No     Sexual activity: Not Currently   Lifestyle     Physical activity     Days per week: None     Minutes per session: None     Stress: None   Relationships     Social connections     Talks on phone: None     Gets together: None     Attends Worship service: None     Active member of club or organization: None     Attends meetings of clubs or organizations: None     Relationship status: None     Intimate partner violence     Fear of current or ex partner: None     Emotionally abused: None     Physically abused: None     Forced sexual activity: None   Other Topics Concern     Parent/sibling w/ CABG, MI or angioplasty before 65F 55M? Not Asked      Service Not Asked     Blood Transfusions Not Asked     Caffeine Concern Yes     Comment: 1 Coke occasionally      Occupational Exposure Not Asked     Hobby Hazards Not Asked     Sleep Concern Not Asked     Stress Concern Not Asked     Weight Concern Not Asked     Special Diet No     Back Care Not Asked     Exercise No     Bike Helmet Not Asked     Seat  Belt Not Asked     Self-Exams Not Asked   Social History Narrative    Retired (disability)

## 2021-06-22 ENCOUNTER — OFFICE VISIT (OUTPATIENT)
Dept: CARDIOLOGY | Facility: CLINIC | Age: 76
End: 2021-06-22
Attending: INTERNAL MEDICINE
Payer: MEDICARE

## 2021-06-22 VITALS
BODY MASS INDEX: 17.85 KG/M2 | HEART RATE: 79 BPM | HEIGHT: 68 IN | WEIGHT: 117.8 LBS | SYSTOLIC BLOOD PRESSURE: 106 MMHG | DIASTOLIC BLOOD PRESSURE: 70 MMHG

## 2021-06-22 DIAGNOSIS — I25.10 CORONARY ARTERY DISEASE INVOLVING NATIVE CORONARY ARTERY OF NATIVE HEART WITHOUT ANGINA PECTORIS: ICD-10-CM

## 2021-06-22 DIAGNOSIS — I48.19 PERSISTENT ATRIAL FIBRILLATION (H): ICD-10-CM

## 2021-06-22 DIAGNOSIS — I50.32 CHRONIC DIASTOLIC CONGESTIVE HEART FAILURE (H): ICD-10-CM

## 2021-06-22 DIAGNOSIS — I48.20 CHRONIC A-FIB (H): ICD-10-CM

## 2021-06-22 DIAGNOSIS — I35.0 AORTIC VALVE STENOSIS, ETIOLOGY OF CARDIAC VALVE DISEASE UNSPECIFIED: ICD-10-CM

## 2021-06-22 LAB
ANION GAP SERPL CALCULATED.3IONS-SCNC: 3 MMOL/L (ref 3–14)
BUN SERPL-MCNC: 13 MG/DL (ref 7–30)
CALCIUM SERPL-MCNC: 8.7 MG/DL (ref 8.5–10.1)
CHLORIDE SERPL-SCNC: 109 MMOL/L (ref 94–109)
CO2 SERPL-SCNC: 28 MMOL/L (ref 20–32)
CREAT SERPL-MCNC: 0.97 MG/DL (ref 0.66–1.25)
GFR SERPL CREATININE-BSD FRML MDRD: 74 ML/MIN/{1.73_M2}
GLUCOSE SERPL-MCNC: 85 MG/DL (ref 70–99)
POTASSIUM SERPL-SCNC: 4 MMOL/L (ref 3.4–5.3)
SODIUM SERPL-SCNC: 140 MMOL/L (ref 133–144)

## 2021-06-22 PROCEDURE — 99214 OFFICE O/P EST MOD 30 MIN: CPT | Performed by: PHYSICIAN ASSISTANT

## 2021-06-22 PROCEDURE — 36415 COLL VENOUS BLD VENIPUNCTURE: CPT | Performed by: INTERNAL MEDICINE

## 2021-06-22 PROCEDURE — 80048 BASIC METABOLIC PNL TOTAL CA: CPT | Performed by: INTERNAL MEDICINE

## 2021-06-22 RX ORDER — FUROSEMIDE 20 MG
20 TABLET ORAL DAILY
Qty: 30 TABLET | Refills: 0 | Status: SHIPPED | OUTPATIENT
Start: 2021-06-22 | End: 2021-06-23

## 2021-06-22 ASSESSMENT — MIFFLIN-ST. JEOR: SCORE: 1233.84

## 2021-06-22 NOTE — LETTER
6/22/2021    Danny Paige MD, MD  [address]  [place] [address]    RE: Efrem Ramos       Dear Colleague,    I had the pleasure of seeing Efrem JULIA Richard in the [facility].    [facility]    I had the pleasure of seeing Alfredo when he came for follow up of diuretic initiation.  This 75 year old sees Dr. Xiong and has seen Dr. Cortes for his history of:    1. Severe low flow, low gradient AoS - echo 6/3/2021 with SEBASTIAN 0.5cm2 with mean gradient 19mmHg  2. CAD - noted during TAVR w/u 5/2020. S/p rotational atherectomy and ISABEL x 4 to prox-distal RCA. Complicated by hemorrhagic shock from LFA bleeding. 4 units of blood transfused.  3. Permanent AFib - rate control with digoxin and lopressor. Eliquis for CHADSVASc 6 (HTN, CVA, CAD, age)  4. Mild-moderate MS, severe TR - on echo 6/3/2021  5. H/o L PCA CVA - 2013  6. MEL - on CPAP  7. HFpEF  8. PVD - has R SFA not amenable to PCI  9. Dyslipidemia   10. MGUS - sees Dr. Xavi Cortes saw Alfredo 6/7/2021 at which time they discussed his prolonged hospitalization following angiogram complicated by hemorrhagic shock back in 2020. They reviewed his echo showing severe TR, severe AoS (low-flow gradient with SEBASTIAN 0.5 cm2 with gradient of 19 mmHg). He was very frail, at 118#. Low dose diuretic was started in an effort to improve HIGH. 2 week follow-up was rec'd and I'm meeting him today.  Weight was recommended to be >130# with calorie supplements.    Interval History:  He actually doesn't think he picked up the Lasix and we called [facility] to confirm - indeed, no Rx for Lasix was sent in following Dr. Cortes's appt 6/7/2021.  Has not really had any change in breathing at all, which makes sense as he's not started diuretics.    He's working on increasing calorie intake, hoping to get at least to 130# per Dr. Cortes's recommendation.  He drinks Boost every morning.  Though we  "typically have our CAD pts follow a low saturated fat diet, we are looking for high calorie nutritious food for him.      VITALS:  Vitals: /70   Pulse 79   Ht 1.727 m (5' 8\")   Wt 53.4 kg (117 lb 12.8 oz)   BMI 17.91 kg/m      Diagnostic Testing:  Echocardiogram 6/3/2021 - EF 65-70%. Sev dilated RV, mod decreased RVSF. Severe MAC, 2+MR, mild-mod MS (3.7 mmHg @ 84 bpm). 3-4+TR. Sev AoS. Mean gradient 19mmHg with SEBASTIAN 0.59cm2  Angiogram 5/2020 - Patent RCA stents. No obstructive dz elsewhere. Mild pHTN. Mod-sev elevated RH filling pressures (mean RA 14 mmHg). Nl CO. SEBASTIAN 1.1 cm2, mean gradient 23 mmHg    Plan:  Lasix 20 mg daily  BMP/see me 2 weeks    Assessment/Plan:    1. Severe AoS, low flow    This has been followed over the last few years; he had a very prolonged recovery from his cath over 1 year ago 5/2020 resulting in 4 stents placed to RCA, complicated by LFA bleed and hemorrhagic shock    Dr. Cortes noted poor candidate for TAVR given extreme frailty. Calorie boosting rec'd. He's up 5# since 6/7 and drinking Boost in AM    PLAN:    Continue close follow-up       2. HFpEF    Has severe TR, mild MS (with AFib) and severe AoS in setting of nl EF on echo 6/3/2021    Lasix wasn't sent in to pharmacy 6/7 so he's not tried this. Not surprisingly, without change in HIGH      BMP stable    PLAN:    Start Lasix 20 mg daily    See me back 2-3 weeks with BMP        Odette Raymond PA-C, MSPAS      Orders Placed This Encounter   Procedures     Basic metabolic panel     Follow-Up with Cardiac Advanced Practice Provider     Orders Placed This Encounter   Medications     furosemide (LASIX) 20 MG tablet     Sig: Take 1 tablet (20 mg) by mouth daily     Dispense:  30 tablet     Refill:  0     Medications Discontinued During This Encounter   Medication Reason     furosemide (LASIX) tablet 20 mg Stopped by Patient     potassium chloride ER (K-TAB) 20 MEQ CR tablet Stopped by Patient     Cyanocobalamin 2000 MCG TABS  "         Encounter Diagnoses   Name Primary?     Aortic valve stenosis, etiology of cardiac valve disease unspecified      Coronary artery disease involving native coronary artery of native heart without angina pectoris      Persistent atrial fibrillation (H)      Chronic a-fib (H)      Chronic diastolic congestive heart failure (H)        CURRENT MEDICATIONS:  Current Outpatient Medications   Medication Sig Dispense Refill     acetaminophen (TYLENOL) 325 MG tablet Take 650 mg by mouth 3 times daily Plus 650 mg bid prn       apixaban ANTICOAGULANT (ELIQUIS) 5 MG tablet Take 1 tablet (5 mg) by mouth 2 times daily 180 tablet 3     atorvastatin (LIPITOR) 40 MG tablet Take 1 tablet (40 mg) by mouth daily 90 tablet 3     calcium carbonate 600 mg-vitamin D 400 units (CALTRATE) 600-400 MG-UNIT per tablet Take 1 tablet by mouth 2 times daily       clopidogrel (PLAVIX) 75 MG tablet Take 1 tablet (75 mg) by mouth daily 90 tablet 3     digoxin (LANOXIN) 125 MCG tablet Take 1 tablet (125 mcg) by mouth daily 90 tablet 3     furosemide (LASIX) 20 MG tablet Take 1 tablet (20 mg) by mouth daily 30 tablet 0     gabapentin (NEURONTIN) 300 MG capsule TAKE 2 CAPSULES(600 MG) BY MOUTH EVERY EVENING 180 capsule 3     melatonin 1 MG TABS tablet Take 1 mg by mouth nightly as needed for sleep       metoprolol tartrate (LOPRESSOR) 25 MG tablet Take 0.5 tablets (12.5 mg) by mouth 2 times daily Please call [phone] for an appointment. 90 tablet 0     mirtazapine (REMERON) 15 MG tablet TAKE 1 TABLET(15 MG) BY MOUTH AT BEDTIME 90 tablet 0     multivitamin (OCUVITE) TABS Take 1 tablet by mouth 2 times daily        polyethylene glycol (MIRALAX) 17 g packet Take 1 packet by mouth daily         ALLERGIES     Allergies   Allergen Reactions     Sulfa Drugs Difficulty breathing and Swelling     Adhesive Tape Blisters     Amiodarone Other (See Comments)     Developed pleural effusion     Penicillins Other (See Comments)     Reaction occurred as a  "child         Review of Systems:  Skin:  Negative rash   Eyes:  Positive for glasses  ENT:  Negative postnasal drainage;hearing loss  Respiratory:  Positive for dyspnea on exertion;sleep apnea;CPAP  Cardiovascular:  Negative for;palpitations;chest pain;edema Positive for;fatigue;lightheadedness;dizziness  Gastroenterology: Positive for diarrhea  Genitourinary:  Negative urgency  Musculoskeletal:  Positive for joint pain;arthritis;muscular weakness;back pain  Neurologic:  Positive for stroke;incoordination;memory problems;headaches  Psychiatric:  Positive for sleep disturbances;excessive stress;anxiety;depression  Heme/Lymph/Imm:  Positive for allergies  Endocrine:  Negative      Physical Exam:  Vitals: /70   Pulse 79   Ht 1.727 m (5' 8\")   Wt 53.4 kg (117 lb 12.8 oz)   BMI 17.91 kg/m      Constitutional:  cooperative, alert and oriented, well developed, well nourished, in no acute distress frail      Skin:  warm and dry to the touch, no apparent skin lesions or masses noted        Head:  normocephalic, no masses or lesions        Eyes:  pupils equal and round;conjunctivae and lids unremarkable;sclera white        ENT:  not assessed this visit        Neck:  JVP normal transmitted murmur      Chest:  clear to auscultation;normal symmetry;no tenderness to palpation;normal respiratory excursion;no intercostal retraction;no use of accessory muscles fine rales bilateral upper lobes    Cardiac: no S3 or S4 irregular rhythm     systolic murmur;late systolic murmur;grade 3 systolic murmur;RUSB   S1, S2 regular with 3/6 ESM LUSB    Abdomen:  BS normoactive;non-tender        Vascular:   pulses below the femoral arteries are diminished                               R radial artery without hematoma, bruising or tenderness    Extremities and Back:  no deformities, clubbing, cyanosis, erythema observed   up to shin    Neurological:  no gross motor deficits;affect appropriate ataxia;limb weakness          PAST MEDICAL " HISTORY:  Past Medical History:   Diagnosis Date     Atrial fibrillation (H)     amiodarone therapy discontinued due to pulmonary toxicity     Atrial flutter (H)      Benign essential hypertension 11/20/2018     Cancer (H) vocal cord     Carpal tunnel syndrome     abstracted 7/3/02.     Coronary artery disease involving native coronary artery of native heart without angina pectoris 5/8/2020    Cath 5/28/2020: patent stents; Cath 5/18/2020: ISABEL x4 to RCA; Cath 3/2020: 1 vessel disease; Cath 2017: moderate 2 vessel disease     CVA (cerebral infarction) 5/5/2015     Demyelinating disease of central nervous system, unspecified (H)     abstracted 7/3/02.     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     Pneumonia 8/17/2016     PVD (peripheral vascular disease) (H)      Redundant colon     needs CT colonography     Shingles      SKIN DISORDERS NEC 3/15/2005     Sleep apnea      SVT (supraventricular tachycardia) (H)        PAST SURGICAL HISTORY:  Past Surgical History:   Procedure Laterality Date     BIOPSY  brain 2002     BONE MARROW BIOPSY, BONE SPECIMEN, NEEDLE/TROCAR N/A 6/8/2017    Procedure: BIOPSY BONE MARROW;  UNILATERAL BONE MARROW BIOPSY (CONSCIOUS SEDATION) ;  Surgeon: Jamie Gonzales MD;  Location:  GI     CARDIAC CATHERIZATION  09/05/2017    2V CAD, IFR of RCA 0.95     CV CORONARY ANGIOGRAM N/A 3/23/2020    Procedure: Coronary  Angiogram and right heart cath;  Surgeon: Gino Ferrer MD;  Location:  HEART CARDIAC CATH LAB     CV HEART CATHETERIZATION WITH POSSIBLE INTERVENTION N/A 5/28/2020    Procedure: Heart Catheterization with Possible Intervention;  Surgeon: Larry Chawla MD;  Location:  HEART CARDIAC CATH LAB     CV HEART CATHETERIZATION WITH POSSIBLE INTERVENTION N/A 5/18/2020    Procedure: Heart Catheterization with Possible Intervention;  Surgeon: Gaurang Swenson MD;  Location:  HEART CARDIAC CATH LAB     CV INSTANTANEOUS WAVE-FREE RATIO N/A 3/23/2020    Procedure: Instantaneous Wave-Free Ratio;  Surgeon: Gino Ferrer MD;  Location:  HEART CARDIAC CATH LAB     CV PCI ATHERECTOMY ORBITAL N/A 5/18/2020    Procedure: Percutaneous Coronary Intervention Atherectomy Rotational;  Surgeon: Gaurang Swenson MD;  Location:  HEART CARDIAC CATH LAB     CV PCI STENT DRUG ELUTING N/A 5/18/2020    Procedure: Percutaneous Coronary Intervention Stent Drug Eluting;  Surgeon: Gaurang Swenson MD;  Location:  HEART CARDIAC CATH LAB     CV RIGHT HEART CATH MEASUREMENTS RECORDED N/A 5/28/2020    Procedure: Right Heart Cath;  Surgeon: Larry Chawla MD;  Location:  HEART CARDIAC CATH LAB     CV TEMPORARY PACEMAKER INSERTION N/A 5/18/2020    Procedure: Temporary Pacemaker Insertion;  Surgeon: Gaurang Swenson MD;  Location:  HEART CARDIAC CATH LAB     ESOPHAGOSCOPY, GASTROSCOPY, DUODENOSCOPY (EGD), COMBINED N/A 9/8/2018    Procedure: COMBINED ESOPHAGOSCOPY, GASTROSCOPY, DUODENOSCOPY (EGD), BIOPSY SINGLE OR MULTIPLE;;  Surgeon: Xander Marie MD;  Location:  GI     HEAD & NECK SURGERY       ORTHOPEDIC SURGERY      right arm ulna reset after injury     THORACOSCOPIC WEDGE RESECTION LUNG Right 8/2/2016    Procedure: THORACOSCOPIC WEDGE RESECTION LUNG;  Surgeon: Abdelrahman Noriega MD;  Location:  OR     [facility] NONSPECIFIC PROCEDURE  as a child    T & A. abstracted 7/3/02.      ZZC NONSPECIFIC PROCEDURE  early    CTR. abstracted 7/3/02.       FAMILY HISTORY:  Family History   Problem Relation Age of Onset     Blood Disease Mother         Anemia     Cardiovascular Father      Cancer - colorectal Maternal Grandfather      Hypertension Brother      Diabetes Sister 5        Juvinile Diabetes passed at 36     Respiratory Other         Lung Cancer       SOCIAL HISTORY:  Social History     Socioeconomic History     Marital status:      Spouse name: None     Number of children: None     Years of education: None     Highest education level: None   Occupational History     None   Social Needs     Financial resource strain: None     Food insecurity     Worry: None     Inability: None     Transportation needs     Medical: None     Non-medical: None   Tobacco Use     Smoking status: Former Smoker     Packs/day: 1.00     Years: 30.00     Pack years: 30.00     Types: Cigarettes     Quit date: 2013     Years since quittin.9     Smokeless tobacco: Never Used   Substance and Sexual Activity     Alcohol use: Yes     Alcohol/week: 42.0 standard drinks     Types: 42 Standard drinks or equivalent per week     Comment: occ     Drug use: No     Sexual activity: Not Currently   Lifestyle     Physical activity     Days per week: None     Minutes per session: None     Stress: None   Relationships     Social connections     Talks on phone: None     Gets together: None     Attends Mosque service: None     Active member of club or organization: None     Attends meetings of clubs or organizations: None     Relationship status: None     Intimate partner violence     Fear of current or ex partner: None     Emotionally abused: None     Physically abused: None     Forced sexual activity: None   Other Topics Concern     Parent/sibling w/ CABG, MI or angioplasty before 65F 55M? Not Asked      Service Not Asked     Blood Transfusions Not Asked     Caffeine Concern Yes     Comment: 1 José Manuel  occasionally      Occupational Exposure Not Asked     Hobby Hazards Not Asked     Sleep Concern Not Asked     Stress Concern Not Asked     Weight Concern Not Asked     Special Diet No     Back Care Not Asked     Exercise No     Bike Helmet Not Asked     Seat Belt Not Asked     Self-Exams Not Asked   Social History Narrative    Retired (disability)      Thank you for allowing me to participate in the care of your patient.      Sincerely,     Cherise Raymond PA-C     [facility]    cc:   Tariq Cortes MD  [address]  [place]  [place] [address]

## 2021-06-22 NOTE — PATIENT INSTRUCTIONS
Alfredo - it was nice to see you today!    1. Reviewed that we don't think you're taking the water pill (furosemide) 20 mg daily - we verified with the pharmacy that they didn't get Rx (our fault!).  I've now sent this in  2. Keep eating as many calories as possible!    PLAN:  1. Start furosemide 20 mg (Lasix) one daily in morning  2. See me back in 2-3 weeks with non-fasting blood work    [phone]

## 2021-06-23 DIAGNOSIS — I50.32 CHRONIC DIASTOLIC CONGESTIVE HEART FAILURE (H): ICD-10-CM

## 2021-06-23 DIAGNOSIS — I35.0 AORTIC VALVE STENOSIS, ETIOLOGY OF CARDIAC VALVE DISEASE UNSPECIFIED: ICD-10-CM

## 2021-06-23 DIAGNOSIS — I25.10 CORONARY ARTERY DISEASE INVOLVING NATIVE CORONARY ARTERY OF NATIVE HEART WITHOUT ANGINA PECTORIS: ICD-10-CM

## 2021-06-23 RX ORDER — FUROSEMIDE 20 MG
20 TABLET ORAL DAILY
Qty: 90 TABLET | Refills: 0 | Status: SHIPPED | OUTPATIENT
Start: 2021-06-23 | End: 2021-07-27

## 2021-06-24 ENCOUNTER — DOCUMENTATION ONLY (OUTPATIENT)
Dept: CARDIOLOGY | Facility: CLINIC | Age: 76
End: 2021-06-24

## 2021-06-24 DIAGNOSIS — I10 HTN (HYPERTENSION): Primary | ICD-10-CM

## 2021-06-24 RX ORDER — POTASSIUM CHLORIDE 750 MG/1
10 TABLET, EXTENDED RELEASE ORAL DAILY
Qty: 90 TABLET | Refills: 0 | Status: SHIPPED | OUTPATIENT
Start: 2021-06-24 | End: 2021-10-27

## 2021-06-24 NOTE — PROGRESS NOTES
Interesting ...   Pls put him back on KCl but on just 10 mEq daily. Has a BMP scheduled 7/12. Thx! Odette

## 2021-06-24 NOTE — PROGRESS NOTES
I see that lasix was just refilled, we received a request to refill k+ chloride 20meq daily-I do not see It on the EMR-is he to take this??  Message to Odette marti who saw pt 6-    [facility]      [facility] [place] [phone]

## 2021-07-08 NOTE — TELEPHONE ENCOUNTER
Connected with wife again and left message to schedule harvinder't with PCP. Stated that she needed to speak with her  and her son () and will call back to schedule.    Awaiting callback still.    Rx refilled

## 2021-07-18 NOTE — PROGRESS NOTES
"[facility] HEART CLINIC    I had the pleasure of seeing Alfredo when he came for follow up of fluid overload.  This 76 year old sees Dr. Cortes and Dr. Xiong for his history of:    1. Severe low flow, low gradient AoS - echo 6/3/2021 with SEBASTIAN 0.5cm2 with mean gradient 19mmHg  2. CAD - noted during TAVR w/u 5/2020. S/p rotational atherectomy and ISABEL x 4 to prox-distal RCA. Complicated by hemorrhagic shock from LFA bleeding. 4 units of blood transfused.  3. Permanent AFib - rate control with digoxin and lopressor. Eliquis for CHADSVASc 6 (HTN, CVA, CAD, age)  4. Mild-moderate MS, severe TR - on echo 6/3/2021  5. H/o L PCA CVA - 2013  6. MEL - on CPAP  7. HFpEF  8. PVD - has R SFA not amenable to PCI  9. Dyslipidemia   10. MGUS - sees Dr. Hurley     I saw Alfredo 6/22 at which time we reviewed that he had not started furosemide after Dr. Cortes's appointment 6/7.  He continued to complain of shortness of breath, which was not surprising given that he had not started diuretic therapy for his severe TR and severe aortic stenosis (low-low gradient with SEBASTIAN 0.5 cm  with gradient of 19 mmHg).  He had continue to work on increasing dietary intake, hoping to get to 130#.    I asked him to start Lasix 20 mg daily and KCl 10 mEq daily. Close follow-up with BMP rec'd.  Of note, at that appointment weight was 117#. Unfortunately, follow-up was delayed d/t my illness and I'm seeing him back >1 m later.    Interval History:  He and his son Alfredo think he might be a little better on he Lasix 20 mg daily and KCl 10 mEq daily from a breathing perspective, but note no increased energy.  His BP is low on this medication but BMP is fine today.    His son Alfredo is concerned that he continues to lose stamina/muscle mass b/c he \"doesn't do anything.\" He stays upstairs in bed most of the time and \"dreads\" doctors' appts b/c he has to come downstairs and then go back up. His son brings him meals each day, which he eats upstairs.    At our last " "visit I asked him to liberalized his diet b/c of the weight gain goal. Alfredo notes his teeth are \"really bad\" which has limited his chewing and intake but is going to dentist on Thursday, where he anticipates having some teeth pulled.  He's gained ~5# since our last visit ~5 weeks ago.    No CP. No orthopnea/PND. As above, breathing might be a little better on Lasix 20 mg daily.    VITALS:  Vitals: BP (!) 89/62 (BP Location: Left arm, Patient Position: Sitting, Cuff Size: Adult Regular)   Pulse 94   Ht 1.727 m (5' 7.99\")   Wt 55.3 kg (122 lb)   BMI 18.55 kg/m      Diagnostic Testing:  Echocardiogram 6/3/2021 - EF 65-70%. Sev dilated RV, mod decreased RVSF. Severe MAC, 2+MR, mild-mod MS (3.7 mmHg @ 84 bpm). 3-4+TR. Sev AoS. Mean gradient 19mmHg with SEBASTIAN 0.59cm2  Angiogram 5/2020 - Patent RCA stents. No obstructive dz elsewhere. Mild pHTN. Mod-sev elevated RH filling pressures (mean RA 14 mmHg). Nl CO. SEBASTIAN 1.1 cm2, mean gradient 23 mmHg  Component      Latest Ref Rng & Units 2/16/2021 6/22/2021 7/27/2021   Sodium      133 - 144 mmol/L 138 140 139   Potassium      3.4 - 5.3 mmol/L 3.8 4.0 3.7   Chloride      94 - 109 mmol/L 108 109 106   Carbon Dioxide      20 - 32 mmol/L 26 28 29   Anion Gap      3 - 14 mmol/L 4 3 4   Glucose      70 - 99 mg/dL 92 85 90   Urea Nitrogen      7 - 30 mg/dL 14 13 22   Creatinine      0.66 - 1.25 mg/dL 0.92 0.97 0.97   GFR Estimate      >60 mL/min/1.73m2 80 74 74   GFR Estimate If Black      >60 mL/min/1.73:m2 >90 86    Calcium      8.5 - 10.1 mg/dL 8.9 8.7 8.7       Plan:  Decrease Lasix from 20 to 10 mg daily  Continue KCl 10 mEq daily  Increase exercise     Assessment/Plan:    1. Severe AoS, low flow    This is been followed over the last few years; he had a very prolonged recovery from his angiogram over 1 year ago 5/2020.  This is resulted in 4 stents placed to the RCA, complicated by LFA bleed and hemorrhagic shock. Today he tells me CP did improve after stents placed, which we've " not heard from him before    Dr. Cortes had noted he be a poor candidate for TAVR given his extreme frailty when he saw him ~2 m ago.  He recommended boosting his calories    PLAN:    I suggested Palliative Care Consultation. They will think about htis    Reduce Lasix to 10 mg daily and continue KCl 10 mEq daily as breathing somewhat improved but BP low    Increase exercise to build muscle mass. See AVS    Hoping that dental work may improve his ability to eat.    2. HFpEF    Has severe TR, mild MS (with AFib) and severe AoS in setting of nl EF on echo 6/3/2021    He started Lasix 20 mg daily on 6/23 and KCl 10 mEq.    BMP done in follow-up today looks good    Breathing somewhat better    PLAN:    Decrease Lasix from 20 to 10 mg daily. Continue KCl 10 mEq daily    1 m follow-up with me    Odette Raymond PA-C, MSPAS      Orders Placed This Encounter   Procedures     Follow-Up with Cardiac Advanced Practice Provider     Orders Placed This Encounter   Medications     furosemide (LASIX) 20 MG tablet     Sig: Take 0.5 tablets (10 mg) by mouth daily     Medications Discontinued During This Encounter   Medication Reason     furosemide (LASIX) 20 MG tablet          Encounter Diagnoses   Name Primary?     Chronic diastolic congestive heart failure (H)      Aortic valve stenosis, etiology of cardiac valve disease unspecified      Coronary artery disease involving native coronary artery of native heart without angina pectoris        CURRENT MEDICATIONS:  Current Outpatient Medications   Medication Sig Dispense Refill     acetaminophen (TYLENOL) 325 MG tablet Take 650 mg by mouth 3 times daily Plus 650 mg bid prn       apixaban ANTICOAGULANT (ELIQUIS) 5 MG tablet Take 1 tablet (5 mg) by mouth 2 times daily 180 tablet 3     atorvastatin (LIPITOR) 40 MG tablet Take 1 tablet (40 mg) by mouth daily 90 tablet 3     calcium carbonate 600 mg-vitamin D 400 units (CALTRATE) 600-400 MG-UNIT per tablet Take 1 tablet by mouth 2 times daily        clopidogrel (PLAVIX) 75 MG tablet Take 1 tablet (75 mg) by mouth daily 90 tablet 3     digoxin (LANOXIN) 125 MCG tablet Take 1 tablet (125 mcg) by mouth daily 90 tablet 3     furosemide (LASIX) 20 MG tablet Take 0.5 tablets (10 mg) by mouth daily       gabapentin (NEURONTIN) 300 MG capsule TAKE 2 CAPSULES(600 MG) BY MOUTH EVERY EVENING 180 capsule 3     melatonin 1 MG TABS tablet Take 1 mg by mouth nightly as needed for sleep       metoprolol succinate ER (TOPROL XL) 25 MG 24 hr tablet Take 1 tablet (25 mg) by mouth daily 90 tablet 3     mirtazapine (REMERON) 15 MG tablet TAKE 1 TABLET(15 MG) BY MOUTH AT BEDTIME 90 tablet 0     multivitamin (OCUVITE) TABS Take 1 tablet by mouth 2 times daily        polyethylene glycol (MIRALAX) 17 g packet Take 1 packet by mouth daily       potassium chloride ER (K-TAB/KLOR-CON) 10 MEQ CR tablet Take 1 tablet (10 mEq) by mouth daily 90 tablet 0       ALLERGIES     Allergies   Allergen Reactions     Sulfa Drugs Difficulty breathing and Swelling     Adhesive Tape Blisters     Amiodarone Other (See Comments)     Developed pleural effusion     Penicillins Other (See Comments)     Reaction occurred as a child         Review of Systems:  Skin:  Negative rash   Eyes:  Positive for glasses  ENT:  Negative postnasal drainage;hearing loss  Respiratory:  Positive for dyspnea on exertion;sleep apnea;CPAP  Cardiovascular:  Negative for;palpitations;chest pain;edema Positive for;fatigue;lightheadedness;dizziness  Gastroenterology: Positive for diarrhea  Genitourinary:  Negative urgency  Musculoskeletal:  Positive for joint pain;arthritis;muscular weakness;back pain  Neurologic:  Positive for stroke;incoordination;memory problems;headaches  Psychiatric:  Positive for sleep disturbances;excessive stress;anxiety;depression  Heme/Lymph/Imm:  Positive for allergies  Endocrine:  Negative      Physical Exam:  Vitals: BP (!) 89/62 (BP Location: Left arm, Patient Position: Sitting, Cuff Size: Adult  "Regular)   Pulse 94   Ht 1.727 m (5' 7.99\")   Wt 55.3 kg (122 lb)   BMI 18.55 kg/m      Constitutional:  cooperative, alert and oriented, well developed, well nourished, in no acute distress frail      Skin:  warm and dry to the touch, no apparent skin lesions or masses noted        Head:  normocephalic, no masses or lesions        Eyes:  pupils equal and round;conjunctivae and lids unremarkable;sclera white        ENT:  not assessed this visit        Neck:  JVP normal transmitted murmur      Chest:  clear to auscultation;normal symmetry;no tenderness to palpation;normal respiratory excursion;no intercostal retraction;no use of accessory muscles        Cardiac: no S3 or S4 irregular rhythm     systolic murmur;late systolic murmur;grade 3 systolic murmur;RUSB   S1, S2 regular with 3/6 ESM LUSB    Abdomen:  BS normoactive;non-tender        Vascular:   pulses below the femoral arteries are diminished                                    Extremities and Back:  no deformities, clubbing, cyanosis, erythema observed   up to shin    Neurological:  no gross motor deficits;affect appropriate ataxia;limb weakness          PAST MEDICAL HISTORY:  Past Medical History:   Diagnosis Date     Atrial fibrillation (H)     amiodarone therapy discontinued due to pulmonary toxicity     Atrial flutter (H)      Benign essential hypertension 11/20/2018     Cancer (H) vocal cord     Carpal tunnel syndrome     abstracted 7/3/02.     Coronary artery disease involving native coronary artery of native heart without angina pectoris 5/8/2020    Cath 5/28/2020: patent stents; Cath 5/18/2020: ISABEL x4 to RCA; Cath 3/2020: 1 vessel disease; Cath 2017: moderate 2 vessel disease     CVA (cerebral infarction) 5/5/2015     Demyelinating disease of central nervous system, unspecified (H)     abstracted 7/3/02.     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     Pneumonia 8/17/2016     PVD (peripheral vascular disease) (H)      Redundant colon     needs CT colonography     Shingles      SKIN DISORDERS NEC 3/15/2005     Sleep apnea      SVT (supraventricular tachycardia) (H)        PAST SURGICAL HISTORY:  Past Surgical History:   Procedure Laterality Date     BIOPSY  brain 2002     BONE MARROW BIOPSY, BONE SPECIMEN, NEEDLE/TROCAR N/A 6/8/2017    Procedure: BIOPSY BONE MARROW;  UNILATERAL BONE MARROW BIOPSY (CONSCIOUS SEDATION) ;  Surgeon: Jamie Gonzales MD;  Location:  GI     CARDIAC CATHERIZATION  09/05/2017    2V CAD, IFR of RCA 0.95     CV CORONARY ANGIOGRAM N/A 3/23/2020    Procedure: Coronary Angiogram and right heart cath;  Surgeon: Gino Ferrer MD;  Location:  HEART CARDIAC CATH LAB     CV HEART CATHETERIZATION WITH POSSIBLE INTERVENTION N/A 5/28/2020    Procedure: Heart Catheterization with Possible Intervention;  Surgeon: Larry Chawla MD;  Location:  HEART CARDIAC CATH LAB     CV HEART CATHETERIZATION WITH POSSIBLE INTERVENTION N/A 5/18/2020    Procedure: Heart Catheterization with Possible Intervention;  Surgeon: Gaurang Swenson MD;  Location:  HEART CARDIAC CATH LAB     CV INSTANTANEOUS WAVE-FREE RATIO N/A 3/23/2020    Procedure: Instantaneous Wave-Free Ratio;  Surgeon: Gino Ferrer MD;  Location:  HEART CARDIAC CATH LAB     CV PCI ATHERECTOMY ORBITAL N/A 5/18/2020    Procedure: Percutaneous Coronary Intervention Atherectomy  Rotational;  Surgeon: Gaurang Swenson MD;  Location:  HEART CARDIAC CATH LAB     CV PCI STENT DRUG ELUTING N/A 5/18/2020    Procedure: Percutaneous Coronary Intervention Stent Drug Eluting;  Surgeon: Gaurang Swenson MD;  Location:  HEART CARDIAC CATH LAB     CV RIGHT HEART CATH MEASUREMENTS RECORDED N/A 5/28/2020    Procedure: Right Heart Cath;  Surgeon: Larry Chawla MD;  Location:  HEART CARDIAC CATH LAB     CV TEMPORARY PACEMAKER INSERTION N/A 5/18/2020    Procedure: Temporary Pacemaker Insertion;  Surgeon: Gaurang Swenson MD;  Location:  HEART CARDIAC CATH LAB     ESOPHAGOSCOPY, GASTROSCOPY, DUODENOSCOPY (EGD), COMBINED N/A 9/8/2018    Procedure: COMBINED ESOPHAGOSCOPY, GASTROSCOPY, DUODENOSCOPY (EGD), BIOPSY SINGLE OR MULTIPLE;;  Surgeon: Xander Marie MD;  Location:  GI     HEAD & NECK SURGERY       ORTHOPEDIC SURGERY      right arm ulna reset after injury     THORACOSCOPIC WEDGE RESECTION LUNG Right 8/2/2016    Procedure: THORACOSCOPIC WEDGE RESECTION LUNG;  Surgeon: Abdelrahman Noriega MD;  Location:  OR     [facility] NONSPECIFIC PROCEDURE  as a child    T & A. abstracted 7/3/02.     ZZC NONSPECIFIC PROCEDURE  early    CTR. abstracted 7/3/02.       FAMILY HISTORY:  Family History   Problem Relation Age of Onset     Blood Disease Mother         Anemia     Cardiovascular Father      Cancer - colorectal Maternal Grandfather      Hypertension Brother      Diabetes Sister 5        Juvinile Diabetes passed at 36     Respiratory Other         Lung Cancer       SOCIAL HISTORY:  Social History     Socioeconomic History     Marital status:      Spouse name: None     Number of children: None     Years of education: None     Highest education level: None   Occupational History     None   Tobacco Use     Smoking status: Former Smoker     Packs/day: 1.00     Years: 30.00     Pack years: 30.00     Types: Cigarettes     Quit date: 7/26/2013     Years since quitting:  8.0     Smokeless tobacco: Never Used   Substance and Sexual Activity     Alcohol use: Yes     Alcohol/week: 42.0 standard drinks     Types: 42 Standard drinks or equivalent per week     Comment: occ     Drug use: No     Sexual activity: Not Currently   Other Topics Concern     Parent/sibling w/ CABG, MI or angioplasty before 65F 55M? Not Asked      Service Not Asked     Blood Transfusions Not Asked     Caffeine Concern Yes     Comment: 1 Coke occasionally      Occupational Exposure Not Asked     Hobby Hazards Not Asked     Sleep Concern Not Asked     Stress Concern Not Asked     Weight Concern Not Asked     Special Diet No     Back Care Not Asked     Exercise No     Bike Helmet Not Asked     Seat Belt Not Asked     Self-Exams Not Asked   Social History Narrative    Retired (disability)      Social Determinants of Health     Financial Resource Strain:      Difficulty of Paying Living Expenses:    Food Insecurity:      Worried About Running Out of Food in the Last Year:      Ran Out of Food in the Last Year:    Transportation Needs:      Lack of Transportation (Medical):      Lack of Transportation (Non-Medical):    Physical Activity:      Days of Exercise per Week:      Minutes of Exercise per Session:    Stress:      Feeling of Stress :    Social Connections:      Frequency of Communication with Friends and Family:      Frequency of Social Gatherings with Friends and Family:      Attends Jew Services:      Active Member of Clubs or Organizations:      Attends Club or Organization Meetings:      Marital Status:    Intimate Partner Violence:      Fear of Current or Ex-Partner:      Emotionally Abused:      Physically Abused:      Sexually Abused:

## 2021-07-21 ENCOUNTER — TELEPHONE (OUTPATIENT)
Dept: CARDIOLOGY | Facility: CLINIC | Age: 76
End: 2021-07-21

## 2021-07-21 DIAGNOSIS — I48.0 PAROXYSMAL ATRIAL FIBRILLATION (H): Primary | ICD-10-CM

## 2021-07-21 NOTE — TELEPHONE ENCOUNTER
Pt wife called and is questioning pt Metoprolol. Pt wife states that she has taken over refilling pt med box.  Pt Has both Metoprolol XL 25 mg daily, but wife has been giving 12.5 mg twice daily. The other bottle is Metoprolol Tartate 12.5 mg twice daily.  Wife states that pt has just started filling pill box and hs been giving pt Xl which was stopped in June of 2020.  Pt was able to get a refill of the XL in January of this year.  Pharmacy now has only tartrate.  Pt wife wonders what should patient be taking?  She is unaware of his BP.  Pt has OV on Monday with Martine, but needs to switch the tartrate as the XL will be out.  Will message martine as to the switch and then make wife know. Rosa Maria

## 2021-07-22 NOTE — TELEPHONE ENCOUNTER
Would send Rx for what he's actually been taking (metoprolol XL 12.5 mg BID) as doesn't appear it's causing any issues.    Pls update Epic med list and send Rx    Odette

## 2021-07-23 RX ORDER — METOPROLOL SUCCINATE 25 MG/1
25 TABLET, EXTENDED RELEASE ORAL DAILY
Qty: 90 TABLET | Refills: 3 | Status: SHIPPED | OUTPATIENT
Start: 2021-07-23 | End: 2021-10-26

## 2021-07-23 NOTE — TELEPHONE ENCOUNTER
7/23/21 Spoke w wife and explained recommendations per Odette Raymond PA  Would send Rx for what he's actually been taking (metoprolol XL 12.5 mg BID) as doesn't appear it's causing any issues.  Sent refill to [facility] per wife's request. Med list updated.  KHAlejo 1115 am

## 2021-07-27 ENCOUNTER — LAB (OUTPATIENT)
Dept: LAB | Facility: CLINIC | Age: 76
End: 2021-07-27
Payer: MEDICARE

## 2021-07-27 ENCOUNTER — OFFICE VISIT (OUTPATIENT)
Dept: CARDIOLOGY | Facility: CLINIC | Age: 76
End: 2021-07-27
Payer: MEDICARE

## 2021-07-27 VITALS
BODY MASS INDEX: 18.49 KG/M2 | WEIGHT: 122 LBS | SYSTOLIC BLOOD PRESSURE: 89 MMHG | HEIGHT: 68 IN | DIASTOLIC BLOOD PRESSURE: 62 MMHG | HEART RATE: 94 BPM

## 2021-07-27 DIAGNOSIS — I50.32 CHRONIC DIASTOLIC CONGESTIVE HEART FAILURE (H): ICD-10-CM

## 2021-07-27 DIAGNOSIS — I35.0 AORTIC VALVE STENOSIS, ETIOLOGY OF CARDIAC VALVE DISEASE UNSPECIFIED: ICD-10-CM

## 2021-07-27 DIAGNOSIS — I25.10 CORONARY ARTERY DISEASE INVOLVING NATIVE CORONARY ARTERY OF NATIVE HEART WITHOUT ANGINA PECTORIS: ICD-10-CM

## 2021-07-27 LAB
ANION GAP SERPL CALCULATED.3IONS-SCNC: 4 MMOL/L (ref 3–14)
BUN SERPL-MCNC: 22 MG/DL (ref 7–30)
CALCIUM SERPL-MCNC: 8.7 MG/DL (ref 8.5–10.1)
CHLORIDE BLD-SCNC: 106 MMOL/L (ref 94–109)
CO2 SERPL-SCNC: 29 MMOL/L (ref 20–32)
CREAT SERPL-MCNC: 0.97 MG/DL (ref 0.66–1.25)
GFR SERPL CREATININE-BSD FRML MDRD: 74 ML/MIN/1.73M2
GLUCOSE BLD-MCNC: 90 MG/DL (ref 70–99)
POTASSIUM BLD-SCNC: 3.7 MMOL/L (ref 3.4–5.3)
SODIUM SERPL-SCNC: 139 MMOL/L (ref 133–144)

## 2021-07-27 PROCEDURE — 99214 OFFICE O/P EST MOD 30 MIN: CPT | Performed by: PHYSICIAN ASSISTANT

## 2021-07-27 PROCEDURE — 80048 BASIC METABOLIC PNL TOTAL CA: CPT | Performed by: PHYSICIAN ASSISTANT

## 2021-07-27 PROCEDURE — 36415 COLL VENOUS BLD VENIPUNCTURE: CPT | Performed by: PHYSICIAN ASSISTANT

## 2021-07-27 RX ORDER — FUROSEMIDE 20 MG
10 TABLET ORAL DAILY
Start: 2021-07-27 | End: 2021-08-30

## 2021-07-27 ASSESSMENT — MIFFLIN-ST. JEOR: SCORE: 1247.76

## 2021-07-27 NOTE — PATIENT INSTRUCTIONS
Alfredo - it was nice to see you and Alfredo today!    1. Reviewed that diuretic/water pill furosemide 20 mg daily maybe improved breathing slightly but no extra energy or anything.   2. Blood work today looked great    PLAN:  1. INCREASE exercise!  Start with peddling the bike ONLY during commercials during 2 30 minutes shows (1 in AM and 1 in PM)  2. INCREASE protein in diet as able  3. Go to dentist as planned  4. DECREASE furosemide/Lasix to just 10 mg daily in AM (cut 20 mg tabs in 1/2) and continue potassium

## 2021-07-27 NOTE — LETTER
7/27/2021    Danny Paige MD, MD  [address]  [place] [address]    RE: Efrem Ramos       Dear Colleague,    I had the pleasure of seeing Efrem DUMONT Richard in the [facility].    [facility]    I had the pleasure of seeing Alfredo when he came for follow up of fluid overload.  This 76 year old sees Dr. Cortes and Dr. Xiong for his history of:    1. Severe low flow, low gradient AoS - echo 6/3/2021 with SEBASTIAN 0.5cm2 with mean gradient 19mmHg  2. CAD - noted during TAVR w/u 5/2020. S/p rotational atherectomy and ISABEL x 4 to prox-distal RCA. Complicated by hemorrhagic shock from LFA bleeding. 4 units of blood transfused.  3. Permanent AFib - rate control with digoxin and lopressor. Eliquis for CHADSVASc 6 (HTN, CVA, CAD, age)  4. Mild-moderate MS, severe TR - on echo 6/3/2021  5. H/o L PCA CVA - 2013  6. MEL - on CPAP  7. HFpEF  8. PVD - has R SFA not amenable to PCI  9. Dyslipidemia   10. MGUS - sees Dr. Hurley     I saw Alfredo 6/22 at which time we reviewed that he had not started furosemide after Dr. Cortes's appointment 6/7.  He continued to complain of shortness of breath, which was not surprising given that he had not started diuretic therapy for his severe TR and severe aortic stenosis (low-low gradient with SEBASTIAN 0.5 cm  with gradient of 19 mmHg).  He had continue to work on increasing dietary intake, hoping to get to 130#.    I asked him to start Lasix 20 mg daily and KCl 10 mEq daily. Close follow-up with BMP rec'd.  Of note, at that appointment weight was 117#. Unfortunately, follow-up was delayed d/t my illness and I'm seeing him back >1 m later.    Interval History:  He and his son Alfredo think he might be a little better on he Lasix 20 mg daily and KCl 10 mEq daily from a breathing perspective, but note no increased energy.  His BP is low on this medication but BMP is fine today.    His son Alfredo is concerned that he  "continues to lose stamina/muscle mass b/c he \"doesn't do anything.\" He stays upstairs in bed most of the time and \"dreads\" doctors' appts b/c he has to come downstairs and then go back up. His son brings him meals each day, which he eats upstairs.    At our last visit I asked him to liberalized his diet b/c of the weight gain goal. Alfredo notes his teeth are \"really bad\" which has limited his chewing and intake but is going to dentist on Thursday, where he anticipates having some teeth pulled.  He's gained ~5# since our last visit ~5 weeks ago.    No CP. No orthopnea/PND. As above, breathing might be a little better on Lasix 20 mg daily.    VITALS:  Vitals: BP (!) 89/62 (BP Location: Left arm, Patient Position: Sitting, Cuff Size: Adult Regular)   Pulse 94   Ht 1.727 m (5' 7.99\")   Wt 55.3 kg (122 lb)   BMI 18.55 kg/m      Diagnostic Testing:  Echocardiogram 6/3/2021 - EF 65-70%. Sev dilated RV, mod decreased RVSF. Severe MAC, 2+MR, mild-mod MS (3.7 mmHg @ 84 bpm). 3-4+TR. Sev AoS. Mean gradient 19mmHg with SEBASTIAN 0.59cm2  Angiogram 5/2020 - Patent RCA stents. No obstructive dz elsewhere. Mild pHTN. Mod-sev elevated RH filling pressures (mean RA 14 mmHg). Nl CO. SEBASTIAN 1.1 cm2, mean gradient 23 mmHg  Component      Latest Ref Rng & Units 2/16/2021 6/22/2021 7/27/2021   Sodium      133 - 144 mmol/L 138 140 139   Potassium      3.4 - 5.3 mmol/L 3.8 4.0 3.7   Chloride      94 - 109 mmol/L 108 109 106   Carbon Dioxide      20 - 32 mmol/L 26 28 29   Anion Gap      3 - 14 mmol/L 4 3 4   Glucose      70 - 99 mg/dL 92 85 90   Urea Nitrogen      7 - 30 mg/dL 14 13 22   Creatinine      0.66 - 1.25 mg/dL 0.92 0.97 0.97   GFR Estimate      >60 mL/min/1.73m2 80 74 74   GFR Estimate If Black      >60 mL/min/1.73:m2 >90 86    Calcium      8.5 - 10.1 mg/dL 8.9 8.7 8.7       Plan:  Decrease Lasix from 20 to 10 mg daily  Continue KCl 10 mEq daily  Increase exercise     Assessment/Plan:    1. Severe AoS, low flow    This is been followed " over the last few years; he had a very prolonged recovery from his angiogram over 1 year ago 5/2020.  This is resulted in 4 stents placed to the RCA, complicated by LFA bleed and hemorrhagic shock. Today he tells me CP did improve after stents placed, which we've not heard from him before    Dr. Cortes had noted he be a poor candidate for TAVR given his extreme frailty when he saw him ~2 m ago.  He recommended boosting his calories    PLAN:    I suggested Palliative Care Consultation. They will think about htis    Reduce Lasix to 10 mg daily and continue KCl 10 mEq daily as breathing somewhat improved but BP low    Increase exercise to build muscle mass. See AVS    Hoping that dental work may improve his ability to eat.    2. HFpEF    Has severe TR, mild MS (with AFib) and severe AoS in setting of nl EF on echo 6/3/2021    He started Lasix 20 mg daily on 6/23 and KCl 10 mEq.    BMP done in follow-up today looks good    Breathing somewhat better    PLAN:    Decrease Lasix from 20 to 10 mg daily. Continue KCl 10 mEq daily    1 m follow-up with me    Odette Raymond PA-C, MSPAS      Orders Placed This Encounter   Procedures     Follow-Up with Cardiac Advanced Practice Provider     Orders Placed This Encounter   Medications     furosemide (LASIX) 20 MG tablet     Sig: Take 0.5 tablets (10 mg) by mouth daily     Medications Discontinued During This Encounter   Medication Reason     furosemide (LASIX) 20 MG tablet          Encounter Diagnoses   Name Primary?     Chronic diastolic congestive heart failure (H)      Aortic valve stenosis, etiology of cardiac valve disease unspecified      Coronary artery disease involving native coronary artery of native heart without angina pectoris        CURRENT MEDICATIONS:  Current Outpatient Medications   Medication Sig Dispense Refill     acetaminophen (TYLENOL) 325 MG tablet Take 650 mg by mouth 3 times daily Plus 650 mg bid prn       apixaban ANTICOAGULANT (ELIQUIS) 5 MG tablet Take 1  tablet (5 mg) by mouth 2 times daily 180 tablet 3     atorvastatin (LIPITOR) 40 MG tablet Take 1 tablet (40 mg) by mouth daily 90 tablet 3     calcium carbonate 600 mg-vitamin D 400 units (CALTRATE) 600-400 MG-UNIT per tablet Take 1 tablet by mouth 2 times daily       clopidogrel (PLAVIX) 75 MG tablet Take 1 tablet (75 mg) by mouth daily 90 tablet 3     digoxin (LANOXIN) 125 MCG tablet Take 1 tablet (125 mcg) by mouth daily 90 tablet 3     furosemide (LASIX) 20 MG tablet Take 0.5 tablets (10 mg) by mouth daily       gabapentin (NEURONTIN) 300 MG capsule TAKE 2 CAPSULES(600 MG) BY MOUTH EVERY EVENING 180 capsule 3     melatonin 1 MG TABS tablet Take 1 mg by mouth nightly as needed for sleep       metoprolol succinate ER (TOPROL XL) 25 MG 24 hr tablet Take 1 tablet (25 mg) by mouth daily 90 tablet 3     mirtazapine (REMERON) 15 MG tablet TAKE 1 TABLET(15 MG) BY MOUTH AT BEDTIME 90 tablet 0     multivitamin (OCUVITE) TABS Take 1 tablet by mouth 2 times daily        polyethylene glycol (MIRALAX) 17 g packet Take 1 packet by mouth daily       potassium chloride ER (K-TAB/KLOR-CON) 10 MEQ CR tablet Take 1 tablet (10 mEq) by mouth daily 90 tablet 0       ALLERGIES     Allergies   Allergen Reactions     Sulfa Drugs Difficulty breathing and Swelling     Adhesive Tape Blisters     Amiodarone Other (See Comments)     Developed pleural effusion     Penicillins Other (See Comments)     Reaction occurred as a child         Review of Systems:  Skin:  Negative rash   Eyes:  Positive for glasses  ENT:  Negative postnasal drainage;hearing loss  Respiratory:  Positive for dyspnea on exertion;sleep apnea;CPAP  Cardiovascular:  Negative for;palpitations;chest pain;edema Positive for;fatigue;lightheadedness;dizziness  Gastroenterology: Positive for diarrhea  Genitourinary:  Negative urgency  Musculoskeletal:  Positive for joint pain;arthritis;muscular weakness;back pain  Neurologic:  Positive for stroke;incoordination;memory  "problems;headaches  Psychiatric:  Positive for sleep disturbances;excessive stress;anxiety;depression  Heme/Lymph/Imm:  Positive for allergies  Endocrine:  Negative      Physical Exam:  Vitals: BP (!) 89/62 (BP Location: Left arm, Patient Position: Sitting, Cuff Size: Adult Regular)   Pulse 94   Ht 1.727 m (5' 7.99\")   Wt 55.3 kg (122 lb)   BMI 18.55 kg/m      Constitutional:  cooperative, alert and oriented, well developed, well nourished, in no acute distress frail      Skin:  warm and dry to the touch, no apparent skin lesions or masses noted        Head:  normocephalic, no masses or lesions        Eyes:  pupils equal and round;conjunctivae and lids unremarkable;sclera white        ENT:  not assessed this visit        Neck:  JVP normal transmitted murmur      Chest:  clear to auscultation;normal symmetry;no tenderness to palpation;normal respiratory excursion;no intercostal retraction;no use of accessory muscles        Cardiac: no S3 or S4 irregular rhythm     systolic murmur;late systolic murmur;grade 3 systolic murmur;RUSB   S1, S2 regular with 3/6 ESM LUSB    Abdomen:  BS normoactive;non-tender        Vascular:   pulses below the femoral arteries are diminished                                    Extremities and Back:  no deformities, clubbing, cyanosis, erythema observed   up to shin    Neurological:  no gross motor deficits;affect appropriate ataxia;limb weakness          PAST MEDICAL HISTORY:  Past Medical History:   Diagnosis Date     Atrial fibrillation (H)     amiodarone therapy discontinued due to pulmonary toxicity     Atrial flutter (H)      Benign essential hypertension 11/20/2018     Cancer (H) vocal cord     Carpal tunnel syndrome     abstracted 7/3/02.     Coronary artery disease involving native coronary artery of native heart without angina pectoris 5/8/2020    Cath 5/28/2020: patent stents; Cath 5/18/2020: ISABEL x4 to RCA; Cath 3/2020: 1 vessel disease; Cath 2017: moderate 2 vessel disease     " CVA (cerebral infarction) 5/5/2015     Demyelinating disease of central nervous system, unspecified (H)     abstracted 7/3/02.     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     Pneumonia 8/17/2016     PVD (peripheral vascular disease) (H)      Redundant colon     needs CT colonography     Shingles      SKIN DISORDERS NEC 3/15/2005     Sleep apnea      SVT (supraventricular tachycardia) (H)        PAST SURGICAL HISTORY:  Past Surgical History:   Procedure Laterality Date     BIOPSY  brain 2002     BONE MARROW BIOPSY, BONE SPECIMEN, NEEDLE/TROCAR N/A 6/8/2017    Procedure: BIOPSY BONE MARROW;  UNILATERAL BONE MARROW BIOPSY (CONSCIOUS SEDATION) ;  Surgeon: Jamie Gonzales MD;  Location:  GI     CARDIAC CATHERIZATION  09/05/2017    2V CAD, IFR of RCA 0.95     CV CORONARY ANGIOGRAM N/A 3/23/2020    Procedure: Coronary Angiogram and right heart cath;  Surgeon: Gino eFrrer MD;  Location:  HEART CARDIAC CATH LAB     CV HEART CATHETERIZATION WITH POSSIBLE INTERVENTION N/A 5/28/2020    Procedure: Heart Catheterization with Possible Intervention;  Surgeon: Larry Chawla MD;  Location:  HEART CARDIAC CATH LAB     CV HEART CATHETERIZATION WITH POSSIBLE INTERVENTION N/A 5/18/2020    Procedure: Heart Catheterization with Possible Intervention;  Surgeon: Gaurang Swenson MD;  Location:  HEART CARDIAC CATH LAB     CV  INSTANTANEOUS WAVE-FREE RATIO N/A 3/23/2020    Procedure: Instantaneous Wave-Free Ratio;  Surgeon: Gino Ferrer MD;  Location:  HEART CARDIAC CATH LAB     CV PCI ATHERECTOMY ORBITAL N/A 5/18/2020    Procedure: Percutaneous Coronary Intervention Atherectomy Rotational;  Surgeon: Gaurang Swenson MD;  Location:  HEART CARDIAC CATH LAB     CV PCI STENT DRUG ELUTING N/A 5/18/2020    Procedure: Percutaneous Coronary Intervention Stent Drug Eluting;  Surgeon: Gaurang Swenson MD;  Location:  HEART CARDIAC CATH LAB     CV RIGHT HEART CATH MEASUREMENTS RECORDED N/A 5/28/2020    Procedure: Right Heart Cath;  Surgeon: Larry Chawla MD;  Location:  HEART CARDIAC CATH LAB     CV TEMPORARY PACEMAKER INSERTION N/A 5/18/2020    Procedure: Temporary Pacemaker Insertion;  Surgeon: Gaurang Swenson MD;  Location:  HEART CARDIAC CATH LAB     ESOPHAGOSCOPY, GASTROSCOPY, DUODENOSCOPY (EGD), COMBINED N/A 9/8/2018    Procedure: COMBINED ESOPHAGOSCOPY, GASTROSCOPY, DUODENOSCOPY (EGD), BIOPSY SINGLE OR MULTIPLE;;  Surgeon: Xander Marie MD;  Location:  GI     HEAD & NECK SURGERY       ORTHOPEDIC SURGERY      right arm ulna reset after injury     THORACOSCOPIC WEDGE RESECTION LUNG Right 8/2/2016    Procedure: THORACOSCOPIC WEDGE RESECTION LUNG;  Surgeon: Abdelrahman Noriega MD;  Location:  OR     [facility] NONSPECIFIC PROCEDURE  as a child    T & A. abstracted 7/3/02.     [facility] NONSPECIFIC PROCEDURE  early    CTR. abstracted 7/3/02.       FAMILY HISTORY:  Family History   Problem Relation Age of Onset     Blood Disease Mother         Anemia     Cardiovascular Father      Cancer - colorectal Maternal Grandfather      Hypertension Brother      Diabetes Sister 5        Juvinile Diabetes passed at 36     Respiratory Other         Lung Cancer       SOCIAL HISTORY:  Social History     Socioeconomic History     Marital status:      Spouse name: None     Number of children: None      Years of education: None     Highest education level: None   Occupational History     None   Tobacco Use     Smoking status: Former Smoker     Packs/day: 1.00     Years: 30.00     Pack years: 30.00     Types: Cigarettes     Quit date: 2013     Years since quittin.0     Smokeless tobacco: Never Used   Substance and Sexual Activity     Alcohol use: Yes     Alcohol/week: 42.0 standard drinks     Types: 42 Standard drinks or equivalent per week     Comment: occ     Drug use: No     Sexual activity: Not Currently   Other Topics Concern     Parent/sibling w/ CABG, MI or angioplasty before 65F 55M? Not Asked      Service Not Asked     Blood Transfusions Not Asked     Caffeine Concern Yes     Comment: 1 Coke occasionally      Occupational Exposure Not Asked     Hobby Hazards Not Asked     Sleep Concern Not Asked     Stress Concern Not Asked     Weight Concern Not Asked     Special Diet No     Back Care Not Asked     Exercise No     Bike Helmet Not Asked     Seat Belt Not Asked     Self-Exams Not Asked   Social History Narrative    Retired (disability)      Social Determinants of Health     Financial Resource Strain:      Difficulty of Paying Living Expenses:    Food Insecurity:      Worried About Running Out of Food in the Last Year:      Ran Out of Food in the Last Year:    Transportation Needs:      Lack of Transportation (Medical):      Lack of Transportation (Non-Medical):    Physical Activity:      Days of Exercise per Week:      Minutes of Exercise per Session:    Stress:      Feeling of Stress :    Social Connections:      Frequency of Communication with Friends and Family:      Frequency of Social Gatherings with Friends and Family:      Attends Mandaen Services:      Active Member of Clubs or Organizations:      Attends Club or Organization Meetings:      Marital Status:    Intimate Partner Violence:      Fear of Current or Ex-Partner:      Emotionally Abused:      Physically  Abused:      Sexually Abused:                  Thank you for allowing me to participate in the care of your patient.      Sincerely,     Cherise Raymond PA-C     [facility]  cc:   Cherise Raymond PA-C  [address] [address]

## 2021-08-26 NOTE — PROGRESS NOTES
"[facility] HEART CLINIC    I had the pleasure of seeing Alfredo when he and his son Alfredo Mckeon came for follow up of fluid overload.  This 76 year old sees Dr. Cortes and Dr. Xiong for his history of:    1. Severe low flow, low gradient AoS - echo 6/3/2021 with SEBASTIAN 0.5cm2 with mean gradient 19mmHg  2. CAD - noted during TAVR w/u 5/2020. S/p rotational atherectomy and ISABEL x 4 to prox-distal RCA. Complicated by hemorrhagic shock from LFA bleeding. 4 units of blood transfused.  3. Permanent AFib - rate control with digoxin and lopressor. Eliquis for CHADSVASc 6 (HTN, CVA, CAD, age)  4. Mild-moderate MS, severe TR - on echo 6/3/2021  5. H/o L PCA CVA - 2013  6. MEL - on CPAP  7. HFpEF  8. PVD - has R SFA not amenable to PCI  9. Dyslipidemia   10. MGUS - sees Dr. Hurley       I saw Alfredo 7/27 at which time he and his son Alfredo agreed he might have been a little better on Lasix 20 mg daily and KCl (started 6/22). Son Alfredo continued to note significant loss of stamina/muscle mass, as he was \"in bed all the time\" unless he had a doctor's appt.  His diet was very limited d/t poor dentition and he was to go to the dentist the following week.  Due to hypotension (minimally sx'c), I decreased furosemide to 10 mg daily and continue KCl 10 mEq, along with increasing exercise with pedal bike. 1 m follow-up with me rec'd     Interval History:  Dentist visit resulted in recommendations for extensive work for periodontal disease, cavities, an infected tooth and implants/dentures.  Given extensive cost, he's not done any of this yet.    Thinks nutrition is better as has doubled up on Boost. Appetite improving. Weight and stamina have improved some.    He has been using his foot bicycle at least once a day. Started at 5 minutes, up to 10 minutes. His stamina has improved.    BP has come up since reducing Lasix to the 10 mg daily at last visit 7/27 but he's noted increased HIGH. No orthopnea, PND, edema.    VITALS:  Vitals: BP 95/66   Pulse " "92   Ht 1.727 m (5' 8\")   Wt 59.6 kg (131 lb 8 oz)   SpO2 98%   BMI 19.99 kg/m      Diagnostic Testing:  Echocardiogram 6/3/2021 - EF 65-70%. Sev dilated RV, mod decreased RVSF. Severe MAC, 2+MR, mild-mod MS (3.7 mmHg @ 84 bpm). 3-4+TR. Sev AoS. Mean gradient 19mmHg with SEBASTIAN 0.59cm2  Angiogram 5/2020 - Patent RCA stents. No obstructive dz elsewhere. Mild pHTN. Mod-sev elevated RH filling pressures (mean RA 14 mmHg). Nl CO. SEBASTIAN 1.1 cm2, mean gradient 23 mmHg      Plan:  Increase Lasix back to 20 mg daily  1 m follow-up with BMP    Assessment/Plan:    1. Severe aortic stenosis, low flow    This has been followed over the last few years; prolonged recovery from angiogram over 1 year ago 5/202, resulting in 4 stents placed to RCA but complicated by LFA bleed/hemorrhagic shock/prolonged hospitalization. CP following stent implantation did improve    Dr. Cortes had noted he be a poor candidate for TAVR given his extreme frailty when he saw him ~2 m ago.  He recommended boosting his calories. He's now seen dentist but hasn't had any of the recommended work done for this. He's using BID Boost, using a pedal bike to build stamina and really looks less frail today!   Today, weight is up; no edema    At last visit, they were not ready to discuss with Palliative Care will continue to assess given multiple medical issues    PLAN:    Increase Lasix back to 20 mg daily as SOB increased after I reduced this 7/27.     Increase activity, building pedal bike up to 10 minutes 2-3x/day    Jr Alfredo will call me in ~2 weeks with update on breathing after increasing Lasix back to 20 mg daily    See me 1 m with blood work    2. HFpEF    Since decreasing Lasix from 20 to 10 mg at 7/27 OV d/t minimally sx'c hypotension, his breathing seems more labored. I noted this on exam and he mentioned it as well. LLL crackle. No edema    Weight at that visit 122#, now up to 130#!    PLAN:    Increase Lasix back to 20 mg daily    Jr Alfredo will call me " in ~2 weeks with update on breathing after increasing Lasix back to 20 mg daily    See me 1 m with blood work    3. CAD    As above, prolonged hospitalization following 4 stents placed to RCA, with LFA bleed/hemorrhagic shock/prolonged hospitalization    CP had improved following stent implantation    Remains on BB, statin, Plavix.    PLAN:    Continue current meds      4. Permanent AFib    Remains on proper dose of Eliquis given age/weight/Cr criteria. CHADSVASc 6 (HTN, CVA, CAD, age)    Rate has been under good control on low dose metoprolol XL 25 mg daily and digoxin 125 mcg daily    PLAN:    Continue current meds      Odette Raymond PA-C, MSPAS      Orders Placed This Encounter   Procedures     Basic metabolic panel     Follow-Up with Cardiac Advanced Practice Provider     Orders Placed This Encounter   Medications     furosemide (LASIX) 20 MG tablet     Sig: Take 1 tablet (20 mg) by mouth daily     Medications Discontinued During This Encounter   Medication Reason     polyethylene glycol (MIRALAX) 17 g packet      furosemide (LASIX) 20 MG tablet          Encounter Diagnoses   Name Primary?     Chronic diastolic congestive heart failure (H)      Aortic valve stenosis, etiology of cardiac valve disease unspecified      Coronary artery disease involving native coronary artery of native heart without angina pectoris        CURRENT MEDICATIONS:  Current Outpatient Medications   Medication Sig Dispense Refill     acetaminophen (TYLENOL) 325 MG tablet Take 650 mg by mouth 3 times daily Plus 650 mg bid prn       apixaban ANTICOAGULANT (ELIQUIS) 5 MG tablet Take 1 tablet (5 mg) by mouth 2 times daily 180 tablet 3     atorvastatin (LIPITOR) 40 MG tablet Take 1 tablet (40 mg) by mouth daily 90 tablet 3     calcium carbonate 600 mg-vitamin D 400 units (CALTRATE) 600-400 MG-UNIT per tablet Take 1 tablet by mouth 2 times daily       clopidogrel (PLAVIX) 75 MG tablet Take 1 tablet (75 mg) by mouth daily 90 tablet 3     digoxin  "(LANOXIN) 125 MCG tablet Take 1 tablet (125 mcg) by mouth daily 90 tablet 3     furosemide (LASIX) 20 MG tablet Take 1 tablet (20 mg) by mouth daily       gabapentin (NEURONTIN) 300 MG capsule TAKE 2 CAPSULES(600 MG) BY MOUTH EVERY EVENING 180 capsule 3     melatonin 1 MG TABS tablet Take 1 mg by mouth nightly as needed for sleep       metoprolol succinate ER (TOPROL XL) 25 MG 24 hr tablet Take 1 tablet (25 mg) by mouth daily 90 tablet 3     mirtazapine (REMERON) 15 MG tablet TAKE 1 TABLET(15 MG) BY MOUTH AT BEDTIME 90 tablet 0     multivitamin (OCUVITE) TABS Take 1 tablet by mouth 2 times daily        potassium chloride ER (K-TAB/KLOR-CON) 10 MEQ CR tablet Take 1 tablet (10 mEq) by mouth daily 90 tablet 0       ALLERGIES     Allergies   Allergen Reactions     Sulfa Drugs Difficulty breathing and Swelling     Adhesive Tape Blisters     Amiodarone Other (See Comments)     Developed pleural effusion     Penicillins Other (See Comments)     Reaction occurred as a child         Review of Systems:  Skin:  Negative rash   Eyes:  Positive for glasses  ENT:  Negative postnasal drainage;hearing loss  Respiratory:  Positive for dyspnea on exertion;sleep apnea;CPAP  Cardiovascular:  Negative for;palpitations;chest pain;edema Positive for;fatigue;lightheadedness;dizziness  Gastroenterology: Positive for diarrhea  Genitourinary:  Negative urgency  Musculoskeletal:  Positive for joint pain;arthritis;muscular weakness;back pain  Neurologic:  Positive for stroke;incoordination;memory problems;headaches  Psychiatric:  Positive for sleep disturbances;excessive stress;anxiety;depression  Heme/Lymph/Imm:  Positive for allergies  Endocrine:  Negative      Physical Exam:  Vitals: BP 95/66   Pulse 92   Ht 1.727 m (5' 8\")   Wt 59.6 kg (131 lb 8 oz)   SpO2 98%   BMI 19.99 kg/m      Constitutional:  cooperative, alert and oriented, well developed, well nourished, in no acute distress frail      Skin:  warm and dry to the touch, no " apparent skin lesions or masses noted        Head:  normocephalic, no masses or lesions        Eyes:  pupils equal and round;conjunctivae and lids unremarkable;sclera white        ENT:  not assessed this visit        Neck:  JVP normal transmitted murmur      Chest:      LLL>RLL crackles    Cardiac: no S3 or S4 irregular rhythm     systolic murmur;late systolic murmur;grade 3 systolic murmur;RUSB   S1, S2 regular with 3/6 ESM LUSB    Abdomen:  BS normoactive;non-tender        Vascular:   pulses below the femoral arteries are diminished                                    Extremities and Back:  no deformities, clubbing, cyanosis, erythema observed   up to shin    Neurological:  no gross motor deficits;affect appropriate ataxia;limb weakness          PAST MEDICAL HISTORY:  Past Medical History:   Diagnosis Date     Atrial fibrillation (H)     amiodarone therapy discontinued due to pulmonary toxicity     Atrial flutter (H)      Benign essential hypertension 11/20/2018     Cancer (H) vocal cord     Carpal tunnel syndrome     abstracted 7/3/02.     Coronary artery disease involving native coronary artery of native heart without angina pectoris 5/8/2020    Cath 5/28/2020: patent stents; Cath 5/18/2020: ISABEL x4 to RCA; Cath 3/2020: 1 vessel disease; Cath 2017: moderate 2 vessel disease     CVA (cerebral infarction) 5/5/2015     Demyelinating disease of central nervous system, unspecified (H)     abstracted 7/3/02.     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     Pneumonia 8/17/2016     PVD (peripheral vascular disease) (H)      Redundant colon     needs CT colonography     Shingles      SKIN DISORDERS NEC 3/15/2005     Sleep apnea      SVT (supraventricular tachycardia) (H)        PAST SURGICAL HISTORY:  Past Surgical History:   Procedure Laterality Date     BIOPSY  brain 2002     BONE MARROW BIOPSY, BONE SPECIMEN, NEEDLE/TROCAR N/A 6/8/2017    Procedure: BIOPSY BONE MARROW;  UNILATERAL BONE MARROW BIOPSY (CONSCIOUS SEDATION) ;  Surgeon: Jamie Gonzales MD;  Location:  GI     CARDIAC CATHERIZATION  09/05/2017    2V CAD, IFR of RCA 0.95     CV CORONARY ANGIOGRAM N/A 3/23/2020    Procedure: Coronary Angiogram and right heart cath;  Surgeon: Gino Ferrer MD;  Location:  HEART CARDIAC CATH LAB     CV HEART CATHETERIZATION WITH POSSIBLE INTERVENTION N/A 5/28/2020    Procedure: Heart Catheterization with Possible Intervention;  Surgeon: Larry Chawla MD;  Location:  HEART CARDIAC CATH LAB     CV HEART CATHETERIZATION WITH POSSIBLE INTERVENTION N/A 5/18/2020    Procedure: Heart Catheterization with Possible Intervention;  Surgeon: Gaurang Swenson MD;  Location:  HEART CARDIAC CATH LAB     CV INSTANTANEOUS WAVE-FREE RATIO N/A 3/23/2020    Procedure: Instantaneous Wave-Free Ratio;  Surgeon: Gino Ferrer MD;  Location:  HEART CARDIAC CATH LAB     CV PCI ATHERECTOMY ORBITAL N/A 5/18/2020    Procedure: Percutaneous Coronary Intervention Atherectomy Rotational;  Surgeon: Gaurang Swenson MD;  Location:  HEART CARDIAC CATH LAB     CV PCI STENT DRUG ELUTING N/A 5/18/2020    Procedure: Percutaneous Coronary Intervention Stent Drug Eluting;  Surgeon: Gaurang Swenson MD;  Location:  HEART CARDIAC CATH LAB     CV RIGHT HEART CATH MEASUREMENTS RECORDED N/A 5/28/2020    Procedure: Right Heart Cath;  Surgeon: [place]  Larry Kaufman MD;  Location:  HEART CARDIAC CATH LAB     CV TEMPORARY PACEMAKER INSERTION N/A 2020    Procedure: Temporary Pacemaker Insertion;  Surgeon: Gaurang Swenson MD;  Location:  HEART CARDIAC CATH LAB     ESOPHAGOSCOPY, GASTROSCOPY, DUODENOSCOPY (EGD), COMBINED N/A 2018    Procedure: COMBINED ESOPHAGOSCOPY, GASTROSCOPY, DUODENOSCOPY (EGD), BIOPSY SINGLE OR MULTIPLE;;  Surgeon: Xander Marie MD;  Location:  GI     HEAD & NECK SURGERY       ORTHOPEDIC SURGERY      right arm ulna reset after injury     THORACOSCOPIC WEDGE RESECTION LUNG Right 2016    Procedure: THORACOSCOPIC WEDGE RESECTION LUNG;  Surgeon: Abdelrahman Noriega MD;  Location:  OR     [facility] NONSPECIFIC PROCEDURE  as a child    T & A. abstracted 7/3/02.     ZZC NONSPECIFIC PROCEDURE  early    CTR. abstracted 7/3/02.       FAMILY HISTORY:  Family History   Problem Relation Age of Onset     Blood Disease Mother         Anemia     Cardiovascular Father      Cancer - colorectal Maternal Grandfather      Hypertension Brother      Diabetes Sister 5        Juvinile Diabetes passed at 36     Respiratory Other         Lung Cancer       SOCIAL HISTORY:  Social History     Socioeconomic History     Marital status:      Spouse name: None     Number of children: None     Years of education: None     Highest education level: None   Occupational History     None   Tobacco Use     Smoking status: Former Smoker     Packs/day: 1.00     Years: 30.00     Pack years: 30.00     Types: Cigarettes     Quit date: 2013     Years since quittin.1     Smokeless tobacco: Never Used   Substance and Sexual Activity     Alcohol use: Yes     Alcohol/week: 42.0 standard drinks     Types: 42 Standard drinks or equivalent per week     Comment: occ     Drug use: No     Sexual activity: Not Currently   Other Topics Concern     Parent/sibling w/ CABG, MI or angioplasty before 65F 55M? Not Asked      Service Not Asked      Blood Transfusions Not Asked     Caffeine Concern Yes     Comment: 1 Coke occasionally      Occupational Exposure Not Asked     Hobby Hazards Not Asked     Sleep Concern Not Asked     Stress Concern Not Asked     Weight Concern Not Asked     Special Diet No     Back Care Not Asked     Exercise No     Bike Helmet Not Asked     Seat Belt Not Asked     Self-Exams Not Asked   Social History Narrative    Retired (disability)      Social Determinants of Health     Financial Resource Strain:      Difficulty of Paying Living Expenses:    Food Insecurity:      Worried About Running Out of Food in the Last Year:      Ran Out of Food in the Last Year:    Transportation Needs:      Lack of Transportation (Medical):      Lack of Transportation (Non-Medical):    Physical Activity:      Days of Exercise per Week:      Minutes of Exercise per Session:    Stress:      Feeling of Stress :    Social Connections:      Frequency of Communication with Friends and Family:      Frequency of Social Gatherings with Friends and Family:      Attends Shinto Services:      Active Member of Clubs or Organizations:      Attends Club or Organization Meetings:      Marital Status:    Intimate Partner Violence:      Fear of Current or Ex-Partner:      Emotionally Abused:      Physically Abused:      Sexually Abused:

## 2021-08-30 ENCOUNTER — OFFICE VISIT (OUTPATIENT)
Dept: CARDIOLOGY | Facility: CLINIC | Age: 76
End: 2021-08-30
Attending: PHYSICIAN ASSISTANT
Payer: MEDICARE

## 2021-08-30 VITALS
WEIGHT: 131.5 LBS | BODY MASS INDEX: 19.93 KG/M2 | HEIGHT: 68 IN | SYSTOLIC BLOOD PRESSURE: 95 MMHG | DIASTOLIC BLOOD PRESSURE: 66 MMHG | HEART RATE: 92 BPM | OXYGEN SATURATION: 98 %

## 2021-08-30 DIAGNOSIS — I35.0 AORTIC VALVE STENOSIS, ETIOLOGY OF CARDIAC VALVE DISEASE UNSPECIFIED: ICD-10-CM

## 2021-08-30 DIAGNOSIS — I50.32 CHRONIC DIASTOLIC CONGESTIVE HEART FAILURE (H): ICD-10-CM

## 2021-08-30 DIAGNOSIS — I25.10 CORONARY ARTERY DISEASE INVOLVING NATIVE CORONARY ARTERY OF NATIVE HEART WITHOUT ANGINA PECTORIS: ICD-10-CM

## 2021-08-30 PROCEDURE — 99214 OFFICE O/P EST MOD 30 MIN: CPT | Performed by: PHYSICIAN ASSISTANT

## 2021-08-30 RX ORDER — FUROSEMIDE 20 MG
20 TABLET ORAL DAILY
Start: 2021-08-30 | End: 2021-10-18

## 2021-08-30 ASSESSMENT — MIFFLIN-ST. JEOR: SCORE: 1290.98

## 2021-08-30 NOTE — PATIENT INSTRUCTIONS
Alfredo - it was nice to see you and Alfredo today    1. Reviewed that you've had a little more breathing trouble since I saw you - I think this is due to me reducing the furosemide at your last visit!  2. I'm THRILLED you're using the pedal bike ... keep building up to 2-3 times/day    PLAN:  1. Increase  furosemide back to 20 mg daily (a full tablet)  2. Pls have Alfredo Mojica call me in ~2 weeks (mid Sept) with update on breathing - I expect it be less labored with the higher dose of water pill  3. See me with blood work in 1 month

## 2021-08-30 NOTE — LETTER
"8/30/2021    Danny Paige MD, MD  [address]  [place] [address]    RE: Efrem Ramos       Dear Colleague,    I had the pleasure of seeing Efrem Ramos in the [facility].    [facility]    I had the pleasure of seeing Alfredo when he and his son Alfredo Mckeon came for follow up of fluid overload.  This 76 year old sees Dr. Cortes and Dr. Xiong for his history of:    1. Severe low flow, low gradient AoS - echo 6/3/2021 with SEBASTIAN 0.5cm2 with mean gradient 19mmHg  2. CAD - noted during TAVR w/u 5/2020. S/p rotational atherectomy and ISABEL x 4 to prox-distal RCA. Complicated by hemorrhagic shock from LFA bleeding. 4 units of blood transfused.  3. Permanent AFib - rate control with digoxin and lopressor. Eliquis for CHADSVASc 6 (HTN, CVA, CAD, age)  4. Mild-moderate MS, severe TR - on echo 6/3/2021  5. H/o L PCA CVA - 2013  6. MEL - on CPAP  7. HFpEF  8. PVD - has R SFA not amenable to PCI  9. Dyslipidemia   10. MGUS - sees Dr. Hurley       I saw Alfredo 7/27 at which time he and his son Alfredo agreed he might have been a little better on Lasix 20 mg daily and KCl (started 6/22). Son Alfredo continued to note significant loss of stamina/muscle mass, as he was \"in bed all the time\" unless he had a doctor's appt.  His diet was very limited d/t poor dentition and he was to go to the dentist the following week.  Due to hypotension (minimally sx'c), I decreased furosemide to 10 mg daily and continue KCl 10 mEq, along with increasing exercise with pedal bike. 1 m follow-up with me rec'd     Interval History:  Dentist visit resulted in recommendations for extensive work for periodontal disease, cavities, an infected tooth and implants/dentures.  Given extensive cost, he's not done any of this yet.    Thinks nutrition is better as has doubled up on Boost. Appetite improving. Weight and stamina have improved some.    He has been using his foot bicycle " "at least once a day. Started at 5 minutes, up to 10 minutes. His stamina has improved.    BP has come up since reducing Lasix to the 10 mg daily at last visit 7/27 but he's noted increased HIHG. No orthopnea, PND, edema.    VITALS:  Vitals: BP 95/66   Pulse 92   Ht 1.727 m (5' 8\")   Wt 59.6 kg (131 lb 8 oz)   SpO2 98%   BMI 19.99 kg/m      Diagnostic Testing:  Echocardiogram 6/3/2021 - EF 65-70%. Sev dilated RV, mod decreased RVSF. Severe MAC, 2+MR, mild-mod MS (3.7 mmHg @ 84 bpm). 3-4+TR. Sev AoS. Mean gradient 19mmHg with SEBASTIAN 0.59cm2  Angiogram 5/2020 - Patent RCA stents. No obstructive dz elsewhere. Mild pHTN. Mod-sev elevated RH filling pressures (mean RA 14 mmHg). Nl CO. SEBASTIAN 1.1 cm2, mean gradient 23 mmHg      Plan:  Increase Lasix back to 20 mg daily  1 m follow-up with BMP    Assessment/Plan:    1. Severe aortic stenosis, low flow    This has been followed over the last few years; prolonged recovery from angiogram over 1 year ago 5/202, resulting in 4 stents placed to RCA but complicated by LFA bleed/hemorrhagic shock/prolonged hospitalization. CP following stent implantation did improve    Dr. Cortes had noted he be a poor candidate for TAVR given his extreme frailty when he saw him ~2 m ago.  He recommended boosting his calories. He's now seen dentist but hasn't had any of the recommended work done for this. He's using BID Boost, using a pedal bike to build stamina and really looks less frail today!   Today, weight is up; no edema    At last visit, they were not ready to discuss with Palliative Care will continue to assess given multiple medical issues    PLAN:    Increase Lasix back to 20 mg daily as SOB increased after I reduced this 7/27.     Increase activity, building pedal bike up to 10 minutes 2-3x/day    Jr Alfredo will call me in ~2 weeks with update on breathing after increasing Lasix back to 20 mg daily    See me 1 m with blood work    2. HFpEF    Since decreasing Lasix from 20 to 10 mg at 7/27 " OV d/t minimally sx'c hypotension, his breathing seems more labored. I noted this on exam and he mentioned it as well. LLL crackle. No edema    Weight at that visit 122#, now up to 130#!    PLAN:    Increase Lasix back to 20 mg daily    Jr Alfredo will call me in ~2 weeks with update on breathing after increasing Lasix back to 20 mg daily    See me 1 m with blood work    3. CAD    As above, prolonged hospitalization following 4 stents placed to RCA, with LFA bleed/hemorrhagic shock/prolonged hospitalization    CP had improved following stent implantation    Remains on BB, statin, Plavix.    PLAN:    Continue current meds      4. Permanent AFib    Remains on proper dose of Eliquis given age/weight/Cr criteria. CHADSVASc 6 (HTN, CVA, CAD, age)    Rate has been under good control on low dose metoprolol XL 25 mg daily and digoxin 125 mcg daily    PLAN:    Continue current meds      Odette Raymond PA-C, MSPAS      Orders Placed This Encounter   Procedures     Basic metabolic panel     Follow-Up with Cardiac Advanced Practice Provider     Orders Placed This Encounter   Medications     furosemide (LASIX) 20 MG tablet     Sig: Take 1 tablet (20 mg) by mouth daily     Medications Discontinued During This Encounter   Medication Reason     polyethylene glycol (MIRALAX) 17 g packet      furosemide (LASIX) 20 MG tablet          Encounter Diagnoses   Name Primary?     Chronic diastolic congestive heart failure (H)      Aortic valve stenosis, etiology of cardiac valve disease unspecified      Coronary artery disease involving native coronary artery of native heart without angina pectoris        CURRENT MEDICATIONS:  Current Outpatient Medications   Medication Sig Dispense Refill     acetaminophen (TYLENOL) 325 MG tablet Take 650 mg by mouth 3 times daily Plus 650 mg bid prn       apixaban ANTICOAGULANT (ELIQUIS) 5 MG tablet Take 1 tablet (5 mg) by mouth 2 times daily 180 tablet 3     atorvastatin (LIPITOR) 40 MG tablet Take 1 tablet (40  mg) by mouth daily 90 tablet 3     calcium carbonate 600 mg-vitamin D 400 units (CALTRATE) 600-400 MG-UNIT per tablet Take 1 tablet by mouth 2 times daily       clopidogrel (PLAVIX) 75 MG tablet Take 1 tablet (75 mg) by mouth daily 90 tablet 3     digoxin (LANOXIN) 125 MCG tablet Take 1 tablet (125 mcg) by mouth daily 90 tablet 3     furosemide (LASIX) 20 MG tablet Take 1 tablet (20 mg) by mouth daily       gabapentin (NEURONTIN) 300 MG capsule TAKE 2 CAPSULES(600 MG) BY MOUTH EVERY EVENING 180 capsule 3     melatonin 1 MG TABS tablet Take 1 mg by mouth nightly as needed for sleep       metoprolol succinate ER (TOPROL XL) 25 MG 24 hr tablet Take 1 tablet (25 mg) by mouth daily 90 tablet 3     mirtazapine (REMERON) 15 MG tablet TAKE 1 TABLET(15 MG) BY MOUTH AT BEDTIME 90 tablet 0     multivitamin (OCUVITE) TABS Take 1 tablet by mouth 2 times daily        potassium chloride ER (K-TAB/KLOR-CON) 10 MEQ CR tablet Take 1 tablet (10 mEq) by mouth daily 90 tablet 0       ALLERGIES     Allergies   Allergen Reactions     Sulfa Drugs Difficulty breathing and Swelling     Adhesive Tape Blisters     Amiodarone Other (See Comments)     Developed pleural effusion     Penicillins Other (See Comments)     Reaction occurred as a child         Review of Systems:  Skin:  Negative rash   Eyes:  Positive for glasses  ENT:  Negative postnasal drainage;hearing loss  Respiratory:  Positive for dyspnea on exertion;sleep apnea;CPAP  Cardiovascular:  Negative for;palpitations;chest pain;edema Positive for;fatigue;lightheadedness;dizziness  Gastroenterology: Positive for diarrhea  Genitourinary:  Negative urgency  Musculoskeletal:  Positive for joint pain;arthritis;muscular weakness;back pain  Neurologic:  Positive for stroke;incoordination;memory problems;headaches  Psychiatric:  Positive for sleep disturbances;excessive stress;anxiety;depression  Heme/Lymph/Imm:  Positive for allergies  Endocrine:  Negative      Physical Exam:  Vitals: BP  "95/66   Pulse 92   Ht 1.727 m (5' 8\")   Wt 59.6 kg (131 lb 8 oz)   SpO2 98%   BMI 19.99 kg/m      Constitutional:  cooperative, alert and oriented, well developed, well nourished, in no acute distress frail      Skin:  warm and dry to the touch, no apparent skin lesions or masses noted        Head:  normocephalic, no masses or lesions        Eyes:  pupils equal and round;conjunctivae and lids unremarkable;sclera white        ENT:  not assessed this visit        Neck:  JVP normal transmitted murmur      Chest:      LLL>RLL crackles    Cardiac: no S3 or S4 irregular rhythm     systolic murmur;late systolic murmur;grade 3 systolic murmur;RUSB   S1, S2 regular with 3/6 ESM LUSB    Abdomen:  BS normoactive;non-tender        Vascular:   pulses below the femoral arteries are diminished                                    Extremities and Back:  no deformities, clubbing, cyanosis, erythema observed   up to shin    Neurological:  no gross motor deficits;affect appropriate ataxia;limb weakness          PAST MEDICAL HISTORY:  Past Medical History:   Diagnosis Date     Atrial fibrillation (H)     amiodarone therapy discontinued due to pulmonary toxicity     Atrial flutter (H)      Benign essential hypertension 11/20/2018     Cancer (H) vocal cord     Carpal tunnel syndrome     abstracted 7/3/02.     Coronary artery disease involving native coronary artery of native heart without angina pectoris 5/8/2020    Cath 5/28/2020: patent stents; Cath 5/18/2020: ISABEL x4 to RCA; Cath 3/2020: 1 vessel disease; Cath 2017: moderate 2 vessel disease     CVA (cerebral infarction) 5/5/2015     Demyelinating disease of central nervous system, unspecified (H)     abstracted 7/3/02.     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     Pneumonia 8/17/2016     PVD (peripheral vascular disease) (H)      Redundant colon     needs CT colonography     Shingles      SKIN DISORDERS NEC 3/15/2005     Sleep apnea      SVT (supraventricular tachycardia) (H)        PAST SURGICAL HISTORY:  Past Surgical History:   Procedure Laterality Date     BIOPSY  brain 2002     BONE MARROW BIOPSY, BONE SPECIMEN, NEEDLE/TROCAR N/A 6/8/2017    Procedure: BIOPSY BONE MARROW;  UNILATERAL BONE MARROW BIOPSY (CONSCIOUS SEDATION) ;  Surgeon: Jamie Gonzales MD;  Location:  GI     CARDIAC CATHERIZATION  09/05/2017    2V CAD, IFR of RCA 0.95     CV CORONARY ANGIOGRAM N/A 3/23/2020    Procedure: Coronary Angiogram and right heart cath;  Surgeon: Gino Ferrer MD;  Location:  HEART CARDIAC CATH LAB     CV HEART CATHETERIZATION WITH POSSIBLE INTERVENTION N/A 5/28/2020    Procedure: Heart Catheterization with Possible Intervention;  Surgeon: Larry Chawla MD;  Location:  HEART CARDIAC CATH LAB     CV HEART CATHETERIZATION WITH POSSIBLE INTERVENTION N/A 5/18/2020    Procedure: Heart Catheterization with Possible Intervention;  Surgeon: Gaurang Swenson MD;  Location:  HEART CARDIAC CATH LAB     CV INSTANTANEOUS WAVE-FREE RATIO N/A 3/23/2020    Procedure: Instantaneous Wave-Free Ratio;  Surgeon: Gino Ferrer MD;  Location:  HEART CARDIAC CATH LAB     CV PCI ATHERECTOMY ORBITAL N/A 5/18/2020    Procedure: Percutaneous Coronary Intervention Atherectomy Rotational;  Surgeon: Gaurang Swenson MD;  Location:  HEART CARDIAC CATH LAB     CV PCI STENT DRUG ELUTING N/A 5/18/2020     Procedure: Percutaneous Coronary Intervention Stent Drug Eluting;  Surgeon: Gaurang Swenson MD;  Location:  HEART CARDIAC CATH LAB     CV RIGHT HEART CATH MEASUREMENTS RECORDED N/A 2020    Procedure: Right Heart Cath;  Surgeon: Larry Chawla MD;  Location:  HEART CARDIAC CATH LAB     CV TEMPORARY PACEMAKER INSERTION N/A 2020    Procedure: Temporary Pacemaker Insertion;  Surgeon: Gaurang Swenson MD;  Location:  HEART CARDIAC CATH LAB     ESOPHAGOSCOPY, GASTROSCOPY, DUODENOSCOPY (EGD), COMBINED N/A 2018    Procedure: COMBINED ESOPHAGOSCOPY, GASTROSCOPY, DUODENOSCOPY (EGD), BIOPSY SINGLE OR MULTIPLE;;  Surgeon: Xander Marie MD;  Location:  GI     HEAD & NECK SURGERY       ORTHOPEDIC SURGERY      right arm ulna reset after injury     THORACOSCOPIC WEDGE RESECTION LUNG Right 2016    Procedure: THORACOSCOPIC WEDGE RESECTION LUNG;  Surgeon: Abdelrahman Noriega MD;  Location:  OR     [facility] NONSPECIFIC PROCEDURE  as a child    T & A. abstracted 7/3/02.     ZZC NONSPECIFIC PROCEDURE  early    CTR. abstracted 7/3/02.       FAMILY HISTORY:  Family History   Problem Relation Age of Onset     Blood Disease Mother         Anemia     Cardiovascular Father      Cancer - colorectal Maternal Grandfather      Hypertension Brother      Diabetes Sister 5        Juvinile Diabetes passed at 36     Respiratory Other         Lung Cancer       SOCIAL HISTORY:  Social History     Socioeconomic History     Marital status:      Spouse name: None     Number of children: None     Years of education: None     Highest education level: None   Occupational History     None   Tobacco Use     Smoking status: Former Smoker     Packs/day: 1.00     Years: 30.00     Pack years: 30.00     Types: Cigarettes     Quit date: 2013     Years since quittin.1     Smokeless tobacco: Never Used   Substance and Sexual Activity     Alcohol use: Yes     Alcohol/week: 42.0 standard drinks      Types: 42 Standard drinks or equivalent per week     Comment: occ     Drug use: No     Sexual activity: Not Currently   Other Topics Concern     Parent/sibling w/ CABG, MI or angioplasty before 65F 55M? Not Asked      Service Not Asked     Blood Transfusions Not Asked     Caffeine Concern Yes     Comment: 1 Coke occasionally      Occupational Exposure Not Asked     Hobby Hazards Not Asked     Sleep Concern Not Asked     Stress Concern Not Asked     Weight Concern Not Asked     Special Diet No     Back Care Not Asked     Exercise No     Bike Helmet Not Asked     Seat Belt Not Asked     Self-Exams Not Asked   Social History Narrative    Retired (disability)      Social Determinants of Health     Financial Resource Strain:      Difficulty of Paying Living Expenses:    Food Insecurity:      Worried About Running Out of Food in the Last Year:      Ran Out of Food in the Last Year:    Transportation Needs:      Lack of Transportation (Medical):      Lack of Transportation (Non-Medical):    Physical Activity:      Days of Exercise per Week:      Minutes of Exercise per Session:    Stress:      Feeling of Stress :    Social Connections:      Frequency of Communication with Friends and Family:      Frequency of Social Gatherings with Friends and Family:      Attends Religion Services:      Active Member of Clubs or Organizations:      Attends Club or Organization Meetings:      Marital Status:    Intimate Partner Violence:      Fear of Current or Ex-Partner:      Emotionally Abused:      Physically Abused:      Sexually Abused:            Thank you for allowing me to participate in the care of your patient.      Sincerely,     Cherise Raymond PA-C     [facility]  cc:   Cherise Raymond PA-C  [address]  [place] [address]

## 2021-09-07 ENCOUNTER — TELEPHONE (OUTPATIENT)
Dept: FAMILY MEDICINE | Facility: CLINIC | Age: 76
End: 2021-09-07

## 2021-09-07 DIAGNOSIS — G25.81 RESTLESS LEGS SYNDROME (RLS): ICD-10-CM

## 2021-09-08 RX ORDER — GABAPENTIN 300 MG/1
CAPSULE ORAL
Qty: 180 CAPSULE | Refills: 0 | Status: SHIPPED | OUTPATIENT
Start: 2021-09-08 | End: 2021-10-26

## 2021-09-08 NOTE — TELEPHONE ENCOUNTER
OK to refill x 1 - Please call to schedule a follow up visit  - virtual visit     Danny Paige MD, MD

## 2021-09-08 NOTE — TELEPHONE ENCOUNTER
gabapentin (NEURONTIN) 300 MG capsule 180 capsule 3 9/14/2020  No   Sig: TAKE 2 CAPSULES(600 MG) BY MOUTH EVERY EVENING   Sent to pharmacy as: Gabapentin 300 MG Oral Capsule (NEURONTIN)   Class: E-Prescribe   Notes to Pharmacy: **Patient requests 90 days supply**   Order: [id number]   E-Prescribing Status: Receipt confirmed by pharmacy (9/14/2020  6:30 PM CDT)     Last visit 4/2/2020 - virtual with Dr Mishra  Last OV with PCP 1/16/2019     Called patient to advise overdue for visit with PCP     Wife is in ICU in the hospital so he declines to set up an appointment at this time     Asked that TC reach out to him in a week or so     .Routing refill request to provider for review/approval because:  Drug not on the [facility] refill protocol     Bella BURNS RN

## 2021-09-09 NOTE — TELEPHONE ENCOUNTER
Called attempted to pt. Son answered call. Left message with son asking pt call clinic back for VV with PCP. Son agreed to relay message. Postponing message to follow up with pt. Once appt is scheduled, encounter can be closed.

## 2021-09-13 DIAGNOSIS — F32.A DEPRESSION, UNSPECIFIED DEPRESSION TYPE: ICD-10-CM

## 2021-09-14 RX ORDER — MIRTAZAPINE 15 MG/1
TABLET, FILM COATED ORAL
Qty: 30 TABLET | Refills: 0 | Status: SHIPPED | OUTPATIENT
Start: 2021-09-14 | End: 2021-10-18

## 2021-09-14 NOTE — TELEPHONE ENCOUNTER
Last visit 4/2/2020 - virtual     Due for appointment and PHQ-9     Sent [employer] message advising due and to complete PHQ9     Routing refill request to provider for review/approval because:  Edna given x1 and patient did not follow up, please advise  Patient needs to be seen because it has been more than 1 year since last office visit.    Bella BURNS RN

## 2021-09-15 ENCOUNTER — TELEPHONE (OUTPATIENT)
Dept: FAMILY MEDICINE | Facility: CLINIC | Age: 76
End: 2021-09-15

## 2021-09-15 NOTE — TELEPHONE ENCOUNTER
"PCP FYI: patient's son called for refill of Mirtazpine. Script sent yesterday 9/14/21 for 30 day supply. Per son, patient's wife is in the ICU and wasn't sure how much time she had left. Writer offered to schedule appt for patient, son declined at this time but states that they will call and schedule but d/t patient's wife being in the hospital, unsure when they will be able to come in.    Efrem Ramos JR. Calling [facility],    Patient's wife is in the ICU,    Refill request for remron: was sent on 9/14/2021 for 30 day supply. Patient due for visit. LOV 4/2/2020.     Writer offered to schedule an appt. Declined at this time.   \"I will set it up as soon as I am able to consult with him (the patient) and when is a good time to do it\".  Writer again notifed that the script is for a 30 day supply.    Sharon Zaragoza RN  [facility]    "

## 2021-09-16 NOTE — TELEPHONE ENCOUNTER
Writer spoke with patient's son yesterday 9/15/2021, see telephone encounter.    Sharon Zaragoza RN  [facility]

## 2021-09-28 NOTE — PROGRESS NOTES
[facility]    I had the pleasure of seeing Alfredo when he and his son Alfredo Mojica came for follow up of fluid overload and severe valvular dz.  This 76 year old sees Dr. Xiong and Dr. Cortes for his history of:     1. Severe low flow, low gradient AoS - echo 6/3/2021 with SEBASTIAN 0.5cm2 with mean gradient 19mmHg  2. CAD - noted during TAVR w/u 5/2020. S/p rotational atherectomy and ISABEL x 4 to prox-distal RCA. Complicated by hemorrhagic shock from LFA bleeding. 4 units of blood transfused.  3. Permanent AFib - rate control with digoxin and lopressor. Eliquis for CHADSVASc 6 (HTN, CVA, CAD, age)  4. Mild-moderate MS, severe TR - on echo 6/3/2021  5. H/o L PCA CVA - 2013  6. MEL - on CPAP  7. HFpEF  8. PVD - has R SFA not amenable to PCI  9. Dyslipidemia   10. MGUS - sees Dr. Hurley    I saw Alfredo 8/2021 at which time we reviewed that nutrition was improving and weight was climbing from calories.  He was using his foot bicycle at least daily, up to 10 minutes and stamina was improving.  HIGH had increased after I reduced his furosemide 20 mg to 10 mg daily d/t asx'c hypotension.    I asked that he increase his Lasix back to 20 mg daily and increase activity, building up pedal bike to 2-3x/day. 1 m follow-up with blood work rec'd.     Interval History:  Alfredo's wife just passed away from [facility], which has been very hard. She required intubation and a Feeding Tube but was unable to come off the ventilator.  It turns out that she'd never reduced the furosemide from 20 to 10 mg daily as I had instructed a few visits ago (d/t hypotension) but instead has remained on the 20 mg.    Alfredo and his son are at a loss with this, given she did all the finances and medications.  Alfredo Mojica is still encouraging his dad to exercise and eat. He has Boost and Cherry Coke for breakfast, has yogurt and Naked juice for lunch and has a frozen dinner (supa's pie), Boost, pudding or appl sauce for supper.    With his grieving, he's really not  "had the \"gumption\" to increase exercise but is typically doing 10-15 minutes on the foot pedal bike.    VITALS:  Vitals: /64 (BP Location: Left arm, Cuff Size: Adult Regular)   Pulse 91   Ht 1.727 m (5' 8\")   Wt 59.9 kg (132 lb)   SpO2 100%   BMI 20.07 kg/m      Diagnostic Testing:  Echocardiogram 6/3/2021 - EF 65-70%. Sev dilated RV, mod decreased RVSF. Severe MAC, 2+MR, mild-mod MS (3.7 mmHg @ 84 bpm). 3-4+TR. Sev AoS. Mean gradient 19mmHg with SEBASTIAN 0.59cm2  Angiogram 5/2020 - Patent RCA stents. No obstructive dz elsewhere. Mild pHTN. Mod-sev elevated RH filling pressures (mean RA 14 mmHg). Nl CO. SEBASTIAN 1.1 cm2, mean gradient 23 mmHg  Component      Latest Ref Rng & Units 6/22/2021 7/27/2021 9/30/2021   Sodium      133 - 144 mmol/L 140 139 138   Potassium      3.4 - 5.3 mmol/L 4.0 3.7 3.5   Chloride      94 - 109 mmol/L 109 106 106   Carbon Dioxide      20 - 32 mmol/L 28 29 29   Anion Gap      3 - 14 mmol/L 3 4 3   Glucose      70 - 99 mg/dL 85 90 90   Urea Nitrogen      7 - 30 mg/dL 13 22 23   Creatinine      0.66 - 1.25 mg/dL 0.97 0.97 1.06   GFR Estimate      >60 mL/min/1.73m2 74 74 67   GFR Estimate If Black      >60 mL/min/1.73:m2 86     Calcium      8.5 - 10.1 mg/dL 8.7 8.7 8.5       Plan:  Increase exercise    Assessment/Plan:    1. Severe Aortic Stenosis, low flow    This has been followed over the last few  Years; prolonged recovery from angiogram >1 year ago 5/2020, which resulted in 4 stents placed to RCA but complicated by LFA bleed/hemorrhagic shock/prlonged hospitalization. CP did improve after stents placed, however    Dr. Cortes met with him 6/2021 noting he was a poor candidate for TAVR given his extreme frailty. We've been working on building up stamina/calories    Dentist's recommendations for implants/dentures have taken a \"backseat\" given his wife's recent death and he continues to eat just soft food.  Alfredo Mojica is trying to boost his calories with applesauce, Boost, yogurt, " "etc    PLAN:    Continue current meds    Increase exercise, aiming for 10-15 minutes 3 times a day before he sees me    Spoke with Nasra,  who gave # for [facility] [employer] to help with Alfredo's questions about finances, etc. [phone].     1 m follow-up     2. HFpEF    He's now on Lasix 20 mg daily (had never been decreased to 10 mg on 7/27 d/t minimally sx'c hypotension, they found out)    BMP today is stable    BP OK    Breathing \"OK\"    PLAN:    Continue current meds    3. CAD    As above, prolonged hospitalization following 4 stents placed to RCA, with LFA bleed/hemorrhagic shock/prolonged hospitalization    CP had improved following stent implantation    Remains on BB, statin, Plavix.     PLAN:    Continue current meds        4. Permanent AFib    Remains on proper dose of Eliquis given age/weight/Cr criteria. CHADSVASc 6 (HTN, CVA, CAD, age)    Rate has been under good control on low dose metoprolol XL 25 mg daily and digoxin 125 mcg daily     PLAN:    Continue current meds        Odette Raymond PA-C, MSPAS      Orders Placed This Encounter   Procedures     Follow-Up with Cardiac Advanced Practice Provider     No orders of the defined types were placed in this encounter.    There are no discontinued medications.      Encounter Diagnoses   Name Primary?     Chronic diastolic congestive heart failure (H)      Aortic valve stenosis, etiology of cardiac valve disease unspecified      Coronary artery disease involving native coronary artery of native heart without angina pectoris        CURRENT MEDICATIONS:  Current Outpatient Medications   Medication Sig Dispense Refill     acetaminophen (TYLENOL) 325 MG tablet Take 650 mg by mouth 3 times daily Plus 650 mg bid prn       apixaban ANTICOAGULANT (ELIQUIS) 5 MG tablet Take 1 tablet (5 mg) by mouth 2 times daily 180 tablet 3     atorvastatin (LIPITOR) 40 MG tablet Take 1 tablet (40 mg) by mouth daily 90 tablet 3     calcium carbonate 600 mg-vitamin D 400 " units (CALTRATE) 600-400 MG-UNIT per tablet Take 1 tablet by mouth 2 times daily       clopidogrel (PLAVIX) 75 MG tablet Take 1 tablet (75 mg) by mouth daily 90 tablet 3     digoxin (LANOXIN) 125 MCG tablet Take 1 tablet (125 mcg) by mouth daily 90 tablet 3     furosemide (LASIX) 20 MG tablet Take 1 tablet (20 mg) by mouth daily       gabapentin (NEURONTIN) 300 MG capsule TAKE 2 CAPSULES BY MOUTH EVERY EVENING 180 capsule 0     melatonin 1 MG TABS tablet Take 1 mg by mouth nightly as needed for sleep       metoprolol succinate ER (TOPROL XL) 25 MG 24 hr tablet Take 1 tablet (25 mg) by mouth daily 90 tablet 3     mirtazapine (REMERON) 15 MG tablet TAKE 1 TABLET(15 MG) BY MOUTH AT BEDTIME 30 tablet 0     multivitamin (OCUVITE) TABS Take 1 tablet by mouth 2 times daily        potassium chloride ER (K-TAB/KLOR-CON) 10 MEQ CR tablet Take 1 tablet (10 mEq) by mouth daily 90 tablet 0       ALLERGIES     Allergies   Allergen Reactions     Sulfa Drugs Difficulty breathing and Swelling     Adhesive Tape Blisters     Amiodarone Other (See Comments)     Developed pleural effusion     Penicillins Other (See Comments)     Reaction occurred as a child         Review of Systems:  Skin:  Negative rash   Eyes:  Positive for glasses  ENT:  Negative postnasal drainage;hearing loss  Respiratory:  Positive for dyspnea on exertion;sleep apnea;CPAP  Cardiovascular:  Negative for;palpitations;chest pain;edema Positive for;fatigue;lightheadedness;dizziness  Gastroenterology: Positive for poor appetite  Genitourinary:  Negative urgency  Musculoskeletal:  Positive for joint pain;arthritis;muscular weakness;back pain  Neurologic:  Positive for stroke;incoordination;memory problems;headaches  Psychiatric:  Positive for sleep disturbances;excessive stress;anxiety;depression  Heme/Lymph/Imm:  Positive for allergies  Endocrine:  Negative      Physical Exam:  Vitals: /64 (BP Location: Left arm, Cuff Size: Adult Regular)   Pulse 91   Ht 1.727  "m (5' 8\")   Wt 59.9 kg (132 lb)   SpO2 100%   BMI 20.07 kg/m      Constitutional:  cooperative, alert and oriented, well developed, well nourished, in no acute distress frail      Skin:  warm and dry to the touch, no apparent skin lesions or masses noted        Head:  normocephalic, no masses or lesions        Eyes:  pupils equal and round;conjunctivae and lids unremarkable;sclera white        ENT:  not assessed this visit        Neck:  JVP normal transmitted murmur      Chest:      LLL>RLL crackles    Cardiac: no S3 or S4 irregular rhythm     systolic murmur;late systolic murmur;grade 3 systolic murmur;RUSB   S1, S2 regular with 3/6 ESM LUSB    Abdomen:  BS normoactive;non-tender        Vascular:   pulses below the femoral arteries are diminished                                    Extremities and Back:  no deformities, clubbing, cyanosis, erythema observed;no edema   up to shin    Neurological:  no gross motor deficits;affect appropriate ataxia;limb weakness          PAST MEDICAL HISTORY:  Past Medical History:   Diagnosis Date     Atrial fibrillation (H)     amiodarone therapy discontinued due to pulmonary toxicity     Atrial flutter (H)      Benign essential hypertension 11/20/2018     Cancer (H) vocal cord     Carpal tunnel syndrome     abstracted 7/3/02.     Coronary artery disease involving native coronary artery of native heart without angina pectoris 5/8/2020    Cath 5/28/2020: patent stents; Cath 5/18/2020: ISABEL x4 to RCA; Cath 3/2020: 1 vessel disease; Cath 2017: moderate 2 vessel disease     CVA (cerebral infarction) 5/5/2015     Demyelinating disease of central nervous system, unspecified (H)     abstracted 7/3/02.     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     Pneumonia 8/17/2016     PVD (peripheral vascular disease) (H)      Redundant colon     needs CT colonography     Shingles      SKIN DISORDERS NEC 3/15/2005     Sleep apnea      SVT (supraventricular tachycardia) (H)        PAST SURGICAL HISTORY:  Past Surgical History:   Procedure Laterality Date     BIOPSY  brain 2002     BONE MARROW BIOPSY, BONE SPECIMEN, NEEDLE/TROCAR N/A 6/8/2017    Procedure: BIOPSY BONE MARROW;  UNILATERAL BONE MARROW BIOPSY (CONSCIOUS SEDATION) ;  Surgeon: Jamie Gonzales MD;  Location:  GI     CARDIAC CATHERIZATION  09/05/2017    2V CAD, IFR of RCA 0.95     CV CORONARY ANGIOGRAM N/A 3/23/2020    Procedure: Coronary Angiogram and right heart cath;  Surgeon: Gino Ferrer MD;  Location:  HEART CARDIAC CATH LAB     CV HEART CATHETERIZATION WITH POSSIBLE INTERVENTION N/A 5/28/2020    Procedure: Heart Catheterization with Possible Intervention;  Surgeon: Larry Chawla MD;  Location:  HEART CARDIAC CATH LAB     CV HEART CATHETERIZATION WITH POSSIBLE INTERVENTION N/A 5/18/2020    Procedure: Heart Catheterization with Possible Intervention;  Surgeon: Gaurang Swenson MD;  Location:  HEART CARDIAC CATH LAB     CV INSTANTANEOUS WAVE-FREE RATIO N/A 3/23/2020    Procedure: Instantaneous Wave-Free Ratio;  Surgeon: Gino Ferrer MD;  Location:  HEART CARDIAC CATH LAB     CV PCI ATHERECTOMY ORBITAL N/A 5/18/2020    Procedure: Percutaneous Coronary Intervention Atherectomy Rotational;  Surgeon: Gaurang Swenson MD;  Location:  HEART CARDIAC CATH LAB     CV PCI STENT DRUG ELUTING N/A 5/18/2020    Procedure:  Percutaneous Coronary Intervention Stent Drug Eluting;  Surgeon: Gaurang Swenson MD;  Location:  HEART CARDIAC CATH LAB     CV RIGHT HEART CATH MEASUREMENTS RECORDED N/A 2020    Procedure: Right Heart Cath;  Surgeon: Larry Chawla MD;  Location:  HEART CARDIAC CATH LAB     CV TEMPORARY PACEMAKER INSERTION N/A 2020    Procedure: Temporary Pacemaker Insertion;  Surgeon: Gaurang Swenson MD;  Location:  HEART CARDIAC CATH LAB     ESOPHAGOSCOPY, GASTROSCOPY, DUODENOSCOPY (EGD), COMBINED N/A 2018    Procedure: COMBINED ESOPHAGOSCOPY, GASTROSCOPY, DUODENOSCOPY (EGD), BIOPSY SINGLE OR MULTIPLE;;  Surgeon: Xander Marie MD;  Location:  GI     HEAD & NECK SURGERY       ORTHOPEDIC SURGERY      right arm ulna reset after injury     THORACOSCOPIC WEDGE RESECTION LUNG Right 2016    Procedure: THORACOSCOPIC WEDGE RESECTION LUNG;  Surgeon: Abdelrahman Noriega MD;  Location:  OR     [facility] NONSPECIFIC PROCEDURE  as a child    T & A. abstracted 7/3/02.     ZZC NONSPECIFIC PROCEDURE  early    CTR. abstracted 7/3/02.       FAMILY HISTORY:  Family History   Problem Relation Age of Onset     Blood Disease Mother         Anemia     Cardiovascular Father      Cancer - colorectal Maternal Grandfather      Hypertension Brother      Diabetes Sister 5        Juvinile Diabetes passed at 36     Respiratory Other         Lung Cancer       SOCIAL HISTORY:  Social History     Socioeconomic History     Marital status:      Spouse name: None     Number of children: None     Years of education: None     Highest education level: None   Occupational History     None   Tobacco Use     Smoking status: Former Smoker     Packs/day: 1.00     Years: 30.00     Pack years: 30.00     Types: Cigarettes     Quit date: 2013     Years since quittin.1     Smokeless tobacco: Never Used   Substance and Sexual Activity     Alcohol use: Not Currently     Alcohol/week: 42.0 standard drinks      Types: 42 Standard drinks or equivalent per week     Drug use: No     Sexual activity: Not Currently   Other Topics Concern     Parent/sibling w/ CABG, MI or angioplasty before 65F 55M? Not Asked      Service Not Asked     Blood Transfusions Not Asked     Caffeine Concern Yes     Comment: 1 Coke occasionally      Occupational Exposure Not Asked     Hobby Hazards Not Asked     Sleep Concern Not Asked     Stress Concern Not Asked     Weight Concern Not Asked     Special Diet No     Back Care Not Asked     Exercise No     Bike Helmet Not Asked     Seat Belt Not Asked     Self-Exams Not Asked   Social History Narrative    Retired (disability)      Social Determinants of Health     Financial Resource Strain:      Difficulty of Paying Living Expenses:    Food Insecurity:      Worried About Running Out of Food in the Last Year:      Ran Out of Food in the Last Year:    Transportation Needs:      Lack of Transportation (Medical):      Lack of Transportation (Non-Medical):    Physical Activity:      Days of Exercise per Week:      Minutes of Exercise per Session:    Stress:      Feeling of Stress :    Social Connections:      Frequency of Communication with Friends and Family:      Frequency of Social Gatherings with Friends and Family:      Attends Alevism Services:      Active Member of Clubs or Organizations:      Attends Club or Organization Meetings:      Marital Status:    Intimate Partner Violence:      Fear of Current or Ex-Partner:      Emotionally Abused:      Physically Abused:      Sexually Abused:

## 2021-09-30 ENCOUNTER — DOCUMENTATION ONLY (OUTPATIENT)
Dept: CARDIOLOGY | Facility: CLINIC | Age: 76
End: 2021-09-30

## 2021-09-30 ENCOUNTER — OFFICE VISIT (OUTPATIENT)
Dept: CARDIOLOGY | Facility: CLINIC | Age: 76
End: 2021-09-30
Attending: PHYSICIAN ASSISTANT
Payer: MEDICARE

## 2021-09-30 ENCOUNTER — LAB (OUTPATIENT)
Dept: LAB | Facility: CLINIC | Age: 76
End: 2021-09-30
Payer: MEDICARE

## 2021-09-30 VITALS
SYSTOLIC BLOOD PRESSURE: 108 MMHG | BODY MASS INDEX: 20 KG/M2 | OXYGEN SATURATION: 100 % | DIASTOLIC BLOOD PRESSURE: 64 MMHG | WEIGHT: 132 LBS | HEIGHT: 68 IN | HEART RATE: 91 BPM

## 2021-09-30 DIAGNOSIS — I35.0 AORTIC VALVE STENOSIS, ETIOLOGY OF CARDIAC VALVE DISEASE UNSPECIFIED: ICD-10-CM

## 2021-09-30 DIAGNOSIS — I25.10 CORONARY ARTERY DISEASE INVOLVING NATIVE CORONARY ARTERY OF NATIVE HEART WITHOUT ANGINA PECTORIS: ICD-10-CM

## 2021-09-30 DIAGNOSIS — I50.32 CHRONIC DIASTOLIC CONGESTIVE HEART FAILURE (H): ICD-10-CM

## 2021-09-30 LAB
ANION GAP SERPL CALCULATED.3IONS-SCNC: 3 MMOL/L (ref 3–14)
BUN SERPL-MCNC: 23 MG/DL (ref 7–30)
CALCIUM SERPL-MCNC: 8.5 MG/DL (ref 8.5–10.1)
CHLORIDE BLD-SCNC: 106 MMOL/L (ref 94–109)
CO2 SERPL-SCNC: 29 MMOL/L (ref 20–32)
CREAT SERPL-MCNC: 1.06 MG/DL (ref 0.66–1.25)
GFR SERPL CREATININE-BSD FRML MDRD: 67 ML/MIN/1.73M2
GLUCOSE BLD-MCNC: 90 MG/DL (ref 70–99)
POTASSIUM BLD-SCNC: 3.5 MMOL/L (ref 3.4–5.3)
SODIUM SERPL-SCNC: 138 MMOL/L (ref 133–144)

## 2021-09-30 PROCEDURE — 99214 OFFICE O/P EST MOD 30 MIN: CPT | Performed by: PHYSICIAN ASSISTANT

## 2021-09-30 PROCEDURE — 80048 BASIC METABOLIC PNL TOTAL CA: CPT

## 2021-09-30 PROCEDURE — 36415 COLL VENOUS BLD VENIPUNCTURE: CPT

## 2021-09-30 ASSESSMENT — MIFFLIN-ST. JEOR: SCORE: 1293.25

## 2021-09-30 NOTE — LETTER
9/30/2021    Danny Paige MD, MD  [address]  [place] [address]    RE: Efrem Ramos       Dear Colleague,    I had the pleasure of seeing Efrem Ramos in the [facility].    [facility]    I had the pleasure of seeing Alfredo when he and his son Alfredo Mojica came for follow up of fluid overload and severe valvular dz.  This 76 year old sees Dr. Xiong and Dr. Cortes for his history of:     1. Severe low flow, low gradient AoS - echo 6/3/2021 with SEBASTIAN 0.5cm2 with mean gradient 19mmHg  2. CAD - noted during TAVR w/u 5/2020. S/p rotational atherectomy and ISABEL x 4 to prox-distal RCA. Complicated by hemorrhagic shock from LFA bleeding. 4 units of blood transfused.  3. Permanent AFib - rate control with digoxin and lopressor. Eliquis for CHADSVASc 6 (HTN, CVA, CAD, age)  4. Mild-moderate MS, severe TR - on echo 6/3/2021  5. H/o L PCA CVA - 2013  6. MEL - on CPAP  7. HFpEF  8. PVD - has R SFA not amenable to PCI  9. Dyslipidemia   10. MGUS - sees Dr. Hurley    I saw Alfredo 8/2021 at which time we reviewed that nutrition was improving and weight was climbing from calories.  He was using his foot bicycle at least daily, up to 10 minutes and stamina was improving.  HIGH had increased after I reduced his furosemide 20 mg to 10 mg daily d/t asx'c hypotension.    I asked that he increase his Lasix back to 20 mg daily and increase activity, building up pedal bike to 2-3x/day. 1 m follow-up with blood work rec'd.     Interval History:  Alfredo's wife just passed away from PNA, which has been very hard. She required intubation and a Feeding Tube but was unable to come off the ventilator.  It turns out that she'd never reduced the furosemide from 20 to 10 mg daily as I had instructed a few visits ago (d/t hypotension) but instead has remained on the 20 mg.    Alfredo and his son are at a loss with this, given she did all the finances and medications.   "Alfredo Mojica is still encouraging his dad to exercise and eat. He has Boost and Cherry Coke for breakfast, has yogurt and Naked juice for lunch and has a frozen dinner (supa's pie), Boost, pudding or appl sauce for supper.    With his grieving, he's really not had the \"gumption\" to increase exercise but is typically doing 10-15 minutes on the foot pedal bike.    VITALS:  Vitals: /64 (BP Location: Left arm, Cuff Size: Adult Regular)   Pulse 91   Ht 1.727 m (5' 8\")   Wt 59.9 kg (132 lb)   SpO2 100%   BMI 20.07 kg/m      Diagnostic Testing:  Echocardiogram 6/3/2021 - EF 65-70%. Sev dilated RV, mod decreased RVSF. Severe MAC, 2+MR, mild-mod MS (3.7 mmHg @ 84 bpm). 3-4+TR. Sev AoS. Mean gradient 19mmHg with SEBASTIAN 0.59cm2  Angiogram 5/2020 - Patent RCA stents. No obstructive dz elsewhere. Mild pHTN. Mod-sev elevated RH filling pressures (mean RA 14 mmHg). Nl CO. SEBASTIAN 1.1 cm2, mean gradient 23 mmHg  Component      Latest Ref Rng & Units 6/22/2021 7/27/2021 9/30/2021   Sodium      133 - 144 mmol/L 140 139 138   Potassium      3.4 - 5.3 mmol/L 4.0 3.7 3.5   Chloride      94 - 109 mmol/L 109 106 106   Carbon Dioxide      20 - 32 mmol/L 28 29 29   Anion Gap      3 - 14 mmol/L 3 4 3   Glucose      70 - 99 mg/dL 85 90 90   Urea Nitrogen      7 - 30 mg/dL 13 22 23   Creatinine      0.66 - 1.25 mg/dL 0.97 0.97 1.06   GFR Estimate      >60 mL/min/1.73m2 74 74 67   GFR Estimate If Black      >60 mL/min/1.73:m2 86     Calcium      8.5 - 10.1 mg/dL 8.7 8.7 8.5       Plan:  Increase exercise    Assessment/Plan:    1. Severe Aortic Stenosis, low flow    This has been followed over the last few  Years; prolonged recovery from angiogram >1 year ago 5/2020, which resulted in 4 stents placed to RCA but complicated by LFA bleed/hemorrhagic shock/prlonged hospitalization. CP did improve after stents placed, however    Dr. Cortes met with him 6/2021 noting he was a poor candidate for TAVR given his extreme frailty. We've been working on " "building up stamina/calories    Dentist's recommendations for implants/dentures have taken a \"backseat\" given his wife's recent death and he continues to eat just soft food.  Alfredo Mojica is trying to boost his calories with applesauce, Boost, yogurt, etc    PLAN:    Continue current meds    Increase exercise, aiming for 10-15 minutes 3 times a day before he sees me    Spoke with Nasra,  who gave # for Senior Link[employer] Line to help with Alfredo's questions about finances, etc. [phone].     1 m follow-up     2. HFpEF    He's now on Lasix 20 mg daily (had never been decreased to 10 mg on 7/27 d/t minimally sx'c hypotension, they found out)    BMP today is stable    BP OK    Breathing \"OK\"    PLAN:    Continue current meds    3. CAD    As above, prolonged hospitalization following 4 stents placed to RCA, with LFA bleed/hemorrhagic shock/prolonged hospitalization    CP had improved following stent implantation    Remains on BB, statin, Plavix.     PLAN:    Continue current meds        4. Permanent AFib    Remains on proper dose of Eliquis given age/weight/Cr criteria. CHADSVASc 6 (HTN, CVA, CAD, age)    Rate has been under good control on low dose metoprolol XL 25 mg daily and digoxin 125 mcg daily     PLAN:    Continue current meds        Odette Raymond PA-C, MSPAS      Orders Placed This Encounter   Procedures     Follow-Up with Cardiac Advanced Practice Provider     No orders of the defined types were placed in this encounter.    There are no discontinued medications.      Encounter Diagnoses   Name Primary?     Chronic diastolic congestive heart failure (H)      Aortic valve stenosis, etiology of cardiac valve disease unspecified      Coronary artery disease involving native coronary artery of native heart without angina pectoris        CURRENT MEDICATIONS:  Current Outpatient Medications   Medication Sig Dispense Refill     acetaminophen (TYLENOL) 325 MG tablet Take 650 mg by mouth 3 times daily Plus 650 mg " bid prn       apixaban ANTICOAGULANT (ELIQUIS) 5 MG tablet Take 1 tablet (5 mg) by mouth 2 times daily 180 tablet 3     atorvastatin (LIPITOR) 40 MG tablet Take 1 tablet (40 mg) by mouth daily 90 tablet 3     calcium carbonate 600 mg-vitamin D 400 units (CALTRATE) 600-400 MG-UNIT per tablet Take 1 tablet by mouth 2 times daily       clopidogrel (PLAVIX) 75 MG tablet Take 1 tablet (75 mg) by mouth daily 90 tablet 3     digoxin (LANOXIN) 125 MCG tablet Take 1 tablet (125 mcg) by mouth daily 90 tablet 3     furosemide (LASIX) 20 MG tablet Take 1 tablet (20 mg) by mouth daily       gabapentin (NEURONTIN) 300 MG capsule TAKE 2 CAPSULES BY MOUTH EVERY EVENING 180 capsule 0     melatonin 1 MG TABS tablet Take 1 mg by mouth nightly as needed for sleep       metoprolol succinate ER (TOPROL XL) 25 MG 24 hr tablet Take 1 tablet (25 mg) by mouth daily 90 tablet 3     mirtazapine (REMERON) 15 MG tablet TAKE 1 TABLET(15 MG) BY MOUTH AT BEDTIME 30 tablet 0     multivitamin (OCUVITE) TABS Take 1 tablet by mouth 2 times daily        potassium chloride ER (K-TAB/KLOR-CON) 10 MEQ CR tablet Take 1 tablet (10 mEq) by mouth daily 90 tablet 0       ALLERGIES     Allergies   Allergen Reactions     Sulfa Drugs Difficulty breathing and Swelling     Adhesive Tape Blisters     Amiodarone Other (See Comments)     Developed pleural effusion     Penicillins Other (See Comments)     Reaction occurred as a child         Review of Systems:  Skin:  Negative rash   Eyes:  Positive for glasses  ENT:  Negative postnasal drainage;hearing loss  Respiratory:  Positive for dyspnea on exertion;sleep apnea;CPAP  Cardiovascular:  Negative for;palpitations;chest pain;edema Positive for;fatigue;lightheadedness;dizziness  Gastroenterology: Positive for poor appetite  Genitourinary:  Negative urgency  Musculoskeletal:  Positive for joint pain;arthritis;muscular weakness;back pain  Neurologic:  Positive for stroke;incoordination;memory  "problems;headaches  Psychiatric:  Positive for sleep disturbances;excessive stress;anxiety;depression  Heme/Lymph/Imm:  Positive for allergies  Endocrine:  Negative      Physical Exam:  Vitals: /64 (BP Location: Left arm, Cuff Size: Adult Regular)   Pulse 91   Ht 1.727 m (5' 8\")   Wt 59.9 kg (132 lb)   SpO2 100%   BMI 20.07 kg/m      Constitutional:  cooperative, alert and oriented, well developed, well nourished, in no acute distress frail      Skin:  warm and dry to the touch, no apparent skin lesions or masses noted        Head:  normocephalic, no masses or lesions        Eyes:  pupils equal and round;conjunctivae and lids unremarkable;sclera white        ENT:  not assessed this visit        Neck:  JVP normal transmitted murmur      Chest:      LLL>RLL crackles    Cardiac: no S3 or S4 irregular rhythm     systolic murmur;late systolic murmur;grade 3 systolic murmur;RUSB   S1, S2 regular with 3/6 ESM LUSB    Abdomen:  BS normoactive;non-tender        Vascular:   pulses below the femoral arteries are diminished                                    Extremities and Back:  no deformities, clubbing, cyanosis, erythema observed;no edema   up to shin    Neurological:  no gross motor deficits;affect appropriate ataxia;limb weakness          PAST MEDICAL HISTORY:  Past Medical History:   Diagnosis Date     Atrial fibrillation (H)     amiodarone therapy discontinued due to pulmonary toxicity     Atrial flutter (H)      Benign essential hypertension 11/20/2018     Cancer (H) vocal cord     Carpal tunnel syndrome     abstracted 7/3/02.     Coronary artery disease involving native coronary artery of native heart without angina pectoris 5/8/2020    Cath 5/28/2020: patent stents; Cath 5/18/2020: ISABEL x4 to RCA; Cath 3/2020: 1 vessel disease; Cath 2017: moderate 2 vessel disease     CVA (cerebral infarction) 5/5/2015     Demyelinating disease of central nervous system, unspecified (H)     abstracted 7/3/02.     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     Pneumonia 8/17/2016     PVD (peripheral vascular disease) (H)      Redundant colon     needs CT colonography     Shingles      SKIN DISORDERS NEC 3/15/2005     Sleep apnea      SVT (supraventricular tachycardia) (H)        PAST SURGICAL HISTORY:  Past Surgical History:   Procedure Laterality Date     BIOPSY  brain 2002     BONE MARROW BIOPSY, BONE SPECIMEN, NEEDLE/TROCAR N/A 6/8/2017    Procedure: BIOPSY BONE MARROW;  UNILATERAL BONE MARROW BIOPSY (CONSCIOUS SEDATION) ;  Surgeon: Jamie Gonzales MD;  Location:  GI     CARDIAC CATHERIZATION  09/05/2017    2V CAD, IFR of RCA 0.95     CV CORONARY ANGIOGRAM N/A 3/23/2020    Procedure: Coronary Angiogram and right heart cath;  Surgeon: Gino Ferrer MD;  Location:  HEART CARDIAC CATH LAB     CV HEART CATHETERIZATION WITH POSSIBLE INTERVENTION N/A 5/28/2020    Procedure: Heart Catheterization with Possible Intervention;  Surgeon: Larry Chawla MD;  Location:  HEART CARDIAC CATH LAB     CV HEART CATHETERIZATION WITH POSSIBLE INTERVENTION N/A 5/18/2020    Procedure: Heart Catheterization with Possible Intervention;  Surgeon: Gaurang Swenson MD;  Location:  HEART CARDIAC CATH LAB     CV INSTANTANEOUS WAVE-FREE RATIO N/A 3/23/2020    Procedure: Instantaneous Wave-Free Ratio;  Surgeon: Gino Ferrer MD;  Location: [facility]  CARDIAC CATH LAB     CV PCI ATHERECTOMY ORBITAL N/A 5/18/2020    Procedure: Percutaneous Coronary Intervention Atherectomy Rotational;  Surgeon: Gaurang Swenson MD;  Location:  HEART CARDIAC CATH LAB     CV PCI STENT DRUG ELUTING N/A 5/18/2020    Procedure: Percutaneous Coronary Intervention Stent Drug Eluting;  Surgeon: Gaurang Swenson MD;  Location:  HEART CARDIAC CATH LAB     CV RIGHT HEART CATH MEASUREMENTS RECORDED N/A 5/28/2020    Procedure: Right Heart Cath;  Surgeon: Larry Chawla MD;  Location:  HEART CARDIAC CATH LAB     CV TEMPORARY PACEMAKER INSERTION N/A 5/18/2020    Procedure: Temporary Pacemaker Insertion;  Surgeon: Gaurang Swenson MD;  Location:  HEART CARDIAC CATH LAB     ESOPHAGOSCOPY, GASTROSCOPY, DUODENOSCOPY (EGD), COMBINED N/A 9/8/2018    Procedure: COMBINED ESOPHAGOSCOPY, GASTROSCOPY, DUODENOSCOPY (EGD), BIOPSY SINGLE OR MULTIPLE;;  Surgeon: Xander Marie MD;  Location:  GI     HEAD & NECK SURGERY       ORTHOPEDIC SURGERY      right arm ulna reset after injury     THORACOSCOPIC WEDGE RESECTION LUNG Right 8/2/2016    Procedure: THORACOSCOPIC WEDGE RESECTION LUNG;  Surgeon: Abdelrahman Noriega MD;  Location:  OR     [facility] NONSPECIFIC PROCEDURE  as a child    T & A. abstracted 7/3/02.     ZZC NONSPECIFIC PROCEDURE  early    CTR. abstracted 7/3/02.       FAMILY HISTORY:  Family History   Problem Relation Age of Onset     Blood Disease Mother         Anemia     Cardiovascular Father      Cancer - colorectal Maternal Grandfather      Hypertension Brother      Diabetes Sister 5        Juvinile Diabetes passed at 36     Respiratory Other         Lung Cancer       SOCIAL HISTORY:  Social History     Socioeconomic History     Marital status:      Spouse name: None     Number of children: None     Years of education: None     Highest education level: None   Occupational History     None   Tobacco Use     Smoking status: Former Smoker      Packs/day: 1.00     Years: 30.00     Pack years: 30.00     Types: Cigarettes     Quit date: 2013     Years since quittin.1     Smokeless tobacco: Never Used   Substance and Sexual Activity     Alcohol use: Not Currently     Alcohol/week: 42.0 standard drinks     Types: 42 Standard drinks or equivalent per week     Drug use: No     Sexual activity: Not Currently   Other Topics Concern     Parent/sibling w/ CABG, MI or angioplasty before 65F 55M? Not Asked      Service Not Asked     Blood Transfusions Not Asked     Caffeine Concern Yes     Comment: 1 Coke occasionally      Occupational Exposure Not Asked     Hobby Hazards Not Asked     Sleep Concern Not Asked     Stress Concern Not Asked     Weight Concern Not Asked     Special Diet No     Back Care Not Asked     Exercise No     Bike Helmet Not Asked     Seat Belt Not Asked     Self-Exams Not Asked   Social History Narrative    Retired (disability)      Social Determinants of Health     Financial Resource Strain:      Difficulty of Paying Living Expenses:    Food Insecurity:      Worried About Running Out of Food in the Last Year:      Ran Out of Food in the Last Year:    Transportation Needs:      Lack of Transportation (Medical):      Lack of Transportation (Non-Medical):    Physical Activity:      Days of Exercise per Week:      Minutes of Exercise per Session:    Stress:      Feeling of Stress :    Social Connections:      Frequency of Communication with Friends and Family:      Frequency of Social Gatherings with Friends and Family:      Attends Yazidi Services:      Active Member of Clubs or Organizations:      Attends Club or Organization Meetings:      Marital Status:    Intimate Partner Violence:      Fear of Current or Ex-Partner:      Emotionally Abused:      Physically Abused:      Sexually Abused:            Thank you for allowing me to participate in the care of your patient.      Sincerely,     Cherise Raymond,  WILL     [facility]  cc:   Cherise Raymond PA-C  [address]  [place] [id number]

## 2021-09-30 NOTE — PROGRESS NOTES
Pls call Alfredo's son Alfredo Mojica this afternoon or Friday - Alfredo's wife recently  and they are floundering with the financial/banking issues - she had taken care of all of that.    Pls let Alfredo Mojica know that  at hospital gave # for [facility] [employer] ([phone]) ... they help with ALL aspects of Senior Care (community support, financial help, etc)    Marley Pino

## 2021-09-30 NOTE — PROGRESS NOTES
21 Msg recd from Odette ADAMS  Pls call Alfredo's son Alfredo Mojica this afternoon or Friday - Alfredo's wife recently  and they are floundering with the financial/banking issues - she had taken care of all of that.     Pls let Alfredo Mojica know that  at hospital gave # for [facility] [employer] ([phone]) ... they help with ALL aspects of Senior Care (community support, financial help, etc)    Spoke w pts son Alfredo and explained resource. Gave him the phone # and he said he will reach out to them.  Julien 430 pm

## 2021-09-30 NOTE — PATIENT INSTRUCTIONS
Alfredo - it was good to see you and your son today!    1. Reviewed your sad news about your wife  2. Blood work today showed normal electrolytes and kidneys.  This isn't surprising given that you're still on the same dose of furosemide (water pill)    PLAN:  1. Increase exercise - get up to 10-15 minutes THREE TIMES A DAY. Start with doing before or after breakfast and then before or after dinner.   After a few weeks, add a lunch work out!!    2. Boost the calories. Continue Boost & Naked Juice supplements, soft foods (try to steam veggies). Add an extra yogurt to breakfast    3. See me 1 month

## 2021-10-14 DIAGNOSIS — F32.A DEPRESSION, UNSPECIFIED DEPRESSION TYPE: ICD-10-CM

## 2021-10-18 DIAGNOSIS — I35.0 AORTIC VALVE STENOSIS, ETIOLOGY OF CARDIAC VALVE DISEASE UNSPECIFIED: ICD-10-CM

## 2021-10-18 DIAGNOSIS — I50.32 CHRONIC DIASTOLIC CONGESTIVE HEART FAILURE (H): ICD-10-CM

## 2021-10-18 DIAGNOSIS — I25.10 CORONARY ARTERY DISEASE INVOLVING NATIVE CORONARY ARTERY OF NATIVE HEART WITHOUT ANGINA PECTORIS: ICD-10-CM

## 2021-10-18 RX ORDER — FUROSEMIDE 20 MG
20 TABLET ORAL DAILY
Qty: 90 TABLET | Refills: 3 | Status: SHIPPED | OUTPATIENT
Start: 2021-10-18 | End: 2021-10-29

## 2021-10-18 RX ORDER — MIRTAZAPINE 15 MG/1
TABLET, FILM COATED ORAL
Qty: 30 TABLET | Refills: 0 | Status: SHIPPED | OUTPATIENT
Start: 2021-10-18 | End: 2021-10-26

## 2021-10-18 NOTE — TELEPHONE ENCOUNTER
Routing refill request to provider for review/approval because:  Labs not current:   Sent mychart to complete new PHQ 9  PHQ-9 and GAD7 Scores  10/1/2018   4/2/2020    PHQ-9 Total Score 24 3       Last office vist: 4/2/2020    Pended 90 day supply & 0 refills. Please Review.    Next office visit: 10/26/2021    Michelle Marino RN  [facility]

## 2021-10-24 ENCOUNTER — HEALTH MAINTENANCE LETTER (OUTPATIENT)
Age: 76
End: 2021-10-24

## 2021-10-26 ENCOUNTER — OFFICE VISIT (OUTPATIENT)
Dept: FAMILY MEDICINE | Facility: CLINIC | Age: 76
End: 2021-10-26
Payer: MEDICARE

## 2021-10-26 VITALS
DIASTOLIC BLOOD PRESSURE: 78 MMHG | HEART RATE: 88 BPM | TEMPERATURE: 97.1 F | RESPIRATION RATE: 24 BRPM | OXYGEN SATURATION: 100 % | BODY MASS INDEX: 21.06 KG/M2 | SYSTOLIC BLOOD PRESSURE: 110 MMHG | WEIGHT: 138.5 LBS

## 2021-10-26 DIAGNOSIS — I25.10 CORONARY ARTERY DISEASE INVOLVING NATIVE CORONARY ARTERY OF NATIVE HEART WITHOUT ANGINA PECTORIS: ICD-10-CM

## 2021-10-26 DIAGNOSIS — F32.A DEPRESSION, UNSPECIFIED DEPRESSION TYPE: ICD-10-CM

## 2021-10-26 DIAGNOSIS — Z23 NEED FOR PROPHYLACTIC VACCINATION AND INOCULATION AGAINST INFLUENZA: ICD-10-CM

## 2021-10-26 DIAGNOSIS — K52.831 COLLAGENOUS COLITIS: ICD-10-CM

## 2021-10-26 DIAGNOSIS — K52.832 LYMPHOCYTIC COLITIS: ICD-10-CM

## 2021-10-26 DIAGNOSIS — I10 BENIGN ESSENTIAL HYPERTENSION: ICD-10-CM

## 2021-10-26 DIAGNOSIS — Z00.00 ROUTINE GENERAL MEDICAL EXAMINATION AT A HEALTH CARE FACILITY: Primary | ICD-10-CM

## 2021-10-26 DIAGNOSIS — G25.81 RESTLESS LEGS SYNDROME (RLS): ICD-10-CM

## 2021-10-26 DIAGNOSIS — I48.0 PAROXYSMAL ATRIAL FIBRILLATION (H): ICD-10-CM

## 2021-10-26 PROBLEM — F10.20 ALCOHOL DEPENDENCE (H): Status: ACTIVE | Noted: 2021-10-26

## 2021-10-26 LAB
ERYTHROCYTE [DISTWIDTH] IN BLOOD BY AUTOMATED COUNT: 14.6 % (ref 10–15)
HCT VFR BLD AUTO: 35.7 % (ref 40–53)
HGB BLD-MCNC: 11.8 G/DL (ref 13.3–17.7)
MCH RBC QN AUTO: 33 PG (ref 26.5–33)
MCHC RBC AUTO-ENTMCNC: 33.1 G/DL (ref 31.5–36.5)
MCV RBC AUTO: 100 FL (ref 78–100)
PLATELET # BLD AUTO: 329 10E3/UL (ref 150–450)
RBC # BLD AUTO: 3.58 10E6/UL (ref 4.4–5.9)
WBC # BLD AUTO: 10.6 10E3/UL (ref 4–11)

## 2021-10-26 PROCEDURE — G0008 ADMIN INFLUENZA VIRUS VAC: HCPCS | Performed by: INTERNAL MEDICINE

## 2021-10-26 PROCEDURE — 80053 COMPREHEN METABOLIC PANEL: CPT | Performed by: INTERNAL MEDICINE

## 2021-10-26 PROCEDURE — 85027 COMPLETE CBC AUTOMATED: CPT | Performed by: INTERNAL MEDICINE

## 2021-10-26 PROCEDURE — 36415 COLL VENOUS BLD VENIPUNCTURE: CPT | Performed by: INTERNAL MEDICINE

## 2021-10-26 PROCEDURE — G0439 PPPS, SUBSEQ VISIT: HCPCS | Performed by: INTERNAL MEDICINE

## 2021-10-26 PROCEDURE — 90662 IIV NO PRSV INCREASED AG IM: CPT | Performed by: INTERNAL MEDICINE

## 2021-10-26 PROCEDURE — 80061 LIPID PANEL: CPT | Performed by: INTERNAL MEDICINE

## 2021-10-26 PROCEDURE — 99214 OFFICE O/P EST MOD 30 MIN: CPT | Mod: 25 | Performed by: INTERNAL MEDICINE

## 2021-10-26 RX ORDER — BUDESONIDE 9 MG/1
9 TABLET, FILM COATED, EXTENDED RELEASE ORAL DAILY
Qty: 42 TABLET | Refills: 0 | Status: SHIPPED | OUTPATIENT
Start: 2021-10-26 | End: 2021-11-01

## 2021-10-26 RX ORDER — MIRTAZAPINE 15 MG/1
15 TABLET, FILM COATED ORAL AT BEDTIME
Qty: 90 TABLET | Refills: 3 | Status: ON HOLD | OUTPATIENT
Start: 2021-10-26 | End: 2022-01-01

## 2021-10-26 RX ORDER — BUDESONIDE 3 MG/1
CAPSULE, COATED PELLETS ORAL
Qty: 42 CAPSULE | Refills: 0 | Status: SHIPPED | OUTPATIENT
Start: 2021-10-26 | End: 2021-11-01

## 2021-10-26 RX ORDER — ATORVASTATIN CALCIUM 40 MG/1
40 TABLET, FILM COATED ORAL DAILY
Qty: 90 TABLET | Refills: 3 | Status: CANCELLED | OUTPATIENT
Start: 2021-10-26

## 2021-10-26 RX ORDER — GABAPENTIN 300 MG/1
600 CAPSULE ORAL AT BEDTIME
Qty: 180 CAPSULE | Refills: 3 | Status: SHIPPED | OUTPATIENT
Start: 2021-10-26 | End: 2021-01-01

## 2021-10-26 RX ORDER — METOPROLOL SUCCINATE 25 MG/1
25 TABLET, EXTENDED RELEASE ORAL DAILY
Qty: 90 TABLET | Refills: 3 | Status: ON HOLD | OUTPATIENT
Start: 2021-10-26 | End: 2022-01-01

## 2021-10-26 ASSESSMENT — ENCOUNTER SYMPTOMS
MYALGIAS: 1
PALPITATIONS: 0
ARTHRALGIAS: 1
DYSURIA: 0
JOINT SWELLING: 0
SORE THROAT: 0
HEARTBURN: 0
NAUSEA: 0
SHORTNESS OF BREATH: 1
EYE PAIN: 0
PARESTHESIAS: 0
CHILLS: 1
HEMATURIA: 0
DIZZINESS: 1
COUGH: 1
DIARRHEA: 1
CONSTIPATION: 0
ABDOMINAL PAIN: 0
HEMATOCHEZIA: 0
HEADACHES: 0
NERVOUS/ANXIOUS: 1
FEVER: 0
FREQUENCY: 1
WEAKNESS: 1

## 2021-10-26 ASSESSMENT — PATIENT HEALTH QUESTIONNAIRE - PHQ9
SUM OF ALL RESPONSES TO PHQ QUESTIONS 1-9: 20
10. IF YOU CHECKED OFF ANY PROBLEMS, HOW DIFFICULT HAVE THESE PROBLEMS MADE IT FOR YOU TO DO YOUR WORK, TAKE CARE OF THINGS AT HOME, OR GET ALONG WITH OTHER PEOPLE: VERY DIFFICULT
SUM OF ALL RESPONSES TO PHQ QUESTIONS 1-9: 20

## 2021-10-26 ASSESSMENT — ACTIVITIES OF DAILY LIVING (ADL): CURRENT_FUNCTION: NO ASSISTANCE NEEDED

## 2021-10-26 NOTE — PROGRESS NOTES
"SUBJECTIVE:   Efrem Ramos is a 76 year old male who presents for Preventive Visit.      Patient has been advised of split billing requirements and indicates understanding: Yes   Are you in the first 12 months of your Medicare coverage?  No    Healthy Habits:     In general, how would you rate your overall health?  Poor    Frequency of exercise:  6-7 days/week    Duration of exercise:  Less than 15 minutes    Do you usually eat at least 4 servings of fruit and vegetables a day, include whole grains    & fiber and avoid regularly eating high fat or \"junk\" foods?  No    Taking medications regularly:  Yes    Medication side effects:  None    Ability to successfully perform activities of daily living:  No assistance needed    Home Safety:  No safety concerns identified    Hearing Impairment:  Difficulty following a conversation in a noisy restaurant or crowded room, feel that people are mumbling or not speaking clearly, need to ask people to speak up or repeat themselves, find that men's voices are easier to understand than woman's, difficulty understanding soft or whispered speech and difficulty understanding speech on the telephone    In the past 6 months, have you been bothered by leaking of urine? Yes    In general, how would you rate your overall mental or emotional health?  Fair      PHQ-2 Total Score: 6    Additional concerns today:  No  Answers for HPI/ROS submitted by the patient on 10/26/2021  If you checked off any problems, how difficult have these problems made it for you to do your work, take care of things at home, or get along with other people?: Very difficult  PHQ9 TOTAL SCORE: 20      Do you feel safe in your environment? Yes    Have you ever done Advance Care Planning? (For example, a Health Directive, POLST, or a discussion with a medical provider or your loved ones about your wishes): Yes, advance care planning is on file.       Fall risk  Fallen 2 or more times in the past year?: Yes  Any fall " with injury in the past year?: No      Mini-CogTM Copyright GIOVANI Pena. Licensed by the author for use in [facility]; reprinted with permission ([email or web address]). All rights reserved.      Do you have sleep apnea, excessive snoring or daytime drowsiness?: yes    Reviewed and updated as needed this visit by clinical staff  Tobacco  Allergies  Meds              Reviewed and updated as needed this visit by Provider                Social History     Tobacco Use     Smoking status: Former Smoker     Packs/day: 1.00     Years: 30.00     Pack years: 30.00     Types: Cigarettes     Quit date: 2013     Years since quittin.2     Smokeless tobacco: Never Used   Substance Use Topics     Alcohol use: Not Currently     Alcohol/week: 42.0 standard drinks     Types: 42 Standard drinks or equivalent per week     If you drink alcohol do you typically have >3 drinks per day or >7 drinks per week? Not applicable    Alcohol Use 10/26/2021   Prescreen: >3 drinks/day or >7 drinks/week? Not Applicable       Current providers sharing in care for this patient include:   Patient Care Team:  Danny Paige MD as PCP - General (Internal Medicine)  Bella Chavis MD as MD (Cardiology)  Amparo Antunez PharmD as Pharmacist (Pharmacist)  Cherelle Hurley MD as Assigned Cancer Care Provider  Junior Doan MD as Assigned Surgical Provider  Danny Paige MD as Assigned PCP  Cherise Raymond PA-C as Assigned Heart and Vascular Provider    The following health maintenance items are reviewed in Epic and correct as of today:  Health Maintenance Due   Topic Date Due     HF ACTION PLAN  Never done     ANNUAL REVIEW OF HM ORDERS  Never done     ZOSTER IMMUNIZATION (1 of 2) Never done     MEDICARE ANNUAL WELLNESS VISIT  2008     COLORECTAL CANCER SCREENING  2018     LIPID  2020     FALL RISK ASSESSMENT  2021     DIGOXIN  2021     INFLUENZA VACCINE (1)  "09/01/2021        Coronary artery disease involving native coronary artery of native heart without angina pectoris   Efrem Ramos has been using a pedal machine most days.  No chest pain currently.  He is taking his medications.  His son notes that the atorvastatin does not appear to be being asorbed.    Paroxysmal atrial fibrillation (H)   Doing well with metoprolo, digoxinl for rate control and apixaban  Lymphocytic colitis   Has had chronic loose stools, but notes that more recently the stools have been excessively loose and multiple per day.  He has not had any blood in the stool.  No abdominal pain.  No indigestion.  Normal appetite.  Has not tried any lomotil and imodium.   Benign essential hypertension   Blood pressure is well controlled currently    Review of Systems  Constitutional, HEENT, cardiovascular, pulmonary, GI, , musculoskeletal, neuro, skin, endocrine and psych systems are negative, except as otherwise noted.    OBJECTIVE:   /78   Pulse 88   Temp 97.1  F (36.2  C) (Temporal)   Resp 24   Wt 62.8 kg (138 lb 8 oz)   SpO2 100%   BMI 21.06 kg/m   Estimated body mass index is 21.06 kg/m  as calculated from the following:    Height as of 9/30/21: 1.727 m (5' 8\").    Weight as of this encounter: 62.8 kg (138 lb 8 oz).  Physical Exam  GENERAL: healthy, alert and no distress  EYES: Eyes grossly normal to inspection,  HENT: ear canals and TM's normal,  NECK: no adenopathy, no asymmetry, masses  RESP: lungs clear to auscultation - no rales, rhonchi or wheezes  CV: regular rate and rhythm,  ABDOMEN: soft, nontender, no hepatosplenomegaly,  MS: no gross musculoskeletal defects noted, no edema  SKIN: no suspicious lesions or rashes  NEURO: Normal strength and tone, mentation stable  PSYCH: mentation appears normal, affect normal/bright    Diagnostic Test Results:  Labs reviewed in Epic    ASSESSMENT / PLAN:     Patient Instructions   (Z00.00) Routine general medical examination at [facility]" facility  (primary encounter diagnosis)  Comment: For routine exam, we will draw labs as ordered, cholesterol, diabetes mellitus check, liver function, renal function,   We will also update vaccination history.  Plan: Lipid panel reflex to direct LDL Fasting,         Comprehensive metabolic panel, CBC with         platelets, budesonide (ENTOCORT EC) 3 MG EC         capsule            (I25.10) Coronary artery disease involving native coronary artery of native heart without angina pectoris  Comment: Plan to follow up in cardiology.  Check labs today.  Continue atorvastatin for the time being   Plan:       (I48.0) Paroxysmal atrial fibrillation (H)  Comment: Continue follow up in cardiology, continue diltiazem, metoprolol and apixaban   Plan: metoprolol succinate ER (TOPROL XL) 25 MG 24 hr        tablet            (K52.832) Lymphocytic colitis  Comment: Recommend a trial of budesonide 9 mg daily x 6 weeks, then 6 mg daily x 2 weeks, then 3 mg daily x 2 weeks  Plan:     (I10) Benign essential hypertension  Comment: blood pressure is stable  Plan:     (K52.831) Collagenous colitis  Comment: as above   Plan: budesonide (UCERIS) 9 MG 24 hr tablet            (F32.A) Depression, unspecified depression type  Comment: Continue mirtazapine for now, follow up in 2-3 weeks to review   Plan: mirtazapine (REMERON) 15 MG tablet            (G25.81) Restless legs syndrome (RLS)  Comment: Continue gabapentin 600 mg at night  Plan: gabapentin (NEURONTIN) 300 MG capsule            (Z23) Need for prophylactic vaccination and inoculation against influenza  Comment:   Plan: INFLUENZA, QUAD, HIGH DOSE, PF, 65YR + (FLUZONE        HD), ADMIN INFLUENZA (For MEDICARE Patients         ONLY) []                  Patient has been advised of split billing requirements and indicates understanding: Yes  COUNSELING:  Reviewed preventive health counseling, as reflected in patient instructions    Estimated body mass index is 21.06 kg/m  as calculated  "from the following:    Height as of 9/30/21: 1.727 m (5' 8\").    Weight as of this encounter: 62.8 kg (138 lb 8 oz).        He reports that he quit smoking about 8 years ago. His smoking use included cigarettes. He has a 30.00 pack-year smoking history. He has never used smokeless tobacco.      Appropriate preventive services were discussed with this patient, including applicable screening as appropriate for cardiovascular disease, diabetes, osteopenia/osteoporosis, and glaucoma.  As appropriate for age/gender, discussed screening for colorectal cancer, prostate cancer, breast cancer, and cervical cancer. Checklist reviewing preventive services available has been given to the patient.    Reviewed patients plan of care and provided an AVS. The Basic Care Plan (routine screening as documented in Health Maintenance) for Efrem meets the Care Plan requirement. This Care Plan has been established and reviewed with the Patient.    Counseling Resources:  ATP IV Guidelines  Pooled Cohorts Equation Calculator  Breast Cancer Risk Calculator  Breast Cancer: Medication to Reduce Risk  FRAX Risk Assessment  ICSI Preventive Guidelines  Dietary Guidelines for Americans, 2010  USDA's MyPlate  ASA Prophylaxis  Lung CA Screening    Danny Paige MD, MD  [facility]    Identified Health Risks:  "

## 2021-10-26 NOTE — PATIENT INSTRUCTIONS
(Z00.00) Routine general medical examination at a health care facility  (primary encounter diagnosis)  Comment: For routine exam, we will draw labs as ordered, cholesterol, diabetes mellitus check, liver function, renal function,   We will also update vaccination history.  Plan: Lipid panel reflex to direct LDL Fasting,         Comprehensive metabolic panel, CBC with         platelets, budesonide (ENTOCORT EC) 3 MG EC         capsule            (I25.10) Coronary artery disease involving native coronary artery of native heart without angina pectoris  Comment: Plan to follow up in cardiology.  Check labs today.  Continue atorvastatin for the time being   Plan:       (I48.0) Paroxysmal atrial fibrillation (H)  Comment: Continue follow up in cardiology, continue diltiazem, metoprolol and apixaban   Plan: metoprolol succinate ER (TOPROL XL) 25 MG 24 hr        tablet            (K52.832) Lymphocytic colitis  Comment: Recommend a trial of budesonide 9 mg daily x 6 weeks, then 6 mg daily x 2 weeks, then 3 mg daily x 2 weeks  Plan:     (I10) Benign essential hypertension  Comment: blood pressure is stable  Plan:     (K52.831) Collagenous colitis  Comment: as above   Plan: budesonide (UCERIS) 9 MG 24 hr tablet            (F32.A) Depression, unspecified depression type  Comment: Continue mirtazapine for now, follow up in 2-3 weeks to review   Plan: mirtazapine (REMERON) 15 MG tablet            (G25.81) Restless legs syndrome (RLS)  Comment: Continue gabapentin 600 mg at night  Plan: gabapentin (NEURONTIN) 300 MG capsule            (Z23) Need for prophylactic vaccination and inoculation against influenza  Comment:   Plan: INFLUENZA, QUAD, HIGH DOSE, PF, 65YR + (FLUZONE        HD), ADMIN INFLUENZA (For MEDICARE Patients         ONLY) []

## 2021-10-27 ENCOUNTER — TELEPHONE (OUTPATIENT)
Dept: FAMILY MEDICINE | Facility: CLINIC | Age: 76
End: 2021-10-27

## 2021-10-27 DIAGNOSIS — D64.9 ANEMIA, UNSPECIFIED TYPE: Primary | ICD-10-CM

## 2021-10-27 DIAGNOSIS — E88.09 HYPOALBUMINEMIA: ICD-10-CM

## 2021-10-27 DIAGNOSIS — I10 HTN (HYPERTENSION): ICD-10-CM

## 2021-10-27 LAB
ALBUMIN SERPL-MCNC: 2.8 G/DL (ref 3.4–5)
ALP SERPL-CCNC: 118 U/L (ref 40–150)
ALT SERPL W P-5'-P-CCNC: 25 U/L (ref 0–70)
ANION GAP SERPL CALCULATED.3IONS-SCNC: 6 MMOL/L (ref 3–14)
AST SERPL W P-5'-P-CCNC: 21 U/L (ref 0–45)
BILIRUB SERPL-MCNC: 0.7 MG/DL (ref 0.2–1.3)
BUN SERPL-MCNC: 20 MG/DL (ref 7–30)
CALCIUM SERPL-MCNC: 8.4 MG/DL (ref 8.5–10.1)
CHLORIDE BLD-SCNC: 109 MMOL/L (ref 94–109)
CHOLEST SERPL-MCNC: 92 MG/DL
CO2 SERPL-SCNC: 23 MMOL/L (ref 20–32)
CREAT SERPL-MCNC: 0.88 MG/DL (ref 0.66–1.25)
FASTING STATUS PATIENT QL REPORTED: YES
GFR SERPL CREATININE-BSD FRML MDRD: 82 ML/MIN/1.73M2
GLUCOSE BLD-MCNC: 73 MG/DL (ref 70–99)
HDLC SERPL-MCNC: 45 MG/DL
LDLC SERPL CALC-MCNC: 37 MG/DL
NONHDLC SERPL-MCNC: 47 MG/DL
POTASSIUM BLD-SCNC: 3.7 MMOL/L (ref 3.4–5.3)
PROT SERPL-MCNC: 7.7 G/DL (ref 6.8–8.8)
SODIUM SERPL-SCNC: 138 MMOL/L (ref 133–144)
TRIGL SERPL-MCNC: 52 MG/DL

## 2021-10-27 RX ORDER — POTASSIUM CHLORIDE 750 MG/1
10 TABLET, EXTENDED RELEASE ORAL DAILY
Qty: 90 TABLET | Refills: 3 | Status: SHIPPED | OUTPATIENT
Start: 2021-10-27 | End: 2022-01-01

## 2021-10-27 NOTE — PROGRESS NOTES
"[facility]    I had the pleasure of seeing Alfredo when he and Alfredo Mojica came for follow up of aortic stenosis.  This 76 year old sees Dr. Xiong and Dr. Cortes for his history of:    1. Severe low flow, low gradient AoS - echo 6/3/2021 with SEBASTIAN 0.5cm2 with mean gradient 19mmHg  2. CAD - noted during TAVR w/u 5/2020. S/p rotational atherectomy and ISABEL x 4 to prox-distal RCA. Complicated by hemorrhagic shock from LFA bleeding. 4 units of blood transfused.  3. Permanent AFib - rate control with digoxin and lopressor. Eliquis for CHADSVASc 6 (HTN, CVA, CAD, age)  4. Mild-moderate MS, severe TR - on echo 6/3/2021  5. H/o L PCA CVA - 2013  6. MEL - on CPAP  7. HFpEF  8. PVD - has R SFA not amenable to PCI  9. Dyslipidemia   10. MGUS - sees Dr. Hurley. CBC done 10/2021 wnl    I saw Alfredo 9/30 at which time we reviewed the recent death of his wife from pneumonia. He continued his 10-15 minutes of pedaling the foot bike but lacked the gumption to do much else. Alfredo Mojica was working on improving his dad's nutrition, as Dr. Cortes did not think he'd be a good candidate for TAVR until his frailty improved. They requested close follow-up with me.     Interval History:  Notes increased \"windedness\" over the last month and today I see this on exam as well today. No issues with CP. Remains without orthopnea, PND.     He's switched the pedal bike to sitting step machine, still only going ~10 minutes total for the day. He notes this step machine is giving him a better workout than the pedal bike.    Notes increased dizziness with standing/orthostasis since I saw him last month. Slipped recently when doing the stairs (Alfredo Mojica uses a gait belt for his dad) - confirms no LOC or injury. No palpitations, dizziness associated.      VITALS:  Vitals: /75 (BP Location: Left arm, Patient Position: Sitting, Cuff Size: Adult Regular)   Pulse 84   Ht 1.727 m (5' 8\")   Wt 62.6 kg (138 lb 1.6 oz)   SpO2 99%   BMI 21.00 kg/m  "     Diagnostic Testing:  Echocardiogram 6/3/2021 - EF 65-70%. Sev dilated RV, mod decreased RVSF. Severe MAC, 2+MR, mild-mod MS (3.7 mmHg @ 84 bpm). 3-4+TR. Sev AoS. Mean gradient 19mmHg with SEBASTIAN 0.59cm2  Angiogram 5/2020 - Patent RCA stents. No obstructive dz elsewhere. Mild pHTN. Mod-sev elevated RH filling pressures (mean RA 14 mmHg). Nl CO. SEBASTIAN 1.1 cm2, mean gradient 23 mmHg  Component      Latest Ref Rng & Units 2/16/2021 10/26/2021   WBC      4.0 - 11.0 10e3/uL 10.4 10.6   RBC Count      4.40 - 5.90 10e6/uL 3.83 (L) 3.58 (L)   Hemoglobin      13.3 - 17.7 g/dL 11.8 (L) 11.8 (L)   Hematocrit      40.0 - 53.0 % 37.5 (L) 35.7 (L)   MCV      78 - 100 fL 98 100   MCH      26.5 - 33.0 pg 30.8 33.0   MCHC      31.5 - 36.5 g/dL 31.5 33.1   RDW      10.0 - 15.0 % 15.5 (H) 14.6   Platelet Count      150 - 450 10e3/uL 344 329     Component      Latest Ref Rng & Units 7/27/2021 9/30/2021 10/26/2021   Sodium      133 - 144 mmol/L 139 138 138   Potassium      3.4 - 5.3 mmol/L 3.7 3.5 3.7   Chloride      94 - 109 mmol/L 106 106 109   Carbon Dioxide      20 - 32 mmol/L 29 29 23   Anion Gap      3 - 14 mmol/L 4 3 6   Urea Nitrogen      7 - 30 mg/dL 22 23 20   Creatinine      0.66 - 1.25 mg/dL 0.97 1.06 0.88   Calcium      8.5 - 10.1 mg/dL 8.7 8.5 8.4 (L)   Glucose      70 - 99 mg/dL 90 90 73     Component      Latest Ref Rng & Units 10/26/2021   Alkaline Phosphatase      40 - 150 U/L 118   AST      0 - 45 U/L 21   ALT      0 - 70 U/L 25   Protein Total      6.8 - 8.8 g/dL 7.7   Albumin      3.4 - 5.0 g/dL 2.8 (L)   Bilirubin Total      0.2 - 1.3 mg/dL 0.7   GFR Estimate      >60 mL/min/1.73m2 82       Plan:  1. Increase protein intake and do the urine protein tests Dr. Paige rec'alberto  2. Increase Lasix from 20 mg in AM to 20 mg in AM and 10 mg at noon  3. Will review with Dr. Cortes to determine follow-up for TAVR    Assessment/Plan:    1. Severe Aortic Stenosis, low flow    This has been followed for the last few years. He had  "a prolonged recovery from angiogram that took >1 year 5/2020, resulting in 4 stents placed to RCA but complicated by LFA bleed/hemorrhagic shock/prolonged hospitalization    Dr. Cortes met with him 6/2021, noting he was a poor candidate for TAVR given his extreme frailty. He's been working on building up stamina/calories per Dr. Cortes's recommendations, but I note he has \"backslid\" since his wife's death but is still working on weight gain/improved nutrition and exercise        I had previously talked to them about Palliative Care and Alfredo and his son were not interested at that time in meeting with anyone    PLAN:    Will review with Dr. Cortes to see if any additional testing needed or when follow-up for possible TAVR work up could proceed    2. HFpEF    Remains on Lasix 20 mg daily. BMP on this dose was raina    Breathing is worse today on exam; Alfredo notes \"windedness\"    PLAN:    Increase Lasix from 20 daily to 20 in AM and 10 at noon    Follow-up determined based on TAVR      3. CAD    As above, prolonged hospitalization following 4 stents placed to RCA, with LFA bleed/hemorrhagic shock/prolonged hospitalization    CP had improved following stent implantation    Remains on BB, statin, Plavix.    PLAN:    Hgb stable      4. Permanent AFib    Remains on proper dose of Eliquis given age/weight/Cr criteria. CHADSVASc 6 (HTN, CVA, CAD, age)    Rate has been under good control on low dose metoprolol XL 25 mg daily and digoxin 125 mcg daily     PLAN:    Continue current meds        Odette Raymond PA-C, MSPAS      No orders of the defined types were placed in this encounter.    Orders Placed This Encounter   Medications     furosemide (LASIX) 20 MG tablet     Sig: Take 1 tablet (20 mg) by mouth daily AND 0.5 tablets (10 mg) daily (with lunch).     Dispense:  135 tablet     Refill:  3     Medications Discontinued During This Encounter   Medication Reason     furosemide (LASIX) 20 MG tablet          Encounter Diagnoses   Name " Primary?     Chronic diastolic congestive heart failure (H)      Aortic valve stenosis, etiology of cardiac valve disease unspecified      Coronary artery disease involving native coronary artery of native heart without angina pectoris        CURRENT MEDICATIONS:  Current Outpatient Medications   Medication Sig Dispense Refill     acetaminophen (TYLENOL) 325 MG tablet Take 650 mg by mouth 3 times daily Plus 650 mg bid prn       apixaban ANTICOAGULANT (ELIQUIS) 5 MG tablet Take 1 tablet (5 mg) by mouth 2 times daily 180 tablet 3     atorvastatin (LIPITOR) 40 MG tablet Take 1 tablet (40 mg) by mouth daily 90 tablet 3     budesonide (ENTOCORT EC) 3 MG EC capsule After 6 weeks of 9 mg per day, Take 6 mg per day x 2 weeks, then 3 mg per day x 2 weeks 42 capsule 0     calcium carbonate 600 mg-vitamin D 400 units (CALTRATE) 600-400 MG-UNIT per tablet Take 1 tablet by mouth 2 times daily        clopidogrel (PLAVIX) 75 MG tablet Take 1 tablet (75 mg) by mouth daily 90 tablet 3     digoxin (LANOXIN) 125 MCG tablet Take 1 tablet (125 mcg) by mouth daily 90 tablet 3     furosemide (LASIX) 20 MG tablet Take 1 tablet (20 mg) by mouth daily AND 0.5 tablets (10 mg) daily (with lunch). 135 tablet 3     gabapentin (NEURONTIN) 300 MG capsule Take 2 capsules (600 mg) by mouth At Bedtime 180 capsule 3     melatonin 1 MG TABS tablet Take 1 mg by mouth nightly as needed for sleep       metoprolol succinate ER (TOPROL XL) 25 MG 24 hr tablet Take 1 tablet (25 mg) by mouth daily 90 tablet 3     mirtazapine (REMERON) 15 MG tablet Take 1 tablet (15 mg) by mouth At Bedtime 90 tablet 3     multivitamin (OCUVITE) TABS Take 1 tablet by mouth 2 times daily        potassium chloride ER (K-TAB/KLOR-CON) 10 MEQ CR tablet Take 1 tablet (10 mEq) by mouth daily 90 tablet 3     budesonide (UCERIS) 9 MG 24 hr tablet Take 1 tablet (9 mg) by mouth daily (Patient not taking: Reported on 10/29/2021) 42 tablet 0       ALLERGIES     Allergies   Allergen  "Reactions     Sulfa Drugs Difficulty breathing and Swelling     Adhesive Tape Blisters     Amiodarone Other (See Comments)     Developed pleural effusion     Penicillins Other (See Comments)     Reaction occurred as a child         Review of Systems:  Skin:  Negative rash   Eyes:  Positive for glasses  ENT:  Negative postnasal drainage;hearing loss  Respiratory:  Positive for dyspnea on exertion;sleep apnea;CPAP  Cardiovascular:  Negative for;palpitations;chest pain;edema Positive for;fatigue;lightheadedness;dizziness  Gastroenterology: Positive for poor appetite  Genitourinary:  Negative urgency  Musculoskeletal:  Positive for joint pain;arthritis;muscular weakness;back pain  Neurologic:  Positive for stroke;incoordination;memory problems;headaches  Psychiatric:  Positive for sleep disturbances;excessive stress;anxiety;depression  Heme/Lymph/Imm:  Positive for allergies  Endocrine:  Negative      Physical Exam:  Vitals: /75 (BP Location: Left arm, Patient Position: Sitting, Cuff Size: Adult Regular)   Pulse 84   Ht 1.727 m (5' 8\")   Wt 62.6 kg (138 lb 1.6 oz)   SpO2 99%   BMI 21.00 kg/m      Constitutional:  cooperative, alert and oriented, well developed, well nourished, in no acute distress frail      Skin:  warm and dry to the touch, no apparent skin lesions or masses noted        Head:  normocephalic, no masses or lesions        Eyes:  pupils equal and round;conjunctivae and lids unremarkable;sclera white        ENT:  not assessed this visit        Neck:  JVP normal transmitted murmur      Chest:      LLL>RLL crackles    Cardiac: no S3 or S4 irregular rhythm     systolic murmur;late systolic murmur;grade 3 systolic murmur;RUSB   S1, S2 regular with 3/6 ESM LUSB    Abdomen:  BS normoactive;non-tender        Vascular:   pulses below the femoral arteries are diminished                                    Extremities and Back:  no deformities, clubbing, cyanosis, erythema observed;no edema   up to " shin    Neurological:  no gross motor deficits;affect appropriate ataxia;limb weakness          PAST MEDICAL HISTORY:  Past Medical History:   Diagnosis Date     Atrial fibrillation (H)     amiodarone therapy discontinued due to pulmonary toxicity     Atrial flutter (H)      Benign essential hypertension 11/20/2018     Cancer (H) vocal cord     Carpal tunnel syndrome     abstracted 7/3/02.     Coronary artery disease involving native coronary artery of native heart without angina pectoris 5/8/2020    Cath 5/28/2020: patent stents; Cath 5/18/2020: ISABEL x4 to RCA; Cath 3/2020: 1 vessel disease; Cath 2017: moderate 2 vessel disease     CVA (cerebral infarction) 5/5/2015     Demyelinating disease of central nervous system, unspecified (H)     abstracted 7/3/02.     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     Pneumonia 8/17/2016     PVD (peripheral vascular disease) (H)      Redundant colon     needs CT colonography     Shingles      SKIN DISORDERS NEC 3/15/2005     Sleep apnea      Sleep apnea      SVT (supraventricular tachycardia) (H)        PAST SURGICAL HISTORY:  Past Surgical History:   Procedure Laterality Date     BIOPSY  brain 2002     BONE MARROW BIOPSY, BONE SPECIMEN, NEEDLE/TROCAR N/A 6/8/2017    Procedure: BIOPSY BONE MARROW;  UNILATERAL BONE MARROW BIOPSY (CONSCIOUS SEDATION) ;  Surgeon:  Jamie Gonzales MD;  Location:  GI     CARDIAC CATHERIZATION  09/05/2017    2V CAD, IFR of RCA 0.95     CV CORONARY ANGIOGRAM N/A 3/23/2020    Procedure: Coronary Angiogram and right heart cath;  Surgeon: Gino Ferrer MD;  Location:  HEART CARDIAC CATH LAB     CV HEART CATHETERIZATION WITH POSSIBLE INTERVENTION N/A 5/28/2020    Procedure: Heart Catheterization with Possible Intervention;  Surgeon: Larry Chawla MD;  Location:  HEART CARDIAC CATH LAB     CV HEART CATHETERIZATION WITH POSSIBLE INTERVENTION N/A 5/18/2020    Procedure: Heart Catheterization with Possible Intervention;  Surgeon: Gaurang Swenson MD;  Location:  HEART CARDIAC CATH LAB     CV INSTANTANEOUS WAVE-FREE RATIO N/A 3/23/2020    Procedure: Instantaneous Wave-Free Ratio;  Surgeon: Gino Ferrer MD;  Location:  HEART CARDIAC CATH LAB     CV PCI ATHERECTOMY ORBITAL N/A 5/18/2020    Procedure: Percutaneous Coronary Intervention Atherectomy Rotational;  Surgeon: Gaurang Swenson MD;  Location:  HEART CARDIAC CATH LAB     CV PCI STENT DRUG ELUTING N/A 5/18/2020    Procedure: Percutaneous Coronary Intervention Stent Drug Eluting;  Surgeon: Gaurang Swenson MD;  Location:  HEART CARDIAC CATH LAB     CV RIGHT HEART CATH MEASUREMENTS RECORDED N/A 5/28/2020    Procedure: Right Heart Cath;  Surgeon: Larry Chawla MD;  Location:  HEART CARDIAC CATH LAB     CV TEMPORARY PACEMAKER INSERTION N/A 5/18/2020    Procedure: Temporary Pacemaker Insertion;  Surgeon: Gaurang Swenson MD;  Location:  HEART CARDIAC CATH LAB     ESOPHAGOSCOPY, GASTROSCOPY, DUODENOSCOPY (EGD), COMBINED N/A 9/8/2018    Procedure: COMBINED ESOPHAGOSCOPY, GASTROSCOPY, DUODENOSCOPY (EGD), BIOPSY SINGLE OR MULTIPLE;;  Surgeon: Xander Marie MD;  Location:  GI     HEAD & NECK SURGERY       ORTHOPEDIC SURGERY      right arm ulna reset after injury     THORACOSCOPIC WEDGE RESECTION LUNG Right 8/2/2016     Procedure: THORACOSCOPIC WEDGE RESECTION LUNG;  Surgeon: Abdelrahman Noriega MD;  Location:  OR     ZC NONSPECIFIC PROCEDURE  as a child    T & A. abstracted 7/3/02.     ZZC NONSPECIFIC PROCEDURE  early    CTR. abstracted 7/3/02.       FAMILY HISTORY:  Family History   Problem Relation Age of Onset     Blood Disease Mother         Anemia     Cardiovascular Father      Cancer - colorectal Maternal Grandfather      Hypertension Brother      Diabetes Sister 5        Juvinile Diabetes passed at 36     Respiratory Other         Lung Cancer       SOCIAL HISTORY:  Social History     Socioeconomic History     Marital status:      Spouse name: None     Number of children: None     Years of education: None     Highest education level: None   Occupational History     None   Tobacco Use     Smoking status: Former Smoker     Packs/day: 1.00     Years: 30.00     Pack years: 30.00     Types: Cigarettes     Quit date: 2013     Years since quittin.2     Smokeless tobacco: Never Used   Substance and Sexual Activity     Alcohol use: Not Currently     Alcohol/week: 42.0 standard drinks     Types: 42 Standard drinks or equivalent per week     Drug use: No     Sexual activity: Not Currently   Other Topics Concern     Parent/sibling w/ CABG, MI or angioplasty before 65F 55M? Not Asked      Service Not Asked     Blood Transfusions Not Asked     Caffeine Concern Yes     Comment: 1 Coke occasionally      Occupational Exposure Not Asked     Hobby Hazards Not Asked     Sleep Concern Not Asked     Stress Concern Not Asked     Weight Concern Not Asked     Special Diet No     Back Care Not Asked     Exercise No     Bike Helmet Not Asked     Seat Belt Not Asked     Self-Exams Not Asked   Social History Narrative    Retired (disability)      Social Determinants of Health     Financial Resource Strain:      Difficulty of Paying Living Expenses:    Food Insecurity:      Worried About Running Out of Food  in the Last Year:      Ran Out of Food in the Last Year:    Transportation Needs:      Lack of Transportation (Medical):      Lack of Transportation (Non-Medical):    Physical Activity:      Days of Exercise per Week:      Minutes of Exercise per Session:    Stress:      Feeling of Stress :    Social Connections:      Frequency of Communication with Friends and Family:      Frequency of Social Gatherings with Friends and Family:      Attends Islam Services:      Active Member of Clubs or Organizations:      Attends Club or Organization Meetings:      Marital Status:    Intimate Partner Violence:      Fear of Current or Ex-Partner:      Emotionally Abused:      Physically Abused:      Sexually Abused:

## 2021-10-27 NOTE — RESULT ENCOUNTER NOTE
Nik Topete,    I had the opportunity to review your recent labs and a summary of your labs reads as follows:    Your comprehensive metabolic panel showed normal renal function, normal liver function, and normal fasting blood glucose indicating no evidence of diabetes mellitus.  Your fasting lipid panel show  - normal HDL (good) cholesterol -as your goal is greater than 40  - low LDL (bad) cholesterol as your goal is less than 130  - normal triglyceride levels    Your hemoglobin returned again low 11.8 this is fairly stable for you, but I recommend rechecking your hemoglobin and iron studies when have a chance    There was also a finding of a low albumin which is consistent with your previously low albumin.  This is potentially related to nutrition, however we should check a urine albumin level to see if you may be losing protein in the urine.  I can place the order and you can schedule to have this drawn at your convenience      Sincerely,  Danny Paige MD

## 2021-10-27 NOTE — TELEPHONE ENCOUNTER
Can we call Efrem Ramos and let him know that     Nik Topete,    I had the opportunity to review your recent labs and a summary of your labs reads as follows:    Your comprehensive metabolic panel showed normal renal function, normal liver function, and normal fasting blood glucose indicating no evidence of diabetes mellitus.  Your fasting lipid panel show  - normal HDL (good) cholesterol -as your goal is greater than 40  - low LDL (bad) cholesterol as your goal is less than 130  - normal triglyceride levels    Your hemoglobin returned again low 11.8 this is fairly stable for you, but I recommend rechecking your hemoglobin and iron studies when have a chance    There was also a finding of a low albumin which is consistent with your previously low albumin.  This is potentially related to nutrition, however we should check a urine albumin level to see if you may be losing protein in the urine.  I can place the order and you can schedule to have this drawn at your convenience       Sincerely,  Danny Paige MD

## 2021-10-28 ENCOUNTER — TELEPHONE (OUTPATIENT)
Dept: FAMILY MEDICINE | Facility: CLINIC | Age: 76
End: 2021-10-28

## 2021-10-28 DIAGNOSIS — K52.832 LYMPHOCYTIC COLITIS: Primary | ICD-10-CM

## 2021-10-28 NOTE — TELEPHONE ENCOUNTER
PRIOR AUTHORIZATION DENIED    Medication: Budesonide ER 9mg    Denial Date: 10/28/2021    Denial Rationale:         Appeal Information: If provider would like to appeal this decision we will need a detailed letter of medical necessity to start the process. Then re-route this request back to the PA pool.

## 2021-10-28 NOTE — TELEPHONE ENCOUNTER
Prior Authorization Retail Medication Request    Medication/Dose: Budesonide ER 9mg  ICD code (if different than what is on RX):  K52.831  Previously Tried and Failed:  None  Rationale:  Patient with Colitis and starting on trial of Budesonide to see if symptoms resolve.    Insurance Name:  [employer]  Insurance ID:  [id number]      Pharmacy Information (if different than what is on RX)  Name:  [id number]  Phone:  [phone]

## 2021-10-28 NOTE — TELEPHONE ENCOUNTER
Called patient reviewed lab results with patient and patient's son (junior C2C).    NOV 15   2021 02:30 PM - Office Visit  [facility] - Danny Paige MD, MD Sharon Zaragoza RN  [facility]

## 2021-10-28 NOTE — TELEPHONE ENCOUNTER
PA Initiation    Medication: Budesonide ER 9mg  Insurance Company: [employer] Part D - Phone [phone] Fax [phone]  Pharmacy Filling the Rx: [id number] [address] AT [facility]  Filling Pharmacy Phone: [phone]  Filling Pharmacy Fax: [phone]  Start Date: 10/28/2021

## 2021-10-29 ENCOUNTER — OFFICE VISIT (OUTPATIENT)
Dept: CARDIOLOGY | Facility: CLINIC | Age: 76
End: 2021-10-29
Payer: MEDICARE

## 2021-10-29 VITALS
HEIGHT: 68 IN | WEIGHT: 138.1 LBS | SYSTOLIC BLOOD PRESSURE: 107 MMHG | DIASTOLIC BLOOD PRESSURE: 75 MMHG | HEART RATE: 84 BPM | OXYGEN SATURATION: 99 % | BODY MASS INDEX: 20.93 KG/M2

## 2021-10-29 DIAGNOSIS — I25.10 CORONARY ARTERY DISEASE INVOLVING NATIVE CORONARY ARTERY OF NATIVE HEART WITHOUT ANGINA PECTORIS: ICD-10-CM

## 2021-10-29 DIAGNOSIS — I50.32 CHRONIC DIASTOLIC CONGESTIVE HEART FAILURE (H): ICD-10-CM

## 2021-10-29 DIAGNOSIS — I35.0 AORTIC VALVE STENOSIS, ETIOLOGY OF CARDIAC VALVE DISEASE UNSPECIFIED: ICD-10-CM

## 2021-10-29 PROCEDURE — 99214 OFFICE O/P EST MOD 30 MIN: CPT | Performed by: PHYSICIAN ASSISTANT

## 2021-10-29 RX ORDER — FUROSEMIDE 20 MG
TABLET ORAL
Qty: 135 TABLET | Refills: 3
Start: 2021-10-29 | End: 2022-01-01 | Stop reason: DRUGHIGH

## 2021-10-29 ASSESSMENT — MIFFLIN-ST. JEOR: SCORE: 1320.92

## 2021-10-29 NOTE — PATIENT INSTRUCTIONS
Alfredo - it was good to see you and Alfredo today!    1. Reviewed that you've started getting more breathing trouble - I think that this is could be your valve getting worse and causing more trouble breathing    2. Blood work from Dr. Paige's office overall looked good and didn't show a reason for breathing trouble    PLAN:    1. Increase furosemide from 20 mg daily to 20 mg in AM and 10 at noon to help with breathing  2. Follow-up dependent on Dr. Cortes's  [phone]

## 2021-10-29 NOTE — TELEPHONE ENCOUNTER
No need for appeal, is a lower dose of budesonide covered?  He would take 3 tablets of 3 mg if that's covered    Danny Paige MD, MD

## 2021-11-01 NOTE — TELEPHONE ENCOUNTER
Approved medications that patient would need to try before Budesonide would be covered are    Apriso    Mesalamine 0.375 gm    Sulfasalazine    Please advise.    Fitz Yung CMA on 11/1/2021 at 8:23 AM

## 2021-11-01 NOTE — TELEPHONE ENCOUNTER
Rather than prescribed medication, I recommend we consult with [place] [facility]    [facility] [phone]

## 2021-11-02 ENCOUNTER — MYC MEDICAL ADVICE (OUTPATIENT)
Dept: FAMILY MEDICINE | Facility: CLINIC | Age: 76
End: 2021-11-02
Payer: MEDICARE

## 2021-11-02 DIAGNOSIS — Z00.00 ROUTINE GENERAL MEDICAL EXAMINATION AT A HEALTH CARE FACILITY: ICD-10-CM

## 2021-11-02 NOTE — TELEPHONE ENCOUNTER
[employer] message sent to patient in regards to scheduling appointment for consult with [facility]. Referral faxed over to [facility].    Fitz Yung, [facility]

## 2021-11-03 ENCOUNTER — TELEPHONE (OUTPATIENT)
Dept: CARDIOLOGY | Facility: CLINIC | Age: 76
End: 2021-11-03

## 2021-11-03 NOTE — TELEPHONE ENCOUNTER
Per Odette ADAMS, pt would like to reconsider TAVR. Discussed with Dr. Cortes who stated no additional testing was needed, would like to see patient back in clinic.    Telephoned patient, agreeable to plan. Appt date, time and information reviewed.    Bella Dunn RN  [facility]

## 2021-11-10 DIAGNOSIS — I48.20 CHRONIC A-FIB (H): ICD-10-CM

## 2021-11-10 RX ORDER — DIGOXIN 125 MCG
125 TABLET ORAL DAILY
Qty: 90 TABLET | Refills: 3 | Status: ON HOLD | OUTPATIENT
Start: 2021-11-10 | End: 2022-01-01

## 2021-11-11 RX ORDER — BUDESONIDE 3 MG/1
CAPSULE, COATED PELLETS ORAL
Qty: 126 CAPSULE | Refills: 0 | Status: SHIPPED | OUTPATIENT
Start: 2021-11-11 | End: 2022-01-01

## 2021-11-11 NOTE — TELEPHONE ENCOUNTER
Looks like this was a short term medication.  Can Triage please call patient or son to get more information?   Twila Schulte MA

## 2021-11-11 NOTE — TELEPHONE ENCOUNTER
Pt's son Efrem called. [facility] gave his Dad Budesonide delayed released last refill. It cost $6.99. Pts son stated it is working great for his Dad! Pt is out of meds and needs refill ASAP.Pt hasn't taken his doses for today. Pts son can be reached at [phone]. [facility] in [place].

## 2021-11-11 NOTE — TELEPHONE ENCOUNTER
budesonide (ENTOCORT EC) 3 MG EC capsule (Discontinued) 42 capsule 0 10/26/2021 11/1/2021 --   Sig: After 6 weeks of 9 mg per day, Take 6 mg per day x 2 weeks, then 3 mg per day x 2 weeks   Sent to pharmacy as: Budesonide 3 MG Oral Capsule Delayed Release Particles (ENTOCORT EC)   Class: E-Prescribe   Order: [id number]   E-Prescribing Status: Receipt confirmed by pharmacy (10/26/2021  3:55 PM CDT)   E-Cancel Status: Request approved by pharmacy (11/1/2021  6:14 PM CDT)       E-Cancel Status Note: Some or all of Rx dispensed; Last fill:10/26/21       Called patient - son Alfredo answered   He is upset, pt was only given 42 capsules with no refills   He is currently at the 6 weeks of 9mg per day - is OUT and used up 42 capsules     For total script would need:     6 weeks at 9mg/day (3 tabs): 126 capsules  2 weeks at 6mg/day (2 tabs): 28 capsules   2 weeks at 3mg/day (1 tabs): 14 capsules     168 total - 42 already given/used = 126 needed     Pended Rx     Per son this is urgent    Stephan Bassett RN  [facility]

## 2021-11-15 ENCOUNTER — ANCILLARY PROCEDURE (OUTPATIENT)
Dept: GENERAL RADIOLOGY | Facility: CLINIC | Age: 76
End: 2021-11-15
Attending: INTERNAL MEDICINE
Payer: MEDICARE

## 2021-11-15 ENCOUNTER — OFFICE VISIT (OUTPATIENT)
Dept: FAMILY MEDICINE | Facility: CLINIC | Age: 76
End: 2021-11-15
Payer: MEDICARE

## 2021-11-15 VITALS
HEART RATE: 79 BPM | BODY MASS INDEX: 21.74 KG/M2 | WEIGHT: 143 LBS | SYSTOLIC BLOOD PRESSURE: 118 MMHG | TEMPERATURE: 97.7 F | RESPIRATION RATE: 16 BRPM | DIASTOLIC BLOOD PRESSURE: 83 MMHG | OXYGEN SATURATION: 95 %

## 2021-11-15 DIAGNOSIS — I48.91 ATRIAL FIBRILLATION, UNSPECIFIED TYPE (H): ICD-10-CM

## 2021-11-15 DIAGNOSIS — I25.10 CORONARY ARTERY DISEASE INVOLVING NATIVE CORONARY ARTERY OF NATIVE HEART WITHOUT ANGINA PECTORIS: ICD-10-CM

## 2021-11-15 DIAGNOSIS — K59.00 CONSTIPATION, UNSPECIFIED CONSTIPATION TYPE: ICD-10-CM

## 2021-11-15 DIAGNOSIS — Z12.11 ENCOUNTER FOR SCREENING FOR MALIGNANT NEOPLASM OF COLON: ICD-10-CM

## 2021-11-15 DIAGNOSIS — F32.A DEPRESSION, UNSPECIFIED DEPRESSION TYPE: ICD-10-CM

## 2021-11-15 DIAGNOSIS — D64.9 ANEMIA, UNSPECIFIED TYPE: ICD-10-CM

## 2021-11-15 DIAGNOSIS — E88.09 HYPOALBUMINEMIA: ICD-10-CM

## 2021-11-15 DIAGNOSIS — K52.831 COLLAGENOUS COLITIS: Primary | ICD-10-CM

## 2021-11-15 LAB
ERYTHROCYTE [DISTWIDTH] IN BLOOD BY AUTOMATED COUNT: 15.5 % (ref 10–15)
HCT VFR BLD AUTO: 39.7 % (ref 40–53)
HGB BLD-MCNC: 13 G/DL (ref 13.3–17.7)
MCH RBC QN AUTO: 32.8 PG (ref 26.5–33)
MCHC RBC AUTO-ENTMCNC: 32.7 G/DL (ref 31.5–36.5)
MCV RBC AUTO: 100 FL (ref 78–100)
PLATELET # BLD AUTO: 362 10E3/UL (ref 150–450)
RBC # BLD AUTO: 3.96 10E6/UL (ref 4.4–5.9)
WBC # BLD AUTO: 14.1 10E3/UL (ref 4–11)

## 2021-11-15 PROCEDURE — 74019 RADEX ABDOMEN 2 VIEWS: CPT | Performed by: RADIOLOGY

## 2021-11-15 PROCEDURE — 82746 ASSAY OF FOLIC ACID SERUM: CPT | Performed by: INTERNAL MEDICINE

## 2021-11-15 PROCEDURE — 82043 UR ALBUMIN QUANTITATIVE: CPT | Performed by: INTERNAL MEDICINE

## 2021-11-15 PROCEDURE — 80162 ASSAY OF DIGOXIN TOTAL: CPT | Performed by: INTERNAL MEDICINE

## 2021-11-15 PROCEDURE — 82728 ASSAY OF FERRITIN: CPT | Performed by: INTERNAL MEDICINE

## 2021-11-15 PROCEDURE — 85027 COMPLETE CBC AUTOMATED: CPT | Performed by: INTERNAL MEDICINE

## 2021-11-15 PROCEDURE — 36415 COLL VENOUS BLD VENIPUNCTURE: CPT | Performed by: INTERNAL MEDICINE

## 2021-11-15 PROCEDURE — 82607 VITAMIN B-12: CPT | Performed by: INTERNAL MEDICINE

## 2021-11-15 PROCEDURE — 82274 ASSAY TEST FOR BLOOD FECAL: CPT | Performed by: INTERNAL MEDICINE

## 2021-11-15 PROCEDURE — 83550 IRON BINDING TEST: CPT | Performed by: INTERNAL MEDICINE

## 2021-11-15 PROCEDURE — 99214 OFFICE O/P EST MOD 30 MIN: CPT | Performed by: INTERNAL MEDICINE

## 2021-11-15 RX ORDER — AMOXICILLIN 250 MG
2 CAPSULE ORAL 2 TIMES DAILY
Qty: 120 TABLET | Refills: 3 | Status: SHIPPED | OUTPATIENT
Start: 2021-11-15 | End: 2022-01-01 | Stop reason: DRUGHIGH

## 2021-11-15 RX ORDER — BUDESONIDE 3 MG/1
CAPSULE, COATED PELLETS ORAL
Qty: 126 CAPSULE | Refills: 0 | Status: CANCELLED | OUTPATIENT
Start: 2021-11-15

## 2021-11-15 RX ORDER — ATORVASTATIN CALCIUM 40 MG/1
40 TABLET, FILM COATED ORAL DAILY
Qty: 90 TABLET | Refills: 3 | Status: CANCELLED | OUTPATIENT
Start: 2021-11-15

## 2021-11-15 RX ORDER — MIRTAZAPINE 15 MG/1
15 TABLET, FILM COATED ORAL AT BEDTIME
Qty: 90 TABLET | Refills: 3 | Status: CANCELLED | OUTPATIENT
Start: 2021-11-15

## 2021-11-15 NOTE — PROGRESS NOTES
[facility]  CLINIC PROGRESS NOTE    Subjective:     Depression, unspecified depression type   Efrem Ramos returns the clinic today accompanied by his son.  He was started on mirazapine at last office visit 3 weeks ago.  Appetite is improved.  Coronary artery disease involving native coronary artery of native heart without angina pectoris   Still experiencing symptoms of chest pain and shortness of breath.  He continues on metoprolol and statin and aspirin.  Collagenous colitis   He has had improved stools with only one small stool in the past week.  Last stool was described as a tarry pasty stool in the past two weeks.   He has trended in the right direction since our last visit with regards gaining weight.  Has been eating 1-2 boosts per day.  His son notes that the medication he is been prescribed was ultimately accessible through his pharmacy, although there was some change to the formulation to provide a generic version of the budesonide.  He is taking it 3 mg 3 times a day and then will be reducing it to 3 mg twice a day and ultimately 3 mg once a day and a 2-week increment.  He has not seen a gastroenterologist recently, and last colonoscopy was in 2013.      Past medical history, medications, allergies, social history, family history reviewed and updated in EPIC as of 11/15/2021 .    ROS  CONSTITUTIONAL: as above   SKIN: no worrisome rashes, no worrisome moles,   EYES: no acute vision problems or changes  ENT: no ear problems, no mouth problems, no throat problems  RESP: no significant cough, no shortness of breath  CV: no chest pain, no palpitations, no new or worsening peripheral edema  GI: as above   : no frequency, no dysuria, no hematuria  MS: no claudication, no myalgias, no joint aches  PSYCHIATRIC: no changes in mood or affect      Objective:  Vitals  /83 (BP Location: Left arm, Patient Position: Sitting, Cuff Size: Adult Regular)   Pulse 79   Temp 97.7  F (36.5  C) (Temporal)    Resp 16   Wt 64.9 kg (143 lb)   SpO2 95%   BMI 21.74 kg/m    GEN: Alert Oriented x3 NAD  HEENT: Atraumatic, normocephalic, neck supple,   CV: RRR no murmurs or rubs  PULM: CTA no wheezes or crackles  ABD: Soft, nontender nondistended   SKIN: No visible skin lesion or ulcerations  EXT:   no edema bilateral lower extremities  NEURO: Gait and station normal , No focal neurologic deficits  PSYCH: Mood good, affect mood congruent    No images are attached to the encounter.    Recent Results (from the past 240 hour(s))   Fecal colorectal cancer screen (FIT)    Collection Time: 11/15/21  4:01 PM   Result Value Ref Range    Occult Blood Screen FIT Positive (A) Negative   Ferritin    Collection Time: 11/15/21  4:07 PM   Result Value Ref Range    Ferritin 287 26 - 388 ng/mL   Folate    Collection Time: 11/15/21  4:07 PM   Result Value Ref Range    Folic Acid 51.8 >=5.4 ng/mL   Iron & Iron Binding Capacity    Collection Time: 11/15/21  4:07 PM   Result Value Ref Range    Iron 101 35 - 180 ug/dL    Iron Binding Capacity 357 240 - 430 ug/dL    Iron Sat Index 28 15 - 46 %   CBC with platelets    Collection Time: 11/15/21  4:07 PM   Result Value Ref Range    WBC Count 14.1 (H) 4.0 - 11.0 10e3/uL    RBC Count 3.96 (L) 4.40 - 5.90 10e6/uL    Hemoglobin 13.0 (L) 13.3 - 17.7 g/dL    Hematocrit 39.7 (L) 40.0 - 53.0 %     78 - 100 fL    MCH 32.8 26.5 - 33.0 pg    MCHC 32.7 31.5 - 36.5 g/dL    RDW 15.5 (H) 10.0 - 15.0 %    Platelet Count 362 150 - 450 10e3/uL   Vitamin B12    Collection Time: 11/15/21  4:07 PM   Result Value Ref Range    Vitamin B12 744 193 - 986 pg/mL   Digoxin level    Collection Time: 11/15/21  4:07 PM   Result Value Ref Range    Digoxin 1.0   ug/L   Albumin Random Urine Quantitative with Creat Ratio    Collection Time: 11/15/21  4:08 PM   Result Value Ref Range    Creatinine Urine mg/dL 34 mg/dL    Albumin Urine mg/L 7 mg/L    Albumin Urine mg/g Cr 20.59 (H) 0.00 - 17.00 mg/g Cr          Assessment/Plan:  Patient Instructions     (K52.833) Collagenous colitis  (primary encounter diagnosis)  Comment: Recommend abdominal X-ray today and we will plan to consult with gastroenterology.  Prescription for senna sent to pharmacy and OK to take miralax as needed   [address]   [facility] [address]   Phone: [phone]   Plan:     (F32.A) Depression, unspecified depression type  Comment: Continue on depression medications - mirtazapine and we will check back in 3 months  Plan:     (I25.10) Coronary artery disease involving native coronary artery of native heart without angina pectoris  Comment: Contnue on current medications and plan to follow up in cardiology tojmorrow  Plan:        Follow up in 1 month    Disclaimer: This note consists of symbols derived from keyboarding, dictation and/or voice recognition software. As a result, there may be errors in the script that have gone undetected. Please consider this when interpreting information found in this chart.    Danny Paige MD  [phone]

## 2021-11-15 NOTE — PATIENT INSTRUCTIONS
(I27.963) Collagenous colitis  (primary encounter diagnosis)  Comment: Recommend abdominal X-ray today and we will plan to consult with gastroenterology.  Prescription for senna sent to pharmacy and OK to take miralax as needed   [address]   [facility] [address]   Phone: [phone]   Plan:     (F32.A) Depression, unspecified depression type  Comment: Continue on depression medications - mirtazapine and we will check back in 3 months  Plan:     (I25.10) Coronary artery disease involving native coronary artery of native heart without angina pectoris  Comment: Contnue on current medications and plan to follow up in cardiology tojmorrow  Plan:

## 2021-11-16 ENCOUNTER — OFFICE VISIT (OUTPATIENT)
Dept: CARDIOLOGY | Facility: CLINIC | Age: 76
End: 2021-11-16
Payer: MEDICARE

## 2021-11-16 VITALS
HEIGHT: 68 IN | BODY MASS INDEX: 21.22 KG/M2 | SYSTOLIC BLOOD PRESSURE: 122 MMHG | WEIGHT: 140 LBS | DIASTOLIC BLOOD PRESSURE: 82 MMHG | HEART RATE: 86 BPM

## 2021-11-16 DIAGNOSIS — I35.0 AORTIC VALVE STENOSIS, ETIOLOGY OF CARDIAC VALVE DISEASE UNSPECIFIED: ICD-10-CM

## 2021-11-16 DIAGNOSIS — I48.19 PERSISTENT ATRIAL FIBRILLATION (H): ICD-10-CM

## 2021-11-16 DIAGNOSIS — I48.20 CHRONIC A-FIB (H): ICD-10-CM

## 2021-11-16 DIAGNOSIS — I25.10 CORONARY ARTERY DISEASE INVOLVING NATIVE CORONARY ARTERY OF NATIVE HEART WITHOUT ANGINA PECTORIS: ICD-10-CM

## 2021-11-16 DIAGNOSIS — I50.32 CHRONIC DIASTOLIC CONGESTIVE HEART FAILURE (H): ICD-10-CM

## 2021-11-16 LAB
CREAT UR-MCNC: 34 MG/DL
DIGOXIN SERPL-MCNC: 1 UG/L
FERRITIN SERPL-MCNC: 287 NG/ML (ref 26–388)
FOLATE SERPL-MCNC: 51.8 NG/ML
IRON SATN MFR SERPL: 28 % (ref 15–46)
IRON SERPL-MCNC: 101 UG/DL (ref 35–180)
MICROALBUMIN UR-MCNC: 7 MG/L
MICROALBUMIN/CREAT UR: 20.59 MG/G CR (ref 0–17)
TIBC SERPL-MCNC: 357 UG/DL (ref 240–430)
VIT B12 SERPL-MCNC: 744 PG/ML (ref 193–986)

## 2021-11-16 PROCEDURE — 99214 OFFICE O/P EST MOD 30 MIN: CPT | Performed by: INTERNAL MEDICINE

## 2021-11-16 RX ORDER — CLOPIDOGREL BISULFATE 75 MG/1
75 TABLET ORAL DAILY
Qty: 90 TABLET | Refills: 1 | Status: SHIPPED | OUTPATIENT
Start: 2021-11-16 | End: 2022-01-01

## 2021-11-16 ASSESSMENT — MIFFLIN-ST. JEOR: SCORE: 1329.41

## 2021-11-16 NOTE — LETTER
11/16/2021    Danny Paige MD, MD  [address]  [place] [address]    RE: Efrem Ramos       Dear Colleague,    I had the pleasure of seeing Efrem Ramos in the [facility].  HPI and Plan:       Alfredo Ramos is a pleasant 76-year-old comes in with his son for follow-up for consideration of aortic valve replacement.  He has a history of coronary disease significant peripheral arterial disease as low-flow low gradient severe aortic stenosis, history of moderately to severely elevated pulmonary pressures RA pressure of 14 mild pulmonary hypertension.  Is undergone a TAVR CT scan.  He has complaints of back discomfort back pain fatigue.  He would like to have his valve replaced so to allow him to have back surgery as well.    I reviewed his data this is a complex situation.  He is frail he had a hemorrhagic complication from his PCI to his RCA.  He had a prolonged recovery from this.  He lost weight and has gained now 40 pounds since then.  This is a difficult situation I think he certainly represents high risk probably 5 times risk may be 5% risk mortality.  I would not sure if we should do a subclavian approach versus a femoral approach with shockwave.  We need to repeat the echocardiogram reassess pulmonary pressures look at the CT scan for best and possible valvular access.  Is also been started on states type of oral steroid Americana for his colitis.    Today's clinic visit entailed:  Review of the result(s) of each unique test - CT  No LOS data to display   Time spent doing chart review, history and exam, documentation and further activities per the note  Provider  Link to [facility] Help Grid     The level of medical decision making during this visit was of high complexity.      Orders Placed This Encounter   Procedures     Echocardiogram Complete     No orders of the defined types were placed in this encounter.    There are no  discontinued medications.      Encounter Diagnoses   Name Primary?     Aortic valve stenosis, etiology of cardiac valve disease unspecified      Coronary artery disease involving native coronary artery of native heart without angina pectoris      Persistent atrial fibrillation (H)      Chronic a-fib (H)      Chronic diastolic congestive heart failure (H)        CURRENT MEDICATIONS:  Current Outpatient Medications   Medication Sig Dispense Refill     acetaminophen (TYLENOL) 325 MG tablet Take 650 mg by mouth 3 times daily Plus 650 mg bid prn       apixaban ANTICOAGULANT (ELIQUIS) 5 MG tablet Take 1 tablet (5 mg) by mouth 2 times daily 180 tablet 3     atorvastatin (LIPITOR) 40 MG tablet Take 1 tablet (40 mg) by mouth daily 90 tablet 3     budesonide (ENTOCORT EC) 3 MG EC capsule 9 mg daily x 6 weeks, then 6 mg daily x 2 weeks, then 3 mg daily x 2 weeks 126 capsule 0     calcium carbonate 600 mg-vitamin D 400 units (CALTRATE) 600-400 MG-UNIT per tablet Take 1 tablet by mouth 2 times daily        clopidogrel (PLAVIX) 75 MG tablet Take 1 tablet (75 mg) by mouth daily 90 tablet 3     digoxin (LANOXIN) 125 MCG tablet Take 1 tablet (125 mcg) by mouth daily 90 tablet 3     furosemide (LASIX) 20 MG tablet Take 1 tablet (20 mg) by mouth daily AND 0.5 tablets (10 mg) daily (with lunch). 135 tablet 3     gabapentin (NEURONTIN) 300 MG capsule Take 2 capsules (600 mg) by mouth At Bedtime 180 capsule 3     melatonin 1 MG TABS tablet Take 1 mg by mouth nightly as needed for sleep       metoprolol succinate ER (TOPROL XL) 25 MG 24 hr tablet Take 1 tablet (25 mg) by mouth daily 90 tablet 3     mirtazapine (REMERON) 15 MG tablet Take 1 tablet (15 mg) by mouth At Bedtime 90 tablet 3     multivitamin (OCUVITE) TABS Take 1 tablet by mouth 2 times daily        potassium chloride ER (K-TAB/KLOR-CON) 10 MEQ CR tablet Take 1 tablet (10 mEq) by mouth daily 90 tablet 3     senna-docusate (SENOKOT-S/PERICOLACE) 8.6-50 MG tablet Take 2 tablets  by mouth 2 times daily 120 tablet 3       ALLERGIES     Allergies   Allergen Reactions     Sulfa Drugs Difficulty breathing and Swelling     Adhesive Tape Blisters     Amiodarone Other (See Comments)     Developed pleural effusion     Penicillins Other (See Comments)     Reaction occurred as a child       PAST MEDICAL HISTORY:  Past Medical History:   Diagnosis Date     Atrial fibrillation (H)     amiodarone therapy discontinued due to pulmonary toxicity     Atrial flutter (H)      Benign essential hypertension 11/20/2018     Cancer (H) vocal cord     Carpal tunnel syndrome     abstracted 7/3/02.     Coronary artery disease involving native coronary artery of native heart without angina pectoris 5/8/2020    Cath 5/28/2020: patent stents; Cath 5/18/2020: ISABEL x4 to RCA; Cath 3/2020: 1 vessel disease; Cath 2017: moderate 2 vessel disease     CVA (cerebral infarction) 5/5/2015     Demyelinating disease of central nervous system, unspecified (H)     abstracted 7/3/02.     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     Pneumonia 8/17/2016     PVD (peripheral vascular disease) (H)      Redundant colon     needs CT colonography     Shingles      SKIN DISORDERS NEC 3/15/2005     Sleep apnea      Sleep apnea      SVT (supraventricular tachycardia) (H)        PAST SURGICAL HISTORY:  Past Surgical History:   Procedure  Laterality Date     BIOPSY  brain 2002     BONE MARROW BIOPSY, BONE SPECIMEN, NEEDLE/TROCAR N/A 6/8/2017    Procedure: BIOPSY BONE MARROW;  UNILATERAL BONE MARROW BIOPSY (CONSCIOUS SEDATION) ;  Surgeon: Jamie Gonzales MD;  Location:  GI     CARDIAC CATHERIZATION  09/05/2017    2V CAD, IFR of RCA 0.95     CV CORONARY ANGIOGRAM N/A 3/23/2020    Procedure: Coronary Angiogram and right heart cath;  Surgeon: Gino Ferrer MD;  Location:  HEART CARDIAC CATH LAB     CV HEART CATHETERIZATION WITH POSSIBLE INTERVENTION N/A 5/28/2020    Procedure: Heart Catheterization with Possible Intervention;  Surgeon: Larry Chawla MD;  Location:  HEART CARDIAC CATH LAB     CV HEART CATHETERIZATION WITH POSSIBLE INTERVENTION N/A 5/18/2020    Procedure: Heart Catheterization with Possible Intervention;  Surgeon: Gaurang Swenson MD;  Location:  HEART CARDIAC CATH LAB     CV INSTANTANEOUS WAVE-FREE RATIO N/A 3/23/2020    Procedure: Instantaneous Wave-Free Ratio;  Surgeon: Gino Ferrer MD;  Location:  HEART CARDIAC CATH LAB     CV PCI ATHERECTOMY ORBITAL N/A 5/18/2020    Procedure: Percutaneous Coronary Intervention Atherectomy Rotational;  Surgeon: Gaurang Swenson MD;  Location:  HEART CARDIAC CATH LAB     CV PCI STENT DRUG ELUTING N/A 5/18/2020    Procedure: Percutaneous Coronary Intervention Stent Drug Eluting;  Surgeon: Gaurang Swenson MD;  Location:  HEART CARDIAC CATH LAB     CV RIGHT HEART CATH MEASUREMENTS RECORDED N/A 5/28/2020    Procedure: Right Heart Cath;  Surgeon: Larry Chawla MD;  Location:  HEART CARDIAC CATH LAB     CV TEMPORARY PACEMAKER INSERTION N/A 5/18/2020    Procedure: Temporary Pacemaker Insertion;  Surgeon: Gaurang Swenson MD;  Location:  HEART CARDIAC CATH LAB     ESOPHAGOSCOPY, GASTROSCOPY, DUODENOSCOPY (EGD), COMBINED N/A 9/8/2018    Procedure: COMBINED ESOPHAGOSCOPY, GASTROSCOPY, DUODENOSCOPY (EGD), BIOPSY SINGLE OR MULTIPLE;;   Surgeon: Xander Marie MD;  Location:  GI     HEAD & NECK SURGERY       ORTHOPEDIC SURGERY      right arm ulna reset after injury     THORACOSCOPIC WEDGE RESECTION LUNG Right 2016    Procedure: THORACOSCOPIC WEDGE RESECTION LUNG;  Surgeon: Abdelrahman Noriega MD;  Location:  OR     [facility] NONSPECIFIC PROCEDURE  as a child    T & A. abstracted 7/3/02.     ZZC NONSPECIFIC PROCEDURE  early    CTR. abstracted 7/3/02.       FAMILY HISTORY:  Family History   Problem Relation Age of Onset     Blood Disease Mother         Anemia     Cardiovascular Father      Cancer - colorectal Maternal Grandfather      Hypertension Brother      Diabetes Sister 5        Juvinile Diabetes passed at 36     Respiratory Other         Lung Cancer       SOCIAL HISTORY:  Social History     Socioeconomic History     Marital status:      Spouse name: None     Number of children: None     Years of education: None     Highest education level: None   Occupational History     None   Tobacco Use     Smoking status: Former Smoker     Packs/day: 1.00     Years: 30.00     Pack years: 30.00     Types: Cigarettes     Quit date: 2013     Years since quittin.3     Smokeless tobacco: Never Used   Substance and Sexual Activity     Alcohol use: Not Currently     Alcohol/week: 42.0 standard drinks     Types: 42 Standard drinks or equivalent per week     Drug use: No     Sexual activity: Not Currently   Other Topics Concern     Parent/sibling w/ CABG, MI or angioplasty before 65F 55M? Not Asked      Service Not Asked     Blood Transfusions Not Asked     Caffeine Concern Yes     Comment: 1 Coke occasionally      Occupational Exposure Not Asked     Hobby Hazards Not Asked     Sleep Concern Not Asked     Stress Concern Not Asked     Weight Concern Not Asked     Special Diet No     Back Care Not Asked     Exercise No     Bike Helmet Not Asked     Seat Belt Not Asked     Self-Exams Not Asked   Social History Narrative     "Retired (disability)      Social Determinants of Health     Financial Resource Strain: Not on file   Food Insecurity: Not on file   Transportation Needs: Not on file   Physical Activity: Not on file   Stress: Not on file   Social Connections: Not on file   Intimate Partner Violence: Not on file   Housing Stability: Not on file       Review of Systems:  Skin:  Negative rash   Eyes:  Positive for glasses  ENT:  Negative postnasal drainage;hearing loss  Respiratory:  Positive for dyspnea on exertion;sleep apnea;CPAP  Cardiovascular:  Negative for;palpitations;chest pain;edema Positive for;fatigue;lightheadedness;dizziness  Gastroenterology: Positive for poor appetite  Genitourinary:  Negative urgency  Musculoskeletal:  Positive for joint pain;arthritis;muscular weakness;back pain  Neurologic:  Positive for stroke;incoordination;memory problems;headaches  Psychiatric:  Positive for sleep disturbances;excessive stress;anxiety;depression  Heme/Lymph/Imm:  Positive for allergies  Endocrine:  Negative      Physical Exam:  Vitals: /82   Pulse 86   Ht 1.727 m (5' 7.99\")   Wt 63.5 kg (140 lb)   BMI 21.29 kg/m      Constitutional:           Skin:             Head:           Eyes:           Lymph:      ENT:           Neck:           Respiratory:            Cardiac:                                                           GI:           Extremities and Muscular Skeletal:                 Neurological:           Psych:         Recent Lab Results:  LIPID RESULTS:  Lab Results   Component Value Date    CHOL 92 10/26/2021    CHOL 118 06/24/2019    HDL 45 10/26/2021    HDL 51 06/24/2019    LDL 37 10/26/2021    LDL 51 06/24/2019    TRIG 52 10/26/2021    TRIG 80 06/24/2019    CHOLHDLRATIO 2.7 09/04/2015       LIVER ENZYME RESULTS:  Lab Results   Component Value Date    AST 21 10/26/2021    AST 37 02/16/2021    ALT 25 10/26/2021    ALT 52 02/16/2021       CBC RESULTS:  Lab Results   Component Value Date    WBC " 14.1 (H) 11/15/2021    WBC 10.4 02/16/2021    RBC 3.96 (L) 11/15/2021    RBC 3.83 (L) 02/16/2021    HGB 13.0 (L) 11/15/2021    HGB 11.8 (L) 02/16/2021    HCT 39.7 (L) 11/15/2021    HCT 37.5 (L) 02/16/2021     11/15/2021    MCV 98 02/16/2021    MCH 32.8 11/15/2021    MCH 30.8 02/16/2021    MCHC 32.7 11/15/2021    MCHC 31.5 02/16/2021    RDW 15.5 (H) 11/15/2021    RDW 15.5 (H) 02/16/2021     11/15/2021     02/16/2021       BMP RESULTS:  Lab Results   Component Value Date     10/26/2021     06/22/2021    POTASSIUM 3.7 10/26/2021    POTASSIUM 4.0 06/22/2021    CHLORIDE 109 10/26/2021    CHLORIDE 109 06/22/2021    CO2 23 10/26/2021    CO2 28 06/22/2021    ANIONGAP 6 10/26/2021    ANIONGAP 3 06/22/2021    GLC 73 10/26/2021    GLC 85 06/22/2021    BUN 20 10/26/2021    BUN 13 06/22/2021    CR 0.88 10/26/2021    CR 0.97 06/22/2021    GFRESTIMATED 82 10/26/2021    GFRESTIMATED 74 06/22/2021    GFRESTBLACK 86 06/22/2021    ULISSES 8.4 (L) 10/26/2021    ULISSES 8.7 06/22/2021        A1C RESULTS:  Lab Results   Component Value Date    A1C 5.3 09/04/2015       INR RESULTS:  Lab Results   Component Value Date    INR 1.51 (H) 05/18/2020    INR 1.18 (H) 05/18/2020         MILADYS ASH MD   [facility]      cc:   Danny Paige MD  [address]  [place] [address]

## 2021-11-16 NOTE — PROGRESS NOTES
HPI and Plan:       Alfredo Ramos is a pleasant 76-year-old comes in with his son for follow-up for consideration of aortic valve replacement.  He has a history of coronary disease significant peripheral arterial disease as low-flow low gradient severe aortic stenosis, history of moderately to severely elevated pulmonary pressures RA pressure of 14 mild pulmonary hypertension.  Is undergone a TAVR CT scan.  He has complaints of back discomfort back pain fatigue.  He would like to have his valve replaced so to allow him to have back surgery as well.    I reviewed his data this is a complex situation.  He is frail he had a hemorrhagic complication from his PCI to his RCA.  He had a prolonged recovery from this.  He lost weight and has gained now 40 pounds since then.  This is a difficult situation I think he certainly represents high risk probably 5 times risk may be 5% risk mortality.  I would not sure if we should do a subclavian approach versus a femoral approach with shockwave.  We need to repeat the echocardiogram reassess pulmonary pressures look at the CT scan for best and possible valvular access.  Is also been started on states type of oral steroid Americana for his colitis.    Today's clinic visit entailed:  Review of the result(s) of each unique test - CT  No LOS data to display   Time spent doing chart review, history and exam, documentation and further activities per the note  Provider  Link to [facility] Help Grid     The level of medical decision making during this visit was of high complexity.      Orders Placed This Encounter   Procedures     Echocardiogram Complete     No orders of the defined types were placed in this encounter.    There are no discontinued medications.      Encounter Diagnoses   Name Primary?     Aortic valve stenosis, etiology of cardiac valve disease unspecified      Coronary artery disease involving native coronary artery of native heart without angina pectoris      Persistent atrial  fibrillation (H)      Chronic a-fib (H)      Chronic diastolic congestive heart failure (H)        CURRENT MEDICATIONS:  Current Outpatient Medications   Medication Sig Dispense Refill     acetaminophen (TYLENOL) 325 MG tablet Take 650 mg by mouth 3 times daily Plus 650 mg bid prn       apixaban ANTICOAGULANT (ELIQUIS) 5 MG tablet Take 1 tablet (5 mg) by mouth 2 times daily 180 tablet 3     atorvastatin (LIPITOR) 40 MG tablet Take 1 tablet (40 mg) by mouth daily 90 tablet 3     budesonide (ENTOCORT EC) 3 MG EC capsule 9 mg daily x 6 weeks, then 6 mg daily x 2 weeks, then 3 mg daily x 2 weeks 126 capsule 0     calcium carbonate 600 mg-vitamin D 400 units (CALTRATE) 600-400 MG-UNIT per tablet Take 1 tablet by mouth 2 times daily        clopidogrel (PLAVIX) 75 MG tablet Take 1 tablet (75 mg) by mouth daily 90 tablet 3     digoxin (LANOXIN) 125 MCG tablet Take 1 tablet (125 mcg) by mouth daily 90 tablet 3     furosemide (LASIX) 20 MG tablet Take 1 tablet (20 mg) by mouth daily AND 0.5 tablets (10 mg) daily (with lunch). 135 tablet 3     gabapentin (NEURONTIN) 300 MG capsule Take 2 capsules (600 mg) by mouth At Bedtime 180 capsule 3     melatonin 1 MG TABS tablet Take 1 mg by mouth nightly as needed for sleep       metoprolol succinate ER (TOPROL XL) 25 MG 24 hr tablet Take 1 tablet (25 mg) by mouth daily 90 tablet 3     mirtazapine (REMERON) 15 MG tablet Take 1 tablet (15 mg) by mouth At Bedtime 90 tablet 3     multivitamin (OCUVITE) TABS Take 1 tablet by mouth 2 times daily        potassium chloride ER (K-TAB/KLOR-CON) 10 MEQ CR tablet Take 1 tablet (10 mEq) by mouth daily 90 tablet 3     senna-docusate (SENOKOT-S/PERICOLACE) 8.6-50 MG tablet Take 2 tablets by mouth 2 times daily 120 tablet 3       ALLERGIES     Allergies   Allergen Reactions     Sulfa Drugs Difficulty breathing and Swelling     Adhesive Tape Blisters     Amiodarone Other (See Comments)     Developed pleural effusion     Penicillins Other (See  Comments)     Reaction occurred as a child       PAST MEDICAL HISTORY:  Past Medical History:   Diagnosis Date     Atrial fibrillation (H)     amiodarone therapy discontinued due to pulmonary toxicity     Atrial flutter (H)      Benign essential hypertension 11/20/2018     Cancer (H) vocal cord     Carpal tunnel syndrome     abstracted 7/3/02.     Coronary artery disease involving native coronary artery of native heart without angina pectoris 5/8/2020    Cath 5/28/2020: patent stents; Cath 5/18/2020: ISABEL x4 to RCA; Cath 3/2020: 1 vessel disease; Cath 2017: moderate 2 vessel disease     CVA (cerebral infarction) 5/5/2015     Demyelinating disease of central nervous system, unspecified (H)     abstracted 7/3/02.     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     Pneumonia 8/17/2016     PVD (peripheral vascular disease) (H)      Redundant colon     needs CT colonography     Shingles      SKIN DISORDERS NEC 3/15/2005     Sleep apnea      Sleep apnea      SVT (supraventricular tachycardia) (H)        PAST SURGICAL HISTORY:  Past Surgical History:   Procedure Laterality Date     BIOPSY  brain 2002     BONE MARROW BIOPSY, BONE SPECIMEN, NEEDLE/TROCAR N/A 6/8/2017    Procedure: BIOPSY BONE MARROW;  UNILATERAL BONE MARROW BIOPSY (CONSCIOUS SEDATION) ;  Surgeon: Jamie Gonzales MD;  Location:  GI     CARDIAC  CATHERIZATION  09/05/2017    2V CAD, IFR of RCA 0.95     CV CORONARY ANGIOGRAM N/A 3/23/2020    Procedure: Coronary Angiogram and right heart cath;  Surgeon: Gino Ferrer MD;  Location:  HEART CARDIAC CATH LAB     CV HEART CATHETERIZATION WITH POSSIBLE INTERVENTION N/A 5/28/2020    Procedure: Heart Catheterization with Possible Intervention;  Surgeon: Larry Chawla MD;  Location:  HEART CARDIAC CATH LAB     CV HEART CATHETERIZATION WITH POSSIBLE INTERVENTION N/A 5/18/2020    Procedure: Heart Catheterization with Possible Intervention;  Surgeon: Gaurang Swenson MD;  Location:  HEART CARDIAC CATH LAB     CV INSTANTANEOUS WAVE-FREE RATIO N/A 3/23/2020    Procedure: Instantaneous Wave-Free Ratio;  Surgeon: Gino Ferrer MD;  Location:  HEART CARDIAC CATH LAB     CV PCI ATHERECTOMY ORBITAL N/A 5/18/2020    Procedure: Percutaneous Coronary Intervention Atherectomy Rotational;  Surgeon: Gaurang Swenson MD;  Location:  HEART CARDIAC CATH LAB     CV PCI STENT DRUG ELUTING N/A 5/18/2020    Procedure: Percutaneous Coronary Intervention Stent Drug Eluting;  Surgeon: Gaurang Swenson MD;  Location:  HEART CARDIAC CATH LAB     CV RIGHT HEART CATH MEASUREMENTS RECORDED N/A 5/28/2020    Procedure: Right Heart Cath;  Surgeon: Larry Chawla MD;  Location:  HEART CARDIAC CATH LAB     CV TEMPORARY PACEMAKER INSERTION N/A 5/18/2020    Procedure: Temporary Pacemaker Insertion;  Surgeon: Gaurang Swenson MD;  Location:  HEART CARDIAC CATH LAB     ESOPHAGOSCOPY, GASTROSCOPY, DUODENOSCOPY (EGD), COMBINED N/A 9/8/2018    Procedure: COMBINED ESOPHAGOSCOPY, GASTROSCOPY, DUODENOSCOPY (EGD), BIOPSY SINGLE OR MULTIPLE;;  Surgeon: Xander Marie MD;  Location:  GI     HEAD & NECK SURGERY       ORTHOPEDIC SURGERY      right arm ulna reset after injury     THORACOSCOPIC WEDGE RESECTION LUNG Right 8/2/2016    Procedure: THORACOSCOPIC WEDGE RESECTION LUNG;  Surgeon:  Abdelrahman Noriega MD;  Location:  OR     ZZC NONSPECIFIC PROCEDURE  as a child    T & A. abstracted 7/3/02.     ZZC NONSPECIFIC PROCEDURE  early    CTR. abstracted 7/3/02.       FAMILY HISTORY:  Family History   Problem Relation Age of Onset     Blood Disease Mother         Anemia     Cardiovascular Father      Cancer - colorectal Maternal Grandfather      Hypertension Brother      Diabetes Sister 5        Juvinile Diabetes passed at 36     Respiratory Other         Lung Cancer       SOCIAL HISTORY:  Social History     Socioeconomic History     Marital status:      Spouse name: None     Number of children: None     Years of education: None     Highest education level: None   Occupational History     None   Tobacco Use     Smoking status: Former Smoker     Packs/day: 1.00     Years: 30.00     Pack years: 30.00     Types: Cigarettes     Quit date: 2013     Years since quittin.3     Smokeless tobacco: Never Used   Substance and Sexual Activity     Alcohol use: Not Currently     Alcohol/week: 42.0 standard drinks     Types: 42 Standard drinks or equivalent per week     Drug use: No     Sexual activity: Not Currently   Other Topics Concern     Parent/sibling w/ CABG, MI or angioplasty before 65F 55M? Not Asked      Service Not Asked     Blood Transfusions Not Asked     Caffeine Concern Yes     Comment: 1 Coke occasionally      Occupational Exposure Not Asked     Hobby Hazards Not Asked     Sleep Concern Not Asked     Stress Concern Not Asked     Weight Concern Not Asked     Special Diet No     Back Care Not Asked     Exercise No     Bike Helmet Not Asked     Seat Belt Not Asked     Self-Exams Not Asked   Social History Narrative    Retired (disability)      Social Determinants of Health     Financial Resource Strain: Not on file   Food Insecurity: Not on file   Transportation Needs: Not on file   Physical Activity: Not on file   Stress: Not on file   Social Connections: Not  "on file   Intimate Partner Violence: Not on file   Housing Stability: Not on file       Review of Systems:  Skin:  Negative rash   Eyes:  Positive for glasses  ENT:  Negative postnasal drainage;hearing loss  Respiratory:  Positive for dyspnea on exertion;sleep apnea;CPAP  Cardiovascular:  Negative for;palpitations;chest pain;edema Positive for;fatigue;lightheadedness;dizziness  Gastroenterology: Positive for poor appetite  Genitourinary:  Negative urgency  Musculoskeletal:  Positive for joint pain;arthritis;muscular weakness;back pain  Neurologic:  Positive for stroke;incoordination;memory problems;headaches  Psychiatric:  Positive for sleep disturbances;excessive stress;anxiety;depression  Heme/Lymph/Imm:  Positive for allergies  Endocrine:  Negative      Physical Exam:  Vitals: /82   Pulse 86   Ht 1.727 m (5' 7.99\")   Wt 63.5 kg (140 lb)   BMI 21.29 kg/m      Constitutional:           Skin:             Head:           Eyes:           Lymph:      ENT:           Neck:           Respiratory:            Cardiac:                                                           GI:           Extremities and Muscular Skeletal:                 Neurological:           Psych:         Recent Lab Results:  LIPID RESULTS:  Lab Results   Component Value Date    CHOL 92 10/26/2021    CHOL 118 06/24/2019    HDL 45 10/26/2021    HDL 51 06/24/2019    LDL 37 10/26/2021    LDL 51 06/24/2019    TRIG 52 10/26/2021    TRIG 80 06/24/2019    CHOLHDLRATIO 2.7 09/04/2015       LIVER ENZYME RESULTS:  Lab Results   Component Value Date    AST 21 10/26/2021    AST 37 02/16/2021    ALT 25 10/26/2021    ALT 52 02/16/2021       CBC RESULTS:  Lab Results   Component Value Date    WBC 14.1 (H) 11/15/2021    WBC 10.4 02/16/2021    RBC 3.96 (L) 11/15/2021    RBC 3.83 (L) 02/16/2021    HGB 13.0 (L) 11/15/2021    HGB 11.8 (L) 02/16/2021    HCT 39.7 (L) 11/15/2021    HCT 37.5 (L) 02/16/2021     11/15/2021    MCV 98 02/16/2021    MCH 32.8 " 11/15/2021    MCH 30.8 02/16/2021    MCHC 32.7 11/15/2021    MCHC 31.5 02/16/2021    RDW 15.5 (H) 11/15/2021    RDW 15.5 (H) 02/16/2021     11/15/2021     02/16/2021       BMP RESULTS:  Lab Results   Component Value Date     10/26/2021     06/22/2021    POTASSIUM 3.7 10/26/2021    POTASSIUM 4.0 06/22/2021    CHLORIDE 109 10/26/2021    CHLORIDE 109 06/22/2021    CO2 23 10/26/2021    CO2 28 06/22/2021    ANIONGAP 6 10/26/2021    ANIONGAP 3 06/22/2021    GLC 73 10/26/2021    GLC 85 06/22/2021    BUN 20 10/26/2021    BUN 13 06/22/2021    CR 0.88 10/26/2021    CR 0.97 06/22/2021    GFRESTIMATED 82 10/26/2021    GFRESTIMATED 74 06/22/2021    GFRESTBLACK 86 06/22/2021    ULISSES 8.4 (L) 10/26/2021    ULISSES 8.7 06/22/2021        A1C RESULTS:  Lab Results   Component Value Date    A1C 5.3 09/04/2015       INR RESULTS:  Lab Results   Component Value Date    INR 1.51 (H) 05/18/2020    INR 1.18 (H) 05/18/2020           CC  Danny Paige MD  [address]  [place] [address]

## 2021-11-16 NOTE — RESULT ENCOUNTER NOTE
Nik Topete,    I have had the opportunity to review your recent results and an interpretation is as follows:  Your abdominal X-ray shows no significant amount of stool or obstruction.  This is a good finding and we can continue with plan to start stool softeners     Sincerely,  Danny Paige MD

## 2021-11-17 NOTE — RESULT ENCOUNTER NOTE
Nik Topete,    I have had the opportunity to review your recent results and an interpretation is as follows:  Your follow-up hemoglobin looks much improved  In addition your iron studies, vitamin B12 and folic acid level also returned within normal limits  Your urine protein is a bit elevated and we can continue to monitor this    Sincerely,  Danny Paige MD

## 2021-11-20 LAB — HEMOCCULT STL QL IA: POSITIVE

## 2021-11-21 NOTE — RESULT ENCOUNTER NOTE
Nik Topete,    I have had the opportunity to review your recent results and an interpretation is as follows:  Your follow-up for test did return positive which is consistent with collagenous colitis, but also could be seen in other conditions and given the need for recheck in gastroenterology, I would recommend you schedule an appointment to consult with a gastroenterologist to consider colonoscopy    Sincerely,  Danny Paige MD

## 2021-12-07 NOTE — RESULT ENCOUNTER NOTE
Called patient to let him know we would be reviewing his echo and the plan of care at our next TAVR conference.  Msg was left on patient's VM.

## 2021-12-23 NOTE — TELEPHONE ENCOUNTER
Patient was discussed at TAVR conference today.  He has very poor access.  It was decided that he should be re-evaluated by back surgeon, if he/she feels he would benefit from back surgery and the only way he could have this would be to fix his aortic valve then the team felt we could attempt high risk TAVR using transcaval approach.    I spoke with patient's son, Efrem regarding this plan.  He states the last couple of weeks he has become more fatigued and wants to sleep alot.  Son understands the situation.  He has my number and will contact us with any questions.

## 2022-01-01 ENCOUNTER — HEALTH MAINTENANCE LETTER (OUTPATIENT)
Age: 77
End: 2022-01-01

## 2022-01-01 ENCOUNTER — HOME INFUSION (PRE-WILLOW HOME INFUSION) (OUTPATIENT)
Dept: PHARMACY | Facility: CLINIC | Age: 77
End: 2022-01-01

## 2022-01-01 ENCOUNTER — TELEPHONE (OUTPATIENT)
Dept: CARDIOLOGY | Facility: CLINIC | Age: 77
End: 2022-01-01

## 2022-01-01 ENCOUNTER — MEDICAL CORRESPONDENCE (OUTPATIENT)
Dept: HEALTH INFORMATION MANAGEMENT | Facility: CLINIC | Age: 77
End: 2022-01-01

## 2022-01-01 ENCOUNTER — TRANSFERRED RECORDS (OUTPATIENT)
Dept: HEALTH INFORMATION MANAGEMENT | Facility: CLINIC | Age: 77
End: 2022-01-01
Payer: MEDICARE

## 2022-01-01 ENCOUNTER — NURSE TRIAGE (OUTPATIENT)
Dept: FAMILY MEDICINE | Facility: CLINIC | Age: 77
End: 2022-01-01
Payer: MEDICARE

## 2022-01-01 ENCOUNTER — TELEPHONE (OUTPATIENT)
Dept: FAMILY MEDICINE | Facility: CLINIC | Age: 77
End: 2022-01-01

## 2022-01-01 ENCOUNTER — APPOINTMENT (OUTPATIENT)
Dept: INTERVENTIONAL RADIOLOGY/VASCULAR | Facility: CLINIC | Age: 77
End: 2022-01-01
Attending: SURGERY
Payer: MEDICARE

## 2022-01-01 ENCOUNTER — APPOINTMENT (OUTPATIENT)
Dept: MRI IMAGING | Facility: CLINIC | Age: 77
DRG: 193 | End: 2022-01-01
Attending: PSYCHIATRY & NEUROLOGY
Payer: MEDICARE

## 2022-01-01 ENCOUNTER — ONCOLOGY VISIT (OUTPATIENT)
Dept: ONCOLOGY | Facility: CLINIC | Age: 77
End: 2022-01-01
Attending: INTERNAL MEDICINE
Payer: MEDICARE

## 2022-01-01 ENCOUNTER — HOSPITAL ENCOUNTER (INPATIENT)
Facility: CLINIC | Age: 77
LOS: 19 days | Discharge: HOME HEALTH | DRG: 177 | End: 2022-09-24
Attending: EMERGENCY MEDICINE | Admitting: INTERNAL MEDICINE
Payer: MEDICARE

## 2022-01-01 ENCOUNTER — TRANSFERRED RECORDS (OUTPATIENT)
Dept: HEALTH INFORMATION MANAGEMENT | Facility: CLINIC | Age: 77
End: 2022-01-01

## 2022-01-01 ENCOUNTER — APPOINTMENT (OUTPATIENT)
Dept: PHYSICAL THERAPY | Facility: CLINIC | Age: 77
DRG: 177 | End: 2022-01-01
Payer: MEDICARE

## 2022-01-01 ENCOUNTER — TELEPHONE (OUTPATIENT)
Dept: PALLIATIVE MEDICINE | Facility: CLINIC | Age: 77
End: 2022-01-01

## 2022-01-01 ENCOUNTER — APPOINTMENT (OUTPATIENT)
Dept: PHYSICAL THERAPY | Facility: CLINIC | Age: 77
DRG: 193 | End: 2022-01-01
Attending: NURSE PRACTITIONER
Payer: MEDICARE

## 2022-01-01 ENCOUNTER — APPOINTMENT (OUTPATIENT)
Dept: SPEECH THERAPY | Facility: CLINIC | Age: 77
DRG: 193 | End: 2022-01-01
Payer: MEDICARE

## 2022-01-01 ENCOUNTER — TRANSITIONAL CARE UNIT VISIT (OUTPATIENT)
Dept: GERIATRICS | Facility: CLINIC | Age: 77
End: 2022-01-01
Payer: MEDICARE

## 2022-01-01 ENCOUNTER — PREP FOR PROCEDURE (OUTPATIENT)
Dept: SURGERY | Facility: CLINIC | Age: 77
End: 2022-01-01

## 2022-01-01 ENCOUNTER — LAB REQUISITION (OUTPATIENT)
Dept: LAB | Facility: CLINIC | Age: 77
End: 2022-01-01

## 2022-01-01 ENCOUNTER — MYC MEDICAL ADVICE (OUTPATIENT)
Dept: FAMILY MEDICINE | Facility: CLINIC | Age: 77
End: 2022-01-01

## 2022-01-01 ENCOUNTER — APPOINTMENT (OUTPATIENT)
Dept: CARDIOLOGY | Facility: CLINIC | Age: 77
DRG: 177 | End: 2022-01-01
Attending: INTERNAL MEDICINE
Payer: MEDICARE

## 2022-01-01 ENCOUNTER — MYC MEDICAL ADVICE (OUTPATIENT)
Dept: CARDIOLOGY | Facility: CLINIC | Age: 77
End: 2022-01-01

## 2022-01-01 ENCOUNTER — APPOINTMENT (OUTPATIENT)
Dept: CT IMAGING | Facility: CLINIC | Age: 77
DRG: 177 | End: 2022-01-01
Attending: HOSPITALIST
Payer: MEDICARE

## 2022-01-01 ENCOUNTER — APPOINTMENT (OUTPATIENT)
Dept: PHYSICAL THERAPY | Facility: CLINIC | Age: 77
DRG: 177 | End: 2022-01-01
Attending: INTERNAL MEDICINE
Payer: MEDICARE

## 2022-01-01 ENCOUNTER — APPOINTMENT (OUTPATIENT)
Dept: PHYSICAL THERAPY | Facility: CLINIC | Age: 77
DRG: 193 | End: 2022-01-01
Payer: MEDICARE

## 2022-01-01 ENCOUNTER — HOSPITAL ENCOUNTER (INPATIENT)
Facility: CLINIC | Age: 77
LOS: 8 days | Discharge: NURSING, REHAB OR LONG-TERM CARE | DRG: 193 | End: 2022-01-19
Attending: EMERGENCY MEDICINE | Admitting: INTERNAL MEDICINE
Payer: MEDICARE

## 2022-01-01 ENCOUNTER — APPOINTMENT (OUTPATIENT)
Dept: GENERAL RADIOLOGY | Facility: CLINIC | Age: 77
DRG: 177 | End: 2022-01-01
Attending: EMERGENCY MEDICINE
Payer: MEDICARE

## 2022-01-01 ENCOUNTER — APPOINTMENT (OUTPATIENT)
Dept: GENERAL RADIOLOGY | Facility: CLINIC | Age: 77
DRG: 193 | End: 2022-01-01
Attending: INTERNAL MEDICINE
Payer: MEDICARE

## 2022-01-01 ENCOUNTER — DOCUMENTATION ONLY (OUTPATIENT)
Dept: CARDIOLOGY | Facility: CLINIC | Age: 77
End: 2022-01-01

## 2022-01-01 ENCOUNTER — TELEPHONE (OUTPATIENT)
Dept: FAMILY MEDICINE | Facility: CLINIC | Age: 77
End: 2022-01-01
Payer: MEDICARE

## 2022-01-01 ENCOUNTER — APPOINTMENT (OUTPATIENT)
Dept: PHYSICAL THERAPY | Facility: CLINIC | Age: 77
DRG: 177 | End: 2022-01-01
Attending: HOSPITALIST
Payer: MEDICARE

## 2022-01-01 ENCOUNTER — MEDICAL CORRESPONDENCE (OUTPATIENT)
Dept: FAMILY MEDICINE | Facility: CLINIC | Age: 77
End: 2022-01-01

## 2022-01-01 ENCOUNTER — MEDICAL CORRESPONDENCE (OUTPATIENT)
Dept: HEALTH INFORMATION MANAGEMENT | Facility: CLINIC | Age: 77
End: 2022-01-01
Payer: MEDICARE

## 2022-01-01 ENCOUNTER — PATIENT OUTREACH (OUTPATIENT)
Dept: CARE COORDINATION | Facility: CLINIC | Age: 77
End: 2022-01-01

## 2022-01-01 ENCOUNTER — APPOINTMENT (OUTPATIENT)
Dept: GENERAL RADIOLOGY | Facility: CLINIC | Age: 77
DRG: 193 | End: 2022-01-01
Attending: PHYSICIAN ASSISTANT
Payer: MEDICARE

## 2022-01-01 ENCOUNTER — HOSPITAL ENCOUNTER (INPATIENT)
Facility: CLINIC | Age: 77
LOS: 15 days | Discharge: NURSING, REHAB OR LONG-TERM CARE | DRG: 177 | End: 2022-08-05
Attending: EMERGENCY MEDICINE | Admitting: HOSPITALIST
Payer: MEDICARE

## 2022-01-01 ENCOUNTER — MYC MEDICAL ADVICE (OUTPATIENT)
Dept: INTERVENTIONAL RADIOLOGY/VASCULAR | Facility: CLINIC | Age: 77
End: 2022-01-01

## 2022-01-01 ENCOUNTER — ANESTHESIA EVENT (OUTPATIENT)
Dept: GASTROENTEROLOGY | Facility: CLINIC | Age: 77
End: 2022-01-01
Payer: MEDICARE

## 2022-01-01 ENCOUNTER — VIRTUAL VISIT (OUTPATIENT)
Dept: FAMILY MEDICINE | Facility: CLINIC | Age: 77
End: 2022-01-01
Payer: MEDICARE

## 2022-01-01 ENCOUNTER — OFFICE VISIT (OUTPATIENT)
Dept: URGENT CARE | Facility: URGENT CARE | Age: 77
End: 2022-01-01
Payer: MEDICARE

## 2022-01-01 ENCOUNTER — APPOINTMENT (OUTPATIENT)
Dept: SPEECH THERAPY | Facility: CLINIC | Age: 77
DRG: 177 | End: 2022-01-01
Payer: MEDICARE

## 2022-01-01 ENCOUNTER — APPOINTMENT (OUTPATIENT)
Dept: SPEECH THERAPY | Facility: CLINIC | Age: 77
DRG: 177 | End: 2022-01-01
Attending: HOSPITALIST
Payer: MEDICARE

## 2022-01-01 ENCOUNTER — APPOINTMENT (OUTPATIENT)
Dept: INTERVENTIONAL RADIOLOGY/VASCULAR | Facility: CLINIC | Age: 77
DRG: 177 | End: 2022-01-01
Attending: INTERNAL MEDICINE
Payer: MEDICARE

## 2022-01-01 ENCOUNTER — OFFICE VISIT (OUTPATIENT)
Dept: PODIATRY | Facility: CLINIC | Age: 77
End: 2022-01-01
Payer: MEDICARE

## 2022-01-01 ENCOUNTER — TELEPHONE (OUTPATIENT)
Dept: NURSING | Facility: CLINIC | Age: 77
End: 2022-01-01

## 2022-01-01 ENCOUNTER — TELEPHONE (OUTPATIENT)
Dept: PALLIATIVE MEDICINE | Facility: CLINIC | Age: 77
End: 2022-01-01
Payer: MEDICARE

## 2022-01-01 ENCOUNTER — PATIENT OUTREACH (OUTPATIENT)
Dept: CARE COORDINATION | Facility: CLINIC | Age: 77
End: 2022-01-01
Payer: MEDICARE

## 2022-01-01 ENCOUNTER — PATIENT OUTREACH (OUTPATIENT)
Dept: NURSING | Facility: CLINIC | Age: 77
End: 2022-01-01
Payer: MEDICARE

## 2022-01-01 ENCOUNTER — NURSE TRIAGE (OUTPATIENT)
Dept: NURSING | Facility: CLINIC | Age: 77
End: 2022-01-01

## 2022-01-01 ENCOUNTER — APPOINTMENT (OUTPATIENT)
Dept: CT IMAGING | Facility: CLINIC | Age: 77
DRG: 177 | End: 2022-01-01
Attending: EMERGENCY MEDICINE
Payer: MEDICARE

## 2022-01-01 ENCOUNTER — ANESTHESIA (OUTPATIENT)
Dept: SURGERY | Facility: CLINIC | Age: 77
DRG: 177 | End: 2022-01-01
Payer: MEDICARE

## 2022-01-01 ENCOUNTER — APPOINTMENT (OUTPATIENT)
Dept: GENERAL RADIOLOGY | Facility: CLINIC | Age: 77
DRG: 193 | End: 2022-01-01
Attending: EMERGENCY MEDICINE
Payer: MEDICARE

## 2022-01-01 ENCOUNTER — HOSPITAL ENCOUNTER (OUTPATIENT)
Dept: CT IMAGING | Facility: CLINIC | Age: 77
Discharge: HOME | End: 2022-03-10
Attending: INTERNAL MEDICINE | Admitting: INTERNAL MEDICINE
Payer: MEDICARE

## 2022-01-01 ENCOUNTER — LAB (OUTPATIENT)
Dept: INFUSION THERAPY | Facility: CLINIC | Age: 77
End: 2022-01-01
Attending: INTERNAL MEDICINE
Payer: MEDICARE

## 2022-01-01 ENCOUNTER — DOCUMENTATION ONLY (OUTPATIENT)
Dept: OTHER | Facility: CLINIC | Age: 77
End: 2022-01-01

## 2022-01-01 ENCOUNTER — HOSPITAL ENCOUNTER (INPATIENT)
Facility: CLINIC | Age: 77
LOS: 4 days | Discharge: HOME HEALTH | DRG: 177 | End: 2022-11-02
Attending: EMERGENCY MEDICINE | Admitting: INTERNAL MEDICINE
Payer: MEDICARE

## 2022-01-01 ENCOUNTER — APPOINTMENT (OUTPATIENT)
Dept: CT IMAGING | Facility: CLINIC | Age: 77
DRG: 193 | End: 2022-01-01
Attending: INTERNAL MEDICINE
Payer: MEDICARE

## 2022-01-01 ENCOUNTER — HOSPITAL ENCOUNTER (OUTPATIENT)
Facility: CLINIC | Age: 77
Discharge: HOME | End: 2022-02-23
Attending: PATHOLOGY | Admitting: PATHOLOGY
Payer: MEDICARE

## 2022-01-01 ENCOUNTER — APPOINTMENT (OUTPATIENT)
Dept: GENERAL RADIOLOGY | Facility: CLINIC | Age: 77
DRG: 177 | End: 2022-01-01
Attending: INTERNAL MEDICINE
Payer: MEDICARE

## 2022-01-01 ENCOUNTER — NURSE TRIAGE (OUTPATIENT)
Dept: FAMILY MEDICINE | Facility: CLINIC | Age: 77
End: 2022-01-01

## 2022-01-01 ENCOUNTER — VIRTUAL VISIT (OUTPATIENT)
Dept: PHARMACY | Facility: CLINIC | Age: 77
End: 2022-01-01
Payer: COMMERCIAL

## 2022-01-01 ENCOUNTER — TELEPHONE (OUTPATIENT)
Dept: CARE COORDINATION | Facility: CLINIC | Age: 77
End: 2022-01-01

## 2022-01-01 ENCOUNTER — OFFICE VISIT (OUTPATIENT)
Dept: PALLIATIVE MEDICINE | Facility: CLINIC | Age: 77
End: 2022-01-01
Payer: MEDICARE

## 2022-01-01 ENCOUNTER — APPOINTMENT (OUTPATIENT)
Dept: CT IMAGING | Facility: CLINIC | Age: 77
DRG: 193 | End: 2022-01-01
Attending: EMERGENCY MEDICINE
Payer: MEDICARE

## 2022-01-01 ENCOUNTER — TRANSCRIBE ORDERS (OUTPATIENT)
Dept: GERIATRICS | Facility: CLINIC | Age: 77
End: 2022-01-01

## 2022-01-01 ENCOUNTER — HOSPITAL ENCOUNTER (OUTPATIENT)
Dept: GENERAL RADIOLOGY | Facility: CLINIC | Age: 77
Discharge: HOME | End: 2022-02-25
Attending: INTERNAL MEDICINE | Admitting: INTERNAL MEDICINE
Payer: COMMERCIAL

## 2022-01-01 ENCOUNTER — ANESTHESIA EVENT (OUTPATIENT)
Dept: SURGERY | Facility: CLINIC | Age: 77
DRG: 177 | End: 2022-01-01
Payer: MEDICARE

## 2022-01-01 ENCOUNTER — HOSPITAL ENCOUNTER (OUTPATIENT)
Dept: GENERAL RADIOLOGY | Facility: CLINIC | Age: 77
Discharge: HOME | End: 2022-02-28
Attending: INTERNAL MEDICINE | Admitting: INTERNAL MEDICINE
Payer: COMMERCIAL

## 2022-01-01 ENCOUNTER — HOSPITAL ENCOUNTER (OUTPATIENT)
Facility: CLINIC | Age: 77
Discharge: HOME | End: 2022-11-17
Admitting: RADIOLOGY
Payer: MEDICARE

## 2022-01-01 ENCOUNTER — HOSPITAL ENCOUNTER (OUTPATIENT)
Dept: SPEECH THERAPY | Facility: CLINIC | Age: 77
Setting detail: THERAPIES SERIES
End: 2022-02-28
Attending: NURSE PRACTITIONER
Payer: COMMERCIAL

## 2022-01-01 ENCOUNTER — DOCUMENTATION ONLY (OUTPATIENT)
Dept: MEDSURG UNIT | Facility: CLINIC | Age: 77
End: 2022-01-01

## 2022-01-01 ENCOUNTER — DISCHARGE SUMMARY NURSING HOME (OUTPATIENT)
Dept: GERIATRICS | Facility: CLINIC | Age: 77
End: 2022-01-01
Payer: MEDICARE

## 2022-01-01 ENCOUNTER — ANESTHESIA (OUTPATIENT)
Dept: GASTROENTEROLOGY | Facility: CLINIC | Age: 77
End: 2022-01-01
Payer: MEDICARE

## 2022-01-01 ENCOUNTER — APPOINTMENT (OUTPATIENT)
Dept: SPEECH THERAPY | Facility: CLINIC | Age: 77
DRG: 193 | End: 2022-01-01
Attending: INTERNAL MEDICINE
Payer: MEDICARE

## 2022-01-01 ENCOUNTER — TELEPHONE (OUTPATIENT)
Dept: SURGERY | Facility: CLINIC | Age: 77
End: 2022-01-01

## 2022-01-01 VITALS
OXYGEN SATURATION: 93 % | DIASTOLIC BLOOD PRESSURE: 59 MMHG | BODY MASS INDEX: 20.1 KG/M2 | RESPIRATION RATE: 16 BRPM | TEMPERATURE: 97.9 F | WEIGHT: 132.6 LBS | HEIGHT: 68 IN | SYSTOLIC BLOOD PRESSURE: 91 MMHG | HEART RATE: 88 BPM

## 2022-01-01 VITALS
TEMPERATURE: 97.9 F | WEIGHT: 115.96 LBS | SYSTOLIC BLOOD PRESSURE: 107 MMHG | HEART RATE: 89 BPM | BODY MASS INDEX: 17.63 KG/M2 | DIASTOLIC BLOOD PRESSURE: 59 MMHG | OXYGEN SATURATION: 93 % | RESPIRATION RATE: 16 BRPM

## 2022-01-01 VITALS
SYSTOLIC BLOOD PRESSURE: 98 MMHG | BODY MASS INDEX: 20.31 KG/M2 | WEIGHT: 134 LBS | DIASTOLIC BLOOD PRESSURE: 60 MMHG | HEIGHT: 68 IN

## 2022-01-01 VITALS
HEART RATE: 85 BPM | OXYGEN SATURATION: 95 % | TEMPERATURE: 97.8 F | HEIGHT: 68 IN | WEIGHT: 132.2 LBS | DIASTOLIC BLOOD PRESSURE: 64 MMHG | RESPIRATION RATE: 20 BRPM | SYSTOLIC BLOOD PRESSURE: 97 MMHG | BODY MASS INDEX: 20.03 KG/M2

## 2022-01-01 VITALS
BODY MASS INDEX: 22.19 KG/M2 | TEMPERATURE: 98.3 F | RESPIRATION RATE: 18 BRPM | OXYGEN SATURATION: 94 % | DIASTOLIC BLOOD PRESSURE: 55 MMHG | SYSTOLIC BLOOD PRESSURE: 95 MMHG | WEIGHT: 141.4 LBS | HEART RATE: 96 BPM | HEIGHT: 67 IN

## 2022-01-01 VITALS
DIASTOLIC BLOOD PRESSURE: 50 MMHG | BODY MASS INDEX: 20.37 KG/M2 | WEIGHT: 134 LBS | TEMPERATURE: 96.4 F | OXYGEN SATURATION: 98 % | RESPIRATION RATE: 16 BRPM | SYSTOLIC BLOOD PRESSURE: 80 MMHG | HEART RATE: 93 BPM

## 2022-01-01 VITALS
BODY MASS INDEX: 20.29 KG/M2 | HEART RATE: 82 BPM | DIASTOLIC BLOOD PRESSURE: 76 MMHG | RESPIRATION RATE: 16 BRPM | HEIGHT: 68 IN | SYSTOLIC BLOOD PRESSURE: 106 MMHG | WEIGHT: 133.9 LBS | TEMPERATURE: 97.8 F | OXYGEN SATURATION: 96 %

## 2022-01-01 VITALS
TEMPERATURE: 97.3 F | RESPIRATION RATE: 16 BRPM | SYSTOLIC BLOOD PRESSURE: 81 MMHG | HEART RATE: 61 BPM | OXYGEN SATURATION: 90 % | DIASTOLIC BLOOD PRESSURE: 53 MMHG

## 2022-01-01 VITALS
HEART RATE: 75 BPM | RESPIRATION RATE: 18 BRPM | TEMPERATURE: 97.6 F | SYSTOLIC BLOOD PRESSURE: 95 MMHG | OXYGEN SATURATION: 96 % | HEIGHT: 67 IN | WEIGHT: 126.1 LBS | BODY MASS INDEX: 19.79 KG/M2 | DIASTOLIC BLOOD PRESSURE: 65 MMHG

## 2022-01-01 VITALS
RESPIRATION RATE: 18 BRPM | BODY MASS INDEX: 18.58 KG/M2 | SYSTOLIC BLOOD PRESSURE: 102 MMHG | DIASTOLIC BLOOD PRESSURE: 69 MMHG | TEMPERATURE: 98.1 F | WEIGHT: 118.6 LBS | HEART RATE: 97 BPM | OXYGEN SATURATION: 98 %

## 2022-01-01 VITALS
HEART RATE: 78 BPM | DIASTOLIC BLOOD PRESSURE: 66 MMHG | OXYGEN SATURATION: 94 % | BODY MASS INDEX: 19.87 KG/M2 | RESPIRATION RATE: 16 BRPM | TEMPERATURE: 98.5 F | WEIGHT: 131.1 LBS | SYSTOLIC BLOOD PRESSURE: 101 MMHG | HEIGHT: 68 IN

## 2022-01-01 VITALS
DIASTOLIC BLOOD PRESSURE: 71 MMHG | HEIGHT: 68 IN | OXYGEN SATURATION: 94 % | SYSTOLIC BLOOD PRESSURE: 103 MMHG | BODY MASS INDEX: 20.11 KG/M2 | HEART RATE: 85 BPM | TEMPERATURE: 97.4 F | WEIGHT: 132.7 LBS | RESPIRATION RATE: 20 BRPM

## 2022-01-01 VITALS
SYSTOLIC BLOOD PRESSURE: 108 MMHG | BODY MASS INDEX: 15.54 KG/M2 | OXYGEN SATURATION: 95 % | HEIGHT: 67 IN | WEIGHT: 99 LBS | HEART RATE: 119 BPM | RESPIRATION RATE: 16 BRPM | DIASTOLIC BLOOD PRESSURE: 68 MMHG | TEMPERATURE: 97 F

## 2022-01-01 VITALS
HEART RATE: 94 BPM | TEMPERATURE: 97.4 F | HEIGHT: 68 IN | RESPIRATION RATE: 16 BRPM | OXYGEN SATURATION: 97 % | SYSTOLIC BLOOD PRESSURE: 96 MMHG | WEIGHT: 134.4 LBS | DIASTOLIC BLOOD PRESSURE: 65 MMHG | BODY MASS INDEX: 20.37 KG/M2

## 2022-01-01 VITALS
SYSTOLIC BLOOD PRESSURE: 91 MMHG | BODY MASS INDEX: 19.75 KG/M2 | TEMPERATURE: 98.7 F | WEIGHT: 126.1 LBS | HEART RATE: 99 BPM | RESPIRATION RATE: 18 BRPM | DIASTOLIC BLOOD PRESSURE: 55 MMHG | OXYGEN SATURATION: 95 %

## 2022-01-01 VITALS
HEART RATE: 79 BPM | TEMPERATURE: 98.3 F | HEIGHT: 68 IN | RESPIRATION RATE: 16 BRPM | DIASTOLIC BLOOD PRESSURE: 57 MMHG | SYSTOLIC BLOOD PRESSURE: 92 MMHG | WEIGHT: 129.3 LBS | OXYGEN SATURATION: 98 % | BODY MASS INDEX: 19.6 KG/M2

## 2022-01-01 VITALS
DIASTOLIC BLOOD PRESSURE: 62 MMHG | HEIGHT: 68 IN | SYSTOLIC BLOOD PRESSURE: 93 MMHG | BODY MASS INDEX: 19.85 KG/M2 | TEMPERATURE: 97.6 F | HEART RATE: 86 BPM | OXYGEN SATURATION: 95 % | RESPIRATION RATE: 16 BRPM | WEIGHT: 131 LBS

## 2022-01-01 VITALS
DIASTOLIC BLOOD PRESSURE: 73 MMHG | WEIGHT: 104 LBS | TEMPERATURE: 97.4 F | HEART RATE: 82 BPM | RESPIRATION RATE: 16 BRPM | OXYGEN SATURATION: 96 % | SYSTOLIC BLOOD PRESSURE: 114 MMHG | BODY MASS INDEX: 16.29 KG/M2

## 2022-01-01 VITALS
HEIGHT: 68 IN | TEMPERATURE: 97.5 F | DIASTOLIC BLOOD PRESSURE: 74 MMHG | SYSTOLIC BLOOD PRESSURE: 118 MMHG | BODY MASS INDEX: 20.34 KG/M2 | HEART RATE: 90 BPM | WEIGHT: 134.2 LBS | OXYGEN SATURATION: 96 % | RESPIRATION RATE: 18 BRPM

## 2022-01-01 VITALS
TEMPERATURE: 97.5 F | RESPIRATION RATE: 16 BRPM | HEIGHT: 68 IN | SYSTOLIC BLOOD PRESSURE: 102 MMHG | DIASTOLIC BLOOD PRESSURE: 71 MMHG | WEIGHT: 136.6 LBS | OXYGEN SATURATION: 100 % | HEART RATE: 89 BPM | BODY MASS INDEX: 20.7 KG/M2

## 2022-01-01 VITALS
RESPIRATION RATE: 17 BRPM | DIASTOLIC BLOOD PRESSURE: 61 MMHG | BODY MASS INDEX: 20.37 KG/M2 | TEMPERATURE: 98.3 F | WEIGHT: 134 LBS | SYSTOLIC BLOOD PRESSURE: 90 MMHG | OXYGEN SATURATION: 96 % | HEART RATE: 82 BPM

## 2022-01-01 VITALS
TEMPERATURE: 97.6 F | DIASTOLIC BLOOD PRESSURE: 70 MMHG | OXYGEN SATURATION: 93 % | HEIGHT: 68 IN | RESPIRATION RATE: 16 BRPM | BODY MASS INDEX: 20.95 KG/M2 | WEIGHT: 138.2 LBS | SYSTOLIC BLOOD PRESSURE: 102 MMHG | HEART RATE: 80 BPM

## 2022-01-01 VITALS
OXYGEN SATURATION: 97 % | RESPIRATION RATE: 16 BRPM | DIASTOLIC BLOOD PRESSURE: 66 MMHG | HEART RATE: 92 BPM | WEIGHT: 99.21 LBS | TEMPERATURE: 97.4 F | BODY MASS INDEX: 15.57 KG/M2 | SYSTOLIC BLOOD PRESSURE: 103 MMHG | HEIGHT: 67 IN

## 2022-01-01 VITALS
HEIGHT: 67 IN | WEIGHT: 141.4 LBS | DIASTOLIC BLOOD PRESSURE: 57 MMHG | RESPIRATION RATE: 18 BRPM | SYSTOLIC BLOOD PRESSURE: 90 MMHG | OXYGEN SATURATION: 95 % | TEMPERATURE: 96.5 F | BODY MASS INDEX: 22.19 KG/M2 | HEART RATE: 65 BPM

## 2022-01-01 VITALS
BODY MASS INDEX: 20.17 KG/M2 | SYSTOLIC BLOOD PRESSURE: 110 MMHG | OXYGEN SATURATION: 97 % | HEART RATE: 100 BPM | WEIGHT: 132.6 LBS | RESPIRATION RATE: 16 BRPM | DIASTOLIC BLOOD PRESSURE: 60 MMHG

## 2022-01-01 VITALS
SYSTOLIC BLOOD PRESSURE: 89 MMHG | TEMPERATURE: 98.1 F | DIASTOLIC BLOOD PRESSURE: 58 MMHG | OXYGEN SATURATION: 94 % | RESPIRATION RATE: 26 BRPM | HEART RATE: 76 BPM

## 2022-01-01 VITALS — SYSTOLIC BLOOD PRESSURE: 110 MMHG | DIASTOLIC BLOOD PRESSURE: 71 MMHG | HEART RATE: 105 BPM

## 2022-01-01 VITALS
RESPIRATION RATE: 18 BRPM | HEIGHT: 67 IN | TEMPERATURE: 97 F | SYSTOLIC BLOOD PRESSURE: 102 MMHG | DIASTOLIC BLOOD PRESSURE: 72 MMHG | WEIGHT: 126.1 LBS | BODY MASS INDEX: 19.79 KG/M2 | HEART RATE: 78 BPM | OXYGEN SATURATION: 94 %

## 2022-01-01 VITALS
HEART RATE: 101 BPM | HEIGHT: 67 IN | WEIGHT: 126.1 LBS | TEMPERATURE: 98.1 F | RESPIRATION RATE: 18 BRPM | BODY MASS INDEX: 19.79 KG/M2 | DIASTOLIC BLOOD PRESSURE: 61 MMHG | OXYGEN SATURATION: 96 % | SYSTOLIC BLOOD PRESSURE: 94 MMHG

## 2022-01-01 VITALS
OXYGEN SATURATION: 96 % | WEIGHT: 119.1 LBS | DIASTOLIC BLOOD PRESSURE: 64 MMHG | SYSTOLIC BLOOD PRESSURE: 95 MMHG | TEMPERATURE: 98.6 F | RESPIRATION RATE: 18 BRPM | HEART RATE: 104 BPM | BODY MASS INDEX: 18.65 KG/M2

## 2022-01-01 VITALS
RESPIRATION RATE: 16 BRPM | BODY MASS INDEX: 19.6 KG/M2 | TEMPERATURE: 97.5 F | OXYGEN SATURATION: 92 % | HEIGHT: 69 IN | HEART RATE: 87 BPM | WEIGHT: 132.3 LBS | SYSTOLIC BLOOD PRESSURE: 106 MMHG | DIASTOLIC BLOOD PRESSURE: 69 MMHG

## 2022-01-01 VITALS
OXYGEN SATURATION: 98 % | BODY MASS INDEX: 18.19 KG/M2 | WEIGHT: 120 LBS | SYSTOLIC BLOOD PRESSURE: 81 MMHG | RESPIRATION RATE: 16 BRPM | HEART RATE: 85 BPM | DIASTOLIC BLOOD PRESSURE: 60 MMHG | HEIGHT: 68 IN

## 2022-01-01 DIAGNOSIS — D72.829 LEUKOCYTOSIS, UNSPECIFIED TYPE: ICD-10-CM

## 2022-01-01 DIAGNOSIS — I48.20 CHRONIC A-FIB (H): ICD-10-CM

## 2022-01-01 DIAGNOSIS — K94.23 MALFUNCTION OF GASTROSTOMY TUBE (H): Primary | ICD-10-CM

## 2022-01-01 DIAGNOSIS — K52.832 LYMPHOCYTIC COLITIS: ICD-10-CM

## 2022-01-01 DIAGNOSIS — Z71.1 CONCERN ABOUT END OF LIFE: ICD-10-CM

## 2022-01-01 DIAGNOSIS — R52 PAIN: ICD-10-CM

## 2022-01-01 DIAGNOSIS — I50.32 CHRONIC DIASTOLIC CONGESTIVE HEART FAILURE (H): ICD-10-CM

## 2022-01-01 DIAGNOSIS — Z00.01 ENCOUNTER FOR GENERAL ADULT MEDICAL EXAMINATION WITH ABNORMAL FINDINGS: ICD-10-CM

## 2022-01-01 DIAGNOSIS — I25.10 CORONARY ARTERY DISEASE INVOLVING NATIVE CORONARY ARTERY OF NATIVE HEART WITHOUT ANGINA PECTORIS: ICD-10-CM

## 2022-01-01 DIAGNOSIS — I10 BENIGN ESSENTIAL HYPERTENSION: ICD-10-CM

## 2022-01-01 DIAGNOSIS — R53.81 PHYSICAL DECONDITIONING: ICD-10-CM

## 2022-01-01 DIAGNOSIS — F32.A DEPRESSION, UNSPECIFIED DEPRESSION TYPE: ICD-10-CM

## 2022-01-01 DIAGNOSIS — M80.00XD AGE-RELATED OSTEOPOROSIS WITH CURRENT PATHOLOGICAL FRACTURE WITH ROUTINE HEALING, SUBSEQUENT ENCOUNTER: Primary | ICD-10-CM

## 2022-01-01 DIAGNOSIS — D47.2 MGUS (MONOCLONAL GAMMOPATHY OF UNKNOWN SIGNIFICANCE): ICD-10-CM

## 2022-01-01 DIAGNOSIS — I95.9 HYPOTENSION, UNSPECIFIED HYPOTENSION TYPE: Primary | ICD-10-CM

## 2022-01-01 DIAGNOSIS — R26.89 OTHER ABNORMALITIES OF GAIT AND MOBILITY: ICD-10-CM

## 2022-01-01 DIAGNOSIS — J44.1 COPD EXACERBATION (H): Primary | ICD-10-CM

## 2022-01-01 DIAGNOSIS — K52.839 MICROSCOPIC COLITIS, UNSPECIFIED MICROSCOPIC COLITIS TYPE: ICD-10-CM

## 2022-01-01 DIAGNOSIS — I10 ESSENTIAL (PRIMARY) HYPERTENSION: ICD-10-CM

## 2022-01-01 DIAGNOSIS — I48.20 CHRONIC ATRIAL FIBRILLATION (H): ICD-10-CM

## 2022-01-01 DIAGNOSIS — I48.21 PERMANENT ATRIAL FIBRILLATION (H): ICD-10-CM

## 2022-01-01 DIAGNOSIS — I35.0 SEVERE AORTIC STENOSIS: ICD-10-CM

## 2022-01-01 DIAGNOSIS — R53.1 WEAKNESS: ICD-10-CM

## 2022-01-01 DIAGNOSIS — R13.10 DYSPHAGIA: Primary | ICD-10-CM

## 2022-01-01 DIAGNOSIS — R63.4 ABNORMAL WEIGHT LOSS: ICD-10-CM

## 2022-01-01 DIAGNOSIS — Z53.9 DIAGNOSIS NOT YET DEFINED: Primary | ICD-10-CM

## 2022-01-01 DIAGNOSIS — R52 PAIN: Primary | ICD-10-CM

## 2022-01-01 DIAGNOSIS — G25.81 RESTLESS LEGS SYNDROME (RLS): ICD-10-CM

## 2022-01-01 DIAGNOSIS — J84.112 IPF (IDIOPATHIC PULMONARY FIBROSIS) (H): Primary | ICD-10-CM

## 2022-01-01 DIAGNOSIS — G89.18 POSTOPERATIVE PAIN: ICD-10-CM

## 2022-01-01 DIAGNOSIS — R62.7 FAILURE TO THRIVE IN ADULT: ICD-10-CM

## 2022-01-01 DIAGNOSIS — R19.7 DIARRHEA, UNSPECIFIED TYPE: ICD-10-CM

## 2022-01-01 DIAGNOSIS — D47.2 MGUS (MONOCLONAL GAMMOPATHY OF UNKNOWN SIGNIFICANCE): Primary | ICD-10-CM

## 2022-01-01 DIAGNOSIS — R42 DIZZINESS: ICD-10-CM

## 2022-01-01 DIAGNOSIS — J15.69: ICD-10-CM

## 2022-01-01 DIAGNOSIS — Z86.73 HISTORY OF CVA (CEREBROVASCULAR ACCIDENT): ICD-10-CM

## 2022-01-01 DIAGNOSIS — J69.0 ASPIRATION PNEUMONIA OF RIGHT LOWER LOBE, UNSPECIFIED ASPIRATION PNEUMONIA TYPE (H): ICD-10-CM

## 2022-01-01 DIAGNOSIS — E87.6 HYPOKALEMIA: ICD-10-CM

## 2022-01-01 DIAGNOSIS — I10 ESSENTIAL HYPERTENSION: ICD-10-CM

## 2022-01-01 DIAGNOSIS — J44.9 CHRONIC OBSTRUCTIVE PULMONARY DISEASE, UNSPECIFIED COPD TYPE (H): Primary | ICD-10-CM

## 2022-01-01 DIAGNOSIS — E78.5 DYSLIPIDEMIA: ICD-10-CM

## 2022-01-01 DIAGNOSIS — R13.10 DYSPHAGIA, UNSPECIFIED TYPE: ICD-10-CM

## 2022-01-01 DIAGNOSIS — J84.112 IPF (IDIOPATHIC PULMONARY FIBROSIS) (H): ICD-10-CM

## 2022-01-01 DIAGNOSIS — R53.1 GENERALIZED WEAKNESS: ICD-10-CM

## 2022-01-01 DIAGNOSIS — R54 FRAILTY: Primary | ICD-10-CM

## 2022-01-01 DIAGNOSIS — R42 VERTIGO: ICD-10-CM

## 2022-01-01 DIAGNOSIS — J18.9 COMMUNITY ACQUIRED PNEUMONIA, UNSPECIFIED LATERALITY: Primary | ICD-10-CM

## 2022-01-01 DIAGNOSIS — J15.69: Primary | ICD-10-CM

## 2022-01-01 DIAGNOSIS — J44.9 CHRONIC OBSTRUCTIVE PULMONARY DISEASE, UNSPECIFIED COPD TYPE (H): ICD-10-CM

## 2022-01-01 DIAGNOSIS — Z93.1 S/P PERCUTANEOUS ENDOSCOPIC GASTROSTOMY (PEG) TUBE PLACEMENT (H): ICD-10-CM

## 2022-01-01 DIAGNOSIS — R13.10 DYSPHAGIA: ICD-10-CM

## 2022-01-01 DIAGNOSIS — G93.40 ENCEPHALOPATHY: ICD-10-CM

## 2022-01-01 DIAGNOSIS — E63.9 NUTRITIONAL DEFICIENCY: ICD-10-CM

## 2022-01-01 DIAGNOSIS — F51.02 ADJUSTMENT INSOMNIA: ICD-10-CM

## 2022-01-01 DIAGNOSIS — G89.29 OTHER CHRONIC PAIN: ICD-10-CM

## 2022-01-01 DIAGNOSIS — K94.23 MALFUNCTION OF GASTROSTOMY TUBE (H): ICD-10-CM

## 2022-01-01 DIAGNOSIS — I35.0 AORTIC VALVE STENOSIS, ETIOLOGY OF CARDIAC VALVE DISEASE UNSPECIFIED: ICD-10-CM

## 2022-01-01 DIAGNOSIS — I48.91 ATRIAL FIBRILLATION WITH RAPID VENTRICULAR RESPONSE (H): ICD-10-CM

## 2022-01-01 DIAGNOSIS — J18.9 COMMUNITY ACQUIRED PNEUMONIA, UNSPECIFIED LATERALITY: ICD-10-CM

## 2022-01-01 DIAGNOSIS — D72.829 LEUKOCYTOSIS, UNSPECIFIED TYPE: Primary | ICD-10-CM

## 2022-01-01 DIAGNOSIS — I35.0 NONRHEUMATIC AORTIC (VALVE) STENOSIS: ICD-10-CM

## 2022-01-01 DIAGNOSIS — M48.56XD NON-TRAUMATIC COMPRESSION FRACTURE OF L1 LUMBAR VERTEBRA WITH ROUTINE HEALING, SUBSEQUENT ENCOUNTER: ICD-10-CM

## 2022-01-01 DIAGNOSIS — J96.01 ACUTE RESPIRATORY FAILURE WITH HYPOXIA (H): Primary | ICD-10-CM

## 2022-01-01 DIAGNOSIS — J18.9 PNEUMONIA OF RIGHT LUNG DUE TO INFECTIOUS ORGANISM, UNSPECIFIED PART OF LUNG: ICD-10-CM

## 2022-01-01 DIAGNOSIS — E78.5 HYPERLIPIDEMIA WITH TARGET LDL LESS THAN 70: ICD-10-CM

## 2022-01-01 DIAGNOSIS — R79.89 ELEVATED LACTIC ACID LEVEL: ICD-10-CM

## 2022-01-01 DIAGNOSIS — R05.9 COUGH, UNSPECIFIED TYPE: ICD-10-CM

## 2022-01-01 DIAGNOSIS — Z78.9 TAKES DIETARY SUPPLEMENTS: ICD-10-CM

## 2022-01-01 DIAGNOSIS — G47.33 OSA (OBSTRUCTIVE SLEEP APNEA): ICD-10-CM

## 2022-01-01 DIAGNOSIS — E53.8 VITAMIN B12 DEFICIENCY (NON ANEMIC): ICD-10-CM

## 2022-01-01 DIAGNOSIS — J84.112: ICD-10-CM

## 2022-01-01 DIAGNOSIS — D64.9 ANEMIA, UNSPECIFIED TYPE: ICD-10-CM

## 2022-01-01 DIAGNOSIS — E86.0 DEHYDRATION: ICD-10-CM

## 2022-01-01 DIAGNOSIS — F33.0 MILD EPISODE OF RECURRENT MAJOR DEPRESSIVE DISORDER (H): Primary | ICD-10-CM

## 2022-01-01 DIAGNOSIS — R62.7 FAILURE TO THRIVE IN ADULT: Primary | ICD-10-CM

## 2022-01-01 DIAGNOSIS — I35.0 AORTIC STENOSIS: Primary | ICD-10-CM

## 2022-01-01 DIAGNOSIS — S22.080S COMPRESSION FRACTURE OF T12 VERTEBRA, SEQUELA: ICD-10-CM

## 2022-01-01 DIAGNOSIS — E43 SEVERE MALNUTRITION (H): ICD-10-CM

## 2022-01-01 DIAGNOSIS — M20.31 HALLUX MALLEUS, RIGHT: Primary | ICD-10-CM

## 2022-01-01 DIAGNOSIS — K52.9 CHRONIC DIARRHEA: ICD-10-CM

## 2022-01-01 DIAGNOSIS — J84.10 LUNG FIBROSIS (H): ICD-10-CM

## 2022-01-01 DIAGNOSIS — I25.10 CORONARY ARTERY DISEASE INVOLVING NATIVE CORONARY ARTERY OF NATIVE HEART WITHOUT ANGINA PECTORIS: Primary | ICD-10-CM

## 2022-01-01 DIAGNOSIS — J84.112: Primary | ICD-10-CM

## 2022-01-01 DIAGNOSIS — K52.839 MICROSCOPIC COLITIS, UNSPECIFIED MICROSCOPIC COLITIS TYPE: Primary | ICD-10-CM

## 2022-01-01 DIAGNOSIS — G04.00 ADEM (ACUTE DISSEMINATED ENCEPHALOMYELITIS): ICD-10-CM

## 2022-01-01 DIAGNOSIS — K59.1 FUNCTIONAL DIARRHEA: ICD-10-CM

## 2022-01-01 DIAGNOSIS — I95.89 CHRONIC HYPOTENSION: ICD-10-CM

## 2022-01-01 DIAGNOSIS — I73.9 PVD (PERIPHERAL VASCULAR DISEASE) (H): ICD-10-CM

## 2022-01-01 DIAGNOSIS — Z71.89 OTHER SPECIFIED COUNSELING: ICD-10-CM

## 2022-01-01 DIAGNOSIS — I48.0 PAROXYSMAL ATRIAL FIBRILLATION (H): ICD-10-CM

## 2022-01-01 DIAGNOSIS — I48.91 ATRIAL FIBRILLATION, UNSPECIFIED TYPE (H): ICD-10-CM

## 2022-01-01 DIAGNOSIS — J18.9 COMMUNITY ACQUIRED PNEUMONIA OF LEFT LOWER LOBE OF LUNG: ICD-10-CM

## 2022-01-01 DIAGNOSIS — J18.9 PNEUMONIA OF RIGHT UPPER LOBE DUE TO INFECTIOUS ORGANISM: ICD-10-CM

## 2022-01-01 DIAGNOSIS — R53.83 OTHER FATIGUE: Primary | ICD-10-CM

## 2022-01-01 DIAGNOSIS — M80.00XD AGE-RELATED OSTEOPOROSIS WITH CURRENT PATHOLOGICAL FRACTURE WITH ROUTINE HEALING, SUBSEQUENT ENCOUNTER: ICD-10-CM

## 2022-01-01 DIAGNOSIS — I50.30 HEART FAILURE WITH PRESERVED EJECTION FRACTION, NYHA CLASS II (H): ICD-10-CM

## 2022-01-01 DIAGNOSIS — J69.0 ASPIRATION PNEUMONIA, UNSPECIFIED ASPIRATION PNEUMONIA TYPE, UNSPECIFIED LATERALITY, UNSPECIFIED PART OF LUNG (H): Primary | ICD-10-CM

## 2022-01-01 DIAGNOSIS — R91.8 PULMONARY NODULES: ICD-10-CM

## 2022-01-01 DIAGNOSIS — Z11.59 ENCOUNTER FOR SCREENING FOR OTHER VIRAL DISEASES: Primary | ICD-10-CM

## 2022-01-01 DIAGNOSIS — J18.9 PNEUMONIA OF BOTH LOWER LOBES DUE TO INFECTIOUS ORGANISM: ICD-10-CM

## 2022-01-01 DIAGNOSIS — Z87.01 PERSONAL HISTORY OF PNEUMONIA (RECURRENT): ICD-10-CM

## 2022-01-01 DIAGNOSIS — I48.92 ATRIAL FLUTTER, UNSPECIFIED TYPE (H): ICD-10-CM

## 2022-01-01 DIAGNOSIS — L03.031 CELLULITIS OF TOE OF RIGHT FOOT: Primary | ICD-10-CM

## 2022-01-01 DIAGNOSIS — J15.9 COMMUNITY ACQUIRED BACTERIAL PNEUMONIA: Primary | ICD-10-CM

## 2022-01-01 DIAGNOSIS — D47.2 IGG MONOCLONAL GAMMOPATHY: ICD-10-CM

## 2022-01-01 DIAGNOSIS — E43 EDEMA DUE TO MALNUTRITION, DUE TO UNSPECIFIED MALNUTRITION TYPE (H): ICD-10-CM

## 2022-01-01 DIAGNOSIS — G93.49 OTHER ENCEPHALOPATHY: ICD-10-CM

## 2022-01-01 DIAGNOSIS — M47.812 CERVICAL SPONDYLOSIS WITHOUT MYELOPATHY: Primary | ICD-10-CM

## 2022-01-01 DIAGNOSIS — R93.89 ABNORMAL CHEST X-RAY: ICD-10-CM

## 2022-01-01 DIAGNOSIS — J96.01 ACUTE RESPIRATORY FAILURE WITH HYPOXIA (H): ICD-10-CM

## 2022-01-01 DIAGNOSIS — R19.7 DIARRHEA, UNSPECIFIED: ICD-10-CM

## 2022-01-01 DIAGNOSIS — D64.9 CHRONIC ANEMIA: ICD-10-CM

## 2022-01-01 DIAGNOSIS — I95.89 OTHER SPECIFIED HYPOTENSION: ICD-10-CM

## 2022-01-01 DIAGNOSIS — M54.50 LUMBAR SPINE PAIN: ICD-10-CM

## 2022-01-01 DIAGNOSIS — R62.7 ADULT FAILURE TO THRIVE: ICD-10-CM

## 2022-01-01 DIAGNOSIS — R05.9 COUGH: ICD-10-CM

## 2022-01-01 DIAGNOSIS — J15.9 COMMUNITY ACQUIRED BACTERIAL PNEUMONIA: ICD-10-CM

## 2022-01-01 LAB
A-TOCOPHEROL VIT E SERPL-MCNC: 8.5 MG/L
ACHR BIND IGG+IGM SER IA-SCNC: 0 NMOL/L
ALBUMIN (EXTERNAL): 3 G/DL (ref 3.5–5)
ALBUMIN SERPL ELPH-MCNC: 2.9 G/DL (ref 3.7–5.1)
ALBUMIN SERPL ELPH-MCNC: 3 G/DL (ref 3.7–5.1)
ALBUMIN SERPL ELPH-MCNC: 3.4 G/DL (ref 3.7–5.1)
ALBUMIN SERPL-MCNC: 1.8 G/DL (ref 3.4–5)
ALBUMIN SERPL-MCNC: 1.8 G/DL (ref 3.4–5)
ALBUMIN SERPL-MCNC: 2 G/DL (ref 3.4–5)
ALBUMIN SERPL-MCNC: 2.2 G/DL (ref 3.4–5)
ALBUMIN SERPL-MCNC: 2.2 G/DL (ref 3.4–5)
ALBUMIN SERPL-MCNC: 2.4 G/DL (ref 3.4–5)
ALBUMIN SERPL-MCNC: 2.6 G/DL (ref 3.4–5)
ALBUMIN SERPL-MCNC: 2.9 G/DL (ref 3.4–5)
ALBUMIN UR-MCNC: 30 MG/DL
ALBUMIN UR-MCNC: 50 MG/DL
ALBUMIN UR-MCNC: NEGATIVE MG/DL
ALKALINE PHOSPHATASE (EXTERNAL): 96 U/L (ref 40–150)
ALP SERPL-CCNC: 100 U/L (ref 40–150)
ALP SERPL-CCNC: 104 U/L (ref 40–150)
ALP SERPL-CCNC: 110 U/L (ref 40–150)
ALP SERPL-CCNC: 251 U/L (ref 40–150)
ALP SERPL-CCNC: 351 U/L (ref 40–150)
ALP SERPL-CCNC: 83 U/L (ref 40–150)
ALP SERPL-CCNC: 93 U/L (ref 40–150)
ALPHA1 GLOB SERPL ELPH-MCNC: 0.4 G/DL (ref 0.2–0.4)
ALPHA1 GLOB SERPL ELPH-MCNC: 0.4 G/DL (ref 0.2–0.4)
ALPHA1 GLOB SERPL ELPH-MCNC: 0.5 G/DL (ref 0.2–0.4)
ALPHA2 GLOB SERPL ELPH-MCNC: 0.8 G/DL (ref 0.5–0.9)
ALPHA2 GLOB SERPL ELPH-MCNC: 0.9 G/DL (ref 0.5–0.9)
ALPHA2 GLOB SERPL ELPH-MCNC: 1 G/DL (ref 0.5–0.9)
ALT SERPL W P-5'-P-CCNC: 12 U/L (ref 0–70)
ALT SERPL W P-5'-P-CCNC: 12 U/L (ref 0–70)
ALT SERPL W P-5'-P-CCNC: 123 U/L (ref 0–70)
ALT SERPL W P-5'-P-CCNC: 14 U/L (ref 0–70)
ALT SERPL W P-5'-P-CCNC: 16 U/L (ref 0–70)
ALT SERPL W P-5'-P-CCNC: 16 U/L (ref 0–70)
ALT SERPL W P-5'-P-CCNC: 68 U/L (ref 0–70)
ALT SERPL-CCNC: 17 U/L (ref 0–55)
ANION GAP SERPL CALC-SCNC: 8 MMOL/L (ref 7–16)
ANION GAP SERPL CALCULATED.3IONS-SCNC: 2 MMOL/L (ref 3–14)
ANION GAP SERPL CALCULATED.3IONS-SCNC: 2 MMOL/L (ref 3–14)
ANION GAP SERPL CALCULATED.3IONS-SCNC: 3 MMOL/L (ref 3–14)
ANION GAP SERPL CALCULATED.3IONS-SCNC: 4 MMOL/L (ref 3–14)
ANION GAP SERPL CALCULATED.3IONS-SCNC: 5 MMOL/L (ref 3–14)
ANION GAP SERPL CALCULATED.3IONS-SCNC: 6 MMOL/L (ref 3–14)
ANION GAP SERPL CALCULATED.3IONS-SCNC: 7 MMOL/L (ref 3–14)
APPEARANCE UR: CLEAR
AST SERPL W P-5'-P-CCNC: 22 U/L (ref 0–45)
AST SERPL W P-5'-P-CCNC: 23 U/L (ref 0–45)
AST SERPL W P-5'-P-CCNC: 24 U/L (ref 0–45)
AST SERPL W P-5'-P-CCNC: 24 U/L (ref 0–45)
AST SERPL W P-5'-P-CCNC: 34 U/L (ref 0–45)
AST SERPL W P-5'-P-CCNC: 37 U/L (ref 0–45)
AST SERPL W P-5'-P-CCNC: 88 U/L (ref 0–45)
AST SERPL-CCNC: 27 U/L (ref 10–40)
ATRIAL RATE - MUSE: 125 BPM
ATRIAL RATE - MUSE: 258 BPM
ATRIAL RATE - MUSE: 326 BPM
ATRIAL RATE - MUSE: NORMAL BPM
B-GLOBULIN SERPL ELPH-MCNC: 0.6 G/DL (ref 0.6–1)
B-GLOBULIN SERPL ELPH-MCNC: 0.7 G/DL (ref 0.6–1)
B-GLOBULIN SERPL ELPH-MCNC: 0.7 G/DL (ref 0.6–1)
BACTERIA BLD CULT: NO GROWTH
BACTERIA SPT CULT: ABNORMAL
BACTERIA SPT CULT: ABNORMAL
BACTERIA SPT CULT: NORMAL
BACTERIA SPT CULT: NORMAL
BASOPHILS # BLD AUTO: 0.1 10E3/UL (ref 0–0.2)
BASOPHILS # BLD AUTO: 0.2 10E3/UL (ref 0–0.2)
BASOPHILS # BLD AUTO: 0.2 10E3/UL (ref 0–0.2)
BASOPHILS NFR BLD AUTO: 0 %
BASOPHILS NFR BLD AUTO: 0 %
BASOPHILS NFR BLD AUTO: 1 %
BETA+GAMMA TOCOPHEROL SERPL-MCNC: 0.4 MG/L
BILIRUB DIRECT SERPL-MCNC: <0.1 MG/DL (ref 0–0.2)
BILIRUB SERPL-MCNC: 0.3 MG/DL (ref 0.2–1.3)
BILIRUB SERPL-MCNC: 0.5 MG/DL (ref 0.2–1.3)
BILIRUB SERPL-MCNC: 0.7 MG/DL (ref 0.2–1.3)
BILIRUB SERPL-MCNC: 0.8 MG/DL (ref 0.2–1.2)
BILIRUB UR QL STRIP: NEGATIVE
BUN SERPL-MCNC: 11 MG/DL (ref 7–30)
BUN SERPL-MCNC: 11 MG/DL (ref 7–30)
BUN SERPL-MCNC: 12 MG/DL (ref 7–26)
BUN SERPL-MCNC: 12 MG/DL (ref 7–30)
BUN SERPL-MCNC: 13 MG/DL (ref 7–30)
BUN SERPL-MCNC: 14 MG/DL (ref 7–26)
BUN SERPL-MCNC: 14 MG/DL (ref 7–30)
BUN SERPL-MCNC: 14 MG/DL (ref 7–30)
BUN SERPL-MCNC: 15 MG/DL (ref 7–26)
BUN SERPL-MCNC: 15 MG/DL (ref 7–30)
BUN SERPL-MCNC: 16 MG/DL (ref 7–30)
BUN SERPL-MCNC: 16 MG/DL (ref 7–30)
BUN SERPL-MCNC: 17 MG/DL (ref 7–30)
BUN SERPL-MCNC: 18 MG/DL (ref 7–30)
BUN SERPL-MCNC: 19 MG/DL (ref 7–30)
BUN SERPL-MCNC: 21 MG/DL (ref 7–30)
BUN SERPL-MCNC: 23 MG/DL (ref 7–30)
BUN SERPL-MCNC: 25 MG/DL (ref 7–30)
BUN SERPL-MCNC: 25 MG/DL (ref 7–30)
BUN SERPL-MCNC: 26 MG/DL (ref 7–30)
BUN SERPL-MCNC: 28 MG/DL (ref 7–30)
BUN SERPL-MCNC: 31 MG/DL (ref 7–30)
BUN SERPL-MCNC: 9 MG/DL (ref 7–30)
C COLI+JEJUNI+LARI FUSA STL QL NAA+PROBE: NOT DETECTED
C DIFF TOX B STL QL: NEGATIVE
CALCIUM (EXTERNAL): 8.4 MG/DL (ref 8.4–10.4)
CALCIUM (EXTERNAL): 8.5 MG/DL (ref 8.4–10.4)
CALCIUM (EXTERNAL): 8.8 MG/DL (ref 8.4–10.4)
CALCIUM SERPL-MCNC: 7.6 MG/DL (ref 8.5–10.1)
CALCIUM SERPL-MCNC: 7.6 MG/DL (ref 8.5–10.1)
CALCIUM SERPL-MCNC: 7.7 MG/DL (ref 8.5–10.1)
CALCIUM SERPL-MCNC: 7.8 MG/DL (ref 8.5–10.1)
CALCIUM SERPL-MCNC: 7.8 MG/DL (ref 8.5–10.1)
CALCIUM SERPL-MCNC: 7.9 MG/DL (ref 8.5–10.1)
CALCIUM SERPL-MCNC: 8 MG/DL (ref 8.5–10.1)
CALCIUM SERPL-MCNC: 8.1 MG/DL (ref 8.5–10.1)
CALCIUM SERPL-MCNC: 8.2 MG/DL (ref 8.5–10.1)
CALCIUM SERPL-MCNC: 8.3 MG/DL (ref 8.5–10.1)
CALCIUM SERPL-MCNC: 8.3 MG/DL (ref 8.5–10.1)
CALCIUM SERPL-MCNC: 8.4 MG/DL (ref 8.5–10.1)
CALCIUM SERPL-MCNC: 8.5 MG/DL (ref 8.5–10.1)
CALCIUM SERPL-MCNC: 8.6 MG/DL (ref 8.5–10.1)
CALCIUM SERPL-MCNC: 8.8 MG/DL (ref 8.5–10.1)
CALCIUM SERPL-MCNC: 9.1 MG/DL (ref 8.5–10.1)
CHLORIDE (EXTERNAL): 104 MMOL/L (ref 98–109)
CHLORIDE (EXTERNAL): 104 MMOL/L (ref 98–109)
CHLORIDE (EXTERNAL): 106 MMOL/L (ref 98–109)
CHLORIDE BLD-SCNC: 100 MMOL/L (ref 94–109)
CHLORIDE BLD-SCNC: 100 MMOL/L (ref 94–109)
CHLORIDE BLD-SCNC: 101 MMOL/L (ref 94–109)
CHLORIDE BLD-SCNC: 102 MMOL/L (ref 94–109)
CHLORIDE BLD-SCNC: 103 MMOL/L (ref 94–109)
CHLORIDE BLD-SCNC: 103 MMOL/L (ref 94–109)
CHLORIDE BLD-SCNC: 104 MMOL/L (ref 94–109)
CHLORIDE BLD-SCNC: 104 MMOL/L (ref 94–109)
CHLORIDE BLD-SCNC: 105 MMOL/L (ref 94–109)
CHLORIDE BLD-SCNC: 105 MMOL/L (ref 94–109)
CHLORIDE BLD-SCNC: 106 MMOL/L (ref 94–109)
CHLORIDE BLD-SCNC: 107 MMOL/L (ref 94–109)
CHLORIDE BLD-SCNC: 108 MMOL/L (ref 94–109)
CHLORIDE BLD-SCNC: 109 MMOL/L (ref 94–109)
CHLORIDE BLD-SCNC: 109 MMOL/L (ref 94–109)
CHLORIDE BLD-SCNC: 110 MMOL/L (ref 94–109)
CHLORIDE BLD-SCNC: 111 MMOL/L (ref 94–109)
CHLORIDE BLD-SCNC: 113 MMOL/L (ref 94–109)
CHLORIDE BLD-SCNC: 113 MMOL/L (ref 94–109)
CHLORIDE BLD-SCNC: 114 MMOL/L (ref 94–109)
CHLORIDE BLD-SCNC: 96 MMOL/L (ref 94–109)
CO2 (EXTERNAL): 26 MMOL/L (ref 20–29)
CO2 (EXTERNAL): 27 MMOL/L (ref 20–29)
CO2 (EXTERNAL): 28 MMOL/L (ref 20–29)
CO2 SERPL-SCNC: 22 MMOL/L (ref 20–32)
CO2 SERPL-SCNC: 23 MMOL/L (ref 20–32)
CO2 SERPL-SCNC: 24 MMOL/L (ref 20–32)
CO2 SERPL-SCNC: 25 MMOL/L (ref 20–32)
CO2 SERPL-SCNC: 26 MMOL/L (ref 20–32)
CO2 SERPL-SCNC: 27 MMOL/L (ref 20–32)
CO2 SERPL-SCNC: 28 MMOL/L (ref 20–32)
CO2 SERPL-SCNC: 29 MMOL/L (ref 20–32)
CO2 SERPL-SCNC: 30 MMOL/L (ref 20–32)
CO2 SERPL-SCNC: 32 MMOL/L (ref 20–32)
CO2 SERPL-SCNC: 34 MMOL/L (ref 20–32)
COLOR UR AUTO: ABNORMAL
COLOR UR AUTO: ABNORMAL
COLOR UR AUTO: YELLOW
CORTIS SERPL-MCNC: 14.1 UG/DL
CREAT SERPL-MCNC: 0.43 MG/DL (ref 0.66–1.25)
CREAT SERPL-MCNC: 0.46 MG/DL (ref 0.66–1.25)
CREAT SERPL-MCNC: 0.47 MG/DL (ref 0.66–1.25)
CREAT SERPL-MCNC: 0.48 MG/DL (ref 0.66–1.25)
CREAT SERPL-MCNC: 0.5 MG/DL (ref 0.66–1.25)
CREAT SERPL-MCNC: 0.51 MG/DL (ref 0.66–1.25)
CREAT SERPL-MCNC: 0.52 MG/DL (ref 0.66–1.25)
CREAT SERPL-MCNC: 0.54 MG/DL (ref 0.66–1.25)
CREAT SERPL-MCNC: 0.55 MG/DL (ref 0.66–1.25)
CREAT SERPL-MCNC: 0.57 MG/DL (ref 0.66–1.25)
CREAT SERPL-MCNC: 0.59 MG/DL (ref 0.66–1.25)
CREAT SERPL-MCNC: 0.59 MG/DL (ref 0.66–1.25)
CREAT SERPL-MCNC: 0.6 MG/DL (ref 0.66–1.25)
CREAT SERPL-MCNC: 0.61 MG/DL (ref 0.66–1.25)
CREAT SERPL-MCNC: 0.62 MG/DL (ref 0.66–1.25)
CREAT SERPL-MCNC: 0.66 MG/DL (ref 0.66–1.25)
CREAT SERPL-MCNC: 0.74 MG/DL (ref 0.66–1.25)
CREAT SERPL-MCNC: 0.78 MG/DL (ref 0.66–1.25)
CREAT SERPL-MCNC: 0.79 MG/DL (ref 0.66–1.25)
CREAT SERPL-MCNC: 0.81 MG/DL (ref 0.66–1.25)
CREAT SERPL-MCNC: 0.91 MG/DL (ref 0.66–1.25)
CREAT SERPL-MCNC: 0.93 MG/DL (ref 0.66–1.25)
CREAT SERPL-MCNC: 0.93 MG/DL (ref 0.66–1.25)
CREAT SERPL-MCNC: 0.94 MG/DL (ref 0.66–1.25)
CREAT SERPL-MCNC: 1.13 MG/DL (ref 0.66–1.25)
CREATININE (EXTERNAL): 0.77 MG/DL (ref 0.73–1.18)
CREATININE (EXTERNAL): 0.84 MG/DL (ref 0.73–1.18)
CREATININE (EXTERNAL): 0.89 MG/DL (ref 0.73–1.18)
CRP SERPL-MCNC: 49.1 MG/L (ref 0–8)
CRP SERPL-MCNC: 55.1 MG/L (ref 0–8)
CRP SERPL-MCNC: 85.4 MG/L (ref 0–8)
CRP SERPL-MCNC: 88.1 MG/L (ref 0–8)
CULTURE HARVEST COMPLETE DATE: NORMAL
DEPRECATED CALCIDIOL+CALCIFEROL SERPL-MC: 30 UG/L (ref 20–75)
DIASTOLIC BLOOD PRESSURE - MUSE: NORMAL MMHG
DIFFERENTIAL: ABNORMAL
DIGOXIN SERPL-MCNC: 0.9 UG/L
DIGOXIN SERPL-MCNC: 0.9 UG/L
DIGOXIN SERPL-MCNC: 1 UG/L
DIGOXIN SERPL-MCNC: 1.1 UG/L
DIGOXIN SERPL-MCNC: 1.3 UG/L
DIGOXIN SERPL-MCNC: 1.4 UG/L
EC STX1 GENE STL QL NAA+PROBE: NOT DETECTED
EC STX2 GENE STL QL NAA+PROBE: NOT DETECTED
EOSINOPHIL # BLD AUTO: 0.2 10E3/UL (ref 0–0.7)
EOSINOPHIL # BLD AUTO: 0.3 10E3/UL (ref 0–0.7)
EOSINOPHIL # BLD AUTO: 0.3 10E3/UL (ref 0–0.7)
EOSINOPHIL # BLD AUTO: 0.4 10E3/UL (ref 0–0.7)
EOSINOPHIL # BLD AUTO: 0.5 10E3/UL (ref 0–0.7)
EOSINOPHIL # BLD AUTO: 0.6 10E3/UL (ref 0–0.7)
EOSINOPHIL NFR BLD AUTO: 1 %
EOSINOPHIL NFR BLD AUTO: 2 %
EOSINOPHIL NFR BLD AUTO: 3 %
EOSINOPHIL NFR BLD AUTO: 3 %
EOSINOPHIL NFR BLD AUTO: 4 %
EOSINOPHIL NFR BLD AUTO: 5 %
EOSINOPHIL NFR BLD AUTO: 5 %
EOSINOPHIL NFR BLD AUTO: 6 %
ERYTHROCYTE [DISTWIDTH] IN BLOOD BY AUTOMATED COUNT: 13.5 % (ref 10–15)
ERYTHROCYTE [DISTWIDTH] IN BLOOD BY AUTOMATED COUNT: 13.5 % (ref 10–15)
ERYTHROCYTE [DISTWIDTH] IN BLOOD BY AUTOMATED COUNT: 13.9 % (ref 10–15)
ERYTHROCYTE [DISTWIDTH] IN BLOOD BY AUTOMATED COUNT: 14 % (ref 10–15)
ERYTHROCYTE [DISTWIDTH] IN BLOOD BY AUTOMATED COUNT: 14 % (ref 10–15)
ERYTHROCYTE [DISTWIDTH] IN BLOOD BY AUTOMATED COUNT: 14.2 % (ref 10–15)
ERYTHROCYTE [DISTWIDTH] IN BLOOD BY AUTOMATED COUNT: 14.5 % (ref 10–15)
ERYTHROCYTE [DISTWIDTH] IN BLOOD BY AUTOMATED COUNT: 14.5 % (ref 10–15)
ERYTHROCYTE [DISTWIDTH] IN BLOOD BY AUTOMATED COUNT: 14.6 % (ref 10–15)
ERYTHROCYTE [DISTWIDTH] IN BLOOD BY AUTOMATED COUNT: 14.6 % (ref 10–15)
ERYTHROCYTE [DISTWIDTH] IN BLOOD BY AUTOMATED COUNT: 14.9 % (ref 11.9–15.5)
ERYTHROCYTE [DISTWIDTH] IN BLOOD BY AUTOMATED COUNT: 15.1 % (ref 11.9–15.5)
ERYTHROCYTE [DISTWIDTH] IN BLOOD BY AUTOMATED COUNT: 15.4 % (ref 11.9–15.5)
ERYTHROCYTE [DISTWIDTH] IN BLOOD BY AUTOMATED COUNT: 15.9 % (ref 10–15)
ERYTHROCYTE [DISTWIDTH] IN BLOOD BY AUTOMATED COUNT: 16.3 % (ref 10–15)
ERYTHROCYTE [DISTWIDTH] IN BLOOD BY AUTOMATED COUNT: 16.3 % (ref 10–15)
ERYTHROCYTE [DISTWIDTH] IN BLOOD BY AUTOMATED COUNT: 16.4 % (ref 10–15)
ERYTHROCYTE [DISTWIDTH] IN BLOOD BY AUTOMATED COUNT: 16.5 % (ref 10–15)
ERYTHROCYTE [DISTWIDTH] IN BLOOD BY AUTOMATED COUNT: 16.5 % (ref 10–15)
ERYTHROCYTE [DISTWIDTH] IN BLOOD BY AUTOMATED COUNT: 16.6 % (ref 10–15)
ERYTHROCYTE [DISTWIDTH] IN BLOOD BY AUTOMATED COUNT: 16.8 % (ref 10–15)
ERYTHROCYTE [DISTWIDTH] IN BLOOD BY AUTOMATED COUNT: 17 % (ref 10–15)
ERYTHROCYTE [DISTWIDTH] IN BLOOD BY AUTOMATED COUNT: 17.1 % (ref 10–15)
ERYTHROCYTE [DISTWIDTH] IN BLOOD BY AUTOMATED COUNT: 17.1 % (ref 10–15)
ERYTHROCYTE [DISTWIDTH] IN BLOOD BY AUTOMATED COUNT: 17.2 % (ref 10–15)
ERYTHROCYTE [DISTWIDTH] IN BLOOD BY AUTOMATED COUNT: 17.3 % (ref 10–15)
ERYTHROCYTE [DISTWIDTH] IN BLOOD BY AUTOMATED COUNT: 17.5 % (ref 10–15)
ERYTHROCYTE [DISTWIDTH] IN BLOOD BY AUTOMATED COUNT: 17.6 % (ref 10–15)
ERYTHROCYTE [DISTWIDTH] IN BLOOD BY AUTOMATED COUNT: 17.6 % (ref 10–15)
ERYTHROCYTE [DISTWIDTH] IN BLOOD BY AUTOMATED COUNT: 17.7 % (ref 10–15)
ERYTHROCYTE [DISTWIDTH] IN BLOOD BY AUTOMATED COUNT: 18.2 % (ref 10–15)
ERYTHROCYTE [DISTWIDTH] IN BLOOD BY AUTOMATED COUNT: 18.5 % (ref 10–15)
ERYTHROCYTE [DISTWIDTH] IN BLOOD BY AUTOMATED COUNT: 19.1 % (ref 10–15)
ERYTHROCYTE [SEDIMENTATION RATE] IN BLOOD BY WESTERGREN METHOD: 87 MM/HR (ref 0–20)
FERRITIN SERPL-MCNC: 642 NG/ML (ref 26–388)
FLUAV RNA SPEC QL NAA+PROBE: NEGATIVE
FLUBV RNA RESP QL NAA+PROBE: NEGATIVE
FOLATE SERPL-MCNC: 24.6 NG/ML
FOLATE SERPL-MCNC: 28.1 NG/ML
GAMMA GLOB SERPL ELPH-MCNC: 2.3 G/DL (ref 0.7–1.6)
GAMMA GLOB SERPL ELPH-MCNC: 2.6 G/DL (ref 0.7–1.6)
GAMMA GLOB SERPL ELPH-MCNC: 2.7 G/DL (ref 0.7–1.6)
GAMMA INTERFERON BACKGROUND BLD IA-ACNC: 0.02 IU/ML
GFR ESTIMATED (EXTERNAL): >60 ML/MIN/1.73M2
GFR SERPL CREATININE-BSD FRML MDRD: 67 ML/MIN/1.73M2
GFR SERPL CREATININE-BSD FRML MDRD: 83 ML/MIN/1.73M2
GFR SERPL CREATININE-BSD FRML MDRD: 84 ML/MIN/1.73M2
GFR SERPL CREATININE-BSD FRML MDRD: 84 ML/MIN/1.73M2
GFR SERPL CREATININE-BSD FRML MDRD: 86 ML/MIN/1.73M2
GFR SERPL CREATININE-BSD FRML MDRD: 90 ML/MIN/1.73M2
GFR SERPL CREATININE-BSD FRML MDRD: 90 ML/MIN/1.73M2
GFR SERPL CREATININE-BSD FRML MDRD: >90 ML/MIN/1.73M2
GLUCOSE (EXTERNAL): 74 MG/DL (ref 70–100)
GLUCOSE (EXTERNAL): 78 MG/DL (ref 70–100)
GLUCOSE (EXTERNAL): 82 MG/DL (ref 70–100)
GLUCOSE BLD-MCNC: 100 MG/DL (ref 70–99)
GLUCOSE BLD-MCNC: 100 MG/DL (ref 70–99)
GLUCOSE BLD-MCNC: 101 MG/DL (ref 70–99)
GLUCOSE BLD-MCNC: 102 MG/DL (ref 70–99)
GLUCOSE BLD-MCNC: 102 MG/DL (ref 70–99)
GLUCOSE BLD-MCNC: 105 MG/DL (ref 70–99)
GLUCOSE BLD-MCNC: 106 MG/DL (ref 70–99)
GLUCOSE BLD-MCNC: 106 MG/DL (ref 70–99)
GLUCOSE BLD-MCNC: 107 MG/DL (ref 70–99)
GLUCOSE BLD-MCNC: 108 MG/DL (ref 70–99)
GLUCOSE BLD-MCNC: 113 MG/DL (ref 70–99)
GLUCOSE BLD-MCNC: 114 MG/DL (ref 70–99)
GLUCOSE BLD-MCNC: 120 MG/DL (ref 70–99)
GLUCOSE BLD-MCNC: 122 MG/DL (ref 70–99)
GLUCOSE BLD-MCNC: 127 MG/DL (ref 70–99)
GLUCOSE BLD-MCNC: 130 MG/DL (ref 70–99)
GLUCOSE BLD-MCNC: 131 MG/DL (ref 70–99)
GLUCOSE BLD-MCNC: 131 MG/DL (ref 70–99)
GLUCOSE BLD-MCNC: 132 MG/DL (ref 70–99)
GLUCOSE BLD-MCNC: 135 MG/DL (ref 70–99)
GLUCOSE BLD-MCNC: 79 MG/DL (ref 70–99)
GLUCOSE BLD-MCNC: 81 MG/DL (ref 70–99)
GLUCOSE BLD-MCNC: 83 MG/DL (ref 70–99)
GLUCOSE BLD-MCNC: 85 MG/DL (ref 70–99)
GLUCOSE BLD-MCNC: 87 MG/DL (ref 70–99)
GLUCOSE BLD-MCNC: 89 MG/DL (ref 70–99)
GLUCOSE BLD-MCNC: 89 MG/DL (ref 70–99)
GLUCOSE BLD-MCNC: 92 MG/DL (ref 70–99)
GLUCOSE BLD-MCNC: 98 MG/DL (ref 70–99)
GLUCOSE BLDC GLUCOMTR-MCNC: 103 MG/DL (ref 70–99)
GLUCOSE BLDC GLUCOMTR-MCNC: 105 MG/DL (ref 70–99)
GLUCOSE BLDC GLUCOMTR-MCNC: 106 MG/DL (ref 70–99)
GLUCOSE BLDC GLUCOMTR-MCNC: 111 MG/DL (ref 70–99)
GLUCOSE BLDC GLUCOMTR-MCNC: 113 MG/DL (ref 70–99)
GLUCOSE BLDC GLUCOMTR-MCNC: 114 MG/DL (ref 70–99)
GLUCOSE BLDC GLUCOMTR-MCNC: 116 MG/DL (ref 70–99)
GLUCOSE BLDC GLUCOMTR-MCNC: 117 MG/DL (ref 70–99)
GLUCOSE BLDC GLUCOMTR-MCNC: 118 MG/DL (ref 70–99)
GLUCOSE BLDC GLUCOMTR-MCNC: 118 MG/DL (ref 70–99)
GLUCOSE BLDC GLUCOMTR-MCNC: 122 MG/DL (ref 70–99)
GLUCOSE BLDC GLUCOMTR-MCNC: 123 MG/DL (ref 70–99)
GLUCOSE BLDC GLUCOMTR-MCNC: 127 MG/DL (ref 70–99)
GLUCOSE BLDC GLUCOMTR-MCNC: 131 MG/DL (ref 70–99)
GLUCOSE BLDC GLUCOMTR-MCNC: 86 MG/DL (ref 70–99)
GLUCOSE UR STRIP-MCNC: NEGATIVE MG/DL
GRAM STAIN RESULT: ABNORMAL
GRAM STAIN RESULT: NORMAL
HCO3 BLDV-SCNC: 25 MMOL/L (ref 21–28)
HCT VFR BLD AUTO: 22.6 % (ref 40–53)
HCT VFR BLD AUTO: 23.3 % (ref 40–53)
HCT VFR BLD AUTO: 26.9 % (ref 40–53)
HCT VFR BLD AUTO: 27.2 % (ref 40–53)
HCT VFR BLD AUTO: 27.3 % (ref 40–53)
HCT VFR BLD AUTO: 27.4 % (ref 40–53)
HCT VFR BLD AUTO: 28 % (ref 40–53)
HCT VFR BLD AUTO: 28 % (ref 40–53)
HCT VFR BLD AUTO: 28.2 % (ref 40–53)
HCT VFR BLD AUTO: 28.2 % (ref 40–53)
HCT VFR BLD AUTO: 28.7 % (ref 40–53)
HCT VFR BLD AUTO: 29.3 % (ref 40–53)
HCT VFR BLD AUTO: 29.4 % (ref 40–53)
HCT VFR BLD AUTO: 29.6 % (ref 40–53)
HCT VFR BLD AUTO: 30.3 % (ref 40–53)
HCT VFR BLD AUTO: 30.6 % (ref 40–53)
HCT VFR BLD AUTO: 30.9 % (ref 40–53)
HCT VFR BLD AUTO: 30.9 % (ref 40–53)
HCT VFR BLD AUTO: 31.4 % (ref 40–53)
HCT VFR BLD AUTO: 31.4 % (ref 40–53)
HCT VFR BLD AUTO: 31.7 % (ref 40–53)
HCT VFR BLD AUTO: 31.8 % (ref 40–53)
HCT VFR BLD AUTO: 32.2 % (ref 40–53)
HCT VFR BLD AUTO: 32.3 % (ref 40–53)
HCT VFR BLD AUTO: 32.5 % (ref 40–53)
HCT VFR BLD AUTO: 32.7 % (ref 40–53)
HCT VFR BLD AUTO: 35.4 % (ref 40–53)
HCT VFR BLD AUTO: 36.5 % (ref 40–53)
HCT VFR BLD AUTO: 36.7 % (ref 40–53)
HCT VFR BLD AUTO: 37 % (ref 40–53)
HCT VFR BLD AUTO: 37.9 % (ref 40–53)
HCT VFR BLD AUTO: 43.7 % (ref 40–53)
HEMATOCRIT (EXTERNAL): 33.3 % (ref 38.8–50)
HEMATOCRIT (EXTERNAL): 36.4 % (ref 38.8–50)
HEMATOCRIT (EXTERNAL): 37.8 % (ref 38.8–50)
HEMOCCULT STL QL: NEGATIVE
HEMOGLOBIN (EXTERNAL): 10.4 G/DL (ref 13.5–17.5)
HEMOGLOBIN (EXTERNAL): 11.2 G/DL (ref 13.5–17.5)
HEMOGLOBIN (EXTERNAL): 11.7 G/DL (ref 13.5–17.5)
HGB BLD-MCNC: 10 G/DL (ref 13.3–17.7)
HGB BLD-MCNC: 10.1 G/DL (ref 13.3–17.7)
HGB BLD-MCNC: 10.2 G/DL (ref 13.3–17.7)
HGB BLD-MCNC: 10.3 G/DL (ref 13.3–17.7)
HGB BLD-MCNC: 10.4 G/DL (ref 13.3–17.7)
HGB BLD-MCNC: 10.8 G/DL (ref 13.3–17.7)
HGB BLD-MCNC: 11.4 G/DL (ref 13.3–17.7)
HGB BLD-MCNC: 11.4 G/DL (ref 13.3–17.7)
HGB BLD-MCNC: 11.6 G/DL (ref 13.3–17.7)
HGB BLD-MCNC: 12 G/DL (ref 13.3–17.7)
HGB BLD-MCNC: 12.2 G/DL (ref 13.3–17.7)
HGB BLD-MCNC: 13.5 G/DL (ref 13.3–17.7)
HGB BLD-MCNC: 7.1 G/DL (ref 13.3–17.7)
HGB BLD-MCNC: 7.5 G/DL (ref 13.3–17.7)
HGB BLD-MCNC: 8.3 G/DL (ref 13.3–17.7)
HGB BLD-MCNC: 8.6 G/DL (ref 13.3–17.7)
HGB BLD-MCNC: 8.7 G/DL (ref 13.3–17.7)
HGB BLD-MCNC: 8.7 G/DL (ref 13.3–17.7)
HGB BLD-MCNC: 8.8 G/DL (ref 13.3–17.7)
HGB BLD-MCNC: 8.8 G/DL (ref 13.3–17.7)
HGB BLD-MCNC: 9 G/DL (ref 13.3–17.7)
HGB BLD-MCNC: 9.2 G/DL (ref 13.3–17.7)
HGB BLD-MCNC: 9.3 G/DL (ref 13.3–17.7)
HGB BLD-MCNC: 9.4 G/DL (ref 13.3–17.7)
HGB BLD-MCNC: 9.4 G/DL (ref 13.3–17.7)
HGB BLD-MCNC: 9.8 G/DL (ref 13.3–17.7)
HGB BLD-MCNC: 9.8 G/DL (ref 13.3–17.7)
HGB BLD-MCNC: 9.9 G/DL (ref 13.3–17.7)
HGB UR QL STRIP: NEGATIVE
HOLD SPECIMEN: NORMAL
HYALINE CASTS: 3 /LPF
HYALINE CASTS: 6 /LPF
IGA SERPL-MCNC: 306 MG/DL (ref 84–499)
IGA SERPL-MCNC: 320 MG/DL (ref 84–499)
IGG SERPL-MCNC: 2276 MG/DL (ref 610–1616)
IGG SERPL-MCNC: 2590 MG/DL (ref 610–1616)
IGG SERPL-MCNC: 2773 MG/DL (ref 610–1616)
IGM SERPL-MCNC: 124 MG/DL (ref 35–242)
IGM SERPL-MCNC: 125 MG/DL (ref 35–242)
IMM GRANULOCYTES # BLD: 0.1 10E3/UL
IMM GRANULOCYTES # BLD: 0.2 10E3/UL
IMM GRANULOCYTES # BLD: 0.3 10E3/UL
IMM GRANULOCYTES # BLD: 0.4 10E3/UL
IMM GRANULOCYTES # BLD: 0.6 10E3/UL
IMM GRANULOCYTES NFR BLD: 1 %
IMM GRANULOCYTES NFR BLD: 2 %
IMM GRANULOCYTES NFR BLD: 3 %
IMM GRANULOCYTES NFR BLD: 3 %
IMM GRANULOCYTES NFR BLD: 4 %
IMMUNOLOGIST REVIEW: NORMAL
INTERPRETATION ECG - MUSE: NORMAL
INTERPRETATION: NORMAL
IRON SATN MFR SERPL: 18 % (ref 15–46)
IRON SERPL-MCNC: 36 UG/DL (ref 35–180)
ISCN: NORMAL
KAPPA LC FREE SER-MCNC: 12.96 MG/DL (ref 0.33–1.94)
KAPPA LC FREE SER-MCNC: 17.09 MG/DL (ref 0.33–1.94)
KAPPA LC FREE SER-MCNC: 18.09 MG/DL (ref 0.33–1.94)
KAPPA LC FREE/LAMBDA FREE SER NEPH: 3.76 {RATIO} (ref 0.26–1.65)
KAPPA LC FREE/LAMBDA FREE SER NEPH: 4.64 {RATIO} (ref 0.26–1.65)
KAPPA LC FREE/LAMBDA FREE SER NEPH: 4.81 {RATIO} (ref 0.26–1.65)
KETONES UR STRIP-MCNC: NEGATIVE MG/DL
L PNEUMO1 AG UR QL IA: NEGATIVE
L PNEUMO1 AG UR QL IA: NEGATIVE
LACTATE BLD-SCNC: 2.5 MMOL/L
LACTATE SERPL-SCNC: 0.5 MMOL/L (ref 0.7–2)
LACTATE SERPL-SCNC: 0.9 MMOL/L (ref 0.7–2)
LACTATE SERPL-SCNC: 1 MMOL/L (ref 0.7–2)
LACTATE SERPL-SCNC: 1 MMOL/L (ref 0.7–2)
LACTATE SERPL-SCNC: 1.1 MMOL/L (ref 0.7–2)
LACTATE SERPL-SCNC: 1.2 MMOL/L (ref 0.7–2)
LACTATE SERPL-SCNC: 1.2 MMOL/L (ref 0.7–2)
LACTATE SERPL-SCNC: 1.3 MMOL/L (ref 0.7–2)
LACTATE SERPL-SCNC: 1.4 MMOL/L (ref 0.7–2)
LACTATE SERPL-SCNC: 1.6 MMOL/L (ref 0.7–2)
LACTATE SERPL-SCNC: 1.6 MMOL/L (ref 0.7–2)
LACTATE SERPL-SCNC: 2.3 MMOL/L (ref 0.7–2)
LAMBDA LC FREE SERPL-MCNC: 3.45 MG/DL (ref 0.57–2.63)
LAMBDA LC FREE SERPL-MCNC: 3.55 MG/DL (ref 0.57–2.63)
LAMBDA LC FREE SERPL-MCNC: 3.9 MG/DL (ref 0.57–2.63)
LEUKOCYTE ESTERASE UR QL STRIP: NEGATIVE
LIPASE SERPL-CCNC: 47 U/L (ref 73–393)
LVEF ECHO: NORMAL
LVEF ECHO: NORMAL
LYMPHOCYTES # BLD AUTO: 1.2 10E3/UL (ref 0.8–5.3)
LYMPHOCYTES # BLD AUTO: 1.3 10E3/UL (ref 0.8–5.3)
LYMPHOCYTES # BLD AUTO: 1.4 10E3/UL (ref 0.8–5.3)
LYMPHOCYTES # BLD AUTO: 1.5 10E3/UL (ref 0.8–5.3)
LYMPHOCYTES # BLD AUTO: 1.5 10E3/UL (ref 0.8–5.3)
LYMPHOCYTES # BLD AUTO: 1.6 10E3/UL (ref 0.8–5.3)
LYMPHOCYTES # BLD AUTO: 1.7 10E3/UL (ref 0.8–5.3)
LYMPHOCYTES # BLD AUTO: 1.7 10E3/UL (ref 0.8–5.3)
LYMPHOCYTES # BLD AUTO: 1.8 10E3/UL (ref 0.8–5.3)
LYMPHOCYTES # BLD AUTO: 1.8 10E3/UL (ref 0.8–5.3)
LYMPHOCYTES # BLD AUTO: 1.9 10E3/UL (ref 0.8–5.3)
LYMPHOCYTES # BLD AUTO: 2 10E3/UL (ref 0.8–5.3)
LYMPHOCYTES # BLD AUTO: 2.1 10E3/UL (ref 0.8–5.3)
LYMPHOCYTES NFR BLD AUTO: 10 %
LYMPHOCYTES NFR BLD AUTO: 11 %
LYMPHOCYTES NFR BLD AUTO: 12 %
LYMPHOCYTES NFR BLD AUTO: 13 %
LYMPHOCYTES NFR BLD AUTO: 13 %
LYMPHOCYTES NFR BLD AUTO: 14 %
LYMPHOCYTES NFR BLD AUTO: 7 %
LYMPHOCYTES NFR BLD AUTO: 7 %
LYMPHOCYTES NFR BLD AUTO: 8 %
LYMPHOCYTES NFR BLD AUTO: 9 %
M PROTEIN SERPL ELPH-MCNC: 1.3 G/DL
M PROTEIN SERPL ELPH-MCNC: 1.4 G/DL
M PROTEIN SERPL ELPH-MCNC: 1.5 G/DL
M TB IFN-G BLD-IMP: NEGATIVE
M TB IFN-G CD4+ BCKGRND COR BLD-ACNC: 4.61 IU/ML
MAGNESIUM SERPL-MCNC: 1.3 MG/DL (ref 1.6–2.3)
MAGNESIUM SERPL-MCNC: 1.6 MG/DL (ref 1.6–2.3)
MAGNESIUM SERPL-MCNC: 1.8 MG/DL (ref 1.6–2.3)
MAGNESIUM SERPL-MCNC: 1.9 MG/DL (ref 1.6–2.3)
MAGNESIUM SERPL-MCNC: 2 MG/DL (ref 1.6–2.3)
MAGNESIUM SERPL-MCNC: 2.1 MG/DL (ref 1.6–2.3)
MAGNESIUM SERPL-MCNC: 2.2 MG/DL (ref 1.6–2.3)
MAGNESIUM SERPL-MCNC: 2.3 MG/DL (ref 1.6–2.3)
MAGNESIUM SERPL-MCNC: 2.7 MG/DL (ref 1.6–2.3)
MAGNESIUM SERPL-MCNC: 2.8 MG/DL (ref 1.6–2.3)
MAYO MISC RESULT: NORMAL
MCH RBC QN AUTO: 30.8 PG (ref 26.5–33)
MCH RBC QN AUTO: 30.9 PG (ref 26.5–33)
MCH RBC QN AUTO: 31 PG (ref 26.5–33)
MCH RBC QN AUTO: 31 PG (ref 27.6–33.3)
MCH RBC QN AUTO: 31.1 PG (ref 26.5–33)
MCH RBC QN AUTO: 31.2 PG (ref 26.5–33)
MCH RBC QN AUTO: 31.3 PG (ref 27.6–33.3)
MCH RBC QN AUTO: 31.4 PG (ref 26.5–33)
MCH RBC QN AUTO: 31.4 PG (ref 26.5–33)
MCH RBC QN AUTO: 31.4 PG (ref 27.6–33.3)
MCH RBC QN AUTO: 31.5 PG (ref 26.5–33)
MCH RBC QN AUTO: 31.6 PG (ref 26.5–33)
MCH RBC QN AUTO: 31.7 PG (ref 26.5–33)
MCH RBC QN AUTO: 31.9 PG (ref 26.5–33)
MCH RBC QN AUTO: 32 PG (ref 26.5–33)
MCH RBC QN AUTO: 32.1 PG (ref 26.5–33)
MCH RBC QN AUTO: 32.2 PG (ref 26.5–33)
MCH RBC QN AUTO: 32.2 PG (ref 26.5–33)
MCH RBC QN AUTO: 32.3 PG (ref 26.5–33)
MCH RBC QN AUTO: 32.7 PG (ref 26.5–33)
MCH RBC QN AUTO: 33 PG (ref 26.5–33)
MCH RBC QN AUTO: 33.2 PG (ref 26.5–33)
MCHC RBC AUTO-ENTMCNC: 30.7 G/DL (ref 31.5–36.5)
MCHC RBC AUTO-ENTMCNC: 30.7 G/DL (ref 31.5–36.5)
MCHC RBC AUTO-ENTMCNC: 30.8 G/DL (ref 31.5–35.2)
MCHC RBC AUTO-ENTMCNC: 30.9 G/DL (ref 31.5–36.5)
MCHC RBC AUTO-ENTMCNC: 31 G/DL (ref 31.5–35.2)
MCHC RBC AUTO-ENTMCNC: 31 G/DL (ref 31.5–36.5)
MCHC RBC AUTO-ENTMCNC: 31.2 G/DL (ref 31.5–35.2)
MCHC RBC AUTO-ENTMCNC: 31.2 G/DL (ref 31.5–36.5)
MCHC RBC AUTO-ENTMCNC: 31.2 G/DL (ref 31.5–36.5)
MCHC RBC AUTO-ENTMCNC: 31.4 G/DL (ref 31.5–36.5)
MCHC RBC AUTO-ENTMCNC: 31.5 G/DL (ref 31.5–36.5)
MCHC RBC AUTO-ENTMCNC: 31.6 G/DL (ref 31.5–36.5)
MCHC RBC AUTO-ENTMCNC: 31.6 G/DL (ref 31.5–36.5)
MCHC RBC AUTO-ENTMCNC: 31.7 G/DL (ref 31.5–36.5)
MCHC RBC AUTO-ENTMCNC: 31.8 G/DL (ref 31.5–36.5)
MCHC RBC AUTO-ENTMCNC: 31.8 G/DL (ref 31.5–36.5)
MCHC RBC AUTO-ENTMCNC: 31.9 G/DL (ref 31.5–36.5)
MCHC RBC AUTO-ENTMCNC: 32 G/DL (ref 31.5–36.5)
MCHC RBC AUTO-ENTMCNC: 32.1 G/DL (ref 31.5–36.5)
MCHC RBC AUTO-ENTMCNC: 32.1 G/DL (ref 31.5–36.5)
MCHC RBC AUTO-ENTMCNC: 32.2 G/DL (ref 31.5–36.5)
MCHC RBC AUTO-ENTMCNC: 32.3 G/DL (ref 31.5–36.5)
MCHC RBC AUTO-ENTMCNC: 32.4 G/DL (ref 31.5–36.5)
MCHC RBC AUTO-ENTMCNC: 33 G/DL (ref 31.5–36.5)
MCV RBC AUTO: 100 FL (ref 78–100)
MCV RBC AUTO: 100.3 FL (ref 80–100)
MCV RBC AUTO: 100.3 FL (ref 80–100)
MCV RBC AUTO: 101 FL (ref 78–100)
MCV RBC AUTO: 102 FL (ref 78–100)
MCV RBC AUTO: 102 FL (ref 80–100)
MCV RBC AUTO: 103 FL (ref 78–100)
MCV RBC AUTO: 106 FL (ref 78–100)
MCV RBC AUTO: 107 FL (ref 78–100)
MCV RBC AUTO: 96 FL (ref 78–100)
MCV RBC AUTO: 96 FL (ref 78–100)
MCV RBC AUTO: 97 FL (ref 78–100)
MCV RBC AUTO: 97 FL (ref 78–100)
MCV RBC AUTO: 98 FL (ref 78–100)
MCV RBC AUTO: 99 FL (ref 78–100)
MCV RBC AUTO: 99 FL (ref 78–100)
METHODS: NORMAL
MITOGEN IGNF BCKGRD COR BLD-ACNC: 0 IU/ML
MITOGEN IGNF BCKGRD COR BLD-ACNC: 0 IU/ML
MONOCYTES # BLD AUTO: 0.9 10E3/UL (ref 0–1.3)
MONOCYTES # BLD AUTO: 0.9 10E3/UL (ref 0–1.3)
MONOCYTES # BLD AUTO: 1 10E3/UL (ref 0–1.3)
MONOCYTES # BLD AUTO: 1.1 10E3/UL (ref 0–1.3)
MONOCYTES # BLD AUTO: 1.2 10E3/UL (ref 0–1.3)
MONOCYTES # BLD AUTO: 1.4 10E3/UL (ref 0–1.3)
MONOCYTES # BLD AUTO: 1.5 10E3/UL (ref 0–1.3)
MONOCYTES # BLD AUTO: 1.6 10E3/UL (ref 0–1.3)
MONOCYTES # BLD AUTO: 1.6 10E3/UL (ref 0–1.3)
MONOCYTES # BLD AUTO: 1.7 10E3/UL (ref 0–1.3)
MONOCYTES # BLD AUTO: 1.7 10E3/UL (ref 0–1.3)
MONOCYTES NFR BLD AUTO: 10 %
MONOCYTES NFR BLD AUTO: 11 %
MONOCYTES NFR BLD AUTO: 11 %
MONOCYTES NFR BLD AUTO: 5 %
MONOCYTES NFR BLD AUTO: 6 %
MONOCYTES NFR BLD AUTO: 7 %
MONOCYTES NFR BLD AUTO: 8 %
MONOCYTES NFR BLD AUTO: 9 %
MUCOUS THREADS #/AREA URNS LPF: PRESENT /LPF
NEUTROPHILS # BLD AUTO: 10.8 10E3/UL (ref 1.6–8.3)
NEUTROPHILS # BLD AUTO: 11.3 10E3/UL (ref 1.6–8.3)
NEUTROPHILS # BLD AUTO: 11.5 10E3/UL (ref 1.6–8.3)
NEUTROPHILS # BLD AUTO: 11.5 10E3/UL (ref 1.6–8.3)
NEUTROPHILS # BLD AUTO: 11.7 10E3/UL (ref 1.6–8.3)
NEUTROPHILS # BLD AUTO: 13 10E3/UL (ref 1.6–8.3)
NEUTROPHILS # BLD AUTO: 13.8 10E3/UL (ref 1.6–8.3)
NEUTROPHILS # BLD AUTO: 14.7 10E3/UL (ref 1.6–8.3)
NEUTROPHILS # BLD AUTO: 17.2 10E3/UL (ref 1.6–8.3)
NEUTROPHILS # BLD AUTO: 17.7 10E3/UL (ref 1.6–8.3)
NEUTROPHILS # BLD AUTO: 7.1 10E3/UL (ref 1.6–8.3)
NEUTROPHILS # BLD AUTO: 7.3 10E3/UL (ref 1.6–8.3)
NEUTROPHILS # BLD AUTO: 7.7 10E3/UL (ref 1.6–8.3)
NEUTROPHILS # BLD AUTO: 8.9 10E3/UL (ref 1.6–8.3)
NEUTROPHILS # BLD AUTO: 8.9 10E3/UL (ref 1.6–8.3)
NEUTROPHILS # BLD AUTO: 9 10E3/UL (ref 1.6–8.3)
NEUTROPHILS # BLD AUTO: 9.4 10E3/UL (ref 1.6–8.3)
NEUTROPHILS NFR BLD AUTO: 69 %
NEUTROPHILS NFR BLD AUTO: 69 %
NEUTROPHILS NFR BLD AUTO: 70 %
NEUTROPHILS NFR BLD AUTO: 71 %
NEUTROPHILS NFR BLD AUTO: 73 %
NEUTROPHILS NFR BLD AUTO: 74 %
NEUTROPHILS NFR BLD AUTO: 75 %
NEUTROPHILS NFR BLD AUTO: 76 %
NEUTROPHILS NFR BLD AUTO: 77 %
NEUTROPHILS NFR BLD AUTO: 77 %
NEUTROPHILS NFR BLD AUTO: 79 %
NEUTROPHILS NFR BLD AUTO: 80 %
NEUTROPHILS NFR BLD AUTO: 80 %
NEUTROPHILS NFR BLD AUTO: 81 %
NEUTROPHILS NFR BLD AUTO: 85 %
NITRATE UR QL: NEGATIVE
NOROV GI+II ORF1-ORF2 JNC STL QL NAA+PR: NOT DETECTED
NRBC # BLD AUTO: 0 10E3/UL
NRBC BLD AUTO-RTO: 0 /100
NT-PROBNP SERPL-MCNC: 4330 PG/ML (ref 0–1800)
NT-PROBNP SERPL-MCNC: 4554 PG/ML (ref 0–1800)
NT-PROBNP SERPL-MCNC: 4909 PG/ML (ref 0–1800)
P AXIS - MUSE: NORMAL DEGREES
PARANEOPLASTIC AB SER QL IF: NORMAL
PATH REPORT.COMMENTS IMP SPEC: ABNORMAL
PATH REPORT.COMMENTS IMP SPEC: NORMAL
PATH REPORT.COMMENTS IMP SPEC: YES
PATH REPORT.FINAL DX SPEC: ABNORMAL
PATH REPORT.FINAL DX SPEC: NORMAL
PATH REPORT.MICROSCOPIC SPEC OTHER STN: ABNORMAL
PATH REPORT.MICROSCOPIC SPEC OTHER STN: NORMAL
PATH REPORT.RELEVANT HX SPEC: ABNORMAL
PATH REPORT.RELEVANT HX SPEC: NORMAL
PCO2 BLDV: 36 MM HG (ref 40–50)
PH BLDV: 7.45 [PH] (ref 7.32–7.43)
PH UR STRIP: 5.5 [PH] (ref 5–7)
PH UR STRIP: 5.5 [PH] (ref 5–7)
PH UR STRIP: 6 [PH] (ref 5–7)
PH UR STRIP: 6 [PH] (ref 5–7)
PH UR STRIP: 8 [PH] (ref 5–7)
PHOSPHATE SERPL-MCNC: 2 MG/DL (ref 2.5–4.5)
PHOSPHATE SERPL-MCNC: 2.1 MG/DL (ref 2.5–4.5)
PHOSPHATE SERPL-MCNC: 2.3 MG/DL (ref 2.5–4.5)
PHOSPHATE SERPL-MCNC: 2.4 MG/DL (ref 2.5–4.5)
PHOSPHATE SERPL-MCNC: 2.5 MG/DL (ref 2.5–4.5)
PHOSPHATE SERPL-MCNC: 2.9 MG/DL (ref 2.5–4.5)
PHOSPHATE SERPL-MCNC: 2.9 MG/DL (ref 2.5–4.5)
PHOSPHATE SERPL-MCNC: 3.2 MG/DL (ref 2.5–4.5)
PHOSPHATE SERPL-MCNC: 3.2 MG/DL (ref 2.5–4.5)
PHOSPHATE SERPL-MCNC: 3.3 MG/DL (ref 2.5–4.5)
PHOSPHATE SERPL-MCNC: 3.5 MG/DL (ref 2.5–4.5)
PHOSPHATE SERPL-MCNC: 3.6 MG/DL (ref 2.5–4.5)
PHOSPHATE SERPL-MCNC: 4 MG/DL (ref 2.5–4.5)
PHOSPHATE SERPL-MCNC: 4.1 MG/DL (ref 2.5–4.5)
PHOSPHATE SERPL-MCNC: 4.5 MG/DL (ref 2.5–4.5)
PHOSPHATE SERPL-MCNC: 4.7 MG/DL (ref 2.5–4.5)
PLATELET # BLD AUTO: 296 10E3/UL (ref 150–450)
PLATELET # BLD AUTO: 303 10E3/UL (ref 150–450)
PLATELET # BLD AUTO: 306 10E3/UL (ref 150–450)
PLATELET # BLD AUTO: 310 10E3/UL (ref 150–450)
PLATELET # BLD AUTO: 313 10E3/UL (ref 150–450)
PLATELET # BLD AUTO: 333 10E3/UL (ref 150–450)
PLATELET # BLD AUTO: 335 10E3/UL (ref 150–450)
PLATELET # BLD AUTO: 339 10E3/UL (ref 150–450)
PLATELET # BLD AUTO: 344 10E3/UL (ref 150–450)
PLATELET # BLD AUTO: 364 10E3/UL (ref 150–450)
PLATELET # BLD AUTO: 368 10E3/UL (ref 150–450)
PLATELET # BLD AUTO: 371 10E3/UL (ref 150–450)
PLATELET # BLD AUTO: 371 10E3/UL (ref 150–450)
PLATELET # BLD AUTO: 382 10E3/UL (ref 150–450)
PLATELET # BLD AUTO: 384 10E3/UL (ref 150–450)
PLATELET # BLD AUTO: 394 10E3/UL (ref 150–450)
PLATELET # BLD AUTO: 394 10E3/UL (ref 150–450)
PLATELET # BLD AUTO: 399 10E3/UL (ref 150–450)
PLATELET # BLD AUTO: 416 10E3/UL (ref 150–450)
PLATELET # BLD AUTO: 420 10E3/UL (ref 150–450)
PLATELET # BLD AUTO: 422 10E3/UL (ref 150–450)
PLATELET # BLD AUTO: 448 10E3/UL (ref 150–450)
PLATELET # BLD AUTO: 450 10E3/UL (ref 150–450)
PLATELET # BLD AUTO: 464 10E3/UL (ref 150–450)
PLATELET # BLD AUTO: 469 10E3/UL (ref 150–450)
PLATELET # BLD AUTO: 490 10E3/UL (ref 150–450)
PLATELET # BLD AUTO: 496 10E3/UL (ref 150–450)
PLATELET # BLD AUTO: 514 10E3/UL (ref 150–450)
PLATELET # BLD AUTO: 518 10E3/UL (ref 150–450)
PLATELET # BLD AUTO: 526 10E3/UL (ref 150–450)
PLATELET # BLD AUTO: 527 10E3/UL (ref 150–450)
PLATELET # BLD AUTO: 602 10E3/UL (ref 150–450)
PLATELET COUNT (EXTERNAL): 346 10(9)/L (ref 150–450)
PLATELET COUNT (EXTERNAL): 374 X10(9)/L (ref 150–450)
PLATELET COUNT (EXTERNAL): 406 10(9)/L (ref 150–450)
PO2 BLDV: 35 MM HG (ref 25–47)
POTASSIUM (EXTERNAL): 3.9 MMOL/L (ref 3.5–5.1)
POTASSIUM (EXTERNAL): 4.5 MMOL/L (ref 3.5–5.1)
POTASSIUM (EXTERNAL): 4.6 MMOL/L (ref 3.5–5.1)
POTASSIUM BLD-SCNC: 2.7 MMOL/L (ref 3.4–5.3)
POTASSIUM BLD-SCNC: 2.7 MMOL/L (ref 3.4–5.3)
POTASSIUM BLD-SCNC: 2.9 MMOL/L (ref 3.4–5.3)
POTASSIUM BLD-SCNC: 2.9 MMOL/L (ref 3.4–5.3)
POTASSIUM BLD-SCNC: 3 MMOL/L (ref 3.4–5.3)
POTASSIUM BLD-SCNC: 3 MMOL/L (ref 3.4–5.3)
POTASSIUM BLD-SCNC: 3.1 MMOL/L (ref 3.4–5.3)
POTASSIUM BLD-SCNC: 3.2 MMOL/L (ref 3.4–5.3)
POTASSIUM BLD-SCNC: 3.3 MMOL/L (ref 3.4–5.3)
POTASSIUM BLD-SCNC: 3.4 MMOL/L (ref 3.4–5.3)
POTASSIUM BLD-SCNC: 3.5 MMOL/L (ref 3.4–5.3)
POTASSIUM BLD-SCNC: 3.6 MMOL/L (ref 3.4–5.3)
POTASSIUM BLD-SCNC: 3.7 MMOL/L (ref 3.4–5.3)
POTASSIUM BLD-SCNC: 3.8 MMOL/L (ref 3.4–5.3)
POTASSIUM BLD-SCNC: 3.9 MMOL/L (ref 3.4–5.3)
POTASSIUM BLD-SCNC: 4 MMOL/L (ref 3.4–5.3)
POTASSIUM BLD-SCNC: 4.1 MMOL/L (ref 3.4–5.3)
POTASSIUM BLD-SCNC: 4.1 MMOL/L (ref 3.4–5.3)
POTASSIUM BLD-SCNC: 4.4 MMOL/L (ref 3.4–5.3)
POTASSIUM BLD-SCNC: 4.5 MMOL/L (ref 3.4–5.3)
POTASSIUM BLD-SCNC: 4.5 MMOL/L (ref 3.4–5.3)
PR INTERVAL - MUSE: NORMAL MS
PROCALCITONIN SERPL-MCNC: 0.32 NG/ML
PROCALCITONIN SERPL-MCNC: <0.05 NG/ML
PROT PATTERN SERPL ELPH-IMP: ABNORMAL
PROT PATTERN SERPL IFE-IMP: NORMAL
PROT PATTERN SERPL IFE-IMP: NORMAL
PROT SERPL-MCNC: 6.9 G/DL (ref 6.8–8.8)
PROT SERPL-MCNC: 7.1 G/DL (ref 6.8–8.8)
PROT SERPL-MCNC: 7.7 G/DL (ref 6.8–8.8)
PROT SERPL-MCNC: 7.9 G/DL (ref 6.8–8.8)
PROT SERPL-MCNC: 8.2 G/DL (ref 6.8–8.8)
PROT SERPL-MCNC: 8.3 G/DL (ref 6.8–8.8)
PROT SERPL-MCNC: 8.4 G/DL (ref 6.8–8.8)
PROTEIN TOTAL (EXTERNAL): 7.6 G/DL (ref 6.4–8.3)
QRS DURATION - MUSE: 102 MS
QRS DURATION - MUSE: 104 MS
QRS DURATION - MUSE: 112 MS
QRS DURATION - MUSE: 96 MS
QT - MUSE: 302 MS
QT - MUSE: 330 MS
QT - MUSE: 378 MS
QT - MUSE: 404 MS
QTC - MUSE: 395 MS
QTC - MUSE: 410 MS
QTC - MUSE: 435 MS
QTC - MUSE: 464 MS
QUANTIFERON MITOGEN: 4.63 IU/ML
QUANTIFERON NIL TUBE: 0.02 IU/ML
QUANTIFERON TB1 TUBE: 0.02 IU/ML
QUANTIFERON TB2 TUBE: 0.02
R AXIS - MUSE: -57 DEGREES
R AXIS - MUSE: -64 DEGREES
R AXIS - MUSE: -70 DEGREES
R AXIS - MUSE: -70 DEGREES
RBC # BLD AUTO: 2.14 10E6/UL (ref 4.4–5.9)
RBC # BLD AUTO: 2.27 10E6/UL (ref 4.4–5.9)
RBC # BLD AUTO: 2.63 10E6/UL (ref 4.4–5.9)
RBC # BLD AUTO: 2.69 10E6/UL (ref 4.4–5.9)
RBC # BLD AUTO: 2.72 10E6/UL (ref 4.4–5.9)
RBC # BLD AUTO: 2.72 10E6/UL (ref 4.4–5.9)
RBC # BLD AUTO: 2.76 10E6/UL (ref 4.4–5.9)
RBC # BLD AUTO: 2.78 10E6/UL (ref 4.4–5.9)
RBC # BLD AUTO: 2.8 10E6/UL (ref 4.4–5.9)
RBC # BLD AUTO: 2.82 10E6/UL (ref 4.4–5.9)
RBC # BLD AUTO: 2.86 10E6/UL (ref 4.4–5.9)
RBC # BLD AUTO: 2.92 10E6/UL (ref 4.4–5.9)
RBC # BLD AUTO: 2.93 10E6/UL (ref 4.4–5.9)
RBC # BLD AUTO: 3 10E6/UL (ref 4.4–5.9)
RBC # BLD AUTO: 3.02 10E6/UL (ref 4.4–5.9)
RBC # BLD AUTO: 3.14 10E6/UL (ref 4.4–5.9)
RBC # BLD AUTO: 3.16 10E6/UL (ref 4.4–5.9)
RBC # BLD AUTO: 3.16 10E6/UL (ref 4.4–5.9)
RBC # BLD AUTO: 3.18 10E6/UL (ref 4.4–5.9)
RBC # BLD AUTO: 3.19 10E6/UL (ref 4.4–5.9)
RBC # BLD AUTO: 3.2 10E6/UL (ref 4.4–5.9)
RBC # BLD AUTO: 3.23 10E6/UL (ref 4.4–5.9)
RBC # BLD AUTO: 3.24 10E6/UL (ref 4.4–5.9)
RBC # BLD AUTO: 3.25 10E6/UL (ref 4.4–5.9)
RBC # BLD AUTO: 3.32 10(12)/L (ref 4.32–5.72)
RBC # BLD AUTO: 3.35 10E6/UL (ref 4.4–5.9)
RBC # BLD AUTO: 3.35 10E6/UL (ref 4.4–5.9)
RBC # BLD AUTO: 3.53 10E6/UL (ref 4.4–5.9)
RBC # BLD AUTO: 3.56 10E6/UL (ref 4.4–5.9)
RBC # BLD AUTO: 3.57 X10(12)/L (ref 4.32–5.72)
RBC # BLD AUTO: 3.67 10E6/UL (ref 4.4–5.9)
RBC # BLD AUTO: 3.77 10(12)/L (ref 4.32–5.72)
RBC # BLD AUTO: 3.78 10E6/UL (ref 4.4–5.9)
RBC # BLD AUTO: 3.95 10E6/UL (ref 4.4–5.9)
RBC # BLD AUTO: 4.26 10E6/UL (ref 4.4–5.9)
RBC URINE: 1 /HPF
RBC URINE: 2 /HPF
RBC URINE: 3 /HPF
RBC URINE: <1 /HPF
RETICS # AUTO: 0.06 10E6/UL (ref 0.03–0.1)
RETICS # AUTO: 0.06 10E6/UL (ref 0.03–0.1)
RETICS # AUTO: 0.07 10E6/UL (ref 0.03–0.1)
RETICS # AUTO: 0.07 10E6/UL (ref 0.03–0.1)
RETICS/RBC NFR AUTO: 1.4 % (ref 0.5–2)
RETICS/RBC NFR AUTO: 1.6 % (ref 0.5–2)
RETICS/RBC NFR AUTO: 1.8 % (ref 0.5–2)
RETICS/RBC NFR AUTO: 1.9 % (ref 0.5–2)
RSV RNA SPEC NAA+PROBE: NEGATIVE
RSV RNA SPEC NAA+PROBE: NEGATIVE
RVA NSP5 STL QL NAA+PROBE: NOT DETECTED
S PNEUM AG SPEC QL: NEGATIVE
SALMONELLA SP RPOD STL QL NAA+PROBE: NOT DETECTED
SAO2 % BLDV: 70 % (ref 94–100)
SARS-COV-2 RNA RESP QL NAA+PROBE: NEGATIVE
SHIGELLA SP+EIEC IPAH STL QL NAA+PROBE: NOT DETECTED
SODIUM (EXTERNAL): 139 MMOL/L (ref 136–145)
SODIUM (EXTERNAL): 140 MMOL/L (ref 136–145)
SODIUM (EXTERNAL): 140 MMOL/L (ref 136–145)
SODIUM SERPL-SCNC: 132 MMOL/L (ref 133–144)
SODIUM SERPL-SCNC: 132 MMOL/L (ref 133–144)
SODIUM SERPL-SCNC: 133 MMOL/L (ref 133–144)
SODIUM SERPL-SCNC: 134 MMOL/L (ref 133–144)
SODIUM SERPL-SCNC: 135 MMOL/L (ref 133–144)
SODIUM SERPL-SCNC: 135 MMOL/L (ref 133–144)
SODIUM SERPL-SCNC: 136 MMOL/L (ref 133–144)
SODIUM SERPL-SCNC: 137 MMOL/L (ref 133–144)
SODIUM SERPL-SCNC: 138 MMOL/L (ref 133–144)
SODIUM SERPL-SCNC: 139 MMOL/L (ref 133–144)
SODIUM SERPL-SCNC: 140 MMOL/L (ref 133–144)
SODIUM SERPL-SCNC: 141 MMOL/L (ref 133–144)
SODIUM SERPL-SCNC: 141 MMOL/L (ref 133–144)
SODIUM SERPL-SCNC: 142 MMOL/L (ref 133–144)
SODIUM SERPL-SCNC: 143 MMOL/L (ref 133–144)
SP GR UR STRIP: 1.01 (ref 1–1.03)
SP GR UR STRIP: 1.02 (ref 1–1.03)
SP GR UR STRIP: 1.02 (ref 1–1.03)
SYSTOLIC BLOOD PRESSURE - MUSE: NORMAL MMHG
T AXIS - MUSE: 25 DEGREES
T AXIS - MUSE: 35 DEGREES
T AXIS - MUSE: 55 DEGREES
T AXIS - MUSE: 67 DEGREES
TIBC SERPL-MCNC: 202 UG/DL (ref 240–430)
TOTAL PROTEIN SERUM FOR ELP: 7.3 G/DL (ref 6.8–8.8)
TOTAL PROTEIN SERUM FOR ELP: 7.6 G/DL (ref 6.8–8.8)
TOTAL PROTEIN SERUM FOR ELP: 8 G/DL (ref 6.8–8.8)
TROPONIN I SERPL HS-MCNC: 20 NG/L
TROPONIN I SERPL HS-MCNC: 32 NG/L
TROPONIN I SERPL HS-MCNC: 34 NG/L
TSH SERPL DL<=0.005 MIU/L-ACNC: 2.58 MU/L (ref 0.4–4)
TSH SERPL DL<=0.005 MIU/L-ACNC: 3.74 MU/L (ref 0.4–4)
UROBILINOGEN UR STRIP-ACNC: 0.2 E.U./DL
UROBILINOGEN UR STRIP-MCNC: NORMAL MG/DL
V CHOL+PARA RFBL+TRKH+TNAA STL QL NAA+PR: NOT DETECTED
VENTRICULAR RATE- MUSE: 103 BPM
VENTRICULAR RATE- MUSE: 119 BPM
VENTRICULAR RATE- MUSE: 62 BPM
VENTRICULAR RATE- MUSE: 80 BPM
VIT B12 SERPL-MCNC: 1028 PG/ML (ref 193–986)
VIT B12 SERPL-MCNC: 1043 PG/ML (ref 193–986)
WBC # BLD AUTO: 10 10E3/UL (ref 4–11)
WBC # BLD AUTO: 10.5 10E3/UL (ref 4–11)
WBC # BLD AUTO: 10.7 10E3/UL (ref 4–11)
WBC # BLD AUTO: 11 10E3/UL (ref 4–11)
WBC # BLD AUTO: 11.3 10E3/UL (ref 4–11)
WBC # BLD AUTO: 11.8 10E3/UL (ref 4–11)
WBC # BLD AUTO: 11.9 10E3/UL (ref 4–11)
WBC # BLD AUTO: 12 10E3/UL (ref 4–11)
WBC # BLD AUTO: 12.3 10E3/UL (ref 4–11)
WBC # BLD AUTO: 12.7 10E3/UL (ref 4–11)
WBC # BLD AUTO: 13.1 10E3/UL (ref 4–11)
WBC # BLD AUTO: 13.2 10E3/UL (ref 4–11)
WBC # BLD AUTO: 13.2 10E3/UL (ref 4–11)
WBC # BLD AUTO: 14 10E3/UL (ref 4–11)
WBC # BLD AUTO: 14.1 10E3/UL (ref 4–11)
WBC # BLD AUTO: 14.1 10E3/UL (ref 4–11)
WBC # BLD AUTO: 14.4 10E3/UL (ref 4–11)
WBC # BLD AUTO: 14.4 10E3/UL (ref 4–11)
WBC # BLD AUTO: 15.1 10E3/UL (ref 4–11)
WBC # BLD AUTO: 15.2 10E3/UL (ref 4–11)
WBC # BLD AUTO: 15.2 10E3/UL (ref 4–11)
WBC # BLD AUTO: 15.3 10E3/UL (ref 4–11)
WBC # BLD AUTO: 15.5 10E3/UL (ref 4–11)
WBC # BLD AUTO: 15.8 10E3/UL (ref 4–11)
WBC # BLD AUTO: 15.9 10E3/UL (ref 4–11)
WBC # BLD AUTO: 16.1 10E3/UL (ref 4–11)
WBC # BLD AUTO: 16.8 10E3/UL (ref 4–11)
WBC # BLD AUTO: 17.2 10E3/UL (ref 4–11)
WBC # BLD AUTO: 17.3 10E3/UL (ref 4–11)
WBC # BLD AUTO: 18.3 10E3/UL (ref 4–11)
WBC # BLD AUTO: 21.1 10E3/UL (ref 4–11)
WBC # BLD AUTO: 21.7 10E3/UL (ref 4–11)
WBC # BLD AUTO: 9.2 10E3/UL (ref 4–11)
WBC COUNT (EXTERNAL): 10.6 (ref 3.5–10.5)
WBC COUNT (EXTERNAL): 11.9 10(9)/L (ref 3.5–10.5)
WBC COUNT (EXTERNAL): 13 10(9)/L (ref 3.5–10.5)
WBC URINE: 1 /HPF
WBC URINE: <1 /HPF
Y ENTERO RECN STL QL NAA+PROBE: NOT DETECTED

## 2022-01-01 PROCEDURE — 85652 RBC SED RATE AUTOMATED: CPT | Performed by: INTERNAL MEDICINE

## 2022-01-01 PROCEDURE — 99233 SBSQ HOSP IP/OBS HIGH 50: CPT | Performed by: INTERNAL MEDICINE

## 2022-01-01 PROCEDURE — 250N000013 HC RX MED GY IP 250 OP 250 PS 637: Performed by: PHYSICIAN ASSISTANT

## 2022-01-01 PROCEDURE — 250N000013 HC RX MED GY IP 250 OP 250 PS 637: Performed by: INTERNAL MEDICINE

## 2022-01-01 PROCEDURE — 87040 BLOOD CULTURE FOR BACTERIA: CPT | Performed by: EMERGENCY MEDICINE

## 2022-01-01 PROCEDURE — 250N000011 HC RX IP 250 OP 636: Performed by: HOSPITALIST

## 2022-01-01 PROCEDURE — 250N000013 HC RX MED GY IP 250 OP 250 PS 637

## 2022-01-01 PROCEDURE — 250N000009 HC RX 250: Performed by: NURSE ANESTHETIST, CERTIFIED REGISTERED

## 2022-01-01 PROCEDURE — 258N000003 HC RX IP 258 OP 636: Performed by: EMERGENCY MEDICINE

## 2022-01-01 PROCEDURE — 120N000001 HC R&B MED SURG/OB

## 2022-01-01 PROCEDURE — 85025 COMPLETE CBC W/AUTO DIFF WBC: CPT | Performed by: PATHOLOGY

## 2022-01-01 PROCEDURE — 36415 COLL VENOUS BLD VENIPUNCTURE: CPT | Performed by: INTERNAL MEDICINE

## 2022-01-01 PROCEDURE — 84100 ASSAY OF PHOSPHORUS: CPT | Performed by: INTERNAL MEDICINE

## 2022-01-01 PROCEDURE — 85004 AUTOMATED DIFF WBC COUNT: CPT | Performed by: INTERNAL MEDICINE

## 2022-01-01 PROCEDURE — 86255 FLUORESCENT ANTIBODY SCREEN: CPT | Performed by: PSYCHIATRY & NEUROLOGY

## 2022-01-01 PROCEDURE — 99233 SBSQ HOSP IP/OBS HIGH 50: CPT | Performed by: STUDENT IN AN ORGANIZED HEALTH CARE EDUCATION/TRAINING PROGRAM

## 2022-01-01 PROCEDURE — 97162 PT EVAL MOD COMPLEX 30 MIN: CPT | Mod: GP | Performed by: PHYSICAL THERAPIST

## 2022-01-01 PROCEDURE — 85027 COMPLETE CBC AUTOMATED: CPT | Performed by: INTERNAL MEDICINE

## 2022-01-01 PROCEDURE — U0003 INFECTIOUS AGENT DETECTION BY NUCLEIC ACID (DNA OR RNA); SEVERE ACUTE RESPIRATORY SYNDROME CORONAVIRUS 2 (SARS-COV-2) (CORONAVIRUS DISEASE [COVID-19]), AMPLIFIED PROBE TECHNIQUE, MAKING USE OF HIGH THROUGHPUT TECHNOLOGIES AS DESCRIBED BY CMS-2020-01-R: HCPCS | Performed by: INTERNAL MEDICINE

## 2022-01-01 PROCEDURE — 250N000013 HC RX MED GY IP 250 OP 250 PS 637: Performed by: HOSPITALIST

## 2022-01-01 PROCEDURE — 250N000011 HC RX IP 250 OP 636: Performed by: INTERNAL MEDICINE

## 2022-01-01 PROCEDURE — 258N000001 HC RX 258: Performed by: SURGERY

## 2022-01-01 PROCEDURE — 84132 ASSAY OF SERUM POTASSIUM: CPT | Performed by: INTERNAL MEDICINE

## 2022-01-01 PROCEDURE — 99232 SBSQ HOSP IP/OBS MODERATE 35: CPT | Performed by: INTERNAL MEDICINE

## 2022-01-01 PROCEDURE — 97161 PT EVAL LOW COMPLEX 20 MIN: CPT | Mod: GP

## 2022-01-01 PROCEDURE — 97530 THERAPEUTIC ACTIVITIES: CPT | Mod: GP | Performed by: PHYSICAL THERAPIST

## 2022-01-01 PROCEDURE — 36415 COLL VENOUS BLD VENIPUNCTURE: CPT | Performed by: HOSPITALIST

## 2022-01-01 PROCEDURE — 82310 ASSAY OF CALCIUM: CPT | Performed by: INTERNAL MEDICINE

## 2022-01-01 PROCEDURE — 83735 ASSAY OF MAGNESIUM: CPT | Performed by: INTERNAL MEDICINE

## 2022-01-01 PROCEDURE — 272N000192 HC ACCESSORY CR2

## 2022-01-01 PROCEDURE — 71046 X-RAY EXAM CHEST 2 VIEWS: CPT

## 2022-01-01 PROCEDURE — 250N000009 HC RX 250: Performed by: INTERNAL MEDICINE

## 2022-01-01 PROCEDURE — 84145 PROCALCITONIN (PCT): CPT | Performed by: EMERGENCY MEDICINE

## 2022-01-01 PROCEDURE — 97110 THERAPEUTIC EXERCISES: CPT | Mod: GP

## 2022-01-01 PROCEDURE — P9604 ONE-WAY ALLOW PRORATED TRIP: HCPCS | Performed by: NURSE PRACTITIONER

## 2022-01-01 PROCEDURE — 84443 ASSAY THYROID STIM HORMONE: CPT | Performed by: EMERGENCY MEDICINE

## 2022-01-01 PROCEDURE — G0179 MD RECERTIFICATION HHA PT: HCPCS | Performed by: INTERNAL MEDICINE

## 2022-01-01 PROCEDURE — 80048 BASIC METABOLIC PNL TOTAL CA: CPT | Performed by: NURSE PRACTITIONER

## 2022-01-01 PROCEDURE — G0180 MD CERTIFICATION HHA PATIENT: HCPCS | Performed by: INTERNAL MEDICINE

## 2022-01-01 PROCEDURE — 99207 PR CDG-CUT & PASTE-POTENTIAL IMPACT ON LEVEL: CPT | Performed by: INTERNAL MEDICINE

## 2022-01-01 PROCEDURE — C9803 HOPD COVID-19 SPEC COLLECT: HCPCS

## 2022-01-01 PROCEDURE — 43246 EGD PLACE GASTROSTOMY TUBE: CPT | Performed by: SURGERY

## 2022-01-01 PROCEDURE — 82784 ASSAY IGA/IGD/IGG/IGM EACH: CPT | Performed by: INTERNAL MEDICINE

## 2022-01-01 PROCEDURE — 83735 ASSAY OF MAGNESIUM: CPT | Performed by: HOSPITALIST

## 2022-01-01 PROCEDURE — 84155 ASSAY OF PROTEIN SERUM: CPT | Mod: 91 | Performed by: INTERNAL MEDICINE

## 2022-01-01 PROCEDURE — 99310 SBSQ NF CARE HIGH MDM 45: CPT | Performed by: NURSE PRACTITIONER

## 2022-01-01 PROCEDURE — 258N000003 HC RX IP 258 OP 636: Performed by: INTERNAL MEDICINE

## 2022-01-01 PROCEDURE — 97530 THERAPEUTIC ACTIVITIES: CPT | Mod: GP

## 2022-01-01 PROCEDURE — 88368 INSITU HYBRIDIZATION MANUAL: CPT | Mod: 26 | Performed by: MEDICAL GENETICS

## 2022-01-01 PROCEDURE — 82248 BILIRUBIN DIRECT: CPT | Performed by: INTERNAL MEDICINE

## 2022-01-01 PROCEDURE — 85025 COMPLETE CBC W/AUTO DIFF WBC: CPT | Performed by: INTERNAL MEDICINE

## 2022-01-01 PROCEDURE — 84145 PROCALCITONIN (PCT): CPT | Performed by: INTERNAL MEDICINE

## 2022-01-01 PROCEDURE — 87637 SARSCOV2&INF A&B&RSV AMP PRB: CPT | Performed by: INTERNAL MEDICINE

## 2022-01-01 PROCEDURE — 99239 HOSP IP/OBS DSCHRG MGMT >30: CPT | Performed by: INTERNAL MEDICINE

## 2022-01-01 PROCEDURE — 36415 COLL VENOUS BLD VENIPUNCTURE: CPT | Performed by: EMERGENCY MEDICINE

## 2022-01-01 PROCEDURE — 99232 SBSQ HOSP IP/OBS MODERATE 35: CPT | Performed by: HOSPITALIST

## 2022-01-01 PROCEDURE — 85048 AUTOMATED LEUKOCYTE COUNT: CPT | Performed by: HOSPITALIST

## 2022-01-01 PROCEDURE — 84295 ASSAY OF SERUM SODIUM: CPT | Performed by: INTERNAL MEDICINE

## 2022-01-01 PROCEDURE — 96365 THER/PROPH/DIAG IV INF INIT: CPT

## 2022-01-01 PROCEDURE — 272N000001 HC OR GENERAL SUPPLY STERILE: Performed by: SURGERY

## 2022-01-01 PROCEDURE — 92610 EVALUATE SWALLOWING FUNCTION: CPT | Mod: GN | Performed by: SPEECH-LANGUAGE PATHOLOGIST

## 2022-01-01 PROCEDURE — 84484 ASSAY OF TROPONIN QUANT: CPT | Performed by: EMERGENCY MEDICINE

## 2022-01-01 PROCEDURE — 36415 COLL VENOUS BLD VENIPUNCTURE: CPT | Performed by: FAMILY MEDICINE

## 2022-01-01 PROCEDURE — 99207 PR CDG-CODE CATEGORY CHANGED: CPT | Performed by: INTERNAL MEDICINE

## 2022-01-01 PROCEDURE — 85025 COMPLETE CBC W/AUTO DIFF WBC: CPT | Performed by: EMERGENCY MEDICINE

## 2022-01-01 PROCEDURE — 81001 URINALYSIS AUTO W/SCOPE: CPT | Performed by: EMERGENCY MEDICINE

## 2022-01-01 PROCEDURE — 36415 COLL VENOUS BLD VENIPUNCTURE: CPT | Performed by: NURSE PRACTITIONER

## 2022-01-01 PROCEDURE — 83605 ASSAY OF LACTIC ACID: CPT | Performed by: EMERGENCY MEDICINE

## 2022-01-01 PROCEDURE — 97116 GAIT TRAINING THERAPY: CPT | Mod: GP | Performed by: PHYSICAL THERAPIST

## 2022-01-01 PROCEDURE — 85045 AUTOMATED RETICULOCYTE COUNT: CPT | Performed by: INTERNAL MEDICINE

## 2022-01-01 PROCEDURE — 80162 ASSAY OF DIGOXIN TOTAL: CPT | Performed by: EMERGENCY MEDICINE

## 2022-01-01 PROCEDURE — 99203 OFFICE O/P NEW LOW 30 MIN: CPT | Performed by: PODIATRIST

## 2022-01-01 PROCEDURE — 88291 CYTO/MOLECULAR REPORT: CPT | Performed by: MEDICAL GENETICS

## 2022-01-01 PROCEDURE — 88184 FLOWCYTOMETRY/ TC 1 MARKER: CPT | Performed by: PATHOLOGY

## 2022-01-01 PROCEDURE — 87899 AGENT NOS ASSAY W/OPTIC: CPT | Performed by: HOSPITALIST

## 2022-01-01 PROCEDURE — A9585 GADOBUTROL INJECTION: HCPCS | Performed by: INTERNAL MEDICINE

## 2022-01-01 PROCEDURE — 370N000017 HC ANESTHESIA TECHNICAL FEE, PER MIN: Performed by: SURGERY

## 2022-01-01 PROCEDURE — 83605 ASSAY OF LACTIC ACID: CPT | Performed by: INTERNAL MEDICINE

## 2022-01-01 PROCEDURE — 360N000076 HC SURGERY LEVEL 3, PER MIN: Performed by: SURGERY

## 2022-01-01 PROCEDURE — 88313 SPECIAL STAINS GROUP 2: CPT | Mod: 26 | Performed by: PATHOLOGY

## 2022-01-01 PROCEDURE — 84132 ASSAY OF SERUM POTASSIUM: CPT | Performed by: HOSPITALIST

## 2022-01-01 PROCEDURE — 80048 BASIC METABOLIC PNL TOTAL CA: CPT | Performed by: INTERNAL MEDICINE

## 2022-01-01 PROCEDURE — 85048 AUTOMATED LEUKOCYTE COUNT: CPT | Performed by: INTERNAL MEDICINE

## 2022-01-01 PROCEDURE — 99239 HOSP IP/OBS DSCHRG MGMT >30: CPT | Performed by: HOSPITALIST

## 2022-01-01 PROCEDURE — 99232 SBSQ HOSP IP/OBS MODERATE 35: CPT | Performed by: STUDENT IN AN ORGANIZED HEALTH CARE EDUCATION/TRAINING PROGRAM

## 2022-01-01 PROCEDURE — 80053 COMPREHEN METABOLIC PANEL: CPT | Performed by: INTERNAL MEDICINE

## 2022-01-01 PROCEDURE — 120N000004 HC R&B MS OVERFLOW

## 2022-01-01 PROCEDURE — 99213 OFFICE O/P EST LOW 20 MIN: CPT | Performed by: FAMILY MEDICINE

## 2022-01-01 PROCEDURE — 82607 VITAMIN B-12: CPT | Performed by: INTERNAL MEDICINE

## 2022-01-01 PROCEDURE — 38221 DX BONE MARROW BIOPSIES: CPT | Performed by: PATHOLOGY

## 2022-01-01 PROCEDURE — G0378 HOSPITAL OBSERVATION PER HR: HCPCS

## 2022-01-01 PROCEDURE — 999N000010 HC STATISTIC ANES STAT CODE-CRNA PER MINUTE: Performed by: PATHOLOGY

## 2022-01-01 PROCEDURE — 84145 PROCALCITONIN (PCT): CPT | Performed by: HOSPITALIST

## 2022-01-01 PROCEDURE — 99233 SBSQ HOSP IP/OBS HIGH 50: CPT | Performed by: HOSPITALIST

## 2022-01-01 PROCEDURE — 87205 SMEAR GRAM STAIN: CPT | Performed by: INTERNAL MEDICINE

## 2022-01-01 PROCEDURE — 250N000011 HC RX IP 250 OP 636: Performed by: SURGERY

## 2022-01-01 PROCEDURE — 99309 SBSQ NF CARE MODERATE MDM 30: CPT | Performed by: NURSE PRACTITIONER

## 2022-01-01 PROCEDURE — 99285 EMERGENCY DEPT VISIT HI MDM: CPT | Mod: 25

## 2022-01-01 PROCEDURE — G0463 HOSPITAL OUTPT CLINIC VISIT: HCPCS

## 2022-01-01 PROCEDURE — 84446 ASSAY OF VITAMIN E: CPT | Performed by: STUDENT IN AN ORGANIZED HEALTH CARE EDUCATION/TRAINING PROGRAM

## 2022-01-01 PROCEDURE — 97530 THERAPEUTIC ACTIVITIES: CPT | Mod: GP | Performed by: PHYSICAL THERAPY ASSISTANT

## 2022-01-01 PROCEDURE — 83605 ASSAY OF LACTIC ACID: CPT | Performed by: HOSPITALIST

## 2022-01-01 PROCEDURE — 255N000002 HC RX 255 OP 636: Performed by: INTERNAL MEDICINE

## 2022-01-01 PROCEDURE — 85027 COMPLETE CBC AUTOMATED: CPT | Performed by: HOSPITALIST

## 2022-01-01 PROCEDURE — 85060 BLOOD SMEAR INTERPRETATION: CPT | Performed by: PATHOLOGY

## 2022-01-01 PROCEDURE — 258N000003 HC RX IP 258 OP 636: Performed by: NURSE ANESTHETIST, CERTIFIED REGISTERED

## 2022-01-01 PROCEDURE — 84155 ASSAY OF PROTEIN SERUM: CPT | Performed by: INTERNAL MEDICINE

## 2022-01-01 PROCEDURE — 92526 ORAL FUNCTION THERAPY: CPT | Mod: GN

## 2022-01-01 PROCEDURE — 86481 TB AG RESPONSE T-CELL SUSP: CPT | Performed by: NURSE PRACTITIONER

## 2022-01-01 PROCEDURE — 82310 ASSAY OF CALCIUM: CPT | Performed by: HOSPITALIST

## 2022-01-01 PROCEDURE — 250N000013 HC RX MED GY IP 250 OP 250 PS 637: Performed by: STUDENT IN AN ORGANIZED HEALTH CARE EDUCATION/TRAINING PROGRAM

## 2022-01-01 PROCEDURE — 36415 COLL VENOUS BLD VENIPUNCTURE: CPT | Performed by: PATHOLOGY

## 2022-01-01 PROCEDURE — 210N000002 HC R&B HEART CARE

## 2022-01-01 PROCEDURE — 97116 GAIT TRAINING THERAPY: CPT | Mod: GP

## 2022-01-01 PROCEDURE — 999N000099 HC STATISTIC MODERATE SEDATION < 10 MIN

## 2022-01-01 PROCEDURE — 83521 IG LIGHT CHAINS FREE EACH: CPT | Performed by: INTERNAL MEDICINE

## 2022-01-01 PROCEDURE — 83519 RIA NONANTIBODY: CPT | Performed by: PSYCHIATRY & NEUROLOGY

## 2022-01-01 PROCEDURE — 86334 IMMUNOFIX E-PHORESIS SERUM: CPT | Mod: 26 | Performed by: PATHOLOGY

## 2022-01-01 PROCEDURE — 85045 AUTOMATED RETICULOCYTE COUNT: CPT | Performed by: PATHOLOGY

## 2022-01-01 PROCEDURE — 99207 PR CDG-MDM COMPONENT: MEETS LOW - DOWN CODED: CPT | Performed by: HOSPITALIST

## 2022-01-01 PROCEDURE — 87506 IADNA-DNA/RNA PROBE TQ 6-11: CPT | Performed by: INTERNAL MEDICINE

## 2022-01-01 PROCEDURE — 86334 IMMUNOFIX E-PHORESIS SERUM: CPT | Performed by: PATHOLOGY

## 2022-01-01 PROCEDURE — 93325 DOPPLER ECHO COLOR FLOW MAPG: CPT | Mod: 26 | Performed by: INTERNAL MEDICINE

## 2022-01-01 PROCEDURE — 250N000011 HC RX IP 250 OP 636: Performed by: NURSE PRACTITIONER

## 2022-01-01 PROCEDURE — 99223 1ST HOSP IP/OBS HIGH 75: CPT | Mod: AI | Performed by: HOSPITALIST

## 2022-01-01 PROCEDURE — 250N000011 HC RX IP 250 OP 636: Performed by: NURSE ANESTHETIST, CERTIFIED REGISTERED

## 2022-01-01 PROCEDURE — 87040 BLOOD CULTURE FOR BACTERIA: CPT | Performed by: INTERNAL MEDICINE

## 2022-01-01 PROCEDURE — 250N000013 HC RX MED GY IP 250 OP 250 PS 637: Performed by: EMERGENCY MEDICINE

## 2022-01-01 PROCEDURE — 71045 X-RAY EXAM CHEST 1 VIEW: CPT

## 2022-01-01 PROCEDURE — 87636 SARSCOV2 & INF A&B AMP PRB: CPT | Performed by: EMERGENCY MEDICINE

## 2022-01-01 PROCEDURE — 97162 PT EVAL MOD COMPLEX 30 MIN: CPT | Mod: GP

## 2022-01-01 PROCEDURE — 86140 C-REACTIVE PROTEIN: CPT | Performed by: INTERNAL MEDICINE

## 2022-01-01 PROCEDURE — 49450 REPLACE G/C TUBE PERC: CPT

## 2022-01-01 PROCEDURE — G1004 CDSM NDSC: HCPCS

## 2022-01-01 PROCEDURE — 87493 C DIFF AMPLIFIED PROBE: CPT | Performed by: INTERNAL MEDICINE

## 2022-01-01 PROCEDURE — 250N000011 HC RX IP 250 OP 636: Performed by: EMERGENCY MEDICINE

## 2022-01-01 PROCEDURE — 99207 BLOOD MORPHOLOGY PATHOLOGIST REVIEW: CPT | Performed by: PATHOLOGY

## 2022-01-01 PROCEDURE — 93321 DOPPLER ECHO F-UP/LMTD STD: CPT | Mod: 26 | Performed by: INTERNAL MEDICINE

## 2022-01-01 PROCEDURE — 84145 PROCALCITONIN (PCT): CPT | Performed by: NURSE PRACTITIONER

## 2022-01-01 PROCEDURE — 36415 COLL VENOUS BLD VENIPUNCTURE: CPT | Performed by: STUDENT IN AN ORGANIZED HEALTH CARE EDUCATION/TRAINING PROGRAM

## 2022-01-01 PROCEDURE — 93306 TTE W/DOPPLER COMPLETE: CPT | Mod: 26 | Performed by: INTERNAL MEDICINE

## 2022-01-01 PROCEDURE — 80162 ASSAY OF DIGOXIN TOTAL: CPT | Performed by: NURSE PRACTITIONER

## 2022-01-01 PROCEDURE — G1010 CDSM STANSON: HCPCS

## 2022-01-01 PROCEDURE — 92526 ORAL FUNCTION THERAPY: CPT | Mod: GN | Performed by: SPEECH-LANGUAGE PATHOLOGIST

## 2022-01-01 PROCEDURE — 84165 PROTEIN E-PHORESIS SERUM: CPT | Mod: 26 | Performed by: PATHOLOGY

## 2022-01-01 PROCEDURE — 96361 HYDRATE IV INFUSION ADD-ON: CPT

## 2022-01-01 PROCEDURE — 93005 ELECTROCARDIOGRAM TRACING: CPT

## 2022-01-01 PROCEDURE — 84165 PROTEIN E-PHORESIS SERUM: CPT | Mod: TC | Performed by: PATHOLOGY

## 2022-01-01 PROCEDURE — 82310 ASSAY OF CALCIUM: CPT | Performed by: EMERGENCY MEDICINE

## 2022-01-01 PROCEDURE — 87637 SARSCOV2&INF A&B&RSV AMP PRB: CPT | Performed by: EMERGENCY MEDICINE

## 2022-01-01 PROCEDURE — 49440 PLACE GASTROSTOMY TUBE PERC: CPT

## 2022-01-01 PROCEDURE — 81001 URINALYSIS AUTO W/SCOPE: CPT | Performed by: INTERNAL MEDICINE

## 2022-01-01 PROCEDURE — 85014 HEMATOCRIT: CPT | Performed by: INTERNAL MEDICINE

## 2022-01-01 PROCEDURE — C8924 2D TTE W OR W/O FOL W/CON,FU: HCPCS

## 2022-01-01 PROCEDURE — 71250 CT THORAX DX C-: CPT | Mod: MG

## 2022-01-01 PROCEDURE — 99214 OFFICE O/P EST MOD 30 MIN: CPT | Performed by: INTERNAL MEDICINE

## 2022-01-01 PROCEDURE — 85041 AUTOMATED RBC COUNT: CPT | Performed by: HOSPITALIST

## 2022-01-01 PROCEDURE — 85014 HEMATOCRIT: CPT | Performed by: STUDENT IN AN ORGANIZED HEALTH CARE EDUCATION/TRAINING PROGRAM

## 2022-01-01 PROCEDURE — 82746 ASSAY OF FOLIC ACID SERUM: CPT | Performed by: PSYCHIATRY & NEUROLOGY

## 2022-01-01 PROCEDURE — 96374 THER/PROPH/DIAG INJ IV PUSH: CPT

## 2022-01-01 PROCEDURE — 70450 CT HEAD/BRAIN W/O DYE: CPT | Mod: MC

## 2022-01-01 PROCEDURE — 99443 PR PHYSICIAN TELEPHONE EVALUATION 21-30 MIN: CPT | Mod: 95 | Performed by: INTERNAL MEDICINE

## 2022-01-01 PROCEDURE — 88264 CHROMOSOME ANALYSIS 20-25: CPT | Performed by: PATHOLOGY

## 2022-01-01 PROCEDURE — 86140 C-REACTIVE PROTEIN: CPT | Performed by: HOSPITALIST

## 2022-01-01 PROCEDURE — 250N000025 HC SEVOFLURANE, PER MIN: Performed by: SURGERY

## 2022-01-01 PROCEDURE — 74230 X-RAY XM SWLNG FUNCJ C+: CPT

## 2022-01-01 PROCEDURE — 92610 EVALUATE SWALLOWING FUNCTION: CPT | Mod: GN

## 2022-01-01 PROCEDURE — 83605 ASSAY OF LACTIC ACID: CPT | Performed by: STUDENT IN AN ORGANIZED HEALTH CARE EDUCATION/TRAINING PROGRAM

## 2022-01-01 PROCEDURE — 80053 COMPREHEN METABOLIC PANEL: CPT | Performed by: EMERGENCY MEDICINE

## 2022-01-01 PROCEDURE — 710N000010 HC RECOVERY PHASE 1, LEVEL 2, PER MIN: Performed by: SURGERY

## 2022-01-01 PROCEDURE — 82607 VITAMIN B-12: CPT | Performed by: PSYCHIATRY & NEUROLOGY

## 2022-01-01 PROCEDURE — 83880 ASSAY OF NATRIURETIC PEPTIDE: CPT | Performed by: EMERGENCY MEDICINE

## 2022-01-01 PROCEDURE — 99214 OFFICE O/P EST MOD 30 MIN: CPT | Performed by: FAMILY MEDICINE

## 2022-01-01 PROCEDURE — 88369 M/PHMTRC ALYSISHQUANT/SEMIQ: CPT | Mod: 26 | Performed by: MEDICAL GENETICS

## 2022-01-01 PROCEDURE — 80162 ASSAY OF DIGOXIN TOTAL: CPT | Performed by: HOSPITALIST

## 2022-01-01 PROCEDURE — 80162 ASSAY OF DIGOXIN TOTAL: CPT | Performed by: INTERNAL MEDICINE

## 2022-01-01 PROCEDURE — 82533 TOTAL CORTISOL: CPT | Performed by: INTERNAL MEDICINE

## 2022-01-01 PROCEDURE — 99223 1ST HOSP IP/OBS HIGH 75: CPT | Mod: AI | Performed by: INTERNAL MEDICINE

## 2022-01-01 PROCEDURE — 87493 C DIFF AMPLIFIED PROBE: CPT | Performed by: NURSE PRACTITIONER

## 2022-01-01 PROCEDURE — C1769 GUIDE WIRE: HCPCS

## 2022-01-01 PROCEDURE — 70553 MRI BRAIN STEM W/O & W/DYE: CPT | Mod: ME

## 2022-01-01 PROCEDURE — 85025 COMPLETE CBC W/AUTO DIFF WBC: CPT | Performed by: HOSPITALIST

## 2022-01-01 PROCEDURE — 99223 1ST HOSP IP/OBS HIGH 75: CPT | Performed by: INTERNAL MEDICINE

## 2022-01-01 PROCEDURE — 82310 ASSAY OF CALCIUM: CPT | Performed by: STUDENT IN AN ORGANIZED HEALTH CARE EDUCATION/TRAINING PROGRAM

## 2022-01-01 PROCEDURE — 99607 MTMS BY PHARM ADDL 15 MIN: CPT | Performed by: PHARMACIST

## 2022-01-01 PROCEDURE — 77075 RADEX OSSEOUS SURVEY COMPL: CPT

## 2022-01-01 PROCEDURE — 96367 TX/PROPH/DG ADDL SEQ IV INF: CPT

## 2022-01-01 PROCEDURE — 83550 IRON BINDING TEST: CPT | Performed by: INTERNAL MEDICINE

## 2022-01-01 PROCEDURE — 81003 URINALYSIS AUTO W/O SCOPE: CPT | Performed by: FAMILY MEDICINE

## 2022-01-01 PROCEDURE — 87899 AGENT NOS ASSAY W/OPTIC: CPT | Performed by: INTERNAL MEDICINE

## 2022-01-01 PROCEDURE — 84484 ASSAY OF TROPONIN QUANT: CPT | Performed by: INTERNAL MEDICINE

## 2022-01-01 PROCEDURE — 83605 ASSAY OF LACTIC ACID: CPT | Performed by: NURSE PRACTITIONER

## 2022-01-01 PROCEDURE — 80048 BASIC METABOLIC PNL TOTAL CA: CPT | Performed by: STUDENT IN AN ORGANIZED HEALTH CARE EDUCATION/TRAINING PROGRAM

## 2022-01-01 PROCEDURE — 82525 ASSAY OF COPPER: CPT | Performed by: PSYCHIATRY & NEUROLOGY

## 2022-01-01 PROCEDURE — 0DH64UZ INSERTION OF FEEDING DEVICE INTO STOMACH, PERCUTANEOUS ENDOSCOPIC APPROACH: ICD-10-PCS | Performed by: SURGERY

## 2022-01-01 PROCEDURE — U0005 INFEC AGEN DETEC AMPLI PROBE: HCPCS | Performed by: INTERNAL MEDICINE

## 2022-01-01 PROCEDURE — 85027 COMPLETE CBC AUTOMATED: CPT | Performed by: NURSE PRACTITIONER

## 2022-01-01 PROCEDURE — 272N000187 HC ACCESSORY CR11

## 2022-01-01 PROCEDURE — 99605 MTMS BY PHARM NP 15 MIN: CPT | Performed by: PHARMACIST

## 2022-01-01 PROCEDURE — 88161 CYTOPATH SMEAR OTHER SOURCE: CPT | Mod: 26 | Performed by: PATHOLOGY

## 2022-01-01 PROCEDURE — 82746 ASSAY OF FOLIC ACID SERUM: CPT | Performed by: INTERNAL MEDICINE

## 2022-01-01 PROCEDURE — 99207 REFERRAL TO ACUTE AND DIAGNOSTIC SERVICES: CPT | Performed by: INTERNAL MEDICINE

## 2022-01-01 PROCEDURE — 97110 THERAPEUTIC EXERCISES: CPT | Mod: GP | Performed by: PHYSICAL THERAPY ASSISTANT

## 2022-01-01 PROCEDURE — 99231 SBSQ HOSP IP/OBS SF/LOW 25: CPT | Performed by: INTERNAL MEDICINE

## 2022-01-01 PROCEDURE — 83880 ASSAY OF NATRIURETIC PEPTIDE: CPT | Performed by: INTERNAL MEDICINE

## 2022-01-01 PROCEDURE — 92611 MOTION FLUOROSCOPY/SWALLOW: CPT | Mod: GN

## 2022-01-01 PROCEDURE — 80048 BASIC METABOLIC PNL TOTAL CA: CPT | Performed by: HOSPITALIST

## 2022-01-01 PROCEDURE — 99207 PR APP CREDIT; MD BILLING SHARED VISIT: CPT | Performed by: NURSE PRACTITIONER

## 2022-01-01 PROCEDURE — U0003 INFECTIOUS AGENT DETECTION BY NUCLEIC ACID (DNA OR RNA); SEVERE ACUTE RESPIRATORY SYNDROME CORONAVIRUS 2 (SARS-COV-2) (CORONAVIRUS DISEASE [COVID-19]), AMPLIFIED PROBE TECHNIQUE, MAKING USE OF HIGH THROUGHPUT TECHNOLOGIES AS DESCRIBED BY CMS-2020-01-R: HCPCS | Performed by: EMERGENCY MEDICINE

## 2022-01-01 PROCEDURE — 93325 DOPPLER ECHO COLOR FLOW MAPG: CPT

## 2022-01-01 PROCEDURE — 88305 TISSUE EXAM BY PATHOLOGIST: CPT | Mod: TC | Performed by: PATHOLOGY

## 2022-01-01 PROCEDURE — 84100 ASSAY OF PHOSPHORUS: CPT | Performed by: PHYSICIAN ASSISTANT

## 2022-01-01 PROCEDURE — 83735 ASSAY OF MAGNESIUM: CPT | Performed by: PHYSICIAN ASSISTANT

## 2022-01-01 PROCEDURE — 99215 OFFICE O/P EST HI 40 MIN: CPT | Performed by: INTERNAL MEDICINE

## 2022-01-01 PROCEDURE — 88271 CYTOGENETICS DNA PROBE: CPT | Performed by: PATHOLOGY

## 2022-01-01 PROCEDURE — 85097 BONE MARROW INTERPRETATION: CPT | Performed by: PATHOLOGY

## 2022-01-01 PROCEDURE — 82728 ASSAY OF FERRITIN: CPT | Performed by: INTERNAL MEDICINE

## 2022-01-01 PROCEDURE — 96360 HYDRATION IV INFUSION INIT: CPT

## 2022-01-01 PROCEDURE — 83690 ASSAY OF LIPASE: CPT | Performed by: EMERGENCY MEDICINE

## 2022-01-01 PROCEDURE — 99231 SBSQ HOSP IP/OBS SF/LOW 25: CPT | Performed by: HOSPITALIST

## 2022-01-01 PROCEDURE — G0463 HOSPITAL OUTPT CLINIC VISIT: HCPCS | Mod: 25

## 2022-01-01 PROCEDURE — 999N000141 HC STATISTIC PRE-PROCEDURE NURSING ASSESSMENT: Performed by: SURGERY

## 2022-01-01 PROCEDURE — 370N000017 HC ANESTHESIA TECHNICAL FEE, PER MIN: Performed by: PATHOLOGY

## 2022-01-01 PROCEDURE — 258N000003 HC RX IP 258 OP 636: Performed by: ANESTHESIOLOGY

## 2022-01-01 PROCEDURE — 99205 OFFICE O/P NEW HI 60 MIN: CPT | Performed by: PHYSICAL MEDICINE & REHABILITATION

## 2022-01-01 PROCEDURE — 99316 NF DSCHRG MGMT 30 MIN+: CPT | Performed by: NURSE PRACTITIONER

## 2022-01-01 PROCEDURE — 82803 BLOOD GASES ANY COMBINATION: CPT

## 2022-01-01 PROCEDURE — 87040 BLOOD CULTURE FOR BACTERIA: CPT | Performed by: NURSE PRACTITIONER

## 2022-01-01 PROCEDURE — 38222 DX BONE MARROW BX & ASPIR: CPT | Performed by: PATHOLOGY

## 2022-01-01 PROCEDURE — 36415 COLL VENOUS BLD VENIPUNCTURE: CPT | Performed by: PSYCHIATRY & NEUROLOGY

## 2022-01-01 PROCEDURE — 87077 CULTURE AEROBIC IDENTIFY: CPT | Performed by: INTERNAL MEDICINE

## 2022-01-01 PROCEDURE — 82565 ASSAY OF CREATININE: CPT | Performed by: HOSPITALIST

## 2022-01-01 PROCEDURE — P9041 ALBUMIN (HUMAN),5%, 50ML: HCPCS | Performed by: NURSE PRACTITIONER

## 2022-01-01 PROCEDURE — 92611 MOTION FLUOROSCOPY/SWALLOW: CPT | Mod: GN | Performed by: SPEECH-LANGUAGE PATHOLOGIST

## 2022-01-01 PROCEDURE — 99207 PR CDG-MDM COMPONENT: MEETS LOW - DOWN CODED: CPT | Performed by: STUDENT IN AN ORGANIZED HEALTH CARE EDUCATION/TRAINING PROGRAM

## 2022-01-01 PROCEDURE — 83605 ASSAY OF LACTIC ACID: CPT

## 2022-01-01 PROCEDURE — 258N000003 HC RX IP 258 OP 636: Performed by: STUDENT IN AN ORGANIZED HEALTH CARE EDUCATION/TRAINING PROGRAM

## 2022-01-01 PROCEDURE — 84100 ASSAY OF PHOSPHORUS: CPT | Performed by: HOSPITALIST

## 2022-01-01 PROCEDURE — 82040 ASSAY OF SERUM ALBUMIN: CPT | Performed by: EMERGENCY MEDICINE

## 2022-01-01 PROCEDURE — 88188 FLOWCYTOMETRY/READ 9-15: CPT | Performed by: PATHOLOGY

## 2022-01-01 PROCEDURE — 83735 ASSAY OF MAGNESIUM: CPT | Performed by: NURSE PRACTITIONER

## 2022-01-01 PROCEDURE — 92610 EVALUATE SWALLOWING FUNCTION: CPT | Mod: GN,59 | Performed by: SPEECH-LANGUAGE PATHOLOGIST

## 2022-01-01 PROCEDURE — 84132 ASSAY OF SERUM POTASSIUM: CPT | Performed by: STUDENT IN AN ORGANIZED HEALTH CARE EDUCATION/TRAINING PROGRAM

## 2022-01-01 PROCEDURE — 999N000154 HC STATISTIC RADIOLOGY XRAY, US, CT, MAR, NM

## 2022-01-01 PROCEDURE — 85025 COMPLETE CBC W/AUTO DIFF WBC: CPT | Performed by: FAMILY MEDICINE

## 2022-01-01 PROCEDURE — 99306 1ST NF CARE HIGH MDM 50: CPT | Performed by: INTERNAL MEDICINE

## 2022-01-01 PROCEDURE — 99222 1ST HOSP IP/OBS MODERATE 55: CPT | Mod: 57 | Performed by: SURGERY

## 2022-01-01 PROCEDURE — 84630 ASSAY OF ZINC: CPT | Performed by: PSYCHIATRY & NEUROLOGY

## 2022-01-01 PROCEDURE — 36415 COLL VENOUS BLD VENIPUNCTURE: CPT

## 2022-01-01 PROCEDURE — 99222 1ST HOSP IP/OBS MODERATE 55: CPT | Performed by: INTERNAL MEDICINE

## 2022-01-01 PROCEDURE — 82306 VITAMIN D 25 HYDROXY: CPT | Performed by: NURSE PRACTITIONER

## 2022-01-01 PROCEDURE — 99221 1ST HOSP IP/OBS SF/LOW 40: CPT | Performed by: INTERNAL MEDICINE

## 2022-01-01 PROCEDURE — 80048 BASIC METABOLIC PNL TOTAL CA: CPT | Performed by: FAMILY MEDICINE

## 2022-01-01 PROCEDURE — 84165 PROTEIN E-PHORESIS SERUM: CPT | Mod: 26

## 2022-01-01 PROCEDURE — 96375 TX/PRO/DX INJ NEW DRUG ADDON: CPT

## 2022-01-01 PROCEDURE — 82374 ASSAY BLOOD CARBON DIOXIDE: CPT | Performed by: INTERNAL MEDICINE

## 2022-01-01 PROCEDURE — 74220 X-RAY XM ESOPHAGUS 1CNTRST: CPT

## 2022-01-01 PROCEDURE — 83735 ASSAY OF MAGNESIUM: CPT | Performed by: EMERGENCY MEDICINE

## 2022-01-01 PROCEDURE — 85027 COMPLETE CBC AUTOMATED: CPT | Performed by: STUDENT IN AN ORGANIZED HEALTH CARE EDUCATION/TRAINING PROGRAM

## 2022-01-01 PROCEDURE — BD12ZZZ FLUOROSCOPY OF STOMACH: ICD-10-PCS | Performed by: RADIOLOGY

## 2022-01-01 PROCEDURE — 85014 HEMATOCRIT: CPT | Performed by: NURSE PRACTITIONER

## 2022-01-01 PROCEDURE — 99207 PR NO BILLABLE SERVICE THIS VISIT: CPT | Performed by: STUDENT IN AN ORGANIZED HEALTH CARE EDUCATION/TRAINING PROGRAM

## 2022-01-01 PROCEDURE — 88237 TISSUE CULTURE BONE MARROW: CPT | Performed by: PATHOLOGY

## 2022-01-01 PROCEDURE — 99231 SBSQ HOSP IP/OBS SF/LOW 25: CPT | Performed by: STUDENT IN AN ORGANIZED HEALTH CARE EDUCATION/TRAINING PROGRAM

## 2022-01-01 PROCEDURE — 84132 ASSAY OF SERUM POTASSIUM: CPT | Performed by: PHYSICIAN ASSISTANT

## 2022-01-01 PROCEDURE — 85004 AUTOMATED DIFF WBC COUNT: CPT | Performed by: HOSPITALIST

## 2022-01-01 PROCEDURE — 87635 SARS-COV-2 COVID-19 AMP PRB: CPT | Performed by: HOSPITALIST

## 2022-01-01 PROCEDURE — 72156 MRI NECK SPINE W/O & W/DYE: CPT | Mod: MG

## 2022-01-01 PROCEDURE — 93308 TTE F-UP OR LMTD: CPT | Mod: 26 | Performed by: INTERNAL MEDICINE

## 2022-01-01 PROCEDURE — 88305 TISSUE EXAM BY PATHOLOGIST: CPT | Mod: 26 | Performed by: PATHOLOGY

## 2022-01-01 PROCEDURE — 84100 ASSAY OF PHOSPHORUS: CPT | Performed by: EMERGENCY MEDICINE

## 2022-01-01 PROCEDURE — 82272 OCCULT BLD FECES 1-3 TESTS: CPT | Performed by: EMERGENCY MEDICINE

## 2022-01-01 PROCEDURE — 258N000003 HC RX IP 258 OP 636: Performed by: NURSE PRACTITIONER

## 2022-01-01 PROCEDURE — 250N000009 HC RX 250: Performed by: SURGERY

## 2022-01-01 PROCEDURE — 93306 TTE W/DOPPLER COMPLETE: CPT

## 2022-01-01 RX ORDER — POTASSIUM CHLORIDE 1.5 G/1.58G
20 POWDER, FOR SOLUTION ORAL ONCE
Status: COMPLETED | OUTPATIENT
Start: 2022-01-01 | End: 2022-01-01

## 2022-01-01 RX ORDER — NALOXONE HYDROCHLORIDE 0.4 MG/ML
0.2 INJECTION, SOLUTION INTRAMUSCULAR; INTRAVENOUS; SUBCUTANEOUS
Status: DISCONTINUED | OUTPATIENT
Start: 2022-01-01 | End: 2022-01-01 | Stop reason: HOSPADM

## 2022-01-01 RX ORDER — GABAPENTIN 250 MG/5ML
600 SOLUTION ORAL ONCE
Status: COMPLETED | OUTPATIENT
Start: 2022-01-01 | End: 2022-01-01

## 2022-01-01 RX ORDER — CEFUROXIME AXETIL 500 MG/1
500 TABLET ORAL EVERY 12 HOURS SCHEDULED
Status: DISPENSED | OUTPATIENT
Start: 2022-01-01 | End: 2022-01-01

## 2022-01-01 RX ORDER — ACETAMINOPHEN 500 MG
1000 TABLET ORAL 3 TIMES DAILY
Status: DISCONTINUED | OUTPATIENT
Start: 2022-01-01 | End: 2022-01-01 | Stop reason: ALTCHOICE

## 2022-01-01 RX ORDER — OXYCODONE HYDROCHLORIDE 5 MG/1
5 TABLET ORAL EVERY 4 HOURS PRN
Status: DISCONTINUED | OUTPATIENT
Start: 2022-01-01 | End: 2022-01-01 | Stop reason: HOSPADM

## 2022-01-01 RX ORDER — OXYCODONE HCL 5 MG/5 ML
5 SOLUTION, ORAL ORAL EVERY 4 HOURS PRN
Qty: 45 ML | Refills: 0 | Status: SHIPPED | OUTPATIENT
Start: 2022-01-01 | End: 2022-01-01

## 2022-01-01 RX ORDER — IPRATROPIUM BROMIDE AND ALBUTEROL SULFATE 2.5; .5 MG/3ML; MG/3ML
1 SOLUTION RESPIRATORY (INHALATION) EVERY 4 HOURS PRN
COMMUNITY
End: 2022-01-01

## 2022-01-01 RX ORDER — ACETAMINOPHEN 325 MG/10.15ML
975 LIQUID ORAL 3 TIMES DAILY
Status: DISCONTINUED | OUTPATIENT
Start: 2022-01-01 | End: 2022-01-01 | Stop reason: HOSPADM

## 2022-01-01 RX ORDER — MECLIZINE HYDROCHLORIDE 25 MG/1
25 TABLET ORAL 2 TIMES DAILY
Start: 2022-01-01 | End: 2022-01-01

## 2022-01-01 RX ORDER — CLOPIDOGREL BISULFATE 75 MG/1
75 TABLET ORAL DAILY
Status: DISCONTINUED | OUTPATIENT
Start: 2022-01-01 | End: 2022-01-01 | Stop reason: HOSPADM

## 2022-01-01 RX ORDER — CEFTRIAXONE 1 G/1
1 INJECTION, POWDER, FOR SOLUTION INTRAMUSCULAR; INTRAVENOUS EVERY 24 HOURS
Status: DISCONTINUED | OUTPATIENT
Start: 2022-01-01 | End: 2022-01-01

## 2022-01-01 RX ORDER — LIDOCAINE 4 G/G
1 PATCH TOPICAL EVERY 24 HOURS
Status: ON HOLD | COMMUNITY
End: 2022-01-01

## 2022-01-01 RX ORDER — MIRTAZAPINE 15 MG/1
15 TABLET, FILM COATED ORAL ONCE
Status: COMPLETED | OUTPATIENT
Start: 2022-01-01 | End: 2022-01-01

## 2022-01-01 RX ORDER — DIGOXIN 125 MCG
125 TABLET ORAL DAILY
Status: DISCONTINUED | OUTPATIENT
Start: 2022-01-01 | End: 2022-01-01

## 2022-01-01 RX ORDER — GADOBUTROL 604.72 MG/ML
6 INJECTION INTRAVENOUS ONCE
Status: COMPLETED | OUTPATIENT
Start: 2022-01-01 | End: 2022-01-01

## 2022-01-01 RX ORDER — FUROSEMIDE 20 MG
10 TABLET ORAL DAILY
Start: 2022-01-01 | End: 2022-01-01

## 2022-01-01 RX ORDER — GADOBUTROL 604.72 MG/ML
6 INJECTION INTRAVENOUS ONCE
Status: DISCONTINUED | OUTPATIENT
Start: 2022-01-01 | End: 2022-01-01 | Stop reason: HOSPADM

## 2022-01-01 RX ORDER — BUDESONIDE 3 MG/1
3 CAPSULE, COATED PELLETS ORAL 3 TIMES DAILY
Qty: 270 CAPSULE | Refills: 3 | Status: ON HOLD | OUTPATIENT
Start: 2022-01-01 | End: 2022-01-01

## 2022-01-01 RX ORDER — AMOXICILLIN 250 MG
2 CAPSULE ORAL 2 TIMES DAILY PRN
COMMUNITY

## 2022-01-01 RX ORDER — GUAR GUM
1 PACKET (EA) ORAL DAILY
Status: DISCONTINUED | OUTPATIENT
Start: 2022-01-01 | End: 2022-01-01 | Stop reason: HOSPADM

## 2022-01-01 RX ORDER — POTASSIUM CHLORIDE 1.5 G/1.58G
40 POWDER, FOR SOLUTION ORAL ONCE
Status: COMPLETED | OUTPATIENT
Start: 2022-01-01 | End: 2022-01-01

## 2022-01-01 RX ORDER — GABAPENTIN 250 MG/5ML
300 SOLUTION ORAL 2 TIMES DAILY
Status: DISCONTINUED | OUTPATIENT
Start: 2022-01-01 | End: 2022-01-01 | Stop reason: HOSPADM

## 2022-01-01 RX ORDER — POTASSIUM CHLORIDE 1.5 G/1.58G
20 POWDER, FOR SOLUTION ORAL DAILY
Qty: 90 EACH | Refills: 3 | Status: SHIPPED | OUTPATIENT
Start: 2022-01-01

## 2022-01-01 RX ORDER — ONDANSETRON 4 MG/1
4 TABLET, ORALLY DISINTEGRATING ORAL EVERY 6 HOURS PRN
Status: DISCONTINUED | OUTPATIENT
Start: 2022-01-01 | End: 2022-01-01 | Stop reason: HOSPADM

## 2022-01-01 RX ORDER — ONDANSETRON 2 MG/ML
4 INJECTION INTRAMUSCULAR; INTRAVENOUS EVERY 6 HOURS PRN
Status: DISCONTINUED | OUTPATIENT
Start: 2022-01-01 | End: 2022-01-01 | Stop reason: HOSPADM

## 2022-01-01 RX ORDER — NALOXONE HYDROCHLORIDE 0.4 MG/ML
0.4 INJECTION, SOLUTION INTRAMUSCULAR; INTRAVENOUS; SUBCUTANEOUS
Status: DISCONTINUED | OUTPATIENT
Start: 2022-01-01 | End: 2022-01-01 | Stop reason: HOSPADM

## 2022-01-01 RX ORDER — AMOXICILLIN 250 MG
2 CAPSULE ORAL 2 TIMES DAILY PRN
Status: DISCONTINUED | OUTPATIENT
Start: 2022-01-01 | End: 2022-01-01 | Stop reason: HOSPADM

## 2022-01-01 RX ORDER — MULTIVITAMIN,THERAPEUTIC
1 TABLET ORAL DAILY
Status: DISCONTINUED | OUTPATIENT
Start: 2022-01-01 | End: 2022-01-01 | Stop reason: CLARIF

## 2022-01-01 RX ORDER — CEFTRIAXONE 2 G/1
2 INJECTION, POWDER, FOR SOLUTION INTRAMUSCULAR; INTRAVENOUS ONCE
Status: DISCONTINUED | OUTPATIENT
Start: 2022-01-01 | End: 2022-01-01

## 2022-01-01 RX ORDER — FUROSEMIDE 10 MG/ML
20 INJECTION INTRAMUSCULAR; INTRAVENOUS ONCE
Status: COMPLETED | OUTPATIENT
Start: 2022-01-01 | End: 2022-01-01

## 2022-01-01 RX ORDER — METRONIDAZOLE 500 MG/100ML
500 INJECTION, SOLUTION INTRAVENOUS EVERY 12 HOURS
Status: DISCONTINUED | OUTPATIENT
Start: 2022-01-01 | End: 2022-01-01

## 2022-01-01 RX ORDER — PROCHLORPERAZINE 25 MG
12.5 SUPPOSITORY, RECTAL RECTAL EVERY 12 HOURS PRN
Status: DISCONTINUED | OUTPATIENT
Start: 2022-01-01 | End: 2022-01-01

## 2022-01-01 RX ORDER — ONDANSETRON 2 MG/ML
INJECTION INTRAMUSCULAR; INTRAVENOUS PRN
Status: DISCONTINUED | OUTPATIENT
Start: 2022-01-01 | End: 2022-01-01

## 2022-01-01 RX ORDER — MIRTAZAPINE 15 MG/1
15 TABLET, FILM COATED ORAL AT BEDTIME
Status: DISCONTINUED | OUTPATIENT
Start: 2022-01-01 | End: 2022-01-01 | Stop reason: HOSPADM

## 2022-01-01 RX ORDER — CEFTRIAXONE 2 G/1
2 INJECTION, POWDER, FOR SOLUTION INTRAMUSCULAR; INTRAVENOUS EVERY 24 HOURS
Status: DISCONTINUED | OUTPATIENT
Start: 2022-01-01 | End: 2022-01-01

## 2022-01-01 RX ORDER — LACTOBACILLUS RHAMNOSUS GG 10B CELL
1 CAPSULE ORAL 3 TIMES DAILY
COMMUNITY
Start: 2022-01-01 | End: 2022-01-01

## 2022-01-01 RX ORDER — POTASSIUM CHLORIDE 750 MG/1
20 TABLET, EXTENDED RELEASE ORAL ONCE
Status: COMPLETED | OUTPATIENT
Start: 2022-01-01 | End: 2022-01-01

## 2022-01-01 RX ORDER — GABAPENTIN 300 MG/1
300 CAPSULE ORAL 2 TIMES DAILY
Status: DISCONTINUED | OUTPATIENT
Start: 2022-01-01 | End: 2022-01-01

## 2022-01-01 RX ORDER — LIDOCAINE HYDROCHLORIDE 10 MG/ML
8-10 INJECTION, SOLUTION EPIDURAL; INFILTRATION; INTRACAUDAL; PERINEURAL
Status: DISCONTINUED | OUTPATIENT
Start: 2022-01-01 | End: 2022-01-01 | Stop reason: HOSPADM

## 2022-01-01 RX ORDER — MIRTAZAPINE 15 MG/1
15 TABLET, FILM COATED ORAL AT BEDTIME
DISCHARGE
Start: 2022-01-01

## 2022-01-01 RX ORDER — IPRATROPIUM BROMIDE AND ALBUTEROL SULFATE 2.5; .5 MG/3ML; MG/3ML
1 SOLUTION RESPIRATORY (INHALATION) EVERY 4 HOURS PRN
Status: DISCONTINUED | OUTPATIENT
Start: 2022-01-01 | End: 2022-01-01 | Stop reason: HOSPADM

## 2022-01-01 RX ORDER — IPRATROPIUM BROMIDE AND ALBUTEROL SULFATE 2.5; .5 MG/3ML; MG/3ML
3 SOLUTION RESPIRATORY (INHALATION) ONCE
Status: DISCONTINUED | OUTPATIENT
Start: 2022-01-01 | End: 2022-01-01 | Stop reason: HOSPADM

## 2022-01-01 RX ORDER — CEFUROXIME AXETIL 500 MG/1
500 TABLET ORAL EVERY 12 HOURS SCHEDULED
Status: DISCONTINUED | OUTPATIENT
Start: 2022-01-01 | End: 2022-01-01

## 2022-01-01 RX ORDER — ONDANSETRON 4 MG/1
4 TABLET, ORALLY DISINTEGRATING ORAL EVERY 6 HOURS PRN
Status: DISCONTINUED | OUTPATIENT
Start: 2022-01-01 | End: 2022-01-01 | Stop reason: ALTCHOICE

## 2022-01-01 RX ORDER — POTASSIUM CHLORIDE 20MEQ/15ML
20 LIQUID (ML) ORAL ONCE
Status: COMPLETED | OUTPATIENT
Start: 2022-01-01 | End: 2022-01-01

## 2022-01-01 RX ORDER — GABAPENTIN 600 MG/1
600 TABLET ORAL AT BEDTIME
Status: DISCONTINUED | OUTPATIENT
Start: 2022-01-01 | End: 2022-01-01

## 2022-01-01 RX ORDER — CEFDINIR 300 MG/1
300 CAPSULE ORAL EVERY 12 HOURS
Qty: 7 CAPSULE | Refills: 0 | Status: SHIPPED | OUTPATIENT
Start: 2022-01-01 | End: 2022-01-01

## 2022-01-01 RX ORDER — SODIUM CHLORIDE AND POTASSIUM CHLORIDE 150; 900 MG/100ML; MG/100ML
INJECTION, SOLUTION INTRAVENOUS CONTINUOUS
Status: DISCONTINUED | OUTPATIENT
Start: 2022-01-01 | End: 2022-01-01

## 2022-01-01 RX ORDER — GABAPENTIN 300 MG/1
600 CAPSULE ORAL AT BEDTIME
Qty: 180 CAPSULE | Refills: 3 | Status: SHIPPED | OUTPATIENT
Start: 2022-01-01 | End: 2022-01-01

## 2022-01-01 RX ORDER — METOPROLOL TARTRATE 25 MG/1
6.25 TABLET, FILM COATED ORAL 2 TIMES DAILY
Start: 2022-01-01 | End: 2022-01-01

## 2022-01-01 RX ORDER — SODIUM CHLORIDE, SODIUM LACTATE, POTASSIUM CHLORIDE, CALCIUM CHLORIDE 600; 310; 30; 20 MG/100ML; MG/100ML; MG/100ML; MG/100ML
INJECTION, SOLUTION INTRAVENOUS CONTINUOUS PRN
Status: DISCONTINUED | OUTPATIENT
Start: 2022-01-01 | End: 2022-01-01

## 2022-01-01 RX ORDER — ACETAMINOPHEN 500 MG
1000 TABLET ORAL 3 TIMES DAILY PRN
Status: DISCONTINUED | OUTPATIENT
Start: 2022-01-01 | End: 2022-01-01 | Stop reason: HOSPADM

## 2022-01-01 RX ORDER — GABAPENTIN 300 MG/1
300 CAPSULE ORAL EVERY MORNING
Status: DISCONTINUED | OUTPATIENT
Start: 2022-01-01 | End: 2022-01-01 | Stop reason: ALTCHOICE

## 2022-01-01 RX ORDER — DIGOXIN 125 MCG
125 TABLET ORAL DAILY
Status: DISCONTINUED | OUTPATIENT
Start: 2022-01-01 | End: 2022-01-01 | Stop reason: HOSPADM

## 2022-01-01 RX ORDER — METOPROLOL TARTRATE 1 MG/ML
2.5 INJECTION, SOLUTION INTRAVENOUS EVERY 4 HOURS PRN
Status: DISCONTINUED | OUTPATIENT
Start: 2022-01-01 | End: 2022-01-01 | Stop reason: HOSPADM

## 2022-01-01 RX ORDER — LABETALOL HYDROCHLORIDE 5 MG/ML
10 INJECTION, SOLUTION INTRAVENOUS
Status: DISCONTINUED | OUTPATIENT
Start: 2022-01-01 | End: 2022-01-01 | Stop reason: HOSPADM

## 2022-01-01 RX ORDER — METRONIDAZOLE 500 MG/1
500 TABLET ORAL 3 TIMES DAILY
Status: COMPLETED | OUTPATIENT
Start: 2022-01-01 | End: 2022-01-01

## 2022-01-01 RX ORDER — POTASSIUM CHLORIDE 750 MG/1
20 TABLET, EXTENDED RELEASE ORAL DAILY
COMMUNITY
End: 2022-01-01

## 2022-01-01 RX ORDER — ACETAMINOPHEN 500 MG
1000 TABLET ORAL 3 TIMES DAILY PRN
COMMUNITY

## 2022-01-01 RX ORDER — ATORVASTATIN CALCIUM 40 MG/1
40 TABLET, FILM COATED ORAL DAILY
Status: DISCONTINUED | OUTPATIENT
Start: 2022-01-01 | End: 2022-01-01 | Stop reason: HOSPADM

## 2022-01-01 RX ORDER — BARIUM SULFATE 400 MG/ML
SUSPENSION ORAL ONCE
Status: COMPLETED | OUTPATIENT
Start: 2022-01-01 | End: 2022-01-01

## 2022-01-01 RX ORDER — ROPINIROLE 0.25 MG/1
0.25 TABLET, FILM COATED ORAL AT BEDTIME
Start: 2022-01-01 | End: 2022-01-01

## 2022-01-01 RX ORDER — LIDOCAINE HYDROCHLORIDE 20 MG/ML
INJECTION, SOLUTION INFILTRATION; PERINEURAL PRN
Status: DISCONTINUED | OUTPATIENT
Start: 2022-01-01 | End: 2022-01-01

## 2022-01-01 RX ORDER — POLYETHYLENE GLYCOL 3350 17 G/17G
17 POWDER, FOR SOLUTION ORAL DAILY PRN
Status: DISCONTINUED | OUTPATIENT
Start: 2022-01-01 | End: 2022-01-01 | Stop reason: HOSPADM

## 2022-01-01 RX ORDER — CEFUROXIME AXETIL 500 MG/1
500 TABLET ORAL 2 TIMES DAILY
Qty: 20 TABLET | Refills: 0 | Status: SHIPPED | OUTPATIENT
Start: 2022-01-01 | End: 2022-01-01 | Stop reason: ALTCHOICE

## 2022-01-01 RX ORDER — POTASSIUM CHLORIDE 7.45 MG/ML
10 INJECTION INTRAVENOUS
Status: COMPLETED | OUTPATIENT
Start: 2022-01-01 | End: 2022-01-01

## 2022-01-01 RX ORDER — HYDROMORPHONE HCL IN WATER/PF 6 MG/30 ML
0.2 PATIENT CONTROLLED ANALGESIA SYRINGE INTRAVENOUS
Status: DISCONTINUED | OUTPATIENT
Start: 2022-01-01 | End: 2022-01-01 | Stop reason: HOSPADM

## 2022-01-01 RX ORDER — PROCHLORPERAZINE 25 MG
12.5 SUPPOSITORY, RECTAL RECTAL EVERY 12 HOURS PRN
Status: DISCONTINUED | OUTPATIENT
Start: 2022-01-01 | End: 2022-01-01 | Stop reason: HOSPADM

## 2022-01-01 RX ORDER — ACETAMINOPHEN 325 MG/1
325-650 TABLET ORAL EVERY 6 HOURS PRN
Status: DISCONTINUED | OUTPATIENT
Start: 2022-01-01 | End: 2022-01-01

## 2022-01-01 RX ORDER — CEFTRIAXONE 2 G/1
2 INJECTION, POWDER, FOR SOLUTION INTRAMUSCULAR; INTRAVENOUS EVERY 24 HOURS
Status: DISCONTINUED | OUTPATIENT
Start: 2022-01-01 | End: 2022-01-01 | Stop reason: HOSPADM

## 2022-01-01 RX ORDER — POTASSIUM CHLORIDE 20MEQ/15ML
40 LIQUID (ML) ORAL ONCE
Status: COMPLETED | OUTPATIENT
Start: 2022-01-01 | End: 2022-01-01

## 2022-01-01 RX ORDER — GABAPENTIN 300 MG/1
CAPSULE ORAL
Qty: 180 CAPSULE | Refills: 3 | Status: CANCELLED | OUTPATIENT
Start: 2022-01-01

## 2022-01-01 RX ORDER — BUDESONIDE 3 MG/1
3 CAPSULE, COATED PELLETS ORAL 2 TIMES DAILY
Status: DISCONTINUED | OUTPATIENT
Start: 2022-01-01 | End: 2022-01-01 | Stop reason: HOSPADM

## 2022-01-01 RX ORDER — LOPERAMIDE HCL 2 MG
2 CAPSULE ORAL 4 TIMES DAILY PRN
Status: DISCONTINUED | OUTPATIENT
Start: 2022-01-01 | End: 2022-01-01 | Stop reason: HOSPADM

## 2022-01-01 RX ORDER — SENNOSIDES 8.6 MG
650 CAPSULE ORAL 2 TIMES DAILY
COMMUNITY
End: 2022-01-01

## 2022-01-01 RX ORDER — FENTANYL CITRATE 50 UG/ML
25-50 INJECTION, SOLUTION INTRAMUSCULAR; INTRAVENOUS EVERY 5 MIN PRN
Status: DISCONTINUED | OUTPATIENT
Start: 2022-01-01 | End: 2022-01-01 | Stop reason: HOSPADM

## 2022-01-01 RX ORDER — ONDANSETRON 4 MG/1
4 TABLET, ORALLY DISINTEGRATING ORAL EVERY 30 MIN PRN
Status: DISCONTINUED | OUTPATIENT
Start: 2022-01-01 | End: 2022-01-01 | Stop reason: HOSPADM

## 2022-01-01 RX ORDER — BUPIVACAINE HYDROCHLORIDE AND EPINEPHRINE 2.5; 5 MG/ML; UG/ML
INJECTION, SOLUTION INFILTRATION; PERINEURAL PRN
Status: DISCONTINUED | OUTPATIENT
Start: 2022-01-01 | End: 2022-01-01 | Stop reason: HOSPADM

## 2022-01-01 RX ORDER — CEFDINIR 300 MG/1
300 CAPSULE ORAL EVERY 12 HOURS
Qty: 14 CAPSULE | Refills: 0 | Status: SHIPPED | OUTPATIENT
Start: 2022-01-01 | End: 2022-01-01

## 2022-01-01 RX ORDER — MIRTAZAPINE 15 MG/1
15 TABLET, FILM COATED ORAL AT BEDTIME
Status: DISCONTINUED | OUTPATIENT
Start: 2022-01-01 | End: 2022-01-01

## 2022-01-01 RX ORDER — ESMOLOL HYDROCHLORIDE 10 MG/ML
INJECTION INTRAVENOUS PRN
Status: DISCONTINUED | OUTPATIENT
Start: 2022-01-01 | End: 2022-01-01

## 2022-01-01 RX ORDER — PROPOFOL 10 MG/ML
INJECTION, EMULSION INTRAVENOUS PRN
Status: DISCONTINUED | OUTPATIENT
Start: 2022-01-01 | End: 2022-01-01

## 2022-01-01 RX ORDER — AMOXICILLIN 250 MG
1 CAPSULE ORAL 2 TIMES DAILY PRN
Status: DISCONTINUED | OUTPATIENT
Start: 2022-01-01 | End: 2022-01-01 | Stop reason: HOSPADM

## 2022-01-01 RX ORDER — MULTIVITAMIN,THERAPEUTIC
1 TABLET ORAL DAILY
COMMUNITY

## 2022-01-01 RX ORDER — LIDOCAINE 40 MG/G
CREAM TOPICAL
Status: DISCONTINUED | OUTPATIENT
Start: 2022-01-01 | End: 2022-01-01 | Stop reason: HOSPADM

## 2022-01-01 RX ORDER — DIGOXIN 125 MCG
125 TABLET ORAL DAILY
DISCHARGE
Start: 2022-01-01

## 2022-01-01 RX ORDER — IPRATROPIUM BROMIDE AND ALBUTEROL SULFATE 2.5; .5 MG/3ML; MG/3ML
3 SOLUTION RESPIRATORY (INHALATION) EVERY 4 HOURS PRN
Status: DISCONTINUED | OUTPATIENT
Start: 2022-01-01 | End: 2022-01-01 | Stop reason: HOSPADM

## 2022-01-01 RX ORDER — VIT C/E/ZN/COPPR/LUTEIN/ZEAXAN 60 MG-6 MG
1 CAPSULE ORAL DAILY
Status: DISCONTINUED | OUTPATIENT
Start: 2022-01-01 | End: 2022-01-01 | Stop reason: HOSPADM

## 2022-01-01 RX ORDER — METOPROLOL SUCCINATE 25 MG/1
25 TABLET, EXTENDED RELEASE ORAL DAILY
Qty: 90 TABLET | Refills: 3 | COMMUNITY
Start: 2022-01-01 | End: 2022-01-01 | Stop reason: DRUGHIGH

## 2022-01-01 RX ORDER — DEXAMETHASONE SODIUM PHOSPHATE 4 MG/ML
INJECTION, SOLUTION INTRA-ARTICULAR; INTRALESIONAL; INTRAMUSCULAR; INTRAVENOUS; SOFT TISSUE PRN
Status: DISCONTINUED | OUTPATIENT
Start: 2022-01-01 | End: 2022-01-01

## 2022-01-01 RX ORDER — HYDRALAZINE HYDROCHLORIDE 20 MG/ML
2.5-5 INJECTION INTRAMUSCULAR; INTRAVENOUS EVERY 10 MIN PRN
Status: DISCONTINUED | OUTPATIENT
Start: 2022-01-01 | End: 2022-01-01 | Stop reason: HOSPADM

## 2022-01-01 RX ORDER — CEFTRIAXONE 2 G/1
2 INJECTION, POWDER, FOR SOLUTION INTRAMUSCULAR; INTRAVENOUS ONCE
Status: COMPLETED | OUTPATIENT
Start: 2022-01-01 | End: 2022-01-01

## 2022-01-01 RX ORDER — CLINDAMYCIN PALMITATE HYDROCHLORIDE 75 MG/5ML
450 SOLUTION ORAL 3 TIMES DAILY
Status: DISCONTINUED | OUTPATIENT
Start: 2022-01-01 | End: 2022-01-01 | Stop reason: HOSPADM

## 2022-01-01 RX ORDER — GABAPENTIN 250 MG/5ML
300 SOLUTION ORAL EVERY MORNING
Status: DISCONTINUED | OUTPATIENT
Start: 2022-01-01 | End: 2022-01-01 | Stop reason: HOSPADM

## 2022-01-01 RX ORDER — OXYCODONE HYDROCHLORIDE 5 MG/1
2.5 TABLET ORAL EVERY 6 HOURS PRN
Qty: 20 TABLET | Refills: 0 | Status: SHIPPED | OUTPATIENT
Start: 2022-01-01 | End: 2022-01-01

## 2022-01-01 RX ORDER — GABAPENTIN 300 MG/1
300 CAPSULE ORAL AT BEDTIME
Status: DISCONTINUED | OUTPATIENT
Start: 2022-01-01 | End: 2022-01-01 | Stop reason: HOSPADM

## 2022-01-01 RX ORDER — SODIUM CHLORIDE 9 MG/ML
INJECTION, SOLUTION INTRAVENOUS CONTINUOUS
Status: ACTIVE | OUTPATIENT
Start: 2022-01-01 | End: 2022-01-01

## 2022-01-01 RX ORDER — BUDESONIDE 3 MG/1
6 CAPSULE, COATED PELLETS ORAL DAILY
Status: DISCONTINUED | OUTPATIENT
Start: 2022-01-01 | End: 2022-01-01

## 2022-01-01 RX ORDER — GABAPENTIN 250 MG/5ML
600 SOLUTION ORAL AT BEDTIME
Status: DISCONTINUED | OUTPATIENT
Start: 2022-01-01 | End: 2022-01-01 | Stop reason: HOSPADM

## 2022-01-01 RX ORDER — OXYCODONE HYDROCHLORIDE 5 MG/1
2.5 TABLET ORAL EVERY 6 HOURS PRN
COMMUNITY
End: 2022-01-01

## 2022-01-01 RX ORDER — IPRATROPIUM BROMIDE AND ALBUTEROL SULFATE 2.5; .5 MG/3ML; MG/3ML
3 SOLUTION RESPIRATORY (INHALATION) EVERY 4 HOURS PRN
Qty: 90 ML | DISCHARGE
Start: 2022-01-01 | End: 2022-01-01

## 2022-01-01 RX ORDER — ATORVASTATIN CALCIUM 40 MG/1
40 TABLET, FILM COATED ORAL DAILY
Qty: 90 TABLET | Refills: 1 | Status: ON HOLD | OUTPATIENT
Start: 2022-01-01 | End: 2022-01-01

## 2022-01-01 RX ORDER — CALCIUM CARBONATE 1250 MG/5ML
1500 SUSPENSION ORAL 2 TIMES DAILY WITH MEALS
Status: DISCONTINUED | OUTPATIENT
Start: 2022-01-01 | End: 2022-01-01 | Stop reason: HOSPADM

## 2022-01-01 RX ORDER — CEFEPIME HYDROCHLORIDE 1 G/1
1 INJECTION, POWDER, FOR SOLUTION INTRAMUSCULAR; INTRAVENOUS EVERY 12 HOURS
Status: DISCONTINUED | OUTPATIENT
Start: 2022-01-01 | End: 2022-01-01

## 2022-01-01 RX ORDER — GUAIFENESIN 200 MG/10ML
200 LIQUID ORAL EVERY 4 HOURS PRN
COMMUNITY
End: 2022-01-01

## 2022-01-01 RX ORDER — CLINDAMYCIN PALMITATE HYDROCHLORIDE 75 MG/5ML
450 SOLUTION ORAL 3 TIMES DAILY
Qty: 630 ML | Refills: 0 | DISCHARGE
Start: 2022-01-01 | End: 2022-01-01

## 2022-01-01 RX ORDER — CEFDINIR 125 MG/5ML
300 POWDER, FOR SUSPENSION ORAL EVERY 12 HOURS SCHEDULED
Status: DISCONTINUED | OUTPATIENT
Start: 2022-01-01 | End: 2022-01-01 | Stop reason: HOSPADM

## 2022-01-01 RX ORDER — SODIUM CHLORIDE 9 MG/ML
INJECTION, SOLUTION INTRAVENOUS CONTINUOUS
Status: DISCONTINUED | OUTPATIENT
Start: 2022-01-01 | End: 2022-01-01

## 2022-01-01 RX ORDER — CEFDINIR 125 MG/5ML
300 POWDER, FOR SUSPENSION ORAL EVERY 12 HOURS SCHEDULED
Status: COMPLETED | OUTPATIENT
Start: 2022-01-01 | End: 2022-01-01

## 2022-01-01 RX ORDER — OXYCODONE HYDROCHLORIDE 5 MG/1
5-10 TABLET ORAL EVERY 4 HOURS PRN
Status: DISCONTINUED | OUTPATIENT
Start: 2022-01-01 | End: 2022-01-01

## 2022-01-01 RX ORDER — MECLIZINE HYDROCHLORIDE 25 MG/1
12.5 TABLET ORAL 2 TIMES DAILY
Start: 2022-01-01 | End: 2022-01-01

## 2022-01-01 RX ORDER — METOPROLOL SUCCINATE 25 MG/1
25 TABLET, EXTENDED RELEASE ORAL DAILY
Status: DISCONTINUED | OUTPATIENT
Start: 2022-01-01 | End: 2022-01-01 | Stop reason: HOSPADM

## 2022-01-01 RX ORDER — ONDANSETRON 4 MG/1
4 TABLET, FILM COATED ORAL EVERY 6 HOURS PRN
Status: DISCONTINUED | OUTPATIENT
Start: 2022-01-01 | End: 2022-01-01 | Stop reason: HOSPADM

## 2022-01-01 RX ORDER — LOPERAMIDE HCL 1 MG/7.5ML
4 SUSPENSION ORAL 4 TIMES DAILY PRN
Status: DISCONTINUED | OUTPATIENT
Start: 2022-01-01 | End: 2022-01-01 | Stop reason: HOSPADM

## 2022-01-01 RX ORDER — CLINDAMYCIN HCL 300 MG
300 CAPSULE ORAL 3 TIMES DAILY
Qty: 30 CAPSULE | Refills: 0 | Status: SHIPPED | OUTPATIENT
Start: 2022-01-01 | End: 2022-01-01

## 2022-01-01 RX ORDER — ATORVASTATIN CALCIUM 40 MG/1
40 TABLET, FILM COATED ORAL AT BEDTIME
DISCHARGE
Start: 2022-01-01

## 2022-01-01 RX ORDER — PROCHLORPERAZINE MALEATE 5 MG
5 TABLET ORAL EVERY 6 HOURS PRN
Status: DISCONTINUED | OUTPATIENT
Start: 2022-01-01 | End: 2022-01-01 | Stop reason: HOSPADM

## 2022-01-01 RX ORDER — EPHEDRINE SULFATE 50 MG/ML
INJECTION, SOLUTION INTRAMUSCULAR; INTRAVENOUS; SUBCUTANEOUS PRN
Status: DISCONTINUED | OUTPATIENT
Start: 2022-01-01 | End: 2022-01-01

## 2022-01-01 RX ORDER — ATORVASTATIN CALCIUM 20 MG/1
40 TABLET, FILM COATED ORAL EVERY EVENING
Status: DISCONTINUED | OUTPATIENT
Start: 2022-01-01 | End: 2022-01-01

## 2022-01-01 RX ORDER — DOXYCYCLINE 100 MG/1
100 CAPSULE ORAL EVERY 12 HOURS SCHEDULED
Status: DISPENSED | OUTPATIENT
Start: 2022-01-01 | End: 2022-01-01

## 2022-01-01 RX ORDER — ONDANSETRON 2 MG/ML
4 INJECTION INTRAMUSCULAR; INTRAVENOUS EVERY 30 MIN PRN
Status: DISCONTINUED | OUTPATIENT
Start: 2022-01-01 | End: 2022-01-01 | Stop reason: HOSPADM

## 2022-01-01 RX ORDER — ATORVASTATIN CALCIUM 20 MG/1
40 TABLET, FILM COATED ORAL EVERY EVENING
Status: DISCONTINUED | OUTPATIENT
Start: 2022-01-01 | End: 2022-01-01 | Stop reason: HOSPADM

## 2022-01-01 RX ORDER — POTASSIUM CHLORIDE 750 MG/1
40 TABLET, EXTENDED RELEASE ORAL ONCE
Status: COMPLETED | OUTPATIENT
Start: 2022-01-01 | End: 2022-01-01

## 2022-01-01 RX ORDER — CEFEPIME HYDROCHLORIDE 1 G/1
1 INJECTION, POWDER, FOR SOLUTION INTRAMUSCULAR; INTRAVENOUS EVERY 8 HOURS
Status: DISCONTINUED | OUTPATIENT
Start: 2022-01-01 | End: 2022-01-01

## 2022-01-01 RX ORDER — GUAR GUM
1 PACKET (EA) ORAL DAILY
Qty: 75 EACH | Refills: 3 | Status: ON HOLD | OUTPATIENT
Start: 2022-01-01 | End: 2022-01-01

## 2022-01-01 RX ORDER — ACETAMINOPHEN 325 MG/10.15ML
650 LIQUID ORAL EVERY 6 HOURS PRN
Status: DISCONTINUED | OUTPATIENT
Start: 2022-01-01 | End: 2022-01-01 | Stop reason: HOSPADM

## 2022-01-01 RX ORDER — ACETAMINOPHEN 325 MG/1
650 TABLET ORAL EVERY 6 HOURS PRN
Status: DISCONTINUED | OUTPATIENT
Start: 2022-01-01 | End: 2022-01-01

## 2022-01-01 RX ORDER — FENTANYL CITRATE 0.05 MG/ML
50 INJECTION, SOLUTION INTRAMUSCULAR; INTRAVENOUS EVERY 5 MIN PRN
Status: DISCONTINUED | OUTPATIENT
Start: 2022-01-01 | End: 2022-01-01 | Stop reason: HOSPADM

## 2022-01-01 RX ORDER — BUDESONIDE 3 MG/1
3 CAPSULE, COATED PELLETS ORAL 2 TIMES DAILY
DISCHARGE
Start: 2022-01-01

## 2022-01-01 RX ORDER — BUDESONIDE 3 MG/1
3 CAPSULE, COATED PELLETS ORAL 2 TIMES DAILY
Qty: 180 CAPSULE | Refills: 3 | Status: CANCELLED | OUTPATIENT
Start: 2022-01-01

## 2022-01-01 RX ORDER — METOPROLOL TARTRATE 25 MG/1
12.5 TABLET, FILM COATED ORAL 2 TIMES DAILY
Start: 2022-01-01 | End: 2022-01-01

## 2022-01-01 RX ORDER — GABAPENTIN 300 MG/1
600 CAPSULE ORAL AT BEDTIME
Status: DISCONTINUED | OUTPATIENT
Start: 2022-01-01 | End: 2022-01-01

## 2022-01-01 RX ORDER — GABAPENTIN 250 MG/5ML
600 SOLUTION ORAL AT BEDTIME
Status: DISCONTINUED | OUTPATIENT
Start: 2022-01-01 | End: 2022-01-01

## 2022-01-01 RX ORDER — GUAIFENESIN 600 MG/1
15 TABLET, EXTENDED RELEASE ORAL DAILY
Status: DISCONTINUED | OUTPATIENT
Start: 2022-01-01 | End: 2022-01-01 | Stop reason: HOSPADM

## 2022-01-01 RX ORDER — DOXYCYCLINE HYCLATE 100 MG
100 TABLET ORAL 2 TIMES DAILY
Qty: 14 TABLET | Refills: 0 | Status: SHIPPED | OUTPATIENT
Start: 2022-01-01 | End: 2022-01-01

## 2022-01-01 RX ORDER — POTASSIUM CHLORIDE 1.5 G/1.58G
20 POWDER, FOR SOLUTION ORAL DAILY
Status: DISCONTINUED | OUTPATIENT
Start: 2022-01-01 | End: 2022-01-01 | Stop reason: HOSPADM

## 2022-01-01 RX ORDER — CLINDAMYCIN PHOSPHATE 900 MG/50ML
900 INJECTION, SOLUTION INTRAVENOUS SEE ADMIN INSTRUCTIONS
Status: DISCONTINUED | OUTPATIENT
Start: 2022-01-01 | End: 2022-01-01 | Stop reason: HOSPADM

## 2022-01-01 RX ORDER — METOPROLOL TARTRATE 1 MG/ML
2.5 INJECTION, SOLUTION INTRAVENOUS ONCE
Status: DISCONTINUED | OUTPATIENT
Start: 2022-01-01 | End: 2022-01-01

## 2022-01-01 RX ORDER — BUDESONIDE 3 MG/1
3 CAPSULE, COATED PELLETS ORAL DAILY
Status: DISCONTINUED | OUTPATIENT
Start: 2022-01-01 | End: 2022-01-01 | Stop reason: HOSPADM

## 2022-01-01 RX ORDER — HYDROMORPHONE HCL IN WATER/PF 6 MG/30 ML
.2-.4 PATIENT CONTROLLED ANALGESIA SYRINGE INTRAVENOUS
Status: DISCONTINUED | OUTPATIENT
Start: 2022-01-01 | End: 2022-01-01 | Stop reason: HOSPADM

## 2022-01-01 RX ORDER — GABAPENTIN 300 MG/1
300 CAPSULE ORAL EVERY MORNING
Status: DISCONTINUED | OUTPATIENT
Start: 2022-01-01 | End: 2022-01-01

## 2022-01-01 RX ORDER — MAGNESIUM SULFATE HEPTAHYDRATE 40 MG/ML
4 INJECTION, SOLUTION INTRAVENOUS ONCE
Status: COMPLETED | OUTPATIENT
Start: 2022-01-01 | End: 2022-01-01

## 2022-01-01 RX ORDER — DEXMEDETOMIDINE HYDROCHLORIDE 4 UG/ML
INJECTION, SOLUTION INTRAVENOUS PRN
Status: DISCONTINUED | OUTPATIENT
Start: 2022-01-01 | End: 2022-01-01

## 2022-01-01 RX ORDER — FENTANYL CITRATE 50 UG/ML
INJECTION, SOLUTION INTRAMUSCULAR; INTRAVENOUS PRN
Status: DISCONTINUED | OUTPATIENT
Start: 2022-01-01 | End: 2022-01-01

## 2022-01-01 RX ORDER — SODIUM CHLORIDE, SODIUM LACTATE, POTASSIUM CHLORIDE, CALCIUM CHLORIDE 600; 310; 30; 20 MG/100ML; MG/100ML; MG/100ML; MG/100ML
INJECTION, SOLUTION INTRAVENOUS CONTINUOUS
Status: DISCONTINUED | OUTPATIENT
Start: 2022-01-01 | End: 2022-01-01 | Stop reason: HOSPADM

## 2022-01-01 RX ORDER — DEXTROSE MONOHYDRATE 100 MG/ML
INJECTION, SOLUTION INTRAVENOUS CONTINUOUS PRN
Status: DISCONTINUED | OUTPATIENT
Start: 2022-01-01 | End: 2022-01-01 | Stop reason: HOSPADM

## 2022-01-01 RX ORDER — ONDANSETRON 2 MG/ML
4 INJECTION INTRAMUSCULAR; INTRAVENOUS EVERY 6 HOURS PRN
Status: DISCONTINUED | OUTPATIENT
Start: 2022-01-01 | End: 2022-01-01 | Stop reason: ALTCHOICE

## 2022-01-01 RX ORDER — OXYCODONE HCL 5 MG/5 ML
5 SOLUTION, ORAL ORAL EVERY 4 HOURS PRN
Status: DISCONTINUED | OUTPATIENT
Start: 2022-01-01 | End: 2022-01-01 | Stop reason: HOSPADM

## 2022-01-01 RX ORDER — ACETAMINOPHEN 325 MG/1
975 TABLET ORAL ONCE
Status: DISCONTINUED | OUTPATIENT
Start: 2022-01-01 | End: 2022-01-01 | Stop reason: HOSPADM

## 2022-01-01 RX ORDER — CLINDAMYCIN PHOSPHATE 900 MG/50ML
900 INJECTION, SOLUTION INTRAVENOUS
Status: COMPLETED | OUTPATIENT
Start: 2022-01-01 | End: 2022-01-01

## 2022-01-01 RX ORDER — BUDESONIDE 9 MG/1
9 TABLET, FILM COATED, EXTENDED RELEASE ORAL DAILY
Status: DISCONTINUED | OUTPATIENT
Start: 2022-01-01 | End: 2022-01-01

## 2022-01-01 RX ORDER — CLOPIDOGREL BISULFATE 75 MG/1
75 TABLET ORAL EVERY EVENING
Status: DISCONTINUED | OUTPATIENT
Start: 2022-01-01 | End: 2022-01-01

## 2022-01-01 RX ORDER — FLUMAZENIL 0.1 MG/ML
0.2 INJECTION, SOLUTION INTRAVENOUS
Status: DISCONTINUED | OUTPATIENT
Start: 2022-01-01 | End: 2022-01-01 | Stop reason: HOSPADM

## 2022-01-01 RX ORDER — AZITHROMYCIN 500 MG/1
500 INJECTION, POWDER, LYOPHILIZED, FOR SOLUTION INTRAVENOUS ONCE
Status: COMPLETED | OUTPATIENT
Start: 2022-01-01 | End: 2022-01-01

## 2022-01-01 RX ORDER — ACETAMINOPHEN 650 MG/1
650 SUPPOSITORY RECTAL EVERY 6 HOURS PRN
Status: DISCONTINUED | OUTPATIENT
Start: 2022-01-01 | End: 2022-01-01

## 2022-01-01 RX ORDER — ALBUTEROL SULFATE 0.83 MG/ML
2.5 SOLUTION RESPIRATORY (INHALATION) 3 TIMES DAILY
Status: ON HOLD | COMMUNITY
Start: 2022-01-01 | End: 2022-01-01

## 2022-01-01 RX ORDER — LYSINE HCL 500 MG
1 TABLET ORAL
COMMUNITY
End: 2022-01-01

## 2022-01-01 RX ORDER — ATORVASTATIN CALCIUM 40 MG/1
40 TABLET, FILM COATED ORAL AT BEDTIME
Status: DISCONTINUED | OUTPATIENT
Start: 2022-01-01 | End: 2022-01-01 | Stop reason: HOSPADM

## 2022-01-01 RX ORDER — HYDROMORPHONE HCL IN WATER/PF 6 MG/30 ML
0.2 PATIENT CONTROLLED ANALGESIA SYRINGE INTRAVENOUS EVERY 5 MIN PRN
Status: DISCONTINUED | OUTPATIENT
Start: 2022-01-01 | End: 2022-01-01 | Stop reason: HOSPADM

## 2022-01-01 RX ORDER — GABAPENTIN 250 MG/5ML
300 SOLUTION ORAL EVERY MORNING
Status: DISCONTINUED | OUTPATIENT
Start: 2022-01-01 | End: 2022-01-01

## 2022-01-01 RX ORDER — DIGOXIN 0.05 MG/ML
125 SOLUTION ORAL DAILY
Status: DISCONTINUED | OUTPATIENT
Start: 2022-01-01 | End: 2022-01-01 | Stop reason: HOSPADM

## 2022-01-01 RX ORDER — LOPERAMIDE HYDROCHLORIDE 1 MG/5ML
2 SOLUTION ORAL 4 TIMES DAILY PRN
COMMUNITY
End: 2022-01-01

## 2022-01-01 RX ORDER — GUAIFENESIN 600 MG/1
600 TABLET, EXTENDED RELEASE ORAL 2 TIMES DAILY
COMMUNITY
Start: 2022-01-01 | End: 2022-01-01

## 2022-01-01 RX ORDER — ACETAMINOPHEN 650 MG/1
650 SUPPOSITORY RECTAL EVERY 6 HOURS PRN
Status: DISCONTINUED | OUTPATIENT
Start: 2022-01-01 | End: 2022-01-01 | Stop reason: HOSPADM

## 2022-01-01 RX ORDER — LEVOFLOXACIN 5 MG/ML
750 INJECTION, SOLUTION INTRAVENOUS ONCE
Status: DISCONTINUED | OUTPATIENT
Start: 2022-01-01 | End: 2022-01-01

## 2022-01-01 RX ORDER — MIRTAZAPINE 7.5 MG/1
15 TABLET, FILM COATED ORAL AT BEDTIME
Status: DISCONTINUED | OUTPATIENT
Start: 2022-01-01 | End: 2022-01-01 | Stop reason: HOSPADM

## 2022-01-01 RX ORDER — GABAPENTIN 300 MG/1
CAPSULE ORAL
Qty: 360 CAPSULE | Refills: 3 | Status: SHIPPED | OUTPATIENT
Start: 2022-01-01

## 2022-01-01 RX ORDER — OXYCODONE HYDROCHLORIDE 5 MG/1
2.5 TABLET ORAL EVERY 6 HOURS PRN
Qty: 20 TABLET | Refills: 0 | Status: CANCELLED | OUTPATIENT
Start: 2022-01-01

## 2022-01-01 RX ORDER — DIMENHYDRINATE 50 MG/ML
25 INJECTION, SOLUTION INTRAMUSCULAR; INTRAVENOUS
Status: DISCONTINUED | OUTPATIENT
Start: 2022-01-01 | End: 2022-01-01 | Stop reason: HOSPADM

## 2022-01-01 RX ORDER — AZITHROMYCIN 500 MG/1
500 INJECTION, POWDER, LYOPHILIZED, FOR SOLUTION INTRAVENOUS EVERY 24 HOURS
Status: COMPLETED | OUTPATIENT
Start: 2022-01-01 | End: 2022-01-01

## 2022-01-01 RX ORDER — OXYCODONE HYDROCHLORIDE 5 MG/1
2.5 TABLET ORAL EVERY 6 HOURS PRN
Qty: 12 TABLET | Refills: 0 | Status: CANCELLED | OUTPATIENT
Start: 2022-01-01 | End: 2022-01-01

## 2022-01-01 RX ORDER — LEVOFLOXACIN 500 MG/1
500 TABLET, FILM COATED ORAL DAILY
Qty: 3 TABLET | Refills: 0 | COMMUNITY
Start: 2022-01-01 | End: 2022-01-01

## 2022-01-01 RX ORDER — FUROSEMIDE 20 MG
20 TABLET ORAL DAILY
Status: DISCONTINUED | OUTPATIENT
Start: 2022-01-01 | End: 2022-01-01

## 2022-01-01 RX ORDER — CEFUROXIME AXETIL 250 MG/1
500 TABLET ORAL EVERY 12 HOURS SCHEDULED
Status: COMPLETED | OUTPATIENT
Start: 2022-01-01 | End: 2022-01-01

## 2022-01-01 RX ORDER — ALBUMIN, HUMAN INJ 5% 5 %
25 SOLUTION INTRAVENOUS ONCE
Status: COMPLETED | OUTPATIENT
Start: 2022-01-01 | End: 2022-01-01

## 2022-01-01 RX ORDER — PROCHLORPERAZINE MALEATE 5 MG
5 TABLET ORAL EVERY 6 HOURS PRN
Status: DISCONTINUED | OUTPATIENT
Start: 2022-01-01 | End: 2022-01-01

## 2022-01-01 RX ORDER — GABAPENTIN 300 MG/1
600 CAPSULE ORAL AT BEDTIME
Status: DISCONTINUED | OUTPATIENT
Start: 2022-01-01 | End: 2022-01-01 | Stop reason: ALTCHOICE

## 2022-01-01 RX ORDER — MECLIZINE HYDROCHLORIDE 25 MG/1
25 TABLET ORAL EVERY 6 HOURS PRN
COMMUNITY
End: 2022-01-01

## 2022-01-01 RX ORDER — ACETAMINOPHEN 325 MG/1
650 TABLET ORAL EVERY 6 HOURS PRN
Status: DISCONTINUED | OUTPATIENT
Start: 2022-01-01 | End: 2022-01-01 | Stop reason: HOSPADM

## 2022-01-01 RX ORDER — LACTOBACILLUS RHAMNOSUS GG 10B CELL
1 CAPSULE ORAL 2 TIMES DAILY
Status: DISCONTINUED | OUTPATIENT
Start: 2022-01-01 | End: 2022-01-01 | Stop reason: HOSPADM

## 2022-01-01 RX ORDER — GABAPENTIN 300 MG/1
300 CAPSULE ORAL EVERY MORNING
COMMUNITY
End: 2022-01-01

## 2022-01-01 RX ADMIN — METRONIDAZOLE 500 MG: 500 TABLET ORAL at 09:36

## 2022-01-01 RX ADMIN — MINERAL SUPPLEMENT IRON 300 MG / 5 ML STRENGTH LIQUID 100 PER BOX UNFLAVORED 220 MG: at 08:25

## 2022-01-01 RX ADMIN — CEFUROXIME AXETIL 500 MG: 250 TABLET ORAL at 21:28

## 2022-01-01 RX ADMIN — APIXABAN 5 MG: 5 TABLET, FILM COATED ORAL at 20:31

## 2022-01-01 RX ADMIN — METRONIDAZOLE 500 MG: 500 TABLET ORAL at 16:59

## 2022-01-01 RX ADMIN — Medication 1 TABLET: at 08:13

## 2022-01-01 RX ADMIN — MIRTAZAPINE 15 MG: 15 TABLET, FILM COATED ORAL at 21:43

## 2022-01-01 RX ADMIN — ACETAMINOPHEN 975 MG: 325 SUSPENSION ORAL at 21:18

## 2022-01-01 RX ADMIN — POTASSIUM & SODIUM PHOSPHATES POWDER PACK 280-160-250 MG 1 PACKET: 280-160-250 PACK at 11:34

## 2022-01-01 RX ADMIN — Medication 1 PACKET: at 20:36

## 2022-01-01 RX ADMIN — ACETAMINOPHEN 650 MG: 325 TABLET ORAL at 08:52

## 2022-01-01 RX ADMIN — POTASSIUM CHLORIDE 40 MEQ: 20 SOLUTION ORAL at 18:11

## 2022-01-01 RX ADMIN — MULTIVITAMIN 15 ML: LIQUID ORAL at 07:47

## 2022-01-01 RX ADMIN — ATORVASTATIN CALCIUM 40 MG: 40 TABLET, FILM COATED ORAL at 20:02

## 2022-01-01 RX ADMIN — Medication 1 CAPSULE: at 08:54

## 2022-01-01 RX ADMIN — PHENYLEPHRINE HYDROCHLORIDE 50 MCG: 10 INJECTION INTRAVENOUS at 11:40

## 2022-01-01 RX ADMIN — ACETAMINOPHEN 975 MG: 325 SUSPENSION ORAL at 20:35

## 2022-01-01 RX ADMIN — CEFTRIAXONE SODIUM 2 G: 2 INJECTION, POWDER, FOR SOLUTION INTRAMUSCULAR; INTRAVENOUS at 15:03

## 2022-01-01 RX ADMIN — POTASSIUM CHLORIDE 40 MEQ: 1.5 POWDER, FOR SOLUTION ORAL at 23:00

## 2022-01-01 RX ADMIN — AZITHROMYCIN MONOHYDRATE 500 MG: 500 INJECTION, POWDER, LYOPHILIZED, FOR SOLUTION INTRAVENOUS at 06:44

## 2022-01-01 RX ADMIN — MIRTAZAPINE 15 MG: 15 TABLET, FILM COATED ORAL at 21:56

## 2022-01-01 RX ADMIN — POTASSIUM CHLORIDE 10 MEQ: 7.46 INJECTION, SOLUTION INTRAVENOUS at 20:21

## 2022-01-01 RX ADMIN — ACETAMINOPHEN 1000 MG: 500 TABLET, FILM COATED ORAL at 15:39

## 2022-01-01 RX ADMIN — ACETAMINOPHEN 1000 MG: 500 TABLET, FILM COATED ORAL at 10:00

## 2022-01-01 RX ADMIN — Medication 1 TABLET: at 08:12

## 2022-01-01 RX ADMIN — CLOPIDOGREL BISULFATE 75 MG: 75 TABLET ORAL at 08:42

## 2022-01-01 RX ADMIN — CEFUROXIME AXETIL 500 MG: 250 TABLET ORAL at 22:38

## 2022-01-01 RX ADMIN — ATORVASTATIN CALCIUM 40 MG: 20 TABLET, FILM COATED ORAL at 19:56

## 2022-01-01 RX ADMIN — ATORVASTATIN CALCIUM 40 MG: 20 TABLET, FILM COATED ORAL at 21:13

## 2022-01-01 RX ADMIN — POTASSIUM CHLORIDE 20 MEQ: 1.5 POWDER, FOR SOLUTION ORAL at 08:00

## 2022-01-01 RX ADMIN — ACETAMINOPHEN 1000 MG: 500 TABLET ORAL at 11:09

## 2022-01-01 RX ADMIN — MIRTAZAPINE 15 MG: 15 TABLET, FILM COATED ORAL at 21:39

## 2022-01-01 RX ADMIN — MIRTAZAPINE 15 MG: 15 TABLET, FILM COATED ORAL at 21:28

## 2022-01-01 RX ADMIN — MINERAL SUPPLEMENT IRON 300 MG / 5 ML STRENGTH LIQUID 100 PER BOX UNFLAVORED 220 MG: at 08:59

## 2022-01-01 RX ADMIN — ATORVASTATIN CALCIUM 40 MG: 40 TABLET, FILM COATED ORAL at 21:51

## 2022-01-01 RX ADMIN — MINERAL SUPPLEMENT IRON 300 MG / 5 ML STRENGTH LIQUID 100 PER BOX UNFLAVORED 220 MG: at 10:55

## 2022-01-01 RX ADMIN — ACETAMINOPHEN 975 MG: 325 SUSPENSION ORAL at 21:21

## 2022-01-01 RX ADMIN — Medication 1 PACKET: at 09:40

## 2022-01-01 RX ADMIN — Medication 1 PACKET: at 15:43

## 2022-01-01 RX ADMIN — ACETAMINOPHEN 975 MG: 325 SUSPENSION ORAL at 08:33

## 2022-01-01 RX ADMIN — OXYCODONE HYDROCHLORIDE 5 MG: 5 SOLUTION ORAL at 14:50

## 2022-01-01 RX ADMIN — LOPERAMIDE HCL 4 MG: 1 SOLUTION ORAL at 18:09

## 2022-01-01 RX ADMIN — MINERAL SUPPLEMENT IRON 300 MG / 5 ML STRENGTH LIQUID 100 PER BOX UNFLAVORED 220 MG: at 09:36

## 2022-01-01 RX ADMIN — ACETAMINOPHEN 975 MG: 325 SUSPENSION ORAL at 15:07

## 2022-01-01 RX ADMIN — MINERAL SUPPLEMENT IRON 300 MG / 5 ML STRENGTH LIQUID 100 PER BOX UNFLAVORED 220 MG: at 08:04

## 2022-01-01 RX ADMIN — APIXABAN 5 MG: 5 TABLET, FILM COATED ORAL at 09:39

## 2022-01-01 RX ADMIN — CLINDAMYCIN PHOSPHATE 900 MG: 900 INJECTION, SOLUTION INTRAVENOUS at 09:51

## 2022-01-01 RX ADMIN — LOPERAMIDE HYDROCHLORIDE 2 MG: 2 CAPSULE ORAL at 12:10

## 2022-01-01 RX ADMIN — MIRTAZAPINE 15 MG: 15 TABLET, FILM COATED ORAL at 20:27

## 2022-01-01 RX ADMIN — Medication 1 CAPSULE: at 08:29

## 2022-01-01 RX ADMIN — GABAPENTIN 300 MG: 250 SOLUTION ORAL at 08:04

## 2022-01-01 RX ADMIN — Medication 1 TABLET: at 09:37

## 2022-01-01 RX ADMIN — METRONIDAZOLE 500 MG: 500 TABLET ORAL at 08:24

## 2022-01-01 RX ADMIN — POTASSIUM CHLORIDE 20 MEQ: 1.5 POWDER, FOR SOLUTION ORAL at 11:51

## 2022-01-01 RX ADMIN — METRONIDAZOLE 500 MG: 500 TABLET ORAL at 08:58

## 2022-01-01 RX ADMIN — CEFDINIR 300 MG: 125 POWDER, FOR SUSPENSION ORAL at 09:57

## 2022-01-01 RX ADMIN — ACETAMINOPHEN 975 MG: 325 SUSPENSION ORAL at 09:38

## 2022-01-01 RX ADMIN — POTASSIUM & SODIUM PHOSPHATES POWDER PACK 280-160-250 MG 1 PACKET: 280-160-250 PACK at 18:35

## 2022-01-01 RX ADMIN — Medication 1 TABLET: at 08:45

## 2022-01-01 RX ADMIN — APIXABAN 5 MG: 5 TABLET, FILM COATED ORAL at 08:45

## 2022-01-01 RX ADMIN — GABAPENTIN 600 MG: 250 SOLUTION ORAL at 20:33

## 2022-01-01 RX ADMIN — DIGOXIN 125 MCG: 125 TABLET ORAL at 07:47

## 2022-01-01 RX ADMIN — Medication 1 PACKET: at 09:58

## 2022-01-01 RX ADMIN — SODIUM CHLORIDE 500 ML: 9 INJECTION, SOLUTION INTRAVENOUS at 12:11

## 2022-01-01 RX ADMIN — GABAPENTIN 300 MG: 300 CAPSULE ORAL at 07:55

## 2022-01-01 RX ADMIN — ATORVASTATIN CALCIUM 40 MG: 20 TABLET, FILM COATED ORAL at 20:43

## 2022-01-01 RX ADMIN — Medication 1 PACKET: at 09:25

## 2022-01-01 RX ADMIN — Medication 1 CAPSULE: at 21:28

## 2022-01-01 RX ADMIN — CLOPIDOGREL BISULFATE 75 MG: 75 TABLET ORAL at 09:15

## 2022-01-01 RX ADMIN — CLOPIDOGREL BISULFATE 75 MG: 75 TABLET ORAL at 08:18

## 2022-01-01 RX ADMIN — POTASSIUM CHLORIDE 10 MEQ: 7.46 INJECTION, SOLUTION INTRAVENOUS at 17:57

## 2022-01-01 RX ADMIN — CEFUROXIME AXETIL 500 MG: 500 TABLET ORAL at 19:32

## 2022-01-01 RX ADMIN — Medication 1 PACKET: at 15:50

## 2022-01-01 RX ADMIN — GABAPENTIN 300 MG: 250 SUSPENSION ORAL at 11:58

## 2022-01-01 RX ADMIN — GABAPENTIN 300 MG: 300 CAPSULE ORAL at 21:21

## 2022-01-01 RX ADMIN — ACETAMINOPHEN 1000 MG: 500 TABLET, FILM COATED ORAL at 13:18

## 2022-01-01 RX ADMIN — CEFEPIME HYDROCHLORIDE 1 G: 1 INJECTION, POWDER, FOR SOLUTION INTRAMUSCULAR; INTRAVENOUS at 21:17

## 2022-01-01 RX ADMIN — POTASSIUM CHLORIDE AND SODIUM CHLORIDE: 900; 150 INJECTION, SOLUTION INTRAVENOUS at 10:00

## 2022-01-01 RX ADMIN — ATORVASTATIN CALCIUM 40 MG: 20 TABLET, FILM COATED ORAL at 19:32

## 2022-01-01 RX ADMIN — GABAPENTIN 300 MG: 300 CAPSULE ORAL at 02:53

## 2022-01-01 RX ADMIN — APIXABAN 5 MG: 5 TABLET, FILM COATED ORAL at 08:17

## 2022-01-01 RX ADMIN — Medication 1 CAPSULE: at 10:03

## 2022-01-01 RX ADMIN — MIRTAZAPINE 15 MG: 15 TABLET, FILM COATED ORAL at 21:02

## 2022-01-01 RX ADMIN — DOXYCYCLINE HYCLATE 100 MG: 100 CAPSULE ORAL at 20:06

## 2022-01-01 RX ADMIN — Medication 1 TABLET: at 08:34

## 2022-01-01 RX ADMIN — APIXABAN 5 MG: 5 TABLET, FILM COATED ORAL at 08:04

## 2022-01-01 RX ADMIN — Medication 1 PACKET: at 08:25

## 2022-01-01 RX ADMIN — METRONIDAZOLE 500 MG: 500 TABLET ORAL at 21:28

## 2022-01-01 RX ADMIN — PROPOFOL 50 MG: 10 INJECTION, EMULSION INTRAVENOUS at 10:21

## 2022-01-01 RX ADMIN — DIGOXIN 125 MCG: 0.05 SOLUTION ORAL at 07:47

## 2022-01-01 RX ADMIN — PHENYLEPHRINE HYDROCHLORIDE 100 MCG: 10 INJECTION INTRAVENOUS at 11:50

## 2022-01-01 RX ADMIN — APIXABAN 5 MG: 5 TABLET, FILM COATED ORAL at 08:25

## 2022-01-01 RX ADMIN — SODIUM CHLORIDE 250 MG: 9 INJECTION, SOLUTION INTRAVENOUS at 23:53

## 2022-01-01 RX ADMIN — Medication 1 PACKET: at 09:36

## 2022-01-01 RX ADMIN — Medication 1 PACKET: at 16:38

## 2022-01-01 RX ADMIN — Medication 1 CAPSULE: at 20:21

## 2022-01-01 RX ADMIN — CEFEPIME HYDROCHLORIDE 1 G: 1 INJECTION, POWDER, FOR SOLUTION INTRAMUSCULAR; INTRAVENOUS at 10:00

## 2022-01-01 RX ADMIN — POTASSIUM CHLORIDE 10 MEQ: 7.46 INJECTION, SOLUTION INTRAVENOUS at 13:18

## 2022-01-01 RX ADMIN — Medication 1 PACKET: at 07:48

## 2022-01-01 RX ADMIN — CLOPIDOGREL BISULFATE 75 MG: 75 TABLET ORAL at 08:45

## 2022-01-01 RX ADMIN — BUDESONIDE 3 MG: 3 CAPSULE ORAL at 08:01

## 2022-01-01 RX ADMIN — CEFTRIAXONE SODIUM 1 G: 1 INJECTION, POWDER, FOR SOLUTION INTRAMUSCULAR; INTRAVENOUS at 12:40

## 2022-01-01 RX ADMIN — ONDANSETRON 4 MG: 2 INJECTION INTRAMUSCULAR; INTRAVENOUS at 09:27

## 2022-01-01 RX ADMIN — MIRTAZAPINE 15 MG: 15 TABLET, FILM COATED ORAL at 20:36

## 2022-01-01 RX ADMIN — PHENYLEPHRINE HYDROCHLORIDE 50 MCG: 10 INJECTION INTRAVENOUS at 11:37

## 2022-01-01 RX ADMIN — Medication 1 PACKET: at 11:04

## 2022-01-01 RX ADMIN — APIXABAN 5 MG: 5 TABLET, FILM COATED ORAL at 07:47

## 2022-01-01 RX ADMIN — DOXYCYCLINE HYCLATE 100 MG: 100 CAPSULE ORAL at 08:25

## 2022-01-01 RX ADMIN — DIGOXIN 125 MCG: 125 TABLET ORAL at 09:24

## 2022-01-01 RX ADMIN — BUDESONIDE 3 MG: 3 CAPSULE ORAL at 09:38

## 2022-01-01 RX ADMIN — ACETAMINOPHEN 650 MG: 325 SUSPENSION ORAL at 22:14

## 2022-01-01 RX ADMIN — METRONIDAZOLE 500 MG: 500 INJECTION, SOLUTION INTRAVENOUS at 01:56

## 2022-01-01 RX ADMIN — ATORVASTATIN CALCIUM 40 MG: 40 TABLET, FILM COATED ORAL at 20:36

## 2022-01-01 RX ADMIN — DOXYCYCLINE HYCLATE 100 MG: 100 CAPSULE ORAL at 08:36

## 2022-01-01 RX ADMIN — MULTIVITAMIN 15 ML: LIQUID ORAL at 11:54

## 2022-01-01 RX ADMIN — POTASSIUM CHLORIDE 10 MEQ: 7.46 INJECTION, SOLUTION INTRAVENOUS at 15:33

## 2022-01-01 RX ADMIN — Medication 1 PACKET: at 12:27

## 2022-01-01 RX ADMIN — Medication 1 PACKET: at 08:23

## 2022-01-01 RX ADMIN — DEXMEDETOMIDINE 8 MCG: 100 INJECTION, SOLUTION, CONCENTRATE INTRAVENOUS at 11:32

## 2022-01-01 RX ADMIN — DIGOXIN 125 MCG: 0.05 SOLUTION ORAL at 08:00

## 2022-01-01 RX ADMIN — CEFTRIAXONE SODIUM 1 G: 1 INJECTION, POWDER, FOR SOLUTION INTRAMUSCULAR; INTRAVENOUS at 12:37

## 2022-01-01 RX ADMIN — DIGOXIN 125 MCG: 125 TABLET ORAL at 08:45

## 2022-01-01 RX ADMIN — CEFUROXIME AXETIL 500 MG: 250 TABLET ORAL at 08:58

## 2022-01-01 RX ADMIN — METRONIDAZOLE 500 MG: 500 TABLET ORAL at 09:37

## 2022-01-01 RX ADMIN — ACETAMINOPHEN 650 MG: 325 SUSPENSION ORAL at 22:13

## 2022-01-01 RX ADMIN — Medication 1 PACKET: at 21:52

## 2022-01-01 RX ADMIN — CLOPIDOGREL BISULFATE 75 MG: 75 TABLET ORAL at 08:40

## 2022-01-01 RX ADMIN — APIXABAN 5 MG: 5 TABLET, FILM COATED ORAL at 20:35

## 2022-01-01 RX ADMIN — LIDOCAINE HYDROCHLORIDE 40 MG: 20 INJECTION, SOLUTION INFILTRATION; PERINEURAL at 10:21

## 2022-01-01 RX ADMIN — CLOPIDOGREL BISULFATE 75 MG: 75 TABLET ORAL at 09:39

## 2022-01-01 RX ADMIN — DIGOXIN 125 MCG: 125 TABLET ORAL at 08:32

## 2022-01-01 RX ADMIN — DEXAMETHASONE SODIUM PHOSPHATE 4 MG: 4 INJECTION, SOLUTION INTRA-ARTICULAR; INTRALESIONAL; INTRAMUSCULAR; INTRAVENOUS; SOFT TISSUE at 10:30

## 2022-01-01 RX ADMIN — POTASSIUM CHLORIDE 20 MEQ: 1.5 POWDER, FOR SOLUTION ORAL at 08:08

## 2022-01-01 RX ADMIN — APIXABAN 5 MG: 5 TABLET, FILM COATED ORAL at 21:26

## 2022-01-01 RX ADMIN — GABAPENTIN 300 MG: 250 SOLUTION ORAL at 08:15

## 2022-01-01 RX ADMIN — ACETAMINOPHEN 975 MG: 325 SUSPENSION ORAL at 08:15

## 2022-01-01 RX ADMIN — MINERAL SUPPLEMENT IRON 300 MG / 5 ML STRENGTH LIQUID 100 PER BOX UNFLAVORED 220 MG: at 09:46

## 2022-01-01 RX ADMIN — MIRTAZAPINE 15 MG: 15 TABLET, FILM COATED ORAL at 21:53

## 2022-01-01 RX ADMIN — Medication 1 TABLET: at 09:38

## 2022-01-01 RX ADMIN — DIGOXIN 125 MCG: 125 TABLET ORAL at 08:14

## 2022-01-01 RX ADMIN — Medication 1 TABLET: at 09:36

## 2022-01-01 RX ADMIN — ATORVASTATIN CALCIUM 40 MG: 40 TABLET, FILM COATED ORAL at 08:13

## 2022-01-01 RX ADMIN — ACETAMINOPHEN 975 MG: 325 SUSPENSION ORAL at 20:11

## 2022-01-01 RX ADMIN — DIGOXIN 125 MCG: 125 TABLET ORAL at 08:13

## 2022-01-01 RX ADMIN — CLOPIDOGREL BISULFATE 75 MG: 75 TABLET ORAL at 09:25

## 2022-01-01 RX ADMIN — ACETAMINOPHEN 1000 MG: 500 TABLET, FILM COATED ORAL at 08:25

## 2022-01-01 RX ADMIN — ACETAMINOPHEN 1000 MG: 500 TABLET ORAL at 00:28

## 2022-01-01 RX ADMIN — GABAPENTIN 600 MG: 300 CAPSULE ORAL at 21:17

## 2022-01-01 RX ADMIN — MINERAL SUPPLEMENT IRON 300 MG / 5 ML STRENGTH LIQUID 100 PER BOX UNFLAVORED 220 MG: at 09:31

## 2022-01-01 RX ADMIN — ACETAMINOPHEN 1000 MG: 500 TABLET, FILM COATED ORAL at 21:18

## 2022-01-01 RX ADMIN — GABAPENTIN 300 MG: 300 CAPSULE ORAL at 21:28

## 2022-01-01 RX ADMIN — Medication 1 PACKET: at 08:26

## 2022-01-01 RX ADMIN — GABAPENTIN 600 MG: 250 SOLUTION ORAL at 21:01

## 2022-01-01 RX ADMIN — Medication 1 PACKET: at 20:11

## 2022-01-01 RX ADMIN — APIXABAN 5 MG: 5 TABLET, FILM COATED ORAL at 08:24

## 2022-01-01 RX ADMIN — APIXABAN 5 MG: 5 TABLET, FILM COATED ORAL at 21:43

## 2022-01-01 RX ADMIN — GABAPENTIN 300 MG: 300 CAPSULE ORAL at 20:00

## 2022-01-01 RX ADMIN — MULTIVITAMIN 15 ML: LIQUID ORAL at 09:47

## 2022-01-01 RX ADMIN — MIRTAZAPINE 15 MG: 15 TABLET, FILM COATED ORAL at 22:40

## 2022-01-01 RX ADMIN — POTASSIUM CHLORIDE 10 MEQ: 7.46 INJECTION, SOLUTION INTRAVENOUS at 14:32

## 2022-01-01 RX ADMIN — GABAPENTIN 300 MG: 300 CAPSULE ORAL at 09:39

## 2022-01-01 RX ADMIN — GABAPENTIN 300 MG: 300 CAPSULE ORAL at 08:58

## 2022-01-01 RX ADMIN — GABAPENTIN 300 MG: 300 CAPSULE ORAL at 08:32

## 2022-01-01 RX ADMIN — GABAPENTIN 300 MG: 250 SUSPENSION ORAL at 09:25

## 2022-01-01 RX ADMIN — SUGAMMADEX 150 MG: 100 INJECTION, SOLUTION INTRAVENOUS at 11:27

## 2022-01-01 RX ADMIN — SODIUM CHLORIDE 500 ML: 9 INJECTION, SOLUTION INTRAVENOUS at 09:40

## 2022-01-01 RX ADMIN — FUROSEMIDE 20 MG: 10 INJECTION, SOLUTION INTRAMUSCULAR; INTRAVENOUS at 09:23

## 2022-01-01 RX ADMIN — CLOPIDOGREL BISULFATE 75 MG: 75 TABLET ORAL at 08:14

## 2022-01-01 RX ADMIN — METRONIDAZOLE 500 MG: 500 INJECTION, SOLUTION INTRAVENOUS at 01:20

## 2022-01-01 RX ADMIN — POTASSIUM CHLORIDE 40 MEQ: 1.5 POWDER, FOR SOLUTION ORAL at 09:49

## 2022-01-01 RX ADMIN — DIGOXIN 125 MCG: 125 TABLET ORAL at 08:05

## 2022-01-01 RX ADMIN — SODIUM CHLORIDE 250 ML: 9 INJECTION, SOLUTION INTRAVENOUS at 20:49

## 2022-01-01 RX ADMIN — Medication 1 PACKET: at 21:29

## 2022-01-01 RX ADMIN — APIXABAN 5 MG: 5 TABLET, FILM COATED ORAL at 21:13

## 2022-01-01 RX ADMIN — APIXABAN 5 MG: 5 TABLET, FILM COATED ORAL at 09:06

## 2022-01-01 RX ADMIN — POTASSIUM CHLORIDE 20 MEQ: 1.5 POWDER, FOR SOLUTION ORAL at 07:48

## 2022-01-01 RX ADMIN — GABAPENTIN 600 MG: 250 SOLUTION ORAL at 20:31

## 2022-01-01 RX ADMIN — ACETAMINOPHEN 1000 MG: 500 TABLET, FILM COATED ORAL at 07:55

## 2022-01-01 RX ADMIN — APIXABAN 5 MG: 5 TABLET, FILM COATED ORAL at 08:15

## 2022-01-01 RX ADMIN — ACETAMINOPHEN 1000 MG: 500 TABLET, FILM COATED ORAL at 14:31

## 2022-01-01 RX ADMIN — ACETAMINOPHEN 1000 MG: 500 TABLET, FILM COATED ORAL at 22:07

## 2022-01-01 RX ADMIN — Medication 1 TABLET: at 08:15

## 2022-01-01 RX ADMIN — ACETAMINOPHEN 1000 MG: 500 TABLET, FILM COATED ORAL at 17:00

## 2022-01-01 RX ADMIN — Medication 1 CAPSULE: at 08:00

## 2022-01-01 RX ADMIN — Medication 1 PACKET: at 16:22

## 2022-01-01 RX ADMIN — GABAPENTIN 600 MG: 300 CAPSULE ORAL at 21:18

## 2022-01-01 RX ADMIN — DIGOXIN 125 MCG: 125 TABLET ORAL at 09:28

## 2022-01-01 RX ADMIN — MINERAL SUPPLEMENT IRON 300 MG / 5 ML STRENGTH LIQUID 100 PER BOX UNFLAVORED 220 MG: at 08:27

## 2022-01-01 RX ADMIN — GABAPENTIN 300 MG: 250 SUSPENSION ORAL at 11:55

## 2022-01-01 RX ADMIN — GABAPENTIN 300 MG: 250 SOLUTION ORAL at 08:17

## 2022-01-01 RX ADMIN — Medication 1 TABLET: at 09:17

## 2022-01-01 RX ADMIN — BUDESONIDE 3 MG: 3 CAPSULE ORAL at 08:42

## 2022-01-01 RX ADMIN — GADOBUTROL 6 ML: 604.72 INJECTION INTRAVENOUS at 14:55

## 2022-01-01 RX ADMIN — ATORVASTATIN CALCIUM 40 MG: 40 TABLET, FILM COATED ORAL at 20:27

## 2022-01-01 RX ADMIN — Medication 1 PACKET: at 22:35

## 2022-01-01 RX ADMIN — APIXABAN 5 MG: 5 TABLET, FILM COATED ORAL at 21:08

## 2022-01-01 RX ADMIN — APIXABAN 5 MG: 5 TABLET, FILM COATED ORAL at 08:42

## 2022-01-01 RX ADMIN — Medication 1 TABLET: at 08:04

## 2022-01-01 RX ADMIN — METRONIDAZOLE 500 MG: 500 TABLET ORAL at 21:51

## 2022-01-01 RX ADMIN — CALCIUM CARBONATE 1500 MG: 1250 SUSPENSION ORAL at 18:04

## 2022-01-01 RX ADMIN — GABAPENTIN 600 MG: 250 SOLUTION ORAL at 20:36

## 2022-01-01 RX ADMIN — CEFTRIAXONE SODIUM 2 G: 2 INJECTION, POWDER, FOR SOLUTION INTRAMUSCULAR; INTRAVENOUS at 04:51

## 2022-01-01 RX ADMIN — APIXABAN 5 MG: 5 TABLET, FILM COATED ORAL at 20:33

## 2022-01-01 RX ADMIN — Medication 1 TABLET: at 08:46

## 2022-01-01 RX ADMIN — CEFUROXIME AXETIL 500 MG: 500 TABLET ORAL at 08:34

## 2022-01-01 RX ADMIN — GABAPENTIN 300 MG: 250 SUSPENSION ORAL at 12:31

## 2022-01-01 RX ADMIN — MIRTAZAPINE 15 MG: 15 TABLET, FILM COATED ORAL at 21:22

## 2022-01-01 RX ADMIN — CEFUROXIME AXETIL 500 MG: 500 TABLET ORAL at 19:57

## 2022-01-01 RX ADMIN — Medication 1 PACKET: at 08:11

## 2022-01-01 RX ADMIN — Medication 1 TABLET: at 20:53

## 2022-01-01 RX ADMIN — APIXABAN 5 MG: 5 TABLET, FILM COATED ORAL at 09:15

## 2022-01-01 RX ADMIN — Medication 1 TABLET: at 21:51

## 2022-01-01 RX ADMIN — DIGOXIN 125 MCG: 125 TABLET ORAL at 09:05

## 2022-01-01 RX ADMIN — DIGOXIN 125 MCG: 125 TABLET ORAL at 08:42

## 2022-01-01 RX ADMIN — MIRTAZAPINE 15 MG: 15 TABLET, FILM COATED ORAL at 20:32

## 2022-01-01 RX ADMIN — Medication 1 PACKET: at 10:23

## 2022-01-01 RX ADMIN — DIGOXIN 125 MCG: 125 TABLET ORAL at 08:17

## 2022-01-01 RX ADMIN — CLOPIDOGREL BISULFATE 75 MG: 75 TABLET ORAL at 08:57

## 2022-01-01 RX ADMIN — APIXABAN 5 MG: 5 TABLET, FILM COATED ORAL at 21:19

## 2022-01-01 RX ADMIN — PHENYLEPHRINE HYDROCHLORIDE 300 MCG: 10 INJECTION INTRAVENOUS at 10:21

## 2022-01-01 RX ADMIN — GABAPENTIN 600 MG: 300 CAPSULE ORAL at 22:27

## 2022-01-01 RX ADMIN — DIGOXIN 125 MCG: 125 TABLET ORAL at 09:15

## 2022-01-01 RX ADMIN — CALCIUM CARBONATE 1500 MG: 1250 SUSPENSION ORAL at 18:56

## 2022-01-01 RX ADMIN — Medication 1 CAPSULE: at 08:59

## 2022-01-01 RX ADMIN — MIRTAZAPINE 15 MG: 15 TABLET, FILM COATED ORAL at 20:02

## 2022-01-01 RX ADMIN — FENTANYL CITRATE 25 MCG: 50 INJECTION, SOLUTION INTRAMUSCULAR; INTRAVENOUS at 08:23

## 2022-01-01 RX ADMIN — APIXABAN 5 MG: 5 TABLET, FILM COATED ORAL at 09:37

## 2022-01-01 RX ADMIN — Medication 1 PACKET: at 08:41

## 2022-01-01 RX ADMIN — APIXABAN 5 MG: 5 TABLET, FILM COATED ORAL at 08:18

## 2022-01-01 RX ADMIN — Medication 1 TABLET: at 08:24

## 2022-01-01 RX ADMIN — MIRTAZAPINE 15 MG: 15 TABLET, FILM COATED ORAL at 21:13

## 2022-01-01 RX ADMIN — APIXABAN 5 MG: 5 TABLET, FILM COATED ORAL at 08:50

## 2022-01-01 RX ADMIN — MINERAL SUPPLEMENT IRON 300 MG / 5 ML STRENGTH LIQUID 100 PER BOX UNFLAVORED 220 MG: at 09:57

## 2022-01-01 RX ADMIN — APIXABAN 5 MG: 5 TABLET, FILM COATED ORAL at 22:13

## 2022-01-01 RX ADMIN — ATORVASTATIN CALCIUM 40 MG: 40 TABLET, FILM COATED ORAL at 22:07

## 2022-01-01 RX ADMIN — DIGOXIN 125 MCG: 0.05 SOLUTION ORAL at 09:47

## 2022-01-01 RX ADMIN — DIGOXIN 125 MCG: 125 TABLET ORAL at 08:03

## 2022-01-01 RX ADMIN — MINERAL SUPPLEMENT IRON 300 MG / 5 ML STRENGTH LIQUID 100 PER BOX UNFLAVORED 220 MG: at 08:24

## 2022-01-01 RX ADMIN — ACETAMINOPHEN 975 MG: 325 SUSPENSION ORAL at 14:47

## 2022-01-01 RX ADMIN — ACETAMINOPHEN 1000 MG: 500 TABLET, FILM COATED ORAL at 14:49

## 2022-01-01 RX ADMIN — Medication 1 PACKET: at 08:36

## 2022-01-01 RX ADMIN — Medication 1 TABLET: at 21:27

## 2022-01-01 RX ADMIN — CEFUROXIME AXETIL 500 MG: 250 TABLET ORAL at 21:56

## 2022-01-01 RX ADMIN — Medication 1 PACKET: at 20:14

## 2022-01-01 RX ADMIN — GABAPENTIN 600 MG: 250 SOLUTION ORAL at 21:21

## 2022-01-01 RX ADMIN — APIXABAN 5 MG: 5 TABLET, FILM COATED ORAL at 08:36

## 2022-01-01 RX ADMIN — GABAPENTIN 300 MG: 300 CAPSULE ORAL at 09:14

## 2022-01-01 RX ADMIN — METRONIDAZOLE 500 MG: 500 TABLET ORAL at 15:19

## 2022-01-01 RX ADMIN — ACETAMINOPHEN 1000 MG: 500 TABLET, FILM COATED ORAL at 13:06

## 2022-01-01 RX ADMIN — Medication 1 PACKET: at 16:59

## 2022-01-01 RX ADMIN — HYDROMORPHONE HYDROCHLORIDE 0.2 MG: 0.2 INJECTION, SOLUTION INTRAMUSCULAR; INTRAVENOUS; SUBCUTANEOUS at 17:57

## 2022-01-01 RX ADMIN — SODIUM CHLORIDE 250 MG: 9 INJECTION, SOLUTION INTRAVENOUS at 00:26

## 2022-01-01 RX ADMIN — POTASSIUM CHLORIDE 10 MEQ: 7.46 INJECTION, SOLUTION INTRAVENOUS at 12:04

## 2022-01-01 RX ADMIN — GABAPENTIN 300 MG: 300 CAPSULE ORAL at 21:54

## 2022-01-01 RX ADMIN — METRONIDAZOLE 500 MG: 500 TABLET ORAL at 17:00

## 2022-01-01 RX ADMIN — DIGOXIN 125 MCG: 125 TABLET ORAL at 09:39

## 2022-01-01 RX ADMIN — ACETAMINOPHEN 975 MG: 325 SUSPENSION ORAL at 21:05

## 2022-01-01 RX ADMIN — Medication 1 TABLET: at 20:36

## 2022-01-01 RX ADMIN — MINERAL SUPPLEMENT IRON 300 MG / 5 ML STRENGTH LIQUID 100 PER BOX UNFLAVORED 220 MG: at 08:17

## 2022-01-01 RX ADMIN — MIRTAZAPINE 15 MG: 15 TABLET, FILM COATED ORAL at 21:09

## 2022-01-01 RX ADMIN — ACETAMINOPHEN 975 MG: 325 SUSPENSION ORAL at 08:18

## 2022-01-01 RX ADMIN — CALCIUM CARBONATE 1500 MG: 1250 SUSPENSION ORAL at 07:48

## 2022-01-01 RX ADMIN — LOPERAMIDE HCL 4 MG: 1 SOLUTION ORAL at 13:46

## 2022-01-01 RX ADMIN — MIRTAZAPINE 15 MG: 15 TABLET, FILM COATED ORAL at 22:48

## 2022-01-01 RX ADMIN — ATORVASTATIN CALCIUM 40 MG: 40 TABLET, FILM COATED ORAL at 20:33

## 2022-01-01 RX ADMIN — Medication 1 TABLET: at 08:00

## 2022-01-01 RX ADMIN — CEFUROXIME AXETIL 500 MG: 250 TABLET ORAL at 09:15

## 2022-01-01 RX ADMIN — APIXABAN 5 MG: 5 TABLET, FILM COATED ORAL at 20:00

## 2022-01-01 RX ADMIN — CEFDINIR 300 MG: 125 POWDER, FOR SUSPENSION ORAL at 20:33

## 2022-01-01 RX ADMIN — CLOPIDOGREL BISULFATE 75 MG: 75 TABLET ORAL at 00:21

## 2022-01-01 RX ADMIN — GABAPENTIN 300 MG: 250 SUSPENSION ORAL at 11:14

## 2022-01-01 RX ADMIN — ACETAMINOPHEN 1000 MG: 500 TABLET, FILM COATED ORAL at 08:00

## 2022-01-01 RX ADMIN — ACETAMINOPHEN 975 MG: 325 SUSPENSION ORAL at 08:03

## 2022-01-01 RX ADMIN — Medication 1 PACKET: at 09:12

## 2022-01-01 RX ADMIN — LOPERAMIDE HYDROCHLORIDE 2 MG: 2 CAPSULE ORAL at 11:44

## 2022-01-01 RX ADMIN — GABAPENTIN 300 MG: 250 SOLUTION ORAL at 09:39

## 2022-01-01 RX ADMIN — CLOPIDOGREL BISULFATE 75 MG: 75 TABLET ORAL at 08:24

## 2022-01-01 RX ADMIN — CLOPIDOGREL BISULFATE 75 MG: 75 TABLET ORAL at 09:26

## 2022-01-01 RX ADMIN — GABAPENTIN 300 MG: 300 CAPSULE ORAL at 09:00

## 2022-01-01 RX ADMIN — MIRTAZAPINE 15 MG: 15 TABLET, FILM COATED ORAL at 21:54

## 2022-01-01 RX ADMIN — Medication 1 TABLET: at 21:06

## 2022-01-01 RX ADMIN — Medication 1 CAPSULE: at 07:55

## 2022-01-01 RX ADMIN — Medication 1 TABLET: at 10:56

## 2022-01-01 RX ADMIN — ATORVASTATIN CALCIUM 40 MG: 40 TABLET, FILM COATED ORAL at 21:56

## 2022-01-01 RX ADMIN — GABAPENTIN 600 MG: 250 SOLUTION ORAL at 05:05

## 2022-01-01 RX ADMIN — ATORVASTATIN CALCIUM 40 MG: 40 TABLET, FILM COATED ORAL at 21:22

## 2022-01-01 RX ADMIN — CEFTRIAXONE SODIUM 2 G: 2 INJECTION, POWDER, FOR SOLUTION INTRAMUSCULAR; INTRAVENOUS at 15:47

## 2022-01-01 RX ADMIN — Medication 1 PACKET: at 18:08

## 2022-01-01 RX ADMIN — Medication 1 CAPSULE: at 07:47

## 2022-01-01 RX ADMIN — Medication 1 PACKET: at 16:49

## 2022-01-01 RX ADMIN — LOPERAMIDE HCL 4 MG: 1 SOLUTION ORAL at 13:13

## 2022-01-01 RX ADMIN — Medication 1 PACKET: at 20:28

## 2022-01-01 RX ADMIN — CLOPIDOGREL BISULFATE 75 MG: 75 TABLET ORAL at 08:12

## 2022-01-01 RX ADMIN — Medication 1 CAPSULE: at 20:10

## 2022-01-01 RX ADMIN — MULTIVITAMIN 15 ML: LIQUID ORAL at 08:00

## 2022-01-01 RX ADMIN — PHENYLEPHRINE HYDROCHLORIDE 200 MCG: 10 INJECTION INTRAVENOUS at 10:32

## 2022-01-01 RX ADMIN — MIRTAZAPINE 15 MG: 15 TABLET, FILM COATED ORAL at 21:26

## 2022-01-01 RX ADMIN — CLOPIDOGREL BISULFATE 75 MG: 75 TABLET ORAL at 08:50

## 2022-01-01 RX ADMIN — MINERAL SUPPLEMENT IRON 300 MG / 5 ML STRENGTH LIQUID 100 PER BOX UNFLAVORED 220 MG: at 09:39

## 2022-01-01 RX ADMIN — ACETAMINOPHEN 650 MG: 325 TABLET ORAL at 01:28

## 2022-01-01 RX ADMIN — GABAPENTIN 300 MG: 250 SUSPENSION ORAL at 09:12

## 2022-01-01 RX ADMIN — APIXABAN 5 MG: 5 TABLET, FILM COATED ORAL at 22:07

## 2022-01-01 RX ADMIN — AZITHROMYCIN MONOHYDRATE 500 MG: 500 INJECTION, POWDER, LYOPHILIZED, FOR SOLUTION INTRAVENOUS at 23:47

## 2022-01-01 RX ADMIN — APIXABAN 5 MG: 5 TABLET, FILM COATED ORAL at 21:22

## 2022-01-01 RX ADMIN — Medication 1 PACKET: at 08:50

## 2022-01-01 RX ADMIN — ACETAMINOPHEN 1000 MG: 500 TABLET, FILM COATED ORAL at 14:26

## 2022-01-01 RX ADMIN — HYDROMORPHONE HYDROCHLORIDE 0.2 MG: 0.2 INJECTION, SOLUTION INTRAMUSCULAR; INTRAVENOUS; SUBCUTANEOUS at 09:54

## 2022-01-01 RX ADMIN — ATORVASTATIN CALCIUM 40 MG: 20 TABLET, FILM COATED ORAL at 22:26

## 2022-01-01 RX ADMIN — GABAPENTIN 300 MG: 250 SUSPENSION ORAL at 09:47

## 2022-01-01 RX ADMIN — CLOPIDOGREL BISULFATE 75 MG: 75 TABLET ORAL at 09:52

## 2022-01-01 RX ADMIN — MINERAL SUPPLEMENT IRON 300 MG / 5 ML STRENGTH LIQUID 100 PER BOX UNFLAVORED 220 MG: at 10:23

## 2022-01-01 RX ADMIN — SODIUM PHOSPHATE, MONOBASIC, MONOHYDRATE 9 MMOL: 276; 142 INJECTION, SOLUTION INTRAVENOUS at 10:35

## 2022-01-01 RX ADMIN — Medication 1 TABLET: at 22:12

## 2022-01-01 RX ADMIN — DIGOXIN 125 MCG: 125 TABLET ORAL at 09:52

## 2022-01-01 RX ADMIN — GABAPENTIN 600 MG: 250 SOLUTION ORAL at 22:34

## 2022-01-01 RX ADMIN — CLOPIDOGREL BISULFATE 75 MG: 75 TABLET ORAL at 09:07

## 2022-01-01 RX ADMIN — ONDANSETRON 4 MG: 2 INJECTION INTRAMUSCULAR; INTRAVENOUS at 10:30

## 2022-01-01 RX ADMIN — ESMOLOL HYDROCHLORIDE 20 MG: 10 INJECTION, SOLUTION INTRAVENOUS at 11:06

## 2022-01-01 RX ADMIN — GABAPENTIN 300 MG: 250 SUSPENSION ORAL at 08:38

## 2022-01-01 RX ADMIN — MIRTAZAPINE 15 MG: 15 TABLET, FILM COATED ORAL at 21:47

## 2022-01-01 RX ADMIN — GABAPENTIN 600 MG: 300 CAPSULE ORAL at 21:26

## 2022-01-01 RX ADMIN — ACETAMINOPHEN 975 MG: 325 SUSPENSION ORAL at 09:58

## 2022-01-01 RX ADMIN — Medication 1 PACKET: at 09:18

## 2022-01-01 RX ADMIN — Medication 1 CAPSULE: at 09:25

## 2022-01-01 RX ADMIN — ATORVASTATIN CALCIUM 40 MG: 40 TABLET, FILM COATED ORAL at 07:47

## 2022-01-01 RX ADMIN — Medication 1 TABLET: at 19:58

## 2022-01-01 RX ADMIN — BUDESONIDE 3 MG: 3 CAPSULE ORAL at 21:08

## 2022-01-01 RX ADMIN — POTASSIUM CHLORIDE 40 MEQ: 20 SOLUTION ORAL at 10:35

## 2022-01-01 RX ADMIN — ATORVASTATIN CALCIUM 40 MG: 20 TABLET, FILM COATED ORAL at 20:05

## 2022-01-01 RX ADMIN — DIGOXIN 125 MCG: 125 TABLET ORAL at 08:57

## 2022-01-01 RX ADMIN — GABAPENTIN 300 MG: 300 CAPSULE ORAL at 09:24

## 2022-01-01 RX ADMIN — APIXABAN 5 MG: 5 TABLET, FILM COATED ORAL at 08:34

## 2022-01-01 RX ADMIN — GABAPENTIN 300 MG: 250 SOLUTION ORAL at 08:03

## 2022-01-01 RX ADMIN — CLOPIDOGREL BISULFATE 75 MG: 75 TABLET ORAL at 08:00

## 2022-01-01 RX ADMIN — APIXABAN 5 MG: 5 TABLET, FILM COATED ORAL at 21:57

## 2022-01-01 RX ADMIN — ACETAMINOPHEN 650 MG: 325 SUSPENSION ORAL at 15:00

## 2022-01-01 RX ADMIN — CLOPIDOGREL BISULFATE 75 MG: 75 TABLET ORAL at 22:26

## 2022-01-01 RX ADMIN — Medication 1 TABLET: at 21:38

## 2022-01-01 RX ADMIN — MINERAL SUPPLEMENT IRON 300 MG / 5 ML STRENGTH LIQUID 100 PER BOX UNFLAVORED 220 MG: at 08:45

## 2022-01-01 RX ADMIN — ATORVASTATIN CALCIUM 40 MG: 40 TABLET, FILM COATED ORAL at 21:08

## 2022-01-01 RX ADMIN — ATORVASTATIN CALCIUM 40 MG: 40 TABLET, FILM COATED ORAL at 21:26

## 2022-01-01 RX ADMIN — Medication 1 CAPSULE: at 08:04

## 2022-01-01 RX ADMIN — Medication 1 PACKET: at 16:00

## 2022-01-01 RX ADMIN — POTASSIUM CHLORIDE 20 MEQ: 750 TABLET, EXTENDED RELEASE ORAL at 11:32

## 2022-01-01 RX ADMIN — Medication 1 PACKET: at 09:19

## 2022-01-01 RX ADMIN — GABAPENTIN 300 MG: 300 CAPSULE ORAL at 09:05

## 2022-01-01 RX ADMIN — GADOBUTROL 6 ML: 604.72 INJECTION INTRAVENOUS at 23:30

## 2022-01-01 RX ADMIN — Medication 1 PACKET: at 17:00

## 2022-01-01 RX ADMIN — POTASSIUM CHLORIDE 20 MEQ: 20 SOLUTION ORAL at 10:56

## 2022-01-01 RX ADMIN — CEFDINIR 300 MG: 125 POWDER, FOR SUSPENSION ORAL at 08:33

## 2022-01-01 RX ADMIN — Medication 1 PACKET: at 21:21

## 2022-01-01 RX ADMIN — CEFUROXIME AXETIL 500 MG: 250 TABLET ORAL at 20:26

## 2022-01-01 RX ADMIN — LOPERAMIDE HCL 4 MG: 1 SOLUTION ORAL at 20:33

## 2022-01-01 RX ADMIN — POTASSIUM CHLORIDE 10 MEQ: 7.46 INJECTION, SOLUTION INTRAVENOUS at 10:36

## 2022-01-01 RX ADMIN — ATORVASTATIN CALCIUM 40 MG: 40 TABLET, FILM COATED ORAL at 08:12

## 2022-01-01 RX ADMIN — MINERAL SUPPLEMENT IRON 300 MG / 5 ML STRENGTH LIQUID 100 PER BOX UNFLAVORED 220 MG: at 09:15

## 2022-01-01 RX ADMIN — DIGOXIN 125 MCG: 125 TABLET ORAL at 08:29

## 2022-01-01 RX ADMIN — ONDANSETRON 4 MG: 2 INJECTION INTRAMUSCULAR; INTRAVENOUS at 11:37

## 2022-01-01 RX ADMIN — CALCIUM CARBONATE 1500 MG: 1250 SUSPENSION ORAL at 09:47

## 2022-01-01 RX ADMIN — APIXABAN 5 MG: 5 TABLET, FILM COATED ORAL at 21:27

## 2022-01-01 RX ADMIN — AZITHROMYCIN MONOHYDRATE 500 MG: 500 INJECTION, POWDER, LYOPHILIZED, FOR SOLUTION INTRAVENOUS at 22:10

## 2022-01-01 RX ADMIN — BARIUM SULFATE 20 ML: 400 SUSPENSION ORAL at 09:19

## 2022-01-01 RX ADMIN — ACETAMINOPHEN 1000 MG: 325 SUSPENSION ORAL at 05:05

## 2022-01-01 RX ADMIN — ROCURONIUM BROMIDE 10 MG: 50 INJECTION, SOLUTION INTRAVENOUS at 10:40

## 2022-01-01 RX ADMIN — GABAPENTIN 600 MG: 250 SOLUTION ORAL at 23:29

## 2022-01-01 RX ADMIN — ATORVASTATIN CALCIUM 40 MG: 40 TABLET, FILM COATED ORAL at 21:06

## 2022-01-01 RX ADMIN — ACETAMINOPHEN 1000 MG: 500 TABLET, FILM COATED ORAL at 13:16

## 2022-01-01 RX ADMIN — Medication 1 PACKET: at 15:09

## 2022-01-01 RX ADMIN — APIXABAN 5 MG: 5 TABLET, FILM COATED ORAL at 00:21

## 2022-01-01 RX ADMIN — APIXABAN 5 MG: 5 TABLET, FILM COATED ORAL at 09:24

## 2022-01-01 RX ADMIN — ACETAMINOPHEN 975 MG: 325 SUSPENSION ORAL at 21:50

## 2022-01-01 RX ADMIN — ATORVASTATIN CALCIUM 40 MG: 40 TABLET, FILM COATED ORAL at 20:30

## 2022-01-01 RX ADMIN — GABAPENTIN 300 MG: 300 CAPSULE ORAL at 08:24

## 2022-01-01 RX ADMIN — HYDROMORPHONE HYDROCHLORIDE 0.2 MG: 0.2 INJECTION, SOLUTION INTRAMUSCULAR; INTRAVENOUS; SUBCUTANEOUS at 20:20

## 2022-01-01 RX ADMIN — APIXABAN 5 MG: 5 TABLET, FILM COATED ORAL at 19:57

## 2022-01-01 RX ADMIN — Medication 1 TABLET: at 08:42

## 2022-01-01 RX ADMIN — MAGNESIUM SULFATE HEPTAHYDRATE 4 G: 40 INJECTION, SOLUTION INTRAVENOUS at 11:39

## 2022-01-01 RX ADMIN — CALCIUM CARBONATE 1500 MG: 1250 SUSPENSION ORAL at 08:00

## 2022-01-01 RX ADMIN — Medication 1 PACKET: at 16:26

## 2022-01-01 RX ADMIN — ATORVASTATIN CALCIUM 40 MG: 20 TABLET, FILM COATED ORAL at 21:50

## 2022-01-01 RX ADMIN — Medication 1 PACKET: at 21:00

## 2022-01-01 RX ADMIN — DIGOXIN 125 MCG: 125 TABLET ORAL at 08:36

## 2022-01-01 RX ADMIN — APIXABAN 5 MG: 5 TABLET, FILM COATED ORAL at 21:50

## 2022-01-01 RX ADMIN — GABAPENTIN 300 MG: 250 SUSPENSION ORAL at 07:59

## 2022-01-01 RX ADMIN — CEFUROXIME AXETIL 500 MG: 500 TABLET ORAL at 19:42

## 2022-01-01 RX ADMIN — MINERAL SUPPLEMENT IRON 300 MG / 5 ML STRENGTH LIQUID 100 PER BOX UNFLAVORED 220 MG: at 15:58

## 2022-01-01 RX ADMIN — ATORVASTATIN CALCIUM 40 MG: 40 TABLET, FILM COATED ORAL at 09:38

## 2022-01-01 RX ADMIN — GABAPENTIN 600 MG: 300 CAPSULE ORAL at 20:32

## 2022-01-01 RX ADMIN — POTASSIUM CHLORIDE 20 MEQ: 1.5 POWDER, FOR SOLUTION ORAL at 11:30

## 2022-01-01 RX ADMIN — Medication 1 PACKET: at 09:33

## 2022-01-01 RX ADMIN — Medication 1 PACKET: at 09:43

## 2022-01-01 RX ADMIN — GABAPENTIN 600 MG: 250 SOLUTION ORAL at 21:18

## 2022-01-01 RX ADMIN — METRONIDAZOLE 500 MG: 500 INJECTION, SOLUTION INTRAVENOUS at 14:16

## 2022-01-01 RX ADMIN — ACETAMINOPHEN 650 MG: 325 SUSPENSION ORAL at 16:20

## 2022-01-01 RX ADMIN — ACETAMINOPHEN 1000 MG: 500 TABLET, FILM COATED ORAL at 08:58

## 2022-01-01 RX ADMIN — Medication 1 PACKET: at 09:52

## 2022-01-01 RX ADMIN — DOXYCYCLINE HYCLATE 100 MG: 100 CAPSULE ORAL at 08:34

## 2022-01-01 RX ADMIN — HYDROMORPHONE HYDROCHLORIDE 0.2 MG: 0.2 INJECTION, SOLUTION INTRAMUSCULAR; INTRAVENOUS; SUBCUTANEOUS at 15:32

## 2022-01-01 RX ADMIN — ACETAMINOPHEN 1000 MG: 500 TABLET, FILM COATED ORAL at 19:57

## 2022-01-01 RX ADMIN — SODIUM CHLORIDE 250 MG: 9 INJECTION, SOLUTION INTRAVENOUS at 23:36

## 2022-01-01 RX ADMIN — ACETAMINOPHEN 975 MG: 325 SUSPENSION ORAL at 08:39

## 2022-01-01 RX ADMIN — APIXABAN 5 MG: 5 TABLET, FILM COATED ORAL at 21:39

## 2022-01-01 RX ADMIN — CLOPIDOGREL BISULFATE 75 MG: 75 TABLET ORAL at 10:03

## 2022-01-01 RX ADMIN — ATORVASTATIN CALCIUM 40 MG: 40 TABLET, FILM COATED ORAL at 21:38

## 2022-01-01 RX ADMIN — MIRTAZAPINE 15 MG: 15 TABLET, FILM COATED ORAL at 21:06

## 2022-01-01 RX ADMIN — CEFEPIME HYDROCHLORIDE 1 G: 1 INJECTION, POWDER, FOR SOLUTION INTRAMUSCULAR; INTRAVENOUS at 08:34

## 2022-01-01 RX ADMIN — Medication 1 TABLET: at 21:20

## 2022-01-01 RX ADMIN — ATORVASTATIN CALCIUM 40 MG: 20 TABLET, FILM COATED ORAL at 20:36

## 2022-01-01 RX ADMIN — GABAPENTIN 300 MG: 250 SOLUTION ORAL at 08:40

## 2022-01-01 RX ADMIN — MIRTAZAPINE 15 MG: 15 TABLET, FILM COATED ORAL at 21:19

## 2022-01-01 RX ADMIN — Medication 1 TABLET: at 08:17

## 2022-01-01 RX ADMIN — HYDROMORPHONE HYDROCHLORIDE 0.2 MG: 0.2 INJECTION, SOLUTION INTRAMUSCULAR; INTRAVENOUS; SUBCUTANEOUS at 15:37

## 2022-01-01 RX ADMIN — GABAPENTIN 600 MG: 250 SOLUTION ORAL at 21:29

## 2022-01-01 RX ADMIN — APIXABAN 5 MG: 5 TABLET, FILM COATED ORAL at 05:05

## 2022-01-01 RX ADMIN — CALCIUM CARBONATE 1500 MG: 1250 SUSPENSION ORAL at 08:09

## 2022-01-01 RX ADMIN — CLOPIDOGREL BISULFATE 75 MG: 75 TABLET ORAL at 08:58

## 2022-01-01 RX ADMIN — DIGOXIN 125 MCG: 125 TABLET ORAL at 08:23

## 2022-01-01 RX ADMIN — Medication 1 TABLET: at 07:47

## 2022-01-01 RX ADMIN — ATORVASTATIN CALCIUM 40 MG: 40 TABLET, FILM COATED ORAL at 08:24

## 2022-01-01 RX ADMIN — Medication 1 CAPSULE: at 08:24

## 2022-01-01 RX ADMIN — DIGOXIN 125 MCG: 125 TABLET ORAL at 08:46

## 2022-01-01 RX ADMIN — GABAPENTIN 600 MG: 250 SOLUTION ORAL at 21:08

## 2022-01-01 RX ADMIN — Medication 1 TABLET: at 09:39

## 2022-01-01 RX ADMIN — GABAPENTIN 300 MG: 300 CAPSULE ORAL at 08:00

## 2022-01-01 RX ADMIN — ACETAMINOPHEN 975 MG: 325 SUSPENSION ORAL at 15:35

## 2022-01-01 RX ADMIN — APIXABAN 5 MG: 5 TABLET, FILM COATED ORAL at 08:00

## 2022-01-01 RX ADMIN — ALBUMIN HUMAN 25 G: 0.05 INJECTION, SOLUTION INTRAVENOUS at 21:53

## 2022-01-01 RX ADMIN — Medication 1 PACKET: at 15:39

## 2022-01-01 RX ADMIN — CEFTRIAXONE SODIUM 1 G: 1 INJECTION, POWDER, FOR SOLUTION INTRAMUSCULAR; INTRAVENOUS at 13:15

## 2022-01-01 RX ADMIN — ACETAMINOPHEN 1000 MG: 500 TABLET, FILM COATED ORAL at 08:24

## 2022-01-01 RX ADMIN — ACETAMINOPHEN 650 MG: 325 TABLET ORAL at 00:21

## 2022-01-01 RX ADMIN — MIRTAZAPINE 15 MG: 15 TABLET, FILM COATED ORAL at 21:27

## 2022-01-01 RX ADMIN — METRONIDAZOLE 500 MG: 500 TABLET ORAL at 22:07

## 2022-01-01 RX ADMIN — GABAPENTIN 600 MG: 300 CAPSULE ORAL at 22:06

## 2022-01-01 RX ADMIN — Medication 1 PACKET: at 15:56

## 2022-01-01 RX ADMIN — DIGOXIN 125 MCG: 125 TABLET ORAL at 08:18

## 2022-01-01 RX ADMIN — Medication 1 PACKET: at 18:38

## 2022-01-01 RX ADMIN — Medication 1 PACKET: at 21:57

## 2022-01-01 RX ADMIN — CEFUROXIME AXETIL 500 MG: 500 TABLET ORAL at 09:05

## 2022-01-01 RX ADMIN — APIXABAN 5 MG: 5 TABLET, FILM COATED ORAL at 21:06

## 2022-01-01 RX ADMIN — Medication 1 CAPSULE: at 09:39

## 2022-01-01 RX ADMIN — ACETAMINOPHEN 975 MG: 325 SUSPENSION ORAL at 13:20

## 2022-01-01 RX ADMIN — APIXABAN 5 MG: 5 TABLET, FILM COATED ORAL at 08:14

## 2022-01-01 RX ADMIN — GABAPENTIN 300 MG: 250 SUSPENSION ORAL at 10:25

## 2022-01-01 RX ADMIN — CLOPIDOGREL BISULFATE 75 MG: 75 TABLET ORAL at 09:38

## 2022-01-01 RX ADMIN — Medication 1 PACKET: at 08:03

## 2022-01-01 RX ADMIN — ATORVASTATIN CALCIUM 40 MG: 40 TABLET, FILM COATED ORAL at 20:13

## 2022-01-01 RX ADMIN — MIRTAZAPINE 15 MG: 15 TABLET, FILM COATED ORAL at 21:18

## 2022-01-01 RX ADMIN — Medication 1 TABLET: at 20:33

## 2022-01-01 RX ADMIN — POTASSIUM CHLORIDE 40 MEQ: 750 TABLET, EXTENDED RELEASE ORAL at 11:31

## 2022-01-01 RX ADMIN — POTASSIUM CHLORIDE 40 MEQ: 20 SOLUTION ORAL at 22:08

## 2022-01-01 RX ADMIN — Medication 1 CAPSULE: at 08:46

## 2022-01-01 RX ADMIN — PHENYLEPHRINE HYDROCHLORIDE 200 MCG: 10 INJECTION INTRAVENOUS at 10:39

## 2022-01-01 RX ADMIN — CEFDINIR 300 MG: 125 POWDER, FOR SUSPENSION ORAL at 13:54

## 2022-01-01 RX ADMIN — GABAPENTIN 300 MG: 300 CAPSULE ORAL at 08:19

## 2022-01-01 RX ADMIN — FUROSEMIDE 20 MG: 10 INJECTION, SOLUTION INTRAVENOUS at 14:07

## 2022-01-01 RX ADMIN — Medication 1 TABLET: at 09:52

## 2022-01-01 RX ADMIN — BUDESONIDE 3 MG: 3 CAPSULE ORAL at 08:24

## 2022-01-01 RX ADMIN — METRONIDAZOLE 500 MG: 500 TABLET ORAL at 21:56

## 2022-01-01 RX ADMIN — APIXABAN 5 MG: 5 TABLET, FILM COATED ORAL at 21:28

## 2022-01-01 RX ADMIN — Medication 1 PACKET: at 22:12

## 2022-01-01 RX ADMIN — SODIUM CHLORIDE, POTASSIUM CHLORIDE, SODIUM LACTATE AND CALCIUM CHLORIDE: 600; 310; 30; 20 INJECTION, SOLUTION INTRAVENOUS at 10:11

## 2022-01-01 RX ADMIN — POTASSIUM CHLORIDE 20 MEQ: 1.5 POWDER, FOR SOLUTION ORAL at 08:01

## 2022-01-01 RX ADMIN — ACETAMINOPHEN 650 MG: 325 TABLET ORAL at 21:13

## 2022-01-01 RX ADMIN — SODIUM CHLORIDE 250 ML: 9 INJECTION, SOLUTION INTRAVENOUS at 01:39

## 2022-01-01 RX ADMIN — ACETAMINOPHEN 975 MG: 325 SUSPENSION ORAL at 15:39

## 2022-01-01 RX ADMIN — Medication 1 TABLET: at 08:14

## 2022-01-01 RX ADMIN — APIXABAN 5 MG: 5 TABLET, FILM COATED ORAL at 19:27

## 2022-01-01 RX ADMIN — GABAPENTIN 600 MG: 300 CAPSULE ORAL at 21:13

## 2022-01-01 RX ADMIN — Medication 1 TABLET: at 10:03

## 2022-01-01 RX ADMIN — ACETAMINOPHEN 1000 MG: 500 TABLET, FILM COATED ORAL at 09:24

## 2022-01-01 RX ADMIN — Medication 1 PACKET: at 22:13

## 2022-01-01 RX ADMIN — MIRTAZAPINE 15 MG: 15 TABLET, FILM COATED ORAL at 20:31

## 2022-01-01 RX ADMIN — ATORVASTATIN CALCIUM 40 MG: 40 TABLET, FILM COATED ORAL at 08:14

## 2022-01-01 RX ADMIN — ATORVASTATIN CALCIUM 40 MG: 40 TABLET, FILM COATED ORAL at 21:27

## 2022-01-01 RX ADMIN — APIXABAN 5 MG: 5 TABLET, FILM COATED ORAL at 20:21

## 2022-01-01 RX ADMIN — CEFEPIME HYDROCHLORIDE 1 G: 1 INJECTION, POWDER, FOR SOLUTION INTRAMUSCULAR; INTRAVENOUS at 22:27

## 2022-01-01 RX ADMIN — Medication 1 TABLET: at 21:25

## 2022-01-01 RX ADMIN — ATORVASTATIN CALCIUM 40 MG: 40 TABLET, FILM COATED ORAL at 21:19

## 2022-01-01 RX ADMIN — ESMOLOL HYDROCHLORIDE 20 MG: 10 INJECTION, SOLUTION INTRAVENOUS at 11:26

## 2022-01-01 RX ADMIN — CEFEPIME HYDROCHLORIDE 2 G: 2 INJECTION, POWDER, FOR SOLUTION INTRAVENOUS at 22:35

## 2022-01-01 RX ADMIN — CEFUROXIME AXETIL 500 MG: 500 TABLET ORAL at 20:06

## 2022-01-01 RX ADMIN — SODIUM CHLORIDE, POTASSIUM CHLORIDE, SODIUM LACTATE AND CALCIUM CHLORIDE 500 ML: 600; 310; 30; 20 INJECTION, SOLUTION INTRAVENOUS at 17:39

## 2022-01-01 RX ADMIN — ATORVASTATIN CALCIUM 40 MG: 20 TABLET, FILM COATED ORAL at 21:01

## 2022-01-01 RX ADMIN — LOPERAMIDE HCL 4 MG: 1 SOLUTION ORAL at 09:32

## 2022-01-01 RX ADMIN — Medication 1 TABLET: at 21:48

## 2022-01-01 RX ADMIN — ACETAMINOPHEN 650 MG: 325 SUSPENSION ORAL at 08:38

## 2022-01-01 RX ADMIN — GABAPENTIN 300 MG: 250 SUSPENSION ORAL at 08:33

## 2022-01-01 RX ADMIN — MIRTAZAPINE 15 MG: 15 TABLET, FILM COATED ORAL at 21:08

## 2022-01-01 RX ADMIN — Medication 1 PACKET: at 08:17

## 2022-01-01 RX ADMIN — SODIUM CHLORIDE: 9 INJECTION, SOLUTION INTRAVENOUS at 00:27

## 2022-01-01 RX ADMIN — APIXABAN 5 MG: 5 TABLET, FILM COATED ORAL at 20:02

## 2022-01-01 RX ADMIN — APIXABAN 5 MG: 5 TABLET, FILM COATED ORAL at 08:01

## 2022-01-01 RX ADMIN — SODIUM CHLORIDE 1000 ML: 9 INJECTION, SOLUTION INTRAVENOUS at 17:43

## 2022-01-01 RX ADMIN — CEFTRIAXONE SODIUM 2 G: 2 INJECTION, POWDER, FOR SOLUTION INTRAMUSCULAR; INTRAVENOUS at 14:46

## 2022-01-01 RX ADMIN — DOXYCYCLINE HYCLATE 100 MG: 100 CAPSULE ORAL at 09:05

## 2022-01-01 RX ADMIN — SODIUM CHLORIDE 500 ML: 9 INJECTION, SOLUTION INTRAVENOUS at 20:55

## 2022-01-01 RX ADMIN — GABAPENTIN 600 MG: 250 SOLUTION ORAL at 22:38

## 2022-01-01 RX ADMIN — BUDESONIDE 3 MG: 3 CAPSULE ORAL at 07:47

## 2022-01-01 RX ADMIN — GABAPENTIN 600 MG: 250 SOLUTION ORAL at 21:47

## 2022-01-01 RX ADMIN — METRONIDAZOLE 500 MG: 500 TABLET ORAL at 15:39

## 2022-01-01 RX ADMIN — CLOPIDOGREL BISULFATE 75 MG: 75 TABLET ORAL at 08:15

## 2022-01-01 RX ADMIN — GABAPENTIN 300 MG: 250 SUSPENSION ORAL at 08:09

## 2022-01-01 RX ADMIN — CALCIUM CARBONATE 1500 MG: 1250 SUSPENSION ORAL at 18:09

## 2022-01-01 RX ADMIN — DOXYCYCLINE HYCLATE 100 MG: 100 CAPSULE ORAL at 20:11

## 2022-01-01 RX ADMIN — METRONIDAZOLE 500 MG: 500 INJECTION, SOLUTION INTRAVENOUS at 12:40

## 2022-01-01 RX ADMIN — APIXABAN 5 MG: 5 TABLET, FILM COATED ORAL at 20:36

## 2022-01-01 RX ADMIN — CEFUROXIME AXETIL 500 MG: 250 TABLET ORAL at 08:24

## 2022-01-01 RX ADMIN — ATORVASTATIN CALCIUM 40 MG: 40 TABLET, FILM COATED ORAL at 20:11

## 2022-01-01 RX ADMIN — DIGOXIN 125 MCG: 125 TABLET ORAL at 08:40

## 2022-01-01 RX ADMIN — Medication 1 CAPSULE: at 10:24

## 2022-01-01 RX ADMIN — GABAPENTIN 600 MG: 250 SOLUTION ORAL at 21:57

## 2022-01-01 RX ADMIN — POTASSIUM CHLORIDE 40 MEQ: 20 SOLUTION ORAL at 14:26

## 2022-01-01 RX ADMIN — SODIUM CHLORIDE 1000 ML: 9 INJECTION, SOLUTION INTRAVENOUS at 01:11

## 2022-01-01 RX ADMIN — DIGOXIN 125 MCG: 0.05 SOLUTION ORAL at 11:55

## 2022-01-01 RX ADMIN — APIXABAN 5 MG: 5 TABLET, FILM COATED ORAL at 08:58

## 2022-01-01 RX ADMIN — POTASSIUM CHLORIDE 40 MEQ: 1.5 POWDER, FOR SOLUTION ORAL at 16:04

## 2022-01-01 RX ADMIN — METRONIDAZOLE 500 MG: 500 INJECTION, SOLUTION INTRAVENOUS at 14:28

## 2022-01-01 RX ADMIN — Medication 1 CAPSULE: at 08:42

## 2022-01-01 RX ADMIN — SODIUM CHLORIDE 1000 ML: 9 INJECTION, SOLUTION INTRAVENOUS at 14:11

## 2022-01-01 RX ADMIN — CEFDINIR 300 MG: 125 POWDER, FOR SUSPENSION ORAL at 08:57

## 2022-01-01 RX ADMIN — DOXYCYCLINE HYCLATE 100 MG: 100 CAPSULE ORAL at 21:17

## 2022-01-01 RX ADMIN — POTASSIUM CHLORIDE 20 MEQ: 1.5 POWDER, FOR SOLUTION ORAL at 08:24

## 2022-01-01 RX ADMIN — DOXYCYCLINE HYCLATE 100 MG: 100 CAPSULE ORAL at 19:42

## 2022-01-01 RX ADMIN — SODIUM CHLORIDE, POTASSIUM CHLORIDE, SODIUM LACTATE AND CALCIUM CHLORIDE: 600; 310; 30; 20 INJECTION, SOLUTION INTRAVENOUS at 11:23

## 2022-01-01 RX ADMIN — CLOPIDOGREL BISULFATE 75 MG: 75 TABLET ORAL at 08:05

## 2022-01-01 RX ADMIN — CEFUROXIME AXETIL 500 MG: 250 TABLET ORAL at 22:06

## 2022-01-01 RX ADMIN — DOXYCYCLINE HYCLATE 100 MG: 100 CAPSULE ORAL at 21:02

## 2022-01-01 RX ADMIN — CEFUROXIME AXETIL 500 MG: 250 TABLET ORAL at 21:51

## 2022-01-01 RX ADMIN — GABAPENTIN 600 MG: 250 SOLUTION ORAL at 20:11

## 2022-01-01 RX ADMIN — ACETAMINOPHEN 975 MG: 325 SUSPENSION ORAL at 20:01

## 2022-01-01 RX ADMIN — APIXABAN 5 MG: 5 TABLET, FILM COATED ORAL at 08:29

## 2022-01-01 RX ADMIN — MIRTAZAPINE 15 MG: 15 TABLET, FILM COATED ORAL at 21:00

## 2022-01-01 RX ADMIN — BARIUM SULFATE 40 ML: 400 SUSPENSION ORAL at 11:54

## 2022-01-01 RX ADMIN — MIRTAZAPINE 15 MG: 15 TABLET, FILM COATED ORAL at 00:21

## 2022-01-01 RX ADMIN — CALCIUM CARBONATE 1500 MG: 1250 SUSPENSION ORAL at 11:53

## 2022-01-01 RX ADMIN — APIXABAN 5 MG: 5 TABLET, FILM COATED ORAL at 20:13

## 2022-01-01 RX ADMIN — CLOPIDOGREL BISULFATE 75 MG: 75 TABLET ORAL at 07:55

## 2022-01-01 RX ADMIN — MIRTAZAPINE 15 MG: 15 TABLET, FILM COATED ORAL at 20:43

## 2022-01-01 RX ADMIN — ATORVASTATIN CALCIUM 40 MG: 40 TABLET, FILM COATED ORAL at 08:42

## 2022-01-01 RX ADMIN — CEFTRIAXONE SODIUM 2 G: 2 INJECTION, POWDER, FOR SOLUTION INTRAMUSCULAR; INTRAVENOUS at 15:33

## 2022-01-01 RX ADMIN — SODIUM CHLORIDE 250 ML: 9 INJECTION, SOLUTION INTRAVENOUS at 08:34

## 2022-01-01 RX ADMIN — APIXABAN 5 MG: 5 TABLET, FILM COATED ORAL at 22:40

## 2022-01-01 RX ADMIN — GABAPENTIN 300 MG: 300 CAPSULE ORAL at 22:48

## 2022-01-01 RX ADMIN — ACETAMINOPHEN 650 MG: 325 SUSPENSION ORAL at 14:05

## 2022-01-01 RX ADMIN — Medication 1 CAPSULE: at 08:12

## 2022-01-01 RX ADMIN — GABAPENTIN 600 MG: 250 SOLUTION ORAL at 22:44

## 2022-01-01 RX ADMIN — Medication 1 TABLET: at 08:57

## 2022-01-01 RX ADMIN — ATORVASTATIN CALCIUM 40 MG: 40 TABLET, FILM COATED ORAL at 08:45

## 2022-01-01 RX ADMIN — POTASSIUM CHLORIDE 40 MEQ: 1.5 POWDER, FOR SOLUTION ORAL at 13:54

## 2022-01-01 RX ADMIN — Medication 1 PACKET: at 16:24

## 2022-01-01 RX ADMIN — GABAPENTIN 300 MG: 250 SUSPENSION ORAL at 09:17

## 2022-01-01 RX ADMIN — MIRTAZAPINE 15 MG: 15 TABLET, FILM COATED ORAL at 21:17

## 2022-01-01 RX ADMIN — MIRTAZAPINE 15 MG: 15 TABLET, FILM COATED ORAL at 21:50

## 2022-01-01 RX ADMIN — APIXABAN 5 MG: 5 TABLET, FILM COATED ORAL at 08:12

## 2022-01-01 RX ADMIN — ACETAMINOPHEN 975 MG: 325 SUSPENSION ORAL at 20:13

## 2022-01-01 RX ADMIN — APIXABAN 5 MG: 5 TABLET, FILM COATED ORAL at 21:37

## 2022-01-01 RX ADMIN — Medication 1 CAPSULE: at 09:38

## 2022-01-01 RX ADMIN — GABAPENTIN 600 MG: 300 CAPSULE ORAL at 21:28

## 2022-01-01 RX ADMIN — GABAPENTIN 300 MG: 300 CAPSULE ORAL at 08:36

## 2022-01-01 RX ADMIN — ACETAMINOPHEN 1000 MG: 500 TABLET, FILM COATED ORAL at 21:26

## 2022-01-01 RX ADMIN — CEFUROXIME AXETIL 500 MG: 250 TABLET ORAL at 09:24

## 2022-01-01 RX ADMIN — GABAPENTIN 300 MG: 250 SOLUTION ORAL at 09:57

## 2022-01-01 RX ADMIN — ACETAMINOPHEN 1000 MG: 500 TABLET ORAL at 21:18

## 2022-01-01 RX ADMIN — PHENYLEPHRINE HYDROCHLORIDE 0.5 MCG/KG/MIN: 10 INJECTION INTRAVENOUS at 10:18

## 2022-01-01 RX ADMIN — BUDESONIDE 3 MG: 3 CAPSULE ORAL at 21:39

## 2022-01-01 RX ADMIN — Medication 1 CAPSULE: at 09:15

## 2022-01-01 RX ADMIN — BUDESONIDE 3 MG: 3 CAPSULE ORAL at 08:57

## 2022-01-01 RX ADMIN — APIXABAN 5 MG: 5 TABLET, FILM COATED ORAL at 07:55

## 2022-01-01 RX ADMIN — Medication 1 PACKET: at 09:41

## 2022-01-01 RX ADMIN — Medication 1 PACKET: at 21:27

## 2022-01-01 RX ADMIN — GABAPENTIN 600 MG: 300 CAPSULE ORAL at 00:26

## 2022-01-01 RX ADMIN — ACETAMINOPHEN 1000 MG: 500 TABLET ORAL at 08:29

## 2022-01-01 RX ADMIN — Medication 1 PACKET: at 21:24

## 2022-01-01 RX ADMIN — POTASSIUM CHLORIDE AND SODIUM CHLORIDE: 900; 150 INJECTION, SOLUTION INTRAVENOUS at 22:45

## 2022-01-01 RX ADMIN — MIRTAZAPINE 15 MG: 15 TABLET, FILM COATED ORAL at 21:21

## 2022-01-01 RX ADMIN — Medication 1 TABLET: at 20:32

## 2022-01-01 RX ADMIN — LOPERAMIDE HCL 4 MG: 1 SOLUTION ORAL at 20:50

## 2022-01-01 RX ADMIN — CEFTRIAXONE SODIUM 2 G: 2 INJECTION, POWDER, FOR SOLUTION INTRAMUSCULAR; INTRAVENOUS at 04:44

## 2022-01-01 RX ADMIN — MIRTAZAPINE 15 MG: 15 TABLET, FILM COATED ORAL at 19:59

## 2022-01-01 RX ADMIN — CALCIUM CARBONATE 1500 MG: 1250 SUSPENSION ORAL at 17:21

## 2022-01-01 RX ADMIN — BUDESONIDE 3 MG: 3 CAPSULE ORAL at 08:12

## 2022-01-01 RX ADMIN — ATORVASTATIN CALCIUM 40 MG: 40 TABLET, FILM COATED ORAL at 21:03

## 2022-01-01 RX ADMIN — APIXABAN 5 MG: 5 TABLET, FILM COATED ORAL at 20:44

## 2022-01-01 RX ADMIN — CEFTRIAXONE SODIUM 2 G: 2 INJECTION, POWDER, FOR SOLUTION INTRAMUSCULAR; INTRAVENOUS at 05:31

## 2022-01-01 RX ADMIN — Medication 1 TABLET: at 21:22

## 2022-01-01 RX ADMIN — Medication 1 TABLET: at 09:24

## 2022-01-01 RX ADMIN — GABAPENTIN 600 MG: 300 CAPSULE ORAL at 21:00

## 2022-01-01 RX ADMIN — GABAPENTIN 600 MG: 250 SOLUTION ORAL at 21:24

## 2022-01-01 RX ADMIN — BUDESONIDE 6 MG: 3 CAPSULE ORAL at 14:31

## 2022-01-01 RX ADMIN — ACETAMINOPHEN 1000 MG: 500 TABLET, FILM COATED ORAL at 20:27

## 2022-01-01 RX ADMIN — POTASSIUM CHLORIDE 40 MEQ: 750 TABLET, EXTENDED RELEASE ORAL at 08:17

## 2022-01-01 RX ADMIN — POTASSIUM CHLORIDE 10 MEQ: 7.46 INJECTION, SOLUTION INTRAVENOUS at 09:07

## 2022-01-01 RX ADMIN — APIXABAN 5 MG: 5 TABLET, FILM COATED ORAL at 21:29

## 2022-01-01 RX ADMIN — CEFDINIR 300 MG: 125 POWDER, FOR SUSPENSION ORAL at 21:24

## 2022-01-01 RX ADMIN — BUDESONIDE 3 MG: 3 CAPSULE ORAL at 08:45

## 2022-01-01 RX ADMIN — APIXABAN 5 MG: 5 TABLET, FILM COATED ORAL at 20:11

## 2022-01-01 RX ADMIN — MIRTAZAPINE 15 MG: 15 TABLET, FILM COATED ORAL at 02:54

## 2022-01-01 RX ADMIN — GABAPENTIN 600 MG: 250 SOLUTION ORAL at 20:13

## 2022-01-01 RX ADMIN — DIGOXIN 125 MCG: 125 TABLET ORAL at 10:00

## 2022-01-01 RX ADMIN — Medication 1 CAPSULE: at 10:56

## 2022-01-01 RX ADMIN — CEFTRIAXONE SODIUM 1 G: 1 INJECTION, POWDER, FOR SOLUTION INTRAMUSCULAR; INTRAVENOUS at 13:05

## 2022-01-01 RX ADMIN — CLOPIDOGREL BISULFATE 75 MG: 75 TABLET ORAL at 08:34

## 2022-01-01 RX ADMIN — MIDAZOLAM 1 MG: 1 INJECTION INTRAMUSCULAR; INTRAVENOUS at 11:24

## 2022-01-01 RX ADMIN — FENTANYL CITRATE 50 MCG: 50 INJECTION, SOLUTION INTRAMUSCULAR; INTRAVENOUS at 11:06

## 2022-01-01 RX ADMIN — ACETAMINOPHEN 1000 MG: 500 TABLET, FILM COATED ORAL at 20:51

## 2022-01-01 RX ADMIN — Medication 1 PACKET: at 10:56

## 2022-01-01 RX ADMIN — MIDAZOLAM HYDROCHLORIDE 0.5 MG: 1 INJECTION, SOLUTION INTRAMUSCULAR; INTRAVENOUS at 08:23

## 2022-01-01 RX ADMIN — CEFTRIAXONE SODIUM 2 G: 2 INJECTION, POWDER, FOR SOLUTION INTRAMUSCULAR; INTRAVENOUS at 15:49

## 2022-01-01 RX ADMIN — Medication 1 PACKET: at 08:20

## 2022-01-01 RX ADMIN — ACETAMINOPHEN 650 MG: 325 SUSPENSION ORAL at 21:13

## 2022-01-01 RX ADMIN — Medication 1 PACKET: at 15:35

## 2022-01-01 RX ADMIN — ATORVASTATIN CALCIUM 40 MG: 20 TABLET, FILM COATED ORAL at 00:21

## 2022-01-01 RX ADMIN — Medication 1 TABLET: at 12:27

## 2022-01-01 RX ADMIN — ACETAMINOPHEN 975 MG: 325 SUSPENSION ORAL at 13:24

## 2022-01-01 RX ADMIN — GABAPENTIN 600 MG: 250 SOLUTION ORAL at 21:49

## 2022-01-01 RX ADMIN — CEFUROXIME AXETIL 500 MG: 500 TABLET ORAL at 08:03

## 2022-01-01 RX ADMIN — CLOPIDOGREL BISULFATE 75 MG: 75 TABLET ORAL at 08:13

## 2022-01-01 RX ADMIN — PHENYLEPHRINE HYDROCHLORIDE 200 MCG: 10 INJECTION INTRAVENOUS at 11:09

## 2022-01-01 RX ADMIN — METRONIDAZOLE 500 MG: 500 TABLET ORAL at 15:07

## 2022-01-01 RX ADMIN — DEXMEDETOMIDINE 8 MCG: 100 INJECTION, SOLUTION, CONCENTRATE INTRAVENOUS at 11:27

## 2022-01-01 RX ADMIN — Medication 1 TABLET: at 22:07

## 2022-01-01 RX ADMIN — APIXABAN 5 MG: 5 TABLET, FILM COATED ORAL at 20:09

## 2022-01-01 RX ADMIN — DOXYCYCLINE HYCLATE 100 MG: 100 CAPSULE ORAL at 08:03

## 2022-01-01 RX ADMIN — MIRTAZAPINE 15 MG: 15 TABLET, FILM COATED ORAL at 21:55

## 2022-01-01 RX ADMIN — METRONIDAZOLE 500 MG: 500 INJECTION, SOLUTION INTRAVENOUS at 13:06

## 2022-01-01 RX ADMIN — APIXABAN 5 MG: 5 TABLET, FILM COATED ORAL at 20:28

## 2022-01-01 RX ADMIN — APIXABAN 5 MG: 5 TABLET, FILM COATED ORAL at 10:03

## 2022-01-01 RX ADMIN — POTASSIUM & SODIUM PHOSPHATES POWDER PACK 280-160-250 MG 1 PACKET: 280-160-250 PACK at 15:04

## 2022-01-01 RX ADMIN — DIGOXIN 125 MCG: 125 TABLET ORAL at 08:00

## 2022-01-01 RX ADMIN — MIRTAZAPINE 15 MG: 15 TABLET, FILM COATED ORAL at 20:11

## 2022-01-01 RX ADMIN — Medication 5 MG: at 11:40

## 2022-01-01 RX ADMIN — CLOPIDOGREL BISULFATE 75 MG: 75 TABLET ORAL at 08:25

## 2022-01-01 RX ADMIN — CEFTRIAXONE SODIUM 2 G: 2 INJECTION, POWDER, FOR SOLUTION INTRAMUSCULAR; INTRAVENOUS at 05:36

## 2022-01-01 RX ADMIN — Medication 1 TABLET: at 08:40

## 2022-01-01 RX ADMIN — MIRTAZAPINE 15 MG: 15 TABLET, FILM COATED ORAL at 05:05

## 2022-01-01 RX ADMIN — ACETAMINOPHEN 1000 MG: 500 TABLET, FILM COATED ORAL at 15:57

## 2022-01-01 RX ADMIN — GABAPENTIN 600 MG: 300 CAPSULE ORAL at 21:03

## 2022-01-01 RX ADMIN — GABAPENTIN 300 MG: 250 SUSPENSION ORAL at 11:33

## 2022-01-01 RX ADMIN — Medication 1 TABLET: at 10:23

## 2022-01-01 RX ADMIN — Medication 1 TABLET: at 21:56

## 2022-01-01 RX ADMIN — ACETAMINOPHEN 975 MG: 325 SUSPENSION ORAL at 12:32

## 2022-01-01 RX ADMIN — APIXABAN 5 MG: 5 TABLET, FILM COATED ORAL at 08:32

## 2022-01-01 RX ADMIN — CEFDINIR 300 MG: 125 POWDER, FOR SUSPENSION ORAL at 20:36

## 2022-01-01 RX ADMIN — POTASSIUM CHLORIDE 20 MEQ: 1.5 POWDER, FOR SOLUTION ORAL at 09:48

## 2022-01-01 RX ADMIN — ACETAMINOPHEN 650 MG: 325 SUSPENSION ORAL at 08:34

## 2022-01-01 RX ADMIN — ACETAMINOPHEN 1000 MG: 500 TABLET, FILM COATED ORAL at 15:19

## 2022-01-01 RX ADMIN — ATORVASTATIN CALCIUM 40 MG: 20 TABLET, FILM COATED ORAL at 22:40

## 2022-01-01 RX ADMIN — DIGOXIN 125 MCG: 125 TABLET ORAL at 08:58

## 2022-01-01 RX ADMIN — ATORVASTATIN CALCIUM 40 MG: 20 TABLET, FILM COATED ORAL at 19:42

## 2022-01-01 RX ADMIN — ACETAMINOPHEN 1000 MG: 500 TABLET, FILM COATED ORAL at 09:39

## 2022-01-01 RX ADMIN — HYDROMORPHONE HYDROCHLORIDE 0.2 MG: 0.2 INJECTION, SOLUTION INTRAMUSCULAR; INTRAVENOUS; SUBCUTANEOUS at 18:13

## 2022-01-01 RX ADMIN — MINERAL SUPPLEMENT IRON 300 MG / 5 ML STRENGTH LIQUID 100 PER BOX UNFLAVORED 220 MG: at 08:54

## 2022-01-01 RX ADMIN — CLOPIDOGREL BISULFATE 75 MG: 75 TABLET ORAL at 08:32

## 2022-01-01 RX ADMIN — DIGOXIN 125 MCG: 125 TABLET ORAL at 10:26

## 2022-01-01 RX ADMIN — POTASSIUM CHLORIDE 40 MEQ: 1.5 POWDER, FOR SOLUTION ORAL at 16:20

## 2022-01-01 RX ADMIN — CLOPIDOGREL BISULFATE 75 MG: 75 TABLET ORAL at 07:47

## 2022-01-01 RX ADMIN — GABAPENTIN 600 MG: 300 CAPSULE ORAL at 21:56

## 2022-01-01 RX ADMIN — CEFUROXIME AXETIL 500 MG: 500 TABLET ORAL at 20:10

## 2022-01-01 RX ADMIN — APIXABAN 5 MG: 5 TABLET, FILM COATED ORAL at 08:13

## 2022-01-01 RX ADMIN — POTASSIUM CHLORIDE 40 MEQ: 20 SOLUTION ORAL at 19:32

## 2022-01-01 RX ADMIN — CLINDAMYCIN PALMITATE HYDROCHLORIDE 450 MG: 75 SOLUTION ORAL at 09:12

## 2022-01-01 RX ADMIN — DIGOXIN 125 MCG: 125 TABLET ORAL at 09:26

## 2022-01-01 RX ADMIN — APIXABAN 5 MG: 5 TABLET, FILM COATED ORAL at 21:21

## 2022-01-01 RX ADMIN — PHENYLEPHRINE HYDROCHLORIDE 100 MCG: 10 INJECTION INTRAVENOUS at 11:45

## 2022-01-01 RX ADMIN — DOXYCYCLINE HYCLATE 100 MG: 100 CAPSULE ORAL at 23:23

## 2022-01-01 RX ADMIN — Medication 1 PACKET: at 15:30

## 2022-01-01 RX ADMIN — CALCIUM CARBONATE 1500 MG: 1250 SUSPENSION ORAL at 17:38

## 2022-01-01 RX ADMIN — GABAPENTIN 300 MG: 250 SUSPENSION ORAL at 09:29

## 2022-01-01 RX ADMIN — GABAPENTIN 300 MG: 250 SOLUTION ORAL at 08:28

## 2022-01-01 RX ADMIN — DIGOXIN 125 MCG: 125 TABLET ORAL at 09:38

## 2022-01-01 RX ADMIN — POTASSIUM CHLORIDE 20 MEQ: 20 SOLUTION ORAL at 16:05

## 2022-01-01 RX ADMIN — APIXABAN 5 MG: 5 TABLET, FILM COATED ORAL at 20:25

## 2022-01-01 RX ADMIN — METRONIDAZOLE 500 MG: 500 INJECTION, SOLUTION INTRAVENOUS at 01:23

## 2022-01-01 RX ADMIN — DIGOXIN 125 MCG: 125 TABLET ORAL at 08:34

## 2022-01-01 RX ADMIN — CEFTRIAXONE SODIUM 2 G: 2 INJECTION, POWDER, FOR SOLUTION INTRAMUSCULAR; INTRAVENOUS at 14:37

## 2022-01-01 RX ADMIN — Medication 1 TABLET: at 21:26

## 2022-01-01 RX ADMIN — APIXABAN 5 MG: 5 TABLET, FILM COATED ORAL at 08:09

## 2022-01-01 RX ADMIN — METRONIDAZOLE 500 MG: 500 TABLET ORAL at 09:25

## 2022-01-01 RX ADMIN — APIXABAN 5 MG: 5 TABLET, FILM COATED ORAL at 09:47

## 2022-01-01 RX ADMIN — Medication 1 CAPSULE: at 08:17

## 2022-01-01 RX ADMIN — APIXABAN 5 MG: 5 TABLET, FILM COATED ORAL at 09:29

## 2022-01-01 RX ADMIN — BARIUM SULFATE 10 ML: 400 SUSPENSION ORAL at 12:04

## 2022-01-01 RX ADMIN — CEFUROXIME AXETIL 500 MG: 500 TABLET ORAL at 21:01

## 2022-01-01 RX ADMIN — BUDESONIDE 3 MG: 3 CAPSULE ORAL at 20:30

## 2022-01-01 RX ADMIN — METRONIDAZOLE 500 MG: 500 TABLET ORAL at 22:12

## 2022-01-01 RX ADMIN — MIRTAZAPINE 15 MG: 15 TABLET, FILM COATED ORAL at 21:03

## 2022-01-01 RX ADMIN — ACETAMINOPHEN 650 MG: 325 TABLET ORAL at 19:06

## 2022-01-01 RX ADMIN — GABAPENTIN 300 MG: 250 SUSPENSION ORAL at 12:47

## 2022-01-01 RX ADMIN — CEFTRIAXONE SODIUM 2 G: 2 INJECTION, POWDER, FOR SOLUTION INTRAMUSCULAR; INTRAVENOUS at 05:46

## 2022-01-01 RX ADMIN — MIRTAZAPINE 15 MG: 15 TABLET, FILM COATED ORAL at 22:07

## 2022-01-01 RX ADMIN — POTASSIUM CHLORIDE 10 MEQ: 7.46 INJECTION, SOLUTION INTRAVENOUS at 16:40

## 2022-01-01 RX ADMIN — DIGOXIN 125 MCG: 125 TABLET ORAL at 08:25

## 2022-01-01 RX ADMIN — Medication 1 PACKET: at 21:25

## 2022-01-01 RX ADMIN — POTASSIUM CHLORIDE 10 MEQ: 7.46 INJECTION, SOLUTION INTRAVENOUS at 19:04

## 2022-01-01 RX ADMIN — ATORVASTATIN CALCIUM 40 MG: 20 TABLET, FILM COATED ORAL at 21:37

## 2022-01-01 RX ADMIN — ACETAMINOPHEN 1000 MG: 500 TABLET, FILM COATED ORAL at 09:14

## 2022-01-01 RX ADMIN — DIGOXIN 125 MCG: 125 TABLET ORAL at 08:24

## 2022-01-01 RX ADMIN — ACETAMINOPHEN 975 MG: 325 SUSPENSION ORAL at 13:54

## 2022-01-01 RX ADMIN — HUMAN ALBUMIN MICROSPHERES AND PERFLUTREN 9 ML: 10; .22 INJECTION, SOLUTION INTRAVENOUS at 13:27

## 2022-01-01 RX ADMIN — CLOPIDOGREL BISULFATE 75 MG: 75 TABLET ORAL at 08:17

## 2022-01-01 RX ADMIN — MULTIVITAMIN 15 ML: LIQUID ORAL at 08:09

## 2022-01-01 RX ADMIN — GABAPENTIN 300 MG: 250 SUSPENSION ORAL at 07:48

## 2022-01-01 RX ADMIN — SODIUM CHLORIDE 250 MG: 9 INJECTION, SOLUTION INTRAVENOUS at 00:14

## 2022-01-01 RX ADMIN — GABAPENTIN 300 MG: 300 CAPSULE ORAL at 08:25

## 2022-01-01 RX ADMIN — CEFEPIME HYDROCHLORIDE 1 G: 1 INJECTION, POWDER, FOR SOLUTION INTRAMUSCULAR; INTRAVENOUS at 08:25

## 2022-01-01 RX ADMIN — MIRTAZAPINE 15 MG: 15 TABLET, FILM COATED ORAL at 20:13

## 2022-01-01 RX ADMIN — CLOPIDOGREL BISULFATE 75 MG: 75 TABLET ORAL at 08:46

## 2022-01-01 RX ADMIN — LOPERAMIDE HCL 4 MG: 1 SOLUTION ORAL at 09:02

## 2022-01-01 RX ADMIN — APIXABAN 5 MG: 5 TABLET, FILM COATED ORAL at 08:40

## 2022-01-01 RX ADMIN — ACETAMINOPHEN 1000 MG: 500 TABLET, FILM COATED ORAL at 21:57

## 2022-01-01 RX ADMIN — Medication 1 PACKET: at 16:19

## 2022-01-01 RX ADMIN — APIXABAN 5 MG: 5 TABLET, FILM COATED ORAL at 22:26

## 2022-01-01 RX ADMIN — GABAPENTIN 300 MG: 300 CAPSULE ORAL at 21:52

## 2022-01-01 RX ADMIN — MIRTAZAPINE 15 MG: 15 TABLET, FILM COATED ORAL at 21:57

## 2022-01-01 RX ADMIN — DIGOXIN 125 MCG: 125 TABLET ORAL at 08:15

## 2022-01-01 RX ADMIN — BUDESONIDE 3 MG: 3 CAPSULE ORAL at 21:29

## 2022-01-01 RX ADMIN — GABAPENTIN 300 MG: 250 SUSPENSION ORAL at 09:10

## 2022-01-01 RX ADMIN — ATORVASTATIN CALCIUM 40 MG: 20 TABLET, FILM COATED ORAL at 21:18

## 2022-01-01 RX ADMIN — SODIUM PHOSPHATE, MONOBASIC, MONOHYDRATE 9 MMOL: 276; 142 INJECTION, SOLUTION INTRAVENOUS at 15:38

## 2022-01-01 RX ADMIN — BUDESONIDE 3 MG: 3 CAPSULE ORAL at 08:08

## 2022-01-01 RX ADMIN — CEFUROXIME AXETIL 500 MG: 250 TABLET ORAL at 09:37

## 2022-01-01 RX ADMIN — GABAPENTIN 300 MG: 250 SOLUTION ORAL at 08:33

## 2022-01-01 RX ADMIN — GABAPENTIN 300 MG: 300 CAPSULE ORAL at 10:03

## 2022-01-01 RX ADMIN — APIXABAN 5 MG: 5 TABLET, FILM COATED ORAL at 09:53

## 2022-01-01 RX ADMIN — Medication 1 TABLET: at 08:59

## 2022-01-01 RX ADMIN — ACETAMINOPHEN 975 MG: 325 SUSPENSION ORAL at 08:27

## 2022-01-01 RX ADMIN — CEFUROXIME AXETIL 500 MG: 250 TABLET ORAL at 07:55

## 2022-01-01 RX ADMIN — BUDESONIDE 3 MG: 3 CAPSULE ORAL at 09:48

## 2022-01-01 RX ADMIN — ATORVASTATIN CALCIUM 40 MG: 40 TABLET, FILM COATED ORAL at 21:29

## 2022-01-01 RX ADMIN — CLOPIDOGREL BISULFATE 75 MG: 75 TABLET ORAL at 09:29

## 2022-01-01 RX ADMIN — Medication 1 CAPSULE: at 09:52

## 2022-01-01 RX ADMIN — GABAPENTIN 300 MG: 300 CAPSULE ORAL at 21:09

## 2022-01-01 RX ADMIN — ACETAMINOPHEN 1000 MG: 500 TABLET, FILM COATED ORAL at 21:28

## 2022-01-01 RX ADMIN — ACETAMINOPHEN 1000 MG: 500 TABLET, FILM COATED ORAL at 08:46

## 2022-01-01 RX ADMIN — Medication 1 PACKET: at 16:55

## 2022-01-01 RX ADMIN — ATORVASTATIN CALCIUM 40 MG: 40 TABLET, FILM COATED ORAL at 21:18

## 2022-01-01 RX ADMIN — CEFTRIAXONE SODIUM 2 G: 2 INJECTION, POWDER, FOR SOLUTION INTRAMUSCULAR; INTRAVENOUS at 15:12

## 2022-01-01 RX ADMIN — CEFTRIAXONE SODIUM 2 G: 2 INJECTION, POWDER, FOR SOLUTION INTRAMUSCULAR; INTRAVENOUS at 21:27

## 2022-01-01 RX ADMIN — METRONIDAZOLE 500 MG: 500 TABLET ORAL at 16:25

## 2022-01-01 RX ADMIN — DOXYCYCLINE HYCLATE 100 MG: 100 CAPSULE ORAL at 10:26

## 2022-01-01 RX ADMIN — METRONIDAZOLE 500 MG: 500 TABLET ORAL at 21:16

## 2022-01-01 RX ADMIN — ACETAMINOPHEN 1000 MG: 500 TABLET, FILM COATED ORAL at 20:31

## 2022-01-01 RX ADMIN — APIXABAN 5 MG: 5 TABLET, FILM COATED ORAL at 19:59

## 2022-01-01 RX ADMIN — ATORVASTATIN CALCIUM 40 MG: 40 TABLET, FILM COATED ORAL at 08:58

## 2022-01-01 RX ADMIN — Medication 1 CAPSULE: at 08:58

## 2022-01-01 RX ADMIN — POTASSIUM CHLORIDE 40 MEQ: 1.5 POWDER, FOR SOLUTION ORAL at 22:08

## 2022-01-01 RX ADMIN — MINERAL SUPPLEMENT IRON 300 MG / 5 ML STRENGTH LIQUID 100 PER BOX UNFLAVORED 220 MG: at 09:02

## 2022-01-01 RX ADMIN — BUDESONIDE 3 MG: 3 CAPSULE ORAL at 08:14

## 2022-01-01 RX ADMIN — APIXABAN 5 MG: 5 TABLET, FILM COATED ORAL at 09:26

## 2022-01-01 RX ADMIN — ATORVASTATIN CALCIUM 40 MG: 40 TABLET, FILM COATED ORAL at 21:00

## 2022-01-01 RX ADMIN — GABAPENTIN 600 MG: 300 CAPSULE ORAL at 20:28

## 2022-01-01 RX ADMIN — GABAPENTIN 300 MG: 300 CAPSULE ORAL at 08:46

## 2022-01-01 RX ADMIN — MIRTAZAPINE 15 MG: 15 TABLET, FILM COATED ORAL at 21:29

## 2022-01-01 RX ADMIN — ACETAMINOPHEN 975 MG: 325 SUSPENSION ORAL at 13:45

## 2022-01-01 RX ADMIN — BUDESONIDE 3 MG: 3 CAPSULE ORAL at 11:51

## 2022-01-01 RX ADMIN — CLOPIDOGREL BISULFATE 75 MG: 75 TABLET ORAL at 08:29

## 2022-01-01 RX ADMIN — APIXABAN 5 MG: 5 TABLET, FILM COATED ORAL at 20:52

## 2022-01-01 RX ADMIN — GABAPENTIN 600 MG: 250 SOLUTION ORAL at 20:44

## 2022-01-01 RX ADMIN — Medication 1 TABLET: at 21:28

## 2022-01-01 RX ADMIN — DOXYCYCLINE HYCLATE 100 MG: 100 CAPSULE ORAL at 19:56

## 2022-01-01 RX ADMIN — LOPERAMIDE HYDROCHLORIDE 2 MG: 2 CAPSULE ORAL at 21:13

## 2022-01-01 RX ADMIN — DIGOXIN 125 MCG: 0.05 SOLUTION ORAL at 08:09

## 2022-01-01 RX ADMIN — ROCURONIUM BROMIDE 30 MG: 50 INJECTION, SOLUTION INTRAVENOUS at 10:21

## 2022-01-01 RX ADMIN — METRONIDAZOLE 500 MG: 500 TABLET ORAL at 09:20

## 2022-01-01 RX ADMIN — DOXYCYCLINE HYCLATE 100 MG: 100 CAPSULE ORAL at 19:32

## 2022-01-01 RX ADMIN — DIGOXIN 125 MCG: 125 TABLET ORAL at 07:55

## 2022-01-01 RX ADMIN — ATORVASTATIN CALCIUM 40 MG: 20 TABLET, FILM COATED ORAL at 20:06

## 2022-01-01 RX ADMIN — FENTANYL CITRATE 50 MCG: 50 INJECTION, SOLUTION INTRAMUSCULAR; INTRAVENOUS at 10:21

## 2022-01-01 ASSESSMENT — ACTIVITIES OF DAILY LIVING (ADL)
ADLS_ACUITY_SCORE: 53
ADLS_ACUITY_SCORE: 13
ADLS_ACUITY_SCORE: 11
ADLS_ACUITY_SCORE: 38
ADLS_ACUITY_SCORE: 59
ADLS_ACUITY_SCORE: 35
ADLS_ACUITY_SCORE: 40
ADLS_ACUITY_SCORE: 55
ADLS_ACUITY_SCORE: 15
ADLS_ACUITY_SCORE: 49
ADLS_ACUITY_SCORE: 41
ADLS_ACUITY_SCORE: 55
ADLS_ACUITY_SCORE: 57
ADLS_ACUITY_SCORE: 11
ADLS_ACUITY_SCORE: 13
ADLS_ACUITY_SCORE: 42
ADLS_ACUITY_SCORE: 51
ADLS_ACUITY_SCORE: 15
ADLS_ACUITY_SCORE: 40
ADLS_ACUITY_SCORE: 55
ADLS_ACUITY_SCORE: 13
ADLS_ACUITY_SCORE: 40
ADLS_ACUITY_SCORE: 11
ADLS_ACUITY_SCORE: 55
ADLS_ACUITY_SCORE: 51
ADLS_ACUITY_SCORE: 13
ADLS_ACUITY_SCORE: 42
ADLS_ACUITY_SCORE: 46
ADLS_ACUITY_SCORE: 42
ADLS_ACUITY_SCORE: 51
ADLS_ACUITY_SCORE: 13
SWALLOWING: 2-->DIFFICULTY SWALLOWING FOODS
ADLS_ACUITY_SCORE: 41
ADLS_ACUITY_SCORE: 41
ADLS_ACUITY_SCORE: 13
ADLS_ACUITY_SCORE: 40
ADLS_ACUITY_SCORE: 13
ADLS_ACUITY_SCORE: 13
WEAR_GLASSES_OR_BLIND: YES
ADLS_ACUITY_SCORE: 51
ADLS_ACUITY_SCORE: 40
ADLS_ACUITY_SCORE: 49
ADLS_ACUITY_SCORE: 13
ADLS_ACUITY_SCORE: 38
EATING: 0-->INDEPENDENT
ADLS_ACUITY_SCORE: 41
ADLS_ACUITY_SCORE: 46
NUMBER_OF_TIMES_PATIENT_HAS_FALLEN_WITHIN_LAST_SIX_MONTHS: 4
ADLS_ACUITY_SCORE: 53
ADLS_ACUITY_SCORE: 13
ADLS_ACUITY_SCORE: 46
ADLS_ACUITY_SCORE: 11
ADLS_ACUITY_SCORE: 42
ADLS_ACUITY_SCORE: 51
ADLS_ACUITY_SCORE: 37
ADLS_ACUITY_SCORE: 42
ADLS_ACUITY_SCORE: 13
ADLS_ACUITY_SCORE: 13
ADLS_ACUITY_SCORE: 15
ADLS_ACUITY_SCORE: 53
ADLS_ACUITY_SCORE: 42
ADLS_ACUITY_SCORE: 53
ADLS_ACUITY_SCORE: 52
ADLS_ACUITY_SCORE: 41
ADLS_ACUITY_SCORE: 42
ADLS_ACUITY_SCORE: 38
ADLS_ACUITY_SCORE: 53
ADLS_ACUITY_SCORE: 51
ADLS_ACUITY_SCORE: 13
ADLS_ACUITY_SCORE: 53
ADLS_ACUITY_SCORE: 13
ADLS_ACUITY_SCORE: 53
ADLS_ACUITY_SCORE: 42
ADLS_ACUITY_SCORE: 13
VISION_MANAGEMENT: GLASSES
ADLS_ACUITY_SCORE: 54
ADLS_ACUITY_SCORE: 11
ADLS_ACUITY_SCORE: 53
ADLS_ACUITY_SCORE: 59
ADLS_ACUITY_SCORE: 13
ADLS_ACUITY_SCORE: 42
ADLS_ACUITY_SCORE: 46
DOING_ERRANDS_INDEPENDENTLY_DIFFICULTY: YES
ADLS_ACUITY_SCORE: 35
ADLS_ACUITY_SCORE: 40
WEAR_GLASSES_OR_BLIND: YES
ADLS_ACUITY_SCORE: 13
EQUIPMENT_CURRENTLY_USED_AT_HOME: WALKER, ROLLING;SHOWER CHAIR
TRANSFERRING: 1-->ASSISTANCE (EQUIPMENT/PERSON) NEEDED
ADLS_ACUITY_SCORE: 55
ADLS_ACUITY_SCORE: 47
ADLS_ACUITY_SCORE: 37
ADLS_ACUITY_SCORE: 13
ADLS_ACUITY_SCORE: 42
ADLS_ACUITY_SCORE: 55
ADLS_ACUITY_SCORE: 53
ADLS_ACUITY_SCORE: 51
ADLS_ACUITY_SCORE: 49
ADLS_ACUITY_SCORE: 15
ADLS_ACUITY_SCORE: 13
ADLS_ACUITY_SCORE: 55
ADLS_ACUITY_SCORE: 51
ADLS_ACUITY_SCORE: 51
ADLS_ACUITY_SCORE: 49
TOILETING_ISSUES: YES
ADLS_ACUITY_SCORE: 13
ADLS_ACUITY_SCORE: 51
ADLS_ACUITY_SCORE: 49
ADLS_ACUITY_SCORE: 59
ADLS_ACUITY_SCORE: 13
ADLS_ACUITY_SCORE: 51
ADLS_ACUITY_SCORE: 49
ADLS_ACUITY_SCORE: 13
ADLS_ACUITY_SCORE: 45
ADLS_ACUITY_SCORE: 15
ADLS_ACUITY_SCORE: 52
ADLS_ACUITY_SCORE: 35
ADLS_ACUITY_SCORE: 11
ADLS_ACUITY_SCORE: 55
ADLS_ACUITY_SCORE: 50
ADLS_ACUITY_SCORE: 46
ADLS_ACUITY_SCORE: 13
ADLS_ACUITY_SCORE: 13
ADLS_ACUITY_SCORE: 45
ADLS_ACUITY_SCORE: 59
ADLS_ACUITY_SCORE: 59
ADLS_ACUITY_SCORE: 49
ADLS_ACUITY_SCORE: 45
ADLS_ACUITY_SCORE: 55
ADLS_ACUITY_SCORE: 13
ADLS_ACUITY_SCORE: 35
ADLS_ACUITY_SCORE: 46
ADLS_ACUITY_SCORE: 55
ADLS_ACUITY_SCORE: 52
ADLS_ACUITY_SCORE: 51
ADLS_ACUITY_SCORE: 13
ADLS_ACUITY_SCORE: 51
DIFFICULTY_EATING/SWALLOWING: YES
ADLS_ACUITY_SCORE: 11
ADLS_ACUITY_SCORE: 13
ADLS_ACUITY_SCORE: 13
ADLS_ACUITY_SCORE: 35
ADLS_ACUITY_SCORE: 41
ADLS_ACUITY_SCORE: 55
ADLS_ACUITY_SCORE: 36
ADLS_ACUITY_SCORE: 13
ADLS_ACUITY_SCORE: 46
ADLS_ACUITY_SCORE: 35
ADLS_ACUITY_SCORE: 44
ADLS_ACUITY_SCORE: 13
ADLS_ACUITY_SCORE: 13
ADLS_ACUITY_SCORE: 11
ADLS_ACUITY_SCORE: 38
ADLS_ACUITY_SCORE: 40
ADLS_ACUITY_SCORE: 55
ADLS_ACUITY_SCORE: 15
ADLS_ACUITY_SCORE: 38
ADLS_ACUITY_SCORE: 46
ADLS_ACUITY_SCORE: 13
ADLS_ACUITY_SCORE: 46
ADLS_ACUITY_SCORE: 13
ADLS_ACUITY_SCORE: 53
ADLS_ACUITY_SCORE: 55
ADLS_ACUITY_SCORE: 13
ADLS_ACUITY_SCORE: 15
ADLS_ACUITY_SCORE: 38
ADLS_ACUITY_SCORE: 52
ADLS_ACUITY_SCORE: 59
ADLS_ACUITY_SCORE: 41
CONCENTRATING,_REMEMBERING_OR_MAKING_DECISIONS_DIFFICULTY: YES
ADLS_ACUITY_SCORE: 53
ADLS_ACUITY_SCORE: 49
ADLS_ACUITY_SCORE: 35
ADLS_ACUITY_SCORE: 13
ADLS_ACUITY_SCORE: 39
ADLS_ACUITY_SCORE: 55
ADLS_ACUITY_SCORE: 11
ADLS_ACUITY_SCORE: 49
ADLS_ACUITY_SCORE: 59
ADLS_ACUITY_SCORE: 51
ADLS_ACUITY_SCORE: 51
ADLS_ACUITY_SCORE: 40
TRANSFERRING: 0-->ASSISTANCE NEEDED (DEVELOPMENTALLY APPROPRIATE)
ADLS_ACUITY_SCORE: 13
ADLS_ACUITY_SCORE: 38
ADLS_ACUITY_SCORE: 55
ADLS_ACUITY_SCORE: 13
ADLS_ACUITY_SCORE: 13
ADLS_ACUITY_SCORE: 53
ADLS_ACUITY_SCORE: 13
ADLS_ACUITY_SCORE: 13
ADLS_ACUITY_SCORE: 38
ADLS_ACUITY_SCORE: 51
ADLS_ACUITY_SCORE: 13
ADLS_ACUITY_SCORE: 13
ADLS_ACUITY_SCORE: 35
ADLS_ACUITY_SCORE: 46
ADLS_ACUITY_SCORE: 15
ADLS_ACUITY_SCORE: 35
ADLS_ACUITY_SCORE: 13
ADLS_ACUITY_SCORE: 15
ADLS_ACUITY_SCORE: 46
ADLS_ACUITY_SCORE: 55
ADLS_ACUITY_SCORE: 40
ADLS_ACUITY_SCORE: 13
ADLS_ACUITY_SCORE: 37
ADLS_ACUITY_SCORE: 53
ADLS_ACUITY_SCORE: 40
ADLS_ACUITY_SCORE: 45
ADLS_ACUITY_SCORE: 53
ADLS_ACUITY_SCORE: 55
ADLS_ACUITY_SCORE: 42
ADLS_ACUITY_SCORE: 38
ADLS_ACUITY_SCORE: 46
ADLS_ACUITY_SCORE: 53
ADLS_ACUITY_SCORE: 13
ADLS_ACUITY_SCORE: 40
ADLS_ACUITY_SCORE: 46
TRANSFERRING: 1-->ASSISTANCE (EQUIPMENT/PERSON) NEEDED
ADLS_ACUITY_SCORE: 38
ADLS_ACUITY_SCORE: 12
ADLS_ACUITY_SCORE: 13
ADLS_ACUITY_SCORE: 55
CONCENTRATING,_REMEMBERING_OR_MAKING_DECISIONS_DIFFICULTY: OTHER (SEE COMMENTS)
ADLS_ACUITY_SCORE: 57
ADLS_ACUITY_SCORE: 37
ADLS_ACUITY_SCORE: 47
ADLS_ACUITY_SCORE: 53
ADLS_ACUITY_SCORE: 13
ADLS_ACUITY_SCORE: 52
ADLS_ACUITY_SCORE: 13
ADLS_ACUITY_SCORE: 40
ADLS_ACUITY_SCORE: 60
ADLS_ACUITY_SCORE: 49
ADLS_ACUITY_SCORE: 38
ADLS_ACUITY_SCORE: 13
FALL_HISTORY_WITHIN_LAST_SIX_MONTHS: YES
ADLS_ACUITY_SCORE: 53
ADLS_ACUITY_SCORE: 42
ADLS_ACUITY_SCORE: 59
ADLS_ACUITY_SCORE: 11
ADLS_ACUITY_SCORE: 42
ADLS_ACUITY_SCORE: 42
ADLS_ACUITY_SCORE: 55
ADLS_ACUITY_SCORE: 49
ADLS_ACUITY_SCORE: 53
ADLS_ACUITY_SCORE: 41
ADLS_ACUITY_SCORE: 47
ADLS_ACUITY_SCORE: 47
DRESSING/BATHING_DIFFICULTY: NO
ADLS_ACUITY_SCORE: 57
ADLS_ACUITY_SCORE: 49
ADLS_ACUITY_SCORE: 13
ADLS_ACUITY_SCORE: 42
ADLS_ACUITY_SCORE: 53
ADLS_ACUITY_SCORE: 57
ADLS_ACUITY_SCORE: 13
ADLS_ACUITY_SCORE: 41
ADLS_ACUITY_SCORE: 45
ADLS_ACUITY_SCORE: 60
ADLS_ACUITY_SCORE: 13
ADLS_ACUITY_SCORE: 42
ADLS_ACUITY_SCORE: 57
ADLS_ACUITY_SCORE: 53
ADLS_ACUITY_SCORE: 45
ADLS_ACUITY_SCORE: 13
ADLS_ACUITY_SCORE: 41
ADLS_ACUITY_SCORE: 49
ADLS_ACUITY_SCORE: 46
ADLS_ACUITY_SCORE: 13
ADLS_ACUITY_SCORE: 13
ADLS_ACUITY_SCORE: 49
ADLS_ACUITY_SCORE: 51
ADLS_ACUITY_SCORE: 38
ADLS_ACUITY_SCORE: 13
ADLS_ACUITY_SCORE: 55
ADLS_ACUITY_SCORE: 46
ADLS_ACUITY_SCORE: 13
ADLS_ACUITY_SCORE: 13
ADLS_ACUITY_SCORE: 55
ADLS_ACUITY_SCORE: 53
ADLS_ACUITY_SCORE: 55
ADLS_ACUITY_SCORE: 46
ADLS_ACUITY_SCORE: 38
ADLS_ACUITY_SCORE: 51
ADLS_ACUITY_SCORE: 13
NUMBER_OF_TIMES_PATIENT_HAS_FALLEN_WITHIN_LAST_SIX_MONTHS: 4
ADLS_ACUITY_SCORE: 49
ADLS_ACUITY_SCORE: 53
ADLS_ACUITY_SCORE: 11
WALKING_OR_CLIMBING_STAIRS_DIFFICULTY: YES
ADLS_ACUITY_SCORE: 42
ADLS_ACUITY_SCORE: 45
ADLS_ACUITY_SCORE: 49
ADLS_ACUITY_SCORE: 44
ADLS_ACUITY_SCORE: 55
ADLS_ACUITY_SCORE: 42
ADLS_ACUITY_SCORE: 40
ADLS_ACUITY_SCORE: 49
ADLS_ACUITY_SCORE: 11
ADLS_ACUITY_SCORE: 42
ADLS_ACUITY_SCORE: 40
ADLS_ACUITY_SCORE: 60
ADLS_ACUITY_SCORE: 42
ADLS_ACUITY_SCORE: 41
ADLS_ACUITY_SCORE: 11
ADLS_ACUITY_SCORE: 46
WALKING_OR_CLIMBING_STAIRS_DIFFICULTY: YES
CHANGE_IN_FUNCTIONAL_STATUS_SINCE_ONSET_OF_CURRENT_ILLNESS/INJURY: YES
ADLS_ACUITY_SCORE: 47
ADLS_ACUITY_SCORE: 46
ADLS_ACUITY_SCORE: 49
ADLS_ACUITY_SCORE: 51
ADLS_ACUITY_SCORE: 13
EATING: 0-->ASSISTANCE NEEDED (DEVELOPMENTALLY APPROPRIATE)
ADLS_ACUITY_SCORE: 13
ADLS_ACUITY_SCORE: 55
ADLS_ACUITY_SCORE: 13
ADLS_ACUITY_SCORE: 13
ADLS_ACUITY_SCORE: 52
ADLS_ACUITY_SCORE: 49
ADLS_ACUITY_SCORE: 42
ADLS_ACUITY_SCORE: 46
ADLS_ACUITY_SCORE: 59
ADLS_ACUITY_SCORE: 13
ADLS_ACUITY_SCORE: 51
ADLS_ACUITY_SCORE: 11
ADLS_ACUITY_SCORE: 13
ADLS_ACUITY_SCORE: 11
ADLS_ACUITY_SCORE: 51
ADLS_ACUITY_SCORE: 51
ADLS_ACUITY_SCORE: 47
ADLS_ACUITY_SCORE: 51
DIFFICULTY_COMMUNICATING: NO
ADLS_ACUITY_SCORE: 41
ADLS_ACUITY_SCORE: 35
ADLS_ACUITY_SCORE: 45
ADLS_ACUITY_SCORE: 49
ADLS_ACUITY_SCORE: 13
ADLS_ACUITY_SCORE: 53
ADLS_ACUITY_SCORE: 44
ADLS_ACUITY_SCORE: 11
ADLS_ACUITY_SCORE: 55
TOILETING_ASSISTANCE: TOILETING DIFFICULTY, ASSISTANCE 1 PERSON
ADLS_ACUITY_SCORE: 42
ADLS_ACUITY_SCORE: 44
ADLS_ACUITY_SCORE: 13
ADLS_ACUITY_SCORE: 13
ADLS_ACUITY_SCORE: 52
ADLS_ACUITY_SCORE: 42
ADLS_ACUITY_SCORE: 52
ADLS_ACUITY_SCORE: 53
ADLS_ACUITY_SCORE: 15
ADLS_ACUITY_SCORE: 46
ADLS_ACUITY_SCORE: 46
WALKING_OR_CLIMBING_STAIRS: AMBULATION DIFFICULTY, DEPENDENT
ADLS_ACUITY_SCORE: 55
ADLS_ACUITY_SCORE: 11
ADLS_ACUITY_SCORE: 51
ADLS_ACUITY_SCORE: 45
ADLS_ACUITY_SCORE: 49
ADLS_ACUITY_SCORE: 11
ADLS_ACUITY_SCORE: 13
ADLS_ACUITY_SCORE: 47
ADLS_ACUITY_SCORE: 13
ADLS_ACUITY_SCORE: 50
ADLS_ACUITY_SCORE: 44
ADLS_ACUITY_SCORE: 41
ADLS_ACUITY_SCORE: 52
ADLS_ACUITY_SCORE: 47
ADLS_ACUITY_SCORE: 45
ADLS_ACUITY_SCORE: 41
ADLS_ACUITY_SCORE: 55
ADLS_ACUITY_SCORE: 40
ADLS_ACUITY_SCORE: 15
ADLS_ACUITY_SCORE: 35
ADLS_ACUITY_SCORE: 41
ADLS_ACUITY_SCORE: 51
ADLS_ACUITY_SCORE: 53
ADLS_ACUITY_SCORE: 45
ADLS_ACUITY_SCORE: 55
ADLS_ACUITY_SCORE: 11
WALKING_OR_CLIMBING_STAIRS: AMBULATION DIFFICULTY, ASSISTANCE 1 PERSON
EATING/SWALLOWING: EATING;SWALLOWING LIQUIDS;SWALLOWING SOLID FOOD
ADLS_ACUITY_SCORE: 51
ADLS_ACUITY_SCORE: 41
ADLS_ACUITY_SCORE: 13
ADLS_ACUITY_SCORE: 13
ADLS_ACUITY_SCORE: 45
ADLS_ACUITY_SCORE: 13
ADLS_ACUITY_SCORE: 43
ADLS_ACUITY_SCORE: 38
ADLS_ACUITY_SCORE: 13
ADLS_ACUITY_SCORE: 51
ADLS_ACUITY_SCORE: 49
ADLS_ACUITY_SCORE: 42
ADLS_ACUITY_SCORE: 51
ADLS_ACUITY_SCORE: 13
DRESSING/BATHING_DIFFICULTY: YES
ADLS_ACUITY_SCORE: 55
ADLS_ACUITY_SCORE: 55
ADLS_ACUITY_SCORE: 41
ADLS_ACUITY_SCORE: 13
ADLS_ACUITY_SCORE: 54
ADLS_ACUITY_SCORE: 55
ADLS_ACUITY_SCORE: 13
ADLS_ACUITY_SCORE: 13
ADLS_ACUITY_SCORE: 42
ADLS_ACUITY_SCORE: 54
ADLS_ACUITY_SCORE: 49
ADLS_ACUITY_SCORE: 46
ADLS_ACUITY_SCORE: 15
ADLS_ACUITY_SCORE: 53
ADLS_ACUITY_SCORE: 45
ADLS_ACUITY_SCORE: 60
ADLS_ACUITY_SCORE: 11
ADLS_ACUITY_SCORE: 42
ADLS_ACUITY_SCORE: 53
ADLS_ACUITY_SCORE: 15
ADLS_ACUITY_SCORE: 45
ADLS_ACUITY_SCORE: 59
EQUIPMENT_CURRENTLY_USED_AT_HOME: WALKER, ROLLING;WHEELCHAIR, MANUAL
ADLS_ACUITY_SCORE: 15
ADLS_ACUITY_SCORE: 13
ADLS_ACUITY_SCORE: 50
ADLS_ACUITY_SCORE: 51
ADLS_ACUITY_SCORE: 41
ADLS_ACUITY_SCORE: 40
ADLS_ACUITY_SCORE: 13
ADLS_ACUITY_SCORE: 49
ADLS_ACUITY_SCORE: 45
ADLS_ACUITY_SCORE: 15
ADLS_ACUITY_SCORE: 60
ADLS_ACUITY_SCORE: 40
ADLS_ACUITY_SCORE: 49
ADLS_ACUITY_SCORE: 54
ADLS_ACUITY_SCORE: 15
ADLS_ACUITY_SCORE: 52
ADLS_ACUITY_SCORE: 40
ADLS_ACUITY_SCORE: 52
ADLS_ACUITY_SCORE: 11
ADLS_ACUITY_SCORE: 15
ADLS_ACUITY_SCORE: 13
ADLS_ACUITY_SCORE: 45
ADLS_ACUITY_SCORE: 55
ADLS_ACUITY_SCORE: 49
ADLS_ACUITY_SCORE: 42
ADLS_ACUITY_SCORE: 13
ADLS_ACUITY_SCORE: 13
ADLS_ACUITY_SCORE: 47
ADLS_ACUITY_SCORE: 53
ADLS_ACUITY_SCORE: 49
ADLS_ACUITY_SCORE: 52
ADLS_ACUITY_SCORE: 49
ADLS_ACUITY_SCORE: 13
ADLS_ACUITY_SCORE: 49
ADLS_ACUITY_SCORE: 42
ADLS_ACUITY_SCORE: 37
ADLS_ACUITY_SCORE: 51
ADLS_ACUITY_SCORE: 49
ADLS_ACUITY_SCORE: 45
ADLS_ACUITY_SCORE: 13
ADLS_ACUITY_SCORE: 40
ADLS_ACUITY_SCORE: 59
VISION_MANAGEMENT: GLASSES
ADLS_ACUITY_SCORE: 46
ADLS_ACUITY_SCORE: 49
ADLS_ACUITY_SCORE: 52
ADLS_ACUITY_SCORE: 13
ADLS_ACUITY_SCORE: 52
ADLS_ACUITY_SCORE: 45
ADLS_ACUITY_SCORE: 59
ADLS_ACUITY_SCORE: 46
TRANSFERRING: 1-->ASSISTANCE (EQUIPMENT/PERSON) NEEDED (NOT DEVELOPMENTALLY APPROPRIATE)
ADLS_ACUITY_SCORE: 52
ADLS_ACUITY_SCORE: 44
HEARING_DIFFICULTY_OR_DEAF: NO
ADLS_ACUITY_SCORE: 51
ADLS_ACUITY_SCORE: 49
ADLS_ACUITY_SCORE: 13
ADLS_ACUITY_SCORE: 13
ADLS_ACUITY_SCORE: 46
ADLS_ACUITY_SCORE: 60
ADLS_ACUITY_SCORE: 51
ADLS_ACUITY_SCORE: 40
ADLS_ACUITY_SCORE: 45
ADLS_ACUITY_SCORE: 49
ADLS_ACUITY_SCORE: 13
ADLS_ACUITY_SCORE: 13
ADLS_ACUITY_SCORE: 53
ADLS_ACUITY_SCORE: 13
ADLS_ACUITY_SCORE: 49
ADLS_ACUITY_SCORE: 46
ADLS_ACUITY_SCORE: 53
ADLS_ACUITY_SCORE: 13
ADLS_ACUITY_SCORE: 40
ADLS_ACUITY_SCORE: 11
ADLS_ACUITY_SCORE: 52
ADLS_ACUITY_SCORE: 38
ADLS_ACUITY_SCORE: 13
ADLS_ACUITY_SCORE: 59
ADLS_ACUITY_SCORE: 46
ADLS_ACUITY_SCORE: 38
ADLS_ACUITY_SCORE: 13
ADLS_ACUITY_SCORE: 13
ADLS_ACUITY_SCORE: 45
ADLS_ACUITY_SCORE: 53
ADLS_ACUITY_SCORE: 42
ADLS_ACUITY_SCORE: 51
ADLS_ACUITY_SCORE: 37
EATING/SWALLOWING: OTHER (SEE COMMENTS)
ADLS_ACUITY_SCORE: 55
ADLS_ACUITY_SCORE: 46
ADLS_ACUITY_SCORE: 42
ADLS_ACUITY_SCORE: 13
ADLS_ACUITY_SCORE: 51
DRESSING/BATHING: BATHING DIFFICULTY, ASSISTANCE 1 PERSON
ADLS_ACUITY_SCORE: 37
ADLS_ACUITY_SCORE: 13
ADLS_ACUITY_SCORE: 37
ADLS_ACUITY_SCORE: 51
ADLS_ACUITY_SCORE: 53
ADLS_ACUITY_SCORE: 13
ADLS_ACUITY_SCORE: 51
ADLS_ACUITY_SCORE: 11
ADLS_ACUITY_SCORE: 37
ADLS_ACUITY_SCORE: 52
ADLS_ACUITY_SCORE: 55
ADLS_ACUITY_SCORE: 53
ADLS_ACUITY_SCORE: 37
ADLS_ACUITY_SCORE: 13
ADLS_ACUITY_SCORE: 46
ADLS_ACUITY_SCORE: 11
ADLS_ACUITY_SCORE: 55
ADLS_ACUITY_SCORE: 53
ADLS_ACUITY_SCORE: 36
ADLS_ACUITY_SCORE: 42
ADLS_ACUITY_SCORE: 55
ADLS_ACUITY_SCORE: 47
ADLS_ACUITY_SCORE: 13
ADLS_ACUITY_SCORE: 45
ADLS_ACUITY_SCORE: 47
DIFFICULTY_EATING/SWALLOWING: YES
ADLS_ACUITY_SCORE: 52
ADLS_ACUITY_SCORE: 49
ADLS_ACUITY_SCORE: 41
ADLS_ACUITY_SCORE: 55
FALL_HISTORY_WITHIN_LAST_SIX_MONTHS: YES
ADLS_ACUITY_SCORE: 45
ADLS_ACUITY_SCORE: 40
ADLS_ACUITY_SCORE: 53
ADLS_ACUITY_SCORE: 42
ADLS_ACUITY_SCORE: 13
ADLS_ACUITY_SCORE: 53
ADLS_ACUITY_SCORE: 35
ADLS_ACUITY_SCORE: 42
ADLS_ACUITY_SCORE: 42
CHANGE_IN_FUNCTIONAL_STATUS_SINCE_ONSET_OF_CURRENT_ILLNESS/INJURY: YES
ADLS_ACUITY_SCORE: 13
ADLS_ACUITY_SCORE: 52
ADLS_ACUITY_SCORE: 13
ADLS_ACUITY_SCORE: 51
ADLS_ACUITY_SCORE: 13
ADLS_ACUITY_SCORE: 11
ADLS_ACUITY_SCORE: 11
ADLS_ACUITY_SCORE: 13
ADLS_ACUITY_SCORE: 40
ADLS_ACUITY_SCORE: 13
ADLS_ACUITY_SCORE: 60
ADLS_ACUITY_SCORE: 13
ADLS_ACUITY_SCORE: 13
ADLS_ACUITY_SCORE: 42
ADLS_ACUITY_SCORE: 46
ADLS_ACUITY_SCORE: 42
ADLS_ACUITY_SCORE: 50
ADLS_ACUITY_SCORE: 42
ADLS_ACUITY_SCORE: 13
ADLS_ACUITY_SCORE: 53
ADLS_ACUITY_SCORE: 38
ADLS_ACUITY_SCORE: 52
ADLS_ACUITY_SCORE: 38
ADLS_ACUITY_SCORE: 59
ADLS_ACUITY_SCORE: 38
ADLS_ACUITY_SCORE: 44
ADLS_ACUITY_SCORE: 11
ADLS_ACUITY_SCORE: 55
ADLS_ACUITY_SCORE: 13
ADLS_ACUITY_SCORE: 49
ADLS_ACUITY_SCORE: 55
ADLS_ACUITY_SCORE: 51
ADLS_ACUITY_SCORE: 13
ADLS_ACUITY_SCORE: 49
ADLS_ACUITY_SCORE: 41
ADLS_ACUITY_SCORE: 49
ADLS_ACUITY_SCORE: 59
ADLS_ACUITY_SCORE: 11
ADLS_ACUITY_SCORE: 15
ADLS_ACUITY_SCORE: 51
ADLS_ACUITY_SCORE: 15
ADLS_ACUITY_SCORE: 53
ADLS_ACUITY_SCORE: 44
ADLS_ACUITY_SCORE: 13
ADLS_ACUITY_SCORE: 13
ADLS_ACUITY_SCORE: 38
TOILETING_ISSUES: NO
ADLS_ACUITY_SCORE: 50
ADLS_ACUITY_SCORE: 42
ADLS_ACUITY_SCORE: 51
ADLS_ACUITY_SCORE: 13
ADLS_ACUITY_SCORE: 60
ADLS_ACUITY_SCORE: 45
ADLS_ACUITY_SCORE: 51
ADLS_ACUITY_SCORE: 55
ADLS_ACUITY_SCORE: 37
ADLS_ACUITY_SCORE: 50
ADLS_ACUITY_SCORE: 54
ADLS_ACUITY_SCORE: 33
ADLS_ACUITY_SCORE: 55
ADLS_ACUITY_SCORE: 13
ADLS_ACUITY_SCORE: 11
ADLS_ACUITY_SCORE: 13
ADLS_ACUITY_SCORE: 47
ADLS_ACUITY_SCORE: 42
ADLS_ACUITY_SCORE: 13
ADLS_ACUITY_SCORE: 13
ADLS_ACUITY_SCORE: 37
ADLS_ACUITY_SCORE: 13
ADLS_ACUITY_SCORE: 11
ADLS_ACUITY_SCORE: 40
ADLS_ACUITY_SCORE: 55
ADLS_ACUITY_SCORE: 55
ADLS_ACUITY_SCORE: 13
ADLS_ACUITY_SCORE: 44
ADLS_ACUITY_SCORE: 59
ADLS_ACUITY_SCORE: 46
ADLS_ACUITY_SCORE: 46
ADLS_ACUITY_SCORE: 50
ADLS_ACUITY_SCORE: 15
ADLS_ACUITY_SCORE: 49
ADLS_ACUITY_SCORE: 41
ADLS_ACUITY_SCORE: 44
ADLS_ACUITY_SCORE: 53
ADLS_ACUITY_SCORE: 11
DOING_ERRANDS_INDEPENDENTLY_DIFFICULTY: NO
ADLS_ACUITY_SCORE: 49
ADLS_ACUITY_SCORE: 11
ADLS_ACUITY_SCORE: 45
ADLS_ACUITY_SCORE: 37
ADLS_ACUITY_SCORE: 42
ADLS_ACUITY_SCORE: 11
ADLS_ACUITY_SCORE: 50
SWALLOWING: 2-->DIFFICULTY SWALLOWING LIQUIDS
ADLS_ACUITY_SCORE: 40
ADLS_ACUITY_SCORE: 13
ADLS_ACUITY_SCORE: 59
ADLS_ACUITY_SCORE: 40
ADLS_ACUITY_SCORE: 46
ADLS_ACUITY_SCORE: 52
ADLS_ACUITY_SCORE: 45
ADLS_ACUITY_SCORE: 52
ADLS_ACUITY_SCORE: 38
ADLS_ACUITY_SCORE: 55
ADLS_ACUITY_SCORE: 15
ADLS_ACUITY_SCORE: 45
ADLS_ACUITY_SCORE: 13
ADLS_ACUITY_SCORE: 45
ADLS_ACUITY_SCORE: 38
ADLS_ACUITY_SCORE: 13
ADLS_ACUITY_SCORE: 37
ADLS_ACUITY_SCORE: 11
ADLS_ACUITY_SCORE: 50
ADLS_ACUITY_SCORE: 52
ADLS_ACUITY_SCORE: 40
ADLS_ACUITY_SCORE: 38
ADLS_ACUITY_SCORE: 47
ADLS_ACUITY_SCORE: 49
ADLS_ACUITY_SCORE: 13
ADLS_ACUITY_SCORE: 50
ADLS_ACUITY_SCORE: 37

## 2022-01-01 ASSESSMENT — ENCOUNTER SYMPTOMS
DIARRHEA: 0
CHILLS: 0
CHILLS: 1
DYSRHYTHMIAS: 1
DYSURIA: 0
FEVER: 0
FATIGUE: 1
VOMITING: 0
FEVER: 0
DIARRHEA: 0
NAUSEA: 0
DYSRHYTHMIAS: 1
WEAKNESS: 1
DIAPHORESIS: 0

## 2022-01-01 ASSESSMENT — PATIENT HEALTH QUESTIONNAIRE - PHQ9
SUM OF ALL RESPONSES TO PHQ QUESTIONS 1-9: 1
SUM OF ALL RESPONSES TO PHQ QUESTIONS 1-9: 8

## 2022-01-01 ASSESSMENT — MIFFLIN-ST. JEOR
SCORE: 1303.23
SCORE: 1323.64
SCORE: 1289.17
SCORE: 1315.02
SCORE: 1281
SCORE: 1295.97
SCORE: 1305.5
SCORE: 1314.11
SCORE: 1296.42
SCORE: 1301.87
SCORE: 1321.37
SCORE: 1310.49
SCORE: 1317.29
SCORE: 1322.74

## 2022-01-01 ASSESSMENT — PAIN SCALES - GENERAL
PAINLEVEL: EXTREME PAIN (8)
PAINLEVEL: SEVERE PAIN (6)
PAINLEVEL: SEVERE PAIN (6)
PAINLEVEL: EXTREME PAIN (9)

## 2022-01-01 ASSESSMENT — COPD QUESTIONNAIRES: COPD: 1

## 2022-01-01 ASSESSMENT — LIFESTYLE VARIABLES: TOBACCO_USE: 1

## 2022-01-11 PROBLEM — R53.1 WEAKNESS: Status: ACTIVE | Noted: 2022-01-01

## 2022-01-11 PROBLEM — R93.89 ABNORMAL CHEST X-RAY: Status: ACTIVE | Noted: 2022-01-01

## 2022-01-11 PROBLEM — D72.829 LEUKOCYTOSIS, UNSPECIFIED TYPE: Status: ACTIVE | Noted: 2022-01-01

## 2022-01-11 NOTE — PLAN OF CARE
-diagnostic tests and consults completed and resulted: NOT MET  -vital signs normal or at patient baseline: MET   -returns to baseline functional status: NOT MET   -safe disposition plan has been identified: NOT MET      Nurse to notify provider when observation goals have been met and patient is ready for discharge.    Summary:  fatigue/generalized weakness, weight loss, increased forgetfulness since wife  in September  Orientation: A&Ox4  Observation Goals (met & not met): not met   Activity Level: SBA GB/W  Fall Risk: yes   Behavior & Aggression Tool Color: green   Pain Management: yes, coccyx- mepilex applied   ABNL VS/O2: VSS RA   ABNL Lab/BG: lactic acid 1.6, WBC 14.4,   Diet: NPO until Esophagram. Pureed diet, mildly thick   Skin: blanchable redness sacrum- mepilex applied   Bowel/Bladder: continent. 1 BM this shift  Drains/Devices: PIV- SL   Tests/Procedures for next shift: esophagram today 1500. Video swallow study tomorrow 22   Anticipated DC date: pending speech and GI  Other Important Info: PT/speech/SW/GI/nutrition consulted. RUE/RLE numbness-baseline from prior stroke.

## 2022-01-11 NOTE — CONSULTS
"CLINICAL NUTRITION SERVICES - BRIEF NOTE    Consult received for Provider Order - weight loss, rec foods to help gain and/or maintain weight and Provider Order - weight loss/malnutrition    Please note a full nutrition assessment will be deferred at this time as patient is currently observation status. Patient may be referred to outpatient RD as appropriate upon discharge.     Briefly met with Efrem this afternoon before he was taken to XR. States he has been having increased difficulty swallowing over the past several months and particularly struggles with fibrous foods and vegetables as well as tough meats. Eats a lot of yogurt and drinks Boost supplements and a \"vitamin juice\" at home. Son has been helping prepare meals for patient.     Per chart review, pt reported a 8# wt loss in the past week. Has a hx/o esophageal stricture requiring dilation in the past. Has been restricting his oral intake with fear of coughing and choking.     SLP recommended pureed diet + mildly thick liquids today.     Interventions:  - Vital Cuisine once daily at 2 pm (520 kcal, 22 g protein) while remains in hospital  - Provided handouts on (1) Tips for Increasing Calories in Your Diet, (2) Tips for Increasing Protein in Your Diet, and (3) High-Calorie, High-Protein Recipes     Tara Sauceda, RD, LD    "

## 2022-01-11 NOTE — ED NOTES
[facility]  ED Nurse Handoff Report    ED Chief complaint: Generalized Weakness and Abnormal Labs      ED Diagnosis:   Final diagnoses:   Weakness   Leukocytosis, unspecified type   Abnormal chest x-ray       Code Status: to be addressed by admitting provider    Allergies:   Allergies   Allergen Reactions     Sulfa Drugs Difficulty breathing, Swelling and Hives     Adhesive Tape Blisters     Amiodarone Other (See Comments)     Developed pleural effusion     Penicillins Other (See Comments)     Reaction occurred as a child  Other reaction(s): Hives       Patient Story: Generalized weakness x2 weeks with increased fatigue and poor appetite. Denies pain.     Focused Assessment: A&Ox4. VSS in ED. Denies pain. Son unable to care for patient at home d/t symptoms. Calm, cooperative.     CT Chest w/o Contrast   Preliminary Result   IMPRESSION:    1.  Progressed emphysema.    2.  Progressed fibrotic changes with bronchiectasis and increased interstitial consolidation consistent with underlying usual interstitial pneumonitis, correlate to exclude superimposed infection.   3.  Nonspecific, increasing mediastinal lymphadenopathy, correlate to exclude underlying malignancy or lymphoproliferative cause. No discrete pulmonary mass.   4.  Mildly enlarged heart with coronary artery disease and atherosclerotic vascular disease.         CT Head w/o Contrast   Final Result   IMPRESSION:   1.  No acute intracranial pathology.   2.  Stable chronic changes as above.      XR Chest Port 1 View   Final Result   IMPRESSION: Normal heart size and pulmonary vascularity. There is some asymmetric interstitial prominence in the right upper lung with a mild interstitial pneumonitis not excluded. Clinical correlation. Left lung is clear. Small right pleural effusion.    Prominent mitral annular calcification. Remainder unremarkable.        Labs Ordered and Resulted from Time of ED Arrival to Time of ED Departure   ROUTINE UA WITH  MICROSCOPIC REFLEX TO CULTURE - Abnormal       Result Value    Color Urine Light Yellow      Appearance Urine Clear      Glucose Urine Negative      Bilirubin Urine Negative      Ketones Urine Negative      Specific Gravity Urine 1.014      Blood Urine Negative      pH Urine 5.5      Protein Albumin Urine Negative      Urobilinogen Urine Normal      Nitrite Urine Negative      Leukocyte Esterase Urine Negative      Mucus Urine Present (*)     RBC Urine 2      WBC Urine 1      Hyaline Casts Urine 6 (*)    PROCALCITONIN - Normal    Procalcitonin <0.05     TSH WITH FREE T4 REFLEX - Normal    TSH 2.58     TROPONIN I - Normal    Troponin I High Sensitivity 34     DIGOXIN LEVEL - Normal    Digoxin 1.3     INFLUENZA A/B & SARS-COV2 PCR MULTIPLEX - Normal    Influenza A PCR Negative      Influenza B PCR Negative      SARS CoV2 PCR Negative     BLOOD CULTURE   BLOOD CULTURE       Treatments and/or interventions provided: CT head, CXR, CT chest, labs, covid swab    Medications   apixaban ANTICOAGULANT (ELIQUIS) tablet 5 mg (has no administration in time range)   digoxin (LANOXIN) tablet 125 mcg (has no administration in time range)   clopidogrel (PLAVIX) tablet 75 mg (has no administration in time range)   atorvastatin (LIPITOR) tablet 40 mg (has no administration in time range)   acetaminophen (TYLENOL) tablet 325-650 mg (has no administration in time range)   metoprolol succinate ER (TOPROL-XL) 24 hr tablet 25 mg (has no administration in time range)   mirtazapine (REMERON) tablet 15 mg (15 mg Oral Given 1/11/22 0254)   gabapentin (NEURONTIN) capsule 300 mg (300 mg Oral Given 1/11/22 0253)   budesonide (ENTOCORT EC) EC capsule 3 mg (has no administration in time range)   0.9% sodium chloride BOLUS (0 mLs Intravenous Stopped 1/11/22 0156)     Patient's response to treatments and/or interventions: resting on cart    To be done/followed up on inpatient unit:  Continue with plan of care per admitting MD.    Does this patient  have any cognitive concerns?: none    Activity level - Baseline/Home:  Stand with Assist  Activity Level - Current:   Unknown    Patient's Preferred language: English   Needed?: No    Isolation: None  Infection: Not Applicable  Patient tested for COVID 19 prior to admission: YES  Bariatric?: No    Vital Signs:   Vitals:    01/11/22 0000 01/11/22 0100 01/11/22 0200 01/11/22 0255   BP: 95/69   107/81   Pulse: 74 77 99 76   Resp: 23 17 23 13   Temp:       TempSrc:       SpO2: 97% 97% 99% 95%   Weight:       Height:           Cardiac Rhythm:Cardiac Rhythm: Normal sinus rhythm    Was the PSS-3 completed:   Yes  What interventions are required if any?               Family Comments: Son at bedside in ED. Patient has number to contact  OBS brochure/video discussed/provided to patient/family: Yes             For the majority of the shift this patient's behavior was Green.   Behavioral interventions performed were NA.    ED NURSE PHONE NUMBER: *[phone]

## 2022-01-11 NOTE — H&P
[facility]    History and Physical - Hospitalist Service       Date of Admission:  1/10/2022    Assessment & Plan      Efrem Ramos is a 76 year old male admitted on 1/10/2022. He presents to the emergency department after he was found to have an elevated white count at urgent care.  Was at urgent care and is he has been having increasing coughing, poor intake, and fatigue.  Reports that he has difficulty swallowing as he has trouble chewing his food.    Leukocytosis, macrocytosis: Patient has had some increasing memory impairment since his wife's death in September.  This led to urgent care evaluation where he was found to have leukocytosis and subsequently referred to the emergency department.  White count of 21.7 with neutrophilic predominance, though no fevers, no chills.  Undetectable procalcitonin.  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  No clear infectious etiology with undetectable procalcitonin at this time.  -Trend CBC  -Oncology consult as below; some concern for developing lymphoproliferative disorder in the setting of underlying MGUS  -Peripheral blood morphology pending  -Folgard tablet daily given history of B12 deficiency    Dysphagia: Patient reports that he has difficulty chewing secondary to his dentition.  He especially struggles with fibrous foods as he feels he cannot chew them fully.  Does have a history of esophageal stricture requiring dilation in the past.  Has been restricting his oral intake for fear of coughing and choking  -Speech pathology consulted  -[facility] consulted.  Patient underwent last endoscopy with esophageal stricture in 2018.  Note that he has multiple significant comorbid factors including malnutrition, pulmonary fibrosis, severe coronary artery disease and aortic stenosis as well as chronic valvular heart failure.  Generally a high risk procedural  candidate.  -Advance diet as tolerated to a mechanical soft diet    Pulmonary fibrosis: Possibly related to historical amiodarone toxicity?  Uncertain if related to patient's cough currently.  This has been noted previously on CT imaging in February 2020.  Patient also has a history of organizing pneumonia by biopsy in 2016.  He has no fevers, no chills, no hypoxia.  Cough is suggestive of bronchitis versus ongoing issues with dysphagia.  -CT chest without contrast performed in the setting of possible right upper pneumonia and leukocytosis absent infectious symptoms.  Pulmonary fibrosis noted  -Pulmonary consulted  -Sputum culture if able; patient has not had a productive cough, however    Severe valvular heart disease including severe aortic stenosis with aortic valve area of 0.8 cm, severe tricuspid regurgitation.  Resultant chronic valvular heart failure, which currently appears to be compensated.  Most recent echocardiogram December 6, 2021 with preserved LVEF, moderately dilated right ventricle.  Patient is followed by cardiology, recently discussed at TAVR conference.  Could potentially be a candidate for high risk TAVR using transcaval approach by recent telephone encounter, though this appears to be in the setting of if patient was thought to benefit from back surgery.  -Repeat TTE     Severe coronary artery disease: Rotational arthrectomy and drug-eluting stent x4 to proximal to distal RCA in May 2020.  Complicated by left femoral artery bleed with retroperitoneal and scrotal hematoma.  -Continue Plavix  -Continue prior to admission Eliquis  -Continue prior to admission atorvastatin 40 mg daily   -continue metoprolol XL 25 mg daily    Persistent atrial fibrillation: Severe biatrial enlargement On prior echocardiogram.  -Continue prior to admission Eliquis; [id number] of 6  -Continue prior to admission digoxin  -Cardiac telemetry  -Continue metoprolol XL 25 mg daily    Moderate to severe protein calorie  nutrition: Reports 8 pound weight loss in the past week.  Dysphagia with coughing and choking has caused him to restrict his intake, and this seems the most likely cause of his weight loss and malnutrition.  Consider also cardiac wasting in the setting of known severe valvular heart disease.  -Nutrition consulted  -Speech pathology consulted as above   -Daily weights  -Continue Remeron 15 mg at bedtime    Monoclonal gammopathy of uncertain significance: No known lytic lesions.  Increasing mediastinal lymphadenopathy noted on CT imaging.  Note also patient with occult blood positivity in November 2021, though this is in the setting of suspected collagenous colitis.  -Oncology consulted as significant risk for lymphoproliferative disorder developing defer flow cytometry or recommendation regarding mediastinal lymphadenopathy biopsy to oncology team.  If needed, this could potentially be coordinated with gastroenterology service via EUS    Collagenous colitis  -Continue budesonide 3 mg daily   -Gastroenterology consult as above       Diet:   Advance diet as tolerated  DVT Prophylaxis: Ambulate every shift  Bravo Catheter: Not present  Central Lines: None  Code Status:  Full code.  Discussed with patient on admission.  He is not entirely certain as to what he would want his CODE STATUS to be, though tells me that his son would want him to be full code.  He recognizes that he has a likely inoperable heart condition.  This should continue to be addressed with patient and his son with a low threshold for care conference given multiple significant comorbidities and declining health, especially if he is thought to be an unlikely candidate for intervention on his severe aortic stenosis.    Clinically Significant Risk Factors Present on Admission             # Coagulation Defect: home medication list includes an anticoagulant medication  # Platelet Defect: home medication list includes an antiplatelet medication        Disposition Plan   Expected Discharge:  anticipated 2022 pending speech pathology and gastroenterology evaluation  Anticipated discharge location:  Awaiting care coordination huddle  Delays:           The patient's care was discussed with the Patient and Dr. Elliott in the emergency department.    Jonathan Franks MD  [facility]  Securely message with the Vocera Web Console (learn more here)  Text page via [employer] Paging/Directory        ______________________________________________________________________    Chief Complaint   Fatigue, increasing forgetfulness, weight loss    History is obtained from patient, chart review, discussion with Dr. Wolfe the emergency department.    History of Present Illness   Efrem Ramos is a 76 year old male who presents to the emergency department with several weeks of fatigue, 1 week of weight loss resulting in 8 pound drop in weight, increasing forgetfulness since his wife's death in September.    Patient tells me that his son brought him to the urgent care as he was concerned about his forgetfulness and fatigue.  He was found to have an elevated white blood count, so sent to the emergency department for evaluation.    In the emergency department, a chest x-ray was performed and suspicious for possible right upper lobe infiltrate.  Patient tells me that approximately 3 weeks ago, both he and his son had an upper respiratory illness which has since resolved.  He is afebrile, no rigors.  He does feel chilled here in the emergency department after receiving IV fluids in a cool room.  Tells me that he was not cold at home.    Patient reports that he has a history of stroke, and that he has issues with memory impairment.  Since his wife  in September of pneumonia, this has been more notable.  Patient tells me that his son lost his mother, and now is afraid that he will lose his father; he reports this is an explanation as to why he was brought  to the emergency department for evaluation.    Patient with known abnormal lung imaging.  Biopsy diagnosed organizing pneumonia in 2016 with pulmonary fibrosis on February 2020 CT imaging.  Known coronary artery disease with bronchiectasis, bronchitis findings.  Has a history of esophageal stricture with recent EGD in 2018.    Patient reports that he has lost 8 pounds as he is having trouble chewing his food to the point where he is able to swallow.  He attributes this to worn down molars and wonders if he might be prescribed a liquid diet.  Coughing and choking when he is eating, and son reported that he is coughing and choking when he drinks fluids laying down as well.    In the emergency department, undetectable procalcitonin.  Questionable right upper lobe infiltrate.  I have obtained a CT without contrast of the chest which demonstrates some mediastinal lymph nodes as well as pulmonary fibrosis findings.  No clear infiltrate appreciated.      Patient with some nonproductive cough here in the emergency department.  No hypoxia.  Primary issue here appears to be dysphagia and inability to tolerate oral intake.  Leukocytosis, which has been increasing since October and in the absence of clear infectious etiology, is concerning for lymphoproliferative disorder.  No certain diagnosis of this, however.    Review of Systems    The 10 point Review of Systems is negative other than noted in the HPI or here.  No fevers or chills  No shortness of breath  Reports no nausea, no diarrhea.  Will occasionally have low-volume emesis of undigested food when he attempts to swallow    Past Medical History    I have reviewed this patient's medical history and updated it with pertinent information if needed.   Past Medical History:   Diagnosis Date     Atrial fibrillation (H)     amiodarone therapy discontinued due to pulmonary toxicity     Atrial flutter (H)      Benign essential hypertension 11/20/2018     Cancer (H) vocal cord      Carpal tunnel syndrome     abstracted 7/3/02.     Coronary artery disease involving native coronary artery of native heart without angina pectoris 5/8/2020    Cath 5/28/2020: patent stents; Cath 5/18/2020: ISABEL x4 to RCA; Cath 3/2020: 1 vessel disease; Cath 2017: moderate 2 vessel disease     CVA (cerebral infarction) 5/5/2015     Demyelinating disease of central nervous system, unspecified (H)     abstracted 7/3/02.     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     Pneumonia 8/17/2016     PVD (peripheral vascular disease) (H)      Redundant colon     needs CT colonography     Shingles      SKIN DISORDERS NEC 3/15/2005     Sleep apnea      Sleep apnea      SVT (supraventricular tachycardia) (H)        Past Surgical History   I have reviewed this patient's surgical history and updated it with pertinent information if needed.  Past Surgical History:   Procedure Laterality Date     BIOPSY  brain 2002     BONE MARROW BIOPSY, BONE SPECIMEN, NEEDLE/TROCAR N/A 6/8/2017    Procedure: BIOPSY BONE MARROW;  UNILATERAL BONE MARROW BIOPSY (CONSCIOUS SEDATION) ;  Surgeon: Jamie Gonzales MD;  Location:  GI     CARDIAC CATHERIZATION  09/05/2017    2V CAD, IFR of RCA 0.95     CV CORONARY ANGIOGRAM N/A 3/23/2020    Procedure: Coronary Angiogram and right heart cath;  Surgeon: Gino Ferrer MD;  Location:   HEART CARDIAC CATH LAB     CV HEART CATHETERIZATION WITH POSSIBLE INTERVENTION N/A 5/28/2020    Procedure: Heart Catheterization with Possible Intervention;  Surgeon: Larry Chawla MD;  Location:  HEART CARDIAC CATH LAB     CV HEART CATHETERIZATION WITH POSSIBLE INTERVENTION N/A 5/18/2020    Procedure: Heart Catheterization with Possible Intervention;  Surgeon: Gaurang Swenson MD;  Location:  HEART CARDIAC CATH LAB     CV INSTANTANEOUS WAVE-FREE RATIO N/A 3/23/2020    Procedure: Instantaneous Wave-Free Ratio;  Surgeon: Gino Ferrer MD;  Location:  HEART CARDIAC CATH LAB     CV PCI ATHERECTOMY ORBITAL N/A 5/18/2020    Procedure: Percutaneous Coronary Intervention Atherectomy Rotational;  Surgeon: Gaurang Swenson MD;  Location:  HEART CARDIAC CATH LAB     CV PCI STENT DRUG ELUTING N/A 5/18/2020    Procedure: Percutaneous Coronary Intervention Stent Drug Eluting;  Surgeon: Gaurang Swenson MD;  Location:  HEART CARDIAC CATH LAB     CV RIGHT HEART CATH MEASUREMENTS RECORDED N/A 5/28/2020    Procedure: Right Heart Cath;  Surgeon: Larry Chawla MD;  Location:  HEART CARDIAC CATH LAB     CV TEMPORARY PACEMAKER INSERTION N/A 5/18/2020    Procedure: Temporary Pacemaker Insertion;  Surgeon: Gaurang Swenson MD;  Location:  HEART CARDIAC CATH LAB     ESOPHAGOSCOPY, GASTROSCOPY, DUODENOSCOPY (EGD), COMBINED N/A 9/8/2018    Procedure: COMBINED ESOPHAGOSCOPY, GASTROSCOPY, DUODENOSCOPY (EGD), BIOPSY SINGLE OR MULTIPLE;;  Surgeon: Xander Marie MD;  Location:  GI     HEAD & NECK SURGERY       ORTHOPEDIC SURGERY      right arm ulna reset after injury     THORACOSCOPIC WEDGE RESECTION LUNG Right 8/2/2016    Procedure: THORACOSCOPIC WEDGE RESECTION LUNG;  Surgeon: Abdelrahman Noriega MD;  Location:  OR     [facility] NONSPECIFIC PROCEDURE  as a child    T & A. abstracted 7/3/02.     ZZC NONSPECIFIC PROCEDURE  early    CTR. abstracted 7/3/02.       Social History    I have reviewed this patient's social history and updated it with pertinent information if needed.  Social History     Tobacco Use     Smoking status: Former Smoker     Packs/day: 1.00     Years: 30.00     Pack years: 30.00     Types: Cigarettes     Quit date: 2013     Years since quittin.4     Smokeless tobacco: Never Used   Substance Use Topics     Alcohol use: Not Currently     Alcohol/week: 42.0 standard drinks     Types: 42 Standard drinks or equivalent per week     Drug use: No       Family History   I have reviewed this patient's family history and updated it with pertinent information if needed.  Family History   Problem Relation Age of Onset     Blood Disease Mother         Anemia     Cardiovascular Father      Cancer - colorectal Maternal Grandfather      Hypertension Brother      Diabetes Sister 5        Juvinile Diabetes passed at 36     Respiratory Other         Lung Cancer       Prior to Admission Medications   Prior to Admission Medications   Prescriptions Last Dose Informant Patient Reported? Taking?   acetaminophen (TYLENOL) 325 MG tablet Past Month at Unknown time  Yes Yes   Sig: Take 325-650 mg by mouth every 6 hours as needed for mild pain    apixaban ANTICOAGULANT (ELIQUIS) 5 MG tablet 1/10/2022 at AM  No Yes   Sig: Take 1 tablet (5 mg) by mouth 2 times daily   atorvastatin (LIPITOR) 40 MG tablet 1/10/2022 at Unknown time  No Yes   Sig: Take 1 tablet (40 mg) by mouth daily   budesonide (ENTOCORT EC) 3 MG EC capsule 1/10/2022 at 3 mg  No Yes   Si mg daily x 6 weeks, then 6 mg daily x 2 weeks, then 3 mg daily x 2 weeks   calcium carbonate 600 mg-vitamin D 400 units (CALTRATE) 600-400 MG-UNIT per tablet 1/10/2022 at AM  Yes Yes   Sig: Take 1 tablet by mouth 2 times daily    clopidogrel (PLAVIX) 75 MG tablet 1/10/2022 at Unknown time  No Yes   Sig: Take 1 tablet (75 mg) by mouth daily   digoxin (LANOXIN) 125 MCG tablet 1/10/2022 at Unknown time  No Yes   Sig: Take 1 tablet (125 mcg)  by mouth daily   furosemide (LASIX) 20 MG tablet 1/10/2022 at AM  No Yes   Sig: Take 1 tablet (20 mg) by mouth daily AND 0.5 tablets (10 mg) daily (with lunch).   gabapentin (NEURONTIN) 300 MG capsule 1/9/2022 at Unknown time  No Yes   Sig: TAKE 2 CAPSULES BY MOUTH EVERY EVENING   melatonin 1 MG TABS tablet 1/9/2022 at Unknown time  Yes Yes   Sig: Take 1 mg by mouth nightly as needed for sleep   metoprolol succinate ER (TOPROL XL) 25 MG 24 hr tablet 1/10/2022 at Unknown time  No Yes   Sig: Take 1 tablet (25 mg) by mouth daily   mirtazapine (REMERON) 15 MG tablet 1/9/2022 at Unknown time  No Yes   Sig: Take 1 tablet (15 mg) by mouth At Bedtime   multivitamin (OCUVITE) TABS 1/10/2022 at Unknown time Self Yes Yes   Sig: Take 1 tablet by mouth daily    potassium chloride ER (K-TAB/KLOR-CON) 10 MEQ CR tablet 1/10/2022 at Unknown time  No Yes   Sig: Take 1 tablet (10 mEq) by mouth daily   senna-docusate (SENOKOT-S/PERICOLACE) 8.6-50 MG tablet Past Week at Unknown time  No Yes   Sig: Take 2 tablets by mouth 2 times daily   Patient taking differently: Take 2 tablets by mouth 2 times daily as needed for constipation       Facility-Administered Medications: None     Allergies   Allergies   Allergen Reactions     Sulfa Drugs Difficulty breathing, Swelling and Hives     Adhesive Tape Blisters     Amiodarone Other (See Comments)     Developed pleural effusion     Penicillins Other (See Comments)     Reaction occurred as a child  Other reaction(s): Hives       Physical Exam   Vital Signs: Temp: 97  F (36.1  C) Temp src: Temporal BP: 95/69 Pulse: 74   Resp: 23 SpO2: 97 % O2 Device: None (Room air)    Weight: 135 lbs 0 oz    General Appearance: Frail and chronically ill-appearing 76-year-old male sitting on [facility].  He does not appear toxic  Eyes: No scleral icterus or injection  HEENT: Patient with dentition intact.  Somewhat dry oral mucosa.  No posterior oropharyngeal exudate  Respiratory: Breath sounds with fine  crackles throughout, consistent with CT finding later obtained demonstrating pulmonary fibrosis.  Occasional rattling cough without production suggestive of bronchitis or bronchiectasis  Cardiovascular: Irregular rate and rhythm, rate currently in the 80 range.  Patient with a systolic murmur present.  Lymph/Hematologic: Trace bilateral lower extremity edema  Genitourinary: Not examined  Musculoskeletal: Diffuse muscular wasting and subcutaneous fat loss.  Advanced for age.  Patient is cachectic appearing  Neurologic: Alert, conversant, generally appropriate in conversation, though reports forgetfulness and has difficulty with time courses of events.  He is aware of his memory impairments.  Psychiatric: Very pleasant, normal affect    Data   Data reviewed today: I reviewed all medications, new labs and imaging results over the last 24 hours. I personally reviewed the chest CT image(s) showing Pulmonary fibrosis..    Recent Labs   Lab 01/10/22  1748   WBC 21.7*   HGB 13.5   *         POTASSIUM 3.7   CHLORIDE 100   CO2 27   BUN 25   CR 1.13   ANIONGAP 6   ULISSES 9.1   *

## 2022-01-11 NOTE — PROGRESS NOTES
[facility]      OUTPATIENT SPEECH LANGUAGE PATHOLOGY EVALUATION  PLAN OF TREATMENT FOR OUTPATIENT REHABILITATION  (COMPLETE FOR INITIAL CLAIMS ONLY)  Patient's Last Name, First Name, M.I.  YOB: 1945  Efrem Ramos                        Provider's Name  [facility] Medical Record No.  [id number]                               Onset Date:  01/10/22  Start of Care Date:      Type:     ___PT   ___OT   _X_SLP Medical Diagnosis:            SLP Diagnosis:  limited assessment; mod oral-pharyngeal dysphagia with ? esophageal dysphagia based on clinical assessment and hx  Visits from SOC:  1   ________________________________________________________________  Plan of Treatment/Functional Goals    Planned Interventions:   Dysphagia Treatment       Modified diet education,Instruction of safe swallow strategies,Compensatory strategies for swallowing,Oropharyngeal exercise training        Goals: See Speech Language Pathology Goals on Care Plan in [facility] electronic health record.    Therapy Frequency:5 times/wk   Predicted Duration of Therapy Intervention: x 1 week  ________________________________________________________________________________    I CERTIFY THE NEED FOR THESE SERVICES FURNISHED UNDER        THIS PLAN OF TREATMENT AND WHILE UNDER MY CARE     (Physician co-signature of this document indicates review and certification of the therapy plan).                       Referring Physician: Dr. Jonathan Franks           Initial Assessment        See Speech Language Pathology documentation in Epic electronic health record, evaluation dated

## 2022-01-11 NOTE — PHARMACY-ADMISSION MEDICATION HISTORY
Pharmacy Medication History  Admission medication history interview status for the 1/10/2022  admission is complete. See EPIC admission navigator for prior to admission medications     Location of Interview: Phone  Medication history sources: Patient and Patient's family/friend (son who manages medications), Surescripts    In the past week, patient estimated taking medication this percent of the time: greater than 90%    Medication reconciliation completed by provider prior to medication history? No    Time spent in this activity: 10 min    Prior to Admission medications    Medication Sig Last Dose Taking? Auth Provider   acetaminophen (TYLENOL) 325 MG tablet Take 325-650 mg by mouth every 6 hours as needed for mild pain  Past Month at Unknown time Yes Reported, Patient   apixaban ANTICOAGULANT (ELIQUIS) 5 MG tablet Take 1 tablet (5 mg) by mouth 2 times daily 1/10/2022 at AM Yes Cherise Raymond PA-C   atorvastatin (LIPITOR) 40 MG tablet Take 1 tablet (40 mg) by mouth daily 1/10/2022 at Unknown time Yes Severo Xiong MD   budesonide (ENTOCORT EC) 3 MG EC capsule 9 mg daily x 6 weeks, then 6 mg daily x 2 weeks, then 3 mg daily x 2 weeks 1/10/2022 at 3 mg Yes Danny Paige MD   calcium carbonate 600 mg-vitamin D 400 units (CALTRATE) 600-400 MG-UNIT per tablet Take 1 tablet by mouth 2 times daily  1/10/2022 at AM Yes Reported, Patient   clopidogrel (PLAVIX) 75 MG tablet Take 1 tablet (75 mg) by mouth daily 1/10/2022 at Unknown time Yes Tariq Cortes MD   digoxin (LANOXIN) 125 MCG tablet Take 1 tablet (125 mcg) by mouth daily 1/10/2022 at Unknown time Yes Cherise Raymond PA-C   furosemide (LASIX) 20 MG tablet Take 1 tablet (20 mg) by mouth daily AND 0.5 tablets (10 mg) daily (with lunch). 1/10/2022 at AM Yes Cherise Raymond PA-C   gabapentin (NEURONTIN) 300 MG capsule TAKE 2 CAPSULES BY MOUTH EVERY EVENING 1/9/2022 at Unknown time Yes Danny Paige MD    melatonin 1 MG TABS tablet Take 1 mg by mouth nightly as needed for sleep 1/9/2022 at Unknown time Yes Reported, Patient   metoprolol succinate ER (TOPROL XL) 25 MG 24 hr tablet Take 1 tablet (25 mg) by mouth daily 1/10/2022 at Unknown time Yes Danny Paige MD   mirtazapine (REMERON) 15 MG tablet Take 1 tablet (15 mg) by mouth At Bedtime 1/9/2022 at Unknown time Yes Danny Paige MD   multivitamin (OCUVITE) TABS Take 1 tablet by mouth daily  1/10/2022 at Unknown time Yes Unknown, Entered By History   potassium chloride ER (K-TAB/KLOR-CON) 10 MEQ CR tablet Take 1 tablet (10 mEq) by mouth daily 1/10/2022 at Unknown time Yes Cherise Raymond PA-C   senna-docusate (SENOKOT-S/PERICOLACE) 8.6-50 MG tablet Take 2 tablets by mouth 2 times daily  Patient taking differently: Take 2 tablets by mouth 2 times daily as needed for constipation  Past Week at Unknown time Yes Danny Paige MD       The information provided in this note is only as accurate as the sources available at the time of update(s)

## 2022-01-11 NOTE — PROGRESS NOTES
-diagnostic tests and consults completed and resulted: Not met at this time.    -vital signs normal or at patient baseline: Not met.  Soft b/p/   -returns to baseline functional status: not met at this time.  Patient with generalized weakness.  Patient with PT consult.   -safe disposition plan has been identified: Not met at this time.

## 2022-01-11 NOTE — PROGRESS NOTES
-diagnostic tests and consults completed and resulted: NOT MET  -vital signs normal or at patient baseline: MET   -returns to baseline functional status: NOT MET   -safe disposition plan has been identified: NOT MET     Nurse to notify provider when observation goals have been met and patient is ready for discharge.

## 2022-01-11 NOTE — PROGRESS NOTES
01/11/22 1145   Quick Adds   Type of Visit Initial PT Evaluation   Living Environment   People in home child(олег), adult   Current Living Arrangements house   Home Accessibility stairs to enter home;stairs within home   Number of Stairs, Main Entrance 2   Number of Stairs, Within Home, Primary 10   Transportation Anticipated family or friend will provide   Living Environment Comments 2 story home, hasn't driven in months   Self-Care   Usual Activity Tolerance moderate   Current Activity Tolerance fair   Regular Exercise   (not for 2 weeks but pedals in sitting)   Equipment Currently Used at Home   (walker outside of house, furniture inside)   Activity/Exercise/Self-Care Comment independent with ADLs, has been having increased fatigue on stairs and with activity   Disability/Function   Fall history within last six months yes   Number of times patient has fallen within last six months 6   Change in Functional Status Since Onset of Current Illness/Injury yes   General Information   Onset of Illness/Injury or Date of Surgery 01/10/22   Referring Physician Dr. Franks   Patient/Family Therapy Goals Statement (PT) to go home   Pertinent History of Current Problem (include personal factors and/or comorbidities that impact the POC) 76 year old male admitted on 1/10/2022. He presents to the emergency department after he was found to have an elevated white count at urgent care.  Was at urgent care and is he has been having increasing coughing, poor intake, and fatigue.  Reports that he has difficulty swallowing as he has trouble chewing his food.   Existing Precautions/Restrictions fall   Cognition   Orientation Status (Cognition) person;place;situation;time   Pain Assessment   Patient Currently in Pain Yes, see Vital Sign flowsheet   Posture    Posture Forward head position;Kyphosis   Range of Motion (ROM)   ROM Comment WFL   Strength   Strength Comments generalized weakness, antigravity and functional but weak   Bed  Mobility   Comment (Bed Mobility) uses bedrail supine to sit with HOB elevated slightly Ora   Transfers   Transfer Safety Comments sit to stand: cues and CGA from bed   Gait/Stairs (Locomotion)   Comment (Gait/Stairs) Pt ambulates with FWW and CGA with flexed posture, fatigue with minimal activtiy. See flowsheet   Balance   Balance Comments requires AD   Sensory Examination   Sensory Perception patient reports no sensory changes   Clinical Impression   Criteria for Skilled Therapeutic Intervention yes, treatment indicated   PT Diagnosis (PT) impaired gait   Influenced by the following impairments Decreased tolerance for activity, weakness, fall history,    Functional limitations due to impairments below baseline for bed mob, transfers, gait, endurance   Clinical Presentation Evolving/Changing   Clinical Presentation Rationale clnical judgment, unclear medical plan,   Clinical Decision Making (Complexity) moderate complexity   Therapy Frequency (PT) 5x/week   Predicted Duration of Therapy Intervention (days/wks) 3-5 days   Planned Therapy Interventions (PT) balance training;bed mobility training;gait training;home exercise program;patient/family education;strengthening;transfer training;progressive activity/exercise   Risk & Benefits of therapy have been explained evaluation/treatment results reviewed;care plan/treatment goals reviewed;risks/benefits reviewed;current/potential barriers reviewed;participants voiced agreement with care plan;patient   PT Discharge Planning    PT Discharge Recommendation (DC Rec) home with assist;home with home care physical therapy;Transitional Care Facility   PT Rationale for DC Rec Pt lives with son in a 2 story house and at baseline was independent with mobility and ADLs, using cane or furniture support and walker outside of home but with many falls. Pt has been getting progressively weak at home per pt. Currenrtly pt requires assist of 1 for bed mobility, trasnfers and very short  distance gait with walker. Anticiapte with continued medical treatment, pt will need to use walker at home and have asssit from son for ADLs and stairs and pt may need home PT. If pt does not have son available or able to assist, then pt may need TCU. PT's level of assist and needs will continue to be assessed.    PT Brief overview of current status  amb with FWW CGA and cues for 20ft.    Therapy Certification   Start of care date 01/11/22   Certification date from 01/11/22   Certification date to 01/16/22   Medical Diagnosis leukocytosis, dysphagia   Total Evaluation Time   Total Evaluation Time (Minutes) 10

## 2022-01-11 NOTE — UTILIZATION REVIEW
Admission Status; Secondary Review Determination         Under the authority of the Utilization Management Committee, the utilization review process indicated a secondary review on the above patient.  The review outcome is based on review of the medical records, discussions with staff, and applying clinical experience noted on the date of the review.        (x)      Inpatient Status Appropriate - This patient's medical care is consistent with medical management for inpatient care and reasonable inpatient medical practice.          RATIONALE FOR DETERMINATION    The patient is a 76-year-old male admitted to the hospital on 1/10/2022.  The patient was seen in urgent care due to increased coughing and poor oral intake and difficulty swallowing.  He was noted to have an elevated white count and was sent to the emergency room for evaluation.  White count was elevated and today is at 14.4.  Oncology consult was requested initial white count was 21,700.  Patient has a very complex set of significant medical problems.  In addition dysphagia is being evaluated and he received a swallow esophagram today.  There were severe tertiary contractions and esophageal dysmotility noted and recommendations for specific oral diet are being made by speech pathology.  Patient has pulmonary fibrosis.  Pulmonary consultation is pending.  Patient has other medical problems other being addressed.  Based on complexity and active issues that are requiring acute hospital level care, recommend switching from observation status to inpatient status.  An [employer] paging text was sent to Cee Thomason CNP to make this change or to call me to discuss.    The severity of illness, intensity of service provided, expected LOS and risk for adverse outcome make the care complex, high risk and appropriate for hospital admission.        The information on this document is developed by the utilization review team in order for the business office to ensure  compliance.  This only denotes the appropriateness of proper admission status and does not reflect the quality of care rendered.         The definitions of Inpatient Status and Observation Status used in making the determination above are those provided in the CMS Coverage Manual, Chapter 1 and Chapter 6, section 70.4.      Sincerely,     Morales Kelly MD  Physician Advisor  Utilization Review/ Case Management  [facility].

## 2022-01-11 NOTE — CONSULTS
Care Management Initial Consult    General Information  Assessment completed with: Patient,Children,  (Efrem (son))  Type of CM/SW Visit: Initial Assessment    Primary Care Provider verified and updated as needed: Yes   Readmission within the last 30 days: no previous admission in last 30 days      Reason for Consult: discharge planning  Advance Care Planning: Advance Care Planning Reviewed: other (comment) (No acp docs)          Communication Assessment  Patient's communication style: spoken language (English or Bilingual)    Hearing Difficulty or Deaf: no   Wear Glasses or Blind: yes    Cognitive  Cognitive/Neuro/Behavioral: WDL                      Living Environment:   People in home: child(олег), adult   (Efrem)  Current living Arrangements: house      Able to return to prior arrangements: yes  Living Arrangement Comments:  (TCU is recommended v home with assist)    Family/Social Support:  Care provided by: self,child(олег)  Provides care for: no one  Marital Status:   Children          Description of Support System: Supportive,Involved    Support Assessment: Adequate family and caregiver support,Adequate social supports    Current Resources:   Patient receiving home care services: No     Community Resources: None  Equipment currently used at home: cane, straight  Supplies currently used at home: None    Employment/Financial:  Employment Status: retired        Financial Concerns:             Lifestyle & Psychosocial Needs:  Social Determinants of Health     Tobacco Use: Medium Risk     Smoking Tobacco Use: Former Smoker     Smokeless Tobacco Use: Never Used   Alcohol Use: Not on file   Financial Resource Strain: Not on file   Food Insecurity: Not on file   Transportation Needs: Not on file   Physical Activity: Not on file   Stress: Not on file   Social Connections: Not on file   Intimate Partner Violence: Not on file   Depression: Not at risk     PHQ-2 Score: 0   Housing Stability: Not on file        Functional Status:  Prior to admission patient needed assistance:              Mental Health Status:          Chemical Dependency Status:                Values/Beliefs:  Spiritual, Cultural Beliefs, [employer] Practices, Values that affect care: no               Additional Information:  Per care management/social work consult for discharge planning, patient was admitted with pneumonia. Tentative date of discharge is 1/12. Reviewed PT notes and PT is recommending TCU v. Home with assist. Talked to patient and Efrem (son) about discharge planning. Patient said that he lives with his son in a home. He said that he uses a cane when needed. He sleeps upstairs, but his son helps him get upstairs. He said that his son prepares his meals for him. Mentioned that PT recommends TCU and patient said he doesn't want to go to a TCU. Son said that he thinks he could assist with patient if needed. Efrem (son) asked for writer to check in tomorrow on what they decide.    Will continue to follow.    THEO Woodard

## 2022-01-11 NOTE — PROGRESS NOTES
RECEIVING UNIT ED HANDOFF REVIEW    ED Nurse Handoff Report was reviewed by: Do Duke RN on January 11, 2022 at 9:06 AM

## 2022-01-11 NOTE — PROGRESS NOTES
SUBJECTIVE: Efrem Ramos is a 76 year old male presenting with a chief complaint of not eating/drinking and sleeping throughout the day.  Onset of symptoms was 2 week(s) ago.  Course of illness is worsening.    Severity moderately severe  Current and Associated symptoms: cough but has COPD  Treatment measures tried include None tried.  Predisposing factors include HX of COPD.    Past Medical History:   Diagnosis Date     Atrial fibrillation (H)     amiodarone therapy discontinued due to pulmonary toxicity     Atrial flutter (H)      Benign essential hypertension 11/20/2018     Cancer (H) vocal cord     Carpal tunnel syndrome     abstracted 7/3/02.     Coronary artery disease involving native coronary artery of native heart without angina pectoris 5/8/2020    Cath 5/28/2020: patent stents; Cath 5/18/2020: ISABEL x4 to RCA; Cath 3/2020: 1 vessel disease; Cath 2017: moderate 2 vessel disease     CVA (cerebral infarction) 5/5/2015     Demyelinating disease of central nervous system, unspecified (H)     abstracted 7/3/02.     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     Pneumonia 8/17/2016     PVD (peripheral vascular disease) (H)      Redundant colon     needs CT colonography     Shingles      SKIN DISORDERS NEC 3/15/2005     Sleep apnea      Sleep apnea      SVT (supraventricular tachycardia) (H)       Allergies   Allergen Reactions     Sulfa Drugs Difficulty breathing, Swelling and Hives     Adhesive Tape Blisters     Amiodarone Other (See Comments)     Developed pleural effusion     Penicillins Other (See Comments)     Reaction occurred as a child  Other reaction(s): Hives     Social History     Tobacco Use     Smoking status: Former Smoker     Packs/day: 1.00     Years: 30.00     Pack years: 30.00     Types: Cigarettes     Quit date: 2013     Years since quittin.4     Smokeless tobacco: Never Used   Substance Use Topics     Alcohol use: Not Currently     Alcohol/week: 42.0 standard drinks     Types: 42 Standard drinks or equivalent per week       ROS:  SKIN: no rash  GI: no vomiting    OBJECTIVE:  /60 (BP Location: Right arm, Patient Position: Chair, Cuff Size: Adult Regular)   Pulse 100   Resp 16   Wt 60.1 kg (132 lb 9.6 oz)   SpO2 97%   BMI 20.17 kg/m  GENERAL APPEARANCE: healthy, alert and no distress  EYES: EOMI,  PERRL, conjunctiva clear  HENT: ear canals and TM's normal.  Nose and mouth without ulcers, erythema or lesions  RESP: lungs clear to auscultation - no rales, rhonchi or wheezes  CV: regular rates and rhythm, normal S1 S2, no murmur noted  ABDOMEN:  soft, nontender, no HSM or masses and bowel sounds normal  SKIN: no suspicious lesions or rashes      ICD-10-CM    1. Other fatigue  R53.83 UA reflex to Microscopic and Culture     CBC with Platelets & Differential     Basic metabolic panel     Symptomatic; Yes; 1/3/2022 COVID-19 Virus (Coronavirus) by PCR   2. Cough  R05.9 UA reflex to Microscopic and Culture     CBC with Platelets & Differential     Basic metabolic panel     Symptomatic; Yes; 1/3/2022 COVID-19 Virus (Coronavirus) by PCR   3. Leukocytosis, unspecified type  D72.829      Pt not thriving at home, will go to ED for w/u since wbc 21

## 2022-01-11 NOTE — PROGRESS NOTES
[facility]    Medicine Progress Note - Hospitalist Service       Date of Admission:  1/10/2022    Assessment & Plan        Efrem Ramos is a 76 year old male admitted on 1/10/2022 with leukocytosis, increasing coughing, poor intake, fatigue and difficulty swallowing as he has trouble chewing his food.    Leukocytosis  Macrocytosis at admit: Patient has had some increasing memory impairment since his wife's death in September.  This led to urgent care evaluation where he was found to have leukocytosis and subsequently referred to the emergency department.  White count of 21.7 on admit with neutrophilic predominance, though no fevers, no chills.  Undetectable procalcitonin.  History of vocal fold malignancy.  UA, flu, covid, all neg.  Possible pneumonitis.  Normal lactic.  No clear infectious process.    -Monitor off of anti-infectives.  No clear infectious etiology with undetectable procalcitonin at this time.  -Trend CBC (improving)  -Oncology consult as below; some concern for developing lymphoproliferative disorder in the setting of underlying MGUS  -Peripheral blood morphology unrevealing  -Folgard tablet daily given history of B12 deficiency  -recheck CBC in am    Dysphagia: Patient reports that he has difficulty chewing secondary to his dentition.  He especially struggles with fibrous foods as he feels he cannot chew them fully.  Does have a history of esophageal stricture requiring dilation in the past.  Has been restricting his oral intake for fear of coughing and choking.   Patient underwent last endoscopy with esophageal stricture in 2018.  Note that he has multiple significant comorbid factors including malnutrition, pulmonary fibrosis, severe coronary artery disease and aortic stenosis as well as chronic valvular heart failure.  Generally a high risk procedural candidate.  -Speech pathology rec video swallow 1/12/22 post esophagram  -[facility] consulted, rec  esophogram-->Severe tertiary contractions and esophageal dysmotility. No definite fixed strictures demonstrated. .   -while pending video swall, advance diet as tolerated to a pureed and mildly thickened liquids    Pulmonary fibrosis: Possibly related to historical amiodarone toxicity?  Uncertain if related to patient's cough currently.  This has been noted previously on CT imaging in February 2020.  Patient also has a history of organizing pneumonia by biopsy in 2016.  He has no fevers, no chills, no hypoxia.  Cough is suggestive of bronchitis versus ongoing issues with dysphagia.  -CT chest without contrast performed in the setting of possible right upper pneumonia and leukocytosis absent infectious symptoms.  Pulmonary fibrosis noted  -Pulmonary consulted  -Sputum culture if able; patient has not had a productive cough, however    Severe valvular heart disease    severe aortic stenosis with aortic valve area of 0.8 cm  severe tricuspid regurgitation w/ resultant   chronic valvular heart failure, which currently appears to be compensated.  Most recent echocardiogram December 6, 2021 with preserved LVEF, moderately dilated right ventricle.  Patient is followed by cardiology, recently discussed at TAVR conference.  Could potentially be a candidate for high risk TAVR using transcaval approach by recent telephone encounter, though this appears to be in the setting of if patient was thought to benefit from back surgery.  -Repeat TTE     Severe coronary artery disease: Rotational arthrectomy and drug-eluting stent x4 to proximal to distal RCA in May 2020.  Complicated by left femoral artery bleed with retroperitoneal and scrotal hematoma.  -Continue Plavix  -Continue prior to admission Eliquis  -Continue prior to admission atorvastatin 40 mg daily   -continue metoprolol XL 25 mg daily    Persistent atrial fibrillation: Severe biatrial enlargement On prior echocardiogram.  -Continue prior to admission Eliquis;  "[id number] of 6  -Continue prior to admission digoxin  -Cardiac telemetry  -Continue metoprolol XL 25 mg daily    Moderate to severe protein calorie nutrition: Reports 8 pound weight loss in the past week.  Dysphagia with coughing and choking has caused him to restrict his intake, and this seems the most likely cause of his weight loss and malnutrition.  Consider also cardiac wasting in the setting of known severe valvular heart disease.  -Nutrition consulted  -Speech pathology consulted as above   -Daily weights  -Continue Remeron 15 mg at bedtime  -dietician consult    Monoclonal gammopathy of uncertain significance: No known lytic lesions.  Increasing mediastinal lymphadenopathy noted on CT imaging.  Note also patient with occult blood positivity in 2021, though this is in the setting of suspected collagenous colitis.  -Oncology consulted as significant risk for lymphoproliferative disorder developing defer flow cytometry or recommendation regarding mediastinal lymphadenopathy biopsy to oncology team.  If needed, this could potentially be coordinated with gastroenterology service via EUS    Collagenous colitis  -Continue budesonide 3 mg daily   -Gastroenterology consult as above    Fall, weakness  -consult PT    Goals of treatment: While it is my opinion that patient is not actively dying he has many chronic and complex illnesses that certainly may limit his life expectancy and if he  in the next 6 months I would not be surprised.  Patient seems to be making decisions so that his son \"does not lose him\" following the death of his spouse in 2021.  I think a palliative consultation, if son is okay with that her, may help work through some of the likely grief that the patient and his son are experiencing after the death of patient's spous may help clarify patient's current goals of treatment.  I am quite concerned that if he were to arrest he would not survive ACLS resuscitation.  I did " "share this concern with the patient.  I will speak with the son about palliative consultation       Diet: Pureed Diet (level 4) Mildly Thick (level 2)  Snacks/Supplements Adult: Other; Chocolate Vital Cuisine daily at 2 pm; Between Meals    DVT Prophylaxis: Pneumatic Compression Devices, DOAC   Bravo Catheter: Not present  Central Lines: None  Code Status: Full Code      Disposition Plan   Expected Discharge:  To be determined  Anticipated discharge location: home with help/services    Delays: none anticipated       The patient's care was discussed with the Attending Physician, Dr. Shawn Farley and Bedside Nurse.    FABI Roth [facility]  Hospitalist Service  [facility]  Securely message with the Vocera Web Console (learn more here)  Text page via [employer] Paging/Directory    Total time is 28 minutes of which 18 minutes was spent at the bedside remainder spent in the counseling coordination of care    Clinically Significant Risk Factors Present on Admission             # Coagulation Defect: home medication list includes an anticoagulant medication  # Platelet Defect: home medication list includes an antiplatelet medication       ______________________________________________________________________    Interval History   Patient reports fatigue supportively.  He is concerned about \"what direction were going\".  He is hopeful to start a diet to help him gain or maintain his weight that does not exhaust him.  He feels a little short of breath.  There is some mild chest pain with coughing.  In general he does not feel he is suffering.  He is generally worried about overall decline in his health status.  He cannot really identify any life goals or hopes.  He thinks his son would not want to lose him as patient's spouse  in 2021.    Data reviewed today: I reviewed all medications, new labs and imaging results over the last 24 hours. I personally reviewed no images or EKG's " today.    Physical Exam   Vital Signs: Temp: (P) 97.7  F (36.5  C) Temp src: (P) Oral BP: (P) 92/61 Pulse: (P) 91   Resp: (P) 18 SpO2: (P) 97 % O2 Device: (P) None (Room air)    Weight: 131 lbs 3.2 oz     Constitutional: vs as per EMR  General:  adult pt lying in bed without acute distress  Neuro: +follows commands wiggle toes and show 2 fingers bilat, face symmetric, tongue midline, speech fluent, oriented x3  Eyes pupils equal round 3mm briskly reactive bilat, sclera nonicteric, noninjected, conjunctiva pink,  Head, ENT & mouth: NC/AT, dry oral mucosa  Neck: supple  CV S1S2, no murmurs  resp: CTAB upper and lower lobes, mildly tachypneic, respiratory rate 24  gi:normoactive bowel sounds, soft, nontender, nondisteded  Ext: no edema  Skin: no rashes on exposed skin  Lymph: defer  Musculoskeletal no bony joint deformities    Data   Recent Labs   Lab 01/11/22  0917 01/10/22  1748   WBC 14.4* 21.7*   HGB 12.0* 13.5   MCV 98 103*    422   NA  --  133   POTASSIUM  --  3.7   CHLORIDE  --  100   CO2  --  27   BUN  --  25   CR  --  1.13   ANIONGAP  --  6   ULISSES  --  9.1   GLC  --  130*     Recent Results (from the past 24 hour(s))   XR Chest Port 1 View    Narrative    EXAM: XR CHEST PORT 1 VIEW  LOCATION: [facility]  DATE/TIME: 1/10/2022 9:58 PM    INDICATION: 2 weeks malaise, weakness, anorexia, cough.  COMPARISON: 6/2/2020.      Impression    IMPRESSION: Normal heart size and pulmonary vascularity. There is some asymmetric interstitial prominence in the right upper lung with a mild interstitial pneumonitis not excluded. Clinical correlation. Left lung is clear. Small right pleural effusion.   Prominent mitral annular calcification. Remainder unremarkable.   CT Head w/o Contrast    Narrative    EXAM: CT HEAD W/O CONTRAST  LOCATION: [facility]  DATE/TIME: 1/11/2022 12:00 AM    INDICATION: Weakness, fatigue, h/o demyelinating disease  COMPARISON:  05/29/2020  TECHNIQUE: Routine CT Head without IV contrast. Multiplanar reformats. Dose reduction techniques were used.    FINDINGS:  INTRACRANIAL CONTENTS: No intracranial hemorrhage, extraaxial collection, or mass effect.  No CT evidence of acute infarct. Stable low-attenuation changes in the supratentorial white matter which may reflect a combination of chronic demyelinating lesions   and chronic small vessel ischemic changes. Mild to moderate generalized volume loss. No hydrocephalus. Redemonstration of punctate foci of mineralization in the left parietal lobe.    VISUALIZED ORBITS/SINUSES/MASTOIDS: No intraorbital abnormality. No paranasal sinus mucosal disease. No middle ear or mastoid effusion.    BONES/SOFT TISSUES: No acute abnormality. Left frontal bone tim hole.      Impression    IMPRESSION:  1.  No acute intracranial pathology.  2.  Stable chronic changes as above.   CT Chest w/o Contrast    Narrative    EXAM: CT CHEST W/O CONTRAST  LOCATION: [facility]  DATE/TIME: 1/11/2022 2:55 AM    INDICATION: Cough, persistent  COMPARISON: 01/10/2019  TECHNIQUE: CT chest without IV contrast. Multiplanar reformats were obtained. Dose reduction techniques were used.  CONTRAST: None.    FINDINGS:   LUNGS AND PLEURA: Increased interstitial consolidation, seen on a background of moderate emphysema worsening moderate bronchiectasis. Increased fibrotic changes with areas of honeycombing. No pneumothorax or pulmonary mass.    MEDIASTINUM/AXILLAE: Mildly enlarged heart. No pericardial effusion. Severe coronary artery disease. Severe mitral annular calcifications.    Increased mediastinal lymphadenopathy. Subcarinal lymph nodes measuring up to 1.7 cm in short axis diameter. 11 mm right lowest paratracheal lymph node.    CORONARY ARTERY CALCIFICATION: Severe.    UPPER ABDOMEN: Cholelithiasis.    MUSCULOSKELETAL: Diffuse osteopenia. Compression deformities of T11, 12, and L1 with L1 vertebroplasty  cement. Left nondisplaced rib fractures.      Impression    IMPRESSION:   1.  Progressed emphysema.   2.  Progressed fibrotic changes with bronchiectasis and increased interstitial consolidation consistent with underlying usual interstitial pneumonitis, correlate to exclude superimposed infection.  3.  Nonspecific, increasing mediastinal lymphadenopathy, correlate to exclude underlying malignancy or lymphoproliferative cause. No discrete pulmonary mass.  4.  Mildly enlarged heart with coronary artery disease and atherosclerotic vascular disease.     XR Esophagram    Narrative    ESOPHAGRAM  1/11/2022 3:27 PM     HISTORY: Dysphagia.    COMPARISON: None.    TECHNIQUE: A single contrast esophagram was performed.     FLUOROSCOPY TIME: 1.2 minutes.    SPOT FILMS: 6      Impression    IMPRESSION: Severe tertiary contractions and esophageal dysmotility.  No definite fixed strictures demonstrated.     Medications       apixaban ANTICOAGULANT  5 mg Oral BID     atorvastatin  40 mg Oral Daily     budesonide  3 mg Oral Daily     clopidogrel  75 mg Oral Daily     digoxin  125 mcg Oral Daily     folic acid-vit B6-vit B12  1 tablet Oral Daily     gabapentin  300 mg Oral At Bedtime     metoprolol succinate ER  25 mg Oral Daily     mirtazapine  15 mg Oral At Bedtime     sodium chloride (PF)  3 mL Intracatheter Q8H

## 2022-01-11 NOTE — PROGRESS NOTES
[facility]      OUTPATIENT PHYSICAL THERAPY EVALUATION  PLAN OF TREATMENT FOR OUTPATIENT REHABILITATION  (COMPLETE FOR INITIAL CLAIMS ONLY)  Patient's Last Name, First Name, M.I.  YOB: 1945  Efrem Ramos                        Provider's Name  [facility] Medical Record No.  [id number]                               Onset Date:  01/10/22   Start of Care Date:  01/11/22      Type:     _X_PT   ___OT   ___SLP Medical Diagnosis:  leukocytosis, dysphagia                        PT Diagnosis:  impaired gait   Visits from SOC:  1   _________________________________________________________________________________  Plan of Treatment/Functional Goals    Planned Interventions: balance training,bed mobility training,gait training,home exercise program,patient/family education,strengthening,transfer training,progressive activity/exercise     Goals: See Physical Therapy Goals on Care Plan in eigital electronic health record.    Therapy Frequency: 5x/week  Predicted Duration of Therapy Intervention: 3-5 days  _________________________________________________________________________________    I CERTIFY THE NEED FOR THESE SERVICES FURNISHED UNDER        THIS PLAN OF TREATMENT AND WHILE UNDER MY CARE     (Physician co-signature of this document indicates review and certification of the therapy plan).                Certification date from: 01/11/22, Certification date to: 01/16/22    Referring Physician: Dr. Franks            Initial Assessment        See Physical Therapy evaluation dated 01/11/22 in Epic electronic health record.

## 2022-01-11 NOTE — PROGRESS NOTES
"   Clinical Bedside Swallow Evaluation  [facility] (OBS)  01/11/22 1110   General Information   Onset of Illness/Injury or Date of Surgery 01/10/22   Referring Physician Dr. Jonathan Franks   Patient/Family Therapy Goal Statement (SLP) Not stated   Pertinent History of Current Problem From clinical swallow evaluation notes 5/29/2020: Efrem Ramos is a 74 year old male with past medical history significant for MEL, PVD, HLD, CVA 2015, atrial fibrillation, severe aortic stenosis and mild to moderate mitral valve stenosis admitted on 5/18/2020. He underwent and elective cardiac catheterization and RCA intervention prior to TAVR. 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 Latest VFSS in 2018 found oropharyngeal and sx of esophageal dysphagia with cricopharyngeal prominence. EG at this time also found Benign-appearing esophageal stenosis and ?esophageal spasm. dysphagia diet level 2 with thin liquids with CHIN TUCK and DOUBLE SWALLOW, eating only when alert and up to chair was recommended at that time.   General Observations GI note 1/11/2022: HPI:  Efrem Ramos is a 76 year old male with a past medical history of CVA, pneumonia, pulmonary fibrosis, CAD, esophageal stricture who presents with several weeks of fatigue, 8 lb weight loss, increasing memory loss since wife's death in September.  Patient presented initially to the urgent care due to these symptoms. Blood work revealed an elevated WBC count and he was sent to the ED.  Patient describes significant difficulty with chewing food due to poor dentition.  He also frequently feels as though food has difficulty with passing " down and develops choking and coughing episodes.  He denies monica esophageal dysphagia and does not feel food get stuck in his throat or develop chest pain or discomfort while eating. Chest x-ray shows asymmetric interstitial prominence in the right upper lung with a mild interstitial prominence in the right upper lung with pneumonitis not excluded.  CT Head showed no acute intracranial pathology.  Follow up CT Chest show progressed emphysema, progressed fibrotic changes with bronchiectasis and increased interstitial consolidation consistent with underlying interstitial pneumonitis.  Nonspecific, increasing mediastinal lymphadenopathy.  No discrete pulmonary mass. Patient reports respiratory illness 3 weeks ago which has now resolved.  Denies fever, chills.  Previous biopsy diagnosed pneumonia in 2016 with pulmonary fibrosis on 2/2020 imaging. As above, patient has a history of esophageal stricture with EGD in 2018 with benign-appearing esophageal stenosis with minimal luminal narrowing and unlikely cause of swallowing difficulty.  VFSS 5/2020 with no aspiration but some penetration.  Similar findings on 9/2018 VFSS.  COVID-19 and influenza negative. Plan esophagram, possible EGD pending results.   Past History of Dysphagia 2015 OP VFSS: WFL, no further SLP, regular diet, thin liquids. 9/2018 Clinical eval, VFSS, mod dysphagia, DD2 with thin, chin tuck, double swallow. Noted pill retention in distal esophagus. 5/2020. Clinical and VFSS; mod dysphagia, multiple swallows to clear pharyngeal retention, puree with thin liquids with strategies.    Type of Evaluation   Type of Evaluation Swallow Evaluation   Oral Motor   Oral Musculature generally intact   Mucosal Quality good   Dentition (Oral Motor)   Dentition (Oral Motor) natural dentition;some missing teeth;dentition impacts chewing ability  (pt describes inability to adequately chew most solids)   Facial Symmetry (Oral Motor)   Comment, Facial Symmetry (Oral Motor)  "WFL   Lip Function (Oral Motor)   Comment, Lip Function (Oral Motor) WFL; generalized weakness, incoordination   Tongue Function (Oral Motor)   Comment, Tongue Function (Oral Motor) WFL; generalized weakness, incoordination   Jaw Function (Oral Motor)   Comment, Jaw Function (Oral Motor) WFL   Cough/Swallow/Gag Reflex (Oral Motor)   Volitional Swallow (Oral Motor) mildly delayed;effortful;weak  (reduced elevation ? incomplete; improved on 2nd attempt)   Vocal Quality/Secretion Management (Oral Motor)   Vocal Quality (Oral Motor) hoarse  (weak, quiet)   Secretion Management (Oral Motor) WNL   General Swallowing Observations   Current Diet/Method of Nutritional Intake (General Swallowing Observations, NIS) regular diet;mildly thick liquids (level 2)   Respiratory Support (General Swallowing Observations) none   Comment, General Swallowing Observations Pt notes chronic nature of dysphagia; worsening recently with significant effort to chew most solids. Effortful initiation of pharyngeal swallow. No reported coughing with thin liquids. Drinking 1-2 boost per day which goes \"well\".    Swallowing Evaluation Clinical swallow evaluation   Clinical Swallow Evaluation   Feeding Assistance minimal assistance required   Clinical Swallow Evaluation Textures Trialed   (no PO trials given; Pt NPO for esophagram this PM)   Esophageal Phase of Swallow   Patient reports or presents with symptoms of esophageal dysphagia Yes   Swallowing Recommendations   Diet Consistency Recommendations pureed (level 4);mildly thick liquids (level 2)   Supervision Level for Intake distant supervision needed   Mode of Delivery Recommendations bolus size, small;no straws;slow rate of intake   Swallowing Maneuver Recommendations alternate food and liquid intake;extra swallow  (2-3 swallows to clear pharyngeal residue per VFSS 2020)   Monitoring/Assistance Required (Eating/Swallowing) monitor for cough or change in vocal quality with intake;stop eating " activities when fatigue is present   Recommended Feeding/Eating Techniques (Swallow Eval) minimize distractions during oral intake;provide 6 smaller meals throughout day;maintain upright sitting position for eating;maintain upright posture during/after eating for 30 minutes   Medication Administration Recommendations, Swallowing (SLP) whole with mildly thick liquids as able   Instrumental Assessment Recommendations VFSS (videofluroscopic swallowing study)   Comment, Swallowing Recommendations Video swallow ordered 1/12/2022. Esophagram today.   General Therapy Interventions   Planned Therapy Interventions Dysphagia Treatment   Dysphagia treatment Modified diet education;Instruction of safe swallow strategies;Compensatory strategies for swallowing;Oropharyngeal exercise training   SLP Therapy Assessment/Plan   Criteria for Skilled Therapeutic Interventions Met (SLP Eval) yes;treatment indicated   SLP Diagnosis limited assessment; mod oral-pharyngeal dysphagia with ? esophageal dysphagia based on clinical assessment and hx   Rehab Potential (SLP Eval) good, to achieve stated therapy goals   Therapy Frequency (SLP Eval) 5 times/wk   Predicted Duration of Therapy Intervention (SLP Eval) x 1 week   Therapy Plan Review/Discharge Plan (SLP)   Therapy Plan Review (SLP) evaluation/treatment results reviewed;care plan/treatment goals reviewed;risks/benefits reviewed;current/potential barriers reviewed;participants voiced agreement with care plan;participants included;patient   SLP Discharge Planning    SLP Discharge Recommendation (DC Rec) home with home care speech therapy;home with outpatient speech therapy   SLP Rationale for DC Rec Pt with chronic dysphagia; worsening recently with weight loss, inability to take PO   SLP Brief overview of current status  SLP Limited bedside evaluation completed. Pt NPO for esophagram this afternoon. No PO trials given. Modified diet given pt hx and expressed difficulty chewing impacting  intake. Recommend puree (IDDSI level 4) with mildly thick liquids (IDDSI level 2). SLP to cont 5x/wk per POC. Will tentatively schedule video swallow for tomorrow 1/12/2022; discussed with RN who will coordinate esophagram with radiology for today.    Total Evaluation Time   Total Evaluation Time (Minutes) 20

## 2022-01-11 NOTE — CONSULTS
[facility]  Gastroenterology Consultation    Efrem Ramos  [address] [facility] [address]  76 year old male    Admission Date/Time: 1/10/2022  Primary Care Provider: Danny Paige    We were asked to see the patient in consultation by Dr. Franks for evaluation of dysphagia, weight loss.        HPI:  Efrem Ramos is a 76 year old male with a past medical history of CVA, pneumonia, pulmonary fibrosis, CAD, esophageal stricture who presents with several weeks of fatigue, 8 lb weight loss, increasing memory loss since wife's death in September.      Patient presented initially to the urgent care due to these symptoms. Blood work revealed an elevated WBC count and he was sent to the ED.  Patient describes significant difficulty with chewing food due to poor dentition.  He also frequently feels as though food has difficulty with passing down and develops choking and coughing episodes.  He denies monica esophageal dysphagia and does not feel food get stuck in his throat or develop chest pain or discomfort while eating.    Chest x-ray shows asymmetric interstitial prominence in the right upper lung with a mild interstitial prominence in the right upper lung with pneumonitis not excluded.  CT Head showed no acute intracranial pathology.  Follow up CT Chest show progressed emphysema, progressed fibrotic changes with bronchiectasis and increased interstitial consolidation consistent with underlying interstitial pneumonitis.  Nonspecific, increasing mediastinal lymphadenopathy.  No discrete pulmonary mass.      Patient reports respiratory illness 3 weeks ago which has now resolved.  Denies fever, chills.  Previous biopsy diagnosed pneumonia in 2016 with pulmonary fibrosis on 2/2020 imaging.       As above, patient has a history of esophageal stricture with EGD in 2018 with benign-appearing esophageal stenosis with minimal luminal narrowing and unlikely cause of swallowing  difficulty.  VFSS 5/2020 with no aspiration but some penetration.  Similar findings on 9/2018 VFSS.      COVID-19 and influenza negative.    ROS: A comprehensive ten point review of systems was negative aside from those in mentioned in the HPI.      MEDICATIONS: No current facility-administered medications on file prior to encounter.  acetaminophen (TYLENOL) 325 MG tablet, Take 325-650 mg by mouth every 6 hours as needed for mild pain   apixaban ANTICOAGULANT (ELIQUIS) 5 MG tablet, Take 1 tablet (5 mg) by mouth 2 times daily  atorvastatin (LIPITOR) 40 MG tablet, Take 1 tablet (40 mg) by mouth daily  budesonide (ENTOCORT EC) 3 MG EC capsule, 9 mg daily x 6 weeks, then 6 mg daily x 2 weeks, then 3 mg daily x 2 weeks  calcium carbonate 600 mg-vitamin D 400 units (CALTRATE) 600-400 MG-UNIT per tablet, Take 1 tablet by mouth 2 times daily   clopidogrel (PLAVIX) 75 MG tablet, Take 1 tablet (75 mg) by mouth daily  digoxin (LANOXIN) 125 MCG tablet, Take 1 tablet (125 mcg) by mouth daily  furosemide (LASIX) 20 MG tablet, Take 1 tablet (20 mg) by mouth daily AND 0.5 tablets (10 mg) daily (with lunch).  gabapentin (NEURONTIN) 300 MG capsule, TAKE 2 CAPSULES BY MOUTH EVERY EVENING  melatonin 1 MG TABS tablet, Take 1 mg by mouth nightly as needed for sleep  metoprolol succinate ER (TOPROL XL) 25 MG 24 hr tablet, Take 1 tablet (25 mg) by mouth daily  mirtazapine (REMERON) 15 MG tablet, Take 1 tablet (15 mg) by mouth At Bedtime  multivitamin (OCUVITE) TABS, Take 1 tablet by mouth daily   potassium chloride ER (K-TAB/KLOR-CON) 10 MEQ CR tablet, Take 1 tablet (10 mEq) by mouth daily  senna-docusate (SENOKOT-S/PERICOLACE) 8.6-50 MG tablet, Take 2 tablets by mouth 2 times daily (Patient taking differently: Take 2 tablets by mouth 2 times daily as needed for constipation )        ALLERGIES:   Allergies   Allergen Reactions     Sulfa Drugs Difficulty breathing, Swelling and Hives     Adhesive Tape Blisters     Amiodarone Other (See  Comments)     Developed pleural effusion     Penicillins Other (See Comments)     Reaction occurred as a child  Other reaction(s): Hives       Past Medical History:   Diagnosis Date     Atrial fibrillation (H)     amiodarone therapy discontinued due to pulmonary toxicity     Atrial flutter (H)      Benign essential hypertension 11/20/2018     Cancer (H) vocal cord     Carpal tunnel syndrome     abstracted 7/3/02.     Coronary artery disease involving native coronary artery of native heart without angina pectoris 5/8/2020    Cath 5/28/2020: patent stents; Cath 5/18/2020: ISABEL x4 to RCA; Cath 3/2020: 1 vessel disease; Cath 2017: moderate 2 vessel disease     CVA (cerebral infarction) 5/5/2015     Demyelinating disease of central nervous system, unspecified (H)     abstracted 7/3/02.     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     Pneumonia 8/17/2016     PVD (peripheral vascular disease) (H)      Redundant colon     needs CT colonography     Shingles      SKIN DISORDERS NEC 3/15/2005     Sleep apnea      Sleep apnea      SVT (supraventricular tachycardia) (H)        Past Surgical History:   Procedure Laterality Date     BIOPSY  brain 2002     BONE MARROW BIOPSY, BONE SPECIMEN, NEEDLE/TROCAR N/A 6/8/2017    Procedure: BIOPSY BONE MARROW;  UNILATERAL BONE MARROW BIOPSY (CONSCIOUS SEDATION) ;  Surgeon:  Jamie Gonzales MD;  Location:  GI     CARDIAC CATHERIZATION  09/05/2017    2V CAD, IFR of RCA 0.95     CV CORONARY ANGIOGRAM N/A 3/23/2020    Procedure: Coronary Angiogram and right heart cath;  Surgeon: Gino Ferrer MD;  Location:  HEART CARDIAC CATH LAB     CV HEART CATHETERIZATION WITH POSSIBLE INTERVENTION N/A 5/28/2020    Procedure: Heart Catheterization with Possible Intervention;  Surgeon: Larry Chawla MD;  Location:  HEART CARDIAC CATH LAB     CV HEART CATHETERIZATION WITH POSSIBLE INTERVENTION N/A 5/18/2020    Procedure: Heart Catheterization with Possible Intervention;  Surgeon: Gaurang Swenson MD;  Location:  HEART CARDIAC CATH LAB     CV INSTANTANEOUS WAVE-FREE RATIO N/A 3/23/2020    Procedure: Instantaneous Wave-Free Ratio;  Surgeon: Gino Ferrer MD;  Location:  HEART CARDIAC CATH LAB     CV PCI ATHERECTOMY ORBITAL N/A 5/18/2020    Procedure: Percutaneous Coronary Intervention Atherectomy Rotational;  Surgeon: Gaurang Swenson MD;  Location:  HEART CARDIAC CATH LAB     CV PCI STENT DRUG ELUTING N/A 5/18/2020    Procedure: Percutaneous Coronary Intervention Stent Drug Eluting;  Surgeon: Gaurang Swenson MD;  Location:  HEART CARDIAC CATH LAB     CV RIGHT HEART CATH MEASUREMENTS RECORDED N/A 5/28/2020    Procedure: Right Heart Cath;  Surgeon: Larry Chawla MD;  Location:  HEART CARDIAC CATH LAB     CV TEMPORARY PACEMAKER INSERTION N/A 5/18/2020    Procedure: Temporary Pacemaker Insertion;  Surgeon: Gaurang Swenson MD;  Location:  HEART CARDIAC CATH LAB     ESOPHAGOSCOPY, GASTROSCOPY, DUODENOSCOPY (EGD), COMBINED N/A 9/8/2018    Procedure: COMBINED ESOPHAGOSCOPY, GASTROSCOPY, DUODENOSCOPY (EGD), BIOPSY SINGLE OR MULTIPLE;;  Surgeon: Xander Marie MD;  Location:  GI     HEAD & NECK SURGERY       ORTHOPEDIC SURGERY      right arm ulna reset after injury     THORACOSCOPIC WEDGE RESECTION LUNG Right 8/2/2016     "Procedure: THORACOSCOPIC WEDGE RESECTION LUNG;  Surgeon: Abdelrahman Noriega MD;  Location:  OR     [facility] NONSPECIFIC PROCEDURE  as a child    T & A. abstracted 7/3/02.     Z NONSPECIFIC PROCEDURE  early    CTR. abstracted 7/3/02.         SOCIAL HISTORY:  Social History     Tobacco Use     Smoking status: Former Smoker     Packs/day: 1.00     Years: 30.00     Pack years: 30.00     Types: Cigarettes     Quit date: 2013     Years since quittin.4     Smokeless tobacco: Never Used   Substance Use Topics     Alcohol use: Not Currently     Alcohol/week: 42.0 standard drinks     Types: 42 Standard drinks or equivalent per week     Drug use: No       FAMILY HISTORY:  Family History   Problem Relation Age of Onset     Blood Disease Mother         Anemia     Cardiovascular Father      Cancer - colorectal Maternal Grandfather      Hypertension Brother      Diabetes Sister 5        Juvinile Diabetes passed at 36     Respiratory Other         Lung Cancer       PHYSICAL EXAM:   BP 95/60   Pulse 66   Temp 97  F (36.1  C) (Temporal)   Resp 20   Ht 1.753 m (5' 9\")   Wt 61.2 kg (135 lb)   SpO2 95%   BMI 19.94 kg/m      Constitutional: NAD, comfortable  Cardiovascular: RRR, normal S1 and S2, no r/c/g/m  Respiratory: CTAB  Psychiatric: mentation appears normal and affect normal  Head: Normocephalic. Atraumatic.    Neck: Neck supple. No adenopathy. Thyroid symmetric, normal size, trachea midline  Eyes:  PERRL, no icterus  ENT: Hearing adequate, pharynx normal without erythema or exudate  Abdomen:   Auscultation: + BS  Appearance: non-distended  Palpation: non-tender  NEURO: grossly negative  SKIN: no suspicious lesions or rashes  LYMPH:   anterior cervical: no adenopathy  posterior cervical: no adenopathy  supraclavicular: no adenopathy          ADDITIONAL COMMENTS:   I reviewed the patient's new clinical lab test results.   Recent Labs   Lab Test 01/10/22  1748 11/15/21  1607 10/26/21  1627 20  0112 " 05/18/20 2025 05/18/20  1935 05/18/20  0831 03/23/20  0847   WBC 21.7* 14.1* 10.6   < > 21.4*   < > 11.9* 12.9*   HGB 13.5 13.0* 11.8*   < > 12.1*   < > 12.4* 12.2*   * 100 100   < > 97   < > 101* 99    362 329   < > 238   < > 310 390   INR  --   --   --   --  1.51*  --  1.18* 1.07    < > = values in this interval not displayed.     Recent Labs   Lab Test 01/10/22  1748 10/26/21  1627 09/30/21  1241    138 138   POTASSIUM 3.7 3.7 3.5   CHLORIDE 100 109 106   CO2 27 23 29   BUN 25 20 23   CR 1.13 0.88 1.06   ANIONGAP 6 6 3   ULISSES 9.1 8.4* 8.5   * 73 90     Recent Labs   Lab Test 01/11/22  0206 01/10/22  1723 10/26/21  1627 02/16/21  1255 06/23/20  1200 06/16/20  1945 05/29/20  1310 05/28/20  1412 09/13/18  0000 09/10/18  0750 09/05/18  1905 09/05/18  1628   ALBUMIN  --   --  2.8* 3.2*  --   --  2.1* 2.1*   < > 2.5*   < > 3.2*   BILITOTAL  --   --  0.7 0.6  --   --  1.4* 1.3   < > 0.9   < > 1.2   DBIL  --   --   --   --   --   --  0.4* 0.3*  --  0.4*   < >  --    ALT  --   --  25 52  --   --  15 15   < > 183*   < > 97*   AST  --   --  21 37  --   --  28 27   < > 159*   < > 118*   ALKPHOS  --   --  118 115  --   --  71 88   < > 424*   < > 313*   PROTEIN Negative Negative  --   --  10*   < >  --   --    < >  --    < >  --    LIPASE  --   --   --   --   --   --   --   --   --   --   --  294    < > = values in this interval not displayed.             .    CONSULTATION ASSESSMENT AND PLAN:      77 yo male who presents with poor oral intake, 8 lb weight loss, and memory loss since the death of his wife in 09/2021.  We are consulted for difficulty swallowing.  The patient has a history of benign esophageal stenosis on EGD 9/2018.  Minimal luminal narrowing was noted and this was not felt to be the cause of the patient's difficulty swallowing at that time.  VFSS in 5/2020 with no aspiration but trace penetration.  Patient also reports difficulty with dentition making it difficult for him to chew.   "Patient is currently eating breakfast.    -  Agree with speech pathology consult.  -  Esophogram.  Depending on results, can consider EGD for further evaluation +/- dilatation.    I discussed the patient's findings and plan with Dr. Enrique.          CHANDLER Castillo  [facility]  Office:  [phone] call if needed after 5PM  Cell:  [phone], not available after 5PM at this number    Staff addendum: 76 yom with hx of acute disseminated encephalitis, CVA admitted with poor po intake, weight loss and progressive dysphagia. Has had dysphagia since 2005. Had EGD in 2018 showed mild stricture. Also complains of poor dentition. Has issues with choking when eating .     BP (P) 92/61   Pulse (P) 91   Temp (P) 97.7  F (36.5  C) (Oral)   Resp (P) 18   Ht 1.753 m (5' 9\")   Wt 59.5 kg (131 lb 3.2 oz)   SpO2 (P) 97%   BMI 19.37 kg/m    Gen: awake alert NAD chronically ill appearing  Cor: RRR  Abd S NT ND     A/P: 76 yom with chronic progressive dysphagia, multifactorial in origin. 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 Has not seen a dentist in a couple of years. Possibly has some element of dysmotility. May have recurrent esophageal stricture.  -Await esophagram  -Outpt dental follow up  -Consider feeding tube since dysphagia will likely worsen with age and without reversible causes.     Paty Enrique MD  [facility]  Phone [phone] until 5 pm  Office [phone] for after hours on call provider      "

## 2022-01-12 NOTE — CONSULTS
[facility] Consultation      Efrem Ramos MRN# [id number]   YOB: 1945 Age: 76 year old   Date of Admission: 1/10/2022  Requesting physician: Shawn Farley MD  Reason for consult: MGUS           Assessment and Plan:   76 year old male well known to me and with history of severe aortic stenosis, CAD, pulmonary fibrosis, dysphagia, and MGUS. He is currently admitted for generalized weakness, anorexia, weight loss, dysphagia, possible pneumonia.    1. Monoclonal gammopathy of undetermined significance, IgG kappa  --MGUS initial diagnosis in 2013 when his M-spike was 0.6 g/dL.    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   --Last bone survey from 2/16/2021 showed no lytic lesions.  --Will repeat MGUS parameters.    2. Mild mediastinal lymphadenopathy  --May be reactive to infection or inflammation given current symptomatology.  --Repeat chest CT in 3 months to reevaluate mediastinal nodes -- if increasing adenopathy, will discuss biopsy with patient.    3. Leukocytosis with neutrophilia  --Persistent since 5/2020.  --Peripheral blood smear showed normochromic normocytic anemia with absolute neutrophilia and left shift. Rouleax not evident. No atypical or dysplastic cells are observed.  --Likely reactive in context of infection suspicion.    4. Worsening pulmonary fibrosis  5. Sputum positive for Moraxella  --Await pulmonary consult.    6. Normochromic normocytic anemia  7. Severe malnutrition  --Admission Hgb normal, has since decreased likely related to blood draws, acute illness.  --Will recheck B12 (has history of B12 deficiency), iron studies, folate.    Thank you for the consult. Will follow.    Cherelle Hurley  "MD  Hematology/Oncology  [facility]             Chief Complaint:   Generalized Weakness and Abnormal Labs           History of Present Illness:   This patient is a 76 year old male well known to me and with history of severe aortic stenosis, CAD, pulmonary fibrosis, dysphagia, and MGUS. He is currently admitted for generalized weakness, anorexia, weight loss, dysphagia, possible pneumonia.    --1/10/2022 Hgb 13.5, , WBC elevated at 21.7 with ANC 17.2, platelets 422,000, creatinine 1.13, calcium 9.1, SARS CoV2 PCR and influenza A/B negative.  --2022 Head CT showed no acute pathology.  --2022 CT chest w/o contrast showed progressive emphysema and fibrotic changes with increased interstitial consolidation related to underlying usual interstitial pneumonitis, increasing mediastinal lymphadenopathy -- 1.7 cm subcarinal; 11 mm right paratracheal; no pulmonary mass.  --2022: Sputum culture gram stain > 25 PMNs/LPF, 4+ mixed leti; culture positive for 4+ Moraxella catarrhalis.  --2022: Peripheral blood smear showed normochromic normocytic anemia with absolute neutrophilia and left shift. Rouleax not evident. No atypical or dysplastic cells are observed.  --2022 Esophagram showed severe esophageal dysmotility and no definite fixed strictures.    Alfredo reports low appetite, 8-pound weight loss, worsening dysphagia.         Physical Exam:   Vitals were reviewed  Blood pressure (!) 85/55, pulse 88, temperature 98  F (36.7  C), resp. rate 17, height 1.753 m (5' 9\"), weight 59.5 kg (131 lb 3.2 oz), SpO2 94 %.  Temperatures:  Current - Temp: 98  F (36.7  C); Max - Temp  Av.2  F (36.8  C)  Min: 98  F (36.7  C)  Max: 98.4  F (36.9  C)  Respiration range: Resp  Av.2  Min: 16  Max: 20  Pulse range: Pulse  Av.7  Min: 81  Max: 98  Blood pressure range: Systolic (24hrs), Av , Min:85 , Max:96   ; Diastolic (24hrs), Av, Min:50, Max:66    Pulse oximetry range: " SpO2  Av.6 %  Min: 94 %  Max: 96 %    Intake/Output Summary (Last 24 hours) at 2022 1723  Last data filed at 2022 0825  Gross per 24 hour   Intake 260 ml   Output 675 ml   Net -415 ml       GENERAL: No acute distress. Cachectic.  SKIN: No rashes or jaundice.  HEENT: Normocephalic, atraumatic. Eyes anicteric.   LYMPH: No palpable lymphadenopathy in the cervical, supraclavicular regions.  LUNGS: NO audible cough or wheezing.  ABDOMEN: Soft, nontender, nondistended with no palpable hepatosplenomegaly.  EXTREMITIES: No clubbing, cyanosis, or edema.  MENTAL: Alert and oriented to person, place, and time.  NEURO: No focal motor deficits.              Past Medical History:   I have reviewed this patient's past medical history  Past Medical History:   Diagnosis Date     Atrial fibrillation (H)     amiodarone therapy discontinued due to pulmonary toxicity     Atrial flutter (H)      Benign essential hypertension 2018     Cancer (H) vocal cord     Carpal tunnel syndrome     abstracted 7/3/02.     Coronary artery disease involving native coronary artery of native heart without angina pectoris 2020    Cath 2020: patent stents; Cath 2020: ISABEL x4 to RCA; Cath 3/2020: 1 vessel disease; Cath 2017: moderate 2 vessel disease     CVA (cerebral infarction) 2015     Demyelinating disease of central nervous system, unspecified (H)     abstracted 7/3/02.     Dyspnea      ENCEPHALOPATHY UNSPECIFIED  3/15/2005    acute diseminated encephalitis     Femoral artery hematoma complicating cardiac catheterization     2020     History of thrombophlebitis      Mitral valve problem 2013    TRANSTHORACIC ECHOCARDIOGRAM 2013 There is a linear strand like projection seen in the LV cavity in diastole that I suspect is the posterior mitral leaflet although I cannot exclude a torn chordae or small vegetation       Mixed hyperlipidemia 3/15/2005     Nonrheumatic mitral valve stenosis      MEL (obstructive  sleep apnea)      Other and unspecified noninfectious gastroenteritis and colitis(558.9)     abstracted 7/3/02.     Pneumonia 8/17/2016     PVD (peripheral vascular disease) (H)      Redundant colon     needs CT colonography     Shingles      SKIN DISORDERS NEC 3/15/2005     Sleep apnea      Sleep apnea      SVT (supraventricular tachycardia) (H)              Past Surgical History:   I have reviewed this patient's past surgical history  Past Surgical History:   Procedure Laterality Date     BIOPSY  brain 2002     BONE MARROW BIOPSY, BONE SPECIMEN, NEEDLE/TROCAR N/A 6/8/2017    Procedure: BIOPSY BONE MARROW;  UNILATERAL BONE MARROW BIOPSY (CONSCIOUS SEDATION) ;  Surgeon: Jamie Gonzales MD;  Location:  GI     CARDIAC CATHERIZATION  09/05/2017    2V CAD, IFR of RCA 0.95     CV CORONARY ANGIOGRAM N/A 3/23/2020    Procedure: Coronary Angiogram and right heart cath;  Surgeon: Gino Ferrer MD;  Location:  HEART CARDIAC CATH LAB     CV HEART CATHETERIZATION WITH POSSIBLE INTERVENTION N/A 5/28/2020    Procedure: Heart Catheterization with Possible Intervention;  Surgeon: Larry Chawla MD;  Location:  HEART CARDIAC CATH LAB     CV HEART CATHETERIZATION WITH POSSIBLE INTERVENTION N/A 5/18/2020    Procedure: Heart Catheterization with Possible Intervention;  Surgeon: Gaurang Swenson MD;  Location:  HEART CARDIAC CATH LAB     CV INSTANTANEOUS WAVE-FREE RATIO N/A 3/23/2020    Procedure: Instantaneous Wave-Free Ratio;  Surgeon: Gino Ferrer MD;  Location:  HEART CARDIAC CATH LAB     CV PCI ATHERECTOMY ORBITAL N/A 5/18/2020    Procedure: Percutaneous Coronary Intervention Atherectomy Rotational;  Surgeon: Gaurang Swenson MD;  Location:  HEART CARDIAC CATH LAB     CV PCI STENT DRUG ELUTING N/A 5/18/2020    Procedure: Percutaneous Coronary Intervention Stent Drug Eluting;  Surgeon: Gaurang Swenson MD;  Location:  HEART CARDIAC CATH LAB     CV RIGHT HEART CATH  MEASUREMENTS RECORDED N/A 2020    Procedure: Right Heart Cath;  Surgeon: Larry Chawla MD;  Location:  HEART CARDIAC CATH LAB     CV TEMPORARY PACEMAKER INSERTION N/A 2020    Procedure: Temporary Pacemaker Insertion;  Surgeon: Gaurang Swenson MD;  Location:  HEART CARDIAC CATH LAB     ESOPHAGOSCOPY, GASTROSCOPY, DUODENOSCOPY (EGD), COMBINED N/A 2018    Procedure: COMBINED ESOPHAGOSCOPY, GASTROSCOPY, DUODENOSCOPY (EGD), BIOPSY SINGLE OR MULTIPLE;;  Surgeon: Xander Marie MD;  Location:  GI     HEAD & NECK SURGERY       ORTHOPEDIC SURGERY      right arm ulna reset after injury     THORACOSCOPIC WEDGE RESECTION LUNG Right 2016    Procedure: THORACOSCOPIC WEDGE RESECTION LUNG;  Surgeon: Abdelrahman Noriega MD;  Location:  OR     [facility] NONSPECIFIC PROCEDURE  as a child    T & A. abstracted 7/3/02.     ZZC NONSPECIFIC PROCEDURE  early    CTR. abstracted 7/3/02.               Social History:   I have reviewed this patient's social history  Social History     Tobacco Use     Smoking status: Former Smoker     Packs/day: 1.00     Years: 30.00     Pack years: 30.00     Types: Cigarettes     Quit date: 2013     Years since quittin.4     Smokeless tobacco: Never Used   Substance Use Topics     Alcohol use: Not Currently     Alcohol/week: 42.0 standard drinks     Types: 42 Standard drinks or equivalent per week             Family History:   I have reviewed this patient's family history  Family History   Problem Relation Age of Onset     Blood Disease Mother         Anemia     Cardiovascular Father      Cancer - colorectal Maternal Grandfather      Hypertension Brother      Diabetes Sister 5        Juvinile Diabetes passed at 36     Respiratory Other         Lung Cancer             Allergies:     Allergies   Allergen Reactions     Sulfa Drugs Difficulty breathing, Swelling and Hives     Adhesive Tape Blisters     Amiodarone Other (See Comments)     Developed pleural  effusion     Penicillins Other (See Comments)     Reaction occurred as a child  Other reaction(s): Hives             Medications:   I have reviewed this patient's current medications  Medications Prior to Admission   Medication Sig Dispense Refill Last Dose     acetaminophen (TYLENOL) 325 MG tablet Take 325-650 mg by mouth every 6 hours as needed for mild pain    Past Month at Unknown time     apixaban ANTICOAGULANT (ELIQUIS) 5 MG tablet Take 1 tablet (5 mg) by mouth 2 times daily 180 tablet 3 1/10/2022 at AM     atorvastatin (LIPITOR) 40 MG tablet Take 1 tablet (40 mg) by mouth daily 90 tablet 3 1/10/2022 at Unknown time     budesonide (ENTOCORT EC) 3 MG EC capsule 9 mg daily x 6 weeks, then 6 mg daily x 2 weeks, then 3 mg daily x 2 weeks 126 capsule 0 1/10/2022 at 3 mg     calcium carbonate 600 mg-vitamin D 400 units (CALTRATE) 600-400 MG-UNIT per tablet Take 1 tablet by mouth 2 times daily    1/10/2022 at AM     clopidogrel (PLAVIX) 75 MG tablet Take 1 tablet (75 mg) by mouth daily 90 tablet 1 1/10/2022 at Unknown time     digoxin (LANOXIN) 125 MCG tablet Take 1 tablet (125 mcg) by mouth daily 90 tablet 3 1/10/2022 at Unknown time     furosemide (LASIX) 20 MG tablet Take 1 tablet (20 mg) by mouth daily AND 0.5 tablets (10 mg) daily (with lunch). 135 tablet 3 1/10/2022 at AM     gabapentin (NEURONTIN) 300 MG capsule TAKE 2 CAPSULES BY MOUTH EVERY EVENING 180 capsule 3 1/9/2022 at Unknown time     melatonin 1 MG TABS tablet Take 1 mg by mouth nightly as needed for sleep   1/9/2022 at Unknown time     metoprolol succinate ER (TOPROL XL) 25 MG 24 hr tablet Take 1 tablet (25 mg) by mouth daily 90 tablet 3 1/10/2022 at Unknown time     mirtazapine (REMERON) 15 MG tablet Take 1 tablet (15 mg) by mouth At Bedtime 90 tablet 3 1/9/2022 at Unknown time     multivitamin (OCUVITE) TABS Take 1 tablet by mouth daily    1/10/2022 at Unknown time     potassium chloride ER (K-TAB/KLOR-CON) 10 MEQ CR tablet Take 1 tablet (10 mEq)  by mouth daily 90 tablet 3 1/10/2022 at Unknown time     senna-docusate (SENOKOT-S/PERICOLACE) 8.6-50 MG tablet Take 2 tablets by mouth 2 times daily (Patient taking differently: Take 2 tablets by mouth 2 times daily as needed for constipation ) 120 tablet 3 Past Week at Unknown time     Current Facility-Administered Medications Ordered in Epic   Medication Dose Route Frequency Last Rate Last Admin     acetaminophen (TYLENOL) tablet 650 mg  650 mg Oral Q6H PRN        Or     acetaminophen (TYLENOL) Suppository 650 mg  650 mg Rectal Q6H PRN         apixaban ANTICOAGULANT (ELIQUIS) tablet 5 mg  5 mg Oral BID   5 mg at 01/12/22 0812     atorvastatin (LIPITOR) tablet 40 mg  40 mg Oral Daily   40 mg at 01/12/22 0813     budesonide (ENTOCORT EC) EC capsule 3 mg  3 mg Oral Daily   3 mg at 01/12/22 0812     cefTRIAXone (ROCEPHIN) 2 g vial to attach to  ml bag for ADULTS or NS 50 ml bag for PEDS  2 g Intravenous Q24H 200 mL/hr at 01/12/22 1547 2 g at 01/12/22 1547     clopidogrel (PLAVIX) tablet 75 mg  75 mg Oral Daily   75 mg at 01/12/22 0813     digoxin (LANOXIN) tablet 125 mcg  125 mcg Oral Daily   125 mcg at 01/12/22 0813     folic acid-vit B6-vit B12 (FOLGARD) per tablet 1 tablet  1 tablet Oral Daily   1 tablet at 01/12/22 0813     gabapentin (NEURONTIN) capsule 300 mg  300 mg Oral At Bedtime   300 mg at 01/11/22 2152     [Held by provider] HYDROmorphone (DILAUDID) injection 0.2 mg  0.2 mg Intravenous Q2H PRN         lidocaine (LMX4) cream   Topical Q1H PRN         lidocaine 1 % 0.1-1 mL  0.1-1 mL Other Q1H PRN         melatonin tablet 1 mg  1 mg Oral At Bedtime PRN         metoprolol succinate ER (TOPROL-XL) 24 hr tablet 25 mg  25 mg Oral Daily         mirtazapine (REMERON) tablet 15 mg  15 mg Oral At Bedtime   15 mg at 01/11/22 2153     naloxone (NARCAN) injection 0.2 mg  0.2 mg Intravenous Q2 Min PRN        Or     naloxone (NARCAN) injection 0.4 mg  0.4 mg Intravenous Q2 Min PRN        Or     naloxone (NARCAN)  injection 0.2 mg  0.2 mg Intramuscular Q2 Min PRN        Or     naloxone (NARCAN) injection 0.4 mg  0.4 mg Intramuscular Q2 Min PRN         ondansetron (ZOFRAN-ODT) ODT tab 4 mg  4 mg Oral Q6H PRN        Or     ondansetron (ZOFRAN) injection 4 mg  4 mg Intravenous Q6H PRN         sodium chloride (PF) 0.9% PF flush 3 mL  3 mL Intracatheter q1 min prn         sodium chloride (PF) 0.9% PF flush 3 mL  3 mL Intracatheter Q8H   3 mL at 01/12/22 1351     No current [facility]-ordered outpatient medications on file.             Review of Systems:     The 14 point Review of Systems is negative other than noted in the HPI.            Data:   All laboratory data reviewed  Results for orders placed or performed during the hospital encounter of 01/10/22 (from the past 24 hour(s))   CBC with platelets   Result Value Ref Range    WBC Count 14.0 (H) 4.0 - 11.0 10e3/uL    RBC Count 3.35 (L) 4.40 - 5.90 10e6/uL    Hemoglobin 10.8 (L) 13.3 - 17.7 g/dL    Hematocrit 32.7 (L) 40.0 - 53.0 %    MCV 98 78 - 100 fL    MCH 32.2 26.5 - 33.0 pg    MCHC 33.0 31.5 - 36.5 g/dL    RDW 14.0 10.0 - 15.0 %    Platelet Count 303 150 - 450 10e3/uL   Basic metabolic panel   Result Value Ref Range    Sodium 139 133 - 144 mmol/L    Potassium 3.6 3.4 - 5.3 mmol/L    Chloride 106 94 - 109 mmol/L    Carbon Dioxide (CO2) 30 20 - 32 mmol/L    Anion Gap 3 3 - 14 mmol/L    Urea Nitrogen 17 7 - 30 mg/dL    Creatinine 0.93 0.66 - 1.25 mg/dL    Calcium 8.3 (L) 8.5 - 10.1 mg/dL    Glucose 79 70 - 99 mg/dL    GFR Estimate 84 >60 mL/min/1.73m2   XR Video Swallow with SLP or OT    Narrative    VIDEO SWALLOWING EVALUATION   1/12/2022 9:20 AM     HISTORY: Dysphagia, weight loss.    COMPARISON: 5/31/2020    FLUOROSCOPY TIME: 2.2 minutes.  SPOT IMAGES OR CINE RUNS: 5    FINDINGS:    Thin: Aspiration    Mildly thick: Penetration to the cords.    Pudding: No penetration or aspiration.    Refer to speech pathology report for additional details.    RAJI JACKSON MD         SYSTEM  ID:  [id number]

## 2022-01-12 NOTE — PROGRESS NOTES
"[facility] Progress Note     Interval History:  No new complaints. Some report of loose stools at home. One loose stool here and one formed stool charted. Son wonders about a GI infection and if Vitamin D has been checked.     Physical Exam:    BP 91/66 (BP Location: Right arm)   Pulse 98   Temp 98.4  F (36.9  C) (Oral)   Resp 16   Ht 1.753 m (5' 9\")   Wt 59.5 kg (131 lb 3.2 oz)   SpO2 94%   BMI 19.37 kg/m    Temp (24hrs), Av.2  F (36.8  C), Min:98  F (36.7  C), Max:98.4  F (36.9  C)    Patient Vitals for the past 72 hrs:   Weight   22 1100 59.5 kg (131 lb 3.2 oz)   01/10/22 1839 61.2 kg (135 lb)       Intake/Output Summary (Last 24 hours) at 2022 1504  Last data filed at 2022 0825  Gross per 24 hour   Intake 260 ml   Output 675 ml   Net -415 ml       Constitutional: No acute distress, frail appearing   Cardiovascular: irregular, normal S1/S2   Respiratory: Effort normal, CTA bilaterally, occasional wheeze   Abdomen: Soft, nondistended, nontender    Laboratory Data  Recent Labs   Lab Test 22  0608 22  0917 01/10/22  1748 20  0112 205 20  1935 20  0831 20  0847   WBC 14.0* 14.4* 21.7*   < > 21.4*   < > 11.9* 12.9*   HGB 10.8* 12.0* 13.5   < > 12.1*   < > 12.4* 12.2*   MCV 98 98 103*   < > 97   < > 101* 99    333 422   < > 238   < > 310 390   INR  --   --   --   --  1.51*  --  1.18* 1.07    < > = values in this interval not displayed.     Recent Labs   Lab Test 22  0608 01/10/22  1748 10/26/21  1627    133 138   POTASSIUM 3.6 3.7 3.7   CHLORIDE 106 100 109   CO2 30 27 23   BUN 17 25 20   CR 0.93 1.13 0.88   ANIONGAP 3 6 6   ULISSES 8.3* 9.1 8.4*     Recent Labs   Lab Test 22  0206 01/10/22  1723 10/26/21  1627 21  1255 20  1200 20  1945 20  1310 20  1412 18  0000 09/10/18  0750 18  1905 18  1628   ALBUMIN  --   --  2.8* 3.2*  --   --  2.1* 2.1*   < > 2.5*   < > 3.2*   BILITOTAL  --   " --  0.7 0.6  --   --  1.4* 1.3   < > 0.9   < > 1.2   DBIL  --   --   --   --   --   --  0.4* 0.3*  --  0.4*   < >  --    ALT  --   --  25 52  --   --  15 15   < > 183*   < > 97*   AST  --   --  21 37  --   --  28 27   < > 159*   < > 118*   ALKPHOS  --   --  118 115  --   --  71 88   < > 424*   < > 313*   PROTEIN Negative Negative  --   --  10*   < >  --   --    < >  --    < >  --    LIPASE  --   --   --   --   --   --   --   --   --   --   --  294    < > = values in this interval not displayed.       Imaging  Esophagram 1/11/22  IMPRESSION: Severe tertiary contractions and esophageal dysmotility.  No definite fixed strictures demonstrated.     Endoscopic Workup  EGD 9/2018  Findings:        A schatzki's ring was found 42 cm from the incisors. This stenosis was        minimal. Ring biopsied X4 for disruption. Tertiary esophageal        contractions noted in the esophageal body.        The entire examined stomach was normal.        The duodenal bulb and second portion of the duodenum were normal.                                                                                     Impression:               - Benign-appearing esophageal stenosis. Minimal                             lumenal narrowing and I doubt that this is a cause                             of his swallowing difficulty. Esophageal spasm                             could be present but more difficult to diagnose and                             treat. Would favor diet per speech pathology                             recommendations and observe response.                             - Normal stomach.                             - Normal duodenal bulb and second portion of the                             duodenum.     Assessment & Plan:  77 yo male acute disseminated encephalitis, CVA, a fib, CAD,  admitted with poor po intake, weight loss (8 lb) and progressive dysphagia. Has leukocytosis. Has had dysphagia since 2005. Had EGD in 2018 showed mild  stricture. Also complains of poor dentition. We are consulted for difficulty swallowing.  The patient has a history of benign esophageal stenosis on EGD 9/2018.  Minimal luminal narrowing was noted and this was not felt to be the cause of the patient's difficulty swallowing at that time.  VFSS in 5/2020 with no aspiration but trace penetration.  Patient also reports difficulty with dentition making it difficult for him to chew.     Esophagram showed severe tertiary contractions and esophageal dysmotility. No definite fixed strictures demonstrated.       Plan  -Nutrition/SLP recommendations for diet/supplements   -Calorie counts  -Outpt dental follow up   -Could consider feeding tube to support nutritional needs if unable to consume enough calories/nutrition with supplements since dysphagia will likely worsen with age; without reversible cause   -Pt has esophageal dysmotility as cause of dysphagia. No indication for EGD  -Monitor stools. If having loose stools, can test for infections (enteric path, c. Diff, listeria) to rule that out. Has hx of collagenous colitis, could increase budesonide to 9mg daily x4 weeks, 6mg  x2 weeks, back down to 3mg daily if having loose stools with neg infectious workup.   -Will check Vitamin D level   -Discussed with Dr. Enrique.No further GI recommendations at this time. We will sign off. Please call with any concerns or questions or reconsult as needed.      Sulma Belle CNP  [facility]  Cell: [phone]   Office: [phone]

## 2022-01-12 NOTE — PROGRESS NOTES
"Shift     Code status: Special, DNI, pending DNR, son to speak w/ patient, planning for Palliative consult  Summary:  fatigue/generalized weakness, weight loss, increased forgetfulness since wife  in 2021   Orientation: A&Ox4  Activity Level: SBA GB/W  Fall Risk: yes   Behavior & Aggression Tool Color: green   Pain Management: patient w/o c/o pain   ABNL VS/O2: VSS RA, low Bps asymptomatic, boluses given   Diet: Pureed level 4, thickened fluids level 2- tolerated   Skin: blanchable redness sacrum- mepilex applied   Bowel/Bladder: continent.   Drains/Devices: L MCV g18 on SL, bilateral PCDs  Tests/Procedures for next shift: Video swallow study 22   Anticipated DC date: pending speech and GI    Other Important Info: PT/speech/SW/GI/nutrition consulted. RUE/RLE numbness-baseline from prior stroke.     BP (!) 88/50 (BP Location: Right arm, Patient Position: Semi-Chandler's)   Pulse 81   Temp 98.2  F (36.8  C) (Oral)   Resp 16   Ht 1.753 m (5' 9\")   Wt 59.5 kg (131 lb 3.2 oz)   SpO2 94%   BMI 19.37 kg/m          "

## 2022-01-12 NOTE — PROVIDER NOTIFICATION
MD Notification    Notified Person: MD    Notified Person Name: Ramesh Willoughby     Notification Date/Time: 1/11/2022 1938h     Notification Interaction: AMCOM     Purpose of Notification: FYI, BP is 86/61. asymptomatic. Are you ok with another bolus?     Orders Received: ,  250cc/hr     Comments:

## 2022-01-12 NOTE — PROGRESS NOTES
"SPIRITUAL HEALTH SERVICES Progress Note  [facility]    SH Request.    Ptnt Alfredo shared that he had no SH needs at this time. Upon further inquiry, he shared that his wife had passed away in September and he is still living in their house of 50+ years.     He said he didn't understand what's going on with him physically, but he wasn't worried about it, shrugging when I asked if he had concerns.    Alfredo spoke of his son living with him now, and that \"it's going so-so\" but \"so long as he's taken care of, it's ok.\" He declined to discuss it further.    When I asked about spirituality, Alfredo said he and Christian \"went their separate ways years ago.\"    I offered reflective conversation and encouraged exploration, and departed with a blessing.    SH remains available.    Cherise Richards  Associate     "

## 2022-01-12 NOTE — PROGRESS NOTES
Care Management Follow Up    Length of Stay (days): 1    Expected Discharge Date: 01/13/2022     Concerns to be Addressed: discharge planning     Patient plan of care discussed at interdisciplinary rounds: Yes    Anticipated Discharge Disposition: Home,Outpatient Rehab (PT, OT, SLP, Cardiac or Pulmonary)     Anticipated Discharge Services: None  Anticipated Discharge DME: None    Patient/family educated on Medicare website which has current facility and service quality ratings: no  Education Provided on the Discharge Plan:    Patient/Family in Agreement with the Plan: yes    Referrals Placed by CM/SW:    Private pay costs discussed: Not applicable    Additional Information:  Called Efrem (son) and asked if he'll be in the hospital to talk more about what they decided for discharge planning. He said that he'll be in later today.    Addendum: Talked to Efrem (son) and patient in patient's room. They said that they are still going to talk about patient going to TCU. They asked for writer to come in tomorrow.    Will continue to follow.    THEO Woodard

## 2022-01-12 NOTE — CONSULTS
CLINICAL NUTRITION SERVICES  -  ASSESSMENT NOTE      Recommendations Ordered by Registered Dietitian (RD): Vital Cuisine supplement daily    Malnutrition: % Weight Loss:  > 5% in 1 month (severe malnutrition)  % Intake:  <75% for >/= 1 month (moderate malnutrition)  Subcutaneous Fat Loss:  Orbital region moderate depletion and Upper arm region severe depletion  Muscle Loss:  Temporal region severe depletion, Clavicle bone region severe depletion, Acromion bone region moderate depletion, Scapular bone region moderate depletion, Dorsal hand region severe depletion, Patellar region moderate depletion and Posterior calf region moderate depletion  Fluid Retention:  None noted    Malnutrition Diagnosis: Severe malnutrition  In Context of:  Acute illness or injury        REASON FOR ASSESSMENT  Efrem Ramos is a 76 year old male seen by Registered Dietitian for Admission Nutrition Risk Screen for Have you recently lost weight without trying? - Yes, 2-13#  Have you eaten poorly because of a decreased appetite? - Yes    and Provider Order - Weight loss/malnutrition and Provider Order - Weight loss, recommend foods to help gain and/or maintain weight       NUTRITION HISTORY  - Information obtained from patient.  He notes that he has had a poor appetite for > 2 weeks and during this time period has been eating < 1/2 of his normal intake.  Patient also notes that he has a lot of issues with swallowing which is likely a deterrent from him eating much.  Patient does take 2-3 Boost drinks per day at home.        CURRENT NUTRITION ORDERS  Diet Order:     Dysphagia pureed (level 4), mildly thick liquids (level 2)     Current Intake/Tolerance:  Patient was able to eat 100% of breakfast today (eggs, pudding, peaches, and OJ) and 75% of dinner last night (roast beef, mashed potatoes, peaches, pudding, and OJ).      NUTRITION FOCUSED PHYSICAL ASSESSMENT FOR DIAGNOSING MALNUTRITION)  Yes                Observed:    Muscle wasting  "(refer to documentation in Malnutrition section) and Subcutaneous fat loss (refer to documentation in Malnutrition section)    Obtained from Chart/Interdisciplinary Team:  None     ANTHROPOMETRICS  Height: 5' 9\"  Weight: 59.5 kg (131#)(1/11)  Body mass index is 19.37 kg/m   Weight Status:  Normal BMI  IBW: 72.7 kg   % IBW: 82%  Weight History:   Wt Readings from Last 10 Encounters:   01/11/22 59.5 kg (131 lb 3.2 oz)   01/10/22 60.1 kg (132 lb 9.6 oz)   11/16/21 63.5 kg (140 lb)   11/15/21 64.9 kg (143 lb)   10/29/21 62.6 kg (138 lb 1.6 oz)   10/26/21 62.8 kg (138 lb 8 oz)   09/30/21 59.9 kg (132 lb)   08/30/21 59.6 kg (131 lb 8 oz)   07/27/21 55.3 kg (122 lb)   06/22/21 53.4 kg (117 lb 12.8 oz)     Patient notes an 8# weight loss but unsure of the time  Based on above, down 12# or 9% over the last two months     LABS  Labs reviewed    MEDICATIONS  Medications reviewed      ASSESSED NUTRITION NEEDS PER APPROVED PRACTICE GUIDELINES:    Dosing Weight 59.5 kg   Estimated Energy Needs: 6560-0798 kcals (30-35 Kcal/Kg)  Justification: repletion  Estimated Protein Needs:  grams protein (1.3-1.8 g pro/Kg)  Justification: repletion and hypercatabolism with acute illness  Estimated Fluid Needs: 1219-7663 mL (1 mL/Kcal)  Justification: maintenance    MALNUTRITION:  % Weight Loss:  > 5% in 1 month (severe malnutrition)  % Intake:  <75% for >/= 1 month (moderate malnutrition)  Subcutaneous Fat Loss:  Orbital region moderate depletion and Upper arm region severe depletion  Muscle Loss:  Temporal region severe depletion, Clavicle bone region severe depletion, Acromion bone region moderate depletion, Scapular bone region moderate depletion, Dorsal hand region severe depletion, Patellar region moderate depletion and Posterior calf region moderate depletion  Fluid Retention:  None noted    Malnutrition Diagnosis: Severe malnutrition  In Context of:  Acute illness or injury    NUTRITION DIAGNOSIS:  Malnutrition related to weight " loss, reduced intake, and fat and muscle loss as evidenced by coding for severe malnutrition       NUTRITION INTERVENTIONS  Recommendations / Nutrition Prescription  Continue diet as tolerated per SLP  Vital Cuisine daily at 2pm     Implementation  Nutrition education: Patient was provided with high calorie, high protein tips yesterday by my colleague   Medical Food Supplement:  Vital Cuisine as above     Nutrition Goals  Patient will continue to consume at least 75% of meals and will consume % of supplement     MONITORING AND EVALUATION:  Progress towards goals will be monitored and evaluated per protocol and Practice Guidelines    Rocio Chang RD, LD, CNSC   Clinical Dietitian - [facility]

## 2022-01-12 NOTE — PROGRESS NOTES
[facility]    Medicine Progress Note - Hospitalist Service       Date of Admission:  1/10/2022    Assessment & Plan           Efrem Ramos is a 76 year old male admitted on 1/10/2022.   Complex past history including severe aortic stenosis, A-fib/flutter, HTN, CAD s/p PCI, pulmonary fibrosis, dysphagia among other chronic medical issues who with leukocytosis, increasing coughing, poor intake, fatigue.  Son reports patient had respiratory illness about 2 weeks ago with ongoing productive cough; son reporting increased HIGH as well as worsening oral intake.  This led to evaluation at Urgent Care with finding of leukocytosis of 20 so referred to ED.    Primarily suspect presentation is combination of possible CAP (though unclear) with deconditioning related to severe malnutrition in setting of dysphagia and esophageal dysmotility and multiple other underlying comorbidities.  May well be component of grieving vs depression given the death of his wife in September 2021.       Leukocytosis  Possible CAP  Admission leukocytosis of 21.7 but procal <0.05 and afebrile without hypoxia; though reporting a productive cough with son reporting increase HIGH.  COVID/flu testing negative.   * Admission CT chest showing progressed emphysema, progressed fibrotic changes with bronchiectasis and consolidation consistent with UIP, cannot rule out infection; also noting increased LAD.  * Initially monitored off abx and WBC improved to 14 without intervention  * peripheral smear negative for dysplastic or atypical cells    - sputum culture positive for Moraxella catarrhalis - without hypoxia or fever unclear if this represents infection vs colonization in setting of bronchiectasis, but will start ceftriaxone   - follow CBC  - Oncology consult for any further work-up of possible lymphoproliferative disorder given Hx MGUS, LAD and recent weight loss, though weight loss most likely related to dysphagia  - Pulm  consult regarding worsening CT findings and hx of fibrosis - ?contributing to current symptoms     Mod-severe oropharyngeal dysphagia  Esophageal dysmotility  Hx esophageal stricture s/p dilation 2018  Hx vocal fold malignancy  Reports lack of appetite due to significant work of eating, likely related to poor dentition, dysphagia and esophageal dysmotility; noting increased difficulty with certain textures and restricting intake due to fear of choking.  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    - Pulmonary consult given worsening findings on CT     Severe valvular heart disease   severe aortic stenosis with aortic valve area of 0.8 cm  severe tricuspid regurgitation w/ resultant   chronic valvular heart failure  Most recent echocardiogram December 6, 2021 with preserved LVEF, moderately dilated right ventricle.  Patient is followed by cardiology, recently discussed at TAVR conference, with son reporting he is not felt to be a candidate due to high risk.  - continue BB, statin     Severe coronary artery disease  Rotational arthrectomy and drug-eluting stent x4 to proximal to distal RCA in May 2020.  Complicated by left femoral artery bleed with retroperitoneal and scrotal hematoma.  -Continue PTA Plavix, atorvastatin     Persistent atrial fibrillation; [id number] of 6  - continue PTA Eliquis, digoxin, metoprolol  - Cardiac telemetry     Moderate to severe protein calorie nutrition  Most likely related to dysphagia and esophageal dysmotility.  May consider also cardiac wasting in the setting of known severe valvular heart disease.  * Has had >5% weight loss in 1 month, <75% oral intake for  >1 month, has subcutaneous fat and muscle loss on exam.   - Continue Remeron 15 mg at bedtime     Monoclonal gammopathy of uncertain significance  No known lytic lesions.  Increasing mediastinal lymphadenopathy noted on admission CT imaging.    * LAD may be related to possible infection, but leukocytosis appears rather chronic and is afebrile without hypoxia  - Oncology consult as above     Collagenous colitis  - Continue PTA budesonide 3 mg daily      Fall, weakness  - consult PT     Goals of treatment  * See progress note by Cee Thomason 1/11 regarding goals of care discussion.    * Had another discussion with patient and son at bedside regarding code status 1/12.  Discussed very low likelihood of successful resuscitation or wean from vent with high risk of injury and painful final moments with CPR and strongly recommended DNR/DNI status.  He states he would like to think about it, but at this time prefers to remain FULL code.        Diet: Pureed Diet (level 4) Mildly Thick (level 2)  Snacks/Supplements Adult: Other; Chocolate Vital Cuisine daily at 2 pm; Between Meals    DVT Prophylaxis: Pneumatic Compression Devices  Bravo Catheter: Not present  Central Lines: None  Code Status:   Full code per patient    Disposition Plan   Expected Discharge: 01/13/2022     Anticipated discharge location: home with help/services    Delays:            The patient's care was discussed with the Bedside Nurse, Patient and Patient's Family.    Time Spent on this Encounter   I spent 45 minutes on the unit/floor managing the care of Efrem Ramos. Over 50% of my time was spent on the following:   - Counseling the patient and/or family regarding: diagnostic results, prognosis and risks and benefits of treatment options as well long discussion regarding code status  - Coordination of care with the: nurse and family    See plan above and goals of treatment above regarding discussion.     MD Shawn Hill,  MD  Hospitalist Service  [facility]  Securely message with the [employer] Web Console (learn more here)  Text page via [employer] Paging/Directory        Clinically Significant Risk Factors Present on Admission             # Coagulation Defect: home medication list includes an anticoagulant medication  # Platelet Defect: home medication list includes an antiplatelet medication   # Severe Malnutrition: based on nutrition assessment     ______________________________________________________________________    Interval History   He reports feeling slightly weaker today.  Ongoing productive cough unchanged.  No fever/chills.  Difficulty with eating, reports no appetite.      Data reviewed today: I reviewed all medications, new labs and imaging results over the last 24 hours. I personally reviewed no images or EKG's today.    Physical Exam   Vital Signs: Temp: 98.4  F (36.9  C) Temp src: Oral BP: 91/66 Pulse: 98   Resp: 16 SpO2: 94 % O2 Device: None (Room air)    Weight: 131 lbs 3.2 oz  General Appearance: Thin elderly male in NAD  Respiratory: respirations non-labored on room air  Cardiovascular:   GI:   Skin:   Other: Alert and appropriate, cranial nerves grossly intact     Data   Recent Labs   Lab 01/12/22  0608 01/11/22  0917 01/10/22  1748   WBC 14.0* 14.4* 21.7*   HGB 10.8* 12.0* 13.5   MCV 98 98 103*    333 422     --  133   POTASSIUM 3.6  --  3.7   CHLORIDE 106  --  100   CO2 30  --  27   BUN 17  --  25   CR 0.93  --  1.13   ANIONGAP 3  --  6   ULISSES 8.3*  --  9.1   GLC 79  --  130*     Recent Results (from the past 24 hour(s))   XR Video Swallow with SLP or OT    Narrative    VIDEO SWALLOWING EVALUATION   1/12/2022 9:20 AM     HISTORY: Dysphagia, weight loss.    COMPARISON: 5/31/2020    FLUOROSCOPY TIME: 2.2 minutes.  SPOT IMAGES OR CINE RUNS: 5    FINDINGS:    Thin: Aspiration    Mildly thick: Penetration to the cords.    Pudding: No penetration or aspiration.    Refer to speech  pathology report for additional details.    RAJI JACKSON MD         SYSTEM ID:  [id number]

## 2022-01-12 NOTE — PROGRESS NOTES
"Hospitalist note    Discussed w/ pt's son for nearly 25 minutes on the phone providing update on diagnostic results from earlier in the day.  Patient apparently spends a lot of time in bed is quite sedentary although the son encourages him to eat well and exercise is much as he can.  Apparently in December 2021 patient's son was told by TAVR team that he is not a candidate because of poor femoral access.  It does not appear that this was told specifically to the patient.  I did briefly discuss the high risk of mortality in relatively short period of time with out valvular repair given the severity of the valve area.  Son seems realistic but at times is hopeful for perhaps a second opinion or that may be the risk of TAVR may outweigh benefits.    He is hopeful to \"keep dad off the ventilator\" as he experienced his mother being ventilated and likely had withdrawal of life supporting therapy.  He does not want his dad to go through this.  Accordingly I changed the CODE STATUS to DNI.  I recommended DNR but son wanted to speak with patient about this prior to making that change.  son also wanted to speak with patient about palliative consultation.  I described this as a team that can help patient live as well as is possible in the setting of a life limiting illness, in this case would be aortic stenosis.    Cee Thomason, CNP  Hospitalist - [facility]  [phone]     Text Page         "

## 2022-01-12 NOTE — PLAN OF CARE
RN:  Assumed care of patient at 1500.  Patient A/O x3-4.  Low blood pressure noted.  Hospitalist updated.  LR bolus infused per orders.  Other VSS on RA.  Denied pain.  Able to call and state needs.  Fair appetite.  Tolerating pureed diet.  IV saline locked--right AC.  Up with SBA/GB/walker.  Uses urinal at bedside.  Monitoring stools.  PCD's on.  Son Alfredo involved in treatment and discharge planning. Discharge pending completion of tests and consults.  Patient  due to have a video swallow tomorrow.

## 2022-01-12 NOTE — PROGRESS NOTES
"   01/12/22 0953   General Information   Onset of Illness/Injury or Date of Surgery 01/10/22   Referring Physician Dr. Jonathan Franks   Patient/Family Therapy Goal Statement (SLP) Not stated   Pertinent History of Current Problem From clinical swallow evaluation notes 5/29/2020: Efrem Ramos is a 74 year old male with past medical history significant for MEL, PVD, HLD, CVA 2015, atrial fibrillation, severe aortic stenosis and mild to moderate mitral valve stenosis admitted on 5/18/2020. He underwent and elective cardiac catheterization and RCA intervention prior to TAVR. 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 Latest VFSS in 2018 found oropharyngeal and sx of esophageal dysphagia with cricopharyngeal prominence. EG at this time also found Benign-appearing esophageal stenosis and ?esophageal spasm. dysphagia diet level 2 with thin liquids with CHIN TUCK and DOUBLE SWALLOW, eating only when alert and up to chair was recommended at that time.   General Observations GI note 1/11/2022: HPI:  Efrem Ramos is a 76 year old male with a past medical history of CVA, pneumonia, pulmonary fibrosis, CAD, esophageal stricture who presents with several weeks of fatigue, 8 lb weight loss, increasing memory loss since wife's death in September.  Patient presented initially to the urgent care due to these symptoms. Blood work revealed an elevated WBC count and he was sent to the ED.  Patient describes significant difficulty with chewing food due to poor dentition.  He also frequently feels as though food has difficulty with passing down and develops choking and coughing episodes.  He denies monica esophageal " dysphagia and does not feel food get stuck in his throat or develop chest pain or discomfort while eating. Chest x-ray shows asymmetric interstitial prominence in the right upper lung with a mild interstitial prominence in the right upper lung with pneumonitis not excluded.  CT Head showed no acute intracranial pathology.  Follow up CT Chest show progressed emphysema, progressed fibrotic changes with bronchiectasis and increased interstitial consolidation consistent with underlying interstitial pneumonitis.  Nonspecific, increasing mediastinal lymphadenopathy.  No discrete pulmonary mass. Patient reports respiratory illness 3 weeks ago which has now resolved.  Denies fever, chills.  Previous biopsy diagnosed pneumonia in 2016 with pulmonary fibrosis on 2/2020 imaging. As above, patient has a history of esophageal stricture with EGD in 2018 with benign-appearing esophageal stenosis with minimal luminal narrowing and unlikely cause of swallowing difficulty.  VFSS 5/2020 with no aspiration but some penetration.  Similar findings on 9/2018 VFSS.  COVID-19 and influenza negative. Plan esophagram, possible EGD pending results. Esophagram completed 1/11  reporting - Severe tertiary contractions and esophageal dysmotility.   Type of Evaluation   Type of Evaluation Swallow Evaluation   General Swallowing Observations   Swallowing Evaluation Videofluoroscopic swallow study (VFSS)   VFSS Evaluation   Radiologist Dr. Ariza    Views Taken left lateral   Physical Location of Procedure [facility]   VFSS Textures Trialed thin liquids;mildly thick liquids;pureed   VFSS Eval: Thin Liquid Texture Trial   Mode of Presentation, Thin Liquid cup;self-fed;fed by clinician   Order of Presentation 8 11 (asp)   Preparatory Phase WFL   Oral Phase, Thin Liquid Effortful AP movement;Residue in oral cavity;Premature pharyngeal entry   Pharyngeal Phase, Thin Liquid Delayed swallow reflex;Residue in valleculae;Residue in pyriform  sinus   Rosenbek's Penetration Aspiration Scale: Thin Liquid Trial Results 8 - contrast passes glottis, visible subglottic residue remains, absent patient response (aspiration)   Response to Aspiration absent response, silent aspiration   Diagnostic Statement Deep penetration and silent aspiration with thin liquids. Chin tuck and supraglottic swallow were ineffective    VFSS Eval: Mildly Thick Liquids   Mode of Presentation cup;self-fed   Order of Presentation 9 12 13   Preparatory Phase WFL   Oral Phase Effortful AP movement;Residue in oral cavity   Pharyngeal Phase Delayed swallow reflex;Residue in valleculae;Residue in pyriform sinus   Rosenbek's Penetration Aspiration Scale 5 - contrast contacts vocal cords, visible residue remains (penetration)   Response to Aspiration   (no overt response to deep penetration )   Successful Strategies Trialed During Procedure   (Supraglottic swallow)   Diagnostic Statement Deep penetration contacting the vocal cords. Use of supraglottic swallow reduces but does not eliminate the risk for aspiration   VFSS Eval: Moderately Thick Liquids   Diagnostic Statement Not trialed given increased amounts of residue with increase in viscosity    VFSS Evaluation: Puree Solid Texture Trial   Mode of Presentation, Puree spoon;fed by clinician   Order of Presentation 10   Preparatory Phase WFL   Oral Phase, Puree Effortful AP movement;Residue in oral cavity   Pharyngeal Phase, Puree Delayed swallow reflex;Residue in valleculae;Residue in pyriform sinus   Rosenbek's Penetration Aspiration Scale: Puree Food Trial Results 1 - no aspiration, contrast does not enter airway   Diagnostic Statement Prolonged and effortful propulsion and swallow initiation. No penetration or aspiration. Moderate-significant amounts of pharyngeal residue    VFSS Evaluation: Solid Food Texture Trial   Diagnostic Statement Not trialed d/t effort required to manipulation puree, and pt politely declining    Esophageal Phase  of Swallow   Patient reports or presents with symptoms of esophageal dysphagia Yes   Esophageal comments Esophagram - Severe tertiary contractions and esophageal dysmotility.   Swallowing Recommendations   Diet Consistency Recommendations pureed (level 4);mildly thick liquids (level 2)   Supervision Level for Intake distant supervision needed   Mode of Delivery Recommendations bolus size, small;slow rate of intake   Postural Recommendations   (Supraglottic swallow - Cough, swallow, re swallow)   Swallowing Maneuver Recommendations alternate food and liquid intake;effortful (hard) swallow;extra swallow;supraglottic swallow   Monitoring/Assistance Required (Eating/Swallowing) stop eating activities when fatigue is present;monitor for cough or change in vocal quality with intake   Recommended Feeding/Eating Techniques (Swallow Eval) maintain upright sitting position for eating;maintain upright posture during/after eating for 30 minutes;minimize distractions during oral intake;provide 6 smaller meals throughout day   Medication Administration Recommendations, Swallowing (SLP) With mildly thick or in puree   Instrumental Assessment Recommendations   (Repeat video swallow in approx 2 weeks, pending GOC)   General Therapy Interventions   Planned Therapy Interventions Dysphagia Treatment   Dysphagia treatment Oropharyngeal exercise training;Modified diet education;Instruction of safe swallow strategies   SLP Therapy Assessment/Plan   Criteria for Skilled Therapeutic Interventions Met (SLP Eval) yes;treatment indicated   SLP Diagnosis Moderate-severe oropharyngeal dysphagia    Rehab Potential (SLP Eval) fair, will monitor progress closely   Therapy Frequency (SLP Eval) 5 times/wk   Predicted Duration of Therapy Intervention (SLP Eval) 2 weeks   Comment, Therapy Assessment/Plan (SLP) Pt seen for video swallow evaluation. He was assessed with various textures/consistencies of barium under fluoroscopy with radiologist present.  Pt's swallow is notable for significant weakness, prolonged and effortful propulsion, reduced tongue base retraction, impaired epiglottic inversion and pharyngeal constriction. Silent aspiration occurs with thin liquids. Deep penetration with mildly thick liquids, supraglottic swallow (swallow, cough, re swallow) reduces but does not eliminate risk for aspiration. Puree is notable for significant residue but multiple swallows reduces the amount of residue. Moderate-severe oropharyngeal dysphagia. Recommend puree (4) and mildly thick liquids (2) with a supraglottic swallow - swallow, cough, re swalow. Pt should be seated upright, remain upright for 30-60 minutes after PO, eat smaller more frequent meals, complete hard swallows at a slow rate. Diet can be liberalized if goals of care were to change. Continue SLP services for dysphagia.    Therapy Plan Review/Discharge Plan (SLP)   Therapy Plan Review (SLP) evaluation/treatment results reviewed;care plan/treatment goals reviewed;risks/benefits reviewed;participants voiced agreement with care plan;participants included;patient   SLP Discharge Planning    SLP Discharge Recommendation (DC Rec) home with home care speech therapy;home with outpatient speech therapy   SLP Rationale for DC Rec Dysphagia, acute on chronic    SLP Brief overview of current status   Moderate-severe oropharyngeal dysphagia. Recommend puree (4) and mildly thick liquids (2) with a supraglottic swallow - swallow, cough, re swalow. Pt should be seated upright, remain upright for 30-60 minutes after PO, eat smaller more frequent meals, complete hard swallows at a slow rate. Diet can be liberalized if goals of care were to change. Continue SLP services for dysphagia.     Total Evaluation Time   Total Evaluation Time (Minutes) 20

## 2022-01-12 NOTE — PROVIDER NOTIFICATION
MD Notification    Notified Person: MD    Notified Person Name: Ramesh Marie     Notification Date/Time: 1/12/2022 at 0349    Notification Interaction: AMCOM     Purpose of Notification: bp THIERRY is 88/50     Orders Received:    Comments:

## 2022-01-13 NOTE — PLAN OF CARE
Shift: [id number]    Orientation: A&Ox4   Activity: SBA/GB/walker   Vitals Signs: VSS on RA   Cardiac: WDL, denies chest pain   Respiratory: Denies SOB. Infrequent productive cough.   GI/: Voiding, BM x1. Continent.   Diet/Appetite: Pureed level 4, thickened fluids level 2. 50% intake, pushed fluids.   Pain: Denies   Skin: WDL   Lines: PIV SL   Consults/Imaging: Pulmonology   Labs: WBC- 14.0, continue to monitor   Discharge Plan: IVIS following, possible TCU placement

## 2022-01-13 NOTE — PLAN OF CARE
Code status: Full  Orientation: AO  Activity Level: SBA GB/W  Fall Risk: yes   Behavior & Aggression Tool Color: green   Pain Management: denies pain  ABNL VS/O2: VSS RA, soft BP  Diet: Pureed level 4, thickened fluids level 2- speech consulted  Skin: intact  Bowel/Bladder: continent.   Drains/Devices: L PIV: SL: IV abx: + sputum culture  Tests/Procedures for next shift: Pulmonology consult  Anticipated DC date: CHUN: video swallow completed; PT assessment complete; hema/occ consulted, pulmonology consult pending; sputum cultures positive: antibiotic initiated: Continue to monitor.  Pt to be upright in chair w/ meals.

## 2022-01-13 NOTE — CONSULTS
PULMONOLOGY CONSULTATION  Date of service: 1/13/2022    [facility]    _____________________________________________________    Efrem Ramos  76 year old male  [id number]  [address] [facility] [address]    Primary Care Provider:  Danny Paige  Admission Date: 1/10/2022  Hospital Attending Physician:  Jonathan Franks MD  ________________________________________    CHIEF COMPLAINT : I was asked to see this patient by Dr.Paul Farley for evaluation of pulmonary infiltrate, pulmonary fibrosis, cough, dyspnea.  Informant: EHR and patient    HISTORY OF PRESENT ILLNESS     Efrem Ramos is a 76 year old male with past medical history significant for aortic stenosis, A. fib flutter, hypertension, coronary artery disease, pulmonary fibrosis, and dysphagia who presented with leukocytosis, increasing cough fatigue with poor oral intake.  Patient's reportedly had a respiratory illness proximately 2 weeks ago, with ongoing productive cough and dyspnea on exertion.  Seen in urgent care, his white blood count was elevated 20, and he was referred for further evaluation and treatment.    On admission, his white blood count was elevated 21.7 thousand and COVID test was negative.    CT scan demonstrated increased interstitial infiltrates, most of honeycombing in the right suggestive of progressive fibrosis, but also may be consistent with acute infection.  Underlying emphysema noted also.    Cough is productive of a small amount of sputum which he has not examined.      HOME MEDICATIONS   Pulmonary meds: None  Home Oxygen: None  Medications Prior to Admission   Medication Sig Dispense Refill Last Dose     acetaminophen (TYLENOL) 325 MG tablet Take 325-650 mg by mouth every 6 hours as needed for mild pain    Past Month at Unknown time     apixaban ANTICOAGULANT (ELIQUIS) 5 MG tablet Take 1 tablet (5 mg) by mouth 2 times daily 180 tablet 3 1/10/2022 at AM     atorvastatin (LIPITOR) 40 MG  tablet Take 1 tablet (40 mg) by mouth daily 90 tablet 3 1/10/2022 at Unknown time     budesonide (ENTOCORT EC) 3 MG EC capsule 9 mg daily x 6 weeks, then 6 mg daily x 2 weeks, then 3 mg daily x 2 weeks 126 capsule 0 1/10/2022 at 3 mg     calcium carbonate 600 mg-vitamin D 400 units (CALTRATE) 600-400 MG-UNIT per tablet Take 1 tablet by mouth 2 times daily    1/10/2022 at AM     clopidogrel (PLAVIX) 75 MG tablet Take 1 tablet (75 mg) by mouth daily 90 tablet 1 1/10/2022 at Unknown time     digoxin (LANOXIN) 125 MCG tablet Take 1 tablet (125 mcg) by mouth daily 90 tablet 3 1/10/2022 at Unknown time     furosemide (LASIX) 20 MG tablet Take 1 tablet (20 mg) by mouth daily AND 0.5 tablets (10 mg) daily (with lunch). 135 tablet 3 1/10/2022 at AM     gabapentin (NEURONTIN) 300 MG capsule TAKE 2 CAPSULES BY MOUTH EVERY EVENING 180 capsule 3 1/9/2022 at Unknown time     melatonin 1 MG TABS tablet Take 1 mg by mouth nightly as needed for sleep   1/9/2022 at Unknown time     metoprolol succinate ER (TOPROL XL) 25 MG 24 hr tablet Take 1 tablet (25 mg) by mouth daily 90 tablet 3 1/10/2022 at Unknown time     mirtazapine (REMERON) 15 MG tablet Take 1 tablet (15 mg) by mouth At Bedtime 90 tablet 3 1/9/2022 at Unknown time     multivitamin (OCUVITE) TABS Take 1 tablet by mouth daily    1/10/2022 at Unknown time     potassium chloride ER (K-TAB/KLOR-CON) 10 MEQ CR tablet Take 1 tablet (10 mEq) by mouth daily 90 tablet 3 1/10/2022 at Unknown time     senna-docusate (SENOKOT-S/PERICOLACE) 8.6-50 MG tablet Take 2 tablets by mouth 2 times daily (Patient taking differently: Take 2 tablets by mouth 2 times daily as needed for constipation ) 120 tablet 3 Past Week at Unknown time       PAST MEDICAL HISTORY      Past Medical History:   Diagnosis Date     Atrial fibrillation (H)     amiodarone therapy discontinued due to pulmonary toxicity     Atrial flutter (H)      Benign essential hypertension 11/20/2018     Cancer (H) vocal cord      Carpal tunnel syndrome     abstracted 7/3/02.     Coronary artery disease involving native coronary artery of native heart without angina pectoris 5/8/2020    Cath 5/28/2020: patent stents; Cath 5/18/2020: ISABEL x4 to RCA; Cath 3/2020: 1 vessel disease; Cath 2017: moderate 2 vessel disease     CVA (cerebral infarction) 5/5/2015     Demyelinating disease of central nervous system, unspecified (H)     abstracted 7/3/02.     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     Pneumonia 8/17/2016     PVD (peripheral vascular disease) (H)      Redundant colon     needs CT colonography     Shingles      SKIN DISORDERS NEC 3/15/2005     Sleep apnea      Sleep apnea      SVT (supraventricular tachycardia) (H)      Past Surgical History:   Procedure Laterality Date     BIOPSY  brain 2002     BONE MARROW BIOPSY, BONE SPECIMEN, NEEDLE/TROCAR N/A 6/8/2017    Procedure: BIOPSY BONE MARROW;  UNILATERAL BONE MARROW BIOPSY (CONSCIOUS SEDATION) ;  Surgeon: Jamie Gonzales MD;  Location:  GI     CARDIAC CATHERIZATION  09/05/2017    2V CAD, IFR of RCA 0.95     CV CORONARY ANGIOGRAM N/A 3/23/2020    Procedure: Coronary Angiogram and right heart cath;  Surgeon: Gino Ferrer MD;  Location:  HEART CARDIAC CATH LAB     CV HEART CATHETERIZATION WITH POSSIBLE INTERVENTION N/A 5/28/2020    Procedure: Heart Catheterization  with Possible Intervention;  Surgeon: Larry Chawla MD;  Location:  HEART CARDIAC CATH LAB     CV HEART CATHETERIZATION WITH POSSIBLE INTERVENTION N/A 5/18/2020    Procedure: Heart Catheterization with Possible Intervention;  Surgeon: Gaurang Swenson MD;  Location:  HEART CARDIAC CATH LAB     CV INSTANTANEOUS WAVE-FREE RATIO N/A 3/23/2020    Procedure: Instantaneous Wave-Free Ratio;  Surgeon: Gino Ferrer MD;  Location:  HEART CARDIAC CATH LAB     CV PCI ATHERECTOMY ORBITAL N/A 5/18/2020    Procedure: Percutaneous Coronary Intervention Atherectomy Rotational;  Surgeon: Gaurang Swenson MD;  Location:  HEART CARDIAC CATH LAB     CV PCI STENT DRUG ELUTING N/A 5/18/2020    Procedure: Percutaneous Coronary Intervention Stent Drug Eluting;  Surgeon: Gaurang Swenson MD;  Location:  HEART CARDIAC CATH LAB     CV RIGHT HEART CATH MEASUREMENTS RECORDED N/A 5/28/2020    Procedure: Right Heart Cath;  Surgeon: Larry Chawla MD;  Location:  HEART CARDIAC CATH LAB     CV TEMPORARY PACEMAKER INSERTION N/A 5/18/2020    Procedure: Temporary Pacemaker Insertion;  Surgeon: Gaurang Swenson MD;  Location:  HEART CARDIAC CATH LAB     ESOPHAGOSCOPY, GASTROSCOPY, DUODENOSCOPY (EGD), COMBINED N/A 9/8/2018    Procedure: COMBINED ESOPHAGOSCOPY, GASTROSCOPY, DUODENOSCOPY (EGD), BIOPSY SINGLE OR MULTIPLE;;  Surgeon: Xander Marie MD;  Location:  GI     HEAD & NECK SURGERY       ORTHOPEDIC SURGERY      right arm ulna reset after injury     THORACOSCOPIC WEDGE RESECTION LUNG Right 8/2/2016    Procedure: THORACOSCOPIC WEDGE RESECTION LUNG;  Surgeon: Abdelrahman Noriega MD;  Location:  OR     [facility] NONSPECIFIC PROCEDURE  as a child    T & A. abstracted 7/3/02.     [facility] NONSPECIFIC PROCEDURE  early    CTR. abstracted 7/3/02.       ALLERGIES     Allergies   Allergen Reactions     Sulfa Drugs Difficulty breathing, Swelling and Hives     Adhesive Tape Blisters     Amiodarone  Other (See Comments)     Developed pleural effusion     Penicillins Other (See Comments)     Reaction occurred as a child  Other reaction(s): Hives       SOCIAL / SUBSTANCE HISTORY     Social History     Socioeconomic History     Marital status:      Spouse name: Not on file     Number of children: Not on file     Years of education: Not on file     Highest education level: Not on file   Occupational History     Not on file   Tobacco Use     Smoking status: Former Smoker     Packs/day: 1.00     Years: 30.00     Pack years: 30.00     Types: Cigarettes     Quit date: 2013     Years since quittin.4     Smokeless tobacco: Never Used   Substance and Sexual Activity     Alcohol use: Not Currently     Alcohol/week: 42.0 standard drinks     Types: 42 Standard drinks or equivalent per week     Drug use: No     Sexual activity: Not Currently   Other Topics Concern     Parent/sibling w/ CABG, MI or angioplasty before 65F 55M? Not Asked      Service Not Asked     Blood Transfusions Not Asked     Caffeine Concern Yes     Comment: 1 Coke occasionally      Occupational Exposure Not Asked     Hobby Hazards Not Asked     Sleep Concern Not Asked     Stress Concern Not Asked     Weight Concern Not Asked     Special Diet No     Back Care Not Asked     Exercise No     Bike Helmet Not Asked     Seat Belt Not Asked     Self-Exams Not Asked   Social History Narrative    Retired (disability)      Social Determinants of Health     Financial Resource Strain: Not on file   Food Insecurity: Not on file   Transportation Needs: Not on file   Physical Activity: Not on file   Stress: Not on file   Social Connections: Not on file   Intimate Partner Violence: Not on file   Housing Stability: Not on file       FAMILY HISTORY     Family History   Problem Relation Age of Onset     Blood Disease Mother         Anemia     Cardiovascular Father      Cancer - colorectal Maternal Grandfather      Hypertension Brother   "    Diabetes Sister 5        Constance Diabetes passed at 36     Respiratory Other         Lung Cancer       REVIEW OF SYSTEMS   A comprehensive review of systems was negative except for items noted in HPI/Subjective.    PHYSICAL EXAMINATION   Temp (24hrs), Av.3  F (36.8  C), Min:98  F (36.7  C), Max:98.5  F (36.9  C)    Vital signs:  Temp: 98.5  F (36.9  C) Temp src: Oral BP: (!) 88/59 Pulse: 92   Resp: 16 SpO2: 92 % O2 Device: None (Room air)   Height: 175.3 cm (5' 9\") Weight: 59.5 kg (131 lb 3.2 oz)  Estimated body mass index is 19.37 kg/m  as calculated from the following:    Height as of this encounter: 1.753 m (5' 9\").    Weight as of this encounter: 59.5 kg (131 lb 3.2 oz).        I/O last 3 completed shifts:  In: 60 [P.O.:60]  Out: 625 [Urine:625]    CONSTITUTIONAL/GENERAL APPEARANCE: Alert male. No Apparent Distress.  PSYCHIATRIC: Pleasant and appropriate mood and affect. Oriented x 3.  EARS, NOSE,THROAT,MOUTH: External ears and nose overall normal.  Moist oral mucosa.   NECK: Neck appearance normal. No neck masses and the thyroid not enlarged.   RESPIRATORY: Non-labored effort.  Breath sounds are decreased, with inspiratory crackles heard particularly at the left base.  No wheezing appreciated.  CARDIOVASCULAR: Cardiovascular exam was irregularly irregular, with systolic murmur grade 3 out of 6 consistent with aortic stenosis. Extremities with no edema.  ABDOMEN: round, soft, non-tender, non-distended, no palpable masses. No presence of hernia.  LYMPHATIC: no cervical or axillary lymphadenopathy.  SKIN: Skin color was normal. No clubbing or cyanosis. No palpable skin abnormalities.    LABORATORY ASSESSMENT    Arterial Blood GasNo lab results found in last 7 days.  CBC  Recent Labs   Lab 22  0623 22  0608 22  0917 01/10/22  1748   WBC 12.7* 14.0* 14.4* 21.7*   RBC 3.18* 3.35* 3.78* 4.26*   HGB 10.2* 10.8* 12.0* 13.5   HCT 31.7* 32.7* 37.0* 43.7    98 98 103*   MCH 32.1 32.2 31.7 " 31.7   MCHC 32.2 33.0 32.4 30.9*   RDW 14.2 14.0 14.0 13.9    303 333 422     BMP  Recent Labs   Lab 01/13/22  0623 01/12/22  0608 01/10/22  1748    139 133   POTASSIUM 3.3* 3.6 3.7   CHLORIDE 108 106 100   ULISSES 8.0* 8.3* 9.1   CO2 26 30 27   BUN 13 17 25   CR 0.81 0.93 1.13   GLC 87 79 130*     INRNo lab results found in last 7 days.   BNPNo lab results found in last 7 days.  VENOUS BLOOD GASESNo lab results found in last 7 days.      Additional labs and/or comments:     IMAGING    CT scan 1/11/2022  IMPRESSION:   1.  Progressed emphysema.   2.  Progressed fibrotic changes with bronchiectasis and increased interstitial consolidation consistent with underlying usual interstitial pneumonitis, correlate to exclude superimposed infection.  3.  Nonspecific, increasing mediastinal lymphadenopathy, correlate to exclude underlying malignancy or lymphoproliferative cause. No discrete pulmonary mass.  4.  Mildly enlarged heart with coronary artery disease and atherosclerotic vascular disease.      PFT & OTHER TESTING       ASSESSMENT / PLAN      Pulmonary diagnoses:  Abnl CT/CXR R91.8  COPD J44.9  Cough R05  Emphysema J43.9  Interstitial pneum J84.111  Pulmonary fibrosis NOS J84.10  SOB R06.02    Additional COVID-19 diagnoses:  Concern of possible exposure to COVID-19, Now RULED OUT Z03.818      ASSESSMENT : 76-year-old gentleman, history of mild pulmonary fibrotic changes in the past as well as emphysema with evidence of bronchiectasis, presents with complaints of increasing cough and shortness of breath following respiratory illness of approximately 2 weeks duration.  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  PLAN:     Recommended continued treatment of Moraxella catarrhalis with ceftriaxone.  Continue to follow clinically.        Discussed briefly with  Dr. Mariia Martinez M.D.  [facility]  [facility]  [phone]  Pager [phone]

## 2022-01-13 NOTE — PROGRESS NOTES
"SP[facility]UAL [facility] Progress Note  [facility]    Follow-Up visit per staff request.    Upon inquiry, Alfredo shared that he didn't have any concerns to discuss.     He spoke of still not knowing what's \"going on with him\" medically, and he is simply waiting for things to sort themselves out. He also said he's looking forward to his son's visit later today.    He was watching tv, so we shared a brief general conversation, including that he enjoys watching some news and mystery shows.    I offered emotional support, affirmed experiences, and sang a blessing.    SH remains available.    Cherise Richards  Associate   "

## 2022-01-13 NOTE — PROGRESS NOTES
"Care Management Follow Up    Length of Stay (days): 2    Expected Discharge Date: 01/13/2022     Concerns to be Addressed: discharge planning     Patient plan of care discussed at interdisciplinary rounds: Yes    Anticipated Discharge Disposition: Home,Outpatient Rehab (PT, OT, SLP, Cardiac or Pulmonary)  Anticipated Discharge Services: None  Anticipated Discharge DME: None    Patient/family educated on Medicare website which has current facility and service quality ratings: no  Education Provided on the Discharge Plan:    Patient/Family in Agreement with the Plan: yes    Referrals Placed by CM/SW:    Private pay costs discussed: Not applicable    Additional Information:  [facility] called Efrem (son) to discuss discharge planning. Efrem stated, \"we don't even know what the problem is.\" Efrem would like more information before they can make a decision on home care vs. TCU. [facility] will continue to follow, and will assist in discharge planning once a care plan is formed. Efrem plans on speaking with hospitalist/bedside nurse this evening.     THEO Horton      "

## 2022-01-13 NOTE — PROGRESS NOTES
[facility]    Hematology/Oncology Follow-up Note      Today's Date: 01/13/22  Date of Admission:  1/10/2022  Reason for Consultation: MGUS      ASSESSMENT/PLAN:  Efrem Ramos is a 76 year old male well known to Dr. Hurley and with history of severe aortic stenosis, CAD, pulmonary fibrosis, dysphagia, and MGUS. He is currently admitted for generalized weakness, anorexia, weight loss, dysphagia, possible pneumonia.     1. Monoclonal gammopathy of undetermined significance, IgG kappa  --MGUS initial diagnosis in 2013 when his M-spike was 0.6 g/dL.    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   --Last bone survey from 2/16/2021 showed no lytic lesions.  --Current SPIEP showing M-protein of 1.3 g/dL. Kappa/lambda ratio of 3.76. I discussed with patient possibility of repeat bone marrow biopsy in the near future. He is uncertain at this time if he would proceed. He would like to recover from the current hospitalization and further discuss with family.   --Patient will follow up with Dr. Hurley outpatient to further discuss monitoring vs biopsy. Hemoglobin upon admission was 13, currently 10 (see below). No evidence of hypercalcemia or renal dysfunction.     2. Mild mediastinal lymphadenopathy  --May be reactive to infection or inflammation given current symptomatology.  --Repeat chest CT in 3 months to reevaluate mediastinal nodes -- if increasing adenopathy, will discuss biopsy with patient.     3. Leukocytosis with neutrophilia  --Persistent since 5/2020.  --Peripheral blood smear showed normochromic normocytic anemia with absolute neutrophilia and left shift. Rouleax not evident. No atypical or dysplastic cells are observed.  --Likely reactive in  context of infection suspicion.     4. Worsening pulmonary fibrosis  5. Sputum positive for Moraxella  --Await pulmonary consult.     6. Normochromic normocytic anemia  --Admission Hgb normal, has since decreased likely related to blood draws, acute illness.  --Folate, B12, iron stores adequate.     7. Severe malnutrition    Thank you for the consultation. Hematology to sign off at this time. Will schedule follow up with Dr. Hurley outpatient. Please do not hesitate to contact our team with any further questions or concerns.     Halie Lawrence,   Hematology/Oncology  [facility]        INTERIM HISTORY:  No acute events overnight. HD stable, afebrile. Patient on room air at the time of my evaluation. He is witnessed to ambulate to the bathroom with the use of a walker and minimal assistance from RN. No complaints of pain.      MEDICATIONS:  Current Facility-Administered Medications   Medication     acetaminophen (TYLENOL) tablet 650 mg    Or     acetaminophen (TYLENOL) Suppository 650 mg     apixaban ANTICOAGULANT (ELIQUIS) tablet 5 mg     atorvastatin (LIPITOR) tablet 40 mg     budesonide (ENTOCORT EC) EC capsule 3 mg     cefTRIAXone (ROCEPHIN) 2 g vial to attach to  ml bag for ADULTS or NS 50 ml bag for PEDS     clopidogrel (PLAVIX) tablet 75 mg     digoxin (LANOXIN) tablet 125 mcg     folic acid-vit B6-vit B12 (FOLGARD) per tablet 1 tablet     gabapentin (NEURONTIN) capsule 300 mg     [Held by provider] HYDROmorphone (DILAUDID) injection 0.2 mg     lidocaine (LMX4) cream     lidocaine 1 % 0.1-1 mL     melatonin tablet 1 mg     metoprolol succinate ER (TOPROL-XL) 24 hr tablet 25 mg     mirtazapine (REMERON) tablet 15 mg     naloxone (NARCAN) injection 0.2 mg    Or     naloxone (NARCAN) injection 0.4 mg    Or     naloxone (NARCAN) injection 0.2 mg    Or     naloxone (NARCAN) injection 0.4 mg     ondansetron (ZOFRAN-ODT) ODT tab 4 mg    Or     ondansetron (ZOFRAN) injection 4 mg     sodium  "chloride (PF) 0.9% PF flush 3 mL     sodium chloride (PF) 0.9% PF flush 3 mL           ALLERGIES:  Allergies   Allergen Reactions     Sulfa Drugs Difficulty breathing, Swelling and Hives     Adhesive Tape Blisters     Amiodarone Other (See Comments)     Developed pleural effusion     Penicillins Other (See Comments)     Reaction occurred as a child  Other reaction(s): Hives         PHYSICAL EXAM:  Vital signs:  Temp: 98.5  F (36.9  C) Temp src: Oral BP: 94/65 Pulse: 97   Resp: 16 SpO2: 93 % O2 Device: None (Room air)   Height: 175.3 cm (5' 9\") Weight: 59.5 kg (131 lb 3.2 oz)  Estimated body mass index is 19.37 kg/m  as calculated from the following:    Height as of this encounter: 1.753 m (5' 9\").    Weight as of this encounter: 59.5 kg (131 lb 3.2 oz).    ECO.5  GENERAL/CONSTITUTIONAL: No acute distress. Chronically-ill appearing, thin, frail.  EYES: Pupils are equal, round, and react to light and accommodation. Extraocular movements intact.  No scleral icterus.  ENT/MOUTH: Neck supple. Oropharynx clear, no mucositis.  LYMPH: No anterior cervical, posterior cervical, supraclavicular, axillary or inguinal adenopathy.   RESPIRATORY: Diminished B/L. Accessory muscle use.  CARDIOVASCULAR: Regular rate and rhythm without murmurs, gallops, or rubs.  GASTROINTESTINAL: No hepatosplenomegaly, masses, or tenderness. The patient has normal bowel sounds. No guarding.  No distention.  MUSCULOSKELETAL: Warm and well-perfused, no cyanosis, clubbing, or edema.  NEUROLOGIC: Cranial nerves II-XII are intact. Alert, oriented, answers questions appropriately.  INTEGUMENTARY: No rashes or jaundice.  GAIT: Steady, does not use assistive device      LABS:  CBC RESULTS:   Recent Labs   Lab Test 22  0623   WBC 12.7*   RBC 3.18*   HGB 10.2*   HCT 31.7*      MCH 32.1   MCHC 32.2   RDW 14.2          Recent Labs   Lab Test 22  0623 22  0608    139   POTASSIUM 3.3* 3.6   CHLORIDE 108 106   CO2 26 30 "   ANIONGAP 6 3   GLC 87 79   BUN 13 17   CR 0.81 0.93   ULISSES 8.0* 8.3*      Ref. Range 1/12/2022 18:38   Ferritin Latest Ref Range: 26 - 388 ng/mL 642 (H)   Folate Latest Ref Range: >=5.4 ng/mL 28.1   Iron Latest Ref Range: 35 - 180 ug/dL 36   Iron Binding Cap Latest Ref Range: 240 - 430 ug/dL 202 (L)   Iron Saturation Index Latest Ref Range: 15 - 46 % 18   Vitamin B12 Latest Ref Range: 193 - 986 pg/mL 1,043 (H)        Ref. Range 1/12/2022 18:38   Albumin Fraction Latest Ref Range: 3.7 - 5.1 g/dL 2.9 (L)   Alpha 1 Fraction Latest Ref Range: 0.2 - 0.4 g/dL 0.5 (H)   Alpha 2 Fraction Latest Ref Range: 0.5 - 0.9 g/dL 1.0 (H)   Beta Fraction Latest Ref Range: 0.6 - 1.0 g/dL 0.7   ELP Interpretation: Unknown Monoclonal protein (1.3 g/dL) seen in the gamma fraction. Previously characterized in our laboratory on 2/16/21 as a monoclonal IgG immunoglobulin of kappa light chain type   Gamma Fraction Latest Ref Range: 0.7 - 1.6 g/dL 2.3 (H)   IGG Latest Ref Range: 610-1,616 mg/dL 2,276 (H)   Monoclonal Peak Latest Ref Range: <=0.0 g/dL 1.3 (H)   Kappa Free Light Chains Latest Ref Range: 0.33 - 1.94 mg/dL 12.96 (H)   LAMBDA FREE LT CHAINS Latest Ref Range: 0.57 - 2.63 mg/dL 3.45 (H)   KAPPA/LAMBDA RATIO Latest Ref Range: 0.26 - 1.65  3.76 (H)   Total Protein Serum for ELP Latest Ref Range: 6.8 - 8.8 g/dL 7.3           Halie Lawrence DO  Hematology/Oncology  [facility]

## 2022-01-13 NOTE — PROVIDER NOTIFICATION
MD Notification    Notified Person: MD    Notified Person Name: Mariia    Notification Date/Time: 1/13/22, 1618    Notification Interaction: [employer] messaging     Purpose of Notification: THIERRY GRAHAM 3.3 today, patient not on replacement protocol. Please advise. Thanks!    Orders Received:    Comments:

## 2022-01-14 NOTE — PROGRESS NOTES
"PULMONOLOGY PROGRESS NOTE    Date of Admission: 1/10/2022    CC/Reason for Hospital visit: Pulmonary infiltrates, pulmonary fibrosis cough and dyspnea.  SUBJECTIVE      Stable night.  Patient is noncommittal whether he is any better today.  Still on room air.  Noted that he coughed up some sputum last night.        ROS: A Problem Pertinent review of systems was negative except for items noted in HPI.  Past Medical, Family, and Social/Substance History has been reviewed: No interval changes.   OBJECTIVE   Vital signs:  Temp: 98.1  F (36.7  C) Temp src: Oral BP: 97/60 Pulse: 97   Resp: 16 SpO2: 94 % O2 Device: None (Room air)   Height: 175.3 cm (5' 9\") Weight: 59.5 kg (131 lb 3.2 oz)  Estimated body mass index is 19.37 kg/m  as calculated from the following:    Height as of this encounter: 1.753 m (5' 9\").    Weight as of this encounter: 59.5 kg (131 lb 3.2 oz).        I/O last 3 completed shifts:  In: 843 [P.O.:840; I.V.:3]  Out: -       CONSTITUTIONAL/GENERAL APPEARANCE: Alert  male, sitting up in chair having breakfast no Apparent Distress.  PSYCHIATRIC: Pleasant and appropriate mood and affect. Oriented x 3.  EARS, NOSE,THROAT,MOUTH: External ears and nose overall normal. Normal oral mucosa.   NECK: Neck appearance normal. No neck masses and the thyroid is not enlarged.   RESPIRATORY: Non-labored effort.  Fine inspiratory crackles heard diffusely throughout the lung fields.  No wheezing.  CARDIOVASCULAR: Irregularly irregular with 3 out of 6 systolic murmur consistent with aortic stenosis.  . Extremities with no edema.      LABORATORY ASSESSMENT    Arterial Blood GasNo lab results found in last 7 days.  CBC  Recent Labs   Lab 01/13/22  0623 01/12/22  0608 01/11/22  0917 01/10/22  1748   WBC 12.7* 14.0* 14.4* 21.7*   RBC 3.18* 3.35* 3.78* 4.26*   HGB 10.2* 10.8* 12.0* 13.5   HCT 31.7* 32.7* 37.0* 43.7    98 98 103*   MCH 32.1 32.2 31.7 31.7   MCHC 32.2 33.0 32.4 30.9*   RDW 14.2 14.0 14.0 13.9    303 " 333 422     BMP  Recent Labs   Lab 01/14/22  0913 01/13/22  0623 01/12/22  0608 01/10/22  1748   NA  --  140 139 133   POTASSIUM 3.5 3.3* 3.6 3.7   CHLORIDE  --  108 106 100   ULISSES  --  8.0* 8.3* 9.1   CO2  --  26 30 27   BUN  --  13 17 25   CR  --  0.81 0.93 1.13   GLC  --  87 79 130*     INRNo lab results found in last 7 days.   BNPNo lab results found in last 7 days.  VENOUS BLOOD GASESNo lab results found in last 7 days.      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    IMAGING    CT scan 1/11/2022  IMPRESSION:   1.  Progressed emphysema.   2.  Progressed fibrotic changes with bronchiectasis and increased interstitial consolidation consistent with underlying usual interstitial pneumonitis, correlate to exclude superimposed infection.  3.  Nonspecific, increasing mediastinal lymphadenopathy, correlate to exclude underlying malignancy or lymphoproliferative cause. No discrete pulmonary mass.  4.  Mildly enlarged heart with coronary artery disease and atherosclerotic vascular disease.    PFT & OTHER TESTING       ASSESSMENT / PLAN      Pulmonary Diagnoses:  Abnl CT/CXR R91.8  Cough R05  Emphysema J43.9  Interstitial pneum J84.111  Pulmonary fibrosis NOS J84.10  SOB R06.02    Additional COVID-19 diagnoses:  Concern of possible exposure to COVID-19, Now RULED OUT Z03.818    ASSESSMENT : 76-year-old gentleman, history of mild pulmonary fibrotic changes in the past as well as emphysema with evidence of bronchiectasis, presents with complaints of increasing cough and shortness of breath following respiratory illness of approximately 2 weeks duration.  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  PLAN:     Recommended continued treatment of Moraxella catarrhalis.  Okay to switch to cefdinir 300 mg twice daily for total of 10 days of  antibiotic therapy.    Follow-up at [facility] 4 to 6 weeks after discharge for pulmonary function studies and follow-up care.    Will require CT scan follow-up.  Oncology has requested CT scan with IV contrast to be performed in 3 months to follow-up for lymphadenopathy in the chest.  This would also be an appropriate time to repeat the CT scan to follow-up interstitial infiltrate/pneumonitis.        Urbano Martinez M.D.  [facility]  [phone]  Pager [phone]

## 2022-01-14 NOTE — PROGRESS NOTES
[facility]    Medicine Progress Note - Hospitalist Service       Date of Admission:  1/10/2022    Assessment & Plan             Efrem Ramos is a 76 year old male admitted on 1/10/2022.   Complex past history including severe aortic stenosis, A-fib/flutter, HTN, CAD s/p PCI, pulmonary fibrosis, dysphagia among other chronic medical issues who with leukocytosis, increasing coughing, poor intake, fatigue.  Son reports patient had respiratory illness about 2 weeks ago with ongoing productive cough; son reporting increased HIGH as well as worsening oral intake.  This led to evaluation at Urgent Care with finding of leukocytosis of 20 so referred to ED.    Primarily suspect presentation is combination of possible CAP (though unclear) with deconditioning related to severe malnutrition in setting of dysphagia and esophageal dysmotility and multiple other underlying comorbidities.  May well be component of grieving vs depression given the death of his wife in September 2021.     Community Acquired Pneumonia with Moraxella Catarrhalis   Leukocytosis  Admission leukocytosis of 21.7 but procal <0.05 and afebrile without hypoxia; though reporting a productive cough with son reporting increase HIGH.  COVID/flu testing negative.   * Admission CT chest showing progressed emphysema, progressed fibrotic changes with bronchiectasis and consolidation consistent with UIP, cannot rule out infection; also noting increased LAD.  * Initially monitored off abx and WBC improved to 14 without intervention  * peripheral smear negative for dysplastic or atypical cells  - sputum culture positive for Moraxella catarrhalis - without hypoxia or fever, Pulm consulted and thinks this likely represents true infection and would explain his cough and interstitial infiltrates on imaging.   - Continue treating with ceftriaxone   - Follow CBC  - Incentive spirometry and acapella     ADEM (Acute disseminated encephalomyelitis)    Diagnosed back in the 90s. Son reports a hx of this. Will need to look into this more tomorrow.   Hospitalized for 2 months needed brain biopsy. Grant finally figured it out and was diagnosed with this. His son is wondering if this is contributing to his present issues.   - Neurology consult tomorrow      Mod-severe oropharyngeal dysphagia  Esophageal dysmotility  Hx esophageal stricture s/p dilation 2018  Hx vocal fold malignancy  Reports lack of appetite due to significant work of eating, likely related to poor dentition, dysphagia and esophageal dysmotility; noting increased difficulty with certain textures and restricting intake due to fear of choking.  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    - Pulmonary consult given worsening findings on CT     Severe valvular heart disease   severe aortic stenosis with aortic valve area of 0.8 cm  severe tricuspid regurgitation w/ resultant   chronic valvular heart failure  Most recent echocardiogram December 6, 2021 with preserved LVEF, moderately dilated right ventricle.  Patient is followed by cardiology, recently discussed at TAVR conference, with son reporting he is not felt to be a candidate due to high risk.  - continue BB, statin     Severe coronary artery disease  Rotational arthrectomy and drug-eluting stent x4 to proximal to distal RCA in May 2020.  Complicated by left femoral artery bleed with retroperitoneal and scrotal hematoma.  -Continue PTA Plavix,  atorvastatin     Persistent atrial fibrillation; [id number] of 6  - continue PTA Eliquis, digoxin, metoprolol  - Cardiac telemetry     Moderate to severe protein calorie nutrition  Most likely related to dysphagia and esophageal dysmotility.  May consider also cardiac wasting in the setting of known severe valvular heart disease.  * Has had >5% weight loss in 1 month, <75% oral intake for >1 month, has subcutaneous fat and muscle loss on exam.   - Continue Remeron 15 mg at bedtime     Monoclonal gammopathy of uncertain significance  No known lytic lesions.  Increasing mediastinal lymphadenopathy noted on admission CT imaging.    * LAD may be related to possible infection, but leukocytosis appears rather chronic and is afebrile without hypoxia  - Oncology consult as above - recommending bone marrow biopsy at some point      Collagenous colitis  - Continue PTA budesonide 3 mg daily      Fall, weakness  - consult PT     Goals of treatment  * See progress note by Cee Thomason 1/11 regarding goals of care discussion.    * Had another discussion with patient and son at bedside regarding code status 1/12.  Discussed very low likelihood of successful resuscitation or wean from vent with high risk of injury and painful final moments with CPR and strongly recommended DNR/DNI status.  He states he would like to think about it, but at this time prefers to remain FULL code.        Diet: Pureed Diet (level 4) Mildly Thick (level 2)  Snacks/Supplements Adult: Other; Chocolate Vital Cuisine daily at 2 pm; Between Meals    DVT Prophylaxis: Pneumatic Compression Devices  Bravo Catheter: Not present  Central Lines: None  Code Status: Full Code Full code per patient    Disposition Plan   Expected Discharge: 01/14/2022     Anticipated discharge location: home with help/services    Delays:     Placement - Homecare          The patient's care was discussed with the Bedside Nurse, Patient and Patient's Family.    See plan above and goals  of treatment above regarding discussion.     Lashawn Chiang  Hospitalist Service  [facility]  Securely message with the [employer] Web Console (learn more here)  Text page via un[employer] technologies Paging/Directory      ______________________________________________________________________    Interval History   He reports feeling slightly weaker today. He is not liking his pureed diet. He is coughing up a lot of phlegm. Not feeling short of breath.     Data reviewed today: I reviewed all medications, new labs and imaging results over the last 24 hours. I personally reviewed no images or EKG's today.    Physical Exam   Vital Signs: Temp: 98.3  F (36.8  C) Temp src: Oral BP: (!) 84/57 Pulse: 66   Resp: 16 SpO2: 97 % O2 Device: None (Room air)    Weight: 131 lbs 3.2 oz  General Appearance: Thin elderly male in NAD  Respiratory: respirations non-labored on room air, coarse breaths sounds bilaterally   Cardiovascular: RRR, systolic murmur   GI: soft, non-distended, non-tender   Skin: no rashes or lesions   Other: Alert and appropriate, cranial nerves grossly intact     Data   Recent Labs   Lab 01/13/22  0623 01/12/22  0608 01/11/22  0917 01/10/22  1748   WBC 12.7* 14.0* 14.4* 21.7*   HGB 10.2* 10.8* 12.0* 13.5    98 98 103*    303 333 422    139  --  133   POTASSIUM 3.3* 3.6  --  3.7   CHLORIDE 108 106  --  100   CO2 26 30  --  27   BUN 13 17  --  25   CR 0.81 0.93  --  1.13   ANIONGAP 6 3  --  6   ULISSES 8.0* 8.3*  --  9.1   GLC 87 79  --  130*     No results found for this or any previous visit (from the past 24 hour(s)).

## 2022-01-14 NOTE — PLAN OF CARE
Shift: 1900-0730    Orientation: A&Ox4, flat affect   Activity: SBA/GB/walker   Vitals Signs: VSS on RA   Cardiac: WDL  Respiratory: Infrequent productive cough.   GI/: Voiding, BM x1. Continent.   Diet/Appetite: Pureed level 4, thickened fluids level 2. Fair intake  pushed fluids.   Pain: Denies   Skin: WDL   Lines: PIV SL   Consults/Imaging: Neuro, hx of ADEM   Discharge Plan: SW following, possible TCU placement vs home care with services   Other: On IV ceftriaxone for CAP

## 2022-01-14 NOTE — PLAN OF CARE
A&Ox4. Low BP to 82/60 this AM. Gave 500cc NS bolus. Recheck BP 90s/60s. Other VSS on RA. Denies chest pain and SOB. Neuros intact except generalized weakness. Tolerating pureed diet w/ mildly thick liquid. Takes pills whole. Up Ax1 walker+GB. Seen by Neurology. MRI brain result notified to neurology. Speech and PT following. Continue to monitor.

## 2022-01-14 NOTE — PROGRESS NOTES
[facility]    Medicine Progress Note - Hospitalist Service       Date of Admission:  1/10/2022    Assessment & Plan             Efrem Ramos is a 76 year old male admitted on 1/10/2022.   Complex past history including severe aortic stenosis, A-fib/flutter, HTN, CAD s/p PCI, pulmonary fibrosis, dysphagia among other chronic medical issues who with leukocytosis, increasing coughing, poor intake, fatigue.  Son reports patient had respiratory illness about 2 weeks ago with ongoing productive cough; son reporting increased HIGH as well as worsening oral intake.  This led to evaluation at Urgent Care with finding of leukocytosis of 20 so referred to ED.    Primarily suspect presentation is combination of possible CAP (though unclear) with deconditioning related to severe malnutrition in setting of dysphagia and esophageal dysmotility and multiple other underlying comorbidities.  May well be component of grieving vs depression given the death of his wife in September 2021.     Community Acquired Pneumonia with Moraxella Catarrhalis   Leukocytosis  Admission leukocytosis of 21.7 but procal <0.05 and afebrile without hypoxia; though reporting a productive cough with son reporting increase HIGH.  COVID/flu testing negative.   * Admission CT chest showing progressed emphysema, progressed fibrotic changes with bronchiectasis and consolidation consistent with UIP, cannot rule out infection; also noting increased LAD.  * Initially monitored off abx and WBC improved to 14 without intervention  * peripheral smear negative for dysplastic or atypical cells  - sputum culture positive for Moraxella catarrhalis - without hypoxia or fever, Pulm consulted and thinks this likely represents true infection and would explain his cough and interstitial infiltrates on imaging.   - Continue treating with ceftriaxone while hospitalized - can transition to cefdinir 300mg daily for a total of 10 days of antibiotic  therapy.   - Follow CBC  - Incentive spirometry and acapella     ADEM (Acute disseminated encephalomyelitis)   Diagnosed back in the 90s. He was apparently hospitalized for 2 months prior to reaching the diagnosis. He has had residual right sided weakness and dysphagia as a result. No records of this in Care Everywhere and does not regularly follow with a neurologist. His son is wondering if this is contributing to his present issues (I.e is this recurring or getting worse). Discussed that I am not familiar with this diagnosis.   - Neurology consult placed, appreciated assistance.      Mod-severe oropharyngeal dysphagia  Esophageal dysmotility  Hx esophageal stricture s/p dilation 2018  Hx vocal fold malignancy  Reports lack of appetite due to significant work of eating, likely related to poor dentition, dysphagia and esophageal dysmotility; noting increased difficulty with certain textures and restricting intake due to fear of choking.  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    - Pulmonary consult given worsening findings on CT     Severe valvular heart disease   severe aortic stenosis with aortic valve area of 0.8 cm  severe tricuspid regurgitation w/ resultant   chronic valvular heart failure  Most recent echocardiogram December 6, 2021 with preserved LVEF, moderately dilated right ventricle.  Patient is followed by cardiology, recently discussed at TAVR conference, with son reporting he is not felt to be a  candidate due to high risk.  - continue BB, statin     Severe coronary artery disease  Rotational arthrectomy and drug-eluting stent x4 to proximal to distal RCA in May 2020.  Complicated by left femoral artery bleed with retroperitoneal and scrotal hematoma.  -Continue PTA Plavix, atorvastatin     Persistent atrial fibrillation; [id number] of 6  - continue PTA Eliquis, digoxin, metoprolol  - Cardiac telemetry     Moderate to severe protein calorie nutrition  Most likely related to dysphagia and esophageal dysmotility.  May consider also cardiac wasting in the setting of known severe valvular heart disease.  * Has had >5% weight loss in 1 month, <75% oral intake for >1 month, has subcutaneous fat and muscle loss on exam.   - Continue Remeron 15 mg at bedtime     Monoclonal gammopathy of uncertain significance  No known lytic lesions.  Increasing mediastinal lymphadenopathy noted on admission CT imaging.    * LAD may be related to possible infection, but leukocytosis appears rather chronic and is afebrile without hypoxia  - Oncology consult as above - recommending bone marrow biopsy at some point      Collagenous colitis  - Continue PTA budesonide 3 mg daily      Failure to thrive  Fall, weakness  Per son, it sounds like the patient has been declining functionally for the past several weeks. I suspect this is multifactorial in the setting of possible pneumonia, protein calorie malnutrition, severe aortic stenosis, and depression/grief from the recent passing of his wife.    PT consulted during this admission, but today (1/14) the patient declined to work with PT. Discussed that if he wants to get any stronger he needs to work with therapy.   - consult PT    Goals of treatment  * See progress note by Cee Thomason 1/11 regarding goals of care discussion.    * Had another discussion with patient and son at bedside regarding code status 1/12.  Discussed very low likelihood of successful resuscitation or wean from vent  with high risk of injury and painful final moments with CPR and strongly recommended DNR/DNI status.  He states he would like to think about it, but at this time prefers to remain FULL code.        Diet: Pureed Diet (level 4) Mildly Thick (level 2)  Snacks/Supplements Adult: Other; Chocolate Vital Cuisine daily at 2 pm; Between Meals    DVT Prophylaxis: Pneumatic Compression Devices  Bravo Catheter: Not present  Central Lines: None  Code Status: Full Code Full code per patient    Disposition Plan   Expected Discharge: 01/15/2022     Anticipated discharge location: home with help/services    Delays:     Placement - Homecare          The patient's care was discussed with the Bedside Nurse, Patient and Patient's Family.    See plan above and goals of treatment above regarding discussion.     Lashawn Chiang  Hospitalist Service  [facility]  Securely message with the [employer]oc[employer] Web Console (learn more here)  Text page via [employer] Paging/Directory      ______________________________________________________________________    Interval History   He reports feeling ok. He still feels weak and tired. He doesn't really like his new diet but it sounds like he ate pretty good for breakfast. He did decline PT this morning and reports that he doesn't really feel like working with them.     Data reviewed today: I reviewed all medications, new labs and imaging results over the last 24 hours. I personally reviewed no images or EKG's today.    Physical Exam   Vital Signs: Temp: 98  F (36.7  C) Temp src: Axillary BP: (!) 82/64 Pulse: 85   Resp: 16 SpO2: 96 % O2 Device: None (Room air)    Weight: 131 lbs 3.2 oz  General Appearance: Thin elderly male in NAD, frail appearing and appears older than stated age.   Respiratory: respirations non-labored on room air, coarse breaths sounds bilaterally   Cardiovascular: RRR, systolic murmur   GI: soft, non-distended, non-tender   Skin: no rashes  or lesions   Other: Alert and appropriate, cranial nerves grossly intact     Data   Recent Labs   Lab 01/14/22  0913 01/13/22  0623 01/12/22  0608 01/11/22  0917 01/10/22  1748   WBC  --  12.7* 14.0* 14.4* 21.7*   HGB  --  10.2* 10.8* 12.0* 13.5   MCV  --  100 98 98 103*   PLT  --  306 303 333 422   NA  --  140 139  --  133   POTASSIUM 3.5 3.3* 3.6  --  3.7   CHLORIDE  --  108 106  --  100   CO2  --  26 30  --  27   BUN  --  13 17  --  25   CR  --  0.81 0.93  --  1.13   ANIONGAP  --  6 3  --  6   ULISSES  --  8.0* 8.3*  --  9.1   GLC  --  87 79  --  130*     No results found for this or any previous visit (from the past 24 hour(s)).

## 2022-01-14 NOTE — PROGRESS NOTES
MD Notification    Notified Person: MD    Notified Person Name: Dr Rodriguez-Neurology    Notification Date/Time:@1650    Notification Interaction: telephone paged    Purpose of Notification: MRI brain results back.     Orders Received: Dr Shine is reviewing results/images      Comments:  Also updated hospitalist-Dr Chiang

## 2022-01-14 NOTE — CONSULTS
"            [facility]    Neurology Consultation    Efrem Ramos MRN# [id number]   YOB: 1945 Age: 76 year old    Code Status:Full Code   Date of Admission: 1/10/2022  Date of Consult:01/14/2022                                                                            Chief complaint:    Generalized weakness with frequent falls and difficulty swallowing.  The information was obtained from the patient and review of his medical records.     History of present complaint:  Mr. Ramos is a 76-year-old, right-handed, retired male patient who has a history of for severe valvular heart disease, severe aortic stenosis and severe tricuspid regurgitation and CAD.  He also has a history for atrial fibrillation, history for pulmonary fibrosis and MGUS indicated that for several weeks he has been experiencing subjective generalized weakness and has had unexplained falls, not associated with loss of consciousness or lightheadedness.  He also reported having more frequent tremors in upper extremities.  He was admitted 1/10/2022 where he was noted to have leukocytosis  It would appear that there has been concerns regarding poor diet, malnutrition\" patient reporting that his appetite has been poor and he has had difficulty swallowing.  His past history significant for the fact that in the 1990s he presented with right-sided weakness and was subsequently diagnosed with ADEM, the diagnosis required a brain biopsy for confirmation.  He stated that following that attack he was left with right-sided weakness and initially could only ambulate with a walker.  He also noted that he has had tremors in the right hand since that attack         Past Medical History:     Past Medical History:   Diagnosis Date     Atrial fibrillation (H)     amiodarone therapy discontinued due to pulmonary toxicity     Atrial flutter (H)      Benign essential hypertension 11/20/2018     Cancer (H) vocal cord     Carpal tunnel syndrome     " abstracted 7/3/02.     Coronary artery disease involving native coronary artery of native heart without angina pectoris 5/8/2020    Cath 5/28/2020: patent stents; Cath 5/18/2020: ISABEL x4 to RCA; Cath 3/2020: 1 vessel disease; Cath 2017: moderate 2 vessel disease     CVA (cerebral infarction) 5/5/2015     Demyelinating disease of central nervous system, unspecified (H)     abstracted 7/3/02.     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     Pneumonia 8/17/2016     PVD (peripheral vascular disease) (H)      Redundant colon     needs CT colonography     Shingles      SKIN DISORDERS NEC 3/15/2005     Sleep apnea      Sleep apnea      SVT (supraventricular tachycardia) (H)          Past Surgical History:     Past Surgical History:   Procedure Laterality Date     BIOPSY  brain 2002     BONE MARROW BIOPSY, BONE SPECIMEN, NEEDLE/TROCAR N/A 6/8/2017    Procedure: BIOPSY BONE MARROW;  UNILATERAL BONE MARROW BIOPSY (CONSCIOUS SEDATION) ;  Surgeon: Jamie Gonzales MD;  Location:  GI     CARDIAC CATHERIZATION  09/05/2017    2V CAD, IFR of RCA 0.95     CV CORONARY ANGIOGRAM N/A 3/23/2020    Procedure: Coronary Angiogram and right heart cath;  Surgeon: Gino Ferrer MD;  Location:  HEART CARDIAC CATH LAB     CV HEART CATHETERIZATION WITH POSSIBLE INTERVENTION N/A 5/28/2020    Procedure: Heart  Catheterization with Possible Intervention;  Surgeon: Larry Chawla MD;  Location:  HEART CARDIAC CATH LAB     CV HEART CATHETERIZATION WITH POSSIBLE INTERVENTION N/A 5/18/2020    Procedure: Heart Catheterization with Possible Intervention;  Surgeon: Gaurang Swenson MD;  Location:  HEART CARDIAC CATH LAB     CV INSTANTANEOUS WAVE-FREE RATIO N/A 3/23/2020    Procedure: Instantaneous Wave-Free Ratio;  Surgeon: Gino Ferrer MD;  Location:  HEART CARDIAC CATH LAB     CV PCI ATHERECTOMY ORBITAL N/A 5/18/2020    Procedure: Percutaneous Coronary Intervention Atherectomy Rotational;  Surgeon: Gaurang Swenson MD;  Location:  HEART CARDIAC CATH LAB     CV PCI STENT DRUG ELUTING N/A 5/18/2020    Procedure: Percutaneous Coronary Intervention Stent Drug Eluting;  Surgeon: Gaurang Swenson MD;  Location:  HEART CARDIAC CATH LAB     CV RIGHT HEART CATH MEASUREMENTS RECORDED N/A 5/28/2020    Procedure: Right Heart Cath;  Surgeon: Larry Chawla MD;  Location:  HEART CARDIAC CATH LAB     CV TEMPORARY PACEMAKER INSERTION N/A 5/18/2020    Procedure: Temporary Pacemaker Insertion;  Surgeon: Gaurang Swenson MD;  Location:  HEART CARDIAC CATH LAB     ESOPHAGOSCOPY, GASTROSCOPY, DUODENOSCOPY (EGD), COMBINED N/A 9/8/2018    Procedure: COMBINED ESOPHAGOSCOPY, GASTROSCOPY, DUODENOSCOPY (EGD), BIOPSY SINGLE OR MULTIPLE;;  Surgeon: Xander Marie MD;  Location:  GI     HEAD & NECK SURGERY       ORTHOPEDIC SURGERY      right arm ulna reset after injury     THORACOSCOPIC WEDGE RESECTION LUNG Right 8/2/2016    Procedure: THORACOSCOPIC WEDGE RESECTION LUNG;  Surgeon: Abdelrahman Noriega MD;  Location:  OR     [facility] NONSPECIFIC PROCEDURE  as a child    T & A. abstracted 7/3/02.     [facility] NONSPECIFIC PROCEDURE  early    CTR. abstracted 7/3/02.          Social History:     Social History     Socioeconomic History     Marital status:      Spouse name: Not on file      Number of children: Not on file     Years of education: Not on file     Highest education level: Not on file   Occupational History     Not on file   Tobacco Use     Smoking status: Former Smoker     Packs/day: 1.00     Years: 30.00     Pack years: 30.00     Types: Cigarettes     Quit date: 2013     Years since quittin.4     Smokeless tobacco: Never Used   Substance and Sexual Activity     Alcohol use: Not Currently     Alcohol/week: 42.0 standard drinks     Types: 42 Standard drinks or equivalent per week     Drug use: No     Sexual activity: Not Currently   Other Topics Concern     Parent/sibling w/ CABG, MI or angioplasty before 65F 55M? Not Asked      Service Not Asked     Blood Transfusions Not Asked     Caffeine Concern Yes     Comment: 1 Coke occasionally      Occupational Exposure Not Asked     Hobby Hazards Not Asked     Sleep Concern Not Asked     Stress Concern Not Asked     Weight Concern Not Asked     Special Diet No     Back Care Not Asked     Exercise No     Bike Helmet Not Asked     Seat Belt Not Asked     Self-Exams Not Asked   Social History Narrative    Retired (disability)      Social Determinants of Health     Financial Resource Strain: Not on file   Food Insecurity: Not on file   Transportation Needs: Not on file   Physical Activity: Not on file   Stress: Not on file   Social Connections: Not on file   Intimate Partner Violence: Not on file   Housing Stability: Not on file     Patient denies smoking, no significant alcohol intake, denies illicit drugs use       Family History:     Family History   Problem Relation Age of Onset     Blood Disease Mother         Anemia     Cardiovascular Father      Cancer - colorectal Maternal Grandfather      Hypertension Brother      Diabetes Sister 5        Juvinile Diabetes passed at 36     Respiratory Other         Lung Cancer     Reviewed and not felt to be contributory.        Home Medications:     Prior to Admission  Medications   Prescriptions Last Dose Informant Patient Reported? Taking?   acetaminophen (TYLENOL) 325 MG tablet Past Month at Unknown time  Yes Yes   Sig: Take 325-650 mg by mouth every 6 hours as needed for mild pain    apixaban ANTICOAGULANT (ELIQUIS) 5 MG tablet 1/10/2022 at AM  No Yes   Sig: Take 1 tablet (5 mg) by mouth 2 times daily   atorvastatin (LIPITOR) 40 MG tablet 1/10/2022 at Unknown time  No Yes   Sig: Take 1 tablet (40 mg) by mouth daily   budesonide (ENTOCORT EC) 3 MG EC capsule 1/10/2022 at 3 mg  No Yes   Si mg daily x 6 weeks, then 6 mg daily x 2 weeks, then 3 mg daily x 2 weeks   calcium carbonate 600 mg-vitamin D 400 units (CALTRATE) 600-400 MG-UNIT per tablet 1/10/2022 at AM  Yes Yes   Sig: Take 1 tablet by mouth 2 times daily    clopidogrel (PLAVIX) 75 MG tablet 1/10/2022 at Unknown time  No Yes   Sig: Take 1 tablet (75 mg) by mouth daily   digoxin (LANOXIN) 125 MCG tablet 1/10/2022 at Unknown time  No Yes   Sig: Take 1 tablet (125 mcg) by mouth daily   furosemide (LASIX) 20 MG tablet 1/10/2022 at AM  No Yes   Sig: Take 1 tablet (20 mg) by mouth daily AND 0.5 tablets (10 mg) daily (with lunch).   gabapentin (NEURONTIN) 300 MG capsule 2022 at Unknown time  No Yes   Sig: TAKE 2 CAPSULES BY MOUTH EVERY EVENING   melatonin 1 MG TABS tablet 2022 at Unknown time  Yes Yes   Sig: Take 1 mg by mouth nightly as needed for sleep   metoprolol succinate ER (TOPROL XL) 25 MG 24 hr tablet 1/10/2022 at Unknown time  No Yes   Sig: Take 1 tablet (25 mg) by mouth daily   mirtazapine (REMERON) 15 MG tablet 2022 at Unknown time  No Yes   Sig: Take 1 tablet (15 mg) by mouth At Bedtime   multivitamin (OCUVITE) TABS 1/10/2022 at Unknown time Self Yes Yes   Sig: Take 1 tablet by mouth daily    potassium chloride ER (K-TAB/KLOR-CON) 10 MEQ CR tablet 1/10/2022 at Unknown time  No Yes   Sig: Take 1 tablet (10 mEq) by mouth daily   senna-docusate (SENOKOT-S/PERICOLACE) 8.6-50 MG tablet Past Week at  Unknown time  No Yes   Sig: Take 2 tablets by mouth 2 times daily   Patient taking differently: Take 2 tablets by mouth 2 times daily as needed for constipation       Facility-Administered Medications: None          Allergy:     Allergies   Allergen Reactions     Sulfa Drugs Difficulty breathing, Swelling and Hives     Adhesive Tape Blisters     Amiodarone Other (See Comments)     Developed pleural effusion     Penicillins Other (See Comments)     Reaction occurred as a child  Other reaction(s): Hives          Inpatient Medications:   Scheduled Meds:    apixaban ANTICOAGULANT  5 mg Oral BID     atorvastatin  40 mg Oral Daily     budesonide  3 mg Oral Daily     cefTRIAXone  2 g Intravenous Q24H     clopidogrel  75 mg Oral Daily     digoxin  125 mcg Oral Daily     folic acid-vit B6-vit B12  1 tablet Oral Daily     gabapentin  300 mg Oral At Bedtime     [Held by provider] metoprolol succinate ER  25 mg Oral Daily     mirtazapine  15 mg Oral At Bedtime     sodium chloride (PF)  3 mL Intracatheter Q8H     PRN Meds: acetaminophen **OR** acetaminophen, [Held by provider] HYDROmorphone, lidocaine 4%, lidocaine (buffered or not buffered), melatonin, naloxone **OR** naloxone **OR** naloxone **OR** naloxone, ondansetron **OR** ondansetron, sodium chloride (PF)        Review of Systems    The Review of Systems is negative other than noted in the HPI  CONSTITUTIONAL: negative for fever, chills, change in weight  INTEGUMENTARY/SKIN: no rash or obvious new lesions  ENT/MOUTH: no sore throat, new sinus pain or nasal drainage, no neck mass noted  RESP: No pleuretic pain, No cough, no hemoptysis, No SOB   CV: negative for chest pain, palpitations or peripheral edema  GI: no nausea, vomiting, change in stools  : no dysuria or hematuria  MUSCULOSKELETAL: no myalgias, arthralgias or join efffusion  ENDOCRINE: no history of polyuria, polydyspsia or symptoms of thyroid dysfunction  PSYCHIATRIC: no change in mood stable  LYMPHATIC: no  "new lymphadenopathy  HEME: no bleeding or easy bruisability  NEURO: see HPI       Physical Exam:   Physical Exam   Vitals:  Height:5' 9\"  Weight:131 lbs 3.2 oz   Temp: 98  F (36.7  C) Temp src: Axillary BP: (!) 82/64 Pulse: 85   Resp: 16 SpO2: 96 % O2 Device: None (Room air)    General Appearance:  No acute distress and he was normocephalic, no signs of acute head or neck trauma neck was supple.  Neuro:       Mental Status Exam:   He was very alert with fluent speech, there was no dysarthria and repetition was good.  He followed simple and complex commands quite well and was oriented to time place and person and fund of knowledge was appropriate.       Cranial Nerves:   The pupils were equal but sluggishly reactive to light, there was no ptosis, extraocular movements are full without nystagmoid movements and visual fields are intact to confrontation.  Facial asymmetry was not appreciated and the tongue was midline and he had symmetrical palatal movements and the rest of the cranial nerve examination, 2-12 was intact.           Motor:   Muscle bulk and tone in all 4 extremities and there were no dystonic movements or tremors.  There were no fasciculations.  There was no pronator drifting of the outstretched hands.  Discussed with symmetrical at 4+/5.  In the lower extremities, had only mild diffuse weakness 4+/5 in Iliopsoas, abductors and abductors of the thighs and dorsiflexors and plantar flexors of both feet.           Reflexes: Deep tendon reflexes were 2+/4 at the biceps and triceps and brachioradialis, 2++/4 on the right, there was no cross adductor response, on the left, 2+/4, the ankle responses were not elicited bilaterally.  Plantar responses were equivocal due to withdrawal.       Sensory: There was a slight fading impairment to pinprick over the dorsum of the feet and vibratory sensation was impaired in the right great toe and he appeared to have decreased since the appreciation of pinprick and touch " over the right arm and right leg.  There was no cortical sensory level.                   Coordination:   There was no finger-to-nose dysmetria.       Gait: His gait was not tested as the patient had orthostatic symptoms, lightheadedness.  Neck: no nuchal rigidity, normal thyroid. No carotid bruits.    Cardiovascular: Regular rate and rhythm, no m/r/g  Lungs: Clear to auscultation  Abdomen: Soft, not tender, not distended  Extremities: No clubbing, no cyanosis, no edema       Data:   ROUTINE IP LABS   CBC RESULTS:     Recent Labs   Lab 01/13/22  0623 01/12/22  0608 01/11/22  0917   WBC 12.7* 14.0* 14.4*   RBC 3.18* 3.35* 3.78*   HGB 10.2* 10.8* 12.0*   HCT 31.7* 32.7* 37.0*    303 333     Basic Metabolic Panel:   Recent Labs   Lab Test 01/14/22  0913 01/13/22  0623 01/12/22  0608 01/10/22  1748   NA  --  140 139 133   POTASSIUM 3.5 3.3* 3.6 3.7   CHLORIDE  --  108 106 100   CO2  --  26 30 27   BUN  --  13 17 25   CR  --  0.81 0.93 1.13   GLC  --  87 79 130*   ULISSES  --  8.0* 8.3* 9.1     Liver panel:  Recent Labs   Lab Test 10/26/21  1627 02/16/21  1255 05/29/20  1310 05/28/20  1412 05/21/20  0530 01/09/20  1420   PROTTOTAL 7.7 7.8 6.1* 6.3*  --  8.2   ALBUMIN 2.8* 3.2* 2.1* 2.1* 2.2* 3.5   BILITOTAL 0.7 0.6 1.4* 1.3  --  1.2   ALKPHOS 118 115 71 88  --  111   AST 21 37 28 27  --  23   ALT 25 52 15 15  --  18     Lipid Profile:  Recent Labs   Lab Test 10/26/21  1627 06/24/19  1023 05/30/18  1204 08/24/17  1206 11/09/16  1246 09/04/15  1040 05/29/15  1203   CHOL 92 118 146 134 183 162 189   HDL 45 51 61 56 77 59 80   LDL 37 51 67 64 90 83 93   TRIG 52 80 90 70 78 100 80   CHOLHDLRATIO  --   --   --   --   --  2.7 2.4     Thyroid Panel:  Recent Labs   Lab Test 01/10/22  2134 09/06/18  0602 05/30/18  1204 06/14/16  1252 04/06/16  1252 01/14/16  1612 12/18/15  1406   TSH 2.58 2.05 3.66 6.06* 6.32* 7.89* 6.02*   T4  --   --   --   --  0.90 0.80 0.75*      Vitamin B12:   Recent Labs   Lab Test 01/12/22  1832  11/15/21  1607 02/16/21  1255   B12 1,043* 744 767           IMAGING:   All imaging studies were reviewed personally:   CT head:1/11/22   --No acute changes, no eccentric attenuation changes in the supratentorial white matter, possibly reflecting chronic microvascular ischemic changes, no evidence for hydrocephalus but there was a small, punctate focus of mineralization in the left parietal lobe, without mass-effect.  Brain MRI 1/11/2022:  Multiple chronic infarcts, in the left corona radiata and left occipital lobe.  Scattered punctate chronic infarcts in the bilateral cerebellar lobes.  Nonspecific patchy small areas of T2 flair hyperintense signal in the cerebral white matter, most prominent in the left greater than right frontal parietal area, no areas of enhancement or restricted diffusion.  The chronic appearing white matter changes could reflect chronic chronic small vessel ischemic changes versus demyelinating disease.        Assessment and Plan:                                         #.   -- 68-year-old patient admitted with dysphagia and fatigue with generalized weakness and leukocytosis.  #.   -- His examination revealed no significant motor weakness.  --The etiology of his fatigue is unclear but might be related to poor nutrition and deconditioning.  Patient admitted that there was no fluctuation in terms of his weak spells.  -Malnutrition, probably contributing to the above.  -Patient also admitted to symptoms related to grief reaction, concerning death of his wife in September.  #.   -- Past history for ischemic stroke with residual hypnesthesia, right upper and right lower extremities.  --Past history for ADEM, patient reporting having been left with mild right-sided weakness.  This is not usually a relapsing remitting disorder, however, in view of his history for demyelinating disease, would also recommend MRI of the cervical and thoracic spine with contrast.  #.  Leukocytosis, sputum culture is  noted to be positive for m and pulmonary consultation was recommended.  Patient is currently on ceftriaxone.  Recommendations:  History for atrial fibrillation, patient is on Eliquis and digoxin.  There is also history for severe valvular disease, aortic stenosis and severe tricuspid regurgitation.  1. Although his examination did not demonstrate focal motor weakness, in view of his history, a brain MRI was recommended with and without contrast.  2. Motor symptoms are not consistent with a neuromuscular junctional disorder, however, would also recommend myasthenic/Eaton-Lambert panel.      #. PT/OT/Speech  -- Evaluations  #. Nutrition / GI Prophylaxis  --Per recommendations of speech therapy, swallowing eval's pending  We will continue to follow along with you and make recommendations in the future, if indicated  Time: 60 minutes evaluation and management.  More than 50% of the time in counseling and directing care.  The differential diagnosis and management was discussed with the patient at length, he verbalized understanding and agreed to proceed as recommended.    Thank you for allowing me to participate in care of this patient.  Do not hesitate to contact me if I can be of further assistance to you.    Enedelia Rodriguez M.D.  [facility]  Office [phone]

## 2022-01-14 NOTE — PROVIDER NOTIFICATION
Dr Chiang notified in person for    BP 82s/60 right now.   Pt asymptomatic at this time.       Orders/call back:  MD will assess pt and will order IVF.

## 2022-01-14 NOTE — PLAN OF CARE
Patient alert and oriented x4, flat affect. VSS on RA ex softer BPs. Denies pain. Up SBA, W/GB, deconditioned, didn't tolerate PT session well today, too fatigued. Pulmonary consult today, continuing with IV abx given positive sputum cultures. Productive cough. Esophagram 1/11 showed esophageal dysmotility, SLP rec pureed diet w/ thickened liquids. Decreased appetite. Voiding fine, no BM. PIV x1 - SL. WBC trending down. Planning on neuro consult tmr - hx ADEM. Discharge pending. Will possibly need TCU placement.

## 2022-01-15 NOTE — PROGRESS NOTES
"PULMONOLOGY PROGRESS NOTE    Date of Admission: 1/10/2022    CC/Reason for Hospital visit: Pulmonary infiltrates, pulmonary fibrosis cough and dyspnea.  SUBJECTIVE      Stable night.  Complained of not sleeping well, and I woke him this morning.  Feels his breathing is better.    ROS: A Problem Pertinent review of systems was negative except for items noted in HPI.  Past Medical, Family, and Social/Substance History has been reviewed: No interval changes.   OBJECTIVE   Vital signs:  Temp: 98  F (36.7  C) Temp src: Oral BP: 102/63 Pulse: 69   Resp: 16 SpO2: 97 % O2 Device: None (Room air)   Height: 175.3 cm (5' 9\") Weight: 60.7 kg (133 lb 12.8 oz)  Estimated body mass index is 19.76 kg/m  as calculated from the following:    Height as of this encounter: 1.753 m (5' 9\").    Weight as of this encounter: 60.7 kg (133 lb 12.8 oz).        I/O last 3 completed shifts:  In: 1190 [P.O.:690; I.V.:500]  Out: -       CONSTITUTIONAL/GENERAL APPEARANCE: Alert  male, awakens easily, No Apparent Distress.  PSYCHIATRIC: Pleasant and appropriate mood and affect. Oriented x 3.  EARS, NOSE,THROAT,MOUTH: External ears and nose overall normal. Normal oral mucosa.   NECK: Neck appearance normal. No neck masses and the thyroid is not enlarged.   RESPIRATORY: Non-labored effort.  Few crackles heard at the lung bases bilaterally.  No wheezing.  CARDIOVASCULAR: Irregularly irregular with 3 out of 6 systolic murmur consistent with aortic stenosis.  . Extremities with no edema.      LABORATORY ASSESSMENT    Arterial Blood GasNo lab results found in last 7 days.  CBC  Recent Labs   Lab 01/15/22  0635 01/13/22  0623 01/12/22  0608 01/11/22  0917   WBC 11.0 12.7* 14.0* 14.4*   RBC 3.19* 3.18* 3.35* 3.78*   HGB 10.2* 10.2* 10.8* 12.0*   HCT 31.8* 31.7* 32.7* 37.0*    100 98 98   MCH 32.0 32.1 32.2 31.7   MCHC 32.1 32.2 33.0 32.4   RDW 14.5 14.2 14.0 14.0    306 303 333     BMP  Recent Labs   Lab 01/15/22  0635 01/14/22  0913 " 01/13/22  0623 01/12/22  0608 01/10/22  1748     --  140 139 133   POTASSIUM 3.5 3.5 3.3* 3.6 3.7   CHLORIDE 109  --  108 106 100   ULISSES 8.1*  --  8.0* 8.3* 9.1   CO2 29  --  26 30 27   BUN 14  --  13 17 25   CR 0.79  --  0.81 0.93 1.13   GLC 83  --  87 79 130*     INRNo lab results found in last 7 days.   BNPNo lab results found in last 7 days.  VENOUS BLOOD GASESNo lab results found in last 7 days.      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    IMAGING    CT scan 1/11/2022  IMPRESSION:   1.  Progressed emphysema.   2.  Progressed fibrotic changes with bronchiectasis and increased interstitial consolidation consistent with underlying usual interstitial pneumonitis, correlate to exclude superimposed infection.  3.  Nonspecific, increasing mediastinal lymphadenopathy, correlate to exclude underlying malignancy or lymphoproliferative cause. No discrete pulmonary mass.  4.  Mildly enlarged heart with coronary artery disease and atherosclerotic vascular disease.    PFT & OTHER TESTING       ASSESSMENT / PLAN      Pulmonary Diagnoses:  Abnl CT/CXR R91.8  Cough R05  Emphysema J43.9  Interstitial pneum J84.111  Pulmonary fibrosis NOS J84.10  SOB R06.02    Additional COVID-19 diagnoses:  Concern of possible exposure to COVID-19, Now RULED OUT Z03.818    ASSESSMENT : 76-year-old gentleman, history of mild pulmonary fibrotic changes in the past as well as emphysema with evidence of bronchiectasis, presents with complaints of increasing cough and shortness of breath following respiratory illness of approximately 2 weeks duration.  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  PLAN:     Recommended continued treatment of Moraxella catarrhalis.  Okay to switch to cefdinir 300 mg twice daily for total of 10 days of antibiotic  therapy.    Follow-up at [facility] 4 to 6 weeks after discharge for pulmonary function studies and follow-up care.    Will require CT scan follow-up.  Oncology has requested CT scan with IV contrast to be performed in 3 months to follow-up for lymphadenopathy in the chest.  This would also be an appropriate time to repeat the CT scan to follow-up interstitial infiltrate/pneumonitis.    Will sign off.  Please call with any questions.        Discussed  with Dr. Chiang.    Urbano Martinez M.D.  [facility] [place]  [phone]  Pager [phone]

## 2022-01-15 NOTE — PROGRESS NOTES
[facility]    Neurology Progress note    Efrem Ramos MRN# [id number]   YOB: 1945 Age: 76 year old    Code Status:Full Code   Date of Admission: 1/10/2022                                                                                       Assessment and Plan:                                         #.  Generalized weakness with frequent falls  -- MRI brain 1/14/2022 demonstrating chronic bihemispheric and cerebellar infarcts, no acute lesions.  No enhancing lesions to indicate acute demyelinating inflammatory disease.  --Patient's son, and the patient, admitted to concerns regarding recent signs of depression.    -- Generalized weakness might in part be related to poor nutrition, complaint of dysphagia.    -- MRI of the cervical and thoracic spine revealed no abnormal cord signal signal, incidental finding of acute compression fracture at T11 and the patient did endorse having low back pain.  #.  Memory and cognitive impairment of unclear etiology  Cannot rule out pseudodementia due to depression, if warranted, patient would benefit from outpatient neuropsychological testing.  Recommendations:  1. Patient is already on Eliquis, history for atrial fib, having chronic areas of infarction in both cerebral hemispheres and in the cerebellum.  2. if he continues to have pain localized to the lower lumbar spine, it might be necessary to refer him for vertebroplasty as an outpatient for his T11 compression fracture.  3. Could consider intervention for ongoing depression, he may benefit from a SSRI, will defer to Dr. Chiang.  4. history for vitamin B12 deficiency:, Continue vitamin B12 replacement, vitamin B12 level was 1043  .  #.  Continue PT/OT therapy           Interval   History    I spoke with Mr. Ramos's son, Efrem, on 1/14/2022. He indicated that his father has exhibited signs of depression more recently but concerned about cognitive impairment, which has been longstanding  since his diagnosis with ADEM. He acknowledges his father has not maintained adequate nutrition over the past several months and he was concerned that he also was not sleeping well. He wondered whether the symptoms could be related to his ADEM and whether there was a treatment available. The differential diagnosis and management was discussed with him at length. He was instructed that there was no treatment for this monophasic illness, ADEM and no evidence on MRI of the relapsing, remitting demyelinating disorder. For his cognitive impairment, neuropsychological testing is recommended in the future, and metabolic screen to rule to rule out toxic, endocrine or metabolic disorder. In view of concerns regarding worsening signs of depression, he wondered whether psychiatric evaluation is indicated or for medication intervention at this time.    Mr. 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  He was admitted through the emergency room on 1/10/2022 when he presented with increasing cough, poor intake and fatigue and difficulty swallowing.  He was noted to have leukocytosis, with macrocytosis.  He does have a history for MGUS and is being seen by oncology, as regards with dysphagia, gastroenterology has been consulted       Past Medical History:     Past Medical History:   Diagnosis Date     Atrial fibrillation (H)     amiodarone therapy discontinued due to pulmonary toxicity     Atrial flutter (H)      Benign essential hypertension 11/20/2018     Cancer (H) vocal cord     Carpal tunnel syndrome     abstracted 7/3/02.     Coronary artery disease involving native coronary artery of native heart without angina pectoris 5/8/2020    Cath 5/28/2020: patent stents; Cath 5/18/2020: ISABEL x4 to RCA; Cath 3/2020: 1 vessel disease;  Cath 2017: moderate 2 vessel disease     CVA (cerebral infarction) 5/5/2015     Demyelinating disease of central nervous system, unspecified (H)     abstracted 7/3/02.     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     Pneumonia 8/17/2016     PVD (peripheral vascular disease) (H)      Redundant colon     needs CT colonography     Shingles      SKIN DISORDERS NEC 3/15/2005     Sleep apnea      Sleep apnea      SVT (supraventricular tachycardia) (H)          Past Surgical History:     Past Surgical History:   Procedure Laterality Date     BIOPSY  brain 2002     BONE MARROW BIOPSY, BONE SPECIMEN, NEEDLE/TROCAR N/A 6/8/2017    Procedure: BIOPSY BONE MARROW;  UNILATERAL BONE MARROW BIOPSY (CONSCIOUS SEDATION) ;  Surgeon: Jamie Gonzales MD;  Location:  GI     CARDIAC CATHERIZATION  09/05/2017    2V CAD, IFR of RCA 0.95     CV CORONARY ANGIOGRAM N/A 3/23/2020    Procedure: Coronary Angiogram and right heart cath;  Surgeon: Gino Ferrer MD;  Location:  HEART CARDIAC CATH LAB     CV HEART CATHETERIZATION WITH POSSIBLE INTERVENTION N/A 5/28/2020    Procedure: Heart Catheterization with Possible Intervention;  Surgeon: Larry Chawla MD;  Location:  HEART CARDIAC CATH LAB     CV HEART CATHETERIZATION WITH POSSIBLE INTERVENTION N/A 5/18/2020    Procedure: Heart Catheterization with Possible  Intervention;  Surgeon: Gaurang Swenson MD;  Location:  HEART CARDIAC CATH LAB     CV INSTANTANEOUS WAVE-FREE RATIO N/A 3/23/2020    Procedure: Instantaneous Wave-Free Ratio;  Surgeon: Gino Ferrer MD;  Location:  HEART CARDIAC CATH LAB     CV PCI ATHERECTOMY ORBITAL N/A 5/18/2020    Procedure: Percutaneous Coronary Intervention Atherectomy Rotational;  Surgeon: Gaurang Swenson MD;  Location:  HEART CARDIAC CATH LAB     CV PCI STENT DRUG ELUTING N/A 5/18/2020    Procedure: Percutaneous Coronary Intervention Stent Drug Eluting;  Surgeon: Gaurang Swenson MD;  Location:  HEART CARDIAC CATH LAB     CV RIGHT HEART CATH MEASUREMENTS RECORDED N/A 5/28/2020    Procedure: Right Heart Cath;  Surgeon: Larry Chawla MD;  Location:  HEART CARDIAC CATH LAB     CV TEMPORARY PACEMAKER INSERTION N/A 5/18/2020    Procedure: Temporary Pacemaker Insertion;  Surgeon: Gaurang Swenson MD;  Location:  HEART CARDIAC CATH LAB     ESOPHAGOSCOPY, GASTROSCOPY, DUODENOSCOPY (EGD), COMBINED N/A 9/8/2018    Procedure: COMBINED ESOPHAGOSCOPY, GASTROSCOPY, DUODENOSCOPY (EGD), BIOPSY SINGLE OR MULTIPLE;;  Surgeon: Xander Marie MD;  Location:  GI     HEAD & NECK SURGERY       ORTHOPEDIC SURGERY      right arm ulna reset after injury     THORACOSCOPIC WEDGE RESECTION LUNG Right 8/2/2016    Procedure: THORACOSCOPIC WEDGE RESECTION LUNG;  Surgeon: Abdelrahman Noriega MD;  Location:  OR     [facility] NONSPECIFIC PROCEDURE  as a child    T & A. abstracted 7/3/02.     [facility] NONSPECIFIC PROCEDURE  early    CTR. abstracted 7/3/02.          Social History:     Social History     Socioeconomic History     Marital status:      Spouse name: Not on file     Number of children: Not on file     Years of education: Not on file     Highest education level: Not on file   Occupational History     Not on file   Tobacco Use     Smoking status: Former Smoker     Packs/day: 1.00     Years: 30.00      Pack years: 30.00     Types: Cigarettes     Quit date: 2013     Years since quittin.4     Smokeless tobacco: Never Used   Substance and Sexual Activity     Alcohol use: Not Currently     Alcohol/week: 42.0 standard drinks     Types: 42 Standard drinks or equivalent per week     Drug use: No     Sexual activity: Not Currently   Other Topics Concern     Parent/sibling w/ CABG, MI or angioplasty before 65F 55M? Not Asked      Service Not Asked     Blood Transfusions Not Asked     Caffeine Concern Yes     Comment: 1 Coke occasionally      Occupational Exposure Not Asked     Hobby Hazards Not Asked     Sleep Concern Not Asked     Stress Concern Not Asked     Weight Concern Not Asked     Special Diet No     Back Care Not Asked     Exercise No     Bike Helmet Not Asked     Seat Belt Not Asked     Self-Exams Not Asked   Social History Narrative    Retired (disability)      Social Determinants of Health     Financial Resource Strain: Not on file   Food Insecurity: Not on file   Transportation Needs: Not on file   Physical Activity: Not on file   Stress: Not on file   Social Connections: Not on file   Intimate Partner Violence: Not on file   Housing Stability: Not on file     Patient denies smoking, no significant alcohol intake, denies illicit drugs use       Family History:     Family History   Problem Relation Age of Onset     Blood Disease Mother         Anemia     Cardiovascular Father      Cancer - colorectal Maternal Grandfather      Hypertension Brother      Diabetes Sister 5        Juvinile Diabetes passed at 36     Respiratory Other         Lung Cancer     Reviewed and not felt to be contributory.        Home Medications:     Prior to Admission Medications   Prescriptions Last Dose Informant Patient Reported? Taking?   acetaminophen (TYLENOL) 325 MG tablet Past Month at Unknown time  Yes Yes   Sig: Take 325-650 mg by mouth every 6 hours as needed for mild pain    apixaban  ANTICOAGULANT (ELIQUIS) 5 MG tablet 1/10/2022 at AM  No Yes   Sig: Take 1 tablet (5 mg) by mouth 2 times daily   atorvastatin (LIPITOR) 40 MG tablet 1/10/2022 at Unknown time  No Yes   Sig: Take 1 tablet (40 mg) by mouth daily   budesonide (ENTOCORT EC) 3 MG EC capsule 1/10/2022 at 3 mg  No Yes   Si mg daily x 6 weeks, then 6 mg daily x 2 weeks, then 3 mg daily x 2 weeks   calcium carbonate 600 mg-vitamin D 400 units (CALTRATE) 600-400 MG-UNIT per tablet 1/10/2022 at AM  Yes Yes   Sig: Take 1 tablet by mouth 2 times daily    clopidogrel (PLAVIX) 75 MG tablet 1/10/2022 at Unknown time  No Yes   Sig: Take 1 tablet (75 mg) by mouth daily   digoxin (LANOXIN) 125 MCG tablet 1/10/2022 at Unknown time  No Yes   Sig: Take 1 tablet (125 mcg) by mouth daily   furosemide (LASIX) 20 MG tablet 1/10/2022 at AM  No Yes   Sig: Take 1 tablet (20 mg) by mouth daily AND 0.5 tablets (10 mg) daily (with lunch).   gabapentin (NEURONTIN) 300 MG capsule 2022 at Unknown time  No Yes   Sig: TAKE 2 CAPSULES BY MOUTH EVERY EVENING   melatonin 1 MG TABS tablet 2022 at Unknown time  Yes Yes   Sig: Take 1 mg by mouth nightly as needed for sleep   metoprolol succinate ER (TOPROL XL) 25 MG 24 hr tablet 1/10/2022 at Unknown time  No Yes   Sig: Take 1 tablet (25 mg) by mouth daily   mirtazapine (REMERON) 15 MG tablet 2022 at Unknown time  No Yes   Sig: Take 1 tablet (15 mg) by mouth At Bedtime   multivitamin (OCUVITE) TABS 1/10/2022 at Unknown time Self Yes Yes   Sig: Take 1 tablet by mouth daily    potassium chloride ER (K-TAB/KLOR-CON) 10 MEQ CR tablet 1/10/2022 at Unknown time  No Yes   Sig: Take 1 tablet (10 mEq) by mouth daily   senna-docusate (SENOKOT-S/PERICOLACE) 8.6-50 MG tablet Past Week at Unknown time  No Yes   Sig: Take 2 tablets by mouth 2 times daily   Patient taking differently: Take 2 tablets by mouth 2 times daily as needed for constipation       Facility-Administered Medications: None          Allergy:     Allergies  "  Allergen Reactions     Sulfa Drugs Difficulty breathing, Swelling and Hives     Adhesive Tape Blisters     Amiodarone Other (See Comments)     Developed pleural effusion     Penicillins Other (See Comments)     Reaction occurred as a child  Other reaction(s): Hives          Inpatient Medications:   Scheduled Meds:    apixaban ANTICOAGULANT  5 mg Oral BID     atorvastatin  40 mg Oral Daily     budesonide  3 mg Oral Daily     cefTRIAXone  2 g Intravenous Q24H     clopidogrel  75 mg Oral Daily     digoxin  125 mcg Oral Daily     folic acid-vit B6-vit B12  1 tablet Oral Daily     gabapentin  300 mg Oral At Bedtime     gadobutrol  6 mL Intravenous Once     [Held by provider] metoprolol succinate ER  25 mg Oral Daily     mirtazapine  15 mg Oral At Bedtime     sodium chloride (PF)  3 mL Intracatheter Q8H     PRN Meds: acetaminophen **OR** acetaminophen, [Held by provider] HYDROmorphone, lidocaine 4%, lidocaine (buffered or not buffered), melatonin, naloxone **OR** naloxone **OR** naloxone **OR** naloxone, ondansetron **OR** ondansetron, sodium chloride (PF)        Review of Systems    A comprehensive review of systems was performed and found to be negative except as described in this note  CONSTITUTIONAL: negative for fever, chills, change in weight  INTEGUMENTARY/SKIN: no rash or obvious new lesions  ENT/MOUTH: no sore throat, new sinus pain or nasal drainage, no neck mass noted  RESP: No pleuretic pain, No cough, no hemoptysis, No SOB   CV: negative for chest pain, palpitations or peripheral edema  GI: no nausea, vomiting, change in stools  : no dysuria or hematuria  MUSCULOSKELETAL: no myalgias, arthralgias or join efffusion  ENDOCRINE: no history of polyuria, polydyspsia or symptoms of thyroid dysfunction  PSYCHIATRIC: no change in mood stable  LYMPHATIC: no new lymphadenopathy  HEME: no bleeding or easy bruisability  NEURO: see HPI       Physical Exam:   Physical Exam   Vitals:  Height:5' 9\"  Weight:133 lbs 12.8 " oz   Temp: 98  F (36.7  C) Temp src: Oral BP: 102/63 Pulse: 69   Resp: 16 SpO2: 97 % O2 Device: None (Room air)    General Appearance:  No acute distress  He was very alert today, speech was only slightly hypophonic, without dysarthria  Neck: no nuchal rigidity, normal thyroid. No carotid bruits.    Cardiovascular: Regular rate and rhythm, no m/r/g he was oriented to time place and person and follows simple and complex commands quite well.  Cranial nerves II to XII intact except mild drooping of the left corner of the mouth.  There was tenderness over the mid to lower spine but straight leg raising was negative.  He is grasps were symmetrical at grade 4+/5.  He was symmetrically weak in both lower extremities, proximally at grade 4+/5.  Lungs: Clear to auscultation  Abdomen: Soft, not tender, not distended  Extremities: No clubbing, no cyanosis, no edema       Data:   ROUTINE IP LABS   CBC RESULTS:     Recent Labs   Lab 01/15/22  0635 01/13/22  0623 01/12/22  0608   WBC 11.0 12.7* 14.0*   RBC 3.19* 3.18* 3.35*   HGB 10.2* 10.2* 10.8*   HCT 31.8* 31.7* 32.7*    306 303     Basic Metabolic Panel:   Recent Labs   Lab Test 01/15/22  0635 01/14/22  0913 01/13/22  0623 01/12/22  0608     --  140 139   POTASSIUM 3.5 3.5 3.3* 3.6   CHLORIDE 109  --  108 106   CO2 29  --  26 30   BUN 14  --  13 17   CR 0.79  --  0.81 0.93   GLC 83  --  87 79   ULISSES 8.1*  --  8.0* 8.3*     Liver panel:  Recent Labs   Lab Test 10/26/21  1627 02/16/21  1255 05/29/20  1310 05/28/20  1412 05/21/20  0530 01/09/20  1420   PROTTOTAL 7.7 7.8 6.1* 6.3*  --  8.2   ALBUMIN 2.8* 3.2* 2.1* 2.1* 2.2* 3.5   BILITOTAL 0.7 0.6 1.4* 1.3  --  1.2   ALKPHOS 118 115 71 88  --  111   AST 21 37 28 27  --  23   ALT 25 52 15 15  --  18     Lipid Profile:  Recent Labs   Lab Test 10/26/21  1627 06/24/19  1023 05/30/18  1204 08/24/17  1206 11/09/16  1246 09/04/15  1040 05/29/15  1203   CHOL 92 118 146 134 183 162 189   HDL 45 51 61 56 77 59 80   LDL 37 51 67  64 90 83 93   TRIG 52 80 90 70 78 100 80   CHOLHDLRATIO  --   --   --   --   --  2.7 2.4     Thyroid Panel:  Recent Labs   Lab Test 01/10/22  2134 09/06/18  0602 05/30/18  1204 06/14/16  1252 04/06/16  1252 01/14/16  1612 12/18/15  1406   TSH 2.58 2.05 3.66 6.06* 6.32* 7.89* 6.02*   T4  --   --   --   --  0.90 0.80 0.75*      Vitamin B12:   Recent Labs   Lab Test 01/14/22  1905 01/12/22  1838 11/15/21  1607   B12 1,028* 1,043* 744           IMAGING:   All imaging studies were reviewed personally    MRI brain: 1/14/2022: Multiple chronic infarcts, left corona radiata and bilateral cerebellar hemisphere and left occipital lobe. There was no evidence for acute hemorrhage or mass, no contrast-enhancement. In addition, ventricles and sulci were normal and there were patchy, discrete, T2 flair hyperintense signal in the cerebral white matter most prominent in the left frontoparietal area, could represent chronic microvascular ischemic changes versus demyelination.    Thoracic spine with and without contrast 1//14/2022 acute to subacute moderate compression fracture of T1 with minimal central canal compromise and chronic compression fracture of T12 and L1. No significant central canal stenosis or significant neural foraminal narrowing, no abnormal signal within the thoracic cord.  MRI cervical spine with and without contrast 1/14/2022  No significant abnormal signal within the cervical cord. Multilevel degenerative changes without significant central canal stenosis.          Assessment and Plan:                                         #.  Generalized weakness with frequent falls  -- MRI brain 1/14/2022 demonstrating chronic bihemispheric and cerebellar infarcts, no acute lesions.  No enhancing lesions to indicate acute demyelinating inflammatory disease.  --Patient's son, and the patient, admitted to concerns regarding recent signs of depression.    -- Generalized weakness might in part be related to poor nutrition,  complaint of dysphagia.    -- MRI of the cervical and thoracic spine revealed no abnormal cord signal signal, incidental finding of acute compression fracture at T11 and the patient did endorse having low back pain.  #.  Memory and cognitive impairment of unclear etiology  Cannot rule out pseudodementia due to depression, if warranted, patient would benefit from outpatient neuropsychological testing.  Recommendations:  1. Patient is already on Eliquis, history for atrial fib, having chronic areas of infarction in both cerebral hemispheres and in the cerebellum.  2. if he continues to have pain localized to the lower lumbar spine, it might be necessary to refer him for vertebroplasty as an outpatient for his T11 compression fracture.  3. Could consider intervention for ongoing depression, he may benefit from a SSRI, will defer to Dr. Chiang.  4. history for vitamin B12 deficiency:, Continue vitamin B12 replacement, vitamin B12 level was 1043  .  #.  Continue PT/OT therapy    30 minutes was spent with direct care, more than 50% of the time in counseling and directing care.  The differential diagnosis, prognosis and management was discussed with the patient at length, he had questions which were addressed to his satisfaction.    Enedelia Rodriguez M.D.  [facility]  Office [phone]

## 2022-01-15 NOTE — PROGRESS NOTES
VSS on RA, ex hypotension at times. Received bolus on day shift for hypotension (80's/60's). BP usually is 100-90/70-60 range per chart, hold parameters on some medications. Endorsing SOB upon exertion at times. Ambulating w SBA, walker - pt does not like GB. 5/5 in upper extremities, 4/5 in lower extremities. Had MRI of cervical, thoracic spine this shift - showing T11-12, L1 compression fx, some chronic, some possibly acute on chronic. Tolerating pureed diet w thickened liquids. NPO since 2200 for fasting labs in AM. Takes pills whole, one at a time due to dysphagia. IVF started at 0000 for receiving contrast, being NPO for fasting labs. Order placed for 12h.

## 2022-01-15 NOTE — PROGRESS NOTES
[facility]    Medicine Progress Note - Hospitalist Service       Date of Admission:  1/10/2022    Assessment & Plan             Efrem Ramos is a 76 year old male admitted on 1/10/2022.   Complex past history including severe aortic stenosis, A-fib/flutter, HTN, CAD s/p PCI, pulmonary fibrosis, dysphagia among other chronic medical issues who with leukocytosis, increasing coughing, poor intake, fatigue.  Son reports patient had respiratory illness about 2 weeks ago with ongoing productive cough; son reporting increased HIGH as well as worsening oral intake.  This led to evaluation at Urgent Care with finding of leukocytosis of 20 so referred to ED.    Primarily suspect presentation is combination of possible CAP (though unclear) with deconditioning related to severe malnutrition in setting of dysphagia and esophageal dysmotility and multiple other underlying comorbidities.  May well be component of grieving vs depression given the death of his wife in September 2021.     Community Acquired Pneumonia with Moraxella Catarrhalis   Leukocytosis  Admission leukocytosis of 21.7 but procal <0.05 and afebrile without hypoxia; though reporting a productive cough with son reporting increase HIGH.  COVID/flu testing negative.   * Admission CT chest showing progressed emphysema, progressed fibrotic changes with bronchiectasis and consolidation consistent with UIP, cannot rule out infection; also noting increased LAD.  * Initially monitored off abx and WBC improved to 14 without intervention  * peripheral smear negative for dysplastic or atypical cells  - sputum culture positive for Moraxella catarrhalis - without hypoxia or fever, Pulm consulted and thinks this likely represents true infection and would explain his cough and interstitial infiltrates on imaging.   - Continue treating with ceftriaxone while hospitalized - can transition to cefdinir 300mg daily for a total of 10 days of antibiotic  therapy.   - Follow CBC  - Incentive spirometry and acapella     ADEM (Acute disseminated encephalomyelitis)   Diagnosed back in the 90s. He was apparently hospitalized for 2 months prior to reaching the diagnosis. He has had residual right sided weakness and dysphagia as a result. No records of this in Care Everywhere and does not regularly follow with a neurologist. His son is wondering if this is contributing to his present issues (I.e is this recurring or getting worse). Discussed that I am not familiar with this diagnosis.   - Neurology consult placed, appreciated assistance.   - Likely not contributing to current presentation     Mod-severe oropharyngeal dysphagia  Esophageal dysmotility  Hx esophageal stricture s/p dilation 2018  Hx vocal fold malignancy  Reports lack of appetite due to significant work of eating, likely related to poor dentition, dysphagia and esophageal dysmotility; noting increased difficulty with certain textures and restricting intake due to fear of choking.  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    - Pulmonary consult given worsening findings on CT     Severe valvular heart disease   severe aortic stenosis with aortic valve area of 0.8 cm  severe tricuspid regurgitation w/ resultant   chronic valvular heart failure  Most recent echocardiogram December 6, 2021 with preserved LVEF, moderately dilated right ventricle.  Patient is followed by cardiology, recently discussed at TAVR conference,  with son reporting he is not felt to be a candidate due to high risk.  - continue BB, statin     Severe coronary artery disease  Rotational arthrectomy and drug-eluting stent x4 to proximal to distal RCA in May 2020.  Complicated by left femoral artery bleed with retroperitoneal and scrotal hematoma.  -Continue PTA Plavix, atorvastatin     Persistent atrial fibrillation; [id number] of 6  - continue PTA Eliquis, digoxin, metoprolol  - Cardiac telemetry     Moderate to severe protein calorie nutrition  Most likely related to dysphagia and esophageal dysmotility.  May consider also cardiac wasting in the setting of known severe valvular heart disease.  * Has had >5% weight loss in 1 month, <75% oral intake for >1 month, has subcutaneous fat and muscle loss on exam.   - Continue Remeron 15 mg at bedtime     Monoclonal gammopathy of uncertain significance  No known lytic lesions.  Increasing mediastinal lymphadenopathy noted on admission CT imaging.    * LAD may be related to possible infection, but leukocytosis appears rather chronic and is afebrile without hypoxia  - Oncology consult as above - recommending bone marrow biopsy at some point      Collagenous colitis  - Continue PTA budesonide 3 mg daily      Failure to thrive  Fall, weakness  Per son, it sounds like the patient has been declining functionally for the past several weeks. I suspect this is multifactorial in the setting of possible pneumonia, protein calorie malnutrition, severe aortic stenosis, and depression/grief from the recent passing of his wife.    PT consulted during this admission, but today (1/14) the patient declined to work with PT. Discussed that if he wants to get any stronger he needs to work with therapy.   - consult PT    Goals of treatment  * See progress note by Cee Thomason 1/11 regarding goals of care discussion.    * Had another discussion with patient and son at bedside regarding code status 1/12.  Discussed very low likelihood of  successful resuscitation or wean from vent with high risk of injury and painful final moments with CPR and strongly recommended DNR/DNI status.  He states he would like to think about it, but at this time prefers to remain FULL code.        Diet: Pureed Diet (level 4) Mildly Thick (level 2)  Snacks/Supplements Adult: Other; Chocolate Vital Cuisine daily at 2 pm; Between Meals    DVT Prophylaxis: Pneumatic Compression Devices  Bravo Catheter: Not present  Central Lines: None  Code Status: Full Code Full code per patient    Disposition Plan   Expected Discharge: 01/15/2022     Anticipated discharge location: home with help/services    Delays:     Placement - Homecare          The patient's care was discussed with the Bedside Nurse, Patient and Patient's Family.    See plan above and goals of treatment above regarding discussion.     Lashawn Chiang  Hospitalist Service  [facility]  Securely message with the Vocera Web Console (learn more here)  Text page via [email or web address] Paging/Directory      ______________________________________________________________________    Interval History   He reports feeling ok. Feeling a little better today. Hasn't been up out of bed yet. Eating ok but doesn't really like his diet.     Data reviewed today: I reviewed all medications, new labs and imaging results over the last 24 hours. I personally reviewed no images or EKG's today.    Physical Exam   Vital Signs: Temp: 97.3  F (36.3  C) Temp src: Oral BP: 94/62 Pulse: 69   Resp: 16 SpO2: 97 % O2 Device: None (Room air)    Weight: 133 lbs 12.8 oz  General Appearance: Thin elderly male in NAD, frail appearing and appears older than stated age.   Respiratory: respirations non-labored on room air, coarse breaths sounds bilaterally   Cardiovascular: RRR, systolic murmur   GI: soft, non-distended, non-tender   Skin: no rashes or lesions   Other: Alert and appropriate, cranial nerves grossly intact      Data   Recent Labs   Lab 01/15/22  0635 01/14/22  0913 01/13/22  0623 01/12/22  0608   WBC 11.0  --  12.7* 14.0*   HGB 10.2*  --  10.2* 10.8*     --  100 98     --  306 303     --  140 139   POTASSIUM 3.5 3.5 3.3* 3.6   CHLORIDE 109  --  108 106   CO2 29  --  26 30   BUN 14  --  13 17   CR 0.79  --  0.81 0.93   ANIONGAP 3  --  6 3   ULISSES 8.1*  --  8.0* 8.3*   GLC 83  --  87 79     Recent Results (from the past 24 hour(s))   MR Cervical Spine w/o & w Contrast    Narrative    EXAM: MR CERVICAL SPINE W/O and W CONTRAST  LOCATION: [facility]  DATE/TIME: 1/14/2022 10:19 PM    INDICATION: Worsening gait and possible demyelinating process.  COMPARISON: None.  CONTRAST: 6mL Gadavist  TECHNIQUE: MRI Cervical Spine without and with IV contrast.    FINDINGS:   There is a slight retrolisthesis of C3 in relation to C4 and C4 in relation to C5 along with a slight anterior listhesis at C7 in relation to T1. The vertebral body heights are well-maintained throughout. There is fatty marrow replacement of the upper   cervical spine most likely related to radiation therapy for the patient's known vocal cord tumor. There is no abnormal signal or change in size of the cervical spinal cord. There is no evidence of an intracanal mass or hemorrhage. Following the   administration of contrast no abnormal enhancement visualized. No extraspinal abnormality.    Craniovertebral junction and C1-C2: Normal.    C2-C3: There is a slight decrease in disc space height and signal. There is a diffuse annular bulge but no evidence of canal compromise. There is facet arthropathy and uncinate spurring leading to mild right neural foraminal narrowing.     C3-C4: There is a significant loss of disc space height and signal. There is a broad posterior disc osteophyte complex more prominent to the posterior lateral regions. This does lead to mild canal compromise. There is facet arthropathy and uncinate    spurring leading to significant left greater than right neural foraminal narrowing.     C4-C5: There is a significant loss of disc space height and signal. There is a broad posterior disc osteophyte complex more prominent to the posterior lateral regions. This does lead to mild canal compromise. There is uncinate spurring and facet   arthropathy leading to significant right neural foraminal narrowing and moderate left neural foraminal narrowing.     C5-C6: There is a significant loss of disc space height and signal. There is a minimal posterior disc osteophyte complex but no evidence of canal compromise. There is uncinate spurring and facet arthropathy leading to moderate bilateral neural foraminal   narrowing.     C6-C7: There is a moderate loss of disc space height and signal. There is a diffuse annular bulge more prominent to the posterior lateral regions but there is no evidence of canal compromise. There is facet arthropathy but the neural foramen are patent   bilaterally.     C7-T1: There is a mild loss of disc space height and signal. There is a mild diffuse annular bulge but no evidence of canal compromise. There is facet arthropathy but the neural foramen are patent bilaterally.      Impression    IMPRESSION:  1.  No abnormal signal or abnormal enhancement cervical spinal cord.  2.  Prominent degenerative changes leading to canal compromise or neural foraminal narrowing at multiple levels as described above.     MR Thoracic Spine w/o & w Contrast    Narrative    EXAM: MR THORACIC SPINE W/O and W CONTRAST  LOCATION: [facility]  DATE/TIME: 1/14/2022 10:20 PM    INDICATION: Worsening gait and possible demyelinating process.  COMPARISON: 09/13/2020 and 01/14/2022.  CONTRAST: 6mL Gadavist  TECHNIQUE: Routine Thoracic Spine MRI without and with IV contrast.    FINDINGS:   There is good anatomic alignment of the thoracic spine. There is a stable chronic severe compression fracture of  the L1 vertebral body status post vertebroplasty with cement material within. Again visualized is a severe compression fracture of the T12   vertebral body with mild retropulsion of its posterior wall which is stable as compared to the previous MRI. There is now acute edema present within consistent with an acute on chronic severe compression fracture. There is a severe compression fracture   of the T11 vertebral body with no evidence of retropulsion. This is new since 2020 but was present on the previous chest CT from 2022.  There is edema visualized within the superior endplate and the midportion of T11 consistent with a acute to subacute   compression fracture. The rest of the vertebral body heights are well-maintained throughout the thoracic region.     There is no abnormal signal seen involving the thoracic spinal cord or abnormal enhancement. There is no evidence of an intracanal mass or hemorrhage. There is enhancement involving the acute to subacute compression fractures of the T11 and T12 vertebral   bodies. There is no significant canal compromise or significant neural foraminal narrowing throughout the thoracic spine. There is probable interstitial lung disease involving primarily the right lung. Recommend clinical correlation with chest CT.       Impression    IMPRESSION:  1.  Acute to subacute moderate to severe compression fracture T11 vertebral body with no evidence of canal compromise.    2.  Acute on chronic severe compression fracture T12 vertebral body with no evidence of canal compromise.    3.  Stable severe chronic L1 compression fracture with cement material within.    4.  No abnormal signal or abnormal enhancement of thoracic spinal cord.    5.  No significant canal compromise or significant neural foraminal narrowing throughout thoracic spine.      no vomiting/no fever

## 2022-01-15 NOTE — PROVIDER NOTIFICATION
MD notified of pt receiving IV contrast twice today, NPO for fasting labs in AM. MD called back, started on NS at 75 for 12h due to extensive cardiac history, severe aortic stenosis.

## 2022-01-16 NOTE — PROGRESS NOTES
A/OX4. VSS on RA, ex hypotension() today.HIGH, weak. Pureed diet/mild thick liquid, appetite is fair.Denies pain. Continues on IV abx for PNA. Seen by Neurology and Pulmonology today, refer to notes. PT rec home with home care physical therapy vs TCU. SW is following for discharge.

## 2022-01-16 NOTE — PLAN OF CARE
A/O x4. VSS on RA ex soft BP. Denies pain. Up with Ax1/GB/walker. Tolerating pureed diet. PIV SL. Neuro/ pulmonology following. Discharge pending,  SW following.

## 2022-01-17 NOTE — PROGRESS NOTES
[facility]    Medicine Progress Note - Hospitalist Service       Date of Admission:  1/10/2022    Assessment & Plan             Efrem Ramos is a 76 year old male admitted on 1/10/2022.   Complex past history including severe aortic stenosis, A-fib/flutter, HTN, CAD s/p PCI, pulmonary fibrosis, dysphagia among other chronic medical issues who with leukocytosis, increasing coughing, poor intake, fatigue.  Son reports patient had respiratory illness about 2 weeks ago with ongoing productive cough; son reporting increased HIGH as well as worsening oral intake.  This led to evaluation at Urgent Care with finding of leukocytosis of 20 so referred to ED.    Primarily suspect presentation is combination of possible CAP (though unclear) with deconditioning related to severe malnutrition in setting of dysphagia and esophageal dysmotility and multiple other underlying comorbidities.  May well be component of grieving vs depression given the death of his wife in September 2021. It does not appear as though there are any other acute issues to address this hospitalization. Pt will need TCU placement.      Community Acquired Pneumonia with Moraxella Catarrhalis   Leukocytosis  Admission leukocytosis of 21.7 but procal <0.05 and afebrile without hypoxia; though reporting a productive cough with son reporting increase HIGH.  COVID/flu testing negative.   * Admission CT chest showing progressed emphysema, progressed fibrotic changes with bronchiectasis and consolidation consistent with UIP, cannot rule out infection; also noting increased LAD.  * Initially monitored off abx and WBC improved to 14 without intervention  * peripheral smear negative for dysplastic or atypical cells  - sputum culture positive for Moraxella catarrhalis - without hypoxia or fever, Pulm consulted and thinks this likely represents true infection and would explain his cough and interstitial infiltrates on imaging.   - Continue  treating with ceftriaxone while hospitalized - can transition to cefdinir 300mg daily for a total of 10 days of antibiotic therapy.   - Follow CBC  - Incentive spirometry and acapella     Failure to thrive  Fall,  Generalized weakness  Memory and cognitive impairment of unclear etiology  Per son, it sounds like the patient has been declining functionally for the past several weeks. I suspect this is multifactorial in the setting of possible pneumonia, protein calorie malnutrition, severe aortic stenosis, and depression/grief from the recent passing of his wife.    PT consulted during this admission and are recommending TCU.   - consult PT  - CC/SW consult for placement   - Consider outpatient neuropsychologic testing  - Consider inpatient psych consult, did not discuss this with him.     ADEM (Acute disseminated encephalomyelitis)   Diagnosed back in the 90s. He was apparently hospitalized for 2 months prior to reaching the diagnosis. He has had residual right sided weakness and dysphagia as a result. No records of this in Care Everywhere and does not regularly follow with a neurologist. His son was wondering if this is contributing to his present issues (I.e is this recurring or getting worse). Discussed that I am not familiar with this diagnosis. Neurology consulted and do not think this is contributing. They did obtain Brain and spine MRI. No enhancing lesions to indicate acute demyelinating inflammatory disease.  - Neurology consult placed, appreciated assistance.   - Likely not contributing to current presentation     Mod-severe oropharyngeal dysphagia  Esophageal dysmotility  Hx esophageal stricture s/p dilation 2018  Hx vocal fold malignancy  Reports lack of appetite due to significant work of eating, likely related to poor dentition, dysphagia and esophageal dysmotility; noting increased difficulty with certain textures and restricting intake due to fear of choking.  * [facility] consulted, esophagram 1/11 showing  severe tertiary contractions and esophageal dysmotility, no definite fixed strictures  * video swallow 1/12 with mod-severe oropharyngeal dysphagia  - diet per SLP - currently recommending pureed diet with thickened liquids   * apparently this is NOT a new issue for the patient and has been going on for years since his diagnosis of ADEM. He does not really like the modified diet.     Moderate to severe protein calorie nutrition  Most likely related to dysphagia and esophageal dysmotility.  May consider also cardiac wasting in the setting of known severe valvular heart disease.  * Has had >5% weight loss in 1 month, <75% oral intake for >1 month, has subcutaneous fat and muscle loss on exam.   - Continue Remeron 15 mg at bedtime     Severe valvular heart disease   severe aortic stenosis with aortic valve area of 0.8 cm  severe tricuspid regurgitation w/ resultant   chronic valvular heart failure  Most recent echocardiogram December 6, 2021 with preserved LVEF, moderately dilated right ventricle.  Patient is followed by cardiology, recently discussed at TAVR conference, with son reporting he is not felt to be a candidate due to high risk.  - continue BB, statin     Severe coronary artery disease  Rotational arthrectomy and drug-eluting stent x4 to proximal to distal RCA in May 2020.  Complicated by left femoral artery bleed with retroperitoneal and scrotal hematoma.  -Continue PTA Plavix, atorvastatin     Persistent atrial fibrillation; [id number] of 6  - continue PTA Eliquis, digoxin, metoprolol  - Cardiac telemetry    Pulmonary fibrosis  Possibly related to historical amiodarone toxicity?  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       Monoclonal gammopathy of uncertain significance  No known lytic lesions.  Increasing mediastinal lymphadenopathy noted on admission CT imaging.    * LAD may be related to possible infection, but leukocytosis appears rather chronic and is  afebrile without hypoxia  - Oncology consult as above - recommending bone marrow biopsy at some point      Collagenous colitis  - Continue PTA budesonide 3 mg daily     Diarrhea  1/16 pt noted some looser stools today. Possibly antibiotic related.   - Start probiotic     T11 and T12 compression fracture   Acute to subacute moderate to severe compression fracture T11 vertebral body with no evidence of canal compromise. Acute on chronic severe compression fracture T12 vertebral body with no evidence of canal compromise.  - Ortho spine consult     Goals of treatment  * See progress note by Cee Thomason 1/11 regarding goals of care discussion.    * Prior provider had another discussion with patient and son at bedside regarding code status 1/12.  Discussed very low likelihood of successful resuscitation or wean from vent with high risk of injury and painful final moments with CPR and strongly recommended DNR/DNI status.  He states he would like to think about it, but at this time prefers to remain FULL code.        Diet: Pureed Diet (level 4) Mildly Thick (level 2)  Snacks/Supplements Adult: Other; Chocolate Vital Cuisine daily at 2 pm; Between Meals    DVT Prophylaxis: Pneumatic Compression Devices  Bravo Catheter: Not present  Central Lines: None  Code Status: Full Code Full code per patient    Disposition Plan   Expected Discharge: Pt is likely medically stable for discharge, will need to find TCU      Anticipated discharge location: home with help/services    Delays:     Placement - Homecare          The patient's care was discussed with the Bedside Nurse, Patient and Patient's Family.    See plan above and goals of treatment above regarding discussion.       Lashawn Chiang  Hospitalist Service  [facility]  Securely message with the Vocera Web Console (learn more here)  Text page via [employer]  [employer]      ______________________________________________________________________    Interval History   He reports feeling ok. He is eating well, but doesn't really like his pureed diet. He has started to have some loose stools today. No shortness of breath. Cough is improving.     Data reviewed today: I reviewed all medications, new labs and imaging results over the last 24 hours. I personally reviewed no images or EKG's today.    Physical Exam   Vital Signs: Temp: 97.5  F (36.4  C) Temp src: Axillary BP: 111/79 Pulse: 64   Resp: 18 SpO2: 95 % O2 Device: None (Room air)    Weight: 135 lbs 3.2 oz  General Appearance: Thin elderly male in NAD, frail appearing and appears older than stated age.   Respiratory: respirations non-labored on room air, coarse breaths sounds bilaterally   Cardiovascular: RRR, systolic murmur   GI: soft, non-distended, non-tender   Skin: no rashes or lesions   Other: Alert and appropriate, cranial nerves grossly intact     Data   Recent Labs   Lab 01/15/22  0635 01/14/22  0913 01/13/22  0623 01/12/22  0608   WBC 11.0  --  12.7* 14.0*   HGB 10.2*  --  10.2* 10.8*     --  100 98     --  306 303     --  140 139   POTASSIUM 3.5 3.5 3.3* 3.6   CHLORIDE 109  --  108 106   CO2 29  --  26 30   BUN 14  --  13 17   CR 0.79  --  0.81 0.93   ANIONGAP 3  --  6 3   ULISSES 8.1*  --  8.0* 8.3*   GLC 83  --  87 79     No results found for this or any previous visit (from the past 24 hour(s)).

## 2022-01-17 NOTE — CONSULTS
Initial consult completed on 1/11/22. See initial consult note.    Consult cleared. SW to continue to follow.    DAVIE Calles, LGSW    [facility]

## 2022-01-17 NOTE — PROGRESS NOTES
[facility]    Medicine Progress Note - Hospitalist Service       Date of Admission:  1/10/2022    Assessment & Plan                  Efrem Ramos is a 76 year old male admitted on 1/10/2022.   Complex past history including severe aortic stenosis, A-fib/flutter, HTN, CAD s/p PCI, pulmonary fibrosis, dysphagia among other chronic medical issues who with leukocytosis, increasing coughing, poor intake, fatigue.  Son reports patient had respiratory illness about 2 weeks ago with ongoing productive cough; son reporting increased HIGH as well as worsening oral intake.  This led to evaluation at Urgent Care with finding of leukocytosis of 20 so referred to ED.     Primarily suspect presentation is combination of possible CAP (though unclear) with deconditioning related to severe malnutrition in setting of dysphagia and esophageal dysmotility and multiple other underlying comorbidities.  May well be component of grieving vs depression given the death of his wife in September 2021. It does not appear as though there are any other acute issues to address this hospitalization. Pt will need TCU placement.      Community Acquired Pneumonia with Moraxella Catarrhalis   Leukocytosis  Admission leukocytosis of 21.7 but procal <0.05 and afebrile without hypoxia; though reporting a productive cough with son reporting increase HIGH.  COVID/flu testing negative.   * Admission CT chest showing progressed emphysema, progressed fibrotic changes with bronchiectasis and consolidation consistent with UIP, cannot rule out infection; also noting increased LAD.  * Initially monitored off abx and WBC improved to 14 without intervention  * peripheral smear negative for dysplastic or atypical cells  - sputum culture positive for Moraxella catarrhalis - without hypoxia or fever, Pulm consulted and thinks this likely represents true infection and would explain his cough and interstitial infiltrates on imaging.   - Continue  treating with ceftriaxone while hospitalized - can transition to cefdinir 300mg daily for a total of 10 days of antibiotic therapy.   - Follow CBC  - Incentive spirometry and acapella      Failure to thrive  Fall,  Generalized weakness  Memory and cognitive impairment of unclear etiology  Per son, it sounds like the patient has been declining functionally for the past several weeks. I suspect this is multifactorial in the setting of possible pneumonia, protein calorie malnutrition, severe aortic stenosis, and depression/grief from the recent passing of his wife.    PT consulted during this admission and are recommending TCU.   - consult PT  - CC/SW consult for placement   - Consider outpatient neuropsychologic testing  - Consider inpatient psych consult, did not discuss this with him.      ADEM (Acute disseminated encephalomyelitis)   Diagnosed back in the 90s. He was apparently hospitalized for 2 months prior to reaching the diagnosis. He has had residual right sided weakness and dysphagia as a result. No records of this in Care Everywhere and does not regularly follow with a neurologist. His son was wondering if this is contributing to his present issues (I.e is this recurring or getting worse). Discussed that I am not familiar with this diagnosis. Neurology consulted and do not think this is contributing. They did obtain Brain and spine MRI. No enhancing lesions to indicate acute demyelinating inflammatory disease.  - Neurology consult placed, appreciated assistance.   - Likely not contributing to current presentation     Mod-severe oropharyngeal dysphagia  Esophageal dysmotility  Hx esophageal stricture s/p dilation 2018  Hx vocal fold malignancy  Reports lack of appetite due to significant work of eating, likely related to poor dentition, dysphagia and esophageal dysmotility; noting increased difficulty with certain textures and restricting intake due to fear of choking.  * [facility] consulted, esophagram 1/11  showing severe tertiary contractions and esophageal dysmotility, no definite fixed strictures  * video swallow 1/12 with mod-severe oropharyngeal dysphagia  - diet per SLP - currently recommending pureed diet with thickened liquids   * apparently this is NOT a new issue for the patient and has been going on for years since his diagnosis of ADEM. He does not really like the modified diet.      Moderate to severe protein calorie nutrition  Most likely related to dysphagia and esophageal dysmotility.  May consider also cardiac wasting in the setting of known severe valvular heart disease.  * Has had >5% weight loss in 1 month, <75% oral intake for >1 month, has subcutaneous fat and muscle loss on exam.   - Continue Remeron 15 mg at bedtime     Severe valvular heart disease   severe aortic stenosis with aortic valve area of 0.8 cm  severe tricuspid regurgitation w/ resultant   chronic valvular heart failure  Most recent echocardiogram December 6, 2021 with preserved LVEF, moderately dilated right ventricle.  Patient is followed by cardiology, recently discussed at TAVR conference, with son reporting he is not felt to be a candidate due to high risk.  - continue BB, statin     Severe coronary artery disease  Rotational arthrectomy and drug-eluting stent x4 to proximal to distal RCA in May 2020.  Complicated by left femoral artery bleed with retroperitoneal and scrotal hematoma.  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       Monoclonal gammopathy of uncertain significance  No known lytic lesions.  Increasing mediastinal lymphadenopathy noted on admission CT imaging.    * LAD may be related to possible infection, but leukocytosis appears rather  chronic and is afebrile without hypoxia  - Oncology consult as above - recommending bone marrow biopsy at some point      Collagenous colitis  - Continue PTA budesonide 3 mg daily      Diarrhea  1/16 pt noted some looser stools today. Possibly antibiotic related.   - Start probiotic      T11 and T12 compression fracture   Acute to subacute moderate to severe compression fracture T11 vertebral body with no evidence of canal compromise. Acute on chronic severe compression fracture T12 vertebral body with no evidence of canal compromise.  - Ortho spine consult      Goals of treatment  * See progress note by Cee Thomason 1/11 regarding goals of care discussion.    * Prior provider had another discussion with patient and son at bedside regarding code status 1/12.  Discussed very low likelihood of successful resuscitation or wean from vent with high risk of injury and painful final moments with CPR and strongly recommended DNR/DNI status.  He states he would like to think about it, but at this time prefers to remain FULL code.          Diet: Pureed Diet (level 4) Mildly Thick (level 2)  Snacks/Supplements Adult: Other; Chocolate Vital Cuisine daily at 2 pm; Between Meals    DVT Prophylaxis: DOAC  Bravo Catheter: Not present  Central Lines: None  Code Status: Full Code      Disposition Plan   Expected Discharge:    Anticipated discharge location: home with help/services    Delays:     Placement - Homecare            The patient's care was discussed with the Bedside Nurse.    Fernanda Cleaning MD  Hospitalist Service  [facility]  Securely message with the Vocera Web Console (learn more here)  Text page via [employer] Paging/Directory        Clinically Significant Risk Factors Present on Admission                    ______________________________________________________________________    Interval History   No acute overnight events.    Data reviewed today: I reviewed all medications, new labs and imaging  results over the last 24 hours. I personally reviewed no images or EKG's today.    Physical Exam   Vital Signs: Temp: 97.3  F (36.3  C) Temp src: Oral BP: 96/61 Pulse: 70   Resp: 16 SpO2: 92 % O2 Device: None (Room air)    Weight: 133 lbs 4.8 oz  General Appearance: Well appearing for stated age.  Respiratory: CTAB, no rales or ronchi  Cardiovascular: S1, S2 normal, no murmurs  GI: non-tender on palpation, BS present      Data   Recent Labs   Lab 01/17/22  0708 01/15/22  0635 01/14/22  0913 01/13/22  0623 01/12/22  0608   WBC  --  11.0  --  12.7* 14.0*   HGB  --  10.2*  --  10.2* 10.8*   MCV  --  100  --  100 98   PLT  --  313  --  306 303    141  --  140 139   POTASSIUM 3.5 3.5 3.5 3.3* 3.6   CHLORIDE 108 109  --  108 106   CO2 25 29  --  26 30   BUN 11 14  --  13 17   CR 0.79 0.79  --  0.81 0.93   ANIONGAP 5 3  --  6 3   ULISSES 8.0* 8.1*  --  8.0* 8.3*   GLC 81 83  --  87 79     No results found for this or any previous visit (from the past 24 hour(s)).

## 2022-01-17 NOTE — PROGRESS NOTES
A/OX4. VSS on RA, ex hypotension() today.HIGH, weak. Pureed diet/mild thick liquid, appetite is fair. Does like to eat lunch.Denies pain. Continues on IV abx for PNA. PT rec home with home care physical therapy vs TCU.Spine Surgery consulted, yet to see the pt. SW is following for discharge

## 2022-01-17 NOTE — CONSULTS
Thoracic spine MRI reveals sub acute T11 compression fracture in addition to old T12 and L1 compression fractures. There is no cord compression. No treatment is indicated for these fractures. He is at risk of future spontaneous fractures given his underlying osteoporosis.

## 2022-01-17 NOTE — PLAN OF CARE
A/O x4. VSS on RA ex soft BP. Denies pain. Up with Ax1/GB/walker. Tolerating pureed diet/mild thick. PIV SL. Neuro/ pulmonology following. Ortho spine consult pending. Discharge pending,  SW following.

## 2022-01-17 NOTE — PROGRESS NOTES
CLINICAL NUTRITION SERVICES - REASSESSMENT NOTE    Recommendations Ordered by Registered Dietitian (RD):   Vital Cuisine supplement daily    Malnutrition: (1/12)   % Weight Loss:  > 5% in 1 month (severe malnutrition)  % Intake:  <75% for >/= 1 month (moderate malnutrition)  Subcutaneous Fat Loss:  Orbital region moderate depletion and Upper arm region severe depletion  Muscle Loss:  Temporal region severe depletion, Clavicle bone region severe depletion, Acromion bone region moderate depletion, Scapular bone region moderate depletion, Dorsal hand region severe depletion, Patellar region moderate depletion and Posterior calf region moderate depletion  Fluid Retention:  None noted     Malnutrition Diagnosis: Severe malnutrition  In Context of:  Acute illness or injury     EVALUATION OF PROGRESS TOWARD GOALS   Diet: Pureed Diet (Level 4) + Mildly Thick liquids   Vital Cuisine ordered @ 2pm   Intake/Tolerance:   - Tolerating 100% of most meals. Per review of meal orders, pt has been receiving meals BID  - Pt was soundly sleeping during visit, did not waken to voice.   - Had previously reported reduced appetite due to work of eating and fear of choking.     - Stooling:   Last BM x3 on 1/16  BM x2 on 1/15     1/16- if eating both meals + 1 snack: 1627 kcal, 63 g protein  1/15- 1515 kcal, 60 g pro  1/14- 1283 kcal, 54 g pro  1/13 - 1223 kcal, 53 g pro    ASSESSED NUTRITION NEEDS:  Dosing Weight 59.5 kg   Estimated Energy Needs: 3288-7595 kcals (30-35 Kcal/Kg)  Justification: repletion  Estimated Protein Needs:  grams protein (1.3-1.8 g pro/Kg)  Justification: repletion and hypercatabolism with acute illness    NEW FINDINGS:   - Discharge to home vs TCU.     Previous Goals:   Patient will continue to consume at least 75% of meals and will consume % of supplement   Evaluation: Met - eating 100% of most meals. Unable to evaluate supplement intake due to pt sleeping - though did not see any sitting in room so  could infer he is drinking some.     Previous Nutrition Diagnosis:   Malnutrition related to weight loss, reduced intake, and fat and muscle loss as evidenced by coding for severe malnutrition   Evaluation: No change    CURRENT NUTRITION DIAGNOSIS  Malnutrition related to weight loss, reduced intake, and fat and muscle loss as evidenced by coding for severe malnutrition     INTERVENTIONS  Recommendations / Nutrition Prescription  Continue diet as tolerated per SLP  Vital Cuisine daily at 2pm     Implementation  No changes today. Continue to encourage adequate intakes.     Goals  Patient will continue to consume at least 75% of meals and will consume % of supplement     MONITORING AND EVALUATION:  Progress towards goals will be monitored and evaluated per protocol and Practice Guidelines    Manjula Lau RD, LD  [facility], Ortho, Ortho Spine  Pager: [phone]  Weekend Pager: [phone]

## 2022-01-18 NOTE — PLAN OF CARE
INPATIENT STATUS. A&Ox4 and VSS on RA. Soft BP. Up assist of 1 with gb/w and tolerating pureed diet with mildly thick liquids. IV SL and denies pain. PT, SW, SLP, Neuro consult. Continue to monitor.

## 2022-01-18 NOTE — PROGRESS NOTES
[facility]    Medicine Progress Note - Hospitalist Service       Date of Admission:  1/10/2022    Assessment & Plan                  Efrem Ramos is a 76 year old male admitted on 1/10/2022.   Complex past history including severe aortic stenosis, A-fib/flutter, HTN, CAD s/p PCI, pulmonary fibrosis, dysphagia among other chronic medical issues who with leukocytosis, increasing coughing, poor intake, fatigue.  Son reports patient had respiratory illness about 2 weeks ago with ongoing productive cough; son reporting increased HIGH as well as worsening oral intake.  This led to evaluation at Urgent Care with finding of leukocytosis of 20 so referred to ED.     Primarily suspect presentation is combination of possible CAP (though unclear) with deconditioning related to severe malnutrition in setting of dysphagia and esophageal dysmotility and multiple other underlying comorbidities.  May well be component of grieving vs depression given the death of his wife in September 2021. It does not appear as though there are any other acute issues to address this hospitalization. Pt will need TCU placement.      Community Acquired Pneumonia with Moraxella Catarrhalis   Leukocytosis  Admission leukocytosis of 21.7 but procal <0.05 and afebrile without hypoxia; though reporting a productive cough with son reporting increase HIGH.  COVID/flu testing negative.   * Admission CT chest showing progressed emphysema, progressed fibrotic changes with bronchiectasis and consolidation consistent with UIP, cannot rule out infection; also noting increased LAD.  * Initially monitored off abx and WBC improved to 14 without intervention  * peripheral smear negative for dysplastic or atypical cells  - sputum culture positive for Moraxella catarrhalis - without hypoxia or fever, Pulm consulted and thinks this likely represents true infection and would explain his cough and interstitial infiltrates on imaging.   Plan  --  improving  -- Continue treating with ceftriaxone while hospitalized - can transition to cefdinir 300mg daily for a total of 10 days of antibiotic therapy.   -- Incentive spirometry and acapella      Failure to thrive  Fall,  Generalized weakness  Memory and cognitive impairment of unclear etiology  Per son, it sounds like the patient has been declining functionally for the past several weeks. I suspect this is multifactorial in the setting of possible pneumonia, protein calorie malnutrition, severe aortic stenosis, and depression/grief from the recent passing of his wife.    PT consulted during this admission and are recommending TCU.   Plan  - PT consult: input appreciated. TCU recommended on discharge  - CC/SW consult for placement   - Consider outpatient neuropsychologic testing  - Consider inpatient psych consult, did not discuss this with him.      ADEM (Acute disseminated encephalomyelitis)   Diagnosed back in the 90s. He was apparently hospitalized for 2 months prior to reaching the diagnosis. He has had residual right sided weakness and dysphagia as a result. No records of this in Care Everywhere and does not regularly follow with a neurologist. His son was wondering if this is contributing to his present issues (I.e is this recurring or getting worse). Discussed that I am not familiar with this diagnosis. Neurology consulted and do not think this is contributing. They did obtain Brain and spine MRI. No enhancing lesions to indicate acute demyelinating inflammatory disease.  Plan  - Neurology consult placed, appreciated assistance.   - Likely not contributing to current presentation     Mod-severe oropharyngeal dysphagia  Esophageal dysmotility  Hx esophageal stricture s/p dilation 2018  Hx vocal fold malignancy  Reports lack of appetite due to significant work of eating, likely related to poor dentition, dysphagia and esophageal dysmotility; noting increased difficulty with certain textures and restricting  intake due to fear of choking.  * [facility] consulted, esophagram 1/11 showing severe tertiary contractions and esophageal dysmotility, no definite fixed strictures  * video swallow 1/12 with mod-severe oropharyngeal dysphagia  - diet per SLP - currently recommending pureed diet with thickened liquids   * apparently this is NOT a new issue for the patient and has been going on for years since his diagnosis of ADEM. He does not really like the modified diet.      Moderate to severe protein calorie nutrition  Most likely related to dysphagia and esophageal dysmotility.  May consider also cardiac wasting in the setting of known severe valvular heart disease.  * Has had >5% weight loss in 1 month, <75% oral intake for >1 month, has subcutaneous fat and muscle loss on exam.     Plan  - Continue Remeron 15 mg at bedtime     Severe valvular heart disease   severe aortic stenosis with aortic valve area of 0.8 cm  severe tricuspid regurgitation w/ resultant   chronic valvular heart failure  Most recent echocardiogram December 6, 2021 with preserved LVEF, moderately dilated right ventricle.  Patient is followed by cardiology, recently discussed at TAVR conference, with son reporting he is not felt to be a candidate due to high risk.  - continue BB, statin     Severe coronary artery disease  Rotational arthrectomy and drug-eluting stent x4 to proximal to distal RCA in May 2020.  Complicated by left femoral artery bleed with retroperitoneal and scrotal hematoma.  -Continue PTA Plavix, atorvastatin     Persistent atrial fibrillation; [id number] of 6  - continue PTA Eliquis, digoxin, metoprolol  - Cardiac telemetry     Pulmonary fibrosis  Possibly related to historical amiodarone toxicity?  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       Monoclonal gammopathy of uncertain significance  No known lytic lesions.  Increasing mediastinal lymphadenopathy noted on admission CT imaging.    * LAD  may be related to possible infection, but leukocytosis appears rather chronic and is afebrile without hypoxia  - Oncology consult as above - recommending bone marrow biopsy at some point      Collagenous colitis  - Continue PTA budesonide 3 mg daily      Diarrhea  1/16 pt noted some looser stools today. Possibly antibiotic related.   - Start probiotic      T11 and T12 compression fracture   Acute to subacute moderate to severe compression fracture T11 vertebral body with no evidence of canal compromise. Acute on chronic severe compression fracture T12 vertebral body with no evidence of canal compromise.  - Ortho spine consult      Goals of treatment  * See progress note by Cee Thomason 1/11 regarding goals of care discussion.    * Prior provider had another discussion with patient and son at bedside regarding code status 1/12.  Discussed very low likelihood of successful resuscitation or wean from vent with high risk of injury and painful final moments with CPR and strongly recommended DNR/DNI status.  He states he would like to think about it, but at this time prefers to remain FULL code.          Diet: Pureed Diet (level 4) Mildly Thick (level 2)  Snacks/Supplements Adult: Other; Chocolate Vital Cuisine daily at 2 pm; Between Meals    DVT Prophylaxis: DOAC  Bravo Catheter: Not present  Central Lines: None  Code Status: Full Code      Disposition Plan   Expected Discharge:    Anticipated discharge location: home with help/services    Delays:     Placement - Homecare  Complex Care            The patient's care was discussed with the Bedside Nurse.    Fernanda Cleaning MD  Hospitalist Service  [facility]  Securely message with the Vocera Web Console (learn more here)  Text page via [employer] Paging/Directory        Clinically Significant Risk Factors Present on Admission                    ______________________________________________________________________    Interval History   Patient complaining of  sore throat and tiredness this am  No other complaints.   Now willing to go to TCU    Data reviewed today: I reviewed all medications, new labs and imaging results over the last 24 hours. I personally reviewed no images or EKG's today.    Physical Exam   Vital Signs: Temp: 97.6  F (36.4  C) Temp src: Oral BP: 101/62 Pulse: 78   Resp: 18 SpO2: 92 % O2 Device: None (Room air)    Weight: 132 lbs 4.8 oz  General Appearance: Well appearing for stated age.  Respiratory: CTAB, no rales or ronchi  Cardiovascular: S1, S2 normal, no murmurs  GI: non-tender on palpation, BS present      Data   Recent Labs   Lab 01/17/22  0708 01/15/22  0635 01/14/22  0913 01/13/22  0623 01/12/22  0608   WBC  --  11.0  --  12.7* 14.0*   HGB  --  10.2*  --  10.2* 10.8*   MCV  --  100  --  100 98   PLT  --  313  --  306 303    141  --  140 139   POTASSIUM 3.5 3.5 3.5 3.3* 3.6   CHLORIDE 108 109  --  108 106   CO2 25 29  --  26 30   BUN 11 14  --  13 17   CR 0.79 0.79  --  0.81 0.93   ANIONGAP 5 3  --  6 3   ULISSES 8.0* 8.1*  --  8.0* 8.3*   GLC 81 83  --  87 79     No results found for this or any previous visit (from the past 24 hour(s)).

## 2022-01-18 NOTE — PROGRESS NOTES
MD Notification    Notified Person: PA    Notified Person Name: ANGIE Kelly    Notification Date/Time: 1/17/22 at 2000    Notification Interaction: Page     Purpose of Notification: BP 89/49. Asymptomatic. Please advise    Orders Received:    Comments: Awaiting call back

## 2022-01-18 NOTE — CONSULTS
Care Management Initial Consult    General Information  Assessment completed with: Patient, Patient (Patient)  Type of CM/SW Visit: Initial Assessment    Primary Care Provider verified and updated as needed: Yes   Readmission within the last 30 days: no previous admission in last 30 days      Reason for Consult: discharge planning  Advance Care Planning: Advance Care Planning Reviewed: other (comment) (No acp docs)   (no acp documents)       Communication Assessment  Patient's communication style: spoken language (English or Bilingual)    Hearing Difficulty or Deaf: no   Wear Glasses or Blind: yes    Cognitive  Cognitive/Neuro/Behavioral: WDL                      Living Environment:   People in home: child(олег), adult  Son Efrem  Current living Arrangements: house      Able to return to prior arrangements: other (see comments) (After TCU stay)  Living Arrangement Comments:  (TCU is recommended v home with assist)    Family/Social Support:  Care provided by: self,child(олег)  Provides care for: no one  Marital Status:   Children          Description of Support System: Supportive,Involved    Support Assessment: Adequate family and caregiver support,Adequate social supports    Current Resources:   Patient receiving home care services: No     Community Resources: None  Equipment currently used at home: cane, straight  Supplies currently used at home: None    Employment/Financial:  Employment Status: retired        Financial Concerns: No concerns identified   Referral to Financial Counselor: No       Lifestyle & Psychosocial Needs:  Social Determinants of Health     Tobacco Use: Medium Risk     Smoking Tobacco Use: Former Smoker     Smokeless Tobacco Use: Never Used   Alcohol Use: Not on file   Financial Resource Strain: Not on file   Food Insecurity: Not on file   Transportation Needs: Not on file   Physical Activity: Not on file   Stress: Not on file   Social Connections: Not on file   Intimate Partner Violence:  Not on file   Depression: Not at risk     PHQ-2 Score: 0   Housing Stability: Not on file       Functional Status:  Prior to admission patient needed assistance:              Mental Health Status:          Chemical Dependency Status:                Values/Beliefs:  Spiritual, Cultural Beliefs, Jainism Practices, Values that affect care: no               Additional Information:  Per care management/social work consult for discharge planning and TCU placement on discharge. Patient was admitted on 1/10/22 with an elevated white count. Reviewed chart and spoke with patient to discuss discharge plans. Per patient and chart report patient is living home with his adult son and is independent at baseline with mobility and ADLS. Reviewed therapy discharge recommendations for TCU placement on discharge and patient is in agreement. Patient states his goal post TCU stay is to return back home. Patient reports he has been/and would like to go to either [facility] and [facility]. Patient gave permission for writer to make referrals to other facilities with high ratings and patient lives in [place]. Additional referrals sent to [facility], [facility], [facility], and [facility] via DOD and awaiting bed availability.    Will continue to follow.     DAVIE Calles, LGSW    [facility]

## 2022-01-18 NOTE — PLAN OF CARE
INPATIENT STATUS. A&Ox4 and VSS on RA. Soft BP. Up assist of 1 with gb/w and tolerating pureed diet with mildly thick liquids. IV SL and denies pain. PT, SW, SLP, Neuro consult. Plan to discharge to TCU. Continue to monitor.

## 2022-01-18 NOTE — PROGRESS NOTES
Care Management Follow Up    Length of Stay (days): 7    Expected Discharge Date: 01/18/2022  Concerns to be Addressed: discharge planning     Patient plan of care discussed at interdisciplinary rounds: Yes  Anticipated Discharge Disposition: Home,Home Care    Anticipated Discharge Services: None  Anticipated Discharge DME: None    Patient/family educated on Medicare website which has current facility and service quality ratings: no  Education Provided on the Discharge Plan:    Patient/Family in Agreement with the Plan: yes    Referrals Placed by CM/SW:    Private pay costs discussed: Not applicable    Additional Information:  Will meet with pt to evaluate pt / family plan for TCU vs home with home health & 24/7 assist recommendations.     Per PT note  PT Discharge Recommendation: Transitional Care Facility;home with assist;home with home care physical therapy  Pt would benefit from TCU at discharge to improve strength and independence with functional mobility. If pt were to discharge home recommend [facility] for all mobility and Home PT. Per chart, pt's son will not be able to assist during the day as he works.  Per ST note:   ST Discharge Recommendation: home with home care speech therapy;Transitional Care Facility  Pt with chronic dysphagia; improved PO tolerance with diet modifications.     Reached out to [facility] ([facility] [phone]) who has indicated availability for intake; they confirm they take pt's insurance.   Referral / notes / orders can be faxed to [phone].    Met with patient in room, introduced self and role in discharge planning.  Carefully reviewed therapy recommendations; pt states his son is his only support and does need to work; he is now is agreeable to TCU referrals now weighing the recommendations from therapy and after consideration of his son's encouragement to consider TCU.  He appreciates the handouts from medicare.gov for SNFs and HC; he would like referrals sent to [facility]  and [facility]; he will keep [facility] in mind for possible post-TCU needs.      Bella Tay RN, BSN, PHN  [facility]  Inpatient Care Management - FLOAT  Mobile: [phone] 01/18/22 until 4pm  (after today's date, please call the patient's unit)

## 2022-01-18 NOTE — CONSULTS
Please see consult completed 1/11/2022 at 4:04 PM.  Additional consult orders not necessary.    Please see followup note with current plan filed 1/18/2022 at 10:56 AM.    SURESH was updated by CM-RN and is working on TCU referrals as described.

## 2022-01-18 NOTE — PLAN OF CARE
Assumed care [id number]. Pt is A+O x 4. VSS on RA except soft BP, asymptomatic, Hospiatlist paged. Denied pain. Up with A1, GB, walker. No change in neuro. Tolerated mildy thickened water. IV SL. Voiding. PT, SW, SLP and neurology are following.

## 2022-01-18 NOTE — PLAN OF CARE
Up A1GB&W. A&Ox4. VSS RA, BP improved. LS dim, infrequent productive cough. HIGH/SOB. Denies pain. Tolerating mech alt diet 4/2. Discharge pending progress homecare vs TCU, SW following.

## 2022-01-19 NOTE — DISCHARGE SUMMARY
[facility]  Hospitalist Discharge Summary      Date of Admission:  1/10/2022  Date of Discharge:  1/19/2022  3:28 PM  Discharging Provider: Fernanda Cleaning MD  Discharge Service: Hospitalist Service    Discharge Diagnoses   Community acquired pneumonia.    Follow-ups Needed After Discharge   Follow-up Appointments     Follow Up and recommended labs and tests      Follow up with primary care provider in 7 days.  No follow up labs or   test are needed.             Unresulted Labs Ordered in the Past 30 Days of this Admission     Date and Time Order Name Status Description    1/14/2022  1:26 PM Paraneoplastic Antibodies with Reflex In process     1/14/2022  1:24 PM Myasthenia Gravis (MG) Evaulation with MuSK Reflex In process       These results will be followed up by PCP    Discharge Disposition   Discharged to short-term care facility  Condition at discharge: Stable  Patient ready to discharge to a skilled nursing facility as soon as possible in order to create capacity for patients related to the COVID-19 pandemic.    Hospital Course                    Efrem Ramos is a 76 year old male admitted on 1/10/2022.   Complex past history including severe aortic stenosis, A-fib/flutter, HTN, CAD s/p PCI, pulmonary fibrosis, dysphagia among other chronic medical issues who with leukocytosis, increasing coughing, poor intake, fatigue.  Son reports patient had respiratory illness about 2 weeks ago with ongoing productive cough; son reporting increased HIGH as well as worsening oral intake.  This led to evaluation at Urgent Care with finding of leukocytosis of 20 so referred to ED.     Primarily suspect presentation is combination of possible CAP (though unclear) with deconditioning related to severe malnutrition in setting of dysphagia and esophageal dysmotility and multiple other underlying comorbidities.  May well be component of grieving vs depression given the death of his wife in September 2021.  It does not appear as though there are any other acute issues to address this hospitalization. Pt will need TCU placement.      Community Acquired Pneumonia with Moraxella Catarrhalis   Leukocytosis  Admission leukocytosis of 21.7 but procal <0.05 and afebrile without hypoxia; though reporting a productive cough with son reporting increase HIGH.  COVID/flu testing negative.   * Admission CT chest showing progressed emphysema, progressed fibrotic changes with bronchiectasis and consolidation consistent with UIP, cannot rule out infection; also noting increased LAD.  * Initially monitored off abx and WBC improved to 14 without intervention  * peripheral smear negative for dysplastic or atypical cells  - sputum culture positive for Moraxella catarrhalis - without hypoxia or fever, Pulm consulted and thinks this likely represents true infection and would explain his cough and interstitial infiltrates on imaging.   Plan  -- improving  -- Treated with ceftriaxone while hospitalized -transition to Levaquin to complete a 10-day course of antibiotics  -- Incentive spirometry and acapella      Failure to thrive  Fall,  Generalized weakness  Memory and cognitive impairment of unclear etiology  Per son, it sounds like the patient has been declining functionally for the past several weeks. I suspect this is multifactorial in the setting of possible pneumonia, protein calorie malnutrition, severe aortic stenosis, and depression/grief from the recent passing of his wife.    PT consulted during this admission and are recommending TCU.   Plan  - PT consult: input appreciated. TCU recommended on discharge  - CC/SW consult for placement   - Consider outpatient neuropsychologic testing       ADEM (Acute disseminated encephalomyelitis)   Diagnosed back in the 90s. He was apparently hospitalized for 2 months prior to reaching the diagnosis. He has had residual right sided weakness and dysphagia as a result. No records of this in Care  Everywhere and does not regularly follow with a neurologist. His son was wondering if this is contributing to his present issues (I.e is this recurring or getting worse). Discussed that I am not familiar with this diagnosis. Neurology consulted and do not think this is contributing. They did obtain Brain and spine MRI. No enhancing lesions to indicate acute demyelinating inflammatory disease.  Plan  - Neurology consult placed, appreciated assistance.   - Likely not contributing to current presentation     Mod-severe oropharyngeal dysphagia  Esophageal dysmotility  Hx esophageal stricture s/p dilation 2018  Hx vocal fold malignancy  Reports lack of appetite due to significant work of eating, likely related to poor dentition, dysphagia and esophageal dysmotility; noting increased difficulty with certain textures and restricting intake due to fear of choking.  * [facility] consulted, esophagram 1/11 showing severe tertiary contractions and esophageal dysmotility, no definite fixed strictures  * video swallow 1/12 with mod-severe oropharyngeal dysphagia  - diet per SLP - currently recommending pureed diet with thickened liquids   * apparently this is NOT a new issue for the patient and has been going on for years since his diagnosis of ADEM. He does not really like the modified diet.      Moderate to severe protein calorie nutrition  Most likely related to dysphagia and esophageal dysmotility.  May consider also cardiac wasting in the setting of known severe valvular heart disease.  * Has had >5% weight loss in 1 month, <75% oral intake for >1 month, has subcutaneous fat and muscle loss on exam.     Plan  - Continue Remeron 15 mg at bedtime     Severe valvular heart disease   severe aortic stenosis with aortic valve area of 0.8 cm  severe tricuspid regurgitation w/ resultant   chronic valvular heart failure  Most recent echocardiogram December 6, 2021 with preserved LVEF, moderately dilated right ventricle.  Patient is  followed by cardiology, recently discussed at TAVR conference, with son reporting he is not felt to be a candidate due to high risk.  - continue BB, statin     Severe coronary artery disease  Rotational arthrectomy and drug-eluting stent x4 to proximal to distal RCA in May 2020.  Complicated by left femoral artery bleed with retroperitoneal and scrotal hematoma.  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       Monoclonal gammopathy of uncertain significance  No known lytic lesions.  Increasing mediastinal lymphadenopathy noted on admission CT imaging.    * LAD may be related to possible infection, but leukocytosis appears rather chronic and is afebrile without hypoxia  - Oncology consult as above - recommending bone marrow biopsy at some point      Collagenous colitis  - Continue PTA budesonide 3 mg daily      Diarrhea  1/16 pt noted some looser stools today. Possibly antibiotic related.   - Start probiotic      T11 and T12 compression fracture   Acute to subacute moderate to severe compression fracture T11 vertebral body with no evidence of canal compromise. Acute on chronic severe compression fracture T12 vertebral body with no evidence of canal compromise.  - Ortho spine consult      Goals of treatment  * See progress note by Cee Thomason 1/11 regarding goals of care discussion.    * Prior provider had another discussion with patient and son at bedside regarding code status 1/12.  Discussed very low likelihood of successful resuscitation or wean from vent with high risk of injury and painful final moments with CPR and strongly recommended DNR/DNI status.  He states he would like to think about it, but at this time prefers to remain FULL code.          Consultations This Hospital Stay   SPEECH LANGUAGE PATH ADULT IP CONSULT  NUTRITION SERVICES ADULT IP CONSULT  GASTROENTEROLOGY IP CONSULT  PHYSICAL THERAPY ADULT IP CONSULT  NUTRITION SERVICES ADULT IP CONSULT  CARE MANAGEMENT / SOCIAL WORK IP CONSULT  HEMATOLOGY & ONCOLOGY IP CONSULT  PULMONARY IP CONSULT  NEUROLOGY IP CONSULT  CARE MANAGEMENT / SOCIAL WORK IP CONSULT  SPINE SURGERY ADULT IP CONSULT  CARE MANAGEMENT / SOCIAL WORK IP CONSULT  CARE MANAGEMENT / SOCIAL WORK IP CONSULT  PHYSICAL THERAPY ADULT IP CONSULT  OCCUPATIONAL THERAPY ADULT IP CONSULT    Code Status   Full Code    Time Spent on this Encounter   I, Fernanda Cleaning MD, personally saw the patient today and spent greater than 30 minutes discharging this patient.       Fernanda Cleaning MD  [facility] EXTENDED RECOVERY AND SHORT STAY  [address] [address]  Phone: [phone]  ______________________________________________________________________    Physical Exam   Vital Signs: Temp: 97.5  F (36.4  C) Temp src: Oral BP: 106/69 Pulse: 87   Resp: 16 SpO2: 92 % O2 Device: None (Room air)    Weight: 132 lbs 4.8 oz  General Appearance: Well appearing for stated age.  Respiratory: CTAB, no rales or ronchi  Cardiovascular: S1, S2 normal, no murmurs  GI: non-tender on palpation, BS present         Primary Care Physician   Danny Paige MD    Discharge Orders      General info for SNF    Length of Stay Estimate: Short Term Care: Estimated # of Days <30  Condition at Discharge: Improving  Level of care:skilled   Rehabilitation Potential: Good  Admission H&P remains valid and up-to-date: Yes  Recent Chemotherapy: N/A  Use Nursing Home Standing Orders: Yes     Mantoux instructions    Give two-step Mantoux (PPD) Per Facility Policy Yes     Follow Up and recommended labs and tests    Follow up with primary care provider in 7 days.  No follow up labs or test are needed.     Reason for your hospital stay    You were  treated for community acquired Pneumonia     Activity - Up ad rocky     Full Code     Physical Therapy Adult Consult    Evaluate and treat as clinically indicated.    Reason:  Deconditioning     Occupational Therapy Adult Consult    Evaluate and treat as clinically indicated.    Reason: Deconditioning     Diet    Follow this diet upon discharge: Orders Placed This Encounter      Snacks/Supplements Adult: Other; Chocolate Vital Cuisine daily at 2 pm; Between Meals      Pureed Diet (level 4) Mildly Thick (level 2)       Significant Results and Procedures   Most Recent 3 CBC's:Recent Labs   Lab Test 01/18/22  1048 01/15/22  0635 01/13/22  0623   WBC 10.7 11.0 12.7*   HGB 10.3* 10.2* 10.2*    100 100    313 306       Discharge Medications   Current Discharge Medication List      CONTINUE these medications which have CHANGED    Details   metoprolol succinate ER (TOPROL XL) 25 MG 24 hr tablet Take 1 tablet (25 mg) by mouth daily  Qty: 90 tablet, Refills: 3    Comments: Do not take this until you have seen your PCP.  Associated Diagnoses: Paroxysmal atrial fibrillation (H)         CONTINUE these medications which have NOT CHANGED    Details   acetaminophen (TYLENOL) 325 MG tablet Take 325-650 mg by mouth every 6 hours as needed for mild pain       apixaban ANTICOAGULANT (ELIQUIS) 5 MG tablet Take 1 tablet (5 mg) by mouth 2 times daily  Qty: 180 tablet, Refills: 3    Associated Diagnoses: Chronic a-fib (H)      atorvastatin (LIPITOR) 40 MG tablet Take 1 tablet (40 mg) by mouth daily  Qty: 90 tablet, Refills: 3    Associated Diagnoses: Coronary artery disease involving native coronary artery of native heart without angina pectoris      budesonide (ENTOCORT EC) 3 MG EC capsule 9 mg daily x 6 weeks, then 6 mg daily x 2 weeks, then 3 mg daily x 2 weeks  Qty: 126 capsule, Refills: 0    Comments: Pharmacy: pt has already used 42 days, this is remainder for taper  Associated Diagnoses: Routine general medical  examination at a health care facility      calcium carbonate 600 mg-vitamin D 400 units (CALTRATE) 600-400 MG-UNIT per tablet Take 1 tablet by mouth 2 times daily       clopidogrel (PLAVIX) 75 MG tablet Take 1 tablet (75 mg) by mouth daily  Qty: 90 tablet, Refills: 1    Associated Diagnoses: Coronary artery disease involving native coronary artery of native heart without angina pectoris      digoxin (LANOXIN) 125 MCG tablet Take 1 tablet (125 mcg) by mouth daily  Qty: 90 tablet, Refills: 3    Associated Diagnoses: Chronic a-fib (H)      furosemide (LASIX) 20 MG tablet Take 1 tablet (20 mg) by mouth daily AND 0.5 tablets (10 mg) daily (with lunch).  Qty: 135 tablet, Refills: 3    Associated Diagnoses: Chronic diastolic congestive heart failure (H); Aortic valve stenosis, etiology of cardiac valve disease unspecified; Coronary artery disease involving native coronary artery of native heart without angina pectoris      gabapentin (NEURONTIN) 300 MG capsule TAKE 2 CAPSULES BY MOUTH EVERY EVENING  Qty: 180 capsule, Refills: 3    Associated Diagnoses: Restless legs syndrome (RLS)      levofloxacin (LEVAQUIN) 500 MG tablet Take 1 tablet (500 mg) by mouth daily  Qty: 3 tablet, Refills: 0      melatonin 1 MG TABS tablet Take 1 mg by mouth nightly as needed for sleep      mirtazapine (REMERON) 15 MG tablet Take 1 tablet (15 mg) by mouth At Bedtime  Qty: 90 tablet, Refills: 3    Associated Diagnoses: Depression, unspecified depression type      multivitamin (OCUVITE) TABS Take 1 tablet by mouth daily       potassium chloride ER (K-TAB/KLOR-CON) 10 MEQ CR tablet Take 1 tablet (10 mEq) by mouth daily  Qty: 90 tablet, Refills: 3    Associated Diagnoses: HTN (hypertension)      senna-docusate (SENOKOT-S/PERICOLACE) 8.6-50 MG tablet Take 2 tablets by mouth 2 times daily  Qty: 120 tablet, Refills: 3    Associated Diagnoses: Collagenous colitis           Allergies   Allergies   Allergen Reactions     Sulfa Drugs Difficulty breathing,  Swelling and Hives     Adhesive Tape Blisters     Amiodarone Other (See Comments)     Developed pleural effusion     Penicillins Other (See Comments)     Reaction occurred as a child  Other reaction(s): Hives

## 2022-01-19 NOTE — PLAN OF CARE
Alert and oriented x 4. Room air. On room air, Soft BP. Continent of bowel and bladder. Uses urinal at bedside. IV saline lock. Assist of 1 with gait belt and walker when up. Denies pain. Q 8 vitals.

## 2022-01-19 NOTE — PLAN OF CARE
Speech Language Therapy Discharge Summary    Reason for therapy discharge:    Discharged to transitional care facility.    Progress towards therapy goal(s). See goals on Care Plan in [facility] electronic health record for goal details.  Goals not met.  Barriers to achieving goals:   discharge from facility.    Therapy recommendation(s):    Continued therapy is recommended.  Rationale/Recommendations:  pt on puree diet (4) with mildly thick liquid (2) and need for ongoing intervention.    Note: pt not seen for therapy this date.

## 2022-01-19 NOTE — PROGRESS NOTES
[facility] GERIATRIC SERVICES    PRIMARY CARE PROVIDER AND CLINIC:  Danny Paige MD, MD, [address] / [place] [address]  Chief Complaint   Patient presents with     Hospital F/U      [facility] Medical Record Number:  [id number]  Place of Service where encounter took place:  [facility] (U) [25]    Efrem Ramos  is a 76 year old  (1945), admitted to the above facility from  [facility]. Hospital stay 1/10/22 through 1/19/22..   HPI:    PMH severe AS, atrial fib/flutter, HTN, CAD s/p PCI, pulmonary fibrosis, dysphagia who presents to ED with productive cough and SOB.   CAP with moraxella catarrhalis: leukocytosis WBC 21.7, CT on admission shows progressed emphysema, fibrotic changes with bronchiectasis and consolidation, cannot r/o infection, procalditonin < 0.05 patient afebrile without hyoxia, patient initially monitored of Abx, sputum culture crew moraxella catarrhalis, Tx with ceftriaxone while hospitalized transitioned to levaquin   Failure to thrive, cognitive impairement: son reports patient declining functionally past several weeks, wife passed recently could be depression/grief, TCU recommended  Dysphagia: ST eval 1/12/22 video swallow study done, found to have moderate to severe oropharyngeal dysphagia with silent aspiration of thin liquids, recommend pureed diet with moderately thick liquids and repeat video swallow in one week.   Hospital note as follows:   ADEM (Acute disseminated encephalomyelitis)   Diagnosed back in the 90s. He was apparently hospitalized for 2 months prior to reaching the diagnosis. He has had residual right sided weakness and dysphagia as a result. No records of this in Care Everywhere and does not regularly follow with a neurologist. His son was wondering if this is contributing to his present issues (I.e is this recurring or getting worse). Discussed that I am not familiar with this diagnosis. Neurology consulted and do not think  "this is contributing. They did obtain Brain and spine MRI. No enhancing lesions to indicate acute demyelinating inflammatory disease.  On exam today: patient is alert, resting in bed, denies fever, chills, SOB, states he gets a little IHGH, has a cough that is improving with productive sputum that is \"mostly clear\" states he feels he is getting stronger, denies CP, palpitations, N/V/D or constipation.     CODE STATUS/ADVANCE DIRECTIVES DISCUSSION:  Full Code  CPR/Full code   ALLERGIES:   Allergies   Allergen Reactions     Sulfa Drugs Difficulty breathing, Swelling and Hives     Adhesive Tape Blisters     Amiodarone Other (See Comments)     Developed pleural effusion     Penicillins Other (See Comments)     Reaction occurred as a child  Other reaction(s): Hives      PAST MEDICAL HISTORY:   Past Medical History:   Diagnosis Date     Atrial fibrillation (H)     amiodarone therapy discontinued due to pulmonary toxicity     Atrial flutter (H)      Benign essential hypertension 11/20/2018     Cancer (H) vocal cord     Carpal tunnel syndrome     abstracted 7/3/02.     Coronary artery disease involving native coronary artery of native heart without angina pectoris 5/8/2020    Cath 5/28/2020: patent stents; Cath 5/18/2020: ISABEL x4 to RCA; Cath 3/2020: 1 vessel disease; Cath 2017: moderate 2 vessel disease     CVA (cerebral infarction) 5/5/2015     Demyelinating disease of central nervous system, unspecified (H)     abstracted 7/3/02.     Dyspnea      ENCEPHALOPATHY UNSPECIFIED  3/15/2005    acute diseminated encephalitis     Femoral artery hematoma complicating cardiac catheterization     5/18/2020     History of thrombophlebitis      Mitral valve problem 8/18/2013    TRANSTHORACIC ECHOCARDIOGRAM 08/2013 There is a linear strand like projection seen in the LV cavity in diastole that I suspect is the posterior mitral leaflet although I cannot exclude a torn chordae or small vegetation       Mixed hyperlipidemia 3/15/2005     " Nonrheumatic mitral valve stenosis      MEL (obstructive sleep apnea)      Other and unspecified noninfectious gastroenteritis and colitis(558.9)     abstracted 7/3/02.     Pneumonia 8/17/2016     PVD (peripheral vascular disease) (H)      Redundant colon     needs CT colonography     Shingles      SKIN DISORDERS NEC 3/15/2005     Sleep apnea      Sleep apnea      SVT (supraventricular tachycardia) (H)       PAST SURGICAL HISTORY:   has a past surgical history that includes NONSPECIFIC PROCEDURE (as a child); NONSPECIFIC PROCEDURE (early); Head and neck surgery; biopsy (brain 2002); Thoracoscopic wedge resection lung (Right, 8/2/2016); Bone marrow biopsy, bone specimen, needle/trocar (N/A, 6/8/2017); orthopedic surgery; Esophagoscopy, gastroscopy, duodenoscopy (EGD), combined (N/A, 9/8/2018); cardiac catherization (09/05/2017); Coronary Angiogram (N/A, 3/23/2020); Instantaneous Wave-Free Ratio (N/A, 3/23/2020); Right Heart Cath (N/A, 5/28/2020); Heart Catheterization with Possible Intervention (N/A, 5/28/2020); Heart Catheterization with Possible Intervention (N/A, 5/18/2020); Temporary Pacemaker Insertion (N/A, 5/18/2020); Percutaneous Coronary Intervention Stent Drug Eluting (N/A, 5/18/2020); and Percutaneous Coronary Intervention Atherectomy Orbital (N/A, 5/18/2020).  FAMILY HISTORY: family history includes Blood Disease in his mother; Cancer - colorectal in his maternal grandfather; Cardiovascular in his father; Diabetes (age of onset: 5) in his sister; Hypertension in his brother; Respiratory in an other family member.  SOCIAL HISTORY:   reports that he quit smoking about 8 years ago. His smoking use included cigarettes. He has a 30.00 pack-year smoking history. He has never used smokeless tobacco. He reports previous alcohol use of about 42.0 standard drinks of alcohol per week. He reports that he does not use drugs.  Patient's living condition: lives alone    Post Discharge Medication Reconciliation Status:  discharge medications reconciled, continue medications without change  Current Outpatient Medications   Medication Sig     acetaminophen (TYLENOL) 325 MG tablet Take 325-650 mg by mouth every 6 hours as needed for mild pain      apixaban ANTICOAGULANT (ELIQUIS) 5 MG tablet Take 1 tablet (5 mg) by mouth 2 times daily     atorvastatin (LIPITOR) 40 MG tablet Take 1 tablet (40 mg) by mouth daily     budesonide (ENTOCORT EC) 3 MG EC capsule 9 mg daily x 6 weeks, then 6 mg daily x 2 weeks, then 3 mg daily x 2 weeks     calcium carbonate 600 mg-vitamin D 400 units (CALTRATE) 600-400 MG-UNIT per tablet Take 1 tablet by mouth 2 times daily      clopidogrel (PLAVIX) 75 MG tablet Take 1 tablet (75 mg) by mouth daily     digoxin (LANOXIN) 125 MCG tablet Take 1 tablet (125 mcg) by mouth daily     furosemide (LASIX) 20 MG tablet Take 1 tablet (20 mg) by mouth daily AND 0.5 tablets (10 mg) daily (with lunch).     gabapentin (NEURONTIN) 300 MG capsule TAKE 2 CAPSULES BY MOUTH EVERY EVENING     levofloxacin (LEVAQUIN) 500 MG tablet Take 1 tablet (500 mg) by mouth daily     melatonin 1 MG TABS tablet Take 1 mg by mouth At Bedtime ALSO TAKING 1 MG PO HS PRN FOR INSOMNIA     mirtazapine (REMERON) 15 MG tablet Take 1 tablet (15 mg) by mouth At Bedtime     multivitamin (OCUVITE) TABS Take 1 tablet by mouth daily      potassium chloride ER (K-TAB/KLOR-CON) 10 MEQ CR tablet Take 1 tablet (10 mEq) by mouth daily     senna-docusate (SENOKOT-S/PERICOLACE) 8.6-50 MG tablet Take 2 tablets by mouth 2 times daily (Patient taking differently: Take 2 tablets by mouth 2 times daily as needed for constipation )     metoprolol succinate ER (TOPROL XL) 25 MG 24 hr tablet Take 1 tablet (25 mg) by mouth daily (Patient not taking: Reported on 1/20/2022)     No current facility-administered medications for this visit.       ROS:  10 point ROS of systems including Constitutional, Eyes, Respiratory, Cardiovascular, Gastroenterology, Genitourinary,  "Integumentary, Musculoskeletal, Psychiatric were all negative except for pertinent positives noted in my HPI.    Vitals:  /70   Pulse 80   Temp 97.6  F (36.4  C)   Resp 16   Ht 1.727 m (5' 8\")   Wt 62.7 kg (138 lb 3.2 oz)   SpO2 93%   BMI 21.01 kg/m    Exam:  GENERAL APPEARANCE:  Alert, in no distress  ENT:  Mouth and posterior oropharynx normal, moist mucous membranes, [place]  EYES:  EOM, conjunctivae, lids, pupils and irises normal, PERRL  RESP:  respiratory effort and palpation of chest normal, lungs clear to auscultation , no respiratory distress  CV:  Palpation and auscultation of heart done , regular rate and rhythm, no murmur, rub, or gallop, no edema  ABDOMEN:  normal bowel sounds, soft, nontender, no hepatosplenomegaly or other masses  M/S:   patient resting in bed, able to move all 4 extremities  SKIN:  Inspection of skin and subcutaneous tissue baseline  NEURO:   speech wnl  PSYCH:  affect and mood normal    Lab/Diagnostic data:  Recent labs in [facility] reviewed by me today.  and   Most Recent 3 CBC's:Recent Labs   Lab Test 01/18/22  1048 01/15/22  0635 01/13/22  0623   WBC 10.7 11.0 12.7*   HGB 10.3* 10.2* 10.2*    100 100    313 306     Most Recent 3 BMP's:Recent Labs   Lab Test 01/18/22  1048 01/17/22  0708 01/15/22  0635    138 141   POTASSIUM 3.6 3.5 3.5   CHLORIDE 108 108 109   CO2 26 25 29   BUN 12 11 14   CR 0.74 0.79 0.79   ANIONGAP 4 5 3   ULISSES 8.3* 8.0* 8.1*   GLC 85 81 83       ASSESSMENT/PLAN:    (J15.6) Pneumonia due to Moraxella catarrhalis (H)  (primary encounter diagnosis)  (J84.112) IPF (idiopathic pulmonary fibrosis) (H)  Comment: acute/ongoing  Plan: monitor SaO2 at rest and with activity, levaquin 500mg QD, budesonide taper    (R13.10) dysphagia, oropharyngeal  Video swallow study 1/12/22  Acute/ongoing  Plan: pureed diet with moderately thick liquids, ST evaluation and treatment  Repeat video swallow in 2 weeks.     (R62.7) Failure to thrive in " adult  Comment: acute/ongoing  Plan: dietician to consult, ST eval and Tx of dyphagia    (R53.81) Physical deconditioning  Comment: acute/ongoing  Plan: PT and OT for strengthening    (I25.10) Coronary artery disease involving native coronary artery of native heart without angina pectoris  (I48.20) Chronic a-fib (H)  (I10) Benign essential hypertension  (I35.0) Nonrheumatic aortic (valve) stenosis  Comment: ongoing  Plan: vitals daily and prn, BMP follow, continue digoxin 125mcg QD, lasix 20mg QD, KCL 10meq QD, metoprolol xl 25mg QD, eliquis 5mg BID, plavix 75mg QD    (Z86.73) History of CVA (cerebrovascular accident)  Comment: ongoing  Plan: continue plavix 75mg QD, lipitor 40mg QD,     Orders:  ST eval and treat  CBC and BMP on Monday      Total time spent with patient visit at the skilled nursing facility was 35 min including patient visit and review of past records. Greater than 50% of total time spent with counseling and coordinating care due to discussed patient hospitalization and medications, lab monitoring, discussed dysphagia, will have ST eval, f/u video swallow, discussed patient with nurse management, will order ST for eval and Tx during TCU stay. .     Electronically signed by:  Tonya Lynn Haase, APRN CNP

## 2022-01-19 NOTE — PLAN OF CARE
Physical Therapy Discharge Summary    Reason for therapy discharge:    Discharged to transitional care facility.    Progress towards therapy goal(s). See goals on Care Plan in [facility] electronic health record for goal details.  Goals partially met.  Barriers to achieving goals:   discharge from facility.    Therapy recommendation(s):    Continued therapy is recommended.  Rationale/Recommendations:  Pt would benefit from continued skilled PT services via TCU to improve activity tolerance, balance, and IND with safety and functional mobility.

## 2022-01-19 NOTE — PLAN OF CARE
Discharge packet sent with patient. Patient discharging to [facility] TCU, and son acting as transport.   (4) walks frequently

## 2022-01-19 NOTE — PROGRESS NOTES
Care Management Follow Up    Length of Stay (days): 8    Expected Discharge Date: 01/19/2022     Concerns to be Addressed: discharge planning     Patient plan of care discussed at interdisciplinary rounds: Yes    Anticipated Discharge Disposition: Transitional Care     Anticipated Discharge Services: None  Anticipated Discharge DME: None    Patient/family educated on Medicare website which has current facility and service quality ratings: yes  Education Provided on the Discharge Plan:    Patient/Family in Agreement with the Plan: yes    Referrals Placed by CM/SW: Post Acute Facilities,[facility]  Private pay costs discussed: Not applicable    Additional Information:  Writer is damien Campos. Writer completed Pas for anticipated discharge to [facility]. Writer faxed PAS and placed on chart.       PAS-RR    D: Per DHS regulation, SW completed and submitted PAS-RR to [place] [employer] Direct Connect via the [facility].  PAS-RR confirmation # is : [id number]    I: SW spoke with patient and they are aware a PAS-RR has been submitted.  SW reviewed with patient that they may be contacted for a follow up appointment within 10 days of hospital discharge if their SNF stay is < 30 days.  Contact information for [facility] Line was also provided.    A: patient verbalized understanding.    P: Further questions may be directed to [facility] Line at #[phone], option #4 for PAS-RR staff.    DAVIE García, LGSW   Social Work   [facility]

## 2022-01-19 NOTE — PROGRESS NOTES
Care Management Follow Up    Length of Stay (days): 8    Expected Discharge Date: 01/19/2022     Concerns to be Addressed: discharge planning     Patient plan of care discussed at interdisciplinary rounds: Yes    Anticipated Discharge Disposition: Transitional Care     Anticipated Discharge Services: None  Anticipated Discharge DME: None    Patient/family educated on Medicare website which has current facility and service quality ratings: yes  Education Provided on the Discharge Plan:    Patient/Family in Agreement with the Plan: yes    Referrals Placed by CM/SW: Post Acute Facilities,[facility]  Private pay costs discussed: private room/amenity fees and transportation costs    Additional Information:  Writer was notified that patient was accepted at [facility] in a shared room as well as [facility] in a private room with no extra fee. Writer met with patient who states he would like to go to [facility]. Patient asked writer to call his son to talk about transportation. Writer called son Efrem who states he will transport him after 3pm to [facility]. Writer updated [facility] and [facility] on the plan. Writer paged MD asking if patient is medically stable for discharge today. Waiting for response.      THEO Fuentes

## 2022-01-20 NOTE — PROGRESS NOTES
Clinic Care Coordination Contact  Care Coordination Transition Communication    Referral Source: SNF/TCU    Clinical Data:  [facility]  Hospitalist Discharge Summary       Date of Admission:  1/10/2022  Date of Discharge:  1/19/2022  3:28 PM  Discharging Provider: Fernanda Cleaning MD  Discharge Service: Hospitalist Service      Discharge Diagnoses     Community acquired pneumonia.    Transition to Facility:              Facility Name: [place] [facility]              Contact name and phone number/fax: [phone]/ [phone]    Plan: RN/SW Care Coordinator will await notification from facility staff informing RN/SW Care Coordinator of patient's discharge plans/needs. RN/SW Care Coordinator will review chart and outreach to facility staff every 4 weeks and as needed. Fax sent to TCU with my contact information requesting notification upon discharge.    Meka Burgos [facility]  Clinic Care Coordinator  [facility] [place]  [facility]  [phone]  [email or web address]

## 2022-01-20 NOTE — LETTER
[facility]   To:   [facility] TCU          Please give to facility    From:   IVIS Valadez Care Coordinator [facility]     Patient Name:  Efrem Ramos  YOB: 1945    Admit date: 1/19/2022      *Information Needed:  Please contact me when the patient will discharge (or if they will move to long term care)- include the discharge date, disposition, and main diagnosis   - If the patient is discharged with home care services, please provide the name of the agency    Phone, Fax or Email with information       Thank You,   TRISHA Valadez, MSW  Clinic Care Coordinator  [facility] [place] [place], and [place]  Ph: [phone]  [email or web address]

## 2022-01-20 NOTE — LETTER
1/20/2022        RE: Efrem Ramos  [address] [facility] [place] [address]        [facility]    PRIMARY CARE PROVIDER AND CLINIC:  Danny Paige MD, MD, [address] / [place] [address]  Chief Complaint   Patient presents with     Hospital F/U      [place] Medical Record Number:  [id number]  Place of Service where encounter took place:  [facility] (TCU) [25]    Efrem Ramos  is a 76 year old  (1945), admitted to the above facility from  [facility]. Hospital stay 1/10/22 through 1/19/22..   HPI:    PMH severe AS, atrial fib/flutter, HTN, CAD s/p PCI, pulmonary fibrosis, dysphagia who presents to ED with productive cough and SOB.   CAP with moraxella catarrhalis: leukocytosis WBC 21.7, CT on admission shows progressed emphysema, fibrotic changes with bronchiectasis and consolidation, cannot r/o infection, procalditonin < 0.05 patient afebrile without hyoxia, patient initially monitored of Abx, sputum culture crew moraxella catarrhalis, Tx with ceftriaxone while hospitalized transitioned to levaquin   Failure to thrive, cognitive impairement: son reports patient declining functionally past several weeks, wife passed recently could be depression/grief, TCU recommended  Dysphagia: ST nevarez 1/12/22 video swallow study done, found to have moderate to severe oropharyngeal dysphagia with silent aspiration of thin liquids, recommend pureed diet with moderately thick liquids and repeat video swallow in one week.   Hospital note as follows:   ADEM (Acute disseminated encephalomyelitis)   Diagnosed back in the 90s. He was apparently hospitalized for 2 months prior to reaching the diagnosis. He has had residual right sided weakness and dysphagia as a result. No records of this in Care Everywhere and does not regularly follow with a neurologist. His son was wondering if this is contributing to his present issues (I.e is this recurring or getting worse).  "Discussed that I am not familiar with this diagnosis. Neurology consulted and do not think this is contributing. They did obtain Brain and spine MRI. No enhancing lesions to indicate acute demyelinating inflammatory disease.  On exam today: patient is alert, resting in bed, denies fever, chills, SOB, states he gets a little HIGH, has a cough that is improving with productive sputum that is \"mostly clear\" states he feels he is getting stronger, denies CP, palpitations, N/V/D or constipation.     CODE STATUS/ADVANCE DIRECTIVES DISCUSSION:  Full Code  CPR/Full code   ALLERGIES:   Allergies   Allergen Reactions     Sulfa Drugs Difficulty breathing, Swelling and Hives     Adhesive Tape Blisters     Amiodarone Other (See Comments)     Developed pleural effusion     Penicillins Other (See Comments)     Reaction occurred as a child  Other reaction(s): Hives      PAST MEDICAL HISTORY:   Past Medical History:   Diagnosis Date     Atrial fibrillation (H)     amiodarone therapy discontinued due to pulmonary toxicity     Atrial flutter (H)      Benign essential hypertension 11/20/2018     Cancer (H) vocal cord     Carpal tunnel syndrome     abstracted 7/3/02.     Coronary artery disease involving native coronary artery of native heart without angina pectoris 5/8/2020    Cath 5/28/2020: patent stents; Cath 5/18/2020: ISABEL x4 to RCA; Cath 3/2020: 1 vessel disease; Cath 2017: moderate 2 vessel disease     CVA (cerebral infarction) 5/5/2015     Demyelinating disease of central nervous system, unspecified (H)     abstracted 7/3/02.     Dyspnea      ENCEPHALOPATHY UNSPECIFIED  3/15/2005    acute diseminated encephalitis     Femoral artery hematoma complicating cardiac catheterization     5/18/2020     History of thrombophlebitis      Mitral valve problem 8/18/2013    TRANSTHORACIC ECHOCARDIOGRAM 08/2013 There is a linear strand like projection seen in the LV cavity in diastole that I suspect is the posterior mitral leaflet although I " cannot exclude a torn chordae or small vegetation       Mixed hyperlipidemia 3/15/2005     Nonrheumatic mitral valve stenosis      MEL (obstructive sleep apnea)      Other and unspecified noninfectious gastroenteritis and colitis(558.9)     abstracted 7/3/02.     Pneumonia 8/17/2016     PVD (peripheral vascular disease) (H)      Redundant colon     needs CT colonography     Shingles      SKIN DISORDERS NEC 3/15/2005     Sleep apnea      Sleep apnea      SVT (supraventricular tachycardia) (H)       PAST SURGICAL HISTORY:   has a past surgical history that includes NONSPECIFIC PROCEDURE (as a child); NONSPECIFIC PROCEDURE (early); Head and neck surgery; biopsy (brain 2002); Thoracoscopic wedge resection lung (Right, 8/2/2016); Bone marrow biopsy, bone specimen, needle/trocar (N/A, 6/8/2017); orthopedic surgery; Esophagoscopy, gastroscopy, duodenoscopy (EGD), combined (N/A, 9/8/2018); cardiac catherization (09/05/2017); Coronary Angiogram (N/A, 3/23/2020); Instantaneous Wave-Free Ratio (N/A, 3/23/2020); Right Heart Cath (N/A, 5/28/2020); Heart Catheterization with Possible Intervention (N/A, 5/28/2020); Heart Catheterization with Possible Intervention (N/A, 5/18/2020); Temporary Pacemaker Insertion (N/A, 5/18/2020); Percutaneous Coronary Intervention Stent Drug Eluting (N/A, 5/18/2020); and Percutaneous Coronary Intervention Atherectomy Orbital (N/A, 5/18/2020).  FAMILY HISTORY: family history includes Blood Disease in his mother; Cancer - colorectal in his maternal grandfather; Cardiovascular in his father; Diabetes (age of onset: 5) in his sister; Hypertension in his brother; Respiratory in an other family member.  SOCIAL HISTORY:   reports that he quit smoking about 8 years ago. His smoking use included cigarettes. He has a 30.00 pack-year smoking history. He has never used smokeless tobacco. He reports previous alcohol use of about 42.0 standard drinks of alcohol per week. He reports that he does not use  drugs.  Patient's living condition: lives alone    Post Discharge Medication Reconciliation Status: discharge medications reconciled, continue medications without change  Current Outpatient Medications   Medication Sig     acetaminophen (TYLENOL) 325 MG tablet Take 325-650 mg by mouth every 6 hours as needed for mild pain      apixaban ANTICOAGULANT (ELIQUIS) 5 MG tablet Take 1 tablet (5 mg) by mouth 2 times daily     atorvastatin (LIPITOR) 40 MG tablet Take 1 tablet (40 mg) by mouth daily     budesonide (ENTOCORT EC) 3 MG EC capsule 9 mg daily x 6 weeks, then 6 mg daily x 2 weeks, then 3 mg daily x 2 weeks     calcium carbonate 600 mg-vitamin D 400 units (CALTRATE) 600-400 MG-UNIT per tablet Take 1 tablet by mouth 2 times daily      clopidogrel (PLAVIX) 75 MG tablet Take 1 tablet (75 mg) by mouth daily     digoxin (LANOXIN) 125 MCG tablet Take 1 tablet (125 mcg) by mouth daily     furosemide (LASIX) 20 MG tablet Take 1 tablet (20 mg) by mouth daily AND 0.5 tablets (10 mg) daily (with lunch).     gabapentin (NEURONTIN) 300 MG capsule TAKE 2 CAPSULES BY MOUTH EVERY EVENING     levofloxacin (LEVAQUIN) 500 MG tablet Take 1 tablet (500 mg) by mouth daily     melatonin 1 MG TABS tablet Take 1 mg by mouth At Bedtime ALSO TAKING 1 MG PO HS PRN FOR INSOMNIA     mirtazapine (REMERON) 15 MG tablet Take 1 tablet (15 mg) by mouth At Bedtime     multivitamin (OCUVITE) TABS Take 1 tablet by mouth daily      potassium chloride ER (K-TAB/KLOR-CON) 10 MEQ CR tablet Take 1 tablet (10 mEq) by mouth daily     senna-docusate (SENOKOT-S/PERICOLACE) 8.6-50 MG tablet Take 2 tablets by mouth 2 times daily (Patient taking differently: Take 2 tablets by mouth 2 times daily as needed for constipation )     metoprolol succinate ER (TOPROL XL) 25 MG 24 hr tablet Take 1 tablet (25 mg) by mouth daily (Patient not taking: Reported on 1/20/2022)     No current facility-administered medications for this visit.       ROS:  10 point ROS of systems  "including Constitutional, Eyes, Respiratory, Cardiovascular, Gastroenterology, Genitourinary, Integumentary, Musculoskeletal, Psychiatric were all negative except for pertinent positives noted in my HPI.    Vitals:  /70   Pulse 80   Temp 97.6  F (36.4  C)   Resp 16   Ht 1.727 m (5' 8\")   Wt 62.7 kg (138 lb 3.2 oz)   SpO2 93%   BMI 21.01 kg/m    Exam:  GENERAL APPEARANCE:  Alert, in no distress  ENT:  Mouth and posterior oropharynx normal, moist mucous membranes, [place]  EYES:  EOM, conjunctivae, lids, pupils and irises normal, PERRL  RESP:  respiratory effort and palpation of chest normal, lungs clear to auscultation , no respiratory distress  CV:  Palpation and auscultation of heart done , regular rate and rhythm, no murmur, rub, or gallop, no edema  ABDOMEN:  normal bowel sounds, soft, nontender, no hepatosplenomegaly or other masses  M/S:   patient resting in bed, able to move all 4 extremities  SKIN:  Inspection of skin and subcutaneous tissue baseline  NEURO:   speech wnl  PSYCH:  affect and mood normal    Lab/Diagnostic data:  Recent labs in [facility] reviewed by me today.  and   Most Recent 3 CBC's:Recent Labs   Lab Test 01/18/22  1048 01/15/22  0635 01/13/22  0623   WBC 10.7 11.0 12.7*   HGB 10.3* 10.2* 10.2*    100 100    313 306     Most Recent 3 BMP's:Recent Labs   Lab Test 01/18/22  1048 01/17/22  0708 01/15/22  0635    138 141   POTASSIUM 3.6 3.5 3.5   CHLORIDE 108 108 109   CO2 26 25 29   BUN 12 11 14   CR 0.74 0.79 0.79   ANIONGAP 4 5 3   ULISSES 8.3* 8.0* 8.1*   GLC 85 81 83       ASSESSMENT/PLAN:    (J15.6) Pneumonia due to Moraxella catarrhalis (H)  (primary encounter diagnosis)  (J84.112) IPF (idiopathic pulmonary fibrosis) (H)  Comment: acute/ongoing  Plan: monitor SaO2 at rest and with activity, levaquin 500mg QD, budesonide taper    (R13.10) dysphagia, oropharyngeal  Video swallow study 1/12/22  Acute/ongoing  Plan: pureed diet with moderately thick liquids, ST evaluation " and treatment  Repeat video swallow in 2 weeks.     (R62.7) Failure to thrive in adult  Comment: acute/ongoing  Plan: dietician to consult, ST eval and Tx of dyphagia    (R53.81) Physical deconditioning  Comment: acute/ongoing  Plan: PT and OT for strengthening    (I25.10) Coronary artery disease involving native coronary artery of native heart without angina pectoris  (I48.20) Chronic a-fib (H)  (I10) Benign essential hypertension  (I35.0) Nonrheumatic aortic (valve) stenosis  Comment: ongoing  Plan: vitals daily and prn, BMP follow, continue digoxin 125mcg QD, lasix 20mg QD, KCL 10meq QD, metoprolol xl 25mg QD, eliquis 5mg BID, plavix 75mg QD    (Z86.73) History of CVA (cerebrovascular accident)  Comment: ongoing  Plan: continue plavix 75mg QD, lipitor 40mg QD,     Orders:  ST eval and treat  CBC and BMP on Monday      Total time spent with patient visit at the skilled nursing facility was 35 min including patient visit and review of past records. Greater than 50% of total time spent with counseling and coordinating care due to discussed patient hospitalization and medications, lab monitoring, discussed dysphagia, will have ST eval, f/u video swallow, discussed patient with nurse management, will order ST for eval and Tx during TCU stay. .     Electronically signed by:  Tonya Lynn Haase, APRN CNP                     Sincerely,        Tonya Lynn Haase, APRN CNP

## 2022-01-21 NOTE — PROGRESS NOTES
[facility]    Hematology/Oncology Established Patient Follow-up Note      Today's Date: 1/31/2022    Reason for Follow-up:  MGUS, IgG kappa type.     HISTORY OF PRESENT ILLNESS: Efrem Ramos is a 76 year old male who presents for follow-up of MGUS and vitamin B12 deficiency anemia.      --He was previously followed by Dr. Alan Solomon for MGUS in 2013 when his M-spike was 0.6 g/dL.  He then transferred his care to Dr. Shae Aldana around 2015.    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  For his vitamin B12 deficiency anemia, he received oral B12 2000 mcg once per week.  --1/10/2022: Hospitalized at [facility] for pneumonia. M-spike increased from 0.6 g/dL (2/16/2021) to 1.3 g/dL (1/12/2022). IgG increased from 1936 to 2276. Kappa free light chains 12.96; lambda FLC 3.45, ratio 3.76.    INTERIM HISTORY:  Alfredo reports fatigue, generalized weakness. [facility] and will be there for another 7-10 more days. He finished a course of levofloxacin for recent pneumonia.      REVIEW OF SYSTEMS:   14 point ROS was reviewed and is negative other than as noted above in HPI.       HOME MEDICATIONS:  Current Outpatient Medications   Medication Sig Dispense Refill     acetaminophen (TYLENOL) 325 MG tablet Take 325-650 mg by mouth every 6 hours as needed for mild pain        apixaban ANTICOAGULANT (ELIQUIS) 5 MG tablet Take 1 tablet (5 mg) by mouth 2 times daily 180 tablet 3     atorvastatin (LIPITOR) 40 MG tablet Take 1 tablet (40 mg) by mouth daily 90 tablet 3     budesonide (ENTOCORT EC) 3 MG EC capsule 9 mg daily x 6 weeks, then 6 mg daily x 2 weeks, then 3 mg daily x 2 weeks 126 capsule 0     calcium carbonate 600 mg-vitamin  D 400 units (CALTRATE) 600-400 MG-UNIT per tablet Take 1 tablet by mouth 2 times daily        clopidogrel (PLAVIX) 75 MG tablet Take 1 tablet (75 mg) by mouth daily 90 tablet 1     digoxin (LANOXIN) 125 MCG tablet Take 1 tablet (125 mcg) by mouth daily 90 tablet 3     furosemide (LASIX) 20 MG tablet Take 1 tablet (20 mg) by mouth daily AND 0.5 tablets (10 mg) daily (with lunch). 135 tablet 3     gabapentin (NEURONTIN) 300 MG capsule TAKE 2 CAPSULES BY MOUTH EVERY EVENING 180 capsule 3     levofloxacin (LEVAQUIN) 500 MG tablet Take 1 tablet (500 mg) by mouth daily 3 tablet 0     melatonin 1 MG TABS tablet Take 1 mg by mouth At Bedtime ALSO TAKING 1 MG PO HS PRN FOR INSOMNIA       metoprolol succinate ER (TOPROL XL) 25 MG 24 hr tablet Take 1 tablet (25 mg) by mouth daily (Patient not taking: Reported on 1/20/2022) 90 tablet 3     mirtazapine (REMERON) 15 MG tablet Take 1 tablet (15 mg) by mouth At Bedtime 90 tablet 3     multivitamin (OCUVITE) TABS Take 1 tablet by mouth daily        potassium chloride ER (K-TAB/KLOR-CON) 10 MEQ CR tablet Take 1 tablet (10 mEq) by mouth daily 90 tablet 3     senna-docusate (SENOKOT-S/PERICOLACE) 8.6-50 MG tablet Take 2 tablets by mouth 2 times daily (Patient taking differently: Take 2 tablets by mouth 2 times daily as needed for constipation ) 120 tablet 3         ALLERGIES:  Allergies   Allergen Reactions     Sulfa Drugs Difficulty breathing, Swelling and Hives     Adhesive Tape Blisters     Amiodarone Other (See Comments)     Developed pleural effusion     Penicillins Other (See Comments)     Reaction occurred as a child  Other reaction(s): Hives         PAST MEDICAL HISTORY:  Past Medical History:   Diagnosis Date     Atrial fibrillation (H)     amiodarone therapy discontinued due to pulmonary toxicity     Atrial flutter (H)      Benign essential hypertension 11/20/2018     Cancer (H) vocal cord     Carpal tunnel syndrome     abstracted 7/3/02.     Coronary artery disease involving  native coronary artery of native heart without angina pectoris 5/8/2020    Cath 5/28/2020: patent stents; Cath 5/18/2020: ISABEL x4 to RCA; Cath 3/2020: 1 vessel disease; Cath 2017: moderate 2 vessel disease     CVA (cerebral infarction) 5/5/2015     Demyelinating disease of central nervous system, unspecified (H)     abstracted 7/3/02.     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     Pneumonia 8/17/2016     PVD (peripheral vascular disease) (H)      Redundant colon     needs CT colonography     Shingles      SKIN DISORDERS NEC 3/15/2005     Sleep apnea      Sleep apnea      SVT (supraventricular tachycardia) (H)          PAST SURGICAL HISTORY:  Past Surgical History:   Procedure Laterality Date     BIOPSY  brain 2002     BONE MARROW BIOPSY, BONE SPECIMEN, NEEDLE/TROCAR N/A 6/8/2017    Procedure: BIOPSY BONE MARROW;  UNILATERAL BONE MARROW BIOPSY (CONSCIOUS SEDATION) ;  Surgeon: Jamie Gonzales MD;  Location:  GI     CARDIAC CATHERIZATION  09/05/2017    2V CAD, IFR of RCA 0.95     CV CORONARY ANGIOGRAM N/A 3/23/2020    Procedure: Coronary Angiogram and right heart cath;  Surgeon: Gino Ferrer MD;  Location:  HEART CARDIAC CATH LAB     CV HEART CATHETERIZATION WITH POSSIBLE INTERVENTION N/A 5/28/2020    Procedure: Heart Catheterization with Possible Intervention;  Surgeon: Larry Chawla,  MD;  Location:  HEART CARDIAC CATH LAB     CV HEART CATHETERIZATION WITH POSSIBLE INTERVENTION N/A 5/18/2020    Procedure: Heart Catheterization with Possible Intervention;  Surgeon: Gaurang Swenson MD;  Location:  HEART CARDIAC CATH LAB     CV INSTANTANEOUS WAVE-FREE RATIO N/A 3/23/2020    Procedure: Instantaneous Wave-Free Ratio;  Surgeon: Gino Ferrer MD;  Location:  HEART CARDIAC CATH LAB     CV PCI ATHERECTOMY ORBITAL N/A 5/18/2020    Procedure: Percutaneous Coronary Intervention Atherectomy Rotational;  Surgeon: Gaurang Swenson MD;  Location:  HEART CARDIAC CATH LAB     CV PCI STENT DRUG ELUTING N/A 5/18/2020    Procedure: Percutaneous Coronary Intervention Stent Drug Eluting;  Surgeon: Gaurang Swenson MD;  Location:  HEART CARDIAC CATH LAB     CV RIGHT HEART CATH MEASUREMENTS RECORDED N/A 5/28/2020    Procedure: Right Heart Cath;  Surgeon: Larry Chawla MD;  Location:  HEART CARDIAC CATH LAB     CV TEMPORARY PACEMAKER INSERTION N/A 5/18/2020    Procedure: Temporary Pacemaker Insertion;  Surgeon: Gaurang Swenson MD;  Location:  HEART CARDIAC CATH LAB     ESOPHAGOSCOPY, GASTROSCOPY, DUODENOSCOPY (EGD), COMBINED N/A 9/8/2018    Procedure: COMBINED ESOPHAGOSCOPY, GASTROSCOPY, DUODENOSCOPY (EGD), BIOPSY SINGLE OR MULTIPLE;;  Surgeon: Xander Marie MD;  Location:  GI     HEAD & NECK SURGERY       ORTHOPEDIC SURGERY      right arm ulna reset after injury     THORACOSCOPIC WEDGE RESECTION LUNG Right 8/2/2016    Procedure: THORACOSCOPIC WEDGE RESECTION LUNG;  Surgeon: Abdelrahman Noriega MD;  Location:  OR     [facility] NONSPECIFIC PROCEDURE  as a child    T & A. abstracted 7/3/02.     [facility] NONSPECIFIC PROCEDURE  early    CTR. abstracted 7/3/02.         SOCIAL HISTORY:  Social History     Socioeconomic History     Marital status:      Spouse name: Not on file     Number of children: Not on file     Years of education: Not on file      Highest education level: Not on file   Occupational History     Not on file   Tobacco Use     Smoking status: Former Smoker     Packs/day: 1.00     Years: 30.00     Pack years: 30.00     Types: Cigarettes     Quit date: 2013     Years since quittin.4     Smokeless tobacco: Never Used   Substance and Sexual Activity     Alcohol use: Not Currently     Alcohol/week: 42.0 standard drinks     Types: 42 Standard drinks or equivalent per week     Drug use: No     Sexual activity: Not Currently   Other Topics Concern     Parent/sibling w/ CABG, MI or angioplasty before 65F 55M? Not Asked      Service Not Asked     Blood Transfusions Not Asked     Caffeine Concern Yes     Comment: 1 Coke occasionally      Occupational Exposure Not Asked     Hobby Hazards Not Asked     Sleep Concern Not Asked     Stress Concern Not Asked     Weight Concern Not Asked     Special Diet No     Back Care Not Asked     Exercise No     Bike Helmet Not Asked     Seat Belt Not Asked     Self-Exams Not Asked   Social History Narrative    Retired (disability)      Social Determinants of Health     Financial Resource Strain: Not on file   Food Insecurity: Not on file   Transportation Needs: Not on file   Physical Activity: Not on file   Stress: Not on file   Social Connections: Not on file   Intimate Partner Violence: Not on file   Housing Stability: Not on file         FAMILY HISTORY:  Family History   Problem Relation Age of Onset     Blood Disease Mother         Anemia     Cardiovascular Father      Cancer - colorectal Maternal Grandfather      Hypertension Brother      Diabetes Sister 5        Juvinile Diabetes passed at 36     Respiratory Other         Lung Cancer         PHYSICAL EXAM:  Vital signs:  There were no vitals taken for this visit.   GENERAL/CONSTITUTIONAL: No acute distress.  EYES: No scleral icterus.  ENT/MOUTH: Neck supple. Mask in place.  Remaining exam deferred for detailed discussion.    LABS:  CBC  RESULTS: Recent Labs   Lab Test 01/18/22  1048   WBC 10.7   RBC 3.23*   HGB 10.3*   HCT 32.3*      MCH 31.9   MCHC 31.9   RDW 14.6          Last Comprehensive Metabolic Panel:  Sodium   Date Value Ref Range Status   01/18/2022 138 133 - 144 mmol/L Final   06/22/2021 140 133 - 144 mmol/L Final     Potassium   Date Value Ref Range Status   01/18/2022 3.6 3.4 - 5.3 mmol/L Final   06/22/2021 4.0 3.4 - 5.3 mmol/L Final     Chloride   Date Value Ref Range Status   01/18/2022 108 94 - 109 mmol/L Final   06/22/2021 109 94 - 109 mmol/L Final     Carbon Dioxide   Date Value Ref Range Status   06/22/2021 28 20 - 32 mmol/L Final     Carbon Dioxide (CO2)   Date Value Ref Range Status   01/18/2022 26 20 - 32 mmol/L Final     Anion Gap   Date Value Ref Range Status   01/18/2022 4 3 - 14 mmol/L Final   06/22/2021 3 3 - 14 mmol/L Final     Glucose   Date Value Ref Range Status   01/18/2022 85 70 - 99 mg/dL Final   06/22/2021 85 70 - 99 mg/dL Final     Urea Nitrogen   Date Value Ref Range Status   01/18/2022 12 7 - 30 mg/dL Final   06/22/2021 13 7 - 30 mg/dL Final     Creatinine   Date Value Ref Range Status   01/18/2022 0.74 0.66 - 1.25 mg/dL Final   06/22/2021 0.97 0.66 - 1.25 mg/dL Final     GFR Estimate   Date Value Ref Range Status   01/18/2022 >90 >60 mL/min/1.73m2 Final     Comment:     Effective December 21, 2021 eGFRcr in adults is calculated using the 2021 CKD-EPI creatinine equation which includes age and gender (Ab et al., NEJ, DOI: 10.1056/BCOOre6655107)   06/22/2021 74 >60 mL/min/[1.73_m2] Final     Comment:     Non  GFR Calc  Starting 12/18/2018, serum creatinine based estimated GFR (eGFR) will be   calculated using the Chronic Kidney Disease Epidemiology Collaboration   (CKD-EPI) equation.       Calcium   Date Value Ref Range Status   01/18/2022 8.3 (L) 8.5 - 10.1 mg/dL Final   06/22/2021 8.7 8.5 - 10.1 mg/dL Final     Bilirubin Total   Date Value Ref Range Status   10/26/2021 0.7 0.2  - 1.3 mg/dL Final   2021 0.6 0.2 - 1.3 mg/dL Final     Alkaline Phosphatase   Date Value Ref Range Status   10/26/2021 118 40 - 150 U/L Final   2021 115 40 - 150 U/L Final     ALT   Date Value Ref Range Status   10/26/2021 25 0 - 70 U/L Final   2021 52 0 - 70 U/L Final     AST   Date Value Ref Range Status   10/26/2021 21 0 - 45 U/L Final   2021 37 0 - 45 U/L Final       Vitamin B12 = 767    SPEP - M-spike:  2017: 0.4  2017: 0.4  3/21/2018: 0.4  2018: 0.5  2018: 0.5  2019: 0.7  2020: 0.8  2021: 0.6  2022: 1.3    2022:  IgG = 1936 -> 2276    PATHOLOGY:  None new since prior visit.    IMAGIN2022 Chest CT:  1.  Progressed emphysema.   2.  Progressed fibrotic changes with bronchiectasis and increased interstitial consolidation consistent with underlying usual interstitial pneumonitis, correlate to exclude superimposed infection.  3.  Nonspecific, increasing mediastinal lymphadenopathy, correlate to exclude underlying malignancy or lymphoproliferative cause. No discrete pulmonary mass.  4.  Mildly enlarged heart with coronary artery disease and atherosclerotic vascular disease.    2022 Brain MRI:  1. No acute intracranial process.  2. Multiple small chronic infarcts.  3. Brain atrophy and chronic-appearing white matter disease, which may  reflect a combination of chronic small vessel ischemic changes and  demyelinating disease.  4. No intracranial hemorrhage, extra-axial fluid collection, mass  lesion or herniation.     2022 MR cervical spine:  1.  No abnormal signal or abnormal enhancement cervical spinal cord.  2.  Prominent degenerative changes leading to canal compromise or neural foraminal narrowing at multiple levels as described above.    2022 MR thoracic spine:  1.  Acute to subacute moderate to severe compression fracture T11 vertebral body with no evidence of canal compromise.  2.  Acute on chronic severe compression  fracture T12 vertebral body with no evidence of canal compromise.  3.  Stable severe chronic L1 compression fracture with cement material within.  4.  No abnormal signal or abnormal enhancement of thoracic spinal cord.  5.  No significant canal compromise or significant neural foraminal narrowing throughout thoracic spine.    ASSESSMENT/PLAN:  Efrem Ramos is a 76 year old male with the following issues:  1. Monoclonal gammopathy of undetermined significance, IgG kappa  -I reviewed the 1/12/2022 labs with Alfredo.  His M-spike has increased from 0.6 to 1.3 g/dL.  He has no evidence of  hypercalcemia, renal failure, or other end organ disease at the present time. He has anemia but this may be related to his acute illness/pneumonia while in-hospital and has improved since hospital discharge to 11.7 g/dL  -I discussed that he has at least 1-2% chance per year of developing multiple myeloma or other lymphoproliferative disorder.  --I recommended repeat bone survey and bone marrow biopsy due to his increase in monoclonal peak. He agrees to proceed.  --Discussed consideration for therapy if his bone marrow biopsy were to confirm multiple myeloma.    2. History of vitamin B12 deficiency  -1/14/2022 Vitamin B12 level was elevated at 1028.  -He discontinued vitamin B12 supplement.    3. Pulmonary fibrosis, history of pulmonary nodule  -Follows with Dr. Abad.  -1/11/2022 Chest CT scan showed progressed emphysema, fibrotic changes, increased interstitial consolidation with underlying usual interstitial pneumonitis.  He also has bronchiectasis, severe CAD. The scan also showed nonspecific increasing mediastinal lymphadenopathy but no discrete pulmonary mass.  This may be potentially reactive given his pneumonia.    4. Moderate aortic stenosis  -Seen by Dr. Cortes previously in 8/2019.  -Intermediate to high risk surgical candidate.  Not felt to need TAVR at present time due to relative asymptomatic status.    5. Lumbar spine  pain with history of L1 compression fracture  -His mid back pain is chronic since at least 2018 when he underwent vertebroplasty for his L1 compression fracture but subsequently suffered a fall and reinjuring his back.    --He currently has weakness and undergoing rehab at [facility].    Cherelle Hurley MD  Hematology/Oncology  [facility]    Total time spent: 40 minutes in patient evaluation, counseling, documentation, and coordination of care.

## 2022-01-24 NOTE — LETTER
"    1/24/2022        RE: Efrem Ramos  [address] [address]        [facility] GER[facility]ATR[facility]    Chief Complaint   Patient presents with     Nursing Home Acute     HPI:  Efrem Ramos is a 76 year old  (1945), who is being seen today for an episodic care visit at: [facility]) [25]. Today's concern is:   Pneumonia/IPF: patient sitting up in w/c in room, denies cough, congestion, SOB, SaO2 95% on room air, patient walking up to 150 feet using a RW with SBA  Failure to thrive/deconditioning: patient working with therapy, walking as above BIMS 15/15  CAD/AF/HTN: BP 97/63, 92/57, 91/64, with HR 70-80 range, denies CP, palpitations, patient states he feels a little dizzy with standing. Weight is down from admission to 129.3lbs, admission weight was 138.2lbs  Hx of CVA/dysphagia: ST eval, patient continues on thickened liquids and pureed diet    Allergies, and PMH/PSH reviewed in [facility] today.  REVIEW OF SYSTEMS:  10 point ROS of systems including Constitutional, Eyes, Respiratory, Cardiovascular, Gastroenterology, Genitourinary, Integumentary, Musculoskeletal, Psychiatric were all negative except for pertinent positives noted in my HPI.    Objective:   BP 92/57   Pulse 79   Temp 98.3  F (36.8  C)   Resp 16   Ht 1.727 m (5' 8\")   Wt 58.7 kg (129 lb 4.8 oz)   SpO2 98%   BMI 19.66 kg/m    GENERAL APPEARANCE:  Alert, in no distress  ENT:  Mouth and posterior oropharynx normal, moist mucous membranes, [place]  EYES:  EOM, conjunctivae, lids, pupils and irises normal, PERRL  RESP:  respiratory effort and palpation of chest normal, lungs clear to auscultation , no respiratory distress, diminished breath sounds bases bilaterall  CV:  Palpation and auscultation of heart done , regular rate and rhythm, no murmur, rub, or gallop, no edema  ABDOMEN:  normal bowel sounds, soft, nontender, no hepatosplenomegaly or other masses  M/S:   patient sitting up in w/c, able to move all 4 " extremities  SKIN:  Inspection of skin and subcutaneous tissue baseline  NEURO:   speech wnl    Recent labs in EPIC reviewed by me today.  and   Most Recent 3 CBC's:  Recent Labs   Lab Test 01/18/22  1048 01/15/22  0635 01/13/22  0623   WBC 10.7 11.0 12.7*   HGB 10.3* 10.2* 10.2*    100 100    313 306     Most Recent 3 BMP's:  Recent Labs   Lab Test 01/18/22  1048 01/17/22  0708 01/15/22  0635    138 141   POTASSIUM 3.6 3.5 3.5   CHLORIDE 108 108 109   CO2 26 25 29   BUN 12 11 14   CR 0.74 0.79 0.79   ANIONGAP 4 5 3   ULISSES 8.3* 8.0* 8.1*   GLC 85 81 83       Assessment/Plan:  (J15.6) Pneumonia due to Moraxella catarrhalis (H)  (primary encounter diagnosis)  (J84.112) IPF (idiopathic pulmonary fibrosis) (H)  Comment: acute/ongoing  Plan: monitor SaO2 at rest and with activity, levaquin finished,  budesonide taper       (R13.10) dysphagia, oropharyngeal  Video swallow study 1/12/22  Acute/ongoing  Plan: pureed diet with moderately thick liquids, ST evaluation and treatment  Repeat video swallow in 2 weeks.      (R62.7) Failure to thrive in adult  Comment: acute/ongoing  Plan: dietician to consult, ST eval and Tx of dyphagia     (R53.81) Physical deconditioning  Comment: acute/ongoing  Plan: PT and OT for strengthening     (I25.10) Coronary artery disease involving native coronary artery of native heart without angina pectoris  (I48.20) Chronic a-fib (H)  (I10) Benign essential hypertension  (I35.0) Nonrheumatic aortic (valve) stenosis  Comment: ongoing  Plan: vitals daily and prn, BMP follow, continue digoxin 125mcg QD, lasix 20mg QD, KCL 10meq QD, metoprolol xl 25mg QD, eliquis 5mg BID, plavix 75mg QD     (Z86.73) History of CVA (cerebrovascular accident)  Comment: ongoing  Plan: continue plavix 75mg QD, lipitor 40mg QD,        Orders:  Decrease metoprolol to 12.5mg Qpm  change lasix to 10mg QD  DC KCL      Electronically signed by: Tonya Lynn Haase, APRN CNP           Sincerely,        Nafisa Marin  Haase, APRN CNP

## 2022-01-27 NOTE — PROGRESS NOTES
"[facility] GERIATRICS    Chief Complaint   Patient presents with     Nursing Home Acute     HPI:  Efrem Ramos is a 76 year old  (1945), who is being seen today for an episodic care visit at: [facility]) [25]. Today's concern is:   Pneumonia/IPF: patient sitting up in w/c in room, denies cough, congestion, SOB, SaO2 95% on room air, patient walking up to 150 feet using a RW with SBA, SBA with ADL's, patient states he does have some HIGH  Leukocytosis: WBC 11.9  Failure to thrive/deconditioning: patient working with therapy, walking as above BIMS 15/15, SBT 23/30 indicating mild impairement  CAD/AF/CHF/HTN: BP 91/59, 103/70, 108/70, with HR 80-90 range, denies CP, palpitations, patient states he feels a little dizzy with standing. Weight stable, current weight  132.6lbs, admission weight was 138.2lbs  Hx of CVA/dysphagia: ST eval, patient continues on thickened liquids and pureed diet    Allergies, and PMH/PSH reviewed in [facility] today.  REVIEW OF SYSTEMS:  10 point ROS of systems including Constitutional, Eyes, Respiratory, Cardiovascular, Gastroenterology, Genitourinary, Integumentary, Musculoskeletal, Psychiatric were all negative except for pertinent positives noted in my HPI.    Objective:   BP 91/59   Pulse 88   Temp 97.9  F (36.6  C)   Resp 16   Ht 1.727 m (5' 8\")   Wt 60.1 kg (132 lb 9.6 oz)   SpO2 93%   BMI 20.16 kg/m    GENERAL APPEARANCE:  Alert, in no distress  ENT:  Mouth and posterior oropharynx normal, moist mucous membranes, [place]  EYES:  EOM, conjunctivae, lids, pupils and irises normal, PERRL  RESP:  respiratory effort and palpation of chest normal, lungs clear to auscultation , no respiratory distress  CV:  Palpation and auscultation of heart done , regular rate and rhythm, no murmur, rub, or gallop, no edema  ABDOMEN:  normal bowel sounds, soft, nontender, no hepatosplenomegaly or other masses  M/S:   patient sitting on the edge of bed at time of exam  SKIN:  Inspection " of skin and subcutaneous tissue baseline  NEURO:   speech wnl    Recent labs in [facility] reviewed by me today.  and   Most Recent 3 CBC's:Recent Labs   Lab Test 01/18/22  1048 01/15/22  0635 01/13/22  0623   WBC 10.7 11.0 12.7*   HGB 10.3* 10.2* 10.2*    100 100    313 306     Most Recent 3 BMP's:Recent Labs   Lab Test 01/18/22  1048 01/17/22  0708 01/15/22  0635    138 141   POTASSIUM 3.6 3.5 3.5   CHLORIDE 108 108 109   CO2 26 25 29   BUN 12 11 14   CR 0.74 0.79 0.79   ANIONGAP 4 5 3   ULISSES 8.3* 8.0* 8.1*   GLC 85 81 83       Assessment/Plan:  (J15.6) Pneumonia due to Moraxella catarrhalis (H)  (primary encounter diagnosis)  (J84.112) IPF (idiopathic pulmonary fibrosis) (H)  Comment: acute/ongoing  Plan: monitor SaO2 at rest and with activity, levaquin finished,  budesonide taper  Repeat CXR due to ongoing leukocytosis  WBC 11.9 1/26/22  IS q 2 hours while awake, discussed at length with patient, also discussed patient function and leukocytosis in IDT at length     (R13.10) dysphagia, oropharyngeal  Video swallow study 1/12/22  Acute/ongoing  Plan: pureed diet with moderately thick liquids, ST evaluation and treatment  Repeat video swallow in 2 weeks.      (R62.7) Failure to thrive in adult  Comment: acute/ongoing  Plan: dietician to consult, ST eval and Tx of dyphagia     (R53.81) Physical deconditioning  Comment: acute/ongoing  Plan: PT and OT for strengthening     (I25.10) Coronary artery disease involving native coronary artery of native heart without angina pectoris  (I48.20) Chronic a-fib (H)  (I10) Benign essential hypertension  (I35.0) Nonrheumatic aortic (valve) stenosis  Comment: ongoing  Plan: vitals daily and prn, BMP follow, continue digoxin 125mcg QD, lasix 20mg QD, KCL 10meq QD, metoprolol xl 25mg QD, eliquis 5mg BID, plavix 75mg QD     (Z86.73) History of CVA (cerebrovascular accident)  Comment: ongoing  Plan: continue plavix 75mg QD, lipitor 40mg QD,     Orders:  2view CXR today  IS  q 2 hours while awake  CBC again on Friday    Electronically signed by: Tonya Lynn Haase, APRN CNP

## 2022-01-27 NOTE — LETTER
"    1/27/2022        RE: Efrem Ramos  [address] [address]        [facility] GERIATRICS    Chief Complaint   Patient presents with     Nursing Home Acute     HPI:  Efrme Ramos is a 76 year old  (1945), who is being seen today for an episodic care visit at: [facility]) [25]. Today's concern is:   Pneumonia/IPF: patient sitting up in w/c in room, denies cough, congestion, SOB, SaO2 95% on room air, patient walking up to 150 feet using a RW with SBA, SBA with ADL's, patient states he does have some HIGH  Leukocytosis: WBC 11.9  Failure to thrive/deconditioning: patient working with therapy, walking as above BIMS 15/15, SBT 23/30 indicating mild impairement  CAD/AF/CHF/HTN: BP 91/59, 103/70, 108/70, with HR 80-90 range, denies CP, palpitations, patient states he feels a little dizzy with standing. Weight stable, current weight  132.6lbs, admission weight was 138.2lbs  Hx of CVA/dysphagia: ST eval, patient continues on thickened liquids and pureed diet    Allergies, and PMH/PSH reviewed in [facility] today.  REVIEW OF SYSTEMS:  10 point ROS of systems including Constitutional, Eyes, Respiratory, Cardiovascular, Gastroenterology, Genitourinary, Integumentary, Musculoskeletal, Psychiatric were all negative except for pertinent positives noted in my HPI.    Objective:   BP 91/59   Pulse 88   Temp 97.9  F (36.6  C)   Resp 16   Ht 1.727 m (5' 8\")   Wt 60.1 kg (132 lb 9.6 oz)   SpO2 93%   BMI 20.16 kg/m    GENERAL APPEARANCE:  Alert, in no distress  ENT:  Mouth and posterior oropharynx normal, moist mucous membranes, [place]  EYES:  EOM, conjunctivae, lids, pupils and irises normal, PERRL  RESP:  respiratory effort and palpation of chest normal, lungs clear to auscultation , no respiratory distress  CV:  Palpation and auscultation of heart done , regular rate and rhythm, no murmur, rub, or gallop, no edema  ABDOMEN:  normal bowel sounds, soft, nontender, no hepatosplenomegaly or " other masses  M/S:   patient sitting on the edge of bed at time of exam  SKIN:  Inspection of skin and subcutaneous tissue baseline  NEURO:   speech wnl    Recent labs in [facility] reviewed by me today.  and   Most Recent 3 CBC's:Recent Labs   Lab Test 01/18/22  1048 01/15/22  0635 01/13/22  0623   WBC 10.7 11.0 12.7*   HGB 10.3* 10.2* 10.2*    100 100    313 306     Most Recent 3 BMP's:Recent Labs   Lab Test 01/18/22  1048 01/17/22  0708 01/15/22  0635    138 141   POTASSIUM 3.6 3.5 3.5   CHLORIDE 108 108 109   CO2 26 25 29   BUN 12 11 14   CR 0.74 0.79 0.79   ANIONGAP 4 5 3   ULISSES 8.3* 8.0* 8.1*   GLC 85 81 83       Assessment/Plan:  (J15.6) Pneumonia due to Moraxella catarrhalis (H)  (primary encounter diagnosis)  (J84.112) IPF (idiopathic pulmonary fibrosis) (H)  Comment: acute/ongoing  Plan: monitor SaO2 at rest and with activity, levaquin finished,  budesonide taper  Repeat CXR due to ongoing leukocytosis  WBC 11.9 1/26/22  IS q 2 hours while awake, discussed at length with patient, also discussed patient function and leukocytosis in IDT at length     (R13.10) dysphagia, oropharyngeal  Video swallow study 1/12/22  Acute/ongoing  Plan: pureed diet with moderately thick liquids, ST evaluation and treatment  Repeat video swallow in 2 weeks.      (R62.7) Failure to thrive in adult  Comment: acute/ongoing  Plan: dietician to consult, ST eval and Tx of dyphagia     (R53.81) Physical deconditioning  Comment: acute/ongoing  Plan: PT and OT for strengthening     (I25.10) Coronary artery disease involving native coronary artery of native heart without angina pectoris  (I48.20) Chronic a-fib (H)  (I10) Benign essential hypertension  (I35.0) Nonrheumatic aortic (valve) stenosis  Comment: ongoing  Plan: vitals daily and prn, BMP follow, continue digoxin 125mcg QD, lasix 20mg QD, KCL 10meq QD, metoprolol xl 25mg QD, eliquis 5mg BID, plavix 75mg QD     (Z86.73) History of CVA (cerebrovascular  accident)  Comment: ongoing  Plan: continue plavix 75mg QD, lipitor 40mg QD,     Orders:  2view CXR today  IS q 2 hours while awake  CBC again on Friday    Electronically signed by: Tonya Lynn Haase, APRN CNP           Sincerely,        Tonya Lynn Haase, APRN CNP

## 2022-01-31 NOTE — PATIENT INSTRUCTIONS
1. Arrange for bone marrow biopsy with conscious sedation.  2. Arrange for bone survey.  3. RTC MD 1-2 weeks after bone marrow biopsy.

## 2022-01-31 NOTE — LETTER
1/31/2022         RE: Efrem Ramos  [address] [address]        Dear Colleague,    Thank you for referring your patient, Efrem Ramos, to the [facility] [place]. Please see a copy of my visit note below.    [facility]    Hematology/Oncology Established Patient Follow-up Note      Today's Date: 1/31/2022    Reason for Follow-up:  MGUS, IgG kappa type.     HISTORY OF PRESENT ILLNESS: Efrem Ramos is a 76 year old male who presents for follow-up of MGUS and vitamin B12 deficiency anemia.      --He was previously followed by Dr. Alan Solomon for MGUS in 2013 when his M-spike was 0.6 g/dL.  He then transferred his care to Dr. Shae Aldana around 2015.    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  For his vitamin B12 deficiency anemia, he received oral B12 2000 mcg once per week.  --1/10/2022: Hospitalized at [facility] for pneumonia. M-spike increased from 0.6 g/dL (2/16/2021) to 1.3 g/dL (1/12/2022). IgG increased from 1936 to 2276. Kappa free light chains 12.96; lambda FLC 3.45, ratio 3.76.    INTERIM HISTORY:  Alfredo reports fatigue, generalized weakness. [facility] and will be there for another 7-10 more days. He finished a course of levofloxacin for recent pneumonia.      REVIEW OF SYSTEMS:   14 point ROS was reviewed and is negative other than as noted above in HPI.       HOME MEDICATIONS:  Current Outpatient Medications   Medication Sig Dispense Refill     acetaminophen (TYLENOL) 325 MG tablet Take 325-650 mg by mouth every 6 hours as needed for mild pain        apixaban ANTICOAGULANT (ELIQUIS) 5 MG tablet Take 1 tablet (5 mg) by mouth 2 times daily 180 tablet 3      atorvastatin (LIPITOR) 40 MG tablet Take 1 tablet (40 mg) by mouth daily 90 tablet 3     budesonide (ENTOCORT EC) 3 MG EC capsule 9 mg daily x 6 weeks, then 6 mg daily x 2 weeks, then 3 mg daily x 2 weeks 126 capsule 0     calcium carbonate 600 mg-vitamin D 400 units (CALTRATE) 600-400 MG-UNIT per tablet Take 1 tablet by mouth 2 times daily        clopidogrel (PLAVIX) 75 MG tablet Take 1 tablet (75 mg) by mouth daily 90 tablet 1     digoxin (LANOXIN) 125 MCG tablet Take 1 tablet (125 mcg) by mouth daily 90 tablet 3     furosemide (LASIX) 20 MG tablet Take 1 tablet (20 mg) by mouth daily AND 0.5 tablets (10 mg) daily (with lunch). 135 tablet 3     gabapentin (NEURONTIN) 300 MG capsule TAKE 2 CAPSULES BY MOUTH EVERY EVENING 180 capsule 3     levofloxacin (LEVAQUIN) 500 MG tablet Take 1 tablet (500 mg) by mouth daily 3 tablet 0     melatonin 1 MG TABS tablet Take 1 mg by mouth At Bedtime ALSO TAKING 1 MG PO HS PRN FOR INSOMNIA       metoprolol succinate ER (TOPROL XL) 25 MG 24 hr tablet Take 1 tablet (25 mg) by mouth daily (Patient not taking: Reported on 1/20/2022) 90 tablet 3     mirtazapine (REMERON) 15 MG tablet Take 1 tablet (15 mg) by mouth At Bedtime 90 tablet 3     multivitamin (OCUVITE) TABS Take 1 tablet by mouth daily        potassium chloride ER (K-TAB/KLOR-CON) 10 MEQ CR tablet Take 1 tablet (10 mEq) by mouth daily 90 tablet 3     senna-docusate (SENOKOT-S/PERICOLACE) 8.6-50 MG tablet Take 2 tablets by mouth 2 times daily (Patient taking differently: Take 2 tablets by mouth 2 times daily as needed for constipation ) 120 tablet 3         ALLERGIES:  Allergies   Allergen Reactions     Sulfa Drugs Difficulty breathing, Swelling and Hives     Adhesive Tape Blisters     Amiodarone Other (See Comments)     Developed pleural effusion     Penicillins Other (See Comments)     Reaction occurred as a child  Other reaction(s): Hives         PAST MEDICAL HISTORY:  Past Medical History:   Diagnosis Date     Atrial  fibrillation (H)     amiodarone therapy discontinued due to pulmonary toxicity     Atrial flutter (H)      Benign essential hypertension 11/20/2018     Cancer (H) vocal cord     Carpal tunnel syndrome     abstracted 7/3/02.     Coronary artery disease involving native coronary artery of native heart without angina pectoris 5/8/2020    Cath 5/28/2020: patent stents; Cath 5/18/2020: ISABEL x4 to RCA; Cath 3/2020: 1 vessel disease; Cath 2017: moderate 2 vessel disease     CVA (cerebral infarction) 5/5/2015     Demyelinating disease of central nervous system, unspecified (H)     abstracted 7/3/02.     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     Pneumonia 8/17/2016     PVD (peripheral vascular disease) (H)      Redundant colon     needs CT colonography     Shingles      SKIN DISORDERS NEC 3/15/2005     Sleep apnea      Sleep apnea      SVT (supraventricular tachycardia) (H)          PAST SURGICAL HISTORY:  Past Surgical History:   Procedure Laterality Date     BIOPSY  brain 2002     BONE MARROW BIOPSY, BONE SPECIMEN, NEEDLE/TROCAR N/A 6/8/2017    Procedure: BIOPSY BONE MARROW;  UNILATERAL BONE MARROW BIOPSY (CONSCIOUS SEDATION) ;  Surgeon: Jamie Gonzales MD;  Location:  GI     CARDIAC CATHERIZATION  09/05/2017    2V CAD, IFR of RCA 0.95     CV CORONARY ANGIOGRAM N/A 3/23/2020    Procedure: Coronary  Angiogram and right heart cath;  Surgeon: Gino Ferrer MD;  Location:  HEART CARDIAC CATH LAB     CV HEART CATHETERIZATION WITH POSSIBLE INTERVENTION N/A 5/28/2020    Procedure: Heart Catheterization with Possible Intervention;  Surgeon: Larry Chawla MD;  Location:  HEART CARDIAC CATH LAB     CV HEART CATHETERIZATION WITH POSSIBLE INTERVENTION N/A 5/18/2020    Procedure: Heart Catheterization with Possible Intervention;  Surgeon: Gaurang Swenson MD;  Location:  HEART CARDIAC CATH LAB     CV INSTANTANEOUS WAVE-FREE RATIO N/A 3/23/2020    Procedure: Instantaneous Wave-Free Ratio;  Surgeon: Gino Ferrer MD;  Location:  HEART CARDIAC CATH LAB     CV PCI ATHERECTOMY ORBITAL N/A 5/18/2020    Procedure: Percutaneous Coronary Intervention Atherectomy Rotational;  Surgeon: Gaurang Swenson MD;  Location:  HEART CARDIAC CATH LAB     CV PCI STENT DRUG ELUTING N/A 5/18/2020    Procedure: Percutaneous Coronary Intervention Stent Drug Eluting;  Surgeon: Gaurang Swenson MD;  Location:  HEART CARDIAC CATH LAB     CV RIGHT HEART CATH MEASUREMENTS RECORDED N/A 5/28/2020    Procedure: Right Heart Cath;  Surgeon: Larry Chawla MD;  Location:  HEART CARDIAC CATH LAB     CV TEMPORARY PACEMAKER INSERTION N/A 5/18/2020    Procedure: Temporary Pacemaker Insertion;  Surgeon: Gaurang Swenson MD;  Location:  HEART CARDIAC CATH LAB     ESOPHAGOSCOPY, GASTROSCOPY, DUODENOSCOPY (EGD), COMBINED N/A 9/8/2018    Procedure: COMBINED ESOPHAGOSCOPY, GASTROSCOPY, DUODENOSCOPY (EGD), BIOPSY SINGLE OR MULTIPLE;;  Surgeon: Xander Marie MD;  Location:  GI     HEAD & NECK SURGERY       ORTHOPEDIC SURGERY      right arm ulna reset after injury     THORACOSCOPIC WEDGE RESECTION LUNG Right 8/2/2016    Procedure: THORACOSCOPIC WEDGE RESECTION LUNG;  Surgeon: Abdelrahman Noriega MD;  Location:  OR     [facility] NONSPECIFIC PROCEDURE  as a child    T & A. abstracted 7/3/02.      ZZC NONSPECIFIC PROCEDURE  early    CTR. abstracted 7/3/02.         SOCIAL HISTORY:  Social History     Socioeconomic History     Marital status:      Spouse name: Not on file     Number of children: Not on file     Years of education: Not on file     Highest education level: Not on file   Occupational History     Not on file   Tobacco Use     Smoking status: Former Smoker     Packs/day: 1.00     Years: 30.00     Pack years: 30.00     Types: Cigarettes     Quit date: 2013     Years since quittin.4     Smokeless tobacco: Never Used   Substance and Sexual Activity     Alcohol use: Not Currently     Alcohol/week: 42.0 standard drinks     Types: 42 Standard drinks or equivalent per week     Drug use: No     Sexual activity: Not Currently   Other Topics Concern     Parent/sibling w/ CABG, MI or angioplasty before 65F 55M? Not Asked      Service Not Asked     Blood Transfusions Not Asked     Caffeine Concern Yes     Comment: 1 Coke occasionally      Occupational Exposure Not Asked     Hobby Hazards Not Asked     Sleep Concern Not Asked     Stress Concern Not Asked     Weight Concern Not Asked     Special Diet No     Back Care Not Asked     Exercise No     Bike Helmet Not Asked     Seat Belt Not Asked     Self-Exams Not Asked   Social History Narrative    Retired (disability)      Social Determinants of Health     Financial Resource Strain: Not on file   Food Insecurity: Not on file   Transportation Needs: Not on file   Physical Activity: Not on file   Stress: Not on file   Social Connections: Not on file   Intimate Partner Violence: Not on file   Housing Stability: Not on file         FAMILY HISTORY:  Family History   Problem Relation Age of Onset     Blood Disease Mother         Anemia     Cardiovascular Father      Cancer - colorectal Maternal Grandfather      Hypertension Brother      Diabetes Sister 5        Juvinile Diabetes passed at 36     Respiratory Other         Lung  Cancer         PHYSICAL EXAM:  Vital signs:  There were no vitals taken for this visit.   GENERAL/CONSTITUTIONAL: No acute distress.  EYES: No scleral icterus.  ENT/MOUTH: Neck supple. Mask in place.  Remaining exam deferred for detailed discussion.    LABS:  CBC RESULTS: Recent Labs   Lab Test 01/18/22  1048   WBC 10.7   RBC 3.23*   HGB 10.3*   HCT 32.3*      MCH 31.9   MCHC 31.9   RDW 14.6          Last Comprehensive Metabolic Panel:  Sodium   Date Value Ref Range Status   01/18/2022 138 133 - 144 mmol/L Final   06/22/2021 140 133 - 144 mmol/L Final     Potassium   Date Value Ref Range Status   01/18/2022 3.6 3.4 - 5.3 mmol/L Final   06/22/2021 4.0 3.4 - 5.3 mmol/L Final     Chloride   Date Value Ref Range Status   01/18/2022 108 94 - 109 mmol/L Final   06/22/2021 109 94 - 109 mmol/L Final     Carbon Dioxide   Date Value Ref Range Status   06/22/2021 28 20 - 32 mmol/L Final     Carbon Dioxide (CO2)   Date Value Ref Range Status   01/18/2022 26 20 - 32 mmol/L Final     Anion Gap   Date Value Ref Range Status   01/18/2022 4 3 - 14 mmol/L Final   06/22/2021 3 3 - 14 mmol/L Final     Glucose   Date Value Ref Range Status   01/18/2022 85 70 - 99 mg/dL Final   06/22/2021 85 70 - 99 mg/dL Final     Urea Nitrogen   Date Value Ref Range Status   01/18/2022 12 7 - 30 mg/dL Final   06/22/2021 13 7 - 30 mg/dL Final     Creatinine   Date Value Ref Range Status   01/18/2022 0.74 0.66 - 1.25 mg/dL Final   06/22/2021 0.97 0.66 - 1.25 mg/dL Final     GFR Estimate   Date Value Ref Range Status   01/18/2022 >90 >60 mL/min/1.73m2 Final     Comment:     Effective December 21, 2021 eGFRcr in adults is calculated using the 2021 CKD-EPI creatinine equation which includes age and gender (Ab comer al., NEJM, DOI: 10.1056/EHRMsq5568932)   06/22/2021 74 >60 mL/min/[1.73_m2] Final     Comment:     Non  GFR Calc  Starting 12/18/2018, serum creatinine based estimated GFR (eGFR) will be   calculated using the  Chronic Kidney Disease Epidemiology Collaboration   (CKD-EPI) equation.       Calcium   Date Value Ref Range Status   2022 8.3 (L) 8.5 - 10.1 mg/dL Final   2021 8.7 8.5 - 10.1 mg/dL Final     Bilirubin Total   Date Value Ref Range Status   10/26/2021 0.7 0.2 - 1.3 mg/dL Final   2021 0.6 0.2 - 1.3 mg/dL Final     Alkaline Phosphatase   Date Value Ref Range Status   10/26/2021 118 40 - 150 U/L Final   2021 115 40 - 150 U/L Final     ALT   Date Value Ref Range Status   10/26/2021 25 0 - 70 U/L Final   2021 52 0 - 70 U/L Final     AST   Date Value Ref Range Status   10/26/2021 21 0 - 45 U/L Final   2021 37 0 - 45 U/L Final       Vitamin B12 = 767    SPEP - M-spike:  2017: 0.4  2017: 0.4  3/21/2018: 0.4  2018: 0.5  2018: 0.5  2019: 0.7  2020: 0.8  2021: 0.6  2022: 1.3    2022:  IgG = 1936 -> 2276    PATHOLOGY:  None new since prior visit.    IMAGIN2022 Chest CT:  1.  Progressed emphysema.   2.  Progressed fibrotic changes with bronchiectasis and increased interstitial consolidation consistent with underlying usual interstitial pneumonitis, correlate to exclude superimposed infection.  3.  Nonspecific, increasing mediastinal lymphadenopathy, correlate to exclude underlying malignancy or lymphoproliferative cause. No discrete pulmonary mass.  4.  Mildly enlarged heart with coronary artery disease and atherosclerotic vascular disease.    2022 Brain MRI:  1. No acute intracranial process.  2. Multiple small chronic infarcts.  3. Brain atrophy and chronic-appearing white matter disease, which may  reflect a combination of chronic small vessel ischemic changes and  demyelinating disease.  4. No intracranial hemorrhage, extra-axial fluid collection, mass  lesion or herniation.     2022 MR cervical spine:  1.  No abnormal signal or abnormal enhancement cervical spinal cord.  2.  Prominent degenerative changes leading to canal  compromise or neural foraminal narrowing at multiple levels as described above.    1/14/2022 MR thoracic spine:  1.  Acute to subacute moderate to severe compression fracture T11 vertebral body with no evidence of canal compromise.  2.  Acute on chronic severe compression fracture T12 vertebral body with no evidence of canal compromise.  3.  Stable severe chronic L1 compression fracture with cement material within.  4.  No abnormal signal or abnormal enhancement of thoracic spinal cord.  5.  No significant canal compromise or significant neural foraminal narrowing throughout thoracic spine.    ASSESSMENT/PLAN:  Efrem Ramos is a 76 year old male with the following issues:  1. Monoclonal gammopathy of undetermined significance, IgG kappa  -I reviewed the 1/12/2022 labs with Alfredo.  His M-spike has increased from 0.6 to 1.3 g/dL.  He has no evidence of  hypercalcemia, renal failure, or other end organ disease at the present time. He has anemia but this may be related to his acute illness/pneumonia while in-hospital and has improved since hospital discharge to 11.7 g/dL  -I discussed that he has at least 1-2% chance per year of developing multiple myeloma or other lymphoproliferative disorder.  --I recommended repeat bone survey and bone marrow biopsy due to his increase in monoclonal peak. He agrees to proceed.  --Discussed consideration for therapy if his bone marrow biopsy were to confirm multiple myeloma.    2. History of vitamin B12 deficiency  -1/14/2022 Vitamin B12 level was elevated at 1028.  -He discontinued vitamin B12 supplement.    3. Pulmonary fibrosis, history of pulmonary nodule  -Follows with Dr. Abad.  -1/11/2022 Chest CT scan showed progressed emphysema, fibrotic changes, increased interstitial consolidation with underlying usual interstitial pneumonitis.  He also has bronchiectasis, severe CAD. The scan also showed nonspecific increasing mediastinal lymphadenopathy but no discrete pulmonary mass.  " This may be potentially reactive given his pneumonia.    4. Moderate aortic stenosis  -Seen by Dr. Cortes previously in 8/2019.  -Intermediate to high risk surgical candidate.  Not felt to need TAVR at present time due to relative asymptomatic status.    5. Lumbar spine pain with history of L1 compression fracture  -His mid back pain is chronic since at least 2018 when he underwent vertebroplasty for his L1 compression fracture but subsequently suffered a fall and reinjuring his back.    --He currently has weakness and undergoing rehab at [facility].    Cherelle Hurley MD  Hematology/Oncology  [facility]    Total time spent: 40 minutes in patient evaluation, counseling, documentation, and coordination of care.    Oncology Rooming Note    January 31, 2022 3:51 PM   Eferm Ramos is a 76 year old male who presents for:    Chief Complaint   Patient presents with     Oncology Clinic Visit     Vocal cord cancer (H)     Initial Vitals: /71   Pulse 89   Temp 97.5  F (36.4  C) (Oral)   Resp 16   Ht 1.727 m (5' 8\")   Wt 62 kg (136 lb 9.6 oz)   SpO2 100%   BMI 20.77 kg/m   Estimated body mass index is 20.77 kg/m  as calculated from the following:    Height as of this encounter: 1.727 m (5' 8\").    Weight as of this encounter: 62 kg (136 lb 9.6 oz). Body surface area is 1.72 meters squared.  Severe Pain (6) Comment: Between a 6 and a 10   No LMP for male patient.  Allergies reviewed: Yes  Medications reviewed: Yes    Medications: Medication refills not needed today.  Pharmacy name entered into [employer]:    [facility][id number] - [place], [place] - [address] AT [address]  [facility] #[id number] - [place], [place] - [address] AT [address]    Clinical concerns: None       Ashely Epstein MA                  Again, thank you for allowing me to participate in the care of your patient.        Sincerely,        Cherelle Hurley MD    "

## 2022-01-31 NOTE — PROGRESS NOTES
"Oncology Rooming Note    January 31, 2022 3:51 PM   Efrem Ramos is a 76 year old male who presents for:    Chief Complaint   Patient presents with     Oncology Clinic Visit     Vocal cord cancer (H)     Initial Vitals: /71   Pulse 89   Temp 97.5  F (36.4  C) (Oral)   Resp 16   Ht 1.727 m (5' 8\")   Wt 62 kg (136 lb 9.6 oz)   SpO2 100%   BMI 20.77 kg/m   Estimated body mass index is 20.77 kg/m  as calculated from the following:    Height as of this encounter: 1.727 m (5' 8\").    Weight as of this encounter: 62 kg (136 lb 9.6 oz). Body surface area is 1.72 meters squared.  Severe Pain (6) Comment: Between a 6 and a 10   No LMP for male patient.  Allergies reviewed: Yes  Medications reviewed: Yes    Medications: Medication refills not needed today.  Pharmacy name entered into [facility]:    [facility] #[id number] - [place], [place] - [address] AT [address]  [facility] #[id number] - [place], [place] - [address] AT [facility] [address]    Clinical concerns: None       Ashely Epstein MA              "

## 2022-02-01 NOTE — PROGRESS NOTES
"[facility] GERIATRICS    Chief Complaint   Patient presents with     Nursing Home Acute     HPI:  Efrem Ramos is a 76 year old  (1945), who is being seen today for an episodic care visit at: [facility]) [25]. Today's concern is:   Pneumonia/IPF: patient sitting up in w/c in room, denies cough, congestion, SOB, SaO2 96% on room air, patient walking up to 250 feet using a RW with SBA, SBA with ADL's, patient states he does have some HIGH  CXR from 1/31/22 shows: all findings stable, no evidence of new pneumonia  Restless leg syndrome: patient having \"jumpy legs\" during the night, waking him up at night, patient states improvement noted  Leukocytosis: WBC 13K on 1/28/22  Failure to thrive/deconditioning: patient working with therapy, walking as above BIMS 15/15, SBT 23/30 indicating mild impairement  CAD/AF/CHF/HTN: /76, 100/60, 92/62, with HR 70-80 range, denies CP, palpitations, patient states he feels a little dizzy with standing. Weight stable, current weight  133.9lbs, admission weight was 138.2lbs  Hx of CVA/dysphagia: ST eval, patient continues on thickened liquids and pureed diet    Allergies, and PMH/PSH reviewed in [facility] today.  REVIEW OF SYSTEMS:  10 point ROS of systems including Constitutional, Eyes, Respiratory, Cardiovascular, Gastroenterology, Genitourinary, Integumentary, Musculoskeletal, Psychiatric were all negative except for pertinent positives noted in my HPI.    Objective:   /76   Pulse 82   Temp 97.8  F (36.6  C)   Resp 16   Ht 1.727 m (5' 8\")   Wt 60.7 kg (133 lb 14.4 oz)   SpO2 96%   BMI 20.36 kg/m    GENERAL APPEARANCE:  Alert, in no distress  ENT:  Mouth and posterior oropharynx normal, moist mucous membranes, [place]  EYES:  EOM, conjunctivae, lids, pupils and irises normal, PERRL  RESP:  respiratory effort and palpation of chest normal, lungs clear to auscultation , no respiratory distress  CV:  Palpation and auscultation of heart done , regular rate " and rhythm, no murmur, rub, or gallop, no edema  ABDOMEN:  normal bowel sounds, soft, nontender, no hepatosplenomegaly or other masses  M/S:   patient sitting in bed at time of exam  SKIN:  Inspection of skin and subcutaneous tissue baseline  NEURO:   speech wnl       Recent labs in [facility] reviewed by me today.     Assessment/Plan:  (J15.6) Pneumonia due to Moraxella catarrhalis (H)  (primary encounter diagnosis)  (J84.112) IPF (idiopathic pulmonary fibrosis) (H)  Comment: acute/ongoing  Plan: monitor SaO2 at rest and with activity, levaquin finished,  budesonide taper  Repeat CXR due to ongoing leukocytosis  WBC 13K 1/28/22,   CXR 1/27/22 shows cardiomegaly, no CHF, COPD with increased bronchovascular markings broadly throughout the right lung field from upper to lower lobes that might represent acute bronchitis or evolving pneumonitis, extensive diaphragmatic pleural calcifications consistent with asbestosis  Repeat CXR 1/31/22 finding stable no evidence of new pneumonia  IS q 2 hours while awake    (D47.2) MGUS  Ongoing  Did have oncology appt Dr Hurley  1/31/22 will have a bone marrow biopsy coming up due to increase in monoclonal peak to evaluate for Multiple myeloma    (R13.10) dysphagia, oropharyngeal  Video swallow study 1/12/22  Acute/ongoing  Plan: pureed diet with moderately thick liquids, ST evaluation and treatment  Repeat video swallow in 2 weeks.      (R62.7) Failure to thrive in adult  Comment: acute/ongoing  Plan: dietician to consult, ST eval and Tx of dyphagia     (R53.81) Physical deconditioning  Comment: acute/ongoing  Plan: PT and OT for strengthening     (I25.10) Coronary artery disease involving native coronary artery of native heart without angina pectoris  (I48.20) Chronic a-fib (H)  (I10) Benign essential hypertension  (I35.0) Nonrheumatic aortic (valve) stenosis  Comment: ongoing  Plan: vitals daily and prn, BMP follow, continue digoxin 125mcg QD, lasix 20mg QD, KCL 10meq QD, metoprolol xl  25mg QD, eliquis 5mg BID, plavix 75mg QD    (G25.81) Restless Leg Syndrome  Acute/ongoing   Started on   (Z86.73) History of CVA (cerebrovascular accident)  Comment: ongoing  Plan: continue plavix 75mg QD, lipitor 40mg QD,     Orders:  CBC and BMP on Thursday    Electronically signed by: Tonya Lynn Haase, APRN CNP

## 2022-02-01 NOTE — LETTER
"    2/1/2022        RE: Efrem Ramos  [address] [facility] [address]        [facility] GERIATRICS    Chief Complaint   Patient presents with     Nursing Home Acute     HPI:  Efrem Ramos is a 76 year old  (1945), who is being seen today for an episodic care visit at: [facility]) [25]. Today's concern is:   Pneumonia/IPF: patient sitting up in w/c in room, denies cough, congestion, SOB, SaO2 96% on room air, patient walking up to 250 feet using a RW with SBA, SBA with ADL's, patient states he does have some HIGH  CXR from 1/31/22 shows: all findings stable, no evidence of new pneumonia  Restless leg syndrome: patient having \"jumpy legs\" during the night, waking him up at night, patient states improvement noted  Leukocytosis: WBC 13K on 1/28/22  Failure to thrive/deconditioning: patient working with therapy, walking as above BIMS 15/15, SBT 23/30 indicating mild impairement  CAD/AF/CHF/HTN: /76, 100/60, 92/62, with HR 70-80 range, denies CP, palpitations, patient states he feels a little dizzy with standing. Weight stable, current weight  133.9lbs, admission weight was 138.2lbs  Hx of CVA/dysphagia: ST eval, patient continues on thickened liquids and pureed diet    Allergies, and PMH/PSH reviewed in [facility] today.  REVIEW OF SYSTEMS:  10 point ROS of systems including Constitutional, Eyes, Respiratory, Cardiovascular, Gastroenterology, Genitourinary, Integumentary, Musculoskeletal, Psychiatric were all negative except for pertinent positives noted in my HPI.    Objective:   /76   Pulse 82   Temp 97.8  F (36.6  C)   Resp 16   Ht 1.727 m (5' 8\")   Wt 60.7 kg (133 lb 14.4 oz)   SpO2 96%   BMI 20.36 kg/m    GENERAL APPEARANCE:  Alert, in no distress  ENT:  Mouth and posterior oropharynx normal, moist mucous membranes, [place]  EYES:  EOM, conjunctivae, lids, pupils and irises normal, PERRL  RESP:  respiratory effort and palpation of chest normal, lungs clear to " auscultation , no respiratory distress  CV:  Palpation and auscultation of heart done , regular rate and rhythm, no murmur, rub, or gallop, no edema  ABDOMEN:  normal bowel sounds, soft, nontender, no hepatosplenomegaly or other masses  M/S:   patient sitting in bed at time of exam  SKIN:  Inspection of skin and subcutaneous tissue baseline  NEURO:   speech wnl       Recent labs in [facility] reviewed by me today.     Assessment/Plan:  (J15.6) Pneumonia due to Moraxella catarrhalis (H)  (primary encounter diagnosis)  (J84.112) IPF (idiopathic pulmonary fibrosis) (H)  Comment: acute/ongoing  Plan: monitor SaO2 at rest and with activity, levaquin finished,  budesonide taper  Repeat CXR due to ongoing leukocytosis  WBC 13K 1/28/22,   CXR 1/27/22 shows cardiomegaly, no CHF, COPD with increased bronchovascular markings broadly throughout the right lung field from upper to lower lobes that might represent acute bronchitis or evolving pneumonitis, extensive diaphragmatic pleural calcifications consistent with asbestosis  Repeat CXR 1/31/22 finding stable no evidence of new pneumonia  IS q 2 hours while awake    (D47.2) MGUS  Ongoing  Did have oncology appt Dr Hurley  1/31/22 will have a bone marrow biopsy coming up due to increase in monoclonal peak to evaluate for Multiple myeloma    (R13.10) dysphagia, oropharyngeal  Video swallow study 1/12/22  Acute/ongoing  Plan: pureed diet with moderately thick liquids, ST evaluation and treatment  Repeat video swallow in 2 weeks.      (R62.7) Failure to thrive in adult  Comment: acute/ongoing  Plan: dietician to consult, ST eval and Tx of dyphagia     (R53.81) Physical deconditioning  Comment: acute/ongoing  Plan: PT and OT for strengthening     (I25.10) Coronary artery disease involving native coronary artery of native heart without angina pectoris  (I48.20) Chronic a-fib (H)  (I10) Benign essential hypertension  (I35.0) Nonrheumatic aortic (valve)  stenosis  Comment: ongoing  Plan: vitals daily and prn, BMP follow, continue digoxin 125mcg QD, lasix 20mg QD, KCL 10meq QD, metoprolol xl 25mg QD, eliquis 5mg BID, plavix 75mg QD    (G25.81) Restless Leg Syndrome  Acute/ongoing   Started on   (Z86.73) History of CVA (cerebrovascular accident)  Comment: ongoing  Plan: continue plavix 75mg QD, lipitor 40mg QD,     Orders:  CBC and BMP on Thursday    Electronically signed by: Tonya Lynn Haase, APRN CNP           Sincerely,        Tonya Lynn Haase, APRN CNP

## 2022-02-07 NOTE — LETTER
"    2/7/2022        RE: Efrem Ramos  [address] [facility] [address]        [facility] GERIATRICS    Chief Complaint   Patient presents with     Nursing Home Acute     HPI:  Efrem Ramos is a 76 year old  (1945), who is being seen today for an episodic care visit at: [facility]) [25]. Today's concern is:   Pneumonia/IPF: patient sitting up in w/c in room, denies cough, congestion, SOB, SaO2 96% on room air, patient walking up to 250 feet using a RW with SBA, SBA with ADL's, continues to have HIGH  CXR from 1/31/22 shows: all findings stable, no evidence of new pneumonia  Dizziness: patient c/o dizziness today, states dizziness started a few days ago, patient states room is spinning, he has some occasional nausea with dizziness, dizziness ongoing throughout the day, patient not able to walk as much due to dizziness, states laying down helps, nothing else alleviates dizziness.   Restless leg syndrome: patient having \"jumpy legs\" during the night, waking him up at night, started on requip but not sure if there is any improvement in RLS  Leukocytosis: WBC 13K on 1/28/22  Failure to thrive/deconditioning: patient working with therapy, walking as above BIMS 15/15, SBT 23/30 indicating mild impairement  CAD/AF/CHF/HTN: /71, 113/76, 114/73, with HR 80-90 range, denies CP, palpitations, c/o dizziness as above. Weight stable, current weight  132.5lbs, admission weight was 138.2lbs    Allergies, and PMH/PSH reviewed in [facility] today.  REVIEW OF SYSTEMS:  10 point ROS of systems including Constitutional, Eyes, Respiratory, Cardiovascular, Gastroenterology, Genitourinary, Integumentary, Musculoskeletal, Psychiatric were all negative except for pertinent positives noted in my HPI.    Objective:   /71   Pulse 85   Temp 97.4  F (36.3  C)   Resp 20   Ht 1.727 m (5' 8\")   Wt 60.2 kg (132 lb 11.2 oz)   SpO2 94%   BMI 20.18 kg/m    GENERAL APPEARANCE:  Alert, in no distress  ENT:  " Mouth and posterior oropharynx normal, moist mucous membranes, [place]  EYES:  EOM, conjunctivae, lids, pupils and irises normal, PERRL  RESP:  respiratory effort and palpation of chest normal, lungs clear to auscultation , no respiratory distress  CV:  Palpation and auscultation of heart done , regular rate and rhythm, no murmur, rub, or gallop, no edema  ABDOMEN:  normal bowel sounds, soft, nontender, no hepatosplenomegaly or other masses  M/S:   patient sitting up in chair  SKIN:  Inspection of skin and subcutaneous tissue baseline  NEURO:   speech wnl    Recent labs in [facility] reviewed by me today.  and   Most Recent 3 CBC's:Recent Labs   Lab Test 01/18/22  1048 01/15/22  0635 01/13/22  0623   WBC 10.7 11.0 12.7*   HGB 10.3* 10.2* 10.2*    100 100    313 306     Most Recent 3 BMP's:Recent Labs   Lab Test 01/18/22  1048 01/17/22  0708 01/15/22  0635    138 141   POTASSIUM 3.6 3.5 3.5   CHLORIDE 108 108 109   CO2 26 25 29   BUN 12 11 14   CR 0.74 0.79 0.79   ANIONGAP 4 5 3   ULISSES 8.3* 8.0* 8.1*   GLC 85 81 83       Assessment/Plan:  (J15.6) Pneumonia due to Moraxella catarrhalis (H)  (primary encounter diagnosis)  (J84.112) IPF (idiopathic pulmonary fibrosis) (H)  Comment: acute/ongoing  Plan: monitor SaO2 at rest and with activity, levaquin finished,  budesonide taper  Repeat CXR due to ongoing leukocytosis  WBC 10.6 on 2/3/22  Last CXR 1/31/22      (D47.2) MGUS  Ongoing  Did have oncology appt Dr Hurley  1/31/22 will have a bone marrow biopsy coming up due to increase in monoclonal peak to evaluate for Multiple myeloma     (R13.10) dysphagia, oropharyngeal  Video swallow study 1/12/22  Acute/ongoing  Plan: pureed diet with moderately thick liquids, ST evaluation and treatment  Repeat video swallow ordered.      (R62.7) Failure to thrive in adult  Comment: acute/ongoing  Plan: dietician to consult, ST eval and Tx of dyphagia     (R53.81) Physical deconditioning  Comment: acute/ongoing  Plan: PT and OT  for strengthening     (I25.10) Coronary artery disease involving native coronary artery of native heart without angina pectoris  (I48.20) Chronic a-fib (H)  (I10) Benign essential hypertension  (I35.0) Nonrheumatic aortic (valve) stenosis  Comment: ongoing  Plan: vitals daily and prn, BMP follow, continue digoxin 125mcg QD, lasix 20mg QD, KCL 10meq QD, metoprolol tartrate 12.5mg BID, eliquis 5mg BID, plavix 75mg QD    (R42) Dizziness  Acute/ongoing: DC requip, change metoprolol to tartrated 12.5mg BID, if no improvement would start meclizine 12.5mg BID     (G25.81) Restless Leg Syndrome  Acute/ongoing   DC requip  (Z86.73) History of CVA (cerebrovascular accident)  Comment: ongoing  Plan: continue plavix 75mg QD, lipitor 40mg QD,     Orders:  DC requip  Metoprolol 12.5mg BID  Video swallow study       Electronically signed by: Tonya Lynn Haase, APRN CNP           Sincerely,        Tonya Lynn Haase, APRN CNP

## 2022-02-10 NOTE — PROGRESS NOTES
"[facility] GERIATRICS    Chief Complaint   Patient presents with     Nursing Home Acute     HPI:  Efrem Ramos is a 76 year old  (1945), who is being seen today for an episodic care visit at: [facility] ([facility]) [25]. Today's concern is:   Vertigo: patient having dizziness, states room is spinning, symptoms seem to be continuous and not associated with BP or HR, activity, patient states ongoing vertigo when sitting, does not seem to worsen with standing but walking is difficult, he feels unsteady. Therapy have not been able to walk patient due to vertigo.   Dysphagia: diet liberalized to minced and moist textures, continues on thickened liquids, video swallow study ordered.   Pneumonia: appears resolved  IPF: patient has HIGH but no SOB at rest, denies cough or congestion, SaO2 94% on room air  PAF/HTN: BP stable 101/66, 111/75, 125/83 with HR 70-80 range, denies CP, palpitations    Allergies, and PMH/PSH reviewed in [facility] today.  REVIEW OF SYSTEMS:  10 point ROS of systems including Constitutional, Eyes, Respiratory, Cardiovascular, Gastroenterology, Genitourinary, Integumentary, Musculoskeletal, Psychiatric were all negative except for pertinent positives noted in my HPI.    Objective:   /66   Pulse 78   Temp 98.5  F (36.9  C)   Resp 16   Ht 1.727 m (5' 8\")   Wt 59.5 kg (131 lb 1.6 oz)   SpO2 94%   BMI 19.93 kg/m    GENERAL APPEARANCE:  Alert, in no distress  ENT:  Mouth and posterior oropharynx normal, moist mucous membranes, [place]  EYES:  EOM, conjunctivae, lids, pupils and irises normal, PERRL  RESP:  respiratory effort and palpation of chest normal, lungs clear to auscultation , no respiratory distress, diminished breath sounds bases bilaterally  CV:  Palpation and auscultation of heart done , regular rate and rhythm, no murmur, rub, or gallop, no edema  ABDOMEN:  normal bowel sounds, soft, nontender, no hepatosplenomegaly or other masses  M/S:   sitting up in bed, able to move all 4 " extremities  SKIN:  Inspection of skin and subcutaneous tissue baseline  NEURO:   speech wnl    Recent labs in [facility] reviewed by me today.     Assessment/Plan:  (J15.6) Pneumonia due to Moraxella catarrhalis (H)  (primary encounter diagnosis)  (J84.112) IPF (idiopathic pulmonary fibrosis) (H)  Comment: acute/ongoing  Plan: monitor SaO2 at rest and with activity, levaquin finished,  budesonide taper at 3mg QD will end 2/16/22  WBC 10.6 on 2/3/22  Last CXR 1/31/22      (D47.2) MGUS  Ongoing  Did have oncology appt Dr Hurley  1/31/22 will have a bone marrow biopsy coming up due to increase in monoclonal peak to evaluate for Multiple myeloma     (R13.10) dysphagia, oropharyngeal  Video swallow study 1/12/22  Acute/ongoing  Plan: upgraded to minced and moist textures with moderately thick liquids, ST evaluation and treatment  Repeat video swallow ordered.       (R53.81) Physical deconditioning  Comment: acute/ongoing  Plan: PT and OT for strengthening     (I25.10) Coronary artery disease involving native coronary artery of native heart without angina pectoris  (I48.20) Chronic a-fib (H)  (I10) Benign essential hypertension  (I35.0) Nonrheumatic aortic (valve) stenosis  Comment: ongoing  Plan: vitals daily and prn, BMP follow, continue digoxin 125mcg QD, lasix 20mg QD, KCL 10meq QD, metoprolol tartrate 12.5mg BID, eliquis 5mg BID, plavix 75mg QD     (R42) Dizziness  Acute/ongoing: start meclizine 12.5mg BID 8am and noon and 12.5mg q 6 hours prn  Discussed dizziness at length, education on vertigo given, will order meclizine for symptom relief and reevaluate.     Orders:  Meclizine 12.5mg BID scheduled and 12.5mg q 6 hours prn    Electronically signed by: Tonya Lynn Haase, APRN CNP

## 2022-02-10 NOTE — LETTER
"    2/10/2022        RE: Efrme Ramos  [address] [facility] [address]        [facility] GERIATRICS    Chief Complaint   Patient presents with     Nursing Home Acute     HPI:  Efrem Ramos is a 76 year old  (1945), who is being seen today for an episodic care visit at: [facility] ([facility]) [25]. Today's concern is:   Vertigo: patient having dizziness, states room is spinning, symptoms seem to be continuous and not associated with BP or HR, activity, patient states ongoing vertigo when sitting, does not seem to worsen with standing but walking is difficult, he feels unsteady. Therapy have not been able to walk patient due to vertigo.   Dysphagia: diet liberalized to minced and moist textures, continues on thickened liquids, video swallow study ordered.   Pneumonia: appears resolved  IPF: patient has HIGH but no SOB at rest, denies cough or congestion, SaO2 94% on room air  PAF/HTN: BP stable 101/66, 111/75, 125/83 with HR 70-80 range, denies CP, palpitations    Allergies, and PMH/PSH reviewed in [facility] today.  REVIEW OF SYSTEMS:  10 point ROS of systems including Constitutional, Eyes, Respiratory, Cardiovascular, Gastroenterology, Genitourinary, Integumentary, Musculoskeletal, Psychiatric were all negative except for pertinent positives noted in my HPI.    Objective:   /66   Pulse 78   Temp 98.5  F (36.9  C)   Resp 16   Ht 1.727 m (5' 8\")   Wt 59.5 kg (131 lb 1.6 oz)   SpO2 94%   BMI 19.93 kg/m    GENERAL APPEARANCE:  Alert, in no distress  ENT:  Mouth and posterior oropharynx normal, moist mucous membranes, [place]  EYES:  EOM, conjunctivae, lids, pupils and irises normal, PERRL  RESP:  respiratory effort and palpation of chest normal, lungs clear to auscultation , no respiratory distress, diminished breath sounds bases bilaterally  CV:  Palpation and auscultation of heart done , regular rate and rhythm, no murmur, rub, or gallop, no edema  ABDOMEN:  normal bowel sounds, soft, " nontender, no hepatosplenomegaly or other masses  M/S:   sitting up in bed, able to move all 4 extremities  SKIN:  Inspection of skin and subcutaneous tissue baseline  NEURO:   speech wnl    Recent labs in [facility] reviewed by me today.     Assessment/Plan:  (J15.6) Pneumonia due to Moraxella catarrhalis (H)  (primary encounter diagnosis)  (J84.112) IPF (idiopathic pulmonary fibrosis) (H)  Comment: acute/ongoing  Plan: monitor SaO2 at rest and with activity, levaquin finished,  budesonide taper at 3mg QD will end 2/16/22  WBC 10.6 on 2/3/22  Last CXR 1/31/22      (D47.2) MGUS  Ongoing  Did have oncology appt Dr Hurley  1/31/22 will have a bone marrow biopsy coming up due to increase in monoclonal peak to evaluate for Multiple myeloma     (R13.10) dysphagia, oropharyngeal  Video swallow study 1/12/22  Acute/ongoing  Plan: upgraded to minced and moist textures with moderately thick liquids, ST evaluation and treatment  Repeat video swallow ordered.       (R53.81) Physical deconditioning  Comment: acute/ongoing  Plan: PT and OT for strengthening     (I25.10) Coronary artery disease involving native coronary artery of native heart without angina pectoris  (I48.20) Chronic a-fib (H)  (I10) Benign essential hypertension  (I35.0) Nonrheumatic aortic (valve) stenosis  Comment: ongoing  Plan: vitals daily and prn, BMP follow, continue digoxin 125mcg QD, lasix 20mg QD, KCL 10meq QD, metoprolol tartrate 12.5mg BID, eliquis 5mg BID, plavix 75mg QD     (R42) Dizziness  Acute/ongoing: start meclizine 12.5mg BID 8am and noon and 12.5mg q 6 hours prn  Discussed dizziness at length, education on vertigo given, will order meclizine for symptom relief and reevaluate.     Orders:  Meclizine 12.5mg BID scheduled and 12.5mg q 6 hours prn    Electronically signed by: Tonya Lynn Haase, APRN CNP           Sincerely,        Tonya Lynn Haase, APRN CNP

## 2022-02-14 NOTE — LETTER
"    2/14/2022        RE: Efrem Ramos  [address] [facility] [address]        [facility] GERIATRICS    Chief Complaint   Patient presents with     Nursing Home Acute     HPI:  Efrem Ramos is a 76 year old  (1945), who is being seen today for an episodic care visit at: [facility] ([facility]) [25]. Today's concern is:   Vertigo: improved with meclizine, patient states he would rate vertigo as 8/10 last week and now vertigo improved to 3/10, he still has a little vertigo, he is walking with therapy, walking up to 190 feet with SBA   Dysphagia: diet liberalized to minced and moist textures, continues on thickened liquids, video swallow study pending  IPF: patient has HIGH but no SOB at rest, denies cough or congestion, SaO2 98% on room air  PAF/HTN: BP stable 118/74, 114/78, 120/82 with HR 80-90 range, denies CP, palpitations    Allergies, and PMH/PSH reviewed in [facility] today.  REVIEW OF SYSTEMS:  10 point ROS of systems including Constitutional, Eyes, Respiratory, Cardiovascular, Gastroenterology, Genitourinary, Integumentary, Musculoskeletal, Psychiatric were all negative except for pertinent positives noted in my HPI.    Objective:   /74   Pulse 90   Temp 97.5  F (36.4  C)   Resp 18   Ht 1.727 m (5' 8\")   Wt 60.9 kg (134 lb 3.2 oz)   SpO2 96%   BMI 20.41 kg/m    GENERAL APPEARANCE:  Alert, in no distress  ENT:  Mouth and posterior oropharynx normal, moist mucous membranes, [place]  EYES:  EOM, conjunctivae, lids, pupils and irises normal, PERRL  RESP:  respiratory effort and palpation of chest normal, lungs clear to auscultation , no respiratory distress, diminished breath sounds bases bilaterally  CV:  Palpation and auscultation of heart done , regular rate and rhythm, no murmur, rub, or gallop, no edema  ABDOMEN:  normal bowel sounds, soft, nontender, no hepatosplenomegaly or other masses  M/S:   sitting up in chair, able to move all 4 extremities, patient gait steady while " walking with therapy  SKIN:  Inspection of skin and subcutaneous tissue baseline  NEURO:   speech wnl       Recent labs in [facility] reviewed by me today.  and   Most Recent 3 CBC's:Recent Labs   Lab Test 01/18/22  1048 01/15/22  0635 01/13/22  0623   WBC 10.7 11.0 12.7*   HGB 10.3* 10.2* 10.2*    100 100    313 306     Most Recent 3 BMP's:Recent Labs   Lab Test 01/18/22  1048 01/17/22  0708 01/15/22  0635    138 141   POTASSIUM 3.6 3.5 3.5   CHLORIDE 108 108 109   CO2 26 25 29   BUN 12 11 14   CR 0.74 0.79 0.79   ANIONGAP 4 5 3   ULISSES 8.3* 8.0* 8.1*   GLC 85 81 83       Assessment/Plan:    (J84.112) IPF (idiopathic pulmonary fibrosis) (H)  Comment: acute/ongoing  Plan: monitor SaO2 at rest and with activity, levaquin finished,  budesonide taper at 3mg QD will end 2/16/22  WBC 10.6 on 2/3/22  Last CXR 1/31/22      (D47.2) MGUS  Ongoing  Did have oncology appt Dr Hurley  1/31/22 will have a bone marrow biopsy coming up due to increase in monoclonal peak to evaluate for Multiple myeloma     (R13.10) dysphagia, oropharyngeal  Video swallow study 1/12/22  Acute/ongoing  Plan: upgraded to minced and moist textures with moderately thick liquids, ST evaluation and treatment  Repeat video swallow ordered.       (R53.81) Physical deconditioning  Comment: acute/ongoing  Plan: PT and OT for strengthening     (I25.10) Coronary artery disease involving native coronary artery of native heart without angina pectoris  (I48.20) Chronic a-fib (H)  (I10) Benign essential hypertension  (I35.0) Nonrheumatic aortic (valve) stenosis  Comment: ongoing  Plan: vitals daily and prn, BMP follow, continue digoxin 125mcg QD, lasix 20mg QD, KCL 10meq QD, metoprolol tartrate 12.5mg BID, eliquis 5mg BID, plavix 75mg QD     (R42) Dizziness  Acute/ongoing: increase meclizine to 25mg BID 8am and noon and 25mg q 6 hours prn       Orders:  Increase meclizine to 25mg BID scheduled and 25mg q 6 hours prn      Electronically signed by: Nafisa  Lynn Haase, APRN CNP           Sincerely,        Tonya Lynn Haase, APRN CNP

## 2022-02-14 NOTE — PROGRESS NOTES
"[facility] GERIATRICS    Chief Complaint   Patient presents with     Nursing Home Acute     HPI:  Efrem Ramos is a 76 year old  (1945), who is being seen today for an episodic care visit at: [facility] ([facility]) [25]. Today's concern is:   Vertigo: improved with meclizine, patient states he would rate vertigo as 8/10 last week and now vertigo improved to 3/10, he still has a little vertigo, he is walking with therapy, walking up to 190 feet with SBA   Dysphagia: diet liberalized to minced and moist textures, continues on thickened liquids, video swallow study pending  IPF: patient has HIGH but no SOB at rest, denies cough or congestion, SaO2 98% on room air  PAF/HTN: BP stable 118/74, 114/78, 120/82 with HR 80-90 range, denies CP, palpitations    Allergies, and PMH/PSH reviewed in [facility] today.  REVIEW OF SYSTEMS:  10 point ROS of systems including Constitutional, Eyes, Respiratory, Cardiovascular, Gastroenterology, Genitourinary, Integumentary, Musculoskeletal, Psychiatric were all negative except for pertinent positives noted in my HPI.    Objective:   /74   Pulse 90   Temp 97.5  F (36.4  C)   Resp 18   Ht 1.727 m (5' 8\")   Wt 60.9 kg (134 lb 3.2 oz)   SpO2 96%   BMI 20.41 kg/m    GENERAL APPEARANCE:  Alert, in no distress  ENT:  Mouth and posterior oropharynx normal, moist mucous membranes, [place]  EYES:  EOM, conjunctivae, lids, pupils and irises normal, PERRL  RESP:  respiratory effort and palpation of chest normal, lungs clear to auscultation , no respiratory distress, diminished breath sounds bases bilaterally  CV:  Palpation and auscultation of heart done , regular rate and rhythm, no murmur, rub, or gallop, no edema  ABDOMEN:  normal bowel sounds, soft, nontender, no hepatosplenomegaly or other masses  M/S:   sitting up in chair, able to move all 4 extremities, patient gait steady while walking with therapy  SKIN:  Inspection of skin and subcutaneous tissue baseline  NEURO:   speech " wnl       Recent labs in [facility] reviewed by me today.  and   Most Recent 3 CBC's:Recent Labs   Lab Test 01/18/22  1048 01/15/22  0635 01/13/22  0623   WBC 10.7 11.0 12.7*   HGB 10.3* 10.2* 10.2*    100 100    313 306     Most Recent 3 BMP's:Recent Labs   Lab Test 01/18/22  1048 01/17/22  0708 01/15/22  0635    138 141   POTASSIUM 3.6 3.5 3.5   CHLORIDE 108 108 109   CO2 26 25 29   BUN 12 11 14   CR 0.74 0.79 0.79   ANIONGAP 4 5 3   ULISSES 8.3* 8.0* 8.1*   GLC 85 81 83       Assessment/Plan:    (J84.112) IPF (idiopathic pulmonary fibrosis) (H)  Comment: acute/ongoing  Plan: monitor SaO2 at rest and with activity, levaquin finished,  budesonide taper at 3mg QD will end 2/16/22  WBC 10.6 on 2/3/22  Last CXR 1/31/22      (D47.2) MGUS  Ongoing  Did have oncology appt Dr Hurley  1/31/22 will have a bone marrow biopsy coming up due to increase in monoclonal peak to evaluate for Multiple myeloma     (R13.10) dysphagia, oropharyngeal  Video swallow study 1/12/22  Acute/ongoing  Plan: upgraded to minced and moist textures with moderately thick liquids, ST evaluation and treatment  Repeat video swallow ordered.       (R53.81) Physical deconditioning  Comment: acute/ongoing  Plan: PT and OT for strengthening     (I25.10) Coronary artery disease involving native coronary artery of native heart without angina pectoris  (I48.20) Chronic a-fib (H)  (I10) Benign essential hypertension  (I35.0) Nonrheumatic aortic (valve) stenosis  Comment: ongoing  Plan: vitals daily and prn, BMP follow, continue digoxin 125mcg QD, lasix 20mg QD, KCL 10meq QD, metoprolol tartrate 12.5mg BID, eliquis 5mg BID, plavix 75mg QD     (R42) Dizziness  Acute/ongoing: increase meclizine to 25mg BID 8am and noon and 25mg q 6 hours prn       Orders:  Increase meclizine to 25mg BID scheduled and 25mg q 6 hours prn      Electronically signed by: Tonya Lynn Haase, APRN CNP

## 2022-02-17 NOTE — LETTER
"    2/17/2022        RE: Efrem Ramos  [address] [address]        [facility] GERIATRICS    Chief Complaint   Patient presents with     Nursing Home Acute     HPI:  Efrem Ramos is a 76 year old  (1945), who is being seen today for an episodic care visit at: [facility] ([facility]) [25]. Today's concern is:   Vertigo: today patient states vertigo is ongoing and really hasn't improved, c/o vertigo all of the time, no change with position, therapy states gait is unsteady due to dizziness, he is requiring SBA walking up to 150 feet using a RW  IPF: patient has HIGH but no SOB at rest, denies cough or congestion, SaO2 94-98% on room air  CAD/PAF/AS: BP 99/67, 96/65, 100/67 with HR 90's denies CP, palpitations, SOB    Allergies, and PMH/PSH reviewed in [facility] today.  REVIEW OF SYSTEMS:  10 point ROS of systems including Constitutional, Eyes, Respiratory, Cardiovascular, Gastroenterology, Genitourinary, Integumentary, Musculoskeletal, Psychiatric were all negative except for pertinent positives noted in my HPI.    Objective:   BP 96/65   Pulse 94   Temp 97.4  F (36.3  C)   Resp 16   Ht 1.727 m (5' 8\")   Wt 61 kg (134 lb 6.4 oz)   SpO2 97%   BMI 20.44 kg/m    GENERAL APPEARANCE:  Alert, in no distress  ENT:  Mouth and posterior oropharynx normal, moist mucous membranes, [place]  EYES:  EOM, conjunctivae, lids, pupils and irises normal, PERRL  RESP:  respiratory effort and palpation of chest normal, lungs clear to auscultation , no respiratory distress, diminished breath sounds bases bilaterally  CV:  Palpation and auscultation of heart done , regular rate and rhythm, no murmur, rub, or gallop, no edema  ABDOMEN:  normal bowel sounds, soft, nontender, no hepatosplenomegaly or other masses  M/S:   sitting up in chair, able to move all 4 extremities  SKIN:  Inspection of skin and subcutaneous tissue baseline  NEURO:   speech wnl    Recent labs in [facility] reviewed by me today. "     Assessment/Plan:  (R42) Vertigo  Acute/ongoing:  meclizine to 25mg BID 8am and noon and 25mg q 6 hours prn  Make appt with neurology for c/o central vertigo        (J84.112) IPF (idiopathic pulmonary fibrosis) (H)  Comment: acute/ongoing  Plan: monitor SaO2 at rest and with activity, levaquin finished,  budesonide taper at 3mg QD finished  WBC 10.6 on 2/3/22  Last CXR 1/31/22     I25.10) Coronary artery disease involving native coronary artery of native heart without angina pectoris  (I48.20) Chronic a-fib (H)  (I10) Benign essential hypertension  (I35.0) Nonrheumatic aortic (valve) stenosis  Comment: ongoing  Plan: vitals daily and prn, BMP follow, continue digoxin 125mcg QD, lasix 20mg QD, KCL 10meq QD, metoprolol tartrate 12.5mg BID, eliquis 5mg BID, plavix 75mg QD    (D47.2) MGUS  Ongoing  Did have oncology appt Dr Hurley  1/31/22 will have a bone marrow biopsy coming up due to increase in monoclonal peak to evaluate for Multiple myeloma          Electronically signed by: Tonya Lynn Haase, APRN CNP           Sincerely,        Tonya Lynn Haase, APRN CNP

## 2022-02-17 NOTE — PROGRESS NOTES
"[facility] GERIATRICS    Chief Complaint   Patient presents with     Nursing Home Acute     HPI:  Efrem Ramos is a 76 year old  (1945), who is being seen today for an episodic care visit at: [facility] ([facility]) [25]. Today's concern is:   Vertigo: today patient states vertigo is ongoing and really hasn't improved, c/o vertigo all of the time, no change with position, therapy states gait is unsteady due to dizziness, he is requiring SBA walking up to 150 feet using a RW  IPF: patient has HIGH but no SOB at rest, denies cough or congestion, SaO2 94-98% on room air  CAD/PAF/AS: BP 99/67, 96/65, 100/67 with HR 90's denies CP, palpitations, SOB    Allergies, and PMH/PSH reviewed in [facility] today.  REVIEW OF SYSTEMS:  10 point ROS of systems including Constitutional, Eyes, Respiratory, Cardiovascular, Gastroenterology, Genitourinary, Integumentary, Musculoskeletal, Psychiatric were all negative except for pertinent positives noted in my HPI.    Objective:   BP 96/65   Pulse 94   Temp 97.4  F (36.3  C)   Resp 16   Ht 1.727 m (5' 8\")   Wt 61 kg (134 lb 6.4 oz)   SpO2 97%   BMI 20.44 kg/m    GENERAL APPEARANCE:  Alert, in no distress  ENT:  Mouth and posterior oropharynx normal, moist mucous membranes, [place]  EYES:  EOM, conjunctivae, lids, pupils and irises normal, PERRL  RESP:  respiratory effort and palpation of chest normal, lungs clear to auscultation , no respiratory distress, diminished breath sounds bases bilaterally  CV:  Palpation and auscultation of heart done , regular rate and rhythm, no murmur, rub, or gallop, no edema  ABDOMEN:  normal bowel sounds, soft, nontender, no hepatosplenomegaly or other masses  M/S:   sitting up in chair, able to move all 4 extremities  SKIN:  Inspection of skin and subcutaneous tissue baseline  NEURO:   speech wnl    Recent labs in [facility] reviewed by me today.     Assessment/Plan:  (R42) Vertigo  Acute/ongoing:  meclizine to 25mg BID 8am and noon and 25mg q 6 hours " prn  Make appt with neurology for c/o central vertigo        (J84.112) IPF (idiopathic pulmonary fibrosis) (H)  Comment: acute/ongoing  Plan: monitor SaO2 at rest and with activity, levaquin finished,  budesonide taper at 3mg QD finished  WBC 10.6 on 2/3/22  Last CXR 1/31/22     I25.10) Coronary artery disease involving native coronary artery of native heart without angina pectoris  (I48.20) Chronic a-fib (H)  (I10) Benign essential hypertension  (I35.0) Nonrheumatic aortic (valve) stenosis  Comment: ongoing  Plan: vitals daily and prn, BMP follow, continue digoxin 125mcg QD, lasix 20mg QD, KCL 10meq QD, metoprolol tartrate 12.5mg BID, eliquis 5mg BID, plavix 75mg QD    (D47.2) MGUS  Ongoing  Did have oncology appt Dr Hurley  1/31/22 will have a bone marrow biopsy coming up due to increase in monoclonal peak to evaluate for Multiple myeloma          Electronically signed by: Tonya Lynn Haase, APRN CNP

## 2022-02-21 NOTE — LETTER
"    2/21/2022        RE: Efrem Ramos  [address] [address]        Vertigo: today patient states vertigo is ongoing and really hasn't improved, c/o vertigo all of the time, no change with position, therapy states gait is unsteady due to dizziness, he is requiring SBA walking up to 150 feet using a RW  IPF: patient has HIGH but no SOB at rest, denies cough or congestion, SaO2 94-98% on room air  CAD/PAF/AS: BP 99/67, 96/65, 100/67 with HR 90's denies CP, palpitations, SOB   [facility]    Chief Complaint   Patient presents with     Nursing Home Acute     HPI:  Efrem Ramos is a 76 year old  (1945), who is being seen today for an episodic care visit at: [facility]) [25]. Today's concern is:   Vertigo: vertigo ongoing, c/o vertigo at all times,  therapy states gait is unsteady due to dizziness, he is requiring SBA walking up to 220 feet using a RW independent with no c/o dizziness when walking per therapy on 2/18/22  IPF: patient has HIGH but no SOB at rest, denies cough or congestion, SaO2 94-98% on room air  CAD/PAF/AS: BP 93/62, 95/57, 99/64 HR in 80's  denies CP, palpitations, SOB     Allergies, and PMH/PSH reviewed in [facility] today.  REVIEW OF SYSTEMS:  10 point ROS of systems including Constitutional, Eyes, Respiratory, Cardiovascular, Gastroenterology, Genitourinary, Integumentary, Musculoskeletal, Psychiatric were all negative except for pertinent positives noted in my HPI.    Objective:   BP 93/62   Pulse 86   Temp 97.6  F (36.4  C)   Resp 16   Ht 1.727 m (5' 8\")   Wt 59.4 kg (131 lb)   SpO2 95%   BMI 19.92 kg/m    GENERAL APPEARANCE:  Alert, in no distress  ENT:  Mouth and posterior oropharynx normal, moist mucous membranes, [place]  EYES:  EOM, conjunctivae, lids, pupils and irises normal, PERRL  RESP:  respiratory effort and palpation of chest normal, lungs clear to auscultation , no respiratory distress, diminished breath sounds bases " bilaterally  CV:  Palpation and auscultation of heart done , regular rate and rhythm, no murmur, rub, or gallop, no edema  ABDOMEN:  normal bowel sounds, soft, nontender, no hepatosplenomegaly or other masses  M/S:   resting in bed, able to move all 4 extremities  SKIN:  Inspection of skin and subcutaneous tissue baseline  NEURO:   speech wnl    Recent labs in [facility] reviewed by me today.     Assessment/Plan:  (R42) Vertigo  Acute/ongoing:  meclizine to 25mg BID 8am and noon and 25mg q 6 hours prn  Make appt with neurology for c/o central vertigo     (J84.112) IPF (idiopathic pulmonary fibrosis) (H)  Comment: acute/ongoing  Plan: monitor SaO2 at rest and with activity, levaquin finished,  budesonide taper at 3mg QD finished  WBC 10.6 on 2/3/22  Last CXR 1/31/22      I25.10) Coronary artery disease involving native coronary artery of native heart without angina pectoris  (I48.20) Chronic a-fib (H)  (I10) Benign essential hypertension  (I35.0) Nonrheumatic aortic (valve) stenosis  Comment: ongoing  Plan: vitals daily and prn, BMP follow, continue digoxin 125mcg QD, DC lasix and KCL, change metoprolol tartrate to 6.25mg BID hold for SBP < 90, eliquis 5mg BID, plavix 75mg QD     (D47.2) MGUS  Ongoing  Did have oncology appt Dr Hurley  1/31/22 will have a bone marrow biopsy coming up due to increase in monoclonal peak to evaluate for Multiple myeloma    Orders:  DC lasix and KCL  Change metoprolol to 6.25mg BID    Electronically signed by: Tonya Lynn Haase, APRN CNP           Sincerely,        Tonya Lynn Haase, APRN CNP

## 2022-02-21 NOTE — PROGRESS NOTES
"Vertigo: today patient states vertigo is ongoing and really hasn't improved, c/o vertigo all of the time, no change with position, therapy states gait is unsteady due to dizziness, he is requiring SBA walking up to 150 feet using a RW  IPF: patient has HIGH but no SOB at rest, denies cough or congestion, SaO2 94-98% on room air  CAD/PAF/AS: BP 99/67, 96/65, 100/67 with HR 90's denies CP, palpitations, SOB   [facility] GERIATRICS    Chief Complaint   Patient presents with     Nursing Home Acute     HPI:  Efrem Ramos is a 76 year old  (1945), who is being seen today for an episodic care visit at: [facility]) [25]. Today's concern is:   Vertigo: vertigo ongoing, c/o vertigo at all times,  therapy states gait is unsteady due to dizziness, he is requiring SBA walking up to 220 feet using a RW independent with no c/o dizziness when walking per therapy on 2/18/22  IPF: patient has HIGH but no SOB at rest, denies cough or congestion, SaO2 94-98% on room air  CAD/PAF/AS: BP 93/62, 95/57, 99/64 HR in 80's  denies CP, palpitations, SOB     Allergies, and PMH/PSH reviewed in [facility] today.  REVIEW OF SYSTEMS:  10 point ROS of systems including Constitutional, Eyes, Respiratory, Cardiovascular, Gastroenterology, Genitourinary, Integumentary, Musculoskeletal, Psychiatric were all negative except for pertinent positives noted in my HPI.    Objective:   BP 93/62   Pulse 86   Temp 97.6  F (36.4  C)   Resp 16   Ht 1.727 m (5' 8\")   Wt 59.4 kg (131 lb)   SpO2 95%   BMI 19.92 kg/m    GENERAL APPEARANCE:  Alert, in no distress  ENT:  Mouth and posterior oropharynx normal, moist mucous membranes, [place]  EYES:  EOM, conjunctivae, lids, pupils and irises normal, PERRL  RESP:  respiratory effort and palpation of chest normal, lungs clear to auscultation , no respiratory distress, diminished breath sounds bases bilaterally  CV:  Palpation and auscultation of heart done , regular rate and rhythm, no murmur, rub, or " gallop, no edema  ABDOMEN:  normal bowel sounds, soft, nontender, no hepatosplenomegaly or other masses  M/S:   resting in bed, able to move all 4 extremities  SKIN:  Inspection of skin and subcutaneous tissue baseline  NEURO:   speech wnl    Recent labs in [facility] reviewed by me today.     Assessment/Plan:  (R42) Vertigo  Acute/ongoing:  meclizine to 25mg BID 8am and noon and 25mg q 6 hours prn  Make appt with neurology for c/o central vertigo     (J84.112) IPF (idiopathic pulmonary fibrosis) (H)  Comment: acute/ongoing  Plan: monitor SaO2 at rest and with activity, levaquin finished,  budesonide taper at 3mg QD finished  WBC 10.6 on 2/3/22  Last CXR 1/31/22      I25.10) Coronary artery disease involving native coronary artery of native heart without angina pectoris  (I48.20) Chronic a-fib (H)  (I10) Benign essential hypertension  (I35.0) Nonrheumatic aortic (valve) stenosis  Comment: ongoing  Plan: vitals daily and prn, BMP follow, continue digoxin 125mcg QD, DC lasix and KCL, change metoprolol tartrate to 6.25mg BID hold for SBP < 90, eliquis 5mg BID, plavix 75mg QD     (D47.2) MGUS  Ongoing  Did have oncology appt Dr Hurley  1/31/22 will have a bone marrow biopsy coming up due to increase in monoclonal peak to evaluate for Multiple myeloma    Orders:  DC lasix and KCL  Change metoprolol to 6.25mg BID    Electronically signed by: Tonya Lynn Haase, APRN CNP

## 2022-02-21 NOTE — H&P (VIEW-ONLY)
"Vertigo: today patient states vertigo is ongoing and really hasn't improved, c/o vertigo all of the time, no change with position, therapy states gait is unsteady due to dizziness, he is requiring SBA walking up to 150 feet using a RW  IPF: patient has HIGH but no SOB at rest, denies cough or congestion, SaO2 94-98% on room air  CAD/PAF/AS: BP 99/67, 96/65, 100/67 with HR 90's denies CP, palpitations, SOB   [facility] GERIATRICS    Chief Complaint   Patient presents with     Nursing Home Acute     HPI:  Efrem Ramos is a 76 year old  (1945), who is being seen today for an episodic care visit at: [facility]) [25]. Today's concern is:   Vertigo: vertigo ongoing, c/o vertigo at all times,  therapy states gait is unsteady due to dizziness, he is requiring SBA walking up to 220 feet using a RW independent with no c/o dizziness when walking per therapy on 2/18/22  IPF: patient has HIGH but no SOB at rest, denies cough or congestion, SaO2 94-98% on room air  CAD/PAF/AS: BP 93/62, 95/57, 99/64 HR in 80's  denies CP, palpitations, SOB     Allergies, and PMH/PSH reviewed in [facility] today.  REVIEW OF SYSTEMS:  10 point ROS of systems including Constitutional, Eyes, Respiratory, Cardiovascular, Gastroenterology, Genitourinary, Integumentary, Musculoskeletal, Psychiatric were all negative except for pertinent positives noted in my HPI.    Objective:   BP 93/62   Pulse 86   Temp 97.6  F (36.4  C)   Resp 16   Ht 1.727 m (5' 8\")   Wt 59.4 kg (131 lb)   SpO2 95%   BMI 19.92 kg/m    GENERAL APPEARANCE:  Alert, in no distress  ENT:  Mouth and posterior oropharynx normal, moist mucous membranes, [place]  EYES:  EOM, conjunctivae, lids, pupils and irises normal, PERRL  RESP:  respiratory effort and palpation of chest normal, lungs clear to auscultation , no respiratory distress, diminished breath sounds bases bilaterally  CV:  Palpation and auscultation of heart done , regular rate and rhythm, no murmur, rub, or " gallop, no edema  ABDOMEN:  normal bowel sounds, soft, nontender, no hepatosplenomegaly or other masses  M/S:   resting in bed, able to move all 4 extremities  SKIN:  Inspection of skin and subcutaneous tissue baseline  NEURO:   speech wnl    Recent labs in [facility] reviewed by me today.     Assessment/Plan:  (R42) Vertigo  Acute/ongoing:  meclizine to 25mg BID 8am and noon and 25mg q 6 hours prn  Make appt with neurology for c/o central vertigo     (J84.112) IPF (idiopathic pulmonary fibrosis) (H)  Comment: acute/ongoing  Plan: monitor SaO2 at rest and with activity, levaquin finished,  budesonide taper at 3mg QD finished  WBC 10.6 on 2/3/22  Last CXR 1/31/22      I25.10) Coronary artery disease involving native coronary artery of native heart without angina pectoris  (I48.20) Chronic a-fib (H)  (I10) Benign essential hypertension  (I35.0) Nonrheumatic aortic (valve) stenosis  Comment: ongoing  Plan: vitals daily and prn, BMP follow, continue digoxin 125mcg QD, DC lasix and KCL, change metoprolol tartrate to 6.25mg BID hold for SBP < 90, eliquis 5mg BID, plavix 75mg QD     (D47.2) MGUS  Ongoing  Did have oncology appt Dr Hurley  1/31/22 will have a bone marrow biopsy coming up due to increase in monoclonal peak to evaluate for Multiple myeloma    Orders:  DC lasix and KCL  Change metoprolol to 6.25mg BID    Electronically signed by: Tonya Lynn Haase, APRN CNP

## 2022-02-23 NOTE — ANESTHESIA POSTPROCEDURE EVALUATION
Patient: Efrem Ramos    Procedure: Procedure(s):  BIOPSY, BONE MARROW       Anesthesia Type:  MAC    Note:  Disposition: Outpatient   Postop Pain Control: Uneventful            Sign Out: Well controlled pain   PONV: No   Neuro/Psych: Uneventful            Sign Out: Acceptable/Baseline neuro status   Airway/Respiratory: Uneventful            Sign Out: Acceptable/Baseline resp. status   CV/Hemodynamics: Uneventful            Sign Out: Acceptable CV status   Other NRE: NONE   DID A NON-ROUTINE EVENT OCCUR? No           Last vitals:  Vitals Value Taken Time   BP     Temp     Pulse     Resp     SpO2         Electronically Signed By: Job Garcia MD  February 23, 2022  1:50 PM

## 2022-02-23 NOTE — ANESTHESIA CARE TRANSFER NOTE
Patient: Efrem Ramos    Procedure: Procedure(s):  BIOPSY, BONE MARROW       Diagnosis: Monoclonal paraproteinemia [D47.2]  Diagnosis Additional Information: No value filed.    Anesthesia Type:   MAC     Note:    Oropharynx: oropharynx clear of all foreign objects and spontaneously breathing  Level of Consciousness: awake  Oxygen Supplementation: nasal cannula  Level of Supplemental Oxygen (L/min / FiO2): 3  Independent Airway: airway patency satisfactory and stable  Dentition: dentition unchanged  Vital Signs Stable: post-procedure vital signs reviewed and stable  Report to RN Given: handoff report given  Patient transferred to: PACU    Handoff Report: Identifed the Patient, Identified the Reponsible Provider, Reviewed the pertinent medical history, Discussed the surgical course, Reviewed Intra-OP anesthesia mangement and issues during anesthesia, Set expectations for post-procedure period and Allowed opportunity for questions and acknowledgement of understanding      Vitals:  Vitals Value Taken Time   BP 99/59 02/23/22 1151   Temp     Pulse 91 02/23/22 1154   Resp 21 02/23/22 1154   SpO2 96 % 02/23/22 1154   Vitals shown include unvalidated device data.    Electronically Signed By: FABI Mccray CRNA  February 23, 2022  11:55 AM

## 2022-02-23 NOTE — OR NURSING
Bone marrow dressing clean, dry and intact.  Discharge instructions sent with son to give to [facility] staff.

## 2022-02-23 NOTE — ANESTHESIA PREPROCEDURE EVALUATION
Anesthesia Pre-Procedure Evaluation    Patient: Efrem Ramos   MRN: [id number] : 1945        Procedure : Procedure(s):  BIOPSY, BONE MARROW          Past Medical History:   Diagnosis Date     Atrial fibrillation (H)     amiodarone therapy discontinued due to pulmonary toxicity     Atrial flutter (H)      Benign essential hypertension 2018     Cancer (H) vocal cord     Carpal tunnel syndrome     abstracted 7/3/02.     Coronary artery disease involving native coronary artery of native heart without angina pectoris 2020    Cath 2020: patent stents; Cath 2020: ISABEL x4 to RCA; Cath 3/2020: 1 vessel disease; Cath 2017: moderate 2 vessel disease     CVA (cerebral infarction) 2015     Demyelinating disease of central nervous system, unspecified (H)     abstracted 7/3/02.     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     Pneumonia 2016     PVD (peripheral vascular disease) (H)      Redundant colon     needs CT colonography     Shingles      SKIN DISORDERS NEC 3/15/2005     Sleep apnea      Sleep apnea      SVT (supraventricular tachycardia) (H)       Past Surgical History:   Procedure Laterality Date     BIOPSY  brain      BONE MARROW BIOPSY, BONE SPECIMEN, NEEDLE/TROCAR N/A 2017    Procedure: BIOPSY BONE MARROW;  UNILATERAL BONE MARROW BIOPSY (CONSCIOUS SEDATION) ;   Surgeon: Jamie Gonzales MD;  Location:  GI     CARDIAC CATHERIZATION  09/05/2017    2V CAD, IFR of RCA 0.95     CV CORONARY ANGIOGRAM N/A 3/23/2020    Procedure: Coronary Angiogram and right heart cath;  Surgeon: Gino Ferrer MD;  Location:  HEART CARDIAC CATH LAB     CV HEART CATHETERIZATION WITH POSSIBLE INTERVENTION N/A 5/28/2020    Procedure: Heart Catheterization with Possible Intervention;  Surgeon: Larry Chawla MD;  Location:  HEART CARDIAC CATH LAB     CV HEART CATHETERIZATION WITH POSSIBLE INTERVENTION N/A 5/18/2020    Procedure: Heart Catheterization with Possible Intervention;  Surgeon: Gaurang Swenson MD;  Location:  HEART CARDIAC CATH LAB     CV INSTANTANEOUS WAVE-FREE RATIO N/A 3/23/2020    Procedure: Instantaneous Wave-Free Ratio;  Surgeon: Gino Ferrer MD;  Location:  HEART CARDIAC CATH LAB     CV PCI ATHERECTOMY ORBITAL N/A 5/18/2020    Procedure: Percutaneous Coronary Intervention Atherectomy Rotational;  Surgeon: Gaurang Swenson MD;  Location:  HEART CARDIAC CATH LAB     CV PCI STENT DRUG ELUTING N/A 5/18/2020    Procedure: Percutaneous Coronary Intervention Stent Drug Eluting;  Surgeon: Gaurang Swenson MD;  Location:  HEART CARDIAC CATH LAB     CV RIGHT HEART CATH MEASUREMENTS RECORDED N/A 5/28/2020    Procedure: Right Heart Cath;  Surgeon: Larry Chawla MD;  Location:  HEART CARDIAC CATH LAB     CV TEMPORARY PACEMAKER INSERTION N/A 5/18/2020    Procedure: Temporary Pacemaker Insertion;  Surgeon: Gaurang Swenson MD;  Location:  HEART CARDIAC CATH LAB     ESOPHAGOSCOPY, GASTROSCOPY, DUODENOSCOPY (EGD), COMBINED N/A 9/8/2018    Procedure: COMBINED ESOPHAGOSCOPY, GASTROSCOPY, DUODENOSCOPY (EGD), BIOPSY SINGLE OR MULTIPLE;;  Surgeon: Xander Marie MD;  Location:  GI     HEAD & NECK SURGERY       ORTHOPEDIC SURGERY      right arm ulna reset after injury     THORACOSCOPIC WEDGE RESECTION LUNG Right 8/2/2016     Procedure: THORACOSCOPIC WEDGE RESECTION LUNG;  Surgeon: Abdelrahman Noriega MD;  Location:  OR     [facility] NONSPECIFIC PROCEDURE  as a child    T & A. abstracted 7/3/02.     ZZC NONSPECIFIC PROCEDURE  early    CTR. abstracted 7/3/02.      Allergies   Allergen Reactions     Sulfa Drugs Difficulty breathing, Swelling and Hives     Adhesive Tape Blisters     Amiodarone Other (See Comments)     Developed pleural effusion     Penicillins Other (See Comments)     Reaction occurred as a child  Other reaction(s): Hives      Social History     Tobacco Use     Smoking status: Former Smoker     Packs/day: 1.00     Years: 30.00     Pack years: 30.00     Types: Cigarettes     Quit date: 2013     Years since quittin.5     Smokeless tobacco: Never Used   Substance Use Topics     Alcohol use: Not Currently     Alcohol/week: 42.0 standard drinks     Types: 42 Standard drinks or equivalent per week      Wt Readings from Last 1 Encounters:   22 59.4 kg (131 lb)        Anesthesia Evaluation            ROS/MED HX  ENT/Pulmonary: Comment: Idiopathic pulmonary fibrosis    (+) sleep apnea,     Neurologic:     (+) CVA,     Cardiovascular:     (+) hypertension--CAD ---HIGH. dysrhythmias, a-fib, valvular problems/murmurs type: AS, AI and MR Previous cardiac testing   Echo: Date: 21 Results:  Complete Echo Adult.     ______________________________________________________________________________  Interpretation Summary     1. Rhythm is atrial fibrillation.  2. Severe low-flow low gradient, preserved LVEF, aortic valve stenosis. The  valve is functionally bicuspid and calcified. Mean systolic gradient 23-25  mmHg, valve area 0.8 cmÂ , dimensionless index 0.25.  3. Severe tricuspid valve regurgitation. Mechanism functional.  4. Mitral annular calcification with moderate stenosis and mild-moderate  regurgitation.  5. Normal left ventricular systolic function. Estimated LVEF 60-70%.  6. Moderately dilated right ventricle  with normal systolic function (TAPSE 2.0  cm, tissue Doppler systolic velocity 11 cm/s).  7. Severe bi-atrial enlargement.  8. Normal inferior vena cava.     Compared to previous study dated 6/3/2021, right ventricular systolic function  has normalized (from moderately decreased) and the inferior vena cava is not  dilated.  Stress Test: Date: Results:    ECG Reviewed: Date: Results:    Cath: Date: Results:      METS/Exercise Tolerance:     Hematologic:       Musculoskeletal:       GI/Hepatic:    (-) GERD and liver disease   Renal/Genitourinary:    (-) renal disease   Endo:    (-) Type II DM and thyroid disease   Psychiatric/Substance Use:       Infectious Disease:       Malignancy:       Other:            Physical Exam    Airway        Mallampati: II   TM distance: > 3 FB   Neck ROM: full   Mouth opening: > 3 cm    Respiratory Devices and Support         Dental  no notable dental history         Cardiovascular          Rhythm and rate: regular and normal     Pulmonary   pulmonary exam normal                OUTSIDE LABS:  CBC:   Lab Results   Component Value Date    WBC 10.7 01/18/2022    WBC 11.0 01/15/2022    HGB 10.3 (L) 01/18/2022    HGB 10.2 (L) 01/15/2022    HCT 32.3 (L) 01/18/2022    HCT 31.8 (L) 01/15/2022     01/18/2022     01/15/2022     BMP:   Lab Results   Component Value Date     01/18/2022     01/17/2022    POTASSIUM 3.6 01/18/2022    POTASSIUM 3.5 01/17/2022    CHLORIDE 104 02/08/2022    CHLORIDE 106 01/28/2022    CO2 26 01/18/2022    CO2 25 01/17/2022    BUN 12 01/18/2022    BUN 11 01/17/2022    CR 0.74 01/18/2022    CR 0.79 01/17/2022    GLC 85 01/18/2022    GLC 81 01/17/2022     COAGS:   Lab Results   Component Value Date    PTT 33 05/18/2020    INR 1.51 (H) 05/18/2020     POC:   Lab Results   Component Value Date    BGM 99 05/19/2020     HEPATIC:   Lab Results   Component Value Date    ALBUMIN 2.8 (L) 10/26/2021    PROTTOTAL 7.7 10/26/2021    ALT 25 10/26/2021    AST 21  10/26/2021    ALKPHOS 118 10/26/2021    BILITOTAL 0.7 10/26/2021     OTHER:   Lab Results   Component Value Date    PH 7.50 (H) 05/28/2020    LACT 1.6 01/11/2022    A1C 5.3 09/04/2015    ULISSES 8.3 (L) 01/18/2022    PHOS 2.6 06/04/2020    MAG 2.0 06/04/2020    LIPASE 294 09/05/2018    TSH 2.58 01/10/2022    T4 0.90 04/06/2016    T3 62 12/18/2015    CRP 36.9 (H) 09/05/2018    SED 17 05/12/2015       Anesthesia Plan    ASA Status:  4   NPO Status:  NPO Appropriate    Anesthesia Type: MAC.     - Reason for MAC: chronic cardiopulmonary disease, straight local not clinically adequate   Induction: N/a.           Consents    Anesthesia Plan(s) and associated risks, benefits, and realistic alternatives discussed. Questions answered and patient/representative(s) expressed understanding.    - Discussed:     - Discussed with:  Patient         Postoperative Care    Pain management: IV analgesics.   PONV prophylaxis: Ondansetron (or other 5HT-3)     Comments:    Other Comments: Esmolol at bedside, phenylephrine prior to propofol administration  Fentanyl and versed as primary anesthetic            Job Garcia MD

## 2022-02-24 NOTE — PROGRESS NOTES
Clinic Care Coordination Contact    Situation: Patient chart reviewed by care coordinator.    Background: Pt was admitted at [facility] from 1/10-1/19 due to Community acquired pneumonia.. Pt was discharged to [facility] TCU for ongoing therapy and recovery. Pt was seen yesterday at [facility] for Bone Marrow Biopsy.    Assessment: Pt returned to TCU.    Plan/Recommendations: CC IVIS will outreach to pt upon discharge to discuss transition home and any needs that arise.    Meka Burgos [facility]  Clinic Care Coordinator  [facility] [place]  [facility]  [phone]  [email or web address]

## 2022-02-28 NOTE — PROGRESS NOTES
02/28/22 1423   General Information                        OP Clinical Swallow Evaluation and VFSS   Type Of Visit Initial   Start Of Care Date 02/28/22   Referring Physician Dr. Lopez, Tonya Lynne Haase CNP   Medical Diagnosis Dysphagia   Onset Of Illness/injury Or Date Of Surgery 01/05/22   Respiratory Status Room air   Living Environment   (TCU, discharge home soon planned)   Patient/family Goals Pt and son did not state specific goal, but son stated pt has been preferring pureed/smooth foods.   General Information Comments Dysphagia hx with VFSS completed 1/12/22 - silent aspiration of thin, deep penetration with mildly thick liquids, residue reported; Puree and mildly thick liquids were recommended with a supraglottic swallow, small meals.    Pt has been on minced and oist and thickened liquids at TCU.  Pt unable to recall specific strategies he has been consistently using.  Pt reports trouble with pills and phlegm in throat when he wakes up.  Pt has had pain with chewing due to teeth/gum issues per son's report.  Prior to 1/2022, pt was eating soft food at home.  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    No current respiratory issues reported.   Limited puree to solid trials presented due to length of oral phase and pharyngeal residue.  Current deficits appear similar to findings in 1/2022.   Fall Risk Screen   Fall screen comments See radiology staff documentation.    Clinical Swallow Evaluation   Oral Musculature generally intact   Dentition   (No dentures, teeth/gum issues reported)   Additional evaluation(s) completed today Yes  (Clinical: interview, oral motor exam, thin water trials)   Rationale for completing additional evaluation VFSS-to assess pharyngeal phase function and aspiration risk   Clinical Swallow Eval: Thin Liquid Texture Trial   Mode of Presentation, Thin Liquids cup   Volume of Liquid or Food Presented sips x 2    Oral Phase of Swallow WFL   Pharyngeal Phase of Swallow intact   Diagnostic Statement delayed throat clearing/cough   VFSS Eval: Radiology   Radiologist Dr. Brandt   Views Taken left lateral;A/P   Physical Location of Procedure [facility]   VFSS Eval: Thin Liquid Texture Trial   Mode of Presentation, Thin Liquid spoon   Order of Presentation 4-9   Preparatory Phase Poor bolus control   Oral Phase, Thin Liquid Premature pharyngeal entry   Pharyngeal Phase, Thin Liquid Delayed swallow reflex   Rosenbek's Penetration Aspiration Scale: Thin Liquid Trial Results 5 - contrast contacts vocal cords, visible residue remains (penetration)   Diagnostic Statement Weak base of tongue function, decreased epiglottic inversion, weak peristalsis, tight UES, mild-mod pharyngeal residue, flash penetration x 1, silent penetration to the vocal folds x 1, chin tuck did not eliminate penetration to the vocal folds   VFSS Eval: Mildly Thick Liquids    Mode of Presentation spoon;cup   Order of Presentation 1, 2, 3, 15, 16   Preparatory Phase Poor bolus control   Oral Phase Premature pharyngeal entry   Pharyngeal Phase Delayed swallow reflex   Rosenbek's Penetration Aspiration Scale 2 - contrast enters airway, remains above the vocal cords, no residue remains (penetration)   Diagnostic Statement Weak base of tongue function, decreased epiglottic inversion, weak peristalsis, tight UES, mild-mod pharyngeal residue, flash penetration by cup, 2nd swallow cleared some residue; Residue was symmetrical in AP view, bolus traveled on L, esophageal residue noted   VFSS Eval: Moderately Thick Liquids    Mode of Presentation spoon   Order of Presentation 11, 12   Preparatory Phase Poor bolus control   Oral Phase Premature pharyngeal entry   Pharyngeal Phase Delayed swallow reflex   Rosenbek's Penetration Aspiration Scale 2 - contrast enters airway, remains above the vocal cords, no residue remains (penetration)   Diagnostic Statement Weak base of tongue  function, decreased epiglottic inversion, weak peristalsis, tight UES, mod pharyngeal residue, piecemeal swallows,    VFSS Eval: Puree Solid Texture Trial   Mode of Presentation, Puree spoon   Order of Presentation 13   Preparatory Phase Poor bolus control   Oral Phase, Puree Premature pharyngeal entry;Effortful AP movement;Residue in oral cavity   Pharyngeal Phase, Puree Delayed swallow reflex   Rosenbek's Penetration Aspiration Scale: Puree Food Trial Results 1 - no aspiration, contrast does not enter airway   Diagnostic Statement Weak base of tongue function, decreased epiglottic inversion, weak peristalsis, tight UES, mod pharyngeal residue, piecemeal swallows, 2nd swallows difficult to initiate, cleared only some residue   VFSS Eval: Soft & Bite Sized   Mode of Presentation spoon   Order of Presentation 14   Preparatory Phase Poor bolus control   Oral Phase Effortful AP movement;Premature pharyngeal entry;Residue in oral cavity   Pharyngeal Phase Delayed swallow reflex   Rosenbek's Penetration Aspiration Scale 1 - no aspiration, contrast does not enter airway   Diagnostic Statement Weak base of tongue function, decreased epiglottic inversion, weak peristalsis, tight UES, mod pharyngeal residue, piecemeal swallows, 2nd swallows difficult to initiate, cleared only some residue, alternating to mildly thick by tsp cleared some residue   VFSS Eval: Regular Texture Trial (Solid)   Order of Presentation Did not test given pharyngeal residue on previous trials   Swallow Compensations   Swallow Compensations Reduce amounts;Pacing;Multiple swallow   Educational Assessment   Barriers to Learning Cognitive   Preferred Learning Style Listening;Reading;Pictures/video  (Handouts provided)   Esophageal Phase of Swallow   Patient reports or presents with symptoms of esophageal dysphagia Yes   Esophageal comments Hx of esophageal dysphagia, esophageal residue noted in AP view, tight UES with slight reflux noted as well   Swallow  Eval: Clinical Impressions   Skilled Criteria for Therapy Intervention Skilled criteria met.  Treatment indicated.   Functional Assessment Scale (FAS) 2   Dysphagia Outcome Severity Scale (GONZALEZ) Level 2 - GONZALEZ   Treatment Diagnosis Moderate-severe oral-pharyngeal dysphagia   Diet texture recommendations Minced & Moist diet (level 5);Mildly thick liquids (level 2)  (Sit at 90, stay up for 60 minutes, cough-swallow after meals)   Recommended Feeding/Eating Techniques small sips/bites;no straws;alternate between small bites and sips of food/liquid;hard swallow w/ each bite or sip  (multiple swallows, swallow-cough-swallow per bite/sip by tsp)   Rehab Potential fair, will monitor progress closely   Therapy Frequency   (per [facility])   Risks and Benefits of Treatment have been explained. Yes   Patient, family and/or staff in agreement with Plan of Care Yes   Clinical Impression Comments Pt presents with moderate-severe oral-pharyngeal dysphagia per today's VFSS results.  Deficits include poor AP movement, weak base of tongue function, decreased epiglottic inversion, weak peristalsis, delayed swallows, and tight UES.  Deficits resulted in min-mod to mod pharyngeal residue, piecemeal swallows, flash penetration of mildly thick by cup and mod thick by tsp and silent penetration to the vocal folds with thin by tsp with and without a chin tuck.  Recommend a continued minced and moist diet and mildly thick liquids by tsp/very small sips by cup with precautions including: sit at 90 degrees, stay up for 60 minutes after eating, swallow-cough-swallow per bite/sip, extra hard swallows during and after meal, smaller meals at a sitting, crush meds with puree, use of pureed foods if preferred/easier to swallow.  Use cough-swallow after meals to clear residue and/or clear wet voicing if noted.  Recommend continued SLP swallow Tx at [facility] and/or [facility] SLP to assess diet tolerance, train strategies, and trial ice chips/thin water on a free  water protocol with a supraglottic swallow technique if desired per pt/family.  Recommend close monitoring of pt's lung status and signs of aspiration with diet.  Nutrition/hydration status monitoring also recommended given current level of dysphagia and need for a restricted diet.  Educated pt/family after study.  Son verbalized understanding.   Will to defer to pt, family, MD if change of POC/wishes for po intake change for an advanced diet despite increased aspiration risk.     Total Session Time   SLP Eval: oral/pharyngeal swallow function, clinical minutes (27422) 20   SLP Eval: VideoFluoroscopic Swallow function Minutes (74078) 25   Total Evaluation Time 45

## 2022-03-01 NOTE — PROGRESS NOTES
[facility] GERIATRICS    Chief Complaint   Patient presents with     Nursing Home Acute     HPI:  Efrem Ramos is a 76 year old  (1945), who is being seen today for an episodic care visit at: [facility]) [25]. Today's concern is:   Vertigo: vertigo ongoing, c/o vertigo at all times,  therapy states gait is unsteady due to dizziness, he is requiring SBA walking up to 220 feet using a RW, needing a w/c for independent mobility  IPF: patient has HIGH but no SOB at rest, denies cough or congestion, SaO2 91-97% on room air  CAD/PAF/AS: BP 81/53, 97/67, 91/68 with HR 80's denies  CP, palpitations, SOB  MGUS: bone marrow biopsy, awaiting results  Dysphagia: video swallow study done 2/28/22 found to have continued moderate to severe dysphagia, continue minced and moist with mildly thick liquids  Allergies, and PMH/PSH reviewed in [facility] today.  REVIEW OF SYSTEMS:  10 point ROS of systems including Constitutional, Eyes, Respiratory, Cardiovascular, Gastroenterology, Genitourinary, Integumentary, Musculoskeletal, Psychiatric were all negative except for pertinent positives noted in my HPI.    Objective:   BP (!) 81/53   Pulse 61   Temp 97.3  F (36.3  C)   Resp 16   SpO2 90%   GENERAL APPEARANCE:  Alert, in no distress  ENT:  Mouth and posterior oropharynx normal, moist mucous membranes, Telida  EYES:  EOM, conjunctivae, lids, pupils and irises normal, PERRL  RESP:  respiratory effort and palpation of chest normal, lungs clear to auscultation , no respiratory distress, diminished breath sounds bases bilaterally  CV:  Palpation and auscultation of heart done , regular rate and rhythm, no murmur, rub, or gallop, no edema  ABDOMEN:  normal bowel sounds, soft, nontender, no hepatosplenomegaly or other masses  M/S:   resting in bed, able to move all 4 extremities  SKIN:  Inspection of skin and subcutaneous tissue baseline  NEURO:   speech wnl    Recent labs in [facility] reviewed by me today.      Assessment/Plan:  (R42) Vertigo  Acute/ongoing:  meclizine to 25mg BID 8am and noon and 25mg q 6 hours prn   appt with neurology for c/o central vertigo     (J84.112) IPF (idiopathic pulmonary fibrosis) (H)  Comment: acute/ongoing  Plan: monitor SaO2 at rest and with activity, levaquin finished,  budesonide taper at 3mg QD finished  WBC 10.6 on 2/3/22  Last CXR 1/31/22      I25.10) Coronary artery disease involving native coronary artery of native heart without angina pectoris  (I48.20) Chronic a-fib (H)  (I10) Benign essential hypertension  (I35.0) Nonrheumatic aortic (valve) stenosis  Comment: ongoing  Plan: vitals daily and prn, BMP follow, continue digoxin 125mcg QD, DC lasix and KCL  DC metoprolol 3/1/22 due to hypotension  Continue eliquis 5mg BID, plavix 75mg QD     (D47.2) MGUS  Ongoing  Followed by oncology Dr Hurley bone marrow biopsy 2/23/22 due to increase in monoclonal peak to evaluate for Multiple myeloma  Waiting for biopsy results    Orders:  DC metoprolol      Wheelchair Documentation  Size: 18 x 16  Corresponding cushion: Yes: comfort curve  Standard foot rests: Yes  Elevating leg rests: No  Arm rests: Yes: full length  Lap tray: No  Dose the patient use oxygen? No   Is the patient able to propel wheelchair? Yes If no why not? n/a And is there someone who can?n/a  1. The patient has mobility limitations that impairs their ability to participate in one or more mobility related activities: Toileting, Feeding, Grooming and Bathing.  The wheelchair is suitable and necessary for use in the patient's home.  2. The patient's mobility limitations cannot be safely resolved by using a cane/walker:Yes    Reason why a cane or walker will not meet the patient's needs. (ie: balance, tolerance, level of assistance) poor balance due to vertigo  3. The patients home has adequate access to use a manual wheelchair:Yes  4. The use of a manual wheelchair on a regular basis will improve the patients ability to  participate in mobility related ADL's at home:Yes  5. The patient is willing to use a manual wheelchair at home:Yes  6. The patient has adequate upper body strength and the mental capability to safely use a manual wheelchair and/or has a caregiver that is able to assist: Yes  7. Does the patient have a lower extremity injury or edema?Yes  Reason for Type of Wheelchair  Patient weight: 0 lbs 0 oz        Electronically signed by: Tonya Lynn Haase, APRN CNP

## 2022-03-01 NOTE — PROCEDURES
[facility] GERIATRICS    Chief Complaint   Patient presents with     Nursing Home Acute     HPI:  Efrem Ramos is a 76 year old  (1945), who is being seen today for an episodic care visit at: No question data found.. Today's concern is: ***    Allergies, and PMH/PSH reviewed in [facility] today.  REVIEW OF SYSTEMS:  {oewlel81:866505}    Objective:   There were no vitals taken for this visit.  {correction physical exam :266136}    {fgslab:050809}    Assessment/Plan:  {FGS DX2:996007}    Orders:  {fgsorders:[id number]}  ***    Electronically signed by: Bessie Worrell MA ***

## 2022-03-03 NOTE — PROGRESS NOTES
[facility] GERIATRICS DISCHARGE SUMMARY  PATIENT'S NAME: Efrem Ramos  YOB: 1945  MEDICAL RECORD NUMBER:  [id number]  [facility]    PRIMARY CARE PROVIDER AND CLINIC RESPONSIBLE AFTER TRANSFER:   Danny Paige MD, MD, [address] / [place] [address]    [facility] Provider     Transferring providers: Tonya Lynn Haase, APRN CNP, Dr. Perry Lopez MD  Recent Hospitalization/ED:  [facility] stay 1/10/22 to 1/19/22.  Date of SNF Admission: January 19, 2022  Date of SNF (anticipated) Discharge: March 03, 2022  Discharged to: previous independent home  Cognitive Scores: BIMS: 15/15  Physical Function: Ambulating 200 ft with RW SBA  DME: Walker and Wheelchair for independent mobility    CODE STATUS/ADVANCE DIRECTIVES DISCUSSION:  Prior   ALLERGIES: Sulfa drugs, Adhesive tape, Amiodarone, and Penicillins    NURSING FACILITY COURSE   Medication Changes/Rationale:     DC metoprolol due to hypotension    Started on meclizine and increased to 25mg TID due to vertigo    Summary of nursing facility stay:   Patient progressed to walking up to 200 feet using a RW, needing SBA due to vertigo and unsteady gait, vertigo started during TCU stay, therapy did vestibular testing which was negative, patient will f/u with neurology as OP, patient also SBA with ADL's and toileting, will DC to home where he lives with his Son, sounds like the home has a lot of clutter, possible hoarding situation, the Son was asked to clear a path to and from the bedroom and bathroom for patient to use RW and w/c. Therapy is not recommending patient return to home due to concerns for safety with clutter issues and high risk for falls. Patient DC to home with home PT, OT, RN, HHA and  through [facility]. Our  will be filing a vulnerable adult report with the State .     Discharge Medications:    Current  Outpatient Medications   Medication Sig Dispense Refill     acetaminophen (TYLENOL) 325 MG tablet Take 325-650 mg by mouth every 6 hours as needed for mild pain        apixaban ANTICOAGULANT (ELIQUIS) 5 MG tablet Take 1 tablet (5 mg) by mouth 2 times daily 180 tablet 3     atorvastatin (LIPITOR) 40 MG tablet Take 1 tablet (40 mg) by mouth daily 90 tablet 3     budesonide (ENTOCORT EC) 3 MG EC capsule 9 mg daily x 6 weeks, then 6 mg daily x 2 weeks, then 3 mg daily x 2 weeks 126 capsule 0     calcium carbonate 600 mg-vitamin D 400 units (CALTRATE) 600-400 MG-UNIT per tablet Take 1 tablet by mouth 2 times daily        clopidogrel (PLAVIX) 75 MG tablet Take 1 tablet (75 mg) by mouth daily 90 tablet 1     digoxin (LANOXIN) 125 MCG tablet Take 1 tablet (125 mcg) by mouth daily 90 tablet 3     gabapentin (NEURONTIN) 300 MG capsule TAKE 2 CAPSULES BY MOUTH EVERY EVENING 180 capsule 3     meclizine (ANTIVERT) 25 MG tablet Take 25 mg by mouth every 6 hours as needed for dizziness       meclizine (ANTIVERT) 25 MG tablet Take 1 tablet (25 mg) by mouth 2 times daily       melatonin 1 MG TABS tablet Take 1 mg by mouth At Bedtime ALSO TAKING 1 MG PO HS PRN FOR INSOMNIA       mirtazapine (REMERON) 15 MG tablet Take 1 tablet (15 mg) by mouth At Bedtime 90 tablet 3     multivitamin (OCUVITE) TABS Take 1 tablet by mouth daily        senna-docusate (SENOKOT-S/PERICOLACE) 8.6-50 MG tablet Take 2 tablets by mouth 2 times daily as needed for constipation       senna-docusate (SENOKOT-S/PERICOLACE) 8.6-50 MG tablet Take 2 tablets by mouth 2 times daily (Patient not taking: Reported on 3/3/2022) 120 tablet 3          Controlled medications:   not applicable/none     Past Medical History:   Past Medical History:   Diagnosis Date     Atrial fibrillation (H)     amiodarone therapy discontinued due to pulmonary toxicity     Atrial flutter (H)      Benign essential hypertension 11/20/2018     Cancer (H) vocal cord     Carpal tunnel syndrome      "abstracted 7/3/02.     Coronary artery disease involving native coronary artery of native heart without angina pectoris 5/8/2020    Cath 5/28/2020: patent stents; Cath 5/18/2020: ISABEL x4 to RCA; Cath 3/2020: 1 vessel disease; Cath 2017: moderate 2 vessel disease     CVA (cerebral infarction) 5/5/2015     Demyelinating disease of central nervous system, unspecified (H)     abstracted 7/3/02.     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     Pneumonia 8/17/2016     PVD (peripheral vascular disease) (H)      Redundant colon     needs CT colonography     Shingles      SKIN DISORDERS NEC 3/15/2005     Sleep apnea      Sleep apnea      SVT (supraventricular tachycardia) (H)      Physical Exam:   Vitals: BP 97/64   Pulse 85   Temp 97.8  F (36.6  C)   Resp 20   Ht 1.727 m (5' 8\")   Wt 60 kg (132 lb 3.2 oz)   SpO2 95%   BMI 20.10 kg/m    BMI: Body mass index is 20.1 kg/m .  GENERAL APPEARANCE:  Alert, in no distress  ENT:  Mouth and posterior oropharynx normal, moist mucous membranes, [place]  EYES:  EOM, conjunctivae, lids, pupils and irises normal, PERRL  RESP:  respiratory effort and palpation of chest normal, lungs clear to auscultation , no respiratory distress, diminished breath sounds bases bilaterally  CV:  Palpation and auscultation of heart done , regular rate and rhythm, no murmur, rub, or gallop, no " edema  ABDOMEN:  normal bowel sounds, soft, nontender, no hepatosplenomegaly or other masses  M/S:   patient sitting up in w/c, able to move all 4 extremities  SKIN:  Inspection of skin and subcutaneous tissue baseline  NEURO:   speech wnl  PSYCH:  affect and mood normal     SNF labs: Recent labs in [facility] reviewed by me today.      Assessment/Plan:  (R42) Vertigo  Acute/ongoing:  meclizine to 25mg BID 8am and noon and 25mg q 6 hours prn   appt with neurology for c/o central vertigo  appt date is 3/7/22      (J84.112) IPF (idiopathic pulmonary fibrosis) (H)  Comment: acute/ongoing  Plan: levaquin finished,  budesonide taper at 3mg QD finished  WBC 10.6 on 2/3/22  Last CXR 1/31/22      I25.10) Coronary artery disease involving native coronary artery of native heart without angina pectoris  (I48.20) Chronic a-fib (H)  (I10) Benign essential hypertension  (I35.0) Nonrheumatic aortic (valve) stenosis  Comment: ongoing  Plan: continue digoxin 125mcg QD, DC lasix and KCL  DC metoprolol 3/1/22 due to hypotension  Continue eliquis 5mg BID, plavix 75mg QD     (D47.2) MGUS  Ongoing  Followed by oncology Dr Hurley bone marrow biopsy 2/23/22 due to increase in monoclonal peak to evaluate for Multiple myeloma  Waiting for biopsy results  F/u with oncology on 3/10/22       DISCHARGE PLAN:    Follow up labs: No labs orders/due    Medical Follow Up:      Follow up with primary care provider in 1 weeks    Current [facility] scheduled appointments:  Next 5 appointments (look out 90 days)    Mar 10, 2022 11:00 AM  Return Visit with Halie Lawrence DO  [facility] [address], [address]  [facility] [address]  [phone]         Future Appointments   Date Time Provider Department Center   3/7/2022  9:30 AM Danny Paige MD [facility]    3/10/2022 11:00 AM Halie Lawrence DO [facility]         Discharge Services: Home Care:   Occupational Therapy, Physical Therapy, Registered Nurse, Home Health Aide and     Discharge Instructions Verbalized to Patient at Discharge:     None    TOTAL DISCHARGE TIME:   Greater than 30 minutes  Electronically signed by:  Tonya Lynn Haase, APRN CNP

## 2022-03-03 NOTE — LETTER
3/3/2022        RE: Efrem Ramos  [address] [place] [address]        [facility] GERIATRICS DISCHARGE SUMMARY  PATIENT'S NAME: Efrem Ramos  YOB: 1945  MEDICAL RECORD NUMBER:  [id number]  [facility]    PRIMARY CARE PROVIDER AND CLINIC RESPONSIBLE AFTER TRANSFER:   Danny Paige MD, MD, [address] / [place] [address]    [facility] Provider     Transferring providers: Tonya Lynn Haase, APRN CNP, Dr. Perry Lopez MD  Recent Hospitalization/ED:  [facility] stay 1/10/22 to 1/19/22.  Date of SNF Admission: January 19, 2022  Date of SNF (anticipated) Discharge: March 03, 2022  Discharged to: previous independent home  Cognitive Scores: BIMS: 15/15  Physical Function: Ambulating 200 ft with RW SBA  DME: Walker and Wheelchair for independent mobility    CODE STATUS/ADVANCE DIRECTIVES DISCUSSION:  Prior   ALLERGIES: Sulfa drugs, Adhesive tape, Amiodarone, and Penicillins    NURSING FACILITY COURSE   Medication Changes/Rationale:     DC metoprolol due to hypotension    Started on meclizine and increased to 25mg TID due to vertigo    Summary of nursing facility stay:   Patient progressed to walking up to 200 feet using a RW, needing SBA due to vertigo and unsteady gait, vertigo started during TCU stay, therapy did vestibular testing which was negative, patient will f/u with neurology as OP, patient also SBA with ADL's and toileting, will DC to home where he lives with his Son, sounds like the home has a lot of clutter, possible hoarding situation, the Son was asked to clear a path to and from the bedroom and bathroom for patient to use RW and w/c. Therapy is not recommending patient return to home due to concerns for safety with clutter issues and high risk for falls. Patient DC to home with home PT, OT, RN, HHA and  through [facility]. Our  will  be filing a vulnerable adult report with the State .     Discharge Medications:    Current Outpatient Medications   Medication Sig Dispense Refill     acetaminophen (TYLENOL) 325 MG tablet Take 325-650 mg by mouth every 6 hours as needed for mild pain        apixaban ANTICOAGULANT (ELIQUIS) 5 MG tablet Take 1 tablet (5 mg) by mouth 2 times daily 180 tablet 3     atorvastatin (LIPITOR) 40 MG tablet Take 1 tablet (40 mg) by mouth daily 90 tablet 3     budesonide (ENTOCORT EC) 3 MG EC capsule 9 mg daily x 6 weeks, then 6 mg daily x 2 weeks, then 3 mg daily x 2 weeks 126 capsule 0     calcium carbonate 600 mg-vitamin D 400 units (CALTRATE) 600-400 MG-UNIT per tablet Take 1 tablet by mouth 2 times daily        clopidogrel (PLAVIX) 75 MG tablet Take 1 tablet (75 mg) by mouth daily 90 tablet 1     digoxin (LANOXIN) 125 MCG tablet Take 1 tablet (125 mcg) by mouth daily 90 tablet 3     gabapentin (NEURONTIN) 300 MG capsule TAKE 2 CAPSULES BY MOUTH EVERY EVENING 180 capsule 3     meclizine (ANTIVERT) 25 MG tablet Take 25 mg by mouth every 6 hours as needed for dizziness       meclizine (ANTIVERT) 25 MG tablet Take 1 tablet (25 mg) by mouth 2 times daily       melatonin 1 MG TABS tablet Take 1 mg by mouth At Bedtime ALSO TAKING 1 MG PO HS PRN FOR INSOMNIA       mirtazapine (REMERON) 15 MG tablet Take 1 tablet (15 mg) by mouth At Bedtime 90 tablet 3     multivitamin (OCUVITE) TABS Take 1 tablet by mouth daily        senna-docusate (SENOKOT-S/PERICOLACE) 8.6-50 MG tablet Take 2 tablets by mouth 2 times daily as needed for constipation       senna-docusate (SENOKOT-S/PERICOLACE) 8.6-50 MG tablet Take 2 tablets by mouth 2 times daily (Patient not taking: Reported on 3/3/2022) 120 tablet 3          Controlled medications:   not applicable/none     Past Medical History:   Past Medical History:   Diagnosis Date     Atrial fibrillation (H)     amiodarone therapy discontinued due to pulmonary toxicity     Atrial flutter (H)      Benign  "essential hypertension 11/20/2018     Cancer (H) vocal cord     Carpal tunnel syndrome     abstracted 7/3/02.     Coronary artery disease involving native coronary artery of native heart without angina pectoris 5/8/2020    Cath 5/28/2020: patent stents; Cath 5/18/2020: ISABEL x4 to RCA; Cath 3/2020: 1 vessel disease; Cath 2017: moderate 2 vessel disease     CVA (cerebral infarction) 5/5/2015     Demyelinating disease of central nervous system, unspecified (H)     abstracted 7/3/02.     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     Pneumonia 8/17/2016     PVD (peripheral vascular disease) (H)      Redundant colon     needs CT colonography     Shingles      SKIN DISORDERS NEC 3/15/2005     Sleep apnea      Sleep apnea      SVT (supraventricular tachycardia) (H)      Physical Exam:   Vitals: BP 97/64   Pulse 85   Temp 97.8  F (36.6  C)   Resp 20   Ht 1.727 m (5' 8\")   Wt 60 kg (132 lb 3.2 oz)   SpO2 95%   BMI 20.10 kg/m    BMI: Body mass index is 20.1 kg/m .  GENERAL APPEARANCE:  Alert, in no distress  ENT:  Mouth and posterior oropharynx normal, moist mucous membranes, [place]  EYES:  EOM, conjunctivae, lids, pupils and irises normal, PERRL  RESP:  respiratory effort and palpation of chest normal, lungs clear to auscultation , no respiratory distress, diminished breath sounds bases bilaterally  CV:  " Palpation and auscultation of heart done , regular rate and rhythm, no murmur, rub, or gallop, no edema  ABDOMEN:  normal bowel sounds, soft, nontender, no hepatosplenomegaly or other masses  M/S:   patient sitting up in w/c, able to move all 4 extremities  SKIN:  Inspection of skin and subcutaneous tissue baseline  NEURO:   speech wnl  PSYCH:  affect and mood normal     SNF labs: Recent labs in [facility] reviewed by me today.      Assessment/Plan:  (R42) Vertigo  Acute/ongoing:  meclizine to 25mg BID 8am and noon and 25mg q 6 hours prn   appt with neurology for c/o central vertigo  appt date is 3/7/22      (J84.112) IPF (idiopathic pulmonary fibrosis) (H)  Comment: acute/ongoing  Plan: levaquin finished,  budesonide taper at 3mg QD finished  WBC 10.6 on 2/3/22  Last CXR 1/31/22      I25.10) Coronary artery disease involving native coronary artery of native heart without angina pectoris  (I48.20) Chronic a-fib (H)  (I10) Benign essential hypertension  (I35.0) Nonrheumatic aortic (valve) stenosis  Comment: ongoing  Plan: continue digoxin 125mcg QD, DC lasix and KCL  DC metoprolol 3/1/22 due to hypotension  Continue eliquis 5mg BID, plavix 75mg QD     (D47.2) MGUS  Ongoing  Followed by oncology Dr Hurley bone marrow biopsy 2/23/22 due to increase in monoclonal peak to evaluate for Multiple myeloma  Waiting for biopsy results  F/u with oncology on 3/10/22       DISCHARGE PLAN:    Follow up labs: No labs orders/due    Medical Follow Up:      Follow up with primary care provider in 1 weeks    [facility] scheduled appointments:  Next 5 appointments (look out 90 days)    Mar 10, 2022 11:00 AM  Return Visit with Halie Lawrence DO  [facility] [place] ([facility] ) [address], [address]  [facility]  [place] [address]  [phone]         Future Appointments   Date Time Provider Department Center   3/7/2022  9:30 AM Danny Paige MD [facility]    3/10/2022 11:00 AM Halie Lawrence DO [facility]         Discharge Services: Home Care:  Occupational Therapy, Physical Therapy, Registered Nurse, Home Health Aide and     Discharge Instructions Verbalized to Patient at Discharge:     None    TOTAL DISCHARGE TIME:   Greater than 30 minutes  Electronically signed by:  Tonya Lynn Haase, APRN CNP                     Sincerely,        Tonya Lynn Haase, APRN CNP

## 2022-03-07 PROBLEM — J44.9 CHRONIC OBSTRUCTIVE PULMONARY DISEASE, UNSPECIFIED COPD TYPE (H): Status: ACTIVE | Noted: 2022-01-01

## 2022-03-07 NOTE — PROGRESS NOTES
Alfredo is a 76 year old who is being evaluated via a billable telephone visit.      What phone number would you like to be contacted at? [phone]  How would you like to obtain your AVS? Malinda Skelton   Alfredo is a 76 year old who presents for the following health issues     HPI       Hospital Follow-up Visit:    Hospital/Nursing Home/IP Rehab Facility: [facility]   Date of Admission: January 19, 2022  Date of Discharge: March 03, 2022  Reason(s) for Admission:      IPF (idiopathic pulmonary fibrosis) (H)     Physical deconditioning     Vertigo     Chronic a-fib (H)     Benign essential hypertension     Nonrheumatic aortic (valve) stenosis     MGUS (monoclonal gammopathy of unknown significance)     Dysphagia, unspecified type     Chronic diastolic congestive heart failure (H)            Was your hospitalization related to COVID-19? No   Problems taking medications regularly:  None  Medication changes since discharge: None  Problems adhering to non-medication therapy:  None    Summary of hospitalization:  [facility] discharge summary reviewed  Diagnostic Tests/Treatments reviewed.  Follow up needed: gastroenterology   Other Healthcare Providers Involved in Patient s Care:         None  Update since discharge: stable. Post Discharge Medication Reconciliation: discharge medications reconciled, continue medications without change.  Plan of care communicated with patient and son          Mod-severe oropharyngeal dysphagia  Esophageal dysmotility  Hx esophageal stricture s/p dilation 2018  Hx vocal fold malignancy   Still reports lack of appetite due to significant work of eating related to poor dentition, dysphagia and esophageal dysmotility.  He did have a gastroenterology consult and esophagram 1/11 showing severe tertiary contractions and esophageal dysmotility, with no definite fixed strictures.  Video swallow 1/12 with mod-severe oropharyngeal dysphagia and recommended pureed diet with  thickened liquids.  In discussing his dysphagia further with his son, it was noted that dysphagia began in the 1990's as result of a demyelinating encephalopathy and has worsened.   He did have a recent video swallow on February 28 which also confirmed persistent dysmotility.  He is continued on a puréed diet.  He is also continued on mirtazapine to help with appetite       Severe aortic stenosis  severe tricuspid regurgitation w/ resultant   chronic valvular heart failure  Most recent echocardiogram December 6, 2021 with preserved ejection fraction and moderately dilated right ventricle.  Patient is followed by cardiology, recently discussed at TAVR conference, with son reporting he is not felt to be a candidate due to high risk.  This was not addressed while in the hospital     Persistent atrial fibrillation; [id number] of 6  He is continued on digoxinl for rate control and apixaban for stroke prevention.  He has had no bleeding issues with his medications     Pulmonary fibrosis  Pulmonary fibrosis was again noted during his hospitalization.  There is a question of whether this is secondary to amiodarone toxicity.  A repeat CT scan was recommended.     Monoclonal gammopathy of uncertain significance  No known lytic lesions.  Increasing mediastinal lymphadenopathy noted on admission CT imaging.  Oncology consult was obtained during hospitalization and a bone marrow biopsy was performed.  The results did not show multiple myeloma, and I do have a follow-up appointment scheduled to review this     Collagenous colitis  Completed the course of budesonide and loose stools improved.  Did take probiotic during the hospital. Not taking pro-biotic at this time.     T11 and T12 compression fracture   Still complains of mid-back pain.  Taking acetaminophen as needed, but son would like to consider taking acetaminophen scheduled twice per day     Goals of treatment  Continues to want full code status       Review of Systems    Constitutional, HEENT, cardiovascular - has follow up in cardiology and continues on rate control with digoxin and apixaban, pulmonary - breathing has been stable without use of any inhalers, gastroenterology - as above and gu systems are negative, except as otherwise noted.      Objective           Vitals:  No vitals were obtained today due to virtual visit.    Physical Exam   healthy, alert and no distress  PSYCH: Alert and oriented times 3; coherent speech, normal   rate and volume, able to articulate logical thoughts, able   to abstract reason, no tangential thoughts, no hallucinations   or delusions  His affect is normal  RESP: No cough, no audible wheezing, able to talk in full sentences  Remainder of exam unable to be completed due to telephone visits      (M80.00XD) Age-related osteoporosis with current pathological fracture with routine healing, subsequent encounter  (primary encounter diagnosis)  Comment: We discussed options for management of pain, and he will initiate scheduled acetaminophen as well as as needed use of Voltaren gel  Plan: diclofenac (VOLTAREN) 1 % topical gel            (G25.81) Restless legs syndrome (RLS)  Comment: Okay to continue gabapentin at current dose of 600 mg at night  Plan:     (I48.20) Chronic a-fib (H)  Comment: Continue follow-up with cardiology.  Note that metoprolol was attempted did cause hypotension and therefore discontinued.  Digoxin has been used for rate control.  Apixaban for stroke prevention  Plan: Digoxin    (J44.9) Chronic obstructive pulmonary disease, unspecified COPD type (H)  Comment: No issues with regards to breathing.  Pneumonia seems to be resolved.  No further antibiotics  Plan:     (E43) Edema due to malnutrition, due to unspecified malnutrition type (H)  Comment: Continue to work on nutrition.  Diet has remained puréed and follow-up with speech therapy to help with congestion as well  Plan:     (G47.33) MEL (obstructive sleep apnea)  Comment:  Continue use of CPAP  Plan:     (G93.40) Encephalopathy  Comment: Follow-up with neurology.  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  Bone marrow biopsy was reviewed as well as bone survey and does support diagnosis of monoclonal gammopathy of undetermined significance with slow progression  Plan:     (J18.9) Pneumonia of both lower lobes due to infectious organism  Comment: I did recommend repeating a CT scan in April.  We can then follow-up in clinic after this result  Plan: CT Chest w/o Contrast                   Phone call duration: 41 minutes

## 2022-03-07 NOTE — TELEPHONE ENCOUNTER
Left detailed voicemail for approval of delay start of care until 3/9/22 . Also to call  ([phone]) if there are any questions.     Sharon Zaragoza RN  [facility]

## 2022-03-07 NOTE — TELEPHONE ENCOUNTER
Kae from [facility] called requesting providers approval to delay start home care visit to 03/09/2022. Pt didn't want a visit last week and HC can't see him until 03/09.  Please advice    Ok to leave a message at [phone] with providers response.

## 2022-03-09 NOTE — TELEPHONE ENCOUNTER
Valentina from [facility] called requesting approval for SN 1 x awk for 8 wks.     PT, speech therapy and social work evaluation. Verbal approval given.

## 2022-03-09 NOTE — PROGRESS NOTES
Clinic Care Coordination Contact  The Community Health Worker called for a Care Coordination outreach to follow up on TCM discharge and to offer the CC program.  Spoke with patient's son, Efrem.    Patient's son stated he father was not available at the moment, and requested a call back.    CHW will call back tomorrow.    CHASE Amaya  Clinic Care Coordination  [facility]: [facility], [place], [facility], and [facility]  Phone: [phone]

## 2022-03-10 NOTE — PROGRESS NOTES
[facility]    Hematology/Oncology Established Patient Follow-up Note    Treatment Summary:      Today's Date: 3/10/22    Reason for Follow-up: MGUS, IgG kappa type      HISTORY OF PRESENT ILLNESS: Efrem Ramos is a 76 year old male who presents for follow-up of MGUS and vitamin B12 deficiency anemia.       --He was previously followed by Dr. Alan Solomon for MGUS in 2013 when his M-spike was 0.6 g/dL.  He then transferred his care to Dr. Shae Aldana around 2015.    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  For his vitamin B12 deficiency anemia, he received oral B12 2000 mcg once per week.  --1/10/2022: Hospitalized at [facility] for pneumonia. M-spike increased from 0.6 g/dL (2/16/2021) to 1.3 g/dL (1/12/2022). IgG increased from 1936 to 2276. Kappa free light chains 12.96; lambda FLC 3.45, ratio 3.76.  -2/23/22: bone marrow showed mildly hypercellular bone marrow with 7% plasma cells; FISH now with gain of 1q (24%); partial loss of CDKN2C signal (8%), 3 signals each for chromosomes 5, 9, and 15 (81%), and monosmony 13 (90%); no loss of TP53 or rearrangement of IGH.  -2/25/22: Stable hyperlucency in the calvarium; no lytic lesions.      INTERIM HISTORY:  Patient underwent bone marrow biopsy on 2/23/22 and bone survey on 2/25/22. He is accompanied by his son today in clinic. He continues to have fatigue, dysphagia, and poor appetite. Patient also with chronic pain, no change in symptoms.       REVIEW OF SYSTEMS:   A 14 point ROS was reviewed with pertinent positives and negatives in the HPI.       HOME MEDICATIONS:  Current Outpatient Medications   Medication Sig Dispense Refill     acetaminophen (TYLENOL) 325 MG tablet Take  325-650 mg by mouth every 6 hours as needed for mild pain        apixaban ANTICOAGULANT (ELIQUIS) 5 MG tablet Take 1 tablet (5 mg) by mouth 2 times daily 180 tablet 3     atorvastatin (LIPITOR) 40 MG tablet Take 1 tablet (40 mg) by mouth daily 90 tablet 3     calcium carbonate 600 mg-vitamin D 400 units (CALTRATE) 600-400 MG-UNIT per tablet Take 1 tablet by mouth 2 times daily        clopidogrel (PLAVIX) 75 MG tablet Take 1 tablet (75 mg) by mouth daily 90 tablet 1     diclofenac (VOLTAREN) 1 % topical gel Apply 4 g topically 4 times daily as needed for moderate pain 150 g 11     digoxin (LANOXIN) 125 MCG tablet Take 1 tablet (125 mcg) by mouth daily 90 tablet 3     gabapentin (NEURONTIN) 300 MG capsule TAKE 2 CAPSULES BY MOUTH EVERY EVENING 180 capsule 3     meclizine (ANTIVERT) 25 MG tablet Take 1 tablet (25 mg) by mouth 2 times daily       melatonin 1 MG TABS tablet Take 1 mg by mouth At Bedtime ALSO TAKING 1 MG PO HS PRN FOR INSOMNIA       mirtazapine (REMERON) 15 MG tablet Take 1 tablet (15 mg) by mouth At Bedtime 90 tablet 3     multivitamin (OCUVITE) TABS Take 1 tablet by mouth daily        senna-docusate (SENOKOT-S/PERICOLACE) 8.6-50 MG tablet Take 2 tablets by mouth 2 times daily as needed for constipation           ALLERGIES:  Allergies   Allergen Reactions     Sulfa Drugs Difficulty breathing, Swelling and Hives     Adhesive Tape Blisters     Amiodarone Other (See Comments)     Developed pleural effusion     Penicillins Other (See Comments)     Reaction occurred as a child  Other reaction(s): Hives         PAST MEDICAL HISTORY:  Past Medical History:   Diagnosis Date     Atrial fibrillation (H)     amiodarone therapy discontinued due to pulmonary toxicity     Atrial flutter (H)      Benign essential hypertension 11/20/2018     Cancer (H) vocal cord     Carpal tunnel syndrome     abstracted 7/3/02.     Coronary artery disease involving native coronary artery of native heart without angina pectoris  05/08/2020    Cath 5/28/2020: patent stents; Cath 5/18/2020: ISABEL x4 to RCA; Cath 3/2020: 1 vessel disease; Cath 2017: moderate 2 vessel disease     CVA (cerebral infarction) 05/05/2015     Demyelinating disease of central nervous system, unspecified (H)     abstracted 7/3/02. 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     Pneumonia 08/17/2016     PVD (peripheral vascular disease) (H)      Redundant colon     needs CT colonography     Shingles      SKIN DISORDERS NEC 03/15/2005     Sleep apnea      Sleep apnea      SVT (supraventricular tachycardia) (H)          PAST SURGICAL HISTORY:  Past Surgical History:   Procedure Laterality Date     BIOPSY  brain 2002     BONE MARROW BIOPSY, BONE SPECIMEN, NEEDLE/TROCAR N/A 6/8/2017    Procedure: BIOPSY BONE MARROW;  UNILATERAL BONE MARROW BIOPSY (CONSCIOUS SEDATION) ;  Surgeon: Jamie Gonzales MD;  Location:  GI     BONE MARROW BIOPSY, BONE SPECIMEN, NEEDLE/TROCAR N/A 2/23/2022    Procedure: BIOPSY, BONE MARROW;  Surgeon: Carmelita Aguilera MD;  Location:  GI     CARDIAC CATHERIZATION  09/05/2017    2V CAD, IFR of RCA 0.95     CV CORONARY ANGIOGRAM N/A 3/23/2020    Procedure: Coronary Angiogram and right heart cath;  Surgeon: Gino Ferrer MD;  Location:  HEART CARDIAC CATH LAB     CV HEART CATHETERIZATION  WITH POSSIBLE INTERVENTION N/A 5/28/2020    Procedure: Heart Catheterization with Possible Intervention;  Surgeon: Larry Chawla MD;  Location:  HEART CARDIAC CATH LAB     CV HEART CATHETERIZATION WITH POSSIBLE INTERVENTION N/A 5/18/2020    Procedure: Heart Catheterization with Possible Intervention;  Surgeon: Gaurang Swenson MD;  Location:  HEART CARDIAC CATH LAB     CV INSTANTANEOUS WAVE-FREE RATIO N/A 3/23/2020    Procedure: Instantaneous Wave-Free Ratio;  Surgeon: Gino Ferrer MD;  Location:  HEART CARDIAC CATH LAB     CV PCI ATHERECTOMY ORBITAL N/A 5/18/2020    Procedure: Percutaneous Coronary Intervention Atherectomy Rotational;  Surgeon: Gaurang Swenson MD;  Location:  HEART CARDIAC CATH LAB     CV PCI STENT DRUG ELUTING N/A 5/18/2020    Procedure: Percutaneous Coronary Intervention Stent Drug Eluting;  Surgeon: Gaurang Swenson MD;  Location:  HEART CARDIAC CATH LAB     CV RIGHT HEART CATH MEASUREMENTS RECORDED N/A 5/28/2020    Procedure: Right Heart Cath;  Surgeon: Larry Chawla MD;  Location:  HEART CARDIAC CATH LAB     CV TEMPORARY PACEMAKER INSERTION N/A 5/18/2020    Procedure: Temporary Pacemaker Insertion;  Surgeon: Gaurang Swenson MD;  Location:  HEART CARDIAC CATH LAB     ESOPHAGOSCOPY, GASTROSCOPY, DUODENOSCOPY (EGD), COMBINED N/A 9/8/2018    Procedure: COMBINED ESOPHAGOSCOPY, GASTROSCOPY, DUODENOSCOPY (EGD), BIOPSY SINGLE OR MULTIPLE;;  Surgeon: Xander Marie MD;  Location:  GI     HEAD & NECK SURGERY       ORTHOPEDIC SURGERY      right arm ulna reset after injury     THORACOSCOPIC WEDGE RESECTION LUNG Right 8/2/2016    Procedure: THORACOSCOPIC WEDGE RESECTION LUNG;  Surgeon: Abdelrahman Noriega MD;  Location:  OR     [facility] NONSPECIFIC PROCEDURE  as a child    T & A. abstracted 7/3/02.     ZZC NONSPECIFIC PROCEDURE  early    CTR. abstracted 7/3/02.         SOCIAL HISTORY:  Social History     Socioeconomic History      Marital status:      Spouse name: Not on file     Number of children: Not on file     Years of education: Not on file     Highest education level: Not on file   Occupational History     Not on file   Tobacco Use     Smoking status: Former Smoker     Packs/day: 1.00     Years: 30.00     Pack years: 30.00     Types: Cigarettes     Quit date: 2013     Years since quittin.6     Smokeless tobacco: Never Used   Substance and Sexual Activity     Alcohol use: Not Currently     Alcohol/week: 42.0 standard drinks     Types: 42 Standard drinks or equivalent per week     Drug use: No     Sexual activity: Not Currently   Other Topics Concern     Parent/sibling w/ CABG, MI or angioplasty before 65F 55M? Not Asked      Service Not Asked     Blood Transfusions Not Asked     Caffeine Concern Yes     Comment: 1 Coke occasionally      Occupational Exposure Not Asked     Hobby Hazards Not Asked     Sleep Concern Not Asked     Stress Concern Not Asked     Weight Concern Not Asked     Special Diet No     Back Care Not Asked     Exercise No     Bike Helmet Not Asked     Seat Belt Not Asked     Self-Exams Not Asked   Social History Narrative    Retired (disability)      Social Determinants of Health     Financial Resource Strain: Not on file   Food Insecurity: Not on file   Transportation Needs: Not on file   Physical Activity: Not on file   Stress: Not on file   Social Connections: Not on file   Intimate Partner Violence: Not on file   Housing Stability: Not on file         FAMILY HISTORY:  Family History   Problem Relation Age of Onset     Blood Disease Mother         Anemia     Cardiovascular Father      Cancer - colorectal Maternal Grandfather      Hypertension Brother      Diabetes Sister 5        Juvinile Diabetes passed at 36     Respiratory Other         Lung Cancer         PHYSICAL EXAM:  Vital signs:  BP 90/61   Pulse 82   Temp 98.3  F (36.8  C) (Oral)   Resp 17   Wt 60.8 kg (134 lb)    SpO2 96%   BMI 20.37 kg/m     ECO.5-2  GENERAL/CONSTITUTIONAL: No acute distress. Thin, frail, malnourished, chronically ill appearing.   EYES: Pupils are equal, round, and react to light and accommodation. Extraocular movements intact.  No scleral icterus.  ENT/MOUTH: Neck supple. Oropharynx clear, no mucositis.  LYMPH: No anterior cervical, posterior cervical, supraclavicular, axillary or inguinal adenopathy.   RESPIRATORY: Diminished B/L. No crackles or wheezing. Patient has accessory muscle use.  CARDIOVASCULAR: Regular rate and rhythm without murmurs, gallops, or rubs.  GASTROINTESTINAL: No hepatosplenomegaly, masses, or tenderness. The patient has normal bowel sounds. No guarding.  No distention.  MUSCULOSKELETAL: Warm and well-perfused, no cyanosis, clubbing, or edema.  NEUROLOGIC: Cranial nerves II-XII are intact. Alert, oriented, answers questions appropriately.  INTEGUMENTARY: No rashes or jaundice.  GAIT: Seated in wheelchair.      LABS:  CBC RESULTS:   Recent Labs   Lab Test 22  1121   WBC 10.5   RBC 3.53*   HGB 11.4*   HCT 35.4*      MCH 32.3   MCHC 32.2   RDW 14.5          Recent Labs   Lab Test 22  0815 22  0805 22  0910 22  1048 22  0708   NA  --   --   --  138 138   POTASSIUM  --   --   --  3.6 3.5   CHLORIDE 104 106   < > 108 108   CO2  --   --   --  26 25   ANIONGAP  --   --   --  4 5   GLC  --   --   --  85 81   BUN  --   --   --  12 11   CR  --   --   --  0.74 0.79   ULISSES  --   --   --  8.3* 8.0*    < > = values in this interval not displayed.      Ref. Range 2022 18:38   Albumin Fraction Latest Ref Range: 3.7 - 5.1 g/dL 2.9 (L)   Alpha 1 Fraction Latest Ref Range: 0.2 - 0.4 g/dL 0.5 (H)   Alpha 2 Fraction Latest Ref Range: 0.5 - 0.9 g/dL 1.0 (H)   Beta Fraction Latest Ref Range: 0.6 - 1.0 g/dL 0.7   ELP Interpretation: Unknown Monoclonal protein (1.3 g/dL) seen in the gamma fraction. Previously characterized in our laboratory on 21  as a monoclonal IgG immunoglobulin of kappa light chain type.   Gamma Fraction Latest Ref Range: 0.7 - 1.6 g/dL 2.3 (H)   IGG Latest Ref Range: 610-1,616 mg/dL 2,276 (H)   [place] Free Lt Chain Latest Ref Range: 0.33 - 1.94 mg/dL 12.96 (H)   Kappa Lambda Ratio Latest Ref Range: 0.26 - 1.65  3.76 (H)   Lambda Free Lt Chain Latest Ref Range: 0.57 - 2.63 mg/dL 3.45 (H)   Monoclonal Peak Latest Ref Range: <=0.0 g/dL 1.3 (H)   Total Protein Serum for ELP Latest Ref Range: 6.8 - 8.8 g/dL 7.3         SPEP - M-spike:  5/23/2017: 0.4  9/19/2017: 0.4  3/21/2018: 0.4  9/9/2018: 0.5  12/31/2018: 0.5  7/2/2019: 0.7  1/9/2020: 0.8  2/16/2021: 0.6  1/12/2022: 1.3     1/12/2022:  IgG = 1936 -> 2276    PATHOLOGY:  Final Diagnosis 2/23/22:   Peripheral blood demonstrating macrocytic anemia with left shift    Bone marrow trephine biopsy and aspirate demonstrating mildly hypercellular bone marrow with 7% plasma cells    Increased marrow storage iron    Cytogenetic studies pending   Electronically signed by Jason Zelaya MD on 2/25/2022 at  9:33 AM   Comment    Morphologic features are compatible with this patient's history of monoclonal gammopathy of uncertain significance.  Plasma cells however, appear increased over the 0.3% monoclonal plasma cells noted in 2017.       Authorizing Provider:  Carmelita Aguilera,   Collected:           02/23/2022 11:36 AM                                  MD                                                                            Ordering Location:     [facility]          Received:            02/23/2022 12:15 PM                                  [facility]                                                           Pathologist:           Gisell Matos MD                                                            Specimen:    Iliac Crest, Bone Marrow Aspirate, Right                                                   Flow Interpretation   A. Bone Marrow, Iliac Crest, Bone  Marrow Aspirate, Right:  -Kappa monotypic plasma cells  -Polytypic B cells     Interpretation    METHODS:  Specimen: Bone marrow specimen enriched for plasma cells using a  immunomagnetic isolation assay ([employer])  Test performed: Fluorescence in situ hybridization (FISH)  Interphase cells examined:  for each probe set  Probes:  - CDKN2C (1p32.3) / CKS1B (1q21.3) (dual color) - Cytocell  - [id number] (5p15.2), CEP9 and D15Z4 (centromeres of 9 and 15) (Tri-Color) - Abbott Molecular  - FIDE (11q22.3) / TP53 (17p13.1) (dual color) - Cytocell  - [id number] (13q14.3) / LAMP1 (13q34) (dual color) - [employer] Molecular  - IGH (14q32.3) (breakapart) - [employer] Molecular        RESULTS:    ABNORMAL: HIGH-RISK  - Gain of 1q (24%)     ABNORMAL: OTHER  - Partial loss of CDKN2C signal (8%)  - 3 signals each for chromosomes 5, 9, and 15 (81%)  - Monosomy 13 (90%)     NORMAL  - No loss of TP53  - No rearrangement of IGH     Chromosome analysis pending      IMAGIN2022 Chest CT:  1.  Progressed emphysema.   2.  Progressed fibrotic changes with bronchiectasis and increased interstitial consolidation consistent with underlying usual interstitial pneumonitis, correlate to exclude superimposed infection.  3.  Nonspecific, increasing mediastinal lymphadenopathy, correlate to exclude underlying malignancy or lymphoproliferative cause. No discrete pulmonary mass.  4.  Mildly enlarged heart with coronary artery disease and atherosclerotic vascular disease.     2022 Brain MRI:  1. No acute intracranial process.  2. Multiple small chronic infarcts.  3. Brain atrophy and chronic-appearing white matter disease, which may  reflect a combination of chronic small vessel ischemic changes and  demyelinating disease.  4. No intracranial hemorrhage, extra-axial fluid collection, mass  lesion or herniation.      2022 MR cervical spine:  1.  No abnormal signal or abnormal enhancement cervical spinal cord.  2.   Prominent degenerative changes leading to canal compromise or neural foraminal narrowing at multiple levels as described above.     1/14/2022 MR thoracic spine:  1.  Acute to subacute moderate to severe compression fracture T11 vertebral body with no evidence of canal compromise.  2.  Acute on chronic severe compression fracture T12 vertebral body with no evidence of canal compromise.  3.  Stable severe chronic L1 compression fracture with cement material within.  4.  No abnormal signal or abnormal enhancement of thoracic spinal cord.  5.  No significant canal compromise or significant neural foraminal narrowing throughout thoracic spine.    EXAM: BONE SURVEY COMPLETE  DATE/TIME: 2/25/2022 2:35 PM     INDICATION: MGUS (monoclonal gammopathy of unknown significance).  COMPARISON: 2/16/2021                                                                      IMPRESSION: Unchanged lucency within the superior calvarium. Moderate  to severe multilevel cervical spine degenerative change. Mild thoracic  spondylosis. Stable T7 vertebral body compression fracture. Unchanged  T11, T12 and L1 compression fractures post L1 vertebroplasty. Moderate  to severe multilevel lumbar spondylosis. Profound bone  demineralization diffusely. S-shaped scoliosis of the thoracolumbar  spine. Mild bilateral hip osteoarthritis. Degenerative change of both  SI joints. Nonspecific intestinal gas pattern. Arterial calcification.  Degenerative arthrosis both acromioclavicular joints. Extensive mitral  annular calcification with coronary arterial calcification.      Multifocal interstitial and pulmonary parenchymal opacities in the  lungs, particularly in the right lung. Correlate for pulmonary edema  or infection. Follow up suggested.     No large lytic lesion is seen within the spine, pelvis, or  appendicular skeleton.       ASSESSMENT/PLAN:  Efrem Ramos is a 76 year old male with the following issues:  1. Monoclonal gammopathy of  undetermined significance, IgG kappa  -I reviewed the 1/12/2022 labs with Alfredo.  His M-spike has increased from 0.6 to 1.3 g/dL.  He has no evidence of  hypercalcemia, renal failure, or other end organ disease at the present time. He has anemia but this may be related to his acute illness/pneumonia while in-hospital and has improved since hospital discharge to 11.7 g/dL  -I discussed that he has at least 1-2% chance per year of developing multiple myeloma or other lymphoproliferative disorder.  -As of 2/23/22, hemoglobin 11.4. Renal function and calcium levels WNL.  -Bone marrow biopsy on 2/23/22 showing increase in plasma cells from 0.3% in 2017 to 7% currently. He has new gain of 1q.   -Bone survey 2/25/22 negative for lytic lesions; patient has stable hyperlucency of the calvarium.     -Discussed with patient and son updated bone marrow biopsy results does show progression with plasma cells now up to 7% and gain of 1q. This increases MGUS to high-risk. He, otherwise, does not meet criteria for overt multiple myeloma (Hb 11.3, Cr 0.74, corrected calcium 9.3, plasma cells 7% on bone marrow, absence of lytic lesions on skeletal survey, and kappa/lambda ratio of 3.76). No indication to begin systemic treatment at this point.   -Last SPIEP ordered on 1/12/22 and will update CBC, CMP, total immunoglobulins, FLC, SPIEP, and peripheral smear at this time. Will increase monitoring to every 3 months.      2. History of vitamin B12 deficiency  -1/14/2022 Vitamin B12 level was elevated at 1028.  -He discontinued vitamin B12 supplement.     3. Pulmonary fibrosis, history of pulmonary nodule  -Follows with Dr. Abad.  -1/11/2022 Chest CT scan showed progressed emphysema, fibrotic changes, increased interstitial consolidation with underlying usual interstitial pneumonitis.  He also has bronchiectasis, severe CAD. The scan also showed nonspecific increasing mediastinal lymphadenopathy but no discrete pulmonary mass.  This may  be potentially reactive given his pneumonia.     4. Moderate aortic stenosis  -Seen by Dr. Cortes previously in 8/2019.  -Intermediate to high risk surgical candidate.  Not felt to need TAVR at present time due to relative asymptomatic status.     5. Lumbar spine pain with history of L1 compression fracture  -His mid back pain is chronic since at least 2018 when he underwent vertebroplasty for his L1 compression fracture but subsequently suffered a fall and reinjuring his back.      6. Patient's son is inquiring as to increased fatigue, poor appetite, and the use of medical marijuana. I had asked when his last follow up was with either Pulmonology or Cardiology to assess for updated status of disease states. Per record review, he has had progressive fibrotic changes of the lungs and is also felt to not be a surgical candidate for TAVR. He is not on oxygen therapy and O2 sats in clinic are 96% on RA. I had stated patient does not have an active malignancy and from a Hematologic/Oncologic standpoint, does not qualify for medical marijuana. He actively follows with pain management and they are soon to discuss. I had encouraged follow up with PCP to discuss further Pulmonology or Cardiology follow ups if deemed necessary.     7. Repeat labs pending today. Recheck labs in 3 months with follow up with Dr. Hurley shortly after.       Halie Lawrence DO  Hematology/Oncology  [facility]

## 2022-03-10 NOTE — PROGRESS NOTES
"Oncology Rooming Note    March 10, 2022 11:12 AM   Efrem Ramos is a 76 year old male who presents for:    Chief Complaint   Patient presents with     Oncology Clinic Visit     Initial Vitals: BP 90/61   Pulse 82   Temp 98.3  F (36.8  C) (Oral)   Resp 17   Wt 60.8 kg (134 lb)   SpO2 96%   BMI 20.37 kg/m   Estimated body mass index is 20.37 kg/m  as calculated from the following:    Height as of 3/2/22: 1.727 m (5' 8\").    Weight as of this encounter: 60.8 kg (134 lb). Body surface area is 1.71 meters squared.  Severe Pain (6) Comment: back, great toe on right foot   No LMP for male patient.  Allergies reviewed: Yes  Medications reviewed: Yes    Medications: Medication refills not needed today.  Pharmacy name entered into [facility]:    [id number] - [place], [place] - [address] [facility] [id number] [place], [place] - [address] AT [facility] [address]    Clinical concerns:  doctor was notified.      Ebony Johnson CMA            "

## 2022-03-10 NOTE — LETTER
3/10/2022         RE: Efrem Ramos  [address] [facility] [id number]        Dear Colleague,    Thank you for referring your patient, Efrem Ramos, to the [facility]. Please see a copy of my visit note below.    [facility]    Hematology/Oncology Established Patient Follow-up Note    Treatment Summary:      Today's Date: 3/10/22    Reason for Follow-up: MGUS, IgG kappa type      HISTORY OF PRESENT ILLNESS: Efrem Ramos is a 76 year old male who presents for follow-up of MGUS and vitamin B12 deficiency anemia.       --He was previously followed by Dr. Alan Solomon for MGUS in 2013 when his M-spike was 0.6 g/dL.  He then transferred his care to Dr. Shae Aldana around 2015.    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  For his vitamin B12 deficiency anemia, he received oral B12 2000 mcg once per week.  --1/10/2022: Hospitalized at [facility] for pneumonia. M-spike increased from 0.6 g/dL (2/16/2021) to 1.3 g/dL (1/12/2022). IgG increased from 1936 to 2276. Kappa free light chains 12.96; lambda FLC 3.45, ratio 3.76.  -2/23/22: bone marrow showed mildly hypercellular bone marrow with 7% plasma cells; FISH now with gain of 1q (24%); partial loss of CDKN2C signal (8%), 3 signals each for chromosomes 5, 9, and 15 (81%), and monosmony 13 (90%); no loss of TP53 or rearrangement of IGH.  -2/25/22: Stable hyperlucency in the calvarium; no lytic lesions.      INTERIM HISTORY:  Patient underwent bone marrow biopsy on 2/23/22 and bone survey on 2/25/22. He is accompanied by his son today in clinic. He continues to have fatigue, dysphagia, and poor appetite. Patient also with chronic pain, no change in  symptoms.       REVIEW OF SYSTEMS:   A 14 point ROS was reviewed with pertinent positives and negatives in the HPI.       HOME MEDICATIONS:  Current Outpatient Medications   Medication Sig Dispense Refill     acetaminophen (TYLENOL) 325 MG tablet Take 325-650 mg by mouth every 6 hours as needed for mild pain        apixaban ANTICOAGULANT (ELIQUIS) 5 MG tablet Take 1 tablet (5 mg) by mouth 2 times daily 180 tablet 3     atorvastatin (LIPITOR) 40 MG tablet Take 1 tablet (40 mg) by mouth daily 90 tablet 3     calcium carbonate 600 mg-vitamin D 400 units (CALTRATE) 600-400 MG-UNIT per tablet Take 1 tablet by mouth 2 times daily        clopidogrel (PLAVIX) 75 MG tablet Take 1 tablet (75 mg) by mouth daily 90 tablet 1     diclofenac (VOLTAREN) 1 % topical gel Apply 4 g topically 4 times daily as needed for moderate pain 150 g 11     digoxin (LANOXIN) 125 MCG tablet Take 1 tablet (125 mcg) by mouth daily 90 tablet 3     gabapentin (NEURONTIN) 300 MG capsule TAKE 2 CAPSULES BY MOUTH EVERY EVENING 180 capsule 3     meclizine (ANTIVERT) 25 MG tablet Take 1 tablet (25 mg) by mouth 2 times daily       melatonin 1 MG TABS tablet Take 1 mg by mouth At Bedtime ALSO TAKING 1 MG PO HS PRN FOR INSOMNIA       mirtazapine (REMERON) 15 MG tablet Take 1 tablet (15 mg) by mouth At Bedtime 90 tablet 3     multivitamin (OCUVITE) TABS Take 1 tablet by mouth daily        senna-docusate (SENOKOT-S/PERICOLACE) 8.6-50 MG tablet Take 2 tablets by mouth 2 times daily as needed for constipation           ALLERGIES:  Allergies   Allergen Reactions     Sulfa Drugs Difficulty breathing, Swelling and Hives     Adhesive Tape Blisters     Amiodarone Other (See Comments)     Developed pleural effusion     Penicillins Other (See Comments)     Reaction occurred as a child  Other reaction(s): Hives         PAST MEDICAL HISTORY:  Past Medical History:   Diagnosis Date     Atrial fibrillation (H)     amiodarone therapy discontinued due to pulmonary toxicity      Atrial flutter (H)      Benign essential hypertension 11/20/2018     Cancer (H) vocal cord     Carpal tunnel syndrome     abstracted 7/3/02.     Coronary artery disease involving native coronary artery of native heart without angina pectoris 05/08/2020    Cath 5/28/2020: patent stents; Cath 5/18/2020: ISABEL x4 to RCA; Cath 3/2020: 1 vessel disease; Cath 2017: moderate 2 vessel disease     CVA (cerebral infarction) 05/05/2015     Demyelinating disease of central nervous system, unspecified (H)     abstracted 7/3/02. 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     Pneumonia 08/17/2016     PVD (peripheral vascular disease) (H)      Redundant colon     needs CT colonography     Shingles      SKIN DISORDERS NEC 03/15/2005     Sleep apnea      Sleep apnea      SVT (supraventricular tachycardia) (H)          PAST SURGICAL HISTORY:  Past Surgical History:   Procedure Laterality Date     BIOPSY  brain 2002     BONE MARROW BIOPSY, BONE SPECIMEN, NEEDLE/TROCAR N/A 6/8/2017    Procedure: BIOPSY BONE MARROW;  UNILATERAL BONE MARROW BIOPSY (CONSCIOUS SEDATION) ;  Surgeon: Jamie Gonzales MD;  Location:  GI     BONE MARROW BIOPSY, BONE SPECIMEN, NEEDLE/TROCAR N/A 2/23/2022    Procedure: BIOPSY, BONE MARROW;  Surgeon: Carmelita Aguilera MD;  Location:  GI     CARDIAC  CATHERIZATION  09/05/2017    2V CAD, IFR of RCA 0.95     CV CORONARY ANGIOGRAM N/A 3/23/2020    Procedure: Coronary Angiogram and right heart cath;  Surgeon: Gino Ferrer MD;  Location:  HEART CARDIAC CATH LAB     CV HEART CATHETERIZATION WITH POSSIBLE INTERVENTION N/A 5/28/2020    Procedure: Heart Catheterization with Possible Intervention;  Surgeon: Larry Chawla MD;  Location:  HEART CARDIAC CATH LAB     CV HEART CATHETERIZATION WITH POSSIBLE INTERVENTION N/A 5/18/2020    Procedure: Heart Catheterization with Possible Intervention;  Surgeon: Gaurang Swenson MD;  Location:  HEART CARDIAC CATH LAB     CV INSTANTANEOUS WAVE-FREE RATIO N/A 3/23/2020    Procedure: Instantaneous Wave-Free Ratio;  Surgeon: Gino Ferrer MD;  Location:  HEART CARDIAC CATH LAB     CV PCI ATHERECTOMY ORBITAL N/A 5/18/2020    Procedure: Percutaneous Coronary Intervention Atherectomy Rotational;  Surgeon: Gaurang Swenson MD;  Location:  HEART CARDIAC CATH LAB     CV PCI STENT DRUG ELUTING N/A 5/18/2020    Procedure: Percutaneous Coronary Intervention Stent Drug Eluting;  Surgeon: Gaurang Swenson MD;  Location:  HEART CARDIAC CATH LAB     CV RIGHT HEART CATH MEASUREMENTS RECORDED N/A 5/28/2020    Procedure: Right Heart Cath;  Surgeon: Larry Chawla MD;  Location:  HEART CARDIAC CATH LAB     CV TEMPORARY PACEMAKER INSERTION N/A 5/18/2020    Procedure: Temporary Pacemaker Insertion;  Surgeon: Gaurang Swenson MD;  Location:  HEART CARDIAC CATH LAB     ESOPHAGOSCOPY, GASTROSCOPY, DUODENOSCOPY (EGD), COMBINED N/A 9/8/2018    Procedure: COMBINED ESOPHAGOSCOPY, GASTROSCOPY, DUODENOSCOPY (EGD), BIOPSY SINGLE OR MULTIPLE;;  Surgeon: Xander Marie MD;  Location:  GI     HEAD & NECK SURGERY       ORTHOPEDIC SURGERY      right arm ulna reset after injury     THORACOSCOPIC WEDGE RESECTION LUNG Right 8/2/2016    Procedure: THORACOSCOPIC WEDGE RESECTION LUNG;  Surgeon:  Abdelrahman Noriega MD;  Location: [facility] OR     ZZC NONSPECIFIC PROCEDURE  as a child    T & A. abstracted 7/3/02.     ZZC NONSPECIFIC PROCEDURE  early    CTR. abstracted 7/3/02.         SOCIAL HISTORY:  Social History     Socioeconomic History     Marital status:      Spouse name: Not on file     Number of children: Not on file     Years of education: Not on file     Highest education level: Not on file   Occupational History     Not on file   Tobacco Use     Smoking status: Former Smoker     Packs/day: 1.00     Years: 30.00     Pack years: 30.00     Types: Cigarettes     Quit date: 2013     Years since quittin.6     Smokeless tobacco: Never Used   Substance and Sexual Activity     Alcohol use: Not Currently     Alcohol/week: 42.0 standard drinks     Types: 42 Standard drinks or equivalent per week     Drug use: No     Sexual activity: Not Currently   Other Topics Concern     Parent/sibling w/ CABG, MI or angioplasty before 65F 55M? Not Asked      Service Not Asked     Blood Transfusions Not Asked     Caffeine Concern Yes     Comment: 1 Coke occasionally      Occupational Exposure Not Asked     Hobby Hazards Not Asked     Sleep Concern Not Asked     Stress Concern Not Asked     Weight Concern Not Asked     Special Diet No     Back Care Not Asked     Exercise No     Bike Helmet Not Asked     Seat Belt Not Asked     Self-Exams Not Asked   Social History Narrative    Retired (disability)      Social Determinants of Health     Financial Resource Strain: Not on file   Food Insecurity: Not on file   Transportation Needs: Not on file   Physical Activity: Not on file   Stress: Not on file   Social Connections: Not on file   Intimate Partner Violence: Not on file   Housing Stability: Not on file         FAMILY HISTORY:  Family History   Problem Relation Age of Onset     Blood Disease Mother         Anemia     Cardiovascular Father      Cancer - colorectal Maternal Grandfather       Hypertension Brother      Diabetes Sister 5        Lindyvinile Diabetes passed at 36     Respiratory Other         Lung Cancer         PHYSICAL EXAM:  Vital signs:  BP 90/61   Pulse 82   Temp 98.3  F (36.8  C) (Oral)   Resp 17   Wt 60.8 kg (134 lb)   SpO2 96%   BMI 20.37 kg/m     ECO.5-2  GENERAL/CONSTITUTIONAL: No acute distress. Thin, frail, malnourished, chronically ill appearing.   EYES: Pupils are equal, round, and react to light and accommodation. Extraocular movements intact.  No scleral icterus.  ENT/MOUTH: Neck supple. Oropharynx clear, no mucositis.  LYMPH: No anterior cervical, posterior cervical, supraclavicular, axillary or inguinal adenopathy.   RESPIRATORY: Diminished B/L. No crackles or wheezing. Patient has accessory muscle use.  CARDIOVASCULAR: Regular rate and rhythm without murmurs, gallops, or rubs.  GASTROINTESTINAL: No hepatosplenomegaly, masses, or tenderness. The patient has normal bowel sounds. No guarding.  No distention.  MUSCULOSKELETAL: Warm and well-perfused, no cyanosis, clubbing, or edema.  NEUROLOGIC: Cranial nerves II-XII are intact. Alert, oriented, answers questions appropriately.  INTEGUMENTARY: No rashes or jaundice.  GAIT: Seated in wheelchair.      LABS:  CBC RESULTS:   Recent Labs   Lab Test 22  1121   WBC 10.5   RBC 3.53*   HGB 11.4*   HCT 35.4*      MCH 32.3   MCHC 32.2   RDW 14.5          Recent Labs   Lab Test 22  0815 22  0805 22  0910 22  1048 22  0708   NA  --   --   --  138 138   POTASSIUM  --   --   --  3.6 3.5   CHLORIDE 104 106   < > 108 108   CO2  --   --   --  26 25   ANIONGAP  --   --   --  4 5   GLC  --   --   --  85 81   BUN  --   --   --  12 11   CR  --   --   --  0.74 0.79   ULISSES  --   --   --  8.3* 8.0*    < > = values in this interval not displayed.      Ref. Range 2022 18:38   Albumin Fraction Latest Ref Range: 3.7 - 5.1 g/dL 2.9 (L)   Alpha 1 Fraction Latest Ref Range: 0.2 - 0.4 g/dL 0.5 (H)    Alpha 2 Fraction Latest Ref Range: 0.5 - 0.9 g/dL 1.0 (H)   Beta Fraction Latest Ref Range: 0.6 - 1.0 g/dL 0.7   ELP Interpretation: Unknown Monoclonal protein (1.3 g/dL) seen in the gamma fraction. Previously characterized in our laboratory on 2/16/21 as a monoclonal IgG immunoglobulin of kappa light chain type.   Gamma Fraction Latest Ref Range: 0.7 - 1.6 g/dL 2.3 (H)   IGG Latest Ref Range: 610-1,616 mg/dL 2,276 (H)   [place] Free Lt Chain Latest Ref Range: 0.33 - 1.94 mg/dL 12.96 (H)   Kappa Lambda Ratio Latest Ref Range: 0.26 - 1.65  3.76 (H)   Lambda Free Lt Chain Latest Ref Range: 0.57 - 2.63 mg/dL 3.45 (H)   Monoclonal Peak Latest Ref Range: <=0.0 g/dL 1.3 (H)   Total Protein Serum for ELP Latest Ref Range: 6.8 - 8.8 g/dL 7.3         SPEP - M-spike:  5/23/2017: 0.4  9/19/2017: 0.4  3/21/2018: 0.4  9/9/2018: 0.5  12/31/2018: 0.5  7/2/2019: 0.7  1/9/2020: 0.8  2/16/2021: 0.6  1/12/2022: 1.3     1/12/2022:  IgG = 1936 -> 2276    PATHOLOGY:  Final Diagnosis 2/23/22:   Peripheral blood demonstrating macrocytic anemia with left shift    Bone marrow trephine biopsy and aspirate demonstrating mildly hypercellular bone marrow with 7% plasma cells    Increased marrow storage iron    Cytogenetic studies pending   Electronically signed by Jason Zelaya MD on 2/25/2022 at  9:33 AM   Comment    Morphologic features are compatible with this patient's history of monoclonal gammopathy of uncertain significance.  Plasma cells however, appear increased over the 0.3% monoclonal plasma cells noted in 2017.       Authorizing Provider:  Carmelita Aguilera,   Collected:           02/23/2022 11:36 AM                                  MD                                                                            Ordering Location:     [facility]          Received:            02/23/2022 12:15 PM                                  [facility]                                                            Pathologist:           Gisell Matos MD                                                            Specimen:    Iliac Crest, Bone Marrow Aspirate, Right                                                   Flow Interpretation   A. Bone Marrow, Iliac Crest, Bone Marrow Aspirate, Right:  -Kappa monotypic plasma cells  -Polytypic B cells     Interpretation    METHODS:  Specimen: Bone marrow specimen enriched for plasma cells using a  immunomagnetic isolation assay ([employer])  Test performed: Fluorescence in situ hybridization (FISH)  Interphase cells examined:  for each probe set  Probes:  - CDKN2C (1p32.3) / CKS1B (1q21.3) (dual color) - [employer]ocell  - [id number] (5p15.2), CEP9 and D15Z4 (centromeres of 9 and 15) (Tri-Color) - [employer]  - FIDE (11q22.3) / TP53 (17p13.1) (dual color) - Cytocell  - [id number] (13q14.3) / LAMP1 (13q34) (dual color) - [employer]  - IGH (14q32.3) (breakapart) - [employer]        RESULTS:    ABNORMAL: HIGH-RISK  - Gain of 1q (24%)     ABNORMAL: OTHER  - Partial loss of CDKN2C signal (8%)  - 3 signals each for chromosomes 5, 9, and 15 (81%)  - Monosomy 13 (90%)     NORMAL  - No loss of TP53  - No rearrangement of IGH     Chromosome analysis pending      IMAGIN2022 Chest CT:  1.  Progressed emphysema.   2.  Progressed fibrotic changes with bronchiectasis and increased interstitial consolidation consistent with underlying usual interstitial pneumonitis, correlate to exclude superimposed infection.  3.  Nonspecific, increasing mediastinal lymphadenopathy, correlate to exclude underlying malignancy or lymphoproliferative cause. No discrete pulmonary mass.  4.  Mildly enlarged heart with coronary artery disease and atherosclerotic vascular disease.     2022 Brain MRI:  1. No acute intracranial process.  2. Multiple small chronic infarcts.  3. Brain atrophy and chronic-appearing white matter disease, which may  reflect a combination of chronic  small vessel ischemic changes and  demyelinating disease.  4. No intracranial hemorrhage, extra-axial fluid collection, mass  lesion or herniation.      1/14/2022 MR cervical spine:  1.  No abnormal signal or abnormal enhancement cervical spinal cord.  2.  Prominent degenerative changes leading to canal compromise or neural foraminal narrowing at multiple levels as described above.     1/14/2022 MR thoracic spine:  1.  Acute to subacute moderate to severe compression fracture T11 vertebral body with no evidence of canal compromise.  2.  Acute on chronic severe compression fracture T12 vertebral body with no evidence of canal compromise.  3.  Stable severe chronic L1 compression fracture with cement material within.  4.  No abnormal signal or abnormal enhancement of thoracic spinal cord.  5.  No significant canal compromise or significant neural foraminal narrowing throughout thoracic spine.    EXAM: BONE SURVEY COMPLETE  DATE/TIME: 2/25/2022 2:35 PM     INDICATION: MGUS (monoclonal gammopathy of unknown significance).  COMPARISON: 2/16/2021                                                                      IMPRESSION: Unchanged lucency within the superior calvarium. Moderate  to severe multilevel cervical spine degenerative change. Mild thoracic  spondylosis. Stable T7 vertebral body compression fracture. Unchanged  T11, T12 and L1 compression fractures post L1 vertebroplasty. Moderate  to severe multilevel lumbar spondylosis. Profound bone  demineralization diffusely. S-shaped scoliosis of the thoracolumbar  spine. Mild bilateral hip osteoarthritis. Degenerative change of both  SI joints. Nonspecific intestinal gas pattern. Arterial calcification.  Degenerative arthrosis both acromioclavicular joints. Extensive mitral  annular calcification with coronary arterial calcification.      Multifocal interstitial and pulmonary parenchymal opacities in the  lungs, particularly in the right lung. Correlate for pulmonary  edema  or infection. Follow up suggested.     No large lytic lesion is seen within the spine, pelvis, or  appendicular skeleton.       ASSESSMENT/PLAN:  Efrem Ramos is a 76 year old male with the following issues:  1. Monoclonal gammopathy of undetermined significance, IgG kappa  -I reviewed the 1/12/2022 labs with Alfredo.  His M-spike has increased from 0.6 to 1.3 g/dL.  He has no evidence of  hypercalcemia, renal failure, or other end organ disease at the present time. He has anemia but this may be related to his acute illness/pneumonia while in-hospital and has improved since hospital discharge to 11.7 g/dL  -I discussed that he has at least 1-2% chance per year of developing multiple myeloma or other lymphoproliferative disorder.  -As of 2/23/22, hemoglobin 11.4. Renal function and calcium levels WNL.  -Bone marrow biopsy on 2/23/22 showing increase in plasma cells from 0.3% in 2017 to 7% currently. He has new gain of 1q.   -Bone survey 2/25/22 negative for lytic lesions; patient has stable hyperlucency of the calvarium.     -Discussed with patient and son updated bone marrow biopsy results does show progression with plasma cells now up to 7% and gain of 1q. This increases MGUS to high-risk. He, otherwise, does not meet criteria for overt multiple myeloma (Hb 11.3, Cr 0.74, corrected calcium 9.3, plasma cells 7% on bone marrow, absence of lytic lesions on skeletal survey, and kappa/lambda ratio of 3.76). No indication to begin systemic treatment at this point.   -Last SPIEP ordered on 1/12/22 and will update CBC, CMP, total immunoglobulins, FLC, SPIEP, and peripheral smear at this time. Will increase monitoring to every 3 months.      2. History of vitamin B12 deficiency  -1/14/2022 Vitamin B12 level was elevated at 1028.  -He discontinued vitamin B12 supplement.     3. Pulmonary fibrosis, history of pulmonary nodule  -Follows with Dr. Abad.  -1/11/2022 Chest CT scan showed progressed emphysema, fibrotic  changes, increased interstitial consolidation with underlying usual interstitial pneumonitis.  He also has bronchiectasis, severe CAD. The scan also showed nonspecific increasing mediastinal lymphadenopathy but no discrete pulmonary mass.  This may be potentially reactive given his pneumonia.     4. Moderate aortic stenosis  -Seen by Dr. Cortes previously in 8/2019.  -Intermediate to high risk surgical candidate.  Not felt to need TAVR at present time due to relative asymptomatic status.     5. Lumbar spine pain with history of L1 compression fracture  -His mid back pain is chronic since at least 2018 when he underwent vertebroplasty for his L1 compression fracture but subsequently suffered a fall and reinjuring his back.      6. Patient's son is inquiring as to increased fatigue, poor appetite, and the use of medical marijuana. I had asked when his last follow up was with either Pulmonology or Cardiology to assess for updated status of disease states. Per record review, he has had progressive fibrotic changes of the lungs and is also felt to not be a surgical candidate for TAVR. He is not on oxygen therapy and O2 sats in clinic are 96% on RA. I had stated patient does not have an active malignancy and from a Hematologic/Oncologic standpoint, does not qualify for medical marijuana. He actively follows with pain management and they are soon to discuss. I had encouraged follow up with PCP to discuss further Pulmonology or Cardiology follow ups if deemed necessary.     7. Repeat labs pending today. Recheck labs in 3 months with follow up with Dr. Hurley shortly after.       Halie Lawrence DO  Hematology/Oncology  [facility]        Oncology Rooming Note    March 10, 2022 11:12 AM   Efrem Ramos is a 76 year old male who presents for:    Chief Complaint   Patient presents with     Oncology Clinic Visit     Initial Vitals: BP 90/61   Pulse 82   Temp 98.3  F (36.8  C) (Oral)   Resp 17   Wt 60.8  "kg (134 lb)   SpO2 96%   BMI 20.37 kg/m   Estimated body mass index is 20.37 kg/m  as calculated from the following:    Height as of 3/2/22: 1.727 m (5' 8\").    Weight as of this encounter: 60.8 kg (134 lb). Body surface area is 1.71 meters squared.  Severe Pain (6) Comment: back, great toe on right foot   No LMP for male patient.  Allergies reviewed: Yes  Medications reviewed: Yes    Medications: Medication refills not needed today.  Pharmacy name entered into [facility]:    [id number] - [place], [place] - [address] AT [address] & [address] [id number] - [place], [place] - [address] AT [address]    Clinical concerns:  doctor was notified.      Ebony Johnson, [facility]                Again, thank you for allowing me to participate in the care of your patient.        Sincerely,        Halie Lawrence, DO    "

## 2022-03-11 NOTE — TELEPHONE ENCOUNTER
Clinic Care Coordination Contact  Patient's son is requesting a referral for a Pain Clinic and a Lung doctor. Please call patient's son to discuss.  Thank you.     CHASE Amaya  Clinic Care Coordination  [facility] Clin[facility]: [facility] [place], and [facility]  Phone: [phone]

## 2022-03-11 NOTE — PROGRESS NOTES
Clinic Care Coordination Contact  Community Health Worker Initial Outreach    Spoke with patient's son, Alfredo.    CHW Initial Information Gathering:  Referral Source: SNF/TCU    CHW introduced self and offered the CC program.  Patient's son stated he would like a referral for a Pain Clinic and a Lung doctor.    Patient accepts CC: No, Not at this time. Patient will be sent Care Coordination introduction letter for future reference.     Plan: Care Coordinator will send care coordination introduction letter with care coordinator contact information and explanation of care coordination services via [email or web address]. Care Coordinator will do no further outreaches at this time.    CHW will route a message to  Triage.      CHASE Amaya  Clinic Care Coordination  [facility] Clinics: [place], [place], [place], and [place] [facility]  Phone: [phone]        [facility]: Post-Discharge Note  SITUATION                                                      Admission:    Admission Date: 01/10/22   Reason for Admission: Weakness, Leukocytosis  Discharge:   Discharge Date: 01/19/22    BACKGROUND                                                      Per hospital discharge summary and inpatient provider notes:  Efrem Ramos is a 76 year old male admitted on 1/10/2022.   Complex past history including severe aortic stenosis, A-fib/flutter, HTN, CAD s/p PCI, pulmonary fibrosis, dysphagia among other chronic medical issues who with leukocytosis, increasing coughing, poor intake, fatigue.  Son reports patient had respiratory illness about 2 weeks ago with ongoing productive cough; son reporting increased HIGH as well as worsening oral intake.  This led to evaluation at Urgent Care with finding of leukocytosis of 20 so referred to ED.     Primarily suspect presentation is combination of possible CAP (though unclear) with deconditioning related to severe malnutrition in setting of dysphagia and esophageal dysmotility and  multiple other underlying comorbidities.  May well be component of grieving vs depression given the death of his wife in September 2021. It does not appear as though there are any other acute issues to address this hospitalization. Pt will need TCU placement.        ASSESSMENT      Enrollment  Primary Care Care Coordination Status: Declined    Discharge Assessment  How are you doing now that you are home?: The dissiness is better, still fatigue and poor appetite.  How are your symptoms? (Red Flag symptoms escalate to triage hotline per guidelines): Improved  Do you feel your condition is stable enough to be safe at home until your provider visit?: Yes (Patient's son has been staying with him)  Does the patient have their discharge instructions? : Yes  Does the patient have questions regarding their discharge instructions? : No  Were you started on any new medications or were there changes to any of your previous medications? : No  Does the patient have all of their medications?: Yes  Do you have questions regarding any of your medications? : No  Do you have all of your needed medical supplies or equipment (DME)?  (i.e. oxygen tank, CPAP, cane, etc.): Yes  Discharge follow-up appointment scheduled within 14 calendar days? : Yes  Discharge Follow Up Appointment Date: 03/07/22  Discharge Follow Up Appointment Scheduled with?: Primary Care Provider      PLAN                                                        Future Appointments   Date Time Provider Department Center   6/13/2022  2:30 [facility]   6/13/2022  3:30 PM Cherelle Hurley MD [facility]         For any urgent concerns, please contact our 24 hour nurse triage line: [phone] ([phone])         Azeb Camarena MA

## 2022-03-11 NOTE — LETTER
[facility] CARE COORDINATION  [address]  [place] [facility] [address]      March 11, 2022      Efrem Ramos  [address] [facility] [address]      Dear Efrem,    I am a clinic community health worker who works with Danny Paige MD, MD at [facility]. I wanted to introduce myself and provide you with my contact information for you to be able to call me with any questions or concerns. Below is a description of clinic care coordination and how I can further assist you.      The clinic care coordination team is made up of a registered nurse,  and community health worker who understand the health care system. The goal of clinic care coordination is to help you manage your health and improve access to the health care system in the most efficient manner. The team can assist you in meeting your health care goals by providing education, coordinating services, strengthening the communication among your providers and supporting you with any resource needs.    Please feel free to contact me at [phone] with any questions or concerns. We are focused on providing you with the highest-quality healthcare experience possible and that all starts with you.     Sincerely,     CHASE Amaya  Clinic Care Coordination  [facility]: [place], [place], [facility], and [place] [facility]  Phone: [phone]

## 2022-03-11 NOTE — PROCEDURES
The patient was positively identified and informed consent was obtained (see the completed Affirmation of Consent for Bone Marrow Aspiration and/or Biopsy Procedure(s) form in the patient's chart). The patient was placed in the lateral position and the bony landmarks of the pelvis were identified. Medical staff reconfirmed the patient's name, date of birth and procedure. The skin over the posterior iliac crest was scrubbed and draped in a sterile fashion. The local area of the procedure was anesthetized with a total of 6 mL of 1% Lidocaine and a small incision was made.  The patient did receive conscious sedation.    Trephine bone marrow core(s) was/were obtained from the right posterior iliac crest. Bone marrow aspirate was obtained from the right posterior iliac crest for: morphology with possible immunophenotyping and/or cytogenetics and molecular diagnostics    Direct pressure was applied to the biopsy site with sterile gauze. The biopsy site was cleaned with alcohol and a sterile dressing was placed over the biopsy incision using a pressure bandage. The patient was then placed in the supine position to maintain pressure on the biopsy site. Post-procedure wound care instructions, including routine dressing instructions and analgesia, were given to the patient. The procedure was completed without complication.

## 2022-03-11 NOTE — TELEPHONE ENCOUNTER
Azeb Camarena, MA  [facility] Triage 26 minutes ago (2:46 PM)         Hi, Patient's son is requesting a referral for a Pain Clinic and a Lung doctor. Please call patient's son to discuss.  Thank you.    Routing comment      Called Efrem on C2C     Has not revisited lung issues for a long time. Had bone biopsy so was speaking with the oncologist regarding lungs. Pt not dx with multiple myeloma but counts are elevating and wants to see him every 3 months instead of 6 months - to keep eye on readings - Dr Lawrence      Suffers from fatigue and other things - poor appetite     Oncologist advised heart and lungs are more likely issues. Saw cardiologist more recently than pulmonology - advised pt refer to pulm again     Dr Lawrence noted increased stomach breathing - pt has COPD     Pain - back is really bad -wants back pain addressed for long time, not heart valve candidate so advised against back surgery. Arteries are very constricted not candidate for any surgery     Son attends doctor visits with pt - and never complains of bad pain - but he is with patient daily and they struggle daily. Is on APAP only. Son spoke with provider advised putting scheduled APAP as he never asks for it - but wants to know if pt has any other options - wants to potentially look into medicinal marijuana     Also dealing with depression, lost spouse. Does not want to classify as end of life because pt will dwell on that     He is requesting that PCP place referrals for pain clinic and pulmonology - he would like a call back once these referrals are signed     Bella BURNS, Triage RN  [facility]

## 2022-03-11 NOTE — RESULT ENCOUNTER NOTE
Nik Topete,    I have had the opportunity to review your recent results and an interpretation is as follows:  Your follow-up CT scan shows severe emphysema, and pulmonary fibrosis but there is no evidence of any recurrence of pneumonia.   I would recommend a follow-up with pulmonology     Sincerely,  Danny Paige MD

## 2022-03-18 NOTE — TELEPHONE ENCOUNTER
MyChart message was reviewed    Bella BURNS, Triage RN  [facility]

## 2022-03-21 NOTE — TELEPHONE ENCOUNTER
Home care called for continuation orders:     Skilled nursing 1x/8 weeks     Verbal given on visits     Home care also asking also for mental health referral to psychiatry for depression and Tino's-Harshad syndrome    Please advise    Stephan Bassett RN  [facility]       Appointments in Next Year    Jun 13, 2022  2:30 PM  Lab Peripheral with  PIV LAB  [facility] ([facility] ) [phone]   Jun 13, 2022  3:30 PM  Return Visit with Cherelle Hurley MD  [facility] ([facility] ) [phone]   Jun 29, 2022  1:00 PM  Pulmonary Function with CS PULMONARY FUNCTION  [facility] [phone]   Jun 29, 2022  2:00 PM  New Visit with Patric Hutchison MD  [facility] ([facility] ) [phone]        Stephan Bassett RN  [facility]

## 2022-03-24 NOTE — TELEPHONE ENCOUNTER
"Patient Contact    Attempt # 1    Was call answered?  No.  Triage to recall to ensure correct number. Writer unable to determine if number is for \"Chiquita\".     Sharon Zaragoza RN  [facility]    "

## 2022-03-24 NOTE — TELEPHONE ENCOUNTER
Returned call. OK'd requested orders.  Orders will be faxed to PCP for review and signature.   Also shared scheduling number for Mental Health referral  Patrica Mejía RN

## 2022-03-25 NOTE — PROGRESS NOTES
Alfredo is a 76 year old who is being evaluated via a billable telephone visit.      What phone number would you like to be contacted at? [phone]  How would you like to obtain your AVS? Malinda Skelton   Alfredo is a 76 year old who presents for the following health issues     HPI       Chief Complaint   Patient presents with     Recheck Medication     Knee Pain  Groin Strain   Efrem Ramos has been working with physical therapy since his recent verebral fracture.  He did have groin strain with physical therapy and improved with ice/heat.  Has been continuing with physical therapy.   His son is also encouraging him to use his exercise bike as best as he can.  He is interested in considering medicinal marijuana.      Review of Systems   Constitutional, HEENT, cardiovascular, pulmonary, GI, , musculoskeletal, neuro, skin, endocrine and psych systems are negative, except as otherwise noted.      Objective           Vitals:  No vitals were obtained today due to virtual visit.    Physical Exam   healthy, alert and no distress  PSYCH: Alert and oriented times 3; coherent speech, normal   rate and volume, able to articulate logical thoughts, able   to abstract reason, no tangential thoughts, no hallucinations   or delusions  His affect is normal  RESP: No cough, no audible wheezing, able to talk in full sentences  Remainder of exam unable to be completed due to telephone visits    (I48.20) Chronic a-fib (H)  Comment: Continue apixaban - written treatment needed  Plan:     (G25.81) Restless legs syndrome (RLS)  Comment:   Plan: gabapentin (NEURONTIN) 300 MG capsule            (M80.00XD) Age-related osteoporosis with current pathological fracture with routine healing, subsequent encounter  Comment: Referral to pain clinic to discuss use medical marijuana   Plan: Pain Management Referral            (G89.29) Other chronic pain  Comment: as above - also continue diclofenac  Plan: Pain Management Referral                    Phone call duration: 22 minutes

## 2022-03-28 NOTE — TELEPHONE ENCOUNTER
called    Requesting delay in start of care for social work     Had an order for this week, but son said that won't work for pt, asking to see him next week instead (scheduled for 4/8/22)     To assist with community resources and long term planning and to communicate with [facility] adult protective      Ok to give verbal ok for delayed visit for social work home care?    Bella BURNS, Triage RN  [facility]

## 2022-03-30 NOTE — TELEPHONE ENCOUNTER
Called back to Tonya and left a detailed message giving verbal ok per PCP on delayed social work visit    Bella BURNS, Triage RN  [facility]

## 2022-04-05 NOTE — PROGRESS NOTES
SUBJECTIVE: Efrem Ramos is a 76 year old male presenting with a chief complaint of red tender rt great toe.  Onset of symptoms was day(s) ago.  Course of illness is worsening.    Past Medical History:   Diagnosis Date     Atrial fibrillation (H)     amiodarone therapy discontinued due to pulmonary toxicity     Atrial flutter (H)      Benign essential hypertension 11/20/2018     Cancer (H) vocal cord     Carpal tunnel syndrome     abstracted 7/3/02.     Coronary artery disease involving native coronary artery of native heart without angina pectoris 05/08/2020    Cath 5/28/2020: patent stents; Cath 5/18/2020: ISABEL x4 to RCA; Cath 3/2020: 1 vessel disease; Cath 2017: moderate 2 vessel disease     CVA (cerebral infarction) 05/05/2015     Demyelinating disease of central nervous system, unspecified (H)     abstracted 7/3/02. 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     Pneumonia 08/17/2016     PVD (peripheral vascular disease) (H)      Redundant colon     needs CT colonography     Shingles      SKIN DISORDERS NEC 03/15/2005     Sleep apnea      Sleep apnea      SVT (supraventricular tachycardia) (H)      Allergies   Allergen Reactions     Sulfa Drugs Difficulty breathing, Swelling and Hives     Adhesive Tape Blisters     Amiodarone Other (See Comments)     Developed  pleural effusion     Penicillins Other (See Comments)     Reaction occurred as a child  Other reaction(s): Hives     Social History     Tobacco Use     Smoking status: Former Smoker     Packs/day: 1.00     Years: 30.00     Pack years: 30.00     Types: Cigarettes     Quit date: 2013     Years since quittin.6     Smokeless tobacco: Never Used   Substance Use Topics     Alcohol use: Not Currently     Alcohol/week: 42.0 standard drinks     Types: 42 Standard drinks or equivalent per week       ROS:  SKIN: no rash  GI: no vomiting    OBJECTIVE:  BP (!) 80/50   Pulse 93   Temp (!) 96.4  F (35.8  C) (Tympanic)   Resp 16   Wt 60.8 kg (134 lb)   SpO2 98%   BMI 20.37 kg/m  GENERAL APPEARANCE: healthy, alert and no distress  SKIN: dorsum of rt gtreat IP toe redness and tender      ICD-10-CM    1. Cellulitis of toe of right foot  L03.031 cefuroxime (CEFTIN) 500 MG tablet     Orthopedic  Referral       Fluids/Rest, f/u if worse/not any better

## 2022-04-07 NOTE — LETTER
April 7, 2022    Efrem Ramos  [address] [facility] [facility] [address]    Dear Nile Topete to the [facility].  We are located at [address] [place] [address]. Your appointment has been scheduled on Wednesday May 4, 2022 at 11:00 AM with Ramesh Hall MD .    At your first visit, you will meet your team of caregivers who will help you to develop pain management strategies that will last a lifetime. You will meet with our support staff to review your insurance information, and collect your co-payment if required by your insurance company. You will also meet with a medical pain specialist and care coordinator who will assess your pain and develop a plan of care for your successful pain rehabilitation. You should expect to spend approximately 1 hour at your first visit with us. Usually, patients work with us for a period of 6-12 months, and eventually return to their primary doctor once their pain management has stabilized.      To help us make your visit go as smoothly as possible, please bring the following items with you on your visit:       Completed Pain Questionnaire enclosed in this packet.  If you do not bring the completed questionnaire, we may have to reschedule your appointment.    List of any medicines that you are currently taking or have been prescribed    Important NON-[place] medical information such as medical records or tests results (X-rays, or laboratory tests)    Your health insurance card    Financial resources to cover your co-payment or balance due at the time of service (cash, personal check, Visa, and MasterCard are acceptable methods of payment)     Due to the demand for new patient evaluations, you must notify the scheduling department 48 hours (2 days) in advance if you are not able to keep this appointment. Failure to do so could affect your ability to reschedule with  our clinic. Please be aware that we will not prescribe any medications at your first visit.     Please call [phone] with any questions regarding your appointment. We look forward to meeting you and working to address your health care needs.     Sincerely,    [facility]

## 2022-04-07 NOTE — TELEPHONE ENCOUNTER
Pain Management Center Referral      1. Confirmed address with patient? Yes  2. Confirmed phone number with patient? Yes  3. Confirmed referring provider? Yes  4. Is the PCP the same as the referring provider? Yes  5. Has the patient been to any previous pain clinics? Yes  (If yes, send REBEKAH with welcome letter)  6. Which insurance are we to bill for this appointment?  Medicare/ BCBS    7. Informed pt of cancellation (48 hour) policy? Yes    REGARDING OPIOID MEDICATIONS: We will always address appropriateness of opioid pain medications, but we generally will not automatically take on a prescribing role. When we do take on prescribing of opioids for chronic pain, it is in collaboration with the referring physician for an intermediate period of time (months), with an expectation that the primary physician or provider will assume the prescribing role if medications are effective at stable doses with demonstrated compliance. Therefore, please do not assume that your prescribing responsibilities end on the day of pain clinic consultation.  8. Informed pt of prescribing policy? Yes    9.Please be aware that once you are established with a pain provider and location, you will need to continue have all future visits with that provider and location. It is best to determine what location is the most convenient for you and schedule with that one.    ** PATIENT INFORMED OF THIS POLICY Yes      9. Referring Provider: Danny Paige     10. Criteria for Triage Eval:   -Missed/Failed 1st appointment? N/A     -Medication Focused? N/A     -Mental Health Concerns? (e.g. Recent psych hospitalization/snap shot)? N/A     -Active substance abuse? N/A     -Patient behaviors (e.g. Offensive language/raised voice)? N/A    Su BROWN    [facility] Pain Management

## 2022-04-07 NOTE — TELEPHONE ENCOUNTER
The Home Care/Assisted Living/Nursing Facility is calling regarding an established patient.  Has the patient seen Home Care in the past or is currently residing in Assisted Living or Nursing Facility? Yes.     Natasha calling from [facility] requesting the following orders that are within the Home Care, Assisted Living or Nursing Home Eval and Treatment standing order and can be signed as standing order signature required by RN.    Preferred Call Back Number: [phone], confidential     PT/OT/Speech Therapy   OT eval and treat. PT continuation of care [id number]. Verbal approval given per standing order and recent visit with PCP.     Any additional Orders:  Any order requested not stated above are outside of the standing order and must be routed to a licensed practitioner for approval.    No    Writer has verified Requestor will send fax to have orders signed.    Kiarra HAJI RN  [facility]

## 2022-04-19 NOTE — LETTER
4/19/2022         RE: Efrem Ramos  [address] [facility] [address]        Dear Colleague,    Thank you for referring your patient, Efrem Ramos, to the [facility]. Please see a copy of my visit note below.    Assessment:      ICD-10-CM    1. Hallux malleus, right  M20.31 [facility] Other (Comments) (Extra depth shoes)   2. PVD (peripheral vascular disease) (H)  I73.9 [facility] Other (Comments) (Extra depth shoes)        Plan:  Orders Placed This Encounter   Procedures     [facility] Other (Comments) (Extra depth shoes)       Discussed the etiology and treatment of the condition with the patient.    Rubbing on top of shoes from severe bunion / hallux malleus combined  PAD  CHF    Poor mobility      Return:  No follow-ups on file.    Sabina Ramirez DPM                Chief Complaint:     Patient presents with:  Right Foot - Toe Pain     right foot pain    HPI:  Efrem Ramos is a 76 year old year old male who presents for evaluation of foot pain.    Pain location-top of R great toe  Infected recently  Per pt's son, has poor blood flow so can't have surgeyr    WBC   Date Value Ref Range Status   02/16/2021 10.4 4.0 - 11.0 10e9/L Final     WBC Count   Date Value Ref Range Status   03/10/2022 11.9 (H) 4.0 - 11.0 10e3/uL Final   03/10/2022 12.0 (H) 4.0 - 11.0 10e3/uL Final       Hemoglobin A1C POCT   Date Value Ref Range Status   09/04/2015 5.3 4.3 - 6.0 % Final           Past Medical & Surgical History:  Past Medical History:   Diagnosis Date     Atrial fibrillation (H)     amiodarone therapy discontinued due to pulmonary toxicity     Atrial flutter (H)      Benign essential hypertension 11/20/2018     Cancer (H) vocal cord     Carpal tunnel syndrome     abstracted 7/3/02.     Coronary artery disease involving native coronary artery of native heart without angina pectoris 05/08/2020    Cath 5/28/2020:  patent stents; Cath 5/18/2020: ISABEL x4 to RCA; Cath 3/2020: 1 vessel disease; Cath 2017: moderate 2 vessel disease     CVA (cerebral infarction) 05/05/2015     Demyelinating disease of central nervous system, unspecified (H)     abstracted 7/3/02. 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     Pneumonia 08/17/2016     PVD (peripheral vascular disease) (H)      Redundant colon     needs CT colonography     Shingles      SKIN DISORDERS NEC 03/15/2005     Sleep apnea      Sleep apnea      SVT (supraventricular tachycardia) (H)       Past Surgical History:   Procedure Laterality Date     BIOPSY  brain 2002     BONE MARROW BIOPSY, BONE SPECIMEN, NEEDLE/TROCAR N/A 6/8/2017    Procedure: BIOPSY BONE MARROW;  UNILATERAL BONE MARROW BIOPSY (CONSCIOUS SEDATION) ;  Surgeon: Jamie Gonzales MD;  Location:  GI     BONE MARROW BIOPSY, BONE SPECIMEN, NEEDLE/TROCAR N/A 2/23/2022    Procedure: BIOPSY, BONE MARROW;  Surgeon: Carmelita Aguilera MD;  Location:  GI     CARDIAC CATHERIZATION  09/05/2017    2V CAD, IFR of RCA 0.95     CV CORONARY ANGIOGRAM N/A 3/23/2020    Procedure: Coronary Angiogram and right heart cath;  Surgeon: Gino Ferrer MD;  Location:  HEART CARDIAC CATH LAB     CV HEART CATHETERIZATION WITH POSSIBLE INTERVENTION N/A 5/28/2020    Procedure:  Heart Catheterization with Possible Intervention;  Surgeon: Larry Chawla MD;  Location:  HEART CARDIAC CATH LAB     CV HEART CATHETERIZATION WITH POSSIBLE INTERVENTION N/A 5/18/2020    Procedure: Heart Catheterization with Possible Intervention;  Surgeon: Gaurang Swenson MD;  Location:  HEART CARDIAC CATH LAB     CV INSTANTANEOUS WAVE-FREE RATIO N/A 3/23/2020    Procedure: Instantaneous Wave-Free Ratio;  Surgeon: Gino Ferrer MD;  Location:  HEART CARDIAC CATH LAB     CV PCI ATHERECTOMY ORBITAL N/A 5/18/2020    Procedure: Percutaneous Coronary Intervention Atherectomy Rotational;  Surgeon: Gaurang Swenson MD;  Location:  HEART CARDIAC CATH LAB     CV PCI STENT DRUG ELUTING N/A 5/18/2020    Procedure: Percutaneous Coronary Intervention Stent Drug Eluting;  Surgeon: Gaurang Swenson MD;  Location:  HEART CARDIAC CATH LAB     CV RIGHT HEART CATH MEASUREMENTS RECORDED N/A 5/28/2020    Procedure: Right Heart Cath;  Surgeon: Larry Chawla MD;  Location:  HEART CARDIAC CATH LAB     CV TEMPORARY PACEMAKER INSERTION N/A 5/18/2020    Procedure: Temporary Pacemaker Insertion;  Surgeon: Gaurang Swenson MD;  Location:  HEART CARDIAC CATH LAB     ESOPHAGOSCOPY, GASTROSCOPY, DUODENOSCOPY (EGD), COMBINED N/A 9/8/2018    Procedure: COMBINED ESOPHAGOSCOPY, GASTROSCOPY, DUODENOSCOPY (EGD), BIOPSY SINGLE OR MULTIPLE;;  Surgeon: Xander Marie MD;  Location:  GI     HEAD & NECK SURGERY       ORTHOPEDIC SURGERY      right arm ulna reset after injury     THORACOSCOPIC WEDGE RESECTION LUNG Right 8/2/2016    Procedure: THORACOSCOPIC WEDGE RESECTION LUNG;  Surgeon: Abdelrahman Noriega MD;  Location:  OR     [facility] NONSPECIFIC PROCEDURE  as a child    T & A. abstracted 7/3/02.     ZZC NONSPECIFIC PROCEDURE  early    CTR. abstracted 7/3/02.      Family History   Problem Relation Age of Onset     Blood Disease Mother         Anemia     Cardiovascular Father       "Cancer - colorectal Maternal Grandfather      Hypertension Brother      Diabetes Sister 5        Constance Diabetes passed at 36     Respiratory Other         Lung Cancer        Social History:  ?  History   Smoking Status     Former Smoker     Packs/day: 1.00     Years: 30.00     Types: Cigarettes     Quit date: 7/26/2013   Smokeless Tobacco     Never Used     History   Drug Use No     Social History    Substance and Sexual Activity      Alcohol use: Not Currently        Alcohol/week: 42.0 standard drinks        Types: 42 Standard drinks or equivalent per week      Allergies:  ?   Allergies   Allergen Reactions     Sulfa Drugs Difficulty breathing, Swelling and Hives     Adhesive Tape Blisters     Amiodarone Other (See Comments)     Developed pleural effusion     Cephalosporins      Penicillins Other (See Comments)     Reaction occurred as a child  Other reaction(s): Hives        Medications:    Current Outpatient Medications   Medication     acetaminophen (TYLENOL) 325 MG tablet     apixaban ANTICOAGULANT (ELIQUIS) 5 MG tablet     atorvastatin (LIPITOR) 40 MG tablet     calcium carbonate 600 mg-vitamin D 400 units (CALTRATE) 600-400 MG-UNIT per tablet     Calcium Carbonate-Vit D-Min (CALCIUM 600+D PLUS MINERALS) 600-400 MG-UNIT TABS     clopidogrel (PLAVIX) 75 MG tablet     diclofenac (VOLTAREN) 1 % topical gel     digoxin (LANOXIN) 125 MCG tablet     gabapentin (NEURONTIN) 300 MG capsule     meclizine (ANTIVERT) 25 MG tablet     melatonin 1 MG TABS tablet     mirtazapine (REMERON) 15 MG tablet     multivitamin (OCUVITE) TABS     senna-docusate (SENOKOT-S/PERICOLACE) 8.6-50 MG tablet     No current facility-administered medications for this visit.       Physical Exam:  ?  Vitals:  BP 98/60   Ht 1.727 m (5' 8\")   Wt 60.8 kg (134 lb)   BMI 20.37 kg/m     General:  WD/WN, in NAD.  A&O x3.  Dermatologic:    Skin is not intact, open lesions present dorsal HIPJ .   Skin texture, turgor is abnormal, thin " /atrophic.  Vascular:  Pulses not palpable bilateral.  Digital capillary refill time delayed right.  Skin temperature is cool right.  Generalized edema- none bilateral.  Focal edema- mild forefoot right.  Neurologic:    Gross sensation normal.  Gait and balance abnormal, walker use.  Musculoskeletal:  Maximal pain to palpation of HIPJ great toe right.  Bunion, hallux malleus R  musle strength 5/5  foot and ankle bilateral.                    Again, thank you for allowing me to participate in the care of your patient.        Sincerely,        Sabina Ramirez DPM

## 2022-04-19 NOTE — PATIENT INSTRUCTIONS
PATIENT INSTRUCTIONS - Podiatry / Foot & Ankle Surgery    [facility]  [facility]  [address]  [place] [address]  Phone: [phone]  Fax: [phone] [facility]  [address]  [place] [address]  Phone: [phone]   Fax: [phone]   [facility]  [address]  [place] [address]  Phone: [phone]  Fax: [phone] [facility]  [address]  [place] [address]  Phone: [phone]   Fax: [phone]   [facility]   [address]  [place] [address]  Phone: [phone]  Fax: [phone] * Please call any location listed to make an appointment for a casting/fitting. Your referral was sent to their central office and they will all have the order on file.

## 2022-04-19 NOTE — PROGRESS NOTES
Assessment:      ICD-10-CM    1. Hallux malleus, right  M20.31 [facility] DME Other (Comments) (Extra depth shoes)   2. PVD (peripheral vascular disease) (H)  I73.9 [facility] Other (Comments) (Extra depth shoes)        Plan:  Orders Placed This Encounter   Procedures     [facility] Other (Comments) (Extra depth shoes)       Discussed the etiology and treatment of the condition with the patient.    Rubbing on top of shoes from severe bunion / hallux malleus combined  PAD  CHF    Poor mobility      Return:  No follow-ups on file.    Sabina Ramirez DPM                Chief Complaint:     Patient presents with:  Right Foot - Toe Pain     right foot pain    HPI:  Efrem Ramos is a 76 year old year old male who presents for evaluation of foot pain.    Pain location-top of R great toe  Infected recently  Per pt's son, has poor blood flow so can't have surgeyr    WBC   Date Value Ref Range Status   02/16/2021 10.4 4.0 - 11.0 10e9/L Final     WBC Count   Date Value Ref Range Status   03/10/2022 11.9 (H) 4.0 - 11.0 10e3/uL Final   03/10/2022 12.0 (H) 4.0 - 11.0 10e3/uL Final       Hemoglobin A1C POCT   Date Value Ref Range Status   09/04/2015 5.3 4.3 - 6.0 % Final           Past Medical & Surgical History:  Past Medical History:   Diagnosis Date     Atrial fibrillation (H)     amiodarone therapy discontinued due to pulmonary toxicity     Atrial flutter (H)      Benign essential hypertension 11/20/2018     Cancer (H) vocal cord     Carpal tunnel syndrome     abstracted 7/3/02.     Coronary artery disease involving native coronary artery of native heart without angina pectoris 05/08/2020    Cath 5/28/2020: patent stents; Cath 5/18/2020: ISABEL x4 to RCA; Cath 3/2020: 1 vessel disease; Cath 2017: moderate 2 vessel disease     CVA (cerebral infarction) 05/05/2015     Demyelinating disease of central nervous system, unspecified (H)     abstracted 7/3/02. 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     Pneumonia 08/17/2016     PVD (peripheral vascular disease) (H)      Redundant colon     needs CT colonography     Shingles      SKIN DISORDERS NEC 03/15/2005     Sleep apnea      Sleep apnea      SVT (supraventricular tachycardia) (H)       Past Surgical History:   Procedure Laterality Date     BIOPSY  brain 2002     BONE MARROW BIOPSY, BONE SPECIMEN, NEEDLE/TROCAR N/A 6/8/2017    Procedure: BIOPSY BONE MARROW;  UNILATERAL BONE MARROW BIOPSY (CONSCIOUS SEDATION) ;  Surgeon: Jamie Gonzales MD;  Location:  GI     BONE MARROW BIOPSY, BONE SPECIMEN, NEEDLE/TROCAR N/A 2/23/2022    Procedure: BIOPSY, BONE MARROW;  Surgeon: Carmelita Aguilera MD;  Location:  GI     CARDIAC CATHERIZATION  09/05/2017    2V CAD, IFR of RCA 0.95     CV CORONARY ANGIOGRAM N/A 3/23/2020    Procedure: Coronary Angiogram and right heart cath;  Surgeon: Gino Ferrer MD;  Location:  HEART CARDIAC CATH LAB     CV HEART CATHETERIZATION WITH POSSIBLE INTERVENTION N/A 5/28/2020    Procedure: Heart Catheterization with Possible Intervention;  Surgeon: Larry Chawla MD;  Location:  HEART CARDIAC CATH LAB     CV HEART CATHETERIZATION WITH POSSIBLE INTERVENTION N/A 5/18/2020    Procedure: Heart Catheterization with Possible Intervention;  Surgeon:  Gaurang Swenson MD;  Location:  HEART CARDIAC CATH LAB     CV INSTANTANEOUS WAVE-FREE RATIO N/A 3/23/2020    Procedure: Instantaneous Wave-Free Ratio;  Surgeon: Gino Ferrer MD;  Location:  HEART CARDIAC CATH LAB     CV PCI ATHERECTOMY ORBITAL N/A 5/18/2020    Procedure: Percutaneous Coronary Intervention Atherectomy Rotational;  Surgeon: Gaurang Swenson MD;  Location:  HEART CARDIAC CATH LAB     CV PCI STENT DRUG ELUTING N/A 5/18/2020    Procedure: Percutaneous Coronary Intervention Stent Drug Eluting;  Surgeon: Gaurang Swenson MD;  Location:  HEART CARDIAC CATH LAB     CV RIGHT HEART CATH MEASUREMENTS RECORDED N/A 5/28/2020    Procedure: Right Heart Cath;  Surgeon: Larry Chawla MD;  Location:  HEART CARDIAC CATH LAB     CV TEMPORARY PACEMAKER INSERTION N/A 5/18/2020    Procedure: Temporary Pacemaker Insertion;  Surgeon: Gaurang Swenson MD;  Location:  HEART CARDIAC CATH LAB     ESOPHAGOSCOPY, GASTROSCOPY, DUODENOSCOPY (EGD), COMBINED N/A 9/8/2018    Procedure: COMBINED ESOPHAGOSCOPY, GASTROSCOPY, DUODENOSCOPY (EGD), BIOPSY SINGLE OR MULTIPLE;;  Surgeon: Xander Marie MD;  Location:  GI     HEAD & NECK SURGERY       ORTHOPEDIC SURGERY      right arm ulna reset after injury     THORACOSCOPIC WEDGE RESECTION LUNG Right 8/2/2016    Procedure: THORACOSCOPIC WEDGE RESECTION LUNG;  Surgeon: Abdelrahman Noriega MD;  Location:  OR     [facility] NONSPECIFIC PROCEDURE  as a child    T & A. abstracted 7/3/02.     ZZC NONSPECIFIC PROCEDURE  early    CTR. abstracted 7/3/02.      Family History   Problem Relation Age of Onset     Blood Disease Mother         Anemia     Cardiovascular Father      Cancer - colorectal Maternal Grandfather      Hypertension Brother      Diabetes Sister 5        Juvinile Diabetes passed at 36     Respiratory Other         Lung Cancer        Social History:  ?  History   Smoking Status     Former Smoker     Packs/day: 1.00      "Years: 30.00     Types: Cigarettes     Quit date: 7/26/2013   Smokeless Tobacco     Never Used     History   Drug Use No     Social History    Substance and Sexual Activity      Alcohol use: Not Currently        Alcohol/week: 42.0 standard drinks        Types: 42 Standard drinks or equivalent per week      Allergies:  ?   Allergies   Allergen Reactions     Sulfa Drugs Difficulty breathing, Swelling and Hives     Adhesive Tape Blisters     Amiodarone Other (See Comments)     Developed pleural effusion     Cephalosporins      Penicillins Other (See Comments)     Reaction occurred as a child  Other reaction(s): Hives        Medications:    Current Outpatient Medications   Medication     acetaminophen (TYLENOL) 325 MG tablet     apixaban ANTICOAGULANT (ELIQUIS) 5 MG tablet     atorvastatin (LIPITOR) 40 MG tablet     calcium carbonate 600 mg-vitamin D 400 units (CALTRATE) 600-400 MG-UNIT per tablet     Calcium Carbonate-Vit D-Min (CALCIUM 600+D PLUS MINERALS) 600-400 MG-UNIT TABS     clopidogrel (PLAVIX) 75 MG tablet     diclofenac (VOLTAREN) 1 % topical gel     digoxin (LANOXIN) 125 MCG tablet     gabapentin (NEURONTIN) 300 MG capsule     meclizine (ANTIVERT) 25 MG tablet     melatonin 1 MG TABS tablet     mirtazapine (REMERON) 15 MG tablet     multivitamin (OCUVITE) TABS     senna-docusate (SENOKOT-S/PERICOLACE) 8.6-50 MG tablet     No current facility-administered medications for this visit.       Physical Exam:  ?  Vitals:  BP 98/60   Ht 1.727 m (5' 8\")   Wt 60.8 kg (134 lb)   BMI 20.37 kg/m     General:  WD/WN, in NAD.  A&O x3.  Dermatologic:    Skin is not intact, open lesions present dorsal HIPJ .   Skin texture, turgor is abnormal, thin /atrophic.  Vascular:  Pulses not palpable bilateral.  Digital capillary refill time delayed right.  Skin temperature is cool right.  Generalized edema- none bilateral.  Focal edema- mild forefoot right.  Neurologic:    Gross sensation normal.  Gait and balance abnormal, walker " use.  Musculoskeletal:  Maximal pain to palpation of HIPJ great toe right.  Bunion, hallux malleus R  musle strength 5/5  foot and ankle bilateral.

## 2022-05-04 NOTE — PROGRESS NOTES
[facility] Consultation    Date of visit: 5/3/2022      Assessment:  Efrem Ramos is a 76 year old with past medical history including: CAD (s/p cath and ISABEL in 2020), HTN, HLD, A. Fib, Mitral valve stenosis, Aortic stenosis with pulmonary hypertension, MEL, COPD, PVD, Osteoporosis who presents for evaluation and treatment of the following chronic pain conditions:    1. Vertebral compression fractures: Efrem had an L1 compression fracture in 2018 and had had gradually worsening axial low back pain since that time. Recent thoracic MRI shows subacute compression fractures at T12 and T11. Endocrinology and IR referrals placed today for consideration for vertebroplasty as well as treatment for his osteoporosis.    2. Chronic neck pain: Likely due to cervical spondylosis and facet arthropathy with overlying myofascial pain. They currently have home care PT but are not regularly following the exercise regimen recommended. Discussed the importance of regular stretches/exercises to treat myofascial and arthritic pain and they were agreeable to trying this. Also recommended facet joint injections which were ordered.    3. Chronic bilateral shoulder pain: likely due to deconditioning, reduced mobility exacerbating underlying glenohumeral OA.    4. Chronic pain: Alfredo has limited options to treat his chronic pain. He is on anticoagulation and cannot take NSAIDs. Given his respiratory and cardiac status, would like to avoid opioids. Patient's son brought up medical cannabis and I think this would be reasonable to try, recommended they try the oral oil drops.      Plan:  The following recommendations were given to the patient. Diagnosis, treatment options, risks, benefits, and alternatives were discussed, and all questions were answered. The patient expressed understanding of the plan for management.     I am recommending a multidisciplinary treatment plan to help this patient  better manage his pain.  This includes:     1. Physical Therapy: continue home PT.  2. Clinical Health Pain Psychologist: will discuss at follow-up.   1. Therapy can help reduce physical and psychosocial triggers or reinforcers of pain by adapting thoughts, feelings and behaviors to reduce symptoms and increase quality of life.  Evidence indicates that the practice of relaxation, meditation, and mindfulness techniques can significantly affect pain levels and overall well being.  3. Self Care Recommendations: Gentle progressive exercise that does not increase pain - gradually increase daily walking program.  Take mini breaks - 5 minutes of mindfullness a couple times a day.   4. Diagnostic Studies: none, reviewed recent MRIs.  5. Medication Management:   1. Titrate gabapentin to 300/300/600.  2. Acetaminophen 650mg qid prn  3. Medical cannabis certification completed.  6. Further procedures recommended: cervical facet injections   7. Referrals:   1. Endocrinology for osteoporosis treatment.  2. IR to consider vertebroplasty.  8. Follow up: 6 weeks.    Ramesh Hall DO  [facility]          Reason for consultation:    Efrem JULIA Ramos is a 76 year old male who is seen in consultation today at the request of his primary care physician, Danny Paige.     Consultation and Evaluation for: chronic pain    Review of [place] Prescription Monitoring Program (): Today I have also reviewed the patient's history of controlled substance use, as provided by [place] licensed pharmacies and prescriber dispensers.     Review of Pain Questionnaire: Please see the [facility] [place] health questionnaire, which the patient completed and reviewed with me in detail.    Review of Electronic Chart: Today I have also reviewed available medical information in the patient's medical record at [place] ([facility] including relevant provider notes, laboratory work, and imaging.     Efrem DUMONT  Richard has not been seen at a pain clinic in the past.      Chief Complaint:    No chief complaint on file.      Pain history:  Beni Ramos is a 76 year old male who presents for initial evaluation of chief pain complaint of chronic pain.     Over the past 2-3 years beni has had a few falls, last about 3-4 weeks ago. He feels these have contributed to his lumbar and thoracic compression fractures. In 2018 he had a vertebroplasty in October 2018. He isn't sure if this helped because he had a fall shortly after. He has had worsening back pain and limitations in mobility over the past year. Recently he was hospitalized with pneumonia and was staying in TCU for a month after this. He did PT to regain strength and mobility but this was difficult because of his chronic right toe fracture, he developed a non healing wound on this toe as well. This is now healing.    Beni reports a long standing history of bilateral neck pain. This has started to become more of an issue recently as their mobility has become more limited due to his compression fractures. They have bilateral shoulder pain as well but deny any radicular pain in the upper extremities.    After he had stents placed for CAD in 2020, he was very sick. He wasn't eating well and lost a lot of weight, strength and energy.    He has home care orders right now and has PT weekly. He isn't consistent about doing the exercises.    Onset: 5+ years  Location/Radiation: neck, mid back, shoulders  Quality: aching, severe, stiffness  Severity/Intensity: 9/10 on average  Aggravating factors include: activity, sitting, standing  Relieving factors include: lying down  Red Flags: The patient denies bowel or bladder incontinence, parasthesias, saddle anesthesia, unintentional weight loss, or fever/chills/sweats.         Pain Treatments:  1. Medications:       Current pain medications:  -Gabapentin 600mg HS - for RLS         Previous pain medications:  -none  2. Physical  Therapy: doing right now, not doing exercises regularly     TENS unit: nt  3. Pain psychology: nt  4. Surgery: vertebrplasty 2018  5. Injections: none  6. Alternative Therapies:    Chiropractic: nt    Acupuncture: nt      Diagnostic tests:  MRI of Cervical and Thoracic spine was completed on 1/14/22 was reviewed with the patient and shows:                                            IMPRESSION:  1.  Acute to subacute moderate to severe compression fracture T11 vertebral body with no evidence of canal compromise.     2.  Acute on chronic severe compression fracture T12 vertebral body with no evidence of canal compromise.     3.  Stable severe chronic L1 compression fracture with cement material within.     4.  No abnormal signal or abnormal enhancement of thoracic spinal cord.     5.  No significant canal compromise or significant neural foraminal narrowing throughout thoracic spine.         C2-C3: There is a slight decrease in disc space height and signal. There is a diffuse annular bulge but no evidence of canal compromise. There is facet arthropathy and uncinate spurring leading to mild right neural foraminal narrowing.      C3-C4: There is a significant loss of disc space height and signal. There is a broad posterior disc osteophyte complex more prominent to the posterior lateral regions. This does lead to mild canal compromise. There is facet arthropathy and uncinate   spurring leading to significant left greater than right neural foraminal narrowing.      C4-C5: There is a significant loss of disc space height and signal. There is a broad posterior disc osteophyte complex more prominent to the posterior lateral regions. This does lead to mild canal compromise. There is uncinate spurring and facet   arthropathy leading to significant right neural foraminal narrowing and moderate left neural foraminal narrowing.      C5-C6: There is a significant loss of disc space height and signal. There is a minimal posterior  disc osteophyte complex but no evidence of canal compromise. There is uncinate spurring and facet arthropathy leading to moderate bilateral neural foraminal   narrowing.      C6-C7: There is a moderate loss of disc space height and signal. There is a diffuse annular bulge more prominent to the posterior lateral regions but there is no evidence of canal compromise. There is facet arthropathy but the neural foramen are patent   bilaterally.      C7-T1: There is a mild loss of disc space height and signal. There is a mild diffuse annular bulge but no evidence of canal compromise. There is facet arthropathy but the neural foramen are patent bilaterally.                                                                      IMPRESSION:  1.  No abnormal signal or abnormal enhancement cervical spinal cord.  2.  Prominent degenerative changes leading to canal compromise or neural foraminal narrowing at multiple levels as described above.      MR LSpine from 9/13/20:    MRI LUMBAR SPINE WITHOUT CONTRAST   9/13/2020 5:02 PM      HISTORY: Vertebroplasty. Age-related osteoporosis with current  pathological fracture, vertebra(e), initial encounter for fracture  (H). T12 compression fracture (H).      TECHNIQUE: Multiplanar, multisequence MRI of the lumbar spine without  contrast.      COMPARISON: Lumbar MRI March 4, 2020      FINDINGS: There are 5 lumbar-type vertebral bodies assumed for the  purposes of this dictation. The distal spinal cord and cauda equina  appear normal.      Levoconvex curvature of the lumbar spine redemonstrated centered at  L3. Minimal degenerative retrolisthesis redemonstrated at L3-4 and  L4-5. 3 to 4 mm degenerative anterolisthesis at L5-S1. Alignment  unchanged. Redemonstration of T12 compression fracture with moderate  to severe height loss. No significant progression. Residual marrow  edema is present. Redemonstration of upper posterior cortex  retropulsion measuring 5 mm AP. Slight indentation of the  ventral  thecal sac with borderline canal stenosis. Adjacent neural foramina  unremarkable.     Redemonstration of L1 compression fracture deformity with slight  retropulsion. Post vertebroplasty change. No evidence for fracture  progression or evidence for marrow edema.     No new fractures.     Type II Modic endplate signal changes at L3-4 and L4-5. Sacral canal  perineural cysts redemonstrated. There are no destructive osseous  lesions.     Level by level as follows:      T11-T12: Disc desiccation. Patent foramina and central canal at the  level of the disc. Retropulsed bone at T12 with borderline central  canal stenosis.     T12-L1: Disc desiccation and left eccentric disc bulge. Mild  retropulsed bone. Borderline central canal narrowing unchanged. Patent  neural foramina. Mild facet arthropathy.      L1-L2: Disc desiccation and broad-based disc bulge. Patent central  canal and foramen. Unremarkable facets.     L2-L3: Disc height loss and minimal disc bulge. Patent central canal  and foramina. Mild facet arthropathy.      L3-L4: Disc desiccation, height loss and broad-based disc bulge  osteophyte. Mild facet arthropathy. Mild spinal central canal  stenosis. Slightly prominent posterior epidural fat. Moderate right  foraminal stenosis.      L4-L5: Disc osteophyte complex. Moderate facet and ligamentous  degenerative changes. Prominent epidural fat. Mild disc height loss.  Moderate left lateral recess stenosis with left L5 nerve impingement.  Mild foraminal stenosis.      L5-S1: Disc desiccation and broad-based disc bulge. Facet and  ligamentous degenerative changes. Moderate to severe left foraminal  stenosis. Patent central canal.                                                                       IMPRESSION:    1. T12 upper and lower endplate fractures redemonstrated. No  significant progression. Residual marrow edema is present and stable.  2. L1 fracture status post vertebroplasty. No progression or  marrow  edema.  3. No new fractures.  4. Levoconvex lumbar scoliosis.  5. Moderate, multilevel lumbar spondylosis. No significant interval  change.     MILADYS MARIE MD      Labs:   Lab Results   Component Value Date    WBC 11.9 03/10/2022    WBC 12.0 03/10/2022    WBC 10.4 02/16/2021     Lab Results   Component Value Date    RBC 3.67 03/10/2022    RBC 3.56 03/10/2022    RBC 3.83 02/16/2021     Lab Results   Component Value Date    HGB 11.6 03/10/2022    HGB 11.4 03/10/2022    HGB 11.8 02/16/2021     Lab Results   Component Value Date    HCT 36.7 03/10/2022    HCT 36.5 03/10/2022    HCT 37.5 02/16/2021     Lab Results   Component Value Date     03/10/2022     03/10/2022    MCV 98 02/16/2021     Lab Results   Component Value Date    MCH 31.6 03/10/2022    MCH 32.0 03/10/2022    MCH 30.8 02/16/2021     Lab Results   Component Value Date    MCHC 31.6 03/10/2022    MCHC 31.2 03/10/2022    MCHC 31.5 02/16/2021     Lab Results   Component Value Date    RDW 13.5 03/10/2022    RDW 13.5 03/10/2022    RDW 15.5 02/16/2021     Lab Results   Component Value Date     03/10/2022     03/10/2022     02/16/2021     Last Comprehensive Metabolic Panel:  Sodium   Date Value Ref Range Status   03/10/2022 139 133 - 144 mmol/L Final   06/22/2021 140 133 - 144 mmol/L Final     Potassium   Date Value Ref Range Status   03/10/2022 3.8 3.4 - 5.3 mmol/L Final   06/22/2021 4.0 3.4 - 5.3 mmol/L Final     Chloride   Date Value Ref Range Status   03/10/2022 107 94 - 109 mmol/L Final   06/22/2021 109 94 - 109 mmol/L Final     Chloride (External) (External)   Date Value Ref Range Status   02/08/2022 104 98 - 109 mmol/L Final     Carbon Dioxide   Date Value Ref Range Status   06/22/2021 28 20 - 32 mmol/L Final     Carbon Dioxide (CO2)   Date Value Ref Range Status   03/10/2022 29 20 - 32 mmol/L Final     Anion Gap   Date Value Ref Range Status   03/10/2022 3 3 - 14 mmol/L Final   06/22/2021 3 3 - 14 mmol/L  Final     Glucose   Date Value Ref Range Status   03/10/2022 100 (H) 70 - 99 mg/dL Final   06/22/2021 85 70 - 99 mg/dL Final     Urea Nitrogen   Date Value Ref Range Status   03/10/2022 15 7 - 30 mg/dL Final   06/22/2021 13 7 - 30 mg/dL Final     Creatinine   Date Value Ref Range Status   03/10/2022 0.94 0.66 - 1.25 mg/dL Final   06/22/2021 0.97 0.66 - 1.25 mg/dL Final     GFR Estimate   Date Value Ref Range Status   03/10/2022 83 >60 mL/min/1.73m2 Final     Comment:     Effective December 21, 2021 eGFRcr in adults is calculated using the 2021 CKD-EPI creatinine equation which includes age and gender (Ab et al., NE, DOI: 10.1056/SNQDkr0812730)   06/22/2021 74 >60 mL/min/[1.73_m2] Final     Comment:     Non  GFR Calc  Starting 12/18/2018, serum creatinine based estimated GFR (eGFR) will be   calculated using the Chronic Kidney Disease Epidemiology Collaboration   (CKD-EPI) equation.       Calcium   Date Value Ref Range Status   03/10/2022 8.8 8.5 - 10.1 mg/dL Final   06/22/2021 8.7 8.5 - 10.1 mg/dL Final     Bilirubin Total   Date Value Ref Range Status   03/10/2022 0.5 0.2 - 1.3 mg/dL Final   02/16/2021 0.6 0.2 - 1.3 mg/dL Final     Alkaline Phosphatase   Date Value Ref Range Status   03/10/2022 100 40 - 150 U/L Final   02/16/2021 115 40 - 150 U/L Final     ALT   Date Value Ref Range Status   03/10/2022 16 0 - 70 U/L Final   02/16/2021 52 0 - 70 U/L Final     AST   Date Value Ref Range Status   03/10/2022 22 0 - 45 U/L Final   02/16/2021 37 0 - 45 U/L Final                 Past Medical History:  Past Medical History:   Diagnosis Date     Atrial fibrillation (H)     amiodarone therapy discontinued due to pulmonary toxicity     Atrial flutter (H)      Benign essential hypertension 11/20/2018     Cancer (H) vocal cord     Carpal tunnel syndrome     abstracted 7/3/02.     Coronary artery disease involving native coronary artery of native heart without angina pectoris 05/08/2020    Cath 5/28/2020:  patent stents; Cath 5/18/2020: ISABEL x4 to RCA; Cath 3/2020: 1 vessel disease; Cath 2017: moderate 2 vessel disease     CVA (cerebral infarction) 05/05/2015     Demyelinating disease of central nervous system, unspecified (H)     abstracted 7/3/02. 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     Pneumonia 08/17/2016     PVD (peripheral vascular disease) (H)      Redundant colon     needs CT colonography     Shingles      SKIN DISORDERS NEC 03/15/2005     Sleep apnea      Sleep apnea      SVT (supraventricular tachycardia) (H)        Past Surgical History:  Past Surgical History:   Procedure Laterality Date     BIOPSY  brain 2002     BONE MARROW BIOPSY, BONE SPECIMEN, NEEDLE/TROCAR N/A 6/8/2017    Procedure: BIOPSY BONE MARROW;  UNILATERAL BONE MARROW BIOPSY (CONSCIOUS SEDATION) ;  Surgeon: Jamie Gonzales MD;  Location:  GI     BONE MARROW BIOPSY, BONE SPECIMEN, NEEDLE/TROCAR N/A 2/23/2022    Procedure: BIOPSY, BONE MARROW;  Surgeon: Carmelita Aguilera MD;  Location:  GI     CARDIAC CATHERIZATION  09/05/2017    2V CAD, IFR of RCA 0.95     CV CORONARY ANGIOGRAM N/A 3/23/2020    Procedure: Coronary Angiogram and right heart cath;  Surgeon: Gino Ferrer MD;  Location:  HEART CARDIAC CATH LAB     CV HEART CATHETERIZATION WITH POSSIBLE INTERVENTION N/A  5/28/2020    Procedure: Heart Catheterization with Possible Intervention;  Surgeon: Larry Chawla MD;  Location:  HEART CARDIAC CATH LAB     CV HEART CATHETERIZATION WITH POSSIBLE INTERVENTION N/A 5/18/2020    Procedure: Heart Catheterization with Possible Intervention;  Surgeon: Gaurang Swenson MD;  Location:  HEART CARDIAC CATH LAB     CV INSTANTANEOUS WAVE-FREE RATIO N/A 3/23/2020    Procedure: Instantaneous Wave-Free Ratio;  Surgeon: Gino Ferrer MD;  Location:  HEART CARDIAC CATH LAB     CV PCI ATHERECTOMY ORBITAL N/A 5/18/2020    Procedure: Percutaneous Coronary Intervention Atherectomy Rotational;  Surgeon: Gaurang Swenson MD;  Location:  HEART CARDIAC CATH LAB     CV PCI STENT DRUG ELUTING N/A 5/18/2020    Procedure: Percutaneous Coronary Intervention Stent Drug Eluting;  Surgeon: Gaurang Swenson MD;  Location:  HEART CARDIAC CATH LAB     CV RIGHT HEART CATH MEASUREMENTS RECORDED N/A 5/28/2020    Procedure: Right Heart Cath;  Surgeon: Larry Chawla MD;  Location:  HEART CARDIAC CATH LAB     CV TEMPORARY PACEMAKER INSERTION N/A 5/18/2020    Procedure: Temporary Pacemaker Insertion;  Surgeon: Gaurang Swenson MD;  Location:  HEART CARDIAC CATH LAB     ESOPHAGOSCOPY, GASTROSCOPY, DUODENOSCOPY (EGD), COMBINED N/A 9/8/2018    Procedure: COMBINED ESOPHAGOSCOPY, GASTROSCOPY, DUODENOSCOPY (EGD), BIOPSY SINGLE OR MULTIPLE;;  Surgeon: Xander Marie MD;  Location:  GI     HEAD & NECK SURGERY       ORTHOPEDIC SURGERY      right arm ulna reset after injury     THORACOSCOPIC WEDGE RESECTION LUNG Right 8/2/2016    Procedure: THORACOSCOPIC WEDGE RESECTION LUNG;  Surgeon: Abdelrahman Noriega MD;  Location:  OR     [facility] NONSPECIFIC PROCEDURE  as a child    T & A. abstracted 7/3/02.     [facility] NONSPECIFIC PROCEDURE  early    CTR. abstracted 7/3/02.       Medications:  Current Outpatient Medications   Medication Sig Dispense Refill     acetaminophen  (TYLENOL) 325 MG tablet Take 325-650 mg by mouth every 6 hours as needed for mild pain        apixaban ANTICOAGULANT (ELIQUIS) 5 MG tablet Take 1 tablet (5 mg) by mouth 2 times daily 180 tablet 3     atorvastatin (LIPITOR) 40 MG tablet Take 1 tablet (40 mg) by mouth daily 90 tablet 3     calcium carbonate 600 mg-vitamin D 400 units (CALTRATE) 600-400 MG-UNIT per tablet Take 1 tablet by mouth 2 times daily        Calcium Carbonate-Vit D-Min (CALCIUM 600+D PLUS MINERALS) 600-400 MG-UNIT TABS Take 1 tablet by mouth       clopidogrel (PLAVIX) 75 MG tablet Take 1 tablet (75 mg) by mouth daily 90 tablet 1     diclofenac (VOLTAREN) 1 % topical gel Apply 4 g topically 4 times daily as needed for moderate pain 150 g 11     digoxin (LANOXIN) 125 MCG tablet Take 1 tablet (125 mcg) by mouth daily 90 tablet 3     gabapentin (NEURONTIN) 300 MG capsule Take 2 capsules (600 mg) by mouth At Bedtime 180 capsule 3     meclizine (ANTIVERT) 25 MG tablet Take 1 tablet (25 mg) by mouth 2 times daily       melatonin 1 MG TABS tablet Take 1 mg by mouth At Bedtime ALSO TAKING 1 MG PO HS PRN FOR INSOMNIA       mirtazapine (REMERON) 15 MG tablet Take 1 tablet (15 mg) by mouth At Bedtime 90 tablet 3     multivitamin (OCUVITE) TABS Take 1 tablet by mouth daily        senna-docusate (SENOKOT-S/PERICOLACE) 8.6-50 MG tablet Take 2 tablets by mouth 2 times daily as needed for constipation         Allergies:     Allergies   Allergen Reactions     Sulfa Drugs Difficulty breathing, Swelling and Hives     Adhesive Tape Blisters     Amiodarone Other (See Comments)     Developed pleural effusion     Cephalosporins      Penicillins Other (See Comments)     Reaction occurred as a child  Other reaction(s): Hives       Social History:  Home situation: lives with son in [place]  Tobacco use: quit in 2013  Drug use: denies  Alcohol use: drinks daily      Family history:  Family History   Problem Relation Age of Onset     Blood Disease Mother         Anemia      Cardiovascular Father      Cancer - colorectal Maternal Grandfather      Hypertension Brother      Diabetes Sister 5        Constance Diabetes passed at 36     Respiratory Other         Lung Cancer       Review of Systems:    POSTIVE IN BOLD  GENERAL: fever/chills, fatigue, general unwell feeling, weight loss.  HEAD/EYES:  headache, dizziness, or vision changes.    EARS/NOSE/THROAT:  Nosebleeds, hearing loss, sinus infection, earache, tinnitus.  IMMUNE:  Allergies, cancer, immune deficiency, or infections.  SKIN:  Urticaria, rash, hives  HEME/Lymphatic:   anemia, easy bruising, easy bleeding.  RESPIRATORY:  cough, wheezing, or shortness of breath  CARDIOVASCULAR/Circulation:  Extremity edema, syncope, hypertension, tachycardia, or angina.  GASTROINTESTINAL:  abdominal pain, nausea/emesis, diarrhea, constipation,  hematochezia, or melena.  ENDOCRINE:  Diabetes, steroid use,  thyroid disease or osteoporosis.  MUSCULOSKELETAL: neck pain, back pain, arthralgia, arthritis, or gout.  GENITOURINARY:  frequency, urgency, dysuria, difficulty voiding, hematuria or incontinence.  NEUROLOGIC:  weakness, numbness, paresthesias, seizure, tremor, stroke or memory loss.  PSYCHIATRIC:  depression, anxiety, stress, suicidal thoughts or mood swings.     Physical Exam:  Vitals:    05/04/22 1101   BP: 110/71   Pulse: 105     Exam:  Constitutional: Frail, appears stated age.  HEENT: Head atraumatic, normocephalic. Eyes without conjunctival injection or jaundice. Oropharynx clear.   Skin: No rash, lesions, or petechiae of exposed skin.   Extremities: Peripheral pulses intact.   Psychiatric/mental status: Alert, without lethargy or stupor. Speech fluent. Appropriate affect. Mood normal. Able to follow commands without difficulty.     Musculoskeletal exam:  Gait/Station/Posture: Sits slumped in wheel chair. Significant difficulty getting to standing froma sitting position, requires assist.    Cervical spine:  Range of motion moderately  reduced in all planes  Myofascial tenderness: bilateral paraspinals  Pain with ext/rot bialterally.  No focal spinous process tenderness.   Spurling's negative bilaterally.      Lumbar spine:  Range of motion moderately reduced in all planes   Rotation/ext to right: painful    Rotation/ext to left: painful   Myofascial tenderness:  Bilateral thoracic and lumbar paraspinals.    Shoulder exam: ROM mildly reduced in flexion and abduction.        Neurologic exam:  CN:  Cranial nerves 2-12 are grossly intact  Motor Strength:  Strength is 4/5 with shoulder abduction bilaterally, 3/5 with finger abduction. Otherwise 5/5 in the upper extremities.  In the lower extremities 4/5 with hip flexion, otherwise 5/5 and symmetric.             Reflexes:     Biceps C5:     R:  2/4 L: 2/4   Brachioradialis  C6: R:  2/4 L: 2/4   Triceps C7:  R:  2/4 L: 2/4   Patella L4:  R:  2/4 L: 2/4   Achilles S1:  R:  2/4 L: 2/4        Sensory:  (upper and lower extremities):   Light touch: normal       BILLING TIME DOCUMENTATION:   The total TIME spent on this patient on the date of the encounter/appointment was 75 minutes.      TOTAL TIME includes:   Time spent preparing to see the patient (reviewing records and tests)   Time spent face to face (or over the phone) with the patient  Time spent ordering tests, medications, procedures and referrals  Time spent Referring and communicating with other healthcare professionals   Time spent documenting clinical information in Epic     Ramesh Hall MD, [facility]

## 2022-05-04 NOTE — NURSING NOTE
PEG Score 5/4/2022   PEG Total Score 9         Parvin Pretty MA  [facility] [place]

## 2022-05-04 NOTE — Clinical Note
Crystal Delgado,  I saw Alfredo today for their chronic pain. I did certify for medical cannabis and will have them increase their gabapentin. I also placed referrals to endocrinology to see if he would benefit from bisphosphonate therapy, I also referred to IR to see if he is healthy enough for vertebroplasty.  Thanks for this referral,  Ramesh Hall DO [facility]

## 2022-05-04 NOTE — PATIENT INSTRUCTIONS
Increase gabapentin as follows:  1 tab= 300mg    AM   PM   Bedtime  300mg   0   600mg (2 tab).  After 4 days, increase as tolerated to the next line  300mg    300   600 (2 tab).     Call with any problems or when you are at this dose. We can prescribe 600mg tabs going forwards.       Avoid activities that require mental alertness or coordination until you realize the effects of gabapentin as it can cause dizziness, sleepiness, fatigue and incoordination.    Common side effects from gabapentin use includes: Dizziness, Sleepiness, Fatigue, Difficulty with coordination, Swelling and Nausea/vomiting.    2. I placed a referral to the interventional radiology team to discuss vertebroplasty options.    3. I placed a referral to endocrinology to discuss options for your osteoporosis.    4. I certified you for medical cannabis, I would recommend the oil drops for you.    5. I ordered cervical facet joint injections, you'll be called to schedule.    Take care,    Ramesh Hall [facility]      ----------------------------------------------------------------  Clinic Number:  [phone]   Call with any questions about your care and for scheduling assistance.   Calls are returned Monday through Friday between 8 AM and 4:30 PM. We usually get back to you within 2 business days depending on the issue/request.    If we are prescribing your medications:  For opioid medication refills, call the clinic or send a [employer] message 7 days in advance.  Please include:  Name of requested medication  Name of the pharmacy.  For non-opioid medications, call your pharmacy directly to request a refill. Please allow 3-4 days to be processed.   Per [place] State Law:  All controlled substance prescriptions must be filled within 30 days of being written.    For those controlled substances allowing refills, pickup must occur within 30 days of last fill.      We believe regular attendance is key to your success in our  program!    Any time you are unable to keep your appointment we ask that you call us at least 24 hours in advance to cancel.This will allow us to offer the appointment time to another patient.   Multiple missed appointments may lead to dismissal from the clinic.

## 2022-05-05 NOTE — TELEPHONE ENCOUNTER
Screening Questions for Radiology Injections:    Injection to be done at which interventional clinic site? [facility]    Remind [place] Pt's that Covid test is no longer required except for sedation procedure Pt's.    Instruct patient to arrive as directed prior to the scheduled appointment time:    [facility]: 30 minutes before      [place]: 30 minutes before; if IV needed 1 hour before     Procedure ordered by chi    Procedure ordered? cervical facet joint injections bilateral C3-4, C4-5      Transforaminal Cervical ALF -  [facility] does NOT have providers that do these- [facility] and [facility] do have providers that do    As a reminder, receiving steroids can decrease your body's ability to fight infection.   Would you still like to move forward with scheduling the injection?  Yes    What insurance would patient like us to bill for this procedure? Medicare/bc      Worker's comp or MVA (motor vehicle accident) -Any injection DO NOT SCHEDULE and route to Effie Staley.      NOZA insurance - For SI joint injections, DO NOT SCHEDULE and route Jada Garber.       ALL BCBS, [employer] and [employer]-Route to Jada for review DO NOT SCHEDULE      IF SCHEDULING IN [place] AND NEEDS A PA, IT IS OKAY TO SCHEDULE. [place] HANDLES THEIR OWN PA'S AFTER THE PATIENT IS SCHEDULED. PLEASE SCHEDULE AT LEAST 1 WEEK OUT SO A PA CAN BE OBTAINED.    Any chance of pregnancy? NO   If YES, do NOT schedule and route to RN pool    Is an  needed? No     Patient has a drive home? (mandatory) YES: ok    Is patient taking any blood thinners (That is: Coumadin, Warfarin, Jantoven, Pradaxa Xarelto, Eliquis, Edoxaban, Enoxaparin, Lovenox, Heparin, Arixtra, Fondaparinux, or Fragmin? OR Antiplatelet medication such as Plavix, Brilinta, or Effient? )? Yes - Eliquis- Plavix   If hold needed, do NOT schedule, route to RN pool     Is patient taking any aspirin products (includes Excedrin and Fiorinal)? Yes - Pt takes 650mg  daily; instructed to hold 0 day(s) prior to procedure.      If more than 325mg/day, OK to schedule; Instruct pt to decrease to less than 325 mg for 7 days AND route to RN pool    For CERVICAL procedures, hold all aspirin products for 6 days.     Tell pt that if aspirin product is not held for 6 days, the procedure WILL BE cancelled.      Does the patient have a bleeding or clotting disorder? No     If YES, okay to schedule AND route to RN nurse pool    For any patients with platelet count <100, must be forwarded to provider    Any allergies to contrast dye, iodine, shellfish, or numbing and steroid medications? No    If YES, add allergy information to appointment notes AND route to the RN pool     If ALF and Contrast Dye / Iodine Allergy? DO NOT SCHEDULE, route to RN pool    Allergies: Sulfa drugs, Adhesive tape, Amiodarone, Cephalosporins, and Penicillins     Is patient diabetic?  No  If YES, instruct them to bring their glucometer.    Does patient have an active infection or treated for one within the past week? No     Is patient currently taking any antibiotics?  No     For patients on chronic, preventative, or prophylactic antibiotics, procedures may be scheduled.     For patients on antibiotics for active or recent infection:antibiotic course must have been completed for 4 days    Is patient currently taking any steroid medications? (i.e. Prednisone, Medrol)  No     For patients on steroid medications, course must have been completed for 4 days    Is patient actively being treated for cancer or immunocompromised? No  If YES, do NOT schedule and route to RN pool     Are you able to get on and off an exam table with minimal or no assistance? Yes  If NO, do NOT schedule and route to RN pool    Are you able to roll over and lay on your stomach with minimal or no assistance? Yes  If NO, do NOT schedule and route to RN pool     Has the patient had a flu shot or any other vaccinations within 7 days before or after the  procedure.  No     Have you recently had a COVID vaccine or have plans to get it in the near future? No    If yes, explain that for the vaccine to work best they need to:       wait 1 week before and 1 week after getting Vaccine #1    wait 1 week before and 2 weeks after getting Vaccine #2    wait 1 week before and 2 weeks after getting Vaccine BOOSTER    If patient has concerns about the timing, send to RN pool     Does patient have an MRI/CT?  YES: MRI  Check Procedure Scheduling Grid to see if required.      Was the MRI done within the last 3 years?  Yes    If yes, where was the MRI done i.e.[facility], [facility], [place], [facility] etc?     [facility]      If no, do not schedule and route to RN pool    If MRI was not done at [place], [facility] or [facility] do NOT schedule and route to RN pool.      If pt has an imaging disc, the injection MAY be scheduled but pt has to bring disc to appt.     If they show up without the disc the injection cannot be done    Procedure Specific Instructions:      If celiac plexus block, informed patient NPO for 6 hours and that it is okay to take medications with sips of water, especially blood pressure medications  Not Applicable         If this is for a cervical procedure, informed patient that aspirin needs to be held for 6 days.   Not Applicable      Sedation, If Sedation is ordered for any procedure, Pt must be NPO for 6 hours prior to procedure  Not Applicable      If IV needed:    Do not schedule procedures requiring IV placement in the first appointment of the day or first appointment after lunch. Do NOT schedule at 0745, 0815 or 1245. OK    Instructed pt to arrive 30 minutes early for IV start if required. (Check Procedure Scheduling Grid)  Not Applicable    Reminders:      If you are started on any steroids or antibiotics between now and your appointment, you must contact us because the procedure may need to be cancelled.  Yes      For all procedures except  radiofrequency ablations (RFAs) and spinal cord stimulator (SCS) trials, informed patient:    IV sedation is not provided for this procedure.  If you feel that an oral anti-anxiety medication is needed, you can discuss this further with your referring provider or primary care provider.  The Pain Clinic provider will discuss specifics of what the procedure includes at your appointment.  Most procedures last 10-20 minutes.  We use numbing medications to help with any discomfort during the procedure.  Not Applicable      For patients 85 or older we recommend having an adult stay w/ them for the remainder of the day.   OK    Does the patient have any questions?  NO  Petra Staley  [facility]

## 2022-05-05 NOTE — TELEPHONE ENCOUNTER
No PA required, okay to schedule    Per [employer]:  This product does not require prior authorization for any service.      Jada HAJI    [facility]

## 2022-05-05 NOTE — TELEPHONE ENCOUNTER
Patient is requesting to schedule an injection with the [facility].     This would require the patient to hold:     [facility] Family Prac     Apixaban (Eliquis)         Hold for 3 days before, restart after 24 hours    [facility]- Clopidogrel (Plavix)         Hold 7 days prior to procedure, restart 12 hours after procedure      We are requesting your approval to hold the medication for this time frame.    Please keep call open and route back to the PAIN NURSE [[id number]] pool, Pt will be scheduled by Pain clinic after hold approved.    PROVIDER REMINDER:  For Coumadin, please also route to Pt's INR clinic     If hold approved, we will contact the patient for scheduling.    Thank you.

## 2022-05-06 NOTE — TELEPHONE ENCOUNTER
Received a call from CARMEN Kaur [facility], requesting verbal order for continued PT visits.  Frequency: 1 time every other week for 8 weeks  Reason: pain management and functional strengthening    Verbal order given.    Thu Nagel RN

## 2022-05-16 NOTE — TELEPHONE ENCOUNTER
Patient is requesting to schedule an injection with the [facility].     This would require the patient to hold:                 Apixaban (Eliquis)         Hold for 3 days before, restart after 24 hours      We are requesting your approval to hold the medication for this time frame.    Please keep call open and route back to the PAIN NURSE [[id number]] pool, Pt will be scheduled by Pain clinic after hold approved.    PROVIDER REMINDER:  For Coumadin, please also route to Pt's INR clinic     If hold approved, we will contact the patient for scheduling.    Thank you.    Lisa ALMEIDA, RN Care Coordinator  [facility]  [facility] Management

## 2022-05-17 NOTE — TELEPHONE ENCOUNTER
[facility] does not manage Eliquis.    .Patricia Davis RN   [facility]

## 2022-05-17 NOTE — TELEPHONE ENCOUNTER
Called to pt and son. [facility] for 6/6/22    Plavix hold starts: May 29th and ends June 6th PM    Eliquis hold starts: June 2nd and ends June 7th     Pt and son express understanding    Will keep encounter open at this time for pt communication and nursing response.      Nvaa GREEN RN Care Coordinator  [facility]

## 2022-05-27 NOTE — PROGRESS NOTES
[facility]    Hematology/Oncology Established Patient Follow-up Note      Today's Date: 6/13/2022    Reason for Follow-up:  MGUS, IgG kappa type.     HISTORY OF PRESENT ILLNESS: Efrem Ramos is a 76 year old male who presents for follow-up of MGUS and vitamin B12 deficiency anemia.      --He was previously followed by Dr. Alan Solomon for MGUS in 2013 when his M-spike was 0.6 g/dL.  He then transferred his care to Dr. Shae Aldana around 2015.    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  For his vitamin B12 deficiency anemia, he received oral B12 2000 mcg once per week.  --1/10/2022: Hospitalized at [facility] for pneumonia. M-spike increased from 0.6 g/dL (2/16/2021) to 1.3 g/dL (1/12/2022). IgG increased from 1936 to 2276. Kappa free light chains 12.96; lambda FLC 3.45, ratio 3.76.  --2/23/22: Bone marrow biopsy showed mildly hypercellular bone marrow with 7% plasma cells; FISH now with gain of 1q (24%); partial loss of CDKN2C signal (8%), 3 signals each for chromosomes 5, 9, and 15 (81%), and monosmony 13 (90%); no loss of TP53 or rearrangement of IGH.  --2/25/22: Bone survey showed stable hyperlucency in the calvarium; no lytic lesions.    INTERIM HISTORY:  Alfredo reports losing weight as he has less desired his dysphagia diet. He is not eating much of his meals as he has not liked the mushy texture.  He is temporarily off his Eliquis and Plavix in preparation for a neck procedure.  He is planning to start medical marijuana soon.      REVIEW OF SYSTEMS:   14 point ROS was reviewed and is negative other than as noted above in HPI.       HOME MEDICATIONS:  Current Outpatient Medications   Medication Sig Dispense Refill      acetaminophen (TYLENOL) 325 MG tablet Take 325-650 mg by mouth every 6 hours as needed for mild pain        atorvastatin (LIPITOR) 40 MG tablet Take 1 tablet (40 mg) by mouth daily 90 tablet 3     calcium carbonate 600 mg-vitamin D 400 units (CALTRATE) 600-400 MG-UNIT per tablet Take 1 tablet by mouth 2 times daily        Calcium Carbonate-Vit D-Min (CALCIUM 600+D PLUS MINERALS) 600-400 MG-UNIT TABS Take 1 tablet by mouth       diclofenac (VOLTAREN) 1 % topical gel Apply 4 g topically 4 times daily as needed for moderate pain 150 g 11     digoxin (LANOXIN) 125 MCG tablet Take 1 tablet (125 mcg) by mouth daily 90 tablet 3     gabapentin (NEURONTIN) 300 MG capsule Take 2 capsules (600 mg) by mouth At Bedtime 180 capsule 3     meclizine (ANTIVERT) 25 MG tablet Take 1 tablet (25 mg) by mouth 2 times daily       melatonin 1 MG TABS tablet Take 1 mg by mouth At Bedtime ALSO TAKING 1 MG PO HS PRN FOR INSOMNIA       mirtazapine (REMERON) 15 MG tablet Take 1 tablet (15 mg) by mouth At Bedtime 90 tablet 3     multivitamin (OCUVITE) TABS Take 1 tablet by mouth daily        senna-docusate (SENOKOT-S/PERICOLACE) 8.6-50 MG tablet Take 2 tablets by mouth 2 times daily as needed for constipation       apixaban ANTICOAGULANT (ELIQUIS) 5 MG tablet Take 1 tablet (5 mg) by mouth 2 times daily (Patient not taking: Reported on 6/13/2022) 180 tablet 3     clopidogrel (PLAVIX) 75 MG tablet Take 1 tablet (75 mg) by mouth daily (Patient not taking: Reported on 6/13/2022) 90 tablet 1         ALLERGIES:  Allergies   Allergen Reactions     Sulfa Drugs Difficulty breathing, Swelling and Hives     Adhesive Tape Blisters     Amiodarone Other (See Comments)     Developed pleural effusion     Cephalosporins      Penicillins Other (See Comments)     Reaction occurred as a child  Other reaction(s): Hives         PAST MEDICAL HISTORY:  Past Medical History:   Diagnosis Date     Atrial fibrillation (H)     amiodarone therapy discontinued due to  pulmonary toxicity     Atrial flutter (H)      Benign essential hypertension 11/20/2018     Cancer (H) vocal cord     Carpal tunnel syndrome     abstracted 7/3/02.     Coronary artery disease involving native coronary artery of native heart without angina pectoris 05/08/2020    Cath 5/28/2020: patent stents; Cath 5/18/2020: ISABEL x4 to RCA; Cath 3/2020: 1 vessel disease; Cath 2017: moderate 2 vessel disease     CVA (cerebral infarction) 05/05/2015     Demyelinating disease of central nervous system, unspecified (H)     abstracted 7/3/02. 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     Pneumonia 08/17/2016     PVD (peripheral vascular disease) (H)      Redundant colon     needs CT colonography     Shingles      SKIN DISORDERS NEC 03/15/2005     Sleep apnea      Sleep apnea      SVT (supraventricular tachycardia) (H)          PAST SURGICAL HISTORY:  Past Surgical History:   Procedure Laterality Date     BIOPSY  brain 2002     BONE MARROW BIOPSY, BONE SPECIMEN, NEEDLE/TROCAR N/A 6/8/2017    Procedure: BIOPSY BONE MARROW;  UNILATERAL BONE MARROW BIOPSY (CONSCIOUS SEDATION) ;  Surgeon: Jamie Gonzales MD;  Location:  GI     BONE MARROW BIOPSY, BONE SPECIMEN, NEEDLE/TROCAR N/A 2/23/2022    Procedure: BIOPSY, BONE MARROW;  Surgeon: Carmelita Aguilera MD;  Location:   GI     CARDIAC CATHERIZATION  09/05/2017    2V CAD, IFR of RCA 0.95     CV CORONARY ANGIOGRAM N/A 3/23/2020    Procedure: Coronary Angiogram and right heart cath;  Surgeon: Gino Ferrer MD;  Location:  HEART CARDIAC CATH LAB     CV HEART CATHETERIZATION WITH POSSIBLE INTERVENTION N/A 5/28/2020    Procedure: Heart Catheterization with Possible Intervention;  Surgeon: Larry Chawla MD;  Location:  HEART CARDIAC CATH LAB     CV HEART CATHETERIZATION WITH POSSIBLE INTERVENTION N/A 5/18/2020    Procedure: Heart Catheterization with Possible Intervention;  Surgeon: Gaurang Swenson MD;  Location:  HEART CARDIAC CATH LAB     CV INSTANTANEOUS WAVE-FREE RATIO N/A 3/23/2020    Procedure: Instantaneous Wave-Free Ratio;  Surgeon: Gino Ferrer MD;  Location:  HEART CARDIAC CATH LAB     CV PCI ATHERECTOMY ORBITAL N/A 5/18/2020    Procedure: Percutaneous Coronary Intervention Atherectomy Rotational;  Surgeon: Gaurang Swenson MD;  Location:  HEART CARDIAC CATH LAB     CV PCI STENT DRUG ELUTING N/A 5/18/2020    Procedure: Percutaneous Coronary Intervention Stent Drug Eluting;  Surgeon: Gaurang Swenson MD;  Location:  HEART CARDIAC CATH LAB     CV RIGHT HEART CATH MEASUREMENTS RECORDED N/A 5/28/2020    Procedure: Right Heart Cath;  Surgeon: Larry Chawla MD;  Location:  HEART CARDIAC CATH LAB     CV TEMPORARY PACEMAKER INSERTION N/A 5/18/2020    Procedure: Temporary Pacemaker Insertion;  Surgeon: Gaurang Swenson MD;  Location:  HEART CARDIAC CATH LAB     ESOPHAGOSCOPY, GASTROSCOPY, DUODENOSCOPY (EGD), COMBINED N/A 9/8/2018    Procedure: COMBINED ESOPHAGOSCOPY, GASTROSCOPY, DUODENOSCOPY (EGD), BIOPSY SINGLE OR MULTIPLE;;  Surgeon: Xander Marie MD;  Location:  GI     HEAD & NECK SURGERY       ORTHOPEDIC SURGERY      right arm ulna reset after injury     THORACOSCOPIC WEDGE RESECTION LUNG Right 8/2/2016    Procedure: THORACOSCOPIC WEDGE RESECTION  LUNG;  Surgeon: Abdelrahman Noriega MD;  Location: [facility] OR     ZZC NONSPECIFIC PROCEDURE  as a child    T & A. abstracted 7/3/02.     ZZC NONSPECIFIC PROCEDURE  early    CTR. abstracted 7/3/02.         SOCIAL HISTORY:  Social History     Socioeconomic History     Marital status:      Spouse name: Not on file     Number of children: Not on file     Years of education: Not on file     Highest education level: Not on file   Occupational History     Not on file   Tobacco Use     Smoking status: Former Smoker     Packs/day: 1.00     Years: 30.00     Pack years: 30.00     Types: Cigarettes     Quit date: 2013     Years since quittin.8     Smokeless tobacco: Never Used   Substance and Sexual Activity     Alcohol use: Not Currently     Alcohol/week: 42.0 standard drinks     Types: 42 Standard drinks or equivalent per week     Drug use: No     Sexual activity: Not Currently   Other Topics Concern     Parent/sibling w/ CABG, MI or angioplasty before 65F 55M? Not Asked      Service Not Asked     Blood Transfusions Not Asked     Caffeine Concern Yes     Comment: 1 Coke occasionally      Occupational Exposure Not Asked     Hobby Hazards Not Asked     Sleep Concern Not Asked     Stress Concern Not Asked     Weight Concern Not Asked     Special Diet No     Back Care Not Asked     Exercise No     Bike Helmet Not Asked     Seat Belt Not Asked     Self-Exams Not Asked   Social History Narrative    Retired (disability)      Social Determinants of Health     Financial Resource Strain: Not on file   Food Insecurity: Not on file   Transportation Needs: Not on file   Physical Activity: Not on file   Stress: Not on file   Social Connections: Not on file   Intimate Partner Violence: Not on file   Housing Stability: Not on file         FAMILY HISTORY:  Family History   Problem Relation Age of Onset     Blood Disease Mother         Anemia     Cardiovascular Father      Cancer - colorectal Maternal  "Grandfather      Hypertension Brother      Diabetes Sister 5        Constance Diabetes passed at 36     Respiratory Other         Lung Cancer         PHYSICAL EXAM:  Vital signs:  BP (!) 81/60   Pulse 85   Resp 16   Ht 1.727 m (5' 8\")   Wt 54.4 kg (120 lb)   SpO2 98%   BMI 18.25 kg/m     GENERAL/CONSTITUTIONAL: No acute distress but frail and very thin, sitting in wheelchair.  EYES: No scleral icterus.  ENT/MOUTH: Neck supple. Mask in place.  LYMPH: No submandibular, submental, anterior cervical, posterior cervical, supraclavicular, axillary adenopathy.   RESPIRATORY: Clear to auscultation bilaterally. No crackles or wheezing.   CARDIOVASCULAR: Irregular rate and rhythm 3/6 systolic murmur loudest at the aortic position.  GASTROINTESTINAL: No hepatosplenomegaly, masses, or tenderness. No guarding or distention.  MUSCULOSKELETAL:  No cyanosis, clubbing, or edema.  NEUROLOGIC:  Alert, oriented, answers questions appropriately.  INTEGUMENTARY: No rashes or jaundice.  GAIT: Did not assess as patient wheelchair-bound.    LABS:  CBC RESULTS: Recent Labs   Lab Test 06/13/22  1439   WBC 14.1*   RBC 3.95*   HGB 12.2*   HCT 37.9*   MCV 96   MCH 30.9   MCHC 32.2   RDW 14.6        Last Comprehensive Metabolic Panel:  Sodium   Date Value Ref Range Status   06/13/2022 137 133 - 144 mmol/L Final   06/22/2021 140 133 - 144 mmol/L Final     Potassium   Date Value Ref Range Status   06/13/2022 3.4 3.4 - 5.3 mmol/L Final   06/22/2021 4.0 3.4 - 5.3 mmol/L Final     Chloride   Date Value Ref Range Status   06/13/2022 105 94 - 109 mmol/L Final   06/22/2021 109 94 - 109 mmol/L Final     Chloride (External) (External)   Date Value Ref Range Status   02/08/2022 104 98 - 109 mmol/L Final     Carbon Dioxide   Date Value Ref Range Status   06/22/2021 28 20 - 32 mmol/L Final     Carbon Dioxide (CO2)   Date Value Ref Range Status   06/13/2022 28 20 - 32 mmol/L Final     Anion Gap   Date Value Ref Range Status   06/13/2022 4 3 - 14 " mmol/L Final   2021 3 3 - 14 mmol/L Final     Glucose   Date Value Ref Range Status   2022 98 70 - 99 mg/dL Final   2021 85 70 - 99 mg/dL Final     Urea Nitrogen   Date Value Ref Range Status   2022 9 7 - 30 mg/dL Final   2021 13 7 - 30 mg/dL Final     Creatinine   Date Value Ref Range Status   2022 0.91 0.66 - 1.25 mg/dL Final   2021 0.97 0.66 - 1.25 mg/dL Final     GFR Estimate   Date Value Ref Range Status   2022 86 >60 mL/min/1.73m2 Final     Comment:     Effective 2021 eGFRcr in adults is calculated using the  CKD-EPI creatinine equation which includes age and gender (Ab et al., NE, DOI: 10.1056/BDMCwv1600709)   2021 74 >60 mL/min/[1.73_m2] Final     Comment:     Non  GFR Calc  Starting 2018, serum creatinine based estimated GFR (eGFR) will be   calculated using the Chronic Kidney Disease Epidemiology Collaboration   (CKD-EPI) equation.       Calcium   Date Value Ref Range Status   2022 8.6 8.5 - 10.1 mg/dL Final   2021 8.7 8.5 - 10.1 mg/dL Final     Bilirubin Total   Date Value Ref Range Status   2022 0.5 0.2 - 1.3 mg/dL Final   2021 0.6 0.2 - 1.3 mg/dL Final     Alkaline Phosphatase   Date Value Ref Range Status   2022 104 40 - 150 U/L Final   2021 115 40 - 150 U/L Final     ALT   Date Value Ref Range Status   2022 12 0 - 70 U/L Final   2021 52 0 - 70 U/L Final     AST   Date Value Ref Range Status   2022 24 0 - 45 U/L Final   2021 37 0 - 45 U/L Final     Vitamin B12 = 767    SPEP - M-spike:  2017: 0.4  2017: 0.4  3/21/2018: 0.4  2018: 0.5  2018: 0.5  2019: 0.7  2020: 0.8  2021: 0.6  2022: 1.3  3/10/2022: 1.5    3/10/2022:  IgG = 1936 -> 2276 --> 2590    PATHOLOGY:  None new since prior visit.    IMAGIN2022 Chest CT:  1.  Progressed emphysema.   2.  Progressed fibrotic changes with bronchiectasis and  increased interstitial consolidation consistent with underlying usual interstitial pneumonitis, correlate to exclude superimposed infection.  3.  Nonspecific, increasing mediastinal lymphadenopathy, correlate to exclude underlying malignancy or lymphoproliferative cause. No discrete pulmonary mass.  4.  Mildly enlarged heart with coronary artery disease and atherosclerotic vascular disease.    1/14/2022 Brain MRI:  1. No acute intracranial process.  2. Multiple small chronic infarcts.  3. Brain atrophy and chronic-appearing white matter disease, which may  reflect a combination of chronic small vessel ischemic changes and  demyelinating disease.  4. No intracranial hemorrhage, extra-axial fluid collection, mass  lesion or herniation.     1/14/2022 MR cervical spine:  1.  No abnormal signal or abnormal enhancement cervical spinal cord.  2.  Prominent degenerative changes leading to canal compromise or neural foraminal narrowing at multiple levels as described above.    1/14/2022 MR thoracic spine:  1.  Acute to subacute moderate to severe compression fracture T11 vertebral body with no evidence of canal compromise.  2.  Acute on chronic severe compression fracture T12 vertebral body with no evidence of canal compromise.  3.  Stable severe chronic L1 compression fracture with cement material within.  4.  No abnormal signal or abnormal enhancement of thoracic spinal cord.  5.  No significant canal compromise or significant neural foraminal narrowing throughout thoracic spine.    ASSESSMENT/PLAN:  Efrem Ramos is a 76 year old male with the following issues:  1. Monoclonal gammopathy of undetermined significance, IgG kappa  -I reviewed the 6/13/2022 labs with Alfredo.  He has anemia but this may be related to chronic disease and is improved at 12.2.  -I discussed that he has at least 1-2% chance per year of developing multiple myeloma or other lymphoproliferative disorder.  --Will repeat MGUS parameters in 6  months.    2. History of vitamin B12 deficiency  -1/14/2022 Vitamin B12 level was elevated at 1028.  -He discontinued vitamin B12 supplement.  -Will repeat vitamin B12 level in 6 months.    3. Pulmonary fibrosis, history of pulmonary nodule  -Follows with Dr. Abad.  -1/11/2022 Chest CT scan showed progressed emphysema, fibrotic changes, increased interstitial consolidation with underlying usual interstitial pneumonitis.  He also has bronchiectasis, severe CAD. The scan also showed nonspecific increasing mediastinal lymphadenopathy but no discrete pulmonary mass.  This may be potentially reactive given his pneumonia.    4. Moderate aortic stenosis  -Seen by Dr. Cortes previously in 8/2019.  -Intermediate to high risk surgical candidate.  Not felt to need TAVR at present time due to relative asymptomatic status.    5. Lumbar spine pain with history of L1 compression fracture  -His mid back pain is chronic since at least 2018 when he underwent vertebroplasty for his L1 compression fracture but subsequently suffered a fall and reinjuring his back.  [facility].  -He is starting medical marijuana soon to help with back pain.    6. Severe malnutrition related to dysphagia  --He is on dysphagia diet due to risk of aspiration and is losing weight on this diet.  --His albumin is quite low at 2.6 reflecting severe malnutrition.    --His speech therapist had discussed with him previously eating foods to his liking but certain more solid foods have caused choking.  Therefore, from safety standpoint, I advised trying to supplement with protein shakes. Medical marijuana should help with appetite as well.    Return in 6 months    Cherelle Hurley MD  Hematology/Oncology  [facility]    Total time spent: 30 minutes in patient evaluation, counseling, documentation, and coordination of care.

## 2022-05-28 NOTE — LETTER
9/27/2018         RE: Efrem Ramos  [address]  [facility] [address]        Dear Colleague,    Thank you for referring your patient, Efrem Ramos, to the [facility]. Please see a copy of my visit note below.    [facility]  HEMATOLOGY ONCOLOGY    HEMATOLOGY FOLLOWUP NOTE      DIAGNOSES:   1.  Monoclonal gammopathy of unknown significance.   2.  Anemia.   3.  History of Vocal cord cancer.      TREATMENT:     1. Vitamin B12 2000 mcg p.o. daily.      SUBJECTIVE:  Patient comes in for followup today. He was admitted to the hospitak with diarrhea and dehydration. He is slowly mara     REVIEW OF SYSTEMS:  A complete review of systems was performed and found to be negative other than pertinent positives mentioned in History of Present Illness.     Past medical, social histories reviewed.    Meds- Reviewed.     PHYSICAL EXAMINATION:   VITAL SIGNS:/80 (BP Location: Left arm, Patient Position: Chair, Cuff Size: Adult Regular)  Pulse 65  Temp 97.7  F (36.5  C) (Oral)  Resp 16  Wt 81.7 kg (180 lb 3.2 oz)  SpO2 100%  BMI 27.4 kg/m2  CONSTITUTIONAL: Appears tired.  HEENT: Pupils are equal. Oropharynx is clear.   NECK: No cervical or supraclavicular lymphadenopathy.   RESPIRATORY: Clear bilaterally.   CARD/VASC: S1, S2, regular.   GI: Soft, nontender, nondistended, no hepatosplenomegaly.   MUSKULOSKELETAL: Warm, well perfused.   NEUROLOGIC: Alert, awake.   INTEGUMENT: No rash.   LYMPHATICS: No edema.   PSYCH: Mood and affect was normal.     LABORATORY DATA AND IMAGING REVIEWED DURING THIS VISIT:  Recent Labs   Lab Test  09/19/18   1254 09/17/18   09/10/18   0750   09/08/18   1555   NA  142  140   < >  141   < >   --    POTASSIUM  4.6  4.4   < >  4.3   < >   --    CHLORIDE  107  105   < >  114*   < >   --    CO2  31  29   < >  19*   < >   --    ANIONGAP  4   --    --   8   < >   --    BUN  10  <10   < >  7   < >   --    CR  0.88  0.80   < >  0.69   < >   --    GLC   82  79   < >  95   < >   --    ULISSES  8.5  8.9   < >  7.4*   < >   --    MAG   --    --    --   1.8   --   2.8*    < > = values in this interval not displayed.     Recent Labs   Lab Test  09/19/18   1254 09/13/18  09/10/18   0750  09/09/18   0601   09/05/18   1628  03/21/18   1303   WBC  13.7*   --   11.2*  11.6*   < >  19.9*  11.6*   HGB  11.9*  11.8*  11.2*  11.0*   < >  14.4  13.0*   PLT  367   --   294  263   < >  366  293   MCV  99   --   95  96   < >  92  97   NEUTROPHIL  72.3   --    --    --    --   77.1  64.5    < > = values in this interval not displayed.     Recent Labs   Lab Test  09/19/18   1254 09/13/18  09/10/18   0750   09/16/13   1350  08/20/13   1509   BILITOTAL  0.7  1.0  0.9   < >  0.9  0.7   ALKPHOS  173*  279*  424*   < >  90  109   ALT  57  96*  183*   < >  28  28   AST  33  66*  159*   < >  30  28   ALBUMIN  3.0*  2.9*  2.5*   < >  4.1  3.8   LDH   --    --    --    --   453  434    < > = values in this interval not displayed.     Results for ADORE PHANI JULIA (MRN [id number]) as of 9/27/2018 12:52   Ref. Range 3/21/2018 13:03 9/9/2018 06:01 9/19/2018 12:54   Gamma Fraction Latest Ref Range: 0.7 - 1.6 g/dL 1.8 (H) 1.6 1.7 (H)   IGA Latest Ref Range: 70 - 380 mg/dL 419 (H)  435 (H)   IGG Latest Ref Range: 695 - 1620 mg/dL 1660 (H)  1590   IGM Latest Ref Range: 60 - 265 mg/dL 234  255   Immunofixation ELP Unknown (Note)  (Note)   Kappa Free Lt Chain Latest Ref Range: 0.33 - 1.94 mg/dL 3.51 (H)  4.50 (H)   Kappa Lambda Ratio Latest Ref Range: 0.26 - 1.65  1.42  1.69 (H)   Lambda Free Lt Chain Latest Ref Range: 0.57 - 2.63 mg/dL 2.48  2.67 (H)   Mitochondrial M2 Antibody IgG Latest Ref Range: <4 U/mL  1    Monoclonal Peak Latest Ref Range: 0.0 g/dL 0.4 (H) 0.5 (H) 0.4 (H)       ECOG PS: 1    ASSESSMENT:   1- This is a 73-year-old gentleman with monoclonal gammopathy of unknown significance.    Bone marrow biopsy 6/8/17- normo cellular marrow with 0.3% monoclonal plasma cells. Normal flow. One (5%)  "of the 20 metaphase cells analyzed had a hyperdiploid karyotype   with gains of one extra copy each of chromosomes 5, 7, 9, 15, and 19, and loss of one copy each of chromosomes 13 and 22. 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     [facility]- M spike of 0.4 grams ,elevated kappa and lambda light chains and ratio. no end organ damage.   Recent admission due to worsening diarrhea. Leukocytosis is likely reactive.   We will monitor his labs for now. I will order a skeletal survey given back pain.    PLAN:     1. Labs before next visit- CBC diff, CMP, IFXN, SPEP, light chain, B12.   2.  RTC MD 3-4 months  3- Continue oral B12 2000 mcg once a week  4- Skeletal survey in 1-2 weeks  CASSIDY MAYO MD    9/27/2018    CC: Danny Paige MD          Oncology Rooming Note    September 27, 2018 12:57 PM   Eferm Ramos is a 73 year old male who presents for:    Chief Complaint   Patient presents with     Oncology Clinic Visit     Initial Vitals: /80 (BP Location: Left arm, Patient Position: Chair, Cuff Size: Adult Regular)  Pulse 65  Temp 97.7  F (36.5  C) (Oral)  Resp 16  Wt 81.7 kg (180 lb 3.2 oz)  SpO2 100%  BMI 27.4 kg/m2 Estimated body mass index is 27.4 kg/(m^2) as calculated from the following:    Height as of 9/5/18: 1.727 m (5' 8\").    Weight as of this encounter: 81.7 kg (180 lb 3.2 oz). Body surface area is 1.98 meters squared.  Moderate Pain (4) Comment: low back pain   No LMP for male patient.  Allergies reviewed: Yes  Medications reviewed: Yes    Medications: Medication refills not needed today.  Pharmacy name entered into Major Aide:    [id number] - [place], [place] - [address] AT [address] [facility] [place] [address]  [id number] - [place], [place] - [address] AT [address] [place] [address]    Clinical concerns: None     5 minutes for nursing intake (face to face time)     Opal" MARY Samuel                Again, thank you for allowing me to participate in the care of your patient.        Sincerely,        Shae Aldana MD     None

## 2022-06-03 NOTE — TELEPHONE ENCOUNTER
Received message from MA. Upon confirmation of appt 06/06/22, Pt son advised that plavix was not held and will only be a 5 days prior to current appointment.  Called son- advised we will need to cancel appt for 06/06/22 and they can resume medications Eliquis and Plavix at this time especially considering this is a cervical procedure. Advised we will contact them when we hear back about reholding medication to reschedule.      Routing to providers:   to review for approval to rehold medication per guidelines below- currently next available is approx June 17th.    Patient is requesting to schedule an injection with the [facility].      This would require the patient to hold:       Family Prac     Apixaban (Eliquis)          Hold for 3 days before, restart after 24 hours   ------------------------------------------------------------  [facility]-     Clopidogrel (Plavix)          Hold 7 days prior to procedure, restart 12 hours after procedure        We are requesting your approval to hold the medication for this time frame.     Please keep call open and route back to the PAIN NURSE [[id number]] pool, Pt will be scheduled by Pain clinic after hold approved.     PROVIDER REMINDER:  For Coumadin, please also route to Pt's INR clinic      If hold approved, we will contact the patient for scheduling.     Thank you.

## 2022-06-06 NOTE — TELEPHONE ENCOUNTER
Reviewed chart -     Stents placed >1 year ago - OK to hold Plavix x 7days as directed below  CHADSVASc 5 (CVA 2014, CAD, age). OK to hold Eliquis x 3 days as directed below as risk of C-spine procedure on AC >> risk of recurrent CVA.    Marley farley

## 2022-06-07 NOTE — TELEPHONE ENCOUNTER
Called pt son-rescheduled appt to 06/20/22. Discuss details for holding Plavix x7 and Eliquis x3.   Advised on requirements for holding. Discussed need for . If becomes sick/ill/infection or started on ABX or steroids that we will need to be contacted for rescheduling.  States no further questions.      Lisa AMINN, RN Care Coordinator  [facility]  Pain [facility]

## 2022-06-13 NOTE — PROGRESS NOTES
Medical Assistant Note:  Efrem Ramos presents today for blood draw.    Patient seen by provider today: Yes: lionel.   present during visit today: Not Applicable.    Concerns: No Concerns.    Procedure:  Lab draw site: lac, Needle type: bf, Gauge: 23.    Post Assessment:  Labs drawn without difficulty: Yes.    Discharge Plan:  Departure Mode: Ambulatory.    Face to Face Time: 5 min.    Ebony Johnson, CMA

## 2022-06-13 NOTE — PROGRESS NOTES
"Oncology Rooming Note    June 13, 2022 3:18 PM   Efrem Ramos is a 76 year old male who presents for:    Chief Complaint   Patient presents with     Oncology Clinic Visit     Initial Vitals: There were no vitals taken for this visit. Estimated body mass index is 20.37 kg/m  as calculated from the following:    Height as of 4/19/22: 1.727 m (5' 8\").    Weight as of 4/19/22: 60.8 kg (134 lb). There is no height or weight on file to calculate BSA.  Data Unavailable Comment: Data Unavailable   No LMP for male patient.  Allergies reviewed: Yes  Medications reviewed: Yes    Medications: Medication refills not needed today.  Pharmacy name entered into [facility]:    [id number] - [place], [place] - [address] AT [facility] [id number] - [place], [place] - [address] AT [facility] [address]    Clinical concerns:  doctor was notified.      Claudia Hill MA            "

## 2022-06-13 NOTE — LETTER
6/13/2022         RE: Efrem Ramos  [address]  [facility] [address]        Dear Colleague,    Thank you for referring your patient, Efrem Ramos, to the [facility] [place]. Please see a copy of my visit note below.    [facility]    Hematology/Oncology Established Patient Follow-up Note      Today's Date: 6/13/2022    Reason for Follow-up:  MGUS, IgG kappa type.     HISTORY OF PRESENT ILLNESS: Efrem Ramos is a 76 year old male who presents for follow-up of MGUS and vitamin B12 deficiency anemia.      --He was previously followed by Dr. Alan Solomon for MGUS in 2013 when his M-spike was 0.6 g/dL.  He then transferred his care to Dr. Shae Aldana around 2015.    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  For his vitamin B12 deficiency anemia, he received oral B12 2000 mcg once per week.  --1/10/2022: Hospitalized at [facility] for pneumonia. M-spike increased from 0.6 g/dL (2/16/2021) to 1.3 g/dL (1/12/2022). IgG increased from 1936 to 2276. Kappa free light chains 12.96; lambda FLC 3.45, ratio 3.76.  --2/23/22: Bone marrow biopsy showed mildly hypercellular bone marrow with 7% plasma cells; FISH now with gain of 1q (24%); partial loss of CDKN2C signal (8%), 3 signals each for chromosomes 5, 9, and 15 (81%), and monosmony 13 (90%); no loss of TP53 or rearrangement of IGH.  --2/25/22: Bone survey showed stable hyperlucency in the calvarium; no lytic lesions.    INTERIM HISTORY:  Alfredo reports losing weight as he has less desired his dysphagia diet. He is not eating much of his meals as he has not liked the mushy texture.  He is temporarily off his Eliquis and Plavix in preparation for a neck procedure.   He is planning to start medical marijuana soon.      REVIEW OF SYSTEMS:   14 point ROS was reviewed and is negative other than as noted above in HPI.       HOME MEDICATIONS:  Current Outpatient Medications   Medication Sig Dispense Refill     acetaminophen (TYLENOL) 325 MG tablet Take 325-650 mg by mouth every 6 hours as needed for mild pain        atorvastatin (LIPITOR) 40 MG tablet Take 1 tablet (40 mg) by mouth daily 90 tablet 3     calcium carbonate 600 mg-vitamin D 400 units (CALTRATE) 600-400 MG-UNIT per tablet Take 1 tablet by mouth 2 times daily        Calcium Carbonate-Vit D-Min (CALCIUM 600+D PLUS MINERALS) 600-400 MG-UNIT TABS Take 1 tablet by mouth       diclofenac (VOLTAREN) 1 % topical gel Apply 4 g topically 4 times daily as needed for moderate pain 150 g 11     digoxin (LANOXIN) 125 MCG tablet Take 1 tablet (125 mcg) by mouth daily 90 tablet 3     gabapentin (NEURONTIN) 300 MG capsule Take 2 capsules (600 mg) by mouth At Bedtime 180 capsule 3     meclizine (ANTIVERT) 25 MG tablet Take 1 tablet (25 mg) by mouth 2 times daily       melatonin 1 MG TABS tablet Take 1 mg by mouth At Bedtime ALSO TAKING 1 MG PO HS PRN FOR INSOMNIA       mirtazapine (REMERON) 15 MG tablet Take 1 tablet (15 mg) by mouth At Bedtime 90 tablet 3     multivitamin (OCUVITE) TABS Take 1 tablet by mouth daily        senna-docusate (SENOKOT-S/PERICOLACE) 8.6-50 MG tablet Take 2 tablets by mouth 2 times daily as needed for constipation       apixaban ANTICOAGULANT (ELIQUIS) 5 MG tablet Take 1 tablet (5 mg) by mouth 2 times daily (Patient not taking: Reported on 6/13/2022) 180 tablet 3     clopidogrel (PLAVIX) 75 MG tablet Take 1 tablet (75 mg) by mouth daily (Patient not taking: Reported on 6/13/2022) 90 tablet 1         ALLERGIES:  Allergies   Allergen Reactions     Sulfa Drugs Difficulty breathing, Swelling and Hives     Adhesive Tape Blisters     Amiodarone Other (See Comments)     Developed pleural effusion     Cephalosporins       Penicillins Other (See Comments)     Reaction occurred as a child  Other reaction(s): Hives         PAST MEDICAL HISTORY:  Past Medical History:   Diagnosis Date     Atrial fibrillation (H)     amiodarone therapy discontinued due to pulmonary toxicity     Atrial flutter (H)      Benign essential hypertension 11/20/2018     Cancer (H) vocal cord     Carpal tunnel syndrome     abstracted 7/3/02.     Coronary artery disease involving native coronary artery of native heart without angina pectoris 05/08/2020    Cath 5/28/2020: patent stents; Cath 5/18/2020: ISABEL x4 to RCA; Cath 3/2020: 1 vessel disease; Cath 2017: moderate 2 vessel disease     CVA (cerebral infarction) 05/05/2015     Demyelinating disease of central nervous system, unspecified (H)     abstracted 7/3/02. 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     Pneumonia 08/17/2016     PVD (peripheral vascular disease) (H)      Redundant colon     needs CT colonography     Shingles      SKIN DISORDERS NEC 03/15/2005     Sleep apnea      Sleep apnea      SVT (supraventricular tachycardia) (H)          PAST SURGICAL HISTORY:  Past Surgical History:   Procedure Laterality Date     BIOPSY  brain 2002     BONE MARROW BIOPSY, BONE SPECIMEN, NEEDLE/TROCAR N/A 6/8/2017    Procedure: BIOPSY BONE MARROW;  UNILATERAL BONE MARROW  BIOPSY (CONSCIOUS SEDATION) ;  Surgeon: Jamie Gonzales MD;  Location:  GI     BONE MARROW BIOPSY, BONE SPECIMEN, NEEDLE/TROCAR N/A 2/23/2022    Procedure: BIOPSY, BONE MARROW;  Surgeon: Carmelita Aguilera MD;  Location:  GI     CARDIAC CATHERIZATION  09/05/2017    2V CAD, IFR of RCA 0.95     CV CORONARY ANGIOGRAM N/A 3/23/2020    Procedure: Coronary Angiogram and right heart cath;  Surgeon: Gino Ferrer MD;  Location:  HEART CARDIAC CATH LAB     CV HEART CATHETERIZATION WITH POSSIBLE INTERVENTION N/A 5/28/2020    Procedure: Heart Catheterization with Possible Intervention;  Surgeon: Larry Chawla MD;  Location:  HEART CARDIAC CATH LAB     CV HEART CATHETERIZATION WITH POSSIBLE INTERVENTION N/A 5/18/2020    Procedure: Heart Catheterization with Possible Intervention;  Surgeon: Gaurang Swenson MD;  Location:  HEART CARDIAC CATH LAB     CV INSTANTANEOUS WAVE-FREE RATIO N/A 3/23/2020    Procedure: Instantaneous Wave-Free Ratio;  Surgeon: Gino Ferrer MD;  Location:  HEART CARDIAC CATH LAB     CV PCI ATHERECTOMY ORBITAL N/A 5/18/2020    Procedure: Percutaneous Coronary Intervention Atherectomy Rotational;  Surgeon: Gaurang Swenson MD;  Location:  HEART CARDIAC CATH LAB     CV PCI STENT DRUG ELUTING N/A 5/18/2020    Procedure: Percutaneous Coronary Intervention Stent Drug Eluting;  Surgeon: Gaurang Swenson MD;  Location:  HEART CARDIAC CATH LAB     CV RIGHT HEART CATH MEASUREMENTS RECORDED N/A 5/28/2020    Procedure: Right Heart Cath;  Surgeon: Larry Chawla MD;  Location:  HEART CARDIAC CATH LAB     CV TEMPORARY PACEMAKER INSERTION N/A 5/18/2020    Procedure: Temporary Pacemaker Insertion;  Surgeon: Gaurang Swenson MD;  Location:  HEART CARDIAC CATH LAB     ESOPHAGOSCOPY, GASTROSCOPY, DUODENOSCOPY (EGD), COMBINED N/A 9/8/2018    Procedure: COMBINED ESOPHAGOSCOPY, GASTROSCOPY, DUODENOSCOPY (EGD), BIOPSY SINGLE OR MULTIPLE;;   Surgeon: Xander Marie MD;  Location:  GI     HEAD & NECK SURGERY       ORTHOPEDIC SURGERY      right arm ulna reset after injury     THORACOSCOPIC WEDGE RESECTION LUNG Right 2016    Procedure: THORACOSCOPIC WEDGE RESECTION LUNG;  Surgeon: Abdelrahman Noriega MD;  Location:  OR     C NONSPECIFIC PROCEDURE  as a child    T & A. abstracted 7/3/02.     ZZC NONSPECIFIC PROCEDURE  early    CTR. abstracted 7/3/02.         SOCIAL HISTORY:  Social History     Socioeconomic History     Marital status:      Spouse name: Not on file     Number of children: Not on file     Years of education: Not on file     Highest education level: Not on file   Occupational History     Not on file   Tobacco Use     Smoking status: Former Smoker     Packs/day: 1.00     Years: 30.00     Pack years: 30.00     Types: Cigarettes     Quit date: 2013     Years since quittin.8     Smokeless tobacco: Never Used   Substance and Sexual Activity     Alcohol use: Not Currently     Alcohol/week: 42.0 standard drinks     Types: 42 Standard drinks or equivalent per week     Drug use: No     Sexual activity: Not Currently   Other Topics Concern     Parent/sibling w/ CABG, MI or angioplasty before 65F 55M? Not Asked      Service Not Asked     Blood Transfusions Not Asked     Caffeine Concern Yes     Comment: 1 Coke occasionally      Occupational Exposure Not Asked     Hobby Hazards Not Asked     Sleep Concern Not Asked     Stress Concern Not Asked     Weight Concern Not Asked     Special Diet No     Back Care Not Asked     Exercise No     Bike Helmet Not Asked     Seat Belt Not Asked     Self-Exams Not Asked   Social History Narrative    Retired (disability)      Social Determinants of Health     Financial Resource Strain: Not on file   Food Insecurity: Not on file   Transportation Needs: Not on file   Physical Activity: Not on file   Stress: Not on file   Social Connections: Not on file  "  Intimate Partner Violence: Not on file   Housing Stability: Not on file         FAMILY HISTORY:  Family History   Problem Relation Age of Onset     Blood Disease Mother         Anemia     Cardiovascular Father      Cancer - colorectal Maternal Grandfather      Hypertension Brother      Diabetes Sister 5        Juvinile Diabetes passed at 36     Respiratory Other         Lung Cancer         PHYSICAL EXAM:  Vital signs:  BP (!) 81/60   Pulse 85   Resp 16   Ht 1.727 m (5' 8\")   Wt 54.4 kg (120 lb)   SpO2 98%   BMI 18.25 kg/m     GENERAL/CONSTITUTIONAL: No acute distress but frail and very thin, sitting in wheelchair.  EYES: No scleral icterus.  ENT/MOUTH: Neck supple. Mask in place.  LYMPH: No submandibular, submental, anterior cervical, posterior cervical, supraclavicular, axillary adenopathy.   RESPIRATORY: Clear to auscultation bilaterally. No crackles or wheezing.   CARDIOVASCULAR: Irregular rate and rhythm 3/6 systolic murmur loudest at the aortic position.  GASTROINTESTINAL: No hepatosplenomegaly, masses, or tenderness. No guarding or distention.  MUSCULOSKELETAL:  No cyanosis, clubbing, or edema.  NEUROLOGIC:  Alert, oriented, answers questions appropriately.  INTEGUMENTARY: No rashes or jaundice.  GAIT: Did not assess as patient wheelchair-bound.    LABS:  CBC RESULTS: Recent Labs   Lab Test 06/13/22  1439   WBC 14.1*   RBC 3.95*   HGB 12.2*   HCT 37.9*   MCV 96   MCH 30.9   MCHC 32.2   RDW 14.6        Last Comprehensive Metabolic Panel:  Sodium   Date Value Ref Range Status   06/13/2022 137 133 - 144 mmol/L Final   06/22/2021 140 133 - 144 mmol/L Final     Potassium   Date Value Ref Range Status   06/13/2022 3.4 3.4 - 5.3 mmol/L Final   06/22/2021 4.0 3.4 - 5.3 mmol/L Final     Chloride   Date Value Ref Range Status   06/13/2022 105 94 - 109 mmol/L Final   06/22/2021 109 94 - 109 mmol/L Final     Chloride (External) (External)   Date Value Ref Range Status   02/08/2022 104 98 - 109 mmol/L Final "     Carbon Dioxide   Date Value Ref Range Status   06/22/2021 28 20 - 32 mmol/L Final     Carbon Dioxide (CO2)   Date Value Ref Range Status   06/13/2022 28 20 - 32 mmol/L Final     Anion Gap   Date Value Ref Range Status   06/13/2022 4 3 - 14 mmol/L Final   06/22/2021 3 3 - 14 mmol/L Final     Glucose   Date Value Ref Range Status   06/13/2022 98 70 - 99 mg/dL Final   06/22/2021 85 70 - 99 mg/dL Final     Urea Nitrogen   Date Value Ref Range Status   06/13/2022 9 7 - 30 mg/dL Final   06/22/2021 13 7 - 30 mg/dL Final     Creatinine   Date Value Ref Range Status   06/13/2022 0.91 0.66 - 1.25 mg/dL Final   06/22/2021 0.97 0.66 - 1.25 mg/dL Final     GFR Estimate   Date Value Ref Range Status   06/13/2022 86 >60 mL/min/1.73m2 Final     Comment:     Effective December 21, 2021 eGFRcr in adults is calculated using the 2021 CKD-EPI creatinine equation which includes age and gender (Ab et al., NEJM, DOI: 10.1056/KFUAcr6989306)   06/22/2021 74 >60 mL/min/[1.73_m2] Final     Comment:     Non  GFR Calc  Starting 12/18/2018, serum creatinine based estimated GFR (eGFR) will be   calculated using the Chronic Kidney Disease Epidemiology Collaboration   (CKD-EPI) equation.       Calcium   Date Value Ref Range Status   06/13/2022 8.6 8.5 - 10.1 mg/dL Final   06/22/2021 8.7 8.5 - 10.1 mg/dL Final     Bilirubin Total   Date Value Ref Range Status   06/13/2022 0.5 0.2 - 1.3 mg/dL Final   02/16/2021 0.6 0.2 - 1.3 mg/dL Final     Alkaline Phosphatase   Date Value Ref Range Status   06/13/2022 104 40 - 150 U/L Final   02/16/2021 115 40 - 150 U/L Final     ALT   Date Value Ref Range Status   06/13/2022 12 0 - 70 U/L Final   02/16/2021 52 0 - 70 U/L Final     AST   Date Value Ref Range Status   06/13/2022 24 0 - 45 U/L Final   02/16/2021 37 0 - 45 U/L Final     Vitamin B12 = 767    SPEP - M-spike:  5/23/2017: 0.4  9/19/2017: 0.4  3/21/2018: 0.4  9/9/2018: 0.5  12/31/2018: 0.5  7/2/2019: 0.7  1/9/2020: 0.8  2/16/2021:  0.6  2022: 1.3  3/10/2022: 1.5    3/10/2022:  IgG = 1936 -> 2276 --> 2590    PATHOLOGY:  None new since prior visit.    IMAGIN2022 Chest CT:  1.  Progressed emphysema.   2.  Progressed fibrotic changes with bronchiectasis and increased interstitial consolidation consistent with underlying usual interstitial pneumonitis, correlate to exclude superimposed infection.  3.  Nonspecific, increasing mediastinal lymphadenopathy, correlate to exclude underlying malignancy or lymphoproliferative cause. No discrete pulmonary mass.  4.  Mildly enlarged heart with coronary artery disease and atherosclerotic vascular disease.    2022 Brain MRI:  1. No acute intracranial process.  2. Multiple small chronic infarcts.  3. Brain atrophy and chronic-appearing white matter disease, which may  reflect a combination of chronic small vessel ischemic changes and  demyelinating disease.  4. No intracranial hemorrhage, extra-axial fluid collection, mass  lesion or herniation.     2022 MR cervical spine:  1.  No abnormal signal or abnormal enhancement cervical spinal cord.  2.  Prominent degenerative changes leading to canal compromise or neural foraminal narrowing at multiple levels as described above.    2022 MR thoracic spine:  1.  Acute to subacute moderate to severe compression fracture T11 vertebral body with no evidence of canal compromise.  2.  Acute on chronic severe compression fracture T12 vertebral body with no evidence of canal compromise.  3.  Stable severe chronic L1 compression fracture with cement material within.  4.  No abnormal signal or abnormal enhancement of thoracic spinal cord.  5.  No significant canal compromise or significant neural foraminal narrowing throughout thoracic spine.    ASSESSMENT/PLAN:  Efrem Ramos is a 76 year old male with the following issues:  1. Monoclonal gammopathy of undetermined significance, IgG kappa  -I reviewed the 2022 labs with Alfredo.  He has anemia  but this may be related to chronic disease and is improved at 12.2.  -I discussed that he has at least 1-2% chance per year of developing multiple myeloma or other lymphoproliferative disorder.  --Will repeat MGUS parameters in 6 months.    2. History of vitamin B12 deficiency  -1/14/2022 Vitamin B12 level was elevated at 1028.  -He discontinued vitamin B12 supplement.  -Will repeat vitamin B12 level in 6 months.    3. Pulmonary fibrosis, history of pulmonary nodule  -Follows with Dr. Abad.  -1/11/2022 Chest CT scan showed progressed emphysema, fibrotic changes, increased interstitial consolidation with underlying usual interstitial pneumonitis.  He also has bronchiectasis, severe CAD. The scan also showed nonspecific increasing mediastinal lymphadenopathy but no discrete pulmonary mass.  This may be potentially reactive given his pneumonia.    4. Moderate aortic stenosis  -Seen by Dr. Cortes previously in 8/2019.  -Intermediate to high risk surgical candidate.  Not felt to need TAVR at present time due to relative asymptomatic status.    5. Lumbar spine pain with history of L1 compression fracture  -His mid back pain is chronic since at least 2018 when he underwent vertebroplasty for his L1 compression fracture but subsequently suffered a fall and reinjuring his back.  [facility].  -He is starting medical marijuana soon to help with back pain.    6. Severe malnutrition related to dysphagia  --He is on dysphagia diet due to risk of aspiration and is losing weight on this diet.  --His albumin is quite low at 2.6 reflecting severe malnutrition.    --His speech therapist had discussed with him previously eating foods to his liking but certain more solid foods have caused choking.  Therefore, from safety standpoint, I advised trying to supplement with protein shakes. Medical marijuana should help with appetite as well.    Return in 6 months    Cherelle Hurley MD  Hematology/Oncology  [facility]    Total time spent: 30 minutes in patient evaluation, counseling, documentation, and coordination of care.    Oncology Rooming Note    June 13, 2022 3:18 PM   Efrem Ramos is a 76 year old male who presents for:    Chief Complaint   Patient presents with     Oncology Clinic Visit     Initial Vitals: There were no vitals taken for this visit. Estimated body mass index is 20.37 kg/m  as calculated from the following:    Height as of 4/19/22: 1.727 m (5' 8\").    Weight as of 4/19/22: 60.8 kg (134 lb). There is no height or weight on file to calculate BSA.  Data Unavailable Comment: Data Unavailable   No LMP for male patient.  Allergies reviewed: Yes  Medications reviewed: Yes    Medications: Medication refills not needed today.  Pharmacy name entered into [facility]:    [id number] - [place], [place] - [address] AT [facility] & [address] [place], [place] [id number] - [place], [place] - [address] AT [facility]    Clinical concerns:  doctor was notified.      Claudia Hill MA                Again, thank you for allowing me to participate in the care of your patient.        Sincerely,        Cherelle Hurley MD    "

## 2022-06-20 NOTE — TELEPHONE ENCOUNTER
This was addressed in separate encounter    Closing    Nava GREEN RN Care Coordinator  [facility]

## 2022-06-20 NOTE — TELEPHONE ENCOUNTER
Pt son called today to get his appointment canceled and r/s. Please call to advise.      Effie Staley    [facility] [place]

## 2022-06-20 NOTE — TELEPHONE ENCOUNTER
"Received message from  that pt's son had canceled procedure same day for 6/20/22. Pt's son advised that Plavix and Eliquis was not held ahead of this procedure and his father took both  \"sometime over the weekend\"  .  Advised they can resume medications Eliquis and Plavix at this time. Advised we will contact them when we hear back about reholding medication to reschedule.        Routing to providers to review for approval to rehold medication per guidelines below:     Patient is requesting to schedule an injection with the [facility].      This would require the patient to hold:       Family Prac     Apixaban (Eliquis)          Hold for 3 days before, restart after 24 hours   ------------------------------------------------------------  [facility]-     Clopidogrel (Plavix)          Hold 7 days prior to procedure, restart 12 hours after procedure        We are requesting your approval to hold the medication for this time frame.     Please keep call open and route back to the PAIN NURSE [[id number]] pool, Pt will be scheduled by Pain clinic after hold approved.        If hold approved, we will contact the patient for scheduling.     Nava GREEN RN Care Coordinator  [facility]       "

## 2022-06-24 NOTE — TELEPHONE ENCOUNTER
Two falls. 6/13 and 6/18.   6/13 bumped right elbow and has a small scab. No other injuries.  6/13 was a very busy day. Patient states over tired and slid to the floor.  6/18 Lost balance when putting on his pants. Next to bed and leaned into it and slid down.     No complaints of any injury. Continuing with home PT, OT and skilled nursing. Home Care will continue to monitor. No orthostatic blood pressures have been done.   Patient is weak. Struggles to get enough nutrition. Son has moved in with him and is helping. [employer] delivered meals.     Next home care is 6/28 with PT.     Juliana Sharpe RN

## 2022-06-28 NOTE — TELEPHONE ENCOUNTER
Home care called regarding: requesting to move one physical therapy visit from this week to next week (delayed visit)     Reason: family medical appointments/moving     Verbal okay given, but advised will route to PCP and if any concerns with delayed PT visit will call them back     (PCP are you okay with this?)     Bella BURNS, Triage RN  [facility]

## 2022-07-06 NOTE — TELEPHONE ENCOUNTER
The Home Care/Assisted Living/Nursing Facility is calling regarding an established patient.  Has the patient seen Home Care in the past or is currently residing in Assisted Living or Nursing Facility? No.     Alexandrea calling from [facility]  requesting the following orders that are NOT within the Home Care, Assisted Living or Nursing Home Eval and Treatment standing order and must be ordered by a Licensed Practitioner.    Preferred Call Back Number: [phone]    PT/OT/Speech Therapy    Routing to Licensed Practitioner (Provider) to review request and provide approval or recommendation.    Writer has verified Requestor will send fax to have orders signed.    Juju Britton RN  -[facility]

## 2022-07-19 NOTE — TELEPHONE ENCOUNTER
Called pt/son. Scheduled for injection.   [facility] with details sent per request.     Hello-     Here are the details of your appointment and the medication holds    You are scheduled for an injection with Dr Heidi Perales on Friday August 5th at 3:45. You will need to hold the Eliquis for 3 days prior to the injection. Begin holding this medication on Tuesday August 2nd. You will need to hold the Plavix for 7 days prior to the injection. You would begin holding this medication on Friday July 29th.      As always, you will need a . If becomes sick/ill/ develop an infection or started on antibiotics or steroids that we will need to be contacted for rescheduling. Please note that we advise that you not have any vaccinations 7 days before or after, covid boosters needing 14 days before or after the injection as steroids will impact the effectiveness of them.     Lisa ALMEIDA, RN Care Coordinator  [facility]  Pain Management

## 2022-07-21 NOTE — ED PROVIDER NOTES
History   Chief Complaint:  Hypotension         The history is provided by the patient.      Efrem Ramos is a 77 year old male who presents with hypotension and COPD on blood thinners.  Patient reportedly did not sleep well last night and then had physical therapy and therapy today.  The occupational therapist to check his blood pressure and noted it to be 79 systolic.  Patient's family member let him sleep because he was so tired and then when he woke up he thought it was systolic in the 40s.  Patient felt a little bit of dizziness.  He states he has been drinking plenty of water.  Denies nausea, vomiting, diarrhea. Patient denies chest pain.  He states that his baseline blood pressures are around 100/60.  Patient denies any fevers, sweats, chills.  Denies any recent falls or head injury.    Review of Systems   Constitutional: Positive for fatigue. Negative for chills, diaphoresis and fever.   Cardiovascular: Negative for chest pain.   Gastrointestinal: Negative for diarrhea, nausea and vomiting.   Neurological: Positive for weakness.   All other systems reviewed and are negative.      Allergies:  Sulfa Drugs  Adhesive Tape  Amiodarone  Cephalosporins  Penicillins    Medications:  Eliquis  Lipitor  Voltaren  Lanoxin  Neurontin  Antivert  Remeron    Past Medical History:     Encephalopathy  Hyperlipidemia  MEL  PVD  Anemia  Vocal cord cancer  Edema  Lymphocytic colitis  Monoclonal gammopathy  Cerebral infarction  Alcohol abuse  Atrial fibrillation  Hypoxia  Acute respiratory failure  Urinary retention  Pneumonia  Hypokalemia  Compression fracture of L1 lumbar cerebra  Hypertension  CAD  Severe aortic stenosis  Cardiogenic shock  Atria flutter  SVT  Non rheumatic mitral valve stenosis  Alcohol dependence  Leukocytosis  COPD    Past Surgical History:    Bone marrow biopsy x2  Brain biopsy  Cardiac cath  CV coronary angiogram  CV heart cath with possible intervention x2  CV instantaneous wave-free ratio  CV PCI  atherectomy orbital  CV PCI stent drug eluting  CV right heart cath measurements recorded  CV temporary pacemaker insertion  EGD  Head and neck surgery  Right arm ulna reset  Thoracoscopic wedge resection lung  T & A  CTR    Family History:    Mother: anemia  Father: cardiovascular  Brother: hypertension  Sister: diabetes    Social History:  The patient presents to the ED with son    Physical Exam     Patient Vitals for the past 24 hrs:   BP Temp Temp src Pulse Resp SpO2 Weight   07/21/22 2130 97/62 -- -- 61 17 99 % --   07/21/22 2120 91/73 -- -- 67 9 100 % --   07/21/22 2100 110/75 -- -- 56 29 99 % --   07/21/22 2020 (!) 118/101 -- -- 65 12 98 % --   07/21/22 2000 115/71 -- -- 66 11 98 % --   07/21/22 1940 115/69 -- -- 71 22 100 % --   07/21/22 1920 123/87 -- -- 80 21 92 % --   07/21/22 1900 109/74 -- -- 65 14 99 % --   07/21/22 1840 97/65 -- -- 66 -- 100 % --   07/21/22 1820 104/70 -- -- 78 (!) 51 93 % --   07/21/22 1810 100/67 -- -- 67 22 99 % --   07/21/22 1800 97/62 -- -- 65 19 100 % --   07/21/22 1720 92/67 -- -- 79 24 98 % --   07/21/22 1710 94/65 -- -- 80 23 97 % --   07/21/22 1649 94/61 97.1  F (36.2  C) Temporal 86 20 97 % 53.1 kg (117 lb)       Physical Exam  Eyes:  The pupils are equal and round    Conjunctivae and sclerae are normal  ENT:    The nose is normal    Pinnae are normal  CV:  Regular rate and rhythm     No edema  Resp:  Lungs are clear    Non-labored    No rales    No wheezing   GI:  Abdomen is soft and non-tender, there is no rigidity    No distension    No rebound tenderness  Rectal: Brown stool   MS:  Normal muscular tone    No asymmetric leg swelling  Skin:  No rash or acute skin lesions noted  Neuro:   Awake, alert.      Speech is normal and fluent.    Face is symmetric.     Moves all extremities      Emergency Department Course   ECG  ECG results from 07/21/22   EKG 12-lead, tracing only     Value    Systolic Blood Pressure     Diastolic Blood Pressure     Ventricular Rate 80    Atrial  Rate 326    SD Interval     QRS Duration 104        QTc 435    P Axis     R AXIS -70    T Axis 35    Interpretation ECG      Atrial fibrillation  Incomplete right bundle branch block  Left anterior fascicular block  Nonspecific ST abnormality  No significant change was found when compared with ECG of 10-BAL-2022 21:51       Imaging:  Chest CT w/o contrast   Final Result   IMPRESSION:    1.  New bilateral groundglass opacities most suggestive of atypical multifocal pneumonia.      2.  Background fibrosis and interstitial lung disease.      3.  Tiny bilateral pleural effusions.         XR Chest 2 Views   Final Result   IMPRESSION: Progressively increased interstitial opacity throughout the right lung, of uncertain chronicity. This may reflect a combination of fibrosis versus acute asymmetric edema or atypical infectious infiltrate.      Chronic blunting of the right costophrenic angle, favoring scar. No left pleural effusion. No pneumothorax.      Upper limits of normal heart size. Atheromatous calcifications of the thoracic aorta. Additional vascular calcifications of the subclavian and axillary arteries.        Report per radiology    Laboratory:  Labs Ordered and Resulted from Time of ED Arrival to Time of ED Departure   COMPREHENSIVE METABOLIC PANEL - Abnormal       Result Value    Sodium 135      Potassium 3.7      Chloride 102      Carbon Dioxide (CO2) 28      Anion Gap 5      Urea Nitrogen 18      Creatinine 0.93      Calcium 8.5      Glucose 100 (*)     Alkaline Phosphatase 83      AST 23      ALT 12      Protein Total 7.7      Albumin 2.4 (*)     Bilirubin Total 0.5      GFR Estimate 84     ROUTINE UA WITH MICROSCOPIC REFLEX TO CULTURE - Abnormal    Color Urine Light Yellow      Appearance Urine Clear      Glucose Urine Negative      Bilirubin Urine Negative      Ketones Urine Negative      Specific Gravity Urine 1.015      Blood Urine Negative      pH Urine 6.0      Protein Albumin Urine Negative       Urobilinogen Urine Normal      Nitrite Urine Negative      Leukocyte Esterase Urine Negative      Mucus Urine Present (*)     RBC Urine <1      WBC Urine <1     CBC WITH PLATELETS AND DIFFERENTIAL - Abnormal    WBC Count 10.0      RBC Count 3.35 (*)     Hemoglobin 10.4 (*)     Hematocrit 32.2 (*)     MCV 96      MCH 31.0      MCHC 32.3      RDW 15.9 (*)     Platelet Count 364      % Neutrophils 71      % Lymphocytes 14      % Monocytes 9      % Eosinophils 4      % Basophils 1      % Immature Granulocytes 1      NRBCs per 100 WBC 0      Absolute Neutrophils 7.1      Absolute Lymphocytes 1.4      Absolute Monocytes 0.9      Absolute Eosinophils 0.4      Absolute Basophils 0.1      Absolute Immature Granulocytes 0.1      Absolute NRBCs 0.0     NT PROBNP INPATIENT - Abnormal    N terminal Pro BNP Inpatient 4,554 (*)    LACTIC ACID WHOLE BLOOD - Normal    Lactic Acid 1.6     TROPONIN I - Normal    Troponin I High Sensitivity 20     PROCALCITONIN - Normal    Procalcitonin <0.05     OCCULT BLOOD STOOL - Normal    Occult Blood Negative     COVID-19 VIRUS (CORONAVIRUS) BY PCR   BLOOD CULTURE   BLOOD CULTURE        Emergency Department Course:         Reviewed:  I reviewed nursing notes, vitals, past medical history and Care Everywhere    Assessments:  1730 I obtained history and examined the patient as noted above.   1913 I rechecked the patient and explained findings.   2109 I rechecked the patient.    Consults:  2117 I spoke with Dr. Bryant, Hospitalist, regarding the patient.    Interventions:  1743 NS 1,000 mL IV  2127 Rocephin 2 g IV    Disposition:  The patient was admitted to the hospital under the care of Dr. Bryant.     Impression & Plan     Medical Decision Making:  Efrem Ramos is a 77 year old male who presents to the emergency department with concerns about low blood pressure.  Low blood pressure readings at home.  He went to therapy today and has been feeling very fatigued.  No significant cough from  baseline.  X-ray reveals some slight increase in his interstitial findings on the right side of his chest.  Lactic acid level and white blood cell count were normal.  Hemoglobin is lower than most recent check.  Occult blood was negative.  He has noted some dark-colored stools but no monica blood.  He is on both aspirin and Eliquis.  BNP was slightly elevated but in line with prior.  He is given IV fluid not have any worsening of his shortness of breath and blood pressure improved here.  CT scan of his chest is obtained to evaluate further for abnormalities on his chest x-ray did show some increased opacities likely atypical pneumonia.  Review of antibiotics indicates that he is previously tolerated Rocephin.    Diagnosis:    ICD-10-CM    1. Pneumonia of right lung due to infectious organism, unspecified part of lung  J18.9    2. Generalized weakness  R53.1        Scribe Disclosure:  I, Mary Anne Crooks, am serving as a scribe at 5:25 PM on 7/21/2022 to document services personally performed by Yosvany Salomon MD based on my observations and the provider's statements to me.     Yosvany Salomon MD  07/21/22 [id number]

## 2022-07-21 NOTE — ED NOTES
Rapid Assessment Note    History:   Efrem Ramos is a 77 year old male who presents with hypotension.  Patient reportedly did not sleep well last night and then had physical therapy and therapy today.  The occupational therapist to check his blood pressure and noted it to be 79 systolic.  Patient's family member let him sleep because he was so tired and then when he woke up he thought it was systolic in the 40s.  Patient felt a little bit of dizziness.  He states he has been drinking plenty of water.  Denies nausea, vomiting, diarrhea patient denies chest pain.  He states that his baseline blood pressures are around 100/60.  Patient denies any fevers, sweats, chills.  Denies any recent falls or head injury.    Exam:   General:  Alert, interactive  Cardiovascular:  Well perfused  Lungs:  No respiratory distress, no accessory muscle use  Neuro:  Moving all 4 extremities  Skin:  Warm, dry  Psych:  Normal affect      Plan of Care:   I evaluated the patient and developed an initial plan of care. I discussed this plan and explained that I, or one of my partners, would be returning to complete the evaluation.     07/21/2022  [employer]    Portions of this medical record were completed by a scribe. UPON MY REVIEW AND AUTHENTICATION BY ELECTRONIC SIGNATURE, this confirms (a) I performed the applicable clinical services, and (b) the record is accurate.        Carrol Griffiths MD  07/21/22 [id number]

## 2022-07-21 NOTE — TELEPHONE ENCOUNTER
Pt's son and OT Sulma were called with providers advice.     Son Alfredo was called. Notes pt has hx of low BP. Pt had not had a good sleep and was up for OT and PT today. Pt was sleeping during call. Not on any distress. Son Alfredo opts to monitor BP when pt wakes up and take him to ER if systolic BP below 90 , pt is weak or dizzy.

## 2022-07-21 NOTE — TELEPHONE ENCOUNTER
[facility] Care OT Sulma, reports today pt had a manual BP reading of 85/55 and 79/56 on pt's BP monitor  Pt reports feeling dizzy. He is not on distress and cognition is normal. No fever, diarrhea or vomiting. Pt's son Alfredo is with pt. Pt reports he has been drinking fluids. He is not on any BP medications. Pulse 62-65. Pt's baseline BP is 100/60 and 90/60.     Please advice if ok to have him seen at ER via EMS or if provider would recommend [facility] referral.     IF [facility] referral is appropriate this would need to be signed and MD would need to call [facility] at [phone].    OT is concerned of safety since pt leives on second level.       Son Alfredo would need a call back with plan at [phone].    OT is also requesting a call back with plan at [phone]    Reason for Disposition    Systolic BP < 90 and feeling dizzy, lightheaded, or weak    Protocols used: LOW BLOOD PRESSURE-A-OH

## 2022-07-22 NOTE — CONSULTS
CLINICAL NUTRITION SERVICES  -  ASSESSMENT NOTE      RECOMMENDATIONS FOR MD/PROVIDER TO ORDER:   Patient would significantly benefit from a feeding tube for nutrition based on ongoing wt loss and general dislike of dysphagia diet on top of poor appetite. This was discussed with patient today   Recommendations Ordered by Registered Dietitian (RD):   Ensure patient orders 2-3 adequately sized meals/day   Chocolate Vital Cuisine BID w/ lunch and dinner    Malnutrition:   % Weight Loss:  > 7.5% in 3 months (severe malnutrition)  % Intake:  </= 50% for >/= 1 month (severe malnutrition)  Subcutaneous Fat Loss:  Orbital region severe depletion, Upper arm region severe depletion and Thoracic region severe depletion  Muscle Loss:  Temporal region severe depletion, Clavicle bone region severe depletion, Scapular bone region severe depletion and Dorsal hand region severe depletion (did not observe LEs today)  Fluid Retention:  None noted    Malnutrition Diagnosis: Severe malnutrition  In Context of:  Chronic illness or disease        REASON FOR ASSESSMENT  Efrem Ramos is a 77 year old male seen by Registered Dietitian for Provider Order - malnutrition       NUTRITION HISTORY  Chart reviewed and discussed with patient ~  Here with community acquired PNA  Has a h/o dysphagia with documented weight loss over several years. He had been recommended to follow a pureed diet with thickened liquids but reported to be eating regular foods at home   Followed by home care with nurses and therapists   No known food allergies     Patient eats only 1 meal/day plus 1-2 Boost/day  This 1 meal consists of either 1 can of soup of 1/4-1/2 of a pre-prepared solid food meal from a meal delivery service   Barriers to PO include missing teeth, worsening fragility, dislike of dysphagia diet and poor appetite  Reports son is main caretaker at home   Acknowledges ongoing wt loss, stating that he recalls weighing closer to 200 lbs about 1 year  "ago    Patient was seen here by the Dietitians in January 2022 and met criteria for severe malnutrition     CURRENT NUTRITION ORDERS  Diet Order:     Pureed diet; mildly thick liquids     Current Intake/Tolerance:  Patient is not hungry but reports enjoying some of the pancake w/ syrup this morning for breakfast   Encouraged him to work on ordering 2-3 meals/day consistently in addition to 2 nutritional supplements/day       NUTRITION FOCUSED PHYSICAL ASSESSMENT FOR DIAGNOSING MALNUTRITION)  Yes          Observed:    Muscle wasting (refer to documentation in Malnutrition section) and Subcutaneous fat loss (refer to documentation in Malnutrition section)    Obtained from Chart/Interdisciplinary Team:  Per H&P - Patient is underweight with visible muscle wasting    ANTHROPOMETRICS  Height: 5' 8\"   Weight: 120 lbs 2.41 oz  Body mass index is 18.27 kg/m .  Weight Status:  Underweight BMI <18.5  IBW: 70 kg   % IBW: 78%   Weight History: Down 12 lbs in 4 months or less (9% body wt)  Wt Readings from Last 20 Encounters:   07/22/22 54.5 kg (120 lb 2.4 oz)   06/13/22 54.4 kg (120 lb)   04/19/22 60.8 kg (134 lb)   04/04/22 60.8 kg (134 lb)   03/10/22 60.8 kg (134 lb)   03/02/22 60 kg (132 lb 3.2 oz)   02/21/22 59.4 kg (131 lb)   02/16/22 61 kg (134 lb 6.4 oz)   02/14/22 60.9 kg (134 lb 3.2 oz)   02/09/22 59.5 kg (131 lb 1.6 oz)   02/06/22 60.2 kg (132 lb 11.2 oz)   01/31/22 60.7 kg (133 lb 14.4 oz)   01/31/22 62 kg (136 lb 9.6 oz)   01/26/22 60.1 kg (132 lb 9.6 oz)   01/23/22 58.7 kg (129 lb 4.8 oz)   01/19/22 62.7 kg (138 lb 3.2 oz)   01/18/22 60 kg (132 lb 4.8 oz)   01/10/22 60.1 kg (132 lb 9.6 oz)   11/16/21 63.5 kg (140 lb)   11/15/21 64.9 kg (143 lb)       LABS  Labs reviewed    MEDICATIONS  Medications reviewed  Remeron   IVF at 100 mL/hr    ASSESSED NUTRITION NEEDS PER APPROVED PRACTICE GUIDELINES:    Dosing Weight 54.5 kg   Estimated Energy Needs: 2560-8526+ kcals (30-35+ Kcal/Kg)  Justification: repletion and " underweight  Estimated Protein Needs: 65-82+ grams protein (1.2-1.5+ g pro/Kg)  Justification: Repletion  Estimated Fluid Needs: 1 mL/kcal or per MD      MALNUTRITION:  % Weight Loss:  > 7.5% in 3 months (severe malnutrition)  % Intake:  </= 50% for >/= 1 month (severe malnutrition)  Subcutaneous Fat Loss:  Orbital region severe depletion, Upper arm region severe depletion and Thoracic region severe depletion  Muscle Loss:  Temporal region severe depletion, Clavicle bone region severe depletion, Scapular bone region severe depletion and Dorsal hand region severe depletion (did not observe LEs today)  Fluid Retention:  None noted    Malnutrition Diagnosis: Severe malnutrition  In Context of:  Chronic illness or disease    NUTRITION DIAGNOSIS:  Inadequate oral intake related to missing teeth, worsening fragility, dislike of dysphagia diet and poor appetite as evidenced by intake of 1 small meal/day for months up to a year      NUTRITION INTERVENTIONS  Recommendations / Nutrition Prescription  ADAT per SLP  Ensure patient orders 2-3 adequately sized meals/day   Chocolate Vital Cuisine BID w/ lunch and dinner     Strongly consider alternate means of nutrition     Implementation  Nutrition education: Per Provider order if indicated   Composition of Meals and Snacks, General/healthful diet and Medical Food Supplement: as above       Nutrition Goals  Patient will consume at least 50% meals and supplements BID vs nutrition support   No further wt loss <54 kg       MONITORING AND EVALUATION:  Progress towards goals will be monitored and evaluated per protocol and Practice Guidelines        Shoshana Bishop RD, LD  Clinical Dietitian   [facility], Ortho, Ortho spine  Pager: [phone]

## 2022-07-22 NOTE — PROGRESS NOTES
"Speech-Language Pathology: Clinical Swallow Evaluation     07/22/22 1300   General Information   Onset of Illness/Injury or Date of Surgery 07/21/22   Referring Physician Lizy Dias MD   Pertinent History of Current Problem Per H&P \"Efrem Ramos is a 77 year old male presents to hospital with asymptomatic hypotension.  The patient is chronically ill with numerous medical conditions for which he has nurses and therapist coming to his house to provide him care.  On Thursday his occupational therapist checked his blood pressure around noon and noted that it was low with a systolic blood pressure in the 70s.  Patient himself reports no complaints or symptoms.  He took a nap and then rechecked his blood pressure and found that it was low reportedly with a systolic in the 40s.  The patient reported mild dizziness at that time.  Due to hypotension the patient came to the hospital.  In the emergency room the patient's blood pressures have been within normal limits without intervention.  The patient denies any new complaints including fevers, chills, nausea, vomiting, diarrhea, chest pain, cough.  He is minimally ambulatory at baseline using a walker and is dependent on others for most of his cares.  The patient has a history of moderate to severe oropharyngeal dysphagia and has been recommended to eat a puréed diet with thickened liquids however he has been eating regular foods recently.  He denies any aspiration events.\".   General Observations Patient alert and cooperative.   Past History of Dysphagia Yes. Patient's last VFSS was on 2/28/22, results as follows \"Pt presents with moderate-severe oral-pharyngeal dysphagia per today's VFSS results.  Deficits include poor AP movement, weak base of tongue function, decreased epiglottic inversion, weak peristalsis, delayed swallows, and tight UES.  Deficits resulted in min-mod to mod pharyngeal residue, piecemeal swallows, flash penetration of mildly thick by cup and mod " "thick by tsp and silent penetration to the vocal folds with thin by tsp with and without a chin tuck.  Recommend a continued minced and moist diet and mildly thick liquids by tsp/very small sips by cup with precautions including: sit at 90 degrees, stay up for 60 minutes after eating, swallow-cough-swallow per bite/sip, extra hard swallows during and after meal, smaller meals at a sitting, crush meds with puree, use of pureed foods if preferred/easier to swallow.  Use cough-swallow after meals to clear residue and/or clear wet voicing if noted.  Recommend continued SLP swallow Tx at [facility] and/or [facility] SLP to assess diet tolerance, train strategies, and trial ice chips/thin water on a free water protocol with a supraglottic swallow technique if desired per pt/family.  Recommend close monitoring of pt's lung status and signs of aspiration with diet.  Nutrition/hydration status monitoring also recommended given current level of dysphagia and need for a restricted diet.  Educated pt/family after study.  Son verbalized understanding.   Will to defer to pt, family, MD if change of POC/wishes for po intake change for an advanced diet despite increased aspiration risk.\". The patient stated that he has been eating \"regular\" foods at home and not using swallow strategies. He stated \"look where it got me\".   Type of Evaluation   Type of Evaluation Swallow Evaluation   Oral Motor   Oral Musculature generally intact   Dentition (Oral Motor)   Dentition (Oral Motor) adequate dentition   Facial Symmetry (Oral Motor)   Facial Symmetry (Oral Motor) WNL   Lip Function (Oral Motor)   Lip Range of Motion (Oral Motor) WNL   Tongue Function (Oral Motor)   Tongue ROM (Oral Motor) WNL   Jaw Function (Oral Motor)   Jaw Function (Oral Motor) WNL   Cough/Swallow/Gag Reflex (Oral Motor)   Soft Palate/Velum (Oral Motor) WNL   Vocal Quality/Secretion Management (Oral Motor)   Vocal Quality (Oral Motor) WNL   Secretion Management (Oral Motor) WNL "   General Swallowing Observations   Respiratory Support (General Swallowing Observations) none   Current Diet/Method of Nutritional Intake (General Swallowing Observations, NIS) mildly thick liquids (level 2);pureed (level 4)   Swallowing Evaluation Clinical swallow evaluation   Clinical Swallow Evaluation   Clinical Swallow Evaluation Textures Trialed thin liquids;mildly thick liquids;pureed;minced & moist   Clinical Swallow Eval: Thin Liquid Texture Trial   Mode of Presentation, Thin Liquids spoon   Volume of Liquid or Food Presented x2 sips   Oral Phase of Swallow effortful AP movement   Pharyngeal Phase of Swallow coughing/choking   Clinical Swallow Eval: Mildly Thick Liquids   Mode of Presentation spoon;cup   Volume Presented 4oz   Oral Phase WFL   Pharyngeal Phase coughing/choking  (Immediate cough x1 via large cup sip.)   Successful Strategies Trialed During Procedure   (small sips via spoon)   Diagnostic Statement No s/s aspiration with mildly thick via spoon.   Clinical Swallow Evaluation: Puree Solid Texture Trial   Mode of Presentation, Puree spoon   Volume of Puree Presented 4oz   Oral Phase, Puree WFL   Pharyngeal Phase, Puree intact   Successful Strategies Trialed During Procedure, Puree   (alternate liquids/solids)   Diagnostic Statement Patient was able to alternate between liquids/solids.   Clinical Swallow Eval: Minced & Moist   Mode of Presentation spoon   Volume Presented x1 bite   Oral Phase WFL   Pharyngeal Phase feeling of something stuck in throat;coughing/choking   Diagnostic Statement Patient declined further trial.   Esophageal Phase of Swallow   Patient reports or presents with symptoms of esophageal dysphagia No   Swallowing Recommendations   Diet Consistency Recommendations mildly thick liquids (level 2);pureed (level 4)   Supervision Level for Intake 1:1 supervision needed   Mode of Delivery Recommendations bolus size, small;liquids via spoon only;no straws;slow rate of intake    Swallowing Maneuver Recommendations alternate food and liquid intake;throat clear-swallow   Monitoring/Assistance Required (Eating/Swallowing) monitor for cough or change in vocal quality with intake;stop eating activities when fatigue is present   Medication Administration Recommendations, Swallowing (SLP) crushed in puree   Instrumental Assessment Recommendations instrumental evaluation not recommended at this time  (VFSS may be warranted pending progression and diet tolerance.)   General Therapy Interventions   Planned Therapy Interventions Dysphagia Treatment   Dysphagia treatment Modified diet education;Instruction of safe swallow strategies;Compensatory strategies for swallowing   Clinical Impression   Criteria for Skilled Therapeutic Interventions Met (SLP Eval) Yes, treatment indicated   SLP Diagnosis Dysphagia   Functional Limitations Related to Problem List (SLP) Modified diet   Risks & Benefits of therapy have been explained evaluation/treatment results reviewed;care plan/treatment goals reviewed;patient   Clinical Impression Comments Patient has long hx of dysphagia and last VFSS was on 2/28/22. He presented with intermittent s/s aspiration during evaluation that were reduced with use of swallow strategies. Repeat VFSS pending progression and goals of care.   SLP Discharge Planning   SLP Discharge Recommendation Transitional Care Facility  (per PT/OT)   SLP Rationale for DC Rec Patient will likely meet dysphagia goals while in the hospital   SLP Brief overview of current status  Patient is okay to continue Puree textures and Mildy thick liquids with strategies to take liquids/solids by spoon and alternate liquids/solids. He will need 1:1 assist during meals to use strategies.    Total Evaluation Time   Total Evaluation Time (Minutes) 10

## 2022-07-22 NOTE — PROGRESS NOTES
[facility]    Hospitalist Progress Note    Date of Service (when I saw the patient): 07/22/2022    Assessment & Plan   Efrem Ramos is a 77 year old male who was admitted on 7/21/2022.  Efrem Ramos is a 77 year old male with numerous medical conditions presents to hospital for aspiration pneumonia     Depression pneumonia with history of dysphagia  Patient presented to hospital with asymptomatic hypotension which resolved prior to arrival. found to have groundglass opacities on CT scan of the chest.  Patient is not septic.  Vital signs are stable.  SARS COVID-negative.  Patient denying any cough, so unable to obtain sputum sample.  Patient is supposed to be on a puréed diet due to his dysphagia however he has been eating solid foods.  He denies any episodes of aspiration.  -doxycycline and ceftriaxone  Switched to ceftriaxone to IV cefepime to cover for aspiration pneumonia   Patient  has penicillin allergy which causes hives  -f/u blood culture  Path consultation requested  Legionella and strep pneumoniae antigens returned negative  Blood cultures remain negative     Hypotension  Patient with ongoing hypotension prior to admission and also currently blood pressure in the 80s  Given 250 cc fluid bolus this morning  We will give 1 L of normal saline over the next 4 hours to help with hypotension  Continue maintenance IV fluids  If no improvement in blood pressure with IV fluids we will plan on giving him IV albumin  Stat echo ordered to assess fluid status and LV function       Dysphagia  Severe protein calorie malnutrition  Patient is underweight with visible muscle wasting.  He has been losing weight for several years due to his dysphagia.  Minimally ambulatory at baseline with a walker.  -Follow-up swallow evaluation  -Follow-up PT OT  -Follow nutrition consult     Afib/flutter  severe aortic stenosis  HTN  HLD  PVD  CAD   Patient denies any current chest pain.  EKG with GRANT  fib.  -Resume PTA atorvastatin, Eliquis, digoxin, and Plavix  -Follow-up digoxin level     Hx of CVA  -Plavix and statin     MEL  -CPAP     Mild cognitive impairment.   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  Noted.     Pulmonary fibrosis  Not on home O2           Code Status []Expand by Default     Full Code  DVT ppx: Continue PTA Eliquis  Expected length of stay greater than 2 days              Discharge disposition: Expected discharge in the next 2-3days once BP improves and treatment of Pneumonia    Lizy Dias MD, MD  [phone] (P)      Interval History      Patient is resting comfortably in bed.  Complains of dizziness with walking.  Blood pressure is soft systolic ranging in the high 70s to 80s.  Denies any shortness of breath or cough.  Blood cultures remain negative    -Data reviewed today: I reviewed all new labs and imaging results over the last 24 hours. I personally reviewed all the imaging since admission     Physical Exam   Temp: 97.2  F (36.2  C) Temp src: Oral BP: (!) 78/55 Pulse: 83   Resp: 14 SpO2: 95 % O2 Device: None (Room air)    Vitals:    07/21/22 1649 07/22/22 0603   Weight: 53.1 kg (117 lb) 54.5 kg (120 lb 2.4 oz)     Vital Signs with Ranges  Temp:  [97.1  F (36.2  C)-97.6  F (36.4  C)] 97.2  F (36.2  C)  Pulse:  [56-87] 83  Resp:  [9-51] 14  BP: ()/() 78/55  SpO2:  [92 %-100 %] 95 %  I/O last 3 completed shifts:  In: 120 [P.O.:120]  Out: 150 [Urine:150]    Constitutional: Awake, alert, cooperative, no apparent distress  Respiratory: Clear to auscultation bilaterally, no crackles or wheezing  Cardiovascular: Regular rate and rhythm, normal S1 and S2, and 2+ murmur noted  GI: Normal bowel sounds, soft, non-distended, non-tender  Skin/Integumen: No rashes, no cyanosis, no edema  Other:     Medications     0.9% sodium chloride + KCl 20 mEq/L 100 mL/hr at 07/22/22 1000     - MEDICATION  INSTRUCTIONS -         sodium chloride 0.9%  1,000 mL Intravenous Once     apixaban ANTICOAGULANT  5 mg Oral BID     atorvastatin  40 mg Oral QPM     ceFEPIme (MAXIPIME) IV  1 g Intravenous Q12H     clopidogrel  75 mg Oral QPM     digoxin  125 mcg Oral Daily     doxycycline hyclate  100 mg Oral Q12H [facility] (08/20)     gabapentin  300 mg Oral QAM     gabapentin  600 mg Oral At Bedtime     mirtazapine  15 mg Oral At Bedtime     sodium chloride (PF)  3 mL Intracatheter Q8H       Data   Recent Labs   Lab 07/21/22  1717   WBC 10.0   HGB 10.4*   MCV 96         POTASSIUM 3.7   CHLORIDE 102   CO2 28   BUN 18   CR 0.93   ANIONGAP 5   ULISSES 8.5   *   ALBUMIN 2.4*   PROTTOTAL 7.7   BILITOTAL 0.5   ALKPHOS 83   ALT 12   AST 23       Recent Results (from the past 24 hour(s))   XR Chest 2 Views    Narrative    EXAM: XR CHEST 2 VIEWS  LOCATION: [facility]  DATE/TIME: 7/21/2022 6:09 PM    INDICATION: Weakness.  COMPARISON: 05/19/2020.      Impression    IMPRESSION: Progressively increased interstitial opacity throughout the right lung, of uncertain chronicity. This may reflect a combination of fibrosis versus acute asymmetric edema or atypical infectious infiltrate.    Chronic blunting of the right costophrenic angle, favoring scar. No left pleural effusion. No pneumothorax.    Upper limits of normal heart size. Atheromatous calcifications of the thoracic aorta. Additional vascular calcifications of the subclavian and axillary arteries.   Chest CT w/o contrast    Narrative    EXAM: CT CHEST W/O CONTRAST  LOCATION: [facility]  DATE/TIME: 7/21/2022 8:30 PM    INDICATION: abnormal x ray, weakness  COMPARISON: 03/10/2022  TECHNIQUE: CT chest without IV contrast. Multiplanar reformats were obtained. Dose reduction techniques were used.  CONTRAST: None.    FINDINGS:   LUNGS AND PLEURA: Increased bilateral groundglass and interstitial airspace opacities superimposed upon  background fibrosis with reticulation, interlobular septal thickening and right basilar honeycombing. No pleural effusion. Trace pleural effusions.    MEDIASTINUM/AXILLAE: Borderline and mildly enlarged mediastinal and hilar lymph nodes, likely reactive. Mitral annular calcifications.    CORONARY ARTERY CALCIFICATION: Severe.    UPPER ABDOMEN: Cholelithiasis    MUSCULOSKELETAL: No aggressive or destructive lesion. Unchanged compression deformities and vertebroplasty in the lower thoracic spine.      Impression    IMPRESSION:   1.  New bilateral groundglass opacities most suggestive of atypical multifocal pneumonia.    2.  Background fibrosis and interstitial lung disease.    3.  Tiny bilateral pleural effusions.

## 2022-07-22 NOTE — PLAN OF CARE
OT: Orders received. Chart reviewed and discussed with care team.  OT not indicated due to per discussion w/ care team pt having difficulty w/ ADLs at baseline, has groceries delivered and has adult son available intermittently; pt will require TCU to progress functional IND, safety w/ ADL/IADLs prior to returning to home.  Defer discharge recommendations to current care team and next level of care.  Will complete orders.

## 2022-07-22 NOTE — PHARMACY-ADMISSION MEDICATION HISTORY
Pharmacy Medication History  Admission medication history interview status for the 7/21/2022  admission is complete. See EPIC admission navigator for prior to admission medications     Location of Interview: Phone  Medication history sources: Patient's family/friend (son) and [employer]. Son fills pill box for patient    Significant changes made to the medication list:  -Added gabapentin 300mg in morning, in addition to 600mg at bedtime.  -Changed meclizine to prn    In the past week, patient estimated taking medication this percent of the time: greater than 90%    Additional medication history information:   -Verified with son that apixaban and clopidogrel are current medications. Apixaban last filled [employer] 4/12/22 #40 for 20 day supply and clopidogrel 3/29/22 #90 for 90 day supply. Son reports obtaining some medications from [facility].    Medication reconciliation completed by provider prior to medication history? No    Time spent in this activity: 15 min    Prior to Admission medications    Medication Sig Last Dose Taking? Auth Provider Long Term End Date   acetaminophen (TYLENOL 8 HOUR ARTHRITIS PAIN) 650 MG CR tablet Take 650 mg by mouth 2 times daily 7/21/2022 at am Yes Unknown, Entered By History No    apixaban ANTICOAGULANT (ELIQUIS) 5 MG tablet Take 1 tablet (5 mg) by mouth 2 times daily 7/21/2022 at am Yes Caterina Wright PA-C No    atorvastatin (LIPITOR) 40 MG tablet Take 1 tablet (40 mg) by mouth daily  Patient taking differently: Take 40 mg by mouth every evening 7/20/2022 at pm Yes Cherise Raymond PA-C Yes    calcium carbonate 600 mg-vitamin D 400 units (CALTRATE) 600-400 MG-UNIT per tablet Take 1 tablet by mouth 2 times daily  7/21/2022 at am Yes Reported, Patient     clopidogrel (PLAVIX) 75 MG tablet Take 1 tablet (75 mg) by mouth daily  Patient taking differently: Take 75 mg by mouth every evening 7/20/2022 at pm Yes Tariq Cortes MD Yes    digoxin (LANOXIN)  125 MCG tablet Take 1 tablet (125 mcg) by mouth daily 7/21/2022 at am Yes Cherise Raymond PA-C Yes    gabapentin (NEURONTIN) 300 MG capsule Take 300 mg by mouth every morning 7/21/2022 at am Yes Unknown, Entered By History Yes    gabapentin (NEURONTIN) 300 MG capsule Take 2 capsules (600 mg) by mouth At Bedtime 7/20/2022 at pm Yes Danny Paige MD Yes    meclizine (ANTIVERT) 25 MG tablet Take 1 tablet (25 mg) by mouth 2 times daily  Patient taking differently: Take 25 mg by mouth daily as needed More than a month Yes Haase, Tonya Lynn, APRN CNP     mirtazapine (REMERON) 15 MG tablet Take 1 tablet (15 mg) by mouth At Bedtime 7/20/2022 at pm Yes Danny Paige MD Yes    multivitamin (OCUVITE) TABS Take 1 tablet by mouth daily  More than a month at unable to swallow Yes Unknown, Entered By History     senna-docusate (SENOKOT-S/PERICOLACE) 8.6-50 MG tablet Take 2 tablets by mouth 2 times daily as needed for constipation prn Yes Reported, Patient     diclofenac (VOLTAREN) 1 % topical gel Apply 4 g topically 4 times daily as needed for moderate pain  Patient not taking: Reported on 7/21/2022 Not Taking at Unknown time  Danny Paige MD         The information provided in this note is only as accurate as the sources available at the time of update(s)

## 2022-07-22 NOTE — PLAN OF CARE
Goal Outcome Evaluation:      Pt is A&O x4. A1, Gb and walker. Up with PT this shift, complained of weakness. Low BP this shift, MD aware. Gave NaCl 250 mL/hr  bolus earlier in shift. IV currently infusing 1000 mL NaCl bolus at 250 mL/hr. SOB upon exertion. Pureed diet with mildly thick liquids via spoon (no straws). Pt doesn't have a large appetite. Instructed by speech to aid in feeding patient. Voiding per urinal with adequate output. Discharge pending plan.

## 2022-07-22 NOTE — PLAN OF CARE
Goal Outcome Evaluation:    Pt. arrived to the unit @23:30. A&O x4. VSS-slightly soft on RA. Pt. stated feeling weak in general & wanted to rest. Pt. had 1 loose stool this shift. Denies lightheaded, chest pain, or SOB. Tylenol for pain. Offered 2 puddings & tolerated very well. Takes pills whole w/ thicken liquid. CMS intact. Urine sample collected & sent. Speech following. PT & OT following as well as nutritionist. Will cont' to monitor.

## 2022-07-22 NOTE — PROGRESS NOTES
RECEIVING UNIT ED HANDOFF REVIEW    ED Nurse Handoff Report was reviewed by: Marylu Griffiths RN on July 21, 2022 at 10:48 PM

## 2022-07-22 NOTE — ED NOTES
[facility]  ED Nurse Handoff Report    ED Chief complaint: Hypotension      ED Diagnosis:   Final diagnoses:   None       Code Status: admitting physician to determine    Allergies:   Allergies   Allergen Reactions     Sulfa Drugs Difficulty breathing, Swelling and Hives     Adhesive Tape Blisters     Amiodarone Other (See Comments)     Developed pleural effusion     Cephalosporins      Tolerated ceftriaxone      Penicillins Hives     Reaction occurred as a child  Tolerated ceftriaxone in past        Patient Story:aleida Ramos is a 77 year old male who presents with hypotension.  Patient reportedly did not sleep well last night and then had physical therapy and therapy today.  The occupational therapist to check his blood pressure and noted it to be 79 systolic.  Patient's family member let him sleep because he was so tired and then when he woke up he thought it was systolic in the 40s.  Patient felt a little bit of dizziness.  He states he has been drinking plenty of water.  Denies nausea, vomiting, diarrhea patient denies chest pain.  He states that his baseline blood pressures are around 100/60.  Patient denies any fevers, sweats, chills.  Denies any recent falls or head injury.  Focused Assessment:  VSS, A&Ox4, feeling weak    Treatments and/or interventions provided:   Labs Ordered and Resulted from Time of ED Arrival to Time of ED Departure   COMPREHENSIVE METABOLIC PANEL - Abnormal       Result Value    Sodium 135      Potassium 3.7      Chloride 102      Carbon Dioxide (CO2) 28      Anion Gap 5      Urea Nitrogen 18      Creatinine 0.93      Calcium 8.5      Glucose 100 (*)     Alkaline Phosphatase 83      AST 23      ALT 12      Protein Total 7.7      Albumin 2.4 (*)     Bilirubin Total 0.5      GFR Estimate 84     ROUTINE UA WITH MICROSCOPIC REFLEX TO CULTURE - Abnormal    Color Urine Light Yellow      Appearance Urine Clear      Glucose Urine Negative      Bilirubin Urine Negative       Ketones Urine Negative      Specific Gravity Urine 1.015      Blood Urine Negative      pH Urine 6.0      Protein Albumin Urine Negative      Urobilinogen Urine Normal      Nitrite Urine Negative      Leukocyte Esterase Urine Negative      Mucus Urine Present (*)     RBC Urine <1      WBC Urine <1     CBC WITH PLATELETS AND DIFFERENTIAL - Abnormal    WBC Count 10.0      RBC Count 3.35 (*)     Hemoglobin 10.4 (*)     Hematocrit 32.2 (*)     MCV 96      MCH 31.0      MCHC 32.3      RDW 15.9 (*)     Platelet Count 364      % Neutrophils 71      % Lymphocytes 14      % Monocytes 9      % Eosinophils 4      % Basophils 1      % Immature Granulocytes 1      NRBCs per 100 WBC 0      Absolute Neutrophils 7.1      Absolute Lymphocytes 1.4      Absolute Monocytes 0.9      Absolute Eosinophils 0.4      Absolute Basophils 0.1      Absolute Immature Granulocytes 0.1      Absolute NRBCs 0.0     NT PROBNP INPATIENT - Abnormal    N terminal Pro BNP Inpatient 4,554 (*)    LACTIC ACID WHOLE BLOOD - Normal    Lactic Acid 1.6     TROPONIN I - Normal    Troponin I High Sensitivity 20     PROCALCITONIN - Normal    Procalcitonin <0.05     OCCULT BLOOD STOOL - Normal    Occult Blood Negative     COVID-19 VIRUS (CORONAVIRUS) BY PCR   BLOOD CULTURE   BLOOD CULTURE     Chest CT w/o contrast   Final Result   IMPRESSION:    1.  New bilateral groundglass opacities most suggestive of atypical multifocal pneumonia.      2.  Background fibrosis and interstitial lung disease.      3.  Tiny bilateral pleural effusions.         XR Chest 2 Views   Final Result   IMPRESSION: Progressively increased interstitial opacity throughout the right lung, of uncertain chronicity. This may reflect a combination of fibrosis versus acute asymmetric edema or atypical infectious infiltrate.      Chronic blunting of the right costophrenic angle, favoring scar. No left pleural effusion. No pneumothorax.      Upper limits of normal heart size. Atheromatous calcifications  of the thoracic aorta. Additional vascular calcifications of the subclavian and axillary arteries.          Patient's response to treatments and/or interventions: monitor    To be done/followed up on inpatient unit:  monitor     Does this patient have any cognitive concerns?: NA    Activity level - Baseline/Home:  Stand with Assist  Activity Level - Current:   Stand with Assist    Patient's Preferred language: English   Needed?: No    Isolation: None  Infection: Not Applicable  Patient tested for COVID 19 prior to admission: YES  Bariatric?: No    Vital Signs:   Vitals:    07/21/22 1940 07/21/22 2000 07/21/22 2020 07/21/22 2100   BP: 115/69 115/71 (!) 118/101 110/75   Pulse: 71 66 65 56   Resp: 22 11 12 29   Temp:       TempSrc:       SpO2: 100% 98% 98% 99%   Weight:           Cardiac Rhythm:     Was the PSS-3 completed:   Yes  What interventions are required if any?               Family Comments: given to patient   OBS brochure/video discussed/provided to patient/family: Yes              Name of person given brochure if not patient:   family              Relationship to patient: family    For the majority of the shift this patient's behavior was Green.   Behavioral interventions performed were NA.    ED NURSE PHONE NUMBER: *[id number]

## 2022-07-22 NOTE — H&P
[facility]    History and Physical  Hospitalist     Date of Admission:  7/21/2022    Assessment & Plan   Efrem Ramos is a 77 year old male with numerous medical conditions presents to hospital for community-acquired pneumonia.    Community-acquired pneumonia  Patient presented to hospital with asymptomatic hypotension which resolved prior to arrival. found to have groundglass opacities on CT scan of the chest.  Patient is not septic.  Vital signs are stable.  SARS COVID-negative.  Patient denying any cough, so unable to obtain sputum sample.  Patient is supposed to be on a puréed diet due to his dysphagia however he has been eating solid foods.  He denies any episodes of aspiration.  -doxycycline and ceftriaxone  -f/u blood culture  -swallow evaluation  -Follow-up strep pneumo antigen and Legionella antigen    Hypotension  Patient had asymptomatic hypotension at home however all blood pressure measurements in the emergency room have been stable.  Possibly related to his pneumonia versus poor technique when measuring blood pressure.  Will monitor with regular blood pressure checks.    Dysphagia  Severe protein calorie malnutrition  Patient is underweight with visible muscle wasting.  He has been losing weight for several years due to his dysphagia.  Minimally ambulatory at baseline with a walker.  -Follow-up swallow evaluation  -Follow-up PT OT  -Follow nutrition consult    Afib/flutter  severe aortic stenosis  HTN  HLD  PVD  CAD   Patient denies any current chest pain.  EKG with A. fib.  -Resume PTA atorvastatin, Eliquis, digoxin, and Plavix  -Follow-up digoxin level    Hx of CVA  -Plavix and statin    MEL  -CPAP    Mild cognitive impairment.   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  Noted.    Pulmonary fibrosis  Not on home O2    Code Status   Full Code  DVT ppx: Continue PTA Eliquis  Expected length of  stay greater than 2 days      Primary Care Physician   Danny Paige MD    Chief Complaint   Hypotension    History obtained from the patient and the medical chart    History of Present Illness   Efrem Ramos is a 77 year old male presents to hospital with asymptomatic hypotension.  The patient is chronically ill with numerous medical conditions for which he has nurses and therapist coming to his house to provide him care.  On Thursday his occupational therapist checked his blood pressure around noon and noted that it was low with a systolic blood pressure in the 70s.  Patient himself reports no complaints or symptoms.  He took a nap and then rechecked his blood pressure and found that it was low reportedly with a systolic in the 40s.  The patient reported mild dizziness at that time.  Due to hypotension the patient came to the hospital.  In the emergency room the patient's blood pressures have been within normal limits without intervention.  The patient denies any new complaints including fevers, chills, nausea, vomiting, diarrhea, chest pain, cough.  He is minimally ambulatory at baseline using a walker and is dependent on others for most of his cares.  The patient has a history of moderate to severe oropharyngeal dysphagia and has been recommended to eat a puréed diet with thickened liquids however he has been eating regular foods recently.  He denies any aspiration events.    Past Medical History    I have reviewed this patient's medical history and updated it with pertinent information if needed.   Past Medical History:   Diagnosis Date     Atrial fibrillation (H)     amiodarone therapy discontinued due to pulmonary toxicity     Atrial flutter (H)      Benign essential hypertension 11/20/2018     Cancer (H) vocal cord     Carpal tunnel syndrome     abstracted 7/3/02.     Coronary artery disease involving native coronary artery of native heart without angina pectoris 05/08/2020    Cath 5/28/2020:  patent stents; Cath 5/18/2020: ISABEL x4 to RCA; Cath 3/2020: 1 vessel disease; Cath 2017: moderate 2 vessel disease     CVA (cerebral infarction) 05/05/2015     Demyelinating disease of central nervous system, unspecified (H)     abstracted 7/3/02. 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     Pneumonia 08/17/2016     PVD (peripheral vascular disease) (H)      Redundant colon     needs CT colonography     Shingles      SKIN DISORDERS NEC 03/15/2005     Sleep apnea      Sleep apnea      SVT (supraventricular tachycardia) (H)        Past Surgical History   I have reviewed this patient's surgical history and updated it with pertinent information if needed.  Past Surgical History:   Procedure Laterality Date     BIOPSY  brain 2002     BONE MARROW BIOPSY, BONE SPECIMEN, NEEDLE/TROCAR N/A 6/8/2017    Procedure: BIOPSY BONE MARROW;  UNILATERAL BONE MARROW BIOPSY (CONSCIOUS SEDATION) ;  Surgeon: Jamie Gonzales MD;  Location:  GI     BONE MARROW BIOPSY, BONE SPECIMEN, NEEDLE/TROCAR N/A 2/23/2022    Procedure: BIOPSY, BONE MARROW;  Surgeon: Carmelita Aguilera MD;  Location:  GI     CARDIAC CATHERIZATION  09/05/2017    2V CAD, IFR of RCA 0.95     CV CORONARY ANGIOGRAM N/A 3/23/2020    Procedure: Coronary Angiogram and right heart cath;  Surgeon: Gino Ferrer,  MD;  Location:  HEART CARDIAC CATH LAB     CV HEART CATHETERIZATION WITH POSSIBLE INTERVENTION N/A 5/28/2020    Procedure: Heart Catheterization with Possible Intervention;  Surgeon: Larry Chawla MD;  Location:  HEART CARDIAC CATH LAB     CV HEART CATHETERIZATION WITH POSSIBLE INTERVENTION N/A 5/18/2020    Procedure: Heart Catheterization with Possible Intervention;  Surgeon: Gaurang Swenson MD;  Location:  HEART CARDIAC CATH LAB     CV INSTANTANEOUS WAVE-FREE RATIO N/A 3/23/2020    Procedure: Instantaneous Wave-Free Ratio;  Surgeon: Gino Ferrer MD;  Location:  HEART CARDIAC CATH LAB     CV PCI ATHERECTOMY ORBITAL N/A 5/18/2020    Procedure: Percutaneous Coronary Intervention Atherectomy Rotational;  Surgeon: Gaurang Swenson MD;  Location:  HEART CARDIAC CATH LAB     CV PCI STENT DRUG ELUTING N/A 5/18/2020    Procedure: Percutaneous Coronary Intervention Stent Drug Eluting;  Surgeon: Gaurang Swenson MD;  Location:  HEART CARDIAC CATH LAB     CV RIGHT HEART CATH MEASUREMENTS RECORDED N/A 5/28/2020    Procedure: Right Heart Cath;  Surgeon: Larry Chawla MD;  Location:  HEART CARDIAC CATH LAB     CV TEMPORARY PACEMAKER INSERTION N/A 5/18/2020    Procedure: Temporary Pacemaker Insertion;  Surgeon: Gaurang Swenson MD;  Location:  HEART CARDIAC CATH LAB     ESOPHAGOSCOPY, GASTROSCOPY, DUODENOSCOPY (EGD), COMBINED N/A 9/8/2018    Procedure: COMBINED ESOPHAGOSCOPY, GASTROSCOPY, DUODENOSCOPY (EGD), BIOPSY SINGLE OR MULTIPLE;;  Surgeon: Xander Marie MD;  Location:  GI     HEAD & NECK SURGERY       ORTHOPEDIC SURGERY      right arm ulna reset after injury     THORACOSCOPIC WEDGE RESECTION LUNG Right 8/2/2016    Procedure: THORACOSCOPIC WEDGE RESECTION LUNG;  Surgeon: Abdelrahman Noriega MD;  Location:  OR     [facility] NONSPECIFIC PROCEDURE  as a child    T & A. abstracted 7/3/02.     ZZC NONSPECIFIC PROCEDURE  early    CTR. abstracted 7/3/02.        Prior to Admission Medications   Prior to Admission Medications   Prescriptions Last Dose Informant Patient Reported? Taking?   Calcium Carbonate-Vit D-Min (CALCIUM 600+D PLUS MINERALS) 600-400 MG-UNIT TABS   Yes No   Sig: Take 1 tablet by mouth   acetaminophen (TYLENOL) 325 MG tablet   Yes No   Sig: Take 325-650 mg by mouth every 6 hours as needed for mild pain    apixaban ANTICOAGULANT (ELIQUIS) 5 MG tablet   No No   Sig: Take 1 tablet (5 mg) by mouth 2 times daily   Patient not taking: Reported on 6/13/2022   atorvastatin (LIPITOR) 40 MG tablet   No No   Sig: Take 1 tablet (40 mg) by mouth daily   calcium carbonate 600 mg-vitamin D 400 units (CALTRATE) 600-400 MG-UNIT per tablet   Yes No   Sig: Take 1 tablet by mouth 2 times daily    clopidogrel (PLAVIX) 75 MG tablet   No No   Sig: Take 1 tablet (75 mg) by mouth daily   Patient not taking: Reported on 6/13/2022   diclofenac (VOLTAREN) 1 % topical gel   No No   Sig: Apply 4 g topically 4 times daily as needed for moderate pain   digoxin (LANOXIN) 125 MCG tablet   No No   Sig: Take 1 tablet (125 mcg) by mouth daily   gabapentin (NEURONTIN) 300 MG capsule   No No   Sig: Take 2 capsules (600 mg) by mouth At Bedtime   meclizine (ANTIVERT) 25 MG tablet   No No   Sig: Take 1 tablet (25 mg) by mouth 2 times daily   melatonin 1 MG TABS tablet   Yes No   Sig: Take 1 mg by mouth At Bedtime ALSO TAKING 1 MG PO HS PRN FOR INSOMNIA   mirtazapine (REMERON) 15 MG tablet   No No   Sig: Take 1 tablet (15 mg) by mouth At Bedtime   multivitamin (OCUVITE) TABS  Self Yes No   Sig: Take 1 tablet by mouth daily    senna-docusate (SENOKOT-S/PERICOLACE) 8.6-50 MG tablet   Yes No   Sig: Take 2 tablets by mouth 2 times daily as needed for constipation      Facility-Administered Medications: None     Allergies   Allergies   Allergen Reactions     Sulfa Drugs Difficulty breathing, Swelling and Hives     Adhesive Tape Blisters     Amiodarone Other (See Comments)     Developed pleural  effusion     Cephalosporins      Tolerated ceftriaxone      Penicillins Hives     Reaction occurred as a child  Tolerated ceftriaxone in past        Social History   I have reviewed this patient's social history and updated it with pertinent information if needed. Efrem Ramos  reports that he quit smoking about 8 years ago. His smoking use included cigarettes. He has a 30.00 pack-year smoking history. He has never used smokeless tobacco. He reports previous alcohol use of about 42.0 standard drinks of alcohol per week. He reports that he does not use drugs.    Family History   I have reviewed this patient's family history and updated it with pertinent information if needed.   Family History   Problem Relation Age of Onset     Blood Disease Mother         Anemia     Cardiovascular Father      Cancer - colorectal Maternal Grandfather      Hypertension Brother      Diabetes Sister 5        Juvinile Diabetes passed at 36     Respiratory Other         Lung Cancer       Review of Systems   The 10 point Review of Systems is negative other than noted in the HPI or here.     Physical Exam   Temp: 97.1  F (36.2  C) Temp src: Temporal BP: 110/75 Pulse: 56   Resp: 29 SpO2: 99 % O2 Device: None (Room air)    Vital Signs with Ranges  Temp:  [97.1  F (36.2  C)] 97.1  F (36.2  C)  Pulse:  [56-86] 56  Resp:  [11-51] 29  BP: ()/() 110/75  SpO2:  [92 %-100 %] 99 %  117 lbs 0 oz  Physical Exam  Vitals reviewed.   Constitutional:       Appearance: Normal appearance.      Comments: Thin elderly man appears older than stated age seen resting in bed comfortably no apparent distress.   HENT:      Head: Normocephalic and atraumatic.      Mouth/Throat:      Mouth: Mucous membranes are moist.      Pharynx: Oropharynx is clear.   Eyes:      Extraocular Movements: Extraocular movements intact.      Conjunctiva/sclera: Conjunctivae normal.      Pupils: Pupils are equal, round, and reactive to light.   Cardiovascular:      Rate and  Rhythm: Normal rate and regular rhythm.      Pulses: Normal pulses.      Heart sounds: Normal heart sounds. No murmur heard.  Pulmonary:      Effort: Pulmonary effort is normal.      Breath sounds: No wheezing or rhonchi.      Comments: Fine crackles throughout lung fields  Abdominal:      General: Abdomen is flat. Bowel sounds are normal.      Palpations: Abdomen is soft. There is no mass.      Tenderness: There is no abdominal tenderness. There is no guarding.   Musculoskeletal:         General: No swelling. Normal range of motion.      Cervical back: Normal range of motion and neck supple.   Skin:     General: Skin is warm and dry.   Neurological:      Mental Status: He is alert and oriented to person, place, and time. Mental status is at baseline.      Cranial Nerves: No cranial nerve deficit.      Comments: Right lower extremity weaker than left, this is chronic.

## 2022-07-22 NOTE — CONSULTS
Cardiology Progress Note          Assessment and Plan:   Hypotension  Aspiration Pneumonia  Severe Aortic stenosis  Atrial Fibrillation    Recheck echocardiogram  Recommend evaluation in TAVR clinic as outpatient after recovered from pneumonia                 Interval History:     Alfredo Ramos is a pleasant 77-year-old very frail gentleman who I saw back last year for in structural heart clinic for consideration transcatheter valve replacement.  He is a patient of Dr. Aragon.  He is now admitted with numerous medical conditions including dysphagia aspiration pneumonia depression hypotension.  Cardiology was consulted due to I believe low blood pressure and aortic stenosis however this is been known and known to be severely with low-flow low gradient aortic stenosis and preserved left ventricular ejection fraction.    He has had multiple medical problems.  He had significant weight loss and.  He is now admitted with groundglass opacities on CT scan.  Started on IV antibiotics and given IV fluids for hypotension.  He denies any chest pain but has had some dyspnea and shortness of breath.  He does little activity.    This is a difficult situation very frail patient at risk.  She has TAVR CT CT scan is also difficult with a transcatheter approach and will require either shockwave via transfemoral approach or alternative access would be high risk for this frail gentleman.  On any account patient clearly needs to recover from his aspiration pneumonia.  We will recheck an echocardiogram which are again confirming probably progression of his aortic valve disease.  We will follow along with as needed.              Review of Systems:   As per subjective, otherwise 5 systems reviewed and negative.           Physical Exam:   Blood pressure (!) 82/53, pulse 96, temperature 98.2  F (36.8  C), temperature source Oral, resp. rate 16, weight 54.5 kg (120 lb 2.4 oz), SpO2 97 %.      Vital Sign Ranges  Temperature Temp  Av.5  F  (36.4  C)  Min: 97.1  F (36.2  C)  Max: 98.2  F (36.8  C)   Blood pressure Systolic (24hrs), Av , Min:71 , Max:123        Diastolic (24hrs), Av, Min:52, Max:101      Pulse Pulse  Av.9  Min: 56  Max: 96   Respirations Resp  Av.8  Min: 9  Max: 51   Pulse oximetry SpO2  Av.7 %  Min: 92 %  Max: 100 %         Intake/Output Summary (Last 24 hours) at 2022 1548  Last data filed at 2022 1330  Gross per 24 hour   Intake 600 ml   Output 500 ml   Net 100 ml       Constitutional:   NAD   Skin:   Warm and dry   Head:   Nontraumatic   Neck:   Supple, symmetrical, trachea midline, no adenopathy, thyroid symmetric, not enlarged and no tenderness, skin normal   Lungs:   normal   Cardiovascular:   normal apical pulses  and murmurs include systolic murmur III/VI located at left lower sternal border with radiation to the carotids    Abdomen:   Benign   Extremities and Back:   Symmetric, no curvature, spinous processes are non-tender on palpation, paraspinous muscles are non-tender on palpation, no costal vertebral tenderness   Neurological:   Grossly nonfocal            Medications:     No current outpatient medications on file.                Data:     Results for orders placed or performed during the hospital encounter of 22 (from the past 24 hour(s))   EKG 12-lead, tracing only   Result Value Ref Range    Systolic Blood Pressure  mmHg    Diastolic Blood Pressure  mmHg    Ventricular Rate 80 BPM    Atrial Rate 326 BPM    DE Interval  ms    QRS Duration 104 ms     ms    QTc 435 ms    P Axis  degrees    R AXIS -70 degrees    T Axis 35 degrees    Interpretation ECG       Atrial fibrillation  Incomplete right bundle branch block  Left anterior fascicular block  Nonspecific ST abnormality  Abnormal ECG  When compared with ECG of 10-BAL-2022 21:51,  No significant change was found  Confirmed by GENERATED REPORT, COMPUTER ([id number]),  Wade Manley ([id number]) on 2022 5:31:02 PM     CBC with  platelets differential    Narrative    The following orders were created for panel order CBC with platelets differential.  Procedure                               Abnormality         Status                     ---------                               -----------         ------                     CBC with platelets and d...[[id number]]  Abnormal            Final result                 Please view results for these tests on the individual orders.   Comprehensive metabolic panel   Result Value Ref Range    Sodium 135 133 - 144 mmol/L    Potassium 3.7 3.4 - 5.3 mmol/L    Chloride 102 94 - 109 mmol/L    Carbon Dioxide (CO2) 28 20 - 32 mmol/L    Anion Gap 5 3 - 14 mmol/L    Urea Nitrogen 18 7 - 30 mg/dL    Creatinine 0.93 0.66 - 1.25 mg/dL    Calcium 8.5 8.5 - 10.1 mg/dL    Glucose 100 (H) 70 - 99 mg/dL    Alkaline Phosphatase 83 40 - 150 U/L    AST 23 0 - 45 U/L    ALT 12 0 - 70 U/L    Protein Total 7.7 6.8 - 8.8 g/dL    Albumin 2.4 (L) 3.4 - 5.0 g/dL    Bilirubin Total 0.5 0.2 - 1.3 mg/dL    GFR Estimate 84 >60 mL/min/1.73m2   Lactic acid whole blood   Result Value Ref Range    Lactic Acid 1.6 0.7 - 2.0 mmol/L   Troponin I   Result Value Ref Range    Troponin I High Sensitivity 20 <79 ng/L   CBC with platelets and differential   Result Value Ref Range    WBC Count 10.0 4.0 - 11.0 10e3/uL    RBC Count 3.35 (L) 4.40 - 5.90 10e6/uL    Hemoglobin 10.4 (L) 13.3 - 17.7 g/dL    Hematocrit 32.2 (L) 40.0 - 53.0 %    MCV 96 78 - 100 fL    MCH 31.0 26.5 - 33.0 pg    MCHC 32.3 31.5 - 36.5 g/dL    RDW 15.9 (H) 10.0 - 15.0 %    Platelet Count 364 150 - 450 10e3/uL    % Neutrophils 71 %    % Lymphocytes 14 %    % Monocytes 9 %    % Eosinophils 4 %    % Basophils 1 %    % Immature Granulocytes 1 %    NRBCs per 100 WBC 0 <1 /100    Absolute Neutrophils 7.1 1.6 - 8.3 10e3/uL    Absolute Lymphocytes 1.4 0.8 - 5.3 10e3/uL    Absolute Monocytes 0.9 0.0 - 1.3 10e3/uL    Absolute Eosinophils 0.4 0.0 - 0.7 10e3/uL    Absolute Basophils 0.1 0.0  - 0.2 10e3/uL    Absolute Immature Granulocytes 0.1 <=0.4 10e3/uL    Absolute NRBCs 0.0 10e3/uL   Procalcitonin   Result Value Ref Range    Procalcitonin <0.05 <0.05 ng/mL   BNP   Result Value Ref Range    N terminal Pro BNP Inpatient 4,554 (H) 0 - 1,800 pg/mL   Blood Culture Peripheral Blood    Specimen: Peripheral Blood   Result Value Ref Range    Culture No growth after 12 hours    [place] Draw    Narrative    The following orders were created for panel order [place] Draw.  Procedure                               Abnormality         Status                     ---------                               -----------         ------                     Extra Blue Top Tube[[id number]]                              Final result               Extra Red Top Tube[[id number]]                               Final result                 Please view results for these tests on the individual orders.   Extra Blue Top Tube   Result Value Ref Range    Hold Specimen JIC    Extra Red Top Tube   Result Value Ref Range    Hold Specimen JIC    Digoxin level   Result Value Ref Range    Digoxin 1.4   ug/L   Blood Culture Peripheral Blood    Specimen: Peripheral Blood   Result Value Ref Range    Culture No growth after 12 hours    XR Chest 2 Views    Narrative    EXAM: XR CHEST 2 VIEWS  LOCATION: [facility]  DATE/TIME: 7/21/2022 6:09 PM    INDICATION: Weakness.  COMPARISON: 05/19/2020.      Impression    IMPRESSION: Progressively increased interstitial opacity throughout the right lung, of uncertain chronicity. This may reflect a combination of fibrosis versus acute asymmetric edema or atypical infectious infiltrate.    Chronic blunting of the right costophrenic angle, favoring scar. No left pleural effusion. No pneumothorax.    Upper limits of normal heart size. Atheromatous calcifications of the thoracic aorta. Additional vascular calcifications of the subclavian and axillary arteries.   Occult blood stool   Result  Value Ref Range    Occult Blood Negative Negative   UA with Microscopic reflex to Culture    Specimen: Urine, Clean Catch   Result Value Ref Range    Color Urine Light Yellow Colorless, Straw, Light Yellow, Yellow    Appearance Urine Clear Clear    Glucose Urine Negative Negative mg/dL    Bilirubin Urine Negative Negative    Ketones Urine Negative Negative mg/dL    Specific Gravity Urine 1.015 1.003 - 1.035    Blood Urine Negative Negative    pH Urine 6.0 5.0 - 7.0    Protein Albumin Urine Negative Negative mg/dL    Urobilinogen Urine Normal Normal, 2.0 mg/dL    Nitrite Urine Negative Negative    Leukocyte Esterase Urine Negative Negative    Mucus Urine Present (A) None Seen /LPF    RBC Urine <1 <=2 /HPF    WBC Urine <1 <=5 /HPF    Narrative    Urine Culture not indicated   Chest CT w/o contrast    Narrative    EXAM: CT CHEST W/O CONTRAST  LOCATION: [facility]  DATE/TIME: 7/21/2022 8:30 PM    INDICATION: abnormal x ray, weakness  COMPARISON: 03/10/2022  TECHNIQUE: CT chest without IV contrast. Multiplanar reformats were obtained. Dose reduction techniques were used.  CONTRAST: None.    FINDINGS:   LUNGS AND PLEURA: Increased bilateral groundglass and interstitial airspace opacities superimposed upon background fibrosis with reticulation, interlobular septal thickening and right basilar honeycombing. No pleural effusion. Trace pleural effusions.    MEDIASTINUM/AXILLAE: Borderline and mildly enlarged mediastinal and hilar lymph nodes, likely reactive. Mitral annular calcifications.    CORONARY ARTERY CALCIFICATION: Severe.    UPPER ABDOMEN: Cholelithiasis    MUSCULOSKELETAL: No aggressive or destructive lesion. Unchanged compression deformities and vertebroplasty in the lower thoracic spine.      Impression    IMPRESSION:   1.  New bilateral groundglass opacities most suggestive of atypical multifocal pneumonia.    2.  Background fibrosis and interstitial lung disease.    3.  Tiny bilateral  pleural effusions.     Asymptomatic COVID-19 Virus (Coronavirus) by PCR Nasopharyngeal    Specimen: Nasopharyngeal; Swab   Result Value Ref Range    SARS CoV2 PCR Negative Negative    Narrative    Testing was performed using the Bit[employer] Xpress SARS-CoV-2 Assay on the   Cepheid Gene-Xpert Instrument Systems. Additional information about   this Emergency Use Authorization (EUA) assay can be found via the Lab   Guide. This test should be ordered for the detection of SARS-CoV-2 in   individuals who meet SARS-CoV-2 clinical and/or epidemiological   criteria. Test performance is unknown in asymptomatic patients. This   test is for in vitro diagnostic use under the FDA EUA for   laboratories certified under CLIA to perform high complexity testing.   This test has not been FDA cleared or approved. A negative result   does not rule out the presence of PCR inhibitors in the specimen or   target RNA in concentration below the limit of detection for the   assay. The possibility of a false negative should be considered if   the patient's recent exposure or clinical presentation suggests   COVID-19. This test was validated by the [facility]. This laboratory is certified under the Clinical Laboratory Improvement Amendments of 1988 (CLIA-88) as qualified to perform high complexity laboratory testing.     Strep pneumo Agn Ur greater or equal to 13yrs or CSF any age    Specimen: Urine, Midstream   Result Value Ref Range    Streptococcus pneumoniae antigen Negative Negative   Legionella pneumophila antigen urine    Specimen: Urine, Midstream   Result Value Ref Range    Legionella pneumophila serogroup 1 urinary antigen Negative Negative

## 2022-07-22 NOTE — PLAN OF CARE
Goal Outcome Evaluation:    Plan of Care Reviewed With: patient     Overall Patient Progress: declining    Outcome Evaluation: Patient has a poor appetite and has been unable to maintain PO and weight at home. Will start supplements and promote meals 2-3x/day. Overall patient would benefit from a feeding tube if pt/family agreeable

## 2022-07-22 NOTE — PROGRESS NOTES
[facility]  Patient is currently open to home care services with [facility]. The patient is currently receiving RN and OT services.  and home health team have been notified of patient admission. [facility] liaison will continue to follow patient during stay. If appropriate provide orders to resume home care at time of discharge.

## 2022-07-23 NOTE — PROGRESS NOTES
"CROSS COVER    Paged by and discussed with RN. Paged for \"shaky and shivering, low BP in the 80's.\"  -Recheck of blood pressure shows 129/83  -Per attending patient has chronic low blood pressures at baseline; please see note from today for further details  -Patient with a temp of 100F--received tylenol earlier today and his shaking had improved. Currently being treated for aspiration pneumonia      PLAN:  -Patient asymptomatic with current blood pressure  -Has low grade temperatures and shaking--will repeat lactate   -Earlier lactate normal  -Hemodynamically stable; unclear if low blood pressure was accurate given the reports of shaking, as repeat blood pressure within normal parameters and not symptomatic  -Has generalized weakness in the setting of PNA and severe malnutrition that is currently being medically managed  -Will avoid additional IVF bolus at this time given repeat blood pressure and required lasix today due to hypoxia secondary to volume overload from fluid boluses overnight for his chronic hypotension  -Nursing to page with further questions or concerns  -If hemodynamically unstable, consider RRT or House Officer assessment.    Addendum: 2049  -Lactate still not collected  -Will add blood cultures given fever, tachycardia and hypotension  -Blood pressures in the 70's over 40's   -over 2 liters negative volume status after IV lasix today   -Will give albumin 5%, 25 gms x 1  -Monitor VS  -Currently on Cefepime and doxycycline      Dominique Chanel NP  [facility]  Securely message with the Vocera Web Console (learn more here)  Text page via [employer] Paging/Directory    "

## 2022-07-23 NOTE — PLAN OF CARE
Goal Outcome Evaluation:  A&O x 4. On pureed and thick liquids diet. Assist with meals please. Up with assist of 1 and walker. Coarse lung sounds. Takes pills whole, one at a time.   Still low BPs. Spoke with hospitalist on call. Since patient is  asymptomatic will hold off on additional bolus. Concerned for fluid overload. She said to call if patient becomes symptomatic.

## 2022-07-23 NOTE — PLAN OF CARE
Shaky/shivering, low grade temp and tachy at beginning of the shift, resolved after tylenol and dose of lasix. VSS except low BP in the 80s, asymptomatic. CMS intact. Up with 1 and walker. Incontinent of bowel and bladder. External catheter in place. Poor appetite, pureed diet with nectar thick liquid. Needs help with meals. Speech following. Coarse Lung sounds. Plan to place peg on Monday. A&OX3.

## 2022-07-23 NOTE — PROGRESS NOTES
[facility]    Hospitalist Progress Note    Date of Service (when I saw the patient): 07/23/2022    Assessment & Plan   Efrem Ramos is a 77 year old male who was admitted on 7/21/2022.  Efrem Ramos is a 77 year old male with numerous medical conditions presents to hospital for aspiration pneumonia     Aspiration  pneumonia with history of dysphagia  Severe Malnutrition   Patient presented to hospital with asymptomatic hypotension which resolved prior to arrival. found to have groundglass opacities on CT scan of the chest.  Patient is not septic.  Vital signs are stable.  SARS COVID-negative.  Patient denying any cough, so unable to obtain sputum sample.  Patient is supposed to be on a puréed diet due to his dysphagia however he has been eating solid foods.  He denies any episodes of aspiration.  -doxycycline and ceftriaxone  Switched to ceftriaxone to IV cefepime to cover for aspiration pneumonia   Patient  has penicillin allergy which causes hives  -f/u blood culture  Path consultation requested  Legionella and strep pneumoniae antigens returned negative  Blood cultures remain negative      Severe malnutrition with history of dysphagia;  Patient has not been eating much because of dislike to the puréed diet with his history of dysphagia  We discussed about continuing dysphagia diet with oral supplements versus placing a gastrostomy tube  Patient wants to go with gastrostomy tube placement  Hold Eliquis and Plavix for the procedure  IR consult for G-tube placement  N.p.o. after midnight for the procedure     Hypotension with chronic low blood pressures at baseline in the 80s  Patient with ongoing hypotension prior to admission and also currently blood pressure in the 80s  Patient was given IV fluids since admission and had worsening shortness of breath with hypoxia overnight  Discontinue IV fluids  Start him on Lasix 20 mg IV twice daily for 2 doses for gentle diuresis today  Patient  with good urine output with IV Lasix this morning            Afib/flutter  severe aortic stenosis  HTN  HLD  PVD  CAD   Patient denies any current chest pain.  EKG with A. fib.  -Resume PTA atorvastatin, Eliquis, digoxin, and Plavix  -Follow-up digoxin level  Echo done during this hospitalization showed EF of 60 to 65%.  Severe low-flow low gradient aortic valve stenosis with mean gradient of 23 to 25 mmHg.  Valve area 0.75 cm  severe tricuspid valve regurgitation  Cardiology consultation requested for the severe aortic stenosis and the recommended outpatient follow-up with TAVR clinic       Hx of CVA  -Plavix and statin  Hold Plavix and Eliquis in anticipation of the G-tube placement     MEL  -CPAP     Mild cognitive impairment.   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  Noted.     Pulmonary fibrosis  Not on home O2           Code Status []Expand by Default     Full Code  DVT ppx: Continue PTA Eliquis  Expected length of stay greater than 2 days              Discharge disposition: Expected discharge in the next 2-3days once G-tube placed and tolerating tube feeds    Lizy Dias MD, MD  [phone] (P)      Interval History      Patient is resting comfortably in bed.  Complains of worsening shortness of breath overnight with fluid overload.  Started on Lasix today and shortness of breath much improved and feels a lot better currently.  Patient wants to have feeding tube placed.  IR consult requested for the G-tube placement.  Patient blood pressure chronically runs low in the 80s    -Data reviewed today: I reviewed all new labs and imaging results over the last 24 hours. I personally reviewed all the imaging since admission     Physical Exam   Temp: 100  F (37.8  C) Temp src: Oral BP: (!) 86/54 Pulse: 105   Resp: 18 SpO2: 95 % O2 Device: Nasal cannula Oxygen Delivery: 2 LPM  Vitals:    07/21/22 1649 07/22/22 0603 07/23/22 0429    Weight: 53.1 kg (117 lb) 54.5 kg (120 lb 2.4 oz) 54.7 kg (120 lb 9.5 oz)     Vital Signs with Ranges  Temp:  [97.6  F (36.4  C)-100  F (37.8  C)] 100  F (37.8  C)  Pulse:  [] 105  Resp:  [16-19] 18  BP: ()/(53-90) 86/54  SpO2:  [89 %-98 %] 95 %  I/O last 3 completed shifts:  In: 730 [P.O.:730]  Out: 975 [Urine:975]    Constitutional: Awake, alert, cooperative, no apparent distress  Respiratory: Clear to auscultation bilaterally, no crackles or wheezing  Cardiovascular: Regular rate and rhythm, normal S1 and S2, and 2+ murmur noted  GI: Normal bowel sounds, soft, non-distended, non-tender  Skin/Integumen: No rashes, no cyanosis, no edema  Other:     Medications     dextrose       - MEDICATION INSTRUCTIONS -         atorvastatin  40 mg Oral QPM     ceFEPIme (MAXIPIME) IV  1 g Intravenous Q12H     clopidogrel  75 mg Oral QPM     digoxin  125 mcg Oral Daily     doxycycline hyclate  100 mg Oral Q12H PAT ()     furosemide  20 mg Intravenous Once     gabapentin  300 mg Oral QAM     gabapentin  600 mg Oral At Bedtime     mirtazapine  15 mg Oral At Bedtime     sodium chloride (PF)  3 mL Intracatheter Q8H       Data   Recent Labs   Lab 22  0829 22  1717   WBC 13.2* 10.0   HGB 10.0* 10.4*   MCV 97 96    364    135   POTASSIUM 3.9 3.7   CHLORIDE 113* 102   CO2 24 28   BUN 11 18   CR 0.66 0.93   ANIONGAP 4 5   ULISSES 7.6* 8.5   GLC 89 100*   ALBUMIN  --  2.4*   PROTTOTAL  --  7.7   BILITOTAL  --  0.5   ALKPHOS  --  83   ALT  --  12   AST  --  23       Recent Results (from the past 24 hour(s))   Echocardiogram Complete   Result Value    LVEF  60-65%    Narrative    [id number]  [id number]  [id number]  [id number]^INESSA^[facility]  Echocardiography Laboratory  [address] [place] [id number]     Name: PHANI AVITIAN: [id number]  : 1945  Study Date: 2022 03:05 PM  Age: 77 yrs  Gender: Male  Patient Location: [facility]  Reason For Study:  Dizziness  Ordering Physician: ISABEL WYLIE  Performed By: Ally Hayden RDCS     BSA: 1.6 m2  Height: 67 in  Weight: 120 lb  HR: 90  BP: 78/55 mmHg  ______________________________________________________________________________  Procedure  Complete Echo Adult.  ______________________________________________________________________________  Interpretation Summary     1. Normal left ventricular systolic function. Estimated LVEF 60-70%. No  regional wall motion abnormalities  2. Moderately dilated right ventricle with normal systolic function.  3. Severe, low-flow low gradient aortic valve stenosis with mean gradient 23-  25 mmHg, valve area 0.75 cmÂ .  3. Severe tricuspid valve regurgitation.  4. Mild mitral stenosis and mild regurgitation.  5. Severe bi-atrial enlargement.  Compared to previous study dated, No significant change.  ______________________________________________________________________________  Left Ventricle  The left ventricle is normal in size. There is normal left ventricular wall  thickness. Left ventricular systolic function is normal. The visual ejection  fraction is 60-65%. Diastolic function not assessed due to significant mitral  annular calcification. No regional wall motion abnormalities noted. Septal  motion is consistent with post-operative state.     Right Ventricle  The right ventricle is moderately dilated. The right ventricular systolic  function is normal.     Atria  There is severe biatrial enlargement.     Mitral Valve  There is severe mitral annular calcification. Calcified mitral apparatus.  There is mild to moderate (1-2+) mitral regurgitation. There is moderate  mitral stenosis. Mean diastolic gradient not obtained.     Tricuspid Valve  There is severe (4+) tricuspid regurgitation. The right ventricular systolic  pressure is approximated at 32.4 mmHg plus the right atrial pressure.     Aortic Valve  There is mild (1+) aortic regurgitation. Severe valvular aortic stenosis.  The  mean AoV pressure gradient is 23.7 mmHg. The calculated aortic valve are is  0.75 cm^2.     Pulmonic Valve  The pulmonic valve is not well seen, but is grossly normal. This degree of  valvular regurgitation is within normal limits.     Vessels  The aortic root is normal size. Inferior vena cava not well visualized for  estimation of right atrial pressure.     Pericardium  There is no pericardial effusion.     Rhythm  The rhythm was atrial fibrillation.  ______________________________________________________________________________  MMode/2D Measurements & Calculations     IVSd: 0.93 cm  LVIDd: 3.6 cm  LVIDs: 2.2 cm  LVPWd: 0.89 cm  FS: 39.5 %  LV mass(C)d: 94.6 grams  LV mass(C)dI: 58.1 grams/m2  Ao root diam: 3.4 cm  LA dimension: 5.2 cm  LA/Ao: 1.5  LVOT diam: 2.1 cm  LVOT area: 3.6 cm2  RWT: 0.49     Doppler Measurements & Calculations  MV E max lex: 119.0 cm/sec  MV max P.7 mmHg  MV mean P.6 mmHg  MV V2 VTI: 28.2 cm  MVA(VTI): 1.7 cm2  MV dec slope: 731.9 cm/sec2  Ao V2 max: 328.5 cm/sec  Ao max P.0 mmHg  Ao V2 mean: 222.4 cm/sec  Ao mean P.7 mmHg  Ao V2 VTI: 64.2 cm  SEBASTIAN(I,D): 0.75 cm2  SEBASTIAN(V,D): 0.65 cm2  LV V1 max P.4 mmHg  LV V1 max: 59.1 cm/sec  LV V1 VTI: 13.3 cm  SV(LVOT): 47.9 ml  SI(LVOT): 29.4 ml/m2  PA acc time: 0.06 sec     TR max lex: 284.4 cm/sec  TR max P.4 mmHg  AV Lex Ratio (DI): 0.18  SEBASTIAN Index (cm2/m2): 0.46     ______________________________________________________________________________  Report approved by: Funmilayo Jay 2022 05:00 PM

## 2022-07-23 NOTE — CONSULTS
CLINICAL NUTRITION SERVICES - BRIEF NOTE  See full assessment note completed on 7/22     Consult received for RD to order TF per MNT guidelines     Chart reviewed --  Noted order for IR G-tube placement today    Labs reviewed - K+ 3.9 (WNL), BG 89  Meds reviewed     ASSESSED NUTRITION NEEDS PER APPROVED PRACTICE GUIDELINES:  Dosing Weight 54.5 kg   Estimated Energy Needs: 6280-2082+ kcals (30-35+ Kcal/Kg)  Justification: repletion and underweight  Estimated Protein Needs: 65-82+ grams protein (1.2-1.5+ g pro/Kg)  Justification: Repletion  Estimated Fluid Needs: 1 mL/kcal or per MD     Interventions:  - Once GT placed and ready to use, initiate Jevity 1.5 at 15 mL/hr and increase by 15 mL q 12 hours  - FWF 60 mL q 4 hours for tube patency   - Mg and Phos lab add on for baseline labs  - Do not start or advance TF unless K+ > 3, Mg > 1.5 and Phos > 1.9   - Will monitor PO intake & need for TF increase vs decrease (plan to feed on low end of nutritional needs to start). Can also consider transition to cycled or bolus TF regimen once tolerating.     Jevity 1.5 Hunter @ goal of  45ml/hr  (1080ml/day)  will provide: 1620 kcals, 68 g PRO, 820 ml free H20, 232 g CHO, and 22 g fiber daily. Meets 100% DRIs.     We will continue to follow and monitor.     Tara Sauceda RD, LD  Weekend RD Pager: [phone]

## 2022-07-23 NOTE — PROGRESS NOTES
Cross Cover Note    BP 82/53 MAP 67.5.  Patient asymptomatic. Blood pressure appears to be chronically low.  Noted to have low flow severe aortic stenosis on echo with normal ejection fraction. Cardiology note reviewed. Given IV fluids today with little change. Will hold on IV albumin this evening as course lung sounds (no hypoxemia). Advised nurse to call if asymptomatic or MAP <60.    Fadumo Mendoza PA-C

## 2022-07-24 NOTE — PROGRESS NOTES
Patient is A&O x3. VS with Low BP in 80s and tachycardic. Albumin given as order. Incontinent with large loose stool x1. External catheter in place. CMS intact. Denied pain or SOB. Needs encouragement and feeding help with meals.

## 2022-07-24 NOTE — PROVIDER NOTIFICATION
"MD Notification    Notified Person: MD    Notified Person Name: Dr. Dias    Notification Date/Time: 0800 on 7/24/22    Notification Interaction: Web-based paging    Purpose of Notification: \"Patient's BP still low; R arm BP 76/49, rechecked again w/ R arm BP 84/52, checked again on L arm BP 84/52. K+ 2.7, Mag 1.3, Phos 2.3; will need nurse managed electrolyte replacements ordered if desired. Please advise. *[id number]\"    Orders Received: Nurse managed electrolyte replacements ordered    Comments: Writer paged provider to clarify to administer or hold digoxin dose scheduled for this a.m.  V.O. from Dr. Dias \"hold digoxin today\".   "

## 2022-07-24 NOTE — PROGRESS NOTES
Hospitalist Progress Note    Date of Service (when I saw the patient): 07/24/2022    Assessment & Plan   Efrem Ramos is a 77 year old male who was admitted on 7/21/2022.  Efrem Ramos is a 77 year old male with numerous medical conditions presents to hospital for aspiration pneumonia     Aspiration  pneumonia with history of dysphagia  Severe Malnutrition   Patient presented to hospital with asymptomatic hypotension which resolved prior to arrival. found to have groundglass opacities on CT scan of the chest.  Patient is not septic.  Vital signs are stable.  SARS COVID-negative.  Patient denying any cough, so unable to obtain sputum sample.  Patient is supposed to be on a puréed diet due to his dysphagia however he has been eating solid foods.  He denies any episodes of aspiration.  -doxycycline and ceftriaxone  Switched to ceftriaxone to IV cefepime to cover for aspiration pneumonia   Patient  has penicillin allergy which causes hives  -f/u blood culture  Path consultation requested  Legionella and strep pneumoniae antigens returned negative  Blood cultures remain negative  Switch cefepime to oral Ceftin today      Severe malnutrition with history of dysphagia;  Patient has not been eating much because of dislike to the puréed diet with his history of dysphagia  We discussed about continuing dysphagia diet with oral supplements versus placing a gastrostomy tube  Patient wants to go with gastrostomy tube placement  Hold Eliquis and Plavix for the procedure  IR consult for G-tube placement  N.p.o. after midnight for the procedure tomorrow     Hypotension with chronic low blood pressures at baseline in the 80s  Clinic visits patient's blood pressure is 80 over 50s  Patient with ongoing hypotension prior to admission and also currently blood pressure in the 80s  Patient was given IV fluids since admission and had worsening shortness of breath with hypoxia overnight  yesterday  Discontinued  IV fluids  Start him on Lasix 20 mg IV twice daily for 2 doses for gentle diuresis today  Patient with good urine output with IV Lasix yesterday  Euvolemic today no further diuresis    After diuresis patient's blood pressure was systolics in the 70s to 80s to 1 dose of IV albumin given    Blood pressure stable with systolics in the high 80s currently       Afib/flutter  severe aortic stenosis  HTN  HLD  PVD  CAD   Patient denies any current chest pain.  EKG with A. fib.  -Resume PTA atorvastatin, Eliquis, digoxin, and Plavix  -Follow-up digoxin level  Echo done during this hospitalization showed EF of 60 to 65%.  Severe low-flow low gradient aortic valve stenosis with mean gradient of 23 to 25 mmHg.  Valve area 0.75 cm  severe tricuspid valve regurgitation  Cardiology consultation requested for the severe aortic stenosis and the recommended outpatient follow-up with TAVR clinic       Hx of CVA  -Plavix and statin  Hold Plavix and Eliquis in anticipation of the G-tube placement    Hypokalemia;  Hypomagnesemia and hypophosphatemia  Started on replacement protocols  Patient is at risk of refeeding syndrome after PEG tube placement while on tube feeds monitor electrolytes closely  Hypokalemia secondary to diuresis  Started on replacement protocol     MEL  -CPAP     Mild cognitive impairment.   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  Noted.     Pulmonary fibrosis  Not on home O2           Code Status []Expand by Default     Full Code  DVT ppx: Continue PTA Eliquis  Expected length of stay greater than 2 days              Discharge disposition: Expected discharge in the next 2-3days once G-tube placed and tolerating tube feeds    Lizy Dias MD, MD  [phone] (P)      Interval History      Patient is resting comfortably in bed.  As of breath from yesterday improved.  Blood pressure continues to be in the 80s  which is baseline for patient.  Most likely PEG tube placement tomorrow as per IR    -Data reviewed today: I reviewed all new labs and imaging results over the last 24 hours. I personally reviewed all the imaging since admission     Physical Exam   Temp: 99.6  F (37.6  C) Temp src: Oral BP: (!) 88/57 Pulse: 80   Resp: 14 SpO2: 94 % O2 Device: Nasal cannula Oxygen Delivery: 2 LPM  Vitals:    07/21/22 1649 07/22/22 0603 07/23/22 0429   Weight: 53.1 kg (117 lb) 54.5 kg (120 lb 2.4 oz) 54.7 kg (120 lb 9.5 oz)     Vital Signs with Ranges  Temp:  [97.7  F (36.5  C)-100.1  F (37.8  C)] 99.6  F (37.6  C)  Pulse:  [] 80  Resp:  [14-20] 14  BP: ()/(49-83) 88/57  SpO2:  [93 %-94 %] 94 %  I/O last 3 completed shifts:  In: 530 [P.O.:530]  Out: 2400 [Urine:2400]    Constitutional: Awake, alert, cooperative, no apparent distress  Respiratory: Clear to auscultation bilaterally, no crackles or wheezing  Cardiovascular: Regular rate and rhythm, normal S1 and S2, and 2+ murmur noted  GI: Normal bowel sounds, soft, non-distended, non-tender  Skin/Integumen: No rashes, no cyanosis, no edema  Other:     Medications     dextrose       - MEDICATION INSTRUCTIONS -         atorvastatin  40 mg Oral QPM     ceFEPIme (MAXIPIME) IV  1 g Intravenous Q12H     digoxin  125 mcg Oral Daily     doxycycline hyclate  100 mg Oral Q12H [facility] (08/20)     gabapentin  300 mg Oral QAM     gabapentin  600 mg Oral At Bedtime     magnesium sulfate  4 g Intravenous Once     mirtazapine  15 mg Oral At Bedtime     potassium & sodium phosphates  1 packet Oral or Feeding Tube Q4H     sodium chloride (PF)  3 mL Intracatheter Q8H       Data   Recent Labs   Lab 07/24/22  0718 07/23/22  0829 07/21/22  1717   WBC  --  13.2* 10.0   HGB  --  10.0* 10.4*   MCV  --  97 96   PLT  --  335 364    141 135   POTASSIUM 2.7* 3.9 3.7   CHLORIDE 111* 113* 102   CO2 27 24 28   BUN 15 11 18   CR 0.78 0.66 0.93   ANIONGAP 4 4 5   ULISSES 7.6* 7.6* 8.5   * 89 100*    ALBUMIN  --   --  2.4*   PROTTOTAL  --   --  7.7   BILITOTAL  --   --  0.5   ALKPHOS  --   --  83   ALT  --   --  12   AST  --   --  23       No results found for this or any previous visit (from the past 24 hour(s)).

## 2022-07-24 NOTE — PLAN OF CARE
Pt A&Ox4. Hypotensive. 2L via NC. Infrequent productive cough. LS diminished. Asstx1 w/ GB/walker. Coccyx intact. Incontinent w/ B/B; external catheter, adequate urine OP. Lrg loose BM. NPO. Gtube to be placed tomorrow. K+, phos & Mag replaced. Discharge pending.

## 2022-07-25 NOTE — PLAN OF CARE
Goal Outcome Evaluation:      Pt is A&O x4. A1, GB and walker. External catheter in place. Incontinent of small loose stool x1 this shift. Mepilex in place on sacrum/coccyx. Denies pain. IV infusing sodium phosphate. Replaced potassium and phosphorus this shift. Diet updated to clear liquid diet mildly thick (level 2) (no straws). Discharge pending plan.

## 2022-07-25 NOTE — PROVIDER NOTIFICATION
Paged hospitalist: patient GTube placement on 07/27. Need a diet order please. patient was on pureed diet with thickened liquid.     @ 1345: received order for pureed diet (level 4) mildly thick liquid (level 2).

## 2022-07-25 NOTE — PROGRESS NOTES
Per charge RN, call received from IR that G tube cannot be placed today, patient had plavix on 7/21, needs to wait for five days. G tube placement scheduled for 07/27/2022. Updated hospitalist per IR. No new order.

## 2022-07-25 NOTE — PROGRESS NOTES
Patient is A&O x3. Up with 1, GB and walker. VS with Low BP in 80s. Incontinent with large loose stool x1. External catheter in place. CMS intact. Denied pain or SOB. Moisture related redness and rashes noted to vivek area. Area cleaned barrier paste applied. Patient will be getting Getting G-tube placement today. Will continue to monitor.

## 2022-07-25 NOTE — PLAN OF CARE
Date/Time: 7/24/22 1709-5377    Trauma/Ortho/Medical (Choose one): Medical    Diagnosis: Asp PNA  POD#: NA  Mental Status: A&O  Activity/dangle: Assist of 2, refusing out of bed  Diet: NPO  Pain: Slight pain neck and back, declining medication   Bravo/Voiding: External Catheter, incont large liquid stool   Tele/Restraints/Iso:   02/LDA: PIV SL, O2 2L NC  D/C Date: Pending   Other Info: pt to get Gtube placed tomorrow, K, phos, and mag replaced today, temp 101 tylenol given, blanchable redness to coccyx, fragile skin, turn and repo Q2hrs, productive cough with yellow/orange sputum, lungs diminished on the right

## 2022-07-25 NOTE — PROGRESS NOTES
[facility]    Hospitalist Progress Note    Date of Service (when I saw the patient): 07/25/2022    Assessment & Plan   Efrem Ramos is a 77 year old male who was admitted on 7/21/2022 for aspiration pneumonia.     Aspiration  pneumonia with history of dysphagia  Severe Malnutrition   Patient presented to hospital with asymptomatic hypotension which resolved prior to arrival. found to have groundglass opacities on CT scan of the chest.  Patient is not septic.  Vital signs are stable.  SARS COVID-negative.  Patient denying any cough, so unable to obtain sputum sample.  Patient is supposed to be on a puréed diet due to his dysphagia however he has been eating solid foods.  He denies any episodes of aspiration.  -doxycycline and ceftriaxone,Switched to ceftriaxone to IV cefepime to cover for aspiration pneumonia .  Patient has penicillin allergy.  Blood cultures no growth on 7/21.  Legionella and strep pneumoniae antigens returned negative, antibiotics changed to Ceftin on 7/24.  -Current recommendation is to have a G-tube placed, due to him being on Plavix and Eliquis the procedure would happen only on 7/27.      Severe malnutrition with history of dysphagia;  Patient has not been eating much because of dislike to the puréed diet with his history of dysphagia.We discussed about continuing dysphagia diet with oral supplements versus placing a gastrostomy tube.  Patient is currently open to the idea of gastrostomy tube placement, Plavix and Eliquis is on hold for that, this was discussed with patient, IR will do G-tube placement on 7/27.       Hypotension with chronic low blood pressures at baseline in the 80s  Clinic visits patient's blood pressure is 80 over 50s  Patient with ongoing hypotension prior to admission and also currently blood pressure in the 80s  Patient was given IV fluids since admission and had worsening shortness of breath with hypoxia overnight yesterday.Discontinued  IV  fluids.  He received 20 mg of IV Lasix twice daily for 2 doses on 7/24.  After diuresis patient's blood pressure was systolics in the 70s to 80s to 1 dose of IV albumin given.       Afib/flutter  severe aortic stenosis  HTN  HLD  PVD  CAD   Patient denies any current chest pain.  EKG with A. fib.  -Resumed PTA atorvastatin, Eliquis, digoxin, and Plavix, follow on dig level .  Echo done during this hospitalization showed EF of 60 to 65%.  Severe low-flow low gradient aortic valve stenosis with mean gradient of 23 to 25 mmHg.  Valve area 0.75 cm  severe tricuspid valve regurgitation.  Cardiology consultation requested for the severe aortic stenosis and the recommended outpatient follow-up with TAVR clinic       Hx of CVA  -Plavix and statin  Hold Plavix and Eliquis in anticipation of the G-tube placement.    Hypokalemia;  Hypomagnesemia and hypophosphatemia  Started on replacement protocols  Patient is at risk of refeeding syndrome after PEG tube placement while on tube feeds monitor electrolytes closely.  Hypokalemia secondary to diuresis  Started on replacement protocol     MEL  -CPAP     Mild cognitive impairment.   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  Noted.     Pulmonary fibrosis  Not on home O2        Full Code  DVT ppx: Continue PTA Eliquis    Discharge disposition: Expected discharge in the next 2-3days once G-tube placed and tolerating tube feeds    Mere Jimenez MD    Interval History    Patient is resting, discussed about the possibility of G-tube placement on 27th, also discussed about holding Eliquis and Plavix for that.    -Data reviewed today: I reviewed all new labs and imaging results over the last 24 hours. I personally reviewed all the imaging since admission.     Physical Exam   Temp: 97.2  F (36.2  C) Temp src: Axillary BP: 110/65 Pulse: 70   Resp: 16 SpO2: 97 % O2 Device: None (Room air) Oxygen Delivery: 2  LPM  Vitals:    07/22/22 0603 07/23/22 0429 07/25/22 0900   Weight: 54.5 kg (120 lb 2.4 oz) 54.7 kg (120 lb 9.5 oz) 50 kg (110 lb 3.7 oz)     Vital Signs with Ranges  Temp:  [97.2  F (36.2  C)-101  F (38.3  C)] 97.2  F (36.2  C)  Pulse:  [70-90] 70  Resp:  [15-20] 16  BP: ()/(59-65) 110/65  SpO2:  [93 %-97 %] 97 %  I/O last 3 completed shifts:  In: 180 [P.O.:180]  Out: 800 [Urine:800]    Constitutional: Awake, alert, cooperative, no apparent distress  Respiratory: Clear to auscultation bilaterally, no crackles or wheezing  Cardiovascular: Regular rate and rhythm, normal S1 and S2, and 2+ murmur noted  GI: Normal bowel sounds, soft, non-distended, non-tender  Skin/Integumen: No rashes, no cyanosis, no edema  Other:     Medications     dextrose       - MEDICATION INSTRUCTIONS -         atorvastatin  40 mg Oral QPM     cefuroxime  500 mg Oral Q12H [facility] (08/20)     digoxin  125 mcg Oral Daily     doxycycline hyclate  100 mg Oral Q12H [facility] (08/20)     gabapentin  300 mg Oral QAM     gabapentin  600 mg Oral At Bedtime     mirtazapine  15 mg Oral At Bedtime     sodium chloride (PF)  3 mL Intracatheter Q8H     sodium phosphate  9 mmol Intravenous Once       Data   Recent Labs   Lab 07/25/22  0830 07/25/22  0058 07/24/22  1532 07/24/22  0718 07/23/22  0829 07/21/22  1717   WBC 9.2  --   --   --  13.2* 10.0   HGB 8.6*  --   --   --  10.0* 10.4*   MCV 99  --   --   --  97 96     --   --   --  335 364     --   --  142 141 135   POTASSIUM 3.4 3.4 3.4 2.7* 3.9 3.7   CHLORIDE 113*  --   --  111* 113* 102   CO2 26  --   --  27 24 28   BUN 14  --   --  15 11 18   CR 0.79  --   --  0.78 0.66 0.93   ANIONGAP 4  --   --  4 4 5   ULISSES 8.0*  --   --  7.6* 7.6* 8.5   GLC 87  --   --  102* 89 100*   ALBUMIN 2.2*  --   --   --   --  2.4*   PROTTOTAL  --   --   --   --   --  7.7   BILITOTAL  --   --   --   --   --  0.5   ALKPHOS  --   --   --   --   --  83   ALT  --   --   --   --   --  12   AST  --   --   --   --   --  23        No results found for this or any previous visit (from the past 24 hour(s)).

## 2022-07-26 NOTE — PROGRESS NOTES
[facility]    Hospitalist Progress Note    Date of Service (when I saw the patient): 07/26/2022    Assessment & Plan   Efrem Ramos is a 77 year old male who was admitted on 7/21/2022 for aspiration pneumonia.     Aspiration  pneumonia with history of dysphagia  Severe Malnutrition   Patient presented to hospital with asymptomatic hypotension which resolved prior to arrival. found to have groundglass opacities on CT scan of the chest.  Patient is not septic.  Vital signs are stable.  SARS COVID-negative.  Patient denying any cough, so unable to obtain sputum sample.  Patient is supposed to be on a puréed diet due to his dysphagia however he has been eating solid foods.  He denies any episodes of aspiration.  -doxycycline and ceftriaxone,Switched to ceftriaxone to IV cefepime to cover for aspiration pneumonia .  Patient has penicillin allergy.  Blood cultures no growth on 7/21.  Legionella and strep pneumoniae antigens returned negative, antibiotics changed to Ceftin on 7/24.  -Current recommendation is to have a G-tube placed, due to him being on Plavix and Eliquis the procedure would happen only on 7/27.  -npo from midnight       Severe malnutrition with history of dysphagia;  Patient has not been eating much because of dislike to the puréed diet with his history of dysphagia.We discussed about continuing dysphagia diet with oral supplements versus placing a gastrostomy tube.  Patient is currently open to the idea of gastrostomy tube placement, Plavix and Eliquis is on hold for that, this was discussed with patient, IR will do G-tube placement on 7/27.       Hypotension with chronic low blood pressures at baseline in the 80s  Clinic visits patient's blood pressure is 80 over 50s  Patient with ongoing hypotension prior to admission and also currently blood pressure in the 80s  Patient was given IV fluids since admission and had worsening shortness of breath with hypoxia overnight  yesterday.Discontinued  IV fluids.  He received 20 mg of IV Lasix twice daily for 2 doses on 7/24.  After diuresis patient's blood pressure was systolics in the 70s to 80s to 1 dose of IV albumin given.       Afib/flutter  severe aortic stenosis  HTN  HLD  PVD  CAD   Patient denies any current chest pain.  EKG with A. fib.  -Resumed PTA atorvastatin, Eliquis, digoxin, and Plavix, follow on dig level .  Echo done during this hospitalization showed EF of 60 to 65%.  Severe low-flow low gradient aortic valve stenosis with mean gradient of 23 to 25 mmHg.  Valve area 0.75 cm  severe tricuspid valve regurgitation.  Cardiology consultation requested for the severe aortic stenosis and the recommended outpatient follow-up with TAVR clinic       Hx of CVA  -Plavix and statin  Hold Plavix and Eliquis in anticipation of the G-tube placement.    Hypokalemia;  Hypomagnesemia and hypophosphatemia  Started on replacement protocols  Patient is at risk of refeeding syndrome after PEG tube placement while on tube feeds monitor electrolytes closely.  Hypokalemia secondary to diuresis  Started on replacement protocol     MEL  -CPAP     Mild cognitive impairment.   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  Noted.     Pulmonary fibrosis  Not on home O2        Full Code  DVT ppx: Continue PTA Eliquis    Discharge disposition: Expected discharge in the next 2-3days once G-tube placed and tolerating tube feeds    Mere Jimenez MD    Interval History    Patient is resting, he had a few episodes of loose stools, possibly secondary to antibiotics, plan for G-tube noted tomorrow, n p.o. from midnight.    -Data reviewed today: I reviewed all new labs and imaging results over the last 24 hours. I personally reviewed all the imaging since admission.     Physical Exam   Temp: 97.5  F (36.4  C) Temp src: Oral BP: 91/56 Pulse: 83   Resp: 18 SpO2: 94 % O2 Device: None  (Room air)    Vitals:    07/23/22 0429 07/25/22 0900 07/26/22 1300   Weight: 54.7 kg (120 lb 9.5 oz) 50 kg (110 lb 3.7 oz) 51.9 kg (114 lb 6.7 oz)     Vital Signs with Ranges  Temp:  [97  F (36.1  C)-99.5  F (37.5  C)] 97.5  F (36.4  C)  Pulse:  [] 83  Resp:  [16-20] 18  BP: ()/(54-72) 91/56  SpO2:  [93 %-96 %] 94 %  I/O last 3 completed shifts:  In: 480 [P.O.:480]  Out: 900 [Urine:900]    Constitutional: Awake, alert, cooperative, no apparent distress  Respiratory: Clear to auscultation bilaterally, no crackles or wheezing  Cardiovascular: Regular rate and rhythm, normal S1 and S2, and 2+ murmur noted  GI: Normal bowel sounds, soft, non-distended, non-tender  Skin/Integumen: No rashes, no cyanosis, no edema  Other:     Medications     dextrose       - MEDICATION INSTRUCTIONS -         atorvastatin  40 mg Oral QPM     cefuroxime  500 mg Oral Q12H [facility] (08/20)     digoxin  125 mcg Oral Daily     doxycycline hyclate  100 mg Oral Q12H [facility] (08/20)     gabapentin  300 mg Oral QAM     gabapentin  600 mg Oral At Bedtime     mirtazapine  15 mg Oral At Bedtime     sodium chloride (PF)  3 mL Intracatheter Q8H     sodium phosphate  9 mmol Intravenous Once       Data   Recent Labs   Lab 07/26/22  0805 07/25/22  1434 07/25/22  0830 07/24/22  1532 07/24/22  0718 07/23/22  0829 07/21/22  1717   WBC  --   --  9.2  --   --  13.2* 10.0   HGB  --   --  8.6*  --   --  10.0* 10.4*   MCV  --   --  99  --   --  97 96   PLT  --   --  296  --   --  335 364   NA  --   --  143  --  142 141 135   POTASSIUM 3.1* 3.9 3.4   < > 2.7* 3.9 3.7   CHLORIDE  --   --  113*  --  111* 113* 102   CO2  --   --  26  --  27 24 28   BUN  --   --  14  --  15 11 18   CR  --   --  0.79  --  0.78 0.66 0.93   ANIONGAP  --   --  4  --  4 4 5   ULISSES  --   --  8.0*  --  7.6* 7.6* 8.5   GLC  --   --  87  --  102* 89 100*   ALBUMIN  --   --  2.2*  --   --   --  2.4*   PROTTOTAL  --   --   --   --   --   --  7.7   BILITOTAL  --   --   --   --   --   --  0.5    ALKPHOS  --   --   --   --   --   --  83   ALT  --   --   --   --   --   --  12   AST  --   --   --   --   --   --  23    < > = values in this interval not displayed.       No results found for this or any previous visit (from the past 24 hour(s)).

## 2022-07-26 NOTE — PLAN OF CARE
Goal Outcome Evaluation:    Patient is A&O x4. Stood @ bedside with one assist, tolerated well. Denies chest pain or SOB. On clear liquid thickened. Productive cough. Incontinent of bowel and bladder, external catheter in place. Turn and repo. Potassium and phosphorus replaced, lab recheck tomorrow. Will continue to monitor.

## 2022-07-26 NOTE — PROGRESS NOTES
Date/Time:07/26 ,6728-2301    Trauma/Ortho/Medical (Choose one) :Medical    Diagnosis:Pneumonia    Mental Status:A&O x 4  Activity/dangle:A-1 walker ,GB  Diet:Clear liquid diet with mildly thick liquids  Pain:Tylenol and Oxycodone available  Bravo/Voiding:External catheter  Tele/Restraints/Iso:None  02/LDA:RA  D/C Date:TBD  Other Info:Infrequent productive cough.   Lung sounds coarse and diminished,  sounds congested especially when coughing.   Soft BP 90/65 ,denieslightheaded/dizziness,PO encouraged .PIV patent ,SL  Continue to monitor.

## 2022-07-26 NOTE — PLAN OF CARE
Goal Outcome Evaluation:      Pt is A&O x4. Mildly thick clear liquid diet. IV SL. Potassium replaced x2, recheck again @2130. Phosphorus replaced, recheck tomorrow. Soft BP this shift, MD aware. Mepilex in place on coccyx and back. Incontinent of bowel and bladder; many loose stools this shift. Refused to get oob, turned and repo. G tube to be placed tomorrow, NPO after midnight. Discharge pending.

## 2022-07-26 NOTE — PLAN OF CARE
Goal Outcome Evaluation:    Overall Patient Progress: declining    Outcome Evaluation: No improvement in PO, now with further restricted diet order. Plan for G-tube placement tomorrow and nutrition to start.

## 2022-07-26 NOTE — PROGRESS NOTES
CLINICAL NUTRITION SERVICES - REASSESSMENT NOTE      Recommendations Ordered by Registered Dietitian (RD):   Vital Cuisine canceled with CL diet order   Once GT placed and OK to feed, start TF per current orders and advance as outlined. Increase goal rate to 50 mL/hr to better meet repletion needs.   Malnutrition: (7/22)  % Weight Loss:  > 7.5% in 3 months (severe malnutrition)  % Intake:  </= 50% for >/= 1 month (severe malnutrition)  Subcutaneous Fat Loss:  Orbital region severe depletion, Upper arm region severe depletion and Thoracic region severe depletion  Muscle Loss:  Temporal region severe depletion, Clavicle bone region severe depletion, Scapular bone region severe depletion and Dorsal hand region severe depletion (did not observe LEs today)  Fluid Retention:  None noted     Malnutrition Diagnosis: Severe malnutrition  In Context of:  Chronic illness or disease       EVALUATION OF PROGRESS TOWARD GOALS   Diet:  Clear Liquid, mildly thick     Nutrition Support:  TF ordered entered 7/23 as outlined below. Plan to place feeding tube 7/27.   Jevity 1.5 Hunter @ goal of 45ml/hr (1080ml/day) will provide: 1620 kcals, 68 g PRO, 820 ml free H20, 232 g CHO, and 22 g fiber daily. Meets 100% DRIs.     Intake/Tolerance:    Day 6 limited PO intake since admission     Labs reviewed: K 3.1 (L), Mg 2.3 (NL), Phos 2.1 (L)   Recent Labs   Lab 07/25/22  0830 07/24/22  0718 07/23/22  0829 07/21/22  1717   GLC 87 102* 89 100*     Medications reviewed: Remeron, K/Phos replacements  Stooling:  - 7/23: BM x2  - 7/24: BM x2  - 7/25: BM x2  Wt: 114 lbs 6.7 oz - Weights variable over the past week. Appears down 6 lbs in 6 days (5% body wt) however, had received lasix x2 on 7/24  Vitals:    07/21/22 1649 07/22/22 0603 07/23/22 0429 07/25/22 0900   Weight: 53.1 kg (117 lb) 54.5 kg (120 lb 2.4 oz) 54.7 kg (120 lb 9.5 oz) 50 kg (110 lb 3.7 oz)    07/26/22 1300   Weight: 51.9 kg (114 lb 6.7 oz)       ASSESSED NUTRITION NEEDS:  (updated)  Dosing Weight 52 kg - 7/26  Estimated Energy Needs: 2586-4681 kcals (35-40 Kcal/Kg)  Justification: repletion and underweight  Estimated Protein Needs: 65-94 grams protein (1.2-1.8 g pro/Kg)  Justification: Repletion  Estimated Fluid Needs: 1 mL/kcal or per MD       NEW FINDINGS:   GT planned for tomorrow 7/27    Previous Goals:   Patient will consume at least 50% meals and supplements BID vs nutrition support   Evaluation: Not met  No further wt loss <54 kg   Evaluation: Not met    Previous Nutrition Diagnosis:   Inadequate oral intake related to missing teeth, worsening fragility, dislike of dysphagia diet and poor appetite as evidenced by intake of 1 small meal/day for months up to a year  Evaluation: No change      CURRENT NUTRITION DIAGNOSIS  Inadequate protein-energy intake related to limited oral intake with restricted diet order among poor appetite, now with TF planned as evidenced by day 6 minimal PO this admission and intake of 1 small meal/day for months to a year PTA    INTERVENTIONS  Recommendations / Nutrition Prescription  Vital Cuisine canceled with CL diet order   Once GT placed and OK to feed, start TF per current orders and advance as outlined. Increase goal rate to 50 mL/hr to better meet repletion needs.    Jevity 1.5 Hunter @ goal of 50ml/hr (1200ml/day) will provide: 1800 kcals (35 kcal/kg), 76 g PRO (1.5 g/kg), 912 ml free H20, 258 g CHO, and 25 g fiber daily.    Implementation  EN Composition: as above     Goals  Patient will tolerance EN start w/o refeeding in the next 48 hrs       MONITORING AND EVALUATION:  Progress towards goals will be monitored and evaluated per protocol and Practice Guidelines      Shoshana Bishop, RD, LD  Clinical Dietitian

## 2022-07-26 NOTE — PROGRESS NOTES
Patient is on IR schedule Wednesday 7/27/22 for a gastrostomy tube placement. I also discussed with son Efrem today and answered his questions.     -Labs WNL for procedure.    -Orders for NPO have been entered.   -Consent was obtained from patient Alfredo after explaining the procedure, risks and benefits and consent is in IR.     Please contact the IR department at [phone] for procedural related questions.     Thanks, Robyn [facility] Interventional Radiology CNP ([phone]) (phone [phone])

## 2022-07-27 NOTE — IR NOTE
Interventional Radiology Intra-procedural Nursing Note    Patient Name: Efrem Ramos  Medical Record Number: [id number]  Today's Date: July 27, 2022    Procedure: Gastrostomy tube placement with IV moderate sedation  Start time: 0831  End time: 0835  Report provided to: Tati ZAYAS  Patient depart time and location: 0850 to Room 2325    Note: Patient entered Interventional Radiology Suite number 2 via cart. Patient awake, alert and orientated. Assisted onto procedural table in supine position. Prepped and draped.  Dr. Concepcion in room. Time out and procedure started. Vital signs stable. Telemetry reading CAF.  Unable to place g-tube safely in IR, procedure ended.      Administered medication totals:  Versed 0.5 mg IVP  Fentanyl 25 mcg IVP    Last dose of sedation administered at 0823.

## 2022-07-27 NOTE — PROGRESS NOTES
[facility]    Medicine Progress Note - Hospitalist Service        Date of Admission:  7/21/2022  5:01 PM    Assessment & Plan:   Efrem Ramos is a 77 year old male who was admitted on 7/21/2022 for aspiration pneumonia.     Aspiration  pneumonia with history of dysphagia  -Patient presented to hospital with asymptomatic hypotension which resolved prior to arrival.   -found to have groundglass opacities on CT scan of the chest.    -SARS COVID-negative.    -Patient denying any cough, so unable to obtain sputum sample.  Patient is supposed to be on a puréed diet due to his dysphagia however he has been eating solid foods.  He denies any episodes of aspiration.  -Initially on IV cefepime, continue cefuroxime and doxycycline for 2 more days to complete a 7-day course.   -Blood cultures no growth on 7/21.    Severe malnutrition with history of dysphagia  -Patient has not been eating much because of dislike to the puréed diet with his history of dysphagia.  -Continue tube feeds for now, as below plan for G-tube placement.  -G-tube was planned for today however IR unable to do the procedure due to technical difficulty.  -General surgery consulted for G-tube placement, tentatively planned for tomorrow  -npo from midnight       Hypotension with chronic low blood pressures at baseline in the 80s  -Clinic visits indicates blood pressure is 80 over 50s  -Blood pressure fairly stable in the 90s at the moment.     Afib/flutter  Severe aortic stenosis  HTN  HLD  PVD  CAD   -Continue PTA atorvastatin, digoxin.  -Echo done during this hospitalization showed EF of 60 to 65%.  Severe low-flow low gradient aortic valve stenosis with mean gradient of 23 to 25 mmHg.  Valve area 0.75 cm  severe tricuspid valve regurgitation.  -Cardiology consultation requested for the severe aortic stenosis and they recommended outpatient follow-up with TAVR clinic  -Eliquis and Plavix on hold for anticipated G-tube placement.      Hx  "of CVA  -hold Plavix and Eliquis in anticipation of the G-tube placement.     Hypokalemia  Hypomagnesemia and hypophosphatemia  -Started on replacement protocols     MEL  -CPAP     Hx of Acute disseminated encephalomyelitis  -Has residual right-sided weakness     MGUS  Follows with oncology.  Noted.     Pulmonary fibrosis  Not on home O2       Diet: Adult Formula Drip Feeding: Continuous Jevity 1.5; Other - Specify in Comment; Goal Rate: 50; mL/hr; Medication - Feeding Tube Flush Frequency: At least 15-30 mL water before and after medication administration and with tube clogging; Amount to Se...  NPO per Anesthesia Guidelines for Procedure/Surgery Except for: Meds     DVT Prophylaxis: DOAC   Bravo Catheter: Not present  Code Status: Full Code     Disposition Plan      Expected Discharge Date: 07/29/2022              Entered: Mj Sosa MD 07/27/2022, 12:16 PM        Clinically Significant Risk Factors Present on Admission                        The patient's care was discussed with the Bedside Nurse and Patient.    Mj Sosa MD  Hospitalist Service  [facility]  Text Page 7AM-6PM  Securely message with the Vocera Web Console (learn more here)  Text page via [employer]Process Paging/Directory    ______________________________________________________________________    Interval History   Patient a little frustrated that G-tube placement could not be done with IR today.  Denies significant dyspnea.  Minimal cough.  Afebrile.    Data reviewed today: I reviewed all medications, new labs and imaging results over the last 24 hours. I personally reviewed no images or EKG's today.    Physical Exam   Vital signs:  Temp: 97.6  F (36.4  C) Temp src: Oral BP: 98/63 Pulse: 96   Resp: 16 SpO2: 95 % O2 Device: None (Room air) Oxygen Delivery: 2 LPM   Weight: 48 kg (105 lb 13.1 oz)  Estimated body mass index is 16.09 kg/m  as calculated from the following:    Height as of 6/13/22: 1.727 m (5' 8\").    " Weight as of this encounter: 48 kg (105 lb 13.1 oz).      Wt Readings from Last 2 Encounters:   07/27/22 48 kg (105 lb 13.1 oz)   06/13/22 54.4 kg (120 lb)       Gen: AAOX3, NAD, thinly built  Resp: Coarse rhonchi bilaterally, normal effort of breathing.  CVS: RRR, no murmur  Abd/GI: Soft, non-tender. BS- normoactive.    Skin: Warm, dry no rashes  MSK:  no pedal edema  Neuro- CN- intact. No focal deficits.        Data   Recent Labs   Lab 07/27/22  0758 07/26/22  2251 07/26/22  1603 07/25/22  1434 07/25/22  0830 07/24/22  1532 07/24/22  0718 07/23/22  0829 07/21/22  1717   WBC  --   --   --   --  9.2  --   --  13.2* 10.0   HGB  --   --   --   --  8.6*  --   --  10.0* 10.4*   MCV  --   --   --   --  99  --   --  97 96   PLT  --   --   --   --  296  --   --  335 364   NA  --   --   --   --  143  --  142 141 135   POTASSIUM 3.5 3.5 3.3*   < > 3.4   < > 2.7* 3.9 3.7   CHLORIDE  --   --   --   --  113*  --  111* 113* 102   CO2  --   --   --   --  26  --  27 24 28   BUN  --   --   --   --  14  --  15 11 18   CR  --   --   --   --  0.79  --  0.78 0.66 0.93   ANIONGAP  --   --   --   --  4  --  4 4 5   ULISSES  --   --   --   --  8.0*  --  7.6* 7.6* 8.5   GLC  --   --   --   --  87  --  102* 89 100*   ALBUMIN  --   --   --   --  2.2*  --   --   --  2.4*   PROTTOTAL  --   --   --   --   --   --   --   --  7.7   BILITOTAL  --   --   --   --   --   --   --   --  0.5   ALKPHOS  --   --   --   --   --   --   --   --  83   ALT  --   --   --   --   --   --   --   --  12   AST  --   --   --   --   --   --   --   --  23    < > = values in this interval not displayed.       Recent Results (from the past 24 hour(s))   IR Gastrostomy Tube Percutaneous Plcmnt    Narrative    [place] [facility]  LOCATION: [facility]  DATE: 7/27/2022    PROCEDURE: UNSUCCESSFUL PERCUTANEOUS GASTROSTOMY PLACEMENT SECONDARY  TO ANATOMY    INTERVENTIONAL RADIOLOGIST: Alan Concepcion MD.    INDICATION: 77-year-old male with feeding difficulties.  Gastrostomy  placement is requested. Reviewing the patient's previous CT imaging,  most recently from 7/21/2022 the colon resides anterior to the  stomach.    CONSENT: The risks, benefits and alternatives of the stated procedure  were discussed with the patient  in detail. All questions were  answered. Informed consent was given to proceed with the procedure.    MODERATE SEDATION: Versed 0.5 mg IV; Fentanyl 25 mcg IV.  Under  physician supervision, Versed and fentanyl were administered for  moderate sedation. Pulse oximetry, heart rate and blood pressure were  continuously monitored by an independent trained observer. The  physician spent 4 minutes of face-to-face sedation time with the  patient.    CONTRAST: None.  ANTIBIOTICS: None.  ADDITIONAL MEDICATIONS: None.    FLUOROSCOPIC TIME: 0.3 minutes.  RADIATION DOSE: Air Kerma: 1 mGy.    COMPLICATIONS: No immediate complications.    STERILE BARRIER TECHNIQUE: Maximum sterile barrier technique was used.  Cutaneous antisepsis was performed at the operative site with  application of 2% chlorhexidine and large sterile drape. Prior to the  procedure, the  and assistant performed hand hygiene and wore  hat, mask, sterile gown, and sterile gloves during the entire  procedure.    PROCEDURE:    An 11 [place] feeding tube was inserted through the naris and  into the stomach. Positioning was confirmed fluoroscopically.    The stomach was insufflated with air. As there was no safe  percutaneous access window to avoid the liver or transverse colon the  procedure was aborted.    FINDINGS:  Images show the transverse colon in front of the stomach with no  percutaneous access window.      Impression    IMPRESSION:    Unsuccessful gastrostomy placement given the patient's anatomy as  detailed above. If enteric feeding is necessary surgical consultation  may be beneficial.    LAURI CROW MD         SYSTEM ID:  [id number]     Medications     dextrose       - MEDICATION  INSTRUCTIONS -         atorvastatin  40 mg Oral or NG Tube QPM     cefuroxime  500 mg Oral or Feeding Tube Q12H [facility] (08/20)     digoxin  125 mcg Oral Daily     doxycycline hyclate  100 mg Oral Q12H [facility] (08/20)     [START ON 7/28/2022] gabapentin  300 mg Oral or Feeding Tube QAM     gabapentin  600 mg Oral or Feeding Tube At Bedtime     mirtazapine  15 mg Oral or Feeding Tube At Bedtime     sodium chloride (PF)  3 mL Intracatheter Q8H

## 2022-07-27 NOTE — CONSULTS
General Surgery Consultation      Efrem Ramos MRN# [id number]   YOB: 1945 Age: 77 year old   Date of Admission: 7/21/2022     Reason for consult: I was asked by Ian to evaluate this patient for gastrostomy tube placement due to failure to thrive.           Assessment and Plan:   Malnutrition, dysphagia, pneumonia secondary to dysphagia.  Inability to place gastrostomy tube percutaneously.    I discussed the situation with the patient and his son.  The risk, benefits, hopeful outcomes, and possible complications were explained detail.  Of particular concern is his respiratory status and his ability to be extubated postoperatively.  They will have a discussion about how to proceed, if desired we will plan on laparoscopic gastrostomy tube placement tomorrow morning.             Chief Complaint:   Failure to thrive/dysphagia, pneumonia, inability to place G-tube percutaneously.    History is obtained from the patient, electronic health record and emergency department physician         History of Present Illness:   This patient is a 77 year old male who presents with community-acquired pneumonia, malnutrition, dysphagia.  Attempt to G-tube placement with IR unsuccessful.              Past Medical History:     Past Medical History:   Diagnosis Date     Atrial fibrillation (H)     amiodarone therapy discontinued due to pulmonary toxicity     Atrial flutter (H)      Benign essential hypertension 11/20/2018     Cancer (H) vocal cord     Carpal tunnel syndrome     abstracted 7/3/02.     Coronary artery disease involving native coronary artery of native heart without angina pectoris 05/08/2020    Cath 5/28/2020: patent stents; Cath 5/18/2020: ISABEL x4 to RCA; Cath 3/2020: 1 vessel disease; Cath 2017: moderate 2 vessel disease     CVA (cerebral infarction) 05/05/2015     Demyelinating disease of central nervous system, unspecified (H)     abstracted 7/3/02. 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     Pneumonia 08/17/2016     PVD (peripheral vascular disease) (H)      Redundant colon     needs CT colonography     Shingles      SKIN DISORDERS NEC 03/15/2005     Sleep apnea      Sleep apnea      SVT (supraventricular tachycardia) (H)              Past Surgical History:     Past Surgical History:   Procedure Laterality Date     BIOPSY  brain 2002     BONE MARROW BIOPSY, BONE SPECIMEN, NEEDLE/TROCAR N/A 6/8/2017    Procedure: BIOPSY BONE MARROW;  UNILATERAL BONE MARROW BIOPSY (CONSCIOUS SEDATION) ;  Surgeon: Jamie Gonzales MD;  Location:  GI     BONE MARROW BIOPSY, BONE SPECIMEN, NEEDLE/TROCAR N/A 2/23/2022    Procedure: BIOPSY, BONE MARROW;  Surgeon: Carmelita Aguilera MD;  Location:  GI     CARDIAC CATHERIZATION  09/05/2017    2V CAD, IFR of RCA 0.95     CV CORONARY ANGIOGRAM N/A 3/23/2020    Procedure: Coronary Angiogram and right heart cath;  Surgeon: Gino Ferrer MD;  Location:  HEART CARDIAC CATH LAB     CV HEART CATHETERIZATION WITH POSSIBLE INTERVENTION N/A 5/28/2020    Procedure: Heart Catheterization with Possible Intervention;  Surgeon: Larry Chawla MD;  Location:  HEART CARDIAC CATH LAB     CV HEART CATHETERIZATION WITH POSSIBLE INTERVENTION N/A 5/18/2020    Procedure: Heart Catheterization with Possible Intervention;   Surgeon: Gaurang Swenson MD;  Location:  HEART CARDIAC CATH LAB     CV INSTANTANEOUS WAVE-FREE RATIO N/A 3/23/2020    Procedure: Instantaneous Wave-Free Ratio;  Surgeon: Gino Ferrer MD;  Location:  HEART CARDIAC CATH LAB     CV PCI ATHERECTOMY ORBITAL N/A 2020    Procedure: Percutaneous Coronary Intervention Atherectomy Rotational;  Surgeon: Gaurang Swenson MD;  Location:  HEART CARDIAC CATH LAB     CV PCI STENT DRUG ELUTING N/A 2020    Procedure: Percutaneous Coronary Intervention Stent Drug Eluting;  Surgeon: Gaurang Swenson MD;  Location:  HEART CARDIAC CATH LAB     CV RIGHT HEART CATH MEASUREMENTS RECORDED N/A 2020    Procedure: Right Heart Cath;  Surgeon: Larry Chawla MD;  Location:  HEART CARDIAC CATH LAB     CV TEMPORARY PACEMAKER INSERTION N/A 2020    Procedure: Temporary Pacemaker Insertion;  Surgeon: Gaurang Swenson MD;  Location:  HEART CARDIAC CATH LAB     ESOPHAGOSCOPY, GASTROSCOPY, DUODENOSCOPY (EGD), COMBINED N/A 2018    Procedure: COMBINED ESOPHAGOSCOPY, GASTROSCOPY, DUODENOSCOPY (EGD), BIOPSY SINGLE OR MULTIPLE;;  Surgeon: Xander Marie MD;  Location:  GI     HEAD & NECK SURGERY       IR GASTROSTOMY TUBE PERCUTANEOUS PLCMNT  2022     ORTHOPEDIC SURGERY      right arm ulna reset after injury     THORACOSCOPIC WEDGE RESECTION LUNG Right 2016    Procedure: THORACOSCOPIC WEDGE RESECTION LUNG;  Surgeon: Abdelrahman Noriega MD;  Location:  OR     [facility] NONSPECIFIC PROCEDURE  as a child    T & A. abstracted 7/3/02.     [facility] NONSPECIFIC PROCEDURE  early    CTR. abstracted 7/3/02.               Social History:     Social History     Tobacco Use     Smoking status: Former Smoker     Packs/day: 1.00     Years: 30.00     Pack years: 30.00     Types: Cigarettes     Quit date: 2013     Years since quittin.0     Smokeless tobacco: Never Used   Substance Use Topics     Alcohol use: Not Currently      Alcohol/week: 42.0 standard drinks     Types: 42 Standard drinks or equivalent per week             Family History:     Family History   Problem Relation Age of Onset     Blood Disease Mother         Anemia     Cardiovascular Father      Cancer - colorectal Maternal Grandfather      Hypertension Brother      Diabetes Sister 5        Juvinile Diabetes passed at 36     Respiratory Other         Lung Cancer             Immunizations:     Immunization History   Administered Date(s) Administered     COVID-19,PF,Moderna 01/25/2022     COVID-19,PF,Pfizer (12+ Yrs) 06/22/2021, 07/21/2021     Influenza (High Dose) 3 valent vaccine 11/25/2013, 11/24/2014, 10/21/2015, 11/01/2016, 10/09/2017, 10/01/2018, 11/21/2019     Influenza (IIV3) PF 12/27/2000     Influenza Vaccine, 6+MO IM (QUADRIVALENT W/PRESERVATIVES) 11/20/2019, 11/21/2019     Influenza, Quad, High Dose, Pf, 65yr+ (Fluzone HD) 10/26/2021     Mantoux Tuberculin Skin Test 06/04/2020, 06/14/2020     Pneumo Conj 13-V (2010&after) 06/28/2015, 06/29/2015     Pneumococcal 23 valent 11/24/2013, 11/25/2013     TDAP Vaccine (Adacel) 12/06/2013     Td (Adult), Adsorbed 07/16/1998     Tdap (Adacel,Boostrix) 12/05/2013, 12/05/2013             Allergies:     Allergies   Allergen Reactions     Sulfa Drugs Difficulty breathing, Swelling and Hives     Adhesive Tape Blisters     Amiodarone Other (See Comments)     Developed pleural effusion     Cephalosporins      Tolerated ceftriaxone      Penicillins Hives     Reaction occurred as a child  Tolerated ceftriaxone in past              Medications:     Medications Prior to Admission   Medication Sig Dispense Refill Last Dose     acetaminophen (TYLENOL 8 HOUR ARTHRITIS PAIN) 650 MG CR tablet Take 650 mg by mouth 2 times daily   7/21/2022 at am     apixaban ANTICOAGULANT (ELIQUIS) 5 MG tablet Take 1 tablet (5 mg) by mouth 2 times daily 180 tablet 3 7/21/2022 at am     atorvastatin (LIPITOR) 40 MG tablet Take 1 tablet (40 mg) by mouth daily  (Patient taking differently: Take 40 mg by mouth every evening) 90 tablet 1 7/20/2022 at pm     calcium carbonate 600 mg-vitamin D 400 units (CALTRATE) 600-400 MG-UNIT per tablet Take 1 tablet by mouth 2 times daily    7/21/2022 at am     clopidogrel (PLAVIX) 75 MG tablet Take 1 tablet (75 mg) by mouth daily (Patient taking differently: Take 75 mg by mouth every evening) 90 tablet 1 7/20/2022 at pm     digoxin (LANOXIN) 125 MCG tablet Take 1 tablet (125 mcg) by mouth daily 90 tablet 3 7/21/2022 at am     gabapentin (NEURONTIN) 300 MG capsule Take 300 mg by mouth every morning   7/21/2022 at am     gabapentin (NEURONTIN) 300 MG capsule Take 2 capsules (600 mg) by mouth At Bedtime 180 capsule 3 7/20/2022 at pm     meclizine (ANTIVERT) 25 MG tablet Take 1 tablet (25 mg) by mouth 2 times daily (Patient taking differently: Take 25 mg by mouth daily as needed)   More than a month     mirtazapine (REMERON) 15 MG tablet Take 1 tablet (15 mg) by mouth At Bedtime 90 tablet 3 7/20/2022 at pm     multivitamin (OCUVITE) TABS Take 1 tablet by mouth daily    More than a month at unable to swallow     senna-docusate (SENOKOT-S/PERICOLACE) 8.6-50 MG tablet Take 2 tablets by mouth 2 times daily as needed for constipation   prn     diclofenac (VOLTAREN) 1 % topical gel Apply 4 g topically 4 times daily as needed for moderate pain (Patient not taking: Reported on 7/21/2022) 150 g 11 Not Taking at Unknown time             Review of Systems:   The 5 point Review of Systems is negative other than noted in the HPI            Physical Exam:   Vitals were reviewed  Temp: 97.6  F (36.4  C) Temp src: Oral BP: 98/63 Pulse: 96   Resp: 16 SpO2: 95 % O2 Device: None (Room air)    Constitutional:   awake, alert, cooperative, mild distress, appears stated age and thin     Eyes:   sclera clear     Lungs:   Mild respiratory distress and good air exchange     Abdomen:   no scars, soft and non-distended     Skin:   Thin, warm and dry          Data:    All laboratory and imaging data in the past 24 hours reviewed

## 2022-07-27 NOTE — PRE-PROCEDURE
GENERAL PRE-PROCEDURE:   Procedure:  Tunneled left chest tube placement with IV moderate sedation   Date/Time:  7/27/2022 8:45 AM    Written consent obtained?: Yes    Risks and benefits: Risks, benefits and alternatives were discussed    Consent given by:  Patient  Patient states understanding of procedure being performed: Yes    Patient's understanding of procedure matches consent: Yes    Procedure consent matches procedure scheduled: Yes    Expected level of sedation:  Moderate  Appropriately NPO:  Yes  ASA Class:  3  Mallampati  :  Grade 2- soft palate, base of uvula, tonsillar pillars, and portion of posterior pharyngeal wall visible  Lungs:  Lungs clear with good breath sounds bilaterally and other (comment)  Lung exam comment:  Decreased in left base   Heart:  Normal heart sounds with tachycardia  History & Physical reviewed:  History and physical reviewed and no updates needed  Statement of review:  I have reviewed the lab findings, diagnostic data, medications, and the plan for sedation

## 2022-07-27 NOTE — PROVIDER NOTIFICATION
Paged hospitalist when to start Tube Feeding, patient is little anxious.     @1230: received call back from hospitalist, Start Tube feeding per order. NPO after midnight for possible surgery tomorrow afternoon.

## 2022-07-27 NOTE — PLAN OF CARE
Goal Outcome Evaluation:    Patient is A&O x4. Incontinent of bowel and bladder, few loose stool this shift. Up with one assist/gb/walker. Frequent oral care provided for comfort. NG tube placed today in IR. Tube feeding started @ 10 mL/hr @ 1300, tolerating well. Order to stop Tube Feeding @ midnight for surgery tomorrow. Will continue to monitor.

## 2022-07-27 NOTE — H&P (VIEW-ONLY)
General Surgery Consultation      Efrem Ramos MRN# [id number]   YOB: 1945 Age: 77 year old   Date of Admission: 7/21/2022     Reason for consult: I was asked by Ian to evaluate this patient for gastrostomy tube placement due to failure to thrive.           Assessment and Plan:   Malnutrition, dysphagia, pneumonia secondary to dysphagia.  Inability to place gastrostomy tube percutaneously.    I discussed the situation with the patient and his son.  The risk, benefits, hopeful outcomes, and possible complications were explained detail.  Of particular concern is his respiratory status and his ability to be extubated postoperatively.  They will have a discussion about how to proceed, if desired we will plan on laparoscopic gastrostomy tube placement tomorrow morning.             Chief Complaint:   Failure to thrive/dysphagia, pneumonia, inability to place G-tube percutaneously.    History is obtained from the patient, electronic health record and emergency department physician         History of Present Illness:   This patient is a 77 year old male who presents with community-acquired pneumonia, malnutrition, dysphagia.  Attempt to G-tube placement with IR unsuccessful.              Past Medical History:     Past Medical History:   Diagnosis Date     Atrial fibrillation (H)     amiodarone therapy discontinued due to pulmonary toxicity     Atrial flutter (H)      Benign essential hypertension 11/20/2018     Cancer (H) vocal cord     Carpal tunnel syndrome     abstracted 7/3/02.     Coronary artery disease involving native coronary artery of native heart without angina pectoris 05/08/2020    Cath 5/28/2020: patent stents; Cath 5/18/2020: ISABEL x4 to RCA; Cath 3/2020: 1 vessel disease; Cath 2017: moderate 2 vessel disease     CVA (cerebral infarction) 05/05/2015     Demyelinating disease of central nervous system, unspecified (H)     abstracted 7/3/02. 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     Pneumonia 08/17/2016     PVD (peripheral vascular disease) (H)      Redundant colon     needs CT colonography     Shingles      SKIN DISORDERS NEC 03/15/2005     Sleep apnea      Sleep apnea      SVT (supraventricular tachycardia) (H)              Past Surgical History:     Past Surgical History:   Procedure Laterality Date     BIOPSY  brain 2002     BONE MARROW BIOPSY, BONE SPECIMEN, NEEDLE/TROCAR N/A 6/8/2017    Procedure: BIOPSY BONE MARROW;  UNILATERAL BONE MARROW BIOPSY (CONSCIOUS SEDATION) ;  Surgeon: Jamie Gonzales MD;  Location:  GI     BONE MARROW BIOPSY, BONE SPECIMEN, NEEDLE/TROCAR N/A 2/23/2022    Procedure: BIOPSY, BONE MARROW;  Surgeon: Carmelita Aguilera MD;  Location:  GI     CARDIAC CATHERIZATION  09/05/2017    2V CAD, IFR of RCA 0.95     CV CORONARY ANGIOGRAM N/A 3/23/2020    Procedure: Coronary Angiogram and right heart cath;  Surgeon: Gino Ferrer MD;  Location:  HEART CARDIAC CATH LAB     CV HEART CATHETERIZATION WITH POSSIBLE INTERVENTION N/A 5/28/2020    Procedure: Heart Catheterization with Possible Intervention;  Surgeon: Larry Chawla MD;  Location:  HEART CARDIAC CATH LAB     CV HEART CATHETERIZATION WITH POSSIBLE INTERVENTION N/A 5/18/2020    Procedure: Heart Catheterization with Possible Intervention;   Surgeon: Gaurang Swenson MD;  Location:  HEART CARDIAC CATH LAB     CV INSTANTANEOUS WAVE-FREE RATIO N/A 3/23/2020    Procedure: Instantaneous Wave-Free Ratio;  Surgeon: Gino Ferrer MD;  Location:  HEART CARDIAC CATH LAB     CV PCI ATHERECTOMY ORBITAL N/A 2020    Procedure: Percutaneous Coronary Intervention Atherectomy Rotational;  Surgeon: Gaurang Swenson MD;  Location:  HEART CARDIAC CATH LAB     CV PCI STENT DRUG ELUTING N/A 2020    Procedure: Percutaneous Coronary Intervention Stent Drug Eluting;  Surgeon: Gaurang Swenson MD;  Location:  HEART CARDIAC CATH LAB     CV RIGHT HEART CATH MEASUREMENTS RECORDED N/A 2020    Procedure: Right Heart Cath;  Surgeon: Larry Chawla MD;  Location:  HEART CARDIAC CATH LAB     CV TEMPORARY PACEMAKER INSERTION N/A 2020    Procedure: Temporary Pacemaker Insertion;  Surgeon: Gaurang Swenson MD;  Location:  HEART CARDIAC CATH LAB     ESOPHAGOSCOPY, GASTROSCOPY, DUODENOSCOPY (EGD), COMBINED N/A 2018    Procedure: COMBINED ESOPHAGOSCOPY, GASTROSCOPY, DUODENOSCOPY (EGD), BIOPSY SINGLE OR MULTIPLE;;  Surgeon: Xander Marie MD;  Location:  GI     HEAD & NECK SURGERY       IR GASTROSTOMY TUBE PERCUTANEOUS PLCMNT  2022     ORTHOPEDIC SURGERY      right arm ulna reset after injury     THORACOSCOPIC WEDGE RESECTION LUNG Right 2016    Procedure: THORACOSCOPIC WEDGE RESECTION LUNG;  Surgeon: Abdelrahman Noriega MD;  Location:  OR     [facility] NONSPECIFIC PROCEDURE  as a child    T & A. abstracted 7/3/02.     [facility] NONSPECIFIC PROCEDURE  early    CTR. abstracted 7/3/02.               Social History:     Social History     Tobacco Use     Smoking status: Former Smoker     Packs/day: 1.00     Years: 30.00     Pack years: 30.00     Types: Cigarettes     Quit date: 2013     Years since quittin.0     Smokeless tobacco: Never Used   Substance Use Topics     Alcohol use: Not Currently      Alcohol/week: 42.0 standard drinks     Types: 42 Standard drinks or equivalent per week             Family History:     Family History   Problem Relation Age of Onset     Blood Disease Mother         Anemia     Cardiovascular Father      Cancer - colorectal Maternal Grandfather      Hypertension Brother      Diabetes Sister 5        Juvinile Diabetes passed at 36     Respiratory Other         Lung Cancer             Immunizations:     Immunization History   Administered Date(s) Administered     COVID-19,PF,Moderna 01/25/2022     COVID-19,PF,Pfizer (12+ Yrs) 06/22/2021, 07/21/2021     Influenza (High Dose) 3 valent vaccine 11/25/2013, 11/24/2014, 10/21/2015, 11/01/2016, 10/09/2017, 10/01/2018, 11/21/2019     Influenza (IIV3) PF 12/27/2000     Influenza Vaccine, 6+MO IM (QUADRIVALENT W/PRESERVATIVES) 11/20/2019, 11/21/2019     Influenza, Quad, High Dose, Pf, 65yr+ (Fluzone HD) 10/26/2021     Mantoux Tuberculin Skin Test 06/04/2020, 06/14/2020     Pneumo Conj 13-V (2010&after) 06/28/2015, 06/29/2015     Pneumococcal 23 valent 11/24/2013, 11/25/2013     TDAP Vaccine (Adacel) 12/06/2013     Td (Adult), Adsorbed 07/16/1998     Tdap (Adacel,Boostrix) 12/05/2013, 12/05/2013             Allergies:     Allergies   Allergen Reactions     Sulfa Drugs Difficulty breathing, Swelling and Hives     Adhesive Tape Blisters     Amiodarone Other (See Comments)     Developed pleural effusion     Cephalosporins      Tolerated ceftriaxone      Penicillins Hives     Reaction occurred as a child  Tolerated ceftriaxone in past              Medications:     Medications Prior to Admission   Medication Sig Dispense Refill Last Dose     acetaminophen (TYLENOL 8 HOUR ARTHRITIS PAIN) 650 MG CR tablet Take 650 mg by mouth 2 times daily   7/21/2022 at am     apixaban ANTICOAGULANT (ELIQUIS) 5 MG tablet Take 1 tablet (5 mg) by mouth 2 times daily 180 tablet 3 7/21/2022 at am     atorvastatin (LIPITOR) 40 MG tablet Take 1 tablet (40 mg) by mouth daily  (Patient taking differently: Take 40 mg by mouth every evening) 90 tablet 1 7/20/2022 at pm     calcium carbonate 600 mg-vitamin D 400 units (CALTRATE) 600-400 MG-UNIT per tablet Take 1 tablet by mouth 2 times daily    7/21/2022 at am     clopidogrel (PLAVIX) 75 MG tablet Take 1 tablet (75 mg) by mouth daily (Patient taking differently: Take 75 mg by mouth every evening) 90 tablet 1 7/20/2022 at pm     digoxin (LANOXIN) 125 MCG tablet Take 1 tablet (125 mcg) by mouth daily 90 tablet 3 7/21/2022 at am     gabapentin (NEURONTIN) 300 MG capsule Take 300 mg by mouth every morning   7/21/2022 at am     gabapentin (NEURONTIN) 300 MG capsule Take 2 capsules (600 mg) by mouth At Bedtime 180 capsule 3 7/20/2022 at pm     meclizine (ANTIVERT) 25 MG tablet Take 1 tablet (25 mg) by mouth 2 times daily (Patient taking differently: Take 25 mg by mouth daily as needed)   More than a month     mirtazapine (REMERON) 15 MG tablet Take 1 tablet (15 mg) by mouth At Bedtime 90 tablet 3 7/20/2022 at pm     multivitamin (OCUVITE) TABS Take 1 tablet by mouth daily    More than a month at unable to swallow     senna-docusate (SENOKOT-S/PERICOLACE) 8.6-50 MG tablet Take 2 tablets by mouth 2 times daily as needed for constipation   prn     diclofenac (VOLTAREN) 1 % topical gel Apply 4 g topically 4 times daily as needed for moderate pain (Patient not taking: Reported on 7/21/2022) 150 g 11 Not Taking at Unknown time             Review of Systems:   The 5 point Review of Systems is negative other than noted in the HPI            Physical Exam:   Vitals were reviewed  Temp: 97.6  F (36.4  C) Temp src: Oral BP: 98/63 Pulse: 96   Resp: 16 SpO2: 95 % O2 Device: None (Room air)    Constitutional:   awake, alert, cooperative, mild distress, appears stated age and thin     Eyes:   sclera clear     Lungs:   Mild respiratory distress and good air exchange     Abdomen:   no scars, soft and non-distended     Skin:   Thin, warm and dry          Data:    All laboratory and imaging data in the past 24 hours reviewed

## 2022-07-27 NOTE — PRE-PROCEDURE
GENERAL PRE-PROCEDURE:   Procedure:  Gastrostomy tube placement with IV moderate sedation   Date/Time:  7/27/2022 7:42 AM    Written consent obtained?: Yes    Risks and benefits: Risks, benefits and alternatives were discussed    Consent given by:  Patient  Patient states understanding of procedure being performed: Yes    Patient's understanding of procedure matches consent: Yes    Procedure consent matches procedure scheduled: Yes    Expected level of sedation:  Moderate  Appropriately NPO:  Yes  ASA Class:  3  Mallampati  :  Grade 2- soft palate, base of uvula, tonsillar pillars, and portion of posterior pharyngeal wall visible  Lungs:  Other (comment)  Lung exam comment:  Lung clear on left, exp wheezes on right clear with congested cough   Heart:  Normal heart sounds and rate and systolic murmur  History & Physical reviewed:  History and physical reviewed and no updates needed  Statement of review:  I have reviewed the lab findings, diagnostic data, medications, and the plan for sedation

## 2022-07-27 NOTE — PLAN OF CARE
Goal Outcome Evaluation:        Pt. Alert & oriented x 4; forgetful at times. Bilateral lungs diminished. Productive loose cough.  Per prior staff, up with assist of 1, gait belt, walker; pt. Refused activity this shift.  Repositioning provided. Incontinent of bowel and bladder. 2 BM this shift: loose, small. Electrolyte protocol in place, rechecks in AM. Denies pain. Tolerating PO; thickened liquids. NPO at midnight for scheduled G tube placement.

## 2022-07-28 NOTE — ANESTHESIA PROCEDURE NOTES
Airway       Patient location during procedure: OR       Procedure Start/Stop Times: 7/28/2022 10:24 AM  Staff -        CRNA: Daniel Durham APRN CRNA       Performed By: CRNA  Consent for Airway        Urgency: elective  Indications and Patient Condition       Indications for airway management: vivek-procedural       Induction type:intravenous       Mask difficulty assessment: 1 - vent by mask    Final Airway Details       Final airway type: endotracheal airway       Successful airway: ETT - single  Endotracheal Airway Details        ETT size (mm): 7.0       Cuffed: yes       Successful intubation technique: video laryngoscopy       VL Blade Size: Retana 4       Grade View of Cords: 1       Adjucts: stylet       Bite block used: None    Post intubation assessment        Placement verified by: capnometry and equal breath sounds        Number of attempts at approach: 1       Secured with: silk tape       Ease of procedure: easy       Dentition: Intact and Unchanged    Medication(s) Administered   Medication Administration Time: 7/28/2022 10:24 AM

## 2022-07-28 NOTE — ANESTHESIA POSTPROCEDURE EVALUATION
Patient: Efrem Ramos    Procedure: Procedure(s):  LAPAROSCOPIC ASSISTED GASTROSTOMY TUBE PLACEMENT       Anesthesia Type:  General    Note:     Postop Pain Control: Uneventful            Sign Out: Well controlled pain   PONV: No   Neuro/Psych: Uneventful            Sign Out: Acceptable/Baseline neuro status   Airway/Respiratory: Uneventful            Sign Out: Acceptable/Baseline resp. status   CV/Hemodynamics: Uneventful            Sign Out: Acceptable CV status   Other NRE: NONE   DID A NON-ROUTINE EVENT OCCUR? No           Last vitals:  Vitals Value Taken Time   BP 91/63 07/28/22 1240   Temp 37.5  C (99.5  F) 07/28/22 1140   Pulse 98 07/28/22 1243   Resp 20 07/28/22 1243   SpO2 94 % 07/28/22 1243   Vitals shown include unvalidated device data.    Electronically Signed By: Pawel Mann MD  July 28, 2022  2:25 PM

## 2022-07-28 NOTE — BRIEF OP NOTE
[facility]    Brief Operative Note    Pre-operative diagnosis: Pneumonia of right lung due to infectious organism, unspecified part of lung [J18.9]  Generalized weakness [R53.1]  Post-operative diagnosis Same as pre-operative diagnosis    Procedure: Procedure(s):  LAPAROSCOPIC ASSISTED GASTROSTOMY TUBE PLACEMENT  Surgeon: Surgeon(s) and Role:     * Vicente Weber MD - Primary     * Leann Lama PA-C - Assisting  Anesthesia: General   Estimated Blood Loss: 2 mL    Drains:  G-tube placed  Specimens: * No specimens in log *  Findings:   G tube placed without complications. G tube at 6 marking.  Complications: None.  Implants: * No implants in log *

## 2022-07-28 NOTE — ANESTHESIA PROCEDURE NOTES
Arterial Line Procedure Note    Pre-Procedure   Staff -        Anesthesiologist:  Pawel Mann MD       Performed By: anesthesiologist       Location: pre-op       Pre-Anesthestic Checklist: patient identified, IV checked, site marked, risks and benefits discussed, informed consent, monitors and equipment checked, pre-op evaluation and at physician/surgeon's request  Timeout:       Correct Patient: Yes        Correct Procedure: Yes        Correct Site: Yes        Correct Position: Yes   Line Placement:   This line was placed Post Induction  Procedure   Procedure: arterial line       Laterality: right       Insertion Site: radial.  Sterile Prep        All elements of maximal sterile barrier technique followed       Patient Prep/Sterile Barriers: hand hygiene, sterile gloves, hat, mask, draped, sterile gown, draped       Skin prep: Chloraprep  Insertion/Injection        Technique: Seldinger Technique and ultrasound guided (No image obtained for permanent record)        1. Ultrasound was used to evaluate the access site.       2. Artery evaluated via ultrasound for patency/adequacy.       3. Using real-time ultrasound the needle/catheter was observed entering the artery/vein.       5. The visualized structures were anatomically normal.       6. There were no apparent abnormal pathologic findings.       Catheter Type/Size: 20 gauge, 1.75 in/4.5 cm quick cath (integral wire)  Narrative        Tegaderm dressing used.       Complications: None apparent,        Arterial waveform: Yes        IBP within 10% of NIBP: Yes

## 2022-07-28 NOTE — PROVIDER NOTIFICATION
Paged Dr. Weber: [phone], patient is in severe pain, no post op pain meds ordered. Can we have some IV meds, Gtube cannot be use till 1900 per order. Awaiting for call back.

## 2022-07-28 NOTE — PLAN OF CARE
Goal Outcome Evaluation:  Turns well with 1. Unable to boost self up in bed- still too weak. Coarse rhonchi throughout. HOB at 30 degree. Tube feeding continues at 10/hr. Small smear stool, voiding per urinal or is incontinent at times. Awaiting g-tube placement tomorrow.

## 2022-07-28 NOTE — OP NOTE
Preoperative diagnosis: Failure to thrive, dysphagia.   Postoperative diagnosis: Same.   Operative procedure:   1. Laparoscopic assisted placement of gastrostomy tube    Surgeon: Vicente Weber M.D.   Assistant:Leann Lama PA-C, The physicians assistant was medically necessary for their expertise in camera management, suctioning, suturing, and retraction.    Anesthesia: GETA   EBL: Less than 10 mL.   Events:   After induction of general endotracheal anesthesia [facility] abdomen was prepped and draped in the usual sterile fashion. With the direct visualization trocar in the left upper quadrant the abdomen was entered and pneumoperitoneum was established. Two additional trocars were placed.  The transverse colon continue to be quite distended as it was noted on x-ray yesterday.  This was carefully moved out of the way.  Place was chosen along the greater curvature of the stomach in the distal body of it and pursestring suture of 2-0 Vicryl was placed.  An additional 2-0 Vicryl anchoring suture was also placed.  A gastrostomy was made and a 16 Serbian gastrostomy tube advanced into position.  The balloon of the gastrostomy tube inflated.  Tube flushed without difficulty.  We then tied the pursestring suture well in the same time anchoring the stomach to the abdominal wall.  The additional anchoring suture was also tied.  The stomach was comfortable reaching the abdominal wall.  Trocars were removed under direct visualization without evidence of bleeding.  The tube this was secured at 6 cm.  Skin was approximated with absorbable suture. Steri-Strips and dressing applied. No immediate complications. Counts reported correct.

## 2022-07-28 NOTE — PLAN OF CARE
Goal Outcome Evaluation:      Pt is A&O x4. A1, gb & walker. Not oob this shift, turned and repo. Voiding per urinal/ incontinent at times; few loose stools this shift. G tube placed this morning, keep to gravity drainage tonight. Potassium replaced this shift. Recheck @ 2300.

## 2022-07-28 NOTE — PROGRESS NOTES
[facility]      General Surgery  Short Progress Note      Efrem Ramos underwent laparoscopic G tube placement without complications. Will keep G tube to gravity drainage overnight. Okay for meds through G tube in 6 hours. Start trickle tube feeds tomorrow (7:00 7/29) and advance per nutrition. Flush G tube per protocol. May place abdominal binder in place to prevent self-removal. G tube hub secured with suture at 6 marking. Other cares per critical care team.      Leann Lama PA-C

## 2022-07-28 NOTE — PROGRESS NOTES
[facility]    Medicine Progress Note - Hospitalist Service        Date of Admission:  7/21/2022  5:01 PM    Assessment & Plan:   Efrem Ramos is a 77 year old male who was admitted on 7/21/2022 for aspiration pneumonia.     Aspiration  pneumonia with history of dysphagia  -Patient presented to hospital with asymptomatic hypotension which resolved prior to arrival.   -found to have groundglass opacities on CT scan of the chest.    -SARS COVID-negative.    -Patient denying any cough, so unable to obtain sputum sample.  Patient is supposed to be on a puréed diet due to his dysphagia however he has been eating solid foods.  He denies any episodes of aspiration.  -Initially on IV cefepime, continue cefuroxime and doxycycline for 1 more days to complete a 7-day course.   -Blood cultures no growth on 7/21.    Severe malnutrition with history of dysphagia  -Patient has not been eating much because of dislike to the puréed diet with his history of dysphagia.  -Continue tube feeds for now, as below plan for G-tube placement.  -G-tube was planned for today however IR unable to do the procedure due to technical difficulty.  -General surgery consulted for G-tube placement, plan for laparoscopic G-tube placement today      Hypotension with chronic low blood pressures at baseline in the 80s  -Clinic visits indicates blood pressure is 80 over 50s  -Blood pressure fairly stable in the 90s at the moment.  -Largely asymptomatic     Afib/flutter  Severe aortic stenosis  HTN  HLD  PVD  CAD   -Continue PTA atorvastatin, digoxin.  -Echo done during this hospitalization showed EF of 60 to 65%.  Severe low-flow low gradient aortic valve stenosis with mean gradient of 23 to 25 mmHg.  Valve area 0.75 cm  severe tricuspid valve regurgitation.  -Cardiology consultation requested for the severe aortic stenosis and they recommended outpatient follow-up with TAVR clinic  -Eliquis and Plavix on hold for anticipated G-tube  placement.  Will resume after procedure today.      Hx of CVA  -Resume Plavix and Eliquis after G-tube placement today     Hypokalemia  Hypomagnesemia and hypophosphatemia  -Electrolytes replacement protocol     MEL  -CPAP     Hx of Acute disseminated encephalomyelitis  -Has residual right-sided weakness     MGUS  Follows with oncology.  Noted.     Pulmonary fibrosis  Not on home O2    Diarrhea  -Most likely antibiotic and/or tube feed related  -However has had multiple episodes, has been exposed to broad-spectrum antibiotics, will check C. difficile today.       Diet: Adult Formula Drip Feeding: Continuous Jevity 1.5; Other - Specify in Comment; Goal Rate: 50; mL/hr; Medication - Feeding Tube Flush Frequency: At least 15-30 mL water before and after medication administration and with tube clogging; Amount to Se...  NPO per Anesthesia Guidelines for Procedure/Surgery Except for: Meds     DVT Prophylaxis: DOAC   Bravo Catheter: Not present  Code Status: Full Code     Disposition Plan      Expected Discharge Date: 07/29/2022              Entered: Mj Sosa MD 07/28/2022, 9:06 AM        Clinically Significant Risk Factors Present on Admission                        The patient's care was discussed with the Bedside Nurse and Patient.    jM Sosa MD  Hospitalist Service  [facility]  Text Page 7AM-6PM  Securely message with the Vocera Web Console (learn more here)  Text page via [employer] Paging/Directory    ______________________________________________________________________    Interval History   Still having multiple loose stools.  Denies abdominal pain.  Going for G-tube placement today.    Data reviewed today: I reviewed all medications, new labs and imaging results over the last 24 hours. I personally reviewed no images or EKG's today.    Physical Exam   Vital signs:  Temp: 97.8  F (36.6  C) Temp src: Oral BP: 92/57 Pulse: 78   Resp: 16 SpO2: 95 % O2 Device: None (Room air)  "Oxygen Delivery: 2 LPM   Weight: 54.2 kg (119 lb 7.8 oz) (equipment might have been added to bed)  Estimated body mass index is 18.17 kg/m  as calculated from the following:    Height as of 6/13/22: 1.727 m (5' 8\").    Weight as of this encounter: 54.2 kg (119 lb 7.8 oz).      Wt Readings from Last 2 Encounters:   07/28/22 54.2 kg (119 lb 7.8 oz)   06/13/22 54.4 kg (120 lb)       Gen: AAOX3, NAD, thinly built  Resp: Coarse rhonchi bilaterally, normal effort of breathing.  CVS: RRR, no murmur  Abd/GI: Soft, non-tender. BS- normoactive.    Skin: Warm, dry no rashes  MSK:  no pedal edema  Neuro- CN- intact. No focal deficits.        Data   Recent Labs   Lab 07/28/22  0802 07/27/22  0758 07/26/22  2251 07/25/22  1434 07/25/22  0830 07/24/22  1532 07/24/22  0718 07/23/22  0829 07/21/22  1717   WBC  --   --   --   --  9.2  --   --  13.2* 10.0   HGB  --   --   --   --  8.6*  --   --  10.0* 10.4*   MCV  --   --   --   --  99  --   --  97 96   PLT  --   --   --   --  296  --   --  335 364   NA  --   --   --   --  143  --  142 141 135   POTASSIUM 3.0* 3.5 3.5   < > 3.4   < > 2.7* 3.9 3.7   CHLORIDE  --   --   --   --  113*  --  111* 113* 102   CO2  --   --   --   --  26  --  27 24 28   BUN  --   --   --   --  14  --  15 11 18   CR  --   --   --   --  0.79  --  0.78 0.66 0.93   ANIONGAP  --   --   --   --  4  --  4 4 5   ULISSES  --   --   --   --  8.0*  --  7.6* 7.6* 8.5   GLC  --   --   --   --  87  --  102* 89 100*   ALBUMIN  --   --   --   --  2.2*  --   --   --  2.4*   PROTTOTAL  --   --   --   --   --   --   --   --  7.7   BILITOTAL  --   --   --   --   --   --   --   --  0.5   ALKPHOS  --   --   --   --   --   --   --   --  83   ALT  --   --   --   --   --   --   --   --  12   AST  --   --   --   --   --   --   --   --  23    < > = values in this interval not displayed.       No results found for this or any previous visit (from the past 24 hour(s)).  Medications     dextrose       - MEDICATION INSTRUCTIONS -         [Auto " Hold] atorvastatin  40 mg Oral or NG Tube QPM     [Auto Hold] cefuroxime  500 mg Oral or Feeding Tube Q12H [facility] (08/20)     [Auto Hold] digoxin  125 mcg Oral Daily     [Auto Hold] doxycycline hyclate  100 mg Oral Q12H [facility] (08/20)     [Auto Hold] gabapentin  300 mg Oral or Feeding Tube QAM     [Auto Hold] gabapentin  600 mg Oral or Feeding Tube At Bedtime     [Auto Hold] mirtazapine  15 mg Oral or Feeding Tube At Bedtime     [Auto Hold] sodium chloride (PF)  3 mL Intracatheter Q8H

## 2022-07-28 NOTE — PROGRESS NOTES
Date/Time:07/28 ,[id number]    Trauma/Ortho/Medical (Choose one) :Medical    Diagnosis:Pneumonia    Mental Status:A&O x 4  Activity/dangle:A-1 walker ,GB  Diet: NPO @ midnight d/t LAPAROSCOPIC ASSISTED GASTROSTOMY TUBE PLACEMENT Pain:Tylenol and Oxycodone available  Bravo/Voiding:Urinal  Tele/Restraints/Iso:None  02/LDA:RA  D/C Date:TBD  Other Info:Infrequent productive cough.   Lung sounds coarse and diminished,  PIV patent ,SL,soft BP 81/51 ,HR 82

## 2022-07-28 NOTE — ANESTHESIA CARE TRANSFER NOTE
Patient: Efrem Ramos    Procedure: Procedure(s):  LAPAROSCOPIC ASSISTED GASTROSTOMY TUBE PLACEMENT       Diagnosis: Pneumonia of right lung due to infectious organism, unspecified part of lung [J18.9]  Generalized weakness [R53.1]  Diagnosis Additional Information: No value filed.    Anesthesia Type:   General     Note:    Oropharynx: oropharynx clear of all foreign objects  Level of Consciousness: drowsy  Oxygen Supplementation: face mask  Level of Supplemental Oxygen (L/min / FiO2): 6  Independent Airway: airway patency satisfactory and stable  Dentition: dentition unchanged  Vital Signs Stable: post-procedure vital signs reviewed and stable  Report to RN Given: handoff report given  Patient transferred to: PACU    Handoff Report: Identifed the Patient, Identified the Reponsible Provider, Reviewed the pertinent medical history, Discussed the surgical course, Reviewed Intra-OP anesthesia mangement and issues during anesthesia, Set expectations for post-procedure period and Allowed opportunity for questions and acknowledgement of understanding      Vitals:  Vitals Value Taken Time   /87 07/28/22 1140   Temp     Pulse 96 07/28/22 1145   Resp 20 07/28/22 1145   SpO2 99 % 07/28/22 1145   Vitals shown include unvalidated device data.    Electronically Signed By: FABI Metcalf CRNA  July 28, 2022  11:46 AM

## 2022-07-28 NOTE — ANESTHESIA PREPROCEDURE EVALUATION
Anesthesia Pre-Procedure Evaluation    Patient: Efrem Ramos   MRN: [id number] : 1945        Procedure : Procedure(s):  LAPAROSCOPIC ASSISTED GASTROSTOMY TUBE PLACEMENT          Past Medical History:   Diagnosis Date     Atrial fibrillation (H)     amiodarone therapy discontinued due to pulmonary toxicity     Atrial flutter (H)      Benign essential hypertension 2018     Cancer (H) vocal cord     Carpal tunnel syndrome     abstracted 7/3/02.     Coronary artery disease involving native coronary artery of native heart without angina pectoris 2020    Cath 2020: patent stents; Cath 2020: ISABEL x4 to RCA; Cath 3/2020: 1 vessel disease; Cath 2017: moderate 2 vessel disease     CVA (cerebral infarction) 2015     Demyelinating disease of central nervous system, unspecified (H)     abstracted 7/3/02. ADEM - follows in neurology     Dyspnea      ENCEPHALOPATHY UNSPECIFIED  03/15/2005    acute diseminated encephalitis     Femoral artery hematoma complicating cardiac catheterization     2020     History of thrombophlebitis      Mitral valve problem 2013    TRANSTHORACIC ECHOCARDIOGRAM 2013 There is a linear strand like projection seen in the LV cavity in diastole that I suspect is the posterior mitral leaflet although I cannot exclude a torn chordae or small vegetation       Mixed hyperlipidemia 03/15/2005     Nonrheumatic mitral valve stenosis      MEL (obstructive sleep apnea)      Other and unspecified noninfectious gastroenteritis and colitis(558.9)     abstracted 7/3/02.     Pneumonia 2016     PVD (peripheral vascular disease) (H)      Redundant colon     needs CT colonography     Shingles      SKIN DISORDERS NEC 03/15/2005     Sleep apnea      Sleep apnea      SVT (supraventricular tachycardia) (H)       Past Surgical History:   Procedure Laterality Date     BIOPSY  brain      BONE MARROW BIOPSY, BONE SPECIMEN, NEEDLE/TROCAR N/A 2017    Procedure: BIOPSY  BONE MARROW;  UNILATERAL BONE MARROW BIOPSY (CONSCIOUS SEDATION) ;  Surgeon: Jamie Gonzales MD;  Location:  GI     BONE MARROW BIOPSY, BONE SPECIMEN, NEEDLE/TROCAR N/A 2/23/2022    Procedure: BIOPSY, BONE MARROW;  Surgeon: Carmelita Aguilera MD;  Location:  GI     CARDIAC CATHERIZATION  09/05/2017    2V CAD, IFR of RCA 0.95     CV CORONARY ANGIOGRAM N/A 3/23/2020    Procedure: Coronary Angiogram and right heart cath;  Surgeon: Gino Ferrer MD;  Location:  HEART CARDIAC CATH LAB     CV HEART CATHETERIZATION WITH POSSIBLE INTERVENTION N/A 5/28/2020    Procedure: Heart Catheterization with Possible Intervention;  Surgeon: Larry Chawla MD;  Location:  HEART CARDIAC CATH LAB     CV HEART CATHETERIZATION WITH POSSIBLE INTERVENTION N/A 5/18/2020    Procedure: Heart Catheterization with Possible Intervention;  Surgeon: Gaurang Swenson MD;  Location:  HEART CARDIAC CATH LAB     CV INSTANTANEOUS WAVE-FREE RATIO N/A 3/23/2020    Procedure: Instantaneous Wave-Free Ratio;  Surgeon: Gino Ferrer MD;  Location:  HEART CARDIAC CATH LAB     CV PCI ATHERECTOMY ORBITAL N/A 5/18/2020    Procedure: Percutaneous Coronary Intervention Atherectomy Rotational;  Surgeon: Gaurang Swenson MD;  Location:  HEART CARDIAC CATH LAB     CV PCI STENT DRUG ELUTING N/A 5/18/2020    Procedure: Percutaneous Coronary Intervention Stent Drug Eluting;  Surgeon: Gaurang Swenson MD;  Location:  HEART CARDIAC CATH LAB     CV RIGHT HEART CATH MEASUREMENTS RECORDED N/A 5/28/2020    Procedure: Right Heart Cath;  Surgeon: Larry Chawla MD;  Location:  HEART CARDIAC CATH LAB     CV TEMPORARY PACEMAKER INSERTION N/A 5/18/2020    Procedure: Temporary Pacemaker Insertion;  Surgeon: Gaurang Swenson MD;  Location:  HEART CARDIAC CATH LAB     ESOPHAGOSCOPY, GASTROSCOPY, DUODENOSCOPY (EGD), COMBINED N/A 9/8/2018    Procedure: COMBINED ESOPHAGOSCOPY, GASTROSCOPY, DUODENOSCOPY (EGD),  BIOPSY SINGLE OR MULTIPLE;;  Surgeon: Xander Marie MD;  Location:  GI     HEAD & NECK SURGERY       IR GASTROSTOMY TUBE PERCUTANEOUS PLCMNT  2022     ORTHOPEDIC SURGERY      right arm ulna reset after injury     THORACOSCOPIC WEDGE RESECTION LUNG Right 2016    Procedure: THORACOSCOPIC WEDGE RESECTION LUNG;  Surgeon: Abdelrahman Noriega MD;  Location:  OR     [facility] NONSPECIFIC PROCEDURE  as a child    T & A. abstracted 7/3/02.     ZZC NONSPECIFIC PROCEDURE  early    CTR. abstracted 7/3/02.      Allergies   Allergen Reactions     Sulfa Drugs Difficulty breathing, Swelling and Hives     Adhesive Tape Blisters     Amiodarone Other (See Comments)     Developed pleural effusion     Cephalosporins      Tolerated ceftriaxone      Penicillins Hives     Reaction occurred as a child  Tolerated ceftriaxone in past       Social History     Tobacco Use     Smoking status: Former Smoker     Packs/day: 1.00     Years: 30.00     Pack years: 30.00     Types: Cigarettes     Quit date: 2013     Years since quittin.0     Smokeless tobacco: Never Used   Substance Use Topics     Alcohol use: Not Currently     Alcohol/week: 42.0 standard drinks     Types: 42 Standard drinks or equivalent per week      Wt Readings from Last 1 Encounters:   22 54.2 kg (119 lb 7.8 oz)        Anesthesia Evaluation   Pt has had prior anesthetic.     No history of anesthetic complications       ROS/MED HX  ENT/Pulmonary: Comment: S/p VATS resection    (+) sleep apnea, vocal cord abnormalities (Hx of vocal cord cancer s/p laser removal) -  tobacco use, Past use, 30  Pack-Year Hx,  COPD, recent URI (Aspiration PNA), unresolved,     Neurologic: Comment: Demyelinating disease of central nervous system    (+) dementia (mild), CVA, without deficits,     Cardiovascular:     (+) Dyslipidemia hypertension-Peripheral Vascular Disease-CAD --stent-4 Drug Eluting Stent. Taking blood thinners dysrhythmias, a-fib and a-flutter,  valvular problems/murmurs type: AS and MR Severe AS; Mild AI; Mod MS; Mod MR; Severe TR. pulmonary hypertension, Previous cardiac testing   Echo: Date: 7/22/22 Results:  Interpretation Summary  Normal left ventricular systolic function. Estimated LVEF 60-70%. No regional wall motion abnormalities. Moderately dilated right ventricle with normal systolic function. Severe, low-flow low gradient aortic valve stenosis with mean gradient 23- 25 mmHg, valve area 0.75 cmÂ . Severe tricuspid valve regurgitation. Mild mitral stenosis and mild regurgitation. Severe bi-atrial enlargement.      Left Ventricle  The left ventricle is normal in size. There is normal left ventricular wall thickness. Left ventricular systolic function is normal. The visual ejection fraction is 60-65%. Diastolic function not assessed due to significant mitral annular calcification. No regional wall motion abnormalities noted. Septal motion is consistent with post-operative state.    Right Ventricle  The right ventricle is moderately dilated. The right ventricular systolic function is normal.    Atria  There is severe biatrial enlargement.    Mitral Valve  There is severe mitral annular calcification. Calcified mitral apparatus. There is mild to moderate (1-2+) mitral regurgitation. There is moderate mitral stenosis. Mean diastolic gradient not obtained.    Tricuspid Valve  There is severe (4+) tricuspid regurgitation. The right ventricular systolic pressure is approximated at 32.4 mmHg plus the right atrial pressure.    Aortic Valve  There is mild (1+) aortic regurgitation. Severe valvular aortic stenosis. The mean AoV pressure gradient is 23.7 mmHg. The calculated aortic valve are is 0.75 cm^2.    Pulmonic Valve  The pulmonic valve is not well seen, but is grossly normal. This degree of valvular regurgitation is within normal limits.    Vessels  The aortic root is normal size. Inferior vena cava not well visualized for estimation of right atrial  pressure.    Pericardium  There is no pericardial effusion.    Rhythm  The rhythm was atrial fibrillation.  Stress Test: Date: Results:    ECG Reviewed: Date: Results:    Cath: Date: Results:      METS/Exercise Tolerance:     Hematologic:     (+) anemia,     Musculoskeletal:   (+) arthritis,     GI/Hepatic:       Renal/Genitourinary:       Endo:       Psychiatric/Substance Use:     (+) alcohol abuse     Infectious Disease: Comment: C. diff      Malignancy:   (+) Malignancy, History of Other.Other CA Vocal cord status post.    Other:               OUTSIDE LABS:  CBC:   Lab Results   Component Value Date    WBC 9.2 07/25/2022    WBC 13.2 (H) 07/23/2022    HGB 8.6 (L) 07/25/2022    HGB 10.0 (L) 07/23/2022    HCT 27.3 (L) 07/25/2022    HCT 31.4 (L) 07/23/2022     07/25/2022     07/23/2022     BMP:   Lab Results   Component Value Date     07/25/2022     07/24/2022    POTASSIUM 3.5 07/27/2022    POTASSIUM 3.5 07/26/2022    CHLORIDE 113 (H) 07/25/2022    CHLORIDE 111 (H) 07/24/2022    CO2 26 07/25/2022    CO2 27 07/24/2022    BUN 14 07/25/2022    BUN 15 07/24/2022    CR 0.79 07/25/2022    CR 0.78 07/24/2022    GLC 87 07/25/2022     (H) 07/24/2022     COAGS:   Lab Results   Component Value Date    PTT 33 05/18/2020    INR 1.51 (H) 05/18/2020     POC:   Lab Results   Component Value Date    BGM 99 05/19/2020     HEPATIC:   Lab Results   Component Value Date    ALBUMIN 2.2 (L) 07/25/2022    PROTTOTAL 7.7 07/21/2022    ALT 12 07/21/2022    AST 23 07/21/2022    ALKPHOS 83 07/21/2022    BILITOTAL 0.5 07/21/2022     OTHER:   Lab Results   Component Value Date    PH 7.50 (H) 05/28/2020    LACT 1.0 07/27/2022    A1C 5.3 09/04/2015    ULISSES 8.0 (L) 07/25/2022    PHOS 2.5 07/27/2022    MAG 2.3 07/27/2022    LIPASE 294 09/05/2018    TSH 2.58 01/10/2022    T4 0.90 04/06/2016    T3 62 12/18/2015    CRP 36.9 (H) 09/05/2018    SED 17 05/12/2015       Anesthesia Plan    ASA Status:  4   NPO Status:  NPO  Appropriate    Anesthesia Type: General.     - Airway: ETT   Induction: Intravenous.   Maintenance: Balanced.   Techniques and Equipment:     - Airway: Video-Laryngoscope     - Lines/Monitors: Arterial Line     Consents    Anesthesia Plan(s) and associated risks, benefits, and realistic alternatives discussed. Questions answered and patient/representative(s) expressed understanding.    - Discussed:     - Discussed with:  Patient      - Extended Intubation/Ventilatory Support Discussed: Yes.      - Patient is DNR/DNI Status: No    Use of blood products discussed: Yes.     - Discussed with: Patient.     - Consented: consented to blood products            Reason for refusal: other.     Postoperative Care    Pain management: IV analgesics, Oral pain medications, Multi-modal analgesia.   PONV prophylaxis: Ondansetron (or other 5HT-3)     Comments:                Pawel Mann MD

## 2022-07-29 NOTE — PROGRESS NOTES
[facility]    Medicine Progress Note - Hospitalist Service        Date of Admission:  7/21/2022  5:01 PM    Assessment & Plan:   Efrem Ramos is a 77 year old male who was admitted on 7/21/2022 for aspiration pneumonia.     Aspiration  pneumonia with history of dysphagia  -Patient presented to hospital with asymptomatic hypotension which resolved prior to arrival.   -found to have groundglass opacities on CT scan of the chest.    -SARS COVID-negative.    -Patient denying any cough, so unable to obtain sputum sample.  Patient is supposed to be on a puréed diet due to his dysphagia however he has been eating solid foods.  He denies any episodes of aspiration.  -Initially on IV cefepime, discontinue antibiotics after today's dose.  -Blood cultures no growth on 7/21.    Severe malnutrition with history of dysphagia  -Patient has not been eating much because of dislike to the puréed diet with his history of dysphagia.  -Continue tube feeds for now, as below plan for G-tube placement.  -G-tube was planned for today however IR unable to do the procedure due to technical difficulty.  -General surgery consulted underwent G-tube placement on 7/28.  -Continue tube feeding through G-tube      Hypotension with chronic low blood pressures at baseline in the 80s  -Clinic visits indicates blood pressure is 80 over 50s  -Blood pressure fairly stable in the 90s at the moment.  -Largely asymptomatic     Afib/flutter  Severe aortic stenosis  HTN  HLD  PVD  CAD   -Continue PTA atorvastatin, digoxin.  -Noted pharmacy comments regarding digoxin levels.  Recommend rechecking digoxin level as outpatient in a couple of weeks and adjusting accordingly.  For the moment he is therapeutic and marginally above desired range so I would not suggest making any changes.  -Echo done during this hospitalization showed EF of 60 to 65%.  Severe low-flow low gradient aortic valve stenosis with mean gradient of 23 to 25 mmHg.  Valve  area 0.75 cm  severe tricuspid valve regurgitation.  -Cardiology consultation requested for the severe aortic stenosis and they recommended outpatient follow-up with TAVR clinic  -Eliquis and Plavix resumed      Hx of CVA  -Plavix and Eliquis have been resumed after G-tube placement.    Hypokalemia  Hypomagnesemia and hypophosphatemia  -Electrolytes replacement protocol     MEL  -CPAP     Hx of Acute disseminated encephalomyelitis  -Has residual right-sided weakness     MGUS  Follows with oncology.  Noted.     Pulmonary fibrosis  Not on home O2    Diarrhea  -Most likely antibiotic and/or tube feed related  -C. difficile negative    Diet: Adult Formula Drip Feeding: Continuous Jevity 1.5; Other - Specify in Comment; Goal Rate: 50; mL/hr; Medication - Feeding Tube Flush Frequency: At least 15-30 mL water before and after medication administration and with tube clogging; Amount to Se...     DVT Prophylaxis: DOAC   Bravo Catheter: Not present  Code Status: Full Code     Disposition Plan       Expected Discharge Date: 08/01/2022              Entered: Mj Sosa MD 07/29/2022, 1:18 PM        Clinically Significant Risk Factors Present on Admission                        The patient's care was discussed with the Bedside Nurse and Patient.    Mj Sosa MD  Hospitalist Service  [facility]  Text Page 7AM-6PM  Securely message with the Vocera Web Console (learn more here)  Text page via [employer] Paging/Directory    ______________________________________________________________________    Interval History   Underwent G-tube placement yesterday.  Endorses mild pain at surgical site otherwise no new complaints.  Respiratory status stable.  Will complete antibiotics today.    Data reviewed today: I reviewed all medications, new labs and imaging results over the last 24 hours. I personally reviewed no images or EKG's today.    Physical Exam   Vital signs:  Temp: 97.2  F (36.2  C) Temp src: Oral  "BP: 109/68 Pulse: 96   Resp: 18 SpO2: 94 % O2 Device: None (Room air) Oxygen Delivery: 1 LPM   Weight: 53 kg (116 lb 13.5 oz)  Estimated body mass index is 17.77 kg/m  as calculated from the following:    Height as of 6/13/22: 1.727 m (5' 8\").    Weight as of this encounter: 53 kg (116 lb 13.5 oz).      Wt Readings from Last 2 Encounters:   07/29/22 53 kg (116 lb 13.5 oz)   06/13/22 54.4 kg (120 lb)       Gen: AAOX3, NAD, thinly built  Resp: Coarse rhonchi bilaterally, normal effort of breathing.  CVS: RRR, no murmur  Abd/GI: Soft, non-tender. BS- normoactive.  G-tube in place.  Skin: Warm, dry no rashes  MSK:  no pedal edema  Neuro- CN- intact. No focal deficits.        Data   Recent Labs   Lab 07/29/22  0855 07/28/22  2313 07/28/22  0802 07/25/22  1434 07/25/22  0830 07/24/22  1532 07/24/22  0718 07/23/22  0829   WBC  --   --   --   --  9.2  --   --  13.2*   HGB  --   --   --   --  8.6*  --   --  10.0*   MCV  --   --   --   --  99  --   --  97   PLT  --   --   --   --  296  --   --  335   NA  --   --   --   --  143  --  142 141   POTASSIUM 4.1 4.4 3.0*   < > 3.4   < > 2.7* 3.9   CHLORIDE  --   --   --   --  113*  --  111* 113*   CO2  --   --   --   --  26  --  27 24   BUN  --   --   --   --  14  --  15 11   CR  --   --   --   --  0.79  --  0.78 0.66   ANIONGAP  --   --   --   --  4  --  4 4   ULISSES  --   --   --   --  8.0*  --  7.6* 7.6*   GLC  --   --   --   --  87  --  102* 89   ALBUMIN  --   --   --   --  2.2*  --   --   --     < > = values in this interval not displayed.       No results found for this or any previous visit (from the past 24 hour(s)).  Medications     dextrose       - MEDICATION INSTRUCTIONS -         apixaban ANTICOAGULANT  5 mg Oral or Feeding Tube BID     atorvastatin  40 mg Oral or NG Tube QPM     cefuroxime  500 mg Oral or Feeding Tube Q12H [facility] (08/20)     clopidogrel  75 mg Oral or Feeding Tube Daily     digoxin  125 mcg Oral Daily     doxycycline hyclate  100 mg Oral Q12H [facility] (08/20)     " gabapentin  300 mg Oral or Feeding Tube QAM     gabapentin  600 mg Oral or Feeding Tube At Bedtime     mirtazapine  15 mg Oral or Feeding Tube At Bedtime     sodium chloride (PF)  3 mL Intracatheter Q8H

## 2022-07-29 NOTE — PLAN OF CARE
Goal Outcome Evaluation:    Patient is A&O x4. Turn and repo this shift. Pain managed with IV Dilaudid and tylenol solution. Small dried drainage on G tube site. Dressing on lap site x2, CDI. Potassium replaced, lab recheck @ 2330. Frequent oral care provided. GTube clamped @ 2100 for medication administration, unclamp @ 2330, will update oncoming RN. Incontinent of bowel and bladder., C-diff negative. Tube feeding start 07/29 @ 0700 per order. Will continue to monitor.

## 2022-07-29 NOTE — PLAN OF CARE
Goal Outcome Evaluation:    Overall Patient Progress: improving    Outcome Evaluation: Inadequate PO x8 days this admission but TF were started today and will slowly increase to goal rate per orders as tolerated

## 2022-07-29 NOTE — PLAN OF CARE
Goal Outcome Evaluation:    Plan of Care Reviewed With: patient     Overall Patient Progress: improving    Date/Time:07/29/22    Trauma/Ortho/Medical (Choose one) :Medical  Diagnosis: Asp Pneumonia (chronic dysphagia)    Mental Status:A&O x 4  Activity/dangle:A-1 walker ,GB, declined OOB activity when encouraged this shift. Turn/repos in bed.   Diet: NPO. TF at 10 ml/hr, increase at 1830 to 20cc per order. Tolerated H20 fushes.  Pain:Tylenol PRN effective for G tube discomfort.   Bravo/Voiding:Urinal, occ incontinent.   Tele/Restraints/Iso:None  02/LDA:RA  D/C Date:TBD  Other Info: Enc IS use, LS dim, last day of Ceftin and doxy. Oral cares performed. Inc, loose/watery BM x2, watery x1 on bedpan, C diff negative on 7/28. POD 1: G-tube placement, lap sites x2 CDI. K, Mag, Phos WDL, recheck in AM per replacement protocol. Content watching tv or resting between cares. Asymptomatic Covid negative today.

## 2022-07-29 NOTE — PLAN OF CARE
Pt A/Ox4, although lethargic, denies pain, CMS intact, k replaced and stable, NPO, will start feeding this am, will continue to monitor

## 2022-07-29 NOTE — PROGRESS NOTES
[facility]    General Surgery  Daily Progress Note       Assessment and Plan:   Efrem Ramos is a 77 year old male with dysphagia, 1 day s/p laparoscopic G tube placement    PLAN:  - Okay for meds through G tube. Transition from IV analgesia.  - Ok to start trickle tube feeds.  - Advance to goal per nutrition as tolerated. Senokot BID prn.  - Continue to flush per protocol.  - Eliquis resumed last night.  - All other cares per primary team.  - General Surgery will sign off. Please call with questions.        Interval History:   Efrem Ramos is seen this morning on surgical rounds. He has some pain at G tube site which was worse with coughing this morning. It is controlled without analgesia this morning. He is unsure if passed flatus since surgery, denies abdominal bloating. Tube feeds started by nutrition this morning. Vitals wnl.          Physical Exam:   Temp: 98.5  F (36.9  C) Temp src: Oral BP: 96/59 Pulse: 96   Resp: 18 SpO2: 93 % O2 Device: None (Room air) Oxygen Delivery: 1 LPM    I/O last 3 completed shifts:  In: 505 [I.V.:400; NG/GT:105]  Out: 355 [Urine:350; Blood:5]    GENERAL: VS reviewed, alert, oriented, no acute distress  LUNGS: Normal respiratory effort, no wheezing  ABDOMEN:  Soft, appropriately tender, non-distended and good bowel sounds, G tube site without drainage  INCISION: Bandaids removed, steris clean, dry, and intact. No surrounding erythema.  EXTREMITIES: Moving all extremities, no gross deformities, well perfused, no lower extremity edema or tenderness  NEUROLOGICAL: Grossly non-focal, mood & affect appropriate    Data   Recent Labs   Lab 07/28/22  2313 07/28/22  0802 07/27/22  0758 07/25/22  1434 07/25/22  0830 07/24/22  1532 07/24/22  0718 07/23/22  0829   WBC  --   --   --   --  9.2  --   --  13.2*   HGB  --   --   --   --  8.6*  --   --  10.0*   MCV  --   --   --   --  99  --   --  97   PLT  --   --   --   --  296  --   --  335   NA  --   --   --   --   143  --  142 141   POTASSIUM 4.4 3.0* 3.5   < > 3.4   < > 2.7* 3.9   CHLORIDE  --   --   --   --  113*  --  111* 113*   CO2  --   --   --   --  26  --  27 24   BUN  --   --   --   --  14  --  15 11   CR  --   --   --   --  0.79  --  0.78 0.66   ANIONGAP  --   --   --   --  4  --  4 4   ULISSES  --   --   --   --  8.0*  --  7.6* 7.6*   GLC  --   --   --   --  87  --  102* 89   ALBUMIN  --   --   --   --  2.2*  --   --   --     < > = values in this interval not displayed.       Leann Lama PA-C

## 2022-07-29 NOTE — PROGRESS NOTES
Cross cover note    Called to resume Eliquis and Plavix post g-tube placement. Confirm in Dr. Sosa's note.    Fadumo Mendoza PA-C

## 2022-07-29 NOTE — PROGRESS NOTES
CLINICAL NUTRITION SERVICES - REASSESSMENT NOTE      Malnutrition: (7/22)  % Weight Loss:  > 7.5% in 3 months (severe malnutrition)  % Intake:  </= 50% for >/= 1 month (severe malnutrition)  Subcutaneous Fat Loss:  Orbital region severe depletion, Upper arm region severe depletion and Thoracic region severe depletion  Muscle Loss:  Temporal region severe depletion, Clavicle bone region severe depletion, Scapular bone region severe depletion and Dorsal hand region severe depletion (did not observe LEs today)  Fluid Retention:  None noted     Malnutrition Diagnosis: Severe malnutrition  In Context of:  Chronic illness or disease       EVALUATION OF PROGRESS TOWARD GOALS   Diet:  NPO    Nutrition Support:  TF begins this AM at 10 mL/hr per orders and will advance to goal rate q 12 hrs as tolerated     Nutrition Support Enteral:  Type of Feeding Tube: G-tube   Enteral Frequency:  Continuous  Enteral Regimen: Jevity 1.5 at goal rate of 50 mL/hr  Total Enteral Provisions: 1800 kcals (35 kcal/kg), 76 g PRO (1.5 g/kg), 912 ml free H20, 258 g CHO, and 25 g fiber daily  Free Water Flush: 60 mL q 4 hrs       Intake/Tolerance:    Day 8 inadequate nutrition since admission     Labs reviewed: K 4.1, Mg 2.2, Phos 4.7 (H, elevated x1 day)  Recent Labs   Lab 07/25/22  0830 07/24/22  0718 07/23/22  0829   GLC 87 102* 89     Medications reviewed: Remeron, LR IVF stopped yesterday   Stooling:  - 7/25: BM x2  - 7/26: BM x8  - 7/27: BM x5  - 7/28: BM x3  Wt: 116 lbs 13.5 oz  Vitals:    07/25/22 0900 07/26/22 1300 07/27/22 0646 07/28/22 0547   Weight: 50 kg (110 lb 3.7 oz) 51.9 kg (114 lb 6.7 oz) 48 kg (105 lb 13.1 oz) 54.2 kg (119 lb 7.8 oz)    07/29/22 0642   Weight: 53 kg (116 lb 13.5 oz)         ASSESSED NUTRITION NEEDS:  Dosing Weight 52 kg - 7/26  Estimated Energy Needs: 5448-6231 kcals (35-40 Kcal/Kg)  Justification: repletion and underweight  Estimated Protein Needs: 65-94 grams protein (1.2-1.8 g  pro/Kg)  Justification: Repletion  Estimated Fluid Needs: 1 mL/kcal or per MD       NEW FINDINGS:   Laparoscopic Gtube placed yesterday     Previous Goals:   Patient will tolerance EN start w/o refeeding in the next 48 hrs   Evaluation: Not met within time frame     Previous Nutrition Diagnosis:   Inadequate protein-energy intake related to limited oral intake with restricted diet order among poor appetite, now with TF planned as evidenced by day 6 minimal PO this admission and intake of 1 small meal/day for months to a year PTA  Evaluation: No change      CURRENT NUTRITION DIAGNOSIS  Inadequate protein-energy intake related to limited oral intake with restricted diet order among poor appetite, now with TF planned as evidenced by day 8 minimal PO this admission and intake of 1 small meal/day for months to a year PTA    INTERVENTIONS  Recommendations / Nutrition Prescription  Continue TF advancements per order, as tolerated   Increase FWF to meet hydration needs if IVF remains off and remains NPO    Implementation  EN Schedule: continue per current orders     Goals  EN tolerance to goal rate w/o refeeding within 48 hrs       MONITORING AND EVALUATION:  Progress towards goals will be monitored and evaluated per protocol and Practice Guidelines      Shoshana Bishop RD, LD  Clinical Dietitian

## 2022-07-29 NOTE — DISCHARGE INSTRUCTIONS
[facility] - [facility]  Discharge Instructions: Post-Operative Laparoscopic G-Tube Placement    ACTIVITY  Expect to feel tired after your surgery.  This will gradually resolve.    Take frequent, short walks and increase your activity gradually.    Avoid strenuous physical activity or heavy lifting greater than 15-20 lbs. for 2-3 weeks.  You may climb stairs.  You may drive without restrictions when you are not using any prescription pain medication and feel comfortable in a car.  You may return to work/school when you are comfortable without any prescription pain medication.    G TUBE CARE  You may start tube feedings per nutrition recommendations.   It's normal to have drainage surrounding your G-tube site at times. This directly connects to your stomach and while this is scarring to your abdominal wall, you may get some gastric secretions that escape around the tube. Yellowish and greenish drainage (like bile) is normal colored drainage. If the drainage is white or looks like pus, please give us a call.   Try to keep the G tube site as clean and dry as you can to decrease irritation around the tube. Also monitor for signs of redness, increased pain at the site, or fever/chills.   Flush G-tube with 20cc saline twice a day.    WOUND CARE  You may shower 48 hours after the surgery.  Pat your incisions dry and leave them open to air.  Re-apply dressing (Band-Aids or gauze/tape) as needed for comfort or drainage.  You have steri-strips (looks like white tape) on your incision.  You may peel off the steri-strips 2 weeks after your surgery if they have not peeled off on their own.   Do not soak your incisions in a tub or pool for 2 weeks.   Do not apply any lotions, creams, or ointments to your incisions.  A ridge under your incisions is normal and will gradually resolve.    PAIN  Expect some tenderness and discomfort at the incision sites.  Use the prescribed pain medication at your  discretion.  Expect gradual resolution of your pain over several days.  Do not drink alcohol or drive while you are taking pain medications.  You may apply ice to your incisions in 20 minute intervals as needed for the next 48 hours.  After that time, consider switching to heat if you prefer.    EXPECTATIONS  Pain medications can cause constipation.  Limit use when possible.  Take over the counter stool softener/stimulant, such as Colace or Senna, 1-2 times a day with plenty of water.  You may take a mild over the counter laxative, such as Miralax or a suppository, as needed.  You may discontinue these medications once you are having regular bowel movements and/or are no longer taking your narcotic pain medication.     You may have shoulder or upper back discomfort due to the gas used in surgery.  This is temporary and should resolve in 48-72 hours.  Short, frequent walks may help with this.    FOLLOW UP  You may follow up with your primary care physician in 2 weeks for the removal of the suture adhering the G tube to the skin. You do not need to follow up with our clinic if you are doing well with tube feeds.  If you have any questions or concerns, or would like to be seen, please call us at [phone] and ask to speak with our nurse.  We are located at [address] [place] [address].    CALL OUR OFFICE -[phone] IF YOU HAVE:   Chills or fever above 101 F.  Increased redness, warmth, or drainage at your incisions.  Significant bleeding.  Pain not relieved by your pain medication or rest.  Increasing pain after the first 48 hours.  Any other concerns or questions.    **If you have concerns or questions about your procedure,    please contact Dr. Weber at  [phone]**

## 2022-07-29 NOTE — CONSULTS
Care Management Initial Consult    General Information  Assessment completed with: Patient, Alfredo  Type of CM/SW Visit: Initial Assessment    Primary Care Provider verified and updated as needed: Yes   Readmission within the last 30 days:        Reason for Consult: discharge planning  Advance Care Planning:            Communication Assessment  Patient's communication style: spoken language (English or Bilingual)    Hearing Difficulty or Deaf: no   Wear Glasses or Blind: yes    Cognitive  Cognitive/Neuro/Behavioral: WDL  Level of Consciousness: lethargic  Arousal Level: arouses to voice  Orientation: oriented x 4  Mood/Behavior: calm     Speech: slow    Living Environment:   People in home: child(олег), adult  Efrem, suman  Current living Arrangements: house      Able to return to prior arrangements: yes       Family/Social Support:  Care provided by: self, child(олег)  Provides care for: no one, unable/limited ability to care for self  Marital Status:   Children          Description of Support System: Supportive, Involved         Current Resources:   Patient receiving home care services:       Community Resources:    Equipment currently used at home: walker, rolling, shower chair  Supplies currently used at home:      Employment/Financial:  Employment Status: retired        Financial Concerns:             Lifestyle & Psychosocial Needs:  Social Determinants of Health     Tobacco Use: Medium Risk     Smoking Tobacco Use: Former Smoker     Smokeless Tobacco Use: Never Used   Alcohol Use: Not on file   Financial Resource Strain: Not on file   Food Insecurity: Not on file   Transportation Needs: Not on file   Physical Activity: Not on file   Stress: Not on file   Social Connections: Not on file   Intimate Partner Violence: Not on file   Depression: Not at risk     PHQ-2 Score: 0   Housing Stability: Not on file       Functional Status:  Prior to admission patient needed assistance:              Mental Health Status:           Chemical Dependency Status:                Values/Beliefs:  Spiritual, Cultural Beliefs, Mandaen Practices, Values that affect care:                 Additional Information:  Consult for discharge planning. Patient admitted on 7/21/22 for generalized weakness and pneumonia of right lung due to infectious organism and a tentative discharge date of 8/1/22.  Writer reviewed chart and TCU recommendations at discharge. Writer met with patient at bedside and introduced self and role. Patient in agreement for TCU at discharge and would like referrals sent to [place] as he has been there a few times. Writer also instructed to call patients son Efrem as he cares for patient and lives with patient. Call placed to Efrem but his phone was not accepting voicemail's at this time.     Referral sent via [facility] to [facility].  to follow tomorrow with patient's son to discuss plan further.    THEO Durán

## 2022-07-30 NOTE — PROVIDER NOTIFICATION
MD Notification    Notified Person: MD    Notified Person Name: Tony Dotson    Notification Date/Time: 7/30/22 @ 0108    Notification Interaction: paged    Purpose of Notification: FYI: Pt JM in 2325 BP 88/59; order to notify provider if SBP < 90. Pls. advise. Thank you. Tiburcio LOZANO, RN  *[phone]    Orders Received: awaiting orders    Comments: received a call back; keep monitoring pt per provider.

## 2022-07-30 NOTE — PLAN OF CARE
Pt A&Ox4; VSS on RA except hypotensive. Hospitalist paged regarding low BP per order *see previous note* denies SOB or dizziness. IV line was restored to L arm; SL.TF running @ 20 mL; 60 mL of FFW Q4. Tolerating well. G-tube patent; site intact. Lap sites healing well; steri strips in place. Reported no pain this shift. Plan to discharge to TCU pending.

## 2022-07-30 NOTE — PLAN OF CARE
Goal Outcome Evaluation:    Patient A&Ox4. Hypotensive, MD Notified one time Bolus given per MD order. Recheck BP still low 85/58, last BP was 80/51 HR 85 MD notified no order place at this time. Pain mange with PRN tylenol, IV dilaudid given x1 for back and abdominal doscomfort. Denied SOB. TF increase @ 1000 am from 20 to 35 mL/hr continuously, flushing @ 60 mL Q4hrs and tolerating well. GT site patent. Lap sited healing well w/ sterile strips in place no drain noted. Turn/repo Q2hrs. Incontinent of B&B, did have x3 loose/watery stool. Patient refused to get OOB on this shift otherwise assist of x1 GB/W.Pending discharge plan to TCU.

## 2022-07-30 NOTE — PROGRESS NOTES
BRIEF NUTRITION NOTE:    Monitoring TF adequacy and tolerance.  A full Nutrition Reassessment was completed 7/29. See note for details.    NEW FINDINGS:  TF via G-tube is slowly ramping up, was 20 mL/hr since 1830 yesterday and suspect 35 mL/hr currently.  K 3.5 (NL), Mg 2.2 (NL), Phos 3.3 (NL) - refeeding labs acceptable.  I/O incomplete. Wt: 50 kg (down 2 kg since admit). Last Na on 7/25 was 143 (NL).   IVF was turned off on 7/28 and pt on H20 flushes 60 mL every 4 hrs (360 mL).  Note patient has been hypotensive.    INTERVENTIONS:  Feeding tube flush - Will increase free H20 via FT to 140 mL every 4 hrs (840 mL). When goal TF rate 50 mL/hr achieved, TF will provide 912 mL free H20. Thus total fluid (TF + Flushes) = 1752 mL (34 mL/kg).  Lab Value - Will add on Na from this morning's blood draw. Pt may need even more H20 flushes via FT?    Hemalatha Herbert, RD, LD, CNSC

## 2022-07-30 NOTE — PROGRESS NOTES
[facility]    Medicine Progress Note - Hospitalist Service        Date of Admission:  7/21/2022  5:01 PM    Assessment & Plan:   Efrem Ramos is a 77 year old male who was admitted on 7/21/2022 for aspiration pneumonia.     Aspiration  pneumonia with history of dysphagia  -Patient presented to hospital with asymptomatic hypotension which resolved prior to arrival.   -found to have groundglass opacities on CT scan of the chest.    -SARS COVID-negative.    -Patient denying any cough, so unable to obtain sputum sample.  Patient is supposed to be on a puréed diet due to his dysphagia however he has been eating solid foods.  He denies any episodes of aspiration.  -Completed 7 days of antibiotics on 7/29  -Blood cultures no growth on 7/21.    Severe malnutrition with history of dysphagia  -Patient has not been eating much because of dislike to the puréed diet with his history of dysphagia.  -Continue tube feeds for now, as below plan for G-tube placement.  -G-tube was planned for today however IR unable to do the procedure due to technical difficulty.  -General surgery consulted underwent G-tube placement on 7/28.  -Continue tube feeding through G-tube      Hypotension with chronic low blood pressures at baseline in the 80s  -Clinic visits indicates blood pressure is 80 over 50s  -Blood pressure fairly stable in the 90s at the moment.  -Pressure in the 80s again today, endorses mild dizziness.  We will give him normal saline 500 cc x 1 and reevaluate.     Afib/flutter  Severe aortic stenosis  HTN  HLD  PVD  CAD   -Continue PTA atorvastatin, digoxin.  -Noted pharmacy comments regarding digoxin levels.  Recommend rechecking digoxin level as outpatient in a couple of weeks and adjusting accordingly.  For the moment he is therapeutic and marginally above desired range so I would not suggest making any changes.  -Echo done during this hospitalization showed EF of 60 to 65%.  Severe low-flow low gradient  aortic valve stenosis with mean gradient of 23 to 25 mmHg.  Valve area 0.75 cm  severe tricuspid valve regurgitation.  -Cardiology consultation requested for the severe aortic stenosis and they recommended outpatient follow-up with TAVR clinic  -Continue Eliquis and Plavix      Hx of CVA  -Plavix and Eliquis     Hypokalemia  Hypomagnesemia and hypophosphatemia  -Electrolytes replacement protocol     MEL  -CPAP     Hx of Acute disseminated encephalomyelitis  -Has residual right-sided weakness     MGUS  Follows with oncology.  Noted.     Pulmonary fibrosis  Not on home O2    Diarrhea  -Most likely antibiotic and/or tube feed related  -C. difficile negative  -Symptomatic treatment    Diet: Adult Formula Drip Feeding: Continuous Jevity 1.5; Other - Specify in Comment; Goal Rate: 50; mL/hr; Medication - Feeding Tube Flush Frequency: At least 15-30 mL water before and after medication administration and with tube clogging; Amount to Se...     DVT Prophylaxis: DOAC   Bravo Catheter: Not present  Code Status: Full Code     Disposition Plan       Expected Discharge Date: 08/01/2022              Entered: Mj Sosa MD 07/30/2022, 9:32 AM        Clinically Significant Risk Factors Present on Admission                        The patient's care was discussed with the Bedside Nurse and Patient.    Mj Sosa MD  Hospitalist Service  [facility]  Text Page 7AM-6PM  Securely message with the Vocera Web Console (learn more here)  Text page via Cards Off Paging/Directory    ______________________________________________________________________    Interval History   Blood pressure slightly lower this morning although patient has had chronic hypotension previously.  Endorses mild dizziness.  Tolerating tube feed well so far.    Data reviewed today: I reviewed all medications, new labs and imaging results over the last 24 hours. I personally reviewed no images or EKG's today.    Physical Exam   Vital  "signs:  Temp: 97.2  F (36.2  C) Temp src: Oral BP: (!) 85/58 Pulse: 67   Resp: 18 SpO2: 91 % O2 Device: None (Room air) Oxygen Delivery: 1 LPM   Weight: 50.3 kg (110 lb 14.3 oz)  Estimated body mass index is 16.86 kg/m  as calculated from the following:    Height as of 6/13/22: 1.727 m (5' 8\").    Weight as of this encounter: 50.3 kg (110 lb 14.3 oz).      Wt Readings from Last 2 Encounters:   07/30/22 50.3 kg (110 lb 14.3 oz)   06/13/22 54.4 kg (120 lb)       Gen: AAOX3, NAD, thinly built  Resp: Coarse rhonchi bilaterally, normal effort of breathing.  CVS: RRR, no murmur  Abd/GI: Soft, non-tender. BS- normoactive.  G-tube in place.  Skin: Warm, dry no rashes  MSK:  no pedal edema  Neuro- CN- intact. No focal deficits.        Data   Recent Labs   Lab 07/30/22  0713 07/29/22  0855 07/28/22  2313 07/25/22  1434 07/25/22  0830 07/24/22  1532 07/24/22  0718   WBC  --   --   --   --  9.2  --   --    HGB  --   --   --   --  8.6*  --   --    MCV  --   --   --   --  99  --   --    PLT  --   --   --   --  296  --   --      --   --   --  143  --  142   POTASSIUM 3.5 4.1 4.4   < > 3.4   < > 2.7*   CHLORIDE  --   --   --   --  113*  --  111*   CO2  --   --   --   --  26  --  27   BUN  --   --   --   --  14  --  15   CR  --   --   --   --  0.79  --  0.78   ANIONGAP  --   --   --   --  4  --  4   ULISSES  --   --   --   --  8.0*  --  7.6*   GLC  --   --   --   --  87  --  102*   ALBUMIN  --   --   --   --  2.2*  --   --     < > = values in this interval not displayed.       No results found for this or any previous visit (from the past 24 hour(s)).  Medications     dextrose       - MEDICATION INSTRUCTIONS -         sodium chloride 0.9%  500 mL Intravenous Once     apixaban ANTICOAGULANT  5 mg Oral or Feeding Tube BID     atorvastatin  40 mg Oral or NG Tube QPM     clopidogrel  75 mg Oral or Feeding Tube Daily     digoxin  125 mcg Oral Daily     gabapentin  300 mg Oral or Feeding Tube QAM     gabapentin  600 mg Oral or Feeding " Tube At Bedtime     mirtazapine  15 mg Oral or Feeding Tube At Bedtime     sodium chloride (PF)  3 mL Intracatheter Q8H

## 2022-07-30 NOTE — PLAN OF CARE
Goal Outcome Evaluation:      A&OX4. VSS on room air. G tube patent and intact. NPO. Pain rated 8/10 in abdominal area, tylenol given which was effective. Pt on continuous tube feeding. Tube feeding increased per order to 20ml/hr, residual checked for a result of 0ml. 60ml flush q4h given. Ao1 GBW. IV removed at around 1800 due to it leaking and pain at site. Pt stated that he did not want it replaced unless it was needed, pt is not on IV medications and is tolerating g-tube well, so writer did not replace. discharge pending.

## 2022-07-30 NOTE — PROGRESS NOTES
Care Management Follow Up    Length of Stay (days): 9    Expected Discharge Date: 08/01/2022     Concerns to be Addressed:       Patient plan of care discussed at interdisciplinary rounds: Yes    Anticipated Discharge Disposition: Skilled Nursing Facility     Anticipated Discharge Services: None  Anticipated Discharge DME: None    Patient/family educated on Medicare website which has current facility and service quality ratings: no  Education Provided on the Discharge Plan:    Patient/Family in Agreement with the Plan: yes    Referrals Placed by CM/SW:    Private pay costs discussed: Not applicable    Additional Information:  Writer called patient's son Efrem to discuss discharge planning. Writer updated him about referral sent to [facility] but that it was declined as they don't have beds. Efrem stated that patient had been to [facility] in the past and would be open to a referral sent there.     Efrem mentioned that he has concerns patient's pneumonia from last hospitalization was not fully treated when he was sent to [facility]. Writer noted that his concerns would be documented for providers to follow up with him when patient is ready to discharge.     Referral sent to [facility] via [facility].    THEO Durán

## 2022-07-30 NOTE — PROVIDER NOTIFICATION
MD Notification    Notified Person: MD    Notified Person Name:Mj Sosa MD    Notification Date/Time: 0824 7/30/22    Notification Interaction: Page     Purpose of Notification: Low BP 85/56    Orders Received: Not yet

## 2022-07-31 NOTE — PLAN OF CARE
Goal Outcome Evaluation:    Plan of Care Reviewed With: patient     Overall Patient Progress: no change     A&O. VSS, ex soft BP, provider aware. RA. CMS intact. Pain managed with tylenol. Up assist of 2 and walker, sat in chair.  Repo q2hrs, changed to pulsate mattress this AM. Incontinent of B&B, gave Imodium for loose stool.  Tube feeding running 50 ml/hr, q 4hr flush. NPO. Continue to monitor.

## 2022-07-31 NOTE — PROVIDER NOTIFICATION
MD Notification    Notified Person: MD    Notified Person Name:Dr. Sosa    Notification Date/Time:7/31/22 1700    Notification Interaction:web page    Purpose of Notification:soft BP in 80's/50    Orders Received:    Comments:pt asymptomatic, monitor.

## 2022-07-31 NOTE — PROGRESS NOTES
AxOx4, VSS blood pressure closely monitored and remained at baseline. Denies pain no pain PRNs given. Incontinent of bowel and bladder, changed frequently, large loose stool, q2 turns.    Increased tube feed to goal rate at 2300 to 50 mL/hr, additionally free flush changed to 140 mL per four hours, tolerating well, ordered a pulsate bed.

## 2022-07-31 NOTE — PROVIDER NOTIFICATION
MD Notification    Notified Person: MD    Notified Person Name:Dr. Sosa    Notification Date/Time:7/31/22 1100    Notification Interaction:web page    Purpose of Notification:Lactic 2.3    Orders Received:reordered lab    Comments:

## 2022-07-31 NOTE — PROGRESS NOTES
[facility]    Medicine Progress Note - Hospitalist Service        Date of Admission:  7/21/2022  5:01 PM    Assessment & Plan:   Efrem Ramos is a 77 year old male who was admitted on 7/21/2022 for aspiration pneumonia.     Aspiration  pneumonia with history of dysphagia  Pulmonary fibrosis  -Patient presented to hospital with asymptomatic hypotension which resolved prior to arrival.   -found to have new groundglass opacities on CT scan of the chest was suggestive of atypical multifocal pneumonia although patient does have background fibrosis and interstitial lung disease.    -SARS COVID-negative.    -Patient denying any cough, so unable to obtain sputum sample.  Patient is supposed to be on a puréed diet due to his dysphagia however he has been eating solid foods.  He denies any episodes of aspiration.  -Blood cultures no growth on 7/21.  -Completed 7 days of antibiotics on 7/29    New fever  -Patient with a temperature of 100.6 this morning  -Also has mildly elevated lactate  -High risk for infection given multiple medical comorbidities although just completed antibiotic course on 7/29 with cefuroxime and doxycycline.  -Check UA  -Repeat lactate at 2 PM  -Monitor fever profile  -Check CBC  -Check procalcitonin    Severe malnutrition with history of dysphagia  -Patient has not been eating much because of dislike to the puréed diet with his history of dysphagia.  -Continue tube feeds for now, as below plan for G-tube placement.  -G-tube was planned for today however IR unable to do the procedure due to technical difficulty.  -General surgery consulted; underwent G-tube placement on 7/28.  -Continue tube feeding through G-tube      Hypotension with chronic low blood pressures at baseline in the 80s  -Clinic visits indicates blood pressure is 80 over 50s  -Has had intermittent lows although blood pressure fairly stable in the 90s-100s at the moment.  -Monitor closely      Afib/flutter  Severe  aortic stenosis  HTN  HLD  PVD  CAD   -Continue PTA atorvastatin, digoxin.  -Noted pharmacy comments regarding digoxin levels. Recommend rechecking digoxin level as outpatient in a couple of weeks and adjusting accordingly.  For the moment he is therapeutic and marginally above desired range so I would not suggest making any changes.  -Echo done during this hospitalization showed EF of 60 to 65%. Severe low-flow low gradient aortic valve stenosis with mean gradient of 23 to 25 mmHg.  Valve area 0.75 cm  severe tricuspid valve regurgitation.  -Evaluated by Dr. Cortes on 7/22.  Please review his notes.  He feels TAVR also likely will be difficult although plan is to follow-up outpatient after he has recovered from this pneumonia.  -Continue Eliquis and Plavix      Hx of CVA  -Plavix and Eliquis     Hypokalemia  Hypomagnesemia and hypophosphatemia  -Electrolytes replacement protocol     MEL  -CPAP     Hx of Acute disseminated encephalomyelitis  -Has residual right-sided weakness     MGUS  Follows with oncology. Noted.     Diarrhea  -Most likely antibiotic and/or tube feed related  -C. difficile negative  -Symptomatic treatment    Anemia chronic disease  -Probably this is multifactorial, patient has multiple medical comorbidities that could cause anemia  -Recent baseline has been between 8-10, likely at baseline  -Monitor intermittently.    Diet: Adult Formula Drip Feeding: Continuous Jevity 1.5; Other - Specify in Comment; Goal Rate: 50; mL/hr; Medication - Feeding Tube Flush Frequency: At least 15-30 mL water before and after medication administration and with tube clogging; Amount to Se...     DVT Prophylaxis: DOAC   Bravo Catheter: Not present  Code Status: Full Code     Disposition Plan  8/2/2022, to TCU if current infectious work-up is negative.    Entered: Mj Sosa MD 07/31/2022, 11:58 AM        Clinically Significant Risk Factors Present on Admission                        The patient's care was  "discussed with the Bedside Nurse and Patient.  And had a long discussion with son Alfredo on the phone.  I explained to him that his dad has multitude of chronic medical problems that puts him at high risk for recurrent hospitalization.  Alfredo acknowledged this fact but would like to continue current treatment especially now that he has a G-tube for feeding with the hope that his nutritional status and hopefully his clinical condition will enhance.    Mj Sosa MD  Hospitalist Service  [facility]  Text Page 7AM-6PM  Securely message with the Vocera Web Console (learn more here)  Text page via [employer] Paging/Directory    ______________________________________________________________________    Interval History   Temperature of 100.6 this morning.  Patient denies new cough.  Denies dysuria.  Was quite hypertensive yesterday and this appears to have resolved.  On reviewing old records it does appear like he has had chronic hypotension      Data reviewed today: I reviewed all medications, new labs and imaging results over the last 24 hours. I personally reviewed no images or EKG's today.    Physical Exam   Vital signs:  Temp: 97.6  F (36.4  C) Temp src: Oral BP: 92/63 Pulse: 92   Resp: 16 SpO2: 95 % O2 Device: None (Room air) Oxygen Delivery: 1 LPM   Weight: 50.3 kg (110 lb 14.3 oz)  Estimated body mass index is 16.86 kg/m  as calculated from the following:    Height as of 6/13/22: 1.727 m (5' 8\").    Weight as of this encounter: 50.3 kg (110 lb 14.3 oz).      Wt Readings from Last 2 Encounters:   07/30/22 50.3 kg (110 lb 14.3 oz)   06/13/22 54.4 kg (120 lb)       Gen: AAOX3, NAD, thinly built, frail  Resp: Coarse rhonchi bilaterally, normal effort of breathing.  CVS: RRR, no murmur  Abd/GI: Soft, non-tender. BS- normoactive.  G-tube in place.  Skin: Warm, dry no rashes  MSK:  no pedal edema  Neuro- CN- intact. No focal deficits.        Data   Recent Labs   Lab 07/31/22  0652 07/30/22 2124 " 07/30/22  1704 07/30/22  0713 07/29/22  0855 07/25/22  1434 07/25/22  0830   WBC  --   --   --   --   --   --  9.2   HGB  --   --   --   --   --   --  8.6*   MCV  --   --   --   --   --   --  99   PLT  --   --   --   --   --   --  296   NA  --   --   --  142  --   --  143   POTASSIUM 3.3*  --   --  3.5 4.1   < > 3.4   CHLORIDE  --   --   --   --   --   --  113*   CO2  --   --   --   --   --   --  26   BUN  --   --   --   --   --   --  14   CR  --   --   --   --   --   --  0.79   ANIONGAP  --   --   --   --   --   --  4   ULISSES  --   --   --   --   --   --  8.0*   GLC  --  113* 127*  --   --   --  87   ALBUMIN  --   --   --   --   --   --  2.2*    < > = values in this interval not displayed.       No results found for this or any previous visit (from the past 24 hour(s)).  Medications     dextrose       - MEDICATION INSTRUCTIONS -         apixaban ANTICOAGULANT  5 mg Oral or Feeding Tube BID     atorvastatin  40 mg Oral or NG Tube QPM     clopidogrel  75 mg Oral or Feeding Tube Daily     digoxin  125 mcg Oral Daily     gabapentin  300 mg Oral or Feeding Tube QAM     gabapentin  600 mg Oral or Feeding Tube At Bedtime     mirtazapine  15 mg Oral or Feeding Tube At Bedtime     sodium chloride (PF)  3 mL Intracatheter Q8H

## 2022-08-01 NOTE — PLAN OF CARE
Goal Outcome Evaluation:    Pt. A&O x4, very soft spoken. NPO. Had couple episodes of watery/loose stool, gave imodium. Tylenol for pain. Frequent turn&repo. Incont' B&B. BG check for 103 this shift. Tube feeding infusing @50mL/hr & flush q4h. UA collected & sent. Appeared to sleep very well through the night. Will cont' to monitor.

## 2022-08-01 NOTE — PLAN OF CARE
8/1/22 [id number]    Pt A&Ox4, soft spoken. VSS on RA. Ax1/G/W, OOB to chair x1. Cont bladder, uses urinal. Incont bowel, diarrhea x3 +, MD aware. Given imodium x1, helped. TF @50 ml/hr, FWF q4h 140 ml. Strict NPO. Pain in L side @PEG site, and when pt takes deep breath, MD notified in person. Pain managed w/ PRN dilaudid x2. LS diminished/coarse, infrequent productive cough. On pulsate bed, T&R q2h. Skin intact, reddened perineum, buttocks & back. Able to roll. K, Mg, phos protocol - to be rechecked AM. PT consult completed.

## 2022-08-01 NOTE — PROGRESS NOTES
CLINICAL NUTRITION SERVICES - REASSESSMENT NOTE      Malnutrition:  (7/22)  % Weight Loss:  > 7.5% in 3 months (severe malnutrition)  % Intake:  </= 50% for >/= 1 month (severe malnutrition)  Subcutaneous Fat Loss:  Orbital region severe depletion, Upper arm region severe depletion and Thoracic region severe depletion  Muscle Loss:  Temporal region severe depletion, Clavicle bone region severe depletion, Scapular bone region severe depletion and Dorsal hand region severe depletion (did not observe LEs today)  Fluid Retention:  None noted     Malnutrition Diagnosis: Severe malnutrition  In Context of:  Chronic illness or disease       EVALUATION OF PROGRESS TOWARD GOALS   Diet:  NPO    Nutrition Support:  Patient advanced to goal TF regimen on 7/30 and continues as follows ~    Nutrition Support Enteral:  Type of Feeding Tube: Lap G-tube   Enteral Frequency:  Continuous  Enteral Regimen: Jevity 1.5 at 50 mL/hr  Total Enteral Provisions: 1800 kcal (35 kcal/kg), 76 g protein (1.5 g/kg), 912 mL H2O, 258 g CHO, 25 g fiber   Free Water Flush: 140 mL every 4 hours   Total Fluid:  1752 mL (34 mL/kg)     Intake/Tolerance:    K/Mg/Phos normal (have been acceptable since TF initiation)  CRP 85.4 (H)  BGM < 150  BM x 2 today and x 6 yest --> Noted Imodium started yesterday   I/O incomplete, wt 59.6 kg (up overall)      ASSESSED NUTRITION NEEDS:  Dosing Weight 52 kg - 7/26  Estimated Energy Needs: 9722-8758 kcals (35-40 Kcal/Kg)  Justification: repletion and underweight  Estimated Protein Needs: 65-94 grams protein (1.2-1.8 g pro/Kg)  Justification: Repletion  Estimated Fluid Needs: 1 mL/kcal or per MD     Previous Goals (7/29):   EN tolerance to goal rate w/o refeeding within 48 hrs  Evaluation: Met    Previous Nutrition Diagnosis (7/29):   Inadequate protein-energy intake related to limited oral intake with restricted diet order among poor appetite, now with TF planned as evidenced by day 8 minimal PO this admission and intake  of 1 small meal/day for months to a year PTA  Evaluation: Resolved       CURRENT NUTRITION DIAGNOSIS  No nutrition diagnosis identified at this time    INTERVENTIONS  Recommendations / Nutrition Prescription  Continue goal TF regimen as above     Implementation  None     Goals  TF regimen will continue to meet % needs    MONITORING AND EVALUATION:  Progress towards goals will be monitored and evaluated per protocol and Practice Guidelines    Rocio Chang RD, LD, CNSC   Clinical Dietitian - [facility]

## 2022-08-01 NOTE — PLAN OF CARE
Goal Outcome Evaluation:    ([id number]) patient is A&O x4, very pleasant.  Turn and repo this shift. Tube Feeding @ 50 mL/hr with free water flush @ 140 mL very 4 hours. Incontinent of bowel x1 this shift. NPO, oral care provided. Complains of intermittent pain on PEG site, managed with IV dilaudid and reposition. Will continue to monitor.

## 2022-08-01 NOTE — PROGRESS NOTES
[facility]    Medicine Progress Note - Hospitalist Service    Date of Admission:  7/21/2022    Assessment & Plan          Efrem Ramos is a 77 year old male who was admitted on 7/21/2022 for aspiration pneumonia.     Aspiration  pneumonia with history of dysphagia  Pulmonary fibrosis  -Patient presented to hospital with asymptomatic hypotension which resolved prior to arrival.   -found to have new groundglass opacities on CT scan of the chest was suggestive of atypical multifocal pneumonia although patient does have background fibrosis and interstitial lung disease.    -SARS COVID-negative.    -Patient denying any cough, so unable to obtain sputum sample.  Patient is supposed to be on a puréed diet due to his dysphagia however he has been eating solid foods.  He denies any episodes of aspiration.  -Blood cultures no growth on 7/21.  -Completed 7 days of antibiotics on 7/29     New fever  -Patient with a temperature of 100.6 this morning  -Also has mildly elevated lactate  -High risk for infection given multiple medical comorbidities although just completed antibiotic course on 7/29 with cefuroxime and doxycycline.  No further fever, no sign of infection and blood cultures NGTD. Pro-calcitonin was negative.  WBC better today.  -Continue to monitor     Severe malnutrition with history of dysphagia  -Patient has not been eating much because of dislike to the puréed diet with his history of dysphagia.  -Continue tube feeds for now, as below plan for G-tube placement.  -G-tube was planned for today however IR unable to do the procedure due to technical difficulty.  -General surgery consulted; underwent G-tube placement on 7/28.  -Continue tube feeding through G-tube      Hypotension with chronic low blood pressures at baseline in the 80s  -Clinic visits indicates blood pressure is 80 over 50s  -Has had intermittent lows although blood pressure fairly stable in the 90s-100s at the  moment.  -Monitor closely      Afib/flutter  Severe aortic stenosis  HTN  HLD  PVD  CAD   -Continue PTA atorvastatin, digoxin.  -Noted pharmacy comments regarding digoxin levels. Recommend rechecking digoxin level as outpatient in a couple of weeks and adjusting accordingly.  For the moment he is therapeutic and marginally above desired range so I would not suggest making any changes.  -Echo done during this hospitalization showed EF of 60 to 65%. Severe low-flow low gradient aortic valve stenosis with mean gradient of 23 to 25 mmHg.  Valve area 0.75 cm  severe tricuspid valve regurgitation.  -Evaluated by Dr. Cortes on 7/22.  Please review his notes.  He feels TAVR also likely will be difficult although plan is to follow-up outpatient after he has recovered from this pneumonia.  -Continue Eliquis and Plavix      Hx of CVA  -Plavix and Eliquis      Hypokalemia  Hypomagnesemia and hypophosphatemia  -Electrolytes replacement protocol     MEL  -CPAP     Hx of Acute disseminated encephalomyelitis  -Has residual right-sided weakness     MGUS  Follows with oncology. Noted.     Diarrhea  -Most likely antibiotic and/or tube feed related  -C. difficile negative  -Symptomatic treatment     Anemia chronic disease  -Probably this is multifactorial, patient has multiple medical comorbidities that could cause anemia  -Recent baseline has been between 8-10, likely at baseline  -Monitor intermittently.       Diet: Adult Formula Drip Feeding: Continuous Jevity 1.5; Other - Specify in Comment; Goal Rate: 50; mL/hr; Medication - Feeding Tube Flush Frequency: At least 15-30 mL water before and after medication administration and with tube clogging; Amount to Se...    DVT Prophylaxis: DOAC  Bravo Catheter: Not present  Central Lines: None  Cardiac Monitoring: None  Code Status: Full Code      Disposition Plan      Expected Discharge Date: 08/02/2022    Discharge Delays: Placement - TCU  Lab Result Pending (enter specific test & time in  comments)            The patient's care was discussed with the Bedside Nurse and Patient.    Aidan Zazueta MD  Hospitalist Service  [facility]  Securely message with the Vocera Web Console (learn more here)  Text page via [employer] Paging/Directory         Clinically Significant Risk Factors Present on Admission                      ______________________________________________________________________    Interval History   Some pain at PEG site.      Data reviewed today: I reviewed all medications, new labs and imaging results over the last 24 hours. I personally reviewed no images or EKG's today.    Physical Exam   Vital Signs: Temp: 97.4  F (36.3  C) Temp src: Oral BP: 118/72 Pulse: 82   Resp: 16 SpO2: 100 % O2 Device: None (Room air)    Weight: 131 lbs 6.31 oz  Constitutional: awake, alert, cooperative, no apparent distress  Respiratory: No increased work of breathing, good air exchange, clear to auscultation bilaterally, no crackles or wheezing  Cardiovascular: regular rate and rhythm, normal S1 and S2, no S3 or S4, and no murmur noted  GI: normal bowel sounds, soft, non-distended, non-tender, no masses palpated. PEG site intact  Skin: no rashes and no jaundice  Neuropsychiatric: General: normal, calm and normal eye contact    Data   Recent Labs   Lab 08/01/22  0801 08/01/22  0748 08/01/22  0314 07/31/22  2217 07/31/22  2041 07/31/22  1704 07/31/22  1410 07/30/22  1704 07/30/22  0713   WBC  --  13.1*  --   --   --   --  17.3*  --   --    HGB  --  10.2*  --   --   --   --  9.9*  --   --    MCV  --  100  --   --   --   --  98  --   --    PLT  --  384  --   --   --   --  371  --   --    NA  --  142  --   --   --   --   --   --  142   POTASSIUM  --  3.6 3.8  --  3.4  --  3.1*   < > 3.5   CHLORIDE  --  114*  --   --   --   --   --   --   --    CO2  --  26  --   --   --   --   --   --   --    BUN  --  18  --   --   --   --   --   --   --    CR  --  0.59*  --   --   --   --   --   --    --    ANIONGAP  --  2*  --   --   --   --   --   --   --    ULISSES  --  8.5  --   --   --   --   --   --   --    GLC 86 89  --  103*  --    < >  --    < >  --     < > = values in this interval not displayed.     No results found for this or any previous visit (from the past 24 hour(s)).  Medications     dextrose       - MEDICATION INSTRUCTIONS -         apixaban ANTICOAGULANT  5 mg Oral or Feeding Tube BID     atorvastatin  40 mg Oral or NG Tube QPM     clopidogrel  75 mg Oral or Feeding Tube Daily     digoxin  125 mcg Oral Daily     gabapentin  300 mg Oral or Feeding Tube QAM     gabapentin  600 mg Oral or Feeding Tube At Bedtime     mirtazapine  15 mg Oral or Feeding Tube At Bedtime     sodium chloride (PF)  3 mL Intracatheter Q8H

## 2022-08-01 NOTE — PROGRESS NOTES
Care Management Follow Up    Length of Stay (days): 11    Expected Discharge Date: 08/02/2022     Concerns to be Addressed:       Patient plan of care discussed at interdisciplinary rounds: Yes    Anticipated Discharge Disposition: Skilled Nursing Facility     Anticipated Discharge Services: None  Anticipated Discharge DME: None    Patient/family educated on Medicare website which has current facility and service quality ratings: no  Education Provided on the Discharge Plan:    Patient/Family in Agreement with the Plan: yes    Referrals Placed by CM/SW:    Private pay costs discussed: Not applicable    Additional Information:  Call placed to [place] regarding referral status, message left.      THEO Durán

## 2022-08-01 NOTE — PLAN OF CARE
Goal Outcome Evaluation:          Overall Patient Progress: improving    Outcome Evaluation: TF meeting needs, continue current regimen    Rocio Chang RD, LD, CNSC   Clinical Dietitian - [facility]

## 2022-08-01 NOTE — CONSULTS
[facility]  WOC Nurse Inpatient Assessment     Consulted for: coccyx buttock left       Areas Assessed:      Areas visualized during today's visit: Focused: and Sacrum/coccyx    Wound location: buttock           Last photo: 8/1  Wound due to: Moisture Associated Skin Damage (MASD)  Wound history/plan of care: found with barrier cream at bedside   Wound base:  epidermis and dermis, blanchable erythema      Palpation of the wound bed: normal      Drainage: none     Description of drainage: none     Measurements (length x width x depth, in cm): diffuse, without measureable open area       Tunneling: N/A     Undermining: N/A  Periwound skin: Intact      Color: normal and consistent with surrounding tissue      Temperature: normal   Odor: none  Pain: denies , none  Pain interventions prior to dressing change: patient tolerated well  Treatment goal: Heal   STATUS: initial assessment  Supplies ordered: supplies stored on unit       Treatment Plan:      Wound care  Start:  08/01/22 1645,   EVERY SHIFT,   Routine        Comments: Location: buttock   Care: provided qshift by primary RN   1. Cleanse per incontinence protocol with katharina spray cleanser and dry soft wipes   2. Apply criticaid paste to perianal with each incontinence care. Do not need to cleanse off all smears of old paste, ok to wipe off soiled top layer and reapply more            Orders: Written    RECOMMEND PRIMARY TEAM ORDER: None, at this time  Education provided: plan of care  Discussed plan of care with: Patient and Nurse  WOC nurse follow-up plan: signing off  Notify WOC if wound(s) deteriorate.  Nursing to notify the Provider(s) and re-consult the WOC Nurse if new skin concern.    DATA:     Current support surface: Standard  Atmos Air mattress  Containment of urine/stool: Incontinence Protocol  BMI: Body mass index is 19.98 kg/m .   Active diet order: None     Output: I/O last 3 completed shifts:  In: 140 [NG/GT:140]  Out: -       Labs: Recent Labs   Lab 08/01/22  0748   HGB 10.2*   WBC 13.1*   CRP 85.4*     Pressure injury risk assessment:   Sensory Perception: 3-->slightly limited  Moisture: 3-->occasionally moist  Activity: 2-->chairfast  Mobility: 2-->very limited  Nutrition: 2-->probably inadequate  Friction and Shear: 2-->potential problem  Marv Score: 14    Anum BRADLEY   Dept. Pager: [phone]  Dept. Office Number: [phone]

## 2022-08-02 NOTE — PLAN OF CARE
Goal Outcome Evaluation:    DATE & TIME: 08/02/22, 5840-3957  Summary: Generalized weakness, R lung pneumonia   Cognitive Concerns/ Orientation : A&O x 4   BEHAVIOR & AGGRESSION TOOL COLOR: GREEN  ABNL VS/O2: VSS on RA  MOBILITY: A x 1, gb/w, T&R q2h  PAIN MANAGMENT: C/O gtube site pain managed with PRN tylenol x 1  DIET: NPO, CTF 50mL/h with 140mL flush q4h  BOWEL/BLADDER: Incont. B/B  ABNL LAB/BG: Procal, 0.32- MD aware  DRAIN/DEVICES: Gtube, L PIV SL  SKIN: CDI. Gtube site, Lap site x 2  Procedures: Chest CT done today at 1200- results pending   D/C DAY/GOALS/PLACE: TBD

## 2022-08-02 NOTE — PROVIDER NOTIFICATION
MD Notification    Notified Person: MD    Notified Person Name: Carlita    Notification Date/Time:08/02/22 9:03 AM    Notification Interaction: Vocera    Purpose of Notification: FYI Procalcitonin 0.32 this AM    Orders Received: Awaiting reply    Comments:

## 2022-08-02 NOTE — PROGRESS NOTES
[facility]    Medicine Progress Note - Hospitalist Service    Date of Admission:  7/21/2022    Assessment & Plan          Efrem Ramos is a 77 year old male who was admitted on 7/21/2022 for aspiration pneumonia.     Aspiration  pneumonia with history of dysphagia  Pulmonary fibrosis/ILD on CT  -Patient presented to hospital with asymptomatic hypotension which resolved prior to arrival.   -found to have new groundglass opacities on CT scan of the chest was suggestive of atypical multifocal pneumonia although patient does have background fibrosis and interstitial lung disease.    -SARS COVID-negative.    -Patient denying any cough, so unable to obtain sputum sample.  Patient is supposed to be on a puréed diet due to his dysphagia however he has been eating solid foods.  He denies any episodes of aspiration.  -Blood cultures no growth on 7/21.  -Completed 7 days of antibiotics on 7/29  -HRCT today      New fever  -Patient with a temperature of 100.6 this morning  -Also has mildly elevated lactate  -High risk for infection given multiple medical comorbidities although just completed antibiotic course on 7/29 with cefuroxime and doxycycline.  No further fever, no sign of infection and blood cultures NGTD. Pro-calcitonin was negative but up a little today- no fever.  WBC same today(13K)  -Continue to monitor, hold of on antibiotics- CT as above     Severe malnutrition with history of dysphagia  -Patient has not been eating much because of dislike to the puréed diet with his history of dysphagia.  -Continue tube feeds for now, as below plan for G-tube placement.  -G-tube was planned for today however IR unable to do the procedure due to technical difficulty.  -General surgery consulted; underwent G-tube placement on 7/28.  -Continue tube feeding through G-tube      Hypotension with chronic low blood pressures at baseline in the 80s  -Clinic visits indicates blood pressure is 80 over 50s  -Has  had intermittent lows although blood pressure fairly stable in the 90s-100s at the moment.  -Monitor closely      Afib/flutter  Severe aortic stenosis  HTN  HLD  PVD  CAD   -Continue PTA atorvastatin, digoxin.  -Noted pharmacy comments regarding digoxin levels. Recommend rechecking digoxin level as outpatient in a couple of weeks and adjusting accordingly.  For the moment he is therapeutic and marginally above desired range so I would not suggest making any changes.  -Echo done during this hospitalization showed EF of 60 to 65%. Severe low-flow low gradient aortic valve stenosis with mean gradient of 23 to 25 mmHg.  Valve area 0.75 cm  severe tricuspid valve regurgitation.  -Evaluated by Dr. Cortes on 7/22.  Please review his notes.  He feels TAVR also likely will be difficult although plan is to follow-up outpatient after he has recovered from this pneumonia.  -Continue Eliquis and Plavix      Hx of CVA  -Plavix and Eliquis      Hypokalemia  Hypomagnesemia and hypophosphatemia  -Electrolytes replacement protocol     MEL  -CPAP     Hx of Acute disseminated encephalomyelitis  -Has residual right-sided weakness     MGUS  Follows with oncology. Noted.     Diarrhea  -Most likely antibiotic and/or tube feed related  -C. difficile negative  -Symptomatic treatment     Anemia chronic disease  -Probably this is multifactorial, patient has multiple medical comorbidities that could cause anemia  -Recent baseline has been between 8-10, likely at baseline  -Monitor intermittently.     Diet: Adult Formula Drip Feeding: Continuous Jevity 1.5; Other - Specify in Comment; Goal Rate: 50; mL/hr; Medication - Feeding Tube Flush Frequency: At least 15-30 mL water before and after medication administration and with tube clogging; Amount to Se...    DVT Prophylaxis: DOAC  Bravo Catheter: Not present  Central Lines: None  Cardiac Monitoring: None  Code Status: Full Code      Disposition Plan      Expected Discharge Date: 08/03/2022     Discharge Delays: Placement - TCU  Lab Result Pending (enter specific test & time in comments)            The patient's care was discussed with the Bedside Nurse and Patient.    Aidan Zazueta MD  Hospitalist Service  [facility]  Securely message with the Vocera Web Console (learn more here)  Text page via [employer] Paging/Directory         Clinically Significant Risk Factors Present on Admission                      ______________________________________________________________________    Interval History   Some cough. No chest pain or shortness of breath.    Data reviewed today: I reviewed all medications, new labs and imaging results over the last 24 hours. I personally reviewed no images or EKG's today.    Physical Exam   Vital Signs: Temp: 97.8  F (36.6  C) Temp src: Oral BP: 101/66 Pulse: 78   Resp: 16 SpO2: 98 % O2 Device: None (Room air)    Weight: 115 lbs 15.39 oz  Constitutional: awake, alert, cooperative, no apparent distress  Respiratory: No increased work of breathing, some rhonchi no wheezing  Cardiovascular: regular rate and rhythm, normal S1 and S2, no S3 or S4, and no murmur noted  GI: normal bowel sounds, soft, non-distended, non-tender, no masses palpated. PEG site intact  Skin: no rashes and no jaundice  Neuropsychiatric: General: normal, calm and normal eye contact    Data   Recent Labs   Lab 08/02/22  0740 08/01/22  0801 08/01/22  0748 08/01/22  0314 07/31/22  2217 07/31/22  1704 07/31/22  1410 07/30/22  1704 07/30/22  0713   WBC 13.2*  --  13.1*  --   --   --  17.3*  --   --    HGB 9.8*  --  10.2*  --   --   --  9.9*  --   --    MCV 98  --  100  --   --   --  98  --   --      --  384  --   --   --  371  --   --    NA  --   --  142  --   --   --   --   --  142   POTASSIUM 3.5  --  3.6 3.8  --    < > 3.1*   < > 3.5   CHLORIDE  --   --  114*  --   --   --   --   --   --    CO2  --   --  26  --   --   --   --   --   --    BUN  --   --  18  --   --   --   --    --   --    CR  --   --  0.59*  --   --   --   --   --   --    ANIONGAP  --   --  2*  --   --   --   --   --   --    ULISSES  --   --  8.5  --   --   --   --   --   --    GLC  --  86 89  --  103*   < >  --    < >  --     < > = values in this interval not displayed.     No results found for this or any previous visit (from the past 24 hour(s)).  Medications     dextrose       - MEDICATION INSTRUCTIONS -         apixaban ANTICOAGULANT  5 mg Oral or Feeding Tube BID     atorvastatin  40 mg Oral or NG Tube QPM     clopidogrel  75 mg Oral or Feeding Tube Daily     digoxin  125 mcg Oral Daily     gabapentin  300 mg Oral or Feeding Tube QAM     gabapentin  600 mg Oral or Feeding Tube At Bedtime     mirtazapine  15 mg Oral or Feeding Tube At Bedtime     sodium chloride (PF)  3 mL Intracatheter Q8H

## 2022-08-02 NOTE — PROGRESS NOTES
Patient is A&O x 4, up with assist of 1, GB and walker. NPO. On PRN Tylenol for pain vie G tube. Tube Feeding running at 50 mL/hr with free water flush at 140 mL every 4 hours as order. Patient incontinent with urine x 2. Was turned and reposition for comfort during the shift. Moisture related redness and rashes remain to vivek area. Area cleaned. Barrier paste applied to area. Will continue to monitor.

## 2022-08-02 NOTE — PROVIDER NOTIFICATION
MD Notification    Notified Person: MD    Notified Person Name: Dr. Ozzie Zazueta    Notification Date/Time: 8/2/2022 @ 1548    Notification Interaction: Servergy Messaging    Purpose of Notification: Hi, nurse taking over for pt in 2325. Just an fyi, sepsis protocol fired after recent vital signs were taken. BP initially 78/53, HR 94, T 98. Rechecked again BP 86/57 P 97 T 98.0. I know his BP usually runs lower.    Orders Received: Initial orders received to give IV bolus. Writer notified provider that patient had received IV bolus in past, pt became hypoxic w/ increased SOB, IV fluids stopped, IV lasix given.     Comments: Per Dr. Zazueta; do not give fluids, continue to monitor pt at this time.

## 2022-08-03 NOTE — PROGRESS NOTES
[facility]    Medicine Progress Note - Hospitalist Service    Date of Admission:  7/21/2022    Assessment & Plan      Efrem Ramos is a 77 year old male who was admitted on 7/21/2022 for aspiration pneumonia. CT and NRCT show UIP and infiltrates.      Aspiration pneumonia with history of dysphagia  Pulmonary fibrosis/ILD on CT  Patient presented to hospital with asymptomatic hypotension which resolved prior to arrival.   Found to have new groundglass opacities on CT scan of the chest was suggestive of atypical multifocal pneumonia although patient does have background fibrosis and interstitial lung disease.    SARS COVID-negative.    Patient denying any cough, so unable to obtain sputum sample.   Patient is supposed to be on a puréed diet due to his dysphagia however he has been eating solid foods.  He denies any episodes of aspiration.  Completed 7 days of antibiotics on 7/29  HRCT shows UIP and a right lower lobe infiltrate.  He is now afebrile with a negative pro-calcitonin, decreasing C-reactive protein but persistent leukocytosis.  I suspect that the right lower lobe infiltrate was already treated.      Will ask pulmonologist's opinion on the UIP and infiltrate      New fever  Now resolved, blood cultures NGTD. Pro-calcitonin now negative and C-reactive protein decreasing. WBC still elevated.     Continue to monitor      Severe malnutrition with history of dysphagia  Patient has not been eating much because of dislike to the puréed diet with his history of dysphagia.    Continue tube feeding through G-tube      Hypotension with chronic low blood pressures at baseline in the 80s  Clinic visits indicates blood pressure is 80 over 50s  Has had intermittent lows although blood pressure fairly stable in the 90s-100s at the moment.    Monitor closely      Afib/flutter  Severe aortic stenosis  HTN  HLD  PVD  CAD     Continue PTA atorvastatin, digoxin.    Noted pharmacy comments regarding  digoxin levels. Recommend rechecking digoxin level as outpatient in a couple of weeks and adjusting accordingly.  For the moment he is therapeutic and marginally above desired range so I would not suggest making any changes.    Echo done during this hospitalization showed EF of 60 to 65%. Severe low-flow low gradient aortic valve stenosis with mean gradient of 23 to 25 mmHg.  Valve area 0.75 cm  severe tricuspid valve regurgitation.    Evaluated by Dr. Cortes on 7/22.  Please review his notes.  He feels TAVR also likely will be difficult although plan is to follow-up outpatient after he has recovered from this pneumonia.    Continue Eliquis and Plavix      Hx of CVA    Plavix and Eliquis      Hypokalemia  Hypomagnesemia and hypophosphatemia    Electrolytes replacement protocol     MEL    CPAP     Hx of acute disseminated encephalomyelitis    Has residual right-sided weakness     MGUS  Follows with oncology. Noted.     Diarrhea  Most likely antibiotic and/or tube feed related  C. difficile negative    Symptomatic treatment     Anemia chronic disease  Probably this is multifactorial, patient has multiple medical comorbidities that could cause anemia  Recent baseline has been between 8-10, likely at baseline    Monitor intermittently     Diet: Adult Formula Drip Feeding: Continuous Jevity 1.5; Other - Specify in Comment; Goal Rate: 50; mL/hr; Medication - Feeding Tube Flush Frequency: At least 15-30 mL water before and after medication administration and with tube clogging; Amount to Se...    DVT Prophylaxis: DOAC  Bravo Catheter: Not present  Central Lines: None  Cardiac Monitoring: None  Code Status: Full Code      Disposition Plan      Expected Discharge Date: 08/04/2022,  3:00 PM  Discharge Delays: Placement - TCU  Lab Result Pending (enter specific test & time in comments)            The patient's care was discussed with the patient .    Aidan Zazueta MD  Hospitalist Service  [facility]  Securely message with the Vocera Web Console (learn more here)  Text page via [employer] Paging/Directory         Clinically Significant Risk Factors Present on Admission                      ______________________________________________________________________    Interval History   He feels generally unwell.  No specific complaints. Difficulty coughing up sputum/secretions.     Data reviewed today: I reviewed all medications, new labs and imaging results over the last 24 hours. I personally reviewed no images or EKG's today.    Physical Exam   Vital Signs: Temp: 97.8  F (36.6  C) Temp src: Axillary BP: (!) 87/69 Pulse: 100   Resp: 16 SpO2: 98 % O2 Device: None (Room air)    Weight: 115 lbs 15.39 oz  Constitutional: awake, alert, cooperative, no apparent distress  Respiratory: No increased work of breathing, some rhonchi no wheezing  Cardiovascular: regular rate and rhythm, normal S1 and S2, no S3 or S4, and no murmur noted  GI: normal bowel sounds, soft, non-distended, non-tender, no masses palpated. PEG site intact  Skin: no rashes and no jaundice  Neuropsychiatric: General: normal, calm and normal eye contact    Data   Recent Labs   Lab 08/03/22  0803 08/02/22  0740 08/01/22  0801 08/01/22  0748 07/31/22  1704 07/31/22  1410 07/30/22  1704 07/30/22  0713   WBC 15.5* 13.2*  --  13.1*  --  17.3*   < >  --    HGB  --  9.8*  --  10.2*  --  9.9*  --   --    MCV  --  98  --  100  --  98  --   --    PLT  --  382  --  384  --  371  --   --      --   --  142  --   --   --  142   POTASSIUM 3.0* 3.5  --  3.6   < > 3.1*   < > 3.5   CHLORIDE 106  --   --  114*  --   --   --   --    CO2 29  --   --  26  --   --   --   --    BUN 13  --   --  18  --   --   --   --    CR 0.57*  --   --  0.59*  --   --   --   --    ANIONGAP 3  --   --  2*  --   --   --   --    ULISSES 8.2*  --   --  8.5  --   --   --   --    GLC 92  --  86 89   < >  --    < >  --     < > = values in this interval not displayed.     No results found for  this or any previous visit (from the past 24 hour(s)).  Medications     dextrose       - MEDICATION INSTRUCTIONS -         sodium chloride 0.9%  500 mL Intravenous Once     apixaban ANTICOAGULANT  5 mg Oral or Feeding Tube BID     atorvastatin  40 mg Oral or NG Tube QPM     clopidogrel  75 mg Oral or Feeding Tube Daily     digoxin  125 mcg Oral Daily     gabapentin  300 mg Oral or Feeding Tube QAM     gabapentin  600 mg Oral or Feeding Tube At Bedtime     ipratropium - albuterol 0.5 mg/2.5 mg/3 mL  3 mL Nebulization Once     mirtazapine  15 mg Oral or Feeding Tube At Bedtime     potassium chloride  20 mEq Oral or Feeding Tube Once     sodium chloride (PF)  3 mL Intracatheter Q8H

## 2022-08-03 NOTE — PROGRESS NOTES
A/O x4. VSS on RA.  Denied pain. Turned and repositioned. Incontinent of bowel and bladder. On continuous tube feeding, with free water flushes. On NPO  HOB maintained at 30 degree elevation.  Discharge pending TCU placement. Will continue to monitor.

## 2022-08-03 NOTE — PLAN OF CARE
A&Ox4. Hypotensive at baseline. Afebrile. Rm Air. Infrequent productive cough. Denies SOB. Turn & repo. Incontinent of B/B. G tube w/ continuous TF. Sepsis protocol d/t hypotensive; VSS. Lactic acid WNL. Continue to monitor.

## 2022-08-04 NOTE — PROGRESS NOTES
Care Management Follow Up    Length of Stay (days): 14    Expected Discharge Date: 08/05/2022  Concerns to be Addressed:       Patient plan of care discussed at interdisciplinary rounds: Yes    Anticipated Discharge Disposition: Skilled Nursing Facility     Anticipated Discharge Services: None  Anticipated Discharge DME: None    Patient/family educated on Medicare website which has current facility and service quality ratings: no  Education Provided on the Discharge Plan:    Patient/Family in Agreement with the Plan: yes    Referrals Placed by CM/SW:    Private pay costs discussed: Not applicable    Additional Information:  IVIS spoke to Edwin from [facility] who stated patient was denied to  of alcohol abuse and ng tube. IVIS informed him patient has a peg. IVIS called patients son Efrem to get more TCU choices and informed him why [facility] declined. Son stated that patient does not have an alcohol abuse problem and hasn't drank in years..other then one beer on his birthday. Efrem asked IVIS to re fax referral to [facility]. IVIS called [facility] and [facility] is currently reviewing again.    Corrina Toribio, MSW, LGSW

## 2022-08-04 NOTE — PROGRESS NOTES
"CLINICAL NUTRITION SERVICES - REASSESSMENT NOTE      Malnutrition:   % Weight Loss:  > 7.5% in 3 months (severe malnutrition) - per 7/22 RD note  % Intake:  </= 50% for >/= 1 month (severe malnutrition) - per 7/22 RD note  Subcutaneous Fat Loss:  Orbital region severe depletion and Upper arm region severe depletion  Muscle Loss:  Temporal region severe depletion, Clavicle bone region severe depletion, Acromion bone region severe depletion, Dorsal hand region severe depletion, Patellar region moderate depletion, Anterior thigh region moderate depletion and Posterior calf region moderate depletion - some may be age-related  Fluid Retention:  None noted    Malnutrition Diagnosis: Severe malnutrition  In Context of:  Chronic illness or disease       EVALUATION OF PROGRESS TOWARD GOALS   Diet: NPO    Nutrition Support: Enteral  Type of Feeding Tube: Lap G-tube   Enteral Frequency:  Continuous  Enteral Regimen: Jevity 1.5 at 50 mL/hr  Total Enteral Provisions: 1800 kcal (35 kcal/kg), 76 g protein (1.5 g/kg), 912 mL H2O, 258 g CHO, 25 g fiber   Free Water Flush: 140 mL every 4 hours   Total Fluid:  1752 mL (34 mL/kg)     - no documented interruptions to TF over past 3 days    - 7/30: TF @ goal   - 7/29: TF started   - 7/28: G-tube placed     Intake/Tolerance:   - per discussion with pt, pt stated \"what a relief to be getting some nourishment.\" pt reported no concerns with TF and that he is tolerating it well. No N/V though he did report diarrhea which is \"not new.\"  - per provider note, continued diarrhea \"Most likely antibiotic and/or tube feed related\"    ASSESSED NUTRITION NEEDS:  Dosing Weight 52 kg (actual, 7/26)  Estimated Energy Needs: 3379-1373 kcals (35-40 Kcal/Kg)  Justification: repletion and underweight  Estimated Protein Needs: 65-94 grams protein (1.2-1.8 g pro/Kg)  Justification: Repletion  Estimated Fluid Needs: 1 mL/kcal or per MD     NEW FINDINGS:   General: discharge pending TCU placement    Weight: " generally stable wt since admit    Labs: creatinine 0.48 (L), BGM     Medications: remeron    GI: last BM x2 today so far, x3 yesterday    Skin: WOC following, 8/1:   Wound location: buttock   Wound due to: Moisture Associated Skin Damage (MASD)  Wound history/plan of care: found with barrier cream at bedside       Previous Goals:   TF regimen will continue to meet % needs  Evaluation: Met    Previous Nutrition Diagnosis:   No nutrition diagnosis identified at this time  Evaluation: No change      MALNUTRITION  % Weight Loss:  > 7.5% in 3 months (severe malnutrition) - per 7/22 RD note  % Intake:  </= 50% for >/= 1 month (severe malnutrition) - per 7/22 RD note  Subcutaneous Fat Loss:  Orbital region severe depletion and Upper arm region severe depletion  Muscle Loss:  Temporal region severe depletion, Clavicle bone region severe depletion, Acromion bone region severe depletion, Dorsal hand region severe depletion, Patellar region moderate depletion, Anterior thigh region moderate depletion and Posterior calf region moderate depletion - some may be age-related  Fluid Retention:  None noted    Malnutrition Diagnosis: Severe malnutrition  In Context of:  Chronic illness or disease    CURRENT NUTRITION DIAGNOSIS  No nutrition diagnosis identified at this time- pt tolerating TF which is meeting 100% of estimated nutritionneeds    INTERVENTIONS  Recommendations / Nutrition Prescription  - none new at this time    Implementation  - none new at this time    Goals  TF regimen will continue to meet % needs    MONITORING AND EVALUATION:  Progress towards goals will be monitored and evaluated per protocol and Practice Guidelines      Stacy Moulton, RD, LD

## 2022-08-04 NOTE — TELEPHONE ENCOUNTER
Called to confirm injection appointment scheduled for 8/5/22.    Spoke to patient's son, Alfredo.  Alfredo stated his father is currently IP and would like to reschedule the injection. Writer informed son, that patient would need to call and reschedule appointment.    Will route to RN pool to review.     Angelica Sadler [facility]   [facility] [place]

## 2022-08-04 NOTE — PLAN OF CARE
Goal Outcome Evaluation  patient more lethargic and much less motivated to progress today. Incontinent of bowel and bladder x3. No open wounds to coccyx or thoracic spine. HOB 30 degrees. Acapella given and instructed but patient not able to effectively use it. Loose productive cough. TF continues at 50 with 140 water flushes. Turns well with 1 assist. Anticipating discharge soon.

## 2022-08-04 NOTE — TELEPHONE ENCOUNTER
Noted by nursing. Plan for r/s if pt condition warrants/improves and family wishes to call and r/s     Nava GREEN RN Care Coordinator  [facility]

## 2022-08-04 NOTE — CONSULTS
Pulmonary Medicine Consultation        Date of Admission: 7/21/2022  Primary Attending:  Ruel Zavaleta MD  Consulting Physician: Jasson Bonilla MD      History:    Alfredo is a 77-year-old male patient with past medical history of Atrial fibrillation, atrial flutter, hypertension, vocal cord cancer, chronic aspiration, carpal tunnel syndrome, coronary artery disease, stroke, chronic encephalopathy, obstructive sleep apnea, known rheumatic mitral valve disease, hyperlipidemia,admitted July 21, 2022 with  asymptomatic hypotension which resolved prior to arrival. Found to have new groundglass opacities on CT scan of the chest was suggestive of atypical multifocal pneumonia although patient does have background fibrosis and interstitial lung disease.  Patient denying any cough, so unable to obtain sputum sample.   Patient is supposed to be on a puréed diet due to his dysphagia however he has been eating solid foods.  He denies any episodes of aspiration. Completed 7 days of antibiotics on 7/29  negative pro-calcitonin, decreasing C-reactive protein but persistent leukocytosis.  consulted for recommendations in the management of his abnormal chest CT.      Review of system:   ROS is negative except for items mentioned above and in HPI.           Prior medical history:  Past Medical History:   Diagnosis Date     Atrial fibrillation (H)     amiodarone therapy discontinued due to pulmonary toxicity     Atrial flutter (H)      Benign essential hypertension 11/20/2018     Cancer (H) vocal cord     Carpal tunnel syndrome     abstracted 7/3/02.     Coronary artery disease involving native coronary artery of native heart without angina pectoris 05/08/2020    Cath 5/28/2020: patent stents; Cath 5/18/2020: ISABEL x4 to RCA; Cath 3/2020: 1 vessel disease; Cath 2017: moderate 2 vessel disease     CVA (cerebral infarction) 05/05/2015     Demyelinating disease of central nervous system, unspecified (H)     abstracted 7/3/02. SOFIA  - follows in neurology     Dyspnea      ENCEPHALOPATHY UNSPECIFIED  03/15/2005    acute diseminated encephalitis     Femoral artery hematoma complicating cardiac catheterization     5/18/2020     History of thrombophlebitis      Mitral valve problem 08/18/2013    TRANSTHORACIC ECHOCARDIOGRAM 08/2013 There is a linear strand like projection seen in the LV cavity in diastole that I suspect is the posterior mitral leaflet although I cannot exclude a torn chordae or small vegetation       Mixed hyperlipidemia 03/15/2005     Nonrheumatic mitral valve stenosis      MEL (obstructive sleep apnea)      Other and unspecified noninfectious gastroenteritis and colitis(558.9)     abstracted 7/3/02.     Pneumonia 08/17/2016     PVD (peripheral vascular disease) (H)      Redundant colon     needs CT colonography     Shingles      SKIN DISORDERS NEC 03/15/2005     Sleep apnea      Sleep apnea      SVT (supraventricular tachycardia) (H)        Past Surgical History:   Procedure Laterality Date     BIOPSY  brain 2002     BONE MARROW BIOPSY, BONE SPECIMEN, NEEDLE/TROCAR N/A 6/8/2017    Procedure: BIOPSY BONE MARROW;  UNILATERAL BONE MARROW BIOPSY (CONSCIOUS SEDATION) ;  Surgeon: Jamie Gonzales MD;  Location:  GI     BONE MARROW BIOPSY, BONE SPECIMEN, NEEDLE/TROCAR N/A 2/23/2022    Procedure: BIOPSY, BONE MARROW;  Surgeon: Carmelita Aguilera MD;  Location:  GI     CARDIAC CATHERIZATION  09/05/2017    2V CAD, IFR of RCA 0.95     CV CORONARY ANGIOGRAM N/A 3/23/2020    Procedure: Coronary Angiogram and right heart cath;  Surgeon: Gino Ferrer MD;  Location:  HEART CARDIAC CATH LAB     CV HEART CATHETERIZATION WITH POSSIBLE INTERVENTION N/A 5/28/2020    Procedure: Heart Catheterization with Possible Intervention;  Surgeon: Larry Chawla MD;  Location:  HEART CARDIAC CATH LAB     CV HEART CATHETERIZATION WITH POSSIBLE INTERVENTION N/A 5/18/2020    Procedure: Heart Catheterization with Possible Intervention;   Surgeon: Gaurang Swenson MD;  Location:  HEART CARDIAC CATH LAB     CV INSTANTANEOUS WAVE-FREE RATIO N/A 3/23/2020    Procedure: Instantaneous Wave-Free Ratio;  Surgeon: Gino Ferrer MD;  Location:  HEART CARDIAC CATH LAB     CV PCI ATHERECTOMY ORBITAL N/A 5/18/2020    Procedure: Percutaneous Coronary Intervention Atherectomy Rotational;  Surgeon: Gaurang Swenson MD;  Location:  HEART CARDIAC CATH LAB     CV PCI STENT DRUG ELUTING N/A 5/18/2020    Procedure: Percutaneous Coronary Intervention Stent Drug Eluting;  Surgeon: Gaurang Swenson MD;  Location:  HEART CARDIAC CATH LAB     CV RIGHT HEART CATH MEASUREMENTS RECORDED N/A 5/28/2020    Procedure: Right Heart Cath;  Surgeon: Larry Chawla MD;  Location:  HEART CARDIAC CATH LAB     CV TEMPORARY PACEMAKER INSERTION N/A 5/18/2020    Procedure: Temporary Pacemaker Insertion;  Surgeon: Gaurang Swenson MD;  Location:  HEART CARDIAC CATH LAB     ESOPHAGOSCOPY, GASTROSCOPY, DUODENOSCOPY (EGD), COMBINED N/A 9/8/2018    Procedure: COMBINED ESOPHAGOSCOPY, GASTROSCOPY, DUODENOSCOPY (EGD), BIOPSY SINGLE OR MULTIPLE;;  Surgeon: Xander Marie MD;  Location:  GI     HEAD & NECK SURGERY       IR GASTROSTOMY TUBE PERCUTANEOUS PLCMNT  7/27/2022     LAPAROSCOPIC ASSISTED INSERTION TUBE GASTROTOMY N/A 7/28/2022    Procedure: LAPAROSCOPIC ASSISTED GASTROSTOMY TUBE PLACEMENT;  Surgeon: Vicente Weber MD;  Location:  OR     ORTHOPEDIC SURGERY      right arm ulna reset after injury     THORACOSCOPIC WEDGE RESECTION LUNG Right 8/2/2016    Procedure: THORACOSCOPIC WEDGE RESECTION LUNG;  Surgeon: Abdelrahman Noriega MD;  Location:  OR     [facility] NONSPECIFIC PROCEDURE  as a child    T & A. abstracted 7/3/02.     ZZC NONSPECIFIC PROCEDURE  early    CTR. abstracted 7/3/02.       Patient Active Problem List   Diagnosis     Encephalopathy     Mixed hyperlipidemia     Other specified disorder of skin     MEL (obstructive  sleep apnea)     PVD (peripheral vascular disease) (H)     Decreased diffusion capacity     Anemia     Vocal cord cancer (H)     Lower extremity edema     Lymphocytic colitis     IgG monoclonal gammopathy     CARDIOVASCULAR SCREENING; LDL GOAL LESS THAN 100     Vitamin B12 deficiency disease     Elevated ferritin     Collagenous colitis     Redundant colon     Rash and nonspecific skin eruption     Cerebral infarction (H)     Alcohol abuse     Health Care Home     Atrial fibrillation (H)     Hypoxia     Acute respiratory failure with hypoxia (H)     Physical deconditioning     Urinary retention     Community acquired pneumonia     Constipation     Pneumonia     Hypokalemia     Non-traumatic compression fracture of L1 lumbar vertebra with routine healing, subsequent encounter     Age-related osteoporosis with current pathological fracture with routine healing, subsequent encounter     Benign essential hypertension     Other secondary pulmonary hypertension (H)     Edema due to malnutrition, due to unspecified malnutrition type (H)     Abnormal findings on diagnostic imaging of heart and coronary circulation     Coronary artery disease involving native coronary artery of native heart without angina pectoris     Bleeding     Severe aortic stenosis     Cardiogenic shock (H)     Atrial flutter (H)     SVT (supraventricular tachycardia) (H)     Nonrheumatic mitral valve stenosis     Alcohol dependence (H)     Weakness     Abnormal chest x-ray     Leukocytosis, unspecified type     Chronic obstructive pulmonary disease, unspecified COPD type (H)       Social History     Social History     Socioeconomic History     Marital status:      Spouse name: Not on file     Number of children: Not on file     Years of education: Not on file     Highest education level: Not on file   Occupational History     Not on file   Tobacco Use     Smoking status: Former Smoker     Packs/day: 1.00     Years: 30.00     Pack years: 30.00      Types: Cigarettes     Quit date: 2013     Years since quittin.0     Smokeless tobacco: Never Used   Substance and Sexual Activity     Alcohol use: Not Currently     Alcohol/week: 42.0 standard drinks     Types: 42 Standard drinks or equivalent per week     Drug use: No     Sexual activity: Not Currently   Other Topics Concern     Parent/sibling w/ CABG, MI or angioplasty before 65F 55M? Not Asked      Service Not Asked     Blood Transfusions Not Asked     Caffeine Concern Yes     Comment: 1 Coke occasionally      Occupational Exposure Not Asked     Hobby Hazards Not Asked     Sleep Concern Not Asked     Stress Concern Not Asked     Weight Concern Not Asked     Special Diet No     Back Care Not Asked     Exercise No     Bike Helmet Not Asked     Seat Belt Not Asked     Self-Exams Not Asked   Social History Narrative    Retired (disability)      Social Determinants of Health     Financial Resource Strain: Not on file   Food Insecurity: Not on file   Transportation Needs: Not on file   Physical Activity: Not on file   Stress: Not on file   Social Connections: Not on file   Intimate Partner Violence: Not on file   Housing Stability: Not on file         Family History  Family History   Problem Relation Age of Onset     Blood Disease Mother         Anemia     Cardiovascular Father      Cancer - colorectal Maternal Grandfather      Hypertension Brother      Diabetes Sister 5        Juvinile Diabetes passed at 36     Respiratory Other         Lung Cancer           Medications  Current Outpatient Medications   Medication Sig Dispense Refill     oxyCODONE (ROXICODONE) 5 MG/5ML solution 5 mLs (5 mg) by Per G Tube route every 4 hours as needed for moderate to severe pain 45 mL 0     Current Facility-Administered Medications Ordered in Epic   Medication Dose Route Frequency Last Rate Last Admin     0.9% sodium chloride BOLUS  500 mL Intravenous Once         acetaminophen (TYLENOL) solution 650 mg   650 mg Oral Q6H PRN   650 mg at 08/02/22 1405    Or     acetaminophen (TYLENOL) Suppository 650 mg  650 mg Rectal Q6H PRN         apixaban ANTICOAGULANT (ELIQUIS) tablet 5 mg  5 mg Oral or Feeding Tube BID   5 mg at 08/04/22 0906     atorvastatin (LIPITOR) tablet 40 mg  40 mg Oral or NG Tube QPM   40 mg at 08/03/22 2036     clopidogrel (PLAVIX) tablet 75 mg  75 mg Oral or Feeding Tube Daily   75 mg at 08/04/22 0907     dextrose 10% infusion   Intravenous Continuous PRN         digoxin (LANOXIN) tablet 125 mcg  125 mcg Oral Daily   125 mcg at 08/03/22 0926     gabapentin (NEURONTIN) solution 300 mg  300 mg Oral or Feeding Tube QAM   300 mg at 08/04/22 0917     gabapentin (NEURONTIN) solution 600 mg  600 mg Oral or Feeding Tube At Bedtime   600 mg at 08/03/22 2036     HYDROmorphone (DILAUDID) injection 0.2-0.4 mg  0.2-0.4 mg Intravenous Q2H PRN   0.2 mg at 08/01/22 1532     ipratropium - albuterol 0.5 mg/2.5 mg/3 mL (DUONEB) neb solution 3 mL  3 mL Nebulization Q4H PRN         ipratropium - albuterol 0.5 mg/2.5 mg/3 mL (DUONEB) neb solution 3 mL  3 mL Nebulization Once         lidocaine (LMX4) cream   Topical Q1H PRN         lidocaine 1 % 0.1-1 mL  0.1-1 mL Other Q1H PRN         loperamide (IMODIUM) capsule 2 mg  2 mg Oral 4x Daily PRN   2 mg at 08/01/22 1210     melatonin tablet 1 mg  1 mg Oral At Bedtime PRN         mirtazapine (REMERON) tablet 15 mg  15 mg Oral or Feeding Tube At Bedtime   15 mg at 08/03/22 2036     naloxone (NARCAN) injection 0.2 mg  0.2 mg Intravenous Q2 Min PRN        Or     naloxone (NARCAN) injection 0.4 mg  0.4 mg Intravenous Q2 Min PRN        Or     naloxone (NARCAN) injection 0.2 mg  0.2 mg Intramuscular Q2 Min PRN        Or     naloxone (NARCAN) injection 0.4 mg  0.4 mg Intramuscular Q2 Min PRN         ondansetron (ZOFRAN ODT) ODT tab 4 mg  4 mg Oral Q6H PRN        Or     ondansetron (ZOFRAN) injection 4 mg  4 mg Intravenous Q6H PRN         oxyCODONE (ROXICODONE) solution 5 mg  5 mg Per G  Tube Q4H PRN         Patient is already receiving anticoagulation with heparin, enoxaparin (LOVENOX), warfarin (COUMADIN)  or other anticoagulant medication   Does not apply Continuous PRN         senna-docusate (SENOKOT-S/PERICOLACE) 8.6-50 MG per tablet 1 tablet  1 tablet Oral BID PRN        Or     senna-docusate (SENOKOT-S/PERICOLACE) 8.6-50 MG per tablet 2 tablet  2 tablet Oral BID PRN         sodium chloride (PF) 0.9% PF flush 3 mL  3 mL Intracatheter Q8H   3 mL at 22 0910     sodium chloride (PF) 0.9% PF flush 3 mL  3 mL Intracatheter q1 min prn   3 mL at 22 2150     Current Outpatient Medications Ordered in Epic   Medication     oxyCODONE (ROXICODONE) 5 MG/5ML solution       Allergies   Allergen Reactions     Sulfa Drugs Difficulty breathing, Swelling and Hives     Adhesive Tape Blisters     Amiodarone Other (See Comments)     Developed pleural effusion     Cephalosporins      Tolerated ceftriaxone      Penicillins Hives     Reaction occurred as a child  Tolerated ceftriaxone in past                Physical Examination:   Vitals:    22 1519 22 0006 22 0730 22 0909   BP: 95/65 102/67 (!) 85/55 (!) 89/59   BP Location: Right arm Right arm Right arm Right arm   Patient Position:    Semi-Chandler's   Cuff Size:    Adult Small   Pulse: 92 106 104 98   Resp: 16 16 16    Temp: 98  F (36.7  C) 98.7  F (37.1  C) 98.6  F (37  C)    TempSrc: Oral Oral Axillary    SpO2: 96% 95% 98%    Weight:         Body mass index is 17.63 kg/m .  Temp (24hrs), Av.3  F (36.8  C), Min:97.8  F (36.6  C), Max:98.7  F (37.1  C)        Constitutional:  Appears comfortable.  HENT:  mucous membranes moist.  Eyes: PERRLA, no icterus, no pallor.   Neck: No lymphadenopathy or thyromegaly, trachea midline, no carotid bruits.  Cardiovascular: Regular rate and rythym, no murmurs, rubs or gallops, no peripheral edema.  Respiratory/Chest: coarse crackles bilaterally  Gastrointestinal: Abdomen was soft,  non-tender, non-distended, no masses felt, no hepatosplenomegaly.  Musculoskeletal: No clubbing or cyanosis, full range of motion in all extremities.  Neurological: No focal motor or sensory deficits.         CMP  Recent Labs   Lab 08/04/22 0217 08/03/22  2105 08/03/22  0803 08/02/22  0740 08/01/22  0801 08/01/22  0748 07/31/22  0845 07/31/22  0652 07/30/22  1704 07/30/22  0713     --  138  --   --  142  --   --   --  142   POTASSIUM 3.8  3.8 3.4 3.0* 3.5  --  3.6   < > 3.3*  --  3.5   CHLORIDE 110*  --  106  --   --  114*  --   --   --   --    CO2 27  --  29  --   --  26  --   --   --   --    ANIONGAP 3  --  3  --   --  2*  --   --   --   --    *  --  92  --  86 89   < >  --    < >  --    BUN 13  --  13  --   --  18  --   --   --   --    CR 0.48*  --  0.57*  --   --  0.59*  --   --   --   --    GFRESTIMATED >90  --  >90  --   --  >90  --   --   --   --    ULISSES 8.0*  --  8.2*  --   --  8.5  --   --   --   --    MAG  --   --   --  1.9  --  2.0  --  2.0  --  2.2   PHOS  --   --   --  4.1  --  2.9  --  2.5  --  3.3    < > = values in this interval not displayed.     CBC  Recent Labs   Lab 08/04/22 0217 08/03/22  0803 08/02/22  0740 08/01/22  0748 07/31/22  1410   WBC 15.9* 15.5* 13.2* 13.1* 17.3*   RBC 3.20*  --  3.16* 3.24* 3.14*   HGB 10.1*  --  9.8* 10.2* 9.9*   HCT 31.4*  --  30.9* 32.5* 30.9*   MCV 98  --  98 100 98   MCH 31.6  --  31.0 31.5 31.5   MCHC 32.2  --  31.7 31.4* 32.0   RDW 16.8*  --  17.2* 17.1* 16.6*     --  382 384 371     INRNo lab results found in last 7 days.  Arterial Blood GasNo lab results found in last 7 days.  No results for input(s): CULT in the last 168 hours.    Diagnostic Studies:  Chest Radiology:    CT CHEST WITHOUT CONTRAST HIGH RESOLUTION August 2, 2022 12:15 PM     HISTORY: Evaluate ILD/fibrosis.     COMPARISON: July 21, 2022.     TECHNIQUE: Volumetric helical acquisition of CT images of the chest  from the clavicles to the kidneys were acquired without IV  contrast.  Images were displayed in high resolution and standard reconstructions.  Expiratory views also obtained. Radiation dose for this scan was  reduced using automated exposure control, adjustment of the mA and/or  kV according to patient size, or iterative reconstruction technique.     FINDINGS: Moderately extensive fibrotic changes in the periphery and  base of both lungs. Superimposed infiltrate suspected in the right  lower lobe. Trace pleural fluid on the right. Trace pleural fluid on  the left. Free air in the upper abdomen compatible with recent  surgery. A few mildly prominent mediastinal nodes are noted which are  nonspecific and may be reactive in this setting. Cardiomegaly. There  are extensive coronary vascular calcifications and/or stents  consistent with coronary artery disease. There are moderate  atherosclerotic changes of the visualized aorta and its branches.  There is no evidence of aortic aneurysm. No frankly destructive bony  lesions. T12 and L1 compression deformity is evident and unchanged  from previous.                                                                      IMPRESSION:  1. Moderately extensive right lower lobe infiltrate.  2. Mild-moderate peripheral and basilar fibrosis most compatible with usual interstitial pneumonitis.             Assessment:     77-year-old male patient with past medical history of Atrial fibrillation, atrial flutter, hypertension, vocal cord cancer, chronic aspiration, carpal tunnel syndrome, coronary artery disease, stroke, chronic encephalopathy, obstructive sleep apnea, known rheumatic mitral valve disease, hyperlipidemia,admitted July 21, 2022 with  asymptomatic hypotension which resolved prior to arrival. Found to have new groundglass opacities on CT scan of the chest was suggestive of atypical multifocal pneumonia although patient does have background fibrosis and interstitial lung disease.  Patient denying any cough, so unable to obtain sputum  sample.   Patient is supposed to be on a puréed diet due to his dysphagia however he has been eating solid foods.  I do not agree that this radiographic patterns suggestive of idiopathic pulmonary fibrosisIn fact the patient had a lung biopsy August 2016 Lung, right middle and lower lobe wedge resections-Mixed acute lung injury including organizing diffuse alveolar damage and organizing pneumonia(no mention of UIP and organizing pneumonia likely secondary to aspiration) +/- old amiodarone toxicity .      Pulmonary Diagnoses: Abnl CT/CXR R91.8, Cough R05, Interstitial pneum J84.111, Pneum aspiration J69.0 and Pnemonia unspec J18.9      Recommendations        Moderately extensive right lower lobe infiltrate.  I do not agree that this is compatible with usual interstitial pneumonitis, as was described by radiology.    UIP is a pathologic, not a radiographic diagnosis. And in order to describe IPF clear subpleural fibrosis should be seen and described.  Mild background fibrosis can also be because of old history of amiodarone toxicity.  In the setting of a patient with a saturation of 98-99% on room air these findings are likely inconsequential.  Unclear seven-day course of antibiotics is sufficient for a patient with chronic aspiration if febrile we'll recommend extending at another 7 days.  An covering for mouth anaerobes  In fact the patient had a lung biopsy August 2016 Lung, right middle and lower lobe wedge resections-Mixed acute lung injury including organizing diffuse alveolar damage and organizing pneumonia(no mention of UIP and organizing pneumonia likely secondary to aspiration) .   Findings are more consistent with chronic aspiration plus minus infectious pneumonia  Continue aspiration precautions  Complete antibiotic course  Significant deconditioning PT OT recommended  The patient is in room air and saturating 98%  Repeat CT in 6-8 weeks with PCP follow-up  Encourage incentive spirometer up to chair and  physical activity as tolerated  No further pulmonary recommendations                 Jasson Tineo M.D.  Pulmonary, Critical Care and Sleep Medicine  [facility]   Pager: [phone]  Office:[phone]

## 2022-08-04 NOTE — PROGRESS NOTES
Care Management Follow Up    Length of Stay (days): 14    Expected Discharge Date: 08/05/2022     Concerns to be Addressed:       Patient plan of care discussed at interdisciplinary rounds: Yes    Anticipated Discharge Disposition: Skilled Nursing Facility     Anticipated Discharge Services: None  Anticipated Discharge DME: None    Patient/family educated on Medicare website which has current facility and service quality ratings: no  Education Provided on the Discharge Plan:    Patient/Family in Agreement with the Plan: yes    Referrals Placed by CM/SW:    Private pay costs discussed: Not applicable    Additional Information:  Call received from Anna indicating that patient is accepted to TCU tomorrow. SW to follow up with admissions in the morning for transport time. Call placed to patient's son Efrem to update and he is in agreement. Writer updated patient and will update both tomorrow when timing is known. PAS completed.    PAS-RR    D: Per DHS regulation, SW completed and submitted PAS-RR to [place] [employer] via the Senior LinkAge Line.  PAS-RR confirmation # is : [id number]    I: SW spoke with patient and they are aware a PAS-RR has been submitted.  SW reviewed with patient that they may be contacted for a follow up appointment within 10 days of hospital discharge if their SNF stay is < 30 days.  Contact information for [facility] LinkAge Line was also provided.    A: patient verbalized understanding.    P: Further questions may be directed to [facility] LinkAge Line at #[phone], option #4 for PAS-RR staff.        THEO Durán

## 2022-08-04 NOTE — PROGRESS NOTES
[facility]    Medicine Progress Note - Hospitalist Service    Date of Admission:  7/21/2022    Assessment & Plan               Efrem Ramos is a 77 year old male who was admitted on 7/21/2022 for aspiration pneumonia. CT and NRCT show UIP and infiltrates.      Aspiration pneumonia with history of dysphagia  Pulmonary fibrosis/ILD on CT  Patient presented to hospital with asymptomatic hypotension which resolved prior to arrival.   Found to have new groundglass opacities on CT scan of the chest was suggestive of atypical multifocal pneumonia although patient does have background fibrosis and interstitial lung disease.    SARS COVID-negative.    Patient denying any cough, so unable to obtain sputum sample.   Patient is supposed to be on a puréed diet due to his dysphagia however he has been eating solid foods.  He denies any episodes of aspiration.  Completed 7 days of antibiotics on 7/29  HRCT shows UIP and a right lower lobe infiltrate.  He is now afebrile with a negative pro-calcitonin, decreasing C-reactive protein but persistent leukocytosis.  I suspect that the right lower lobe infiltrate was already treated.      Will ask pulmonologist's opinion on the UIP and infiltrate     Was seen by pulmonology today, appreciate input.  Antibiotics extended for 7 more days.     New fever  Now resolved, blood cultures NGTD. Pro-calcitonin now negative and C-reactive protein decreasing. WBC still elevated.     Continue to monitor      Severe malnutrition with history of dysphagia  Patient has not been eating much because of dislike to the puréed diet with his history of dysphagia.    Continue tube feeding through G-tube      Hypotension with chronic low blood pressures at baseline in the 80s  Clinic visits indicates blood pressure is 80 over 50s  Has had intermittent lows although blood pressure fairly stable in the 90s-100s at the moment.    Monitor closely      Afib/flutter  Severe aortic  stenosis  HTN  HLD  PVD  CAD     Continue PTA atorvastatin, digoxin.    Noted pharmacy comments regarding digoxin levels. Recommend rechecking digoxin level as outpatient in a couple of weeks and adjusting accordingly.  For the moment he is therapeutic and marginally above desired range so I would not suggest making any changes.    Echo done during this hospitalization showed EF of 60 to 65%. Severe low-flow low gradient aortic valve stenosis with mean gradient of 23 to 25 mmHg.  Valve area 0.75 cm  severe tricuspid valve regurgitation.    Evaluated by Dr. Cortes on 7/22.  Please review his notes.  He feels TAVR also likely will be difficult although plan is to follow-up outpatient after he has recovered from this pneumonia.    Continue Eliquis and Plavix      Hx of CVA    Plavix and Eliquis      Hypokalemia  Hypomagnesemia and hypophosphatemia    Electrolytes replacement protocol     MEL    CPAP     Hx of acute disseminated encephalomyelitis    Has residual right-sided weakness     MGUS  Follows with oncology. Noted.     Diarrhea  Most likely antibiotic and/or tube feed related  C. difficile negative    Symptomatic treatment     Anemia chronic disease  Probably this is multifactorial, patient has multiple medical comorbidities that could cause anemia  Recent baseline has been between 8-10, likely at baseline    Monitor intermittently       Diet: Adult Formula Drip Feeding: Continuous Jevity 1.5; Other - Specify in Comment; Goal Rate: 50; mL/hr; Medication - Feeding Tube Flush Frequency: At least 15-30 mL water before and after medication administration and with tube clogging; Amount to Se...  NPO for Medical/Clinical Reasons Except for: Ice Chips    DVT Prophylaxis: DOAC  Bravo Catheter: Not present  Central Lines: None  Cardiac Monitoring: None  Code Status: Full Code      Disposition Plan      Expected Discharge Date: 08/05/2022,  3:00 PM  Discharge Delays: Placement - TCU  Lab Result Pending (enter specific test  & time in comments)            The patient's care was discussed with the Patient.    Ruel Zavaleta MD  Hospitalist Service  [facility]  Securely message with the [employer] Web Console (learn more here)  Text page via [employer] Paging/Directory         Clinically Significant Risk Factors Present on Admission                      ______________________________________________________________________    Interval History     Patient in bed, awake, C/O pain Left Upper quadrant.  Having cough.      Data reviewed today: I reviewed all medications, new labs and imaging results over the last 24 hours. I personally reviewed no images or EKG's today.    Physical Exam   Vital Signs: Temp: 98.6  F (37  C) Temp src: Axillary BP: (!) 89/59 Pulse: 98   Resp: 16 SpO2: 98 % O2 Device: None (Room air)    Weight: 115 lbs 15.39 oz  General Appearance:  no distress  Respiratory: Lungs clear  Cardiovascular: Rate controlled  GI: Nontender, G tube in place.  Skin: No rash  Extremities: No edema      Data   Recent Labs   Lab 08/04/22  0217 08/03/22  2105 08/03/22  0803 08/02/22  0740 08/01/22  0801 08/01/22  0748   WBC 15.9*  --  15.5* 13.2*  --  13.1*   HGB 10.1*  --   --  9.8*  --  10.2*   MCV 98  --   --  98  --  100     --   --  382  --  384     --  138  --   --  142   POTASSIUM 3.8  3.8 3.4 3.0* 3.5  --  3.6   CHLORIDE 110*  --  106  --   --  114*   CO2 27  --  29  --   --  26   BUN 13  --  13  --   --  18   CR 0.48*  --  0.57*  --   --  0.59*   ANIONGAP 3  --  3  --   --  2*   ULISSES 8.0*  --  8.2*  --   --  8.5   *  --  92  --  86 89     No results found for this or any previous visit (from the past 24 hour(s)).  Medications     dextrose       - MEDICATION INSTRUCTIONS -         sodium chloride 0.9%  500 mL Intravenous Once     apixaban ANTICOAGULANT  5 mg Oral or Feeding Tube BID     atorvastatin  40 mg Oral or NG Tube QPM     clopidogrel  75 mg Oral or Feeding Tube Daily     digoxin  125  mcg Oral Daily     gabapentin  300 mg Oral or Feeding Tube QAM     gabapentin  600 mg Oral or Feeding Tube At Bedtime     ipratropium - albuterol 0.5 mg/2.5 mg/3 mL  3 mL Nebulization Once     mirtazapine  15 mg Oral or Feeding Tube At Bedtime     sodium chloride (PF)  3 mL Intracatheter Q8H

## 2022-08-04 NOTE — PLAN OF CARE
Goal Outcome Evaluation:    Plan of Care Reviewed With: patient     Overall Patient Progress: improving    Outcome Evaluation: NPO, TF @ 50 mL and tolerating- meeting 100% of needs    Stacy Moulton RD, LD

## 2022-08-05 NOTE — DISCHARGE SUMMARY
[facility]    Discharge Summary  Hospitalist    Date of Admission:  7/21/2022  Date of Discharge:  8/5/2022  Discharging Provider: Mj Sosa MD, MD    Discharge Diagnoses      Aspiration  pneumonia with history of dysphagia  Abnormal CT chest; pulmonary fibrosis versus chronic aspiration.  Severe malnutrition with history of dysphagia.  Chronic hypertension.   Afib/flutter  Severe aortic stenosis  HTN  HLD  PVD  CAD   History of CVA  Hypokalemia  Hypomagnesemia and hypophosphatemia  MEL  Hx of Acute disseminated encephalomyelitis  MGUS  Diarrhea  Anemia chronic disease    Hospital Course:    Efrem Ramos is a 77 year old male who was admitted on 7/21/2022 for aspiration pneumonia.     Aspiration  pneumonia with history of dysphagia  Pulmonary fibrosis  -Patient presented to hospital with asymptomatic hypotension which resolved prior to arrival.   -found to have new groundglass opacities on CT scan of the chest was suggestive of atypical multifocal pneumonia although patient does have background fibrosis and interstitial lung disease.    -Blood cultures no growth  -Completed 7 days of antibiotics on 7/29;, however had new fever on 7/31.  Pulmonary was consulted.  Looking at the CT they thought this was more likely due to chronic aspiration.  They recommended extending the antibiotic for another week.  Clindamycin started for 1 more week.  Plan is to repeat CT in 6 weeks with primary care provider.     Severe malnutrition with history of dysphagia  -Patient has not been eating much because of dislike to the puréed diet with his history of dysphagia.  -General surgery consulted; underwent G-tube placement on 7/28.  -Continue tube feeding through G-tube      Hypotension with chronic low blood pressures at baseline in the 80s  -Clinic visits indicates blood pressure is 80 over 50s  -Has had intermittent lows although blood pressure fairly stable in the 90s-100s at the moment.  Random  cortisol level was normal.   -Probably this is baseline for him.     Afib/flutter  Severe aortic stenosis  HTN  HLD  PVD  CAD   -Continue PTA atorvastatin, digoxin.  -Noted pharmacy comments regarding digoxin levels. Recommend rechecking digoxin level as outpatient in a couple of weeks and adjusting accordingly.  For the moment he is therapeutic and marginally above desired range so I would not suggest making any changes.  -Echo done during this hospitalization showed EF of 60 to 65%. Severe low-flow low gradient aortic valve stenosis with mean gradient of 23 to 25 mmHg.  Valve area 0.75 cm  severe tricuspid valve regurgitation.  -Evaluated by Dr. Cortes on 7/22.  Please review his notes.  He feels TAVR also likely will be difficult although plan is to follow-up outpatient after he has recovered from this pneumonia.  -Continue Eliquis and Plavix      Hx of CVA  -Continue Plavix and Eliquis      Hypokalemia  Hypomagnesemia and hypophosphatemia  -Electrolytes replacement protocol     MEL  -CPAP     Hx of Acute disseminated encephalomyelitis  -Has residual right-sided weakness     MGUS  Follows with oncology. Noted.     Diarrhea  -Most likely antibiotic and/or tube feed related  -C. difficile negative  -Symptomatic treatment     Anemia chronic disease  -Probably this is multifactorial, patient has multiple medical comorbidities that could cause anemia  -Recent baseline has been between 8-10, likely at baseline  -Monitor intermittently.    Mj Sosa MD, MD    Significant Results and Procedures   See below    Pending Results     Unresulted Labs Ordered in the Past 30 Days of this Admission     Date and Time Order Name Status Description    7/31/2022  1:24 PM Blood Culture Arm, Left Preliminary     7/31/2022  1:24 PM Blood Culture Arm, Right Preliminary           Code Status   Full Code       Primary Care Physician   Danny Paige MD    Physical Exam   Temp: 97.2  F (36.2  C) Temp src: Axillary BP: 93/65  Pulse: 90   Resp: 16 SpO2: 93 % O2 Device: None (Room air)      Constitutional: AAOX3, NAD, frail  Respiratory: CTA B/L, Normal WOB  Cardiovascular: RRR, No murmur  GI: Soft, Non- tender, BS- normoactive  Neuro: CN- grossly intact, globally weak.    Discharge Disposition   Discharged to short-term care facility  Condition at discharge: Stable    Consultations This Hospital Stay   SPEECH LANGUAGE PATH ADULT IP CONSULT  PHYSICAL THERAPY ADULT IP CONSULT  OCCUPATIONAL THERAPY ADULT IP CONSULT  NUTRITION SERVICES ADULT IP CONSULT  SPEECH LANGUAGE PATH ADULT IP CONSULT  CARDIOLOGY IP CONSULT  NUTRITION SERVICES ADULT IP CONSULT  PHARMACY IP CONSULT  PHYSICAL THERAPY ADULT IP CONSULT  CARE MANAGEMENT / SOCIAL WORK IP CONSULT  SURGERY GENERAL IP CONSULT  WOUND OSTOMY CONTINENCE NURSE  IP CONSULT  PULMONARY IP CONSULT  PHYSICAL THERAPY ADULT IP CONSULT  OCCUPATIONAL THERAPY ADULT IP CONSULT    Time Spent on this Encounter   Mj GREEN MD, personally saw the patient today and spent greater than 30 minutes discharging this patient.    Discharge Orders      Follow-Up with Cardiology [facility]      Activity    Please see attached discharge instructions.     Follow-up and recommended labs and tests    - You may follow up with your primary care physician in 2 weeks for the removal of the suture adhering the G tube to the skin. You do not need to follow up with our clinic if you are doing well with tube feeds.  - If you have any questions or concerns, or would like to be seen, please call us at [phone] and ask to speak with our nurse.  We are located at [address] [place] [address].     General info for SNF    Length of Stay Estimate: Short Term Care: Estimated # of Days <30  Condition at Discharge: Improving  Level of care:skilled   Rehabilitation Potential: Excellent  Admission H&P remains valid and up-to-date: Yes  Recent Chemotherapy: N/A  Use Nursing Home Standing Orders: Yes     Mily  instructions    Give two-step Mantoux (PPD) Per Facility Policy Yes     Activity - Up with nursing assistance     Intake and output    Every shift     Daily weights    Call Provider for weight gain of more than 2 pounds per day or 5 pounds per week.     Follow Up and recommended labs and tests    Follow up with Nursing home physician.   Digoxin level and CBC in 1 week     Full Code     Physical Therapy Adult Consult    Evaluate and treat as clinically indicated.    Reason:  Deconditioning     Occupational Therapy Adult Consult    Evaluate and treat as clinically indicated.    Reason:  Deconditioning     Advance Diet as Tolerated    Follow this diet upon discharge: Orders Placed This Encounter      Adult Formula Drip Feeding: Continuous Jevity 1.5; Other - Specify in Comment; Goal Rate: 50; mL/hr; Medication - Feeding Tube Flush Frequency: At least 15-30 mL water before and after medication administration and with tube clogging; Amount to Se...      NPO for Medical/Clinical Reasons Except for: Ice Chips       Discharge Medications   Current Discharge Medication List      START taking these medications    Details   clindamycin (CLEOCIN) 75 MG/5ML solution 30 mLs (450 mg) by Per G Tube route 3 times daily  Qty: 630 mL, Refills: 0    Associated Diagnoses: Pneumonia of right lung due to infectious organism, unspecified part of lung      ipratropium - albuterol 0.5 mg/2.5 mg/3 mL (DUONEB) 0.5-2.5 (3) MG/3ML neb solution Take 1 vial (3 mLs) by nebulization every 4 hours as needed for wheezing or shortness of breath / dyspnea  Qty: 90 mL    Associated Diagnoses: Pneumonia of right lung due to infectious organism, unspecified part of lung      melatonin 1 MG TABS tablet Take 1 tablet (1 mg) by mouth nightly as needed for sleep    Associated Diagnoses: Pneumonia of right lung due to infectious organism, unspecified part of lung      oxyCODONE (ROXICODONE) 5 MG/5ML solution 5 mLs (5 mg) by Per G Tube route every 4 hours as  needed for moderate to severe pain  Qty: 45 mL, Refills: 0    Associated Diagnoses: Postoperative pain         CONTINUE these medications which have NOT CHANGED    Details   acetaminophen (TYLENOL 8 HOUR ARTHRITIS PAIN) 650 MG CR tablet Take 650 mg by mouth 2 times daily      apixaban ANTICOAGULANT (ELIQUIS) 5 MG tablet Take 1 tablet (5 mg) by mouth 2 times daily  Qty: 180 tablet, Refills: 3    Associated Diagnoses: Chronic a-fib (H)      atorvastatin (LIPITOR) 40 MG tablet Take 1 tablet (40 mg) by mouth daily  Qty: 90 tablet, Refills: 1    Associated Diagnoses: Coronary artery disease involving native coronary artery of native heart without angina pectoris      calcium carbonate 600 mg-vitamin D 400 units (CALTRATE) 600-400 MG-UNIT per tablet Take 1 tablet by mouth 2 times daily       clopidogrel (PLAVIX) 75 MG tablet Take 1 tablet (75 mg) by mouth daily  Qty: 90 tablet, Refills: 1    Associated Diagnoses: Coronary artery disease involving native coronary artery of native heart without angina pectoris      digoxin (LANOXIN) 125 MCG tablet Take 1 tablet (125 mcg) by mouth daily  Qty: 90 tablet, Refills: 3    Associated Diagnoses: Chronic a-fib (H)      !! gabapentin (NEURONTIN) 300 MG capsule Take 300 mg by mouth every morning      !! gabapentin (NEURONTIN) 300 MG capsule Take 2 capsules (600 mg) by mouth At Bedtime  Qty: 180 capsule, Refills: 3    Associated Diagnoses: Restless legs syndrome (RLS)      meclizine (ANTIVERT) 25 MG tablet Take 1 tablet (25 mg) by mouth 2 times daily    Associated Diagnoses: Vertigo      mirtazapine (REMERON) 15 MG tablet Take 1 tablet (15 mg) by mouth At Bedtime  Qty: 90 tablet, Refills: 3    Associated Diagnoses: Depression, unspecified depression type      multivitamin (OCUVITE) TABS Take 1 tablet by mouth daily       senna-docusate (SENOKOT-S/PERICOLACE) 8.6-50 MG tablet Take 2 tablets by mouth 2 times daily as needed for constipation      diclofenac (VOLTAREN) 1 % topical gel  Apply 4 g topically 4 times daily as needed for moderate pain  Qty: 150 g, Refills: 11    Associated Diagnoses: Age-related osteoporosis with current pathological fracture with routine healing, subsequent encounter       !! - Potential duplicate medications found. Please discuss with provider.        Allergies   Allergies   Allergen Reactions     Sulfa Drugs Difficulty breathing, Swelling and Hives     Adhesive Tape Blisters     Amiodarone Other (See Comments)     Developed pleural effusion     Cephalosporins      Tolerated ceftriaxone      Penicillins Hives     Reaction occurred as a child  Tolerated ceftriaxone in past      Data   Most Recent 3 CBC's:  Recent Labs   Lab Test 08/05/22  0732 08/04/22  0217 08/03/22  0803 08/02/22  0740   WBC 15.2* 15.9* 15.5* 13.2*   HGB 9.8* 10.1*  --  9.8*   MCV 97 98  --  98    448  --  382      Most Recent 3 BMP's:  Recent Labs   Lab Test 08/04/22  0217 08/03/22  2105 08/03/22  0803 08/02/22  0740 08/01/22  0801 08/01/22  0748     --  138  --   --  142   POTASSIUM 3.8  3.8 3.4 3.0*   < >  --  3.6   CHLORIDE 110*  --  106  --   --  114*   CO2 27  --  29  --   --  26   BUN 13  --  13  --   --  18   CR 0.48*  --  0.57*  --   --  0.59*   ANIONGAP 3  --  3  --   --  2*   ULISSES 8.0*  --  8.2*  --   --  8.5   *  --  92  --  86 89    < > = values in this interval not displayed.     Most Recent 2 LFT's:  Recent Labs   Lab Test 07/21/22  1717 06/13/22  1439   AST 23 24   ALT 12 12   ALKPHOS 83 104   BILITOTAL 0.5 0.5     Most Recent INR's and Anticoagulation Dosing History:  Anticoagulation Dose History     Recent Dosing and Labs Latest Ref Rng & Units 9/4/2015 8/1/2016 9/5/2017 10/22/2018 3/23/2020 5/18/2020 5/18/2020    INR 0.86 - 1.14 1.02 1.17(H) 1.02 1.08 1.07 1.18(H) 1.51(H)        Most Recent 3 Troponin's:  Recent Labs   Lab Test 05/19/20  1736 05/19/20  1242 05/19/20  0850   TROPI 0.645* 0.692* 0.802*     Most Recent Cholesterol Panel:  Recent Labs   Lab Test  10/26/21  1627   CHOL 92   LDL 37   HDL 45   TRIG 52     Most Recent 6 Bacteria Isolates From Any Culture (See EPIC Reports for Culture Details):  Recent Labs   Lab Test 06/23/20  1200 05/28/20  1631 05/28/20  1412 09/05/18 2020 09/05/18  1940 08/02/16  1609   CULT <1000 colonies/mL  mixed urogenital leti  Susceptibility testing not routinely done   No growth No growth No growth No growth Culture negative for acid fast bacilli  Assayed at [facility] [address]    No growth after 4 weeks  Culture negative after 4 weeks  No Legionella species isolated  No anaerobes isolated  No growth     Most Recent TSH, T4 and A1c Labs:  Recent Labs   Lab Test 01/10/22  2134 06/14/16  1252 04/06/16  1252 12/18/15  1406 09/04/15  1040   TSH 2.58   < > 6.32*   < >  --    T4  --   --  0.90   < >  --    A1C  --   --   --   --  5.3    < > = values in this interval not displayed.       Results for orders placed or performed during the hospital encounter of 07/21/22   XR Chest 2 Views    Narrative    EXAM: XR CHEST 2 VIEWS  LOCATION: [facility]  DATE/TIME: 7/21/2022 6:09 PM    INDICATION: Weakness.  COMPARISON: 05/19/2020.      Impression    IMPRESSION: Progressively increased interstitial opacity throughout the right lung, of uncertain chronicity. This may reflect a combination of fibrosis versus acute asymmetric edema or atypical infectious infiltrate.    Chronic blunting of the right costophrenic angle, favoring scar. No left pleural effusion. No pneumothorax.    Upper limits of normal heart size. Atheromatous calcifications of the thoracic aorta. Additional vascular calcifications of the subclavian and axillary arteries.   Chest CT w/o contrast    Narrative    EXAM: CT CHEST W/O CONTRAST  LOCATION: [facility]  DATE/TIME: 7/21/2022 8:30 PM    INDICATION: abnormal x ray, weakness  COMPARISON: 03/10/2022  TECHNIQUE: CT chest without IV contrast.  Multiplanar reformats were obtained. Dose reduction techniques were used.  CONTRAST: None.    FINDINGS:   LUNGS AND PLEURA: Increased bilateral groundglass and interstitial airspace opacities superimposed upon background fibrosis with reticulation, interlobular septal thickening and right basilar honeycombing. No pleural effusion. Trace pleural effusions.    MEDIASTINUM/AXILLAE: Borderline and mildly enlarged mediastinal and hilar lymph nodes, likely reactive. Mitral annular calcifications.    CORONARY ARTERY CALCIFICATION: Severe.    UPPER ABDOMEN: Cholelithiasis    MUSCULOSKELETAL: No aggressive or destructive lesion. Unchanged compression deformities and vertebroplasty in the lower thoracic spine.      Impression    IMPRESSION:   1.  New bilateral groundglass opacities most suggestive of atypical multifocal pneumonia.    2.  Background fibrosis and interstitial lung disease.    3.  Tiny bilateral pleural effusions.     IR Gastrostomy Tube Percutaneous Plcmnt    Narrative    [place] [facility]  LOCATION: [facility]  DATE: 7/27/2022    PROCEDURE: UNSUCCESSFUL PERCUTANEOUS GASTROSTOMY PLACEMENT SECONDARY  TO ANATOMY    INTERVENTIONAL RADIOLOGIST: Alan Concepcion MD.    INDICATION: 77-year-old male with feeding difficulties. Gastrostomy  placement is requested. Reviewing the patient's previous CT imaging,  most recently from 7/21/2022 the colon resides anterior to the  stomach.    CONSENT: The risks, benefits and alternatives of the stated procedure  were discussed with the patient  in detail. All questions were  answered. Informed consent was given to proceed with the procedure.    MODERATE SEDATION: Versed 0.5 mg IV; Fentanyl 25 mcg IV.  Under  physician supervision, Versed and fentanyl were administered for  moderate sedation. Pulse oximetry, heart rate and blood pressure were  continuously monitored by an independent trained observer. The  physician spent 4 minutes of face-to-face sedation time with  the  patient.    CONTRAST: None.  ANTIBIOTICS: None.  ADDITIONAL MEDICATIONS: None.    FLUOROSCOPIC TIME: 0.3 minutes.  RADIATION DOSE: Air Kerma: 1 mGy.    COMPLICATIONS: No immediate complications.    STERILE BARRIER TECHNIQUE: Maximum sterile barrier technique was used.  Cutaneous antisepsis was performed at the operative site with  application of 2% chlorhexidine and large sterile drape. Prior to the  procedure, the  and assistant performed hand hygiene and wore  hat, mask, sterile gown, and sterile gloves during the entire  procedure.    PROCEDURE:    An 11 [place] feeding tube was inserted through the naris and  into the stomach. Positioning was confirmed fluoroscopically.    The stomach was insufflated with air. As there was no safe  percutaneous access window to avoid the liver or transverse colon the  procedure was aborted.    FINDINGS:  Images show the transverse colon in front of the stomach with no  percutaneous access window.      Impression    IMPRESSION:    Unsuccessful gastrostomy placement given the patient's anatomy as  detailed above. If enteric feeding is necessary surgical consultation  may be beneficial.    LAURI CROW MD         SYSTEM ID:  [id number]   CT Chest Hi-Resolution wo Contrast    Narrative    CT CHEST WITHOUT CONTRAST HIGH RESOLUTION August 2, 2022 12:15 PM    HISTORY: Evaluate ILD/fibrosis.    COMPARISON: July 21, 2022.    TECHNIQUE: Volumetric helical acquisition of CT images of the chest  from the clavicles to the kidneys were acquired without IV contrast.  Images were displayed in high resolution and standard reconstructions.  Expiratory views also obtained. Radiation dose for this scan was  reduced using automated exposure control, adjustment of the mA and/or  kV according to patient size, or iterative reconstruction technique.    FINDINGS: Moderately extensive fibrotic changes in the periphery and  base of both lungs. Superimposed infiltrate suspected in the  right  lower lobe. Trace pleural fluid on the right. Trace pleural fluid on  the left. Free air in the upper abdomen compatible with recent  surgery. A few mildly prominent mediastinal nodes are noted which are  nonspecific and may be reactive in this setting. Cardiomegaly. There  are extensive coronary vascular calcifications and/or stents  consistent with coronary artery disease. There are moderate  atherosclerotic changes of the visualized aorta and its branches.  There is no evidence of aortic aneurysm. No frankly destructive bony  lesions. T12 and L1 compression deformity is evident and unchanged  from previous.      Impression    IMPRESSION:  1. Moderately extensive right lower lobe infiltrate.  2. Mild-moderate peripheral and basilar fibrosis most compatible with  usual interstitial pneumonitis.    MARY WESLEY MD         SYSTEM ID:  [id number]   Echocardiogram Complete     Value    LVEF  60-65%    Narrative    [id number]  [id number]  [id number]  [id number]^INESSA^ISABEL     [facility]  Echocardiography Laboratory  [address] [place] [address]     Name: PHANI AVITIA  MRN: [id number]  : 1945  Study Date: 2022 03:05 PM  Age: 77 yrs  Gender: Male  Patient Location: [facility]  Reason For Study: Dizziness  Ordering Physician: ISABEL WYLIE  Performed By: Ally Hayden RDCS     BSA: 1.6 m2  Height: 67 in  Weight: 120 lb  HR: 90  BP: 78/55 mmHg  ______________________________________________________________________________  Procedure  Complete Echo Adult.  ______________________________________________________________________________  Interpretation Summary     1. Normal left ventricular systolic function. Estimated LVEF 60-70%. No  regional wall motion abnormalities  2. Moderately dilated right ventricle with normal systolic function.  3. Severe, low-flow low gradient aortic valve stenosis with mean gradient 23-  25 mmHg, valve area 0.75 cmÂ .  3. Severe tricuspid valve  regurgitation.  4. Mild mitral stenosis and mild regurgitation.  5. Severe bi-atrial enlargement.  Compared to previous study dated, No significant change.  ______________________________________________________________________________  Left Ventricle  The left ventricle is normal in size. There is normal left ventricular wall  thickness. Left ventricular systolic function is normal. The visual ejection  fraction is 60-65%. Diastolic function not assessed due to significant mitral  annular calcification. No regional wall motion abnormalities noted. Septal  motion is consistent with post-operative state.     Right Ventricle  The right ventricle is moderately dilated. The right ventricular systolic  function is normal.     Atria  There is severe biatrial enlargement.     Mitral Valve  There is severe mitral annular calcification. Calcified mitral apparatus.  There is mild to moderate (1-2+) mitral regurgitation. There is moderate  mitral stenosis. Mean diastolic gradient not obtained.     Tricuspid Valve  There is severe (4+) tricuspid regurgitation. The right ventricular systolic  pressure is approximated at 32.4 mmHg plus the right atrial pressure.     Aortic Valve  There is mild (1+) aortic regurgitation. Severe valvular aortic stenosis. The  mean AoV pressure gradient is 23.7 mmHg. The calculated aortic valve are is  0.75 cm^2.     Pulmonic Valve  The pulmonic valve is not well seen, but is grossly normal. This degree of  valvular regurgitation is within normal limits.     Vessels  The aortic root is normal size. Inferior vena cava not well visualized for  estimation of right atrial pressure.     Pericardium  There is no pericardial effusion.     Rhythm  The rhythm was atrial fibrillation.  ______________________________________________________________________________  MMode/2D Measurements & Calculations     IVSd: 0.93 cm  LVIDd: 3.6 cm  LVIDs: 2.2 cm  LVPWd: 0.89 cm  FS: 39.5 %  LV mass(C)d: 94.6 grams  LV  mass(C)dI: 58.1 grams/m2  Ao root diam: 3.4 cm  LA dimension: 5.2 cm  LA/Ao: 1.5  LVOT diam: 2.1 cm  LVOT area: 3.6 cm2  RWT: 0.49     Doppler Measurements & Calculations  MV E max lex: 119.0 cm/sec  MV max P.7 mmHg  MV mean P.6 mmHg  MV V2 VTI: 28.2 cm  MVA(VTI): 1.7 cm2  MV dec slope: 731.9 cm/sec2  Ao V2 max: 328.5 cm/sec  Ao max P.0 mmHg  Ao V2 mean: 222.4 cm/sec  Ao mean P.7 mmHg  Ao V2 VTI: 64.2 cm  SEBASTIAN(I,D): 0.75 cm2  SEBASTIAN(V,D): 0.65 cm2  LV V1 max P.4 mmHg  LV V1 max: 59.1 cm/sec  LV V1 VTI: 13.3 cm  SV(LVOT): 47.9 ml  SI(LVOT): 29.4 ml/m2  PA acc time: 0.06 sec     TR max lex: 284.4 cm/sec  TR max P.4 mmHg  AV Lex Ratio (DI): 0.18  SEBASTIAN Index (cm2/m2): 0.46     ______________________________________________________________________________  Report approved by: Funmilayo Jay 2022 05:00 PM

## 2022-08-05 NOTE — PROGRESS NOTES
Uneventful day. No acute changes. Pleasant. Denies pain. TF running as ordered. Will discharge to a TCU this evening @ 1530. Pt and family aware. No fever. AVSS. Latest Vitals: Blood pressure 107/59, pulse 89, temperature 97.9  F (36.6  C), temperature source Axillary, resp. rate 16, weight 52.6 kg (115 lb 15.4 oz), SpO2 93 %.

## 2022-08-05 NOTE — PROGRESS NOTES
Care Management Discharge Note    Discharge Date: 08/05/2022       Discharge Disposition: Skilled Nursing Facility    Discharge Services: None    Discharge DME: None    Discharge Transportation:      Private pay costs discussed: private room/amenity fees    PAS Confirmation Code: [id number]  Patient/family educated on Medicare website which has current facility and service quality ratings: no    Education Provided on the Discharge Plan:    Persons Notified of Discharge Plans: yes  Patient/Family in Agreement with the Plan: yes    Handoff Referral Completed: No    Additional Information:  Writer paged Dr. Sosa regarding patient discharge today. If stable, writer will call [facility] to set ride time via [employer].    Addendum: discharge orders received for discharge today. Call to [facility] to confirm ride time.     Addendum: second call placed to [facility] to set up transport. Message left.    Call received from [facility] that patient can come today or tomorrow. Writer noted that discharge orders are available today. Ride scheduled with Jude via [employer] at 1530. Call placed to patient's son Efrem to update and he is in agreement. Orders faxed to facility. PAS previously completed.         THEO Durán

## 2022-08-05 NOTE — PLAN OF CARE
Goal Outcome Evaluation:    Diagnosis: Pneumonia, generalized weakness  POD#: N/A, admitted 14 days  Mental Status: A&O x4  Activity/dangle: Ax2 with gait belt and walker, was up in chair at beginning of shift  Diet: Tube feed via PEG  Pain: Denies  Bravo/Voiding: Urinal/incontinent  Tele/Restraints/Iso: No  02/LDA: ALE MCKENZIE on L arm.   D/C Date: Tomorrow to TCU

## 2022-08-05 NOTE — PLAN OF CARE
Speech Language Therapy Discharge Summary    Reason for therapy discharge:    Discharged to transitional care facility.    Progress towards therapy goal(s). See goals on Care Plan in [facility] electronic health record for goal details.  Goals partially met.  Barriers to achieving goals:   discharge from facility.    Therapy recommendation(s):    SLP at next level of care as desired by patient. Per most recent SLP recommendations -NPO with ice chips, cough and expectorate thick secretions, encourage effortful swallow exericise. Discussed pt's overall goals for swallow function since G-TF placement on 8/3/22- pt reporting no wish to resume PO in future (for now) given baseline difficulty + c/o aspiration, he does wish to continue ice chips as they do provide him great moisture/comfort.

## 2022-08-05 NOTE — PROGRESS NOTES
Patient A/O x4, denies pain and discomfort. Has diminished lung sounds but no SOB at rest. Has occasional moist productive cough, saturation within therapeutic range on room air. Tube feeding running all night and patient tolerating. Has not been up this shift.

## 2022-08-08 PROBLEM — F51.02 ADJUSTMENT INSOMNIA: Status: ACTIVE | Noted: 2022-01-01

## 2022-08-08 PROBLEM — R62.7 FAILURE TO THRIVE IN ADULT: Status: ACTIVE | Noted: 2022-01-01

## 2022-08-08 PROBLEM — R52 PAIN: Status: ACTIVE | Noted: 2022-01-01

## 2022-08-08 NOTE — PROGRESS NOTES
[facility] GERIATRICS    PRIMARY CARE PROVIDER AND CLINIC:  Danny Paige MD, MD, [address] / [place] [address]  Chief Complaint   Patient presents with     Hospital F/U      [facility] Medical Record Number:  [id number]  Place of Service where encounter took place:  [facility] ([facility]) [[id number]]    Efrem Ramos  is a 77 year old  (1945), admitted to the above facility from  [facility]. Hospital stay 7/21/22 through 8/5/22..   HPI:    This is a 76 yo male with a PMHx of Htn, PVD, CAD, MGUS, Aortic stenosis, CVA, chronic anemia who presented with asymptomatic hypotension.  Imaging found new groundglass opacities on CT, subjective of atypical multifocal pneumonia, started on antibiotics and completed on 7/29, developed fever, pulmonology consulted.  CT more looking like chronic aspiration, continued on clindamycin for 1 more week with repeat CT in 6 weeks with PCP.  Per severe dysphagia underwent G-tube placement on 7/28 with tube feeds.  Per hypertension, random cortisol level normal, chronic issue.  Per cardiac disease, echo showed EF 60 to 65%, severe low-flow low gradient aortic valve stenosis with a mean gradient of 23-25, with severe tricuspid regurg, evaluated by cardiology on 7/22 for possible TAVR, will evaluate outpatient.  No other changes, discharged to [facility] for rehab.    CODE STATUS/ADVANCE DIRECTIVES DISCUSSION:  Full Code  CPR/Full code   ALLERGIES:   Allergies   Allergen Reactions     Sulfa Drugs Difficulty breathing, Swelling and Hives     Adhesive Tape Blisters     Amiodarone Other (See Comments)     Developed pleural effusion     Cephalosporins      Tolerated ceftriaxone      Penicillins Hives     Reaction occurred as a child  Tolerated ceftriaxone in past       PAST MEDICAL HISTORY:   Past Medical History:   Diagnosis Date     Atrial fibrillation (H)     amiodarone therapy discontinued due to pulmonary toxicity     Atrial flutter  (H)      Benign essential hypertension 11/20/2018     Cancer (H) vocal cord     Carpal tunnel syndrome     abstracted 7/3/02.     Coronary artery disease involving native coronary artery of native heart without angina pectoris 05/08/2020    Cath 5/28/2020: patent stents; Cath 5/18/2020: ISABEL x4 to RCA; Cath 3/2020: 1 vessel disease; Cath 2017: moderate 2 vessel disease     CVA (cerebral infarction) 05/05/2015     Demyelinating disease of central nervous system, unspecified (H)     abstracted 7/3/02. 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     Pneumonia 08/17/2016     PVD (peripheral vascular disease) (H)      Redundant colon     needs CT colonography     Shingles      SKIN DISORDERS NEC 03/15/2005     Sleep apnea      Sleep apnea      SVT (supraventricular tachycardia) (H)       PAST SURGICAL HISTORY:   has a past surgical history that includes NONSPECIFIC PROCEDURE (as a child); NONSPECIFIC PROCEDURE (early); Head and neck surgery; biopsy (brain 2002); Thoracoscopic wedge resection lung (Right, 8/2/2016); Bone marrow biopsy, bone specimen, needle/trocar (N/A, 6/8/2017); orthopedic surgery; Esophagoscopy, gastroscopy, duodenoscopy (EGD), combined (N/A, 9/8/2018); cardiac catherization (09/05/2017); Coronary Angiogram (N/A, 3/23/2020); Instantaneous Wave-Free Ratio (N/A,  3/23/2020); Right Heart Catheterization (N/A, 5/28/2020); Heart Catheterization with Possible Intervention (N/A, 5/28/2020); Heart Catheterization with Possible Intervention (N/A, 5/18/2020); Temporary Pacemaker Insertion (N/A, 5/18/2020); Percutaneous Coronary Intervention Stent (N/A, 5/18/2020); Percutaneous Coronary Intervention Atherectomy Orbital (N/A, 5/18/2020); Bone marrow biopsy, bone specimen, needle/trocar (N/A, 2/23/2022); IR Gastrostomy Tube Percutaneous Plcmnt (7/27/2022); and Laparoscopic assisted insertion tube gastrotomy (N/A, 7/28/2022).  FAMILY HISTORY: family history includes Blood Disease in his mother; Cancer - colorectal in his maternal grandfather; Cardiovascular in his father; Diabetes (age of onset: 5) in his sister; Hypertension in his brother; Respiratory in an other family member.  SOCIAL HISTORY:   reports that he quit smoking about 9 years ago. His smoking use included cigarettes. He has a 30.00 pack-year smoking history. He has never used smokeless tobacco. He reports previous alcohol use of about 42.0 standard drinks of alcohol per week. He reports that he does not use drugs.  Patient's living condition: lives with family, SON     Post Discharge Medication Reconciliation Status: Post Medication Reconciliation Status:         Current Outpatient Medications   Medication Sig     acetaminophen (TYLENOL) 500 MG tablet Take 1,000 mg by mouth 3 times daily     guaiFENesin (MUCINEX) 600 MG 12 hr tablet Take 600 mg by mouth 2 times daily     lactobacillus rhamnosus, GG, (CULTURELL) capsule Take 1 capsule by mouth 2 times daily     Lidocaine (LIDOCARE) 4 % Patch Place 1 patch onto the skin every 24 hours To prevent lidocaine toxicity, patient should be patch free for 12 hrs daily. Apply to abdomen     melatonin 3 MG tablet Take 6 mg by mouth At Bedtime     oxyCODONE (ROXICODONE) 5 MG tablet Take 2.5 mg by mouth every 6 hours as needed for severe pain     apixaban ANTICOAGULANT (ELIQUIS) 5 MG  tablet Take 1 tablet (5 mg) by mouth 2 times daily     atorvastatin (LIPITOR) 40 MG tablet Take 1 tablet (40 mg) by mouth daily (Patient taking differently: Take 40 mg by mouth every evening)     calcium carbonate 600 mg-vitamin D 400 units (CALTRATE) 600-400 MG-UNIT per tablet Take 1 tablet by mouth 2 times daily      clindamycin (CLEOCIN) 75 MG/5ML solution 30 mLs (450 mg) by Per G Tube route 3 times daily (Patient taking differently: 450 mg by Per G Tube route 3 times daily Complete on 8/12)     clopidogrel (PLAVIX) 75 MG tablet Take 1 tablet (75 mg) by mouth daily (Patient taking differently: Take 75 mg by mouth every evening)     diclofenac (VOLTAREN) 1 % topical gel Apply 4 g topically 4 times daily as needed for moderate pain (Patient not taking: Reported on 7/21/2022)     digoxin (LANOXIN) 125 MCG tablet Take 1 tablet (125 mcg) by mouth daily     gabapentin (NEURONTIN) 300 MG capsule Take 300 mg by mouth every morning     gabapentin (NEURONTIN) 300 MG capsule Take 2 capsules (600 mg) by mouth At Bedtime     ipratropium - albuterol 0.5 mg/2.5 mg/3 mL (DUONEB) 0.5-2.5 (3) MG/3ML neb solution Take 1 vial (3 mLs) by nebulization every 4 hours as needed for wheezing or shortness of breath / dyspnea     meclizine (ANTIVERT) 25 MG tablet Take 1 tablet (25 mg) by mouth 2 times daily     mirtazapine (REMERON) 15 MG tablet Take 1 tablet (15 mg) by mouth At Bedtime     multivitamin (OCUVITE) TABS Take 1 tablet by mouth daily      senna-docusate (SENOKOT-S/PERICOLACE) 8.6-50 MG tablet Take 2 tablets by mouth 2 times daily as needed for constipation     No current facility-administered medications for this visit.       ROS:  10 point ROS of systems including Constitutional, Eyes, Respiratory, Cardiovascular, Gastroenterology, Genitourinary, Integumentary, Musculoskeletal, Psychiatric were all negative except for pertinent positives noted in my HPI.    Vitals:  BP 95/55   Pulse 96   Temp 98.3  F (36.8  C)   Resp 18    "Ht 1.702 m (5' 7\")   Wt 64.1 kg (141 lb 6.4 oz)   SpO2 94%   BMI 22.15 kg/m    Exam:  GENERAL APPEARANCE:  Alert, oriented, thin, cooperative, generalized pain  ENT:  Mouth and posterior oropharynx normal, moist mucous membranes, normal hearing acuity  NECK:  No adenopathy,masses or thyromegaly  RESP:  no respiratory distress, diminished breath sounds bilaterally, RA  CV:  IRR, 3/6 PRAFUL 2ICS, no rub or gallop, no edema  ABDOMEN:  normal bowel sounds, soft, nontender, no hepatosplenomegaly or other masses, has loose stools, PEG patent, TF  M/S:   able to move all extremities  SKIN:  PEG site intact  NEURO:   No focal deficits  PSYCH:  oriented X 3, memory impaired     Lab/Diagnostic data:    Most Recent 3 CBC's:  Recent Labs   Lab Test 08/05/22  0732 08/04/22  0217 08/03/22  0803 08/02/22  0740   WBC 15.2* 15.9* 15.5* 13.2*   HGB 9.8* 10.1*  --  9.8*   MCV 97 98  --  98    448  --  382     Most Recent 3 BMP's:  Recent Labs   Lab Test 08/04/22  0217 08/03/22  2105 08/03/22  0803 08/02/22  0740 08/01/22  0801 08/01/22  0748     --  138  --   --  142   POTASSIUM 3.8  3.8 3.4 3.0*   < >  --  3.6   CHLORIDE 110*  --  106  --   --  114*   CO2 27  --  29  --   --  26   BUN 13  --  13  --   --  18   CR 0.48*  --  0.57*  --   --  0.59*   ANIONGAP 3  --  3  --   --  2*   ULISSES 8.0*  --  8.2*  --   --  8.5   *  --  92  --  86 89    < > = values in this interval not displayed.       ASSESSMENT/PLAN:    (J18.9) Community acquired pneumonia, unspecified laterality    Aspiration pneumonia  Severe dysphagia  Continue clindamycin until 8/12, recommend follow-up CT in 6 weeks (mid sept) with PCP.  Start Mucinex 600 mg twice daily x5 days, complete on 8/12.  Encourage incentive spirometry    (I73.9) PVD (peripheral vascular disease) (H)  (I10) Benign essential hypertension  CAD  CVA  EF-60-65%  Continue statin and plavix. Continue digoxin 125mcg daily, recheck level in 2 wks. monitor blood pressure twice " daily    (I48.91) Atrial fibrillation, unspecified type (H)  Continue apixaban 5 mg twice daily    (R62.7) Failure to thrive in adult  Severe dysphagia  PEG placed on 7/28, continue tube feed with Jevity 1.5 50cc/hr with FW flushes.  Continue mirtazapine 15 mg at bedtime.  Speech consult    Diarrhea per tube feeds and antibiotic  Dietitian to add banana flakes, start Culturelle 10 billion cell count 1 tab twice daily x1 week    (R52) Pain  neuropathy  Increase Tylenol to 1000 mg 3 times daily, start lidocaine patch on abdomen.  Continue gabapentin 300 mg in a.m. and 600 mg at at bedtime.  Decrease oxycodone to 2.5 mg every 6 hours as needed    Renal fx  Last creatinine 0.48 with GFR >90, recheck BMP on 8/9    (D72.829) Leukocytosis, unspecified type  Last WBC 15.2, recheck CBC on 8/9    (D64.9) Anemia, unspecified type  Last hemoglobin 9.8, recheck CBC on 8/9    Dizziness  Continue meclizine 25 mg twice daily    (F51.02) Adjustment insomnia  Increase melatonin to 6 mg at bedtime    Orders:  Monitor blood pressure twice daily, speech consult, Mucinex with incentive spirometry, increase Tylenol with lidocaine patch, start melatonin, lab recheck    Total time spent with patient visit at the [facility] nursing facility was 35 min including patient visit and review of past records. Greater than 50% of total time spent with counseling and coordinating care due to increased blood pressure monitoring per history of hypotension, speech consult for known dysphagia, start Mucinex and incentive spirometry for pneumonia recovery, increase Tylenol with lidocaine patch for pain control, start melatonin for insomnia, BMP/CBC recheck per renal function and anemia with therapy which lasted 18 minutes.     Electronically signed by:  Patric Sutton NP

## 2022-08-08 NOTE — TELEPHONE ENCOUNTER
Pt currently in TCU after IP hosp.    Inj 8/5 canceled    Closing    Nava GREEN RN Care Coordinator  [facility]

## 2022-08-08 NOTE — PROGRESS NOTES
Clinic Care Coordination Contact  Care Coordination Transition Communication         Clinical Data: [employer]     Discharge Summary  Hospitalist     Date of Admission:  7/21/2022  Date of Discharge:  8/5/2022  Discharging Provider: Mj Sosa MD, MD        Discharge Diagnoses        Aspiration  pneumonia with history of dysphagia  Abnormal CT chest; pulmonary fibrosis versus chronic aspiration.  Severe malnutrition with history of dysphagia.  Chronic hypertension.   Afib/flutter  Severe aortic stenosis  HTN  HLD  PVD  CAD   History of CVA  Hypokalemia  Hypomagnesemia and hypophosphatemia  MEL  Hx of Acute disseminated encephalomyelitis  MGUS  Diarrhea  Anemia chronic disease    Transition to Facility:              Fac[facility] Name: [facility]              Contact name and phone number/fax: [phone]/ [phone]    Plan: RN/SW Care Coordinator will await notification from facility staff informing RN/SW Care Coordinator of patient's discharge plans/needs. RN/SW Care Coordinator will review chart and outreach to facility staff every 4 weeks and as needed.     ZIYAD Valadez  Clinic Care Coordinator  [facility]  [phone]  [email or web address]

## 2022-08-08 NOTE — PLAN OF CARE
Physical Therapy Discharge Summary    Reason for therapy discharge:    Discharged to transitional care facility.    Progress towards therapy goal(s). See goals on Care Plan in [facility] electronic health record for goal details.  Goals not met.  Barriers to achieving goals:   discharge from facility.    Therapy recommendation(s):    Continued therapy is recommended.  Rationale/Recommendations:  To progress functional mobility towards independence.

## 2022-08-10 NOTE — PROGRESS NOTES
"[facility] GERIATRICS    Chief Complaint   Patient presents with     RECHECK     HPI:  Efrem Ramos is a 77 year old  (1945), who is being seen today for an episodic care visit at: [facility] ([facility]) [[id number]].     This is a 76 yo male with a PMHx of Htn, PVD, CAD, MGUS, Aortic stenosis, CVA, chronic anemia who presented with asymptomatic hypotension.  Imaging found new groundglass opacities on CT, subjective of atypical multifocal pneumonia, started on antibiotics and completed on 7/29, developed fever, pulmonology consulted.  CT more looking like chronic aspiration, continued on clindamycin for 1 more week with repeat CT in 6 weeks with PCP.  Per severe dysphagia underwent G-tube placement on 7/28 with tube feeds.  Per hypertension, random cortisol level normal, chronic issue.  Per cardiac disease, echo showed EF 60 to 65%, severe low-flow low gradient aortic valve stenosis with a mean gradient of 23-25, with severe tricuspid regurg, evaluated by cardiology on 7/22 for possible TAVR, will evaluate outpatient.  No other changes, discharged to  for rehab.    Today's concern is:   Diarrhea, hypokalemia, pain control, therapy progress    Allergies, and PMH/PSH reviewed in [facility] today.  REVIEW OF SYSTEMS:  4 point ROS including Respiratory, CV, GI and , other than that noted in the HPI,  is negative    Objective:   BP 90/57   Pulse 65   Temp (!) 96.5  F (35.8  C)   Resp 18   Ht 1.702 m (5' 7\")   Wt 64.1 kg (141 lb 6.4 oz)   SpO2 95%   BMI 22.15 kg/m    GENERAL APPEARANCE:  Alert, oriented, thin, cooperative, generalized pain has improved  RESP:  no respiratory distress, diminished breath sounds bilaterally, RA, non productive cough  CV:  IRR, 3/6 PRAFUL 2ICS, no rub or gallop, no edema  ABDOMEN:  normal bowel sounds, soft, nontender, no hepatosplenomegaly or other masses, has loose stools, PEG patent, TF  PSYCH:  oriented X 3, memory impaired       Most Recent 3 CBC's:  Recent Labs   Lab " Test 08/09/22  1210 08/05/22  0732 08/04/22  0217   WBC 14.1* 15.2* 15.9*   HGB 9.2* 9.8* 10.1*   MCV 98 97 98   * 420 448     Most Recent 3 BMP's:  Recent Labs   Lab Test 08/10/22  0903 08/09/22  1210 08/04/22  0217    139 140   POTASSIUM 2.9* 2.7* 3.8  3.8   CHLORIDE 102 103 110*   CO2 30 32 27   BUN 17 18 13   CR 0.55* 0.61* 0.48*   ANIONGAP 4 4 3   ULISSES 8.0* 8.4* 8.0*   * 106* 114*       Assessment/Plan:    (J18.9) Community acquired pneumonia, unspecified laterality    Aspiration pneumonia  Severe dysphagia  Continue clindamycin until 8/12, recommend follow-up CT in 6 weeks (mid sept) with PCP.  Continue Mucinex 600 mg twice daily x5 days, complete on 8/12.  Encourage incentive spirometry. No change     (I73.9) PVD (peripheral vascular disease) (H)  (I10) Benign essential hypertension  CAD  CVA  EF-60-65%  Continue statin and plavix. Continue digoxin 125mcg daily, recheck level in 2 wks. SBP 90-100s, HR 60-90s. Plan to increase FW     (I48.91) Atrial fibrillation, unspecified type (H)  Continue apixaban 5 mg twice daily, no change     (R62.7) Failure to thrive in adult  Severe dysphagia  PEG placed on 7/28, continue tube feed with Jevity 1.5 50cc/hr with FW flushes increased to 200cc QID.  Continue mirtazapine 15 mg at bedtime.  Speech consulted, currently NPO     Diarrhea per tube feeds and antibiotic  Dietitian added banana flakes, today increase Culturelle 10 billion cell count 1 tab three times daily x 1wk. Will check for C-diff     (R52) Pain  neuropathy  Using Tylenol 1000 mg 3 times daily, lidocaine patch on abdomen.  Continue gabapentin 300 mg in a.m. and 600 mg at at bedtime. Today will discontinue oxycodone per non-use     Renal fx  Last creatinine 0.55 with GFR >90 on 8/10, recheck BMP on 8/15    Acute hypokalemia  Last 2.9, replaced with 40mEq x2, start on 10mEq daily, recheck BMP/mag on 8/15     (D72.829) Leukocytosis, unspecified type  Last WBC 14.1 on 8/9  (improving)     (D64.9) Anemia, unspecified type  Last hemoglobin 9.2 on 8/9 (decreased), recheck CBC on 8/15     Dizziness  Continue meclizine 25 mg twice daily, improved     (F51.02) Adjustment insomnia  Using melatonin 6 mg at bedtime, adequate    Therapy  Strengthening, becomes orthostatic. Limited progress    Orders:  Increase FW, potassium replacement, check for C-diff, discontinue oxycodone, BMP/mag recheck    Electronically signed by: Patric Sutton NP

## 2022-08-16 NOTE — PROGRESS NOTES
"[facility] GERIATRICS    Chief Complaint   Patient presents with     RECHECK     HPI:  Efrem Ramos is a 77 year old  (1945), who is being seen today for an episodic care visit at:  ([facility]) [[id number]].   This is a 76 yo male with a PMHx of Htn, PVD, CAD, MGUS, Aortic stenosis, CVA, chronic anemia who presented with asymptomatic hypotension.  Imaging found new groundglass opacities on CT, subjective of atypical multifocal pneumonia, started on antibiotics and completed on 7/29, developed fever, pulmonology consulted.  CT more looking like chronic aspiration, continued on clindamycin for 1 more week with repeat CT in 6 weeks with PCP.  Per severe dysphagia underwent G-tube placement on 7/28 with tube feeds.  Per hypertension, random cortisol level normal, chronic issue.  Per cardiac disease, echo showed EF 60 to 65%, severe low-flow low gradient aortic valve stenosis with a mean gradient of 23-25, with severe tricuspid regurg, evaluated by cardiology on 7/22 for possible TAVR, will evaluate outpatient.  No other changes, discharged to [facility] for rehab.    Today's concern is:   Hypotension, diarrhea, therapy progress    Allergies, and PMH/PSH reviewed in [facility] today.  REVIEW OF SYSTEMS:  4 point ROS including Respiratory, CV, GI and , other than that noted in the HPI,  is negative    Objective:   /72   Pulse 78   Temp 97  F (36.1  C)   Resp 18   Ht 1.702 m (5' 7\")   Wt 57.2 kg (126 lb 1.6 oz)   SpO2 94%   BMI 19.75 kg/m    GENERAL APPEARANCE:  Alert, oriented, thin, denies pain  RESP:  no respiratory distress, diminished breath sounds bilaterally, RA, non productive cough  CV:  IRR, 3/6 PRAFUL 2ICS, no rub or gallop, no edema  ABDOMEN:  normal bowel sounds, soft, nontender, no hepatosplenomegaly or other masses, has loose stools, PEG patent, TF  PSYCH:  oriented X 3, memory impaired. SLUMS 22/30, safety Q 18/22      Most Recent 3 CBC's:  Recent Labs   Lab Test 08/15/22  0545 " 08/09/22  1210 08/05/22  0732   WBC 11.8* 14.1* 15.2*   HGB 9.4* 9.2* 9.8*    98 97    469* 420     Most Recent 3 BMP's:  Recent Labs   Lab Test 08/15/22  0545 08/10/22  0903 08/09/22  1210    136 139   POTASSIUM 3.2* 2.9* 2.7*   CHLORIDE 105 102 103   CO2 28 30 32   BUN 17 17 18   CR 0.52* 0.55* 0.61*   ANIONGAP 4 4 4   ULISSES 8.4* 8.0* 8.4*   GLC 87 107* 106*       Assessment/Plan:    (J18.9) Community acquired pneumonia, unspecified laterality    Aspiration pneumonia  Severe dysphagia  Received clindamycin until 8/12, recommend follow-up CT in 6 weeks (mid sept) with PCP.  Received Mucinex 600 mg twice daily x5 days, completed on 8/12.  Encourage incentive spirometry. No change     (I73.9) PVD (peripheral vascular disease) (H)  (I10) Benign essential hypertension  CAD  CVA  EF 60-70%  Continue statin and plavix. Continue digoxin 125mcg daily, recheck level in 2 wks. SBP 90-100s, HR 60-90s     (I48.91) Atrial fibrillation, unspecified type (H)  Continue apixaban 5 mg twice daily, no change     (R62.7) Failure to thrive in adult  Severe dysphagia  PEG placed on 7/28, per continued diarrhea dietitian will change to promote tube feeds, continue free water 200 cc 4 times daily.  Continue mirtazapine 15 mg at bedtime.  Speech following, currently NPO, may need swallow study     Diarrhea per tube feeds and antibiotic  Hx of colitis  Dietitian to increase banatrol, also received Culturelle 10 billion cell count 1 tab three times daily x 1wk per abx, complete on 8/17. C-diff negative.  See above     (R52) Pain  neuropathy  Using Tylenol 1000 mg 3 times daily, lidocaine patch on abdomen.  Continue gabapentin 300 mg in a.m. and 600 mg at at bedtime.  Pain improved     Renal fx  Last creatinine 0.52 with GFR >90 on 8/15     Acute hypokalemia  Last 3.2 on 8/15, increase potassium to 20mEq daily, recheck BMP on 8/19     (D72.829) Leukocytosis, unspecified type  Last WBC 11.8 on 8/15 (improving)     (D64.9)  Anemia, unspecified type  Last hemoglobin 9.4 on 8/15     Dizziness  Continue meclizine 25 mg twice daily, still dizzy per hypotension     (F51.02) Adjustment insomnia  Using melatonin 6 mg at bedtime, adequate     Therapy  weakness  Strengthening, becomes orthostatic. Limited progress    Orders:  Increase potassium, TF change, BMP recheck    Electronically signed by: Patric Sutton NP

## 2022-08-16 NOTE — PROGRESS NOTES
[facility]  INITIAL VISIT NOTE  August 17, 2022    PRIMARY CARE PROVIDER AND CLINIC:  Grecia Danny Patric [address] [facility] [address] / [place] [address]    CHIEF COMPLAINT:  Hospital follow-up/Initial visit    HPI:    Efrem Ramos is a 77 year old  (1945) male who [facility] [place] [facility], 20[facility] for an initial visit.     Medical history is notable for CAD, aortic stenosis, mitral stenosis, permanent atrial fibrillation, hypertension, CVA, COPD, MEL, PVD, dyslipidemia, vocal cord cancer, shingles, pneumonia, MGUS, chronic anemia, collagenous colitis, carpal tunnel syndrome, depression, and ADEM.    Summary of hospital course:  Patient was hospitalized at [facility] from July 21 through August 5, 2022 for aspiration pneumonia.  He originally presented with hypotension.  EKG showed atrial fibrillation with heart of 80 bpm, incomplete RBBB, and left anterior fascicular block.  BNP was 4554.  Troponin I was 20.  Procalcitonin level was less than 0.05.  Hematology profile was significant for white count of 10 and hemoglobin of 10.4.  UA was negative.  Chest CT revealed new bilateral groundglass opacities most suggestive of atypical multifocal pneumonia as well as background fibrosis and interstitial lung disease.  Screening for COVID-19 was negative.  Legionella and Streptococcus pneumoniae antigen was negative.  He was treated with ceftriaxone and doxycycline, completed on July 29.  Patient had spike of fever on July 31.  Pulmonology service was therefore consulted.  Reviewing his CT scan indicated possible chronic aspiration.  Repeat CT scan on August 2 showed moderately extensive right lower lobe infiltrate and mild-moderate peripheral and basilar fibrosis most compatible with UIP.  He was placed back on antibiotic therapy with clindamycin for 1 more week.  Notably, echocardiogram on July 22 showed LV ejection fraction of 60-70%, normal RV systolic function,  severe aortic stenosis with  SEBASTIAN of 0.75 cm , severe tricuspid valve regurgitation, mild mitral stenosis, mild mitral regurgitation, and severe biatrial enlargement.  He underwent laparoscopic assisted placement of gastrostomy tube on August 28 for failure to thrive and dysphagia.  Cardiology was consulted and felt TAVR would be likely difficult but recommended follow-up as outpatient.  TCU was recommended per therapies.    Patient is admitted to this facility for medical management, nursing care, and rehab.     Of note, history was obtained from patient, facility RN, and extensive review of the chart.    Today's visit:  Patient was seen in his room, while lying bed.  He appears extremely frail.  He complains of watery diarrhea multiple times a day.  His blood pressure is running soft.  He feels lightheaded when he works with therapists.  He does endorse wheezing but no fever, chills, cough, sputum, chest pain, palpitation, dyspnea, nausea, vomiting, abdominal pain, or urinary symptoms.  He is a strict n.p.o. and is on gastric feeding.      CODE STATUS:   CPR/Full code     PAST MEDICAL HISTORY:   Aspiration pneumonia in July 2022  Dysphagia and failure to thrive, s/p laparoscopic assisted placement of gastrostomy tube on July 28, 2022  Pulmonary fibrosis/UIP  COPD  Single-vessel CAD, s/p drug-eluting stents x4  from proximal to distal RCA on May 18, 2020  Post-cardiac cath left inguinal and scrotal hematoma with hemorrhagic shock in May 2020  Hypertension  Dyslipidemia  Prominent atrial fibrillation, on Eliquis  CVA  PAD (obstructive disease in the right common iliac artery)  Severe aortic stenosis  Severe tricuspid regurgitation  Mild mitral stenosis and regurgitation  MGUS  Chronic anemia, baseline hemoglobin 9-11  Vocal cord cancer  Shingles  Pneumonia  Carpal tunnel syndrome  ADEM (acute disseminated encephalomyelitis)  Mild cognitive impairment  Depression  Collagenous colitis  MEL,  noncompliant with CPAP for  2 years due to intolerance    Past Medical History:   Diagnosis Date     Atrial fibrillation (H)     amiodarone therapy discontinued due to pulmonary toxicity     Atrial flutter (H)      Benign essential hypertension 11/20/2018     Cancer (H) vocal cord     Carpal tunnel syndrome     abstracted 7/3/02.     Coronary artery disease involving native coronary artery of native heart without angina pectoris 05/08/2020    Cath 5/28/2020: patent stents; Cath 5/18/2020: ISABEL x4 to RCA; Cath 3/2020: 1 vessel disease; Cath 2017: moderate 2 vessel disease     CVA (cerebral infarction) 05/05/2015     Demyelinating disease of central nervous system, unspecified (H)     abstracted 7/3/02. 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     Pneumonia 08/17/2016     PVD (peripheral vascular disease) (H)      Redundant colon     needs CT colonography     Shingles      SKIN DISORDERS NEC 03/15/2005     Sleep apnea      Sleep apnea      SVT (supraventricular tachycardia) (H)        PAST SURGICAL HISTORY:   Past Surgical History:   Procedure Laterality Date     BIOPSY  brain 2002     BONE MARROW BIOPSY, BONE SPECIMEN, NEEDLE/TROCAR N/A 6/8/2017    Procedure: BIOPSY BONE MARROW;  UNILATERAL BONE MARROW BIOPSY (CONSCIOUS SEDATION) ;  Surgeon: Jamie Gonzales MD;  Location:  GI     BONE MARROW  BIOPSY, BONE SPECIMEN, NEEDLE/TROCAR N/A 2/23/2022    Procedure: BIOPSY, BONE MARROW;  Surgeon: Carmelita Aguilera MD;  Location:  GI     CARDIAC CATHERIZATION  09/05/2017    2V CAD, IFR of RCA 0.95     CV CORONARY ANGIOGRAM N/A 3/23/2020    Procedure: Coronary Angiogram and right heart cath;  Surgeon: Gino Ferrer MD;  Location:  HEART CARDIAC CATH LAB     CV HEART CATHETERIZATION WITH POSSIBLE INTERVENTION N/A 5/28/2020    Procedure: Heart Catheterization with Possible Intervention;  Surgeon: Larry Chawla MD;  Location:  HEART CARDIAC CATH LAB     CV HEART CATHETERIZATION WITH POSSIBLE INTERVENTION N/A 5/18/2020    Procedure: Heart Catheterization with Possible Intervention;  Surgeon: Gaurang Swenson MD;  Location:  HEART CARDIAC CATH LAB     CV INSTANTANEOUS WAVE-FREE RATIO N/A 3/23/2020    Procedure: Instantaneous Wave-Free Ratio;  Surgeon: Gino Ferrer MD;  Location:  HEART CARDIAC CATH LAB     CV PCI ATHERECTOMY ORBITAL N/A 5/18/2020    Procedure: Percutaneous Coronary Intervention Atherectomy Rotational;  Surgeon: Gaurang Swenson MD;  Location:  HEART CARDIAC CATH LAB     CV PCI STENT DRUG ELUTING N/A 5/18/2020    Procedure: Percutaneous Coronary Intervention Stent Drug Eluting;  Surgeon: Gaurang Swenson MD;  Location:  HEART CARDIAC CATH LAB     CV RIGHT HEART CATH MEASUREMENTS RECORDED N/A 5/28/2020    Procedure: Right Heart Cath;  Surgeon: Larry Chawla MD;  Location:  HEART CARDIAC CATH LAB     CV TEMPORARY PACEMAKER INSERTION N/A 5/18/2020    Procedure: Temporary Pacemaker Insertion;  Surgeon: Gaurang Swenson MD;  Location:  HEART CARDIAC CATH LAB     ESOPHAGOSCOPY, GASTROSCOPY, DUODENOSCOPY (EGD), COMBINED N/A 9/8/2018    Procedure: COMBINED ESOPHAGOSCOPY, GASTROSCOPY, DUODENOSCOPY (EGD), BIOPSY SINGLE OR MULTIPLE;;  Surgeon: Xander Marie MD;  Location:  GI     HEAD & NECK SURGERY       IR GASTROSTOMY TUBE  PERCUTANEOUS PLCMNT  2022     LAPAROSCOPIC ASSISTED INSERTION TUBE GASTROTOMY N/A 2022    Procedure: LAPAROSCOPIC ASSISTED GASTROSTOMY TUBE PLACEMENT;  Surgeon: Vicente Weber MD;  Location:  OR     ORTHOPEDIC SURGERY      right arm ulna reset after injury     THORACOSCOPIC WEDGE RESECTION LUNG Right 2016    Procedure: THORACOSCOPIC WEDGE RESECTION LUNG;  Surgeon: Abdelrahman Noriega MD;  Location:  OR     ZZC NONSPECIFIC PROCEDURE  as a child    T & A. abstracted 7/3/02.     ZZC NONSPECIFIC PROCEDURE  early    CTR. abstracted 7/3/02.       FAMILY HISTORY:   Family History   Problem Relation Age of Onset     Blood Disease Mother         Anemia     Cardiovascular Father      Cancer - colorectal Maternal Grandfather      Hypertension Brother      Diabetes Sister 5        Juvinile Diabetes passed at 36     Respiratory Other         Lung Cancer       SOCIAL HISTORY:  Social History     Tobacco Use     Smoking status: Former Smoker     Packs/day: 1.00     Years: 30.00     Pack years: 30.00     Types: Cigarettes     Quit date: 2013     Years since quittin.0     Smokeless tobacco: Never Used   Substance Use Topics     Alcohol use: Not Currently     Alcohol/week: 42.0 standard drinks     Types: 42 Standard drinks or equivalent per week       MEDICATIONS:  Current Outpatient Medications   Medication Sig Dispense Refill     acetaminophen (TYLENOL) 500 MG tablet Take 1,000 mg by mouth 3 times daily       apixaban ANTICOAGULANT (ELIQUIS) 5 MG tablet Take 1 tablet (5 mg) by mouth 2 times daily 180 tablet 3     atorvastatin (LIPITOR) 40 MG tablet Take 1 tablet (40 mg) by mouth daily (Patient taking differently: Take 40 mg by mouth every evening) 90 tablet 1     calcium carbonate 600 mg-vitamin D 400 units (CALTRATE) 600-400 MG-UNIT per tablet Take 1 tablet by mouth 2 times daily        clindamycin (CLEOCIN) 75 MG/5ML solution 30 mLs (450 mg) by Per G Tube route 3 times daily (Patient taking  differently: 450 mg by Per G Tube route 3 times daily Complete on 8/12) 630 mL 0     clopidogrel (PLAVIX) 75 MG tablet Take 1 tablet (75 mg) by mouth daily (Patient taking differently: Take 75 mg by mouth every evening) 90 tablet 1     diclofenac (VOLTAREN) 1 % topical gel Apply 4 g topically 4 times daily as needed for moderate pain (Patient not taking: Reported on 7/21/2022) 150 g 11     digoxin (LANOXIN) 125 MCG tablet Take 1 tablet (125 mcg) by mouth daily 90 tablet 3     gabapentin (NEURONTIN) 300 MG capsule Take 300 mg by mouth every morning       gabapentin (NEURONTIN) 300 MG capsule Take 2 capsules (600 mg) by mouth At Bedtime 180 capsule 3     ipratropium - albuterol 0.5 mg/2.5 mg/3 mL (DUONEB) 0.5-2.5 (3) MG/3ML neb solution Take 1 vial (3 mLs) by nebulization every 4 hours as needed for wheezing or shortness of breath / dyspnea 90 mL      Lidocaine (LIDOCARE) 4 % Patch Place 1 patch onto the skin every 24 hours To prevent lidocaine toxicity, patient should be patch free for 12 hrs daily. Apply to abdomen       meclizine (ANTIVERT) 25 MG tablet Take 1 tablet (25 mg) by mouth 2 times daily       melatonin 3 MG tablet Take 6 mg by mouth At Bedtime       mirtazapine (REMERON) 15 MG tablet Take 1 tablet (15 mg) by mouth At Bedtime 90 tablet 3     multivitamin (OCUVITE) TABS Take 1 tablet by mouth daily        potassium chloride ER (K-TAB/KLOR-CON) 10 MEQ CR tablet Take 10 mEq by mouth daily       senna-docusate (SENOKOT-S/PERICOLACE) 8.6-50 MG tablet Take 2 tablets by mouth 2 times daily as needed for constipation         Post Medication Reconciliation Status: Reconciled and completed as per notes/orders.        ALLERGIES:  Allergies   Allergen Reactions     Sulfa Drugs Difficulty breathing, Swelling and Hives     Adhesive Tape Blisters     Amiodarone Other (See Comments)     Developed pleural effusion     Cephalosporins      Tolerated ceftriaxone      Penicillins Hives     Reaction occurred as a  "child  Tolerated ceftriaxone in past        ROS:  10 point ROS were negative other than the symptoms noted above in the HPI.    PHYSICAL EXAM:  Vital signs were reviewed in the chart.  Vital Signs: BP 95/65   Pulse 75   Temp 97.6  F (36.4  C)   Resp 18   Ht 1.702 m (5' 7\")   Wt 57.2 kg (126 lb 1.6 oz)   SpO2 96%   BMI 19.75 kg/m    General: Extremely frail appearing but in no acute distress   HEENT: Conjunctival pallor, no scleral icterus or injection, dry oral mucosa  Cardiovascular: Normal S1, S2, irregularly irregular rhythm  Respiratory: Lungs clear to auscultation bilaterally  GI: Abdomen soft, non-tender, non-distended, +BS, G-tube in place  Extremities: No LE edema  Neuro: CX II-XII grossly intact; ROM in all four extremities grossly intact  Psych: Alert and oriented x3; normal affect  Skin: No acute rash    LABORATORY/IMAGING DATA:  All relevant labs and imaging data in [facility] and/or Care Everywhere were personally reviewed today.      Most Recent 3 CBC's:Recent Labs   Lab Test 08/15/22  0545 08/09/22  1210 08/05/22  0732   WBC 11.8* 14.1* 15.2*   HGB 9.4* 9.2* 9.8*    98 97    469* 420     Most Recent 3 BMP's:Recent Labs   Lab Test 08/15/22  0545 08/10/22  0903 08/09/22  1210    136 139   POTASSIUM 3.2* 2.9* 2.7*   CHLORIDE 105 102 103   CO2 28 30 32   BUN 17 17 18   CR 0.52* 0.55* 0.61*   ANIONGAP 4 4 4   ULISSES 8.4* 8.0* 8.4*   GLC 87 107* 106*     Most Recent 2 LFT's:Recent Labs   Lab Test 07/21/22  1717 06/13/22  1439   AST 23 24   ALT 12 12   ALKPHOS 83 104   BILITOTAL 0.5 0.5         ASSESSMENT/PLAN:  Aspiration pneumonia.  Suspect chronic aspiration.  Patient is afebrile and stable from respiratory standpoint.  He completed the course of clindamycin on August 12.  Plan:  Continue n.p.o.  Monitor fever curve and respiratory status    Severe dysphagia, s/p laparoscopic assisted placement of gastrostomy tube on July 28, 2022  Failure to thrive,  Severe malnutrition. "   Plan:  Continue n.p.o.   Continue enteral feeding with Jevity 1.5 at 50 mL/h  Continue flush with water 4 times a day    Diarrhea,  Dehydration,  Hypotension.  Diarrhea likely due to enteral feeding.  Plan:  Dietary service to change his enteral formula  Increase flushes to 300 mL 4 times a day  Imodium 2 2 mg 3 times daily as needed for diarrhea  Monitor volume status, blood pressure, and bowel output  BMP on August 19 as ordered    Pulmonary fibrosis (likely UIP),  COPD.  Patient is currently saturating at 93%.  Plan:  Continue DuoNeb every 4 hours PRN  Monitor respiratory status  Follow-up with pulmonology in 6 weeks with repeat CT chest    Severe aortic stenosis,  Severe tricuspid regurgitation,  Mild mitral stenosis and regurgitation.  Plan:  Follow-up as outpatient for consideration of TAVR    MGUS,  Chronic anemia.  Baseline hemoglobin 9-11.  Last hemoglobin was 9.2 on August 9.  Plan:  Monitor hemoglobin periodically  Transfuse PRN for hemoglobin less than 7  Follow-up with hematology/oncology as directed    ADEM (acute disseminated encephalomyelitis),  Mild cognitive impairment.  Plan:  Continue gabapentin 300 mg in the morning and 600 mg at bedtime  Staff to assist with daily care mobility  Follow-up with neurology as directed    Single-vessel CAD, s/p 4 drug-eluting stents to RCA on May 18, 2020,  Essential hypertension,  Dyslipidemia.  Blood pressure is currently running low normal.  He is no longer on beta-blocker or BP medications.  Plan:  Continue Plavix 75 mg daily and atorvastatin 40 mg daily  Monitor blood pressure and cardiac status     Permanent atrial fibrillation,  History of CVA in 2015.  Heart rate is currently in the range of .  Plan:  Continue anticoagulation with apixaban 5 mg twice daily  Continue digoxin 125 mg daily  Monitor heart rate    Depression.  Chronic.  Plan:  Continue mirtazapine 15 mg at bedtime    Hypokalemia.  Last potassium level was 3.2 on August  15.  Plan:  Continue potassium chloride 10 mEq daily  Continue to monitor potassium level    MEL.  Noncompliant with CPAP for 2 years due to intolerance.  Plan:  Monitor O2 sats     Physical deconditioning.  Plan:  Continue PT/OT evaluation and therapy            Orders written by provider at facility:  Increase flush with water to 300 mL 4 times daily  Loperamide 2 mg 4 times daily as needed for diarrhea            Disclaimer: This note may contain text created using speech-recognition software and may contain unintended word substitutions.      Electronically signed by:  Jaycob Naqvi MD

## 2022-08-17 NOTE — LETTER
8/17/2022        RE: Efrem Ramos  [address]  [place] [address]        [facility]  INITIAL VISIT NOTE  August 17, 2022    PRIMARY CARE PROVIDER AND CLINIC:  Danny Paige [address] [facility] [address] / [place] [address]    CHIEF COMPLAINT:  Hospital follow-up/Initial visit    HPI:    Efrem Ramos is a 77 year old  (1945) male who [facility], [facility] for an initial visit.     Medical history is notable for CAD, aortic stenosis, mitral stenosis, permanent atrial fibrillation, hypertension, CVA, COPD, MEL, PVD, dyslipidemia, vocal cord cancer, shingles, pneumonia, MGUS, chronic anemia, collagenous colitis, carpal tunnel syndrome, depression, and ADEM.    Summary of hospital course:  Patient was hospitalized at [facility] from July 21 through August 5, 2022 for aspiration pneumonia.  He originally presented with hypotension.  EKG showed atrial fibrillation with heart of 80 bpm, incomplete RBBB, and left anterior fascicular block.  BNP was 4554.  Troponin I was 20.  Procalcitonin level was less than 0.05.  Hematology profile was significant for white count of 10 and hemoglobin of 10.4.  UA was negative.  Chest CT revealed new bilateral groundglass opacities most suggestive of atypical multifocal pneumonia as well as background fibrosis and interstitial lung disease.  Screening for COVID-19 was negative.  Legionella and Streptococcus pneumoniae antigen was negative.  He was treated with ceftriaxone and doxycycline, completed on July 29.  Patient had spike of fever on July 31.  Pulmonology service was therefore consulted.  Reviewing his CT scan indicated possible chronic aspiration.  Repeat CT scan on August 2 showed moderately extensive right lower lobe infiltrate and mild-moderate peripheral and basilar fibrosis most compatible with UIP.  He was placed back on antibiotic therapy with clindamycin for 1 more week.  Notably,  echocardiogram on July 22 showed LV ejection fraction of 60-70%, normal RV systolic function, severe aortic stenosis with  SEBASTIAN of 0.75 cm , severe tricuspid valve regurgitation, mild mitral stenosis, mild mitral regurgitation, and severe biatrial enlargement.  He underwent laparoscopic assisted placement of gastrostomy tube on August 28 for failure to thrive and dysphagia.  Cardiology was consulted and felt TAVR would be likely difficult but recommended follow-up as outpatient.  TCU was recommended per therapies.    Patient is admitted to this facility for medical management, nursing care, and rehab.     Of note, history was obtained from patient, facility RN, and extensive review of the chart.    Today's visit:  Patient was seen in his room, while lying bed.  He appears extremely frail.  He complains of watery diarrhea multiple times a day.  His blood pressure is running soft.  He feels lightheaded when he works with therapists.  He does endorse wheezing but no fever, chills, cough, sputum, chest pain, palpitation, dyspnea, nausea, vomiting, abdominal pain, or urinary symptoms.  He is a strict n.p.o. and is on gastric feeding.      CODE STATUS:   CPR/Full code     PAST MEDICAL HISTORY:   Aspiration pneumonia in July 2022  Dysphagia and failure to thrive, s/p laparoscopic assisted placement of gastrostomy tube on July 28, 2022  Pulmonary fibrosis/UIP  COPD  Single-vessel CAD, s/p drug-eluting stents x4  from proximal to distal RCA on May 18, 2020  Post-cardiac cath left inguinal and scrotal hematoma with hemorrhagic shock in May 2020  Hypertension  Dyslipidemia  Prominent atrial fibrillation, on Eliquis  CVA  PAD (obstructive disease in the right common iliac artery)  Severe aortic stenosis  Severe tricuspid regurgitation  Mild mitral stenosis and regurgitation  MGUS  Chronic anemia, baseline hemoglobin 9-11  Vocal cord cancer  Shingles  Pneumonia  Carpal tunnel syndrome  ADEM (acute disseminated  encephalomyelitis)  Mild cognitive impairment  Depression  Collagenous colitis  MEL,  noncompliant with CPAP for 2 years due to intolerance    Past Medical History:   Diagnosis Date     Atrial fibrillation (H)     amiodarone therapy discontinued due to pulmonary toxicity     Atrial flutter (H)      Benign essential hypertension 11/20/2018     Cancer (H) vocal cord     Carpal tunnel syndrome     abstracted 7/3/02.     Coronary artery disease involving native coronary artery of native heart without angina pectoris 05/08/2020    Cath 5/28/2020: patent stents; Cath 5/18/2020: ISABEL x4 to RCA; Cath 3/2020: 1 vessel disease; Cath 2017: moderate 2 vessel disease     CVA (cerebral infarction) 05/05/2015     Demyelinating disease of central nervous system, unspecified (H)     abstracted 7/3/02. 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     Pneumonia 08/17/2016     PVD (peripheral vascular disease) (H)      Redundant colon     needs CT colonography     Shingles      SKIN DISORDERS NEC 03/15/2005     Sleep apnea      Sleep apnea      SVT (supraventricular tachycardia) (H)        PAST SURGICAL HISTORY:   Past Surgical History:   Procedure Laterality Date     BIOPSY  brain 2002     BONE MARROW BIOPSY, BONE SPECIMEN, NEEDLE/TROCAR N/A 6/8/2017    Procedure: BIOPSY BONE MARROW;   UNILATERAL BONE MARROW BIOPSY (CONSCIOUS SEDATION) ;  Surgeon: Jamie Gonzales MD;  Location:  GI     BONE MARROW BIOPSY, BONE SPECIMEN, NEEDLE/TROCAR N/A 2/23/2022    Procedure: BIOPSY, BONE MARROW;  Surgeon: Carmelita Aguilera MD;  Location:  GI     CARDIAC CATHERIZATION  09/05/2017    2V CAD, IFR of RCA 0.95     CV CORONARY ANGIOGRAM N/A 3/23/2020    Procedure: Coronary Angiogram and right heart cath;  Surgeon: Gino Ferrer MD;  Location:  HEART CARDIAC CATH LAB     CV HEART CATHETERIZATION WITH POSSIBLE INTERVENTION N/A 5/28/2020    Procedure: Heart Catheterization with Possible Intervention;  Surgeon: Larry Chawla MD;  Location:  HEART CARDIAC CATH LAB     CV HEART CATHETERIZATION WITH POSSIBLE INTERVENTION N/A 5/18/2020    Procedure: Heart Catheterization with Possible Intervention;  Surgeon: Gaurang Swenson MD;  Location:  HEART CARDIAC CATH LAB     CV INSTANTANEOUS WAVE-FREE RATIO N/A 3/23/2020    Procedure: Instantaneous Wave-Free Ratio;  Surgeon: Gino Ferrer MD;  Location:  HEART CARDIAC CATH LAB     CV PCI ATHERECTOMY ORBITAL N/A 5/18/2020    Procedure: Percutaneous Coronary Intervention Atherectomy Rotational;  Surgeon: Gaurang Swenson MD;  Location:  HEART CARDIAC CATH LAB     CV PCI STENT DRUG ELUTING N/A 5/18/2020    Procedure: Percutaneous Coronary Intervention Stent Drug Eluting;  Surgeon: Gaurang Swenson MD;  Location:  HEART CARDIAC CATH LAB     CV RIGHT HEART CATH MEASUREMENTS RECORDED N/A 5/28/2020    Procedure: Right Heart Cath;  Surgeon: Larry Chawla MD;  Location:  HEART CARDIAC CATH LAB     CV TEMPORARY PACEMAKER INSERTION N/A 5/18/2020    Procedure: Temporary Pacemaker Insertion;  Surgeon: Gaurang Swenson MD;  Location:  HEART CARDIAC CATH LAB     ESOPHAGOSCOPY, GASTROSCOPY, DUODENOSCOPY (EGD), COMBINED N/A 9/8/2018    Procedure: COMBINED ESOPHAGOSCOPY, GASTROSCOPY, DUODENOSCOPY (EGD), BIOPSY SINGLE  OR MULTIPLE;;  Surgeon: Xander Marie MD;  Location:  GI     HEAD & NECK SURGERY       IR GASTROSTOMY TUBE PERCUTANEOUS PLCMNT  2022     LAPAROSCOPIC ASSISTED INSERTION TUBE GASTROTOMY N/A 2022    Procedure: LAPAROSCOPIC ASSISTED GASTROSTOMY TUBE PLACEMENT;  Surgeon: Vicente Weber MD;  Location:  OR     ORTHOPEDIC SURGERY      right arm ulna reset after injury     THORACOSCOPIC WEDGE RESECTION LUNG Right 2016    Procedure: THORACOSCOPIC WEDGE RESECTION LUNG;  Surgeon: Abdelrahman Noriega MD;  Location:  OR     C NONSPECIFIC PROCEDURE  as a child    T & A. abstracted 7/3/02.     ZZC NONSPECIFIC PROCEDURE  early    CTR. abstracted 7/3/02.       FAMILY HISTORY:   Family History   Problem Relation Age of Onset     Blood Disease Mother         Anemia     Cardiovascular Father      Cancer - colorectal Maternal Grandfather      Hypertension Brother      Diabetes Sister 5        Juvinile Diabetes passed at 36     Respiratory Other         Lung Cancer       SOCIAL HISTORY:  Social History     Tobacco Use     Smoking status: Former Smoker     Packs/day: 1.00     Years: 30.00     Pack years: 30.00     Types: Cigarettes     Quit date: 2013     Years since quittin.0     Smokeless tobacco: Never Used   Substance Use Topics     Alcohol use: Not Currently     Alcohol/week: 42.0 standard drinks     Types: 42 Standard drinks or equivalent per week       MEDICATIONS:  Current Outpatient Medications   Medication Sig Dispense Refill     acetaminophen (TYLENOL) 500 MG tablet Take 1,000 mg by mouth 3 times daily       apixaban ANTICOAGULANT (ELIQUIS) 5 MG tablet Take 1 tablet (5 mg) by mouth 2 times daily 180 tablet 3     atorvastatin (LIPITOR) 40 MG tablet Take 1 tablet (40 mg) by mouth daily (Patient taking differently: Take 40 mg by mouth every evening) 90 tablet 1     calcium carbonate 600 mg-vitamin D 400 units (CALTRATE) 600-400 MG-UNIT per tablet Take 1 tablet by mouth 2 times daily         clindamycin (CLEOCIN) 75 MG/5ML solution 30 mLs (450 mg) by Per G Tube route 3 times daily (Patient taking differently: 450 mg by Per G Tube route 3 times daily Complete on 8/12) 630 mL 0     clopidogrel (PLAVIX) 75 MG tablet Take 1 tablet (75 mg) by mouth daily (Patient taking differently: Take 75 mg by mouth every evening) 90 tablet 1     diclofenac (VOLTAREN) 1 % topical gel Apply 4 g topically 4 times daily as needed for moderate pain (Patient not taking: Reported on 7/21/2022) 150 g 11     digoxin (LANOXIN) 125 MCG tablet Take 1 tablet (125 mcg) by mouth daily 90 tablet 3     gabapentin (NEURONTIN) 300 MG capsule Take 300 mg by mouth every morning       gabapentin (NEURONTIN) 300 MG capsule Take 2 capsules (600 mg) by mouth At Bedtime 180 capsule 3     ipratropium - albuterol 0.5 mg/2.5 mg/3 mL (DUONEB) 0.5-2.5 (3) MG/3ML neb solution Take 1 vial (3 mLs) by nebulization every 4 hours as needed for wheezing or shortness of breath / dyspnea 90 mL      Lidocaine (LIDOCARE) 4 % Patch Place 1 patch onto the skin every 24 hours To prevent lidocaine toxicity, patient should be patch free for 12 hrs daily. Apply to abdomen       meclizine (ANTIVERT) 25 MG tablet Take 1 tablet (25 mg) by mouth 2 times daily       melatonin 3 MG tablet Take 6 mg by mouth At Bedtime       mirtazapine (REMERON) 15 MG tablet Take 1 tablet (15 mg) by mouth At Bedtime 90 tablet 3     multivitamin (OCUVITE) TABS Take 1 tablet by mouth daily        potassium chloride ER (K-TAB/KLOR-CON) 10 MEQ CR tablet Take 10 mEq by mouth daily       senna-docusate (SENOKOT-S/PERICOLACE) 8.6-50 MG tablet Take 2 tablets by mouth 2 times daily as needed for constipation         Post Medication Reconciliation Status: Reconciled and completed as per notes/orders.        ALLERGIES:  Allergies   Allergen Reactions     Sulfa Drugs Difficulty breathing, Swelling and Hives     Adhesive Tape Blisters     Amiodarone Other (See Comments)     Developed pleural  "effusion     Cephalosporins      Tolerated ceftriaxone      Penicillins Hives     Reaction occurred as a child  Tolerated ceftriaxone in past        ROS:  10 point ROS were negative other than the symptoms noted above in the HPI.    PHYSICAL EXAM:  Vital signs were reviewed in the chart.  Vital Signs: BP 95/65   Pulse 75   Temp 97.6  F (36.4  C)   Resp 18   Ht 1.702 m (5' 7\")   Wt 57.2 kg (126 lb 1.6 oz)   SpO2 96%   BMI 19.75 kg/m    General: Extremely frail appearing but in no acute distress   HEENT: Conjunctival pallor, no scleral icterus or injection, dry oral mucosa  Cardiovascular: Normal S1, S2, irregularly irregular rhythm  Respiratory: Lungs clear to auscultation bilaterally  GI: Abdomen soft, non-tender, non-distended, +BS, G-tube in place  Extremities: No LE edema  Neuro: CX II-XII grossly intact; ROM in all four extremities grossly intact  Psych: Alert and oriented x3; normal affect  Skin: No acute rash    LABORATORY/IMAGING DATA:  All relevant labs and imaging data in [facility] and/or Care Everywhere were personally reviewed today.      Most Recent 3 CBC's:Recent Labs   Lab Test 08/15/22  0545 08/09/22  1210 08/05/22  0732   WBC 11.8* 14.1* 15.2*   HGB 9.4* 9.2* 9.8*    98 97    469* 420     Most Recent 3 BMP's:Recent Labs   Lab Test 08/15/22  0545 08/10/22  0903 08/09/22  1210    136 139   POTASSIUM 3.2* 2.9* 2.7*   CHLORIDE 105 102 103   CO2 28 30 32   BUN 17 17 18   CR 0.52* 0.55* 0.61*   ANIONGAP 4 4 4   ULISSES 8.4* 8.0* 8.4*   GLC 87 107* 106*     Most Recent 2 LFT's:Recent Labs   Lab Test 07/21/22  1717 06/13/22  1439   AST 23 24   ALT 12 12   ALKPHOS 83 104   BILITOTAL 0.5 0.5         ASSESSMENT/PLAN:  Aspiration pneumonia.  Suspect chronic aspiration.  Patient is afebrile and stable from respiratory standpoint.  He completed the course of clindamycin on August 12.  Plan:  Continue n.p.o.  Monitor fever curve and respiratory status    Severe dysphagia, s/p laparoscopic assisted " placement of gastrostomy tube on July 28, 2022  Failure to thrive,  Severe malnutrition.   Plan:  Continue n.p.o.   Continue enteral feeding with Jevity 1.5 at 50 mL/h  Continue flush with water 4 times a day    Diarrhea,  Dehydration,  Hypotension.  Diarrhea likely due to enteral feeding.  Plan:  Dietary service to change his enteral formula  Increase flushes to 300 mL 4 times a day  Imodium 2 2 mg 3 times daily as needed for diarrhea  Monitor volume status, blood pressure, and bowel output  BMP on August 19 as ordered    Pulmonary fibrosis (likely UIP),  COPD.  Patient is currently saturating at 93%.  Plan:  Continue DuoNeb every 4 hours PRN  Monitor respiratory status  Follow-up with pulmonology in 6 weeks with repeat CT chest    Severe aortic stenosis,  Severe tricuspid regurgitation,  Mild mitral stenosis and regurgitation.  Plan:  Follow-up as outpatient for consideration of TAVR    MGUS,  Chronic anemia.  Baseline hemoglobin 9-11.  Last hemoglobin was 9.2 on August 9.  Plan:  Monitor hemoglobin periodically  Transfuse PRN for hemoglobin less than 7  Follow-up with hematology/oncology as directed    ADEM (acute disseminated encephalomyelitis),  Mild cognitive impairment.  Plan:  Continue gabapentin 300 mg in the morning and 600 mg at bedtime  Staff to assist with daily care mobility  Follow-up with neurology as directed    Single-vessel CAD, s/p 4 drug-eluting stents to RCA on May 18, 2020,  Essential hypertension,  Dyslipidemia.  Blood pressure is currently running low normal.  He is no longer on beta-blocker or BP medications.  Plan:  Continue Plavix 75 mg daily and atorvastatin 40 mg daily  Monitor blood pressure and cardiac status     Permanent atrial fibrillation,  History of CVA in 2015.  Heart rate is currently in the range of .  Plan:  Continue anticoagulation with apixaban 5 mg twice daily  Continue digoxin 125 mg daily  Monitor heart rate    Depression.  Chronic.  Plan:  Continue mirtazapine 15  mg at bedtime    Hypokalemia.  Last potassium level was 3.2 on August 15.  Plan:  Continue potassium chloride 10 mEq daily  Continue to monitor potassium level    MEL.  Noncompliant with CPAP for 2 years due to intolerance.  Plan:  Monitor O2 sats     Physical deconditioning.  Plan:  Continue PT/OT evaluation and therapy            Orders written by provider at facility:  Increase flush with water to 300 mL 4 times daily  Loperamide 2 mg 4 times daily as needed for diarrhea            Disclaimer: This note may contain text created using speech-recognition software and may contain unintended word substitutions.      Electronically signed by:  Jaycob Naqvi MD                          Sincerely,        Jaycob Naqvi MD

## 2022-08-18 PROBLEM — K59.1 FUNCTIONAL DIARRHEA: Status: ACTIVE | Noted: 2022-01-01

## 2022-08-18 NOTE — PROGRESS NOTES
"[facility] GERIATRICS    Chief Complaint   Patient presents with     RECHECK     HPI:  Efrem Ramos is a 77 year old  (1945), who is being seen today for an episodic care visit at: [facility] [[id number]].     This is a 78 yo male with a PMHx of Htn, PVD, CAD, MGUS, Aortic stenosis, CVA, chronic anemia who presented with asymptomatic hypotension.  Imaging found new groundglass opacities on CT, subjective of atypical multifocal pneumonia, started on antibiotics and completed on 7/29, developed fever, pulmonology consulted.  CT more looking like chronic aspiration, continued on clindamycin for 1 more week with repeat CT in 6 weeks with PCP.  Per severe dysphagia underwent G-tube placement on 7/28 with tube feeds.  Per hypertension, random cortisol level normal, chronic issue.  Per cardiac disease, echo showed EF 60 to 65%, severe low-flow low gradient aortic valve stenosis with a mean gradient of 23-25, with severe tricuspid regurg, evaluated by cardiology on 7/22 for possible TAVR, will evaluate outpatient.  No other changes, discharged to [facility] for rehab.    Today's concern is:   Loose stools, hypokalemia, therapy progress    Allergies, and PMH/PSH reviewed in [facility] today.  REVIEW OF SYSTEMS:  4 point ROS including Respiratory, CV, GI and , other than that noted in the HPI,  is negative    Objective:   BP 94/61   Pulse 101   Temp 98.1  F (36.7  C)   Resp 18   Ht 1.702 m (5' 7\")   Wt 57.2 kg (126 lb 1.6 oz)   SpO2 96%   BMI 19.75 kg/m    GENERAL APPEARANCE:  Alert, oriented, thin, denies pain  RESP:  no respiratory distress, diminished breath sounds bilaterally, RA, non productive cough  CV:  IRR, 3/6 PRAFUL 2ICS, no rub or gallop, no edema  ABDOMEN:  normal bowel sounds, soft, nontender, no hepatosplenomegaly or other masses, has periodic loose stools, PEG patent, TF  PSYCH:  oriented X 3, memory impaired. SLUMS 22/30, safety Q 18/22      Most Recent 3 CBC's:  Recent Labs   Lab Test " 08/15/22  0545 08/09/22  1210 08/05/22  0732   WBC 11.8* 14.1* 15.2*   HGB 9.4* 9.2* 9.8*    98 97    469* 420     Most Recent 3 BMP's:  Recent Labs   Lab Test 08/15/22  0545 08/10/22  0903 08/09/22  1210    136 139   POTASSIUM 3.2* 2.9* 2.7*   CHLORIDE 105 102 103   CO2 28 30 32   BUN 17 17 18   CR 0.52* 0.55* 0.61*   ANIONGAP 4 4 4   ULISSES 8.4* 8.0* 8.4*   GLC 87 107* 106*       Assessment/Plan:    (J18.9) Community acquired pneumonia, unspecified laterality    Aspiration pneumonia  Severe dysphagia  Received clindamycin until 8/12, recommend follow-up CT in 6 weeks (mid sept) with PCP.  Received Mucinex 600 mg twice daily x5 days, completed on 8/12.  Encourage incentive spirometry. No change     (I73.9) PVD (peripheral vascular disease) (H)  (I10) Benign essential hypertension  CAD  CVA  EF 60-70%  Continue statin and plavix. Continue digoxin 125mcg daily, recheck level on 8/19. SBP 90-100s, HR 60-90s     (I48.91) Atrial fibrillation, unspecified type (H)  Continue apixaban 5 mg twice daily, no change     (R62.7) Failure to thrive in adult  Severe dysphagia  PEG placed on 7/28, per continued diarrhea TF changed to promote with fiber with goal of 75cc/day and FW 200cc q4h  and 200cc BID. Continue mirtazapine 15 mg at bedtime. Speech following, currently NPO, may need swallow study     Diarrhea per tube feeds and antibiotic  Hx of colitis  Received Culturelle 10 billion cell count 1 tab three times daily x 1wk per abx, complete on 8/17. C-diff negative.  See above. Also has loperamide 2mg QID PRN (only 1 dose used)     (R52) Pain  neuropathy  Using Tylenol 1000 mg 3 times daily, lidocaine patch on abdomen.  Continue gabapentin 300 mg in a.m. and 600 mg at at bedtime.  Pain improved     Renal fx  Last creatinine 0.52 with GFR >90 on 8/15, recheck BMP on 8/19     Acute hypokalemia  Last 3.2 on 8/15, using potassium 20mEq daily, recheck BMP on 8/19     (D72.829) Leukocytosis, unspecified type  Last  WBC 11.8 on 8/15 (improving)     (D64.9) Anemia, unspecified type  Last hemoglobin 9.4 on 8/15     Dizziness  Continue meclizine 25 mg twice daily, still dizzy per hypotension     (F51.02) Adjustment insomnia  Using melatonin 6 mg at bedtime, adequate     Therapy  weakness  Strengthening, becomes orthostatic. Limited progress    Orders:  BMP/dig recheck    Electronically signed by: Patric Sutton NP

## 2022-08-22 PROBLEM — G31.84 MCI (MILD COGNITIVE IMPAIRMENT): Status: ACTIVE | Noted: 2022-01-01

## 2022-08-22 PROBLEM — R42 DIZZINESS: Status: ACTIVE | Noted: 2022-01-01

## 2022-08-22 PROBLEM — R63.4 ABNORMAL WEIGHT LOSS: Status: ACTIVE | Noted: 2018-11-14

## 2022-08-25 NOTE — PROGRESS NOTES
[facility] GERIATRICS    Chief Complaint   Patient presents with     RECHECK     HPI:  Efrem Ramos is a 77 year old  (1945), who is being seen today for an episodic care visit at: [facility] (TCU) [[id number]].     This is a 76 yo male with a PMHx of Htn, PVD, CAD, MGUS, Aortic stenosis, CVA, chronic anemia who presented with asymptomatic hypotension.  Imaging found new groundglass opacities on CT, subjective of atypical multifocal pneumonia, started on antibiotics and completed on 7/29, developed fever, pulmonology consulted.  CT more looking like chronic aspiration, continued on clindamycin for 1 more week with repeat CT in 6 weeks with PCP.  Per severe dysphagia underwent G-tube placement on 7/28 with tube feeds.  Per hypertension, random cortisol level normal, chronic issue.  Per cardiac disease, echo showed EF 60 to 65%, severe low-flow low gradient aortic valve stenosis with a mean gradient of 23-25, with severe tricuspid regurg, evaluated by cardiology on 7/22 for possible TAVR, will evaluate outpatient.  No other changes, discharged to [facility] for rehab.    Today's concern is:   CHF, aspiration PNA, hypotension, hospice consult    Allergies, and PMH/PSH reviewed in [facility] today.  REVIEW OF SYSTEMS:  4 point ROS including Respiratory, CV, GI and , other than that noted in the HPI,  is negative    Objective:   /69   Pulse 97   Temp 98.1  F (36.7  C)   Resp 18   Wt 53.8 kg (118 lb 9.6 oz)   SpO2 98%   BMI 18.58 kg/m    GENERAL APPEARANCE:  Alert, oriented, thin, denies pain  RESP:  no respiratory distress, diminished with exp whz, RA, non productive cough  CV:  IRR, 3/6 PRAFUL, no rub or gallop, no edema  ABDOMEN:  normal bowel sounds, soft, nontender, no hepatosplenomegaly or other masses, has periodic loose stools, PEG patent, TF  PSYCH:  oriented X 3, memory impaired. SLUMS 22/30, safety Q 18/22      Most Recent 3 CBC's:  Recent Labs   Lab Test 08/15/22  0545 08/09/22  1210  08/05/22  0732   WBC 11.8* 14.1* 15.2*   HGB 9.4* 9.2* 9.8*    98 97    469* 420     Most Recent 3 BMP's:  Recent Labs   Lab Test 08/19/22  0505 08/15/22  0545 08/10/22  0903    137 136   POTASSIUM 3.8 3.2* 2.9*   CHLORIDE 104 105 102   CO2 27 28 30   BUN 21 17 17   CR 0.50* 0.52* 0.55*   ANIONGAP 3 4 4   ULISSES 8.1* 8.4* 8.0*   * 87 107*     Most Recent 3 BNP's:  Recent Labs   Lab Test 07/21/22  1717 05/29/20  0849 07/30/16  1555   NTBNPI 4,554* 5,428* 3,617*       Assessment/Plan:    (J18.9) Community acquired pneumonia, unspecified laterality    Aspiration pneumonia  Severe dysphagia  CHF, EF 60-70% with severe AS  Received clindamycin until 8/12, recommend follow-up CT in 6 weeks (mid sept) with PCP.  Received Mucinex 600 mg twice daily x5 days, completed on 8/12.  CXR on 8/24 showed probable worsening of aspiration PNA on R side, with larger R sided pleural effusion (HF). Last BNP +4500. Unable to use diuretic tx per hypotension. Hospice consulted     (I73.9) PVD (peripheral vascular disease) (H)  (I10) Benign essential hypertension  CAD  CVA  EF 60-70%  Continue statin and plavix. Continue digoxin 125mcg daily, last level 1.1. SBP 80-100s, HR 60-90s     (I48.91) Atrial fibrillation, unspecified type (H)  Continue apixaban 5 mg twice daily, no change     (R62.7) Failure to thrive in adult  Severe dysphagia  Wt loss  PEG placed on 7/28, per continued diarrhea TF changed to promote with fiber with goal of 75cc/hr and FW 200cc q4h and 200cc BID. Continue mirtazapine 15 mg at bedtime.  Speech following, currently NPO. Probable wt loss (-7lb)?     Diarrhea per tube feeds and antibiotic  Hx of colitis  Received Culturelle 10 billion cell count 1 tab three times daily x 1wk per abx, completed on 8/17. C-diff negative.  See above. Also has loperamide 2mg QID PRN. Improved     (R52) Pain  neuropathy  Using Tylenol 1000 mg 3 times daily, lidocaine patch on abdomen.  Continue gabapentin 300 mg in  a.m. and 600 mg at at bedtime.  Pain improved, no change     Renal fx  Last creatinine 0.50 with GFR >90 on 8/19     Acute hypokalemia  Last 3.8 on 8/19, using potassium 20mEq daily     (D72.829) Leukocytosis, unspecified type  Last WBC 11.8 on 8/15 (improving)     (D64.9) Anemia, unspecified type  Last hemoglobin 9.4 on 8/15     Dizziness  Continue meclizine 25 mg twice daily, still dizzy per hypotension at times, unable to ambulate     (F51.02) Adjustment insomnia  Using melatonin 6 mg at bedtime, adequate. No change     Therapy  weakness  Strengthening, becomes orthostatic. Limited progress. Hospice consult requested, talked with son (Alfredo)    Orders:  Hospice consult    Electronically signed by: Patric Sutton NP

## 2022-08-31 NOTE — PROGRESS NOTES
[facility] GERIATRICS    Chief Complaint   Patient presents with     RECHECK     HPI:  Efrem Ramos is a 77 year old  (1945), who is being seen today for an episodic care visit at: [facility] (TCU) [[id number]].     This is a 78 yo male with a PMHx of Htn, PVD, CAD, MGUS, Aortic stenosis, CVA, chronic anemia who presented with asymptomatic hypotension.  Imaging found new groundglass opacities on CT, subjective of atypical multifocal pneumonia, started on antibiotics and completed on 7/29, developed fever, pulmonology consulted.  CT more looking like chronic aspiration, continued on clindamycin for 1 more week with repeat CT in 6 weeks with PCP.  Per severe dysphagia underwent G-tube placement on 7/28 with tube feeds.  Per hypertension, random cortisol level normal, chronic issue.  Per cardiac disease, echo showed EF 60 to 65%, severe low-flow low gradient aortic valve stenosis with a mean gradient of 23-25, with severe tricuspid regurg, evaluated by cardiology on 7/22 for possible TAVR, will evaluate outpatient.  No other changes, discharged to [facility] for rehab.    Today's concern is:  Aspiration PNA, HF, hospice    Allergies, and PMH/PSH reviewed in [facility] today.  REVIEW OF SYSTEMS:  4 point ROS including Respiratory, CV, GI and , other than that noted in the HPI,  is negative    Objective:   BP 95/64   Pulse 104   Temp 98.6  F (37  C)   Resp 18   Wt 54 kg (119 lb 1.6 oz)   SpO2 96%   BMI 18.65 kg/m    GENERAL APPEARANCE:  Alert, oriented, thin, denies pain  RESP:  no respiratory distress, diminished, rhonchi LLL, non productive cough  CV:  IRR, 3/6 PRAFUL, no rub or gallop, no edema  ABDOMEN:  normal bowel sounds, soft, nontender, no hepatosplenomegaly or other masses, has periodic loose stools, PEG patent, TF  PSYCH:  oriented X 3, memory impaired. SLUMS 22/30, safety Q 18/22      Most Recent 3 CBC's:  Recent Labs   Lab Test 08/31/22  0710 08/15/22  0545 08/09/22  1210   WBC 16.1* 11.8*  14.1*   HGB 8.6* 9.4* 9.2*    100 98   * 450 469*     Most Recent 3 BMP's:  Recent Labs   Lab Test 08/31/22  0710 08/19/22  0505 08/15/22  0545    134 137   POTASSIUM 4.1 3.8 3.2*   CHLORIDE 100 104 105   CO2 26 27 28   BUN 25 21 17   CR 0.50* 0.50* 0.52*   ANIONGAP 7 3 4   ULISSES 8.2* 8.1* 8.4*   * 120* 87     Most Recent 3 BNP's:  Recent Labs   Lab Test 07/21/22  1717 05/29/20  0849 07/30/16  1555   NTBNPI 4,554* 5,428* 3,617*       Assessment/Plan:    (J18.9) Community acquired pneumonia, unspecified laterality    Aspiration pneumonia  Severe dysphagia  CHF, EF 60-70% with severe AS  Received clindamycin until 8/12, recommend follow-up CT in 6 weeks (mid sept) with PCP.  Received Mucinex 600 mg twice daily x5 days, completed on 8/12.  CXR on 8/24 showed possible worsening of aspiration PNA on R side, with larger R sided pleural effusion (HF). Last BNP +4500. Procal negative on 8/31. Unable to use diuretic tx per hypotension. Start albuterol nebs TID x5d until 9/5. Waiting for f/u with cardiology on 9/7 per Dr Xiong, thoracentesis/diuresis/hospice?     (I73.9) PVD (peripheral vascular disease) (H)  (I10) Benign essential hypertension  CAD  CVA  EF 60-70%  Continue statin and plavix. Continue digoxin 125mcg daily, last level 1.1. SBP 80-100s, HR 60-90s     (I48.91) Atrial fibrillation, unspecified type (H)  Continue apixaban 5 mg twice daily, no change     (R62.7) Failure to thrive in adult  Severe dysphagia  Wt loss  PEG placed on 7/28, per continued diarrhea TF changed to promote with fiber with goal of 75cc/hr and FW 200cc q4h and 200cc BID. Continue mirtazapine 15mg at bedtime.  Speech following, currently NPO. 119lb (+2lb)     Diarrhea per tube feeds and antibiotic  Hx of colitis  Received Culturelle 10 billion cell count 1 tab three times daily x 1wk per abx, completed on 8/17. C-diff negative.  See above. Also has loperamide 2mg QID PRN. Improved, still loose     (R52)  Pain  neuropathy  Using Tylenol 1000 mg 3 times daily, lidocaine patch on abdomen.  Continue gabapentin 300 mg in a.m. and 600 mg at at bedtime. Today per ongoing abdominal pain, start oxycodone 2.5mg q6h PRN     Renal fx  Last creatinine 0.50 with GFR >90 on 8/31     Acute hypokalemia  Last 4.1 on 8/31, using potassium 20mEq daily     (D72.829) Leukocytosis, unspecified type  Last WBC 16.1 on 8/31     (D64.9) Anemia, unspecified type  Last hemoglobin 8.6 on 8/31 (decreased). Occult stool, start FeSO4 325mg daily     Dizziness  Continue meclizine 25 mg twice daily, still dizzy per hypotension at times, unable to ambulate     (F51.02) Adjustment insomnia  Using melatonin 6 mg at bedtime, adequate. No change     Therapy  weakness  Strengthening, becomes orthostatic. Limited progress. Hospice consult requested, talked with son (Alfredo), not pursuing yet    Orders:  Occult stool, FeSO4, albuterol nebs, oxycodone    Electronically signed by: Patric Sutton NP

## 2022-09-05 PROBLEM — I48.91 ATRIAL FIBRILLATION WITH RAPID VENTRICULAR RESPONSE (H): Status: ACTIVE | Noted: 2022-01-01

## 2022-09-05 PROBLEM — K52.9 CHRONIC DIARRHEA: Status: ACTIVE | Noted: 2022-01-01

## 2022-09-05 PROBLEM — J69.0 ASPIRATION PNEUMONIA OF RIGHT LOWER LOBE, UNSPECIFIED ASPIRATION PNEUMONIA TYPE (H): Status: ACTIVE | Noted: 2022-01-01

## 2022-09-05 NOTE — TELEPHONE ENCOUNTER
Nurse Triage SBAR    Situation: Son called with concerns regarding patient's ongoing care.    Background: Patient had G-tube placed 7/28/2022 and continues to have extreme diarrhea, low BP, pain and fatigue. Son worried about possible latex exposure as patient has allergy that is not listed on his allergy list. Also worried about lactose intolerance and refeeding syndrome. Reports patient would like to eventually go home but isn't making progress. Son would like to know if colostomy bag would be a solution for better bowel management.    Assessment: Triage not done as son is not with patient.    Protocol Recommended Disposition:   No disposition on file.    Recommendation: Son would like a call back to discuss his concerns. Consent to Communicate on file. Son is POA.  Jamir Alfredo [phone]    Routed to provider per son request.    Adriana Magallanes RN on 9/5/2022 at 4:33 PM      Does the patient meet one of the following criteria for ADS visit consideration? 16+ years old, with an [facility] PCP     TIP  Providers, please consider if this condition is appropriate for management at one of our Acute and Diagnostic Services sites.     If patient is a good candidate, please use dotphrase <dot>triageresponse and select Refer to ADS to document.

## 2022-09-06 NOTE — TELEPHONE ENCOUNTER
I see that Alfredo is in the hospital.  Can we follow-up to see if there are any questions?    Danny Paige MD, MD

## 2022-09-06 NOTE — ED NOTES
Received pt into care. Very weak. GCS 15 mentates appropriately. Coughed up creamy yellow sputum. On 2l02. sats 88%RA RR variable mid 20s. Feeding tube flushed well. Clear return. No abdo distention. Pain on RLQ. No nausea. Denies CP. bw sent awaits labs and admissions.

## 2022-09-06 NOTE — PLAN OF CARE
Pt stable after admission with no change in his current condition.  Tele:Afib CVR, no c/o pain or SOB.  Pt has 1 stool that was sent to r/o C-Diff.  He will be seen by GI today.

## 2022-09-06 NOTE — CONSULTS
[facility]    Cardiology Consultation     Date of Admission:  9/5/2022    Assessment & Plan     77 year old male patient of Brianne Cortes and Tyrese, with PMH CVA, coronary artery disease with extensive RCA stenting, severe low-flow aortic stenosis as well as severe tricuspid regurgitation, atrial fibrillation and atrial flutter on digoxin therapy after amiodarone lung toxicity, monoclonal gammopathy of uncertain significance with ongoing progression being monitored by oncology, failure to thrive with chronic aspiration and recent G-tube placement.     1. AFIB with RVR: likely driven by hypoxia (recurrent aspiration PNA), worsening aortic stenosis and heart failure   -continue digoxin, will allow for permissive tachycardia   -permanent AFIB, unlikely a cardioversion candidate. If needed could reload digoxin IV.   -Blood pressure is too low to allow for other rate controlling agents - beta blocker or calcium channel blocker     2. Low flow low gradient severe aortic stenosis  -will obtain inpatient TAVR work up for consideration: if not a candidate then would consider palliative care - will discuss with Dr. Cortes today, per his recent note during prior hospitalization, patient is a frail patient at increased risk for intervention - would be difficult with a transcatheter approach and will require either shockwave via transfemoral approach or alternative access. Will eval patient for inpatient CT TAVR.  -repeat echocardiogram today  -suspect progressive hypotension is due to worsening valve function     3. Hypotension: suspect aortic stenosis severity contributing more to low blood pressure than diarrhea/dehydration - his JVP is quite elevated today  -avoid aggressive fluid overload, also would use caution with diuresis: try to maintain good balance with po intake     4. Coronary Artery disease: noted during TAVR w/u 5/2020. S/p rotational atherectomy and ISABEL x 4 to prox-distal RCA.  Complicated by hemorrhagic shock from LFA bleeding. 4 units of blood transfused.  -no chest pain at this time.   -on plavix and eliquis     Will continue to follow. Total time spent on complex patient evaluation, review of images and documentation more than 60 minutes.  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 He reports shortness of breath, no chest pain, no leg swelling. When seen today he says he was discussing in the past work up of aortic valve replacement in the past with Dr. Cortes who he is in agreement with only should he be a candidate.     Past Medical history:    1. Severe low flow, low gradient AoS - echo 6/3/2021 with SEBASTIAN 0.5cm2 with mean gradient 19mmHg  2. CAD - noted during TAVR w/u 5/2020. S/p rotational atherectomy and ISABEL x 4 to prox-distal RCA. Complicated by hemorrhagic shock from LFA bleeding. 4 units of blood transfused.  3. Permanent AFib - rate control with digoxin and lopressor. Eliquis for CHADSVASc 6 (HTN, CVA, CAD, age)  4. Mild-moderate MS, severe TR - on echo 6/3/2021  5. H/o L PCA CVA - 2013  6. MEL - on CPAP  7. HFpEF  8. PVD - has R SFA not amenable to PCI  9. Dyslipidemia   10. MGUS - sees Dr. Hurley. CBC done 10/2021 wnl      Past Surgical History   I have reviewed  this patient's surgical history and updated it with pertinent information if needed.  Past Surgical History:   Procedure Laterality Date     BIOPSY  brain 2002     BONE MARROW BIOPSY, BONE SPECIMEN, NEEDLE/TROCAR N/A 6/8/2017    Procedure: BIOPSY BONE MARROW;  UNILATERAL BONE MARROW BIOPSY (CONSCIOUS SEDATION) ;  Surgeon: Jamie Gonzales MD;  Location:  GI     BONE MARROW BIOPSY, BONE SPECIMEN, NEEDLE/TROCAR N/A 2/23/2022    Procedure: BIOPSY, BONE MARROW;  Surgeon: Carmelita Aguilera MD;  Location:  GI     CARDIAC CATHERIZATION  09/05/2017    2V CAD, IFR of RCA 0.95     CV CORONARY ANGIOGRAM N/A 3/23/2020    Procedure: Coronary Angiogram and right heart cath;  Surgeon: Gino Ferrer MD;  Location:  HEART CARDIAC CATH LAB     CV HEART CATHETERIZATION WITH POSSIBLE INTERVENTION N/A 5/28/2020    Procedure: Heart Catheterization with Possible Intervention;  Surgeon: Larry Chawla MD;  Location:  HEART CARDIAC CATH LAB     CV HEART CATHETERIZATION WITH POSSIBLE INTERVENTION N/A 5/18/2020    Procedure: Heart Catheterization with Possible Intervention;  Surgeon: Gaurang Swenson MD;  Location:  HEART CARDIAC CATH LAB     CV INSTANTANEOUS WAVE-FREE RATIO N/A 3/23/2020    Procedure: Instantaneous Wave-Free Ratio;  Surgeon: Gino Ferrer MD;  Location:  HEART CARDIAC CATH LAB     CV PCI ATHERECTOMY ORBITAL N/A 5/18/2020    Procedure: Percutaneous Coronary Intervention Atherectomy Rotational;  Surgeon: Gaurang Swenson MD;  Location:  HEART CARDIAC CATH LAB     CV PCI STENT DRUG ELUTING N/A 5/18/2020    Procedure: Percutaneous Coronary Intervention Stent Drug Eluting;  Surgeon: Gaurang Swenson MD;  Location:  HEART CARDIAC CATH LAB     CV RIGHT HEART CATH MEASUREMENTS RECORDED N/A 5/28/2020    Procedure: Right Heart Cath;  Surgeon: Larry Chawla MD;  Location:  HEART CARDIAC CATH LAB     CV TEMPORARY PACEMAKER INSERTION N/A 5/18/2020    Procedure:  Temporary Pacemaker Insertion;  Surgeon: Gaurang Swenson MD;  Location:  HEART CARDIAC CATH LAB     ESOPHAGOSCOPY, GASTROSCOPY, DUODENOSCOPY (EGD), COMBINED N/A 2018    Procedure: COMBINED ESOPHAGOSCOPY, GASTROSCOPY, DUODENOSCOPY (EGD), BIOPSY SINGLE OR MULTIPLE;;  Surgeon: Xander Marie MD;  Location:  GI     HEAD & NECK SURGERY       IR GASTROSTOMY TUBE PERCUTANEOUS PLCMNT  2022     LAPAROSCOPIC ASSISTED INSERTION TUBE GASTROTOMY N/A 2022    Procedure: LAPAROSCOPIC ASSISTED GASTROSTOMY TUBE PLACEMENT;  Surgeon: Vicente Weber MD;  Location:  OR     ORTHOPEDIC SURGERY      right arm ulna reset after injury     THORACOSCOPIC WEDGE RESECTION LUNG Right 2016    Procedure: THORACOSCOPIC WEDGE RESECTION LUNG;  Surgeon: Abdelrahman Noriega MD;  Location:  OR     [facility] NONSPECIFIC PROCEDURE  as a child    T & A. abstracted 7/3/02.     [facility] NONSPECIFIC PROCEDURE  early    CTR. abstracted 7/3/02.       Prior to Admission Medications   Prior to Admission Medications   Prescriptions Last Dose Informant Patient Reported? Taking?   Lidocaine (LIDOCARE) 4 % Patch 2022 at 0800 Nursing Home Yes Yes   Sig: Place 1 patch onto the skin every 24 hours To prevent lidocaine toxicity, patient should be patch free for 12 hrs daily. Apply to abdomen   acetaminophen (TYLENOL) 500 MG tablet 2022 at 1400 Nursing Home Yes Yes   Si,000 mg by Per G Tube route 3 times daily   albuterol (PROVENTIL) (2.5 MG/3ML) 0.083% neb solution 2022 at AM Nursing Home Yes Yes   Sig: Take 2.5 mg by nebulization 3 times daily   apixaban ANTICOAGULANT (ELIQUIS) 5 MG tablet 2022 at AM Nursing Home No Yes   Sig: Take 1 tablet (5 mg) by mouth 2 times daily   Patient taking differently: 5 mg by Per G Tube route 2 times daily   atorvastatin (LIPITOR) 40 MG tablet 2022 at HS Nursing Home No Yes   Sig: Take 1 tablet (40 mg) by mouth daily   Patient taking differently: 40 mg by Per G Tube route At  Bedtime   calcium carbonate 600 mg-vitamin D 400 units (CALTRATE) 600-400 MG-UNIT per tablet 2022 at AM Nursing Home Yes Yes   Si tablet by Per G Tube route 2 times daily   clopidogrel (PLAVIX) 75 MG tablet 2022 at AM Nursing Home No Yes   Sig: Take 1 tablet (75 mg) by mouth daily   Patient taking differently: 75 mg by Per G Tube route daily   diclofenac (VOLTAREN) 1 % topical gel  at PRN Nursing Home No Yes   Sig: Apply 4 g topically 4 times daily as needed for moderate pain   digoxin (LANOXIN) 125 MCG tablet 2022 at AM Nursing Home No Yes   Sig: Take 1 tablet (125 mcg) by mouth daily   Patient taking differently: 125 mcg by Per G Tube route daily   ferrous sulfate 220 (44 Fe) MG/5ML ELIX 2022 at AM Nursing Home Yes Yes   Si mg by Per G Tube route daily   gabapentin (NEURONTIN) 300 MG capsule 2022 at  Nursing Home No Yes   Sig: Take 2 capsules (600 mg) by mouth At Bedtime   Patient taking differently: 600 mg by Per G Tube route At Bedtime   gabapentin (NEURONTIN) 300 MG capsule 2022 at 0800 Nursing Home Yes Yes   Si mg by Per G Tube route every morning At 0800   ipratropium - albuterol 0.5 mg/2.5 mg/3 mL (DUONEB) 0.5-2.5 (3) MG/3ML neb solution  at PRN Nursing Home Yes Yes   Sig: Take 1 vial by nebulization every 4 hours as needed for shortness of breath / dyspnea or wheezing   loperamide (IMODIUM) 1 MG/5ML liquid 2022 at 1754 Nursing Home Yes Yes   Si mg by Per G Tube route 4 times daily as needed for diarrhea   meclizine (ANTIVERT) 25 MG tablet 2022 at AM Nursing Home No Yes   Sig: Take 1 tablet (25 mg) by mouth 2 times daily   Patient taking differently: 25 mg by Per G Tube route 2 times daily   melatonin 3 MG tablet 2022 at  Nursing Home Yes Yes   Si mg by Per G Tube route At Bedtime   mirtazapine (REMERON) 15 MG tablet 2022 at  Nursing Home No Yes   Sig: Take 1 tablet (15 mg) by mouth At Bedtime   Patient taking differently: 15 mg by Per G  Tube route At Bedtime   multivitamin (OCUVITE) TABS 2022 at AM Nursing Home Yes Yes   Si tablet by Per G Tube route daily   oxyCODONE (ROXICODONE) 5 MG tablet  at PRN Nursing Home No Yes   Sig: Take 0.5 tablets (2.5 mg) by mouth every 6 hours as needed for severe pain   Patient taking differently: 2.5 mg by Per G Tube route every 6 hours as needed for severe pain   potassium chloride ER (K-TAB/KLOR-CON) 10 MEQ CR tablet 2022 at AM Nursing Home Yes Yes   Sig: Take 20 mEq by mouth daily Give via G-tube. Do not crush medication, make a slurry.   senna-docusate (SENOKOT-S/PERICOLACE) 8.6-50 MG tablet  at PRN Nursing Home Yes Yes   Si tablets by Per G Tube route 2 times daily as needed for constipation      Facility-Administered Medications: None     Allergies   Allergies   Allergen Reactions     Sulfa Drugs Difficulty breathing, Swelling and Hives     Adhesive Tape Blisters     Amiodarone Other (See Comments)     Developed pleural effusion     Latex Unknown     Cephalosporins      Tolerated ceftriaxone      Penicillins Hives     Reaction occurred as a child  Tolerated ceftriaxone in past   Other reaction(s): Hives       Social History   I have reviewed this patient's social history and updated it with pertinent information if needed. Efrem Ramos  reports that he quit smoking about 9 years ago. His smoking use included cigarettes. He has a 30.00 pack-year smoking history. He has never used smokeless tobacco. He reports previous alcohol use of about 42.0 standard drinks of alcohol per week. He reports that he does not use drugs.    Family History   I have reviewed this patient's family history and updated it with pertinent information if needed.   Family History   Problem Relation Age of Onset     Blood Disease Mother         Anemia     Cardiovascular Father      Cancer - colorectal Maternal Grandfather      Hypertension Brother      Diabetes Sister 5        Juvinile Diabetes passed at 36      Respiratory Other         Lung Cancer       Review of Systems   The 10 point Review of Systems is negative other than noted in the HPI or here.     Physical Exam   Temp: 99.1  F (37.3  C) Temp src: Oral BP: 95/59 Pulse: 117   Resp: 22 SpO2: 93 % O2 Device: None (Room air) Oxygen Delivery: 1 LPM  Vital Signs with Ranges  Temp:  [98.2  F (36.8  C)-99.1  F (37.3  C)] 99.1  F (37.3  C)  Pulse:  [] 117  Resp:  [15-23] 22  BP: ()/(39-76) 95/59  SpO2:  [89 %-99 %] 93 %  0 lbs 0 oz    Constitutional: Awake, alert, cooperative, no apparent distress.  Eyes: Conjunctiva and pupils examined and normal.  HEENT: Moist mucous membranes, normal dentition.  Respiratory: Clear to auscultation bilaterally, +crackles  Cardiovascular: Regular rate and rhythm, normal S1. +PRAFUL, no S2.  GI: Soft, non-distended, non-tender, normal bowel sounds.  Lymph/Hematologic: No anterior cervical or supraclavicular adenopathy.  Skin: No rashes, no cyanosis, no edema.  Musculoskeletal: No joint swelling, erythema or tenderness.  Neurologic: Cranial nerves 2-12 intact, normal strength and sensation.  Psychiatric: Alert, oriented to person, place and time, no obvious anxiety or depression.     Data   Results for orders placed or performed during the hospital encounter of 09/05/22 (from the past 24 hour(s))   Taylor Draw    Narrative    The following orders were created for panel order [facility] Draw.  Procedure                               Abnormality         Status                     ---------                               -----------         ------                     Extra Blue Top Tube[[id number]]                              Final result               Extra Green Top (Lithium...[[id number]]                      Final result               Extra Purple Top Tube[[id number]]                            Final result                 Please view results for these tests on the individual orders.   Blood Culture Peripheral Blood    Specimen:  Peripheral Blood   Result Value Ref Range    Culture No growth after 12 hours    Digoxin level   Result Value Ref Range    Digoxin 0.9   ug/L   Extra Blue Top Tube   Result Value Ref Range    Hold Specimen JIC    Extra Green Top (Lithium Heparin) Tube   Result Value Ref Range    Hold Specimen JIC    Extra Purple Top Tube   Result Value Ref Range    Hold Specimen JIC    CBC with platelets differential    Narrative    The following orders were created for panel order CBC with platelets differential.  Procedure                               Abnormality         Status                     ---------                               -----------         ------                     CBC with platelets and d...[[id number]]  Abnormal            Final result                 Please view results for these tests on the individual orders.   Comprehensive metabolic panel   Result Value Ref Range    Sodium 132 (L) 133 - 144 mmol/L    Potassium 4.5 3.4 - 5.3 mmol/L    Chloride 101 94 - 109 mmol/L    Carbon Dioxide (CO2) 26 20 - 32 mmol/L    Anion Gap 5 3 - 14 mmol/L    Urea Nitrogen 31 (H) 7 - 30 mg/dL    Creatinine 0.54 (L) 0.66 - 1.25 mg/dL    Calcium 8.4 (L) 8.5 - 10.1 mg/dL    Glucose 101 (H) 70 - 99 mg/dL    Alkaline Phosphatase 110 40 - 150 U/L    AST 34 0 - 45 U/L    ALT 16 0 - 70 U/L    Protein Total 8.4 6.8 - 8.8 g/dL    Albumin 2.0 (L) 3.4 - 5.0 g/dL    Bilirubin Total 0.5 0.2 - 1.3 mg/dL    GFR Estimate >90 >60 mL/min/1.73m2   Lipase   Result Value Ref Range    Lipase 47 (L) 73 - 393 U/L   Magnesium   Result Value Ref Range    Magnesium 2.2 1.6 - 2.3 mg/dL   TSH with free T4 reflex   Result Value Ref Range    TSH 3.74 0.40 - 4.00 mU/L   Phosphorus   Result Value Ref Range    Phosphorus 4.5 2.5 - 4.5 mg/dL   CBC with platelets and differential   Result Value Ref Range    WBC Count 18.3 (H) 4.0 - 11.0 10e3/uL    RBC Count 3.00 (L) 4.40 - 5.90 10e6/uL    Hemoglobin 9.3 (L) 13.3 - 17.7 g/dL    Hematocrit 29.6 (L) 40.0 - 53.0 %    MCV  99 78 - 100 fL    MCH 31.0 26.5 - 33.0 pg    MCHC 31.4 (L) 31.5 - 36.5 g/dL    RDW 16.3 (H) 10.0 - 15.0 %    Platelet Count 602 (H) 150 - 450 10e3/uL    % Neutrophils 81 %    % Lymphocytes 7 %    % Monocytes 9 %    % Eosinophils 1 %    % Basophils 1 %    % Immature Granulocytes 1 %    NRBCs per 100 WBC 0 <1 /100    Absolute Neutrophils 14.7 (H) 1.6 - 8.3 10e3/uL    Absolute Lymphocytes 1.3 0.8 - 5.3 10e3/uL    Absolute Monocytes 1.7 (H) 0.0 - 1.3 10e3/uL    Absolute Eosinophils 0.3 0.0 - 0.7 10e3/uL    Absolute Basophils 0.1 0.0 - 0.2 10e3/uL    Absolute Immature Granulocytes 0.2 <=0.4 10e3/uL    Absolute NRBCs 0.0 10e3/uL   Symptomatic; Yes; 9/3/2022 Influenza A/B & SARS-CoV2 (COVID-19) Virus PCR Multiplex Nasopharyngeal    Specimen: Nasopharyngeal; Swab   Result Value Ref Range    Influenza A PCR Negative Negative    Influenza B PCR Negative Negative    RSV PCR Negative Negative    SARS CoV2 PCR Negative Negative    Narrative    Testing was performed using the Xpert Xpress CoV2/Flu/RSV Assay on the [employer] GeneXpert Instrument. This test should be ordered for the detection of SARS-CoV-2 and influenza viruses in individuals who meet clinical and/or epidemiological criteria. Test performance is unknown in asymptomatic patients. This test is for in vitro diagnostic use under the FDA EUA for laboratories certified under CLIA to perform high or moderate complexity testing. This test has not been FDA cleared or approved. A negative result does not rule out the presence of PCR inhibitors in the specimen or target RNA in concentration below the limit of detection for the assay. If only one viral target is positive but coinfection with multiple targets is suspected, the sample should be re-tested with another FDA cleared, approved, or authorized test, if coinfection would change clinical management. This test was validated by the [facility]. These laboratories are certified under the  Clinical  Laboratory Improvement Amendments of 1988 (CLIA-88) as qualified to perform high complexity laboratory testing.   EKG 12-lead, tracing only   Result Value Ref Range    Systolic Blood Pressure  mmHg    Diastolic Blood Pressure  mmHg    Ventricular Rate 119 BPM    Atrial Rate 125 BPM    NJ Interval  ms    QRS Duration 96 ms     ms    QTc 464 ms    P Axis  degrees    R AXIS -64 degrees    T Axis 55 degrees    Interpretation ECG       Atrial fibrillation with rapid ventricular response  Incomplete right bundle branch block  Left anterior fascicular block  Abnormal ECG  When compared with ECG of 21-JUL-2022 17:12,  Vent. rate has increased BY  39 BPM  Confirmed by GENERATED REPORT, COMPUTER ([id number]),  Ani Napier ([id number]) on 9/6/2022 12:02:20 AM     iStat Gases (lactate) venous, POCT   Result Value Ref Range    Lactic Acid POCT 2.5 (H) <=2.0 mmol/L    Bicarbonate Venous POCT 25 21 - 28 mmol/L    O2 Sat, Venous POCT 70 (L) 94 - 100 %    pCO2V Venous POCT 36 (L) 40 - 50 mm Hg    pH Venous POCT 7.45 (H) 7.32 - 7.43    pO2 Venous POCT 35 25 - 47 mm Hg   XR Chest 2 Views    Narrative    EXAM: XR CHEST 2 VIEWS  LOCATION: [facility]  DATE/TIME: 9/5/2022 9:16 PM    INDICATION: Cough; hypoxemia; aspiration pneumonia  COMPARISON: 07/21/2022.      Impression    IMPRESSION: Persistent infiltrates throughout the right long, which appear slightly increased, as compared to the previous chest CT. Findings are superimposed on a background of pulmonary fibrosis.     Blunting of the right costophrenic angle appears chronic. No appreciably acute pleural effusion. No pneumothorax.    Unchanged mild cardiomegaly. Atherosclerotic calcifications involve the thoracic aorta, as well as some of the peripheral arterial vasculature. Dense calcifications of the mitral annulus.    Vertebroplasty changes near the thoracolumbar junction.     Lactic acid whole blood   Result Value Ref Range    Lactic  Acid 1.3 0.7 - 2.0 mmol/L   Procalcitonin   Result Value Ref Range    Procalcitonin <0.05 <0.05 ng/mL   Nt probnp inpatient   Result Value Ref Range    N terminal Pro BNP Inpatient 4,330 (H) 0 - 1,800 pg/mL   Magnesium   Result Value Ref Range    Magnesium 2.0 1.6 - 2.3 mg/dL   Clostridium difficile toxin B PCR    Specimen: Per Rectum; Stool   Result Value Ref Range    C Difficile Toxin B by PCR Negative Negative    Narrative    The [employer] Xpert C. difficile Assay, performed on the [employer]  Instrument Systems, is a qualitative in vitro diagnostic test for rapid detection of toxin B gene sequences from unformed (liquid or soft) stool specimens collected from patients suspected of having Clostridioides difficile infection (CDI). The test utilizes automated real-time polymerase chain reaction (PCR) to detect toxin gene sequences associated with toxin producing C. difficile. The Xpert C. difficile Assay is intended as an aid in the diagnosis of CDI.   CRP inflammation   Result Value Ref Range    CRP Inflammation 88.1 (H) 0.0 - 8.0 mg/L   Erythrocyte sedimentation rate auto   Result Value Ref Range    Erythrocyte Sedimentation Rate 87 (H) 0 - 20 mm/hr             Clinically Significant Risk Factors Present on Admission        # Hyponatremia: Na = 132 mmol/L (Ref range: 133 - 144 mmol/L) on admission, will monitor as appropriate     # Hypoalbuminemia: Albumin = 2.0 g/dL (Ref range: 3.4 - 5.0 g/dL) on admission, will monitor as appropriate   # Coagulation Defect: home medication list includes an anticoagulant medication  # Platelet Defect: home medication list includes an antiplatelet medication  # Severe Malnutrition: based on nutrition assessment      Cardiovascular: Cardiac Arrhythmia: Atrial fibrillation: Permanent    Fluid & Electrolyte Disorders: Fluid overload, unspecified    Neurology: Dementia: Unspecified dementia without behavioral disturbance    Pulmonology: Pneumonia    Systemic: Chronic Fatigue and  Other Debilities: Chronic fatigue, unspecified          4

## 2022-09-06 NOTE — PROGRESS NOTES
09/06/22 1400   Quick Adds   Type of Visit Initial PT Evaluation       Present no   Living Environment   People in Home child(олег), adult   Current Living Arrangements house   Home Accessibility stairs to enter home;stairs within home   Number of Stairs, Main Entrance 3   Stair Railings, Main Entrance railing on right side (ascending)   Number of Stairs, Within Home, Primary greater than 10 stairs  (12)   Stair Railings, Within Home, Primary railing on right side (ascending)   Transportation Anticipated family or friend will provide   Living Environment Comments Patient admitted from [facility].  Patient lives with his adult son in a multi-level home.  Patient reports his bedroom and bathroom are on the second floor of home.  Patient has a walk-in shower and sleeps in a standard bed.   Self-Care   Usual Activity Tolerance moderate   Current Activity Tolerance fair   Regular Exercise Yes   Activity/Exercise Type other (see comments)  (Reports he was doing supine LE exercises at [facility])   Equipment Currently Used at Home walker, rolling;wheelchair, manual  (FWW and 4WW)   Fall history within last six months yes   Number of times patient has fallen within last six months 4   Activity/Exercise/Self-Care Comment Patient reports independence with dressing, bathing, and toileting tasks. Patient reports he owns a rolling walker but was mostly furniture/wall walking in the home due to clutter in the halls.  Patient had meals delivered to the home.   General Information   Onset of Illness/Injury or Date of Surgery 09/05/22   Referring Physician Franks, Jonathan Arango MD   Patient/Family Therapy Goals Statement (PT) Patient would like to figure out how to sleep with head elevated 30 degrees.   Pertinent History of Current Problem (include personal factors and/or comorbidities that impact the POC) 77 year old male admitted on 9/5/2022. He presents to the emergency department with ongoing diarrhea and  worsening cough and is found to have atrial fibrillation with rapid ventricular rate, possible aspiration pneumonia with right-sided infiltrate in the setting of chronic dysphagia with recent placement of feeding tube.   Existing Precautions/Restrictions fall;NPO   Cognition   Affect/Mental Status (Cognition) WFL   Orientation Status (Cognition) oriented x 4   Follows Commands (Cognition) WFL   Integumentary/Edema   Integumentary/Edema Comments Age-related skin changes, stage I pressure injury to sacrum   Posture    Posture Forward head position;Protracted shoulders;Kyphosis   Range of Motion (ROM)   Range of Motion ROM is WFL   Strength (Manual Muscle Testing)   Strength (Manual Muscle Testing) Able to perform R SLR;Able to perform L SLR;Deficits observed during functional mobility   Strength Comments Generalized LE weakness   Bed Mobility   Comment, (Bed Mobility) Patient performs supine <> sit transfer with HOB elevated 30 degrees and upper extremity support on bed rail.  Patient requires increased time to complete bed mobility.   Transfers   Comment, (Transfers) Patient performs sit <> stand transfer from bed with minAx1, bilateral upper extremities pushing from bed rails.  Patient demonstrates appropriate eccentric control during stand > sit when he reaches back for sitting surface.   Gait/Stairs (Locomotion)   Comment, (Gait/Stairs) Patient able to take several lateral and retro steps at EOB with FWW and CGA.  Patient demonstrates poor upright posture with downward gaze and forward flexed head and trunk.  Patient fatigues rapidly with ambulation, requires seated rest on bed due to fatigue.   Balance   Balance Comments Impaired dynamic balance, significant falls history   Sensory Examination   Sensory Perception patient reports no sensory changes   Clinical Impression   Criteria for Skilled Therapeutic Intervention Yes, treatment indicated   PT Diagnosis (PT) Impaired functional mobility   Influenced by the  following impairments Generalized weakness and deconditioning, impaired balance, decreased activity tolerance   Functional limitations due to impairments Impaired independence with bed mobility, transfers, gait, and stair negotiation   Clinical Presentation (PT Evaluation Complexity) Evolving/Changing   Clinical Presentation Rationale Clinical judgement,  PMH, social support   Clinical Decision Making (Complexity) moderate complexity   Planned Therapy Interventions (PT) balance training;bed mobility training;gait training;home exercise program;neuromuscular re-education;patient/family education;postural re-education;strengthening;transfer training;progressive activity/exercise   Anticipated Equipment Needs at Discharge (PT) walker, rolling;wheelchair   Risk & Benefits of therapy have been explained evaluation/treatment results reviewed;care plan/treatment goals reviewed;risks/benefits reviewed;participants voiced agreement with care plan;participants included;patient   PT Discharge Planning   PT Discharge Recommendation (DC Rec) Transitional Care Facility;home with assist;home with home care physical therapy   PT Rationale for DC Rec Patient presents significantly below his prior level of function, currently SBA for bed mobility with HOB elevated and upper extremity support on bed rail and minAx1 for transfers.  Gait distance limited to several lateral and retro steps with FWW and CGA, fatigues rapidly with OOB activity.  Patient was admitted from TCU, lives in multi-level home with his son at baseline.  Recommend patient return to TCU at discharge to progress safety and independence with ADLs and mobility prior to returning home.  If patient declines TCU, anticipate he will require Ax1 for transfers and short distance ambulation and Ax2 for stair negotiation with single rail.  Patient owns a FWW, 4WW, and manual w/c.  Patient reports he lives with his adult son but unclear about level of assistance son can provide.   Patient would require  PT if discharging home as he presents at significant risk of falls given decreased strength, impaired dynamic balance, and poor endurance.  Patient will require a hospital bed/adjustable bed to maintain 30 degrees of elevation for tube feed or he will be at significant risk of aspiration.   Total Evaluation Time   Total Evaluation Time (Minutes) 10   Physical Therapy Goals   PT Frequency 5x/week   PT Predicted Duration/Target Date for Goal Attainment 09/13/22   PT Goals Bed Mobility;Transfers;Gait;Stairs   PT: Bed Mobility Modified independent;Supine to/from sit;Rolling   PT: Transfers Supervision/stand-by assist;Sit to/from stand;Bed to/from chair;Assistive device   PT: Gait Supervision/stand-by assist;100 feet;Rolling walker   PT: Stairs Minimal assist;3 stairs;Rail on right

## 2022-09-06 NOTE — ED TRIAGE NOTES
Pt arrives from [facility] with his son who reports concerns regarding pt's feedings per tube and continuous diarrhea along with concern for pneumonia with need O2 at the nursing home for the past few days. Son is concerned for refeeding syndrome as well.      Triage Assessment     Row Name 09/05/22 2014       Triage Assessment (Adult)    Airway WDL WDL       Respiratory WDL    Respiratory WDL X  shortness of breath       Skin Circulation/Temperature WDL    Skin Circulation/Temperature WDL WDL       Cardiac WDL    Cardiac WDL WDL       Peripheral/Neurovascular WDL    Peripheral Neurovascular WDL WDL       Cognitive/Neuro/Behavioral WDL    Cognitive/Neuro/Behavioral WDL WDL

## 2022-09-06 NOTE — H&P
[facility]    History and Physical - Hospitalist Service       Date of Admission:  9/5/2022    Assessment & Plan      Efrem Ramos is a 77 year old male admitted on 9/5/2022. He presents to the emergency department with ongoing diarrhea and worsening cough and is found to have atrial fibrillation with rapid ventricular rate, possible aspiration pneumonia with right-sided infiltrate in the setting of chronic dysphagia with recent placement of feeding tube.    Dyspnea: Likely multifactorial with chronic aspiration, pulmonary fibrosis associated with chronic aspiration and prior amiodarone toxicity, and acute on chronic valvular heart failure with severe tricuspid regurgitation and severe aortic stenosis likely exacerbated by atrial fibrillation with rapid ventricular rate.  -Cardiology consulted given my concern that there is a significant component of valvular heart failure and intolerance of A. fib RVR resulting in patient's dyspnea.  Therapeutic on digoxin, previously failed amiodarone in the setting of pulmonary complications.  Suspect that metoprolol use may be limited with chronic hypotension as well (Baseline systolic BPs appear to be in the 80s-100 range)  -Add on BNP   -As below, holding anti-infectives with undetectable procalcitonin  -Sputum culture, gram stain if able  -Cardiac telemetry  -Continue prior to admission digoxin  -As needed low-dose IV metoprolol for A. fib RVR.  Hold for systolic blood pressure less than 90  -Received 500 mL saline bolus in the emergency department.  Would be cautious with additional fluid given concern for valvular heart failure, though generally patient appears mildly hypovolemic to euvolemic.    Chronic aspiration: Had a percutaneous G-tube placed via surgical team at the end of July for chronic aspiration and failure to thrive; IR was not an option as colon was overlying stomach.  Failure to thrive in an adult with severe protein calorie  malnutrition:  -Nutrition consulted for resumption of tube feedings; may need to consider change in formulation given ongoing diarrhea despite as needed Imodium at care facility.  Was on Jevity 1.5 in July, uncertain what tube feeding formulation he was switched to at care facility.  -N.p.o.; meds and nutrition through feeding tube.  Tube feeding flushes per protocol  -Physical therapy consulted  -Could consider IR consultation to exchange G-tube for GJ tube, though jejunal feedings are more commonly associated with dumping syndrome, and patient already with persistent diarrhea after initiation on tube feeds.    Aspiration pneumonia, possible: Possible increased infiltrates on the right on x-ray, though does not clearly have hypoxia with oxygen saturations as high as 99% while at rest on room air in the emergency department.  Occasionally with poor waveform resulting in low reading, though patient does become quite dyspneic with minimal activity and increased heart rate.  -Received cefepime and azithromycin in the emergency department.  Procalcitonin added on and undetectable.  Discontinuing additional anti-infectives  -Add on Legionella urine antigen with mild hyponatremia and ongoing diarrhea.  Generally lower suspicion of this, though CT from 7/21/2022 was suggestive of atypical multifocal pneumonia. Treatment at last admission was with ceftriaxone and doxycycline, later clindamycin completed 8/12/2022.  He did receive azithromycin at last ER encounter as well as tonight in the emergency department, though I do not believe he ever completed a course.    Persistent diarrhea: Patient reports this has been an ongoing issue since G-tube placement.  He expresses an interest in colostomy so that he might avoid ongoing episodes of diarrhea and need for pericares.  Discussed with patient that a colostomy in this instance would be for quality of life, and an elective procedure.  With his valvular heart failure and tenuous  respiratory status with chronic aspiration, would be a poor surgical candidate for this, and focus should be on attempting to adjust to pain formula to treat likely diarrhea associated with tube feeds versus determine underlying etiology of diarrhea and provide treatment.  Note that in 2005, patient had a cecal biopsy with pathology consistent with collagenous colitis.  -Nutrition consulted for resumption of tube feeds; consider addition of fiber to feed regimen given persistent diarrhea  -Intake and output, daily weights; will need to match output  -C. difficile, enteric panel added on  -Potassium, magnesium replacement protocols in place  -Loperamide increased to 4 mg.  -Initiating budesonide 9 mg daily in a.m. if enteric panels return negative  -[facility] consulted given possibility of collagenous colitis; timing of persistent diarrhea coinciding with initiation of tube feeds suggests against this    Stage I sacral pressure ulceration: Present on admission.  Nonblanching erythema present.  No open sores appreciated.  -Wound nurse consulted  -Frequent repositioning     Leukocytosis: White count of 18.3 with left shift, platelets elevated in the 600 range suggestive of inflammatory issue.  Noted negative procalcitonin, however.  Note that patient has had a persistent leukocytosis since the end of July  -C. difficile and enteric panel pending  -Add on CRP, ESR.  Note that historically has had elevated CRP which did improve with anti-infective treatment.  Negative procalcitonin at that time as well.    Coronary artery disease: History of extensive proximal to distal RCA stenting.  Patent on last angiogram in May 2020.  History of CVA  -Continue prior to admission apixaban anticoagulation  -Continue prior to admission Plavix  -Continue prior to admission atorvastatin     MGUS: February 2022 bone marrow biopsy with hypercellular marrow including 7% plasma cells. Followed w/ [id number] through oncology service.  -continue  with outpatient follow up        Diet:   N.p.o.; nutrition consulted for resumption of tube feeds  DVT Prophylaxis: DOAC  Bravo Catheter: Not present  Central Lines: None  Cardiac Monitoring: None  Code Status:   Full code.  Discussed and confirmed with patient on admission.    Clinically Significant Risk Factors Present on Admission         # Hyponatremia: Na = 132 mmol/L (Ref range: 133 - 144 mmol/L) on admission, will monitor as appropriate     # Hypoalbuminemia: Albumin = 2.0 g/dL (Ref range: 3.4 - 5.0 g/dL) on admission, will monitor as appropriate   # Coagulation Defect: home medication list includes an anticoagulant medication  # Platelet Defect: home medication list includes an antiplatelet medication           Disposition Plan    anticipate 9/8/2022 back to TCU     The patient's care was discussed with the Patient and Dr Hernandez in the emergency department.    Jonathan Franks MD  Hospitalist Service  [facility]  Securely message with the Vocera Web Console (learn more here)  Text page via [employer] Paging/Directory         ______________________________________________________________________    Chief Complaint   Cough, ongoing diarrhea.  Patient tells me he is here in the hospital today because he does not feel he is receiving enough care at his TCU.    History is obtained from patient, chart review, discussion with ER provider    History of Present Illness   Efrem Ramos is a 77 year old male who presents to the emergency department from [facility] with complaints of cough worsened today as well as ongoing diarrhea.    Patient with a complex past medical history including prior CVA, coronary artery disease with extensive RCA stenting, severe low-flow aortic stenosis as well as severe tricuspid regurgitation, atrial fibrillation and atrial flutter on digoxin therapy after amiodarone lung toxicity, monoclonal gammopathy of uncertain significance with ongoing progression being  monitored by oncology, failure to thrive with chronic aspiration and recent G-tube placement at the end of July.    Patient had been experiencing dysphagia and weight loss since at least the beginning of 2022.  It was in the setting that patient was referred for gastrostomy tube placement.  Not able to be performed through interventional radiology given redundant colon overlying stomach, and this procedure was completed by Dr. Weber of surgery during his last admission in July when he presented with lightheadedness and hypotension.    During that admission, patient was seen by cardiology for his hypotension and valvular heart failure.  Noted to be a difficult situation given his frailty and vascular calcifications; plan was for follow-up as an outpatient after he recovered from a suspected pneumonia.  He was also seen by pulmonary during his hospitalization and thought to have chronic aspiration with scarring, treated for aspiration pneumonia.  It was noted that though there were intermittent concerns for hypoxia, he would saturate in the upper 90% range on room air without difficulty, making primary underlying lung issue less likely for respiratory symptoms.    Patient was discharged to TCU on tube feeds with hope that he could improve his functional status prior to outpatient cardiology evaluation in structural heart clinic.    Since discharge to TCU, patient continues to have diarrhea.  He tells me that he did have a change in his tube feeds, though cannot tell me what he receives for tube feeds.  He continues to have multiple episodes of diarrhea including episodes of nocturnal diarrhea.  He tells me he is interested in a colostomy to assist with his quality of life/cleaning after his loose stools.  I discussed with him that he is a high risk procedural candidate for TAVR, and an elective colostomy, though it might improve his quality of life, would be ill advised until valvular disease could be  addressed.    No fevers, though patient has had a cough.  He tells me that his cough is new and worsened today, though he also describes complaining of a cough to his care team at TCU and believes that he had an x-ray done 1 week ago.  Patient is not hypoxic.  When I see him in the emergency department off of oxygen he is saturating 99% on room air.  Intermittently, patient with poor waveform on plethysmography, and O2 saturation will alarm as low.  Though I do not believe he is hypoxic at rest, with minimal activity, such as rolling over, he has increased rate with his A. fib RVR, and increased work of breathing.  He may have true hypoxia with his A. fib RVR up into the 130 range.    No fevers.    Patient believes that when he receives his continuous tube feedings, he is not always with his head of bed elevated.  Known chronic aspiration, and G-tube does not eliminate aspiration risk.    Patient with a leukocytosis in the 18,000 range in the emergency department.  Chest x-ray with possible increase in right lower lobe infiltrate with emphysema and pulmonary fibrosis.  Patient has had a leukocytosis since at least July 2022.  Elevated inflammatory markers historically as well.    Lengthy discussion with patient regarding goals of care at time of admission.  His goal is to become well enough to be considered for cardiac procedure including TAVR.  For now, he is not interested in hospice or palliative care, continues to desire full resuscitative measures.    Review of Systems    The 10 point Review of Systems is negative other than noted in the HPI or here.  No fevers  Cough without sputum production  Diarrhea occurring up to 10 times per day with nocturnal diarrhea present as well.  Patient is uncertain if he has gained or lost weight since TCU discharge    Past Medical History    I have reviewed this patient's medical history and updated it with pertinent information if needed.   Past Medical History:   Diagnosis  Date     Atrial fibrillation (H)     amiodarone therapy discontinued due to pulmonary toxicity     Atrial flutter (H)      Benign essential hypertension 11/20/2018     Cancer (H) vocal cord     Carpal tunnel syndrome     abstracted 7/3/02.     Coronary artery disease involving native coronary artery of native heart without angina pectoris 05/08/2020    Cath 5/28/2020: patent stents; Cath 5/18/2020: ISABEL x4 to RCA; Cath 3/2020: 1 vessel disease; Cath 2017: moderate 2 vessel disease     CVA (cerebral infarction) 05/05/2015     Demyelinating disease of central nervous system, unspecified (H)     abstracted 7/3/02. 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     Pneumonia 08/17/2016     PVD (peripheral vascular disease) (H)      Redundant colon     needs CT colonography     Shingles      SKIN DISORDERS NEC 03/15/2005     Sleep apnea      Sleep apnea      SVT (supraventricular tachycardia) (H)        Past Surgical History   I have reviewed this patient's surgical history and updated it with pertinent information if needed.  Past Surgical History:   Procedure Laterality Date     BIOPSY  brain 2002     BONE MARROW BIOPSY, BONE SPECIMEN, NEEDLE/TROCAR N/A 6/8/2017    Procedure: BIOPSY BONE MARROW;  UNILATERAL BONE MARROW BIOPSY (CONSCIOUS SEDATION) ;  Surgeon: Jamie Gonzales,  MD;  Location:  GI     BONE MARROW BIOPSY, BONE SPECIMEN, NEEDLE/TROCAR N/A 2/23/2022    Procedure: BIOPSY, BONE MARROW;  Surgeon: Carmelita Aguilera MD;  Location:  GI     CARDIAC CATHERIZATION  09/05/2017    2V CAD, IFR of RCA 0.95     CV CORONARY ANGIOGRAM N/A 3/23/2020    Procedure: Coronary Angiogram and right heart cath;  Surgeon: Gino Ferrer MD;  Location:  HEART CARDIAC CATH LAB     CV HEART CATHETERIZATION WITH POSSIBLE INTERVENTION N/A 5/28/2020    Procedure: Heart Catheterization with Possible Intervention;  Surgeon: Larry Chawla MD;  Location:  HEART CARDIAC CATH LAB     CV HEART CATHETERIZATION WITH POSSIBLE INTERVENTION N/A 5/18/2020    Procedure: Heart Catheterization with Possible Intervention;  Surgeon: Gaurang Swenson MD;  Location:  HEART CARDIAC CATH LAB     CV INSTANTANEOUS WAVE-FREE RATIO N/A 3/23/2020    Procedure: Instantaneous Wave-Free Ratio;  Surgeon: Gino Ferrer MD;  Location:  HEART CARDIAC CATH LAB     CV PCI ATHERECTOMY ORBITAL N/A 5/18/2020    Procedure: Percutaneous Coronary Intervention Atherectomy Rotational;  Surgeon: Gaurang Swenson MD;  Location:  HEART CARDIAC CATH LAB     CV PCI STENT DRUG ELUTING N/A 5/18/2020    Procedure: Percutaneous Coronary Intervention Stent Drug Eluting;  Surgeon: Gaurang Swenson MD;  Location:  HEART CARDIAC CATH LAB     CV RIGHT HEART CATH MEASUREMENTS RECORDED N/A 5/28/2020    Procedure: Right Heart Cath;  Surgeon: Larry Chawla MD;  Location:  HEART CARDIAC CATH LAB     CV TEMPORARY PACEMAKER INSERTION N/A 5/18/2020    Procedure: Temporary Pacemaker Insertion;  Surgeon: Gaurang Swenson MD;  Location:  HEART CARDIAC CATH LAB     ESOPHAGOSCOPY, GASTROSCOPY, DUODENOSCOPY (EGD), COMBINED N/A 9/8/2018    Procedure: COMBINED ESOPHAGOSCOPY, GASTROSCOPY, DUODENOSCOPY (EGD), BIOPSY SINGLE OR MULTIPLE;;  Surgeon: Xander Marie MD;  Location:  GI     HEAD &  NECK SURGERY       IR GASTROSTOMY TUBE PERCUTANEOUS PLCMNT  2022     LAPAROSCOPIC ASSISTED INSERTION TUBE GASTROTOMY N/A 2022    Procedure: LAPAROSCOPIC ASSISTED GASTROSTOMY TUBE PLACEMENT;  Surgeon: Vicente Weber MD;  Location:  OR     ORTHOPEDIC SURGERY      right arm ulna reset after injury     THORACOSCOPIC WEDGE RESECTION LUNG Right 2016    Procedure: THORACOSCOPIC WEDGE RESECTION LUNG;  Surgeon: Abdelrahman Noriega MD;  Location:  OR     [facility] NONSPECIFIC PROCEDURE  as a child    T & A. abstracted 7/3/02.     ZZC NONSPECIFIC PROCEDURE  early    CTR. abstracted 7/3/02.       Social History   I have reviewed this patient's social history and updated it with pertinent information if needed.  Social History     Tobacco Use     Smoking status: Former Smoker     Packs/day: 1.00     Years: 30.00     Pack years: 30.00     Types: Cigarettes     Quit date: 2013     Years since quittin.1     Smokeless tobacco: Never Used   Substance Use Topics     Alcohol use: Not Currently     Alcohol/week: 42.0 standard drinks     Types: 42 Standard drinks or equivalent per week     Drug use: No       Family History   I have reviewed this patient's family history and updated it with pertinent information if needed.  Family History   Problem Relation Age of Onset     Blood Disease Mother         Anemia     Cardiovascular Father      Cancer - colorectal Maternal Grandfather      Hypertension Brother      Diabetes Sister 5        Juvinile Diabetes passed at 36     Respiratory Other         Lung Cancer       Prior to Admission Medications   Prior to Admission Medications   Prescriptions Last Dose Informant Patient Reported? Taking?   Lidocaine (LIDOCARE) 4 % Patch 2022 at 0800 Nursing Home Yes Yes   Sig: Place 1 patch onto the skin every 24 hours To prevent lidocaine toxicity, patient should be patch free for 12 hrs daily. Apply to abdomen   acetaminophen (TYLENOL) 500 MG tablet 2022 at 1400 Nursing  Home Yes Yes   Si,000 mg by Per G Tube route 3 times daily   albuterol (PROVENTIL) (2.5 MG/3ML) 0.083% neb solution 2022 at AM Nursing Home Yes Yes   Sig: Take 2.5 mg by nebulization 3 times daily   apixaban ANTICOAGULANT (ELIQUIS) 5 MG tablet 2022 at AM Nursing Home No Yes   Sig: Take 1 tablet (5 mg) by mouth 2 times daily   Patient taking differently: 5 mg by Per G Tube route 2 times daily   atorvastatin (LIPITOR) 40 MG tablet 2022 at  Nursing Home No Yes   Sig: Take 1 tablet (40 mg) by mouth daily   Patient taking differently: 40 mg by Per G Tube route At Bedtime   calcium carbonate 600 mg-vitamin D 400 units (CALTRATE) 600-400 MG-UNIT per tablet 2022 at AM Nursing Home Yes Yes   Si tablet by Per G Tube route 2 times daily   clopidogrel (PLAVIX) 75 MG tablet 2022 at AM Nursing Home No Yes   Sig: Take 1 tablet (75 mg) by mouth daily   Patient taking differently: 75 mg by Per G Tube route daily   diclofenac (VOLTAREN) 1 % topical gel  at [facility] Nursing Home No Yes   Sig: Apply 4 g topically 4 times daily as needed for moderate pain   digoxin (LANOXIN) 125 MCG tablet 2022 at AM Nursing Home No Yes   Sig: Take 1 tablet (125 mcg) by mouth daily   Patient taking differently: 125 mcg by Per G Tube route daily   ferrous sulfate 220 (44 Fe) MG/5ML ELIX 2022 at AM Nursing Home Yes Yes   Si mg by Per G Tube route daily   gabapentin (NEURONTIN) 300 MG capsule 2022 at  Nursing Home No Yes   Sig: Take 2 capsules (600 mg) by mouth At Bedtime   Patient taking differently: 600 mg by Per G Tube route At Bedtime   gabapentin (NEURONTIN) 300 MG capsule 2022 at 0800 Nursing Home Yes Yes   Si mg by Per G Tube route every morning At 0800   ipratropium - albuterol 0.5 mg/2.5 mg/3 mL (DUONEB) 0.5-2.5 (3) MG/3ML neb solution  at [facility] Nursing Home Yes Yes   Sig: Take 1 vial by nebulization every 4 hours as needed for shortness of breath / dyspnea or wheezing   loperamide (IMODIUM) 1  MG/5ML liquid 2022 at [facility] [place] Yes Yes   Si mg by Per G Tube route 4 times daily as needed for diarrhea   meclizine (ANTIVERT) 25 MG tablet 2022 at AM Nursing Home No Yes   Sig: Take 1 tablet (25 mg) by mouth 2 times daily   Patient taking differently: 25 mg by Per G Tube route 2 times daily   melatonin 3 MG tablet 2022 at [facility] Nursing Home Yes Yes   Si mg by Per G Tube route At Bedtime   mirtazapine (REMERON) 15 MG tablet 2022 at  Nursing Home No Yes   Sig: Take 1 tablet (15 mg) by mouth At Bedtime   Patient taking differently: 15 mg by Per G Tube route At Bedtime   multivitamin (OCUVITE) TABS 2022 at AM Nursing Home Yes Yes   Si tablet by Per G Tube route daily   oxyCODONE (ROXICODONE) 5 MG tablet  at [facility] Nursing Home No Yes   Sig: Take 0.5 tablets (2.5 mg) by mouth every 6 hours as needed for severe pain   Patient taking differently: 2.5 mg by Per G Tube route every 6 hours as needed for severe pain   potassium chloride ER (K-TAB/KLOR-CON) 10 MEQ CR tablet 2022 at AM Nursing Home Yes Yes   Sig: Take 20 mEq by mouth daily Give via G-tube. Do not crush medication, make a slurry.   senna-docusate (SENOKOT-S/PERICOLACE) 8.6-50 MG tablet  at [facility] Nursing Home Yes Yes   Si tablets by Per G Tube route 2 times daily as needed for constipation      Facility-Administered Medications: None     Allergies   Allergies   Allergen Reactions     Sulfa Drugs Difficulty breathing, Swelling and Hives     Adhesive Tape Blisters     Amiodarone Other (See Comments)     Developed pleural effusion     Latex Unknown     Cephalosporins      Tolerated ceftriaxone      Penicillins Hives     Reaction occurred as a child  Tolerated ceftriaxone in past   Other reaction(s): Hives       Physical Exam   Vital Signs: Temp: 98.2  F (36.8  C) Temp src: Temporal BP: (!) 86/58 Pulse: 114   Resp: 20 SpO2: 98 % O2 Device: Nasal cannula Oxygen Delivery: 2 LPM    General Appearance: Frail appearing  77-year-old male resting comfortably in bed.  Eyes: No scleral icterus or injection  HEENT: Wasting of muscles of mastication.  Atraumatic  Respiratory: Patient with some rhonchi in mid right lung field on inspiration, otherwise noted to have inspiratory crackles with a basilar predominance, extending to mid lung fields bilaterally and more consistent with pulmonary edema.  Cardiovascular: Heart sounds are somewhat distant.  Irregular rate and rhythm with heart rate varying from the 100-130 range.  Extremities are cool  GI: Some epigastric tenderness to palpation, otherwise no abdominal tenderness.  No palpable mass.  G-tube in place  Lymph/Hematologic: No lower extremity edema  Skin: Nonblanching erythema surrounding sacrum.  No jaundice  Musculoskeletal: Diffuse muscular wasting of extremities, chest wall.  Subcutaneous fat wasting noted as well.  Neurologic: Patient is alert, conversant, appropriate in conversation.  Psychiatric: Pleasant, normal affect    Data   Data reviewed today: I reviewed all medications, new labs and imaging results over the last 24 hours. I personally reviewed the chest x-ray image(s) showing Appears to have right-sided base and midlung field infiltrate, similar distribution to prior x-ray and CT imaging.    Recent Labs   Lab 09/05/22 2038 08/31/22  0710   WBC 18.3* 16.1*   HGB 9.3* 8.6*   MCV 99 100   * 464*   * 133   POTASSIUM 4.5 4.1   CHLORIDE 101 100   CO2 26 26   BUN 31* 25   CR 0.54* 0.50*   ANIONGAP 5 7   ULISSES 8.4* 8.2*   * 122*   ALBUMIN 2.0*  --    PROTTOTAL 8.4  --    BILITOTAL 0.5  --    ALKPHOS 110  --    ALT 16  --    AST 34  --    LIPASE 47*  --

## 2022-09-06 NOTE — ED NOTES
Ed   [facility]  ED Nurse Handoff Report    ED Chief complaint: Generalized Weakness and Shortness of Breath      ED Diagnosis: General weakness  Final diagnoses:   None       Code Status: discuss with admit md  Allergies:   Allergies   Allergen Reactions     Sulfa Drugs Difficulty breathing, Swelling and Hives     Adhesive Tape Blisters     Amiodarone Other (See Comments)     Developed pleural effusion     Latex Unknown     Cephalosporins      Tolerated ceftriaxone      Penicillins Hives     Reaction occurred as a child  Tolerated ceftriaxone in past   Other reaction(s): Hives       Patient Story: Son brought pt here from nursing home. Wants higher level of care for dad Worried about aspiration PNA  And now requires 2 L 02.   Focused Assessment: gcs 15 no headache or dizziness. No neuro deficts.  Lungs:creamy sputum, cough, sob at rest. No wheezes. Decreased at bases.  CVS: s1 s2 a=r no edema, pale, cachetic.  Abdo, Has feeding tube G tube, flushes well, takes all pills crushed in same. New diahrea  Skin dry pale      Treatments and/or interventions provided: iv fluids  Patient's response to treatments and/or interventions: improved hydration    To be done/followed up on inpatient unit: monitor 02 sats    Does this patient have any cognitive concerns?: Has goldberg disease    Activity level - Baseline/Home:  Stand with assist x2  Activity Level - Current:   Stand with Assist and Stand with assist x2    Patient's Preferred language: English   Needed?: No    Isolation: None, Contact  and Droplet  Infection: C-Diff (Clostridium Difficile) diagnosis  Patient tested for COVID 19 prior to admission: YES  Bariatric?: No    Vital Signs:   Vitals:    09/05/22 2008 09/05/22 2009   BP:  96/58   Pulse: 118    Resp: 20    Temp: 98.4  F (36.9  C)    TempSrc: Temporal    SpO2: 97%        Cardiac Rhythm:     Was the PSS-3 completed:   Yes  What interventions are required if any?               Family  Comments: Son worried about refeeding syndrome  Worried about care at facility.  Does not want Hospice  OBS brochure/video discussed/provided to patient/family: yes              Name of person given brochure if not patient: na              Relationship to patient: na    For the majority of the shift this patient's behavior was Green.   Behavioral interventions performed were none.    ED NURSE PHONE NUMBER: **[id number]

## 2022-09-06 NOTE — PROGRESS NOTES
[facility]    Hospitalist Progress Note    Date of Service (when I saw the patient): 09/06/2022    Assessment & Plan   Efrem Ramos is a 77 year old male who was admitted on 9/5/2022.  Efrem Ramos is a 77 year old male admitted on 9/5/2022. He presents to the emergency department with ongoing diarrhea and worsening cough and is found to have atrial fibrillation with rapid ventricular rate, possible aspiration pneumonia with right-sided infiltrate in the setting of chronic dysphagia with recent placement of feeding tube.     Dyspnea: Likely multifactorial with chronic aspiration, pulmonary fibrosis associated with chronic aspiration and prior amiodarone toxicity, and acute on chronic valvular heart failure with severe tricuspid regurgitation and severe aortic stenosis likely exacerbated by atrial fibrillation with rapid ventricular rate.  -Cardiology consulted given my concern that there is a significant component of valvular heart failure and intolerance of A. fib RVR resulting in patient's dyspnea.  Therapeutic on digoxin, previously failed amiodarone in the setting of pulmonary complications.  Suspect that metoprolol use may be limited with chronic hypotension as well (Baseline systolic BPs appear to be in the 80s-100 range)  -Add on BNP   -As below, holding anti-infectives with undetectable procalcitonin  -Sputum culture, gram stain if able  -Cardiac telemetry  -Continue prior to admission digoxin  -As needed low-dose IV metoprolol for A. fib RVR.  Hold for systolic blood pressure less than 90  -Received 500 mL saline bolus in the emergency department.  Would be cautious with additional fluid given concern for valvular heart failure, though generally patient appears mildly hypovolemic to euvolemic.     Chronic aspiration: Had a percutaneous G-tube placed via surgical team at the end of July for chronic aspiration and failure to thrive; IR was not an option as colon was overlying  stomach.  Failure to thrive in an adult with severe protein calorie malnutrition:  -Nutrition consulted for resumption of tube feedings; may need to consider change in formulation given ongoing diarrhea despite as needed Imodium at care facility.  Was on Jevity 1.5 in July, uncertain what tube feeding formulation he was switched to at care facility.  -N.p.o.; meds and nutrition through feeding tube.  Tube feeding flushes per protocol  -Physical therapy consulted  -Could consider IR consultation to exchange G-tube for GJ tube, though jejunal feedings are more commonly associated with dumping syndrome, and patient already with persistent diarrhea after initiation on tube feeds.     Aspiration pneumonia, possible: Possible increased infiltrates on the right on x-ray, though does not clearly have hypoxia with oxygen saturations as high as 99% while at rest on room air in the emergency department.  Occasionally with poor waveform resulting in low reading, though patient does become quite dyspneic with minimal activity and increased heart rate.  -Received cefepime and azithromycin in the emergency department.  Procalcitonin added on and undetectable.  Discontinuing additional anti-infectives  -Add on Legionella urine antigen with mild hyponatremia and ongoing diarrhea.  Generally lower suspicion of this, though CT from 7/21/2022 was suggestive of atypical multifocal pneumonia. Treatment at last admission was with ceftriaxone and doxycycline, later clindamycin completed 8/12/2022.  He did receive azithromycin at last ER encounter as well as tonight in the emergency department, though I do not believe he ever completed a course.     Persistent diarrhea: Patient reports this has been an ongoing issue since G-tube placement.  He expresses an interest in colostomy so that he might avoid ongoing episodes of diarrhea and need for pericares.  Discussed with patient that a colostomy in this instance would be for quality of life,  and an elective procedure.  With his valvular heart failure and tenuous respiratory status with chronic aspiration, would be a poor surgical candidate for this, and focus should be on attempting to adjust to pain formula to treat likely diarrhea associated with tube feeds versus determine underlying etiology of diarrhea and provide treatment.  Note that in 2005, patient had a cecal biopsy with pathology consistent with collagenous colitis.  -Nutrition consulted for resumption of tube feeds; consider addition of fiber to feed regimen given persistent diarrhea  -Intake and output, daily weights; will need to match output  -C. difficile, enteric panel added on  -Potassium, magnesium replacement protocols in place  -Loperamide increased to 4 mg.  Added Neutrisource fiber 1 packet via G-tube twice a day to help with diarrhea  -[facility] consulted given possibility of collagenous colitis; timing of persistent diarrhea coinciding with initiation of tube feeds suggests against this  Nutrition services following      Stage I sacral pressure ulceration: Present on admission.  Nonblanching erythema present.  No open sores appreciated.  -Wound nurse consulted  -Frequent repositioning      Leukocytosis: White count of 18.3 with left shift, platelets elevated in the 600 range suggestive of inflammatory issue.  Noted negative procalcitonin, however.  Note that patient has had a persistent leukocytosis since the end of July  -C. difficile and enteric panel pending  -Add on CRP, ESR.  Note that historically has had elevated CRP which did improve with anti-infective treatment.  Negative procalcitonin at that time as well.     Coronary artery disease: History of extensive proximal to distal RCA stenting.  Patent on last angiogram in May 2020.  History of CVA  -Continue prior to admission apixaban anticoagulation  -Continue prior to admission Plavix  -Continue prior to admission atorvastatin      MGUS: February 2022 bone marrow biopsy with  hypercellular marrow including 7% plasma cells. Followed w/ [id number] SPEP through oncology service.  -continue with outpatient follow up           Diet:   N.p.o.; nutrition consulted for resumption of tube feeds  DVT Prophylaxis: DOAC  Bravo Catheter: Not present  Central Lines: None  Cardiac Monitoring: None  Code Status:   Full code.  Discussed and confirmed with patient on admission.        Clinically Significant Risk Factors Present on Admission            # Hyponatremia: Na = 132 mmol/L (Ref range: 133 - 144 mmol/L) on admission, will monitor as appropriate     # Hypoalbuminemia: Albumin = 2.0 g/dL (Ref range: 3.4 - 5.0 g/dL) on admission, will monitor as appropriate   # Coagulation Defect: home medication list includes an anticoagulant medication  # Platelet Defect: home medication list includes an antiplatelet medication              Disposition Plan      anticipate 9/8/2022 back to TCU    Discussed with bedside RN, patient today   Greater than 35 minutes were spent in taking care of this patient today  In reviewing the chart, counseling the patient in collaboration of care    Lizy Dias MD, MD  [phone] (P)      Interval History    Patient is new to me today, chart reviewed    Patient's blood pressure is soft systolics in the 70s to 80s.  Patient has chronic hypotension secondary to severe aortic stenosis.  His blood pressure was running in systolics in the 70s to 80s during last hospitalization when he was known to me.  He remains asymptomatic with these blood pressures so no intervention is needed.  No diarrhea so far as patient's tube feeds are currently on hold.  No other acute issues since admission    -Data reviewed today: I reviewed all new labs and imaging results over the last 24 hours. I personally reviewed no images or EKG's today.    Physical Exam   Temp: 99.1  F (37.3  C) Temp src: Oral BP: (!) 83/60 Pulse: (!) 129   Resp: 27 SpO2: 93 % O2 Device: None (Room air) Oxygen Delivery: 1  LPM  There were no vitals filed for this visit.  Vital Signs with Ranges  Temp:  [98.2  F (36.8  C)-99.1  F (37.3  C)] 99.1  F (37.3  C)  Pulse:  [] 129  Resp:  [15-27] 27  BP: ()/(39-76) 83/60  SpO2:  [89 %-99 %] 93 %  I/O last 3 completed shifts:  In: 350 [IV Piggyback:350]  Out: -     Constitutional: Awake, alert, cooperative, no apparent distress, very frail and cachectic appearing pleasant male lying comfortably in bed   Respiratory: Clear to auscultation bilaterally, no crackles or wheezing  Cardiovascular: Regular rate and rhythm, normal S1 and S2, and 3+ murmur noted  GI: Normal bowel sounds, soft, non-distended, non-tender  Skin/Integumen: No rashes, no cyanosis, no edema  Other:     Medications     dextrose       Reason anticoagulant not prescribed for atrial fibrillation       - MEDICATION INSTRUCTIONS -         acetaminophen  1,000 mg Per G Tube TID     apixaban ANTICOAGULANT  5 mg Per G Tube BID     atorvastatin  40 mg Per G Tube At Bedtime     calcium carbonate 600 mg-vitamin D 400 units  1 tablet Per G Tube BID     clopidogrel  75 mg Per G Tube Daily     digoxin  125 mcg Per G Tube Daily     ferrous sulfate  220 mg Per G Tube Daily     gabapentin  300 mg Per G Tube QAM     gabapentin  600 mg Per G Tube At Bedtime     mirtazapine  15 mg Per G Tube At Bedtime     multivitamin  with lutein  1 capsule Per G Tube Daily     sodium chloride (PF)  3 mL Intracatheter Q8H       Data   Recent Labs   Lab 09/05/22 2038 08/31/22  0710   WBC 18.3* 16.1*   HGB 9.3* 8.6*   MCV 99 100   * 464*   * 133   POTASSIUM 4.5 4.1   CHLORIDE 101 100   CO2 26 26   BUN 31* 25   CR 0.54* 0.50*   ANIONGAP 5 7   ULISSES 8.4* 8.2*   * 122*   ALBUMIN 2.0*  --    PROTTOTAL 8.4  --    BILITOTAL 0.5  --    ALKPHOS 110  --    ALT 16  --    AST 34  --    LIPASE 47*  --        Recent Results (from the past 24 hour(s))   XR Chest 2 Views    Narrative    EXAM: XR CHEST 2 VIEWS  LOCATION: [facility]  DATE/TIME: 9/5/2022 9:16 PM    INDICATION: Cough; hypoxemia; aspiration pneumonia  COMPARISON: 07/21/2022.      Impression    IMPRESSION: Persistent infiltrates throughout the right long, which appear slightly increased, as compared to the previous chest CT. Findings are superimposed on a background of pulmonary fibrosis.     Blunting of the right costophrenic angle appears chronic. No appreciably acute pleural effusion. No pneumothorax.    Unchanged mild cardiomegaly. Atherosclerotic calcifications involve the thoracic aorta, as well as some of the peripheral arterial vasculature. Dense calcifications of the mitral annulus.    Vertebroplasty changes near the thoracolumbar junction.

## 2022-09-06 NOTE — PROGRESS NOTES
RECEIVING UNIT ED HANDOFF REVIEW    ED Nurse Handoff Report was reviewed by: Aidan Jain RN on September 6, 2022 at 4:01 AM

## 2022-09-06 NOTE — ED NOTES
Pt reassessed, bp soft, md aware. 1L almost complete, Md opened up remaining bolus when in room. Pt received ix ax. Co feeling tired. Not dizzy denies pain when asked. SR up x 2. Brief dry no belly pain. On 2L o2 nasla prongs.

## 2022-09-06 NOTE — PHARMACY-ADMISSION MEDICATION HISTORY
Pharmacy Medication History  Admission medication history interview status for the 9/5/2022  admission is complete. See EPIC admission navigator for prior to admission medications     Location of Interview: Outside patient room but on unit  Medication history sources: MAR ([facility])    Significant changes made to the medication list:  Added: ferrous sulfate, Duoneb    In the past week, patient estimated taking medication this percent of the time: greater than 90%    Additional medication history information:   none    Medication reconciliation completed by provider prior to medication history? No    Time spent in this activity: 15 mins    Prior to Admission medications    Medication Sig Last Dose Taking? Auth Provider Long Term End Date   acetaminophen (TYLENOL) 500 MG tablet 1,000 mg by Per G Tube route 3 times daily 9/5/2022 at 1400 Yes Reported, Patient No    albuterol (PROVENTIL) (2.5 MG/3ML) 0.083% neb solution Take 2.5 mg by nebulization 3 times daily 9/5/2022 at AM Yes Reported, Patient  9/5/22   apixaban ANTICOAGULANT (ELIQUIS) 5 MG tablet Take 1 tablet (5 mg) by mouth 2 times daily  Patient taking differently: 5 mg by Per G Tube route 2 times daily 9/5/2022 at AM Yes Caterina Wright PA-C No    atorvastatin (LIPITOR) 40 MG tablet Take 1 tablet (40 mg) by mouth daily  Patient taking differently: 40 mg by Per G Tube route At Bedtime 9/4/2022 at HS Yes Cherise Raymond PA-C Yes    calcium carbonate 600 mg-vitamin D 400 units (CALTRATE) 600-400 MG-UNIT per tablet 1 tablet by Per G Tube route 2 times daily 9/5/2022 at AM Yes Reported, Patient     clopidogrel (PLAVIX) 75 MG tablet Take 1 tablet (75 mg) by mouth daily  Patient taking differently: 75 mg by Per G Tube route daily 9/5/2022 at AM Yes Tariq Cortes MD Yes    diclofenac (VOLTAREN) 1 % topical gel Apply 4 g topically 4 times daily as needed for moderate pain  at PRN Yes Danny Paige MD     digoxin  (LANOXIN) 125 MCG tablet Take 1 tablet (125 mcg) by mouth daily  Patient taking differently: 125 mcg by Per G Tube route daily 9/5/2022 at AM Yes Cherise Raymond PA-C Yes    ferrous sulfate 220 (44 Fe) MG/5ML ELIX 220 mg by Per G Tube route daily 9/4/2022 at AM Yes Unknown, Entered By History     gabapentin (NEURONTIN) 300 MG capsule 300 mg by Per G Tube route every morning At 0800 9/5/2022 at 0800 Yes Unknown, Entered By History Yes    gabapentin (NEURONTIN) 300 MG capsule Take 2 capsules (600 mg) by mouth At Bedtime  Patient taking differently: 600 mg by Per G Tube route At Bedtime 9/4/2022 at HS Yes Danny Paige MD Yes    ipratropium - albuterol 0.5 mg/2.5 mg/3 mL (DUONEB) 0.5-2.5 (3) MG/3ML neb solution Take 1 vial by nebulization every 4 hours as needed for shortness of breath / dyspnea or wheezing  at PRN Yes Unknown, Entered By History No    Lidocaine (LIDOCARE) 4 % Patch Place 1 patch onto the skin every 24 hours To prevent lidocaine toxicity, patient should be patch free for 12 hrs daily. Apply to abdomen 9/5/2022 at 0800 Yes Reported, Patient     loperamide (IMODIUM) 1 MG/5ML liquid 2 mg by Per G Tube route 4 times daily as needed for diarrhea 9/5/2022 at 1754 Yes Reported, Patient     meclizine (ANTIVERT) 25 MG tablet Take 1 tablet (25 mg) by mouth 2 times daily  Patient taking differently: 25 mg by Per G Tube route 2 times daily 9/5/2022 at AM Yes Haase, Tonya Lynn, APRN CNP     melatonin 3 MG tablet 6 mg by Per G Tube route At Bedtime 9/4/2022 at HS Yes Reported, Patient     mirtazapine (REMERON) 15 MG tablet Take 1 tablet (15 mg) by mouth At Bedtime  Patient taking differently: 15 mg by Per G Tube route At Bedtime 9/4/2022 at HS Yes Danny Paige MD Yes    multivitamin (OCUVITE) TABS 1 tablet by Per G Tube route daily 9/5/2022 at AM Yes Unknown, Entered By History     oxyCODONE (ROXICODONE) 5 MG tablet Take 0.5 tablets (2.5 mg) by mouth every 6 hours as needed for  severe pain  Patient taking differently: 2.5 mg by Per G Tube route every 6 hours as needed for severe pain  at PRN Yes Patric Sutton, NP     potassium chloride ER (K-TAB/KLOR-CON) 10 MEQ CR tablet Take 20 mEq by mouth daily Give via G-tube. Do not crush medication, make a slurry. 9/5/2022 at AM Yes Reported, Patient     senna-docusate (SENOKOT-S/PERICOLACE) 8.6-50 MG tablet 2 tablets by Per G Tube route 2 times daily as needed for constipation  at PRN Yes Reported, Patient         The information provided in this note is only as accurate as the sources available at the time of update(s)

## 2022-09-06 NOTE — UTILIZATION REVIEW
Admission Status; Secondary Review Determination       Under the authority of the Utilization Management Committee, the utilization review process indicated a secondary review on the above patient. The review outcome is based on review of the medical records, discussions with staff, and applying clinical experience noted on the date of the review.     (x) Inpatient Status Appropriate - This patient's medical care is consistent with medical management for inpatient care and reasonable inpatient medical practice.     RATIONALE FOR DETERMINATION   77 year old male admitted on 9/5/2022. He presents to the emergency department with ongoing diarrhea and worsening cough and is found to have atrial fibrillation with rapid ventricular rate, possible aspiration pneumonia with right-sided infiltrate in the setting of chronic dysphagia with recent placement of feeding tube.   Patient requires inpatient admission versus short stay observation or outpatient treatment for the following reasons: Very complex patient with multiple issues to be addressed including concern for aspiration pneumonia, failure to thrive with severe malnutrition, persistent diarrhea and hypotension.  Anticipated at least 2 more days per attending physician of hospital evaluation and management.      The expected length of stay at the time of admission was more than 2 nights because of the severity of illness, intensity of service provided, and risk for adverse outcome. Inpatient admission is appropriate.         This document was produced using voice recognition software       The information on this document is developed by the utilization review team in order for the business office to ensure compliance. This only denotes the appropriateness of proper admission status and does not reflect the quality of care rendered.   The definitions of Inpatient Status and Observation Status used in making the determination above are those provided in the CMS  Coverage Manual, Chapter 1 and Chapter 6, section 70.4.   Sincerely,   JIMBO WOOD MD   System Medical Director   Utilization Management   [facility].

## 2022-09-06 NOTE — PLAN OF CARE
Goal Outcome Evaluation:  Pt A/O x 4. No complaints of pain. Afib RVR. Cards rounded. Wound care rounded. Turn Q 2. Up in the chair, stand to pivot with 1-2 and walker. Blanchable redness on coccyx. Tube feed started, plan to increase rate @ 1830. Productive cough intermittent.

## 2022-09-06 NOTE — CONSULTS
"[facility]  WOC Nurse Inpatient Assessment     Consulted for: sacral       Areas Assessed:      Areas visualized during today's visit: Focused: and Sacrum/coccyx    Wound location: buttock         Last photo: 9/6  Wound due to: charted as CAPI stage 1 by primary RN, found blanchable erythema   Wound history/plan of care: found with mepilex in place   Wound base: 100 % blanchable  and epidermis     Palpation of the wound bed: normal      Drainage: none     Description of drainage: none     Measurements (length x width x depth, in cm): without open area       Tunneling: N/A     Undermining: N/A  Periwound skin: Intact      Color: normal and consistent with surrounding tissue      Temperature: normal   Odor: none  Pain: denies during consult, reports positional pain at times, none  Pain interventions prior to dressing change: patient tolerated well  Treatment goal: Maintain (prevention of deterioration) and Protection  STATUS: initial assessment  Supplies ordered: supplies stored on unit       Treatment Plan:      Wound care  Start:  09/07/22 0600,   DAILY,   Routine        Comments: Location: buttock   Care: provided daily by primary RN   1. Provide incontinence care per protocol each episode, with katharina cleanser spray and soft dry wipes   2. Apply 4x4\" mepilex in leroy configuration (this will help protect coccyx and avoid loose stool soiling the mepilex)   3. Apply criticaid paste to perianal (not under mepilex) with each loose stool             Orders: Written    RECOMMEND PRIMARY TEAM ORDER: None, at this time  Education provided: importance of repositioning, plan of care, Moisture management, Hygiene, Off-loading pressure and head of bed at 30 degrees   Discussed plan of care with: Patient and Nurse  WOC nurse follow-up plan: signing off  Notify WOC if wound(s) deteriorate.  Nursing to notify the Provider(s) and re-consult the WOC Nurse if new skin concern.    DATA:     Current support " surface: Standard  Atmos Air mattress, pulsate ordered with consult   Containment of urine/stool: Incontinence Protocol  BMI: There is no height or weight on file to calculate BMI.   Active diet order: Orders Placed This Encounter      NPO for Medical/Clinical Reasons Except for: NPO but receiving Tube Feeding     Output: I/O last 3 completed shifts:  In: 350 [IV Piggyback:350]  Out: -      Labs: Recent Labs   Lab 09/06/22  0634 09/05/22 2038   ALBUMIN  --  2.0*   HGB  --  9.3*   WBC  --  18.3*   CRP 88.1*  --      Pressure injury risk assessment:   Sensory Perception: 3-->slightly limited  Moisture: 3-->occasionally moist  Activity: 2-->chairfast  Mobility: 2-->very limited  Nutrition: 2-->probably inadequate  Friction and Shear: 2-->potential problem  Marv Score: 14    Anum BRADLEY   Dept. Pager: [phone]  Dept. Office Number: [phone]

## 2022-09-06 NOTE — ED PROVIDER NOTES
History   Chief Complaint:  Generalized Weakness and Shortness of Breath       HPI   Efrem Ramos is a 77 year old male with history of multiple medical conditions including atrial fibrillation on digoxin and who presents with his son for evaluation of new cough since yesterday as well as new oxygen dependence tonight as nursing facility.  He has a G-tube through which she gets all of his nutrition as of the summer.  Since it was placed he has often explosive diarrhea often without realizing that its about to happen.  There is no blood associated with this.  The son expresses concern that it could be an intolerance to the formula and notes that it was changed once.  There has been at least 1 rate adjustment with the infusion to see if that helped as well.  He has noticed that his father is not does not always have the head of the bed up when the tube feeds are occurring and is at risk for aspiration.  He expresses frustration around the communication and care of his father.  He also expresses concern for the possibility of refeeding syndrome as he was not eating much prior to having the G-tube placed.  I spoke with the patient briefly before his son arrived in the room.  He is alert and conversant and commented that he recently talked to someone from hospice but admits he did not really understand all of the conversation.  Recent blood pressures on facility EMR ranged from  systolic.  Heart rates are not recorded.    Review of Systems  Ten system ROS reviewed and is negative except as above     Allergies:    Sulfa Drugs  Adhesive Tape  Amiodarone  Latex  Cephalosporins  Penicillins    Medications:    acetaminophen (TYLENOL) 500 MG tablet  albuterol (PROVENTIL) (2.5 MG/3ML) 0.083% neb solution  apixaban ANTICOAGULANT (ELIQUIS) 5 MG tablet  atorvastatin (LIPITOR) 40 MG tablet  calcium carbonate 600 mg-vitamin D 400 units (CALTRATE) 600-400 MG-UNIT per tablet  clopidogrel (PLAVIX) 75 MG tablet  diclofenac  (VOLTAREN) 1 % topical gel  digoxin (LANOXIN) 125 MCG tablet  gabapentin (NEURONTIN) 300 MG capsule  gabapentin (NEURONTIN) 300 MG capsule  Lidocaine (LIDOCARE) 4 % Patch  loperamide (IMODIUM) 1 MG/5ML liquid  meclizine (ANTIVERT) 25 MG tablet  melatonin 3 MG tablet  mirtazapine (REMERON) 15 MG tablet  multivitamin (OCUVITE) TABS  oxyCODONE (ROXICODONE) 5 MG tablet  potassium chloride ER (K-TAB/KLOR-CON) 10 MEQ CR tablet  senna-docusate (SENOKOT-S/PERICOLACE) 8.6-50 MG tablet        Past Medical History:       Patient Active Problem List   Diagnosis     Encephalopathy     Mixed hyperlipidemia     Other specified disorder of skin     MEL (obstructive sleep apnea)     PVD (peripheral vascular disease) (H)     Decreased diffusion capacity     Anemia     Vocal cord cancer (H)     Lower extremity edema     Lymphocytic colitis     IgG monoclonal gammopathy     CARDIOVASCULAR SCREENING; LDL GOAL LESS THAN 100     Vitamin B12 deficiency disease     Abnormal liver function tests     Collagenous colitis     Redundant colon     Rash and nonspecific skin eruption     Cerebral infarction, unspecified (H)     Alcohol abuse     Health Care Home     Atrial fibrillation (H)     Hypoxia     Acute respiratory failure with hypoxia (H)     Physical deconditioning     Urinary retention     Community acquired pneumonia     Constipation     Hypokalemia     Non-traumatic compression fracture of L1 lumbar vertebra with routine healing, subsequent encounter     Age-related osteoporosis with current pathological fracture with routine healing, subsequent encounter     Benign essential hypertension     Other secondary pulmonary hypertension (H)     Edema due to malnutrition, due to unspecified malnutrition type (H)     Abnormal findings on diagnostic imaging of heart and coronary circulation     Coronary artery disease involving native coronary artery of native heart without angina pectoris     Bleeding     Severe aortic stenosis     Cardiogenic  shock (H)     Atrial flutter (H)     SVT (supraventricular tachycardia) (H)     Nonrheumatic mitral valve stenosis     Alcohol dependence (H)     Weakness     Abnormal chest x-ray     Leukocytosis, unspecified type     Chronic obstructive pulmonary disease, unspecified COPD type (H)     Failure to thrive in adult     Pain     Adjustment insomnia     Functional diarrhea     MCI (mild cognitive impairment)     Heteronymous bilateral field defects     Dizziness     Acute disseminated encephalitis and encephalomyelitis, unspecified     Abnormal weight loss     Other sequelae of cerebral infarction     Other abnormalities of gait and mobility     Noninfectious gastroenteritis        Past Surgical History:      Past Surgical History:   Procedure Laterality Date     BIOPSY  brain 2002     BONE MARROW BIOPSY, BONE SPECIMEN, NEEDLE/TROCAR N/A 6/8/2017    Procedure: BIOPSY BONE MARROW;  UNILATERAL BONE MARROW BIOPSY (CONSCIOUS SEDATION) ;  Surgeon: Jamie Gonzales MD;  Location:  GI     BONE MARROW BIOPSY, BONE SPECIMEN, NEEDLE/TROCAR N/A 2/23/2022    Procedure: BIOPSY, BONE MARROW;  Surgeon: Carmelita Aguilera MD;  Location:  GI     CARDIAC CATHERIZATION  09/05/2017    2V CAD, IFR of RCA 0.95     CV CORONARY ANGIOGRAM N/A 3/23/2020    Procedure: Coronary Angiogram and right heart cath;  Surgeon: Gino Ferrer MD;  Location:  HEART CARDIAC CATH LAB     CV HEART CATHETERIZATION WITH POSSIBLE INTERVENTION N/A 5/28/2020    Procedure: Heart Catheterization with Possible Intervention;  Surgeon: Larry Chawla MD;  Location:  HEART CARDIAC CATH LAB     CV HEART CATHETERIZATION WITH POSSIBLE INTERVENTION N/A 5/18/2020    Procedure: Heart Catheterization with Possible Intervention;  Surgeon: Gaurang Swenson MD;  Location:  HEART CARDIAC CATH LAB     CV INSTANTANEOUS WAVE-FREE RATIO N/A 3/23/2020    Procedure: Instantaneous Wave-Free Ratio;  Surgeon: Gino Ferrer MD;  Location:   HEART CARDIAC CATH LAB     CV PCI ATHERECTOMY ORBITAL N/A 5/18/2020    Procedure: Percutaneous Coronary Intervention Atherectomy Rotational;  Surgeon: Gaurang Swenson MD;  Location:  HEART CARDIAC CATH LAB     CV PCI STENT DRUG ELUTING N/A 5/18/2020    Procedure: Percutaneous Coronary Intervention Stent Drug Eluting;  Surgeon: Gaurang Swenson MD;  Location:  HEART CARDIAC CATH LAB     CV RIGHT HEART CATH MEASUREMENTS RECORDED N/A 5/28/2020    Procedure: Right Heart Cath;  Surgeon: Larry Chawla MD;  Location:  HEART CARDIAC CATH LAB     CV TEMPORARY PACEMAKER INSERTION N/A 5/18/2020    Procedure: Temporary Pacemaker Insertion;  Surgeon: Gaurang Swenson MD;  Location:  HEART CARDIAC CATH LAB     ESOPHAGOSCOPY, GASTROSCOPY, DUODENOSCOPY (EGD), COMBINED N/A 9/8/2018    Procedure: COMBINED ESOPHAGOSCOPY, GASTROSCOPY, DUODENOSCOPY (EGD), BIOPSY SINGLE OR MULTIPLE;;  Surgeon: Xander Marie MD;  Location:  GI     HEAD & NECK SURGERY       IR GASTROSTOMY TUBE PERCUTANEOUS PLCMNT  7/27/2022     LAPAROSCOPIC ASSISTED INSERTION TUBE GASTROTOMY N/A 7/28/2022    Procedure: LAPAROSCOPIC ASSISTED GASTROSTOMY TUBE PLACEMENT;  Surgeon: Vicente Weber MD;  Location:  OR     ORTHOPEDIC SURGERY      right arm ulna reset after injury     THORACOSCOPIC WEDGE RESECTION LUNG Right 8/2/2016    Procedure: THORACOSCOPIC WEDGE RESECTION LUNG;  Surgeon: Abdelrahman Noriega MD;  Location:  OR     [facility] NONSPECIFIC PROCEDURE  as a child    T & A. abstracted 7/3/02.     [facility] NONSPECIFIC PROCEDURE  early    CTR. abstracted 7/3/02.        Family History:      Family History   Problem Relation Age of Onset     Blood Disease Mother         Anemia     Cardiovascular Father      Cancer - colorectal Maternal Grandfather      Hypertension Brother      Diabetes Sister 5        Juvinile Diabetes passed at 36     Respiratory Other         Lung Cancer       Social History:  Son present, lives at  [place]  PCP: Danny Paige     Physical Exam     Patient Vitals for the past 24 hrs:   BP Temp Temp src Pulse Resp SpO2   09/05/22 2009 96/58 -- -- -- -- --   09/05/22 2008 -- 98.4  F (36.9  C) Temporal 118 20 97 %       Physical Exam  Eyes:  Sclera white; Pupils are equal and round  ENT:    External ears and nares normal  CV:  Rate as above with irregular rhythm   Resp:  Breath sounds clear and equal bilaterally    Non-labored, no retractions or accessory muscle use  GI:  Abdomen is soft, non-tender, non-distended; g tube dressing clean, G tube tubing intact    No rebound tenderness or peritoneal features  MS:  Moves all extremities  Skin:  Warm and dry  Neuro:  Speech is normal and fluent. Alert.  Conversant.      Emergency Department Course   ECG  Time: 2051  Vent. Rate 119 bpm. [place] interval *. QRS duration 96. QT/QTc 330/464.  Read time: 2100  Interpretation: Atrial fibrillation with rapid ventricular response, incomplete right bundle branch block, left anterior fascicular block, abnormal ECG  Interpreted by Dr. Hernandez.  Agree.  Previously was rate controlled at a rate of 80 on 7/21/2022       Imaging:  XR Chest 2 Views   Final Result   IMPRESSION: Persistent infiltrates throughout the right long, which appear slightly increased, as compared to the previous chest CT. Findings are superimposed on a background of pulmonary fibrosis.       Blunting of the right costophrenic angle appears chronic. No appreciably acute pleural effusion. No pneumothorax.      Unchanged mild cardiomegaly. Atherosclerotic calcifications involve the thoracic aorta, as well as some of the peripheral arterial vasculature. Dense calcifications of the mitral annulus.      Vertebroplasty changes near the thoracolumbar junction.           Report per radiology    Laboratory:  Labs Ordered and Resulted from Time of ED Arrival to Time of ED Departure   COMPREHENSIVE METABOLIC PANEL - Abnormal       Result Value    Sodium 132 (*)      Potassium 4.5      Chloride 101      Carbon Dioxide (CO2) 26      Anion Gap 5      Urea Nitrogen 31 (*)     Creatinine 0.54 (*)     Calcium 8.4 (*)     Glucose 101 (*)     Alkaline Phosphatase 110      AST 34      ALT 16      Protein Total 8.4      Albumin 2.0 (*)     Bilirubin Total 0.5      GFR Estimate >90     LIPASE - Abnormal    Lipase 47 (*)    CBC WITH PLATELETS AND DIFFERENTIAL - Abnormal    WBC Count 18.3 (*)     RBC Count 3.00 (*)     Hemoglobin 9.3 (*)     Hematocrit 29.6 (*)     MCV 99      MCH 31.0      MCHC 31.4 (*)     RDW 16.3 (*)     Platelet Count 602 (*)     % Neutrophils 81      % Lymphocytes 7      % Monocytes 9      % Eosinophils 1      % Basophils 1      % Immature Granulocytes 1      NRBCs per 100 WBC 0      Absolute Neutrophils 14.7 (*)     Absolute Lymphocytes 1.3      Absolute Monocytes 1.7 (*)     Absolute Eosinophils 0.3      Absolute Basophils 0.1      Absolute Immature Granulocytes 0.2      Absolute NRBCs 0.0     ISTAT GASES LACTATE VENOUS POCT - Abnormal    Lactic Acid POCT 2.5 (*)     Bicarbonate Venous POCT 25      O2 Sat, Venous POCT 70 (*)     pCO2V Venous POCT 36 (*)     pH Venous POCT 7.45 (*)     pO2 Venous POCT 35     NT PROBNP INPATIENT - Abnormal    N terminal Pro BNP Inpatient 4,330 (*)    DIGOXIN LEVEL - Normal    Digoxin 0.9     MAGNESIUM - Normal    Magnesium 2.2     TSH WITH FREE T4 REFLEX - Normal    TSH 3.74     PHOSPHORUS - Normal    Phosphorus 4.5     INFLUENZA A/B & SARS-COV2 PCR MULTIPLEX - Normal    Influenza A PCR Negative      Influenza B PCR Negative      RSV PCR Negative      SARS CoV2 PCR Negative     LACTIC ACID WHOLE BLOOD - Normal    Lactic Acid 1.3     PROCALCITONIN - Normal    Procalcitonin <0.05     BLOOD CULTURE   BLOOD CULTURE   CLOSTRIDIUM DIFFICILE TOXIN B   ENTERIC BACTERIA AND VIRUS PANEL BY MIGUEL ÁNGEL STOOL        Procedures    Emergency Department Course:    Interventions:  Medications   0.9% sodium chloride BOLUS (0 mLs Intravenous Stopped 9/5/22  2719)     Followed by   sodium chloride 0.9% infusion ( Intravenous Not Given 9/5/22 2347)   azithromycin (ZITHROMAX) 500 mg vial to attach to  mL bag (500 mg Intravenous New Bag 9/5/22 2347)   metoprolol (LOPRESSOR) injection 2.5 mg (has no administration in time range)   ceFEPIme (MAXIPIME) 2 g vial to attach to  ml bag for ADULTS or 50 ml bag for PEDS (2 g Intravenous New Bag 9/5/22 2235)      Disposition:  The patient was admitted to the hospital under the care of Dr. Franks.     Impression & Plan     CMS Diagnoses: The Lactic acid level is elevated due to dehydration, at this time there is no sign of severe sepsis or septic shock. and None    Medical Decision Making:  EKG obtained without signs of ischemia.  Has known atrial fibrillation and is currently in this may be reactive to infection or dehydration and therefore with rate control he deferred until more of his work-up is completed.  He has multiple blood pressures in the 90s during his last hospitalization.  I do not consider his current blood pressures to reflect shock.  Lactic acid and white blood cell count was noted to be elevated.  This can be seen with sepsis but can also be seen with diarrhea and dehydration.  Antibiotics were discussed with the pharmacist due to associated allergies.  Cefepime and azithromycin given.  Blood cultures are in process.  After IV fluids lactic acid has normalized.  He remains tachycardic which was discussed with the hospitalist.  He is not currently on rate control agents on his MAR.  Recommended low-dose IV metoprolol.  I conveyed his son's concerns regarding possible GI intolerance to his current tube feeds and concerns regarding ongoing possible aspiration.    Diagnosis:    ICD-10-CM    1. Aspiration pneumonia of right lower lobe, unspecified aspiration pneumonia type (H)  J69.0    2. Chronic diarrhea  K52.9    3. Chronic atrial fibrillation (H)  I48.20    4. Atrial fibrillation with rapid ventricular  response (H)  I48.91    5. Elevated lactic acid level  R79.89    6. Chronic hypotension  I95.89           Nava Hernandez MD  09/06/22 0019

## 2022-09-06 NOTE — CONSULTS
"CLINICAL NUTRITION SERVICES  -  ASSESSMENT NOTE    Recommendations Ordered by Registered Dietitian (RD):   Vital 1.5 Hunter @ goal of 45ml/hr (1080ml/day) to provide:   1620 kcals (31 kcal/kg), 72 g PRO (1.4 g/kg), 825 ml free H20, 201 g CHO, and 6 g fiber daily.  - flush 130 mL q 4 hours    Future/Additional Recommendations:   - Cycle overnight as able    Malnutrition:   % Weight Loss:  > 10% in 6 months (severe malnutrition)  % Intake:  </= 75% for >/= 1 month (severe malnutrition) --?malabsorption in setting of chronic diarrhea  Subcutaneous Fat Loss:  Orbital region moderate depletion and Upper arm region moderate depletion  Muscle Loss:  Temporal region moderate-severe depletion, Clavicle bone region moderate depletion, Dorsal hand region moderate depletion, Patellar region moderate-severe depletion, Anterior thigh region moderate depletion and Posterior calf region moderate-severe depletion  Fluid Retention:  None noted    Malnutrition Diagnosis: Severe malnutrition  In Context of:  Acute illness or injury  Chronic illness or disease     REASON FOR ASSESSMENT  Efrem Ramos is a 77 year old male seen by Registered Dietitian for Provider Order - Registered Dietitian to Assess and Order TF per Medical Nutrition Therapy Protocol  \"Ongoing diarrhea w/ tube feeds, consider formulation change (uncertain current/prior formula trialed)\"    NUTRITION HISTORY  - Information obtained from patient and chart review.   - Pt known from recent admissions. GT placed in July.   - Admitted from [facility]    - Last regimen at this facility is Jevity 1.5 @ 50 mL/hr to provide 1800 kcal, 76 g protein, 912 mL H2O, 258 g CHO, 25 g fiber   Flush 140 mL q 4 hours     - According to MD notes while admitted to TCU, formula was changed to Promote w/ Fiber @ 75 mL/hr a few weeks ago.     - Pt stated that he thought his diarrhea got better initially when the formula was changed, but then it \"got worse\". He reported waking up with his bed " "full of stool, even leaking over to the floor. Pt has concerns for malabsorption given the amount of diarrhea.   - He was unable to state what his recent weight have been.     CURRENT NUTRITION ORDERS  Diet Order:     NPO     Current Intake/Tolerance:  N/A - RD to start TF (orders entered)     NUTRITION FOCUSED PHYSICAL ASSESSMENT FOR DIAGNOSING MALNUTRITION)  Yes         Observed:    Muscle wasting (refer to documentation in Malnutrition section) and Subcutaneous fat loss (refer to documentation in Malnutrition section)    Obtained from Chart/Interdisciplinary Team:  \"Wasting of muscles of mastication\"  GT in place    ANTHROPOMETRICS  Height: 5' 7\"  Weight: 52.6 kg (116 lb)   BMI: 18.17 kg/m2  Weight Status:  Underweight BMI <18.5  IBW: 67.3 kg   % IBW: 78%  Weight History: Wt measured 9/5 is consistent with weight from the end of July (53 kg). ~15-20# loss in the past 6 months (11% at least)  Wt Readings from Last 20 Encounters:   09/05/22 52.6 kg (116 lb)   08/30/22 54 kg (119 lb 1.6 oz)   08/24/22 53.8 kg (118 lb 9.6 oz)   08/22/22 57.2 kg (126 lb 1.6 oz)   08/17/22 57.2 kg (126 lb 1.6 oz)   08/17/22 57.2 kg (126 lb 1.6 oz)   08/15/22 57.2 kg (126 lb 1.6 oz)   08/09/22 64.1 kg (141 lb 6.4 oz)   08/07/22 64.1 kg (141 lb 6.4 oz)   08/02/22 52.6 kg (115 lb 15.4 oz)   06/13/22 54.4 kg (120 lb)   04/19/22 60.8 kg (134 lb)   04/04/22 60.8 kg (134 lb)   03/10/22 60.8 kg (134 lb)   03/02/22 60 kg (132 lb 3.2 oz)   02/21/22 59.4 kg (131 lb)   02/16/22 61 kg (134 lb 6.4 oz)   02/14/22 60.9 kg (134 lb 3.2 oz)   02/09/22 59.5 kg (131 lb 1.6 oz)   02/06/22 60.2 kg (132 lb 11.2 oz)       LABS  Labs reviewed  CRP 88.1 (H)    MEDICATIONS  Medications reviewed  Caltrate BID  Remeron 15 mg q HS  Ocuvite w/ Lutein daily   44 mg elemental Iron     ASSESSED NUTRITION NEEDS PER APPROVED PRACTICE GUIDELINES:  Dosing Weight 52.6 kg   Estimated Energy Needs: 6422-0137 kcals (30-35 Kcal/Kg)  Justification: repletion and " underweight  Estimated Protein Needs:  grams protein (1.2-2 g pro/Kg)  Justification: maintenance  Estimated Fluid Needs: 1 mL/kcal  Justification: maintenance    MALNUTRITION:  % Weight Loss:  > 10% in 6 months (severe malnutrition)  % Intake:  </= 75% for >/= 1 month (severe malnutrition) --?malabsorption in setting of chronic diarrhea  Subcutaneous Fat Loss:  Orbital region moderate depletion and Upper arm region moderate depletion  Muscle Loss:  Temporal region moderate-severe depletion, Clavicle bone region moderate depletion, Dorsal hand region moderate depletion, Patellar region moderate-severe depletion, Anterior thigh region moderate depletion and Posterior calf region moderate-severe depletion  Fluid Retention:  None noted    Malnutrition Diagnosis: Severe malnutrition  In Context of:  Acute illness or injury  Chronic illness or disease    NUTRITION DIAGNOSIS:  Altered GI function related to poor TF tolerance as evidenced by persistent diarrhea for the past month or so since starting GT feedings.     NUTRITION INTERVENTIONS  Recommendations / Nutrition Prescription  Start Peptide-Based Enteral formula -->     Vital 1.5 Hunter @ goal of 45ml/hr (1080ml/day) to provide:   1620 kcals (31 kcal/kg), 72 g PRO (1.4 g/kg), 825 ml free H20, 201 g CHO, and 6 g fiber daily.  - flush 130 mL q 4 hours     Implementation  Nutrition education: Provided education on formula change  EN Composition, EN Schedule, Feeding Tube Flush: ordered  Collaboration and Referral of Nutrition care: Briefly discussed with GI PA    Nutrition Goals  TF to reach goal in the next 48 hours.   No more than 3 BM daily.     MONITORING AND EVALUATION:  Progress towards goals will be monitored and evaluated per protocol and Practice Guidelines    Manjula Lau RD, LD  [facility], [facility], Ortho, [facility]  Pager: [phone]  Weekend Pager: [phone]

## 2022-09-06 NOTE — PROGRESS NOTES
Sw:  D: VM left with [facility] admissions to confirm Bed hold. Writer waiting to hear.    P: Care management will continue to follow for safe discharge planning.       Addendum 1045: VM received from Edwin at [facility]. Edwin stated in his voicemail that family/patient did not hold his bed.     DAVIE García, [facility]   Social Work   [facility]

## 2022-09-06 NOTE — CONSULTS
GASTROENTEROLOGY CONSULTATION      Efrem Ramos  [address]  [facility] [address]  77 year old male     Admission Date/Time: 9/5/2022  Primary Care Provider: Danny Paige  Referring / Attending Physician:  Dr. Franks     We were asked to see the patient in consultation by Dr. Franks for evaluation of diarrhea.        HPI:  Efrem Ramos is a 77 year old male with complex past medical history including coronary artery disease, valvular heart disease (severe aortic stenosis and severe tricuspid regurg), atrial fibrillation/atrial flutter on digoxin, previous stroke, monoclonal gammopathy of uncertain significance being followed by oncology, failure to thrive with chronic aspiration requiring recent G-tube placement at the end of July ( 7/28/2022).  Gastroenterology was consulted given reports of persistent diarrhea.    Patient reports that he is struggled with diarrhea fairly chronically through the years.  He was diagnosed with microscopic colitis after a colonoscopy in 2013.  He notes that at his baseline he will have 2-3 loose bowel movements per day.  This was manageable for him as they were not urgent and he could get to the bathroom.  However after his G-tube was placed in July he noticed worsening of his diarrhea.  He will now have urgent bowel movements at least 5-7 times per day.  He often could not get to the bathroom and was incontinent.  To his knowledge there has not been any black or bloody stool.  He denies any abdominal pain or cramping before bowel movement.  He last had a CT colonography in 2013 after an incomplete colonoscopy which led to his diagnosis of microscopic colitis.  At home the patient does not use anything daily for diarrhea.        PAST MEDICAL HISTORY:  Patient Active Problem List    Diagnosis Date Noted     Chronic diarrhea 09/05/2022     Priority: Medium     Atrial fibrillation with rapid ventricular response (H) 09/05/2022     Priority: Medium     Aspiration  pneumonia of right lower lobe, unspecified aspiration pneumonia type (H) 09/05/2022     Priority: Medium     Functional diarrhea 08/18/2022     Priority: Medium     Failure to thrive in adult 08/08/2022     Priority: Medium     Pain 08/08/2022     Priority: Medium     Adjustment insomnia 08/08/2022     Priority: Medium     Chronic obstructive pulmonary disease, unspecified COPD type (H) 03/07/2022     Priority: Medium     MCI (mild cognitive impairment) 03/07/2022     Priority: Medium     Dizziness 03/07/2022     Priority: Medium     Weakness 01/11/2022     Priority: Medium     Abnormal chest x-ray 01/11/2022     Priority: Medium     Leukocytosis, unspecified type 01/11/2022     Priority: Medium     Alcohol dependence (H) 10/26/2021     Priority: Medium     Atrial flutter (H)      Priority: Medium     SVT (supraventricular tachycardia) (H)      Priority: Medium     Nonrheumatic mitral valve stenosis      Priority: Medium     Bleeding 05/18/2020     Priority: Medium     Coronary artery disease involving native coronary artery of native heart without angina pectoris 05/08/2020     Priority: Medium     Cath 5/28/2020: patent stents; Cath 5/18/2020: ISABEL x4 to RCA; Cath 3/2020: 1 vessel disease; Cath 2017: moderate 2 vessel disease       Severe aortic stenosis 05/08/2020     Priority: Medium     Added automatically from request for surgery [id number]       Cardiogenic shock (H) 05/08/2020     Priority: Medium     Added automatically from request for surgery [id number]       Abnormal findings on diagnostic imaging of heart and coronary circulation 03/16/2020     Priority: Medium     Added automatically from request for surgery [id number]       Other secondary pulmonary hypertension (H) 01/16/2019     Priority: Medium     Edema due to malnutrition, due to unspecified malnutrition type (H) 01/16/2019     Priority: Medium     Benign essential hypertension 11/20/2018     Priority: Medium     Abnormal weight loss 11/14/2018      Priority: Medium     Non-traumatic compression fracture of L1 lumbar vertebra with routine healing, subsequent encounter 10/09/2018     Priority: Medium     Age-related osteoporosis with current pathological fracture with routine healing, subsequent encounter 10/09/2018     Priority: Medium     Hypokalemia 09/05/2018     Priority: Medium     Acute respiratory failure with hypoxia (H) 08/10/2016     Priority: Medium     Physical deconditioning 08/10/2016     Priority: Medium     Urinary retention 08/10/2016     Priority: Medium     Community acquired pneumonia 08/10/2016     Priority: Medium     Constipation 08/10/2016     Priority: Medium     Hypoxia 07/27/2016     Priority: Medium     Atrial fibrillation (H) 06/28/2016     Priority: Medium     amiodarone therapy discontinued due to pulmonary toxicity       Health Care Home 12/10/2015     Priority: Medium     State Tier Level:  unknown  Status:  Unable to reach  Care Coordinator:  Denisse Gonzales    See Letters for [facility] Care Plan  Date:  December 10, 2015           Other abnormalities of gait and mobility 09/16/2015     Priority: Medium     Alcohol abuse 08/11/2015     Priority: Medium     Other sequelae of cerebral infarction 05/26/2015     Priority: Medium     Cerebral infarction, unspecified (H) 05/20/2015     Priority: Medium     Diagnosis updated by automated process. Provider to review and confirm.       Heteronymous bilateral field defects 05/20/2015     Priority: Medium     Acute disseminated encephalitis and encephalomyelitis, unspecified 05/12/2015     Priority: Medium     Rash and nonspecific skin eruption 12/06/2013     Priority: Medium     Vitamin B12 deficiency disease 09/10/2013     Priority: Medium     Abnormal liver function tests 09/10/2013     Priority: Medium     Collagenous colitis 09/10/2013     Priority: Medium     Redundant colon      Priority: Medium     Lymphocytic colitis 08/18/2013     Priority: Medium     IgG monoclonal gammopathy  08/18/2013     Priority: Medium     MGUS        CARDIOVASCULAR SCREENING; LDL GOAL LESS THAN 100 08/18/2013     Priority: Medium     Noninfectious gastroenteritis 08/09/2013     Priority: Medium     Vocal cord cancer (H) 07/31/2013     Priority: Medium     07/2013       Lower extremity edema 07/31/2013     Priority: Medium     Anemia 07/23/2013     Priority: Medium     MEL (obstructive sleep apnea) 07/22/2013     Priority: Medium     PVD (peripheral vascular disease) (H) 07/22/2013     Priority: Medium     Decreased diffusion capacity 07/22/2013     Priority: Medium     Encephalopathy 03/15/2005     Priority: Medium     Problem list name updated by automated process. Provider to review       Mixed hyperlipidemia 03/15/2005     Priority: Medium     Other specified disorder of skin 03/15/2005     Priority: Medium          ROS: A comprehensive ten point review of systems was negative aside from those in mentioned in the HPI.       MEDICATIONS:   Prior to Admission medications    Medication Sig Start Date End Date Taking? Authorizing Provider   acetaminophen (TYLENOL) 500 MG tablet 1,000 mg by Per G Tube route 3 times daily   Yes Reported, Patient   albuterol (PROVENTIL) (2.5 MG/3ML) 0.083% neb solution Take 2.5 mg by nebulization 3 times daily 8/31/22 9/5/22 Yes Reported, Patient   apixaban ANTICOAGULANT (ELIQUIS) 5 MG tablet Take 1 tablet (5 mg) by mouth 2 times daily  Patient taking differently: 5 mg by Per G Tube route 2 times daily 3/25/22  Yes Caterina Wright PA-C   atorvastatin (LIPITOR) 40 MG tablet Take 1 tablet (40 mg) by mouth daily  Patient taking differently: 40 mg by Per G Tube route At Bedtime 6/29/22  Yes Cherise Raymond PA-C   calcium carbonate 600 mg-vitamin D 400 units (CALTRATE) 600-400 MG-UNIT per tablet 1 tablet by Per G Tube route 2 times daily   Yes Reported, Patient   clopidogrel (PLAVIX) 75 MG tablet Take 1 tablet (75 mg) by mouth daily  Patient taking differently: 75  mg by Per G Tube route daily 11/16/21  Yes Tariq Cortes MD   diclofenac (VOLTAREN) 1 % topical gel Apply 4 g topically 4 times daily as needed for moderate pain 3/7/22  Yes Danny Paige MD   digoxin (LANOXIN) 125 MCG tablet Take 1 tablet (125 mcg) by mouth daily  Patient taking differently: 125 mcg by Per G Tube route daily 11/10/21  Yes Cherise Raymond PA-C   ferrous sulfate 220 (44 Fe) MG/5ML ELIX 220 mg by Per G Tube route daily   Yes Unknown, Entered By History   gabapentin (NEURONTIN) 300 MG capsule 300 mg by Per G Tube route every morning At 0800   Yes Unknown, Entered By History   gabapentin (NEURONTIN) 300 MG capsule Take 2 capsules (600 mg) by mouth At Bedtime  Patient taking differently: 600 mg by Per G Tube route At Bedtime 3/25/22  Yes Danny Paige MD   ipratropium - albuterol 0.5 mg/2.5 mg/3 mL (DUONEB) 0.5-2.5 (3) MG/3ML neb solution Take 1 vial by nebulization every 4 hours as needed for shortness of breath / dyspnea or wheezing   Yes Unknown, Entered By History   Lidocaine (LIDOCARE) 4 % Patch Place 1 patch onto the skin every 24 hours To prevent lidocaine toxicity, patient should be patch free for 12 hrs daily. Apply to abdomen   Yes Reported, Patient   loperamide (IMODIUM) 1 MG/5ML liquid 2 mg by Per G Tube route 4 times daily as needed for diarrhea   Yes Reported, Patient   meclizine (ANTIVERT) 25 MG tablet Take 1 tablet (25 mg) by mouth 2 times daily  Patient taking differently: 25 mg by Per G Tube route 2 times daily 2/14/22  Yes Haase, Tonya Lynn, APRN CNP   melatonin 3 MG tablet 6 mg by Per G Tube route At Bedtime   Yes Reported, Patient   mirtazapine (REMERON) 15 MG tablet Take 1 tablet (15 mg) by mouth At Bedtime  Patient taking differently: 15 mg by Per G Tube route At Bedtime 10/26/21  Yes Danny Paige MD   multivitamin (OCUVITE) TABS 1 tablet by Per G Tube route daily   Yes Unknown, Entered By History   oxyCODONE (ROXICODONE) 5 MG  tablet Take 0.5 tablets (2.5 mg) by mouth every 6 hours as needed for severe pain  Patient taking differently: 2.5 mg by Per G Tube route every 6 hours as needed for severe pain 22  Yes Patric Sutton, NP   potassium chloride ER (K-TAB/KLOR-CON) 10 MEQ CR tablet Take 20 mEq by mouth daily Give via G-tube. Do not crush medication, make a slurry.   Yes Reported, Patient   senna-docusate (SENOKOT-S/PERICOLACE) 8.6-50 MG tablet 2 tablets by Per G Tube route 2 times daily as needed for constipation   Yes Reported, Patient        ALLERGIES:   Allergies   Allergen Reactions     Sulfa Drugs Difficulty breathing, Swelling and Hives     Adhesive Tape Blisters     Amiodarone Other (See Comments)     Developed pleural effusion     Latex Unknown     Cephalosporins      Tolerated ceftriaxone      Penicillins Hives     Reaction occurred as a child  Tolerated ceftriaxone in past   Other reaction(s): Hives        SOCIAL HISTORY:  Social History     Tobacco Use     Smoking status: Former Smoker     Packs/day: 1.00     Years: 30.00     Pack years: 30.00     Types: Cigarettes     Quit date: 2013     Years since quittin.1     Smokeless tobacco: Never Used   Substance Use Topics     Alcohol use: Not Currently     Alcohol/week: 42.0 standard drinks     Types: 42 Standard drinks or equivalent per week     Drug use: No        FAMILY HISTORY:  Family History   Problem Relation Age of Onset     Blood Disease Mother         Anemia     Cardiovascular Father      Cancer - colorectal Maternal Grandfather      Hypertension Brother      Diabetes Sister 5        Juvinile Diabetes passed at 36     Respiratory Other         Lung Cancer        PHYSICAL EXAM:     BP 95/59   Pulse 117   Temp 99.1  F (37.3  C) (Oral)   Resp 22   SpO2 93%      PHYSICAL EXAM:  GENERAL:  Thin, chronically ill male  SKIN: no suspicious lesions, rashes, jaundice  HEAD: Normocephalic. Atraumatic.  NECK: Neck supple. No adenopathy.   EYES: No scleral  icterus  GASTROINTESTINAL: soft, non tender, non distended, no guarding/rebound, G-tube  JOINT/EXTREMITIES:  no gross deformities noted, normal muscle tone  NEURO: CN 2-12 grossly intact, no focal deficits  PSYCH: Normal affect        ADDITIONAL COMMENTS:   I reviewed the patient's new clinical lab test results.     Recent Labs   Lab 09/05/22 2038 08/31/22  0710   WBC 18.3* 16.1*   RBC 3.00* 2.72*   HGB 9.3* 8.6*   HCT 29.6* 27.2*   MCV 99 100   MCH 31.0 31.6   MCHC 31.4* 31.6   RDW 16.3* 16.5*   * 464*     Recent Labs   Lab Test 09/05/22 2038 08/31/22  0710 08/19/22  0505   POTASSIUM 4.5 4.1 3.8   CHLORIDE 101 100 104   CO2 26 26 27   BUN 31* 25 21   ANIONGAP 5 7 3     Recent Labs   Lab Test 09/05/22 2038 07/31/22 2050 07/25/22  0830 07/21/22 2028 07/21/22  1717 06/13/22  1439 03/10/22  1159 01/11/22  0206 09/05/18  1905 09/05/18  1628   ALBUMIN 2.0*  --  2.2*  --  2.4* 2.6*   < >  --    < > 3.2*   BILITOTAL 0.5  --   --   --  0.5 0.5   < >  --    < > 1.2   ALT 16  --   --   --  12 12   < >  --    < > 97*   AST 34  --   --   --  23 24   < >  --    < > 118*   PROTEIN  --  50 *  --  Negative  --   --   --  Negative   < >  --    LIPASE 47*  --   --   --   --   --   --   --   --  294    < > = values in this interval not displayed.       Recent Labs   Lab 09/05/22 2038   LIPASE 47*        IMAGING / ENDOSCOPY       Recent Results (from the past 24 hour(s))   XR Chest 2 Views    Narrative    EXAM: XR CHEST 2 VIEWS  LOCATION: [facility]  DATE/TIME: 9/5/2022 9:16 PM    INDICATION: Cough; hypoxemia; aspiration pneumonia  COMPARISON: 07/21/2022.      Impression    IMPRESSION: Persistent infiltrates throughout the right long, which appear slightly increased, as compared to the previous chest CT. Findings are superimposed on a background of pulmonary fibrosis.     Blunting of the right costophrenic angle appears chronic. No appreciably acute pleural effusion. No pneumothorax.    Unchanged  mild cardiomegaly. Atherosclerotic calcifications involve the thoracic aorta, as well as some of the peripheral arterial vasculature. Dense calcifications of the mitral annulus.    Vertebroplasty changes near the thoracolumbar junction.           CONSULTATION ASSESSMENT AND PLAN:    Efrem Ramos is a 77 year old with complex past medical history including coronary artery disease, valvular heart disease (severe aortic stenosis and severe tricuspid regurg), atrial fibrillation/atrial flutter on digoxin, previous stroke, monoclonal gammopathy of uncertain significance being followed by oncology, failure to thrive with chronic aspiration requiring recent G-tube placement at the end of July.  Gastroenterology was consulted given reports of persistent diarrhea.    1.  Persistent diarrhea: Multifactorial, onset of worsening diarrhea per patient is clearly tied to G-tube feeds.  Would also need to consider infectious diarrhea, microscopic colitis, less likely medication side effect, malignancy, or IBD.  -- Awaiting enteric stool panel and C. Difficile.  -- Patient was started empirically on budesonide.  -- His G-tube feeds have been stopped, the formula will be changed per dietitian to be more peptide-based.  -- Diarrhea has improved significantly with 1 bowel movement overnight and no bowel movements this morning. No evidence of overt GI bleeding.     2.  Severe malnutrition with history of dysphagia and aspiration pneumonia: Nutrition via perc G-tube, management per medicine and dietitian.    3.  Valvular heart disease /coronary artery disease /atrial fibrillation    I discussed the patient plan with Dr. Flores, GI staff physician. Thank you for asking us to participate in the care of this patient.    Approximately 45 min of total time was spent providing patient care, including patient evaluation, reviewing documentation/ test results, and .     Odette Rizzo PA-C  [facility] ( [facility])

## 2022-09-07 NOTE — PLAN OF CARE
Pt stable over night with no change in his current condition.  VSS, no c/o pain or SOB, Tele:Afib CVR.  TF infusing without problems.  Pt continues to have bouts of diarrhea.  K+ this AM is 3.3, Pt is on protocol.  Pt sleeping between cares.

## 2022-09-07 NOTE — TELEPHONE ENCOUNTER
No further questions.  Pt is being taken care of in hospital per son.  Gretta Rich, RN  [facility] RN Triage Team

## 2022-09-07 NOTE — PROGRESS NOTES
Per TAVR team patient is not a good candidate. Recommend palliative care if patient agreeable. General cardiology will sign off.     Garcia Vincent PA-C   9/7/2022  Pager: [phone]

## 2022-09-07 NOTE — PROVIDER NOTIFICATION
MD Notification    Notified Person: MD    Notified Person Name:     Notification Date/Time: 9-7-2022 1230    Notification Interaction:  Web based paging    Purpose of Notification: BP 85/50. Asymptomatic    Orders Received:    Comments:

## 2022-09-07 NOTE — PROGRESS NOTES
[facility]    Hospitalist Progress Note    Interval History   Patient awake and alert.  Continues to feel fatigued and has some baseline shortness of breath but oxygenating very well.  Diarrhea improved after reducing his tube feed rate.    Did discuss with his patient his overall poor prognosis with severe aortic stenosis and him not being a candidate for TAVR based on cardiology evaluation today.  He seemed to be pretty depressed with this news.  I had an extensive discussion lasting over an hour with his son regarding his overall medical conditions and his son is quite aware of patient's poor prognosis but would like to continue with aggressive measures for now.  Patient has always wanted to be full code and he is decisional at this time.    -Data reviewed today: I reviewed all new labs and imaging results over the last 24 hours. I personally reviewed chest x-ray that shows right greater than left infiltrates    Physical Exam   Temp: 97.6  F (36.4  C) Temp src: Oral BP: (!) 85/60 Pulse: 97   Resp: 20 SpO2: 93 % O2 Device: None (Room air)    Vitals:    09/07/22 0615   Weight: 48.4 kg (106 lb 9.6 oz)     Vital Signs with Ranges  Temp:  [97.5  F (36.4  C)-98.3  F (36.8  C)] 97.6  F (36.4  C)  Pulse:  [] 97  Resp:  [13-32] 20  BP: (75-97)/(46-63) 85/60  SpO2:  [92 %-97 %] 93 %  I/O last 3 completed shifts:  In: 260 [NG/GT:260]  Out: -     Physical Exam  Constitutional:       Comments: Appears thin weak and cachectic   HENT:      Head: Normocephalic.   Eyes:      Pupils: Pupils are equal, round, and reactive to light.   Cardiovascular:      Rate and Rhythm: Normal rate. Rhythm irregular.      Pulses: Normal pulses.      Heart sounds: Murmur heard.   Pulmonary:      Effort: Respiratory distress present.      Comments: Tachypneic with bilateral coarse crackles, right greater than left  Abdominal:      General: Abdomen is flat. Bowel sounds are normal. There is no distension.       Tenderness: There is no abdominal tenderness. There is no guarding.      Comments: Has G-tube in place.   Musculoskeletal:         General: Normal range of motion.      Cervical back: Normal range of motion.   Skin:     General: Skin is warm and dry.   Neurological:      General: No focal deficit present.   Psychiatric:         Mood and Affect: Mood normal.           Medications     dextrose       Reason anticoagulant not prescribed for atrial fibrillation       - MEDICATION INSTRUCTIONS -         acetaminophen  1,000 mg Per G Tube TID     apixaban ANTICOAGULANT  5 mg Per G Tube BID     atorvastatin  40 mg Per G Tube At Bedtime     budesonide  6 mg Oral Daily     calcium carbonate 600 mg-vitamin D 400 units  1 tablet Per G Tube BID     clopidogrel  75 mg Per G Tube Daily     digoxin  125 mcg Per G Tube Daily     ferrous sulfate  220 mg Per G Tube Daily     gabapentin  300 mg Per G Tube QAM     gabapentin  600 mg Per G Tube At Bedtime     mirtazapine  15 mg Per G Tube At Bedtime     multivitamin  with lutein  1 capsule Per G Tube Daily     sodium chloride (PF)  3 mL Intracatheter Q8H       Data   Recent Labs   Lab 22  1150 22  0601 22  2038   WBC  --  11.3* 18.3*   HGB  --  8.3* 9.3*   MCV  --  100 99   PLT  --  490* 602*   NA  --  135 132*   POTASSIUM 3.7 3.3* 4.5   CHLORIDE  --  106 101   CO2  --  25 26   BUN  --  28 31*   CR  --  0.62* 0.54*   ANIONGAP  --  4 5   ULISSES  --  8.2* 8.4*   GLC  --  106* 101*   ALBUMIN  --  1.8* 2.0*   PROTTOTAL  --  7.1 8.4   BILITOTAL  --  0.5 0.5   ALKPHOS  --  93 110   ALT  --  14 16   AST  --  24 34   LIPASE  --   --  47*       Recent Results (from the past 24 hour(s))   Echocardiogram Limited   Result Value    LVEF  60-65%    Narrative    [id number]  [id number]  [id number]  [id number]^SHENG^ABBY     [facility]  Echocardiography Laboratory  [address] [place] [address]     Name: PHANI AVITIA  MRN: [id number]  : 1945  Study  Date: 09/06/2022 12:57 PM  Age: 77 yrs  Gender: Male  Patient Location: [facility]  Reason For Study: Aortic Valve Disorder  Ordering Physician: ABBY PATRICIO  Referring Physician: Danny Paige  Performed By: Marya Magallanes     BSA: 1.6 m2  Height: 67 in  Weight: 116 lb  HR: 107  BP: 83/60 mmHg  ______________________________________________________________________________  Procedure  Limited Portable Echo Adult. Optison (NDC #[id number]) given intravenously.  ______________________________________________________________________________  Interpretation Summary     Left ventricular systolic function is normal. The visual ejection fraction is  60-65%. Flattened septum is consistent with RV pressure/volume overload.  Right ventricle is moderate to severely dilated. The right ventricular  systolic function is normal.  Severe aortic stenosis (low flow low gradient), Vmax 3.3 m/s, mean gradient 25  mmHg, SEBASTIAN 0.69cm2, DI 0.2. SVi 22 cc/m2. There is mild (1+) aortic  regurgitation.  Mild to moderate (1-2+) mitral regurgitation.  Severe (4+) tricuspid regurgitation.  Pulmonary hypertension present. The right ventricular systolic pressure is  approximated at 42mmHg plus the right atrial pressure.  IVC diameter and respiratory changes fall into an intermediate range  suggesting an RA pressure of 8 mmHg.  Trivial pericardial effusion.     This study was compared to a TTE from 7/22/2022. There has been no significant  change.  ______________________________________________________________________________  Left Ventricle  The left ventricle is normal in size. There is normal left ventricular wall  thickness. Left ventricular systolic function is normal. The visual ejection  fraction is 60-65%. Flattened septum is consistent with RV pressure/volume  overload.     Right Ventricle  The right ventricle is moderate to severely dilated. The right ventricular  systolic function is normal.     Atria  The left atrium is mild to  moderately dilated. The right atrium is severely  dilated.     Mitral Valve  There is moderate to severe mitral annular calcification. There is mild to  moderate (1-2+) mitral regurgitation.     Tricuspid Valve  There is severe (4+) tricuspid regurgitation. The right ventricular systolic  pressure is approximated at 42mmHg plus the right atrial pressure. Pulmonary  hypertension.     Aortic Valve  There is mild (1+) aortic regurgitation. Severe aortic stenosis (low flow low  gradient), Vmax 3.3 m/s, mean gradient 25 mmHg, SEBASTIAN 0.69cm2, DI 0.2. SVi 22  cc/m2.     Vessels  The aortic root is normal size. Normal size ascending aorta. IVC diameter and  respiratory changes fall into an intermediate range suggesting an RA pressure  of 8 mmHg.     Pericardium  Trivial pericardial effusion.     ______________________________________________________________________________  MMode/2D Measurements & Calculations  IVSd: 1.0 cm  LVIDd: 4.0 cm  LVPWd: 0.94 cm  LV mass(C)d: 122.3 grams  LV mass(C)dI: 76.3 grams/m2  Ao root diam: 3.5 cm  LA dimension: 4.3 cm  asc Aorta Diam: 3.5 cm  LA/Ao: 1.2  LVOT diam: 2.1 cm  LVOT area: 3.5 cm2  RWT: 0.47     Doppler Measurements & Calculations  MV max P.2 mmHg  MV mean P.3 mmHg  MV V2 VTI: 26.8 cm  MVA(VTI): 1.3 cm2  Ao V2 max: 330.8 cm/sec  Ao max P.0 mmHg  Ao V2 mean: 231.5 cm/sec  Ao mean P.0 mmHg  Ao V2 VTI: 54.5 cm  SEBASTIAN(I,D): 0.65 cm2  SEBASTIAN(V,D): 0.69 cm2  LV V1 max P.7 mmHg  LV V1 max: 65.8 cm/sec  LV V1 VTI: 10.2 cm  SV(LVOT): 35.3 ml  SI(LVOT): 22.0 ml/m2  TR max lex: 315.3 cm/sec  TR max P.5 mmHg  AV Lex Ratio (DI): 0.20  SEBASTIAN Index (cm2/m2): 0.40     ______________________________________________________________________________  Report approved by: Funmilayo Wing 2022 02:01 PM               Assessment & Plan   Efrem Ramos is a 77 year old male who was admitted on 2022.  Efrem Ramos is a 77 year old male admitted on 2022. He presents to the  emergency department with ongoing diarrhea and worsening cough and is found to have atrial fibrillation with rapid ventricular rate, possible aspiration pneumonia with right-sided infiltrate in the setting of chronic dysphagia with recent placement of feeding tube.     Dyspnea: Likely multifactorial with chronic aspiration, pulmonary fibrosis associated with chronic aspiration and prior amiodarone toxicity, and acute on chronic valvular heart failure with severe tricuspid regurgitation and severe aortic stenosis likely exacerbated by atrial fibrillation with rapid ventricular rate.  -Cardiology consulted given my concern that there is a significant component of valvular heart failure and intolerance of A. fib RVR resulting in patient's dyspnea.  Therapeutic on digoxin, previously failed amiodarone in the setting of pulmonary complications.  Suspect that metoprolol use may be limited with chronic hypotension as well (Baseline systolic BPs appear to be in the 80s-100 range)  -BNP significantly elevated.  Cardiology evaluated patient and recommended medical management for his severe aortic stenosis since he is not a candidate for TAVR due to his various underlying medical problems.  -As below, holding anti-infectives with undetectable procalcitonin  -Cardiac telemetry-shows atrial fibrillation with moderate rate control at this time.  -Continue prior to admission digoxin  -As needed low-dose IV metoprolol for A. fib RVR.  Hold for systolic blood pressure less than 90  -Received 500 mL saline bolus in the emergency department.  Would be cautious with additional fluid given concern for valvular heart failure, though generally patient appears mildly hypovolemic to euvolemic.  Patient had borderline low blood pressures today but he is asymptomatic from this.  We will continue to monitor for now and consider addition of 100 cc fluid bolus if his blood pressure does not improve by this afternoon.  -Repeat CT chest without  contrast to evaluate for pneumonia versus pneumonitis versus fluid overload versus underlying fibrosis.     Chronic aspiration: Had a percutaneous G-tube placed via surgical team at the end of July for chronic aspiration and failure to thrive; IR was not an option as colon was overlying stomach.  Failure to thrive in an adult with severe protein calorie malnutrition:  -Nutrition consulted for resumption of tube feedings; may need to consider change in formulation given ongoing diarrhea despite as needed Imodium at care facility.  Was on Jevity 1.5 in July, uncertain what tube feeding formulation he was switched to at care facility.  -N.p.o.; meds and nutrition through feeding tube.  Tube feeding flushes per protocol  -Physical therapy consulted  -Could consider IR consultation to exchange G-tube for GJ tube, though jejunal feedings are more commonly associated with dumping syndrome, and patient already with persistent diarrhea after initiation on tube feeds.     Aspiration pneumonia, possible: Possible increased infiltrates on the right on x-ray, though does not clearly have hypoxia with oxygen saturations as high as 99% while at rest on room air in the emergency department.  Occasionally with poor waveform resulting in low reading, though patient does become quite dyspneic with minimal activity and increased heart rate.  -Received cefepime and azithromycin in the emergency department.  Procalcitonin added on and undetectable.  Discontinuing additional anti-infectives  -Add on Legionella urine antigen with mild hyponatremia and ongoing diarrhea.  Generally lower suspicion of this, though CT from 7/21/2022 was suggestive of atypical multifocal pneumonia. Treatment at last admission was with ceftriaxone and doxycycline, later clindamycin completed 8/12/2022.  He did receive azithromycin at last ER encounter as well as tonight in the emergency department, though I do not believe he ever completed a  course.     Persistent diarrhea: Patient reports this has been an ongoing issue since G-tube placement.  He expresses an interest in colostomy so that he might avoid ongoing episodes of diarrhea and need for pericares.  Discussed with patient that a colostomy in this instance would be for quality of life, and an elective procedure.  With his valvular heart failure and tenuous respiratory status with chronic aspiration, would be a poor surgical candidate for this, and focus should be on attempting to adjust to pain formula to treat likely diarrhea associated with tube feeds versus determine underlying etiology of diarrhea and provide treatment.  Note that in 2005, patient had a cecal biopsy with pathology consistent with collagenous colitis.  -Nutrition consulted for resumption of tube feeds; consider addition of fiber to feed regimen given persistent diarrhea  -Intake and output, daily weights; will need to match output  -C. difficile, enteric panel negative.  -Potassium, magnesium replacement protocols in place  -Loperamide increased to 4 mg.  Added Neutrisource fiber 1 packet via G-tube twice a day to help with diarrhea  -[facility] consulted -recommended adding budesonide and fiber to his diet and if he has no improvement with diarrhea then will consider sigmoidoscopy or colonoscopy.  For now his diarrhea seems to have improved with reduction in the dose of tube feeds.  We will continue to monitor for diarrhea in the next 24 hours and if he remains stable then can be discharged at a lower dose of tube feeds.  Nutrition services following      Stage I sacral pressure ulceration: Present on admission.  Nonblanching erythema present.  No open sores appreciated.  -Wound nurse consulted  -Frequent repositioning      Leukocytosis: White count of 18.3 with left shift, platelets elevated in the 600 range suggestive of inflammatory issue.  Noted negative procalcitonin, however.  Note that patient has had a persistent  "leukocytosis since the end of July  -C. difficile and enteric panel negative  -CRP chronically elevated.  Leukocytosis improving while off antibiotics.  We will continue to monitor.     Coronary artery disease: History of extensive proximal to distal RCA stenting.  Patent on last angiogram in May 2020.  History of CVA  -Continue prior to admission apixaban anticoagulation  -Continue prior to admission Plavix  -Continue prior to admission atorvastatin      MGUS: February 2022 bone marrow biopsy with hypercellular marrow including 7% plasma cells. Followed w/ [id number] SPEP through oncology service.  -continue with outpatient follow up           Diet:   N.p.o.; nutrition consulted for resumption of tube feeds  DVT Prophylaxis: DOAC  Bravo Catheter: Not present  Central Lines: None  Cardiac Monitoring: None  Code Status:   Full code.  Discussed and confirmed with patient on admission.        Clinically Significant Risk Factors Present on Admission            # Hyponatremia: Na = 132 mmol/L (Ref range: 133 - 144 mmol/L) on admission, will monitor as appropriate   -resolved  # Hypoalbuminemia: Albumin = 2.0 g/dL (Ref range: 3.4 - 5.0 g/dL) on admission, will monitor as appropriate   # Coagulation Defect: home medication list includes an anticoagulant medication  # Platelet Defect: home medication list includes an antiplatelet medication              Disposition Plan      anticipate 9/8/2022 back to TCU      Clinically Significant Risk Factors Present on Admission               # Cachexia: Estimated body mass index is 16.7 kg/m  as calculated from the following:    Height as of 8/17/22: 1.702 m (5' 7\").    Weight as of this encounter: 48.4 kg (106 lb 9.6 oz).    # Severe Malnutrition: based on nutrition assessment        Courtney Chirinos MD, MD  [phone](p)  [phone]( c)  "

## 2022-09-07 NOTE — PLAN OF CARE
Goal Outcome Evaluation: Pt is A&Ox4, denies pain, VSS and on tele A-Fib CVR/RVR, on RA and LS diminished, FT at 30 ml/hr - increase rate by 15 ml in 8 hrs (0230) to 45 ml/hr, free water flushes 130 ml every 4 hrs, coccyx blanchable red and foam on, had x 3 loose stools, Imodium 4 mg given. Turned and repositioned every 2 hrs.     Plan of Care Reviewed With: patient, son     Overall Patient Progress: no change

## 2022-09-07 NOTE — CONSULTS
Care Management Initial Consult    General Information  Assessment completed with: Patient, Patient  Type of CM/SW Visit: Initial Assessment    Primary Care Provider verified and updated as needed: Yes   Readmission within the last 30 days:        Reason for Consult: discharge planning  Advance Care Planning: Advance Care Planning Reviewed: other (see comments) (no acp documents on file)          Communication Assessment  Patient's communication style: spoken language (English or Bilingual)    Hearing Difficulty or Deaf: no   Wear Glasses or Blind: yes    Cognitive  Cognitive/Neuro/Behavioral: WDL                      Living Environment:   People in home: child(daron, adult  son Efrem  Current living Arrangements: house      Able to return to prior arrangements: other (see comments) (After TCU)       Family/Social Support:  Care provided by: self, child(олег)  Provides care for: no one  Marital Status:   Children          Description of Support System: Supportive, Involved    Support Assessment: Adequate family and caregiver support, Adequate social supports    Current Resources:   Patient receiving home care services:       Community Resources:    Equipment currently used at home: walker, rolling, walker, standard, wheelchair, manual  Supplies currently used at home:      Employment/Financial:  Employment Status: retired        Financial Concerns: No concerns identified           Lifestyle & Psychosocial Needs:  Social Determinants of Health     Tobacco Use: Medium Risk     Smoking Tobacco Use: Former Smoker     Smokeless Tobacco Use: Never Used   Alcohol Use: Not on file   Financial Resource Strain: Not on file   Food Insecurity: Not on file   Transportation Needs: Not on file   Physical Activity: Not on file   Stress: Not on file   Social Connections: Not on file   Intimate Partner Violence: Not on file   Depression: Not at risk     PHQ-2 Score: 0   Housing Stability: Not on file       Functional  Status:  Prior to admission patient needed assistance:              Mental Health Status:          Chemical Dependency Status:                Values/Beliefs:  Spiritual, Cultural Beliefs, [employer] Practices, Values that affect care: no               Additional Information:  Per care management consult for discharge planning. Patient admitted on 9/5/22 from [facility] with ongoing diarrhea and worsening cough and is found to have atrial fibrillation with rapid ventricular rate, possible aspiration pneumonia with right-sided infiltrate in the setting of chronic dysphagia with recent placement of feeding tube.   Date of discharge is TDB. Writer reviewed chart and met with patient. Patient lives in a multi-level home with his son Efrem who helps with shopping, laundry, and reports he mainly works from home. Writer reviewed recommendation for TCU at discharge - patient is in agreement. Referrals sent to [facility], [facility], and [facility] via [facility] for bed availability. Patient notes he would prefer [facility] if possible as he has been there in the past.     Will continue to follow.    DAVIE Calles, LGSW    [facility]

## 2022-09-08 NOTE — PROGRESS NOTES
Pt was seen previous during a different admission. Admin team from [facility] as me to see this patient.  This writer spoke to pt at bedside if he had any questions/interest in hospice at this time.  Pt declined an information visit or interest in hospice at this time.      Nereyda Travis RN  Clinical Hospice Nurse Liaison  Contact Cell [phone]

## 2022-09-08 NOTE — PROGRESS NOTES
[facility]    Hospitalist Progress Note    Interval History   Patient awake and alert.  Had 3 loose stools in the last 24 hours with the last 1 being pretty explosive.  Continues to feel very fatigued and dispirited with his overall medical condition.  Quite disappointed that he is not a candidate for TAVR surgery.  Spoke extensively about his poor prognosis.    At this time patient would like to continue with aggressive cares despite knowing about his poor prognosis.  Refused hospice visit.    Data reviewetoday: I reviewed all new labs and imaging results over the last 24 hours. I personally reviewed chest x-ray that shows right greater than left infiltrates    Physical Exam   Temp: 97.6  F (36.4  C) Temp src: Oral BP: 92/59 Pulse: 94   Resp: 18 SpO2: 91 % O2 Device: None (Room air)    Vitals:    09/07/22 0615 09/08/22 0356   Weight: 48.4 kg (106 lb 9.6 oz) 47.9 kg (105 lb 9.6 oz)     Vital Signs with Ranges  Temp:  [97.4  F (36.3  C)-97.9  F (36.6  C)] 97.6  F (36.4  C)  Pulse:  [81-98] 94  Resp:  [16-24] 18  BP: (82-97)/(57-66) 92/59  SpO2:  [91 %-97 %] 91 %  I/O last 3 completed shifts:  In: 1888 [P.O.:120; I.V.:3; NG/GT:520]  Out: 200 [Urine:200]    Physical Exam  Constitutional:       Comments: Appears thin weak and cachectic   HENT:      Head: Normocephalic.   Eyes:      Pupils: Pupils are equal, round, and reactive to light.   Cardiovascular:      Rate and Rhythm: Normal rate. Rhythm irregular.      Pulses: Normal pulses.      Heart sounds: Murmur heard.   Pulmonary:      Effort: Respiratory distress present.      Comments: Tachypneic with bilateral coarse crackles, right greater than left  Abdominal:      General: Abdomen is flat. Bowel sounds are normal. There is no distension.      Tenderness: There is no abdominal tenderness. There is no guarding.      Comments: Has G-tube in place.   Musculoskeletal:         General: Normal range of motion.      Cervical back: Normal range of  motion.   Skin:     General: Skin is warm and dry.   Neurological:      General: No focal deficit present.   Psychiatric:         Mood and Affect: Mood normal.           Medications     dextrose       Reason anticoagulant not prescribed for atrial fibrillation       - MEDICATION INSTRUCTIONS -         acetaminophen  1,000 mg Per G Tube TID     apixaban ANTICOAGULANT  5 mg Per G Tube BID     atorvastatin  40 mg Per G Tube At Bedtime     banatrol plus  1 packet Per Feeding Tube TID     calcium carbonate 600 mg-vitamin D 400 units  1 tablet Per G Tube BID     clopidogrel  75 mg Per G Tube Daily     digoxin  125 mcg Per G Tube Daily     ferrous sulfate  220 mg Per G Tube Daily     fiber modular (NUTRISOURCE FIBER)  1 packet Per Feeding Tube Daily     gabapentin  300 mg Per G Tube QAM     gabapentin  600 mg Per G Tube At Bedtime     mirtazapine  15 mg Per G Tube At Bedtime     multivitamin  with lutein  1 capsule Per G Tube Daily     sodium chloride (PF)  3 mL Intracatheter Q8H       Data   Recent Labs   Lab 09/08/22  0540 09/07/22  1150 09/07/22  0601 09/05/22 2038   WBC  --   --  11.3* 18.3*   HGB  --   --  8.3* 9.3*   MCV  --   --  100 99   PLT  --   --  490* 602*     --  135 132*   POTASSIUM 3.6 3.7 3.3* 4.5   CHLORIDE 104  --  106 101   CO2 25  --  25 26   BUN 26  --  28 31*   CR 0.60*  --  0.62* 0.54*   ANIONGAP 4  --  4 5   ULISSES 8.1*  --  8.2* 8.4*   *  --  106* 101*   ALBUMIN  --   --  1.8* 2.0*   PROTTOTAL  --   --  7.1 8.4   BILITOTAL  --   --  0.5 0.5   ALKPHOS  --   --  93 110   ALT  --   --  14 16   AST  --   --  24 34   LIPASE  --   --   --  47*       Recent Results (from the past 24 hour(s))   CT Chest Hi-Resolution wo Contrast    Narrative    CT CHEST HI-RESOLUTION WITHOUT CONTRAST 9/7/2022 2:22 PM    CLINICAL HISTORY: Pneumonia versus heart failure versus fibrosis.    TECHNIQUE: High resolution images were obtained through the chest  during inspiration with select expiratory views. Prone  imaging was  performed. Multiplanar reformats were obtained. Dose reduction  techniques were used.    CONTRAST: None.    COMPARISON: 8/2/2022    FINDINGS:   LUNGS AND PLEURA: There is pulmonary fibrosis with peribronchial and  peripheral reticulation, traction bronchiectasis, and some  honeycombing in the right lung base. There are superimposed  consolidative opacities in both lower lobes, right greater than left,  and in the right upper lobe. A small right pleural effusion has  increased minimally. No significant air trapping on expiration images.    MEDIASTINUM/AXILLAE: Multiple mildly enlarged mediastinal lymph nodes  are similar to previous and most likely reactive. Severe coronary  artery calcification.    UPPER ABDOMEN: No significant finding.    MUSCULOSKELETAL: Unremarkable.      Impression    IMPRESSION:   New patchy consolidative opacities in both lungs, right greater than  left. This most likely represents pneumonia superimposed on the  patient's background fibrosis.    MIKA MENDOSA MD         SYSTEM ID:  [id number]         Assessment & Plan   Efrem Ramos is a 77 year old male who was admitted on 9/5/2022.  Efrem Ramos is a 77 year old male admitted on 9/5/2022. He presents to the emergency department with ongoing diarrhea and worsening cough and is found to have atrial fibrillation with rapid ventricular rate, possible aspiration pneumonia with right-sided infiltrate in the setting of chronic dysphagia with recent placement of feeding tube.     Dyspnea: Likely multifactorial with chronic aspiration, pulmonary fibrosis associated with chronic aspiration and prior amiodarone toxicity, and acute on chronic valvular heart failure with severe tricuspid regurgitation and severe aortic stenosis likely exacerbated by atrial fibrillation with rapid ventricular rate.  -Cardiology consulted given my concern that there is a significant component of valvular heart failure and intolerance of A. fib RVR  resulting in patient's dyspnea.  Therapeutic on digoxin, previously failed amiodarone in the setting of pulmonary complications.  Suspect that metoprolol use may be limited with chronic hypotension as well (Baseline systolic BPs appear to be in the 80s-100 range)  -BNP significantly elevated.  Cardiology evaluated patient and recommended medical management for his severe aortic stenosis since he is not a candidate for TAVR due to his various underlying medical problems.  They also recommended palliative and hospice consult due to his progressive end-stage aortic stenosis.  Patient refused hospice visit and would like to proceed with aggressive measures at this time.  -As below, holding anti-infectives with undetectable procalcitonin  -Cardiac telemetry-shows atrial fibrillation with moderate rate control at this time.  -Continue prior to admission digoxin  -As needed low-dose IV metoprolol for A. fib RVR.  Hold for systolic blood pressure less than 90  -Received 500 mL saline bolus in the emergency department.  Would be cautious with additional fluid given concern for valvular heart failure, though generally patient appears mildly hypovolemic to euvolemic.  Patient had borderline low blood pressures today but he is asymptomatic from this.  We will continue to monitor for now and consider addition of 100 cc fluid bolus if his blood pressure does not improve by this afternoon.  -Repeat CT chest without contrast to evaluate for pneumonia versus pneumonitis versus fluid overload versus underlying fibrosis.     Chronic aspiration with aspiration pneumonia: Had a percutaneous G-tube placed via surgical team at the end of July for chronic aspiration and failure to thrive; IR was not an option as colon was overlying stomach.  Failure to thrive in an adult with severe protein calorie malnutrition:  -Nutrition consulted for resumption of tube feedings; may need to consider change in formulation given ongoing diarrhea despite  as needed Imodium at care facility.  Was on Jevity 1.5 in July, uncertain what tube feeding formulation he was switched to at care facility.  -N.p.o.; meds and nutrition through feeding tube.  Tube feeding flushes per protocol  -Physical therapy consulted  -Could consider IR consultation to exchange G-tube for GJ tube, though jejunal feedings are more commonly associated with dumping syndrome, and patient already with persistent diarrhea after initiation on tube feeds.     Aspiration pneumonia, : Chest x-ray showed increasing right-sided infiltrates.  -Received cefepime and azithromycin in the emergency department.  Procalcitonin added on and undetectable.  Discontinuing additional anti-infectives  -CT chest without contrast done yesterday did show worsening infiltrates on the right side compared to last left concerning for aspiration pneumonia.  Started on ceftriaxone and Flagyl for a 5-day course to cover for aspiration.  He tolerated ceftriaxone in the past despite penicillin allergies.     Persistent diarrhea: Patient reports this has been an ongoing issue since G-tube placement.  He expresses an interest in colostomy so that he might avoid ongoing episodes of diarrhea and need for pericares.  Discussed with patient that a colostomy in this instance would be for quality of life, and an elective procedure.  With his valvular heart failure and tenuous respiratory status with chronic aspiration, would be a poor surgical candidate for this, and focus should be on attempting to adjust to pain formula to treat likely diarrhea associated with tube feeds versus determine underlying etiology of diarrhea and provide treatment.  Note that in 2005, patient had a cecal biopsy with pathology consistent with collagenous colitis.  -Nutrition consulted for resumption of tube feeds; consider addition of fiber to feed regimen given persistent diarrhea  -Intake and output, daily weights; will need to match output  -C. difficile,  enteric panel negative.  -Potassium, magnesium replacement protocols in place  -Loperamide increased to 4 mg.  Added Neutrisource fiber 1 packet via G-tube twice a day to help with diarrhea  -[facility] consulted -recommended adding budesonide and fiber to his diet and if he has no improvement with diarrhea then will consider sigmoidoscopy or colonoscopy.  Unfortunately budesonide is enteric-coated and cannot be given through his G-tube.  For now diarrhea seems to be improved with reduce the rate of tube feeds and adding fiber and probiotic.  Continue to monitor.     Stage I sacral pressure ulceration: Present on admission.  Nonblanching erythema present.  No open sores appreciated.  -Wound nurse consulted  -Frequent repositioning      Leukocytosis:  Improving.  Started on antibiotics for aspiration pneumonia.  GI pathogen panel including C. difficile have been negative so far.     Coronary artery disease: History of extensive proximal to distal RCA stenting.  Patent on last angiogram in May 2020.  History of CVA  -Continue prior to admission apixaban anticoagulation  -Continue prior to admission Plavix  -Continue prior to admission atorvastatin      MGUS: February 2022 bone marrow biopsy with hypercellular marrow including 7% plasma cells. Followed w/ [id number] SPEP through oncology service.  -continue with outpatient follow up           Diet:   N.p.o.; nutrition consulted for resumption of tube feeds at 45 cc an hour.  DVT Prophylaxis: DOAC  Bravo Catheter: Not present  Central Lines: None  Cardiac Monitoring: None  Code Status:   Full code.  Discussed and confirmed with patient on admission.        Clinically Significant Risk Factors Present on Admission            # Hyponatremia: Na = 132 mmol/L (Ref range: 133 - 144 mmol/L) on admission, will monitor as appropriate   -resolved  # Hypoalbuminemia: Albumin = 2.0 g/dL (Ref range: 3.4 - 5.0 g/dL) on admission, will monitor as appropriate   # Coagulation Defect: home  "medication list includes an anticoagulant medication  # Platelet Defect: home medication list includes an antiplatelet medication              Disposition Plan      anticipate 9/8/2022 back to TCU      Clinically Significant Risk Factors Present on Admission               # Cachexia: Estimated body mass index is 16.54 kg/m  as calculated from the following:    Height as of 8/17/22: 1.702 m (5' 7\").    Weight as of this encounter: 47.9 kg (105 lb 9.6 oz).    # Severe Malnutrition: based on nutrition assessment        Courtney Chirinos MD, MD  [phone](p)  [phone]( c)  "

## 2022-09-08 NOTE — PLAN OF CARE
Goal Outcome Evaluation:    Plan of Care Reviewed With: patient     Overall Patient Progress: improving    Outcome Evaluation: Seems to be tolerating current semi-elemental formula with reduced stooling. Add Banantrol (prebiotic) for diarrhea.

## 2022-09-08 NOTE — PROGRESS NOTES
"CLINICAL NUTRITION SERVICES - REASSESSMENT NOTE    Recommendations Ordered by Registered Dietitian (RD):   - Continue Vital 1.5 @ 45 (semi-elemental)  - Add 1 pkt Banatrol TID for additional 6 g fiber - Prebiotic for diarrhea    Future/Additional Recommendations:   - Consider cycling TF overnight. Pt not sure he could tolerate a rate of 90 mL/hr x 12 hours, but may be willing to try a 16 hr cycle (68 mL/hr x 16 hrs)    Malnutrition: (9/8)   % Weight Loss:  > 10% in 6 months (severe malnutrition)  % Intake:  </= 75% for >/= 1 month (severe malnutrition) --?malabsorption in setting of chronic diarrhea  Subcutaneous Fat Loss:  Orbital region moderate depletion and Upper arm region moderate depletion  Muscle Loss:  Temporal region moderate-severe depletion, Clavicle bone region moderate depletion, Dorsal hand region moderate depletion, Patellar region moderate-severe depletion, Anterior thigh region moderate depletion and Posterior calf region moderate-severe depletion  Fluid Retention:  None noted     Malnutrition Diagnosis: Severe malnutrition  In Context of:  Acute illness or injury  Chronic illness or disease     EVALUATION OF PROGRESS TOWARD GOALS   Diet: NPO    Nutrition Support: TF as follows -     Nutrition Support Enteral:  Type of Feeding Tube: Gastrostomy   Enteral Frequency:  Continuous  Enteral Regimen: Vital 1.5 @ 45 mL/hr   Total Enteral Provisions: 1620 kcals (33.8 kcal/kg), 72 g PRO (1.5 g/kg), 825 ml free H20, 201 g CHO, and 6 g fiber daily.  Free Water Flush: 130 mL q 4 hours (total free water = 1605 mL /day)    - MD added 1 pkt Nutrasource fiber daily (3g fiber) for a grand total of 9 g fiber daily.     Intake/Tolerance:   - Pt thinks his stooling might be better with the reduced volume, but admits \"I'm not the one to ask!\". He was concerned that he might not be getting all his nutrition - I assured him he is.   - Asked if he might consider cycling overnight, he did not feel ready for that yet.   - " Pt did state he has tried banatrol in the past will little effect. Open to trying again in combination with a different (vital 1.5) formula.    - TF reached goal on 9/7 am.   - Stooling: BM x3 on 9/8, x2 on 9/7, x3 on 9/6  - Labs: Reviewed. K/Mg/Phos NL. BGs acceptable   - Weight 47.9 kg on 9/8.     Wt Readings from Last 5 Encounters:   09/08/22 47.9 kg (105 lb 9.6 oz)   09/05/22 52.6 kg (116 lb)   08/30/22 54 kg (119 lb 1.6 oz)   08/24/22 53.8 kg (118 lb 9.6 oz)   08/22/22 57.2 kg (126 lb 1.6 oz)       ASSESSED NUTRITION NEEDS:  Dosing Weight 47.9 kg - lowest of admission    Estimated Energy Needs: 5634-5360 kcals (30-35 Kcal/Kg)  Justification: repletion and underweight  Estimated Protein Needs: 58-96 grams protein (1.2-2 g pro/Kg)  Justification: maintenance  Estimated Fluid Needs: 1 mL/kcal  Justification: maintenance    NEW FINDINGS:   - Cards: not a good candidate for TAVR.  - GI: note total # of BMs decreasing.     Previous Goals:   TF to reach goal in the next 48 hours.   Evaluation: Met  No more than 3 BM daily.   Evaluation: Met - per I/O flowsheet     Previous Nutrition Diagnosis:   Altered GI function related to poor TF tolerance as evidenced by persistent diarrhea for the past month or so since starting GT feedings  Evaluation: Improving, ongoing     CURRENT NUTRITION DIAGNOSIS  Altered GI function related to poor TF tolerance in setting of longstanding diarrhea as evidenced by pt reported chronic issues with diarrhea, exacerbated by GT placement for tube feeds.     INTERVENTIONS  Recommendations / Nutrition Prescription  Continue TF as ordered  Add Banatrol TID     Implementation  EN Composition - as above    Goals  TF @ goal to provide % estimated needs.   No more than 3 BMs daily     MONITORING AND EVALUATION:  Progress towards goals will be monitored and evaluated per protocol and Practice Guidelines    Manjula Lau RD, LD  [facility] [place], [facility], [facility], [facility]  Pager: [phone]  Weekend  Pager: [phone]

## 2022-09-08 NOTE — PLAN OF CARE
Goal Outcome Evaluation:  Pt is A&Ox4, denies pain, VSS and on tele A-Fib CVR, on RA and LS diminished with crackles at bases, FT at 45 ml/hr - free water flushes 130 ml every 4 hrs, coccyx blanchable red and foam on, had 1 loose stool and Imodium 4 mg given. Turned and repositioned every 2 hrs.     Plan of Care Reviewed With: patient, son     Overall Patient Progress: no change

## 2022-09-08 NOTE — PROGRESS NOTES
Shift unremarkable.  Pt remains A&Ox4 this shift, no complaints of pain.  Pt continues with pulsate mattress and q2h side to side repositioning.  Pt continues with q2h and PRN incontinence checks as well.  Incontinent of bowel and bladder this shift.  Continues with blanchable redness to coccyx, dressing changed x1 this shift related to loose stooling compromising dressing integrity.  VSS, low blood pressures stable with MAP >65, asx.  Remains on room air for the duration of the shift.  Pt continues on magnesium and potassium protocols, goals 1.5 and 3.4 respectively.  AM labs show magnesium of 2.1 and potassium at 3.6; no need for replacement per protocol this shift.  Orders placed for 9/9 AM check of magnesium and potassium.

## 2022-09-08 NOTE — PROGRESS NOTES
Date: September 8, 2022  Shift: [id number]  Name: Efrem Ramos  Age: 77 year old  YOB: 1945  Date of Admission: 09/02/2022    Reason for Admission: Chronic hypotension [I95.89]  Chronic diarrhea [K52.9]  Chronic atrial fibrillation (H) [I48.20]  Atrial fibrillation with rapid ventricular response (H) [I48.91]  Elevated lactic acid level [R79.89]  Aspiration pneumonia of right lower lobe, unspecified aspiration pneumonia type (H) [J69.0]     Cognitive/Mentation: A/Ox4. Calm/cooperative but quiet, sad/depressed, and withdrawn with cares at times. Pt upset/dissapointed about prognosis, see MD note.   Neuros/CMS: Sensation intact, pulses palpable    VS: VSS on RA; hypotensive at baseline. Goal MAP >60  Cardiac: Murmur heard, irregular  GI: Loose stools, incontinent  : Incontinent  Pulmonary: LS diminished; patient had several productive coughs today with result - LS improving  Pain: Pt complained of headache mid-day, scheduled tylenol given with relief     Lines/Drains: PIV-SL; G-tube w/ TF running @ 45mL/hr with 130 mL flushes Q4  Skin: Blanchable redness on coccyx  Activity: T/R, pt not OOB. Able to turn self in bed assist of 1, pillows placed per staff - keeping pt off coccyx due to pain/redness    Diet: Strict NPO - TF running     Therapies recs: PT; Nutrition  Discharge: Pending TCU     Shift summary: Sticky note left in summary - pt's son wants to be contacted by someone from nutrition to talk about the tube feeding.

## 2022-09-09 NOTE — PROGRESS NOTES
Care Management Follow Up    Length of Stay (days): 4    Expected Discharge Date: 09/10/2022     Concerns to be Addressed:       Patient plan of care discussed at interdisciplinary rounds: Yes    Anticipated Discharge Disposition: Transitional Care     Anticipated Discharge Services: Transportation Services  Anticipated Discharge DME:      Patient/family educated on Medicare website which has current facility and service quality ratings:    Education Provided on the Discharge Plan:    Patient/Family in Agreement with the Plan: yes    Referrals Placed by CM/SW:    Private pay costs discussed:    Additional Information:  Received additional TCU choices from patient's son Efrem:    Emma/Shahzad Chris  [facility]  Eladio LANGSTON to enter referrals via [employer] in [facility].    Nasra De La Cruz RN  Care Coordinator  [facility]  [phone] (text or call)

## 2022-09-09 NOTE — PROGRESS NOTES
Gastroenterology Follow Up Note    Efrem Ramos MRN#  [id number]   Age:  77 year old YOB: 1945    Date of Admission:  9/5/2022     Subjective   Diarrhea continues to improve.  RN notes only 1 BM since this morning.  Patient notes total of 3 BM in last 24 hours.  Continues to have some urgency with one episode of incontinence partially due to decreased mobility.    Of note, patient with depressed mood today that he attributes to receiving poor prognosis from cardiology and an earlier visit by hospice team.       MEDICATIONS & ALLERGIES   Current medication list reviewed.      Allergies   Allergen Reactions     Sulfa Drugs Difficulty breathing, Swelling and Hives     Adhesive Tape Blisters     Amiodarone Other (See Comments)     Developed pleural effusion     Latex Unknown     Cephalosporins      Tolerated ceftriaxone      Penicillins Hives     Reaction occurred as a child  Tolerated ceftriaxone in past   Other reaction(s): Hives          OBJECTIVE   Vitals BP (!) 88/55 (BP Location: Left arm)   Pulse 96   Temp 98.1  F (36.7  C) (Oral)   Resp 16   Wt 47.9 kg (105 lb 9.6 oz)   SpO2 93%   BMI 16.54 kg/m          General:   Thin frail elderly CM in NAD.   HEENT:   NC/AT. MMM.   Lungs:  No respiratory distress.   Heart:  Irregular.   Abdomen:   Soft. NT/ND. G tube in place with TF at 45 mL/hr.   Ext/MS:   No cyanosis or erythema.    Neuro:   No focal deficits noted.    Psych:  Depressed mood noted today.   Skin:  No obvious rashes or lesions noted.         LABORATORY AND IMAGING    ELECTROLYTE PANEL   Recent Labs   Lab Test 09/07/22  1150 09/07/22  0601 09/05/22 2038 08/31/22  0710   POTASSIUM 3.7 3.3* 4.5 4.1   BUN  --  28 31* 25      HEMATOLOGY PANEL   Recent Labs   Lab Test 09/07/22  0601 09/05/22 2038 08/31/22  0710 05/20/20  0415 05/18/20 2025 05/18/20  1935 05/18/20  0831 03/23/20  0847   WBC 11.3* 18.3* 16.1*   < > 21.4*   < > 11.9* 12.9*    99 100   < > 97   < > 101* 99   INR   --   --   --   --  1.51*  --  1.18* 1.07    < > = values in this interval not displayed.      LIVER AND PANCREAS PANEL   Recent Labs   Lab Test 09/07/22  0601 09/05/22 2038 07/25/22  0830 09/06/18  0602 09/05/18  1628   ALBUMIN 1.8* 2.0* 2.2*   < > 3.2*   LIPASE  --  47*  --   --  294    < > = values in this interval not displayed.      I have reviewed the current diagnostic and laboratory tests.     Assessment / Recommendations:     Assessment:  1. Chronic diarrhea.  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  Prior treatment with course of Budesonide with appreciable relief in 2018.  Symptoms well managed until acute worsening after placement of gastrostomy tube and starting enteric tube feeds in 07/2022.  Patient empirically started on Budesonide over the weekend by primary team with moderate but not complete clinical relief.  Symptoms may be secondary to recurrent microscopic colitis, medication (digoxin) induced diarrhea, irritable bowel syndrome, constipation with overflow diarrhea, or small intestinal bacterial overgrowth.  Inflammatory bowel disease or neoplastic process considered but absence of worrisome features is reassuring.  Given clinical improvement with empirical trial of Budesonide, would continue current therapy.  If symptoms fail to improve, then can consider repeat lower endoscopic evaluation with colonoscopy vs flexible sigmoidoscopy. However with recent recommendations from cardiology recommending palliative care, would need to have detailed discussion regarding goals of care prior to any endoscopic procedure.  2. 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  Placement of gastrostomy tube and initiation of enteric tube feeding in 07/2022.     Recommendations:  1. Continue and complete 8-week course of budesonide.   2. Agree with and appreciate dietician input regarding changes in tube feeds as indicated.    3. Continue fiber supplements and Banatrol with tube feeds.  4. If diarrhea persists, can consider repeat lower endoscopic evaluation with colonoscopy vs flexible sigmoidoscopy. However with recent recommendations from cardiology recommending palliative care, would need to have detailed discussion regarding goals of care prior to any endoscopic procedure.  5. With ongoing clinical improvement from GI standpoint, no further acute needs identified.  GI team to sign off.  Please feel free to contact with any questions or concerns.       Total time spent in chart review, direct medical discussion, examination, and documentation was 25 minutes.    Krishan Flores MD  [facility]  [phone]  I appreciate the opportunity to participate in the care of this patient.   Please feel free to call me with any questions or concerns.

## 2022-09-09 NOTE — PROGRESS NOTES
Pt is A&Ox4, on RA, total cares Q2 turns, incontinent of B&B.  On g-tube feedings, running at 45mL/hr w/ 130mL flush Q4hr. Strict NPO. All meds to go through g-tube. Hypotensive at baseline, goal MAP >60.  Pt has PRN imodium available for loose stools. Had 2 loose BM's during shift.     Sleeping between cares. Will continue to monitor and follow plan of care.

## 2022-09-09 NOTE — PROGRESS NOTES
[facility]    Hospitalist Progress Note    Interval History   Patient awake and alert.  Continues to have slow improvement with his diarrhea.  Continues to feel fatigued but improved since admission.  In better spirits today.    At this time patient would like to continue with aggressive cares despite knowing about his poor prognosis.  Refused hospice visit.    Data reviewetoday: I reviewed all new labs and imaging results over the last 24 hours. I personally reviewed chest x-ray that shows right greater than left infiltrates    Physical Exam   Temp: 97.6  F (36.4  C) Temp src: Oral BP: 94/65 Pulse: 90   Resp: 11 SpO2: 93 % O2 Device: None (Room air)    Vitals:    09/07/22 0615 09/08/22 0356 09/09/22 0608   Weight: 48.4 kg (106 lb 9.6 oz) 47.9 kg (105 lb 9.6 oz) 48.2 kg (106 lb 4.8 oz)     Vital Signs with Ranges  Temp:  [97.6  F (36.4  C)-98.1  F (36.7  C)] 97.6  F (36.4  C)  Pulse:  [73-96] 90  Resp:  [11-16] 11  BP: (88-94)/(55-65) 94/65  SpO2:  [92 %-93 %] 93 %  No intake/output data recorded.    Physical Exam  Constitutional:       Comments: Appears thin weak and cachectic   HENT:      Head: Normocephalic.   Eyes:      Pupils: Pupils are equal, round, and reactive to light.   Cardiovascular:      Rate and Rhythm: Tachycardia present. Rhythm irregular.      Pulses: Normal pulses.      Heart sounds: Murmur heard.   Pulmonary:      Comments: Tachypneic with bilateral coarse crackles, right greater than left  Abdominal:      General: Abdomen is flat. Bowel sounds are normal. There is no distension.      Tenderness: There is no abdominal tenderness. There is no guarding.      Comments: Has G-tube in place.   Musculoskeletal:         General: Normal range of motion.      Cervical back: Normal range of motion.   Skin:     General: Skin is warm and dry.   Neurological:      General: No focal deficit present.   Psychiatric:         Mood and Affect: Mood normal.           Medications      dextrose       Reason anticoagulant not prescribed for atrial fibrillation       - MEDICATION INSTRUCTIONS -         acetaminophen  1,000 mg Per G Tube TID     apixaban ANTICOAGULANT  5 mg Per G Tube BID     atorvastatin  40 mg Per G Tube At Bedtime     banatrol plus  1 packet Per Feeding Tube TID     budesonide  4.5 mg Per G Tube BID     calcium carbonate 600 mg-vitamin D 400 units  1 tablet Per G Tube BID     cefTRIAXone  1 g Intravenous Q24H     clopidogrel  75 mg Per G Tube Daily     digoxin  125 mcg Per G Tube Daily     ferrous sulfate  220 mg Per G Tube Daily     fiber modular (NUTRISOURCE FIBER)  1 packet Per Feeding Tube Daily     gabapentin  300 mg Per G Tube QAM     gabapentin  600 mg Per G Tube At Bedtime     metroNIDAZOLE  500 mg Intravenous Q12H     mirtazapine  15 mg Per G Tube At Bedtime     multivitamin  with lutein  1 capsule Per G Tube Daily     sodium chloride (PF)  3 mL Intracatheter Q8H       Data   Recent Labs   Lab 09/09/22  0630 09/09/22  0148 09/08/22  2115 09/08/22  1720 09/08/22  0540 09/07/22  1150 09/07/22  0601 09/05/22 2038   WBC  --   --   --   --   --   --  11.3* 18.3*   HGB  --   --   --   --   --   --  8.3* 9.3*   MCV  --   --   --   --   --   --  100 99   PLT  --   --   --   --   --   --  490* 602*   NA  --   --   --   --  133  --  135 132*   POTASSIUM 3.1*  --   --   --  3.6 3.7 3.3* 4.5   CHLORIDE  --   --   --   --  104  --  106 101   CO2  --   --   --   --  25  --  25 26   BUN  --   --   --   --  26  --  28 31*   CR  --   --   --   --  0.60*  --  0.62* 0.54*   ANIONGAP  --   --   --   --  4  --  4 5   ULISSES  --   --   --   --  8.1*  --  8.2* 8.4*   GLC  --  105* 111* 106* 131*  --  106* 101*   ALBUMIN  --   --   --   --   --   --  1.8* 2.0*   PROTTOTAL  --   --   --   --   --   --  7.1 8.4   BILITOTAL  --   --   --   --   --   --  0.5 0.5   ALKPHOS  --   --   --   --   --   --  93 110   ALT  --   --   --   --   --   --  14 16   AST  --   --   --   --   --   --  24 34   LIPASE   --   --   --   --   --   --   --  47*       No results found for this or any previous visit (from the past 24 hour(s)).      Assessment & Plan   Efrem Ramos is a 77 year old male who was admitted on 9/5/2022.  Efrem Ramos is a 77 year old male admitted on 9/5/2022. He presents to the emergency department with ongoing diarrhea and worsening cough and is found to have atrial fibrillation with rapid ventricular rate, possible aspiration pneumonia with right-sided infiltrate in the setting of chronic dysphagia with recent placement of feeding tube.     Dyspnea: Likely multifactorial with chronic aspiration, pulmonary fibrosis associated with chronic aspiration and prior amiodarone toxicity, and acute on chronic valvular heart failure with severe tricuspid regurgitation and severe aortic stenosis likely exacerbated by atrial fibrillation with rapid ventricular rate.  -Cardiology consulted given my concern that there is a significant component of valvular heart failure and intolerance of A. fib RVR resulting in patient's dyspnea.  Therapeutic on digoxin, previously failed amiodarone in the setting of pulmonary complications.  Suspect that metoprolol use may be limited with chronic hypotension as well (Baseline systolic BPs appear to be in the 80s-100 range)  -BNP significantly elevated.  Cardiology evaluated patient and recommended medical management for his severe aortic stenosis since he is not a candidate for TAVR due to his various underlying medical problems.  They also recommended palliative and hospice consult due to his progressive end-stage aortic stenosis.  Patient refused hospice visit and would like to proceed with aggressive measures at this time.  -As below, holding anti-infectives with undetectable procalcitonin  -Cardiac telemetry-shows atrial fibrillation with moderate rate control at this time.  -Continue prior to admission digoxin  -As needed low-dose IV metoprolol for A. fib RVR.  Hold for  systolic blood pressure less than 90  -Received 500 mL saline bolus in the emergency department.  Would be cautious with additional fluid given concern for valvular heart failure, though generally patient appears mildly hypovolemic to euvolemic.  Patient had borderline low blood pressures today but he is asymptomatic from this.  We will continue to monitor for now and consider addition of 100 cc fluid bolus if his blood pressure does not improve by this afternoon.  -Repeat CT chest without contrast showed worsening right-sided infiltrates consistent with aspiration pneumonia.     Chronic aspiration with aspiration pneumonia: Had a percutaneous G-tube placed via surgical team at the end of July for chronic aspiration and failure to thrive; IR was not an option as colon was overlying stomach.  Failure to thrive in an adult with severe protein calorie malnutrition:  -Nutrition consulted for resumption of tube feedings; may need to consider change in formulation given ongoing diarrhea despite as needed Imodium at care facility.  Was on Jevity 1.5 in July, uncertain what tube feeding formulation he was switched to at care facility.  -N.p.o.; meds and nutrition through feeding tube.  Tube feeding flushes per protocol.  Tube feed rate has been reduced to 30 cc an hour.  Nutrition services following closely.  For now patient seems to be tolerating this rate with moderate diarrhea.  He might have to deal with some amount of diarrhea while on tube feeds.  I do not think that TPN is a good idea in this case since he is at high risk for developing infections with his overall malnutrition and frailty.  -Physical therapy consulted  -     Aspiration pneumonia, : Chest x-ray showed increasing right-sided infiltrates.  -Received cefepime and azithromycin in the emergency department.  Procalcitonin added on and undetectable.  Discontinuing additional anti-infectives  -CT chest without contrast done yesterday did show worsening  infiltrates on the right side compared to last left concerning for aspiration pneumonia.  Started on ceftriaxone and Flagyl for a 5-day course to cover for aspiration.  He tolerated ceftriaxone in the past despite penicillin allergies.     Persistent diarrhea: Patient reports this has been an ongoing issue since G-tube placement.  He expresses an interest in colostomy so that he might avoid ongoing episodes of diarrhea and need for pericares.  Discussed with patient that a colostomy in this instance would be for quality of life, and an elective procedure.  With his valvular heart failure and tenuous respiratory status with chronic aspiration, would be a poor surgical candidate for this, and focus should be on attempting to adjust to pain formula to treat likely diarrhea associated with tube feeds versus determine underlying etiology of diarrhea and provide treatment.  Note that in 2005, patient had a cecal biopsy with pathology consistent with collagenous colitis.  -Nutrition consulted for resumption of tube feeds; consider addition of fiber to feed regimen given persistent diarrhea  -Intake and output, daily weights; will need to match output  -C. difficile, enteric panel negative.  -Potassium, magnesium replacement protocols in place  -Loperamide increased to 4 mg.  Added Neutrisource fiber 1 packet via G-tube twice a day to help with diarrhea  -[facility] consulted -recommended adding budesonide and fiber to his diet and if he has no improvement with diarrhea then will consider sigmoidoscopy or colonoscopy.  Unfortunately budesonide is enteric-coated and cannot be given through his G-tube.  For now diarrhea seems to be improved with reduce the rate of tube feeds and adding fiber and probiotic.  Continue to monitor.     Stage I sacral pressure ulceration: Present on admission.  Nonblanching erythema present.  No open sores appreciated.  -Wound nurse consulted  -Frequent repositioning      Leukocytosis:  Improving.   "Started on antibiotics for aspiration pneumonia.  GI pathogen panel including C. difficile have been negative so far.     Coronary artery disease: History of extensive proximal to distal RCA stenting.  Patent on last angiogram in May 2020.  History of CVA  -Continue prior to admission apixaban anticoagulation  -Continue prior to admission Plavix  -Continue prior to admission atorvastatin      MGUS: February 2022 bone marrow biopsy with hypercellular marrow including 7% plasma cells. Followed w/ [id number] through oncology service.  -continue with outpatient follow up           Diet:   N.p.o.; nutrition consulted for resumption of tube feeds at 45 cc an hour.  DVT Prophylaxis: DOAC  Bravo Catheter: Not present  Central Lines: None  Cardiac Monitoring: None  Code Status:   Full code.  Discussed and confirmed with patient on admission.        Clinically Significant Risk Factors Present on Admission            # Hyponatremia: Na = 132 mmol/L (Ref range: 133 - 144 mmol/L) on admission, will monitor as appropriate   -resolved  # Hypoalbuminemia: Albumin = 2.0 g/dL (Ref range: 3.4 - 5.0 g/dL) on admission, will monitor as appropriate   # Coagulation Defect: home medication list includes an anticoagulant medication  # Platelet Defect: home medication list includes an antiplatelet medication              Disposition Plan     Patient is medically stable for discharge with moderate control of diarrhea with reduction in tube feed rate.  Will need TCU placement.  Social work following along.      Clinically Significant Risk Factors Present on Admission               # Cachexia: Estimated body mass index is 16.65 kg/m  as calculated from the following:    Height as of 8/17/22: 1.702 m (5' 7\").    Weight as of this encounter: 48.2 kg (106 lb 4.8 oz).    # Severe Malnutrition: based on nutrition assessment        Courtney Chirinos MD, MD  [phone](p)  [phone]( c)  "

## 2022-09-09 NOTE — PLAN OF CARE
Goal Outcome Evaluation:  A&Ox4, denies pain. More upbeat today. Tele remains AFIB mostly CVR, SBP remains low but Map is >65 and pt is asymptomatic. Tube feed changed, currently running at 10 mL/hr, next change at midnight. Three very small loose stools today. Up to chair with lift. Plan to continue antibiotics, control diarrhea.

## 2022-09-09 NOTE — PROGRESS NOTES
CLINICAL NUTRITION SERVICES BRIEF NOTE  Refer to RD note dated 9/8 for full reassessment.     New Findings  - Spoke to patient's son over the phone this morning to discuss nutrition plan.     Per son:     - Loose stooling was normal for patient prior to FT placement, however it was more regulates and pt was able to sense when it was coming so it did not affect QOL.   - since FT placed pt has had significantly increased frequency and volume of stooling, to the point where he never knows when it will happen, and often ends up lying in his stool. It also exacerbates his wounds.   - he wonders if we can further reduce the volume of his feeds as he seemed to tolerate a rate of about 30 mL/hr best. States that if it came down to meeting 100% of estimated needs vs not stooling as often, pt would say he doesn't want to stool so much.   - We discussed other options - for today, we will adjust the formula and reduce the rate.   - Next steps would be to try an elemental formula, then start IV nutrition.     Intervention  - Change formula to TwoCal HN with goal of 30 mL/hr. TwoCal @ 30 mL/hr provides 720 mL, 1440 kcal, 60 g protein, 158 g CHO, 3.6 g fiber, 504 mL free water.   - Increase fluid flushes to 160 mL q 4 hours for hydration.     - If frequent loose stooling continues (significantly affecting quality fo life) will need to consider the option of an elemental formula vs IV nutrition.     Manjula Lau RD, LD  [facility], [facility], [facility]  Pager: [phone]  Weekend Pager: [phone]

## 2022-09-10 NOTE — PLAN OF CARE
Goal Outcome Evaluation:    Plan of Care Reviewed With: patient     Overall Patient Progress: no change         Neuro:intact  CV/Rhythm:SR  Resp/02:RA, occasionally 86-89% on RA while sleeping  GI/Diet:diarrhea x 2 today (one large) NPO, TF increased at 1300 to goal of 30cc/hr  :incont at times, UA sent  Skin/Incisions/Sites:coccynx/buttocks intact blanchable pink, mepilex applied, B heels pink blanchable, suspended on pillows, mepilex to B heels, pulsate mattress, turned q2hr, up to chair for 1 hour with chair cushion  Pulses/CMS:intACT  Edema:none  Activity/Falls Risk:fall risk, turned up to chair   Lines/Drains/IVs:PIV x 1, PEG tube  Labs/BGM:K and mag protocol recheck in AM  Test/Procedures:none  VS/Pain:soft BPs, denies pain  DC Plan:TCU  Other:keep map <60, NPO,  q4h

## 2022-09-10 NOTE — PROGRESS NOTES
Pt is A&O x 4, calm and cooperative for cares. VSS on RA, denied pain/SOB. Incontinent to B/B, Pt had 1 large and 1 small loose stools during this shift.. K 3.8. Turn and repposition frequently. Tube feeding 20 mL/hr with 160 mL water flushes Q 4 hours. Tele Afib with CVR. Continue plan of cares.

## 2022-09-10 NOTE — PROGRESS NOTES
[facility]    Internal Medicine Hospitalist Progress Note  09/10/2022  I evaluated patient on the above date.    Mj Tinsley Jr., MD  [phone] (p)  Text Page  Vocera        Assessment & Plan New actions/orders today (09/10/2022) are underlined.    Efrem Ramos is a 77 year old male with history including pulmonary fibrosis; chronic dysphagia with h/o aspiration pneumonia; severe AS; CAD; PAD; afib/flutter; stroke; MEL; MGUS; ACD; and recent hospitalization (7/21-8/5/2022) for failure to thrive with malnutrition related to dysphagia, s/p PEG tube (7/28/2022). He presented 9/5/2022 with ongoing diarrhea and worsening cough and is was to have atrial fibrillation with rapid ventricular rate, possible aspiration pneumonia with right-sided infiltrate.     On initial evaluation 9/5, pt was afebrile, tachycardic; ECG showed afib with RVR, iRBBB; WBC 18.3, hgb 9.3, NTP-BNP 4330, procalcitonin <0.05, lactate normal. CXR 9/5 showed  persistent infiltrates throughout the right long, which appeared slightly increased, as compared to the previous chest CT (7/2022); findings were superimposed on a background of pulmonary fibrosis; blunting of the right costophrenic angle appeared chronic.      Acute on chronic CHF exacerbation (valvular).  Severe AS.  Severe TR.  Afib with RVR contributing or related to above.  * PTA on apixaban 5 mg BID and digoxin 125 mcg daily. Pt with chronic hypotension, limiting cardiac meds.  * Initial presentation as above. Cardiology consulted on admit.  * Echo 9/6 showed LVEF 60-65%; RV moderate to severely dilated with normal RV systolic function; signs of RV pressure/volume overload; severe AS; severe TR; mild-moderate MR; pHTN present; trivial pericardial effusion.  * Cardiology evaluated patient and recommended medical management for his severe aortic stenosis since he is not a candidate for TAVR due to his various underlying medical problems.  They also recommended  palliative and hospice consult due to his progressive end-stage aortic stenosis.   * Patient refused hospice consult and wanted to proceed with aggressive measures.  * CT chest 9/7 showed findings more consistent with pneumonia on top of pulmonary fibrosis (see below).  - Continue apixaban; digoxin 125 mcg daily.  - Continue PRN IV metoprolol 2.5 mg q4h.  - Appreciate Cardiology help.    Pneumonia, suspect aspiration.  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  * Pt with h/o dysphagia and chronic aspiration s/p PEG-tube 7/28.  * Initial presentation as above. Given antibiotics in ED; not continued after admit given suspected chronic findings on presentation. BC's 9/5 NG.  * CT chest 9/7 showed new patchy consolidative opacities in both lungs, right greater than left, noted this most likely represented pneumonia superimposed on the patient's background fibrosis.  * Started on ceftriaxone and metronidazole 9/8.  Recent Labs   Lab 09/10/22  0818 09/07/22  0601 09/06/22  0634 09/05/22  2251 09/05/22  2237 09/05/22 2058 09/05/22 2038   WBC  --  11.3*  --   --   --   --  18.3*   LACT 0.9  --   --   --  1.3 2.5*  --    PCAL  --   --   --  <0.05  --   --   --    CRP  --   --  88.1*  --   --   --   --    - Continue ceftriaxone (started 9/8) and metronidazole (started 9/8).  - Check sputum.  - Continue PRN ipratropium-albuterol nebs.    Dysphagia with suspected chronic aspiration, s/p PEG-tube placement (7/28/2022).  * Had a percutaneous G-tube placed via surgical team 7/28 for chronic aspiration and failure to thrive; IR was not an option as colon was overlying stomach.  - Continue TF's via PEG tube.  - Change/adjust formula as needed, given diarrhea (see below).    Persistent diarrhea, suspect related to TF's.  H/o collagenous colitis.  * On admit, patient reported diarrhea has been an ongoing issue since PEG-tube placement. On admit, he expressed an  interest in colostomy so that he might avoid ongoing episodes of diarrhea and need for pericares; it was discussed with patient that a colostomy in this instance would be for quality of life, and an elective procedure and with his valvular heart failure and tenuous respiratory status with chronic aspiration, would be a poor surgical candidate for this, and focus should be on attempting to adjust to pain formula to treat likely diarrhea associated with tube feeds versus determine underlying etiology of diarrhea and provide treatment. On admit, noted that in 2005, patient had a cecal biopsy with pathology consistent with collagenous colitis.  * PRN loperamide increased on admit. Nutrition consulted to consider addition of fiber to feed regimen given persistent diarrhea. GI consulted on admit.  * C. Difficile, enteric panel negative 9/6. Seen by GI 9/6 who recommended adding budesonide, however it is enteric coated and could not be given via PEG-tube. Added fiber packet 9/6.  * On 9/8, noted that diarrhea improved.  * Budesonide suspension added 9/9.  - Continue budesonide suspension BID; fiber packet daily.  - Change/adjust formula as needed.  - Continue PRN loperamide 4 mg.  - Appreciate GI help.     Anemia, suspect chronic component.  * Hgb 9.3 on admit. No overt clinical signs of major bleeding.  Recent Labs   Lab 09/07/22  0601 09/05/22 2038   HGB 8.3* 9.3*   - Monitor CBC.  - Consider prbc transfusion if hgb </= 7.0 or if significant bleeding with hemodynamic instability or if symptomatic.    Stage I sacral pressure ulceration, present on admission.    * On admit, noted nonblanching erythema present; no open sores appreciated. WOC RN consulted.  - Continue local wound cares.  - Continue frequent repositioning.    Weakness and physical deconditioning due to multiple acute and chronic medical issues.  - Continue PT and OT.      Coronary artery disease.  * Known CAD with history of extensive proximal to distal RCA  stenting; patent on last angiogram in May 2020.  - Continue clopidogrel.    CVA history.  - Continue apixaban, atorvastatin.     MGUS.  * February 2022 bone marrow biopsy with hypercellular marrow including 7% plasma cells. Followed w/ q6 month SPEP through oncology service.  - Follow-up outpatient.       Clinically Significant Risk Factors Present on Admission                        COVID-19 testing.  COVID-19 PCR Results    COVID-19 PCR Results 1/10/22 1/18/22 7/21/22 7/29/22 9/5/22   SARS CoV2 PCR Negative Negative Negative Negative Negative      Comments are available for some flowsheets but are not being displayed.         COVID-19 Antibody Results, Testing for Immunity    COVID-19 Antibody Results, Testing for Immunity   No data to display.             Diet: NPO for Medical/Clinical Reasons Except for: NPO but receiving Tube Feeding  Snacks/Supplements Adult: Nutrisource Fiber; Between Meals  Adult Formula Drip Feeding: Continuous TwoCal HN; Gastrostomy; Goal Rate: 30; mL/hr; Medication - Feeding Tube Flush Frequency: At least 15-30 mL water before and after medication administration and with tube clogging; Amount to Send (Nutrition us...    Prophylaxis: PCD's, ambulation. On apixaban.  Bravo Catheter: Not present  Central Lines: None  Code Status: Full Code    Disposition Plan   Expected discharge: 2-3d recommended to prior living arrangement pending above.  Entered: Mj Tinsley MD 09/10/2022, 9:26 AM       I spent approximately 45 minutes bedside and on the inpatient unit today managing the care of patient. Over 50% of my time on the unit was spent in extensive chart review, counseling the patient/family and/or coordinating care regarding services listed in this note.    Interval History   Doing about the same.  Feels weak.    -Data reviewed today: I reviewed all new labs and imaging over the last 24 hours. I personally reviewed no images or EKG's today.    Physical Exam    , Blood pressure 92/67,  pulse 91, temperature 98.8  F (37.1  C), temperature source Axillary, resp. rate 18, weight 48.2 kg (106 lb 4.8 oz), SpO2 94 %. O2 Device: None (Room air)    Vitals:    09/07/22 0615 09/08/22 0356 09/09/22 0608   Weight: 48.4 kg (106 lb 9.6 oz) 47.9 kg (105 lb 9.6 oz) 48.2 kg (106 lb 4.8 oz)     Vital Signs with Ranges  Temp:  [97.5  F (36.4  C)-98.8  F (37.1  C)] 98.8  F (37.1  C)  Pulse:  [91] 91  Resp:  [18-20] 18  BP: (84-94)/(59-67) 92/67  SpO2:  [91 %-94 %] 94 %  Patient Vitals for the past 24 hrs:   BP Temp Temp src Pulse Resp SpO2   09/10/22 0726 92/67 98.8  F (37.1  C) Axillary 91 18 94 %   09/09/22 2017 94/60 97.7  F (36.5  C) Oral -- -- 91 %   09/09/22 1548 (!) 84/59 97.5  F (36.4  C) Oral -- 20 94 %     I/O's Last 24 hours  I/O last 3 completed shifts:  In: 1135 [I.V.:200; NG/GT:595]  Out: 100 [Urine:100]    Constitutional: Awake, alert, frail, fatigue.  Respiratory: Diminished in bases. Crackles in bases, no wheezes.  Cardiovascular: Irregular, rate normal, +I/VI systolic m, no g/r.  GI: Soft, nt, nd, +BS.  Skin/Integumen:   Other:        Data   Recent Labs   Lab 09/10/22  0818 09/09/22  1253 09/09/22  0630 09/09/22  0148 09/08/22  2115 09/08/22  1720 09/08/22  0540 09/07/22  1150 09/07/22  0601 09/05/22  2038   WBC  --   --   --   --   --   --   --   --  11.3* 18.3*   HGB  --   --   --   --   --   --   --   --  8.3* 9.3*   MCV  --   --   --   --   --   --   --   --  100 99   PLT  --   --   --   --   --   --   --   --  490* 602*   NA  --   --   --   --   --   --  133  --  135 132*   POTASSIUM  --  3.8 3.1*  --   --   --  3.6   < > 3.3* 4.5   CHLORIDE  --   --   --   --   --   --  104  --  106 101   CO2  --   --   --   --   --   --  25  --  25 26   BUN  --   --   --   --   --   --  26  --  28 31*   CR  --   --   --   --   --   --  0.60*  --  0.62* 0.54*   ANIONGAP  --   --   --   --   --   --  4  --  4 5   ULISSES  --   --   --   --   --   --  8.1*  --  8.2* 8.4*   *  --   --  105* 111*   < > 131*  --   106* 101*   ALBUMIN  --   --   --   --   --   --   --   --  1.8* 2.0*   PROTTOTAL  --   --   --   --   --   --   --   --  7.1 8.4   BILITOTAL  --   --   --   --   --   --   --   --  0.5 0.5   ALKPHOS  --   --   --   --   --   --   --   --  93 110   ALT  --   --   --   --   --   --   --   --  14 16   AST  --   --   --   --   --   --   --   --  24 34   LIPASE  --   --   --   --   --   --   --   --   --  47*    < > = values in this interval not displayed.     Recent Labs   Lab Test 09/10/22  0818 09/09/22  0148 09/08/22  2115 09/08/22  1720 09/08/22  0540 05/20/20  0415 05/19/20  1254 05/19/20 0922 05/19/20  0850 05/19/20  0508 05/18/20 2025 05/18/20 2015 08/08/16  0648 08/04/16  0758   * 105* 111* 106* 131*   < >  --   --    < >  --    < >  --    < >  --    BGM  --   --   --   --   --   --  99 108*  --  111*  --  101*  --  107*    < > = values in this interval not displayed.     Recent Labs   Lab 09/10/22  0818 09/07/22  0601 09/06/22  0634 09/05/22 2251 09/05/22 2237 09/05/22 2058 09/05/22 2038   WBC  --  11.3*  --   --   --   --  18.3*   CRP  --   --  88.1*  --   --   --   --    LACT 0.9  --   --   --  1.3 2.5*  --    PCAL  --   --   --  <0.05  --   --   --          No results found for this or any previous visit (from the past 24 hour(s)).    Medications   All medications were reviewed.    dextrose       Reason anticoagulant not prescribed for atrial fibrillation       - MEDICATION INSTRUCTIONS -         acetaminophen  1,000 mg Per G Tube TID     apixaban ANTICOAGULANT  5 mg Per G Tube BID     atorvastatin  40 mg Per G Tube At Bedtime     banatrol plus  1 packet Per Feeding Tube TID     budesonide  4.5 mg Per G Tube BID     calcium carbonate 600 mg-vitamin D 400 units  1 tablet Per G Tube BID     cefTRIAXone  1 g Intravenous Q24H     clopidogrel  75 mg Per G Tube Daily     digoxin  125 mcg Per G Tube Daily     ferrous sulfate  220 mg Per G Tube Daily     fiber modular (NUTRISOURCE FIBER)  1 packet  Per Feeding Tube Daily     gabapentin  300 mg Per G Tube QAM     gabapentin  600 mg Per G Tube At Bedtime     metroNIDAZOLE  500 mg Intravenous Q12H     mirtazapine  15 mg Per G Tube At Bedtime     multivitamin  with lutein  1 capsule Per G Tube Daily     sodium chloride (PF)  3 mL Intracatheter Q8H     dextrose, HYDROmorphone, ipratropium - albuterol 0.5 mg/2.5 mg/3 mL, lidocaine 4%, lidocaine (buffered or not buffered), loperamide, melatonin, metoprolol, naloxone **OR** naloxone **OR** naloxone **OR** naloxone, Reason anticoagulant not prescribed for atrial fibrillation, ondansetron **OR** ondansetron, oxyCODONE, - MEDICATION INSTRUCTIONS -, prochlorperazine **OR** prochlorperazine **OR** prochlorperazine, sodium chloride (PF)

## 2022-09-10 NOTE — PROGRESS NOTES
Two loose incont BMs today. 1 large. RD paged for orders whether to increase feedings to 30cc/hr goal at 1300 today. Will give immodium PRN

## 2022-09-11 NOTE — PROVIDER NOTIFICATION
"MD Notification    Notified Person: MD    Notified Person Name: Mj Tinsley     Notification Date/Time: 09/11/22   4:51 PM     Notification Interaction: Christina     Purpose of Notification: \"FYI- pt recently stated he's had new onset of blurred vision since arriving to the floor at 1450. Neuro seem intact. No other symptoms noted. Son was just worried because of stoke history. Will continue to monitor.\"    Orders Received: Continue to monitor    Comments:      "

## 2022-09-11 NOTE — PROGRESS NOTES
Pt is A&O x 4, calm and cooperative for cares. VSS on RA, denied pain. NPO, PEG tube, tube feeding with goal rate 30 mL/hr and H2O flushes 160 mL Q 4 hours. Meplex dressing on coccyx and bilateral heels. Turn and reposition Q 2 hours. Slept between cares. Small loose stool x 1. Sputum to be collected. Tele Afib CVR. Continue plan of care.

## 2022-09-11 NOTE — PROGRESS NOTES
[facility]    Internal Medicine Hospitalist Progress Note  09/11/2022  I evaluated patient on the above date.    Mj Tinsley Jr., MD  [phone] (p)  Text Page  Vocera        Assessment & Plan New actions/orders today (09/11/2022) are underlined.    Efrem Ramos is a 77 year old male with history including pulmonary fibrosis; chronic dysphagia with h/o aspiration pneumonia; severe AS; CAD; PAD; afib/flutter; stroke; MEL; MGUS; ACD; and recent hospitalization (7/21-8/5/2022) for failure to thrive with malnutrition related to dysphagia, s/p PEG tube (7/28/2022). He presented 9/5/2022 with ongoing diarrhea and worsening cough and is was to have atrial fibrillation with rapid ventricular rate, possible aspiration pneumonia with right-sided infiltrate.     On initial evaluation 9/5, pt was afebrile, tachycardic; ECG showed afib with RVR, iRBBB; WBC 18.3, hgb 9.3, NTP-BNP 4330, procalcitonin <0.05, lactate normal. CXR 9/5 showed  persistent infiltrates throughout the right long, which appeared slightly increased, as compared to the previous chest CT (7/2022); findings were superimposed on a background of pulmonary fibrosis; blunting of the right costophrenic angle appeared chronic.      Acute on chronic CHF exacerbation (valvular).  Severe AS.  Severe TR.  Afib with RVR contributing or related to above.  * PTA on apixaban 5 mg BID and digoxin 125 mcg daily. Pt with chronic hypotension, limiting cardiac meds.  * Initial presentation as above. Cardiology consulted on admit.  * Echo 9/6 showed LVEF 60-65%; RV moderate to severely dilated with normal RV systolic function; signs of RV pressure/volume overload; severe AS; severe TR; mild-moderate MR; pHTN present; trivial pericardial effusion.  * Cardiology evaluated patient and recommended medical management for his severe aortic stenosis since he is not a candidate for TAVR due to his various underlying medical problems (from Cardiology note,  was seen in 11/2021 and deemed too high risk given frailty).  They also recommended palliative and hospice consult due to his progressive end-stage aortic stenosis.   * Patient refused hospice consult and wanted to proceed with aggressive measures.  * CT chest 9/7 showed findings more consistent with pneumonia on top of pulmonary fibrosis (see below).  - Continue apixaban; digoxin 125 mcg daily.  - Continue PRN IV metoprolol 2.5 mg q4h.    Pneumonia, suspect aspiration.  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  * Pt with h/o dysphagia and chronic aspiration s/p PEG-tube 7/28.  * Initial presentation as above. Given antibiotics in ED; not continued after admit given suspected chronic findings on presentation. BC's 9/5 NG.  * CT chest 9/7 showed new patchy consolidative opacities in both lungs, right greater than left, noted this most likely represented pneumonia superimposed on the patient's background fibrosis.  * Started on ceftriaxone and metronidazole 9/8.  * Sputum 9/10 orally contaminated.  Recent Labs   Lab 09/11/22  0601 09/10/22  0818 09/07/22  0601 09/06/22  0634 09/05/22  2251 09/05/22  2237 09/05/22 2058 09/05/22 2038   WBC 12.3*  --  11.3*  --   --   --   --  18.3*   LACT  --  0.9  --   --   --  1.3 2.5*  --    PCAL  --   --   --   --  <0.05  --   --   --    CRP  --   --   --  88.1*  --   --   --   --    - Continue ceftriaxone (started 9/8) and metronidazole (started 9/8).  - Continue PRN ipratropium-albuterol nebs.    Dysphagia with suspected chronic aspiration, s/p PEG-tube placement (7/28/2022).  * Had a percutaneous G-tube placed via surgical team 7/28 for chronic aspiration and failure to thrive; IR was not an option as colon was overlying stomach.  - Continue TF's via PEG tube.  - Change/adjust formula as needed, given diarrhea (see below).    Persistent diarrhea, suspect related to TF's.  H/o collagenous colitis.  * On admit,  patient reported diarrhea has been an ongoing issue since PEG-tube placement. On admit, he expressed an interest in colostomy so that he might avoid ongoing episodes of diarrhea and need for pericares; it was discussed with patient that a colostomy in this instance would be for quality of life, and an elective procedure and with his valvular heart failure and tenuous respiratory status with chronic aspiration, would be a poor surgical candidate for this, and focus should be on attempting to adjust to pain formula to treat likely diarrhea associated with tube feeds versus determine underlying etiology of diarrhea and provide treatment. On admit, noted that in 2005, patient had a cecal biopsy with pathology consistent with collagenous colitis.  * PRN loperamide increased on admit. Nutrition consulted to consider addition of fiber to feed regimen given persistent diarrhea. GI consulted on admit.  * C. Difficile, enteric panel negative 9/6. Seen by GI 9/6 who recommended adding budesonide, however it is enteric coated and could not be given via PEG-tube. Added fiber packet 9/6.  * On 9/8, noted that diarrhea improved.  * Budesonide suspension added 9/9.  - Continue budesonide suspension BID; fiber packet daily.  - Change/adjust formula as needed.  - Continue PRN loperamide 4 mg.  - Appreciate GI help.     Anemia, suspect chronic component.  * Hgb 9.3 on admit. No overt clinical signs of major bleeding.  Recent Labs   Lab 09/11/22  0601 09/07/22  0601 09/05/22 2038   HGB 8.7* 8.3* 9.3*   - Monitor CBC.  - Consider prbc transfusion if hgb </= 7.0 or if significant bleeding with hemodynamic instability or if symptomatic.    Stage I sacral pressure ulceration, present on admission.    * On admit, noted nonblanching erythema present; no open sores appreciated. WOC RN consulted.  - Continue local wound cares.  - Continue frequent repositioning.    Weakness and physical deconditioning due to multiple acute and chronic medical  issues.  - Continue PT and OT.      Coronary artery disease.  * Known CAD with history of extensive proximal to distal RCA stenting; patent on last angiogram in May 2020.  - Continue clopidogrel.    CVA history.  - Continue apixaban, atorvastatin.     MGUS.  * February 2022 bone marrow biopsy with hypercellular marrow including 7% plasma cells. Followed w/ q6 month SPEP through oncology service.  - Follow-up outpatient.       Clinically Significant Risk Factors Present on Admission                        COVID-19 testing.  COVID-19 PCR Results    COVID-19 PCR Results 1/10/22 1/18/22 7/21/22 7/29/22 9/5/22   SARS CoV2 PCR Negative Negative Negative Negative Negative      Comments are available for some flowsheets but are not being displayed.         COVID-19 Antibody Results, Testing for Immunity    COVID-19 Antibody Results, Testing for Immunity   No data to display.             Diet: NPO for Medical/Clinical Reasons Except for: NPO but receiving Tube Feeding  Snacks/Supplements Adult: Nutrisource Fiber; Between Meals  Adult Formula Drip Feeding: Continuous TwoCal HN; Gastrostomy; Goal Rate: 30; mL/hr; Medication - Feeding Tube Flush Frequency: At least 15-30 mL water before and after medication administration and with tube clogging; Amount to Send (Nutrition us...    Prophylaxis: PCD's, ambulation. On apixaban.  Bravo Catheter: Not present  Central Lines: None  Code Status: Full Code    Disposition Plan   Expected discharge: 2-3d recommended to prior living arrangement pending above.  Entered: Mj Tinsley MD 09/11/2022, 8:21 AM           Interval History   Feels weaker today.  Discouraged that not felt to be a candidate for TAVR at this time.    -Data reviewed today: I reviewed all new labs and imaging over the last 24 hours. I personally reviewed no images or EKG's today.    Physical Exam    , Blood pressure 110/71, pulse 82, temperature 98  F (36.7  C), temperature source Axillary, resp. rate 21, weight  49 kg (108 lb), SpO2 97 %.      Vitals:    09/08/22 0356 09/09/22 0608 09/11/22 0640   Weight: 47.9 kg (105 lb 9.6 oz) 48.2 kg (106 lb 4.8 oz) 49 kg (108 lb)     Vital Signs with Ranges  Temp:  [97.5  F (36.4  C)-98  F (36.7  C)] 98  F (36.7  C)  Pulse:  [77-82] 82  Resp:  [21-22] 21  BP: ()/(63-71) 110/71  SpO2:  [96 %-97 %] 97 %  Patient Vitals for the past 24 hrs:   BP Temp Temp src Pulse Resp SpO2 Weight   09/11/22 0801 110/71 98  F (36.7  C) Axillary 82 21 97 % --   09/11/22 0640 -- -- -- -- -- -- 49 kg (108 lb)   09/10/22 1600 96/63 -- -- -- -- -- --   09/10/22 1530 -- 97.6  F (36.4  C) Oral -- -- -- --   09/10/22 1100 103/69 97.5  F (36.4  C) Oral 77 22 96 % --     I/O's Last 24 hours  I/O last 3 completed shifts:  In: 1390 [I.V.:200; NG/GT:950]  Out: 675 [Urine:675]    Constitutional: Awake, alert, more talkative.  Respiratory: Diminished in bases. Crackles in bases, no wheezes.  Cardiovascular: Irregular, rate normal, +I/VI systolic m, no g/r.  GI: Soft, nt, nd, +BS.  Skin/Integumen: No bilateral lower extremity edema.  Other:        Data   Recent Labs   Lab 09/11/22  0601 09/11/22  0156 09/10/22  2124 09/10/22  0818 09/09/22  1253 09/09/22  0630 09/08/22  1720 09/08/22  0540 09/07/22  1150 09/07/22  0601 09/05/22  2038   WBC 12.3*  --   --   --   --   --   --   --   --  11.3* 18.3*   HGB 8.7*  --   --   --   --   --   --   --   --  8.3* 9.3*     --   --   --   --   --   --   --   --  100 99   *  --   --   --   --   --   --   --   --  490* 602*     --   --   --   --   --   --  133  --  135 132*   POTASSIUM 3.7  --   --   --  3.8 3.1*  --  3.6   < > 3.3* 4.5   CHLORIDE 108  --   --   --   --   --   --  104  --  106 101   CO2 22  --   --   --   --   --   --  25  --  25 26   BUN 19  --   --   --   --   --   --  26  --  28 31*   CR 0.59*  --   --   --   --   --   --  0.60*  --  0.62* 0.54*   ANIONGAP 6  --   --   --   --   --   --  4  --  4 5   ULISSES 8.1*  --   --   --   --   --   --   8.1*  --  8.2* 8.4*   * 114* 111*   < >  --   --    < > 131*  --  106* 101*   ALBUMIN  --   --   --   --   --   --   --   --   --  1.8* 2.0*   PROTTOTAL  --   --   --   --   --   --   --   --   --  7.1 8.4   BILITOTAL  --   --   --   --   --   --   --   --   --  0.5 0.5   ALKPHOS  --   --   --   --   --   --   --   --   --  93 110   ALT  --   --   --   --   --   --   --   --   --  14 16   AST  --   --   --   --   --   --   --   --   --  24 34   LIPASE  --   --   --   --   --   --   --   --   --   --  47*    < > = values in this interval not displayed.     Recent Labs   Lab Test 09/11/22  0601 09/11/22  0156 09/10/22  2124 09/10/22  1605 09/10/22  1141 05/20/20  0415 05/19/20  1254 05/19/20  0922 05/19/20  0850 05/19/20  0508 05/18/20 2025 05/18/20 2015 08/08/16  0648 08/04/16  0758   * 114* 111* 122* 111*   < >  --   --    < >  --    < >  --    < >  --    BGM  --   --   --   --   --   --  99 108*  --  111*  --  101*  --  107*    < > = values in this interval not displayed.     Recent Labs   Lab 09/11/22  0601 09/10/22  0818 09/07/22  0601 09/06/22  0634 09/05/22 2251 09/05/22 2237 09/05/22 2058 09/05/22 2038   WBC 12.3*  --  11.3*  --   --   --   --  18.3*   CRP  --   --   --  88.1*  --   --   --   --    LACT  --  0.9  --   --   --  1.3 2.5*  --    PCAL  --   --   --   --  <0.05  --   --   --          No results found for this or any previous visit (from the past 24 hour(s)).    Medications   All medications were reviewed.    dextrose       Reason anticoagulant not prescribed for atrial fibrillation       - MEDICATION INSTRUCTIONS -         acetaminophen  1,000 mg Per G Tube TID     apixaban ANTICOAGULANT  5 mg Per G Tube BID     atorvastatin  40 mg Per G Tube At Bedtime     banatrol plus  1 packet Per Feeding Tube TID     budesonide  4.5 mg Per G Tube BID     calcium carbonate 600 mg-vitamin D 400 units  1 tablet Per G Tube BID     cefTRIAXone  1 g Intravenous Q24H     clopidogrel  75 mg Per G  Tube Daily     digoxin  125 mcg Per G Tube Daily     ferrous sulfate  220 mg Per G Tube Daily     fiber modular (NUTRISOURCE FIBER)  1 packet Per Feeding Tube Daily     gabapentin  300 mg Per G Tube QAM     gabapentin  600 mg Per G Tube At Bedtime     metroNIDAZOLE  500 mg Intravenous Q12H     mirtazapine  15 mg Per G Tube At Bedtime     multivitamin  with lutein  1 capsule Per G Tube Daily     sodium chloride (PF)  3 mL Intracatheter Q8H     dextrose, HYDROmorphone, ipratropium - albuterol 0.5 mg/2.5 mg/3 mL, lidocaine 4%, lidocaine (buffered or not buffered), loperamide, melatonin, metoprolol, naloxone **OR** naloxone **OR** naloxone **OR** naloxone, Reason anticoagulant not prescribed for atrial fibrillation, ondansetron **OR** ondansetron, oxyCODONE, - MEDICATION INSTRUCTIONS -, prochlorperazine **OR** prochlorperazine **OR** prochlorperazine, sodium chloride (PF)

## 2022-09-12 NOTE — PLAN OF CARE
Goal Outcome Evaluation:    Plan of Care Reviewed With: patient     Overall Patient Progress: no change    Outcome Evaluation: Diet: NPO.  EN via G-tube: 2CalHN @ 30 mL/hr (goal rate) + Nutrisource Fiber qd + Banatrol TID.  Pt reports some improvement in stooling with current regimen.  Recommend consider scheduled immodium (currently prn).  Recommend daily probiotic (ie Florastor)

## 2022-09-12 NOTE — PROGRESS NOTES
[facility]    Internal Medicine Hospitalist Progress Note  09/12/2022  I evaluated patient on the above date.    Mj Tinsley Jr., MD  [phone] (p)  Text Page  Vocera        Assessment & Plan New actions/orders today (09/12/2022) are underlined.    Efrem Ramos is a 77 year old male with history including pulmonary fibrosis; chronic dysphagia with h/o aspiration pneumonia; severe AS; CAD; PAD; afib/flutter; stroke; MEL; MGUS; ACD; and recent hospitalization (7/21-8/5/2022) for failure to thrive with malnutrition related to dysphagia, s/p PEG tube (7/28/2022). He presented from TCU 9/5/2022 with ongoing diarrhea and worsening cough and is was to have atrial fibrillation with rapid ventricular rate, possible aspiration pneumonia with right-sided infiltrate.     On initial evaluation 9/5, pt was afebrile, tachycardic; ECG showed afib with RVR, iRBBB; WBC 18.3, hgb 9.3, NTP-BNP 4330, procalcitonin <0.05, lactate normal. CXR 9/5 showed persistent infiltrates throughout the right long, which appeared slightly increased, as compared to the previous chest CT (7/2022); findings were superimposed on a background of pulmonary fibrosis; blunting of the right costophrenic angle appeared chronic.      Pneumonia, suspect aspiration.  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  * Pt with h/o dysphagia and chronic aspiration s/p PEG-tube 7/28.  * Initial presentation as above. Given antibiotics in ED; not continued after admit given suspected chronic findings on presentation. BC's 9/5 NG.  * CT chest 9/7 showed new patchy consolidative opacities in both lungs, right greater than left, noted this most likely represented pneumonia superimposed on the patient's background fibrosis.  * Started on ceftriaxone and metronidazole 9/8.  * Sputum 9/10 orally contaminated.  Recent Labs   Lab 09/11/22  0930 09/11/22  0601 09/10/22  0818  09/07/22  0601 09/06/22  0634 09/05/22  2251 09/05/22 2237 09/05/22 2058 09/05/22 2038   WBC  --  12.3*  --  11.3*  --   --   --   --  18.3*   LACT 1.1  --  0.9  --   --   --  1.3 2.5*  --    PCAL  --   --   --   --   --  <0.05  --   --   --    CRP  --   --   --   --  88.1*  --   --   --   --    - Stop ceftriaxone (started 9/8) and metronidazole (started 9/8) and change to cefuroxime and PO metronidazole to stop after 9/17.  - Continue PRN ipratropium-albuterol nebs.    Acute on chronic CHF exacerbation (valvular).  Afib with RVR contributing or related to above.  Severe AS.  Severe TR.  * PTA on apixaban 5 mg BID and digoxin 125 mcg daily. Pt with chronic hypotension, limiting cardiac meds.  * Initial presentation as above. Cardiology consulted on admit.  * Echo 9/6 showed LVEF 60-65%; RV moderate to severely dilated with normal RV systolic function; signs of RV pressure/volume overload; severe AS; severe TR; mild-moderate MR; pHTN present; trivial pericardial effusion.  * Cardiology evaluated patient and recommended medical management for his severe aortic stenosis since he is not a candidate for TAVR due to his various underlying medical problems (from Cardiology note, was seen in 11/2021 and deemed too high risk given frailty).  They also recommended palliative and hospice consult due to his progressive end-stage aortic stenosis.   * Patient refused hospice consult and wanted to proceed with aggressive measures.  * CT chest 9/7 showed findings more consistent with pneumonia on top of pulmonary fibrosis (see below).  - Continue apixaban; digoxin 125 mcg daily.  - Continue PRN IV metoprolol 2.5 mg q4h.  - Follow-up with Cardiology outpatient.    Dysphagia with suspected chronic aspiration, s/p PEG-tube placement (7/28/2022).  * Had a percutaneous G-tube placed via surgical team 7/28 for chronic aspiration and failure to thrive; IR was not an option as colon was overlying stomach.  - Continue TF's via PEG  tube.  - Change/adjust formula as needed, given diarrhea (see below).    Persistent diarrhea, suspect related to TF's.  H/o collagenous colitis.  * On admit, patient reported diarrhea has been an ongoing issue since PEG-tube placement. On admit, he expressed an interest in colostomy so that he might avoid ongoing episodes of diarrhea and need for pericares; it was discussed with patient that a colostomy in this instance would be for quality of life, and an elective procedure and with his valvular heart failure and tenuous respiratory status with chronic aspiration, would be a poor surgical candidate for this, and focus should be on attempting to adjust to pain formula to treat likely diarrhea associated with tube feeds versus determine underlying etiology of diarrhea and provide treatment. On admit, noted that in 2005, patient had a cecal biopsy with pathology consistent with collagenous colitis.  * PRN loperamide increased on admit. Nutrition consulted to consider addition of fiber to feed regimen given persistent diarrhea. GI consulted on admit.  * C. Difficile, enteric panel negative 9/6. Seen by GI 9/6 who recommended adding budesonide, however it is enteric coated and could not be given via PEG-tube. Added fiber packet 9/6.  * On 9/8, noted that diarrhea improved.  * Budesonide suspension added 9/9.  - Continue budesonide suspension BID; fiber packet daily.  - Change/adjust formula as needed.  - Continue PRN loperamide 4 mg.  - Appreciate GI help.     Anemia, suspect chronic component.  * Hgb 9.3 on admit. No overt clinical signs of major bleeding.  Recent Labs   Lab 09/11/22  0601 09/07/22 0601 09/05/22 2038   HGB 8.7* 8.3* 9.3*   - Monitor CBC.  - Consider prbc transfusion if hgb </= 7.0 or if significant bleeding with hemodynamic instability or if symptomatic.    Thrombocytosis, suspect reactive.  * Plts 602K on admit 9/5.  Recent Labs   Lab 09/12/22  0637 09/11/22  0601 09/07/22  0601 09/05/22 2038   PLT  518* 527* 490* 602*   - Treat other issues as noted.  - Monitor CBC.    Stage I sacral pressure ulceration, present on admission.    * On admit, noted nonblanching erythema present; no open sores appreciated. WO RN consulted.  - Continue local wound cares.  - Continue frequent repositioning.    Weakness and physical deconditioning due to multiple acute and chronic medical issues.  * Recent prolonged hospitalization at [facility] (7/21-8/5/2022) as above. Was at TCU PTA.  - Continue PT and OT.  - Plan discharge to different TCU.    Coronary artery disease.  * Known CAD with history of extensive proximal to distal RCA stenting; patent on last angiogram in May 2020.  - Continue clopidogrel.    CVA history.  - Continue apixaban, atorvastatin.     MGUS.  * February 2022 bone marrow biopsy with hypercellular marrow including 7% plasma cells. Followed w/ q6 month SPEP through oncology service.  - Follow-up outpatient.       Clinically Significant Risk Factors Present on Admission                        COVID-19 testing.  COVID-19 PCR Results    COVID-19 PCR Results 1/10/22 1/18/22 7/21/22 7/29/22 9/5/22   SARS CoV2 PCR Negative Negative Negative Negative Negative      Comments are available for some flowsheets but are not being displayed.         COVID-19 Antibody Results, Testing for Immunity    COVID-19 Antibody Results, Testing for Immunity   No data to display.             Diet: NPO for Medical/Clinical Reasons Except for: NPO but receiving Tube Feeding  Snacks/Supplements Adult: Nutrisource Fiber; Between Meals  Adult Formula Drip Feeding: Continuous TwoCal HN; Gastrostomy; Goal Rate: 30; mL/hr; Medication - Feeding Tube Flush Frequency: At least 15-30 mL water before and after medication administration and with tube clogging; Amount to Send (Nutrition us...    Prophylaxis: PCD's, ambulation. On apixaban.  Bravo Catheter: Not present  Central Lines: None  Code Status: Full Code    Disposition Plan   Expected discharge:  "Tomorrow recommended to transitional care unit pending continued improvement and bed availability.  Entered: Mj Tinsley MD 09/12/2022, 7:21 AM           Interval History   Had a \"blow out (diarrhea)\" this am.  Otherwise, stable.    -Data reviewed today: I reviewed all new labs and imaging over the last 24 hours. I personally reviewed no images or EKG's today.    Physical Exam    , Blood pressure 96/64, pulse 79, temperature 97.4  F (36.3  C), temperature source Axillary, resp. rate 16, weight 49 kg (108 lb), SpO2 99 %. O2 Device: None (Room air)    Vitals:    09/08/22 0356 09/09/22 0608 09/11/22 0640   Weight: 47.9 kg (105 lb 9.6 oz) 48.2 kg (106 lb 4.8 oz) 49 kg (108 lb)     Vital Signs with Ranges  Temp:  [97.4  F (36.3  C)-98  F (36.7  C)] 97.4  F (36.3  C)  Pulse:  [78-82] 79  Resp:  [16-21] 16  BP: ()/(64-71) 96/64  SpO2:  [97 %-99 %] 99 %  Patient Vitals for the past 24 hrs:   BP Temp Temp src Pulse Resp SpO2   09/12/22 0126 96/64 97.4  F (36.3  C) Axillary -- 16 --   09/11/22 1531 98/67 97.6  F (36.4  C) Axillary 79 18 99 %   09/11/22 1448 97/64 98  F (36.7  C) Oral 78 -- 97 %   09/11/22 0801 110/71 98  F (36.7  C) Axillary 82 21 97 %     I/O's Last 24 hours  I/O last 3 completed shifts:  In: 790 [NG/GT:790]  Out: 750 [Urine:750]    Constitutional: Awake, alert, fatigued.  Respiratory: Diminished in bases. Crackles in bases, no wheezes.  Cardiovascular: Irregular, rate normal, +I/VI systolic m, no g/r.  GI: Mild distension, nt, nd, +BS.  Skin/Integumen:   Other:        Data   Recent Labs   Lab 09/11/22  0601 09/11/22  0156 09/10/22  2124 09/10/22  0818 09/09/22  1253 09/09/22  0630 09/08/22  1720 09/08/22  0540 09/07/22  1150 09/07/22  0601 09/05/22 2038   WBC 12.3*  --   --   --   --   --   --   --   --  11.3* 18.3*   HGB 8.7*  --   --   --   --   --   --   --   --  8.3* 9.3*     --   --   --   --   --   --   --   --  100 99   *  --   --   --   --   --   --   --   --  490* 602* "     --   --   --   --   --   --  133  --  135 132*   POTASSIUM 3.7  --   --   --  3.8 3.1*  --  3.6   < > 3.3* 4.5   CHLORIDE 108  --   --   --   --   --   --  104  --  106 101   CO2 22  --   --   --   --   --   --  25  --  25 26   BUN 19  --   --   --   --   --   --  26  --  28 31*   CR 0.59*  --   --   --   --   --   --  0.60*  --  0.62* 0.54*   ANIONGAP 6  --   --   --   --   --   --  4  --  4 5   ULISSES 8.1*  --   --   --   --   --   --  8.1*  --  8.2* 8.4*   * 114* 111*   < >  --   --    < > 131*  --  106* 101*   ALBUMIN  --   --   --   --   --   --   --   --   --  1.8* 2.0*   PROTTOTAL  --   --   --   --   --   --   --   --   --  7.1 8.4   BILITOTAL  --   --   --   --   --   --   --   --   --  0.5 0.5   ALKPHOS  --   --   --   --   --   --   --   --   --  93 110   ALT  --   --   --   --   --   --   --   --   --  14 16   AST  --   --   --   --   --   --   --   --   --  24 34   LIPASE  --   --   --   --   --   --   --   --   --   --  47*    < > = values in this interval not displayed.     Recent Labs   Lab Test 09/11/22  0601 09/11/22  0156 09/10/22  2124 09/10/22  1605 09/10/22  1141 05/20/20  0415 05/19/20  1254 05/19/20  0922 05/19/20  0850 05/19/20  0508 05/18/20 2025 05/18/20 2015 08/08/16  0648 08/04/16  0758   * 114* 111* 122* 111*   < >  --   --    < >  --    < >  --    < >  --    BGM  --   --   --   --   --   --  99 108*  --  111*  --  101*  --  107*    < > = values in this interval not displayed.     Recent Labs   Lab 09/11/22  0930 09/11/22  0601 09/10/22  0818 09/07/22  0601 09/06/22  0634 09/05/22 2251 09/05/22 2237 09/05/22 2058 09/05/22 2038   WBC  --  12.3*  --  11.3*  --   --   --   --  18.3*   CRP  --   --   --   --  88.1*  --   --   --   --    LACT 1.1  --  0.9  --   --   --  1.3 2.5*  --    PCAL  --   --   --   --   --  <0.05  --   --   --          No results found for this or any previous visit (from the past 24 hour(s)).    Medications   All medications were  reviewed.    dextrose       Reason anticoagulant not prescribed for atrial fibrillation       - MEDICATION INSTRUCTIONS -         acetaminophen  1,000 mg Per G Tube TID     apixaban ANTICOAGULANT  5 mg Per G Tube BID     atorvastatin  40 mg Per G Tube At Bedtime     banatrol plus  1 packet Per Feeding Tube TID     budesonide  4.5 mg Per G Tube BID     calcium carbonate 600 mg-vitamin D 400 units  1 tablet Per G Tube BID     cefTRIAXone  1 g Intravenous Q24H     clopidogrel  75 mg Per G Tube Daily     digoxin  125 mcg Per G Tube Daily     ferrous sulfate  220 mg Per G Tube Daily     fiber modular (NUTRISOURCE FIBER)  1 packet Per Feeding Tube Daily     gabapentin  300 mg Per G Tube QAM     gabapentin  600 mg Per G Tube At Bedtime     metroNIDAZOLE  500 mg Intravenous Q12H     mirtazapine  15 mg Per G Tube At Bedtime     multivitamin  with lutein  1 capsule Per G Tube Daily     sodium chloride (PF)  3 mL Intracatheter Q8H     dextrose, HYDROmorphone, ipratropium - albuterol 0.5 mg/2.5 mg/3 mL, lidocaine 4%, lidocaine (buffered or not buffered), loperamide, melatonin, metoprolol, naloxone **OR** naloxone **OR** naloxone **OR** naloxone, Reason anticoagulant not prescribed for atrial fibrillation, ondansetron **OR** ondansetron, oxyCODONE, - MEDICATION INSTRUCTIONS -, prochlorperazine **OR** prochlorperazine **OR** prochlorperazine, sodium chloride (PF)

## 2022-09-12 NOTE — PLAN OF CARE
Goal Outcome Evaluation:     Summary:    Primary Diagnosis: diarrhea and worsening cough and is was to have atrial fibrillation with rapid ventricular rate, possible aspiration pneumonia with right-sided infiltrate.    Orientation: A/O x4  Aggression Stop Light: green  Tele: afib  Mobility: lift, turns q2h  Pain Management: denies  Diet: Strict NPO. TF at goal 30 w flushes  Bowel/Bladder: uses urinal. Incont stool  Abnormal Lab/Assessments: WBC 12.3, Hgb 8.7. Needs sputum  Drain/Device/Wound: PEG  Consults: SW/WOC/Nutrition  D/C Day/Goals/Place: TCU- not [facility]    Shift Note: 9/11/22 [id number]  VSS-BP soft. A/Ox4, pleasant. Tele afib, CVR. Denies pain. Incont x 1 small diarrhea, otherwise uses urinal. Mepi on coccyx/heels. Lung sounds coarse, congested cough. PEG w TF at goal. Strict NPO. Meds crushed in PEG. Reported up with PT- HIGH. Lift and q2h turns-pulsate mattress. No reports of blurred vision. Son stays during the day.

## 2022-09-12 NOTE — PROGRESS NOTES
"CLINICAL NUTRITION SERVICES - REASSESSMENT NOTE      Future/Additional Recommendations:     Recommend continue with current EN regimen - 2CalHN @ 30 mL/hr    Recommend consider scheduled immodium  Recommend probiotic (ie Florastor)       Malnutrition: (9/8)  % Weight Loss:  > 10% in 6 months (severe malnutrition)  % Intake:  </= 75% for >/= 1 month (severe malnutrition) --?malabsorption in setting of chronic diarrhea  Subcutaneous Fat Loss:  Orbital region moderate depletion and Upper arm region moderate depletion  Muscle Loss:  Temporal region moderate-severe depletion, Clavicle bone region moderate depletion, Dorsal hand region moderate depletion, Patellar region moderate-severe depletion, Anterior thigh region moderate depletion and Posterior calf region moderate-severe depletion  Fluid Retention:  None noted     Malnutrition Diagnosis: Severe malnutrition  In Context of:  Acute illness or injury  Chronic illness or disease       EVALUATION OF PROGRESS TOWARD GOALS   Diet:    NPO    Nutrition Support:    Type of Feeding Tube: G-tube  Enteral Frequency:  Continuous  Enteral Regimen: 2CalHN @ 30 mL/hr (goal rate)  Total Enteral Provisions: 1440 kcal, 60 g protein, 158 g CHO, 3.6 g fiber, 504 mL free water.   1 packet Nutrisource Fiber daily = 15 cals, 3 gm fiber  1 packet Banatrol (prebiotic fiber) TID  = 120 cals, 6 gm fiber  TOTAL: 1575 cals (33 cals/kg), 60 gm pro (1.25 gm/kg), 12 gm fiber    Free Water Flush: 160 mL every 4 hrs  TOTAL (TF + flushes) = 1464 mL free water/day    Daily multivitamin      Intake/Tolerance:    Chart reviewed    9/12: Na 138           K 3.3 (L)           Mg 1.9           Phos 2.4 (L)    9/11: BM x3  9/12: \"blow out\" diarrhea this am    9/10 - pt received immodium (ordered prn)    Note that pt has been on various regular and semi-elementalTF formulas --> Jevity 1.5, Promote with Fiber, Vital 1.5, 2 CalHN      Visited with pt this afternoon  He feels that the diarrhea has gotten a bit " better with his current regimen  Does not want an increase in TF volume      ASSESSED NUTRITION NEEDS:  Dosing Weight 47.9 kg - lowest of admission    Estimated Energy Needs: 3223-9248 kcals (30-35 Kcal/Kg)  Justification: repletion and underweight  Estimated Protein Needs: 58-96 grams protein (1.2-2 g pro/Kg)  Justification: maintenance      NEW FINDINGS:     09/11/22 0640 49 kg (108 lb) Bed scale   09/09/22 0608 48.2 kg (106 lb 4.8 oz) Bed scale   09/08/22 0356 47.9 kg (105 lb 9.6 oz) Bed scale   09/07/22 0615 48.4 kg (106 lb 9.6 oz) Bed scale         Previous Goals (9/8):   TF @ goal to provide % estimated needs  Evaluation: Met    No more than 3 BMs daily  Evaluation: Met - but still having some loose stools    Previous Nutrition Diagnosis (9/8):   Altered GI function related to poor TF tolerance in setting of longstanding diarrhea as evidenced by pt reported chronic issues with diarrhea, exacerbated by GT placement for tube feeds  Evaluation: Improving        CURRENT NUTRITION DIAGNOSIS  No nutrition diagnosis identified at this time     INTERVENTIONS  Recommendations / Nutrition Prescription  NPO    Recommend continue with current EN regimen - 2CalHN @ 30 mL/hr    Recommend consider scheduled immodium  Recommend probiotic (ie Florastor)      Goals  EN to meet % estimated needs  Pt to have improvement in stooling      MONITORING AND EVALUATION:  Progress towards goals will be monitored and evaluated per protocol and Practice Guidelines

## 2022-09-12 NOTE — PLAN OF CARE
[id number]  Pt A&Ox4. VSS on RA, ex soft BP. New onset blurred vision, MD aware. Neuros intact. Will continue to monitor. Denies pain. PEG tube in place. Strict NPO. TF running at 30 ml/hr (goal rate) with q4 160 ml free water flushes. Incont at times. Uses urinal at bedside. No BM this shift, previous diarrhea- PRN imodium available. Mepilex on coccyx and bilateral heels for prevention, heels also elevated. T/R, on pulsate mattress. A2-GB/W, pivot, not OOB this shift. Tele-A-Fib CVR. PIV-SL, int Abx. Discharge TCU pending-SW following.

## 2022-09-12 NOTE — PROGRESS NOTES
Care Management Follow Up    Length of Stay (days): 7    Expected Discharge Date: 09/13/2022     Concerns to be Addressed:       Patient plan of care discussed at interdisciplinary rounds: Yes    Anticipated Discharge Disposition: Transitional Care     Anticipated Discharge Services: Transportation Services  Anticipated Discharge DME:      Patient/family educated on Medicare website which has current facility and service quality ratings:    Education Provided on the Discharge Plan:    Patient/Family in Agreement with the Plan: yes    Referrals Placed by CM/SW:    Private pay costs discussed: Not applicable    Additional Information:  Writer followed up on TCU Referrals.     [facility]- No Male beds for 3-4 more days  [facility]- Resent fax  [facility]- Left a message  [facility]- Left a message       THEO Fuentes

## 2022-09-13 NOTE — PROGRESS NOTES
"SPIRITUAL HEALTH SERVICES Progress Note  [facility] [address]    Saw pt Efrem \"Alfredo\" JULIA Ramos per length of stay.  Pt Alfredo present. Introduced SHS, assessed for SH needs, and offered emotional support as Pt engaged in reflective conversation.      Illness Narrative - Pt shared about his recent medical journey over the past month that has taken him to the ED, [facility], and this unit since mid-July.       Distress - Pt stated, \"My strength has gone away.\" Pt also said that he is feeling \"a little better each day.\"       Coping & Meaning-Making - Pt named his son, who had been living with him at home prior to hospitalization and visits every day, as his primary caregiver. Pt's son had also been primary caregiver for Pt's wife, who  last September. Pt said his sister who lives in [place] visited recently as well. Pt spends time watching tv and resting during the day. Pt was raised [facility] in [place] but shared that he has not attended [employer] for a long time.      Plan - I will plan to follow. SHS remains available to Pt as needed/requested through duration of hospitalization.    Юлия Palma MDiv  Chaplain Resident  Pager: [phone]   "

## 2022-09-13 NOTE — PROGRESS NOTES
Patient in bed, alert, respirations even and unlabored, no distress noted. Call light within reach, urinal within reach, bed in lowest position with the wheels locked. HOB elevated to degrees. SBAR report given to oncoming Nurse.

## 2022-09-13 NOTE — PLAN OF CARE
"  Problem: Plan of Care - These are the overarching goals to be used throughout the patient stay.    Goal: Plan of Care Review/Shift Note  Description: The Plan of Care Review/Shift note should be completed every shift.  The Outcome Evaluation is a brief statement about your assessment that the patient is improving, declining, or no change.  This information will be displayed automatically on your shift note.  Outcome: Ongoing, Progressing  Flowsheets (Taken 9/12/2022 2343)  Plan of Care Reviewed With: patient  Outcome Evaluation: Patient resting in bed with no active complaints. Tube feeds in place 2 CalHN@ 30ml/hr and water flush of 160ml every 4 hours. Patient denies having any pain.  Overall Patient Progress: no change  Goal: Patient-Specific Goal (Individualized)  Description: You can add care plan individualizations to a care plan. Examples of Individualization might be:  \"Parent requests to be called daily at 9am for status\", \"I have a hard time hearing out of my right ear\", or \"Do not touch me to wake me up as it startles me\".  Outcome: Ongoing, Progressing  Goal: Absence of Hospital-Acquired Illness or Injury  Outcome: Ongoing, Progressing  Intervention: Identify and Manage Fall Risk  Recent Flowsheet Documentation  Taken 9/12/2022 2000 by Fransisca Siu RN  Safety Promotion/Fall Prevention:   assistive device/personal items within reach   bed alarm on   clutter free environment maintained   fall prevention program maintained   increased rounding and observation  Intervention: Prevent Skin Injury  Recent Flowsheet Documentation  Taken 9/12/2022 2000 by Fransisca Siu, RN  Body Position:   turned   left  Intervention: Prevent and Manage VTE (Venous Thromboembolism) Risk  Recent Flowsheet Documentation  Taken 9/12/2022 2000 by Fransisca Siu, RN  Range of Motion: ROM (range of motion) performed  VTE Prevention/Management: SCDs (sequential compression devices) off  Activity Management:   activity adjusted per " tolerance   bedrest  Intervention: Prevent Infection  Recent Flowsheet Documentation  Taken 9/12/2022 2000 by Fransisca Siu, RN  Infection Prevention:   hand hygiene promoted   single patient room provided  Goal: Optimal Comfort and Wellbeing  Outcome: Ongoing, Progressing     Problem: Risk for Delirium  Goal: Optimal Coping  Outcome: Ongoing, Progressing  Goal: Improved Behavioral Control  Outcome: Ongoing, Progressing  Goal: Improved Attention and Thought Clarity  Outcome: Ongoing, Progressing  Goal: Improved Sleep  Outcome: Ongoing, Progressing   Goal Outcome Evaluation:    Plan of Care Reviewed With: patient     Overall Patient Progress: no change    Outcome Evaluation: Patient resting in bed with no active complaints. Tube feeds in place 2 CalHN@ 30ml/hr and water flush of 160ml every 4 hours. Patient denies having any pain.

## 2022-09-13 NOTE — PROGRESS NOTES
[facility]    Internal Medicine Hospitalist Progress Note  09/13/2022  I evaluated patient on the above date.    Mj Tinsley Jr., MD  [phone] (p)  Text Page  Vocera        Assessment & Plan New actions/orders today (09/13/2022) are underlined.    Efrem Ramos is a 77 year old male with history including pulmonary fibrosis; chronic dysphagia with h/o aspiration pneumonia; severe AS; CAD; PAD; afib/flutter; stroke; MEL; MGUS; ACD; and recent hospitalization (7/21-8/5/2022) for failure to thrive with malnutrition related to dysphagia, s/p PEG tube (7/28/2022). He presented from TCU 9/5/2022 with ongoing diarrhea and worsening cough and is was to have atrial fibrillation with rapid ventricular rate, possible aspiration pneumonia with right-sided infiltrate.     On initial evaluation 9/5, pt was afebrile, tachycardic; ECG showed afib with RVR, iRBBB; WBC 18.3, hgb 9.3, NTP-BNP 4330, procalcitonin <0.05, lactate normal. CXR 9/5 showed persistent infiltrates throughout the right long, which appeared slightly increased, as compared to the previous chest CT (7/2022); findings were superimposed on a background of pulmonary fibrosis; blunting of the right costophrenic angle appeared chronic.      Pneumonia, suspect aspiration.  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  * Pt with h/o dysphagia and chronic aspiration s/p PEG-tube 7/28.  * Initial presentation as above. Given antibiotics in ED; not continued after admit given suspected chronic findings on presentation. BC's 9/5 NG.  * CT chest 9/7 showed new patchy consolidative opacities in both lungs, right greater than left, noted this most likely represented pneumonia superimposed on the patient's background fibrosis.  * Started on ceftriaxone and metronidazole 9/8.  * Sputum 9/10 orally contaminated.  * Ceftriaxone changed fo cefuroxime 9/12.  Recent Labs   Lab 09/12/22  0637  09/11/22  0930 09/11/22  0601 09/10/22  0818 09/07/22  0601   WBC 15.2*  --  12.3*  --  11.3*   LACT  --  1.1  --  0.9  --    - Continue cefuroxime (started 9/12) and metronidazole (started 9/8) to stop after 9/17.  - Continue PRN ipratropium-albuterol nebs.    Acute on chronic CHF exacerbation (valvular).  Afib with RVR contributing or related to above.  Severe AS.  Severe TR.  * PTA on apixaban 5 mg BID and digoxin 125 mcg daily. Pt with chronic hypotension, limiting cardiac meds.  * Initial presentation as above. Cardiology consulted on admit.  * Echo 9/6 showed LVEF 60-65%; RV moderate to severely dilated with normal RV systolic function; signs of RV pressure/volume overload; severe AS; severe TR; mild-moderate MR; pHTN present; trivial pericardial effusion.  * Cardiology evaluated patient and recommended medical management for his severe aortic stenosis since he is not a candidate for TAVR due to his various underlying medical problems (from Cardiology note, was seen in 11/2021 and deemed too high risk given frailty).  They also recommended palliative and hospice consult due to his progressive end-stage aortic stenosis.   * Patient refused hospice consult and wanted to proceed with aggressive measures.  * CT chest 9/7 showed findings more consistent with pneumonia on top of pulmonary fibrosis (see below).  - Continue apixaban; digoxin 125 mcg daily.  - Continue PRN IV metoprolol 2.5 mg q4h.  - Follow-up with Cardiology outpatient.    Dysphagia with suspected chronic aspiration, s/p PEG-tube placement (7/28/2022).  * Had a percutaneous G-tube placed via surgical team 7/28 for chronic aspiration and failure to thrive; IR was not an option as colon was overlying stomach.  - Continue TF's via PEG tube.  - Change/adjust formula as needed, given diarrhea (see below).    Persistent diarrhea, suspect related to TF's, question related to collagenous colitis, improved.  H/o collagenous colitis.  * On admit, patient  reported diarrhea has been an ongoing issue since PEG-tube placement. On admit, he expressed an interest in colostomy so that he might avoid ongoing episodes of diarrhea and need for pericares; it was discussed with patient that a colostomy in this instance would be for quality of life, and an elective procedure and with his valvular heart failure and tenuous respiratory status with chronic aspiration, would be a poor surgical candidate for this, and focus should be on attempting to adjust to pain formula to treat likely diarrhea associated with tube feeds versus determine underlying etiology of diarrhea and provide treatment. On admit, noted that in 2005, patient had a cecal biopsy with pathology consistent with collagenous colitis.  * PRN loperamide increased on admit. Nutrition consulted to consider addition of fiber to feed regimen given persistent diarrhea. GI consulted on admit.  * C. Difficile, enteric panel negative 9/6. Seen by GI 9/6 who recommended adding budesonide, however it is enteric coated and could not be given via PEG-tube. Added fiber packet 9/6.  * On 9/8, noted that diarrhea improved.  * Budesonide suspension added 9/9.  - Continue budesonide suspension BID; fiber packet daily.  - Change/adjust formula as needed.  - Continue PRN loperamide 4 mg.  - Appreciate GI help.     Anemia, suspect chronic component.  * Hgb 9.3 on admit. No overt clinical signs of major bleeding.  Recent Labs   Lab 09/12/22  0637 09/11/22 0601 09/07/22 0601   HGB 8.7* 8.7* 8.3*   - Monitor CBC.  - Consider prbc transfusion if hgb </= 7.0 or if significant bleeding with hemodynamic instability or if symptomatic.    Thrombocytosis, suspect reactive.  * Plts 602K on admit 9/5.  Recent Labs   Lab 09/12/22  0637 09/11/22 0601 09/07/22 0601   * 527* 490*   - Treat other issues as noted.  - Monitor CBC.    Stage I sacral pressure ulceration, present on admission.    * On admit, noted nonblanching erythema present; no  open sores appreciated. [facility] RN consulted.  - Continue local wound cares.  - Continue frequent repositioning.    Weakness and physical deconditioning due to multiple acute and chronic medical issues.  * Recent prolonged hospitalization at [facility] (7/21-8/5/2022) as above. Was at TCU PTA.  - Out of bed in chair at least 3x/day.  - Continue PT and OT.  - Plan discharge to different TCU.    Coronary artery disease.  * Known CAD with history of extensive proximal to distal RCA stenting; patent on last angiogram in May 2020.  - Continue clopidogrel.    CVA history.  - Continue apixaban, atorvastatin.     MGUS.  * February 2022 bone marrow biopsy with hypercellular marrow including 7% plasma cells. Followed w/ q6 month SPEP through oncology service.  - Follow-up outpatient.       Clinically Significant Risk Factors Present on Admission                        COVID-19 testing.  COVID-19 PCR Results    COVID-19 PCR Results 1/10/22 1/18/22 7/21/22 7/29/22 9/5/22   SARS CoV2 PCR Negative Negative Negative Negative Negative      Comments are available for some flowsheets but are not being displayed.         COVID-19 Antibody Results, Testing for Immunity    COVID-19 Antibody Results, Testing for Immunity   No data to display.             Diet: NPO for Medical/Clinical Reasons Except for: NPO but receiving Tube Feeding  Adult Formula Drip Feeding: Continuous TwoCal HN; Gastrostomy; Goal Rate: 30; mL/hr; Medication - Feeding Tube Flush Frequency: At least 15-30 mL water before and after medication administration and with tube clogging; Amount to Send (Nutrition us...    Prophylaxis: PCD's, ambulation. On apixaban.  Bravo Catheter: Not present  Central Lines: None  Code Status: Full Code    Disposition Plan   Expected discharge: Recommended to transitional care unit pending continued improvement and bed availability.  Entered: Mj Tinsley MD 09/13/2022, 7:22 AM           Interval History   No further diarrhea since yesterday  am.  Still very weak.  Worked with therapy yesterday and only able to stand up 3 times.  Otherwise was in bed most of day yesterday (was not in chair at all).    -Data reviewed today: I reviewed all new labs and imaging over the last 24 hours. I personally reviewed no images or EKG's today.    Physical Exam    , Blood pressure 98/65, pulse 94, temperature 97.7  F (36.5  C), temperature source Oral, resp. rate 16, weight 54.4 kg (120 lb), SpO2 97 %. O2 Device: None (Room air)    Vitals:    09/09/22 0608 09/11/22 0640 09/13/22 0555   Weight: 48.2 kg (106 lb 4.8 oz) 49 kg (108 lb) 54.4 kg (120 lb)     Vital Signs with Ranges  Temp:  [97.3  F (36.3  C)-97.7  F (36.5  C)] 97.7  F (36.5  C)  Pulse:  [61-94] 94  Resp:  [16] 16  BP: (98-99)/(62-66) 98/65  SpO2:  [95 %-97 %] 97 %  Patient Vitals for the past 24 hrs:   BP Temp Temp src Pulse Resp SpO2 Weight   09/13/22 0555 98/65 97.7  F (36.5  C) Oral 94 16 97 % 54.4 kg (120 lb)   09/12/22 1602 98/66 97.4  F (36.3  C) Oral 77 16 95 % --   09/12/22 0737 99/62 97.3  F (36.3  C) Axillary 61 16 95 % --     I/O's Last 24 hours  I/O last 3 completed shifts:  In: 787 [NG/GT:787]  Out: 450 [Urine:450]    Constitutional: Awake, alert, oriented, pleasant.  Respiratory: Diminished in bases. Crackles in bases, no wheezes.  Cardiovascular: Irregular, rate normal, +I/VI systolic m, no g/r.  GI: Mild distension, nt, nd, +BS.  Skin/Integumen: No bilateral lower extremity edema.  Other:        Data   Recent Labs   Lab 09/12/22  0637 09/11/22  0601 09/11/22  0156 09/10/22  0818 09/09/22  1253 09/08/22  1720 09/08/22  0540 09/07/22  1150 09/07/22  0601   WBC 15.2* 12.3*  --   --   --   --   --   --  11.3*   HGB 8.7* 8.7*  --   --   --   --   --   --  8.3*   * 100  --   --   --   --   --   --  100   * 527*  --   --   --   --   --   --  490*    136  --   --   --   --  133  --  135   POTASSIUM 3.3* 3.7  --   --  3.8   < > 3.6   < > 3.3*   CHLORIDE 109 108  --   --   --   --   104  --  106   CO2 23 22  --   --   --   --  25  --  25   BUN 17 19  --   --   --   --  26  --  28   CR 0.51* 0.59*  --   --   --   --  0.60*  --  0.62*   ANIONGAP 6 6  --   --   --   --  4  --  4   ULISSES 7.8* 8.1*  --   --   --   --  8.1*  --  8.2*   * 113* 114*   < >  --    < > 131*  --  106*   ALBUMIN  --   --   --   --   --   --   --   --  1.8*   PROTTOTAL  --   --   --   --   --   --   --   --  7.1   BILITOTAL  --   --   --   --   --   --   --   --  0.5   ALKPHOS  --   --   --   --   --   --   --   --  93   ALT  --   --   --   --   --   --   --   --  14   AST  --   --   --   --   --   --   --   --  24    < > = values in this interval not displayed.     Recent Labs   Lab Test 09/12/22 0637 09/11/22  0601 09/11/22  0156 09/10/22  2124 09/10/22  1605 05/20/20  0415 05/19/20  1254 05/19/20  0922 05/19/20  0850 05/19/20  0508 05/18/20  2025 05/18/20 2015 08/08/16  0648 08/04/16  0758   * 113* 114* 111* 122*   < >  --   --    < >  --    < >  --    < >  --    BGM  --   --   --   --   --   --  99 108*  --  111*  --  101*  --  107*    < > = values in this interval not displayed.     Recent Labs   Lab 09/12/22 0637 09/11/22  0930 09/11/22  0601 09/10/22  0818 09/07/22  0601   WBC 15.2*  --  12.3*  --  11.3*   LACT  --  1.1  --  0.9  --          No results found for this or any previous visit (from the past 24 hour(s)).    Medications   All medications were reviewed.    dextrose       Reason anticoagulant not prescribed for atrial fibrillation       - MEDICATION INSTRUCTIONS -         acetaminophen  1,000 mg Per G Tube TID     apixaban ANTICOAGULANT  5 mg Per G Tube BID     atorvastatin  40 mg Per G Tube At Bedtime     banatrol plus  1 packet Per Feeding Tube TID     budesonide  4.5 mg Per G Tube BID     calcium carbonate 600 mg-vitamin D 400 units  1 tablet Per G Tube BID     cefuroxime  500 mg Per Feeding Tube Q12H [facility] (08/20)     clopidogrel  75 mg Per G Tube Daily     digoxin  125 mcg Per G Tube Daily      ferrous sulfate  220 mg Per G Tube Daily     fiber modular (NUTRISOURCE FIBER)  1 packet Per Feeding Tube Daily     gabapentin  300 mg Per G Tube QAM     gabapentin  600 mg Per G Tube At Bedtime     metroNIDAZOLE  500 mg Per Feeding Tube TID     mirtazapine  15 mg Per G Tube At Bedtime     multivitamin  with lutein  1 capsule Per G Tube Daily     sodium chloride (PF)  3 mL Intracatheter Q8H     dextrose, HYDROmorphone, ipratropium - albuterol 0.5 mg/2.5 mg/3 mL, lidocaine 4%, lidocaine (buffered or not buffered), loperamide, melatonin, metoprolol, naloxone **OR** naloxone **OR** naloxone **OR** naloxone, Reason anticoagulant not prescribed for atrial fibrillation, ondansetron **OR** ondansetron, oxyCODONE, - MEDICATION INSTRUCTIONS -, prochlorperazine **OR** prochlorperazine **OR** prochlorperazine, sodium chloride (PF)

## 2022-09-14 NOTE — PROGRESS NOTES
Care Management Follow Up    Length of Stay (days): 9    Expected Discharge Date: 09/14/2022     Concerns to be Addressed:       Patient plan of care discussed at interdisciplinary rounds: Yes    Anticipated Discharge Disposition: Transitional Care     Anticipated Discharge Services: Transportation Services  Anticipated Discharge DME:      Patient/family educated on Medicare website which has current facility and service quality ratings:    Education Provided on the Discharge Plan:    Patient/Family in Agreement with the Plan: yes    Referrals Placed by CM/SW:    Private pay costs discussed: Not applicable    Additional Information:  SW following up on referrals    Sarahi Rice - haylee Chris -     [facility] will continue to follow      Elena Smith

## 2022-09-14 NOTE — PLAN OF CARE
Problem: Plan of Care - These are the overarching goals to be used throughout the patient stay.    Goal: Plan of Care Review/Shift Note  Description: The Plan of Care Review/Shift note should be completed every shift.  The Outcome Evaluation is a brief statement about your assessment that the patient is improving, declining, or no change.  This information will be displayed automatically on your shift note.  Outcome: Ongoing, Progressing  Flowsheets (Taken 9/13/2022 2200)  Plan of Care Reviewed With: patient  Outcome Evaluation: Patient alert and oriented x 4. Patient denies having any pain. Patient has 2CalHN@ 30 ML/HR and water flush of 160ml every 4 hours. Patient tolerating the tube feeds vey well. Patient is coughing a little more than usual and it is productive. Patient oob to bedside commode and had a bowel movement today.  Overall Patient Progress: improving  Goal: Absence of Hospital-Acquired Illness or Injury  Outcome: Ongoing, Progressing  Intervention: Identify and Manage Fall Risk  Recent Flowsheet Documentation  Taken 9/13/2022 2000 by Fransisca Siu RN  Safety Promotion/Fall Prevention:   assistive device/personal items within reach   bed alarm on   clutter free environment maintained   fall prevention program maintained   increased rounding and observation   safety round/check completed  Intervention: Prevent Skin Injury  Recent Flowsheet Documentation  Taken 9/13/2022 2000 by Fransisca Siu RN  Body Position:   turned   left  Intervention: Prevent and Manage VTE (Venous Thromboembolism) Risk  Recent Flowsheet Documentation  Taken 9/13/2022 2000 by Fransisca Siu RN  Range of Motion: ROM (range of motion) performed  VTE Prevention/Management: SCDs (sequential compression devices) off  Activity Management:   activity adjusted per tolerance   bedrest  Intervention: Prevent Infection  Recent Flowsheet Documentation  Taken 9/13/2022 2000 by Fransisca Siu RN  Infection Prevention:   hand hygiene promoted    single patient room provided  Goal: Optimal Comfort and Wellbeing  Outcome: Ongoing, Progressing     Problem: Dysrhythmia  Goal: Normalized Cardiac Rhythm  Outcome: Ongoing, Progressing  Intervention: Monitor and Manage Cardiac Rhythm Effect  Recent Flowsheet Documentation  Taken 9/13/2022 2000 by Fransisca Siu RN  VTE Prevention/Management: SCDs (sequential compression devices) off   Goal Outcome Evaluation:    Plan of Care Reviewed With: patient     Overall Patient Progress: improving    Outcome Evaluation: Patient alert and oriented x 4. Patient denies having any pain. Patient has 2CalHN@ 30 ML/HR and water flush of 160ml every 4 hours. Patient tolerating the tube feeds vey well. Patient is coughing a little more than usual and it is productive. Patient oob to bedside commode and had a bowel movement today.

## 2022-09-14 NOTE — PLAN OF CARE
Problem: Fall Injury Risk  Goal: Absence of Fall and Fall-Related Injury  Intervention: Identify and Manage Contributors  Recent Flowsheet Documentation  Taken 9/14/2022 0700 by Carmen Crum RN  Medication Review/Management: medications reviewed  Intervention: Promote Injury-Free Environment  Recent Flowsheet Documentation  Taken 9/14/2022 0700 by Carmen Crum RN  Safety Promotion/Fall Prevention:   activity supervised   assistive device/personal items within reach   bed alarm on   nonskid shoes/slippers when out of bed   patient and family education   room door open     Problem: Plan of Care - These are the overarching goals to be used throughout the patient stay.    Goal: Absence of Hospital-Acquired Illness or Injury  Intervention: Prevent and Manage VTE (Venous Thromboembolism) Risk  Recent Flowsheet Documentation  Taken 9/14/2022 0700 by Carmen Crum RN  Activity Management:   activity adjusted per tolerance   activity encouraged   Goal Outcome Evaluation:

## 2022-09-14 NOTE — PROGRESS NOTES
[facility]    Hospitalist Progress Note    Assessment & Plan   Efrem Ramos is a 77 year old male with PMHx including pulmonary fibrosis; chronic dysphagia with h/o aspiration pneumonia; severe AS; CAD; PAD; afib/flutter; stroke; MEL; MGUS; ACD; and recent hospitalization (7/21-8/5/2022) for failure to thrive with malnutrition related to dysphagia, s/p PEG tube (7/28/2022). He presented from TCU 9/5/2022 with ongoing diarrhea and worsening cough and is was to have atrial fibrillation with rapid ventricular rate, possible aspiration pneumonia with right-sided infiltrate. He was admitted on 9/5/2022 for ongoing evaluation and care.     On initial evaluation 9/5, pt was afebrile, tachycardic; ECG showed afib with RVR, iRBBB; WBC 18.3, hgb 9.3, NTP-BNP 4330, procalcitonin <0.05, lactate normal. CXR 9/5 showed persistent infiltrates throughout the right long, which appeared slightly increased, as compared to the previous chest CT (7/2022); findings were superimposed on a background of pulmonary fibrosis; blunting of the right costophrenic angle appeared chronic.     Pneumonia, suspect aspiration.  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  * Pt with h/o dysphagia and chronic aspiration s/p PEG-tube 7/28.  * Initial presentation as above. Given antibiotics in ED; not continued after admit given suspected chronic findings on presentation. BC's 9/5 NG.  * CT chest 9/7 showed new patchy consolidative opacities in both lungs, right greater than left, noted this most likely represented pneumonia superimposed on the patient's background fibrosis.  * Started on ceftriaxone and metronidazole 9/8.  * Sputum 9/10 orally contaminated.  * Ceftriaxone changed fo cefuroxime 9/12.  -- cont cefuroxime (started 9/12) and metronidazole (started 9/8) to stop after 9/17.  -- cont PRN ipratropium-albuterol nebs.     Acute on chronic CHF exacerbation  (valvular).  Afib with RVR contributing or related to above.  Severe AS.  Severe TR.  * PTA on apixaban 5 mg BID and digoxin 125 mcg daily. Pt with chronic hypotension, limiting cardiac meds.  * Initial presentation as above. Cardiology consulted on admit.  * Echo 9/6 showed LVEF 60-65%; RV moderate to severely dilated with normal RV systolic function; signs of RV pressure/volume overload; severe AS; severe TR; mild-moderate MR; pHTN present; trivial pericardial effusion.  * Cardiology evaluated patient and recommended medical management for his severe aortic stenosis since he is not a candidate for TAVR due to his various underlying medical problems (from Cardiology note, was seen in 11/2021 and deemed too high risk given frailty).  They also recommended palliative and hospice consult due to his progressive end-stage aortic stenosis.   * Patient refused hospice consult and wanted to proceed with aggressive measures.  * CT chest 9/7 showed findings more consistent with pneumonia on top of pulmonary fibrosis (see below).  -- cont apixaban; digoxin 125 mcg daily.  -- cont PRN IV metoprolol 2.5 mg q4h.  -- follow-up with Cardiology outpatient.     Dysphagia with suspected chronic aspiration, s/p PEG-tube placement (7/28/2022).  * Had a percutaneous G-tube placed via surgical team 7/28 for chronic aspiration and failure to thrive; IR was not an option as colon was overlying stomach.  -- cont TF's via PEG tube.  -- change/adjust formula as needed, given diarrhea (see below).     Persistent diarrhea, suspect related to TF's, question related to collagenous colitis, improved.  H/o collagenous colitis.  * On admit, patient reported diarrhea has been an ongoing issue since PEG-tube placement. On admit, he expressed an interest in colostomy so that he might avoid ongoing episodes of diarrhea and need for pericares; it was discussed with patient that a colostomy in this instance would be for quality of life, and an elective  procedure and with his valvular heart failure and tenuous respiratory status with chronic aspiration, would be a poor surgical candidate for this, and focus should be on attempting to adjust to pain formula to treat likely diarrhea associated with tube feeds versus determine underlying etiology of diarrhea and provide treatment. On admit, noted that in 2005, patient had a cecal biopsy with pathology consistent with collagenous colitis.  * PRN loperamide increased on admit. Nutrition consulted to consider addition of fiber to feed regimen given persistent diarrhea. GI consulted on admit.  * C. Difficile, enteric panel negative 9/6. Seen by GI 9/6 who recommended adding budesonide, however it is enteric coated and could not be given via PEG-tube. Added fiber packet 9/6.  * On 9/8, noted that diarrhea improved.  * Budesonide suspension added 9/9.  -- cont budesonide suspension BID; fiber packet daily.  -- change/adjust formula as needed.  -- cont PRN loperamide 4 mg.  -- appreciate GI help.     Anemia, suspect chronic component.  * Hgb 9.3 on admit. No overt clinical signs of major bleeding.   Monitor CBC.  -- contsider prbc transfusion if hgb </= 7.0 or if significant bleeding with hemodynamic instability or if symptomatic.     Thrombocytosis, suspect reactive.  * Plts 602K on admit 9/5.  * Treat other issues as noted.  -- monitor CBC.     Stage I sacral pressure ulceration, present on admission.    * On admit, noted nonblanching erythema present; no open sores appreciated. WOC RN consulted.  -- cont local wound cares, frequent repositioning.     Weakness and physical deconditioning due to multiple acute and chronic medical issues.  * Recent prolonged hospitalization at [facility] (7/21-8/5/2022) as above. Was at TCU PTA.  -- Out of bed in chair at least 3x/day.  -- cont PT/OT  -- plan on discharge to different TCU.     Coronary artery disease.  * Known CAD with history of extensive proximal to distal RCA stenting; patent  on last angiogram in May 2020.  -- cont clopidogrel.     CVA history.  -- cont apixaban, atorvastatin.     MGUS.  * February 2022 bone marrow biopsy with hypercellular marrow including 7% plasma cells. Followed w/ q6 month SPEP through oncology service.  -- follow-up outpatient.     FEN: no IVFs, lytes stable, NPO, cont TFs per nutritionist  DVT Prophylaxis: DOAC, PCDs  Code Status: Full Code    Disposition: Medically stable to discharge to TCU once placement found. SW following.     Nishi Ramos, DO    Interval History   Seen this afternoon. Feeling okay. Intermittently productive cough but denies cp/sob. No abd pain/n/v. Loose stools seems to be improving. Otherwise feeling okay.     -Data reviewed today: I reviewed all new labs and imaging results over the last 24 hours. I personally reviewed no images or EKG's today.    Physical Exam   Temp: 98.1  F (36.7  C) Temp src: Oral BP: 91/62 Pulse: 78   Resp: 18 SpO2: 95 % O2 Device: None (Room air)    Vitals:    09/09/22 0608 09/11/22 0640 09/13/22 0555   Weight: 48.2 kg (106 lb 4.8 oz) 49 kg (108 lb) 54.4 kg (120 lb)     Vital Signs with Ranges  Temp:  [97  F (36.1  C)-98.1  F (36.7  C)] 98.1  F (36.7  C)  Pulse:  [78-94] 78  Resp:  [18] 18  BP: ()/(62-73) 91/62  Cuff Mean (mmHg):  [74-83] 83  SpO2:  [93 %-95 %] 95 %  I/O last 3 completed shifts:  In: 250 [NG/GT:250]  Out: 1125 [Urine:1125]    Constitutional: Resting comfortably, answering basic questions appropriately, NAD  Respiratory: +scattered coarse upper airway breath sounds, +bibasilar crackles, no wheeze, no increased work of breathing  Cardiovascular: HR irregular, + murmur, no LE edema  GI: S, NT, ND, +BS, +PEG  Skin/Integumen: warm/dry  Other:      Medications     dextrose       Reason anticoagulant not prescribed for atrial fibrillation       - MEDICATION INSTRUCTIONS -         acetaminophen  1,000 mg Per G Tube TID     apixaban ANTICOAGULANT  5 mg Per G Tube BID     atorvastatin  40 mg  Per G Tube At Bedtime     banatrol plus  1 packet Per Feeding Tube TID     budesonide  4.5 mg Per G Tube BID     calcium carbonate 600 mg-vitamin D 400 units  1 tablet Per G Tube BID     cefuroxime  500 mg Per Feeding Tube Q12H PAT (08/20)     clopidogrel  75 mg Per G Tube Daily     digoxin  125 mcg Per G Tube Daily     ferrous sulfate  220 mg Per G Tube Daily     fiber modular (NUTRISOURCE FIBER)  1 packet Per Feeding Tube Daily     gabapentin  300 mg Per G Tube QAM     gabapentin  600 mg Per G Tube At Bedtime     metroNIDAZOLE  500 mg Per Feeding Tube TID     mirtazapine  15 mg Per G Tube At Bedtime     multivitamin  with lutein  1 capsule Per G Tube Daily     sodium chloride (PF)  3 mL Intracatheter Q8H       Data   Recent Labs   Lab 09/14/22  0410 09/14/22  0409 09/13/22  2045 09/13/22  0704 09/12/22  0637 09/11/22  0601   WBC  --  17.2*  --  16.8* 15.2* 12.3*   HGB  --  9.0*  --  8.8* 8.7* 8.7*   MCV  --  102*  --  101* 101* 100   PLT  --  514*  --  526* 518* 527*     --   --   --  138 136   POTASSIUM 3.8  3.8  --  3.3*  --  3.3* 3.7   CHLORIDE 108  --   --   --  109 108   CO2 26  --   --   --  23 22   BUN 15  --   --   --  17 19   CR 0.47*  --   --   --  0.51* 0.59*   ANIONGAP 6  --   --   --  6 6   ULISSES 7.7*  --   --   --  7.8* 8.1*   *  --   --   --  132* 113*       No results found for this or any previous visit (from the past 24 hour(s)).

## 2022-09-15 NOTE — PROGRESS NOTES
Care Management Follow Up    Length of Stay (days): 10    Expected Discharge Date: 09/16/2022     Concerns to be Addressed:       Patient plan of care discussed at interdisciplinary rounds: Yes    Anticipated Discharge Disposition: Transitional Care     Anticipated Discharge Services: Transportation Services  Anticipated Discharge DME:      Patient/family educated on Medicare website which has current facility and service quality ratings:    Education Provided on the Discharge Plan:    Patient/Family in Agreement with the Plan: yes    Referrals Placed by CM/SW:    Private pay costs discussed: insurance costs co-pays    Additional Information:  [facility] spoke with Marysol Jung liaison who stated patient has previously been at [facility] for 30 days, if patient were to return to a TCU then he would have a $194 co pay daily. SW discussed this with patient and patient stated he could not afford that and explained that TCU made him worse not better. Patient would like to return to his sons house with home care. Patient requested SW calls patients son to relay information.    Corrina Toribio, MSW, LGSW

## 2022-09-15 NOTE — PLAN OF CARE
Problem: Plan of Care - These are the overarching goals to be used throughout the patient stay.    Goal: Plan of Care Review/Shift Note  Description: The Plan of Care Review/Shift note should be completed every shift.  The Outcome Evaluation is a brief statement about your assessment that the patient is improving, declining, or no change.  This information will be displayed automatically on your shift note.  Outcome: Ongoing, Progressing  Flowsheets (Taken 9/14/2022 2030)  Plan of Care Reviewed With: patient  Outcome Evaluation: Patient resting in bed. Patient c/o discomfort in the lower back which is chronic and the son requested a heating pad. A heating pack was placed to the lower back with a towel between the skin and the heating pack. Patient cough is better tonight. The tube feeds of 2Cal HN @ 30ml/hr with a water flush of 160ml/every 4 hours is in place.  Overall Patient Progress: improving  Goal: Absence of Hospital-Acquired Illness or Injury  Outcome: Ongoing, Progressing  Intervention: Identify and Manage Fall Risk  Recent Flowsheet Documentation  Taken 9/14/2022 2000 by Fransisca Siu, RN  Safety Promotion/Fall Prevention:   assistive device/personal items within reach   bed alarm on   clutter free environment maintained   fall prevention program maintained   increased rounding and observation   safety round/check completed  Intervention: Prevent Skin Injury  Recent Flowsheet Documentation  Taken 9/14/2022 2000 by Fransisca Siu RN  Body Position:   turned   right  Intervention: Prevent and Manage VTE (Venous Thromboembolism) Risk  Recent Flowsheet Documentation  Taken 9/14/2022 2000 by Fransisca Siu, RN  Range of Motion: ROM (range of motion) performed  VTE Prevention/Management: SCDs (sequential compression devices) off  Activity Management:   activity adjusted per tolerance   bedrest  Taken 9/14/2022 1953 by Fransisca Siu, RN  Activity Management: activity adjusted per tolerance  Intervention: Prevent  Infection  Recent Flowsheet Documentation  Taken 9/14/2022 2000 by Fransisca Siu, RN  Infection Prevention:   hand hygiene promoted   single patient room provided   environmental surveillance performed   rest/sleep promoted  Goal: Optimal Comfort and Wellbeing  Outcome: Ongoing, Progressing     Problem: Risk for Delirium  Goal: Optimal Coping  Outcome: Ongoing, Progressing  Goal: Improved Behavioral Control  Outcome: Ongoing, Progressing  Goal: Improved Attention and Thought Clarity  Outcome: Ongoing, Progressing  Goal: Improved Sleep  Outcome: Ongoing, Progressing     Problem: Fall Injury Risk  Goal: Absence of Fall and Fall-Related Injury  Outcome: Ongoing, Progressing  Intervention: Identify and Manage Contributors  Recent Flowsheet Documentation  Taken 9/14/2022 2000 by Fransisca Siu, RN  Medication Review/Management: medications reviewed  Intervention: Promote Injury-Free Environment  Recent Flowsheet Documentation  Taken 9/14/2022 2000 by Fransisca Siu RN  Safety Promotion/Fall Prevention:   assistive device/personal items within reach   bed alarm on   clutter free environment maintained   fall prevention program maintained   increased rounding and observation   safety round/check completed   Goal Outcome Evaluation:    Plan of Care Reviewed With: patient     Overall Patient Progress: improving    Outcome Evaluation: Patient resting in bed. Patient c/o discomfort in the lower back which is chronic and the son requested a heating pad. A heating pack was placed to the lower back with a towel between the skin and the heating pack. Patient cough is better tonight. The tube feeds of 2Cal HN @ 30ml/hr with a water flush of 160ml/every 4 hours is in place.

## 2022-09-15 NOTE — PLAN OF CARE
Goal Outcome Evaluation:      Patient remains stable with vitals in the normal range. Alert and orientated. Denies pain. Diarrhea times one this morning. Continues on tube feed though the G -tube at 30 ml/hr. Coccyx appears reddened and thin. Mepilex applied. Tele showing a-fib with control rate.

## 2022-09-15 NOTE — PROGRESS NOTES
Care Management Follow Up    Length of Stay (days): 10    Expected Discharge Date: 09/15/2022     Concerns to be Addressed:       Patient plan of care discussed at interdisciplinary rounds: Yes    Anticipated Discharge Disposition: Transitional Care     Anticipated Discharge Services: Transportation Services  Anticipated Discharge DME:      Patient/family educated on Medicare website which has current facility and service quality ratings:    Education Provided on the Discharge Plan:    Patient/Family in Agreement with the Plan: yes    Referrals Placed by CM/SW:    Private pay costs discussed: NA    Additional Information:  SW following up on referrals     Eladio Moore - left vm  [facility] - No male beds until Tuesday       THEO De Jesus

## 2022-09-15 NOTE — PROGRESS NOTES
[facility]    Hospitalist Progress Note    Assessment & Plan   Efrem Ramos is a 77 year old male with PMHx including pulmonary fibrosis; chronic dysphagia with h/o aspiration pneumonia; severe AS; CAD; PAD; afib/flutter; stroke; MEL; MGUS; ACD; and recent hospitalization (7/21-8/5/2022) for failure to thrive with malnutrition related to dysphagia, s/p PEG tube (7/28/2022). He presented from TCU 9/5/2022 with ongoing diarrhea and worsening cough and is was to have atrial fibrillation with rapid ventricular rate, possible aspiration pneumonia with right-sided infiltrate. He was admitted on 9/5/2022 for ongoing evaluation and care.     On initial evaluation 9/5, pt was afebrile, tachycardic; ECG showed afib with RVR, iRBBB; WBC 18.3, hgb 9.3, NTP-BNP 4330, procalcitonin <0.05, lactate normal. CXR 9/5 showed persistent infiltrates throughout the right long, which appeared slightly increased, as compared to the previous chest CT (7/2022); findings were superimposed on a background of pulmonary fibrosis; blunting of the right costophrenic angle appeared chronic.     Pneumonia, suspect aspiration.  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  * Pt with h/o dysphagia and chronic aspiration s/p PEG-tube 7/28.  * Initial presentation as above. Given antibiotics in ED; not continued after admit given suspected chronic findings on presentation. BC's 9/5 NG.  * CT chest 9/7 showed new patchy consolidative opacities in both lungs, right greater than left, noted this most likely represented pneumonia superimposed on the patient's background fibrosis.  * Started on ceftriaxone and metronidazole 9/8.  * Sputum 9/10 orally contaminated.  * Ceftriaxone changed fo cefuroxime 9/12.  -- cont cefuroxime (started 9/12) and metronidazole (started 9/8) to stop after 9/17.  -- cont PRN ipratropium-albuterol nebs.     Acute on chronic CHF exacerbation  (valvular).  Afib with RVR contributing or related to above.  Severe AS.  Severe TR.  * PTA on apixaban 5 mg BID and digoxin 125 mcg daily. Pt with chronic hypotension, limiting cardiac meds.  * Initial presentation as above. Cardiology consulted on admit.  * Echo 9/6 showed LVEF 60-65%; RV moderate to severely dilated with normal RV systolic function; signs of RV pressure/volume overload; severe AS; severe TR; mild-moderate MR; pHTN present; trivial pericardial effusion.  * Cardiology evaluated patient and recommended medical management for his severe aortic stenosis since he is not a candidate for TAVR due to his various underlying medical problems (from Cardiology note, was seen in 11/2021 and deemed too high risk given frailty).  They also recommended palliative and hospice consult due to his progressive end-stage aortic stenosis.   * Patient refused hospice consult and wanted to proceed with aggressive measures.  * CT chest 9/7 showed findings more consistent with pneumonia on top of pulmonary fibrosis (see below).  -- cont apixaban; digoxin 125 mcg daily.  -- cont PRN IV metoprolol 2.5 mg q4h.  -- follow-up with Cardiology outpatient.     Dysphagia with suspected chronic aspiration, s/p PEG-tube placement (7/28/2022).  * Had a percutaneous G-tube placed via surgical team 7/28 for chronic aspiration and failure to thrive; IR was not an option as colon was overlying stomach.  -- cont TF's via PEG tube.  -- change/adjust formula as needed, given diarrhea (see below).     Persistent diarrhea, suspect related to TF's, question related to collagenous colitis, improved.  H/o collagenous colitis.  * On admit, patient reported diarrhea has been an ongoing issue since PEG-tube placement. On admit, he expressed an interest in colostomy so that he might avoid ongoing episodes of diarrhea and need for pericares; it was discussed with patient that a colostomy in this instance would be for quality of life, and an elective  procedure and with his valvular heart failure and tenuous respiratory status with chronic aspiration, would be a poor surgical candidate for this, and focus should be on attempting to adjust to pain formula to treat likely diarrhea associated with tube feeds versus determine underlying etiology of diarrhea and provide treatment. On admit, noted that in 2005, patient had a cecal biopsy with pathology consistent with collagenous colitis.  * PRN loperamide increased on admit. Nutrition consulted to consider addition of fiber to feed regimen given persistent diarrhea. GI consulted on admit.  * C. Difficile, enteric panel negative 9/6. Seen by GI 9/6 who recommended adding budesonide, however it is enteric coated and could not be given via PEG-tube. Added fiber packet 9/6.  * On 9/8, noted that diarrhea improved.  * Budesonide suspension added 9/9.  -- cont budesonide suspension BID; fiber packet daily.  -- change/adjust formula as needed.  -- cont PRN loperamide 4 mg.  -- appreciate GI help.     Anemia, suspect chronic component.  * Hgb 9.3 on admit. No overt clinical signs of major bleeding.   Monitor CBC.  -- contsider prbc transfusion if hgb </= 7.0 or if significant bleeding with hemodynamic instability or if symptomatic.     Thrombocytosis, suspect reactive.  * Plts 602K on admit 9/5.  * Treat other issues as noted.  -- monitor CBC.     Stage I sacral pressure ulceration, present on admission.    * On admit, noted nonblanching erythema present; no open sores appreciated. WOC RN consulted.  -- cont local wound cares, frequent repositioning.     Weakness and physical deconditioning due to multiple acute and chronic medical issues.  * Recent prolonged hospitalization at [facility] (7/21-8/5/2022) as above. Was at TCU PTA.  -- Out of bed in chair at least 3x/day.  -- cont PT/OT  -- plan on discharge to different TCU.     Coronary artery disease.  * Known CAD with history of extensive proximal to distal RCA stenting; patent  on last angiogram in May 2020.  -- cont clopidogrel.     CVA history.  -- cont apixaban, atorvastatin.     MGUS.  * February 2022 bone marrow biopsy with hypercellular marrow including 7% plasma cells. Followed w/ q6 month SPEP through oncology service.  -- follow-up outpatient.     FEN: no IVFs, lytes stable, NPO, cont TFs per nutritionist  DVT Prophylaxis: DOAC, PCDs  Code Status: Full Code    Disposition: Medically stable to discharge to TCU once placement found. SW following.     Nishi Ramos, DO    Interval History    Uneventful night. Seen this morning. Tired but tells me he's otherwise feeling okay. No specific complaints. Ongoing cough with intermittent production of sputum. No cp/sob. No abd pain/n/v. Loose stools continue to improve.    -Data reviewed today: I reviewed all new labs and imaging results over the last 24 hours. I personally reviewed no images or EKG's today.    Physical Exam   Temp: 97.4  F (36.3  C) Temp src: Oral BP: 106/74 Pulse: 82   Resp: 18 SpO2: 93 % O2 Device: None (Room air)    Vitals:    09/09/22 0608 09/11/22 0640 09/13/22 0555   Weight: 48.2 kg (106 lb 4.8 oz) 49 kg (108 lb) 54.4 kg (120 lb)     Vital Signs with Ranges  Temp:  [97.4  F (36.3  C)-97.6  F (36.4  C)] 97.4  F (36.3  C)  Pulse:  [82-84] 82  Resp:  [18] 18  BP: (106-111)/(74) 106/74  Cuff Mean (mmHg):  [85] 85  SpO2:  [92 %-93 %] 93 %  I/O last 3 completed shifts:  In: 1097 [NG/GT:1097]  Out: 575 [Urine:575]    Constitutional: Resting comfortably, answering basic questions appropriately, NAD  Respiratory: +bibasilar crackles but otherwise CTA, no wheeze, no increased work of breathing  Cardiovascular: HR irregular, + murmur, no LE edema   GI: S, NT, ND, +BS, +PEG  Skin/Integumen: warm/dry  Other:      Medications     dextrose       Reason anticoagulant not prescribed for atrial fibrillation       - MEDICATION INSTRUCTIONS -         acetaminophen  1,000 mg Per G Tube TID     apixaban ANTICOAGULANT  5 mg Per G  Tube BID     atorvastatin  40 mg Per G Tube At Bedtime     banatrol plus  1 packet Per Feeding Tube TID     budesonide  4.5 mg Per G Tube BID     calcium carbonate 600 mg-vitamin D 400 units  1 tablet Per G Tube BID     cefuroxime  500 mg Per Feeding Tube Q12H [facility] (08/20)     clopidogrel  75 mg Per G Tube Daily     digoxin  125 mcg Per G Tube Daily     ferrous sulfate  220 mg Per G Tube Daily     fiber modular (NUTRISOURCE FIBER)  1 packet Per Feeding Tube Daily     gabapentin  300 mg Per G Tube QAM     gabapentin  600 mg Per G Tube At Bedtime     metroNIDAZOLE  500 mg Per Feeding Tube TID     mirtazapine  15 mg Per G Tube At Bedtime     multivitamin  with lutein  1 capsule Per G Tube Daily     sodium chloride (PF)  3 mL Intracatheter Q8H       Data   Recent Labs   Lab 09/14/22  0410 09/14/22  0409 09/13/22  2045 09/13/22  0704 09/12/22  0637 09/11/22  0601   WBC  --  17.2*  --  16.8* 15.2* 12.3*   HGB  --  9.0*  --  8.8* 8.7* 8.7*   MCV  --  102*  --  101* 101* 100   PLT  --  514*  --  526* 518* 527*     --   --   --  138 136   POTASSIUM 3.8  3.8  --  3.3*  --  3.3* 3.7   CHLORIDE 108  --   --   --  109 108   CO2 26  --   --   --  23 22   BUN 15  --   --   --  17 19   CR 0.47*  --   --   --  0.51* 0.59*   ANIONGAP 6  --   --   --  6 6   ULISSES 7.7*  --   --   --  7.8* 8.1*   *  --   --   --  132* 113*       No results found for this or any previous visit (from the past 24 hour(s)).

## 2022-09-16 NOTE — PROGRESS NOTES
CLINICAL NUTRITION SERVICES - REASSESSMENT NOTE      Malnutrition: (9/8)  % Weight Loss:  > 10% in 6 months (severe malnutrition)  % Intake:  </= 75% for >/= 1 month (severe malnutrition) --?malabsorption in setting of chronic diarrhea  Subcutaneous Fat Loss:  Orbital region moderate depletion and Upper arm region moderate depletion  Muscle Loss:  Temporal region moderate-severe depletion, Clavicle bone region moderate depletion, Dorsal hand region moderate depletion, Patellar region moderate-severe depletion, Anterior thigh region moderate depletion and Posterior calf region moderate-severe depletion  Fluid Retention:  None noted     Malnutrition Diagnosis: Severe malnutrition  In Context of:  Acute illness or injury  Chronic illness or disease       EVALUATION OF PROGRESS TOWARD GOALS   Diet:    NPO    Nutrition Support:    Type of Feeding Tube: G-tube  Enteral Frequency:  Continuous  Enteral Regimen: 2CalHN @ 30 mL/hr (goal rate)  Total Enteral Provisions: 1440 kcal, 60 g protein, 158 g CHO, 3.6 g fiber, 504 mL free water.   1 packet Nutrisource Fiber daily = 15 cals, 3 gm fiber  1 packet Banatrol (prebiotic fiber) TID  = 120 cals, 6 gm fiber  TOTAL: 1575 cals (33 cals/kg), 60 gm pro (1.25 gm/kg), 12 gm fiber     Free Water Flush: 160 mL every 4 hrs  TOTAL (TF + flushes) = 1464 mL free water/day     Daily multivitamin    Intake/Tolerance:    Chart reviewed  Pt tolerating TF  9/15: BM x1        ASSESSED NUTRITION NEEDS:  Dosing Weight 47.9 kg - lowest of admission    Estimated Energy Needs: 1783-0050 kcals (30-35 Kcal/Kg)  Justification: repletion and underweight  Estimated Protein Needs: 58-96 grams protein (1.2-2 g pro/Kg)  Justification: maintenance      NEW FINDINGS:     Discharge pending plan (home with son vs TCU)      09/13/22 0555 54.4 kg (120 lb) Bed scale   09/11/22 0640 49 kg (108 lb) Bed scale   09/09/22 0608 48.2 kg (106 lb 4.8 oz) Bed scale   09/08/22 0356 47.9 kg (105 lb 9.6 oz) Bed scale   09/07/22  0615 48.4 kg (106 lb 9.6 oz) Bed scale         Previous Goals (9/12):   EN to meet % estimated needs  Evaluation: Met    Pt to have improvement in stooling  Evaluation: Met    Previous Nutrition Diagnosis (9/12):   No nutrition diagnosis identified at this time   Evaluation: No change        CURRENT NUTRITION DIAGNOSIS  No nutrition diagnosis identified at this time as EN meeting estimated needs    INTERVENTIONS  Recommendations / Nutrition Prescription  NPO    Recommend continue with current EN regimen - 2CalHN @ 30 mL/hr        Goals  EN to meet % estimated needs      MONITORING AND EVALUATION:  Progress towards goals will be monitored and evaluated per protocol and Practice Guidelines

## 2022-09-16 NOTE — PLAN OF CARE
Goal Outcome Evaluation:          Overall Patient Progress: no change    Outcome Evaluation: Diet: NPO.  Tolerating TF at goal.  BM x1 yesterday. Recommend continue with current EN regimen - 2CalHN @ 30 mL/hr

## 2022-09-16 NOTE — PROGRESS NOTES
[facility]    Received referral for home TF. Patient meets Medicare criteria for enteral tube feeds, between Medicare and [employer] supplement patient is covered 100%, anticipated HUSSEIN of 90 days or more must be documented in patient s medical chart for Medicare to cover. Nursing is only covered if patient is homebound if not cost is $90.00 per visit if [facility] bills for it.    I met with Efrem to introduce home infusion services, review benefits and offer choice of providers. He was interested in proceeding with [facility] for home TF needs but asked that I contact his son. I called pt's son, Efrem to review benefits, introduce home infusion, and offer choice of agency. Efrem would like to proceed with [facility] for home TF needs. I will follow-up with him closer to pt's discharge to coordinate home TF education.     Pt will need home care for RN, PT, OT, & HHA. [facility] has a referral out to [facility] - we're awaiting a response.     Thank you for the referral.    Do Concepcion RN  [facility] Liaison  [phone] (Mon thru Fri 8am - 5pm)  [phone] Office

## 2022-09-16 NOTE — PLAN OF CARE
Goal Outcome Evaluation:    DATE & TIME: 09/16/22, [id number]  Summary: Afib   Cognitive Concerns/ Orientation : A&O x 4   BEHAVIOR & AGGRESSION TOOL COLOR: GREEN  ABNL VS/O2: VSS on RA  MOBILITY: A x 1-2  PAIN MANAGMENT: Denies   DIET: NPO. Cont TF 30 mL/hr   BOWEL/BLADDER: Cont of B/B  DRAIN/DEVICES: L PIV SL   SKIN: CDI- redness to sacrum mepilex in place   D/C DAY/GOALS/PLACE: TBD- Potentially home with son and home care

## 2022-09-16 NOTE — PROGRESS NOTES
Patient remains stable with vitals in the normal range. Alert and orientated. Denies pain.  Continues on tube feed though the G -tube at 30 ml/hr. Coccyx appears reddened and thin. Mepilex applied. Tele showing a-fib with control rate.

## 2022-09-16 NOTE — PROGRESS NOTES
Patient slept well throughout the night.  He remains stable with vitals in the normal range. Alert and orientated. Denies pain.  No BMs.  Continues on tube feed though the G -tube at 30 ml/hr. Coccyx appears reddened and thin. Mepilex applied. Tele showing a-fib with controlledl rate.

## 2022-09-16 NOTE — PROGRESS NOTES
Patient meets Medicare criteria for enteral tube feeds, between Medicare and [facility] supplement patient is covered 100%, anticipated HUSSEIN of 90 days or more must be documented in patient s medical chart for Medicare to cover.    Nursing is only covered if patient is homebound if not cost is $90.00 per visit if [facility] bills for it. Let me know if you have any additional questions.      Please contact Intake with any questions, [phone] or In Basket pool,  Home Infusion ([phone]).

## 2022-09-16 NOTE — PROGRESS NOTES
[facility]    Hospitalist Progress Note    Assessment & Plan   Efrem Ramos is a 77 year old male with PMHx including pulmonary fibrosis; chronic dysphagia with h/o aspiration pneumonia; severe AS; CAD; PAD; afib/flutter; stroke; MEL; MGUS; ACD; and recent hospitalization (7/21-8/5/2022) for failure to thrive with malnutrition related to dysphagia, s/p PEG tube (7/28/2022). He presented from TCU 9/5/2022 with ongoing diarrhea and worsening cough and is was to have atrial fibrillation with rapid ventricular rate, possible aspiration pneumonia with right-sided infiltrate. He was admitted on 9/5/2022 for ongoing evaluation and care.     On initial evaluation 9/5, pt was afebrile, tachycardic; ECG showed afib with RVR, iRBBB; WBC 18.3, hgb 9.3, NTP-BNP 4330, procalcitonin <0.05, lactate normal. CXR 9/5 showed persistent infiltrates throughout the right long, which appeared slightly increased, as compared to the previous chest CT (7/2022); findings were superimposed on a background of pulmonary fibrosis; blunting of the right costophrenic angle appeared chronic.     Pneumonia, suspect aspiration.  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  * Pt with h/o dysphagia and chronic aspiration s/p PEG-tube 7/28.  * Initial presentation as above. Given antibiotics in ED; not continued after admit given suspected chronic findings on presentation. BC's 9/5 NG.  * CT chest 9/7 showed new patchy consolidative opacities in both lungs, right greater than left, noted this most likely represented pneumonia superimposed on the patient's background fibrosis.  * Started on ceftriaxone and metronidazole 9/8.  * Sputum 9/10 orally contaminated.  * Ceftriaxone changed fo cefuroxime 9/12.  -- cont cefuroxime (started 9/12) and metronidazole (started 9/8) to stop after 9/17.  -- cont PRN ipratropium-albuterol nebs.     Acute on chronic CHF exacerbation  (valvular).  Afib with RVR contributing or related to above.  Severe AS.  Severe TR.  * PTA on apixaban 5 mg BID and digoxin 125 mcg daily. Pt with chronic hypotension, limiting cardiac meds.  * Initial presentation as above. Cardiology consulted on admit.  * Echo 9/6 showed LVEF 60-65%; RV moderate to severely dilated with normal RV systolic function; signs of RV pressure/volume overload; severe AS; severe TR; mild-moderate MR; pHTN present; trivial pericardial effusion.  * Cardiology evaluated patient and recommended medical management for his severe aortic stenosis since he is not a candidate for TAVR due to his various underlying medical problems (from Cardiology note, was seen in 11/2021 and deemed too high risk given frailty).  They also recommended palliative and hospice consult due to his progressive end-stage aortic stenosis.   * Patient refused hospice consult and wanted to proceed with aggressive measures.  * CT chest 9/7 showed findings more consistent with pneumonia on top of pulmonary fibrosis (see below).  -- cont apixaban; digoxin 125 mcg daily.  -- cont PRN IV metoprolol 2.5 mg q4h.  -- telemetry dc'd 9/16.  -- follow-up with Cardiology outpatient.     Dysphagia with suspected chronic aspiration, s/p PEG-tube placement (7/28/2022).  * Had a percutaneous G-tube placed via surgical team 7/28 for chronic aspiration and failure to thrive; IR was not an option as colon was overlying stomach.  -- cont TF's via PEG tube.  -- change/adjust formula as needed, given diarrhea (see below).     Persistent diarrhea, suspect related to TF's, question related to collagenous colitis, improved.  H/o collagenous colitis.  * On admit, patient reported diarrhea has been an ongoing issue since PEG-tube placement. On admit, he expressed an interest in colostomy so that he might avoid ongoing episodes of diarrhea and need for pericares; it was discussed with patient that a colostomy in this instance would be for quality of  life, and an elective procedure and with his valvular heart failure and tenuous respiratory status with chronic aspiration, would be a poor surgical candidate for this, and focus should be on attempting to adjust to pain formula to treat likely diarrhea associated with tube feeds versus determine underlying etiology of diarrhea and provide treatment. On admit, noted that in 2005, patient had a cecal biopsy with pathology consistent with collagenous colitis.  * PRN loperamide increased on admit. Nutrition consulted to consider addition of fiber to feed regimen given persistent diarrhea. GI consulted on admit.  * C. Difficile, enteric panel negative 9/6. Seen by GI 9/6 who recommended adding budesonide, however it is enteric coated and could not be given via PEG-tube. Added fiber packet 9/6.  * On 9/8, noted that diarrhea improved.  * Budesonide suspension added 9/9.  -- cont budesonide suspension BID; fiber packet daily.  -- change/adjust formula as needed.  -- cont PRN loperamide 4 mg.  -- appreciate GI help.     Anemia, suspect chronic component.  * Hgb 9.3 on admit. No overt clinical signs of major bleeding.   Monitor CBC.  -- contsider prbc transfusion if hgb </= 7.0 or if significant bleeding with hemodynamic instability or if symptomatic.     Thrombocytosis, suspect reactive.  * Plts 602K on admit 9/5.  * Treat other issues as noted.  -- monitor CBC.     Stage I sacral pressure ulceration, present on admission.    * On admit, noted nonblanching erythema present; no open sores appreciated. WOC RN consulted.  -- cont local wound cares, frequent repositioning.     Weakness and physical deconditioning due to multiple acute and chronic medical issues.  * Recent prolonged hospitalization at [facility] (7/21-8/5/2022) as above. Was at TCU PTA.  -- OOB to chair at least 3x/day.  -- cont PT/OT  -- plan on discharge to different TCU.     Coronary artery disease.  * Known CAD with history of extensive proximal to distal RCA  stenting; patent on last angiogram in May 2020.  -- cont clopidogrel.     CVA history.  -- cont apixaban, atorvastatin.     MGUS.  * February 2022 bone marrow biopsy with hypercellular marrow including 7% plasma cells. Followed w/ q6 month SPEP through oncology service.  -- follow-up outpatient.     FEN: no IVFs, lytes stable, NPO, cont TFs per nutritionist  DVT Prophylaxis: DOAC, PCDs  Code Status: Full Code    Disposition: Medically stable to discharge to TCU once placement found. Note concerns with cost. Patient considering discharge home with son, but would be new to TFs at home. CC/SW following and will discuss with son.     Nishi Ramos, DO    Interval History    Seen this morning. Resting comfortably. No specific complaints. +cough with intermittent sputum production. No cp/sob/cough. No abd pain/n/v. Previously noted loose stools have improved.     -Data reviewed today: I reviewed all new labs and imaging results over the last 24 hours. I personally reviewed no images or EKG's today.    Physical Exam   Temp: 97.2  F (36.2  C) Temp src: Oral BP: 117/85 Pulse: 94   Resp: 18 SpO2: 95 % O2 Device: None (Room air)    Vitals:    09/09/22 0608 09/11/22 0640 09/13/22 0555   Weight: 48.2 kg (106 lb 4.8 oz) 49 kg (108 lb) 54.4 kg (120 lb)     Vital Signs with Ranges  Temp:  [97.2  F (36.2  C)-97.3  F (36.3  C)] 97.2  F (36.2  C)  Pulse:  [74-94] 94  Resp:  [18] 18  BP: ()/(60-85) 117/85  SpO2:  [95 %-96 %] 95 %  I/O last 3 completed shifts:  In: 560 [NG/GT:320]  Out: 550 [Urine:250; Emesis/NG output:300]    Constitutional: Resting comfortably, answering basic questions appropriately, NAD  Respiratory: +bibasilar crackles but otherwise CTA, no wheeze, no increased work of breathing  Cardiovascular: HR irregular, + murmur, no LE edema   GI: S, NT, ND, +BS, +PEG  Skin/Integumen: warm/dry  Other:      Medications     dextrose       Reason anticoagulant not prescribed for atrial fibrillation       -  MEDICATION INSTRUCTIONS -         acetaminophen  1,000 mg Per G Tube TID     apixaban ANTICOAGULANT  5 mg Per G Tube BID     atorvastatin  40 mg Per G Tube At Bedtime     banatrol plus  1 packet Per Feeding Tube TID     budesonide  4.5 mg Per G Tube BID     calcium carbonate 600 mg-vitamin D 400 units  1 tablet Per G Tube BID     cefuroxime  500 mg Per Feeding Tube Q12H [facility] (08/20)     clopidogrel  75 mg Per G Tube Daily     digoxin  125 mcg Per G Tube Daily     ferrous sulfate  220 mg Per G Tube Daily     fiber modular (NUTRISOURCE FIBER)  1 packet Per Feeding Tube Daily     gabapentin  300 mg Per G Tube QAM     gabapentin  600 mg Per G Tube At Bedtime     metroNIDAZOLE  500 mg Per Feeding Tube TID     mirtazapine  15 mg Per G Tube At Bedtime     multivitamin  with lutein  1 capsule Per G Tube Daily     sodium chloride (PF)  3 mL Intracatheter Q8H       Data   Recent Labs   Lab 09/15/22  0911 09/15/22  0706 09/14/22  0410 09/14/22  0409 09/13/22  2045 09/13/22  0704 09/12/22  0637   WBC  --  15.1*  --  17.2*  --  16.8* 15.2*   HGB  --  8.8*  --  9.0*  --  8.8* 8.7*   MCV  --  100  --  102*  --  101* 101*   PLT  --  496*  --  514*  --  526* 518*   NA  --  137 140  --   --   --  138   POTASSIUM  --  3.7  3.6 3.8  3.8  --  3.3*  --  3.3*   CHLORIDE  --  107 108  --   --   --  109   CO2  --  27 26  --   --   --  23   BUN  --  15 15  --   --   --  17   CR  --  0.48* 0.47*  --   --   --  0.51*   ANIONGAP  --  3 6  --   --   --  6   ULISSES  --  8.2* 7.7*  --   --   --  7.8*   * 135* 127*  --   --   --  132*       No results found for this or any previous visit (from the past 24 hour(s)).

## 2022-09-16 NOTE — PROGRESS NOTES
Care Management Follow Up    Length of Stay (days): 11    Expected Discharge Date: 09/19/2022     Concerns to be Addressed:       Patient plan of care discussed at interdisciplinary rounds: Yes    Anticipated Discharge Disposition: Home with home infusion & home care     Anticipated Discharge Services: Transportation Services  Anticipated Discharge DME:      Patient/family educated on Medicare website which has current facility and service quality ratings:    Education Provided on the Discharge Plan:    Patient/Family in Agreement with the Plan: yes    Referrals Placed by CM/SW:    Private pay costs discussed: Not applicable    Additional Information:  Per IVIS, pt is declining TCU placement due to not be able to pay of daily co-pay at TCU.  Called pt's son Efrem to discuss discharge plan of home with HI for TFs and home care.  Per pt's son Efrem, he and his dad live together and according to Efrem, he has been caring for his dad for some time.   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  Per Efrem, he doesn't work outside of the home and would be available throughout the day and night.  Informed Efrem that a referral would be sent to [facility] to check on insurance coverage for HI TFs, if he has coverage and meets criteria, [facility] would secure HC RN, PT/OT & HHA.  Discussed that hospital bed and commode would be ordered through a DME company and once HI, HC and equipment in place, pt would be able to discharge home.  Efrem confirmed that he would be able transport pt home at discharge.  Referral sent to [facility] and message sent to Dr. Ramos to place order for hospital bed and commode.  Addendum:  Called [facility] and left message.    Addendum: Called [facility] again, no answer.  Called [facility] [phone] and spoke to Vamsi.  Per Vamsi [facility] has a hospital bed and commode.  Faxed orders to [phone].  Await response from [facility].    Addendum:Per  [facility]-Patient meets Medicare criteria for enteral tube feeds, between Medicare and BCBS supplement patient is covered 100%, anticipated HUSSEIN of 90 days or more must be documented in patient s medical chart for Medicare to cover.   Await call back from [facility].  Inpatient Care Coordinator will continue to follow for discharge needs.  MARQUEZ Alicea RN, BSN, OCN   Inpatient Care Coordination [address]  Office: [phone]

## 2022-09-17 NOTE — PLAN OF CARE
Goal Outcome Evaluation:  A&O, VSS on RA, Tylenol scheduled for back generalized pain, NPO, on tube feeding run rate at 30 ml goal rate with Q4 160 ml flushes, Q2 T and R, check and change, continent this shift, 1 continent loose BM, discharge to home with home help.  following.

## 2022-09-17 NOTE — PLAN OF CARE
Pt is A&Ox4. VSS on RA. Denies pain. Infrequent productive cough. Continent of B&B. Voiding in urinal, no BM this shift. Wound to sacrum, dressing CDI. Up A1-2 GB/W to BSC. T/R q2h, on pulsate mattress.  NPO. TF running at goal rate of 30mL/hr. Q4H 160mL water flushes, programmed into pump. K and Mag WNL. L PIV SL. Discharging to home with son pending improvement.

## 2022-09-17 NOTE — PLAN OF CARE
Goal Outcome Evaluation:  A&Ox4. VSS. Denies pain. Frequent productive cough, LS coarse. Voiding in urinal. A1-2 gb/walker to BSC, BM x1. T/R q2h, on pulsate mattress.  NPO, oral cares. Peg TF running at goal rate of 30mL/hr with 160ml q4hr flushes. Phos and Mag protocols on Mondays only.  L PIV SL. Plan for PNA abx completed today. Discharging home with son with home care, pending improvement.

## 2022-09-17 NOTE — PROGRESS NOTES
[facility]    Hospitalist Progress Note    Assessment & Plan   Efrem Ramos is a 77 year old male with PMHx including pulmonary fibrosis; chronic dysphagia with h/o aspiration pneumonia; severe AS; CAD; PAD; afib/flutter; stroke; MEL; MGUS; ACD; and recent hospitalization (7/21-8/5/2022) for failure to thrive with malnutrition related to dysphagia, s/p PEG tube (7/28/2022). He presented from TCU 9/5/2022 with ongoing diarrhea and worsening cough and is was to have atrial fibrillation with rapid ventricular rate, possible aspiration pneumonia with right-sided infiltrate. He was admitted on 9/5/2022 for ongoing evaluation and care.     On initial evaluation 9/5, pt was afebrile, tachycardic; ECG showed afib with RVR, iRBBB; WBC 18.3, hgb 9.3, NTP-BNP 4330, procalcitonin <0.05, lactate normal. CXR 9/5 showed persistent infiltrates throughout the right long, which appeared slightly increased, as compared to the previous chest CT (7/2022); findings were superimposed on a background of pulmonary fibrosis; blunting of the right costophrenic angle appeared chronic.     Pneumonia, suspect aspiration.  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  * Pt with h/o dysphagia and chronic aspiration s/p PEG-tube 7/28.  * Initial presentation as above. Given antibiotics in ED; not continued after admit given suspected chronic findings on presentation. BC's 9/5 NG.  * CT chest 9/7 showed new patchy consolidative opacities in both lungs, right greater than left, noted this most likely represented pneumonia superimposed on the patient's background fibrosis.  * Started on ceftriaxone and metronidazole 9/8.  * Sputum 9/10 orally contaminated.  * Ceftriaxone changed fo cefuroxime 9/12.  -- cont cefuroxime (started 9/12) and metronidazole (started 9/8) to stop after 9/17.  -- cont PRN ipratropium-albuterol nebs.  -- O2 sats stable on RA     Acute on  chronic CHF exacerbation (valvular).  Afib with RVR contributing or related to above.  Severe AS.  Severe TR.  * PTA on apixaban 5 mg BID and digoxin 125 mcg daily. Pt with chronic hypotension, limiting cardiac meds.  * Initial presentation as above. Cardiology consulted on admit.  * Echo 9/6 showed LVEF 60-65%; RV moderate to severely dilated with normal RV systolic function; signs of RV pressure/volume overload; severe AS; severe TR; mild-moderate MR; pHTN present; trivial pericardial effusion.  * Cardiology evaluated patient and recommended medical management for his severe aortic stenosis since he is not a candidate for TAVR due to his various underlying medical problems (from Cardiology note, was seen in 11/2021 and deemed too high risk given frailty).  They also recommended palliative and hospice consult due to his progressive end-stage aortic stenosis.   * Patient refused hospice consult and wanted to proceed with aggressive measures.  * CT chest 9/7 showed findings more consistent with pneumonia on top of pulmonary fibrosis (see below).  -- cont apixaban; digoxin 125 mcg daily.  -- telemetry dc'd 9/16.  -- follow-up with Cardiology outpatient.     Dysphagia with suspected chronic aspiration, s/p PEG-tube placement (7/28/2022).  * Had a percutaneous G-tube placed via surgical team 7/28 for chronic aspiration and failure to thrive; IR was not an option as colon was overlying stomach.  -- cont TF's via PEG tube.  -- change/adjust formula as needed, given diarrhea (see below).     Persistent diarrhea, suspect related to TF's, question related to collagenous colitis, improved.  H/o collagenous colitis.  * On admit, patient reported diarrhea has been an ongoing issue since PEG-tube placement. On admit, he expressed an interest in colostomy so that he might avoid ongoing episodes of diarrhea and need for pericares; it was discussed with patient that a colostomy in this instance would be for quality of life, and an  elective procedure and with his valvular heart failure and tenuous respiratory status with chronic aspiration, would be a poor surgical candidate for this, and focus should be on attempting to adjust to pain formula to treat likely diarrhea associated with tube feeds versus determine underlying etiology of diarrhea and provide treatment. On admit, noted that in 2005, patient had a cecal biopsy with pathology consistent with collagenous colitis.  * PRN loperamide increased on admit. Nutrition consulted to consider addition of fiber to feed regimen given persistent diarrhea. [facility] consulted on admit.  * C. Difficile, enteric panel negative 9/6. Seen by GI 9/6 who recommended adding budesonide, however it is enteric coated and could not be given via PEG-tube. Added fiber packet 9/6.  * On 9/8, noted that diarrhea improved.  * Budesonide suspension added 9/9.  -- cont budesonide suspension BID; fiber packet daily.  -- change/adjust formula as needed.  -- cont PRN loperamide 4 mg.     Anemia, suspect chronic component.  * Hgb 9.3 on admit. No overt clinical signs of major bleeding.  * Hgb stable at 8-9 this stay  -- monitor CBC     Thrombocytosis, suspect reactive.  * Plts 602K on admit 9/5.  * Treat other issues as noted.  -- monitor CBC     Stage I sacral pressure ulceration, present on admission.    * On admit, noted nonblanching erythema present; no open sores appreciated. WOC RN consulted.  -- cont local wound cares, frequent repositioning.     Weakness and physical deconditioning due to multiple acute and chronic medical issues.  * Recent prolonged hospitalization at [facility] (7/21-8/5/2022) as above. Was at TCU PTA.  -- OOB to chair at least 3x/day.  -- cont PT/OT, discharge plans as below     Coronary artery disease.  * Known CAD with history of extensive proximal to distal RCA stenting; patent on last angiogram in May 2020.  -- cont clopidogrel.     CVA history.  -- cont apixaban, atorvastatin.     MGUS.  * February  2022 bone marrow biopsy with hypercellular marrow including 7% plasma cells. Followed w/ q6 month SPEP through oncology service.  -- follow-up outpatient.     FEN: no IVFs, lytes stable, NPO, cont TFs per nutritionist  DVT Prophylaxis: DOAC, PCDs  Code Status: Full Code    Disposition: Medically stable to discharge. Had been planning for TCU stay but note concerns with cost (see prior SW notes). Patient has now elected to discharge home with son (son agreeable) and home care services. Will need hospital bed, bedside commode and home care services (home infusion for TFs and PT/OT, RN, HHA). Orders placed 9/16. CC following.     Discharge home once patient's son has appropriate medical equipment and home care services coordinated -- likely 9/19.    Nishi Ramos, DO    Interval History    Seen this morning. Feeling a little tired but otherwise okay. No specific complaints. Some ongoing cough, intermittently productive of whitish sputum. No cp/sob. No abd pain/n/v. Loose stools improving.     -Data reviewed today: I reviewed all new labs and imaging results over the last 24 hours. I personally reviewed no images or EKG's today.    Physical Exam   Temp: 97.7  F (36.5  C) Temp src: Oral BP: 109/73 Pulse: 86   Resp: 18 SpO2: 95 % O2 Device: None (Room air)    Vitals:    09/11/22 0640 09/13/22 0555 09/17/22 0455   Weight: 49 kg (108 lb) 54.4 kg (120 lb) 55.6 kg (122 lb 8 oz)     Vital Signs with Ranges  Temp:  [97.1  F (36.2  C)-97.7  F (36.5  C)] 97.7  F (36.5  C)  Pulse:  [86-94] 86  Resp:  [18] 18  BP: ()/(59-73) 109/73  SpO2:  [91 %-95 %] 95 %  I/O last 3 completed shifts:  In: 620 [NG/GT:620]  Out: 500 [Urine:500]    Constitutional: Resting comfortably, alert and answering basic questions appropriately, NAD  Respiratory: few faint bibasilar crackles but otherwise CTA, no wheeze, no increased work of breathing  Cardiovascular: HR irregular, + murmur, no LE edema   GI: S, NT, ND, +BS,  +PEG  Skin/Integumen: warm/dry  Other:      Medications     dextrose       Reason anticoagulant not prescribed for atrial fibrillation       - MEDICATION INSTRUCTIONS -         acetaminophen  975 mg Per G Tube TID     apixaban ANTICOAGULANT  5 mg Per G Tube BID     atorvastatin  40 mg Per G Tube At Bedtime     banatrol plus  1 packet Per Feeding Tube TID     budesonide  4.5 mg Per G Tube BID     calcium carbonate 600 mg-vitamin D 400 units  1 tablet Per G Tube BID     cefuroxime  500 mg Per Feeding Tube Q12H [facility] (08/20)     clopidogrel  75 mg Per G Tube Daily     digoxin  125 mcg Per G Tube Daily     ferrous sulfate  220 mg Per G Tube Daily     fiber modular (NUTRISOURCE FIBER)  1 packet Per Feeding Tube Daily     gabapentin  300 mg Per G Tube QAM     gabapentin  600 mg Per G Tube At Bedtime     metroNIDAZOLE  500 mg Per Feeding Tube TID     mirtazapine  15 mg Per G Tube At Bedtime     multivitamin  with lutein  1 capsule Per G Tube Daily     sodium chloride (PF)  3 mL Intracatheter Q8H       Data   Recent Labs   Lab 09/15/22  0911 09/15/22  0706 09/14/22  0410 09/14/22  0409 09/13/22  2045 09/13/22  0704 09/12/22  0637   WBC  --  15.1*  --  17.2*  --  16.8* 15.2*   HGB  --  8.8*  --  9.0*  --  8.8* 8.7*   MCV  --  100  --  102*  --  101* 101*   PLT  --  496*  --  514*  --  526* 518*   NA  --  137 140  --   --   --  138   POTASSIUM  --  3.7  3.6 3.8  3.8  --  3.3*  --  3.3*   CHLORIDE  --  107 108  --   --   --  109   CO2  --  27 26  --   --   --  23   BUN  --  15 15  --   --   --  17   CR  --  0.48* 0.47*  --   --   --  0.51*   ANIONGAP  --  3 6  --   --   --  6   ULISSES  --  8.2* 7.7*  --   --   --  7.8*   * 135* 127*  --   --   --  132*       No results found for this or any previous visit (from the past 24 hour(s)).

## 2022-09-18 NOTE — PLAN OF CARE
Goal Outcome Evaluation:          Neuro- A&O  Most Recent Vitals- Temp: 97.5  F (36.4  C) Temp src: Oral BP: 113/76 Pulse: 83   Resp: 18 SpO2: 96 % O2 Device: None (Room air)   Tele/Cardiac- SR  Resp- RA  Activity- up with 1 to BC  Pain- given schedule tylenol effective  Drips- TF on continuous  Drains/Tubes- NA  Skin- Scattered bruised,maplex applied on sacrum  GI/- BC  Aggression Color- green  COVID status- negative  Plan- TBD  Misc- At A&O denies pain, cms intact, on continuous TF with schedule water flush. Turn and repo, Pt is NPO at this time for possible aspiration pneumonia, pulmonary toilet encourage up on awake. Plan to go home pending continue to monitor.    Shania Knott RN

## 2022-09-18 NOTE — PLAN OF CARE
Goal Outcome Evaluation:  A&Ox4. VSS. Denies pain. Frequent productive cough, LS coarse. Voiding in urinal. A1-2 gb/walker to BSC, BM x1. T/R q2h, on pulsate mattress.  NPO, oral cares. Peg TF running at goal rate of 30mL/hr with 160ml q4hr flushes.  L PIV SL. Discharging home with son with home care,possible tomorrow.

## 2022-09-18 NOTE — PROGRESS NOTES
[facility]    Hospitalist Progress Note    Assessment & Plan   Efrem Ramos is a 77 year old male with PMHx including pulmonary fibrosis; chronic dysphagia with h/o aspiration pneumonia; severe AS; CAD; PAD; afib/flutter; stroke; MEL; MGUS; ACD; and recent hospitalization (7/21-8/5/2022) for failure to thrive with malnutrition related to dysphagia, s/p PEG tube (7/28/2022). He presented from TCU 9/5/2022 with ongoing diarrhea and worsening cough and is was to have atrial fibrillation with rapid ventricular rate, possible aspiration pneumonia with right-sided infiltrate. He was admitted on 9/5/2022 for ongoing evaluation and care.     On initial evaluation 9/5, pt was afebrile, tachycardic; ECG showed afib with RVR, iRBBB; WBC 18.3, hgb 9.3, NTP-BNP 4330, procalcitonin <0.05, lactate normal. CXR 9/5 showed persistent infiltrates throughout the right long, which appeared slightly increased, as compared to the previous chest CT (7/2022); findings were superimposed on a background of pulmonary fibrosis; blunting of the right costophrenic angle appeared chronic.     Pneumonia, suspect aspiration.  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  * Pt with h/o dysphagia and chronic aspiration s/p PEG-tube 7/28.  * Initial presentation as above. Given antibiotics in ED; not continued after admit given suspected chronic findings on presentation. BC's 9/5 NG.  * CT chest 9/7 showed new patchy consolidative opacities in both lungs, right greater than left, noted this most likely represented pneumonia superimposed on the patient's background fibrosis.  * Started on ceftriaxone and metronidazole 9/8.  * Sputum 9/10 orally contaminated.  * Ceftriaxone changed fo cefuroxime 9/12.  * Completed course of treatment with ceftriaxone and metronidazole on 9/17.   -- cont PRN ipratropium-albuterol nebs.  -- O2 sats stable on RA     Acute on chronic  CHF exacerbation (valvular).  Afib with RVR contributing or related to above.  Severe AS.  Severe TR.  * PTA on apixaban 5 mg BID and digoxin 125 mcg daily. Pt with chronic hypotension, limiting cardiac meds.  * Initial presentation as above. Cardiology consulted on admit.  * Echo 9/6 showed LVEF 60-65%; RV moderate to severely dilated with normal RV systolic function; signs of RV pressure/volume overload; severe AS; severe TR; mild-moderate MR; pHTN present; trivial pericardial effusion.  * Cardiology evaluated patient and recommended medical management for his severe aortic stenosis since he is not a candidate for TAVR due to his various underlying medical problems (from Cardiology note, was seen in 11/2021 and deemed too high risk given frailty). They also recommended palliative and hospice consult due to his progressive end-stage aortic stenosis.   * Patient refused hospice consult and wanted to proceed with aggressive measures.  * CT chest 9/7 showed findings more consistent with pneumonia on top of pulmonary fibrosis (see below).  -- cont apixaban; digoxin 125 mcg daily.  -- telemetry dc'd 9/16.  -- follow-up with Cardiology outpatient.     Dysphagia with suspected chronic aspiration, s/p PEG-tube placement (7/28/2022).  * Had a percutaneous G-tube placed via surgical team 7/28 for chronic aspiration and failure to thrive; IR was not an option as colon was overlying stomach.  -- cont TF's via PEG tube.  -- change/adjust formula as needed, given diarrhea (see below).     Persistent diarrhea, suspect related to TF's, question related to collagenous colitis, improved.  H/o collagenous colitis.  * On admit, patient reported diarrhea has been an ongoing issue since PEG-tube placement. On admit, he expressed an interest in colostomy so that he might avoid ongoing episodes of diarrhea and need for pericares; it was discussed with patient that a colostomy in this instance would be for quality of life, and an elective  procedure and with his valvular heart failure and tenuous respiratory status with chronic aspiration, would be a poor surgical candidate for this, and focus should be on attempting to adjust to pain formula to treat likely diarrhea associated with tube feeds versus determine underlying etiology of diarrhea and provide treatment. On admit, noted that in 2005, patient had a cecal biopsy with pathology consistent with collagenous colitis.  * PRN loperamide increased on admit. Nutrition consulted to consider addition of fiber to feed regimen given persistent diarrhea. [facility] consulted on admit.  * C. Difficile, enteric panel negative 9/6. Seen by GI 9/6 who recommended adding budesonide, however it is enteric coated and could not be given via PEG-tube. Added fiber packet 9/6.  * On 9/8, noted that diarrhea improved.  * Budesonide suspension added 9/9.  -- cont budesonide suspension BID; fiber packet daily.  -- cont PRN loperamide 4 mg.     Anemia, suspect chronic component.  * Hgb 9.3 on admit. No overt clinical signs of major bleeding.  * Hgb stable at 8-9 this stay  -- monitor CBC     Thrombocytosis, suspect reactive.  * Plts 602K on admit 9/5.  * Treat other issues as noted.  -- monitor CBC     Stage I sacral pressure ulceration, present on admission.    * On admit, noted nonblanching erythema present; no open sores appreciated. WOC RN consulted.  -- cont local wound cares, frequent repositioning.     Weakness and physical deconditioning due to multiple acute and chronic medical issues.  * Recent prolonged hospitalization at [facility] (7/21-8/5/2022) as above. Was at TCU PTA.  -- OOB to chair at least 3x/day.  -- cont PT/OT, discharge plans as below     Coronary artery disease.  * Known CAD with history of extensive proximal to distal RCA stenting; patent on last angiogram in May 2020.  -- cont clopidogrel.     CVA history.  -- cont apixaban, atorvastatin.     MGUS.  * February 2022 bone marrow biopsy with hypercellular  marrow including 7% plasma cells. Followed w/ q6 month SPEP through oncology service.  -- follow-up outpatient.     FEN: no IVFs, lytes stable, NPO, cont TFs per nutritionist  DVT Prophylaxis: DOAC, PCDs  Code Status: Full Code    Disposition: Remains medically stable to discharge as of 9/13. Had been planning for TCU stay but note concerns with cost (see SW note from 9/15). Patient has now elected to discharge home with son (son agreeable) and home care services. Will need hospital bed, bedside commode and home care services (home infusion for TFs and PT/OT, RN, HHA). Orders placed 9/16. CC following.     Discharge home once patient's son has appropriate medical equipment and home care services coordinated -- likely 9/19.    Nishi Ramos, DO    Interval History    Seen this afternoon. Feeling well. No specific complaints. Breathing okay. Occ cough, minimal sputum production. No cp. No abd pain/n/v. Loose stools cont to improve. Questions about caring for G-tube at home and if his son will know what to do.     -Data reviewed today: I reviewed all new labs and imaging results over the last 24 hours. I personally reviewed no images or EKG's today.    Physical Exam   Temp: 97.3  F (36.3  C) Temp src: Oral BP: 102/78 Pulse: 81   Resp: 18 SpO2: 95 % O2 Device: None (Room air)    Vitals:    09/13/22 0555 09/17/22 0455 09/18/22 0515   Weight: 54.4 kg (120 lb) 55.6 kg (122 lb 8 oz) 56.3 kg (124 lb 1.9 oz)     Vital Signs with Ranges  Temp:  [97.3  F (36.3  C)-97.6  F (36.4  C)] 97.3  F (36.3  C)  Pulse:  [81-83] 81  Resp:  [18] 18  BP: (102-115)/(75-78) 102/78  SpO2:  [93 %-96 %] 95 %  I/O last 3 completed shifts:  In: 1275 [NG/GT:1275]  Out: 150 [Urine:150]    Constitutional: Resting comfortably, alert and conversing appropriately, NAD  Respiratory: few faint bibasilar crackles but otherwise CTA, no wheeze, no increased work of breathing  Cardiovascular: HR irregular, + murmur, no LE edema   GI: S, NT, ND, +BS,  +PEG  Skin/Integumen: warm/dry  Other:      Medications     dextrose       Reason anticoagulant not prescribed for atrial fibrillation       - MEDICATION INSTRUCTIONS -         acetaminophen  975 mg Per G Tube TID     apixaban ANTICOAGULANT  5 mg Per G Tube BID     atorvastatin  40 mg Per G Tube At Bedtime     banatrol plus  1 packet Per Feeding Tube TID     budesonide  4.5 mg Per G Tube BID     calcium carbonate 600 mg-vitamin D 400 units  1 tablet Per G Tube BID     clopidogrel  75 mg Per G Tube Daily     digoxin  125 mcg Per G Tube Daily     ferrous sulfate  220 mg Per G Tube Daily     fiber modular (NUTRISOURCE FIBER)  1 packet Per Feeding Tube Daily     gabapentin  300 mg Per G Tube QAM     gabapentin  600 mg Per G Tube At Bedtime     mirtazapine  15 mg Per G Tube At Bedtime     multivitamin  with lutein  1 capsule Per G Tube Daily     sodium chloride (PF)  3 mL Intracatheter Q8H       Data   Recent Labs   Lab 09/18/22  0816 09/15/22  0911 09/15/22  0706 09/14/22  0410 09/14/22  0409 09/13/22 2045 09/13/22  0704 09/12/22  0637   WBC  --   --  15.1*  --  17.2*  --  16.8* 15.2*   HGB  --   --  8.8*  --  9.0*  --  8.8* 8.7*   MCV  --   --  100  --  102*  --  101* 101*   PLT  --   --  496*  --  514*  --  526* 518*   NA  --   --  137 140  --   --   --  138   POTASSIUM  --   --  3.7  3.6 3.8  3.8  --  3.3*  --  3.3*   CHLORIDE  --   --  107 108  --   --   --  109   CO2  --   --  27 26  --   --   --  23   BUN  --   --  15 15  --   --   --  17   CR 0.46*  --  0.48* 0.47*  --   --   --  0.51*   ANIONGAP  --   --  3 6  --   --   --  6   ULISSES  --   --  8.2* 7.7*  --   --   --  7.8*   GLC  --  116* 135* 127*  --   --   --  132*       No results found for this or any previous visit (from the past 24 hour(s)).

## 2022-09-19 NOTE — CONSULTS
"Care Management Follow Up    Length of Stay (days): 14    Expected Discharge Date: 09/19/2022     Concerns to be Addressed:  Discharge to home with [facility] and Homecare DME     Patient plan of care discussed at interdisciplinary rounds: Yes    Anticipated Discharge Disposition: Home with Son to assist Home infusion and homecare with DME (hospital bed and bedside commode)     Anticipated Discharge Services: Son most likely able to transport at discharge  Anticipated Discharge DME:  Hospital bed and bedside commode      Patient/family educated on Medicare website which has current facility and service quality ratings:  no  Education Provided on the Discharge Plan:  yes  Patient/Family in Agreement with the Plan: yes    Referrals Placed by CM/SW:  CM  Private pay costs discussed: Not applicable    Additional Information:  Writer called [employer] regarding DME authorization talked to [employer] phone [phone], per [employer] they have received the initial paperwork, however they will most likely require more documentation.  Writer faxed the same information to [facility] today to see if they are able to authorize for the DME equipment.    Email to [facility] Liaison to see if they have secured for the infusion and homecare.   Will continue to follow and update discharging provider. Patient is most likely medically stable pending DME and HI/HC lined up.  Will update patient/son when update is known.     Addendum: 9/19/22 12:06 p.m.  Writer talked to the patient and informed him that we are currently working on home infusion, homecare and DME and it is likely that we will have this set up today or tomorrow at the latest.  Patient deferred this writer to talk to his son as he does not \"want to upset the apple cart.\"  Writer called and talked to patient's son Alfredo by phone.  Alfredo stated \"this has been sprung on me, I am working on clearing out the home and need dimensions of the bed, I am not sure when I will have this ready.\" " Writer confirmed with Alfredo that the patient is medically stable for discharge and that we will need to continue to work on discharge.  Writer informed Alfredo that we will call tomorrow for a follow up.  Writer attempted to call [facility] as they were in receipt of the orders and were most likely able to deliver, however now due to son stating the home is not ready will need to update them and have [facility] call the son with the dimensions of the bed.  FYI page to provider with the above.  Will continue to follow.    Addendum: 9/19/22 14:00   Writer talked to Jacqueline at [facility] she has cleared insurance for both the hospital bed and bedside commode. She is aware that patients son is working on clearing out a spot for the DME and will send writer information on dimensions of bed per son's request.  Jacqueline is aware that we will continue to follow for discharge and delivery confirmation.  Will email [facility] Liaison regarding update.           Tyra Davis RN, BSN, ACM   Care Transitions Specialist   [facility]  Care Transitions Specialist   [address] [place]. [address]  [email or web address]  Office:[phone] Fax: [phone]  [facility]

## 2022-09-19 NOTE — PLAN OF CARE
Primary Diagnosis: diarrhea and worsening cough and A-fib RVR.  Aspiration pneumonia with right-sided infiltrate.  Orientation: A/O x4  Assist x 1-2 pivot.  Denies pain. K+ replaced; recheck in for 1530. Strict NPO. PEG tube; TF at goal 30 w/ 160ml q4 flushes.  Uses urinal & commode; also incontinent at times. Mepilex to coccyx CDI, wound cares done.  Denies pain. LS coarse.  PIV SL.  Plan to discharge home with Son and HHC once equipment is delivered and ready at home.  CTM.

## 2022-09-19 NOTE — PROGRESS NOTES
[facility]    Internal Medicine Hospitalist Progress Note  09/19/2022  I evaluated patient on the above date.    Mj Tinsley Jr., MD  [phone] (p)  Text Page  Vocera        Assessment & Plan New actions/orders today (09/19/2022) are underlined.    Efrem Ramos is a 77 year old male with PMHx including pulmonary fibrosis; chronic dysphagia with h/o aspiration pneumonia; severe AS; CAD; PAD; afib/flutter; stroke; MEL; MGUS; ACD; and recent hospitalization (7/21-8/5/2022) for failure to thrive with malnutrition related to dysphagia, s/p PEG tube (7/28/2022). He presented from TCU 9/5/2022 with ongoing diarrhea and worsening cough and is was to have atrial fibrillation with rapid ventricular rate, possible aspiration pneumonia with right-sided infiltrate. He was admitted on 9/5/2022 for ongoing evaluation and care.     On initial evaluation 9/5, pt was afebrile, tachycardic; ECG showed afib with RVR, iRBBB; WBC 18.3, hgb 9.3, NTP-BNP 4330, procalcitonin <0.05, lactate normal. CXR 9/5 showed persistent infiltrates throughout the right long, which appeared slightly increased, as compared to the previous chest CT (7/2022); findings were superimposed on a background of pulmonary fibrosis; blunting of the right costophrenic angle appeared chronic.       Pneumonia, suspect aspiration.  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  * Pt with h/o dysphagia and chronic aspiration s/p PEG-tube 7/28.  * Initial presentation as above. Given antibiotics in ED; not continued after admit given suspected chronic findings on presentation. BC's 9/5 NG.  * CT chest 9/7 showed new patchy consolidative opacities in both lungs, right greater than left, noted this most likely represented pneumonia superimposed on the patient's background fibrosis.  * Started on ceftriaxone and metronidazole 9/8.  * Sputum 9/10 orally contaminated.  * Ceftriaxone  changed fo cefuroxime 9/12.  * Completed course of treatment with cefuroxime and metronidazole on 9/17.   -- O2 sats stable on RA.  -- Cont PRN ipratropium-albuterol nebs.     Acute on chronic CHF exacerbation (valvular).  Afib with RVR contributing or related to above.  Severe AS.  Severe TR.  * PTA on apixaban 5 mg BID and digoxin 125 mcg daily. Pt with chronic hypotension, limiting cardiac meds.  * Initial presentation as above. Cardiology consulted on admit.  * Echo 9/6 showed LVEF 60-65%; RV moderate to severely dilated with normal RV systolic function; signs of RV pressure/volume overload; severe AS; severe TR; mild-moderate MR; pHTN present; trivial pericardial effusion.  * Cardiology evaluated patient and recommended medical management for his severe aortic stenosis since he is not a candidate for TAVR due to his various underlying medical problems (from Cardiology note, was seen in 11/2021 and deemed too high risk given frailty). They also recommended palliative and hospice consult due to his progressive end-stage aortic stenosis.   * Patient refused hospice consult and wanted to proceed with aggressive measures.  * CT chest 9/7 showed findings more consistent with pneumonia on top of pulmonary fibrosis (see below).  * Telemetry dc'd 9/16.  -- Cont apixaban; digoxin 125 mcg daily.  -- Follow-up with Cardiology outpatient.     Dysphagia with suspected chronic aspiration, s/p PEG-tube placement (7/28/2022).  * Had a percutaneous G-tube placed via surgical team 7/28 for chronic aspiration and failure to thrive; IR was not an option as colon was overlying stomach.  -- Cont TF's via PEG tube.  -- Change/adjust formula as needed, given diarrhea (see below).     Persistent diarrhea, suspect related to TF's, question related to collagenous colitis, improved.  H/o collagenous colitis.  * On admit, patient reported diarrhea has been an ongoing issue since PEG-tube placement. On admit, he expressed an interest in  colostomy so that he might avoid ongoing episodes of diarrhea and need for pericares; it was discussed with patient that a colostomy in this instance would be for quality of life, and an elective procedure and with his valvular heart failure and tenuous respiratory status with chronic aspiration, would be a poor surgical candidate for this, and focus should be on attempting to adjust to pain formula to treat likely diarrhea associated with tube feeds versus determine underlying etiology of diarrhea and provide treatment. On admit, noted that in 2005, patient had a cecal biopsy with pathology consistent with collagenous colitis.  * PRN loperamide increased on admit. Nutrition consulted to consider addition of fiber to feed regimen given persistent diarrhea. [facility] consulted on admit.  * C. Difficile, enteric panel negative 9/6. Seen by GI 9/6 who recommended adding budesonide, however it is enteric coated and could not be given via PEG-tube. Added fiber packet 9/6.  * On 9/8, noted that diarrhea improved.  * Budesonide suspension added 9/9.  -- Cont budesonide suspension BID; fiber packet daily.  -- Cont PRN loperamide 4 mg.     Anemia, suspect chronic component.  * Hgb 9.3 on admit. No overt clinical signs of major bleeding.  * Hgb stable at 8-9 this stay.  Recent Labs   Lab 09/15/22  0706 09/14/22  0409 09/13/22  0704   HGB 8.8* 9.0* 8.8*   -- Monitor CBC periodically as needed.     Thrombocytosis, suspect reactive.  * Plts 602K on admit 9/5.  Recent Labs   Lab 09/15/22  0706 09/14/22  0409 09/13/22  0704   * 514* 526*   -- Treat other issues as noted.  -- Monitor CBC.     Stage I sacral pressure ulceration, present on admission.    * On admit, noted nonblanching erythema present; no open sores appreciated. WOC RN consulted.  -- Cont local wound cares, frequent repositioning.     Weakness and physical deconditioning due to multiple acute and chronic medical issues.  * Recent prolonged hospitalization at [facility]  (7/21-8/5/2022) as above. Was at TCU PTA.  -- OOB to chair at least 3x/day.  -- Cont PT/OT, discharge plans as below.     Coronary artery disease.  * Known CAD with history of extensive proximal to distal RCA stenting; patent on last angiogram in May 2020.  -- Cont clopidogrel.     CVA history.  -- Cont apixaban, atorvastatin.     MGUS.  * February 2022 bone marrow biopsy with hypercellular marrow including 7% plasma cells. Followed w/ q6 month SPEP through oncology service.  -- Follow-up outpatient.     Clinically Significant Risk Factors Present on Admission                        COVID-19 testing.  COVID-19 PCR Results    COVID-19 PCR Results 1/10/22 1/18/22 7/21/22 7/29/22 9/5/22 9/13/22   SARS CoV2 PCR Negative Negative Negative Negative Negative Negative      Comments are available for some flowsheets but are not being displayed.         COVID-19 Antibody Results, Testing for Immunity    COVID-19 Antibody Results, Testing for Immunity   No data to display.             Diet: NPO for Medical/Clinical Reasons Except for: NPO but receiving Tube Feeding  Adult Formula Drip Feeding: Continuous TwoCal HN; Gastrostomy; Goal Rate: 30; mL/hr; Medication - Feeding Tube Flush Frequency: At least 15-30 mL water before and after medication administration and with tube clogging; Amount to Send (Nutrition us...    Prophylaxis: PCD's, ambulation. On apixaban.  Bravo Catheter: Not present  Central Lines: None  Code Status: Full Code    Disposition Plan   Expected discharge: Recommended to transitional care unit pending above.  Remains medically stable to discharge as of 9/13. Had been planning for TCU stay but note concerns with cost (see SW note from 9/15). Patient subsequently elected to discharge home with son (son agreeable) and home care services. Will need hospital bed, bedside commode and home care services (home infusion for TFs and PT/OT, RN, HHA). Orders placed 9/16. CC following.      Discharge home once patient's  son has appropriate medical equipment and home care services coordinated -- likely 1-2 days for son to acquire said medical equipment and make arrangements; pt high risk for quick re-admission.     Entered: Mj Tinsley MD 09/19/2022, 7:47 AM         Interval History   Doing about the same.  Feels a bit stronger since last week.    -Data reviewed today: I reviewed all new labs and imaging over the last 24 hours. I personally reviewed no images or EKG's today.    Physical Exam    , Blood pressure 121/73, pulse 91, temperature 97.4  F (36.3  C), temperature source Oral, resp. rate 16, weight 56.7 kg (125 lb), SpO2 96 %. O2 Device: None (Room air)    Vitals:    09/17/22 0455 09/18/22 0515 09/19/22 0638   Weight: 55.6 kg (122 lb 8 oz) 56.3 kg (124 lb 1.9 oz) 56.7 kg (125 lb)     Vital Signs with Ranges  Temp:  [97.3  F (36.3  C)-97.7  F (36.5  C)] 97.4  F (36.3  C)  Pulse:  [80-91] 91  Resp:  [16-18] 16  BP: (100-121)/(58-85) 121/73  SpO2:  [94 %-97 %] 96 %  Patient Vitals for the past 24 hrs:   BP Temp Temp src Pulse Resp SpO2 Weight   09/19/22 0727 121/73 97.4  F (36.3  C) Oral 91 16 96 % --   09/19/22 0638 -- -- -- -- -- -- 56.7 kg (125 lb)   09/19/22 0021 100/58 97.5  F (36.4  C) -- 80 16 94 % --   09/18/22 1534 117/85 97.7  F (36.5  C) Oral 85 18 97 % --   09/18/22 0807 102/78 97.3  F (36.3  C) Oral 81 18 95 % --     I/O's Last 24 hours  I/O last 3 completed shifts:  In: 6348 [NG/GT:3841]  Out: 625 [Urine:625]    Constitutional: Awake, alert, pleasant.  Respiratory: Diminished in bases. No wheezes.  Cardiovascular: RRR, +I/VI systolic m, no g/r.  GI: Soft, nt, nd, +BS.  Skin/Integumen:   Other:        Data   Recent Labs   Lab 09/18/22  0816 09/15/22  0911 09/15/22  0706 09/14/22  0410 09/14/22  0409 09/13/22  2045 09/13/22  0704   WBC  --   --  15.1*  --  17.2*  --  16.8*   HGB  --   --  8.8*  --  9.0*  --  8.8*   MCV  --   --  100  --  102*  --  101*   PLT  --   --  496*  --  514*  --  526*   NA  --   --   137 140  --   --   --    POTASSIUM  --   --  3.7  3.6 3.8  3.8  --  3.3*  --    CHLORIDE  --   --  107 108  --   --   --    CO2  --   --  27 26  --   --   --    BUN  --   --  15 15  --   --   --    CR 0.46*  --  0.48* 0.47*  --   --   --    ANIONGAP  --   --  3 6  --   --   --    ULISSES  --   --  8.2* 7.7*  --   --   --    GLC  --  116* 135* 127*  --   --   --      Recent Labs   Lab Test 09/15/22  0911 09/15/22  0706 09/14/22  0410 09/12/22  0637 09/11/22  0601 05/20/20  0415 05/19/20  1254 05/19/20 0922 05/19/20  0850 05/19/20  0508 05/18/20 2025 05/18/20 2015 08/08/16  0648 08/04/16  0758   * 135* 127* 132* 113*   < >  --   --    < >  --    < >  --    < >  --    BGM  --   --   --   --   --   --  99 108*  --  111*  --  101*  --  107*    < > = values in this interval not displayed.     Recent Labs   Lab 09/15/22  0706 09/14/22  0409 09/13/22  2259 09/13/22  0704   WBC 15.1* 17.2*  --  16.8*   LACT  --   --  1.1  --          No results found for this or any previous visit (from the past 24 hour(s)).    Medications   All medications were reviewed.    dextrose       Reason anticoagulant not prescribed for atrial fibrillation       - MEDICATION INSTRUCTIONS -         acetaminophen  975 mg Per G Tube TID     apixaban ANTICOAGULANT  5 mg Per G Tube BID     atorvastatin  40 mg Per G Tube At Bedtime     banatrol plus  1 packet Per Feeding Tube TID     budesonide  4.5 mg Per G Tube BID     calcium carbonate 600 mg-vitamin D 400 units  1 tablet Per G Tube BID     clopidogrel  75 mg Per G Tube Daily     digoxin  125 mcg Per G Tube Daily     ferrous sulfate  220 mg Per G Tube Daily     fiber modular (NUTRISOURCE FIBER)  1 packet Per Feeding Tube Daily     gabapentin  300 mg Per G Tube QAM     gabapentin  600 mg Per G Tube At Bedtime     mirtazapine  15 mg Per G Tube At Bedtime     multivitamin  with lutein  1 capsule Per G Tube Daily     sodium chloride (PF)  3 mL Intracatheter Q8H     dextrose, HYDROmorphone,  ipratropium - albuterol 0.5 mg/2.5 mg/3 mL, lidocaine 4%, lidocaine (buffered or not buffered), loperamide, melatonin, metoprolol, naloxone **OR** naloxone **OR** naloxone **OR** naloxone, Reason anticoagulant not prescribed for atrial fibrillation, ondansetron **OR** ondansetron, oxyCODONE, - MEDICATION INSTRUCTIONS -, prochlorperazine **OR** prochlorperazine **OR** prochlorperazine, sodium chloride (PF)

## 2022-09-19 NOTE — PLAN OF CARE
A&Ox4, VSS on RA. Not OOB this shift, T/R q2h while in bed. Mepilex to coccyx CDI, wound cares to be completed today. Denies pain. LS coarse and has frequent cough. Voiding in urinal, no BM overnight. PIV SL. PEG with TF at goal rate of 30mL/hr with 160mL H2O q4h. NPO. Plan to discharge home with Son and HHC once equipment is delivered and ready at home.

## 2022-09-19 NOTE — PROVIDER NOTIFICATION
MD Notification    Notified Person: MD    Notified Person Name: DOM Tinsley    Notification Date/Time: 9/19/22 12:16     Notification Interaction: FYI page regarding DME/HI/HC and disposition to home issues.    Purpose of Notification: FYI page to provider that we are working on DME and most likely ready this afternoon or tomorrow however son is stating the home will not be ready.  Patient and son know patient is medically stable for discharge and that we will continue to work on discharge plan    Orders Received: no call back unless further clarification.     Comments:

## 2022-09-20 NOTE — DISCHARGE SUMMARY
[facility]  Discharge Summary        Efrem Ramos MRN# [id number]   YOB: 1945 Age: 77 year old     Date of Admission: 9/5/2022  Date of Discharge: 9/24/2022  Admitting Physician: Jonathan Franks MD  Discharge Physician: Jeronimo Caal MD     Primary Provider: Danny Paige  Primary Care Physician Phone Number: [phone]         Discharge Diagnoses:   1. Pneumonia, suspect aspiration.  2. 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  3. Acute on chronic diastolic CHF exacerbation (valvular).  4. Severe AS.  5. Severe TR.   6. Afib with RVR contributing or related to above.  7. Dysphagia with suspected chronic aspiration, s/p PEG-tube placement (7/28/2022).  8. Persistent diarrhea, suspect related to TF's, question related to collagenous colitis, improved.  9. Anemia, suspect chronic component.  10. Thrombocytosis, suspect reactive.  11. Stage I sacral pressure ulceration, present on admission.    12. Severe malnutrition related to acute and chronic medical issues.  13. Weakness and physical deconditioning due to multiple acute and chronic medical issues.        Other Chronic Medical Problems:      1. Coronary artery disease.  2. CVA history.  3. MGUS.  4. H/o collagenous colitis.       Allergies:         Allergies   Allergen Reactions     Sulfa Drugs Difficulty breathing, Swelling and Hives     Adhesive Tape Blisters     Amiodarone Other (See Comments)     Developed pleural effusion     Latex Unknown     Cephalosporins      Tolerated ceftriaxone      Penicillins Hives     Reaction occurred as a child  Tolerated ceftriaxone in past   Other reaction(s): Hives           Discharge Medications:        Current Discharge Medication List      START taking these medications    Details   Banana Flakes (BANATROL PLUS) 1 packet by Per Feeding Tube route 3 times daily  Qty: 150 each, Refills: 3    Associated  Diagnoses: Nutritional deficiency      budesonide (ENTOCORT) 0.6 mg/mL SUSP 7.5 mLs (4.5 mg) by Per G Tube route 2 times daily  Qty: 450 mL, Refills: 3    Associated Diagnoses: Microscopic colitis, unspecified microscopic colitis type      cefdinir (OMNICEF) 300 MG capsule Take 1 capsule (300 mg) by mouth every 12 hours  Qty: 14 capsule, Refills: 0    Associated Diagnoses: Pneumonia of right upper lobe due to infectious organism      Guar Gum (FIBER MODULAR, NUTRISOURCE FIBER,) packet 1 packet by Per Feeding Tube route daily  Qty: 75 each, Refills: 3    Associated Diagnoses: Nutritional deficiency         CONTINUE these medications which have NOT CHANGED    Details   acetaminophen (TYLENOL) 500 MG tablet 1,000 mg by Per G Tube route 3 times daily      apixaban ANTICOAGULANT (ELIQUIS) 5 MG tablet Take 1 tablet (5 mg) by mouth 2 times daily  Qty: 180 tablet, Refills: 3    Associated Diagnoses: Chronic a-fib (H)      atorvastatin (LIPITOR) 40 MG tablet Take 1 tablet (40 mg) by mouth daily  Qty: 90 tablet, Refills: 1    Associated Diagnoses: Coronary artery disease involving native coronary artery of native heart without angina pectoris      calcium carbonate 600 mg-vitamin D 400 units (CALTRATE) 600-400 MG-UNIT per tablet 1 tablet by Per G Tube route 2 times daily      clopidogrel (PLAVIX) 75 MG tablet Take 1 tablet (75 mg) by mouth daily  Qty: 90 tablet, Refills: 1    Associated Diagnoses: Coronary artery disease involving native coronary artery of native heart without angina pectoris      diclofenac (VOLTAREN) 1 % topical gel Apply 4 g topically 4 times daily as needed for moderate pain  Qty: 150 g, Refills: 11    Associated Diagnoses: Age-related osteoporosis with current pathological fracture with routine healing, subsequent encounter      digoxin (LANOXIN) 125 MCG tablet Take 1 tablet (125 mcg) by mouth daily  Qty: 90 tablet, Refills: 3    Associated Diagnoses: Chronic a-fib (H)      ferrous sulfate 220 (44 Fe)  MG/5ML ELIX 220 mg by Per G Tube route daily      !! gabapentin (NEURONTIN) 300 MG capsule 300 mg by Per G Tube route every morning At 0800      !! gabapentin (NEURONTIN) 300 MG capsule Take 2 capsules (600 mg) by mouth At Bedtime  Qty: 180 capsule, Refills: 3    Associated Diagnoses: Restless legs syndrome (RLS)      ipratropium - albuterol 0.5 mg/2.5 mg/3 mL (DUONEB) 0.5-2.5 (3) MG/3ML neb solution Take 1 vial by nebulization every 4 hours as needed for shortness of breath / dyspnea or wheezing      Lidocaine (LIDOCARE) 4 % Patch Place 1 patch onto the skin every 24 hours To prevent lidocaine toxicity, patient should be patch free for 12 hrs daily. Apply to abdomen      loperamide (IMODIUM) 1 MG/5ML liquid 2 mg by Per G Tube route 4 times daily as needed for diarrhea      meclizine (ANTIVERT) 25 MG tablet Take 1 tablet (25 mg) by mouth 2 times daily    Associated Diagnoses: Vertigo      melatonin 3 MG tablet 6 mg by Per G Tube route At Bedtime      mirtazapine (REMERON) 15 MG tablet Take 1 tablet (15 mg) by mouth At Bedtime  Qty: 90 tablet, Refills: 3    Associated Diagnoses: Depression, unspecified depression type      multivitamin (OCUVITE) TABS 1 tablet by Per G Tube route daily      oxyCODONE (ROXICODONE) 5 MG tablet Take 0.5 tablets (2.5 mg) by mouth every 6 hours as needed for severe pain  Qty: 20 tablet, Refills: 0    Associated Diagnoses: Pain      potassium chloride ER (K-TAB/KLOR-CON) 10 MEQ CR tablet Take 20 mEq by mouth daily Give via G-tube. Do not crush medication, make a slurry.      senna-docusate (SENOKOT-S/PERICOLACE) 8.6-50 MG tablet 2 tablets by Per G Tube route 2 times daily as needed for constipation       !! - Potential duplicate medications found. Please discuss with provider.      STOP taking these medications       albuterol (PROVENTIL) (2.5 MG/3ML) 0.083% neb solution Comments:   Reason for Stopping:                   Discharge Instructions and Follow-Up:      Discharge Orders     "  Medication Therapy Management Referral      Home Infusion Referral      Home Care Referral      Follow-up and recommended labs and tests    Follow-up with primary care provider, Danny Paige MD, within 7 days for hospital follow-up.  The following labs/tests are recommended: CBC, BMP.     Activity    Your activity upon discharge: activity as tolerated     Reason for your hospital stay    1. Pneumonia, suspect aspiration.  2. 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  3. Acute on chronic CHF exacerbation (valvular).  4. Severe AS.  5. Severe TR.   6. Afib with RVR contributing or related to above.  7. Dysphagia with suspected chronic aspiration, s/p PEG-tube placement (7/28/2022).  8. Persistent diarrhea, suspect related to TF's, question related to collagenous colitis, improved.  9. Anemia, suspect chronic component.  10. Thrombocytosis, suspect reactive.  11. Stage I sacral pressure ulceration, present on admission.    12. Weakness and physical deconditioning due to multiple acute and chronic medical issues.     Commode    DME Documentation:   Describe the reason for need to support medical necessity: Patient severely deconditioned due to multiple medical problems and recurrent hospitalizations.     I, the undersigned, certify that the above prescribed supplies are medically necessary for this patient and is both reasonable and necessary in reference to accepted standards of medical and necessary in reference to accepted standards of medical practice in the treatment of this patient's condition and is not prescribed as a convenience.     Hospital Bed    Hospital Bed Documentation:   Hospital bed is required for body positioning, to allow for safe transfers to wheelchair and standing and frequent changes in body position, not feasible in an ordinary bed     NOTE: Patient must have a \"Yes\" in one of the four following questions to qualify " for a hospital bed.    1. Does the patient require positioning of the body in ways not feasible with an ordinary bed due to a medical condition that is expected to last at least 1 month? Yes (Please explain): aspiration requiring placement of PEG tube    2. Does the patient require, for the alleviation of pain, positioning of the body in ways not feasible with an ordinary bed? No     3. Does the patient require the head of the bed to be elevated more than 30 degrees most of the time due to congestive heart failure, chronic pulmonary disease, or aspiration? Yes (Please explain): aspiration, requiring placement of PEG tube    4. Does the patient require traction that can only be attached to a hospital bed? No    Additional Criteria:    Does the patient require frequent changes in body position and/or have an immediate need for change in body position? Yes - Patient qualifies for Semi Electric Bed     Trapeze Criteria:  (Patient must meet standard hospital bed criteria also)   1. Does patient need this device to sit up because of a respiratory condition, for change in body position for other medical reasons, or to get in or out of bed? Yes (Please explain): deconditioning, pulmonary fibrosis    I, the undersigned, certify that the above prescribed supplies are medically necessary for this patient and is both reasonable and necessary in reference to accepted standards of medical and necessary in reference to accepted standards of medical practice in the treatment of this patient's condition and is not prescribed as a convenience.     Diet    Follow this diet upon discharge: Orders Placed This Encounter      Adult Formula Drip Feeding: Continuous TwoCal HN; Gastrostomy; Goal Rate: 30; mL/hr; Medication - Feeding Tube Flush Frequency: At least 15-30 mL water before and after medication administration and with tube clogging; Amount to Send (Nutrition us...      NPO for Medical/Clinical Reasons Except for: NPO but receiving  Tube Feeding      Diet             Consultations This Hospital Stay:      CARDIOLOGY IP CONSULT  WOUND OSTOMY CONTINENCE NURSE  IP CONSULT  NUTRITION SERVICES ADULT IP CONSULT  PHYSICAL THERAPY ADULT IP CONSULT  GASTROENTEROLOGY IP CONSULT  PHARMACY IP CONSULT  CARE MANAGEMENT / SOCIAL WORK IP CONSULT        Admission History:      Please see the H&P by Jonathan Franks MD on 9/5/2022 for complete details. Briefly, Efrem Ramos is a 77 year old male with PMHx including pulmonary fibrosis; chronic dysphagia with h/o aspiration pneumonia; severe AS; CAD; PAD; afib/flutter; stroke; MEL; MGUS; ACD; and recent hospitalization (7/21-8/5/2022) for failure to thrive with malnutrition related to dysphagia, s/p PEG tube (7/28/2022). He presented from TCU 9/5/2022 with ongoing diarrhea and worsening cough and is was to have atrial fibrillation with rapid ventricular rate, possible aspiration pneumonia with right-sided infiltrate. He was admitted on 9/5/2022 for ongoing evaluation and care.     On initial evaluation 9/5, pt was afebrile, tachycardic; ECG showed afib with RVR, iRBBB; WBC 18.3, hgb 9.3, NTP-BNP 4330, procalcitonin <0.05, lactate normal. CXR 9/5 showed persistent infiltrates throughout the right long, which appeared slightly increased, as compared to the previous chest CT (7/2022); findings were superimposed on a background of pulmonary fibrosis; blunting of the right costophrenic angle appeared chronic.        Problem Oriented Hospital Course:        Pneumonia, suspect aspiration.  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  * Pt with h/o dysphagia and chronic aspiration s/p PEG-tube 7/28.  * Initial presentation as above. Given antibiotics in ED; not continued after admit given suspected chronic findings on presentation. BC's 9/5 NG.  * CT chest 9/7 showed new patchy consolidative opacities in both lungs, right greater than left, noted this most  likely represented pneumonia superimposed on the patient's background fibrosis.  * Started on ceftriaxone and metronidazole 9/8.  * Sputum 9/10 orally contaminated.  * Ceftriaxone changed fo cefuroxime 9/12.  * Completed course of treatment with cefuroxime and metronidazole on 9/17.   * On 9/20, pt's son noted pt coughing more; repeat CXR ordered at son's request 9/20 showed slight worsening in the right lung superimposed on diffuse interstitial pulmonary fibrosis. Procalcitonin 9/20 <0.05.  * Started course of cefdinir 9/21.  -- O2 sats stable on RA.  -- Continue cefdinir for 7d at discharge.  -- Cont PRN ipratropium-albuterol nebs.     Acute on chronic CHF exacerbation (valvular).  Afib with RVR contributing or related to above.  Severe AS.  Severe TR.  * PTA on apixaban 5 mg BID and digoxin 125 mcg daily. Pt with chronic hypotension, limiting cardiac meds.  * Initial presentation as above. Cardiology consulted on admit.  * Echo 9/6 showed LVEF 60-65%; RV moderate to severely dilated with normal RV systolic function; signs of RV pressure/volume overload; severe AS; severe TR; mild-moderate MR; pHTN present; trivial pericardial effusion.  * Cardiology evaluated patient and recommended medical management for his severe aortic stenosis since he is not a candidate for TAVR due to his various underlying medical problems (from Cardiology note, was seen in 11/2021 and deemed too high risk given frailty). They also recommended palliative and hospice consult due to his progressive end-stage aortic stenosis.   * Patient refused hospice consult and wanted to proceed with aggressive measures.  * CT chest 9/7 showed findings more consistent with pneumonia on top of pulmonary fibrosis (see below).  * Telemetry dc'd 9/16.  -- Cont apixaban; digoxin 125 mcg daily.  -- Follow-up with Cardiology outpatient.     Dysphagia with suspected chronic aspiration, s/p PEG-tube placement (7/28/2022).  Severe malnutrition related to acute and  chronic medical issues.  * Had a percutaneous G-tube placed via surgical team 7/28 for chronic aspiration and failure to thrive; IR was not an option as colon was overlying stomach.  * Severe malnutrition noted by Nutrition; see their documentation.  -- Cont TF's via PEG tube.  -- Change/adjust formula as needed, given diarrhea (see below).     Persistent diarrhea, suspect related to TF's, question related to collagenous colitis, improved.  H/o collagenous colitis.  * On admit, patient reported diarrhea has been an ongoing issue since PEG-tube placement. On admit, he expressed an interest in colostomy so that he might avoid ongoing episodes of diarrhea and need for pericares; it was discussed with patient that a colostomy in this instance would be for quality of life, and an elective procedure and with his valvular heart failure and tenuous respiratory status with chronic aspiration, would be a poor surgical candidate for this, and focus should be on attempting to adjust to pain formula to treat likely diarrhea associated with tube feeds versus determine underlying etiology of diarrhea and provide treatment. On admit, noted that in 2005, patient had a cecal biopsy with pathology consistent with collagenous colitis.  * PRN loperamide increased on admit. Nutrition consulted to consider addition of fiber to feed regimen given persistent diarrhea. [facility] consulted on admit.  * C. Difficile, enteric panel negative 9/6. Seen by GI 9/6 who recommended adding budesonide, however it is enteric coated and could not be given via PEG-tube. Added fiber packet 9/6.  * On 9/8, noted that diarrhea improved.  * Budesonide suspension added 9/9.  -- Cont budesonide suspension BID; fiber packet daily.  -- Cont PRN loperamide 4 mg.     Anemia, suspect chronic component.  * Hgb 9.3 on admit. No overt clinical signs of major bleeding.  * Hgb stable at 8-9 this stay.  Recent Labs   Lab 09/21/22  0645   HGB 9.4*   -- Monitor CBC periodically  as needed.     Thrombocytosis, suspect reactive.  * Plts 602K on admit 9/5.  * Plts normalized 9/21.  Recent Labs   Lab 09/21/22  0645      -- Treat other issues as noted.  -- Monitor CBC.     Stage I sacral pressure ulceration, present on admission.    * On admit, noted nonblanching erythema present; no open sores appreciated. WOC RN consulted.  -- Cont local wound cares, frequent repositioning.     Weakness and physical deconditioning due to multiple acute and chronic medical issues.  * Recent prolonged hospitalization at [facility] (7/21-8/5/2022) as above. Was at TCU PTA.  * Had been planning for TCU stay but noted concerns with cost (see SW note from 9/15). Patient subsequently elected to discharge home with son (son agreeable) and home care services. Hospital bed, bedside commode and home care services (home infusion for TFs and PT/OT, RN, HHA) ordered 9/16. Delay in discharge due to awaiting hospital bed delivery.  -- Plan discharge to home with son with home cares.  -- Plan home tube feedings; will need instructions and teaching at home.  -- OOB to chair at least 3x/day.  -- Increase activity at home.     Coronary artery disease.  * Known CAD with history of extensive proximal to distal RCA stenting; patent on last angiogram in May 2020.  -- Cont clopidogrel.     CVA history.  -- Cont apixaban, atorvastatin.     MGUS.  * February 2022 bone marrow biopsy with hypercellular marrow including 7% plasma cells. Followed w/ q6 month SPEP through oncology service.  -- Follow-up outpatient.     Clinically Significant Risk Factors Present on Admission                        COVID-19 testing.  COVID-19 PCR Results    COVID-19 PCR Results 1/10/22 1/18/22 7/21/22 7/29/22 9/5/22 9/13/22   SARS CoV2 PCR Negative Negative Negative Negative Negative Negative      Comments are available for some flowsheets but are not being displayed.         COVID-19 Antibody Results, Testing for Immunity    COVID-19 Antibody Results,  Testing for Immunity   No data to display.                   Pending Results:        Unresulted Labs Ordered in the Past 30 Days of this Admission     No orders found from 8/6/2022 to 9/6/2022.                Discharge Disposition:      Discharged to home with home health cares.        Discharge Time:      Approximately 40 minutes.          Condition and Physical on Discharge:    See progress note on the same date as this discharge summary.          Key Imaging Studies, Lab Findings and Procedures/Surgeries:        Results for orders placed or performed during the hospital encounter of 09/05/22   XR Chest 2 Views    Narrative    EXAM: XR CHEST 2 VIEWS  LOCATION: [facility]  DATE/TIME: 9/5/2022 9:16 PM    INDICATION: Cough; hypoxemia; aspiration pneumonia  COMPARISON: 07/21/2022.      Impression    IMPRESSION: Persistent infiltrates throughout the right long, which appear slightly increased, as compared to the previous chest CT. Findings are superimposed on a background of pulmonary fibrosis.     Blunting of the right costophrenic angle appears chronic. No appreciably acute pleural effusion. No pneumothorax.    Unchanged mild cardiomegaly. Atherosclerotic calcifications involve the thoracic aorta, as well as some of the peripheral arterial vasculature. Dense calcifications of the mitral annulus.    Vertebroplasty changes near the thoracolumbar junction.     CT Chest Hi-Resolution wo Contrast    Narrative    CT CHEST HI-RESOLUTION WITHOUT CONTRAST 9/7/2022 2:22 PM    CLINICAL HISTORY: Pneumonia versus heart failure versus fibrosis.    TECHNIQUE: High resolution images were obtained through the chest  during inspiration with select expiratory views. Prone imaging was  performed. Multiplanar reformats were obtained. Dose reduction  techniques were used.    CONTRAST: None.    COMPARISON: 8/2/2022    FINDINGS:   LUNGS AND PLEURA: There is pulmonary fibrosis with peribronchial and  peripheral  reticulation, traction bronchiectasis, and some  honeycombing in the right lung base. There are superimposed  consolidative opacities in both lower lobes, right greater than left,  and in the right upper lobe. A small right pleural effusion has  increased minimally. No significant air trapping on expiration images.    MEDIASTINUM/AXILLAE: Multiple mildly enlarged mediastinal lymph nodes  are similar to previous and most likely reactive. Severe coronary  artery calcification.    UPPER ABDOMEN: No significant finding.    MUSCULOSKELETAL: Unremarkable.      Impression    IMPRESSION:   New patchy consolidative opacities in both lungs, right greater than  left. This most likely represents pneumonia superimposed on the  patient's background fibrosis.    MIKA MENDOSA MD         SYSTEM ID:  [id number]   Echocardiogram Limited     Value    LVEF  60-65%    Narrative    [id number]  [id number]  [id number]  [id number]^ABBY     [facility]  Echocardiography Laboratory  [address] [place] [address]     Name: PHANI AVITIA  MRN: [id number]  : 1945  Study Date: 2022 12:57 PM  Age: 77 yrs  Gender: Male  Patient Location: [facility]  Reason For Study: Aortic Valve Disorder  Ordering Physician: ABBY PATRICIO  Referring Physician: Danny Paige  Performed By: Marya Magallanes     BSA: 1.6 m2  Height: 67 in  Weight: 116 lb  HR: 107  BP: 83/60 mmHg  ______________________________________________________________________________  Procedure  Limited Portable Echo Adult. Optison (NDC #[id number]) given intravenously.  ______________________________________________________________________________  Interpretation Summary     Left ventricular systolic function is normal. The visual ejection fraction is  60-65%. Flattened septum is consistent with RV pressure/volume overload.  Right ventricle is moderate to severely dilated. The right ventricular  systolic function is normal.  Severe aortic  stenosis (low flow low gradient), Vmax 3.3 m/s, mean gradient 25  mmHg, SEBASTIAN 0.69cm2, DI 0.2. SVi 22 cc/m2. There is mild (1+) aortic  regurgitation.  Mild to moderate (1-2+) mitral regurgitation.  Severe (4+) tricuspid regurgitation.  Pulmonary hypertension present. The right ventricular systolic pressure is  approximated at 42mmHg plus the right atrial pressure.  IVC diameter and respiratory changes fall into an intermediate range  suggesting an RA pressure of 8 mmHg.  Trivial pericardial effusion.     This study was compared to a TTE from 7/22/2022. There has been no significant  change.  ______________________________________________________________________________  Left Ventricle  The left ventricle is normal in size. There is normal left ventricular wall  thickness. Left ventricular systolic function is normal. The visual ejection  fraction is 60-65%. Flattened septum is consistent with RV pressure/volume  overload.     Right Ventricle  The right ventricle is moderate to severely dilated. The right ventricular  systolic function is normal.     Atria  The left atrium is mild to moderately dilated. The right atrium is severely  dilated.     Mitral Valve  There is moderate to severe mitral annular calcification. There is mild to  moderate (1-2+) mitral regurgitation.     Tricuspid Valve  There is severe (4+) tricuspid regurgitation. The right ventricular systolic  pressure is approximated at 42mmHg plus the right atrial pressure. Pulmonary  hypertension.     Aortic Valve  There is mild (1+) aortic regurgitation. Severe aortic stenosis (low flow low  gradient), Vmax 3.3 m/s, mean gradient 25 mmHg, SEBASTIAN 0.69cm2, DI 0.2. SVi 22  cc/m2.     Vessels  The aortic root is normal size. Normal size ascending aorta. IVC diameter and  respiratory changes fall into an intermediate range suggesting an RA pressure  of 8 mmHg.     Pericardium  Trivial pericardial effusion.      ______________________________________________________________________________  MMode/2D Measurements & Calculations  IVSd: 1.0 cm  LVIDd: 4.0 cm  LVPWd: 0.94 cm  LV mass(C)d: 122.3 grams  LV mass(C)dI: 76.3 grams/m2  Ao root diam: 3.5 cm  LA dimension: 4.3 cm  asc Aorta Diam: 3.5 cm  LA/Ao: 1.2  LVOT diam: 2.1 cm  LVOT area: 3.5 cm2  RWT: 0.47     Doppler Measurements & Calculations  MV max P.2 mmHg  MV mean P.3 mmHg  MV V2 VTI: 26.8 cm  MVA(VTI): 1.3 cm2  Ao V2 max: 330.8 cm/sec  Ao max P.0 mmHg  Ao V2 mean: 231.5 cm/sec  Ao mean P.0 mmHg  Ao V2 VTI: 54.5 cm  SEBASTIAN(I,D): 0.65 cm2  SEBASTIAN(V,D): 0.69 cm2  LV V1 max P.7 mmHg  LV V1 max: 65.8 cm/sec  LV V1 VTI: 10.2 cm  SV(LVOT): 35.3 ml  SI(LVOT): 22.0 ml/m2  TR max lex: 315.3 cm/sec  TR max P.5 mmHg  AV Lex Ratio (DI): 0.20  SEBASTIAN Index (cm2/m2): 0.40     ______________________________________________________________________________  Report approved by: Funmilayo Wing 2022 02:01 PM

## 2022-09-20 NOTE — PLAN OF CARE
A&Ox4, VSS on RA. Up A1/pivot to BSC, loose bm x1 overnight. Using urinal at bedside. T/R q2h while in bed. GT with TF running at goal rate of 30mL/hr with 160mL H2O flushes q4h. NPO, oral cares provided. Reports HIGH and SOB at times. Mepilex to coccyx CDI, wound cares to be done today. K recheck: 3.7. PIV SL. Plan to discharge home with son and HHC once house is ready for pt.

## 2022-09-20 NOTE — PROGRESS NOTES
[facility]    Internal Medicine Hospitalist Progress Note  09/20/2022  I evaluated patient on the above date.    Mj Tinsley Jr., MD  [phone] (p)  Text Page  Vocera        Assessment & Plan New actions/orders today (09/20/2022) are underlined.    Efrem Ramos is a 77 year old male with PMHx including pulmonary fibrosis; chronic dysphagia with h/o aspiration pneumonia; severe AS; CAD; PAD; afib/flutter; stroke; MEL; MGUS; ACD; and recent hospitalization (7/21-8/5/2022) for failure to thrive with malnutrition related to dysphagia, s/p PEG tube (7/28/2022). He presented from TCU 9/5/2022 with ongoing diarrhea and worsening cough and is was to have atrial fibrillation with rapid ventricular rate, possible aspiration pneumonia with right-sided infiltrate. He was admitted on 9/5/2022 for ongoing evaluation and care.     On initial evaluation 9/5, pt was afebrile, tachycardic; ECG showed afib with RVR, iRBBB; WBC 18.3, hgb 9.3, NTP-BNP 4330, procalcitonin <0.05, lactate normal. CXR 9/5 showed persistent infiltrates throughout the right long, which appeared slightly increased, as compared to the previous chest CT (7/2022); findings were superimposed on a background of pulmonary fibrosis; blunting of the right costophrenic angle appeared chronic.       Pneumonia, suspect aspiration.  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  * Pt with h/o dysphagia and chronic aspiration s/p PEG-tube 7/28.  * Initial presentation as above. Given antibiotics in ED; not continued after admit given suspected chronic findings on presentation. BC's 9/5 NG.  * CT chest 9/7 showed new patchy consolidative opacities in both lungs, right greater than left, noted this most likely represented pneumonia superimposed on the patient's background fibrosis.  * Started on ceftriaxone and metronidazole 9/8.  * Sputum 9/10 orally contaminated.  * Ceftriaxone  changed fo cefuroxime 9/12.  * Completed course of treatment with cefuroxime and metronidazole on 9/17.   -- O2 sats stable on RA.  -- Cont PRN ipratropium-albuterol nebs.     Acute on chronic CHF exacerbation (valvular).  Afib with RVR contributing or related to above.  Severe AS.  Severe TR.  * PTA on apixaban 5 mg BID and digoxin 125 mcg daily. Pt with chronic hypotension, limiting cardiac meds.  * Initial presentation as above. Cardiology consulted on admit.  * Echo 9/6 showed LVEF 60-65%; RV moderate to severely dilated with normal RV systolic function; signs of RV pressure/volume overload; severe AS; severe TR; mild-moderate MR; pHTN present; trivial pericardial effusion.  * Cardiology evaluated patient and recommended medical management for his severe aortic stenosis since he is not a candidate for TAVR due to his various underlying medical problems (from Cardiology note, was seen in 11/2021 and deemed too high risk given frailty). They also recommended palliative and hospice consult due to his progressive end-stage aortic stenosis.   * Patient refused hospice consult and wanted to proceed with aggressive measures.  * CT chest 9/7 showed findings more consistent with pneumonia on top of pulmonary fibrosis (see below).  * Telemetry dc'd 9/16.  -- Cont apixaban; digoxin 125 mcg daily.  -- Follow-up with Cardiology outpatient.     Dysphagia with suspected chronic aspiration, s/p PEG-tube placement (7/28/2022).  * Had a percutaneous G-tube placed via surgical team 7/28 for chronic aspiration and failure to thrive; IR was not an option as colon was overlying stomach.  -- Cont TF's via PEG tube.  -- Change/adjust formula as needed, given diarrhea (see below).     Persistent diarrhea, suspect related to TF's, question related to collagenous colitis, improved.  H/o collagenous colitis.  * On admit, patient reported diarrhea has been an ongoing issue since PEG-tube placement. On admit, he expressed an interest in  colostomy so that he might avoid ongoing episodes of diarrhea and need for pericares; it was discussed with patient that a colostomy in this instance would be for quality of life, and an elective procedure and with his valvular heart failure and tenuous respiratory status with chronic aspiration, would be a poor surgical candidate for this, and focus should be on attempting to adjust to pain formula to treat likely diarrhea associated with tube feeds versus determine underlying etiology of diarrhea and provide treatment. On admit, noted that in 2005, patient had a cecal biopsy with pathology consistent with collagenous colitis.  * PRN loperamide increased on admit. Nutrition consulted to consider addition of fiber to feed regimen given persistent diarrhea. [facility] consulted on admit.  * C. Difficile, enteric panel negative 9/6. Seen by GI 9/6 who recommended adding budesonide, however it is enteric coated and could not be given via PEG-tube. Added fiber packet 9/6.  * On 9/8, noted that diarrhea improved.  * Budesonide suspension added 9/9.  -- Cont budesonide suspension BID; fiber packet daily.  -- Cont PRN loperamide 4 mg.     Anemia, suspect chronic component.  * Hgb 9.3 on admit. No overt clinical signs of major bleeding.  * Hgb stable at 8-9 this stay.  Recent Labs   Lab 09/15/22  0706 09/14/22  0409   HGB 8.8* 9.0*   -- Monitor CBC periodically as needed.     Thrombocytosis, suspect reactive.  * Plts 602K on admit 9/5.  Recent Labs   Lab 09/15/22  0706 09/14/22  0409   * 514*   -- Treat other issues as noted.  -- Monitor CBC.     Stage I sacral pressure ulceration, present on admission.    * On admit, noted nonblanching erythema present; no open sores appreciated. WOC RN consulted.  -- Cont local wound cares, frequent repositioning.     Weakness and physical deconditioning due to multiple acute and chronic medical issues.  * Recent prolonged hospitalization at [facility] (7/21-8/5/2022) as above. Was at [facility]  PTA.  -- OOB to chair at least 3x/day.  -- Cont PT/OT, discharge plans as below.     Coronary artery disease.  * Known CAD with history of extensive proximal to distal RCA stenting; patent on last angiogram in May 2020.  -- Cont clopidogrel.     CVA history.  -- Cont apixaban, atorvastatin.     MGUS.  * February 2022 bone marrow biopsy with hypercellular marrow including 7% plasma cells. Followed w/ q6 month SPEP through oncology service.  -- Follow-up outpatient.     Clinically Significant Risk Factors Present on Admission                        COVID-19 testing.  COVID-19 PCR Results    COVID-19 PCR Results 1/10/22 1/18/22 7/21/22 7/29/22 9/5/22 9/13/22   SARS CoV2 PCR Negative Negative Negative Negative Negative Negative      Comments are available for some flowsheets but are not being displayed.         COVID-19 Antibody Results, Testing for Immunity    COVID-19 Antibody Results, Testing for Immunity   No data to display.             Diet: NPO for Medical/Clinical Reasons Except for: NPO but receiving Tube Feeding  Adult Formula Drip Feeding: Continuous TwoCal HN; Gastrostomy; Goal Rate: 30; mL/hr; Medication - Feeding Tube Flush Frequency: At least 15-30 mL water before and after medication administration and with tube clogging; Amount to Send (Nutrition us...    Prophylaxis: PCD's, ambulation. On apixaban.  Bravo Catheter: Not present  Central Lines: None  Code Status: Full Code    Disposition Plan   Expected discharge: Recommended to home with son pending above.  Remains medically stable to discharge as of 9/13. Had been planning for TCU stay but note concerns with cost (see SW note from 9/15). Patient subsequently elected to discharge home with son (son agreeable) and home care services. Will need hospital bed, bedside commode and home care services (home infusion for TFs and PT/OT, RN, HHA). Orders placed 9/16. CC following.      Discharge home once patient's son has appropriate medical equipment and home  care services coordinated -- likely 1-2 days for son to acquire said medical equipment and make arrangements; pt high risk for quick re-admission.     Entered: Mj Tinsley MD 09/20/2022, 8:14 AM       Communication.  - I d/w pt's son 9/20.      Interval History   Had a rough night; difficulty falling asleep.  Incontinent of urine overnight.  Sitting in chair now.    -Data reviewed today: I reviewed all new labs and imaging over the last 24 hours. I personally reviewed no images or EKG's today.    Physical Exam    , Blood pressure 122/86, pulse 98, temperature 96.9  F (36.1  C), temperature source Oral, resp. rate 16, weight 53.5 kg (118 lb), SpO2 99 %. O2 Device: None (Room air)    Vitals:    09/18/22 0515 09/19/22 0638 09/20/22 0459   Weight: 56.3 kg (124 lb 1.9 oz) 56.7 kg (125 lb) 53.5 kg (118 lb)     Vital Signs with Ranges  Temp:  [96.9  F (36.1  C)-97.5  F (36.4  C)] 96.9  F (36.1  C)  Pulse:  [93-98] 98  Resp:  [16-18] 16  BP: (110-122)/(75-86) 122/86  SpO2:  [93 %-99 %] 99 %  Patient Vitals for the past 24 hrs:   BP Temp Temp src Pulse Resp SpO2 Weight   09/20/22 0740 122/86 96.9  F (36.1  C) Oral 98 16 99 % --   09/20/22 0459 -- -- -- -- -- -- 53.5 kg (118 lb)   09/19/22 2300 119/75 97.5  F (36.4  C) Axillary 93 18 93 % --   09/19/22 1635 110/79 97.1  F (36.2  C) Oral 96 18 96 % --     I/O's Last 24 hours  I/O last 3 completed shifts:  In: 1535 [P.O.:120; I.V.:6; NG/GT:660]  Out: 925 [Urine:925]    Constitutional: Awake, alert, pleasant, conversant, fatigued.  Respiratory: Diminished in bases. Crackles in bases. No wheezes.  Cardiovascular: RRR, +I/VI systolic m, no g/r.  GI: Soft, nt, nd, +BS.  Skin/Integumen:   Other:        Data   Recent Labs   Lab 09/20/22  0733 09/19/22  2127 09/19/22  1519 09/19/22  0735 09/18/22  0816 09/15/22  0911 09/15/22  0706 09/14/22  0410 09/14/22  0409 09/13/22  2045   WBC  --   --   --   --   --   --  15.1*  --  17.2*  --    HGB  --   --   --   --   --   --  8.8*   --  9.0*  --    MCV  --   --   --   --   --   --  100  --  102*  --    PLT  --   --   --   --   --   --  496*  --  514*  --    NA  --   --   --  139  --   --  137 140  --   --    POTASSIUM 3.9 3.7 3.3* 2.9*  --   --  3.7  3.6 3.8  3.8  --   --    CHLORIDE  --   --   --  107  --   --  107 108  --   --    CO2  --   --   --  26  --   --  27 26  --   --    BUN  --   --   --  16  --   --  15 15  --   --    CR  --   --   --  0.43* 0.46*  --  0.48* 0.47*  --    < >   ANIONGAP  --   --   --  6  --   --  3 6  --   --    ULISSES  --   --   --  8.1*  --   --  8.2* 7.7*  --   --    GLC  --   --   --  131*  --  116* 135* 127*  --    < >    < > = values in this interval not displayed.     Recent Labs   Lab Test 09/19/22  0735 09/15/22  0911 09/15/22  0706 09/14/22 0410 09/12/22  0637 05/20/20  0415 05/19/20  1254 05/19/20  0922 05/19/20  0850 05/19/20  0508 05/18/20 2025 05/18/20 2015 08/08/16  0648 08/04/16  0758   * 116* 135* 127* 132*   < >  --   --    < >  --    < >  --    < >  --    BGM  --   --   --   --   --   --  99 108*  --  111*  --  101*  --  107*    < > = values in this interval not displayed.     Recent Labs   Lab 09/15/22  0706 09/14/22 0409 09/13/22  2259   WBC 15.1* 17.2*  --    LACT  --   --  1.1         No results found for this or any previous visit (from the past 24 hour(s)).    Medications   All medications were reviewed.    dextrose       Reason anticoagulant not prescribed for atrial fibrillation       - MEDICATION INSTRUCTIONS -         acetaminophen  975 mg Per G Tube TID     apixaban ANTICOAGULANT  5 mg Per G Tube BID     atorvastatin  40 mg Per G Tube At Bedtime     banatrol plus  1 packet Per Feeding Tube TID     budesonide  4.5 mg Per G Tube BID     calcium carbonate 600 mg-vitamin D 400 units  1 tablet Per G Tube BID     clopidogrel  75 mg Per G Tube Daily     digoxin  125 mcg Per G Tube Daily     ferrous sulfate  220 mg Per G Tube Daily     fiber modular (NUTRISOURCE FIBER)  1 packet Per  Feeding Tube Daily     gabapentin  300 mg Per G Tube QAM     gabapentin  600 mg Per G Tube At Bedtime     mirtazapine  15 mg Per G Tube At Bedtime     multivitamin  with lutein  1 capsule Per G Tube Daily     sodium chloride (PF)  3 mL Intracatheter Q8H     dextrose, HYDROmorphone, ipratropium - albuterol 0.5 mg/2.5 mg/3 mL, lidocaine 4%, lidocaine (buffered or not buffered), loperamide, melatonin, metoprolol, naloxone **OR** naloxone **OR** naloxone **OR** naloxone, Reason anticoagulant not prescribed for atrial fibrillation, ondansetron **OR** ondansetron, oxyCODONE, - MEDICATION INSTRUCTIONS -, prochlorperazine **OR** prochlorperazine **OR** prochlorperazine, sodium chloride (PF)

## 2022-09-20 NOTE — PROGRESS NOTES
Page to Dr. Tinsley regarding CXR results looking like pneumonia is worsening.    CBC and Procal ordered.

## 2022-09-20 NOTE — CONSULTS
Care Management Follow Up    Length of Stay (days): 15    Expected Discharge Date: 09/20/2022     Concerns to be Addressed:  Discharge planning  Patient plan of care discussed at interdisciplinary rounds: Yes    Anticipated Discharge Disposition: patient/family declined TCU wanting home with home infusion/homecare and dme      Anticipated Discharge Services: Transportation Services  Anticipated Discharge DME:  Hospital     Patient/family educated on Medicare website which has current facility and service quality ratings:  n/a  Education Provided on the Discharge Plan:  yes  Patient/Family in Agreement with the Plan: yes    Referrals Placed by CM/SW:  CM  Private pay costs discussed: Not applicable    Additional Information:  Writer attempted to call son Efrem by phone per his request of dimensions of hospital bed. Writer left message with the following information:  Length: 80 inches (6.6 feet long)  Width: 36 inches (3 feet wide)  Writer asked Efrem to call for update on when he would have his fathers room available for DME equipment.    Writer also called [employer] phone [phone] they have informed this writer that the only days they deliver hospital beds are Monday and Friday so they would need notice by this Thursday 9/22/22 for possible discharge on Friday.  Per note from bedside xray results submitted to peace LEMOS.  Update emailed to [facility] Liaison as well.    Will continue to follow for discharge       Tyra Davis RN, BSN, ACM   Care Transitions Specialist   [facility]  Care Transitions Specialist   [address] [place]. [address]  [email or web address]  Office:[phone] Fax: [phone]  [facility]

## 2022-09-21 NOTE — PROGRESS NOTES
CLINICAL NUTRITION SERVICES - REASSESSMENT NOTE      Malnutrition: (9/8)  % Weight Loss:  > 10% in 6 months (severe malnutrition)  % Intake:  </= 75% for >/= 1 month (severe malnutrition)   Subcutaneous Fat Loss:  Orbital region moderate depletion and Upper arm region moderate depletion  Muscle Loss:  Temporal region moderate-severe depletion, Clavicle bone region moderate depletion, Dorsal hand region moderate depletion, Patellar region moderate-severe depletion, Anterior thigh region moderate depletion and Posterior calf region moderate-severe depletion  Fluid Retention:  None noted     Malnutrition Diagnosis: Severe malnutrition  In Context of:  Acute illness or injury  Chronic illness or disease       EVALUATION OF PROGRESS TOWARD GOALS   Diet:    NPO    Nutrition Support:    Type of Feeding Tube: G-tube  Enteral Frequency:  Continuous  Enteral Regimen: 2CalHN @ 30 mL/hr (goal rate)  Total Enteral Provisions: 1440 kcal, 60 g protein, 158 g CHO, 3.6 g fiber, 504 mL free water.   1 packet Nutrisource Fiber daily = 15 cals, 3 gm fiber  1 packet Banatrol (prebiotic fiber) TID  = 120 cals, 6 gm fiber  TOTAL: 1575 cals (33 cals/kg), 60 gm pro (1.25 gm/kg), 12 gm fiber     Free Water Flush: 160 mL every 4 hrs  TOTAL (TF + flushes) = 1464 mL free water/day     Daily multivitamin    Intake/Tolerance:    Chart reviewed  Tolerating TF at goal rate    BM x2 past 24 hrs    9/21: K 3.8           Mg 1.9        ASSESSED NUTRITION NEEDS:  Dosing Weight 47.9 kg (9/8)   Estimated Energy Needs: 9937-3931 kcals (30-35 Kcal/Kg)  Justification: repletion and underweight  Estimated Protein Needs: 58-96 grams protein (1.2-2 g pro/Kg)  Justification: maintenance      NEW FINDINGS:     Plan for discharge to son's home once arrangements are in place   Pt will be followed by RD through [facility] - TF will be managed by that team    Net I/O:  +11,635 mL  ?? 15 # wt gain past 2 weeks    09/21/22 0700 54.9 kg (121 lb) Bed scale   09/20/22 0459 53.5  kg (118 lb) Bed scale   09/19/22 0638 56.7 kg (125 lb) Bed scale   09/18/22 0515 56.3 kg (124 lb 1.9 oz) Bed scale   09/17/22 0455 55.6 kg (122 lb 8 oz) Bed scale   09/13/22 0555 54.4 kg (120 lb) Bed scale   09/11/22 0640 49 kg (108 lb) Bed scale   09/09/22 0608 48.2 kg (106 lb 4.8 oz) Bed scale   09/08/22 0356 47.9 kg (105 lb 9.6 oz) Bed scale   09/07/22 0615 48.4 kg (106 lb 9.6 oz) Bed scale           Previous Goals (9/16):   EN to meet % estimated needs  Evaluation: Met    Previous Nutrition Diagnosis (9/16):   No nutrition diagnosis identified at this time as EN meeting estimated needs  Evaluation: No change        CURRENT NUTRITION DIAGNOSIS  No nutrition diagnosis identified at this time as EN meeting estimated needs    INTERVENTIONS  Recommendations / Nutrition Prescription  NPO    Recommend continue with current EN regimen    Ordered current wt (standing if pt is able)      Goals  EN to meet % estimated needs      MONITORING AND EVALUATION:  Progress towards goals will be monitored and evaluated per protocol and Practice Guidelines

## 2022-09-21 NOTE — PLAN OF CARE
Goal Outcome Evaluation:          Overall Patient Progress: no change    Outcome Evaluation: Pt tolerating TF at goal  - 2CalHN @ 30 mL/hr.  Plan to continue with current regimen at home (pt will be followed by [facility] dietitian for continued management)

## 2022-09-21 NOTE — PLAN OF CARE
Pt had a good evening. Vitals stable. A&O. Up to chair and ambulated in room w/ 1 assist gb/walker. Repos in bed. NPO. TF to g tube running at 30cc. Voiding per urinal, BM x1 this shift. Wound cares to coccyx completed. Plan for pt to discharge to home w/ son and C, awaiting equipment.

## 2022-09-21 NOTE — PLAN OF CARE
[id number]    AOx4.  VSS on RA.  A1 GB/W.  Q2h turns in bed.  Strict NPO.  G tube, TF at 30mL/hr.  Continent, voids in urinal.  Mepi on coccyx.  Discharge to home pending equipment logistics.

## 2022-09-21 NOTE — PROGRESS NOTES
[facility]    Internal Medicine Hospitalist Progress Note  09/21/2022  I evaluated patient on the above date.    Mj Tinsley Jr., MD  [phone] (p)  Text Page  Vocera        Assessment & Plan New actions/orders today (09/21/2022) are underlined.    Efrem Ramos is a 77 year old male with PMHx including pulmonary fibrosis; chronic dysphagia with h/o aspiration pneumonia; severe AS; CAD; PAD; afib/flutter; stroke; MEL; MGUS; ACD; and recent hospitalization (7/21-8/5/2022) for failure to thrive with malnutrition related to dysphagia, s/p PEG tube (7/28/2022). He presented from TCU 9/5/2022 with ongoing diarrhea and worsening cough and is was to have atrial fibrillation with rapid ventricular rate, possible aspiration pneumonia with right-sided infiltrate. He was admitted on 9/5/2022 for ongoing evaluation and care.     On initial evaluation 9/5, pt was afebrile, tachycardic; ECG showed afib with RVR, iRBBB; WBC 18.3, hgb 9.3, NTP-BNP 4330, procalcitonin <0.05, lactate normal. CXR 9/5 showed persistent infiltrates throughout the right long, which appeared slightly increased, as compared to the previous chest CT (7/2022); findings were superimposed on a background of pulmonary fibrosis; blunting of the right costophrenic angle appeared chronic.       Pneumonia, suspect aspiration.  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  * Pt with h/o dysphagia and chronic aspiration s/p PEG-tube 7/28.  * Initial presentation as above. Given antibiotics in ED; not continued after admit given suspected chronic findings on presentation. BC's 9/5 NG.  * CT chest 9/7 showed new patchy consolidative opacities in both lungs, right greater than left, noted this most likely represented pneumonia superimposed on the patient's background fibrosis.  * Started on ceftriaxone and metronidazole 9/8.  * Sputum 9/10 orally contaminated.  * Ceftriaxone  changed fo cefuroxime 9/12.  * Completed course of treatment with cefuroxime and metronidazole on 9/17.   * On 9/20, pt's son noted pt coughing more; repeat CXR ordered at son's request 9/20 showed slight worsening in the right lung superimposed on diffuse interstitial pulmonary fibrosis. Procalcitonin 9/20 <0.05.  -- Start cefdinir for 7d course..  -- O2 sats stable on RA.  -- Cont PRN ipratropium-albuterol nebs.     Acute on chronic CHF exacerbation (valvular).  Afib with RVR contributing or related to above.  Severe AS.  Severe TR.  * PTA on apixaban 5 mg BID and digoxin 125 mcg daily. Pt with chronic hypotension, limiting cardiac meds.  * Initial presentation as above. Cardiology consulted on admit.  * Echo 9/6 showed LVEF 60-65%; RV moderate to severely dilated with normal RV systolic function; signs of RV pressure/volume overload; severe AS; severe TR; mild-moderate MR; pHTN present; trivial pericardial effusion.  * Cardiology evaluated patient and recommended medical management for his severe aortic stenosis since he is not a candidate for TAVR due to his various underlying medical problems (from Cardiology note, was seen in 11/2021 and deemed too high risk given frailty). They also recommended palliative and hospice consult due to his progressive end-stage aortic stenosis.   * Patient refused hospice consult and wanted to proceed with aggressive measures.  * CT chest 9/7 showed findings more consistent with pneumonia on top of pulmonary fibrosis (see below).  * Telemetry dc'd 9/16.  -- Cont apixaban; digoxin 125 mcg daily.  -- Follow-up with Cardiology outpatient.     Dysphagia with suspected chronic aspiration, s/p PEG-tube placement (7/28/2022).  * Had a percutaneous G-tube placed via surgical team 7/28 for chronic aspiration and failure to thrive; IR was not an option as colon was overlying stomach.  -- Cont TF's via PEG tube.  -- Change/adjust formula as needed, given diarrhea (see below).     Persistent  diarrhea, suspect related to TF's, question related to collagenous colitis, improved.  H/o collagenous colitis.  * On admit, patient reported diarrhea has been an ongoing issue since PEG-tube placement. On admit, he expressed an interest in colostomy so that he might avoid ongoing episodes of diarrhea and need for pericares; it was discussed with patient that a colostomy in this instance would be for quality of life, and an elective procedure and with his valvular heart failure and tenuous respiratory status with chronic aspiration, would be a poor surgical candidate for this, and focus should be on attempting to adjust to pain formula to treat likely diarrhea associated with tube feeds versus determine underlying etiology of diarrhea and provide treatment. On admit, noted that in 2005, patient had a cecal biopsy with pathology consistent with collagenous colitis.  * PRN loperamide increased on admit. Nutrition consulted to consider addition of fiber to feed regimen given persistent diarrhea. [facility] consulted on admit.  * C. Difficile, enteric panel negative 9/6. Seen by GI 9/6 who recommended adding budesonide, however it is enteric coated and could not be given via PEG-tube. Added fiber packet 9/6.  * On 9/8, noted that diarrhea improved.  * Budesonide suspension added 9/9.  -- Cont budesonide suspension BID; fiber packet daily.  -- Cont PRN loperamide 4 mg.     Anemia, suspect chronic component.  * Hgb 9.3 on admit. No overt clinical signs of major bleeding.  * Hgb stable at 8-9 this stay.  Recent Labs   Lab 09/21/22  0645 09/15/22  0706   HGB 9.4* 8.8*   -- Monitor CBC periodically as needed.     Thrombocytosis, suspect reactive.  * Plts 602K on admit 9/5.  Recent Labs   Lab 09/21/22  0645 09/15/22  0706    496*   -- Treat other issues as noted.  -- Monitor CBC.     Stage I sacral pressure ulceration, present on admission.    * On admit, noted nonblanching erythema present; no open sores appreciated. WOC RN  consulted.  -- Cont local wound cares, frequent repositioning.     Weakness and physical deconditioning due to multiple acute and chronic medical issues.  * Recent prolonged hospitalization at [facility] (7/21-8/5/2022) as above. Was at TCU PTA.  -- Increase activity as able.  -- OOB to chair at least 3x/day.  -- Cont PT/OT, discharge plans as below.     Coronary artery disease.  * Known CAD with history of extensive proximal to distal RCA stenting; patent on last angiogram in May 2020.  -- Cont clopidogrel.     CVA history.  -- Cont apixaban, atorvastatin.     MGUS.  * February 2022 bone marrow biopsy with hypercellular marrow including 7% plasma cells. Followed w/ q6 month SPEP through oncology service.  -- Follow-up outpatient.     Clinically Significant Risk Factors Present on Admission                        COVID-19 testing.  COVID-19 PCR Results    COVID-19 PCR Results 1/10/22 1/18/22 7/21/22 7/29/22 9/5/22 9/13/22   SARS CoV2 PCR Negative Negative Negative Negative Negative Negative      Comments are available for some flowsheets but are not being displayed.         COVID-19 Antibody Results, Testing for Immunity    COVID-19 Antibody Results, Testing for Immunity   No data to display.             Diet: NPO for Medical/Clinical Reasons Except for: NPO but receiving Tube Feeding  Adult Formula Drip Feeding: Continuous TwoCal HN; Gastrostomy; Goal Rate: 30; mL/hr; Medication - Feeding Tube Flush Frequency: At least 15-30 mL water before and after medication administration and with tube clogging; Amount to Send (Nutrition us...  Diet    Prophylaxis: PCD's, ambulation. On apixaban.  Bravo Catheter: Not present  Central Lines: None  Code Status: Full Code    Disposition Plan   Expected discharge: Recommended to home with son pending above.  Remains medically stable to discharge as of 9/13. Had been planning for TCU stay but note concerns with cost (see SW note from 9/15). Patient subsequently elected to discharge home  with son (son agreeable) and home care services. Will need hospital bed, bedside commode and home care services (home infusion for TFs and PT/OT, RN, HHA). Orders placed 9/16. CC following.      Discharge home once patient's son has appropriate medical equipment and home care services coordinated -- likely tomorrow 9/22; pt high risk for quick re-admission.     Entered: Mj Tinsley MD 09/21/2022, 3:13 PM       Communication.  - I d/w pt's son 9/21.      Interval History   Just woke up.  Doing about the same.  Up in chair a few times yesterday.  Did get up to try to ambulate yesterday.    -Data reviewed today: I reviewed all new labs and imaging over the last 24 hours. I personally reviewed no images or EKG's today.    Physical Exam    , Blood pressure 106/65, pulse 80, temperature 98.2  F (36.8  C), temperature source Oral, resp. rate 16, weight 54.9 kg (121 lb), SpO2 100 %. O2 Device: None (Room air)    Vitals:    09/19/22 0638 09/20/22 0459 09/21/22 0700   Weight: 56.7 kg (125 lb) 53.5 kg (118 lb) 54.9 kg (121 lb)     Vital Signs with Ranges  Temp:  [96.6  F (35.9  C)-98.2  F (36.8  C)] 98.2  F (36.8  C)  Pulse:  [80-90] 80  Resp:  [16] 16  BP: ()/(58-73) 106/65  SpO2:  [92 %-100 %] 100 %  Patient Vitals for the past 24 hrs:   BP Temp Temp src Pulse Resp SpO2 Weight   09/21/22 0807 106/65 98.2  F (36.8  C) Oral 80 16 100 % --   09/21/22 0700 -- -- -- -- -- -- 54.9 kg (121 lb)   09/21/22 0100 95/58 97  F (36.1  C) Axillary 90 16 92 % --   09/20/22 1536 -- (!) 96.6  F (35.9  C) Oral -- -- -- --   09/20/22 1535 101/73 -- -- 87 16 96 % --     I/O's Last 24 hours  I/O last 3 completed shifts:  In: 1610 [NG/GT:1400]  Out: 1125 [Urine:1125]    Constitutional: Awake, alert, pleasant, fatigued.  Respiratory: Diminished in bases. Scattered rhonchi, crackles, no wheezes.  Cardiovascular: RRR, +I/VI systolic m, no g/r.  GI: Soft, nt, nd, +BS.  Skin/Integumen:   Other:        Data   Recent Labs   Lab  09/21/22  0645 09/20/22  0733 09/19/22  2127 09/19/22  1519 09/19/22  0735 09/18/22  0816 09/15/22  0911 09/15/22  0706   WBC 14.4*  --   --   --   --   --   --  15.1*   HGB 9.4*  --   --   --   --   --   --  8.8*   *  --   --   --   --   --   --  100     --   --   --   --   --   --  496*   NA  --   --   --   --  139  --   --  137   POTASSIUM 3.8 3.9 3.7   < > 2.9*  --   --  3.7  3.6   CHLORIDE  --   --   --   --  107  --   --  107   CO2  --   --   --   --  26  --   --  27   BUN  --   --   --   --  16  --   --  15   CR  --   --   --   --  0.43* 0.46*  --  0.48*   ANIONGAP  --   --   --   --  6  --   --  3   ULISSES  --   --   --   --  8.1*  --   --  8.2*   GLC  --   --   --   --  131*  --  116* 135*    < > = values in this interval not displayed.     Recent Labs   Lab Test 09/19/22  0735 09/15/22  0911 09/15/22  0706 09/14/22  0410 09/12/22  0637 05/20/20  0415 05/19/20  1254 05/19/20  0922 05/19/20  0850 05/19/20  0508 05/18/20 2025 05/18/20 2015 08/08/16  0648 08/04/16  0758   * 116* 135* 127* 132*   < >  --   --    < >  --    < >  --    < >  --    BGM  --   --   --   --   --   --  99 108*  --  111*  --  101*  --  107*    < > = values in this interval not displayed.     Recent Labs   Lab 09/21/22  0645 09/20/22  1619 09/20/22  1516 09/15/22  0706   WBC 14.4*  --   --  15.1*   LACT  --  1.1  --   --    PCAL  --   --  <0.05  --          No results found for this or any previous visit (from the past 24 hour(s)).    Medications   All medications were reviewed.    dextrose       Reason anticoagulant not prescribed for atrial fibrillation       - MEDICATION INSTRUCTIONS -         acetaminophen  975 mg Per G Tube TID     apixaban ANTICOAGULANT  5 mg Per G Tube BID     atorvastatin  40 mg Per G Tube At Bedtime     banatrol plus  1 packet Per Feeding Tube TID     budesonide  4.5 mg Per G Tube BID     calcium carbonate 600 mg-vitamin D 400 units  1 tablet Per G Tube BID     cefdinir  300 mg Oral or  Feeding Tube Q12H [facility] (08/20)     clopidogrel  75 mg Per G Tube Daily     digoxin  125 mcg Per G Tube Daily     ferrous sulfate  220 mg Per G Tube Daily     fiber modular (NUTRISOURCE FIBER)  1 packet Per Feeding Tube Daily     gabapentin  300 mg Per G Tube QAM     gabapentin  600 mg Per G Tube At Bedtime     mirtazapine  15 mg Per G Tube At Bedtime     multivitamin  with lutein  1 capsule Per G Tube Daily     sodium chloride (PF)  3 mL Intracatheter Q8H     dextrose, HYDROmorphone, ipratropium - albuterol 0.5 mg/2.5 mg/3 mL, lidocaine 4%, lidocaine (buffered or not buffered), loperamide, melatonin, metoprolol, naloxone **OR** naloxone **OR** naloxone **OR** naloxone, Reason anticoagulant not prescribed for atrial fibrillation, ondansetron **OR** ondansetron, oxyCODONE, - MEDICATION INSTRUCTIONS -, prochlorperazine **OR** prochlorperazine **OR** prochlorperazine, sodium chloride (PF)

## 2022-09-21 NOTE — PROGRESS NOTES
Care Management Follow Up    Length of Stay (days): 16    Expected Discharge Date: 09/23/2022     Concerns to be Addressed:     Delivery of DME  Patient plan of care discussed at interdisciplinary rounds: Yes    Anticipated Discharge Disposition: Home with Home Care (TCU recommended - refused)     Anticipated Discharge Services: Transportation Services  Anticipated Discharge DME:  Hospital bed and bedside commode     Patient/family educated on Medicare website which has current facility and service quality ratings:  Yes, medicare.gov/care-compare  Education Provided on the Discharge Plan:  es  Patient/Family in Agreement with the Plan: yes    Referrals Placed by CM/SW:    Private pay costs discussed: Not applicable    Additional Information:  Called pt son [phone] to update son: DME (hospital bed, bedside commode) can be delivered by [facility] on Friday 9/23.  Son states he has been working on making room in pt's desired room for this DME.     (Son describes recently moving out of apartment of 25 years to help care for dad, and also helping 'pack rat' parents with moving things--he had 2 storage lockers already for selling things on [employer] but got a 3rd and has filled it; son has been sleeping on a couch.)     Son requested hospital room phone number so he could call his dad. He could not find a pen, allowed CM RN to text it from CM RN phone, pt son appreciative.  Son requested dimensions of hospital bed to be delivered, this was left in VM yesterday; son states he does not always check VM.  CM RN texted dimensions to son: bed length 80 inches (6.6 feet) width 36 inches (3 feet), along with [facility] phone number ([phone]).     Son described pt's journey with Pneumonia and wanted some reassurance / a medical update.   Updated Hospitalist who confirmed.     Anticipating discharge Friday 9/23.  May need to offer stretcher ride; pt will have a flight of stairs to get to the room.   At this time therapy is  "recommending Ax2 on stairs. [facility] for TF, [facility] for HC--added contact info to AVS via discharge tab.        NTAHALIE-RN did give [employer] an update re: discharge expected Friday, they did put it on their delivery schedule and expect \"afternoon\" delivery.     Bella Tay RN, BSN, PHN  [facility]  Inpatient Care Management - FLOAT  IP NATHALIE ZAYAS Mobile: [phone] daily 7:30-4:00      " No

## 2022-09-21 NOTE — PROGRESS NOTES
Reason for admission: Presented from TCU with ongoing diarrhea, worsening cough, and found to have atrial fibrillation with rapid ventricular rate.   Surgical Procedure/s: s/p PEG tube (7/28/2022) s/t failure to thrive with malnutrition related to dysphagia   Mental Status: x4  Activity: Ax1 GB and walker  Diet: NPO but receiving tube feeding.   Pain: Denies  Urination:  Tele/Restraints/Iso: NA  LDA: PIV SL. PEG tube.  Expected D/C Date: Home w/ home care service pending services pending needed at home medical equipment probably on 9/23 Friday.  Other Info: PMHx including pulmonary fibrosis; chronic dysphagia with h/o aspiration pneumonia; severe AS; CAD; PAD; afib/flutter; stroke; EML; MGUS; ACD. On K MG protocol, result today WNL. WBS trending down. Hgb trending up. Receiving continuous enteral feeding of  TwoCalHN at 30 ml/hr w/ 160 q4 water flushes. On pulmonary toilet therapy q shift.

## 2022-09-22 NOTE — PLAN OF CARE
Goal Outcome Evaluation:          9/21/22 [id number]  Pt A/O x4. VSS on RA. PIV SL. Sepsis protocol fired, lactic 1.2. Cont B/B, urinal at bedside, up to BSC. NPO, TF via GT infusing at 30 mL/hr w/ q4h 160 mL FWF. T/R q2h. Up A1 w/ GB + W. Mepilex on coccyx for sacral wound. Plan to discharge home w/ son pending medical equipment arrival, likely on 9/23 in afternoon.

## 2022-09-22 NOTE — PROGRESS NOTES
[facility]    Internal Medicine Hospitalist Progress Note  09/22/2022  I evaluated patient on the above date.    Mj Tinsley Jr., MD  [phone] (p)  Text Page  Vocera        Assessment & Plan New actions/orders today (09/22/2022) are underlined.    Efrem Ramos is a 77 year old male with PMHx including pulmonary fibrosis; chronic dysphagia with h/o aspiration pneumonia; severe AS; CAD; PAD; afib/flutter; stroke; MEL; MGUS; ACD; and recent hospitalization (7/21-8/5/2022) for failure to thrive with malnutrition related to dysphagia, s/p PEG tube (7/28/2022). He presented from TCU 9/5/2022 with ongoing diarrhea and worsening cough and is was to have atrial fibrillation with rapid ventricular rate, possible aspiration pneumonia with right-sided infiltrate. He was admitted on 9/5/2022 for ongoing evaluation and care.     On initial evaluation 9/5, pt was afebrile, tachycardic; ECG showed afib with RVR, iRBBB; WBC 18.3, hgb 9.3, NTP-BNP 4330, procalcitonin <0.05, lactate normal. CXR 9/5 showed persistent infiltrates throughout the right long, which appeared slightly increased, as compared to the previous chest CT (7/2022); findings were superimposed on a background of pulmonary fibrosis; blunting of the right costophrenic angle appeared chronic.       Pneumonia, suspect aspiration.  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  * Pt with h/o dysphagia and chronic aspiration s/p PEG-tube 7/28.  * Initial presentation as above. Given antibiotics in ED; not continued after admit given suspected chronic findings on presentation. BC's 9/5 NG.  * CT chest 9/7 showed new patchy consolidative opacities in both lungs, right greater than left, noted this most likely represented pneumonia superimposed on the patient's background fibrosis.  * Started on ceftriaxone and metronidazole 9/8.  * Sputum 9/10 orally contaminated.  * Ceftriaxone  changed fo cefuroxime 9/12.  * Completed course of treatment with cefuroxime and metronidazole on 9/17.   * On 9/20, pt's son noted pt coughing more; repeat CXR ordered at son's request 9/20 showed slight worsening in the right lung superimposed on diffuse interstitial pulmonary fibrosis. Procalcitonin 9/20 <0.05.  * Started on cefdinir 9/21.  -- Continue cefdinir (started 9/21) for 7 days.  -- O2 sats remain stable on RA 9/22.  -- Cont PRN ipratropium-albuterol nebs.     Acute on chronic CHF exacerbation (valvular).  Afib with RVR contributing or related to above.  Severe AS.  Severe TR.  * PTA on apixaban 5 mg BID and digoxin 125 mcg daily. Pt with chronic hypotension, limiting cardiac meds.  * Initial presentation as above. Cardiology consulted on admit.  * Echo 9/6 showed LVEF 60-65%; RV moderate to severely dilated with normal RV systolic function; signs of RV pressure/volume overload; severe AS; severe TR; mild-moderate MR; pHTN present; trivial pericardial effusion.  * Cardiology evaluated patient and recommended medical management for his severe aortic stenosis since he is not a candidate for TAVR due to his various underlying medical problems (from Cardiology note, was seen in 11/2021 and deemed too high risk given frailty). They also recommended palliative and hospice consult due to his progressive end-stage aortic stenosis.   * Patient refused hospice consult and wanted to proceed with aggressive measures.  * CT chest 9/7 showed findings more consistent with pneumonia on top of pulmonary fibrosis (see below).  * Telemetry dc'd 9/16.  -- Cont apixaban; digoxin 125 mcg daily.  -- Follow-up with Cardiology outpatient.     Dysphagia with suspected chronic aspiration, s/p PEG-tube placement (7/28/2022).  * Had a percutaneous G-tube placed via surgical team 7/28 for chronic aspiration and failure to thrive; IR was not an option as colon was overlying stomach.  -- Cont TF's via PEG tube.  -- Change/adjust  formula as needed, given diarrhea (see below).     Persistent diarrhea, suspect related to TF's, question related to collagenous colitis, improved.  H/o collagenous colitis.  * On admit, patient reported diarrhea has been an ongoing issue since PEG-tube placement. On admit, he expressed an interest in colostomy so that he might avoid ongoing episodes of diarrhea and need for pericares; it was discussed with patient that a colostomy in this instance would be for quality of life, and an elective procedure and with his valvular heart failure and tenuous respiratory status with chronic aspiration, would be a poor surgical candidate for this, and focus should be on attempting to adjust to pain formula to treat likely diarrhea associated with tube feeds versus determine underlying etiology of diarrhea and provide treatment. On admit, noted that in 2005, patient had a cecal biopsy with pathology consistent with collagenous colitis.  * PRN loperamide increased on admit. Nutrition consulted to consider addition of fiber to feed regimen given persistent diarrhea. [facility] consulted on admit.  * C. Difficile, enteric panel negative 9/6. Seen by GI 9/6 who recommended adding budesonide, however it is enteric coated and could not be given via PEG-tube. Added fiber packet 9/6.  * On 9/8, noted that diarrhea improved.  * Budesonide suspension added 9/9.  -- Cont budesonide suspension BID; fiber packet daily.  -- Cont PRN loperamide 4 mg.     Anemia, suspect chronic component.  * Hgb 9.3 on admit. No overt clinical signs of major bleeding.  * Hgb stable at 8-9 this stay.  Recent Labs   Lab 09/21/22  0645   HGB 9.4*   -- Monitor CBC periodically as needed.     Thrombocytosis, suspect reactive.  * Plts 602K on admit 9/5.  Recent Labs   Lab 09/21/22  0645      -- Treat other issues as noted.  -- Monitor CBC.     Stage I sacral pressure ulceration, present on admission.    * On admit, noted nonblanching erythema present; no open  sores appreciated. [facility] RN consulted.  -- Cont local wound cares, frequent repositioning.     Weakness and physical deconditioning due to multiple acute and chronic medical issues.  * Recent prolonged hospitalization at [facility] (7/21-8/5/2022) as above. Was at TCU PTA.  -- Increase activity as able.  -- OOB to chair at least 3x/day.  -- Cont PT/OT, discharge plans as below.     Coronary artery disease.  * Known CAD with history of extensive proximal to distal RCA stenting; patent on last angiogram in May 2020.  -- Cont clopidogrel.     CVA history.  -- Cont apixaban, atorvastatin.     MGUS.  * February 2022 bone marrow biopsy with hypercellular marrow including 7% plasma cells. Followed w/ q6 month SPEP through oncology service.  -- Follow-up outpatient.     Clinically Significant Risk Factors Present on Admission                        COVID-19 testing.  COVID-19 PCR Results    COVID-19 PCR Results 1/10/22 1/18/22 7/21/22 7/29/22 9/5/22 9/13/22   SARS CoV2 PCR Negative Negative Negative Negative Negative Negative      Comments are available for some flowsheets but are not being displayed.         COVID-19 Antibody Results, Testing for Immunity    COVID-19 Antibody Results, Testing for Immunity   No data to display.             Diet: NPO for Medical/Clinical Reasons Except for: NPO but receiving Tube Feeding  Adult Formula Drip Feeding: Continuous TwoCal HN; Gastrostomy; Goal Rate: 30; mL/hr; Medication - Feeding Tube Flush Frequency: At least 15-30 mL water before and after medication administration and with tube clogging; Amount to Send (Nutrition us...  Diet    Prophylaxis: PCD's, ambulation. On apixaban.  Bravo Catheter: Not present  Central Lines: None  Code Status: Full Code    Disposition Plan   Expected discharge: Recommended to home with son anticipate 9/23.  Remains medically stable to discharge as of 9/13. Had been planning for TCU stay but note concerns with cost (see SW note from 9/15). Patient subsequently  elected to discharge home with son (son agreeable) and home care services. Will need hospital bed, bedside commode and home care services (home infusion for TFs and PT/OT, RN, HHA). Orders placed 9/16. CC following.      Discharge home once patient's son has appropriate medical equipment and home care services coordinated -- likely tomorrow 9/23 when home equipment will come in; pt high risk for quick re-admission.     Entered: Mj Tinsley MD 09/22/2022, 7:50 AM       Communication.  - I d/w pt's son 9/22.      Interval History    Was up and ambulating with PT yesterday.  Had an accident (diarrhea) overnight.  Otherwise, cough seems better.    -Data reviewed today: I reviewed all new labs and imaging over the last 24 hours. I personally reviewed no images or EKG's today.    Physical Exam    , Blood pressure 108/72, pulse 87, temperature 97.7  F (36.5  C), temperature source Oral, resp. rate 16, weight 47.4 kg (104 lb 8 oz), SpO2 92 %. O2 Device: None (Room air)    Vitals:    09/20/22 0459 09/21/22 0700 09/22/22 0531   Weight: 53.5 kg (118 lb) 54.9 kg (121 lb) 47.4 kg (104 lb 8 oz)     Vital Signs with Ranges  Temp:  [97.3  F (36.3  C)-98.2  F (36.8  C)] 97.7  F (36.5  C)  Pulse:  [80-97] 87  Resp:  [16] 16  BP: ()/(59-72) 108/72  SpO2:  [92 %-100 %] 92 %  Patient Vitals for the past 24 hrs:   BP Temp Temp src Pulse Resp SpO2 Weight   09/22/22 0731 108/72 97.7  F (36.5  C) Oral 87 16 92 % --   09/22/22 0531 -- -- -- -- -- -- 47.4 kg (104 lb 8 oz)   09/21/22 2211 90/59 97.7  F (36.5  C) Oral 97 16 93 % --   09/21/22 1553 109/67 97.3  F (36.3  C) Oral 95 16 97 % --   09/21/22 0807 106/65 98.2  F (36.8  C) Oral 80 16 100 % --     I/O's Last 24 hours  I/O last 3 completed shifts:  In: 1418.5 [NG/GT:1088.5]  Out: 750 [Urine:750]    Constitutional: Awake, alert, pleasant.  Respiratory: Diminished in bases. Bilateral crackles, no wheezes.  Cardiovascular: RRR, +I/VI systolic m, no g/r.  GI: Soft, nt, nd,  +BS.  Skin/Integumen:   Other:        Data   Recent Labs   Lab 09/21/22  0645 09/20/22  0733 09/19/22  2127 09/19/22  1519 09/19/22  0735 09/18/22  0816 09/15/22  0911   WBC 14.4*  --   --   --   --   --   --    HGB 9.4*  --   --   --   --   --   --    *  --   --   --   --   --   --      --   --   --   --   --   --    NA  --   --   --   --  139  --   --    POTASSIUM 3.8 3.9 3.7   < > 2.9*  --   --    CHLORIDE  --   --   --   --  107  --   --    CO2  --   --   --   --  26  --   --    BUN  --   --   --   --  16  --   --    CR  --   --   --   --  0.43* 0.46*  --    ANIONGAP  --   --   --   --  6  --   --    ULISSES  --   --   --   --  8.1*  --   --    GLC  --   --   --   --  131*  --  116*    < > = values in this interval not displayed.     Recent Labs   Lab Test 09/19/22  0735 09/15/22  0911 09/15/22  0706 09/14/22  0410 09/12/22  0637 05/20/20  0415 05/19/20  1254 05/19/20  0922 05/19/20  0850 05/19/20  0508 05/18/20 2025 05/18/20 2015 08/08/16  0648 08/04/16  0758   * 116* 135* 127* 132*   < >  --   --    < >  --    < >  --    < >  --    BGM  --   --   --   --   --   --  99 108*  --  111*  --  101*  --  107*    < > = values in this interval not displayed.     Recent Labs   Lab 09/21/22 2227 09/21/22 0645 09/20/22  1619 09/20/22  1516   WBC  --  14.4*  --   --    LACT 1.2  --  1.1  --    PCAL  --   --   --  <0.05         No results found for this or any previous visit (from the past 24 hour(s)).    Medications   All medications were reviewed.    dextrose       Reason anticoagulant not prescribed for atrial fibrillation       - MEDICATION INSTRUCTIONS -         acetaminophen  975 mg Per G Tube TID     apixaban ANTICOAGULANT  5 mg Per G Tube BID     atorvastatin  40 mg Per G Tube At Bedtime     banatrol plus  1 packet Per Feeding Tube TID     budesonide  4.5 mg Per G Tube BID     calcium carbonate 600 mg-vitamin D 400 units  1 tablet Per G Tube BID     cefdinir  300 mg Oral or Feeding Tube Q12H PAT  (08/20)     clopidogrel  75 mg Per G Tube Daily     digoxin  125 mcg Per G Tube Daily     ferrous sulfate  220 mg Per G Tube Daily     fiber modular (NUTRISOURCE FIBER)  1 packet Per Feeding Tube Daily     gabapentin  300 mg Per G Tube QAM     gabapentin  600 mg Per G Tube At Bedtime     mirtazapine  15 mg Per G Tube At Bedtime     multivitamin  with lutein  1 capsule Per G Tube Daily     sodium chloride (PF)  3 mL Intracatheter Q8H     dextrose, HYDROmorphone, ipratropium - albuterol 0.5 mg/2.5 mg/3 mL, lidocaine 4%, lidocaine (buffered or not buffered), loperamide, melatonin, metoprolol, naloxone **OR** naloxone **OR** naloxone **OR** naloxone, Reason anticoagulant not prescribed for atrial fibrillation, ondansetron **OR** ondansetron, oxyCODONE, - MEDICATION INSTRUCTIONS -, prochlorperazine **OR** prochlorperazine **OR** prochlorperazine, sodium chloride (PF)

## 2022-09-22 NOTE — PLAN OF CARE
No acute changes. Denies pain. NPO. TF at goal rate 30mL/hr. K 3.4, replaced. Recheck= 3.7. Dressing to coccyx changed. Sat up in chair off and on throughout the day. Ambulated in halls x1 and attempted stairs with PT. Assist x1 + walker, gait-belt. Home medical equipment to be delivered tomorrow. Plan for discharge home with son tomorrow.

## 2022-09-23 NOTE — PLAN OF CARE
Goal Outcome Evaluation:    Plan of Care Reviewed With: patient     Overall Patient Progress: improving    Patient AxOx4, VSS on room air, lungs clear diminished, except BP runs soft, pt asymptomatic.  TF at goal rate of 30 ml/hr through G tube.  NPO-mouth care encouraged and supplies available at bedside.  TF site redressed, some dried crusted drainage at entry site, otherwise intact.  No c/o pain. Voids adequate clear yellow urine in urinal.  No BM this shift.  K+ 3.3 replaced, redraw at 1815.  MG+ 1.9.  Up with assist of 1, GB/walker. Pt refused activity up to chair or a walk when offered. Jamir Topete, is waiting for hospital bed/equipment to be delivered to home and will come in for TF teaching at 1700 this evening.

## 2022-09-23 NOTE — PLAN OF CARE
Shift Note 09/22/22 [id number]    Pt A/Ox4. VSS on RA. Denies pain & nausea. Up A1 w/ GB & W. Encouraged offloading while in bed. L PIV SL. Incont at times, using urinal at bedside. No BM this shift. NPO, TF via GT infusing at 30 mL/hr w/ q4h 160 mL FWF. Tolerating diet well. Takes meds through G-Tube. Mepi on coccyx for sacral wound. Plan to discharge home tomorrow w/ son pending medical equipment arrival.

## 2022-09-23 NOTE — PROGRESS NOTES
[facility]    Efrem will discharge to home today with [facility] for home tube feed and [facility] for home RN, PT, OT. [facility] will have a nurse at [facility] today at 5pm for TF teach and hookup to home TF pump. Pt's son updated.     Thank you    Do Concepcion RN  [facility] Liaison  [phone] (Mon thru Fri 8am - 5pm)  [phone] Office

## 2022-09-23 NOTE — CONSULTS
"Care Management Discharge Note    Discharge Date: 09/23/2022    Discharge Disposition: Transitional Care  Discharge Services: Transportation Services  Discharge DME:  Medical bed and commode     Discharge Transportation: family or friend will provide    Private pay costs discussed: transportation costs    PAS Confirmation Code:    Patient/family educated on Medicare website which has current facility and service quality ratings:      Education Provided on the Discharge Plan:    Persons Notified of Discharge Plans: Hospitalist, Charge, Huc  Patient/Family in Agreement with the Plan: yes    Handoff Referral Completed: Yes    Additional Information:  SW called patients son and verified that the DME was delivered. Son will be at hospital at 1700 for training. Physical therapy recommended patient transports in stretcher due to 15 stairs entering home. SW called [employer] to arrange a ride, Parker from transportation stated that since patient can sit in w/c a stretcher is not necessary. SW scheduled w/c ride with chair lift for stairs at 1830. SW discussed the price of wheelchair ride with patient and he instructed SW to call his son. SW called patients son and informed him of transportation pricing. Son stated he will transport patient himself and lift patient up the stairs. Son informed SW that when he is at the hospital today at 1700 he would like \"tips\" on the best way to carry/ move patient.     Corrina Toribio, DAVIE, LGSW      "

## 2022-09-23 NOTE — PROGRESS NOTES
[facility]    Internal Medicine Hospitalist Progress Note  09/23/2022  I evaluated patient on the above date.    Mj Tinsley Jr., MD  [phone] (p)  Text Page  Vocera        Assessment & Plan New actions/orders today (09/23/2022) are underlined.    Efrem Ramos is a 77 year old male with PMHx including pulmonary fibrosis; chronic dysphagia with h/o aspiration pneumonia; severe AS; CAD; PAD; afib/flutter; stroke; MEL; MGUS; ACD; and recent hospitalization (7/21-8/5/2022) for failure to thrive with malnutrition related to dysphagia, s/p PEG tube (7/28/2022). He presented from TCU 9/5/2022 with ongoing diarrhea and worsening cough and is was to have atrial fibrillation with rapid ventricular rate, possible aspiration pneumonia with right-sided infiltrate. He was admitted on 9/5/2022 for ongoing evaluation and care.     On initial evaluation 9/5, pt was afebrile, tachycardic; ECG showed afib with RVR, iRBBB; WBC 18.3, hgb 9.3, NTP-BNP 4330, procalcitonin <0.05, lactate normal. CXR 9/5 showed persistent infiltrates throughout the right long, which appeared slightly increased, as compared to the previous chest CT (7/2022); findings were superimposed on a background of pulmonary fibrosis; blunting of the right costophrenic angle appeared chronic.       Pneumonia, suspect aspiration.  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  * Pt with h/o dysphagia and chronic aspiration s/p PEG-tube 7/28.  * Initial presentation as above. Given antibiotics in ED; not continued after admit given suspected chronic findings on presentation. BC's 9/5 NG.  * CT chest 9/7 showed new patchy consolidative opacities in both lungs, right greater than left, noted this most likely represented pneumonia superimposed on the patient's background fibrosis.  * Started on ceftriaxone and metronidazole 9/8.  * Sputum 9/10 orally contaminated.  * Ceftriaxone  changed fo cefuroxime 9/12.  * Completed course of treatment with cefuroxime and metronidazole on 9/17.   * On 9/20, pt's son noted pt coughing more; repeat CXR ordered at son's request 9/20 showed slight worsening in the right lung superimposed on diffuse interstitial pulmonary fibrosis. Procalcitonin 9/20 <0.05.  * Started on cefdinir 9/21.  -- Continue cefdinir (started 9/21) for 7 days.  -- O2 sats remain stable on RA 9/22.  -- Cont PRN ipratropium-albuterol nebs.     Acute on chronic CHF exacerbation (valvular).  Afib with RVR contributing or related to above.  Severe AS.  Severe TR.  * PTA on apixaban 5 mg BID and digoxin 125 mcg daily. Pt with chronic hypotension, limiting cardiac meds.  * Initial presentation as above. Cardiology consulted on admit.  * Echo 9/6 showed LVEF 60-65%; RV moderate to severely dilated with normal RV systolic function; signs of RV pressure/volume overload; severe AS; severe TR; mild-moderate MR; pHTN present; trivial pericardial effusion.  * Cardiology evaluated patient and recommended medical management for his severe aortic stenosis since he is not a candidate for TAVR due to his various underlying medical problems (from Cardiology note, was seen in 11/2021 and deemed too high risk given frailty). They also recommended palliative and hospice consult due to his progressive end-stage aortic stenosis.   * Patient refused hospice consult and wanted to proceed with aggressive measures.  * CT chest 9/7 showed findings more consistent with pneumonia on top of pulmonary fibrosis (see below).  * Telemetry dc'd 9/16.  -- Cont apixaban; digoxin 125 mcg daily.  -- Follow-up with Cardiology outpatient.     Dysphagia with suspected chronic aspiration, s/p PEG-tube placement (7/28/2022).  * Had a percutaneous G-tube placed via surgical team 7/28 for chronic aspiration and failure to thrive; IR was not an option as colon was overlying stomach.  -- Cont TF's via PEG tube.  -- Change/adjust  formula as needed, given diarrhea (see below).     Persistent diarrhea, suspect related to TF's, question related to collagenous colitis, improved.  H/o collagenous colitis.  * On admit, patient reported diarrhea has been an ongoing issue since PEG-tube placement. On admit, he expressed an interest in colostomy so that he might avoid ongoing episodes of diarrhea and need for pericares; it was discussed with patient that a colostomy in this instance would be for quality of life, and an elective procedure and with his valvular heart failure and tenuous respiratory status with chronic aspiration, would be a poor surgical candidate for this, and focus should be on attempting to adjust to pain formula to treat likely diarrhea associated with tube feeds versus determine underlying etiology of diarrhea and provide treatment. On admit, noted that in 2005, patient had a cecal biopsy with pathology consistent with collagenous colitis.  * PRN loperamide increased on admit. Nutrition consulted to consider addition of fiber to feed regimen given persistent diarrhea. [facility] consulted on admit.  * C. Difficile, enteric panel negative 9/6. Seen by GI 9/6 who recommended adding budesonide, however it is enteric coated and could not be given via PEG-tube. Added fiber packet 9/6.  * On 9/8, noted that diarrhea improved.  * Budesonide suspension added 9/9.  -- Cont budesonide suspension BID; fiber packet daily.  -- Cont PRN loperamide 4 mg.     Anemia, suspect chronic component.  * Hgb 9.3 on admit. No overt clinical signs of major bleeding.  * Hgb stable at 8-9 this stay.  Recent Labs   Lab 09/21/22  0645   HGB 9.4*   -- Monitor CBC periodically as needed.     Thrombocytosis, suspect reactive.  * Plts 602K on admit 9/5.  Recent Labs   Lab 09/21/22  0645      -- Treat other issues as noted.  -- Monitor CBC.     Stage I sacral pressure ulceration, present on admission.    * On admit, noted nonblanching erythema present; no open  sores appreciated. [facility] RN consulted.  -- Cont local wound cares, frequent repositioning.     Weakness and physical deconditioning due to multiple acute and chronic medical issues.  * Recent prolonged hospitalization at [facility] (7/21-8/5/2022) as above. Was at TCU PTA.  -- Increase activity as able.  -- OOB to chair at least 3x/day.  -- Cont PT/OT, discharge plans as below.     Coronary artery disease.  * Known CAD with history of extensive proximal to distal RCA stenting; patent on last angiogram in May 2020.  -- Cont clopidogrel.     CVA history.  -- Cont apixaban, atorvastatin.     MGUS.  * February 2022 bone marrow biopsy with hypercellular marrow including 7% plasma cells. Followed w/ q6 month SPEP through oncology service.  -- Follow-up outpatient.     Clinically Significant Risk Factors Present on Admission                        COVID-19 testing.  COVID-19 PCR Results    COVID-19 PCR Results 1/10/22 1/18/22 7/21/22 7/29/22 9/5/22 9/13/22   SARS CoV2 PCR Negative Negative Negative Negative Negative Negative      Comments are available for some flowsheets but are not being displayed.         COVID-19 Antibody Results, Testing for Immunity    COVID-19 Antibody Results, Testing for Immunity   No data to display.             Diet: NPO for Medical/Clinical Reasons Except for: NPO but receiving Tube Feeding  Adult Formula Drip Feeding: Continuous TwoCal HN; Gastrostomy; Goal Rate: 30; mL/hr; Medication - Feeding Tube Flush Frequency: At least 15-30 mL water before and after medication administration and with tube clogging; Amount to Send (Nutrition us...  Diet    Prophylaxis: PCD's, ambulation. On apixaban.  Bravo Catheter: Not present  Central Lines: None  Code Status: Full Code    Disposition Plan   Expected discharge: Recommended to home with son day 9/23.    Remains medically stable to discharge as of 9/13. Had been planning for TCU stay but note concerns with cost (see SW note from 9/15). Patient subsequently  "elected to discharge home with son (son agreeable) and home care services. Hospital bed, bedside commode and home care services (home infusion for TFs and PT/OT, RN, HHA) ordered. Orders placed 9/16. CC following.      Discharge home today 9/23; home equipment is expected to come in.     Entered: Mj Tinsley MD 09/23/2022, 8:55 AM       Communication.  - I d/w pt's son 9/23.      Interval History    No acute events overnight.  No incontinent stools overnight.  Notes some \"wheezing\" this am.  Otherwise, OK with discharge.    -Data reviewed today: I reviewed all new labs and imaging over the last 24 hours. I personally reviewed no images or EKG's today.    Physical Exam    , Blood pressure 97/67, pulse 90, temperature 98  F (36.7  C), temperature source Oral, resp. rate 18, weight 51.3 kg (113 lb), SpO2 95 %. O2 Device: None (Room air)    Vitals:    09/21/22 0700 09/22/22 0531 09/23/22 0434   Weight: 54.9 kg (121 lb) 47.4 kg (104 lb 8 oz) 51.3 kg (113 lb)     Vital Signs with Ranges  Temp:  [97.3  F (36.3  C)-98  F (36.7  C)] 98  F (36.7  C)  Pulse:  [79-90] 90  Resp:  [16-18] 18  BP: ()/(65-75) 97/67  SpO2:  [92 %-95 %] 95 %  Patient Vitals for the past 24 hrs:   BP Temp Temp src Pulse Resp SpO2 Weight   09/23/22 0747 97/67 98  F (36.7  C) Oral 90 18 95 % --   09/23/22 0434 -- -- -- -- -- -- 51.3 kg (113 lb)   09/23/22 0154 100/65 97.8  F (36.6  C) Oral 79 18 95 % --   09/22/22 1535 115/75 97.3  F (36.3  C) Oral 83 16 92 % --     I/O's Last 24 hours  I/O last 3 completed shifts:  In: 1160 [NG/GT:1160]  Out: 675 [Urine:675]    Constitutional: Awake, alert, pleasant, fatigued.  Respiratory: Diminished in bases. Bilateral crackles, scattered rhonchi.  Cardiovascular: RRR, +I/VI systolic m, no g/r.  GI: Soft, nt, nd, +BS.  Skin/Integumen:   Other:        Data   Recent Labs   Lab 09/23/22  0712 09/22/22  1454 09/22/22  0759 09/21/22  0645 09/19/22  1519 09/19/22  0735 09/18/22  0816   WBC  --   --   --  " 14.4*  --   --   --    HGB  --   --   --  9.4*  --   --   --    MCV  --   --   --  101*  --   --   --    PLT  --   --   --  399  --   --   --    NA  --   --   --   --   --  139  --    POTASSIUM 3.3* 3.7 3.4 3.8   < > 2.9*  --    CHLORIDE  --   --   --   --   --  107  --    CO2  --   --   --   --   --  26  --    BUN  --   --   --   --   --  16  --    CR  --   --   --   --   --  0.43* 0.46*   ANIONGAP  --   --   --   --   --  6  --    ULISSES  --   --   --   --   --  8.1*  --    GLC  --   --   --   --   --  131*  --     < > = values in this interval not displayed.     Recent Labs   Lab Test 09/19/22  0735 09/15/22  0911 09/15/22  0706 09/14/22  0410 09/12/22  0637 05/20/20  0415 05/19/20  1254 05/19/20  0922 05/19/20  0850 05/19/20  0508 05/18/20 2025 05/18/20 2015 08/08/16  0648 08/04/16  0758   * 116* 135* 127* 132*   < >  --   --    < >  --    < >  --    < >  --    BGM  --   --   --   --   --   --  99 108*  --  111*  --  101*  --  107*    < > = values in this interval not displayed.     Recent Labs   Lab 09/21/22  2227 09/21/22  0645 09/20/22  1619 09/20/22  1516   WBC  --  14.4*  --   --    LACT 1.2  --  1.1  --    PCAL  --   --   --  <0.05         No results found for this or any previous visit (from the past 24 hour(s)).    Medications   All medications were reviewed.    dextrose       Reason anticoagulant not prescribed for atrial fibrillation       - MEDICATION INSTRUCTIONS -         acetaminophen  975 mg Per G Tube TID     apixaban ANTICOAGULANT  5 mg Per G Tube BID     atorvastatin  40 mg Per G Tube At Bedtime     banatrol plus  1 packet Per Feeding Tube TID     budesonide  4.5 mg Per G Tube BID     calcium carbonate 600 mg-vitamin D 400 units  1 tablet Per G Tube BID     cefdinir  300 mg Oral or Feeding Tube Q12H [facility] (08/20)     clopidogrel  75 mg Per G Tube Daily     digoxin  125 mcg Per G Tube Daily     ferrous sulfate  220 mg Per G Tube Daily     fiber modular (NUTRISOURCE FIBER)  1 packet Per  Feeding Tube Daily     gabapentin  300 mg Per G Tube QAM     gabapentin  600 mg Per G Tube At Bedtime     mirtazapine  15 mg Per G Tube At Bedtime     multivitamin  with lutein  1 capsule Per G Tube Daily     sodium chloride (PF)  3 mL Intracatheter Q8H     dextrose, HYDROmorphone, ipratropium - albuterol 0.5 mg/2.5 mg/3 mL, lidocaine 4%, lidocaine (buffered or not buffered), loperamide, melatonin, metoprolol, naloxone **OR** naloxone **OR** naloxone **OR** naloxone, Reason anticoagulant not prescribed for atrial fibrillation, ondansetron **OR** ondansetron, oxyCODONE, - MEDICATION INSTRUCTIONS -, prochlorperazine **OR** prochlorperazine **OR** prochlorperazine, sodium chloride (PF)

## 2022-09-23 NOTE — PLAN OF CARE
Goal Outcome Evaluation:     Pt discharging home with son, ADS given,Meds given. Home infusion met with sumane and went over instructions for home care.

## 2022-09-23 NOTE — PLAN OF CARE
Goal Outcome Evaluation:           No acute changes overnight. Calm and cooperative with cares. T/R q2h while in bed. Strict NPO. TF @ goal rate 30ml/hr, Free water flush 160ml q4h. Frequently expels excess oral secretions. LS diminished. On abx suspension, all meds via G tube. Plan for discharge home today with son this afternoon. Home DME being delivered today.

## 2022-09-24 NOTE — PLAN OF CARE
Goal Outcome Evaluation:       Primary Diagnosis: aspiration pneumonia and afib RVR  Orientation: A&Ox4  Aggression Stop Light: Green  Mobility: Assist x1 + gait-belt, walker   Pain Management: Denies  Diet: NPO. TF at 30mL/hr (goal) 160 mL water flush needs to be selene as he is on backpack pump  Bowel/Bladder: Continent, uses urinal at bedside  Abnormal Lab/Assessments: K 3.3, replaced and recheck= 3.9  Drain/Device/Wound: G-tube, L PIV SL  Consults: SW/CC following for discharge home with home care   D/C Day/Goals/Place: Plan for discharge home tomorrow 9/24 am     Shift Note: Samanta.  No acute changes . Calm and cooperative with cares. T/R q2h while in bed. Strict NPO. TF @ goal rate 30ml/hr, Free water flush 160ml q4h. Done selene  Frequently expels excess oral secretions. LS diminished. On abx suspension, all meds via G tube. Plan for discharge home tomorrow with son in the am. Ensure son receives teaching on giving medication before discharge.

## 2022-09-24 NOTE — PROGRESS NOTES
[facility]    Internal Medicine Hospitalist Progress Note  09/24/2022  I evaluated patient on the above date.    Jeronimo Caal MD  [facility]  Contact information available via [facility] Paging/Directory          Assessment & Plan     Efrem Ramos is a 77 year old male with PMHx including pulmonary fibrosis; chronic dysphagia with h/o aspiration pneumonia; severe AS; CAD; PAD; afib/flutter; stroke; MEL; MGUS; ACD; and recent hospitalization (7/21-8/5/2022) for failure to thrive with malnutrition related to dysphagia, s/p PEG tube (7/28/2022). He presented from TCU 9/5/2022 with ongoing diarrhea and worsening cough and is was to have atrial fibrillation with rapid ventricular rate, possible aspiration pneumonia with right-sided infiltrate. He was admitted on 9/5/2022 for ongoing evaluation and care.     On initial evaluation 9/5, pt was afebrile, tachycardic; ECG showed afib with RVR, iRBBB; WBC 18.3, hgb 9.3, NTP-BNP 4330, procalcitonin <0.05, lactate normal. CXR 9/5 showed persistent infiltrates throughout the right long, which appeared slightly increased, as compared to the previous chest CT (7/2022); findings were superimposed on a background of pulmonary fibrosis; blunting of the right costophrenic angle appeared chronic.       Pneumonia, suspect aspiration.  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  * Pt with h/o dysphagia and chronic aspiration s/p PEG-tube 7/28.  * Initial presentation as above. Given antibiotics in ED; not continued after admit given suspected chronic findings on presentation. BC's 9/5 NG.  * CT chest 9/7 showed new patchy consolidative opacities in both lungs, right greater than left, noted this most likely represented pneumonia superimposed on the patient's background fibrosis.  * Started on ceftriaxone and metronidazole 9/8.  * Sputum 9/10 orally contaminated.  *  Ceftriaxone changed fo cefuroxime 9/12.  * Completed course of treatment with cefuroxime and metronidazole on 9/17.   * On 9/20, pt's son noted pt coughing more; repeat CXR ordered at son's request 9/20 showed slight worsening in the right lung superimposed on diffuse interstitial pulmonary fibrosis. Procalcitonin 9/20 <0.05.  * Started on cefdinir 9/21.  -- Continue cefdinir (started 9/21) for 7 days, was drop from the list as ordered only 7 doses instead of 7 days, will resume it to complete 7 days as originally planned.   -- O2 sats remain stable on RA 9/22.  -- Cont PRN ipratropium-albuterol nebs.     Acute on chronic CHF exacerbation (valvular).  Afib with RVR contributing or related to above.  Severe AS.  Severe TR.  * PTA on apixaban 5 mg BID and digoxin 125 mcg daily. Pt with chronic hypotension, limiting cardiac meds.  * Initial presentation as above. Cardiology consulted on admit.  * Echo 9/6 showed LVEF 60-65%; RV moderate to severely dilated with normal RV systolic function; signs of RV pressure/volume overload; severe AS; severe TR; mild-moderate MR; pHTN present; trivial pericardial effusion.  * Cardiology evaluated patient and recommended medical management for his severe aortic stenosis since he is not a candidate for TAVR due to his various underlying medical problems (from Cardiology note, was seen in 11/2021 and deemed too high risk given frailty). They also recommended palliative and hospice consult due to his progressive end-stage aortic stenosis.   * Patient refused hospice consult and wanted to proceed with aggressive measures.  * CT chest 9/7 showed findings more consistent with pneumonia on top of pulmonary fibrosis (see below).  * Telemetry dc'd 9/16.  -- Cont apixaban; digoxin 125 mcg daily.  -- Follow-up with Cardiology outpatient.  Overall stable and CHF is now improved, HR is in good control at this time.      Dysphagia with suspected chronic aspiration, s/p PEG-tube placement  (7/28/2022).  * Had a percutaneous G-tube placed via surgical team 7/28 for chronic aspiration and failure to thrive; IR was not an option as colon was overlying stomach.  -- Cont TF's via PEG tube.  -- Change/adjust formula as needed, given diarrhea (see below).     Persistent diarrhea, suspect related to TF's, question related to collagenous colitis, improved.  H/o collagenous colitis.  * On admit, patient reported diarrhea has been an ongoing issue since PEG-tube placement. On admit, he expressed an interest in colostomy so that he might avoid ongoing episodes of diarrhea and need for pericares; it was discussed with patient that a colostomy in this instance would be for quality of life, and an elective procedure and with his valvular heart failure and tenuous respiratory status with chronic aspiration, would be a poor surgical candidate for this, and focus should be on attempting to adjust to pain formula to treat likely diarrhea associated with tube feeds versus determine underlying etiology of diarrhea and provide treatment. On admit, noted that in 2005, patient had a cecal biopsy with pathology consistent with collagenous colitis.  * PRN loperamide increased on admit. Nutrition consulted to consider addition of fiber to feed regimen given persistent diarrhea. [facility] consulted on admit.  * C. Difficile, enteric panel negative 9/6. Seen by GI 9/6 who recommended adding budesonide, however it is enteric coated and could not be given via PEG-tube. Added fiber packet 9/6.  * On 9/8, noted that diarrhea improved.  * Budesonide suspension added 9/9.  -- Cont budesonide suspension BID; fiber packet daily.  -- Cont PRN loperamide 4 mg.  Overall improve now.      Anemia, suspect chronic component.  * Hgb 9.3 on admit. No overt clinical signs of major bleeding.  * Hgb stable at 8-9 this stay.  Recent Labs   Lab 09/21/22  0645   HGB 9.4*   -- Monitor CBC periodically as needed.     Thrombocytosis, suspect reactive.  * Plts  602K on admit 9/5.  Recent Labs   Lab 09/21/22  0645      -- Treat other issues as noted.  -- Monitor CBC.     Stage I sacral pressure ulceration, present on admission.    * On admit, noted nonblanching erythema present; no open sores appreciated. WOC RN consulted.  -- Cont local wound cares, frequent repositioning.     Weakness and physical deconditioning due to multiple acute and chronic medical issues.  * Recent prolonged hospitalization at [facility] (7/21-8/5/2022) as above. Was at TCU PTA.  -- Increase activity as able.  -- OOB to chair at least 3x/day.  -- Cont PT/OT, discharge plans as below.     Coronary artery disease.  * Known CAD with history of extensive proximal to distal RCA stenting; patent on last angiogram in May 2020.  -- Cont clopidogrel.     CVA history.  -- Cont apixaban, atorvastatin.     MGUS.  * February 2022 bone marrow biopsy with hypercellular marrow including 7% plasma cells. Followed w/ q6 month SPEP through oncology service.  -- Follow-up outpatient.     Clinically Significant Risk Factors Present on Admission                        COVID-19 testing.  COVID-19 PCR Results    COVID-19 PCR Results 1/10/22 1/18/22 7/21/22 7/29/22 9/5/22 9/13/22   SARS CoV2 PCR Negative Negative Negative Negative Negative Negative      Comments are available for some flowsheets but are not being displayed.         COVID-19 Antibody Results, Testing for Immunity    COVID-19 Antibody Results, Testing for Immunity   No data to display.             Diet: NPO for Medical/Clinical Reasons Except for: NPO but receiving Tube Feeding  Adult Formula Drip Feeding: Continuous TwoCal HN; Gastrostomy; Goal Rate: 30; mL/hr; Medication - Feeding Tube Flush Frequency: At least 15-30 mL water before and after medication administration and with tube clogging; Amount to Send (Nutrition us...  Diet    Prophylaxis: PCD's, ambulation. On apixaban.  Bravo Catheter: Not present  Central Lines: None  Code Status: Full  Code    Disposition Plan   Expected discharge: Recommended to discharge home with home care likely in 1-2 days if remain stable.   Remains medically stable to discharge as of 9/13. Had been planning for TCU stay but note concerns with cost (see SW note from 9/15). Patient subsequently elected to discharge home with son (son agreeable) and home care services. Hospital bed, bedside commode and home care services (home infusion for TFs and PT/OT, RN, HHA) ordered. Orders placed 9/16. CC following.           Entered: Jeronimo Caal MD 09/24/2022, 12:07 PM       Communication.  - I d/w pt's son 9/23.      Interval History    Patient seen and evaluated in his room today, feeling better, has some baseline SOB, no fever, chills, cough,  Chest pain, nausea, vomiting, headache or dizziness at this time.     -Data reviewed today: I reviewed all new labs and imaging over the last 24 hours. I personally reviewed no images or EKG's today.    Physical Exam    , Blood pressure 114/73, pulse 82, temperature 97.4  F (36.3  C), temperature source Oral, resp. rate 16, weight 47.2 kg (104 lb), SpO2 96 %. O2 Device: None (Room air)    Vitals:    09/22/22 0531 09/23/22 0434 09/24/22 0722   Weight: 47.4 kg (104 lb 8 oz) 51.3 kg (113 lb) 47.2 kg (104 lb)     Vital Signs with Ranges  Temp:  [97  F (36.1  C)-98.2  F (36.8  C)] 97.4  F (36.3  C)  Pulse:  [76-87] 82  Resp:  [16-18] 16  BP: ()/(63-73) 114/73  SpO2:  [94 %-96 %] 96 %  Patient Vitals for the past 24 hrs:   BP Temp Temp src Pulse Resp SpO2 Weight   09/24/22 0827 114/73 97.4  F (36.3  C) Oral 82 16 96 % --   09/24/22 0722 -- -- -- -- -- -- 47.2 kg (104 lb)   09/24/22 0057 98/63 97  F (36.1  C) Axillary 76 16 95 % --   09/23/22 1952 105/72 98  F (36.7  C) Oral 84 16 95 % --   09/23/22 1530 105/73 98.2  F (36.8  C) Axillary 87 18 94 % --     I/O's Last 24 hours  I/O last 3 completed shifts:  In: 1620 [NG/GT:1620]  Out: 800 [Urine:800]    Constitutional: Awake, alert, pleasant,  fatigued.  Respiratory: Diminished in bases. Bilateral crackles, scattered rhonchi.  Cardiovascular: RRR, +I/VI systolic m, no g/r.  GI: Soft, nt, nd, +BS.  Skin/Integumen: no rash   Other:        Data   Recent Labs   Lab 09/23/22  1848 09/23/22  0712 09/22/22  1454 09/22/22  0759 09/21/22  0645 09/19/22  1519 09/19/22  0735 09/18/22  0816   WBC  --   --   --   --  14.4*  --   --   --    HGB  --   --   --   --  9.4*  --   --   --    MCV  --   --   --   --  101*  --   --   --    PLT  --   --   --   --  399  --   --   --    NA  --   --   --   --   --   --  139  --    POTASSIUM 3.9 3.3* 3.7   < > 3.8   < > 2.9*  --    CHLORIDE  --   --   --   --   --   --  107  --    CO2  --   --   --   --   --   --  26  --    BUN  --   --   --   --   --   --  16  --    CR  --   --   --   --   --   --  0.43* 0.46*   ANIONGAP  --   --   --   --   --   --  6  --    ULISSES  --   --   --   --   --   --  8.1*  --    GLC  --   --   --   --   --   --  131*  --     < > = values in this interval not displayed.     Recent Labs   Lab Test 09/19/22  0735 09/15/22  0911 09/15/22  0706 09/14/22  0410 09/12/22  0637 05/20/20  0415 05/19/20  1254 05/19/20  0922 05/19/20  0850 05/19/20  0508 05/18/20 2025 05/18/20 2015 08/08/16  0648 08/04/16  0758   * 116* 135* 127* 132*   < >  --   --    < >  --    < >  --    < >  --    BGM  --   --   --   --   --   --  99 108*  --  111*  --  101*  --  107*    < > = values in this interval not displayed.     Recent Labs   Lab 09/21/22  2227 09/21/22  0645 09/20/22  1619 09/20/22  1516   WBC  --  14.4*  --   --    LACT 1.2  --  1.1  --    PCAL  --   --   --  <0.05         No results found for this or any previous visit (from the past 24 hour(s)).    Medications   All medications were reviewed.    dextrose       Reason anticoagulant not prescribed for atrial fibrillation       - MEDICATION INSTRUCTIONS -         acetaminophen  975 mg Per G Tube TID     apixaban ANTICOAGULANT  5 mg Per G Tube BID     atorvastatin   40 mg Per G Tube At Bedtime     banatrol plus  1 packet Per Feeding Tube TID     budesonide  4.5 mg Per G Tube BID     calcium carbonate 600 mg-vitamin D 400 units  1 tablet Per G Tube BID     clopidogrel  75 mg Per G Tube Daily     digoxin  125 mcg Per G Tube Daily     ferrous sulfate  220 mg Per G Tube Daily     fiber modular (NUTRISOURCE FIBER)  1 packet Per Feeding Tube Daily     gabapentin  300 mg Per G Tube QAM     gabapentin  600 mg Per G Tube At Bedtime     mirtazapine  15 mg Per G Tube At Bedtime     multivitamin  with lutein  1 capsule Per G Tube Daily     sodium chloride (PF)  3 mL Intracatheter Q8H     dextrose, HYDROmorphone, ipratropium - albuterol 0.5 mg/2.5 mg/3 mL, lidocaine 4%, lidocaine (buffered or not buffered), loperamide, melatonin, metoprolol, naloxone **OR** naloxone **OR** naloxone **OR** naloxone, Reason anticoagulant not prescribed for atrial fibrillation, ondansetron **OR** ondansetron, oxyCODONE, - MEDICATION INSTRUCTIONS -, prochlorperazine **OR** prochlorperazine **OR** prochlorperazine, sodium chloride (PF)

## 2022-09-24 NOTE — PROGRESS NOTES
"Hospitalist phoenix messaged with this message\"room 805 JM, pt has home care RN coming in the afternoon. so pt can d/c now. please put order for discharge- per Charge RN, matt nick \". Pending call back. Will continue to monitor patient. Matt Heaton RN on 9/24/2022 at 10:16 AM    1224- RN demonstrated pt's son on how to give medication from G-tube, and showed him how to re-fill feeding pump bag. He verbalized the understanding. Matt Heaton RN on 9/24/2022 at 12:27 PM    1238 Pt is ready for discharge . Discharge education and follow-ups appointments explained to the patient. All patient belongings and medications are sent with the patient. Pt is leaving with the family (son). Matt Heaton RN on 9/24/2022 at 12:39 PM          "

## 2022-09-24 NOTE — PLAN OF CARE
Goal Outcome Evaluation:      Patient remains stable with vitals in the normal range. Alert and oriented. Denies pain. Continues on continuous tube feeding at 30 ml/hr. Patient is hooked up on a home infusion pump. Reportedly, patient's son needs a teaching on how to manage tube feed and medication administration. Will need to contact home infusion services before discharge today. Voiding in the urinal with frequency. No bowel movement. Turn and reposition.

## 2022-09-26 NOTE — PROGRESS NOTES
"Clinic Care Coordination Contact  [facility]: Post-Discharge Note  SITUATION                                                      Admission:    Admission Date: 09/05/22   Reason for Admission: SOB  Discharge:   Discharge Date: 09/24/22  Discharge Diagnosis: Pneumonia, suspect aspiration    BACKGROUND                                                      Per hospital discharge summary and inpatient provider notes:Pneumonia, suspect aspiration.  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  * Pt with h/o dysphagia and chronic aspiration s/p PEG-tube 7/28.  * Initial presentation as above. Given antibiotics in ED; not continued after admit given suspected chronic findings on presentation. BC's 9/5 NG.  * CT chest 9/7 showed new patchy consolidative opacities in both lungs, right greater than left, noted this most likely represented pneumonia superimposed on the patient's background fibrosis.  * Started on ceftriaxone and metronidazole 9/8.  * Sputum 9/10 orally contaminated.  * Ceftriaxone changed fo cefuroxime 9/12.  * Completed course of treatment with cefuroxime and metronidazole on 9/17.   * On 9/20, pt's son noted pt coughing more; repeat CXR ordered at son's request 9/20 showed slight worsening in the right lung superimposed on diffuse interstitial pulmonary fibrosis. Procalcitonin 9/20 <0.05.  * Started course of cefdinir 9/21.  -- O2 sats stable on RA.  -- Continue cefdinir for 7d at discharge.  -- Cont PRN ipratropium-albuterol nebs.      ASSESSMENT           Discharge Assessment  How are you doing now that you are home?: \" seem to be doing ok \"  How are your symptoms? (Red Flag symptoms escalate to triage hotline per guidelines): Improved  Do you feel your condition is stable enough to be safe at home until your provider visit?: Yes  Does the patient have their discharge instructions? : Yes  Does the patient have questions regarding their " discharge instructions? : No  Were you started on any new medications or were there changes to any of your previous medications? : Yes  Does the patient have all of their medications?: Yes  Do you have questions regarding any of your medications? : No  Do you have all of your needed medical supplies or equipment (DME)?  (i.e. oxygen tank, CPAP, cane, etc.): Yes  Discharge follow-up appointment scheduled within 14 calendar days? : Yes  Discharge Follow Up Appointment Date: 10/04/22  Discharge Follow Up Appointment Scheduled with?: Primary Care Provider    Post-op ([facility] CTA Only)  If the patient had a surgery or procedure, do they have any questions for a nurse?: No             PLAN                                                      Outpatient Plan:   Follow-up with primary care provider, Danny Paige MD, within 7 days for hospital follow-up.  The following labs/tests are recommended: CBC, BMP          Future Appointments   Date Time Provider Department Center   10/4/2022  9:00 AM Danny Paige MD [facility]   10/5/2022 10:00 AM Amparo Antunez, PharmD [facility]         For any urgent concerns, please contact our 24 hour nurse triage line: [phone] ([phone])         Kamilah Thompson

## 2022-09-26 NOTE — PLAN OF CARE
Physical Therapy Discharge Summary    Reason for therapy discharge:    Discharged to home with home therapy.    Progress towards therapy goal(s). See goals on Care Plan in [facility] electronic health record for goal details.  Goals partially met.  Barriers to achieving goals:   discharge from facility.    Therapy recommendation(s):    Continued therapy is recommended.  Rationale/Recommendations:  PT recommended TCU at discharge. Patient and son choosing to discharge home with  PT for improvement in strength, functional mobility, endurance, and independence.

## 2022-09-26 NOTE — TELEPHONE ENCOUNTER
Ginette calling from [facility] ([phone], secure line).     Ginette requesting verbal orders for:     Skill nursing twice a week x1 week, then once a week x8 weeks and physical therapy and occupational therapy evaluation     Ginette GUADARRAMA will be filing a volnerable adult report for severe haording.       Last VV with Dr. Paige was on 3/25/2022.      Corrina Machado RN BSN MSN  [facility]

## 2022-09-26 NOTE — TELEPHONE ENCOUNTER
I approve of requested home care orders.    Noted with regards to the vulnerable adult    Danny Paige MD, MD

## 2022-09-27 NOTE — TELEPHONE ENCOUNTER
Call to Ginette with [facility] at [phone].     Detailed message left with Dr. Paige's below response.       Corrina Machado, RN BSN MSN  [facility]

## 2022-09-27 NOTE — TELEPHONE ENCOUNTER
Patient was admitted to [facility] on 9/5/22 for acute on chronic HR exacerbation likely secondary to valvular disease. PNA-IV ABX's this admission.    PMH: CVA, CAD with extensive RCA stenting, atrial fibrillation/flutter on Digoxin after Amiodarone lung toxicity, MGUS with ongoing progression being monitored by oncology, failure to thrive with chronic aspiration and recent G-tube placement for tube feeds, and severe LFLG aortic stenosis as well as severe tricuspid regurgitation.    Echo showed EF of 60-65%. Flattened septum is consistent with RV pressure/volume overload. Right ventricle is moderate to severely dilated. The right ventricular systolic function is normal. Severe aortic stenosis (low flow low gradient), Vmax 3.3 m/s, mean gradient 25 mmHg, SEBASTIAN 0.69cm2, DI 0.2. SVi 22 cc/m2. There is mild (1+) aortic regurgitation. Mild to moderate (1-2+) mitral regurgitation. Severe (4+) tricuspid regurgitation. Pulmonary hypertension present.    TAVR consult: Discussed with structural heart team-given his co-morbidities, frailty, and rapid decline in functional status he remains high risk candidate for TAVR and deemed not an appropriate candidate. Recommend ongoing conservative management with medical therapy and optimization of guideline directed medical therapy for heart failure.     Cardiology recommended palliative and hospice consult due to his progressive end-stage aortic stenosis. Patient refused hospice consult and wanted to proceed with aggressive measures.    No cardiac medication changes made at time of discharge.    Called patient and spoke to his son to discuss any post hospital d/c questions and confirm f/u appts.    Patient has denied any chest pain, edema, or light headedness. Some HIGH with climbing stairs and ambulating longer distances.    RN confirmed with son that patient should be scheduled for a follow up cardiologist appt with Dr. Xiong in 1-2 month per SHAWN Garcia Dewey's recommendation-order placed  and scheduling phone number provided. Pt was discharged to home in care of family and with [facility] services.    Advised to call clinic with any cardiac related questions or concerns prior to his appt, and son verbalized understanding and agreed with plan. Dr. Xiong's Team RN phone number provided. JB Ruff RN.

## 2022-09-27 NOTE — PROGRESS NOTES
This is a recent snapshot of the patient's [facility] medical record.  For current drug dose and complete information and questions, call [phone] or In Basket pool, fv home infusion ([id number])  CSN Number:  [id number]

## 2022-09-30 NOTE — TELEPHONE ENCOUNTER
Telephone Call for medication questions for patient's cardiologist:     Pt's son calling to report that he has some medication questions for patient's cardiologist. Consent to communicate is on file.     He is asking if he should be eventually tapering patient off plavix and also if patient should continue on Budesonide.     Pt has a hospital follow up appointment with his PCP on 10/4/2022 as well.     Writer transferred caller to the switchboard to reach the [facility] in [place] to discuss this with patient's cardiology team per his request.     Cee Monet RN  [facility] Nurse Advisor 2:14 PM 9/30/2022      Reason for Disposition    Caller has NON-URGENT medicine question about med that PCP or specialist prescribed and triager unable to answer question    Additional Information    Negative: Drug overdose and triager unable to answer question    Negative: Caller requesting a renewal or refill of a medicine patient is currently taking    Negative: Caller requesting information unrelated to medicine    Negative: Caller requesting information about COVID-19 Vaccine    Negative: Caller requesting information about Emergency Contraception    Negative: Caller requesting information about Combined Birth Control Pills    Negative: Caller requesting information about Progestin Birth Control Pills    Negative: Caller requesting information about Post-Op pain or medicines    Negative: Caller requesting a prescription antibiotic (such as penicillin) for Strep throat and has a positive culture result    Negative: Caller requesting a prescription anti-viral med (such as Tamiflu) and has influenza (flu) symptoms    Negative: Immunization reaction suspected    Negative: Rash while taking a medicine or within 3 days of stopping it    Negative: Asthma and having symptoms of asthma (cough, wheezing, etc.)    Negative: Symptom of illness (e.g., headache, abdominal pain, earache, vomiting) that are more than mild    Negative:  Breastfeeding questions about mother's medicines and diet    Negative: MORE THAN A DOUBLE DOSE of a prescription or over-the-counter (OTC) drug    Negative: DOUBLE DOSE (an extra dose or lesser amount) of prescription drug and any symptoms (e.g., dizziness, nausea, pain, sleepiness)    Negative: DOUBLE DOSE (an extra dose or lesser amount) of over-the-counter (OTC) drug and any symptoms (e.g., dizziness, nausea, pain, sleepiness)    Negative: Took another person's prescription drug    Negative: DOUBLE DOSE (an extra dose or lesser amount) of prescription drug and NO symptoms  (Exception: A double dose of antibiotics.)    Negative: Diabetes drug error or overdose (e.g., took wrong type of insulin or took extra dose)    Negative: Caller has medication question about med NOT prescribed by PCP and triager unable to answer question (e.g., compatibility with other med, storage)    Negative: Prescription not at pharmacy and was prescribed by PCP recently  (Exception: triager has access to EMR and prescription is recorded there. Go to Home Care and confirm for pharmacy.)    Negative: Pharmacy calling with prescription question and triager unable to answer question    Negative: Caller has URGENT medicine question about med that PCP or specialist prescribed and triager unable to answer question    Protocols used: MEDICATION QUESTION CALL-A-OH

## 2022-09-30 NOTE — PROGRESS NOTES
This is a recent snapshot of the patient's [facility] medical record.  For current drug dose and complete information and questions, call [phone] or In Basket pool, fv home infusion ([id number])  CSN Number:  [id number]

## 2022-10-03 NOTE — PROGRESS NOTES
This is a recent snapshot of the patient's [facility] medical record.  For current drug dose and complete information and questions, call [phone] or In Basket pool, fv home infusion ([id number])  CSN Number:  [id number]

## 2022-10-03 NOTE — TELEPHONE ENCOUNTER
Message received from patient's son regarding need for Plavix refill. Did review Plavix need with Kaley Robles NP who saw patient while he was recently inpatient who verbalized patient does not need to continue taking Plavix any longer but should start Aspirin 81mg daily.      Patient's son did state that patient has been taking [place] which he believes has aspirin in it and they are seeing patient's PCP tomorrow to review.

## 2022-10-04 PROBLEM — J84.112: Status: ACTIVE | Noted: 2022-01-01

## 2022-10-04 PROBLEM — Z93.1 S/P PERCUTANEOUS ENDOSCOPIC GASTROSTOMY (PEG) TUBE PLACEMENT (H): Status: ACTIVE | Noted: 2022-01-01

## 2022-10-04 NOTE — PROGRESS NOTES
"Alfredo is a 77 year old who is being evaluated via a billable telephone visit.      What phone number would you like to be contacted at? [phone]  How would you like to obtain your AVS? Malinda Skelton   Alfredo is a 77 year old, presenting for the following health issues:  Hospital F/U      HPI     Post Discharge Outreach 9/26/2022   Admission Date 9/5/2022   Reason for Admission SOB   Discharge Date 9/24/2022   Discharge Diagnosis Pneumonia, suspect aspiration   How are you doing now that you are home? \" seem to be doing ok \"   How are your symptoms? (Red Flag symptoms escalate to triage hotline per guidelines) Improved   Do you feel your condition is stable enough to be safe at home until your provider visit? Yes   Does the patient have their discharge instructions?  Yes   Does the patient have questions regarding their discharge instructions?  No   Were you started on any new medications or were there changes to any of your previous medications?  Yes   Does the patient have all of their medications? Yes   Do you have questions regarding any of your medications?  No   Do you have all of your needed medical supplies or equipment (DME)?  (i.e. oxygen tank, CPAP, cane, etc.) Yes   Discharge follow-up appointment scheduled within 14 calendar days?  Yes   Discharge Follow Up Appointment Date 10/4/2022   Discharge Follow Up Appointment Scheduled with? Primary Care Provider     Hospital Follow-up Visit:    Hospital: [facility]  Date of Admission: 9/5/22  Date of Discharge: 9/24/22  Reason(s) for Admission:   1. Pneumonia, suspect aspiration.  2. 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  3. Acute on chronic diastolic CHF exacerbation (valvular).  4. Severe AS.  5. Severe TR.   6. Afib with RVR contributing or related to above.  7. Dysphagia with suspected chronic aspiration, s/p PEG-tube placement (7/28/2022).  8. " Persistent diarrhea, suspect related to TF's, question related to collagenous colitis, improved.  9. Anemia, suspect chronic component.  10. Thrombocytosis, suspect reactive.  11. Stage I sacral pressure ulceration, present on admission.    12. Severe malnutrition related to acute and chronic medical issues.  13. Weakness and physical deconditioning due to multiple acute and chronic medical issues    Was your hospitalization related to COVID-19? No   Problems taking medications regularly:  None  Medication changes since discharge: None  Problems adhering to non-medication therapy:      Summary of hospitalization:  [facility] discharge summary reviewed  Diagnostic Tests/Treatments reviewed.  Follow up needed: cardiology and pulmonology   Other Healthcare Providers Involved in Patient s Care:         Homecare  Update since discharge: fluctuating course.   Post Medication Reconciliation Status:        Plan of care communicated with patient and son     Pneumonia, suspect aspiration.  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   Efrem Ramos had recurrence of pneumonia.  He had known dysphagia and chronic aspiration s/p PEG-tube 7/28.  He completed course of antibiotics.  He currnetly is using hospital bed with head of bed raised to 30 degrees.  His son is able to help him get to and from the bathroom about 2 times per day.  He uses urinal in bed.  He did have some physical therapy in the hospital.  Physical therapy scheduled for Thursday.  O2 sats stable on RA.  Last antibiotics were cefdinir for 7d at discharge.  He has continued on as needed ipratropium-albuterol nebs.     Acute on chronic CHF exacerbation (valvular).  Afib with RVR contributing or related to above.  Severe AS.  Severe TR.    He has continued on prior to admission apixaban 5 mg BID and digoxin 125 mcg daily.  He has known chronic hypotension, limiting cardiac meds.  He  did echocardiogram 9/6 showed left ventricular ejection fraction was 60-65%; right ventricle moderate to severely dilated with normal right ventricular systolic function; signs of RV pressure/volume overload; severe aortic stenosis; severe tricuspid regurgitation mild-moderate MR; pulmonary hypertension present; trivial pericardial effusion. They also recommended palliative and hospice consult due to his progressive end-stage aortic stenosis.  Patient refused hospice consult and wanted to proceed with aggressive measures.  He would like to continue with this philosophy and willing to go in to the hospital.     Dysphagia with suspected chronic aspiration, s/p PEG-tube placement (7/28/2022).  Severe malnutrition related to acute and chronic medical issues.   As above, he had a percutaneous G-tube placed via surgical team 7/28 for chronic aspiration and failure to thrive.  All nutrition through G-tube and fluids through the g-tube.  No longer receiving potassium supplements.  Is due for lab recheck.     Persistent diarrhea, suspect related to TF's, question related to collagenous colitis, improved.  H/o collagenous colitis.   On admit,he expressed an interest in colostomy so that he might avoid ongoing episodes of diarrhea and need for pericares; it was discussed with patient that a colostomy in this instance would be for quality of life, and an elective procedure and with his valvular heart failure and tenuous respiratory status with chronic aspiration, would be a poor surgical candidate for this, and focus should be on attempting to adjust to pain formula to treat likely diarrhea associated with tube feeds versus determine underlying etiology of diarrhea and provide treatment.  On admit, noted that in 2005, patient had a cecal biopsy with pathology consistent with collagenous colitis.   He was seen by GI 9/6 who recommended adding budesonide, however it is enteric coated and could not be given via PEG-tube.  Added  fiber packet. Diarrhea improved. Budesonide suspension added 9/9 and continued twice per day indefinitely and as needed loperamide 4 mg.     Anemia, suspect chronic component.   This remained stable.  Needs follow up complete blood counts   .      Weakness and physical deconditioning due to multiple acute and chronic medical issues.   He was at transitional care unit prior to admission and had been planning for transitional care unit stay but noted concerns with cost (see SW note from 9/15).  Patient subsequently elected to discharge home with son (son agreeable) and home care services.  Hospital bed, bedside commode and home care services (home infusion for TFs and PT/OT, RN, HHA) ordered 9/16.  He is aware as is son that conditioning may improve, but there is no certainty that this will be case and that he may decline further despite best efforts and intentions.  He does have physical therapy coming later this week.      Coronary artery disease.   He is known to hav coronary artery disease with history of extensive proximal to distal RCA stenting; patent on last angiogram in May 2020.  He did have a conversation with cardiology and recommended to discontinue plavix and start aspirin 81 mg daily.  He is getting aspirin through [facility]      CVA history.   He also has been recommended to continue apixaban, atorvastatin.     MGUS.   February 2022 bone marrow biopsy with hypercellular marrow including 7% plasma cells. Followed w/ q6 month SERUM PROTEIN ELECTROPHORESIS  through oncology service.      Review of Systems   Constitutional, HEENT, cardiovascular, pulmonary, GI, , musculoskeletal, neuro, skin, endocrine and psych systems are negative, except as otherwise noted.      Objective           Vitals:  No vitals were obtained today due to virtual visit.    Physical Exam   healthy, alert and no distress  PSYCH: Alert and oriented times 3; coherent speech, normal   rate and volume, able to articulate logical  thoughts, able   to abstract reason, no tangential thoughts, no hallucinations   or delusions  His affect is normal  RESP: No cough, no audible wheezing, able to talk in full sentences  Remainder of exam unable to be completed due to telephone visits      (J15.9) Community acquired bacterial pneumonia  (primary encounter diagnosis)  Comment: We will recheck labs if home nursing able to draw blood.  We discussed that there is a realistic possibility that he will have recurrence and we agreed to have an empiric course of doxycycline available should symptoms present and he is unable to leave the home for transport to clinic or hospital.  I recommended that we have a video visit next Friday to follow up on his end-of-life care and consider palliative care consult  Plan: CBC with platelets, Basic metabolic panel  (Ca,        Cl, CO2, Creat, Gluc, K, Na, BUN), doxycycline         hyclate (VIBRA-TABS) 100 MG tablet            (K52.839) Microscopic colitis, unspecified microscopic colitis type  Comment: Refilled budesonide.  Son wondering if pills could be offered, but due to G-tube need to be liquid  Plan: budesonide (ENTOCORT) 0.6 mg/mL SUSP            (J69.0) Aspiration pneumonia of right lower lobe, unspecified aspiration pneumonia type (H)  Comment: as above - OK to have doxycycline available  Plan:     (J84.112) Chronic idiopathic pulmonary fibrosis (H)  Comment: Noted that there is expectation that breathing will deteriorate and he may need oxygen by nasal canula   Plan:     (I35.0) Severe aortic stenosis  Comment: as above - congestive heart failure may present if stenosis worsens and he is not candidate for TAVR per cardiology   Plan:     (I48.21) Permanent atrial fibrillation (H)  Comment: Continues on digoxin and apixaban   Plan:     (Z93.1) S/P percutaneous endoscopic gastrostomy (PEG) tube placement (H)  Comment: this has been helpful for medication management   Plan:     (Z71.1) Concern about end of  life  Comment: discussed that there are realistic concerns for end of life and that important conversations need to be held to help his care team provide the best care for him  Plan:            Phone call duration: 60 minutes

## 2022-10-05 NOTE — PROGRESS NOTES
Medication Therapy Management (MTM) Encounter    ASSESSMENT:                            Medication Adherence/Access: Stable unless otherwise noted below.    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  Given patient's diagnosis of HF and that AlkaSeltzer contains quite a bit of sodium and high doses of aspirin, may benefit from use of guaifenesin instead for mucous.  If he's able to find success in this, then will need to start aspirin 81mg daily.    Acute on chronic CHF exacerbation (valvular)/Afib with RVR/Severe aortic stenosis and severe TR/CAD: Stable, as above re: sodium in AlkaSeltzer.    Persistent diarrhea, suspect related to TF's, question related to collagenous colitis: Improved.    Hyperlipidemia: Stable.  Patient is on high intensity statin which is indicated based on 2019 ACC/AHA guidelines for lipid management.       Chronic Pain:  650mg acetaminophen tablets are extended release and should not be crushed.  May benefit from changing to 500mg tablets instead.  May benefit from use of diclofenac gel or Lidocaine patches if pain worsens.  They request Rx for oxycodone to have on hand, will defer to primary care physician.    Depression/Insomnia: Stable.     Supplements:  Due to re-check potassium levels with next labs.    PLAN:                            1.  Suggested trial of guaifenesin liquid instead of Kathy [place] for mucous in throat.  If AlkaSeltzer can be stopped - will need to start aspirin 81mg daily.  2.  Recommended changing from acetaminophen 650mg tablets to 500mg tablets - 500mg tablets can be crushed.  3.  Recommended using topical therapy for pain control when needed.  Refill request routed to primary care physician for oxycodone.  4.  Future order placed for potassium to be re-checked with next set of labs.    Follow-up: Return in about 4 weeks (around 11/2/2022) for check in via  [facility].    SUBJECTIVE/OBJECTIVE:                          Efrem Ramos is a 77 year old male called for a transitions of care visit. He was discharged from [facility] on 2022 (admitted 22) for the followin. Pneumonia, suspect aspiration.  2. 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  3. Acute on chronic diastolic CHF exacerbation (valvular).  4. Severe AS.  5. Severe TR.   6. Afib with RVR contributing or related to above.  7. Dysphagia with suspected chronic aspiration, s/p PEG-tube placement (2022).  8. Persistent diarrhea, suspect related to TF's, question related to collagenous colitis, improved.  9. Anemia, suspect chronic component.  10. Thrombocytosis, suspect reactive.  11. Stage I sacral pressure ulceration, present on admission.    12. Severe malnutrition related to acute and chronic medical issues.  13. Weakness and physical deconditioning due to multiple acute and chronic medical issues. Patient was accompanied by his son, also named Alfredo, for our visit today.     Reason for visit: Comprehensive medication review.    Allergies/ADRs: Reviewed in chart  Past Medical History: Reviewed in chart  Tobacco: He reports that he quit smoking about 9 years ago. His smoking use included cigarettes. He has a 30.00 pack-year smoking history. He has never used smokeless tobacco.  Alcohol: none    Medication Adherence/Access: Patient has PEG tube and administers medications via this route - tablets are crushed, mixed with water, and administered with syringe via PEG tube.  Capsules are opened and administered similarly.  Patient's son is assisting with medication management, no issues reported.    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  Patient has completed antibiotic  therapy, but does now have an Rx for doxycycline to start if symptoms start again.  No current symptoms of pneumonia reported.  Patient/son report that tube feeds have been incredibly helpful, but he has been experiencing increased throat mucous.  He's started using Kathy [place] 2 tabs twice daily as they've read on online forums that this can help.  They feel it's been effective.     Acute on chronic CHF exacerbation (valvular)/Afib with RVR/Severe aortic stenosis and severe TR:  Current medication regimen includes apixaban 5 mg twice daily and digoxin 125 mcg daily.  He was previously on clopidogrel, this has since been discontinued and changed to aspirin 81mg daily.  Since patient is using AlkaSeltzer regularly, he's not taking additional aspirin (he's currently getting 1300mg of aspirin per day through [employer]).  Chronic hypotension has limited use of additional cardiac medications.  Echo 9/6 showed LVEF 60-65%  Cardiology evaluated patient during hospitalization and recommended medical management for his severe aortic stenosis - is not a candidate for TAVR due to frailty.  They also recommended palliative and hospice consult due to his progressive end-stage aortic stenosis, patient refused.  Patient does have follow-up scheduled with Dr. Paige to discuss end of life goals.      Persistent diarrhea, suspect related to TF's, question related to collagenous colitis:  Patient was having frequent diarrhea since PEG-tube placement.  Patient's son reports patient was incontinent several times daily.  Patient does have history of collagenous colitis.  Tube feed rate was adjusted during hospitalization, which seemed to help symptoms.  He was also started on budesonide liquid 4.5mg twice daily, banana flakes twice daily, and a fiber packet daily.   He also has senna available for use, has not been using.  They report that Alfredo is now having more sensation to have a bowel movement and generally has been able to get  to the bathroom.     Hyperlipidemia: Current therapy includes atorvastatin 40mg daily.  Patient reports no significant myalgias or other side effects.  The ASCVD Risk score (Bjelicia ROBBINS Jr., et al., 2013) failed to calculate for the following reasons:    The patient has a prior MI or stroke diagnosis  Recent Labs   Lab Test 10/26/21  1627 06/24/19  1023 11/09/16  1246 09/04/15  1040 05/29/15  1203   CHOL 92 118   < > 162 189   HDL 45 51   < > 59 80   LDL 37 51   < > 83 93   TRIG 52 80   < > 100 80   CHOLHDLRATIO  --   --   --  2.7 2.4    < > = values in this interval not displayed.     Chronic Pain:  Patient has chronic pain - currently taking acetaminophen 1300mg twice daily with a 3rd dose if needed, diclofenac gel as needed (no use since discharge), gabapentin 300mg every morning and 600mg every evening, Lidocaine patches (no use since discharge) and oxycodone 2.5mg four times daily as needed (they do not currently have this but would like to have on hand if needed).  They reports pain has been manageable recently.  No side effects reported.     Depression/Insomnia:  Current medications include: mirtazapine 15mg at bedtime and melatonin 5mg at bedtime.  They report symptoms are stable, no side effects reported.  He denies suicidal ideation.  PHQ-9 SCORE 4/2/2020 10/26/2021 3/25/2022   PHQ-9 Total Score MyChart - 20 (Severe depression) -   PHQ-9 Total Score 3 20 1      Supplements:  Current supplements include calcium/vitamin D twice daily and KCl 20mEq daily (they had not been doing this, just resumed yesterday after speaking with primary care physician).  They report he's been diagnosed with macular degeneration - would like to take Ocuvite but hasn't been able to administer gel caps.  Potassium   Date Value Ref Range Status   09/23/2022 3.9 3.4 - 5.3 mmol/L Final   06/22/2021 4.0 3.4 - 5.3 mmol/L Final      Today's Vitals: There were no vitals taken for this visit.  ----------------  Post Discharge Medication  Reconciliation Status: discharge medications reconciled and changed, per note/orders.    I spent 56 minutes with this patient today. All changes were made via collaborative practice agreement with Dr. Paige. A copy of the visit note was provided to the patient's provider(s).    The patient was sent via "[employer] a summary of these recommendations.     Amparo Antunez, PharmD, BCACP  Medication Therapy Management Provider  Pager: [phone]     Telemedicine Visit Details  Type of service:  Telephone visit  Start Time: 10:00 AM  End Time: 10:56 AM  Originating Location (patient location): [place]  Distant Location (provider location):  [facility]     Medication Therapy Recommendations  Pain    Current Medication: acetaminophen (TYLENOL) 500 MG tablet   Rationale: Synergistic therapy - Needs additional medication therapy - Indication   Recommendation: Start Medication - diclofenac 1 % topical gel   Status: Patient Agreed - Adherence/Education          Rationale: Incorrect administration - Adverse medication event - Safety   Recommendation: Change Medication Formulation  - acetaminophen 500 MG tablet   Status: Accepted - no CPA Needed         S/P percutaneous endoscopic gastrostomy (PEG) tube placement (H)    Current Medication: Aspirin Effervescent (WENDY-SELTZER ORIGINAL PO)   Rationale: Unsafe medication for the patient - Adverse medication event - Safety   Recommendation: Change Medication - guaiFENesin 100 MG/5ML liquid   Status: Accepted - no CPA Needed         Takes dietary supplements    Current Medication: potassium chloride ER (K-TAB/KLOR-CON) 10 MEQ CR tablet   Rationale: Medication requires monitoring - Needs additional monitoring - Effectiveness   Recommendation: Order Lab   Status: Accepted per CPA

## 2022-10-05 NOTE — TELEPHONE ENCOUNTER
I recommend sending this prescription to the [place] pharmacy as he was prescribed this while in the hospital without difficulty

## 2022-10-05 NOTE — TELEPHONE ENCOUNTER
Outpatient Medication Detail     Disp Refills Start End ARMANI   budesonide (ENTOCORT) 0.6 mg/mL SUSP 450 mL 3 10/4/2022  No   Sig - Route: 7.5 mLs (4.5 mg) by Per G Tube route 2 times daily - Per G Tube   Sent to pharmacy as: budesonide 0.6 mg/mL PO SUSP   Class: E-Prescribe   Order: [id number]   E-Prescribing Status: Receipt confirmed by pharmacy (10/4/2022  9:05 AM CDT)     Pharmacy    [id number] - [address] AT [facility]     Associated Diagnoses    Microscopic colitis, unspecified microscopic colitis type [K52.839]             Fax from [employer] - Rx as sent 0.6mg/mL is NOT produced commercially.  Either change dose or send as is to compound pharmacy.    Enedelia Cronin, RT (R)

## 2022-10-06 NOTE — TELEPHONE ENCOUNTER
The Home Care/Assisted Living/Nursing Facility is calling regarding an established patient.  Has the patient seen Home Care in the past or is currently residing in Assisted Living or Nursing Facility? No.     Natasha calling from [facility] requesting the following orders that are NOT within the Home Care, Assisted Living or Nursing Home Eval and Treatment standing order and must be ordered by a Licensed Practitioner.    Preferred Call Back Number: [phone]    PT/OT/Speech Therapy    Routing to Licensed Practitioner (Provider) to review request and provide approval or recommendation.    Writer has verified Requestor will send fax to have orders signed.    Juju Britton RN  -[facility]

## 2022-10-07 NOTE — TELEPHONE ENCOUNTER
I would not recommend having oxycodone available unless there is a compelling need    Danny Paige MD, MD

## 2022-10-07 NOTE — TELEPHONE ENCOUNTER
FYI - Status Update    Who is Calling: RISA THE OCCUPATIONAL THERAPIST WITH [facility]     Update: Pt was seen for an evaluation on 10/07/22 and declined further home care OT right now because he is doing PT and is too fatigued     Does caller want a call/response back: No    
pacu/floor

## 2022-10-07 NOTE — PATIENT INSTRUCTIONS
"Recommendations from today's MTM visit:                                                    MTM (medication therapy management) is a service provided by a clinical pharmacist designed to help you get the most of out of your medicines.   Today we reviewed what your medicines are for, how to know if they are working, that your medicines are safe and how to make your medicine regimen as easy as possible.      1.  Consider trial of guaifenesin liquid instead of Kathy Marydel for mucous in throat.  If AlkaSeltzer can be stopped - will need to start aspirin 81mg daily.    2.  Recommended changing from acetaminophen 650mg tablets to 500mg tablets - 500mg tablets can be crushed.    3.  Recommended using topical therapy for pain control when needed.  Refill request routed to primary care physician for oxycodone.    4.  Future order placed for potassium to be re-checked with next set of labs.    Follow-up: Return in about 4 weeks (around 11/2/2022) for check in via [email or web address].    It was great speaking with you today.  I value your experience and would be very thankful for your time in providing feedback in our clinic survey. In the next few days, you may receive an email or text message from [employer] with a link to a survey related to your  clinical pharmacist.\"     To schedule another MTM appointment, please call the clinic directly or you may call the MTM scheduling line at [phone] or toll-free at [phone].     My Clinical Pharmacist's contact information:                                                      Please feel free to contact me with any questions or concerns you have.      Amparo Antunez, [place], [facility]  Medication Therapy Management Provider  Pager: [phone]    "

## 2022-10-07 NOTE — TELEPHONE ENCOUNTER
Patient/son request refill of oxycodone - is not currently needing to use but would like to have on hand if needed.  Routing to primary care physician.

## 2022-10-10 NOTE — PROGRESS NOTES
Amparo and Dr. Paige - I saw you just met with Alfredo and his son last week and he was noted to be restarting KCl.  I got this [employer] message:    Nik Luong. My Dad is on a feeding tube. So currently all his medications need to go through the tube. His potassium prescription is for ER tablets. Unfortunately they can't be crushed for use in the tube and won't dissolve in water. I have heard there is a powder alternative he can safely use. Could send a prescription for this substitute to his [facility] pharmacy please ([facility] [place]). Thank you.     Is this something you could pls Rx? It looks like a lot has happened since I saw him 1 year ago, and since we're not following labs, etc, I don't think I'm the best one to order it/follow it.    Marley Pino

## 2022-10-10 NOTE — TELEPHONE ENCOUNTER
Fax from [facility] received with note:  Patient requesting Budesonide capsules be sent to [facility]'s.  Contact patient if any questions, need information.    See previous information last week - Rx sent on 10/5/22 to compound pharmacy for suspension version as [facility] could not obtain.    Needs triage call - more information needed.  Why change to capsules? Suspension was Rx'd 10/5 should have it now from [facility].    C 11- with son Efrem if needed    RT Tatyana (R)

## 2022-10-10 NOTE — TELEPHONE ENCOUNTER
3 requests in mc  1. Budesonide 3mg capsule , disolved through gtube bid- Pended - was liquid before  2. Potassium 20meq packets daily- asking for packets for tube - Pended was HISTORICAL before - check frequency  3. Trapeze dme  - unsure how to pend without pending full bed?

## 2022-10-10 NOTE — TELEPHONE ENCOUNTER
Pt verified he needs order for Budesonide capsules to [facility] pharmacy. [facility] pharmacy doesn't make compound meds and were compound med is made, is not covered by his insurance. Pt is able to get capsules at 's pharmacy- open capsules and grind them. He has talked to 's pharmacist    Please advice on pharmacy and change in medication form.

## 2022-10-11 NOTE — PATIENT INSTRUCTIONS
"1. Reviewed angiogram 9/5 showing no significant changes compared with last time.  No \"plumbing\" reason for continued breathing issues    2. Reviewed other labs drawn showing hemoglobin is still slightly low but stable, SLIGHTLY elevated white count (but no fever), normal liver, normal kidneys and normal electrolytes     3. As we discussed, will see what Dr. Tineo says and determine follow up    4. My nurse is Opal: [phone]  " Star Wedge Flap Text: The defect edges were debeveled with a #15 scalpel blade.  Given the location of the defect, shape of the defect and the proximity to free margins a star wedge flap was deemed most appropriate.  Using a sterile surgical marker, an appropriate rotation flap was drawn incorporating the defect and placing the expected incisions within the relaxed skin tension lines where possible. The area thus outlined was incised deep to adipose tissue with a #15 scalpel blade.  The skin margins were undermined to an appropriate distance in all directions utilizing iris scissors.

## 2022-10-12 NOTE — TELEPHONE ENCOUNTER
Patient notified.    Amparo Antunez PharmD, [facility]  Medication Therapy Management Provider  Pager: [phone]

## 2022-10-13 NOTE — TELEPHONE ENCOUNTER
Natasha PT called to request DME for overhead trapeze for hospital bed    Routing for review     They would like order sent to same DME provider for hospital bed

## 2022-10-14 NOTE — PROGRESS NOTES
Alfredo is a 77 year old who is being evaluated via a billable telephone visit.      What phone number would you like to be contacted at? [phone]  How would you like to obtain your AVS? Malinda Skelton   Alfredo is a 77 year old, presenting for the following health issues:  Follow Up      HPI     Coronary artery disease involving native coronary artery of native heart without angina pectoris  Microscopic colitis, unspecified microscopic colitis type   Commnuity acquired pneumonia   Efrem Ramos has completed antibiotics.  He still has occasional wheezing, but not much coughing.  Oxygenation has been staying up in the 90's.  He is gaining some strength and able to walk to the bathroom and back.  Uses urinal also.  He has been laying in hospital bed which is adjustable. His son notes that is he is not in a laying position he will have severe back pain and not feasible to be up in chair.  He notes that he would prefer to stay at home, and he is certain that he would be willing to go back to the hospital.    Review of Systems   Constitutional - general fatigue, HEENT, cardiovascular - history of congestive heart failure, pulmonary - diatsolic congestive heart failure and congestion, GI, , musculoskeletal - , neuro, skin, endocrine and psych systems are negative, except as otherwise noted.      Objective    Vitals - Patient Reported  Pain Score: Extreme Pain (8)  Pain Loc: Low Back (neck pain)      Vitals:  No vitals were obtained today due to virtual visit.    Physical Exam   healthy, alert and no distress  PSYCH: Alert and oriented times 3; coherent speech, normal   rate and volume, able to articulate logical thoughts, able   to abstract reason, no tangential thoughts, no hallucinations   or delusions  His affect is normal  RESP: No cough, no audible wheezing, able to talk in full sentences  Remainder of exam unable to be completed due to telephone visits    (R54) Frailty  (primary encounter  diagnosis)  Comment: Referral to palliative care placed today.  We will also print an advanced directive form and mailed to him.  He is unable to have the strength to coming to the office at this time.  Plan: Palliative Care Referral            (I25.10) Coronary artery disease involving native coronary artery of native heart without angina pectoris  Comment: No significant chest pain.  Continue current meds  Plan: Palliative Care Referral            (K52.839) Microscopic colitis, unspecified microscopic colitis type  Comment: Continue budesonide  Plan: Palliative Care Referral            (J15.9) Community acquired bacterial pneumonia  Comment: Antibiotics have been discontinued.  We discussed that if he were to redevelop ill with pneumonia we would treat him empirically with antibiotics orally, and consider emergency room visit if necessary  Plan: Palliative Care Referral            (I35.0) Severe aortic stenosis  Comment: Referral to palliative care placed  Plan: Palliative Care Referral            (I50.32) Chronic diastolic congestive heart failure (H)  Comment: Referral to palliative care  Plan: Palliative Care Referral            (J44.9) Chronic obstructive pulmonary disease, unspecified COPD type (H)  Comment: as above   Plan: Palliative Care Referral                    Phone call duration: 24 minutes

## 2022-10-20 NOTE — TELEPHONE ENCOUNTER
"Patient's son is requesting a refill of oxycodone. Patient having a lot of back pain and acetaminophen and heat are not effective to control his pain. \"Today is a really bad day\" and not sure what else to do.    Natasha Park, Pharm.D, BCACP  Medication Therapy Management Pharmacist  Covering for Amparo Freeman.D BCACP  "

## 2022-10-21 NOTE — TELEPHONE ENCOUNTER
"Writer called patient to triage [employer] messag:     Writer talked with Efrem Mojica, C2C- son.     CC: constipation  LBM: a few days  Vomiting: patient had one emesis yesterday but none today  Denies abdominal pain, fever, blood in stool  Medication interventions: patient started senna yesterday  Passing flatus  Patient son states the other day patient had \"water\" stool in commode, patient's son states that it was not urine.   Patient is on feeding tube.  Patient also having back pain     Triaged per Epic Triage Protocol, gave care advice to be seen in ER/UC based on recent vomiting, writer explained the concern is that patient may have bowel obstruction. Patient's son expressed understanding but would like to try the Senna to see if it works tonight, if not son will bring patient in to UC/ER tomorrow. Writer again reiterated recommendation to be seen today and writer explained how bowel obstruction is an emergency as there is a risk for bowel perforation. Patient son expressed understanding will wait until tomorrow to bring patient into UC or ER.     PCP patients son wanted writer to follow up on question about   Budesonide in [employer] message. Patient wondering if patient should continue to take medication.       Callback: [phone]- ok to leave detailed VM.     Sharon Zaragoza RN  [facility]        Reason for Disposition    MILD constipation    Additional Information    Negative: Abdomen pain is main symptom and male    Negative: Abdomen pain is main symptom and female    Negative: Rectal bleeding or blood in stool is main symptom    Negative: Vomiting bile (green color)    Negative: Patient sounds very sick or weak to the triager    Negative: Constant abdominal pain lasting > 2 hours    Negative: Vomiting and abdomen looks much more swollen than usual    Negative: Rectal pain or fullness from fecal impaction (rectum full of stool) and NOT better after SITZ bath, suppository or enema    Negative: " Abdomen is more swollen than usual    Negative: Last bowel movement (BM) > 4 days ago    Negative: Leaking stool    Negative: Intermittent mild abdominal pain and fever    Negative: Unable to have a bowel movement (BM) without manually removing stool (using finger to pull out stool or perform disimpaction)    Negative: Unable to have a bowel movement (BM) without using a laxative, suppository, or enema    Negative: Constipation persists > 1 week and no improvement after using CARE ADVICE    Negative: Weight loss greater than 10 pounds (5 kg) and not dieting    Negative: Pencil-like, narrow stools    Negative: Patient wants to be seen    Negative: Uses laxative (e.g., PEG / Miralax. milk of magnesia) or enema more than once a month    Negative: Constipation is a recurrent ongoing problem (i.e., < 3 BMs / week or straining > 25% of the time)    Negative: Minor bleeding from rectum (e.g., blood just on toilet paper, few drops, streaks on surface of normal formed BM) occurs more than twice    Protocols used: CONSTIPATION-A-OH

## 2022-10-21 NOTE — TELEPHONE ENCOUNTER
Can we call Efrem Ramos and let him know that   Oxycodone is quite sedating and would not be recommended with concern for constipation or nausea and vomiting     Recommend use of current medications  - diclofenac gel, lidocaine patch and gabapentin - OK to increase gabapentin to 300 mg in the AM and lunch as well     Danny Paige MD, MD

## 2022-10-21 NOTE — TELEPHONE ENCOUNTER
This is a recent snapshot of the patient's [facility] medical record.  For current drug dose and complete information and questions, call [phone] or In [facility] pool, fv home infusion ([id number])  CSN Number:  [id number]       Roxanne   [phone]

## 2022-10-21 NOTE — TELEPHONE ENCOUNTER
I think it would be reasonable to continue on the budesonide as I do not believe this is causing the constipation.  That being said, reducing the dose to half would be reasonable as well and we can titrate it back up to full dose once the symptoms resolve    Danny Paige MD

## 2022-10-21 NOTE — TELEPHONE ENCOUNTER
FYI-  Pt son Efrem was called with providers message. States he can't reduce budesonide since its a capsule. Pt was having a BM during call. Son asked if he can results Budesonide. Triage advised he resume medication if having a BM.

## 2022-10-24 NOTE — TELEPHONE ENCOUNTER
Call to patient. Spoke with patient's son, on consent to communicate. Son informed of Dr. Paige's below response. Son verbalized understanding.     Corrina Machado, RN BSN MSN  [facility]

## 2022-10-27 NOTE — TELEPHONE ENCOUNTER
"CC: Thalia RN case manager [facility] calling to report that during home care visit, patient had low O2 saturations, states saturations were in the 70s & 80s  And \"He looked gaunt\" \"He is failing to thrive in his home\"    Thalia states that she advised that patient needed to go to ER. Per Thalia, patient refused multiple times. Per Thalia, patient has also refused to enroll in hospice.     Thalia Callback [phone] - confidential      Writer attempted to call patient with no answer, writer then called patient's son Efrem (C2C)    Efrem states that the patient is currently sleeping with CPAP machine on, states his saturations with CPAP are around 90-91 % and without the CPAP are 85%    RESPIRATORY STATUS: Son states \"he sounded really crackly yesterday\" \"He seems to be breathing okay now\"  ONSET: yesterday   PATTERN: constant since onset yesterday   SEVERITY: unable to assess patient, speaking with son   RECURRENT SYMPTOM: hx of MEL, COPD, pneumonia, hypoxia  CARDIAC HISTORY: A fib, SVT, HTN, PVD  LUNG HISTORY: hx of MEL, COPD, pneumonia, hypoxia  CAUSE: unsure, Thalia reports patient appears to be declining rapidly at home   OTHER SYMPTOMS: denies dizziness, runny nose, cough, chest pain, fever  O2 SATURATION MONITOR: currently 90-91% with CPAP  TRAVEL: denies     Per protocol, patient to go to ED now. Son states patient has refused ED multiple times and has made the decision he will not go to hospital. Per Thalia, patient has also refused this recommendation multiple times.    Writer advised to suman Topete that option of calling an ambulance to have EMS come to home and assess patient. Son declines at this time and states \"He won't go\"    Routing to PCP as FYI patient was advised to go to ED and has refused multiple times     Callback suman Topete (C2C) [phone] - ok to leave detailed      Mita Siu RN  [facility]    Reason for Disposition    Oxygen level (e.g., pulse oximetry) " 90 percent or lower    Additional Information    Negative: SEVERE difficulty breathing (e.g., struggling for each breath, speaks in single words, pulse > 120)    Negative: Breathing stopped and hasn't returned    Negative: Choking on something    Negative: Bluish (or gray) lips or face    Negative: Difficult to awaken or acting confused (e.g., disoriented, slurred speech)    Negative: Passed out (i.e., fainted, collapsed and was not responding)    Negative: Wheezing started suddenly after medicine, an allergic food, or bee sting    Negative: Stridor    Negative: Slow, shallow and weak breathing    Negative: Sounds like a life-threatening emergency to the triager    Negative: Chest pain    Negative: Wheezing (high pitched whistling sound) and previous asthma attacks or use of asthma medicines    Negative: Difficulty breathing and within 14 days of COVID-19 Exposure    Negative: Difficulty breathing and only present when coughing    Negative: Difficulty breathing and only from stuffy nose    Negative: Difficulty breathing and only from stuffy nose or runny nose from common cold    Negative: MODERATE difficulty breathing (e.g., speaks in phrases, SOB even at rest, pulse 100-120) of new-onset or worse than normal    Protocols used: BREATHING DIFFICULTY-A-OH

## 2022-10-28 NOTE — TELEPHONE ENCOUNTER
"Pts son Efrem called back-     Relayed Dr. Paige's message. Pts son stated he agrees with this. Pts son stated pt has refused ED multiple times. Writer offered to call 911 for the son but the son stated \"no\".     Pts son stated he thinks he pt may need hospice but the pt will not agree to that.    Pts son stated the pt dosnt want to get admitted to the hospital and just wants oxygen. Informed pts son that writer nor the PCP can make any promise on that as we are not there to assess pt and it is not our decision/recommendation and that it would be up to the ED provider to perform the assessment and provide recommendations.     Pts son stated that he will try to get pt to go to the ED today. Advised pts son to call us back with any other questions.     Writer called 911 for a wellfare check. 911 stated they would send someone out to check on pt.   "

## 2022-10-28 NOTE — TELEPHONE ENCOUNTER
"Call attempted to pt. -recording \"this mailbox is not accepting messages at this time\". Left voice message for patients some Efrem asking him to call triage back. On call back, please let son know PCP advised that pt be seen at ER.  See message below.   "

## 2022-10-29 PROBLEM — R05.9 COUGH, UNSPECIFIED TYPE: Status: ACTIVE | Noted: 2022-01-01

## 2022-10-29 PROBLEM — J84.10 LUNG FIBROSIS (H): Status: ACTIVE | Noted: 2022-01-01

## 2022-10-29 NOTE — CONSULTS
CLINICAL NUTRITION SERVICES  -  ASSESSMENT NOTE      Recommendations Ordered by Registered Dietitian (RD):   Continue TF as ordered, TwoCal HN at 30 mL/hr = = 1440 kcals, 60 gm pro (1.3 gm/kg), 158 gm CHO, 4 gm fiber, 504 mL H20.    Nutrisource Fiber 1 packet per day = 15 kcals, 3 gm fiber  Banatrol (prebiotic fiber) TID = 120 kcals, 6 gm fiber.  Total:  1575 kcals (33 kcal/kg), 13 gm fiber.  Free H20 flushes 120 mL every 4 hrs (720 mL). Total Fluid (TF + flushes):  1225 mL (26 mL/kg).  Continue Liquid MVI-M - per PI protocol.   Malnutrition:   % Weight Loss:  > 7.5% in 3 months (severe malnutrition)  % Intake:  No decreased intake noted  Subcutaneous Fat Loss:  Orbital region moderate depletion and Upper arm region moderate depletion  Muscle Loss:  Temporal region severe depletion, Clavicle bone region severe depletion, Acromion bone region severe depletion, Dorsal hand region moderate depletion, Patellar region mod-severe depletion, Anterior thigh region mod-severe depletion and Posterior calf region mod-severe depletion  Fluid Retention:  None noted    Malnutrition Diagnosis: Severe malnutrition  In Context of:  Chronic illness or disease        REASON FOR ASSESSMENT  Efrem Ramos is a 77 year old male seen by Registered Dietitian for Provider Order - Registered Dietitian to Assess and Order TF per Medical Nutrition Therapy Protocol    NUTRITION HISTORY  - Information obtained from Epic records and son Efrem (phone visit).  - Tube feeding history:   Historically, patient has been on multiple TF products, including Jevity 1.5, Promote with Fiber, Vital 1.5, and more recently, TwoCal HN.  Pt receives TF via G-tube, TwoCal HN at 30 mL/hr = 1440 kcals, 60 gm pro (1.3 gm/kg), 158 gm CHO, 4 gm fiber, 504 mL H20.    Nutrisource Fiber 1 packet per day = 15 kcals, 3 gm fiber  Banatrol (prebiotic fiber) TID = 120 kcals, 6 gm fiber.  Total:  1575 kcals (33 kcal/kg), 13 gm fiber.  Free H20 flushes are 120 mL every 4 hrs  "(720 mL) for total fluid 1225 mL (26 mL/kg).  - Son reports that his father has gotten Senna recently for constipation (when previously had diarrhea), and they are trying to find a happy medium with regards to bowel pattern.  No food allergies noted.    CURRENT NUTRITION ORDERS  Diet Order:     NPO   TF is running TwoCal HN at 30 mL/hr + 1 packet Nutrisource Fiber per day on MAR.   No free H20 flushes ordered.    Current Intake/Tolerance:  No BM yet.    NUTRITION FOCUSED PHYSICAL ASSESSMENT FOR DIAGNOSING MALNUTRITION)  Yes                Observed:    Muscle wasting (refer to documentation in Malnutrition section), Subcutaneous fat loss (refer to documentation in Malnutrition section) and Lackluster hair    Obtained from Chart/Interdisciplinary Team:  Severe protein calorie malnutrition  Cachexia  Stage I sacral pressure ulceration    ANTHROPOMETRICS  Height: 5' 7\"  Weight: 47.2 kg (104 lbs 0 oz)  Body mass index is 16.29 kg/m .  Weight Status:  Underweight BMI <18.5  IBW: 67.3 kg  % IBW: 70%  Weight History:   - Weight stable over past month  - Weight loss 5.4 kg (10%) over past ~ 2 months    Wt Readings from Last 10 Encounters:   10/28/22 47.2 kg (104 lb)   09/24/22 47.2 kg (104 lb)   09/05/22 52.6 kg (116 lb)   08/30/22 54 kg (119 lb 1.6 oz)   08/24/22 53.8 kg (118 lb 9.6 oz)   08/22/22 57.2 kg (126 lb 1.6 oz)   08/17/22 57.2 kg (126 lb 1.6 oz)   08/17/22 57.2 kg (126 lb 1.6 oz)   08/15/22 57.2 kg (126 lb 1.6 oz)   08/09/22 64.1 kg (141 lb 6.4 oz)     LABS  Labs reviewed  Na 132 (L)  Alk Phos and LFTs elevated    MEDICATIONS  Medications reviewed  Calcium Carbonate  Nutrisource Fiber 1 pkt per day  Remeron  MVI-M liquid    ASSESSED NUTRITION NEEDS PER APPROVED PRACTICE GUIDELINES:    Dosing Weight:  47.2 kg (10/28)   Estimated Energy Needs: 2236-0867 kcals (30-35 Kcal/Kg)  Justification: repletion and underweight  Estimated Protein Needs: 57-70 grams protein (1.2-1.5 g pro/Kg)  Justification: Repletion, wound " healing and hypercatabolism with acute illness  Estimated Fluid Needs: 2274-8326 mL (1 mL/Kcal)  Justification: maintenance and per provider pending fluid status    MALNUTRITION:  % Weight Loss:  > 7.5% in 3 months (severe malnutrition)  % Intake:  No decreased intake noted  Subcutaneous Fat Loss:  Orbital region moderate depletion and Upper arm region moderate depletion  Muscle Loss:  Temporal region severe depletion, Clavicle bone region severe depletion, Acromion bone region severe depletion, Dorsal hand region moderate depletion, Patellar region mod-severe depletion, Anterior thigh region mod-severe depletion and Posterior calf region mod-severe depletion  Fluid Retention:  None noted    Malnutrition Diagnosis: Severe malnutrition  In Context of:  Chronic illness or disease    NUTRITION DIAGNOSIS:  Inadequate fluid intake related to lack of free H20 flushes as evidenced by TF providing only 11 mL/kg.    NUTRITION INTERVENTIONS  Recommendations / Nutrition Prescription  Continue TF as ordered, TwoCal HN at 30 mL/hr = = 1440 kcals, 60 gm pro (1.3 gm/kg), 158 gm CHO, 4 gm fiber, 504 mL H20.    Nutrisource Fiber 1 packet per day = 15 kcals, 3 gm fiber  Banatrol (prebiotic fiber) TID = 120 kcals, 6 gm fiber.  Total:  1575 kcals (33 kcal/kg), 13 gm fiber.  Free H20 flushes 120 mL every 4 hrs (720 mL). Total Fluid (TF + flushes):  1225 mL (26 mL/kg).  Continue Liquid MVI-M - per PI protocol.    Implementation  Nutrition education: Not appropriate at this time due to patient condition  Feeding Tube Flush and Medical Food Supplement - Ordered in Epic as above    Nutrition Goals  TF + fiber modules + H20 flushes will meet % estimated needs    MONITORING AND EVALUATION:  Progress towards goals will be monitored and evaluated per protocol and Practice Guidelines    Hemalatha Herbert, RD, LD, CNSC

## 2022-10-29 NOTE — PLAN OF CARE
Goal Outcome Evaluation:      Plan of Care Reviewed With: patient    Overall Patient Progress: improvingOverall Patient Progress: improving     Pt is A&Ox4. VSS except BP hypotensive but is improving. Family requests pt be on continuous pulse ox to monitor. Oxygen sat maintained at 91-92 on RA. Pt is on tele and shows A-fib with CVR. Tube feeding to G-tube at 30mL/hr. Medications given through G-tube. Strict NPO no exceptions. Reposition frequently, nonblanchable redness on coccyx, mepilex applied. Pt reports no pain. Continue to monitor.

## 2022-10-29 NOTE — CONSULTS
[facility]    Cardiology Consultation     Date of Admission:  10/29/2022    Assessment & Plan   Efrem Ramos is a 77 year old male who was admitted on 10/29/2022.    1.  Acute on chronic respiratory failure, multifactorial.    2.  Pulmonary fibrosis, possibly due to chronic aspiration    3.  Acute on chronic heart failure with preserved ejection fraction.    4.  Severe aortic stenosis, not a candidate for surgical or transcatheter aortic valve replacement due to frailty and severe comorbidities.  See inpatient TAVR evaluation September 2022.    5.  Severe tricuspid regurgitation with chronic right heart failure    6.  Chronic atrial fibrillation with rapid ventricular response.  On apixaban for stroke prevention    7.  Chronic hypotension, limiting to heart rate control medications    8.  Coronary artery disease with history of complex interventions.  No current symptoms of angina.    9.  Obstructive sleep apnea    10.  Chronic macrocytic anemia    Recommendations:    Unfortunately, we have no reasonable options for restorative cardiac care in this situation.  I agree with palliative care and possibly hospice referrals.  We can provide diuresis as a palliative treatment for shortness of breath, although much of his respiratory failure issues are not related to congestive heart failure.    The options of TAVR and SAVR have clearly been eliminated from further discussion based on the evaluations recently in September.  Without aortic valve replacement, the natural history of severe aortic stenosis would suggest progressive and recurrent heart failure exacerbations.    Heart rate control would probably help, but his blood pressures are so low that he would tolerate only digoxin at this point.  I would continue low-dose digoxin 125 mcg daily.  If his blood pressure improves, we could consider adding a low-dose of metoprolol tartrate 12.5 mg p.o. twice daily.    High complexity     LEX  RAJI RENEE MD, MD    Primary Care Physician   Danny Paige MD    Reason for Consult   Reason for consult: I was asked by Dr. Franks of the hospitalist service to evaluate this patient for acute on chronic respiratory failure.    History of Present Illness   Efrem Ramos is a 77 year old male who presents with worsening shortness of breath and hypoxia at home.  This has been going on for about a week, associated with coughing.  This is, and since he has issues with chronic aspiration.  His oxygen saturations at home were in the 80s and he eventually consented to go to the emergency room.    His cardiac history is extensive, including multiple coronary interventions including stenting of the right coronary artery.  He has severe low-flow low gradient aortic stenosis with an aortic valve area of 0.7 cm  and a mean aortic valve gradient of 25 mmHg, as well as severe tricuspid regurgitation, moderate pulmonary hypertension, and evidence of right ventricular volume overload.  He also has chronic atrial fibrillation which tends to run fast since he has low blood pressure and cannot tolerate rate controlling medications.  In fact, he is on no cardiac medications except digoxin and Eliquis.    He has been evaluated for transcatheter aortic valve replacement in the past but is not a candidate due to frailty and multiple severe comorbidities.  This was recently explored again during his hospitalization in September 2022.  His NT proBNP is now 4900.  His CT chest shows pulmonary fibrosis with a right upper lobe infiltrate, somewhat improved since the previous scan but continues to show evidence of pneumonitis.    Past Medical History   Past Medical History:   Diagnosis Date     Atrial fibrillation (H)     amiodarone therapy discontinued due to pulmonary toxicity     Atrial flutter (H)      Benign essential hypertension 11/20/2018     Cancer (H) vocal cord     Carpal tunnel syndrome     abstracted 7/3/02.      Coronary artery disease involving native coronary artery of native heart without angina pectoris 05/08/2020    Cath 5/28/2020: patent stents; Cath 5/18/2020: ISABEL x4 to RCA; Cath 3/2020: 1 vessel disease; Cath 2017: moderate 2 vessel disease     CVA (cerebral infarction) 05/05/2015     Demyelinating disease of central nervous system, unspecified (H)     abstracted 7/3/02. 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     Pneumonia 08/17/2016     PVD (peripheral vascular disease) (H)      Redundant colon     needs CT colonography     Shingles      SKIN DISORDERS NEC 03/15/2005     Sleep apnea      Sleep apnea      SVT (supraventricular tachycardia) (H)          Past Surgical History   Past Surgical History:   Procedure Laterality Date     BIOPSY  brain 2002     BONE MARROW BIOPSY, BONE SPECIMEN, NEEDLE/TROCAR N/A 6/8/2017    Procedure: BIOPSY BONE MARROW;  UNILATERAL BONE MARROW BIOPSY (CONSCIOUS SEDATION) ;  Surgeon: Jamie Gonzales MD;  Location:  GI     BONE MARROW BIOPSY, BONE SPECIMEN, NEEDLE/TROCAR N/A 2/23/2022    Procedure: BIOPSY, BONE MARROW;  Surgeon: Carmelita Aguilera MD;  Location:  GI     CARDIAC CATHERIZATION  09/05/2017    2V CAD, IFR of RCA 0.95     CV CORONARY ANGIOGRAM N/A 3/23/2020    Procedure: Coronary Angiogram and right heart cath;   Surgeon: Gino Ferrer MD;  Location:  HEART CARDIAC CATH LAB     CV HEART CATHETERIZATION WITH POSSIBLE INTERVENTION N/A 5/28/2020    Procedure: Heart Catheterization with Possible Intervention;  Surgeon: Larry Chawla MD;  Location:  HEART CARDIAC CATH LAB     CV HEART CATHETERIZATION WITH POSSIBLE INTERVENTION N/A 5/18/2020    Procedure: Heart Catheterization with Possible Intervention;  Surgeon: Gaurang Swenson MD;  Location:  HEART CARDIAC CATH LAB     CV INSTANTANEOUS WAVE-FREE RATIO N/A 3/23/2020    Procedure: Instantaneous Wave-Free Ratio;  Surgeon: Gino Ferrer MD;  Location:  HEART CARDIAC CATH LAB     CV PCI ATHERECTOMY ORBITAL N/A 5/18/2020    Procedure: Percutaneous Coronary Intervention Atherectomy Rotational;  Surgeon: Gaurang Swenson MD;  Location:  HEART CARDIAC CATH LAB     CV PCI STENT DRUG ELUTING N/A 5/18/2020    Procedure: Percutaneous Coronary Intervention Stent Drug Eluting;  Surgeon: Gaurang Swenson MD;  Location:  HEART CARDIAC CATH LAB     CV RIGHT HEART CATH MEASUREMENTS RECORDED N/A 5/28/2020    Procedure: Right Heart Cath;  Surgeon: Larry Chawla MD;  Location:  HEART CARDIAC CATH LAB     CV TEMPORARY PACEMAKER INSERTION N/A 5/18/2020    Procedure: Temporary Pacemaker Insertion;  Surgeon: Gaurang Swenson MD;  Location:  HEART CARDIAC CATH LAB     ESOPHAGOSCOPY, GASTROSCOPY, DUODENOSCOPY (EGD), COMBINED N/A 9/8/2018    Procedure: COMBINED ESOPHAGOSCOPY, GASTROSCOPY, DUODENOSCOPY (EGD), BIOPSY SINGLE OR MULTIPLE;;  Surgeon: Xander Marie MD;  Location:  GI     HEAD & NECK SURGERY       IR GASTROSTOMY TUBE PERCUTANEOUS PLCMNT  7/27/2022     LAPAROSCOPIC ASSISTED INSERTION TUBE GASTROTOMY N/A 7/28/2022    Procedure: LAPAROSCOPIC ASSISTED GASTROSTOMY TUBE PLACEMENT;  Surgeon: Vicente Weber MD;  Location:  OR     ORTHOPEDIC SURGERY      right arm ulna reset after injury     THORACOSCOPIC WEDGE RESECTION  LUNG Right 2016    Procedure: THORACOSCOPIC WEDGE RESECTION LUNG;  Surgeon: Abdelrahman Noriega MD;  Location:  OR     [facility] NONSPECIFIC PROCEDURE  as a child    T & A. abstracted 7/3/02.     ZZC NONSPECIFIC PROCEDURE  early    CTR. abstracted 7/3/02.         Prior to Admission Medications   Prior to Admission Medications   Prescriptions Last Dose Informant Patient Reported? Taking?   Aspirin Effervescent (WENDY-SELTZER ORIGINAL PO)   Yes No   Sig: Take 2 tablets by mouth 2 times daily   Banana Flakes (BANATROL PLUS) Not Taking  No No   Si packet by Per Feeding Tube route 3 times daily   Patient not taking: Reported on 10/29/2022   Guar Gum (FIBER MODULAR, NUTRISOURCE FIBER,) packet Not Taking  No No   Si packet by Per Feeding Tube route daily   Patient not taking: Reported on 10/29/2022   Lidocaine (LIDOCARE) 4 % Patch Not Taking Nursing Home Yes No   Sig: Place 1 patch onto the skin every 24 hours To prevent lidocaine toxicity, patient should be patch free for 12 hrs daily. Apply to abdomen   Patient not taking: Reported on 10/29/2022   Multiple Vitamins-Minerals (PRESERVISION AREDS 2 PO) Not Taking  Yes No   Sig: Take 1 chew tab by mouth 2 times daily   Patient not taking: Reported on 10/29/2022   Phenylephrine-Acetaminophen 5-325 MG TABS 10/28/2022  Yes Yes   Sig: Take 1 tablet by mouth 2 times daily Equate cold and flu   acetaminophen (TYLENOL) 500 MG tablet 10/28/2022 Nursing Home Yes Yes   Si,000 mg by Per G Tube route 3 times daily as needed   apixaban ANTICOAGULANT (ELIQUIS) 5 MG tablet 10/28/2022 at 1200 Nursing Home No Yes   Sig: Take 1 tablet (5 mg) by mouth 2 times daily   Patient taking differently: 5 mg by Per G Tube route 2 times daily   atorvastatin (LIPITOR) 40 MG tablet 10/27/2022 Nursing Home No Yes   Sig: Take 1 tablet (40 mg) by mouth daily   Patient taking differently: 40 mg by Per G Tube route At Bedtime   budesonide (ENTOCORT EC) 3 MG EC capsule 10/28/2022 at 1200  No Yes    Sig: Take 1 capsule (3 mg) by mouth 3 times daily Crushed and given hrough gastrostomy tube   Patient taking differently: Take 3 mg by mouth 2 times daily Opens capsules and gives enteric-coated, modified-release granules through gastrostomy tube (G-tube). Do not crush granules.   calcium carbonate 600 mg-vitamin D 400 units (CALTRATE) 600-400 MG-UNIT per tablet 10/28/2022 at 1200 Nursing Home Yes Yes   Si tablet by Per G Tube route 2 times daily   diclofenac (VOLTAREN) 1 % topical gel Not Taking Nursing Home No No   Sig: Apply 4 g topically 4 times daily as needed for moderate pain   Patient not taking: Reported on 10/29/2022   digoxin (LANOXIN) 125 MCG tablet 10/28/2022 at 1200 Nursing Home No Yes   Sig: Take 1 tablet (125 mcg) by mouth daily   Patient taking differently: 125 mcg by Per G Tube route daily   gabapentin (NEURONTIN) 300 MG capsule 10/28/2022 at 1200  No Yes   Sig: Take 300 mg AM, and 300 mg at lunch, and 600 mg at bedtime   melatonin 5 MG tablet 10/27/2022 Nursing Home Yes Yes   Si mg by Per G Tube route At Bedtime   mirtazapine (REMERON) 15 MG tablet 10/27/2022 Nursing Home No Yes   Sig: Take 1 tablet (15 mg) by mouth At Bedtime   Patient taking differently: 15 mg by Per G Tube route At Bedtime   multivitamin, therapeutic (THERA-VIT) TABS tablet 10/28/2022 at 1200  Yes Yes   Sig: Take 1 tablet by mouth daily   potassium chloride (KLOR-CON) 20 MEQ packet 10/28/2022 at 1200  No Yes   Si mEq by Per G Tube route daily   senna-docusate (SENOKOT-S/PERICOLACE) 8.6-50 MG tablet Past Month Nursing Home Yes Yes   Si tablets by Per G Tube route 2 times daily as needed for constipation      Facility-Administered Medications: None     Current Facility-Administered Medications   Medication Dose Route Frequency     apixaban ANTICOAGULANT  5 mg Per G Tube BID     atorvastatin  40 mg Per G Tube At Bedtime     [START ON 10/30/2022] azithromycin  250 mg Intravenous Q24H     budesonide  3 mg Oral BID      calcium carbonate  1,500 mg Per G Tube BID w/meals     cefTRIAXone  2 g Intravenous Q24H     digoxin  125 mcg Oral or G tube Daily     fiber modular  1 packet Per Feeding Tube TID     fiber modular (NUTRISOURCE FIBER)  1 packet Per Feeding Tube Daily     gabapentin  300 mg Oral or G tube BID     gabapentin  600 mg Oral At Bedtime     mirtazapine  15 mg Per G Tube At Bedtime     multivitamins w/minerals  15 mL Per G Tube Daily     potassium chloride  20 mEq Per G Tube Daily     sodium chloride (PF)  3 mL Intracatheter Q8H     Current Facility-Administered Medications   Medication Last Rate     - MEDICATION INSTRUCTIONS -       Allergies   Allergies   Allergen Reactions     Sulfa Drugs Difficulty breathing, Swelling and Hives     Adhesive Tape Blisters     Amiodarone Other (See Comments)     Developed pleural effusion     Latex Unknown     Cephalosporins      Tolerated ceftriaxone      Penicillins Hives     Reaction occurred as a child  Tolerated ceftriaxone in past   Other reaction(s): Hives       Social History    reports that he quit smoking about 9 years ago. His smoking use included cigarettes. He has a 30.00 pack-year smoking history. He has never used smokeless tobacco. He reports that he does not currently use alcohol after a past usage of about 42.0 standard drinks per week. He reports that he does not use drugs.      Family History   I have reviewed this patient's family history and updated it with pertinent information if needed.  Family History   Problem Relation Age of Onset     Blood Disease Mother         Anemia     Cardiovascular Father      Cancer - colorectal Maternal Grandfather      Hypertension Brother      Diabetes Sister 5        Juvinile Diabetes passed at 36     Respiratory Other         Lung Cancer          Review of Systems   A comprehensive review of system was performed and is negative other than that noted in the HPI or here.     Physical Exam   Vital Signs with Ranges  Temp:  [97.7  " F (36.5  C)-98.5  F (36.9  C)] 98.3  F (36.8  C)  Pulse:  [] 95  Resp:  [16-20] 16  BP: ()/(47-73) 99/62  SpO2:  [84 %-99 %] 91 %  Wt Readings from Last 4 Encounters:   10/28/22 47.2 kg (104 lb)   09/24/22 47.2 kg (104 lb)   09/05/22 52.6 kg (116 lb)   08/30/22 54 kg (119 lb 1.6 oz)     No intake/output data recorded.      Vitals: BP 99/62 (BP Location: Right arm)   Pulse 95   Temp 98.3  F (36.8  C) (Oral)   Resp 16   Ht 1.702 m (5' 7\")   Wt 47.2 kg (104 lb)   SpO2 91%   BMI 16.29 kg/m      Physical Exam:   General - Alert and oriented to time place and person in no acute distress  Eyes - No scleral icterus  HEENT - Neck supple, moist mucous membranes  Cardiovascular -irregularly irregular with systolic murmur at the base  Extremities - There is no lower extremity edema  Respiratory -diffuse crackles  Skin - No pallor or cyanosis  Gastrointestinal - Non tender and non distended without rebound or guarding  Psych - Appropriate affect   Neurological - No gross motor neurological focal deficits    No lab results found in last 7 days.    Invalid input(s): TROPONINIES    Recent Labs   Lab 10/28/22  1805   WBC 21.1*   HGB 8.6*   *      *   POTASSIUM 4.5   CHLORIDE 96   CO2 34*   BUN 23   CR 0.50*   GFRESTIMATED >90   ANIONGAP 2*   ULISSES 8.5   *   ALBUMIN 2.2*   PROTTOTAL 8.2   BILITOTAL 0.7   ALKPHOS 351*   *   AST 88*     Recent Labs   Lab Test 10/26/21  1627 06/24/19  1023 11/09/16  1246 09/04/15  1040 05/29/15  1203   CHOL 92 118   < > 162 189   HDL 45 51   < > 59 80   LDL 37 51   < > 83 93   TRIG 52 80   < > 100 80   CHOLHDLRATIO  --   --   --  2.7 2.4    < > = values in this interval not displayed.     Recent Labs   Lab 10/28/22  1805   WBC 21.1*   HGB 8.6*   HCT 28.0*   *        No results for input(s): PH, PHV, PO2, PO2V, SAT, PCO2, PCO2V, HCO3, HCO3V in the last 168 hours.  Recent Labs   Lab 10/28/22  1805   NTBNPI 4,909*     No results for " input(s): DD in the last 168 hours.  No results for input(s): SED, CRP in the last 168 hours.  Recent Labs   Lab 10/28/22  1805        No results for input(s): TSH in the last 168 hours.  Recent Labs   Lab 10/29/22  0327   COLOR Yellow   APPEARANCE Clear   URINEGLC Negative   URINEBILI Negative   URINEKETONE Negative   SG 1.021   UBLD Negative   URINEPH 8.0*   PROTEIN 30*   NITRITE Negative   LEUKEST Negative   RBCU 3*   WBCU 1       Imaging:  Recent Results (from the past 48 hour(s))   XR Chest 2 Views    Narrative    EXAM: XR CHEST 2 VIEWS  LOCATION: [facility]  DATE/TIME: 10/28/2022 6:30 PM    INDICATION: Hypoxemia.  COMPARISON: 09/20/2022.      Impression    IMPRESSION: Right greater than left interstitial opacities are unchanged from prior examination. Superimposed patchy airspace disease at the right mid to upper lung is slightly improved, favoring residual pneumonia. Continued imaging follow-up to   resolution is recommended.    Unchanged small right pleural effusion. No pneumothorax.    Cardiomediastinal silhouette is normal. Dense calcifications of the mitral annulus. Atheromatous calcifications of the thoracic aorta.   CT Chest Hi-Resolution wo Contrast    Narrative    EXAM: CT CHEST HI-RESOLUTION WO CONTRAST  LOCATION: [facility]  DATE/TIME: 10/29/2022 2:08 AM    INDICATION: Short of breath. concern for pneumonia versus fibrosis versus edema  COMPARISON: 09/07/2022  TECHNIQUE: High resolution images were obtained through the chest during inspiration with select expiratory views. Prone imaging was performed. Multiplanar reformats were obtained. Dose reduction techniques were used.  CONTRAST: None.    FINDINGS:   LUNGS AND PLEURA: Pulmonary fibrosis with traction bronchiectasis and honeycombing again noted. Interval improvement in superimposed infiltrate seen previously. Pneumonitis in the right upper lobe not excluded. Small right  "effusion.    MEDIASTINUM/AXILLAE: Mediastinal adenopathy is again seen. Atherosclerotic aorta. Mitral annular calcification. Small amount of pericardial fluid similar.    CORONARY ARTERY CALCIFICATION: Severe.    UPPER ABDOMEN: Cholelithiasis.    MUSCULOSKELETAL: Degenerative change osseous structures. Prior vertebral plasty. Lower thoracic compression fractures.      Impression    IMPRESSION:   1.  Pulmonary fibrosis. Improvement in previously seen superimposed infiltrates. There is persistent interstitial thickening right upper lung which could be pneumonitis superimposed on underlying fibrosis.  2.  Small right effusion.  3.  Cholelithiasis       Echo:  No results found for this or any previous visit (from the past 4320 hour(s)).    Clinically Significant Risk Factors Present on Admission        # Hyponatremia: Lowest Na = 132 mmol/L (Ref range: 136-145) in last 2 days, will monitor as appropriate      # Hypoalbuminemia: Lowest albumin = 2.2 g/dL (Ref range: 3.5-5.2) at 10/28/2022  6:05 PM, will monitor as appropriate   # Drug Induced Coagulation Defect: home medication list includes an anticoagulant medication   # Severe Malnutrition: based on nutrition assessment  # Cachexia: Estimated body mass index is 16.29 kg/m  as calculated from the following:    Height as of this encounter: 1.702 m (5' 7\").    Weight as of this encounter: 47.2 kg (104 lb).                                                "

## 2022-10-29 NOTE — PROGRESS NOTES
[facility]    Medicine Progress Note - Hospitalist Service    Date of Admission:  10/29/2022    Assessment & Plan      Efrem Ramos is a 77 year old male admitted on 10/29/2022. He presents to the emergency department after several days of worsening shortness of breath, cough, low oxygen readings on home pulse oximeter.  Generally hesitant to present to the emergency department, though son convinced him to do so.     Acute hypoxic respiratory failure  Possible aspiration pneumonia  Acute on chronic severe valvular heart disease  Severe TR  Severe aortic stenosis  * Suspect respiratory issues are multifactorial.  Patient with known impaired diffusion capacity, pulmonary fibrosis, valvular heart failure, and chronic aspiration.  Patient describes shaking chills and has a significant leukocytosis which might be suggestive of aspiration pneumonia as etiology.  Chest CT improved from prior, however, and patient primarily with coughing symptoms and worsened oxygen saturations when laying flat suggesting more of acute on chronic valvular heart failure as cause of his cough, shortness of breath, and hypoxia. Additionally, he is expectorating frothy white output consistent with cardiac etiology.  As of September 2022, severe tricuspid regurgitation, mild to moderate mitral regurgitation, and severe aortic stenosis with low flow low gradient and aortic valve area of 0.69 cm   -Ceftriaxone and azithromycin for treatment of suspected aspiration pneumonia  -Continue n.p.o. status  -Despite suspicion for valvular heart failure exacerbation, suspect we will be limited here from diuresis standpoint.  Urine is concentrated, and patient appears hypovolemic to possibly euvolemic.  Holding on diuresis currently  -Cardiology consulted for any recommendations regarding further medical management of valvular heart failure.  Note that he was not thought to be a procedural candidate secondary to his medical  frailty, and cardiology has recommended hospice/palliative care in the past.  -Sputum culture and gram stain    Goals of care planning: Patient with pulmonary fibrosis, chronic aspiration, cachexia and severe protein calorie malnutrition, severe aortic stenosis with severe mitral regurgitation.  Discussed consideration for hospice.  This has been mentioned to patient and family multiple times in the past.  Patient's general desire to have therapies available to him yet reluctance to present to the emergency department or hospital and desire not to be hospitalized is consistent with hospice.  For instance, patient was hopeful that he might be able to receive antibiotics and home oxygen and be discharged from the emergency department.  Despite general reluctance to be hospitalized, patient still would like medical treatments, such as antibiotics to be administered.  Patient is generally uncomfortable talking about his mortality.  He expresses a desire to live.  His son at bedside tells me that despite his ailments, he still finds gardenia in life.  Requires essentially 24-hour assistance from son at home, who tells me that he will continue to attempt to provide these cares for his father as long as he is able to do so.  -DO NOT RESUSCITATE, DO NOT INTUBATE.  Discussed with patient as well as son at bedside.  Note that this is a change from previously documented CODE STATUS.    -Note palliative care referral in place as an outpatient    Atrial fibrillation with rapid ventricular rate  -Continue prior to admission apixaban anticoagulation  -Resume prior to admission digoxin   -Digoxin level 1.0 10/29    Coronary artery disease  Last angiogram May 2020.  Has a history of extensive RCA stenting further in the past  -Continue prior to admission apixaban, atorvastatin    Collagenous colitis  * Patient with significant issues with diarrhea after initiation on tube feeds.  Did have response to budesonide; history of biopsy-proven  collagenous colitis in 2005.  -Resume prior to admission budesonide 3 mg daily when solution is available via compound pharmacy    Severe protein calorie malnutrition:  Chronic aspiration:  Stage I sacral pressure ulceration: Appears to have some incontinence irritation plus pressure injury.  Limited mobility at baseline with cachexia place patient at high risk for wounds.  -Nutrition consulted for tube feed resumption  -Free water flushes  -Nothing by mouth  -Wound nurse consulted  -Continue prior to admission Remeron 15 at bedtime    Obstructive sleep apnea:  -CPAP as per home regimen    MGUS  Acute on chronic anemia.  -Continue with outpatient oncology follow-up         Diet: Adult Formula Drip Feeding: Continuous TwoCal HN; Gastrostomy; Goal Rate: 30; mL/hr  NPO for Medical/Clinical Reasons Except for: No Exceptions    DVT Prophylaxis: DOAC  Bravo Catheter: Not present  Central Lines: None  Cardiac Monitoring: ACTIVE order. Indication: Acute decompensated heart failure (48 hours)  Code Status: No CPR- Do NOT Intubate      Disposition Plan      Expected Discharge Date: 10/31/2022                The patient's care was discussed with the Bedside Nurse and Patient.    Dominic Hogan MD  Hospitalist Service  [facility]  Securely message with the Vocera Web Console (learn more here)  Text page via [employer] Paging/Directory         Clinically Significant Risk Factors Present on Admission         # Hyponatremia: Lowest Na = 132 mmol/L (Ref range: 136-145) in last 2 days, will monitor as appropriate      # Hypoalbuminemia: Lowest albumin = 2.2 g/dL (Ref range: 3.5-5.2) at 10/28/2022  6:05 PM, will monitor as appropriate  # Drug Induced Coagulation Defect: home medication list includes an anticoagulant medication      # Acute Respiratory Failure: Documented O2 saturation < 91%.  Continue supplemental oxygen as needed    # Cachexia: Estimated body mass index is 16.29 kg/m  as calculated from the  "following:    Height as of this encounter: 1.702 m (5' 7\").    Weight as of this encounter: 47.2 kg (104 lb).           ______________________________________________________________________    Interval History   Feels maybe a bit better since admission. Did have some low BP earlier this AM, but BP came up without intervention. He did not note any symptoms associated with this. Discussed a bit about goals of care this AM, that he may not even have pna and all his symptoms may be from heart disease.     Data reviewed today: I reviewed all medications, new labs and imaging results over the last 24 hours. I personally reviewed the chest CT image(s) showing fibrosis and interstial findings.    Physical Exam   Vital Signs: Temp: 98.1  F (36.7  C) Temp src: Oral BP: 94/63 Pulse: 85   Resp: 17 SpO2: 99 % O2 Device: Nasal cannula Oxygen Delivery: 2 LPM  Weight: 104 lbs 0 oz  Constitutional: Awake, alert, cooperative, no apparent distress  Respiratory: fine crackles throughout. No clear wheezing or rhonchi, no incresed WOB> on NC.  Cardiovascular: IRR, sys murmur  GI: Normal bowel sounds, soft, non-distended, non-tender  Skin/Integumen: No rashes, no cyanosis, no edema  Other: Cachetic, frail, older than stated age      Data   Recent Labs   Lab 10/28/22  1805   WBC 21.1*   HGB 8.6*   *      *   POTASSIUM 4.5   CHLORIDE 96   CO2 34*   BUN 23   CR 0.50*   ANIONGAP 2*   ULISSES 8.5   *   ALBUMIN 2.2*   PROTTOTAL 8.2   BILITOTAL 0.7   ALKPHOS 351*   *   AST 88*     Recent Results (from the past 24 hour(s))   XR Chest 2 Views    Narrative    EXAM: XR CHEST 2 VIEWS  LOCATION: [facility]  DATE/TIME: 10/28/2022 6:30 PM    INDICATION: Hypoxemia.  COMPARISON: 09/20/2022.      Impression    IMPRESSION: Right greater than left interstitial opacities are unchanged from prior examination. Superimposed patchy airspace disease at the right mid to upper lung is slightly improved, " favoring residual pneumonia. Continued imaging follow-up to   resolution is recommended.    Unchanged small right pleural effusion. No pneumothorax.    Cardiomediastinal silhouette is normal. Dense calcifications of the mitral annulus. Atheromatous calcifications of the thoracic aorta.   CT Chest Hi-Resolution wo Contrast    Narrative    EXAM: CT CHEST HI-RESOLUTION WO CONTRAST  LOCATION: [facility]  DATE/TIME: 10/29/2022 2:08 AM    INDICATION: Short of breath. concern for pneumonia versus fibrosis versus edema  COMPARISON: 09/07/2022  TECHNIQUE: High resolution images were obtained through the chest during inspiration with select expiratory views. Prone imaging was performed. Multiplanar reformats were obtained. Dose reduction techniques were used.  CONTRAST: None.    FINDINGS:   LUNGS AND PLEURA: Pulmonary fibrosis with traction bronchiectasis and honeycombing again noted. Interval improvement in superimposed infiltrate seen previously. Pneumonitis in the right upper lobe not excluded. Small right effusion.    MEDIASTINUM/AXILLAE: Mediastinal adenopathy is again seen. Atherosclerotic aorta. Mitral annular calcification. Small amount of pericardial fluid similar.    CORONARY ARTERY CALCIFICATION: Severe.    UPPER ABDOMEN: Cholelithiasis.    MUSCULOSKELETAL: Degenerative change osseous structures. Prior vertebral plasty. Lower thoracic compression fractures.      Impression    IMPRESSION:   1.  Pulmonary fibrosis. Improvement in previously seen superimposed infiltrates. There is persistent interstitial thickening right upper lung which could be pneumonitis superimposed on underlying fibrosis.  2.  Small right effusion.  3.  Cholelithiasis     Medications     - MEDICATION INSTRUCTIONS -         apixaban ANTICOAGULANT  5 mg Per G Tube BID     atorvastatin  40 mg Per G Tube At Bedtime     [START ON 10/30/2022] azithromycin  250 mg Intravenous Q24H     budesonide  3 mg Oral BID     calcium  carbonate 600 mg-vitamin D 400 units  1 tablet Per G Tube BID     cefTRIAXone  2 g Intravenous Q24H     digoxin  125 mcg Oral Daily     fiber modular (NUTRISOURCE FIBER)  1 packet Per Feeding Tube Daily     gabapentin  300 mg Oral BID 09 12     gabapentin  600 mg Oral At Bedtime     mirtazapine  15 mg Per G Tube At Bedtime     multivitamin, therapeutic  1 tablet Oral Daily     potassium chloride  20 mEq Per G Tube Daily     sodium chloride (PF)  3 mL Intracatheter Q8H

## 2022-10-29 NOTE — PLAN OF CARE
Goal Outcome Evaluation:    Plan of Care Reviewed With: patient, child    Overall Patient Progress: no change    Outcome Evaluation: TF is running per home regimen. After discussion with son Efrem, I added Banatrol (in addition to Nutrisource Fiber already on the MAR) and ordered H20 flushes as at home.    Hemalatha Herbert, RD, LD, CNSC

## 2022-10-29 NOTE — PROGRESS NOTES
Pharmacy Medication History  Admission medication history interview status for the 10/29/2022  admission is complete. See EPIC admission navigator for prior to admission medications     Location of Interview: Phone  Medication history sources: Patient and Patient's family/friend (Son, Alfredo)    Significant changes made to the medication list:    Patient no longer taking aspirin 81 mg daily, instead using effervescent aspirin (camelia seltzer) for antiplatelet and phlegm control w/G tube.     Modified:    Taking Entercort EC capsules BID instead of TID. Son confirmed they are opening the capsules, crushing them, and administering via G tube.     No longer using Preservision Areds, as they are unable to be crushed and given down G tube. Instead using generic multivitamin.     In the past week, patient estimated taking medication this percent of the time: greater than 90%    Additional medication history information:     Medication reconciliation completed by provider prior to medication history? No    Time spent in this activity: 25 minutes     Prior to Admission medications    Medication Sig Last Dose Taking? Auth Provider Long Term End Date   acetaminophen (TYLENOL) 500 MG tablet 1,000 mg by Per G Tube route 3 times daily as needed 10/28/2022 Yes Reported, Patient No    apixaban ANTICOAGULANT (ELIQUIS) 5 MG tablet Take 1 tablet (5 mg) by mouth 2 times daily  Patient taking differently: 5 mg by Per G Tube route 2 times daily 10/28/2022 at 1200 Yes Caterina Wright PA-C No    atorvastatin (LIPITOR) 40 MG tablet Take 1 tablet (40 mg) by mouth daily  Patient taking differently: 40 mg by Per G Tube route At Bedtime 10/27/2022 Yes Cherise Raymond PA-C Yes    budesonide (ENTOCORT EC) 3 MG EC capsule Take 1 capsule (3 mg) by mouth 3 times daily Crushed and given hrough gastrostomy tube  Patient taking differently: Take 3 mg by mouth 2 times daily Crushed and given hrough gastrostomy tube 10/28/2022 at  1200 Yes Danny Paige MD Yes    calcium carbonate 600 mg-vitamin D 400 units (CALTRATE) 600-400 MG-UNIT per tablet 1 tablet by Per G Tube route 2 times daily 10/28/2022 at 1200 Yes Reported, Patient     digoxin (LANOXIN) 125 MCG tablet Take 1 tablet (125 mcg) by mouth daily  Patient taking differently: 125 mcg by Per G Tube route daily 10/28/2022 at 1200 Yes Cherise Raymond PA-C Yes    gabapentin (NEURONTIN) 300 MG capsule Take 300 mg AM, and 300 mg at lunch, and 600 mg at bedtime 10/28/2022 at 1200 Yes Danny Paige MD Yes    melatonin 5 MG tablet 5 mg by Per G Tube route At Bedtime 10/27/2022 Yes Reported, Patient     mirtazapine (REMERON) 15 MG tablet Take 1 tablet (15 mg) by mouth At Bedtime  Patient taking differently: 15 mg by Per G Tube route At Bedtime 10/27/2022 Yes Danny Paige MD Yes    multivitamin, therapeutic (THERA-VIT) TABS tablet Take 1 tablet by mouth daily 10/28/2022 at 1200 Yes Unknown, Entered By History     Phenylephrine-Acetaminophen 5-325 MG TABS Take 1 tablet by mouth 2 times daily Equate cold and flu 10/28/2022 Yes Unknown, Entered By History     potassium chloride (KLOR-CON) 20 MEQ packet 20 mEq by Per G Tube route daily 10/28/2022 at 1200 Yes Danny Paige MD     senna-docusate (SENOKOT-S/PERICOLACE) 8.6-50 MG tablet 2 tablets by Per G Tube route 2 times daily as needed for constipation Past Month Yes Reported, Patient     Aspirin Effervescent (WENDY-SELTZER ORIGINAL PO) Take 2 tablets by mouth 2 times daily   Reported, Patient     Banana Flakes (BANATROL PLUS) 1 packet by Per Feeding Tube route 3 times daily  Patient not taking: Reported on 10/29/2022 Not Taking  Mj Tinsley MD     diclofenac (VOLTAREN) 1 % topical gel Apply 4 g topically 4 times daily as needed for moderate pain  Patient not taking: Reported on 10/29/2022 Not Taking  Danny Paige MD     Guar Gum (FIBER MODULAR, NUTRISOURCE FIBER,) packet 1  packet by Per Feeding Tube route daily  Patient not taking: Reported on 10/29/2022 Not Taking  Mj Tinsley MD     Lidocaine (LIDOCARE) 4 % Patch Place 1 patch onto the skin every 24 hours To prevent lidocaine toxicity, patient should be patch free for 12 hrs daily. Apply to abdomen  Patient not taking: Reported on 10/29/2022 Not Taking  Reported, Patient     Multiple Vitamins-Minerals (PRESERVISION AREDS 2 PO) Take 1 chew tab by mouth 2 times daily  Patient not taking: Reported on 10/29/2022 Not Taking  Reported, Patient         The information provided in this note is only as accurate as the sources available at the time of update(s)

## 2022-10-29 NOTE — H&P
[facility]    History and Physical - Hospitalist Service       Date of Admission:  10/29/2022    Assessment & Plan      Efrem Ramos is a 77 year old male admitted on 10/29/2022. He presents to the emergency department after several days of worsening shortness of breath, cough, low oxygen readings on home pulse oximeter.  Generally hesitant to present to the emergency department, though son convinced him to do so.    Acute hypoxic respiratory failure: Suspect multifactorial.  Patient with known impaired diffusion capacity, pulmonary fibrosis, valvular heart failure, and chronic aspiration.  Patient describes shaking chills and has a significant leukocytosis which might be suggestive of aspiration pneumonia as etiology.  Chest CT improved from prior, however, and patient primarily with coughing symptoms and worsened oxygen saturations when laying flat suggesting more of acute on chronic valvular heart failure as cause of his cough, shortness of breath, and hypoxia.  As of September 2022, severe tricuspid regurgitation, mild to moderate mitral regurgitation, and severe aortic stenosis with low flow low gradient and aortic valve area of 0.69 cm   -Ceftriaxone and azithromycin for treatment of aspiration pneumonia  -Continue n.p.o. status  -Despite suspicion for valvular heart failure exacerbation, suspect we will be limited here from diuresis standpoint.  Urine is concentrated, and patient appears hypovolemic to possibly euvolemic.  Holding on diuresis currently  -Cardiology consulted for any recommendations regarding further medical management of valvular heart failure.  Note that he was not thought to be a procedural candidate secondary to his medical frailty, and cardiology has recommended hospice/palliative care in the past.  -Sputum culture and gram stain    Goals of care planning: Patient with pulmonary fibrosis, chronic aspiration, cachexia and severe protein calorie malnutrition,  severe aortic stenosis with severe mitral regurgitation.  Discussed consideration for hospice.  This has been mentioned to patient and family multiple times in the past.  Patient's general desire to have therapies available to him yet reluctance to present to the emergency department or hospital and desire not to be hospitalized is consistent with hospice.  For instance, patient was hopeful that he might be able to receive antibiotics and home oxygen and be discharged from the emergency department.  Despite general reluctance to be hospitalized, patient still would like medical treatments, such as antibiotics to be administered.  Patient is generally uncomfortable talking about his mortality.  He expresses a desire to live.  His son at bedside tells me that despite his ailments, he still finds gardenia in life.  Requires essentially 24-hour assistance from son at home, who tells me that he will continue to attempt to provide these cares for his father as long as he is able to do so.  -DO NOT RESUSCITATE, DO NOT INTUBATE.  Discussed with patient as well as son at bedside.  Note that this is a change from previously documented CODE STATUS.    -Note palliative care referral in place as an outpatient    Atrial fibrillation with rapid ventricular rate:  -Continue prior to admission apixaban anticoagulation  -Resume prior to admission digoxin when reconciled by pharmacy; digoxin level ordered and pending    Coronary artery disease: Last angiogram May 2020.  Has a history of extensive RCA stenting further in the past  -Continue prior to admission apixaban, atorvastatin    Collagenous colitis: Patient with significant issues with diarrhea after initiation on tube feeds.  Did have response to budesonide; history of biopsy-proven collagenous colitis in 2005.  -Resume prior to admission budesonide 3 mg daily when solution is available via compound pharmacy    Severe protein calorie malnutrition:  Chronic aspiration:  Stage I sacral  "pressure ulceration: Appears to have some incontinence irritation plus pressure injury.  Limited mobility at baseline with cachexia place patient at high risk for wounds.  -Nutrition consulted for tube feed resumption  -Free water flushes  -Nothing by mouth  -Wound nurse consulted  -Continue prior to admission Remeron 15 at bedtime    Obstructive sleep apnea:  -CPAP as per home regimen    MGUS: Likely contributing to acute on chronic anemia.  -Continue with outpatient oncology follow-up       Diet: Adult Formula Drip Feeding: Continuous TwoCal HN; Gastrostomy; Goal Rate: 30; mL/hr    DVT Prophylaxis: Continue prior to admission apixaban  Bravo Catheter: Not present  Central Lines: None  Cardiac Monitoring: None  Code Status:  DNR/DNI.  Discussed with patient on admission.  Note that this is a change from previously documented CODE STATUS.  Patient's son present during this conversation.    Clinically Significant Risk Factors Present on Admission         # Hyponatremia: Lowest Na = 132 mmol/L (Ref range: 136-145) in last 2 days, will monitor as appropriate       # Drug Induced Coagulation Defect: home medication list includes an anticoagulant medication      # Acute Respiratory Failure: Documented O2 saturation < 91%.  Continue supplemental oxygen as needed    # Cachexia: Estimated body mass index is 16.29 kg/m  as calculated from the following:    Height as of this encounter: 1.702 m (5' 7\").    Weight as of this encounter: 47.2 kg (104 lb).           Disposition Plan      Expected Discharge Date: 10/31/2022                The patient's care was discussed with the Patient and Patient's son at bedside, Dr. Pratt in the emergency department..    Jonathan Franks MD  Hospitalist Service  [facility]  Securely message with the Vocera Web Console (learn more here)  Text page via AFTER-MOUSE Paging/Directory         ______________________________________________________________________    Chief " Complaint   Shortness of breath, cough    History is obtained from patient, chart review, discussion with patient's son at bedside, discussion with ER provider    History of Present Illness   Efrem Ramos is a 77 year old male who presents to the emergency department with approximately 1 week of cough, shortness of breath, and low oxygen readings at home.  Patient with multifocal acute on chronic respiratory failure.  He has severe valvular heart disease with low-flow low gradient severe aortic stenosis, severe tricuspid regurgitation.  Patient also has chronic aspiration, and is tube feed dependent.  He has recurrent aspiration pneumonia versus pneumonitis.  Several admissions for similar presentations in the last year.  I actually admitted patient early September for a very similar presentation.    Patient's son has been noting low oxygen saturations on home oximeter into the 80s range.  Patient initially declined evaluation in the emergency department as he did not want to be hospitalized.  After several days of this, son was able to convince patient to be evaluated as patient expressed a desire not to die, did not want to enroll in hospice, but did not want to seek care at the hospital.  Essentially, patient was hopeful that he might be able to receive oxygen or a therapy that he could complete at home to avoid hospital stay or TCU stay.    In the emergency department, patient was not found to be hypoxic, rather oxygen saturations in the mid 90s range in triage.  After several hours in triage, he was evaluated in the emergency department where occasionally he would have some lower oxygen saturation readings.  Son reports that his lower oxygen saturation readings typically occur when he is laying flat.  Son also notes that when patient is sleeping, he will have increased coughing and rattling sounds.  When he is sitting up, his oxygen saturations improve.  Patient with frothy sputum production consistent with  heart failure.  Difficult situation given patient is hypovolemic to euvolemic and the etiology of his heart failure is valvular.  Leukocytosis greater than 20,000.  Similar presentation at the beginning of September as well.  At that time, procalcitonin was negative, though inflammatory markers were elevated.  Patient was treated with an anti-infective course, and improved.  CT in the emergency department with improvement from prior imaging, though still with abnormal lungs, so not able to definitively rule out pneumonia or infectious process.    Patient has not been taking anything by mouth.  He spits in a cup to minimize swallowing any secretions.  He is completely tube feed dependent.    History of diarrhea associated with tube feeds initiation.  Feeding tube was placed in July of this year.  Also has a more distant history of collagenous colitis.  He requires somewhat low rate of tube feeds to prevent diarrhea, though has actually been tolerating but desonide plus a low rate of 30 mL/h tube feeding.  This is to the point where patient recently had constipation.    There have been multiple discussions across multiple providers regarding patient's frailty and general decline.  Cardiology has previously recommended hospice evaluation as he is not a candidate for procedural intervention on his valvular disease.  Patient has declined hospice evaluation, and up until today, has remained full code.  I did discuss CODE STATUS with patient as noted above, and recommended DNR/DNI status.  Patient agreeable to this when I discussed that a cardiopulmonary arrest in his case would likely be related to his structural heart disease, and resuscitation would not fix the underlying issue, placing him at risk for recurrent resuscitation attempts, and likely TCU stay.  Patient is very hesitant to be placed in the TCU again.  Patient's son lives with him, provides essentially 24-hour cares as noted above.    I cannot be entirely  certain if patient has an infectious process, though my primary suspicion is this is largely an exacerbation of his acute on chronic valvular heart failure.  We will treat with anti-infectives as benefit outweighs risk care.  Patient is quite frail and high risk for decompensation.      Review of Systems    The 10 point Review of Systems is negative other than noted in the HPI or here.  Chest pain, patient reports associated with coughing.  Has been present on and off for the past several days  Constipation  Chills, some increased tremor.  Unclear if this represents rigors.    Past Medical History    I have reviewed this patient's medical history and updated it with pertinent information if needed.   Past Medical History:   Diagnosis Date     Atrial fibrillation (H)     amiodarone therapy discontinued due to pulmonary toxicity     Atrial flutter (H)      Benign essential hypertension 11/20/2018     Cancer (H) vocal cord     Carpal tunnel syndrome     abstracted 7/3/02.     Coronary artery disease involving native coronary artery of native heart without angina pectoris 05/08/2020    Cath 5/28/2020: patent stents; Cath 5/18/2020: ISABEL x4 to RCA; Cath 3/2020: 1 vessel disease; Cath 2017: moderate 2 vessel disease     CVA (cerebral infarction) 05/05/2015     Demyelinating disease of central nervous system, unspecified (H)     abstracted 7/3/02. 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     Pneumonia 08/17/2016     PVD (peripheral vascular disease) (H)      Redundant colon     needs CT colonography     Shingles      SKIN DISORDERS NEC 03/15/2005     Sleep apnea      Sleep apnea      SVT (supraventricular tachycardia) (H)        Past Surgical History   I have reviewed this patient's surgical history and updated it with pertinent information if needed.  Past Surgical History:   Procedure Laterality Date     BIOPSY  brain 2002     BONE MARROW BIOPSY, BONE SPECIMEN, NEEDLE/TROCAR N/A 6/8/2017    Procedure: BIOPSY BONE MARROW;  UNILATERAL BONE MARROW BIOPSY (CONSCIOUS SEDATION) ;  Surgeon: Jamie Gonzales MD;  Location:  GI     BONE MARROW BIOPSY, BONE SPECIMEN, NEEDLE/TROCAR N/A 2/23/2022    Procedure: BIOPSY, BONE MARROW;  Surgeon: Carmelita Aguilera MD;  Location:  GI     CARDIAC CATHERIZATION  09/05/2017    2V CAD, IFR of RCA 0.95     CV CORONARY ANGIOGRAM N/A 3/23/2020    Procedure: Coronary Angiogram and right heart cath;  Surgeon: Gino Ferrer MD;  Location:  HEART CARDIAC CATH LAB     CV HEART CATHETERIZATION WITH POSSIBLE INTERVENTION N/A 5/28/2020    Procedure: Heart Catheterization with Possible Intervention;  Surgeon: Larry Chawla MD;  Location:  HEART CARDIAC CATH LAB     CV HEART CATHETERIZATION WITH POSSIBLE INTERVENTION N/A 5/18/2020    Procedure: Heart Catheterization with Possible Intervention;  Surgeon: Gaurang Swenson MD;  Location:  HEART CARDIAC CATH LAB     CV INSTANTANEOUS WAVE-FREE RATIO N/A 3/23/2020    Procedure: Instantaneous Wave-Free Ratio;  Surgeon: Gino Ferrer MD;  Location:  HEART CARDIAC CATH LAB     CV PCI ATHERECTOMY ORBITAL N/A 5/18/2020    Procedure: Percutaneous Coronary Intervention Atherectomy Rotational;  Surgeon: Gaurang Swenson MD;  Location:  HEART CARDIAC CATH LAB     CV PCI STENT  DRUG ELUTING N/A 2020    Procedure: Percutaneous Coronary Intervention Stent Drug Eluting;  Surgeon: Gaurang Swenson MD;  Location:  HEART CARDIAC CATH LAB     CV RIGHT HEART CATH MEASUREMENTS RECORDED N/A 2020    Procedure: Right Heart Cath;  Surgeon: Larry Chawla MD;  Location:  HEART CARDIAC CATH LAB     CV TEMPORARY PACEMAKER INSERTION N/A 2020    Procedure: Temporary Pacemaker Insertion;  Surgeon: Gaurang Swenson MD;  Location:  HEART CARDIAC CATH LAB     ESOPHAGOSCOPY, GASTROSCOPY, DUODENOSCOPY (EGD), COMBINED N/A 2018    Procedure: COMBINED ESOPHAGOSCOPY, GASTROSCOPY, DUODENOSCOPY (EGD), BIOPSY SINGLE OR MULTIPLE;;  Surgeon: Xander Marie MD;  Location:  GI     HEAD & NECK SURGERY       IR GASTROSTOMY TUBE PERCUTANEOUS PLCMNT  2022     LAPAROSCOPIC ASSISTED INSERTION TUBE GASTROTOMY N/A 2022    Procedure: LAPAROSCOPIC ASSISTED GASTROSTOMY TUBE PLACEMENT;  Surgeon: Vicente Weber MD;  Location:  OR     ORTHOPEDIC SURGERY      right arm ulna reset after injury     THORACOSCOPIC WEDGE RESECTION LUNG Right 2016    Procedure: THORACOSCOPIC WEDGE RESECTION LUNG;  Surgeon: Abdelrahman Noriega MD;  Location:  OR     ZC NONSPECIFIC PROCEDURE  as a child    T & A. abstracted 7/3/02.     ZZC NONSPECIFIC PROCEDURE  early    CTR. abstracted 7/3/02.       Social History   I have reviewed this patient's social history and updated it with pertinent information if needed.  Social History     Tobacco Use     Smoking status: Former     Packs/day: 1.00     Years: 30.00     Pack years: 30.00     Types: Cigarettes     Quit date: 2013     Years since quittin.2     Smokeless tobacco: Never   Substance Use Topics     Alcohol use: Not Currently     Alcohol/week: 42.0 standard drinks     Types: 42 Standard drinks or equivalent per week     Drug use: No       Family History   I have reviewed this patient's family history and updated it with  pertinent information if needed.  Family History   Problem Relation Age of Onset     Blood Disease Mother         Anemia     Cardiovascular Father      Cancer - colorectal Maternal Grandfather      Hypertension Brother      Diabetes Sister 5        Juvinile Diabetes passed at 36     Respiratory Other         Lung Cancer       Prior to Admission Medications   Prior to Admission Medications   Prescriptions Last Dose Informant Patient Reported? Taking?   Aspirin Effervescent (WENDY-SELTZER ORIGINAL PO)   Yes No   Sig: Take 2 tablets by mouth 2 times daily   Banana Flakes (BANATROL PLUS)   No No   Si packet by Per Feeding Tube route 3 times daily   Patient taking differently: 1 packet by Per Feeding Tube route 2 times daily   Guar Gum (FIBER MODULAR, NUTRISOURCE FIBER,) packet   No No   Si packet by Per Feeding Tube route daily   Lidocaine (LIDOCARE) 4 % Patch  Nursing Home Yes No   Sig: Place 1 patch onto the skin every 24 hours To prevent lidocaine toxicity, patient should be patch free for 12 hrs daily. Apply to abdomen   Multiple Vitamins-Minerals (PRESERVISION AREDS 2 PO)   Yes No   Sig: Take 1 chew tab by mouth 2 times daily   acetaminophen (TYLENOL) 500 MG tablet  Nursing Home Yes No   Si,000 mg by Per G Tube route 3 times daily as needed   apixaban ANTICOAGULANT (ELIQUIS) 5 MG tablet  Nursing Home No No   Sig: Take 1 tablet (5 mg) by mouth 2 times daily   Patient taking differently: 5 mg by Per G Tube route 2 times daily   atorvastatin (LIPITOR) 40 MG tablet  Nursing Home No No   Sig: Take 1 tablet (40 mg) by mouth daily   Patient taking differently: 40 mg by Per G Tube route At Bedtime   budesonide (ENTOCORT EC) 3 MG EC capsule   No No   Sig: Take 1 capsule (3 mg) by mouth 3 times daily Crushed and given hrough gastrostomy tube   calcium carbonate 600 mg-vitamin D 400 units (CALTRATE) 600-400 MG-UNIT per tablet  Nursing Home Yes No   Si tablet by Per G Tube route 2 times daily   diclofenac  (VOLTAREN) 1 % topical gel  Nursing Home No No   Sig: Apply 4 g topically 4 times daily as needed for moderate pain   digoxin (LANOXIN) 125 MCG tablet  Nursing Home No No   Sig: Take 1 tablet (125 mcg) by mouth daily   Patient taking differently: 125 mcg by Per G Tube route daily   gabapentin (NEURONTIN) 300 MG capsule   No No   Sig: Take 300 mg AM, and 300 mg at lunch, and 600 mg at bedtime   guaiFENesin (ROBITUSSIN) 100 MG/5ML liquid   Yes No   Sig: Take 200 mg by mouth every 4 hours as needed   melatonin 5 MG tablet  Nursing Home Yes No   Si mg by Per G Tube route At Bedtime   mirtazapine (REMERON) 15 MG tablet  Nursing Home No No   Sig: Take 1 tablet (15 mg) by mouth At Bedtime   Patient taking differently: 15 mg by Per G Tube route At Bedtime   potassium chloride (KLOR-CON) 20 MEQ packet   No No   Si mEq by Per G Tube route daily   senna-docusate (SENOKOT-S/PERICOLACE) 8.6-50 MG tablet  Nursing Home Yes No   Si tablets by Per G Tube route 2 times daily as needed for constipation      Facility-Administered Medications: None     Allergies   Allergies   Allergen Reactions     Sulfa Drugs Difficulty breathing, Swelling and Hives     Adhesive Tape Blisters     Amiodarone Other (See Comments)     Developed pleural effusion     Latex Unknown     Cephalosporins      Tolerated ceftriaxone      Penicillins Hives     Reaction occurred as a child  Tolerated ceftriaxone in past   Other reaction(s): Hives       Physical Exam   Vital Signs: Temp: 98.5  F (36.9  C) Temp src: Oral BP: 101/73 Pulse: 99   Resp: 16 SpO2: 95 % O2 Device: None (Room air)    Weight: 104 lbs 0 oz    General Appearance: Frail and chronically ill-appearing 77-year-old male.  He does not appear toxic, though does appear generally unwell  Eyes: No scleral icterus or injection.  Orbits are sunken  HEENT: Wasting of muscles of mastication.  Oral mucous membranes moist  Respiratory: Fine Velcro-like crackles on inspiration notable throughout  lung fields, more notable in lower one half of lung fields.  No wheezing  Cardiovascular: Irregular rate and rhythm with heart rate currently in the 90s.  Wheezing systolic aortic murmur present  GI: Abdomen soft, nontender to palpation.  No palpable mass.  Lymph/Hematologic: No lower extremity edema  Genitourinary: Not examined  Skin: Patient appears to have some incontinence injury versus stage I sacral pressure ulceration.  I do not appreciate any open wound here  Musculoskeletal: Diffuse muscular wasting and subcutaneous fat loss.  Cachectic appearing  Neurologic: Alert, conversant, appropriate in conversation.  Mental status is grossly intact.  Psychiatric: Normal affect, pleasant    Data   Data reviewed today: I reviewed all medications, new labs and imaging results over the last 24 hours. I personally reviewed the chest CT image(s) showing Patchy perihilar opacities.  Some basilar prominence on left..    Recent Labs   Lab 10/28/22  1805   WBC 21.1*   HGB 8.6*   *      *   POTASSIUM 4.5   CHLORIDE 96   CO2 34*   BUN 23   CR 0.50*   ANIONGAP 2*   ULISSES 8.5   *   ALBUMIN 2.2*   PROTTOTAL 8.2   BILITOTAL 0.7   ALKPHOS 351*   *   AST 88*

## 2022-10-29 NOTE — ED NOTES
Hospitalist responded to page. Aware of BP. No further interventions at this time. Pt ok to transfer to floor. Pt arouses easily in bed.

## 2022-10-29 NOTE — ED NOTES
[facility]  ED Nurse Handoff Report    ED Chief complaint: Respiratory Problems      ED Diagnosis:   Final diagnoses:   Acute respiratory failure with hypoxia (H)   Lung fibrosis (H)   Cough, unspecified type       Code Status: Full Code    Allergies:   Allergies   Allergen Reactions     Sulfa Drugs Difficulty breathing, Swelling and Hives     Adhesive Tape Blisters     Amiodarone Other (See Comments)     Developed pleural effusion     Latex Unknown     Cephalosporins      Tolerated ceftriaxone      Penicillins Hives     Reaction occurred as a child  Tolerated ceftriaxone in past   Other reaction(s): Hives       Patient Story: Pt c/o increased SOB and low oxygen saturation. Pt not on home oxygen.   Focused Assessment:  Pt c/o Sob and oxygen saturations in the 80's on RA. Pt placed on 2lpm oxygen via NC and sats in low 90's.    Labs Ordered and Resulted from Time of ED Arrival to Time of ED Departure   BASIC METABOLIC PANEL - Abnormal       Result Value    Sodium 132 (*)     Potassium 4.5      Chloride 96      Carbon Dioxide (CO2) 34 (*)     Anion Gap 2 (*)     Urea Nitrogen 23      Creatinine 0.50 (*)     Calcium 8.5      Glucose 108 (*)     GFR Estimate >90     CBC WITH PLATELETS AND DIFFERENTIAL - Abnormal    WBC Count 21.1 (*)     RBC Count 2.63 (*)     Hemoglobin 8.6 (*)     Hematocrit 28.0 (*)      (*)     MCH 32.7      MCHC 30.7 (*)     RDW 17.7 (*)     Platelet Count 416      % Neutrophils 85      % Lymphocytes 7      % Monocytes 5      % Eosinophils 2      % Basophils 0      % Immature Granulocytes 1      NRBCs per 100 WBC 0      Absolute Neutrophils 17.7 (*)     Absolute Lymphocytes 1.5      Absolute Monocytes 1.1      Absolute Eosinophils 0.4      Absolute Basophils 0.1      Absolute Immature Granulocytes 0.3      Absolute NRBCs 0.0     NT PROBNP INPATIENT - Abnormal    N terminal Pro BNP Inpatient 4,909 (*)    ROUTINE UA WITH MICROSCOPIC REFLEX TO CULTURE - Abnormal    Color Urine  Yellow      Appearance Urine Clear      Glucose Urine Negative      Bilirubin Urine Negative      Ketones Urine Negative      Specific Gravity Urine 1.021      Blood Urine Negative      pH Urine 8.0 (*)     Protein Albumin Urine 30 (*)     Urobilinogen Urine Normal      Nitrite Urine Negative      Leukocyte Esterase Urine Negative      RBC Urine 3 (*)     WBC Urine 1     PROCALCITONIN - Normal    Procalcitonin <0.05     DIGOXIN LEVEL - Normal    Digoxin 0.9     COVID-19 VIRUS (CORONAVIRUS) BY PCR - Normal    SARS CoV2 PCR Negative     INFLUENZA A/B & SARS-COV2 PCR MULTIPLEX - Normal    Influenza A PCR Negative      Influenza B PCR Negative      RSV PCR Negative      SARS CoV2 PCR Negative     BLOOD CULTURE   BLOOD CULTURE     CT Chest Hi-Resolution wo Contrast   Final Result   IMPRESSION:    1.  Pulmonary fibrosis. Improvement in previously seen superimposed infiltrates. There is persistent interstitial thickening right upper lung which could be pneumonitis superimposed on underlying fibrosis.   2.  Small right effusion.   3.  Cholelithiasis      XR Chest 2 Views   Final Result   IMPRESSION: Right greater than left interstitial opacities are unchanged from prior examination. Superimposed patchy airspace disease at the right mid to upper lung is slightly improved, favoring residual pneumonia. Continued imaging follow-up to    resolution is recommended.      Unchanged small right pleural effusion. No pneumothorax.      Cardiomediastinal silhouette is normal. Dense calcifications of the mitral annulus. Atheromatous calcifications of the thoracic aorta.          Treatments and/or interventions provided: 2 lpm oxygen via NC  Patient's response to treatments and/or interventions: Pt resting comfortably on cot     To be done/followed up on inpatient unit:  Per admitting     Does this patient have any cognitive concerns?: AxO x4    Activity level - Baseline/Home:  Total Care  Activity Level - Current:   Total  Care    Patient's Preferred language: English   Needed?: No    Isolation: None  Infection: Not Applicable  Patient tested for COVID 19 prior to admission: YES  Bariatric?: No    Vital Signs:   Vitals:    10/29/22 0151 10/29/22 0225 10/29/22 0230 10/29/22 0400   BP:  99/68 101/73 111/71   Pulse:   99 105   Resp:  16     Temp:  98.5  F (36.9  C)     TempSrc:  Oral     SpO2: (!) 86% 93% 95% 98%   Weight:       Height:           Cardiac Rhythm:     Was the PSS-3 completed:   Yes  What interventions are required if any?               Family Comments: N/A  OBS brochure/video discussed/provided to patient/family: N/A              Name of person given brochure if not patient: n/a              Relationship to patient: n/a    For the majority of the shift this patient's behavior was Green.   Behavioral interventions performed were Care and rounding.    ED NURSE PHONE NUMBER: *[id number]

## 2022-10-29 NOTE — ED PROVIDER NOTES
History   Chief Complaint:  Respiratory Problems     HPI   Efrem Ramos is a 77 year old male with history of A-fib, hypertension, hyperlipidemia and pneumonia who presents with respiratory problems. Per son's report, the patient has had intermittent low oxygen saturation intermittently for several days. He was recently hospitalized for pneumonia in September. His oxygen saturation dips as low as 84% on his home pulse oximeter when he is laying down. Sometimes in high 70s. He notes accompanying chills. Due to these symptoms he was advised to present here by his home nurse two days ago but he would not do come in. The patient has been uninterested in coming in, but his son convinced him to present here today. He denies any new cough, fever, leg swelling, chest pain, diarrhea or dysuria. Recently, the patient began taking Senna for constipation. He stopped taking this medication yesterday. Of note, the patient is currently taking Eliquis.     Review of Systems   Constitutional: Positive for chills. Negative for fever.   Cardiovascular: Negative for chest pain and leg swelling.   Gastrointestinal: Negative for diarrhea.   Genitourinary: Negative for dysuria.   All other systems reviewed and are negative.    Allergies:  Sulfa Drugs  Adhesive Tape  Amiodarone  Latex  Cephalosporins  Penicillins    Medications:  Eliquis  Lipitor  Budesonide  Lanoxin  Neurontin  Robitussin  Melatonin  Remeron  Potassium chloride  Senna-docusate     Past Medical History:    A-fib  A-flutter  Benign essential hypertension  Cancer  Carpal tunnel syndrome  CAD  CVA  Demyelinating disease of central nervous system  Dyspnea  Encephalopathy  Femoral artery hematoma complicating cardiac catheterization  Thrombophlebitis  Mitral valve problem  Hyperlipidemia  Nonrheumatic mitral valve stenosis  MEL  Pneumonia   PVD  Redundant colon  Shingles  SVT  Vocal cord cancer  Noninfectious gastroenteritis  Collagenous colitis  Vitamin B12 deficiency  "disease  Heteronymous bilateral field defects  Alcohol abuse  Physical deconditioning  Acute respiratory failure   Cardiogenic shock  Severe aortic stenosis  Aspiration pneumonia of right lower lobe    Past Surgical History:    Bone marrow biopsy x2  Brain biopsy  Tonsillectomy & Adenoidectomy   CTR   Thoracoscopic wedge resection lung, right  Right arm, ulna reset  Laparoscopic assisted insertion tube gastrotomy  Head and neck surgery  EGD, combined  Temporary pacemaker insertion  PCI Stent drug eluting  PCI atherectomy orbital  Heart catheterization x2  Coronary angiogram  Percutaneous endoscopic gastrostomy tube placement     Family History:    Mother: Anemia  Father: Cardiovascular  Brother: Hypertension  Sister: Diabetes    Social History:  The patient was accompanied to the ED by his son.    Physical Exam     Patient Vitals for the past 24 hrs:   BP Temp Temp src Pulse Resp SpO2 Height Weight   10/29/22 0230 101/73 -- -- 99 -- 95 % -- --   10/29/22 0225 99/68 98.5  F (36.9  C) Oral -- 16 93 % -- --   10/29/22 0151 -- -- -- -- -- (!) 86 % -- --   10/29/22 0148 -- -- -- -- -- (!) 84 % -- --   10/28/22 1753 105/68 97.7  F (36.5  C) Temporal 94 20 95 % 1.702 m (5' 7\") 47.2 kg (104 lb)       Physical Exam  General: Sitting up in bed  Eyes:  The pupils are equal and round    Conjunctivae and sclerae are normal  ENT:    Wearing a mask  Neck:  Normal range of motion  CV:  Irregular rate and rhythm    Skin warm and well perfused   Resp:  Non labored breathing on room air    No tachypnea    Coarse breath sounds on bases    Productive sounding cough heard  GI:  Abdomen is soft, there is no rigidity    No distension    No rebound tenderness     No abdominal tenderness    G tube in place on LUQ  MS:  Normal muscular tone  Skin:  No rash or acute skin lesions noted  Neuro:   Awake, alert.      Speech is normal and fluent.    Face is symmetric.     Moves all extremities equally  Psych: Normal affect.  Appropriate " interactions.    Emergency Department Course     Imaging:  CT Chest Hi-Resolution wo Contrast   Final Result   IMPRESSION:    1.  Pulmonary fibrosis. Improvement in previously seen superimposed infiltrates. There is persistent interstitial thickening right upper lung which could be pneumonitis superimposed on underlying fibrosis.   2.  Small right effusion.   3.  Cholelithiasis      XR Chest 2 Views   Final Result   IMPRESSION: Right greater than left interstitial opacities are unchanged from prior examination. Superimposed patchy airspace disease at the right mid to upper lung is slightly improved, favoring residual pneumonia. Continued imaging follow-up to    resolution is recommended.      Unchanged small right pleural effusion. No pneumothorax.      Cardiomediastinal silhouette is normal. Dense calcifications of the mitral annulus. Atheromatous calcifications of the thoracic aorta.          Laboratory:  Labs Ordered and Resulted from Time of ED Arrival to Time of ED Departure   BASIC METABOLIC PANEL - Abnormal       Result Value    Sodium 132 (*)     Potassium 4.5      Chloride 96      Carbon Dioxide (CO2) 34 (*)     Anion Gap 2 (*)     Urea Nitrogen 23      Creatinine 0.50 (*)     Calcium 8.5      Glucose 108 (*)     GFR Estimate >90     CBC WITH PLATELETS AND DIFFERENTIAL - Abnormal    WBC Count 21.1 (*)     RBC Count 2.63 (*)     Hemoglobin 8.6 (*)     Hematocrit 28.0 (*)      (*)     MCH 32.7      MCHC 30.7 (*)     RDW 17.7 (*)     Platelet Count 416      % Neutrophils 85      % Lymphocytes 7      % Monocytes 5      % Eosinophils 2      % Basophils 0      % Immature Granulocytes 1      NRBCs per 100 WBC 0      Absolute Neutrophils 17.7 (*)     Absolute Lymphocytes 1.5      Absolute Monocytes 1.1      Absolute Eosinophils 0.4      Absolute Basophils 0.1      Absolute Immature Granulocytes 0.3      Absolute NRBCs 0.0     NT PROBNP INPATIENT - Abnormal    N terminal Pro BNP Inpatient 4,909 (*)     PROCALCITONIN - Normal    Procalcitonin <0.05     DIGOXIN LEVEL - Normal    Digoxin 0.9     COVID-19 VIRUS (CORONAVIRUS) BY PCR - Normal    SARS CoV2 PCR Negative     ROUTINE UA WITH MICROSCOPIC REFLEX TO CULTURE   BLOOD CULTURE   BLOOD CULTURE        Emergency Department Course:     Reviewed:  I reviewed nursing notes, vitals, past medical history and care everywhere    Assessments:  0130 I obtained history and examined the patient as noted above.      I rechecked and updated the patient regarding the imaging results, laboratory results and the plan for care.    Consults:   0317 I spoke with the hospitalist, Dr. Franks, regarding placement for the patient.  Disposition:  The patient was admitted to the hospital under the care of Dr. Franks.     Impression & Plan   Medical Decision Making:  Efrem Ramos is a 77-year-old male who presented to the emergency department with respiratory problems.  Patient has been having hypoxia at home.  It has been intermittent.  He was intermittently hypoxic here in the emergency department all the way down to mid 80s requiring oxygen.  He has known fibrosis as well as valvular disease.  His white blood cell count is elevated above what it was on discharge 1 month ago.  Chest x-ray shows infiltrates though difficult to tell how much is new.  CT chest done that showed his fibrosis as well as possible findings of pneumonitis. patient likely aspirates chronically. his procalcitonin was normal.  Doubt PE given that he is on anticoagulation.  Troponin was normal.  EKG shows atrial fibrillation.  Given his hypoxia, will bring him into the hospital which she was agreeable with.  Discussed the patient with hospitalist and he will order antibiotics.    Diagnosis:    ICD-10-CM    1. Acute respiratory failure with hypoxia (H)  J96.01       2. Lung fibrosis (H)  J84.10       3. Cough, unspecified type  R05.9           Scribe Disclosure:  Gerardo GREEN, am serving as a scribe at 1:04 AM on  10/29/2022 to document services personally performed by Carrol Pratt MD based on my observations and the provider's statements to me.      Carrol Pratt MD  10/29/22 0549

## 2022-10-30 NOTE — PROGRESS NOTES
[facility]  Patient is currently open to home care services with [facility]. The patient is currently receiving RN PT OT services.  and home health team have been notified of patient admission. [facility] liaison will continue to follow patient during stay. If appropriate provide orders to resume home care at time of discharge.    Cherie Rosenthal RN   [facility] Liaison   [phone]

## 2022-10-30 NOTE — PLAN OF CARE
Goal Outcome Evaluation:     Plan of Care Reviewed With: patient    Overall Patient Progress: no change    A&Ox4. VSS on 1L NC. Denies pain. LS dim, slight SOB at times; HIGH. TF infusing 30 mL/hr, dressing CDI. Nonblanchable coccyx, mepilex CDI. NPO. Not OOB, but would be A2, PT to assess. Voiding in urinal.

## 2022-10-30 NOTE — PLAN OF CARE
Goal Outcome Evaluation:  [id number], 10/30/2022  A&Ox4. VSS on 1L NC. Reports 5/10 midsternal chest pain w/ cough, declines pain meds. LS dim, slight SOB at times. Tele A-fib.  Tolerating NPO. TF infusing 30 mL/hr, dressing CDI. A2 turn & repo. Nonblanchable coccyx, mepilex CDI. Voiding in urinal. Plan: continue current care. Discharge pending improvement.

## 2022-10-30 NOTE — PROGRESS NOTES
Patient has been assessed for Home Oxygen needs. Oxygen readings:    *Pulse oximetry (SpO2) = 86% on room air at rest while awake.    *SpO2 improved to 93% on 1 liters/minute at rest.    *SpO2 = 87% on room air during activity/with exercise.    *SpO2 improved to 97% on 2 liters/minute during activity/with exercise.

## 2022-10-30 NOTE — PLAN OF CARE
Goal Outcome Evaluation:      Plan of Care Reviewed With: patient    Overall Patient Progress: improvingOverall Patient Progress: improving     Pt is A&Ox4. VSS. Oxygen sat maintained at 93-97 on 1L of O2. Complains of intermittent chest/back pain that worsens with cough, Pt declined intervention. Is on tele and shows A-fib with CVR. Tube feeding to G-tube at 30mL/hr. Medications given through G-tube. Strict NPO no exceptions. Nonblanchable redness on coccyx covered with Mepliex is CDI. Pt would like to know more about hospice care. Care coordinator ordered. Continue to monitor.

## 2022-10-30 NOTE — PROGRESS NOTES
Oxygen Documentation:   I certify that this patient, Efrem Ramos has been under my care (or a nurse practitioner or physician's assistant working with me). This is the face-to-face encounter for oxygen medical necessity.      Efrem Ramos is now in a chronic stable state and continues to require supplemental oxygen. Patient has continued oxygen desaturation due to ILD J84.9.    Alternative treatment(s) tried or considered and deemed clinically infective for treatment of ILD J84.9 include pulmonary toileting and pulmonary rehab.  If portability is ordered, is the patient mobile within the home? no    **Patients who qualify for home O2 coverage under the CMS guidelines require ABG tests or O2 sat readings obtained closest to, but no earlier than 2 days prior to the discharge, as evidence of the need for home oxygen therapy. Testing must be performed while patient is in the chronic stable state. See notes for O2 sats.**    I personally performed O2 testing during exam on 10/30. Patient desaturated to 87% on room air. Improved to 94-99% on 2L by nasal cannula.    Dominic Hogan MD

## 2022-10-30 NOTE — PROGRESS NOTES
[facility]    Medicine Progress Note - Hospitalist Service    Date of Admission:  10/29/2022    Assessment & Plan      Efrem Ramos is a 77 year old male admitted on 10/29/2022. He presents to the emergency department after several days of worsening shortness of breath, cough, low oxygen readings on home pulse oximeter.  Generally hesitant to present to the emergency department, though son convinced him to do so.     Acute hypoxic respiratory failure  Possible aspiration pneumonia  Acute on chronic severe valvular heart disease  Severe TR  Severe aortic stenosis  * Suspect respiratory issues are multifactorial.  Patient with known impaired diffusion capacity, pulmonary fibrosis, valvular heart failure, and chronic aspiration.  Patient describes shaking chills and has a significant leukocytosis which might be suggestive of aspiration pneumonia as etiology.  Chest CT improved from prior, however, and patient primarily with coughing symptoms and worsened oxygen saturations when laying flat suggesting more of acute on chronic valvular heart failure as cause of his cough, shortness of breath, and hypoxia. Additionally, he is expectorating frothy white output consistent with cardiac etiology.  As of September 2022, severe tricuspid regurgitation, mild to moderate mitral regurgitation, and severe aortic stenosis with low flow low gradient and aortic valve area of 0.69 cm   -Ceftriaxone and azithromycin for treatment of suspected aspiration pneumonia. Suspect that he does not actually have pneumonia, but treating about of abundance of caution.  -Continue n.p.o. status  -Despite suspicion for valvular heart failure exacerbation, suspect we will be limited here from diuresis standpoint.  Urine is concentrated, and patient appears hypovolemic to possibly euvolemic.  Holding on diuresis currently  -Cardiology consulted, please see note 10/29. In short, they can offer no intervention or  optimization  -Sputum culture and gram stain  - will need O2 at discharge, home O2 order placed 10/30    Goals of care planning: Patient with pulmonary fibrosis, chronic aspiration, cachexia and severe protein calorie malnutrition, severe aortic stenosis with severe mitral regurgitation.  Discussed consideration for hospice.  This has been mentioned to patient and family multiple times in the past.  Patient's general desire to have therapies available to him yet reluctance to present to the emergency department or hospital and desire not to be hospitalized is consistent with hospice.  For instance, patient was hopeful that he might be able to receive antibiotics and home oxygen and be discharged from the emergency department.  Despite general reluctance to be hospitalized, patient still would like medical treatments, such as antibiotics to be administered.  Patient is generally uncomfortable talking about his mortality.  He expresses a desire to live.  His son at bedside tells me that despite his ailments, he still finds gardenia in life.  Requires essentially 24-hour assistance from son at home, who tells me that he will continue to attempt to provide these cares for his father as long as he is able to do so.  -DO NOT RESUSCITATE, DO NOT INTUBATE.  Discussed with patient as well as son at bedside.  Note that this is a change from previously documented CODE STATUS.    -Note palliative care referral in place as an outpatient  -ongoing discussion about goals of care.    Atrial fibrillation with rapid ventricular rate  -Continue prior to admission apixaban anticoagulation  -Resume prior to admission digoxin   -Digoxin level 1.0 10/29    Coronary artery disease  Last angiogram May 2020.  Has a history of extensive RCA stenting further in the past  -Continue prior to admission apixaban, atorvastatin    Collagenous colitis  * Patient with significant issues with diarrhea after initiation on tube feeds.  Did have response to  budesonide; history of biopsy-proven collagenous colitis in 2005.  -Resume prior to admission budesonide 3 mg daily when solution is available via compound pharmacy    Severe protein calorie malnutrition:  Chronic aspiration:  Stage I sacral pressure ulceration: Appears to have some incontinence irritation plus pressure injury.  Limited mobility at baseline with cachexia place patient at high risk for wounds.  -Nutrition consulted for tube feed resumption  -Free water flushes  -Nothing by mouth  -Wound nurse consulted  -Continue prior to admission Remeron 15 at bedtime    Obstructive sleep apnea:  -CPAP as per home regimen    MGUS  Acute on chronic anemia.  -Continue with outpatient oncology follow-up         Diet: NPO for Medical/Clinical Reasons Except for: No Exceptions  Adult Formula Drip Feeding: Continuous TwoCal HN; Gastrostomy; Goal Rate: 30; mL/hr    DVT Prophylaxis: DOAC  Bravo Catheter: Not present  Central Lines: None  Cardiac Monitoring: ACTIVE order. Indication: Acute decompensated heart failure (48 hours)  Code Status: No CPR- Do NOT Intubate      Disposition Plan     Expected Discharge Date: 10/31/2022                The patient's care was discussed with the Bedside Nurse and Patient.    Dominic Hogan MD  Hospitalist Service  [facility]  Securely message with the Vocera Web Console (learn more here)  Text page via [employer] Paging/Directory       Clinically Significant Risk Factors         # Hyponatremia: Lowest Na = 132 mmol/L (Ref range: 136-145) in last 2 days, will monitor as appropriate   # Hypercalcemia: corrected calcium is >10.1, will monitor as appropriate    # Hypoalbuminemia: Lowest albumin = 1.8 g/dL (Ref range: 3.5-5.2) at 10/30/2022  6:38 AM, will monitor as appropriate            # Severe Malnutrition: based on nutrition assessment, PRESENT ON ADMISSION     Time Spent on this Encounter   I spent 40 minutes on the unit/floor managing the care of Efrem DUMONT  Richard. Over 50% of my time was spent on the following:   - Counseling the patient and/or family regarding: diagnostic results, prognosis and risks and benefits of treatment options  - Coordination of care with the: nurse    Discussion as outlined below in interval history. Significant goals of care    Dominic Hogan MD      ______________________________________________________________________    Interval History   Stable from the day prior until now. No change in cough. Some rib and chest pain with coughing. No f/c/r. No change in the white froth he is coughing up.     Discussed again at length with patient and son about goals of care today (15min with patient and 35min by phone with son). Patient and son both state their desire is for Alfredo to die at home. Expressed that with current symptoms and care plan, I worry that his symptoms will not be controlled in the dying process. Expect that he will have worsening SOB, cough, chest pain associated with coughing, ongoing back pain, work of breathing, etc as his untreatable disease progresses.  Discussed that death is inevitable and it is a blessing that he can have some control over the location and manner of his death. Discussed that hospice could be an important tool in achieving death at home in a good manner. Son shared some reticence about having additional people in the home as it is quite crowded. He reports patient has a clean safe area that he stays in.    Data reviewed today: I reviewed all medications, new labs and imaging results over the last 24 hours. I personally reviewed no images or EKG's today.    Physical Exam   Vital Signs: Temp: 98.4  F (36.9  C) Temp src: Oral BP: 100/65 Pulse: 97   Resp: 16 SpO2: 97 % O2 Device: Nasal cannula Oxygen Delivery: 2 LPM  Weight: 116 lbs 9.97 oz  Constitutional: Awake, alert, cooperative, no apparent distress  Respiratory: fine crackles throughout. No clear wheezing or rhonchi, no incresed WOB> on  NC.  Cardiovascular: IRR, sys murmur  GI: Normal bowel sounds, soft, non-distended, non-tender  Skin/Integumen: No rashes, no cyanosis, no edema  Other: Cachetic, frail, older than stated age      Data   Recent Labs   Lab 10/30/22  0638 10/28/22  1805   WBC 15.3* 21.1*   HGB 7.1* 8.6*   * 107*    416    132*   POTASSIUM 3.7 4.5   CHLORIDE 102 96   CO2 29 34*   BUN 18 23   CR 0.50* 0.50*   ANIONGAP 5 2*   ULISSES 7.8* 8.5   * 108*   ALBUMIN 1.8* 2.2*   PROTTOTAL 6.9 8.2   BILITOTAL 0.3 0.7   ALKPHOS 251* 351*   ALT 68 123*   AST 37 88*     No results found for this or any previous visit (from the past 24 hour(s)).  Medications     - MEDICATION INSTRUCTIONS -         apixaban ANTICOAGULANT  5 mg Per G Tube BID     atorvastatin  40 mg Per G Tube At Bedtime     azithromycin  250 mg Intravenous Q24H     budesonide  3 mg Oral BID     calcium carbonate  1,500 mg Per G Tube BID w/meals     cefTRIAXone  2 g Intravenous Q24H     digoxin  125 mcg Oral or G tube Daily     fiber modular  1 packet Per Feeding Tube TID     fiber modular (NUTRISOURCE FIBER)  1 packet Per Feeding Tube Daily     gabapentin  300 mg Oral or G tube BID     gabapentin  600 mg Oral At Bedtime     mirtazapine  15 mg Per G Tube At Bedtime     multivitamins w/minerals  15 mL Per G Tube Daily     potassium chloride  20 mEq Per G Tube Daily     sodium chloride (PF)  3 mL Intracatheter Q8H

## 2022-10-31 NOTE — PLAN OF CARE
Goal Outcome Evaluation:    October 31, 2022, [id number]    A&Ox4. VSS on 1L nasal cannula. States brenda, managed with PRN tylenol x1. A/2 GB/ W, out of bed to the commode 1x this shift, small BM. Voiding in urinal at bedside with adequate UP.  Mepilex changed on coccyx, pink and blanchable, dressing CDI. T&R Q2 hours, Pulsair mattress ordered and implemented. Palliative consult ordered. Discharge expected 11/1 to home with son pending improvement.

## 2022-10-31 NOTE — PROGRESS NOTES
[facility]    Medicine Progress Note - Hospitalist Service    Date of Admission:  10/29/2022    Assessment & Plan   Efrem Ramos is a 77 year old male admitted on 10/29/2022. He presents to the emergency department after several days of worsening shortness of breath, cough, low oxygen readings on home pulse oximeter.  Generally hesitant to present to the emergency department, though son convinced him to do so.      Acute hypoxic respiratory failure  Possible aspiration pneumonia  Acute on chronic severe valvular heart disease  Severe TR  Severe aortic stenosis  * Suspect respiratory issues are multifactorial.  Patient with known impaired diffusion capacity, pulmonary fibrosis, valvular heart failure, and chronic aspiration.  Patient describes shaking chills and has a significant leukocytosis which might be suggestive of aspiration pneumonia as etiology.  Chest CT improved from prior, however, and patient primarily with coughing symptoms and worsened oxygen saturations when laying flat suggesting more of acute on chronic valvular heart failure as cause of his cough, shortness of breath, and hypoxia. Additionally, he is expectorating frothy white output consistent with cardiac etiology.  As of September 2022, severe tricuspid regurgitation, mild to moderate mitral regurgitation, and severe aortic stenosis with low flow low gradient and aortic valve area of 0.69 cm   - Ceftriaxone and azithromycin for treatment of suspected aspiration pneumonia. Suspect that he does not actually have pneumonia, but treating about of abundance of caution.  - Continue n.p.o. status  - Despite suspicion for valvular heart failure exacerbation, suspect we will be limited here from diuresis standpoint.  Urine is concentrated, and patient appears hypovolemic to possibly euvolemic.  Holding on diuresis currently  - Cardiology consulted, please see note 10/29. In short, they can offer no intervention or  optimization  - Sputum culture and gram stain  - Will likely need home O2 at discharge depending on palliative discussion.  Home O2 evaluation done on 10/30.  May need repeat if still here      Goals of care planning  Patient with pulmonary fibrosis, chronic aspiration, cachexia and severe protein calorie malnutrition, severe aortic stenosis with severe mitral regurgitation.  Discussed consideration for hospice.  This has been mentioned to patient and family multiple times in the past.  Patient's general desire to have therapies available to him yet reluctance to present to the emergency department or hospital and desire not to be hospitalized is consistent with hospice.  For instance, patient was hopeful that he might be able to receive antibiotics and home oxygen and be discharged from the emergency department.  Despite general reluctance to be hospitalized, patient still would like medical treatments, such as antibiotics to be administered.  Patient is generally uncomfortable talking about his mortality.  He expresses a desire to live.  His son at bedside tells me that despite his ailments, he still finds gardenia in life.  Requires essentially 24-hour assistance from son at home, who tells me that he will continue to attempt to provide these cares for his father as long as he is able to do so.  - DO NOT RESUSCITATE, DO NOT INTUBATE.  Discussed with patient as well as son at bedside.  Note that this is a change from previously documented CODE STATUS  - Palliative care consulted for ongoing discussion about goals of care     Atrial fibrillation with rapid ventricular rate  Digoxin level 1.0 10/29  - Continue prior to admission apixaban anticoagulation  - Resume prior to admission digoxin      Coronary artery disease  Last angiogram May 2020.  Has a history of extensive RCA stenting further in the past  - Continue prior to admission apixaban, atorvastatin     Collagenous colitis  * Patient with significant issues with  diarrhea after initiation on tube feeds.  Did have response to budesonide; history of biopsy-proven collagenous colitis in 2005.  - PTA budesonide 3 mg daily when solution is available via compound pharmacy     Severe protein calorie malnutrition  Chronic aspiration  Stage I sacral pressure ulceration  Appears to have some incontinence irritation plus pressure injury.  Limited mobility at baseline with cachexia place patient at high risk for wounds.  - Nutrition consulted for tube feed resumption  - Free water flushes  - Wound nurse consulted  - Continue prior to admission Remeron 15 at bedtime     Obstructive sleep apnea  - CPAP as per home regimen     MGUS  Acute on chronic anemia  - Continue with outpatient oncology follow-up       Diet: NPO for Medical/Clinical Reasons Except for: No Exceptions  Adult Formula Drip Feeding: Continuous TwoCal HN; Gastrostomy; Goal Rate: 30; mL/hr; Do not advance tube feeding rate unless K+ is = or > 3.0, Mg++ is = or > 1.5, and Phos is = or > 1.9    DVT Prophylaxis: DOAC  Bravo Catheter: Not present  Central Lines: None  Cardiac Monitoring: ACTIVE order. Indication: Acute decompensated heart failure (48 hours)  Code Status: No CPR- Do NOT Intubate      Disposition Plan     Expected Discharge Date: 10/31/2022                The patient's care was discussed with the Bedside Nurse, Care Coordinator/ and Patient.    Francois Rucker,   Hospitalist Service  [facility]  Securely message with the Vocera Web Console (learn more here)  Text page via [employer] Paging/Directory         Clinically Significant Risk Factors              # Hypoalbuminemia: Lowest albumin = 1.8 g/dL (Ref range: 3.5-5.2) at 10/30/2022  6:38 AM, will monitor as appropriate           # Severe Malnutrition: based on nutrition assessment, PRESENT ON ADMISSION       ______________________________________________________________________    Interval History   Patient seen and  examined.  No acute events over night.  No pain at this time.  No worsening of his chronic SOB.  No fevers noted.  Tolerating diet without nausea or vomiting.     Data reviewed today: I reviewed all medications, new labs and imaging results over the last 24 hours. I personally reviewed no images or EKG's today.    Physical Exam   Vital Signs: Temp: 98.6  F (37  C) Temp src: Oral BP: 94/62 Pulse: 94   Resp: 16 SpO2: 98 % O2 Device: Nasal cannula Oxygen Delivery: 1 LPM  Weight: 115 lbs 15.39 oz  General Appearance: Frail appearing.  Resting comfortably. NAD  Respiratory: Clear to auscultation.  No respiratory distress  Cardiovascular: RRR.  No obvious murmurs  GI: Soft.  Non-distended   Skin: No obvious rashes or cyanosis to exposed skin  Other: Alert.  Moving all extremities grossly     Data   Recent Labs   Lab 10/31/22  0840 10/30/22  0638 10/28/22  1805   WBC 15.8* 15.3* 21.1*   HGB 7.5* 7.1* 8.6*   * 106* 107*    368 416    136 132*   POTASSIUM 3.7 3.7 4.5   CHLORIDE 103 102 96   CO2 30 29 34*   BUN 16 18 23   CR 0.48* 0.50* 0.50*   ANIONGAP 3 5 2*   ULISSES 7.9* 7.8* 8.5   * 102* 108*   ALBUMIN  --  1.8* 2.2*   PROTTOTAL  --  6.9 8.2   BILITOTAL  --  0.3 0.7   ALKPHOS  --  251* 351*   ALT  --  68 123*   AST  --  37 88*     No results found for this or any previous visit (from the past 24 hour(s)).

## 2022-10-31 NOTE — CONSULTS
[facility]  WOC Nurse Inpatient Assessment     Consulted for: coccyx     Patient History (according to provider note(s):      Efrem Ramos is a 77 year old male admitted on 10/29/2022. He presents to the emergency department after several days of worsening shortness of breath, cough, low oxygen readings on home pulse oximeter.  Generally hesitant to present to the emergency department, though son convinced him to do so.    Areas Assessed:      Areas visualized during today's visit: Focused: and Sacrum/coccyx    No wounds to be found. Skin intact     Treatment Plan:     Continue with sacral mepilex prevention protocol and IAD protocol - patient up to bedside commode.      Orders: Written    RECOMMEND PRIMARY TEAM ORDER: None, at this time  Education provided: plan of care and Off-loading pressure  Discussed plan of care with: Patient and Nurse  WOC nurse follow-up plan: signing off  Notify WOC if wound(s) deteriorate.  Nursing to notify the Provider(s) and re-consult the WOC Nurse if new skin concern.    DATA:     Current support surface: Standard  pulsate mattress already ordered today   Containment of urine/stool: Incontinence Protocol  BMI: Body mass index is 18.16 kg/m .   Active diet order: Orders Placed This Encounter      NPO for Medical/Clinical Reasons Except for: No Exceptions     Output: I/O last 3 completed shifts:  In: -   Out: 450 [Urine:450]     Labs: Recent Labs   Lab 10/31/22  0840 10/30/22  0638   ALBUMIN  --  1.8*   HGB 7.5* 7.1*   WBC 15.8* 15.3*     Pressure injury risk assessment:   Sensory Perception: 3-->slightly limited  Moisture: 3-->occasionally moist  Activity: 3-->walks occasionally  Mobility: 3-->slightly limited  Nutrition: 2-->probably inadequate  Friction and Shear: 2-->potential problem  Marv Score: 16    Gretta Whatley RN BS CWOCN

## 2022-10-31 NOTE — PROGRESS NOTES
[facility]    Patient is currently on service with [facility] for home TF (TwoCal HN 2.0, 3 cans daily). [facility] provides home RN PT OT services.     Liaison will follow along. If applicable at discharge, please include Home Infusion Referral in discharge orders.     Thank you,    Do Concepcion RN  [facility] Liaison  [phone] (M-F 8a-5p)  [phone] Office

## 2022-10-31 NOTE — TELEPHONE ENCOUNTER
Per chart review, patient currently admitted to [facility].     Signing encounter.    Mita Siu RN  [facility]

## 2022-10-31 NOTE — PLAN OF CARE
Goal Outcome Evaluation:  [id number], 10/31/2022  A&Ox4. VSS on 1L NC. Reports 5/10 chest pain w/ cough, declines pain meds. LS dim, slight SOB at times. Tolerating NPO. TF infusing 30 mL/hr, dressing CDI. A2 turn & repo. Nonblanchable coccyx, mepilex CDI. Voiding in urinal. Plan: continue current care. Discharge pending improvement.

## 2022-11-01 NOTE — PROGRESS NOTES
11/01/22 1510   Appointment Info   Signing Clinician's Name / Credentials (PT) Jennifer Salas, PT, DPT   Living Environment   People in Home child(олег), adult   Current Living Arrangements house   Home Accessibility stairs to enter home;stairs within home   Number of Stairs, Main Entrance 3   Stair Railings, Main Entrance railing on right side (ascending)   Number of Stairs, Within Home, Primary greater than 10 stairs   Stair Railings, Within Home, Primary railing on right side (ascending)   Transportation Anticipated family or friend will provide   Living Environment Comments pt lives in a home with adult son is staying with him, he does have 3 stairs to enter and a full flight of stairs to his bedroom, pt reports son helps to get into first 3 stairs and then he bumps himself up on his bottom up the full flight of stairs where his bedroom is.   Self-Care   Usual Activity Tolerance moderate   Current Activity Tolerance poor   Equipment Currently Used at Home walker, rolling;wheelchair, manual;hospital bed   Fall history within last six months   (pt reports no falls, per chart 4 has been previously documented.)   Activity/Exercise/Self-Care Comment pt reports he was able to get dressed by himself, uses walker or furniture for UE support within home for ambulation. Pt's son pushes pt in WC from bottom of stairs to car in garage and in the community.   General Information   Onset of Illness/Injury or Date of Surgery 10/29/22   Referring Physician Franks, Jonathan Arango MD   Patient/Family Therapy Goals Statement (PT) Planning on going home when able.   Pertinent History of Current Problem (include personal factors and/or comorbidities that impact the POC) Pt is a 77 year old male with PMHx of CAD with hx of stenting, hypertension, afib/flutter, CVA, collagenous colitis, MEL, and MGUS among other medical problems who was admitted on 10/29/2022 with acute hypoxic respiratory failure. Palliative consult has been deferred to  OP at this time.   Existing Precautions/Restrictions fall;NPO;oxygen therapy device and L/min;other (see comments)  (1 LPM O2. DNR/DNI)   Cognition   Affect/Mental Status (Cognition) WFL   Orientation Status (Cognition) oriented x 3   Follows Commands (Cognition) WFL   Pain Assessment   Patient Currently in Pain No   Integumentary/Edema   Integumentary/Edema Comments tube feeding placement intact.   Posture    Posture Kyphosis;Protracted shoulders;Forward head position   Range of Motion (ROM)   ROM Comment Grossly WFL BUE and LE.   Strength (Manual Muscle Testing)   Strength (Manual Muscle Testing) Able to perform L SLR;Able to perform R SLR   Strength Comments grossly antigravity strength BUE and LE. Global weakness.   Bed Mobility   Comment, (Bed Mobility) Supine HOB approx 40 deg>sitting EOB, use of bed rail, CGA.   Transfers   Comment, (Transfers) Sit>stand to FWW, Malinda, elevated bed height.   Gait/Stairs (Locomotion)   Distance in Feet (Required for LE Total Joints) 5' eval +15' treatment   Distance in Feet (Gait) 15'   Comment, (Gait/Stairs) Amb with FWW, Malinda, decreased renetta, no LOB, decreased step length.   Balance   Balance Comments able to maintain seated balance BUE support. Able to maintain standing balance in FWW, CGA.   Sensory Examination   Sensory Perception Comments Pt reports some tingling in hands at baseline.   Clinical Impression   Criteria for Skilled Therapeutic Intervention Yes, treatment indicated   PT Diagnosis (PT) Impaired gait   Influenced by the following impairments decreased functional strength, decreased activity tolerance, impaired balance and mobility   Functional limitations due to impairments fall risk, impaired functional independence, impaired ADLs and IADLs   Clinical Presentation (PT Evaluation Complexity) Stable/Uncomplicated   Clinical Presentation Rationale per MR and clinical judgement   Clinical Decision Making (Complexity) low complexity   Planned Therapy  Interventions (PT) balance training;bed mobility training;gait training;home exercise program;patient/family education;ROM (range of motion);stair training;strengthening;stretching;transfer training;progressive activity/exercise   Risk & Benefits of therapy have been explained evaluation/treatment results reviewed;care plan/treatment goals reviewed;risks/benefits reviewed;current/potential barriers reviewed;participants voiced agreement with care plan;participants included;patient   PT Total Evaluation Time   PT Eval, Low Complexity Minutes (16579) 10   Physical Therapy Goals   PT Frequency Daily   PT Predicted Duration/Target Date for Goal Attainment 11/08/22   PT Goals Bed Mobility;Transfers;Gait;Stairs   PT: Bed Mobility Supervision/stand-by assist;Supine to/from sit;Rolling  (hospital bed at home.)   PT: Transfers Sit to/from stand;Bed to/from chair;Assistive device;Supervision/stand-by assist   PT: Gait Supervision/stand-by assist;Assistive device;Rolling walker;100 feet   PT: Stairs Moderate assist;3 stairs;Rail on right   Interventions   Interventions Quick Adds Gait Training;Therapeutic Activity;Therapeutic Procedure   Therapeutic Procedure/Exercise   Ther. Procedure: strength, endurance, ROM, flexibillity Minutes (30040) 10   Symptoms Noted During/After Treatment fatigue   Treatment Detail/Skilled Intervention pt completed the following exercises for functional strength and ROM benefits. Pt completed set of 10 reps bilaterally LAQ. 20 reps alternating seated marches. 10 reps  of the following. Seated hip adduction pillow squeezes, hip abduction sets against therapist resistance. Pt tolerated well.   Therapeutic Activity   Therapeutic Activities: dynamic activities to improve functional performance Minutes (08383) 11   Symptoms Noted During/After Treatment Fatigue;Shortness of breath   Treatment Detail/Skilled Intervention pt agreed to therapy session. Pt on 1LPM O2 via NC, SPO2 at rest ranged %. Pt  completed additional STS to FWW, Min A, cues for anterior weight shift of trunk , cues for posture in standing. Increased time for room set up, chair set up, and vitals. Pt reports some dizziness upon sitting, symptoms resolved with time. Pt able to adjust self back into recliner chair, use of UE support of arm rests. Edu pt on PLB, cues throughout. Pt tolerated well, fatigued quickly. Pt left in recliner chair, chair alarm on, all needs in reach. BLE elevated on pillow with heels floating for pressure relief. RN aware pt up in chair.   Gait Training   Gait Training Minutes (60170) 2   Symptoms Noted During/After Treatment (Gait Training) shortness of breath   Treatment Detail/Skilled Intervention pt amb in room, cues for walker safety and management, cues for posture. Assist with IV pole and O2. Pt very fatigued with short bout of ambulation. Pt on 1LPM, SPO2 post GT ranged 92-93%.   [place] Level (Gait Training) minimum assist (75% patient effort)   Physical Assistance Level (Gait Training) supervision;verbal cues;nonverbal cues (demo/gestures);1 person + 1 person to manage equipment;set-up required   Assistive Device (Gait Training) rolling walker   PT Discharge Planning   PT Plan progress bed mobility, WC ride to stairs, trial stairs, progress LE strength exercises.   PT Discharge Recommendation (DC Rec) Transitional Care Facility;home with home care physical therapy;home with assist;Leaving home requires significant assistance;Leaving home requires significant taxing effort   PT Rationale for DC Rec Pt is below baseline functional independence, limited by decreased strength, decreased activity tolerance, fall risk, requiring supplemental oxygen. Pt has 3 stairs to get into home and a full flight of stairs to get to bedroom where hospital bed is located. Pt currently requiring assist of 1 for bed mobility, transfers, limited gait distance to approx 20' with FWW and becoming very SOB. Pt would benefit from  continued skilled therapy at TCU prior to returning home. If pt were to return home, pt would require Ax1 for all mobility and transfers, ramp to enter home, stair chair lift to get to bedroom upstairs, home PT/OT to progress independence.   PT Brief overview of current status bed mobility, CGA. STS to FWW, min-modA. Amb with FWW short distance, Malinda   Total Session Time   Timed Code Treatment Minutes 23   Total Session Time (sum of timed and untimed services) 33

## 2022-11-01 NOTE — PROGRESS NOTES
CLINICAL NUTRITION SERVICES - REASSESSMENT NOTE      Malnutrition: (10/29)  % Weight Loss:  > 7.5% in 3 months (severe malnutrition)  % Intake:  No decreased intake noted  Subcutaneous Fat Loss:  Orbital region moderate depletion and Upper arm region moderate depletion  Muscle Loss:  Temporal region severe depletion, Clavicle bone region severe depletion, Acromion bone region severe depletion, Dorsal hand region moderate depletion, Patellar region mod-severe depletion, Anterior thigh region mod-severe depletion and Posterior calf region mod-severe depletion  Fluid Retention:  None noted     Malnutrition Diagnosis: Severe malnutrition  In Context of:  Chronic illness or disease       EVALUATION OF PROGRESS TOWARD GOALS   Diet:    NPO    Nutrition Support:  Via G-tube  TwoCal HN at 30 mL/hr =  1440 kcals, 60 gm pro (1.14 gm/kg, 95%), 158 gm CHO, 4 gm fiber, 504 mL H20.    Nutrisource Fiber 1 packet per day = 15 kcals, 3 gm fiber  Banatrol (prebiotic fiber) 1 packet per day = 40 kcals, 2 gm fiber.  Total:  1495 kcals (28 kcal/kg, 95%), 9 gm fiber.    Free H20 flushes 120 mL every 4 hrs (720 mL).   Total Fluid (TF + flushes):  1225 mL (26 mL/kg).    Intake/Tolerance:    Chart reviewed  Pt tolerating TF  10/30: BM x1    Daily multivitamin     10/30: Banatrol was decreased to once daily, per son request (this is home frequency)        ASSESSED NUTRITION NEEDS:  Dosing Weight:  52.6 kg (10/31)   Estimated Energy Needs: 9161-2744 kcals (30-35 Kcal/Kg)  Justification: repletion and underweight  Estimated Protein Needs: 63-79 grams protein (1.2-1.5 g pro/Kg)  Justification: Repletion      NEW FINDINGS:     10/31/22 0703 52.6 kg (115 lb 15.4 oz) Bed scale   10/30/22 0407 52.9 kg (116 lb 10 oz) Bed scale   10/28/22 1753 47.2 kg (104 lb) --     10/31: WOCN - coccyx              No wounds to be found. Skin intact     Previous Goals (10/29):   TF + fiber modules + H20 flushes will meet % estimated needs  Evaluation:  Met    Previous Nutrition Diagnosis (10/29):   Inadequate fluid intake related to lack of free H20 flushes as evidenced by TF providing only 11 mL/kg  Evaluation: Improving          CURRENT NUTRITION DIAGNOSIS  No nutrition diagnosis identified at this time as EN meeting >90% estimated needs    INTERVENTIONS  Recommendations / Nutrition Prescription  NPO    Recommend continue with current EN regimen      Goals  EN to meet % estimated needs      MONITORING AND EVALUATION:  Progress towards goals will be monitored and evaluated per protocol and Practice Guidelines

## 2022-11-01 NOTE — PLAN OF CARE
Goal Outcome Evaluation:    November 1, 2022, 8660-5409    A&Ox4. VSS on 1L nasal cannula. States pain, managed with PRN tylenol x1. A/1 GB/ W, out of bed to the chair with PT x1, 1 BM this shift. Voiding in urinal at bedside with adequate UP.  Mepilex on coccyx, dressing CDI. T&R Q2 hours, Pulsair mattress in use. G-tube dressing changed, CDI Palliative consult ordered. Home oxygen consult ordered. Discharge expected 11/2 to home with son.

## 2022-11-01 NOTE — CONSULTS
Care Management Follow Up    Length of Stay (days): 3    Expected Discharge Date: 11/03/2022     Concerns to be Addressed: discharge planning     Patient plan of care discussed at interdisciplinary rounds: Yes    Anticipated Discharge Disposition: Home Care, Home     Anticipated Discharge Services: None  Anticipated Discharge DME: Oxygen    Patient/family educated on Medicare website which has current facility and service quality ratings:    Education Provided on the Discharge Plan:    Patient/Family in Agreement with the Plan: yes    Referrals Placed by CM/SW:    Private pay costs discussed: Not applicable    Additional Information:  SW spoke with pt and he reports he lives in a house with his son. Pt reports that  His son is his primary caregiver and assists pt with all tasks. Pt states he was at [facility] prior to current admission and due to health concerns, pt's son wheeled pt to the [facility] ED when [facility] could not assist with pt's medical concerns. Pt states he sat in the ED for 11 hours and felt he got really weak from sitting there. Pt stats at home he has a shower, chair, wheelchair, walker, hospital bed. Pt was interested in information about hospice. SW went over hospice and what ir looks like inf different locations. SW  Gave pt hospice agency list and the residential hospice list. Pt also expressed a need for oxygen. SW stated it can be somwthing that will be addressed by nursing/provider.       THEO De Jesus

## 2022-11-01 NOTE — PLAN OF CARE
A&Ox4. VSS on 1L of O2 via NC. Moderate lower back pain managed with PRN tylenol x1. A/2 GBW.  Voiding in urinal at bedside with adequate UP.  Mepilex on coccyx,  dressing CDI. Turn/Repo Q2 hours, on Pulsate mattress  possibly discharged home today.

## 2022-11-01 NOTE — PROGRESS NOTES
[facility]    Hospitalist Progress Note    Assessment & Plan   Efrem Ramos is a 77 year old male with PMHx of CAD with hx of stenting, hypertension, afib/flutter, CVA, collagenous colitis, MEL, and MGUS among other medical problems who was admitted on 10/29/2022 with acute hypoxic respiratory failure.      Acute hypoxic respiratory failure, multifactorial, dt underlying cardiopulmonary disease  Pulmonary fibrosis  Possible CAP vs aspiration pneumonia, organism unspecified  Acute on chronic severe valvular heart disease with severe AS, severe TR  * Presented to ED for several day history of worsening shortness of breath, cough and hypoxia on home oxyimeter readings. Was generally hesitant to present to ED but son convinced him to. Also had some vague mention of shaking chills.   * In ED, was afebrile, BPs soft but HRs stable. Needing 2L NC. WBC 21.1. Procal neg. ProBNP 4900. Trop neg. CXR showe bilateral opacities (R>L) which were unchanged from CXR in 9/2022 as well as superimposed patchy airspace disease in RML/RUL. High res CT chest obtained and showed findings of pulmonary fibrosis with possible superimposed pneumonitis and and small R pleural effusion.   * At time of admission, suspected respiratory issues were multifactorial given known pulmonary fibrosis, valve disease and chronic aspiration.   * Was initially placed on abx for CAP with ceftriaxone and azithromycin under abundance of caution but note low procal less suggestive of infection as sole etiology.   * In regards to treatment of possible cardiac etiologies (valve disease) --  limited ability to diurese patient dt soft BPs and appearance of euvolemia-hypovolemia (with soft BPs, concentrated appearing urine).  * Seen by [facility] this stay -- in short, they can offer no intervention or optimization. Not a candidate for surgical replacement of aortic valve or TAVR (per eval in 9/2022).  -- will complete 5d course of abx  on 11/2  -- remains NPO and on chronic TFs  -- weaning O2 as able -- down to 1L NC, will plan for home O2 at discharge     Goals of care planning  * Patient with underlying pulmonary fibrosis, chronic aspiration, cachexia and severe protein calorie malnutrition, severe aortic stenosis with severe mitral regurgitation. Hospitlaist this stay discussed consideration for hospice. This has been mentioned to patient and family multiple times in the past. Patient's general desire to have therapies available to him yet reluctance to present to the ED or hospital and desire not to be hospitalized is consistent with hospice. For instance, patient was hopeful that he might be able to receive antibiotics and home oxygen and be discharged from the ED. Despite general reluctance to be hospitalized, patient still would like medical treatments, such as antibiotics to be administered.  Patient is generally uncomfortable talking about his mortality. He expresses a desire to live. His son at bedside stated that despite his ailments, patient still finds gardenia in life. Requires essentially 24-hour assistance from son at home, son stated he will continue to attempt to provide these cares for his father as long as he is able to do so.  * Was agreeable to DNR/DNI code status on discussion this stay  * Discussed pall care consult this stay, ultimately deferred to OP setting.    CAD, with hx of prior stenting to RCA in 2020   Chronic atrial fibrillation with RVR, on chronic anticoagulation with DOAC  Hyperlipidemia  Chronic hypotension  * RVR noted earlier this stay. Subsequently resolved. Digoxin level okay on 10/29.   * Chronic and stable on home meds, including digoxin, statin and apixaban  * Not able to tolerate rate control meds dt chronic hypotension      Obstructive sleep apnea  * Cont CPAP HS as per home regimen.     MGUS  Chronic leukocytosis  Acute on chronic anemia  * Baseline WBC anywhere from 11-16 in recent months.  * Hgb stable  at 7 this stay.   * OP follow up with heme/onc.    Collagenous colitis  * Patient with significant issues with diarrhea after initiation on tube feeds.  Did have response to budesonide; history of biopsy-proven collagenous colitis in 2005.  * Stable on budesonide this stay.      Severe protein calorie malnutrition  Chronic aspiration  Stage I sacral pressure ulceration  * Appears to have some incontinence irritation plus pressure injury.  Limited mobility at baseline with cachexia place patient at high risk for wounds.  * Nutritionist managing TFs thi stay  * WOC RN following  * Conts on Remeron as per prior to admission.      FEN: no IVFs, lytes stable, NPO and on chronic TFs per nutritionist  DVT Prophylaxis: DOAC  Code Status: No CPR- Do NOT Intubate    Disposition: Plan was to discharge home on O2 today. Discussed with son, favors discharge home tomorrow to have everything ready/coordinated. As such, will discharge home tomorrow. Will resume home cares as per prior to admission. CC updated    Nishi Ramos, DO    Interval History   Patient seen this afternoon. Feeling okay. No specific complaints. Breathing okay on 1L NC. Denies pain. Tells me he's not ready to talk about hospice yet.     -Data reviewed today: I reviewed all new labs and imaging results over the last 24 hours. I personally reviewed no images or EKG's today.    Physical Exam   Temp: 97.4  F (36.3  C) Temp src: Oral BP: 100/65 Pulse: 88   Resp: 16 SpO2: 95 % O2 Device: High Flow Nasal Cannula (HFNC) Oxygen Delivery: 1 LPM  Vitals:    10/28/22 1753 10/30/22 0407 10/31/22 0703   Weight: 47.2 kg (104 lb) 52.9 kg (116 lb 10 oz) 52.6 kg (115 lb 15.4 oz)     Vital Signs with Ranges  Temp:  [97.4  F (36.3  C)-97.8  F (36.6  C)] 97.4  F (36.3  C)  Pulse:  [88-99] 88  Resp:  [16] 16  BP: ()/(53-78) 100/65  SpO2:  [89 %-99 %] 95 %  I/O last 3 completed shifts:  In: 700 [NG/GT:700]  Out: 1560 [Urine:1560]    Constitutional: Cachectic, frail  appearing male, lying quietly in bed, alert and conversing appropriately, NAD  Respiratory: diffuse crackles, no wheeze, no increased work of breathing  Cardiovascular:  irreg RR with ++SM, no LE edema  GI: S, NT, ND, +BS  Skin/Integumen: warm/dry  Other:      Medications     - MEDICATION INSTRUCTIONS -         apixaban ANTICOAGULANT  5 mg Per G Tube BID     atorvastatin  40 mg Per G Tube At Bedtime     azithromycin  250 mg Intravenous Q24H     budesonide  3 mg Oral BID     calcium carbonate  1,500 mg Per G Tube BID w/meals     cefTRIAXone  2 g Intravenous Q24H     digoxin  125 mcg Oral or G tube Daily     fiber modular  1 packet Per Feeding Tube Daily at 4 pm     fiber modular (NUTRISOURCE FIBER)  1 packet Per Feeding Tube Daily     gabapentin  300 mg Oral or G tube BID     gabapentin  600 mg Oral At Bedtime     mirtazapine  15 mg Per G Tube At Bedtime     multivitamins w/minerals  15 mL Per G Tube Daily     potassium chloride  20 mEq Per G Tube Daily     sodium chloride (PF)  3 mL Intracatheter Q8H       Data   Recent Labs   Lab 11/01/22  0728 10/31/22  1700 10/31/22  0840 10/30/22  0638 10/28/22  1805   WBC  --   --  15.8* 15.3* 21.1*   HGB  --   --  7.5* 7.1* 8.6*   MCV  --   --  103* 106* 107*   PLT  --   --  371 368 416   NA  --   --  136 136 132*   POTASSIUM 4.0  --  3.7 3.7 4.5   CHLORIDE  --   --  103 102 96   CO2  --   --  30 29 34*   BUN  --   --  16 18 23   CR  --   --  0.48* 0.50* 0.50*   ANIONGAP  --   --  3 5 2*   ULISSES  --   --  7.9* 7.8* 8.5   GLC  --  123* 105* 102* 108*   ALBUMIN  --   --   --  1.8* 2.2*   PROTTOTAL  --   --   --  6.9 8.2   BILITOTAL  --   --   --  0.3 0.7   ALKPHOS  --   --   --  251* 351*   ALT  --   --   --  68 123*   AST  --   --   --  37 88*       No results found for this or any previous visit (from the past 24 hour(s)).

## 2022-11-02 NOTE — PROGRESS NOTES
Home Oxygen Face to Face    I certify that this patient, Efrem Ramos has been under my care (or a nurse practitioner or physican's assistant working with me). This is the face-to-face encounter for oxygen medical necessity.      Efrem Ramos is now in a chronic stable state and continues to require supplemental oxygen. Patient has continued oxygen desaturation due to ILD J84.9  Pulmonary Fibrosis J84.10.    Alternative treatment(s) tried or considered and deemed clinically infective for treatment of ILD J84.9  Pulmonary Fibrosis J84.10 include pulmonary toileting and pulmonary rehab.    If portability is ordered, is the patient mobile within the home? Yes    Nishi Ramos, DO  Internal Medicine - Hospitalist  11/2/2022  4:11 PM

## 2022-11-02 NOTE — PLAN OF CARE
Goal Outcome Evaluation: A&Ox4. VSS on 0.5L nasal cannula. Pain managed with PRN Tylenol x1. Up with  A/1 GB/ W to commode with 1 small BM during shift.  Mepilex on coccyx, dressing CDI.Foam dressing placed on mid back as redness noted. T&R Q2 hours, Pulsair mattress in use. G-tube dressing WNL, CDI. Palliative consult pending. Home oxygen consult ordered. Discharge plans for today, home with son with HC & home O2.      Plan of Care Reviewed With: patient    Overall Patient Progress: no changeOverall Patient Progress: no change

## 2022-11-02 NOTE — PROVIDER NOTIFICATION
MD Notification    Notified Person: MD    Notified Person Name: Phill Frankel    Notification Date/Time: 11/02/2022 1242    Notification Interaction: up MD pt BP 83/60    Purpose of Notification:     Orders Received: Pause Tube feed,  Put pt in Trendelenburg,  Monitor BP    Comments: BP improved to normal limits

## 2022-11-02 NOTE — PROGRESS NOTES
Received intake call for home oxygen at 2:33PM. Reviewed patient's chart; patient's SATS and oxygen order were completed on 10/30. Which are no longer valid. Notes will need to state patient is mobile in home to qualify for oxygen portability.  2:41 pm-Spoke with  Rachel and informed of what needs to be done to issue oxygen.  2:47pm- Offered choice and patient is okay with [facility] home medical setting him up.  3:14pm- Order is in but we still need notes.  3:48 pm- Left a vm with  Rachel on update on notes.  4:10 pm-Received notes.  4:25 pm- Left a  with care coordinator, Kaley, confirmed we received the order, and provided them with ETA of oxygen.

## 2022-11-02 NOTE — PROGRESS NOTES
Paged for hypotension, SBP 83, asymptomatic per nursing staff, resolved after placed in Trendelenberg position. We continue to monitor him very closely overnight.

## 2022-11-02 NOTE — PROGRESS NOTES
Palliative consult received for goals of care. Reviewed case with Dr. Ramos, who reports that patient is discharging home today. Patient will establish care with our team outpatient with Dr. Flores on 11/22. Inpatient palliative consult canceled. Thanks.    FABI [facility]  Contact information available via [facility] Paging/Directory

## 2022-11-02 NOTE — DISCHARGE SUMMARY
[facility]    Discharge Summary  Hospitalist    Date of Admission:  10/29/2022  Date of Discharge:  11/2/2022  Discharging Provider: Nishi Ramos,     Discharge Diagnoses   Acute hypoxic respiratory failure, multifactorial, dt underlying cardiopulmonary disease  Pulmonary fibrosis  Possible CAP vs aspiration pneumonia, organism unspecified  Acute on chronic severe valvular heart disease with severe AS, severe TR  CAD, with hx of prior stenting to RCA in 2020   Chronic atrial fibrillation with RVR, on chronic anticoagulation with DOAC  Hyperlipidemia  Chronic hypotension  Obstructive sleep apnea  MGUS  Chronic leukocytosis  Acute on chronic anemia  Collagenous colitis  Severe protein calorie malnutrition  Chronic aspiration  Stage I sacral pressure ulceration    History of Present Illness   Efrem Ramos is a 77 year old male with PMHx of CAD with hx of stenting, hypertension, afib/flutter, CVA, collagenous colitis, MEL, and MGUS among other medical problems who was admitted on 10/29/2022 with acute hypoxic respiratory failure.     Hospital Course   Efrem Ramos was admitted on 10/29/2022.  The following problems were addressed during his hospitalization:    Acute hypoxic respiratory failure, multifactorial, dt underlying cardiopulmonary disease  Pulmonary fibrosis  Possible CAP vs aspiration pneumonia, organism unspecified  Acute on chronic severe valvular heart disease with severe AS, severe TR  * Presented to ED for several day history of worsening shortness of breath, cough and hypoxia on home oxyimeter readings. Was generally hesitant to present to ED but son convinced him to. Also had some vague mention of shaking chills.   * In ED, was afebrile, BPs soft but HRs stable. Needing 2L NC. WBC 21.1. Procal neg. ProBNP 4900. Trop neg. CXR showe bilateral opacities (R>L) which were unchanged from CXR in 9/2022 as well as superimposed patchy airspace disease in RML/RUL. High  res CT chest obtained and showed findings of pulmonary fibrosis with possible superimposed pneumonitis and and small R pleural effusion.   * At time of admission, suspected respiratory issues were multifactorial given known pulmonary fibrosis, valve disease and chronic aspiration.   * Was initially placed on abx for CAP with ceftriaxone and azithromycin under abundance of caution but note low procal less suggestive of infection as sole etiology. Completed 5d course of antibiotics this stay.   * In regards to treatment of possible cardiac etiologies (valve disease) --  limited ability to diurese patient dt soft BPs and appearance of euvolemia-hypovolemia (with soft BPs, concentrated appearing urine).  * Seen by [facility] this stay -- in short, they can offer no intervention or optimization. Not a candidate for surgical replacement of aortic valve or TAVR (per eval in 9/2022).  * Remains NPO, on chronic TFs.  * O2 weaned during stay, needing 1-2L NC in the days prior to discharge -- discharged on home O2     Goals of care planning  * Patient with underlying pulmonary fibrosis, chronic aspiration, cachexia and severe protein calorie malnutrition, severe aortic stenosis with severe mitral regurgitation. Hospitlaist this stay discussed consideration for hospice. This has been mentioned to patient and family multiple times in the past. Patient's general desire to have therapies available to him yet reluctance to present to the ED or hospital and desire not to be hospitalized is consistent with hospice. For instance, patient was hopeful that he might be able to receive antibiotics and home oxygen and be discharged from the ED. Despite general reluctance to be hospitalized, patient still would like medical treatments, such as antibiotics to be administered.  Patient is generally uncomfortable talking about his mortality. He expresses a desire to live. His son at bedside stated that despite his ailments, patient still  finds gardenia in life. Requires essentially 24-hour assistance from son at home, son stated he will continue to attempt to provide these cares for his father as long as he is able to do so.  * Was agreeable to DNR/DNI code status on discussion this stay  * Discussed pall care consult this stay, ultimately deferred to OP setting.     CAD, with hx of prior stenting to RCA in 2020   Chronic atrial fibrillation with RVR, on chronic anticoagulation with DOAC  Hyperlipidemia  Chronic hypotension  * RVR noted earlier this stay. Subsequently resolved. Digoxin level okay on 10/29.   * Chronic and stable on home meds, including digoxin, statin and apixaban  * Not able to tolerate rate control meds dt chronic hypotension      Obstructive sleep apnea  * Cont CPAP HS as per home regimen.     MGUS  Chronic leukocytosis  Acute on chronic anemia  * Baseline WBC anywhere from 11-16 in recent months.  * Hgb stable at 7 this stay.   * OP follow up with heme/onc.     Collagenous colitis  * Patient with significant issues with diarrhea after initiation on tube feeds.  Did have response to budesonide; history of biopsy-proven collagenous colitis in 2005.  * Stable on budesonide this stay.      Severe protein calorie malnutrition  Chronic aspiration  Stage I sacral pressure ulceration  * Appears to have some incontinence irritation plus pressure injury.  Limited mobility at baseline with cachexia place patient at high risk for wounds.  * Nutritionist managing TFs thi stay  * WOC RN following  * Conts on Remeron as per prior to admission.      Nishi Ramos DO    Pending Results   These results will be followed up by PCP  Unresulted Labs Ordered in the Past 30 Days of this Admission     Date and Time Order Name Status Description    10/29/2022  1:51 AM Blood Culture Wrist, Right Preliminary     10/29/2022  1:51 AM Blood Culture Arm, Right Preliminary           Code Status   DNR / DNI       Primary Care Physician   Danny Spivey  MD [place]    Physical Exam   Temp: 97.5  F (36.4  C) Temp src: Oral BP: 90/62 Pulse: 93   Resp: 20 SpO2: 92 % O2 Device: None (Room air) Oxygen Delivery: 1/2 LPM  Vitals:    10/30/22 0407 10/31/22 0703 11/02/22 0537   Weight: 52.9 kg (116 lb 10 oz) 52.6 kg (115 lb 15.4 oz) 45 kg (99 lb 3.3 oz)     Vital Signs with Ranges  Temp:  [97.5  F (36.4  C)] 97.5  F (36.4  C)  Pulse:  [80-95] 93  Resp:  [18-20] 20  BP: ()/(55-65) 90/62  SpO2:  [89 %-98 %] 92 %  I/O last 3 completed shifts:  In: 1431 [NG/GT:1431]  Out: 875 [Urine:875]    General: Cachectic, frail appearing male, lying quietly in bed, alert and conversing appropriately, NAD  CVS: irreg RR with ++SM, no LE edema  Respiratory: diffuse crackles, no wheeze, no increased work of breathing  GI: S, NT, ND, +BS, +PEG tube   Skin: Warm/dry  Neuro: CNs 2-12 intact, no focal motor/sensory deficits    Discharge Disposition   Discharged to home with son  Condition at discharge: Stable    Consultations This Hospital Stay    CARDIOLOGY IP CONSULT  ---------------------------------------  PHYSICAL THERAPY ADULT IP CONSULT  CARE MANAGEMENT / SOCIAL WORK IP CONSULT  NUTRITION SERVICES ADULT IP CONSULT  WOUND OSTOMY CONTINENCE NURSE  IP CONSULT  PHARMACY IP CONSULT  PALLIATIVE CARE ADULT IP CONSULT    Time Spent on this Encounter   INishi DO, personally saw the patient today and spent greater than 30 minutes discharging this patient.    Discharge Orders      Reason for your hospital stay    Evaluation of your breathing difficulties, which were due to progression of your underlying lung fibrosis and head disease.     Follow-up and recommended labs and tests     Follow up with your PCP in 1-2 weeks  Recommend discussion with outpatient palliative care team for ongoing discussion of goals of care and possible transition to hospice.     Activity    Your activity upon discharge: activity as tolerated, up with assistance as needed     Resume Home Care  Services     Oxygen Adult/Peds    Oxygen Documentation:   I certify that this patient, Efrem Ramos has been under my care (or a nurse practitioner or physician's assistant working with me). This is the face-to-face encounter for oxygen medical necessity.      Efrem Ramos is now in a chronic stable state and continues to require supplemental oxygen. Patient has continued oxygen desaturation due to ILD J84.9.    Alternative treatment(s) tried or considered and deemed clinically infective for treatment of ILD J84.9 include pulmonary toileting and pulmonary rehab.  If portability is ordered, is the patient mobile within the home? no    **Patients who qualify for home O2 coverage under the CMS guidelines require ABG tests or O2 sat readings obtained closest to, but no earlier than 2 days prior to the discharge, as evidence of the need for home oxygen therapy. Testing must be performed while patient is in the chronic stable state. See notes for O2 sats.**     Diet    Follow this diet upon discharge: NPO, with continuation of tube feeds     Discharge Medications   Current Discharge Medication List      CONTINUE these medications which have NOT CHANGED    Details   acetaminophen (TYLENOL) 500 MG tablet 1,000 mg by Per G Tube route 3 times daily as needed      apixaban ANTICOAGULANT (ELIQUIS) 5 MG tablet Take 1 tablet (5 mg) by mouth 2 times daily  Qty: 180 tablet, Refills: 3    Associated Diagnoses: Chronic a-fib (H)      atorvastatin (LIPITOR) 40 MG tablet Take 1 tablet (40 mg) by mouth daily  Qty: 90 tablet, Refills: 1    Associated Diagnoses: Coronary artery disease involving native coronary artery of native heart without angina pectoris      budesonide (ENTOCORT EC) 3 MG EC capsule Take 1 capsule (3 mg) by mouth 3 times daily Crushed and given hrough gastrostomy tube  Qty: 270 capsule, Refills: 3    Comments: To replace compounded liquid solution which is unavailable  Associated Diagnoses: Microscopic colitis,  unspecified microscopic colitis type      calcium carbonate 600 mg-vitamin D 400 units (CALTRATE) 600-400 MG-UNIT per tablet 1 tablet by Per G Tube route 2 times daily      digoxin (LANOXIN) 125 MCG tablet Take 1 tablet (125 mcg) by mouth daily  Qty: 90 tablet, Refills: 3    Associated Diagnoses: Chronic a-fib (H)      gabapentin (NEURONTIN) 300 MG capsule Take 300 mg AM, and 300 mg at lunch, and 600 mg at bedtime  Qty: 360 capsule, Refills: 3    Comments: Dose increase  Associated Diagnoses: Restless legs syndrome (RLS)      melatonin 5 MG tablet 5 mg by Per G Tube route At Bedtime      mirtazapine (REMERON) 15 MG tablet Take 1 tablet (15 mg) by mouth At Bedtime  Qty: 90 tablet, Refills: 3    Associated Diagnoses: Depression, unspecified depression type      multivitamin, therapeutic (THERA-VIT) TABS tablet Take 1 tablet by mouth daily      Phenylephrine-Acetaminophen 5-325 MG TABS Take 1 tablet by mouth 2 times daily Equate cold and flu      potassium chloride (KLOR-CON) 20 MEQ packet 20 mEq by Per G Tube route daily  Qty: 90 each, Refills: 3    Comments: To replace tablets  Associated Diagnoses: Hypokalemia      senna-docusate (SENOKOT-S/PERICOLACE) 8.6-50 MG tablet 2 tablets by Per G Tube route 2 times daily as needed for constipation      Aspirin Effervescent (WENDY-SELTZER ORIGINAL PO) Take 2 tablets by mouth 2 times daily      Banana Flakes (BANATROL PLUS) 1 packet by Per Feeding Tube route 3 times daily  Qty: 150 each, Refills: 3    Associated Diagnoses: Nutritional deficiency      diclofenac (VOLTAREN) 1 % topical gel Apply 4 g topically 4 times daily as needed for moderate pain  Qty: 150 g, Refills: 11    Associated Diagnoses: Age-related osteoporosis with current pathological fracture with routine healing, subsequent encounter      Guar Gum (FIBER MODULAR, NUTRISOURCE FIBER,) packet 1 packet by Per Feeding Tube route daily  Qty: 75 each, Refills: 3    Associated Diagnoses: Nutritional deficiency       Lidocaine (LIDOCARE) 4 % Patch Place 1 patch onto the skin every 24 hours To prevent lidocaine toxicity, patient should be patch free for 12 hrs daily. Apply to abdomen      Multiple Vitamins-Minerals (PRESERVISION AREDS 2 PO) Take 1 chew tab by mouth 2 times daily         STOP taking these medications       guaiFENesin (ROBITUSSIN) 100 MG/5ML liquid Comments:   Reason for Stopping:             Allergies   Allergies   Allergen Reactions     Sulfa Drugs Difficulty breathing, Swelling and Hives     Adhesive Tape Blisters     Amiodarone Other (See Comments)     Developed pleural effusion     Latex Unknown     Cephalosporins      Tolerated ceftriaxone      Penicillins Hives     Reaction occurred as a child  Tolerated ceftriaxone in past   Other reaction(s): Hives     Data   Most Recent 3 CBC's:Recent Labs   Lab Test 10/31/22  0840 10/30/22  0638 10/28/22  1805   WBC 15.8* 15.3* 21.1*   HGB 7.5* 7.1* 8.6*   * 106* 107*    368 416      Most Recent 3 BMP's:  Recent Labs   Lab Test 11/01/22  0728 10/31/22  1700 10/31/22  0840 10/30/22  0638 10/28/22  1805   NA  --   --  136 136 132*   POTASSIUM 4.0  --  3.7 3.7 4.5   CHLORIDE  --   --  103 102 96   CO2  --   --  30 29 34*   BUN  --   --  16 18 23   CR  --   --  0.48* 0.50* 0.50*   ANIONGAP  --   --  3 5 2*   ULISSES  --   --  7.9* 7.8* 8.5   GLC  --  123* 105* 102* 108*     Most Recent 2 LFT's:  Recent Labs   Lab Test 10/30/22  0638 10/28/22  1805   AST 37 88*   ALT 68 123*   ALKPHOS 251* 351*   BILITOTAL 0.3 0.7     Results for orders placed or performed during the hospital encounter of 10/29/22   XR Chest 2 Views    Narrative    EXAM: XR CHEST 2 VIEWS  LOCATION: [facility]  DATE/TIME: 10/28/2022 6:30 PM    INDICATION: Hypoxemia.  COMPARISON: 09/20/2022.      Impression    IMPRESSION: Right greater than left interstitial opacities are unchanged from prior examination. Superimposed patchy airspace disease at the right mid to upper lung  is slightly improved, favoring residual pneumonia. Continued imaging follow-up to   resolution is recommended.    Unchanged small right pleural effusion. No pneumothorax.    Cardiomediastinal silhouette is normal. Dense calcifications of the mitral annulus. Atheromatous calcifications of the thoracic aorta.   CT Chest Hi-Resolution wo Contrast    Narrative    EXAM: CT CHEST HI-RESOLUTION WO CONTRAST  LOCATION: [facility]  DATE/TIME: 10/29/2022 2:08 AM    INDICATION: Short of breath. concern for pneumonia versus fibrosis versus edema  COMPARISON: 09/07/2022  TECHNIQUE: High resolution images were obtained through the chest during inspiration with select expiratory views. Prone imaging was performed. Multiplanar reformats were obtained. Dose reduction techniques were used.  CONTRAST: None.    FINDINGS:   LUNGS AND PLEURA: Pulmonary fibrosis with traction bronchiectasis and honeycombing again noted. Interval improvement in superimposed infiltrate seen previously. Pneumonitis in the right upper lobe not excluded. Small right effusion.    MEDIASTINUM/AXILLAE: Mediastinal adenopathy is again seen. Atherosclerotic aorta. Mitral annular calcification. Small amount of pericardial fluid similar.    CORONARY ARTERY CALCIFICATION: Severe.    UPPER ABDOMEN: Cholelithiasis.    MUSCULOSKELETAL: Degenerative change osseous structures. Prior vertebral plasty. Lower thoracic compression fractures.      Impression    IMPRESSION:   1.  Pulmonary fibrosis. Improvement in previously seen superimposed infiltrates. There is persistent interstitial thickening right upper lung which could be pneumonitis superimposed on underlying fibrosis.  2.  Small right effusion.  3.  Cholelithiasis     *Note: Due to a large number of results and/or encounters for the requested time period, some results have not been displayed. A complete set of results can be found in Results Review.

## 2022-11-02 NOTE — PLAN OF CARE
Goal Outcome Evaluation:             Pt discharge and went home with all the belongings and home oxygen. .Discharge paperwork explained and given to pt .Periferal line removed.

## 2022-11-03 NOTE — PROGRESS NOTES
"Care Management Follow Up    Length of Stay (days): 4    Expected Discharge Date: 11/02/2022     Concerns to be Addressed: discharge planning     Patient plan of care discussed at interdisciplinary rounds: Yes    Anticipated Discharge Disposition: Home, Home Care     Anticipated Discharge Services: None  Anticipated Discharge DME: Oxygen    Patient/family educated on Medicare website which has current facility and service quality ratings: no  Education Provided on the Discharge Plan:    Patient/Family in Agreement with the Plan: yes    Referrals Placed by CM/SW: Durable Medical Equipment (DME), Homecare  Private pay costs discussed: Not applicable    Additional Information:  Writer updated by RN/MD that pt needs oxygen at discharge. Home O2 is new for patient. Writer explained that RN needs to reach out to  Home Medical to make sure that the discharge home O2 order was received. As there were further questions about the process, kikir called  home medical to follow up. This was at approximately 10 AM. Writer was on hold for approximately 20 minutes. Left a VM as instructed to get a call back. The bedside RN contacted  around 12 PM asking about the discharge O2. Kikir explained the discharge process and told the RN to call [facility] medical to follow up. She expressed understanding. At approximately 2 PM yesterday,  had not received a call back from [facility] and the oxygen had not been delivered to the patient.  called and had  home med paged by the urgent answering service.  received a call back from a  who told kikir that there were \"a few problems\" with the order: order had been written two days ago, and in the dot phrase note within the order, the provider had marked that the patient was non-ambulatory within the home (portable O2 had been ordered). The rep reported that they would be able to deliver the oxygen with the nursing O2 assessment note from " 10/30. Writer texted Dr. Ramos (hospitalist) who made these corrections to the order. Writer received a call back about 30 minutes later and the rep said that she had not received the new order. Writer responded that the new order had been entered. The  verified that she had received the new order but that the dot phrase was still incorrect. Writer verified that the order was written as specified by the rep. Writer attempted to call the rep back, but was on hold again. At this point, approximately 4 PM, the writer updated the charge RN that she was unable to reach  home medical so she or bedside RN would have to follow up with  Home Medical. Charge RN expressed agreement with the plan at this time.     Kaley Horan RN  Inpatient Care Coordinator

## 2022-11-03 NOTE — PROGRESS NOTES
"Clinic Care Coordination Contact  [facility]: Post-Discharge Note  SITUATION                                                      Admission:    Admission Date: 10/29/22   Reason for Admission: Respiratory Problems  Discharge:   Discharge Date: 11/02/22  Discharge Diagnosis: Acute hypoxic respiratory failure    BACKGROUND                                                      Per hospital discharge summary and inpatient provider notes:Efrem Ramos was admitted on 10/29/2022.  The following problems were addressed during his hospitalization:     Acute hypoxic respiratory failure, multifactorial, dt underlying cardiopulmonary disease  Pulmonary fibrosis  Possible CAP vs aspiration pneumonia, organism unspecified  Acute on chronic severe valvular heart disease with severe AS, severe TR  * Presented to ED for several day history of worsening shortness of breath, cough and hypoxia on home oxyimeter readings. Was generally hesitant to present to ED but son convinced him to. Also had some vague mention of shaking chills.   * In ED, was afebrile, BPs soft but HRs stable. Needing 2L NC. WBC 21.1. Procal neg. ProBNP 4900. Trop neg. CXR showe bilateral opacities (R>L) which were unchanged from CXR in 9/2022 as well as superimposed patchy airspace disease in RML/RUL. High res CT chest obtained and showed findings of pulmonary fibrosis with possible superimposed pneumonitis and and small R pleural effusion.   * At time of admission, suspected respiratory issues were multifactorial given known pulmonary fibrosis, valve disease and chronic aspiration.   * Was initially placed on abx for CAP with ceftriaxone and azithromycin under abundance of caution but note low procal less suggestive of infection as sole etiology. Completed 5d course of antibiotics this stay      ASSESSMENT           Discharge Assessment  How are you doing now that you are home?: \" Doing ok \"  How are your symptoms? (Red Flag symptoms escalate to triage " hotline per guidelines): Improved  Do you feel your condition is stable enough to be safe at home until your provider visit?: Yes  Does the patient have their discharge instructions? : Yes  Does the patient have questions regarding their discharge instructions? : No  Were you started on any new medications or were there changes to any of your previous medications? : No  Does the patient have all of their medications?: Yes  Do you have questions regarding any of your medications? : No  Do you have all of your needed medical supplies or equipment (DME)?  (i.e. oxygen tank, CPAP, cane, etc.): Yes  Discharge follow-up appointment scheduled within 14 calendar days? : Yes  Discharge Follow Up Appointment Date: 11/10/22  Discharge Follow Up Appointment Scheduled with?: Primary Care Provider    Post-op ([facility] Only)  If the patient had a surgery or procedure, do they have any questions for a nurse?: No             PLAN                                                      Outpatient Plan: Follow up with your PCP in 1-2 weeks  Recommend discussion with outpatient palliative care team for ongoing discussion of goals of care and possible transition to hospice.    Future Appointments   Date Time Provider Department Center   11/10/2022 11:00 AM Sulma Davalos PA-C [facility]    11/22/2022 12:40 PM [facility]         For any urgent concerns, please contact our 24 hour nurse triage line: [phone] ([phone])         Kamilah Thompson

## 2022-11-03 NOTE — PLAN OF CARE
Physical Therapy Discharge Summary    Reason for therapy discharge:    Discharged to home with home therapy. And family assist.    Progress towards therapy goal(s). See goals on Care Plan in [facility] electronic health record for goal details.  Goals not met.  Barriers to achieving goals:   limited tolerance for therapy and discharge from facility.    Therapy recommendation(s):    Continued therapy is recommended.  Rationale/Recommendations:  PT recommended pt discharge to TCU d/t assist 1 required for all mobility, pt weak and with poor activity tolerance. Pt went home with essentially 24 hour assist from son and home PT. No goals met.

## 2022-11-03 NOTE — TELEPHONE ENCOUNTER
Billy from [facility] called to request   PT REEVAL for strength and endurance due to patient getting out of hospital      nursing 1w3 education for disease management and medications     Verbal order given    Alex Ortiz RN

## 2022-11-14 NOTE — TELEPHONE ENCOUNTER
"Operative procedure:  Laparoscopic assisted placement of gastrostomy tube    Date: 07/28/2022    Surgeon: Gus    Patient's feeding tube is leaking on the other side of the \"plug.\" Patient's son is not sure how much feeding patient is actually getting because of the leaking.    Informed him that I will contact IR and have them call him to set up a tube change out    He verbalizes understanding and will wait for call back    Marian Davenport RN-BSN    "

## 2022-11-14 NOTE — TELEPHONE ENCOUNTER
Natasha MORALES following up regarding order for trapeze for patient's hospital bed.    Originally called 10/13. It is not known where patient's hospital bed came from. Patient had business card from delivery company.    TC to please call [email or web address] in [place] [phone] to inquire which med supply patient's bed originated so that trapeze can be ordered.   Juliana Sharpe RN

## 2022-11-14 NOTE — TELEPHONE ENCOUNTER
Name of caller: sonEfrem  Consent to communicate on file.    Reason for Call:  ptient's feeding tube is leaking.    Surgeon:  Dr. Weber    Recent Surgery:  Yes.    If yes, when & what type:  7/28/2022      LAPAROSCOPIC ASSISTED GASTROSTOMY TUBE PLACEMENT    Best phone number to reach pt at is: [phone]    Ok to leave a message with medical info? Yes.

## 2022-11-16 NOTE — TELEPHONE ENCOUNTER
I have called and spoken with Nereyda ZAYAS at [facility] IR on 11/15/2022, who confirmed that arrival time for this procedure/patient would be 1 hour before scheduled procedure, not 1.5 hours as listed in appts.       Writer has spoken with Alfredo (Patient son) regarding planned procedure with IR via telephone.  Alfredo acknowledges understanding of pre-procedure instructions.    Alfredo plans on assisting patient with performing a home rapid COVID test prior to procedure date.  I have provided Alfredo with IR number ([phone]) for questions or concerns.    Analisa BILLINGS  Interventional Radiology RN   [phone]

## 2022-11-17 NOTE — PRE-PROCEDURE
GENERAL PRE-PROCEDURE:   Procedure:  Gastrostomy tube exchange  Date/Time:  11/17/2022 10:11 AM    Written consent obtained?: Yes    Risks and benefits: Risks, benefits and alternatives were discussed    Consent given by:  Patient  Patient states understanding of procedure being performed: Yes    Patient's understanding of procedure matches consent: Yes    Procedure consent matches procedure scheduled: Yes    Appropriately NPO:  Yes  Patient's son, Alfredo, wanted to know if he could change the g tube at home. When he asked the home care RN they stated that since it was placed surgically they weren't comfortable doing the exchange at home.     I explained that once the g tube was placed and over time a tract has formed and there should be no restrictions on exchanging at home. I discussed with IR Dr Hankins and he will evaluate whether the exchange is straight forward and uncomplicated and whether he feels this could be done at home.     IR does not do the teaching for g tube exchanges at home which Alfredo is aware of.     Thanks [facility] Interventional Radiology CNP ([phone]) (phone [phone])

## 2022-11-17 NOTE — PROGRESS NOTES
Care Suites Discharge Nursing Note    Patient Information  Name: Efrem Ramos  Age: 77 year old    Discharge Education:  Discharge instructions reviewed: Yes  Additional education/resources provided: no  Patient/patient representative verbalizes understanding: Yes  Patient discharging on new medications: No  Medication education completed: N/A    Discharge Plans:   Discharge location: home  Discharge ride contacted: Yes  Approximate discharge time: 1100    Discharge Criteria:  Discharge criteria met and vital signs stable: Yes    Patient Belongs:  Patient belongings returned to patient: Yes    Pardeep Butterfield RN

## 2022-11-17 NOTE — PROGRESS NOTES
Care Suites Admission Nursing Note    Patient Information  Name: Efrem Ramos  Age: 77 year old  Reason for admission: G-tube exchange  Care Suites arrival time: 1015    Visitor Information  Name: Junior  Informed of visitor restrictions: Yes  1 visitor allowed per patient   Visitor must screen negative for COVID symptoms   Visitor must wear a mask  Waiting rooms closed to visitors    Patient Admission/Assessment   Pre-procedure assessment complete: Yes  If abnormal assessment/labs, provider notified: N/A  NPO: Yes  Medications held per instructions/orders: Yes  Consent: obtained  If applicable, pregnancy test status: deferred  Patient oriented to room: Yes  Education/questions answered: Yes  Plan/other: tube exchange     Discharge Planning  Discharge name/phone number: Junior [phone]   Overnight post sedation caregiver: junior  Discharge location: home    Pardeep Butterfield RN

## 2022-11-17 NOTE — DISCHARGE INSTRUCTIONS
G-tube Exchange Discharge Instructions     After you go home:    You may resume your normal diet    Care of Insertion Site:    For the first 48 hrs, check your puncture site every couple hours while you are awake   You may remove/change the dressing tomorrow  You may shower tomorrow  No tub baths, whirlpools or swimming until your puncture site has fully healed     Activity     You may go back to normal activity in 24 hours  Wait 48 hours before lifting, straining, exercise or other strenuous activity    Medicines:    You may resume all medications  Resume your Warfarin/Coumadin at your regular dose today. Follow up with your provider to have your INR rechecked  Resume your Platelet Inhibitors and Aspirin tomorrow at your regular dose  For minor pain, you may take Acetaminophen (Tylenol) or Ibuprofen (Advil)                 Call the provider who ordered this procedure if:    Blood or fluid is draining from the site  The site is red, swollen, hot, tender or there is foul-smelling drainage  Chills or a fever greater than 101 F (38 C)  Increased pain at the site  Any questions or concerns    Call  911 or go to the Emergency Room if:    Severe pain or trouble breathing  Bleeding that you cannot control      If you have questions call:          [facility] Radiology Dept @ [phone]        The provider who performed your procedure was _________________.        Caring for your G-tube    Tube Maintenance:    For ENFit Tubes:    Do NOT over tighten the connections (the purple end & hub connection).    Clean the connecting ends (especially the hub) every day.    If you are unable to disconnect the tubing ends, soak the connection in warm water (or wrap in a wet warm washcloth) to try and loosen the connection.    Possible problems with your tube may include:    Clogged with medications or feedings - most obstructions can be cleared with a small (3cc) syringe and warm water. This may be repeated until the tube is  unclogged. This can be prevented by frequently flushing the tube with water (60cc) during the day and always after medications & feedings.    Tube pulls out or falls out -cover the opening with gauze & tape. Call [phone] for further instructions    Skin breakdown and/or yeast infections at the insertion site - use of skin barrier ointments and anti-fungal powders can treat most site irritations.  Ask your pharmacist or provider for assistance (a prescription is not necessary).    In general, tube problems (including pulled tubes) are NOT emergency situations. Unless the pulling out of a tube is accompanied by uncontrollable bleeding, please DO NOT GO TO THE EMERGENCY ROOM!  Call [phone] with problems.    Tube Care:    Change the gauze dressing every 24 hours and if soiled (dirty).  Stabilize all tubes securely by using gauze and tape.  Clean tube site with soap & water using a cotton applicator (Q-tip) as needed to prevent irritation.  Flush feeding tube with 60cc of warm or room temperature water before and after meds.  To prevent the tube from clogging, ask your provider or pharmacist if liquid forms of your medications are available. If not, crush the pills well & be sure to flush the tube before & after all medications.  Flush feeding tube a minimum of every 4 hours and when feeding is completed with 60cc of water to keep the tube clear and avoid clogging.  Pt may use an abdominal (waist) binder to protect the G-tube.    If there is continued oozing or bleeding, redness, yellow/green/foul smelling drainage    STOP the feedings & use of the tube immediately if there is:    Continued oozing or bleeding at the site  Redness  Yellow/green or foul smelling drainage at the site  Uncontrolled stomach pain    Many of the supplies mentioned above can be purchased at your local pharmacy      For issues with your tube, please call:    [facility] Interventional Radiology Dept at [phone]

## 2022-11-17 NOTE — IR NOTE
Interventional Radiology Intra-procedural Nursing Note    Patient Name: Efrem Ramos  Medical Record Number: [id number]  Today's Date: November 17, 2022    Procedure: Gastrostomy tube exchange  Start time: 1031  End time: 1036  Report provided to: Pardeep ZAYAS  Patient depart time and location: 1040 to care suites    Note: Patient entered Interventional Radiology Suite number 1 via cart. Patient awake, alert and oriented. Assisted onto procedural table in supine position. Prepped and draped.  Dr. Hankins in room. Time out and procedure started.     Procedure well tolerated by patient without complications. Procedure end with debrief by Dr. Hankins.  Gauze dressing applied to LUQ interventional procedure access site.

## 2022-11-21 NOTE — TELEPHONE ENCOUNTER
The Home Care/Assisted Living/Nursing Facility is calling regarding an established patient.  Has the patient seen Home Care in the past or is currently residing in Assisted Living or Nursing Facility? No.     Thalia ZAYAS calling from [facility] requesting the following orders that are NOT within the Home Care, Assisted Living or Nursing Home Eval and Treatment standing order and must be ordered by a Licensed Practitioner.    Preferred Call Back Number: [phone]    SN for G tube EOW for 8 weeks    Routing to Licensed Practitioner (Provider) to review request and provide approval or recommendation.    Writer has verified Requestor will send fax to have orders signed.    Geovanna Cerna RN  [place] [facility] [place] Triage Team

## 2022-11-23 NOTE — TELEPHONE ENCOUNTER
PT called for orders - diomedes for hospital bed     DME order needs to go to the place where the hospital bed was from, PT doesn't know where, states we need to call [facility] in [place] [phone] to determine where to send DME order     Left a detailed message asking [facility] rep to call back to verify where trapeze order needs to go     Also - Verbal given on below requested HC orders     PCP - pended miscellaneous DME order for diomedes BURNS, Triage RN  [facility]

## 2022-11-28 NOTE — TELEPHONE ENCOUNTER
Son and caregiver calling to report [facility] does take patient insurance.     Please print and fax DME for trapeze to [facility] fax: [phone]      Adriana Magallanes RN on 11/28/2022 at 2:34 PM

## 2022-11-28 NOTE — TELEPHONE ENCOUNTER
Thalia from [facility] calling to get an order for oxygen. Thalia states that the discharge paperwork from the hospital included a lower oxygen order than needed.     Home care nurse requests 1-4L continuous of humidified oxygen to keep O2 sats above 90%.     Writer asked if this is a new oxygen need. Home care nurse stated that this is not a new need, but that patient is dying and refuses hospice and that he needs increased oxygen when ambulating.     Writer called the patient and spoke to son Efrem (C2C), who states that 3L per minute is not adequate unless patient is laying perfectly still. Son states that patient has progressive pulmonary fibrosis.     Writer triaged Hypoxia:    Main concern: low O2 sats down to 70s or 80s while ambulating. Cannot keep O2 above 90% at 3L per minute unless absolutely still.  Onset: Per chart review, it appears that he was recently put on oxygen this past month, not being on oxygen at home at the 10/29/22 ED visit:    Oxygen Therapy: Not on humidified O2 but consistently on 3L per minute  Breathing difficulty: Severe breathing difficulty when ambulating, moderate SOB at rest  Other symptoms: denies fever    Patient's son states that the patient was so poorly over the Thanksgiving weekend that the patient's son started the patient on antibiotics around 11/24/22 because patient's son thought it couldn't hurt. The antibiotics seemed to help. Patient's son also gave him probiotics today as well (11/28/22).    Patient's son suggested anti-anxiety medications, stating that the patient's breathing and O2 sats are much better when he's relaxed. Patient son states that he knows that the patient should consider palliative/hospice care, but patient refuses. Palliative care appointment on 11/22/22 did not work out due to video technology issues. Son stated he has another palliative care appointment upcoming on 12/20/22. Patient's son was tearful when talking about patient's health  decline.      Nurse Triage SBAR    Is this a 2nd Level Triage? YES, LICENSED PRACTITIONER REVIEW IS REQUIRED    Protocol Recommended Disposition:   Go to ED Now    Recommendation:   Patient's son reports that the patient refuses to go to the ED and is currently on oxygen at home. PCP, patient's son is requesting an oxygen concentrator that allows a higher, humidified flow in order for the patient to stay at home longer.    Callback to patient's son: [phone]. Leave to leave a detailed vm on that line.    Callback to Thalia at [facility]: [phone] ok to leave a detailed vm      Routed to provider    Does the patient meet one of the following criteria for ADS visit consideration? 16+ years old, with an [facility] PCP     TIP  Providers, please consider if this condition is appropriate for management at one of our Acute and Diagnostic Services sites.     If patient is a good candidate, please use dotphrase <dot>triageresponse and select Refer to ADS to document.    Anna Hernandez RN  [facility]      Reason for Disposition    [1] MODERATE difficulty breathing (e.g., speaks in phrases, SOB even at rest, pulse 100 - 120) AND [2] new-onset or worse than normal    Additional Information    Negative: SEVERE difficulty breathing (e.g., struggling for each breath, speaks in single words, pulse > 120)    Negative: Bluish (or gray) lips or face now    Negative: Difficult to awaken or acting confused (e.g., disoriented, slurred speech)    Negative: Slow, shallow and weak breathing    Negative: Sounds like a life-threatening emergency to the triager    Negative: Breathing difficulty is main concern    Negative: [1] Wheezing (high pitched whistling sound) is main concern AND [2] previous asthma attacks or use of asthma medicines    Negative: Diagnosed with Chronic Pulmonary Obstructive Disease (COPD) and on oxygen therapy    Protocols used: OXYGEN MONITORING AND [id number]-DA

## 2022-11-28 NOTE — TELEPHONE ENCOUNTER
"Natasha, PT [facility] returning below phone call.    Natasha states they would like order for trapeze to be sent to \"wherever you normally send it\"    [facility] does not supply hospital beds, RN called [facility] who does carry hospital beds/trapeze.     Writer called patient's son Efrem (C2C) to clarify if patient would like order sent to [facility] - patient will call [facility] at [phone] and verify if covered by patient's insurance.    Advised patient's son Efrem (C2C) to call back after speaking with [facility] if orders should be faxed to [facility] at [phone].     Will route back to triage to follow up with patient - once patient returns call - please print and fax DME for trapeze and send to desired home medical supply     Mita Siu RN  [facility]    "

## 2022-11-29 NOTE — TELEPHONE ENCOUNTER
PCP huddled with writer to notify home care that orders requested below were approved, writer placed call to Oksana - MARQUEZ from [facility] to notify of the orders.    Patient Contact    Attempt # 1    Was call answered?  No.  Left detailed message on confidential VM of Oksana to notify that orders were approved.    Triage to to please recall Thalia ZAYAS to inquire if verbal / written orders are needed for oxygen/concentrator?    Mita Siu RN  [facility]

## 2022-11-29 NOTE — TELEPHONE ENCOUNTER
Writer faxed signed DME to [facility] at [phone].     Thalia states she does not need a call back, placed call to son Efrem and notified that order was faxed to [facility].    Spoke with Efrem Topete appreciative of callback regarding supplies for oxygen.    States patient is willing to further discuss the possibility of enrolling in hospice, suman Topete states he is going to make calls to hospice agency (as discussed with PCP this morning) and call Oksana ZAYAS case manager today to further discuss.     No further questions/concerns at this time. Writer encouraged suman Topete to call back to clinic if anything further is needed from PCP/team, Efrem states he will do so.     Routing to PCP as THIERRY Siu RN  [facility]

## 2022-11-29 NOTE — TELEPHONE ENCOUNTER
I spoke to Efrem Ramos's son and we agreed to increase oxygen to 4 liters - OK to send in order for higher oxygen concentrator as well as use of humidifier    We discussed the need for Hospice and phone number was given to Efrem to speak with hospice team.  Order for hospice was offered and declined, but will follow up again soon possibly this afternoon or tomorrow.     Danny Paige MD, MD

## 2022-11-29 NOTE — TELEPHONE ENCOUNTER
Pt's son Alfredo was called with providers message. States pt refuses to go to ER and last time he went to ER, pt had to be on a wheel chair for 11 hours. Son doesn't feel that is safe for him. States they have been informed at ER they can't do anything else for him and doctors asked pt where does he want to die at the hospital or at home. Pt wishes to die at home. Son reports they were referred to palliative care for a  consults but appt needed to be rescheduled due to having connection problems with virtual visit. Per son, pt would be risking his life if he goes to ER. He is questioning why PCP would not approve increase oxygen order. Son agreed to inform pt of providers message. Per chart review, pt has appt 12/20/2022 for palliative care but son is questioning what to do about pt's oxygen now. Triage advised he go to ER but son doesn't not to do that. Please advise if any other recommendations at this time.      Son will need a call back with providers advice.

## 2022-11-29 NOTE — TELEPHONE ENCOUNTER
Writer called and spoke with MARQUEZ Vásquez from [facility], who states she received the verbal orders for oxygen to 4 liters.    Thalia states for the O2 concentrator they will need an order faxed to [facility] for a humidified oxygen concentrator.    Oksana states she will reach out to son Efrem to update that order for humidified oxygen concentrator will be faxed to [facility].    Pended DME, routing to PCP to please sign and route back to triage to fax order to [facility]     Mita Siu RN  [facility]

## 2022-11-30 NOTE — TELEPHONE ENCOUNTER
Called suman Topete on [facility] [phone]     FYI PCP Alfredo [patient] passed away this morning     Bella BURNS, Triage RN  [facility]

## 2022-12-02 DIAGNOSIS — Z53.9 DIAGNOSIS NOT YET DEFINED: Primary | ICD-10-CM

## 2022-12-02 PROCEDURE — 99207 PR MD RECERTIFICATION HHA PT: CPT | Performed by: INTERNAL MEDICINE

## 2022-12-07 ENCOUNTER — MEDICAL CORRESPONDENCE (OUTPATIENT)
Dept: HEALTH INFORMATION MANAGEMENT | Facility: CLINIC | Age: 77
End: 2022-12-07

## 2022-12-12 ENCOUNTER — MEDICAL CORRESPONDENCE (OUTPATIENT)
Dept: HEALTH INFORMATION MANAGEMENT | Facility: CLINIC | Age: 77
End: 2022-12-12

## 2022-12-16 ENCOUNTER — MEDICAL CORRESPONDENCE (OUTPATIENT)
Dept: HEALTH INFORMATION MANAGEMENT | Facility: CLINIC | Age: 77
End: 2022-12-16

## 2022-12-16 NOTE — TELEPHONE ENCOUNTER
"Kesha from [facility] called back and stated Pt passed. Was conversing with Pt's son last week and then this past weekend she received a text that said \"my dad passed away.\"  "

## 2023-01-03 NOTE — ED NOTES
Goal Outcome Evaluation:              Outcome Evaluation: patient has rested well in bed during shift. no changes noted.   Rapid Assessment Note    History:   Efrem Ramos is a 77 year old male who presents with low oxygen saturation that has been intermittently ongoing for some time now. His son reports that recently his father has been having low oxygen readings at his house. His at home nurse recommended he come into the ED 2 days ago. The patient was uninterested in coming in, but his son convinced him to go. He reports that his oxygen readings have been in the 80s and lower. He uses an at home finger oxygen reader. Alongside this, the son reports a recent hospitalization for pneumonia. The patient denies increased shortness of breath and cough. The patient reports history of esophageal cancer.    Exam:   General:  Alert, interactive  Cardiovascular:  Well perfused  Lungs:  No respiratory distress, no accessory muscle use. Lungs clear. Respiratory effort is normal.  Neuro:  Moving all 4 extremities  Skin:  Warm, dry  Psych:  Normal affect    Plan of Care:   Interestingly, while I was in the triage room, his oxygen level never dropped below 92% and it was mostly 95 to 98% on room air.  There were times when he would be at 91% and then 1 second later would go up to 97% making me think that its not picking up his pulse accurately on his home 1 as well.  Feel that with his history of pneumonia that he needs a chest x-ray and some blood work to make sure it safe to discharge him home.  The patient does not want to be admitted.  He said he does not feel any more short of breath and normally feels at his baseline but his family was concerned because of the readings they were getting on a home pulse oximeter.    I evaluated the patient and developed an initial plan of care. I discussed this plan and explained that I, or one of my partners, would be returning to complete the evaluation.     I, Yusef Villafana, am serving as a scribe to document services personally performed by Aurea Laureano MD, based on my observations and the provider's  statements to me.    10/28/2022  [employer]    Portions of this medical record were completed by a scribe. UPON MY REVIEW AND AUTHENTICATION BY ELECTRONIC SIGNATURE, this confirms (a) I performed the applicable clinical services, and (b) the record is accurate.        Aurea Laureano MD  10/28/22 0666

## 2023-01-23 ENCOUNTER — MEDICAL CORRESPONDENCE (OUTPATIENT)
Dept: HEALTH INFORMATION MANAGEMENT | Facility: CLINIC | Age: 78
End: 2023-01-23

## 2023-05-17 NOTE — PROGRESS NOTES
Date/Time:07/23 ,4493-4789    Trauma/Ortho/Medical (Choose one)     Diagnosis:Pneumonia    Mental Status:A&O x 4  Activity/dangle:A-1 walker ,GB  Diet:Pureed,with mildly thick liquids  Pain:Tylenol and Oxycodone  Bravo/Voiding:Urinal  Tele/Restraints/Iso:None  02/LDA:Supplemental O2, at 2 lpm d/t desaturation 89% on RA  D/C Date:TBD  Other Info:Infrequent productive cough.  0.9% sodium chloride + KCl 20 mEq/L infusion     Lung sounds coarse and diminished,  sounds congested especially when coughing.  Pt shivering this morning with no temp,warm blanket offered.Continue to monitor.         PPM IMPLANTATION

## 2023-05-30 NOTE — ED TRIAGE NOTES
Patient here with low O2.  Not on home O2.  Mostly bedridden.  Recent pneumonia.       
ab pain  labs, imaging, supportive care, ekg

## 2024-03-23 NOTE — PROCEDURES
Group Therapy Note    Date: 3/23/2024    Group Start Time: 1000  Group End Time: 1045  Group Topic: Psychotherapy     Jannette Waller MSW        Group Therapy Note    Attendees: 4/9       Patient's Goal:  Work on socialization/conversation using prompts    Patient was offered group therapy today but declined to participate despite encouragement from staff.  1:1 was offered.    Discipline Responsible: /Counselor      Signature:  TANO Hernández     EGD:  Minimal schatzki's ring :  Biopsied for disruption  Tertiary esophageal contractions ?esophageal spasm  Exam otherwise negative.  See Provation procedure note.   Gino Marie MD  [phone]  After 5 pm or on weekends  [phone]

## (undated) DEVICE — ESU GROUND PAD UNIVERSAL W/O CORD

## (undated) DEVICE — CATH ANGIO JUDKINS R4 6FRX100CM INFINITI 534621T

## (undated) DEVICE — INTRO GLIDESHEATH SLENDER 6FR 10X45CM 60-1060

## (undated) DEVICE — INTRO SHEATH 45CM  7FR PNCL DEST

## (undated) DEVICE — GUIDEWIRE VASC 325CM .014IN RTWR FLPY H80228240022

## (undated) DEVICE — CATH ANGIO LANGSTRON 6FRX110CM 145DEG 4H 5540

## (undated) DEVICE — CATH BALLOON NC EMERGE 3.00X12MM H7493926712300

## (undated) DEVICE — KIT LG BORE TOUHY ACCESS PLUS MAP152

## (undated) DEVICE — SU MONOCRYL 4-0 PS-2 18" UND Y496G

## (undated) DEVICE — WIRE GLIDE 0.035"X150CM VASC GR3506

## (undated) DEVICE — SPECIMEN CONTAINER 60MLW/10% FORMALIN 59601

## (undated) DEVICE — INTRODUCER SHEATH GREEN 6.5FRX11CM .038IN PSI-6F-11-038ACT

## (undated) DEVICE — GUIDEWIRE 180CM .035IN VASC STR FLXB

## (undated) DEVICE — INTRODUCER CATH VASC 5FRX10CM  MPIS-501-NT-U-SST

## (undated) DEVICE — NDL PERC ENTRY THINWALL 18GA 7.0" G00166

## (undated) DEVICE — ESU HOLDER LAP INST DISP PURPLE LONG 330MM H-PRO-330

## (undated) DEVICE — CATH ANGIO INFINITI 3DRC 6FRX100CM 534676T

## (undated) DEVICE — Device

## (undated) DEVICE — PEN MARKING SKIN

## (undated) DEVICE — CATH GUIDING MDL  6F MACH1 JR4

## (undated) DEVICE — NDL PERC ENTRY 21GA 4CM G00280

## (undated) DEVICE — CATH ANGIO JUDKINS JL4 6FRX100CM INFINITI 534620T

## (undated) DEVICE — DEVICE SUTURE GRASPER TROCAR CLOSURE 14GA PMITCSG

## (undated) DEVICE — TOTE ANGIO CORP PC15AT SAN32CC83O

## (undated) DEVICE — CATH BALLOON EMERGE 2.0X12MM H7493918912200

## (undated) DEVICE — SHEATH PINNACLE DESTINATION 6FRX45CM ST

## (undated) DEVICE — MANIFOLD KIT ANGIO AUTOMATED 014613

## (undated) DEVICE — CATH EP PACEL 5FRX110CM 1MM RIGHT HEART CURVE 401763

## (undated) DEVICE — INTRODUCER SHEATH FAST-CATH 8FRX12CM 406114

## (undated) DEVICE — INTRO SHEATH 6FRX10CM PINNACLE RSS602

## (undated) DEVICE — CATH FINECROSS MG 1.8-2.6FR X 130CM

## (undated) DEVICE — CATHETER BURR ADVANCER DEVICE ROTAPRO 1.25MM X 135CM

## (undated) DEVICE — CATH TURNPIKE LP 135CM

## (undated) DEVICE — PACK LAP CHOLE SLC15LCFSD

## (undated) DEVICE — KIT HAND CONTROL ANGIOTOUCH ACIST 65CM AT-P65

## (undated) DEVICE — INTRO SHEATH 7FRX10CM PINNACLE RSS702

## (undated) DEVICE — SU SILK 2-0 FSL 18" 677G

## (undated) DEVICE — CATH ANGIO INFINITI JR4 4FRX100CM 538421

## (undated) DEVICE — CATH GD 7FR MACH 1 GUIDING CAT

## (undated) DEVICE — CATH BALLOON EMERGE 2.5X12MM H7493918912250

## (undated) DEVICE — GUIDEWIRE VASC 0.014INX180CM RUNTHROUGH 25-1011

## (undated) DEVICE — SYR 50ML CATH TIP W/O NDL 309620

## (undated) DEVICE — DEFIB PRO-PADZ LVP LQD GEL ADULT 8900-2105-01

## (undated) DEVICE — CATH DIAGNOSTIC RADIAL 5FR TIG 4.0

## (undated) DEVICE — CATH ANGIO SUPERTORQUE AL1 6FRX100CM 532-645

## (undated) DEVICE — CATH GUIDELINER 6FR 5571

## (undated) DEVICE — GUIDEWIRE HI-TORQUE VERSACORE MOD J 1

## (undated) DEVICE — INFL DVC KIT W/10CC NITRO IN4530

## (undated) DEVICE — SOL WATER IRRIG 1000ML BOTTLE 2F7114

## (undated) DEVICE — CATH BALLOON EMERGE 2.75X12MMH7493918912270

## (undated) DEVICE — SUCTION IRR STRYKERFLOW II W/TIP 250-070-520

## (undated) DEVICE — SU VICRYL 2-0 SH 27" J317H

## (undated) DEVICE — COMPENSATOR PRESSURE MONITORING MANIFOLDS, ONE-VALVE, HANDLES OFF, RIGHT PORTS, ROTATING ADAPTER, MEDIUM PRESSURE

## (undated) DEVICE — WIRE GUIDE EXT 0.014-.018"X145CM 22260

## (undated) DEVICE — SLEEVE TR BAND RADIAL COMPRESSION DEVICE 24CM TRB24-REG

## (undated) DEVICE — ENDO TROCAR FIRST ENTRY KII FIOS Z-THRD 05X100MM CTF03

## (undated) DEVICE — WIRE GUIDE 0.035"X260CM SAFE-T-J EXCHANGE G00517

## (undated) DEVICE — BLADE CLIPPER 4406

## (undated) DEVICE — SU VICRYL 0 UR-6 27" J603H

## (undated) DEVICE — CATH BALLOON NC EMERGE 3.00X20MM H7493926720300

## (undated) DEVICE — LINEN TOWEL PACK X5 5464

## (undated) DEVICE — GLOVE PROTEXIS BLUE W/NEU-THERA 7.5  2D73EB75

## (undated) DEVICE — INTRO TERUMO 6FRX25CM W/MARKER RSB603

## (undated) DEVICE — INTRO SHEATH 4FRX10CM PINNACLE RSS402

## (undated) DEVICE — CATH BALLOON NC EMERGE 2.50X15MM H7493926715250

## (undated) DEVICE — CATH BALLOON EMERGE 2.25X15MMH7493918915220

## (undated) DEVICE — GLOVE PROTEXIS MICRO 7.5  2D73PM75

## (undated) DEVICE — SUCTION CANISTER MEDIVAC LINER 3000ML W/LID 65651-530

## (undated) DEVICE — SOL NACL 0.9% IRRIG 1000ML BOTTLE 2F7124

## (undated) DEVICE — VALVE HEMOSTASIS .096" COPILOT MECH 1003331

## (undated) DEVICE — GUIDEWIRE VERRATA PLUS PRESSURE 185CM JTIP 10185JP

## (undated) DEVICE — DRSG STERI STRIP 1/2X4" R1547

## (undated) DEVICE — CATH BALLOON NC EMERGE 2.50X20MM H7493926720250

## (undated) DEVICE — PREP CHLORAPREP 26ML TINTED HI-LITE ORANGE 930815

## (undated) DEVICE — WIRE GUIDE 0.035"X150CM EMERALD STR 502542

## (undated) DEVICE — TUBE GASTROSTOMY MIC LOW VOL ENFIT 16FR SIL 8100-16LV

## (undated) RX ORDER — LIDOCAINE HYDROCHLORIDE 10 MG/ML
INJECTION, SOLUTION EPIDURAL; INFILTRATION; INTRACAUDAL; PERINEURAL
Status: DISPENSED
Start: 2020-03-23

## (undated) RX ORDER — BUPIVACAINE HYDROCHLORIDE AND EPINEPHRINE 2.5; 5 MG/ML; UG/ML
INJECTION, SOLUTION EPIDURAL; INFILTRATION; INTRACAUDAL; PERINEURAL
Status: DISPENSED
Start: 2022-01-01

## (undated) RX ORDER — HEPARIN SODIUM 1000 [USP'U]/ML
INJECTION, SOLUTION INTRAVENOUS; SUBCUTANEOUS
Status: DISPENSED
Start: 2017-09-05

## (undated) RX ORDER — ASPIRIN 325 MG
TABLET ORAL
Status: DISPENSED
Start: 2020-05-18

## (undated) RX ORDER — VERAPAMIL HYDROCHLORIDE 2.5 MG/ML
INJECTION, SOLUTION INTRAVENOUS
Status: DISPENSED
Start: 2017-09-05

## (undated) RX ORDER — NITROGLYCERIN 5 MG/ML
VIAL (ML) INTRAVENOUS
Status: DISPENSED
Start: 2020-03-23

## (undated) RX ORDER — ASPIRIN 81 MG/1
TABLET ORAL
Status: DISPENSED
Start: 2017-09-05

## (undated) RX ORDER — HEPARIN SODIUM 200 [USP'U]/100ML
INJECTION, SOLUTION INTRAVENOUS
Status: DISPENSED
Start: 2020-05-28

## (undated) RX ORDER — FENTANYL CITRATE 50 UG/ML
INJECTION, SOLUTION INTRAMUSCULAR; INTRAVENOUS
Status: DISPENSED
Start: 2018-09-08

## (undated) RX ORDER — LIDOCAINE HYDROCHLORIDE 10 MG/ML
INJECTION, SOLUTION EPIDURAL; INFILTRATION; INTRACAUDAL; PERINEURAL
Status: DISPENSED
Start: 2020-05-18

## (undated) RX ORDER — LIDOCAINE HYDROCHLORIDE 10 MG/ML
INJECTION, SOLUTION EPIDURAL; INFILTRATION; INTRACAUDAL; PERINEURAL
Status: DISPENSED
Start: 2020-05-28

## (undated) RX ORDER — ONDANSETRON 2 MG/ML
INJECTION INTRAMUSCULAR; INTRAVENOUS
Status: DISPENSED
Start: 2022-01-01

## (undated) RX ORDER — FENTANYL CITRATE 50 UG/ML
INJECTION, SOLUTION INTRAMUSCULAR; INTRAVENOUS
Status: DISPENSED
Start: 2022-01-01

## (undated) RX ORDER — HEPARIN SODIUM 1000 [USP'U]/ML
INJECTION, SOLUTION INTRAVENOUS; SUBCUTANEOUS
Status: DISPENSED
Start: 2020-05-28

## (undated) RX ORDER — LIDOCAINE HYDROCHLORIDE 10 MG/ML
INJECTION, SOLUTION EPIDURAL; INFILTRATION; INTRACAUDAL; PERINEURAL
Status: DISPENSED
Start: 2017-09-05

## (undated) RX ORDER — PHENYLEPHRINE HCL IN 0.9% NACL 1 MG/10 ML
SYRINGE (ML) INTRAVENOUS
Status: DISPENSED
Start: 2019-07-31

## (undated) RX ORDER — DEXTROSE MONOHYDRATE 25 G/50ML
INJECTION, SOLUTION INTRAVENOUS
Status: DISPENSED
Start: 2019-07-31

## (undated) RX ORDER — FENTANYL CITRATE 50 UG/ML
INJECTION, SOLUTION INTRAMUSCULAR; INTRAVENOUS
Status: DISPENSED
Start: 2017-06-08

## (undated) RX ORDER — REGADENOSON 0.08 MG/ML
INJECTION, SOLUTION INTRAVENOUS
Status: DISPENSED
Start: 2017-06-28

## (undated) RX ORDER — FENTANYL CITRATE 50 UG/ML
INJECTION, SOLUTION INTRAMUSCULAR; INTRAVENOUS
Status: DISPENSED
Start: 2017-09-05

## (undated) RX ORDER — POTASSIUM CHLORIDE 1500 MG/1
TABLET, EXTENDED RELEASE ORAL
Status: DISPENSED
Start: 2017-09-05

## (undated) RX ORDER — FENTANYL CITRATE 50 UG/ML
INJECTION, SOLUTION INTRAMUSCULAR; INTRAVENOUS
Status: DISPENSED
Start: 2020-05-18

## (undated) RX ORDER — NITROGLYCERIN 5 MG/ML
VIAL (ML) INTRAVENOUS
Status: DISPENSED
Start: 2017-09-05

## (undated) RX ORDER — DEXAMETHASONE SODIUM PHOSPHATE 4 MG/ML
INJECTION, SOLUTION INTRA-ARTICULAR; INTRALESIONAL; INTRAMUSCULAR; INTRAVENOUS; SOFT TISSUE
Status: DISPENSED
Start: 2022-01-01

## (undated) RX ORDER — HEPARIN SODIUM 1000 [USP'U]/ML
INJECTION, SOLUTION INTRAVENOUS; SUBCUTANEOUS
Status: DISPENSED
Start: 2020-05-18

## (undated) RX ORDER — HEPARIN SODIUM 200 [USP'U]/100ML
INJECTION, SOLUTION INTRAVENOUS
Status: DISPENSED
Start: 2020-03-23

## (undated) RX ORDER — FENTANYL CITRATE 50 UG/ML
INJECTION, SOLUTION INTRAMUSCULAR; INTRAVENOUS
Status: DISPENSED
Start: 2020-03-23

## (undated) RX ORDER — NALOXONE HYDROCHLORIDE 0.4 MG/ML
INJECTION, SOLUTION INTRAMUSCULAR; INTRAVENOUS; SUBCUTANEOUS
Status: DISPENSED
Start: 2019-07-31

## (undated) RX ORDER — VERAPAMIL HYDROCHLORIDE 2.5 MG/ML
INJECTION, SOLUTION INTRAVENOUS
Status: DISPENSED
Start: 2020-03-23

## (undated) RX ORDER — LIDOCAINE HYDROCHLORIDE 10 MG/ML
INJECTION, SOLUTION INFILTRATION; PERINEURAL
Status: DISPENSED
Start: 2022-01-01

## (undated) RX ORDER — FLUMAZENIL 0.1 MG/ML
INJECTION, SOLUTION INTRAVENOUS
Status: DISPENSED
Start: 2019-07-31

## (undated) RX ORDER — ESMOLOL HYDROCHLORIDE 10 MG/ML
INJECTION INTRAVENOUS
Status: DISPENSED
Start: 2022-01-01

## (undated) RX ORDER — HEPARIN SODIUM 1000 [USP'U]/ML
INJECTION, SOLUTION INTRAVENOUS; SUBCUTANEOUS
Status: DISPENSED
Start: 2020-03-23

## (undated) RX ORDER — LIDOCAINE HYDROCHLORIDE 20 MG/ML
INJECTION, SOLUTION EPIDURAL; INFILTRATION; INTRACAUDAL; PERINEURAL
Status: DISPENSED
Start: 2022-01-01

## (undated) RX ORDER — CLINDAMYCIN PHOSPHATE 900 MG/50ML
INJECTION, SOLUTION INTRAVENOUS
Status: DISPENSED
Start: 2018-10-22

## (undated) RX ORDER — GLYCOPYRROLATE 0.2 MG/ML
INJECTION, SOLUTION INTRAMUSCULAR; INTRAVENOUS
Status: DISPENSED
Start: 2019-07-31

## (undated) RX ORDER — DOBUTAMINE HYDROCHLORIDE 200 MG/100ML
INJECTION INTRAVENOUS
Status: DISPENSED
Start: 2019-07-01

## (undated) RX ORDER — FENTANYL CITRATE 50 UG/ML
INJECTION, SOLUTION INTRAMUSCULAR; INTRAVENOUS
Status: DISPENSED
Start: 2019-07-31

## (undated) RX ORDER — CLINDAMYCIN PHOSPHATE 900 MG/50ML
INJECTION, SOLUTION INTRAVENOUS
Status: DISPENSED
Start: 2022-01-01

## (undated) RX ORDER — POTASSIUM CHLORIDE 1500 MG/1
TABLET, EXTENDED RELEASE ORAL
Status: DISPENSED
Start: 2020-05-18

## (undated) RX ORDER — FENTANYL CITRATE 50 UG/ML
INJECTION, SOLUTION INTRAMUSCULAR; INTRAVENOUS
Status: DISPENSED
Start: 2020-05-28

## (undated) RX ORDER — ATROPINE SULFATE 0.1 MG/ML
INJECTION INTRAVENOUS
Status: DISPENSED
Start: 2020-05-18

## (undated) RX ORDER — HEPARIN SODIUM 200 [USP'U]/100ML
INJECTION, SOLUTION INTRAVENOUS
Status: DISPENSED
Start: 2020-05-18

## (undated) RX ORDER — CLOPIDOGREL 300 MG/1
TABLET, FILM COATED ORAL
Status: DISPENSED
Start: 2020-05-18